# Patient Record
Sex: FEMALE | Race: WHITE | NOT HISPANIC OR LATINO | Employment: OTHER | URBAN - METROPOLITAN AREA
[De-identification: names, ages, dates, MRNs, and addresses within clinical notes are randomized per-mention and may not be internally consistent; named-entity substitution may affect disease eponyms.]

---

## 2018-09-17 LAB
CREAT ?TM UR-SCNC: 61.2 UMOL/L
HBA1C MFR BLD HPLC: 6.4 %
MICROALBUMIN UR-MCNC: 154.2 MG/L (ref 0–20)
MICROALBUMIN/CREAT UR: 252 MG/G{CREAT}

## 2018-09-19 ENCOUNTER — OFFICE VISIT (OUTPATIENT)
Dept: NEPHROLOGY | Facility: CLINIC | Age: 68
End: 2018-09-19
Payer: MEDICARE

## 2018-09-19 VITALS — BODY MASS INDEX: 28.67 KG/M2 | WEIGHT: 161.8 LBS | HEIGHT: 63 IN

## 2018-09-19 DIAGNOSIS — I12.9 HYPERTENSIVE CHRONIC KIDNEY DISEASE WITH STAGE 1 THROUGH STAGE 4 CHRONIC KIDNEY DISEASE, OR UNSPECIFIED CHRONIC KIDNEY DISEASE: ICD-10-CM

## 2018-09-19 DIAGNOSIS — N18.30 CHRONIC KIDNEY DISEASE, STAGE III (MODERATE) (HCC): Primary | ICD-10-CM

## 2018-09-19 DIAGNOSIS — R80.1 PERSISTENT PROTEINURIA: ICD-10-CM

## 2018-09-19 LAB
SL AMB  POCT GLUCOSE, UA: ABNORMAL
SL AMB LEUKOCYTE ESTERASE,UA: ABNORMAL
SL AMB POCT BILIRUBIN,UA: ABNORMAL
SL AMB POCT BLOOD,UA: ABNORMAL
SL AMB POCT CLARITY,UA: CLEAR
SL AMB POCT COLOR,UA: YELLOW
SL AMB POCT KETONES,UA: ABNORMAL
SL AMB POCT NITRITE,UA: ABNORMAL
SL AMB POCT PH,UA: 8
SL AMB POCT SPECIFIC GRAVITY,UA: 1.01
SL AMB POCT URINE PROTEIN: ABNORMAL
SL AMB POCT UROBILINOGEN: ABNORMAL

## 2018-09-19 PROCEDURE — 81002 URINALYSIS NONAUTO W/O SCOPE: CPT | Performed by: INTERNAL MEDICINE

## 2018-09-19 PROCEDURE — 99205 OFFICE O/P NEW HI 60 MIN: CPT | Performed by: INTERNAL MEDICINE

## 2018-09-19 RX ORDER — LEVOTHYROXINE SODIUM 175 UG/1
150 TABLET ORAL DAILY
COMMUNITY
End: 2021-04-01

## 2018-09-19 RX ORDER — DULOXETIN HYDROCHLORIDE 60 MG/1
90 CAPSULE, DELAYED RELEASE ORAL DAILY
COMMUNITY
End: 2022-04-05 | Stop reason: ALTCHOICE

## 2018-09-19 RX ORDER — GLIMEPIRIDE 2 MG/1
4 TABLET ORAL 2 TIMES DAILY
COMMUNITY
End: 2020-05-05 | Stop reason: HOSPADM

## 2018-09-19 RX ORDER — CYCLOBENZAPRINE HCL 10 MG
10 TABLET ORAL DAILY
COMMUNITY
End: 2020-05-11

## 2018-09-19 RX ORDER — TRIAMTERENE AND HYDROCHLOROTHIAZIDE 37.5; 25 MG/1; MG/1
1 CAPSULE ORAL EVERY MORNING
COMMUNITY
End: 2019-04-06 | Stop reason: ALTCHOICE

## 2018-09-19 RX ORDER — PRAMIPEXOLE DIHYDROCHLORIDE 0.25 MG/1
0.25 TABLET ORAL DAILY
COMMUNITY
End: 2020-11-24 | Stop reason: SDUPTHER

## 2018-09-19 RX ORDER — METOPROLOL TARTRATE 50 MG/1
50 TABLET, FILM COATED ORAL EVERY 12 HOURS SCHEDULED
COMMUNITY
End: 2020-10-26 | Stop reason: SDUPTHER

## 2018-09-19 RX ORDER — MULTIVITAMIN
1 CAPSULE ORAL DAILY
COMMUNITY
End: 2020-05-05 | Stop reason: HOSPADM

## 2018-09-19 RX ORDER — ROSUVASTATIN CALCIUM 5 MG/1
5 TABLET, COATED ORAL DAILY
COMMUNITY
End: 2019-11-13 | Stop reason: ALTCHOICE

## 2018-09-19 RX ORDER — LOSARTAN POTASSIUM 100 MG/1
50 TABLET ORAL DAILY
COMMUNITY
End: 2018-11-27 | Stop reason: CLARIF

## 2018-09-19 RX ORDER — FENOFIBRATE 145 MG/1
145 TABLET, COATED ORAL DAILY
COMMUNITY
End: 2019-11-13 | Stop reason: ALTCHOICE

## 2018-09-19 RX ORDER — AMLODIPINE BESYLATE 5 MG/1
5 TABLET ORAL DAILY
COMMUNITY
End: 2018-10-08 | Stop reason: SDUPTHER

## 2018-09-19 RX ORDER — FOLIC ACID 0.8 MG
1 TABLET ORAL 2 TIMES DAILY
COMMUNITY
End: 2021-04-12

## 2018-09-19 NOTE — PATIENT INSTRUCTIONS
1   No medication change today  2   Please go for lab work nonfasting at your convenience now  3  Please go for a kidney artery ultrasound now which we will help to arrange  4  Please purchase an Omron blood pressure machine:  Omron 10 series-785n through [employer]  Then please take 1 week a blood pressure readings morning and evening  Take the morning readings before taking any medications  Take the evening readings closer bedtime  Please then bring in your readings along with your blood pressure machine to see 1 of my advanced practitioner's in about 2-3 weeks  5   Follow-up in 4 months:  · Please bring in 1 week a blood pressure readings morning and evening, sitting and standing is outlined above  The standing blood pressures should be with the arm supported at heart level  · Please go for fasting lab work prior to your appointment  6  General instructions:  -avoid salt  -avoid medications such as Motrin, Naprosyn, ibuprofen, Aleve or Advil or Celebrex as they can affect your kidney function; you can use Tylenol as needed for pain or fevers if you have no liver problems  -avoid medications such as Sudafed or other medications with decongestants as they can raise your blood pressure  -try to exercise at least 30 minutes at least 3 days a week with an ultimate goal of 5 days a week  -try to lose 5-10 lb by your next visit

## 2018-09-19 NOTE — LETTER
September 19, 2018     Carley Buerger, [address] [place] [address] Radhames Acuña    Patient: Jere Gonsalves   YOB: 1950   Date of Visit: 9/19/2018       Dear Dr Ruth Gloria:    Thank you for referring Jere Gonsalves to me for evaluation  Below are my notes for this consultation  If you have questions, please do not hesitate to call me  I look forward to following your patient along with you  Sincerely,        Evelia Shelton MD        CC: DO Nohemi Cain MD Loyola Sa, MD Pearle Ring, MD  9/19/2018  3:32 PM  Sign at close encounter  Consultation - Nephrology 9/19/2018        History of Present Illness   Reason for Consult / Principal Problem:   CKD stage 3    HPI: Jere Gonsalves is a 70y o  year old female with a history of of hypertension/diabetes mellitus/BARBARA who we are asked to evaluate regarding CKD stage 3:  Creatinines:  -01/09/2018:   1 48  -07/03/2018:  2 0  -07/27/2018:  1 66  -09/17/2018:  1 89  Past urologic history:  -history of a kidney stone over 30 years ago initially attempted lithotripsy 15 years ago had to redo it and eventually was removed  None since then  -difficulty voiding, difficulty with emptying her bladder and is followed by Urology Dr Angelito Arthur with [address]  For now he is observing her in asking her to empty her bladder every 2-3 hours  No history of gross hematuria or foamy urine   -no NSAID use at this time; was on Celebrex for about 10 years for the arthritis stopped about 3 years ago  -no significant urinary tract infection history  History of hypertension for about 15 years she believes well controlled but she does not followed at home  History of diabetes mellitus for about 17 years treated medically  No overt retinopathy or neuropathy    In regards to swelling, when traveling a lot on her feet she has developed swelling but that essentially resolved until about 1 month ago when she noted some swelling of her left calf without any tenderness and it resolved spontaneously a few days later  And currently no swelling  She did have a venous duplex there was no clot at the time  Chronic joint pain secondary to osteoarthritis in particular of her hands feet and ankles this has been longstanding but slightly worse recently  No unusual skin rash  Blood pressure medications:  -triamterene/HCTZ 37 5-25 milligrams daily  -amlodipine 10 milligrams daily  -losartan 100 milligrams daily  -metoprolol tartrate 50 milligrams twice a day    General:  No fevers chills or recent illnesses or coughing  Appetite is good  Energy is poor but has been so for several years since extensive hiatal hernia surgery requiring ventilation  Cardiovascular:  No chest pain or shortness of Breath:  Please see above regarding swelling  Respiratory:  No wheezing or shortness of Breath  Gastrointestinal:  No nausea vomiting or diarrhea  She is constipated  She does take MiraLax  A remote history of hematochezia secondary to hemorrhoids/constipation  No current blood in the stools or dark black tarry stools    Genitourinary:  See HPI  Neurology:   Occasional headache, some slight dizziness/lightheadedness upon standing and if she closes her eyes and a hot shower for example; no overt paresthesias or numbness  Rest of review of systems as completely reviewed with the patient are negative  Past medical history:  · Anemia  · Depression  · Diabetes mellitus  · GERD  · Hiatal hernia  · Dyslipidemia  · Hypertension  · Hypothyroidism  · Osteopenia  · BARBARA:  On CPAP  · Fibromyalgia/osteoarthritis  · Nephrolithiasis  · Squamous cell cancer  of the face; 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  · Urinary stress incontinence    Past surgical history:  · Abdominoplasty  · Breast surgery reduction procedure bilaterally  · D&C  · Cholecystectomy  · Laparoscopic esophagogastric fundoplasty fundoplication for a hiatal hernia  · Knee arthroscopy  · Partial parathyroidectomy  · Septoplasty  · Tonsils and adenoidectomy  · Tubal ligation    Social History   History   Alcohol use Not on file   ·  rare alcohol use  History   Drug use: Unknown     History   Smoking Status    Not on file   Smokeless Tobacco    Not on file   ·  Smoked about 8 years, stopped in 1976    Family history:    Meds/Allergies   all current active meds have been reviewed, current meds:   Current Outpatient Prescriptions:     ALENDRONATE SODIUM PO, Take 1 tablet by mouth once a week, Disp: , Rfl:     amLODIPine (NORVASC) 10 mg tablet, Take 10 mg by mouth daily, Disp: , Rfl:     b complex vitamins tablet, Take 1 tablet by mouth 2 (two) times a day, Disp: , Rfl:     Calcium Carbonate (CALCIUM 600 PO), Take 1 capsule by mouth 2 (two) times a day, Disp: , Rfl:     cyclobenzaprine (FLEXERIL) 10 mg tablet, Take 10 mg by mouth daily, Disp: , Rfl:     diclofenac sodium (VOLTAREN) 1 %, Apply 2 g topically as needed, Disp: , Rfl:     DULoxetine (CYMBALTA) 60 mg delayed release capsule, Take 60 mg by mouth daily, Disp: , Rfl:     fenofibrate (TRICOR) 145 mg tablet, Take 145 mg by mouth daily, Disp: , Rfl:     glimepiride (AMARYL) 2 mg tablet, Take 2 mg by mouth 2 (two) times a day, Disp: , Rfl:     insulin detemir (LEVEMIR) 100 units/mL subcutaneous injection, Inject 25 Units under the skin daily at bedtime, Disp: , Rfl:     levothyroxine 175 mcg tablet, Take 175 mcg by mouth daily, Disp: , Rfl:     Linagliptin (TRADJENTA) 5 MG TABS, Take 5 mg by mouth daily, Disp: , Rfl:     losartan (COZAAR) 100 MG tablet, Take 100 mg by mouth daily, Disp: , Rfl:     Magnesium 500 MG CAPS, Take 1 capsule by mouth 3 (three) times a day, Disp: , Rfl:     metoprolol tartrate (LOPRESSOR) 50 mg tablet, Take 25 mg by mouth 2 (two) times a day, Disp: , Rfl:     Multiple Vitamin (MULTIVITAMIN) capsule, Take 1 capsule by mouth daily, Disp: , Rfl:     OMEPRAZOLE PO, Take 1 tablet by mouth daily, Disp: , Rfl:     pramipexole (MIRAPEX) 0 25 mg tablet, Take 0 25 mg by mouth daily, Disp: , Rfl:     rosuvastatin (CRESTOR) 5 mg tablet, Take 5 mg by mouth daily, Disp: , Rfl:     triamterene-hydrochlorothiazide (DYAZIDE) 37 5-25 mg per capsule, Take 1 capsule by mouth every morning, Disp: , Rfl:     Allergies   Allergen Reactions    Ciprofloxacin Hives       Objective   Vital signs:  BP sitting on left:  132/62 with a heart rate of 68 and regular  Same on the right  BP standing on left:  130/66 with a heart rate of 60 and regular    Body mass index is 28 66 kg/m²  General:  Well-developed well-nourished/obese in no acute distress  Skin:  No acute rash  Eyes:  No scleral icterus and noninjected  ENT:  Normocephalic/atraumatic, mucous membranes moist  Neck:  Supple, no jugular venous distention, 1+ carotid upstroke without carotid bruit, no thyromegaly  Back:  No CVA tenderness and no obvious spinal abnormality  Chest:  Clear to auscultation percussion, good respiratory effort  CVS:  Regular rate and rhythm without a murmur rub or gallops appreciable  Abdomen:  Obese, normal bowel sounds, soft and nontender without hepatosplenomegaly or bruits, no masses appreciable  Extremities:  No clubbing, no cyanosis, no edema, 1+ dorsalis pedis pulses without femoral bruits, no calf tenderness  Neuro:  No gross focality  Psych:  Alert oriented and appropriate    Current Weight:   Weight (last 2 days)     Date/Time   Weight    09/19/18 1446  73 4 (161 8)                Lab Results:  I have personally reviewed pertinent labs    Results for orders placed or performed in visit on 09/19/18   POCT urine dip   Result Value Ref Range    LEUKOCYTE ESTERASE,UA neg     NITRITE,UA neg     SL AMB POCT UROBILINOGEN Neg     SL AMB POCT URINE PROTEIN trace      PH,UA 8 0      BLOOD,UA Neg     SPECIFIC GRAVITY,UA 1 010     KETONES,UA neg      BILIRUBIN,UA neg     GLUCOSE, UA neg      COLOR,UA yellow      CLARITY,UA clear     UA microscopic by my exam:  As of 09/19/2018:  Few WBCs per high-power field, no RBCs and no casts    Recent labs as of 09/17/2018:  Sodium 138  Potassium 4 8  Creatinine 1 89   urine microalbumin creatinine ratio:  252 milligrams/gram  Normal liver function studies  Calcium 10 1  Total protein 6 9  Albumin 4 6  Total cholesterol 131/triglycerides 366/HDL 31/LDL 27  Vitamin-D 35 4  Hemoglobin A1c 6 4  Hemoglobin of 12 0    Renal ultrasound by report:  06/19/2018:  -Left upper kidney shows nonobstructing calculus approximately 8 millimeters, otherwise unremarkable ultrasound  -right kidney 11 5  -left kidney 12 2    KUB as of 05/18/2018:  -no abnormal mineralization to suggest renal calculus    CT scan as of 06/28/2018 of the abdomen and pelvis:  -nonobstructing 9 mm upper pole left renal calculus  -16 mm indeterminate hypoechoic structure in the anterior left kidney for which further correlation with renal ultrasound is recommended  Please see above   -small to moderate-sized hiatal hernia  -evidence of gastric surgery and cholecystectomy  -ventral abdominal wall hernias containing fat  -constipation  -sewn parenchymal opacity in the left lower lobe of the lung which may be a manifestation of subsegmental atelectasis or pneumonitis otherwise lung bases are clear        ASSESSMENT AND PLAN:  1   CKD stage 3:  Baseline creatinine appears to be from 1 5-2  Most recent creatinine 1 89 milligrams/deciliter  Differential diagnosis would include the following:  · Diabetic nephropathy  · Hypertensive nephrosclerosis  · Arteriolar nephrosclerosis  · Chronic NSAID use in the past  · No evidence of obstructive uropathy based on ultrasound  · ? Renal artery disease  · Less likely primary glomerular disease  Recommendations:   Workup:  · CPK to make sure no evidence of rhabdomyolysis unlikely  · Urine protein creatinine ratio  · Further workup with serologies if greater than 1 gram of proteinuria to rule out other systemic illnesses that may be contributing to the renal disease  · Complete mineral bone disorder evaluation with PTH intact level and phosphorus level  Treatment:  · Treat hypertension:  Please see below  · Treat diabetes mellitus per your discretion  · Treat dyslipidemia with a goal LDL less than 100  · Treat any underlying mineral bone disorders  · Avoidance of salt, pursue weight loss and isotonic exercise  Patient counseled as such  · Avoid any nephrotoxic medications such as NSAIDs, patient counseled as such  · Will follow labs on a regular basis  2  Hypertension:  Most likely related to CKD/diabetic nephropathy/thyroid disease/BARBARA  However given the fact that it is resistant on 4 medications fairly high doses I would do a secondary workup including the following:  Workup:  · Aldosterone/renin ratio  · Urine protein creatinine ratio  · Renal artery duplex  · Plasma free metanephrines  Treatment:  · Continue current medical regimen  · Purchase a Omron blood pressure machine and monitor blood pressures at home  · Push low-salt diet, isotonic exercise and weight losing diet  · GOAL BLOOD PRESSURE LESS THAN 135/85 FROM POSSIBLY LOWER SIGNIFICANT PROTEINURIA      Patient Instructions   1  No medication change today  2   Please go for lab work nonfasting at your convenience now  3  Please go for a kidney artery ultrasound now which we will help to arrange  4  Please purchase an Omron blood pressure machine:  Omron 10 series-785n through [employer]  Then please take 1 week a blood pressure readings morning and evening  Take the morning readings before taking any medications  Take the evening readings closer bedtime  Please then bring in your readings along with your blood pressure machine to see 1 of my advanced practitioner's in about 2-3 weeks  5   Follow-up in 4 months:  · Please bring in 1 week a blood pressure readings morning and evening, sitting and standing is outlined above    The standing blood pressures should be with the arm supported at heart level  · Please go for fasting lab work prior to your appointment  6  General instructions:  -avoid salt  -avoid medications such as Motrin, Naprosyn, ibuprofen, Aleve or Advil or Celebrex as they can affect your kidney function; you can use Tylenol as needed for pain or fevers if you have no liver problems  -avoid medications such as Sudafed or other medications with decongestants as they can raise your blood pressure  -try to exercise at least 30 minutes at least 3 days a week with an ultimate goal of 5 days a week  -try to lose 5-10 lb by your next visit        Portions of the record may have been created with voice recognition software   Occasional wrong word or "sound a like" substitutions may have occurred due to the inherent limitations of voice recognition software   Read the chart carefully and recognize, using context, where substitutions have occurred      Juice Best MD

## 2018-09-19 NOTE — PROGRESS NOTES
Consultation - Nephrology 9/19/2018        History of Present Illness   Reason for Consult / Principal Problem:   CKD stage 3    HPI: Willis Farah is a 70y o  year old female with a history of of hypertension/diabetes mellitus/BARBARA who we are asked to evaluate regarding CKD stage 3:  Creatinines:  -01/09/2018:   1 48  -07/03/2018:  2 0  -07/27/2018:  1 66  -09/17/2018:  1 89  Past urologic history:  -history of a kidney stone over 30 years ago initially attempted lithotripsy 15 years ago had to redo it and eventually was removed  None since then  -difficulty voiding, difficulty with emptying her bladder and is followed by Urology Dr Sathish Werner with [address]  For now he is observing her in asking her to empty her bladder every 2-3 hours  No history of gross hematuria or foamy urine   -no NSAID use at this time; was on Celebrex for about 10 years for the arthritis stopped about 3 years ago  -no significant urinary tract infection history  History of hypertension for about 15 years she believes well controlled but she does not followed at home  History of diabetes mellitus for about 17 years treated medically  No overt retinopathy or neuropathy  In regards to swelling, when traveling a lot on her feet she has developed swelling but that essentially resolved until about 1 month ago when she noted some swelling of her left calf without any tenderness and it resolved spontaneously a few days later  And currently no swelling  She did have a venous duplex there was no clot at the time  Chronic joint pain secondary to osteoarthritis in particular of her hands feet and ankles this has been longstanding but slightly worse recently  No unusual skin rash  Blood pressure medications:  -triamterene/HCTZ 37 5-25 milligrams daily  -amlodipine 10 milligrams daily  -losartan 100 milligrams daily  -metoprolol tartrate 50 milligrams twice a day    General:  No fevers chills or recent illnesses or coughing    Appetite is good   Energy is poor but has been so for several years since extensive hiatal hernia surgery requiring ventilation  Cardiovascular:  No chest pain or shortness of Breath:  Please see above regarding swelling  Respiratory:  No wheezing or shortness of Breath  Gastrointestinal:  No nausea vomiting or diarrhea  She is constipated  She does take MiraLax  A remote history of hematochezia secondary to hemorrhoids/constipation  No current blood in the stools or dark black tarry stools    Genitourinary:  See HPI  Neurology:   Occasional headache, some slight dizziness/lightheadedness upon standing and if she closes her eyes and a hot shower for example; no overt paresthesias or numbness  Rest of review of systems as completely reviewed with the patient are negative  Past medical history:  · Anemia  · Depression  · Diabetes mellitus  · GERD  · Hiatal hernia  · Dyslipidemia  · Hypertension  · Hypothyroidism  · Osteopenia  · BARBARA:  On CPAP  · Fibromyalgia/osteoarthritis  · Nephrolithiasis  · Squamous cell cancer  of the face; 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  · Urinary stress incontinence    Past surgical history:  · Abdominoplasty  · Breast surgery reduction procedure bilaterally  · D&C  · Cholecystectomy  · Laparoscopic esophagogastric fundoplasty fundoplication for a hiatal hernia  · Knee arthroscopy  · Partial parathyroidectomy  · Septoplasty  · Tonsils and adenoidectomy  · Tubal ligation    Social History   History   Alcohol use Not on file   ·  rare alcohol use  History   Drug use: Unknown     History   Smoking Status    Not on file   Smokeless Tobacco    Not on file   ·  Smoked about 8 years, stopped in 1976    Family history:    Meds/Allergies   all current active meds have been reviewed, current meds:   Current Outpatient Prescriptions:     ALENDRONATE SODIUM PO, Take 1 tablet by mouth once a week, Disp: , Rfl:     amLODIPine (NORVASC) 10 mg tablet, Take 10 mg by mouth daily, Disp: , Rfl:     b complex vitamins tablet, Take 1 tablet by mouth 2 (two) times a day, Disp: , Rfl:     Calcium Carbonate (CALCIUM 600 PO), Take 1 capsule by mouth 2 (two) times a day, Disp: , Rfl:     cyclobenzaprine (FLEXERIL) 10 mg tablet, Take 10 mg by mouth daily, Disp: , Rfl:     diclofenac sodium (VOLTAREN) 1 %, Apply 2 g topically as needed, Disp: , Rfl:     DULoxetine (CYMBALTA) 60 mg delayed release capsule, Take 60 mg by mouth daily, Disp: , Rfl:     fenofibrate (TRICOR) 145 mg tablet, Take 145 mg by mouth daily, Disp: , Rfl:     glimepiride (AMARYL) 2 mg tablet, Take 2 mg by mouth 2 (two) times a day, Disp: , Rfl:     insulin detemir (LEVEMIR) 100 units/mL subcutaneous injection, Inject 25 Units under the skin daily at bedtime, Disp: , Rfl:     levothyroxine 175 mcg tablet, Take 175 mcg by mouth daily, Disp: , Rfl:     Linagliptin (TRADJENTA) 5 MG TABS, Take 5 mg by mouth daily, Disp: , Rfl:     losartan (COZAAR) 100 MG tablet, Take 100 mg by mouth daily, Disp: , Rfl:     Magnesium 500 MG CAPS, Take 1 capsule by mouth 3 (three) times a day, Disp: , Rfl:     metoprolol tartrate (LOPRESSOR) 50 mg tablet, Take 25 mg by mouth 2 (two) times a day, Disp: , Rfl:     Multiple Vitamin (MULTIVITAMIN) capsule, Take 1 capsule by mouth daily, Disp: , Rfl:     OMEPRAZOLE PO, Take 1 tablet by mouth daily, Disp: , Rfl:     pramipexole (MIRAPEX) 0 25 mg tablet, Take 0 25 mg by mouth daily, Disp: , Rfl:     rosuvastatin (CRESTOR) 5 mg tablet, Take 5 mg by mouth daily, Disp: , Rfl:     triamterene-hydrochlorothiazide (DYAZIDE) 37 5-25 mg per capsule, Take 1 capsule by mouth every morning, Disp: , Rfl:     Allergies   Allergen Reactions    Ciprofloxacin Hives       Objective   Vital signs:  BP sitting on left:  132/62 with a heart rate of 68 and regular  Same on the right  BP standing on left:  130/66 with a heart rate of 60 and regular    Body mass index is 28 66 kg/m²      General:  Well-developed well-nourished/obese in no acute distress  Skin:  No acute rash  Eyes:  No scleral icterus and noninjected  ENT:  Normocephalic/atraumatic, mucous membranes moist  Neck:  Supple, no jugular venous distention, 1+ carotid upstroke without carotid bruit, no thyromegaly  Back:  No CVA tenderness and no obvious spinal abnormality  Chest:  Clear to auscultation percussion, good respiratory effort  CVS:  Regular rate and rhythm without a murmur rub or gallops appreciable  Abdomen:  Obese, normal bowel sounds, soft and nontender without hepatosplenomegaly or bruits, no masses appreciable  Extremities:  No clubbing, no cyanosis, no edema, 1+ dorsalis pedis pulses without femoral bruits, no calf tenderness  Neuro:  No gross focality  Psych:  Alert oriented and appropriate    Current Weight:   Weight (last 2 days)     Date/Time   Weight    09/19/18 1446  73 4 (161 8)                Lab Results:  I have personally reviewed pertinent labs    Results for orders placed or performed in visit on 09/19/18   POCT urine dip   Result Value Ref Range    LEUKOCYTE ESTERASE,UA neg     NITRITE,UA neg     SL AMB POCT UROBILINOGEN Neg     SL AMB POCT URINE PROTEIN trace      PH,UA 8 0      BLOOD,UA Neg     SPECIFIC GRAVITY,UA 1 010     KETONES,UA neg      BILIRUBIN,UA neg     GLUCOSE, UA neg      COLOR,UA yellow      CLARITY,UA clear     UA microscopic by my exam:  As of 09/19/2018:  Few WBCs per high-power field, no RBCs and no casts    Recent labs as of 09/17/2018:  Sodium 138  Potassium 4 8  Creatinine 1 89   urine microalbumin creatinine ratio:  252 milligrams/gram  Normal liver function studies  Calcium 10 1  Total protein 6 9  Albumin 4 6  Total cholesterol 131/triglycerides 366/HDL 31/LDL 27  Vitamin-D 35 4  Hemoglobin A1c 6 4  Hemoglobin of 12 0    Renal ultrasound by report:  06/19/2018:  -Left upper kidney shows nonobstructing calculus approximately 8 millimeters, otherwise unremarkable ultrasound  -right kidney 11 5  -left kidney 12 2    KUB as of 05/18/2018:  -no abnormal mineralization to suggest renal calculus    CT scan as of 06/28/2018 of the abdomen and pelvis:  -nonobstructing 9 mm upper pole left renal calculus  -16 mm indeterminate hypoechoic structure in the anterior left kidney for which further correlation with renal ultrasound is recommended  Please see above   -small to moderate-sized hiatal hernia  -evidence of gastric surgery and cholecystectomy  -ventral abdominal wall hernias containing fat  -constipation  -sewn parenchymal opacity in the left lower lobe of the lung which may be a manifestation of subsegmental atelectasis or pneumonitis otherwise lung bases are clear        ASSESSMENT AND PLAN:  1   CKD stage 3:  Baseline creatinine appears to be from 1 5-2  Most recent creatinine 1 89 milligrams/deciliter  Differential diagnosis would include the following:  · Diabetic nephropathy  · Hypertensive nephrosclerosis  · Arteriolar nephrosclerosis  · Chronic NSAID use in the past  · No evidence of obstructive uropathy based on ultrasound  · ? Renal artery disease  · Less likely primary glomerular disease  Recommendations: Workup:  · CPK to make sure no evidence of rhabdomyolysis unlikely  · Urine protein creatinine ratio  · Further workup with serologies if greater than 1 gram of proteinuria to rule out other systemic illnesses that may be contributing to the renal disease  · Complete mineral bone disorder evaluation with PTH intact level and phosphorus level  Treatment:  · Treat hypertension:  Please see below  · Treat diabetes mellitus per your discretion  · Treat dyslipidemia with a goal LDL less than 100  · Treat any underlying mineral bone disorders  · Avoidance of salt, pursue weight loss and isotonic exercise  Patient counseled as such  · Avoid any nephrotoxic medications such as NSAIDs, patient counseled as such  · Will follow labs on a regular basis  2  Hypertension:  Most likely related to CKD/diabetic nephropathy/thyroid disease/BARBARA    However given the fact that it is resistant on 4 medications fairly high doses I would do a secondary workup including the following:  Workup:  · Aldosterone/renin ratio  · Urine protein creatinine ratio  · Renal artery duplex  · Plasma free metanephrines  Treatment:  · Continue current medical regimen  · Purchase a Omron blood pressure machine and monitor blood pressures at home  · Push low-salt diet, isotonic exercise and weight losing diet  · GOAL BLOOD PRESSURE LESS THAN 135/85 FROM POSSIBLY LOWER SIGNIFICANT PROTEINURIA      Patient Instructions   1  No medication change today  2   Please go for lab work nonfasting at your convenience now  3  Please go for a kidney artery ultrasound now which we will help to arrange  4  Please purchase an Omron blood pressure machine:  Omron 10 [id number] through [employer]  Then please take 1 week a blood pressure readings morning and evening  Take the morning readings before taking any medications  Take the evening readings closer bedtime  Please then bring in your readings along with your blood pressure machine to see 1 of my advanced practitioner's in about 2-3 weeks  5   Follow-up in 4 months:  · Please bring in 1 week a blood pressure readings morning and evening, sitting and standing is outlined above  The standing blood pressures should be with the arm supported at heart level  · Please go for fasting lab work prior to your appointment  6  General instructions:  -avoid salt  -avoid medications such as Motrin, Naprosyn, ibuprofen, Aleve or Advil or Celebrex as they can affect your kidney function; you can use Tylenol as needed for pain or fevers if you have no liver problems  -avoid medications such as Sudafed or other medications with decongestants as they can raise your blood pressure  -try to exercise at least 30 minutes at least 3 days a week with an ultimate goal of 5 days a week  -try to lose 5-10 lb by your next visit     I Spent more than 50 percent (45 minutes counseling/45 minutes visit time) of my visit time in counseling the patient and her  regarding chronic kidney disease potential prognosis and progression, as well the evaluation, as well as methods and ways to improve not only the kidneys but blood pressure etc   Answered all questions  Portions of the record may have been created with voice recognition software   Occasional wrong word or "sound a like" substitutions may have occurred due to the inherent limitations of voice recognition software   Read the chart carefully and recognize, using context, where substitutions have occurred      Kaushik Chapman MD

## 2018-09-26 LAB
ALDOST SERPL-MCNC: 20.7 NG/DL (ref 0–30)
CK SERPL-CCNC: 105 U/L (ref 24–173)
CREAT UR-MCNC: 18.6 MG/DL
METANEPH FREE SERPL-MCNC: <10 PG/ML (ref 0–62)
NORMETANEPHRINE SERPL-MCNC: 35 PG/ML (ref 0–145)
PROT UR-MCNC: 9.9 MG/DL
PROT/CREAT UR: 532 MG/G CREAT (ref 0–200)
PTH-INTACT SERPL-MCNC: 22 PG/ML (ref 15–65)
RENIN PLAS-CCNC: 0.42 NG/ML/HR (ref 0.17–5.38)

## 2018-09-27 ENCOUNTER — DOCUMENTATION (OUTPATIENT)
Dept: NEPHROLOGY | Facility: CLINIC | Age: 68
End: 2018-09-27

## 2018-09-27 ENCOUNTER — TELEPHONE (OUTPATIENT)
Dept: NEPHROLOGY | Facility: CLINIC | Age: 68
End: 2018-09-27

## 2018-09-27 NOTE — TELEPHONE ENCOUNTER
Patient is scheduled for her saline suppression test on 10/11/18 at 0800  Patient notified of appointment

## 2018-09-27 NOTE — TELEPHONE ENCOUNTER
Called and spoke to patient regarding the saline suppression test Dr Ana Donaldson ordered  Patient is willing to do test and is available after 10/8/18 at the [address]  I will call patient back with appointment date and time  While on the phone patient reported feeling very lightheaded when she stands up and feels weak in legs  Last three BP readings as follows: 89/50; 93/58, 99/62  These were taken in the evening  I reviewed her medication list with her and found she had been taking 200 mg of lopressor daily, 10 mg amlodipine daily, 100 mg of losartan daily, and 37 25-25mg dyazide daily  Patients lopressor in the chart states 25 mg twice daily, the OV note states 50 mg twice daily, however the patients pill bottle is 100 mg tablets and she had been taking those twice daily  I spoke to Dr Ana Donaldson and he made the following adjustments:  Lopressor:  50 mg twice daily by mouth  Amlodipine:  5 mg once daily by mouth  Losartan: 50 mg once daily by mouth  Dyazide:  No change    Patient was notified and also encouraged to continue to record blood pressures  She is already scheduled to see Edilberto Morris on 10/8/18  Patient was instructed to bring in blood pressure readings with her to that appointment

## 2018-10-08 ENCOUNTER — OFFICE VISIT (OUTPATIENT)
Dept: NEPHROLOGY | Facility: CLINIC | Age: 68
End: 2018-10-08
Payer: MEDICARE

## 2018-10-08 VITALS
WEIGHT: 157 LBS | SYSTOLIC BLOOD PRESSURE: 128 MMHG | HEIGHT: 63 IN | BODY MASS INDEX: 27.82 KG/M2 | DIASTOLIC BLOOD PRESSURE: 75 MMHG | HEART RATE: 64 BPM

## 2018-10-08 DIAGNOSIS — I12.9 HYPERTENSIVE CHRONIC KIDNEY DISEASE WITH STAGE 1 THROUGH STAGE 4 CHRONIC KIDNEY DISEASE, OR UNSPECIFIED CHRONIC KIDNEY DISEASE: ICD-10-CM

## 2018-10-08 DIAGNOSIS — N18.30 CHRONIC KIDNEY DISEASE, STAGE III (MODERATE) (HCC): Primary | ICD-10-CM

## 2018-10-08 PROCEDURE — 99213 OFFICE O/P EST LOW 20 MIN: CPT | Performed by: PHYSICIAN ASSISTANT

## 2018-10-08 RX ORDER — AMLODIPINE BESYLATE 2.5 MG/1
2.5 TABLET ORAL DAILY
Qty: 30 TABLET | Refills: 2 | Status: SHIPPED | OUTPATIENT
Start: 2018-10-08 | End: 2019-06-27

## 2018-10-08 NOTE — PATIENT INSTRUCTIONS
Hypertension- Goal BP is <135/85  Antihypertensive regimen includes amlodipine 5mg daily, losartan 50mg daily, metoprolol 50mg twice a day, and dyazide 37 5/25mg daily  Avoid salt in your diet  Exercise and stay active  Avoid NSAIDs such as aleve, motrin, ibuprofen, advil, Excedrin, naproxen, mobic, and celebrex  Secondary workup: catecholamines normal, renin 0 424, aldosterone 20 7, renal artery Doppler pending  Saline suppression test scheduled for 10/11 at 8am     Home blood pressure cuff correlation: + 6 to 7 / +3 to 7  Decrease amlodipine to 2 5mg daily (1/2 tablet)  Chronic Kidney Disease stage III- Baseline creatinine is 1 5-2  Proteinuria <1g  Follow up with Dr Jason Darnell in January  Please call the office with any questions or concerns

## 2018-10-08 NOTE — PROGRESS NOTES
Assessment and Plan:    Diagnoses and all orders for this visit:    Chronic kidney disease, stage III (moderate) (HCC)    Hypertensive chronic kidney disease with stage 1 through stage 4 chronic kidney disease, or unspecified chronic kidney disease  -     amLODIPine (NORVASC) 2 5 mg tablet; Take 1 tablet (2 5 mg total) by mouth daily       Hypertension- Goal BP is <135/85  Antihypertensive regimen includes amlodipine 5mg daily, losartan 50mg daily, metoprolol 50mg twice a day, and dyazide 37 5/25mg daily  Avoid salt in your diet  Exercise and stay active  Avoid NSAIDs such as aleve, motrin, ibuprofen, advil, Excedrin, naproxen, mobic, and celebrex  Secondary workup: catecholamines normal, renin 0 424, aldosterone 20 7, renal artery Doppler pending  Saline suppression test scheduled for 10/11 at 8am     Home blood pressure cuff correlation: + 6 to 7 / +3 to 7  Decrease amlodipine to 2 5mg daily (1/2 tablet)  Chronic Kidney Disease stage III- Baseline creatinine is 1 5-2  Proteinuria <1g  Follow up with Dr Darryle Ishihara in January  Please call the office with any questions or concerns  Reason for Visit: No chief complaint on file  HPI: Aaron Godinez is a 70 y o  female who is here for follow up of hypertension  She first saw Dr Darryle Ishihara in September for a consultation for CKD  She was feeling lightheaded and dizzy and still gets lightheaded and dizzy when she is busy or going up steps  She has no other complaints  She recently just moved into a new house  She has a saline suppression test on 10/11/18  All questions answered  ROS: A complete review of systems was performed and was negative unless otherwise noted in the history of present illness      Allergies:   Ciprofloxacin    Medications:     Current Outpatient Prescriptions:     ALENDRONATE SODIUM PO, Take 1 tablet by mouth once a week, Disp: , Rfl:     amLODIPine (NORVASC) 2 5 mg tablet, Take 1 tablet (2 5 mg total) by mouth daily, Disp: 30 tablet, Rfl: 2    b complex vitamins tablet, Take 1 tablet by mouth 2 (two) times a day, Disp: , Rfl:     Calcium Carbonate (CALCIUM 600 PO), Take 1 capsule by mouth 2 (two) times a day, Disp: , Rfl:     cyclobenzaprine (FLEXERIL) 10 mg tablet, Take 10 mg by mouth daily, Disp: , Rfl:     diclofenac sodium (VOLTAREN) 1 %, Apply 2 g topically as needed, Disp: , Rfl:     DULoxetine (CYMBALTA) 60 mg delayed release capsule, Take 60 mg by mouth daily, Disp: , Rfl:     fenofibrate (TRICOR) 145 mg tablet, Take 145 mg by mouth daily, Disp: , Rfl:     glimepiride (AMARYL) 2 mg tablet, Take 2 mg by mouth 2 (two) times a day, Disp: , Rfl:     insulin detemir (LEVEMIR) 100 units/mL subcutaneous injection, Inject 25 Units under the skin daily at bedtime, Disp: , Rfl:     levothyroxine 175 mcg tablet, Take 175 mcg by mouth daily, Disp: , Rfl:     Linagliptin (TRADJENTA) 5 MG TABS, Take 5 mg by mouth daily, Disp: , Rfl:     losartan (COZAAR) 100 MG tablet, Take 50 mg by mouth daily  , Disp: , Rfl:     Magnesium 500 MG CAPS, Take 1 capsule by mouth 3 (three) times a day, Disp: , Rfl:     metoprolol tartrate (LOPRESSOR) 50 mg tablet, Take 50 mg by mouth every 12 (twelve) hours  , Disp: , Rfl:     Multiple Vitamin (MULTIVITAMIN) capsule, Take 1 capsule by mouth daily, Disp: , Rfl:     OMEPRAZOLE PO, Take 1 tablet by mouth daily, Disp: , Rfl:     pramipexole (MIRAPEX) 0 25 mg tablet, Take 0 25 mg by mouth daily, Disp: , Rfl:     rosuvastatin (CRESTOR) 5 mg tablet, Take 5 mg by mouth daily, Disp: , Rfl:     triamterene-hydrochlorothiazide (DYAZIDE) 37 5-25 mg per capsule, Take 1 capsule by mouth every morning, Disp: , Rfl:     Past Medical History:   Diagnosis Date    Anxiety     Arthritis     Diabetes mellitus [facility] [address] [facility]     Family history of thyroid problem     Heart burn     Hyperplasia, parathyroid ([facility] [address] [facility]     Hypertension     Kidney problem     Seasonal allergies     Sleep apnea     Swollen ankles      Past Surgical History:   Procedure Laterality Date    ARTHROSCOPY KNEE      BREAST BIOPSY      HIATAL HERNIA REPAIR      LIPOSUCTION      REDUCTION MAMMAPLASTY      SQUAMOUS CELL CARCINOMA EXCISION      TONSILLECTOMY      TUBAL LIGATION       Family History   Problem Relation Age of Onset    Heart disease Mother     Heart disease Father    [employer] Father         lung cancer      reports that she has quit smoking  She has never used smokeless tobacco  She reports that she drinks alcohol  She reports that she does not use drugs  Physical Exam:   Vitals:    10/08/18 0907 10/08/18 0908 10/08/18 0909 10/08/18 0910   BP: 110/70 117/77 122/72 128/75   BP Location: Right arm Left arm Right arm Right arm   Patient Position: Sitting Sitting Sitting Sitting   Cuff Size: Standard Standard Standard Standard   Pulse: 68 64     Weight:       Height:         Body mass index is 27 81 kg/m²  General: NAD  Neuro: AAO  Neck: supple  Skin: no rash  Heart: RRR  Lungs: CTAB  Abdomen: soft nt nd  Extremities: no edema    Procedure:  No results found for this or any previous visit  Labs reviewed      No results found for: GLUCOSE, CALCIUM, NA, K, CO2, CL, BUN, CREATININE

## 2018-10-08 NOTE — LETTER
October 8, 2018     Susanne Navarro, [address] [place] [address] Radhames Acuña    Patient: Aaron Godinez   YOB: 1950   Date of Visit: 10/8/2018       Dear Dr Carri Antunez:    Thank you for referring Aaron Godinez to me for evaluation  Below are my notes for this consultation  If you have questions, please do not hesitate to call me  I look forward to following your patient along with you  [facility],        [facility]        CC: No Recipients  [facility]  10/8/2018  9:22 AM  Sign at close encounter  Assessment and Plan:    Diagnoses and all orders for this visit:    Chronic kidney disease, stage III (moderate) ([address] )    Hypertensive chronic kidney disease with stage 1 through stage 4 chronic kidney disease, or unspecified chronic kidney disease  -     amLODIPine (NORVASC) 2 5 mg tablet; Take 1 tablet (2 5 mg total) by mouth daily       Hypertension- Goal BP is <135/85  Antihypertensive regimen includes amlodipine 5mg daily, losartan 50mg daily, metoprolol 50mg twice a day, and dyazide 37 5/25mg daily  Avoid salt in your diet  Exercise and stay active  Avoid NSAIDs such as aleve, motrin, ibuprofen, advil, Excedrin, naproxen, mobic, and celebrex  Secondary workup: catecholamines normal, renin 0 424, aldosterone 20 7, renal artery Doppler pending  Saline suppression test scheduled for 10/11 at 8am     Home blood pressure cuff correlation: + 6 to 7 / +3 to 7  Decrease amlodipine to 2 5mg daily (1/2 tablet)  Chronic Kidney Disease stage III- Baseline creatinine is 1 5-2  Proteinuria <1g  Follow up with Dr Darryle Ishihara in January  Please call the office with any questions or concerns  Reason for Visit: No chief complaint on file  HPI: Aaron Godinez is a 70 y o  female who is here for follow up of hypertension  She first saw Dr Darryle Ishihara in September for a consultation for CKD    She was feeling lightheaded and dizzy and still gets lightheaded and dizzy when she is busy or going up steps   She has no other complaints  She recently just moved into a new house  She has a saline suppression test on 10/11/18  All questions answered  ROS: A complete review of systems was performed and was negative unless otherwise noted in the history of present illness      Allergies:   Ciprofloxacin    Medications:     Current Outpatient Prescriptions:     ALENDRONATE SODIUM PO, Take 1 tablet by mouth once a week, Disp: , Rfl:     amLODIPine (NORVASC) 2 5 mg tablet, Take 1 tablet (2 5 mg total) by mouth daily, Disp: 30 tablet, Rfl: 2    b complex vitamins tablet, Take 1 tablet by mouth 2 (two) times a day, Disp: , Rfl:     Calcium Carbonate (CALCIUM 600 PO), Take 1 capsule by mouth 2 (two) times a day, Disp: , Rfl:     cyclobenzaprine (FLEXERIL) 10 mg tablet, Take 10 mg by mouth daily, Disp: , Rfl:     diclofenac sodium (VOLTAREN) 1 %, Apply 2 g topically as needed, Disp: , Rfl:     DULoxetine (CYMBALTA) 60 mg delayed release capsule, Take 60 mg by mouth daily, Disp: , Rfl:     fenofibrate (TRICOR) 145 mg tablet, Take 145 mg by mouth daily, Disp: , Rfl:     glimepiride (AMARYL) 2 mg tablet, Take 2 mg by mouth 2 (two) times a day, Disp: , Rfl:     insulin detemir (LEVEMIR) 100 units/mL subcutaneous injection, Inject 25 Units under the skin daily at bedtime, Disp: , Rfl:     levothyroxine 175 mcg tablet, Take 175 mcg by mouth daily, Disp: , Rfl:     Linagliptin (TRADJENTA) 5 MG TABS, Take 5 mg by mouth daily, Disp: , Rfl:     losartan (COZAAR) 100 MG tablet, Take 50 mg by mouth daily  , Disp: , Rfl:     Magnesium 500 MG CAPS, Take 1 capsule by mouth 3 (three) times a day, Disp: , Rfl:     metoprolol tartrate (LOPRESSOR) 50 mg tablet, Take 50 mg by mouth every 12 (twelve) hours  , Disp: , Rfl:     Multiple Vitamin (MULTIVITAMIN) capsule, Take 1 capsule by mouth daily, Disp: , Rfl:     OMEPRAZOLE PO, Take 1 tablet by mouth daily, Disp: , Rfl:     pramipexole (MIRAPEX) 0 25 mg tablet, Take 0 25 mg by mouth daily, Disp: , Rfl:     rosuvastatin (CRESTOR) 5 mg tablet, Take 5 mg by mouth daily, Disp: , Rfl:     triamterene-hydrochlorothiazide (DYAZIDE) 37 5-25 mg per capsule, Take 1 capsule by mouth every morning, Disp: , Rfl:     Past Medical History:   Diagnosis Date    Anxiety     Arthritis     Diabetes mellitus [facility] )     Family history of thyroid problem     Heart burn     Hyperplasia, parathyroid ([facility] )     Hypertension     Kidney problem     Seasonal allergies     Sleep apnea     Swollen ankles      Past Surgical History:   Procedure Laterality Date    ARTHROSCOPY KNEE      BREAST BIOPSY      HIATAL HERNIA REPAIR      LIPOSUCTION      REDUCTION MAMMAPLASTY      SQUAMOUS CELL CARCINOMA EXCISION      TONSILLECTOMY      TUBAL LIGATION       Family History   Problem Relation Age of Onset    Heart disease Mother     Heart disease Father     Cancer Father         lung cancer      reports that she has quit smoking  She has never used smokeless tobacco  She reports that she drinks alcohol  She reports that she does not use drugs  Physical Exam:   Vitals:    10/08/18 0907 10/08/18 0908 10/08/18 0909 10/08/18 0910   BP: 110/70 117/77 122/72 128/75   BP Location: Right arm Left arm Right arm Right arm   Patient Position: Sitting Sitting Sitting Sitting   Cuff Size: Standard Standard Standard Standard   Pulse: 68 64     Weight:       Height:         Body mass index is 27 81 kg/m²  General: NAD  Neuro: AAO  Neck: supple  Skin: no rash  Heart: RRR  Lungs: CTAB  Abdomen: soft nt nd  Extremities: no edema    Procedure:  No results found for this or any previous visit  Labs reviewed      No results found for: GLUCOSE, CALCIUM, NA, K, CO2, CL, BUN, CREATININE

## 2018-10-11 ENCOUNTER — HOSPITAL ENCOUNTER (OUTPATIENT)
Dept: INFUSION CENTER | Facility: CLINIC | Age: 68
Discharge: HOME | End: 2018-10-11
Payer: MEDICARE

## 2018-10-11 VITALS
TEMPERATURE: 98.5 F | OXYGEN SATURATION: 98 % | HEART RATE: 64 BPM | HEIGHT: 63 IN | WEIGHT: 158 LBS | DIASTOLIC BLOOD PRESSURE: 70 MMHG | RESPIRATION RATE: 16 BRPM | SYSTOLIC BLOOD PRESSURE: 132 MMHG | BODY MASS INDEX: 28 KG/M2

## 2018-10-11 PROCEDURE — 82088 ASSAY OF ALDOSTERONE: CPT | Performed by: INTERNAL MEDICINE

## 2018-10-11 PROCEDURE — 84244 ASSAY OF RENIN: CPT | Performed by: INTERNAL MEDICINE

## 2018-10-11 RX ADMIN — SODIUM CHLORIDE 1000 ML: 0.9 INJECTION, SOLUTION INTRAVENOUS at 08:28

## 2018-10-11 RX ADMIN — SODIUM CHLORIDE 1000 ML: 0.9 INJECTION, SOLUTION INTRAVENOUS at 10:28

## 2018-10-12 ENCOUNTER — DOCUMENTATION (OUTPATIENT)
Dept: NEPHROLOGY | Facility: CLINIC | Age: 68
End: 2018-10-12

## 2018-10-12 NOTE — PROGRESS NOTES
Patient had her saline supression test yesterday 10/11/18  Results are still pending in EMR  Thank you

## 2018-10-16 ENCOUNTER — TELEPHONE (OUTPATIENT)
Dept: NEPHROLOGY | Facility: CLINIC | Age: 68
End: 2018-10-16

## 2018-10-16 LAB
ALDOST SERPL-MCNC: 1.6 NG/DL (ref 0–30)
ALDOST SERPL-MCNC: 21 NG/DL (ref 0–30)

## 2018-10-16 NOTE — TELEPHONE ENCOUNTER
----- Message from Miguel Moreno MD sent at 10/16/2018  8:41 AM EDT -----  Please inform the patient that the test came back normal

## 2018-10-17 LAB — RENIN PLAS-CCNC: 0.24 NG/ML/HR (ref 0.17–5.38)

## 2018-10-19 LAB — RENIN PLAS-CCNC: 0.44 NG/ML/HR (ref 0.17–5.38)

## 2018-11-27 ENCOUNTER — OFFICE VISIT (OUTPATIENT)
Dept: PULMONOLOGY | Facility: CLINIC | Age: 68
End: 2018-11-27
Payer: MEDICARE

## 2018-11-27 VITALS
DIASTOLIC BLOOD PRESSURE: 70 MMHG | BODY MASS INDEX: 28.53 KG/M2 | HEART RATE: 79 BPM | OXYGEN SATURATION: 94 % | WEIGHT: 161 LBS | SYSTOLIC BLOOD PRESSURE: 120 MMHG | TEMPERATURE: 98.2 F | HEIGHT: 63 IN | RESPIRATION RATE: 14 BRPM

## 2018-11-27 DIAGNOSIS — G47.33 OSA (OBSTRUCTIVE SLEEP APNEA): Primary | ICD-10-CM

## 2018-11-27 DIAGNOSIS — J40 BRONCHITIS: ICD-10-CM

## 2018-11-27 DIAGNOSIS — Z87.891 FORMER SMOKER: ICD-10-CM

## 2018-11-27 DIAGNOSIS — R06.00 DYSPNEA, UNSPECIFIED TYPE: ICD-10-CM

## 2018-11-27 DIAGNOSIS — G25.81 RLS (RESTLESS LEGS SYNDROME): ICD-10-CM

## 2018-11-27 PROCEDURE — 99205 OFFICE O/P NEW HI 60 MIN: CPT | Performed by: INTERNAL MEDICINE

## 2018-11-27 PROCEDURE — 94618 PULMONARY STRESS TESTING: CPT | Performed by: INTERNAL MEDICINE

## 2018-11-27 PROCEDURE — 94060 EVALUATION OF WHEEZING: CPT | Performed by: INTERNAL MEDICINE

## 2018-11-27 RX ORDER — OMEPRAZOLE 40 MG/1
40 CAPSULE, DELAYED RELEASE ORAL DAILY
COMMUNITY
Start: 2018-10-26 | End: 2020-05-05 | Stop reason: HOSPADM

## 2018-11-27 RX ORDER — FLUCONAZOLE 150 MG/1
150 TABLET ORAL ONCE
Qty: 1 TABLET | Refills: 0 | Status: SHIPPED | OUTPATIENT
Start: 2018-11-27 | End: 2018-11-27

## 2018-11-27 RX ORDER — LOSARTAN POTASSIUM 50 MG/1
50 TABLET ORAL DAILY
COMMUNITY
Start: 2018-10-17 | End: 2020-05-05 | Stop reason: HOSPADM

## 2018-11-27 RX ORDER — EZETIMIBE 10 MG/1
10 TABLET ORAL DAILY
COMMUNITY
Start: 2018-10-02 | End: 2019-11-13 | Stop reason: ALTCHOICE

## 2018-11-27 RX ORDER — AZITHROMYCIN 250 MG/1
TABLET, FILM COATED ORAL
Qty: 6 TABLET | Refills: 0 | Status: SHIPPED | OUTPATIENT
Start: 2018-11-27 | End: 2018-12-01

## 2018-11-27 RX ORDER — OMEGA-3-ACID ETHYL ESTERS 1 G/1
CAPSULE, LIQUID FILLED ORAL
COMMUNITY
Start: 2018-11-02 | End: 2020-05-05 | Stop reason: HOSPADM

## 2018-11-27 RX ORDER — ALBUTEROL SULFATE 2.5 MG/3ML
2.5 SOLUTION RESPIRATORY (INHALATION) EVERY 6 HOURS PRN
Status: CANCELLED | OUTPATIENT
Start: 2018-11-27

## 2018-11-27 NOTE — LETTER
November 27, 2018     Osmany Florentin, [address] [place] [address]     Patient: Karen Delarosa   YOB: 1950   Date of Visit: 11/27/2018       Dear Dr Temo Singleton:    Thank you for referring Karen Delarosa to me for evaluation  Below are my notes for this consultation  If you have questions, please do not hesitate to call me  I look forward to following your patient along with you  Sincerely,        Colleen Aguirre DO        CC: No Recipients  Colleen Aguirre DO  11/27/2018 11:07 PM  Sign at close encounter  Pulmonary Consultation   Karen Delarosa 70 y o  female MRN: [id number]      Reason for consultation: BARBARA, dyspnea    Requesting physician: Dr Temo Singleton    Assessment/Plan  70 y o  F with PMHx of DM, HTN, Hyperlipidemia and hiatal hernia s/p repair who comes in for management of BARBARA s/p UPPP on CPAP  1   Mild BARBARA (AHI - 13 1) on CPAP of 10 with 3L bled in due to nocturnal hypoxia  Residual AHI on machine today was adequate at 2 6  However, compliance with the machine was poor at only 1-3 hrs of use  -  Check an ABG to assess for hypercapnia  She may benefit from BiPAP for hypoxia  -  Will switch to auto-titrating CPAP 10-20 with Cflex 3cc to see if this is helpful for tolerance  -  I strongly encouraged more consistent use of CPAP as well      -  I asked that she go to [facility] for a mask fitting  New Rx added for CPAP supplies      -  I also discussed in depth the risk of leaving sleep apnea untreated including hypertension, heart failure, arrhythmia, MI and stroke  2   RLS and significant neuropathy -  She finds that Mirapex is helpful for her  I recommended she continue that at this time  She is also taking lyrica for neuropathic pain but the three times a day is adding to sleepiness  I recommended she decrease to BID with focus on night time dose  3   Bronchitis - she recently got ill and is still coughing up thick yellow sputum        - treat for tracheobronchitis with 5 days of zithromax  4   Dyspnea - Differential includes obesity, deconditioning, diastolic HF and possible pulmonary hypertension  Spirometry is unremarkable  6 minute walk with borderline desaturation       -  [facility]      -  Check CXR PA and lateral to see if there is parenchymal lung disease      -  Can trial incruse to see if there is any clinical improvement, though I doubt it based in spirometry  History of Present Illness   HPI:  Yahir Dougherty is a 70 y o  female with PMHx as below who comes in for management of BARBARA  She states that she had CPAP for approximately 4 years  It was provided while she was in [place] and she did note some mild improvement in snoring and fatigue when she first started  However, she is only currently wearing her mask 1-3 hours due to frustration with the mask  She denies significant leak or morning headaches  Patient notes 20 weight gain and symptoms of difficulty staying asleep, snoring, excessive daytime sleepiness with an Austwell score of 15, awakenings with gasping, and awakenings with dry mouth  she does note symptoms of restless legs and is on Mirapex and lyrica  she denies symptoms of cataplexy, sleep paralysis, hypnopompic or hypnagogic hallucinations  Sleep History:  she goes to bed at approximately 11pm, will get to sleep in 20 min, will get out of bed at 5-6 am   she will get up 2-3 times at night to go to the bathroom and fighting with CPAP tubing  It will then take up to 1 hr to fall back asleep    she does nap during he day  The naps are 1hr in duration and are refreshing  Pulm HPI:  She states that she has noted some mild dyspnea on exertion when going up a flight of stairs  She denies wheezing, chest tightness, lightheadedness or dizziness  She has never been hospitalized for trouble breathing  She does not have any inhalers        ROS:   Review of Systems Constitutional: Positive for fatigue  HENT: Positive for congestion, postnasal drip and tinnitus  Negative for nosebleeds, sinus pain and voice change  Eyes: Negative  Diplopia   Respiratory: Positive for cough, shortness of breath and wheezing  Negative for chest tightness  Cardiovascular: Positive for leg swelling  Gastrointestinal: Positive for abdominal pain, constipation and nausea  Heartburn   Endocrine: Negative  Genitourinary: Positive for difficulty urinating, dysuria and frequency  Musculoskeletal: Negative for back pain, joint swelling and neck pain  Joint pain and stiffness   Skin: Negative  Allergic/Immunologic: Negative  Neurological: Negative  Hematological: Negative  Psychiatric/Behavioral: Positive for dysphoric mood  Negative for agitation and suicidal ideas  The patient is not nervous/anxious  Historical Information   Past Medical History:   Diagnosis Date    Anxiety     Arthritis     [facility]     Family history of thyroid problem     Heart burn     [facility], [facility]     Hypertension     Kidney problem     Seasonal allergies     Sleep apnea     Swollen ankles      Past Surgical History:   Procedure Laterality Date    ARTHROSCOPY KNEE      BREAST BIOPSY      HIATAL HERNIA REPAIR      LIPOSUCTION      REDUCTION MAMMAPLASTY      SQUAMOUS CELL CARCINOMA EXCISION      TONSILLECTOMY      TUBAL LIGATION       Family History   Problem Relation Age of Onset    Heart disease Mother     Heart disease Father     Cancer Father         lung cancer     Social History     Social History    Marital status: /Civil Union     Spouse name: N/A    Number of children: N/A    Years of education: N/A     Occupational History    Not on file       Social History Main Topics    Smoking status: Former Smoker     Packs/day: 2 00     Years: 10 00     Types: Cigarettes     Quit date: 1976    Smokeless tobacco: Never Used      Comment: quit in '76    Alcohol use Yes      Comment: rare    Drug use: No    Sexual activity: Not on file     Other Topics Concern    Not on file     Social History Narrative    No narrative on file       Occupational History: respiratory therapist     Meds/Allergies   Allergies   Allergen Reactions    Ciprofloxacin Hives       Home medications:  Prior to Admission medications    Medication Sig Start Date End Date Taking?  Authorizing Provider   amLODIPine (NORVASC) 2 5 mg tablet Take 1 tablet (2 5 mg total) by mouth daily 10/8/18  Yes [address] [place] [address], [facility]   b complex vitamins tablet Take 1 tablet by mouth 2 (two) times a day   Yes Historical Provider, MD   Calcium Carbonate (CALCIUM 600 PO) Take 1 capsule by mouth 2 (two) times a day   Yes Historical Provider, MD   cyclobenzaprine (FLEXERIL) 10 mg tablet Take 10 mg by mouth daily   Yes Historical Provider, MD   diclofenac sodium (VOLTAREN) 1 % Apply 2 g topically as needed   Yes Historical Provider, MD   DULoxetine (CYMBALTA) 60 mg delayed release capsule Take 60 mg by mouth daily   Yes Historical Provider, MD   fenofibrate (TRICOR) 145 mg tablet Take 145 mg by mouth daily   Yes Historical Provider, MD   glimepiride (AMARYL) 2 mg tablet Take 2 mg by mouth 2 (two) times a day   Yes Historical Provider, MD   insulin detemir (LEVEMIR) 100 units/mL subcutaneous injection Inject 25 Units under the skin daily at bedtime   Yes Historical Provider, MD   levothyroxine 175 mcg tablet Take 175 mcg by mouth daily   Yes Historical Provider, MD   Linagliptin (TRADJENTA) 5 MG TABS Take 5 mg by mouth daily   Yes Historical Provider, MD   Magnesium 500 MG CAPS Take 1 capsule by mouth 3 (three) times a day   Yes Historical Provider, MD   metoprolol tartrate (LOPRESSOR) 50 mg tablet Take 50 mg by mouth every 12 (twelve) hours     Yes Nando Maldonado MD   Multiple Vitamin (MULTIVITAMIN) capsule Take 1 capsule by mouth daily   Yes Historical Provider, MD pramipexole (MIRAPEX) 0 25 mg tablet Take 0 25 mg by mouth daily   Yes Historical Provider, MD   rosuvastatin (CRESTOR) 5 mg tablet Take 5 mg by mouth daily   Yes Historical Provider, MD   triamterene-hydrochlorothiazide (DYAZIDE) 37 5-25 mg per capsule Take 1 capsule by mouth every morning   Yes Historical Provider, MD   losartan (COZAAR) 100 MG tablet Take 50 mg by mouth daily    11/27/18 Yes Nando Maldonado MD   OMEPRAZOLE PO Take 1 tablet by mouth daily  11/27/18 Yes Historical Provider, MD   ALENDRONATE SODIUM PO Take 1 tablet by mouth once a week    Historical Provider, MD   azithromycin (ZITHROMAX) 250 mg tablet Take 2 tablets today then 1 tablet daily x 4 days 11/27/18 12/1/18  Matheus Hatch DO   ezetimibe (ZETIA) 10 mg tablet  10/2/18   Historical Provider, MD   fluconazole (DIFLUCAN) 150 mg tablet Take 1 tablet (150 mg total) by mouth once for 1 dose 11/27/18 11/27/18  Matheus Htach DO   losartan (COZAAR) 50 mg tablet  10/17/18   Historical Provider, MD   omega-3-acid ethyl esters (LOVAZA) 1 g capsule  11/2/18   Historical Provider, MD   omeprazole (PriLOSEC) 40 MG capsule  10/26/18   Historical Provider, MD       Vitals:   Blood pressure 120/70, pulse 79, temperature 98 2 °F (36 8 °C), resp  rate 14, height 5' 3" (1 6 m), weight 73 kg (161 lb), SpO2 94 % , RA, Body mass index is 28 52 kg/m²         Physical Exam  General: Pleasant, obese, Awake alert and oriented x 3, conversant without conversational dyspnea, NAD, normal affect  HEENT:  PERRL, Sclera noninjected, nonicteric OU, Nares patent,  no craniofacial abnormalities, Mucous membranes, moist, no oral lesions, normal dentition, Mallampati class 4,  S/p UPPP procedure  NECK: Trachea midline, no accessory muscle use, no stridor, no cervical or supraclavicular adenopathy, JVP not elevated  CARDIAC: Reg, single s1/S2, no m/r/g  PULM: CTA bilaterally no wheezing, rhonchi or rales  ABD: Normoactive bowel sounds, soft nontender, nondistended, no rebound, no rigidity, no guarding  EXT: No cyanosis, no clubbing, + lower extremity edema, normal capillary refill  NEURO: no focal neurologic deficits, AAOx3, moving all extremities appropriately    PFTs:  The most recent pulmonary function tests were reviewed  Daril Soulier in office today  Prebronchodilator  FVC - 2 29 (79%)  FEV1 - 2 20 (83%)  FEV1/FVC - 76%    Post bronchodilator  FVC - 2 37 (82%)  FEV1 - 1 86 (84%)  FEV1/FVC -  78%  Very mild restriction      6 minute walk today  Starting saturation - 94%   Starting HR - 74 bpm  Lowest saturation - 90%    Highest HR - 94 bpm  Ambulated - 252 m without the need for oxygen    Imaging  I personally reviewed the images on the [facility] system pertinent to today's visit  No imaging noted in system    Cardiac:  Echo performed at [facility]    Sleep studies:  Diagnostic: 2011 -  AHI 13 1, severe hypoxia (sat 66%)    Compliance Data:  Type of CPAP:  10                                   Average time used: 2 hrs                                   Residual AHI: 2 2                                       DO Anni James 73 Sleep Physician

## 2018-11-28 NOTE — PROGRESS NOTES
Pulmonary Consultation   Philip Prince 70 y o  female MRN: [id number]      Reason for consultation: BARBARA, dyspnea    Requesting physician: Dr Farnaz Tim    Assessment/Plan  70 y o  F with PMHx of DM, HTN, Hyperlipidemia and hiatal hernia s/p repair who comes in for management of BARBARA s/p UPPP on CPAP  1   Mild BARBARA (AHI - 13 1) on CPAP of 10 with 3L bled in due to nocturnal hypoxia  Residual AHI on machine today was adequate at 2 6  However, compliance with the machine was poor at only 1-3 hrs of use  -  Check an ABG to assess for hypercapnia  She may benefit from BiPAP for hypoxia  -  Will switch to auto-titrating CPAP 10-20 with Cflex 3cc to see if this is helpful for tolerance  -  I strongly encouraged more consistent use of CPAP as well      -  I asked that she go to [facility] for a mask fitting  New Rx added for CPAP supplies      -  I also discussed in depth the risk of leaving sleep apnea untreated including hypertension, heart failure, arrhythmia, MI and stroke  2   RLS and significant neuropathy -  She finds that Mirapex is helpful for her  I recommended she continue that at this time  She is also taking lyrica for neuropathic pain but the three times a day is adding to sleepiness  I recommended she decrease to BID with focus on night time dose  3   Bronchitis - she recently got ill and is still coughing up thick yellow sputum  - treat for tracheobronchitis with 5 days of zithromax  4   Dyspnea - Differential includes obesity, deconditioning, diastolic HF and possible pulmonary hypertension  Spirometry is unremarkable  6 minute walk with borderline desaturation       -  Obtain echocardiogram from [facility]      -  Check CXR PA and lateral to see if there is parenchymal lung disease      -  Can trial incruse to see if there is any clinical improvement, though I doubt it based in spirometry          History of Present Illness   HPI:  Philip Prince is a 70 y o  female with PMHx as below who comes in for management of BARBARA  She states that she had CPAP for approximately 4 years  It was provided while she was in [place] and she did note some mild improvement in snoring and fatigue when she first started  However, she is only currently wearing her mask 1-3 hours due to frustration with the mask  She denies significant leak or morning headaches  Patient notes 20 weight gain and symptoms of difficulty staying asleep, snoring, excessive daytime sleepiness with an [place] score of 15, awakenings with gasping, and awakenings with dry mouth  she does note symptoms of restless legs and is on Mirapex and lyrica  she denies symptoms of cataplexy, sleep paralysis, hypnopompic or hypnagogic hallucinations  Sleep History:  she goes to bed at approximately 11pm, will get to sleep in 20 min, will get out of bed at 5-6 am   she will get up 2-3 times at night to go to the bathroom and fighting with CPAP tubing  It will then take up to 1 hr to fall back asleep    she does nap during he day  The naps are 1hr in duration and are refreshing  Pulm HPI:  She states that she has noted some mild dyspnea on exertion when going up a flight of stairs  She denies wheezing, chest tightness, lightheadedness or dizziness  She has never been hospitalized for trouble breathing  She does not have any inhalers  ROS:   Review of Systems   Constitutional: Positive for fatigue  HENT: Positive for congestion, postnasal drip and tinnitus  Negative for nosebleeds, sinus pain and voice change  Eyes: Negative  Diplopia   Respiratory: Positive for cough, shortness of breath and wheezing  Negative for chest tightness  Cardiovascular: Positive for leg swelling  Gastrointestinal: Positive for abdominal pain, constipation and nausea  Heartburn   Endocrine: Negative  Genitourinary: Positive for difficulty urinating, dysuria and frequency     Musculoskeletal: Negative for back pain, joint swelling and neck pain  Joint pain and stiffness   Skin: Negative  Allergic/Immunologic: Negative  Neurological: Negative  Hematological: Negative  Psychiatric/Behavioral: Positive for dysphoric mood  Negative for agitation and suicidal ideas  The patient is not nervous/anxious  Historical Information   Past Medical History:   Diagnosis Date    Anxiety     Arthritis     [facility] [address] [facility]     Family history of thyroid problem     Heart burn     [facility], [facility]     Hypertension     Kidney problem     Seasonal allergies     Sleep apnea     Swollen ankles      Past Surgical History:   Procedure Laterality Date    ARTHROSCOPY KNEE      BREAST BIOPSY      HIATAL HERNIA REPAIR      LIPOSUCTION      REDUCTION MAMMAPLASTY      SQUAMOUS CELL CARCINOMA EXCISION      TONSILLECTOMY      TUBAL LIGATION       Family History   Problem Relation Age of Onset    Heart disease Mother     Heart disease Father     Cancer Father         lung cancer     Social History     Social History    Marital status: /Civil Union     Spouse name: N/A    Number of children: N/A    Years of education: N/A     Occupational History    Not on file  Social History Main Topics    Smoking status: Former Smoker     Packs/day: 2 00     Years: 10 00     Types: Cigarettes     Quit date: 1976    Smokeless tobacco: Never Used      Comment: quit in '76    Alcohol use Yes      Comment: rare    Drug use: No    Sexual activity: Not on file     Other Topics Concern    Not on file     Social History Narrative    No narrative on file       Occupational History: respiratory therapist     Meds/Allergies   Allergies   Allergen Reactions    Ciprofloxacin Hives       Home medications:  Prior to Admission medications    Medication Sig Start Date End Date Taking?  Authorizing Provider   amLODIPine (NORVASC) 2 5 mg tablet Take 1 tablet (2 5 mg total) by mouth daily 10/8/18  Yes Jonathan Conway PA-C   b complex vitamins tablet Take 1 tablet by mouth 2 (two) times a day   Yes Historical Provider, MD   Calcium Carbonate (CALCIUM 600 PO) Take 1 capsule by mouth 2 (two) times a day   Yes Historical Provider, MD   cyclobenzaprine (FLEXERIL) 10 mg tablet Take 10 mg by mouth daily   Yes Historical Provider, MD   diclofenac sodium (VOLTAREN) 1 % Apply 2 g topically as needed   Yes Historical Provider, MD   DULoxetine (CYMBALTA) 60 mg delayed release capsule Take 60 mg by mouth daily   Yes Historical Provider, MD   fenofibrate (TRICOR) 145 mg tablet Take 145 mg by mouth daily   Yes Historical Provider, MD   glimepiride (AMARYL) 2 mg tablet Take 2 mg by mouth 2 (two) times a day   Yes Historical Provider, MD   insulin detemir (LEVEMIR) 100 units/mL subcutaneous injection Inject 25 Units under the skin daily at bedtime   Yes Historical Provider, MD   levothyroxine 175 mcg tablet Take 175 mcg by mouth daily   Yes Historical Provider, MD   Linagliptin (TRADJENTA) 5 MG TABS Take 5 mg by mouth daily   Yes Historical Provider, MD   Magnesium 500 MG CAPS Take 1 capsule by mouth 3 (three) times a day   Yes Historical Provider, MD   metoprolol tartrate (LOPRESSOR) 50 mg tablet Take 50 mg by mouth every 12 (twelve) hours     Yes Mynor Solomon MD   Multiple Vitamin (MULTIVITAMIN) capsule Take 1 capsule by mouth daily   Yes Historical Provider, MD   pramipexole (MIRAPEX) 0 25 mg tablet Take 0 25 mg by mouth daily   Yes Historical Provider, MD   rosuvastatin (CRESTOR) 5 mg tablet Take 5 mg by mouth daily   Yes Historical Provider, MD   triamterene-hydrochlorothiazide (DYAZIDE) 37 5-25 mg per capsule Take 1 capsule by mouth every morning   Yes Historical Provider, MD   losartan (COZAAR) 100 MG tablet Take 50 mg by mouth daily    11/27/18 Yes Mynor Solomon MD   OMEPRAZOLE PO Take 1 tablet by mouth daily  11/27/18 Yes Historical Provider, MD   ALENDRONATE SODIUM PO Take 1 tablet by mouth once a week    Historical Provider, MD   azithromycin (ZITHROMAX) 250 mg tablet Take 2 tablets today then 1 tablet daily x 4 days 11/27/18 12/1/18  Jenene Cabot, DO   ezetimibe (ZETIA) 10 mg tablet  10/2/18   Historical Provider, MD   fluconazole (DIFLUCAN) 150 mg tablet Take 1 tablet (150 mg total) by mouth once for 1 dose 11/27/18 11/27/18  Jenene Cabot, DO   losartan (COZAAR) 50 mg tablet  10/17/18   Historical Provider, MD   omega-3-acid ethyl esters (LOVAZA) 1 g capsule  11/2/18   Historical Provider, MD   omeprazole (PriLOSEC) 40 MG capsule  10/26/18   Historical Provider, MD       Vitals:   Blood pressure 120/70, pulse 79, temperature 98 2 °F (36 8 °C), resp  rate 14, height 5' 3" (1 6 m), weight 73 kg (161 lb), SpO2 94 % , RA, Body mass index is 28 52 kg/m²  Physical Exam  General: Pleasant, obese, Awake alert and oriented x 3, conversant without conversational dyspnea, NAD, normal affect  HEENT:  PERRL, Sclera noninjected, nonicteric OU, Nares patent,  no craniofacial abnormalities, Mucous membranes, moist, no oral lesions, normal dentition, Mallampati class 4,  S/p UPPP procedure  NECK: Trachea midline, no accessory muscle use, no stridor, no cervical or supraclavicular adenopathy, JVP not elevated  CARDIAC: Reg, single s1/S2, no m/r/g  PULM: CTA bilaterally no wheezing, rhonchi or rales  ABD: Normoactive bowel sounds, soft nontender, nondistended, no rebound, no rigidity, no guarding  EXT: No cyanosis, no clubbing, + lower extremity edema, normal capillary refill  NEURO: no focal neurologic deficits, AAOx3, moving all extremities appropriately    PFTs:  The most recent pulmonary function tests were reviewed  Aimee Morton in office today  Prebronchodilator  FVC - 2 29 (79%)  FEV1 - 2 20 (83%)  FEV1/FVC - 76%    Post bronchodilator  FVC - 2 37 (82%)  FEV1 - 1 86 (84%)  FEV1/FVC -  78%  Very mild restriction      6 minute walk today  Starting saturation - 94%   Starting HR - 74 bpm  Lowest saturation - 90%    Highest HR - 94 bpm  Ambulated - 80 m without the need for oxygen    Imaging  I personally reviewed the [facility] pertinent to today's visit  No imaging noted in system    Cardiac:  Echo performed at [facility]    Sleep studies:  Diagnostic: 2011 -  AHI 13 1, severe hypoxia (sat 66%)    Compliance Data:  Type of CPAP:  10                                   Average time used: 2 hrs                                   Residual AHI: 2 2                                       DO Anni Sr 72 Sleep Physician

## 2018-11-30 ENCOUNTER — TRANSCRIBE ORDERS (OUTPATIENT)
Dept: ADMINISTRATIVE | Facility: HOSPITAL | Age: 68
End: 2018-11-30

## 2018-11-30 ENCOUNTER — HOSPITAL ENCOUNTER (OUTPATIENT)
Dept: PULMONOLOGY | Facility: HOSPITAL | Age: 68
Discharge: HOME | End: 2018-11-30
Attending: INTERNAL MEDICINE
Payer: MEDICARE

## 2018-11-30 ENCOUNTER — HOSPITAL ENCOUNTER (OUTPATIENT)
Dept: RADIOLOGY | Facility: HOSPITAL | Age: 68
Discharge: HOME | End: 2018-11-30
Attending: INTERNAL MEDICINE
Payer: MEDICARE

## 2018-11-30 DIAGNOSIS — Z87.891 FORMER SMOKER: ICD-10-CM

## 2018-11-30 DIAGNOSIS — G47.33 OSA (OBSTRUCTIVE SLEEP APNEA): ICD-10-CM

## 2018-11-30 DIAGNOSIS — J40 BRONCHITIS: ICD-10-CM

## 2018-11-30 LAB
ARTERIAL PATENCY WRIST A: ABNORMAL
BASE EXCESS BLDA CALC-SCNC: 2 MMOL/L (ref -2–3)
CA-I BLD-SCNC: 1.26 MMOL/L (ref 1.12–1.32)
FIO2 GAS DIL.REBREATH: 21 L
GLUCOSE SERPL-MCNC: 168 MG/DL (ref 65–140)
HCO3 BLDA-SCNC: 26.8 MMOL/L (ref 22–28)
HCT VFR BLD CALC: 36 % (ref 34.8–46.1)
HGB BLDA-MCNC: 12.2 G/DL (ref 11.5–15.4)
PCO2 BLD: 28 MMOL/L (ref 21–32)
PCO2 BLD: 40.3 MM HG (ref 36–44)
PH BLD: 7.43 [PH] (ref 7.35–7.45)
PO2 BLD: 81 MM HG (ref 75–129)
POTASSIUM BLD-SCNC: 4.2 MMOL/L (ref 3.5–5.3)
SAMPLE SITE: 1
SAO2 % BLD FROM PO2: 96 % (ref 95–98)
SODIUM BLD-SCNC: 135 MMOL/L (ref 136–145)
SPECIMEN SOURCE: ABNORMAL

## 2018-11-30 PROCEDURE — 82803 BLOOD GASES ANY COMBINATION: CPT

## 2018-11-30 PROCEDURE — 84295 ASSAY OF SERUM SODIUM: CPT

## 2018-11-30 PROCEDURE — 71046 X-RAY EXAM CHEST 2 VIEWS: CPT

## 2018-11-30 PROCEDURE — 84132 ASSAY OF SERUM POTASSIUM: CPT

## 2018-11-30 PROCEDURE — 85014 HEMATOCRIT: CPT

## 2018-11-30 PROCEDURE — 82947 ASSAY GLUCOSE BLOOD QUANT: CPT

## 2018-11-30 PROCEDURE — 82330 ASSAY OF CALCIUM: CPT

## 2018-11-30 PROCEDURE — 36600 WITHDRAWAL OF ARTERIAL BLOOD: CPT

## 2019-01-14 LAB
CREAT ?TM UR-SCNC: 72.7 UMOL/L
HBA1C MFR BLD HPLC: 8.2 %
MICROALBUMIN UR-MCNC: 9.8 MG/L (ref 0–20)
MICROALBUMIN/CREAT UR: 134.8 MG/G{CREAT}

## 2019-01-15 ENCOUNTER — TELEPHONE (OUTPATIENT)
Dept: NEPHROLOGY | Facility: CLINIC | Age: 69
End: 2019-01-15

## 2019-01-15 DIAGNOSIS — N18.30 HYPERTENSIVE KIDNEY DISEASE WITH STAGE 3 CHRONIC KIDNEY DISEASE (HCC): ICD-10-CM

## 2019-01-15 DIAGNOSIS — I12.9 HYPERTENSIVE KIDNEY DISEASE WITH STAGE 3 CHRONIC KIDNEY DISEASE (HCC): ICD-10-CM

## 2019-01-15 DIAGNOSIS — N18.30 CKD (CHRONIC KIDNEY DISEASE), STAGE III (HCC): Primary | ICD-10-CM

## 2019-01-15 NOTE — TELEPHONE ENCOUNTER
----- Message from Rachele Darling MD sent at 1/15/2019 12:50 PM EST -----  The patient should have a CMP or at the very least a basic metabolic profile as well as a urine protein creatinine ratio

## 2019-01-30 LAB
EXT GLUCOSE BLD: 126
EXTERNAL ALBUMIN: 3.9
EXTERNAL ALK PHOS: 51
EXTERNAL ALT: 29
EXTERNAL AST: 23
EXTERNAL BUN: 56
EXTERNAL CALCIUM: 8.8
EXTERNAL CHLORIDE: 100
EXTERNAL CO2: 31
EXTERNAL CREATININE: 2.03
EXTERNAL EGFR: 24
EXTERNAL POTASSIUM: 4.1
EXTERNAL SODIUM: 135
EXTERNAL T.BILIRUBIN: 0.3
EXTERNAL TOTAL PROTEIN: 7
PROT/CREAT UR: 0.16 MG/G{CREAT} (ref 0–0.1)

## 2019-02-07 NOTE — PROGRESS NOTES
RENAL FOLLOW UP NOTE: td    ASSESSMENT AND PLAN:  1   CKD stage 3 :  · Etiology:  Diabetic nephropathy/hypertensive nephrosclerosis/arteriolar nephrosclerosis/chronic NSAID use  No evidence of obstructive uropathy, renal artery disease or primary glomerular disease with a bland urinalysis  Of note, CPK was normal at 105 no evidence rhabdomyolysis  · Baseline creatinine:  1 5-2 0  · Current creatinine:  2 03 at baseline  · Urine protein creatinine ratio:  0 16 g at goal  Recommendations:  · Treat hypertension-please see below  · Treat dyslipidemia-please see below  · Maintain proteinuria less than 1 g or as low as possible  · Avoid nephrotoxic agents such as NSAIDs, patient counseled as such  2   Volume:  Euvolemic  3  Hypertension:  Workup:  -saline suppression test for primary aldosterone state was normal  -plasma free metanephrines was negative  -RENAL ARTERY DUPLEX NOT DONE       Current blood pressure averages:  AM:  128/73, standing 128/74  PM:  127/70, standing 119/71  Heart rate 60-70    · Goal blood pressure:  120-125/less than 80  Recommendations:  ·  No changes today as she is close to goal   Once she is able to exercise she will pursue exercise, low-salt diet and weight loss  For now do not really had any diuretic  4   Electrolytes:  Essentially normal, sodium 135 but corrected for glucose approximately 136  5  Mineral bone disorder:  · Calcium/magnesium/phosphorus:  Calcium was normal, phosphorus magnesium not done  · PTH intact:  11 7  · Vitamin-D:  Was 20:  Ergo calciferol/vitamin-D over-the-counter  6  Dyslipidemia:  · Goal LDL:  Less than 100  · Current lipid profile:  LDL unobtainable, triglycerides are 382, HDL 27  Recommendations:  Per Endocrinology  7  Anemia:  Hemoglobin acceptable 12 3  8    Other problems:  · Depression  · Diabetes mellitus per primary medical physician  · Hypothyroidism  · BARBARA on CPAP  · Fibromyalgia/osteoarthritis  · Nephrolithiasis  · Basal cell/squamous CA of the skin  · Urinary stress incontinence  · Current          PATIENT INSTRUCTIONS:    Patient Instructions   1  Medication change today:  -please begin ergo calciferol 71395 units weekly for 12 weeks  This is a type of vitamin-D   -please take vitamin-D 2000 units over the counter on a daily basis    2  Please go for nonfasting lab work in the next couple of weeks any time of the day    3  Follow-up in 4 months:  -please bring in 1 week a blood pressure readings morning and evening, sitting and standing on right arm only:  · Take the morning readings before any medications  · Take the evening readings closer to bedtime  · When taking standing readings, keep your arm supported at heart level and not dangling  -please go for lab work nonfasting but in the morning    4  General instructions:  -avoid salt  -avoid medications such as Motrin, Naprosyn, ibuprofen, Aleve or Advil or Celebrex as they can affect your kidney function; you can use Tylenol as needed for pain or fevers if you have no liver problems  -avoid medications such as Sudafed or other medications with decongestants as they can raise your blood pressure  -try to exercise at least 30 minutes at least 3 days a week with an ultimate goal of 5 days a week  -try to lose 5-10 lb by your next visit            Subjective:   She had a spur removed from her right great toe a few days ago  Now she has bronchitis with some brown colored sputum, no fevers or chills  Placed on Zithromax and prednisone by emergency room physician  Overall improving  Patient noted a decrease in appetite on new diabetic medication Trulicity  Energy is reasonable  Patient notes foamy urine unchanged    No dysuria or gross hematuria  No GI symptoms, except for GERD  No chest pain, mild shortness of breath with her cough, very minimal lower extremity edema, and comes and goes  No headaches, slight amount of lightheadedness with standing up quickly  Blood pressure medications:  -lopressor 50 mg twice a day  -losartan 50 mg once a day in the a m   -amlodipine 2 5 mg daily in the morning    ROS:  See HPI, otherwise review of systems as completely reviewed with the patient are negative    Past Medical History:   Diagnosis Date    Anxiety     Arthritis     [facility] [address] [facility]     Family history of thyroid problem     Heart burn     Hyperplasia, parathyroid [facility]     Hypertension     Kidney problem     Seasonal allergies     Sleep apnea     Swollen ankles      Past Surgical History:   Procedure Laterality Date    ARTHROSCOPY KNEE      BREAST BIOPSY      HIATAL HERNIA REPAIR      LIPOSUCTION      REDUCTION MAMMAPLASTY      SQUAMOUS CELL CARCINOMA EXCISION      TOE SURGERY      TONSILLECTOMY      TUBAL LIGATION       Family History   Problem Relation Age of Onset    Heart disease Mother     Heart disease Father     Cancer Father         lung cancer      reports that she quit smoking about 43 years ago  Her smoking use included cigarettes  She has a 20 00 pack-year smoking history  She has never used smokeless tobacco  She reports that she drinks alcohol  She reports that she does not use drugs  I COMPLETELY REVIEWED THE PAST MEDICAL HISTORY/PAST SURGICAL HISTORY/SOCIAL HISTORY/FAMILY HISTORY/AND MEDICATIONS  AND UPDATED ALL    Objective:     Vitals:   BP sitting on right:  138/80 with a heart rate of 68 and regular  BP standing on right:  128/70 with a heart rate of 60 and regular    Automated Oscillimetric blood pressure:  132/65 with a heart rate of 65    Weight (last 2 days)     Date/Time   Weight    02/11/19 0838   72 7 (160 2)            Wt Readings from Last 3 Encounters:   02/11/19 72 7 kg (160 lb 3 2 oz)   02/08/19 72 6 kg (160 lb)   11/27/18 73 kg (161 lb)     Body mass index is 28 38 kg/m²      Physical Exam: General:  No acute distress  Skin:  No acute rash  Eyes:  No scleral icterus, noninjected  ENT:  Moist mucous membranes  Neck:  Supple, no jugular venous distention  Back   No CVAT  Chest:  Clear to auscultation and percussion, good respiratory effort  CVS:  Regular rate and rhythm without a rub, murmurs or gallops  Abdomen:  Mildly obese, Soft and nontender with normal bowel sounds  Extremities:  No cyanosis and no edema  Neuro:  Grossly intact  Psych:  Alert, oriented x3 and appropriate      Medications:    Current Outpatient Medications:     amLODIPine (NORVASC) 2 5 mg tablet, Take 1 tablet (2 5 mg total) by mouth daily, Disp: 30 tablet, Rfl: 2    azithromycin (ZITHROMAX) 250 mg tablet, Take 1 tablet (250 mg total) by mouth daily for 4 days, Disp: 4 tablet, Rfl: 0    b complex vitamins tablet, Take 1 tablet by mouth 2 (two) times a day, Disp: , Rfl:     Calcium Carbonate (CALCIUM 600 PO), Take 1 capsule by mouth 2 (two) times a day, Disp: , Rfl:     cyclobenzaprine (FLEXERIL) 10 mg tablet, Take 10 mg by mouth daily, Disp: , Rfl:     diclofenac sodium (VOLTAREN) 1 %, Apply 2 g topically as needed, Disp: , Rfl:     Dulaglutide (TRULICITY) 1 97 YA/4 9OT SOPN, Inject 0 75 mg under the skin once a week, Disp: , Rfl:     DULoxetine (CYMBALTA) 60 mg delayed release capsule, Take 60 mg by mouth daily, Disp: , Rfl:     ezetimibe (ZETIA) 10 mg tablet, Take 10 mg by mouth daily , Disp: , Rfl:     fenofibrate (TRICOR) 145 mg tablet, Take 145 mg by mouth daily, Disp: , Rfl:     glimepiride (AMARYL) 2 mg tablet, Take 2 mg by mouth 2 (two) times a day, Disp: , Rfl:     insulin detemir (LEVEMIR) 100 units/mL subcutaneous injection, Inject 25 Units under the skin daily at bedtime, Disp: , Rfl:     levothyroxine 175 mcg tablet, Take 175 mcg by mouth daily, Disp: , Rfl:     Linagliptin (TRADJENTA) 5 MG TABS, Take 5 mg by mouth daily, Disp: , Rfl:     losartan (COZAAR) 50 mg tablet, , Disp: , Rfl:     Magnesium 500 MG CAPS, Take 1 capsule by mouth 3 (three) times a day, Disp: , Rfl:     metoprolol tartrate (LOPRESSOR) 50 mg tablet, Take 50 mg by mouth every 12 (twelve) hours  , Disp: , Rfl:     Multiple Vitamin (MULTIVITAMIN) capsule, Take 1 capsule by mouth daily, Disp: , Rfl:     omega-3-acid ethyl esters (LOVAZA) 1 g capsule, , Disp: , Rfl:     omeprazole (PriLOSEC) 40 MG capsule, , Disp: , Rfl:     pramipexole (MIRAPEX) 0 25 mg tablet, Take 0 25 mg by mouth daily, Disp: , Rfl:     predniSONE 20 mg tablet, Take 1 tablet (20 mg total) by mouth daily for 4 days Take 3 tabs daily for 4 days, Disp: 12 tablet, Rfl: 0    rosuvastatin (CRESTOR) 5 mg tablet, Take 5 mg by mouth daily, Disp: , Rfl:     ALENDRONATE SODIUM PO, Take 1 tablet by mouth once a week, Disp: , Rfl:     ergocalciferol (VITAMIN D2) 50,000 units, Take 1 capsule (50,000 Units total) by mouth once a week for 12 doses, Disp: 12 capsule, Rfl: 0    triamterene-hydrochlorothiazide (DYAZIDE) 37 5-25 mg per capsule, Take 1 capsule by mouth every morning, Disp: , Rfl:     Lab, Imaging and other studies: I have personally reviewed pertinent labs  Laboratory Results:  Results for orders placed or performed during the hospital encounter of 02/08/19   Blood culture #1   Result Value Ref Range    Blood Culture No Growth at 48 hrs  Blood culture #2   Result Value Ref Range    Blood Culture No Growth at 48 hrs      CBC and differential   Result Value Ref Range    WBC 11 00 (H) 4 31 - 10 16 Thousand/uL    RBC 4 22 3 81 - 5 12 Million/uL    Hemoglobin 12 0 11 5 - 15 4 g/dL    Hematocrit 36 5 34 8 - 46 1 %    MCV 87 82 - 98 fL    MCH 28 4 26 8 - 34 3 pg    MCHC 32 9 31 4 - 37 4 g/dL    RDW 14 6 11 6 - 15 1 %    MPV 11 6 8 9 - 12 7 fL    Platelets 930 657 - 364 Thousands/uL    nRBC 0 /100 WBCs    Neutrophils Relative 76 (H) 43 - 75 %    Immat GRANS % 0 0 - 2 %    Lymphocytes Relative 17 14 - 44 %    Monocytes Relative 4 4 - 12 %    Eosinophils Relative 3 0 - 6 %    Basophils Relative 0 0 - 1 %    Neutrophils Absolute 8 38 (H) 1 85 - 7 62 Thousands/µL    Immature Grans Absolute 0 03 0 00 - 0 20 Thousand/uL Lymphocytes Absolute 1 88 0 60 - 4 47 Thousands/µL    Monocytes Absolute 0 42 0 17 - 1 22 Thousand/µL    Eosinophils Absolute 0 28 0 00 - 0 61 Thousand/µL    Basophils Absolute 0 01 0 00 - 0 10 Thousands/µL   Protime-INR   Result Value Ref Range    Protime 13 7 11 8 - 14 2 seconds    INR 1 08 0 86 - 1 17   APTT   Result Value Ref Range    PTT 28 26 - 38 seconds   Comprehensive metabolic panel   Result Value Ref Range    Sodium 135 (L) 136 - 145 mmol/L    Potassium 3 9 3 5 - 5 3 mmol/L    Chloride 96 (L) 100 - 108 mmol/L    CO2 28 21 - 32 mmol/L    ANION GAP 11 4 - 13 mmol/L    BUN 43 (H) 5 - 25 mg/dL    Creatinine 1 83 (H) 0 60 - 1 30 mg/dL    Glucose 152 (H) 65 - 140 mg/dL    Calcium 9 3 8 3 - 10 1 mg/dL    AST 23 5 - 45 U/L    ALT 32 12 - 78 U/L    Alkaline Phosphatase 49 46 - 116 U/L    Total Protein 6 9 6 4 - 8 2 g/dL    Albumin 3 5 3 5 - 5 0 g/dL    Total Bilirubin 0 40 0 20 - 1 00 mg/dL    eGFR 28 ml/min/1 73sq m   Lactic acid, plasma   Result Value Ref Range    LACTIC ACID 1 0 0 5 - 2 0 mmol/L   Troponin I   Result Value Ref Range    Troponin I <0 02 <=0 04 ng/mL   B-type natriuretic peptide   Result Value Ref Range    NT-proBNP 151 (H) <125 pg/mL   Blood gas, venous   Result Value Ref Range    pH, Tato 7 388 7 300 - 7 400    pCO2, Tato 47 3 42 0 - 50 0 mm Hg    pO2, Tato 36 2 35 0 - 45 0 mm Hg    HCO3, Tato 27 9 24 - 30 mmol/L    Base Excess, Atto 2 3 mmol/L    O2 Content, Tato 11 6 ml/dL    O2 HGB, VENOUS 68 0 60 0 - 80 0 %       Results from last 7 days   Lab Units 02/08/19  2349   WBC Thousand/uL 11 00*   HEMOGLOBIN g/dL 12 0   HEMATOCRIT % 36 5   PLATELETS Thousands/uL 270   POTASSIUM mmol/L 3 9   CHLORIDE mmol/L 96*   CO2 mmol/L 28   BUN mg/dL 43*   CREATININE mg/dL 1 83*   CALCIUM mg/dL 9 3         Radiology review:   chest X-ray    Ultrasound      Portions of the record may have been created with voice recognition software   Occasional wrong word or "sound a like" substitutions may have occurred due to the inherent limitations of voice recognition software   Read the chart carefully and recognize, using context, where substitutions have occurred

## 2019-02-08 ENCOUNTER — HOSPITAL ENCOUNTER (EMERGENCY)
Facility: HOSPITAL | Age: 69
Discharge: HOME | End: 2019-02-09
Attending: EMERGENCY MEDICINE | Admitting: EMERGENCY MEDICINE
Payer: MEDICARE

## 2019-02-08 ENCOUNTER — APPOINTMENT (EMERGENCY)
Dept: RADIOLOGY | Facility: HOSPITAL | Age: 69
End: 2019-02-08
Payer: MEDICARE

## 2019-02-08 VITALS
BODY MASS INDEX: 28.34 KG/M2 | TEMPERATURE: 98.9 F | HEART RATE: 108 BPM | RESPIRATION RATE: 18 BRPM | SYSTOLIC BLOOD PRESSURE: 185 MMHG | WEIGHT: 160 LBS | DIASTOLIC BLOOD PRESSURE: 82 MMHG | OXYGEN SATURATION: 95 %

## 2019-02-08 DIAGNOSIS — J20.9 BRONCHITIS WITH BRONCHOSPASM: Primary | ICD-10-CM

## 2019-02-08 LAB
BASE EX.OXY STD BLDV CALC-SCNC: 68 % (ref 60–80)
BASE EXCESS BLDV CALC-SCNC: 2.3 MMOL/L
BASOPHILS # BLD AUTO: 0.01 THOUSANDS/ΜL (ref 0–0.1)
BASOPHILS NFR BLD AUTO: 0 % (ref 0–1)
EOSINOPHIL # BLD AUTO: 0.28 THOUSAND/ΜL (ref 0–0.61)
EOSINOPHIL NFR BLD AUTO: 3 % (ref 0–6)
ERYTHROCYTE [DISTWIDTH] IN BLOOD BY AUTOMATED COUNT: 14.6 % (ref 11.6–15.1)
HCO3 BLDV-SCNC: 27.9 MMOL/L (ref 24–30)
HCT VFR BLD AUTO: 36.5 % (ref 34.8–46.1)
HGB BLD-MCNC: 12 G/DL (ref 11.5–15.4)
IMM GRANULOCYTES # BLD AUTO: 0.03 THOUSAND/UL (ref 0–0.2)
IMM GRANULOCYTES NFR BLD AUTO: 0 % (ref 0–2)
LYMPHOCYTES # BLD AUTO: 1.88 THOUSANDS/ΜL (ref 0.6–4.47)
LYMPHOCYTES NFR BLD AUTO: 17 % (ref 14–44)
MCH RBC QN AUTO: 28.4 PG (ref 26.8–34.3)
MCHC RBC AUTO-ENTMCNC: 32.9 G/DL (ref 31.4–37.4)
MCV RBC AUTO: 87 FL (ref 82–98)
MONOCYTES # BLD AUTO: 0.42 THOUSAND/ΜL (ref 0.17–1.22)
MONOCYTES NFR BLD AUTO: 4 % (ref 4–12)
NEUTROPHILS # BLD AUTO: 8.38 THOUSANDS/ΜL (ref 1.85–7.62)
NEUTS SEG NFR BLD AUTO: 76 % (ref 43–75)
NRBC BLD AUTO-RTO: 0 /100 WBCS
O2 CT BLDV-SCNC: 11.6 ML/DL
PCO2 BLDV: 47.3 MM HG (ref 42–50)
PH BLDV: 7.39 [PH] (ref 7.3–7.4)
PLATELET # BLD AUTO: 270 THOUSANDS/UL (ref 149–390)
PMV BLD AUTO: 11.6 FL (ref 8.9–12.7)
PO2 BLDV: 36.2 MM HG (ref 35–45)
RBC # BLD AUTO: 4.22 MILLION/UL (ref 3.81–5.12)
WBC # BLD AUTO: 11 THOUSAND/UL (ref 4.31–10.16)

## 2019-02-08 PROCEDURE — 87040 BLOOD CULTURE FOR BACTERIA: CPT | Performed by: EMERGENCY MEDICINE

## 2019-02-08 PROCEDURE — 83605 ASSAY OF LACTIC ACID: CPT | Performed by: EMERGENCY MEDICINE

## 2019-02-08 PROCEDURE — 94640 AIRWAY INHALATION TREATMENT: CPT

## 2019-02-08 PROCEDURE — 84484 ASSAY OF TROPONIN QUANT: CPT | Performed by: EMERGENCY MEDICINE

## 2019-02-08 PROCEDURE — 82805 BLOOD GASES W/O2 SATURATION: CPT | Performed by: EMERGENCY MEDICINE

## 2019-02-08 PROCEDURE — 85730 THROMBOPLASTIN TIME PARTIAL: CPT | Performed by: EMERGENCY MEDICINE

## 2019-02-08 PROCEDURE — 36415 COLL VENOUS BLD VENIPUNCTURE: CPT | Performed by: EMERGENCY MEDICINE

## 2019-02-08 PROCEDURE — 83880 ASSAY OF NATRIURETIC PEPTIDE: CPT | Performed by: EMERGENCY MEDICINE

## 2019-02-08 PROCEDURE — 99285 EMERGENCY DEPT VISIT HI MDM: CPT

## 2019-02-08 PROCEDURE — 85610 PROTHROMBIN TIME: CPT | Performed by: EMERGENCY MEDICINE

## 2019-02-08 PROCEDURE — 80053 COMPREHEN METABOLIC PANEL: CPT | Performed by: EMERGENCY MEDICINE

## 2019-02-08 PROCEDURE — 93005 ELECTROCARDIOGRAM TRACING: CPT

## 2019-02-08 PROCEDURE — 85025 COMPLETE CBC W/AUTO DIFF WBC: CPT | Performed by: EMERGENCY MEDICINE

## 2019-02-08 PROCEDURE — 71046 X-RAY EXAM CHEST 2 VIEWS: CPT

## 2019-02-08 RX ORDER — AZITHROMYCIN 250 MG/1
500 TABLET, FILM COATED ORAL ONCE
Status: COMPLETED | OUTPATIENT
Start: 2019-02-08 | End: 2019-02-09

## 2019-02-08 RX ORDER — ALBUTEROL SULFATE 2.5 MG/3ML
5 SOLUTION RESPIRATORY (INHALATION) ONCE
Status: COMPLETED | OUTPATIENT
Start: 2019-02-08 | End: 2019-02-09

## 2019-02-09 LAB
ALBUMIN SERPL BCP-MCNC: 3.5 G/DL (ref 3.5–5)
ALP SERPL-CCNC: 49 U/L (ref 46–116)
ALT SERPL W P-5'-P-CCNC: 32 U/L (ref 12–78)
ANION GAP SERPL CALCULATED.3IONS-SCNC: 11 MMOL/L (ref 4–13)
APTT PPP: 28 SECONDS (ref 26–38)
AST SERPL W P-5'-P-CCNC: 23 U/L (ref 5–45)
BILIRUB SERPL-MCNC: 0.4 MG/DL (ref 0.2–1)
BUN SERPL-MCNC: 43 MG/DL (ref 5–25)
CALCIUM SERPL-MCNC: 9.3 MG/DL (ref 8.3–10.1)
CHLORIDE SERPL-SCNC: 96 MMOL/L (ref 100–108)
CO2 SERPL-SCNC: 28 MMOL/L (ref 21–32)
CREAT SERPL-MCNC: 1.83 MG/DL (ref 0.6–1.3)
GFR SERPL CREATININE-BSD FRML MDRD: 28 ML/MIN/1.73SQ M
GLUCOSE SERPL-MCNC: 152 MG/DL (ref 65–140)
INR PPP: 1.08 (ref 0.86–1.17)
LACTATE SERPL-SCNC: 1 MMOL/L (ref 0.5–2)
NT-PROBNP SERPL-MCNC: 151 PG/ML
POTASSIUM SERPL-SCNC: 3.9 MMOL/L (ref 3.5–5.3)
PROT SERPL-MCNC: 6.9 G/DL (ref 6.4–8.2)
PROTHROMBIN TIME: 13.7 SECONDS (ref 11.8–14.2)
SODIUM SERPL-SCNC: 135 MMOL/L (ref 136–145)
TROPONIN I SERPL-MCNC: <0.02 NG/ML

## 2019-02-09 PROCEDURE — 96365 THER/PROPH/DIAG IV INF INIT: CPT

## 2019-02-09 RX ORDER — PREDNISONE 20 MG/1
20 TABLET ORAL DAILY
Qty: 12 TABLET | Refills: 0 | Status: SHIPPED | OUTPATIENT
Start: 2019-02-10 | End: 2019-02-14

## 2019-02-09 RX ORDER — PREDNISONE 20 MG/1
60 TABLET ORAL ONCE
Status: COMPLETED | OUTPATIENT
Start: 2019-02-09 | End: 2019-02-09

## 2019-02-09 RX ORDER — ALBUTEROL SULFATE 90 UG/1
2 AEROSOL, METERED RESPIRATORY (INHALATION) ONCE
Status: COMPLETED | OUTPATIENT
Start: 2019-02-09 | End: 2019-02-09

## 2019-02-09 RX ORDER — AZITHROMYCIN 250 MG/1
250 TABLET, FILM COATED ORAL DAILY
Qty: 4 TABLET | Refills: 0 | Status: SHIPPED | OUTPATIENT
Start: 2019-02-10 | End: 2019-02-14

## 2019-02-09 RX ADMIN — PREDNISONE 60 MG: 20 TABLET ORAL at 00:41

## 2019-02-09 RX ADMIN — ALBUTEROL SULFATE 2 PUFF: 90 AEROSOL, METERED RESPIRATORY (INHALATION) at 00:41

## 2019-02-09 RX ADMIN — CEFTRIAXONE SODIUM 1000 MG: 10 INJECTION, POWDER, FOR SOLUTION INTRAVENOUS at 00:00

## 2019-02-09 RX ADMIN — ALBUTEROL SULFATE 5 MG: 2.5 SOLUTION RESPIRATORY (INHALATION) at 00:01

## 2019-02-09 RX ADMIN — AZITHROMYCIN 500 MG: 250 TABLET, FILM COATED ORAL at 00:00

## 2019-02-09 NOTE — DISCHARGE INSTRUCTIONS
Acute Bronchitis   WHAT YOU NEED TO KNOW:   Acute bronchitis is swelling and irritation in the air passages of your lungs  This irritation may cause you to cough or have other breathing problems  Acute bronchitis often starts because of another illness, such as a cold or the flu  The illness spreads from your nose and throat to your windpipe and airways  Bronchitis is often called a chest cold  Acute bronchitis lasts about 3 to 6 weeks and is usually not a serious illness  Your cough can last for several weeks  DISCHARGE INSTRUCTIONS:   Return to the emergency department if:   · You cough up blood  · Your lips or fingernails turn blue  · You feel like you are not getting enough air when you breathe  Contact your healthcare provider if:   · You have a fever  · Your breathing problems do not go away or get worse  · Your cough does not get better within 4 weeks  · You have questions or concerns about your condition or care  Self-care:   · Get more rest   Rest helps your body to heal  Slowly start to do more each day  Rest when you feel it is needed  · Avoid irritants in the air  Avoid chemicals, fumes, and dust  Wear a face mask if you must work around dust or fumes  Stay inside on days when air pollution levels are high  If you have allergies, stay inside when pollen counts are high  Do not use aerosol products, such as spray-on deodorant, bug spray, and hair spray  · Do not smoke or be around others who smoke  Nicotine and other chemicals in cigarettes and cigars damages the cilia that move mucus out of your lungs  Ask your healthcare provider for information if you currently smoke and need help to quit  E-cigarettes or smokeless tobacco still contain nicotine  Talk to your healthcare provider before you use these products  · Drink liquids as directed  Liquids help keep your air passages moist and help you cough up mucus   You may need to drink more liquids when you have acute bronchitis  Ask how much liquid to drink each day and which liquids are best for you  · Use a humidifier or vaporizer  Use a cool mist humidifier or a vaporizer to increase air moisture in your home  This may make it easier for you to breathe and help decrease your cough  Decrease risk for acute bronchitis:   · Get the vaccinations you need  Ask your healthcare provider if you should get vaccinated against the flu or pneumonia  · Prevent the spread of germs  You can decrease your risk of acute bronchitis and other illnesses by doing the following:     [facility] [place] [facility] your hands often with soap and water  Carry germ-killing hand lotion or gel with you  You can use the lotion or gel to clean your hands when soap and water are not available  ¨ Do not touch your eyes, nose, or mouth unless you have washed your hands first     ¨ Always cover your mouth when you cough to prevent the spread of germs  It is best to cough into a tissue or your shirt sleeve instead of into your hand  Ask those around you cover their mouths when they cough  ¨ Try to avoid people who have a cold or the flu  If you are sick, stay away from others as much as possible  Medicines: Your healthcare provider may  give you any of the following:  · Ibuprofen or acetaminophen  are medicines that help lower your fever  They are available without a doctor's order  Ask your healthcare provider which medicine is right for you  Ask how much to take and how often to take it  Follow directions  These medicines can cause stomach bleeding if not taken correctly  Ibuprofen can cause kidney damage  Do not take ibuprofen if you have kidney disease, an ulcer, or allergies to aspirin  Acetaminophen can cause liver damage  Do not take more than 4,000 milligrams in 24 hours  · Decongestants  help loosen mucus in your lungs and make it easier to cough up  This can help you breathe easier  · Cough suppressants  decrease your urge to cough   If your cough produces mucus, do not take a cough suppressant unless your healthcare provider tells you to  Your healthcare provider may suggest that you take a cough suppressant at night so you can rest     · Inhalers  may be given  Your healthcare provider may give you one or more inhalers to help you breathe easier and cough less  An inhaler gives your medicine to open your airways  Ask your healthcare provider to show you how to use your inhaler correctly  · Take your medicine as directed  Contact your healthcare provider if you think your medicine is not helping or if you have side effects  Tell him of her if you are allergic to any medicine  Keep a list of the medicines, vitamins, and herbs you take  Include the amounts, and when and why you take them  Bring the list or the pill bottles to follow-up visits  Carry your medicine list with you in case of an emergency  Follow up with your healthcare provider as directed:  Write down questions you have so you will remember to ask them during your follow-up visits  © 2017 [address] Information is for End User's use only and may not be sold, redistributed or otherwise used for commercial purposes  All illustrations and images included in CareNotes® are the copyrighted property of [employer]  or [employer]  The above information is an  only  It is not intended as medical advice for individual conditions or treatments  Talk to your doctor, nurse or pharmacist before following any medical regimen to see if it is safe and effective for you  Bronchospasm   WHAT YOU NEED TO KNOW:   Bronchospasm is a narrowing of the airway that usually comes and goes  You may be at risk for bronchospasm if you have a chest cold or allergies  You may also be at risk if you are bothered by air pollution, certain medicines, cold, dry air, smoke, or strong odors  Exercise may worsen your symptoms   Bronchospasms may make it hard for you to breathe  DISCHARGE INSTRUCTIONS:   Medicines: You may need any of the following:  · Bronchodilators  help expand your airway for easier breathing  Some of these medicines may help prevent future spasms  · Inhaled steroids  help reduce swelling in your airway and soothe your breathing  These are used for long-term control  · Anticholinergics  help relax and open your airway  · Take your medicine as directed  Contact your healthcare provider if you think your medicine is not helping or if you have side effects  Tell him of her if you are allergic to any medicine  Keep a list of the medicines, vitamins, and herbs you take  Include the amounts, and when and why you take them  Bring the list or the pill bottles to follow-up visits  Carry your medicine list with you in case of an emergency  Follow up with your healthcare provider as directed: You may need more tests to find the cause of your condition  Write down your questions so you remember to ask them during your visits  Self-care:   · Avoid triggers  · Warm up before you exercise  Ask your healthcare provider about the best exercise plan for you  · Try to avoid people who are sick  Ask your healthcare provider if you need a flu or pneumonia vaccine  · Breathe through your nose when you are in cold, dry air or weather  This may help reduce lung irritation by warming the air before it reaches your lungs  Contact your healthcare provider if:   · You have a fever  · You have a cough that will not go away  · Your wheezing worsens  · You have questions or concerns about your condition or care  Return to the emergency department if:   · You cough or spit up blood  · You have trouble breathing  · You have blue fingernails or toenails  · You have chest pain  · You have a fast or uneven heartbeat    © 2017 [employer] Information is for End User's use only and may not be sold, redistributed or otherwise used for commercial purposes  All illustrations and images included in CareNotes® are the copyrighted property of [employer]  or [employer]  The above information is an  only  It is not intended as medical advice for individual conditions or treatments  Talk to your doctor, nurse or pharmacist before following any medical regimen to see if it is safe and effective for you

## 2019-02-09 NOTE — ED PROVIDER NOTES
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 Taking?    ALENDRONATE SODIUM PO   Yes No   Sig: Take 1 tablet by mouth once a week   Calcium Carbonate (CALCIUM 600 PO)   Yes No   Sig: Take 1 capsule by mouth 2 (two) times a day   DULoxetine (CYMBALTA) 60 mg delayed release capsule  Self Yes No   Sig: Take 60 mg by mouth daily   Linagliptin (TRADJENTA) 5 MG TABS  Self Yes No   Sig: Take 5 mg by mouth daily   Magnesium 500 MG CAPS   Yes No   Sig: Take 1 capsule by mouth 3 (three) times a day   Multiple Vitamin (MULTIVITAMIN) capsule   Yes No   Sig: Take 1 capsule by mouth daily   amLODIPine (NORVASC) 2 5 mg tablet   No No   Sig: Take 1 tablet (2 5 mg total) by mouth daily   b complex vitamins tablet   Yes No   Sig: Take 1 tablet by mouth 2 (two) times a day   cyclobenzaprine (FLEXERIL) 10 mg tablet  Self Yes No   Sig: Take 10 mg by mouth daily   diclofenac sodium (VOLTAREN) 1 %   Yes No   Sig: Apply 2 g topically as needed   ezetimibe (ZETIA) 10 mg tablet   Yes No   fenofibrate (TRICOR) 145 mg tablet   Yes No   Sig: Take 145 mg by mouth daily   glimepiride (AMARYL) 2 mg tablet Self Yes No   Sig: Take 2 mg by mouth 2 (two) times a day   insulin detemir (LEVEMIR) 100 units/mL subcutaneous injection  Self Yes No   Sig: Inject 25 Units under the skin daily at bedtime   levothyroxine 175 mcg tablet   Yes No   Sig: Take 175 mcg by mouth daily   losartan (COZAAR) 50 mg tablet   Yes No   metoprolol tartrate (LOPRESSOR) 50 mg tablet  Other (Specify) Yes No   Sig: Take 50 mg by mouth every 12 (twelve) hours     omega-3-acid ethyl esters (LOVAZA) 1 g capsule   Yes No   omeprazole (PriLOSEC) 40 MG capsule   Yes No   pramipexole (MIRAPEX) 0 25 mg tablet   Yes No   Sig: Take 0 25 mg by mouth daily   rosuvastatin (CRESTOR) 5 mg tablet  Self Yes No   Sig: Take 5 mg by mouth daily   triamterene-hydrochlorothiazide (DYAZIDE) 37 5-25 mg per capsule  Self Yes No   Sig: Take 1 capsule by mouth every morning      Facility-Administered Medications: None       Past Medical History:   Diagnosis Date    Anxiety     Arthritis     Diabetes mellitus ([facility] )     Family history of thyroid problem     Heart burn     Hyperplasia, parathyroid ([facility] )     Hypertension     Kidney problem     Seasonal allergies     Sleep apnea     Swollen ankles        Past Surgical History:   Procedure Laterality Date    ARTHROSCOPY KNEE      BREAST BIOPSY      HIATAL HERNIA REPAIR      LIPOSUCTION      REDUCTION MAMMAPLASTY      SQUAMOUS CELL CARCINOMA EXCISION      TONSILLECTOMY      TUBAL LIGATION         Family History   Problem Relation Age of Onset    Heart disease Mother     Heart disease Father     Cancer Father         lung cancer     I have reviewed and agree with the history as documented  Social History   Substance Use Topics    Smoking status: Former Smoker     Packs/day: 2 00     Years: 10 00     Types: Cigarettes     Quit date: 1976    Smokeless tobacco: Never Used      Comment: quit in '76    Alcohol use Yes      Comment: rare        Review of Systems   Constitutional: Negative for fever  Respiratory: Positive for cough, shortness of breath and wheezing  Cardiovascular: Negative for chest pain  Gastrointestinal: Negative for nausea and vomiting  All other systems reviewed and are negative  Physical Exam  Physical Exam   Constitutional: She is oriented to person, place, and time  She appears well-developed and well-nourished  She appears distressed (moderate)  HENT:   Head: Normocephalic and atraumatic  Mucous membranes somewhat moist     Eyes: No scleral icterus  Neck: Normal range of motion  Neck supple  No JVD present  No tracheal deviation present  Cardiovascular: Regular rhythm and normal heart sounds  No murmur heard  Tachycardia  Pulmonary/Chest: No stridor  No respiratory distress  She has wheezes (occasional)  She has no rales  Scattered rhonci  Abdominal: Soft  Bowel sounds are normal  There is no tenderness  Musculoskeletal: She exhibits no edema or deformity  Neurological: She is alert and oriented to person, place, and time  Skin: Skin is warm and dry  No rash noted  R foot dressing C/D/I  Psychiatric: She has a normal mood and affect  Nursing note and vitals reviewed        Vital Signs  ED Triage Vitals [02/08/19 2331]   Temperature Pulse Respirations Blood Pressure SpO2   98 9 °F (37 2 °C) (!) 108 18 (!) 185/82 95 %      Temp Source Heart Rate Source Patient Position - Orthostatic VS BP Location FiO2 (%)   Oral Monitor Sitting Right arm --      Pain Score       No Pain           Vitals:    02/08/19 2331   BP: (!) 185/82   Pulse: (!) 108   Patient Position - Orthostatic VS: Sitting       Visual Acuity      ED Medications  Medications   predniSONE tablet 60 mg (not administered)   albuterol (PROVENTIL HFA,VENTOLIN HFA) inhaler 2 puff (not administered)   ceftriaxone (ROCEPHIN) 1 g/50 mL in dextrose IVPB (1,000 mg Intravenous New Bag 2/9/19 0000)   azithromycin (ZITHROMAX) tablet 500 mg (500 mg Oral Given 2/9/19 0000)   albuterol inhalation solution 5 mg (5 mg Nebulization Given 2/9/19 0001)       Diagnostic Studies  Results Reviewed     Procedure Component Value Units Date/Time    B-type natriuretic peptide [[id number]]  (Abnormal) Collected:  02/08/19 2349    Lab Status:  Final result Specimen:  Blood from Arm, Left Updated:  02/09/19 0022     NT-proBNP 151 (H) pg/mL     Lactic acid, plasma [[id number]]  (Normal) Collected:  02/08/19 2349    Lab Status:  Final result Specimen:  Blood from Arm, Left Updated:  02/09/19 0018     LACTIC ACID 1 0 mmol/L     Narrative:         Result may be elevated if tourniquet was used during collection  Troponin I [[id number]]  (Normal) Collected:  02/08/19 2349    Lab Status:  Final result Specimen:  Blood from Arm, Left Updated:  02/09/19 0017     Troponin I <0 02 ng/mL     Comprehensive metabolic panel [[id number]]  (Abnormal) Collected:  02/08/19 2349    Lab Status:  Final result Specimen:  Blood from Arm, Left Updated:  02/09/19 0015     Sodium 135 (L) mmol/L      Potassium 3 9 mmol/L      Chloride 96 (L) mmol/L      CO2 28 mmol/L      ANION GAP 11 mmol/L      BUN 43 (H) mg/dL      Creatinine 1 83 (H) mg/dL      Glucose 152 (H) mg/dL      Calcium 9 3 mg/dL      AST 23 U/L      ALT 32 U/L      Alkaline Phosphatase 49 U/L      Total Protein 6 9 g/dL      Albumin 3 5 g/dL      Total Bilirubin 0 40 mg/dL      eGFR 28 ml/min/1 73sq m     Narrative:         National Kidney Disease Education Program recommendations are as follows:  GFR calculation is accurate only with a steady state creatinine  Chronic Kidney disease less than 60 ml/min/1 73 sq  meters  Kidney failure less than 15 ml/min/1 73 sq  meters      Melida Boron [[id number]]  (Normal) Collected:  02/08/19 2349    Lab Status:  Final result Specimen:  Blood from Arm, Left Updated:  02/09/19 0013     Protime 13 7 seconds      INR 1 08    APTT [[id number]]  (Normal) Collected:  02/08/19 2349    Lab Status:  Final result Specimen:  Blood from Arm, Left Updated:  02/09/19 0013     PTT 28 seconds     Blood gas, venous [[id number]] Collected:  02/08/19 2349    Lab Status:  Final result Specimen:  Blood from Arm, Left Updated:  02/08/19 2359     pH, Tato 7 388     pCO2, Tato 47 3 mm Hg      pO2, Tato 36 2 mm Hg      HCO3, Tato 27 9 mmol/L      Base Excess, Tato 2 3 mmol/L      O2 Content, Tato 11 6 ml/dL      O2 HGB, VENOUS 68 0 %     CBC and differential [[id number]]  (Abnormal) Collected:  02/08/19 2349    Lab Status:  Final result Specimen:  Blood from Arm, Left Updated:  02/08/19 2358     WBC 11 00 (H) Thousand/uL      RBC 4 22 Million/uL      Hemoglobin 12 0 g/dL      Hematocrit 36 5 %      MCV 87 fL      MCH 28 4 pg      MCHC 32 9 g/dL      RDW 14 6 %      MPV 11 6 fL      Platelets 557 Thousands/uL      nRBC 0 /100 WBCs      Neutrophils Relative 76 (H) %      Immat GRANS % 0 %      Lymphocytes Relative 17 %      Monocytes Relative 4 %      Eosinophils Relative 3 %      Basophils Relative 0 %      Neutrophils Absolute 8 38 (H) Thousands/µL      Immature Grans Absolute 0 03 Thousand/uL      Lymphocytes Absolute 1 88 Thousands/µL      Monocytes Absolute 0 42 Thousand/µL      Eosinophils Absolute 0 28 Thousand/µL      Basophils Absolute 0 01 Thousands/µL     Blood culture #1 [[id number]] Collected:  02/08/19 2349    Lab Status: In process Specimen:  Blood from Arm, Left Updated:  02/08/19 2355    Blood culture #2 [[id number]] Collected:  02/08/19 2351    Lab Status: In process Specimen:  Blood from Arm, Right Updated:  02/08/19 2355                 XR chest 2 views   ED Interpretation by Eliecer Andrews MD (02/09 0032)   Elevated R hemidiaphragm read by me                    Procedures  ECG 12 Lead Documentation  Date/Time: 2/8/2019 11:41 PM  Performed by: Roxana Lu  Authorized by: Roxana Lu     Indications / Diagnosis:  Sob  ECG reviewed by me, the ED Provider: yes    Patient location:  ED  Previous ECG:     Previous ECG:  Unavailable  Rate:     ECG rate:  108    ECG rate assessment: tachycardic    Rhythm:     Rhythm: sinus tachycardia    Ectopy:     Ectopy: none    QRS:     QRS axis:  Normal  Conduction:     Conduction: normal    ST segments:     ST segments:  Normal  T waves:     T waves: normal    Comments:      I do not agree with computer reading of cannot rule out anterior infarct, age undetermined  Phone Contacts  ED Phone Contact    ED Course  ED Course as of Feb 09 0038   Sat Feb 09, 2019   0032 Labs, CXR, EKG d/w patient and  with patient's permission  I doubt ACS or MI  Pulse ox=95% RA indicating adequate oxygenation  No wheezing prior to discharge  Will start short course of prednisone for bronchospasm  HEART Risk Score      Most Recent Value   History  0 Filed at: 02/09/2019 0023   ECG  0 Filed at: 02/09/2019 0023   Age  2 Filed at: 02/09/2019 0023   Risk Factors  1 Filed at: 02/09/2019 0023   Troponin  0 Filed at: 02/09/2019 0023   Heart Score Risk Calculator   History  0 Filed at: 02/09/2019 0023   ECG  0 Filed at: 02/09/2019 0023   Age  2 Filed at: 02/09/2019 0023   Risk Factors  1 Filed at: 02/09/2019 0023   Troponin  0 Filed at: 02/09/2019 0023   HEART Score  3 Filed at: 02/09/2019 0023   HEART Score  3 Filed at: 02/09/2019 0023                      Initial Sepsis Screening     Row Name 02/08/19 1945                Is the patient's history suggestive of a new or worsening infection? (!)  Yes (Proceed)  -AO        Suspected source of infection pneumonia  -AO        Are two or more of the following signs & symptoms of infection both present and new to the patient?  No  -AO        Indicate SIRS criteria Tachycardia > 90 bpm  -AO        If the answer is yes to both questions, suspicion of sepsis is present          If severe sepsis is present AND tissue hypoperfusion perists in the hour after fluid resuscitation or lactate > 4, the patient meets criteria for SEPTIC SHOCK          Are any of the following organ dysfunction criteria present within 6 hours of suspected infection and SIRS criteria that are NOT considered to be chronic conditions?         Organ dysfunction          Date of presentation of severe sepsis          Time of presentation of severe sepsis          Tissue hypoperfusion persists in the hour after crystalloid fluid administration, evidenced, by either:          Was hypotension present within one hour of the conclusion of crystalloid fluid administration?         Date of presentation of septic shock          Time of presentation of septic shock            User Key  (r) = Recorded By, (t) = Taken By, (c) = Cosigned By    [address] Name Provider Marisol Pemberton MD Physician                  MDM  Number of Diagnoses or Management Options  Diagnosis management comments: DDX including but not limited to: pneumonia, pleural effusion, CHF, doubt PE, PTX, ACS, MI, asthma exacerbation, COPD exacerbation, anemia, metabolic abnormality, renal failure  Amount and/or Complexity of Data Reviewed  Clinical lab tests: ordered and reviewed  Tests in the radiology section of CPT®: ordered and reviewed  Decide to obtain previous medical records or to obtain history from someone other than the patient: yes  Independent visualization of images, tracings, or specimens: yes        Disposition  Final diagnoses:   Bronchitis with bronchospasm     Time reflects when diagnosis was documented in both MDM as applicable and the Disposition within this note     Time User Action Codes Description Comment    2/9/2019 12:36 AM Leanne Mccoy Add [J20 9] Bronchitis with bronchospasm       ED Disposition     ED Disposition Condition Date/Time Comment    Discharge  Sat Feb 9, 2019 12:36 AM Channingel Dates discharge to home/self care      Condition at discharge: Stable        Follow-up Information     Follow up With Specialties Details Why [address], DO Family Medicine Call in 3 days Can use inhaler 1-2 puffs every 4 hours as needed for difficulty breathing  Return sooner if increased difficulty breathing, pain, fever, rash, vomiting  [address]            Patient's Medications   Discharge Prescriptions    AZITHROMYCIN (ZITHROMAX) 250 MG TABLET    Take 1 tablet (250 mg total) by mouth daily for 4 days       Start Date: 2/10/2019 End Date: 2/14/2019       Order Dose: 250 mg       Quantity: 4 tablet    Refills: 0    PREDNISONE 20 MG TABLET    Take 1 tablet (20 mg total) by mouth daily for 4 days Take 3 tabs daily for 4 days       Start Date: 2/10/2019 End Date: 2/14/2019       Order Dose: 20 mg       Quantity: 12 tablet    Refills: 0     No discharge procedures on file      ED Provider  Electronically Signed by           Brittney Casper MD  02/09/19 5782

## 2019-02-11 ENCOUNTER — OFFICE VISIT (OUTPATIENT)
Dept: NEPHROLOGY | Facility: CLINIC | Age: 69
End: 2019-02-11
Payer: MEDICARE

## 2019-02-11 VITALS — HEIGHT: 63 IN | BODY MASS INDEX: 28.39 KG/M2 | WEIGHT: 160.2 LBS

## 2019-02-11 DIAGNOSIS — I12.9 HYPERTENSIVE CHRONIC KIDNEY DISEASE WITH STAGE 1 THROUGH STAGE 4 CHRONIC KIDNEY DISEASE, OR UNSPECIFIED CHRONIC KIDNEY DISEASE: Primary | ICD-10-CM

## 2019-02-11 DIAGNOSIS — I12.9 HYPERTENSIVE CHRONIC KIDNEY DISEASE WITH STAGE 1 THROUGH STAGE 4 CHRONIC KIDNEY DISEASE, OR UNSPECIFIED CHRONIC KIDNEY DISEASE: ICD-10-CM

## 2019-02-11 DIAGNOSIS — N18.30 CHRONIC KIDNEY DISEASE, STAGE III (MODERATE) (HCC): ICD-10-CM

## 2019-02-11 DIAGNOSIS — E55.9 VITAMIN D DEFICIENCY: ICD-10-CM

## 2019-02-11 LAB
ATRIAL RATE: 108 BPM
P AXIS: 50 DEGREES
PR INTERVAL: 190 MS
QRS AXIS: -6 DEGREES
QRSD INTERVAL: 82 MS
QT INTERVAL: 302 MS
QTC INTERVAL: 404 MS
T WAVE AXIS: 82 DEGREES
VENTRICULAR RATE: 108 BPM

## 2019-02-11 PROCEDURE — 99214 OFFICE O/P EST MOD 30 MIN: CPT | Performed by: INTERNAL MEDICINE

## 2019-02-11 PROCEDURE — 93010 ELECTROCARDIOGRAM REPORT: CPT | Performed by: INTERNAL MEDICINE

## 2019-02-11 RX ORDER — ERGOCALCIFEROL 1.25 MG/1
50000 CAPSULE ORAL WEEKLY
Qty: 12 CAPSULE | Refills: 0 | Status: SHIPPED | OUTPATIENT
Start: 2019-02-11 | End: 2019-04-06 | Stop reason: SDUPTHER

## 2019-02-11 RX ORDER — ERGOCALCIFEROL 1.25 MG/1
50000 CAPSULE ORAL WEEKLY
Qty: 12 CAPSULE | Refills: 0 | Status: SHIPPED | OUTPATIENT
Start: 2019-02-11 | End: 2019-02-11 | Stop reason: SDUPTHER

## 2019-02-11 NOTE — PATIENT INSTRUCTIONS
1  Medication change today:  -please begin ergo calciferol 98962 units weekly for 12 weeks  This is a type of vitamin-D   -please take vitamin-D 2000 units over the counter on a daily basis    2  Please go for nonfasting lab work in the next couple of weeks any time of the day    3  Follow-up in 4 months:  -please bring in 1 week a blood pressure readings morning and evening, sitting and standing on right arm only:  · Take the morning readings before any medications  · Take the evening readings closer to bedtime  · When taking standing readings, keep your arm supported at heart level and not dangling  -please go for lab work nonfasting but in the morning    4  General instructions:  -avoid salt  -avoid medications such as Motrin, Naprosyn, ibuprofen, Aleve or Advil or Celebrex as they can affect your kidney function; you can use Tylenol as needed for pain or fevers if you have no liver problems  -avoid medications such as Sudafed or other medications with decongestants as they can raise your blood pressure  -try to exercise at least 30 minutes at least 3 days a week with an ultimate goal of 5 days a week  -try to lose 5-10 lb by your next visit

## 2019-02-11 NOTE — LETTER
February 11, 2019     Angeli Naranjo, [address] [address]    Patient: Radha Claros   YOB: 1950   Date of Visit: 2/11/2019       Dear Dr Champ Middleton:    Thank you for referring Radha Claros to me for evaluation  Below are my notes for this consultation  If you have questions, please do not hesitate to call me  I look forward to following your patient along with you  Sincerely,        Ferdinand Yan MD        CC: MD Vidal Nam MD Augusto Alamin, MD  2/11/2019  9:16 AM  Sign at close encounter  RENAL FOLLOW UP NOTE: td    ASSESSMENT AND PLAN:  1   CKD stage 3 :  · Etiology:  Diabetic nephropathy/hypertensive nephrosclerosis/arteriolar nephrosclerosis/chronic NSAID use  No evidence of obstructive uropathy, renal artery disease or primary glomerular disease with a bland urinalysis  Of note, CPK was normal at 105 no evidence rhabdomyolysis  · Baseline creatinine:  1 5-2 0  · Current creatinine:  2 03 at baseline  · Urine protein creatinine ratio:  0 16 g at goal  Recommendations:  · Treat hypertension-please see below  · Treat dyslipidemia-please see below  · Maintain proteinuria less than 1 g or as low as possible  · Avoid nephrotoxic agents such as NSAIDs, patient counseled as such  2   Volume:  Euvolemic  3  Hypertension:  Workup:  -saline suppression test for primary aldosterone state was normal  -plasma free metanephrines was negative  -RENAL ARTERY DUPLEX NOT DONE       Current blood pressure averages:  AM:  128/73, standing 128/74  PM:  127/70, standing 119/71  Heart rate 60-70    · Goal blood pressure:  120-125/less than 80  Recommendations:  ·  No changes today as she is close to goal   Once she is able to exercise she will pursue exercise, low-salt diet and weight loss  For now do not really had any diuretic  4   Electrolytes:  Essentially normal, sodium 135 but corrected for glucose approximately 136  5    Mineral bone disorder:  · Calcium/magnesium/phosphorus:  Calcium was normal, phosphorus magnesium not done  · PTH intact:  11 7  · Vitamin-D:  Was 20:  Ergo calciferol/vitamin-D over-the-counter  6  Dyslipidemia:  · Goal LDL:  Less than 100  · Current lipid profile:  LDL unobtainable, triglycerides are 382, HDL 27  Recommendations:  Per Endocrinology  7  Anemia:  Hemoglobin acceptable 12 3  8  Other problems:  · Depression  · Diabetes mellitus per primary medical physician  · Hypothyroidism  · BARBARA on CPAP  · Fibromyalgia/osteoarthritis  · Nephrolithiasis  · Basal cell/squamous CA of the skin  · Urinary stress incontinence  · Current          PATIENT INSTRUCTIONS:    Patient Instructions   1  Medication change today:  -please begin ergo calciferol 48553 units weekly for 12 weeks  This is a type of vitamin-D   -please take vitamin-D 2000 units over the counter on a daily basis    2  Please go for nonfasting lab work in the next couple of weeks any time of the day    3  Follow-up in 4 months:  -please bring in 1 week a blood pressure readings morning and evening, sitting and standing on right arm only:  · Take the morning readings before any medications  · Take the evening readings closer to bedtime  · When taking standing readings, keep your arm supported at heart level and not dangling  -please go for lab work nonfasting but in the morning    4  General instructions:  -avoid salt  -avoid medications such as Motrin, Naprosyn, ibuprofen, Aleve or Advil or Celebrex as they can affect your kidney function; you can use Tylenol as needed for pain or fevers if you have no liver problems  -avoid medications such as Sudafed or other medications with decongestants as they can raise your blood pressure  -try to exercise at least 30 minutes at least 3 days a week with an ultimate goal of 5 days a week  -try to lose 5-10 lb by your next visit            Subjective:   She had a spur removed from her right great toe a few days ago    Now she has bronchitis with some brown colored sputum, no fevers or chills  Placed on Zithromax and prednisone by emergency room physician  Overall improving  Patient noted a decrease in appetite on new diabetic medication Trulicity  Energy is reasonable  Patient notes foamy urine unchanged  No dysuria or gross hematuria  No GI symptoms, except for GERD  No chest pain, mild shortness of breath with her cough, very minimal lower extremity edema, and comes and goes  No headaches, slight amount of lightheadedness with standing up quickly  Blood pressure medications:  -lopressor 50 mg twice a day  -losartan 50 mg once a day in the a m   -amlodipine 2 5 mg daily in the morning    ROS:  See HPI, otherwise review of systems as completely reviewed with the patient are negative    Past Medical History:   Diagnosis Date    Anxiety     Arthritis     [facility] [address] [facility]     Family history of thyroid problem     Heart burn     Hyperplasia, parathyroid ([facility] [address] [facility]     Hypertension     Kidney problem     Seasonal allergies     Sleep apnea     Swollen ankles      Past Surgical History:   Procedure Laterality Date    ARTHROSCOPY KNEE      BREAST BIOPSY      HIATAL HERNIA REPAIR      LIPOSUCTION      REDUCTION MAMMAPLASTY      SQUAMOUS CELL CARCINOMA EXCISION      TOE SURGERY      TONSILLECTOMY      TUBAL LIGATION       Family History   Problem Relation Age of Onset    Heart disease Mother     Heart disease Father     Cancer Father         lung cancer      reports that she quit smoking about 43 years ago  Her smoking use included cigarettes  She has a 20 00 pack-year smoking history  She has never used smokeless tobacco  She reports that she drinks alcohol  She reports that she does not use drugs      I COMPLETELY REVIEWED THE PAST MEDICAL HISTORY/PAST SURGICAL HISTORY/SOCIAL HISTORY/FAMILY HISTORY/AND MEDICATIONS  AND UPDATED ALL    Objective:     Vitals:   BP sitting on right:  138/80 with a heart rate of 68 and regular  BP standing on right:  128/70 with a heart rate of 60 and regular    Weight (last 2 days)     Date/Time   Weight    02/11/19 0838   72 7 (160 2)            Wt Readings from Last 3 Encounters:   02/11/19 72 7 kg (160 lb 3 2 oz)   02/08/19 72 6 kg (160 lb)   11/27/18 73 kg (161 lb)     Body mass index is 28 38 kg/m²      Physical Exam: General:  No acute distress  Skin:  No acute rash  Eyes:  No scleral icterus, noninjected  ENT:  Moist mucous membranes  Neck:  Supple, no jugular venous distention  Back   No CVAT  Chest:  Clear to auscultation and percussion, good respiratory effort  CVS:  Regular rate and rhythm without a rub, murmurs or gallops  Abdomen:  Mildly obese, Soft and nontender with normal bowel sounds  Extremities:  No cyanosis and no edema  Neuro:  Grossly intact  Psych:  Alert, oriented x3 and appropriate      Medications:    Current Outpatient Medications:     amLODIPine (NORVASC) 2 5 mg tablet, Take 1 tablet (2 5 mg total) by mouth daily, Disp: 30 tablet, Rfl: 2    azithromycin (ZITHROMAX) 250 mg tablet, Take 1 tablet (250 mg total) by mouth daily for 4 days, Disp: 4 tablet, Rfl: 0    b complex vitamins tablet, Take 1 tablet by mouth 2 (two) times a day, Disp: , Rfl:     Calcium Carbonate (CALCIUM 600 PO), Take 1 capsule by mouth 2 (two) times a day, Disp: , Rfl:     cyclobenzaprine (FLEXERIL) 10 mg tablet, Take 10 mg by mouth daily, Disp: , Rfl:     diclofenac sodium (VOLTAREN) 1 %, Apply 2 g topically as needed, Disp: , Rfl:     Dulaglutide (TRULICITY) 5 79 QY/7 7CA SOPN, Inject 0 75 mg under the skin once a week, Disp: , Rfl:     DULoxetine (CYMBALTA) 60 mg delayed release capsule, Take 60 mg by mouth daily, Disp: , Rfl:     ezetimibe (ZETIA) 10 mg tablet, Take 10 mg by mouth daily , Disp: , Rfl:     fenofibrate (TRICOR) 145 mg tablet, Take 145 mg by mouth daily, Disp: , Rfl:     glimepiride (AMARYL) 2 mg tablet, Take 2 mg by mouth 2 (two) times a day, Disp: , Rfl:     insulin detemir (LEVEMIR) 100 units/mL subcutaneous injection, Inject 25 Units under the skin daily at bedtime, Disp: , Rfl:     levothyroxine 175 mcg tablet, Take 175 mcg by mouth daily, Disp: , Rfl:     Linagliptin (TRADJENTA) 5 MG TABS, Take 5 mg by mouth daily, Disp: , Rfl:     losartan (COZAAR) 50 mg tablet, , Disp: , Rfl:     Magnesium 500 MG CAPS, Take 1 capsule by mouth 3 (three) times a day, Disp: , Rfl:     metoprolol tartrate (LOPRESSOR) 50 mg tablet, Take 50 mg by mouth every 12 (twelve) hours  , Disp: , Rfl:     Multiple Vitamin (MULTIVITAMIN) capsule, Take 1 capsule by mouth daily, Disp: , Rfl:     omega-3-acid ethyl esters (LOVAZA) 1 g capsule, , Disp: , Rfl:     omeprazole (PriLOSEC) 40 MG capsule, , Disp: , Rfl:     pramipexole (MIRAPEX) 0 25 mg tablet, Take 0 25 mg by mouth daily, Disp: , Rfl:     predniSONE 20 mg tablet, Take 1 tablet (20 mg total) by mouth daily for 4 days Take 3 tabs daily for 4 days, Disp: 12 tablet, Rfl: 0    rosuvastatin (CRESTOR) 5 mg tablet, Take 5 mg by mouth daily, Disp: , Rfl:     ALENDRONATE SODIUM PO, Take 1 tablet by mouth once a week, Disp: , Rfl:     ergocalciferol (VITAMIN D2) 50,000 units, Take 1 capsule (50,000 Units total) by mouth once a week for 12 doses, Disp: 12 capsule, Rfl: 0    triamterene-hydrochlorothiazide (DYAZIDE) 37 5-25 mg per capsule, Take 1 capsule by mouth every morning, Disp: , Rfl:     Lab, Imaging and other studies: I have personally reviewed pertinent labs  Laboratory Results:  Results for orders placed or performed during the hospital encounter of 02/08/19   Blood culture #1   Result Value Ref Range    Blood Culture No Growth at 48 hrs  Blood culture #2   Result Value Ref Range    Blood Culture No Growth at 48 hrs      CBC and differential   Result Value Ref Range    WBC 11 00 (H) 4 31 - 10 16 Thousand/uL    RBC 4 22 3 81 - 5 12 Million/uL    Hemoglobin 12 0 11 5 - 15 4 g/dL    Hematocrit 36 5 34 8 - 46 1 %    MCV 87 82 - 98 fL MCH 28 4 26 8 - 34 3 pg    MCHC 32 9 31 4 - 37 4 g/dL    RDW 14 6 11 6 - 15 1 %    MPV 11 6 8 9 - 12 7 fL    Platelets 583 074 - 189 Thousands/uL    nRBC 0 /100 WBCs    Neutrophils Relative 76 (H) 43 - 75 %    Immat GRANS % 0 0 - 2 %    Lymphocytes Relative 17 14 - 44 %    Monocytes Relative 4 4 - 12 %    Eosinophils Relative 3 0 - 6 %    Basophils Relative 0 0 - 1 %    Neutrophils Absolute 8 38 (H) 1 85 - 7 62 Thousands/µL    Immature Grans Absolute 0 03 0 00 - 0 20 Thousand/uL    Lymphocytes Absolute 1 88 0 60 - 4 47 Thousands/µL    Monocytes Absolute 0 42 0 17 - 1 22 Thousand/µL    Eosinophils Absolute 0 28 0 00 - 0 61 Thousand/µL    Basophils Absolute 0 01 0 00 - 0 10 Thousands/µL   Protime-INR   Result Value Ref Range    Protime 13 7 11 8 - 14 2 seconds    INR 1 08 0 86 - 1 17   APTT   Result Value Ref Range    PTT 28 26 - 38 seconds   Comprehensive metabolic panel   Result Value Ref Range    Sodium 135 (L) 136 - 145 mmol/L    Potassium 3 9 3 5 - 5 3 mmol/L    Chloride 96 (L) 100 - 108 mmol/L    CO2 28 21 - 32 mmol/L    ANION GAP 11 4 - 13 mmol/L    BUN 43 (H) 5 - 25 mg/dL    Creatinine 1 83 (H) 0 60 - 1 30 mg/dL    Glucose 152 (H) 65 - 140 mg/dL    Calcium 9 3 8 3 - 10 1 mg/dL    AST 23 5 - 45 U/L    ALT 32 12 - 78 U/L    Alkaline Phosphatase 49 46 - 116 U/L    Total Protein 6 9 6 4 - 8 2 g/dL    Albumin 3 5 3 5 - 5 0 g/dL    Total Bilirubin 0 40 0 20 - 1 00 mg/dL    eGFR 28 ml/min/1 73sq m   Lactic acid, plasma   Result Value Ref Range    LACTIC ACID 1 0 0 5 - 2 0 mmol/L   Troponin I   Result Value Ref Range    Troponin I <0 02 <=0 04 ng/mL   B-type natriuretic peptide   Result Value Ref Range    NT-proBNP 151 (H) <125 pg/mL   Blood gas, venous   Result Value Ref Range    pH, Tato 7 388 7 300 - 7 400    pCO2, Tato 47 3 42 0 - 50 0 mm Hg    pO2, Tato 36 2 35 0 - 45 0 mm Hg    HCO3, Tato 27 9 24 - 30 mmol/L    Base Excess, Tato 2 3 mmol/L    O2 Content, Tato 11 6 ml/dL    O2 HGB, VENOUS 68 0 60 0 - 80 0 % Results from last 7 days   Lab Units 02/08/19  2349   WBC Thousand/uL 11 00*   HEMOGLOBIN g/dL 12 0   HEMATOCRIT % 36 5   PLATELETS Thousands/uL 270   POTASSIUM mmol/L 3 9   CHLORIDE mmol/L 96*   CO2 mmol/L 28   BUN mg/dL 43*   CREATININE mg/dL 1 83*   CALCIUM mg/dL 9 3         Radiology review:   chest X-ray    Ultrasound      Portions of the record may have been created with voice recognition software   Occasional wrong word or "sound a like" substitutions may have occurred due to the inherent limitations of voice recognition software   Read the chart carefully and recognize, using context, where substitutions have occurred

## 2019-02-14 LAB
BACTERIA BLD CULT: NORMAL
BACTERIA BLD CULT: NORMAL

## 2019-02-15 RX ORDER — ERGOCALCIFEROL 1.25 MG/1
50000 CAPSULE ORAL WEEKLY
Qty: 12 CAPSULE | Refills: 0 | Status: SHIPPED | OUTPATIENT
Start: 2019-02-15 | End: 2020-03-09

## 2019-04-06 ENCOUNTER — HOSPITAL ENCOUNTER (EMERGENCY)
Facility: HOSPITAL | Age: 69
Discharge: HOME | End: 2019-04-06
Attending: EMERGENCY MEDICINE | Admitting: EMERGENCY MEDICINE
Payer: MEDICARE

## 2019-04-06 ENCOUNTER — APPOINTMENT (EMERGENCY)
Dept: CT IMAGING | Facility: HOSPITAL | Age: 69
End: 2019-04-06
Payer: MEDICARE

## 2019-04-06 VITALS
SYSTOLIC BLOOD PRESSURE: 147 MMHG | OXYGEN SATURATION: 95 % | WEIGHT: 169.31 LBS | HEIGHT: 64 IN | BODY MASS INDEX: 28.91 KG/M2 | HEART RATE: 78 BPM | RESPIRATION RATE: 16 BRPM | DIASTOLIC BLOOD PRESSURE: 73 MMHG

## 2019-04-06 DIAGNOSIS — R51.9 LEFT TEMPORAL HEADACHE: Primary | ICD-10-CM

## 2019-04-06 LAB
ALBUMIN SERPL BCP-MCNC: 3.9 G/DL (ref 3.5–5)
ALP SERPL-CCNC: 75 U/L (ref 46–116)
ALT SERPL W P-5'-P-CCNC: 31 U/L (ref 12–78)
ANION GAP SERPL CALCULATED.3IONS-SCNC: 11 MMOL/L (ref 4–13)
AST SERPL W P-5'-P-CCNC: 29 U/L (ref 5–45)
BASOPHILS # BLD AUTO: 0.02 THOUSANDS/ΜL (ref 0–0.1)
BASOPHILS NFR BLD AUTO: 0 % (ref 0–1)
BILIRUB SERPL-MCNC: 0.2 MG/DL (ref 0.2–1)
BUN SERPL-MCNC: 42 MG/DL (ref 5–25)
CALCIUM SERPL-MCNC: 9.3 MG/DL (ref 8.3–10.1)
CHLORIDE SERPL-SCNC: 100 MMOL/L (ref 100–108)
CO2 SERPL-SCNC: 27 MMOL/L (ref 21–32)
CREAT SERPL-MCNC: 1.71 MG/DL (ref 0.6–1.3)
EOSINOPHIL # BLD AUTO: 0.31 THOUSAND/ΜL (ref 0–0.61)
EOSINOPHIL NFR BLD AUTO: 4 % (ref 0–6)
ERYTHROCYTE [DISTWIDTH] IN BLOOD BY AUTOMATED COUNT: 14.2 % (ref 11.6–15.1)
ERYTHROCYTE [SEDIMENTATION RATE] IN BLOOD: 18 MM/HOUR (ref 0–20)
GFR SERPL CREATININE-BSD FRML MDRD: 30 ML/MIN/1.73SQ M
GLUCOSE SERPL-MCNC: 232 MG/DL (ref 65–140)
GLUCOSE SERPL-MCNC: 256 MG/DL (ref 65–140)
HCT VFR BLD AUTO: 36.3 % (ref 34.8–46.1)
HGB BLD-MCNC: 11.9 G/DL (ref 11.5–15.4)
IMM GRANULOCYTES # BLD AUTO: 0.04 THOUSAND/UL (ref 0–0.2)
IMM GRANULOCYTES NFR BLD AUTO: 1 % (ref 0–2)
LYMPHOCYTES # BLD AUTO: 2.22 THOUSANDS/ΜL (ref 0.6–4.47)
LYMPHOCYTES NFR BLD AUTO: 28 % (ref 14–44)
MCH RBC QN AUTO: 28 PG (ref 26.8–34.3)
MCHC RBC AUTO-ENTMCNC: 32.8 G/DL (ref 31.4–37.4)
MCV RBC AUTO: 85 FL (ref 82–98)
MONOCYTES # BLD AUTO: 0.52 THOUSAND/ΜL (ref 0.17–1.22)
MONOCYTES NFR BLD AUTO: 7 % (ref 4–12)
NEUTROPHILS # BLD AUTO: 4.91 THOUSANDS/ΜL (ref 1.85–7.62)
NEUTS SEG NFR BLD AUTO: 60 % (ref 43–75)
NRBC BLD AUTO-RTO: 0 /100 WBCS
PLATELET # BLD AUTO: 328 THOUSANDS/UL (ref 149–390)
PMV BLD AUTO: 11.8 FL (ref 8.9–12.7)
POTASSIUM SERPL-SCNC: 3.7 MMOL/L (ref 3.5–5.3)
PROT SERPL-MCNC: 7.2 G/DL (ref 6.4–8.2)
RBC # BLD AUTO: 4.25 MILLION/UL (ref 3.81–5.12)
SODIUM SERPL-SCNC: 138 MMOL/L (ref 136–145)
WBC # BLD AUTO: 8.02 THOUSAND/UL (ref 4.31–10.16)

## 2019-04-06 PROCEDURE — 93005 ELECTROCARDIOGRAM TRACING: CPT

## 2019-04-06 PROCEDURE — 96361 HYDRATE IV INFUSION ADD-ON: CPT

## 2019-04-06 PROCEDURE — 85025 COMPLETE CBC W/AUTO DIFF WBC: CPT | Performed by: EMERGENCY MEDICINE

## 2019-04-06 PROCEDURE — 36415 COLL VENOUS BLD VENIPUNCTURE: CPT | Performed by: EMERGENCY MEDICINE

## 2019-04-06 PROCEDURE — 96375 TX/PRO/DX INJ NEW DRUG ADDON: CPT

## 2019-04-06 PROCEDURE — 99284 EMERGENCY DEPT VISIT MOD MDM: CPT

## 2019-04-06 PROCEDURE — 96374 THER/PROPH/DIAG INJ IV PUSH: CPT

## 2019-04-06 PROCEDURE — 85652 RBC SED RATE AUTOMATED: CPT | Performed by: EMERGENCY MEDICINE

## 2019-04-06 PROCEDURE — 70450 CT HEAD/BRAIN W/O DYE: CPT

## 2019-04-06 PROCEDURE — 80053 COMPREHEN METABOLIC PANEL: CPT | Performed by: EMERGENCY MEDICINE

## 2019-04-06 PROCEDURE — 99284 EMERGENCY DEPT VISIT MOD MDM: CPT | Performed by: EMERGENCY MEDICINE

## 2019-04-06 PROCEDURE — 82948 REAGENT STRIP/BLOOD GLUCOSE: CPT

## 2019-04-06 RX ORDER — DIPHENHYDRAMINE HYDROCHLORIDE 50 MG/ML
12.5 INJECTION INTRAMUSCULAR; INTRAVENOUS ONCE
Status: COMPLETED | OUTPATIENT
Start: 2019-04-06 | End: 2019-04-06

## 2019-04-06 RX ORDER — METOCLOPRAMIDE HYDROCHLORIDE 5 MG/ML
10 INJECTION INTRAMUSCULAR; INTRAVENOUS ONCE
Status: COMPLETED | OUTPATIENT
Start: 2019-04-06 | End: 2019-04-06

## 2019-04-06 RX ORDER — GABAPENTIN 300 MG/1
300 CAPSULE ORAL
COMMUNITY
End: 2020-05-14

## 2019-04-06 RX ORDER — ACETAMINOPHEN 325 MG/1
975 TABLET ORAL ONCE
Status: COMPLETED | OUTPATIENT
Start: 2019-04-06 | End: 2019-04-06

## 2019-04-06 RX ADMIN — METOCLOPRAMIDE 10 MG: 5 INJECTION, SOLUTION INTRAMUSCULAR; INTRAVENOUS at 20:33

## 2019-04-06 RX ADMIN — ACETAMINOPHEN 975 MG: 325 TABLET, FILM COATED ORAL at 20:26

## 2019-04-06 RX ADMIN — SODIUM CHLORIDE 1000 ML: 0.9 INJECTION, SOLUTION INTRAVENOUS at 20:30

## 2019-04-06 RX ADMIN — DIPHENHYDRAMINE HYDROCHLORIDE 12.5 MG: 50 INJECTION, SOLUTION INTRAMUSCULAR; INTRAVENOUS at 20:31

## 2019-04-07 LAB
ATRIAL RATE: 89 BPM
P AXIS: 58 DEGREES
PR INTERVAL: 210 MS
QRS AXIS: -10 DEGREES
QRSD INTERVAL: 88 MS
QT INTERVAL: 330 MS
QTC INTERVAL: 401 MS
T WAVE AXIS: 47 DEGREES
VENTRICULAR RATE: 89 BPM

## 2019-04-07 PROCEDURE — 93010 ELECTROCARDIOGRAM REPORT: CPT | Performed by: INTERNAL MEDICINE

## 2019-04-08 ENCOUNTER — OFFICE VISIT (OUTPATIENT)
Dept: PULMONOLOGY | Facility: CLINIC | Age: 69
End: 2019-04-08
Payer: MEDICARE

## 2019-04-08 VITALS
HEART RATE: 83 BPM | RESPIRATION RATE: 14 BRPM | HEIGHT: 64 IN | OXYGEN SATURATION: 96 % | BODY MASS INDEX: 27.31 KG/M2 | WEIGHT: 160 LBS | TEMPERATURE: 97.9 F | DIASTOLIC BLOOD PRESSURE: 80 MMHG | SYSTOLIC BLOOD PRESSURE: 150 MMHG

## 2019-04-08 DIAGNOSIS — R41.3 MEMORY IMPAIRMENT: ICD-10-CM

## 2019-04-08 DIAGNOSIS — R06.00 DYSPNEA ON EXERTION: ICD-10-CM

## 2019-04-08 DIAGNOSIS — G25.81 RLS (RESTLESS LEGS SYNDROME): ICD-10-CM

## 2019-04-08 DIAGNOSIS — Z99.89 OSA ON CPAP: ICD-10-CM

## 2019-04-08 DIAGNOSIS — I49.9 IRREGULAR HEART BEAT: ICD-10-CM

## 2019-04-08 DIAGNOSIS — G47.33 OSA ON CPAP: ICD-10-CM

## 2019-04-08 DIAGNOSIS — G47.33 OSA (OBSTRUCTIVE SLEEP APNEA): Primary | ICD-10-CM

## 2019-04-08 PROCEDURE — 99215 OFFICE O/P EST HI 40 MIN: CPT | Performed by: INTERNAL MEDICINE

## 2019-04-09 PROBLEM — G25.81 RLS (RESTLESS LEGS SYNDROME): Status: ACTIVE | Noted: 2019-04-09

## 2019-04-09 PROBLEM — G47.33 OSA ON CPAP: Status: ACTIVE | Noted: 2019-04-09

## 2019-04-09 PROBLEM — Z99.89 OSA ON CPAP: Status: ACTIVE | Noted: 2019-04-09

## 2019-05-07 ENCOUNTER — HOSPITAL ENCOUNTER (OUTPATIENT)
Dept: SLEEP CENTER | Facility: CLINIC | Age: 69
Discharge: HOME | End: 2019-05-07
Payer: MEDICARE

## 2019-05-07 DIAGNOSIS — G47.33 OSA (OBSTRUCTIVE SLEEP APNEA): ICD-10-CM

## 2019-05-07 PROCEDURE — 95811 POLYSOM 6/>YRS CPAP 4/> PARM: CPT | Performed by: INTERNAL MEDICINE

## 2019-05-07 PROCEDURE — 95811 POLYSOM 6/>YRS CPAP 4/> PARM: CPT

## 2019-05-12 DIAGNOSIS — G47.33 OSA ON CPAP: Primary | ICD-10-CM

## 2019-05-12 DIAGNOSIS — Z99.89 OSA ON CPAP: Primary | ICD-10-CM

## 2019-05-13 ENCOUNTER — TELEPHONE (OUTPATIENT)
Dept: PULMONOLOGY | Facility: CLINIC | Age: 69
End: 2019-05-13

## 2019-05-13 ENCOUNTER — TELEPHONE (OUTPATIENT)
Dept: SLEEP CENTER | Facility: CLINIC | Age: 69
End: 2019-05-13

## 2019-05-17 LAB
CREAT ?TM UR-SCNC: 74 UMOL/L
EXT MICROALBUMIN URINE RANDOM: 37.4
HBA1C MFR BLD HPLC: 8.5 %
MICROALBUMIN/CREAT UR: 505.4 MG/G{CREAT}

## 2019-05-20 ENCOUNTER — TELEPHONE (OUTPATIENT)
Dept: NEPHROLOGY | Facility: CLINIC | Age: 69
End: 2019-05-20

## 2019-05-20 DIAGNOSIS — R80.1 PERSISTENT PROTEINURIA: Primary | ICD-10-CM

## 2019-05-23 LAB
CREAT ?TM UR-SCNC: 45.5 UMOL/L
EXT PROTEIN URINE: 33.9
PROT/CREAT UR: 0.75 MG/G{CREAT}

## 2019-06-25 PROBLEM — R80.1 PERSISTENT PROTEINURIA: Status: ACTIVE | Noted: 2019-06-25

## 2019-06-27 ENCOUNTER — TELEPHONE (OUTPATIENT)
Dept: NEPHROLOGY | Facility: CLINIC | Age: 69
End: 2019-06-27

## 2019-06-27 ENCOUNTER — OFFICE VISIT (OUTPATIENT)
Dept: NEPHROLOGY | Facility: CLINIC | Age: 69
End: 2019-06-27
Payer: MEDICARE

## 2019-06-27 VITALS
DIASTOLIC BLOOD PRESSURE: 77 MMHG | HEIGHT: 64 IN | HEART RATE: 71 BPM | BODY MASS INDEX: 28.61 KG/M2 | SYSTOLIC BLOOD PRESSURE: 145 MMHG | WEIGHT: 167.6 LBS

## 2019-06-27 DIAGNOSIS — E11.21 DIABETIC NEPHROPATHY ASSOCIATED WITH TYPE 2 DIABETES MELLITUS (HCC): ICD-10-CM

## 2019-06-27 DIAGNOSIS — I12.9 HYPERTENSIVE CHRONIC KIDNEY DISEASE WITH STAGE 1 THROUGH STAGE 4 CHRONIC KIDNEY DISEASE, OR UNSPECIFIED CHRONIC KIDNEY DISEASE: Primary | ICD-10-CM

## 2019-06-27 DIAGNOSIS — R80.1 PERSISTENT PROTEINURIA: ICD-10-CM

## 2019-06-27 DIAGNOSIS — N18.30 CHRONIC KIDNEY DISEASE, STAGE III (MODERATE) (HCC): ICD-10-CM

## 2019-06-27 DIAGNOSIS — Z01.818 PREOPERATIVE CLEARANCE: ICD-10-CM

## 2019-06-27 PROCEDURE — 99215 OFFICE O/P EST HI 40 MIN: CPT | Performed by: INTERNAL MEDICINE

## 2019-06-27 RX ORDER — ICOSAPENT ETHYL 1000 MG/1
1 CAPSULE ORAL 2 TIMES DAILY
COMMUNITY
End: 2019-07-31 | Stop reason: ALTCHOICE

## 2019-06-27 RX ORDER — AMLODIPINE BESYLATE 2.5 MG/1
5 TABLET ORAL DAILY
Qty: 30 TABLET | Refills: 2 | Status: SHIPPED | OUTPATIENT
Start: 2019-06-27 | End: 2019-08-07 | Stop reason: SDUPTHER

## 2019-06-27 RX ORDER — CHLORTHALIDONE 25 MG/1
12.5 TABLET ORAL DAILY
Qty: 20 TABLET | Refills: 5 | Status: SHIPPED | OUTPATIENT
Start: 2019-06-27 | End: 2019-08-14

## 2019-06-27 RX ORDER — ERGOCALCIFEROL (VITAMIN D2) 50 MCG
2000 CAPSULE ORAL DAILY
COMMUNITY

## 2019-07-18 LAB
EXT GLUCOSE BLD: 146
EXTERNAL CALCIUM: 9.7
EXTERNAL CHLORIDE: 104
EXTERNAL CO2: 28
EXTERNAL CREATININE: 1.43
EXTERNAL EGFR: 37
EXTERNAL POTASSIUM: 4.2
EXTERNAL SODIUM: 139

## 2019-07-31 ENCOUNTER — OFFICE VISIT (OUTPATIENT)
Dept: PULMONOLOGY | Facility: CLINIC | Age: 69
End: 2019-07-31
Payer: MEDICARE

## 2019-07-31 VITALS
HEIGHT: 64 IN | DIASTOLIC BLOOD PRESSURE: 78 MMHG | BODY MASS INDEX: 28.71 KG/M2 | OXYGEN SATURATION: 96 % | TEMPERATURE: 97.9 F | WEIGHT: 168.2 LBS | HEART RATE: 70 BPM | SYSTOLIC BLOOD PRESSURE: 140 MMHG

## 2019-07-31 DIAGNOSIS — G25.81 RLS (RESTLESS LEGS SYNDROME): ICD-10-CM

## 2019-07-31 DIAGNOSIS — R06.00 DYSPNEA, UNSPECIFIED TYPE: ICD-10-CM

## 2019-07-31 DIAGNOSIS — I15.9 SECONDARY HYPERTENSION: ICD-10-CM

## 2019-07-31 DIAGNOSIS — G47.33 OSA ON CPAP: ICD-10-CM

## 2019-07-31 DIAGNOSIS — I27.20 MILD PULMONARY HYPERTENSION (HCC): ICD-10-CM

## 2019-07-31 DIAGNOSIS — I27.20 PULMONARY HYPERTENSION (HCC): ICD-10-CM

## 2019-07-31 DIAGNOSIS — Z99.89 OSA ON CPAP: ICD-10-CM

## 2019-07-31 PROBLEM — Z01.818 PREOPERATIVE CLEARANCE: Status: RESOLVED | Noted: 2019-06-27 | Resolved: 2019-07-31

## 2019-07-31 PROCEDURE — 99213 OFFICE O/P EST LOW 20 MIN: CPT | Performed by: INTERNAL MEDICINE

## 2019-07-31 RX ORDER — INSULIN ASPART 100 [IU]/ML
INJECTION, SOLUTION INTRAVENOUS; SUBCUTANEOUS
Refills: 0 | Status: ON HOLD | COMMUNITY
Start: 2019-07-08 | End: 2020-05-02

## 2019-07-31 RX ORDER — MULTIVIT WITH MINERALS/LUTEIN
2000 TABLET ORAL DAILY
COMMUNITY
End: 2020-05-05 | Stop reason: HOSPADM

## 2019-07-31 RX ORDER — PEN NEEDLE, DIABETIC 31 GX5/16"
NEEDLE, DISPOSABLE MISCELLANEOUS
Refills: 3 | COMMUNITY
Start: 2019-07-26

## 2019-07-31 RX ORDER — METHOCARBAMOL 500 MG/1
500 TABLET, FILM COATED ORAL
Refills: 2 | COMMUNITY
Start: 2019-07-09 | End: 2020-05-05 | Stop reason: HOSPADM

## 2019-07-31 NOTE — LETTER
July 31, 2019     Dominic Mcfaddens, [address] [place] [address]     Patient: Bryan Nolasco   YOB: 1950   Date of Visit: 7/31/2019       Dear Dr Miguel Pereira:    Thank you for referring Bryan Nolasco to me for evaluation  Below are my notes for this consultation  If you have questions, please do not hesitate to call me  I look forward to following your patient along with you  Sincerely,        Pat Luke DO        CC: No Recipients  Pat Luke DO  7/31/2019 11:47 PM  Sign at close encounter  Progress note - Pulmonary Medicine   Bryan Nolasco 78 y o  female MRN: [id number]       Impression & Plan:   78 y o  F with PMHx of DM, HTN, Hyperlipidemia and hiatal hernia s/p repair who comes in for follow up with her BARBARA s/p UPPP on CPAP and dyspnea on exertion     1   Mild BARBARA (AHI - 13 1) now on  autoPAP 9-20 without oxygen   Residual AHI of 4 9, mostly obstructive   She has good compliance   ABG without evidence of chronic hypercapnia  -    increase autoPAP to 10-20 cc of H2O pressure  -    Will renew supplies annually      -  She is aware of the risk of leaving sleep apnea untreated including hypertension, heart failure, arrhythmia, MI and stroke      2   RLS and significant neuropathy -  still present but improved on current regimen       -  Continue Neurontin 300mg PO at dinner and 600mg at bedtime  Consider increase to 900mg qHS if still persists  -  Continue Mirapex 0 25 qHS  Reduced due to compulsive gambling          3   Dyspnea with exertion -  in part obesity, deconditioning, diastolic HF and possible pulmonary hypertension   Spirometry is unremarkable  However, can not rule out asthma as clinical scenario seems like it could be involved   6 minute walk with borderline desaturation       -  Check full PFTs      -   Will trial 2 weeks of asmanex        -  Will also trial nasonex to help combat post nasal drip        5    Mild pulmonary hypertension/RV hypertrophy on echo -  The patient would like a second opinion and repeat evaluation of cardiac status  She prefers to have it performed at [facility]    ______________________________________________________________________    HPI:    Cole Martinez presents today for follow-up of for her BARBARA, PLMS and dyspnea  She state that she has been sleeping better with conversion to autoCPAP  She denies fatigue, dry mouth and is noting improvement in daytime sleepiness  As far as her dyspnea, she describes it as primarily exertional   She denies wheezing, chest tightness or significant cough  Review of Systems:  Review of Systems   Constitutional: Negative  Eyes: Negative  Respiratory: Positive for chest tightness, shortness of breath and wheezing  Negative for cough  Cardiovascular: Positive for leg swelling  Negative for palpitations  Gastrointestinal: Negative  Endocrine: Negative  Genitourinary: Negative  Musculoskeletal: Negative  Skin: Negative  Allergic/Immunologic: Negative  Neurological: Negative  Psychiatric/Behavioral: Positive for sleep disturbance  Negative for decreased concentration  The patient is not hyperactive            Social history updates:  Social History     Tobacco Use   Smoking Status Former Smoker    Packs/day: 2 00    Years: 10 00    Pack years: 20 00    Types: Cigarettes    Last attempt to quit:     Years since quittin 6   Smokeless Tobacco Never Used   Tobacco Comment    quit in      Social History     Socioeconomic History    Marital status: /Civil Union     Spouse name: Not on file    Number of children: Not on file    Years of education: Not on file    Highest education level: Not on file   Occupational History    Not on file   Social Needs    Financial resource strain: Not on file    Food insecurity:     Worry: Not on file     Inability: Not on file    Transportation needs:     Medical: Not on file     Non-medical: Not on file   Tobacco Use    Smoking status: Former Smoker     Packs/day: 2 00     Years: 10 00     Pack years: 20 00     Types: Cigarettes     Last attempt to quit:      Years since quittin 6    Smokeless tobacco: Never Used    Tobacco comment: quit in    Substance and Sexual Activity    Alcohol use: Yes     Frequency: Monthly or less     Drinks per session: 1 or 2     Binge frequency: Never     Comment: rare    Drug use: No    Sexual activity: Not on file   Lifestyle    Physical activity:     Days per week: Not on file     Minutes per session: Not on file    Stress: Not on file   Relationships    Social connections:     Talks on phone: Not on file     Gets together: Not on file     Attends Anabaptism service: Not on file     Active member of club or organization: Not on file     Attends meetings of clubs or organizations: Not on file     Relationship status: Not on file    Intimate partner violence:     Fear of current or ex partner: Not on file     Emotionally abused: Not on file     Physically abused: Not on file     Forced sexual activity: Not on file   Other Topics Concern    Not on file   Social History Narrative    Not on file       PhysicalExamination:  Vitals:   /78 (BP Location: Left arm, Patient Position: Sitting)   Pulse 70   Temp 97 9 °F (36 6 °C) (Tympanic)   Ht 5' 4" (1 626 m)   Wt 76 3 kg (168 lb 3 2 oz)   SpO2 96%   BMI 28 87 kg/m²    Physical Exam  General: Pleasant, Awake alert and oriented x 3, conversant without conversational dyspnea, NAD, normal affect  HEENT:  PERRL, Sclera noninjected, nonicteric OU, Nares patent,  no craniofacial abnormalities, Mucous membranes, moist, no oral lesions, normal dentition, Mallampati class 3  NECK: Trachea midline, no accessory muscle use, no stridor, no cervical or supraclavicular adenopathy, JVP not elevated  CARDIAC: Reg, single s1/S2, no m/r/g  PULM: CTA bilaterally no wheezing, rhonchi or rales  ABD: Normoactive bowel sounds, soft nontender, nondistended, no rebound, no rigidity, no guarding  EXT: No cyanosis, no clubbing, no edema, normal capillary refill  NEURO: no focal neurologic deficits, AAOx3, moving all extremities appropriately      Diagnostic Data:  Labs: I personally reviewed the most recent laboratory data pertinent to today's visit  I have personally reviewed pertinent lab results  Lab Results   Component Value Date    WBC 8 02 04/06/2019    HGB 11 9 04/06/2019    HCT 36 3 04/06/2019    MCV 85 04/06/2019     04/06/2019     Lab Results   Component Value Date    GLUCOSE 168 (H) 11/30/2018    CALCIUM 9 7 07/10/2019    K 4 2 07/10/2019    CO2 28 07/10/2019     07/10/2019    BUN 42 (H) 04/06/2019    CREATININE 1 43 07/10/2019     No results found for: IGE  Lab Results   Component Value Date    ALT 31 04/06/2019    AST 29 04/06/2019    ALKPHOS 75 04/06/2019       PFTs:  The most recent pulmonary function tests were reviewed  Jon Leiva in office today  Prebronchodilator  FVC - 2 29 (79%)  FEV1 - 2 20 (83%)  FEV1/FVC - 76%     Post bronchodilator  FVC - 2 37 (82%)  FEV1 - 1 86 (84%)  FEV1/FVC -  78%  Very mild restriction      6 minute walk today  Starting saturation - 94%   Starting HR - 74 bpm  Lowest saturation - 90%    Highest HR - 94 bpm  Ambulated - 252 m without the need for oxygen     Imaging  I personally reviewed the images on the [facility] system pertinent to today's visit  No imaging noted in system     Cardiac:  Echo performed at [facility]     Sleep studies:  Diagnostic: 2011 -  AHI 13 1, severe hypoxia (sat 66%)     CPAP 5/8/19  Mrs Fuentes underwent a titration of CPAP to 10 cc of H2O pressure  She did well at 9 cc of H2O pressure in REM when in  the nonsupine position  However, she did not sleep much in the supine position  Therefore, I recommend a trial of autotitrating  CPAP 9 -20 cc of H2O pressure with Cflex 3 and heated humidification using a small Resmed Airfit F20    Compliance should be evaluated in 1-2 months  In addition, she had an increased number of limb movements (PLM index 35 7)   If she still has daytime sleepiness, I would  recommend pharmacologic treatment with Neurontin, Lyrica, Mirapex or Requip    New Compliance Data:  Type of CPAP: CPAP 9 - 20 6/29/19 - 7/28/19, mean 10 2, Max 14 1                                   Percent usage: 100%                                   Average time used: 6 hrs 22 mins, up to 8 hrs 32 mins                                  Time in large leak: 32 seconds                                   Residual AHI: 4 9                                       Compliance Data:  Type of CPAP: CPAP 12 3/5/19 - 4/3/19                                   Percent usage: 100%                                   Average time used: 5 hrs 40 mins, up to 8 hrs 32 mins                                  Time in large leak: 7 mins                                   Residual AHI: 2 5                                        Compliance Data:  Type of CPAP:  10                                   Average time used: 2 hrs                                   Residual AHI: 2 2          Harshal Severino DO

## 2019-08-01 NOTE — PROGRESS NOTES
Progress note - Pulmonary Medicine   Vera De La Torre 78 y o  female MRN: [id number]       Impression & Plan:   78 y o  F with PMHx of DM, HTN, Hyperlipidemia and hiatal hernia s/p repair who comes in for follow up with her BARBARA s/p UPPP on CPAP and dyspnea on exertion     1   Mild BARBARA (AHI - 13 1) now on autoPAP 9-20 without oxygen   Residual AHI of 4 9, mostly obstructive   She has good compliance   ABG without evidence of chronic hypercapnia  -  increase autoPAP to 10-20 cc of H2O pressure  -  Will renew supplies annually      -  She is aware of the risk of leaving sleep apnea untreated including hypertension, heart failure, arrhythmia, MI and stroke      2   RLS and significant neuropathy -  still present but improved on current regimen       -  Continue Neurontin 300mg PO at dinner and 600mg at bedtime  Consider increase to 900mg qHS if still persists  -  Continue Mirapex 0 25 qHS  Reduced due to compulsive gambling          3   Dyspnea with exertion -  in part obesity, deconditioning, diastolic HF and possible pulmonary hypertension   Spirometry is unremarkable  However, can not rule out asthma as clinical scenario seems like it could be involved   6 minute walk with borderline desaturation       -  Check full PFTs      -  Will trial 2 weeks of asmanex  -  Will also trial nasonex to help combat post nasal drip        5   Mild pulmonary hypertension/RV hypertrophy on echo -  The patient would like a second opinion and repeat evaluation of cardiac status  She prefers to have it performed at [facility]    ______________________________________________________________________    HPI:    Vera De La Torre presents today for follow-up of for her BARBARA, PLMS and dyspnea  She state that she has been sleeping better with conversion to autoCPAP  She denies fatigue, dry mouth and is noting improvement in daytime sleepiness         As far as her dyspnea, she describes it as primarily exertional   She denies wheezing, chest tightness or significant cough  Review of Systems:  Review of Systems   Constitutional: Negative  Eyes: Negative  Respiratory: Positive for chest tightness, shortness of breath and wheezing  Negative for cough  Cardiovascular: Positive for leg swelling  Negative for palpitations  Gastrointestinal: Negative  Endocrine: Negative  Genitourinary: Negative  Musculoskeletal: Negative  Skin: Negative  Allergic/Immunologic: Negative  Neurological: Negative  Psychiatric/Behavioral: Positive for sleep disturbance  Negative for decreased concentration  The patient is not hyperactive            Social history updates:  Social History     Tobacco Use   Smoking Status Former Smoker    Packs/day: 2 00    Years: 10 00    Pack years: 20 00    Types: Cigarettes    Last attempt to quit:     Years since quittin 6   Smokeless Tobacco Never Used   Tobacco Comment    quit in      Social History     Socioeconomic History    Marital status: /Civil Union     Spouse name: Not on file    Number of children: Not on file    Years of education: Not on file    Highest education level: Not on file   Occupational History    Not on file   Social Needs    Financial resource strain: Not on file    Food insecurity:     Worry: Not on file     Inability: Not on file    Transportation needs:     Medical: Not on file     Non-medical: Not on file   Tobacco Use    Smoking status: Former Smoker     Packs/day: 2 00     Years: 10 00     Pack years: 20 00     Types: Cigarettes     Last attempt to quit:      Years since quittin 6    Smokeless tobacco: Never Used    Tobacco comment: quit in    Substance and Sexual Activity    Alcohol use: Yes     Frequency: Monthly or less     Drinks per session: 1 or 2     Binge frequency: Never     Comment: rare    Drug use: No    Sexual activity: Not on file   Lifestyle    Physical activity:     Days per week: Not on file Minutes per session: Not on file    Stress: Not on file   Relationships    Social connections:     Talks on phone: Not on file     Gets together: Not on file     Attends Mandaeism service: Not on file     Active member of club or organization: Not on file     Attends meetings of clubs or organizations: Not on file     Relationship status: Not on file    Intimate partner violence:     Fear of current or ex partner: Not on file     Emotionally abused: Not on file     Physically abused: Not on file     Forced sexual activity: Not on file   Other Topics Concern    Not on file   Social History Narrative    Not on file       PhysicalExamination:  Vitals:   /78 (BP Location: Left arm, Patient Position: Sitting)   Pulse 70   Temp 97 9 °F (36 6 °C) (Tympanic)   Ht 5' 4" (1 626 m)   Wt 76 3 kg (168 lb 3 2 oz)   SpO2 96%   BMI 28 87 kg/m²   Physical Exam  General: Pleasant, Awake alert and oriented x 3, conversant without conversational dyspnea, NAD, normal affect  HEENT:  PERRL, Sclera noninjected, nonicteric OU, Nares patent,  no craniofacial abnormalities, Mucous membranes, moist, no oral lesions, normal dentition, Mallampati class 3  NECK: Trachea midline, no accessory muscle use, no stridor, no cervical or supraclavicular adenopathy, JVP not elevated  CARDIAC: Reg, single s1/S2, no m/r/g  PULM: CTA bilaterally no wheezing, rhonchi or rales  ABD: Normoactive bowel sounds, soft nontender, nondistended, no rebound, no rigidity, no guarding  EXT: No cyanosis, no clubbing, no edema, normal capillary refill  NEURO: no focal neurologic deficits, AAOx3, moving all extremities appropriately      Diagnostic Data:  Labs: I personally reviewed the most recent laboratory data pertinent to today's visit  I have personally reviewed pertinent lab results    Lab Results   Component Value Date    WBC 8 02 04/06/2019    HGB 11 9 04/06/2019    HCT 36 3 04/06/2019    MCV 85 04/06/2019     04/06/2019     Lab Results Component Value Date    GLUCOSE 168 (H) 11/30/2018    CALCIUM 9 7 07/10/2019    K 4 2 07/10/2019    CO2 28 07/10/2019     07/10/2019    BUN 42 (H) 04/06/2019    CREATININE 1 43 07/10/2019     No results found for: IGE  Lab Results   Component Value Date    ALT 31 04/06/2019    AST 29 04/06/2019    ALKPHOS 75 04/06/2019       PFTs:  The most recent pulmonary function tests were reviewed  Raquel Morin in office today  Prebronchodilator  FVC - 2 29 (79%)  FEV1 - 2 20 (83%)  FEV1/FVC - 76%     Post bronchodilator  FVC - 2 37 (82%)  FEV1 - 1 86 (84%)  FEV1/FVC -  78%  Very mild restriction      6 minute walk today  Starting saturation - 94%   Starting HR - 74 bpm  Lowest saturation - 90%    Highest HR - 94 bpm  Ambulated - 252 m without the need for oxygen     Imaging  I personally reviewed the images on the [facility] system pertinent to today's visit  No imaging noted in system     Cardiac:  Echo performed at [facility]     Sleep studies:  Diagnostic: 2011 -  AHI 13 1, severe hypoxia (sat 66%)     CPAP 5/8/19  Mrs Fuentes underwent a titration of CPAP to 10 cc of H2O pressure  She did well at 9 cc of H2O pressure in REM when in  the nonsupine position  However, she did not sleep much in the supine position  Therefore, I recommend a trial of autotitrating  CPAP 9 -20 cc of H2O pressure with Cflex 3 and heated humidification using a small Resmed Airfit F20  Compliance should be evaluated in 1-2 months  In addition, she had an increased number of limb movements (PLM index 35 7)   If she still has daytime sleepiness, I would  recommend pharmacologic treatment with Neurontin, Lyrica, Mirapex or Requip    New Compliance Data:  Type of CPAP: CPAP 9 - 20 6/29/19 - 7/28/19, mean 10 2, Max 14 1                                   Percent usage: 100%                                   Average time used: 6 hrs 22 mins, up to 8 hrs 32 mins                                  Time in large leak: 32 seconds Residual AHI: 4 9                                       Compliance Data:  Type of CPAP: CPAP 12 3/5/19 - 4/3/19                                   Percent usage: 100%                                   Average time used: 5 hrs 40 mins, up to 8 hrs 32 mins                                  Time in large leak: 7 mins                                   Residual AHI: 2 5                                        Compliance Data:  Type of CPAP:  10                                   Average time used: 2 hrs                                   Residual AHI: 2 2          Fatoumata Manzo DO

## 2019-08-02 ENCOUNTER — HOSPITAL ENCOUNTER (OUTPATIENT)
Dept: PULMONOLOGY | Facility: HOSPITAL | Age: 69
Discharge: HOME | End: 2019-08-02
Attending: INTERNAL MEDICINE
Payer: MEDICARE

## 2019-08-02 DIAGNOSIS — R06.00 DYSPNEA, UNSPECIFIED TYPE: ICD-10-CM

## 2019-08-02 PROCEDURE — 94760 N-INVAS EAR/PLS OXIMETRY 1: CPT

## 2019-08-02 PROCEDURE — 94726 PLETHYSMOGRAPHY LUNG VOLUMES: CPT

## 2019-08-02 PROCEDURE — 94060 EVALUATION OF WHEEZING: CPT

## 2019-08-02 PROCEDURE — 94729 DIFFUSING CAPACITY: CPT | Performed by: INTERNAL MEDICINE

## 2019-08-02 PROCEDURE — 94729 DIFFUSING CAPACITY: CPT

## 2019-08-02 PROCEDURE — 94726 PLETHYSMOGRAPHY LUNG VOLUMES: CPT | Performed by: INTERNAL MEDICINE

## 2019-08-02 PROCEDURE — 94060 EVALUATION OF WHEEZING: CPT | Performed by: INTERNAL MEDICINE

## 2019-08-02 RX ORDER — ALBUTEROL SULFATE 2.5 MG/3ML
2.5 SOLUTION RESPIRATORY (INHALATION) ONCE
Status: COMPLETED | OUTPATIENT
Start: 2019-08-02 | End: 2019-08-02

## 2019-08-02 RX ADMIN — ALBUTEROL SULFATE 2.5 MG: 2.5 SOLUTION RESPIRATORY (INHALATION) at 16:19

## 2019-08-07 DIAGNOSIS — I12.9 HYPERTENSIVE CHRONIC KIDNEY DISEASE WITH STAGE 1 THROUGH STAGE 4 CHRONIC KIDNEY DISEASE, OR UNSPECIFIED CHRONIC KIDNEY DISEASE: ICD-10-CM

## 2019-08-07 RX ORDER — AMLODIPINE BESYLATE 5 MG/1
5 TABLET ORAL DAILY
Qty: 90 TABLET | Refills: 3 | Status: SHIPPED | OUTPATIENT
Start: 2019-08-07 | End: 2019-10-18 | Stop reason: SDUPTHER

## 2019-08-14 ENCOUNTER — TELEPHONE (OUTPATIENT)
Dept: NEPHROLOGY | Facility: CLINIC | Age: 69
End: 2019-08-14

## 2019-08-14 DIAGNOSIS — R80.1 PERSISTENT PROTEINURIA: ICD-10-CM

## 2019-08-14 DIAGNOSIS — N18.30 CHRONIC KIDNEY DISEASE, STAGE III (MODERATE) (HCC): ICD-10-CM

## 2019-08-14 DIAGNOSIS — I12.9 HYPERTENSIVE CHRONIC KIDNEY DISEASE WITH STAGE 1 THROUGH STAGE 4 CHRONIC KIDNEY DISEASE, OR UNSPECIFIED CHRONIC KIDNEY DISEASE: ICD-10-CM

## 2019-08-14 RX ORDER — CHLORTHALIDONE 25 MG/1
12.5 TABLET ORAL EVERY OTHER DAY
COMMUNITY
End: 2019-11-07

## 2019-08-14 NOTE — TELEPHONE ENCOUNTER
Pt c/o leg cramps, mostly at night and spasms in her belly  Feels this is happening since starting Chlorthalidone 12 5 mg  QD and she has been dieting as well  Asking if she can take anything for this?

## 2019-08-14 NOTE — TELEPHONE ENCOUNTER
I would recommend a couple of things:  1  Make sure she hydrates well  2  She can try taking the chlorthalidone every other day  3  She can use quinine over-the-counter, check with the drugstore for the cramps  4   If she does cut back the chlorthalidone please have her wait 2-3 weeks and send in a week a blood pressure readings morning and evening:  -the morning readings before any medications  -the evening readings before supper

## 2019-09-12 ENCOUNTER — DOCUMENTATION (OUTPATIENT)
Dept: NEPHROLOGY | Facility: CLINIC | Age: 69
End: 2019-09-12

## 2019-09-12 DIAGNOSIS — I12.9 HYPERTENSIVE CHRONIC KIDNEY DISEASE WITH STAGE 1 THROUGH STAGE 4 CHRONIC KIDNEY DISEASE, OR UNSPECIFIED CHRONIC KIDNEY DISEASE: ICD-10-CM

## 2019-09-12 DIAGNOSIS — N18.30 CHRONIC KIDNEY DISEASE, STAGE III (MODERATE) (HCC): Primary | ICD-10-CM

## 2019-09-12 NOTE — PROGRESS NOTES
Patient's creatinine from August 29th following surgery was 1 91  It is still in the range however higher than it was in July  This may be in part related to chlorthalidone  Recommend:  1  Repeat a basic metabolic profile at this time  2  Review medications  3  Make sure she is not taking any NSAIDs such as Motrin Naprosyn Aleve Advil etc   4  Regards to the blood pressure:  Reinforce low sodium diet, exercise as able, continue trying to pursue weight loss as well  In regards to medication change:  I would increase amlodipine to 7 5 mg in the evening please watch for swelling  5  Wait 2-3 weeks at least and then take an additional week a blood pressure readings morning evening just sitting:  · Take the morning readings before any medications  · Take the evening readings before supper  · When taking standing readings, keep your arm supported at heart level and not dangling  · Make sure you are sitting with your back supported in feet on the ground on crossed  · Make sure you have not taken any coffee or caffeine products or exercised or smoke cigarettes at least 30 minutes before taking your blood pressure      Blood pressure readings:  -a m :  142/78 without orthostatic changes  -p  m :  135/75 without orthostatic changes  Heart rate:  60-70 range

## 2019-09-12 NOTE — PROGRESS NOTES
Spoke with the patient and she is aware of her lab test results and to repeat a BMP now which has been mailed out for the patient  She is aware to avoid NSAID's which she has been doing  She has had no recent medication changes besides for what Dr Yanely Denton advised at last appointment  She is aware to follow a low sodium diet, and to exercise as able  She is aware to increase amlodipine to 7 5 mg in the evening and in 2-3 weeks take another weeks of blood pressures  No further questions at this time        Samantha Huff MA

## 2019-09-20 ENCOUNTER — CONSULT (OUTPATIENT)
Dept: CARDIOLOGY CLINIC | Facility: CLINIC | Age: 69
End: 2019-09-20
Payer: MEDICARE

## 2019-09-20 VITALS — HEIGHT: 64 IN | WEIGHT: 168 LBS | BODY MASS INDEX: 28.68 KG/M2

## 2019-09-20 DIAGNOSIS — I12.9 HYPERTENSIVE CHRONIC KIDNEY DISEASE WITH STAGE 1 THROUGH STAGE 4 CHRONIC KIDNEY DISEASE, OR UNSPECIFIED CHRONIC KIDNEY DISEASE: ICD-10-CM

## 2019-09-20 DIAGNOSIS — I27.20 MILD PULMONARY HYPERTENSION (HCC): ICD-10-CM

## 2019-09-20 DIAGNOSIS — I27.20 PULMONARY HYPERTENSION (HCC): Primary | ICD-10-CM

## 2019-09-20 DIAGNOSIS — I15.9 SECONDARY HYPERTENSION: ICD-10-CM

## 2019-09-20 DIAGNOSIS — E78.2 MIXED HYPERLIPIDEMIA: ICD-10-CM

## 2019-09-20 DIAGNOSIS — R06.00 DYSPNEA ON EXERTION: ICD-10-CM

## 2019-09-20 PROCEDURE — 99204 OFFICE O/P NEW MOD 45 MIN: CPT | Performed by: INTERNAL MEDICINE

## 2019-09-20 NOTE — PATIENT INSTRUCTIONS
Dyspnea   AMBULATORY CARE:   What is dyspnea? Dyspnea is breathing difficulty or discomfort  You may have labored, painful, or shallow breathing  You may feel breathless or short of breath  Dyspnea can occur during rest or with activity  You may have dyspnea for a short time, or it might become chronic  Dyspnea is often a symptom of a disease or condition  An allergic reaction, anxiety, or travel to high altitudes can increase your risk for dyspnea  Your risk is also increased by a lung condition such as asthma, a heart condition such as heart failure, or a nerve condition  Being overweight or smoking cigarettes can also lead to dyspnea  Signs and symptoms that can occur with dyspnea:   · Chest tightness or pain    · Cough or a coarse or high-pitched noise when you breathe    · Pale and sweaty, cool skin    · Confusion and tiredness    · Bluish-gray lips or nails  Seek care immediately if:   · Your signs and symptoms are the same or worse within 24 hours of treatment  · You have shaking chills or a fever over 102°F      · You have new pain, pressure, or tightness in your chest      · You have a new or worse cough or wheezing, or you cough up blood  · You feel like you cannot get enough air  · The skin over your ribs or on your neck sinks in when you breathe  · You have a severe headache with vomiting and abdominal pain  · You feel confused or dizzy  Contact your healthcare provider if:   · You have questions or concerns about your condition or care  Treatment:  You will work with your healthcare provider to treat the condition causing your dyspnea  You may need the following to improve your symptoms:  · Oxygen therapy  may be used to help you breathe easier  You may need oxygen if your blood oxygen level is lower than it should be  · Medicines  may be used to treat the cause of your dyspnea   Medicines may reduce swelling in your airway or decrease extra fluid from around your heart or lungs  Other medicines may be used to decrease anxiety and help you feel calm and relaxed  · Pulmonary rehabilitation  is used to reduce your symptoms while keeping you active  You may learn breathing techniques, muscle strengthening, and how to pace yourself when you are active  Manage long-term dyspnea:   · Create an action plan  You and your healthcare provider can work together to create a plan for how to handle episodes of dyspnea  The plan can include daily activities, treatment changes, and what to do if you have severe breathing problems  · Lean forward on your elbows when you sit  This helps your lungs expand and may make it easier to breathe  · Use pursed-lip breathing any time you feel short of breath  Breathe in through your nose and then slowly breathe out through your mouth with your lips slightly puckered  It should take you twice as long to breathe out as it did to breathe in  · Do not smoke  Nicotine and other chemicals in cigarettes and cigars can cause lung damage and make it harder to breathe  Ask your healthcare provider for information if you currently smoke and need help to quit  E-cigarettes or smokeless tobacco still contain nicotine  Talk to your healthcare provider before you use these products  · Reach or maintain a healthy weight  Your healthcare provider can help you create a safe weight loss plan if you are overweight  · Exercise as directed  Exercise can help your lungs work more easily  Exercise can also help you lose weight if needed  Try to get at least 30 minutes of exercise most days of the week  Your healthcare provider can help you create an exercise plan that is safe for you  Follow up with your healthcare provider or specialist as directed:  Write down your questions so you remember to ask them during your visits    © 2017 [employer] Information is for End User's use only and may not be sold, redistributed or otherwise used for commercial purposes  All illustrations and images included in CareNotes® are the copyrighted property of [employer]  or [employer]  The above information is an  only  It is not intended as medical advice for individual conditions or treatments  Talk to your doctor, nurse or pharmacist before following any medical regimen to see if it is safe and effective for you

## 2019-09-20 NOTE — PROGRESS NOTES
Cardiology Consultation     Chucho Null  [id number]  1950  [facility] [place]  [address] [facility] [address]    1  [facility] ([facility] )     2  Secondary hypertension  Ambulatory referral to Cardiology   3  Mild pulmonary hypertension ([facility] )  Ambulatory referral to Cardiology   4  Dyspnea on exertion  Holter monitor - 24 hour   5  Hypertensive chronic kidney disease with stage 1 through stage 4 chronic kidney disease, or unspecified chronic kidney disease     6  Mixed hyperlipidemia       Chief Complaint   Patient presents with    Follow-up     establishing new cardiac care     Shortness of Breath     with exertion     Palpitations     ocassionally and brought on with exertion     Leg Swelling     both     Fatigue     increased in the last 6 months      HPI: Patient is here for cardiac evaluation of RAGSDALE and palpitations that also occur with exertion  Has been going on for at least the past 6 months  Has not necessarily been getting progressively worse  she has had a few surgeries, and so she has been unable to push herself  She is currently in a boot for her RLE  Has bilateral LE edema and increased fatigue over the past 6 months as well  No reported chest pain,  lightheadedness, syncope, orthopnea, PND, or significant weight changes        Patient Active Problem List   Diagnosis    Chronic kidney disease, stage III (moderate) (HCC)    Hypertensive chronic kidney disease with stage 1 through stage 4 chronic kidney disease, or unspecified chronic kidney disease    BARBARA on CPAP    RLS (restless legs syndrome)    Persistent proteinuria    Diabetic nephropathy associated with type 2 diabetes mellitus ([place] [address] )    Pulmonary hypertension ([place] [address] )    Dyspnea    Mixed hyperlipidemia     Past Medical History:   Diagnosis Date    Anxiety     Arthritis     Diabetes mellitus ([place] [address] )     Family history of thyroid problem     Heart burn     [facility]plasia, [facility]roid ([facility] )     Hypertension     Kidney problem     Seasonal allergies     Sleep apnea     Swollen ankles      Social History     Socioeconomic History    Marital status: /Civil Union     Spouse name: Not on file    Number of children: Not on file    Years of education: Not on file    Highest education level: Not on file   Occupational History    Not on file   Social Needs    Financial resource strain: Not on file    Food insecurity:     Worry: Not on file     Inability: Not on file    Transportation needs:     Medical: Not on file     Non-medical: Not on file   Tobacco Use    Smoking status: Former Smoker     Packs/day: 2 00     Years: 10      Pack years: 20 00     Types: Cigarettes     Last attempt to quit:      Years since quittin 7    Smokeless tobacco: Never Used    Tobacco comment: quit in    Substance and Sexual Activity    Alcohol use: Yes     Frequency: Monthly or less     Drinks per session: 1 or 2     Binge frequency: Never     Comment: rare    Drug use: No    Sexual activity: Not on file   Lifestyle    Physical activity:     Days per week: Not on file     Minutes per session: Not on file    Stress: Not on file   Relationships    Social connections:     Talks on phone: Not on file     Gets together: Not on file     Attends Ca[employer] service: Not on file     Active member of club or organization: Not on file     Attends meetings of clubs or organizations: Not on file     Relationship status: Not on file    Intimate partner violence:     Fear of current or ex partner: Not on file     Emotionally abused: Not on file     Physically abused: Not on file     Forced sexual activity: Not on file   Other Topics Concern    Not on file   Social History Narrative    Not on file      Family History   Problem Relation Age of Onset    Heart disease Mother     Heart disease Father     Cancer Father         lung cancer Past Surgical History:   Procedure Laterality Date    ARTHROSCOPY KNEE      BREAST BIOPSY      CHOLECYSTECTOMY      HIATAL HERNIA REPAIR      LIPOSUCTION      REDUCTION MAMMAPLASTY      SQUAMOUS CELL CARCINOMA EXCISION      TOE SURGERY      TONSILLECTOMY      TUBAL LIGATION         Current Outpatient Medications:     amLODIPine (NORVASC) 5 mg tablet, Take 1 tablet (5 mg total) by mouth daily (Patient taking differently: Take 7 5 mg by mouth daily ), Disp: 90 tablet, Rfl: 3    b complex vitamins tablet, Take 1 tablet by mouth daily , Disp: , Rfl:     B-D ULTRAFINE III SHORT PEN 31G X 8 MM MISC, USE AS DIRECTED 4 TIMES PER DAY, Disp: , Rfl: 3    Calcium Carbonate (CALCIUM 600 PO), Take 1 capsule by mouth 2 (two) times a day, Disp: , Rfl:     chlorthalidone 25 mg tablet, Take 12 5 mg by mouth every other day, Disp: , Rfl:     Dulaglutide (TRULICITY) 9 32 IW/3 4IK SOPN, Inject 0 75 mg under the skin once a week, Disp: , Rfl:     DULoxetine (CYMBALTA) 60 mg delayed release capsule, Take 60 mg by mouth daily, Disp: , Rfl:     ezetimibe (ZETIA) 10 mg tablet, Take 10 mg by mouth daily , Disp: , Rfl:     fenofibrate (TRICOR) 145 mg tablet, Take 145 mg by mouth daily, Disp: , Rfl:     gabapentin (NEURONTIN) 300 mg capsule, Take 300 mg by mouth daily Take 300 mg with dinner and 600mg at bedtime , Disp: , Rfl:     glimepiride (AMARYL) 2 mg tablet, Take 4 mg by mouth 2 (two) times a day , Disp: , Rfl:     insulin detemir (LEVEMIR) 100 units/mL subcutaneous injection, Inject 30 Units under the skin every morning , Disp: , Rfl:     levothyroxine 175 mcg tablet, Take 175 mcg by mouth daily, Disp: , Rfl:     losartan (COZAAR) 50 mg tablet, Take 50 mg by mouth daily , Disp: , Rfl:     Magnesium 500 MG CAPS, Take 1 capsule by mouth 3 (three) times a day, Disp: , Rfl:     methocarbamol (ROBAXIN) 500 mg tablet, Take 500 mg by mouth daily at bedtime as needed, Disp: , Rfl: 2    metoprolol tartrate (LOPRESSOR) 50 mg tablet, Take 50 mg by mouth every 12 (twelve) hours  , Disp: , Rfl:     Multiple Vitamin (MULTIVITAMIN) capsule, Take 1 capsule by mouth daily, Disp: , Rfl:     NOVOLOG FLEXPEN 100 units/mL injection pen, PLEASE SEE ATTACHED FOR DETAILED DIRECTIONS, Disp: , Rfl: 0    omega-3-acid ethyl esters (LOVAZA) 1 g capsule, , Disp: , Rfl:     omeprazole (PriLOSEC) 40 MG capsule, Take 40 mg by mouth daily , Disp: , Rfl:     pramipexole (MIRAPEX) 0 25 mg tablet, Take 0 25 mg by mouth daily, Disp: , Rfl:     rosuvastatin (CRESTOR) 5 mg tablet, Take 5 mg by mouth daily, Disp: , Rfl:     Vitamin D, Ergocalciferol, 2000 units CAPS, Take 2,000 Units by mouth daily , Disp: , Rfl:     vitamin E, tocopherol, 1,000 units capsule, Take 2,000 Units by mouth daily , Disp: , Rfl:     albuterol (VENTOLIN HFA) 90 mcg/act inhaler, Ventolin HFA 90 mcg/actuation aerosol inhaler, Disp: , Rfl:     cyclobenzaprine (FLEXERIL) 10 mg tablet, Take 10 mg by mouth daily, Disp: , Rfl:     ergocalciferol (VITAMIN D2) 50,000 units, Take 1 capsule (50,000 Units total) by mouth once a week for 12 doses, Disp: 12 capsule, Rfl: 0    METHOCARBAMOL PO, Take by mouth daily at bedtime, Disp: , Rfl:   Allergies   Allergen Reactions    Ciprofloxacin Hives     Vitals:    09/20/19 0911   Weight: 76 2 kg (168 lb)   Height: 5' 4" (1 626 m)       Labs:  Abstract on 07/18/2019   Component Date Value    SODIUM 07/10/2019 139     POTASSIUM 07/10/2019 4 2     CHLORIDE 07/10/2019 104     CO2 07/10/2019 28     CREATININE 07/10/2019 1 43     Glucose 07/10/2019 146     EXTERNAL CALCIUM 07/10/2019 9 7     EXTERNAL EGFR 07/10/2019 37    Orders Only on 05/23/2019   Component Date Value    PROTEIN UA 05/23/2019 33 9     EXT Creatinine Urine 05/23/2019 45 5     EXTERNAL Ur Prot/Creat R* 05/23/2019 0 75    Orders Only on 05/17/2019   Component Date Value    Hemoglobin A1C 05/17/2019 8 5     EXT Creatinine Urine 05/17/2019 74 0     Microalbum  ,U,Random 05/17/2019 37 4     EXTERNAL Microalb/Creat * 05/17/2019 505 4    Admission on 04/06/2019, Discharged on 04/06/2019   Component Date Value    POC Glucose 04/06/2019 232*    WBC 04/06/2019 8 02     RBC 04/06/2019 4 25     Hemoglobin 04/06/2019 11 9     Hematocrit 04/06/2019 36 3     MCV 04/06/2019 85     MCH 04/06/2019 28 0     MCHC 04/06/2019 32 8     RDW 04/06/2019 14 2     MPV 04/06/2019 11 8     Platelets 13/60/5117 328     nRBC 04/06/2019 0     Neutrophils Relative 04/06/2019 60     Immat GRANS % 04/06/2019 1     Lymphocytes Relative 04/06/2019 28     Monocytes Relative 04/06/2019 7     Eosinophils Relative 04/06/2019 4     Basophils Relative 04/06/2019 0     Neutrophils Absolute 04/06/2019 4 91     Immature Grans Absolute 04/06/2019 0 04     Lymphocytes Absolute 04/06/2019 2 22     Monocytes Absolute 04/06/2019 0 52     Eosinophils Absolute 04/06/2019 0 31     Basophils Absolute 04/06/2019 0 02     Sodium 04/06/2019 138     Potassium 04/06/2019 3 7     Chloride 04/06/2019 100     CO2 04/06/2019 27     ANION GAP 04/06/2019 11     BUN 04/06/2019 42*    Creatinine 04/06/2019 1 71*    Glucose 04/06/2019 256*    Calcium 04/06/2019 9 3     AST 04/06/2019 29     ALT 04/06/2019 31     Alkaline Phosphatase 04/06/2019 75     Total Protein 04/06/2019 7 2     Albumin 04/06/2019 3 9     Total Bilirubin 04/06/2019 0 20     eGFR 04/06/2019 30     Sed Rate 04/06/2019 18     Ventricular Rate 04/06/2019 89     Atrial Rate 04/06/2019 89     RI Interval 04/06/2019 210     QRSD Interval 04/06/2019 88     QT Interval 04/06/2019 330     QTC Interval 04/06/2019 401     P Axis 04/06/2019 58     QRS Palmer 04/06/2019 -10     T Wave Axis 04/06/2019 47      No results found for: CHOL, TRIG, HDL, LDLDIRECT  Imaging: No results found      Review of Systems:  Review of Systems   Constitutional: Negative for activity change, appetite change, chills, diaphoresis, fatigue and unexpected weight change  HENT: Negative for hearing loss, nosebleeds and sore throat  Eyes: Negative for photophobia and visual disturbance  Respiratory: Positive for shortness of breath  Negative for cough, chest tightness and wheezing  Cardiovascular: Positive for palpitations  Negative for chest pain and leg swelling  Gastrointestinal: Negative for abdominal pain, diarrhea, nausea and vomiting  Endocrine: Negative for polyuria  Genitourinary: Negative for dysuria, frequency and hematuria  Musculoskeletal: Negative for arthralgias, back pain, gait problem and neck pain  Skin: Negative for pallor and rash  Neurological: Negative for dizziness, syncope and headaches  Hematological: Does not bruise/bleed easily  Psychiatric/Behavioral: Negative for behavioral problems and confusion  Physical Exam:  Physical Exam   Constitutional: She is oriented to person, place, and time  She appears well-developed and well-nourished  HENT:   Head: Normocephalic and atraumatic  Nose: Nose normal    Eyes: Pupils are equal, round, and reactive to light  EOM are normal  No scleral icterus  Neck: Normal range of motion  Neck supple  No JVD present  Cardiovascular: Normal rate, regular rhythm and normal heart sounds  Exam reveals no gallop and no friction rub  No murmur heard  Pulmonary/Chest: Effort normal and breath sounds normal  No respiratory distress  She has no wheezes  She has no rales  Abdominal: Soft  Bowel sounds are normal  She exhibits no distension  There is no tenderness  Musculoskeletal: Normal range of motion  She exhibits no edema or deformity  Neurological: She is alert and oriented to person, place, and time  No cranial nerve deficit  Skin: Skin is warm and dry  No rash noted  She is not diaphoretic  Psychiatric: She has a normal mood and affect  Her behavior is normal    Vitals reviewed      Height 5' 4" (1 626 m), weight 76 2 kg (168 lb)     Discussion/Summary:  RAGSDALE: unclear etiology  Relatively normal echo in June 2018 without significant structural/valvular issues  She does have risk factors of age, HTN, HLD, post-menopausal, family history of CAD, former smoker and had a stress test in June 2018 for which we will obtain results  Will also check a Holter with palpitations with exertion noted  Pulm HTN: PASP noted to be 31 mmHg in June 2018 - which is not in the range of pulm HTN, so unlikely to be contributing to symptoms  HTN: continued on amlodipine    HLD: continued on zetia and statin

## 2019-10-01 ENCOUNTER — HOSPITAL ENCOUNTER (OUTPATIENT)
Dept: NON INVASIVE DIAGNOSTICS | Facility: CLINIC | Age: 69
Discharge: HOME | End: 2019-10-01
Payer: MEDICARE

## 2019-10-01 DIAGNOSIS — R06.00 DYSPNEA ON EXERTION: ICD-10-CM

## 2019-10-01 PROCEDURE — 93225 XTRNL ECG REC<48 HRS REC: CPT

## 2019-10-01 PROCEDURE — 93226 XTRNL ECG REC<48 HR SCAN A/R: CPT

## 2019-10-03 PROCEDURE — 93227 XTRNL ECG REC<48 HR R&I: CPT | Performed by: INTERNAL MEDICINE

## 2019-10-04 ENCOUNTER — TELEPHONE (OUTPATIENT)
Dept: CARDIOLOGY CLINIC | Facility: CLINIC | Age: 69
End: 2019-10-04

## 2019-10-04 NOTE — TELEPHONE ENCOUNTER
----- Message from Dinorah Wan MD sent at 10/3/2019 10:17 AM EDT -----  Results are within range, please notify patient

## 2019-10-18 DIAGNOSIS — I12.9 HYPERTENSIVE CHRONIC KIDNEY DISEASE WITH STAGE 1 THROUGH STAGE 4 CHRONIC KIDNEY DISEASE, OR UNSPECIFIED CHRONIC KIDNEY DISEASE: ICD-10-CM

## 2019-10-21 RX ORDER — AMLODIPINE BESYLATE 5 MG/1
TABLET ORAL
Qty: 135 TABLET | Refills: 3 | Status: SHIPPED | OUTPATIENT
Start: 2019-10-21 | End: 2019-12-06 | Stop reason: SDUPTHER

## 2019-11-04 LAB
CREAT ?TM UR-SCNC: 60.6 UMOL/L
EXT PROTEIN URINE: 32.3
PROT/CREAT UR: 0.53 MG/G{CREAT}

## 2019-11-06 ENCOUNTER — DOCUMENTATION (OUTPATIENT)
Dept: NEPHROLOGY | Facility: CLINIC | Age: 69
End: 2019-11-06

## 2019-11-06 LAB
CK SERPL-CCNC: 53 U/L (ref 26–192)
EXT GLUCOSE BLD: 147
EXTERNAL ALBUMIN: 3.6
EXTERNAL ALK PHOS: 51
EXTERNAL ALT: 28
EXTERNAL AST: 20
EXTERNAL BUN: 41
EXTERNAL CALCIUM: 9.3
EXTERNAL CHLORIDE: 106
EXTERNAL CO2: 28
EXTERNAL CREATININE: 1.72
EXTERNAL EGFR: 30
EXTERNAL POTASSIUM: 4.2
EXTERNAL SODIUM: 142
EXTERNAL T.BILIRUBIN: 0.3
EXTERNAL TOTAL PROTEIN: 6.3
HCT VFR BLD AUTO: 35.6 % (ref 34.8–46.1)
HGB BLD-MCNC: 11.8 G/DL (ref 11.5–15.4)
MAGNESIUM SERPL-MCNC: 2.1 MG/DL (ref 1.6–2.6)
PHOSPHATE SERPL-MCNC: 3.9 MG/DL (ref 2.3–4.1)
PLATELET # BLD AUTO: 268 THOUSANDS/UL (ref 149–390)
PROT/CREAT UR: 0.53 MG/G{CREAT} (ref 0–0.1)
WBC # BLD AUTO: 6.5 THOUSAND/UL

## 2019-11-06 NOTE — PROGRESS NOTES
RENAL FOLLOW UP NOTE: td    ASSESSMENT AND PLAN:  1   CKD stage 3 :  · Etiology:  Diabetic nephropathy/hypertensive nephrosclerosis/arteriolar nephrosclerosis/chronic NSAID use   No evidence of obstructive uropathy, renal artery disease or primary glomerular disease with a bland urinalysis  Of note, CPK was normal at 105 no evidence rhabdomyolysis  · Baseline creatinine:  1 5-2 0  · Current creatinine:   1 72 at baseline  · Urine protein creatinine ratio:  0 53 g at goal  Recommendations:  · Treat hypertension-please see below  · Treat dyslipidemia-please see below  · Maintain proteinuria less than 1 g or as low as possible  · Avoid nephrotoxic agents such as NSAIDs, patient counseled as such  2   Volume:  Euvolemic  3   Hypertension:  Workup:  · saline suppression test for primary aldosterone state was normal  · plasma free metanephrines was negative  · renal artery duplex was negative 02/2019       Current blood pressure averages:  none today  · Goal blood pressure:  less than 130/80  Recommendations:  · Push nonmedical regimen including weight loss, isotonic exercise and avoidance of salt; patient counseled as such  · Medication changes today:  The patient's blood pressure is elevated today  Based on her swelling and preference of taking the medication daily I would switch her chlorthalidone to torsemide 5 mg daily  Then I would have her take 1 week a blood pressure readings about 1-2 weeks after initiating this new medication  I of course will check another basic metabolic profile  4   Electrolytes:  all acceptable including a potassium of 4 2 which overall is improved  5   Mineral bone disorder:  · Calcium/magnesium/phosphorus:  All acceptable  · PTH intact:  less than 6 3 from prior value    · Vitamin-D:  35:  Continue vitamin-D 2000 units daily; recheck next visit  6   Dyslipidemia:  · Goal LDL:  Less than 100  · Current lipid profile:  LDL very low/HDL 30/triglycerides 307 from prior values  Recommendations:  Per Endocrinology  7   Anemia:  Hemoglobin acceptable 11 8  8   Other problems:  · Depression  · Diabetes mellitus per primary medical physician  · Hypothyroidism  · BARBARA on CPAP  · Fibromyalgia/osteoarthritis  · Nephrolithiasis  · Basal cell/squamous CA of the skin  · Urinary stress incontinence  · Status post incisional hernia repairs       PATIENT INSTRUCTIONS:    Patient Instructions   1  Medication changes today:  · Please stop chlorthalidone  · Please begin torsemide 5 mg daily watch for cramps or lightheadedness and call me if you developed either of these symptoms or any other unusual symptom  2  Please wait 1-2 weeks after making the above medication change and go for nonfasting lab work any time of the day     3  Please wait 2 weeks after making the above medication change in take 1 week a blood pressure readings morning evening just sitting as follows:  · Take the morning readings before any medications  · Take the evening readings before supper  · When taking standing readings, keep your arm supported at heart level and not dangling  · Make sure you are sitting with your back supported in feet on the ground on crossed  · Make sure you have not taken any coffee or caffeine products or exercised or smoke cigarettes at least 30 minutes before taking your blood pressure  THEN PLEASE BRING IN YOUR BLOOD PRESSURE MACHINE ALONG WITH YOUR BLOOD PRESSURE READINGS TO SEE 1 OF MY ADVANCED PRACTITIONER'S IN APPROXIMATELY 3 WEEKS  4  Follow-up in 4 months:  -please bring in 1 week a blood pressure readings morning evening, sitting and standing is outlined above  -please go for nonfasting lab work but in the morning prior to your appointment    5  General instructions:   AVOID SALT BUT NOT ADDING AN READING LABELS TO MAKE SURE THERE IS LOW-SALT IN THE FOOD THAT YOU ARE EATING     Avoid nonsteroidal anti-inflammatory drugs such as Naprosyn, ibuprofen, Aleve, Advil, Celebrex, etc   You can use Tylenol as needed if you do not have any liver condition to be concerned about     Avoid medications such as Sudafed or decongestants and antihistamines that contained the D component which is the decongestant  You can take antihistamines without the decongestant or D component   Try to avoid medications such as pantoprazole or  Protonix/Nexium or Esomeprazole)/Prilosec or omeprazole/Prevacid or lansoprazole/AcipHex or Rabeprazole  If you are able to, use Pepcid as this is safer for your kidneys   Try to exercise at least 30 minutes 3 days a week to begin with with an ultimate goal of 5 days a week for at least 30 minutes     Try to lose 5-10 lb by your next visit     Please do not drink more than 2 glasses of alcohol/wine on a daily basis as this may contribute to your high blood pressure  Subjective:   Status post incisional hernia repair about 3 months ago went well  Sebaceous cyst incised and drained complicated by an abscess being treated currently  Just local packing at this time  Otherwise currently feeling well except for the following:  -mild and only intermittent numbness of the left part of her tongue but no tenderness soreness or lesions according to the patient  -slightly more swelling of her legs since we decreased her diuretic    No fevers chills cough or colds except for nasal congestion associated with postnasal drip/allergies  No nausea vomiting or diarrhea  No chest pain or shortness of breath  No headaches, dizziness or lightheadedness  No active urinary symptoms including foamy urine  Current blood pressure medications:  · Metoprolol 50 mg twice a day  · Losartan 50 mg daily in the morning  · Chlorthalidone 12 5 mg every other day  · Amlodipine 7 5 mg daily in the evening    ROS:  See HPI, otherwise review of systems as completely reviewed with the patient are negative    Past Medical History:   Diagnosis Date    Anxiety     Arthritis     Diabetes mellitus ([facility] [address] [facility]     Family history of thyroid problem     Heart burn     Hyperplasia, parathyroid ([facility] [address] [facility]     Hypertension     Kidney problem     Seasonal allergies     Sleep apnea     Swollen ankles      Past Surgical History:   Procedure Laterality Date    ARTHROSCOPY KNEE      BREAST BIOPSY      CHOLECYSTECTOMY      HIATAL HERNIA REPAIR      LIPOSUCTION      REDUCTION MAMMAPLASTY      SQUAMOUS CELL CARCINOMA EXCISION      TOE SURGERY      TONSILLECTOMY      TUBAL LIGATION       Family History   Problem Relation Age of Onset    Heart disease Mother     Heart disease Father    [de-identified] Cancer Father         lung cancer      reports that she quit smoking about 43 years ago  Her smoking use included cigarettes  She has a 20 00 pack-year smoking history  She has never used smokeless tobacco  She reports that she drinks alcohol  She reports that she does not use drugs  I COMPLETELY REVIEWED THE PAST MEDICAL HISTORY/PAST SURGICAL HISTORY/SOCIAL HISTORY/FAMILY HISTORY/AND MEDICATIONS  AND UPDATED ALL    Objective:     Vitals:   Vitals:    11/07/19 0958   BP: 140/72   Pulse: 58    BP sitting on right:  160/78 with a heart rate of 64 regular  BP standing on right:  160/66 with a heart rate of 64 and regular    Weight (last 2 days)     Date/Time   Weight    11/07/19 0958   79 7 (175 8)            Wt Readings from Last 3 Encounters:   11/07/19 79 7 kg (175 lb 12 8 oz)   09/20/19 76 2 kg (168 lb)   07/31/19 76 3 kg (168 lb 3 2 oz)       Body mass index is 30 18 kg/m²      Physical Exam: General:  Obese, No acute distress  Skin:  No acute rash  Eyes:  No scleral icterus, noninjected  ENT:  Moist mucous membranes  Neck:  Supple, no jugular venous distention  Back   No CVAT; there is a bandage in the mid back over the sebaceous cyst area which is at the moment dry and clean  Chest:  Clear to auscultation and percussion, good respiratory effort  CVS:  Regular rate and rhythm without a rub, murmurs or gallops  Abdomen:  Obese, Soft and nontender with normal bowel sounds; incision looks well healed  Extremities:  No cyanosis and no edema  Neuro:  Grossly intact  Psych:  Alert, oriented x3 and appropriate      Medications:    Current Outpatient Medications:     albuterol (VENTOLIN HFA) 90 mcg/act inhaler, Ventolin HFA 90 mcg/actuation aerosol inhaler, Disp: , Rfl:     amLODIPine (NORVASC) 5 mg tablet, Take one and one half tablets daily, Disp: 135 tablet, Rfl: 3    b complex vitamins tablet, Take 1 tablet by mouth daily , Disp: , Rfl:     Calcium Carbonate (CALCIUM 600 PO), Take 1 capsule by mouth 2 (two) times a day, Disp: , Rfl:     DULoxetine (CYMBALTA) 60 mg delayed release capsule, Take 60 mg by mouth daily, Disp: , Rfl:     ezetimibe (ZETIA) 10 mg tablet, Take 10 mg by mouth daily , Disp: , Rfl:     fenofibrate (TRICOR) 145 mg tablet, Take 145 mg by mouth daily, Disp: , Rfl:     gabapentin (NEURONTIN) 300 mg capsule, Take 300 mg by mouth Take 300 mg with dinner and 600mg at bedtime , Disp: , Rfl:     glimepiride (AMARYL) 2 mg tablet, Take 4 mg by mouth 2 (two) times a day , Disp: , Rfl:     insulin detemir (LEVEMIR) 100 units/mL subcutaneous injection, Inject 30 Units under the skin every morning , Disp: , Rfl:     levothyroxine 175 mcg tablet, Take 175 mcg by mouth daily, Disp: , Rfl:     losartan (COZAAR) 50 mg tablet, Take 50 mg by mouth daily , Disp: , Rfl:     Magnesium 500 MG CAPS, Take 1 capsule by mouth 3 (three) times a day, Disp: , Rfl:     methocarbamol (ROBAXIN) 500 mg tablet, Take 500 mg by mouth daily at bedtime as needed, Disp: , Rfl: 2    metoprolol tartrate (LOPRESSOR) 50 mg tablet, Take 50 mg by mouth every 12 (twelve) hours  , Disp: , Rfl:     Multiple Vitamin (MULTIVITAMIN) capsule, Take 1 capsule by mouth daily, Disp: , Rfl:     NOVOLOG FLEXPEN 100 units/mL injection pen, PLEASE SEE ATTACHED FOR DETAILED DIRECTIONS, Disp: , Rfl: 0    Omega-3 Fatty Acids (FISH OIL) 1,000 mg, Take 1,000 mg by mouth daily, Disp: , Rfl:     omeprazole (PriLOSEC) 40 MG capsule, Take 40 mg by mouth daily , Disp: , Rfl:     pioglitazone (ACTOS) 30 mg tablet, Take 30 mg by mouth daily, Disp: , Rfl:     pramipexole (MIRAPEX) 0 25 mg tablet, Take 0 25 mg by mouth daily, Disp: , Rfl:     rosuvastatin (CRESTOR) 5 mg tablet, Take 5 mg by mouth daily, Disp: , Rfl:     Vitamin D, Ergocalciferol, 2000 units CAPS, Take 2,000 Units by mouth daily , Disp: , Rfl:     vitamin E, tocopherol, 1,000 units capsule, Take 2,000 Units by mouth daily , Disp: , Rfl:     B-D ULTRAFINE III SHORT PEN 31G X 8 MM MISC, USE AS DIRECTED 4 TIMES PER DAY, Disp: , Rfl: 3    cyclobenzaprine (FLEXERIL) 10 mg tablet, Take 10 mg by mouth daily, Disp: , Rfl:     Dulaglutide (TRULICITY) 7 10 CZ/2 7NR SOPN, Inject 0 75 mg under the skin once a week, Disp: , Rfl:     ergocalciferol (VITAMIN D2) 50,000 units, Take 1 capsule (50,000 Units total) by mouth once a week for 12 doses, Disp: 12 capsule, Rfl: 0    METHOCARBAMOL PO, Take by mouth daily at bedtime, Disp: , Rfl:     omega-3-acid ethyl esters (LOVAZA) 1 g capsule, , Disp: , Rfl:     torsemide (DEMADEX) 5 MG tablet, Take 1 tablet (5 mg total) by mouth daily, Disp: 30 tablet, Rfl: 5    Lab, Imaging and other studies: I have personally reviewed pertinent labs  Laboratory Results:  Results for orders placed or performed in visit on 11/06/19   Comprehensive metabolic panel   Result Value Ref Range    SODIUM 142     POTASSIUM 4 2     CHLORIDE 106     CO2 28     BUN 41     CREATININE 1 72     Glucose 147     EXTERNAL CALCIUM 9 3     TOTAL PROTEIN 6 3     ALBUMIN 3 6     AST 20     ALT 28     ALK PHOS 51     EXTERNAL TOT   BILIRUBIN 0 3     EXTERNAL EGFR 30     Total CK 53 26 - 192 U/L    Prot/Creat Ratio, Ur 0 53 (A) 0 00 - 0 10    Magnesium 2 1 1 6 - 2 6 mg/dL    Phosphorus 3 9 2 3 - 4 1 mg/dL    Hemoglobin 11 8 11 5 - 15 4 g/dL    Hematocrit 35 6 34 8 - 46 1 %    WBC 6 50 Thousand/uL    Platelets 309 799 - 637 Thousands/uL       Results from last 7 days   Lab Units 11/04/19   WBC Thousand/uL 6 50   HEMOGLOBIN g/dL 11 8   HEMATOCRIT % 35 6   PLATELETS Thousands/uL 268   POTASSIUM  4 2   CHLORIDE  106   CO2  28   BUN  41   CREATININE  1 72   CALCIUM  9 3   MAGNESIUM mg/dL 2 1   PHOSPHORUS mg/dL 3 9         Radiology review:   chest X-ray    Ultrasound      Portions of the record may have been created with voice recognition software  Occasional wrong word or "sound a like" substitutions may have occurred due to the inherent limitations of voice recognition software  Read the chart carefully and recognize, using context, where substitutions have occurred

## 2019-11-07 ENCOUNTER — OFFICE VISIT (OUTPATIENT)
Dept: NEPHROLOGY | Facility: CLINIC | Age: 69
End: 2019-11-07
Payer: MEDICARE

## 2019-11-07 VITALS
WEIGHT: 175.8 LBS | SYSTOLIC BLOOD PRESSURE: 140 MMHG | HEART RATE: 58 BPM | BODY MASS INDEX: 30.01 KG/M2 | DIASTOLIC BLOOD PRESSURE: 72 MMHG | HEIGHT: 64 IN

## 2019-11-07 DIAGNOSIS — R80.1 PERSISTENT PROTEINURIA: ICD-10-CM

## 2019-11-07 DIAGNOSIS — I12.9 HYPERTENSIVE CHRONIC KIDNEY DISEASE WITH STAGE 1 THROUGH STAGE 4 CHRONIC KIDNEY DISEASE, OR UNSPECIFIED CHRONIC KIDNEY DISEASE: Primary | ICD-10-CM

## 2019-11-07 DIAGNOSIS — N18.30 CHRONIC KIDNEY DISEASE, STAGE III (MODERATE) (HCC): ICD-10-CM

## 2019-11-07 DIAGNOSIS — E11.21 DIABETIC NEPHROPATHY ASSOCIATED WITH TYPE 2 DIABETES MELLITUS (HCC): ICD-10-CM

## 2019-11-07 PROCEDURE — 99214 OFFICE O/P EST MOD 30 MIN: CPT | Performed by: INTERNAL MEDICINE

## 2019-11-07 RX ORDER — CHLORAL HYDRATE 500 MG
1000 CAPSULE ORAL DAILY
COMMUNITY
End: 2020-05-05 | Stop reason: HOSPADM

## 2019-11-07 RX ORDER — TORSEMIDE 5 MG/1
5 TABLET ORAL DAILY
Qty: 30 TABLET | Refills: 5 | Status: SHIPPED | OUTPATIENT
Start: 2019-11-07 | End: 2020-03-09

## 2019-11-07 RX ORDER — PIOGLITAZONEHYDROCHLORIDE 30 MG/1
30 TABLET ORAL DAILY
COMMUNITY
End: 2020-05-05 | Stop reason: HOSPADM

## 2019-11-07 NOTE — PATIENT INSTRUCTIONS
1  Medication changes today:  · Please stop chlorthalidone  · Please begin torsemide 5 mg daily watch for cramps or lightheadedness and call me if you developed either of these symptoms or any other unusual symptom  2  Please wait 1-2 weeks after making the above medication change and go for nonfasting lab work any time of the day     3  Please wait 2 weeks after making the above medication change in take 1 week a blood pressure readings morning evening just sitting as follows:  · Take the morning readings before any medications  · Take the evening readings before supper  · When taking standing readings, keep your arm supported at heart level and not dangling  · Make sure you are sitting with your back supported in feet on the ground on crossed  · Make sure you have not taken any coffee or caffeine products or exercised or smoke cigarettes at least 30 minutes before taking your blood pressure  THEN PLEASE BRING IN YOUR BLOOD PRESSURE MACHINE ALONG WITH YOUR BLOOD PRESSURE READINGS TO SEE 1 OF MY ADVANCED PRACTITIONER'S IN APPROXIMATELY 3 WEEKS  4  Follow-up in 4 months:  -please bring in 1 week a blood pressure readings morning evening, sitting and standing is outlined above  -please go for nonfasting lab work but in the morning prior to your appointment    5  General instructions:   AVOID SALT BUT NOT ADDING AN READING LABELS TO MAKE SURE THERE IS LOW-SALT IN THE FOOD THAT YOU ARE EATING     Avoid nonsteroidal anti-inflammatory drugs such as Naprosyn, ibuprofen, Aleve, Advil, Celebrex, etc   You can use Tylenol as needed if you do not have any liver condition to be concerned about     Avoid medications such as Sudafed or decongestants and antihistamines that contained the D component which is the decongestant  You can take antihistamines without the decongestant or D component       Try to avoid medications such as pantoprazole or  Protonix/Nexium or Esomeprazole)/Prilosec or omeprazole/Prevacid or lansoprazole/AcipHex or Rabeprazole  If you are able to, use Pepcid as this is safer for your kidneys   Try to exercise at least 30 minutes 3 days a week to begin with with an ultimate goal of 5 days a week for at least 30 minutes     Try to lose 5-10 lb by your next visit     Please do not drink more than 2 glasses of alcohol/wine on a daily basis as this may contribute to your high blood pressure

## 2019-11-07 NOTE — LETTER
November 7, 2019     Augusta Huynh, [address] [place] [address]     Patient: Pj Presley   YOB: 1950   Date of Visit: 11/7/2019       Dear Dr Castellanos Nguyen:    Thank you for referring Pj Presley to me for evaluation  Below are my notes for this consultation  If you have questions, please do not hesitate to call me  I look forward to following your patient along with you  Sincerely,        Melissa Saucedo MD        CC: DO Naseem Vargas MD Glo Lorenzo, MD Jacqulyne Bunch, MD  11/7/2019 10:43 AM  Sign at close encounter  RENAL FOLLOW UP NOTE: td    ASSESSMENT AND PLAN:  1  Michael Lee WBMACEY 3 :  · Etiology:  Diabetic nephropathy/hypertensive nephrosclerosis/arteriolar nephrosclerosis/chronic NSAID use   No evidence of obstructive uropathy, renal artery disease or primary glomerular disease with a bland urinalysis  Of note, CPK was normal at 105 no evidence rhabdomyolysis  · Baseline creatinine:  1 5-2 0  · Current creatinine:   1  72 at baseline  · Urine protein creatinine ratio:  0 53 g at goal  Recommendations:  · Treat hypertension-please see below  · Treat dyslipidemia-please see below  · Maintain proteinuria less than 1 g or as low as possible  · Avoid nephrotoxic agents such as NSAIDs, patient counseled as such  2   Volume:  Euvolemic  3   Hypertension:  Workup:  · saline suppression test for primary aldosterone state was normal  · plasma free metanephrines was negative  · renal artery duplex was negative 02/2019       Current blood pressure averages:   none today  · Goal blood pressure:  less than 130/80  Recommendations:  · Push nonmedical regimen including weight loss, isotonic exercise and avoidance of salt; patient counseled as such  · Medication changes today:  The patient's blood pressure is elevated today  Based on her swelling and preference of taking the medication daily I would switch her chlorthalidone to torsemide 5 mg daily    Then I would have her take 1 week a blood pressure readings about 1-2 weeks after initiating this new medication  I of course will check another basic metabolic profile  4   Electrolytes:  all acceptable including a potassium of 4 2 which overall is improved  5   Mineral bone disorder:  · Calcium/magnesium/phosphorus:  All acceptable  · PTH intact:  less than 6 3 from prior value  · Vitamin-D:  35:  Continue vitamin-D 2000 units daily; recheck next visit  6   Dyslipidemia:  · Goal LDL:  Less than 100  · Current lipid profile:  LDL very low/HDL 30/triglycerides 307 from prior values  Recommendations:  Per Endocrinology  7   Anemia:  Hemoglobin acceptable 11 8  8   Other problems:  · Depression  · Diabetes mellitus per primary medical physician  · Hypothyroidism  · BARBARA on CPAP  · Fibromyalgia/osteoarthritis  · Nephrolithiasis  · Basal cell/squamous CA of the skin  · Urinary stress incontinence  · Status post incisional hernia repairs       PATIENT INSTRUCTIONS:    Patient Instructions   1  Medication changes today:  · Please stop chlorthalidone  · Please begin torsemide 5 mg daily watch for cramps or lightheadedness and call me if you developed either of these symptoms or any other unusual symptom  2  Please wait 1-2 weeks after making the above medication change and go for nonfasting lab work any time of the day     3   Please wait 2 weeks after making the above medication change in take 1 week a blood pressure readings morning evening just sitting as follows:  · Take the morning readings before any medications  · Take the evening readings before supper  · When taking standing readings, keep your arm supported at heart level and not dangling  · Make sure you are sitting with your back supported in feet on the ground on crossed  · Make sure you have not taken any coffee or caffeine products or exercised or smoke cigarettes at least 30 minutes before taking your blood pressure  THEN PLEASE BRING IN YOUR BLOOD PRESSURE MACHINE ALONG WITH YOUR BLOOD PRESSURE READINGS TO SEE 1 OF MY ADVANCED PRACTITIONER'S IN APPROXIMATELY 3 WEEKS  4  Follow-up in 4 months:  -please bring in 1 week a blood pressure readings morning evening, sitting and standing is outlined above  -please go for nonfasting lab work but in the morning prior to your appointment    5  General instructions:   AVOID SALT BUT NOT ADDING AN READING LABELS TO MAKE SURE THERE IS LOW-SALT IN THE FOOD THAT YOU ARE EATING     Avoid nonsteroidal anti-inflammatory drugs such as Naprosyn, ibuprofen, Aleve, Advil, Celebrex, etc   You can use Tylenol as needed if you do not have any liver condition to be concerned about     Avoid medications such as Sudafed or decongestants and antihistamines that contained the D component which is the decongestant  You can take antihistamines without the decongestant or D component   Try to avoid medications such as pantoprazole or  Protonix/Nexium or Esomeprazole)/Prilosec or omeprazole/Prevacid or lansoprazole/AcipHex or Rabeprazole  If you are able to, use Pepcid as this is safer for your kidneys   Try to exercise at least 30 minutes 3 days a week to begin with with an ultimate goal of 5 days a week for at least 30 minutes     Try to lose 5-10 lb by your next visit     Please do not drink more than 2 glasses of alcohol/wine on a daily basis as this may contribute to your high blood pressure  Subjective:   Status post incisional hernia repair about 3 months ago went well  Sebaceous cyst incised and drained complicated by an abscess being treated currently  Just local packing at this time  Otherwise currently feeling well except for the following:  -mild and only intermittent numbness of the left part of her tongue but no tenderness soreness or lesions according to the patient  -slightly more swelling of her legs since we decreased her diuretic    No fevers chills cough or colds except for nasal congestion associated with postnasal drip/allergies  No nausea vomiting or diarrhea  No chest pain or shortness of breath  No headaches, dizziness or lightheadedness  No active urinary symptoms including foamy urine  Current blood pressure medications:  · Metoprolol 50 mg twice a day  · Losartan 50 mg daily in the morning  · Chlorthalidone 12 5 mg every other day  · Amlodipine 7 5 mg daily in the evening    ROS:  See HPI, otherwise review of systems as completely reviewed with the patient are negative    Past Medical History:   Diagnosis Date    Anxiety     Arthritis     Diabetes mellitus ([facility]     Family history of thyroid problem     Heart burn     Hyperplasia, parathyroid ([facility]     Hypertension     Kidney problem     Seasonal allergies     Sleep apnea     Swollen ankles      Past Surgical History:   Procedure Laterality Date    ARTHROSCOPY KNEE      BREAST BIOPSY      CHOLECYSTECTOMY      HIATAL HERNIA REPAIR      LIPOSUCTION      REDUCTION MAMMAPLASTY      SQUAMOUS CELL CARCINOMA EXCISION      TOE SURGERY      TONSILLECTOMY      TUBAL LIGATION       Family History   Problem Relation Age of Onset    Heart disease Mother     Heart disease Father     Cancer Father         lung cancer      reports that she quit smoking about 43 years ago  Her smoking use included cigarettes  She has a 20 00 pack-year smoking history  She has never used smokeless tobacco  She reports that she drinks alcohol  She reports that she does not use drugs      I COMPLETELY REVIEWED THE PAST MEDICAL HISTORY/PAST SURGICAL HISTORY/SOCIAL HISTORY/FAMILY HISTORY/AND MEDICATIONS  AND UPDATED ALL    Objective:     Vitals:   Vitals:    11/07/19 0958   BP: 140/72   Pulse: 58    BP sitting on right:  160/78 with a heart rate of 64 regular  BP standing on right:  160/66 with a heart rate of 64 and regular    Weight (last 2 days)     Date/Time   Weight    11/07/19 0958   79 7 (175 8)            Wt Readings from Last 3 Encounters:   11/07/19 79 7 kg (175 lb 12 8 oz) 09/20/19 76 2 kg (168 lb)   07/31/19 76 3 kg (168 lb 3 2 oz)       Body mass index is 30 18 kg/m²      Physical Exam: General:  Obese, No acute distress  Skin:  No acute rash  Eyes:  No scleral icterus, noninjected  ENT:  Moist mucous membranes  Neck:  Supple, no jugular venous distention  Back   No CVAT; there is a bandage in the mid back over the sebaceous cyst area which is at the moment dry and clean  Chest:  Clear to auscultation and percussion, good respiratory effort  CVS:  Regular rate and rhythm without a rub, murmurs or gallops  Abdomen:  Obese, Soft and nontender with normal bowel sounds; incision looks well healed  Extremities:  No cyanosis and no edema  Neuro:  Grossly intact  Psych:  Alert, oriented x3 and appropriate      Medications:    Current Outpatient Medications:     albuterol (VENTOLIN HFA) 90 mcg/act inhaler, Ventolin HFA 90 mcg/actuation aerosol inhaler, Disp: , Rfl:     amLODIPine (NORVASC) 5 mg tablet, Take one and one half tablets daily, Disp: 135 tablet, Rfl: 3    b complex vitamins tablet, Take 1 tablet by mouth daily , Disp: , Rfl:     Calcium Carbonate (CALCIUM 600 PO), Take 1 capsule by mouth 2 (two) times a day, Disp: , Rfl:     DULoxetine (CYMBALTA) 60 mg delayed release capsule, Take 60 mg by mouth daily, Disp: , Rfl:     ezetimibe (ZETIA) 10 mg tablet, Take 10 mg by mouth daily , Disp: , Rfl:     fenofibrate (TRICOR) 145 mg tablet, Take 145 mg by mouth daily, Disp: , Rfl:     gabapentin (NEURONTIN) 300 mg capsule, Take 300 mg by mouth Take 300 mg with dinner and 600mg at bedtime , Disp: , Rfl:     glimepiride (AMARYL) 2 mg tablet, Take 4 mg by mouth 2 (two) times a day , Disp: , Rfl:     insulin detemir (LEVEMIR) 100 units/mL subcutaneous injection, Inject 30 Units under the skin every morning , Disp: , Rfl:     levothyroxine 175 mcg tablet, Take 175 mcg by mouth daily, Disp: , Rfl:     losartan (COZAAR) 50 mg tablet, Take 50 mg by mouth daily , Disp: , Rfl:    Magnesium 500 MG CAPS, Take 1 capsule by mouth 3 (three) times a day, Disp: , Rfl:     methocarbamol (ROBAXIN) 500 mg tablet, Take 500 mg by mouth daily at bedtime as needed, Disp: , Rfl: 2    metoprolol tartrate (LOPRESSOR) 50 mg tablet, Take 50 mg by mouth every 12 (twelve) hours  , Disp: , Rfl:     Multiple Vitamin (MULTIVITAMIN) capsule, Take 1 capsule by mouth daily, Disp: , Rfl:     NOVOLOG FLEXPEN 100 units/mL injection pen, PLEASE SEE ATTACHED FOR DETAILED DIRECTIONS, Disp: , Rfl: 0    Omega-3 Fatty Acids (FISH OIL) 1,000 mg, Take 1,000 mg by mouth daily, Disp: , Rfl:     omeprazole (PriLOSEC) 40 MG capsule, Take 40 mg by mouth daily , Disp: , Rfl:     pioglitazone (ACTOS) 30 mg tablet, Take 30 mg by mouth daily, Disp: , Rfl:     pramipexole (MIRAPEX) 0 25 mg tablet, Take 0 25 mg by mouth daily, Disp: , Rfl:     rosuvastatin (CRESTOR) 5 mg tablet, Take 5 mg by mouth daily, Disp: , Rfl:     Vitamin D, Ergocalciferol, 2000 units CAPS, Take 2,000 Units by mouth daily , Disp: , Rfl:     vitamin E, tocopherol, 1,000 units capsule, Take 2,000 Units by mouth daily , Disp: , Rfl:     B-D ULTRAFINE III SHORT PEN 31G X 8 MM MISC, USE AS DIRECTED 4 TIMES PER DAY, Disp: , Rfl: 3    cyclobenzaprine (FLEXERIL) 10 mg tablet, Take 10 mg by mouth daily, Disp: , Rfl:     Dulaglutide (TRULICITY) 3 60 AF/0 4JB SOPN, Inject 0 75 mg under the skin once a week, Disp: , Rfl:     ergocalciferol (VITAMIN D2) 50,000 units, Take 1 capsule (50,000 Units total) by mouth once a week for 12 doses, Disp: 12 capsule, Rfl: 0    METHOCARBAMOL PO, Take by mouth daily at bedtime, Disp: , Rfl:     omega-3-acid ethyl esters (LOVAZA) 1 g capsule, , Disp: , Rfl:     torsemide (DEMADEX) 5 MG tablet, Take 1 tablet (5 mg total) by mouth daily, Disp: 30 tablet, Rfl: 5    Lab, Imaging and other studies: I have personally reviewed pertinent labs    Laboratory Results:  Results for orders placed or performed in visit on 11/06/19 Comprehensive metabolic panel   Result Value Ref Range    SODIUM 142     POTASSIUM 4 2     CHLORIDE 106     CO2 28     BUN 41     CREATININE 1 72     Glucose 147     EXTERNAL CALCIUM 9 3     TOTAL PROTEIN 6 3     ALBUMIN 3 6     AST 20     ALT 28     ALK PHOS 51     EXTERNAL TOT  BILIRUBIN 0 3     EXTERNAL EGFR 30     Total CK 53 26 - 192 U/L    Prot/Creat Ratio, Ur 0 53 (A) 0 00 - 0 10    Magnesium 2 1 1 6 - 2 6 mg/dL    Phosphorus 3 9 2 3 - 4 1 mg/dL    Hemoglobin 11 8 11 5 - 15 4 g/dL    Hematocrit 35 6 34 8 - 46 1 %    WBC 6 50 Thousand/uL    Platelets 345 064 - 876 Thousands/uL       Results from last 7 days   Lab Units 11/04/19   WBC Thousand/uL 6 50   HEMOGLOBIN g/dL 11 8   HEMATOCRIT % 35 6   PLATELETS Thousands/uL 268   POTASSIUM  4 2   CHLORIDE  106   CO2  28   BUN  41   CREATININE  1 72   CALCIUM  9 3   MAGNESIUM mg/dL 2 1   PHOSPHORUS mg/dL 3 9         Radiology review:   chest X-ray    Ultrasound      Portions of the record may have been created with voice recognition software  Occasional wrong word or "sound a like" substitutions may have occurred due to the inherent limitations of voice recognition software  Read the chart carefully and recognize, using context, where substitutions have occurred

## 2019-11-13 ENCOUNTER — OFFICE VISIT (OUTPATIENT)
Dept: CARDIOLOGY CLINIC | Facility: CLINIC | Age: 69
End: 2019-11-13
Payer: MEDICARE

## 2019-11-13 VITALS
WEIGHT: 176.8 LBS | HEIGHT: 64 IN | OXYGEN SATURATION: 96 % | HEART RATE: 72 BPM | BODY MASS INDEX: 30.19 KG/M2 | DIASTOLIC BLOOD PRESSURE: 70 MMHG | SYSTOLIC BLOOD PRESSURE: 150 MMHG

## 2019-11-13 DIAGNOSIS — E78.2 MIXED HYPERLIPIDEMIA: Primary | ICD-10-CM

## 2019-11-13 DIAGNOSIS — I10 ESSENTIAL HYPERTENSION: ICD-10-CM

## 2019-11-13 DIAGNOSIS — R06.00 DYSPNEA ON EXERTION: ICD-10-CM

## 2019-11-13 DIAGNOSIS — I27.20 PULMONARY HYPERTENSION (HCC): ICD-10-CM

## 2019-11-13 PROCEDURE — 99214 OFFICE O/P EST MOD 30 MIN: CPT | Performed by: INTERNAL MEDICINE

## 2019-11-13 RX ORDER — COLESEVELAM 180 1/1
1875 TABLET ORAL 2 TIMES DAILY WITH MEALS
Qty: 180 TABLET | Refills: 3 | Status: SHIPPED | OUTPATIENT
Start: 2019-11-13 | End: 2020-02-20 | Stop reason: ALTCHOICE

## 2019-11-13 NOTE — PATIENT INSTRUCTIONS
Chronic Hypertension   AMBULATORY CARE:   Hypertension  is high blood pressure (BP)  Your BP is the force of your blood moving against the walls of your arteries  Normal BP is less than 120/80  Prehypertension is between 120/80 and 139/89  Hypertension is 140/90 or higher  Hypertension causes your BP to get so high that your heart has to work much harder than normal  This can damage your heart  Chronic hypertension is a long-term condition that you can control with a healthy lifestyle or medicines  A controlled blood pressure helps protect your organs, such as your heart, lungs, brain, and kidneys  Common symptoms include the following:   · Headache     · Blurred vision    · Chest pain     · Dizziness or weakness     · Trouble breathing     · Nosebleeds  Call 911 for any of the following:   · You have discomfort in your chest that feels like squeezing, pressure, fullness, or pain  · You become confused or have difficulty speaking  · You suddenly feel lightheaded or have trouble breathing  · You have pain or discomfort in your back, neck, jaw, stomach, or arm  Seek care immediately if:   · You have a severe headache or vision loss  · You have weakness in an arm or leg  Contact your healthcare provider if:   · You feel faint, dizzy, confused, or drowsy  · You have been taking your BP medicine and your BP is still higher than your healthcare provider says it should be  · You have questions or concerns about your condition or care  Treatment for chronic hypertension  may include medicine to lower your BP and lower your cholesterol level  A low cholesterol level helps prevent heart disease and makes it easier to control your blood pressure  Heart disease can make your blood pressure harder to control  You may also need to make lifestyle changes  Take your medicine exactly as directed    Manage chronic hypertension:  Talk with your healthcare provider about these and other ways to manage hypertension:  · Take your BP at home  Sit and rest for 5 minutes before you take your BP  Extend your arm and support it on a flat surface  Your arm should be at the same level as your heart  Follow the directions that came with your BP monitor  If possible, take at least 2 BP readings each time  Take your BP at least twice a day at the same times each day, such as morning and evening  Keep a record of your BP readings and bring it to your follow-up visits  Ask your healthcare provider what your blood pressure should be  · Limit sodium (salt) as directed  Too much sodium can affect your fluid balance  Check labels to find low-sodium or no-salt-added foods  Some low-sodium foods use potassium salts for flavor  Too much potassium can also cause health problems  Your healthcare provider will tell you how much sodium and potassium are safe for you to have in a day  He or she may recommend that you limit sodium to 2,300 mg a day  · Follow the meal plan recommended by your healthcare provider  A dietitian or your provider can give you more information on low-sodium plans or the DASH (Dietary Approaches to Stop Hypertension) eating plan  The DASH plan is low in sodium, unhealthy fats, and total fat  It is high in potassium, calcium, and fiber  · Exercise to maintain a healthy weight  Exercise at least 30 minutes per day, on most days of the week  This will help decrease your blood pressure  Ask about the best exercise plan for you  · Decrease stress  This may help lower your BP  Learn ways to relax, such as deep breathing or listening to music  · Limit alcohol  Women should limit alcohol to 1 drink a day  Men should limit alcohol to 2 drinks a day  A drink of alcohol is 12 ounces of beer, 5 ounces of wine, or 1½ ounces of liquor  · Do not smoke  Nicotine and other chemicals in cigarettes and cigars can increase your BP and also cause lung damage   Ask your healthcare provider for information if you currently smoke and need help to quit  E-cigarettes or smokeless tobacco still contain nicotine  Talk to your healthcare provider before you use these products  Follow up with your healthcare provider as directed: You will need to return to have your BP checked and to have other lab tests done  Write down your questions so you remember to ask them during your visits  © 2017 [address]  Information is for End User's use only and may not be sold, redistributed or otherwise used for commercial purposes  All illustrations and images included in CareNotes® are the copyrighted property of [employer]  or [employer]  The above information is an  only  It is not intended as medical advice for individual conditions or treatments  Talk to your doctor, nurse or pharmacist before following any medical regimen to see if it is safe and effective for you

## 2019-11-13 NOTE — LETTER
November 13, 2019     Referral [facility]    Patient: Manan Wall   YOB: 1950   Date of Visit: 11/13/2019       Dear Dr Petty Champ:    Thank you for referring Manan Wall to me for evaluation  Below are my notes for this consultation  If you have questions, please do not hesitate to call me  I look forward to following your patient along with you  Sincerely,        Gonzalez Monroy MD        CC: DO Gonzalez Rae MD  11/13/2019 10:05 AM  Incomplete                                             Cardiology Consultation     Manan Wall  [id number]  1950  [address] [facility]  [address] [id number]    1  Mixed hyperlipidemia     2  Pulmonary hypertension ([facility] )     3  Dyspnea on exertion     4  Essential hypertension       Chief Complaint   Patient presents with    Follow-up     4 weeks    Migraine     couple days ago     HPI: Patient is here for continued cardiac evaluation of RAGSDALE and palpitations that also occur with exertion  Has been going on for at least the past 6 months  Has not necessarily been getting progressively worse  she has had a few surgeries, and so she has been unable to push herself  She is no longer in a boot for her RLE  Has bilateral LE edema and increased fatigue over the past 6 months as well  No reported chest pain,  lightheadedness, syncope, orthopnea, PND, or significant weight changes  No significant change to symptoms since last visit      Patient Active Problem List   Diagnosis    Chronic kidney disease, stage III (moderate) (HCC)    Hypertensive chronic kidney disease with stage 1 through stage 4 chronic kidney disease, or unspecified chronic kidney disease    BARBARA on CPAP    RLS (restless legs syndrome)    Persistent proteinuria    Diabetic nephropathy associated with type 2 diabetes mellitus ([address] )    Pulmonary hypertension ([facility] [address] )    Dyspnea    Mixed hyperlipidemia    Essential hypertension     Past Medical History:   Diagnosis Date    Anxiety     Arthritis     Diabetes mellitus ([facility] )     Family history of thyroid problem     Heart burn     Hyperplasia, parathyroid ([facility] )     Hypertension     Kidney problem     Seasonal allergies     Sleep apnea     Swollen ankles      Social History     Socioeconomic History    Marital status: /Civil Union     Spouse name: Not on file    Number of children: Not on file    Years of education: Not on file    Highest education level: Not on file   Occupational History    Not on file   Social Needs    Financial resource strain: Not on file    Food insecurity:     Worry: Not on file     Inability: Not on file    Transportation needs:     Medical: Not on file     Non-medical: Not on file   Tobacco Use    Smoking status: Former Smoker     Packs/day: 2 00     Years: 10 00     Pack years: 20 00     Types: Cigarettes     Last attempt to quit:      Years since quittin 8    Smokeless tobacco: Never Used    Tobacco comment: quit in    Substance and Sexual Activity    Alcohol use: Yes     Frequency: Monthly or less     Drinks per session: 1 or 2     Binge frequency: Never     Comment: rare    Drug use: No    Sexual activity: Not on file   Lifestyle    Physical activity:     Days per week: Not on file     Minutes per session: Not on file    Stress: Not on file   Relationships    Social connections:     Talks on phone: Not on file     Gets together: Not on file     Attends [employer] service: Not on file     Active member of club or organization: Not on file     Attends meetings of clubs or organizations: Not on file     Relationship status: Not on file    Intimate partner violence:     Fear of current or ex partner: Not on file     Emotionally abused: Not on file     Physically abused: Not on file     Forced sexual activity: Not on file   Other Topics Concern    Not on file Social History Narrative    Not on file      Family History   Problem Relation Age of Onset    Heart disease Mother     Heart disease Father     Cancer Father         lung cancer     Past Surgical History:   Procedure Laterality Date    ARTHROSCOPY KNEE      BREAST BIOPSY      CHOLECYSTECTOMY      HIATAL HERNIA REPAIR      LIPOSUCTION      REDUCTION MAMMAPLASTY      SQUAMOUS CELL CARCINOMA EXCISION      TOE SURGERY      TONSILLECTOMY      TUBAL LIGATION         Current Outpatient Medications:     albuterol (VENTOLIN HFA) 90 mcg/act inhaler, Ventolin HFA 90 mcg/actuation aerosol inhaler, Disp: , Rfl:     amLODIPine (NORVASC) 5 mg tablet, Take one and one half tablets daily, Disp: 135 tablet, Rfl: 3    b complex vitamins tablet, Take 1 tablet by mouth daily , Disp: , Rfl:     B-D ULTRAFINE III SHORT PEN 31G X 8 MM MISC, USE AS DIRECTED 4 TIMES PER DAY, Disp: , Rfl: 3    Calcium Carbonate (CALCIUM 600 PO), Take 1 capsule by mouth 2 (two) times a day, Disp: , Rfl:     DULoxetine (CYMBALTA) 60 mg delayed release capsule, Take 60 mg by mouth daily, Disp: , Rfl:     ergocalciferol (VITAMIN D2) 50,000 units, Take 1 capsule (50,000 Units total) by mouth once a week for 12 doses, Disp: 12 capsule, Rfl: 0    ezetimibe (ZETIA) 10 mg tablet, Take 10 mg by mouth daily , Disp: , Rfl:     fenofibrate (TRICOR) 145 mg tablet, Take 145 mg by mouth daily, Disp: , Rfl:     gabapentin (NEURONTIN) 300 mg capsule, Take 300 mg by mouth Take 300 mg with dinner and 600mg at bedtime , Disp: , Rfl:     glimepiride (AMARYL) 2 mg tablet, Take 4 mg by mouth 2 (two) times a day , Disp: , Rfl:     insulin detemir (LEVEMIR) 100 units/mL subcutaneous injection, Inject 30 Units under the skin every morning , Disp: , Rfl:     levothyroxine 175 mcg tablet, Take 175 mcg by mouth daily, Disp: , Rfl:     losartan (COZAAR) 50 mg tablet, Take 50 mg by mouth daily , Disp: , Rfl:     Magnesium 500 MG CAPS, Take 1 capsule by mouth 3 (three) times a day, Disp: , Rfl:     methocarbamol (ROBAXIN) 500 mg tablet, Take 500 mg by mouth daily at bedtime as needed, Disp: , Rfl: 2    METHOCARBAMOL PO, Take by mouth daily at bedtime, Disp: , Rfl:     metoprolol tartrate (LOPRESSOR) 50 mg tablet, Take 50 mg by mouth every 12 (twelve) hours  , Disp: , Rfl:     Multiple Vitamin (MULTIVITAMIN) capsule, Take 1 capsule by mouth daily, Disp: , Rfl:     NOVOLOG FLEXPEN 100 units/mL injection pen, PLEASE SEE ATTACHED FOR DETAILED DIRECTIONS, Disp: , Rfl: 0    Omega-3 Fatty Acids (FISH OIL) 1,000 mg, Take 1,000 mg by mouth daily, Disp: , Rfl:     omega-3-acid ethyl esters (LOVAZA) 1 g capsule, , Disp: , Rfl:     omeprazole (PriLOSEC) 40 MG capsule, Take 40 mg by mouth daily , Disp: , Rfl:     pioglitazone (ACTOS) 30 mg tablet, Take 30 mg by mouth daily, Disp: , Rfl:     pramipexole (MIRAPEX) 0 25 mg tablet, Take 0 25 mg by mouth daily, Disp: , Rfl:     rosuvastatin (CRESTOR) 5 mg tablet, Take 5 mg by mouth daily, Disp: , Rfl:     torsemide (DEMADEX) 5 MG tablet, Take 1 tablet (5 mg total) by mouth daily, Disp: 30 tablet, Rfl: 5    Vitamin D, Ergocalciferol, 2000 units CAPS, Take 2,000 Units by mouth daily , Disp: , Rfl:     vitamin E, tocopherol, 1,000 units capsule, Take 2,000 Units by mouth daily , Disp: , Rfl:     cyclobenzaprine (FLEXERIL) 10 mg tablet, Take 10 mg by mouth daily, Disp: , Rfl:     Dulaglutide (TRULICITY) 3 91 SG/9 1PD SOPN, Inject 0 75 mg under the skin once a week, Disp: , Rfl:   Allergies   Allergen Reactions    Ciprofloxacin Hives     Vitals:    11/13/19 0937   BP: 150/70   BP Location: Left arm   Patient Position: Sitting   Cuff Size: Large   Pulse: 72   SpO2: 96%   Weight: 80 2 kg (176 lb 12 8 oz)   Height: 5' 4" (1 626 m)       Labs:  Documentation on 11/06/2019   Component Date Value    SODIUM 11/04/2019 142     POTASSIUM 11/04/2019 4 2     CHLORIDE 11/04/2019 106     CO2 11/04/2019 28     BUN 11/04/2019 41     CREATININE 11/04/2019 1 72     Glucose 11/04/2019 147     EXTERNAL CALCIUM 11/04/2019 9 3     TOTAL PROTEIN 11/04/2019 6 3     ALBUMIN 11/04/2019 3 6     AST 11/04/2019 20     ALT 11/04/2019 28     ALK PHOS 11/04/2019 51     EXTERNAL TOT  BILIRUBIN 11/04/2019 0 3     EXTERNAL EGFR 11/04/2019 30     Total CK 11/04/2019 53     Prot/Creat Ratio, Ur 11/04/2019 0 53*    Magnesium 11/04/2019 2 1     Phosphorus 11/04/2019 3 9     Hemoglobin 11/04/2019 11 8     Hematocrit 11/04/2019 35 6     WBC 11/04/2019 6 50     Platelets 04/39/5612 268    Orders Only on 11/04/2019   Component Date Value    PROTEIN UA 11/04/2019 32 3     EXT Creatinine Urine 11/04/2019 60 6     EXTERNAL Ur Prot/Creat R* 11/04/2019 0 53    Abstract on 07/18/2019   Component Date Value    SODIUM 07/10/2019 139     POTASSIUM 07/10/2019 4 2     CHLORIDE 07/10/2019 104     CO2 07/10/2019 28     CREATININE 07/10/2019 1 43     Glucose 07/10/2019 146     EXTERNAL CALCIUM 07/10/2019 9 7     EXTERNAL EGFR 07/10/2019 37    Orders Only on 05/23/2019   Component Date Value    PROTEIN UA 05/23/2019 33 9     EXT Creatinine Urine 05/23/2019 45 5     EXTERNAL Ur Prot/Creat R* 05/23/2019 0 75    Orders Only on 05/17/2019   Component Date Value    Hemoglobin A1C 05/17/2019 8 5     EXT Creatinine Urine 05/17/2019 74 0     Microalbum  ,U,Random 05/17/2019 37 4     EXTERNAL Microalb/Creat * 05/17/2019 505 4      No results found for: CHOL, TRIG, HDL, LDLDIRECT  Imaging: No results found  Review of Systems:  Review of Systems   Constitutional: Negative for activity change, appetite change, chills, diaphoresis, fatigue and unexpected weight change  HENT: Negative for hearing loss, nosebleeds and sore throat  Eyes: Negative for photophobia and visual disturbance  Respiratory: Positive for shortness of breath  Negative for cough, chest tightness and wheezing  Cardiovascular: Positive for palpitations   Negative for chest pain and leg swelling  Gastrointestinal: Negative for abdominal pain, diarrhea, nausea and vomiting  Endocrine: Negative for polyuria  Genitourinary: Negative for dysuria, frequency and hematuria  Musculoskeletal: Negative for arthralgias, back pain, gait problem and neck pain  Skin: Negative for pallor and rash  Neurological: Negative for dizziness, syncope and headaches  Hematological: Does not bruise/bleed easily  Psychiatric/Behavioral: Negative for behavioral problems and confusion  Physical Exam:  Physical Exam   Constitutional: She is oriented to person, place, and time  She appears well-developed and well-nourished  HENT:   Head: Normocephalic and atraumatic  Nose: Nose normal    Eyes: Pupils are equal, round, and reactive to light  EOM are normal  No scleral icterus  Neck: Normal range of motion  Neck supple  No JVD present  Cardiovascular: Normal rate, regular rhythm and normal heart sounds  Exam reveals no gallop and no friction rub  No murmur heard  Pulmonary/Chest: Effort normal and breath sounds normal  No respiratory distress  She has no wheezes  She has no rales  Abdominal: Soft  Bowel sounds are normal  She exhibits no distension  There is no tenderness  Musculoskeletal: Normal range of motion  She exhibits no edema or deformity  Neurological: She is alert and oriented to person, place, and time  No cranial nerve deficit  Skin: Skin is warm and dry  No rash noted  She is not diaphoretic  Psychiatric: She has a normal mood and affect  Her behavior is normal    Vitals reviewed  Blood pressure 150/70, pulse 72, height 5' 4" (1 626 m), weight 80 2 kg (176 lb 12 8 oz), SpO2 96 %  Discussion/Summary:  RAGSDALE: unclear etiology  Relatively normal echo in June 2018 without significant structural/valvular issues    She does have risk factors of age, HTN, HLD, post-menopausal, family history of CAD, former smoker and had a stress test in June 2018 for which we will obtain results  We also checked a Holter with palpitations with exertion - this revealed no significant arrhythmias  Encouraged conditioning and starting an exercise regimen  Pulm HTN: PASP noted to be 31 mmHg in June 2018 - which is not in the range of pulm HTN, so unlikely to be contributing to symptoms  HTN: continued on amlodipine, elevated today - would increase to 10mg daily for improved control - is being managed by Dr Jud Weiss from nephrology  HLD: continued on zetia and statin with an LDL of 6 in May 2019  TG are 307 - which will not be helped by the statin or zetia  Will stop both and start on wellchol instead of Tricor since that hasn't done much  Will recheck levels in 3 months  Ezequiel Mccurdy MD  11/13/2019  9:56 AM  Sign at close encounter                                             Cardiology Consultation     Hilton Gonzalez  [id number]  1950  [address] [facility] [place] [address]  [id number]    1  Mixed hyperlipidemia     2  Pulmonary hypertension ([address] )     3  Dyspnea on exertion     4  Essential hypertension       Chief Complaint   Patient presents with    Follow-up     4 weeks    Migraine     couple days ago     HPI: Patient is here for continued cardiac evaluation of RAGSDALE and palpitations that also occur with exertion  Has been going on for at least the past 6 months  Has not necessarily been getting progressively worse  she has had a few surgeries, and so she has been unable to push herself  She is currently in a boot for her RLE  Has bilateral LE edema and increased fatigue over the past 6 months as well  No reported chest pain,  lightheadedness, syncope, orthopnea, PND, or significant weight changes  No significant change to symptoms since last visit      Patient Active Problem List   Diagnosis    Chronic kidney disease, stage III (moderate) (HCC)    Hypertensive chronic kidney disease with stage 1 through stage 4 chronic kidney disease, or unspecified chronic kidney disease    BARBARA on CPAP    RLS (restless legs syndrome)    Persistent proteinuria    Diabetic nephropathy associated with type 2 diabetes mellitus ([place] [address] )    Pulmonary hypertension (HCC)    Dyspnea    Mixed hyperlipidemia    Essential hypertension     Past Medical History:   Diagnosis Date    Anxiety     Arthritis     Diabetes mellitus ([place] [address] )     Family history of thyroid problem     Heart burn     Hyperplasia, parathyroid ([place] [address] )     Hypertension     Kidney problem     Seasonal allergies     Sleep apnea     Swollen ankles      Social History     Socioeconomic History    Marital status: /Civil Union     Spouse name: Not on file    Number of children: Not on file    Years of education: Not on file    Highest education level: Not on file   Occupational History    Not on file   Social Needs    Financial resource strain: Not on file    Food insecurity:     Worry: Not on file     Inability: Not on file    Transportation needs:     Medical: Not on file     Non-medical: Not on file   Tobacco Use    Smoking status: Former Smoker     Packs/day: 2 00     Years: 10 00     Pack years: 20 00     Types: Cigarettes     Last attempt to quit:      Years since quittin 8    Smokeless tobacco: Never Used    Tobacco comment: quit in    Substance and Sexual Activity    Alcohol use: Yes     Frequency: Monthly or less     Drinks per session: 1 or 2     Binge frequency: Never     Comment: rare    Drug use: No    Sexual activity: Not on file   Lifestyle    Physical activity:     Days per week: Not on file     Minutes per session: Not on file    Stress: Not on file   Relationships    Social connections:     Talks on phone: Not on file     Gets together: Not on file     Attends [employer] service: Not on file     Active member of club or organization: Not on file     Attends meetings of clubs or organizations: Not on file     Relationship status: Not on file    Intimate partner violence:     Fear of current or ex partner: Not on file     Emotionally abused: Not on file     Physically abused: Not on file     Forced sexual activity: Not on file   Other Topics Concern    Not on file   Social History Narrative    Not on file      Family History   Problem Relation Age of Onset    Heart disease Mother     Heart disease Father     Cancer Father         lung cancer     Past Surgical History:   Procedure Laterality Date    ARTHROSCOPY KNEE      BREAST BIOPSY      CHOLECYSTECTOMY      HIATAL HERNIA REPAIR      LIPOSUCTION      REDUCTION MAMMAPLASTY      SQUAMOUS CELL CARCINOMA EXCISION      TOE SURGERY      TONSILLECTOMY      TUBAL LIGATION         Current Outpatient Medications:     albuterol (VENTOLIN HFA) 90 mcg/act inhaler, Ventolin HFA 90 mcg/actuation aerosol inhaler, Disp: , Rfl:     amLODIPine (NORVASC) 5 mg tablet, Take one and one half tablets daily, Disp: 135 tablet, Rfl: 3    b complex vitamins tablet, Take 1 tablet by mouth daily , Disp: , Rfl:     B-D ULTRAFINE III SHORT PEN 31G X 8 MM MISC, USE AS DIRECTED 4 TIMES PER DAY, Disp: , Rfl: 3    Calcium Carbonate (CALCIUM 600 PO), Take 1 capsule by mouth 2 (two) times a day, Disp: , Rfl:     DULoxetine (CYMBALTA) 60 mg delayed release capsule, Take 60 mg by mouth daily, Disp: , Rfl:     ergocalciferol (VITAMIN D2) 50,000 units, Take 1 capsule (50,000 Units total) by mouth once a week for 12 doses, Disp: 12 capsule, Rfl: 0    ezetimibe (ZETIA) 10 mg tablet, Take 10 mg by mouth daily , Disp: , Rfl:     fenofibrate (TRICOR) 145 mg tablet, Take 145 mg by mouth daily, Disp: , Rfl:     gabapentin (NEURONTIN) 300 mg capsule, Take 300 mg by mouth Take 300 mg with dinner and 600mg at bedtime , Disp: , Rfl:     glimepiride (AMARYL) 2 mg tablet, Take 4 mg by mouth 2 (two) times a day , Disp: , Rfl:     insulin detemir (LEVEMIR) 100 units/mL subcutaneous injection, Inject 30 Units under the skin every morning , Disp: , Rfl:     levothyroxine 175 mcg tablet, Take 175 mcg by mouth daily, Disp: , Rfl:     losartan (COZAAR) 50 mg tablet, Take 50 mg by mouth daily , Disp: , Rfl:     Magnesium 500 MG CAPS, Take 1 capsule by mouth 3 (three) times a day, Disp: , Rfl:     methocarbamol (ROBAXIN) 500 mg tablet, Take 500 mg by mouth daily at bedtime as needed, Disp: , Rfl: 2    METHOCARBAMOL PO, Take by mouth daily at bedtime, Disp: , Rfl:     metoprolol tartrate (LOPRESSOR) 50 mg tablet, Take 50 mg by mouth every 12 (twelve) hours  , Disp: , Rfl:     Multiple Vitamin (MULTIVITAMIN) capsule, Take 1 capsule by mouth daily, Disp: , Rfl:     NOVOLOG FLEXPEN 100 units/mL injection pen, PLEASE SEE ATTACHED FOR DETAILED DIRECTIONS, Disp: , Rfl: 0    Omega-3 Fatty Acids (FISH OIL) 1,000 mg, Take 1,000 mg by mouth daily, Disp: , Rfl:     omega-3-acid ethyl esters (LOVAZA) 1 g capsule, , Disp: , Rfl:     omeprazole (PriLOSEC) 40 MG capsule, Take 40 mg by mouth daily , Disp: , Rfl:     pioglitazone (ACTOS) 30 mg tablet, Take 30 mg by mouth daily, Disp: , Rfl:     pramipexole (MIRAPEX) 0 25 mg tablet, Take 0 25 mg by mouth daily, Disp: , Rfl:     rosuvastatin (CRESTOR) 5 mg tablet, Take 5 mg by mouth daily, Disp: , Rfl:     torsemide (DEMADEX) 5 MG tablet, Take 1 tablet (5 mg total) by mouth daily, Disp: 30 tablet, Rfl: 5    Vitamin D, Ergocalciferol, 2000 units CAPS, Take 2,000 Units by mouth daily , Disp: , Rfl:     vitamin E, tocopherol, 1,000 units capsule, Take 2,000 Units by mouth daily , Disp: , Rfl:     cyclobenzaprine (FLEXERIL) 10 mg tablet, Take 10 mg by mouth daily, Disp: , Rfl:     Dulaglutide (TRULICITY) 3 29 KS/9 0WL SOPN, Inject 0 75 mg under the skin once a week, Disp: , Rfl:   Allergies   Allergen Reactions    Ciprofloxacin Hives     Vitals:    11/13/19 0937   BP: 150/70   BP Location: Left arm   Patient Position: Sitting   Cuff Size: Large   Pulse: 72   SpO2: 96%   Weight: 80 2 kg (176 lb 12 8 oz)   Height: 5' 4" (1 626 m)       Labs:  Documentation on 11/06/2019   Component Date Value    SODIUM 11/04/2019 142     POTASSIUM 11/04/2019 4 2     CHLORIDE 11/04/2019 106     CO2 11/04/2019 28     BUN 11/04/2019 41     CREATININE 11/04/2019 1 72     Glucose 11/04/2019 147     EXTERNAL CALCIUM 11/04/2019 9 3     TOTAL PROTEIN 11/04/2019 6 3     ALBUMIN 11/04/2019 3 6     AST 11/04/2019 20     ALT 11/04/2019 28     ALK PHOS 11/04/2019 51     EXTERNAL TOT  BILIRUBIN 11/04/2019 0 3     EXTERNAL EGFR 11/04/2019 30     Total CK 11/04/2019 53     Prot/Creat Ratio, Ur 11/04/2019 0 53*    Magnesium 11/04/2019 2 1     Phosphorus 11/04/2019 3 9     Hemoglobin 11/04/2019 11 8     Hematocrit 11/04/2019 35 6     WBC 11/04/2019 6 50     Platelets 12/23/5227 268    Orders Only on 11/04/2019   Component Date Value    PROTEIN UA 11/04/2019 32 3     EXT Creatinine Urine 11/04/2019 60 6     EXTERNAL Ur Prot/Creat R* 11/04/2019 0 53    Abstract on 07/18/2019   Component Date Value    SODIUM 07/10/2019 139     POTASSIUM 07/10/2019 4 2     CHLORIDE 07/10/2019 104     CO2 07/10/2019 28     CREATININE 07/10/2019 1 43     Glucose 07/10/2019 146     EXTERNAL CALCIUM 07/10/2019 9 7     EXTERNAL EGFR 07/10/2019 37    Orders Only on 05/23/2019   Component Date Value    PROTEIN UA 05/23/2019 33 9     EXT Creatinine Urine 05/23/2019 45 5     EXTERNAL Ur Prot/Creat R* 05/23/2019 0 75    Orders Only on 05/17/2019   Component Date Value    Hemoglobin A1C 05/17/2019 8 5     EXT Creatinine Urine 05/17/2019 74 0     Microalbum  ,U,Random 05/17/2019 37 4     EXTERNAL Microalb/Creat * 05/17/2019 505 4      No results found for: CHOL, TRIG, HDL, LDLDIRECT  Imaging: No results found  Review of Systems:  Review of Systems   Constitutional: Negative for activity change, appetite change, chills, diaphoresis, fatigue and unexpected weight change     HENT: Negative for hearing loss, nosebleeds and sore throat  Eyes: Negative for photophobia and visual disturbance  Respiratory: Positive for shortness of breath  Negative for cough, chest tightness and wheezing  Cardiovascular: Positive for palpitations  Negative for chest pain and leg swelling  Gastrointestinal: Negative for abdominal pain, diarrhea, nausea and vomiting  Endocrine: Negative for polyuria  Genitourinary: Negative for dysuria, frequency and hematuria  Musculoskeletal: Negative for arthralgias, back pain, gait problem and neck pain  Skin: Negative for pallor and rash  Neurological: Negative for dizziness, syncope and headaches  Hematological: Does not bruise/bleed easily  Psychiatric/Behavioral: Negative for behavioral problems and confusion  Physical Exam:  Physical Exam   Constitutional: She is oriented to person, place, and time  She appears well-developed and well-nourished  HENT:   Head: Normocephalic and atraumatic  Nose: Nose normal    Eyes: Pupils are equal, round, and reactive to light  EOM are normal  No scleral icterus  Neck: Normal range of motion  Neck supple  No JVD present  Cardiovascular: Normal rate, regular rhythm and normal heart sounds  Exam reveals no gallop and no friction rub  No murmur heard  Pulmonary/Chest: Effort normal and breath sounds normal  No respiratory distress  She has no wheezes  She has no rales  Abdominal: Soft  Bowel sounds are normal  She exhibits no distension  There is no tenderness  Musculoskeletal: Normal range of motion  She exhibits no edema or deformity  Neurological: She is alert and oriented to person, place, and time  No cranial nerve deficit  Skin: Skin is warm and dry  No rash noted  She is not diaphoretic  Psychiatric: She has a normal mood and affect  Her behavior is normal    Vitals reviewed  Blood pressure 150/70, pulse 72, height 5' 4" (1 626 m), weight 80 2 kg (176 lb 12 8 oz), SpO2 96 %      Discussion/Summary:  RAGSDALE: unclear etiology  Relatively normal echo in June 2018 without significant structural/valvular issues  She does have risk factors of age, HTN, HLD, post-menopausal, family history of CAD, former smoker and had a stress test in June 2018 for which we will obtain results  We also checked a Holter with palpitations with exertion - this revealed no significant arrhythmias  Encouraged conditioning and starting an exercise regimen  Pulm HTN: PASP noted to be 31 mmHg in June 2018 - which is not in the range of pulm HTN, so unlikely to be contributing to symptoms  HTN: continued on amlodipine, elevated today - would increase to 10mg daily for improved control  HLD: continued on zetia and statin with an LDL of 6 in May 2019  TG are 307 - which will not be helped by the statin or zetia  Will stop both and start on wellchol  Will recheck levels in 3 months

## 2019-11-13 NOTE — PROGRESS NOTES
Cardiology Consultation     Tu Moss  [id number]  1950  [address] [facility] [place] [id number]    1  Mixed hyperlipidemia     2  Pulmonary hypertension ([facility] [address] )     3  Dyspnea on exertion     4  Essential hypertension       Chief Complaint   Patient presents with    Follow-up     4 weeks    Migraine     couple days ago     HPI: Patient is here for continued cardiac evaluation of RAGSDALE and palpitations that also occur with exertion  Has been going on for at least the past 6 months  Has not necessarily been getting progressively worse  she has had a few surgeries, and so she has been unable to push herself  She is no longer in a boot for her RLE  Has bilateral LE edema and increased fatigue over the past 6 months as well  No reported chest pain,  lightheadedness, syncope, orthopnea, PND, or significant weight changes  No significant change to symptoms since last visit      Patient Active Problem List   Diagnosis    Chronic kidney disease, stage III (moderate) (HCC)    Hypertensive chronic kidney disease with stage 1 through stage 4 chronic kidney disease, or unspecified chronic kidney disease    BARBARA on CPAP    RLS (restless legs syndrome)    Persistent proteinuria    Diabetic nephropathy associated with type 2 diabetes mellitus ([facility] [address] )    Pulmonary hypertension (HCC)    Dyspnea    Mixed hyperlipidemia    Essential hypertension     Past Medical History:   Diagnosis Date    Anxiety     Arthritis     Diabetes mellitus ([facility] [address] )     Family history of thyroid problem     Heart burn     Hyperplasia, parathyroid ([facility] [address] )     Hypertension     Kidney problem     Seasonal allergies     Sleep apnea     Swollen ankles      Social History     Socioeconomic History    Marital status: /Civil Union     Spouse name: Not on file    Number of children: Not on file    Years of education: Not on file    Highest education level: Not on file   Occupational History    Not on file   Social Needs    Financial resource strain: Not on file    Food insecurity:     Worry: Not on file     Inability: Not on file    Transportation needs:     Medical: Not on file     Non-medical: Not on file   Tobacco Use    Smoking status: Former Smoker     Packs/day: 2 00     Years: 10 00     Pack years: 20 00     Types: Cigarettes     Last attempt to quit:      Years since quittin 8    Smokeless tobacco: Never Used    Tobacco comment: quit in    Substance and Sexual Activity    Alcohol use: Yes     Frequency: Monthly or less     Drinks per session: 1 or 2     Binge frequency: Never     Comment: rare    Drug use: No    Sexual activity: Not on file   Lifestyle    Physical activity:     Days per week: Not on file     Minutes per session: Not on file    Stress: Not on file   Relationships    Social connections:     Talks on phone: Not on file     Gets together: Not on file     Attends Yazidi service: Not on file     Active member of club or organization: Not on file     Attends meetings of clubs or organizations: Not on file     Relationship status: Not on file    Intimate partner violence:     Fear of current or ex partner: Not on file     Emotionally abused: Not on file     Physically abused: Not on file     Forced sexual activity: Not on file   Other Topics Concern    Not on file   Social History Narrative    Not on file      Family History   Problem Relation Age of Onset    Heart disease Mother     Heart disease Father     Cancer Father         lung cancer     Past Surgical History:   Procedure Laterality Date    ARTHROSCOPY KNEE      BREAST BIOPSY      CHOLECYSTECTOMY      HIATAL HERNIA REPAIR      LIPOSUCTION      REDUCTION MAMMAPLASTY      SQUAMOUS CELL CARCINOMA EXCISION      TOE SURGERY      TONSILLECTOMY      TUBAL LIGATION         Current Outpatient Medications:     albuterol (VENTOLIN HFA) 90 mcg/act inhaler, Ventolin HFA 90 mcg/actuation aerosol inhaler, Disp: , Rfl:     amLODIPine (NORVASC) 5 mg tablet, Take one and one half tablets daily, Disp: 135 tablet, Rfl: 3    b complex vitamins tablet, Take 1 tablet by mouth daily , Disp: , Rfl:     B-D ULTRAFINE III SHORT PEN 31G X 8 MM MISC, USE AS DIRECTED 4 TIMES PER DAY, Disp: , Rfl: 3    Calcium Carbonate (CALCIUM 600 PO), Take 1 capsule by mouth 2 (two) times a day, Disp: , Rfl:     DULoxetine (CYMBALTA) 60 mg delayed release capsule, Take 60 mg by mouth daily, Disp: , Rfl:     ergocalciferol (VITAMIN D2) 50,000 units, Take 1 capsule (50,000 Units total) by mouth once a week for 12 doses, Disp: 12 capsule, Rfl: 0    ezetimibe (ZETIA) 10 mg tablet, Take 10 mg by mouth daily , Disp: , Rfl:     fenofibrate (TRICOR) 145 mg tablet, Take 145 mg by mouth daily, Disp: , Rfl:     gabapentin (NEURONTIN) 300 mg capsule, Take 300 mg by mouth Take 300 mg with dinner and 600mg at bedtime , Disp: , Rfl:     glimepiride (AMARYL) 2 mg tablet, Take 4 mg by mouth 2 (two) times a day , Disp: , Rfl:     insulin detemir (LEVEMIR) 100 units/mL subcutaneous injection, Inject 30 Units under the skin every morning , Disp: , Rfl:     levothyroxine 175 mcg tablet, Take 175 mcg by mouth daily, Disp: , Rfl:     losartan (COZAAR) 50 mg tablet, Take 50 mg by mouth daily , Disp: , Rfl:     Magnesium 500 MG CAPS, Take 1 capsule by mouth 3 (three) times a day, Disp: , Rfl:     methocarbamol (ROBAXIN) 500 mg tablet, Take 500 mg by mouth daily at bedtime as needed, Disp: , Rfl: 2    METHOCARBAMOL PO, Take by mouth daily at bedtime, Disp: , Rfl:     metoprolol tartrate (LOPRESSOR) 50 mg tablet, Take 50 mg by mouth every 12 (twelve) hours  , Disp: , Rfl:     Multiple Vitamin (MULTIVITAMIN) capsule, Take 1 capsule by mouth daily, Disp: , Rfl:     NOVOLOG FLEXPEN 100 units/mL injection pen, PLEASE SEE ATTACHED FOR DETAILED DIRECTIONS, Disp: , Rfl: 0   Omega-3 Fatty Acids (FISH OIL) 1,000 mg, Take 1,000 mg by mouth daily, Disp: , Rfl:     omega-3-acid ethyl esters (LOVAZA) 1 g capsule, , Disp: , Rfl:     omeprazole (PriLOSEC) 40 MG capsule, Take 40 mg by mouth daily , Disp: , Rfl:     pioglitazone (ACTOS) 30 mg tablet, Take 30 mg by mouth daily, Disp: , Rfl:     pramipexole (MIRAPEX) 0 25 mg tablet, Take 0 25 mg by mouth daily, Disp: , Rfl:     rosuvastatin (CRESTOR) 5 mg tablet, Take 5 mg by mouth daily, Disp: , Rfl:     torsemide (DEMADEX) 5 MG tablet, Take 1 tablet (5 mg total) by mouth daily, Disp: 30 tablet, Rfl: 5    Vitamin D, Ergocalciferol, 2000 units CAPS, Take 2,000 Units by mouth daily , Disp: , Rfl:     vitamin E, tocopherol, 1,000 units capsule, Take 2,000 Units by mouth daily , Disp: , Rfl:     cyclobenzaprine (FLEXERIL) 10 mg tablet, Take 10 mg by mouth daily, Disp: , Rfl:     Dulaglutide (TRULICITY) 1 41 TQ/4 6HN SOPN, Inject 0 75 mg under the skin once a week, Disp: , Rfl:   Allergies   Allergen Reactions    Ciprofloxacin Hives     Vitals:    11/13/19 0937   BP: 150/70   BP Location: Left arm   Patient Position: Sitting   Cuff Size: Large   Pulse: 72   SpO2: 96%   Weight: 80 2 kg (176 lb 12 8 oz)   Height: 5' 4" (1 626 m)       Labs:  Documentation on 11/06/2019   Component Date Value    SODIUM 11/04/2019 142     POTASSIUM 11/04/2019 4 2     CHLORIDE 11/04/2019 106     CO2 11/04/2019 28     BUN 11/04/2019 41     CREATININE 11/04/2019 1 72     Glucose 11/04/2019 147     EXTERNAL CALCIUM 11/04/2019 9 3     TOTAL PROTEIN 11/04/2019 6 3     ALBUMIN 11/04/2019 3 6     AST 11/04/2019 20     ALT 11/04/2019 28     ALK PHOS 11/04/2019 51     EXTERNAL TOT   BILIRUBIN 11/04/2019 0 3     EXTERNAL EGFR 11/04/2019 30     Total CK 11/04/2019 53     Prot/Creat Ratio, Ur 11/04/2019 0 53*    Magnesium 11/04/2019 2 1     Phosphorus 11/04/2019 3 9     Hemoglobin 11/04/2019 11 8     Hematocrit 11/04/2019 35 6     WBC 11/04/2019 6 50  Platelets 77/84/0595 268    Orders Only on 11/04/2019   Component Date Value    PROTEIN UA 11/04/2019 32 3     EXT Creatinine Urine 11/04/2019 60 6     EXTERNAL Ur Prot/Creat R* 11/04/2019 0 53    Abstract on 07/18/2019   Component Date Value    SODIUM 07/10/2019 139     POTASSIUM 07/10/2019 4 2     CHLORIDE 07/10/2019 104     CO2 07/10/2019 28     CREATININE 07/10/2019 1 43     Glucose 07/10/2019 146     EXTERNAL CALCIUM 07/10/2019 9 7     EXTERNAL EGFR 07/10/2019 37    Orders Only on 05/23/2019   Component Date Value    PROTEIN UA 05/23/2019 33 9     EXT Creatinine Urine 05/23/2019 45 5     EXTERNAL Ur Prot/Creat R* 05/23/2019 0 75    Orders Only on 05/17/2019   Component Date Value    Hemoglobin A1C 05/17/2019 8 5     EXT Creatinine Urine 05/17/2019 74 0     Microalbum  ,U,Random 05/17/2019 37 4     EXTERNAL Microalb/Creat * 05/17/2019 505 4      No results found for: CHOL, TRIG, HDL, LDLDIRECT  Imaging: No results found  Review of Systems:  Review of Systems   Constitutional: Negative for activity change, appetite change, chills, diaphoresis, fatigue and unexpected weight change  HENT: Negative for hearing loss, nosebleeds and sore throat  Eyes: Negative for photophobia and visual disturbance  Respiratory: Positive for shortness of breath  Negative for cough, chest tightness and wheezing  Cardiovascular: Positive for palpitations  Negative for chest pain and leg swelling  Gastrointestinal: Negative for abdominal pain, diarrhea, nausea and vomiting  Endocrine: Negative for polyuria  Genitourinary: Negative for dysuria, frequency and hematuria  Musculoskeletal: Negative for arthralgias, back pain, gait problem and neck pain  Skin: Negative for pallor and rash  Neurological: Negative for dizziness, syncope and headaches  Hematological: Does not bruise/bleed easily  Psychiatric/Behavioral: Negative for behavioral problems and confusion         Physical Exam:  Physical Exam   Constitutional: She is oriented to person, place, and time  She appears well-developed and well-nourished  HENT:   Head: Normocephalic and atraumatic  Nose: Nose normal    Eyes: Pupils are equal, round, and reactive to light  EOM are normal  No scleral icterus  Neck: Normal range of motion  Neck supple  No JVD present  Cardiovascular: Normal rate, regular rhythm and normal heart sounds  Exam reveals no gallop and no friction rub  No murmur heard  Pulmonary/Chest: Effort normal and breath sounds normal  No respiratory distress  She has no wheezes  She has no rales  Abdominal: Soft  Bowel sounds are normal  She exhibits no distension  There is no tenderness  Musculoskeletal: Normal range of motion  She exhibits no edema or deformity  Neurological: She is alert and oriented to person, place, and time  No cranial nerve deficit  Skin: Skin is warm and dry  No rash noted  She is not diaphoretic  Psychiatric: She has a normal mood and affect  Her behavior is normal    Vitals reviewed  Blood pressure 150/70, pulse 72, height 5' 4" (1 626 m), weight 80 2 kg (176 lb 12 8 oz), SpO2 96 %  Discussion/Summary:  RAGSDALE: unclear etiology - perhaps related to deconditioning  Relatively normal echo in June 2018 without significant structural/valvular issues  She does have risk factors of age, HTN, HLD, post-menopausal, family history of CAD, former smoker and had a stress test in June 2018 for which we will obtain results  We also checked a Holter with palpitations with exertion - this revealed no significant arrhythmias  Encouraged conditioning and starting an exercise regimen  Pulm HTN: PASP noted to be 31 mmHg in June 2018 - which is not in the range of pulm HTN, so unlikely to be contributing to symptoms  HTN: continued on amlodipine, elevated today - would increase to 10mg daily for improved control - is being managed by Dr Jud Weiss from nephrology      HLD: continued on zetia and statin with an LDL of 6 in May 2019  TG are 307 - which will not be helped by the statin or zetia  Will stop both and start on wellchol instead of Tricor since that hasn't done much  Will recheck levels in 3 months

## 2019-11-18 ENCOUNTER — OFFICE VISIT (OUTPATIENT)
Dept: PULMONOLOGY | Facility: CLINIC | Age: 69
End: 2019-11-18
Payer: MEDICARE

## 2019-11-18 VITALS
TEMPERATURE: 97.2 F | DIASTOLIC BLOOD PRESSURE: 70 MMHG | HEIGHT: 64 IN | OXYGEN SATURATION: 97 % | RESPIRATION RATE: 16 BRPM | SYSTOLIC BLOOD PRESSURE: 124 MMHG | HEART RATE: 67 BPM | WEIGHT: 177 LBS | BODY MASS INDEX: 30.22 KG/M2

## 2019-11-18 DIAGNOSIS — R06.00 DYSPNEA ON EXERTION: ICD-10-CM

## 2019-11-18 DIAGNOSIS — G25.81 RLS (RESTLESS LEGS SYNDROME): ICD-10-CM

## 2019-11-18 DIAGNOSIS — G47.33 OSA (OBSTRUCTIVE SLEEP APNEA): Primary | ICD-10-CM

## 2019-11-18 PROCEDURE — 99214 OFFICE O/P EST MOD 30 MIN: CPT | Performed by: INTERNAL MEDICINE

## 2019-11-18 NOTE — PROGRESS NOTES
Progress Note - Pulmonary and Sleep Medicine  Kb Martinez 78 y o  female MRN: [id number]       Impression & Plan:   78 y o  F with PMHx of DM, HTN, Hyperlipidemia and hiatal hernia s/p repair who comes in for follow up with her BARBARA s/p UPPP on CPAP     1   Mild BARBARA (AHI - 13 1) now on autoPAP 10-20 without oxygen   Residual AHI of 6/7, mostly obstructive   She has good compliance   ABG without evidence of chronic hypercapnia         -  increase autoPAP to 11-20 cc of H2O pressure to help get rid of a few more events         -  Will renew supplies annually      -  She is aware of the risk of leaving sleep apnea untreated including hypertension, heart failure, arrhythmia, MI and stroke      2   RLS and significant neuropathy -  she states that this is completely resolved when she is using the medications below  Would not change regimen        -  Continue Neurontin 300mg PO at dinner and 600mg at bedtime          -  Continue Mirapex 0 25 qHS  -  Also on methacarbamol as well     3   Dyspnea with exertion -  in part obesity, deconditioning, diastolic HF and possible pulmonary hypertension   PFT with moderate restriction with mildly reduced DLCO     6 minute walk with borderline desaturation       -  She has noted clinical improvement with dyspnea  She has been more active and is exercising daily          5   Mild pulmonary hypertension/RV hypertrophy on echo -  She is following with cardiology for this as well  Can consider R heart cath if this worsening        ______________________________________________________________________    HPI:    Kb Martinez presents today for follow-up of for her BARBARA, RLS and dyspnea on exertion  She states that she is sleeping much better  She denies RLS symptoms and feel like her sleep quality has improved with the use of CPAP  She has good compliance and feels better when using the machine         As far as dyspnea during the day, she does not seem to suffer with that very much   She has been trying to lose weight but continues to gain despite her efforts  She feels the weight compressing her lungs and she gets dyspnea as a result  Review of Systems:  Review of Systems   Constitutional: Negative  HENT: Negative  Respiratory: Negative  Cardiovascular: Negative  Gastrointestinal: Negative  Negative for abdominal distention  Endocrine: Negative  Genitourinary: Negative  Musculoskeletal: Negative  Skin: Negative  Neurological: Negative  Hematological: Negative            Social history updates:  Social History     Tobacco Use   Smoking Status Former Smoker    Packs/day: 2 00    Years: 10 00    Pack years: 20 00    Types: Cigarettes    Last attempt to quit:     Years since quittin 9   Smokeless Tobacco Never Used   Tobacco Comment    quit in      Social History     Socioeconomic History    Marital status: /Civil Union     Spouse name: Not on file    Number of children: Not on file    Years of education: Not on file    Highest education level: Not on file   Occupational History    Not on file   Social Needs    Financial resource strain: Not on file    Food insecurity:     Worry: Not on file     Inability: Not on file    Transportation needs:     Medical: Not on file     Non-medical: Not on file   Tobacco Use    Smoking status: Former Smoker     Packs/day: 2 00     Years: 10 00     Pack years: 20 00     Types: Cigarettes     Last attempt to quit:      Years since quittin 9    Smokeless tobacco: Never Used    Tobacco comment: quit in    Substance and Sexual Activity    Alcohol use: Yes     Frequency: Monthly or less     Drinks per session: 1 or 2     Binge frequency: Never     Comment: rare    Drug use: No    Sexual activity: Not on file   Lifestyle    Physical activity:     Days per week: Not on file     Minutes per session: Not on file    Stress: Not on file   Relationships    Social connections: Talks on phone: Not on file     Gets together: Not on file     Attends Nondenominational service: Not on file     Active member of club or organization: Not on file     Attends meetings of clubs or organizations: Not on file     Relationship status: Not on file    Intimate partner violence:     Fear of current or ex partner: Not on file     Emotionally abused: Not on file     Physically abused: Not on file     Forced sexual activity: Not on file   Other Topics Concern    Not on file   Social History Narrative    Not on file       PhysicalExamination:  Vitals:   /70 (BP Location: Left arm, Patient Position: Sitting, Cuff Size: Adult)   Pulse 67   Temp (!) 97 2 °F (36 2 °C) (Tympanic)   Resp 16   Ht 5' 4" (1 626 m)   Wt 80 3 kg (177 lb)   SpO2 97%   BMI 30 38 kg/m²   General: Pleasant, Awake alert and oriented x 3, conversant without conversational dyspnea, NAD, normal affect  HEENT:  PERRL, Sclera noninjected, nonicteric OU, Nares patent,  no craniofacial abnormalities, Mucous membranes, moist, no oral lesions, normal dentition  NECK: Trachea midline, no accessory muscle use, no stridor, no cervical or supraclavicular adenopathy, JVP not elevated  CARDIAC: Reg, single s1/S2, no m/r/g  PULM: CTA bilaterally no wheezing, rhonchi or rales  ABD: Normoactive bowel sounds, soft nontender, nondistended, no rebound, no rigidity, no guarding  EXT: No cyanosis, no clubbing, no edema, normal capillary refill  NEURO: no focal neurologic deficits, AAOx3, moving all extremities appropriately      Diagnostic Data:  Labs: I personally reviewed the most recent laboratory data pertinent to today's visit  I have personally reviewed pertinent lab results    Lab Results   Component Value Date    WBC 6 50 11/04/2019    HGB 11 8 11/04/2019    HCT 35 6 11/04/2019    MCV 85 04/06/2019     11/04/2019     Lab Results   Component Value Date    GLUCOSE 168 (H) 11/30/2018    CALCIUM 9 3 11/04/2019    K 4 2 11/04/2019    CO2 28 11/04/2019     11/04/2019    BUN 41 11/04/2019    CREATININE 1 72 11/04/2019     No results found for: IGE  Lab Results   Component Value Date    ALT 28 11/04/2019    AST 20 11/04/2019    ALKPHOS 51 11/04/2019     PFTs:  The most recent pulmonary function tests were reviewed  Cherellemindy Pengkiera in office 8/2/19  Prebronchodilator  FVC - 2 29 (79%)  FEV1 - 2 20 (83%)  FEV1/FVC - 76%     Post bronchodilator  FVC - 2 37 (82%)  FEV1 - 1 86 (84%)  FEV1/FVC -  78%  Very mild restriction      6 minute walk 8/2/19  Starting saturation - 94%   Starting HR - 74 bpm  Lowest saturation - 90%    Highest HR - 94 bpm  Ambulated - 252 m without the need for oxygen     Imaging  I personally reviewed the images on the [facility] system pertinent to today's visit  No imaging noted in system     Cardiac:  Echo performed at [facility]     Sleep studies:  Diagnostic: 2011 -  AHI 13 1, severe hypoxia (sat 66%)     CPAP 5/8/19  Mrs Fuentes underwent a titration of CPAP to 10 cc of H2O pressure  She did well at 9 cc of H2O pressure in REM when in  the nonsupine position  However, she did not sleep much in the supine position  Therefore, I recommend a trial of autotitrating  CPAP 9 -20 cc of H2O pressure with Cflex 3 and heated humidification using a small Resmed Airfit F20  Compliance should be evaluated in 1-2 months  In addition, she had an increased number of limb movements (PLM index 35 7)   If she still has daytime sleepiness, I would  recommend pharmacologic treatment with Neurontin, Lyrica, Mirapex or Requip    New Compliance Data:  Type of CPAP: CPAP 10 - 20                                    8/17/19 - 11/14/19, mean 10 5, Max 17 1                                   Percent usage: 100%, > 4 hrs 83%                                   Average time used: 5 hrs 48 mins, up to 9 hrs 27 mins                                  Time in large leak: 3 mins 8 seconds                                   Residual AHI: 6 5    Compliance Data:  Type of CPAP: CPAP 9 - 20 6/29/19 - 7/28/19, mean 10 2, Max 14 1                                   Percent usage: 100%                                   Average time used: 6 hrs 22 mins, up to 8 hrs 32 mins                                  Time in large leak: 32 seconds                                   Residual AHI: 4 9        Compliance Data:  Type of CPAP: CPAP 12 3/5/19 - 4/3/19                                   Percent usage: 100%                                   Average time used: 5 hrs 40 mins, up to 8 hrs 32 mins                                  Time in large leak: 7 mins                                   Residual AHI: 2 5        Compliance Data:  Type of CPAP:  10                                   Average time used: 2 hrs                                   Residual AHI: 2 2      Naida Taveras DO

## 2019-11-18 NOTE — LETTER
November 18, 2019     Edis Rivero, [address] [place] [address]     Patient: Wilberto Negrete   YOB: 1950   Date of Visit: 11/18/2019       Dear Dr Ashley Leon:    Thank you for referring Wilberto Negrete to me for evaluation  Below are my notes for this consultation  If you have questions, please do not hesitate to call me  I look forward to following your patient along with you  Sincerely,        Render Anon, DO        CC: No Recipients  Render Anon, DO  11/18/2019  2:54 PM  Sign at close encounter  Progress Note - Pulmonary and Sleep Medicine  Wilberto Negrete 78 y o  female MRN: [id number]       Impression & Plan:   78 y o  F with PMHx of DM, HTN, Hyperlipidemia and hiatal hernia s/p repair who comes in for follow up with her BARBARA s/p UPPP on CPAP     1   Mild BARBARA (AHI - 13 1) now on autoPAP 10-20 without oxygen   Residual AHI of 6/7, mostly obstructive   She has good compliance   ABG without evidence of chronic hypercapnia         -  increase autoPAP to 11-20 cc of H2O pressure to help get rid of a few more events         -  Will renew supplies annually      -  She is aware of the risk of leaving sleep apnea untreated including hypertension, heart failure, arrhythmia, MI and stroke      2   RLS and significant neuropathy -  she states that this is completely resolved when she is using the medications below  Would not change regimen        -  Continue Neurontin 300mg PO at dinner and 600mg at bedtime          -  Continue Mirapex 0 25 qHS  -  Also on methacarbamol as well     3   Dyspnea with exertion -  in part obesity, deconditioning, diastolic HF and possible pulmonary hypertension   PFT with moderate restriction with mildly reduced DLCO     6 minute walk with borderline desaturation       -  She has noted clinical improvement with dyspnea     She has been more active and is exercising daily          5   Mild pulmonary hypertension/RV hypertrophy on echo -   She is following with cardiology for this as well  Can consider R heart cath if this worsening        ______________________________________________________________________    HPI:    Wellington Gonzáles presents today for follow-up of for her BARBARA, RLS and dyspnea on exertion  She states that she is sleeping much better  She denies RLS symptoms and feel like her sleep quality has improved with the use of CPAP  She has good compliance and feels better when using the machine  As far as dyspnea during the day, she does not seem to suffer with that very much  She has been trying to lose weight but continues to gain despite her efforts  She feels the weight compressing her lungs and she gets dyspnea as a result  Review of Systems:  Review of Systems   Constitutional: Negative  HENT: Negative  Respiratory: Negative  Cardiovascular: Negative  Gastrointestinal: Negative  Negative for abdominal distention  Endocrine: Negative  Genitourinary: Negative  Musculoskeletal: Negative  Skin: Negative  Neurological: Negative  Hematological: Negative            Social history updates:  Social History     Tobacco Use   Smoking Status Former Smoker    Packs/day: 2 00    Years: 10 00    Pack years: 20 00    Types: Cigarettes    Last attempt to quit:     Years since quittin 9   Smokeless Tobacco Never Used   Tobacco Comment    quit in      Social History     Socioeconomic History    Marital status: /Civil Union     Spouse name: Not on file    Number of children: Not on file    Years of education: Not on file    Highest education level: Not on file   Occupational History    Not on file   Social Needs    Financial resource strain: Not on file    Food insecurity:     Worry: Not on file     Inability: Not on file    Transportation needs:     Medical: Not on file     Non-medical: Not on file   Tobacco Use    Smoking status: Former Smoker     Packs/day: 2 00     Years: 10 00 Pack years: 20 00     Types: Cigarettes     Last attempt to quit:      Years since quittin 9    Smokeless tobacco: Never Used    Tobacco comment: quit in    Substance and Sexual Activity    Alcohol use: Yes     Frequency: Monthly or less     Drinks per session: 1 or 2     Binge frequency: Never     Comment: rare    Drug use: No    Sexual activity: Not on file   Lifestyle    Physical activity:     Days per week: Not on file     Minutes per session: Not on file    Stress: Not on file   Relationships    Social connections:     Talks on phone: Not on file     Gets together: Not on file     Attends [employer] service: Not on file     Active member of club or organization: Not on file     Attends meetings of clubs or organizations: Not on file     Relationship status: Not on file    Intimate partner violence:     Fear of current or ex partner: Not on file     Emotionally abused: Not on file     Physically abused: Not on file     Forced sexual activity: Not on file   Other Topics Concern    Not on file   Social History Narrative    Not on file       PhysicalExamination:  Vitals:   /70 (BP Location: Left arm, Patient Position: Sitting, Cuff Size: Adult)   Pulse 67   Temp (!) 97 2 °F (36 2 °C) (Tympanic)   Resp 16   Ht 5' 4" (1 626 m)   Wt 80 3 kg (177 lb)   SpO2 97%   BMI 30 38 kg/m²    General: Pleasant, Awake alert and oriented x 3, conversant without conversational dyspnea, NAD, normal affect  HEENT:  PERRL, Sclera noninjected, nonicteric OU, Nares patent,  no craniofacial abnormalities, Mucous membranes, moist, no oral lesions, normal dentition  NECK: Trachea midline, no accessory muscle use, no stridor, no cervical or supraclavicular adenopathy, JVP not elevated  CARDIAC: Reg, single s1/S2, no m/r/g  PULM: CTA bilaterally no wheezing, rhonchi or rales  ABD: Normoactive bowel sounds, soft nontender, nondistended, no rebound, no rigidity, no guarding  EXT: No cyanosis, no clubbing, no edema, normal capillary refill  NEURO: no focal neurologic deficits, AAOx3, moving all extremities appropriately      Diagnostic Data:  Labs: I personally reviewed the most recent laboratory data pertinent to today's visit  I have personally reviewed pertinent lab results  Lab Results   Component Value Date    WBC 6 50 11/04/2019    HGB 11 8 11/04/2019    HCT 35 6 11/04/2019    MCV 85 04/06/2019     11/04/2019     Lab Results   Component Value Date    GLUCOSE 168 (H) 11/30/2018    CALCIUM 9 3 11/04/2019    K 4 2 11/04/2019    CO2 28 11/04/2019     11/04/2019    BUN 41 11/04/2019    CREATININE 1 72 11/04/2019     No results found for: IGE  Lab Results   Component Value Date    ALT 28 11/04/2019    AST 20 11/04/2019    ALKPHOS 51 11/04/2019     PFTs:  The most recent pulmonary function tests were reviewed  Andreina Perez in office 8/2/19  Prebronchodilator  FVC - 2 29 (79%)  FEV1 - 2 20 (83%)  FEV1/FVC - 76%     Post bronchodilator  FVC - 2 37 (82%)  FEV1 - 1 86 (84%)  FEV1/FVC -  78%  Very mild restriction      6 minute walk 8/2/19  Starting saturation - 94%   Starting HR - 74 bpm  Lowest saturation - 90%    Highest HR - 94 bpm  Ambulated - 252 m without the need for oxygen     Imaging  I personally reviewed the images on the [facility] system pertinent to today's visit  No imaging noted in system     Cardiac:  Echo performed at [facility]     Sleep studies:  Diagnostic: 2011 -  AHI 13 1, severe hypoxia (sat 66%)     CPAP 5/8/19  Mrs Fuentes underwent a titration of CPAP to 10 cc of H2O pressure  She did well at 9 cc of H2O pressure in REM when in  the nonsupine position  However, she did not sleep much in the supine position  Therefore, I recommend a trial of autotitrating  CPAP 9 -20 cc of H2O pressure with Cflex 3 and heated humidification using a small Resmed Airfit F20  Compliance should be evaluated in 1-2 months  In addition, she had an increased number of limb movements (PLM index 35 7)   If she still has daytime sleepiness, I would  recommend pharmacologic treatment with Neurontin, Lyrica, Mirapex or Requip    New Compliance Data:  Type of CPAP: CPAP  10 - 20 8/17/19 -  11/14/19, mean 10 5, Max 17 1                                   Percent usage: 100%, > 4 hrs 83%                                   Average time used:  5 hrs  48 mins, up to 9 hrs 27 mins                                  Time in large leak:  3 mins 8 seconds                                   Residual AHI:  6 5    Compliance Data:  Type of CPAP: CPAP 9 - 20 6/29/19 - 7/28/19, mean 10 2, Max 14 1                                   Percent usage: 100%                                   Average time used: 6 hrs 22 mins, up to 8 hrs 32 mins                                  Time in large leak: 32 seconds                                   Residual AHI: 4 9        Compliance Data:  Type of CPAP: CPAP 12 3/5/19 - 4/3/19                                   Percent usage: 100%                                   Average time used: 5 hrs 40 mins, up to 8 hrs 32 mins                                  Time in large leak: 7 mins                                   Residual AHI: 2 5        Compliance Data:  Type of CPAP:  10                                   Average time used: 2 hrs                                   Residual AHI: 2 2      Henry Ontiveros DO

## 2019-11-30 ENCOUNTER — HOSPITAL ENCOUNTER (EMERGENCY)
Facility: HOSPITAL | Age: 69
Discharge: HOME | End: 2019-11-30
Attending: EMERGENCY MEDICINE
Payer: MEDICARE

## 2019-11-30 VITALS
BODY MASS INDEX: 30.3 KG/M2 | OXYGEN SATURATION: 91 % | SYSTOLIC BLOOD PRESSURE: 150 MMHG | WEIGHT: 177.47 LBS | DIASTOLIC BLOOD PRESSURE: 72 MMHG | RESPIRATION RATE: 18 BRPM | HEIGHT: 64 IN | HEART RATE: 61 BPM | TEMPERATURE: 98.2 F

## 2019-11-30 DIAGNOSIS — R60.0 BILATERAL LEG EDEMA: ICD-10-CM

## 2019-11-30 DIAGNOSIS — M79.89 LEG SWELLING: Primary | ICD-10-CM

## 2019-11-30 PROCEDURE — 99283 EMERGENCY DEPT VISIT LOW MDM: CPT

## 2019-11-30 PROCEDURE — 99282 EMERGENCY DEPT VISIT SF MDM: CPT | Performed by: EMERGENCY MEDICINE

## 2019-11-30 NOTE — ED PROVIDER NOTES
History  Chief Complaint   Patient presents with    Leg Swelling     Pt presents to ED from home after walking around all day, went to movies, then shopping now legs swollen bilaterally  Pt (+) "water pill", (+) hx HTN  Patient presents to the emergency department for evaluation of bilateral leg swelling that began today after extensive walking beyond her normal baseline amount of walking  She does take Lasix and has been compliant with her medications  She denies chest pain or sob  Denies fever chills or cough  Do not think she can speak to her physician so she came in for evaluation  She has been eating and drinking normally and moving her bowels and urinating without difficulty  Prior to Admission Medications   Prescriptions Last Dose Informant Patient Reported? Taking?    B-D ULTRAFINE III SHORT PEN 31G X 8 MM MISC  Self Yes No   Sig: USE AS DIRECTED 4 TIMES PER DAY   Calcium Carbonate (CALCIUM 600 PO)  Self Yes No   Sig: Take 1 capsule by mouth 2 (two) times a day   DULoxetine (CYMBALTA) 60 mg delayed release capsule  Self Yes No   Sig: Take 60 mg by mouth daily   Dulaglutide (TRULICITY) 1 41 US/9 6AL SOPN  Self Yes No   Sig: Inject 0 75 mg under the skin once a week   METHOCARBAMOL PO  Self Yes No   Sig: Take by mouth daily at bedtime   Magnesium 500 MG CAPS  Self Yes No   Sig: Take 1 capsule by mouth 3 (three) times a day   Multiple Vitamin (MULTIVITAMIN) capsule  Self Yes No   Sig: Take 1 capsule by mouth daily   NOVOLOG FLEXPEN 100 units/mL injection pen  Self Yes No   Sig: PLEASE SEE ATTACHED FOR DETAILED DIRECTIONS   Omega-3 Fatty Acids (FISH OIL) 1,000 mg  Self Yes No   Sig: Take 1,000 mg by mouth daily   Vitamin D, Ergocalciferol, 2000 units CAPS  Self Yes No   Sig: Take 2,000 Units by mouth daily    albuterol (VENTOLIN HFA) 90 mcg/act inhaler  Self Yes No   amLODIPine (NORVASC) 5 mg tablet  Self No No   Sig: Take one and one half tablets daily   b complex vitamins tablet  Self Yes No Sig: Take 1 tablet by mouth daily    colesevelam (WELCHOL) 625 mg tablet   No No   Sig: Take 3 tablets (1,875 mg total) by mouth 2 (two) times a day with meals   Patient not taking: Reported on 11/18/2019   cyclobenzaprine (FLEXERIL) 10 mg tablet  Self Yes No   Sig: Take 10 mg by mouth daily   ergocalciferol (VITAMIN D2) 50,000 units  Self No No   Sig: Take 1 capsule (50,000 Units total) by mouth once a week for 12 doses   gabapentin (NEURONTIN) 300 mg capsule  Self Yes No   Sig: Take 300 mg by mouth Take 300 mg with dinner and 600mg at bedtime    glimepiride (AMARYL) 2 mg tablet  Self Yes No   Sig: Take 4 mg by mouth 2 (two) times a day    insulin detemir (LEVEMIR) 100 units/mL subcutaneous injection  Self Yes No   Sig: Inject 30 Units under the skin every morning    levothyroxine 175 mcg tablet  Self Yes No   Sig: Take 175 mcg by mouth daily   losartan (COZAAR) 50 mg tablet  Self Yes No   Sig: Take 50 mg by mouth daily    methocarbamol (ROBAXIN) 500 mg tablet  Self Yes No   Sig: Take 500 mg by mouth daily at bedtime as needed   metoprolol tartrate (LOPRESSOR) 50 mg tablet  Self Yes No   Sig: Take 50 mg by mouth every 12 (twelve) hours     omega-3-acid ethyl esters (LOVAZA) 1 g capsule  Self Yes No   omeprazole (PriLOSEC) 40 MG capsule  Self Yes No   Sig: Take 40 mg by mouth daily    pioglitazone (ACTOS) 30 mg tablet  Self Yes No   Sig: Take 30 mg by mouth daily   pramipexole (MIRAPEX) 0 25 mg tablet  Self Yes No   Sig: Take 0 25 mg by mouth daily   torsemide (DEMADEX) 5 MG tablet  Self No No   Sig: Take 1 tablet (5 mg total) by mouth daily   vitamin E, tocopherol, 1,000 units capsule  Self Yes No   Sig: Take 2,000 Units by mouth daily       Facility-Administered Medications: None       Past Medical History:   Diagnosis Date    Anxiety     Arthritis     Diabetes mellitus ([facility] )     Family history of thyroid problem     Heart burn     Hyperplasia, parathyroid ([facility] )     Hypertension     Kidney problem     Seasonal allergies     Sleep apnea     Swollen ankles        Past Surgical History:   Procedure Laterality Date    ARTHROSCOPY KNEE      BREAST BIOPSY      CHOLECYSTECTOMY      HIATAL HERNIA REPAIR      LIPOSUCTION      REDUCTION MAMMAPLASTY      SQUAMOUS CELL CARCINOMA EXCISION      TOE SURGERY      TONSILLECTOMY      TUBAL LIGATION         Family History   Problem Relation Age of Onset    Heart disease Mother     Heart disease Father     Cancer Father         lung cancer     I have reviewed and agree with the history as documented  Social History     Tobacco Use    Smoking status: Former Smoker     Packs/day: 2 00     Years: 10 00     Pack years: 20 00     Types: Cigarettes     Last attempt to quit:      Years since quittin 9    Smokeless tobacco: Never Used    Tobacco comment: quit in    Substance Use Topics    Alcohol use: Yes     Frequency: Monthly or less     Drinks per session: 1 or 2     Binge frequency: Never     Comment: rare    Drug use: No        Review of Systems   Constitutional: Negative  Negative for activity change, appetite change, chills, diaphoresis, fatigue and fever  HENT: Negative  Negative for congestion, drooling, facial swelling, sinus pressure, sinus pain, sore throat, tinnitus, trouble swallowing and voice change  Eyes: Negative  Negative for photophobia and visual disturbance  Respiratory: Negative  Negative for cough, choking, chest tightness, shortness of breath, wheezing and stridor  Cardiovascular: Positive for leg swelling  Negative for chest pain and palpitations  Gastrointestinal: Negative  Negative for abdominal distention, abdominal pain, anal bleeding, blood in stool, diarrhea, nausea and vomiting  Endocrine: Negative  Genitourinary: Negative  Negative for difficulty urinating, dysuria, flank pain, hematuria, urgency, vaginal bleeding, vaginal discharge and vaginal pain  Musculoskeletal: Negative    Negative for back pain, neck pain and neck stiffness  Skin: Negative  Negative for rash and wound  Allergic/Immunologic: Negative  Neurological: Negative  Negative for dizziness, tremors, seizures, syncope, facial asymmetry, speech difficulty, weakness, light-headedness, numbness and headaches  Hematological: Negative  Does not bruise/bleed easily  Psychiatric/Behavioral: Negative  Negative for confusion  Physical Exam  Physical Exam   Constitutional: She is oriented to person, place, and time  She appears well-developed and well-nourished  No distress  HENT:   Head: Normocephalic and atraumatic  Eyes: Pupils are equal, round, and reactive to light  EOM are normal    Neck: Normal range of motion  Neck supple  Cardiovascular: Normal rate, regular rhythm, normal heart sounds and intact distal pulses  Pulmonary/Chest: Effort normal and breath sounds normal  No stridor  No respiratory distress  She has no wheezes  She has no rales  She exhibits no tenderness  Abdominal: Soft  Bowel sounds are normal  She exhibits no distension and no mass  There is no tenderness  There is no rebound and no guarding  No hernia  Musculoskeletal: Normal range of motion  She exhibits edema  She exhibits no tenderness or deformity  Very minimal leg edema  Neurological: She is alert and oriented to person, place, and time  She has normal reflexes  She displays normal reflexes  No cranial nerve deficit or sensory deficit  She exhibits normal muscle tone  Coordination normal    Skin: Skin is warm and dry  Capillary refill takes less than 2 seconds  No rash noted  She is not diaphoretic  No erythema  No pallor  Psychiatric: She has a normal mood and affect  Her behavior is normal  Judgment and thought content normal    Nursing note and vitals reviewed        Vital Signs  ED Triage Vitals [11/30/19 1621]   Temperature Pulse Respirations Blood Pressure SpO2   98 2 °F (36 8 °C) 62 16 (!) 187/88 96 %      Temp Source Heart Rate Source Patient Position - Orthostatic VS BP Location FiO2 (%)   Oral Monitor Sitting Left arm --      Pain Score       No Pain           Vitals:    11/30/19 1621 11/30/19 1740   BP: (!) 187/88 150/72   Pulse: 62 61   Patient Position - Orthostatic VS: Sitting          Visual Acuity      ED Medications  Medications - No data to display    Diagnostic Studies  Results Reviewed     None                 No orders to display              Procedures  Procedures       ED Course  ED Course as of Nov 30 1855   Sat Nov 30, 2019   1840 Pt stable for d/c  Will continue taking all current medications and follow up with her family physician  Discussed signs and symptoms that would require return to the emergency department  MDM    Disposition  Final diagnoses:   Leg swelling   Bilateral leg edema     Time reflects when diagnosis was documented in both MDM as applicable and the Disposition within this note     Time User Action Codes Description Comment    11/30/2019  6:48 PM Tristan Jeronimo Add [M79 [id number]] Leg swelling     11/30/2019  6:48 PM Herring, Alveria Felty Add [R60 0] Bilateral leg edema       ED Disposition     ED Disposition Condition Date/Time Comment    Discharge Stable Sat Nov 30, 2019  6:48 PM Tu Moss discharge to home/self care  Follow-up Information     Follow up With Specialties Details Why Contact Wanda Olmedo DO Family Medicine Schedule an appointment as soon as possible for a visit   [address] [facility] [address] [place] [address]   [phone]            Patient's Medications   Discharge Prescriptions    No medications on file     No discharge procedures on file      ED Provider  Electronically Signed by           Kennedy Pringle MD  11/30/19 1233

## 2019-12-05 ENCOUNTER — DOCUMENTATION (OUTPATIENT)
Dept: NEPHROLOGY | Facility: CLINIC | Age: 69
End: 2019-12-05

## 2019-12-05 LAB
EXT GLUCOSE BLD: 184
EXTERNAL BUN: 48
EXTERNAL CALCIUM: 9.5
EXTERNAL CHLORIDE: 104
EXTERNAL CO2: 28
EXTERNAL CREATININE: 1.89
EXTERNAL EGFR: 27
EXTERNAL POTASSIUM: 4.6
EXTERNAL SODIUM: 140

## 2019-12-06 ENCOUNTER — OFFICE VISIT (OUTPATIENT)
Dept: NEPHROLOGY | Facility: CLINIC | Age: 69
End: 2019-12-06
Payer: MEDICARE

## 2019-12-06 VITALS
HEIGHT: 64 IN | HEART RATE: 68 BPM | DIASTOLIC BLOOD PRESSURE: 68 MMHG | BODY MASS INDEX: 28.85 KG/M2 | SYSTOLIC BLOOD PRESSURE: 133 MMHG | WEIGHT: 169 LBS

## 2019-12-06 DIAGNOSIS — I12.9 HYPERTENSIVE CHRONIC KIDNEY DISEASE WITH STAGE 1 THROUGH STAGE 4 CHRONIC KIDNEY DISEASE, OR UNSPECIFIED CHRONIC KIDNEY DISEASE: Primary | ICD-10-CM

## 2019-12-06 DIAGNOSIS — N18.30 CHRONIC KIDNEY DISEASE, STAGE III (MODERATE) (HCC): ICD-10-CM

## 2019-12-06 PROCEDURE — 99213 OFFICE O/P EST LOW 20 MIN: CPT | Performed by: PHYSICIAN ASSISTANT

## 2019-12-06 RX ORDER — AMLODIPINE BESYLATE 5 MG/1
5 TABLET ORAL 2 TIMES DAILY
Qty: 180 TABLET | Refills: 3 | Status: SHIPPED | OUTPATIENT
Start: 2019-12-06 | End: 2020-04-13 | Stop reason: SDUPTHER

## 2019-12-06 NOTE — PROGRESS NOTES
Assessment and Plan:    Diagnoses and all orders for this visit:    Hypertensive chronic kidney disease with stage 1 through stage 4 chronic kidney disease, or unspecified chronic kidney disease  -     amLODIPine (NORVASC) 5 mg tablet; Take 1 tablet (5 mg total) by mouth 2 (two) times a day    Chronic kidney disease, stage III (moderate) (HCC)      Chronic Kidney Disease stage III- Baseline creatinine is 1 5-2  Hypertension- Antihypertensive regimen includes torsemide 5mg daily, metoprolol 50mg twice a day, losartan 50mg daily, and amlodipine 7 5mg daily  Avoid salt in your diet  Exercise as able  Avoid nonsteroidals  Goal blood pressure is <130/80  Secondary workup was negative in the past   BP close to goal   Will slightly increase amlodipine to 5mg twice a day  Continue to monitor your blood pressure at home  Call with any dizziness/lightheadedness or with LE edema  Home blood pressure cuff correlation: -1 / -8    Follow up with Dr Frederic Morales in 3 months  Please call the office with any questions or concerns  Colonoscopy: completed approximately 2 years ago    Reason for Visit: No chief complaint on file  HPI: Allyssa Tuttle is a 78 y o  female who is here for follow up of hypertension  At her last appointment with Dr Frederic Morales, her BP was elevated and she had LE edema; therefore, she was instructed to stop chlorthalidone and start torsemide 5mg daily  She feels her LE edema is improved but she did have a flare up after Thanksgiving  That is now resolved and she has very mild LE edema  ROS: A complete review of systems was performed and was negative unless otherwise noted in the history of present illness      Allergies:   Ciprofloxacin    Medications:     Current Outpatient Medications:     albuterol (VENTOLIN HFA) 90 mcg/act inhaler, , Disp: , Rfl:     amLODIPine (NORVASC) 5 mg tablet, Take 1 tablet (5 mg total) by mouth 2 (two) times a day, Disp: 180 tablet, Rfl: 3    b complex vitamins tablet, Take 1 tablet by mouth daily , Disp: , Rfl:     B-D ULTRAFINE III SHORT PEN 31G X 8 MM MISC, USE AS DIRECTED 4 TIMES PER DAY, Disp: , Rfl: 3    Calcium Carbonate (CALCIUM 600 PO), Take 1 capsule by mouth 2 (two) times a day, Disp: , Rfl:     cyclobenzaprine (FLEXERIL) 10 mg tablet, Take 10 mg by mouth daily, Disp: , Rfl:     Dulaglutide (TRULICITY) 9 15 LH/6 9AO SOPN, Inject 0 75 mg under the skin once a week, Disp: , Rfl:     DULoxetine (CYMBALTA) 60 mg delayed release capsule, Take 60 mg by mouth daily, Disp: , Rfl:     gabapentin (NEURONTIN) 300 mg capsule, Take 300 mg by mouth Take 300 mg with dinner and 600mg at bedtime , Disp: , Rfl:     glimepiride (AMARYL) 2 mg tablet, Take 4 mg by mouth 2 (two) times a day , Disp: , Rfl:     insulin detemir (LEVEMIR) 100 units/mL subcutaneous injection, Inject 30 Units under the skin every morning , Disp: , Rfl:     levothyroxine 175 mcg tablet, Take 175 mcg by mouth daily, Disp: , Rfl:     losartan (COZAAR) 50 mg tablet, Take 50 mg by mouth daily , Disp: , Rfl:     Magnesium 500 MG CAPS, Take 1 capsule by mouth 3 (three) times a day, Disp: , Rfl:     methocarbamol (ROBAXIN) 500 mg tablet, Take 500 mg by mouth daily at bedtime as needed, Disp: , Rfl: 2    METHOCARBAMOL PO, Take by mouth daily at bedtime, Disp: , Rfl:     metoprolol tartrate (LOPRESSOR) 50 mg tablet, Take 50 mg by mouth every 12 (twelve) hours  , Disp: , Rfl:     Multiple Vitamin (MULTIVITAMIN) capsule, Take 1 capsule by mouth daily, Disp: , Rfl:     NOVOLOG FLEXPEN 100 units/mL injection pen, PLEASE SEE ATTACHED FOR DETAILED DIRECTIONS, Disp: , Rfl: 0    Omega-3 Fatty Acids (FISH OIL) 1,000 mg, Take 1,000 mg by mouth daily, Disp: , Rfl:     omega-3-acid ethyl esters (LOVAZA) 1 g capsule, , Disp: , Rfl:     omeprazole (PriLOSEC) 40 MG capsule, Take 40 mg by mouth daily , Disp: , Rfl:     pioglitazone (ACTOS) 30 mg tablet, Take 30 mg by mouth daily, Disp: , Rfl:     pramipexole (MIRAPEX) 0 25 mg tablet, Take 0 25 mg by mouth daily, Disp: , Rfl:     torsemide (DEMADEX) 5 MG tablet, Take 1 tablet (5 mg total) by mouth daily, Disp: 30 tablet, Rfl: 5    Vitamin D, Ergocalciferol, 2000 units CAPS, Take 2,000 Units by mouth daily , Disp: , Rfl:     vitamin E, tocopherol, 1,000 units capsule, Take 2,000 Units by mouth daily , Disp: , Rfl:     colesevelam (WELCHOL) 625 mg tablet, Take 3 tablets (1,875 mg total) by mouth 2 (two) times a day with meals (Patient not taking: Reported on 11/18/2019), Disp: 180 tablet, Rfl: 3    ergocalciferol (VITAMIN D2) 50,000 units, Take 1 capsule (50,000 Units total) by mouth once a week for 12 doses, Disp: 12 capsule, Rfl: 0    Past Medical History:   Diagnosis Date    Anxiety     Arthritis     Diabetes mellitus ([facility] )     Family history of thyroid problem     Heart burn     Hyperplasia, parathyroid ([facility] [address] [facility]     Hypertension     Kidney problem     Seasonal allergies     Sleep apnea     Swollen ankles      Past Surgical History:   Procedure Laterality Date    ARTHROSCOPY KNEE      BREAST BIOPSY      CHOLECYSTECTOMY      HIATAL HERNIA REPAIR      LIPOSUCTION      REDUCTION MAMMAPLASTY      SQUAMOUS CELL CARCINOMA EXCISION      TOE SURGERY      TONSILLECTOMY      TUBAL LIGATION       Family History   Problem Relation Age of Onset    Heart disease Mother     Heart disease Father     Cancer Father         lung cancer      reports that she quit smoking about 43 years ago  Her smoking use included cigarettes  She has a 20 00 pack-year smoking history  She has never used smokeless tobacco  She reports that she drinks alcohol  She reports that she does not use drugs      Physical Exam:   Vitals:    12/06/19 1406 12/06/19 1407 12/06/19 1408 12/06/19 1409   BP: 138/66 133/73 130/62 133/68   BP Location: Right arm Left arm Left arm Right arm   Patient Position: Sitting Sitting Sitting Sitting   Cuff Size: Standard Standard Standard Standard   Pulse: 68 68     Weight:       Height:         Body mass index is 29 01 kg/m²  General: NAD  Neuro: AAO  Skin: no rash  Eyes: anicteric  ENMT: mm moist  Neck: supple  Cardiovascular: RRR  Extremities: trace bilateral LE edema  Respiratory: CTAB  Gastrointestinal: soft nt nd      Procedure:  No results found for this or any previous visit  Labs reviewed      Lab Results   Component Value Date    GLUCOSE 168 (H) 11/30/2018    CALCIUM 9 5 12/05/2019    K 4 6 12/05/2019    CO2 28 12/05/2019     12/05/2019    BUN 48 12/05/2019    CREATININE 1 89 12/05/2019       Results from last 7 days   Lab Units 12/05/19   POTASSIUM  4 6   CHLORIDE  104   CO2  28   BUN  48   CREATININE  1 89   CALCIUM  9 5

## 2019-12-06 NOTE — PATIENT INSTRUCTIONS
Chronic Kidney Disease stage III- Baseline creatinine is 1 5-2  Hypertension- Antihypertensive regimen includes torsemide 5mg daily, metoprolol 50mg twice a day, losartan 50mg daily, and amlodipine 7 5mg daily  Avoid salt in your diet  Exercise as able  Avoid nonsteroidals  Goal blood pressure is <130/80  Secondary workup was negative in the past   BP close to goal   Will slightly increase amlodipine to 5mg twice a day  Continue to monitor your blood pressure at home  Call with any dizziness/lightheadedness or with LE edema  Follow up with Dr Cecilia Benitez in 3 months  Please call the office with any questions or concerns

## 2020-02-20 ENCOUNTER — OFFICE VISIT (OUTPATIENT)
Dept: CARDIOLOGY CLINIC | Facility: CLINIC | Age: 70
End: 2020-02-20
Payer: MEDICARE

## 2020-02-20 VITALS
BODY MASS INDEX: 31.41 KG/M2 | SYSTOLIC BLOOD PRESSURE: 138 MMHG | OXYGEN SATURATION: 92 % | WEIGHT: 184 LBS | DIASTOLIC BLOOD PRESSURE: 70 MMHG | HEART RATE: 63 BPM | HEIGHT: 64 IN

## 2020-02-20 DIAGNOSIS — R06.00 DYSPNEA ON EXERTION: ICD-10-CM

## 2020-02-20 DIAGNOSIS — I27.20 PULMONARY HYPERTENSION (HCC): ICD-10-CM

## 2020-02-20 DIAGNOSIS — I10 ESSENTIAL HYPERTENSION: Primary | ICD-10-CM

## 2020-02-20 DIAGNOSIS — E78.2 MIXED HYPERLIPIDEMIA: ICD-10-CM

## 2020-02-20 PROCEDURE — 99214 OFFICE O/P EST MOD 30 MIN: CPT | Performed by: INTERNAL MEDICINE

## 2020-02-20 RX ORDER — OXYBUTYNIN CHLORIDE 5 MG/1
10 TABLET ORAL DAILY
COMMUNITY
End: 2020-05-14

## 2020-02-20 NOTE — PROGRESS NOTES
Cardiology Consultation     Rhea King  [id number]  1950  [address] [facility] [place] [address]    1  Essential hypertension     2  Dyspnea on exertion     3  Mixed hyperlipidemia     4  Pulmonary hypertension [facility])       Chief Complaint   Patient presents with    Follow-up     3 mo     Ankle Swelling     pt stated if on feet for long periods of time      HPI: Patient is here for continued cardiac evaluation of RAGSDALE and palpitations that also occur with exertion  Has been going on for at least the past 6 months  Has not necessarily been getting progressively worse  she has had a few surgeries, and so she has been unable to push herself  She is no longer in a boot for her RLE  Has bilateral LE edema if she's on her feet for prolonged periods of time  No reported chest pain,  lightheadedness, syncope, orthopnea, PND, or significant weight changes  No significant change to symptoms since last visit    No longer taking Welchol as per her PMD      Patient Active Problem List   Diagnosis    Chronic kidney disease, stage III (moderate) ([address] )    Hypertensive chronic kidney disease with stage 1 through stage 4 chronic kidney disease, or unspecified chronic kidney disease    BARBARA on CPAP    RLS (restless legs syndrome)    Persistent proteinuria    Diabetic nephropathy associated with type 2 diabetes mellitus ([address] )    Pulmonary hypertension (HCC)    Dyspnea    Mixed hyperlipidemia    Essential hypertension     Past Medical History:   Diagnosis Date    Anxiety     Arthritis     Diabetes mellitus ([address]     Family history of thyroid problem     Heart burn     Hyperplasia, parathyroid ([address] )     Hypertension     Kidney problem     Seasonal allergies     Sleep apnea     Swollen ankles      Social History     Socioeconomic History    Marital status: /Civil Union     Spouse name: Not on file    Number of children: Not on file    Years of education: Not on file    Highest education level: Not on file   Occupational History    Not on file   Social Needs    Financial resource strain: Not on file    Food insecurity:     Worry: Not on file     Inability: Not on file    Transportation needs:     Medical: Not on file     Non-medical: Not on file   Tobacco Use    Smoking status: Former Smoker     Packs/day: 2 00     Years: 10 00     Pack years: 20 00     Types: Cigarettes     Last attempt to quit:      Years since quittin 1    Smokeless tobacco: Never Used    Tobacco comment: quit in    Substance and Sexual Activity    Alcohol use: Yes     Frequency: Monthly or less     Drinks per session: 1 or 2     Binge frequency: Never     Comment: rare    Drug use: No    Sexual activity: Not on file   Lifestyle    Physical activity:     Days per week: Not on file     Minutes per session: Not on file    Stress: Not on file   Relationships    Social connections:     Talks on phone: Not on file     Gets together: Not on file     Attends Restorationism service: Not on file     Active member of club or organization: Not on file     Attends meetings of clubs or organizations: Not on file     Relationship status: Not on file    Intimate partner violence:     Fear of current or ex partner: Not on file     Emotionally abused: Not on file     Physically abused: Not on file     Forced sexual activity: Not on file   Other Topics Concern    Not on file   Social History Narrative    Not on file      Family History   Problem Relation Age of Onset    Heart disease Mother     Heart disease Father     Cancer Father         lung cancer     Past Surgical History:   Procedure Laterality Date    ARTHROSCOPY KNEE      BREAST BIOPSY      CHOLECYSTECTOMY      HIATAL HERNIA REPAIR      LIPOSUCTION      REDUCTION MAMMAPLASTY      SQUAMOUS CELL CARCINOMA EXCISION      TOE SURGERY      TONSILLECTOMY      TUBAL LIGATION         Current Outpatient Medications:     amLODIPine (NORVASC) 5 mg tablet, Take 1 tablet (5 mg total) by mouth 2 (two) times a day, Disp: 180 tablet, Rfl: 3    b complex vitamins tablet, Take 1 tablet by mouth daily , Disp: , Rfl:     B-D ULTRAFINE III SHORT PEN 31G X 8 MM MISC, USE AS DIRECTED 4 TIMES PER DAY, Disp: , Rfl: 3    Calcium Carbonate (CALCIUM 600 PO), Take 1 capsule by mouth 2 (two) times a day, Disp: , Rfl:     DULoxetine (CYMBALTA) 60 mg delayed release capsule, Take 60 mg by mouth daily , Disp: , Rfl:     ergocalciferol (VITAMIN D2) 50,000 units, Take 1 capsule (50,000 Units total) by mouth once a week for 12 doses, Disp: 12 capsule, Rfl: 0    gabapentin (NEURONTIN) 300 mg capsule, Take 300 mg by mouth Take 300 mg with dinner and 600mg at bedtime , Disp: , Rfl:     glimepiride (AMARYL) 2 mg tablet, Take 4 mg by mouth 2 (two) times a day , Disp: , Rfl:     insulin detemir (LEVEMIR) 100 units/mL subcutaneous injection, Inject 30 Units under the skin every morning , Disp: , Rfl:     levothyroxine 175 mcg tablet, Take 175 mcg by mouth daily, Disp: , Rfl:     losartan (COZAAR) 50 mg tablet, Take 50 mg by mouth daily , Disp: , Rfl:     Magnesium 500 MG CAPS, Take 1 capsule by mouth 3 (three) times a day, Disp: , Rfl:     methocarbamol (ROBAXIN) 500 mg tablet, Take 500 mg by mouth daily at bedtime as needed, Disp: , Rfl: 2    metoprolol tartrate (LOPRESSOR) 50 mg tablet, Take 50 mg by mouth every 12 (twelve) hours  , Disp: , Rfl:     Multiple Vitamin (MULTIVITAMIN) capsule, Take 1 capsule by mouth daily, Disp: , Rfl:     NOVOLOG FLEXPEN 100 units/mL injection pen, PLEASE SEE ATTACHED FOR DETAILED DIRECTIONS, Disp: , Rfl: 0    omega-3-acid ethyl esters (LOVAZA) 1 g capsule, , Disp: , Rfl:     omeprazole (PriLOSEC) 40 MG capsule, Take 40 mg by mouth daily , Disp: , Rfl:     oxybutynin (DITROPAN) 5 mg tablet, Take 5 mg by mouth daily, Disp: , Rfl:     pioglitazone (ACTOS) 30 mg tablet, Take 30 mg by mouth daily, Disp: , Rfl:     pramipexole (MIRAPEX) 0 25 mg tablet, Take 0 25 mg by mouth daily, Disp: , Rfl:     torsemide (DEMADEX) 5 MG tablet, Take 1 tablet (5 mg total) by mouth daily, Disp: 30 tablet, Rfl: 5    Vitamin D, Ergocalciferol, 2000 units CAPS, Take 2,000 Units by mouth daily , Disp: , Rfl:     vitamin E, tocopherol, 1,000 units capsule, Take 2,000 Units by mouth daily , Disp: , Rfl:     albuterol (VENTOLIN HFA) 90 mcg/act inhaler, , Disp: , Rfl:     cyclobenzaprine (FLEXERIL) 10 mg tablet, Take 10 mg by mouth daily, Disp: , Rfl:     Dulaglutide (TRULICITY) 0 86 DI/5 9LG SOPN, Inject 0 75 mg under the skin once a week, Disp: , Rfl:     Omega-3 Fatty Acids (FISH OIL) 1,000 mg, Take 1,000 mg by mouth daily, Disp: , Rfl:   Allergies   Allergen Reactions    Ciprofloxacin Hives     Vitals:    02/20/20 0856   BP: 138/70   BP Location: Right arm   Patient Position: Sitting   Cuff Size: Standard   Pulse: 63   SpO2: 92%   Weight: 83 5 kg (184 lb)   Height: 5' 4" (1 626 m)       Labs:  Documentation on 12/05/2019   Component Date Value    SODIUM 12/05/2019 140     POTASSIUM 12/05/2019 4 6     CHLORIDE 12/05/2019 104     CO2 12/05/2019 28     BUN 12/05/2019 48     CREATININE 12/05/2019 1 89     Glucose 12/05/2019 184     EXTERNAL CALCIUM 12/05/2019 9 5     EXTERNAL EGFR 12/05/2019 27    Documentation on 11/06/2019   Component Date Value    SODIUM 11/04/2019 142     POTASSIUM 11/04/2019 4 2     CHLORIDE 11/04/2019 106     CO2 11/04/2019 28     BUN 11/04/2019 41     CREATININE 11/04/2019 1 72     Glucose 11/04/2019 147     EXTERNAL CALCIUM 11/04/2019 9 3     TOTAL PROTEIN 11/04/2019 6 3     ALBUMIN 11/04/2019 3 6     AST 11/04/2019 20     ALT 11/04/2019 28     ALK PHOS 11/04/2019 51     EXTERNAL TOT   BILIRUBIN 11/04/2019 0 3     EXTERNAL EGFR 11/04/2019 30     Total CK 11/04/2019 53     Prot/Creat Ratio, Ur 11/04/2019 0 53*    Magnesium 11/04/2019 2 1     Phosphorus 11/04/2019 3 9     Hemoglobin 11/04/2019 11 8     Hematocrit 11/04/2019 35 6     WBC 11/04/2019 6 50     Platelets 03/77/8804 268    Orders Only on 11/04/2019   Component Date Value    PROTEIN UA 11/04/2019 32 3     EXT Creatinine Urine 11/04/2019 60 6     EXTERNAL Ur Prot/Creat R* 11/04/2019 0 53      No results found for: CHOL, TRIG, HDL, LDLDIRECT  Imaging: No results found  Review of Systems:  Review of Systems   Constitutional: Negative for activity change, appetite change, chills, diaphoresis, fatigue and unexpected weight change  HENT: Negative for hearing loss, nosebleeds and sore throat  Eyes: Negative for photophobia and visual disturbance  Respiratory: Positive for shortness of breath  Negative for cough, chest tightness and wheezing  Cardiovascular: Positive for palpitations  Negative for chest pain and leg swelling  Gastrointestinal: Negative for abdominal pain, diarrhea, nausea and vomiting  Endocrine: Negative for polyuria  Genitourinary: Negative for dysuria, frequency and hematuria  Musculoskeletal: Negative for arthralgias, back pain, gait problem and neck pain  Skin: Negative for pallor and rash  Neurological: Negative for dizziness, syncope and headaches  Hematological: Does not bruise/bleed easily  Psychiatric/Behavioral: Negative for behavioral problems and confusion  Physical Exam:  Physical Exam   Constitutional: She is oriented to person, place, and time  She appears well-developed and well-nourished  HENT:   Head: Normocephalic and atraumatic  Nose: Nose normal    Eyes: Pupils are equal, round, and reactive to light  EOM are normal  No scleral icterus  Neck: Normal range of motion  Neck supple  No JVD present  Cardiovascular: Normal rate, regular rhythm and normal heart sounds  Exam reveals no gallop and no friction rub  No murmur heard  Pulmonary/Chest: Effort normal and breath sounds normal  No respiratory distress  She has no wheezes  She has no rales  Abdominal: Soft  Bowel sounds are normal  She exhibits no distension  There is no tenderness  Musculoskeletal: Normal range of motion  She exhibits no edema or deformity  Neurological: She is alert and oriented to person, place, and time  No cranial nerve deficit  Skin: Skin is warm and dry  No rash noted  She is not diaphoretic  Psychiatric: She has a normal mood and affect  Her behavior is normal    Vitals reviewed  Blood pressure 138/70, pulse 63, height 5' 4" (1 626 m), weight 83 5 kg (184 lb), SpO2 92 %  Discussion/Summary:  RAGSDALE: unclear etiology - perhaps related to deconditioning  Relatively normal echo in June 2018 without significant structural/valvular issues  She does have risk factors of age, HTN, HLD, post-menopausal, family history of CAD, former smoker and had a stress test in June 2018 for which we will obtain results  We also checked a Holter with palpitations with exertion - this revealed no significant arrhythmias  Encouraged conditioning and starting an exercise regimen  Pulm HTN: PASP noted to be 31 mmHg in June 2018 - which is not in the range of pulm HTN, so unlikely to be contributing to symptoms  HTN: continued on amlodipine, much improved today - with increase to 5mg twice daily - is being managed by Dr Manuel Mckinney from nephrology  HLD: continued on zetia and statin with an LDL of 6 in May 2019  TG are 307 - which will not be helped by the statin or zetia  We stopped both and started on wellchol instead of Tricor since that hasn't done much - but stopped by her PCP  Continues on Lovaza  Recheck revealed TG of 215, which is much improved  This will be continued to be followed by her PCP

## 2020-02-20 NOTE — PATIENT INSTRUCTIONS
Hyperlipidemia   AMBULATORY CARE:   Hyperlipidemia  is a high level of lipids (fats) in your blood  These lipids include cholesterol or triglycerides  Lipids are made by your body  They also come from the foods you eat  Your body needs lipids to work properly, but high levels increase your risk for heart disease, heart attack, and stroke  Call 911 for any of the following:   · You have any of the following signs of a heart attack:      ¨ Squeezing, pressure, or pain in your chest that lasts longer than 5 minutes or returns    ¨ Discomfort or pain in your back, neck, jaw, stomach, or arm     ¨ Trouble breathing    ¨ Nausea or vomiting    ¨ Lightheadedness or a sudden cold sweat, especially with chest pain or trouble breathing    · You have any of the following signs of a stroke:      ¨ Numbness or drooping on one side of your face     ¨ Weakness in an arm or leg    ¨ Confusion or difficulty speaking    ¨ Dizziness, a severe headache, or vision loss  Contact your healthcare provider if:   · You have questions or concerns about your condition or care  Treatment of hyperlipidemia  may first include lifestyle changes to help decrease your lipid levels  You may also need to take medicine to lower your lipid levels  Some of the lifestyle changes you may need to make include the following:  · Maintain a healthy weight  Ask your healthcare provider how much you should weigh  Ask him or her to help you create a weight loss plan if you are overweight  Weight loss can decrease your cholesterol and triglyceride levels  · Exercise as directed  Exercise lowers your cholesterol levels and helps you maintain a healthy weight  Get 40 minutes or more of moderate exercise 3 to 4 days each week  You can split your exercise into four 10-minute workouts instead of 40 minutes at one time  Examples of moderate exercises include walking briskly, swimming, or riding a bike   Work with your healthcare provider to plan the best exercise program for you  · Do not smoke  Nicotine and other chemicals in cigarettes and cigars can increase your risk for a heart attack and stroke  Ask your healthcare provider for information if you currently smoke and need help to quit  E-cigarettes or smokeless tobacco still contain nicotine  Talk to your healthcare provider before you use these products  · Eat heart-healthy foods  Talk to your dietitian about a heart-healthy diet  The following will help you manage hyperlipidemia:     ¨ Decrease the total amount of fat you eat  Choose lean meats, fat-free or 1% fat milk, and low-fat dairy products, such as yogurt and cheese  Limit or do not eat red meat  Red meats are high in fat and cholesterol  ¨ Replace unhealthy fats with healthy fats  Unhealthy fats include saturated fat, trans fat, and cholesterol  Choose soft margarines that are low in saturated fat and have little or no trans fat  Monounsaturated fats are healthy fats  These are found in olive oil, canola oil, avocado, and nuts  Polyunsaturated fats are also healthy  These are found in fish, flaxseed, walnuts, and soybeans  ¨ Eat fruits and vegetables every day  They are low in calories and fat and a good source of essential vitamins  Include dark green, red, and orange vegetables  Examples include spinach, kale, broccoli, and carrots  ¨ Eat foods high in fiber  Choose whole grain, high-fiber foods  Good choices include whole-wheat breads or cereals, beans, peas, fruits, and vegetables  · Ask your healthcare provider if it is safe for you to drink alcohol  Alcohol can increase your cholesterol and triglyceride levels  A drink of alcohol is 12 ounces of beer, 5 ounces of wine, or 1½ ounces of liquor  Follow up with your healthcare provider as directed: You may need to return for more tests  Your healthcare provider may refer you to a dietitian  Write down your questions so you remember to ask them during your visits     © 2017 2020 [facility] Information is for End User's use only and may not be sold, redistributed or otherwise used for commercial purposes  All illustrations and images included in CareNotes® are the copyrighted property of [employer]  or [employer]  The above information is an  only  It is not intended as medical advice for individual conditions or treatments  Talk to your doctor, nurse or pharmacist before following any medical regimen to see if it is safe and effective for you

## 2020-03-06 LAB
CREAT ?TM UR-SCNC: 51.6 UMOL/L
CREAT ?TM UR-SCNC: 53.2 UMOL/L
EXT MICROALBUMIN URINE RANDOM: 13.2
EXT PROTEIN URINE: 19.7
HBA1C MFR BLD HPLC: 7.1 %
MICROALBUMIN/CREAT UR: 248.1 MG/G{CREAT}
PROT/CREAT UR: 0.38 MG/G{CREAT}

## 2020-03-06 NOTE — PROGRESS NOTES
RENAL FOLLOW UP NOTE: td    ASSESSMENT AND PLAN:  1   CKD stage 3 :  · Etiology:  Diabetic nephropathy/hypertensive nephrosclerosis/arteriolar nephrosclerosis/chronic NSAID use   No evidence of obstructive uropathy, renal artery disease or primary glomerular disease with a bland urinalysis  Of note, CPK was normal at 105 no evidence rhabdomyolysis  · Baseline creatinine:  1 5-2 0  · Current creatinine:  1 86 at baseline  · Urine protein creatinine ratio:  0 38 g at goal  Recommendations:  · Treat hypertension-please see below  · Treat dyslipidemia-please see below  · Maintain proteinuria less than 1 g or as low as possible  · Avoid nephrotoxic agents such as NSAIDs, patient counseled as such  2   Volume:  Euvolemic  3   Hypertension:  Workup:  · saline suppression test for primary aldosterone state was normal  · plasma free metanephrines was negative  · renal artery duplex was negative 02/2019       Current blood pressure averages:   A m :  145/90 without orthostatic changes  P m :  143/86, without orthostatic changes  Heart rate:  · Goal blood pressure:  less than 130/80  Recommendations:  · Push nonmedical regimen including weight loss, isotonic exercise and avoidance of salt; patient counseled as such  · Medication changes today:  increase torsemide to 10 mg daily recheck labs 1-2 weeks and blood pressure recheck 3-4 weeks    4   Electrolytes:  all acceptable including a potassium of 4 6  5   Mineral bone disorder:  · Calcium/magnesium/phosphorus:  All acceptable  · PTH intact:  42 4 acceptable  · Vitamin-D:  35:  From last visit recheck next visit  6   Dyslipidemia:  · Goal LDL:  Less than 100  · Current lipid profile:  LDL 84/HDL 44/triglycerides 214; from last visit recheck next visit  Recommendations:  Per Endocrinology  7   Anemia:  Hemoglobin:  Normal at 12 5  8   Other problems:  · Depression  · Diabetes mellitus per primary medical physician  · Hypothyroidism  · BARBARA on CPAP  · Fibromyalgia/osteoarthritis  · Nephrolithiasis  · Basal cell/squamous CA of the skin  · Urinary stress incontinence  · Status post incisional hernia repairs       PATIENT INSTRUCTIONS:    Patient Instructions   1  In general labs look quite good  2  Medication changes today:  · Increase torsemide to 2-5 mg tablets equivalent to 10 mg daily in the morning  3  Please wait 1-2 weeks after making the above medicatio change and go for nonfasting lab work    4  Please wait 3-4 weeks after making the above medication change and take 1 week a blood pressure readings morning evening, sitting and standing as follows:  · Take the morning readings before any medications  · Take the evening readings before supper and before taking any medications at that time  · When taking standing readings, keep your arm supported at heart level and not dangling  · Make sure you are sitting with your back supported in feet on the ground on crossed  · Make sure you have not taken any coffee or caffeine products or exercised or smoke cigarettes at least 30 minutes before taking your blood pressure  Then please send those blood pressure readings into the office    5  For your leg cramps at night:  · Continue taking vitamin-B complex he may need to take it 2 to 3 times a day  · Try foot/leg stretching before bedtime    · Hydrate well    6  Measures to help with your leg swelling:   Please avoid salt as much as possible   Keep your legs elevated whenever seated   Use support hose knee-high that you can purchase at a  medical supply company or drug store when you are on her feet for a significant portion of the day   Adjust your water pill diuretics as follows: Torsemide 10 mg daily as outlined above   PLEASE CALL IF YOUR SWELLING DOES NOT IMPROVE OR CERTAINLY WORSENS; OR YOU DEVELOPED A SIGNIFICANT WEIGHT GAIN OF GREATER THAN 3-4 LB IN A SHORT PERIOD OF TIME FOR EXAMPLE 1-2 DAYS THAT DOES NOT IMPROVE     6   Follow-up in 4 months:  · Please bring in 1 week a blood pressure readings morning evening, sitting and standing is outlined above  · Please go for fasting lab work 1-2 weeks prior to your appointment    7  General instructions:   AVOID SALT BUT NOT ADDING AN READING LABELS TO MAKE SURE THERE IS LOW-SALT IN THE FOOD THAT YOU ARE EATING     Avoid nonsteroidal anti-inflammatory drugs such as Naprosyn, ibuprofen, Aleve, Advil, Celebrex, etc   You can use Tylenol as needed if you do not have any liver condition to be concerned about     Avoid medications such as Sudafed or decongestants and antihistamines that contained the D component which is the decongestant  You can take antihistamines without the decongestant or D component   Try to avoid medications such as pantoprazole or  Protonix/Nexium or Esomeprazole)/Prilosec or omeprazole/Prevacid or lansoprazole/AcipHex or Rabeprazole  If you are able to, use Pepcid as this is safer for your kidneys   Try to exercise at least 30 minutes 3 days a week to begin with with an ultimate goal of 5 days a week for at least 30 minutes     Try to lose 5-10 lb by your next visit     Please do not drink more than 2 glasses of alcohol/wine on a daily basis as this may contribute to your high blood pressure  Subjective:   Patient is tired  Only other complaint is chronic ongoing cramps during the night    Her appetite is good  No fevers chills cough or colds  No nausea vomiting or diarrhea  No urinary symptoms including dysuria hematuria voiding symptoms foamy urine  No chest pain, shortness of breath but there is some leg edema mild especially if she has any sort of salty food and toward the evening if she is not elevated her legs  No headaches, dizziness or lightheadedness  Blood pressure medications:    ROS:  See HPI, otherwise review of systems as completely reviewed with the patient are negative    Past Medical History:   Diagnosis Date    Anxiety     Arthritis     Diabetes mellitus ([facility] [address] [facility]     Family history of thyroid problem     Heart burn     Hyperplasia, parathyroid ([facility] [address] [facility]     Hypertension     Kidney problem     Seasonal allergies     Sleep apnea     Swollen ankles      Past Surgical History:   Procedure Laterality Date    ARTHROSCOPY KNEE      BREAST BIOPSY      CHOLECYSTECTOMY      HIATAL HERNIA REPAIR      LIPOSUCTION      REDUCTION MAMMAPLASTY      SQUAMOUS CELL CARCINOMA EXCISION      TOE SURGERY      TONSILLECTOMY      TUBAL LIGATION       Family History   Problem Relation Age of Onset    Heart disease Mother     Heart disease Father    Glenn Moose Cancer Father         lung cancer      reports that she quit smoking about 44 years ago  Her smoking use included cigarettes  She has a 20 00 pack-year smoking history  She has never used smokeless tobacco  She reports that she drinks alcohol  She reports that she does not use drugs  I COMPLETELY REVIEWED THE PAST MEDICAL HISTORY/PAST SURGICAL HISTORY/SOCIAL HISTORY/FAMILY HISTORY/AND MEDICATIONS  AND UPDATED ALL    Objective:     Vitals: There were no vitals filed for this visit  Weight (last 2 days)     Date/Time   Weight    03/09/20 1522   80 4 (177 2)            Wt Readings from Last 3 Encounters:   03/09/20 80 4 kg (177 lb 3 2 oz)   02/20/20 83 5 kg (184 lb)   12/06/19 76 7 kg (169 lb)       Body mass index is 30 42 kg/m²      Physical Exam: General:  No acute distress  Skin:  No acute rash  Eyes:  No scleral icterus, noninjected, no discharge from eyes  ENT:  Moist mucous membranes  Neck:  Supple, no jugular venous distention, trachea is midline, no lymphadenopathy and no thyromegaly  Back   No CVAT  Chest:  Clear to auscultation and percussion, good respiratory effort  CVS:  Regular rate and rhythm without a rub, or gallops or murmurs  Abdomen:  Soft and nontender with normal bowel sounds  Extremities:  No cyanosis and no edema, no arthritic changes, normal range of motion  Neuro:  Grossly intact  Psych:  Alert, oriented x3 and appropriate      Medications:    Current Outpatient Medications:     amLODIPine (NORVASC) 5 mg tablet, Take 1 tablet (5 mg total) by mouth 2 (two) times a day (Patient taking differently: Take 5 mg by mouth daily ), Disp: 180 tablet, Rfl: 3    b complex vitamins tablet, Take 1 tablet by mouth daily , Disp: , Rfl:     B-D ULTRAFINE III SHORT PEN 31G X 8 MM MISC, USE AS DIRECTED 4 TIMES PER DAY, Disp: , Rfl: 3    losartan (COZAAR) 50 mg tablet, Take 50 mg by mouth 2 (two) times a day , Disp: , Rfl:     torsemide (DEMADEX) 5 MG tablet, Take 2 tablets (10 mg total) by mouth daily, Disp: 60 tablet, Rfl: 5    albuterol (VENTOLIN HFA) 90 mcg/act inhaler, , Disp: , Rfl:     Calcium Carbonate (CALCIUM 600 PO), Take 1 capsule by mouth 2 (two) times a day, Disp: , Rfl:     cyclobenzaprine (FLEXERIL) 10 mg tablet, Take 10 mg by mouth daily, Disp: , Rfl:     Dulaglutide (TRULICITY) 8 59 HI/5 0VR SOPN, Inject 0 75 mg under the skin once a week, Disp: , Rfl:     DULoxetine (CYMBALTA) 60 mg delayed release capsule, Take 60 mg by mouth daily , Disp: , Rfl:     gabapentin (NEURONTIN) 300 mg capsule, Take 300 mg by mouth Take 300 mg with dinner and 600mg at bedtime , Disp: , Rfl:     glimepiride (AMARYL) 2 mg tablet, Take 4 mg by mouth 2 (two) times a day , Disp: , Rfl:     insulin detemir (LEVEMIR) 100 units/mL subcutaneous injection, Inject 30 Units under the skin every morning , Disp: , Rfl:     levothyroxine 175 mcg tablet, Take 175 mcg by mouth daily, Disp: , Rfl:     Magnesium 500 MG CAPS, Take 1 capsule by mouth 3 (three) times a day, Disp: , Rfl:     methocarbamol (ROBAXIN) 500 mg tablet, Take 500 mg by mouth daily at bedtime as needed, Disp: , Rfl: 2    metoprolol tartrate (LOPRESSOR) 50 mg tablet, Take 50 mg by mouth every 12 (twelve) hours  , Disp: , Rfl:     Multiple Vitamin (MULTIVITAMIN) capsule, Take 1 capsule by mouth daily, Disp: , Rfl:     NOVOLOG FLEXPEN 100 units/mL injection pen, PLEASE SEE ATTACHED FOR DETAILED DIRECTIONS, Disp: , Rfl: 0    Omega-3 Fatty Acids (FISH OIL) 1,000 mg, Take 1,000 mg by mouth daily, Disp: , Rfl:     omega-3-acid ethyl esters (LOVAZA) 1 g capsule, , Disp: , Rfl:     omeprazole (PriLOSEC) 40 MG capsule, Take 40 mg by mouth daily , Disp: , Rfl:     oxybutynin (DITROPAN) 5 mg tablet, Take 5 mg by mouth daily, Disp: , Rfl:     pioglitazone (ACTOS) 30 mg tablet, Take 30 mg by mouth daily, Disp: , Rfl:     pramipexole (MIRAPEX) 0 25 mg tablet, Take 0 25 mg by mouth daily, Disp: , Rfl:     Vitamin D, Ergocalciferol, 2000 units CAPS, Take 2,000 Units by mouth daily , Disp: , Rfl:     vitamin E, tocopherol, 1,000 units capsule, Take 2,000 Units by mouth daily , Disp: , Rfl:     Lab, Imaging and other studies: I have personally reviewed pertinent labs  Laboratory Results:  Results for orders placed or performed in visit on 03/06/20   Hemoglobin A1C   Result Value Ref Range    Hemoglobin A1C 7 1    Microalbumin / creatinine urine ratio   Result Value Ref Range    EXT Creatinine Urine 53 2     Microalbum  ,U,Random 13 2     EXTERNAL Microalb/Creat Ratio 248 1              Invalid input(s): ALBUMIN      Radiology review:   chest X-ray    Ultrasound      Portions of the record may have been created with voice recognition software  Occasional wrong word or "sound a like" substitutions may have occurred due to the inherent limitations of voice recognition software  Read the chart carefully and recognize, using context, where substitutions have occurred

## 2020-03-09 ENCOUNTER — OFFICE VISIT (OUTPATIENT)
Dept: NEPHROLOGY | Facility: CLINIC | Age: 70
End: 2020-03-09
Payer: MEDICARE

## 2020-03-09 VITALS — WEIGHT: 177.2 LBS | BODY MASS INDEX: 30.42 KG/M2

## 2020-03-09 DIAGNOSIS — E11.21 DIABETIC NEPHROPATHY ASSOCIATED WITH TYPE 2 DIABETES MELLITUS (HCC): ICD-10-CM

## 2020-03-09 DIAGNOSIS — R80.1 PERSISTENT PROTEINURIA: ICD-10-CM

## 2020-03-09 DIAGNOSIS — I12.9 HYPERTENSIVE CHRONIC KIDNEY DISEASE WITH STAGE 1 THROUGH STAGE 4 CHRONIC KIDNEY DISEASE, OR UNSPECIFIED CHRONIC KIDNEY DISEASE: Primary | ICD-10-CM

## 2020-03-09 DIAGNOSIS — N18.30 CHRONIC KIDNEY DISEASE, STAGE III (MODERATE) (HCC): ICD-10-CM

## 2020-03-09 PROCEDURE — 99214 OFFICE O/P EST MOD 30 MIN: CPT | Performed by: INTERNAL MEDICINE

## 2020-03-09 RX ORDER — TORSEMIDE 5 MG/1
10 TABLET ORAL DAILY
Qty: 60 TABLET | Refills: 5 | Status: SHIPPED | OUTPATIENT
Start: 2020-03-09 | End: 2020-05-05 | Stop reason: HOSPADM

## 2020-03-09 NOTE — PATIENT INSTRUCTIONS
1  In general labs look quite good  2  Medication changes today:  · Increase torsemide to 2-5 mg tablets equivalent to 10 mg daily in the morning  3  Please wait 1-2 weeks after making the above medicatio change and go for nonfasting lab work    4  Please wait 3-4 weeks after making the above medication change and take 1 week a blood pressure readings morning evening, sitting and standing as follows:  · Take the morning readings before any medications  · Take the evening readings before supper and before taking any medications at that time  · When taking standing readings, keep your arm supported at heart level and not dangling  · Make sure you are sitting with your back supported in feet on the ground on crossed  · Make sure you have not taken any coffee or caffeine products or exercised or smoke cigarettes at least 30 minutes before taking your blood pressure  Then please send those blood pressure readings into the office    5  For your leg cramps at night:  · Continue taking vitamin-B complex he may need to take it 2 to 3 times a day  · Try foot/leg stretching before bedtime    · Hydrate well    6  Measures to help with your leg swelling:   Please avoid salt as much as possible   Keep your legs elevated whenever seated   Use support hose knee-high that you can purchase at a  medical supply company or drug store when you are on her feet for a significant portion of the day   Adjust your water pill diuretics as follows: Torsemide 10 mg daily as outlined above   PLEASE CALL IF YOUR SWELLING DOES NOT IMPROVE OR CERTAINLY WORSENS; OR YOU DEVELOPED A SIGNIFICANT WEIGHT GAIN OF GREATER THAN 3-4 LB IN A SHORT PERIOD OF TIME FOR EXAMPLE 1-2 DAYS THAT DOES NOT IMPROVE     6  Follow-up in 4 months:  · Please bring in 1 week a blood pressure readings morning evening, sitting and standing is outlined above  · Please go for fasting lab work 1-2 weeks prior to your appointment    7  General instructions:   AVOID SALT BUT NOT ADDING AN READING LABELS TO MAKE SURE THERE IS LOW-SALT IN THE FOOD THAT YOU ARE EATING     Avoid nonsteroidal anti-inflammatory drugs such as Naprosyn, ibuprofen, Aleve, Advil, Celebrex, etc   You can use Tylenol as needed if you do not have any liver condition to be concerned about     Avoid medications such as Sudafed or decongestants and antihistamines that contained the D component which is the decongestant  You can take antihistamines without the decongestant or D component   Try to avoid medications such as pantoprazole or  Protonix/Nexium or Esomeprazole)/Prilosec or omeprazole/Prevacid or lansoprazole/AcipHex or Rabeprazole  If you are able to, use Pepcid as this is safer for your kidneys   Try to exercise at least 30 minutes 3 days a week to begin with with an ultimate goal of 5 days a week for at least 30 minutes     Try to lose 5-10 lb by your next visit     Please do not drink more than 2 glasses of alcohol/wine on a daily basis as this may contribute to your high blood pressure

## 2020-03-09 NOTE — LETTER
March 9, 2020     Anne-Marie Ybarra, [address] [place] [address]     Patient: Satish Leggett   YOB: 1950   Date of Visit: 3/9/2020       Dear Dr Shima Stanley:    Thank you for referring Satish Leggett to me for evaluation  Below are my notes for this consultation  If you have questions, please do not hesitate to call me  I look forward to following your patient along with you  Sincerely,        Ivan Cassidy MD        CC: DO Lorna Albarado MD Gael Sermons, MD Eustacio Copier, MD  3/9/2020  3:30 PM  Sign at close encounter  RENAL FOLLOW UP NOTE: td    ASSESSMENT AND PLAN:  1   CKD stage 3 :  · Etiology:  Diabetic nephropathy/hypertensive nephrosclerosis/arteriolar nephrosclerosis/chronic NSAID use   No evidence of obstructive uropathy, renal artery disease or primary glomerular disease with a bland urinalysis  Of note, CPK was normal at 105 no evidence rhabdomyolysis  · Baseline creatinine:  1 5-2 0  · Current creatinine:  1 86 at baseline  · Urine protein creatinine ratio:  0 38 g at goal  Recommendations:  · Treat hypertension-please see below  · Treat dyslipidemia-please see below  · Maintain proteinuria less than 1 g or as low as possible  · Avoid nephrotoxic agents such as NSAIDs, patient counseled as such  2   Volume:  Euvolemic  3   Hypertension:  Workup:  · saline suppression test for primary aldosterone state was normal  · plasma free metanephrines was negative  · renal artery duplex was negative 02/2019       Current blood pressure averages:   A m :  145/90 without orthostatic changes  P m :  143/86, without orthostatic changes  Heart rate:  · Goal blood pressure:  less than 130/80  Recommendations:  · Push nonmedical regimen including weight loss, isotonic exercise and avoidance of salt; patient counseled as such  · Medication changes today:   increase torsemide to 10 mg daily recheck labs 1-2 weeks and blood pressure recheck 3-4 weeks    4   Electrolytes:  all acceptable including a potassium of 4 6  5   Mineral bone disorder:  · Calcium/magnesium/phosphorus:  All acceptable  · PTH intact:  42 4 acceptable  · Vitamin-D:  35:  From last visit recheck next visit  6   Dyslipidemia:  · Goal LDL:  Less than 100  · Current lipid profile:  LDL 84/HDL 44/triglycerides 214; from last visit recheck next visit  Recommendations:  Per Endocrinology  7   Anemia:  Hemoglobin:  Normal at 12 5  8   Other problems:  · Depression  · Diabetes mellitus per primary medical physician  · Hypothyroidism  · BARBARA on CPAP  · Fibromyalgia/osteoarthritis  · Nephrolithiasis  · Basal cell/squamous CA of the skin  · Urinary stress incontinence  · Status post incisional hernia repairs       PATIENT INSTRUCTIONS:    Patient Instructions   1  In general labs look quite good  2  Medication changes today:  · Increase torsemide to 2-5 mg tablets equivalent to 10 mg daily in the morning  3  Please wait 1-2 weeks after making the above medicatio change and go for nonfasting lab work    4  Please wait 3-4 weeks after making the above medication change and take 1 week a blood pressure readings morning evening, sitting and standing as follows:  · Take the morning readings before any medications  · Take the evening readings before supper and before taking any medications at that time  · When taking standing readings, keep your arm supported at heart level and not dangling  · Make sure you are sitting with your back supported in feet on the ground on crossed  · Make sure you have not taken any coffee or caffeine products or exercised or smoke cigarettes at least 30 minutes before taking your blood pressure  Then please send those blood pressure readings into the office    5  For your leg cramps at night:  · Continue taking vitamin-B complex he may need to take it 2 to 3 times a day  · Try foot/leg stretching before bedtime    · Hydrate well    6  Measures to help with your leg swelling:   Please avoid salt as much as possible   Keep your legs elevated whenever seated   Use support hose knee-high that you can purchase at a  medical supply company or drug store when you are on her feet for a significant portion of the day   Adjust your water pill diuretics as follows: Torsemide 10 mg daily as outlined above   PLEASE CALL IF YOUR SWELLING DOES NOT IMPROVE OR CERTAINLY WORSENS; OR YOU DEVELOPED A SIGNIFICANT WEIGHT GAIN OF GREATER THAN 3-4 LB IN A SHORT PERIOD OF TIME FOR EXAMPLE 1-2 DAYS THAT DOES NOT IMPROVE     6  Follow-up in 4 months:  · Please bring in 1 week a blood pressure readings morning evening, sitting and standing is outlined above  · Please go for fasting lab work 1-2 weeks prior to your appointment    7  General instructions:   AVOID SALT BUT NOT ADDING AN READING LABELS TO MAKE SURE THERE IS LOW-SALT IN THE FOOD THAT YOU ARE EATING     Avoid nonsteroidal anti-inflammatory drugs such as Naprosyn, ibuprofen, Aleve, Advil, Celebrex, etc   You can use Tylenol as needed if you do not have any liver condition to be concerned about     Avoid medications such as Sudafed or decongestants and antihistamines that contained the D component which is the decongestant  You can take antihistamines without the decongestant or D component   Try to avoid medications such as pantoprazole or  Protonix/Nexium or Esomeprazole)/Prilosec or omeprazole/Prevacid or lansoprazole/AcipHex or Rabeprazole  If you are able to, use Pepcid as this is safer for your kidneys   Try to exercise at least 30 minutes 3 days a week to begin with with an ultimate goal of 5 days a week for at least 30 minutes     Try to lose 5-10 lb by your next visit     Please do not drink more than 2 glasses of alcohol/wine on a daily basis as this may contribute to your high blood pressure  Subjective:   Patient is tired  Only other complaint is chronic ongoing cramps during the night    Her appetite is good  No fevers chills cough or colds  No nausea vomiting or diarrhea  No urinary symptoms including dysuria hematuria voiding symptoms foamy urine  No chest pain, shortness of breath but there is some leg edema mild especially if she has any sort of salty food and toward the evening if she is not elevated her legs  No headaches, dizziness or lightheadedness  Blood pressure medications:    ROS:  See HPI, otherwise review of systems as completely reviewed with the patient are negative    Past Medical History:   Diagnosis Date    Anxiety     Arthritis     Diabetes mellitus ([facility] [address] )     Family history of thyroid problem     Heart burn     Hyperplasia, parathyroid ([facility] [address] [facility]     Hypertension     Kidney problem     Seasonal allergies     Sleep apnea     Swollen ankles      Past Surgical History:   Procedure Laterality Date    ARTHROSCOPY KNEE      BREAST BIOPSY      CHOLECYSTECTOMY      HIATAL HERNIA REPAIR      LIPOSUCTION      REDUCTION MAMMAPLASTY      SQUAMOUS CELL CARCINOMA EXCISION      TOE SURGERY      TONSILLECTOMY      TUBAL LIGATION       Family History   Problem Relation Age of Onset    Heart disease Mother     Heart disease Father     Cancer Father         lung cancer      reports that she quit smoking about 44 years ago  Her smoking use included cigarettes  She has a 20 00 pack-year smoking history  She has never used smokeless tobacco  She reports that she drinks alcohol  She reports that she does not use drugs  I COMPLETELY REVIEWED THE PAST MEDICAL HISTORY/PAST SURGICAL HISTORY/SOCIAL HISTORY/FAMILY HISTORY/AND MEDICATIONS  AND UPDATED ALL    Objective:     Vitals: There were no vitals filed for this visit  Weight (last 2 days)     Date/Time   Weight    03/09/20 1522   80 4 (177 2)            Wt Readings from Last 3 Encounters:   03/09/20 80 4 kg (177 lb 3 2 oz)   02/20/20 83 5 kg (184 lb)   12/06/19 76 7 kg (169 lb)       Body mass index is 30 42 kg/m²      Physical Exam: General:  No acute distress  Skin:  No acute rash  Eyes:  No scleral icterus, noninjected, no discharge from eyes  ENT:  Moist mucous membranes  Neck:  Supple, no jugular venous distention, trachea is midline, no lymphadenopathy and no thyromegaly  Back   No CVAT  Chest:  Clear to auscultation and percussion, good respiratory effort  CVS:  Regular rate and rhythm without a rub, or gallops or murmurs  Abdomen:  Soft and nontender with normal bowel sounds  Extremities:  No cyanosis and no edema, no arthritic changes, normal range of motion  Neuro:  Grossly intact  Psych:  Alert, oriented x3 and appropriate      Medications:    Current Outpatient Medications:     amLODIPine (NORVASC) 5 mg tablet, Take 1 tablet (5 mg total) by mouth 2 (two) times a day (Patient taking differently: Take 5 mg by mouth daily ), Disp: 180 tablet, Rfl: 3    b complex vitamins tablet, Take 1 tablet by mouth daily , Disp: , Rfl:     B-D ULTRAFINE III SHORT PEN 31G X 8 MM MISC, USE AS DIRECTED 4 TIMES PER DAY, Disp: , Rfl: 3    losartan (COZAAR) 50 mg tablet, Take 50 mg by mouth 2 (two) times a day , Disp: , Rfl:     torsemide (DEMADEX) 5 MG tablet, Take 2 tablets (10 mg total) by mouth daily, Disp: 60 tablet, Rfl: 5    albuterol (VENTOLIN HFA) 90 mcg/act inhaler, , Disp: , Rfl:     Calcium Carbonate (CALCIUM 600 PO), Take 1 capsule by mouth 2 (two) times a day, Disp: , Rfl:     cyclobenzaprine (FLEXERIL) 10 mg tablet, Take 10 mg by mouth daily, Disp: , Rfl:     Dulaglutide (TRULICITY) 2 02 MW/4 2CT SOPN, Inject 0 75 mg under the skin once a week, Disp: , Rfl:     DULoxetine (CYMBALTA) 60 mg delayed release capsule, Take 60 mg by mouth daily , Disp: , Rfl:     gabapentin (NEURONTIN) 300 mg capsule, Take 300 mg by mouth Take 300 mg with dinner and 600mg at bedtime , Disp: , Rfl:     glimepiride (AMARYL) 2 mg tablet, Take 4 mg by mouth 2 (two) times a day , Disp: , Rfl:     insulin detemir (LEVEMIR) 100 units/mL subcutaneous injection, Inject 30 Units under the skin every morning , Disp: , Rfl:     levothyroxine 175 mcg tablet, Take 175 mcg by mouth daily, Disp: , Rfl:     Magnesium 500 MG CAPS, Take 1 capsule by mouth 3 (three) times a day, Disp: , Rfl:     methocarbamol (ROBAXIN) 500 mg tablet, Take 500 mg by mouth daily at bedtime as needed, Disp: , Rfl: 2    metoprolol tartrate (LOPRESSOR) 50 mg tablet, Take 50 mg by mouth every 12 (twelve) hours  , Disp: , Rfl:     Multiple Vitamin (MULTIVITAMIN) capsule, Take 1 capsule by mouth daily, Disp: , Rfl:     NOVOLOG FLEXPEN 100 units/mL injection pen, PLEASE SEE ATTACHED FOR DETAILED DIRECTIONS, Disp: , Rfl: 0    Omega-3 Fatty Acids (FISH OIL) 1,000 mg, Take 1,000 mg by mouth daily, Disp: , Rfl:     omega-3-acid ethyl esters (LOVAZA) 1 g capsule, , Disp: , Rfl:     omeprazole (PriLOSEC) 40 MG capsule, Take 40 mg by mouth daily , Disp: , Rfl:     oxybutynin (DITROPAN) 5 mg tablet, Take 5 mg by mouth daily, Disp: , Rfl:     pioglitazone (ACTOS) 30 mg tablet, Take 30 mg by mouth daily, Disp: , Rfl:     pramipexole (MIRAPEX) 0 25 mg tablet, Take 0 25 mg by mouth daily, Disp: , Rfl:     Vitamin D, Ergocalciferol, 2000 units CAPS, Take 2,000 Units by mouth daily , Disp: , Rfl:     vitamin E, tocopherol, 1,000 units capsule, Take 2,000 Units by mouth daily , Disp: , Rfl:     Lab, Imaging and other studies: I have personally reviewed pertinent labs  Laboratory Results:  Results for orders placed or performed in visit on 03/06/20   Hemoglobin A1C   Result Value Ref Range    Hemoglobin A1C 7 1    Microalbumin / creatinine urine ratio   Result Value Ref Range    EXT Creatinine Urine 53 2     Microalbum  ,U,Random 13 2     EXTERNAL Microalb/Creat Ratio 248 1              Invalid input(s): ALBUMIN      Radiology review:   chest X-ray    Ultrasound      Portions of the record may have been created with voice recognition software    Occasional wrong word or "sound a like" substitutions may have occurred due to the inherent limitations of voice recognition software  Read the chart carefully and recognize, using context, where substitutions have occurred

## 2020-03-09 NOTE — LETTER
March 9, 2020     Kath Schofield, [address] [place] [address]     Patient: Rhea King   YOB: 1950   Date of Visit: 3/9/2020       Dear Dr Tiffany Neff:    Thank you for referring Rhea King to me for evaluation  Below are my notes for this consultation  If you have questions, please do not hesitate to call me  I look forward to following your patient along with you  Sincerely,        Celine Ross MD        CC: DO Yeny Vargas MD Wetzel Rude, MD Baird Lions, MD  3/9/2020  3:29 PM  Incomplete  RENAL FOLLOW UP NOTE: td    ASSESSMENT AND PLAN:  1  Florence Mcarthur SDNJI 1 :  · Etiology:  Diabetic nephropathy/hypertensive nephrosclerosis/arteriolar nephrosclerosis/chronic NSAID use   No evidence of obstructive uropathy, renal artery disease or primary glomerular disease with a bland urinalysis  Of note, CPK was normal at 105 no evidence rhabdomyolysis  · Baseline creatinine:  1 5-2 0  · Current creatinine:  1 86 at baseline  · Urine protein creatinine ratio:  0 38 g at goal  Recommendations:  · Treat hypertension-please see below  · Treat dyslipidemia-please see below  · Maintain proteinuria less than 1 g or as low as possible  · Avoid nephrotoxic agents such as NSAIDs, patient counseled as such  2   Volume:  Euvolemic  3   Hypertension:  Workup:  · saline suppression test for primary aldosterone state was normal  · plasma free metanephrines was negative  · renal artery duplex was negative 02/2019       Current blood pressure averages:   A m :  145/90 without orthostatic changes  P m :  143/86, without orthostatic changes  Heart rate:  · Goal blood pressure:  less than 130/80  Recommendations:  · Push nonmedical regimen including weight loss, isotonic exercise and avoidance of salt; patient counseled as such  · Medication changes today:   increase torsemide to 10 mg daily recheck labs 1-2 weeks and blood pressure recheck 3-4 weeks    4   Electrolytes:  all acceptable including a potassium of 4 6  5   Mineral bone disorder:  · Calcium/magnesium/phosphorus:  All acceptable  · PTH intact:  42 4 acceptable  · Vitamin-D:  35:  From last visit recheck next visit  6   Dyslipidemia:  · Goal LDL:  Less than 100  · Current lipid profile:  LDL 84/HDL 44/triglycerides 214; from last visit recheck next visit  Recommendations:  Per Endocrinology  7   Anemia:  Hemoglobin:  Normal at 12 5  8   Other problems:  · Depression  · Diabetes mellitus per primary medical physician  · Hypothyroidism  · BARBARA on CPAP  · Fibromyalgia/osteoarthritis  · Nephrolithiasis  · Basal cell/squamous CA of the skin  · Urinary stress incontinence  · Status post incisional hernia repairs       PATIENT INSTRUCTIONS:    Patient Instructions   1  In general labs look quite good  2  Medication changes today:  · Increase torsemide to 2-5 mg tablets equivalent to 10 mg daily in the morning  3  Please wait 1-2 weeks after making the above medicatio change and go for nonfasting lab work    4  Please wait 3-4 weeks after making the above medication change and take 1 week a blood pressure readings morning evening, sitting and standing as follows:  · Take the morning readings before any medications  · Take the evening readings before supper and before taking any medications at that time  · When taking standing readings, keep your arm supported at heart level and not dangling  · Make sure you are sitting with your back supported in feet on the ground on crossed  · Make sure you have not taken any coffee or caffeine products or exercised or smoke cigarettes at least 30 minutes before taking your blood pressure  Then please send those blood pressure readings into the office    5  For your leg cramps at night:  · Continue taking vitamin-B complex he may need to take it 2 to 3 times a day  · Try foot/leg stretching before bedtime    · Hydrate well    6  Measures to help with your leg swelling:   Please avoid salt as much as possible   Keep your legs elevated whenever seated   Use support hose knee-high that you can purchase at a  medical supply company or drug store when you are on her feet for a significant portion of the day   Adjust your water pill diuretics as follows: Torsemide 10 mg daily as outlined above   PLEASE CALL IF YOUR SWELLING DOES NOT IMPROVE OR CERTAINLY WORSENS; OR YOU DEVELOPED A SIGNIFICANT WEIGHT GAIN OF GREATER THAN 3-4 LB IN A SHORT PERIOD OF TIME FOR EXAMPLE 1-2 DAYS THAT DOES NOT IMPROVE     6  Follow-up in 4 months:  · Please bring in 1 week a blood pressure readings morning evening, sitting and standing is outlined above  · Please go for fasting lab work 1-2 weeks prior to your appointment    7  General instructions:   AVOID SALT BUT NOT ADDING AN READING LABELS TO MAKE SURE THERE IS LOW-SALT IN THE FOOD THAT YOU ARE EATING     Avoid nonsteroidal anti-inflammatory drugs such as Naprosyn, ibuprofen, Aleve, Advil, Celebrex, etc   You can use Tylenol as needed if you do not have any liver condition to be concerned about     Avoid medications such as Sudafed or decongestants and antihistamines that contained the D component which is the decongestant  You can take antihistamines without the decongestant or D component   Try to avoid medications such as pantoprazole or  Protonix/Nexium or Esomeprazole)/Prilosec or omeprazole/Prevacid or lansoprazole/AcipHex or Rabeprazole  If you are able to, use Pepcid as this is safer for your kidneys   Try to exercise at least 30 minutes 3 days a week to begin with with an ultimate goal of 5 days a week for at least 30 minutes     Try to lose 5-10 lb by your next visit     Please do not drink more than 2 glasses of alcohol/wine on a daily basis as this may contribute to your high blood pressure  Subjective:   Patient is tired  Only other complaint is chronic ongoing cramps during the night    Her appetite is good  No fevers chills cough or colds  No nausea vomiting or diarrhea  No urinary symptoms including dysuria hematuria voiding symptoms foamy urine  No chest pain, shortness of breath but there is some leg edema mild especially if she has any sort of salty food and toward the evening if she is not elevated her legs  No headaches, dizziness or lightheadedness  Blood pressure medications:    ROS:  See HPI, otherwise review of systems as completely reviewed with the patient are negative    Past Medical History:   Diagnosis Date    Anxiety     Arthritis     Diabetes mellitus [facility] [address] [facility]     Family history of thyroid problem     Heart burn     Hyperplasia, parathyroid ([facility] [address] [facility]     Hypertension     Kidney problem     Seasonal allergies     Sleep apnea     Swollen ankles      Past Surgical History:   Procedure Laterality Date    ARTHROSCOPY KNEE      BREAST BIOPSY      CHOLECYSTECTOMY      HIATAL HERNIA REPAIR      LIPOSUCTION      REDUCTION MAMMAPLASTY      SQUAMOUS CELL CARCINOMA EXCISION      TOE SURGERY      TONSILLECTOMY      TUBAL LIGATION       Family History   Problem Relation Age of Onset    Heart disease Mother     Heart disease Father     Cancer Father         lung cancer      reports that she quit smoking about 44 years ago  Her smoking use included cigarettes  She has a 20 00 pack-year smoking history  She has never used smokeless tobacco  She reports that she drinks alcohol  She reports that she does not use drugs  I COMPLETELY REVIEWED THE PAST MEDICAL HISTORY/PAST SURGICAL HISTORY/SOCIAL HISTORY/FAMILY HISTORY/AND MEDICATIONS  AND UPDATED ALL    Objective:     Vitals: There were no vitals filed for this visit  Weight (last 2 days)     Date/Time   Weight    03/09/20 1522   80 4 (177 2)            Wt Readings from Last 3 Encounters:   03/09/20 80 4 kg (177 lb 3 2 oz)   02/20/20 83 5 kg (184 lb)   12/06/19 76 7 kg (169 lb)       Body mass index is 30 42 kg/m²      Physical Exam: General:  No acute distress  Skin:  No acute rash  Eyes:  No scleral icterus, noninjected, no discharge from eyes  ENT:  Moist mucous membranes  Neck:  Supple, no jugular venous distention, trachea is midline, no lymphadenopathy and no thyromegaly  Back   No CVAT  Chest:  Clear to auscultation and percussion, good respiratory effort  CVS:  Regular rate and rhythm without a rub, or gallops or murmurs  Abdomen:  Soft and nontender with normal bowel sounds  Extremities:  No cyanosis and no edema, no arthritic changes, normal range of motion  Neuro:  Grossly intact  Psych:  Alert, oriented x3 and appropriate      Medications:    Current Outpatient Medications:     amLODIPine (NORVASC) 5 mg tablet, Take 1 tablet (5 mg total) by mouth 2 (two) times a day (Patient taking differently: Take 5 mg by mouth daily ), Disp: 180 tablet, Rfl: 3    b complex vitamins tablet, Take 1 tablet by mouth daily , Disp: , Rfl:     B-D ULTRAFINE III SHORT PEN 31G X 8 MM MISC, USE AS DIRECTED 4 TIMES PER DAY, Disp: , Rfl: 3    losartan (COZAAR) 50 mg tablet, Take 50 mg by mouth 2 (two) times a day , Disp: , Rfl:     torsemide (DEMADEX) 5 MG tablet, Take 2 tablets (10 mg total) by mouth daily, Disp: 60 tablet, Rfl: 5    albuterol (VENTOLIN HFA) 90 mcg/act inhaler, , Disp: , Rfl:     Calcium Carbonate (CALCIUM 600 PO), Take 1 capsule by mouth 2 (two) times a day, Disp: , Rfl:     cyclobenzaprine (FLEXERIL) 10 mg tablet, Take 10 mg by mouth daily, Disp: , Rfl:     Dulaglutide (TRULICITY) 9 49 MONSALVE/8 0HX SOPN, Inject 0 75 mg under the skin once a week, Disp: , Rfl:     DULoxetine (CYMBALTA) 60 mg delayed release capsule, Take 60 mg by mouth daily , Disp: , Rfl:     gabapentin (NEURONTIN) 300 mg capsule, Take 300 mg by mouth Take 300 mg with dinner and 600mg at bedtime , Disp: , Rfl:     glimepiride (AMARYL) 2 mg tablet, Take 4 mg by mouth 2 (two) times a day , Disp: , Rfl:     insulin detemir (LEVEMIR) 100 units/mL subcutaneous injection, Inject 30 Units under the skin every morning , Disp: , Rfl:     levothyroxine 175 mcg tablet, Take 175 mcg by mouth daily, Disp: , Rfl:     Magnesium 500 MG CAPS, Take 1 capsule by mouth 3 (three) times a day, Disp: , Rfl:     methocarbamol (ROBAXIN) 500 mg tablet, Take 500 mg by mouth daily at bedtime as needed, Disp: , Rfl: 2    metoprolol tartrate (LOPRESSOR) 50 mg tablet, Take 50 mg by mouth every 12 (twelve) hours  , Disp: , Rfl:     Multiple Vitamin (MULTIVITAMIN) capsule, Take 1 capsule by mouth daily, Disp: , Rfl:     NOVOLOG FLEXPEN 100 units/mL injection pen, PLEASE SEE ATTACHED FOR DETAILED DIRECTIONS, Disp: , Rfl: 0    Omega-3 Fatty Acids (FISH OIL) 1,000 mg, Take 1,000 mg by mouth daily, Disp: , Rfl:     omega-3-acid ethyl esters (LOVAZA) 1 g capsule, , Disp: , Rfl:     omeprazole (PriLOSEC) 40 MG capsule, Take 40 mg by mouth daily , Disp: , Rfl:     oxybutynin (DITROPAN) 5 mg tablet, Take 5 mg by mouth daily, Disp: , Rfl:     pioglitazone (ACTOS) 30 mg tablet, Take 30 mg by mouth daily, Disp: , Rfl:     pramipexole (MIRAPEX) 0 25 mg tablet, Take 0 25 mg by mouth daily, Disp: , Rfl:     Vitamin D, Ergocalciferol, 2000 units CAPS, Take 2,000 Units by mouth daily , Disp: , Rfl:     vitamin E, tocopherol, 1,000 units capsule, Take 2,000 Units by mouth daily , Disp: , Rfl:     Lab, Imaging and other studies: I have personally reviewed pertinent labs  Laboratory Results:  Results for orders placed or performed in visit on 03/06/20   Hemoglobin A1C   Result Value Ref Range    Hemoglobin A1C 7 1    Microalbumin / creatinine urine ratio   Result Value Ref Range    EXT Creatinine Urine 53 2     Microalbum  ,U,Random 13 2     EXTERNAL Microalb/Creat Ratio 248 1              Invalid input(s): ALBUMIN      Radiology review:   chest X-ray    Ultrasound      Portions of the record may have been created with voice recognition software    Occasional wrong word or "sound a like" substitutions may have occurred due to the inherent limitations of voice recognition software  Read the chart carefully and recognize, using context, where substitutions have occurred  Tejinder Soto MD  3/6/2020 10:57 AM  Sign at close encounter  RENAL FOLLOW UP NOTE: td    ASSESSMENT AND PLAN:  1   CKD stage 3 :  · Etiology:  Diabetic nephropathy/hypertensive nephrosclerosis/arteriolar nephrosclerosis/chronic NSAID use   No evidence of obstructive uropathy, renal artery disease or primary glomerular disease with a bland urinalysis  Of note, CPK was normal at 105 no evidence rhabdomyolysis  · Baseline creatinine:  1 5-2 0  · Current creatinine: ***  · Urine protein creatinine ratio:***  Recommendations:  · Treat hypertension-please see below  · Treat dyslipidemia-please see below  · Maintain proteinuria less than 1 g or as low as possible  · Avoid nephrotoxic agents such as NSAIDs, patient counseled as such  2   Volume:  Euvolemic  3   Hypertension:  Workup:  · saline suppression test for primary aldosterone state was normal  · plasma free metanephrines was negative  · renal artery duplex was negative 02/2019       Current blood pressure averages:   A m :  P m :  Heart rate:  · Goal blood pressure:  less than 130/80  Recommendations:  · Push nonmedical regimen including weight loss, isotonic exercise and avoidance of salt; patient counseled as such  · Medication changes today:   4   Electrolytes:  all acceptable including a potassium of 4 2 which overall is improved  5   Mineral bone disorder:  · Calcium/magnesium/phosphorus:***  · PTH intact:  less than 6 3 from prior value    · Vitamin-D:  35:***  6   Dyslipidemia:  · Goal LDL:  Less than 100  · Current lipid profile:  LDL 84/HDL 44/triglycerides 214  Recommendations:  Per Endocrinology  7   Anemia:  Hemoglobin:***  8   Other problems:  · Depression  · Diabetes mellitus per primary medical physician  · Hypothyroidism  · BARBARA on CPAP  · Fibromyalgia/osteoarthritis  · Nephrolithiasis  · Basal cell/squamous CA of the skin  · Urinary stress incontinence  · Status post incisional hernia repairs       PATIENT INSTRUCTIONS:    There are no Patient Instructions on file for this visit  Subjective:   ***    ROS:  See HPI, otherwise review of systems as completely reviewed with the patient are negative    Past Medical History:   Diagnosis Date    Anxiety     Arthritis     [facility] mellitus [facility] [address] [facility]     Family history of thyroid problem     Heart burn     Hyperplasia, parathyroid ([facility] [address] [facility]     Hypertension     Kidney problem     Seasonal allergies     Sleep apnea     Swollen ankles      Past Surgical History:   Procedure Laterality Date    ARTHROSCOPY KNEE      BREAST BIOPSY      CHOLECYSTECTOMY      HIATAL HERNIA REPAIR      LIPOSUCTION      REDUCTION MAMMAPLASTY      SQUAMOUS CELL CARCINOMA EXCISION      TOE SURGERY      TONSILLECTOMY      TUBAL LIGATION       Family History   Problem Relation Age of Onset    Heart disease Mother     Heart disease Father     Cancer Father         lung cancer      reports that she quit smoking about 44 years ago  Her smoking use included cigarettes  She has a 20 00 pack-year smoking history  She has never used smokeless tobacco  She reports that she drinks alcohol  She reports that she does not use drugs  I COMPLETELY REVIEWED THE PAST MEDICAL HISTORY/PAST SURGICAL HISTORY/SOCIAL HISTORY/FAMILY HISTORY/AND MEDICATIONS  AND UPDATED ALL    Objective:     Vitals: There were no vitals filed for this visit  Weight (last 2 days)     None        Wt Readings from Last 3 Encounters:   02/20/20 83 5 kg (184 lb)   12/06/19 76 7 kg (169 lb)   11/30/19 80 5 kg (177 lb 7 5 oz)       There is no height or weight on file to calculate BMI      Physical Exam: General:  No acute distress  Skin:  No acute rash  Eyes:  No scleral icterus, noninjected, no discharge from eyes  ENT:  Moist mucous membranes  Neck:  Supple, no jugular venous distention, trachea is midline, no lymphadenopathy and no thyromegaly  Back   No CVAT  Chest:  Clear to auscultation and percussion, good respiratory effort  CVS:  Regular rate and rhythm without a rub, or gallops or murmurs  Abdomen:  Soft and nontender with normal bowel sounds  Extremities:  No cyanosis and no edema, no arthritic changes, normal range of motion  Neuro:  Grossly intact  Psych:  Alert, oriented x3 and appropriate      Medications:    Current Outpatient Medications:     albuterol (VENTOLIN HFA) 90 mcg/act inhaler, , Disp: , Rfl:     amLODIPine (NORVASC) 5 mg tablet, Take 1 tablet (5 mg total) by mouth 2 (two) times a day, Disp: 180 tablet, Rfl: 3    b complex vitamins tablet, Take 1 tablet by mouth daily , Disp: , Rfl:     B-D ULTRAFINE III SHORT PEN 31G X 8 MM MISC, USE AS DIRECTED 4 TIMES PER DAY, Disp: , Rfl: 3    Calcium Carbonate (CALCIUM 600 PO), Take 1 capsule by mouth 2 (two) times a day, Disp: , Rfl:     cyclobenzaprine (FLEXERIL) 10 mg tablet, Take 10 mg by mouth daily, Disp: , Rfl:     Dulaglutide (TRULICITY) 6 93 PE/8 4PK SOPN, Inject 0 75 mg under the skin once a week, Disp: , Rfl:     DULoxetine (CYMBALTA) 60 mg delayed release capsule, Take 60 mg by mouth daily , Disp: , Rfl:     ergocalciferol (VITAMIN D2) 50,000 units, Take 1 capsule (50,000 Units total) by mouth once a week for 12 doses, Disp: 12 capsule, Rfl: 0    gabapentin (NEURONTIN) 300 mg capsule, Take 300 mg by mouth Take 300 mg with dinner and 600mg at bedtime , Disp: , Rfl:     glimepiride (AMARYL) 2 mg tablet, Take 4 mg by mouth 2 (two) times a day , Disp: , Rfl:     insulin detemir (LEVEMIR) 100 units/mL subcutaneous injection, Inject 30 Units under the skin every morning , Disp: , Rfl:     levothyroxine 175 mcg tablet, Take 175 mcg by mouth daily, Disp: , Rfl:     losartan (COZAAR) 50 mg tablet, Take 50 mg by mouth daily , Disp: , Rfl:     Magnesium 500 MG CAPS, Take 1 capsule by mouth 3 (three) times a day, Disp: , Rfl:     methocarbamol (ROBAXIN) 500 mg tablet, Take 500 mg by mouth daily at bedtime as needed, Disp: , Rfl: 2    metoprolol tartrate (LOPRESSOR) 50 mg tablet, Take 50 mg by mouth every 12 (twelve) hours  , Disp: , Rfl:     Multiple Vitamin (MULTIVITAMIN) capsule, Take 1 capsule by mouth daily, Disp: , Rfl:     NOVOLOG FLEXPEN 100 units/mL injection pen, PLEASE SEE ATTACHED FOR DETAILED DIRECTIONS, Disp: , Rfl: 0    Omega-3 Fatty Acids (FISH OIL) 1,000 mg, Take 1,000 mg by mouth daily, Disp: , Rfl:     omega-3-acid ethyl esters (LOVAZA) 1 g capsule, , Disp: , Rfl:     omeprazole (PriLOSEC) 40 MG capsule, Take 40 mg by mouth daily , Disp: , Rfl:     oxybutynin (DITROPAN) 5 mg tablet, Take 5 mg by mouth daily, Disp: , Rfl:     pioglitazone (ACTOS) 30 mg tablet, Take 30 mg by mouth daily, Disp: , Rfl:     pramipexole (MIRAPEX) 0 25 mg tablet, Take 0 25 mg by mouth daily, Disp: , Rfl:     torsemide (DEMADEX) 5 MG tablet, Take 1 tablet (5 mg total) by mouth daily, Disp: 30 tablet, Rfl: 5    Vitamin D, Ergocalciferol, 2000 units CAPS, Take 2,000 Units by mouth daily , Disp: , Rfl:     vitamin E, tocopherol, 1,000 units capsule, Take 2,000 Units by mouth daily , Disp: , Rfl:     Lab, Imaging and other studies: I have personally reviewed pertinent labs  Laboratory Results:  Results for orders placed or performed in visit on 31/51/56   Basic metabolic panel   Result Value Ref Range    SODIUM 140     POTASSIUM 4 6     CHLORIDE 104     CO2 28     BUN 48     CREATININE 1 89     Glucose 184     EXTERNAL CALCIUM 9 5     EXTERNAL EGFR 27              Invalid input(s): ALBUMIN      Radiology review:   chest X-ray    Ultrasound      Portions of the record may have been created with voice recognition software  Occasional wrong word or "sound a like" substitutions may have occurred due to the inherent limitations of voice recognition software  Read the chart carefully and recognize, using context, where substitutions have occurred

## 2020-03-10 ENCOUNTER — DOCUMENTATION (OUTPATIENT)
Dept: NEPHROLOGY | Facility: CLINIC | Age: 70
End: 2020-03-10

## 2020-03-10 RX ORDER — ICOSAPENT ETHYL 1000 MG/1
1 CAPSULE ORAL 2 TIMES DAILY
COMMUNITY
End: 2022-04-28

## 2020-03-10 NOTE — PROGRESS NOTES
Due to epic downtime medication list was not updated  Patient and I reviewed list today 3/10/2020  Recommend recheck TSH  Await results

## 2020-03-12 ENCOUNTER — DOCUMENTATION (OUTPATIENT)
Dept: NEPHROLOGY | Facility: CLINIC | Age: 70
End: 2020-03-12

## 2020-03-12 LAB
25(OH)D3 SERPL-MCNC: 35 NG/ML (ref 30–100)
ALBUMIN CREATININE RATIO (HISTORICAL): 0.25
CK SERPL-CCNC: 58 U/L (ref 26–192)
EXT GLUCOSE BLD: 119
EXTERNAL ALBUMIN: 3.6
EXTERNAL ALK PHOS: 39
EXTERNAL ALT: 23
EXTERNAL AST: 15
EXTERNAL BUN: 43
EXTERNAL CALCIUM: 9
EXTERNAL CHLORIDE: 103
EXTERNAL CO2: 29
EXTERNAL CREATININE: 1.86
EXTERNAL EGFR: 27
EXTERNAL POTASSIUM: 4.6
EXTERNAL SODIUM: 139
EXTERNAL T.BILIRUBIN: 0.4
EXTERNAL TOTAL PROTEIN: 6.6
HBA1C MFR BLD: 7.1 % (ref 3.8–5.6)
HCT VFR BLD AUTO: 38.1 % (ref 34.8–46.1)
HGB BLD-MCNC: 12.5 G/DL (ref 11.5–15.4)
MAGNESIUM SERPL-MCNC: 2.1 MG/DL (ref 1.6–2.6)
PHOSPHATE SERPL-MCNC: 3.8 MG/DL (ref 2.3–4.1)
PLATELET # BLD AUTO: 353 THOUSANDS/UL (ref 149–390)
PROT/CREAT UR: 0.38 MG/G{CREAT} (ref 0–0.1)
PTH-INTACT SERPL-MCNC: 42.4 PG/ML
TSH SERPL DL<=0.005 MIU/L-ACNC: 0.89 M[IU]/L
WBC # BLD AUTO: 6.8 THOUSAND/UL

## 2020-03-28 ENCOUNTER — NURSE TRIAGE (OUTPATIENT)
Dept: OTHER | Facility: OTHER | Age: 70
End: 2020-03-28

## 2020-03-28 NOTE — TELEPHONE ENCOUNTER
Regarding: Law  ----- Message from Tricida sent at 3/28/2020  5:15 PM EDT -----  Fever: 99 9  Cough: Yes  SOB: Yes  Chest discomfort  Pt was a respiratory therapist, used pulse ox and it read 88 while walking around and increased while resting    Recent travel outside the country: No  Exposed to anyone positive for coronavirus: No

## 2020-03-29 NOTE — TELEPHONE ENCOUNTER
Reason for Disposition   [1] No COVID-19 EXPOSURE BUT [2] questions about    Additional Information   Negative: Severe difficulty breathing (e g , struggling for each breath, speak in single words, bluish lips)   Negative: Sounds like a life-threatening emergency to the triager   Negative: [1] Difficulty breathing (shortness of breath) occurs AND [2] onset > 14 days after COVID-19 EXPOSURE (Close Contact)   Negative: [1] Dry cough occurs AND [2] onset > 14 days after COVID-19 EXPOSURE   Negative: [1] Wet cough (i e , white-yellow, yellow, green, or ron colored sputum) AND [2] onset > 14 days after COVID-19 EXPOSURE   Negative: [1] Common cold symptoms AND [2] onset > 14 days after COVID-19 EXPOSURE   Negative: [1] Difficulty breathing occurs AND [2] within 14 days of COVID-19 EXPOSURE (Close Contact)   Negative: Patient sounds very sick or weak to the triager   Negative: [1] Fever or feeling feverish AND [2] within 14 Days of COVID-19 EXPOSURE (Close Contact)   Negative: [1] Cough occurs AND [2] within 14 days of COVID-19 EXPOSURE   Negative: [1] Fever (or feeling feverish) OR cough occurs AND [2] travel from or living in high risk area (identified by CDC) AND [3] within last 14 days   Negative: [1] COVID-19 EXPOSURE within last 14 days AND [2] mild body aches, chills, diarrhea, headache, runny nose, or sore throat occur   Negative: [1] COVID-19 EXPOSURE within last 14 days AND [2] NO cough, fever, or breathing difficulty AND [3] 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 recommended personal protective equipment (i e , a respirator-N95 mask, eye protection, gloves, and gown)   Negative: [1] COVID-19 EXPOSURE (Close Contact) within last 14 days AND [2] NO cough, fever, or breathing difficulty   Negative: [1] Travel from or living in high risk area (identified by CDC) AND [2] within last 14 days AND [3] NO cough or fever or breathing difficulty   Negative: [1] COVID-19 EXPOSURE (Close Contact) AND [2] 15 or more days ago AND [3] NO cough or fever or breathing difficulty   Negative: [1] No COVID-19 EXPOSURE BUT [2] living with someone who was exposed and who has no fever or cough   Negative: [1] Caller concerned that exposure to COVID-19 occurred BUT [2] does not meet COVID-19 EXPOSURE criteria from CDC   Negative: COVID-19 testing, questions about who needs it   Negative: [1] Diagnosed with Coronavirus Disease (COVID-19) AND [2] questions about home isolation    Answer Assessment - Initial Assessment Questions  1  CONFIRMED CASE: "Who is the person with the confirmed COVID-19 infection that you were exposed to?"      N/A  2  PLACE of CONTACT: "Where were you when you were exposed to COVID-19  (coronavirus disease 2019)?" (e g , city, state, country)      N/A  3  TYPE of CONTACT: "How much contact was there?" (e g , live in same house, work in same office, same school)      N/A  4  DATE of CONTACT: "When did you have contact with a coronavirus patient?" (e g , days)      N/A  5  DURATION of CONTACT: "How long were you in contact with the COVID-19 (coronavirus disease) patient?" (e g , a few seconds, passed by person, a few minutes, live with the patient)      N/A  6  SYMPTOMS: "Do you have any symptoms?" (e g , fever, cough, breathing difficulty)      Cough, SOB on exertion, low grade fever  7  PREGNANCY OR POSTPARTUM: "Is there any chance you are pregnant?" "When was your last menstrual period?" "Did you deliver in the last 2 weeks?"      N/A  8  HIGH RISK: "Do you have any heart or lung problems?  Do you have a weakened immune system?" (e g , CHF, COPD, asthma, HIV positive, chemotherapy, renal failure, diabetes mellitus, sickle cell anemia)      BARBARA, Brochitis    Protocols used: CORONAVIRUS (COVID-19) EXPOSURE-ADULT-

## 2020-03-29 NOTE — TELEPHONE ENCOUNTER
S/W pt who states that Thursday night, while sleeping, she experienced reflux and woke up coughing  Since then, she has had a cough and occasional wheezing  States she did not have a thermometer until today and her temp was 99 9 which it has stayed since  States she does have some muscle aches  States feels SOB on exertion  Had checked her Pulse ox while up and about and it was 88%, upon rest, it was 92%  Pt able to speak clearly and there was no audible wheezing or SOB heard  Pt uses CPAP and has hx of bronchitis  States she has had to use O2 in the past   States she sees Dr Diane White with [facility], which she did call and was told he would not be available until Monday  Pt lives with son and  and denies any exposure to coronavirus and denies anyone living in the house having any symptoms as well  Advised pt to seek care at [facility] tomorrow if symptoms worsen  Advised to seek care at ED tonight should symptoms worsen

## 2020-03-30 ENCOUNTER — HOSPITAL ENCOUNTER (EMERGENCY)
Facility: HOSPITAL | Age: 70
Discharge: HOME | End: 2020-03-30
Attending: EMERGENCY MEDICINE | Admitting: EMERGENCY MEDICINE
Payer: MEDICARE

## 2020-03-30 ENCOUNTER — APPOINTMENT (EMERGENCY)
Dept: RADIOLOGY | Facility: HOSPITAL | Age: 70
End: 2020-03-30
Payer: MEDICARE

## 2020-03-30 VITALS
BODY MASS INDEX: 30.9 KG/M2 | SYSTOLIC BLOOD PRESSURE: 171 MMHG | TEMPERATURE: 97.9 F | HEART RATE: 62 BPM | OXYGEN SATURATION: 93 % | DIASTOLIC BLOOD PRESSURE: 78 MMHG | RESPIRATION RATE: 16 BRPM | WEIGHT: 180 LBS

## 2020-03-30 DIAGNOSIS — K21.9 GERD (GASTROESOPHAGEAL REFLUX DISEASE): ICD-10-CM

## 2020-03-30 DIAGNOSIS — R68.89 FLU-LIKE SYMPTOMS: ICD-10-CM

## 2020-03-30 DIAGNOSIS — R06.00 DYSPNEA ON EXERTION: Primary | ICD-10-CM

## 2020-03-30 LAB
ATRIAL RATE: 65 BPM
P AXIS: 57 DEGREES
PR INTERVAL: 234 MS
QRS AXIS: 2 DEGREES
QRSD INTERVAL: 94 MS
QT INTERVAL: 392 MS
QTC INTERVAL: 407 MS
T WAVE AXIS: 33 DEGREES
VENTRICULAR RATE: 65 BPM

## 2020-03-30 PROCEDURE — 99285 EMERGENCY DEPT VISIT HI MDM: CPT

## 2020-03-30 PROCEDURE — 93005 ELECTROCARDIOGRAM TRACING: CPT

## 2020-03-30 PROCEDURE — 99284 EMERGENCY DEPT VISIT MOD MDM: CPT | Performed by: EMERGENCY MEDICINE

## 2020-03-30 PROCEDURE — 71045 X-RAY EXAM CHEST 1 VIEW: CPT

## 2020-03-30 PROCEDURE — 93010 ELECTROCARDIOGRAM REPORT: CPT | Performed by: INTERNAL MEDICINE

## 2020-03-30 RX ORDER — LIDOCAINE HYDROCHLORIDE 20 MG/ML
15 SOLUTION OROPHARYNGEAL ONCE
Status: COMPLETED | OUTPATIENT
Start: 2020-03-30 | End: 2020-03-30

## 2020-03-30 RX ORDER — MAGNESIUM HYDROXIDE/ALUMINUM HYDROXICE/SIMETHICONE 120; 1200; 1200 MG/30ML; MG/30ML; MG/30ML
30 SUSPENSION ORAL ONCE
Status: COMPLETED | OUTPATIENT
Start: 2020-03-30 | End: 2020-03-30

## 2020-03-30 RX ADMIN — ALUMINUM HYDROXIDE, MAGNESIUM HYDROXIDE, AND SIMETHICONE 30 ML: 200; 200; 20 SUSPENSION ORAL at 08:22

## 2020-03-30 RX ADMIN — LIDOCAINE HYDROCHLORIDE 15 ML: 20 SOLUTION ORAL; TOPICAL at 08:22

## 2020-03-30 NOTE — ED NOTES
Brought back to room with mask in place, staff masked entire time     Cherie Rodriguez RN  03/30/20 1702

## 2020-03-30 NOTE — ED PROVIDER NOTES
History  Chief Complaint   Patient presents with    Shortness of Breath     short of breath, burning in chest, hemoptysis  Tested for covid yesterday     Patient presents to the emergency department sob cough with blood-tinged mucus  This began this morning  Patient denies fever chills arthralgias or myalgias  Denies runny nose  Throat pain  Denies chest pain jaw arm or neck pain  Patient does complain also about some chest burning consistent with her acid reflux  Denies back belly pain  No foreign travel or exposures to anyone with known corona symptoms  No abdominal pain nausea vomiting or diarrhea  No back pain  No urinary symptoms  No leg or calf pain or swelling  Prior to Admission Medications   Prescriptions Last Dose Informant Patient Reported? Taking?    B-D ULTRAFINE III SHORT PEN 31G X 8 MM MISC  Self Yes No   Sig: USE AS DIRECTED 4 TIMES PER DAY   Calcium Carbonate (CALCIUM 600 PO)  Self Yes No   Sig: Take 1 capsule by mouth 2 (two) times a day   DULoxetine (CYMBALTA) 60 mg delayed release capsule  Self Yes No   Sig: Take 90 mg by mouth daily    Dulaglutide (TRULICITY) 7 95 MN/8 6EL SOPN  Self Yes No   Sig: Inject 0 75 mg under the skin once a week   Icosapent Ethyl (Vascepa) 1 g CAPS   Yes No   Sig: Take 1 g by mouth 2 (two) times a day   Magnesium 500 MG CAPS  Self Yes No   Sig: Take 1 capsule by mouth 3 (three) times a day   Multiple Vitamin (MULTIVITAMIN) capsule  Self Yes No   Sig: Take 1 capsule by mouth daily   NOVOLOG FLEXPEN 100 units/mL injection pen  Self Yes No   Sig: PLEASE SEE ATTACHED FOR DETAILED DIRECTIONS   Omega-3 Fatty Acids (FISH OIL) 1,000 mg  Self Yes No   Sig: Take 1,000 mg by mouth daily   Vitamin D, Ergocalciferol, 2000 units CAPS  Self Yes No   Sig: Take 2,000 Units by mouth daily    albuterol (VENTOLIN HFA) 90 mcg/act inhaler  Self Yes No   amLODIPine (NORVASC) 5 mg tablet  Self No No   Sig: Take 1 tablet (5 mg total) by mouth 2 (two) times a day   b complex vitamins tablet  Self Yes No   Sig: Take 1 tablet by mouth daily    cyclobenzaprine (FLEXERIL) 10 mg tablet  Self Yes No   Sig: Take 10 mg by mouth daily   gabapentin (NEURONTIN) 300 mg capsule  Self Yes No   Sig: Take 300 mg by mouth Take 300 mg with dinner and 600mg at bedtime    glimepiride (AMARYL) 2 mg tablet  Self Yes No   Sig: Take 4 mg by mouth 2 (two) times a day    insulin detemir (LEVEMIR) 100 units/mL subcutaneous injection  Self Yes No   Sig: Inject 30 Units under the skin every morning    levothyroxine 175 mcg tablet  Self Yes No   Sig: Take 175 mcg by mouth daily   losartan (COZAAR) 50 mg tablet  Self Yes No   Sig: Take 50 mg by mouth daily    methocarbamol (ROBAXIN) 500 mg tablet  Self Yes No   Sig: Take 500 mg by mouth daily at bedtime as needed   metoprolol tartrate (LOPRESSOR) 50 mg tablet  Self Yes No   Sig: Take 50 mg by mouth every 12 (twelve) hours     omega-3-acid ethyl esters (LOVAZA) 1 g capsule  Self Yes No   omeprazole (PriLOSEC) 40 MG capsule  Self Yes No   Sig: Take 40 mg by mouth daily    oxybutynin (DITROPAN) 5 mg tablet  Self Yes No   Sig: Take 10 mg by mouth daily    pioglitazone (ACTOS) 30 mg tablet  Self Yes No   Sig: Take 30 mg by mouth daily   pramipexole (MIRAPEX) 0 25 mg tablet  Self Yes No   Sig: Take 0 25 mg by mouth daily   torsemide (DEMADEX) 5 MG tablet   No No   Sig: Take 2 tablets (10 mg total) by mouth daily   vitamin E, tocopherol, 1,000 units capsule  Self Yes No   Sig: Take 2,000 Units by mouth daily       Facility-Administered Medications: None       Past Medical History:   Diagnosis Date    Anxiety     Arthritis     Diabetes mellitus ([facility]     Family history of thyroid problem     Heart burn     [facility]     Hypertension     Kidney problem     Seasonal allergies     Sleep apnea     Swollen ankles        Past Surgical History:   Procedure Laterality Date    ARTHROSCOPY KNEE      BREAST BIOPSY      CHOLECYSTECTOMY      HIATAL HERNIA REPAIR      LIPOSUCTION      REDUCTION MAMMAPLASTY      SQUAMOUS CELL CARCINOMA EXCISION      TOE SURGERY      TONSILLECTOMY      TUBAL LIGATION         Family History   Problem Relation Age of Onset    Heart disease Mother     Heart disease Father     Cancer Father         lung cancer     I have reviewed and agree with the history as documented  E-Cigarette/Vaping     E-Cigarette/Vaping Substances     Social History     Tobacco Use    Smoking status: Former Smoker     Packs/day: 2 00     Years: 10 00     Pack years: 20 00     Types: Cigarettes     Last attempt to quit:      Years since quittin 2    Smokeless tobacco: Never Used    Tobacco comment: quit in    Substance Use Topics    Alcohol use: Yes     Frequency: Monthly or less     Drinks per session: 1 or 2     Binge frequency: Never     Comment: rare    Drug use: No       Review of Systems   Constitutional: Negative  Negative for activity change, appetite change, chills, diaphoresis, fatigue and fever  HENT: Negative  Negative for congestion, drooling, ear discharge, nosebleeds, postnasal drip, rhinorrhea, sinus pressure, sinus pain, sneezing, sore throat, trouble swallowing and voice change  Eyes: Negative  Negative for photophobia and visual disturbance  Respiratory: Positive for cough and shortness of breath  Negative for apnea, chest tightness, wheezing and stridor  Cardiovascular: Negative  Negative for chest pain, palpitations and leg swelling  Gastrointestinal: Negative  Negative for abdominal distention, abdominal pain, anal bleeding, blood in stool, constipation, diarrhea, nausea and vomiting  Endocrine: Negative  Genitourinary: Negative  Negative for difficulty urinating, dysuria, flank pain, frequency, hematuria, urgency, vaginal bleeding, vaginal discharge and vaginal pain  Musculoskeletal: Negative  Negative for arthralgias, back pain, myalgias, neck pain and neck stiffness  Skin: Negative  Negative for rash and wound  Allergic/Immunologic: Negative  Neurological: Negative  Negative for dizziness, tremors, seizures, syncope, facial asymmetry, speech difficulty, weakness, light-headedness, numbness and headaches  Hematological: Negative  Does not bruise/bleed easily  Psychiatric/Behavioral: Negative for confusion  The patient is nervous/anxious  Physical Exam  Physical Exam   Constitutional: She is oriented to person, place, and time  She appears well-developed and well-nourished  Non-toxic appearance  She does not appear ill  No distress  She is not intubated  Nontoxic appearance  No obvious respiratory distress  Patient can speak in full sentences and engage in normal conversation  Patient comfortable sitting stretcher  Patient does have flat affect  HENT:   Head: Normocephalic and atraumatic  Right Ear: External ear normal    Left Ear: External ear normal    Mouth/Throat: Oropharynx is clear and moist  No oropharyngeal exudate or posterior oropharyngeal edema  Eyes: Pupils are equal, round, and reactive to light  Conjunctivae and EOM are normal    Neck: Normal range of motion  Neck supple  Cardiovascular: Normal rate, regular rhythm, normal heart sounds and intact distal pulses  Pulmonary/Chest: Effort normal and breath sounds normal  No accessory muscle usage or stridor  No apnea, no tachypnea and no bradypnea  She is not intubated  No respiratory distress  She has no decreased breath sounds  She has no wheezes  She has no rhonchi  She has no rales  Chest wall is not dull to percussion  She exhibits no mass, no tenderness, no bony tenderness, no laceration, no crepitus, no edema, no deformity, no swelling and no retraction  Abdominal: Soft  Bowel sounds are normal  She exhibits no distension, no ascites and no mass  There is no splenomegaly or hepatomegaly  There is no tenderness  There is no rebound and no guarding     Musculoskeletal: Normal range of motion  Right lower leg: Normal  She exhibits no tenderness and no edema  Left lower leg: Normal  She exhibits no tenderness and no edema  Neurological: She is alert and oriented to person, place, and time  She has normal reflexes  She is not disoriented  No cranial nerve deficit  Skin: Skin is warm and dry  Capillary refill takes less than 2 seconds  No rash noted  She is not diaphoretic  No erythema  Psychiatric: She has a normal mood and affect  Her behavior is normal  Her mood appears not anxious  She is not agitated  Nursing note and vitals reviewed  Vital Signs  ED Triage Vitals [03/30/20 0751]   Temperature Pulse Respirations Blood Pressure SpO2   97 9 °F (36 6 °C) 68 16 (!) 171/78 97 %      Temp Source Heart Rate Source Patient Position - Orthostatic VS BP Location FiO2 (%)   Oral Monitor Sitting Right arm --      Pain Score       --           Vitals:    03/30/20 0751 03/30/20 0845   BP: (!) 171/78    Pulse: 68 62   Patient Position - Orthostatic VS: Sitting          Visual Acuity      ED Medications  Medications   aluminum-magnesium hydroxide-simethicone (MYLANTA) 200-200-20 mg/5 mL oral suspension 30 mL (30 mL Oral Given 3/30/20 0822)   Lidocaine Viscous HCl (XYLOCAINE) 2 % mucosal solution 15 mL (15 mL Swish & Spit Given 3/30/20 3660)       Diagnostic Studies  Results Reviewed     None                 XR chest 1 view portable   ED Interpretation by Neil Brewer MD (03/30 9199)   No acute disease  No evidence of CHF consolidation pneumothorax  Final Result by Ravindra Busch MD (03/30 0809)      No acute cardiopulmonary disease              Workstation performed: [id number]                    Procedures  ECG 12 Lead Documentation Only  Date/Time: 3/30/2020 8:06 AM  Performed by: Neil Brewer MD  Authorized by: Neil Brewer MD     ECG reviewed by me, the ED Provider: yes    Patient location:  ED  Previous ECG:     Previous ECG:  Compared to current Similarity:  No change  Interpretation:     Interpretation: abnormal    Rate:     ECG rate:  65    ECG rate assessment: normal    Rhythm:     Rhythm: sinus rhythm    Ectopy:     Ectopy: none    QRS:     QRS axis:  Normal    QRS intervals:  Normal  Conduction:     Conduction: abnormal      Abnormal conduction: 1st degree    ST segments:     ST segments:  Normal  T waves:     T waves: normal               ED Course  ED Course as of Mar 30 0914   Mon Mar 30, 2020   0910 Patient is stable for discharge  Chest x-ray unremarkable  Patient had some relief with the GI cocktail  Discussed signs and symptoms that would require return to emergency department  Patient felt stable and comfortable discharge                                    MDM      Disposition  Final diagnoses:   Dyspnea on exertion   Flu-like symptoms   GERD (gastroesophageal reflux disease)     Time reflects when diagnosis was documented in both MDM as applicable and the Disposition within this note     Time User Action Codes Description Comment    3/30/2020  9:12 AM Kareem Felder [R06 09] Dyspnea on exertion     3/30/2020  9:12 AM Kareem Alaniz Add [R68 89] Flu-like symptoms     3/30/2020  9:12 AM Kristina Berg Add [K21 9] GERD (gastroesophageal reflux disease)       ED Disposition     ED Disposition Condition Date/Time Comment    Discharge Stable Mon Mar 30, 2020  9:12 AM [facility] [place] discharge to home/self care  Follow-up Information     Follow up With Specialties Details Why Contact Info    Balta Kraft DO Family Medicine Schedule an appointment as soon as possible for a visit   [address]   [phone]            Patient's Medications   Discharge Prescriptions    No medications on file     No discharge procedures on file      PDMP Review     None          ED Provider  Electronically Signed by           Rere Nolasco MD  03/30/20 4556

## 2020-04-13 DIAGNOSIS — N18.30 CHRONIC KIDNEY DISEASE, STAGE III (MODERATE) (HCC): ICD-10-CM

## 2020-04-13 DIAGNOSIS — I12.9 HYPERTENSIVE CHRONIC KIDNEY DISEASE WITH STAGE 1 THROUGH STAGE 4 CHRONIC KIDNEY DISEASE, OR UNSPECIFIED CHRONIC KIDNEY DISEASE: ICD-10-CM

## 2020-04-13 DIAGNOSIS — I10 ESSENTIAL HYPERTENSION: Primary | ICD-10-CM

## 2020-04-13 RX ORDER — TORSEMIDE 10 MG/1
10 TABLET ORAL DAILY
Qty: 90 TABLET | Refills: 3 | Status: ON HOLD | OUTPATIENT
Start: 2020-04-13 | End: 2020-05-05 | Stop reason: SDUPTHER

## 2020-04-13 RX ORDER — AMLODIPINE BESYLATE 5 MG/1
5 TABLET ORAL 2 TIMES DAILY
Qty: 180 TABLET | Refills: 3 | Status: SHIPPED | OUTPATIENT
Start: 2020-04-13 | End: 2020-09-29

## 2020-05-02 ENCOUNTER — HOSPITAL ENCOUNTER (INPATIENT)
Facility: HOSPITAL | Age: 70
LOS: 3 days | Discharge: HOME | DRG: 391 | End: 2020-05-05
Attending: EMERGENCY MEDICINE | Admitting: INTERNAL MEDICINE
Payer: MEDICARE

## 2020-05-02 ENCOUNTER — APPOINTMENT (EMERGENCY)
Dept: RADIOLOGY | Facility: HOSPITAL | Age: 70
DRG: 391 | End: 2020-05-02
Payer: MEDICARE

## 2020-05-02 DIAGNOSIS — R09.02 HYPOXIA: Primary | ICD-10-CM

## 2020-05-02 DIAGNOSIS — N18.30 CHRONIC KIDNEY DISEASE, STAGE III (MODERATE) (HCC): ICD-10-CM

## 2020-05-02 DIAGNOSIS — E11.21 DIABETIC NEPHROPATHY ASSOCIATED WITH TYPE 2 DIABETES MELLITUS (HCC): ICD-10-CM

## 2020-05-02 DIAGNOSIS — R06.00 DYSPNEA ON EXERTION: ICD-10-CM

## 2020-05-02 DIAGNOSIS — I12.9 HYPERTENSIVE CHRONIC KIDNEY DISEASE WITH STAGE 1 THROUGH STAGE 4 CHRONIC KIDNEY DISEASE, OR UNSPECIFIED CHRONIC KIDNEY DISEASE: ICD-10-CM

## 2020-05-02 DIAGNOSIS — N17.9 AKI (ACUTE KIDNEY INJURY) (HCC): ICD-10-CM

## 2020-05-02 DIAGNOSIS — I10 ESSENTIAL HYPERTENSION: ICD-10-CM

## 2020-05-02 DIAGNOSIS — J96.01 ACUTE RESPIRATORY FAILURE WITH HYPOXIA (HCC): ICD-10-CM

## 2020-05-02 DIAGNOSIS — K21.9 GERD (GASTROESOPHAGEAL REFLUX DISEASE): ICD-10-CM

## 2020-05-02 PROBLEM — N18.9 ACUTE KIDNEY INJURY SUPERIMPOSED ON CHRONIC KIDNEY DISEASE: Status: ACTIVE | Noted: 2018-09-19

## 2020-05-02 PROBLEM — N18.9 ACUTE KIDNEY INJURY SUPERIMPOSED ON CHRONIC KIDNEY DISEASE (HCC): Status: ACTIVE | Noted: 2018-09-19

## 2020-05-02 LAB
ALBUMIN SERPL BCP-MCNC: 3.7 G/DL (ref 3.5–5)
ALP SERPL-CCNC: 31 U/L (ref 46–116)
ALT SERPL W P-5'-P-CCNC: 34 U/L (ref 12–78)
ANION GAP SERPL CALCULATED.3IONS-SCNC: 7 MMOL/L (ref 4–13)
APTT PPP: 26 SECONDS (ref 23–37)
AST SERPL W P-5'-P-CCNC: 31 U/L (ref 5–45)
BASOPHILS # BLD AUTO: 0.02 THOUSANDS/ΜL (ref 0–0.1)
BASOPHILS NFR BLD AUTO: 0 % (ref 0–1)
BILIRUB SERPL-MCNC: 0.23 MG/DL (ref 0.2–1)
BILIRUB UR QL STRIP: NEGATIVE
BUN SERPL-MCNC: 42 MG/DL (ref 5–25)
CALCIUM SERPL-MCNC: 8.5 MG/DL (ref 8.3–10.1)
CHLORIDE SERPL-SCNC: 99 MMOL/L (ref 100–108)
CLARITY UR: CLEAR
CO2 SERPL-SCNC: 29 MMOL/L (ref 21–32)
COLOR UR: NORMAL
CREAT SERPL-MCNC: 2.64 MG/DL (ref 0.6–1.3)
D DIMER PPP FEU-MCNC: 0.44 UG/ML FEU
EOSINOPHIL # BLD AUTO: 0.38 THOUSAND/ΜL (ref 0–0.61)
EOSINOPHIL NFR BLD AUTO: 5 % (ref 0–6)
ERYTHROCYTE [DISTWIDTH] IN BLOOD BY AUTOMATED COUNT: 17.6 % (ref 11.6–15.1)
GFR SERPL CREATININE-BSD FRML MDRD: 18 ML/MIN/1.73SQ M
GLUCOSE SERPL-MCNC: 139 MG/DL (ref 65–140)
GLUCOSE SERPL-MCNC: 201 MG/DL (ref 65–140)
GLUCOSE SERPL-MCNC: 78 MG/DL (ref 65–140)
GLUCOSE UR STRIP-MCNC: NEGATIVE MG/DL
HCT VFR BLD AUTO: 37.9 % (ref 34.8–46.1)
HGB BLD-MCNC: 11.9 G/DL (ref 11.5–15.4)
HGB UR QL STRIP.AUTO: NEGATIVE
IMM GRANULOCYTES # BLD AUTO: 0.04 THOUSAND/UL (ref 0–0.2)
IMM GRANULOCYTES NFR BLD AUTO: 1 % (ref 0–2)
INR PPP: 0.97 (ref 0.84–1.19)
KETONES UR STRIP-MCNC: NEGATIVE MG/DL
LACTATE SERPL-SCNC: 0.8 MMOL/L (ref 0.5–2)
LEUKOCYTE ESTERASE UR QL STRIP: NEGATIVE
LYMPHOCYTES # BLD AUTO: 1.94 THOUSANDS/ΜL (ref 0.6–4.47)
LYMPHOCYTES NFR BLD AUTO: 25 % (ref 14–44)
MCH RBC QN AUTO: 26.6 PG (ref 26.8–34.3)
MCHC RBC AUTO-ENTMCNC: 31.4 G/DL (ref 31.4–37.4)
MCV RBC AUTO: 85 FL (ref 82–98)
MONOCYTES # BLD AUTO: 0.56 THOUSAND/ΜL (ref 0.17–1.22)
MONOCYTES NFR BLD AUTO: 7 % (ref 4–12)
NEUTROPHILS # BLD AUTO: 4.83 THOUSANDS/ΜL (ref 1.85–7.62)
NEUTS SEG NFR BLD AUTO: 62 % (ref 43–75)
NITRITE UR QL STRIP: NEGATIVE
NRBC BLD AUTO-RTO: 0 /100 WBCS
NT-PROBNP SERPL-MCNC: 531 PG/ML
PH UR STRIP.AUTO: 7 [PH]
PLATELET # BLD AUTO: 334 THOUSANDS/UL (ref 149–390)
PLATELET # BLD AUTO: 347 THOUSANDS/UL (ref 149–390)
PMV BLD AUTO: 11 FL (ref 8.9–12.7)
PMV BLD AUTO: 11.7 FL (ref 8.9–12.7)
POTASSIUM SERPL-SCNC: 4.3 MMOL/L (ref 3.5–5.3)
PROT SERPL-MCNC: 7.1 G/DL (ref 6.4–8.2)
PROT UR STRIP-MCNC: NEGATIVE MG/DL
PROTHROMBIN TIME: 12.3 SECONDS (ref 11.6–14.5)
RBC # BLD AUTO: 4.47 MILLION/UL (ref 3.81–5.12)
SARS-COV-2 RNA RESP QL NAA+PROBE: NEGATIVE
SODIUM SERPL-SCNC: 135 MMOL/L (ref 136–145)
SP GR UR STRIP.AUTO: 1.01 (ref 1–1.03)
TROPONIN I SERPL-MCNC: <0.02 NG/ML
UROBILINOGEN UR QL STRIP.AUTO: 0.2 E.U./DL
WBC # BLD AUTO: 7.77 THOUSAND/UL (ref 4.31–10.16)

## 2020-05-02 PROCEDURE — 84145 PROCALCITONIN (PCT): CPT | Performed by: EMERGENCY MEDICINE

## 2020-05-02 PROCEDURE — 85730 THROMBOPLASTIN TIME PARTIAL: CPT | Performed by: EMERGENCY MEDICINE

## 2020-05-02 PROCEDURE — 99285 EMERGENCY DEPT VISIT HI MDM: CPT

## 2020-05-02 PROCEDURE — 85610 PROTHROMBIN TIME: CPT | Performed by: EMERGENCY MEDICINE

## 2020-05-02 PROCEDURE — 81003 URINALYSIS AUTO W/O SCOPE: CPT | Performed by: EMERGENCY MEDICINE

## 2020-05-02 PROCEDURE — 36415 COLL VENOUS BLD VENIPUNCTURE: CPT | Performed by: EMERGENCY MEDICINE

## 2020-05-02 PROCEDURE — 85379 FIBRIN DEGRADATION QUANT: CPT | Performed by: PHYSICIAN ASSISTANT

## 2020-05-02 PROCEDURE — 87040 BLOOD CULTURE FOR BACTERIA: CPT | Performed by: EMERGENCY MEDICINE

## 2020-05-02 PROCEDURE — 85025 COMPLETE CBC W/AUTO DIFF WBC: CPT | Performed by: EMERGENCY MEDICINE

## 2020-05-02 PROCEDURE — 84484 ASSAY OF TROPONIN QUANT: CPT | Performed by: PHYSICIAN ASSISTANT

## 2020-05-02 PROCEDURE — 99285 EMERGENCY DEPT VISIT HI MDM: CPT | Performed by: EMERGENCY MEDICINE

## 2020-05-02 PROCEDURE — 87635 SARS-COV-2 COVID-19 AMP PRB: CPT | Performed by: EMERGENCY MEDICINE

## 2020-05-02 PROCEDURE — 99223 1ST HOSP IP/OBS HIGH 75: CPT | Performed by: INTERNAL MEDICINE

## 2020-05-02 PROCEDURE — 83880 ASSAY OF NATRIURETIC PEPTIDE: CPT | Performed by: EMERGENCY MEDICINE

## 2020-05-02 PROCEDURE — 71045 X-RAY EXAM CHEST 1 VIEW: CPT

## 2020-05-02 PROCEDURE — 82948 REAGENT STRIP/BLOOD GLUCOSE: CPT

## 2020-05-02 PROCEDURE — 85049 AUTOMATED PLATELET COUNT: CPT | Performed by: PHYSICIAN ASSISTANT

## 2020-05-02 PROCEDURE — 93005 ELECTROCARDIOGRAM TRACING: CPT

## 2020-05-02 PROCEDURE — 80053 COMPREHEN METABOLIC PANEL: CPT | Performed by: EMERGENCY MEDICINE

## 2020-05-02 PROCEDURE — 84484 ASSAY OF TROPONIN QUANT: CPT | Performed by: EMERGENCY MEDICINE

## 2020-05-02 PROCEDURE — 83605 ASSAY OF LACTIC ACID: CPT | Performed by: EMERGENCY MEDICINE

## 2020-05-02 RX ORDER — CALCIUM CARBONATE 500(1250)
1 TABLET ORAL 2 TIMES DAILY WITH MEALS
Status: DISCONTINUED | OUTPATIENT
Start: 2020-05-02 | End: 2020-05-05 | Stop reason: HOSPADM

## 2020-05-02 RX ORDER — LEVOTHYROXINE SODIUM 175 UG/1
175 TABLET ORAL
Status: DISCONTINUED | OUTPATIENT
Start: 2020-05-03 | End: 2020-05-05 | Stop reason: HOSPADM

## 2020-05-02 RX ORDER — AMLODIPINE BESYLATE 5 MG/1
5 TABLET ORAL 2 TIMES DAILY
Status: DISCONTINUED | OUTPATIENT
Start: 2020-05-02 | End: 2020-05-05 | Stop reason: HOSPADM

## 2020-05-02 RX ORDER — PRAMIPEXOLE DIHYDROCHLORIDE 0.25 MG/1
0.25 TABLET ORAL DAILY
Status: DISCONTINUED | OUTPATIENT
Start: 2020-05-03 | End: 2020-05-03

## 2020-05-02 RX ORDER — PANTOPRAZOLE SODIUM 40 MG/1
40 TABLET, DELAYED RELEASE ORAL
Status: DISCONTINUED | OUTPATIENT
Start: 2020-05-03 | End: 2020-05-04

## 2020-05-02 RX ORDER — ONDANSETRON 2 MG/ML
4 INJECTION INTRAMUSCULAR; INTRAVENOUS EVERY 6 HOURS PRN
Status: DISCONTINUED | OUTPATIENT
Start: 2020-05-02 | End: 2020-05-05 | Stop reason: HOSPADM

## 2020-05-02 RX ORDER — FENOFIBRATE 145 MG/1
145 TABLET, COATED ORAL
COMMUNITY
End: 2020-05-05 | Stop reason: HOSPADM

## 2020-05-02 RX ORDER — SODIUM CHLORIDE 9 MG/ML
75 INJECTION, SOLUTION INTRAVENOUS CONTINUOUS
Status: DISCONTINUED | OUTPATIENT
Start: 2020-05-02 | End: 2020-05-02

## 2020-05-02 RX ORDER — CHLORAL HYDRATE 500 MG
1000 CAPSULE ORAL 2 TIMES DAILY
Status: DISCONTINUED | OUTPATIENT
Start: 2020-05-02 | End: 2020-05-05 | Stop reason: HOSPADM

## 2020-05-02 RX ORDER — SODIUM CHLORIDE 9 MG/ML
50 INJECTION, SOLUTION INTRAVENOUS CONTINUOUS
Status: DISPENSED | OUTPATIENT
Start: 2020-05-02 | End: 2020-05-03

## 2020-05-02 RX ORDER — QUININE SULFATE 324 MG/1
500 CAPSULE ORAL 2 TIMES DAILY
COMMUNITY
End: 2020-05-05 | Stop reason: HOSPADM

## 2020-05-02 RX ORDER — GABAPENTIN 300 MG/1
300 CAPSULE ORAL 2 TIMES DAILY
Status: DISCONTINUED | OUTPATIENT
Start: 2020-05-02 | End: 2020-05-05 | Stop reason: HOSPADM

## 2020-05-02 RX ORDER — OXYBUTYNIN CHLORIDE 5 MG/1
10 TABLET ORAL DAILY
Status: DISCONTINUED | OUTPATIENT
Start: 2020-05-03 | End: 2020-05-05 | Stop reason: HOSPADM

## 2020-05-02 RX ORDER — METOPROLOL TARTRATE 50 MG/1
50 TABLET, FILM COATED ORAL EVERY 12 HOURS SCHEDULED
Status: DISCONTINUED | OUTPATIENT
Start: 2020-05-02 | End: 2020-05-05 | Stop reason: HOSPADM

## 2020-05-02 RX ORDER — CALCIUM CARBONATE 200(500)MG
1000 TABLET,CHEWABLE ORAL DAILY PRN
Status: DISCONTINUED | OUTPATIENT
Start: 2020-05-02 | End: 2020-05-05 | Stop reason: HOSPADM

## 2020-05-02 RX ORDER — ALBUTEROL SULFATE 90 UG/1
2 AEROSOL, METERED RESPIRATORY (INHALATION) ONCE
Status: COMPLETED | OUTPATIENT
Start: 2020-05-02 | End: 2020-05-02

## 2020-05-02 RX ORDER — CYCLOBENZAPRINE HCL 10 MG
10 TABLET ORAL DAILY
Status: DISCONTINUED | OUTPATIENT
Start: 2020-05-03 | End: 2020-05-05 | Stop reason: HOSPADM

## 2020-05-02 RX ORDER — GABAPENTIN 300 MG/1
300 CAPSULE ORAL
Status: DISCONTINUED | OUTPATIENT
Start: 2020-05-02 | End: 2020-05-05 | Stop reason: HOSPADM

## 2020-05-02 RX ORDER — ALBUTEROL SULFATE 90 UG/1
2 AEROSOL, METERED RESPIRATORY (INHALATION) EVERY 6 HOURS PRN
Status: DISCONTINUED | OUTPATIENT
Start: 2020-05-02 | End: 2020-05-05 | Stop reason: HOSPADM

## 2020-05-02 RX ORDER — HEPARIN SODIUM 5000 [USP'U]/ML
5000 INJECTION, SOLUTION INTRAVENOUS; SUBCUTANEOUS EVERY 8 HOURS SCHEDULED
Status: DISCONTINUED | OUTPATIENT
Start: 2020-05-02 | End: 2020-05-05 | Stop reason: HOSPADM

## 2020-05-02 RX ORDER — SODIUM CHLORIDE 9 MG/ML
3 INJECTION INTRAVENOUS
Status: DISCONTINUED | OUTPATIENT
Start: 2020-05-02 | End: 2020-05-05 | Stop reason: HOSPADM

## 2020-05-02 RX ORDER — CHLORAL HYDRATE 500 MG
1000 CAPSULE ORAL DAILY
Status: DISCONTINUED | OUTPATIENT
Start: 2020-05-03 | End: 2020-05-04

## 2020-05-02 RX ADMIN — ALBUTEROL SULFATE 2 PUFF: 90 AEROSOL, METERED RESPIRATORY (INHALATION) at 14:37

## 2020-05-02 RX ADMIN — ALBUTEROL SULFATE 2 PUFF: 90 AEROSOL, METERED RESPIRATORY (INHALATION) at 21:48

## 2020-05-02 RX ADMIN — CALCIUM 1 TABLET: 500 TABLET ORAL at 18:40

## 2020-05-02 RX ADMIN — HEPARIN SODIUM 5000 UNITS: 5000 INJECTION INTRAVENOUS; SUBCUTANEOUS at 21:38

## 2020-05-02 RX ADMIN — INSULIN LISPRO 1 UNITS: 100 INJECTION, SOLUTION INTRAVENOUS; SUBCUTANEOUS at 21:43

## 2020-05-02 RX ADMIN — METOPROLOL TARTRATE 50 MG: 50 TABLET, FILM COATED ORAL at 21:38

## 2020-05-02 RX ADMIN — AMLODIPINE BESYLATE 5 MG: 5 TABLET ORAL at 18:40

## 2020-05-02 RX ADMIN — SODIUM CHLORIDE 75 ML/HR: 0.9 INJECTION, SOLUTION INTRAVENOUS at 17:13

## 2020-05-02 RX ADMIN — GABAPENTIN 300 MG: 300 CAPSULE ORAL at 21:38

## 2020-05-02 RX ADMIN — GABAPENTIN 300 MG: 300 CAPSULE ORAL at 21:40

## 2020-05-02 RX ADMIN — SODIUM CHLORIDE 50 ML/HR: 0.9 INJECTION, SOLUTION INTRAVENOUS at 18:44

## 2020-05-02 RX ADMIN — Medication 400 MG: at 21:38

## 2020-05-03 ENCOUNTER — APPOINTMENT (INPATIENT)
Dept: NON INVASIVE DIAGNOSTICS | Facility: HOSPITAL | Age: 70
DRG: 391 | End: 2020-05-03
Payer: MEDICARE

## 2020-05-03 ENCOUNTER — APPOINTMENT (INPATIENT)
Dept: CT IMAGING | Facility: HOSPITAL | Age: 70
DRG: 391 | End: 2020-05-03
Payer: MEDICARE

## 2020-05-03 PROBLEM — Z98.890 HISTORY OF REPAIR OF HIATAL HERNIA: Status: ACTIVE | Noted: 2020-05-03

## 2020-05-03 PROBLEM — Z87.19 HISTORY OF REPAIR OF HIATAL HERNIA: Status: ACTIVE | Noted: 2020-05-03

## 2020-05-03 LAB
ANION GAP SERPL CALCULATED.3IONS-SCNC: 7 MMOL/L (ref 4–13)
BUN SERPL-MCNC: 40 MG/DL (ref 5–25)
CALCIUM SERPL-MCNC: 8.3 MG/DL (ref 8.3–10.1)
CHLORIDE SERPL-SCNC: 104 MMOL/L (ref 100–108)
CO2 SERPL-SCNC: 28 MMOL/L (ref 21–32)
CREAT SERPL-MCNC: 2.6 MG/DL (ref 0.6–1.3)
ERYTHROCYTE [DISTWIDTH] IN BLOOD BY AUTOMATED COUNT: 17.7 % (ref 11.6–15.1)
GFR SERPL CREATININE-BSD FRML MDRD: 18 ML/MIN/1.73SQ M
GLUCOSE SERPL-MCNC: 104 MG/DL (ref 65–140)
GLUCOSE SERPL-MCNC: 114 MG/DL (ref 65–140)
GLUCOSE SERPL-MCNC: 118 MG/DL (ref 65–140)
GLUCOSE SERPL-MCNC: 133 MG/DL (ref 65–140)
GLUCOSE SERPL-MCNC: 136 MG/DL (ref 65–140)
GLUCOSE SERPL-MCNC: 194 MG/DL (ref 65–140)
GLUCOSE SERPL-MCNC: 63 MG/DL (ref 65–140)
HCT VFR BLD AUTO: 35.9 % (ref 34.8–46.1)
HGB BLD-MCNC: 11.2 G/DL (ref 11.5–15.4)
INR PPP: 1.02 (ref 0.84–1.19)
MCH RBC QN AUTO: 27 PG (ref 26.8–34.3)
MCHC RBC AUTO-ENTMCNC: 31.2 G/DL (ref 31.4–37.4)
MCV RBC AUTO: 87 FL (ref 82–98)
PLATELET # BLD AUTO: 310 THOUSANDS/UL (ref 149–390)
PMV BLD AUTO: 11.3 FL (ref 8.9–12.7)
POTASSIUM SERPL-SCNC: 4.1 MMOL/L (ref 3.5–5.3)
PROCALCITONIN SERPL-MCNC: <0.05 NG/ML
PROTHROMBIN TIME: 12.8 SECONDS (ref 11.6–14.5)
RBC # BLD AUTO: 4.15 MILLION/UL (ref 3.81–5.12)
SODIUM SERPL-SCNC: 139 MMOL/L (ref 136–145)
WBC # BLD AUTO: 6.89 THOUSAND/UL (ref 4.31–10.16)

## 2020-05-03 PROCEDURE — 85610 PROTHROMBIN TIME: CPT | Performed by: PHYSICIAN ASSISTANT

## 2020-05-03 PROCEDURE — 74176 CT ABD & PELVIS W/O CONTRAST: CPT

## 2020-05-03 PROCEDURE — 80048 BASIC METABOLIC PNL TOTAL CA: CPT | Performed by: PHYSICIAN ASSISTANT

## 2020-05-03 PROCEDURE — 93306 TTE W/DOPPLER COMPLETE: CPT | Performed by: INTERNAL MEDICINE

## 2020-05-03 PROCEDURE — 99223 1ST HOSP IP/OBS HIGH 75: CPT | Performed by: INTERNAL MEDICINE

## 2020-05-03 PROCEDURE — 82948 REAGENT STRIP/BLOOD GLUCOSE: CPT

## 2020-05-03 PROCEDURE — 99232 SBSQ HOSP IP/OBS MODERATE 35: CPT | Performed by: PHYSICIAN ASSISTANT

## 2020-05-03 PROCEDURE — 93306 TTE W/DOPPLER COMPLETE: CPT

## 2020-05-03 PROCEDURE — 85027 COMPLETE CBC AUTOMATED: CPT | Performed by: PHYSICIAN ASSISTANT

## 2020-05-03 RX ORDER — PRAMIPEXOLE DIHYDROCHLORIDE 0.25 MG/1
0.25 TABLET ORAL
Status: DISCONTINUED | OUTPATIENT
Start: 2020-05-04 | End: 2020-05-05 | Stop reason: HOSPADM

## 2020-05-03 RX ORDER — LANOLIN ALCOHOL/MO/W.PET/CERES
6 CREAM (GRAM) TOPICAL
Status: DISCONTINUED | OUTPATIENT
Start: 2020-05-03 | End: 2020-05-05 | Stop reason: HOSPADM

## 2020-05-03 RX ORDER — FLUTICASONE FUROATE AND VILANTEROL 200; 25 UG/1; UG/1
1 POWDER RESPIRATORY (INHALATION) DAILY
Status: DISCONTINUED | OUTPATIENT
Start: 2020-05-03 | End: 2020-05-05 | Stop reason: HOSPADM

## 2020-05-03 RX ORDER — FAMOTIDINE 20 MG/1
10 TABLET, FILM COATED ORAL
Status: DISCONTINUED | OUTPATIENT
Start: 2020-05-03 | End: 2020-05-05 | Stop reason: HOSPADM

## 2020-05-03 RX ORDER — SODIUM CHLORIDE 9 MG/ML
50 INJECTION, SOLUTION INTRAVENOUS CONTINUOUS
Status: DISCONTINUED | OUTPATIENT
Start: 2020-05-03 | End: 2020-05-03

## 2020-05-03 RX ADMIN — DULOXETINE HYDROCHLORIDE 90 MG: 60 CAPSULE, DELAYED RELEASE ORAL at 09:25

## 2020-05-03 RX ADMIN — Medication 1000 MG: at 09:25

## 2020-05-03 RX ADMIN — PANTOPRAZOLE SODIUM 40 MG: 40 TABLET, DELAYED RELEASE ORAL at 06:04

## 2020-05-03 RX ADMIN — GABAPENTIN 300 MG: 300 CAPSULE ORAL at 21:40

## 2020-05-03 RX ADMIN — AMLODIPINE BESYLATE 5 MG: 5 TABLET ORAL at 17:12

## 2020-05-03 RX ADMIN — LEVOTHYROXINE SODIUM 175 MCG: 175 TABLET ORAL at 06:00

## 2020-05-03 RX ADMIN — HEPARIN SODIUM 5000 UNITS: 5000 INJECTION INTRAVENOUS; SUBCUTANEOUS at 21:39

## 2020-05-03 RX ADMIN — Medication 400 MG: at 17:12

## 2020-05-03 RX ADMIN — FAMOTIDINE 10 MG: 20 TABLET ORAL at 21:40

## 2020-05-03 RX ADMIN — Medication 400 MG: at 09:26

## 2020-05-03 RX ADMIN — MELATONIN 6 MG: at 22:09

## 2020-05-03 RX ADMIN — HEPARIN SODIUM 5000 UNITS: 5000 INJECTION INTRAVENOUS; SUBCUTANEOUS at 06:00

## 2020-05-03 RX ADMIN — METOPROLOL TARTRATE 50 MG: 50 TABLET, FILM COATED ORAL at 09:26

## 2020-05-03 RX ADMIN — AMLODIPINE BESYLATE 5 MG: 5 TABLET ORAL at 09:25

## 2020-05-03 RX ADMIN — INSULIN LISPRO 3 UNITS: 100 INJECTION, SOLUTION INTRAVENOUS; SUBCUTANEOUS at 12:27

## 2020-05-03 RX ADMIN — ZINC 1 TABLET: TAB ORAL at 09:26

## 2020-05-03 RX ADMIN — OXYBUTYNIN CHLORIDE 10 MG: 5 TABLET ORAL at 09:26

## 2020-05-03 RX ADMIN — IOHEXOL 50 ML: 240 INJECTION, SOLUTION INTRATHECAL; INTRAVASCULAR; INTRAVENOUS; ORAL at 15:37

## 2020-05-03 RX ADMIN — PRAMIPEXOLE DIHYDROCHLORIDE 0.25 MG: 0.25 TABLET ORAL at 09:26

## 2020-05-03 RX ADMIN — METOPROLOL TARTRATE 50 MG: 50 TABLET, FILM COATED ORAL at 21:40

## 2020-05-03 RX ADMIN — GABAPENTIN 300 MG: 300 CAPSULE ORAL at 17:26

## 2020-05-03 RX ADMIN — ALBUTEROL SULFATE 2 PUFF: 90 AEROSOL, METERED RESPIRATORY (INHALATION) at 09:35

## 2020-05-03 RX ADMIN — Medication 400 MG: at 21:40

## 2020-05-03 RX ADMIN — INSULIN DETEMIR 16 UNITS: 100 INJECTION, SOLUTION SUBCUTANEOUS at 09:36

## 2020-05-03 RX ADMIN — CALCIUM 1 TABLET: 500 TABLET ORAL at 09:25

## 2020-05-03 RX ADMIN — INSULIN LISPRO 2 UNITS: 100 INJECTION, SOLUTION INTRAVENOUS; SUBCUTANEOUS at 12:26

## 2020-05-03 RX ADMIN — CALCIUM 1 TABLET: 500 TABLET ORAL at 17:12

## 2020-05-04 PROBLEM — E66.811 CLASS 1 OBESITY IN ADULT: Status: ACTIVE | Noted: 2020-05-04

## 2020-05-04 PROBLEM — E66.9 CLASS 1 OBESITY IN ADULT: Status: ACTIVE | Noted: 2020-05-04

## 2020-05-04 LAB
ANION GAP SERPL CALCULATED.3IONS-SCNC: 6 MMOL/L (ref 4–13)
ATRIAL RATE: 58 BPM
ATRIAL RATE: 68 BPM
BUN SERPL-MCNC: 37 MG/DL (ref 5–25)
CALCIUM SERPL-MCNC: 8.9 MG/DL (ref 8.3–10.1)
CHLORIDE SERPL-SCNC: 107 MMOL/L (ref 100–108)
CO2 SERPL-SCNC: 27 MMOL/L (ref 21–32)
CREAT SERPL-MCNC: 2.16 MG/DL (ref 0.6–1.3)
GFR SERPL CREATININE-BSD FRML MDRD: 22 ML/MIN/1.73SQ M
GLUCOSE SERPL-MCNC: 119 MG/DL (ref 65–140)
GLUCOSE SERPL-MCNC: 154 MG/DL (ref 65–140)
GLUCOSE SERPL-MCNC: 156 MG/DL (ref 65–140)
GLUCOSE SERPL-MCNC: 174 MG/DL (ref 65–140)
GLUCOSE SERPL-MCNC: 77 MG/DL (ref 65–140)
P AXIS: 28 DEGREES
P AXIS: 44 DEGREES
POTASSIUM SERPL-SCNC: 4.4 MMOL/L (ref 3.5–5.3)
PR INTERVAL: 228 MS
PR INTERVAL: 254 MS
QRS AXIS: -5 DEGREES
QRS AXIS: 11 DEGREES
QRSD INTERVAL: 86 MS
QRSD INTERVAL: 88 MS
QT INTERVAL: 394 MS
QT INTERVAL: 410 MS
QTC INTERVAL: 402 MS
QTC INTERVAL: 409 MS
SODIUM SERPL-SCNC: 140 MMOL/L (ref 136–145)
T WAVE AXIS: -1 DEGREES
T WAVE AXIS: 8 DEGREES
VENTRICULAR RATE: 58 BPM
VENTRICULAR RATE: 65 BPM

## 2020-05-04 PROCEDURE — 94660 CPAP INITIATION&MGMT: CPT

## 2020-05-04 PROCEDURE — 92610 EVALUATE SWALLOWING FUNCTION: CPT

## 2020-05-04 PROCEDURE — 93010 ELECTROCARDIOGRAM REPORT: CPT | Performed by: INTERNAL MEDICINE

## 2020-05-04 PROCEDURE — 80048 BASIC METABOLIC PNL TOTAL CA: CPT | Performed by: PHYSICIAN ASSISTANT

## 2020-05-04 PROCEDURE — 93005 ELECTROCARDIOGRAM TRACING: CPT

## 2020-05-04 PROCEDURE — 99232 SBSQ HOSP IP/OBS MODERATE 35: CPT | Performed by: INTERNAL MEDICINE

## 2020-05-04 PROCEDURE — 99232 SBSQ HOSP IP/OBS MODERATE 35: CPT | Performed by: PHYSICIAN ASSISTANT

## 2020-05-04 PROCEDURE — 99222 1ST HOSP IP/OBS MODERATE 55: CPT | Performed by: INTERNAL MEDICINE

## 2020-05-04 PROCEDURE — 94760 N-INVAS EAR/PLS OXIMETRY 1: CPT

## 2020-05-04 PROCEDURE — 82948 REAGENT STRIP/BLOOD GLUCOSE: CPT

## 2020-05-04 RX ORDER — PANTOPRAZOLE SODIUM 40 MG/1
40 TABLET, DELAYED RELEASE ORAL
Status: DISCONTINUED | OUTPATIENT
Start: 2020-05-04 | End: 2020-05-05 | Stop reason: HOSPADM

## 2020-05-04 RX ADMIN — INSULIN LISPRO 1 UNITS: 100 INJECTION, SOLUTION INTRAVENOUS; SUBCUTANEOUS at 12:46

## 2020-05-04 RX ADMIN — HEPARIN SODIUM 5000 UNITS: 5000 INJECTION INTRAVENOUS; SUBCUTANEOUS at 14:28

## 2020-05-04 RX ADMIN — AMLODIPINE BESYLATE 5 MG: 5 TABLET ORAL at 08:01

## 2020-05-04 RX ADMIN — Medication 1000 MG: at 08:00

## 2020-05-04 RX ADMIN — Medication 1000 MG: at 17:11

## 2020-05-04 RX ADMIN — FAMOTIDINE 10 MG: 20 TABLET ORAL at 22:14

## 2020-05-04 RX ADMIN — METOPROLOL TARTRATE 50 MG: 50 TABLET, FILM COATED ORAL at 22:14

## 2020-05-04 RX ADMIN — HEPARIN SODIUM 5000 UNITS: 5000 INJECTION INTRAVENOUS; SUBCUTANEOUS at 22:14

## 2020-05-04 RX ADMIN — INSULIN LISPRO 1 UNITS: 100 INJECTION, SOLUTION INTRAVENOUS; SUBCUTANEOUS at 17:13

## 2020-05-04 RX ADMIN — CALCIUM 1 TABLET: 500 TABLET ORAL at 17:12

## 2020-05-04 RX ADMIN — METOPROLOL TARTRATE 50 MG: 50 TABLET, FILM COATED ORAL at 08:01

## 2020-05-04 RX ADMIN — Medication 400 MG: at 17:12

## 2020-05-04 RX ADMIN — GABAPENTIN 300 MG: 300 CAPSULE ORAL at 22:14

## 2020-05-04 RX ADMIN — LEVOTHYROXINE SODIUM 175 MCG: 175 TABLET ORAL at 06:29

## 2020-05-04 RX ADMIN — PANTOPRAZOLE SODIUM 40 MG: 40 TABLET, DELAYED RELEASE ORAL at 17:11

## 2020-05-04 RX ADMIN — Medication 400 MG: at 22:15

## 2020-05-04 RX ADMIN — AMLODIPINE BESYLATE 5 MG: 5 TABLET ORAL at 17:11

## 2020-05-04 RX ADMIN — Medication 400 MG: at 08:01

## 2020-05-04 RX ADMIN — PRAMIPEXOLE DIHYDROCHLORIDE 0.25 MG: 0.25 TABLET ORAL at 22:15

## 2020-05-04 RX ADMIN — ZINC 1 TABLET: TAB ORAL at 08:00

## 2020-05-04 RX ADMIN — PANTOPRAZOLE SODIUM 40 MG: 40 TABLET, DELAYED RELEASE ORAL at 06:29

## 2020-05-04 RX ADMIN — INSULIN LISPRO 1 UNITS: 100 INJECTION, SOLUTION INTRAVENOUS; SUBCUTANEOUS at 22:16

## 2020-05-04 RX ADMIN — DULOXETINE HYDROCHLORIDE 90 MG: 60 CAPSULE, DELAYED RELEASE ORAL at 08:00

## 2020-05-04 RX ADMIN — OXYBUTYNIN CHLORIDE 10 MG: 5 TABLET ORAL at 08:01

## 2020-05-04 RX ADMIN — HEPARIN SODIUM 5000 UNITS: 5000 INJECTION INTRAVENOUS; SUBCUTANEOUS at 06:29

## 2020-05-04 RX ADMIN — MELATONIN 6 MG: at 22:15

## 2020-05-04 RX ADMIN — FLUTICASONE FUROATE AND VILANTEROL TRIFENATATE 1 PUFF: 200; 25 POWDER RESPIRATORY (INHALATION) at 08:02

## 2020-05-04 RX ADMIN — CALCIUM 1 TABLET: 500 TABLET ORAL at 08:00

## 2020-05-04 RX ADMIN — INSULIN DETEMIR 16 UNITS: 100 INJECTION, SOLUTION SUBCUTANEOUS at 08:01

## 2020-05-05 ENCOUNTER — APPOINTMENT (INPATIENT)
Dept: RADIOLOGY | Facility: HOSPITAL | Age: 70
DRG: 391 | End: 2020-05-05
Payer: MEDICARE

## 2020-05-05 ENCOUNTER — TELEPHONE (OUTPATIENT)
Dept: GASTROENTEROLOGY | Facility: AMBULARY SURGERY CENTER | Age: 70
End: 2020-05-05

## 2020-05-05 VITALS
OXYGEN SATURATION: 94 % | HEART RATE: 62 BPM | HEIGHT: 62 IN | SYSTOLIC BLOOD PRESSURE: 173 MMHG | TEMPERATURE: 98.1 F | RESPIRATION RATE: 18 BRPM | BODY MASS INDEX: 34.96 KG/M2 | DIASTOLIC BLOOD PRESSURE: 76 MMHG | WEIGHT: 190 LBS

## 2020-05-05 DIAGNOSIS — N17.9 AKI (ACUTE KIDNEY INJURY) (HCC): Primary | ICD-10-CM

## 2020-05-05 LAB
ANION GAP SERPL CALCULATED.3IONS-SCNC: 8 MMOL/L (ref 4–13)
BUN SERPL-MCNC: 28 MG/DL (ref 5–25)
CALCIUM SERPL-MCNC: 8.8 MG/DL (ref 8.3–10.1)
CHLORIDE SERPL-SCNC: 103 MMOL/L (ref 100–108)
CO2 SERPL-SCNC: 27 MMOL/L (ref 21–32)
CREAT SERPL-MCNC: 2.1 MG/DL (ref 0.6–1.3)
GFR SERPL CREATININE-BSD FRML MDRD: 23 ML/MIN/1.73SQ M
GLUCOSE SERPL-MCNC: 136 MG/DL (ref 65–140)
GLUCOSE SERPL-MCNC: 137 MG/DL (ref 65–140)
GLUCOSE SERPL-MCNC: 169 MG/DL (ref 65–140)
POTASSIUM SERPL-SCNC: 3.9 MMOL/L (ref 3.5–5.3)
SODIUM SERPL-SCNC: 138 MMOL/L (ref 136–145)

## 2020-05-05 PROCEDURE — 99232 SBSQ HOSP IP/OBS MODERATE 35: CPT | Performed by: PHYSICIAN ASSISTANT

## 2020-05-05 PROCEDURE — 82948 REAGENT STRIP/BLOOD GLUCOSE: CPT

## 2020-05-05 PROCEDURE — 99239 HOSP IP/OBS DSCHRG MGMT >30: CPT | Performed by: PHYSICIAN ASSISTANT

## 2020-05-05 PROCEDURE — 80048 BASIC METABOLIC PNL TOTAL CA: CPT | Performed by: PHYSICIAN ASSISTANT

## 2020-05-05 PROCEDURE — 74240 X-RAY XM UPR GI TRC 1CNTRST: CPT

## 2020-05-05 RX ORDER — PANTOPRAZOLE SODIUM 40 MG/1
40 TABLET, DELAYED RELEASE ORAL
Qty: 60 TABLET | Refills: 0 | Status: SHIPPED | OUTPATIENT
Start: 2020-05-05 | End: 2020-06-04 | Stop reason: SDUPTHER

## 2020-05-05 RX ORDER — FLUTICASONE FUROATE AND VILANTEROL 200; 25 UG/1; UG/1
1 POWDER RESPIRATORY (INHALATION) DAILY
Qty: 1 INHALER | Refills: 0 | Status: SHIPPED | OUTPATIENT
Start: 2020-05-06 | End: 2020-07-27

## 2020-05-05 RX ORDER — TORSEMIDE 10 MG/1
10 TABLET ORAL DAILY
Qty: 90 TABLET | Refills: 0
Start: 2020-05-07 | End: 2020-08-03

## 2020-05-05 RX ORDER — FAMOTIDINE 40 MG/1
40 TABLET, FILM COATED ORAL
Qty: 30 TABLET | Refills: 0 | Status: SHIPPED | OUTPATIENT
Start: 2020-05-05 | End: 2020-06-04 | Stop reason: SDUPTHER

## 2020-05-05 RX ORDER — ALBUTEROL SULFATE 90 UG/1
2 AEROSOL, METERED RESPIRATORY (INHALATION) EVERY 6 HOURS PRN
Refills: 0
Start: 2020-05-05 | End: 2022-08-04

## 2020-05-05 RX ORDER — CALCIUM CARBONATE 200(500)MG
1000 TABLET,CHEWABLE ORAL DAILY PRN
Refills: 0
Start: 2020-05-05 | End: 2021-02-03

## 2020-05-05 RX ADMIN — CYCLOBENZAPRINE HYDROCHLORIDE 10 MG: 10 TABLET, FILM COATED ORAL at 09:56

## 2020-05-05 RX ADMIN — Medication 1000 MG: at 09:56

## 2020-05-05 RX ADMIN — CALCIUM 1 TABLET: 500 TABLET ORAL at 09:56

## 2020-05-05 RX ADMIN — OXYBUTYNIN CHLORIDE 10 MG: 5 TABLET ORAL at 09:56

## 2020-05-05 RX ADMIN — DULOXETINE HYDROCHLORIDE 90 MG: 60 CAPSULE, DELAYED RELEASE ORAL at 09:56

## 2020-05-05 RX ADMIN — FLUTICASONE FUROATE AND VILANTEROL TRIFENATATE 1 PUFF: 200; 25 POWDER RESPIRATORY (INHALATION) at 09:57

## 2020-05-05 RX ADMIN — ZINC 1 TABLET: TAB ORAL at 09:56

## 2020-05-05 RX ADMIN — Medication 400 MG: at 09:56

## 2020-05-05 RX ADMIN — METOPROLOL TARTRATE 50 MG: 50 TABLET, FILM COATED ORAL at 09:56

## 2020-05-05 RX ADMIN — INSULIN LISPRO 1 UNITS: 100 INJECTION, SOLUTION INTRAVENOUS; SUBCUTANEOUS at 12:07

## 2020-05-05 RX ADMIN — PANTOPRAZOLE SODIUM 40 MG: 40 TABLET, DELAYED RELEASE ORAL at 09:56

## 2020-05-05 RX ADMIN — AMLODIPINE BESYLATE 5 MG: 5 TABLET ORAL at 09:56

## 2020-05-05 RX ADMIN — INSULIN DETEMIR 10 UNITS: 100 INJECTION, SOLUTION SUBCUTANEOUS at 09:56

## 2020-05-08 ENCOUNTER — TRANSCRIBE ORDERS (OUTPATIENT)
Dept: LAB | Facility: CLINIC | Age: 70
End: 2020-05-08

## 2020-05-08 ENCOUNTER — APPOINTMENT (OUTPATIENT)
Dept: LAB | Facility: CLINIC | Age: 70
End: 2020-05-08
Payer: MEDICARE

## 2020-05-08 DIAGNOSIS — N17.9 AKI (ACUTE KIDNEY INJURY) (HCC): ICD-10-CM

## 2020-05-08 PROBLEM — D63.1 ANEMIA OF CHRONIC RENAL FAILURE, STAGE 3 (MODERATE) (HCC): Status: ACTIVE | Noted: 2020-05-08

## 2020-05-08 PROBLEM — E55.9 VITAMIN D DEFICIENCY: Status: ACTIVE | Noted: 2020-05-08

## 2020-05-08 PROBLEM — N18.30 CHRONIC KIDNEY DISEASE, STAGE III (MODERATE) (HCC): Status: ACTIVE | Noted: 2020-05-08

## 2020-05-08 PROBLEM — N18.30 ANEMIA OF CHRONIC RENAL FAILURE, STAGE 3 (MODERATE) (HCC): Status: ACTIVE | Noted: 2020-05-08

## 2020-05-08 PROBLEM — D63.1 ANEMIA OF CHRONIC RENAL FAILURE, STAGE 3 (MODERATE): Status: ACTIVE | Noted: 2020-05-08

## 2020-05-08 PROBLEM — E78.5 DYSLIPIDEMIA: Status: ACTIVE | Noted: 2020-05-08

## 2020-05-08 PROBLEM — N18.30 ANEMIA OF CHRONIC RENAL FAILURE, STAGE 3 (MODERATE): Status: ACTIVE | Noted: 2020-05-08

## 2020-05-08 LAB
ANION GAP SERPL CALCULATED.3IONS-SCNC: 10 MMOL/L (ref 4–13)
BACTERIA BLD CULT: NORMAL
BACTERIA BLD CULT: NORMAL
BUN SERPL-MCNC: 36 MG/DL (ref 5–25)
CALCIUM SERPL-MCNC: 9.7 MG/DL (ref 8.3–10.1)
CHLORIDE SERPL-SCNC: 98 MMOL/L (ref 100–108)
CO2 SERPL-SCNC: 30 MMOL/L (ref 21–32)
CREAT SERPL-MCNC: 2.17 MG/DL (ref 0.6–1.3)
GFR SERPL CREATININE-BSD FRML MDRD: 22 ML/MIN/1.73SQ M
GLUCOSE SERPL-MCNC: 130 MG/DL (ref 65–140)
POTASSIUM SERPL-SCNC: 3.9 MMOL/L (ref 3.5–5.3)
SODIUM SERPL-SCNC: 138 MMOL/L (ref 136–145)

## 2020-05-08 PROCEDURE — 36415 COLL VENOUS BLD VENIPUNCTURE: CPT

## 2020-05-08 PROCEDURE — 80048 BASIC METABOLIC PNL TOTAL CA: CPT

## 2020-05-11 ENCOUNTER — TELEMEDICINE (OUTPATIENT)
Dept: NEPHROLOGY | Facility: CLINIC | Age: 70
End: 2020-05-11
Payer: MEDICARE

## 2020-05-11 VITALS
BODY MASS INDEX: 32.57 KG/M2 | DIASTOLIC BLOOD PRESSURE: 83 MMHG | WEIGHT: 177 LBS | HEART RATE: 60 BPM | SYSTOLIC BLOOD PRESSURE: 147 MMHG | HEIGHT: 62 IN

## 2020-05-11 DIAGNOSIS — E55.9 VITAMIN D DEFICIENCY: ICD-10-CM

## 2020-05-11 DIAGNOSIS — N18.30 CHRONIC KIDNEY DISEASE, STAGE III (MODERATE) (HCC): ICD-10-CM

## 2020-05-11 DIAGNOSIS — N17.9 AKI (ACUTE KIDNEY INJURY) (HCC): ICD-10-CM

## 2020-05-11 DIAGNOSIS — R80.1 PERSISTENT PROTEINURIA: ICD-10-CM

## 2020-05-11 DIAGNOSIS — N17.9 ACUTE KIDNEY INJURY SUPERIMPOSED ON CHRONIC KIDNEY DISEASE (HCC): ICD-10-CM

## 2020-05-11 DIAGNOSIS — E11.21 DIABETIC NEPHROPATHY ASSOCIATED WITH TYPE 2 DIABETES MELLITUS (HCC): ICD-10-CM

## 2020-05-11 DIAGNOSIS — N18.30 ANEMIA OF CHRONIC RENAL FAILURE, STAGE 3 (MODERATE) (HCC): ICD-10-CM

## 2020-05-11 DIAGNOSIS — D63.1 ANEMIA OF CHRONIC RENAL FAILURE, STAGE 3 (MODERATE) (HCC): ICD-10-CM

## 2020-05-11 DIAGNOSIS — E78.5 DYSLIPIDEMIA: ICD-10-CM

## 2020-05-11 DIAGNOSIS — I12.9 HYPERTENSIVE CHRONIC KIDNEY DISEASE WITH STAGE 1 THROUGH STAGE 4 CHRONIC KIDNEY DISEASE, OR UNSPECIFIED CHRONIC KIDNEY DISEASE: Primary | ICD-10-CM

## 2020-05-11 DIAGNOSIS — N18.9 ACUTE KIDNEY INJURY SUPERIMPOSED ON CHRONIC KIDNEY DISEASE (HCC): ICD-10-CM

## 2020-05-11 PROCEDURE — 99214 OFFICE O/P EST MOD 30 MIN: CPT | Performed by: INTERNAL MEDICINE

## 2020-05-14 ENCOUNTER — OFFICE VISIT (OUTPATIENT)
Dept: PULMONOLOGY | Facility: CLINIC | Age: 70
End: 2020-05-14
Payer: MEDICARE

## 2020-05-14 VITALS
TEMPERATURE: 97.1 F | HEART RATE: 63 BPM | HEIGHT: 62 IN | SYSTOLIC BLOOD PRESSURE: 130 MMHG | BODY MASS INDEX: 33.49 KG/M2 | OXYGEN SATURATION: 93 % | WEIGHT: 182 LBS | DIASTOLIC BLOOD PRESSURE: 60 MMHG

## 2020-05-14 DIAGNOSIS — G25.81 RLS (RESTLESS LEGS SYNDROME): Primary | ICD-10-CM

## 2020-05-14 DIAGNOSIS — G47.33 OSA ON CPAP: ICD-10-CM

## 2020-05-14 DIAGNOSIS — I27.20 PULMONARY HYPERTENSION (HCC): ICD-10-CM

## 2020-05-14 DIAGNOSIS — Z99.89 OSA ON CPAP: ICD-10-CM

## 2020-05-14 PROCEDURE — 99213 OFFICE O/P EST LOW 20 MIN: CPT | Performed by: PHYSICIAN ASSISTANT

## 2020-05-21 ENCOUNTER — TELEPHONE (OUTPATIENT)
Dept: CARDIAC SURGERY | Facility: CLINIC | Age: 70
End: 2020-05-21

## 2020-05-26 ENCOUNTER — TELEPHONE (OUTPATIENT)
Dept: BARIATRICS | Facility: CLINIC | Age: 70
End: 2020-05-26

## 2020-05-26 ENCOUNTER — OFFICE VISIT (OUTPATIENT)
Dept: CARDIAC SURGERY | Facility: CLINIC | Age: 70
End: 2020-05-26
Payer: MEDICARE

## 2020-05-26 VITALS
SYSTOLIC BLOOD PRESSURE: 149 MMHG | OXYGEN SATURATION: 92 % | HEART RATE: 72 BPM | BODY MASS INDEX: 33.42 KG/M2 | WEIGHT: 181.6 LBS | TEMPERATURE: 98.1 F | DIASTOLIC BLOOD PRESSURE: 74 MMHG | HEIGHT: 62 IN

## 2020-05-26 DIAGNOSIS — Z98.890 HISTORY OF REPAIR OF HIATAL HERNIA: Primary | ICD-10-CM

## 2020-05-26 DIAGNOSIS — Z87.19 HISTORY OF REPAIR OF HIATAL HERNIA: Primary | ICD-10-CM

## 2020-05-26 PROCEDURE — 99205 OFFICE O/P NEW HI 60 MIN: CPT | Performed by: PHYSICIAN ASSISTANT

## 2020-05-27 ENCOUNTER — OFFICE VISIT (OUTPATIENT)
Dept: BARIATRICS | Facility: CLINIC | Age: 70
End: 2020-05-27
Payer: MEDICARE

## 2020-05-27 ENCOUNTER — TELEMEDICINE (OUTPATIENT)
Dept: BARIATRICS | Facility: CLINIC | Age: 70
End: 2020-05-27
Payer: MEDICARE

## 2020-05-27 VITALS
TEMPERATURE: 97.7 F | HEART RATE: 71 BPM | HEIGHT: 63 IN | DIASTOLIC BLOOD PRESSURE: 80 MMHG | WEIGHT: 179 LBS | SYSTOLIC BLOOD PRESSURE: 138 MMHG | BODY MASS INDEX: 31.71 KG/M2

## 2020-05-27 VITALS — BODY MASS INDEX: 32.57 KG/M2 | HEIGHT: 62 IN | WEIGHT: 177 LBS

## 2020-05-27 DIAGNOSIS — Z87.19 HISTORY OF REPAIR OF HIATAL HERNIA: ICD-10-CM

## 2020-05-27 DIAGNOSIS — Z98.890 HISTORY OF REPAIR OF HIATAL HERNIA: ICD-10-CM

## 2020-05-27 DIAGNOSIS — I10 ESSENTIAL HYPERTENSION: ICD-10-CM

## 2020-05-27 DIAGNOSIS — E11.21 DIABETIC NEPHROPATHY ASSOCIATED WITH TYPE 2 DIABETES MELLITUS (HCC): ICD-10-CM

## 2020-05-27 DIAGNOSIS — E66.9 CLASS 1 OBESITY IN ADULT: ICD-10-CM

## 2020-05-27 DIAGNOSIS — Z99.89 OSA ON CPAP: ICD-10-CM

## 2020-05-27 DIAGNOSIS — G47.33 OSA ON CPAP: ICD-10-CM

## 2020-05-27 DIAGNOSIS — R63.5 ABNORMAL WEIGHT GAIN: Primary | ICD-10-CM

## 2020-05-27 PROBLEM — M79.7 FIBROMYALGIA: Status: ACTIVE | Noted: 2020-05-27

## 2020-05-27 PROCEDURE — 99204 OFFICE O/P NEW MOD 45 MIN: CPT | Performed by: PHYSICIAN ASSISTANT

## 2020-05-27 PROCEDURE — NC001 PR NO CHARGE: Performed by: PHYSICIAN ASSISTANT

## 2020-05-28 ENCOUNTER — DOCUMENTATION (OUTPATIENT)
Dept: NEPHROLOGY | Facility: CLINIC | Age: 70
End: 2020-05-28

## 2020-05-28 DIAGNOSIS — I10 ESSENTIAL HYPERTENSION: Primary | ICD-10-CM

## 2020-05-28 DIAGNOSIS — N18.30 CHRONIC KIDNEY DISEASE, STAGE III (MODERATE) (HCC): ICD-10-CM

## 2020-05-28 RX ORDER — OLMESARTAN MEDOXOMIL 5 MG/1
5 TABLET ORAL DAILY
Qty: 30 TABLET | Refills: 5 | Status: SHIPPED | OUTPATIENT
Start: 2020-05-28 | End: 2020-06-09 | Stop reason: SDUPTHER

## 2020-06-01 DIAGNOSIS — N39.0 URINARY TRACT INFECTION WITHOUT HEMATURIA, SITE UNSPECIFIED: Primary | ICD-10-CM

## 2020-06-01 DIAGNOSIS — N18.30 CHRONIC KIDNEY DISEASE, STAGE III (MODERATE) (HCC): ICD-10-CM

## 2020-06-01 RX ORDER — AMOXICILLIN 500 MG/1
500 CAPSULE ORAL EVERY 12 HOURS SCHEDULED
Qty: 14 CAPSULE | Refills: 0 | Status: SHIPPED | OUTPATIENT
Start: 2020-06-01 | End: 2020-06-08

## 2020-06-04 ENCOUNTER — TELEMEDICINE (OUTPATIENT)
Dept: GASTROENTEROLOGY | Facility: AMBULARY SURGERY CENTER | Age: 70
End: 2020-06-04
Payer: MEDICARE

## 2020-06-04 DIAGNOSIS — K21.9 GERD (GASTROESOPHAGEAL REFLUX DISEASE): ICD-10-CM

## 2020-06-04 PROCEDURE — 99214 OFFICE O/P EST MOD 30 MIN: CPT | Performed by: INTERNAL MEDICINE

## 2020-06-04 RX ORDER — PANTOPRAZOLE SODIUM 40 MG/1
40 TABLET, DELAYED RELEASE ORAL DAILY
Qty: 30 TABLET | Refills: 3 | Status: SHIPPED | OUTPATIENT
Start: 2020-06-04 | End: 2020-07-27

## 2020-06-04 RX ORDER — FAMOTIDINE 40 MG/1
40 TABLET, FILM COATED ORAL DAILY
Qty: 30 TABLET | Refills: 3 | Status: SHIPPED | OUTPATIENT
Start: 2020-06-04 | End: 2020-07-27

## 2020-06-09 DIAGNOSIS — E11.21 DIABETIC NEPHROPATHY ASSOCIATED WITH TYPE 2 DIABETES MELLITUS (HCC): Primary | ICD-10-CM

## 2020-06-09 DIAGNOSIS — I10 ESSENTIAL HYPERTENSION: ICD-10-CM

## 2020-06-09 DIAGNOSIS — N18.30 CHRONIC KIDNEY DISEASE, STAGE III (MODERATE) (HCC): ICD-10-CM

## 2020-06-09 DIAGNOSIS — I12.9 HYPERTENSIVE CHRONIC KIDNEY DISEASE WITH STAGE 1 THROUGH STAGE 4 CHRONIC KIDNEY DISEASE, OR UNSPECIFIED CHRONIC KIDNEY DISEASE: ICD-10-CM

## 2020-06-10 RX ORDER — OLMESARTAN MEDOXOMIL 5 MG/1
10 TABLET ORAL DAILY
Qty: 180 TABLET | Refills: 3 | Status: SHIPPED | OUTPATIENT
Start: 2020-06-10 | End: 2020-08-26

## 2020-06-18 ENCOUNTER — TELEPHONE (OUTPATIENT)
Dept: GASTROENTEROLOGY | Facility: AMBULARY SURGERY CENTER | Age: 70
End: 2020-06-18

## 2020-06-18 NOTE — TELEPHONE ENCOUNTER
----- Message from [facility], MD sent at 6/4/2020 10:14 PM EDT -----  Please schedule pt for follow up in GI clinic in 3 mos       Thank you,  Sherry Harry

## 2020-06-18 NOTE — TELEPHONE ENCOUNTER
Patient mentioned she was told she needed to be scheduled for EGD in 2 months,  Forwarding for FYI , not sure if you received anything

## 2020-06-19 ENCOUNTER — APPOINTMENT (EMERGENCY)
Dept: CT IMAGING | Facility: HOSPITAL | Age: 70
End: 2020-06-19
Payer: MEDICARE

## 2020-06-19 ENCOUNTER — HOSPITAL ENCOUNTER (OUTPATIENT)
Facility: HOSPITAL | Age: 70
Setting detail: OBSERVATION
Discharge: HOME | End: 2020-06-19
Attending: EMERGENCY MEDICINE | Admitting: INTERNAL MEDICINE
Payer: MEDICARE

## 2020-06-19 ENCOUNTER — APPOINTMENT (EMERGENCY)
Dept: RADIOLOGY | Facility: HOSPITAL | Age: 70
End: 2020-06-19
Payer: MEDICARE

## 2020-06-19 VITALS
BODY MASS INDEX: 33.79 KG/M2 | RESPIRATION RATE: 16 BRPM | WEIGHT: 183.64 LBS | DIASTOLIC BLOOD PRESSURE: 74 MMHG | HEART RATE: 62 BPM | HEIGHT: 62 IN | TEMPERATURE: 98 F | OXYGEN SATURATION: 91 % | SYSTOLIC BLOOD PRESSURE: 165 MMHG

## 2020-06-19 DIAGNOSIS — R05.9 COUGH: Primary | ICD-10-CM

## 2020-06-19 DIAGNOSIS — R09.02 HYPOXIA: ICD-10-CM

## 2020-06-19 PROBLEM — R44.3 HALLUCINATIONS: Status: ACTIVE | Noted: 2020-06-19

## 2020-06-19 LAB
ALBUMIN SERPL BCP-MCNC: 3.5 G/DL (ref 3.5–5)
ALP SERPL-CCNC: 64 U/L (ref 46–116)
ALT SERPL W P-5'-P-CCNC: 36 U/L (ref 12–78)
AMMONIA PLAS-SCNC: <10 UMOL/L (ref 11–35)
AMPHETAMINES SERPL QL SCN: NEGATIVE
ANION GAP SERPL CALCULATED.3IONS-SCNC: 8 MMOL/L (ref 4–13)
APTT PPP: 26 SECONDS (ref 23–37)
ARTERIAL PATENCY WRIST A: NO
AST SERPL W P-5'-P-CCNC: 30 U/L (ref 5–45)
ATRIAL RATE: 59 BPM
BACTERIA UR QL AUTO: ABNORMAL /HPF
BARBITURATES UR QL: NEGATIVE
BASE EXCESS BLDA CALC-SCNC: 3.5 MMOL/L
BASOPHILS # BLD AUTO: 0.03 THOUSANDS/ΜL (ref 0–0.1)
BASOPHILS NFR BLD AUTO: 0 % (ref 0–1)
BENZODIAZ UR QL: NEGATIVE
BILIRUB SERPL-MCNC: 0.27 MG/DL (ref 0.2–1)
BILIRUB UR QL STRIP: NEGATIVE
BUN SERPL-MCNC: 26 MG/DL (ref 5–25)
CALCIUM SERPL-MCNC: 9.6 MG/DL (ref 8.3–10.1)
CHLORIDE SERPL-SCNC: 100 MMOL/L (ref 100–108)
CLARITY UR: CLEAR
CO2 SERPL-SCNC: 31 MMOL/L (ref 21–32)
COCAINE UR QL: NEGATIVE
COLOR UR: YELLOW
CREAT SERPL-MCNC: 1.61 MG/DL (ref 0.6–1.3)
D DIMER PPP FEU-MCNC: 0.57 UG/ML FEU
EOSINOPHIL # BLD AUTO: 0.36 THOUSAND/ΜL (ref 0–0.61)
EOSINOPHIL NFR BLD AUTO: 4 % (ref 0–6)
ERYTHROCYTE [DISTWIDTH] IN BLOOD BY AUTOMATED COUNT: 15.4 % (ref 11.6–15.1)
ETHANOL SERPL-MCNC: <3 MG/DL (ref 0–3)
GFR SERPL CREATININE-BSD FRML MDRD: 32 ML/MIN/1.73SQ M
GLUCOSE SERPL-MCNC: 182 MG/DL (ref 65–140)
GLUCOSE SERPL-MCNC: 187 MG/DL (ref 65–140)
GLUCOSE SERPL-MCNC: 211 MG/DL (ref 65–140)
GLUCOSE UR STRIP-MCNC: NEGATIVE MG/DL
HCO3 BLDA-SCNC: 29.1 MMOL/L (ref 22–28)
HCT VFR BLD AUTO: 38.2 % (ref 34.8–46.1)
HGB BLD-MCNC: 11.9 G/DL (ref 11.5–15.4)
HGB UR QL STRIP.AUTO: NEGATIVE
IMM GRANULOCYTES # BLD AUTO: 0.04 THOUSAND/UL (ref 0–0.2)
IMM GRANULOCYTES NFR BLD AUTO: 1 % (ref 0–2)
INR PPP: 0.96 (ref 0.84–1.19)
KETONES UR STRIP-MCNC: NEGATIVE MG/DL
LEUKOCYTE ESTERASE UR QL STRIP: ABNORMAL
LYMPHOCYTES # BLD AUTO: 2.05 THOUSANDS/ΜL (ref 0.6–4.47)
LYMPHOCYTES NFR BLD AUTO: 25 % (ref 14–44)
MCH RBC QN AUTO: 26.4 PG (ref 26.8–34.3)
MCHC RBC AUTO-ENTMCNC: 31.2 G/DL (ref 31.4–37.4)
MCV RBC AUTO: 85 FL (ref 82–98)
METHADONE UR QL: NEGATIVE
MONOCYTES # BLD AUTO: 0.65 THOUSAND/ΜL (ref 0.17–1.22)
MONOCYTES NFR BLD AUTO: 8 % (ref 4–12)
NEUTROPHILS # BLD AUTO: 5.19 THOUSANDS/ΜL (ref 1.85–7.62)
NEUTS SEG NFR BLD AUTO: 62 % (ref 43–75)
NITRITE UR QL STRIP: NEGATIVE
NON VENT ROOM AIR: 21 %
NON-SQ EPI CELLS URNS QL MICRO: ABNORMAL /HPF
NRBC BLD AUTO-RTO: 0 /100 WBCS
NT-PROBNP SERPL-MCNC: 392 PG/ML
O2 CT BLDA-SCNC: 15.5 ML/DL (ref 16–23)
OPIATES UR QL SCN: NEGATIVE
OXYHGB MFR BLDA: 90.2 % (ref 94–97)
P AXIS: 63 DEGREES
PCO2 BLDA: 48.5 MM HG (ref 36–44)
PCP UR QL: NEGATIVE
PH BLDA: 7.4 [PH] (ref 7.35–7.45)
PH UR STRIP.AUTO: 7.5 [PH]
PLATELET # BLD AUTO: 272 THOUSANDS/UL (ref 149–390)
PMV BLD AUTO: 11 FL (ref 8.9–12.7)
PO2 BLDA: 63.1 MM HG (ref 75–129)
POTASSIUM SERPL-SCNC: 4.2 MMOL/L (ref 3.5–5.3)
PR INTERVAL: 234 MS
PROT SERPL-MCNC: 7 G/DL (ref 6.4–8.2)
PROT UR STRIP-MCNC: ABNORMAL MG/DL
PROTHROMBIN TIME: 12.2 SECONDS (ref 11.6–14.5)
QRS AXIS: 1 DEGREES
QRSD INTERVAL: 84 MS
QT INTERVAL: 414 MS
QTC INTERVAL: 409 MS
RBC # BLD AUTO: 4.51 MILLION/UL (ref 3.81–5.12)
RBC #/AREA URNS AUTO: ABNORMAL /HPF
SODIUM SERPL-SCNC: 139 MMOL/L (ref 136–145)
SP GR UR STRIP.AUTO: 1.01 (ref 1–1.03)
SPECIMEN SOURCE: ABNORMAL
T WAVE AXIS: 56 DEGREES
THC UR QL: NEGATIVE
TROPONIN I SERPL-MCNC: <0.02 NG/ML
TSH SERPL DL<=0.05 MIU/L-ACNC: 0.75 UIU/ML (ref 0.36–3.74)
UROBILINOGEN UR QL STRIP.AUTO: 0.2 E.U./DL
VENTRICULAR RATE: 59 BPM
WBC # BLD AUTO: 8.32 THOUSAND/UL (ref 4.31–10.16)
WBC #/AREA URNS AUTO: ABNORMAL /HPF

## 2020-06-19 PROCEDURE — NC001 PR NO CHARGE: Performed by: INTERNAL MEDICINE

## 2020-06-19 PROCEDURE — 84443 ASSAY THYROID STIM HORMONE: CPT | Performed by: NURSE PRACTITIONER

## 2020-06-19 PROCEDURE — 84484 ASSAY OF TROPONIN QUANT: CPT | Performed by: EMERGENCY MEDICINE

## 2020-06-19 PROCEDURE — 94664 DEMO&/EVAL PT USE INHALER: CPT

## 2020-06-19 PROCEDURE — 93010 ELECTROCARDIOGRAM REPORT: CPT | Performed by: INTERNAL MEDICINE

## 2020-06-19 PROCEDURE — 36415 COLL VENOUS BLD VENIPUNCTURE: CPT | Performed by: EMERGENCY MEDICINE

## 2020-06-19 PROCEDURE — 85610 PROTHROMBIN TIME: CPT | Performed by: EMERGENCY MEDICINE

## 2020-06-19 PROCEDURE — 70450 CT HEAD/BRAIN W/O DYE: CPT

## 2020-06-19 PROCEDURE — 99285 EMERGENCY DEPT VISIT HI MDM: CPT

## 2020-06-19 PROCEDURE — 80307 DRUG TEST PRSMV CHEM ANLYZR: CPT | Performed by: NURSE PRACTITIONER

## 2020-06-19 PROCEDURE — 80320 DRUG SCREEN QUANTALCOHOLS: CPT | Performed by: NURSE PRACTITIONER

## 2020-06-19 PROCEDURE — 99285 EMERGENCY DEPT VISIT HI MDM: CPT | Performed by: EMERGENCY MEDICINE

## 2020-06-19 PROCEDURE — 85379 FIBRIN DEGRADATION QUANT: CPT | Performed by: EMERGENCY MEDICINE

## 2020-06-19 PROCEDURE — 93005 ELECTROCARDIOGRAM TRACING: CPT

## 2020-06-19 PROCEDURE — 99236 HOSP IP/OBS SAME DATE HI 85: CPT | Performed by: INTERNAL MEDICINE

## 2020-06-19 PROCEDURE — 82948 REAGENT STRIP/BLOOD GLUCOSE: CPT

## 2020-06-19 PROCEDURE — 71046 X-RAY EXAM CHEST 2 VIEWS: CPT

## 2020-06-19 PROCEDURE — 83880 ASSAY OF NATRIURETIC PEPTIDE: CPT | Performed by: EMERGENCY MEDICINE

## 2020-06-19 PROCEDURE — 36600 WITHDRAWAL OF ARTERIAL BLOOD: CPT

## 2020-06-19 PROCEDURE — 80053 COMPREHEN METABOLIC PANEL: CPT | Performed by: EMERGENCY MEDICINE

## 2020-06-19 PROCEDURE — 82805 BLOOD GASES W/O2 SATURATION: CPT | Performed by: NURSE PRACTITIONER

## 2020-06-19 PROCEDURE — 85025 COMPLETE CBC W/AUTO DIFF WBC: CPT | Performed by: EMERGENCY MEDICINE

## 2020-06-19 PROCEDURE — 85730 THROMBOPLASTIN TIME PARTIAL: CPT | Performed by: EMERGENCY MEDICINE

## 2020-06-19 PROCEDURE — 81001 URINALYSIS AUTO W/SCOPE: CPT | Performed by: EMERGENCY MEDICINE

## 2020-06-19 PROCEDURE — 82140 ASSAY OF AMMONIA: CPT | Performed by: NURSE PRACTITIONER

## 2020-06-19 RX ORDER — ALBUTEROL SULFATE 90 UG/1
2 AEROSOL, METERED RESPIRATORY (INHALATION) EVERY 6 HOURS PRN
Status: DISCONTINUED | OUTPATIENT
Start: 2020-06-19 | End: 2020-06-19 | Stop reason: HOSPADM

## 2020-06-19 RX ORDER — AMLODIPINE BESYLATE 5 MG/1
5 TABLET ORAL 2 TIMES DAILY
Status: DISCONTINUED | OUTPATIENT
Start: 2020-06-19 | End: 2020-06-19 | Stop reason: HOSPADM

## 2020-06-19 RX ORDER — METOPROLOL TARTRATE 50 MG/1
50 TABLET, FILM COATED ORAL EVERY 12 HOURS SCHEDULED
Status: DISCONTINUED | OUTPATIENT
Start: 2020-06-19 | End: 2020-06-19 | Stop reason: HOSPADM

## 2020-06-19 RX ORDER — ACETAMINOPHEN 325 MG/1
650 TABLET ORAL EVERY 6 HOURS PRN
Status: DISCONTINUED | OUTPATIENT
Start: 2020-06-19 | End: 2020-06-19 | Stop reason: HOSPADM

## 2020-06-19 RX ORDER — GUAIFENESIN 600 MG
600 TABLET, EXTENDED RELEASE 12 HR ORAL EVERY 12 HOURS SCHEDULED
Qty: 20 TABLET | Refills: 0 | Status: SHIPPED | OUTPATIENT
Start: 2020-06-19 | End: 2020-08-10

## 2020-06-19 RX ORDER — HEPARIN SODIUM 5000 [USP'U]/ML
5000 INJECTION, SOLUTION INTRAVENOUS; SUBCUTANEOUS EVERY 8 HOURS SCHEDULED
Status: DISCONTINUED | OUTPATIENT
Start: 2020-06-19 | End: 2020-06-19 | Stop reason: HOSPADM

## 2020-06-19 RX ORDER — FAMOTIDINE 20 MG/1
20 TABLET, FILM COATED ORAL DAILY
Status: DISCONTINUED | OUTPATIENT
Start: 2020-06-19 | End: 2020-06-19 | Stop reason: HOSPADM

## 2020-06-19 RX ORDER — GUAIFENESIN 600 MG
600 TABLET, EXTENDED RELEASE 12 HR ORAL EVERY 12 HOURS SCHEDULED
Status: DISCONTINUED | OUTPATIENT
Start: 2020-06-19 | End: 2020-06-19 | Stop reason: HOSPADM

## 2020-06-19 RX ORDER — ICOSAPENT ETHYL 1000 MG/1
1 CAPSULE ORAL 2 TIMES DAILY
Status: DISCONTINUED | OUTPATIENT
Start: 2020-06-19 | End: 2020-06-19 | Stop reason: HOSPADM

## 2020-06-19 RX ORDER — ALBUTEROL SULFATE 2.5 MG/3ML
2.5 SOLUTION RESPIRATORY (INHALATION) EVERY 6 HOURS PRN
Status: DISCONTINUED | OUTPATIENT
Start: 2020-06-19 | End: 2020-06-19 | Stop reason: HOSPADM

## 2020-06-19 RX ORDER — LEVOTHYROXINE SODIUM 175 UG/1
175 TABLET ORAL DAILY
Status: DISCONTINUED | OUTPATIENT
Start: 2020-06-19 | End: 2020-06-19 | Stop reason: HOSPADM

## 2020-06-19 RX ORDER — LOSARTAN POTASSIUM 25 MG/1
12.5 TABLET ORAL DAILY
Status: DISCONTINUED | OUTPATIENT
Start: 2020-06-19 | End: 2020-06-19 | Stop reason: HOSPADM

## 2020-06-19 RX ORDER — FLUTICASONE FUROATE AND VILANTEROL 200; 25 UG/1; UG/1
1 POWDER RESPIRATORY (INHALATION) DAILY
Status: DISCONTINUED | OUTPATIENT
Start: 2020-06-19 | End: 2020-06-19 | Stop reason: HOSPADM

## 2020-06-19 RX ORDER — CALCIUM CARBONATE 200(500)MG
1000 TABLET,CHEWABLE ORAL DAILY PRN
Status: DISCONTINUED | OUTPATIENT
Start: 2020-06-19 | End: 2020-06-19 | Stop reason: HOSPADM

## 2020-06-19 RX ORDER — PRAMIPEXOLE DIHYDROCHLORIDE 0.25 MG/1
0.25 TABLET ORAL DAILY
Status: DISCONTINUED | OUTPATIENT
Start: 2020-06-19 | End: 2020-06-19 | Stop reason: HOSPADM

## 2020-06-19 RX ORDER — CALCIUM CARBONATE 500(1250)
1 TABLET ORAL 2 TIMES DAILY WITH MEALS
Status: DISCONTINUED | OUTPATIENT
Start: 2020-06-19 | End: 2020-06-19 | Stop reason: HOSPADM

## 2020-06-19 RX ORDER — ALBUTEROL SULFATE 2.5 MG/3ML
5 SOLUTION RESPIRATORY (INHALATION) ONCE
Status: COMPLETED | OUTPATIENT
Start: 2020-06-19 | End: 2020-06-19

## 2020-06-19 RX ORDER — PANTOPRAZOLE SODIUM 40 MG/1
40 TABLET, DELAYED RELEASE ORAL DAILY
Status: DISCONTINUED | OUTPATIENT
Start: 2020-06-19 | End: 2020-06-19 | Stop reason: HOSPADM

## 2020-06-19 RX ORDER — TORSEMIDE 10 MG/1
10 TABLET ORAL DAILY
Status: DISCONTINUED | OUTPATIENT
Start: 2020-06-19 | End: 2020-06-19 | Stop reason: HOSPADM

## 2020-06-19 RX ADMIN — INSULIN LISPRO 1 UNITS: 100 INJECTION, SOLUTION INTRAVENOUS; SUBCUTANEOUS at 08:24

## 2020-06-19 RX ADMIN — INSULIN DETEMIR 22 UNITS: 100 INJECTION, SOLUTION SUBCUTANEOUS at 09:33

## 2020-06-19 RX ADMIN — HEPARIN SODIUM 5000 UNITS: 5000 INJECTION INTRAVENOUS; SUBCUTANEOUS at 09:31

## 2020-06-19 RX ADMIN — ZINC 1 TABLET: TAB ORAL at 09:33

## 2020-06-19 RX ADMIN — CALCIUM 1 TABLET: 500 TABLET ORAL at 09:35

## 2020-06-19 RX ADMIN — IPRATROPIUM BROMIDE 0.5 MG: 0.5 SOLUTION RESPIRATORY (INHALATION) at 04:31

## 2020-06-19 RX ADMIN — INSULIN LISPRO 8 UNITS: 100 INJECTION, SOLUTION INTRAVENOUS; SUBCUTANEOUS at 12:37

## 2020-06-19 RX ADMIN — DULOXETINE HYDROCHLORIDE 90 MG: 60 CAPSULE, DELAYED RELEASE ORAL at 09:31

## 2020-06-19 RX ADMIN — ALBUTEROL SULFATE 5 MG: 2.5 SOLUTION RESPIRATORY (INHALATION) at 04:31

## 2020-06-19 RX ADMIN — LEVOTHYROXINE SODIUM 175 MCG: 175 TABLET ORAL at 09:35

## 2020-06-19 RX ADMIN — INSULIN LISPRO 2 UNITS: 100 INJECTION, SOLUTION INTRAVENOUS; SUBCUTANEOUS at 12:37

## 2020-06-19 RX ADMIN — TORSEMIDE 10 MG: 10 TABLET ORAL at 09:35

## 2020-06-19 RX ADMIN — LOSARTAN POTASSIUM 12.5 MG: 25 TABLET, FILM COATED ORAL at 09:32

## 2020-06-19 RX ADMIN — METOPROLOL TARTRATE 50 MG: 50 TABLET, FILM COATED ORAL at 09:35

## 2020-06-19 RX ADMIN — MAGNESIUM OXIDE 400 MG: 400 TABLET ORAL at 09:35

## 2020-06-19 RX ADMIN — FAMOTIDINE 20 MG: 20 TABLET ORAL at 09:35

## 2020-06-19 RX ADMIN — PANTOPRAZOLE SODIUM 40 MG: 40 TABLET, DELAYED RELEASE ORAL at 09:34

## 2020-06-19 RX ADMIN — INSULIN LISPRO 8 UNITS: 100 INJECTION, SOLUTION INTRAVENOUS; SUBCUTANEOUS at 08:24

## 2020-07-01 ENCOUNTER — TELEPHONE (OUTPATIENT)
Dept: BARIATRICS | Facility: CLINIC | Age: 70
End: 2020-07-01

## 2020-07-12 NOTE — TELEPHONE ENCOUNTER
Called pt to schedule EGD with Dr Emre Hernandez  She stated her PCP wanted her to follow up with a different Dr through [facility]  She stated she would call back if wants to schedule

## 2020-07-13 ENCOUNTER — PREP FOR PROCEDURE (OUTPATIENT)
Dept: GASTROENTEROLOGY | Facility: AMBULARY SURGERY CENTER | Age: 70
End: 2020-07-13

## 2020-07-13 DIAGNOSIS — Z20.822 ENCOUNTER FOR LABORATORY TESTING FOR COVID-19 VIRUS: ICD-10-CM

## 2020-07-13 DIAGNOSIS — K21.9 GASTROESOPHAGEAL REFLUX DISEASE, ESOPHAGITIS PRESENCE NOT SPECIFIED: Primary | ICD-10-CM

## 2020-07-13 NOTE — TELEPHONE ENCOUNTER
Called pt, spoke with spouse, Nikki Rob and scheduled procedure on 8/10 with Dr Simone Cadet at [facility]  Pt aware to get covid testing done on 8/1  Reviewed prep/NPO after midnight, reminded needs a  and will get a call the Friday before with the arrival time  Pt is scheduled and covid testing ordered  Mailed prep, covid, and diabetic information to pt

## 2020-07-14 ENCOUNTER — HOSPITAL ENCOUNTER (OUTPATIENT)
Facility: HOSPITAL | Age: 70
Setting detail: OBSERVATION
Discharge: HOME | End: 2020-07-15
Attending: EMERGENCY MEDICINE | Admitting: INTERNAL MEDICINE
Payer: MEDICARE

## 2020-07-14 ENCOUNTER — APPOINTMENT (EMERGENCY)
Dept: RADIOLOGY | Facility: HOSPITAL | Age: 70
End: 2020-07-14
Payer: MEDICARE

## 2020-07-14 DIAGNOSIS — J96.01 ACUTE RESPIRATORY FAILURE WITH HYPOXIA (HCC): ICD-10-CM

## 2020-07-14 DIAGNOSIS — N18.30 CKD (CHRONIC KIDNEY DISEASE) STAGE 3, GFR 30-59 ML/MIN (HCC): ICD-10-CM

## 2020-07-14 DIAGNOSIS — R41.82 ALTERED MENTAL STATUS: ICD-10-CM

## 2020-07-14 DIAGNOSIS — I10 ESSENTIAL HYPERTENSION: ICD-10-CM

## 2020-07-14 DIAGNOSIS — I12.9 HYPERTENSIVE CHRONIC KIDNEY DISEASE WITH STAGE 1 THROUGH STAGE 4 CHRONIC KIDNEY DISEASE, OR UNSPECIFIED CHRONIC KIDNEY DISEASE: ICD-10-CM

## 2020-07-14 DIAGNOSIS — R05.9 COUGH: Primary | ICD-10-CM

## 2020-07-14 DIAGNOSIS — R09.02 HYPOXIA: ICD-10-CM

## 2020-07-14 DIAGNOSIS — G47.33 OSA ON CPAP: ICD-10-CM

## 2020-07-14 DIAGNOSIS — R06.02 SHORTNESS OF BREATH: ICD-10-CM

## 2020-07-14 DIAGNOSIS — I10 HIGH BLOOD PRESSURE: ICD-10-CM

## 2020-07-14 DIAGNOSIS — G25.81 RLS (RESTLESS LEGS SYNDROME): ICD-10-CM

## 2020-07-14 DIAGNOSIS — E11.22 TYPE 2 DIABETES MELLITUS WITH DIABETIC CHRONIC KIDNEY DISEASE (HCC): ICD-10-CM

## 2020-07-14 DIAGNOSIS — Z99.89 OSA ON CPAP: ICD-10-CM

## 2020-07-14 PROBLEM — K21.9 GERD (GASTROESOPHAGEAL REFLUX DISEASE): Status: ACTIVE | Noted: 2020-07-14

## 2020-07-14 PROBLEM — D72.829 LEUKOCYTOSIS: Status: ACTIVE | Noted: 2020-07-14

## 2020-07-14 PROBLEM — R65.10 SYSTEMIC INFLAMMATORY RESPONSE SYNDROME (SIRS) (HCC): Status: ACTIVE | Noted: 2020-07-14

## 2020-07-14 PROBLEM — E78.1 HYPERTRIGLYCERIDEMIA: Status: ACTIVE | Noted: 2020-07-14

## 2020-07-14 PROBLEM — I26.99 PULMONARY EMBOLUS (HCC): Status: ACTIVE | Noted: 2020-07-14

## 2020-07-14 LAB
ALBUMIN SERPL BCP-MCNC: 3.2 G/DL (ref 3.5–5)
ALP SERPL-CCNC: 74 U/L (ref 46–116)
ALT SERPL W P-5'-P-CCNC: 17 U/L (ref 12–78)
ANION GAP SERPL CALCULATED.3IONS-SCNC: 8 MMOL/L (ref 4–13)
AST SERPL W P-5'-P-CCNC: 39 U/L (ref 5–45)
BASOPHILS # BLD AUTO: 0.03 THOUSANDS/ΜL (ref 0–0.1)
BASOPHILS NFR BLD AUTO: 0 % (ref 0–1)
BILIRUB SERPL-MCNC: 0.38 MG/DL (ref 0.2–1)
BUN SERPL-MCNC: 23 MG/DL (ref 5–25)
CALCIUM SERPL-MCNC: 9.6 MG/DL (ref 8.3–10.1)
CHLORIDE SERPL-SCNC: 95 MMOL/L (ref 100–108)
CO2 SERPL-SCNC: 30 MMOL/L (ref 21–32)
CREAT SERPL-MCNC: 1.46 MG/DL (ref 0.6–1.3)
D DIMER PPP FEU-MCNC: 1.16 UG/ML FEU
EOSINOPHIL # BLD AUTO: 0.44 THOUSAND/ΜL (ref 0–0.61)
EOSINOPHIL NFR BLD AUTO: 4 % (ref 0–6)
ERYTHROCYTE [DISTWIDTH] IN BLOOD BY AUTOMATED COUNT: 17.2 % (ref 11.6–15.1)
GFR SERPL CREATININE-BSD FRML MDRD: 36 ML/MIN/1.73SQ M
GLUCOSE SERPL-MCNC: 216 MG/DL (ref 65–140)
GLUCOSE SERPL-MCNC: 265 MG/DL (ref 65–140)
HCT VFR BLD AUTO: 40.3 % (ref 34.8–46.1)
HGB BLD-MCNC: 12.6 G/DL (ref 11.5–15.4)
IMM GRANULOCYTES # BLD AUTO: 0.1 THOUSAND/UL (ref 0–0.2)
IMM GRANULOCYTES NFR BLD AUTO: 1 % (ref 0–2)
LACTATE SERPL-SCNC: 1.1 MMOL/L (ref 0.5–2)
LYMPHOCYTES # BLD AUTO: 2.1 THOUSANDS/ΜL (ref 0.6–4.47)
LYMPHOCYTES NFR BLD AUTO: 18 % (ref 14–44)
MCH RBC QN AUTO: 26.6 PG (ref 26.8–34.3)
MCHC RBC AUTO-ENTMCNC: 31.3 G/DL (ref 31.4–37.4)
MCV RBC AUTO: 85 FL (ref 82–98)
MONOCYTES # BLD AUTO: 0.68 THOUSAND/ΜL (ref 0.17–1.22)
MONOCYTES NFR BLD AUTO: 6 % (ref 4–12)
NEUTROPHILS # BLD AUTO: 8.15 THOUSANDS/ΜL (ref 1.85–7.62)
NEUTS SEG NFR BLD AUTO: 71 % (ref 43–75)
NRBC BLD AUTO-RTO: 0 /100 WBCS
NT-PROBNP SERPL-MCNC: 219 PG/ML
PLATELET # BLD AUTO: 430 THOUSANDS/UL (ref 149–390)
PMV BLD AUTO: 10.9 FL (ref 8.9–12.7)
POTASSIUM SERPL-SCNC: 4.3 MMOL/L (ref 3.5–5.3)
PROCALCITONIN SERPL-MCNC: 0.08 NG/ML
PROT SERPL-MCNC: 7.7 G/DL (ref 6.4–8.2)
RBC # BLD AUTO: 4.74 MILLION/UL (ref 3.81–5.12)
SARS-COV-2 RNA RESP QL NAA+PROBE: NEGATIVE
SODIUM SERPL-SCNC: 133 MMOL/L (ref 136–145)
TROPONIN I SERPL-MCNC: <0.02 NG/ML
WBC # BLD AUTO: 11.5 THOUSAND/UL (ref 4.31–10.16)

## 2020-07-14 PROCEDURE — 1124F ACP DISCUSS-NO DSCNMKR DOCD: CPT | Performed by: INTERNAL MEDICINE

## 2020-07-14 PROCEDURE — 99285 EMERGENCY DEPT VISIT HI MDM: CPT | Performed by: EMERGENCY MEDICINE

## 2020-07-14 PROCEDURE — 99220 PR INITIAL OBSERVATION CARE/DAY 70 MINUTES: CPT | Performed by: INTERNAL MEDICINE

## 2020-07-14 PROCEDURE — 85025 COMPLETE CBC W/AUTO DIFF WBC: CPT | Performed by: EMERGENCY MEDICINE

## 2020-07-14 PROCEDURE — 87635 SARS-COV-2 COVID-19 AMP PRB: CPT | Performed by: EMERGENCY MEDICINE

## 2020-07-14 PROCEDURE — 71045 X-RAY EXAM CHEST 1 VIEW: CPT

## 2020-07-14 PROCEDURE — 85379 FIBRIN DEGRADATION QUANT: CPT | Performed by: EMERGENCY MEDICINE

## 2020-07-14 PROCEDURE — 83880 ASSAY OF NATRIURETIC PEPTIDE: CPT | Performed by: EMERGENCY MEDICINE

## 2020-07-14 PROCEDURE — 36415 COLL VENOUS BLD VENIPUNCTURE: CPT | Performed by: EMERGENCY MEDICINE

## 2020-07-14 PROCEDURE — 80053 COMPREHEN METABOLIC PANEL: CPT | Performed by: EMERGENCY MEDICINE

## 2020-07-14 PROCEDURE — 84484 ASSAY OF TROPONIN QUANT: CPT | Performed by: EMERGENCY MEDICINE

## 2020-07-14 PROCEDURE — 82948 REAGENT STRIP/BLOOD GLUCOSE: CPT

## 2020-07-14 PROCEDURE — 84145 PROCALCITONIN (PCT): CPT | Performed by: EMERGENCY MEDICINE

## 2020-07-14 PROCEDURE — 87040 BLOOD CULTURE FOR BACTERIA: CPT | Performed by: EMERGENCY MEDICINE

## 2020-07-14 PROCEDURE — 93005 ELECTROCARDIOGRAM TRACING: CPT

## 2020-07-14 PROCEDURE — 99285 EMERGENCY DEPT VISIT HI MDM: CPT

## 2020-07-14 PROCEDURE — 83605 ASSAY OF LACTIC ACID: CPT | Performed by: EMERGENCY MEDICINE

## 2020-07-14 RX ORDER — FAMOTIDINE 20 MG/1
20 TABLET, FILM COATED ORAL DAILY
Status: DISCONTINUED | OUTPATIENT
Start: 2020-07-15 | End: 2020-07-15 | Stop reason: HOSPADM

## 2020-07-14 RX ORDER — FAMOTIDINE 20 MG/1
40 TABLET, FILM COATED ORAL DAILY
Status: DISCONTINUED | OUTPATIENT
Start: 2020-07-15 | End: 2020-07-14

## 2020-07-14 RX ORDER — FLUTICASONE FUROATE AND VILANTEROL 200; 25 UG/1; UG/1
1 POWDER RESPIRATORY (INHALATION) DAILY
Status: DISCONTINUED | OUTPATIENT
Start: 2020-07-15 | End: 2020-07-15 | Stop reason: HOSPADM

## 2020-07-14 RX ORDER — HEPARIN SODIUM 5000 [USP'U]/ML
5000 INJECTION, SOLUTION INTRAVENOUS; SUBCUTANEOUS EVERY 8 HOURS SCHEDULED
Status: DISCONTINUED | OUTPATIENT
Start: 2020-07-14 | End: 2020-07-15 | Stop reason: HOSPADM

## 2020-07-14 RX ORDER — AMLODIPINE BESYLATE 5 MG/1
5 TABLET ORAL 2 TIMES DAILY
Status: DISCONTINUED | OUTPATIENT
Start: 2020-07-14 | End: 2020-07-15 | Stop reason: HOSPADM

## 2020-07-14 RX ORDER — CALCIUM CARBONATE 500(1250)
1 TABLET ORAL
Status: DISCONTINUED | OUTPATIENT
Start: 2020-07-15 | End: 2020-07-15 | Stop reason: HOSPADM

## 2020-07-14 RX ORDER — TORSEMIDE 10 MG/1
10 TABLET ORAL DAILY
Status: DISCONTINUED | OUTPATIENT
Start: 2020-07-15 | End: 2020-07-15 | Stop reason: HOSPADM

## 2020-07-14 RX ORDER — GUAIFENESIN 600 MG
600 TABLET, EXTENDED RELEASE 12 HR ORAL EVERY 12 HOURS SCHEDULED
Status: DISCONTINUED | OUTPATIENT
Start: 2020-07-14 | End: 2020-07-15 | Stop reason: HOSPADM

## 2020-07-14 RX ORDER — PANTOPRAZOLE SODIUM 40 MG/1
40 TABLET, DELAYED RELEASE ORAL
Status: DISCONTINUED | OUTPATIENT
Start: 2020-07-15 | End: 2020-07-15 | Stop reason: HOSPADM

## 2020-07-14 RX ORDER — LOSARTAN POTASSIUM 25 MG/1
25 TABLET ORAL DAILY
Status: DISCONTINUED | OUTPATIENT
Start: 2020-07-15 | End: 2020-07-15 | Stop reason: HOSPADM

## 2020-07-14 RX ORDER — CALCIUM CARBONATE 200(500)MG
1000 TABLET,CHEWABLE ORAL DAILY PRN
Status: DISCONTINUED | OUTPATIENT
Start: 2020-07-14 | End: 2020-07-15 | Stop reason: HOSPADM

## 2020-07-14 RX ORDER — PRAMIPEXOLE DIHYDROCHLORIDE 0.25 MG/1
0.25 TABLET ORAL DAILY
Status: DISCONTINUED | OUTPATIENT
Start: 2020-07-15 | End: 2020-07-15 | Stop reason: HOSPADM

## 2020-07-14 RX ORDER — LEVOTHYROXINE SODIUM 175 UG/1
175 TABLET ORAL
Status: DISCONTINUED | OUTPATIENT
Start: 2020-07-15 | End: 2020-07-15 | Stop reason: HOSPADM

## 2020-07-14 RX ORDER — CHLORAL HYDRATE 500 MG
1000 CAPSULE ORAL 2 TIMES DAILY
Status: DISCONTINUED | OUTPATIENT
Start: 2020-07-14 | End: 2020-07-15 | Stop reason: HOSPADM

## 2020-07-14 RX ORDER — ICOSAPENT ETHYL 1000 MG/1
1 CAPSULE ORAL 2 TIMES DAILY
Status: DISCONTINUED | OUTPATIENT
Start: 2020-07-14 | End: 2020-07-14

## 2020-07-14 RX ORDER — METOPROLOL TARTRATE 50 MG/1
50 TABLET, FILM COATED ORAL EVERY 12 HOURS SCHEDULED
Status: DISCONTINUED | OUTPATIENT
Start: 2020-07-14 | End: 2020-07-15 | Stop reason: HOSPADM

## 2020-07-14 RX ORDER — ALBUTEROL SULFATE 90 UG/1
2 AEROSOL, METERED RESPIRATORY (INHALATION) EVERY 6 HOURS PRN
Status: DISCONTINUED | OUTPATIENT
Start: 2020-07-14 | End: 2020-07-15 | Stop reason: HOSPADM

## 2020-07-14 RX ADMIN — AMLODIPINE BESYLATE 5 MG: 5 TABLET ORAL at 21:51

## 2020-07-14 RX ADMIN — HEPARIN SODIUM 5000 UNITS: 5000 INJECTION INTRAVENOUS; SUBCUTANEOUS at 21:52

## 2020-07-14 RX ADMIN — GUAIFENESIN 600 MG: 600 TABLET, EXTENDED RELEASE ORAL at 21:51

## 2020-07-14 RX ADMIN — INSULIN LISPRO 2 UNITS: 100 INJECTION, SOLUTION INTRAVENOUS; SUBCUTANEOUS at 21:54

## 2020-07-14 RX ADMIN — Medication 1000 MG: at 21:55

## 2020-07-14 RX ADMIN — SODIUM CHLORIDE 1000 ML: 0.9 INJECTION, SOLUTION INTRAVENOUS at 16:39

## 2020-07-14 RX ADMIN — METOPROLOL TARTRATE 50 MG: 50 TABLET, FILM COATED ORAL at 21:51

## 2020-07-14 NOTE — ED PROVIDER NOTES
History  Chief Complaint   Patient presents with    Cough     was in [place] on vacation and aspirated while sleeping  pt was on ventilator for 7 days  was in the hospital for 17 days  has followed up with pcp last week  denies shortness of breath  has cough  per  pt was ambulating and he roxygen saturation dropped  [place]     68 y/o female presents today with cough and shortness of breath  States she was on vacation in [place] several weeks ago, had an episode of aspiration while sleeping  She was reportedly on the ventilator for 7 days and in the hospital for a total of 17 days  She returned home and saw her PCP last week without complaints   now thinks she is more short of breath with exertion and she has a cough  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'  History provided by:  Patient and spouse  Cough   Cough characteristics:  Harsh  Sputum characteristics:  Nondescript  Severity:  Unable to specify  Onset quality:  Unable to specify  Timing:  Unable to specify  Progression:  Worsening  Chronicity:  Recurrent  Smoker: former smoker  Context comment:  See HPI  Relieved by:  None tried  Worsened by:  Nothing  Ineffective treatments:  None tried  Associated symptoms: shortness of breath    Associated symptoms: no chest pain, no chills, no diaphoresis, no eye discharge, no fever, no headaches, no rash, no sinus congestion and no sore throat    Risk factors: recent infection        Prior to Admission Medications   Prescriptions Last Dose Informant Patient Reported? Taking?    B-D ULTRAFINE III SHORT PEN 31G X 8 MM MISC  Self Yes No   Sig: USE AS DIRECTED 4 TIMES PER DAY   Calcium Carbonate (CALCIUM 600 PO)  Self Yes No   Sig: Take 1 capsule by mouth 2 (two) times a day   DULoxetine (CYMBALTA) 60 mg delayed release capsule  Self Yes No   Sig: Take 90 mg by mouth daily    Icosapent Ethyl (Vascepa) 1 g CAPS   Yes No   Sig: Take 1 g by mouth 2 (two) times a day   Magnesium 500 MG CAPS  Self Yes No   Sig: Take 1 capsule by mouth 3 (three) times a day   Vitamin D, Ergocalciferol, 2000 units CAPS  Self Yes No   Sig: Take 2,000 Units by mouth daily    albuterol (Ventolin HFA) 90 mcg/act inhaler   No No   Sig: Inhale 2 puffs every 6 (six) hours as needed for wheezing or shortness of breath   amLODIPine (NORVASC) 5 mg tablet   No No   Sig: Take 1 tablet (5 mg total) by mouth 2 (two) times a day   b complex vitamins tablet  Self Yes No   Sig: Take 1 tablet by mouth daily    calcium carbonate (TUMS) 500 mg chewable tablet   No No   Sig: Chew 2 tablets (1,000 mg total) daily as needed for indigestion or heartburn   famotidine (PEPCID) 40 MG tablet   No No   Sig: Take 1 tablet (40 mg total) by mouth daily   fluticasone-vilanterol (BREO ELLIPTA) 200-25 MCG/INH inhaler   No No   Sig: Inhale 1 puff daily Rinse mouth after use     guaiFENesin (MUCINEX) 600 mg 12 hr tablet   No No   Sig: Take 1 tablet (600 mg total) by mouth every 12 (twelve) hours   insulin aspart (NovoLOG) 100 units/mL injection   Yes No   Sig: Inject under the skin 3 (three) times a day before meals   insulin detemir (LEVEMIR) 100 units/mL subcutaneous injection  Self No No   Sig: Inject 15 Units under the skin every morning   Patient taking differently: Inject 15 Units under the skin every morning    levothyroxine 175 mcg tablet  Self Yes No   Sig: Take 175 mcg by mouth daily   metoprolol tartrate (LOPRESSOR) 50 mg tablet  Self Yes No   Sig: Take 50 mg by mouth every 12 (twelve) hours     olmesartan (BENICAR) 5 mg tablet   No No   Sig: Take 2 tablets (10 mg total) by mouth daily   pantoprazole (PROTONIX) 40 mg tablet   No No   Sig: Take 1 tablet (40 mg total) by mouth daily   pramipexole (MIRAPEX) 0 25 mg tablet  Self Yes No   Sig: Take 0 25 mg by mouth daily   torsemide (DEMADEX) 10 mg tablet   No No   Sig: Take 1 tablet (10 mg total) by mouth daily      Facility-Administered Medications: None       Past Medical History:   Diagnosis Date    Anxiety     Arthritis     Aspiration into airway     Diabetes mellitus ([facility] [address] )     Family history of thyroid problem     Heart burn     Hyperplasia, parathyroid ([facility] [address] [facility]     Hypertension     Kidney problem     Obesity (BMI 30 0-34  9)     RLS (restless legs syndrome)     Seasonal allergies     Sleep apnea     uses CPAP    Swollen ankles        Past Surgical History:   Procedure Laterality Date    ARTHROSCOPY KNEE      BREAST BIOPSY      CHOLECYSTECTOMY      HIATAL HERNIA REPAIR      LIPOSUCTION      REDUCTION MAMMAPLASTY      SQUAMOUS CELL CARCINOMA EXCISION      TOE SURGERY      TONSILLECTOMY      TUBAL LIGATION         Family History   Problem Relation Age of Onset    Heart disease Mother     Heart disease Father     Lung cancer Father 79        Smoker     Cancer Father         brain    Hypertension Sister     Diabetes Sister     Hypertension Brother     Diabetes Brother     Cancer Brother         esophageal cancer    Dementia Brother     Stroke Brother      I have reviewed and agree with the history as documented  E-Cigarette/Vaping    E-Cigarette Use Never User      E-Cigarette/Vaping Substances    Nicotine No     THC No     CBD No     Flavoring No     Other No     Unknown No      Social History     Tobacco Use    Smoking status: Former Smoker     Packs/day: 2 00     Years: 10 00     Pack years: 20 00     Types: Cigarettes     Last attempt to quit:      Years since quittin 5    Smokeless tobacco: Never Used   Substance Use Topics    Alcohol use: Yes     Frequency: Monthly or less     Drinks per session: 1 or 2     Binge frequency: Never     Comment: rare    Drug use: No       Review of Systems   Constitutional: Negative for chills, diaphoresis, fatigue and fever  HENT: Negative for postnasal drip, sore throat and trouble swallowing  Eyes: Negative for discharge     Respiratory: Positive for cough and shortness of breath  Negative for chest tightness  Cardiovascular: Negative for chest pain and leg swelling  Gastrointestinal: Negative for abdominal pain  Genitourinary: Negative for dysuria  Musculoskeletal: Negative for back pain  Skin: Negative for rash  Allergic/Immunologic: Negative for immunocompromised state  Neurological: Negative for dizziness, light-headedness and headaches  Psychiatric/Behavioral: Positive for confusion (patient states no but  states yes  )  Physical Exam  Physical Exam   Constitutional: She is oriented to person, place, and time  She appears well-developed and well-nourished  She appears distressed (conversational dyspnea)  HENT:   Head: Normocephalic and atraumatic  Mouth/Throat: Uvula is midline, oropharynx is clear and moist and mucous membranes are normal  No tonsillar exudate  Eyes: Pupils are equal, round, and reactive to light  Neck: Normal range of motion  Neck supple  Cardiovascular: Normal rate and regular rhythm  Pulmonary/Chest:   Conversational dyspnea  Lung sounds decreased right >left  Abdominal: Soft  Bowel sounds are normal  There is no tenderness  There is no rebound and no guarding  Musculoskeletal: She exhibits no edema, tenderness or deformity  Neurological: She is alert and oriented to person, place, and time  Patient moving all extremities equally, no focal neuro deficits noted  Skin: Skin is warm and dry  Capillary refill takes less than 2 seconds  Psychiatric: She has a normal mood and affect  Nursing note and vitals reviewed        Vital Signs  ED Triage Vitals   Temperature Pulse Respirations Blood Pressure SpO2   07/14/20 1349 07/14/20 1346 07/14/20 1346 07/14/20 1346 07/14/20 1346   98 6 °F (37 °C) (!) 112 20 158/87 99 %      Temp Source Heart Rate Source Patient Position - Orthostatic VS BP Location FiO2 (%)   07/14/20 1346 07/14/20 1346 07/14/20 1642 07/14/20 1346 --   Oral Monitor Lying Left arm       Pain Score       --                  Vitals:    07/14/20 1346 07/14/20 1642 07/14/20 1645   BP: 158/87 165/83 165/83   Pulse: (!) 112 98 96   Patient Position - Orthostatic VS:  Lying          Visual Acuity      ED Medications  Medications   sodium chloride 0 9 % bolus 1,000 mL (1,000 mL Intravenous New Bag 7/14/20 1639)       Diagnostic Studies  Results Reviewed     Procedure Component Value Units Date/Time    Novel Coronavirus [facility] [[id number]]  (Normal) Collected:  07/14/20 1456    Lab Status:  Final result Specimen:  Nasopharyngeal Swab Updated:  07/14/20 1634     SARS-CoV-2 Negative    Narrative: The specimen collection materials, transport medium, and/or testing methodology utilized in the production of these test results have been proven to be reliable in a limited validation with an abbreviated program under the Emergency Utilization Authorization provided by the FDA  Testing reported as "Presumptive positive" will be confirmed with secondary testing with a reference laboratory to ensure result accuracy  Clinical caution and judgement should be used with the interpretation of these results with consideration of the clinical impression and other laboratory testing  Testing reported as "Positive" or "Negative" has been proven to be accurate according to standard laboratory validation requirements  All testing is performed with control materials showing appropriate reactivity at standard intervals  Lactic acid [[id number]]  (Normal) Collected:  07/14/20 1456    Lab Status:  Final result Specimen:  Blood from Arm, Right Updated:  07/14/20 1607     LACTIC ACID 1 1 mmol/L     Narrative:       Result may be elevated if tourniquet was used during collection      NT-BNP PRO [[id number]]  (Abnormal) Collected:  07/14/20 1456    Lab Status:  Final result Specimen:  Blood from Arm, Right Updated:  07/14/20 1604     NT-proBNP 219 pg/mL     Comprehensive metabolic panel [[id number]] (Abnormal) Collected:  07/14/20 1456    Lab Status:  Final result Specimen:  Blood from Arm, Right Updated:  07/14/20 1557     Sodium 133 mmol/L      Potassium 4 3 mmol/L      Chloride 95 mmol/L      CO2 30 mmol/L      ANION GAP 8 mmol/L      BUN 23 mg/dL      Creatinine 1 46 mg/dL      Glucose 216 mg/dL      Calcium 9 6 mg/dL      AST 39 U/L      ALT 17 U/L      Alkaline Phosphatase 74 U/L      Total Protein 7 7 g/dL      Albumin 3 2 g/dL      Total Bilirubin 0 38 mg/dL      eGFR 36 ml/min/1 73sq m     Narrative:       [facility] guidelines for Chronic Kidney Disease (CKD):     Stage 1 with normal or high GFR (GFR > 90 mL/min/1 73 square meters)    Stage 2 Mild CKD (GFR = 60-89 mL/min/1 73 square meters)    Stage 3A Moderate CKD (GFR = 45-59 mL/min/1 73 square meters)    Stage 3B Moderate CKD (GFR = 30-44 mL/min/1 73 square meters)    Stage 4 Severe CKD (GFR = 15-29 mL/min/1 73 square meters)    Stage 5 End Stage CKD (GFR <15 mL/min/1 73 square meters)  Note: GFR calculation is accurate only with a steady state creatinine    Troponin I [[id number]]  (Normal) Collected:  07/14/20 1456    Lab Status:  Final result Specimen:  Blood from Arm, Right Updated:  07/14/20 1554     Troponin I <0 02 ng/mL     D-Dimer [[id number]]  (Abnormal) Collected:  07/14/20 1456    Lab Status:  Final result Specimen:  Blood from Arm, Right Updated:  07/14/20 1552     D-Dimer, Quant 1 16 ug/ml FEU     Blood culture #2 [[id number]] Collected:  07/14/20 1534    Lab Status:   In process Specimen:  Blood from Arm, Left Updated:  07/14/20 1537    CBC and differential [[id number]]  (Abnormal) Collected:  07/14/20 1456    Lab Status:  Final result Specimen:  Blood from Arm, Right Updated:  07/14/20 1516     WBC 11 50 Thousand/uL      RBC 4 74 Million/uL      Hemoglobin 12 6 g/dL      Hematocrit 40 3 %      MCV 85 fL      MCH 26 6 pg      MCHC 31 3 g/dL      RDW 17 2 %      MPV 10 9 fL      Platelets 914 Thousands/uL nRBC 0 /100 WBCs      Neutrophils Relative 71 %      Immat GRANS % 1 %      Lymphocytes Relative 18 %      Monocytes Relative 6 %      Eosinophils Relative 4 %      Basophils Relative 0 %      Neutrophils Absolute 8 15 Thousands/µL      Immature Grans Absolute 0 10 Thousand/uL      Lymphocytes Absolute 2 10 Thousands/µL      Monocytes Absolute 0 68 Thousand/µL      Eosinophils Absolute 0 44 Thousand/µL      Basophils Absolute 0 03 Thousands/µL     Procalcitonin [[id number]] Collected:  07/14/20 1456    Lab Status: In process Specimen:  Blood from Arm, Right Updated:  07/14/20 1511    Blood culture #1 [[id number]] Collected:  07/14/20 1456    Lab Status: In process Specimen:  Blood from Arm, Right Updated:  07/14/20 1511                 XR chest portable   Final Result by Kavya Davis MD (07/14 1620)      No radiographic evidence of acute cardiopulmonary disease            Workstation performed: [id number]                    Procedures  Procedures         ED Course                                             MDM  Number of Diagnoses or Management Options  Cough: new and requires workup  Hypoxia: new and requires workup  Shortness of breath: new and requires workup  Diagnosis management comments: 4:53 PM  MDM: Patient presents to the Emergency Department and was diagnosed with acute shortness of breath and hypoxia with exertion  GFR of 36 precludes her getting a CTA to rule out PE today  Will try IV hydration and reassess overnight vs VQ scan  This is a new problem that will require additional planned work-up in a hospitalized setting  Clinical laboratory testing, radiology imaging, and medical testing (EKG) were ordered  I independently reviewed the radiologic imaging, EKG, and laboratory studies    This case is considered high risk secondary to the above listed disease process that poses a threat to bodily function that requires further diagnostic testing and management which may include the administration of parenteral controlled substances  Discussed with LIBRADO  We had a detailed discussion of the patient's condition and case,  including need for admission  Accepts to his/her service  Bed request/bridging orders placed  Amount and/or Complexity of Data Reviewed  Clinical lab tests: ordered and reviewed  Tests in the radiology section of CPT®: ordered and reviewed  Tests in the medicine section of CPT®: ordered and reviewed  Review and summarize past medical records: yes  Discuss the patient with other providers: yes  Independent visualization of images, tracings, or specimens: yes    Risk of Complications, Morbidity, and/or Mortality  Presenting problems: high  Diagnostic procedures: high  Management options: high    Patient Progress  Patient progress: stable        Disposition  Final diagnoses:   Cough   Shortness of breath   Hypoxia     Time reflects when diagnosis was documented in both MDM as applicable and the Disposition within this note     Time User Action Codes Description Comment    7/14/2020  4:52 PM Elsi Harper Add [R05] Cough     7/14/2020  4:52 PM Tangela Sexton Add [R06 02] Shortness of breath     7/14/2020  4:53 PM Elsi Harper Add [R09 02] Hypoxia       ED Disposition     ED Disposition Condition Date/Time Comment    Admit Stable Tue Jul 14, 2020  4:52 PM Case was discussed with LIBRADO and the patient's admission status was agreed to be Admission Status: observation status to the service of Dr Lefty Rosado   Follow-up Information    None         Patient's Medications   Discharge Prescriptions    No medications on file     No discharge procedures on file      PDMP Review     None          ED Provider  Electronically Signed by           Brayton Hodgkins, DO  07/14/20 2345

## 2020-07-15 ENCOUNTER — APPOINTMENT (OUTPATIENT)
Dept: NUCLEAR MEDICINE | Facility: HOSPITAL | Age: 70
End: 2020-07-15
Payer: MEDICARE

## 2020-07-15 VITALS
TEMPERATURE: 97.6 F | SYSTOLIC BLOOD PRESSURE: 135 MMHG | RESPIRATION RATE: 18 BRPM | HEART RATE: 79 BPM | OXYGEN SATURATION: 94 % | DIASTOLIC BLOOD PRESSURE: 70 MMHG

## 2020-07-15 PROBLEM — I10 HIGH BLOOD PRESSURE: Status: RESOLVED | Noted: 2019-11-13 | Resolved: 2020-07-15

## 2020-07-15 PROBLEM — R65.10 SYSTEMIC INFLAMMATORY RESPONSE SYNDROME (SIRS) (HCC): Status: RESOLVED | Noted: 2020-07-14 | Resolved: 2020-07-15

## 2020-07-15 PROBLEM — I26.99 PULMONARY EMBOLUS (HCC): Status: RESOLVED | Noted: 2020-07-14 | Resolved: 2020-07-15

## 2020-07-15 LAB
ANION GAP SERPL CALCULATED.3IONS-SCNC: 8 MMOL/L (ref 4–13)
BACTERIA UR QL AUTO: ABNORMAL /HPF
BASOPHILS # BLD AUTO: 0.04 THOUSANDS/ΜL (ref 0–0.1)
BASOPHILS NFR BLD AUTO: 0 % (ref 0–1)
BILIRUB UR QL STRIP: NEGATIVE
BUN SERPL-MCNC: 19 MG/DL (ref 5–25)
CALCIUM SERPL-MCNC: 9.5 MG/DL (ref 8.3–10.1)
CHLORIDE SERPL-SCNC: 100 MMOL/L (ref 100–108)
CLARITY UR: CLEAR
CO2 SERPL-SCNC: 30 MMOL/L (ref 21–32)
COLOR UR: ABNORMAL
CREAT SERPL-MCNC: 1.47 MG/DL (ref 0.6–1.3)
EOSINOPHIL # BLD AUTO: 0.55 THOUSAND/ΜL (ref 0–0.61)
EOSINOPHIL NFR BLD AUTO: 5 % (ref 0–6)
ERYTHROCYTE [DISTWIDTH] IN BLOOD BY AUTOMATED COUNT: 17 % (ref 11.6–15.1)
GFR SERPL CREATININE-BSD FRML MDRD: 36 ML/MIN/1.73SQ M
GLUCOSE SERPL-MCNC: 181 MG/DL (ref 65–140)
GLUCOSE SERPL-MCNC: 202 MG/DL (ref 65–140)
GLUCOSE SERPL-MCNC: 234 MG/DL (ref 65–140)
GLUCOSE SERPL-MCNC: 279 MG/DL (ref 65–140)
GLUCOSE UR STRIP-MCNC: NEGATIVE MG/DL
HCT VFR BLD AUTO: 36.9 % (ref 34.8–46.1)
HGB BLD-MCNC: 11.5 G/DL (ref 11.5–15.4)
HGB UR QL STRIP.AUTO: NEGATIVE
IMM GRANULOCYTES # BLD AUTO: 0.08 THOUSAND/UL (ref 0–0.2)
IMM GRANULOCYTES NFR BLD AUTO: 1 % (ref 0–2)
KETONES UR STRIP-MCNC: NEGATIVE MG/DL
LEUKOCYTE ESTERASE UR QL STRIP: NEGATIVE
LYMPHOCYTES # BLD AUTO: 2.19 THOUSANDS/ΜL (ref 0.6–4.47)
LYMPHOCYTES NFR BLD AUTO: 22 % (ref 14–44)
MCH RBC QN AUTO: 26.3 PG (ref 26.8–34.3)
MCHC RBC AUTO-ENTMCNC: 31.2 G/DL (ref 31.4–37.4)
MCV RBC AUTO: 84 FL (ref 82–98)
MONOCYTES # BLD AUTO: 0.54 THOUSAND/ΜL (ref 0.17–1.22)
MONOCYTES NFR BLD AUTO: 5 % (ref 4–12)
NEUTROPHILS # BLD AUTO: 6.8 THOUSANDS/ΜL (ref 1.85–7.62)
NEUTS SEG NFR BLD AUTO: 67 % (ref 43–75)
NITRITE UR QL STRIP: NEGATIVE
NON-SQ EPI CELLS URNS QL MICRO: ABNORMAL /HPF
NRBC BLD AUTO-RTO: 0 /100 WBCS
PH UR STRIP.AUTO: 7 [PH]
PLATELET # BLD AUTO: 357 THOUSANDS/UL (ref 149–390)
PMV BLD AUTO: 10.2 FL (ref 8.9–12.7)
POTASSIUM SERPL-SCNC: 4.2 MMOL/L (ref 3.5–5.3)
PROT UR STRIP-MCNC: ABNORMAL MG/DL
RBC # BLD AUTO: 4.37 MILLION/UL (ref 3.81–5.12)
RBC #/AREA URNS AUTO: ABNORMAL /HPF
SODIUM SERPL-SCNC: 138 MMOL/L (ref 136–145)
SP GR UR STRIP.AUTO: 1.01 (ref 1–1.03)
UROBILINOGEN UR QL STRIP.AUTO: 0.2 E.U./DL
WBC # BLD AUTO: 10.2 THOUSAND/UL (ref 4.31–10.16)
WBC #/AREA URNS AUTO: ABNORMAL /HPF

## 2020-07-15 PROCEDURE — 80048 BASIC METABOLIC PNL TOTAL CA: CPT | Performed by: INTERNAL MEDICINE

## 2020-07-15 PROCEDURE — 82948 REAGENT STRIP/BLOOD GLUCOSE: CPT

## 2020-07-15 PROCEDURE — A9540 TC99M MAA: HCPCS

## 2020-07-15 PROCEDURE — 81001 URINALYSIS AUTO W/SCOPE: CPT | Performed by: INTERNAL MEDICINE

## 2020-07-15 PROCEDURE — 99217 PR OBSERVATION CARE DISCHARGE MANAGEMENT: CPT | Performed by: INTERNAL MEDICINE

## 2020-07-15 PROCEDURE — 78580 LUNG PERFUSION IMAGING: CPT

## 2020-07-15 PROCEDURE — 85025 COMPLETE CBC W/AUTO DIFF WBC: CPT | Performed by: INTERNAL MEDICINE

## 2020-07-15 RX ORDER — ACETAMINOPHEN 325 MG/1
650 TABLET ORAL EVERY 6 HOURS PRN
Status: DISCONTINUED | OUTPATIENT
Start: 2020-07-15 | End: 2020-07-15 | Stop reason: HOSPADM

## 2020-07-15 RX ORDER — ONDANSETRON 2 MG/ML
4 INJECTION INTRAMUSCULAR; INTRAVENOUS EVERY 6 HOURS PRN
Status: DISCONTINUED | OUTPATIENT
Start: 2020-07-15 | End: 2020-07-15 | Stop reason: HOSPADM

## 2020-07-15 RX ADMIN — DULOXETINE HYDROCHLORIDE 90 MG: 60 CAPSULE, DELAYED RELEASE ORAL at 08:22

## 2020-07-15 RX ADMIN — LOSARTAN POTASSIUM 25 MG: 25 TABLET, FILM COATED ORAL at 08:23

## 2020-07-15 RX ADMIN — FLUTICASONE FUROATE AND VILANTEROL TRIFENATATE 1 PUFF: 200; 25 POWDER RESPIRATORY (INHALATION) at 08:24

## 2020-07-15 RX ADMIN — PANTOPRAZOLE SODIUM 40 MG: 40 TABLET, DELAYED RELEASE ORAL at 06:02

## 2020-07-15 RX ADMIN — Medication 1 TABLET: at 08:23

## 2020-07-15 RX ADMIN — FAMOTIDINE 20 MG: 20 TABLET, FILM COATED ORAL at 08:23

## 2020-07-15 RX ADMIN — INSULIN LISPRO 8 UNITS: 100 INJECTION, SOLUTION INTRAVENOUS; SUBCUTANEOUS at 14:05

## 2020-07-15 RX ADMIN — LEVOTHYROXINE SODIUM 175 MCG: 175 TABLET ORAL at 06:02

## 2020-07-15 RX ADMIN — HEPARIN SODIUM 5000 UNITS: 5000 INJECTION INTRAVENOUS; SUBCUTANEOUS at 14:07

## 2020-07-15 RX ADMIN — ACETAMINOPHEN 650 MG: 325 TABLET, FILM COATED ORAL at 08:22

## 2020-07-15 RX ADMIN — ONDANSETRON 4 MG: 2 INJECTION INTRAMUSCULAR; INTRAVENOUS at 12:26

## 2020-07-15 RX ADMIN — INSULIN LISPRO 4 UNITS: 100 INJECTION, SOLUTION INTRAVENOUS; SUBCUTANEOUS at 11:29

## 2020-07-15 RX ADMIN — METOPROLOL TARTRATE 50 MG: 50 TABLET, FILM COATED ORAL at 08:23

## 2020-07-15 RX ADMIN — PRAMIPEXOLE DIHYDROCHLORIDE 0.25 MG: 0.25 TABLET ORAL at 08:22

## 2020-07-15 RX ADMIN — HEPARIN SODIUM 5000 UNITS: 5000 INJECTION INTRAVENOUS; SUBCUTANEOUS at 06:02

## 2020-07-15 RX ADMIN — TORSEMIDE 10 MG: 10 TABLET ORAL at 08:23

## 2020-07-15 RX ADMIN — AMLODIPINE BESYLATE 5 MG: 5 TABLET ORAL at 08:22

## 2020-07-15 RX ADMIN — MAGNESIUM OXIDE 400 MG: 400 TABLET ORAL at 08:22

## 2020-07-15 RX ADMIN — GUAIFENESIN 600 MG: 600 TABLET, EXTENDED RELEASE ORAL at 08:23

## 2020-07-15 RX ADMIN — Medication 1000 MG: at 08:23

## 2020-07-15 RX ADMIN — INSULIN DETEMIR 22 UNITS: 100 INJECTION, SOLUTION SUBCUTANEOUS at 08:23

## 2020-07-15 NOTE — PROGRESS NOTES
Progress Note Magdaleno Lawler 1950, 69 y o  female MRN: [id number]    Unit/Bed#: S -01 Encounter: [id number]    Primary Care Provider: Angeli Naranjo DO   Date and time admitted to hospital: 7/14/2020  2:31 PM        Altered mental status  Assessment & Plan  Patient appears to be confused  She answers the majority of questions with " I don't know"  Has poor concentration and short term memory loss   says this is not her baseline  Patient is dyspneic, tachycardic, complains of pleuritic chest pain and has a productive cough  On examination bibasilar crackles were noted  CXR - no acute cardiopulmonary disease  Procalcitonin within normal limits  Elevated D-Dimer  Suprapubic tenderness on exam - denies urinary symptoms  White count 10 2 this morning  Head CT from 6/19 showed mild chronic small vessel ischemic changes    Plan  Follow up Blood cultures  Follow up V/Q scan; B/L lower extremity doppler if V/Q scan is negative  Follow up UA    GERD (gastroesophageal reflux disease)  Assessment & Plan  Patient has h/o GERD treated with Nissen fundoapplication and revision in 2015 with post operative complications requiring 12 days on a ventilator    Plan  Continue Protonix, famotidine with PRN TUMS    Type 2 diabetes mellitus with diabetic chronic kidney disease ([facility] [address] )  Assessment & Plan  Lab Results   Component Value Date    HGBA1C 7 1 03/06/2020    HGBA1C 7 1 (A) 03/06/2020       Recent Labs     07/14/20  2153   POCGLU 265*       Blood Sugar Average: Last 72 hrs:  (P) 265  Patient has T2DM with CKD 3  HbA1c 7 1  Blood glucose 234 on morning BMP  Patient is under Insulin treatment at home with Levemir and Novolog    Plan  Levemir 22u at AM and Humalog 8u TID with meals  Sliding Scale Insulin before meals and at bedtime      Pulmonary embolus [facility])  Assessment & Plan  Patient presented to the ED with SOB and tachycardia, pleuritic chest pain    D-dimer elevated to 1 16  CTA contraindicated due to h/o CKD III   Continue supplemental oxygen oxygen  SpO2 98% on 2L NC this morning    Plan:  VQ scan ordered   -if negative follow up with Doppler of LE  Heparin for VTE prophylaxis    CKD (chronic kidney disease) stage 3, GFR 30-59 ml/min ([facility])  Assessment & Plan  Patient has h/o of T2DM on insulin  HbA1c 7 1  Current Creatine level 1 46; Baseline is between 1 6 to 2 2   No current signs of volume overload    Plan  Monitor renal function  Avoid nephrotoxic meds        High blood pressure  Assessment & Plan  Hypertensive on admission  Follows nephrology outpatient for hypertension  Home meds: amlodipine 5 mg BID, metoprolol 50 mg BID, olmesartan 5 mg BID, torsemide 10 mg daily  Current BP: 169/78    Plan:   Continue home meds; Losartan 25 mg daily was given instead of Omelsartan 5 BID  Monitor BP    * Systemic inflammatory response syndrome (SIRS) ([facility])  Assessment & Plan  Leukocytosis to 11 5 with Tachycardia  To 112 on Admission  Patient was discharged 10 days ago for Aspiration Pneumonia   -Patient has GERD on protonix at home  Presented to ED with altered mental status, worsening SOB, pleuritic chest pain and productive cough of 1 tsp clear sputum  Denies h/o COPD, Asthma  Former smoker  Bibasilar crackles on auscultation  CXR revealed no acute cardiopulmonary disease  Procalcitonin within normal limits  WBC 10 2 on 7/15  Suprapubic tenderness on exam; otherwise denies urinary symptoms    Plan  Follow up blood cultures  Continue Oxygen as needed  Trend white counts  Q4 hour vital signs  Follow up UA          VTE Pharmacologic Prophylaxis:   Pharmacologic: Heparin  Mechanical VTE Prophylaxis in Place: No    Discussions with Specialists or Other Care Team Provider: None    Education and Discussions with Family / Patient:  Will speak with patients  and son today    Current Length of Stay: 0 day(s)    Current Patient Status: Observation     Discharge Plan / Estimated Discharge Date: Pending    Code Status: Level 3 - DNAR and DNI      Subjective:   No acute overnight events  Patient states she would like to go home  She says she was told she has been confused, but does not feel that she is confused  History was difficult to obtain, as she often stated that she did not know the answer to my questions  She states that her  takes care of her medications at home  She denies chest pain, shortness of breath, fevers, chills, nausea, vomiting, and diarrhea  Objective:     Vitals:   Temp (24hrs), Av 5 °F (36 9 °C), Min:98 3 °F (36 8 °C), Max:98 7 °F (37 1 °C)    Temp:  [98 3 °F (36 8 °C)-98 7 °F (37 1 °C)] 98 7 °F (37 1 °C)  HR:  [] 83  Resp:  [18-20] 18  BP: (158-176)/(72-87) 161/72  SpO2:  [95 %-100 %] 96 %  There is no height or weight on file to calculate BMI  Input and Output Summary (last 24 hours):     No intake or output data in the 24 hours ending 07/15/20 0758    Physical Exam:     Physical Exam   Constitutional: She appears well-developed and well-nourished  No distress  HENT:   Head: Normocephalic and atraumatic  Eyes: Pupils are equal, round, and reactive to light  Conjunctivae and EOM are normal    Neck: Normal range of motion  Neck supple  No JVD present  Cardiovascular: Normal rate, regular rhythm, normal heart sounds and intact distal pulses  No murmur heard  Pulmonary/Chest: Effort normal    Inspiratory crackles   Abdominal: Soft  Bowel sounds are normal  She exhibits no distension  Suprapubic tenderness on exam   Neurological: She is alert  Oriented to person and place, but not to time   Skin: Skin is warm and dry           Additional Data:     Labs:    Results from last 7 days   Lab Units 07/15/20  0349   WBC Thousand/uL 10 20*   HEMOGLOBIN g/dL 11 5   HEMATOCRIT % 36 9   PLATELETS Thousands/uL 357   NEUTROS PCT % 67   LYMPHS PCT % 22   MONOS PCT % 5   EOS PCT % 5     Results from last 7 days   Lab Units 07/15/20  0349 20  1456   POTASSIUM mmol/L 4 2 4 3   CHLORIDE mmol/L 100 95*   CO2 mmol/L 30 30   BUN mg/dL 19 23   CREATININE mg/dL 1 47* 1 46*   CALCIUM mg/dL 9 5 9 6   ALK PHOS U/L  --  74   ALT U/L  --  17   AST U/L  --  39           * I Have Reviewed All Lab Data Listed Above  * Additional Pertinent Lab Tests Reviewed: Debra 66 Admission Reviewed    Imaging:    Imaging Reports Reviewed Today Include: CXR  Imaging Personally Reviewed by Myself Includes:  CXR    Recent Cultures (last 7 days):     Results from last 7 days   Lab Units 07/14/20  1534 07/14/20  1456   BLOOD CULTURE  Received in Microbiology Lab  Culture in Progress  Received in Microbiology Lab  Culture in Progress         Last 24 Hours Medication List:     Current Facility-Administered Medications:  albuterol 2 puff Inhalation Q6H PRN Dafne Aquino MD   amLODIPine 5 mg Oral BID Dafne Aquino MD   calcium carbonate 1 tablet Oral Daily With Breakfast Dafne Aquino MD   calcium carbonate 1,000 mg Oral Daily PRN Dafne Aquino MD   DULoxetine 90 mg Oral Daily Dafne Aquino MD   famotidine 20 mg Oral Daily Maretta El, CRNP   fish oil 1,000 mg Oral BID Maretta ElRICARDONP   fluticasone-vilanterol 1 puff Inhalation Daily Dafne Aquino MD   guaiFENesin 600 mg Oral Q12H [address] Dafne Aquino MD   heparin (porcine) 5,000 Units Subcutaneous [facility] Dafne Aquino MD   insulin detemir 22 Units Subcutaneous QAM Dafne Aquino MD   insulin lispro 1-5 Units Subcutaneous HS Dafne Aquino MD   insulin lispro 1-6 Units Subcutaneous TID Latesha Schilling MD   insulin lispro 8 Units Subcutaneous TID With [address], MD   levothyroxine 175 mcg Oral Early Morning Dafne Aquino MD   losartan 25 mg Oral Daily Dafne Aquino MD   magnesium oxide 400 mg Oral Daily Dafne Aquino MD   metoprolol tartrate 50 mg Oral Q12H [facility] Heavenly Johnson MD   pantoprazole 40 mg Oral Daily Before Breakfast Heavenly Johnson MD   pramipexole 0 25 mg Oral Daily Heavenly Johnson MD   torsemide 10 mg Oral Daily Heavenly Johnson MD        Today, Patient Was Seen By: Monique Schwartz MD    ** Please Note: This note has been constructed using a voice recognition system   **

## 2020-07-15 NOTE — ASSESSMENT & PLAN NOTE
Patient has h/o of T2DM on insulin   HbA1c 7 1  Current Creatine level 1 46; Baseline is between 1 6 to 2 2   No current signs of volume overload    Plan  Monitor renal function  Avoid nephrotoxic meds

## 2020-07-15 NOTE — H&P
H&P- Ancel Dates 1950, 69 y o  female MRN: [id number]    Unit/Bed#: S -01 Encounter: [id number]    Primary Care Provider: Barber Reyes DO   Date and time admitted to hospital: 7/14/2020  2:31 PM        * Systemic inflammatory response syndrome (SIRS) (HCC)  Assessment & Plan  WBC slightly elevated (11 5) with Tachycardia (112) on Admission  Patient was discharged 10 days ago for Aspiration Pneumonia  Probably due to aspiration of gastric contents  Patient has GERD not relieved with Nissen fundoapplication  Presents today with Confusion, worsening SOB, pleuritic chest pain and productive cough of 1 tsp clear sputum  Mention h/o chills but no fever  Currently afebrile  Denies h/o COPD, Asthma  Former smoker  Bibasilar crackles were heard on auscultation  CXR revealed no acute cardiopulmonary disease  Blood cultures are pending, procalcitonin is pending  Rule out respiratory infection  Continue Oxygen  Continue monitoring CBC + Diff  Follow up Blood Cultures  Continue monitoring VS    Pulmonary embolus [facility])  Assessment & Plan  Patient presented to the ED with SOB and tachycardia, pleuritic chest pain  D-dimer noted to be increased  CTA contraindicated due to h/o CKD III  Continue oxygen  VQ scan, if negative follow up with Doppler of LE  Patient on Heparin for VTE prophylaxis    Altered mental status  Assessment & Plan  Patient appears to be confused  She answers the majority of questions with " I don't know"  Has poor concentration and short term memory loss   says that she is not like that at baseline  Patient is dyspneic, tachycardic, complains of pleuritic chest pain and has a productive cough  On examination bibasilar crackles were noted  Rule out respiratory infection  Follow up with Blood cultures, CBC + Diff in AM and procalcitonin    V/Q scan ordered to rule out PE    Hypertriglyceridemia  Assessment & Plan  Patient has history of Hypertriglyceridemia  Continue with Vascepa    Leukocytosis  Assessment & Plan  WBC slightly elevated at admission  Recently discharge from hospital due to Aspiration pnaeumonia  CXR: no acute cardiopulmonary disease  Pleuritic chest pain, productive cough, SOB  Currently afebrile but mention chills  Rule out respiratory infection  Follow with Blood culture, procalcitonin   Follow up with CBC + Diff in AM      GERD (gastroesophageal reflux disease)  Assessment & Plan  Patient has h/o GERD treated with Nissen fundoapplication  Most likely cause of the past aspiration event  Continue Protonix, famotidine with PRN TUMS    Type 2 diabetes mellitus with diabetic chronic kidney disease ([facility] [address] )  Assessment & Plan  Lab Results   Component Value Date    HGBA1C 7 1 03/06/2020    HGBA1C 7 1 (A) 03/06/2020       No results for input(s): POCGLU in the last 72 hours  Blood Sugar Average: Last 72 hrs:    Patient has T2DM with CKD 3  HbA1c 7 1  Blood glucose was noted to be 216  Patient is under Insulin treatment at home with Levemir and Novolog  Continue home insulin treatment with Levemir 22u at AM and add Humalog 8u TID with meals      CKD (chronic kidney disease) stage 3, GFR 30-59 ml/min [facility])  Assessment & Plan  Patient has h/o of T2DM on insulin  HbA1c 7 1  Current Creatine level 1 46, less than on previous measurements  Baseline is between 1 6 to 2 2   No current signs of volume overload  Monitor BMP        High blood pressure  Assessment & Plan  Patient has h/o HTN  Current /78  Baseline seems to be systolic >650  Currently on Torsemide, Metoprolol, Amlodipine and olmersartan at home  Continue home meds for HTN; Losartan 25 was given instead of Omelsartan 5 BID  Monitor BP    VTE Prophylaxis: Heparin  / reason for no mechanical VTE prophylaxis suspection of PE   Code Status: Level 3  POLST: POLST form is not discussed and not completed at this time      Anticipated Length of Stay:  Patient will be admitted on an Observation basis with an anticipated length of stay of  Less than 2 midnights  Justification for Hospital Stay: Evaluation AMS    Chief Complaint:   Altered Mental Status    History of Present Illness:    Yolanda Kingsley is a 69 y o  female who presents with altered mental status  She has h/o T2DM with CKD, CKD stage 3, HTN, HLD, GERD  Patient is confused and her  is the one answering the majority of the questions  She was brought here by him due to confusion  Patient was discharged from a hospital around 10 days ago due to aspiration pneumonia, where she received antibiotic treatment, most likely due to acid reflux while she was sleeping  She went through [employer] and is currently treated with Protonix and Pepcid with PRN TUMS but it is not enough to alleviate her symptoms  Since then patient has been with home oxygen  She now presents in addition to  the confusion with SOB, pleuritic chest pain and cough, productive of 1 tsp of clear sputum  Patient denied fever but mentions episode of chills  She also mentions dizziness and light headedness  Denied palpitations, sweating, swelling or pain in lower extremities; no hemoptysis, headaches or back pain  Review of Systems:    Review of Systems   Constitutional: Positive for activity change and chills  Negative for diaphoresis, fatigue, fever and unexpected weight change  HENT: Negative for congestion, ear discharge, hearing loss, postnasal drip, sinus pain, sore throat and trouble swallowing  Respiratory: Positive for cough, choking and shortness of breath  Cardiovascular: Positive for chest pain  Negative for palpitations  Gastrointestinal: Positive for abdominal pain  Negative for constipation, diarrhea, nausea and vomiting  Endocrine: Negative for cold intolerance, polydipsia, polyphagia and polyuria  Genitourinary: Negative for difficulty urinating, dysuria, flank pain and hematuria  Musculoskeletal: Positive for arthralgias  Skin: Negative for rash  Neurological: Positive for dizziness  Hematological: Negative  Psychiatric/Behavioral: Positive for confusion  Past Medical and Surgical History:     Past Medical History:   Diagnosis Date    Anxiety     Arthritis     Aspiration into airway     Diabetes mellitus ([facility] [address] )     Family history of thyroid problem     Heart burn     Hyperplasia, parathyroid ([facility] [address] )     Hypertension     Kidney problem     Obesity (BMI 30 0-34  9)     RLS (restless legs syndrome)     Seasonal allergies     Sleep apnea     uses CPAP    Swollen ankles        Past Surgical History:   Procedure Laterality Date    ARTHROSCOPY KNEE      BREAST BIOPSY      CHOLECYSTECTOMY      HIATAL HERNIA REPAIR      LIPOSUCTION      REDUCTION MAMMAPLASTY      SQUAMOUS CELL CARCINOMA EXCISION      TOE SURGERY      TONSILLECTOMY      TUBAL LIGATION         Meds/Allergies:    Prior to Admission medications    Medication Sig Start Date End Date Taking?  Authorizing Provider   albuterol (Ventolin HFA) 90 mcg/act inhaler Inhale 2 puffs every 6 (six) hours as needed for wheezing or shortness of breath 5/5/20   Rufina Peace PA-C   amLODIPine (NORVASC) 5 mg tablet Take 1 tablet (5 mg total) by mouth 2 (two) times a day 4/13/20   [facility]   b complex vitamins tablet Take 1 tablet by mouth daily     Historical Provider, MD CARREON ULTRAFINE III SHORT PEN 31G X 8 MM MISC USE AS DIRECTED 4 TIMES PER DAY 7/26/19   Historical Provider, MD   Calcium Carbonate (CALCIUM 600 PO) Take 1 capsule by mouth 2 (two) times a day    Historical Provider, MD   calcium carbonate (TUMS) 500 mg chewable tablet Chew 2 tablets (1,000 mg total) daily as needed for indigestion or heartburn 5/5/20   Rufina Peace PA-C   DULoxetine (CYMBALTA) 60 mg delayed release capsule Take 90 mg by mouth daily     Historical Provider, MD   famotidine (PEPCID) 40 MG tablet Take 1 tablet (40 mg total) by mouth daily 6/4/20   Celestina Sin MD fluticasone-vilanterol (BREO ELLIPTA) 200-25 MCG/INH inhaler Inhale 1 puff daily Rinse mouth after use  5/6/20   Rufina Peace PA-C   guaiFENesin (MUCINEX) 600 mg 12 hr tablet Take 1 tablet (600 mg total) by mouth every 12 (twelve) hours 6/19/20   Jass Aranda MD   Icosapent Ethyl (Vascepa) 1 g CAPS Take 1 g by mouth 2 (two) times a day    Historical Provider, MD   insulin aspart (NovoLOG) 100 units/mL injection Inject under the skin 3 (three) times a day before meals    Historical Provider, MD   insulin detemir (LEVEMIR) 100 units/mL subcutaneous injection Inject 15 Units under the skin every morning  Patient taking differently: Inject 15 Units under the skin every morning  5/5/20   Rufina Peace PA-C   levothyroxine 175 mcg tablet Take 175 mcg by mouth daily    Historical Provider, MD   Magnesium 500 MG CAPS Take 1 capsule by mouth 3 (three) times a day    Historical Provider, MD   metoprolol tartrate (LOPRESSOR) 50 mg tablet Take 50 mg by mouth every 12 (twelve) hours      Claus Hernandez MD   olmesartan (BENICAR) 5 mg tablet Take 2 tablets (10 mg total) by mouth daily 6/10/20   Claus Hernandez MD   pantoprazole (PROTONIX) 40 mg tablet Take 1 tablet (40 mg total) by mouth daily 6/4/20   Celestina Cavazos MD   pramipexole (MIRAPEX) 0 25 mg tablet Take 0 25 mg by mouth daily    Historical Provider, MD   torsemide (DEMADEX) 10 mg tablet Take 1 tablet (10 mg total) by mouth daily 5/7/20   Paul Matamoros PA-C   Vitamin D, Ergocalciferol, 2000 units CAPS Take 2,000 Units by mouth daily     Historical Provider, MD     I have reviewed home medications with patient personally  Allergies:    Allergies   Allergen Reactions    Pollen Extract     Ciprofloxacin Hives       Social History:     Marital Status: /Civil Union   Occupation: Retired  Patient Pre-hospital Living Situation: Independent at home  Patient Pre-hospital Level of Mobility: Ambulates  Patient Pre-hospital Diet Restrictions: Diabetic diet  Substance Use History:   Social History     Substance and Sexual Activity   Alcohol Use Not Currently    Frequency: Monthly or less    Drinks per session: 1 or 2    Binge frequency: Never    Comment: rare     Social History     Tobacco Use   Smoking Status Former Smoker    Packs/day: 2 00    Years: 10 00    Pack years: 20 00    Types: Cigarettes    Last attempt to quit: Mat Ortiz Years since quittin 5   Smokeless Tobacco Never Used     Social History     Substance and Sexual Activity   Drug Use No       Family History:    non-contributory    Physical Exam:     Vitals:   Blood Pressure: (!) 172/79 (20)  Pulse: 101 (20)  Temperature: 98 3 °F (36 8 °C) (20)  Temp Source: Oral (20)  Respirations: 18 (20)  SpO2: 98 % (20)    Physical Exam   Constitutional: She appears well-developed and well-nourished  No distress  HENT:   Head: Normocephalic and atraumatic  Nose: Nose normal    Eyes: Conjunctivae, EOM and lids are normal    Neck: Normal range of motion and full passive range of motion without pain  Neck supple  No JVD present  No muscular tenderness present  No neck rigidity  No erythema present  Cardiovascular: Normal rate, regular rhythm, S1 normal, S2 normal, normal heart sounds and normal pulses  PMI is displaced  No murmur heard  Pulmonary/Chest: Effort normal  No accessory muscle usage  No tachypnea  No respiratory distress  She has rales in the right lower field and the left lower field  Abdominal: Soft  Normal appearance and bowel sounds are normal  There is tenderness in the epigastric area and left lower quadrant  Neurological: She is alert  She has normal strength  GCS eye subscore is 4  GCS verbal subscore is 5  GCS motor subscore is 6  Skin: Skin is warm and dry  Bruising noted  No rash noted  She is not diaphoretic  No cyanosis  Nails show no clubbing     Psychiatric: Her speech is normal and behavior is normal  Her mood appears anxious  She exhibits abnormal recent memory  Additional Data:     Lab Results: I have personally reviewed pertinent reports  Results from last 7 days   Lab Units 07/14/20  1456   WBC Thousand/uL 11 50*   HEMOGLOBIN g/dL 12 6   HEMATOCRIT % 40 3   PLATELETS Thousands/uL 430*   NEUTROS PCT % 71   LYMPHS PCT % 18   MONOS PCT % 6   EOS PCT % 4     Results from last 7 days   Lab Units 07/14/20  1456   POTASSIUM mmol/L 4 3   CHLORIDE mmol/L 95*   CO2 mmol/L 30   BUN mg/dL 23   CREATININE mg/dL 1 46*   CALCIUM mg/dL 9 6   ALK PHOS U/L 74   ALT U/L 17   AST U/L 39           Imaging: I have personally reviewed pertinent reports  Xr Chest Portable    Result Date: 7/14/2020  Narrative: CHEST INDICATION:   cough  Patient has suspected COVID-19  COMPARISON:  Chest x-ray 6/19/2020 EXAM PERFORMED/VIEWS:  XR CHEST PORTABLE FINDINGS:  Surgical clips in the left lower chest Cardiomediastinal silhouette appears unremarkable  The lungs are clear  No pneumothorax or pleural effusion  Osseous structures appear within normal limits for patient age  Impression: No radiographic evidence of acute cardiopulmonary disease Workstation performed: [id number]     Xr Chest 2 Views    Result Date: 6/19/2020  Narrative: CHEST INDICATION:   sob  COMPARISON:  Chest x-ray 5/2/2020 EXAM PERFORMED/VIEWS:  XR CHEST PA & LATERAL FINDINGS: Cardiomediastinal silhouette appears unremarkable  The lungs are clear  No pneumothorax or pleural effusion  Osseous structures appear within normal limits for patient age  Impression: No acute cardiopulmonary disease  Workstation performed: [id number]     Ct Head Without Contrast    Result Date: 6/19/2020  Narrative: CT BRAIN - WITHOUT CONTRAST INDICATION:   hallucinations  COMPARISON:  CT brain dated April 6, 2019  TECHNIQUE:  CT examination of the brain was performed    In addition to axial images, coronal 2D reformatted images were created and submitted for interpretation  Radiation dose length product (DLP) for this visit:  924 mGy-cm   This examination, like all CT scans performed in the [facility], was performed utilizing techniques to minimize radiation dose exposure, including the use of iterative reconstruction and automated exposure control  IMAGE QUALITY:  Diagnostic  FINDINGS: PARENCHYMA:  No intracranial mass, mass effect or midline shift  No CT signs of acute infarction  No acute parenchymal hemorrhage  There are mild periventricular and subcortical foci of white matter T2 hyperintensity which is nonspecific and most likely related to chronic small vessel ischemic changes  VENTRICLES AND EXTRA-AXIAL SPACES:  Normal for the patient's age  VISUALIZED ORBITS AND PARANASAL SINUSES:  Unremarkable  CALVARIUM AND EXTRACRANIAL SOFT TISSUES:  Normal      Impression: No acute intracranial abnormality  Mild chronic small vessel ischemic changes  Workstation performed: [id number]       EKG, Pathology, and Other Studies Reviewed on Admission:   · EKG: None    Epic / Care Everywhere Records Reviewed: Yes     ** Please Note: This note has been constructed using a voice recognition system   **

## 2020-07-15 NOTE — ASSESSMENT & PLAN NOTE
WBC slightly elevated (11 5) with Tachycardia (112) on Admission  Patient was discharged 10 days ago for Aspiration Pneumonia  Probably due to aspiration of gastric contents  Patient has GERD not relieved with Nissen fundoapplication  Presents today with Confusion, worsening SOB, pleuritic chest pain and productive cough of 1 tsp clear sputum  Mention h/o chills but no fever  Currently afebrile  Denies h/o COPD, Asthma  Former smoker  Bibasilar crackles were heard on auscultation  CXR revealed no acute cardiopulmonary disease  Blood cultures are pending, procalcitonin is pending  Rule out respiratory infection    Continue Oxygen  Continue monitoring CBC + Diff  Follow up Blood Cultures  Continue monitoring VS Statement Selected

## 2020-07-15 NOTE — ASSESSMENT & PLAN NOTE
Patient appears to be confused  She answers the majority of questions with " I don't know"  Has poor concentration and short term memory loss   says this is not her baseline  Patient is dyspneic, tachycardic, complains of pleuritic chest pain and has a productive cough  On examination bibasilar crackles were noted    CXR - no acute cardiopulmonary disease  Procalcitonin within normal limits  Elevated D-Dimer  Suprapubic tenderness on exam - denies urinary symptoms  White count 10 2 this morning  Head CT from 6/19 showed mild chronic small vessel ischemic changes    Plan  Follow up Blood cultures  Follow up V/Q scan; B/L lower extremity doppler if V/Q scan is negative  Follow up UA

## 2020-07-15 NOTE — DISCHARGE SUMMARY
Discharge Summary - [facility] Internal Medicine    Patient Information: Guanakito Tovar 69 y o  female MRN: [id number]  Unit/Bed#: S -01 Encounter: [id number]    Discharging Physician / Practitioner: Vale Moody MD  PCP: Barber Reyes DO  Admission Date: 7/14/2020  Discharge Date: 07/15/20    Reason for Admission:  Confusion    Discharge Diagnoses: Active Problems:    SIRS  CKD (chronic kidney disease) stage 3, GFR 30-59 ml/min ([facility])    Type 2 diabetes mellitus with diabetic chronic kidney disease (HCC)    GERD (gastroesophageal reflux disease)    Altered mental status      Consultations During Hospital Stay:  · None    Procedures Performed:   · None    Significant findings:  · None  · Blood cultures done in the ED pending at the time of patient's discharge  No concern for acute infectious process at this time    Hospital Course:   Guanakito Tovar is a 69 y o  female patient who originally presented to the hospital on 7/14/2020 due to confusion at home  Patient recently had a protracted hospital course in [place] following aspiration pneumonia  She required 10 days of ICU care and was on ventilator support during this time  She recently returned to [place] and has been on 2 L supplemental O2 via nasal cannula at home  Spouse noted she had been more confused from her baseline since her discharge  Following presentation, workup which included chest x-ray, laboratory studies were all unremarkable for any acute infectious process  She had procalcitonin done which was negative  D-dimer was elevated prompting V/Q scan which showed low probability for pulmonary embolus  At the time of initial presentation, she met SIRS criteria with mild leukocytosis and tachycardia  Tachycardia resolved during her hospital course and leukocytosis improved without additional interventions  She remained in stable condition and was eager for discharge home  She was set up with VNA services and PT  On discharge  Confusion was felt likely related to recent protracted ICU stay requiring respiratory support  No additional inpatient needs were identified and the patient was cleared for discharge home on 7/15/20  Condition at Discharge: stable     Discharge Day Visit / Exam:     Subjective:  No new complaints or acute overnight events  Had some episodes of nausea this morning but no vomiting  Later in the afternoon, she was able to tolerate her brunch without nausea vomiting  Vitals: Blood Pressure: 135/70 (07/15/20 1552)  Pulse: 79 (07/15/20 1552)  Temperature: 97 6 °F (36 4 °C) (07/15/20 1552)  Temp Source: Oral (07/15/20 1552)  Respirations: 18 (07/15/20 1552)  SpO2: 94 % (07/15/20 1552)    General Appearance:    Alert, cooperative, no distress, appropriately responsive    Head:    Normocephalic, mucous membranes moist   Eyes:    Conjunctiva/corneas clear   Neck:   Supple   Lungs: Few right lower lobe rales, improved from day prior  No wheezes     Heart:    Regular rate and rhythm, S1 and S2    Abdomen:     Soft, non-tender, nondistended   Extremities:   No edema   Neurologic:  Alert, oriented to person and place, follows commands, moves all extremities, 5/5 muscle strength bilateral upper and lower extremities, speech is clear, no facial droop or asymmetry      Discharge instructions/Information to patient and family:   See after visit summary for information provided to patient and family  Provisions for Follow-Up Care:  See after visit summary for information related to follow-up care and any pertinent home health orders  Disposition: Home with VNA    Spouse at the bedside updated on care plan  He requests for discharge home today with VNA services in view of negative workup and to prevent further decline in patient's mental status from continued hospital stay  Discharge Statement:  I spent >30 minutes discharging the patient  This time was spent on the day of discharge   I had direct contact with the patient on the day of discharge  Greater than 50% of the total time was spent examining patient, answering all patient questions, arranging and discussing plan of care with patient as well as directly providing post-discharge instructions  Additional time then spent on discharge activities  Discharge Medications:  See after visit summary for reconciled discharge medications provided to patient and family  ** Please Note: Dragon 360 Dictation voice to text software may have been used in the creation of this document   **

## 2020-07-15 NOTE — ASSESSMENT & PLAN NOTE
Leukocytosis to 11 5 with Tachycardia  To 112 on Admission  Patient was discharged 10 days ago for Aspiration Pneumonia   -Patient has GERD on protonix at home  Presented to ED with altered mental status, worsening SOB, pleuritic chest pain and productive cough of 1 tsp clear sputum  Denies h/o COPD, Asthma  Former smoker  Bibasilar crackles on auscultation  CXR revealed no acute cardiopulmonary disease    Procalcitonin within normal limits  WBC 10 2 on 7/15  Suprapubic tenderness on exam; otherwise denies urinary symptoms    Plan  Follow up blood cultures  Continue Oxygen as needed  Trend white counts  Q4 hour vital signs  Follow up UA

## 2020-07-15 NOTE — ASSESSMENT & PLAN NOTE
Lab Results   Component Value Date    HGBA1C 7 1 03/06/2020    HGBA1C 7 1 (A) 03/06/2020       Recent Labs     07/14/20  2153   POCGLU 265*       Blood Sugar Average: Last 72 hrs:  (P) 265  Patient has T2DM with CKD 3   HbA1c 7 1  Blood glucose 234 on morning BMP  Patient is under Insulin treatment at home with Levemir and Novolog    Plan  Levemir 22u at AM and Humalog 8u TID with meals  Sliding Scale Insulin before meals and at bedtime

## 2020-07-15 NOTE — PLAN OF CARE
Problem: Potential for Falls  Goal: Patient will remain free of falls  Description  INTERVENTIONS:  - Assess patient frequently for physical needs  -  Identify cognitive and physical deficits and behaviors that affect risk of falls    -  [place] fall precautions as indicated by assessment   - Educate patient/family on patient safety including physical limitations  - Instruct patient to call for assistance with activity based on assessment  - Modify environment to reduce risk of injury  - Consider OT/PT consult to assist with strengthening/mobility  Outcome: Adequate for Discharge     Problem: RESPIRATORY - ADULT  Goal: Achieves optimal ventilation and oxygenation  Description  INTERVENTIONS:  - Assess for changes in respiratory status  - Assess for changes in mentation and behavior  - Position to facilitate oxygenation and minimize respiratory effort  - Oxygen administered by appropriate delivery if ordered  - Initiate smoking cessation education as indicated  - Encourage broncho-pulmonary hygiene including cough, deep breathe, Incentive Spirometry  - Assess the need for suctioning and aspirate as needed  - Assess and instruct to report SOB or any respiratory difficulty  - Respiratory Therapy support as indicated  Outcome: Adequate for Discharge     Problem: PAIN - ADULT  Goal: Verbalizes/displays adequate comfort level or baseline comfort level  Description  Interventions:  - Encourage patient to monitor pain and request assistance  - Assess pain using appropriate pain scale  - Administer analgesics based on type and severity of pain and evaluate response  - Implement non-pharmacological measures as appropriate and evaluate response  - Consider cultural and social influences on pain and pain management  - Notify physician/advanced practitioner if interventions unsuccessful or patient reports new pain  Outcome: Adequate for Discharge     Problem: DISCHARGE PLANNING  Goal: Discharge to home or other facility with appropriate resources  Description  INTERVENTIONS:  - Identify barriers to discharge w/patient and caregiver  - Arrange for needed discharge resources and transportation as appropriate  - Identify discharge learning needs (meds, wound care, etc )  - Arrange for interpretive services to assist at discharge as needed  - Refer to Case Management Department for coordinating discharge planning if the patient needs post-hospital services based on physician/advanced practitioner order or complex needs related to functional status, cognitive ability, or social support system  Outcome: Adequate for Discharge     Problem: Knowledge Deficit  Goal: Patient/family/caregiver demonstrates understanding of disease process, treatment plan, medications, and discharge instructions  Description  Complete learning assessment and assess knowledge base    Interventions:  - Provide teaching at level of understanding  - Provide teaching via preferred learning methods  Outcome: Adequate for Discharge

## 2020-07-15 NOTE — PLAN OF CARE
Problem: Potential for Falls  Goal: Patient will remain free of falls  Description  INTERVENTIONS:  - Assess patient frequently for physical needs  -  Identify cognitive and physical deficits and behaviors that affect risk of falls    -  [place] fall precautions as indicated by assessment   - Educate patient/family on patient safety including physical limitations  - Instruct patient to call for assistance with activity based on assessment  - Modify environment to reduce risk of injury  - Consider OT/PT consult to assist with strengthening/mobility  Outcome: Progressing     Problem: RESPIRATORY - ADULT  Goal: Achieves optimal ventilation and oxygenation  Description  INTERVENTIONS:  - Assess for changes in respiratory status  - Assess for changes in mentation and behavior  - Position to facilitate oxygenation and minimize respiratory effort  - Oxygen administered by appropriate delivery if ordered  - Initiate smoking cessation education as indicated  - Encourage broncho-pulmonary hygiene including cough, deep breathe, Incentive Spirometry  - Assess the need for suctioning and aspirate as needed  - Assess and instruct to report SOB or any respiratory difficulty  - Respiratory Therapy support as indicated  Outcome: Progressing     Problem: PAIN - ADULT  Goal: Verbalizes/displays adequate comfort level or baseline comfort level  Description  Interventions:  - Encourage patient to monitor pain and request assistance  - Assess pain using appropriate pain scale  - Administer analgesics based on type and severity of pain and evaluate response  - Implement non-pharmacological measures as appropriate and evaluate response  - Consider cultural and social influences on pain and pain management  - Notify physician/advanced practitioner if interventions unsuccessful or patient reports new pain  Outcome: Progressing     Problem: DISCHARGE PLANNING  Goal: Discharge to home or other facility with appropriate resources  Description  INTERVENTIONS:  - Identify barriers to discharge w/patient and caregiver  - Arrange for needed discharge resources and transportation as appropriate  - Identify discharge learning needs (meds, wound care, etc )  - Arrange for interpretive services to assist at discharge as needed  - Refer to Case Management Department for coordinating discharge planning if the patient needs post-hospital services based on physician/advanced practitioner order or complex needs related to functional status, cognitive ability, or social support system  Outcome: Progressing     Problem: Knowledge Deficit  Goal: Patient/family/caregiver demonstrates understanding of disease process, treatment plan, medications, and discharge instructions  Description  Complete learning assessment and assess knowledge base    Interventions:  - Provide teaching at level of understanding  - Provide teaching via preferred learning methods  Outcome: Progressing

## 2020-07-15 NOTE — ASSESSMENT & PLAN NOTE
Patient has h/o GERD treated with Nissen fundoapplication  Most likely cause of the past aspiration event  Continue Protonix, famotidine with PRN TUMS

## 2020-07-15 NOTE — SOCIAL WORK
Cm met with patient and  at bedside to discuss dcp  Patient and  request Sn and PT at home   states PCP's office attempted to do this for them but were unable to have patietn accepted by an agency  Patient's first choice is [facility]  Referral placed and patishikhan accepted  AVS updated  Dr Jaffe Resides aware to completed referral  Nurse jaswant and patietn aware

## 2020-07-15 NOTE — ASSESSMENT & PLAN NOTE
WBC slightly elevated at admission  Recently discharge from hospital due to Aspiration pnaeumonia  CXR: no acute cardiopulmonary disease  Pleuritic chest pain, productive cough, SOB   Currently afebrile but mention chills  Rule out respiratory infection  Follow with Blood culture, procalcitonin   Follow up with CBC + Diff in AM

## 2020-07-15 NOTE — ASSESSMENT & PLAN NOTE
Patient appears to be confused  She answers the majority of questions with " I don't know"  Has poor concentration and short term memory loss   says that she is not like that at baseline  Patient is dyspneic, tachycardic, complains of pleuritic chest pain and has a productive cough  On examination bibasilar crackles were noted  Rule out respiratory infection  Follow up with Blood cultures, CBC + Diff in AM and procalcitonin    V/Q scan ordered to rule out PE

## 2020-07-15 NOTE — ASSESSMENT & PLAN NOTE
Patient has h/o HTN  Current /78   Baseline seems to be systolic >900  Currently on Torsemide, Metoprolol, Amlodipine and olmersartan at home  Continue home meds for HTN; Losartan 25 was given instead of Omelsartan 5 BID  Monitor BP

## 2020-07-15 NOTE — ASSESSMENT & PLAN NOTE
Patient presented to the ED with SOB and tachycardia, pleuritic chest pain  D-dimer noted to be increased  CTA contraindicated due to h/o CKD III    Continue oxygen  VQ scan, if negative follow up with Doppler of LE  Patient on Heparin for VTE prophylaxis

## 2020-07-15 NOTE — ASSESSMENT & PLAN NOTE
Patient has h/o of T2DM on insulin  HbA1c 7 1  Current Creatine level 1 46, less than on previous measurements  Baseline is between 1 6 to 2 2   No current signs of volume overload     Monitor BMP

## 2020-07-15 NOTE — ASSESSMENT & PLAN NOTE
Patient has h/o GERD treated with Nissen fundoapplication and revision in 2015 with post operative complications requiring 12 days on a ventilator    Plan  Continue Protonix, famotidine with PRN TUMS

## 2020-07-15 NOTE — ASSESSMENT & PLAN NOTE
Hypertensive on admission  Follows nephrology outpatient for hypertension  Home meds: amlodipine 5 mg BID, metoprolol 50 mg BID, olmesartan 5 mg BID, torsemide 10 mg daily  Current BP: 169/78    Plan:   Continue home meds;  Losartan 25 mg daily was given instead of Omelsartan 5 BID  Monitor BP

## 2020-07-15 NOTE — ASSESSMENT & PLAN NOTE
Lab Results   Component Value Date    HGBA1C 7 1 03/06/2020    HGBA1C 7 1 (A) 03/06/2020       No results for input(s): POCGLU in the last 72 hours  Blood Sugar Average: Last 72 hrs:    Patient has T2DM with CKD 3   HbA1c 7 1  Blood glucose was noted to be 216  Patient is under Insulin treatment at home with Levemir and Novolog  Continue home insulin treatment with Levemir 22u at AM and add Humalog 8u TID with meals

## 2020-07-15 NOTE — DISCHARGE INSTR - AVS FIRST PAGE
Dear Carri Moya,     It was our pleasure to care for you here at [facility]  It is our hope that we were always able to exceed the expected standards for your care during your stay  You were hospitalized due to confusion  You were cared for on the [facility] 2nd floor under the service of Tahmina Kaur MD with the [facility] who covers for your primary care physician (PCP), Alexa Retana DO, while you were hospitalized  If you have any questions or concerns related to this hospitalization, you may contact us at [phone]  For follow up as well as medication refills, we recommend that you follow up with your primary care physician  A registered nurse will reach out to you by phone within a few days after your discharge to answer any additional questions that you may have after going home  However, at this time we provide for you here, the most important instructions / recommendations at discharge:       · Notable Medication Adjustments -   · None    · Testing Required after Discharge -   · None    · Important follow up information -   · Follow-up with your primary care physician in 1 week    · Other Instructions -   · Continue supplemental O2    · Please review this entire after visit summary as additional general instructions including medication list, appointments, activity, diet, any pertinent wound care, and other additional recommendations from your care team that may be provided for you        Sincerely,     Tahmina Kaur MD

## 2020-07-15 NOTE — ASSESSMENT & PLAN NOTE
Patient presented to the ED with SOB and tachycardia, pleuritic chest pain  D-dimer elevated to 1 16  CTA contraindicated due to h/o CKD III    Continue supplemental oxygen oxygen  SpO2 98% on 2L NC this morning    Plan:  VQ scan ordered   -if negative follow up with Doppler of LE  Heparin for VTE prophylaxis

## 2020-07-15 NOTE — UTILIZATION REVIEW
Initial Clinical Review    Admission: Date/Time/Statement: Admission Orders (From admission, onward)     Ordered        07/14/20 1659  Place in Observation (expected length of stay for this patient is less than two midnights)  Once                Orders Placed This Encounter   Procedures    Place in Observation (expected length of stay for this patient is less than two midnights)     Standing Status:   Standing     Number of Occurrences:   1     Order Specific Question:   Admitting Physician     Answer:   Serene Patiño [[id number]]     Order Specific Question:   Level of Care     Answer:   Med Surg [[id number]]     ED Arrival Information     Expected Arrival Acuity Means of Arrival Escorted By Service Admission Type    - 7/14/2020 13:27 Urgent Wheelchair Spouse General Medicine Urgent    Arrival Complaint    Altered Mental Status/Low 02 Sat   (pt traveled to [facility] 12 days ago)     Chief Complaint   Patient presents with    Cough     was in [place] on vacation and aspirated while sleeping  pt was on ventilator for 7 days  was in the hospital for 17 days  has followed up with pcp last week  denies shortness of breath  has cough  per  pt was ambulating and he roxygen saturation dropped  [place]     History of Present Illness:   Erlinda Santiago is a 69 y o  female who presents with altered mental status  She has h/o T2DM with CKD, CKD stage 3, HTN, HLD, GERD  Patient is confused and her  is the one answering the majority of the questions  She was brought here by him due to confusion  Patient was discharged from a hospital around 10 days ago due to aspiration pneumonia, where she received antibiotic treatment, most likely due to acid reflux while she was sleeping  She went through [employer] and is currently treated with Protonix and Pepcid with PRN TUMS but it is not enough to alleviate her symptoms  Since then patient has been with home oxygen   She now presents in addition to  the confusion with SOB, pleuritic chest pain and cough, productive of 1 tsp of clear sputum  Patient denied fever but mentions episode of chills  She also mentions dizziness and light headedness      Review of Systems:  Constitutional: Positive for activity change and chills  Respiratory: Positive for cough, choking and shortness of breath  Cardiovascular: Positive for chest pain  Gastrointestinal: Positive for abdominal pain  Musculoskeletal: Positive for arthralgias  Neurological: Positive for dizziness  Psychiatric/Behavioral: Positive for confusion  Assessment/Plan:   Systemic inflammatory response syndrome (SIRS) (HCC)  Assessment & Plan  WBC slightly elevated (11 5) with Tachycardia (112) on Admission  Patient was discharged 10 days ago for Aspiration Pneumonia  Probably due to aspiration of gastric contents  Patient has GERD not relieved with Nissen fundoapplication  Presents today with Confusion, worsening SOB, pleuritic chest pain and productive cough of 1 tsp clear sputum  Mention h/o chills but no fever  Currently afebrile  Bibasilar crackles were heard on auscultation  CXR revealed no acute cardiopulmonary disease  Blood cultures are pending, procalcitonin is pending  Rule out respiratory infection  Continue Oxygen  Continue monitoring CBC + Diff  Follow up Blood Cultures  Continue monitoring VS     Pulmonary embolus [facility])  Assessment & Plan  Patient presented to the ED with SOB and tachycardia, pleuritic chest pain  D-dimer noted to be increased  CTA contraindicated due to h/o CKD III  Continue oxygen  VQ scan, if negative follow up with Doppler of LE  Patient on Heparin for VTE prophylaxis     Altered mental status  Assessment & Plan  Patient appears to be confused  She answers the majority of questions with " I don't know"    Has poor concentration and short term memory loss   says that she is not like that at baseline  Patient is dyspneic, tachycardic, complains of pleuritic chest pain and has a productive cough  On examination bibasilar crackles were noted  Rule out respiratory infection  Follow up with Blood cultures, CBC + Diff in AM and procalcitonin  V/Q scan ordered to rule out PE     Leukocytosis  Assessment & Plan  WBC slightly elevated at admission  Recently discharge from hospital due to Aspiration pnaeumonia  CXR: no acute cardiopulmonary disease  Pleuritic chest pain, productive cough, SOB  Currently afebrile but mention chills  Rule out respiratory infection  Follow with Blood culture, procalcitonin   Follow up with CBC + Diff in AM     VTE Prophylaxis: Heparin  / reason for no mechanical VTE prophylaxis suspection of PE     Anticipated Length of Stay:  Patient will be admitted on an Observation basis with an anticipated length of stay of  Less than 2 midnights  Justification for Hospital Stay: Evaluation AMS    ED Triage Vitals   Temperature Pulse Respirations Blood Pressure SpO2   20 1349 20 1346 20 1346 20 1346 20 1346   98 6 °F (37 °C) (!) 112 20 158/87 99 %      Temp Source Heart Rate Source Patient Position - Orthostatic VS BP Location FiO2 (%)   20 1346 20 1346 20 1642 20 1346 --   Oral Monitor Lying Left arm       Pain Score       20 1953       No Pain        Wt Readings from Last 1 Encounters:   20 83 3 kg (183 lb 10 3 oz)     Additional Vital Signs:   Temp (24hrs), Av 5 °F (36 9 °C), Min:98 3 °F (36 8 °C), Max:98 7 °F (37 1 °C)   Temp:  [98 3 °F (36 8 °C)-98 7 °F (37 1 °C)] 98 7 °F (37 1 °C)  HR:  [] 83  Resp:  [18-20] 18  BP: (158-176)/(72-87) 161/72  SpO2:  [95 %-100 %] 96 %    I/O    07/15 0701   0700   P  O  120   Total Intake 120   Urine 450   Total Output 450   Net -330       Unmeasured Urine Occurrence 1     Pertinent Labs/Diagnostic Test Results:   Results from last 7 days   Lab Units 20  1456   SARS-COV-2  Negative     Results from last 7 days   Lab Units 07/15/20  0349 07/14/20  1456   WBC Thousand/uL 10 20* 11 50*   HEMOGLOBIN g/dL 11 5 12 6   HEMATOCRIT % 36 9 40 3   PLATELETS Thousands/uL 357 430*   NEUTROS ABS Thousands/µL 6 80 8  15*     Results from last 7 days   Lab Units 07/15/20  0349 07/14/20  1456   SODIUM mmol/L 138 133*   POTASSIUM mmol/L 4 2 4 3   CHLORIDE mmol/L 100 95*   CO2 mmol/L 30 30   ANION GAP mmol/L 8 8   BUN mg/dL 19 23   CREATININE mg/dL 1 47* 1 46*   EGFR ml/min/1 73sq m 36 36   CALCIUM mg/dL 9 5 9 6     Results from last 7 days   Lab Units 07/14/20  1456   AST U/L 39   ALT U/L 17   ALK PHOS U/L 74   TOTAL PROTEIN g/dL 7 7   ALBUMIN g/dL 3 2*   TOTAL BILIRUBIN mg/dL 0 38     Results from last 7 days   Lab Units 07/15/20  1107 07/15/20  0820 07/14/20  2153   POC GLUCOSE mg/dl 279* 202* 265*     Results from last 7 days   Lab Units 07/15/20  0349 07/14/20  1456   GLUCOSE RANDOM mg/dL 234* 216*         Results from last 7 days   Lab Units 07/14/20  1021   HEMOGLOBIN A1C % 7 5*   EAG mg/dL 169*     Results from last 7 days   Lab Units 07/14/20  1456   TROPONIN I ng/mL <0 02     Results from last 7 days   Lab Units 07/14/20  1456   D-DIMER QUANTITATIVE ug/ml FEU 1 16*     Results from last 7 days   Lab Units 07/14/20  1456   PROCALCITONIN ng/ml 0 08     Results from last 7 days   Lab Units 07/14/20  1456   LACTIC ACID mmol/L 1 1     Results from last 7 days   Lab Units 07/14/20  1456   NT-PRO BNP pg/mL 219*     Results from last 7 days   Lab Units 07/14/20  1534 07/14/20  1456   BLOOD CULTURE  Received in Microbiology Lab  Culture in Progress  Received in Microbiology Lab  Culture in Progress  CHEST X RAY -  No radiographic evidence of acute cardiopulmonary disease    LUNG PERFUSION SCAN -  The probability for pulmonary embolus is low      ED Treatment:   Medication Administration from 07/14/2020 1326 to 07/14/2020 1931       Date/Time Order Dose Route Action     07/14/2020 1639 sodium chloride 0 9 % bolus 1,000 mL 1,000 mL Intravenous New Bag     Past Medical History:   Diagnosis Date    Anxiety     Arthritis     Aspiration into airway     Diabetes mellitus ([facility] [address] [facility]     Family history of thyroid problem     Heart burn     [facility], [facility]     Hypertension     Kidney problem     Obesity (BMI 30 0-34  9)     RLS (restless legs syndrome)     Seasonal allergies     Sleep apnea     uses CPAP    Swollen ankles      Present on Admission:   Systemic inflammatory response syndrome (SIRS) ([facility])   CKD (chronic kidney disease) stage 3, GFR 30-59 ml/min ([facility])   Type 2 diabetes mellitus with diabetic chronic kidney disease ([facility])   GERD (gastroesophageal reflux disease)   Altered mental status    Admitting Diagnosis: Shortness of breath [R06 02]  Cough [R05]  Altered mental status [R41 82]  Hypoxia [R09 02]    Age/Sex: 69 y o  female    Admission Orders:  VS q4hrs  Nasal O2 at 2 L/min Titrate per SpO2   Continuous Pulse Oximetry  Diet Derrek/CHO Controlled; Consistent Carbohydrate Diet Level 2 (5 carb servings/75 grams CHO/meal)    Scheduled Medications:  amLODIPine 5 mg Oral BID   calcium carbonate 1 tablet Oral Daily With Breakfast   DULoxetine 90 mg Oral Daily   famotidine 20 mg Oral Daily   fish oil 1,000 mg Oral BID   fluticasone-vilanterol 1 puff Inhalation Daily   guaiFENesin 600 mg Oral Q12H [address]   heparin (porcine) 5,000 Units Subcutaneous Q8H [address]   insulin detemir 22 Units Subcutaneous QAM   insulin lispro 1-5 Units Subcutaneous HS   insulin lispro 1-6 Units Subcutaneous TID AC   insulin lispro 8 Units Subcutaneous TID With Meals   levothyroxine 175 mcg Oral Early Morning   losartan 25 mg Oral Daily   magnesium oxide 400 mg Oral Daily   metoprolol tartrate 50 mg Oral Q12H RICHARD   pantoprazole 40 mg Oral Daily Before Breakfast   pramipexole 0 25 mg Oral Daily   torsemide 10 mg Oral Daily      PRN Meds:  acetaminophen 650 mg Oral Q6H PRN GIVEN X 1   albuterol 2 puff Inhalation Q6H PRN   calcium carbonate 1,000 mg Oral Daily PRN   ondansetron 4 mg Intravenous Q6H PRN  GIVEN X 1     Network Utilization Review Department  [email or web address]  ATTENTION: Please call with any questions or concerns to [phone] and carefully listen to the prompts so that you are directed to the right person  All voicemails are confidential   Sudie Crigler all requests for admission clinical reviews, approved or denied determinations and any other requests to dedicated fax number below belonging to the campus where the patient is receiving treatment   List of dedicated fax numbers for the Facilities:  [address] DENIALS (Administrative/Medical Necessity) [phone]   [address] (Maternity/NICU/Pediatrics) [phone]   Giovanni Min [phone]   Stephanie Sheets [phone]   Deedee Juniper [phone]   German Nagy [phone]   [address] [place] [address] [phone]   [facility]  [phone]   [address] [place] [address], [address]  [facility] [address] [facility] [phone]

## 2020-07-16 LAB
ATRIAL RATE: 95 BPM
P AXIS: 51 DEGREES
PR INTERVAL: 192 MS
QRS AXIS: -10 DEGREES
QRSD INTERVAL: 80 MS
QT INTERVAL: 340 MS
QTC INTERVAL: 427 MS
T WAVE AXIS: 55 DEGREES
VENTRICULAR RATE: 95 BPM

## 2020-07-16 PROCEDURE — 93010 ELECTROCARDIOGRAM REPORT: CPT | Performed by: INTERNAL MEDICINE

## 2020-07-19 LAB
BACTERIA BLD CULT: NORMAL
BACTERIA BLD CULT: NORMAL

## 2020-07-24 ENCOUNTER — TELEPHONE (OUTPATIENT)
Dept: PULMONOLOGY | Facility: CLINIC | Age: 70
End: 2020-07-24

## 2020-07-24 NOTE — TELEPHONE ENCOUNTER
COVID Pre-Visit Screening     1  Is this a family member screening? /NO  2  Have you traveled outside of your state in the past 2 weeks? {Yes 15 [facility] 7/7  3  Do you presently have a fever or flu-like symptoms? /NO  4  Do you have symptoms of an upper respiratory infection like runny nose, sore throat, or cough? /NO  5  Are you suffering from new headache that you have not had in the past?  {/NO  6  Do you have/have you experienced any new shortness of breath recently? /NO  7  Do you have any new diarrhea, nausea or vomiting? /NO  8  Have you been in contact with anyone who has been sick or diagnosed with COVID-19? /NO  9  Do you have any new loss of taste or smell? {/NO  10  Are you able to wear a mask without a valve for the entire visit?  {YES

## 2020-07-27 ENCOUNTER — OFFICE VISIT (OUTPATIENT)
Dept: PULMONOLOGY | Facility: CLINIC | Age: 70
End: 2020-07-27
Payer: MEDICARE

## 2020-07-27 VITALS
HEART RATE: 76 BPM | WEIGHT: 163 LBS | SYSTOLIC BLOOD PRESSURE: 160 MMHG | DIASTOLIC BLOOD PRESSURE: 80 MMHG | HEIGHT: 62 IN | BODY MASS INDEX: 30 KG/M2 | TEMPERATURE: 98.2 F | OXYGEN SATURATION: 91 %

## 2020-07-27 DIAGNOSIS — G47.33 OSA ON CPAP: ICD-10-CM

## 2020-07-27 DIAGNOSIS — T17.908S ASPIRATION INTO RESPIRATORY TRACT, SEQUELA: Primary | ICD-10-CM

## 2020-07-27 DIAGNOSIS — Z99.89 OSA ON CPAP: ICD-10-CM

## 2020-07-27 DIAGNOSIS — I27.20 PULMONARY HYPERTENSION (HCC): ICD-10-CM

## 2020-07-27 DIAGNOSIS — K21.9 GERD (GASTROESOPHAGEAL REFLUX DISEASE): ICD-10-CM

## 2020-07-27 DIAGNOSIS — J96.01 ACUTE RESPIRATORY FAILURE WITH HYPOXIA (HCC): ICD-10-CM

## 2020-07-27 PROCEDURE — 99214 OFFICE O/P EST MOD 30 MIN: CPT | Performed by: INTERNAL MEDICINE

## 2020-07-27 RX ORDER — PANTOPRAZOLE SODIUM 40 MG/1
TABLET, DELAYED RELEASE ORAL
Qty: 30 TABLET | Refills: 3 | Status: SHIPPED | OUTPATIENT
Start: 2020-07-27 | End: 2020-10-26

## 2020-07-27 RX ORDER — GABAPENTIN 300 MG/1
300 CAPSULE ORAL EVERY EVENING
COMMUNITY
Start: 2020-06-17 | End: 2021-12-22

## 2020-07-27 RX ORDER — FLUTICASONE PROPIONATE 50 MCG
1-2 SPRAY, SUSPENSION (ML) NASAL DAILY
COMMUNITY
Start: 2020-07-01 | End: 2022-01-13 | Stop reason: SDUPTHER

## 2020-07-27 RX ORDER — FAMOTIDINE 40 MG/1
TABLET, FILM COATED ORAL
Qty: 30 TABLET | Refills: 3 | Status: SHIPPED | OUTPATIENT
Start: 2020-07-27 | End: 2020-09-24

## 2020-07-27 RX ORDER — CEFDINIR 300 MG/1
300 CAPSULE ORAL EVERY 12 HOURS
COMMUNITY
Start: 2020-06-04 | End: 2020-08-03

## 2020-07-27 NOTE — LETTER
July 29, 2020     [place] [address] [place] [address] [place]     Patient: Bairon Todd   YOB: 1950   Date of Visit: 7/27/2020       Dear Dr Florencia Croft:    Thank you for referring Bairon Todd to me for evaluation  Below are my notes for this consultation  If you have questions, please do not hesitate to call me  I look forward to following your patient along with you  Sincerely,        Stacie Reddy DO        CC: No Recipients  Stacie Reddy DO  7/29/2020  7:13 AM  Sign at close encounter  Progress note - Pulmonary Medicine   Bairon Todd 69 y o  female MRN: [id number]       Impression & Plan:   70 y o  F with PMHx of DM, HTN, Hyperlipidemia and hiatal hernia s/p repair who comes in for follow up with recent respiratory failure requiring mechanical ventilation due to aspiration, BARBARA s/p UPPP on CPAP     1   Chronic hypoxic respiratory failure on 1L of oxygen with exertion - this is secondary to prolonged hospitalization requiring mechanical secondary to aspiration pneumonia       -  Continue 1L of oxygen with ambulation       -  Obtain records from hospital in [address] [place] [address] to follow with speech therapy  Aspiration precautions  -  Will have her get repeat imaging to assess for ILD        -  Repeat PFTs after this event to ensure there is no worsening          -  Consider increasing diuretics to see if hypoxia will improve  2   Mild BARBARA (AHI - 13 1) now on autoPAP 9-20  Current compliance is skewed due to hospitalization    She does note clinical benefit from the CPAP      -  continue autoPAP to 9-20 cc of H2O pressure to help get rid of a few more events         -  Follow compliance at next visit       -  She is aware of the risk of leaving sleep apnea untreated including hypertension, heart failure, arrhythmia, MI and stroke      3   RLS and significant neuropathy -  she states that this this is stable          -  Continue Neurontin 300mg PO at dinner and 600mg at bedtime          -  Continue Mirapex 0 25 qHS          -  Also on methacarbamol as well     4   Mild pulmonary hypertension/RV hypertrophy on echo -  repeat echo does not demonstrate these changes  Would still consider a diuretic     ______________________________________________________________________    HPI:    Jocelyne Way presents today for follow-up  Unfortunately she went to [place] at the end of June and became very ill  She had an episodes where she had vomiting medication and cause large volume aspiration  She was admitted to ICU there and had a prolonged hospitalization for approximately 17 days, 7 of them were on a ventilator  She was treated with antibiotics and slowly improved clinically  She then finally returned back to [place] in early July  Since that time she has been at home recovering from her illness  She did require oxygen when she was discharged from the hospital   She states she has been monitoring her saturation and will note that she will get desaturation with ambulation  She also notes a productive cough and chest congestion  She denies fever, chills or night sweats  As far as her sleep goes, she is having difficulty getting back into her routine  She is using her CPAP but is not as consistent as she was previously  She has noted that if she uses nasal steroids that will improve her nasal congestion  She also states that her restless legs have been stable as well  Review of Systems:  Review of Systems   Constitutional: Positive for fatigue  Negative for appetite change and unexpected weight change  HENT: Negative  Eyes: Negative  Respiratory: Positive for cough, chest tightness, shortness of breath and wheezing  Cardiovascular: Negative  Gastrointestinal: Negative  Endocrine: Negative  Genitourinary: Negative  Musculoskeletal: Negative  Skin: Negative  Allergic/Immunologic: Negative  Neurological: Negative  Hematological: Negative  Psychiatric/Behavioral: Negative            Social history updates:  Social History     Tobacco Use   Smoking Status Former Smoker    Packs/day: 2 00    Years: 10 00    Pack years: 20 00    Types: Cigarettes    Last attempt to quit:     Years since quittin 6   Smokeless Tobacco Never Used     Social History     Socioeconomic History    Marital status: /Civil Union     Spouse name: Not on file    Number of children: Not on file    Years of education: Not on file    Highest education level: Not on file   Occupational History    Not on file   Social Needs    Financial resource strain: Not on file    Food insecurity:     Worry: Not on file     Inability: Not on file    Transportation needs:     Medical: Not on file     Non-medical: Not on file   Tobacco Use    Smoking status: Former Smoker     Packs/day: 2 00     Years: 10 00     Pack years: 20 00     Types: Cigarettes     Last attempt to quit:      Years since quittin 6    Smokeless tobacco: Never Used   Substance and Sexual Activity    Alcohol use: Not Currently     Frequency: Monthly or less     Drinks per session: 1 or 2     Binge frequency: Never     Comment: rare    Drug use: No    Sexual activity: Not on file   Lifestyle    Physical activity:     Days per week: Not on file     Minutes per session: Not on file    Stress: Not on file   Relationships    Social connections:     Talks on phone: Not on file     Gets together: Not on file     Attends Taoist service: Not on file     Active member of club or organization: Not on file     Attends meetings of clubs or organizations: Not on file     Relationship status: Not on file    Intimate partner violence:     Fear of current or ex partner: Not on file     Emotionally abused: Not on file     Physically abused: Not on file     Forced sexual activity: Not on file   Other Topics Concern    Not on file   Social History Narrative    Not on file PhysicalExamination:  Vitals:   /80 (BP Location: Right arm, Patient Position: Sitting)   Pulse 76   Temp 98 2 °F (36 8 °C)   Ht 5' 2" (1 575 m)   Wt 73 9 kg (163 lb)   SpO2 91%   BMI 29 81 kg/m²    General: Pleasant, Awake alert and oriented x 3, conversant without conversational dyspnea, NAD, normal affect  HEENT:  PERRL, Sclera noninjected, nonicteric OU, Nares patent,  no craniofacial abnormalities, Mucous membranes, moist, no oral lesions, normal dentition  NECK: Trachea midline, no accessory muscle use, no stridor, no cervical or supraclavicular adenopathy, JVP not elevated  CARDIAC: Reg, single s1/S2, no m/r/g  PULM: Crackles in lung bases bilaterally  No wheezing or rhonchi  ABD: Normoactive bowel sounds, soft nontender, nondistended, no rebound, no rigidity, no guarding  EXT: No cyanosis, no clubbing, no edema, normal capillary refill  NEURO: no focal neurologic deficits, AAOx3, moving all extremities appropriately    Diagnostic Data:  Labs: I personally reviewed the most recent laboratory data pertinent to today's visit  I have personally reviewed pertinent lab results  Lab Results   Component Value Date    WBC 10 20 (H) 07/15/2020    HGB 11 5 07/15/2020    HCT 36 9 07/15/2020    MCV 84 07/15/2020     07/15/2020     Lab Results   Component Value Date    GLUCOSE 168 (H) 11/30/2018    CALCIUM 9 5 07/15/2020    K 4 2 07/15/2020    CO2 30 07/15/2020     07/15/2020    BUN 19 07/15/2020    CREATININE 1 47 (H) 07/15/2020     No results found for: IGE  Lab Results   Component Value Date    ALT 17 07/14/2020    AST 39 07/14/2020    ALKPHOS 74 07/14/2020       PFTs:  The most recent pulmonary function tests were reviewed    Jonah Crumbly in office 8/2/19  Prebronchodilator  FVC - 2 29 (79%)  FEV1 - 2 20 (83%)  FEV1/FVC - 76%     Post bronchodilator  FVC - 2 37 (82%)  FEV1 - 1 86 (84%)  FEV1/FVC -  78%  Very mild restriction      6 minute walk 8/2/19  Starting saturation - 94%   Starting HR - 74 bpm  Lowest saturation - 90%    Highest HR - 94 bpm  Ambulated - 252 m without the need for oxygen     Imaging  I personally reviewed the images on the [facility] system pertinent to today's visit  No imaging noted in system     Cardiac:  SUMMARY  LEFT VENTRICLE:  Systolic function was normal  Ejection fraction was estimated in the range of 55 %  Although no diagnostic regional wall motion abnormality was identified, this possibility cannot be completely excluded on the basis of this study  LEFT ATRIUM:  The atrium was dilated  MITRAL VALVE:  There was trace regurgitation      Sleep studies:  Diagnostic: 2011 -  AHI 13 1, severe hypoxia (sat 66%)     CPAP 5/8/19  Mrs Fuentes underwent a titration of CPAP to 10 cc of H2O pressure  She did well at 9 cc of H2O pressure in REM when in  the nonsupine position  However, she did not sleep much in the supine position  Therefore, I recommend a trial of autotitrating  CPAP 9 -20 cc of H2O pressure with Cflex 3 and heated humidification using a small Resmed Airfit F20  Compliance should be evaluated in 1-2 months  In addition, she had an increased number of limb movements (PLM index 35 7)   If she still has daytime sleepiness, I would  recommend pharmacologic treatment with Neurontin, Lyrica, Mirapex or Requip     New Compliance Data:  Type of CPAP: CPAP 10 - 20, mean 10 2, peak 12 5                                   6/27/20 - 7/26/20                                   Percent usage: 50%, > 4 hrs 26%                                   Average time used: 2 hrs  29 mins, up to 9 hrs 2 mins                                  Time in large leak: 12 mins 16 seconds                                   Residual AHI: 3 2    Compliance Data:  Type of CPAP: CPAP 10 - 20                                    8/17/19 - 11/14/19, mean 10 5, Max 17 1                                   Percent usage: 100%, > 4 hrs 83%                                   Average time used: 5 hrs 48 mins, up to 9 hrs 27 mins                                  Time in large leak: 3 mins 8 seconds                                   Residual AHI: 6 5     Compliance Data:  Type of CPAP: CPAP 9 - 20 6/29/19 - 7/28/19, mean 10 2, Max 14 1                                   Percent usage: 100%                                   Average time used: 6 hrs 22 mins, up to 8 hrs 32 mins                                  Time in large leak: 32 seconds                                   Residual AHI: 4 9        Compliance Data:  Type of CPAP: CPAP 12 3/5/19 - 4/3/19                                   Percent usage: 100%                                   Average time used: 5 hrs 40 mins, up to 8 hrs 32 mins                                  Time in large leak: 7 mins                                   Residual AHI: 2 5        Compliance Data:  Type of CPAP:  10                                   Average time used: 2 hrs                                   Residual AHI: 2 2       Lit Greenwood DO

## 2020-07-28 ENCOUNTER — TELEPHONE (OUTPATIENT)
Dept: GASTROENTEROLOGY | Facility: CLINIC | Age: 70
End: 2020-07-28

## 2020-07-28 ENCOUNTER — TELEPHONE (OUTPATIENT)
Dept: NEPHROLOGY | Facility: CLINIC | Age: 70
End: 2020-07-28

## 2020-07-28 NOTE — PROGRESS NOTES
Progress note - Pulmonary Medicine   Brenden Luna 69 y o  female MRN: [id number]       Impression & Plan:   70 y o  F with PMHx of DM, HTN, Hyperlipidemia and hiatal hernia s/p repair who comes in for follow up with recent respiratory failure requiring mechanical ventilation due to aspiration, BARBARA s/p UPPP on CPAP     1   Chronic hypoxic respiratory failure on 1L of oxygen with exertion - this is secondary to prolonged hospitalization requiring mechanical secondary to aspiration pneumonia       -  Continue 1L of oxygen with ambulation       -  Obtain records from hospital in [address] [place] [address] to follow with speech therapy  Aspiration precautions  -  Will have her get repeat imaging to assess for ILD        -  Repeat PFTs after this event to ensure there is no worsening          -  Consider increasing diuretics to see if hypoxia will improve  2   Mild BARBARA (AHI - 13 1) now on autoPAP 9-20  Current compliance is skewed due to hospitalization  She does note clinical benefit from the CPAP      -  continue autoPAP to 9-20 cc of H2O pressure to help get rid of a few more events         -  Follow compliance at next visit       -  She is aware of the risk of leaving sleep apnea untreated including hypertension, heart failure, arrhythmia, MI and stroke      3   RLS and significant neuropathy -  she states that this this is stable          -  Continue Neurontin 300mg PO at dinner and 600mg at bedtime          -  Continue Mirapex 0 25 qHS          -  Also on methacarbamol as well     4   Mild pulmonary hypertension/RV hypertrophy on echo -  repeat echo does not demonstrate these changes  Would still consider a diuretic     ______________________________________________________________________    HPI:    Brenden Luna presents today for follow-up  Unfortunately she went to [place] at the end of June and became very ill    She had an episodes where she had vomiting medication and cause large volume aspiration  She was admitted to ICU there and had a prolonged hospitalization for approximately 17 days, 7 of them were on a ventilator  She was treated with antibiotics and slowly improved clinically  She then finally returned back to [place] in early July  Since that time she has been at home recovering from her illness  She did require oxygen when she was discharged from the hospital   She states she has been monitoring her saturation and will note that she will get desaturation with ambulation  She also notes a productive cough and chest congestion  She denies fever, chills or night sweats  As far as her sleep goes, she is having difficulty getting back into her routine  She is using her CPAP but is not as consistent as she was previously  She has noted that if she uses nasal steroids that will improve her nasal congestion  She also states that her restless legs have been stable as well  Review of Systems:  Review of Systems   Constitutional: Positive for fatigue  Negative for appetite change and unexpected weight change  HENT: Negative  Eyes: Negative  Respiratory: Positive for cough, chest tightness, shortness of breath and wheezing  Cardiovascular: Negative  Gastrointestinal: Negative  Endocrine: Negative  Genitourinary: Negative  Musculoskeletal: Negative  Skin: Negative  Allergic/Immunologic: Negative  Neurological: Negative  Hematological: Negative  Psychiatric/Behavioral: Negative            Social history updates:  Social History     Tobacco Use   Smoking Status Former Smoker    Packs/day: 2 00    Years: 10 00    Pack years: 20 00    Types: Cigarettes    Last attempt to quit:     Years since quittin 6   Smokeless Tobacco Never Used     Social History     Socioeconomic History    Marital status: /Civil Union     Spouse name: Not on file    Number of children: Not on file    Years of education: Not on file    Highest education level: Not on file   Occupational History    Not on file   Social Needs    Financial resource strain: Not on file    Food insecurity:     Worry: Not on file     Inability: Not on file    Transportation needs:     Medical: Not on file     Non-medical: Not on file   Tobacco Use    Smoking status: Former Smoker     Packs/day: 2 00     Years: 10 00     Pack years: 20 00     Types: Cigarettes     Last attempt to quit:      Years since quittin 6    Smokeless tobacco: Never Used   Substance and Sexual Activity    Alcohol use: Not Currently     Frequency: Monthly or less     Drinks per session: 1 or 2     Binge frequency: Never     Comment: rare    Drug use: No    Sexual activity: Not on file   Lifestyle    Physical activity:     Days per week: Not on file     Minutes per session: Not on file    Stress: Not on file   Relationships    Social connections:     Talks on phone: Not on file     Gets together: Not on file     Attends Religion service: Not on file     Active member of club or organization: Not on file     Attends meetings of clubs or organizations: Not on file     Relationship status: Not on file    Intimate partner violence:     Fear of current or ex partner: Not on file     Emotionally abused: Not on file     Physically abused: Not on file     Forced sexual activity: Not on file   Other Topics Concern    Not on file   Social History Narrative    Not on file       PhysicalExamination:  Vitals:   /80 (BP Location: Right arm, Patient Position: Sitting)   Pulse 76   Temp 98 2 °F (36 8 °C)   Ht 5' 2" (1 575 m)   Wt 73 9 kg (163 lb)   SpO2 91%   BMI 29 81 kg/m²   General: Pleasant, Awake alert and oriented x 3, conversant without conversational dyspnea, NAD, normal affect  HEENT:  PERRL, Sclera noninjected, nonicteric OU, Nares patent,  no craniofacial abnormalities, Mucous membranes, moist, no oral lesions, normal dentition  NECK: Trachea midline, no accessory muscle use, no stridor, no cervical or supraclavicular adenopathy, JVP not elevated  CARDIAC: Reg, single s1/S2, no m/r/g  PULM: Crackles in lung bases bilaterally  No wheezing or rhonchi  ABD: Normoactive bowel sounds, soft nontender, nondistended, no rebound, no rigidity, no guarding  EXT: No cyanosis, no clubbing, no edema, normal capillary refill  NEURO: no focal neurologic deficits, AAOx3, moving all extremities appropriately    Diagnostic Data:  Labs: I personally reviewed the most recent laboratory data pertinent to today's visit  I have personally reviewed pertinent lab results  Lab Results   Component Value Date    WBC 10 20 (H) 07/15/2020    HGB 11 5 07/15/2020    HCT 36 9 07/15/2020    MCV 84 07/15/2020     07/15/2020     Lab Results   Component Value Date    GLUCOSE 168 (H) 11/30/2018    CALCIUM 9 5 07/15/2020    K 4 2 07/15/2020    CO2 30 07/15/2020     07/15/2020    BUN 19 07/15/2020    CREATININE 1 47 (H) 07/15/2020     No results found for: IGE  Lab Results   Component Value Date    ALT 17 07/14/2020    AST 39 07/14/2020    ALKPHOS 74 07/14/2020       PFTs:  The most recent pulmonary function tests were reviewed  Gerardo Fall in office 8/2/19  Prebronchodilator  FVC - 2 29 (79%)  FEV1 - 2 20 (83%)  FEV1/FVC - 76%     Post bronchodilator  FVC - 2 37 (82%)  FEV1 - 1 86 (84%)  FEV1/FVC -  78%  Very mild restriction      6 minute walk 8/2/19  Starting saturation - 94%   Starting HR - 74 bpm  Lowest saturation - 90%    Highest HR - 94 bpm  Ambulated - 252 m without the need for oxygen     Imaging  I personally reviewed the images on the [facility] system pertinent to today's visit  No imaging noted in system     Cardiac:  SUMMARY  LEFT VENTRICLE:  Systolic function was normal  Ejection fraction was estimated in the range of 55 %  Although no diagnostic regional wall motion abnormality was identified, this possibility cannot be completely excluded on the basis of this study    LEFT ATRIUM:  The atrium was dilated  MITRAL VALVE:  There was trace regurgitation      Sleep studies:  Diagnostic: 2011 -  AHI 13 1, severe hypoxia (sat 66%)     CPAP 5/8/19  Mrs Fuentes underwent a titration of CPAP to 10 cc of H2O pressure  She did well at 9 cc of H2O pressure in REM when in  the nonsupine position  However, she did not sleep much in the supine position  Therefore, I recommend a trial of autotitrating  CPAP 9 -20 cc of H2O pressure with Cflex 3 and heated humidification using a small Resmed Airfit F20  Compliance should be evaluated in 1-2 months  In addition, she had an increased number of limb movements (PLM index 35 7)   If she still has daytime sleepiness, I would  recommend pharmacologic treatment with Neurontin, Lyrica, Mirapex or Requip     New Compliance Data:  Type of CPAP: CPAP 10 - 20, mean 10 2, peak 12 5                                   6/27/20 - 7/26/20                                   Percent usage: 50%, > 4 hrs 26%                                   Average time used: 2 hrs 29 mins, up to 9 hrs 2 mins                                  Time in large leak: 12 mins 16 seconds                                   Residual AHI: 3 2    Compliance Data:  Type of CPAP: CPAP 10 - 20                                    8/17/19 - 11/14/19, mean 10 5, Max 17 1                                   Percent usage: 100%, > 4 hrs 83%                                   Average time used: 5 hrs 48 mins, up to 9 hrs 27 mins                                  Time in large leak: 3 mins 8 seconds                                   Residual AHI: 6 5     Compliance Data:  Type of CPAP: CPAP 9 - 20 6/29/19 - 7/28/19, mean 10 2, Max 14 1                                   Percent usage: 100%                                   Average time used: 6 hrs 22 mins, up to 8 hrs 32 mins                                  Time in large leak: 32 seconds                                   Residual AHI: 4 9        Compliance Data:  Type of CPAP: CPAP 12 3/5/19 - 4/3/19                                   Percent usage: 100%                                   Average time used: 5 hrs 40 mins, up to 8 hrs 32 mins                                  Time in large leak: 7 mins                                   Residual AHI: 2 5        Compliance Data:  Type of CPAP:  10                                   Average time used: 2 hrs                                   Residual AHI: 2 2       Kali Partida DO

## 2020-07-28 NOTE — TELEPHONE ENCOUNTER
Patients GI provider:  Dr Tony Rodriguez    Number to return call: [phone]     Reason for call: Pt calling wanting to let Dr Tony Rodriguez know last month she had respiratory failure requiring her to be on ventilation for 7 days   Pt states she is doing fine now but is on 1L of oxygen    Scheduled procedure/appointment date if applicable: Procedure - 08/10/20

## 2020-07-28 NOTE — TELEPHONE ENCOUNTER
----- Message from Chelsie Webb MD sent at 7/28/2020 10:56 AM EDT -----  For would ever reason the patient refused the urine protein creatinine ratio and CMP  If she would at least go for the urine protein creatinine ratio in the morning it is imperative

## 2020-07-28 NOTE — TELEPHONE ENCOUNTER
I spoke to the patient she will re attempt to provide a urine specimen  CMP was not completed as she was told it was completed on 7/14/2020 by the lab and insurance would not cover it possibly

## 2020-07-29 ENCOUNTER — TELEPHONE (OUTPATIENT)
Dept: PULMONOLOGY | Facility: CLINIC | Age: 70
End: 2020-07-29

## 2020-07-29 LAB
CREAT ?TM UR-SCNC: 130 UMOL/L
EXT PROTEIN URINE: 239.9
PROT/CREAT UR: 1.85 MG/G{CREAT}

## 2020-07-29 NOTE — TELEPHONE ENCOUNTER
Ric Max from [address] [place] [address] calling spoke to someone yesterday  Following up on form to be sign and faxed back   Would like an update    phone [phone]  Fax # [phone]

## 2020-07-29 NOTE — PROGRESS NOTES
Virtual Regular Visit      Assessment/Plan:  1   CKD stage 3 :  · Etiology:  Diabetic nephropathy/hypertensive nephrosclerosis/arteriolar nephrosclerosis/chronic NSAID use   No evidence of obstructive uropathy, renal artery disease or primary glomerular disease with a bland urinalysis  Of note, CPK was normal at 105 no evidence rhabdomyolysis  · Baseline creatinine:  1 5-2 0  · Current creatinine:   1 47  at baseline  · Urine protein creatinine ratio:  1 85 g:  This is unusually high for the patient  She is diabetic this may be diabetic nephropathy  THIS MAY HAVE BEEN ALSO RELATED TO THE CURRENT INFECTION/ASPIRATION PNEUMONIA  I would simply repeat a urine protein creatinine ratio in 1 week to make sure it is not spurious, if persistently elevated she should have serologically evaluation make sure no other obvious etiology besides or diabetes mellitus  In addition, I would consider adjusting her ARB/blood pressure control  · UA demonstrated no significant hematuria but 2+ proteinuria  Recommendations:  · Treat hypertension-please see below  · Treat dyslipidemia-please see below  · Maintain proteinuria less than 1 g or as low as possible  · Avoid nephrotoxic agents such as NSAIDs, patient counseled as such  · Obtain follow urine protein creatinine ratio to determine whether not she needs serologies  2   Volume:  Patient apparently appeared slightly volume overloaded per Pulmonary  Their recommendation was increasing torsemide to 20 a day    3   Hypertension:  Workup:  · saline suppression test for primary aldosterone state was normal  · plasma free metanephrines was negative  · renal artery duplex was negative 02/2019       Current blood pressure averages:   A m :  137/86      · Goal blood pressure:  less than 125/75 given proteinuria  Recommendations:  · Push nonmedical regimen including weight loss, isotonic exercise and avoidance of salt; patient counseled as such  · Medication changes today:  Increase torsemide to 20 mg daily in recheck blood pressures in 3 weeks  4   Electrolytes:  all acceptable including a potassium of 4 5  5   Mineral bone disorder:  · Calcium/magnesium/phosphorus:  Magnesium slightly elevated so decrease to twice a day supplementation  · PTH intact:  57 6 acceptable   · Vitamin-D:  34  6   Dyslipidemia:  · Goal LDL:  Less than 100  · Current lipid profile:  LDL 92/HDL 41/triglycerides 451  Recommendations:  Per Endocrinology  7   Anemia:  Hemoglobin:  Stable at 11 6, low iron saturation/ferritin:  Recommend oral iron  8   Other problems:  · Depression  · Diabetes mellitus per primary medical physician  · Hypothyroidism  · BARBARA on CPAP  · Fibromyalgia/osteoarthritis  · Nephrolithiasis  · Basal cell/squamous CA of the skin  · Urinary stress incontinence  · Status post incisional hernia repairs  · Recent hospitalization as outlined below from severe GERD with hypoxemia and shortness of breath from a failed Nissen fundoplication from prior hiatal hernia repair  Patient is due to have further testing with an EGD by GI and then subsequently thoracic surgery consultation for possible repair  In addition, she was placed on Protonix 40 b i d  In Pepcid 40 mg hs  · Hospitalization on 07/14/2020 secondary confusion at home  Apparently she had protracted hospital course in [place] following aspiration pneumonia  Ten days in the ICU on a ventilator  No overt infectious process  Low probability for pulmonary embolus despite an elevated D-dimer  Had mild leukocytosis/SIRS criteria subsequently discharged 1 day later when she clinically improved  Symptoms were felt secondary to her protracted ICU course    · THE PATIENT WAS TOLD TO INCREASE THE TORSEMIDE TO 20 MG DAILY BY PULMONARY WHO SAW HER YESTERDAY, AT LEAST TOLD SHE HAD RALES AND MINOR SWELLLING       Problem List Items Addressed This Visit        Endocrine    Diabetic nephropathy associated with type 2 diabetes mellitus ([facility] [address] )    Relevant Medications    torsemide (DEMADEX) 10 mg tablet    Other Relevant Orders    Basic metabolic panel    Protein / creatinine ratio, urine    Comprehensive metabolic panel    CBC    CK    Lipid Panel with Direct LDL reflex    Magnesium    Phosphorus    Protein / creatinine ratio, urine    PTH, intact    Vitamin D 25 hydroxy    Urinalysis with microscopic    Urinalysis with microscopic       Genitourinary    Hypertensive chronic kidney disease with stage 1 through stage 4 chronic kidney disease, or unspecified chronic kidney disease - Primary    Relevant Medications    torsemide (DEMADEX) 10 mg tablet    Other Relevant Orders    Basic metabolic panel    Protein / creatinine ratio, urine    Comprehensive metabolic panel    CBC    CK    Lipid Panel with Direct LDL reflex    Magnesium    Phosphorus    Protein / creatinine ratio, urine    PTH, intact    Vitamin D 25 hydroxy    Urinalysis with microscopic    Urinalysis with microscopic    CKD (chronic kidney disease) stage 3, GFR 30-59 ml/min ([facility])    Relevant Medications    torsemide (DEMADEX) 10 mg tablet    Other Relevant Orders    Basic metabolic panel    Protein / creatinine ratio, urine    Comprehensive metabolic panel    CBC    CK    Lipid Panel with Direct LDL reflex    Magnesium    Phosphorus    Protein / creatinine ratio, urine    PTH, intact    Vitamin D 25 hydroxy    Urinalysis with microscopic    Urinalysis with microscopic    Anemia of chronic renal failure, stage 3 (moderate) ([facility])    Relevant Medications    torsemide (DEMADEX) 10 mg tablet    Other Relevant Orders    Basic metabolic panel    Protein / creatinine ratio, urine    Comprehensive metabolic panel    CBC    CK    Lipid Panel with Direct LDL reflex    Magnesium    Phosphorus    Protein / creatinine ratio, urine    PTH, intact    Vitamin D 25 hydroxy    Urinalysis with microscopic    Urinalysis with microscopic       Other    Persistent proteinuria    Relevant Orders    Basic metabolic panel    Protein / creatinine ratio, urine    Comprehensive metabolic panel    CBC    CK    Lipid Panel with Direct LDL reflex    Magnesium    Phosphorus    Protein / creatinine ratio, urine    PTH, intact    Vitamin D 25 hydroxy    Urinalysis with microscopic    Urinalysis with microscopic    Dyslipidemia    Relevant Orders    Basic metabolic panel    Protein / creatinine ratio, urine    Comprehensive metabolic panel    CBC    CK    Lipid Panel with Direct LDL reflex    Magnesium    Phosphorus    Protein / creatinine ratio, urine    PTH, intact    Vitamin D 25 hydroxy    Urinalysis with microscopic    Urinalysis with microscopic    Vitamin D deficiency    Relevant Orders    Basic metabolic panel    Protein / creatinine ratio, urine    Comprehensive metabolic panel    CBC    CK    Lipid Panel with Direct LDL reflex    Magnesium    Phosphorus    Protein / creatinine ratio, urine    PTH, intact    Vitamin D 25 hydroxy    Urinalysis with microscopic    Urinalysis with microscopic      Other Visit Diagnoses     Essential hypertension        Relevant Medications    torsemide (DEMADEX) 10 mg tablet    Other Relevant Orders    Basic metabolic panel    Protein / creatinine ratio, urine    Comprehensive metabolic panel    CBC    CK    Lipid Panel with Direct LDL reflex    Magnesium    Phosphorus    Protein / creatinine ratio, urine    PTH, intact    Vitamin D 25 hydroxy    Urinalysis with microscopic    Urinalysis with microscopic    Chronic kidney disease, stage III (moderate) (HCC)        Relevant Medications    torsemide (DEMADEX) 10 mg tablet    Other Relevant Orders    Basic metabolic panel    Protein / creatinine ratio, urine    Comprehensive metabolic panel    CBC    CK    Lipid Panel with Direct LDL reflex    Magnesium    Phosphorus    Protein / creatinine ratio, urine    PTH, intact    Vitamin D 25 hydroxy    Urinalysis with microscopic    Urinalysis with microscopic               Reason for visit is   Chief Complaint   Patient presents with    Virtual Regular Visit        Encounter provider Nando Maldonado MD    Provider located at [address] [place] [id number]      Recent Visits  Date Type Provider Dept   07/28/20 Telephone Anjana Spence recent visits within past 7 days and meeting all other requirements     Today's Visits  Date Type Provider Dept   08/03/20 Telemedicine Nando Maldonado MD [id number] today's visits and meeting all other requirements     Future Appointments  No visits were found meeting these conditions  Showing future appointments within next 150 days and meeting all other requirements        The patient was identified by name and date of birth  Margaret Francis was informed that this is a telemedicine visit and that the visit is being conducted through telephone  My office door was closed  No one else was in the room  She acknowledged consent and understanding of privacy and security of the video platform  The patient has agreed to participate and understands they can discontinue the visit at any time  Patient is aware this is a billable service  Subjective  Margaret Francis is a 69 y o  female with history of diabetes mellitus/CKD stage 3      HPI   Recently in the hospital in [place] please see above  And then subsequently 1 day admission for confusion at [facility]    The patient currently still weak with vns on oxygen, speech therapy helping with swallowing and the GI reflux  No fevers, chills, she has had a cough with more yellow sputum (Dr Mina Dewey to manage)  Good appetite and poor energy  No hematuria, dysuria, voiding symptoms or foamy urine  Thin  Stool per no diarrhea, no nausea or vomiting; no significant reflux symptoms  No chest pain, slightly increased shortness of breath with exertion on oxygen but overall better, mild swelling  No headaches, dizziness or lightheadedness  Blood pressure medications:  · Amlodipine 5 mg daily in the evening  · Metoprolol 50 mg twice a day  · Olmesartan 10 mg daily in the evening  · Torsemide 10 mg daily    Past Medical History:   Diagnosis Date    Anxiety     Arthritis     Aspiration into airway     Diabetes mellitus ([facility] [address] )     Family history of thyroid problem     Heart burn     Hyperplasia, parathyroid ([facility] [address] )     Hypertension     Kidney problem     Obesity (BMI 30 0-34  9)     Pneumonia     RLS (restless legs syndrome)     Seasonal allergies     Sleep apnea     uses CPAP    Swollen ankles        Past Surgical History:   Procedure Laterality Date    ARTHROSCOPY KNEE      BREAST BIOPSY      CHOLECYSTECTOMY      HIATAL HERNIA REPAIR      LIPOSUCTION      REDUCTION MAMMAPLASTY      SQUAMOUS CELL CARCINOMA EXCISION      TOE SURGERY      TONSILLECTOMY      TUBAL LIGATION         Current Outpatient Medications   Medication Sig Dispense Refill    albuterol (Ventolin HFA) 90 mcg/act inhaler Inhale 2 puffs every 6 (six) hours as needed for wheezing or shortness of breath  0    amLODIPine (NORVASC) 5 mg tablet Take 1 tablet (5 mg total) by mouth 2 (two) times a day (Patient taking differently: Take 5 mg by mouth daily at bedtime ) 180 tablet 3    b complex vitamins tablet Take 1 tablet by mouth daily       B-D ULTRAFINE III SHORT PEN 31G X 8 MM MISC USE AS DIRECTED 4 TIMES PER DAY  3    Calcium Carbonate (CALCIUM 600 PO) Take 1 capsule by mouth 2 (two) times a day      calcium carbonate (TUMS) 500 mg chewable tablet Chew 2 tablets (1,000 mg total) daily as needed for indigestion or heartburn  0    DULoxetine (CYMBALTA) 60 mg delayed release capsule Take 90 mg by mouth daily       famotidine (PEPCID) 40 MG tablet TAKE 1 TABLET BY MOUTH EVERY DAY 30 tablet 3    fluticasone (FLONASE) 50 mcg/act nasal spray 1-2 sprays daily      gabapentin (NEURONTIN) 300 mg capsule 300 mg 2 (two) times a day       guaiFENesin (MUCINEX) 600 mg 12 hr tablet Take 1 tablet (600 mg total) by mouth every 12 (twelve) hours 20 tablet 0    Icosapent Ethyl (Vascepa) 1 g CAPS Take 1 g by mouth 2 (two) times a day      insulin aspart (NovoLOG) 100 units/mL injection Inject under the skin 3 (three) times a day before meals      levothyroxine 175 mcg tablet Take 175 mcg by mouth daily      Magnesium 500 MG CAPS Take 1 capsule by mouth 2 (two) times a day      metoprolol tartrate (LOPRESSOR) 50 mg tablet Take 50 mg by mouth every 12 (twelve) hours        olmesartan (BENICAR) 5 mg tablet Take 2 tablets (10 mg total) by mouth daily 180 tablet 3    pantoprazole (PROTONIX) 40 mg tablet TAKE 1 TABLET BY MOUTH EVERY DAY 30 tablet 3    pramipexole (MIRAPEX) 0 25 mg tablet Take 0 25 mg by mouth daily      tiotropium (SPIRIVA) 18 mcg inhalation capsule Place 18 mcg into inhaler and inhale daily      torsemide (DEMADEX) 10 mg tablet Take 2 tablets (20 mg total) by mouth daily 180 tablet 3    Vitamin D, Ergocalciferol, 2000 units CAPS Take 2,000 Units by mouth daily       insulin detemir (LEVEMIR) 100 units/mL subcutaneous injection Inject 15 Units under the skin every morning (Patient taking differently: Inject 15 Units under the skin every morning )  0     No current facility-administered medications for this visit  Allergies   Allergen Reactions    Pollen Extract     Ciprofloxacin Hives       Review of Systems:  Please see HPI, otherwise the review of systems is completely reviewed with the patient are negative    Video Exam    There were no vitals filed for this visit  Physical Exam not able to perform physical exam given the fact patient was unable to do a video tele visit just by phone      Patient was informed that this was a billable visit and they are aware of it and wished to proceed  I reviewed all the patient's medications with the patient during this visit  I have reviewed all the above findings and plans with the patient  I have answered the patient's questions to their complete satisfaction  I informed the patient that we will be mailing out instructions compatible with an after visit summary along with lab slips as needed  The patient was satisfied with all of the information as well as the visit  I spent 25 minutes directly with the patient during this visit    It was my intent to perform this visit via video technology but the patient was not able to do a video connection so the visit was completed via audio telephone only  VIRTUAL VISIT DISCLAIMER    Cyrus Russell acknowledges that she has consented to an online visit or consultation  She understands that the online visit is based solely on information provided by her, and that, in the absence of a face-to-face physical evaluation by the physician, the diagnosis she receives is both limited and provisional in terms of accuracy and completeness  This is not intended to replace a full medical face-to-face evaluation by the physician  Cyrus Russell understands and accepts these terms

## 2020-07-29 NOTE — TELEPHONE ENCOUNTER
Called muriel burns LM as I am unaware of who they spoke to and there is no form in the patient chart that needs to be sign asked the form to be faxed again to [facility]  to be signed and returned back

## 2020-07-30 NOTE — TELEPHONE ENCOUNTER
Marcos with muriel to inform I did receive the form and Dr Conley Duane will complete it tomorrow when he is in the office   Form was forwarded to [employer]

## 2020-07-31 NOTE — TELEPHONE ENCOUNTER
Patient needs to come into the office for 6mw before Dr Madhuri Hernandez can fill out the forms  Called Toni Carpio from [facility] and left her a voicemail making her aware and that we will fax paperwork back on Monday after her appt

## 2020-07-31 NOTE — TELEPHONE ENCOUNTER
Aniceto Worley calling back from [address] [place] [address]  wanted to let office know that Medicare temporary suspended all required testing as of late April, and that all she needs is  the Patient referral form signed by the doctor and faxed   [phone]

## 2020-08-01 DIAGNOSIS — Z20.822 ENCOUNTER FOR LABORATORY TESTING FOR COVID-19 VIRUS: ICD-10-CM

## 2020-08-01 PROCEDURE — U0003 INFECTIOUS AGENT DETECTION BY NUCLEIC ACID (DNA OR RNA); SEVERE ACUTE RESPIRATORY SYNDROME CORONAVIRUS 2 (SARS-COV-2) (CORONAVIRUS DISEASE [COVID-19]), AMPLIFIED PROBE TECHNIQUE, MAKING USE OF HIGH THROUGHPUT TECHNOLOGIES AS DESCRIBED BY CMS-2020-01-R: HCPCS

## 2020-08-02 LAB — SARS-COV-2 RNA SPEC QL NAA+PROBE: NOT DETECTED

## 2020-08-03 ENCOUNTER — TELEMEDICINE (OUTPATIENT)
Dept: NEPHROLOGY | Facility: CLINIC | Age: 70
End: 2020-08-03
Payer: MEDICARE

## 2020-08-03 DIAGNOSIS — I10 ESSENTIAL HYPERTENSION: ICD-10-CM

## 2020-08-03 DIAGNOSIS — E11.21 DIABETIC NEPHROPATHY ASSOCIATED WITH TYPE 2 DIABETES MELLITUS (HCC): ICD-10-CM

## 2020-08-03 DIAGNOSIS — D63.1 ANEMIA OF CHRONIC RENAL FAILURE, STAGE 3 (MODERATE) (HCC): ICD-10-CM

## 2020-08-03 DIAGNOSIS — I12.9 HYPERTENSIVE CHRONIC KIDNEY DISEASE WITH STAGE 1 THROUGH STAGE 4 CHRONIC KIDNEY DISEASE, OR UNSPECIFIED CHRONIC KIDNEY DISEASE: Primary | ICD-10-CM

## 2020-08-03 DIAGNOSIS — R80.1 PERSISTENT PROTEINURIA: ICD-10-CM

## 2020-08-03 DIAGNOSIS — N18.30 CKD (CHRONIC KIDNEY DISEASE) STAGE 3, GFR 30-59 ML/MIN (HCC): ICD-10-CM

## 2020-08-03 DIAGNOSIS — N18.30 ANEMIA OF CHRONIC RENAL FAILURE, STAGE 3 (MODERATE) (HCC): ICD-10-CM

## 2020-08-03 DIAGNOSIS — E55.9 VITAMIN D DEFICIENCY: ICD-10-CM

## 2020-08-03 DIAGNOSIS — N18.30 CHRONIC KIDNEY DISEASE, STAGE III (MODERATE) (HCC): ICD-10-CM

## 2020-08-03 DIAGNOSIS — E78.5 DYSLIPIDEMIA: ICD-10-CM

## 2020-08-03 PROCEDURE — 99443 PR PHYS/QHP TELEPHONE EVALUATION 21-30 MIN: CPT | Performed by: INTERNAL MEDICINE

## 2020-08-03 RX ORDER — TORSEMIDE 10 MG/1
20 TABLET ORAL DAILY
Qty: 180 TABLET | Refills: 3
Start: 2020-08-03 | End: 2020-08-05 | Stop reason: DRUGHIGH

## 2020-08-03 NOTE — LETTER
August 3, 2020     Mitchell Gupta,  [address] [place] [address]     Patient: Beulah Hendrix   YOB: 1950   Date of Visit: 8/3/2020       Dear Dr Rose Mate:    Thank you for referring Beulah Hendrix to me for evaluation  Below are my notes for this consultation  If you have questions, please do not hesitate to call me  I look forward to following your patient along with you  Sincerely,        Claus Hernandez MD        CC: MD Brandi Kim MD Daleen Cocking, DO  Claus Hernandez MD  8/3/2020 10:51 AM  Sign when Signing Visit    Virtual Regular Visit      Assessment/Plan:  1   CKD stage 3 :  · Etiology:  Diabetic nephropathy/hypertensive nephrosclerosis/arteriolar nephrosclerosis/chronic NSAID use   No evidence of obstructive uropathy, renal artery disease or primary glomerular disease with a bland urinalysis  Of note, CPK was normal at 105 no evidence rhabdomyolysis  · Baseline creatinine:  1 5-2 0  · Current creatinine:   1 47  at baseline  · Urine protein creatinine ratio:  1 85 g:  This is unusually high for the patient  She is diabetic this may be diabetic nephropathy  THIS MAY HAVE BEEN ALSO RELATED TO THE CURRENT INFECTION/ASPIRATION PNEUMONIA  I would simply repeat a urine protein creatinine ratio in 1 week to make sure it is not spurious, if persistently elevated she should have serologically evaluation make sure no other obvious etiology besides or diabetes mellitus    In addition, I would consider adjusting her ARB/blood pressure control  · UA demonstrated no significant hematuria but 2+ proteinuria  Recommendations:  · Treat hypertension-please see below  · Treat dyslipidemia-please see below  · Maintain proteinuria less than 1 g or as low as possible  · Avoid nephrotoxic agents such as NSAIDs, patient counseled as such  · Obtain follow urine protein creatinine ratio to determine whether not she needs serologies  2   Volume:  Patient apparently appeared slightly volume overloaded per Pulmonary  Their recommendation was increasing torsemide to 20 a day  3   Hypertension:  Workup:  · saline suppression test for primary aldosterone state was normal  · plasma free metanephrines was negative  · renal artery duplex was negative 02/2019       Current blood pressure averages:   A m :  137/86      · Goal blood pressure:  less than 125/75 given proteinuria  Recommendations:  · Push nonmedical regimen including weight loss, isotonic exercise and avoidance of salt; patient counseled as such  · Medication changes today:  Increase torsemide to 20 mg daily in recheck blood pressures in 3 weeks  4   Electrolytes:  all acceptable including a potassium of 4 5  5   Mineral bone disorder:  · Calcium/magnesium/phosphorus:  Magnesium slightly elevated so decrease to twice a day supplementation  · PTH intact:  57 6 acceptable   · Vitamin-D:  34  6   Dyslipidemia:  · Goal LDL:  Less than 100  · Current lipid profile:  LDL 92/HDL 41/triglycerides 451  Recommendations:  Per Endocrinology  7   Anemia:  Hemoglobin:  Stable at 11 6, low iron saturation/ferritin:  Recommend oral iron  8   Other problems:  · Depression  · Diabetes mellitus per primary medical physician  · Hypothyroidism  · BARBARA on CPAP  · Fibromyalgia/osteoarthritis  · Nephrolithiasis  · Basal cell/squamous CA of the skin  · Urinary stress incontinence  · Status post incisional hernia repairs  · Recent hospitalization as outlined below from severe GERD with hypoxemia and shortness of breath from a failed Nissen fundoplication from prior hiatal hernia repair  Patient is due to have further testing with an EGD by GI and then subsequently thoracic surgery consultation for possible repair  In addition, she was placed on Protonix 40 b i d  In Pepcid 40 mg hs  · Hospitalization on 07/14/2020 secondary confusion at home    Apparently she had protracted hospital course in [place] following aspiration pneumonia  Ten days in the ICU on a ventilator  No overt infectious process  Low probability for pulmonary embolus despite an elevated D-dimer  Had mild leukocytosis/SIRS criteria subsequently discharged 1 day later when she clinically improved  Symptoms were felt secondary to her protracted ICU course    · THE PATIENT WAS TOLD TO INCREASE THE TORSEMIDE TO 20 MG DAILY BY PULMONARY WHO SAW HER YESTERDAY, AT LEAST TOLD SHE HAD RALES AND MINOR SWELLLING       Problem List Items Addressed This Visit        Endocrine    Diabetic nephropathy associated with type 2 diabetes mellitus ([facility] [address] )    Relevant Medications    torsemide (DEMADEX) 10 mg tablet    Other Relevant Orders    Basic metabolic panel    Protein / creatinine ratio, urine    Comprehensive metabolic panel    CBC    CK    Lipid Panel with Direct LDL reflex    Magnesium    Phosphorus    Protein / creatinine ratio, urine    PTH, intact    Vitamin D 25 hydroxy    Urinalysis with microscopic    Urinalysis with microscopic       Genitourinary    Hypertensive chronic kidney disease with stage 1 through stage 4 chronic kidney disease, or unspecified chronic kidney disease - Primary    Relevant Medications    torsemide (DEMADEX) 10 mg tablet    Other Relevant Orders    Basic metabolic panel    Protein / creatinine ratio, urine    Comprehensive metabolic panel    CBC    CK    Lipid Panel with Direct LDL reflex    Magnesium    Phosphorus    Protein / creatinine ratio, urine    PTH, intact    Vitamin D 25 hydroxy    Urinalysis with microscopic    Urinalysis with microscopic    CKD (chronic kidney disease) stage 3, GFR 30-59 ml/min ([facility])    Relevant Medications    torsemide (DEMADEX) 10 mg tablet    Other Relevant Orders    Basic metabolic panel    Protein / creatinine ratio, urine    Comprehensive metabolic panel    CBC    CK    Lipid Panel with Direct LDL reflex    Magnesium    Phosphorus    Protein / creatinine ratio, urine    PTH, intact    Vitamin D 25 hydroxy Urinalysis with microscopic    Urinalysis with microscopic    Anemia of chronic renal failure, stage 3 (moderate) (HCC)    Relevant Medications    torsemide (DEMADEX) 10 mg tablet    Other Relevant Orders    Basic metabolic panel    Protein / creatinine ratio, urine    Comprehensive metabolic panel    CBC    CK    Lipid Panel with Direct LDL reflex    Magnesium    Phosphorus    Protein / creatinine ratio, urine    PTH, intact    Vitamin D 25 hydroxy    Urinalysis with microscopic    Urinalysis with microscopic       Other    Persistent proteinuria    Relevant Orders    Basic metabolic panel    Protein / creatinine ratio, urine    Comprehensive metabolic panel    CBC    CK    Lipid Panel with Direct LDL reflex    Magnesium    Phosphorus    Protein / creatinine ratio, urine    PTH, intact    Vitamin D 25 hydroxy    Urinalysis with microscopic    Urinalysis with microscopic    Dyslipidemia    Relevant Orders    Basic metabolic panel    Protein / creatinine ratio, urine    Comprehensive metabolic panel    CBC    CK    Lipid Panel with Direct LDL reflex    Magnesium    Phosphorus    Protein / creatinine ratio, urine    PTH, intact    Vitamin D 25 hydroxy    Urinalysis with microscopic    Urinalysis with microscopic    Vitamin D deficiency    Relevant Orders    Basic metabolic panel    Protein / creatinine ratio, urine    Comprehensive metabolic panel    CBC    CK    Lipid Panel with Direct LDL reflex    Magnesium    Phosphorus    Protein / creatinine ratio, urine    PTH, intact    Vitamin D 25 hydroxy    Urinalysis with microscopic    Urinalysis with microscopic      Other Visit Diagnoses     Essential hypertension        Relevant Medications    torsemide (DEMADEX) 10 mg tablet    Other Relevant Orders    Basic metabolic panel    Protein / creatinine ratio, urine    Comprehensive metabolic panel    CBC    CK    Lipid Panel with Direct LDL reflex    Magnesium    Phosphorus    Protein / creatinine ratio, urine    PTH, intact    Vitamin D 25 hydroxy    Urinalysis with microscopic    Urinalysis with microscopic    Chronic kidney disease, stage III (moderate) (HCC)        Relevant Medications    torsemide (DEMADEX) 10 mg tablet    Other Relevant Orders    Basic metabolic panel    Protein / creatinine ratio, urine    Comprehensive metabolic panel    CBC    CK    Lipid Panel with Direct LDL reflex    Magnesium    Phosphorus    Protein / creatinine ratio, urine    PTH, intact    Vitamin D 25 hydroxy    Urinalysis with microscopic    Urinalysis with microscopic               Reason for visit is   Chief Complaint   Patient presents with    Virtual Regular Visit        Encounter provider Sandrita Limon MD    Provider located at [address] [place] [id number]      Recent Visits  Date Type Provider Dept   07/28/20 Telephone [address]   Showing recent visits within past 7 days and meeting all other requirements     Today's Visits  Date Type Provider Dept   08/03/20 [place]medicine [place] [id number] [facility] today's visits and meeting all other requirements     Future Appointments  No visits were found meeting these conditions  Showing future appointments within next 150 days and meeting all other requirements        The patient was identified by name and date of birth  Bairon Todd was informed that this is a telemedicine visit and that the visit is being conducted through telephone  My office door was closed  No one else was in the room  She acknowledged consent and understanding of privacy and security of the video platform  The patient has agreed to participate and understands they can discontinue the visit at any time  Patient is aware this is a billable service       Subjective  Bairon Todd is a 69 y o  female with history of diabetes mellitus/CKD stage 3      HPI   Recently in the hospital in [place] please see above   And then subsequently 1 day admission for confusion at [facility]  The patient currently still weak with vns on oxygen, speech therapy helping with swallowing and the GI reflux  No fevers, chills, she has had a cough with more yellow sputum (Dr Mirna Cabrera to manage)  Good appetite and poor energy  No hematuria, dysuria, voiding symptoms or foamy urine  Thin  Stool per no diarrhea, no nausea or vomiting; no significant reflux symptoms  No chest pain, slightly increased shortness of breath with exertion on oxygen but overall better, mild swelling  No headaches, dizziness or lightheadedness  Blood pressure medications:  · Amlodipine 5 mg daily in the evening  · Metoprolol 50 mg twice a day  · Olmesartan 10 mg daily in the evening  · Torsemide 10 mg daily    Past Medical History:   Diagnosis Date    Anxiety     Arthritis     Aspiration into airway     Diabetes mellitus ([facility] [address] )     Family history of thyroid problem     Heart burn     [facility], [facility]     Hypertension     Kidney problem     Obesity (BMI 30 0-34  9)     Pneumonia     RLS (restless legs syndrome)     Seasonal allergies     Sleep apnea     uses CPAP    Swollen ankles        Past Surgical History:   Procedure Laterality Date    ARTHROSCOPY KNEE      BREAST BIOPSY      CHOLECYSTECTOMY      HIATAL HERNIA REPAIR      LIPOSUCTION      REDUCTION MAMMAPLASTY      SQUAMOUS CELL CARCINOMA EXCISION      TOE SURGERY      TONSILLECTOMY      TUBAL LIGATION         Current Outpatient Medications   Medication Sig Dispense Refill    albuterol (Ventolin HFA) 90 mcg/act inhaler Inhale 2 puffs every 6 (six) hours as needed for wheezing or shortness of breath  0    amLODIPine (NORVASC) 5 mg tablet Take 1 tablet (5 mg total) by mouth 2 (two) times a day (Patient taking differently: Take 5 mg by mouth daily at bedtime ) 180 tablet 3    b complex vitamins tablet Take 1 tablet by mouth daily       B-D ULTRAFINE III SHORT PEN 31G X 8 MM MISC USE AS DIRECTED 4 TIMES PER DAY  3    Calcium Carbonate (CALCIUM 600 PO) Take 1 capsule by mouth 2 (two) times a day      calcium carbonate (TUMS) 500 mg chewable tablet Chew 2 tablets (1,000 mg total) daily as needed for indigestion or heartburn  0    DULoxetine (CYMBALTA) 60 mg delayed release capsule Take 90 mg by mouth daily       famotidine (PEPCID) 40 MG tablet TAKE 1 TABLET BY MOUTH EVERY DAY 30 tablet 3    fluticasone (FLONASE) 50 mcg/act nasal spray 1-2 sprays daily      gabapentin (NEURONTIN) 300 mg capsule 300 mg 2 (two) times a day       guaiFENesin (MUCINEX) 600 mg 12 hr tablet Take 1 tablet (600 mg total) by mouth every 12 (twelve) hours 20 tablet 0    Icosapent Ethyl (Vascepa) 1 g CAPS Take 1 g by mouth 2 (two) times a day      insulin aspart (NovoLOG) 100 units/mL injection Inject under the skin 3 (three) times a day before meals      levothyroxine 175 mcg tablet Take 175 mcg by mouth daily      Magnesium 500 MG CAPS Take 1 capsule by mouth 2 (two) times a day      metoprolol tartrate (LOPRESSOR) 50 mg tablet Take 50 mg by mouth every 12 (twelve) hours        olmesartan (BENICAR) 5 mg tablet Take 2 tablets (10 mg total) by mouth daily 180 tablet 3    pantoprazole (PROTONIX) 40 mg tablet TAKE 1 TABLET BY MOUTH EVERY DAY 30 tablet 3    pramipexole (MIRAPEX) 0 25 mg tablet Take 0 25 mg by mouth daily      tiotropium (SPIRIVA) 18 mcg inhalation capsule Place 18 mcg into inhaler and inhale daily      torsemide (DEMADEX) 10 mg tablet Take 2 tablets (20 mg total) by mouth daily 180 tablet 3    Vitamin D, Ergocalciferol, 2000 units CAPS Take 2,000 Units by mouth daily       insulin detemir (LEVEMIR) 100 units/mL subcutaneous injection Inject 15 Units under the skin every morning (Patient taking differently: Inject 15 Units under the skin every morning )  0     No current facility-administered medications for this visit           Allergies   Allergen Reactions    Pollen Extract     Ciprofloxacin Hives       Review of Systems:  Please see HPI, otherwise the review of systems is completely reviewed with the patient are negative    Video Exam    There were no vitals filed for this visit  Physical Exam not able to perform physical exam given the fact patient was unable to do a video tele visit just by phone      Patient was informed that this was a billable visit and they are aware of it and wished to proceed  I reviewed all the patient's medications with the patient during this visit  I have reviewed all the above findings and plans with the patient  I have answered the patient's questions to their complete satisfaction  I informed the patient that we will be mailing out instructions compatible with an after visit summary along with lab slips as needed  The patient was satisfied with all of the information as well as the visit  I spent 25 minutes directly with the patient during this visit    It was my intent to perform this visit via video technology but the patient was not able to do a video connection so the visit was completed via audio telephone only  VIRTUAL VISIT DISCLAIMER    Bakari Mora acknowledges that she has consented to an online visit or consultation  She understands that the online visit is based solely on information provided by her, and that, in the absence of a face-to-face physical evaluation by the physician, the diagnosis she receives is both limited and provisional in terms of accuracy and completeness  This is not intended to replace a full medical face-to-face evaluation by the physician  Bakari Mora understands and accepts these terms

## 2020-08-03 NOTE — TELEPHONE ENCOUNTER
6mw test was found and completed form, office notes, and 6MW have been faxed to Toni Carpio from [facility]

## 2020-08-03 NOTE — PATIENT INSTRUCTIONS
1  Medication changes today:  · Please increase torsemide to 20 mg or 2-10 mg tablets in the morning  · PLEASE DECREASE MAGNESIUM TO 2 PILLS A DAY OR TWICE A DAY  · Please take over-the-counter iron once a day, watch for constipation    2  Please go for nonfasting lab work but in the morning in approximately 1-2 weeks after adjusting the torsemide     3  Please wait 3 weeks after making the above medication changes and then take an additional week a blood pressure readings morning and evening, sitting and standing as follows:  · TAKE THE MORNING READINGS BEFORE ANY MEDICATIONS AND WHEN YOU ARE RELAX FOR SEVERAL MINUTES  · TAKE THE EVENING READINGS BEFORE SUPPER/DINNER AND BEFORE ANY MEDICATIONS AND AGAIN WHEN YOU ARE RELAX FOR SEVERAL MINUTES  · PLEASE INCLUDE HEART RATE WITH YOUR BLOOD PRESSURE READINGS  · When taking standing readings, keep your arm supported at heart level and not dangling  · Make sure you are sitting with your back supported and feet on the ground and do not cross your legs or feet  · Make sure you have not taken any coffee or caffeine products or exercised or smoke cigarettes at least 30 minutes before taking your blood pressure  Then please mail these into the office    4  Please go for fasting lab work in 3 months    5  Follow-up in 4 months:  · Please bring in 1 week a blood pressure readings morning evening, sitting and standing is outlined above  · PLEASE BRING IN YOUR BLOOD PRESSURE MACHINE AT THE NEXT VISIT    6  General instructions:   AVOID SALT BUT NOT ADDING AN READING LABELS TO MAKE SURE THERE IS LOW-SALT IN THE FOOD THAT YOU ARE EATING     Avoid nonsteroidal anti-inflammatory drugs such as Naprosyn, ibuprofen, Aleve, Advil, Celebrex, Meloxicam (Mobic) etc   You can use Tylenol as needed if you do not have any liver condition to be concerned about     Avoid medications such as Sudafed or decongestants and antihistamines that contained the D component which is the decongestant  You can take antihistamines without the decongestant or D component   Try to avoid medications such as pantoprazole or  Protonix/Nexium or Esomeprazole)/Prilosec or omeprazole/Prevacid or lansoprazole/AcipHex or Rabeprazole  If you are able to, use Pepcid as this is safer for your kidneys   Try to exercise at least 30 minutes 3 days a week to begin with with an ultimate goal of 5 days a week for at least 30 minutes     Try to lose 5-10 lb by your next visit     Please do not drink more than 2 glasses of alcohol/wine on a daily basis as this may contribute to your high blood pressure

## 2020-08-04 ENCOUNTER — TRANSCRIBE ORDERS (OUTPATIENT)
Dept: ADMINISTRATIVE | Facility: HOSPITAL | Age: 70
End: 2020-08-04

## 2020-08-04 DIAGNOSIS — T17.910A ASPIRATION OF GASTRIC CONTENTS, INITIAL ENCOUNTER: Primary | ICD-10-CM

## 2020-08-05 ENCOUNTER — DOCUMENTATION (OUTPATIENT)
Dept: PULMONOLOGY | Facility: CLINIC | Age: 70
End: 2020-08-05

## 2020-08-05 DIAGNOSIS — N18.30 CKD (CHRONIC KIDNEY DISEASE) STAGE 3, GFR 30-59 ML/MIN (HCC): ICD-10-CM

## 2020-08-05 DIAGNOSIS — I12.9 HYPERTENSIVE CHRONIC KIDNEY DISEASE WITH STAGE 1 THROUGH STAGE 4 CHRONIC KIDNEY DISEASE, OR UNSPECIFIED CHRONIC KIDNEY DISEASE: Primary | ICD-10-CM

## 2020-08-05 RX ORDER — TORSEMIDE 20 MG/1
20 TABLET ORAL DAILY
Qty: 90 TABLET | Refills: 3 | Status: CANCELLED | OUTPATIENT
Start: 2020-08-05

## 2020-08-05 RX ORDER — TORSEMIDE 20 MG/1
20 TABLET ORAL DAILY
Qty: 90 TABLET | Refills: 3 | Status: SHIPPED | OUTPATIENT
Start: 2020-08-05 | End: 2021-07-16

## 2020-08-06 DIAGNOSIS — Z99.89 OSA ON CPAP: ICD-10-CM

## 2020-08-06 DIAGNOSIS — I27.20 PULMONARY HYPERTENSION (HCC): Primary | ICD-10-CM

## 2020-08-06 DIAGNOSIS — G47.33 OSA ON CPAP: ICD-10-CM

## 2020-08-07 LAB
CREAT ?TM UR-SCNC: 74.5 UMOL/L
EXT PROTEIN URINE: 79.6
PROT/CREAT UR: 1.07 MG/G{CREAT}

## 2020-08-09 ENCOUNTER — ANESTHESIA EVENT (OUTPATIENT)
Dept: GASTROENTEROLOGY | Facility: HOSPITAL | Age: 70
End: 2020-08-09

## 2020-08-10 ENCOUNTER — ANESTHESIA (OUTPATIENT)
Dept: GASTROENTEROLOGY | Facility: HOSPITAL | Age: 70
End: 2020-08-10

## 2020-08-10 ENCOUNTER — HOSPITAL ENCOUNTER (OUTPATIENT)
Dept: GASTROENTEROLOGY | Facility: HOSPITAL | Age: 70
Setting detail: OUTPATIENT SURGERY
Discharge: HOME | End: 2020-08-10
Attending: INTERNAL MEDICINE
Payer: MEDICARE

## 2020-08-10 VITALS
RESPIRATION RATE: 16 BRPM | HEIGHT: 62 IN | WEIGHT: 162 LBS | OXYGEN SATURATION: 97 % | SYSTOLIC BLOOD PRESSURE: 151 MMHG | HEART RATE: 86 BPM | BODY MASS INDEX: 29.81 KG/M2 | TEMPERATURE: 98 F | DIASTOLIC BLOOD PRESSURE: 73 MMHG

## 2020-08-10 DIAGNOSIS — K21.9 GASTROESOPHAGEAL REFLUX DISEASE, ESOPHAGITIS PRESENCE NOT SPECIFIED: ICD-10-CM

## 2020-08-10 PROBLEM — I10 HTN (HYPERTENSION): Status: ACTIVE | Noted: 2020-08-10

## 2020-08-10 PROCEDURE — 43235 EGD DIAGNOSTIC BRUSH WASH: CPT | Performed by: INTERNAL MEDICINE

## 2020-08-10 RX ORDER — PROMETHAZINE HYDROCHLORIDE 25 MG/ML
12.5 INJECTION, SOLUTION INTRAMUSCULAR; INTRAVENOUS ONCE AS NEEDED
Status: DISCONTINUED | OUTPATIENT
Start: 2020-08-10 | End: 2020-08-14 | Stop reason: HOSPADM

## 2020-08-10 RX ORDER — ONDANSETRON 2 MG/ML
4 INJECTION INTRAMUSCULAR; INTRAVENOUS ONCE AS NEEDED
Status: DISCONTINUED | OUTPATIENT
Start: 2020-08-10 | End: 2020-08-14 | Stop reason: HOSPADM

## 2020-08-10 RX ORDER — MEPERIDINE HYDROCHLORIDE 25 MG/ML
12.5 INJECTION INTRAMUSCULAR; INTRAVENOUS; SUBCUTANEOUS ONCE
Status: DISCONTINUED | OUTPATIENT
Start: 2020-08-10 | End: 2020-08-14 | Stop reason: HOSPADM

## 2020-08-10 RX ORDER — GLYCOPYRROLATE 0.2 MG/ML
INJECTION INTRAMUSCULAR; INTRAVENOUS AS NEEDED
Status: DISCONTINUED | OUTPATIENT
Start: 2020-08-10 | End: 2020-08-11

## 2020-08-10 RX ORDER — LABETALOL 20 MG/4 ML (5 MG/ML) INTRAVENOUS SYRINGE
5
Status: DISCONTINUED | OUTPATIENT
Start: 2020-08-10 | End: 2020-08-14 | Stop reason: HOSPADM

## 2020-08-10 RX ORDER — ALBUTEROL SULFATE 2.5 MG/3ML
2.5 SOLUTION RESPIRATORY (INHALATION) ONCE AS NEEDED
Status: DISCONTINUED | OUTPATIENT
Start: 2020-08-10 | End: 2020-08-14 | Stop reason: HOSPADM

## 2020-08-10 RX ORDER — LIDOCAINE HYDROCHLORIDE 10 MG/ML
0.5 INJECTION, SOLUTION EPIDURAL; INFILTRATION; INTRACAUDAL; PERINEURAL ONCE AS NEEDED
Status: DISCONTINUED | OUTPATIENT
Start: 2020-08-10 | End: 2020-08-14 | Stop reason: HOSPADM

## 2020-08-10 RX ORDER — LIDOCAINE HYDROCHLORIDE 10 MG/ML
INJECTION, SOLUTION EPIDURAL; INFILTRATION; INTRACAUDAL; PERINEURAL AS NEEDED
Status: DISCONTINUED | OUTPATIENT
Start: 2020-08-10 | End: 2020-08-11

## 2020-08-10 RX ORDER — PROPOFOL 10 MG/ML
INJECTION, EMULSION INTRAVENOUS CONTINUOUS PRN
Status: DISCONTINUED | OUTPATIENT
Start: 2020-08-10 | End: 2020-08-11

## 2020-08-10 RX ORDER — HYDROMORPHONE HCL/PF 1 MG/ML
0.5 SYRINGE (ML) INJECTION
Status: DISCONTINUED | OUTPATIENT
Start: 2020-08-10 | End: 2020-08-14 | Stop reason: HOSPADM

## 2020-08-10 RX ORDER — PROPOFOL 10 MG/ML
INJECTION, EMULSION INTRAVENOUS AS NEEDED
Status: DISCONTINUED | OUTPATIENT
Start: 2020-08-10 | End: 2020-08-11

## 2020-08-10 RX ORDER — SODIUM CHLORIDE, SODIUM LACTATE, POTASSIUM CHLORIDE, CALCIUM CHLORIDE 600; 310; 30; 20 MG/100ML; MG/100ML; MG/100ML; MG/100ML
125 INJECTION, SOLUTION INTRAVENOUS CONTINUOUS
Status: DISCONTINUED | OUTPATIENT
Start: 2020-08-10 | End: 2020-08-14 | Stop reason: HOSPADM

## 2020-08-10 RX ORDER — FENTANYL CITRATE/PF 50 MCG/ML
25 SYRINGE (ML) INJECTION
Status: DISCONTINUED | OUTPATIENT
Start: 2020-08-10 | End: 2020-08-14 | Stop reason: HOSPADM

## 2020-08-10 RX ADMIN — GLYCOPYRROLATE 0.2 MG: 0.2 INJECTION, SOLUTION INTRAMUSCULAR; INTRAVENOUS at 08:12

## 2020-08-10 RX ADMIN — LIDOCAINE HYDROCHLORIDE 30 MG: 10 INJECTION, SOLUTION EPIDURAL; INFILTRATION; INTRACAUDAL; PERINEURAL at 08:12

## 2020-08-10 RX ADMIN — PROPOFOL 100 MG: 10 INJECTION, EMULSION INTRAVENOUS at 08:12

## 2020-08-10 RX ADMIN — SODIUM CHLORIDE, SODIUM LACTATE, POTASSIUM CHLORIDE, AND CALCIUM CHLORIDE: .6; .31; .03; .02 INJECTION, SOLUTION INTRAVENOUS at 08:00

## 2020-08-10 RX ADMIN — PROPOFOL 100 MCG/KG/MIN: 10 INJECTION, EMULSION INTRAVENOUS at 08:12

## 2020-08-10 NOTE — H&P
History and Physical -  [facility]  Aaron Godinez 69 y o  female MRN: [id number]                  HPI: Aaron Godinez is a 69y o  year old female who presents for EGD for GERD, 2 prior surgeries for reflux  REVIEW OF SYSTEMS: Per the HPI, and otherwise unremarkable  Historical Information   Past Medical History:   Diagnosis Date    Anxiety     Arthritis     Aspiration into airway     Diabetes mellitus ([facility] [address] [facility]     Family history of thyroid problem     Heart burn     Hyperplasia, parathyroid ([facility] [address] )     Hypertension     Kidney problem     Obesity (BMI 30 0-34  9)     Pneumonia     RLS (restless legs syndrome)     Seasonal allergies     Sleep apnea     uses CPAP    Swollen ankles      Past Surgical History:   Procedure Laterality Date    ARTHROSCOPY KNEE      BREAST BIOPSY      CHOLECYSTECTOMY      HIATAL HERNIA REPAIR      LIPOSUCTION      REDUCTION MAMMAPLASTY      SQUAMOUS CELL CARCINOMA EXCISION      TOE SURGERY      TONSILLECTOMY      TUBAL LIGATION       Social History   Social History     Substance and Sexual Activity   Alcohol Use Not Currently    Frequency: Monthly or less    Drinks per session: 1 or 2    Binge frequency: Never    Comment: rare     Social History     Substance and Sexual Activity   Drug Use No     Social History     Tobacco Use   Smoking Status Former Smoker    Packs/day: 2 00    Years: 10 00    Pack years: 20 00    Types: Cigarettes    Last attempt to quit: Osmin Nolasco Years since quittin 6   Smokeless Tobacco Never Used     Family History   Problem Relation Age of Onset    Heart disease Mother     Heart disease Father    Mavis Cousin Lung cancer Father 79        Smoker     Cancer Father         brain    Hypertension Sister     Diabetes Sister     Hypertension Brother     Diabetes Brother     Cancer Brother         esophageal cancer    Dementia Brother     Stroke Brother        Meds/Allergies     (Not in a hospital admission)      Allergies Allergen Reactions    Pollen Extract     Ciprofloxacin Hives       Objective     There were no vitals taken for this visit  PHYSICAL EXAM    Gen: NAD  CV: RRR  CHEST: Clear  ABD: soft, NT/ND  EXT: no edema      ASSESSMENT/PLAN:  This is a 69y o  year old female here for EGD for GERD, 2 prior surgeries for reflux, and she is stable and optimized for her procedure      [facility], MD

## 2020-08-10 NOTE — ANESTHESIA PREPROCEDURE EVALUATION
Procedure:  EGD    Relevant Problems   CARDIO   (+) HTN (hypertension)   (+) Hypertriglyceridemia   (+) Mixed hyperlipidemia      ENDO   (+) Type 2 diabetes mellitus with diabetic chronic kidney disease (HCC)      GI/HEPATIC   (+) GERD (gastroesophageal reflux disease)      /RENAL   (+) ANDRA (acute kidney injury) ([facility] [address] )   (+) Acute kidney injury superimposed on chronic kidney disease (HCC)   (+) Anemia of chronic renal failure, stage 3 (moderate) (HCC)   (+) CKD (chronic kidney disease) stage 3, GFR 30-59 ml/min (HCC)   (+) Diabetic nephropathy associated with type 2 diabetes mellitus (HCC)   (+) Hypertensive chronic kidney disease with stage 1 through stage 4 chronic kidney disease, or unspecified chronic kidney disease      HEMATOLOGY   (+) Anemia of chronic renal failure, stage 3 (moderate) (HCC)      NEURO/PSYCH   (+) Fibromyalgia      PULMONARY   (+) Acute respiratory failure with hypoxia (HCC)   (+) Dyspnea   (+) BARBARA on CPAP        Physical Exam    Airway    Mallampati score: II  TM Distance: >3 FB  Neck ROM: limited     Dental   No notable dental hx     Cardiovascular      Pulmonary      Other Findings        Anesthesia Plan  ASA Score- 3     Anesthesia Type- IV sedation with anesthesia with ASA Monitors  Additional Monitors:   Airway Plan:     Comment: I have seen the patient and reviewed the history  Patient to receive IV sedation with full ASA monitors  Risks discussed with the patient, consent signed          Plan Factors-Exercise tolerance (METS): <4 METS  Chart reviewed  EKG reviewed  Patient summary reviewed  Induction- intravenous  Postoperative Plan-     Informed Consent- Anesthetic plan and risks discussed with patient  I personally reviewed this patient with the CRNA  Discussed and agreed on the Anesthesia Plan with the CRNA  Talat Vaughn

## 2020-08-11 ENCOUNTER — TELEPHONE (OUTPATIENT)
Dept: NEPHROLOGY | Facility: CLINIC | Age: 70
End: 2020-08-11

## 2020-08-11 DIAGNOSIS — N18.30 CKD (CHRONIC KIDNEY DISEASE) STAGE 3, GFR 30-59 ML/MIN (HCC): ICD-10-CM

## 2020-08-11 DIAGNOSIS — R80.1 PERSISTENT PROTEINURIA: ICD-10-CM

## 2020-08-11 DIAGNOSIS — E11.21 DIABETIC NEPHROPATHY ASSOCIATED WITH TYPE 2 DIABETES MELLITUS (HCC): ICD-10-CM

## 2020-08-11 DIAGNOSIS — I12.9 HYPERTENSIVE CHRONIC KIDNEY DISEASE WITH STAGE 1 THROUGH STAGE 4 CHRONIC KIDNEY DISEASE, OR UNSPECIFIED CHRONIC KIDNEY DISEASE: Primary | ICD-10-CM

## 2020-08-11 NOTE — TELEPHONE ENCOUNTER
----- Message from Yumi Holley MD sent at 8/11/2020  9:31 AM EDT -----  The protein in the urine is better however I would like to make sure:  1  I will be ordering serologies 1 week after making the below medication change please see my orders these are nonfasting to be done now  2  I would increase olmesartan to 5 mg the morning and 10 mg in the evening  3  Repeat a basic metabolic profile when you do the serologies in 1 week    4   I would postpone her repeat blood pressure readings for another 3-4 weeks after making the above medication change and send those into the office

## 2020-08-12 ENCOUNTER — HOSPITAL ENCOUNTER (OUTPATIENT)
Dept: RADIOLOGY | Facility: HOSPITAL | Age: 70
Discharge: HOME | End: 2020-08-12
Payer: MEDICARE

## 2020-08-12 DIAGNOSIS — T17.910A ASPIRATION OF GASTRIC CONTENTS, INITIAL ENCOUNTER: ICD-10-CM

## 2020-08-12 PROCEDURE — 92611 MOTION FLUOROSCOPY/SWALLOW: CPT

## 2020-08-12 PROCEDURE — 74230 X-RAY XM SWLNG FUNCJ C+: CPT

## 2020-08-12 NOTE — PROCEDURES
Video Swallow Study      Patient Name: Karla George  FQSMT'J Date: 8/12/2020        Past Medical History  Past Medical History:   Diagnosis Date    Anxiety     Arthritis     Aspiration into airway     Diabetes mellitus ([address]     Family history of thyroid problem     Heart burn     Hyperplasia, parathyroid ([address]     Hypertension     Kidney problem     Obesity (BMI 30 0-34  9)     Pneumonia     RLS (restless legs syndrome)     Seasonal allergies     Sleep apnea     uses CPAP    Swollen ankles         Past Surgical History  Past Surgical History:   Procedure Laterality Date    ARTHROSCOPY KNEE      BREAST BIOPSY      CHOLECYSTECTOMY      HIATAL HERNIA REPAIR      LIPOSUCTION      REDUCTION MAMMAPLASTY      SQUAMOUS CELL CARCINOMA EXCISION      TOE SURGERY      TONSILLECTOMY      TUBAL LIGATION           General Information:    70 yo female referred to [facility]  for a VBS by Dr Harvey Callaway to assess swallowing, ? Aspiration w/ thin liquids  Pt stated she developed a cough prior to vacation in [facility], she was prescribed Robitussin w/ codeine  One morning pt's  found her w/ covered in vomit and unresponsive  Pt was taken to hospital and intubated for 1 week for aspiration  Pt stated she had an EGD 8/10/20 and a diverticulum was found  Per EGD report, Single large diverticulum was noted in lower third of esophagus  Cognition:  WFL stated she feels "loopy" today, but able to engage in conversation and provide hx  Speech/Swallow Mech: Oral motor movements appeared  WNL; Dentition was  natural; Voice noted to be hoarse  Respiratory Status: WFL on RA- pt stated she was recently weaned off O2;   Current diet: Regular w/ NTL, ok for thin liquids between meals  Prior VBS completed while hospitalized in [facility], per pt report, VBS showed silent aspiration       Pt was seen in radiology for a Video Barium Swallow Study, pt stood and was viewed in the lateral and A/P position with the following consistencies: puree, soft/solid, hard solid, HTL, NTL, and thin liquids, barium pill w/ water  Results are as follows:     **Images are available for review on PACS          Oral Stage: WNL   Mastication, manipulation, and transfer of all consistencies appeared WNL  Oral residue noted w/ food consistencies, which cleared w/ liquids  Pharyngeal Stage: WNL   swallow initiation was timely w/ complete epiglottic inversion and airway closure  No pharyngeal retention, laryngeal penetration or aspiration observed  Barium pill passed through the pharynx w/o incident  Esophageal Stage:   briefly assessed, mild narrowing of cervical esophagus noted w/ food residue noted in UES which cleared w/ liquids  No Zenker's diverticulum noted  All materials emptied into the esophagus w/o difficulty          Assessment Summary:   Oral and pharyngeal stages of swallowing appeared WNL  Esophageal dysphagia due to known hx of GERD and diverticulum noted in lower third of esophagus per EGD    Diagnosis/Prognosis:            Recommendations:   Regular diet w/ thin liquids  meds as tolerated  Reflux precautions    April Claudio Siu MA CCC-SLP  Speech Patholgist  [place] license # [id number]  [place] license # [id number]  Available via [employer]

## 2020-08-12 NOTE — PROCEDURES
Video Swallow Study      Patient Name: Radha Claros  VGIQP'Y Date: 8/12/2020        Past Medical History  Past Medical History:   Diagnosis Date    Anxiety     Arthritis     Aspiration into airway     Diabetes mellitus ([facility]     Family history of thyroid problem     Heart burn     Hyperplasia, parathyroid ([facility]     Hypertension     Kidney problem     Obesity (BMI 30 0-34  9)     Pneumonia     RLS (restless legs syndrome)     Seasonal allergies     Sleep apnea     uses CPAP    Swollen ankles         Past Surgical History  Past Surgical History:   Procedure Laterality Date    ARTHROSCOPY KNEE      BREAST BIOPSY      CHOLECYSTECTOMY      HIATAL HERNIA REPAIR      LIPOSUCTION      REDUCTION MAMMAPLASTY      SQUAMOUS CELL CARCINOMA EXCISION      TOE SURGERY      TONSILLECTOMY      TUBAL LIGATION           General Information:         Oral Stage:                Pharyngeal Stage:                     Esophageal Stage:          Assessment Summary:       Diagnosis/Prognosis:            Recommendations:

## 2020-08-12 NOTE — TELEPHONE ENCOUNTER
I spoke to the patient she is aware of changes, she requested bp log be mailed and note with changes

## 2020-08-19 LAB — HCV AB SER-ACNC: NON REACTIVE

## 2020-08-26 ENCOUNTER — CLINICAL SUPPORT (OUTPATIENT)
Dept: PULMONOLOGY | Facility: CLINIC | Age: 70
End: 2020-08-26
Payer: MEDICARE

## 2020-08-26 DIAGNOSIS — I27.20 PULMONARY HYPERTENSION (HCC): ICD-10-CM

## 2020-08-26 DIAGNOSIS — G47.33 OSA ON CPAP: ICD-10-CM

## 2020-08-26 DIAGNOSIS — N18.30 CHRONIC KIDNEY DISEASE, STAGE III (MODERATE) (HCC): ICD-10-CM

## 2020-08-26 DIAGNOSIS — Z99.89 OSA ON CPAP: ICD-10-CM

## 2020-08-26 DIAGNOSIS — I10 ESSENTIAL HYPERTENSION: ICD-10-CM

## 2020-08-26 RX ORDER — OLMESARTAN MEDOXOMIL 5 MG/1
TABLET ORAL
Qty: 270 TABLET | Refills: 3 | OUTPATIENT
Start: 2020-08-26

## 2020-08-26 RX ORDER — OLMESARTAN MEDOXOMIL 5 MG/1
10 TABLET ORAL DAILY
Qty: 270 TABLET | Refills: 3 | Status: SHIPPED | OUTPATIENT
Start: 2020-08-26 | End: 2020-12-01

## 2020-08-26 RX ORDER — OLMESARTAN MEDOXOMIL 5 MG/1
10 TABLET ORAL DAILY
Qty: 270 TABLET | Refills: 3 | Status: SHIPPED | OUTPATIENT
Start: 2020-08-26 | End: 2020-08-26

## 2020-08-26 NOTE — TELEPHONE ENCOUNTER
----- Message from Nando Maldonado MD sent at 8/26/2020  4:06 PM EDT -----  Let the patient know most of her lab work came back quite good

## 2020-08-26 NOTE — TELEPHONE ENCOUNTER
As a wrote after reviewing the labs there are no obvious abnormalities and her kidney function remains stable

## 2020-08-28 NOTE — PROGRESS NOTES
Pulmonary Rehabilitation Plan of Care   Care Plan       Today's date: 2020   Visits: initial evaluation  Patient name: Karla George      : 1950  Age: 69 y o  MRN: [id number]  Referring Physician: Deng Mccarty,*  Pulmonologist: same  Provider: [facility] [place]  Clinician: Callie Olsen, RRT    Dx:   Encounter Diagnoses   Name Primary?  Pulmonary hypertension ([address] )     BARBARA on CPAP      Date of onset: 2020      SUMMARY OF PROGRESS:  Initial evaluation for pulmonary rehab  Patient had episode of aspiration and subsequent pneumonia in ,  resulting in respiratory failure requiring mechanical ventilation  She wears oxygen intermittently during the day when she feels sob  She has mild BARBARA and uses CPAP HS with 2 lpm oxygen  Pt has remote smoking hx of 20 pack years quitting 45 year ago  She has diabetes and c/o neuropathy in her feet  She is diagnosed with stage 3 kidney disease  She would like to be able to keep up with her family on walks, get off the oxygen and improve er upper body strength  She walked 900 ft for the 6 MWT with a low SpO2 of 85% on room air   She is a retired respiratory therapist       Medication compliance: Yes   Comments:   Fall Risk: moderate    Comments: neuropathy    Oxygen Needs: titrate > 90% stauration    Oxygen Goal: Maintain SpO2>90% during exercise  Plan: Titrate supplemental oxygen as needed to maintain SpO2>90% with exercise  CAT: 23  Shortness of breath questionnaire: 35  Progressing:    Readiness to change: Preparation:  (Getting ready to change)     EXERCISE ASSESSMENT and PLAN      Exercise Progression 30 Day Goals :    Frequency: 4 days/week        Minutes: 50         METS:               SpO2: > 90%              RPD: 4-6                      HR:    RPE: 4-6        Modalities: Treadmill, Airdyne bike, UBE, NuStep and Recumbent bike     Strength trainin-3 days / week  12-15 repetitions  1-2 sets per modality    Modalities: Leg Press, Chest Press, Pull Downs, Lateral Raise, Arm Extension, Arm Curl, Seated Row and Sit to Stands    Progressing:      Home Exercise: Type: walk    Goals: Reduced Dyspnea with physical activity  0-1/10 and Improved 6MWT distance by 10%  Education: home exercise guidelines and Exercise instructions/guidelines for discharge    Plan:home exercise 30+ mins 2 days opposite CR  Readiness to change: Preparation:  (Getting ready to change)       NUTRITION ASSESSMENT AND PLAN    Weight control:    Starting weight: 160   Current weight:     Waist circumference:    Starting:    Current:    Diabetes: patient has monitor to measure ad hajreet  Lipid management:   Goals:reduced BMI to < 25  Education: heart healthy eating  low sodium diet  hydration  diabetes management and exercise  Progressing:  Plan: increase fruits/vegs and reduce red meat 1x/wk  Readiness to change: Contemplation:  (Acknowledging that there is a problem but not yet ready or sure of wanting to make a change)      PSYCHOSOCIAL ASSESSMENT AND PLAN    Emotional:  Depression assessment:  PHQ-9 = 15-19 = Moderately Severe Depression            Anxiety measure:  JIM-7 = 5-9 = Mild anxiety  Self-reported stress level:    Social support: Very Good  Goals:  Feelings in [place] Score < 3, Physical Fitness in [place] Score < 3, Daily Activity in [place] Score < 3, Social Activities in [place] Score < 3, Pain in [place] Score < 3, Overall Health in [place] Score < 3 and Quality of Life in [facility] Score < 3   Education: signs/sxs of depression  Progressing:  Plan: PHQ-9 >5 will refer to MD and Refer to [address] to change:       OTHER CORE COMPONENTS     Tobacco:   Social History     Tobacco Use   Smoking Status Former Smoker    Packs/day: 2 00    Years: 10 00    Pack years: 20 00    Types: Cigarettes    Last attempt to quit: Hansa Blanc Years since quittin 6   Smokeless Tobacco Never Used       Tobacco Use Intervention: Referral to tobacco expert:   N/A: Pt has a remote history of smoking    Blood pressure:    Restin/80   Exercise:     Goals: consistent exercise >150 mins/wk  Education:    Progressing:  Plan:   Readiness to change:

## 2020-08-31 ENCOUNTER — CLINICAL SUPPORT (OUTPATIENT)
Dept: PULMONOLOGY | Facility: CLINIC | Age: 70
End: 2020-08-31
Payer: MEDICARE

## 2020-08-31 DIAGNOSIS — J96.01 ACUTE RESPIRATORY FAILURE WITH HYPOXIA (HCC): ICD-10-CM

## 2020-08-31 PROCEDURE — G0239 OTH RESP PROC, GROUP: HCPCS

## 2020-08-31 NOTE — PROGRESS NOTES
Edel Boss        Dear Dr Aretha Bajwa,    Emotional well-being and depression is addressed in the Pulmonary rehab evaluation  To assess the severity of depression, patients are given the PHQ-9 Depression Questionnaire  This is to inform you that your patient Jocelyne Way (1950) scored a 16 which is interpreted as 15-19 = Moderately Severe Depression  Your patient was provided contact information for [facility]  A repeat PHQ -9 will be administered in 30 days to assess improvement and/or need to explore other mental health resources  Thank you for your continued support of cardiac rehabilitation       Sincerely,      Lily Casillas, RRT

## 2020-08-31 NOTE — PROGRESS NOTES
PULMONARY REHAB ASSESSMENT    Today's date: 2020  Patient name: Margaret Francis     : 1950       MRN: [id number]  PCP: Zahira Hatch DO  Referring Physician: Valentin Mcnally,*  Pulmonologist: same    Dx: pulmonary hypertension      Date of onset: 2020  Cultural needs:     Weight:    Wt Readings from Last 1 Encounters:   08/10/20 73 5 kg (162 lb)      Height:   Ht Readings from Last 1 Encounters:   08/10/20 5' 2" (1 575 m)     Medical History:   Past Medical History:   Diagnosis Date    Anxiety     Arthritis     Aspiration into airway     Diabetes mellitus ([facility]     Family history of thyroid problem     Heart burn     Hyperplasia, parathyroid ([facility] )     Hypertension     Kidney problem     Obesity (BMI 30 0-34  9)     Pneumonia     RLS (restless legs syndrome)     Seasonal allergies     Sleep apnea     uses CPAP    Swollen ankles          Physical Limitations:     Oxygen needs:titrate SpO2 > 90 %    Risk Factors   Cholesterol: No  Smoking: Former user  HTN: Yes  DM: Type 2   Obesity: Yes   Inactivity: Yes  Stress:  perceived  stress: /10   Stressors:   Goals for Stress Management:    Family History:  Family History   Problem Relation Age of Onset    Heart disease Mother     Heart disease Father     Lung cancer Father 79        Smoker     Cancer Father         brain    Hypertension Sister     Diabetes Sister     Hypertension Brother     Diabetes Brother     Cancer Brother         esophageal cancer    Dementia Brother     Stroke Brother        Allergies: Pollen extract and Ciprofloxacin  ETOH:   Social History     Substance and Sexual Activity   Alcohol Use Not Currently    Frequency: Monthly or less    Drinks per session: 1 or 2    Binge frequency: Never    Comment: rare         Current Medications:   Current Outpatient Medications   Medication Sig Dispense Refill    albuterol (Ventolin HFA) 90 mcg/act inhaler Inhale 2 puffs every 6 (six) hours as needed for wheezing or shortness of breath  0    amLODIPine (NORVASC) 5 mg tablet Take 1 tablet (5 mg total) by mouth 2 (two) times a day (Patient taking differently: Take 5 mg by mouth daily at bedtime ) 180 tablet 3    b complex vitamins tablet Take 1 tablet by mouth daily       B-D ULTRAFINE III SHORT PEN 31G X 8 MM MISC USE AS DIRECTED 4 TIMES PER DAY  3    Calcium Carbonate (CALCIUM 600 PO) Take 1 capsule by mouth 2 (two) times a day      calcium carbonate (TUMS) 500 mg chewable tablet Chew 2 tablets (1,000 mg total) daily as needed for indigestion or heartburn  0    DULoxetine (CYMBALTA) 60 mg delayed release capsule Take 90 mg by mouth daily       famotidine (PEPCID) 40 MG tablet TAKE 1 TABLET BY MOUTH EVERY DAY 30 tablet 3    fluticasone (FLONASE) 50 mcg/act nasal spray 1-2 sprays daily      gabapentin (NEURONTIN) 300 mg capsule 300 mg 2 (two) times a day       Icosapent Ethyl (Vascepa) 1 g CAPS Take 1 g by mouth 2 (two) times a day      insulin aspart (NovoLOG) 100 units/mL injection Inject under the skin 3 (three) times a day before meals      insulin detemir (LEVEMIR) 100 units/mL subcutaneous injection Inject 15 Units under the skin every morning (Patient taking differently: Inject 15 Units under the skin every morning )  0    levothyroxine 175 mcg tablet Take 175 mcg by mouth daily      Magnesium 500 MG CAPS Take 1 capsule by mouth 2 (two) times a day      metoprolol tartrate (LOPRESSOR) 50 mg tablet Take 50 mg by mouth every 12 (twelve) hours        olmesartan (BENICAR) 5 mg tablet TAKE 2 TABLETS (10 MG TOTAL) BY MOUTH DAILY TAKE 1 TABLET OR 5 MG IN THE MORNING TAKE 2 TABLETS OR 10 MG IN THE EVENING 270 tablet 3    pantoprazole (PROTONIX) 40 mg tablet TAKE 1 TABLET BY MOUTH EVERY DAY 30 tablet 3    pramipexole (MIRAPEX) 0 25 mg tablet Take 0 25 mg by mouth daily      tiotropium (SPIRIVA) 18 mcg inhalation capsule Place 18 mcg into inhaler and inhale daily      torsemide (DEMADEX) 20 mg tablet Take 1 tablet (20 mg total) by mouth daily 90 tablet 3    Vitamin D, Ergocalciferol, 2000 units CAPS Take 2,000 Units by mouth daily        No current facility-administered medications for this visit  Current Functional Status  Occupation: retired  Recreation: walking, fishing  ADLs:Capable of performing light to moderate ADLs  [place]: Capable of performing light to moderate ADLs  Exercise: walking dog  Other:     Patient Specific Goals:  Improve upper body strength; get off oxygen    Short Term Program Goals: improved energy/stamina with ADLs    Long Term Goals: Improved Quality of Life - [facility] score reduced    Oxygen Goals: Maintain SpO2>90% titrating supplemental oxygen as needed     Ability to reach goals/rehabilitation potential:  Very Good     Projected return to function: 12 weeks  Objective tests: 6 MWT      Nutritional   Reviewed details of Rate your Plate  Discussed key elements of heart healthy eating  Reviewed patient goals for dietary modifications and their clinical implications  Reviewed most recent lipid profile       Goals for dietary modification: increase fish intake  eliminate butter      Emotional/Social  Patient has a history of depression  Gave [employer] information        [address]    Marital status:     Rate 1-5:    Marriage:    Family: 3   Financial: 4   Relationships: 5   Spirituality: 5   Intellectual: 5      Domestic Violence Screening: No    Comments:

## 2020-09-02 ENCOUNTER — CLINICAL SUPPORT (OUTPATIENT)
Dept: PULMONOLOGY | Facility: CLINIC | Age: 70
End: 2020-09-02
Payer: MEDICARE

## 2020-09-02 DIAGNOSIS — I27.20 PULMONARY HYPERTENSION (HCC): ICD-10-CM

## 2020-09-02 PROCEDURE — G0239 OTH RESP PROC, GROUP: HCPCS

## 2020-09-09 ENCOUNTER — CLINICAL SUPPORT (OUTPATIENT)
Dept: PULMONOLOGY | Facility: CLINIC | Age: 70
End: 2020-09-09
Payer: MEDICARE

## 2020-09-09 DIAGNOSIS — I27.20 PULMONARY HYPERTENSION (HCC): ICD-10-CM

## 2020-09-09 PROCEDURE — G0239 OTH RESP PROC, GROUP: HCPCS

## 2020-09-11 ENCOUNTER — CLINICAL SUPPORT (OUTPATIENT)
Dept: PULMONOLOGY | Facility: CLINIC | Age: 70
End: 2020-09-11
Payer: MEDICARE

## 2020-09-11 ENCOUNTER — TELEPHONE (OUTPATIENT)
Dept: GASTROENTEROLOGY | Facility: AMBULARY SURGERY CENTER | Age: 70
End: 2020-09-11

## 2020-09-11 DIAGNOSIS — R11.2 NAUSEA AND VOMITING, INTRACTABILITY OF VOMITING NOT SPECIFIED, UNSPECIFIED VOMITING TYPE: Primary | ICD-10-CM

## 2020-09-11 DIAGNOSIS — I27.20 PULMONARY HYPERTENSION (HCC): ICD-10-CM

## 2020-09-11 PROCEDURE — G0239 OTH RESP PROC, GROUP: HCPCS

## 2020-09-11 NOTE — TELEPHONE ENCOUNTER
Patients GI provider:  Dr Rola Campos    Number to return call: ([phone]    Reason for call: Pt calling she was told she needs to get a gastric emptying scan done but no one ever called her to get it done     Scheduled procedure/appointment date if applicable: 5/30/52

## 2020-09-14 ENCOUNTER — CLINICAL SUPPORT (OUTPATIENT)
Dept: PULMONOLOGY | Facility: CLINIC | Age: 70
End: 2020-09-14
Payer: MEDICARE

## 2020-09-14 DIAGNOSIS — I27.20 PULMONARY HYPERTENSION (HCC): ICD-10-CM

## 2020-09-14 PROCEDURE — G0239 OTH RESP PROC, GROUP: HCPCS

## 2020-09-16 ENCOUNTER — CLINICAL SUPPORT (OUTPATIENT)
Dept: PULMONOLOGY | Facility: CLINIC | Age: 70
End: 2020-09-16
Payer: MEDICARE

## 2020-09-16 ENCOUNTER — DOCUMENTATION (OUTPATIENT)
Dept: NEPHROLOGY | Facility: CLINIC | Age: 70
End: 2020-09-16

## 2020-09-16 DIAGNOSIS — I27.20 PULMONARY HYPERTENSION (HCC): ICD-10-CM

## 2020-09-16 DIAGNOSIS — I10 ESSENTIAL HYPERTENSION: ICD-10-CM

## 2020-09-16 DIAGNOSIS — N18.30 CKD (CHRONIC KIDNEY DISEASE) STAGE 3, GFR 30-59 ML/MIN (HCC): ICD-10-CM

## 2020-09-16 DIAGNOSIS — I12.9 HYPERTENSIVE CHRONIC KIDNEY DISEASE WITH STAGE 1 THROUGH STAGE 4 CHRONIC KIDNEY DISEASE, OR UNSPECIFIED CHRONIC KIDNEY DISEASE: ICD-10-CM

## 2020-09-16 DIAGNOSIS — I27.20 PULMONARY HYPERTENSION (HCC): Primary | ICD-10-CM

## 2020-09-16 PROCEDURE — G0239 OTH RESP PROC, GROUP: HCPCS

## 2020-09-16 NOTE — PROGRESS NOTES
Home blood pressure readings:  -a m :  139/90, standing 131/89  -p m :  133/84, standing 129/83  Heart rate 70-80    Recommendations:  1  Increase olmesartan to 20 mg daily given elevated blood pressure and proteinuria  2  Repeat a basic metabolic profile nonfasting 1-2 weeks after making the change  3   Please wait 4 weeks after making the medication change and take an additional week a blood pressure readings morning evening, sitting and standing and send those into the office as follows:  · [address] ARE RELAXED FOR SEVERAL MINUTES  · TAKE THE EVENING READINGS BEFORE SUPPER/DINNER AND BEFORE ANY MEDICATIONS AND AGAIN WHEN YOU ARE RELAX FOR SEVERAL MINUTES  · PLEASE INCLUDE HEART RATE WITH YOUR BLOOD PRESSURE READINGS  · When taking standing readings, keep your arm supported at heart level and not dangling  · Make sure you are sitting with your back supported and feet on the ground and do not cross your legs or feet  · Make sure you have not taken any coffee or caffeine products or exercised or smoke cigarettes at least 30 minutes before taking your blood pressure    Thanks

## 2020-09-16 NOTE — PROGRESS NOTES
I spoke to the patient she is aware to  Increase her omlesartan to 20 mg daily now  - repeat labs due in 2 weeks mailed out/  - BP log in 4-6 weeks

## 2020-09-21 ENCOUNTER — CLINICAL SUPPORT (OUTPATIENT)
Dept: PULMONOLOGY | Facility: CLINIC | Age: 70
End: 2020-09-21
Payer: MEDICARE

## 2020-09-21 DIAGNOSIS — I27.20 PULMONARY HYPERTENSION (HCC): ICD-10-CM

## 2020-09-21 PROCEDURE — G0239 OTH RESP PROC, GROUP: HCPCS

## 2020-09-23 ENCOUNTER — CLINICAL SUPPORT (OUTPATIENT)
Dept: PULMONOLOGY | Facility: CLINIC | Age: 70
End: 2020-09-23
Payer: MEDICARE

## 2020-09-23 DIAGNOSIS — I27.20 PULMONARY HYPERTENSION (HCC): ICD-10-CM

## 2020-09-23 PROCEDURE — G0239 OTH RESP PROC, GROUP: HCPCS

## 2020-09-25 LAB
LEFT EYE DIABETIC RETINOPATHY: NORMAL
RIGHT EYE DIABETIC RETINOPATHY: NORMAL

## 2020-09-28 ENCOUNTER — CLINICAL SUPPORT (OUTPATIENT)
Dept: PULMONOLOGY | Facility: CLINIC | Age: 70
End: 2020-09-28
Payer: MEDICARE

## 2020-09-28 DIAGNOSIS — I27.20 PULMONARY HYPERTENSION (HCC): ICD-10-CM

## 2020-09-28 PROCEDURE — G0239 OTH RESP PROC, GROUP: HCPCS

## 2020-09-28 NOTE — PROGRESS NOTES
Pulmonary Rehabilitation Plan of Care   30 day       Today's date: 2020   Visits: initial evaluation  Patient name: Jocelyne Way      : 1950  Age: 69 y o  MRN: [id number]  Referring Physician: Shelley Landin,*  Pulmonologist: same  Provider: Zenobia Hsieh  Clinician: Lily Casillas, RRT    Dx:   Encounter Diagnosis   Name Primary?  Pulmonary hypertension ([facility] )      Date of onset: 2020      SUMMARY OF PROGRESS:  Janel Griggs has completed 9 sessions of pulmonary rehab  She walks on the treadmill at 1 5 mph for 15 minutes, and the Nustep for 15 minutes at level 3  She lifts some light free weights and uses the weight machines as below  She exercises on room air and maintains  An oxygen saturation > 88% on room air        Medication compliance: Yes   Comments:   Fall Risk: moderate    Comments: neuropathy    Oxygen Needs: titrate > 90% saturation    Oxygen Goal: Maintain SpO2>90% during exercise  Plan: Titrate supplemental oxygen as needed to maintain SpO2>90% with exercise  CAT: 23  Shortness of breath questionnaire: 35  Progressing:    Readiness to change: Preparation:  (Getting ready to change)     EXERCISE ASSESSMENT and PLAN      Exercise Progression 30 Day Goals :    Frequency: 4 days/week        Minutes: 50         METS:               SpO2: > 90%              RPD: 4-6                      HR:    RPE: 4-6        Modalities: Treadmill, Airdyne bike, UBE, NuStep and Recumbent bike     Strength trainin-3 days / week  12-15 repetitions  1-2 sets per modality    Modalities: Leg Press, Chest Press, Pull Downs, Lateral Raise, Arm Extension, Arm Curl, Seated Row and Sit to [employer]    Progressing:      Home Exercise: Type: walk    Goals: Reduced Dyspnea with physical activity  0-1/10 and Improved 6MWT distance by 10%  Education: home exercise guidelines and Exercise instructions/guidelines for discharge    Plan:home exercise 30+ mins 2 days opposite CR  Readiness to change: Preparation:  (Getting ready to change)       NUTRITION ASSESSMENT AND PLAN    Weight control:    Starting weight: 160   Current weight:     Waist circumference:    Starting:    Current:    Diabetes: patient has monitor to measure ad harjeet  Lipid management:   Goals:reduced BMI to < 25  Education: heart healthy eating  low sodium diet  hydration  diabetes management and exercise  Progressing:  Plan: increase fruits/vegs and reduce red meat 1x/wk  Readiness to change: Contemplation:  (Acknowledging that there is a problem but not yet ready or sure of wanting to make a change)      PSYCHOSOCIAL ASSESSMENT AND PLAN    Emotional:  Depression assessment:  PHQ-9 = 15-19 = Moderately Severe Depression            Anxiety measure:  JIM-7 = 5-9 = Mild anxiety  Self-reported stress level:    Social support: Very Good  Goals:  Feelings in [place] Score < 3, Physical Fitness in [place] Score < 3, Daily Activity in [place] Score < 3, Social Activities in [place] Score < 3, Pain in [place] Score < 3, Overall Health in [place] Score < 3 and Quality of Life in [place] Score < 3   Education: signs/sxs of depression  Progressing:  Plan: PHQ-9 >5 will refer to MD and Refer to [employer] to change:       OTHER CORE COMPONENTS     Tobacco:   Social History     Tobacco Use   Smoking Status Former Smoker    Packs/day: 2 00    Years: 10 00    Pack years: 20 00    Types: Cigarettes    Last attempt to quit: Cady Caro Years since quittin 7   Smokeless Tobacco Never Used       Tobacco Use Intervention: Referral to tobacco expert:   N/A: Pt has a remote history of smoking    Blood pressure:    Restin/80   Exercise:     Goals: consistent exercise >150 mins/wk  Education:    Progressing:  Plan:   Readiness to change:

## 2020-09-29 ENCOUNTER — OFFICE VISIT (OUTPATIENT)
Dept: FAMILY MEDICINE CLINIC | Facility: CLINIC | Age: 70
End: 2020-09-29
Payer: MEDICARE

## 2020-09-29 VITALS
TEMPERATURE: 98.2 F | HEIGHT: 63 IN | HEART RATE: 76 BPM | SYSTOLIC BLOOD PRESSURE: 130 MMHG | BODY MASS INDEX: 29.06 KG/M2 | DIASTOLIC BLOOD PRESSURE: 76 MMHG | RESPIRATION RATE: 16 BRPM | WEIGHT: 164 LBS | OXYGEN SATURATION: 95 %

## 2020-09-29 DIAGNOSIS — Z12.31 VISIT FOR SCREENING MAMMOGRAM: Primary | ICD-10-CM

## 2020-09-29 DIAGNOSIS — N18.30 CHRONIC KIDNEY DISEASE, STAGE III (MODERATE) (HCC): ICD-10-CM

## 2020-09-29 DIAGNOSIS — I12.9 HYPERTENSIVE CHRONIC KIDNEY DISEASE WITH STAGE 1 THROUGH STAGE 4 CHRONIC KIDNEY DISEASE, OR UNSPECIFIED CHRONIC KIDNEY DISEASE: ICD-10-CM

## 2020-09-29 DIAGNOSIS — Z23 NEED FOR VACCINATION: ICD-10-CM

## 2020-09-29 DIAGNOSIS — I10 ESSENTIAL HYPERTENSION: ICD-10-CM

## 2020-09-29 DIAGNOSIS — R41.3 IMPAIRED MEMORY: ICD-10-CM

## 2020-09-29 DIAGNOSIS — R25.1 TREMOR OF LEFT HAND: ICD-10-CM

## 2020-09-29 DIAGNOSIS — R26.81 GAIT INSTABILITY: ICD-10-CM

## 2020-09-29 DIAGNOSIS — E11.21 DIABETIC NEPHROPATHY ASSOCIATED WITH TYPE 2 DIABETES MELLITUS (HCC): ICD-10-CM

## 2020-09-29 PROBLEM — D72.829 LEUKOCYTOSIS: Status: RESOLVED | Noted: 2020-07-14 | Resolved: 2020-09-29

## 2020-09-29 PROBLEM — J96.01 ACUTE RESPIRATORY FAILURE WITH HYPOXIA (HCC): Status: RESOLVED | Noted: 2020-05-02 | Resolved: 2020-09-29

## 2020-09-29 PROBLEM — R39.15 URINARY URGENCY: Status: ACTIVE | Noted: 2020-09-29

## 2020-09-29 PROBLEM — N17.9 AKI (ACUTE KIDNEY INJURY) (HCC): Status: RESOLVED | Noted: 2020-05-08 | Resolved: 2020-09-29

## 2020-09-29 PROBLEM — Z87.442 HISTORY OF KIDNEY STONES: Status: ACTIVE | Noted: 2020-09-29

## 2020-09-29 PROCEDURE — G0008 ADMIN INFLUENZA VIRUS VAC: HCPCS

## 2020-09-29 PROCEDURE — 99204 OFFICE O/P NEW MOD 45 MIN: CPT

## 2020-09-29 PROCEDURE — 90662 IIV NO PRSV INCREASED AG IM: CPT

## 2020-09-29 RX ORDER — ZOSTER VACCINE RECOMBINANT, ADJUVANTED 50 MCG/0.5
0.5 KIT INTRAMUSCULAR ONCE
Qty: 1 EACH | Refills: 1 | Status: SHIPPED | OUTPATIENT
Start: 2020-09-29 | End: 2020-09-29

## 2020-09-29 RX ORDER — AMLODIPINE BESYLATE 5 MG/1
5 TABLET ORAL DAILY
Start: 2020-09-29 | End: 2021-03-29

## 2020-09-29 RX ORDER — MIRABEGRON 50 MG/1
50 TABLET, FILM COATED, EXTENDED RELEASE ORAL DAILY
COMMUNITY
Start: 2020-09-25 | End: 2020-10-22 | Stop reason: ALTCHOICE

## 2020-09-30 ENCOUNTER — CLINICAL SUPPORT (OUTPATIENT)
Dept: PULMONOLOGY | Facility: CLINIC | Age: 70
End: 2020-09-30
Payer: MEDICARE

## 2020-09-30 DIAGNOSIS — I27.20 PULMONARY HYPERTENSION (HCC): ICD-10-CM

## 2020-09-30 PROCEDURE — G0239 OTH RESP PROC, GROUP: HCPCS

## 2020-10-01 LAB
CREAT ?TM UR-SCNC: 45.6 UMOL/L
EXT MICROALBUMIN URINE RANDOM: 105
HBA1C MFR BLD HPLC: 8 %
MICROALBUMIN/CREAT UR: 2302.6 MG/G{CREAT}

## 2020-10-02 PROBLEM — E78.1 HYPERTRIGLYCERIDEMIA: Status: RESOLVED | Noted: 2020-07-14 | Resolved: 2020-10-02

## 2020-10-02 PROBLEM — N18.9 ACUTE KIDNEY INJURY SUPERIMPOSED ON CHRONIC KIDNEY DISEASE (HCC): Status: RESOLVED | Noted: 2018-09-19 | Resolved: 2020-10-02

## 2020-10-02 PROBLEM — E78.2 MIXED HYPERLIPIDEMIA: Status: RESOLVED | Noted: 2019-09-20 | Resolved: 2020-10-02

## 2020-10-02 PROBLEM — N17.9 ACUTE KIDNEY INJURY SUPERIMPOSED ON CHRONIC KIDNEY DISEASE: Status: RESOLVED | Noted: 2018-09-19 | Resolved: 2020-10-02

## 2020-10-02 PROBLEM — N18.9 ACUTE KIDNEY INJURY SUPERIMPOSED ON CHRONIC KIDNEY DISEASE: Status: RESOLVED | Noted: 2018-09-19 | Resolved: 2020-10-02

## 2020-10-02 PROBLEM — N17.9 ACUTE KIDNEY INJURY SUPERIMPOSED ON CHRONIC KIDNEY DISEASE (HCC): Status: RESOLVED | Noted: 2018-09-19 | Resolved: 2020-10-02

## 2020-10-05 ENCOUNTER — CLINICAL SUPPORT (OUTPATIENT)
Dept: PULMONOLOGY | Facility: CLINIC | Age: 70
End: 2020-10-05
Payer: MEDICARE

## 2020-10-05 DIAGNOSIS — G25.81 RLS (RESTLESS LEGS SYNDROME): ICD-10-CM

## 2020-10-05 PROCEDURE — G0239 OTH RESP PROC, GROUP: HCPCS

## 2020-10-07 ENCOUNTER — CLINICAL SUPPORT (OUTPATIENT)
Dept: PULMONOLOGY | Facility: CLINIC | Age: 70
End: 2020-10-07
Payer: MEDICARE

## 2020-10-07 DIAGNOSIS — I27.20 PULMONARY HYPERTENSION (HCC): ICD-10-CM

## 2020-10-07 PROCEDURE — G0239 OTH RESP PROC, GROUP: HCPCS

## 2020-10-12 ENCOUNTER — CLINICAL SUPPORT (OUTPATIENT)
Dept: PULMONOLOGY | Facility: CLINIC | Age: 70
End: 2020-10-12
Payer: MEDICARE

## 2020-10-12 DIAGNOSIS — I27.20 PULMONARY HYPERTENSION (HCC): ICD-10-CM

## 2020-10-12 PROCEDURE — G0239 OTH RESP PROC, GROUP: HCPCS

## 2020-10-13 ENCOUNTER — TELEPHONE (OUTPATIENT)
Dept: NEPHROLOGY | Facility: CLINIC | Age: 70
End: 2020-10-13

## 2020-10-14 ENCOUNTER — CLINICAL SUPPORT (OUTPATIENT)
Dept: PULMONOLOGY | Facility: CLINIC | Age: 70
End: 2020-10-14
Payer: MEDICARE

## 2020-10-14 DIAGNOSIS — I27.20 PULMONARY HYPERTENSION (HCC): ICD-10-CM

## 2020-10-14 PROCEDURE — G0239 OTH RESP PROC, GROUP: HCPCS

## 2020-10-17 DIAGNOSIS — K21.9 GERD (GASTROESOPHAGEAL REFLUX DISEASE): ICD-10-CM

## 2020-10-19 ENCOUNTER — CLINICAL SUPPORT (OUTPATIENT)
Dept: PULMONOLOGY | Facility: CLINIC | Age: 70
End: 2020-10-19
Payer: MEDICARE

## 2020-10-19 DIAGNOSIS — I27.20 PULMONARY HYPERTENSION (HCC): ICD-10-CM

## 2020-10-19 PROCEDURE — G0239 OTH RESP PROC, GROUP: HCPCS

## 2020-10-21 ENCOUNTER — CLINICAL SUPPORT (OUTPATIENT)
Dept: PULMONOLOGY | Facility: CLINIC | Age: 70
End: 2020-10-21
Payer: MEDICARE

## 2020-10-21 DIAGNOSIS — I27.20 PULMONARY HYPERTENSION (HCC): ICD-10-CM

## 2020-10-21 PROCEDURE — G0239 OTH RESP PROC, GROUP: HCPCS

## 2020-10-22 ENCOUNTER — CONSULT (OUTPATIENT)
Dept: NEUROLOGY | Facility: CLINIC | Age: 70
End: 2020-10-22
Payer: MEDICARE

## 2020-10-22 VITALS
WEIGHT: 160 LBS | TEMPERATURE: 97.9 F | HEIGHT: 63 IN | HEART RATE: 70 BPM | BODY MASS INDEX: 28.35 KG/M2 | DIASTOLIC BLOOD PRESSURE: 80 MMHG | SYSTOLIC BLOOD PRESSURE: 140 MMHG

## 2020-10-22 DIAGNOSIS — R26.2 AMBULATORY DYSFUNCTION: Primary | ICD-10-CM

## 2020-10-22 DIAGNOSIS — G60.3 IDIOPATHIC PROGRESSIVE NEUROPATHY: ICD-10-CM

## 2020-10-22 DIAGNOSIS — R41.3 MEMORY CHANGES: ICD-10-CM

## 2020-10-22 PROCEDURE — 99204 OFFICE O/P NEW MOD 45 MIN: CPT | Performed by: PSYCHIATRY & NEUROLOGY

## 2020-10-26 ENCOUNTER — CLINICAL SUPPORT (OUTPATIENT)
Dept: PULMONOLOGY | Facility: CLINIC | Age: 70
End: 2020-10-26
Payer: MEDICARE

## 2020-10-26 DIAGNOSIS — I27.20 PULMONARY HYPERTENSION (HCC): ICD-10-CM

## 2020-10-26 DIAGNOSIS — I10 ESSENTIAL HYPERTENSION: Primary | ICD-10-CM

## 2020-10-26 PROCEDURE — G0239 OTH RESP PROC, GROUP: HCPCS

## 2020-10-26 RX ORDER — PANTOPRAZOLE SODIUM 40 MG/1
TABLET, DELAYED RELEASE ORAL
Qty: 90 TABLET | Refills: 1 | Status: SHIPPED | OUTPATIENT
Start: 2020-10-26 | End: 2021-04-04

## 2020-10-27 RX ORDER — METOPROLOL TARTRATE 50 MG/1
50 TABLET, FILM COATED ORAL EVERY 12 HOURS SCHEDULED
Qty: 180 TABLET | Refills: 1 | Status: SHIPPED | OUTPATIENT
Start: 2020-10-27 | End: 2021-02-04 | Stop reason: SDUPTHER

## 2020-10-28 ENCOUNTER — CLINICAL SUPPORT (OUTPATIENT)
Dept: PULMONOLOGY | Facility: CLINIC | Age: 70
End: 2020-10-28
Payer: MEDICARE

## 2020-10-28 DIAGNOSIS — I27.20 PULMONARY HYPERTENSION (HCC): ICD-10-CM

## 2020-10-28 PROCEDURE — G0239 OTH RESP PROC, GROUP: HCPCS

## 2020-10-29 ENCOUNTER — HOSPITAL ENCOUNTER (OUTPATIENT)
Dept: RADIOLOGY | Facility: HOSPITAL | Age: 70
Discharge: HOME | End: 2020-10-29
Payer: MEDICARE

## 2020-10-29 DIAGNOSIS — R11.2 NAUSEA AND VOMITING, INTRACTABILITY OF VOMITING NOT SPECIFIED, UNSPECIFIED VOMITING TYPE: ICD-10-CM

## 2020-10-29 PROCEDURE — A9541 TC99M SULFUR COLLOID: HCPCS

## 2020-10-29 PROCEDURE — G1004 CDSM NDSC: HCPCS

## 2020-10-29 PROCEDURE — 78264 GASTRIC EMPTYING IMG STUDY: CPT

## 2020-10-30 ENCOUNTER — TRANSCRIBE ORDERS (OUTPATIENT)
Dept: ADMISSIONS | Facility: HOSPITAL | Age: 70
End: 2020-10-30

## 2020-10-30 ENCOUNTER — HOSPITAL ENCOUNTER (OUTPATIENT)
Dept: PULMONOLOGY | Facility: HOSPITAL | Age: 70
Discharge: HOME | End: 2020-10-30
Attending: INTERNAL MEDICINE
Payer: MEDICARE

## 2020-10-30 ENCOUNTER — HOSPITAL ENCOUNTER (OUTPATIENT)
Dept: RADIOLOGY | Facility: HOSPITAL | Age: 70
Discharge: HOME | End: 2020-10-30
Attending: INTERNAL MEDICINE
Payer: MEDICARE

## 2020-10-30 DIAGNOSIS — I27.20 PULMONARY HYPERTENSION (HCC): ICD-10-CM

## 2020-10-30 DIAGNOSIS — J96.01 ACUTE RESPIRATORY FAILURE WITH HYPOXIA (HCC): ICD-10-CM

## 2020-10-30 DIAGNOSIS — T17.908S ASPIRATION INTO RESPIRATORY TRACT, SEQUELA: ICD-10-CM

## 2020-10-30 PROCEDURE — 94060 EVALUATION OF WHEEZING: CPT | Performed by: INTERNAL MEDICINE

## 2020-10-30 PROCEDURE — 71250 CT THORAX DX C-: CPT

## 2020-10-30 PROCEDURE — 94726 PLETHYSMOGRAPHY LUNG VOLUMES: CPT | Performed by: INTERNAL MEDICINE

## 2020-10-30 PROCEDURE — 94729 DIFFUSING CAPACITY: CPT | Performed by: INTERNAL MEDICINE

## 2020-10-30 PROCEDURE — 94060 EVALUATION OF WHEEZING: CPT

## 2020-10-30 PROCEDURE — 94729 DIFFUSING CAPACITY: CPT

## 2020-10-30 PROCEDURE — 94760 N-INVAS EAR/PLS OXIMETRY 1: CPT

## 2020-10-30 PROCEDURE — 94726 PLETHYSMOGRAPHY LUNG VOLUMES: CPT

## 2020-10-31 ENCOUNTER — TELEPHONE (OUTPATIENT)
Dept: GASTROENTEROLOGY | Facility: AMBULARY SURGERY CENTER | Age: 70
End: 2020-10-31

## 2020-11-02 ENCOUNTER — CLINICAL SUPPORT (OUTPATIENT)
Dept: PULMONOLOGY | Facility: CLINIC | Age: 70
End: 2020-11-02
Payer: MEDICARE

## 2020-11-02 DIAGNOSIS — I27.20 PULMONARY HYPERTENSION (HCC): ICD-10-CM

## 2020-11-02 DIAGNOSIS — K21.9 GASTROESOPHAGEAL REFLUX DISEASE, UNSPECIFIED WHETHER ESOPHAGITIS PRESENT: Primary | ICD-10-CM

## 2020-11-02 PROCEDURE — G0239 OTH RESP PROC, GROUP: HCPCS

## 2020-11-02 RX ORDER — FAMOTIDINE 40 MG/1
40 TABLET, FILM COATED ORAL DAILY
Qty: 30 TABLET | Refills: 5 | Status: SHIPPED | OUTPATIENT
Start: 2020-11-02 | End: 2021-04-02 | Stop reason: SDUPTHER

## 2020-11-03 LAB
CREAT ?TM UR-SCNC: 87.7 UMOL/L
EXT PROTEIN URINE: 188.5
PROT/CREAT UR: 2.15 MG/G{CREAT}

## 2020-11-04 ENCOUNTER — CLINICAL SUPPORT (OUTPATIENT)
Dept: PULMONOLOGY | Facility: CLINIC | Age: 70
End: 2020-11-04
Payer: MEDICARE

## 2020-11-04 DIAGNOSIS — I27.20 PULMONARY HYPERTENSION (HCC): ICD-10-CM

## 2020-11-04 PROCEDURE — G0239 OTH RESP PROC, GROUP: HCPCS

## 2020-11-05 ENCOUNTER — HOSPITAL ENCOUNTER (OUTPATIENT)
Dept: RADIOLOGY | Facility: HOSPITAL | Age: 70
Discharge: HOME | End: 2020-11-05
Payer: MEDICARE

## 2020-11-05 ENCOUNTER — TELEPHONE (OUTPATIENT)
Dept: NEPHROLOGY | Facility: CLINIC | Age: 70
End: 2020-11-05

## 2020-11-05 DIAGNOSIS — R26.2 AMBULATORY DYSFUNCTION: ICD-10-CM

## 2020-11-05 PROCEDURE — 70551 MRI BRAIN STEM W/O DYE: CPT

## 2020-11-05 PROCEDURE — G1004 CDSM NDSC: HCPCS

## 2020-11-09 ENCOUNTER — CLINICAL SUPPORT (OUTPATIENT)
Dept: PULMONOLOGY | Facility: CLINIC | Age: 70
End: 2020-11-09
Payer: MEDICARE

## 2020-11-09 ENCOUNTER — OFFICE VISIT (OUTPATIENT)
Dept: GASTROENTEROLOGY | Facility: AMBULARY SURGERY CENTER | Age: 70
End: 2020-11-09
Payer: MEDICARE

## 2020-11-09 VITALS — RESPIRATION RATE: 16 BRPM | HEIGHT: 63 IN | BODY MASS INDEX: 29.84 KG/M2 | TEMPERATURE: 97.8 F | WEIGHT: 168.4 LBS

## 2020-11-09 DIAGNOSIS — K31.84 GASTROPARESIS: Primary | ICD-10-CM

## 2020-11-09 DIAGNOSIS — I27.20 PULMONARY HYPERTENSION (HCC): ICD-10-CM

## 2020-11-09 DIAGNOSIS — K21.9 GASTROESOPHAGEAL REFLUX DISEASE WITHOUT ESOPHAGITIS: ICD-10-CM

## 2020-11-09 DIAGNOSIS — I27.20 PROGRESSIVE PULMONARY HYPERTENSION (HCC): ICD-10-CM

## 2020-11-09 DIAGNOSIS — K59.00 CONSTIPATION, UNSPECIFIED CONSTIPATION TYPE: ICD-10-CM

## 2020-11-09 PROCEDURE — 99213 OFFICE O/P EST LOW 20 MIN: CPT | Performed by: INTERNAL MEDICINE

## 2020-11-09 PROCEDURE — G0239 OTH RESP PROC, GROUP: HCPCS

## 2020-11-09 RX ORDER — ROSUVASTATIN CALCIUM 5 MG/1
TABLET, COATED ORAL
COMMUNITY
Start: 2020-10-13 | End: 2021-10-12

## 2020-11-11 ENCOUNTER — EVALUATION (OUTPATIENT)
Dept: PHYSICAL THERAPY | Facility: CLINIC | Age: 70
End: 2020-11-11
Payer: MEDICARE

## 2020-11-11 ENCOUNTER — CLINICAL SUPPORT (OUTPATIENT)
Dept: PULMONOLOGY | Facility: CLINIC | Age: 70
End: 2020-11-11
Payer: MEDICARE

## 2020-11-11 DIAGNOSIS — R26.2 AMBULATORY DYSFUNCTION: Primary | ICD-10-CM

## 2020-11-11 DIAGNOSIS — I27.20 PULMONARY HYPERTENSION (HCC): ICD-10-CM

## 2020-11-11 PROCEDURE — 97161 PT EVAL LOW COMPLEX 20 MIN: CPT | Performed by: PHYSICAL THERAPIST

## 2020-11-11 PROCEDURE — 97140 MANUAL THERAPY 1/> REGIONS: CPT | Performed by: PHYSICAL THERAPIST

## 2020-11-11 PROCEDURE — G0239 OTH RESP PROC, GROUP: HCPCS

## 2020-11-11 PROCEDURE — 97112 NEUROMUSCULAR REEDUCATION: CPT | Performed by: PHYSICAL THERAPIST

## 2020-11-16 ENCOUNTER — OFFICE VISIT (OUTPATIENT)
Dept: PHYSICAL THERAPY | Facility: CLINIC | Age: 70
End: 2020-11-16
Payer: MEDICARE

## 2020-11-16 ENCOUNTER — CLINICAL SUPPORT (OUTPATIENT)
Dept: PULMONOLOGY | Facility: CLINIC | Age: 70
End: 2020-11-16
Payer: MEDICARE

## 2020-11-16 DIAGNOSIS — I27.20 PULMONARY HYPERTENSION (HCC): ICD-10-CM

## 2020-11-16 DIAGNOSIS — R26.2 AMBULATORY DYSFUNCTION: Primary | ICD-10-CM

## 2020-11-16 PROCEDURE — 97112 NEUROMUSCULAR REEDUCATION: CPT

## 2020-11-16 PROCEDURE — 97110 THERAPEUTIC EXERCISES: CPT

## 2020-11-16 PROCEDURE — G0239 OTH RESP PROC, GROUP: HCPCS

## 2020-11-17 ENCOUNTER — PATIENT MESSAGE (OUTPATIENT)
Dept: FAMILY MEDICINE CLINIC | Facility: CLINIC | Age: 70
End: 2020-11-17

## 2020-11-18 ENCOUNTER — CLINICAL SUPPORT (OUTPATIENT)
Dept: PULMONOLOGY | Facility: CLINIC | Age: 70
End: 2020-11-18
Payer: MEDICARE

## 2020-11-18 ENCOUNTER — OFFICE VISIT (OUTPATIENT)
Dept: PHYSICAL THERAPY | Facility: CLINIC | Age: 70
End: 2020-11-18
Payer: MEDICARE

## 2020-11-18 DIAGNOSIS — I27.20 PULMONARY HYPERTENSION (HCC): ICD-10-CM

## 2020-11-18 DIAGNOSIS — R26.2 AMBULATORY DYSFUNCTION: Primary | ICD-10-CM

## 2020-11-18 PROCEDURE — 97112 NEUROMUSCULAR REEDUCATION: CPT

## 2020-11-18 PROCEDURE — G0239 OTH RESP PROC, GROUP: HCPCS

## 2020-11-18 PROCEDURE — 97110 THERAPEUTIC EXERCISES: CPT

## 2020-11-19 ENCOUNTER — OFFICE VISIT (OUTPATIENT)
Dept: PULMONOLOGY | Facility: CLINIC | Age: 70
End: 2020-11-19
Payer: MEDICARE

## 2020-11-19 ENCOUNTER — DOCUMENTATION (OUTPATIENT)
Dept: NEPHROLOGY | Facility: CLINIC | Age: 70
End: 2020-11-19

## 2020-11-19 VITALS
HEIGHT: 63 IN | TEMPERATURE: 96.4 F | HEART RATE: 69 BPM | WEIGHT: 167.4 LBS | DIASTOLIC BLOOD PRESSURE: 76 MMHG | RESPIRATION RATE: 18 BRPM | BODY MASS INDEX: 29.66 KG/M2 | OXYGEN SATURATION: 94 % | SYSTOLIC BLOOD PRESSURE: 142 MMHG

## 2020-11-19 DIAGNOSIS — G47.33 OSA ON CPAP: Primary | ICD-10-CM

## 2020-11-19 DIAGNOSIS — G25.81 RLS (RESTLESS LEGS SYNDROME): ICD-10-CM

## 2020-11-19 DIAGNOSIS — Z99.89 OSA ON CPAP: Primary | ICD-10-CM

## 2020-11-19 DIAGNOSIS — J96.11 CHRONIC RESPIRATORY FAILURE WITH HYPOXIA (HCC): ICD-10-CM

## 2020-11-19 DIAGNOSIS — I27.20 PULMONARY HYPERTENSION (HCC): ICD-10-CM

## 2020-11-19 PROCEDURE — 99214 OFFICE O/P EST MOD 30 MIN: CPT | Performed by: INTERNAL MEDICINE

## 2020-11-19 RX ORDER — ACETAMINOPHEN 500 MG
1000 TABLET ORAL AS NEEDED
COMMUNITY
End: 2022-04-14 | Stop reason: HOSPADM

## 2020-11-20 ENCOUNTER — DOCUMENTATION (OUTPATIENT)
Dept: PULMONOLOGY | Facility: CLINIC | Age: 70
End: 2020-11-20

## 2020-11-23 ENCOUNTER — OFFICE VISIT (OUTPATIENT)
Dept: PHYSICAL THERAPY | Facility: CLINIC | Age: 70
End: 2020-11-23
Payer: MEDICARE

## 2020-11-23 DIAGNOSIS — R26.2 AMBULATORY DYSFUNCTION: Primary | ICD-10-CM

## 2020-11-23 PROCEDURE — 97110 THERAPEUTIC EXERCISES: CPT

## 2020-11-23 PROCEDURE — 97112 NEUROMUSCULAR REEDUCATION: CPT

## 2020-11-24 DIAGNOSIS — G25.81 RLS (RESTLESS LEGS SYNDROME): Primary | ICD-10-CM

## 2020-11-24 RX ORDER — PRAMIPEXOLE DIHYDROCHLORIDE 0.25 MG/1
0.25 TABLET ORAL DAILY
Qty: 90 TABLET | Refills: 3 | Status: SHIPPED | OUTPATIENT
Start: 2020-11-24 | End: 2021-12-22 | Stop reason: SDUPTHER

## 2020-11-25 ENCOUNTER — OFFICE VISIT (OUTPATIENT)
Dept: PHYSICAL THERAPY | Facility: CLINIC | Age: 70
End: 2020-11-25
Payer: MEDICARE

## 2020-11-25 DIAGNOSIS — R26.2 AMBULATORY DYSFUNCTION: Primary | ICD-10-CM

## 2020-11-25 PROCEDURE — 97110 THERAPEUTIC EXERCISES: CPT

## 2020-11-25 PROCEDURE — 97112 NEUROMUSCULAR REEDUCATION: CPT

## 2020-11-30 ENCOUNTER — EVALUATION (OUTPATIENT)
Dept: PHYSICAL THERAPY | Facility: CLINIC | Age: 70
End: 2020-11-30
Payer: MEDICARE

## 2020-11-30 DIAGNOSIS — R26.2 AMBULATORY DYSFUNCTION: Primary | ICD-10-CM

## 2020-11-30 PROCEDURE — 97110 THERAPEUTIC EXERCISES: CPT | Performed by: PHYSICAL THERAPIST

## 2020-11-30 PROCEDURE — 97140 MANUAL THERAPY 1/> REGIONS: CPT | Performed by: PHYSICAL THERAPIST

## 2020-12-01 ENCOUNTER — TELEPHONE (OUTPATIENT)
Dept: NEPHROLOGY | Facility: CLINIC | Age: 70
End: 2020-12-01

## 2020-12-01 ENCOUNTER — OFFICE VISIT (OUTPATIENT)
Dept: NEPHROLOGY | Facility: CLINIC | Age: 70
End: 2020-12-01
Payer: MEDICARE

## 2020-12-01 VITALS — HEIGHT: 63 IN | BODY MASS INDEX: 29.77 KG/M2 | WEIGHT: 168 LBS

## 2020-12-01 DIAGNOSIS — R80.1 PERSISTENT PROTEINURIA: ICD-10-CM

## 2020-12-01 DIAGNOSIS — N18.32 STAGE 3B CHRONIC KIDNEY DISEASE (HCC): ICD-10-CM

## 2020-12-01 DIAGNOSIS — N18.32 ANEMIA OF CHRONIC RENAL FAILURE, STAGE 3B (HCC): ICD-10-CM

## 2020-12-01 DIAGNOSIS — E55.9 VITAMIN D DEFICIENCY: ICD-10-CM

## 2020-12-01 DIAGNOSIS — N18.32 TYPE 2 DIABETES MELLITUS WITH STAGE 3B CHRONIC KIDNEY DISEASE, UNSPECIFIED WHETHER LONG TERM INSULIN USE (HCC): ICD-10-CM

## 2020-12-01 DIAGNOSIS — E78.5 DYSLIPIDEMIA: ICD-10-CM

## 2020-12-01 DIAGNOSIS — I12.9 HYPERTENSIVE CHRONIC KIDNEY DISEASE WITH STAGE 1 THROUGH STAGE 4 CHRONIC KIDNEY DISEASE, OR UNSPECIFIED CHRONIC KIDNEY DISEASE: Primary | ICD-10-CM

## 2020-12-01 DIAGNOSIS — D63.1 ANEMIA OF CHRONIC RENAL FAILURE, STAGE 3B (HCC): ICD-10-CM

## 2020-12-01 DIAGNOSIS — E11.22 TYPE 2 DIABETES MELLITUS WITH STAGE 3B CHRONIC KIDNEY DISEASE, UNSPECIFIED WHETHER LONG TERM INSULIN USE (HCC): ICD-10-CM

## 2020-12-01 PROCEDURE — 99214 OFFICE O/P EST MOD 30 MIN: CPT | Performed by: INTERNAL MEDICINE

## 2020-12-01 RX ORDER — OLMESARTAN MEDOXOMIL 20 MG/1
20 TABLET ORAL
Qty: 90 TABLET | Refills: 3 | Status: SHIPPED | OUTPATIENT
Start: 2020-12-01 | End: 2021-01-06

## 2020-12-01 RX ORDER — OLMESARTAN MEDOXOMIL 5 MG/1
10 TABLET ORAL
COMMUNITY
End: 2021-01-06

## 2020-12-02 ENCOUNTER — OFFICE VISIT (OUTPATIENT)
Dept: PHYSICAL THERAPY | Facility: CLINIC | Age: 70
End: 2020-12-02
Payer: MEDICARE

## 2020-12-02 DIAGNOSIS — M54.12 CERVICAL RADICULOPATHY: Primary | ICD-10-CM

## 2020-12-02 PROCEDURE — 97162 PT EVAL MOD COMPLEX 30 MIN: CPT | Performed by: PHYSICAL THERAPIST

## 2020-12-07 ENCOUNTER — TELEPHONE (OUTPATIENT)
Dept: NEPHROLOGY | Facility: CLINIC | Age: 70
End: 2020-12-07

## 2020-12-07 ENCOUNTER — APPOINTMENT (OUTPATIENT)
Dept: PHYSICAL THERAPY | Facility: CLINIC | Age: 70
End: 2020-12-07
Payer: MEDICARE

## 2020-12-07 DIAGNOSIS — N18.32 STAGE 3B CHRONIC KIDNEY DISEASE (HCC): ICD-10-CM

## 2020-12-07 DIAGNOSIS — E78.5 DYSLIPIDEMIA: ICD-10-CM

## 2020-12-07 DIAGNOSIS — E11.22 TYPE 2 DIABETES MELLITUS WITH STAGE 3B CHRONIC KIDNEY DISEASE, UNSPECIFIED WHETHER LONG TERM INSULIN USE (HCC): ICD-10-CM

## 2020-12-07 DIAGNOSIS — I12.9 HYPERTENSIVE CHRONIC KIDNEY DISEASE WITH STAGE 1 THROUGH STAGE 4 CHRONIC KIDNEY DISEASE, OR UNSPECIFIED CHRONIC KIDNEY DISEASE: Primary | ICD-10-CM

## 2020-12-07 DIAGNOSIS — N18.32 TYPE 2 DIABETES MELLITUS WITH STAGE 3B CHRONIC KIDNEY DISEASE, UNSPECIFIED WHETHER LONG TERM INSULIN USE (HCC): ICD-10-CM

## 2020-12-09 ENCOUNTER — OFFICE VISIT (OUTPATIENT)
Dept: PHYSICAL THERAPY | Facility: CLINIC | Age: 70
End: 2020-12-09
Payer: MEDICARE

## 2020-12-09 DIAGNOSIS — M54.12 CERVICAL RADICULOPATHY: Primary | ICD-10-CM

## 2020-12-09 PROCEDURE — 97012 MECHANICAL TRACTION THERAPY: CPT | Performed by: PHYSICAL THERAPIST

## 2020-12-09 PROCEDURE — 97110 THERAPEUTIC EXERCISES: CPT | Performed by: PHYSICAL THERAPIST

## 2020-12-14 ENCOUNTER — OFFICE VISIT (OUTPATIENT)
Dept: PHYSICAL THERAPY | Facility: CLINIC | Age: 70
End: 2020-12-14
Payer: MEDICARE

## 2020-12-14 ENCOUNTER — APPOINTMENT (OUTPATIENT)
Dept: PHYSICAL THERAPY | Facility: CLINIC | Age: 70
End: 2020-12-14
Payer: MEDICARE

## 2020-12-14 DIAGNOSIS — R26.2 AMBULATORY DYSFUNCTION: ICD-10-CM

## 2020-12-14 DIAGNOSIS — M54.12 CERVICAL RADICULOPATHY: Primary | ICD-10-CM

## 2020-12-14 PROCEDURE — 97012 MECHANICAL TRACTION THERAPY: CPT

## 2020-12-14 PROCEDURE — 97110 THERAPEUTIC EXERCISES: CPT

## 2020-12-16 ENCOUNTER — APPOINTMENT (OUTPATIENT)
Dept: PHYSICAL THERAPY | Facility: CLINIC | Age: 70
End: 2020-12-16
Payer: MEDICARE

## 2020-12-16 ENCOUNTER — OFFICE VISIT (OUTPATIENT)
Dept: PHYSICAL THERAPY | Facility: CLINIC | Age: 70
End: 2020-12-16
Payer: MEDICARE

## 2020-12-16 DIAGNOSIS — R26.2 AMBULATORY DYSFUNCTION: ICD-10-CM

## 2020-12-16 DIAGNOSIS — M54.12 CERVICAL RADICULOPATHY: Primary | ICD-10-CM

## 2020-12-16 PROCEDURE — 97012 MECHANICAL TRACTION THERAPY: CPT | Performed by: PHYSICAL THERAPIST

## 2020-12-16 PROCEDURE — 97112 NEUROMUSCULAR REEDUCATION: CPT | Performed by: PHYSICAL THERAPIST

## 2020-12-16 PROCEDURE — 97110 THERAPEUTIC EXERCISES: CPT | Performed by: PHYSICAL THERAPIST

## 2020-12-21 ENCOUNTER — CLINICAL SUPPORT (OUTPATIENT)
Dept: NEPHROLOGY | Facility: CLINIC | Age: 70
End: 2020-12-21

## 2020-12-21 ENCOUNTER — OFFICE VISIT (OUTPATIENT)
Dept: PHYSICAL THERAPY | Facility: CLINIC | Age: 70
End: 2020-12-21
Payer: MEDICARE

## 2020-12-21 ENCOUNTER — TELEPHONE (OUTPATIENT)
Dept: NEPHROLOGY | Facility: CLINIC | Age: 70
End: 2020-12-21

## 2020-12-21 DIAGNOSIS — N18.32 STAGE 3B CHRONIC KIDNEY DISEASE (HCC): ICD-10-CM

## 2020-12-21 DIAGNOSIS — E11.22 TYPE 2 DIABETES MELLITUS WITH STAGE 3B CHRONIC KIDNEY DISEASE, UNSPECIFIED WHETHER LONG TERM INSULIN USE (HCC): ICD-10-CM

## 2020-12-21 DIAGNOSIS — E78.5 DYSLIPIDEMIA: ICD-10-CM

## 2020-12-21 DIAGNOSIS — N18.32 TYPE 2 DIABETES MELLITUS WITH STAGE 3B CHRONIC KIDNEY DISEASE, UNSPECIFIED WHETHER LONG TERM INSULIN USE (HCC): ICD-10-CM

## 2020-12-21 DIAGNOSIS — M54.12 CERVICAL RADICULOPATHY: Primary | ICD-10-CM

## 2020-12-21 DIAGNOSIS — I12.9 HYPERTENSIVE CHRONIC KIDNEY DISEASE WITH STAGE 1 THROUGH STAGE 4 CHRONIC KIDNEY DISEASE, OR UNSPECIFIED CHRONIC KIDNEY DISEASE: ICD-10-CM

## 2020-12-21 PROCEDURE — 97012 MECHANICAL TRACTION THERAPY: CPT | Performed by: PHYSICAL THERAPIST

## 2020-12-21 PROCEDURE — 97112 NEUROMUSCULAR REEDUCATION: CPT | Performed by: PHYSICAL THERAPIST

## 2020-12-21 PROCEDURE — 97110 THERAPEUTIC EXERCISES: CPT | Performed by: PHYSICAL THERAPIST

## 2020-12-23 ENCOUNTER — OFFICE VISIT (OUTPATIENT)
Dept: PHYSICAL THERAPY | Facility: CLINIC | Age: 70
End: 2020-12-23
Payer: MEDICARE

## 2020-12-23 DIAGNOSIS — M54.12 CERVICAL RADICULOPATHY: Primary | ICD-10-CM

## 2020-12-23 DIAGNOSIS — R26.2 AMBULATORY DYSFUNCTION: ICD-10-CM

## 2020-12-23 PROCEDURE — 97110 THERAPEUTIC EXERCISES: CPT

## 2020-12-23 PROCEDURE — 97112 NEUROMUSCULAR REEDUCATION: CPT

## 2020-12-23 PROCEDURE — 97012 MECHANICAL TRACTION THERAPY: CPT

## 2020-12-28 ENCOUNTER — APPOINTMENT (OUTPATIENT)
Dept: PHYSICAL THERAPY | Facility: CLINIC | Age: 70
End: 2020-12-28
Payer: MEDICARE

## 2020-12-30 ENCOUNTER — APPOINTMENT (OUTPATIENT)
Dept: PHYSICAL THERAPY | Facility: CLINIC | Age: 70
End: 2020-12-30
Payer: MEDICARE

## 2020-12-31 ENCOUNTER — OFFICE VISIT (OUTPATIENT)
Dept: PHYSICAL THERAPY | Facility: CLINIC | Age: 70
End: 2020-12-31
Payer: MEDICARE

## 2020-12-31 DIAGNOSIS — M54.12 CERVICAL RADICULOPATHY: Primary | ICD-10-CM

## 2020-12-31 PROCEDURE — 97012 MECHANICAL TRACTION THERAPY: CPT | Performed by: PHYSICAL THERAPIST

## 2020-12-31 PROCEDURE — 97112 NEUROMUSCULAR REEDUCATION: CPT | Performed by: PHYSICAL THERAPIST

## 2020-12-31 PROCEDURE — 97110 THERAPEUTIC EXERCISES: CPT | Performed by: PHYSICAL THERAPIST

## 2021-01-04 ENCOUNTER — OFFICE VISIT (OUTPATIENT)
Dept: PHYSICAL THERAPY | Facility: CLINIC | Age: 71
End: 2021-01-04
Payer: MEDICARE

## 2021-01-04 DIAGNOSIS — R26.2 AMBULATORY DYSFUNCTION: ICD-10-CM

## 2021-01-04 DIAGNOSIS — M54.12 CERVICAL RADICULOPATHY: Primary | ICD-10-CM

## 2021-01-04 PROCEDURE — 97012 MECHANICAL TRACTION THERAPY: CPT

## 2021-01-04 PROCEDURE — 97112 NEUROMUSCULAR REEDUCATION: CPT

## 2021-01-04 PROCEDURE — 97110 THERAPEUTIC EXERCISES: CPT

## 2021-01-04 NOTE — PROGRESS NOTES
Daily Note     Today's date: 2021  Patient name: Lorrie Oates  : 1950  MRN: [id number]  Referring provider: Olivia Brownlee MD  Dx:   Encounter Diagnosis     ICD-10-CM    1  Cervical radiculopathy  M54 12    2  Ambulatory dysfunction  R26 2                   Subjective: Patient states that she has noticed an improvement with pain while looking down at an ipad and pain has not disrupted her sleep recently  Objective: See treatment diary below      Assessment: Tolerated treatment well  Patient exhibited good technique with therapeutic exercises      Plan: Continue per plan of care        Precautions: Fall Risk, DM2 with neuropathy, HTN, Fibromyalgia, Hx of Skin CA      Manuals      Distraction G2-4  mech n v  10-20# 15 min mech CS 20# static mechanical mech mech mech mech       Distraction Retraction 2 x 5 2 x 5 2x5- TS 2 x 5 NV?> Held                                 Neuro Re-Ed             Postural Education 10 min            Scap Add  10" x 10 10" x 10  10" x 10 10" x 10 10"x10 10" x 10 10" x 10      Tband Row + ext     RTB 2 x 10 RTB 3 x RTB 3 x 10 RTB 3 x 10      Postural ER     YTB x 10  YTB x 10  YTB x 10 YTB  2 x 10      SA punches    2 x 10 2 x 10 2x10  2 x 10 2 x 10                                             Therex              seated retraction trial nv 2 x 10 2 x 10  2 x 10 2 x 10 2 x 10  2 x 10 2 x 10      Supine retraction nv 2 x 10 2x10 2 x 10 2 x 10 2 x10  2 x 10 2 x 10      supine retraction OP nv       X 10                  Scaption  2 x 10 2 x 10  2 x 10  2 x 10 2 x 10  2 x 10 2 x 10      SNAG towel ST L > R  6" x 10 6" x 10  6" x 10 6" x 10 6"x 10  6" x 10 missed - NV     NLTT Median B  10 x 1" B 10x 1" 10 x 1"  10 x 1" 1"  X 10 2 1" x 10 1"x10      Ther Activity                                       Gait Training                                       Modalities             HP to neck prn  10 min 10 min  10 min dec dec  10 min post Mechanical traction   20# 3 step up/down 12 min  20# 3 steps up/down 12 min 22# 3 steps up and down 15 min 22# 3 steps up/down 10 min  25# 3 steps up/down 15 min 25# 3 step 10 min

## 2021-01-06 ENCOUNTER — OFFICE VISIT (OUTPATIENT)
Dept: NEPHROLOGY | Facility: CLINIC | Age: 71
End: 2021-01-06
Payer: MEDICARE

## 2021-01-06 VITALS
BODY MASS INDEX: 30.12 KG/M2 | HEIGHT: 63 IN | DIASTOLIC BLOOD PRESSURE: 96 MMHG | SYSTOLIC BLOOD PRESSURE: 158 MMHG | HEART RATE: 63 BPM | WEIGHT: 170 LBS

## 2021-01-06 DIAGNOSIS — E11.22 TYPE 2 DIABETES MELLITUS WITH STAGE 3B CHRONIC KIDNEY DISEASE, UNSPECIFIED WHETHER LONG TERM INSULIN USE (HCC): ICD-10-CM

## 2021-01-06 DIAGNOSIS — E55.9 VITAMIN D DEFICIENCY: ICD-10-CM

## 2021-01-06 DIAGNOSIS — I12.9 HYPERTENSIVE CHRONIC KIDNEY DISEASE WITH STAGE 1 THROUGH STAGE 4 CHRONIC KIDNEY DISEASE, OR UNSPECIFIED CHRONIC KIDNEY DISEASE: ICD-10-CM

## 2021-01-06 DIAGNOSIS — E78.5 DYSLIPIDEMIA: ICD-10-CM

## 2021-01-06 DIAGNOSIS — D63.1 ANEMIA OF CHRONIC RENAL FAILURE, STAGE 3B (HCC): ICD-10-CM

## 2021-01-06 DIAGNOSIS — N18.30 BENIGN HYPERTENSION WITH CKD (CHRONIC KIDNEY DISEASE) STAGE III (HCC): ICD-10-CM

## 2021-01-06 DIAGNOSIS — N18.32 TYPE 2 DIABETES MELLITUS WITH STAGE 3B CHRONIC KIDNEY DISEASE, UNSPECIFIED WHETHER LONG TERM INSULIN USE (HCC): ICD-10-CM

## 2021-01-06 DIAGNOSIS — I12.9 BENIGN HYPERTENSION WITH CKD (CHRONIC KIDNEY DISEASE) STAGE III (HCC): ICD-10-CM

## 2021-01-06 DIAGNOSIS — R80.1 PERSISTENT PROTEINURIA: ICD-10-CM

## 2021-01-06 DIAGNOSIS — N18.32 ANEMIA OF CHRONIC RENAL FAILURE, STAGE 3B (HCC): ICD-10-CM

## 2021-01-06 DIAGNOSIS — N18.32 STAGE 3B CHRONIC KIDNEY DISEASE (HCC): Primary | ICD-10-CM

## 2021-01-06 PROCEDURE — 99213 OFFICE O/P EST LOW 20 MIN: CPT | Performed by: PHYSICIAN ASSISTANT

## 2021-01-06 RX ORDER — OLMESARTAN MEDOXOMIL 20 MG/1
20 TABLET ORAL 2 TIMES DAILY
Qty: 120 TABLET | Refills: 3 | Status: SHIPPED | OUTPATIENT
Start: 2021-01-06 | End: 2021-02-02

## 2021-01-06 NOTE — PROGRESS NOTES
Assessment and Plan:    Melody Anderson was seen today for follow-up  Diagnoses and all orders for this visit:    Stage 3b chronic kidney disease  -     olmesartan (BENICAR) 20 mg tablet; Take 1 tablet (20 mg total) by mouth 2 (two) times a day    Benign hypertension with CKD (chronic kidney disease) stage III  -     Basic metabolic panel; Future    Hypertensive chronic kidney disease with stage 1 through stage 4 chronic kidney disease, or unspecified chronic kidney disease  -     olmesartan (BENICAR) 20 mg tablet; Take 1 tablet (20 mg total) by mouth 2 (two) times a day    Anemia of chronic renal failure, stage 3b  -     olmesartan (BENICAR) 20 mg tablet; Take 1 tablet (20 mg total) by mouth 2 (two) times a day    Type 2 diabetes mellitus with stage 3b chronic kidney disease, unspecified whether long term insulin use (HCC)  -     olmesartan (BENICAR) 20 mg tablet; Take 1 tablet (20 mg total) by mouth 2 (two) times a day    Dyslipidemia  -     olmesartan (BENICAR) 20 mg tablet; Take 1 tablet (20 mg total) by mouth 2 (two) times a day    Persistent proteinuria  -     olmesartan (BENICAR) 20 mg tablet; Take 1 tablet (20 mg total) by mouth 2 (two) times a day    Vitamin D deficiency  -     olmesartan (BENICAR) 20 mg tablet; Take 1 tablet (20 mg total) by mouth 2 (two) times a day      Chronic Kidney Disease stage IV- Baseline creatinine 1 5-2  Creatinine and electrolytes acceptable (in Care Everywhere) from 1/4/21  Hypertension- Goal blood pressure <125/75  Antihypertensive regimen includes amlodipine 5mg daily, benicar 20mg AM & 10mg PM, metoprolol 50mg twice a day, and torsemide 20mg daily  Avoid salt in your diet  Stay active  Avoid NSAIDs/nonsteroidals  Please increase olmesartan/benicar to 20mg twice a day  Workup: negative (renin/carole, metanephrines, VANCE)    Follow up with me in 4 weeks and Dr Andre Adair in 3 months  Please call the office with any questions or concerns        Reason for Visit: Follow-up (BP CHECK)    HPI: Tiffany Garcia is a 69 y o  female who is here for follow up of hypertension  At her last appointment a month ago, Dr Kennedi Benavidez increased her olmesartan to 20mg AM and 10mg PM for elevated readings in the 368S systolic  Since then, her blood pressures have not improved significantly  She denies weight change or appetite changes  She denies lightheadedness/dizziness  She denies SOB  No LE edema  ROS: A complete review of systems was performed and was negative unless otherwise noted in the history of present illness  Allergies:   Pollen extract and Ciprofloxacin    Medications:     Current Outpatient Medications:     acetaminophen (TYLENOL) 500 mg tablet, Take 1,000 mg by mouth daily, Disp: , Rfl:     albuterol (Ventolin HFA) 90 mcg/act inhaler, Inhale 2 puffs every 6 (six) hours as needed for wheezing or shortness of breath, Disp: , Rfl: 0    amLODIPine (NORVASC) 5 mg tablet, Take 1 tablet (5 mg total) by mouth daily, Disp: , Rfl:     b complex vitamins tablet, Take 1 tablet by mouth daily , Disp: , Rfl:     Calcium Carbonate (CALCIUM 600 PO), Take 1 capsule by mouth 2 (two) times a day, Disp: , Rfl:     DULoxetine (CYMBALTA) 60 mg delayed release capsule, Take 60 mg by mouth daily , Disp: , Rfl:     famotidine (PEPCID) 40 MG tablet, Take 1 tablet (40 mg total) by mouth daily, Disp: 30 tablet, Rfl: 5    fluticasone (FLONASE) 50 mcg/act nasal spray, 1-2 sprays daily, Disp: , Rfl:     gabapentin (NEURONTIN) 300 mg capsule, Take 300 mg by mouth 2 (two) times a day , Disp: , Rfl:     Icosapent Ethyl (Vascepa) 1 g CAPS, Take 1 g by mouth 2 (two) times a day, Disp: , Rfl:     insulin aspart (NovoLOG) 100 units/mL injection, Inject under the skin 3 (three) times a day before meals 14 units, 14 units, 18 units  , Disp: , Rfl:     insulin detemir (LEVEMIR) 100 units/mL subcutaneous injection, Inject 32 Units under the skin daily at bedtime, Disp: , Rfl: 0    levothyroxine 175 mcg tablet, Take 175 mcg by mouth daily, Disp: , Rfl:     Magnesium 500 MG CAPS, Take 1 capsule by mouth 2 (two) times a day, Disp: , Rfl:     metoprolol tartrate (LOPRESSOR) 50 mg tablet, Take 1 tablet (50 mg total) by mouth every 12 (twelve) hours, Disp: 180 tablet, Rfl: 1    olmesartan (BENICAR) 20 mg tablet, Take 1 tablet (20 mg total) by mouth 2 (two) times a day, Disp: 120 tablet, Rfl: 3    pantoprazole (PROTONIX) 40 mg tablet, TAKE 1 TABLET BY MOUTH EVERY DAY (Patient taking differently: Take 40 mg by mouth daily ), Disp: 90 tablet, Rfl: 1    pramipexole (MIRAPEX) 0 25 mg tablet, Take 1 tablet (0 25 mg total) by mouth daily, Disp: 90 tablet, Rfl: 3    rosuvastatin (CRESTOR) 5 mg tablet, TAKE 1 TABLET BY MOUTH EVERY DAY AT NIGHT, Disp: , Rfl:     torsemide (DEMADEX) 20 mg tablet, Take 1 tablet (20 mg total) by mouth daily, Disp: 90 tablet, Rfl: 3    Vitamin D, Ergocalciferol, 2000 units CAPS, Take 2,000 Units by mouth daily , Disp: , Rfl:     B-D ULTRAFINE III SHORT PEN 31G X 8 MM MISC, USE AS DIRECTED 4 TIMES PER DAY, Disp: , Rfl: 3    calcium carbonate (TUMS) 500 mg chewable tablet, Chew 2 tablets (1,000 mg total) daily as needed for indigestion or heartburn (Patient not taking: Reported on 12/1/2020), Disp: , Rfl: 0    Past Medical History:   Diagnosis Date    Anxiety     Arthritis     Aspiration into airway     Diabetes mellitus (HCC)     Family history of thyroid problem     Heart burn     Hyperplasia, parathyroid ([facility] [address]     Hypertension     Kidney problem     Obesity (BMI 30 0-34  9)     Pneumonia     RLS (restless legs syndrome)     Seasonal allergies     Sleep apnea     uses CPAP    Swollen ankles      Past Surgical History:   Procedure Laterality Date    ARTHROSCOPY KNEE      BREAST BIOPSY      CHOLECYSTECTOMY      HIATAL HERNIA REPAIR      LIPOSUCTION      REDUCTION MAMMAPLASTY      SQUAMOUS CELL CARCINOMA EXCISION      TOE SURGERY      TONSILLECTOMY      TUBAL LIGATION       Family History   Problem Relation Age of Onset    Heart disease Mother     Heart disease Father     Lung cancer Father 79        Smoker     Cancer Father         brain    Hypertension Sister     Diabetes Sister     Hypertension Brother     Diabetes Brother     Cancer Brother         esophageal cancer    Dementia Brother     Stroke Brother     No Known Problems Son     No Known Problems Son       reports that she quit smoking about 45 years ago  Her smoking use included cigarettes  She has a 20 00 pack-year smoking history  She has never used smokeless tobacco  She reports previous alcohol use  She reports that she does not use drugs  Physical Exam:   Vitals:    01/06/21 1043 01/06/21 1046 01/06/21 1048 01/06/21 1049   BP: (!) 176/88 156/88 156/82 158/96   BP Location: Left arm Right arm Right arm Left arm   Patient Position: Sitting Sitting Sitting Sitting   Cuff Size: Standard Standard Standard Standard   Pulse: 68 63     Weight:       Height:         Body mass index is 30 11 kg/m²  General: NAD  Neuro: AAO  Skin: no rash  Eyes: anicteric  ENMT: mm moist  Neck: no masses  Respiratory: CTAB  Cardiovascular: RRR  Extremities: no edema  Gastrointestinal: soft nt nd    Procedure:  No results found for this or any previous visit  Labs reviewed      Lab Results   Component Value Date    GLUCOSE 168 (H) 11/30/2018    CALCIUM 9 5 07/15/2020    K 4 2 07/15/2020    CO2 30 07/15/2020     07/15/2020    BUN 19 07/15/2020    CREATININE 1 47 (H) 07/15/2020

## 2021-01-06 NOTE — PATIENT INSTRUCTIONS
Chronic Kidney Disease stage IV- Baseline creatinine 1 5-2  Creatinine and electrolytes acceptable (in Care Everywhere) from 1/4/21  Hypertension- Goal blood pressure <125/75  Antihypertensive regimen includes amlodipine 5mg daily, benicar 20mg AM & 10mg PM, metoprolol 50mg twice a day, and torsemide 20mg daily  Avoid salt in your diet  Stay active  Avoid NSAIDs/nonsteroidals  Please increase olmesartan/benicar to 20mg twice a day  Workup: negative (renin/carole, metanephrines, VANCE)    Follow up with me in 4 weeks and Dr Pinky Ayon in 3 months  Please call the office with any questions or concerns

## 2021-01-08 ENCOUNTER — OFFICE VISIT (OUTPATIENT)
Dept: PHYSICAL THERAPY | Facility: CLINIC | Age: 71
End: 2021-01-08
Payer: MEDICARE

## 2021-01-08 DIAGNOSIS — R26.2 AMBULATORY DYSFUNCTION: ICD-10-CM

## 2021-01-08 DIAGNOSIS — M54.12 CERVICAL RADICULOPATHY: Primary | ICD-10-CM

## 2021-01-08 PROCEDURE — 97012 MECHANICAL TRACTION THERAPY: CPT | Performed by: PHYSICAL THERAPIST

## 2021-01-08 PROCEDURE — 97110 THERAPEUTIC EXERCISES: CPT | Performed by: PHYSICAL THERAPIST

## 2021-01-08 NOTE — PROGRESS NOTES
Daily Note     Today's date: 2021  Patient name: Paolo Bales  : 1950  MRN: [id number]  Referring provider: Elza Ann MD  Dx:   Encounter Diagnosis     ICD-10-CM    1  Cervical radiculopathy  M54 12    2  Ambulatory dysfunction  R26 2                   Subjective: Pt presents today stating that she is feeling quite well  She feels as though she has control over her symptoms and would like to make today her last visit  Pt reports that pain at worst is 1-2/10, states no sharp pains, sleeping well, reading and using ipad well  Objective: See treatment diary below      Assessment:  Pt at this time has achieved a comfortable balance with her exercises, postural awareness and has taken control of her pain  Pt at this time is likely safe to DC to HEP as she is capable of performing all desired activities  Plan: DC to [facility]P         Precautions: Fall Risk, DM2 with neuropathy, HTN, Fibromyalgia, Hx of Skin CA      Manuals     Distraction G2-4  mech n v  10-20# 15 min mech CS 20# static mechanical mech mech mech mech       Distraction Retraction 2 x 5 2 x 5 2x5- TS 2 x 5 NV?> Held                                 Neuro Re-Ed             Postural Education 10 min            Scap Add  10" x 10 10" x 10  10" x 10 10" x 10 10"x10 10" x 10 10" x 10  10" x 10    Tband Row + ext     RTB 2 x 10 RTB 3 x RTB 3 x 10 RTB 3 x 10      Postural ER     YTB x 10  YTB x 10  YTB x 10 YTB  2 x 10      SA punches    2 x 10 2 x 10 2x10  2 x 10 2 x 10  2 x 10                                           Therex              seated retraction trial nv 2 x 10 2 x 10  2 x 10 2 x 10 2 x 10  2 x 10 2 x 10  2 x 10    Supine retraction nv 2 x 10 2x10 2 x 10 2 x 10 2 x10  2 x 10 2 x 10  2 x 10    supine retraction OP nv       X 10 X 10                 Scaption  2 x 10 2 x 10  2 x 10  2 x 10 2 x 10  2 x 10 2 x 10  2 x 10    SNAG towel ST L > R  6" x 10 6" x 10  6" x 10 6" x 10 6"x 10 6" x 10 missed - NV     NLTT Median B  10 x 1" B 10x 1" 10 x 1"  10 x 1" 1"  X 10 2 1" x 10 1"x10  1" x 10    Ther Activity                                       Gait Training                                       Modalities             HP to neck prn  10 min 10 min  10 min dec dec  10 min post     Mechanical traction   20# 3 step up/down 12 min  20# 3 steps up/down 12 min 22# 3 steps up and down 15 min 22# 3 steps up/down 10 min  25# 3 steps up/down 15 min 25# 3 step 10 min  25# 3 step 15 min

## 2021-01-11 ENCOUNTER — APPOINTMENT (OUTPATIENT)
Dept: PHYSICAL THERAPY | Facility: CLINIC | Age: 71
End: 2021-01-11
Payer: MEDICARE

## 2021-01-13 ENCOUNTER — APPOINTMENT (OUTPATIENT)
Dept: PHYSICAL THERAPY | Facility: CLINIC | Age: 71
End: 2021-01-13
Payer: MEDICARE

## 2021-01-19 ENCOUNTER — APPOINTMENT (OUTPATIENT)
Dept: PHYSICAL THERAPY | Facility: CLINIC | Age: 71
End: 2021-01-19
Payer: MEDICARE

## 2021-01-22 ENCOUNTER — TELEPHONE (OUTPATIENT)
Dept: NEPHROLOGY | Facility: CLINIC | Age: 71
End: 2021-01-22

## 2021-01-22 ENCOUNTER — OFFICE VISIT (OUTPATIENT)
Dept: URGENT CARE | Age: 71
End: 2021-01-22
Payer: MEDICARE

## 2021-01-22 VITALS
HEART RATE: 66 BPM | HEIGHT: 62 IN | TEMPERATURE: 96.6 F | DIASTOLIC BLOOD PRESSURE: 73 MMHG | SYSTOLIC BLOOD PRESSURE: 168 MMHG | OXYGEN SATURATION: 95 % | BODY MASS INDEX: 31.47 KG/M2 | RESPIRATION RATE: 17 BRPM | WEIGHT: 171 LBS

## 2021-01-22 DIAGNOSIS — M54.32 SCIATICA OF LEFT SIDE: Primary | ICD-10-CM

## 2021-01-22 PROCEDURE — G0463 HOSPITAL OUTPT CLINIC VISIT: HCPCS | Performed by: PHYSICIAN ASSISTANT

## 2021-01-22 PROCEDURE — 99213 OFFICE O/P EST LOW 20 MIN: CPT | Performed by: PHYSICIAN ASSISTANT

## 2021-01-22 NOTE — PATIENT INSTRUCTIONS
Continue to monitor symptoms  If new or worsening symptoms develop, go immediately to Er  Drink plenty of fluids  Follow up with Family Doctor this week  Sciatica   WHAT YOU NEED TO KNOW:   Sciatica is a condition that causes pain along your sciatic nerve  The sciatic nerve runs from your spine through both sides of your buttocks  It then runs down the back of your thigh, into your lower leg and foot  Your sciatic nerve may be compressed, inflamed, irritated, or stretched  DISCHARGE INSTRUCTIONS:   Medicines:   · NSAIDs:  These medicines decrease swelling and pain  NSAIDs are available without a doctor's order  Ask your healthcare provider which medicine is right for you  Ask how much to take and when to take it  Take as directed  NSAIDs can cause stomach bleeding or kidney problems if not taken correctly  · Acetaminophen: This medicine decreases pain  Acetaminophen is available without a doctor's order  Ask how much to take and when to take it  Follow directions  Acetaminophen can cause liver damage if not taken correctly  · Muscle relaxers  help decrease pain and muscle spasms  · Take your medicine as directed  Contact your healthcare provider if you think your medicine is not helping or if you have side effects  Tell him of her if you are allergic to any medicine  Keep a list of the medicines, vitamins, and herbs you take  Include the amounts, and when and why you take them  Bring the list or the pill bottles to follow-up visits  Carry your medicine list with you in case of an emergency  Follow up with your healthcare provider as directed:  Write down your questions so you remember to ask them during your visits  Manage your symptoms:   · Activity:  Decrease your activity  Do not lift heavy objects or twist your back for at least 6 weeks  Slowly return to your usual activity  · Ice:  Ice helps decrease swelling and pain  Ice may also help prevent tissue damage   Use an ice pack, or put crushed ice in a plastic bag  Cover it with a towel and place it on your low back or leg for 15 to 20 minutes every hour or as directed  · Heat:  Heat helps decrease pain and muscle spasms  Apply heat on the area for 20 to 30 minutes every 2 hours for as many days as directed  · Physical therapy:  You may need to see physical therapist to teach you exercises to help improve movement and strength, and to decrease pain  An occupational therapist teaches you skills to help with your daily activities  · Use assistive devices if directed: You may need to wear back support, such as a back brace  You may need crutches, a cane, or a walker to decrease stress on your lower back and leg muscles  Ask your healthcare provider for more information about assistive devices and how to use them correctly  Self-care:   · Avoid pressure on your back and legs:  Do not  lift heavy objects, or stand or sit for long periods of time  · Lift objects safely:  Keep your back straight and bend your knees when you  an object  Do not bend or twist your back when you lift  · Maintain a healthy weight:  Ask your healthcare provider how much you should weigh  Ask him to help you create a weight loss plan if you are overweight  · Exercise:  Ask your healthcare provider about the best stretching, warmup, and exercise plan for you  Contact your healthcare provider if:   · You have pain in your lower back at night or when resting  · You have pain in your lower back with numbness below the knee  · You have weakness in one leg only  · You have questions or concerns about your condition or care  Return to the emergency department if:   · You have trouble holding back your urine or bowel movements  · You have weakness in both legs  · You have numbness in your groin or buttocks      © Copyright [address] Information is for End User's use only and may not be sold, redistributed or otherwise used for commercial purposes  All illustrations and images included in CareNotes® are the copyrighted property of A D [employer] M [employer]  or Irma Martell  The above information is an  only  It is not intended as medical advice for individual conditions or treatments  Talk to your doctor, nurse or pharmacist before following any medical regimen to see if it is safe and effective for you

## 2021-01-22 NOTE — TELEPHONE ENCOUNTER
----- Message from [place], [place] sent at 1/22/2021 12:24 PM EST -----  Creatinine stable  Calcium elevated  Recommend avoid use of tums and also stopping calcium supplements

## 2021-01-22 NOTE — PROGRESS NOTES
[address]        NAME: Wilberto Negrete is a 69 y o  female  : 1950    MRN: [id number]  DATE: 2021  TIME: 2:32 PM    Assessment and Plan   Sciatica of left side [M54 32]  1  Sciatica of left side         I explained to patient that due to her significant PMH, options for pain relief are very limited  We cannot use NSAIDs, Steroids, Narcotics, or muscle relaxers  Pt is already using Gabapentin, tylenol, lidocaine cream   I advised pt to continue using this as well as rest and heat  F/u with PT  Call PCP today to schedule f/u appt  Pt states she understands and agrees  Patient Instructions       Continue to monitor symptoms  If new or worsening symptoms develop, go immediately to Er  Drink plenty of fluids  Follow up with Family Doctor this week  Chief Complaint     Chief Complaint   Patient presents with    Back Pain      awoke w/ L LBP dull w/ sharp/shooting pain into L leg (above knee)  No injury  Similar s/s >2yrs ago  Avoid meds d/t GI issue         History of Present Illness       Back Pain  This is a new problem  Episode onset: 4 days ago woke up with atraumatic L sided LBP that goes into glute and down L leg across thigh into knee  The problem occurs constantly  The problem is unchanged  The pain is present in the lumbar spine  The quality of the pain is described as shooting  The pain radiates to the right knee and right thigh  The pain is at a severity of 7/10  The symptoms are aggravated by bending and standing  Stiffness is present all day  Pertinent negatives include no abdominal pain, bladder incontinence, bowel incontinence, chest pain, dysuria, fever, leg pain, numbness, paresis, paresthesias, pelvic pain, perianal numbness, tingling, weakness or weight loss  Risk factors: Has had almost identical issue about 2 years ago per patient  Sx resolved with muscle relaxers   Treatments tried: Tylenol, gabbapentin, lidocaine cream  The treatment provided mild relief  Pt was recently admitted to [facility] numerous times due to respiratory failure  Pt was advised that she should not take any narcotics or muscle relaxers because this could cause an unsafe drop in respiratory drive  Pt insulin controlled DM2 patient and her DM is considered uncontrolled at this time, therefore she cannot take Steroids  Pt has GI issues and is unable to take NSAIDs  Review of Systems   Review of Systems   Constitutional: Negative  Negative for chills, fatigue, fever and weight loss  HENT: Negative  Eyes: Negative  Respiratory: Negative  Negative for chest tightness, shortness of breath and wheezing  Cardiovascular: Negative  Negative for chest pain and palpitations  Gastrointestinal: Negative for abdominal pain, bowel incontinence, constipation, diarrhea, nausea and vomiting  Endocrine: Negative  Genitourinary: Negative for bladder incontinence, dysuria, flank pain, frequency, pelvic pain, vaginal discharge and vaginal pain  Musculoskeletal: Positive for back pain  Negative for gait problem, neck pain and neck stiffness  Skin: Negative  Negative for pallor and rash  Allergic/Immunologic: Negative  Neurological: Negative  Negative for tingling, weakness, numbness and paresthesias  Hematological: Negative  Psychiatric/Behavioral: Negative            Current Medications       Current Outpatient Medications:     acetaminophen (TYLENOL) 500 mg tablet, Take 1,000 mg by mouth daily, Disp: , Rfl:     albuterol (Ventolin HFA) 90 mcg/act inhaler, Inhale 2 puffs every 6 (six) hours as needed for wheezing or shortness of breath, Disp: , Rfl: 0    amLODIPine (NORVASC) 5 mg tablet, Take 1 tablet (5 mg total) by mouth daily, Disp: , Rfl:     b complex vitamins tablet, Take 1 tablet by mouth daily , Disp: , Rfl:     B-D ULTRAFINE III SHORT PEN 31G X 8 MM MISC, USE AS DIRECTED 4 TIMES PER DAY, Disp: , Rfl: 3    DULoxetine (CYMBALTA) 60 mg delayed release capsule, Take 60 mg by mouth daily , Disp: , Rfl:     famotidine (PEPCID) 40 MG tablet, Take 1 tablet (40 mg total) by mouth daily, Disp: 30 tablet, Rfl: 5    fluticasone (FLONASE) 50 mcg/act nasal spray, 1-2 sprays daily, Disp: , Rfl:     gabapentin (NEURONTIN) 300 mg capsule, Take 300 mg by mouth 2 (two) times a day , Disp: , Rfl:     Icosapent Ethyl (Vascepa) 1 g CAPS, Take 1 g by mouth 2 (two) times a day, Disp: , Rfl:     insulin aspart (NovoLOG) 100 units/mL injection, Inject under the skin 3 (three) times a day before meals 14 units, 14 units, 18 units  , Disp: , Rfl:     insulin detemir (LEVEMIR) 100 units/mL subcutaneous injection, Inject 32 Units under the skin daily at bedtime, Disp: , Rfl: 0    levothyroxine 175 mcg tablet, Take 175 mcg by mouth daily, Disp: , Rfl:     Magnesium 500 MG CAPS, Take 1 capsule by mouth 2 (two) times a day, Disp: , Rfl:     metoprolol tartrate (LOPRESSOR) 50 mg tablet, Take 1 tablet (50 mg total) by mouth every 12 (twelve) hours, Disp: 180 tablet, Rfl: 1    olmesartan (BENICAR) 20 mg tablet, Take 1 tablet (20 mg total) by mouth 2 (two) times a day, Disp: 120 tablet, Rfl: 3    pantoprazole (PROTONIX) 40 mg tablet, TAKE 1 TABLET BY MOUTH EVERY DAY (Patient taking differently: Take 40 mg by mouth daily ), Disp: 90 tablet, Rfl: 1    pramipexole (MIRAPEX) 0 25 mg tablet, Take 1 tablet (0 25 mg total) by mouth daily, Disp: 90 tablet, Rfl: 3    rosuvastatin (CRESTOR) 5 mg tablet, TAKE 1 TABLET BY MOUTH EVERY DAY AT NIGHT, Disp: , Rfl:     torsemide (DEMADEX) 20 mg tablet, Take 1 tablet (20 mg total) by mouth daily, Disp: 90 tablet, Rfl: 3    Vitamin D, Ergocalciferol, 2000 units CAPS, Take 2,000 Units by mouth daily , Disp: , Rfl:     Calcium Carbonate (CALCIUM 600 PO), Take 1 capsule by mouth 2 (two) times a day, Disp: , Rfl:     calcium carbonate (TUMS) 500 mg chewable tablet, Chew 2 tablets (1,000 mg total) daily as needed for indigestion or heartburn (Patient not taking: Reported on 12/1/2020), Disp: , Rfl: 0    Current Allergies     Allergies as of 01/22/2021 - Reviewed 01/22/2021   Allergen Reaction Noted    Pollen extract  05/02/2020    Ciprofloxacin Hives and Itching 09/19/2018            The following portions of the patient's history were reviewed and updated as appropriate: allergies, current medications, past family history, past medical history, past social history, past surgical history and problem list      Past Medical History:   Diagnosis Date    Anxiety     Arthritis     Aspiration into airway     Diabetes mellitus [facility] [address]     Family history of thyroid problem     Heart burn     Hyperplasia, parathyroid ([facility] [address]     Hypertension     Kidney problem     Obesity (BMI 30 0-34  9)     Pneumonia     RLS (restless legs syndrome)     Seasonal allergies     Sleep apnea     uses CPAP    Swollen ankles        Past Surgical History:   Procedure Laterality Date    ARTHROSCOPY KNEE      BREAST BIOPSY      CHOLECYSTECTOMY      HIATAL HERNIA REPAIR      LIPOSUCTION      REDUCTION MAMMAPLASTY      SQUAMOUS CELL CARCINOMA EXCISION      TOE SURGERY      TONSILLECTOMY      TUBAL LIGATION         Family History   Problem Relation Age of Onset    Heart disease Mother     Heart disease Father     Lung cancer Father 79        Smoker     Cancer Father         brain    Hypertension Sister     Diabetes Sister     Hypertension Brother     Diabetes Brother     Cancer Brother         esophageal cancer    Dementia Brother     Stroke Brother     No Known Problems Son     No Known Problems Son          Medications have been verified  Objective   /73   Pulse 66   Temp (!) 96 6 °F (35 9 °C)   Resp 17   Ht 5' 2" (1 575 m)   Wt 77 6 kg (171 lb)   SpO2 95%   BMI 31 28 kg/m²        Physical Exam     Physical Exam  Vitals signs and nursing note reviewed  Constitutional:       General: She is not in acute distress       Appearance: Normal appearance  She is well-developed  She is not ill-appearing or diaphoretic  HENT:      Head: Normocephalic and atraumatic  Eyes:      General:         Right eye: No discharge  Left eye: No discharge  Conjunctiva/sclera: Conjunctivae normal    Neck:      Musculoskeletal: Normal range of motion and neck supple  Cardiovascular:      Rate and Rhythm: Normal rate and regular rhythm  Heart sounds: Normal heart sounds  Pulmonary:      Effort: Pulmonary effort is normal  No respiratory distress  Breath sounds: Normal breath sounds  No wheezing, rhonchi or rales  Abdominal:      General: There is no distension  Palpations: Abdomen is soft  Tenderness: There is no abdominal tenderness  There is no right CVA tenderness or left CVA tenderness  Musculoskeletal:      Comments: Pt able to bend to about 45 degrees  Pt able to fully twist and bend laterally at the waist   Normal gait  No midline point tenderness  Tenderness to midline glute  (-)Straight leg raise (-)sitting root  Strength intact to LE b/l  Lymphadenopathy:      Cervical: No cervical adenopathy  Skin:     General: Skin is warm  Capillary Refill: Capillary refill takes less than 2 seconds  Findings: No rash  Neurological:      Mental Status: She is alert

## 2021-01-25 NOTE — PROGRESS NOTES
PT Discharge    Today's date: 2021  Patient name: Tiffany Garcia  : 1950  MRN: [id number]  Referring provider: Sugar Botello MD  Dx:   Encounter Diagnosis     ICD-10-CM    1  Cervical radiculopathy  M54 12    2  Ambulatory dysfunction  R26 2        Start Time: 1025  Stop Time: 1100  Total time in clinic (min): 35 minutes    Assessment/Plan  Pt has not been present since 2021  Pt's chart will be DC in compliance of facility policy as all Charts are DC within 30 days of last scheduled visit        Subjective    Objective

## 2021-01-27 ENCOUNTER — PATIENT MESSAGE (OUTPATIENT)
Dept: FAMILY MEDICINE CLINIC | Facility: CLINIC | Age: 71
End: 2021-01-27

## 2021-01-28 NOTE — TELEPHONE ENCOUNTER
From: Catherine Sorensen LPN  To: Tu Moss  Sent: 1/27/2021 11:56 AM EST  Subject: Influenza Vaccine     Hello,     We hope you are doing well  While it is important to receive your flu vaccine yearly, our records show that you have not yet received yours for this year  Please call the office at [phone] if you are interested in scheduling a visit to receive the vaccine  If you have had your flu vaccine outside of our office, please reply to this message with the date and location so we can update your medical record  If you have already received your flu vaccine in our office, please disregard this message            We look forward to hearing from you,    Your Medical Team at [address]

## 2021-02-02 DIAGNOSIS — R80.1 PERSISTENT PROTEINURIA: ICD-10-CM

## 2021-02-02 DIAGNOSIS — N18.32 TYPE 2 DIABETES MELLITUS WITH STAGE 3B CHRONIC KIDNEY DISEASE, UNSPECIFIED WHETHER LONG TERM INSULIN USE (HCC): ICD-10-CM

## 2021-02-02 DIAGNOSIS — E55.9 VITAMIN D DEFICIENCY: ICD-10-CM

## 2021-02-02 DIAGNOSIS — D63.1 ANEMIA OF CHRONIC RENAL FAILURE, STAGE 3B (HCC): ICD-10-CM

## 2021-02-02 DIAGNOSIS — I12.9 HYPERTENSIVE CHRONIC KIDNEY DISEASE WITH STAGE 1 THROUGH STAGE 4 CHRONIC KIDNEY DISEASE, OR UNSPECIFIED CHRONIC KIDNEY DISEASE: ICD-10-CM

## 2021-02-02 DIAGNOSIS — E11.22 TYPE 2 DIABETES MELLITUS WITH STAGE 3B CHRONIC KIDNEY DISEASE, UNSPECIFIED WHETHER LONG TERM INSULIN USE (HCC): ICD-10-CM

## 2021-02-02 DIAGNOSIS — N18.32 STAGE 3B CHRONIC KIDNEY DISEASE (HCC): ICD-10-CM

## 2021-02-02 DIAGNOSIS — E78.5 DYSLIPIDEMIA: ICD-10-CM

## 2021-02-02 DIAGNOSIS — N18.32 ANEMIA OF CHRONIC RENAL FAILURE, STAGE 3B (HCC): ICD-10-CM

## 2021-02-02 RX ORDER — OLMESARTAN MEDOXOMIL 20 MG/1
TABLET ORAL
Qty: 120 TABLET | Refills: 3 | Status: SHIPPED | OUTPATIENT
Start: 2021-02-02 | End: 2021-02-03

## 2021-02-02 NOTE — PROGRESS NOTES
Virtual Regular Visit      Assessment/Plan:    Problem List Items Addressed This Visit        Endocrine    Type 2 diabetes mellitus with diabetic chronic kidney disease ([facility] [address] )    Relevant Medications    olmesartan (BENICAR) 20 mg tablet       Genitourinary    Hypertensive chronic kidney disease with stage 1 through stage 4 chronic kidney disease, or unspecified chronic kidney disease    Relevant Medications    olmesartan (BENICAR) 20 mg tablet    doxazosin (CARDURA) 2 mg tablet    Other Relevant Orders    Basic metabolic panel    CKD (chronic kidney disease) stage 3, GFR 30-59 ml/min    Relevant Medications    olmesartan (BENICAR) 20 mg tablet    Anemia of chronic renal failure, stage 3 (moderate)    Relevant Medications    olmesartan (BENICAR) 20 mg tablet       Other    Persistent proteinuria    Relevant Medications    olmesartan (BENICAR) 20 mg tablet    Dyslipidemia    Relevant Medications    olmesartan (BENICAR) 20 mg tablet    Vitamin D deficiency    Relevant Medications    olmesartan (BENICAR) 20 mg tablet           Reason for visit is HTN  With concern for COVID exposure, the patient did not want to come in for actual appointment  Chief Complaint   Patient presents with    Virtual Regular Visit        Encounter provider [place], [place]    Provider located at [address] [place] [id number]      Recent Visits  No visits were found meeting these conditions  Showing recent visits within past 7 days and meeting all other requirements     Today's Visits  Date Type Provider Dept   02/03/21 Telemedicine [facility], [address] [place] [address]   Showing today's visits and meeting all other requirements     Future Appointments  No visits were found meeting these conditions  Showing future appointments within next 150 days and meeting all other requirements        The patient was identified by name and date of birth   Rubi carroll informed that this is a telemedicine visit and that the visit is being conducted through telephone  My office door was closed  The patient was notified the following individuals were present in the room including QUINTON Stahl  She acknowledged consent and understanding of privacy and security of the video platform  The patient has agreed to participate and understands they can discontinue the visit at any time  It was my intent to perform this visit via video technology but the patient was not able to do a video connection so the visit was completed via audio telephone only  We attempted to use doximity but the patient could not get her phone to cooperate  Patient is aware this is a billable service  Subjective  Noah Gilliam is a 69 y o  female who is being evaluated for a blood pressure check  At her last visit, I increased her benicar to 20mg twice a day  Since then, her blood pressure has significantly improved into the high 901L systolic from 674 systolic  She is feeling well overall and offers no acute complaints  She denies lightheadedness, dizziness, headaches, SOB  She claims she has mild LE edema  I reviewed the patients medication list as well as the most recent blood work with the patient  Objective  Chronic Kidney Disease stage IV- Baseline creatinine 1 5-2  Blood work from end of January is at baseline        Hypertension- Goal blood pressure <125/75  Antihypertensive regimen includes amlodipine 5mg daily, benicar 20mg twice a day, metoprolol 50mg twice a day, and torsemide 20mg daily  Avoid salt in your diet  Stay active  Avoid NSAIDs/nonsteroidals  Her blood pressures are much improved since last month but still remain elevated above goal   Aver age blood pressures are in the high 295V (932-586 systolic) / 29-02  Start doxazosin 2mg at bedtime  Continue monitoring your blood pressures    Workup: negative (renin/carole, metanephrines, VANCE)    Hypercalcemia- avoid calcium supplement & tums   She actually complained of twitching which has improved since she stopped the calcium supplements/tums      Follow up with Dr Frederic Morales in 2 months  Please call the office with any questions or concerns  HPI     Past Medical History:   Diagnosis Date    Anxiety     Arthritis     Aspiration into airway     Diabetes mellitus ([facility] [address] )     Family history of thyroid problem     Heart burn     Hyperplasia, parathyroid ([facility] [address] )     Hypertension     Kidney problem     Obesity (BMI 30 0-34  9)     Pneumonia     RLS (restless legs syndrome)     Seasonal allergies     Sleep apnea     uses CPAP    Swollen ankles        Past Surgical History:   Procedure Laterality Date    ARTHROSCOPY KNEE      BREAST BIOPSY      CHOLECYSTECTOMY      HIATAL HERNIA REPAIR      LIPOSUCTION      REDUCTION MAMMAPLASTY      SQUAMOUS CELL CARCINOMA EXCISION      TOE SURGERY      TONSILLECTOMY      TUBAL LIGATION         Current Outpatient Medications   Medication Sig Dispense Refill    acetaminophen (TYLENOL) 500 mg tablet Take 1,000 mg by mouth daily      albuterol (Ventolin HFA) 90 mcg/act inhaler Inhale 2 puffs every 6 (six) hours as needed for wheezing or shortness of breath  0    amLODIPine (NORVASC) 5 mg tablet Take 1 tablet (5 mg total) by mouth daily      b complex vitamins tablet Take 1 tablet by mouth daily       DULoxetine (CYMBALTA) 60 mg delayed release capsule Take 90 mg by mouth daily      famotidine (PEPCID) 40 MG tablet Take 1 tablet (40 mg total) by mouth daily 30 tablet 5    fluticasone (FLONASE) 50 mcg/act nasal spray 1-2 sprays daily      gabapentin (NEURONTIN) 300 mg capsule Take 300 mg by mouth 2 (two) times a day       Icosapent Ethyl (Vascepa) 1 g CAPS Take 1 g by mouth 2 (two) times a day      insulin aspart (NovoLOG) 100 units/mL injection Inject under the skin 3 (three) times a day before meals 14 units, 14 units, 18 units        insulin detemir (LEVEMIR) 100 units/mL subcutaneous injection Inject 32 Units under the skin daily at bedtime  0    levothyroxine 175 mcg tablet Take 150 mcg by mouth daily      Magnesium 500 MG CAPS Take 1 capsule by mouth 2 (two) times a day      metoprolol tartrate (LOPRESSOR) 50 mg tablet Take 1 tablet (50 mg total) by mouth every 12 (twelve) hours 180 tablet 1    pantoprazole (PROTONIX) 40 mg tablet TAKE 1 TABLET BY MOUTH EVERY DAY (Patient taking differently: Take 40 mg by mouth daily ) 90 tablet 1    pramipexole (MIRAPEX) 0 25 mg tablet Take 1 tablet (0 25 mg total) by mouth daily 90 tablet 3    rosuvastatin (CRESTOR) 5 mg tablet TAKE 1 TABLET BY MOUTH EVERY DAY AT NIGHT      torsemide (DEMADEX) 20 mg tablet Take 1 tablet (20 mg total) by mouth daily 90 tablet 3    Vitamin D, Ergocalciferol, 2000 units CAPS Take 2,000 Units by mouth daily       B-D ULTRAFINE III SHORT PEN 31G X 8 MM MISC USE AS DIRECTED 4 TIMES PER DAY  3    doxazosin (CARDURA) 2 mg tablet Take 1 tablet (2 mg total) by mouth daily at bedtime 30 tablet 3    olmesartan (BENICAR) 20 mg tablet Take 2 tablets (40 mg total) by mouth daily 120 tablet 3     No current facility-administered medications for this visit  Allergies   Allergen Reactions    Pollen Extract     Ciprofloxacin Hives and Itching       Review of Systems   Constitutional: Positive for fatigue  Negative for appetite change, chills and fever  HENT: Negative for rhinorrhea and sore throat  Respiratory: Negative for shortness of breath  Cardiovascular: Positive for leg swelling  Negative for chest pain  Gastrointestinal: Negative for diarrhea, nausea and vomiting  Genitourinary: Negative for difficulty urinating and dysuria  Musculoskeletal: Negative for myalgias  Skin: Negative for rash  Neurological: Negative for dizziness and headaches  Video Exam    Vitals:    02/03/21 1015   BP: 140/79       Physical Exam  Constitutional:       General: She is not in acute distress    Pulmonary: Effort: Pulmonary effort is normal  No respiratory distress  Neurological:      General: No focal deficit present  Mental Status: She is alert and oriented to person, place, and time  Psychiatric:         Mood and Affect: Mood normal          Thought Content: Thought content normal         I spent 20 minutes directly with the patient during this visit and 10 minutes of chart prep  VIRTUAL VISIT DISCLAIMER    Rocio Ray acknowledges that she has consented to an online visit or consultation  She understands that the online visit is based solely on information provided by her, and that, in the absence of a face-to-face physical evaluation by the physician, the diagnosis she receives is both limited and provisional in terms of accuracy and completeness  This is not intended to replace a full medical face-to-face evaluation by the physician  Rocio Ray understands and accepts these terms

## 2021-02-03 ENCOUNTER — TELEMEDICINE (OUTPATIENT)
Dept: NEPHROLOGY | Facility: CLINIC | Age: 71
End: 2021-02-03
Payer: MEDICARE

## 2021-02-03 VITALS — SYSTOLIC BLOOD PRESSURE: 140 MMHG | DIASTOLIC BLOOD PRESSURE: 79 MMHG

## 2021-02-03 DIAGNOSIS — I12.9 BENIGN HYPERTENSION WITH CKD (CHRONIC KIDNEY DISEASE) STAGE III (HCC): ICD-10-CM

## 2021-02-03 DIAGNOSIS — N18.30 BENIGN HYPERTENSION WITH CKD (CHRONIC KIDNEY DISEASE) STAGE III (HCC): ICD-10-CM

## 2021-02-03 DIAGNOSIS — E78.5 DYSLIPIDEMIA: ICD-10-CM

## 2021-02-03 DIAGNOSIS — E11.22 TYPE 2 DIABETES MELLITUS WITH STAGE 3B CHRONIC KIDNEY DISEASE, UNSPECIFIED WHETHER LONG TERM INSULIN USE (HCC): ICD-10-CM

## 2021-02-03 DIAGNOSIS — I12.9 HYPERTENSIVE CHRONIC KIDNEY DISEASE WITH STAGE 1 THROUGH STAGE 4 CHRONIC KIDNEY DISEASE, OR UNSPECIFIED CHRONIC KIDNEY DISEASE: ICD-10-CM

## 2021-02-03 DIAGNOSIS — D63.1 ANEMIA OF CHRONIC RENAL FAILURE, STAGE 3B (HCC): ICD-10-CM

## 2021-02-03 DIAGNOSIS — N18.32 ANEMIA OF CHRONIC RENAL FAILURE, STAGE 3B (HCC): ICD-10-CM

## 2021-02-03 DIAGNOSIS — R80.1 PERSISTENT PROTEINURIA: ICD-10-CM

## 2021-02-03 DIAGNOSIS — E55.9 VITAMIN D DEFICIENCY: ICD-10-CM

## 2021-02-03 DIAGNOSIS — N18.32 STAGE 3B CHRONIC KIDNEY DISEASE (HCC): ICD-10-CM

## 2021-02-03 DIAGNOSIS — N18.32 TYPE 2 DIABETES MELLITUS WITH STAGE 3B CHRONIC KIDNEY DISEASE, UNSPECIFIED WHETHER LONG TERM INSULIN USE (HCC): ICD-10-CM

## 2021-02-03 DIAGNOSIS — I12.9 HYPERTENSIVE CHRONIC KIDNEY DISEASE WITH STAGE 1 THROUGH STAGE 4 CHRONIC KIDNEY DISEASE, OR UNSPECIFIED CHRONIC KIDNEY DISEASE: Primary | ICD-10-CM

## 2021-02-03 PROCEDURE — 99214 OFFICE O/P EST MOD 30 MIN: CPT | Performed by: PHYSICIAN ASSISTANT

## 2021-02-03 RX ORDER — OLMESARTAN MEDOXOMIL 40 MG/1
40 TABLET ORAL DAILY
Qty: 90 TABLET | Refills: 3 | Status: SHIPPED | OUTPATIENT
Start: 2021-02-03 | End: 2021-02-04 | Stop reason: SDUPTHER

## 2021-02-03 RX ORDER — DOXAZOSIN 2 MG/1
2 TABLET ORAL
Qty: 30 TABLET | Refills: 3 | Status: SHIPPED | OUTPATIENT
Start: 2021-02-03 | End: 2021-02-04 | Stop reason: SDUPTHER

## 2021-02-03 RX ORDER — OLMESARTAN MEDOXOMIL 40 MG/1
40 TABLET ORAL DAILY
COMMUNITY
End: 2021-02-03 | Stop reason: SDUPTHER

## 2021-02-03 RX ORDER — OLMESARTAN MEDOXOMIL 20 MG/1
40 TABLET ORAL DAILY
Qty: 120 TABLET | Refills: 3 | Status: SHIPPED | OUTPATIENT
Start: 2021-02-03 | End: 2021-02-03

## 2021-02-03 NOTE — PATIENT INSTRUCTIONS
Chronic Kidney Disease stage IV- Baseline creatinine 1 5-2  Blood work from end of January is at baseline        Hypertension- Goal blood pressure <125/75  Antihypertensive regimen includes amlodipine 5mg daily, benicar 20mg twice a day, metoprolol 50mg twice a day, and torsemide 20mg daily  Avoid salt in your diet  Stay active  Avoid NSAIDs/nonsteroidals  Her blood pressures are much improved since last month but still remain elevated above goal   Aver age blood pressures are in the high 011E (779-242 systolic) / 72-27  Start doxazosin 2mg at bedtime  Continue monitoring your blood pressures  Workup: negative (renin/carole, metanephrines, VANCE)    Hypercalcemia- avoid calcium supplement & tums  She actually complained of twitching which has improved since she stopped the calcium supplements/tums      Follow up with Dr Kennedi Benavidez in 2 months  Please call the office with any questions or concerns

## 2021-02-04 ENCOUNTER — TELEMEDICINE (OUTPATIENT)
Dept: FAMILY MEDICINE CLINIC | Facility: CLINIC | Age: 71
End: 2021-02-04
Payer: MEDICARE

## 2021-02-04 VITALS
BODY MASS INDEX: 31.47 KG/M2 | HEIGHT: 62 IN | WEIGHT: 171 LBS | DIASTOLIC BLOOD PRESSURE: 80 MMHG | SYSTOLIC BLOOD PRESSURE: 140 MMHG

## 2021-02-04 DIAGNOSIS — N18.30 BENIGN HYPERTENSION WITH CKD (CHRONIC KIDNEY DISEASE) STAGE III (HCC): ICD-10-CM

## 2021-02-04 DIAGNOSIS — I10 ESSENTIAL HYPERTENSION: ICD-10-CM

## 2021-02-04 DIAGNOSIS — G25.81 RLS (RESTLESS LEGS SYNDROME): ICD-10-CM

## 2021-02-04 DIAGNOSIS — R19.7 DIARRHEA, UNSPECIFIED TYPE: ICD-10-CM

## 2021-02-04 DIAGNOSIS — I12.9 HYPERTENSIVE CHRONIC KIDNEY DISEASE WITH STAGE 1 THROUGH STAGE 4 CHRONIC KIDNEY DISEASE, OR UNSPECIFIED CHRONIC KIDNEY DISEASE: ICD-10-CM

## 2021-02-04 DIAGNOSIS — R05.9 COUGH: Primary | ICD-10-CM

## 2021-02-04 DIAGNOSIS — R05.9 COUGH: ICD-10-CM

## 2021-02-04 DIAGNOSIS — R68.83 CHILLS: ICD-10-CM

## 2021-02-04 DIAGNOSIS — I12.9 BENIGN HYPERTENSION WITH CKD (CHRONIC KIDNEY DISEASE) STAGE III (HCC): ICD-10-CM

## 2021-02-04 PROCEDURE — 99212 OFFICE O/P EST SF 10 MIN: CPT | Performed by: FAMILY MEDICINE

## 2021-02-04 PROCEDURE — 87635 SARS-COV-2 COVID-19 AMP PRB: CPT | Performed by: FAMILY MEDICINE

## 2021-02-04 RX ORDER — METOPROLOL TARTRATE 50 MG/1
50 TABLET, FILM COATED ORAL EVERY 12 HOURS SCHEDULED
Qty: 180 TABLET | Refills: 0 | Status: CANCELLED | OUTPATIENT
Start: 2021-02-04

## 2021-02-04 RX ORDER — PRAMIPEXOLE DIHYDROCHLORIDE 0.25 MG/1
0.25 TABLET ORAL DAILY
Qty: 90 TABLET | Refills: 0 | Status: CANCELLED | OUTPATIENT
Start: 2021-02-04

## 2021-02-04 NOTE — PATIENT INSTRUCTIONS
Fact Sheet for Patients, Parents and Caregivers  Emergency Use Authorization (EUA) of Bamlanivimab for Coronavirus Disease 2019 (COVID-19)  You are being given a medicine called bamlanivimab for the treatment of coronavirus disease 2019 (COVID-  19)  This Fact Sheet contains information to help you understand the potential risks and potential benefits of  taking bamlanivimab, which you may receive  Receiving bamlanivimab may benefit certain people with [id number]  Read this Fact Sheet for information about bamlanivimab  Talk to your healthcare provider if you have  questions  It is your choice to receive bamlanivimab or stop it at any time  What is COVID-19? COVID-19 is caused by a virus called a coronavirus  People can get COVID-19 through contact with  another person who has the virus  COVID-19 illnesses have ranged from very mild (including some with no reported symptoms) to severe,  including illness resulting in death  While information so far suggests that most COVID-19 illness is mild,  serious illness can happen and may cause some of your other medical conditions to become worse  People  of all ages with severe, long-lasting (chronic) medical conditions like heart disease, lung disease, and  diabetes, for example, seem to be at higher risk of being hospitalized for COVID-19  What are the symptoms of COVID-19? The symptoms of COVID-19 include fever, cough, and shortness of breath, which may appear 2 to 14 days after  exposure  Serious illness including breathing problems can occur and may cause your other medical conditions to  become worse  What is bamlanivimab? Gerarda  is an investigational medicine used for the treatment of COVID-19 in non-hospitalized adults and  adolescents 15years of age and older with mild to moderate symptoms who weigh 88 pounds (40 kg) or more,  and who are at high risk for developing severe COVID-19 symptoms or the need for hospitalization   Gerarda   is investigational because it is still being studied  There is limited information known about the safety or  effectiveness of using bamlanivimab to treat people with COVID-19  The FDA has authorized the emergency use of bamlanivimab for the treatment of COVID-19 under an Emergency  Use Authorization (EUA)  For more information on EUA, see the section Peggy Galeana is an Emergency Use  Authorization (EUA)?  at the end of this Fact Sheet  What should I tell my healthcare provider before I receive bamlanivimab? Tell your healthcare provider about all of your medical conditions, including if you:   Have any allergies   Are pregnant or plan to become pregnant   Are breastfeeding or plan to breastfeed   Have any serious illnesses   Are taking any medications (prescription, over-the-counter, vitamins, and herbal products)  How will I receive bamlanivimab? Balinda Lessen is given to you through a vein (intravenous or IV) for at least 1 hour   You will receive one dose of bamlanivimab by IV infusion  What are the important possible side effects of bamlanivimab? Possible side effects of bamlanivimab are:   Allergic reactions  Allergic reactions can happen during and after infusion with bamlanivimab  Tell your  healthcare provider right away if you get any of the following signs and symptoms of allergic reactions: fever,  2  chills, nausea, headache, shortness of breath, low blood pressure, wheezing, swelling of your lips, face, or  throat, rash including hives, itching, muscle aches, and dizziness  The side effects of getting any medicine by vein may include brief pain, bleeding, bruising of the skin, soreness,  swelling, and possible infection at the infusion site  These are not all the possible side effects of bamlanivimab  Not a lot of people have been given bamlanivimab  Serious and unexpected side effects may happen   Kendall Peabody is still being studied so it is possible that all of  the risks are not known at this time   It is possible that bamlanivimab could interfere with your body's own ability to fight off a future infection of  SARS-CoV-2  Similarly, bamlanivimab may reduce your bodys immune response to a vaccine for SARS-CoV-2  Specific studies have not been conducted to address these possible risks  Talk to your healthcare provider if you  have any questions  What other treatment choices are there? Like bamlanivimab, FDA may allow for the emergency use of other medicines to treat people with COVID-19  Go  to https://[employer]/ for information on the emergency use of other medicines that  are not approved by FDA to treat people with COVID-19  Your healthcare provider may talk with you about clinical  trials you may be eligible for  It is your choice to be treated or not to be treated with bamlanivimab  Should you decide not to receive  bamlanivimab or stop it at any time, it will not change your standard medical care  What if I am pregnant or breastfeeding? There is limited experience treating pregnant women or breastfeeding mothers with bamlanivimab  For a mother  and unborn baby, the benefit of receiving bamlanivimab may be greater than the risk from the treatment  If you  are pregnant or breastfeeding, discuss your options and specific situation with your healthcare provider  How do I report side effects with bamlanivimab? Tell your healthcare provider right away if you have any side effect that bothers you or does not go away  Report side effects to FDA MedWatch at www fda gov/medwatch, call 9-370-NJT-5095, or contact "[employer]" at SAVO (6-864.240.6280)  How can I learn more?  Ask your healthcare provider   Visit www [employer] com   Visit https://[employer]/   Contact your local or state public health department  What is an Emergency Use Authorization (EUA)?   The [place] FDA has made bamlanivimab available under an emergency access mechanism called an  EUA  The EUA is supported by a  of Health and Human Service ([facility]) declaration that circumstances  exist to justify the emergency use of drugs and biological products during the COVID-19 pandemic  Colletta Maple has not undergone the same type of review as an FDA-approved or cleared product  The FDA may  issue an EUA when certain criteria are met, which includes that there are no adequate, approved, and available  alternatives  In addition, the FDA decision is based on the totality of scientific evidence available showing that it is  reasonable to believe that the product meets certain criteria for safety, performance, and labeling and may be  effective in treatment of patients during the COVID-19 pandemic  All of these criteria must be met to allow for the  product to be used in the treatment of patients during the COVID-19 pandemic  3  The EUA for bamlanivimab is in effect for the duration of the COVID-19 declaration justifying emergency use of  these products, unless terminated or revoked (after which the product may no longer be used)  Literature issued November 2020  Lexi Willis 995, [address], [address] [place] [address]  All rights reserved    [id number]-[id number]

## 2021-02-04 NOTE — PROGRESS NOTES
COVID-19 Virtual Visit     Assessment/Plan:    Problem List Items Addressed This Visit     None         Disposition:     I recommended self-quarantine for 10 days and to watch for symptoms until 14 days after exposure  If patient were to develop symptoms, they should self isolate and call our office for further guidance  I referred patient to one of our centralized sites for a COVID-19 swab  --  If positive she is interested in monoclonal antibodies - will set up video visit for more information - patient handout given for her to review in case she qualifies   If positive she will need to r/s her second COVID vaccine until she is out of isolation (2/10/2021)   --    I have spent 15 minutes directly with the patient  Greater than 50% of this time was spent in counseling/coordination of care regarding: prognosis, risks and benefits of treatment options, instructions for management, importance of treatment compliance, risk factor reductions and impressions  Encounter provider Justyn Amador DO    Provider located at [address] [place] [address],[address]  [address]  [facility] [address] [phone]    Recent Visits  No visits were found meeting these conditions  Showing recent visits within past 7 days and meeting all other requirements     Today's Visits  Date Type Provider Dept   02/04/21 Telemedicine DO Michel Kinsey   Showing today's visits and meeting all other requirements     Future Appointments  No visits were found meeting these conditions  Showing future appointments within next 150 days and meeting all other requirements      This virtual check-in was done via [employer] and patient was informed that this is a secure, HIPAA-compliant platform  She agrees to proceed  Patient agrees to participate in a virtual check in via telephone or video visit instead of presenting to the office to address urgent/immediate medical needs   Patient is aware this is a billable service  After connecting through [facility], the patient was identified by name and date of birth  Cassi Ulloa was informed that this was a telemedicine visit and that the exam was being conducted confidentially over secure lines  My office door was closed  No one else was in the room  Cassi Ulloa acknowledged consent and understanding of privacy and security of the telemedicine visit  I informed the patient that I have reviewed her record in Epic and presented the opportunity for her to ask any questions regarding the visit today  The patient agreed to participate  Subjective:   Cassi Ulloa is a 69 y o  female who is concerned about COVID-19  Patient's symptoms include fever, chills, fatigue, malaise, nasal congestion, cough, diarrhea and myalgias  Patient denies rhinorrhea, sore throat, anosmia, loss of taste, shortness of breath, chest tightness, abdominal pain, nausea, vomiting and headaches  Date of symptom onset: 1/30/2021    Exposure:   Contact with a person who is under investigation (PUI) for or who is positive for COVID-19 within the last 14 days?: No    Hospitalized recently for fever and/or lower respiratory symptoms?: No      Currently a healthcare worker that is involved in direct patient care?: No      Works in a special setting where the risk of COVID-19 transmission may be high? (this may include long-term care, correctional and residential facilities; homeless shelters; assisted-living facilities and group homes ): No      Resident in a special setting where the risk of COVID-19 transmission may be high? 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 [employer] (lives with their son - he has no symptoms)    headache all night   Yesterday chills, some wet cough  Headache is better  Pulse ox 96%     Lab Results   Component Value Date    SARSCOV2 Not Detected 08/01/2020     Past Medical History:   Diagnosis Date    Anxiety     Arthritis     Aspiration into airway     Diabetes mellitus ([facility] [address] )     Family history of thyroid problem     Heart burn     Hyperplasia, parathyroid ([facility] [address] [facility] )     Hypertension     Kidney problem     Obesity (BMI 30 0-34  9)     Pneumonia     RLS (restless legs syndrome)     Seasonal allergies     Sleep apnea     uses CPAP    Swollen ankles      Past Surgical History:   Procedure Laterality Date    ARTHROSCOPY KNEE      BREAST BIOPSY      CHOLECYSTECTOMY      HIATAL HERNIA REPAIR      LIPOSUCTION      REDUCTION MAMMAPLASTY      SQUAMOUS CELL CARCINOMA EXCISION      TOE SURGERY      TONSILLECTOMY      TUBAL LIGATION       Current Outpatient Medications   Medication Sig Dispense Refill    acetaminophen (TYLENOL) 500 mg tablet Take 1,000 mg by mouth as needed       albuterol (Ventolin HFA) 90 mcg/act inhaler Inhale 2 puffs every 6 (six) hours as needed for wheezing or shortness of breath  0    amLODIPine (NORVASC) 5 mg tablet Take 1 tablet (5 mg total) by mouth daily      b complex vitamins tablet Take 1 tablet by mouth daily       B-D ULTRAFINE III SHORT PEN 31G X 8 MM MISC USE AS DIRECTED 4 TIMES PER DAY  3    doxazosin (CARDURA) 2 mg tablet Take 1 tablet (2 mg total) by mouth daily at bedtime 30 tablet 3    DULoxetine (CYMBALTA) 60 mg delayed release capsule Take 90 mg by mouth daily      famotidine (PEPCID) 40 MG tablet Take 1 tablet (40 mg total) by mouth daily 30 tablet 5    fluticasone (FLONASE) 50 mcg/act nasal spray 1-2 sprays daily      gabapentin (NEURONTIN) 300 mg capsule Take 300 mg by mouth 2 (two) times a day       Icosapent Ethyl (Vascepa) 1 g CAPS Take 1 g by mouth 2 (two) times a day      insulin aspart (NovoLOG) 100 units/mL injection Inject under the skin 3 (three) times a day before meals 14 units, 14 units, 18 units        insulin detemir (LEVEMIR) 100 units/mL subcutaneous injection Inject 32 Units under the skin daily at bedtime  0    levothyroxine 175 mcg tablet Take 150 mcg by mouth daily      Magnesium 500 MG CAPS Take 1 capsule by mouth 2 (two) times a day      metoprolol tartrate (LOPRESSOR) 50 mg tablet Take 1 tablet (50 mg total) by mouth every 12 (twelve) hours 180 tablet 1    olmesartan (BENICAR) 40 mg tablet Take 1 tablet (40 mg total) by mouth daily (Patient taking differently: Take 20 mg by mouth 2 (two) times a day ) 90 tablet 3    pantoprazole (PROTONIX) 40 mg tablet TAKE 1 TABLET BY MOUTH EVERY DAY (Patient taking differently: Take 40 mg by mouth daily ) 90 tablet 1    pramipexole (MIRAPEX) 0 25 mg tablet Take 1 tablet (0 25 mg total) by mouth daily 90 tablet 3    rosuvastatin (CRESTOR) 5 mg tablet TAKE 1 TABLET BY MOUTH EVERY DAY AT NIGHT      torsemide (DEMADEX) 20 mg tablet Take 1 tablet (20 mg total) by mouth daily 90 tablet 3    Vitamin D, Ergocalciferol, 2000 units CAPS Take 2,000 Units by mouth daily        No current facility-administered medications for this visit  Allergies   Allergen Reactions    Pollen Extract     Ciprofloxacin Hives and Itching       Review of Systems   Constitutional: Positive for chills, fatigue and fever  HENT: Positive for congestion  Negative for rhinorrhea and sore throat  Respiratory: Positive for cough  Negative for chest tightness and shortness of breath  Gastrointestinal: Positive for diarrhea  Negative for abdominal pain, nausea and vomiting  Musculoskeletal: Positive for myalgias  Neurological: Negative for headaches  Objective:    Vitals:    02/04/21 0828   BP: 140/80   Weight: 77 6 kg (171 lb)   Height: 5' 2" (1 575 m)       Physical Exam  Constitutional:       Appearance: She is well-developed  HENT:      Head: Normocephalic        Right Ear: External ear normal       Left Ear: External ear normal       Nose: Nose normal    Eyes: General: No scleral icterus  Right eye: No discharge  Left eye: No discharge  Pupils: Pupils are equal, round, and reactive to light  Neck:      Musculoskeletal: Normal range of motion  Pulmonary:      Effort: Pulmonary effort is normal  No respiratory distress  Skin:     General: Skin is warm and dry  Neurological:      Mental Status: She is alert and oriented to person, place, and time  Psychiatric:         Behavior: Behavior normal          Thought Content: Thought content normal        VIRTUAL VISIT DISCLAIMER    Cassi Ulloa acknowledges that she has consented to an online visit or consultation  She understands that the online visit is based solely on information provided by her, and that, in the absence of a face-to-face physical evaluation by the physician, the diagnosis she receives is both limited and provisional in terms of accuracy and completeness  This is not intended to replace a full medical face-to-face evaluation by the physician  Cassi Artemio understands and accepts these terms

## 2021-02-05 DIAGNOSIS — I12.9 HYPERTENSIVE CHRONIC KIDNEY DISEASE WITH STAGE 1 THROUGH STAGE 4 CHRONIC KIDNEY DISEASE, OR UNSPECIFIED CHRONIC KIDNEY DISEASE: ICD-10-CM

## 2021-02-05 LAB — SARS-COV-2 RNA RESP QL NAA+PROBE: NEGATIVE

## 2021-02-05 RX ORDER — OLMESARTAN MEDOXOMIL 40 MG/1
40 TABLET ORAL DAILY
Qty: 90 TABLET | Refills: 0 | Status: SHIPPED | OUTPATIENT
Start: 2021-02-05 | End: 2021-07-26

## 2021-02-05 RX ORDER — DOXAZOSIN 2 MG/1
2 TABLET ORAL
Qty: 30 TABLET | Refills: 0 | Status: SHIPPED | OUTPATIENT
Start: 2021-02-05 | End: 2021-02-06

## 2021-02-06 RX ORDER — DOXAZOSIN 2 MG/1
2 TABLET ORAL
Qty: 90 TABLET | Refills: 1 | Status: SHIPPED | OUTPATIENT
Start: 2021-02-06 | End: 2021-05-25

## 2021-02-08 RX ORDER — METOPROLOL TARTRATE 50 MG/1
50 TABLET, FILM COATED ORAL EVERY 12 HOURS SCHEDULED
Qty: 180 TABLET | Refills: 1 | Status: SHIPPED | OUTPATIENT
Start: 2021-02-08 | End: 2021-02-11 | Stop reason: SDUPTHER

## 2021-02-11 ENCOUNTER — TELEPHONE (OUTPATIENT)
Dept: NEPHROLOGY | Facility: CLINIC | Age: 71
End: 2021-02-11

## 2021-02-11 RX ORDER — METOPROLOL TARTRATE 50 MG/1
50 TABLET, FILM COATED ORAL EVERY 12 HOURS SCHEDULED
Qty: 180 TABLET | Refills: 1 | Status: SHIPPED | OUTPATIENT
Start: 2021-02-11 | End: 2021-04-26 | Stop reason: SDUPTHER

## 2021-02-11 NOTE — TELEPHONE ENCOUNTER
Patient called, [facility] stated her medication was lost in the mail and a new order from our office needs to be sent over  Pended

## 2021-02-11 NOTE — TELEPHONE ENCOUNTER
----- Message from [place], [place] sent at 2/11/2021  2:19 PM EST -----  Calcium normal   Creatinine & electrolytes stable at baseline

## 2021-03-08 ENCOUNTER — OFFICE VISIT (OUTPATIENT)
Dept: GASTROENTEROLOGY | Facility: AMBULARY SURGERY CENTER | Age: 71
End: 2021-03-08
Payer: MEDICARE

## 2021-03-08 VITALS — WEIGHT: 172 LBS | HEIGHT: 62 IN | BODY MASS INDEX: 31.65 KG/M2 | RESPIRATION RATE: 16 BRPM

## 2021-03-08 DIAGNOSIS — Z98.890 HISTORY OF REPAIR OF HIATAL HERNIA: ICD-10-CM

## 2021-03-08 DIAGNOSIS — K31.84 GASTROPARESIS: ICD-10-CM

## 2021-03-08 DIAGNOSIS — Z87.19 HISTORY OF REPAIR OF HIATAL HERNIA: ICD-10-CM

## 2021-03-08 DIAGNOSIS — K21.9 GASTROESOPHAGEAL REFLUX DISEASE WITHOUT ESOPHAGITIS: Primary | ICD-10-CM

## 2021-03-08 PROCEDURE — 99214 OFFICE O/P EST MOD 30 MIN: CPT | Performed by: INTERNAL MEDICINE

## 2021-03-08 RX ORDER — DULOXETIN HYDROCHLORIDE 30 MG/1
30 CAPSULE, DELAYED RELEASE ORAL DAILY
COMMUNITY
Start: 2021-03-02 | End: 2022-04-05 | Stop reason: ALTCHOICE

## 2021-03-08 RX ORDER — LEVOTHYROXINE SODIUM 137 UG/1
137 TABLET ORAL
COMMUNITY
Start: 2021-03-02 | End: 2022-04-01 | Stop reason: HOSPADM

## 2021-03-08 NOTE — LETTER
March 8, 2021     Referral [address] [place] [id number]    Patient: Hilton Gonzalez   YOB: 1950   Date of Visit: 3/8/2021       Dear Dr Hever Thomas:    Thank you for referring Hilton Gonzalez to me for evaluation  Below are my notes for this consultation  If you have questions, please do not hesitate to call me  I look forward to following your patient along with you  Sincerely,        Jessica Menendez MD        CC: MD Jessica Dalton MD  3/8/2021  5:24 PM  Sign when Signing Visit  [facility]  Progress Note - Hilton Gonzalez 69 y o  female MRN: [id number]    Unit/Bed#:  Encounter: [id number]    Assessment/Plan:    1  Moderate-size hiatal hernia status post fundoplication many years ago followed by repeat hiatal hernia repair 5 years ago in [place]- now with recurrent moderate-sized hernia  She is taking pantoprazole 20 mg before dinner and Pepcid 40 mg in the morning with control of acid reflux  No evidence of Ahumada's esophagus on previous EGD   -  Continue to follow anti-reflux measures  Avoid eating late at night  -  Avoid NSAIDs  2  Severe gastroparesis- with only 3% of emptying at 4 hours  Patient reports ongoing symptoms of fullness, bloating and gas  She reports that with small, frequent meals, she is having some relief but not fully  We  Discussed side effect profile associated with Reglan however patient is not interested at this time  We discussed the possible side effect of tardive dyskinesia  We also discussed alternatives of EGD with Botox injection, patient is open to this and would like to proceed with this 1st    Would recommend Zofran on as-needed basis for breakthrough symptoms of nausea however patient reports that her symptoms are mostly of fullness and gas  And would like to hold off on Zofran  -  She is agreeable to eating small meals, low in fat and low in residue   -  Will schedule EGD with Botox    -  Better glycemic control   - would recommend Gas-X for increased gas  3   Constipation- symptoms improved with MiraLax and senna every other day  Reports that she underwent colonoscopy in [place] 4-5 years ago  She does not think that she is due for 1 at this point  Will need to obtain the records of prior colonoscopy  -  Recommend daily probiotic  Subjective: This is a 74-year-old female with history of  Pulmonary hypertension, obstructive sleep apnea on CPAP, diabetes, hypertension, CKD, restless leg syndrome, hyperlipidemia, vitamin-D deficiency, obesity, gastroparesis, GERD, presents for follow-up  Patient was previously seen by Dr Gayathri Garcia in November of 2020  She had undergone EGD in the past which was notable for esophageal diverticulum  she underwent EGD in August of 2020 which was notable for esophageal diverticulum with small amount of food along with retained food in the stomach  She underwent gastric emptying study in October 2020 which was notable for significant delay in the emptying at 4 hours     Patient was only noted to have 3% emptying noted at 4 hours  She underwent CT of chest in  October of 2020 which was notable for  Moderate-size hiatal hernia  With least quarter of the stomach within the chest and interstitial thickening in the lower lobes of her lungs  Patient reports that she has made dietary changes and has been eating smaller meals and low in fat  Patient does not report any symptoms of nausea but does report gas and bloating  She does not have any symptoms of acid reflux, reports that she is taking Pepcid 40 mg in the morning and Protonix 20 mg a in the evenings before dinner  She reports that constipation has improved with MiraLax  Reports having last colonoscopy 4-5 years ago in [place]  Objective:     Vitals: Resp  rate 16, height 5' 2" (1 575 m), weight 78 kg (172 lb)  ,Body mass index is 31 46 kg/m²      [unfilled]    Physical Exam:    GEN: wn/wd, NAD  HEENT: MMM, no cervical or supraclavicular LAD, anciteric  CV: RRR, no m/r/g  CHEST: CTA b/l, no w/r/r  ABD: +BS, soft, NT/ND, no hepatosplenomegaly  EXT: no c/c/e  SKIN: no rashes  NEURO: aaox3      Invasive Devices     None                         Lab, Imaging and other studies:     No visits with results within 1 Day(s) from this visit  Latest known visit with results is:   Orders Only on 02/04/2021   Component Date Value    SARS-CoV-2 02/04/2021 Negative          I have personally reviewed pertinent films in PACS    No current facility-administered medications for this visit  Answers for HPI/ROS submitted by the patient on 3/5/2021   Abdominal pain  Chronicity: chronic  Onset: more than 1 year ago  Onset quality: gradual  Frequency: every several days  Progression since onset: waxing and waning  Pain location: LUQ  Pain - numeric: 6/10  Pain quality: burning, dull  Radiates to: LUQ, left shoulder, chest  anorexia: No  arthralgias: Yes  belching: Yes  constipation: Yes  diarrhea: No  dysuria: No  fever: No  flatus: Yes  frequency: No  headaches: No  hematochezia: No  hematuria: No  melena: No  myalgias: Yes  nausea:  No  weight loss: No  vomiting: No  Aggravated by: eating  Relieved by: belching, bowel movements, palpation, passing flatus

## 2021-03-08 NOTE — PROGRESS NOTES
[facility]  Progress Note Patrizia Dunn 69 y o  female MRN: [id number]    Unit/Bed#:  Encounter: [id number]    Assessment/Plan:    1  Moderate-size hiatal hernia status post fundoplication many years ago followed by repeat hiatal hernia repair 5 years ago in [place]- now with recurrent moderate-sized hernia  She is taking pantoprazole 20 mg before dinner and Pepcid 40 mg in the morning with control of acid reflux  No evidence of Ahumada's esophagus on previous EGD   -  Continue to follow anti-reflux measures  Avoid eating late at night  -  Avoid NSAIDs  2  Severe gastroparesis- with only 3% of emptying at 4 hours  Patient reports ongoing symptoms of fullness, bloating and gas  She reports that with small, frequent meals, she is having some relief but not fully  We  Discussed side effect profile associated with Reglan however patient is not interested at this time  We discussed the possible side effect of tardive dyskinesia  We also discussed alternatives of EGD with Botox injection, patient is open to this and would like to proceed with this 1st    Would recommend Zofran on as-needed basis for breakthrough symptoms of nausea however patient reports that her symptoms are mostly of fullness and gas  And would like to hold off on Zofran  -  She is agreeable to eating small meals, low in fat and low in residue   -  Will schedule EGD with Botox  -  Better glycemic control   - would recommend Gas-X for increased gas  3   Constipation- symptoms improved with MiraLax and senna every other day  Reports that she underwent colonoscopy in [place] 4-5 years ago  She does not think that she is due for 1 at this point  Will need to obtain the records of prior colonoscopy  -  Recommend daily probiotic  Subjective:      This is a 69-year-old female with history of  Pulmonary hypertension, obstructive sleep apnea on CPAP, diabetes, hypertension, CKD, restless leg syndrome, hyperlipidemia, vitamin-D deficiency, obesity, gastroparesis, GERD, presents for follow-up  Patient was previously seen by Dr Junior Edmond in November of 2020  She had undergone EGD in the past which was notable for esophageal diverticulum  she underwent EGD in August of 2020 which was notable for esophageal diverticulum with small amount of food along with retained food in the stomach  She underwent gastric emptying study in October 2020 which was notable for significant delay in the emptying at 4 hours     Patient was only noted to have 3% emptying noted at 4 hours  She underwent CT of chest in  October of 2020 which was notable for  Moderate-size hiatal hernia  With least quarter of the stomach within the chest and interstitial thickening in the lower lobes of her lungs  Patient reports that she has made dietary changes and has been eating smaller meals and low in fat  Patient does not report any symptoms of nausea but does report gas and bloating  She does not have any symptoms of acid reflux, reports that she is taking Pepcid 40 mg in the morning and Protonix 20 mg a in the evenings before dinner  She reports that constipation has improved with MiraLax  Reports having last colonoscopy 4-5 years ago in [place]  Objective:     Vitals: Resp  rate 16, height 5' 2" (1 575 m), weight 78 kg (172 lb)  ,Body mass index is 31 46 kg/m²  [unfilled]    Physical Exam:    GEN: wn/wd, NAD  HEENT: MMM, no cervical or supraclavicular LAD, anciteric  CV: RRR, no m/r/g  CHEST: CTA b/l, no w/r/r  ABD: +BS, soft, NT/ND, no hepatosplenomegaly  EXT: no c/c/e  SKIN: no rashes  NEURO: aaox3      Invasive Devices     None                         Lab, Imaging and other studies:     No visits with results within 1 Day(s) from this visit     Latest known visit with results is:   Orders Only on 02/04/2021   Component Date Value    SARS-CoV-2 02/04/2021 Negative          I have personally reviewed pertinent films in PACS    No current facility-administered medications for this visit  Answers for HPI/ROS submitted by the patient on 3/5/2021   Abdominal pain  Chronicity: chronic  Onset: more than 1 year ago  Onset quality: gradual  Frequency: every several days  Progression since onset: waxing and waning  Pain location: LUQ  Pain - numeric: 6/10  Pain quality: burning, dull  Radiates to: LUQ, left shoulder, chest  anorexia: No  arthralgias: Yes  belching: Yes  constipation: Yes  diarrhea: No  dysuria: No  fever: No  flatus: Yes  frequency: No  headaches: No  hematochezia: No  hematuria: No  melena: No  myalgias: Yes  nausea:  No  weight loss: No  vomiting: No  Aggravated by: eating  Relieved by: belching, bowel movements, palpation, passing flatus

## 2021-03-16 ENCOUNTER — TELEPHONE (OUTPATIENT)
Dept: SLEEP CENTER | Facility: CLINIC | Age: 71
End: 2021-03-16

## 2021-03-16 NOTE — TELEPHONE ENCOUNTER
----- Message from Gerardo Rivera MD sent at 3/11/2021  8:59 PM EST -----  Approve    ----- Message -----  From: Rupali Callahan  Sent: 7/72/0890  10:38 AM EST  To: Sleep Medicine [facility] Provider    This sleep study needs approval      If approved please sign and return to clerical pool  If denied please include reasons why  Also provide alternative testing if warranted  Please sign and return to clerical pool

## 2021-03-29 ENCOUNTER — HOSPITAL ENCOUNTER (OUTPATIENT)
Dept: SLEEP CENTER | Facility: CLINIC | Age: 71
Discharge: HOME | End: 2021-03-29
Payer: MEDICARE

## 2021-03-29 DIAGNOSIS — G47.33 OSA ON CPAP: ICD-10-CM

## 2021-03-29 DIAGNOSIS — N18.30 CHRONIC KIDNEY DISEASE, STAGE III (MODERATE) (HCC): ICD-10-CM

## 2021-03-29 DIAGNOSIS — Z99.89 OSA ON CPAP: ICD-10-CM

## 2021-03-29 DIAGNOSIS — I12.9 HYPERTENSIVE CHRONIC KIDNEY DISEASE WITH STAGE 1 THROUGH STAGE 4 CHRONIC KIDNEY DISEASE, OR UNSPECIFIED CHRONIC KIDNEY DISEASE: ICD-10-CM

## 2021-03-29 DIAGNOSIS — I10 ESSENTIAL HYPERTENSION: ICD-10-CM

## 2021-03-29 PROCEDURE — 95810 POLYSOM 6/> YRS 4/> PARAM: CPT

## 2021-03-29 RX ORDER — AMLODIPINE BESYLATE 5 MG/1
TABLET ORAL
Qty: 90 TABLET | Refills: 3 | Status: SHIPPED | OUTPATIENT
Start: 2021-03-29 | End: 2022-03-23 | Stop reason: SDUPTHER

## 2021-03-30 PROBLEM — Z79.4 TYPE 2 DIABETES MELLITUS WITH DIABETIC NEPHROPATHY, WITH LONG-TERM CURRENT USE OF INSULIN (HCC): Status: ACTIVE | Noted: 2019-06-27

## 2021-03-30 NOTE — PROGRESS NOTES
Sleep Study Documentation    Pre-Sleep Study       Sleep testing procedure explained to patient:YES    Patient napped prior to study:YES- more than 30 minutes  Napped after 2PM: no    Caffeine:Dayshift worker after 12PM   Caffeine use:YES- tea  6 ounces and chocolate  6 ounces    Alcohol:Dayshift workers after 5PM: Alcohol use:NO    Typical day for patient:YES       Study Documentation    Sleep Study Indications: Snoring, Nocturnal choking, Excessive Daytime Sleepiness, Witnessed apneas, HTN    Sleep Study: Diagnostic   Snore: Moderate to loud  Supplemental O2: no    O2 flow rate (L/min) range   O2 flow rate (L/min) final   Minimum SaO2 70%  Baseline SaO2 93%    EKG abnormalities: no     EEG abnormalities: no    Sleep Study Recorded < 2 hours: N/A    Sleep Study Recorded > 2 hours but incomplete study: N/A    Sleep Study Recorded 6 hours but no sleep obtained: NO    Patient classification: retired       Post-Sleep Study    Medication used at bedtime or during sleep study:YES other prescription medications    Patient reports time it took to fall asleep:less than 20 minutes    Patient reports waking up during study:1 to 2 times  Patient reports returning to sleep without difficulty  Patient reports sleeping 6 to 8 hours without dreaming  Patient reports sleep during study:better than usual    Patient rated sleepiness: Somewhat sleepy or tired    PAP treatment:no

## 2021-04-01 ENCOUNTER — OFFICE VISIT (OUTPATIENT)
Dept: FAMILY MEDICINE CLINIC | Facility: CLINIC | Age: 71
End: 2021-04-01
Payer: MEDICARE

## 2021-04-01 VITALS
TEMPERATURE: 97.3 F | HEART RATE: 68 BPM | HEIGHT: 63 IN | WEIGHT: 172 LBS | DIASTOLIC BLOOD PRESSURE: 62 MMHG | BODY MASS INDEX: 30.48 KG/M2 | SYSTOLIC BLOOD PRESSURE: 122 MMHG | OXYGEN SATURATION: 98 % | RESPIRATION RATE: 20 BRPM

## 2021-04-01 DIAGNOSIS — N18.32 STAGE 3B CHRONIC KIDNEY DISEASE (HCC): ICD-10-CM

## 2021-04-01 DIAGNOSIS — Z00.00 MEDICARE ANNUAL WELLNESS VISIT, SUBSEQUENT: Primary | ICD-10-CM

## 2021-04-01 DIAGNOSIS — K21.9 GERD (GASTROESOPHAGEAL REFLUX DISEASE): ICD-10-CM

## 2021-04-01 DIAGNOSIS — Z78.0 POST-MENOPAUSAL: ICD-10-CM

## 2021-04-01 DIAGNOSIS — F33.9 DEPRESSION, RECURRENT (HCC): ICD-10-CM

## 2021-04-01 DIAGNOSIS — L98.9 SKIN LESIONS: ICD-10-CM

## 2021-04-01 DIAGNOSIS — Z23 ENCOUNTER FOR IMMUNIZATION: ICD-10-CM

## 2021-04-01 PROBLEM — J96.11 CHRONIC RESPIRATORY FAILURE WITH HYPOXIA (HCC): Status: ACTIVE | Noted: 2021-04-01

## 2021-04-01 PROBLEM — G63 B12 NEUROPATHY (HCC): Status: ACTIVE | Noted: 2021-04-01

## 2021-04-01 PROBLEM — E53.8 B12 NEUROPATHY (HCC): Status: ACTIVE | Noted: 2021-04-01

## 2021-04-01 PROBLEM — E66.01 MORBID (SEVERE) OBESITY DUE TO EXCESS CALORIES (HCC): Status: ACTIVE | Noted: 2020-05-04

## 2021-04-01 PROCEDURE — 90732 PPSV23 VACC 2 YRS+ SUBQ/IM: CPT

## 2021-04-01 PROCEDURE — G0009 ADMIN PNEUMOCOCCAL VACCINE: HCPCS

## 2021-04-01 PROCEDURE — G0438 PPPS, INITIAL VISIT: HCPCS | Performed by: FAMILY MEDICINE

## 2021-04-01 PROCEDURE — 1123F ACP DISCUSS/DSCN MKR DOCD: CPT | Performed by: FAMILY MEDICINE

## 2021-04-01 NOTE — PATIENT INSTRUCTIONS
Medicare Preventive Visit Patient Instructions  Thank you for completing your Welcome to Medicare Visit or Medicare Annual Wellness Visit today  Your next wellness visit will be due in one year (4/2/2022)  The screening/preventive services that you may require over the next 5-10 years are detailed below  Some tests may not apply to you based off risk factors and/or age  Screening tests ordered at today's visit but not completed yet may show as past due  Also, please note that scanned in results may not display below  Preventive Screenings:  Service Recommendations Previous Testing/Comments   Colorectal Cancer Screening  * Colonoscopy    * Fecal Occult Blood Test (FOBT)/Fecal Immunochemical Test (FIT)  * Fecal DNA/Cologuard Test  * Flexible Sigmoidoscopy Age: 54-65 years old   Colonoscopy: every 10 years (may be performed more frequently if at higher risk)  OR  FOBT/FIT: every 1 year  OR  Cologuard: every 3 years  OR  Sigmoidoscopy: every 5 years  Screening may be recommended earlier than age 48 if at higher risk for colorectal cancer  Also, an individualized decision between you and your healthcare provider will decide whether screening between the ages of 74-80 would be appropriate  Colonoscopy: Not on file  FOBT/FIT: Not on file  Cologuard: Not on file  Sigmoidoscopy: Not on file          Breast Cancer Screening Age: 36 years old  Frequency: every 1-2 years  Not required if history of left and right mastectomy Mammogram: Not on file        Cervical Cancer Screening Between the ages of 21-29, pap smear recommended once every 3 years  Between the ages of 33-67, can perform pap smear with HPV co-testing every 5 years     Recommendations may differ for women with a history of total hysterectomy, cervical cancer, or abnormal pap smears in past  Pap Smear: Not on file        Hepatitis C Screening Once for adults born between [facility]  More frequently in patients at high risk for Hepatitis C Hep C Antibody: 08/19/2020        Diabetes Screening 1-2 times per year if you're at risk for diabetes or have pre-diabetes Fasting glucose: No results in last 5 years   A1C: 7 3 %        Cholesterol Screening Once every 5 years if you don't have a lipid disorder  May order more often based on risk factors  Lipid panel: 01/04/2021          Other Preventive Screenings Covered by Medicare:  1  Abdominal Aortic Aneurysm (AAA) Screening: covered once if your at risk  You're considered to be at risk if you have a family history of AAA  2  Lung Cancer Screening: covers low dose CT scan once per year if you meet all of the following conditions: (1) Age 50-69; (2) No signs or symptoms of lung cancer; (3) Current smoker or have quit smoking within the last 15 years; (4) You have a tobacco smoking history of at least 30 pack years (packs per day multiplied by number of years you smoked); (5) You get a written order from a healthcare provider  3  Glaucoma Screening: covered annually if you're considered high risk: (1) You have diabetes OR (2) Family history of glaucoma OR (3)  aged 48 and older OR (3)  American aged 72 and older  3  Osteoporosis Screening: covered every 2 years if you meet one of the following conditions: (1) You're estrogen deficient and at risk for osteoporosis based off medical history and other findings; (2) Have a vertebral abnormality; (3) On glucocorticoid therapy for more than 3 months; (4) Have primary hyperparathyroidism; (5) On osteoporosis medications and need to assess response to drug therapy  · Last bone density test (DXA Scan): Not on file  5  HIV Screening: covered annually if you're between the age of 12-76  Also covered annually if you are younger than 13 and older than 72 with risk factors for HIV infection  For pregnant patients, it is covered up to 3 times per pregnancy      Immunizations:  Immunization Recommendations   Influenza Vaccine Annual influenza vaccination during flu season is recommended for all persons aged >= 6 months who do not have contraindications   Pneumococcal Vaccine (Prevnar and Pneumovax)  * Prevnar = PCV13  * Pneumovax = PPSV23   Adults 25-60 years old: 1-3 doses may be recommended based on certain risk factors  Adults 72 years old: Prevnar (PCV13) vaccine recommended followed by Pneumovax (PPSV23) vaccine  If already received PPSV23 since turning 65, then PCV13 recommended at least one year after PPSV23 dose  Hepatitis B Vaccine 3 dose series if at intermediate or high risk (ex: diabetes, end stage renal disease, liver disease)   Tetanus (Td) Vaccine - COST NOT COVERED BY MEDICARE PART B Following completion of primary series, a booster dose should be given every 10 years to maintain immunity against tetanus  Td may also be given as tetanus wound prophylaxis  Tdap Vaccine - COST NOT COVERED BY MEDICARE PART B Recommended at least once for all adults  For pregnant patients, recommended with each pregnancy  Shingles Vaccine (Shingrix) - COST NOT COVERED BY MEDICARE PART B  2 shot series recommended in those aged 48 and above     Health Maintenance Due:      Topic Date Due    Colorectal Cancer Screening  04/01/2022 (Originally 4/22/1999)    Hepatitis C Screening  Completed     Immunizations Due:      Topic Date Due    Pneumococcal Vaccine: 65+ Years (1 of 1 - PPSV23) Never done     Advance Directives   What are advance directives? Advance directives are legal documents that state your wishes and plans for medical care  These plans are made ahead of time in case you lose your ability to make decisions for yourself  Advance directives can apply to any medical decision, such as the treatments you want, and if you want to donate organs  What are the types of advance directives? There are many types of advance directives, and each state has rules about how to use them  You may choose a combination of any of the following:  · Living will:   This is a written record of the treatment you want  You can also choose which treatments you do not want, which to limit, and which to stop at a certain time  This includes surgery, medicine, IV fluid, and tube feedings  · Durable power of  for healthcare [facility]): This is a written record that states who you want to make healthcare choices for you when you are unable to make them for yourself  This person, called a proxy, is usually a family member or a friend  You may choose more than 1 proxy  · Do not resuscitate (DNR) order:  A DNR order is used in case your heart stops beating or you stop breathing  It is a request not to have certain forms of treatment, such as CPR  A DNR order may be included in other types of advance directives  · Medical directive: This covers the care that you want if you are in a coma, near death, or unable to make decisions for yourself  You can list the treatments you want for each condition  Treatment may include pain medicine, surgery, blood transfusions, dialysis, IV or tube feedings, and a ventilator (breathing machine)  · Values history: This document has questions about your views, beliefs, and how you feel and think about life  This information can help others choose the care that you would choose  Why are advance directives important? An advance directive helps you control your care  Although spoken wishes may be used, it is better to have your wishes written down  Spoken wishes can be misunderstood, or not followed  Treatments may be given even if you do not want them  An advance directive may make it easier for your family to make difficult choices about your care  Weight Management   Why it is important to manage your weight:  Being overweight increases your risk of health conditions such as heart disease, high blood pressure, type 2 diabetes, and certain types of cancer  It can also increase your risk for osteoarthritis, sleep apnea, and other respiratory problems  Aim for a slow, steady weight loss  Even a small amount of weight loss can lower your risk of health problems  How to lose weight safely:  A safe and healthy way to lose weight is to eat fewer calories and get regular exercise  You can lose up about 1 pound a week by decreasing the number of calories you eat by 500 calories each day  Healthy meal plan for weight management:  A healthy meal plan includes a variety of foods, contains fewer calories, and helps you stay healthy  A healthy meal plan includes the following:  · Eat whole-grain foods more often  A healthy meal plan should contain fiber  Fiber is the part of grains, fruits, and vegetables that is not broken down by your body  Whole-grain foods are healthy and provide extra fiber in your diet  Some examples of whole-grain foods are whole-wheat breads and pastas, oatmeal, brown rice, and bulgur  · Eat a variety of vegetables every day  Include dark, leafy greens such as spinach, kale, jasper greens, and mustard greens  Eat yellow and orange vegetables such as carrots, sweet potatoes, and winter squash  · Eat a variety of fruits every day  Choose fresh or canned fruit (canned in its own juice or light syrup) instead of juice  Fruit juice has very little or no fiber  · Eat low-fat dairy foods  Drink fat-free (skim) milk or 1% milk  Eat fat-free yogurt and low-fat cottage cheese  Try low-fat cheeses such as mozzarella and other reduced-fat cheeses  · Choose meat and other protein foods that are low in fat  Choose beans or other legumes such as split peas or lentils  Choose fish, skinless poultry (chicken or turkey), or lean cuts of red meat (beef or pork)  Before you cook meat or poultry, cut off any visible fat  · Use less fat and oil  Try baking foods instead of frying them  Add less fat, such as margarine, sour cream, regular salad dressing and mayonnaise to foods  Eat fewer high-fat foods   Some examples of high-fat foods include french fries, doughnuts, ice cream, and cakes  · Eat fewer sweets  Limit foods and drinks that are high in sugar  This includes candy, cookies, regular soda, and sweetened drinks  Exercise:  Exercise at least 30 minutes per day on most days of the week  Some examples of exercise include walking, biking, dancing, and swimming  You can also fit in more physical activity by taking the stairs instead of the elevator or parking farther away from stores  Ask your healthcare provider about the best exercise plan for you  © Copyright [employer] 2018 Information is for End User's use only and may not be sold, redistributed or otherwise used for commercial purposes   All illustrations and images included in CareNotes® are the copyrighted property of [employer]  or [address] [place], [place] [address]

## 2021-04-01 NOTE — PROGRESS NOTES
Assessment and Plan:     Problem List Items Addressed This Visit        Digestive    GERD (gastroesophageal reflux disease)    Relevant Medications    pantoprazole (PROTONIX) 20 mg tablet          Genitourinary    CKD (chronic kidney disease) stage 3, GFR 30-59 ml/min       Other    Depression, recurrent ([address] )      Other Visit Diagnoses     Medicare annual wellness visit, subsequent    -  Primary    Encounter for immunization        Relevant Orders    PNEUMOCOCCAL POLYSACCHARIDE VACCINE 23-VALENT =>1YO SQ IM (Completed)    Skin lesions        Relevant Orders    Ambulatory referral to Dermatology- dry scaling lesions on arms that are not resolving  Post-menopausal        check dexa    Relevant Orders    DXA bone density spine hip and pelvis        BMI Counseling: Body mass index is 30 47 kg/m²  The BMI is above normal  Nutrition recommendations include moderation in carbohydrate intake  Preventive health issues were discussed with patient, and age appropriate screening tests were ordered as noted in patient's After Visit Summary  Personalized health advice and appropriate referrals for health education or preventive services given if needed, as noted in patient's After Visit Summary  History of Present Illness:     Patient presents for Welcome to Medicare visit  Following with nephro- cr has been stable  Feels tired during the day  She has BARBARA, she is having trouble with her mask  She sees pulm, just had sleep study    Will follow up  Following with endo for DM          Patient Care Team:  Dirk Will MD as PCP - General (Family Medicine)  Kaley Simental MD (Urology)  Lloyd Brooks MD (Cardiology)  Sean Cabot, DO as Consulting Physician (Endocrinology)     Review of Systems:     Review of Systems   Problem List:     Patient Active Problem List   Diagnosis    Hypertensive chronic kidney disease with stage 1 through stage 4 chronic kidney disease, or unspecified chronic kidney disease    BARBARA on CPAP    RLS (restless legs syndrome)    Persistent proteinuria    Type 2 diabetes mellitus with diabetic nephropathy, with long-term current use of insulin (HCC)    Pulmonary hypertension (HCC)    Dyspnea    History of repair of hiatal hernia    Morbid (severe) obesity due to excess calories (HCC)    CKD (chronic kidney disease) stage 3, GFR 30-59 ml/min    Dyslipidemia    Vitamin D deficiency    Anemia of chronic renal failure, stage 3 (moderate)    Fibromyalgia    Hallucinations    Type 2 diabetes mellitus with diabetic chronic kidney disease (HCC)    GERD (gastroesophageal reflux disease)    Altered mental status    Benign hypertension with CKD (chronic kidney disease) stage III    History of kidney stones    Urinary urgency    Essential hypertension    Ambulatory dysfunction    Memory changes    Gastroparesis    B12 neuropathy (HCC)    Depression, recurrent (HCC)    Chronic respiratory failure with hypoxia (HCC)      Past Medical and Surgical History:     Past Medical History:   Diagnosis Date    Allergic     Anxiety     Arthritis     Aspiration into airway     Chronic kidney disease 2000, 2018    Stones, kidney disease stage 3    Diabetes mellitus (HCC)     Disease of thyroid gland     Family history of thyroid problem     GERD (gastroesophageal reflux disease)     Heart burn     Hyperplasia, parathyroid ([address] )     Hypertension     Kidney problem     Obesity (BMI 30 0-34  9)     Pneumonia     RLS (restless legs syndrome)     Seasonal allergies     Sleep apnea     uses CPAP    Swollen ankles      Past Surgical History:   Procedure Laterality Date    ARTHROSCOPY KNEE      BREAST BIOPSY      CHOLECYSTECTOMY      HIATAL HERNIA REPAIR      LIPOSUCTION      REDUCTION MAMMAPLASTY      SQUAMOUS CELL CARCINOMA EXCISION      TOE SURGERY      TONSILLECTOMY      TUBAL LIGATION        Family History:     Family History   Problem Relation Age of Onset  Heart disease Mother     Depression Mother     Hypertension Mother     COPD Mother     Hearing loss Mother     Heart disease Father     Lung cancer Father 79        Smoker     Cancer Father         brain    Alcohol abuse Father     Dementia Father     Hypertension Father     Thyroid disease Father     COPD Father     Arthritis Father     Hypertension Sister     Diabetes Sister     Heart disease Sister     Thyroid disease Sister     Cancer Sister         Lympoma    Hypertension Brother     Diabetes Brother     Cancer Brother         Throat    Dementia Brother     Stroke Brother     Hypertension Brother     No Known Problems Son     No Known Problems Son     Heart disease Brother     Diabetes Brother       Social History:     E-Cigarette/Vaping    E-Cigarette Use Never User      E-Cigarette/Vaping Substances    Nicotine No     THC No     CBD No     Flavoring No     Other No     Unknown No      Social History     Socioeconomic History    Marital status: /Civil Union     Spouse name: None    Number of children: None    Years of education: None    Highest education level: None   Occupational History    Occupation: RETIRED   Social Needs    Financial resource strain: None    Food insecurity     Worry: None     Inability: None    Transportation needs     Medical: None     Non-medical: None   Tobacco Use    Smoking status: Former Smoker     Packs/day: 2 00     Years: 10 00     Pack years: 20 00     Types: Cigarettes     Start date: 1967     Quit date:      Years since quittin 2    Smokeless tobacco: Never Used   Substance and Sexual Activity    Alcohol use:  Yes     Alcohol/week: 0 0 standard drinks     Frequency: Monthly or less     Drinks per session: 1 or 2     Binge frequency: Never     Comment: 1 or 2 a year    Drug use: No    Sexual activity: Not Currently     Partners: Male     Birth control/protection: Abstinence, Post-menopausal, Female Sterilization   Lifestyle    Physical activity     Days per week: None     Minutes per session: None    Stress: None   Relationships    Social connections     Talks on phone: None     Gets together: None     Attends Yazdanism service: None     Active member of club or organization: None     Attends meetings of clubs or organizations: None     Relationship status: None    Intimate partner violence     Fear of current or ex partner: None     Emotionally abused: None     Physically abused: None     Forced sexual activity: None   Other Topics Concern    None   Social History Narrative    None      Medications and Allergies:     Current Outpatient Medications   Medication Sig Dispense Refill    acetaminophen (TYLENOL) 500 mg tablet Take 1,000 mg by mouth as needed       albuterol (Ventolin HFA) 90 mcg/act inhaler Inhale 2 puffs every 6 (six) hours as needed for wheezing or shortness of breath  0    amLODIPine (NORVASC) 5 mg tablet TAKE 1 TABLET BY MOUTH EVERY DAY 90 tablet 3    b complex vitamins tablet Take 1 tablet by mouth daily       B-D ULTRAFINE III SHORT PEN 31G X 8 MM MISC USE AS DIRECTED 4 TIMES PER DAY  3    doxazosin (CARDURA) 2 mg tablet TAKE 1 TABLET (2 MG TOTAL) BY MOUTH DAILY AT BEDTIME 90 tablet 1    DULoxetine (Cymbalta) 30 mg delayed release capsule Take 30 mg by mouth daily      DULoxetine (CYMBALTA) 60 mg delayed release capsule Take 90 mg by mouth daily      fluticasone (FLONASE) 50 mcg/act nasal spray 1-2 sprays daily      gabapentin (NEURONTIN) 300 mg capsule Take 300 mg by mouth 2 (two) times a day       Icosapent Ethyl (Vascepa) 1 g CAPS Take 1 g by mouth 2 (two) times a day      insulin aspart (NovoLOG) 100 units/mL injection Inject under the skin 3 (three) times a day before meals 14 units, 14 units, 18 units        insulin detemir (LEVEMIR) 100 units/mL subcutaneous injection Inject 32 Units under the skin daily at bedtime (Patient taking differently: Inject 40 Units under the skin daily at bedtime )  0    levothyroxine 137 mcg tablet Take 137 mcg by mouth      Magnesium 500 MG CAPS Take 1 capsule by mouth 2 (two) times a day      metoprolol tartrate (LOPRESSOR) 50 mg tablet Take 1 tablet (50 mg total) by mouth every 12 (twelve) hours 180 tablet 1    olmesartan (BENICAR) 40 mg tablet Take 1 tablet (40 mg total) by mouth daily 90 tablet 0    pantoprazole (PROTONIX) 20 mg tablet Take 1 tablet (20 mg total) by mouth daily      pramipexole (MIRAPEX) 0 25 mg tablet Take 1 tablet (0 25 mg total) by mouth daily 90 tablet 3    rosuvastatin (CRESTOR) 5 mg tablet TAKE 1 TABLET BY MOUTH EVERY DAY AT NIGHT      torsemide (DEMADEX) 20 mg tablet Take 1 tablet (20 mg total) by mouth daily 90 tablet 3    Vitamin D, Ergocalciferol, 2000 units CAPS Take 2,000 Units by mouth daily       famotidine (PEPCID) 40 MG tablet Take 1 tablet (40 mg total) by mouth daily Take daily in am 90 tablet 3     No current facility-administered medications for this visit  Allergies   Allergen Reactions    Pollen Extract     Ciprofloxacin Hives and Itching      Immunizations:     Immunization History   Administered Date(s) Administered    INFLUENZA 10/17/2018, 10/01/2019    Influenza Split High Dose Preservative Free IM 10/17/2018, 10/01/2019    Influenza, high dose seasonal 0 7 mL 09/29/2020    Pneumococcal Polysaccharide PPV23 04/01/2021    SARS-CoV-2 / COVID-19 mRNA IM (DIRECTV) 01/29/2021, 02/26/2021    Tdap 12/26/2018      Health Maintenance:         Topic Date Due    Colorectal Cancer Screening  04/01/2022 (Originally 4/22/2000)    Hepatitis C Screening  Completed     There are no preventive care reminders to display for this patient  Medicare Screening Tests and Risk Assessments:     Coco Webber is here for her Subsequent Wellness visit  Health Risk Assessment:   Patient rates overall health as fair  Patient feels that their physical health rating is slightly better   Patient is dissatisfied with their life  Eyesight was rated as slightly worse  Hearing was rated as same  Patient feels that their emotional and mental health rating is slightly worse  Patients states they are sometimes angry  Patient states they are often unusually tired/fatigued  Pain experienced in the last 7 days has been some  Patient's pain rating has been 4/10  Patient states that she has experienced no weight loss or gain in last 6 months  Depression Screening:   PHQ-2 Score: 2      Fall Risk Screening: In the past year, patient has experienced: no history of falling in past year      Urinary Incontinence Screening:   Patient has leaked urine accidently in the last six months  Home Safety:  Patient does not have trouble with stairs inside or outside of their home  Patient has working smoke alarms and has no working carbon monoxide detector  Home safety hazards include: medications that cause fatigue  Nutrition:   Current diet is Diabetic, No Added Salt and Other (please comment)  Try to incorporate renal diet    Medications:   Patient is currently taking over-the-counter supplements  OTC medications include: Magnesium,  vit D3,  tylenol, suplement for hair, skin, and skin  Mature mulipule vit    Patient is able to manage medications  Hard with so msny pills all being filled at different times  f    Activities of Daily Living (ADLs)/Instrumental Activities of Daily Living (IADLs):   Walk and transfer into and out of bed and chair?: Yes  Dress and groom yourself?: Yes    Bathe or shower yourself?: Yes    Feed yourself?  Yes  Do your laundry/housekeeping?: Yes  Manage your money, pay your bills and track your expenses?: Yes  Make your own meals?: Yes    Do your own shopping?: No    ADL comments:  does a lot of shopping and laundry    Durable Medical Equipment Suppliers  [employer]    Previous Hospitalizations:   Any hospitalizations or ED visits within the last 12 months?: Yes    How many hospitalizations have you had in the last year?: 1-2    Advance Care Planning:   Living will: Yes    Durable POA for healthcare: Yes    Advanced directive: Yes    Advanced directive counseling given: Yes    End of Life Decisions reviewed with patient: Yes      Comments: Advised to bring in copy of living will for chart  Cognitive Screening:   Provider or family/friend/caregiver concerned regarding cognition?: Yes    PREVENTIVE SCREENINGS      Cardiovascular Screening:    General: Screening Current      Diabetes Screening:     General: Screening Not Indicated and History Diabetes      Colorectal Cancer Screening:     General: Screening Current      Cervical Cancer Screening:    General: Screening Not Indicated      Osteoporosis Screening:      Due for: DXA Axial      Abdominal Aortic Aneurysm (AAA) Screening:        General: Screening Not Indicated      Lung Cancer Screening:     General: Screening Not Indicated      Hepatitis C Screening:    General: Screening Current    Screening, Brief Intervention, and Referral to Treatment (SBIRT)    Screening  Typical number of drinks in a day: 1  Typical number of drinks in a week: 1  Interpretation: Low risk drinking behavior  AUDIT-C Screenin) How often did you have a drink containing alcohol in the past year? monthly or less  2) How many drinks did you have on a typical day when you were drinking in the past year?  1 to 2  3) How often did you have 6 or more drinks on one occasion in the past year? never    AUDIT-C Score: 1  Interpretation: Score 0-2 (female): Negative screen for alcohol misuse    Single Item Drug Screening:  How often have you used an illegal drug (including marijuana) or a prescription medication for non-medical reasons in the past year? never    Single Item Drug Screen Score: 0  Interpretation: Negative screen for possible drug use disorder    No exam data present     Physical Exam:     /62   Pulse 68   Temp (!) 97 3 °F (36 3 °C)   Resp 20   Ht 5' 3" (1 6 m)   Wt 78 kg (172 lb)   SpO2 98%   BMI 30 47 kg/m²     Physical Exam  Vitals signs and nursing note reviewed  Constitutional:       General: She is not in acute distress  Appearance: She is well-developed  HENT:      Head: Normocephalic and atraumatic  Eyes:      Conjunctiva/sclera: Conjunctivae normal    Neck:      Musculoskeletal: Neck supple  Cardiovascular:      Rate and Rhythm: Normal rate and regular rhythm  Heart sounds: No murmur  Pulmonary:      Effort: Pulmonary effort is normal  No respiratory distress  Breath sounds: Normal breath sounds  Abdominal:      Palpations: Abdomen is soft  Tenderness: There is no abdominal tenderness  Musculoskeletal:      Right lower leg: No edema  Left lower leg: No edema  Skin:     General: Skin is warm and dry  Neurological:      Mental Status: She is alert and oriented to person, place, and time     Psychiatric:         Mood and Affect: Mood normal          Behavior: Behavior normal           Davis Becerra MD

## 2021-04-02 DIAGNOSIS — K21.9 GASTROESOPHAGEAL REFLUX DISEASE, UNSPECIFIED WHETHER ESOPHAGITIS PRESENT: ICD-10-CM

## 2021-04-02 LAB
CREAT ?TM UR-SCNC: 89.2 UMOL/L
EXT PROTEIN URINE: 59.6
PROT/CREAT UR: 0.67 MG/G{CREAT}

## 2021-04-02 RX ORDER — FAMOTIDINE 40 MG/1
40 TABLET, FILM COATED ORAL DAILY
Qty: 90 TABLET | Refills: 3 | Status: SHIPPED | OUTPATIENT
Start: 2021-04-02 | End: 2022-02-23

## 2021-04-02 NOTE — TELEPHONE ENCOUNTER
GI Physician: Dr Jamilah Crenshaw for Medication: pepcid     Dose: 40 mg daily     Quantity: 90 day supply with refills   Pharmacy and Location: [facility] store [id number]

## 2021-04-02 NOTE — TELEPHONE ENCOUNTER
3/8/21 office visit- taking pepcid 40 mg in the morning with control of acid reflux  Script entered

## 2021-04-04 RX ORDER — PANTOPRAZOLE SODIUM 20 MG/1
20 TABLET, DELAYED RELEASE ORAL DAILY
Start: 2021-04-04 | End: 2021-07-16 | Stop reason: SDUPTHER

## 2021-04-05 ENCOUNTER — TELEPHONE (OUTPATIENT)
Dept: SLEEP CENTER | Facility: CLINIC | Age: 71
End: 2021-04-05

## 2021-04-05 NOTE — TELEPHONE ENCOUNTER
----- Message from Ceasar Cho MD sent at 4/3/2021 11:31 AM EDT -----  Needs titration study  Please schedule for SC with me   Thanks

## 2021-04-05 NOTE — TELEPHONE ENCOUNTER
Left message for the patient that sleep study is resulted and she should call Dr Pat Rudolph for follow up per order   Phone number provided

## 2021-04-06 PROBLEM — N18.4 ANEMIA IN STAGE 4 CHRONIC KIDNEY DISEASE (HCC): Status: ACTIVE | Noted: 2021-04-06

## 2021-04-06 PROBLEM — N18.4 STAGE 4 CHRONIC KIDNEY DISEASE (HCC): Status: ACTIVE | Noted: 2021-04-06

## 2021-04-06 PROBLEM — D63.1 ANEMIA IN STAGE 4 CHRONIC KIDNEY DISEASE (HCC): Status: ACTIVE | Noted: 2021-04-06

## 2021-04-06 PROBLEM — D63.1 ANEMIA IN STAGE 4 CHRONIC KIDNEY DISEASE: Status: ACTIVE | Noted: 2021-04-06

## 2021-04-06 PROBLEM — N18.4 ANEMIA IN STAGE 4 CHRONIC KIDNEY DISEASE: Status: ACTIVE | Noted: 2021-04-06

## 2021-04-06 NOTE — PROGRESS NOTES
RENAL FOLLOW UP NOTE: td    ASSESSMENT AND PLAN:  1   CKD stage 4 :  · Etiology:  Diabetic nephropathy/hypertensive nephrosclerosis/arteriolar nephrosclerosis/chronic NSAID use   No evidence of obstructive uropathy, renal artery disease or primary glomerular disease with a bland urinalysis  Of note, CPK was normal at 105 no evidence rhabdomyolysis    · Baseline creatinine:  1 5-2 0  · Current creatinine:   1 74  at baseline  · Urine protein creatinine ratio:  0 67 g at goal!!  · UA demonstrated no significant hematuria but 3+ proteinuria from  · Serologies:  Normal C3/C4; negative rheumatoid factor; negative SPEP:  Negative UPEP;  Light chain ratio 2 09 essentially normal for chronic kidney disease  negative EWELINA; negative hepatitis-C; normal IgA; normal ASO; negative RPR  Recommendations:  · Treat hypertension-please see below  · Treat dyslipidemia-please see below  · Maintain proteinuria less than 1 g or as low as possible  · Avoid nephrotoxic agents such as NSAIDs, patient counseled as such     2   Volume:   · Current status:  Euvolemic  · Torsemide dose:  Continue current dose  3   Hypertension:  Workup:  · saline suppression test for primary aldosterone state was normal  · plasma free metanephrines was negative  · renal artery duplex was negative 02/2019       Current blood pressure averages:   A m :  135/84, no orthostatic changes  P m :   126/79, standing no orthostatic changes  Heart rate:   60 range        · Goal blood pressure:  less than 130/80 given less than 1 g of proteinuria at this time  Recommendations:  · Push nonmedical regimen including weight loss, isotonic exercise and avoidance of salt; patient counseled as such  · Medication changes today:   · Change olmesartan to 40 mg at bedtime to help with the morning readings since the evening readings are quite good  · Recheck blood pressures in about 4 weeks  4   Electrolytes:  all acceptable including a potassium of 4 3  5   Mineral bone disorder:  · Calcium/magnesium/phosphorus:  Magnesium elevated: Avoid all magnesium products, recheck in a couple weeks  · PTH intact:  34 0 acceptable from prior visit  · Vitamin-D:  34 from prior visit  6   Dyslipidemia:  · Goal LDL:  Less than 100  · Current lipid profile:  LDL 47/HDL 42/triglycerides 309  Recommendations:  Per Endocrinology  7   Anemia:   · Current hemoglobin 11 9 essentially normal  8   Other problems:  · Depression  · Diabetes mellitus per primary medical physician  · Hypothyroidism  · BARBARA on CPAP  · Fibromyalgia/osteoarthritis  · Nephrolithiasis  · Basal cell/squamous CA of the skin  · Urinary stress incontinence  · Status post incisional hernia repairs  · Recent hospitalization as outlined below from severe GERD with hypoxemia and shortness of breath from a failed Nissen fundoplication from prior hiatal hernia repair   Patient is due to have further testing with an EGD by GI and then subsequently thoracic surgery consultation for possible repair(however, according to the patient at this juncture she was felt I risk so no surgery is planned at this time)  In addition, she was placed on Protonix 40 b i d  and Pepcid 40 mg hs  · Hospitalization on 07/14/2020 secondary confusion at home   Apparently she had protracted hospital course in [place] following aspiration pneumonia   Ten days in the ICU on a ventilator   No overt infectious process   Low probability for pulmonary embolus despite an elevated D-dimer   Had mild leukocytosis/SIRS criteria subsequently discharged 1 day later when she clinically improved   Symptoms were felt secondary to her protracted ICU course  GI health maintenance:  Last colonoscopy:  According to the patient less than 5 years ago  PATIENT INSTRUCTIONS:    Patient Instructions     1  Medication changes today:   Please change olmesartan to 40 mg at bedtime and nothing in the morning   Please stop magnesium supplements    2   Please go for non fasting  lab work in 2-3 weeks after making the above medication changes    3  Please take 1 week a blood pressure readings  4 weeks after making the above medication changes     AS FOLLOWS  MORNING AND EVENING, SITTING  as follows:  · TAKE THE MORNING READINGS BEFORE ANY MEDICATIONS AND WHEN YOU ARE RELAXED FOR SEVERAL MINUTES  · TAKE THE EVENING READINGS:  BETWEEN 7-10 P M ; PRIOR TO ANY MEDICATIONS; AT LEAST IN OUR  FROM DINNER; AND CERTAINLY AFTER RELAXING FOR A FEW MINUTES  · PLEASE INCLUDE HEART RATE WITH YOUR BLOOD PRESSURE READINGS  · When taking standing readings, keep your arm supported at heart level and not dangling  · Make sure you are sitting with your back supported and feet on the ground and do not cross your legs or feet  · Make sure you have not taken any coffee or caffeine products or exercised or smoke cigarettes at least 30 minutes before taking your blood pressure  Then please mail these readings into the office    4  Follow-up in 4  months   Please bring in 1 week a blood pressure readings morning evening, sitting and standing is outlined above   PLEASE BRING AN YOUR BLOOD PRESSURE MACHINE TO CORRELATE WITH THE OFFICE MACHINE AT THIS NEXT SCHEDULED VISIT   Please go for fasting lab work 1-2 weeks prior to your appointment      5  General instructions:   AVOID SALT BUT NOT ADDING AN READING LABELS TO MAKE SURE THERE IS LOW-SALT IN THE FOOD THAT YOU ARE EATING  o Goal is less than 2 g of sodium intake or less than 5 g of sodium chloride intake per day     Avoid nonsteroidal anti-inflammatory drugs such as Naprosyn, ibuprofen, Aleve, Advil, Celebrex, Meloxicam (Mobic) etc   You can use Tylenol as needed if you do not have any liver condition to be concerned about     Avoid medications such as Sudafed or decongestants and antihistamines that contained the D component which is the decongestant  You can take antihistamines without the decongestant or D component       Try to avoid medications such as pantoprazole or  Protonix/Nexium or Esomeprazole)/Prilosec or omeprazole/Prevacid or lansoprazole/AcipHex or Rabeprazole  If you are able to, use Pepcid as this is safer for your kidneys   Try to exercise at least 30 minutes 3 days a week to begin with with an ultimate goal of 5 days a week for at least 30 minutes     Try to lose 5-10 lb by your next visit     Please do not drink more than 2 glasses of alcohol/wine on a daily basis as this may contribute to your high blood pressure  Subjective: There has been no hospitalizations or acute illnesses since last visit  The patient overall is feeling well  No fevers, chills,  or colds:  She does have a pale  colored phlegm/cough  Good appetite and fair to poor energy:  Unchanged  No hematuria, dysuria, voiding symptoms, unchanged bubbles in the urine  No gastrointestinal symptoms  No chest pain, does get some mild shortness of breath at this time your related to allergies, slightly more edema this week of the legs because she has been sitting a lot and flying otherwise usually pretty good  No headaches, dizziness or lightheadedness  Blood pressure medications:  · Torsemide 20 mg daily  · Olmesartan 20 mg twice a day  · Metoprolol tartrate 50 mg twice a day  · Doxazosin 2 mg at bedtime  · Amlodipine 5 mg daily in the evening    ROS:  See HPI, otherwise review of systems as completely reviewed with the patient are negative    Past Medical History:   Diagnosis Date    Allergic     Anxiety     Arthritis     Aspiration into airway     Chronic kidney disease 2000, 2018    Stones, kidney disease stage 3    Diabetes mellitus ([facility] [address] [facility]     Disease of thyroid gland     Family history of thyroid problem     GERD (gastroesophageal reflux disease)     Heart burn     Hyperplasia, parathyroid ([facility] [address] [facility]     Hypertension     Kidney problem     Obesity (BMI 30 0-34  9)     Pneumonia     RLS (restless legs syndrome)     Seasonal allergies     Sleep apnea     uses CPAP    Swollen ankles      Past Surgical History:   Procedure Laterality Date    ARTHROSCOPY KNEE      BREAST BIOPSY      CHOLECYSTECTOMY      HIATAL HERNIA REPAIR      LIPOSUCTION      REDUCTION MAMMAPLASTY      SQUAMOUS CELL CARCINOMA EXCISION      TOE SURGERY      TONSILLECTOMY      TUBAL LIGATION       Family History   Problem Relation Age of Onset    Heart disease Mother     Depression Mother     Hypertension Mother     COPD Mother     Hearing loss Mother     Heart disease Father     Lung cancer Father 79        Smoker     Cancer Father         brain    Alcohol abuse Father     Dementia Father     Hypertension Father     Thyroid disease Father     COPD Father     Arthritis Father     Hypertension Sister     Diabetes Sister     Heart disease Sister     Thyroid disease Sister     Cancer Sister         Lympoma    Hypertension Brother     Diabetes Brother     Cancer Brother         Throat    Dementia Brother     Stroke Brother     Hypertension Brother     No Known Problems Son     No Known Problems Son     Heart disease Brother     Diabetes Brother       reports that she quit smoking about 45 years ago  Her smoking use included cigarettes  She started smoking about 53 years ago  She has a 20 00 pack-year smoking history  She has never used smokeless tobacco  She reports current alcohol use  She reports that she does not use drugs      I COMPLETELY REVIEWED THE PAST MEDICAL HISTORY/PAST SURGICAL HISTORY/SOCIAL HISTORY/FAMILY HISTORY/AND MEDICATIONS  AND UPDATED ALL    Objective:     Vitals:   BP sitting on right:  134/68 with a heart rate of 60 and regular  BP standing on right:  136/60 with a heart rate of 60 and regular    Weight (last 2 days)     Date/Time   Weight    04/12/21 0959   79 7 (175 6)            Wt Readings from Last 3 Encounters:   04/12/21 79 7 kg (175 lb 9 6 oz)   04/01/21 78 kg (172 lb)   03/08/21 77 6 kg (171 lb)       Body mass index is 31 11 kg/m²       Physical Exam: General:  No acute distress  Skin:  No acute rash  Eyes:  No scleral icterus, noninjected, no discharge from eyes  ENT:  Moist mucous membranes  Neck:  Supple, no jugular venous distention, trachea is midline, no lymphadenopathy and no thyromegaly  Back   No CVAT  Chest:  Clear to auscultation and percussion, good respiratory effort  CVS:  Regular rate and rhythm without a rub, or gallops or murmurs  Abdomen:  Soft and nontender with normal bowel sounds  Extremities:  No cyanosis and no edema, no arthritic changes, normal range of motion  Neuro:  Grossly intact  Psych:  Alert, oriented x3 and appropriate      Medications:    Current Outpatient Medications:     acetaminophen (TYLENOL) 500 mg tablet, Take 1,000 mg by mouth as needed , Disp: , Rfl:     albuterol (Ventolin HFA) 90 mcg/act inhaler, Inhale 2 puffs every 6 (six) hours as needed for wheezing or shortness of breath, Disp: , Rfl: 0    amLODIPine (NORVASC) 5 mg tablet, TAKE 1 TABLET BY MOUTH EVERY DAY, Disp: 90 tablet, Rfl: 3    b complex vitamins tablet, Take 1 tablet by mouth daily , Disp: , Rfl:     B-D ULTRAFINE III SHORT PEN 31G X 8 MM MISC, USE AS DIRECTED 4 TIMES PER DAY, Disp: , Rfl: 3    doxazosin (CARDURA) 2 mg tablet, TAKE 1 TABLET (2 MG TOTAL) BY MOUTH DAILY AT BEDTIME, Disp: 90 tablet, Rfl: 1    DULoxetine (Cymbalta) 30 mg delayed release capsule, Take 30 mg by mouth daily, Disp: , Rfl:     DULoxetine (CYMBALTA) 60 mg delayed release capsule, Take 90 mg by mouth daily, Disp: , Rfl:     famotidine (PEPCID) 40 MG tablet, Take 1 tablet (40 mg total) by mouth daily Take daily in am, Disp: 90 tablet, Rfl: 3    fluticasone (FLONASE) 50 mcg/act nasal spray, 1-2 sprays daily, Disp: , Rfl:     gabapentin (NEURONTIN) 300 mg capsule, Take 300 mg by mouth 2 (two) times a day , Disp: , Rfl:     Icosapent Ethyl (Vascepa) 1 g CAPS, Take 1 g by mouth 2 (two) times a day, Disp: , Rfl:     insulin aspart (NovoLOG) 100 units/mL injection, Inject under the skin 3 (three) times a day before meals 14 units, 14 units, 18 units  , Disp: , Rfl:     insulin detemir (LEVEMIR) 100 units/mL subcutaneous injection, Inject 32 Units under the skin daily at bedtime (Patient taking differently: Inject 40 Units under the skin daily at bedtime ), Disp: , Rfl: 0    levothyroxine 137 mcg tablet, Take 137 mcg by mouth, Disp: , Rfl:     metoprolol tartrate (LOPRESSOR) 50 mg tablet, Take 1 tablet (50 mg total) by mouth every 12 (twelve) hours, Disp: 180 tablet, Rfl: 1    olmesartan (BENICAR) 40 mg tablet, Take 1 tablet (40 mg total) by mouth daily, Disp: 90 tablet, Rfl: 0    pantoprazole (PROTONIX) 20 mg tablet, Take 1 tablet (20 mg total) by mouth daily, Disp: , Rfl:     pramipexole (MIRAPEX) 0 25 mg tablet, Take 1 tablet (0 25 mg total) by mouth daily, Disp: 90 tablet, Rfl: 3    rosuvastatin (CRESTOR) 5 mg tablet, TAKE 1 TABLET BY MOUTH EVERY DAY AT NIGHT, Disp: , Rfl:     torsemide (DEMADEX) 20 mg tablet, Take 1 tablet (20 mg total) by mouth daily, Disp: 90 tablet, Rfl: 3    Vitamin D, Ergocalciferol, 2000 units CAPS, Take 2,000 Units by mouth daily , Disp: , Rfl:     Lab, Imaging and other studies: I have personally reviewed pertinent labs  Laboratory Results:  Results for orders placed or performed in visit on 04/02/21   Protein / creatinine ratio, urine   Result Value Ref Range    PROTEIN UA 59 6     EXT Creatinine Urine 89 2     EXTERNAL Ur Prot/Creat Ratio 0 67              Invalid input(s): ALBUMIN      Radiology review:   chest X-ray    Ultrasound      Portions of the record may have been created with voice recognition software  Occasional wrong word or "sound a like" substitutions may have occurred due to the inherent limitations of voice recognition software  Read the chart carefully and recognize, using context, where substitutions have occurred

## 2021-04-12 ENCOUNTER — OFFICE VISIT (OUTPATIENT)
Dept: NEPHROLOGY | Facility: CLINIC | Age: 71
End: 2021-04-12
Payer: MEDICARE

## 2021-04-12 VITALS — BODY MASS INDEX: 31.11 KG/M2 | WEIGHT: 175.6 LBS | HEIGHT: 63 IN

## 2021-04-12 DIAGNOSIS — E78.5 DYSLIPIDEMIA: ICD-10-CM

## 2021-04-12 DIAGNOSIS — Z79.4 TYPE 2 DIABETES MELLITUS WITH DIABETIC NEPHROPATHY, WITH LONG-TERM CURRENT USE OF INSULIN (HCC): ICD-10-CM

## 2021-04-12 DIAGNOSIS — E11.21 TYPE 2 DIABETES MELLITUS WITH DIABETIC NEPHROPATHY, WITH LONG-TERM CURRENT USE OF INSULIN (HCC): ICD-10-CM

## 2021-04-12 DIAGNOSIS — I12.9 HYPERTENSIVE CHRONIC KIDNEY DISEASE WITH STAGE 1 THROUGH STAGE 4 CHRONIC KIDNEY DISEASE, OR UNSPECIFIED CHRONIC KIDNEY DISEASE: Primary | ICD-10-CM

## 2021-04-12 DIAGNOSIS — N18.4 STAGE 4 CHRONIC KIDNEY DISEASE (HCC): ICD-10-CM

## 2021-04-12 DIAGNOSIS — E55.9 VITAMIN D DEFICIENCY: ICD-10-CM

## 2021-04-12 DIAGNOSIS — N18.4 ANEMIA IN STAGE 4 CHRONIC KIDNEY DISEASE (HCC): ICD-10-CM

## 2021-04-12 DIAGNOSIS — D63.1 ANEMIA IN STAGE 4 CHRONIC KIDNEY DISEASE (HCC): ICD-10-CM

## 2021-04-12 DIAGNOSIS — R80.1 PERSISTENT PROTEINURIA: ICD-10-CM

## 2021-04-12 PROCEDURE — 99214 OFFICE O/P EST MOD 30 MIN: CPT | Performed by: INTERNAL MEDICINE

## 2021-04-12 NOTE — LETTER
April 12, 2021     Naheed Miramontes MD  [address]  [address]  [facility]    Patient: Toño Muñoz   YOB: 1950   Date of Visit: 4/12/2021       Dear Dr Leona Malone: Thank you for referring Toño Muñoz to me for evaluation  Below are my notes for this consultation  If you have questions, please do not hesitate to call me  I look forward to following your patient along with you  Sincerely,        Miguel Moreno MD        CC: Marceline Redo, MD Zane Novas, MD Diep Mortimer Carolus, MD  4/12/2021 10:28 AM  Sign when Signing Visit  RENAL FOLLOW UP NOTE: td    ASSESSMENT AND PLAN:  1   CKD stage 4 :  · Etiology:  Diabetic nephropathy/hypertensive nephrosclerosis/arteriolar nephrosclerosis/chronic NSAID use   No evidence of obstructive uropathy, renal artery disease or primary glomerular disease with a bland urinalysis  Of note, CPK was normal at 105 no evidence rhabdomyolysis    · Baseline creatinine:  1 5-2 0  · Current creatinine:   1 74  at baseline  · Urine protein creatinine ratio:  0 67 g at goal!!  · UA demonstrated no significant hematuria but 3+ proteinuria from  · Serologies:  Normal C3/C4; negative rheumatoid factor; negative SPEP:  Negative UPEP;  Light chain ratio 2 09 essentially normal for chronic kidney disease  negative EWELINA; negative hepatitis-C; normal IgA; normal ASO; negative RPR  Recommendations:  · Treat hypertension-please see below  · Treat dyslipidemia-please see below  · Maintain proteinuria less than 1 g or as low as possible  · Avoid nephrotoxic agents such as NSAIDs, patient counseled as such     2   Volume:   · Current status:  Euvolemic  · Torsemide dose:  Continue current dose  3   Hypertension:  Workup:  · saline suppression test for primary aldosterone state was normal  · plasma free metanephrines was negative  · renal artery duplex was negative 02/2019       Current blood pressure averages:   A m :  135/84, no orthostatic changes  P m : 126/79, standing no orthostatic changes  Heart rate:   60 range        · Goal blood pressure:  less than 130/80 given less than 1 g of proteinuria at this time  Recommendations:  · Push nonmedical regimen including weight loss, isotonic exercise and avoidance of salt; patient counseled as such  · Medication changes today:   · Change olmesartan to 40 mg at bedtime to help with the morning readings since the evening readings are quite good  · Recheck blood pressures in about 4 weeks  4   Electrolytes:  all acceptable including a potassium of 4 3  5   Mineral bone disorder:  · Calcium/magnesium/phosphorus:  Magnesium elevated: Avoid all magnesium products, recheck in a couple weeks  · PTH intact:  34 0 acceptable from prior visit  · Vitamin-D:  34 from prior visit  6   Dyslipidemia:  · Goal LDL:  Less than 100  · Current lipid profile:  LDL 47/HDL 42/triglycerides 309  Recommendations:  Per Endocrinology  7   Anemia:   · Current hemoglobin 11 9 essentially normal  8   Other problems:  · Depression  · Diabetes mellitus per primary medical physician  · Hypothyroidism  · BARBARA on CPAP  · Fibromyalgia/osteoarthritis  · Nephrolithiasis  · Basal cell/squamous CA of the skin  · Urinary stress incontinence  · Status post incisional hernia repairs  · Recent hospitalization as outlined below from severe GERD with hypoxemia and shortness of breath from a failed Nissen fundoplication from prior hiatal hernia repair   Patient is due to have further testing with an EGD by GI and then subsequently thoracic surgery consultation for possible repair(however, according to the patient at this juncture she was felt I risk so no surgery is planned at this time)  In addition, she was placed on Protonix 40 b i d  and Pepcid 40 mg hs  · Hospitalization on 07/14/2020 secondary confusion at home   Apparently she had protracted hospital course in [place] following aspiration pneumonia   Ten days in the ICU on a ventilator   No overt infectious process   Low probability for pulmonary embolus despite an elevated D-dimer   Had mild leukocytosis/SIRS criteria subsequently discharged 1 day later when she clinically improved   Symptoms were felt secondary to her protracted ICU course  GI health maintenance:  Last colonoscopy:  According to the patient less than 5 years ago  PATIENT INSTRUCTIONS:    Patient Instructions     1  Medication changes today:   Please change olmesartan to 40 mg at bedtime and nothing in the morning   Please stop magnesium supplements    2  Please go for non fasting  lab work in 2-3 weeks after making the above medication changes    3  Please take 1 week a blood pressure readings  4 weeks after making the above medication changes     AS FOLLOWS  MORNING AND EVENING, SITTING  as follows:  · TAKE THE MORNING READINGS BEFORE ANY MEDICATIONS AND WHEN YOU ARE RELAXED FOR SEVERAL MINUTES  · TAKE THE EVENING READINGS:  BETWEEN 7-10 P M ; PRIOR TO ANY MEDICATIONS; AT LEAST IN OUR  FROM DINNER; AND CERTAINLY AFTER RELAXING FOR A FEW MINUTES  · PLEASE INCLUDE HEART RATE WITH YOUR BLOOD PRESSURE READINGS  · When taking standing readings, keep your arm supported at heart level and not dangling  · Make sure you are sitting with your back supported and feet on the ground and do not cross your legs or feet  · Make sure you have not taken any coffee or caffeine products or exercised or smoke cigarettes at least 30 minutes before taking your blood pressure  Then please mail these readings into the office    4  Follow-up in 4  months   Please bring in 1 week a blood pressure readings morning evening, sitting and standing is outlined above   PLEASE BRING AN YOUR BLOOD PRESSURE MACHINE TO CORRELATE WITH THE OFFICE MACHINE AT THIS NEXT SCHEDULED VISIT   Please go for fasting lab work 1-2 weeks prior to your appointment      5   General instructions:   AVOID SALT BUT NOT ADDING AN READING LABELS TO MAKE SURE THERE IS LOW-SALT IN THE FOOD THAT YOU ARE EATING  o Goal is less than 2 g of sodium intake or less than 5 g of sodium chloride intake per day     Avoid nonsteroidal anti-inflammatory drugs such as Naprosyn, ibuprofen, Aleve, Advil, Celebrex, Meloxicam (Mobic) etc   You can use Tylenol as needed if you do not have any liver condition to be concerned about     Avoid medications such as Sudafed or decongestants and antihistamines that contained the D component which is the decongestant  You can take antihistamines without the decongestant or D component   Try to avoid medications such as pantoprazole or  Protonix/Nexium or Esomeprazole)/Prilosec or omeprazole/Prevacid or lansoprazole/AcipHex or Rabeprazole  If you are able to, use Pepcid as this is safer for your kidneys   Try to exercise at least 30 minutes 3 days a week to begin with with an ultimate goal of 5 days a week for at least 30 minutes     Try to lose 5-10 lb by your next visit     Please do not drink more than 2 glasses of alcohol/wine on a daily basis as this may contribute to your high blood pressure  Subjective: There has been no hospitalizations or acute illnesses since last visit  The patient overall is feeling well    No fevers, chills,  or colds:  She does have a pale  colored phlegm/cough  Good appetite and fair to poor energy:  Unchanged  No hematuria, dysuria, voiding symptoms, unchanged bubbles in the urine  No gastrointestinal symptoms  No chest pain, does get some mild shortness of breath at this time your related to allergies, slightly more edema this week of the legs because she has been sitting a lot and flying otherwise usually pretty good  No headaches, dizziness or lightheadedness  Blood pressure medications:  · Torsemide 20 mg daily  · Olmesartan 20 mg twice a day  · Metoprolol tartrate 50 mg twice a day  · Doxazosin 2 mg at bedtime  · Amlodipine 5 mg daily in the evening    ROS:  See HPI, otherwise review of systems as completely reviewed with the patient are negative    Past Medical History:   Diagnosis Date    Allergic     Anxiety     Arthritis     Aspiration into airway     Chronic kidney disease 2000, 2018    Stones, kidney disease stage 3    Diabetes mellitus ([facility] [address] )     Disease of thyroid gland     Family history of thyroid problem     GERD (gastroesophageal reflux disease)     Heart burn     Hyperplasia, parathyroid ([facility] [address] )     Hypertension     Kidney problem     Obesity (BMI 30 0-34  9)     Pneumonia     RLS (restless legs syndrome)     Seasonal allergies     Sleep apnea     uses CPAP    Swollen ankles      Past Surgical History:   Procedure Laterality Date    ARTHROSCOPY KNEE      BREAST BIOPSY      CHOLECYSTECTOMY      HIATAL HERNIA REPAIR      LIPOSUCTION      REDUCTION MAMMAPLASTY      SQUAMOUS CELL CARCINOMA EXCISION      TOE SURGERY      TONSILLECTOMY      TUBAL LIGATION       Family History   Problem Relation Age of Onset    Heart disease Mother     Depression Mother     Hypertension Mother     COPD Mother     Hearing loss Mother     Heart disease Father     Lung cancer Father 79        Smoker     Cancer Father         brain    Alcohol abuse Father     Dementia Father     Hypertension Father     Thyroid disease Father     COPD Father     Arthritis Father     Hypertension Sister     Diabetes Sister     Heart disease Sister     Thyroid disease Sister     Cancer Sister         Lympoma    Hypertension Brother     Diabetes Brother     Cancer Brother         Throat    Dementia Brother     Stroke Brother     Hypertension Brother     No Known Problems Son     No Known Problems Son     Heart disease Brother     Diabetes Brother       reports that she quit smoking about 45 years ago  Her smoking use included cigarettes  She started smoking about 53 years ago  She has a 20 00 pack-year smoking history  She has never used smokeless tobacco  She reports current alcohol use  She reports that she does not use drugs  I COMPLETELY REVIEWED THE PAST MEDICAL HISTORY/PAST SURGICAL HISTORY/SOCIAL HISTORY/FAMILY HISTORY/AND MEDICATIONS  AND UPDATED ALL    Objective:     Vitals:   BP sitting on right:  134/68 with a heart rate of 60 and regular  BP standing on right:  136/60 with a heart rate of 60 and regular    Weight (last 2 days)     Date/Time   Weight    04/12/21 0959   79 7 (175 6)            Wt Readings from Last 3 Encounters:   04/12/21 79 7 kg (175 lb 9 6 oz)   04/01/21 78 kg (172 lb)   03/08/21 77 6 kg (171 lb)       Body mass index is 31 11 kg/m²       Physical Exam: General:  No acute distress  Skin:  No acute rash  Eyes:  No scleral icterus, noninjected, no discharge from eyes  ENT:  Moist mucous membranes  Neck:  Supple, no jugular venous distention, trachea is midline, no lymphadenopathy and no thyromegaly  Back   No CVAT  Chest:  Clear to auscultation and percussion, good respiratory effort  CVS:  Regular rate and rhythm without a rub, or gallops or murmurs  Abdomen:  Soft and nontender with normal bowel sounds  Extremities:  No cyanosis and no edema, no arthritic changes, normal range of motion  Neuro:  Grossly intact  Psych:  Alert, oriented x3 and appropriate      Medications:    Current Outpatient Medications:     acetaminophen (TYLENOL) 500 mg tablet, Take 1,000 mg by mouth as needed , Disp: , Rfl:     albuterol (Ventolin HFA) 90 mcg/act inhaler, Inhale 2 puffs every 6 (six) hours as needed for wheezing or shortness of breath, Disp: , Rfl: 0    amLODIPine (NORVASC) 5 mg tablet, TAKE 1 TABLET BY MOUTH EVERY DAY, Disp: 90 tablet, Rfl: 3    b complex vitamins tablet, Take 1 tablet by mouth daily , Disp: , Rfl:     B-D ULTRAFINE III SHORT PEN 31G X 8 MM MISC, USE AS DIRECTED 4 TIMES PER DAY, Disp: , Rfl: 3    doxazosin (CARDURA) 2 mg tablet, TAKE 1 TABLET (2 MG TOTAL) BY MOUTH DAILY AT BEDTIME, Disp: 90 tablet, Rfl: 1    DULoxetine (Cymbalta) 30 mg delayed release capsule, Take 30 mg by mouth daily, Disp: , Rfl:     DULoxetine (CYMBALTA) 60 mg delayed release capsule, Take 90 mg by mouth daily, Disp: , Rfl:     famotidine (PEPCID) 40 MG tablet, Take 1 tablet (40 mg total) by mouth daily Take daily in am, Disp: 90 tablet, Rfl: 3    fluticasone (FLONASE) 50 mcg/act nasal spray, 1-2 sprays daily, Disp: , Rfl:     gabapentin (NEURONTIN) 300 mg capsule, Take 300 mg by mouth 2 (two) times a day , Disp: , Rfl:     Icosapent Ethyl (Vascepa) 1 g CAPS, Take 1 g by mouth 2 (two) times a day, Disp: , Rfl:     insulin aspart (NovoLOG) 100 units/mL injection, Inject under the skin 3 (three) times a day before meals 14 units, 14 units, 18 units  , Disp: , Rfl:     insulin detemir (LEVEMIR) 100 units/mL subcutaneous injection, Inject 32 Units under the skin daily at bedtime (Patient taking differently: Inject 40 Units under the skin daily at bedtime ), Disp: , Rfl: 0    levothyroxine 137 mcg tablet, Take 137 mcg by mouth, Disp: , Rfl:     metoprolol tartrate (LOPRESSOR) 50 mg tablet, Take 1 tablet (50 mg total) by mouth every 12 (twelve) hours, Disp: 180 tablet, Rfl: 1    olmesartan (BENICAR) 40 mg tablet, Take 1 tablet (40 mg total) by mouth daily, Disp: 90 tablet, Rfl: 0    pantoprazole (PROTONIX) 20 mg tablet, Take 1 tablet (20 mg total) by mouth daily, Disp: , Rfl:     pramipexole (MIRAPEX) 0 25 mg tablet, Take 1 tablet (0 25 mg total) by mouth daily, Disp: 90 tablet, Rfl: 3    rosuvastatin (CRESTOR) 5 mg tablet, TAKE 1 TABLET BY MOUTH EVERY DAY AT NIGHT, Disp: , Rfl:     torsemide (DEMADEX) 20 mg tablet, Take 1 tablet (20 mg total) by mouth daily, Disp: 90 tablet, Rfl: 3    Vitamin D, Ergocalciferol, 2000 units CAPS, Take 2,000 Units by mouth daily , Disp: , Rfl:     Lab, Imaging and other studies: I have personally reviewed pertinent labs    Laboratory Results:  Results for orders placed or performed in visit on 04/02/21   Protein / creatinine ratio, urine   Result Value Ref Range    PROTEIN UA 59 6     EXT Creatinine Urine 89 2     EXTERNAL Ur Prot/Creat Ratio 0 67              Invalid input(s): ALBUMIN      Radiology review:   chest X-ray    Ultrasound      Portions of the record may have been created with voice recognition software  Occasional wrong word or "sound a like" substitutions may have occurred due to the inherent limitations of voice recognition software  Read the chart carefully and recognize, using context, where substitutions have occurred

## 2021-04-12 NOTE — PATIENT INSTRUCTIONS
1  Medication changes today:   Please change olmesartan to 40 mg at bedtime and nothing in the morning   Please stop magnesium supplements    2  Please go for non fasting  lab work in 2-3 weeks after making the above medication changes    3  Please take 1 week a blood pressure readings  4 weeks after making the above medication changes     AS FOLLOWS  MORNING AND EVENING, SITTING  as follows:  · TAKE THE MORNING READINGS BEFORE ANY MEDICATIONS AND WHEN YOU ARE RELAXED FOR SEVERAL MINUTES  · TAKE THE EVENING READINGS:  BETWEEN 7-10 P M ; PRIOR TO ANY MEDICATIONS; AT LEAST IN OUR  FROM DINNER; AND CERTAINLY AFTER RELAXING FOR A FEW MINUTES  · PLEASE INCLUDE HEART RATE WITH YOUR BLOOD PRESSURE READINGS  · When taking standing readings, keep your arm supported at heart level and not dangling  · Make sure you are sitting with your back supported and feet on the ground and do not cross your legs or feet  · Make sure you have not taken any coffee or caffeine products or exercised or smoke cigarettes at least 30 minutes before taking your blood pressure  Then please mail these readings into the office    4  Follow-up in 4  months   Please bring in 1 week a blood pressure readings morning evening, sitting and standing is outlined above   PLEASE BRING AN YOUR BLOOD PRESSURE MACHINE TO CORRELATE WITH THE OFFICE MACHINE AT THIS NEXT SCHEDULED VISIT   Please go for fasting lab work 1-2 weeks prior to your appointment      5   General instructions:   AVOID SALT BUT NOT ADDING AN READING LABELS TO MAKE SURE THERE IS LOW-SALT IN THE FOOD THAT YOU ARE EATING  o Goal is less than 2 g of sodium intake or less than 5 g of sodium chloride intake per day     Avoid nonsteroidal anti-inflammatory drugs such as Naprosyn, ibuprofen, Aleve, Advil, Celebrex, Meloxicam (Mobic) etc   You can use Tylenol as needed if you do not have any liver condition to be concerned about     Avoid medications such as Sudafed or decongestants and antihistamines that contained the D component which is the decongestant  You can take antihistamines without the decongestant or D component   Try to avoid medications such as pantoprazole or  Protonix/Nexium or Esomeprazole)/Prilosec or omeprazole/Prevacid or lansoprazole/AcipHex or Rabeprazole  If you are able to, use Pepcid as this is safer for your kidneys   Try to exercise at least 30 minutes 3 days a week to begin with with an ultimate goal of 5 days a week for at least 30 minutes     Try to lose 5-10 lb by your next visit     Please do not drink more than 2 glasses of alcohol/wine on a daily basis as this may contribute to your high blood pressure

## 2021-04-22 ENCOUNTER — OFFICE VISIT (OUTPATIENT)
Dept: NEUROLOGY | Facility: CLINIC | Age: 71
End: 2021-04-22
Payer: MEDICARE

## 2021-04-22 VITALS
BODY MASS INDEX: 30.12 KG/M2 | HEIGHT: 63 IN | DIASTOLIC BLOOD PRESSURE: 60 MMHG | HEART RATE: 59 BPM | SYSTOLIC BLOOD PRESSURE: 120 MMHG | WEIGHT: 170 LBS

## 2021-04-22 DIAGNOSIS — R26.2 AMBULATORY DYSFUNCTION: ICD-10-CM

## 2021-04-22 DIAGNOSIS — G63 B12 NEUROPATHY (HCC): Primary | ICD-10-CM

## 2021-04-22 DIAGNOSIS — F03.90 UNSPECIFIED DEMENTIA WITHOUT BEHAVIORAL DISTURBANCE (HCC): ICD-10-CM

## 2021-04-22 DIAGNOSIS — Z92.3 H/O RADIOACTIVE IODINE THYROID ABLATION: ICD-10-CM

## 2021-04-22 DIAGNOSIS — E53.8 B12 NEUROPATHY (HCC): Primary | ICD-10-CM

## 2021-04-22 DIAGNOSIS — R41.3 MEMORY CHANGES: ICD-10-CM

## 2021-04-22 PROCEDURE — 99215 OFFICE O/P EST HI 40 MIN: CPT | Performed by: PSYCHIATRY & NEUROLOGY

## 2021-04-22 NOTE — ASSESSMENT & PLAN NOTE
Patient initially presented with difficulty with short term memory in the setting of prolong intubation with poor O2 saturations and covid social isolation  On clinical evaluation, no concerns for dementia  MOCA score of 23/30  MRI showed concerns for NPH however on evaluation more consistent with atrophy due to normal aging  Memory has remained stable  Does not get loss in familiar places  No dangerous situations  She has been keeping more mentally engaged  Now that she has had the covid vaccine she has been going out with people in her [employer] and has been helping friends   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         Plan:  · Memory changes appropriate for age   · Memory has been stable if not improved with being more socially engaged   · Advised continuing this while keeping precautions   · Suggest to keep mentally active as much as possible, Simi Diaz is an excellent exercise  · Bloodwork: TSH and Vitamin B12  · Will call with results  · Follow up as needed

## 2021-04-22 NOTE — ASSESSMENT & PLAN NOTE
Patient with difficulty walking associated with loss of balance  Denies dizziness  Initial CT scan shows area in insular region suggestive of stroke  MRI showed concerns for NPH however on evaluation more consistent with atrophy due to normal aging and chronic microangiopathic changes  Physical exam notable for normal casual gait unassisted and buckling of the knees on tandem gait      Plan:   · Advise continuing to work closely with PT for balance therapy

## 2021-04-22 NOTE — PROGRESS NOTES
Patient ID: Cyrus Russell is a 66 y o  female  Assessment/Plan:    Memory changes  Patient initially presented with difficulty with short term memory in the setting of prolong intubation with poor O2 saturations and covid social isolation  On clinical evaluation, no concerns for dementia  MOCA score of 23/30  MRI showed concerns for NPH however on evaluation more consistent with atrophy due to normal aging  Memory has remained stable  Does not get loss in familiar places  No dangerous situations  She has been keeping more mentally engaged  Now that she has had the covid vaccine she has been going out with people in her [employer] and has been helping friends  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         Plan:  · Memory changes appropriate for age   · Memory has been stable if not improved with being more socially engaged   · Advised continuing this while keeping precautions   · Suggest to keep mentally active as much as possible, [place] is an excellent exercise  · Bloodwork: TSH and Vitamin B12  · Will call with results  · Follow up as needed    Ambulatory dysfunction  Patient with difficulty walking associated with loss of balance  Denies dizziness  Initial CT scan shows area in insular region suggestive of stroke  MRI showed concerns for NPH however on evaluation more consistent with atrophy due to normal aging and chronic microangiopathic changes  Physical exam notable for normal casual gait unassisted and buckling of the knees on tandem gait      Plan:   · Advise continuing to work closely with PT for balance therapy        Diagnoses and all orders for this visit:    B12 neuropathy ([address] )  -     Vitamin B12; Future    H/O radioactive iodine thyroid ablation  -     TSH, 3rd generation with Free T4 reflex; Future    Unspecified dementia without behavioral disturbance (HCC)   -     TSH, 3rd generation with Free T4 reflex;  Future    Ambulatory dysfunction    Memory changes       Subjective:    HPI     Cyrus Russell is a very pleasant 23-year-old female with past medical history of type 2 DM, BARBARA, hypertension, neuropathy and multiple comorbidities who presents for follow-up on memory changes and gait difficulties  Patient previously seen by Dr Thiago Marks I  At the time CT scan showed a small left insular hypodensity which raise concerns for stroke  At the time order was placed for MRI to further evaluate this as well as physical therapy to help with her gait and balance  Memory has remained stable  Does not get loss in familiar places  No dangerous situations  She has been keeping more mentally engaged  Now that she has had the covid vaccine she has been going out with people in her [employer] and has been helping friends  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  Patient states that PT has slightly helped with her balance  Her  states that when she gets up from a seating position she takes her time to get her balance however she walks without difficulties  She denies falling recently  Patient had a massage yesterday and due to this had a back spasm that cause pain radiated down her left side  Patient also has a bone spur which causes pain radiating upwards  Patient has been falling asleep during the day more frequently and has been talking in her daytime sleep frequently  She is currently undergoing further investigation for her BARBARA  MRI ordered last visit showed a "mildly prominent ventricular system is nonspecific and could be on the basis of central atrophy  However a communicating hydrocephalus such as normal pressure hydrocephalus should be considered in the setting of ataxia and memory loss "      Patient recently saw Neuroophthalmology who reports her having cataract formation with no other concerns         The following portions of the patient's history were reviewed and updated as appropriate: allergies, current medications, past family history, past medical history, past social history, past surgical history and problem list and ros  Objective:    Blood pressure 120/60, pulse 59, height 5' 3" (1 6 m), weight 77 1 kg (170 lb)  Physical Exam  Constitutional:       General: She is awake  Eyes:      General: Lids are normal       Extraocular Movements: Extraocular movements intact  Neurological:      Mental Status: She is alert  Deep Tendon Reflexes: Strength normal       Reflex Scores:       Tricep reflexes are 2+ on the right side and 2+ on the left side  Bicep reflexes are 2+ on the right side and 2+ on the left side  Brachioradialis reflexes are 2+ on the right side and 2+ on the left side  Patellar reflexes are 2+ on the right side and 2+ on the left side  Achilles reflexes are 0 on the right side and 0 on the left side  Neurological Exam  Mental Status  Awake and alert  Cranial Nerves  CN III, IV, VI: Extraocular movements intact bilaterally  Normal lids and orbits bilaterally  CN VII: Full and symmetric facial movement  CN VIII: Hearing is normal   CN IX, X: Palate elevates symmetrically    Motor   Strength is 5/5 throughout all four extremities  Reflexes                                           Right                      Left  Brachioradialis                    2+                         2+  Biceps                                 2+                         2+  Triceps                                2+                         2+  Patellar                                2+                         2+  Achilles                                0                         0  Brisk patellar reflexes   Coordination  Right: Finger-to-nose normal   Left: Finger-to-nose normal     Gait  Casual gait is normal including stance, stride, and arm swing  Walking with assistance of rolator   Normal casual gait unassisted   On heel to toe has loss of balance and had knee buckling   ROS:    Review of Systems   Constitutional: Negative    Negative for appetite change and fever  HENT: Negative  Negative for hearing loss, tinnitus, trouble swallowing and voice change  Eyes: Negative  Negative for photophobia and pain  Respiratory: Negative  Negative for shortness of breath  Cardiovascular: Negative  Negative for palpitations  Gastrointestinal: Negative  Negative for nausea and vomiting  Endocrine: Negative  Negative for cold intolerance  Genitourinary: Negative  Negative for dysuria, frequency and urgency  Musculoskeletal: Negative  Negative for myalgias and neck pain  Skin: Negative  Negative for rash  Neurological: Negative  Negative for dizziness, tremors, seizures, syncope, facial asymmetry, speech difficulty, weakness, light-headedness, numbness and headaches  Hematological: Negative  Does not bruise/bleed easily  Psychiatric/Behavioral: Negative  Negative for confusion, hallucinations and sleep disturbance

## 2021-04-26 DIAGNOSIS — I10 ESSENTIAL HYPERTENSION: ICD-10-CM

## 2021-04-26 RX ORDER — METOPROLOL TARTRATE 50 MG/1
50 TABLET, FILM COATED ORAL EVERY 12 HOURS SCHEDULED
Qty: 180 TABLET | Refills: 3 | Status: SHIPPED | OUTPATIENT
Start: 2021-04-26 | End: 2022-05-12

## 2021-04-27 ENCOUNTER — TELEPHONE (OUTPATIENT)
Dept: NEPHROLOGY | Facility: CLINIC | Age: 71
End: 2021-04-27

## 2021-04-27 NOTE — TELEPHONE ENCOUNTER
----- Message from Sammy Matthew MD sent at 4/27/2021 10:50 AM EDT -----  Magnesium is now at goal at 2 2   Thank you

## 2021-05-04 ENCOUNTER — CONSULT (OUTPATIENT)
Dept: DERMATOLOGY | Facility: CLINIC | Age: 71
End: 2021-05-04
Payer: MEDICARE

## 2021-05-04 VITALS — WEIGHT: 170 LBS | HEIGHT: 63 IN | BODY MASS INDEX: 30.12 KG/M2 | TEMPERATURE: 97.6 F

## 2021-05-04 DIAGNOSIS — D48.9 NEOPLASM OF UNCERTAIN BEHAVIOR: ICD-10-CM

## 2021-05-04 DIAGNOSIS — D22.70 MULTIPLE BENIGN MELANOCYTIC NEVI OF UPPER AND LOWER EXTREMITIES AND TRUNK: ICD-10-CM

## 2021-05-04 DIAGNOSIS — L81.4 LENTIGO: ICD-10-CM

## 2021-05-04 DIAGNOSIS — Z85.89 HISTORY OF SQUAMOUS CELL CARCINOMA: Primary | ICD-10-CM

## 2021-05-04 DIAGNOSIS — Z85.828 HISTORY OF BASAL CELL CARCINOMA: ICD-10-CM

## 2021-05-04 DIAGNOSIS — D22.60 MULTIPLE BENIGN MELANOCYTIC NEVI OF UPPER AND LOWER EXTREMITIES AND TRUNK: ICD-10-CM

## 2021-05-04 DIAGNOSIS — D22.5 MULTIPLE BENIGN MELANOCYTIC NEVI OF UPPER AND LOWER EXTREMITIES AND TRUNK: ICD-10-CM

## 2021-05-04 DIAGNOSIS — L57.0 KERATOSIS, ACTINIC: ICD-10-CM

## 2021-05-04 DIAGNOSIS — D18.01 CHERRY ANGIOMA: ICD-10-CM

## 2021-05-04 DIAGNOSIS — L82.1 SEBORRHEIC KERATOSIS: ICD-10-CM

## 2021-05-04 PROCEDURE — 11103 TANGNTL BX SKIN EA SEP/ADDL: CPT | Performed by: DERMATOLOGY

## 2021-05-04 PROCEDURE — 11102 TANGNTL BX SKIN SINGLE LES: CPT | Performed by: DERMATOLOGY

## 2021-05-04 PROCEDURE — 99203 OFFICE O/P NEW LOW 30 MIN: CPT | Performed by: DERMATOLOGY

## 2021-05-04 PROCEDURE — 88305 TISSUE EXAM BY PATHOLOGIST: CPT | Performed by: STUDENT IN AN ORGANIZED HEALTH CARE EDUCATION/TRAINING PROGRAM

## 2021-05-04 PROCEDURE — 17000 DESTRUCT PREMALG LESION: CPT | Performed by: DERMATOLOGY

## 2021-05-04 PROCEDURE — 17003 DESTRUCT PREMALG LES 2-14: CPT | Performed by: DERMATOLOGY

## 2021-05-04 NOTE — PATIENT INSTRUCTIONS
Assessment and Plan:  Based on a thorough discussion of this condition and the management approach to it (including a comprehensive discussion of the known risks, side effects and potential benefits of treatment), the patient (family) agrees to implement the following specific plan:   Yearly skin checks    When outside we recommend using a wide brim hat, sunglasses, long sleeve and pants, sunscreen with SPF 40+ with reapplication every 2 hours, or SPF specific clothing     How can basal cell carcinoma be prevented? The most important way to prevent BCC is to avoid sunburn  This is especially important in childhood and early life  Fair skinned individuals and those with a personal or family history of BCC should protect their skin from sun exposure daily, year-round and lifelong   Stay indoors or under the shade in the middle of the day    Wear covering clothing    Apply high protection factor SPF50+ broad-spectrum sunscreens generously to exposed skin if outdoors    Avoid indoor tanning (sun beds, solaria)   Oral nicotinamide (vitamin B3) in a dose of 500 mg twice daily may reduce the number and severity of BCCs  What is the outlook for basal cell carcinoma? Most BCCs are cured by treatment  Cure is most likely if treatment is undertaken when the lesion is small  About 50% of people with BCC develop a second one within 3 years of the first  They are also at increased risk of other skin cancers, especially melanoma  Regular self-skin examinations and long-term annual skin checks by an experienced health professional are recommended      ssessment and Plan:  Based on a thorough discussion of this condition and the management approach to it (including a comprehensive discussion of the known risks, side effects and potential benefits of treatment), the patient (family) agrees to implement the following specific plan:   Yearly skin check    When outside we recommend using a wide brim hat, sunglasses, long sleeve and pants, sunscreen with SPF 29+ with reapplication every 2 hours, or SPF specific clothing     How can SCC be prevented? The most important way to prevent SCC is to avoid sunburn  This is especially important in childhood and early life  Fair skinned individuals and those with a personal or family history of BCC should protect their skin from sun exposure daily, year-round and lifelong   Stay indoors or under the shade in the middle of the day    Wear covering clothing    Apply high protection factor SPF50+ broad-spectrum sunscreens generously to exposed skin if outdoors    Avoid indoor tanning (sun beds, solaria)      What is the outlook for SCC? Most SCC are cured by treatment  Cure is most likely if treatment is undertaken when the lesion is small  A small percent of SCC's can spread to lymph  nodes and long term monitoring is indicated  They are also at increased risk of other skin cancers, especially melanoma  Regular self-skin examinations and long-term annual skin checks by an experienced health professional are recommended  MUNROE ANGIOMAS     Physical Exam:  · Anatomic Location Affected:  Trunk and extremites  · Morphological Description:  Scattered cherry red papules  · Denies pain, itch, bleeding  No treatments tried  Present for years  Present constantly; no modifying factors which make it worse or better       Assessment and Plan:  Based on a thorough discussion of this condition and the management approach to it (including a comprehensive discussion of the known risks, side effects and potential benefits of treatment), the patient (family) agrees to implement the following specific plan:  · Reassure benign        SEBORRHEIC KERATOSIS; NON-INFLAMED     Physical Exam:  · Anatomic Location Affected:  Trunk and extremities  · Morphological Description:  Waxy, smooth to warty textured, yellow to brownish-grey to dark brown to blackish, discrete, "stuck-on" appearing papules  · Present for years  Denies pain, itch, bleeding  Additional History of Present Condition:  Present constantly; no modifying factors which make it worse or better  No prior treatment  Assessment and Plan:  Based on a thorough discussion of this condition and the management approach to it (including a comprehensive discussion of the known risks, side effects and potential benefits of treatment), the patient (family) agrees to implement the following specific plan:  · Reassure benign  · Use sun protection  Apply SPF 30 or higher at least three times a day  Wear sun protecting clothing and hats  SOLAR LENTIGINES      Physical Exam:   Anatomic Location Affected:  Sun exposed areas of back, chest, arms, legs   Morphological Description:  Multiple scattered brown to tan evenly pigmented macules    Denies pain, itch, bleeding  No treatments tried  Present for months - years  Reports getting newer lesions with sun exposure  Assessment and Plan:  Based on a thorough discussion of this condition and the management approach to it (including a comprehensive discussion of the known risks, side effects and potential benefits of treatment), the patient (family) agrees to implement the following specific plan:  · Reassure benign  · Use sun protection  Apply SPF 30 or higher at least three times a day  Wear sun protecting clothing and hats  MULTIPLE MELANOCYTIC NEVI ("Moles")     Physical Exam:  · Anatomic Location Affected: Trunk and extremities  · Morphological Description:  Scattered, round to ovoid, symmetrical-appearing, even bordered, skin colored to dark brown macules/papules  · Denies pain, itch, bleeding  No treatments tried  Present for years  Present constantly; no modifying factors which make it worse or better  Denies actively changing or growing moles        Assessment and Plan:  Based on a thorough discussion of this condition and the management approach to it (including a comprehensive discussion of the known risks, side effects and potential benefits of treatment), the patient (family) agrees to implement the following specific plan:  · Reassure benign  · Monitor for changes  · Use sun protection  Apply SPF 30 or higher at least three times a day  Wear sun protecting clothing and hats  Worrisome signs of skin malignancy discussed, questions answered  Regular self-skin check discussed  Advised to call or return to office if patient notices any spots of concern, rapidly growing/changing lesions, bleeding lesions, non-healing lesions  Advised regular SPF use  ACTINIC KERATOSIS  Physical Exam:   Anatomic Location Affected:  Right forearm, left dorsal hand, left forearm, forehead, cheek    Morphological Description: Thin pink papule(s) with gritty scale       Assessment and Plan:  Based on a thorough discussion of this condition and the management approach to it (including a comprehensive discussion of the known risks, side effects and potential benefits of treatment), the patient (family) agrees to implement the following specific plan:   Treated with cryotherapy today; written and verbal consent obtained       I   RATIONALE FOR PROCEDURE  I understand that a skin biopsy allows the Dermatologist to test a lesion or rash under the microscope to obtain a diagnosis  It usually involves numbing the area with numbing medication and removing a small piece of skin; sometimes the area will be closed with sutures  In this specific procedure, sutures are not usually needed  If any sutures are placed, then they are usually need to be removed in 2 weeks or less  I understand that my Dermatologist recommends that a skin "shave" biopsy be performed today  A local anesthetic, similar to the kind that a dentist uses when filling a cavity, will be injected with a very small needle into the skin area to be sampled    The injected skin and tissue underneath "will go to sleep and become numb so no pain should be felt afterwards  An instrument shaped like a tiny "razor blade" (shave biopsy instrument) will be used to cut a small piece of tissue and skin from the area so that a sample of tissue can be taken and examined more closely under the microscope  A slight amount of bleeding will occur, but it will be stopped with direct pressure and a pressure bandage and any other appropriate methods  I understands that a scar will form where the wound was created  Surgical ointment will be applied to help protect the wound  Sutures are not usually needed  II   RISKS AND POTENTIAL COMPLICATIONS   I understand the risks and potential complications of a skin biopsy include but are not limited to the following:   Bleeding   Infection   Pain   Scar/keloid   Skin discoloration   Incomplete Removal   Recurrence   Nerve Damage/Numbness/Loss of Function   Allergic Reaction to Anesthesia   Biopsies are diagnostic procedures and based on findings additional treatment or evaluation may be required   Loss or destruction of specimen resulting in no additional findings    My Dermatologist has explained to me the nature of the condition, the nature of the procedure, and the benefits to be reasonably expected compared with alternative approaches  My Dermatologist has discussed the likelihood of major risks or complications of this procedure including the specific risks listed above, such as bleeding, infection, and scarring/keloid  I understand that a scar is expected after this procedure  I understand that my physician cannot predict if the scar will form a "keloid," which extends beyond the borders of the wound that is created  A keloid is a thick, painful, and bumpy scar  A keloid can be difficult to treat, as it does not always respond well to therapy, which includes injecting cortisone directly into the keloid every few weeks    While this usually reduces the pain and size of the scar, it does not eliminate it  I understand that photographs may be taken before and after the procedure  These will be maintained as part of the medical providers confidential records and may not be made available to me  I further authorize the medical provider to use the photographs for teaching purposes or to illustrate scientific papers, books, or lectures if in his/her judgment, medical research, education, or science may benefit from its use  I have had an opportunity to fully inquire about the risks and benefits of this procedure and its alternatives  I have been given ample time and opportunity to ask questions and to seek a second opinion if I wished to do so  I acknowledge that there have specifically been no guarantees as to the cosmetic results from the procedure  I am aware that with any procedure there is always the possibility of an unexpected complication  III  POST-PROCEDURAL CARE (WHAT YOU WILL NEED TO DO "AFTER THE BIOPSY" TO OPTIMIZE HEALING)     Keep the area clean and dry  Try NOT to remove the bandage or get it wet for the first 24 hours   Gently clean the area and apply surgical ointment (such as Vaseline petrolatum ointment, which is available "over the counter" and not a prescription) to the biopsy site for up to 2 weeks straight  This acts to protect the wound from the outside world   Sutures are not usually placed in this procedure  If any sutures were placed, return for suture removal as instructed (generally 1 week for the face, 2 weeks for the body)   Take Acetaminophen (Tylenol) for discomfort, if no contraindications  Ibuprofen or aspirin could make bleeding worse   Call our office immediately for signs of infection: fever, chills, increased redness, warmth, tenderness, discomfort/pain, or pus or foul smell coming from the wound

## 2021-05-04 NOTE — PROGRESS NOTES
[facility] Note     Patient Name: Larry Tong  Encounter Date: 05/04/2021     Have you been cared for by a [facility] in the last 3 years and, if so, which one? No    · Have you traveled outside of the [address] [place] [address] in the past 3 months or outside of the [facility] area in the last 2 weeks? No     May we call your Preferred Phone number to discuss your specific medical information? Yes     May we leave a detailed message that includes your specific medical information? Yes      Today's Chief Concerns:   Concern #1:  Full body check    Concern #2:  Spot of concern     Past Medical History:  Have you personally ever had or currently have any of the following? · Skin cancer (such as Melanoma, Basal Cell Carcinoma, Squamous Cell Carcinoma? (If Yes, please provide more detail)- YES, scc bcc   · Eczema: No  · Psoriasis: No  · HIV/AIDS: No  · Hepatitis B or C: No  · Tuberculosis: No  · Systemic Immunosuppression such as Diabetes, Biologic or Immunotherapy, Chemotherapy, Organ Transplantation, Bone Marrow Transplantation (If YES, please provide more detail): YES, DIABERES   · Radiation Treatment (If YES, please provide more detail): No  · Any other major medical conditions/concerns? (If Yes, which types)- No    Social History:     What is/was your primary occupation? RETIRED      What are your hobbies/past-times? Sewing, fishing     Family History:  Have any of your "first degree relatives" (parent, brother, sister, or child) had any of the following       · Skin cancer such as Melanoma or Merkel Cell Carcinoma or Pancreatic Cancer? YES, basal cell father   · Eczema, Asthma, Hay Fever or Seasonal Allergies: YES, seasonal allergies   · Psoriasis or Psoriatic Arthritis: No  · Do any other medical conditions seem to run in your family? If Yes, what condition and which relatives?   No    Current Medications:   (please update all dermatological medications before printing patient's AVS!)      Current Outpatient Medications:     acetaminophen (TYLENOL) 500 mg tablet, Take 1,000 mg by mouth as needed , Disp: , Rfl:     albuterol (Ventolin HFA) 90 mcg/act inhaler, Inhale 2 puffs every 6 (six) hours as needed for wheezing or shortness of breath, Disp: , Rfl: 0    amLODIPine (NORVASC) 5 mg tablet, TAKE 1 TABLET BY MOUTH EVERY DAY, Disp: 90 tablet, Rfl: 3    b complex vitamins tablet, Take 1 tablet by mouth daily , Disp: , Rfl:     B-D ULTRAFINE III SHORT PEN 31G X 8 MM MISC, USE AS DIRECTED 4 TIMES PER DAY, Disp: , Rfl: 3    doxazosin (CARDURA) 2 mg tablet, TAKE 1 TABLET (2 MG TOTAL) BY MOUTH DAILY AT BEDTIME, Disp: 90 tablet, Rfl: 1    DULoxetine (Cymbalta) 30 mg delayed release capsule, Take 30 mg by mouth daily, Disp: , Rfl:     DULoxetine (CYMBALTA) 60 mg delayed release capsule, Take 90 mg by mouth daily, Disp: , Rfl:     famotidine (PEPCID) 40 MG tablet, Take 1 tablet (40 mg total) by mouth daily Take daily in am, Disp: 90 tablet, Rfl: 3    fluticasone (FLONASE) 50 mcg/act nasal spray, 1-2 sprays daily, Disp: , Rfl:     gabapentin (NEURONTIN) 300 mg capsule, Take 300 mg by mouth 2 (two) times a day , Disp: , Rfl:     Icosapent Ethyl (Vascepa) 1 g CAPS, Take 1 g by mouth 2 (two) times a day, Disp: , Rfl:     insulin aspart (NovoLOG) 100 units/mL injection, Inject under the skin 3 (three) times a day before meals 14 units, 14 units, 18 units  , Disp: , Rfl:     insulin detemir (LEVEMIR) 100 units/mL subcutaneous injection, Inject 32 Units under the skin daily at bedtime (Patient taking differently: Inject 40 Units under the skin daily at bedtime ), Disp: , Rfl: 0    levothyroxine 137 mcg tablet, Take 137 mcg by mouth, Disp: , Rfl:     metoprolol tartrate (LOPRESSOR) 50 mg tablet, Take 1 tablet (50 mg total) by mouth every 12 (twelve) hours, Disp: 180 tablet, Rfl: 3    olmesartan (BENICAR) 40 mg tablet, Take 1 tablet (40 mg total) by mouth daily, Disp: 90 tablet, Rfl: 0    pantoprazole (PROTONIX) 20 mg tablet, Take 1 tablet (20 mg total) by mouth daily, Disp: , Rfl:     pramipexole (MIRAPEX) 0 25 mg tablet, Take 1 tablet (0 25 mg total) by mouth daily, Disp: 90 tablet, Rfl: 3    rosuvastatin (CRESTOR) 5 mg tablet, TAKE 1 TABLET BY MOUTH EVERY DAY AT NIGHT, Disp: , Rfl:     torsemide (DEMADEX) 20 mg tablet, Take 1 tablet (20 mg total) by mouth daily, Disp: 90 tablet, Rfl: 3    Vitamin D, Ergocalciferol, 2000 units CAPS, Take 2,000 Units by mouth daily , Disp: , Rfl:       Review of Systems:  Have you recently had or currently have any of the following? If YES, what are you doing for the problem? · Fever, chills or unintended weight loss: No  · Sudden loss or change in your vision: No  · Nausea, vomiting or blood in your stool: No  · Painful or swollen joints: No  · Wheezing or cough: No  · Changing mole or non-healing wound: No  · Nosebleeds: No  · Excessive sweating: No  · Easy or prolonged bleeding? No  · Over the last 2 weeks, how often have you been bothered by the following problems? · Taking little interest or pleasure in doing things: 2 - Several days  · Feeling down, depressed, or hopeless: 2 - Several days  · Rapid heartbeat with epinephrine:  No    · FEMALES ONLY:    · Are you pregnant or planning to become pregnant? No  · Are you currently or planning to be nursing or breast feeding? No    · Any known allergies? Allergies   Allergen Reactions    Pollen Extract     Ciprofloxacin Hives and Itching         Physical Exam:     Was a chaperone (Derm Clinical Assistant) present throughout the entire Physical Exam? Yes     Did the Dermatology Team specifically  the patient on the importance of a Full Skin Exam to be sure that nothing is missed clinically?  Yes}  o Did the patient ultimately request or accept a Full Skin Exam?  Yes  o Did the patient specifically refuse to have the areas "under-the-bra" examined by the Dermatologist? No  o Did the patient specifically refuse to have the areas "under-the-underwear" examined by the Dermatologist? No    CONSTITUTIONAL:   Vitals:    05/04/21 1424   Temp: 97 6 °F (36 4 °C)   Weight: 77 1 kg (170 lb)   Height: 5' 3" (1 6 m)           PSYCH: Normal mood and affect  EYES: Normal conjunctiva  ENT: Normal lips and oral mucosa  CARDIOVASCULAR: No edema  RESPIRATORY: Normal respirations  HEME/LYMPH/IMMUNO:  No regional lymphadenopathy except as noted below in "ASSESSMENT AND PLAN BY DIAGNOSIS"    SKIN:  FULL ORGAN SYSTEM EXAM  Hair, Scalp, Ears, Face Normal except as noted below in Assessment   Neck, Cervical Chain Nodes Normal except as noted below in Assessment   Right Arm/Hand/Fingers Normal except as noted below in Assessment   Left Arm/Hand/Fingers Normal except as noted below in Assessment   Chest/Breasts/Axillae Viewed areas Normal except as noted below in Assessment   Abdomen, Umbilicus Normal except as noted below in Assessment   Back/Spine Normal except as noted below in Assessment   Groin/Genitalia/Buttocks NOT EXAMINED   Right Leg, Foot, Toes Normal except as noted below in Assessment   Left Leg, Foot, Toes Normal except as noted below in Assessment        Assessment and Plan by Diagnosis:    History of Present Condition:     Duration:  How long has this been an issue for you?    o  3 months    Location Affected:  Where on the body is this affecting you?    o  forehead    Quality:  Is there any bleeding, pain, itch, burning/irritation, or redness associated with the skin lesion? o  itching    Severity:  Describe any bleeding, pain, itch, burning/irritation, or redness on a scale of 1 to 10 (with 10 being the worst)    o  4   Timing:  Does this condition seem to be there pretty constantly or do you notice it more at specific times throughout the day?    o  evenings    Context:  Have you ever noticed that this condition seems to be associated with specific activities you do?    o  denies  Modifying Factors:    o Anything that seems to make the condition worse?    -  denies   o What have you tried to do to make the condition better?    -  moisturize    Associated Signs and Symptoms:  Does this skin lesion seem to be associated with any of the following:  o  SL AMB DERM SIGNS AND SYMPTOMS: Itching and Scratching         1  HISTORY OF BASAL CELL CARCINOMA    Physical Exam:   Anatomic Location Affected:  Left cheek    Morphological Description of scar:  Well healed scar    Suspected Recurrence: No   Pertinent Positives:   Pertinent Negatives: Additional History of Present Condition:  History of basal cell carcinoma with no sign of recurrence    Assessment and Plan:  Based on a thorough discussion of this condition and the management approach to it (including a comprehensive discussion of the known risks, side effects and potential benefits of treatment), the patient (family) agrees to implement the following specific plan:   Yearly skin checks    When outside we recommend using a wide brim hat, sunglasses, long sleeve and pants, sunscreen with SPF 03+ with reapplication every 2 hours, or SPF specific clothing       2  HISTORY OF SQUAMOUS CELL CARCINOMA     Physical Exam:   Anatomic Location Affected:  Left cheek    Morphological Description of Scar:  Well healed scar    Suspected Recurrence: no   Regional adenopathy: no    Assessment and Plan:  Based on a thorough discussion of this condition and the management approach to it (including a comprehensive discussion of the known risks, side effects and potential benefits of treatment), the patient (family) agrees to implement the following specific plan:   Yearly skin check    When outside we recommend using a wide brim hat, sunglasses, long sleeve and pants, sunscreen with SPF 30+ with reapplication every 2 hours, or SPF specific clothing         3   MUNROE ANGIOMAS     Physical Exam:  · Anatomic Location Affected:  Trunk and extremites  · Morphological Description:  Scattered cherry red papules  · Denies pain, itch, bleeding  No treatments tried  Present for years  Present constantly; no modifying factors which make it worse or better  Assessment and Plan:  Based on a thorough discussion of this condition and the management approach to it (including a comprehensive discussion of the known risks, side effects and potential benefits of treatment), the patient (family) agrees to implement the following specific plan:  · Reassure benign        4  SEBORRHEIC KERATOSIS; NON-INFLAMED     Physical Exam:  · Anatomic Location Affected:  Trunk and extremities  · Morphological Description:  Waxy, smooth to warty textured, yellow to brownish-grey to dark brown to blackish, discrete, "stuck-on" appearing papules  · Present for years  Denies pain, itch, bleeding  Additional History of Present Condition:  Present constantly; no modifying factors which make it worse or better  No prior treatment  Assessment and Plan:  Based on a thorough discussion of this condition and the management approach to it (including a comprehensive discussion of the known risks, side effects and potential benefits of treatment), the patient (family) agrees to implement the following specific plan:  · Reassure benign  · Use sun protection  Apply SPF 30 or higher at least three times a day  Wear sun protecting clothing and hats  5  SOLAR LENTIGINES      Physical Exam:   Anatomic Location Affected:  Sun exposed areas of back, chest, arms, legs   Morphological Description:  Multiple scattered brown to tan evenly pigmented macules    Denies pain, itch, bleeding  No treatments tried  Present for months - years  Reports getting newer lesions with sun exposure           Assessment and Plan:  Based on a thorough discussion of this condition and the management approach to it (including a comprehensive discussion of the known risks, side effects and potential benefits of treatment), the patient (family) agrees to implement the following specific plan:  · Reassure benign  · Use sun protection  Apply SPF 30 or higher at least three times a day  Wear sun protecting clothing and hats  6  MULTIPLE MELANOCYTIC NEVI ("Moles")     Physical Exam:  · Anatomic Location Affected: Trunk and extremities  · Morphological Description:  Scattered, round to ovoid, symmetrical-appearing, even bordered, skin colored to dark brown macules/papules  · Denies pain, itch, bleeding  No treatments tried  Present for years  Present constantly; no modifying factors which make it worse or better  Denies actively changing or growing moles  Assessment and Plan:  Based on a thorough discussion of this condition and the management approach to it (including a comprehensive discussion of the known risks, side effects and potential benefits of treatment), the patient (family) agrees to implement the following specific plan:  · Reassure benign  · Monitor for changes  · Use sun protection  Apply SPF 30 or higher at least three times a day  Wear sun protecting clothing and hats  Worrisome signs of skin malignancy discussed, questions answered  Regular self-skin check discussed  Advised to call or return to office if patient notices any spots of concern, rapidly growing/changing lesions, bleeding lesions, non-healing lesions  Advised regular SPF use  7  ACTINIC KERATOSIS  Physical Exam:   Anatomic Location Affected:  Right forearm, left dorsal hand, left forearm, forehead, both cheeks    Morphological Description:   Thin pink papule(s) with gritty scale       Assessment and Plan:  Based on a thorough discussion of this condition and the management approach to it (including a comprehensive discussion of the known risks, side effects and potential benefits of treatment), the patient (family) agrees to implement the following specific plan:   Treated with cryotherapy today; written and verbal consent obtained     PROCEDURE:  DESTRUCTION OF PRE-MALIGNANT LESIONS  After a thorough discussion of treatment options and risk/benefits/alternatives (including but not limited to local pain, scarring, dyspigmentation, blistering, and possible superinfection), verbal and written consent were obtained and the aforementioned lesions were treated on with cryotherapy using liquid nitrogen x 2 cycles for 5-10 seconds  The patient tolerated the procedure well, and after-care instructions were provided   TOTAL NUMBER of 12 pre-malignant lesions were treated today on the ANATOMIC LOCATION: Right forearm, left dorsal hand, left forearm, forehead, both cheeks        Patient instructions: Your pre-cancerous lesions (called actinic keratosis) were treated with liquid nitrogen today  The treated areas will get more red, crusted over the next few days  There might be some blistering  Apply vaseline to the treated area for the next week to help it heal fully  Do not pick at the area  Return in 3-4 weeks for another round of liquid nitrogen treatment if lesion(s)  fails to fully resolve  8  NEOPLASM OF UNCERTAIN BEHAVIOR OF SKIN    Physical Exam:   (Anatomic Location); (Size and Morphological Description); (Differential Diagnosis):  o Specimen A; Left nasal tip; 0 5 cm erythematous papule; Diffdx rule out basal cell carcinoma   o Specimen B; left mid forehead; scaly erythematous thin papule; diffdx; actinic keratosis versus squamous cell carcinoma   Additional History of Present Condition:  Patient states she has had lesion on her nose for a while, has not tried     Assessment and Plan:   I have discussed with the patient that a sample of skin via a "skin biopsy would be potentially helpful to further make a specific diagnosis under the microscope     Based on a thorough discussion of this condition and the management approach to it (including a comprehensive discussion of the known risks, side effects and potential benefits of treatment), the patient (family) agrees to implement the following specific plan:    o Procedure:  Skin Biopsy  After a thorough discussion of treatment options and risk/benefits/alternatives (including but not limited to local pain, scarring, dyspigmentation, blistering, possible superinfection, and inability to confirm a diagnosis via histopathology), verbal and written consent were obtained and portion of the rash was biopsied for tissue sample  See below for consent that was obtained from patient and subsequent Procedure Note  PROCEDURE SHAVE BIOPSY NOTE:     Performing Physician: Kerwin Cooper   Anatomic Location; Clinical Description with size (cm); Pre-Op Diagnosis:   o Specimen A; Left nasal tip; 0 5 cm erythematous papule; Diffdx rule out basal cell carcinoma   Post-op diagnosis: Same      Local anesthesia: 1% xylocaine with epi       Topical anesthesia: None     Hemostasis: Aluminum chloride       After obtaining informed consent  at which time there was a discussion about the purpose of biopsy  and low risks of infection and bleeding  The area was prepped and draped in the usual fashion  Anesthesia was obtained with 1% lidocaine with epinephrine  A shave biopsy to an appropriate sampling depth was obtained with a sterile blade (such as a 15-blade or DermaBlade)  The resulting wound was covered with surgical ointment and bandaged appropriately  The patient tolerated the procedure well without complications and was without signs of functional compromise  Specimen has been sent for review by Dermatopathology  Standard post-procedure care has been explained and has been included in written form within the patient's copy of Informed Consent  INFORMED CONSENT DISCUSSION AND POST-OPERATIVE INSTRUCTIONS FOR PATIENT    I   RATIONALE FOR PROCEDURE  I understand that a skin biopsy allows the Dermatologist to test a lesion or rash under the microscope to obtain a diagnosis  It usually involves numbing the area with numbing medication and removing a small piece of skin; sometimes the area will be closed with sutures  In this specific procedure, sutures are not usually needed  If any sutures are placed, then they are usually need to be removed in 2 weeks or less  I understand that my Dermatologist recommends that a skin "shave" biopsy be performed today  A local anesthetic, similar to the kind that a dentist uses when filling a cavity, will be injected with a very small needle into the skin area to be sampled  The injected skin and tissue underneath "will go to sleep and become numb so no pain should be felt afterwards  An instrument shaped like a tiny "razor blade" (shave biopsy instrument) will be used to cut a small piece of tissue and skin from the area so that a sample of tissue can be taken and examined more closely under the microscope  A slight amount of bleeding will occur, but it will be stopped with direct pressure and a pressure bandage and any other appropriate methods  I understands that a scar will form where the wound was created  Surgical ointment will be applied to help protect the wound  Sutures are not usually needed  II   RISKS AND POTENTIAL COMPLICATIONS   I understand the risks and potential complications of a skin biopsy include but are not limited to the following:   Bleeding   Infection   Pain   Scar/keloid   Skin discoloration   Incomplete Removal   Recurrence   Nerve Damage/Numbness/Loss of Function   Allergic Reaction to Anesthesia   Biopsies are diagnostic procedures and based on findings additional treatment or evaluation may be required   Loss or destruction of specimen resulting in no additional findings    My Dermatologist has explained to me the nature of the condition, the nature of the procedure, and the benefits to be reasonably expected compared with alternative approaches    My Dermatologist has discussed the likelihood of major risks or complications of this procedure including the specific risks listed above, such as bleeding, infection, and scarring/keloid  I understand that a scar is expected after this procedure  I understand that my physician cannot predict if the scar will form a "keloid," which extends beyond the borders of the wound that is created  A keloid is a thick, painful, and bumpy scar  A keloid can be difficult to treat, as it does not always respond well to therapy, which includes injecting cortisone directly into the keloid every few weeks  While this usually reduces the pain and size of the scar, it does not eliminate it  I understand that photographs may be taken before and after the procedure  These will be maintained as part of the medical providers confidential records and may not be made available to me  I further authorize the medical provider to use the photographs for teaching purposes or to illustrate scientific papers, books, or lectures if in his/her judgment, medical research, education, or science may benefit from its use  I have had an opportunity to fully inquire about the risks and benefits of this procedure and its alternatives  I have been given ample time and opportunity to ask questions and to seek a second opinion if I wished to do so  I acknowledge that there have specifically been no guarantees as to the cosmetic results from the procedure  I am aware that with any procedure there is always the possibility of an unexpected complication  III  POST-PROCEDURAL CARE (WHAT YOU WILL NEED TO DO "AFTER THE BIOPSY" TO OPTIMIZE HEALING)     Keep the area clean and dry  Try NOT to remove the bandage or get it wet for the first 24 hours   Gently clean the area and apply surgical ointment (such as Vaseline petrolatum ointment, which is available "over the counter" and not a prescription) to the biopsy site for up to 2 weeks straight    This acts to protect the wound from the outside world   Sutures are not usually placed in this procedure  If any sutures were placed, return for suture removal as instructed (generally 1 week for the face, 2 weeks for the body)   Take Acetaminophen (Tylenol) for discomfort, if no contraindications  Ibuprofen or aspirin could make bleeding worse   Call our office immediately for signs of infection: fever, chills, increased redness, warmth, tenderness, discomfort/pain, or pus or foul smell coming from the wound  WHAT TO DO IF THERE IS ANY BLEEDING? If a small amount of bleeding is noticed, place a clean cloth over the area and apply firm pressure for ten minutes  Check the wound after 10 minutes of direct pressure  If bleeding persists, try one more time for an additional 10 minutes of direct pressure on the area  If the bleeding becomes heavier or does not stop after the second attempt, or if you have any other questions about this procedure, then please call your [facility]'s Dermatologist by calling [phone] (SKIN)  I hereby acknowledge that I have reviewed and verified the site with my Dermatologist and have requested and authorized my Dermatologist to proceed with the procedure  PROCEDURE SHAVE BIOPSY NOTE:     Performing Physician: Alma Alonzo    Anatomic Location; Clinical Description with size (cm); Pre-Op Diagnosis:   o Specimen B; left mid forehead; scaly erythematous thin papule; diffdx; actinic keratosis versus squamous cell carcinoma      Post-op diagnosis: Same      Local anesthesia: 1% xylocaine with epi       Topical anesthesia: None     Hemostasis: Aluminum chloride       After obtaining informed consent  at which time there was a discussion about the purpose of biopsy  and low risks of infection and bleeding  The area was prepped and draped in the usual fashion  Anesthesia was obtained with 1% lidocaine with epinephrine   A shave biopsy to an appropriate sampling depth was obtained with a sterile blade (such as a 15-blade or DermaBlade)  The resulting wound was covered with surgical ointment and bandaged appropriately  The patient tolerated the procedure well without complications and was without signs of functional compromise  Specimen has been sent for review by Dermatopathology  Standard post-procedure care has been explained and has been included in written form within the patient's copy of Informed Consent      Scribe Attestation    I,:  Dax Archibald MA am acting as a scribe while in the presence of the attending physician :       I,:  Brooke Martinez MD personally performed the services described in this documentation    as scribed in my presence :

## 2021-05-05 ENCOUNTER — HOSPITAL ENCOUNTER (OUTPATIENT)
Dept: MAMMOGRAPHY | Facility: HOSPITAL | Age: 71
Discharge: HOME | End: 2021-05-05
Attending: FAMILY MEDICINE
Payer: MEDICARE

## 2021-05-05 VITALS — WEIGHT: 170 LBS | BODY MASS INDEX: 30.12 KG/M2 | HEIGHT: 63 IN

## 2021-05-05 DIAGNOSIS — Z12.31 VISIT FOR SCREENING MAMMOGRAM: ICD-10-CM

## 2021-05-05 PROCEDURE — 77063 BREAST TOMOSYNTHESIS BI: CPT

## 2021-05-05 PROCEDURE — 77067 SCR MAMMO BI INCL CAD: CPT

## 2021-05-06 ENCOUNTER — TELEPHONE (OUTPATIENT)
Dept: DERMATOLOGY | Facility: CLINIC | Age: 71
End: 2021-05-06

## 2021-05-06 NOTE — RESULT ENCOUNTER NOTE
Felipe Chopra,  Your mammogram was normal and you will be due for a repeat mammogram in one year  Have a great day,  Susu Bowen MD

## 2021-05-06 NOTE — TELEPHONE ENCOUNTER
Called patient to discuss biopsy results  Patient agreeable to mohs  Please schedule for Mohs on both sites     Place on a recall list for skin check in 6 months    Thank you!

## 2021-05-10 ENCOUNTER — TELEPHONE (OUTPATIENT)
Dept: DERMATOLOGY | Facility: CLINIC | Age: 71
End: 2021-05-10

## 2021-05-10 NOTE — TELEPHONE ENCOUNTER
Pre- operative Mohs Telephone Scheduling Note    Do you have a pacemaker or defibrillator? no    Do you take antibiotics before skin or dental procedures? no  If yes, will likely require pre-operative antibiotics  Ask  the patient why they take the antibiotics (usually because of joint replacement)  Do you have a history of a joint replacements within the past 2 years? no   If yes, will likely require pre-operative antibiotics  Ask if orthopaedic surgeon has prescribed pre-operative antibiotics to take before procedures/dental work? Do you take any OTC medications that thin your blood (Aspirin, Aleve, Ibuprofen) or supplements that thin your blood (fish oil, garlic, vitamin E, Ginko Biloba)? no    Do you take any prescribed medications that thin your blood (Coumadin, Plavix, Xarelto, Eliquis or another prescribed blood thinner)? no    Do you have an allergy to lidocaine or epinephrine? no    Do you have an allergy to shellfish? no    Do you smoke? no      If yes,  patient to try and stop 2 days before surgery and 7 days after the surgery  Minimizing smoking as much as possible during this time will improve healing and the cosmetic result after surgery  Date scheduled: 05/20/2021 for the [facility] on the left mid forehead and 05/27/2021 for the [facility] on the left nasal tip     Coordination of Care with other provider (Oculoplastics, Plastics, ENT) required? no   IF YES, PLEASE FORWARD TO APPROPRIATE PERSONNEL TO HELP COORDINATE  Are there remaining tumors to be scheduled?  no

## 2021-05-13 ENCOUNTER — ANESTHESIA EVENT (OUTPATIENT)
Dept: GASTROENTEROLOGY | Facility: HOSPITAL | Age: 71
End: 2021-05-13

## 2021-05-13 ENCOUNTER — HOSPITAL ENCOUNTER (OUTPATIENT)
Dept: GASTROENTEROLOGY | Facility: HOSPITAL | Age: 71
Setting detail: OUTPATIENT SURGERY
Discharge: HOME | End: 2021-05-13
Attending: INTERNAL MEDICINE | Admitting: INTERNAL MEDICINE
Payer: MEDICARE

## 2021-05-13 ENCOUNTER — ANESTHESIA (OUTPATIENT)
Dept: GASTROENTEROLOGY | Facility: HOSPITAL | Age: 71
End: 2021-05-13

## 2021-05-13 VITALS
OXYGEN SATURATION: 97 % | HEART RATE: 56 BPM | WEIGHT: 170 LBS | DIASTOLIC BLOOD PRESSURE: 70 MMHG | RESPIRATION RATE: 17 BRPM | SYSTOLIC BLOOD PRESSURE: 150 MMHG | BODY MASS INDEX: 30.12 KG/M2 | TEMPERATURE: 97.8 F | HEIGHT: 63 IN

## 2021-05-13 DIAGNOSIS — K21.9 GASTROESOPHAGEAL REFLUX DISEASE WITHOUT ESOPHAGITIS: ICD-10-CM

## 2021-05-13 DIAGNOSIS — K21.9 GASTROESOPHAGEAL REFLUX DISEASE, UNSPECIFIED WHETHER ESOPHAGITIS PRESENT: ICD-10-CM

## 2021-05-13 DIAGNOSIS — Z87.19 HISTORY OF REPAIR OF HIATAL HERNIA: ICD-10-CM

## 2021-05-13 DIAGNOSIS — Z98.890 HISTORY OF REPAIR OF HIATAL HERNIA: ICD-10-CM

## 2021-05-13 DIAGNOSIS — K31.84 GASTROPARESIS: ICD-10-CM

## 2021-05-13 PROCEDURE — 43236 UPPR GI SCOPE W/SUBMUC INJ: CPT | Performed by: INTERNAL MEDICINE

## 2021-05-13 RX ORDER — PROPOFOL 10 MG/ML
INJECTION, EMULSION INTRAVENOUS CONTINUOUS PRN
Status: DISCONTINUED | OUTPATIENT
Start: 2021-05-13 | End: 2021-05-13

## 2021-05-13 RX ORDER — SODIUM CHLORIDE, SODIUM LACTATE, POTASSIUM CHLORIDE, CALCIUM CHLORIDE 600; 310; 30; 20 MG/100ML; MG/100ML; MG/100ML; MG/100ML
INJECTION, SOLUTION INTRAVENOUS CONTINUOUS PRN
Status: DISCONTINUED | OUTPATIENT
Start: 2021-05-13 | End: 2021-05-13

## 2021-05-13 RX ORDER — PROPOFOL 10 MG/ML
INJECTION, EMULSION INTRAVENOUS AS NEEDED
Status: DISCONTINUED | OUTPATIENT
Start: 2021-05-13 | End: 2021-05-13

## 2021-05-13 RX ADMIN — SODIUM CHLORIDE, SODIUM LACTATE, POTASSIUM CHLORIDE, AND CALCIUM CHLORIDE: .6; .31; .03; .02 INJECTION, SOLUTION INTRAVENOUS at 09:47

## 2021-05-13 RX ADMIN — ONABOTULINUMTOXINA 200 UNITS: 100 INJECTION, POWDER, LYOPHILIZED, FOR SOLUTION INTRADERMAL; INTRAMUSCULAR at 09:57

## 2021-05-13 RX ADMIN — PROPOFOL 100 MCG/KG/MIN: 10 INJECTION, EMULSION INTRAVENOUS at 09:51

## 2021-05-13 RX ADMIN — PROPOFOL 40 MG: 10 INJECTION, EMULSION INTRAVENOUS at 09:51

## 2021-05-13 NOTE — DISCHARGE INSTRUCTIONS
Diabetic Gastroparesis   AMBULATORY CARE:   Diabetic gastroparesis  is a type of nerve damage that slows digestion  High blood sugar levels from diabetes can damage nerves and tissues in your stomach  The damage prevents your stomach from emptying normally  Gastroparesis is also called delayed gastric emptying  Your symptoms may be worse if you drink alcohol or smoke  Common signs and symptoms:   · Constipation that may be replaced, at times, by diarrhea    · High or low blood sugar levels that you cannot control    · Nausea, vomiting, or loss of appetite    · Bloated or early full feeling while you eat    · Sudden cramps, swelling, or pain in your abdomen    · Heartburn    Seek care immediately if:   · You are vomiting more severely or for a longer period than usual     · You urinate less than usual, and your mouth is dry  · You feel dizzy and weak, or you have fainted  · You have severe pain in your stomach or abdomen  Call your doctor or diabetes care team if:   · Your blood sugar level is higher or lower than healthcare providers have told you it should be  · You continue to have pain and bloating in your abdomen  · You continue to have nausea and vomiting, or you are not able to eat  · You have questions or concerns about your condition or care  Treatment:  Your healthcare provider may change one or more of your current medicines  Do not  change your medicines without direction from your provider  Any of the following may also be used to treat gastroparesis:  · Medicines:      ? Motility medicines  help your stomach muscles move food and liquids out of your stomach faster  These medicines also may help you digest food better  ? Nausea medicine  helps calm your stomach and prevent vomiting  ? An antibiotic  may be given for a short time to help your stomach empty more quickly  · A feeding tube  may be needed if your stomach cannot process food   You may need the feeding tube for a short time, until your stomach starts working properly  You may instead need a long-term feeding tube if your gastroparesis is severe  Ask for more information about feeding tubes  · Stomach stimulation  may be helpful if your symptoms are severe and other treatments do not work  Surgery is needed to implant a device in your abdomen  The device sends mild signals to the nerves and muscles in your stomach to relieve nausea and vomiting  Manage your symptoms:   · Walk after you eat  This may help speed digestion  · Follow the meal plan  that your healthcare or dietitian gave you  This meal plan can help decrease your symptoms  The following may also help you manage your symptoms:    ? Eat less fat and fiber  High-fat and high-fiber foods may be hard for your stomach to digest  You may need to avoid fruits and vegetables such as oranges and broccoli  ? Eat 4 to 6 small meals a day  Smaller, more frequent meals are easier for your stomach to handle  ? Drink more liquids with your meals  Your healthcare provider may recommend liquid meals, such as soup  Liquid is easier to digest than solid food  ? Ask if you should prepare your food in a   Blended foods are easier to digest  Ask for directions on which foods to use and how to blend the food correctly  ? Ask about vitamins  you may need and how to add them to your meals  · Do not smoke  Nicotine can damage blood vessels, slow your digestion, and make it more difficult to manage diabetes  Do not use e-cigarettes or smokeless tobacco in place of cigarettes or to help you quit  They still contain nicotine  Ask your healthcare provider for information if you currently smoke and need help quitting  · Do not drink alcohol  Alcohol may slow your digestion more  · Follow your diabetes treatment plan  You may need to check your blood sugar more often   High blood sugar levels slow digestion and can make your symptoms worse     Follow up with your doctor or diabetes care team as directed:  Write down your questions so you remember to ask them during your visits  © Copyright [address] Information is for End User's use only and may not be sold, redistributed or otherwise used for commercial purposes  All illustrations and images included in CareNotes® are the copyrighted property of [employer]  or [facility] [place] [facility]   The above information is an  only  It is not intended as medical advice for individual conditions or treatments  Talk to your doctor, nurse or pharmacist before following any medical regimen to see if it is safe and effective for you

## 2021-05-13 NOTE — ANESTHESIA POSTPROCEDURE EVALUATION
Post-Op Assessment Note    Pain Score: 0    Pain management: adequate     Mental Status:  Alert   Hydration Status:  Stable   PONV Controlled:  None   Airway Patency:  Patent      Post Op Vitals Reviewed: Yes      Staff: CRNA         No complications documented      BP  150/69   Temp  97 3   Pulse  68   Resp   14   SpO2   94% room air

## 2021-05-13 NOTE — H&P
History and Physical -  [facility]  Sasha Ardon 66 y o  female MRN: [id number]    HPI: Sasha Ardon is a 66y o  year old female who presents for evaluation of gastroparesis  Review of Systems    Historical Information   Past Medical History:   Diagnosis Date    Allergic     Anxiety     Arthritis     Aspiration into airway     Basal cell carcinoma 2007    left cheek     Chronic kidney disease 2000, 2018    Stones, kidney disease stage 3    Diabetes mellitus ([facility] )     Disease of thyroid gland     Family history of thyroid problem     GERD (gastroesophageal reflux disease)     Heart burn     [facility]plasia, [facility]athyroid [facility]     Hypertension     Kidney problem     Obesity (BMI 30 0-34  9)     Pneumonia     RLS (restless legs syndrome)     Seasonal allergies     Sleep apnea     uses CPAP    Squamous cell skin cancer 2007    left cheek     Swollen ankles      Past Surgical History:   Procedure Laterality Date    ARTHROSCOPY KNEE      BREAST BIOPSY      stereotactic-benign    BREAST BIOPSY      stereo-benign    BREAST EXCISIONAL BIOPSY      unknown date-benign    BREAST EXCISIONAL BIOPSY      unknown date-benign    BREAST EXCISIONAL BIOPSY      unknown date-benign    BREAST EXCISIONAL BIOPSY      unknown age-benign    CHOLECYSTECTOMY      HIATAL HERNIA REPAIR      LIPOSUCTION      REDUCTION MAMMAPLASTY Bilateral 2000    SKIN CANCER EXCISION  2007    squamous cell carcinoma     SKIN CANCER EXCISION  2007    basal cell carcinoma    SQUAMOUS CELL CARCINOMA EXCISION      TOE SURGERY      TONSILLECTOMY      TUBAL LIGATION       Social History   Social History     Substance and Sexual Activity   Alcohol Use Yes    Alcohol/week: 0 0 standard drinks    Frequency: Monthly or less    Drinks per session: 1 or 2    Binge frequency: Never    Comment: 1 or 2 a year     Social History     Substance and Sexual Activity   Drug Use No     Social History     Tobacco Use Smoking Status Former Smoker    Packs/day: 2 00    Years: 10 00    Pack years: 20 00    Types: Cigarettes    Start date: 1967   Xavi Huston Quit date: 11    Years since quittin 3   Smokeless Tobacco Never Used     Family History   Problem Relation Age of Onset    Heart disease Mother     Depression Mother     Hypertension Mother     COPD Mother     Hearing loss Mother     Heart disease Father     Lung cancer Father 79        Smoker     Cancer Father         brain    Alcohol abuse Father     Dementia Father     Hypertension Father     Thyroid disease Father     COPD Father     Arthritis Father     Brain cancer Father 76    Hypertension Sister     Diabetes Sister     Heart disease Sister     Thyroid disease Sister     Cancer Sister         Lympoma    Lung cancer Sister 78    Hypertension Brother     Diabetes Brother     Cancer Brother         Throat    Dementia Brother     Stroke Brother     Hypertension Brother     No Known Problems Son     No Known Problems Son     Heart disease Brother     Diabetes Brother     Brain cancer Paternal Aunt         unknown age       Meds/Allergies     (Not in a hospital admission)      Allergies   Allergen Reactions    Pollen Extract     Ciprofloxacin Hives and Itching       Objective     There were no vitals taken for this visit  PHYSICAL EXAM    Gen: NAD  CV: RRR  CHEST: Clear  ABD: soft, NT/ND  EXT: no edema  Neuro: AAO      ASSESSMENT/PLAN:  This is a 66y o  year old female here for evaluation of gastroparesis  PLAN:   Procedure:  EGD with Botox

## 2021-05-13 NOTE — ANESTHESIA PREPROCEDURE EVALUATION
Procedure:  EGD    Relevant Problems   CARDIO   (+) Benign hypertension with CKD (chronic kidney disease) stage III   (+) Essential hypertension   (+) Pulmonary hypertension (HCC)      ENDO   (+) Type 2 diabetes mellitus with diabetic chronic kidney disease (HCC)   (+) Type 2 diabetes mellitus with diabetic nephropathy, with long-term current use of insulin (HCC)      GI/HEPATIC   (+) GERD (gastroesophageal reflux disease)      /RENAL   (+) Anemia of chronic renal failure, stage 3 (moderate) (HCC)   (+) CKD (chronic kidney disease) stage 3, GFR 30-59 ml/min (HCC)   (+) Hypertensive chronic kidney disease with stage 1 through stage 4 chronic kidney disease, or unspecified chronic kidney disease   (+) Stage 4 chronic kidney disease (HCC)      HEMATOLOGY   (+) Anemia in stage 4 chronic kidney disease (HCC)   (+) Anemia of chronic renal failure, stage 3 (moderate) (HCC)      MUSCULOSKELETAL   (+) Fibromyalgia      NEURO/PSYCH   (+) Depression, recurrent (HCC)   (+) Fibromyalgia   (+) History of kidney stones      PULMONARY   (+) Chronic respiratory failure with hypoxia (HCC)   (+) Dyspnea   (+) BARBARA on CPAP        Physical Exam    Airway    Mallampati score: II  TM Distance: >3 FB  Neck ROM: full     Dental       Cardiovascular      Pulmonary      Other Findings        Anesthesia Plan  ASA Score- 3     Anesthesia Type- IV sedation with anesthesia with ASA Monitors  Additional Monitors:   Airway Plan:           Plan Factors-    Chart reviewed  Induction- intravenous  Postoperative Plan-     Informed Consent- Anesthetic plan and risks discussed with patient  I personally reviewed this patient with the CRNA  Discussed and agreed on the Anesthesia Plan with the CRNA  Satinder Orr

## 2021-05-17 ENCOUNTER — HOSPITAL ENCOUNTER (OUTPATIENT)
Facility: HOSPITAL | Age: 71
Setting detail: OBSERVATION
Discharge: HOME | End: 2021-05-18
Attending: EMERGENCY MEDICINE | Admitting: INTERNAL MEDICINE
Payer: MEDICARE

## 2021-05-17 ENCOUNTER — APPOINTMENT (EMERGENCY)
Dept: RADIOLOGY | Facility: HOSPITAL | Age: 71
End: 2021-05-17
Payer: MEDICARE

## 2021-05-17 DIAGNOSIS — N18.9 ACUTE KIDNEY INJURY SUPERIMPOSED ON CHRONIC KIDNEY DISEASE (HCC): ICD-10-CM

## 2021-05-17 DIAGNOSIS — N18.9 ACUTE-ON-CHRONIC KIDNEY INJURY (HCC): Primary | ICD-10-CM

## 2021-05-17 DIAGNOSIS — J18.9 PNEUMONITIS: ICD-10-CM

## 2021-05-17 DIAGNOSIS — K31.84 GASTROPARESIS: ICD-10-CM

## 2021-05-17 DIAGNOSIS — N17.9 ACUTE KIDNEY INJURY SUPERIMPOSED ON CHRONIC KIDNEY DISEASE (HCC): ICD-10-CM

## 2021-05-17 DIAGNOSIS — K21.9 GASTROESOPHAGEAL REFLUX DISEASE WITHOUT ESOPHAGITIS: ICD-10-CM

## 2021-05-17 DIAGNOSIS — N17.9 ACUTE-ON-CHRONIC KIDNEY INJURY (HCC): Primary | ICD-10-CM

## 2021-05-17 PROBLEM — R10.13 EPIGASTRIC ABDOMINAL PAIN: Status: ACTIVE | Noted: 2021-05-17

## 2021-05-17 PROBLEM — R05.9 COUGH: Status: ACTIVE | Noted: 2021-05-17

## 2021-05-17 PROBLEM — N18.30 CKD (CHRONIC KIDNEY DISEASE) STAGE 3, GFR 30-59 ML/MIN (HCC): Status: RESOLVED | Noted: 2020-05-08 | Resolved: 2021-05-17

## 2021-05-17 PROBLEM — J44.9 COPD (CHRONIC OBSTRUCTIVE PULMONARY DISEASE) (HCC): Chronic | Status: ACTIVE | Noted: 2021-05-17

## 2021-05-17 LAB
ALBUMIN SERPL BCP-MCNC: 3.9 G/DL (ref 3.5–5)
ALP SERPL-CCNC: 94 U/L (ref 46–116)
ALT SERPL W P-5'-P-CCNC: 31 U/L (ref 12–78)
ANION GAP SERPL CALCULATED.3IONS-SCNC: 10 MMOL/L (ref 4–13)
APTT PPP: 26 SECONDS (ref 23–37)
AST SERPL W P-5'-P-CCNC: 29 U/L (ref 5–45)
BACTERIA UR QL AUTO: ABNORMAL /HPF
BASOPHILS # BLD AUTO: 0.03 THOUSANDS/ΜL (ref 0–0.1)
BASOPHILS NFR BLD AUTO: 0 % (ref 0–1)
BILIRUB SERPL-MCNC: 0.26 MG/DL (ref 0.2–1)
BILIRUB UR QL STRIP: NEGATIVE
BUN SERPL-MCNC: 54 MG/DL (ref 5–25)
CALCIUM SERPL-MCNC: 8.6 MG/DL (ref 8.3–10.1)
CHLORIDE SERPL-SCNC: 101 MMOL/L (ref 100–108)
CLARITY UR: CLEAR
CO2 SERPL-SCNC: 30 MMOL/L (ref 21–32)
COLOR UR: YELLOW
CREAT SERPL-MCNC: 2.66 MG/DL (ref 0.6–1.3)
EOSINOPHIL # BLD AUTO: 0.27 THOUSAND/ΜL (ref 0–0.61)
EOSINOPHIL NFR BLD AUTO: 4 % (ref 0–6)
ERYTHROCYTE [DISTWIDTH] IN BLOOD BY AUTOMATED COUNT: 14.9 % (ref 11.6–15.1)
EST. AVERAGE GLUCOSE BLD GHB EST-MCNC: 143 MG/DL
GFR SERPL CREATININE-BSD FRML MDRD: 17 ML/MIN/1.73SQ M
GLUCOSE SERPL-MCNC: 115 MG/DL (ref 65–140)
GLUCOSE SERPL-MCNC: 118 MG/DL (ref 65–140)
GLUCOSE SERPL-MCNC: 133 MG/DL (ref 65–140)
GLUCOSE SERPL-MCNC: 194 MG/DL (ref 65–140)
GLUCOSE SERPL-MCNC: 196 MG/DL (ref 65–140)
GLUCOSE UR STRIP-MCNC: NEGATIVE MG/DL
HBA1C MFR BLD: 6.6 %
HCT VFR BLD AUTO: 38.8 % (ref 34.8–46.1)
HGB BLD-MCNC: 12.1 G/DL (ref 11.5–15.4)
HGB UR QL STRIP.AUTO: NEGATIVE
IMM GRANULOCYTES # BLD AUTO: 0.02 THOUSAND/UL (ref 0–0.2)
IMM GRANULOCYTES NFR BLD AUTO: 0 % (ref 0–2)
INR PPP: 0.94 (ref 0.84–1.19)
KETONES UR STRIP-MCNC: NEGATIVE MG/DL
LACTATE SERPL-SCNC: 1.7 MMOL/L (ref 0.5–2)
LEUKOCYTE ESTERASE UR QL STRIP: ABNORMAL
LIPASE SERPL-CCNC: 35 U/L (ref 73–393)
LYMPHOCYTES # BLD AUTO: 1.9 THOUSANDS/ΜL (ref 0.6–4.47)
LYMPHOCYTES NFR BLD AUTO: 25 % (ref 14–44)
MCH RBC QN AUTO: 27.3 PG (ref 26.8–34.3)
MCHC RBC AUTO-ENTMCNC: 31.2 G/DL (ref 31.4–37.4)
MCV RBC AUTO: 87 FL (ref 82–98)
MONOCYTES # BLD AUTO: 0.54 THOUSAND/ΜL (ref 0.17–1.22)
MONOCYTES NFR BLD AUTO: 7 % (ref 4–12)
NEUTROPHILS # BLD AUTO: 4.94 THOUSANDS/ΜL (ref 1.85–7.62)
NEUTS SEG NFR BLD AUTO: 64 % (ref 43–75)
NITRITE UR QL STRIP: NEGATIVE
NON-SQ EPI CELLS URNS QL MICRO: ABNORMAL /HPF
NRBC BLD AUTO-RTO: 0 /100 WBCS
PH UR STRIP.AUTO: 5.5 [PH] (ref 4.5–8)
PLATELET # BLD AUTO: 259 THOUSANDS/UL (ref 149–390)
PMV BLD AUTO: 11.1 FL (ref 8.9–12.7)
POTASSIUM SERPL-SCNC: 3.9 MMOL/L (ref 3.5–5.3)
PROT SERPL-MCNC: 7.6 G/DL (ref 6.4–8.2)
PROT UR STRIP-MCNC: ABNORMAL MG/DL
PROTHROMBIN TIME: 12.6 SECONDS (ref 11.6–14.5)
RBC # BLD AUTO: 4.44 MILLION/UL (ref 3.81–5.12)
RBC #/AREA URNS AUTO: ABNORMAL /HPF
SODIUM SERPL-SCNC: 141 MMOL/L (ref 136–145)
SP GR UR STRIP.AUTO: 1.01 (ref 1–1.03)
UROBILINOGEN UR QL STRIP.AUTO: 0.2 E.U./DL
WBC # BLD AUTO: 7.7 THOUSAND/UL (ref 4.31–10.16)
WBC #/AREA URNS AUTO: ABNORMAL /HPF

## 2021-05-17 PROCEDURE — 83690 ASSAY OF LIPASE: CPT | Performed by: PHYSICIAN ASSISTANT

## 2021-05-17 PROCEDURE — 87040 BLOOD CULTURE FOR BACTERIA: CPT | Performed by: PHYSICIAN ASSISTANT

## 2021-05-17 PROCEDURE — 99284 EMERGENCY DEPT VISIT MOD MDM: CPT

## 2021-05-17 PROCEDURE — 93005 ELECTROCARDIOGRAM TRACING: CPT

## 2021-05-17 PROCEDURE — 87077 CULTURE AEROBIC IDENTIFY: CPT | Performed by: PHYSICIAN ASSISTANT

## 2021-05-17 PROCEDURE — C9113 INJ PANTOPRAZOLE SODIUM, VIA: HCPCS | Performed by: PHYSICIAN ASSISTANT

## 2021-05-17 PROCEDURE — 83605 ASSAY OF LACTIC ACID: CPT | Performed by: PHYSICIAN ASSISTANT

## 2021-05-17 PROCEDURE — C9113 INJ PANTOPRAZOLE SODIUM, VIA: HCPCS | Performed by: INTERNAL MEDICINE

## 2021-05-17 PROCEDURE — 87086 URINE CULTURE/COLONY COUNT: CPT | Performed by: PHYSICIAN ASSISTANT

## 2021-05-17 PROCEDURE — 87186 SC STD MICRODIL/AGAR DIL: CPT | Performed by: PHYSICIAN ASSISTANT

## 2021-05-17 PROCEDURE — 36415 COLL VENOUS BLD VENIPUNCTURE: CPT | Performed by: PHYSICIAN ASSISTANT

## 2021-05-17 PROCEDURE — 81001 URINALYSIS AUTO W/SCOPE: CPT

## 2021-05-17 PROCEDURE — 85730 THROMBOPLASTIN TIME PARTIAL: CPT | Performed by: PHYSICIAN ASSISTANT

## 2021-05-17 PROCEDURE — 85610 PROTHROMBIN TIME: CPT | Performed by: PHYSICIAN ASSISTANT

## 2021-05-17 PROCEDURE — 80053 COMPREHEN METABOLIC PANEL: CPT | Performed by: PHYSICIAN ASSISTANT

## 2021-05-17 PROCEDURE — 99220 PR INITIAL OBSERVATION CARE/DAY 70 MINUTES: CPT | Performed by: INTERNAL MEDICINE

## 2021-05-17 PROCEDURE — 94640 AIRWAY INHALATION TREATMENT: CPT

## 2021-05-17 PROCEDURE — 96375 TX/PRO/DX INJ NEW DRUG ADDON: CPT

## 2021-05-17 PROCEDURE — 82948 REAGENT STRIP/BLOOD GLUCOSE: CPT

## 2021-05-17 PROCEDURE — 99214 OFFICE O/P EST MOD 30 MIN: CPT | Performed by: INTERNAL MEDICINE

## 2021-05-17 PROCEDURE — 85025 COMPLETE CBC W/AUTO DIFF WBC: CPT | Performed by: PHYSICIAN ASSISTANT

## 2021-05-17 PROCEDURE — 83036 HEMOGLOBIN GLYCOSYLATED A1C: CPT | Performed by: INTERNAL MEDICINE

## 2021-05-17 PROCEDURE — 96365 THER/PROPH/DIAG IV INF INIT: CPT

## 2021-05-17 PROCEDURE — 71045 X-RAY EXAM CHEST 1 VIEW: CPT

## 2021-05-17 PROCEDURE — 99285 EMERGENCY DEPT VISIT HI MDM: CPT | Performed by: PHYSICIAN ASSISTANT

## 2021-05-17 RX ORDER — DULOXETIN HYDROCHLORIDE 30 MG/1
30 CAPSULE, DELAYED RELEASE ORAL DAILY
Status: DISCONTINUED | OUTPATIENT
Start: 2021-05-17 | End: 2021-05-17

## 2021-05-17 RX ORDER — METOCLOPRAMIDE HYDROCHLORIDE 5 MG/ML
5 INJECTION INTRAMUSCULAR; INTRAVENOUS
Status: DISCONTINUED | OUTPATIENT
Start: 2021-05-17 | End: 2021-05-18 | Stop reason: HOSPADM

## 2021-05-17 RX ORDER — PANTOPRAZOLE SODIUM 20 MG/1
20 TABLET, DELAYED RELEASE ORAL
Status: DISCONTINUED | OUTPATIENT
Start: 2021-05-17 | End: 2021-05-17

## 2021-05-17 RX ORDER — POLYETHYLENE GLYCOL 3350 17 G/17G
17 POWDER, FOR SOLUTION ORAL DAILY PRN
Status: DISCONTINUED | OUTPATIENT
Start: 2021-05-17 | End: 2021-05-18

## 2021-05-17 RX ORDER — ONDANSETRON 2 MG/ML
4 INJECTION INTRAMUSCULAR; INTRAVENOUS ONCE
Status: COMPLETED | OUTPATIENT
Start: 2021-05-17 | End: 2021-05-17

## 2021-05-17 RX ORDER — LIDOCAINE HYDROCHLORIDE 20 MG/ML
15 SOLUTION OROPHARYNGEAL ONCE
Status: COMPLETED | OUTPATIENT
Start: 2021-05-17 | End: 2021-05-17

## 2021-05-17 RX ORDER — PRAVASTATIN SODIUM 40 MG
40 TABLET ORAL
Status: DISCONTINUED | OUTPATIENT
Start: 2021-05-17 | End: 2021-05-18 | Stop reason: HOSPADM

## 2021-05-17 RX ORDER — ICOSAPENT ETHYL 1000 MG/1
1 CAPSULE ORAL 2 TIMES DAILY
Status: DISCONTINUED | OUTPATIENT
Start: 2021-05-17 | End: 2021-05-17 | Stop reason: RX

## 2021-05-17 RX ORDER — PANTOPRAZOLE SODIUM 40 MG/1
40 INJECTION, POWDER, FOR SOLUTION INTRAVENOUS
Status: DISCONTINUED | OUTPATIENT
Start: 2021-05-18 | End: 2021-05-17

## 2021-05-17 RX ORDER — MELATONIN
1000 2 TIMES DAILY
Status: DISCONTINUED | OUTPATIENT
Start: 2021-05-17 | End: 2021-05-18 | Stop reason: HOSPADM

## 2021-05-17 RX ORDER — SODIUM CHLORIDE, SODIUM LACTATE, POTASSIUM CHLORIDE, CALCIUM CHLORIDE 600; 310; 30; 20 MG/100ML; MG/100ML; MG/100ML; MG/100ML
75 INJECTION, SOLUTION INTRAVENOUS CONTINUOUS
Status: DISCONTINUED | OUTPATIENT
Start: 2021-05-17 | End: 2021-05-18 | Stop reason: HOSPADM

## 2021-05-17 RX ORDER — HYDROXYZINE HYDROCHLORIDE 25 MG/1
25 TABLET, FILM COATED ORAL EVERY 6 HOURS PRN
Status: DISCONTINUED | OUTPATIENT
Start: 2021-05-17 | End: 2021-05-18 | Stop reason: HOSPADM

## 2021-05-17 RX ORDER — HEPARIN SODIUM 5000 [USP'U]/ML
5000 INJECTION, SOLUTION INTRAVENOUS; SUBCUTANEOUS EVERY 8 HOURS SCHEDULED
Status: DISCONTINUED | OUTPATIENT
Start: 2021-05-17 | End: 2021-05-18 | Stop reason: HOSPADM

## 2021-05-17 RX ORDER — PRAMIPEXOLE DIHYDROCHLORIDE 0.25 MG/1
0.25 TABLET ORAL DAILY
Status: DISCONTINUED | OUTPATIENT
Start: 2021-05-17 | End: 2021-05-18 | Stop reason: HOSPADM

## 2021-05-17 RX ORDER — CHLORAL HYDRATE 500 MG
1000 CAPSULE ORAL 2 TIMES DAILY
Status: DISCONTINUED | OUTPATIENT
Start: 2021-05-17 | End: 2021-05-18 | Stop reason: HOSPADM

## 2021-05-17 RX ORDER — GABAPENTIN 300 MG/1
300 CAPSULE ORAL 2 TIMES DAILY
Status: DISCONTINUED | OUTPATIENT
Start: 2021-05-17 | End: 2021-05-18 | Stop reason: HOSPADM

## 2021-05-17 RX ORDER — AMLODIPINE BESYLATE 5 MG/1
5 TABLET ORAL DAILY
Status: DISCONTINUED | OUTPATIENT
Start: 2021-05-17 | End: 2021-05-18 | Stop reason: HOSPADM

## 2021-05-17 RX ORDER — PANTOPRAZOLE SODIUM 40 MG/1
40 INJECTION, POWDER, FOR SOLUTION INTRAVENOUS EVERY 12 HOURS SCHEDULED
Status: DISCONTINUED | OUTPATIENT
Start: 2021-05-17 | End: 2021-05-18 | Stop reason: HOSPADM

## 2021-05-17 RX ORDER — MAGNESIUM HYDROXIDE/ALUMINUM HYDROXICE/SIMETHICONE 120; 1200; 1200 MG/30ML; MG/30ML; MG/30ML
30 SUSPENSION ORAL ONCE
Status: COMPLETED | OUTPATIENT
Start: 2021-05-17 | End: 2021-05-17

## 2021-05-17 RX ORDER — MAGNESIUM HYDROXIDE/ALUMINUM HYDROXICE/SIMETHICONE 120; 1200; 1200 MG/30ML; MG/30ML; MG/30ML
30 SUSPENSION ORAL EVERY 4 HOURS PRN
Status: DISCONTINUED | OUTPATIENT
Start: 2021-05-17 | End: 2021-05-18 | Stop reason: HOSPADM

## 2021-05-17 RX ORDER — METRONIDAZOLE 500 MG/1
500 TABLET ORAL ONCE
Status: COMPLETED | OUTPATIENT
Start: 2021-05-17 | End: 2021-05-17

## 2021-05-17 RX ORDER — AMOXICILLIN 250 MG
1 CAPSULE ORAL 2 TIMES DAILY
Status: DISCONTINUED | OUTPATIENT
Start: 2021-05-17 | End: 2021-05-18 | Stop reason: HOSPADM

## 2021-05-17 RX ORDER — HYDROXYZINE HYDROCHLORIDE 25 MG/1
25 TABLET, FILM COATED ORAL EVERY 6 HOURS PRN
Status: DISCONTINUED | OUTPATIENT
Start: 2021-05-17 | End: 2021-05-17

## 2021-05-17 RX ORDER — METOPROLOL TARTRATE 50 MG/1
50 TABLET, FILM COATED ORAL EVERY 12 HOURS SCHEDULED
Status: DISCONTINUED | OUTPATIENT
Start: 2021-05-17 | End: 2021-05-18 | Stop reason: HOSPADM

## 2021-05-17 RX ORDER — DOXAZOSIN MESYLATE 1 MG/1
2 TABLET ORAL
Status: DISCONTINUED | OUTPATIENT
Start: 2021-05-17 | End: 2021-05-18 | Stop reason: HOSPADM

## 2021-05-17 RX ORDER — ALBUTEROL SULFATE 2.5 MG/3ML
5 SOLUTION RESPIRATORY (INHALATION) ONCE
Status: COMPLETED | OUTPATIENT
Start: 2021-05-17 | End: 2021-05-17

## 2021-05-17 RX ORDER — PANTOPRAZOLE SODIUM 40 MG/1
40 INJECTION, POWDER, FOR SOLUTION INTRAVENOUS ONCE
Status: COMPLETED | OUTPATIENT
Start: 2021-05-17 | End: 2021-05-17

## 2021-05-17 RX ORDER — ALBUTEROL SULFATE 90 UG/1
2 AEROSOL, METERED RESPIRATORY (INHALATION) EVERY 6 HOURS PRN
Status: DISCONTINUED | OUTPATIENT
Start: 2021-05-17 | End: 2021-05-18 | Stop reason: HOSPADM

## 2021-05-17 RX ADMIN — METOCLOPRAMIDE HYDROCHLORIDE 5 MG: 5 INJECTION INTRAMUSCULAR; INTRAVENOUS at 22:03

## 2021-05-17 RX ADMIN — LIDOCAINE HYDROCHLORIDE 15 ML: 20 SOLUTION ORAL; TOPICAL at 04:45

## 2021-05-17 RX ADMIN — INSULIN LISPRO 1 UNITS: 100 INJECTION, SOLUTION INTRAVENOUS; SUBCUTANEOUS at 22:05

## 2021-05-17 RX ADMIN — METOCLOPRAMIDE HYDROCHLORIDE 5 MG: 5 INJECTION INTRAMUSCULAR; INTRAVENOUS at 12:30

## 2021-05-17 RX ADMIN — PANTOPRAZOLE SODIUM 40 MG: 40 INJECTION, POWDER, FOR SOLUTION INTRAVENOUS at 12:30

## 2021-05-17 RX ADMIN — OMEGA-3 FATTY ACIDS CAP 1000 MG 1000 MG: 1000 CAP at 08:45

## 2021-05-17 RX ADMIN — OMEGA-3 FATTY ACIDS CAP 1000 MG 1000 MG: 1000 CAP at 18:03

## 2021-05-17 RX ADMIN — PANTOPRAZOLE SODIUM 40 MG: 40 INJECTION, POWDER, FOR SOLUTION INTRAVENOUS at 04:38

## 2021-05-17 RX ADMIN — SODIUM CHLORIDE 250 ML: 0.9 INJECTION, SOLUTION INTRAVENOUS at 05:32

## 2021-05-17 RX ADMIN — HEPARIN SODIUM 5000 UNITS: 5000 INJECTION INTRAVENOUS; SUBCUTANEOUS at 22:03

## 2021-05-17 RX ADMIN — METOPROLOL TARTRATE 50 MG: 50 TABLET, FILM COATED ORAL at 08:45

## 2021-05-17 RX ADMIN — METOCLOPRAMIDE HYDROCHLORIDE 5 MG: 5 INJECTION INTRAMUSCULAR; INTRAVENOUS at 16:59

## 2021-05-17 RX ADMIN — IPRATROPIUM BROMIDE 0.5 MG: 0.5 SOLUTION RESPIRATORY (INHALATION) at 04:15

## 2021-05-17 RX ADMIN — DOXAZOSIN 2 MG: 1 TABLET ORAL at 22:03

## 2021-05-17 RX ADMIN — ALBUTEROL SULFATE 2 PUFF: 90 AEROSOL, METERED RESPIRATORY (INHALATION) at 20:39

## 2021-05-17 RX ADMIN — PRAVASTATIN SODIUM 40 MG: 40 TABLET ORAL at 16:58

## 2021-05-17 RX ADMIN — ALBUTEROL SULFATE 5 MG: 2.5 SOLUTION RESPIRATORY (INHALATION) at 04:15

## 2021-05-17 RX ADMIN — HYDROXYZINE HYDROCHLORIDE 25 MG: 25 TABLET, FILM COATED ORAL at 14:38

## 2021-05-17 RX ADMIN — HEPARIN SODIUM 5000 UNITS: 5000 INJECTION INTRAVENOUS; SUBCUTANEOUS at 08:48

## 2021-05-17 RX ADMIN — ALUMINA, MAGNESIA, AND SIMETHICONE ORAL SUSPENSION REGULAR STRENGTH 30 ML: 1200; 1200; 120 SUSPENSION ORAL at 11:50

## 2021-05-17 RX ADMIN — CEFTRIAXONE SODIUM 1000 MG: 10 INJECTION, POWDER, FOR SOLUTION INTRAVENOUS at 04:45

## 2021-05-17 RX ADMIN — ONDANSETRON 4 MG: 2 INJECTION INTRAMUSCULAR; INTRAVENOUS at 04:38

## 2021-05-17 RX ADMIN — AMLODIPINE BESYLATE 5 MG: 5 TABLET ORAL at 20:36

## 2021-05-17 RX ADMIN — PRAMIPEXOLE DIHYDROCHLORIDE 0.25 MG: 0.25 TABLET ORAL at 16:58

## 2021-05-17 RX ADMIN — GABAPENTIN 300 MG: 300 CAPSULE ORAL at 16:59

## 2021-05-17 RX ADMIN — ALBUTEROL SULFATE 2 PUFF: 90 AEROSOL, METERED RESPIRATORY (INHALATION) at 11:46

## 2021-05-17 RX ADMIN — DULOXETINE HYDROCHLORIDE 90 MG: 60 CAPSULE, DELAYED RELEASE ORAL at 08:46

## 2021-05-17 RX ADMIN — Medication 1000 UNITS: at 08:45

## 2021-05-17 RX ADMIN — HEPARIN SODIUM 5000 UNITS: 5000 INJECTION INTRAVENOUS; SUBCUTANEOUS at 12:30

## 2021-05-17 RX ADMIN — LEVOTHYROXINE SODIUM 137 MCG: 25 TABLET ORAL at 08:45

## 2021-05-17 RX ADMIN — PANTOPRAZOLE SODIUM 40 MG: 40 INJECTION, POWDER, FOR SOLUTION INTRAVENOUS at 22:03

## 2021-05-17 RX ADMIN — SODIUM CHLORIDE, SODIUM LACTATE, POTASSIUM CHLORIDE, AND CALCIUM CHLORIDE 75 ML/HR: .6; .31; .03; .02 INJECTION, SOLUTION INTRAVENOUS at 12:39

## 2021-05-17 RX ADMIN — Medication 1000 UNITS: at 22:04

## 2021-05-17 RX ADMIN — METOPROLOL TARTRATE 50 MG: 50 TABLET, FILM COATED ORAL at 22:04

## 2021-05-17 RX ADMIN — GABAPENTIN 300 MG: 300 CAPSULE ORAL at 22:05

## 2021-05-17 RX ADMIN — DOCUSATE SODIUM AND SENNOSIDES 1 TABLET: 8.6; 5 TABLET, FILM COATED ORAL at 18:03

## 2021-05-17 RX ADMIN — METRONIDAZOLE 500 MG: 500 TABLET ORAL at 04:45

## 2021-05-17 RX ADMIN — ALUMINA, MAGNESIA, AND SIMETHICONE ORAL SUSPENSION REGULAR STRENGTH 30 ML: 1200; 1200; 120 SUSPENSION ORAL at 04:45

## 2021-05-17 NOTE — ASSESSMENT & PLAN NOTE
Lab Results   Component Value Date    HGBA1C 7 3 (H) 01/04/2021       No results for input(s): POCGLU in the last 72 hours  Blood Sugar Average: Last 72 hrs:     Home insulin regimen:  NovoLog 14 units t i d  With meals, Levemir 32 units q h s  · Will hold home insulin regimen and start on sliding scale insulin give patient will be NPO for now    · Monitor blood glucose  · Hypoglycemia protocol

## 2021-05-17 NOTE — ASSESSMENT & PLAN NOTE
secondary to acid reflux vs aspiration pneumonitis  CXR does not appear to reveal any pulmonary infiltrates on my review though official read is pending  She remains afebrile and SpO2 at 97 percent on room air  In ED was given a dose of ceftriaxone    - follow-up with official CXR read  - supportive care  - monitor temp, SpO2   - see above for rest of plan

## 2021-05-17 NOTE — ASSESSMENT & PLAN NOTE
This is a 79-year-old female with a history of gastroparesis and GERD who presents with epigastric pain and abdominal distention which started overnight  Reports associated cough worse on lying down, likely due to reflux  No -bloody stool, -diarrhea, nausea/vomiting  In ED was given Mylanta which improved patient's discomfort  Pertinent labs:  Lipase decreased at 35  Pertinent imaging findings:  CXR - does not appear to have any infiltrates though official read is pending  · Make NPO until evaluated by GI  Will likely need to start with small frequent meals given history of gastroparesis    · GI consult  · Continue home Protonix, will add Mylanta  · IVF hydration  · FOBT

## 2021-05-17 NOTE — ASSESSMENT & PLAN NOTE
Lab Results   Component Value Date    EGFR 17 05/17/2021    EGFR 36 07/15/2020    EGFR 36 07/14/2020    CREATININE 2 66 (H) 05/17/2021    CREATININE 1 47 (H) 07/15/2020    CREATININE 1 46 (H) 07/14/2020

## 2021-05-17 NOTE — ASSESSMENT & PLAN NOTE
Lab Results   Component Value Date    EGFR 17 05/17/2021    EGFR 36 07/15/2020    EGFR 36 07/14/2020    CREATININE 2 66 (H) 05/17/2021    CREATININE 1 47 (H) 07/15/2020    CREATININE 1 46 (H) 07/14/2020     Baseline creatinine ranges from 1 5-1 9, documented to have CKD stage 3b  POA creatinine 2 66, EGFR 17 which puts her at CKD stage 4  Reports she last saw her nephrologist 2 weeks ago    · IV fluids at maintenance  · Trend creatinine  · Hold nephrotoxic agents and avoid hypotension

## 2021-05-17 NOTE — ED PROVIDER NOTES
EMERGENCY MEDICINE NOTE        PATIENT IDENTIFICATION PHYSICIAN/SERVICE INFORMATION   Name: Tripp Harrison  MRN: [id number]  YOB: 1950  Age/Sex: 66 y o  female  Preferred Language: English  Code Status: Prior  Encounter Date: 5/17/2021  Attending Physician: Endy Davila MD  Admitting Physician: Melissa Tran MD   Primary Care Physician: Jenn Ellis MD         Primary Care Phone: [phone] [address] [facility]     Chief Complaint   Patient presents with    Heartburn     had a piece of chocolate last night woke up with burning is stomach and esophagus          HISTORY OF PRESENT ILLNESS     Tripp Harrison is a 66 y o  female who presents due to SOB, Cough, Heartburn  Pt reports episode of reflux, aspiration while attempting to sleep at 0200 today after having chocolate last night  Pt notes she recently had botox of pyloris, has been diligent with small meals not eating close to bedtime, though tonight had chocolate before bed and suspects this caused her to aspirate, coughing and wheezing after aspirated reflux  Pt reports one similar episode in past with admission, later requiring intubation  Pt significant PMH hiatal hernia with fundoplication, N9RD, gastroparesis with pyloris botox 3/2021, GERD, CKD  Denies f/c/s, cp, diarrhea, black/bloody stools, syncope, palpitations, headache, numbness/weakness, and no other complaints at this time          History provided by:  Patient and significant other   used: No          PAST MEDICAL AND SURGICAL HISTORY     Past Medical History:   Diagnosis Date    Allergic     Anxiety     Arthritis     Aspiration into airway     Basal cell carcinoma 2007    left cheek     Chronic kidney disease 2000, 2018    Stones, kidney disease stage 3    Diabetes mellitus ([facility] [address] )     Disease of thyroid gland     Family history of thyroid problem     GERD (gastroesophageal reflux disease)     Heart burn     Hyperplasia, parathyroid ([facility] [address] )  Hypertension     Kidney problem     Obesity (BMI 30 0-34  9)     Pneumonia     RLS (restless legs syndrome)     Seasonal allergies     Sleep apnea     uses CPAP    Squamous cell skin cancer 2007    left cheek     Swollen ankles        Past Surgical History:   Procedure Laterality Date    ARTHROSCOPY KNEE      BREAST BIOPSY      stereotactic-benign    BREAST BIOPSY      stereo-benign    BREAST EXCISIONAL BIOPSY      unknown date-benign    BREAST EXCISIONAL BIOPSY      unknown date-benign    BREAST EXCISIONAL BIOPSY      unknown date-benign    BREAST EXCISIONAL BIOPSY      unknown age-benign    CHOLECYSTECTOMY      HIATAL HERNIA REPAIR      LIPOSUCTION      REDUCTION MAMMAPLASTY Bilateral 2000    SKIN CANCER EXCISION  2007    squamous cell carcinoma     SKIN CANCER EXCISION  2007    basal cell carcinoma    SQUAMOUS CELL CARCINOMA EXCISION      TOE SURGERY      TONSILLECTOMY      TUBAL LIGATION         Family History   Problem Relation Age of Onset    Heart disease Mother     Depression Mother     Hypertension Mother     COPD Mother     Hearing loss Mother     Heart disease Father     Lung cancer Father 79        Smoker     Cancer Father         brain    Alcohol abuse Father     Dementia Father     Hypertension Father     Thyroid disease Father     COPD Father     Arthritis Father     Brain cancer Father 76    Hypertension Sister     Diabetes Sister     Heart disease Sister     Thyroid disease Sister     Cancer Sister         Lympoma    Lung cancer Sister 78    Hypertension Brother     Diabetes Brother     Cancer Brother         Throat    Dementia Brother     Stroke Brother     Hypertension Brother     No Known Problems Son     No Known Problems Son     Heart disease Brother     Diabetes Brother     Brain cancer Paternal Aunt         unknown age       E-Cigarette/Vaping    E-Cigarette Use Never User      E-Cigarette/Vaping Substances    Nicotine No     THC No     CBD No     Flavoring No     Other No     Unknown No      Social History     Tobacco Use    Smoking status: Former Smoker     Packs/day: 2 00     Years: 10 00     Pack years: 20 00     Types: Cigarettes     Start date: 1967     Quit date:      Years since quittin 4    Smokeless tobacco: Never Used   Substance Use Topics    Alcohol use: Yes     Alcohol/week: 0 0 standard drinks     Frequency: Monthly or less     Drinks per session: 1 or 2     Binge frequency: Never     Comment: 1 or 2 a year    Drug use: No         ALLERGIES     Allergies   Allergen Reactions    Pollen Extract     Ciprofloxacin Hives and Itching         HOME MEDICATIONS     Prior to Admission Medications   Prescriptions Last Dose Informant Patient Reported? Taking?    B-D ULTRAFINE III SHORT PEN 31G X 8 MM MISC  Self Yes No   Sig: USE AS DIRECTED 4 TIMES PER DAY   DULoxetine (CYMBALTA) 60 mg delayed release capsule  Self Yes No   Sig: Take 90 mg by mouth daily   DULoxetine (Cymbalta) 30 mg delayed release capsule   Yes No   Sig: Take 30 mg by mouth daily   Icosapent Ethyl (Vascepa) 1 g CAPS  Self Yes No   Sig: Take 1 g by mouth 2 (two) times a day   Vitamin D, Ergocalciferol, 2000 units CAPS  Self Yes No   Sig: Take 2,000 Units by mouth daily    acetaminophen (TYLENOL) 500 mg tablet  Self Yes No   Sig: Take 1,000 mg by mouth as needed    albuterol (Ventolin HFA) 90 mcg/act inhaler  Self No No   Sig: Inhale 2 puffs every 6 (six) hours as needed for wheezing or shortness of breath   amLODIPine (NORVASC) 5 mg tablet   No No   Sig: TAKE 1 TABLET BY MOUTH EVERY DAY   b complex vitamins tablet  Self Yes No   Sig: Take 1 tablet by mouth daily    doxazosin (CARDURA) 2 mg tablet   No No   Sig: TAKE 1 TABLET (2 MG TOTAL) BY MOUTH DAILY AT BEDTIME   famotidine (PEPCID) 40 MG tablet   No No   Sig: Take 1 tablet (40 mg total) by mouth daily Take daily in am   fluticasone (FLONASE) 50 mcg/act nasal spray  Self Yes No   Si-2 sprays daily   gabapentin (NEURONTIN) 300 mg capsule  Self Yes No   Sig: Take 300 mg by mouth 2 (two) times a day    insulin aspart (NovoLOG) 100 units/mL injection  Self Yes No   Sig: Inject under the skin 3 (three) times a day before meals 14 units, 14 units, 18 units  insulin detemir (LEVEMIR) 100 units/mL subcutaneous injection  Self No No   Sig: Inject 32 Units under the skin daily at bedtime   Patient taking differently: Inject 40 Units under the skin daily at bedtime    levothyroxine 137 mcg tablet   Yes No   Sig: Take 137 mcg by mouth   metoprolol tartrate (LOPRESSOR) 50 mg tablet   No No   Sig: Take 1 tablet (50 mg total) by mouth every 12 (twelve) hours   olmesartan (BENICAR) 40 mg tablet   No No   Sig: Take 1 tablet (40 mg total) by mouth daily   pantoprazole (PROTONIX) 20 mg tablet   No No   Sig: Take 1 tablet (20 mg total) by mouth daily   pramipexole (MIRAPEX) 0 25 mg tablet  Self No No   Sig: Take 1 tablet (0 25 mg total) by mouth daily   rosuvastatin (CRESTOR) 5 mg tablet  Self Yes No   Sig: TAKE 1 TABLET BY MOUTH EVERY DAY AT NIGHT   torsemide (DEMADEX) 20 mg tablet  Self No No   Sig: Take 1 tablet (20 mg total) by mouth daily      Facility-Administered Medications: None         REVIEW OF SYSTEMS     Review of Systems   Constitutional: Negative for activity change, appetite change, chills, diaphoresis, fatigue and fever  HENT: Negative for congestion, drooling, ear discharge, ear pain, facial swelling, postnasal drip, rhinorrhea, sinus pressure, sinus pain, sore throat, trouble swallowing and voice change  Eyes: Negative for discharge and visual disturbance  Respiratory: Positive for cough, shortness of breath and wheezing  Negative for apnea, choking, chest tightness and stridor  Cardiovascular: Negative for chest pain, palpitations and leg swelling     Gastrointestinal: Positive for abdominal distention (chronic worse, though passing flatus, last bowel movement yesterday, normal), abdominal pain and nausea  Negative for anal bleeding, blood in stool, constipation, diarrhea, rectal pain and vomiting  Genitourinary: Negative  Musculoskeletal: Negative for arthralgias, joint swelling and neck pain  Skin: Negative for rash  Neurological: Negative for dizziness, weakness, light-headedness, numbness and headaches  Psychiatric/Behavioral: The patient is not nervous/anxious  All other systems reviewed and are negative  PHYSICAL EXAMINATION     ED Triage Vitals   Temperature Pulse Respirations Blood Pressure SpO2   05/17/21 0337 05/17/21 0337 05/17/21 0337 05/17/21 0337 05/17/21 0337   97 9 °F (36 6 °C) 65 20 140/74 97 %      Temp Source Heart Rate Source Patient Position - Orthostatic VS BP Location FiO2 (%)   05/17/21 0337 05/17/21 0337 05/17/21 0337 05/17/21 0337 --   Oral Monitor Sitting Right arm       Pain Score       05/17/21 0623       8         Wt Readings from Last 3 Encounters:   05/17/21 79 kg (174 lb 2 6 oz)   05/13/21 77 1 kg (170 lb)   05/05/21 77 1 kg (170 lb)         Physical Exam  Vitals signs and nursing note reviewed  Constitutional:       General: She is not in acute distress  Appearance: She is well-developed  She is obese  She is not ill-appearing, toxic-appearing or diaphoretic  HENT:      Head: Normocephalic and atraumatic  Mouth/Throat:      Mouth: Mucous membranes are moist    Eyes:      Conjunctiva/sclera: Conjunctivae normal       Pupils: Pupils are equal, round, and reactive to light  Neck:      Musculoskeletal: Normal range of motion and neck supple  Cardiovascular:      Rate and Rhythm: Normal rate and regular rhythm  Pulses: Normal pulses  Heart sounds: Normal heart sounds  Pulmonary:      Effort: Pulmonary effort is normal  No respiratory distress  Breath sounds: Wheezing and rhonchi (RLLF) present  Abdominal:      General: Bowel sounds are normal  There is distension  Palpations: Abdomen is soft   Abdomen is not rigid  There is no mass  Tenderness: There is abdominal tenderness (epigastric, mild)  There is no right CVA tenderness, left CVA tenderness, guarding or rebound  Negative signs include Asher's sign and McBurney's sign  Hernia: No hernia is present  Comments: Negative Asher's  Negative Appendiceal signs (Psoas, Rovsing's, Obturator)  Negative Peritoneal Signs   Musculoskeletal: Normal range of motion  Skin:     General: Skin is warm and dry  Capillary Refill: Capillary refill takes less than 2 seconds  Findings: No rash  Neurological:      General: No focal deficit present  Mental Status: She is alert and oriented to person, place, and time  Mental status is at baseline  Sensory: No sensory deficit  Motor: No weakness             DIAGNOSTIC RESULTS     Laboratory results:    Labs Reviewed   CBC AND DIFFERENTIAL - Abnormal       Result Value Ref Range Status    WBC 7 70  4 31 - 10 16 Thousand/uL Final    RBC 4 44  3 81 - 5 12 Million/uL Final    Hemoglobin 12 1  11 5 - 15 4 g/dL Final    Hematocrit 38 8  34 8 - 46 1 % Final    MCV 87  82 - 98 fL Final    MCH 27 3  26 8 - 34 3 pg Final    MCHC 31 2 (*) 31 4 - 37 4 g/dL Final    RDW 14 9  11 6 - 15 1 % Final    MPV 11 1  8 9 - 12 7 fL Final    Platelets 120  610 - 390 Thousands/uL Final    nRBC 0  /100 WBCs Final    Neutrophils Relative 64  43 - 75 % Final    Immat GRANS % 0  0 - 2 % Final    Lymphocytes Relative 25  14 - 44 % Final    Monocytes Relative 7  4 - 12 % Final    Eosinophils Relative 4  0 - 6 % Final    Basophils Relative 0  0 - 1 % Final    Neutrophils Absolute 4 94  1 85 - 7 62 Thousands/µL Final    Immature Grans Absolute 0 02  0 00 - 0 20 Thousand/uL Final    Lymphocytes Absolute 1 90  0 60 - 4 47 Thousands/µL Final    Monocytes Absolute 0 54  0 17 - 1 22 Thousand/µL Final    Eosinophils Absolute 0 27  0 00 - 0 61 Thousand/µL Final    Basophils Absolute 0 03  0 00 - 0 10 Thousands/µL Final   COMPREHENSIVE METABOLIC PANEL - Abnormal    Sodium 141  136 - 145 mmol/L Final    Potassium 3 9  3 5 - 5 3 mmol/L Final    Chloride 101  100 - 108 mmol/L Final    CO2 30  21 - 32 mmol/L Final    ANION GAP 10  4 - 13 mmol/L Final    BUN 54 (*) 5 - 25 mg/dL Final    Creatinine 2 66 (*) 0 60 - 1 30 mg/dL Final    Comment: Standardized to IDMS reference method    Glucose 133  65 - 140 mg/dL Final    Comment: If the patient is fasting, the ADA then defines impaired fasting glucose as > 100 mg/dL and diabetes as > or equal to 123 mg/dL  Specimen collection should occur prior to Sulfasalazine administration due to the potential for falsely depressed results  Specimen collection should occur prior to Sulfapyridine administration due to the potential for falsely elevated results  Calcium 8 6  8 3 - 10 1 mg/dL Final    AST 29  5 - 45 U/L Final    Comment: Specimen collection should occur prior to Sulfasalazine administration due to the potential for falsely depressed results  ALT 31  12 - 78 U/L Final    Comment: Specimen collection should occur prior to Sulfasalazine administration due to the potential for falsely depressed results  Alkaline Phosphatase 94  46 - 116 U/L Final    Total Protein 7 6  6 4 - 8 2 g/dL Final    Albumin 3 9  3 5 - 5 0 g/dL Final    Total Bilirubin 0 26  0 20 - 1 00 mg/dL Final    Comment: Use of this assay is not recommended for patients undergoing treatment with eltrombopag due to the potential for falsely elevated results      eGFR 17  ml/min/1 73sq m Final    Narrative:     [place] guidelines for Chronic Kidney Disease (CKD):     Stage 1 with normal or high GFR (GFR > 90 mL/min/1 73 square meters)    Stage 2 Mild CKD (GFR = 60-89 mL/min/1 73 square meters)    Stage 3A Moderate CKD (GFR = 45-59 mL/min/1 73 square meters)    Stage 3B Moderate CKD (GFR = 30-44 mL/min/1 73 square meters)    Stage 4 Severe CKD (GFR = 15-29 mL/min/1 73 square meters)    Stage 5 End Stage CKD (GFR <15 mL/min/1 73 square meters)  Note: GFR calculation is accurate only with a steady state creatinine   LIPASE - Abnormal    Lipase 35 (*) 73 - 393 u/L Final   URINE MICROSCOPIC - Abnormal    RBC, UA None Seen  None Seen, 0-1, 1-2, 2-4, 0-5 /hpf Final    WBC, UA 4-10 (*) None Seen, 0-1, 1-2, 0-5, 2-4 /hpf Final    Epithelial Cells Occasional  None Seen, Occasional /hpf Final    Bacteria, UA Moderate (*) None Seen, Occasional /hpf Final   URINE MACROSCOPIC, POC - Abnormal    Color, UA Yellow   Final    Clarity, UA Clear   Final    pH, UA 5 5  4 5 - 8 0 Final    Leukocytes, UA Small (*) Negative Final    Nitrite, UA Negative  Negative Final    Protein,  (2+) (*) Negative mg/dl Final    Glucose, UA Negative  Negative mg/dl Final    Ketones, UA Negative  Negative mg/dl Final    Urobilinogen, UA 0 2  0 2, 1 0 E U /dl E U /dl Final    Bilirubin, UA Negative  Negative Final    Blood, UA Negative  Negative Final    Specific [place], UA 1 015  1 003 - 1 030 Final    Narrative:     CLINITEK RESULT   PROTIME-INR - Normal    Protime 12 6  11 6 - 14 5 seconds Final    INR 0 94  0 84 - 1 19 Final   APTT - Normal    PTT 26  23 - 37 seconds Final    Comment: Therapeutic Heparin Range =  60-90 seconds   LACTIC ACID, PLASMA - Normal    LACTIC ACID 1 7  0 5 - 2 0 mmol/L Final    Narrative:     Result may be elevated if tourniquet was used during collection  BLOOD CULTURE   BLOOD CULTURE   URINE CULTURE       All labs reviewed and utilized in the medical decision making process    Radiology results:    XR chest 1 view portable   ED Interpretation   No obvious infiltrate  No acute cardiopulmonary disease          All radiology studies independently viewed by me and interpreted by the radiologist       PROCEDURES     ECG 12 Lead Documentation Only    Date/Time: 5/17/2021 6:10 AM  Performed by: Akil Painter PA-C  Authorized by:  Akil Painter PA-C     Indications / Diagnosis:  Sob  ECG reviewed by me, the ED Provider: yes    Patient location:  ED  Previous ECG:     Previous ECG:  Compared to current    Comparison ECG info:  Nonspecific st-tchanges    Similarity:  Changes noted    Comparison to cardiac monitor: Yes    Interpretation:     Interpretation: non-specific    Rate:     ECG rate assessment: normal    Rhythm:     Rhythm: sinus rhythm and A-V block    Ectopy:     Ectopy: none    QRS:     QRS axis:  Normal    QRS intervals:  Normal  Conduction:     Conduction: abnormal      Abnormal conduction: 1st degree    ST segments:     ST segments:  Normal  T waves:     T waves: non-specific            Invasive Devices     Peripheral Intravenous Line            Peripheral IV 05/17/21 Right Antecubital less than 1 day                ASSESSMENT AND PLAN     MDM  Number of Diagnoses or Management Options  Acute-on-chronic kidney injury ([facility] [address] ): new, needed workup  Gastroesophageal reflux disease without esophagitis: new, needed workup  Pneumonitis: new, needed workup     Amount and/or Complexity of Data Reviewed  Clinical lab tests: ordered and reviewed  Tests in the radiology section of CPT®: ordered and reviewed  Tests in the medicine section of CPT®: ordered and reviewed  Obtain history from someone other than the patient: yes  Review and summarize past medical records: yes  Discuss the patient with other providers: yes  Independent visualization of images, tracings, or specimens: yes    Risk of Complications, Morbidity, and/or Mortality  Presenting problems: high  Diagnostic procedures: high  Management options: high    Patient Progress  Patient progress: improved      Initial ED assessment:  Lucille Segal is a 66 y o  female with significant PMH for Gastroparesis, T2DM, CKD, GERD who brought to ED via EMS for SOB, Cough, Aspiration  Vitals signs reviewed and WNL   Physical examination is remarkable for wheeze rhonchi, intermittent cough, distended abdomen, mild epigastric ttp    Initial Ddx  includes but is not limited to:    URI, bronchitis, pneumonia, GERD, aspiration pneumonitis, viral illness, smoke inhalation, CO poisoning, adverse medication reaction  Initial ED plan:   Plan will be to perform diagnostic testing of Blood labs, Urinalysis, EKG and XR of chest and treat symptomatically   Final ED summary/disposition: ADMIT MEDICINE: Discussed course of care with Patient and Family with Permission who is agreeable to admission for further eval, treatment  Called inpatient   [facility] Hospitalist who is aware of the patient, case discussed including HPI, pertinent PMH, ED Course, and workup   Hospitalist agreed with plan and will accept for admission/observation under the medicine service    MDM  Reviewed: previous chart, nursing note and vitals  Reviewed previous: labs, ECG and x-ray  Interpretation: labs, ECG and x-ray          ED COURSE OF CARE AND REASSESSMENT     ED Course as of May 17 0629   Mon May 17, 2021   0508 Bacteria, UA(!): Moderate   0508 WBC, UA(!): 4-10                                       Medications   sodium chloride 0 9 % bolus 250 mL (250 mL Intravenous New Bag 5/17/21 0532)   pantoprazole (PROTONIX) injection 40 mg (40 mg Intravenous Given 5/17/21 0438)   aluminum-magnesium hydroxide-simethicone (MYLANTA) oral suspension 30 mL (30 mL Oral Given 5/17/21 0445)   Lidocaine Viscous HCl (XYLOCAINE) 2 % mucosal solution 15 mL (15 mL Swish & Swallow Given 5/17/21 0445)   albuterol inhalation solution 5 mg (5 mg Nebulization Given 5/17/21 0415)   ipratropium (ATROVENT) 0 02 % inhalation solution 0 5 mg (0 5 mg Nebulization Given 5/17/21 0415)   ceftriaxone (ROCEPHIN) 1 g/50 mL in dextrose IVPB (0 mg Intravenous Stopped 5/17/21 0532)   metroNIDAZOLE (FLAGYL) tablet 500 mg (500 mg Oral Given 5/17/21 0445)   ondansetron (ZOFRAN) injection 4 mg (4 mg Intravenous Given 5/17/21 0438)         FINAL IMPRESSION     Final diagnoses:   Pneumonitis - concern for developing aspiration pneumonia   Gastroesophageal reflux disease without esophagitis   Acute-on-chronic kidney injury ([facility] [address] )         DISPOSITION AND PLANNING     Time reflects when diagnosis was documented in both MDM as applicable and the Disposition within this note     Time User Action Codes Description Comment    5/17/2021  4:51 AM Peter Salt Add [J18 9] Pneumonitis     5/17/2021  4:51 AM Estefany EssexTrino Add [K21 9] Gastroesophageal reflux disease without esophagitis     5/17/2021  4:51 AM Peter Salt Modify [J18 9] Pneumonitis concern for developing aspiration pneumonia    5/17/2021  4:51 AM Estefany Essex, Trino Add [N17 9,  N18 9] Acute-on-chronic kidney injury ([facility] [address] )     5/17/2021  4:51 AM Peter Salt Modify [J18 9] Pneumonitis concern for developing aspiration pneumonia    5/17/2021  4:51 AM Peter Salt Modify [N17 9,  N18 9] Acute-on-chronic kidney injury [facility])       ED Disposition     ED Disposition Condition Date/Time Comment    Admit Stable Mon May 17, 2021  5:12 AM Case was discussed with Klaudia Reilly and the patient's admission status was agreed to be Admission Status: observation status to the service of Dr Carloz Saenz  Follow-up Information    None             PATIENT REFERRAL     No follow-up provider specified  DISCHARGE MEDICATIONS     Current Discharge Medication List      CONTINUE these medications which have NOT CHANGED    Details   acetaminophen (TYLENOL) 500 mg tablet Take 1,000 mg by mouth as needed       albuterol (Ventolin HFA) 90 mcg/act inhaler Inhale 2 puffs every 6 (six) hours as needed for wheezing or shortness of breath  Refills: 0    Comments: Substitution to a formulary equivalent within the same pharmaceutical class is authorized    Associated Diagnoses: Acute respiratory failure with hypoxia (HCC)      amLODIPine (NORVASC) 5 mg tablet TAKE 1 TABLET BY MOUTH EVERY DAY  Qty: 90 tablet, Refills: 3    Associated Diagnoses: Hypertensive chronic kidney disease with stage 1 through stage 4 chronic kidney disease, or unspecified chronic kidney disease; Essential hypertension; Chronic kidney disease, stage III (moderate) ([facility])      b complex vitamins tablet Take 1 tablet by mouth daily       B-D ULTRAFINE III SHORT PEN 31G X 8 MM MISC USE AS DIRECTED 4 TIMES PER DAY  Refills: 3      doxazosin (CARDURA) 2 mg tablet TAKE 1 TABLET (2 MG TOTAL) BY MOUTH DAILY AT BEDTIME  Qty: 90 tablet, Refills: 1    Associated Diagnoses: Hypertensive chronic kidney disease with stage 1 through stage 4 chronic kidney disease, or unspecified chronic kidney disease      !! DULoxetine (Cymbalta) 30 mg delayed release capsule Take 30 mg by mouth daily      !! DULoxetine (CYMBALTA) 60 mg delayed release capsule Take 90 mg by mouth daily      famotidine (PEPCID) 40 MG tablet Take 1 tablet (40 mg total) by mouth daily Take daily in am  Qty: 90 tablet, Refills: 3    Associated Diagnoses: Gastroesophageal reflux disease, unspecified whether esophagitis present      fluticasone (FLONASE) 50 mcg/act nasal spray 1-2 sprays daily      gabapentin (NEURONTIN) 300 mg capsule Take 300 mg by mouth 2 (two) times a day       Icosapent Ethyl (Vascepa) 1 g CAPS Take 1 g by mouth 2 (two) times a day      insulin aspart (NovoLOG) 100 units/mL injection Inject under the skin 3 (three) times a day before meals 14 units, 14 units, 18 units        insulin detemir (LEVEMIR) 100 units/mL subcutaneous injection Inject 32 Units under the skin daily at bedtime  Refills: 0    Associated Diagnoses: Diabetic nephropathy associated with type 2 diabetes mellitus (HCC)      levothyroxine 137 mcg tablet Take 137 mcg by mouth      metoprolol tartrate (LOPRESSOR) 50 mg tablet Take 1 tablet (50 mg total) by mouth every 12 (twelve) hours  Qty: 180 tablet, Refills: 3    Associated Diagnoses: Essential hypertension      olmesartan (BENICAR) 40 mg tablet Take 1 tablet (40 mg total) by mouth daily  Qty: 90 tablet, Refills: 0    Associated Diagnoses: Benign hypertension with CKD (chronic kidney disease) stage III (HCC) pantoprazole (PROTONIX) 20 mg tablet Take 1 tablet (20 mg total) by mouth daily    Associated Diagnoses: GERD (gastroesophageal reflux disease)      pramipexole (MIRAPEX) 0 25 mg tablet Take 1 tablet (0 25 mg total) by mouth daily  Qty: 90 tablet, Refills: 3    Associated Diagnoses: RLS (restless legs syndrome)      rosuvastatin (CRESTOR) 5 mg tablet TAKE 1 TABLET BY MOUTH EVERY DAY AT NIGHT      torsemide (DEMADEX) 20 mg tablet Take 1 tablet (20 mg total) by mouth daily  Qty: 90 tablet, Refills: 3    Associated Diagnoses: Hypertensive chronic kidney disease with stage 1 through stage 4 chronic kidney disease, or unspecified chronic kidney disease; CKD (chronic kidney disease) stage 3, GFR 30-59 ml/min ([facility])      Vitamin D, Ergocalciferol, 2000 units CAPS Take 2,000 Units by mouth daily        ! ! - Potential duplicate medications found  Please discuss with provider  No discharge procedures on file      PDMP Review       Value Time User    PDMP Reviewed  Yes 5/17/2021  3:42 AM MD Alexandre ChappellrMILTONrMILTON  05/17/21 [id number]

## 2021-05-17 NOTE — Clinical Note
Case was discussed with and the patient's admission status was agreed to be Admission Status: observation status to the service of

## 2021-05-17 NOTE — H&P
[address] 1950, 66 y o  female MRN: [id number]  Unit/Bed#: S -01 Encounter: [id number]  Primary Care Provider: Theodore Wall MD   Date and time admitted to hospital: 5/17/2021  3:34 AM    Epigastric abdominal pain  Assessment & Plan  This is a 69-year-old female with a history of gastroparesis and GERD who presents with epigastric pain and abdominal distention which started overnight  Reports associated cough worse on lying down, likely due to reflux  No -bloody stool, -diarrhea, nausea/vomiting  In ED was given Mylanta which improved patient's discomfort  Pertinent labs:  Lipase decreased at 35  Pertinent imaging findings:  CXR - does not appear to have any infiltrates though official read is pending  · Make NPO until evaluated by GI  Will likely need to start with small frequent meals given history of gastroparesis  · GI consult  · Continue home Protonix, will add Mylanta  · IVF hydration  · FOBT    Cough  Assessment & Plan  secondary to acid reflux vs aspiration pneumonitis  CXR does not appear to reveal any pulmonary infiltrates on my review though official read is pending  She remains afebrile and SpO2 at 97 percent on room air  In ED was given a dose of ceftriaxone  - follow-up with official CXR read  - supportive care  - monitor temp, SpO2   - see above for rest of plan    Type 2 diabetes mellitus with diabetic nephropathy, with long-term current use of insulin (HCC)  Assessment & Plan  Lab Results   Component Value Date    HGBA1C 7 3 (H) 01/04/2021       No results for input(s): POCGLU in the last 72 hours  Blood Sugar Average: Last 72 hrs:     Home insulin regimen:  NovoLog 14 units t i d  With meals, Levemir 32 units q h s  · Will hold home insulin regimen and start on sliding scale insulin give patient will be NPO for now    · Monitor blood glucose  · Hypoglycemia protocol    Acute kidney injury superimposed on chronic kidney disease [facility])  Assessment & Plan  Lab Results   Component Value Date    EGFR 17 05/17/2021    EGFR 36 07/15/2020    EGFR 36 07/14/2020    CREATININE 2 66 (H) 05/17/2021    CREATININE 1 47 (H) 07/15/2020    CREATININE 1 46 (H) 07/14/2020     Baseline creatinine ranges from 1 5-1 9, documented to have CKD stage 3b  POA creatinine 2 66, EGFR 17 which puts her at CKD stage 4  Reports she last saw her nephrologist 2 weeks ago  · IV fluids at maintenance  · Trend creatinine  · Hold nephrotoxic agents and avoid hypotension    RLS (restless legs syndrome)  Assessment & Plan  He continue pramipexole    BARBARA on CPAP  Assessment & Plan  Continue CPAP at night    CKD (chronic kidney disease) stage 3, GFR 30-59 ml/min ([facility])-resolved as of 5/17/2021  Assessment & Plan  Lab Results   Component Value Date    EGFR 17 05/17/2021    EGFR 36 07/15/2020    EGFR 36 07/14/2020    CREATININE 2 66 (H) 05/17/2021    CREATININE 1 47 (H) 07/15/2020    CREATININE 1 46 (H) 07/14/2020     VTE Prophylaxis: Heparin  / sequential compression device   Code Status: Level 3   POLST: POLST form is not discussed and not completed at this time  Anticipated Length of Stay:  Patient will be admitted on an Observation basis with an anticipated length of stay of  < 2 midnights  Justification for Hospital Stay:  [place] [id number] with decreased p o  Intake    Chief Complaint:   Epigastric pain    History of Present Illness:    Schuyler Vargas is a 66 y o  female with past medical history of diabetes , gastroparesis, COPD, CKD stage IIIB, and squamous cell carcinoma status post excision (recently) who presents with epigastric pain that began overnight  She reports that last Thursday she underwent he an EGD for Botox injection at the pylorus can attempt to help with her gastroparesis  She subsequently felt better until last night    She states that last night her  and her shared some chocolate and then overnight she began to notice epigastric pain   She also began to cough that was initially productive of mucus but is now a dry cough  Cough is worse with lying down  She denies diarrhea, nausea/vomiting  In ED patient was treated with Mylanta which she reports helped somewhat  Review of Systems:    Review of Systems   Constitutional: Negative for chills, diaphoresis and fever  HENT: Negative for sore throat  Respiratory: Positive for cough  Negative for shortness of breath  Cardiovascular: Negative for chest pain  Gastrointestinal: Positive for abdominal distention and abdominal pain  Negative for blood in stool, diarrhea and vomiting  Genitourinary: Negative for dysuria and flank pain  Skin: Negative for rash  All other systems reviewed and are negative  Past Medical and Surgical History:     Past Medical History:   Diagnosis Date    Allergic     Anxiety     Arthritis     Aspiration into airway     Basal cell carcinoma 2007    left cheek     Chronic kidney disease 2000, 2018    Stones, kidney disease stage 3    Diabetes mellitus ([facility] [address] )     Disease of thyroid gland     Family history of thyroid problem     GERD (gastroesophageal reflux disease)     Heart burn     Hyperplasia, parathyroid ([facility] [address] [facility]     Hypertension     Kidney problem     Obesity (BMI 30 0-34  9)     Pneumonia     RLS (restless legs syndrome)     Seasonal allergies     Sleep apnea     uses CPAP    Squamous cell skin cancer 2007    left cheek     Swollen ankles        Past Surgical History:   Procedure Laterality Date    ARTHROSCOPY KNEE      BREAST BIOPSY      stereotactic-benign    BREAST BIOPSY      stereo-benign    BREAST EXCISIONAL BIOPSY      unknown date-benign    BREAST EXCISIONAL BIOPSY      unknown date-benign    BREAST EXCISIONAL BIOPSY      unknown date-benign    BREAST EXCISIONAL BIOPSY      unknown age-benign    CHOLECYSTECTOMY      HIATAL HERNIA REPAIR      LIPOSUCTION      REDUCTION MAMMAPLASTY Bilateral 2000    SKIN CANCER EXCISION  2007    squamous cell carcinoma     SKIN CANCER EXCISION  2007    basal cell carcinoma    SQUAMOUS CELL CARCINOMA EXCISION      TOE SURGERY      TONSILLECTOMY      TUBAL LIGATION         Meds/Allergies:    Prior to Admission medications    Medication Sig Start Date End Date Taking? Authorizing Provider   acetaminophen (TYLENOL) 500 mg tablet Take 1,000 mg by mouth as needed     Historical Provider, MD   albuterol (Ventolin HFA) 90 mcg/act inhaler Inhale 2 puffs every 6 (six) hours as needed for wheezing or shortness of breath 5/5/20   Rufina Peace PA-C   amLODIPine (NORVASC) 5 mg tablet TAKE 1 TABLET BY MOUTH EVERY DAY 3/29/21   Rachele Darling MD   b complex vitamins tablet Take 1 tablet by mouth daily     Historical Provider, MD CARREON ULTRAFINE III SHORT PEN 31G X 8 MM MISC USE AS DIRECTED 4 TIMES PER DAY 7/26/19   Historical Provider, MD   doxazosin (CARDURA) 2 mg tablet TAKE 1 TABLET (2 MG TOTAL) BY MOUTH DAILY AT BEDTIME 2/6/21   [facility], MILTON   DULoxetine (Cymbalta) 30 mg delayed release capsule Take 30 mg by mouth daily 3/2/21 3/2/22  Historical Provider, MD   DULoxetine (CYMBALTA) 60 mg delayed release capsule Take 90 mg by mouth daily    Historical Provider, MD   famotidine (PEPCID) 40 MG tablet Take 1 tablet (40 mg total) by mouth daily Take daily in am 4/2/21   TC Kay   fluticasone (FLONASE) 50 mcg/act nasal spray 1-2 sprays daily 7/1/20   Historical Provider, MD   gabapentin (NEURONTIN) 300 mg capsule Take 300 mg by mouth 2 (two) times a day  6/17/20   Historical Provider, MD   Icosapent Ethyl (Vascepa) 1 g CAPS Take 1 g by mouth 2 (two) times a day    Historical Provider, MD   insulin aspart (NovoLOG) 100 units/mL injection Inject under the skin 3 (three) times a day before meals 14 units, 14 units, 18 units      Historical Provider, MD   insulin detemir (LEVEMIR) 100 units/mL subcutaneous injection Inject 32 Units under the skin daily at bedtime  Patient taking differently: Inject 40 Units under the skin daily at bedtime  20   Vanda Chappell MD   levothyroxine 137 mcg tablet Take 137 mcg by mouth 3/2/21 3/2/22  Historical Provider, MD   metoprolol tartrate (LOPRESSOR) 50 mg tablet Take 1 tablet (50 mg total) by mouth every 12 (twelve) hours 21   Beatris Hatchet, MD   olmesartan (BENICAR) 40 mg tablet Take 1 tablet (40 mg total) by mouth daily 21   [facility], MILTON   pantoprazole (PROTONIX) 20 mg tablet Take 1 tablet (20 mg total) by mouth daily 21   Vanda Chappell MD   pramipexole (MIRAPEX) 0 25 mg tablet Take 1 tablet (0 25 mg total) by mouth daily 20   Milton Turner DO   rosuvastatin (CRESTOR) 5 mg tablet TAKE 1 TABLET BY MOUTH EVERY DAY AT NIGHT 10/13/20   Historical Provider, MD   torsemide (DEMADEX) 20 mg tablet Take 1 tablet (20 mg total) by mouth daily 20   Beatris Hatchet, MD   Vitamin D, Ergocalciferol, 2000 units CAPS Take 2,000 Units by mouth daily     Historical Provider, MD     I have reviewed home medications with patient personally  Allergies:    Allergies   Allergen Reactions    Pollen Extract     Ciprofloxacin Hives and Itching       Social History:     Marital Status: /Civil Union   Occupation:  Retired  Patient Pre-hospital Living Situation:  At home with  and son  Patient Pre-hospital Level of Mobility:  Independent  Patient Pre-hospital Diet Restrictions:  Low-sodium  Substance Use History:   Social History     Substance and Sexual Activity   Alcohol Use Yes    Alcohol/week: 0 0 standard drinks    Frequency: Monthly or less    Drinks per session: 1 or 2    Binge frequency: Never    Comment: 1 or 2 a year     Social History     Tobacco Use   Smoking Status Former Smoker    Packs/day: 2 00    Years: 10 00    Pack years: 20 00    Types: Cigarettes    Start date: 1967   Anne-Marie Reeder Quit date: 12    Years since quittin 4   Smokeless Tobacco Never Used     Social History     Substance and Sexual Activity   Drug Use No       Family History:    Family History   Problem Relation Age of Onset    Heart disease Mother     Depression Mother     Hypertension Mother     COPD Mother     Hearing loss Mother     Heart disease Father     Lung cancer Father 79        Smoker     Cancer Father         brain    Alcohol abuse Father     Dementia Father     Hypertension Father     Thyroid disease Father     COPD Father     Arthritis Father     Brain cancer Father 76    Hypertension Sister     Diabetes Sister     Heart disease Sister     Thyroid disease Sister     Cancer Sister         Lympoma    Lung cancer Sister 78    Hypertension Brother     Diabetes Brother     Cancer Brother         Throat    Dementia Brother     Stroke Brother     Hypertension Brother     No Known Problems Son     No Known Problems Son     Heart disease Brother     Diabetes Brother     Brain cancer Paternal Aunt         unknown age       Physical Exam:     Vitals:   Blood Pressure: 118/50 (05/17/21 0618)  Pulse: 65 (05/17/21 0618)  Temperature: 97 7 °F (36 5 °C) (05/17/21 0618)  Temp Source: Oral (05/17/21 0337)  Respirations: 18 (05/17/21 0618)  Height: 5' 3" (160 cm) (05/17/21 0337)  Weight - Scale: 79 kg (174 lb 2 6 oz) (05/17/21 0337)  SpO2: 97 % (05/17/21 0618)    Physical Exam  Vitals signs and nursing note reviewed  Constitutional:       General: She is not in acute distress  Appearance: She is ill-appearing (Chronically)  She is not toxic-appearing or diaphoretic  HENT:      Nose: Nose normal       Mouth/Throat:      Mouth: Mucous membranes are moist    Eyes:      Extraocular Movements: Extraocular movements intact  Pupils: Pupils are equal, round, and reactive to light  Cardiovascular:      Rate and Rhythm: Normal rate and regular rhythm  Pulses: Normal pulses  Heart sounds: Normal heart sounds     Pulmonary:      Effort: Pulmonary effort is normal       Breath sounds: Normal breath sounds  No wheezing, rhonchi or rales  Abdominal:      General: Bowel sounds are normal  There is distension  Palpations: Abdomen is soft  There is no mass  Tenderness: There is no abdominal tenderness  There is no guarding  Hernia: No hernia is present  Musculoskeletal:      Right lower leg: No edema  Left lower leg: No edema  Skin:     General: Skin is warm and dry  Capillary Refill: Capillary refill takes less than 2 seconds  Neurological:      Mental Status: She is alert and oriented to person, place, and time  Additional Data:     Lab Results: I have personally reviewed pertinent reports  Results from last 7 days   Lab Units 05/17/21  0411   WBC Thousand/uL 7 70   HEMOGLOBIN g/dL 12 1   HEMATOCRIT % 38 8   PLATELETS Thousands/uL 259   NEUTROS PCT % 64   LYMPHS PCT % 25   MONOS PCT % 7   EOS PCT % 4     Results from last 7 days   Lab Units 05/17/21  0411   POTASSIUM mmol/L 3 9   CHLORIDE mmol/L 101   CO2 mmol/L 30   BUN mg/dL 54*   CREATININE mg/dL 2 66*   CALCIUM mg/dL 8 6   ALK PHOS U/L 94   ALT U/L 31   AST U/L 29     Results from last 7 days   Lab Units 05/17/21  0414   INR  0 94       Imaging: I have personally reviewed pertinent reports  Mammo Screening Bilateral W 3d & Cad    Result Date: 5/6/2021  Narrative: DIAGNOSIS: Visit for screening mammogram TECHNIQUE: Digital screening mammography was performed  Computer Aided Detection (CAD) analyzed all applicable images  COMPARISONS: Prior breast imaging dated: 04/29/2019, 04/25/2018, 02/21/2017, 02/09/2016, and 02/05/2015 RELEVANT HISTORY: Family Breast Cancer History: No known family history of breast cancer  Family Medical History: No known relevant family medical history  Personal History: Hormone history includes birth control and other  Surgical history includes breast biopsy and breast reduction  No known relevant medical history   The patient is scheduled in a reminder system for screening mammography  8-10% of cancers will be missed on mammography  Management of a palpable abnormality must be based on clinical grounds  Patients will be notified of their results via letter from our facility  Accredited by [employer] of [employer] and FDA  RISK ASSESSMENT: 5 Year Tyrer-Cuzick: 0 84 % 10 Year Tyrer-Cuzick: 1 79 % Lifetime Tyrer-Cuzick: 2 41 % TISSUE DENSITY: The breasts are heterogeneously dense, which may obscure small masses  INDICATION: Hever Sequeira is a 66 y o  female presenting for screening mammography  FINDINGS: BILATERAL 1) CALCIFICATIONS: There are round and rim calcifications in a diffuse distribution seen in both breasts  These are benign and unchanged  Breast parenchymal distributions are unchanged from priors including multiple bilateral nodular focal asymmetries  Stability since at least 2016 confirms benignancy  No developing asymmetries or concerning masses  No suspicious microcalcifications, architectural distortion, skin thickening, or abnormalities of the nipples in either breast      Impression: No mammographic evidence of malignancy or significant change from priors  ASSESSMENT/BI-RADS CATEGORY: Left: 2 - Benign Right: 2 - Benign Overall: 2 - Benign RECOMMENDATION:      - Routine screening mammogram in 1 year for both breasts  Workstation ID: [id number]       EKG, Pathology, and Other Studies Reviewed on Admission:   · EKG:  Sinus rhythm, first-degree AV block    Epic / Care Everywhere Records Reviewed: Yes     ** Please Note: This note has been constructed using a voice recognition system   **

## 2021-05-17 NOTE — CONSULTS
Consultation - [facility]  Yves Severin 66 y o  female MRN: [id number]  Unit/Bed#: S -01 Encounter: [id number]         Assessment/Plan:   1  Epigastric pain  Associated with nausea vomiting, acid reflux, abdominal distension started the day of admission couple of hours after she had couple of chocolate bars  Otherwise patient was compliant with eating small portions  Plan:  · Would consider starting Reglan low-dose before meals, patient is agreeable  Side effects were discussed with patient and she expressed understanding  EKG done on admission showed normal QTc  · Continue PPI and Pepcid  · Advanced diet as tolerable, small portions  · Tight glycemic control  · Continue Mylanta     2  Gastroparesis status post EGD with Botox on 05/13/2021  · Tight glycemic control  · Consider initiating regular, discussed side effects  EKG done on admission showed normal QTC    3  GERD without esophagitis  · Continue H2 blocker and PPI    4  Hiatal hernia post repair 5 years    Will discuss feel the above plan with my attending and communicate recommendations to primary team accordingly    Reason for Consult / Principal Problem:  Gastroparesis    HPI:   Yves Severin is a 66y o  year old female with past medical history significant for GERD without esophagitis, history of repair hiatal hernia, gastroparesis, pulmonary hypertension, BARBARA on CPAP, diabetes, hypertension, CKD, hyperlipidemia who presents with epigastric pain, abdominal distention, nausea, vomiting and acid reflux and cough started the day of admission couple of hours after she had couple of chocolate bars      Patient underwent EGD in August 2020 which was significant for is 0 ft chilled diverticulum with small amount of fluid along with retained food in the stomach, she also underwent gastric emptying study in October 2020 which was positive for significant delay in the emptying at 4 hours and she only noted to have 3% emptying noted at 4 hours  Subsequently she underwent EGD with Botox on 05/13/2021 with initial improvement of her symptoms  Patient was doing well after the Botox procedure until the day of admission  She has been compliant with eating in small portions and avoiding fatty, acidic and spicy food  Patient was heme a dynamically stable admission, noted to have acute kidney injury  Lipase was normal, liver enzymes and total bili were within normal range  Patient was noted to be on 2 L O2 supplements nasal cannula as there is concern for aspiration after she vomited and from her reflux  Chest x-ray was unremarkable  And GI service was consulted for further recommendations regarding epigastric pain  Last EGD: 5/13/2021:  EGD botox:   1  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  2  Small sliding hiatal hernia  3  Large food bolus noted in the gastric lumen  4  Pylorus was patent, pylorus was injected with 200 units of Botox for severe gastroparesis      Last Colonoscopy: 4-5 years ago [place], no records available  Review of Systems:    Review of Systems   Constitutional: Negative for activity change, appetite change, chills, diaphoresis and fever  HENT: Negative for congestion and trouble swallowing  Respiratory: Positive for cough and shortness of breath  Cardiovascular: Negative for chest pain, palpitations and leg swelling  Gastrointestinal: Positive for abdominal distention, abdominal pain (Left upper quadrant), nausea and vomiting  Negative for diarrhea  Genitourinary: Negative for difficulty urinating and dysuria  Musculoskeletal: Negative for arthralgias and back pain  Skin: Negative for color change and rash  Neurological: Negative for dizziness and light-headedness  Psychiatric/Behavioral: Negative for confusion  The patient is not nervous/anxious  All other systems reviewed and are negative        Historical Information   Past Medical History:   Diagnosis Date  Allergic     Anxiety     Arthritis     Aspiration into airway     Basal cell carcinoma 2007    left cheek     Chronic kidney disease , 2018    Stones, kidney disease stage 3    Diabetes mellitus ([facility] [address] [facility]     Disease of thyroid gland     Family history of thyroid problem     GERD (gastroesophageal reflux disease)     Heart burn     Hyperplasia, parathyroid ([facility] [address] [facility]     Hypertension     Kidney problem     Obesity (BMI 30 0-34  9)     Pneumonia     RLS (restless legs syndrome)     Seasonal allergies     Sleep apnea     uses CPAP    Squamous cell skin cancer 2007    left cheek     Swollen ankles      Past Surgical History:   Procedure Laterality Date    ARTHROSCOPY KNEE      BREAST BIOPSY      stereotactic-benign    BREAST BIOPSY      stereo-benign    BREAST EXCISIONAL BIOPSY      unknown date-benign    BREAST EXCISIONAL BIOPSY      unknown date-benign    BREAST EXCISIONAL BIOPSY      unknown date-benign    BREAST EXCISIONAL BIOPSY      unknown age-benign    CHOLECYSTECTOMY      HIATAL HERNIA REPAIR      LIPOSUCTION      REDUCTION MAMMAPLASTY Bilateral 2000    SKIN CANCER EXCISION  2007    squamous cell carcinoma     SKIN CANCER EXCISION  2007    basal cell carcinoma    SQUAMOUS CELL CARCINOMA EXCISION      TOE SURGERY      TONSILLECTOMY      TUBAL LIGATION       Social History   Social History     Substance and Sexual Activity   Alcohol Use Yes    Alcohol/week: 0 0 standard drinks    Frequency: Monthly or less    Drinks per session: 1 or 2    Binge frequency: Never    Comment: 1 or 2 a year     Social History     Substance and Sexual Activity   Drug Use No     Social History     Tobacco Use   Smoking Status Former Smoker    Packs/day: 2 00    Years: 10 00    Pack years: 20 00    Types: Cigarettes    Start date: 1967   [place] Quit date: 12    Years since quittin 4   Smokeless Tobacco Never Used     Family History   Problem Relation Age of Onset    Heart disease Mother     Depression Mother     Hypertension Mother     COPD Mother     Hearing loss Mother     Heart disease Father     Lung cancer Father 79        Smoker     Cancer Father         brain    Alcohol abuse Father     Dementia Father     Hypertension Father     Thyroid disease Father     COPD Father     Arthritis Father     Brain cancer Father 76    Hypertension Sister     Diabetes Sister     Heart disease Sister     Thyroid disease Sister     Cancer Sister         Lympoma    Lung cancer Sister 78    Hypertension Brother     Diabetes Brother     Cancer Brother         Throat    Dementia Brother     Stroke Brother     Hypertension Brother     No Known Problems Son     No Known Problems Son     Heart disease Brother     Diabetes Brother     Brain cancer Paternal Aunt         unknown age        Meds/Allergies     Current Facility-Administered Medications   Medication Dose Route Frequency    albuterol (PROVENTIL HFA,VENTOLIN HFA) inhaler 2 puff  2 puff Inhalation Q6H PRN    aluminum-magnesium hydroxide-simethicone (MYLANTA) oral suspension 30 mL  30 mL Oral Q4H PRN    amLODIPine (NORVASC) tablet 5 mg  5 mg Oral Daily    cholecalciferol (VITAMIN D3) tablet 1,000 Units  1,000 Units Oral BID    doxazosin (CARDURA) tablet 2 mg  2 mg Oral HS    DULoxetine (CYMBALTA) delayed release capsule 90 mg  90 mg Oral Daily    fish oil capsule 1,000 mg  1,000 mg Oral BID    gabapentin (NEURONTIN) capsule 300 mg  300 mg Oral BID    heparin (porcine) subcutaneous injection 5,000 Units  5,000 Units Subcutaneous Q8H [address]    insulin lispro (HumaLOG) 100 units/mL subcutaneous injection 1-5 Units  1-5 Units Subcutaneous HS    insulin lispro (HumaLOG) 100 units/mL subcutaneous injection 1-6 Units  1-6 Units Subcutaneous TID AC    levothyroxine tablet 137 mcg  137 mcg Oral Early Morning    metoprolol tartrate (LOPRESSOR) tablet 50 mg  50 mg Oral Q12H [address]    multivitamin stress formula tablet 1 tablet 1 tablet Oral Daily    [START ON 5/18/2021] pantoprazole (PROTONIX) injection 40 mg  40 mg Intravenous Q24H RICHARD    pramipexole (MIRAPEX) tablet 0 25 mg  0 25 mg Oral Daily    pravastatin (PRAVACHOL) tablet 40 mg  40 mg Oral Daily With Dinner       Allergies   Allergen Reactions    Pollen Extract     Ciprofloxacin Hives and Itching         Objective     Blood pressure 118/50, pulse 65, temperature 97 7 °F (36 5 °C), resp  rate 18, height 5' 3" (1 6 m), weight 79 kg (174 lb 2 6 oz), SpO2 97 %  Intake/Output Summary (Last 24 hours) at 5/17/2021 0842  Last data filed at 5/17/2021 0532  Gross per 24 hour   Intake 50 ml   Output --   Net 50 ml         PHYSICAL EXAM:      Physical Exam  Vitals signs and nursing note reviewed  Constitutional:       General: She is not in acute distress  Appearance: She is not ill-appearing or diaphoretic  HENT:      Head: Normocephalic and atraumatic  Mouth/Throat:      Mouth: Mucous membranes are moist       Pharynx: Oropharynx is clear  Eyes:      General: No scleral icterus  Extraocular Movements: Extraocular movements intact  Conjunctiva/sclera: Conjunctivae normal    Neck:      Musculoskeletal: Normal range of motion and neck supple  Cardiovascular:      Rate and Rhythm: Normal rate and regular rhythm  Heart sounds: Normal heart sounds  No murmur  Pulmonary:      Effort: Pulmonary effort is normal  No respiratory distress  Breath sounds: Normal breath sounds  No wheezing or rales  Abdominal:      General: Bowel sounds are normal       Palpations: Abdomen is soft  Tenderness: There is no abdominal tenderness  Musculoskeletal: Normal range of motion  Right lower leg: No edema  Left lower leg: No edema  Skin:     General: Skin is warm and dry  Capillary Refill: Capillary refill takes less than 2 seconds  Neurological:      General: No focal deficit present        Mental Status: She is alert and oriented to person, place, and time  Psychiatric:         Mood and Affect: Mood normal          Behavior: Behavior normal            Lab Results:   Results from last 7 days   Lab Units 05/17/21  0411   WBC Thousand/uL 7 70   HEMOGLOBIN g/dL 12 1   HEMATOCRIT % 38 8   PLATELETS Thousands/uL 259   NEUTROS PCT % 64   LYMPHS PCT % 25   MONOS PCT % 7   EOS PCT % 4     Results from last 7 days   Lab Units 05/17/21  0411   POTASSIUM mmol/L 3 9   CHLORIDE mmol/L 101   CO2 mmol/L 30   BUN mg/dL 54*   CREATININE mg/dL 2 66*   CALCIUM mg/dL 8 6   ALK PHOS U/L 94   ALT U/L 31   AST U/L 29     Results from last 7 days   Lab Units 05/17/21  0414   INR  0 94     Results from last 7 days   Lab Units 05/17/21  0411   LIPASE u/L 35*       Imaging Studies: I have personally reviewed pertinent imaging studies  No results found

## 2021-05-18 VITALS
OXYGEN SATURATION: 93 % | DIASTOLIC BLOOD PRESSURE: 60 MMHG | TEMPERATURE: 97.9 F | WEIGHT: 174.16 LBS | HEIGHT: 63 IN | HEART RATE: 61 BPM | SYSTOLIC BLOOD PRESSURE: 116 MMHG | BODY MASS INDEX: 30.86 KG/M2 | RESPIRATION RATE: 16 BRPM

## 2021-05-18 LAB
ANION GAP SERPL CALCULATED.3IONS-SCNC: 9 MMOL/L (ref 4–13)
BUN SERPL-MCNC: 45 MG/DL (ref 5–25)
CALCIUM SERPL-MCNC: 8.3 MG/DL (ref 8.3–10.1)
CHLORIDE SERPL-SCNC: 104 MMOL/L (ref 100–108)
CO2 SERPL-SCNC: 28 MMOL/L (ref 21–32)
CREAT SERPL-MCNC: 1.92 MG/DL (ref 0.6–1.3)
GFR SERPL CREATININE-BSD FRML MDRD: 26 ML/MIN/1.73SQ M
GLUCOSE P FAST SERPL-MCNC: 159 MG/DL (ref 65–99)
GLUCOSE SERPL-MCNC: 148 MG/DL (ref 65–140)
GLUCOSE SERPL-MCNC: 159 MG/DL (ref 65–140)
GLUCOSE SERPL-MCNC: 171 MG/DL (ref 65–140)
PLATELET # BLD AUTO: 207 THOUSANDS/UL (ref 149–390)
PMV BLD AUTO: 10.7 FL (ref 8.9–12.7)
POTASSIUM SERPL-SCNC: 4.1 MMOL/L (ref 3.5–5.3)
SODIUM SERPL-SCNC: 141 MMOL/L (ref 136–145)

## 2021-05-18 PROCEDURE — 85049 AUTOMATED PLATELET COUNT: CPT | Performed by: FAMILY MEDICINE

## 2021-05-18 PROCEDURE — 82948 REAGENT STRIP/BLOOD GLUCOSE: CPT

## 2021-05-18 PROCEDURE — 80048 BASIC METABOLIC PNL TOTAL CA: CPT | Performed by: INTERNAL MEDICINE

## 2021-05-18 PROCEDURE — 99214 OFFICE O/P EST MOD 30 MIN: CPT | Performed by: INTERNAL MEDICINE

## 2021-05-18 PROCEDURE — 99217 PR OBSERVATION CARE DISCHARGE MANAGEMENT: CPT | Performed by: INTERNAL MEDICINE

## 2021-05-18 PROCEDURE — C9113 INJ PANTOPRAZOLE SODIUM, VIA: HCPCS | Performed by: INTERNAL MEDICINE

## 2021-05-18 RX ORDER — BISACODYL 10 MG
10 SUPPOSITORY, RECTAL RECTAL DAILY PRN
Status: DISCONTINUED | OUTPATIENT
Start: 2021-05-18 | End: 2021-05-18 | Stop reason: HOSPADM

## 2021-05-18 RX ORDER — POLYETHYLENE GLYCOL 3350 17 G/17G
17 POWDER, FOR SOLUTION ORAL DAILY
Status: DISCONTINUED | OUTPATIENT
Start: 2021-05-18 | End: 2021-05-18 | Stop reason: HOSPADM

## 2021-05-18 RX ORDER — FLUTICASONE PROPIONATE 50 MCG
1 SPRAY, SUSPENSION (ML) NASAL DAILY
Status: DISCONTINUED | OUTPATIENT
Start: 2021-05-18 | End: 2021-05-18 | Stop reason: HOSPADM

## 2021-05-18 RX ADMIN — ALUMINA, MAGNESIA, AND SIMETHICONE ORAL SUSPENSION REGULAR STRENGTH 30 ML: 1200; 1200; 120 SUSPENSION ORAL at 01:44

## 2021-05-18 RX ADMIN — DULOXETINE HYDROCHLORIDE 90 MG: 60 CAPSULE, DELAYED RELEASE ORAL at 08:09

## 2021-05-18 RX ADMIN — OMEGA-3 FATTY ACIDS CAP 1000 MG 1000 MG: 1000 CAP at 08:09

## 2021-05-18 RX ADMIN — POLYETHYLENE GLYCOL 3350 17 G: 17 POWDER, FOR SOLUTION ORAL at 08:09

## 2021-05-18 RX ADMIN — Medication 1000 UNITS: at 08:09

## 2021-05-18 RX ADMIN — METOPROLOL TARTRATE 50 MG: 50 TABLET, FILM COATED ORAL at 08:09

## 2021-05-18 RX ADMIN — LEVOTHYROXINE SODIUM 137 MCG: 25 TABLET ORAL at 05:03

## 2021-05-18 RX ADMIN — FLUTICASONE PROPIONATE 1 SPRAY: 50 SPRAY, METERED NASAL at 08:09

## 2021-05-18 RX ADMIN — SODIUM CHLORIDE, SODIUM LACTATE, POTASSIUM CHLORIDE, AND CALCIUM CHLORIDE 75 ML/HR: .6; .31; .03; .02 INJECTION, SOLUTION INTRAVENOUS at 01:49

## 2021-05-18 RX ADMIN — DOCUSATE SODIUM AND SENNOSIDES 1 TABLET: 8.6; 5 TABLET, FILM COATED ORAL at 08:09

## 2021-05-18 RX ADMIN — PANTOPRAZOLE SODIUM 40 MG: 40 INJECTION, POWDER, FOR SOLUTION INTRAVENOUS at 08:09

## 2021-05-18 RX ADMIN — METOCLOPRAMIDE HYDROCHLORIDE 5 MG: 5 INJECTION INTRAMUSCULAR; INTRAVENOUS at 08:09

## 2021-05-18 RX ADMIN — B-COMPLEX W/ C & FOLIC ACID TAB 1 TABLET: TAB at 08:09

## 2021-05-18 RX ADMIN — HEPARIN SODIUM 5000 UNITS: 5000 INJECTION INTRAVENOUS; SUBCUTANEOUS at 05:03

## 2021-05-18 NOTE — ASSESSMENT & PLAN NOTE
Lab Results   Component Value Date    HGBA1C 6 6 (H) 05/17/2021       Recent Labs     05/17/21  1624 05/17/21 2053 05/18/21  0736 05/18/21  1124   POCGLU 115 194* 148* 171*       Blood Sugar Average: Last 72 hrs:  (P) 157   Home insulin regimen:  NovoLog 14 units t i d  With meals, Levemir 32 units q h s    · Resume home regimen

## 2021-05-18 NOTE — PLAN OF CARE
Problem: PAIN - ADULT  Goal: Verbalizes/displays adequate comfort level or baseline comfort level  Description: Interventions:  - Encourage patient to monitor pain and request assistance  - Assess pain using appropriate pain scale  - Administer analgesics based on type and severity of pain and evaluate response  - Implement non-pharmacological measures as appropriate and evaluate response  - Consider cultural and social influences on pain and pain management  - Notify physician/advanced practitioner if interventions unsuccessful or patient reports new pain  Outcome: Progressing     Problem: INFECTION - ADULT  Goal: Absence or prevention of progression during hospitalization  Description: INTERVENTIONS:  - Assess and monitor for signs and symptoms of infection  - Monitor lab/diagnostic results  - Monitor all insertion sites, i e  indwelling lines, tubes, and drains  - Monitor endotracheal if appropriate and nasal secretions for changes in amount and color  - [place] appropriate cooling/warming therapies per order  - Administer medications as ordered  - Instruct and encourage patient and family to use good hand hygiene technique  - Identify and instruct in appropriate isolation precautions for identified infection/condition  Outcome: Progressing  Goal: Absence of fever/infection during neutropenic period  Description: INTERVENTIONS:  - Monitor WBC    Outcome: Progressing     Problem: SAFETY ADULT  Goal: Patient will remain free of falls  Description: INTERVENTIONS:  - Assess patient frequently for physical needs  -  Identify cognitive and physical deficits and behaviors that affect risk of falls    -  [place] fall precautions as indicated by assessment   - Educate patient/family on patient safety including physical limitations  - Instruct patient to call for assistance with activity based on assessment  - Modify environment to reduce risk of injury  - Consider OT/PT consult to assist with strengthening/mobility  Outcome: Progressing  Goal: Maintain or return to baseline ADL function  Description: INTERVENTIONS:  -  Assess patient's ability to carry out ADLs; assess patient's baseline for ADL function and identify physical deficits which impact ability to perform ADLs (bathing, care of mouth/teeth, toileting, grooming, dressing, etc )  - Assess/evaluate cause of self-care deficits   - Assess range of motion  - Assess patient's mobility; develop plan if impaired  - Assess patient's need for assistive devices and provide as appropriate  - Encourage maximum independence but intervene and supervise when necessary  - Involve family in performance of ADLs  - Assess for home care needs following discharge   - Consider OT consult to assist with ADL evaluation and planning for discharge  - Provide patient education as appropriate  Outcome: Progressing  Goal: Maintain or return mobility status to optimal level  Description: INTERVENTIONS:  - Assess patient's baseline mobility status (ambulation, transfers, stairs, etc )    - Identify cognitive and physical deficits and behaviors that affect mobility  - Identify mobility aids required to assist with transfers and/or ambulation (gait belt, sit-to-stand, lift, walker, cane, etc )  - [place] fall precautions as indicated by assessment  - Record patient progress and toleration of activity level on Mobility SBAR; progress patient to next Phase/Stage  - Instruct patient to call for assistance with activity based on assessment  - Consider rehabilitation consult to assist with strengthening/weightbearing, etc   Outcome: Progressing     Problem: DISCHARGE PLANNING  Goal: Discharge to home or other facility with appropriate resources  Description: INTERVENTIONS:  - Identify barriers to discharge w/patient and caregiver  - Arrange for needed discharge resources and transportation as appropriate  - Identify discharge learning needs (meds, wound care, etc )  - Arrange for interpretive services to assist at discharge as needed  - Refer to Case Management Department for coordinating discharge planning if the patient needs post-hospital services based on physician/advanced practitioner order or complex needs related to functional status, cognitive ability, or social support system  Outcome: Progressing     Problem: Knowledge Deficit  Goal: Patient/family/caregiver demonstrates understanding of disease process, treatment plan, medications, and discharge instructions  Description: Complete learning assessment and assess knowledge base    Interventions:  - Provide teaching at level of understanding  - Provide teaching via preferred learning methods  Outcome: Progressing

## 2021-05-18 NOTE — PROGRESS NOTES
[facility]) [facility]  Michael Murray 66 y o  female MRN: [id number]  Unit/Bed#: S -01 Encounter: [id number]         Assessment/Plan:   1  Epigastric pain  Associated with nausea vomiting, acid reflux, abdominal distension started the day of admission couple of hours after she had couple of chocolate bars  Otherwise patient was compliant with eating small portions      Plan:  · Started on Reglan low-dose before meals and bedtime while in the hospital, would not recommend continuing on discharge  Side effects were discussed with patient and she expressed understanding  EKG done on admission showed normal QTc  · Continue PPI and Pepcid  · Advanced diet as tolerable, small frequent portions, low-fat diet and avoid eating late at night  Avoid chocolate  · Tight glycemic control  · Continue Mylanta      2  Gastroparesis status post EGD with Botox on 05/13/2021  · Tight glycemic control     3  GERD without esophagitis  · Continue H2 blocker and PPI     4  Hiatal hernia post repair 5 years    Subjective:  Patient was lying comfortably in bed, still on 2 L oxygen for comfort  Patient reports improvement epigastric pain and nausea and she able to tolerate diet well  Complains of constipation, was given MiraLax      ROS: As noted in the HPI, otherwise all others negative  Objective:     Vitals: Blood pressure 116/60, pulse 61, temperature 97 9 °F (36 6 °C), temperature source Oral, resp  rate 16, height 5' 3" (1 6 m), weight 79 kg (174 lb 2 6 oz), SpO2 93 %  ,Body mass index is 30 85 kg/m²  Intake/Output Summary (Last 24 hours) at 5/18/2021 0932  Last data filed at 5/17/2021 2218  Gross per 24 hour   Intake 660 ml   Output 1400 ml   Net -740 ml       Physical Exam:     Physical Exam  Vitals signs and nursing note reviewed  Constitutional:       General: She is not in acute distress  Appearance: She is not ill-appearing or diaphoretic  HENT:      Head: Normocephalic and atraumatic  Mouth/Throat:      Mouth: Mucous membranes are moist       Pharynx: Oropharynx is clear  Eyes:      General: No scleral icterus  Extraocular Movements: Extraocular movements intact  Conjunctiva/sclera: Conjunctivae normal    Neck:      Musculoskeletal: Normal range of motion and neck supple  Cardiovascular:      Rate and Rhythm: Normal rate and regular rhythm  Heart sounds: Normal heart sounds  No murmur  Pulmonary:      Effort: Pulmonary effort is normal  No respiratory distress  Breath sounds: Normal breath sounds  No wheezing or rales  Abdominal:      General: Bowel sounds are normal       Palpations: Abdomen is soft  Tenderness: There is no abdominal tenderness  Musculoskeletal: Normal range of motion  Right lower leg: No edema  Left lower leg: No edema  Skin:     General: Skin is warm and dry  Capillary Refill: Capillary refill takes less than 2 seconds  Neurological:      General: No focal deficit present  Mental Status: She is alert and oriented to person, place, and time     Psychiatric:         Mood and Affect: Mood normal          Behavior: Behavior normal           Invasive Devices     Peripheral Intravenous Line            Peripheral IV 05/17/21 Right Antecubital 1 day                Lab Results:  Results from last 7 days   Lab Units 05/18/21  0623 05/17/21  0411   WBC Thousand/uL  --  7 70   HEMOGLOBIN g/dL  --  12 1   HEMATOCRIT %  --  38 8   PLATELETS Thousands/uL 207 259   NEUTROS PCT %  --  64   LYMPHS PCT %  --  25   MONOS PCT %  --  7   EOS PCT %  --  4     Results from last 7 days   Lab Units 05/18/21  0623 05/17/21  0411   POTASSIUM mmol/L 4 1 3 9   CHLORIDE mmol/L 104 101   CO2 mmol/L 28 30   BUN mg/dL 45* 54*   CREATININE mg/dL 1 92* 2 66*   CALCIUM mg/dL 8 3 8 6   ALK PHOS U/L  --  94   ALT U/L  --  31   AST U/L  --  29     Results from last 7 days   Lab Units 05/17/21  0414   INR  0 94     Results from last 7 days   Lab Units 05/17/21  0411   LIPASE u/L 35*       Imaging Studies: I have personally reviewed pertinent imaging studies  Xr Chest 1 View Portable    Result Date: 5/17/2021  Impression: No acute cardiopulmonary disease   Workstation performed: [id number]

## 2021-05-18 NOTE — DISCHARGE INSTR - AVS FIRST PAGE
Dear Amaya Miranda,     It was our pleasure to care for you here at [facility]  It is our hope that we were always able to exceed the expected standards for your care during your stay  You were hospitalized due to Gastroparesis and stomach pain  You were cared [place] [facility] by Eva Mckeon MD under the service of Heena Modi MD with the [facility] who covers for your primary care physician (PCP), María Pablo MD, while you were hospitalized  If you have any questions or concerns related to this hospitalization, you may contact us at [phone]  For follow up as well as any medication refills, we recommend that you follow up with your primary care physician  A registered nurse will reach out to you by phone within a few days after your discharge to answer any additional questions that you may have after going home  However, at this time we provide for you here, the most important instructions / recommendations at discharge:     · Notable Medication Adjustments -   · None  · Testing Required after Discharge -   · None  · Important follow up information -   · Please follow up with your Primary Care Provider in one week  · Please follow up with Gastroenterologist in one week  · Other Instructions -   Practice social distancing  Adequate hand washing hygiene  Wear mask in public at all times  · Please review this entire after visit summary as additional general instructions including medication list, appointments, activity, diet, any pertinent wound care, and other additional recommendations from your care team that may be provided for you        Sincerely,     Eva Mckeon MD

## 2021-05-18 NOTE — ASSESSMENT & PLAN NOTE
Lab Results   Component Value Date    EGFR 26 05/18/2021    EGFR 17 05/17/2021    EGFR 36 07/15/2020    CREATININE 1 92 (H) 05/18/2021    CREATININE 2 66 (H) 05/17/2021    CREATININE 1 47 (H) 07/15/2020     Baseline creatinine ranges from 1 5-1 9, documented to have CKD stage 3b  POA creatinine 2 66, EGFR 17 which puts her at CKD stage 4  Reports she last saw her nephrologist 2 weeks ago  · Patient ANDRA improved with IVF  · Discharge home to follow-up with PCP in 1 week  · Repeat BMP in 1 week

## 2021-05-18 NOTE — ASSESSMENT & PLAN NOTE
This is a 19-year-old female with a history of gastroparesis and GERD who presents with epigastric pain and abdominal distention which started overnight  Reports associated cough worse on lying down, likely due to reflux  No -bloody stool, -diarrhea, nausea/vomiting  In ED was given Mylanta which improved patient's discomfort  Pertinent imaging findings:  CXR - benign  Labs: Elevated Creatinine  · Patient tolerated her breakfast well  · Continue home Protonix  · Follow-up with outpatient GI as scheduled

## 2021-05-18 NOTE — DISCHARGE SUMMARY
[facility]  [facility] 1950[facility] 66 [facility]  [facility] MRN: [id number]  Unit/Bed#: W -18 Encounter: [id number]  Primary Care Provider: Tayo Xie MD   Date and time admitted to hospital: 5/17/2021  3:34 AM    * Epigastric abdominal pain with severe diabetic gastroparesis, status post EGD/Botox injection, 5/14  Assessment & Plan  This is a 70-year-old female with a history of gastroparesis and GERD who presents with epigastric pain and abdominal distention which started overnight  Reports associated cough worse on lying down, likely due to reflux  No -bloody stool, -diarrhea, nausea/vomiting  In ED was given Mylanta which improved patient's discomfort  Pertinent imaging findings:  CXR - benign  Labs: Elevated Creatinine  · Patient tolerated her breakfast well  · Continue home Protonix  · Follow-up with outpatient GI as scheduled  Acute kidney injury superimposed on chronic kidney disease [facility])  Assessment & Plan  Lab Results   Component Value Date    EGFR 26 05/18/2021    EGFR 17 05/17/2021    EGFR 36 07/15/2020    CREATININE 1 92 (H) 05/18/2021    CREATININE 2 66 (H) 05/17/2021    CREATININE 1 47 (H) 07/15/2020     Baseline creatinine ranges from 1 5-1 9, documented to have CKD stage 3b  POA creatinine 2 66, EGFR 17 which puts her at CKD stage 4  Reports she last saw her nephrologist 2 weeks ago  · Patient ANDRA improved with IVF  · Discharge home to follow-up with PCP in 1 week  · Repeat BMP in 1 week  Cough  Assessment & Plan  Secondary to acid reflux vs aspiration pneumonitis  CXR negative  Plan:  · Supportive care        Type 2 diabetes mellitus with diabetic nephropathy, with long-term current use of insulin [facility])  Assessment & Plan  Lab Results   Component Value Date    HGBA1C 6 6 (H) 05/17/2021       Recent Labs     05/17/21  1624 05/17/21  2053 05/18/21  0736 05/18/21  1124   POCGLU 115 194* 148* 171*       Blood Sugar Average: Last 72 hrs:  (P) 157   Home insulin regimen:  NovoLog 14 units t i d  With meals, Levemir 32 units q h s  · Resume home regimen    RLS (restless legs syndrome)  Assessment & Plan  He continue pramipexole    BARBARA on CPAP  Assessment & Plan  Continue CPAP at night    Discharging Resident Physician: Pearl Argueta MD  Attending: Juni Gutierres MD  PCP: Zaida Tristan MD  Admission Date: 5/17/2021  Discharge Date: 05/18/21    Disposition:     Home    Reason for Admission:  Epigastric pain secondary to gastroparesis    Consultations During Hospital Stay:  · GI team    Procedures Performed:     · None    Significant Findings / Test Results:     Xr Chest 1 View Portable Result Date: 5/17/2021 Impression: No acute cardiopulmonary disease  Incidental Findings:   · None    Test Results Pending at Discharge (will require follow up): · None     Outpatient Tests Requested:  · BMP    Complications:  None    Hospital Course:     Buck Woods is a 66 y o  female patient PMH pulmonary hypertension, gastroparesis, GERD, RLS, BARBARA on CPAP, DM type 2, COPD, CKD IIIB, who originally presented to the hospital on 5/17/2021 due to epigastric pain that had started a few hours before presenting to the ED  Patient had an EGD on 05/13/2021 for Botox injection at the pylorus as part of her treatment for gastroparesis  She states that she was instructed to avoid chocolate however did have chocolate that night and shortly after eating a chocolate her epigastric pain started  She also began coughing was concerned about possible aspiration  In the ED patient was afebrile hemodynamically stable and was treated with Mylanta which improved her epigastric pain  CBC was unremarkable  CMP was only remarkable for ANDRA with a creatinine of 2 6  Baseline creatinine is 1 4-1 8  Patient was started on IVF for ANDRA  During hospitalization patient continued on IV fluids and remained hemodynamically stable  Creatinine is improved    Patient was able to tolerate meals without epigastric pain  Patient was discharged in stable condition to follow up with outpatient GI as scheduled  Patient will also follow-up with primary care provider with repeat BMP  Condition at Discharge: stable     Discharge Day Visit / Exam:     Subjective:    Vitals: Blood Pressure: 116/60 (05/18/21 0629)  Pulse: 61 (05/18/21 0629)  Temperature: 97 9 °F (36 6 °C) (05/18/21 0629)  Temp Source: Oral (05/18/21 0629)  Respirations: 16 (05/18/21 0629)  Height: 5' 3" (160 cm) (05/18/21 0629)  Weight - Scale: 79 kg (174 lb 2 6 oz) (05/18/21 0629)  SpO2: 93 % (05/18/21 0629)  Exam:   Physical Exam  Vitals signs and nursing note reviewed  Constitutional:       General: She is not in acute distress  Appearance: Normal appearance  She is not ill-appearing, toxic-appearing or diaphoretic  HENT:      Head: Normocephalic and atraumatic  Nose: Nose normal       Mouth/Throat:      Mouth: Mucous membranes are moist       Pharynx: No oropharyngeal exudate or posterior oropharyngeal erythema  Eyes:      General: No scleral icterus  Right eye: No discharge  Left eye: No discharge  Pupils: Pupils are equal, round, and reactive to light  Neck:      Musculoskeletal: Normal range of motion and neck supple  No neck rigidity or muscular tenderness  Vascular: No carotid bruit  Cardiovascular:      Rate and Rhythm: Normal rate and regular rhythm  Pulses: Normal pulses  Heart sounds: Normal heart sounds  No murmur  No friction rub  No gallop  Pulmonary:      Effort: Pulmonary effort is normal  No respiratory distress  Breath sounds: Normal breath sounds  No stridor  No wheezing, rhonchi or rales  Abdominal:      General: Bowel sounds are normal       Palpations: Abdomen is soft  Tenderness: There is no abdominal tenderness  There is no right CVA tenderness, left CVA tenderness, guarding or rebound     Musculoskeletal:         General: No swelling, tenderness or deformity  Right lower leg: No edema  Left lower leg: No edema  Lymphadenopathy:      Cervical: No cervical adenopathy  Skin:     General: Skin is warm and dry  Capillary Refill: Capillary refill takes less than 2 seconds  Coloration: Skin is not jaundiced  Findings: No lesion or rash  Neurological:      General: No focal deficit present  Mental Status: She is alert and oriented to person, place, and time  Mental status is at baseline  Motor: No weakness  Psychiatric:         Mood and Affect: Mood normal          Behavior: Behavior normal          Thought Content: Thought content normal          Judgment: Judgment normal          Discussion with Family:   Jamel    Discharge instructions/Information to patient and family:   See after visit summary for information provided to patient and family  Provisions for Follow-Up Care:  See after visit summary for information related to follow-up care and any pertinent home health orders  Planned Readmission: None     Discharge Medications:  See after visit summary for reconciled discharge medications provided to patient and family        ** Please Note: This note has been constructed using a voice recognition system **

## 2021-05-19 ENCOUNTER — TRANSITIONAL CARE MANAGEMENT (OUTPATIENT)
Dept: FAMILY MEDICINE CLINIC | Facility: CLINIC | Age: 71
End: 2021-05-19

## 2021-05-19 ENCOUNTER — TELEPHONE (OUTPATIENT)
Dept: NEPHROLOGY | Facility: CLINIC | Age: 71
End: 2021-05-19

## 2021-05-19 DIAGNOSIS — N18.4 STAGE 4 CHRONIC KIDNEY DISEASE (HCC): ICD-10-CM

## 2021-05-19 DIAGNOSIS — N18.30 BENIGN HYPERTENSION WITH CKD (CHRONIC KIDNEY DISEASE) STAGE III (HCC): ICD-10-CM

## 2021-05-19 DIAGNOSIS — I12.9 BENIGN HYPERTENSION WITH CKD (CHRONIC KIDNEY DISEASE) STAGE III (HCC): ICD-10-CM

## 2021-05-19 DIAGNOSIS — I10 ESSENTIAL HYPERTENSION: Primary | ICD-10-CM

## 2021-05-19 DIAGNOSIS — D63.1 ANEMIA IN STAGE 4 CHRONIC KIDNEY DISEASE (HCC): ICD-10-CM

## 2021-05-19 DIAGNOSIS — N18.4 ANEMIA IN STAGE 4 CHRONIC KIDNEY DISEASE (HCC): ICD-10-CM

## 2021-05-19 LAB
ATRIAL RATE: 64 BPM
BACTERIA UR CULT: ABNORMAL
P AXIS: 73 DEGREES
PR INTERVAL: 248 MS
QRS AXIS: -6 DEGREES
QRSD INTERVAL: 94 MS
QT INTERVAL: 396 MS
QTC INTERVAL: 408 MS
T WAVE AXIS: 85 DEGREES
VENTRICULAR RATE: 64 BPM

## 2021-05-19 PROCEDURE — 93010 ELECTROCARDIOGRAM REPORT: CPT | Performed by: INTERNAL MEDICINE

## 2021-05-19 NOTE — TELEPHONE ENCOUNTER
Pt will go for labs prior to her follow up  No medication changes or adjustments have been made at this time per patient

## 2021-05-19 NOTE — TELEPHONE ENCOUNTER
----- Message from Vick Mahajan MD sent at 2021  9:45 AM EDT -----  I would recommend the followin  CMP/magnesium/phosphorus/urine protein creatinine ratio/CBC  2  Make sure she is on her usual medications   Thank you  ----- Message -----  From: Vivi Maci  Sent: 2021   9:37 AM EDT  To: Vick Mahajan MD    Patient scheduled to see you next week, she was recently in the hospital and was advised Cr increased, please advise what you would like prior to f/u

## 2021-05-20 ENCOUNTER — PROCEDURE VISIT (OUTPATIENT)
Dept: DERMATOLOGY | Facility: CLINIC | Age: 71
End: 2021-05-20
Payer: MEDICARE

## 2021-05-20 VITALS
WEIGHT: 172 LBS | TEMPERATURE: 97.6 F | DIASTOLIC BLOOD PRESSURE: 68 MMHG | HEIGHT: 63 IN | HEART RATE: 68 BPM | SYSTOLIC BLOOD PRESSURE: 128 MMHG | BODY MASS INDEX: 30.48 KG/M2 | RESPIRATION RATE: 18 BRPM

## 2021-05-20 DIAGNOSIS — D04.39 SQUAMOUS CELL CARCINOMA IN SITU (SCCIS) OF SKIN OF FOREHEAD: ICD-10-CM

## 2021-05-20 PROCEDURE — 17311 MOHS 1 STAGE H/N/HF/G: CPT | Performed by: STUDENT IN AN ORGANIZED HEALTH CARE EDUCATION/TRAINING PROGRAM

## 2021-05-20 PROCEDURE — 13132 CMPLX RPR F/C/C/M/N/AX/G/H/F: CPT | Performed by: STUDENT IN AN ORGANIZED HEALTH CARE EDUCATION/TRAINING PROGRAM

## 2021-05-20 NOTE — PATIENT INSTRUCTIONS
Mohs Microscopic Surgery After Care    WOUND CARE AFTER SURGERY:    1  Do NOT to remove the pressure bandage for 48 hours  Keep the area clean and dry while this bandage is on  2  After removing the bandage for the first time, gently clean the area with soap and water  If the bandage is difficult to remove, getting the bandage wet in the shower will sometimes help soften the adhesive and allow it to be removed more easily  3  You will now need to cleanse this area daily in the shower with gentle soap  There is no need to scrub the area  You will need to apply plain Vaseline ointment (this is over the counter and not a prescription) to the site for up to 2 weeks followed by a clean appropriately sized bandage to area  Non stick dressing and paper tape (or Hypafix) are recommended for sensitive skin but a bandaid is fine if it covers the area well  4  All your stitches will dissolve over the next two weeks  You will need to keep these moist with Vaseline and covered with a bandage over the next 2 weeks for them to dissolve appropriately  RESTRICTIONS:     For two DAYS:   - You will need to take it very easy as this time is highest risk for bleeding  Being a "couch potato" during these two days is generally recommended  - For surgeries on the face/neck/scalp: Avoid leaning down to pick things up off the floor as this brings blood up to your head  Instead, squat down to pick things up  For two WEEKS:   - No heavy lifting (anything greater than 10 pounds)   - You can start to do slow, gentle activities such as slow walking but nothing to increase your heart rate and blood pressure too much (such as cardiovascular exercise)  It is important to take it easy as there is still a risk for bleeding and a high risk popping of stitches open during this time  If we did surgery near the eyes (including the nose, forehead, front part of your scalp, cheeks):  It is VERY common to get a large amount of swelling around your eyes (puffy eyes)  Although less frequent, this can be enough to swell your eyes shut and can also come along with bruising  This should not hurt and is very expected and normal  It is typically worst at ~ 3 days out from your surgery and dramatically better 1 week post-operatively  If we did surgery around your nose: No blowing your nose as this puts you at higher risk of popping stitches durign this time  Instead dab under your nose with a tissue or use a Q-tip inside your nose  If we did surgery on the skin above or bellow your lip or your lip itself: No sipping from straws as this uses a lot of the muscles around your mouth and increases the risk of popping stitches during this time  MANAGING YOUR PAIN AFTER SURGERY     You can expect to have some pain after surgery  This is normal  The pain is typically worse the first two days after surgery, and quickly begins to get better  The best strategy for controlling your pain after surgery is around the clock pain control  You can take over the counter Acetaminophen (Tylenol) for discomfort, if no contraindications  If you are taking this at the maximum dose, you can alternate this with Motrin (ibuprofen or Advil) as well  Alternating these medications with each other allows you to maximize your pain control  In addition to Tylenol and Motrin, you can use heating pads or ice packs on your incisions to help reduce your pain  How will I alternate your regular strength over-the-counter pain medication? You will take a dose of pain medication every three hours   Start by taking 650 mg of Tylenol (2 pills of 325 mg)   3 hours later take 600 mg of Motrin (3 pills of 200 mg)   3 hours after taking the Motrin take 650 mg of Tylenol   3 hours after that take 600 mg of Motrin      See example - if your first dose of Tylenol is at 12:00 PM     12:00 PM  Tylenol 650 mg (2 pills of 325 mg)    3:00 PM  Motrin 600 mg (3 pills of 200 mg)    6:00 PM  Tylenol 650 mg (2 pills of 325 mg)    9:00 PM  Motrin 600 mg (3 pills of 200 mg)    Continue alternating every 3 hours      Important:   Do not take more than 4000mg of Tylenol or 3200mg of Motrin in a 24-hour period  What if I still have pain? If you have pain that is not controlled with the over-the-counter pain medications (Tylenol and Motrin or Advil), don't hesitate to call our staff using the number provided  We will help make sure you are managing your pain in the best way possible, and if necessary, we can provide a prescription for additional pain medication  CALL OUR OFFICE IMMEDIATELY FOR ANY SIGNS OF INFECTION:    This includes fever, chills, increased redness, warmth, tenderness, severe discomfort/pain, or pus or foul smell coming from the wound  [facility] directly at [phone] (SKIN)    IF BLEEDING IS NOTICED:    Place a clean cloth over the area and apply firm pressure for thirty minutes  Check the wound ONLY after 30 minutes of direct pressure; do not cheat and sneak a peak, as that does not count  If bleeding persists after 30 minutes of legitimate direct pressure, then try one more round of direct pressure to the area  Should the bleeding become heavier or not stop after the second attempt, call [facility] directly at [phone] (SKIN)  Your call will get routed to the dermatology surgeon on call even after hours

## 2021-05-20 NOTE — PROGRESS NOTES
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  Past Medical History:   Diagnosis Date    Allergic     Anxiety     Arthritis     Aspiration into airway     Basal cell carcinoma     left cheek     Chronic kidney disease ,     Stones, kidney disease stage 3    Diabetes mellitus ([facility] )     Disease of thyroid gland     Family history of thyroid problem     GERD (gastroesophageal reflux disease)     Heart burn     Hyperplasia, parathyroid ([facility] )     Hypertension     Kidney problem     Obesity (BMI 30 0-34  9)     Pneumonia     RLS (restless legs syndrome)     SCC (squamous cell carcinoma) 2021    left mid forehead    Seasonal allergies     Sleep apnea     uses CPAP    Squamous cell skin cancer 2007    left cheek     Swollen ankles        Past Surgical History:   Procedure Laterality Date    ARTHROSCOPY KNEE      BREAST BIOPSY      stereotactic-benign    BREAST BIOPSY      stereo-benign    BREAST EXCISIONAL BIOPSY      unknown date-benign    BREAST EXCISIONAL BIOPSY      unknown date-benign    BREAST EXCISIONAL BIOPSY      unknown date-benign    BREAST EXCISIONAL BIOPSY      unknown age-benign    CHOLECYSTECTOMY      HIATAL HERNIA REPAIR      LIPOSUCTION      MOHS SURGERY  2021    left mid forehead-Evan    REDUCTION MAMMAPLASTY Bilateral     SKIN BIOPSY      SKIN CANCER EXCISION  2007    squamous cell carcinoma     SKIN CANCER EXCISION  2007    basal cell carcinoma    SQUAMOUS CELL CARCINOMA EXCISION      TOE SURGERY      TONSILLECTOMY      TUBAL LIGATION           Current Outpatient Medications:     acetaminophen (TYLENOL) 500 mg tablet, Take 1,000 mg by mouth as needed , Disp: , Rfl:     albuterol (Ventolin HFA) 90 mcg/act inhaler, Inhale 2 puffs every 6 (six) hours as needed for wheezing or shortness of breath, Disp: , Rfl: 0    amLODIPine (NORVASC) 5 mg tablet, TAKE 1 TABLET BY MOUTH EVERY DAY, Disp: 90 tablet, Rfl: 3    b complex vitamins tablet, Take 1 tablet by mouth daily , Disp: , Rfl:     B-D ULTRAFINE III SHORT PEN 31G X 8 MM MISC, USE AS DIRECTED 4 TIMES PER DAY, Disp: , Rfl: 3    doxazosin (CARDURA) 2 mg tablet, TAKE 1 TABLET (2 MG TOTAL) BY MOUTH DAILY AT BEDTIME, Disp: 90 tablet, Rfl: 1    DULoxetine (Cymbalta) 30 mg delayed release capsule, Take 30 mg by mouth daily, Disp: , Rfl:     DULoxetine (CYMBALTA) 60 mg delayed release capsule, Take 90 mg by mouth daily, Disp: , Rfl:     famotidine (PEPCID) 40 MG tablet, Take 1 tablet (40 mg total) by mouth daily Take daily in am, Disp: 90 tablet, Rfl: 3    fluticasone (FLONASE) 50 mcg/act nasal spray, 1-2 sprays daily, Disp: , Rfl:     gabapentin (NEURONTIN) 300 mg capsule, Take 300 mg by mouth 2 (two) times a day , Disp: , Rfl:     Icosapent Ethyl (Vascepa) 1 g CAPS, Take 1 g by mouth 2 (two) times a day, Disp: , Rfl:     insulin aspart (NovoLOG) 100 units/mL injection, Inject under the skin 3 (three) times a day before meals 14 units, 14 units, 18 units  , Disp: , Rfl:     insulin detemir (LEVEMIR) 100 units/mL subcutaneous injection, Inject 32 Units under the skin daily at bedtime (Patient taking differently: Inject 40 Units under the skin daily at bedtime ), Disp: , Rfl: 0    levothyroxine 137 mcg tablet, Take 137 mcg by mouth, Disp: , Rfl:     metoprolol tartrate (LOPRESSOR) 50 mg tablet, Take 1 tablet (50 mg total) by mouth every 12 (twelve) hours, Disp: 180 tablet, Rfl: 3    olmesartan (BENICAR) 40 mg tablet, Take 1 tablet (40 mg total) by mouth daily, Disp: 90 tablet, Rfl: 0    pantoprazole (PROTONIX) 20 mg tablet, Take 1 tablet (20 mg total) by mouth daily, Disp: , Rfl:     pramipexole (MIRAPEX) 0 25 mg tablet, Take 1 tablet (0 25 mg total) by mouth daily, Disp: 90 tablet, Rfl: 3   rosuvastatin (CRESTOR) 5 mg tablet, TAKE 1 TABLET BY MOUTH EVERY DAY AT NIGHT, Disp: , Rfl:     torsemide (DEMADEX) 20 mg tablet, Take 1 tablet (20 mg total) by mouth daily, Disp: 90 tablet, Rfl: 3    Vitamin D, Ergocalciferol, 2000 units CAPS, Take 2,000 Units by mouth daily , Disp: , Rfl:     Allergies   Allergen Reactions    Pollen Extract     Ciprofloxacin Hives and Itching       Physical Exam:   Vitals:    05/20/21 0808   BP: 128/68   Pulse: 68   Resp: 18   Temp: 97 6 °F (36 4 °C)     General: Awake, Alert, Oriented x 3, Mood and affect appropriate  Respiratory: Respirations even and unlabored  Cardiovascular: Peripheral pulses intact; no edema  Musculoskeletal Exam: N/A  Skin:1 cm x 1 cm pink macule, left forehead    Assessment: Biopsy confirmed squamous cell carcinoma in situ, left forehead    Plan:  Mohs    MOHS Procedure Timeout      Most Recent Value   Timeout:  0815   Patient Identity Verified:  Yes   Correct Site Verified:  Yes   Correct Procedure Verified:  Yes          MOHS Diagnosis/Indication/Location/ID      Most Recent Value   Pathology Type  Squamous cell carcinoma   Anatomic Site  -- [Left mid forehead]   Indications for MOHS  tumor location, large tumor size, porrly defined tumor margins   MOHS ID  [id number]          MOHS Site/Accession/Pre-Post      Most Recent Value   Original Site Identified (as submitted by referring clinician)  Photo   Biopsy Accession/Specimen # (as submitted by referring clincian)  [id number]   Pre-MOHS Size Length (cm)  1   Pre-MOHS Size Width (cm)  1   Post-MOHS Size-Length (cm)  1 2   Post MOHS Size-Width (cm)  1 3   Repair Type  Complex layered closure   Suture Type  Fast absorbing gut, Vicryl   Fast Absorbing Suture Size  5   Vicryl Suture Size  5   Final repair length (cm):  5 5   Anesthetic Used  1% Lidocaine with epinephrine [with Bicarb]          MOHS Tumor Stage 1 Information      Most Recent Value   Tissue Sections (blocks)  2   Microscopic Exam Section 1: No tumor identified in section  Microscopic Exam Section 2:  No tumor identified in section  Tumor Clear After Stage I? Yes                        Patient identified, procedure verified, site identified and verified  Time out completed  Surgical removal of the lesion discussed with the patient (risks and benefits, including possibility of scarring, infection, recurrence or potential for further treatment)  I have specifically identified the site with the patient  I have discussed the fact that the patient will have a scar after the procedure regardless of granulation or repair with sutures  I have discussed that the repair options can range from granulation in some cases to linear or curvilinear closures to larger flaps or grafts  There are sometimes flaps or grafts used that require multiples stages of surgery and will not be completed today, rather be completed over a series of appointments  I have discussed that occasionally due to location, size or depth of the lesion I may recommend consultation with and transfer of care for further removal or the reconstruction to another provider such as ophthalmology surgery, plastic surgery, ENT surgery, or surgical oncology  There are cases in which other testing such as imaging with MRI or CT scan or testing of lymph nodes is recommended because of the nature/depth/location of tumor seen during the removal  There is a risk of injury to nerves causing temporary or permanent numbness or the inability to move muscles full such as the inability to lift eyebrows  Questions answered and verbal and written consent was obtained  The tumor qualifies for Mohs based on AUC criteria  Dr Mesha Braun served as the surgeon and pathologist during the procedure  With the patient in the supine position and under adequate local anesthesia with 1% lidocaine with epinephrine 1:200,000, the defect was scrubbed with Hibiclens  Sterile drapes were placed from the sterile tray    Because of the location of the surgical defect, a complex closure was judged to give the best possible cosmetic and functional result  The edges of the defect were carefully debrided removing any dead or coagulated tissue  This was a complex closure because of the following: There was involvement of the free margin of the eyelid     Hemostasis was obtained by pinpoint electrocoagulation  Careful planning of removal of redundant tissue at either end of the defect was drawn out so that the suture lines would fall in the optimal orientation with regard to the relaxed skin tension lines  These were then removed with a #15 blade scalpel  The wound was then approximated by a deep layer of buried vertical mattress sutures and the cutaneous margins were approximated and closed by superficial sutures as noted above  Estimated blood loss was less than 5 mL  The patient tolerated the procedure well  The wound was dressed with petrolatum, a non-stick pad, and a compression dressing  Manjo Hagen MD served as the surgeon and pathologist during the procedure  Postoperative care: Wound care discussed at length  I urged the patient to call us if any problems or question should arise  Complications: none  Post-op medications: none  Patient condition after procedure: stable  Discharge plans: Plan for follow up with general dermatology as scheduled        Scribe Attestation    I,:  Luisana Rizo MA am acting as a scribe while in the presence of the attending physician :       I,:  Manoj Hagen MD personally performed the services described in this documentation    as scribed in my presence :

## 2021-05-22 ENCOUNTER — APPOINTMENT (EMERGENCY)
Dept: CT IMAGING | Facility: HOSPITAL | Age: 71
End: 2021-05-22
Payer: MEDICARE

## 2021-05-22 ENCOUNTER — HOSPITAL ENCOUNTER (EMERGENCY)
Facility: HOSPITAL | Age: 71
Discharge: HOME | End: 2021-05-22
Attending: EMERGENCY MEDICINE | Admitting: EMERGENCY MEDICINE
Payer: MEDICARE

## 2021-05-22 VITALS
BODY MASS INDEX: 30.11 KG/M2 | DIASTOLIC BLOOD PRESSURE: 69 MMHG | SYSTOLIC BLOOD PRESSURE: 132 MMHG | OXYGEN SATURATION: 95 % | HEART RATE: 69 BPM | RESPIRATION RATE: 16 BRPM | TEMPERATURE: 98.4 F | WEIGHT: 170 LBS

## 2021-05-22 DIAGNOSIS — N18.4 CKD (CHRONIC KIDNEY DISEASE) STAGE 4, GFR 15-29 ML/MIN (HCC): ICD-10-CM

## 2021-05-22 DIAGNOSIS — N39.0 UTI (URINARY TRACT INFECTION): Primary | ICD-10-CM

## 2021-05-22 LAB
ALBUMIN SERPL BCP-MCNC: 3 G/DL (ref 3.5–5)
ALP SERPL-CCNC: 94 U/L (ref 46–116)
ALT SERPL W P-5'-P-CCNC: 35 U/L (ref 12–78)
ANION GAP SERPL CALCULATED.3IONS-SCNC: 11 MMOL/L (ref 4–13)
AST SERPL W P-5'-P-CCNC: 40 U/L (ref 5–45)
BACTERIA BLD CULT: NORMAL
BACTERIA BLD CULT: NORMAL
BACTERIA UR QL AUTO: ABNORMAL /HPF
BASOPHILS # BLD AUTO: 0.03 THOUSANDS/ΜL (ref 0–0.1)
BASOPHILS NFR BLD AUTO: 0 % (ref 0–1)
BILIRUB SERPL-MCNC: 0.42 MG/DL (ref 0.2–1)
BILIRUB UR QL STRIP: NEGATIVE
BUN SERPL-MCNC: 36 MG/DL (ref 5–25)
CALCIUM ALBUM COR SERPL-MCNC: 9.9 MG/DL (ref 8.3–10.1)
CALCIUM SERPL-MCNC: 9.1 MG/DL (ref 8.3–10.1)
CHLORIDE SERPL-SCNC: 101 MMOL/L (ref 100–108)
CLARITY UR: CLEAR
CO2 SERPL-SCNC: 31 MMOL/L (ref 21–32)
COLOR UR: YELLOW
CREAT SERPL-MCNC: 1.79 MG/DL (ref 0.6–1.3)
EOSINOPHIL # BLD AUTO: 0.31 THOUSAND/ΜL (ref 0–0.61)
EOSINOPHIL NFR BLD AUTO: 4 % (ref 0–6)
ERYTHROCYTE [DISTWIDTH] IN BLOOD BY AUTOMATED COUNT: 14.9 % (ref 11.6–15.1)
GFR SERPL CREATININE-BSD FRML MDRD: 28 ML/MIN/1.73SQ M
GLUCOSE SERPL-MCNC: 153 MG/DL (ref 65–140)
GLUCOSE UR STRIP-MCNC: NEGATIVE MG/DL
HCT VFR BLD AUTO: 36.5 % (ref 34.8–46.1)
HGB BLD-MCNC: 11.9 G/DL (ref 11.5–15.4)
HGB UR QL STRIP.AUTO: NEGATIVE
IMM GRANULOCYTES # BLD AUTO: 0.01 THOUSAND/UL (ref 0–0.2)
IMM GRANULOCYTES NFR BLD AUTO: 0 % (ref 0–2)
KETONES UR STRIP-MCNC: NEGATIVE MG/DL
LEUKOCYTE ESTERASE UR QL STRIP: ABNORMAL
LYMPHOCYTES # BLD AUTO: 1.96 THOUSANDS/ΜL (ref 0.6–4.47)
LYMPHOCYTES NFR BLD AUTO: 24 % (ref 14–44)
MCH RBC QN AUTO: 27.8 PG (ref 26.8–34.3)
MCHC RBC AUTO-ENTMCNC: 32.6 G/DL (ref 31.4–37.4)
MCV RBC AUTO: 85 FL (ref 82–98)
MONOCYTES # BLD AUTO: 0.56 THOUSAND/ΜL (ref 0.17–1.22)
MONOCYTES NFR BLD AUTO: 7 % (ref 4–12)
NEUTROPHILS # BLD AUTO: 5.19 THOUSANDS/ΜL (ref 1.85–7.62)
NEUTS SEG NFR BLD AUTO: 65 % (ref 43–75)
NITRITE UR QL STRIP: NEGATIVE
NON-SQ EPI CELLS URNS QL MICRO: ABNORMAL /HPF
NRBC BLD AUTO-RTO: 0 /100 WBCS
PH UR STRIP.AUTO: 7 [PH] (ref 4.5–8)
PLATELET # BLD AUTO: 241 THOUSANDS/UL (ref 149–390)
PMV BLD AUTO: 11.2 FL (ref 8.9–12.7)
POTASSIUM SERPL-SCNC: 4.9 MMOL/L (ref 3.5–5.3)
PROT SERPL-MCNC: 7.1 G/DL (ref 6.4–8.2)
PROT UR STRIP-MCNC: ABNORMAL MG/DL
RBC # BLD AUTO: 4.28 MILLION/UL (ref 3.81–5.12)
RBC #/AREA URNS AUTO: ABNORMAL /HPF
SODIUM SERPL-SCNC: 143 MMOL/L (ref 136–145)
SP GR UR STRIP.AUTO: 1.01 (ref 1–1.03)
UROBILINOGEN UR QL STRIP.AUTO: 1 E.U./DL
WBC # BLD AUTO: 8.06 THOUSAND/UL (ref 4.31–10.16)
WBC #/AREA URNS AUTO: ABNORMAL /HPF

## 2021-05-22 PROCEDURE — 99284 EMERGENCY DEPT VISIT MOD MDM: CPT

## 2021-05-22 PROCEDURE — 36415 COLL VENOUS BLD VENIPUNCTURE: CPT | Performed by: EMERGENCY MEDICINE

## 2021-05-22 PROCEDURE — 80053 COMPREHEN METABOLIC PANEL: CPT | Performed by: EMERGENCY MEDICINE

## 2021-05-22 PROCEDURE — 74176 CT ABD & PELVIS W/O CONTRAST: CPT

## 2021-05-22 PROCEDURE — 81001 URINALYSIS AUTO W/SCOPE: CPT

## 2021-05-22 PROCEDURE — 96365 THER/PROPH/DIAG IV INF INIT: CPT

## 2021-05-22 PROCEDURE — 85025 COMPLETE CBC W/AUTO DIFF WBC: CPT | Performed by: EMERGENCY MEDICINE

## 2021-05-22 PROCEDURE — G1004 CDSM NDSC: HCPCS

## 2021-05-22 PROCEDURE — 99285 EMERGENCY DEPT VISIT HI MDM: CPT | Performed by: EMERGENCY MEDICINE

## 2021-05-22 RX ORDER — SULFAMETHOXAZOLE AND TRIMETHOPRIM 400; 80 MG/1; MG/1
1 TABLET ORAL EVERY 12 HOURS SCHEDULED
Qty: 14 TABLET | Refills: 0 | Status: SHIPPED | OUTPATIENT
Start: 2021-05-22 | End: 2021-05-29

## 2021-05-22 RX ADMIN — CEFEPIME HYDROCHLORIDE 2000 MG: 2 INJECTION, POWDER, FOR SOLUTION INTRAVENOUS at 09:04

## 2021-05-22 NOTE — ED PROVIDER NOTES
History  Chief Complaint   Patient presents with    Possible UTI     HPI     Haroon Dozier is a 66 y o  female with history of CKD stage 4, DM 2, HTN, hyperlipidemia, and kidney stones who presents to the ED with complaints of dysuria, frequency, and urgency which started yesterday  She denies hematuria  No flank pain or abdominal pain but admits to lower abdominal pressure  No vaginal discharge  No fevers or chills but admits to malaise  Denies nausea, vomiting, diarrhea  She usually has some stress incontinence at baseline and has noticed increased number of times she has not made it to the bathroom in time over the last 4 days  She also admits to a dry hacking cough which she attributes to her acid reflux  No chest pain, shortness of breath, or palpitations  Patient reports that she was recently seen in the ED and admitted under observation 5/17/21 to 5/18/21 for epigastric pain and ANDRA  During her stay she had a UA done  She was called by ER staff on 5/20 and was left voicemail that culture results were positive  Patient returned call yesterday afternoon  Nadja Mirza PA-C reviewed results and recommended starting bactrim  Patient was hesitant and preferred to remain off antibiotics as she has an appointment with her Nephrologist, Dr Mariposa Calles, on 5/25/21  She was advised to return to the ER if she had fever or worsening symptoms  Patient also reports that she had MOHS procedure on 5/20/21 for [facility] on her forehead  She is going to be having an additional procedure on 5/27/21 for [facility] on her nose  Prior to Admission Medications   Prescriptions Last Dose Informant Patient Reported? Taking?    B-D ULTRAFINE III SHORT PEN 31G X 8 MM MISC  Self Yes No   Sig: USE AS DIRECTED 4 TIMES PER DAY   DULoxetine (CYMBALTA) 60 mg delayed release capsule  Self Yes No   Sig: Take 90 mg by mouth daily   DULoxetine (Cymbalta) 30 mg delayed release capsule   Yes No   Sig: Take 30 mg by mouth daily   Icosapent Ethyl (Vascepa) 1 g CAPS  Self Yes No   Sig: Take 1 g by mouth 2 (two) times a day   Vitamin D, Ergocalciferol, 2000 units CAPS  Self Yes No   Sig: Take 2,000 Units by mouth daily    acetaminophen (TYLENOL) 500 mg tablet  Self Yes No   Sig: Take 1,000 mg by mouth as needed    albuterol (Ventolin HFA) 90 mcg/act inhaler  Self No No   Sig: Inhale 2 puffs every 6 (six) hours as needed for wheezing or shortness of breath   amLODIPine (NORVASC) 5 mg tablet   No No   Sig: TAKE 1 TABLET BY MOUTH EVERY DAY   b complex vitamins tablet  Self Yes No   Sig: Take 1 tablet by mouth daily    doxazosin (CARDURA) 2 mg tablet   No No   Sig: TAKE 1 TABLET (2 MG TOTAL) BY MOUTH DAILY AT BEDTIME   famotidine (PEPCID) 40 MG tablet   No No   Sig: Take 1 tablet (40 mg total) by mouth daily Take daily in am   fluticasone (FLONASE) 50 mcg/act nasal spray  Self Yes No   Si-2 sprays daily   gabapentin (NEURONTIN) 300 mg capsule  Self Yes No   Sig: Take 300 mg by mouth 2 (two) times a day    insulin aspart (NovoLOG) 100 units/mL injection  Self Yes No   Sig: Inject under the skin 3 (three) times a day before meals 14 units, 14 units, 18 units     insulin detemir (LEVEMIR) 100 units/mL subcutaneous injection  Self No No   Sig: Inject 32 Units under the skin daily at bedtime   Patient taking differently: Inject 40 Units under the skin daily at bedtime    levothyroxine 137 mcg tablet   Yes No   Sig: Take 137 mcg by mouth   metoprolol tartrate (LOPRESSOR) 50 mg tablet   No No   Sig: Take 1 tablet (50 mg total) by mouth every 12 (twelve) hours   olmesartan (BENICAR) 40 mg tablet   No No   Sig: Take 1 tablet (40 mg total) by mouth daily   pantoprazole (PROTONIX) 20 mg tablet   No No   Sig: Take 1 tablet (20 mg total) by mouth daily   pramipexole (MIRAPEX) 0 25 mg tablet  Self No No   Sig: Take 1 tablet (0 25 mg total) by mouth daily   rosuvastatin (CRESTOR) 5 mg tablet  Self Yes No   Sig: TAKE 1 TABLET BY MOUTH EVERY DAY AT NIGHT   torsemide (DEMADEX) 20 mg tablet  Self No No   Sig: Take 1 tablet (20 mg total) by mouth daily      Facility-Administered Medications: None       Past Medical History:   Diagnosis Date    Allergic     Anxiety     Arthritis     Aspiration into airway     Basal cell carcinoma 2007    left cheek     Chronic kidney disease 2000, 2018    Stones, kidney disease stage 3    Diabetes mellitus [facility] [address] [facility]     Disease of thyroid gland     Family history of thyroid problem     GERD (gastroesophageal reflux disease)     Heart burn     Hyperplasia, parathyroid ([facility] [address] [facility]     Hypertension     Kidney problem     Obesity (BMI 30 0-34  9)     Pneumonia     RLS (restless legs syndrome)     SCC (squamous cell carcinoma) 05/04/2021    left mid forehead    Seasonal allergies     Sleep apnea     uses CPAP    Squamous cell skin cancer 2007    left cheek     Swollen ankles        Past Surgical History:   Procedure Laterality Date    ARTHROSCOPY KNEE      BREAST BIOPSY      stereotactic-benign    BREAST BIOPSY      stereo-benign    BREAST EXCISIONAL BIOPSY      unknown date-benign    BREAST EXCISIONAL BIOPSY      unknown date-benign    BREAST EXCISIONAL BIOPSY      unknown date-benign    BREAST EXCISIONAL BIOPSY      unknown age-benign    CHOLECYSTECTOMY      HIATAL HERNIA REPAIR      LIPOSUCTION      MOHS SURGERY  05/20/2021    left mid forehead-Evan    REDUCTION MAMMAPLASTY Bilateral 2000    SKIN BIOPSY      SKIN CANCER EXCISION  2007    squamous cell carcinoma     SKIN CANCER EXCISION  2007    basal cell carcinoma    SQUAMOUS CELL CARCINOMA EXCISION      TOE SURGERY      TONSILLECTOMY      TUBAL LIGATION         Family History   Problem Relation Age of Onset    Heart disease Mother     Depression Mother     Hypertension Mother    Tayo Vitale COPD Mother    Tayo Vitale Hearing loss Mother     Heart disease Father    Tayo Vitale Lung cancer Father 79        Smoker     Cancer Father         brain    Alcohol abuse Father     Dementia Father    Tayo Lamame Hypertension Father     Thyroid disease Father     COPD Father     Arthritis Father     Brain cancer Father 76    Hypertension Sister     Diabetes Sister     Heart disease Sister     Thyroid disease Sister     Cancer Sister         Lympoma    Lung cancer Sister 78    Hypertension Brother     Diabetes Brother     Cancer Brother         Throat    Dementia Brother     Stroke Brother     Hypertension Brother     No Known Problems Son     No Known Problems Son     Heart disease Brother     Diabetes Brother     Brain cancer Paternal Aunt         unknown age     I have reviewed and agree with the history as documented  E-Cigarette/Vaping    E-Cigarette Use Never User      E-Cigarette/Vaping Substances    Nicotine No     THC No     CBD No     Flavoring No     Other No     Unknown No      Social History     Tobacco Use    Smoking status: Former Smoker     Packs/day: 2 00     Years: 10 00     Pack years: 20 00     Types: Cigarettes     Start date: 1967     Quit date:      Years since quittin 4    Smokeless tobacco: Never Used   Substance Use Topics    Alcohol use: Not Currently     Alcohol/week: 0 0 standard drinks    Drug use: No        Review of Systems   Constitutional: Negative for activity change, appetite change, chills, fatigue and fever  HENT: Negative for ear pain and sore throat  Eyes: Negative for pain and visual disturbance  Respiratory: Negative for cough and shortness of breath  Cardiovascular: Negative for chest pain and palpitations  Gastrointestinal: Positive for abdominal pain  Negative for vomiting  Abdominal distention: suprapubic pressure  Endocrine: Negative for polydipsia  Genitourinary: Positive for dysuria, flank pain, frequency and urgency  Negative for hematuria  Musculoskeletal: Negative for arthralgias and back pain  Skin: Negative for color change and rash  Neurological: Negative for seizures and syncope  Psychiatric/Behavioral: Negative for confusion  The patient is not nervous/anxious  All other systems reviewed and are negative  Physical Exam  ED Triage Vitals [05/22/21 0719]   Temperature Pulse Respirations Blood Pressure SpO2   98 4 °F (36 9 °C) 72 18 145/72 94 %      Temp Source Heart Rate Source Patient Position - Orthostatic VS BP Location FiO2 (%)   Oral Monitor -- -- --      Pain Score       4             Orthostatic Vital Signs  Vitals:    05/22/21 0719 05/22/21 1041   BP: 145/72 132/69   Pulse: 72 69       Physical Exam  Vitals signs and nursing note reviewed  Constitutional:       General: She is not in acute distress  Appearance: Normal appearance  She is well-developed  HENT:      Head: Normocephalic and atraumatic  Comments: Bandage over mid-forehead from recent MOHS procedure  Ecchymosis noted beneath left eye  Mildly erythematous scaly lesion on bridge of nose     Right Ear: External ear normal       Left Ear: External ear normal       Nose: Nose normal       Mouth/Throat:      Mouth: Mucous membranes are moist       Pharynx: Oropharynx is clear  Eyes:      General: No scleral icterus  Extraocular Movements: Extraocular movements intact  Conjunctiva/sclera: Conjunctivae normal       Pupils: Pupils are equal, round, and reactive to light  Neck:      Musculoskeletal: Normal range of motion and neck supple  Cardiovascular:      Rate and Rhythm: Normal rate and regular rhythm  Pulses: Normal pulses  Heart sounds: Normal heart sounds  No murmur  Pulmonary:      Effort: Pulmonary effort is normal  No respiratory distress  Breath sounds: Normal breath sounds  Comments: Occasional dry cough noted  Abdominal:      General: Abdomen is flat  Bowel sounds are normal  There is no distension  Palpations: Abdomen is soft  Tenderness: There is no abdominal tenderness  There is no right CVA tenderness, left CVA tenderness, guarding or rebound  Comments: No tenderness in suprapubic region   Musculoskeletal:         General: No deformity  Right lower leg: No edema  Left lower leg: No edema  Skin:     General: Skin is warm and dry  Capillary Refill: Capillary refill takes less than 2 seconds  Neurological:      General: No focal deficit present  Mental Status: She is alert and oriented to person, place, and time  Mental status is at baseline     Psychiatric:         Mood and Affect: Mood normal          Behavior: Behavior normal          ED Medications  Medications   cefepime (MAXIPIME) 2 g/50 mL dextrose IVPB (0 mg Intravenous Stopped 5/22/21 0947)       Diagnostic Studies  Results Reviewed     Procedure Component Value Units Date/Time    Comprehensive metabolic panel [[id number]]  (Abnormal) Collected: 05/22/21 0837    Lab Status: Final result Specimen: Blood from Arm, Right Updated: 05/22/21 0926     Sodium 143 mmol/L      Potassium 4 9 mmol/L      Chloride 101 mmol/L      CO2 31 mmol/L      ANION GAP 11 mmol/L      BUN 36 mg/dL      Creatinine 1 79 mg/dL      Glucose 153 mg/dL      Calcium 9 1 mg/dL      Corrected Calcium 9 9 mg/dL      AST 40 U/L      ALT 35 U/L      Alkaline Phosphatase 94 U/L      Total Protein 7 1 g/dL      Albumin 3 0 g/dL      Total Bilirubin 0 42 mg/dL      eGFR 28 ml/min/1 73sq m     Narrative:      [place] guidelines for Chronic Kidney Disease (CKD):     Stage 1 with normal or high GFR (GFR > 90 mL/min/1 73 square meters)    Stage 2 Mild CKD (GFR = 60-89 mL/min/1 73 square meters)    Stage 3A Moderate CKD (GFR = 45-59 mL/min/1 73 square meters)    Stage 3B Moderate CKD (GFR = 30-44 mL/min/1 73 square meters)    Stage 4 Severe CKD (GFR = 15-29 mL/min/1 73 square meters)    Stage 5 End Stage CKD (GFR <15 mL/min/1 73 square meters)  Note: GFR calculation is accurate only with a steady state creatinine    Urine Microscopic [[id number]]  (Abnormal) Collected: 05/22/21 0734    Lab Status: Final result Specimen: Urine, Clean Catch Updated: 05/22/21 0851     RBC, UA 0-1 /hpf      WBC, UA 4-10 /hpf      Epithelial Cells Occasional /hpf      Bacteria, UA Occasional /hpf     CBC and differential [[id number]] Collected: 05/22/21 0837    Lab Status: Final result Specimen: Blood from Arm, Right Updated: 05/22/21 0848     WBC 8 06 Thousand/uL      RBC 4 28 Million/uL      Hemoglobin 11 9 g/dL      Hematocrit 36 5 %      MCV 85 fL      MCH 27 8 pg      MCHC 32 6 g/dL      RDW 14 9 %      MPV 11 2 fL      Platelets 557 Thousands/uL      nRBC 0 /100 WBCs      Neutrophils Relative 65 %      Immat GRANS % 0 %      Lymphocytes Relative 24 %      Monocytes Relative 7 %      Eosinophils Relative 4 %      Basophils Relative 0 %      Neutrophils Absolute 5 19 Thousands/µL      Immature Grans Absolute 0 01 Thousand/uL      Lymphocytes Absolute 1 96 Thousands/µL      Monocytes Absolute 0 56 Thousand/µL      Eosinophils Absolute 0 31 Thousand/µL      Basophils Absolute 0 03 Thousands/µL     Urine Macroscopic, POC [[id number]]  (Abnormal) Collected: 05/22/21 0734    Lab Status: Final result Specimen: Urine Updated: 05/22/21 0735     Color, UA Yellow     Clarity, UA Clear     pH, UA 7 0     Leukocytes, UA Small     Nitrite, UA Negative     Protein,  (2+) mg/dl      Glucose, UA Negative mg/dl      Ketones, UA Negative mg/dl      Urobilinogen, UA 1 0 E U /dl      Bilirubin, UA Negative     Blood, UA Negative     Specific Gravity, UA 1 015    Narrative:      CLINITEK RESULT               Urine Culture  5/17/21 >100,000 cfu/ml Enterobacter cloacae complexAbnormal     Multi-Drug Resistant Organism   Please note: This patient requires contact precautions        Susceptibility     Enterobacter cloacae complex     [place]     Amoxicillin + Clavulanate >16/8 ug/ml Resistant     Ampicillin ($$) >16 00 ug/ml Resistant     Ampicillin + Sulbactam ($) >16/8 ug/ml Resistant     Aztreonam ($$$)  16 ug/ml Intermediate     Cefazolin ($) >16 00 ug/ml Resistant     Cefepime ($) <=2 00 ug/ml Susceptible     Cefotaxime ($) >32 00 ug/ml Resistant     Ceftazidime ($$) 16 ug/ml Intermediate     Ceftriaxone ($$) >32 00 ug/ml Resistant     Cefuroxime ($$) >16 ug/ml Resistant     Ciprofloxacin ($)  <=1 00 ug/ml Susceptible     Ertapenem ($$$) <=0 5 ug/ml Susceptible     Gentamicin ($$) <=1 ug/ml Susceptible     Levofloxacin ($) <=0 25 ug/ml Susceptible     Nitrofurantoin >64 ug/ml Resistant     Piperacillin + Tazobactam ($$$) 8 ug/ml Susceptible     Tetracycline <=4 ug/ml Susceptible     Tobramycin ($) <=1 ug/ml Susceptible     Trimethoprim + Sulfamethoxazole ($$$) <=2/38 ug/ml Susceptible     ZID Performed Yes               CT renal stone study abdomen pelvis without contrast   Final Result by Bobby Puri MD (05/22 1023)   Nonobstructive left upper renal pole nephrolithiasis without further urinary calculi appreciated  Status post hernia repair with moderate recurrent hernia noted, stable  Workstation performed: [id number]               Procedures  Procedures      ED Course  ED Course as of May 22 1101   Sat May 22, 2021   5901 Per Dr Moises Winkler note on 4/12/24, Baseline creatinine: 1 5-2 0   Creatinine(!): 1 79        Patient presents with dysuria, urgency, frequency, back pain, malaise, and lower abdominal pressure which started yesterday  Recently had urine culture on 5/17/21 which resulted on 5/20 as positive for E  Cloacae MDRO  Was called with results on 5/20 and declined antibiotics as she was asymptomatic at that time and has since become symptomatic  WBC today is normal  CMP - creatinine is at baseline today, electrolytes WNL  UA revealed 4-10 WBCs and occasional bacteria, similar to UA on 5/17  Given history of kidney stones, a CT renal stone wo contrast was ordered and was notable for left upper pole nephrolithiasis, otherwise   Patient has remained afebrile   Given sensitivity results from recent urine culture, patient was treated with Cefepime in the ED  Patient is now stable for d/c home  As CrCl today is 27 9, will send Rx for renally dosed Bactrim single strength 1 tab BID for 7 days  Return to ER precautions reviewed  Patient is scheduled to for an appointment with her PCP on 5/26 and Nephrology on 5/25  Patient understands and agrees with plan of care  All questions and concerns addressed  SBIRT 20yo+      Most Recent Value   SBIRT (24 yo +)   In order to provide better care to our patients, we are screening all of our patients for alcohol and drug use  Would it be okay to ask you these screening questions? No Filed at: 05/22/2021 0746                MDM  Number of Diagnoses or Management Options  CKD (chronic kidney disease) stage 4, GFR 15-29 ml/min ([address] ): established and improving  UTI (urinary tract infection): new and requires workup     Amount and/or Complexity of Data Reviewed  Clinical lab tests: ordered and reviewed  Tests in the radiology section of CPT®: ordered and reviewed  Review and summarize past medical records: yes    Patient Progress  Patient progress: stable      Disposition  Final diagnoses:   UTI (urinary tract infection)   CKD (chronic kidney disease) stage 4, GFR 15-29 ml/min ([address] )     Time reflects when diagnosis was documented in both MDM as applicable and the Disposition within this note     Time User Action Codes Description Comment    5/22/2021  9:35 AM Liliana Arias Add [N39 0] UTI (urinary tract infection)     5/22/2021  9:36 AM Sarah Arias Add [N18 4] CKD (chronic kidney disease) stage 4, GFR 15-29 ml/min [facility])       ED Disposition     ED Disposition Condition Date/Time Comment    Discharge Stable Sat May 22, 2021 10:40 AM Buck Woods discharge to home/self care  Follow-up Information     Follow up With Specialties Details Why Contact Info    Zaida Tristan MD Family Medicine Go on 5/26/2021 for your previously scheduled appointment [facility]    [address]  [place] [address]  [phone]            Discharge Medication List as of 5/22/2021 10:42 AM      START taking these medications    Details   sulfamethoxazole-trimethoprim (BACTRIM) 400-80 mg per tablet Take 1 tablet by mouth every 12 (twelve) hours for 7 days, Starting Sat 5/22/2021, Until Sat 5/29/2021, Normal         CONTINUE these medications which have NOT CHANGED    Details   acetaminophen (TYLENOL) 500 mg tablet Take 1,000 mg by mouth as needed , Historical Med      albuterol (Ventolin HFA) 90 mcg/act inhaler Inhale 2 puffs every 6 (six) hours as needed for wheezing or shortness of breath, Starting Tue 5/5/2020, No Print      amLODIPine (NORVASC) 5 mg tablet TAKE 1 TABLET BY MOUTH EVERY DAY, Normal      b complex vitamins tablet Take 1 tablet by mouth daily , Historical Med      B-D ULTRAFINE III SHORT PEN 31G X 8 MM MISC USE AS DIRECTED 4 TIMES PER DAY, Historical Med      doxazosin (CARDURA) 2 mg tablet TAKE 1 TABLET (2 MG TOTAL) BY MOUTH DAILY AT BEDTIME, Starting Sat 2/6/2021, Normal      !! DULoxetine (Cymbalta) 30 mg delayed release capsule Take 30 mg by mouth daily, Starting Tue 3/2/2021, Until Wed 3/2/2022, Historical Med      !! DULoxetine (CYMBALTA) 60 mg delayed release capsule Take 90 mg by mouth daily, Historical Med      famotidine (PEPCID) 40 MG tablet Take 1 tablet (40 mg total) by mouth daily Take daily in am, Starting Fri 4/2/2021, Normal      fluticasone (FLONASE) 50 mcg/act nasal spray 1-2 sprays daily, Starting Wed 7/1/2020, Historical Med      gabapentin (NEURONTIN) 300 mg capsule Take 300 mg by mouth 2 (two) times a day , Starting Wed 6/17/2020, Historical Med      Icosapent Ethyl (Vascepa) 1 g CAPS Take 1 g by mouth 2 (two) times a day, Historical Med      insulin aspart (NovoLOG) 100 units/mL injection Inject under the skin 3 (three) times a day before meals 14 units, 14 units, 18 units  , Historical Med      insulin detemir (LEVEMIR) 100 units/mL subcutaneous injection Inject 32 Units under the skin daily at bedtime, Starting Tue 9/29/2020, No Print      levothyroxine 137 mcg tablet Take 137 mcg by mouth, Starting Tue 3/2/2021, Until Wed 3/2/2022, Historical Med      metoprolol tartrate (LOPRESSOR) 50 mg tablet Take 1 tablet (50 mg total) by mouth every 12 (twelve) hours, Starting Mon 4/26/2021, Normal      olmesartan (BENICAR) 40 mg tablet Take 1 tablet (40 mg total) by mouth daily, Starting Fri 2/5/2021, Normal      pantoprazole (PROTONIX) 20 mg tablet Take 1 tablet (20 mg total) by mouth daily, Starting Sun 4/4/2021, No Print      pramipexole (MIRAPEX) 0 25 mg tablet Take 1 tablet (0 25 mg total) by mouth daily, Starting Tue 11/24/2020, Normal      rosuvastatin (CRESTOR) 5 mg tablet TAKE 1 TABLET BY MOUTH EVERY DAY AT NIGHT, Historical Med      torsemide (DEMADEX) 20 mg tablet Take 1 tablet (20 mg total) by mouth daily, Starting Wed 8/5/2020, Normal      Vitamin D, Ergocalciferol, 2000 units CAPS Take 2,000 Units by mouth daily , Historical Med       !! - Potential duplicate medications found  Please discuss with provider  No discharge procedures on file  PDMP Review       Value Time User    PDMP Reviewed  Yes 5/17/2021  3:42 AM Lorena Lama MD           ED Provider  Attending physically available and evaluated Michael Murray I managed the patient along with the ED Attending      Electronically Signed by         Socorro Lamas DO  05/22/21 0009

## 2021-05-22 NOTE — DISCHARGE INSTRUCTIONS
Please take Bactrim 1 tablet every 12 hours for 7 days  Please follow up with your PCP and Dr Hossein Saldana at your scheduled appointments  Return to the ER if you have worsening symptoms or fever  Continue to stay well hydrated

## 2021-05-24 LAB
CREAT ?TM UR-SCNC: 71.4 UMOL/L
EXT MICROALBUMIN URINE RANDOM: 29.3
HBA1C MFR BLD HPLC: 6.8 %
MICROALBUMIN/CREAT UR: 410.4 MG/G{CREAT}

## 2021-05-24 NOTE — PROGRESS NOTES
RENAL FOLLOW UP NOTE: td    ASSESSMENT AND PLAN:  1   CKD stage 4 :  · Etiology:  Diabetic nephropathy/hypertensive nephrosclerosis/arteriolar nephrosclerosis/chronic NSAID use   No evidence of obstructive uropathy, renal artery disease or primary glomerular disease with a bland urinalysis  Of note, CPK was normal at 105 no evidence rhabdomyolysis    · Baseline creatinine:  1 5-2 0  · Current creatinine:  2 02 from 05/24/2021  · Urine protein creatinine ratio:  0 67 g at goal!!  · UA demonstrated no significant hematuria but 3+ proteinuria from  · Serologies:  Normal C3/C4; negative rheumatoid factor; negative SPEP:  Negative UPEP;  Light chain ratio 2 09 essentially normal for chronic kidney disease  negative EWELINA; negative hepatitis-C; normal IgA; normal ASO; negative RPR  Recommendations:  · Treat hypertension-please see below  · Treat dyslipidemia-please see below  · Maintain proteinuria less than 1 g or as low as possible  · Avoid nephrotoxic agents such as NSAIDs, patient counseled as such     2   Volume:   · Current status:  Euvolemic  · Torsemide dose:  Continue current dose  3   Hypertension:  Workup:  · saline suppression test for primary aldosterone state was normal  · plasma free metanephrines was negative  · renal artery duplex was negative 02/2019       Current blood pressure averages:   A m :  138/88 without orthostatic changes  P m :  131/80 changes  Heart rate:  60 range        · Goal blood pressure:  less than 130/80 given less than 1 g of proteinuria at this time  Recommendations:  · Push nonmedical regimen including weight loss, isotonic exercise and avoidance of salt; patient counseled as such  · Medication changes today:   · Increase doxazosin to 4 mg at bedtime  4   Electrolytes:  all acceptable including a potassium of 4 6: Please see below  5   Mineral bone disorder:  · Calcium/magnesium/phosphorus:  All acceptable  · PTH intact:  34 0 acceptable from prior visit  · Vitamin-D:  34 from prior visit  6   Dyslipidemia:  · Goal LDL:  Less than 100  · Current lipid profile:  LDL 34/HDL 39/triglycerides 354  Recommendations:  Per Endocrinology  7   Anemia:   · Current hemoglobin  12 1 essentially normal  8   Other problems:  · Depression  · Diabetes mellitus per primary medical physician  · Hypothyroidism  · BARBARA on CPAP  · Fibromyalgia/osteoarthritis:  Patient with myoclonic movements for a while concerned about the possibility of gabapentin will discuss with Rheumatology  · Nephrolithiasis  · Basal cell/squamous CA of the skin  · Urinary stress incontinence  · Status post incisional hernia repairs  · Recent hospitalization as outlined below from severe GERD with hypoxemia and shortness of breath from a failed Nissen fundoplication from prior hiatal hernia repair   Patient is due to have further testing with an EGD by GI and then subsequently thoracic surgery consultation for possible repair(however, according to the patient at this juncture she was felt I risk so no surgery is planned at this time)  In addition, she was placed on Protonix 40 b i d  and Pepcid 40 mg hs  · Hospitalization on 07/14/2020 secondary confusion at home   Apparently she had protracted hospital course in [place] following aspiration pneumonia   Ten days in the ICU on a ventilator   No overt infectious process   Low probability for pulmonary embolus despite an elevated D-dimer   Had mild leukocytosis/SIRS criteria subsequently discharged 1 day later when she clinically improved   Symptoms were felt secondary to her protracted ICU course  · Recent hospitalization with discharge on 05/18/21:  · Epigastric pain following an EGD on 05/13/2021 for Botox injection at the pylorus for treatment for gastroparesis    Apparently associated with chocolate ingestion and possible cough with aspiration  · Complicated by ANDRA with creatinine to 2 6 which improved with IV fluids TO 1 92 ON 05/18/2021    · Seen in the emergency room on 05/22/2021: For urinary frequency/urgency back pain malaise lower abdominal pressure urine culture on 05/17 resulting on 05/20 with E cloaca I, CT a renal stone protocol without contrast was notable for left upper pole nephrolithiasis otherwise negative  PATIENT TREATED WITH BACTRIM SINGLE STRENGTH 1 B  I D  FOR 7 DAYS:  WE WILL NEED TO WATCH HER LABS CLOSELY AND IF ANY CHANGE IN POTASSIUM OR CREATININE OF SIGNIFICANCE CONSIDERATION FOR SWITCHING TO QUINOLONE WHICH IS NOT IDEAL BUT NEXT BEST OPTION IN MULTIPLE RESISTANCE      GI health maintenance:  Last colonoscopy:  According to the patient less than 5 years ago  PATIENT INSTRUCTIONS:    Patient Instructions   1  Medication changes today:   Please increase doxazosin to 4 mg at bedtime which is 2-2 mg tablets  I did change the prescription in your drug store/pharmacy  2  Please go for non fasting  lab work over the next 1-2 days any time of the day    3  Please take 1 week a blood pressure readings  4-6 weeks after making the above medication change     AS FOLLOWS  MORNING AND EVENING, SITTING AND STANDING as follows:  · TAKE THE MORNING READINGS BEFORE ANY MEDICATIONS AND WHEN YOU ARE RELAXED FOR SEVERAL MINUTES  · TAKE THE EVENING READINGS:  BETWEEN 7-10 P M ; PRIOR TO ANY MEDICATIONS; AT LEAST IN OUR  FROM DINNER; AND CERTAINLY AFTER RELAXING FOR A FEW MINUTES  · PLEASE INCLUDE HEART RATE WITH YOUR BLOOD PRESSURE READINGS  · When taking standing readings, keep your arm supported at heart level and not dangling  · Make sure you are sitting with your back supported and feet on the ground and do not cross your legs or feet  · Make sure you have not taken any coffee or caffeine products or exercised or smoke cigarettes at least 30 minutes before taking your blood pressure  Then please mail these readings into the office    4   Follow-up in 3-4 months   Please bring in 1 week a blood pressure readings morning evening, sitting and standing is outlined above   PLEASE BRING AN YOUR BLOOD PRESSURE MACHINE TO CORRELATE WITH THE OFFICE MACHINE AT THIS NEXT SCHEDULED VISIT   Please go for fasting lab work 1-2 weeks prior to your appointment      5  General instructions:   AVOID SALT BUT NOT ADDING AN READING LABELS TO MAKE SURE THERE IS LOW-SALT IN THE FOOD THAT YOU ARE EATING  o Goal is less than 2 g of sodium intake or less than 5 g of sodium chloride intake per day     Avoid nonsteroidal anti-inflammatory drugs such as Naprosyn, ibuprofen, Aleve, Advil, Celebrex, Meloxicam (Mobic) etc   You can use Tylenol as needed if you do not have any liver condition to be concerned about     Avoid medications such as Sudafed or decongestants and antihistamines that contained the D component which is the decongestant  You can take antihistamines without the decongestant or D component   Try to avoid medications such as pantoprazole or  Protonix/Nexium or Esomeprazole)/Prilosec or omeprazole/Prevacid or lansoprazole/AcipHex or Rabeprazole  If you are able to, use Pepcid as this is safer for your kidneys   Try to exercise at least 30 minutes 3 days a week to begin with with an ultimate goal of 5 days a week for at least 30 minutes     Try to lose 5-10 lb by your next visit     Please do not drink more than 2 glasses of alcohol/wine on a daily basis as this may contribute to your high blood pressure  6  Please discuss with your family physician as will I, about your depression  7  I will discuss with your rheumatologist about adjusting the gabapentin which may be contributing to your muscle jerks          Subjective:   PLEASE SEE ASSESSMENT PLAN FOR RECENT HOSPITALIZATIONS  The patient overall is feeling better:  Less urine frequency, and no dysuria and no bloody urine, some bubbles unchanged    No significant flank pain at this time  No fevers, chills  Still has a cough which is less productive than it was  Good appetite and good energy  No gastrointestinal symptoms  No cardiovascular symptoms including swelling of the legs  No headaches, dizziness or lightheadedness  She did have Mohs procedure on her forehead 1 week ago and is going to undergo Mohs procedure for her nose  Blood pressure medications/renal pertinent medications:  · Torsemide 20 mg daily  · Olmesartan 40 mg daily at nighttime  · Metoprolol 50 twice a day  · Doxazosin 2 mg at bedtime  · Amlodipine 5 mg daily in the morning  · Bactrim single strength twice a day  · Gabapentin 300 b i d     ROS:  See HPI, otherwise review of systems as completely reviewed with the patient are negative    Past Medical History:   Diagnosis Date    Allergic     Anxiety     Arthritis     Aspiration into airway     Basal cell carcinoma 2007    left cheek     Chronic kidney disease 2000, 2018    Stones, kidney disease stage 3    Diabetes mellitus ([address] )     Disease of thyroid gland     Family history of thyroid problem     GERD (gastroesophageal reflux disease)     Heart burn     Hyperplasia, parathyroid ([address]     Hypertension     Kidney problem     Obesity (BMI 30 0-34  9)     Pneumonia     RLS (restless legs syndrome)     SCC (squamous cell carcinoma) 05/04/2021    left mid forehead    Seasonal allergies     Sleep apnea     uses CPAP    Squamous cell skin cancer 2007    left cheek     Swollen ankles      Past Surgical History:   Procedure Laterality Date    ARTHROSCOPY KNEE      BREAST BIOPSY      stereotactic-benign    BREAST BIOPSY      stereo-benign    BREAST EXCISIONAL BIOPSY      unknown date-benign    BREAST EXCISIONAL BIOPSY      unknown date-benign    BREAST EXCISIONAL BIOPSY      unknown date-benign    BREAST EXCISIONAL BIOPSY      unknown age-benign    CHOLECYSTECTOMY      HIATAL HERNIA REPAIR      LIPOSUCTION      MOHS SURGERY  05/20/2021    left mid forehead-Evan    REDUCTION MAMMAPLASTY Bilateral 2000    SKIN BIOPSY      SKIN CANCER EXCISION  2007    squamous cell carcinoma     SKIN CANCER EXCISION  2007    basal cell carcinoma    SQUAMOUS CELL CARCINOMA EXCISION      TOE SURGERY      TONSILLECTOMY      TUBAL LIGATION       Family History   Problem Relation Age of Onset    Heart disease Mother     Depression Mother     Hypertension Mother     COPD Mother     Hearing loss Mother     Heart disease Father     Lung cancer Father 79        Smoker     Cancer Father         brain    Alcohol abuse Father     Dementia Father     Hypertension Father     Thyroid disease Father     COPD Father     Arthritis Father     Brain cancer Father 76    Hypertension Sister     Diabetes Sister     Heart disease Sister     Thyroid disease Sister     Cancer Sister         Lympoma    Lung cancer Sister 78    Hypertension Brother     Diabetes Brother     Cancer Brother         Throat    Dementia Brother     Stroke Brother     Hypertension Brother     No Known Problems Son     No Known Problems Son     Heart disease Brother     Diabetes Brother     Brain cancer Paternal Aunt         unknown age      reports that she quit smoking about 45 years ago  Her smoking use included cigarettes  She started smoking about 53 years ago  She has a 20 00 pack-year smoking history  She has never used smokeless tobacco  She reports previous alcohol use  She reports that she does not use drugs  I COMPLETELY REVIEWED THE PAST MEDICAL HISTORY/PAST SURGICAL HISTORY/SOCIAL HISTORY/FAMILY HISTORY/AND MEDICATIONS  AND UPDATED ALL    Objective:     Vitals:   BP sitting on right:  146/70 with a heart rate of 60 and regular  BP standing on right:  130/70 with a heart rate of 60 and regular    Weight (last 2 days)     Date/Time   Weight    05/25/21 0846   77 6 (171)            Wt Readings from Last 3 Encounters:   05/25/21 77 6 kg (171 lb)   05/22/21 77 1 kg (170 lb)   05/20/21 78 kg (172 lb)       Body mass index is 30 29 kg/m²      Physical Exam: General:  Obese,No acute distress  Skin:  No acute rash  Eyes:  No scleral icterus, noninjected, no discharge from eyes  ENT:  Moist mucous membranes  Neck:  Supple, no jugular venous distention, trachea is midline, no lymphadenopathy and no thyromegaly  Back   No CVAT  Chest:  Clear to auscultation and percussion, good respiratory effort  CVS:  Regular rate and rhythm without a rub, or gallops or murmurs  Abdomen:  Obese,Soft and nontender with normal bowel sounds  Extremities:  No cyanosis and no edema, moderate arthritic changes, normal range of motion  Neuro:  Grossly intact  Psych:  Alert, oriented x3:Depressed about all of her recent and past hospitalizations      Medications:    Current Outpatient Medications:     acetaminophen (TYLENOL) 500 mg tablet, Take 1,000 mg by mouth as needed , Disp: , Rfl:     albuterol (Ventolin HFA) 90 mcg/act inhaler, Inhale 2 puffs every 6 (six) hours as needed for wheezing or shortness of breath, Disp: , Rfl: 0    amLODIPine (NORVASC) 5 mg tablet, TAKE 1 TABLET BY MOUTH EVERY DAY, Disp: 90 tablet, Rfl: 3    b complex vitamins tablet, Take 1 tablet by mouth daily , Disp: , Rfl:     B-D ULTRAFINE III SHORT PEN 31G X 8 MM MISC, USE AS DIRECTED 4 TIMES PER DAY, Disp: , Rfl: 3    doxazosin (CARDURA) 2 mg tablet, Take 2 tablets (4 mg total) by mouth daily at bedtime, Disp: 180 tablet, Rfl: 3    DULoxetine (Cymbalta) 30 mg delayed release capsule, Take 30 mg by mouth daily, Disp: , Rfl:     DULoxetine (CYMBALTA) 60 mg delayed release capsule, Take 90 mg by mouth daily, Disp: , Rfl:     famotidine (PEPCID) 40 MG tablet, Take 1 tablet (40 mg total) by mouth daily Take daily in am, Disp: 90 tablet, Rfl: 3    fluticasone (FLONASE) 50 mcg/act nasal spray, 1-2 sprays daily, Disp: , Rfl:     gabapentin (NEURONTIN) 300 mg capsule, Take 300 mg by mouth 2 (two) times a day , Disp: , Rfl:     Icosapent Ethyl (Vascepa) 1 g CAPS, Take 1 g by mouth 2 (two) times a day, Disp: , Rfl:     insulin aspart (NovoLOG) 100 units/mL injection, Inject under the skin 3 (three) times a day before meals 14 units, 14 units, 18 units  , Disp: , Rfl:     insulin detemir (LEVEMIR) 100 units/mL subcutaneous injection, Inject 32 Units under the skin daily at bedtime (Patient taking differently: Inject 40 Units under the skin daily at bedtime ), Disp: , Rfl: 0    levothyroxine 137 mcg tablet, Take 137 mcg by mouth, Disp: , Rfl:     metoprolol tartrate (LOPRESSOR) 50 mg tablet, Take 1 tablet (50 mg total) by mouth every 12 (twelve) hours, Disp: 180 tablet, Rfl: 3    olmesartan (BENICAR) 40 mg tablet, Take 1 tablet (40 mg total) by mouth daily, Disp: 90 tablet, Rfl: 0    pantoprazole (PROTONIX) 20 mg tablet, Take 1 tablet (20 mg total) by mouth daily, Disp: , Rfl:     pramipexole (MIRAPEX) 0 25 mg tablet, Take 1 tablet (0 25 mg total) by mouth daily, Disp: 90 tablet, Rfl: 3    rosuvastatin (CRESTOR) 5 mg tablet, TAKE 1 TABLET BY MOUTH EVERY DAY AT NIGHT, Disp: , Rfl:     sulfamethoxazole-trimethoprim (BACTRIM) 400-80 mg per tablet, Take 1 tablet by mouth every 12 (twelve) hours for 7 days, Disp: 14 tablet, Rfl: 0    torsemide (DEMADEX) 20 mg tablet, Take 1 tablet (20 mg total) by mouth daily, Disp: 90 tablet, Rfl: 3    Vitamin D, Ergocalciferol, 2000 units CAPS, Take 2,000 Units by mouth daily , Disp: , Rfl:     Lab, Imaging and other studies: I have personally reviewed pertinent labs    Laboratory Results:  Results for orders placed or performed during the hospital encounter of 05/22/21   Urine Microscopic   Result Value Ref Range    RBC, UA 0-1 None Seen, 0-1, 1-2, 2-4, 0-5 /hpf    WBC, UA 4-10 (A) None Seen, 0-1, 1-2, 0-5, 2-4 /hpf    Epithelial Cells Occasional None Seen, Occasional /hpf    Bacteria, UA Occasional None Seen, Occasional /hpf   CBC and differential   Result Value Ref Range    WBC 8 06 4 31 - 10 16 Thousand/uL    RBC 4 28 3 81 - 5 12 Million/uL    Hemoglobin 11 9 11 5 - 15 4 g/dL    Hematocrit 36 5 34 8 - 46 1 %    MCV 85 82 - 98 fL    MCH 27 8 26 8 - 34 3 pg    MCHC 32 6 31 4 - 37 4 g/dL    RDW 14 9 11 6 - 15 1 %    MPV 11 2 8 9 - 12 7 fL    Platelets 938 296 - 656 Thousands/uL    nRBC 0 /100 WBCs    Neutrophils Relative 65 43 - 75 %    Immat GRANS % 0 0 - 2 %    Lymphocytes Relative 24 14 - 44 %    Monocytes Relative 7 4 - 12 %    Eosinophils Relative 4 0 - 6 %    Basophils Relative 0 0 - 1 %    Neutrophils Absolute 5 19 1 85 - 7 62 Thousands/µL    Immature Grans Absolute 0 01 0 00 - 0 20 Thousand/uL    Lymphocytes Absolute 1 96 0 60 - 4 47 Thousands/µL    Monocytes Absolute 0 56 0 17 - 1 22 Thousand/µL    Eosinophils Absolute 0 31 0 00 - 0 61 Thousand/µL    Basophils Absolute 0 03 0 00 - 0 10 Thousands/µL   Comprehensive metabolic panel   Result Value Ref Range    Sodium 143 136 - 145 mmol/L    Potassium 4 9 3 5 - 5 3 mmol/L    Chloride 101 100 - 108 mmol/L    CO2 31 21 - 32 mmol/L    ANION GAP 11 4 - 13 mmol/L    BUN 36 (H) 5 - 25 mg/dL    Creatinine 1 79 (H) 0 60 - 1 30 mg/dL    Glucose 153 (H) 65 - 140 mg/dL    Calcium 9 1 8 3 - 10 1 mg/dL    Corrected Calcium 9 9 8 3 - 10 1 mg/dL    AST 40 5 - 45 U/L    ALT 35 12 - 78 U/L    Alkaline Phosphatase 94 46 - 116 U/L    Total Protein 7 1 6 4 - 8 2 g/dL    Albumin 3 0 (L) 3 5 - 5 0 g/dL    Total Bilirubin 0 42 0 20 - 1 00 mg/dL    eGFR 28 ml/min/1 73sq m   Urine Macroscopic, POC   Result Value Ref Range    Color, UA Yellow     Clarity, UA Clear     pH, UA 7 0 4 5 - 8 0    Leukocytes, UA Small (A) Negative    Nitrite, UA Negative Negative    Protein,  (2+) (A) Negative mg/dl    Glucose, UA Negative Negative mg/dl    Ketones, UA Negative Negative mg/dl    Urobilinogen, UA 1 0 0 2, 1 0 E U /dl E U /dl    Bilirubin, UA Negative Negative    Blood, UA Negative Negative    Specific Gravity, UA 1 015 1 003 - 1 030       Results from last 7 days   Lab Units 05/22/21  0837   WBC Thousand/uL 8 06   HEMOGLOBIN g/dL 11 9   HEMATOCRIT % 36 5   PLATELETS Thousands/uL 241   POTASSIUM mmol/L 4 9   CHLORIDE mmol/L 101   CO2 mmol/L 31   BUN mg/dL 36*   CREATININE mg/dL 1 79*   CALCIUM mg/dL 9 1         Radiology review:   chest X-ray    Ultrasound      Portions of the record may have been created with voice recognition software  Occasional wrong word or "sound a like" substitutions may have occurred due to the inherent limitations of voice recognition software  Read the chart carefully and recognize, using context, where substitutions have occurred

## 2021-05-25 ENCOUNTER — OFFICE VISIT (OUTPATIENT)
Dept: NEPHROLOGY | Facility: CLINIC | Age: 71
End: 2021-05-25
Payer: MEDICARE

## 2021-05-25 VITALS — WEIGHT: 171 LBS | HEIGHT: 63 IN | BODY MASS INDEX: 30.3 KG/M2

## 2021-05-25 DIAGNOSIS — D63.1 ANEMIA IN STAGE 4 CHRONIC KIDNEY DISEASE (HCC): ICD-10-CM

## 2021-05-25 DIAGNOSIS — R80.1 PERSISTENT PROTEINURIA: ICD-10-CM

## 2021-05-25 DIAGNOSIS — N18.4 ANEMIA IN STAGE 4 CHRONIC KIDNEY DISEASE (HCC): ICD-10-CM

## 2021-05-25 DIAGNOSIS — Z79.4 TYPE 2 DIABETES MELLITUS WITH DIABETIC NEPHROPATHY, WITH LONG-TERM CURRENT USE OF INSULIN (HCC): ICD-10-CM

## 2021-05-25 DIAGNOSIS — N18.4 STAGE 4 CHRONIC KIDNEY DISEASE (HCC): ICD-10-CM

## 2021-05-25 DIAGNOSIS — E55.9 VITAMIN D DEFICIENCY: ICD-10-CM

## 2021-05-25 DIAGNOSIS — E11.21 TYPE 2 DIABETES MELLITUS WITH DIABETIC NEPHROPATHY, WITH LONG-TERM CURRENT USE OF INSULIN (HCC): ICD-10-CM

## 2021-05-25 DIAGNOSIS — N18.30 BENIGN HYPERTENSION WITH CKD (CHRONIC KIDNEY DISEASE) STAGE III (HCC): Primary | ICD-10-CM

## 2021-05-25 DIAGNOSIS — I12.9 BENIGN HYPERTENSION WITH CKD (CHRONIC KIDNEY DISEASE) STAGE III (HCC): Primary | ICD-10-CM

## 2021-05-25 DIAGNOSIS — Z87.442 HISTORY OF KIDNEY STONES: ICD-10-CM

## 2021-05-25 DIAGNOSIS — I12.9 HYPERTENSIVE CHRONIC KIDNEY DISEASE WITH STAGE 1 THROUGH STAGE 4 CHRONIC KIDNEY DISEASE, OR UNSPECIFIED CHRONIC KIDNEY DISEASE: ICD-10-CM

## 2021-05-25 PROCEDURE — 99214 OFFICE O/P EST MOD 30 MIN: CPT | Performed by: INTERNAL MEDICINE

## 2021-05-25 RX ORDER — DOXAZOSIN 2 MG/1
4 TABLET ORAL
Qty: 180 TABLET | Refills: 3 | Status: SHIPPED | OUTPATIENT
Start: 2021-05-25 | End: 2021-08-19

## 2021-05-25 NOTE — LETTER
May 25, 2021     [facility]  [address]  [facility]    Patient: Lucille Segal   YOB: 1950   Date of Visit: 5/25/2021       Dear Dr Hi Dad: Thank you for referring Lora Lorenzo to me for evaluation  Below are my notes for this consultation  If you have questions, please do not hesitate to call me  I look forward to following your patient along with you  Sincerely,        Sammy Matthew MD        CC: MD Yin Quiroz MD Harry Spine, MD  5/25/2021  9:20 AM  Sign when Signing Visit  RENAL FOLLOW UP NOTE: td    ASSESSMENT AND PLAN:  1   CKD stage 4 :  · Etiology:  Diabetic nephropathy/hypertensive nephrosclerosis/arteriolar nephrosclerosis/chronic NSAID use   No evidence of obstructive uropathy, renal artery disease or primary glomerular disease with a bland urinalysis  Of note, CPK was normal at 105 no evidence rhabdomyolysis    · Baseline creatinine:  1 5-2 0  · Current creatinine:  2 02 from 05/24/2021  · Urine protein creatinine ratio:  0 67 g at goal!!  · UA demonstrated no significant hematuria but 3+ proteinuria from  · Serologies:  Normal C3/C4; negative rheumatoid factor; negative SPEP:  Negative UPEP;  Light chain ratio 2 09 essentially normal for chronic kidney disease  negative EWELINA; negative hepatitis-C; normal IgA; normal ASO; negative RPR  Recommendations:  · Treat hypertension-please see below  · Treat dyslipidemia-please see below  · Maintain proteinuria less than 1 g or as low as possible  · Avoid nephrotoxic agents such as NSAIDs, patient counseled as such     2   Volume:   · Current status:  Euvolemic  · Torsemide dose:  Continue current dose  3   Hypertension:  Workup:  · saline suppression test for primary aldosterone state was normal  · plasma free metanephrines was negative  · renal artery duplex was negative 02/2019       Current blood pressure averages:   A m :  138/88 without orthostatic changes  P m :  131/80 changes  Heart rate:  60 range        · Goal blood pressure:  less than 130/80 given less than 1 g of proteinuria at this time  Recommendations:  · Push nonmedical regimen including weight loss, isotonic exercise and avoidance of salt; patient counseled as such  · Medication changes today:   · Increase doxazosin to 4 mg at bedtime  4   Electrolytes:  all acceptable including a potassium of 4 6: Please see below  5   Mineral bone disorder:  · Calcium/magnesium/phosphorus:  All acceptable  · PTH intact:  34 0 acceptable from prior visit  · Vitamin-D:  34 from prior visit  6   Dyslipidemia:  · Goal LDL:  Less than 100  · Current lipid profile:  LDL 34/HDL 39/triglycerides 354  Recommendations:  Per Endocrinology  7   Anemia:   · Current hemoglobin  12 1 essentially normal  8   Other problems:  · Depression  · Diabetes mellitus per primary medical physician  · Hypothyroidism  · BARBARA on CPAP  · Fibromyalgia/osteoarthritis:  Patient with myoclonic movements for a while concerned about the possibility of gabapentin will discuss with Rheumatology  · Nephrolithiasis  · Basal cell/squamous CA of the skin  · Urinary stress incontinence  · Status post incisional hernia repairs  · Recent hospitalization as outlined below from severe GERD with hypoxemia and shortness of breath from a failed Nissen fundoplication from prior hiatal hernia repair   Patient is due to have further testing with an EGD by GI and then subsequently thoracic surgery consultation for possible repair(however, according to the patient at this juncture she was felt I risk so no surgery is planned at this time)  In addition, she was placed on Protonix 40 b i d  and Pepcid 40 mg hs  · Hospitalization on 07/14/2020 secondary confusion at home   Apparently she had protracted hospital course in [place] following aspiration pneumonia   Ten days in the ICU on a ventilator   No overt infectious process   Low probability for pulmonary embolus despite an elevated D-dimer   Had mild leukocytosis/SIRS criteria subsequently discharged 1 day later when she clinically improved   Symptoms were felt secondary to her protracted ICU course  · Recent hospitalization with discharge on 05/18/21:  · Epigastric pain following an EGD on 05/13/2021 for Botox injection at the pylorus for treatment for gastroparesis  Apparently associated with chocolate ingestion and possible cough with aspiration  · Complicated by ANDRA with creatinine to 2 6 which improved with IV fluids TO 1 92 ON 05/18/2021    · Seen in the emergency room on 05/22/2021: For urinary frequency/urgency back pain malaise lower abdominal pressure urine culture on 05/17 resulting on 05/20 with E cloaca I, CT a renal stone protocol without contrast was notable for left upper pole nephrolithiasis otherwise negative  PATIENT TREATED WITH BACTRIM SINGLE STRENGTH 1 B  I D  FOR 7 DAYS:  WE WILL NEED TO WATCH HER LABS CLOSELY AND IF ANY CHANGE IN POTASSIUM OR CREATININE OF SIGNIFICANCE CONSIDERATION FOR SWITCHING TO QUINOLONE WHICH IS NOT IDEAL BUT NEXT BEST OPTION IN MULTIPLE RESISTANCE ***     GI health maintenance:  Last colonoscopy:  According to the patient less than 5 years ago  PATIENT INSTRUCTIONS:    Patient Instructions   1  Medication changes today:   Please increase doxazosin to 4 mg at bedtime which is 2-2 mg tablets  I did change the prescription in your drug store/pharmacy  2  Please go for non fasting  lab work over the next 1-2 days any time of the day    3   Please take 1 week a blood pressure readings  4-6 weeks after making the above medication change     AS FOLLOWS  MORNING AND EVENING, SITTING AND STANDING as follows:  · TAKE THE MORNING READINGS BEFORE ANY MEDICATIONS AND WHEN YOU ARE RELAXED FOR SEVERAL MINUTES  · TAKE THE EVENING READINGS:  BETWEEN 7-10 P M ; PRIOR TO ANY MEDICATIONS; AT LEAST IN OUR  FROM DINNER; AND CERTAINLY AFTER RELAXING FOR A FEW MINUTES  · PLEASE INCLUDE HEART RATE WITH YOUR BLOOD PRESSURE READINGS  · When taking standing readings, keep your arm supported at heart level and not dangling  · Make sure you are sitting with your back supported and feet on the ground and do not cross your legs or feet  · Make sure you have not taken any coffee or caffeine products or exercised or smoke cigarettes at least 30 minutes before taking your blood pressure  Then please mail these readings into the office    4  Follow-up in 3-4 months   Please bring in 1 week a blood pressure readings morning evening, sitting and standing is outlined above   PLEASE BRING AN YOUR BLOOD PRESSURE MACHINE TO CORRELATE WITH THE OFFICE MACHINE AT THIS NEXT SCHEDULED VISIT   Please go for fasting lab work 1-2 weeks prior to your appointment      5  General instructions:   AVOID SALT BUT NOT ADDING AN READING LABELS TO MAKE SURE THERE IS LOW-SALT IN THE FOOD THAT YOU ARE EATING  o Goal is less than 2 g of sodium intake or less than 5 g of sodium chloride intake per day     Avoid nonsteroidal anti-inflammatory drugs such as Naprosyn, ibuprofen, Aleve, Advil, Celebrex, Meloxicam (Mobic) etc   You can use Tylenol as needed if you do not have any liver condition to be concerned about     Avoid medications such as Sudafed or decongestants and antihistamines that contained the D component which is the decongestant  You can take antihistamines without the decongestant or D component   Try to avoid medications such as pantoprazole or  Protonix/Nexium or Esomeprazole)/Prilosec or omeprazole/Prevacid or lansoprazole/AcipHex or Rabeprazole  If you are able to, use Pepcid as this is safer for your kidneys       Try to exercise at least 30 minutes 3 days a week to begin with with an ultimate goal of 5 days a week for at least 30 minutes     Try to lose 5-10 lb by your next visit     Please do not drink more than 2 glasses of alcohol/wine on a daily basis as this may contribute to your high blood pressure  6  Please discuss with your family physician as will I, about your depression  7  I will discuss with your rheumatologist about adjusting the gabapentin which may be contributing to your muscle jerks          Subjective:   PLEASE SEE ASSESSMENT PLAN FOR RECENT HOSPITALIZATIONS  The patient overall is feeling better:  Less urine frequency, and no dysuria and no bloody urine, some bubbles unchanged  No significant flank pain at this time  No fevers, chills  Still has a cough which is less productive than it was  Good appetite and good energy  No gastrointestinal symptoms  No cardiovascular symptoms including swelling of the legs  No headaches, dizziness or lightheadedness  She did have Mohs procedure on her forehead 1 week ago and is going to undergo Mohs procedure for her nose  Blood pressure medications/renal pertinent medications:  · Torsemide 20 mg daily  · Olmesartan 40 mg daily at nighttime  · Metoprolol 50 twice a day  · Doxazosin 2 mg at bedtime  · Amlodipine 5 mg daily in the morning  · Bactrim single strength twice a day  · Gabapentin 300 b i d     ROS:  See HPI, otherwise review of systems as completely reviewed with the patient are negative    Past Medical History:   Diagnosis Date    Allergic     Anxiety     Arthritis     Aspiration into airway     Basal cell carcinoma 2007    left cheek     Chronic kidney disease 2000, 2018    Stones, kidney disease stage 3    Diabetes mellitus ([address] )     Disease of thyroid gland     Family history of thyroid problem     GERD (gastroesophageal reflux disease)     Heart burn     Hyperplasia, parathyroid ([address] )     Hypertension     Kidney problem     Obesity (BMI 30 0-34  9)     Pneumonia     RLS (restless legs syndrome)     SCC (squamous cell carcinoma) 05/04/2021    left mid forehead    Seasonal allergies     Sleep apnea     uses CPAP    Squamous cell skin cancer 2007    left cheek     Swollen ankles      Past Surgical History:   Procedure Laterality Date    ARTHROSCOPY KNEE      BREAST BIOPSY      stereotactic-benign    BREAST BIOPSY      stereo-benign    BREAST EXCISIONAL BIOPSY      unknown date-benign    BREAST EXCISIONAL BIOPSY      unknown date-benign    BREAST EXCISIONAL BIOPSY      unknown date-benign    BREAST EXCISIONAL BIOPSY      unknown age-benign    CHOLECYSTECTOMY      HIATAL HERNIA REPAIR      LIPOSUCTION      MOHS SURGERY  05/20/2021    left mid forehead-Evan    REDUCTION MAMMAPLASTY Bilateral 2000    SKIN BIOPSY      SKIN CANCER EXCISION  2007    squamous cell carcinoma     SKIN CANCER EXCISION  2007    basal cell carcinoma    SQUAMOUS CELL CARCINOMA EXCISION      TOE SURGERY      TONSILLECTOMY      TUBAL LIGATION       Family History   Problem Relation Age of Onset    Heart disease Mother     Depression Mother     Hypertension Mother     COPD Mother     Hearing loss Mother     Heart disease Father     Lung cancer Father 79        Smoker     Cancer Father         brain    Alcohol abuse Father     Dementia Father     Hypertension Father     Thyroid disease Father     COPD Father     Arthritis Father     Brain cancer Father 76    Hypertension Sister     Diabetes Sister     Heart disease Sister     Thyroid disease Sister     Cancer Sister         Lympoma    Lung cancer Sister 78    Hypertension Brother     Diabetes Brother     Cancer Brother         Throat    Dementia Brother     Stroke Brother     Hypertension Brother     No Known Problems Son     No Known Problems Son     Heart disease Brother     Diabetes Brother     Brain cancer Paternal Aunt         unknown age      reports that she quit smoking about 45 years ago  Her smoking use included cigarettes  She started smoking about 53 years ago  She has a 20 00 pack-year smoking history  She has never used smokeless tobacco  She reports previous alcohol use  She reports that she does not use drugs      I COMPLETELY REVIEWED THE PAST MEDICAL HISTORY/PAST SURGICAL HISTORY/SOCIAL HISTORY/FAMILY HISTORY/AND MEDICATIONS  AND UPDATED ALL    Objective:     Vitals:   BP sitting on right:  146/70 with a heart rate of 60 and regular  BP standing on right:  130/70 with a heart rate of 60 and regular    Weight (last 2 days)     Date/Time   Weight    05/25/21 0846   77 6 (171)            Wt Readings from Last 3 Encounters:   05/25/21 77 6 kg (171 lb)   05/22/21 77 1 kg (170 lb)   05/20/21 78 kg (172 lb)       Body mass index is 30 29 kg/m²      Physical Exam: General:  Obese,No acute distress  Skin:  No acute rash  Eyes:  No scleral icterus, noninjected, no discharge from eyes  ENT:  Moist mucous membranes  Neck:  Supple, no jugular venous distention, trachea is midline, no lymphadenopathy and no thyromegaly  Back   No CVAT  Chest:  Clear to auscultation and percussion, good respiratory effort  CVS:  Regular rate and rhythm without a rub, or gallops or murmurs  Abdomen:  Obese,Soft and nontender with normal bowel sounds  Extremities:  No cyanosis and no edema, moderate arthritic changes, normal range of motion  Neuro:  Grossly intact  Psych:  Alert, oriented x3:Depressed about all of her recent and past hospitalizations      Medications:    Current Outpatient Medications:     acetaminophen (TYLENOL) 500 mg tablet, Take 1,000 mg by mouth as needed , Disp: , Rfl:     albuterol (Ventolin HFA) 90 mcg/act inhaler, Inhale 2 puffs every 6 (six) hours as needed for wheezing or shortness of breath, Disp: , Rfl: 0    amLODIPine (NORVASC) 5 mg tablet, TAKE 1 TABLET BY MOUTH EVERY DAY, Disp: 90 tablet, Rfl: 3    b complex vitamins tablet, Take 1 tablet by mouth daily , Disp: , Rfl:     B-D ULTRAFINE III SHORT PEN 31G X 8 MM MISC, USE AS DIRECTED 4 TIMES PER DAY, Disp: , Rfl: 3    doxazosin (CARDURA) 2 mg tablet, Take 2 tablets (4 mg total) by mouth daily at bedtime, Disp: 180 tablet, Rfl: 3    DULoxetine (Cymbalta) 30 mg delayed release capsule, Take 30 mg by mouth daily, Disp: , Rfl:     DULoxetine (CYMBALTA) 60 mg delayed release capsule, Take 90 mg by mouth daily, Disp: , Rfl:     famotidine (PEPCID) 40 MG tablet, Take 1 tablet (40 mg total) by mouth daily Take daily in am, Disp: 90 tablet, Rfl: 3    fluticasone (FLONASE) 50 mcg/act nasal spray, 1-2 sprays daily, Disp: , Rfl:     gabapentin (NEURONTIN) 300 mg capsule, Take 300 mg by mouth 2 (two) times a day , Disp: , Rfl:     Icosapent Ethyl (Vascepa) 1 g CAPS, Take 1 g by mouth 2 (two) times a day, Disp: , Rfl:     insulin aspart (NovoLOG) 100 units/mL injection, Inject under the skin 3 (three) times a day before meals 14 units, 14 units, 18 units  , Disp: , Rfl:     insulin detemir (LEVEMIR) 100 units/mL subcutaneous injection, Inject 32 Units under the skin daily at bedtime (Patient taking differently: Inject 40 Units under the skin daily at bedtime ), Disp: , Rfl: 0    levothyroxine 137 mcg tablet, Take 137 mcg by mouth, Disp: , Rfl:     metoprolol tartrate (LOPRESSOR) 50 mg tablet, Take 1 tablet (50 mg total) by mouth every 12 (twelve) hours, Disp: 180 tablet, Rfl: 3    olmesartan (BENICAR) 40 mg tablet, Take 1 tablet (40 mg total) by mouth daily, Disp: 90 tablet, Rfl: 0    pantoprazole (PROTONIX) 20 mg tablet, Take 1 tablet (20 mg total) by mouth daily, Disp: , Rfl:     pramipexole (MIRAPEX) 0 25 mg tablet, Take 1 tablet (0 25 mg total) by mouth daily, Disp: 90 tablet, Rfl: 3    rosuvastatin (CRESTOR) 5 mg tablet, TAKE 1 TABLET BY MOUTH EVERY DAY AT NIGHT, Disp: , Rfl:     sulfamethoxazole-trimethoprim (BACTRIM) 400-80 mg per tablet, Take 1 tablet by mouth every 12 (twelve) hours for 7 days, Disp: 14 tablet, Rfl: 0    torsemide (DEMADEX) 20 mg tablet, Take 1 tablet (20 mg total) by mouth daily, Disp: 90 tablet, Rfl: 3    Vitamin D, Ergocalciferol, 2000 units CAPS, Take 2,000 Units by mouth daily , Disp: , Rfl:     Lab, Imaging and other studies: I have personally reviewed pertinent labs    Laboratory Results:  Results for orders placed or performed during the hospital encounter of 05/22/21   Urine Microscopic   Result Value Ref Range    RBC, UA 0-1 None Seen, 0-1, 1-2, 2-4, 0-5 /hpf    WBC, UA 4-10 (A) None Seen, 0-1, 1-2, 0-5, 2-4 /hpf    Epithelial Cells Occasional None Seen, Occasional /hpf    Bacteria, UA Occasional None Seen, Occasional /hpf   CBC and differential   Result Value Ref Range    WBC 8 06 4 31 - 10 16 Thousand/uL    RBC 4 28 3 81 - 5 12 Million/uL    Hemoglobin 11 9 11 5 - 15 4 g/dL    Hematocrit 36 5 34 8 - 46 1 %    MCV 85 82 - 98 fL    MCH 27 8 26 8 - 34 3 pg    MCHC 32 6 31 4 - 37 4 g/dL    RDW 14 9 11 6 - 15 1 %    MPV 11 2 8 9 - 12 7 fL    Platelets 967 603 - 720 Thousands/uL    nRBC 0 /100 WBCs    Neutrophils Relative 65 43 - 75 %    Immat GRANS % 0 0 - 2 %    Lymphocytes Relative 24 14 - 44 %    Monocytes Relative 7 4 - 12 %    Eosinophils Relative 4 0 - 6 %    Basophils Relative 0 0 - 1 %    Neutrophils Absolute 5 19 1 85 - 7 62 Thousands/µL    Immature Grans Absolute 0 01 0 00 - 0 20 Thousand/uL    Lymphocytes Absolute 1 96 0 60 - 4 47 Thousands/µL    Monocytes Absolute 0 56 0 17 - 1 22 Thousand/µL    Eosinophils Absolute 0 31 0 00 - 0 61 Thousand/µL    Basophils Absolute 0 03 0 00 - 0 10 Thousands/µL   Comprehensive metabolic panel   Result Value Ref Range    Sodium 143 136 - 145 mmol/L    Potassium 4 9 3 5 - 5 3 mmol/L    Chloride 101 100 - 108 mmol/L    CO2 31 21 - 32 mmol/L    ANION GAP 11 4 - 13 mmol/L    BUN 36 (H) 5 - 25 mg/dL    Creatinine 1 79 (H) 0 60 - 1 30 mg/dL    Glucose 153 (H) 65 - 140 mg/dL    Calcium 9 1 8 3 - 10 1 mg/dL    Corrected Calcium 9 9 8 3 - 10 1 mg/dL    AST 40 5 - 45 U/L    ALT 35 12 - 78 U/L    Alkaline Phosphatase 94 46 - 116 U/L    Total Protein 7 1 6 4 - 8 2 g/dL    Albumin 3 0 (L) 3 5 - 5 0 g/dL    Total Bilirubin 0 42 0 20 - 1 00 mg/dL    eGFR 28 ml/min/1 73sq m   Urine Macroscopic, POC   Result Value Ref Range Color, UA Yellow     Clarity, UA Clear     pH, UA 7 0 4 5 - 8 0    Leukocytes, UA Small (A) Negative    Nitrite, UA Negative Negative    Protein,  (2+) (A) Negative mg/dl    Glucose, UA Negative Negative mg/dl    Ketones, UA Negative Negative mg/dl    Urobilinogen, UA 1 0 0 2, 1 0 E U /dl E U /dl    Bilirubin, UA Negative Negative    Blood, UA Negative Negative    Specific Gravity, UA 1 015 1 003 - 1 030       Results from last 7 days   Lab Units 05/22/21  0837   WBC Thousand/uL 8 06   HEMOGLOBIN g/dL 11 9   HEMATOCRIT % 36 5   PLATELETS Thousands/uL 241   POTASSIUM mmol/L 4 9   CHLORIDE mmol/L 101   CO2 mmol/L 31   BUN mg/dL 36*   CREATININE mg/dL 1 79*   CALCIUM mg/dL 9 1         Radiology review:   chest X-ray    Ultrasound      Portions of the record may have been created with voice recognition software  Occasional wrong word or "sound a like" substitutions may have occurred due to the inherent limitations of voice recognition software  Read the chart carefully and recognize, using context, where substitutions have occurred

## 2021-05-25 NOTE — LETTER
May 25, 2021     MD [address]  [address]  [facility]    Patient: Yves Severin   YOB: 1950   Date of Visit: 5/25/2021       Dear Dr Iwona Bowen: Thank you for referring eGovanny Childress to me for evaluation  Below are my notes for this consultation  If you have questions, please do not hesitate to call me  I look forward to following your patient along with you  Sincerely,        Hanny Alegre MD        CC: Kaley Simental MD  [facility] MD Hanny Nina MD  5/25/2021  9:22 AM  Sign when Signing Visit  RENAL FOLLOW UP NOTE: td    ASSESSMENT AND PLAN:  1   CKD stage 4 :  · Etiology:  Diabetic nephropathy/hypertensive nephrosclerosis/arteriolar nephrosclerosis/chronic NSAID use   No evidence of obstructive uropathy, renal artery disease or primary glomerular disease with a bland urinalysis  Of note, CPK was normal at 105 no evidence rhabdomyolysis    · Baseline creatinine:  1 5-2 0  · Current creatinine:  2 02 from 05/24/2021  · Urine protein creatinine ratio:  0 67 g at goal!!  · UA demonstrated no significant hematuria but 3+ proteinuria from  · Serologies:  Normal C3/C4; negative rheumatoid factor; negative SPEP:  Negative UPEP;  Light chain ratio 2 09 essentially normal for chronic kidney disease  negative EWELINA; negative hepatitis-C; normal IgA; normal ASO; negative RPR  Recommendations:  · Treat hypertension-please see below  · Treat dyslipidemia-please see below  · Maintain proteinuria less than 1 g or as low as possible  · Avoid nephrotoxic agents such as NSAIDs, patient counseled as such     2   Volume:   · Current status:  Euvolemic  · Torsemide dose:  Continue current dose  3   Hypertension:  Workup:  · saline suppression test for primary aldosterone state was normal  · plasma free metanephrines was negative  · renal artery duplex was negative 02/2019       Current blood pressure averages:   A m :  138/88 without orthostatic changes  P m :  131/80 changes  Heart rate:  60 range        · Goal blood pressure:  less than 130/80 given less than 1 g of proteinuria at this time  Recommendations:  · Push nonmedical regimen including weight loss, isotonic exercise and avoidance of salt; patient counseled as such  · Medication changes today:   · Increase doxazosin to 4 mg at bedtime  4   Electrolytes:  all acceptable including a potassium of 4 6: Please see below  5   Mineral bone disorder:  · Calcium/magnesium/phosphorus:  All acceptable  · PTH intact:  34 0 acceptable from prior visit  · Vitamin-D:  34 from prior visit  6   Dyslipidemia:  · Goal LDL:  Less than 100  · Current lipid profile:  LDL 34/HDL 39/triglycerides 354  Recommendations:  Per Endocrinology  7   Anemia:   · Current hemoglobin  12 1 essentially normal  8   Other problems:  · Depression  · Diabetes mellitus per primary medical physician  · Hypothyroidism  · BARBARA on CPAP  · Fibromyalgia/osteoarthritis:  Patient with myoclonic movements for a while concerned about the possibility of gabapentin will discuss with Rheumatology  · Nephrolithiasis  · Basal cell/squamous CA of the skin  · Urinary stress incontinence  · Status post incisional hernia repairs  · Recent hospitalization as outlined below from severe GERD with hypoxemia and shortness of breath from a failed Nissen fundoplication from prior hiatal hernia repair   Patient is due to have further testing with an EGD by GI and then subsequently thoracic surgery consultation for possible repair(however, according to the patient at this juncture she was felt I risk so no surgery is planned at this time)  In addition, she was placed on Protonix 40 b i d  and Pepcid 40 mg hs  · Hospitalization on 07/14/2020 secondary confusion at home   Apparently she had protracted hospital course in [place] following aspiration pneumonia   Ten days in the ICU on a ventilator   No overt infectious process   Low probability for pulmonary embolus despite an elevated D-dimer   Had mild leukocytosis/SIRS criteria subsequently discharged 1 day later when she clinically improved   Symptoms were felt secondary to her protracted ICU course  · Recent hospitalization with discharge on 05/18/21:  · Epigastric pain following an EGD on 05/13/2021 for Botox injection at the pylorus for treatment for gastroparesis  Apparently associated with chocolate ingestion and possible cough with aspiration  · Complicated by ANDRA with creatinine to 2 6 which improved with IV fluids TO 1 92 ON 05/18/2021    · Seen in the emergency room on 05/22/2021: For urinary frequency/urgency back pain malaise lower abdominal pressure urine culture on 05/17 resulting on 05/20 with E cloaca I, CT a renal stone protocol without contrast was notable for left upper pole nephrolithiasis otherwise negative  PATIENT TREATED WITH BACTRIM SINGLE STRENGTH 1 B  I D  FOR 7 DAYS:  WE WILL NEED TO WATCH HER LABS CLOSELY AND IF ANY CHANGE IN POTASSIUM OR CREATININE OF SIGNIFICANCE CONSIDERATION FOR SWITCHING TO QUINOLONE WHICH IS NOT IDEAL BUT NEXT BEST OPTION IN MULTIPLE RESISTANCE      GI health maintenance:  Last colonoscopy:  According to the patient less than 5 years ago  PATIENT INSTRUCTIONS:    Patient Instructions   1  Medication changes today:   Please increase doxazosin to 4 mg at bedtime which is 2-2 mg tablets  I did change the prescription in your drug store/pharmacy  2  Please go for non fasting  lab work over the next 1-2 days any time of the day    3   Please take 1 week a blood pressure readings  4-6 weeks after making the above medication change     AS FOLLOWS  MORNING AND EVENING, SITTING AND STANDING as follows:  · TAKE THE MORNING READINGS BEFORE ANY MEDICATIONS AND WHEN YOU ARE RELAXED FOR SEVERAL MINUTES  · TAKE THE EVENING READINGS:  BETWEEN 7-10 P M ; PRIOR TO ANY MEDICATIONS; AT LEAST IN OUR  FROM DINNER; AND CERTAINLY AFTER RELAXING FOR A FEW MINUTES  · PLEASE INCLUDE HEART RATE WITH YOUR BLOOD PRESSURE READINGS  · When taking standing readings, keep your arm supported at heart level and not dangling  · Make sure you are sitting with your back supported and feet on the ground and do not cross your legs or feet  · Make sure you have not taken any coffee or caffeine products or exercised or smoke cigarettes at least 30 minutes before taking your blood pressure  Then please mail these readings into the office    4  Follow-up in 3-4 months   Please bring in 1 week a blood pressure readings morning evening, sitting and standing is outlined above   PLEASE BRING AN YOUR BLOOD PRESSURE MACHINE TO CORRELATE WITH THE OFFICE MACHINE AT THIS NEXT SCHEDULED VISIT   Please go for fasting lab work 1-2 weeks prior to your appointment      5  General instructions:   AVOID SALT BUT NOT ADDING AN READING LABELS TO MAKE SURE THERE IS LOW-SALT IN THE FOOD THAT YOU ARE EATING  o Goal is less than 2 g of sodium intake or less than 5 g of sodium chloride intake per day     Avoid nonsteroidal anti-inflammatory drugs such as Naprosyn, ibuprofen, Aleve, Advil, Celebrex, Meloxicam (Mobic) etc   You can use Tylenol as needed if you do not have any liver condition to be concerned about     Avoid medications such as Sudafed or decongestants and antihistamines that contained the D component which is the decongestant  You can take antihistamines without the decongestant or D component   Try to avoid medications such as pantoprazole or  Protonix/Nexium or Esomeprazole)/Prilosec or omeprazole/Prevacid or lansoprazole/AcipHex or Rabeprazole  If you are able to, use Pepcid as this is safer for your kidneys       Try to exercise at least 30 minutes 3 days a week to begin with with an ultimate goal of 5 days a week for at least 30 minutes     Try to lose 5-10 lb by your next visit     Please do not drink more than 2 glasses of alcohol/wine on a daily basis as this may contribute to your high blood pressure  6  Please discuss with your family physician as will I, about your depression  7  I will discuss with your rheumatologist about adjusting the gabapentin which may be contributing to your muscle jerks          Subjective:   PLEASE SEE ASSESSMENT PLAN FOR RECENT HOSPITALIZATIONS  The patient overall is feeling better:  Less urine frequency, and no dysuria and no bloody urine, some bubbles unchanged  No significant flank pain at this time  No fevers, chills  Still has a cough which is less productive than it was  Good appetite and good energy  No gastrointestinal symptoms  No cardiovascular symptoms including swelling of the legs  No headaches, dizziness or lightheadedness  She did have Mohs procedure on her forehead 1 week ago and is going to undergo Mohs procedure for her nose  Blood pressure medications/renal pertinent medications:  · Torsemide 20 mg daily  · Olmesartan 40 mg daily at nighttime  · Metoprolol 50 twice a day  · Doxazosin 2 mg at bedtime  · Amlodipine 5 mg daily in the morning  · Bactrim single strength twice a day  · Gabapentin 300 b i d     ROS:  See HPI, otherwise review of systems as completely reviewed with the patient are negative    Past Medical History:   Diagnosis Date    Allergic     Anxiety     Arthritis     Aspiration into airway     Basal cell carcinoma 2007    left cheek     Chronic kidney disease 2000, 2018    Stones, kidney disease stage 3    Diabetes mellitus ([address] )     Disease of thyroid gland     Family history of thyroid problem     GERD (gastroesophageal reflux disease)     Heart burn     Hyperplasia, parathyroid ([address] )     Hypertension     Kidney problem     Obesity (BMI 30 0-34  9)     Pneumonia     RLS (restless legs syndrome)     SCC (squamous cell carcinoma) 05/04/2021    left mid forehead    Seasonal allergies     Sleep apnea     uses CPAP    Squamous cell skin cancer 2007    left cheek     Swollen ankles      Past Surgical History:   Procedure Laterality Date    ARTHROSCOPY KNEE      BREAST BIOPSY      stereotactic-benign    BREAST BIOPSY      stereo-benign    BREAST EXCISIONAL BIOPSY      unknown date-benign    BREAST EXCISIONAL BIOPSY      unknown date-benign    BREAST EXCISIONAL BIOPSY      unknown date-benign    BREAST EXCISIONAL BIOPSY      unknown age-benign    CHOLECYSTECTOMY      HIATAL HERNIA REPAIR      LIPOSUCTION      MOHS SURGERY  05/20/2021    left mid forehead-Evan    REDUCTION MAMMAPLASTY Bilateral 2000    SKIN BIOPSY      SKIN CANCER EXCISION  2007    squamous cell carcinoma     SKIN CANCER EXCISION  2007    basal cell carcinoma    SQUAMOUS CELL CARCINOMA EXCISION      TOE SURGERY      TONSILLECTOMY      TUBAL LIGATION       Family History   Problem Relation Age of Onset    Heart disease Mother     Depression Mother     Hypertension Mother     COPD Mother     Hearing loss Mother     Heart disease Father     Lung cancer Father 79        Smoker     Cancer Father         brain    Alcohol abuse Father     Dementia Father     Hypertension Father     Thyroid disease Father     COPD Father     Arthritis Father     Brain cancer Father 76    Hypertension Sister     Diabetes Sister     Heart disease Sister     Thyroid disease Sister     Cancer Sister         Lympoma    Lung cancer Sister 78    Hypertension Brother     Diabetes Brother     Cancer Brother         Throat    Dementia Brother     Stroke Brother     Hypertension Brother     No Known Problems Son     No Known Problems Son     Heart disease Brother     Diabetes Brother     Brain cancer Paternal Aunt         unknown age      reports that she quit smoking about 45 years ago  Her smoking use included cigarettes  She started smoking about 53 years ago  She has a 20 00 pack-year smoking history  She has never used smokeless tobacco  She reports previous alcohol use  She reports that she does not use drugs      I COMPLETELY REVIEWED THE PAST MEDICAL HISTORY/PAST SURGICAL HISTORY/SOCIAL HISTORY/FAMILY HISTORY/AND MEDICATIONS  AND UPDATED ALL    Objective:     Vitals:   BP sitting on right:  146/70 with a heart rate of 60 and regular  BP standing on right:  130/70 with a heart rate of 60 and regular    Weight (last 2 days)     Date/Time   Weight    05/25/21 0846   77 6 (171)            Wt Readings from Last 3 Encounters:   05/25/21 77 6 kg (171 lb)   05/22/21 77 1 kg (170 lb)   05/20/21 78 kg (172 lb)       Body mass index is 30 29 kg/m²      Physical Exam: General:  Obese,No acute distress  Skin:  No acute rash  Eyes:  No scleral icterus, noninjected, no discharge from eyes  ENT:  Moist mucous membranes  Neck:  Supple, no jugular venous distention, trachea is midline, no lymphadenopathy and no thyromegaly  Back   No CVAT  Chest:  Clear to auscultation and percussion, good respiratory effort  CVS:  Regular rate and rhythm without a rub, or gallops or murmurs  Abdomen:  Obese,Soft and nontender with normal bowel sounds  Extremities:  No cyanosis and no edema, moderate arthritic changes, normal range of motion  Neuro:  Grossly intact  Psych:  Alert, oriented x3:Depressed about all of her recent and past hospitalizations      Medications:    Current Outpatient Medications:     acetaminophen (TYLENOL) 500 mg tablet, Take 1,000 mg by mouth as needed , Disp: , Rfl:     albuterol (Ventolin HFA) 90 mcg/act inhaler, Inhale 2 puffs every 6 (six) hours as needed for wheezing or shortness of breath, Disp: , Rfl: 0    amLODIPine (NORVASC) 5 mg tablet, TAKE 1 TABLET BY MOUTH EVERY DAY, Disp: 90 tablet, Rfl: 3    b complex vitamins tablet, Take 1 tablet by mouth daily , Disp: , Rfl:     B-D ULTRAFINE III SHORT PEN 31G X 8 MM MISC, USE AS DIRECTED 4 TIMES PER DAY, Disp: , Rfl: 3    doxazosin (CARDURA) 2 mg tablet, Take 2 tablets (4 mg total) by mouth daily at bedtime, Disp: 180 tablet, Rfl: 3    DULoxetine (Cymbalta) 30 mg delayed release capsule, Take 30 mg by mouth daily, Disp: , Rfl:     DULoxetine (CYMBALTA) 60 mg delayed release capsule, Take 90 mg by mouth daily, Disp: , Rfl:     famotidine (PEPCID) 40 MG tablet, Take 1 tablet (40 mg total) by mouth daily Take daily in am, Disp: 90 tablet, Rfl: 3    fluticasone (FLONASE) 50 mcg/act nasal spray, 1-2 sprays daily, Disp: , Rfl:     gabapentin (NEURONTIN) 300 mg capsule, Take 300 mg by mouth 2 (two) times a day , Disp: , Rfl:     Icosapent Ethyl (Vascepa) 1 g CAPS, Take 1 g by mouth 2 (two) times a day, Disp: , Rfl:     insulin aspart (NovoLOG) 100 units/mL injection, Inject under the skin 3 (three) times a day before meals 14 units, 14 units, 18 units  , Disp: , Rfl:     insulin detemir (LEVEMIR) 100 units/mL subcutaneous injection, Inject 32 Units under the skin daily at bedtime (Patient taking differently: Inject 40 Units under the skin daily at bedtime ), Disp: , Rfl: 0    levothyroxine 137 mcg tablet, Take 137 mcg by mouth, Disp: , Rfl:     metoprolol tartrate (LOPRESSOR) 50 mg tablet, Take 1 tablet (50 mg total) by mouth every 12 (twelve) hours, Disp: 180 tablet, Rfl: 3    olmesartan (BENICAR) 40 mg tablet, Take 1 tablet (40 mg total) by mouth daily, Disp: 90 tablet, Rfl: 0    pantoprazole (PROTONIX) 20 mg tablet, Take 1 tablet (20 mg total) by mouth daily, Disp: , Rfl:     pramipexole (MIRAPEX) 0 25 mg tablet, Take 1 tablet (0 25 mg total) by mouth daily, Disp: 90 tablet, Rfl: 3    rosuvastatin (CRESTOR) 5 mg tablet, TAKE 1 TABLET BY MOUTH EVERY DAY AT NIGHT, Disp: , Rfl:     sulfamethoxazole-trimethoprim (BACTRIM) 400-80 mg per tablet, Take 1 tablet by mouth every 12 (twelve) hours for 7 days, Disp: 14 tablet, Rfl: 0    torsemide (DEMADEX) 20 mg tablet, Take 1 tablet (20 mg total) by mouth daily, Disp: 90 tablet, Rfl: 3    Vitamin D, Ergocalciferol, 2000 units CAPS, Take 2,000 Units by mouth daily , Disp: , Rfl:     Lab, Imaging and other studies: I have personally reviewed pertinent labs    Laboratory Results:  Results for orders placed or performed during the hospital encounter of 05/22/21   Urine Microscopic   Result Value Ref Range    RBC, UA 0-1 None Seen, 0-1, 1-2, 2-4, 0-5 /hpf    WBC, UA 4-10 (A) None Seen, 0-1, 1-2, 0-5, 2-4 /hpf    Epithelial Cells Occasional None Seen, Occasional /hpf    Bacteria, UA Occasional None Seen, Occasional /hpf   CBC and differential   Result Value Ref Range    WBC 8 06 4 31 - 10 16 Thousand/uL    RBC 4 28 3 81 - 5 12 Million/uL    Hemoglobin 11 9 11 5 - 15 4 g/dL    Hematocrit 36 5 34 8 - 46 1 %    MCV 85 82 - 98 fL    MCH 27 8 26 8 - 34 3 pg    MCHC 32 6 31 4 - 37 4 g/dL    RDW 14 9 11 6 - 15 1 %    MPV 11 2 8 9 - 12 7 fL    Platelets 681 611 - 960 Thousands/uL    nRBC 0 /100 WBCs    Neutrophils Relative 65 43 - 75 %    Immat GRANS % 0 0 - 2 %    Lymphocytes Relative 24 14 - 44 %    Monocytes Relative 7 4 - 12 %    Eosinophils Relative 4 0 - 6 %    Basophils Relative 0 0 - 1 %    Neutrophils Absolute 5 19 1 85 - 7 62 Thousands/µL    Immature Grans Absolute 0 01 0 00 - 0 20 Thousand/uL    Lymphocytes Absolute 1 96 0 60 - 4 47 Thousands/µL    Monocytes Absolute 0 56 0 17 - 1 22 Thousand/µL    Eosinophils Absolute 0 31 0 00 - 0 61 Thousand/µL    Basophils Absolute 0 03 0 00 - 0 10 Thousands/µL   Comprehensive metabolic panel   Result Value Ref Range    Sodium 143 136 - 145 mmol/L    Potassium 4 9 3 5 - 5 3 mmol/L    Chloride 101 100 - 108 mmol/L    CO2 31 21 - 32 mmol/L    ANION GAP 11 4 - 13 mmol/L    BUN 36 (H) 5 - 25 mg/dL    Creatinine 1 79 (H) 0 60 - 1 30 mg/dL    Glucose 153 (H) 65 - 140 mg/dL    Calcium 9 1 8 3 - 10 1 mg/dL    Corrected Calcium 9 9 8 3 - 10 1 mg/dL    AST 40 5 - 45 U/L    ALT 35 12 - 78 U/L    Alkaline Phosphatase 94 46 - 116 U/L    Total Protein 7 1 6 4 - 8 2 g/dL    Albumin 3 0 (L) 3 5 - 5 0 g/dL    Total Bilirubin 0 42 0 20 - 1 00 mg/dL    eGFR 28 ml/min/1 73sq m   Urine Macroscopic, POC   Result Value Ref Range Color, UA Yellow     Clarity, UA Clear     pH, UA 7 0 4 5 - 8 0    Leukocytes, UA Small (A) Negative    Nitrite, UA Negative Negative    Protein,  (2+) (A) Negative mg/dl    Glucose, UA Negative Negative mg/dl    Ketones, UA Negative Negative mg/dl    Urobilinogen, UA 1 0 0 2, 1 0 E U /dl E U /dl    Bilirubin, UA Negative Negative    Blood, UA Negative Negative    Specific Gravity, UA 1 015 1 003 - 1 030       Results from last 7 days   Lab Units 05/22/21  0837   WBC Thousand/uL 8 06   HEMOGLOBIN g/dL 11 9   HEMATOCRIT % 36 5   PLATELETS Thousands/uL 241   POTASSIUM mmol/L 4 9   CHLORIDE mmol/L 101   CO2 mmol/L 31   BUN mg/dL 36*   CREATININE mg/dL 1 79*   CALCIUM mg/dL 9 1         Radiology review:   chest X-ray    Ultrasound      Portions of the record may have been created with voice recognition software  Occasional wrong word or "sound a like" substitutions may have occurred due to the inherent limitations of voice recognition software  Read the chart carefully and recognize, using context, where substitutions have occurred

## 2021-05-25 NOTE — PATIENT INSTRUCTIONS
1  Medication changes today:   Please increase doxazosin to 4 mg at bedtime which is 2-2 mg tablets  I did change the prescription in your drug store/pharmacy  2  Please go for non fasting  lab work over the next 1-2 days any time of the day    3  Please take 1 week a blood pressure readings  4-6 weeks after making the above medication change     AS FOLLOWS  MORNING AND EVENING, SITTING AND STANDING as follows:  · TAKE THE MORNING READINGS BEFORE ANY MEDICATIONS AND WHEN YOU ARE RELAXED FOR SEVERAL MINUTES  · TAKE THE EVENING READINGS:  BETWEEN 7-10 P M ; PRIOR TO ANY MEDICATIONS; AT LEAST IN OUR  FROM DINNER; AND CERTAINLY AFTER RELAXING FOR A FEW MINUTES  · PLEASE INCLUDE HEART RATE WITH YOUR BLOOD PRESSURE READINGS  · When taking standing readings, keep your arm supported at heart level and not dangling  · Make sure you are sitting with your back supported and feet on the ground and do not cross your legs or feet  · Make sure you have not taken any coffee or caffeine products or exercised or smoke cigarettes at least 30 minutes before taking your blood pressure  Then please mail these readings into the office    4  Follow-up in 3-4 months   Please bring in 1 week a blood pressure readings morning evening, sitting and standing is outlined above   PLEASE BRING AN YOUR BLOOD PRESSURE MACHINE TO CORRELATE WITH THE OFFICE MACHINE AT THIS NEXT SCHEDULED VISIT   Please go for fasting lab work 1-2 weeks prior to your appointment      5   General instructions:   AVOID SALT BUT NOT ADDING AN READING LABELS TO MAKE SURE THERE IS LOW-SALT IN THE FOOD THAT YOU ARE EATING  o Goal is less than 2 g of sodium intake or less than 5 g of sodium chloride intake per day     Avoid nonsteroidal anti-inflammatory drugs such as Naprosyn, ibuprofen, Aleve, Advil, Celebrex, Meloxicam (Mobic) etc   You can use Tylenol as needed if you do not have any liver condition to be concerned about     Avoid medications such as Sudafed or decongestants and antihistamines that contained the D component which is the decongestant  You can take antihistamines without the decongestant or D component   Try to avoid medications such as pantoprazole or  Protonix/Nexium or Esomeprazole)/Prilosec or omeprazole/Prevacid or lansoprazole/AcipHex or Rabeprazole  If you are able to, use Pepcid as this is safer for your kidneys   Try to exercise at least 30 minutes 3 days a week to begin with with an ultimate goal of 5 days a week for at least 30 minutes     Try to lose 5-10 lb by your next visit     Please do not drink more than 2 glasses of alcohol/wine on a daily basis as this may contribute to your high blood pressure  6  Please discuss with your family physician as will I, about your depression    7  I will discuss with your rheumatologist about adjusting the gabapentin which may be contributing to your muscle jerks

## 2021-05-26 ENCOUNTER — OFFICE VISIT (OUTPATIENT)
Dept: FAMILY MEDICINE CLINIC | Facility: CLINIC | Age: 71
End: 2021-05-26
Payer: MEDICARE

## 2021-05-26 VITALS
RESPIRATION RATE: 16 BRPM | HEIGHT: 63 IN | HEART RATE: 61 BPM | SYSTOLIC BLOOD PRESSURE: 112 MMHG | WEIGHT: 170 LBS | BODY MASS INDEX: 30.12 KG/M2 | TEMPERATURE: 97.5 F | DIASTOLIC BLOOD PRESSURE: 62 MMHG | OXYGEN SATURATION: 96 %

## 2021-05-26 DIAGNOSIS — F33.9 DEPRESSION, RECURRENT (HCC): Primary | ICD-10-CM

## 2021-05-26 DIAGNOSIS — E11.21 TYPE 2 DIABETES MELLITUS WITH DIABETIC NEPHROPATHY, WITH LONG-TERM CURRENT USE OF INSULIN (HCC): ICD-10-CM

## 2021-05-26 DIAGNOSIS — N18.4 STAGE 4 CHRONIC KIDNEY DISEASE (HCC): ICD-10-CM

## 2021-05-26 DIAGNOSIS — Z79.4 TYPE 2 DIABETES MELLITUS WITH DIABETIC NEPHROPATHY, WITH LONG-TERM CURRENT USE OF INSULIN (HCC): ICD-10-CM

## 2021-05-26 DIAGNOSIS — K31.84 GASTROPARESIS: ICD-10-CM

## 2021-05-26 PROCEDURE — 99496 TRANSJ CARE MGMT HIGH F2F 7D: CPT | Performed by: FAMILY MEDICINE

## 2021-05-26 RX ORDER — QUETIAPINE FUMARATE 25 MG/1
25 TABLET, FILM COATED ORAL
Qty: 30 TABLET | Refills: 2 | Status: SHIPPED | OUTPATIENT
Start: 2021-05-26 | End: 2021-08-04 | Stop reason: SDUPTHER

## 2021-05-26 NOTE — PROGRESS NOTES
Assessment/Plan:       Problem List Items Addressed This Visit        Digestive    Gastroparesis       Endocrine    Type 2 diabetes mellitus with diabetic nephropathy, with long-term current use of insulin [facility])     Patient will continue close f/u with endocrine  Most recent a1c at goal as noted  Lab Results   Component Value Date    HGBA1C 6 8 (H) 2021               Genitourinary    Stage 4 chronic kidney disease ([facility] )     Lab Results   Component Value Date    EGFR 28 2021    EGFR 26 2021    EGFR 17 2021    CREATININE 1 79 (H) 2021    CREATININE 1 92 (H) 2021    CREATININE 2 66 (H) 2021   Patient is feeling overwhelmed and stressed with the dietary restrictions due to her CKD and diabetes  She would like to speak to dietician to help her plan out some meals that would fit within these guidelines  Referral placed  She will continue close follow up with Dr Jameson Daniels for her CKD  Relevant Orders    Ambulatory referral to Nutrition Services       Other    Depression, recurrent ([facility] ) - Primary     PHQ-9 Depression Screening    PHQ-9:   Frequency of the following problems over the past two weeks:      Little interest or pleasure in doing things: 2 - more than half the days  Feeling down, depressed, or hopeless: 3 - nearly every day  Trouble falling or staying asleep, or sleeping too much: 1 - several days  Feeling tired or having little energy: 2 - more than half the days  Poor appetite or overeatin - several days  Feeling bad about yourself - or that you are a failure or have let yourself or your family down: 2 - more than half the days  Moving or speaking so slowly that other people could have noticed   Or the opposite - being so fidgety or restless that you have been moving around a lot more than usual: 1 - several days  Thoughts that you would be better off dead, or of hurting yourself in some way: 1 - several days  PHQ-2 Score: 5       Long discussion with patient about her depression  She notes that she is not having any SI, but sometimes feels it would be ok if she didn't wake up  She is feeling overwhelmed by all of the medical concerns that she has had  Does not want to "be a burden" to her family  Her son and  want her to go on vacation with them which she is planning in the next few weeks but she feels she really can't keep up with the things that she wants to do  We discussed how to approach this conversation with her family and how to work around some of these concerns (staying at vacation rental and reading while they go out and do more active things)  She is overwhelmed by the dietary restrictions of the CKD and type 2 diabetes plus her gastroparesis  I have referred her to dietician to help her with this  She will consider an appointment with therapist in our office after her vacation  She is on cymbalta 90 mg at present  Will add seroquel 25 mg nightly to augment the SNRI   Close follow up in 4 weeks, and she knows to call sooner if any concerns or side effects  Relevant Medications    QUEtiapine (SEROquel) 25 mg tablet               Most recent labs reviewed      Subjective:     Rex Parisi is a 66 y o  female here today with chief complaint below:  Chief Complaint   Patient presents with    Transition of Care Management     Epigastric abdominal pain with severe diabetic gastroparesis    Diabetes     Request eye exam - [facility]      - CC above per clinical staff and reviewed  HPI:  Pt is here for TCM visit s/p hospitalization 5/17-5/18 for epigastric pain secondary to gastroparesis  She has follow up scheduled with outpatient GI  She just saw nephrology Dr Daquan Gerardo yesterday  Eating small meals  (rice cakes, peanut butter, puddings, rice)   She is still coughing a bit, but not as acidic or bothersome as before  Pt says that she just feels overwhelmed at times    Hard to know what to eat with the gastroparesis, diabetes, kidney disease  Not sure what to cook  She and  and son live at home and she doesn't want to have to cook three different meals  Had labs at [facility] this AM to check renal function b/c she is on bactrim  Using cymbalta total of 90 mg for depression and fibromyalgia pain  This as helped to some degree  wrorries about being a burden to her family    Doesn't drive anymore  Stressed about going on vacation bc last vacation she had aspiration and was on a ventilator  Has chadd, on cpap but didn't use for the past few days  Able to fall asleep easy enough at night  Is going to decrease gabapentin- was getting twitching with this  Tried cbd w/o relief        TCM Call (since 4/30/2021)     Date and time call was made  5/19/2021 11:20 AM    Patient was hospitialized at  [address] [place] [address]        Date of Admission  05/17/21    Date of discharge  05/18/21    Diagnosis  Epigastric abdominal pain with severe diabetic gastroparesis, status post EGD/Botox injection    Disposition  Home    Were the patients medications reviewed and updated  Yes    Current Symptoms  Cough (Comment)  YELLOW COUGH UP       TCM Call (since 4/30/2021)     Post hospital issues  None    Scheduled for follow up?   Yes    Did you obtain your prescribed medications  Yes    Do you need help managing your prescriptions or medications  No    Is transportation to your appointment needed  No    I have advised the patient to call PCP with any new or worsening symptoms  HERIBERTO LILLY MA     Living Arrangements  Spouse or Significiant other    Support System  Children; Spouse    The type of support provided  Physical; Emotional    Do you have social support  Yes, as much as I need    Are you recieving any outpatient services  No    Are you recieving home care services  No    Are you using any community resources  No          The following portions of the patient's history were reviewed and updated as appropriate: allergies, current medications, past family history, past medical history, past social history, past surgical history and problem list     ROS:  Review of Systems       Objective:      /62   Pulse 61   Temp 97 5 °F (36 4 °C)   Resp 16   Ht 5' 3" (1 6 m)   Wt 77 1 kg (170 lb)   SpO2 96%   BMI 30 11 kg/m²   BP Readings from Last 3 Encounters:   05/27/21 120/64   05/26/21 112/62   05/22/21 132/69     Wt Readings from Last 3 Encounters:   05/27/21 78 3 kg (172 lb 9 6 oz)   05/26/21 77 1 kg (170 lb)   05/25/21 77 6 kg (171 lb)               Physical Exam:   Physical Exam  Vitals signs and nursing note reviewed  Constitutional:       Appearance: Normal appearance  She is not ill-appearing  HENT:      Head: Normocephalic and atraumatic  Neck:      Vascular: No carotid bruit  Cardiovascular:      Rate and Rhythm: Normal rate and regular rhythm  Heart sounds: No murmur  Pulmonary:      Effort: Pulmonary effort is normal  No respiratory distress  Breath sounds: No wheezing or rhonchi  Abdominal:      Palpations: Abdomen is soft  Tenderness: There is no abdominal tenderness  Neurological:      Mental Status: She is alert and oriented to person, place, and time  Psychiatric:      Comments: Depressed affect  Tearful at times    No SI

## 2021-05-27 ENCOUNTER — TELEPHONE (OUTPATIENT)
Dept: FAMILY MEDICINE CLINIC | Facility: CLINIC | Age: 71
End: 2021-05-27

## 2021-05-27 ENCOUNTER — PROCEDURE VISIT (OUTPATIENT)
Dept: DERMATOLOGY | Facility: CLINIC | Age: 71
End: 2021-05-27
Payer: MEDICARE

## 2021-05-27 ENCOUNTER — TELEPHONE (OUTPATIENT)
Dept: NEPHROLOGY | Facility: CLINIC | Age: 71
End: 2021-05-27

## 2021-05-27 VITALS
WEIGHT: 172.6 LBS | RESPIRATION RATE: 16 BRPM | BODY MASS INDEX: 30.57 KG/M2 | DIASTOLIC BLOOD PRESSURE: 64 MMHG | TEMPERATURE: 97.4 F | HEART RATE: 64 BPM | SYSTOLIC BLOOD PRESSURE: 120 MMHG

## 2021-05-27 DIAGNOSIS — I10 ESSENTIAL HYPERTENSION: Primary | ICD-10-CM

## 2021-05-27 DIAGNOSIS — C44.311 BASAL CELL CARCINOMA (BCC) OF SKIN OF NOSE: ICD-10-CM

## 2021-05-27 DIAGNOSIS — D63.1 ANEMIA IN STAGE 4 CHRONIC KIDNEY DISEASE (HCC): ICD-10-CM

## 2021-05-27 DIAGNOSIS — N18.4 ANEMIA IN STAGE 4 CHRONIC KIDNEY DISEASE (HCC): ICD-10-CM

## 2021-05-27 DIAGNOSIS — N18.4 STAGE 4 CHRONIC KIDNEY DISEASE (HCC): ICD-10-CM

## 2021-05-27 PROCEDURE — 17311 MOHS 1 STAGE H/N/HF/G: CPT | Performed by: STUDENT IN AN ORGANIZED HEALTH CARE EDUCATION/TRAINING PROGRAM

## 2021-05-27 PROCEDURE — 15260 FTH/GFT FR N/E/E/L 20 SQCM/<: CPT | Performed by: STUDENT IN AN ORGANIZED HEALTH CARE EDUCATION/TRAINING PROGRAM

## 2021-05-27 NOTE — PATIENT INSTRUCTIONS
Mohs Microscopic Surgery After Care    WOUND CARE AFTER SURGERY:    For the donor site on the right cheek, in front of the ear:    1  Try NOT to remove the pressure bandage for 48 hours  Keep the area clean and dry while this bandage is on  2  After removing the bandage for the first time, gently clean the area with soap and water  If the bandage is difficult to remove, getting the bandage wet in the shower will sometimes help soften the adhesive and allow it to be removed more easily  3  You will now need to cleanse this area daily in the shower with gentle soap  There is no need to scrub the area  4  There is no need for further wound care for 2 weeks  You will notice over this time that the glue will start to flake off  Do not apply and Vaseline or Aquaphor to the area as this will dissolve your skin glue  If you would like to keep the area covered, non stick dressing and paper tape (or Hypafix) are recommended for sensitive skin but a bandaid is fine if it covers the area well  After 2 weeks, you can go ahead and use some Vaseline or Aquaphor to help dissolve any remaining glue  For the graft site (nose):     Do NOT remove the top taped-on pressure bandage for 48 hours  There is a bandage stitched in place under this that will stay in place for a week  After 48 hours, you can remove the top bandage and you will now need to cleanse this area daily in the shower with gentle soap  The graft is very delicate  Do NOT let direct water pressure hit it in the shower  You may keep a bandage in place while in the shower to avoid this  There is no need to scrub the area  You will need to apply plain Vaseline ointment (this is over the counter and not a prescription) to the site for three weeks followed by a clean appropriately sized bandage to area    Non stick dressing and paper tape (or Hypafix) are recommended for sensitive skin but a bandaid is fine if it covers the area well without sticking to the graft  Covering the area with Vaseline and a bandage is NEEDED to protect the graft while it is healing for at least 3 weeks  RESTRICTIONS:     For two DAYS:   - You will need to take it very easy as this time is highest risk for bleeding  Being a "couch potato" during these two days is generally recommended  - For surgeries on the face/neck/scalp: Avoid leaning down to pick things up off the floor as this brings blood up to your head  Instead, squat down to pick things up  For two WEEKS:   - No heavy lifting (anything greater than 10 pounds)   - You can start to do slow, gentle activities such as slow walking but nothing to increase your heart rate and blood pressure too much (such as cardiovascular exercise)  It is important to take it easy as there is still a risk for bleeding and a high risk popping of stitches open during this time  If we did surgery around your nose: No blowing your nose as this puts you at higher risk of popping stitches durign this time  Instead dab under your nose with a tissue or use a Q-tip inside your nose  MANAGING YOUR PAIN AFTER SURGERY     You can expect to have some pain after surgery  This is normal  The pain is typically worse the first two days after surgery, and quickly begins to get better  The best strategy for controlling your pain after surgery is around the clock pain control  You can take over the counter Acetaminophen (Tylenol) for discomfort, if no contraindications  If you are taking this at the maximum dose, you can alternate this with Motrin (ibuprofen or Advil) as well  Alternating these medications with each other allows you to maximize your pain control  In addition to Tylenol and Motrin, you can use heating pads or ice packs on your incisions to help reduce your pain  How will I alternate your regular strength over-the-counter pain medication? You will take a dose of pain medication every three hours     Start by taking 650 mg of Tylenol (2 pills of 325 mg)   3 hours later take 600 mg of Motrin (3 pills of 200 mg)   3 hours after taking the Motrin take 650 mg of Tylenol   3 hours after that take 600 mg of Motrin  See example - if your first dose of Tylenol is at 12:00 PM     12:00 PM  Tylenol 650 mg (2 pills of 325 mg)    3:00 PM  Motrin 600 mg (3 pills of 200 mg)    6:00 PM  Tylenol 650 mg (2 pills of 325 mg)    9:00 PM  Motrin 600 mg (3 pills of 200 mg)    Continue alternating every 3 hours      Important:   Do not take more than 4000mg of Tylenol or 3200mg of Motrin in a 24-hour period  What if I still have pain? If you have pain that is not controlled with the over-the-counter pain medications (Tylenol and Motrin or Advil), don't hesitate to call our staff using the number provided  We will help make sure you are managing your pain in the best way possible, and if necessary, we can provide a prescription for additional pain medication  CALL OUR OFFICE IMMEDIATELY FOR ANY SIGNS OF INFECTION:    This includes fever, chills, increased redness, warmth, tenderness, severe discomfort/pain, or pus or foul smell coming from the wound  [facility] directly at [phone] (SKIN)    IF BLEEDING IS NOTICED:    Place a clean cloth over the area and apply firm pressure for thirty minutes  Check the wound ONLY after 30 minutes of direct pressure; do not cheat and sneak a peak, as that does not count  If bleeding persists after 30 minutes of legitimate direct pressure, then try one more round of direct pressure to the area  Should the bleeding become heavier or not stop after the second attempt, call [facility] directly at [phone] (SKIN)  Your call will get routed to the dermatology surgeon on call even after hours

## 2021-05-27 NOTE — TELEPHONE ENCOUNTER
I spoke to the patient she is aware after she completes her Bactrim on Saturday we will repeat labs given elevated Cr otherwise no changes to this time

## 2021-05-27 NOTE — PROGRESS NOTES
MOHS Procedure Note    Patient: Mariza Slider  : 1950  MRN: [id number]  Date: 2021    History of Present Illness: The patient is a 66 y o  female who presents with complaints of Basal Cell Carcinoma of the Left Nasal tip  Past Medical History:   Diagnosis Date    Allergic     Anxiety     Arthritis     Aspiration into airway     Basal cell carcinoma     left cheek     BCC (basal cell carcinoma) 2021    Left Nasal tip    Chronic kidney disease ,     Stones, kidney disease stage 3    Diabetes mellitus ([address] )     Disease of thyroid gland     Family history of thyroid problem     GERD (gastroesophageal reflux disease)     Heart burn     [facility]plasia, parathyroid ([facility]     Hypertension     Kidney problem     Obesity (BMI 30 0-34  9)     Pneumonia     RLS (restless legs syndrome)     SCC (squamous cell carcinoma) 2021    left mid forehead    Seasonal allergies     Sleep apnea     uses CPAP    Squamous cell skin cancer     left cheek     Swollen ankles        Past Surgical History:   Procedure Laterality Date    ARTHROSCOPY KNEE      BREAST BIOPSY      stereotactic-benign    BREAST BIOPSY      stereo-benign    BREAST EXCISIONAL BIOPSY      unknown date-benign    BREAST EXCISIONAL BIOPSY      unknown date-benign    BREAST EXCISIONAL BIOPSY      unknown date-benign    BREAST EXCISIONAL BIOPSY      unknown age-benign    CHOLECYSTECTOMY      HIATAL HERNIA REPAIR      LIPOSUCTION      MOHS SURGERY  2021    left mid forehead-Gautam    MOHS SURGERY Left 2021    Left nasal tip- gautam     REDUCTION MAMMAPLASTY Bilateral     SKIN BIOPSY      SKIN CANCER EXCISION  2007    squamous cell carcinoma     SKIN CANCER EXCISION  2007    basal cell carcinoma    SQUAMOUS CELL CARCINOMA EXCISION      TOE SURGERY      TONSILLECTOMY      TUBAL LIGATION           Current Outpatient Medications:     acetaminophen (TYLENOL) 500 mg tablet, Take 1,000 mg by mouth as needed , Disp: , Rfl:     albuterol (Ventolin HFA) 90 mcg/act inhaler, Inhale 2 puffs every 6 (six) hours as needed for wheezing or shortness of breath, Disp: , Rfl: 0    amLODIPine (NORVASC) 5 mg tablet, TAKE 1 TABLET BY MOUTH EVERY DAY, Disp: 90 tablet, Rfl: 3    b complex vitamins tablet, Take 1 tablet by mouth daily , Disp: , Rfl:     B-D ULTRAFINE III SHORT PEN 31G X 8 MM MISC, USE AS DIRECTED 4 TIMES PER DAY, Disp: , Rfl: 3    doxazosin (CARDURA) 2 mg tablet, Take 2 tablets (4 mg total) by mouth daily at bedtime, Disp: 180 tablet, Rfl: 3    DULoxetine (Cymbalta) 30 mg delayed release capsule, Take 30 mg by mouth daily, Disp: , Rfl:     DULoxetine (CYMBALTA) 60 mg delayed release capsule, Take 90 mg by mouth daily, Disp: , Rfl:     famotidine (PEPCID) 40 MG tablet, Take 1 tablet (40 mg total) by mouth daily Take daily in am, Disp: 90 tablet, Rfl: 3    fluticasone (FLONASE) 50 mcg/act nasal spray, 1-2 sprays daily, Disp: , Rfl:     gabapentin (NEURONTIN) 300 mg capsule, Take 300 mg by mouth 2 (two) times a day , Disp: , Rfl:     Icosapent Ethyl (Vascepa) 1 g CAPS, Take 1 g by mouth 2 (two) times a day, Disp: , Rfl:     insulin aspart (NovoLOG) 100 units/mL injection, Inject under the skin 3 (three) times a day before meals 14 units, 14 units, 18 units  , Disp: , Rfl:     insulin detemir (LEVEMIR) 100 units/mL subcutaneous injection, Inject 32 Units under the skin daily at bedtime (Patient taking differently: Inject 40 Units under the skin daily at bedtime ), Disp: , Rfl: 0    levothyroxine 137 mcg tablet, Take 137 mcg by mouth, Disp: , Rfl:     metoprolol tartrate (LOPRESSOR) 50 mg tablet, Take 1 tablet (50 mg total) by mouth every 12 (twelve) hours, Disp: 180 tablet, Rfl: 3    olmesartan (BENICAR) 40 mg tablet, Take 1 tablet (40 mg total) by mouth daily, Disp: 90 tablet, Rfl: 0    pantoprazole (PROTONIX) 20 mg tablet, Take 1 tablet (20 mg total) by mouth daily, Disp: , Rfl:    pramipexole (MIRAPEX) 0 25 mg tablet, Take 1 tablet (0 25 mg total) by mouth daily, Disp: 90 tablet, Rfl: 3    QUEtiapine (SEROquel) 25 mg tablet, Take 1 tablet (25 mg total) by mouth daily at bedtime, Disp: 30 tablet, Rfl: 2    rosuvastatin (CRESTOR) 5 mg tablet, TAKE 1 TABLET BY MOUTH EVERY DAY AT NIGHT, Disp: , Rfl:     torsemide (DEMADEX) 20 mg tablet, Take 1 tablet (20 mg total) by mouth daily, Disp: 90 tablet, Rfl: 3    Vitamin D, Ergocalciferol, 2000 units CAPS, Take 2,000 Units by mouth daily , Disp: , Rfl:     Allergies   Allergen Reactions    Pollen Extract     Ciprofloxacin Hives and Itching       Physical Exam:   Vitals:    05/27/21 1040   BP: 120/64   Pulse: 64   Resp: 16   Temp: (!) 97 4 °F (36 3 °C)     General: Awake, Alert, Oriented x 3, Mood and affect appropriate  Respiratory: Respirations even and unlabored  Cardiovascular: Peripheral pulses intact; no edema  Musculoskeletal Exam: n/a  Skin: 0 6 x 0 9 cm pink atrophic scar on left nasal tip     Assessment: Biopsy proven to be Basal Cell Carcinoma of the Left nasal tip    Plan: MOHS    MOHS Procedure Timeout      Most Recent Value   Timeout:  1120   Patient Identity Verified:  Yes   Correct Site Verified:  Yes   Correct Procedure Verified:  Yes          MOHS Diagnosis/Indication/Location/ID      Most Recent Value   Pathology Type  Basal cell carcinoma   Anatomic Site  -- [Left Nasal tip]   Indications for MOHS  tumor location   MOHS ID  [id number]          MOHS Site/Accession/Pre-Post      Most Recent Value   Original Site Identified (as submitted by referring clinician)  Photo   Biopsy Accession/Specimen # (as submitted by referring clincian)  [id number]   Pre-MOHS Size Length (cm)  0 6   Pre-MOHS Size Width (cm)  0 9   Post-MOHS Size-Length (cm)  0 7   Post MOHS Size-Width (cm)  0 9   Repair Type  Full thickness skin graft   Suture Type  Fast absorbing gut, Vicryl [Glue]   Fast Absorbing Suture Size  5   Vicryl Suture Size  4   Flap/Graft area (cm2)  1 6   Anesthetic Used  1% Lidocaine with epinephrine          MOHS Tumor Stage 1 Information      Most Recent Value   Tissue Sections (blocks)  1   Microscopic Exam Section 1:  No tumor identified in section  Tumor Clear After Stage I? Yes                      Patient identified, procedure verified, site identified and verified  Time out completed  Surgical removal of the lesion discussed with the patient (risks and benefits, including possibility of scarring, infection, recurrence or potential for further treatment)  I have specifically identified the site with the patient  I have discussed the fact that the patient will have a scar after the procedure regardless of granulation or repair with sutures  I have discussed that the repair options can range from granulation in some cases to linear or curvilinear closures to larger flaps or grafts  There are sometimes flaps or grafts used that require multiples stages of surgery and will not be completed today, rather be completed over a series of appointments  I have discussed that occasionally due to location, size or depth of the lesion I may recommend consultation with and transfer of care for further removal or the reconstruction to another provider such as ophthalmology surgery, plastic surgery, ENT surgery, or surgical oncology  There are cases in which other testing such as imaging with MRI or CT scan or testing of lymph nodes is recommended because of the nature/depth/location of tumor seen during the removal  There is a risk of injury to nerves causing temporary or permanent numbness or the inability to move muscles full such as the inability to lift eyebrows  Questions answered and verbal and written consent was obtained  The tumor qualifies for Mohs based on AUC criteria  Dr Shani Gaines served as the surgeon and pathologist during the procedure      OPERATIVE REPORT: FULL-THICKNESS SKIN GRAFT    With the patient in the supine position and under adequate local anesthesia with 1% lidocaine with epinephrine 1:100,000, the defect was scrubbed with Hibiclens  Sterile drapes were placed from the sterile tray  Because of the location of the surgical defect,  a full thickness skin graft was judged to give the best possible cosmetic and functional result  A Telfa template of the recipient site was used to create the shape of the skin resected from the donor site, the right pre-auricualr ear  The graft donor site was raised, hemostasis was achieved, cutaneous margins were approximated and closed with buried subcutaneous sutures as noted above  The cutaneous margins were approximated and closed as well utilizing sutures as noted above  The wound was dressed with cyanoacrylate glue, a non-stick pad, and a compression dressing  The full thickness skin graft was defatted  The bed of the recipient site was prepared by limited surgical debridement  The graft was placed into the recipient site and secured by simple interrupted and running sutures as noted above  The patient tolerated the procedure well  The wound was dressed with  petrolatum, a non-stick pad, and a compression dressing  Wound care was discussed with the patient, and a wound care instruction sheet was given  Miki Shaw MD served as the surgeon and pathologist during the procedure  Postoperative care: Wound care discussed at length  I urged the patient to call us if any problems or question should arise       Complications: none  Post-op medications: none  Patient condition after procedure: stable  Discharge plans: Return for wound check in 2 weeks    Scribe Attestation    I,:  Marcel Canales MA am acting as a scribe while in the presence of the attending physician :       I,:  Miki Shaw MD personally performed the services described in this documentation    as scribed in my presence :

## 2021-05-27 NOTE — TELEPHONE ENCOUNTER
Rec'd preop request from ENT    Please call patient to schedule preop visit    Procedure scheduled for 6/24/21

## 2021-05-27 NOTE — TELEPHONE ENCOUNTER
----- Message from Mirza Lee MD sent at 5/27/2021  9:54 AM EDT -----  The patient's creatinine is higher most likely related to Bactrim  After she completes her course of Bactrim on Saturday, I would wait 1 full week and then recheck a basic metabolic profile nonfasting    Thank you

## 2021-05-28 ENCOUNTER — TELEPHONE (OUTPATIENT)
Dept: FAMILY MEDICINE CLINIC | Facility: CLINIC | Age: 71
End: 2021-05-28

## 2021-05-28 NOTE — TELEPHONE ENCOUNTER
Spoke to patient and obtained the below Pre-op information:    Name of procedure: endoscopy  Date of procedure (within 30 days): 6/24/21  Surgeon: Dr Pat Rudolph  Location of procedure (hospital or out patient facility name):Specialty mat @ [facility] date/location/ type (Which labs? EKG?  CXR?): labs & EKG  Records (on [facility] or requested): EPIC  Type of anaesthesia:  general  Paperwork to complete for Pre-op (patient bringing vs  calling office to obtain prior to appointment): [facility] in [facility]  Pre-op appointment scheduled (date/time): 6/8/2021

## 2021-05-31 NOTE — ASSESSMENT & PLAN NOTE
Patient will continue close f/u with endocrine  Most recent a1c at goal as noted    Lab Results   Component Value Date    HGBA1C 6 8 (H) 05/24/2021

## 2021-05-31 NOTE — ED ATTENDING ATTESTATION
5/22/2021  I, Megan Nelson MD, saw and evaluated the patient  I have discussed the patient with the resident/non-physician practitioner and agree with the resident's/non-physician practitioner's findings, Plan of Care, and MDM as documented in the resident's/non-physician practitioner's note, except where noted  All available labs and Radiology studies were reviewed  I was present for key portions of any procedure(s) performed by the resident/non-physician practitioner and I was immediately available to provide assistance  At this point I agree with the current assessment done in the Emergency Department  I have conducted an independent evaluation of this patient a history and physical is as follows:    65 yo female with h/o CKD, DM, HTN, HLD, nephrolithiasis in past p/w urinary symptoms  Pt denies hematuria, but does endorse some mild low back pain different from baseline  Denies f/c, but complains of malaise  No h/o n/v/d  On exam pt with mild CVA TTP on left, abdominal exam otherwise benign  Pt with cx positive urine that was positive for E coli from 5/17 that she deferred treatment on  Work up in ED reassuring  CT without obstruction  Urine with small pyruia  Pt treated based on prior culture results with renally dosed bactrim and pt to f/u with PCP or RTEr for progressive symptoms  ED Course  ED Course as of May 31 1925   Sat May 22, 2021   0727 Went to evaluate pt, pt in restroom presumably  1021 No significant leukocytosis or bandemia   CBC and differential   0913 Small pyuria, known +cx from several days ago   Urine Microscopic(!)   0933 Improved from four days ago   Creatinine(!): 1 79   1035    IMPRESSION:  Nonobstructive left upper renal pole nephrolithiasis without further urinary calculi appreciated      Status post hernia repair with moderate recurrent hernia noted, stable     CT renal stone study abdomen pelvis without contrast         Critical Care Time  Procedures

## 2021-05-31 NOTE — ASSESSMENT & PLAN NOTE
Lab Results   Component Value Date    EGFR 28 05/22/2021    EGFR 26 05/18/2021    EGFR 17 05/17/2021    CREATININE 1 79 (H) 05/22/2021    CREATININE 1 92 (H) 05/18/2021    CREATININE 2 66 (H) 05/17/2021   Patient is feeling overwhelmed and stressed with the dietary restrictions due to her CKD and diabetes  She would like to speak to dietician to help her plan out some meals that would fit within these guidelines  Referral placed  She will continue close follow up with Dr Mariposa Calles for her CKD

## 2021-05-31 NOTE — ASSESSMENT & PLAN NOTE
PHQ-9 Depression Screening    PHQ-9:   Frequency of the following problems over the past two weeks:      Little interest or pleasure in doing things: 2 - more than half the days  Feeling down, depressed, or hopeless: 3 - nearly every day  Trouble falling or staying asleep, or sleeping too much: 1 - several days  Feeling tired or having little energy: 2 - more than half the days  Poor appetite or overeatin - several days  Feeling bad about yourself - or that you are a failure or have let yourself or your family down: 2 - more than half the days  Moving or speaking so slowly that other people could have noticed  Or the opposite - being so fidgety or restless that you have been moving around a lot more than usual: 1 - several days  Thoughts that you would be better off dead, or of hurting yourself in some way: 1 - several days  PHQ-2 Score: 5       Long discussion with patient about her depression  She notes that she is not having any SI, but sometimes feels it would be ok if she didn't wake up  She is feeling overwhelmed by all of the medical concerns that she has had  Does not want to "be a burden" to her family  Her son and  want her to go on vacation with them which she is planning in the next few weeks but she feels she really can't keep up with the things that she wants to do  We discussed how to approach this conversation with her family and how to work around some of these concerns (staying at vacation rental and reading while they go out and do more active things)  She is overwhelmed by the dietary restrictions of the CKD and type 2 diabetes plus her gastroparesis  I have referred her to dietician to help her with this  She will consider an appointment with therapist in our office after her vacation  She is on cymbalta 90 mg at present  Will add seroquel 25 mg nightly to augment the SNRI   Close follow up in 4 weeks, and she knows to call sooner if any concerns or side effects

## 2021-06-01 ENCOUNTER — TELEPHONE (OUTPATIENT)
Dept: NEPHROLOGY | Facility: CLINIC | Age: 71
End: 2021-06-01

## 2021-06-01 NOTE — TELEPHONE ENCOUNTER
Patient called the office over the weekend she noticed her BP was decreasing extremely to 88/55 on doxazosin 4 mg daily  She advised thereafter went back on her original dosage of 2 mg daily and is doing much better now while BP at 118/64,120/5/66 after medications in the morning  She will be going for her labs next week at scheduled

## 2021-06-01 NOTE — TELEPHONE ENCOUNTER
Great continue just 2 mg of the doxazosin  Have her wait a few weeks and repeat a week a blood pressure readings morning evening sitting and standing  Thank you

## 2021-06-01 NOTE — TELEPHONE ENCOUNTER
Lm for the patient advising Serge Rizo is okay with change and would suggest just working on 1 week of BP readings

## 2021-06-04 ENCOUNTER — HOSPITAL ENCOUNTER (OUTPATIENT)
Dept: RADIOLOGY | Age: 71
Discharge: HOME | End: 2021-06-04
Payer: MEDICARE

## 2021-06-04 DIAGNOSIS — Z78.0 POST-MENOPAUSAL: ICD-10-CM

## 2021-06-04 PROCEDURE — 77080 DXA BONE DENSITY AXIAL: CPT

## 2021-06-08 ENCOUNTER — OFFICE VISIT (OUTPATIENT)
Dept: FAMILY MEDICINE CLINIC | Facility: CLINIC | Age: 71
End: 2021-06-08
Payer: MEDICARE

## 2021-06-08 ENCOUNTER — TELEPHONE (OUTPATIENT)
Dept: ADMINISTRATIVE | Facility: OTHER | Age: 71
End: 2021-06-08

## 2021-06-08 VITALS
TEMPERATURE: 97.9 F | SYSTOLIC BLOOD PRESSURE: 116 MMHG | DIASTOLIC BLOOD PRESSURE: 68 MMHG | RESPIRATION RATE: 14 BRPM | OXYGEN SATURATION: 99 % | WEIGHT: 166.8 LBS | BODY MASS INDEX: 29.55 KG/M2 | HEART RATE: 59 BPM | HEIGHT: 63 IN

## 2021-06-08 DIAGNOSIS — N18.30 BENIGN HYPERTENSION WITH CKD (CHRONIC KIDNEY DISEASE) STAGE III (HCC): ICD-10-CM

## 2021-06-08 DIAGNOSIS — Z01.818 PRE-OPERATIVE EXAMINATION: Primary | ICD-10-CM

## 2021-06-08 DIAGNOSIS — I12.9 BENIGN HYPERTENSION WITH CKD (CHRONIC KIDNEY DISEASE) STAGE III (HCC): ICD-10-CM

## 2021-06-08 DIAGNOSIS — D63.1 ANEMIA OF CHRONIC RENAL FAILURE, STAGE 3B (HCC): ICD-10-CM

## 2021-06-08 DIAGNOSIS — J44.9 CHRONIC OBSTRUCTIVE PULMONARY DISEASE, UNSPECIFIED COPD TYPE (HCC): Chronic | ICD-10-CM

## 2021-06-08 DIAGNOSIS — G47.33 OBSTRUCTIVE SLEEP APNEA: ICD-10-CM

## 2021-06-08 DIAGNOSIS — N18.32 ANEMIA OF CHRONIC RENAL FAILURE, STAGE 3B (HCC): ICD-10-CM

## 2021-06-08 DIAGNOSIS — K21.9 GASTROESOPHAGEAL REFLUX DISEASE WITHOUT ESOPHAGITIS: ICD-10-CM

## 2021-06-08 PROBLEM — N17.9 ACUTE KIDNEY INJURY SUPERIMPOSED ON CHRONIC KIDNEY DISEASE (HCC): Status: RESOLVED | Noted: 2018-09-19 | Resolved: 2021-06-08

## 2021-06-08 PROBLEM — N18.9 ACUTE KIDNEY INJURY SUPERIMPOSED ON CHRONIC KIDNEY DISEASE (HCC): Status: RESOLVED | Noted: 2018-09-19 | Resolved: 2021-06-08

## 2021-06-08 PROBLEM — N18.9 ACUTE KIDNEY INJURY SUPERIMPOSED ON CHRONIC KIDNEY DISEASE: Status: RESOLVED | Noted: 2018-09-19 | Resolved: 2021-06-08

## 2021-06-08 PROBLEM — N17.9 ACUTE KIDNEY INJURY SUPERIMPOSED ON CHRONIC KIDNEY DISEASE: Status: RESOLVED | Noted: 2018-09-19 | Resolved: 2021-06-08

## 2021-06-08 PROCEDURE — 99214 OFFICE O/P EST MOD 30 MIN: CPT | Performed by: FAMILY MEDICINE

## 2021-06-08 NOTE — PATIENT INSTRUCTIONS
The night prior to the procedure, decrease levemir from 40 units to 25 units  Don't take your morning insulin the day of the procedure

## 2021-06-08 NOTE — PROGRESS NOTES
Presurgical Evaluation    Subjective:      Patient ID: Celeste Duenas is a 66 y o  female  Chief Complaint   Patient presents with    Pre-op Exam       HPI    The following portions of the patient's history were reviewed and updated as appropriate: allergies, current medications, past family history, past medical history, past social history, past surgical history and problem list     Procedure date: 6/24/21    Surgeon: Dr Arlet Ram  Planned procedure:  Sleep endoscopy   Diagnosis for procedure:  BARBARA    Prior anesthesia: Had difficult intubation once, but other episodes where she required anesthesia, no reports of difficulty  No negative side effects/complications when she has had anesthesia      CAD History: None      Pulmonary History: COPD, BARBARA    Renal history: CKD stage 4 - GFR 15-29       Diabetes History:  Type 2  Controlled     Lab Results   Component Value Date    HGBA1C 6 8 (H) 05/24/2021       Neurological History: None     On Immunosuppressant meds/biologics: No      Review of Systems      Current Outpatient Medications   Medication Sig Dispense Refill    acetaminophen (TYLENOL) 500 mg tablet Take 1,000 mg by mouth as needed       albuterol (Ventolin HFA) 90 mcg/act inhaler Inhale 2 puffs every 6 (six) hours as needed for wheezing or shortness of breath  0    amLODIPine (NORVASC) 5 mg tablet TAKE 1 TABLET BY MOUTH EVERY DAY 90 tablet 3    b complex vitamins tablet Take 1 tablet by mouth daily       B-D ULTRAFINE III SHORT PEN 31G X 8 MM MISC USE AS DIRECTED 4 TIMES PER DAY  3    doxazosin (CARDURA) 2 mg tablet Take 2 tablets (4 mg total) by mouth daily at bedtime (Patient taking differently: Take 2 mg by mouth daily at bedtime ) 180 tablet 3    DULoxetine (Cymbalta) 30 mg delayed release capsule Take 30 mg by mouth daily      DULoxetine (CYMBALTA) 60 mg delayed release capsule Take 90 mg by mouth daily      famotidine (PEPCID) 40 MG tablet Take 1 tablet (40 mg total) by mouth daily Take daily in am 90 tablet 3    fluticasone (FLONASE) 50 mcg/act nasal spray 1-2 sprays daily      gabapentin (NEURONTIN) 300 mg capsule Take 300 mg by mouth 2 (two) times a day       Icosapent Ethyl (Vascepa) 1 g CAPS Take 1 g by mouth 2 (two) times a day      insulin aspart (NovoLOG) 100 units/mL injection Inject under the skin 3 (three) times a day before meals 14 units, 14 units, 18 units   insulin detemir (LEVEMIR) 100 units/mL subcutaneous injection Inject 32 Units under the skin daily at bedtime (Patient taking differently: Inject 40 Units under the skin daily at bedtime )  0    levothyroxine 137 mcg tablet Take 137 mcg by mouth      metoprolol tartrate (LOPRESSOR) 50 mg tablet Take 1 tablet (50 mg total) by mouth every 12 (twelve) hours 180 tablet 3    olmesartan (BENICAR) 40 mg tablet Take 1 tablet (40 mg total) by mouth daily 90 tablet 0    pantoprazole (PROTONIX) 20 mg tablet Take 1 tablet (20 mg total) by mouth daily      pramipexole (MIRAPEX) 0 25 mg tablet Take 1 tablet (0 25 mg total) by mouth daily 90 tablet 3    QUEtiapine (SEROquel) 25 mg tablet Take 1 tablet (25 mg total) by mouth daily at bedtime 30 tablet 2    rosuvastatin (CRESTOR) 5 mg tablet TAKE 1 TABLET BY MOUTH EVERY DAY AT NIGHT      torsemide (DEMADEX) 20 mg tablet Take 1 tablet (20 mg total) by mouth daily 90 tablet 3    Vitamin D, Ergocalciferol, 2000 units CAPS Take 2,000 Units by mouth daily        No current facility-administered medications for this visit          Allergies on file:   Pollen extract and Ciprofloxacin    Patient Active Problem List   Diagnosis    Acute kidney injury superimposed on chronic kidney disease ([facility] [address] )    Hypertensive chronic kidney disease with stage 1 through stage 4 chronic kidney disease, or unspecified chronic kidney disease    BARBARA on CPAP    RLS (restless legs syndrome)    Persistent proteinuria    Type 2 diabetes mellitus with diabetic nephropathy, with long-term current use of insulin ([facility] [address] [facility]    Pulmonary hypertension ([facility] [address] )    Dyspnea    History of repair of hiatal hernia    Morbid (severe) obesity due to excess calories (HCC)    Dyslipidemia    Vitamin D deficiency    Anemia of chronic renal failure, stage 3 (moderate) (HCC)    Fibromyalgia    Hallucinations    Type 2 diabetes mellitus with diabetic chronic kidney disease (HCC)    GERD (gastroesophageal reflux disease)    Altered mental status    Benign hypertension with CKD (chronic kidney disease) stage III    History of kidney stones    Urinary urgency    Essential hypertension    Ambulatory dysfunction    Memory changes    Gastroparesis    B12 neuropathy (HCC)    Depression, recurrent (HCC)    Chronic respiratory failure with hypoxia (HCC)    Stage 4 chronic kidney disease (HCC)    Anemia in stage 4 chronic kidney disease (HCC)    Epigastric abdominal pain with severe diabetic gastroparesis, status post EGD/Botox injection, 5/14    Cough    COPD (chronic obstructive pulmonary disease) (HCC)        Past Medical History:   Diagnosis Date    Allergic     Anxiety     Arthritis     Aspiration into airway     Basal cell carcinoma 2007    left cheek     BCC (basal cell carcinoma) 05/27/2021    Left Nasal tip    Chronic kidney disease 2000, 2018    Stones, kidney disease stage 3    Diabetes mellitus (HCC)     Disease of thyroid gland     Family history of thyroid problem     GERD (gastroesophageal reflux disease)     Heart burn     Hyperplasia, parathyroid ([address] )     Hypertension     Kidney problem     Obesity (BMI 30 0-34  9)     Pneumonia     RLS (restless legs syndrome)     SCC (squamous cell carcinoma) 05/04/2021    left mid forehead    Seasonal allergies     Sleep apnea     uses CPAP    Squamous cell skin cancer 2007    left cheek     Swollen ankles        Past Surgical History:   Procedure Laterality Date    ARTHROSCOPY KNEE      BREAST BIOPSY      stereotactic-benign    BREAST BIOPSY stereo-benign    BREAST EXCISIONAL BIOPSY      unknown date-benign    BREAST EXCISIONAL BIOPSY      unknown date-benign    BREAST EXCISIONAL BIOPSY      unknown date-benign    BREAST EXCISIONAL BIOPSY      unknown age-benign    CHOLECYSTECTOMY      HIATAL HERNIA REPAIR      LIPOSUCTION      MOHS SURGERY  2021    left mid forehead-Gautam    MOHS SURGERY Left 2021    Left nasal tip- gautam     REDUCTION MAMMAPLASTY Bilateral 2000    SKIN BIOPSY      SKIN CANCER EXCISION  2007    squamous cell carcinoma     SKIN CANCER EXCISION  2007    basal cell carcinoma    SQUAMOUS CELL CARCINOMA EXCISION      TOE SURGERY      TONSILLECTOMY      TUBAL LIGATION         Family History   Problem Relation Age of Onset    Heart disease Mother     Depression Mother     Hypertension Mother     COPD Mother     Hearing loss Mother     Heart disease Father     Lung cancer Father 79        Smoker     Cancer Father         brain    Alcohol abuse Father     Dementia Father     Hypertension Father     Thyroid disease Father     COPD Father     Arthritis Father     Brain cancer Father 76    Hypertension Sister     Diabetes Sister     Heart disease Sister     Thyroid disease Sister     Cancer Sister         Lympoma    Lung cancer Sister 78    Hypertension Brother     Diabetes Brother     Cancer Brother         Throat    Dementia Brother     Stroke Brother     Hypertension Brother     No Known Problems Son     No Known Problems Son     Heart disease Brother     Diabetes Brother     Brain cancer Paternal Aunt         unknown age       Social History     Tobacco Use    Smoking status: Former Smoker     Packs/day: 2 00     Years: 10 00     Pack years: 20 00     Types: Cigarettes     Start date: 1967     Quit date:      Years since quittin 4    Smokeless tobacco: Never Used   Substance Use Topics    Alcohol use: Not Currently     Alcohol/week: 0 0 standard drinks    Drug use: No       Objective:    Vitals:    21 0934   BP: 116/68   Pulse: 59   Resp: 14   Temp: 97 9 °F (36 6 °C)   SpO2: 99%   Weight: 75 7 kg (166 lb 12 8 oz)   Height: 5' 3" (1 6 m)        Physical Exam  Vitals signs and nursing note reviewed  Constitutional:       General: She is not in acute distress  Appearance: Normal appearance  HENT:      Head: Normocephalic and atraumatic  Cardiovascular:      Rate and Rhythm: Normal rate and regular rhythm  Pulmonary:      Effort: Pulmonary effort is normal       Breath sounds: Normal breath sounds  No wheezing, rhonchi or rales  Abdominal:      Palpations: Abdomen is soft  Tenderness: There is no abdominal tenderness  Neurological:      Mental Status: She is alert and oriented to person, place, and time  Psychiatric:         Mood and Affect: Mood normal          Behavior: Behavior normal                eGFR   Date Value Ref Range Status   2021 28 ml/min/1 73sq m Final     WBC   Date Value Ref Range Status   2021 8 06 4 31 - 10 16 Thousand/uL Final     INR   Date Value Ref Range Status   2021 0 94 0 84 - 1 19 Final       EKG yes- first degree AV block (similar to previous), no st/t changes, no ectopy    Echo last in 2020- in Epic      Functional capacity: Walking , 4-5 MPH               4 Mets   Pick the highest level patient can comfortably perform   4 mets or greater for surgery    RCRI  High Risk surgery?   NO       0 Point  CAD History: NO        0 Point     CHF Active: no       0 Point   Pulm Edema; Paroxysmal Nocturnal Dyspnea;  Bibasilar Rales (crackles);S3; CHF on CXR  Cerebrovascular Disease (TIA or CVA): NO    0 Point  DM on Insulin:YES        1 Point  Serum Creat >2 0 mg/dl:    Lab Results   Component Value Date    CREATININE 1 79 (H) 2021    (will message Dr Carmencita Hughes in regards to any precautions due to CKD)      0 Point          Total Points: 1     Scorin: Class I, Very Low Risk (0 4%)     1: Class II, Low risk (0 9%)     2: Class III Moderate (6 6%)     3: Class IV High (>11%)      ANDRA Risk:  GFR: 6/7/21 GFR 31              Assessment/Plan:    Patient is medically optimized (cleared) for the planned procedure  I will ask Dr Vicky Moon to provide any recommendations in regards to patient's CKD    She will decrease levemir to 25 units (from 40 units) the night prior to procedure  Not on aspirin  Further testing/evaluation is not required  Lab Results   Component Value Date    WBC 8 06 05/22/2021    HGB 11 9 05/22/2021    HCT 36 5 05/22/2021    MCV 85 05/22/2021     05/22/2021         Problem List Items Addressed This Visit        Digestive    GERD (gastroesophageal reflux disease)     Patient has had Nissen fundoplication in 5747  Follows with GI and is on protonix 20 mg along with famotidine 40 mg daily  Hiatal hernia  Respiratory    COPD (chronic obstructive pulmonary disease) (HCC) (Chronic)     Was hospitalized last month with presumed aspiration  Lungs clear on exam today  Cardiovascular and Mediastinum    Benign hypertension with CKD (chronic kidney disease) stage III     Lab Results   Component Value Date    EGFR 28 05/22/2021    EGFR 26 05/18/2021    EGFR 17 05/17/2021    CREATININE 1 79 (H) 05/22/2021    CREATININE 1 92 (H) 05/18/2021    CREATININE 2 66 (H) 05/17/2021   no med changes  Blood pressure controlled on exam today            Genitourinary    Anemia of chronic renal failure, stage 3 (moderate) (HCC)     most recent hemogloin 11 9, which is about at her baseline  Other Visit Diagnoses     Pre-operative examination    -  Primary    Obstructive sleep apnea             6/15/21 addendum:  Reviewed with Dr Vicky Moon (nephrology)- no changes needed prior to the sleep endoscopy from a renal standpoint    If patient needs anything more extensive, patient would require preoperative eval

## 2021-06-08 NOTE — LETTER
Diabetic Eye Exam Form    Date Requested: 21  Patient: Mariza Avila  Patient : 1950   Referring Provider: Kristofer Ruffin MD    Dilated Retinal Exam, Optomap-Iris Exam, or Fundus Photography Done         Yes ([place] one above)         No     Date of Diabetic Eye Exam ______________________________  Left Eye      Exam did show retinopathy    Exam did not show retinopathy         Mild       Moderate       None       Proliferative       Severe     Right Eye     Exam did show retinopathy    Exam did not show retinopathy         Mild       Moderate       None       Proliferative       Severe     Comments __________________________________________________________    Practice Providing Exam ______________________________________________    Exam Performed By (print name) _______________________________________      Provider Signature ___________________________________________________      These reports are needed for  compliance    Please fax this completed form and a copy of the Diabetic Eye Exam report to our office located at [place] [address] as soon as possible to [phone] scout Gibson: Phone [phone]    We thank you for your assistance in treating our mutual patient

## 2021-06-08 NOTE — TELEPHONE ENCOUNTER
----- Message from Fredi Pena LPN sent at [phone]  9:37 AM EDT -----  [facility]  21 9:37 AM    Hello, our patient Edgar Tavera has had Diabetic Eye Exam completed/performed  Please assist in updating the patient chart by making an External outreach to [facility] facility located in [facility]  The date of service is       Thank you,  Fredi Pena LPN  [facility]

## 2021-06-08 NOTE — TELEPHONE ENCOUNTER
Upon review of the In Basket request and the patient's chart, initial outreach has been made via fax, please see Contacts section for details       Thank you  Dwain Allison, [address] [facility] [phone] Fax [phone]

## 2021-06-09 ENCOUNTER — TELEPHONE (OUTPATIENT)
Dept: NEPHROLOGY | Facility: CLINIC | Age: 71
End: 2021-06-09

## 2021-06-09 ENCOUNTER — OFFICE VISIT (OUTPATIENT)
Dept: DERMATOLOGY | Facility: CLINIC | Age: 71
End: 2021-06-09

## 2021-06-09 VITALS — WEIGHT: 166 LBS | BODY MASS INDEX: 29.41 KG/M2 | HEIGHT: 63 IN | TEMPERATURE: 97.5 F

## 2021-06-09 DIAGNOSIS — Z51.89 VISIT FOR WOUND CHECK: Primary | ICD-10-CM

## 2021-06-09 PROCEDURE — 99024 POSTOP FOLLOW-UP VISIT: CPT | Performed by: STUDENT IN AN ORGANIZED HEALTH CARE EDUCATION/TRAINING PROGRAM

## 2021-06-09 NOTE — TELEPHONE ENCOUNTER
----- Message from Selvin Matthew MD sent at 6/9/2021  3:22 PM EDT -----  Please let the patient know that the labs look good including her kidney numbers

## 2021-06-09 NOTE — PATIENT INSTRUCTIONS
Advise gentle skin care as follows:  Shower with lukewarm water less than 10 minutes    Use Dove unscented soap to groin and armpits and neck   Pat dry after shower  Do not harshly rub   Immediately moisturize with heavy emollient   o BEST - OINTMENTS, such as Vaseline, Aquaphor, Cerave healing ointment      o BETTER - CREAMS, such as Cerave, Cetaphil, VaniCREAM, Aveeno, Eucerin  o AVOID LOTIONS, too thin, most things in pump   Moisturize twice a day  WOUND/GRAFT CHECK    Assessment and Plan:  Based on a thorough discussion of this condition and the management approach to it (including a comprehensive discussion of the known risks, side effects and potential benefits of treatment), the patient (family) agrees to implement the following specific plan:   Continue to apply Vaseline and a bandage for another two weeks until your follow up on 6/23/2021

## 2021-06-09 NOTE — PROGRESS NOTES
WOUND/GRAFT CHECK    Physical Exam:   Anatomic Location Affected:  Left nasal tip   Description of wound: healing well,vascularized graft site with minimal crusting laterally   Closure Type: Graft    Additional History of Present Condition:  Patient had MOHS on 5/27/2021 with Dr Joleen Anguiano and Plan:  Based on a thorough discussion of this condition and the management approach to it (including a comprehensive discussion of the known risks, side effects and potential benefits of treatment), the patient (family) agrees to implement the following specific plan:   Continue to apply Vaseline and a bandage for another two weeks until your follow up on 6/23/2021  No direct water pressure over area until then  Use bandage when in the shower        Scribe Attestation    I,:  Brian Engle am acting as a scribe while in the presence of the attending physician :       I,:  Michelle Rahman MD personally performed the services described in this documentation    as scribed in my presence :

## 2021-06-09 NOTE — TELEPHONE ENCOUNTER
Upon review of the In Basket request we were able to locate, review, and update the patient chart as requested for Diabetic Eye Exam     Any additional questions or concerns should be emailed to the Practice Liaisons via [email or web address] email, please do not reply via In Basket      Thank you  Josefina Banks

## 2021-06-11 ENCOUNTER — ANESTHESIA EVENT (OUTPATIENT)
Dept: PERIOP | Facility: AMBULARY SURGERY CENTER | Age: 71
End: 2021-06-11
Payer: MEDICARE

## 2021-06-11 NOTE — ASSESSMENT & PLAN NOTE
Lab Results   Component Value Date    EGFR 28 05/22/2021    EGFR 26 05/18/2021    EGFR 17 05/17/2021    CREATININE 1 79 (H) 05/22/2021    CREATININE 1 92 (H) 05/18/2021    CREATININE 2 66 (H) 05/17/2021   no med changes    Blood pressure controlled on exam today

## 2021-06-11 NOTE — ASSESSMENT & PLAN NOTE
Patient has had Nissen fundoplication in 2908  Follows with GI and is on protonix 20 mg along with famotidine 40 mg daily  Hiatal hernia

## 2021-06-22 RX ORDER — INSULIN ASPART 100 [IU]/ML
18-24 INJECTION, SOLUTION INTRAVENOUS; SUBCUTANEOUS
COMMUNITY
Start: 2021-06-08 | End: 2021-10-12

## 2021-06-22 RX ORDER — MULTIVITAMIN
1 CAPSULE ORAL DAILY
COMMUNITY
End: 2022-04-14 | Stop reason: HOSPADM

## 2021-06-22 RX ORDER — INSULIN DETEMIR 100 [IU]/ML
50 INJECTION, SOLUTION SUBCUTANEOUS EVERY EVENING
COMMUNITY
Start: 2021-06-08

## 2021-06-22 NOTE — PRE-PROCEDURE INSTRUCTIONS
Pre-Surgery Instructions:   Medication Instructions    acetaminophen (TYLENOL) 500 mg tablet hold day of surgery    albuterol (Ventolin HFA) 90 mcg/act inhaler use day of surgery if needed and bring with her    amLODIPine (NORVASC) 5 mg tablet take day of surgery    b complex vitamins tablet hold from now till after surgery    doxazosin (CARDURA) 2 mg tablet takes at night    DULoxetine (Cymbalta) 30 mg delayed release capsule take day of surgery    DULoxetine (CYMBALTA) 60 mg delayed release capsule take day of surgery    famotidine (PEPCID) 40 MG tablet take day of surgery    fluticasone (FLONASE) 50 mcg/act nasal spray take day of surgery    gabapentin (NEURONTIN) 300 mg capsule takes at night    Icosapent Ethyl (Vascepa) 1 g CAPS hold from now till after surgery    insulin aspart (NovoLOG FlexPen) 100 UNIT/ML injection pen hold day of surgery    insulin aspart (NovoLOG) 100 units/mL injection Hold day of surgery    insulin detemir (Levemir FlexTouch) 100 Units/mL injection pen takes at night    insulin detemir (LEVEMIR) 100 units/mL subcutaneous injection takes at night    levothyroxine 137 mcg tablet take day of surgery    metoprolol tartrate (LOPRESSOR) 50 mg tablet take day of surgery    Multiple Vitamin (multivitamin) capsule hold from now till after surgery    olmesartan (BENICAR) 40 mg tablet hold day of surgery    pantoprazole (PROTONIX) 20 mg tablet takes in afternoon    pramipexole (MIRAPEX) 0 25 mg tablet takes at night    QUEtiapine (SEROquel) 25 mg tablet takes at night    rosuvastatin (CRESTOR) 5 mg tablet takes at night    torsemide (DEMADEX) 20 mg tablet hold day of surgery    Vitamin D, Ergocalciferol, 2000 units CAPS hold day of surgery    My Surgical Experience    The following information was developed to assist you to prepare for your operation      What do I need to do before coming to the hospital?   Arrange for a responsible person to drive you to and from the hospital    Arrange care for your children at home  Children are not allowed in the recovery areas of the hospital   Plan to wear clothing that is easy to put on and take off  If you are having shoulder surgery, wear a shirt that buttons or zippers in the front  Bathing  o Shower the evening before and the morning of your surgery with an antibacterial soap  Please refer to the Pre Op Showering Instructions for Surgery Patients Sheet   o Remove nail polish and all body piercing jewelry  o Do not shave any body part for at least 24 hours before surgery-this includes face, arms, legs and upper body  Food  o Nothing to eat or drink after midnight the night before your surgery  This includes candy and chewing gum  o Exception: If your surgery is after 12:00pm (noon), you may have clear liquids such as 7-Up®, ginger ale, apple or cranberry juice, Jell-O®, water, or clear broth until 8:00 am  o Do not drink milk or juice with pulp on the morning before surgery  o Do not drink alcohol 24 hours before surgery  Medicine  o Follow instructions you received from your surgeon about which medicines you may take on the day of surgery  o If instructed to take medicine on the morning of surgery, take pills with just a small sip of water  Call your prescribing doctor for specific infroamtion on what to do if you take insulin    What should I bring to the hospital?    Bring:  Valentino Lexis or a walker, if you have them, for foot or knee surgery   A list of the daily medicines, vitamins, minerals, herbals and nutritional supplements you take   Include the dosages of medicines and the time you take them each day   Glasses, dentures or hearing aids   Minimal clothing; you will be wearing hospital sleepwear   Photo ID; required to verify your identity   If you have a Living Will or Power of , bring a copy of the documents   If you have an ostomy, bring an extra pouch and any supplies you use    Do not bring   Medicines or inhalers   Money, valuables or jewelry    What other information should I know about the day of surgery?  Notify your surgeons if you develop a cold, sore throat, cough, fever, rash or any other illness   Report to the Ambulatory Surgical/Same Day Surgery Unit   You will be instructed to stop at Registration only if you have not been pre-registered   Inform your  fi they do not stay that they will be asked by the staff to leave a phone number where they can be reached   Be available to be reached before surgery  In the event the operating room schedule changes, you may be asked to come in earlier or later than expected    *It is important to tell your doctor and others involved in your health care if you are taking or have been taking any non-prescription drugs, vitamins, minerals, herbals or other nutritional supplements   Any of these may interact with some food or medicines and cause a reaction

## 2021-06-23 ENCOUNTER — OFFICE VISIT (OUTPATIENT)
Dept: DERMATOLOGY | Facility: CLINIC | Age: 71
End: 2021-06-23

## 2021-06-23 DIAGNOSIS — Z51.89 VISIT FOR WOUND CHECK: Primary | ICD-10-CM

## 2021-06-23 PROCEDURE — 99024 POSTOP FOLLOW-UP VISIT: CPT | Performed by: STUDENT IN AN ORGANIZED HEALTH CARE EDUCATION/TRAINING PROGRAM

## 2021-06-23 NOTE — PROGRESS NOTES
WOUND CHECK    Physical Exam:   Anatomic Location Affected:  Left Nasal Tip    Description of wound: Well Healing Skin Graft   Closure Type: Full thickness skin graft  Additional History of Present Condition:  Patient presents today for another check on her graft  Patient has been using Vaseline and sun block  Patient denies any pain from the site  Assessment and Plan:  Based on a thorough discussion of this condition and the management approach to it (including a comprehensive discussion of the known risks, side effects and potential benefits of treatment), the patient (family) agrees to implement the following specific plan:   Apply sun block on the areas daily   If the slight pinkness does not go away patient can come in and we will do laser treatment (PDL) on the pink edges   Annual skin checks     Start using Neutrogena daily defense or Cerave AM 3 times daily for sun protection          Scribe Attestation    I,:  Mima Lorenz MA am acting as a scribe while in the presence of the attending physician :       I,:  Caren Oneill MD personally performed the services described in this documentation    as scribed in my presence :

## 2021-06-23 NOTE — PATIENT INSTRUCTIONS
WOUND CHECK    Assessment and Plan:  Based on a thorough discussion of this condition and the management approach to it (including a comprehensive discussion of the known risks, side effects and potential benefits of treatment), the patient (family) agrees to implement the following specific plan:   Apply sun block on the areas daily   If the slight pinkness does not go away patient can come in and we will do laser treatment (PDL) on the pink edges   Annual skin checks     Start using Neutrogena daily defense or Cerave AM 3 times daily for sun protection

## 2021-06-24 ENCOUNTER — ANESTHESIA (OUTPATIENT)
Dept: PERIOP | Facility: AMBULARY SURGERY CENTER | Age: 71
End: 2021-06-24
Payer: MEDICARE

## 2021-06-24 ENCOUNTER — HOSPITAL ENCOUNTER (OUTPATIENT)
Facility: AMBULARY SURGERY CENTER | Age: 71
Setting detail: OUTPATIENT SURGERY
Discharge: HOME | End: 2021-06-24
Attending: OTOLARYNGOLOGY | Admitting: OTOLARYNGOLOGY
Payer: MEDICARE

## 2021-06-24 VITALS
HEART RATE: 61 BPM | WEIGHT: 167 LBS | RESPIRATION RATE: 17 BRPM | SYSTOLIC BLOOD PRESSURE: 145 MMHG | OXYGEN SATURATION: 96 % | DIASTOLIC BLOOD PRESSURE: 67 MMHG | TEMPERATURE: 97.5 F | HEIGHT: 63 IN | BODY MASS INDEX: 29.59 KG/M2

## 2021-06-24 LAB — GLUCOSE SERPL-MCNC: 140 MG/DL (ref 65–140)

## 2021-06-24 PROCEDURE — 31575 DIAGNOSTIC LARYNGOSCOPY: CPT | Performed by: OTOLARYNGOLOGY

## 2021-06-24 PROCEDURE — 82948 REAGENT STRIP/BLOOD GLUCOSE: CPT

## 2021-06-24 RX ORDER — SODIUM CHLORIDE, SODIUM LACTATE, POTASSIUM CHLORIDE, CALCIUM CHLORIDE 600; 310; 30; 20 MG/100ML; MG/100ML; MG/100ML; MG/100ML
INJECTION, SOLUTION INTRAVENOUS CONTINUOUS PRN
Status: DISCONTINUED | OUTPATIENT
Start: 2021-06-24 | End: 2021-06-24

## 2021-06-24 RX ORDER — SODIUM CHLORIDE, SODIUM LACTATE, POTASSIUM CHLORIDE, CALCIUM CHLORIDE 600; 310; 30; 20 MG/100ML; MG/100ML; MG/100ML; MG/100ML
125 INJECTION, SOLUTION INTRAVENOUS CONTINUOUS
Status: DISCONTINUED | OUTPATIENT
Start: 2021-06-24 | End: 2021-06-24 | Stop reason: HOSPADM

## 2021-06-24 RX ORDER — PROPOFOL 10 MG/ML
INJECTION, EMULSION INTRAVENOUS AS NEEDED
Status: DISCONTINUED | OUTPATIENT
Start: 2021-06-24 | End: 2021-06-24

## 2021-06-24 RX ORDER — PROPOFOL 10 MG/ML
INJECTION, EMULSION INTRAVENOUS CONTINUOUS PRN
Status: DISCONTINUED | OUTPATIENT
Start: 2021-06-24 | End: 2021-06-24

## 2021-06-24 RX ORDER — LIDOCAINE HYDROCHLORIDE 10 MG/ML
0.5 INJECTION, SOLUTION EPIDURAL; INFILTRATION; INTRACAUDAL; PERINEURAL ONCE AS NEEDED
Status: DISCONTINUED | OUTPATIENT
Start: 2021-06-24 | End: 2021-06-24 | Stop reason: HOSPADM

## 2021-06-24 RX ORDER — ACETAMINOPHEN 325 MG/1
650 TABLET ORAL EVERY 6 HOURS PRN
Status: DISCONTINUED | OUTPATIENT
Start: 2021-06-24 | End: 2021-06-24 | Stop reason: HOSPADM

## 2021-06-24 RX ORDER — OXYMETAZOLINE HYDROCHLORIDE 0.05 G/100ML
2 SPRAY NASAL
Status: COMPLETED | OUTPATIENT
Start: 2021-06-24 | End: 2021-06-24

## 2021-06-24 RX ORDER — GLYCOPYRROLATE 0.2 MG/ML
0.2 INJECTION INTRAMUSCULAR; INTRAVENOUS ONCE
Status: COMPLETED | OUTPATIENT
Start: 2021-06-24 | End: 2021-06-24

## 2021-06-24 RX ADMIN — SODIUM CHLORIDE, SODIUM LACTATE, POTASSIUM CHLORIDE, AND CALCIUM CHLORIDE: .6; .31; .03; .02 INJECTION, SOLUTION INTRAVENOUS at 10:11

## 2021-06-24 RX ADMIN — PROPOFOL 100 MCG/KG/MIN: 10 INJECTION, EMULSION INTRAVENOUS at 10:13

## 2021-06-24 RX ADMIN — OXYMETAZOLINE HYDROCHLORIDE 2 SPRAY: 0.05 SPRAY NASAL at 09:46

## 2021-06-24 RX ADMIN — PROPOFOL 30 MG: 10 INJECTION, EMULSION INTRAVENOUS at 10:13

## 2021-06-24 RX ADMIN — OXYMETAZOLINE HYDROCHLORIDE 2 SPRAY: 0.05 SPRAY NASAL at 10:02

## 2021-06-24 RX ADMIN — GLYCOPYRROLATE 0.2 MG: 0.2 INJECTION, SOLUTION INTRAMUSCULAR; INTRAVENOUS at 10:00

## 2021-06-24 NOTE — H&P
Lucille Phlefamilia is a 66 y  o female who presents for re-evaluation of sleep apnea, BMI of 31, pulmonary htn, and CKD  She underwent a sleep study showing AHI of 19 5 on 3/8/21  No previous airway evaluation  She has tried CPAP and was unable to tolerate mask due to leaks  No change from previous evaluation  Past Medical History:   Diagnosis Date    Allergic     Anxiety     Arthritis     Aspiration into airway     Basal cell carcinoma 2007    left cheek     BCC (basal cell carcinoma) 05/27/2021    Left Nasal tip    Chronic kidney disease 2000, 2018    Stones, kidney disease stage 4    CPAP (continuous positive airway pressure) dependence     Diabetes mellitus (HCC)     Disease of thyroid gland     Family history of thyroid problem     GERD (gastroesophageal reflux disease)     Heart burn     Hyperlipidemia     Hyperplasia, parathyroid ([address] )     Hypertension     Kidney problem     Kidney stone     Obesity (BMI 30 0-34  9)     Pneumonia     RLS (restless legs syndrome)     SCC (squamous cell carcinoma) 05/04/2021    left mid forehead    Seasonal allergies     Sleep apnea     uses CPAP    Squamous cell skin cancer 2007    left cheek     Swollen ankles        BP (!) 179/74   Pulse 61   Temp (!) 96 5 °F (35 8 °C) (Temporal)   Resp 18   Ht 5' 3" (1 6 m)   Wt 75 8 kg (167 lb)   SpO2 97%   BMI 29 58 kg/m²       Physical Exam   Constitutional: Oriented to person, place, and time  Well-developed and well-nourished, no apparent distress, non-toxic appearance  Cooperative, able to hear and answer questions without difficulty  Voice: Normal voice quality  Head: Normocephalic, atraumatic  No scars, masses or lesions  Face: Symmetric, no edema, no sinus tenderness  Eyes: Vision grossly intact, extra-ocular movement intact  Ears: External ear normal   Bilateral tympanic membranes are intact with intact normal landmarks  No post-auricular erythema or tenderness    Nose: Septum midline, nares clear   Mucosa moist, turbinates well appearing  No crusting, polyps or discharge evident  Oral cavity: Dentition intact  Mucosa moist, lips normal   Tongue mobile, floor of mouth normal   Hard palate unremarkable  No masses or lesions  Oropharynx: Uvula is midline, soft palate normal   Unremarkable oropharyngeal inlet  Tonsils unremarkable  Posterior pharyngeal wall clear  No masses or lesions  Salivary glands:  Parotid glands and submandibular glands symmetric, no enlargement or tenderness  Neck: Normal laryngeal elevation with swallow  Trachea midline  No masses or lesions  No palpable adenopathy  Thyroid: normal in size, unremarkable without tenderness or palpable nodules  Pulmonary/Chest: Normal effort and rate  No respiratory distress  Musculoskeletal: Normal range of motion  Neurological: Cranial nerves 2-12 intact  Skin: Skin is warm and dry  Psychiatric: Normal mood and affect  CTAB  RRR  Abd soft ntnd    A/P: Obstructive sleep apnea: We discussed the nature of obstructive sleep apnea  We discussed the natural history of sleep apnea  We discussed options for management  We discussed non-surgical management including weight loss, mandibular advancement devices, and positive airway pressure therapy including various options  We discussed that she is not tolerating her CPAP and has at least moderate BARBARA with an AHI of 19 5 and BMI of 31, she would like to move forward with Drug Induced Sleep Endoscopy to evaluate the source of the obstructions  We will plan for DISE  If surgical treatable causes of sleep apnea are seen such as tongue base laxity, we will consider options for surgical treatment  After the procedure we will further discuss surgical and non-surgical options, if necessary, at that time

## 2021-06-24 NOTE — ANESTHESIA PREPROCEDURE EVALUATION
Procedure:  EXAM UNDER ANESTHESIA (EUA), DISE (N/A Throat)  FBS-140  Relevant Problems   CARDIO   (+) Benign hypertension with CKD (chronic kidney disease) stage III   (+) Essential hypertension      ENDO   (+) Type 2 diabetes mellitus with diabetic chronic kidney disease (HCC)   (+) Type 2 diabetes mellitus with diabetic nephropathy, with long-term current use of insulin (HCC)      GI/HEPATIC   (+) GERD (gastroesophageal reflux disease)      /RENAL   (+) Anemia of chronic renal failure, stage 3 (moderate) (HCC)   (+) Hypertensive chronic kidney disease with stage 1 through stage 4 chronic kidney disease, or unspecified chronic kidney disease   (+) Stage 4 chronic kidney disease (HCC)      HEMATOLOGY   (+) Anemia in stage 4 chronic kidney disease (HCC)   (+) Anemia of chronic renal failure, stage 3 (moderate) (HCC)      MUSCULOSKELETAL   (+) Fibromyalgia      NEURO/PSYCH   (+) Depression, recurrent (HCC)   (+) Fibromyalgia   (+) History of kidney stones      PULMONARY   (+) COPD (chronic obstructive pulmonary disease) (HCC)   (+) Chronic respiratory failure with hypoxia (HCC)   (+) Dyspnea   (+) BARBARA on CPAP   5/20  IMPRESSIONS:  There is no clear evidence of significant pulmonary vascular disease by noninvasive assessment  PASP<40 mmHg  Notably, there is mild IVS septal bounce which may be related to HFpEF     LEFT VENTRICLE:  Systolic function was normal  Ejection fraction was estimated in the range of 55 %  Although no diagnostic regional wall motion abnormality was identified, this possibility cannot be completely excluded on the basis of this study  Physical Exam    Airway    Mallampati score: II  TM Distance: >3 FB  Neck ROM: full     Dental   No notable dental hx     Cardiovascular      Pulmonary      Other Findings        Anesthesia Plan  ASA Score- 3     Anesthesia Type- IV sedation with anesthesia with ASA Monitors           Additional Monitors:   Airway Plan:           Plan Factors-Exercise tolerance (METS): >4 METS  Chart reviewed  EKG reviewed  Imaging results reviewed  Existing labs reviewed  Patient is not a current smoker  Patient not instructed to abstain from smoking on day of procedure  Patient did not smoke on day of surgery  Induction- intravenous  Postoperative Plan-     Informed Consent- Anesthetic plan and risks discussed with patient  I personally reviewed this patient with the CRNA  Discussed and agreed on the Anesthesia Plan with the CRNA             Lab Results   Component Value Date    GLUC 153 (H) 05/22/2021    GLUF 159 (H) 05/18/2021    ALT 35 05/22/2021    AST 40 05/22/2021    BUN 36 (H) 05/22/2021    CALCIUM 9 1 05/22/2021     05/22/2021    CO2 31 05/22/2021    CREATININE 1 79 (H) 05/22/2021    INR 0 94 05/17/2021    HCT 36 5 05/22/2021    HGB 11 9 05/22/2021    HGBA1C 6 8 (H) 05/24/2021    MG 2 1 03/06/2020    PHOS 3 8 03/06/2020     05/22/2021    K 4 9 05/22/2021    WBC 8 06 05/22/2021

## 2021-06-24 NOTE — DISCHARGE INSTRUCTIONS
Drug Induced Sleep Endoscopy     WHAT YOU SHOULD KNOW:   During a drug induced sleep endsocopy (DISE), your caregiver places a scope into your nose to see inside your nose and throat  You may need a DISE to find the cause of your sleep apnea  DISE helps your caregiver diagnose your condition and create a treatment plan  You might also have surgery or other treatments during a DISE  AFTER YOU LEAVE:     Follow up with your primary healthcare provider or specialist as directed:  Write down your questions so you remember to ask them during your visits  Medicines:   · Keep a current list of your medicines:  Include the amounts, and when, how, and why you take them  Take the list or the pill bottles to follow-up visits  Carry your medicine list with you in case of an emergency  Throw away old medicine lists  Use vitamins, herbs, or food supplements only as directed  · Take your medicine as directed:  Call your healthcare provider if you think your medicine is not working as expected  Tell him about any medicine allergies, and if you want to quit taking or change your medicine  Nutrition:  Most people eat and drink as usual after a laryngoscopy  Ask your primary healthcare provider if you are not sure  Contact your primary healthcare provider if:  · You have problems breathing or talking  · You see new injuries to your teeth, lips, or tongue  · Your pain does not go away or gets worse  · You have questions about your procedure, medicine, or care  If you have any questions or concerns during business hours please call our office at [phone]   After hours please call the surgeon on call or Dr Imani Cho at [phone]

## 2021-06-24 NOTE — ANESTHESIA POSTPROCEDURE EVALUATION
Post-Op Assessment Note    CV Status:  Stable  Pain Score: 7    Pain management: adequate     Mental Status:  Alert and awake   Hydration Status:  Euvolemic   PONV Controlled:  Controlled   Airway Patency:  Patent      Post Op Vitals Reviewed: Yes      Staff: Anesthesiologist, CRNA         No complications documented      BP     Temp     Pulse     Resp      SpO2

## 2021-06-24 NOTE — OP NOTE
OPERATIVE REPORT  PATIENT NAME: Michael Murray    :  1950  MRN: [id number]  Pt Location: [facility] OR ROOM 05    SURGERY DATE: 2021    Surgeon(s) and Role:     * Daya Shipley MD - Primary    Preop Diagnosis:  Obstructive sleep apnea [G47 33]    Post-Op Diagnosis Codes:     * Obstructive sleep apnea [G47 33]    Procedure(s) (LRB):  EXAM UNDER ANESTHESIA (EUA), DISE (N/A)    Specimen(s):  * No specimens in log *    Estimated Blood Loss:   Minimal    Drains:  * No LDAs found *    Anesthesia Type:   General    Operative Indications:  Obstructive sleep apnea [G47 33]  BMI 29    Operative Findings:  V 2 AP  O 2 AP  T 2 AP  E 3 AP    Complications:   None    Procedure and Technique:    PREOPERATIVE DIAGNOSES: Obstructive sleep apnea with positive pressure   intolerance and persistent sleep apnea after CPAP trial    POSTOPERATIVE DIAGNOSES: Same    PROCEDURES: Drug-induced sleep endoscopy (flexible fiberoptic laryngoscopy   with examination under anesthesia)  ANESTHESIA: IV sedation  ESTIMATED BLOOD LOSS: None  COMPLICATIONS: None  INDICATIONS: Michael Murray is a 66y o  year-old female with a history of symptomatic obstructive sleep apnea, who is intolerant and unable to achieve benefit with positive pressure therapy  She presents today for drug-induced sleep endoscopy to better characterize her locations and pattern of obstruction and to predict appropriate medical and/or surgical options moving forward  FINDINGS: There was no evidence of complete concentric palatal obstruction and she does appear to be a candidate anatomically for hypoglossal nerve   stimulation therapy  DESCRIPTION OF PROCEDURE: Michael Murray was brought to the operating room and was anesthetized via the standard drug-induced sleep endoscopy protocol  The propofol infusion rate was startedand gradually increased until conditions that mimic sleep were gradually observed       With the patient not responsive to verbal commands, but still with spontaneous respiration, sleep disordered breathing events and associated desaturations were clearly observed    Under these conditions, the flexible endoscope was inserted to examine both sides of the nose as well as the pharynx and larynx  The VOTE score at baseline was V: 2 partial AP; O: 2 partial AP; T: 2 partial AP; E: 3 complete AP  With simulated jaw advancement and tongue advancement, the hypopharyngeal obstruction and secondarily the palatal collapse also improved  In summary, there was no evidence of complete concentric palatal obstruction and she does appear to be a candidate anatomically for hypoglossal nerve stimulation therapy  The patient was then allowed to awaken while monitoring by anesthesia was performed until he was awake and able to maintain his own saturations  The patient was taken to the recovery area in stable condition  All instruments and sponge counts were correct at the end of the procedure  Kusum Coy MD, was present for and performed all key elements of the procedure           I was present for the entire procedure    Patient Disposition:  PACU  and hemodynamically stable    SIGNATURE: Alicja Newman MD  DATE: June 24, 2021  TIME: 10:18 AM

## 2021-06-28 ENCOUNTER — OFFICE VISIT (OUTPATIENT)
Dept: FAMILY MEDICINE CLINIC | Facility: CLINIC | Age: 71
End: 2021-06-28
Payer: MEDICARE

## 2021-06-28 VITALS
RESPIRATION RATE: 16 BRPM | BODY MASS INDEX: 30.12 KG/M2 | SYSTOLIC BLOOD PRESSURE: 132 MMHG | DIASTOLIC BLOOD PRESSURE: 72 MMHG | HEART RATE: 85 BPM | OXYGEN SATURATION: 98 % | TEMPERATURE: 97.6 F | HEIGHT: 63 IN | WEIGHT: 170 LBS

## 2021-06-28 DIAGNOSIS — R32 INCONTINENCE IN FEMALE: Primary | ICD-10-CM

## 2021-06-28 DIAGNOSIS — F33.9 DEPRESSION, RECURRENT (HCC): ICD-10-CM

## 2021-06-28 DIAGNOSIS — R30.0 DYSURIA: ICD-10-CM

## 2021-06-28 LAB
BACTERIA UR QL AUTO: ABNORMAL /HPF
BILIRUB UR QL STRIP: NEGATIVE
CLARITY UR: CLEAR
COLOR UR: YELLOW
GLUCOSE UR STRIP-MCNC: NEGATIVE MG/DL
HGB UR QL STRIP.AUTO: NEGATIVE
HYALINE CASTS #/AREA URNS LPF: ABNORMAL /LPF
KETONES UR STRIP-MCNC: NEGATIVE MG/DL
LEUKOCYTE ESTERASE UR QL STRIP: ABNORMAL
NITRITE UR QL STRIP: NEGATIVE
NON-SQ EPI CELLS URNS QL MICRO: ABNORMAL /HPF
PH UR STRIP.AUTO: 6.5 [PH]
PROT UR STRIP-MCNC: NEGATIVE MG/DL
RBC #/AREA URNS AUTO: ABNORMAL /HPF
SL AMB  POCT GLUCOSE, UA: NORMAL
SL AMB LEUKOCYTE ESTERASE,UA: NORMAL
SL AMB POCT BILIRUBIN,UA: NORMAL
SL AMB POCT BLOOD,UA: NORMAL
SL AMB POCT CLARITY,UA: CLEAR
SL AMB POCT COLOR,UA: YELLOW
SL AMB POCT KETONES,UA: NORMAL
SL AMB POCT NITRITE,UA: NORMAL
SL AMB POCT PH,UA: 6.5
SL AMB POCT SPECIFIC GRAVITY,UA: 1.01
SL AMB POCT URINE PROTEIN: 15
SL AMB POCT UROBILINOGEN: 0.2
SP GR UR STRIP.AUTO: 1.01 (ref 1–1.03)
UROBILINOGEN UR QL STRIP.AUTO: 0.2 E.U./DL
WBC #/AREA URNS AUTO: ABNORMAL /HPF

## 2021-06-28 PROCEDURE — 87086 URINE CULTURE/COLONY COUNT: CPT | Performed by: FAMILY MEDICINE

## 2021-06-28 PROCEDURE — 81003 URINALYSIS AUTO W/O SCOPE: CPT | Performed by: FAMILY MEDICINE

## 2021-06-28 PROCEDURE — 81001 URINALYSIS AUTO W/SCOPE: CPT | Performed by: FAMILY MEDICINE

## 2021-06-28 PROCEDURE — 99214 OFFICE O/P EST MOD 30 MIN: CPT | Performed by: FAMILY MEDICINE

## 2021-06-28 NOTE — ASSESSMENT & PLAN NOTE
Doing well on continued cymbalta 90 mg (was initially prescribed for fibromyalgia) with additional seroquel 25 mg nightly  Continue same  Follow up in four months  Coping well with health-related stressors

## 2021-06-28 NOTE — PROGRESS NOTES
Assessment/Plan:       Problem List Items Addressed This Visit        Other    Depression, recurrent ([address] )     Doing well on continued cymbalta 90 mg (was initially prescribed for fibromyalgia) with additional seroquel 25 mg nightly  Continue same  Follow up in four months  Coping well with health-related stressors  Other Visit Diagnoses     Incontinence in female    -  Primary    Relevant Orders    Ambulatory referral to Urogynecology    Urinalysis with microscopic    Urine culture    Dysuria        Relevant Orders    POCT urine dip auto non-scope (Completed)    Urinalysis with microscopic    Urine culture         longstanding urinary incontinence that is worsening to the point of pads not being able to hold the urine  occ much milder fecal incontinence of small amounts  No changes in back pain  No saddle anesthesia   Check ua and culture  Refer to urogyn  She has a GI appointment upcoming to review  Subjective:     Pearl Galvan is a 66 y o  female here today with chief complaint below:  Chief Complaint   Patient presents with    Follow-up     mood follow up      - CC above per clinical staff and reviewed  HPI:  Patient is here to follow up onmood  She has been taking cymbalta total 90 mg daily (which has been prescribed to her for fibromyalgia) with additional seroquel 25 mg at nighttime that was added one month ago  She notes that her mood is overall doing better  Enjoyed being on vacation with  and son, which she was initially hesitant about  She notes that she is having some incontinence of both stool and urine  She says she has been incontinent of urine for a long time, but stool in the past few months  No saddle anesthesia    She follows with urology with [facility]  He prescribed medication but it is costly    She notes that if she is constipated, she has no stool incontinence  If her stools are softer, she has incontinence     It is not a full bowel movement, but just enough that she needs to wear a pad  With her urine- she will have a full urination    She does not wish to use CPAP anymore  Is being evaluated for the inspire device  Finds she needs to nap during the day  Not using cpap  The following portions of the patient's history were reviewed and updated as appropriate: allergies, current medications, past family history, past medical history, past social history, past surgical history and problem list     ROS:  Review of Systems   No fever, chills, congestion, chest pain, shortness of breath, nausea, vomiting, diarrhea, constipation, blood in stool, urinary concerns, mood changes  Rest of ROS neg except as above  Objective:      /72   Pulse 85   Temp 97 6 °F (36 4 °C)   Resp 16   Ht 5' 3" (1 6 m)   Wt 77 1 kg (170 lb)   SpO2 98%   BMI 30 11 kg/m²   BP Readings from Last 3 Encounters:   06/28/21 132/72   06/24/21 145/67   06/08/21 116/68     Wt Readings from Last 3 Encounters:   06/28/21 77 1 kg (170 lb)   06/24/21 75 8 kg (167 lb)   06/09/21 75 3 kg (166 lb)               Physical Exam:   Physical Exam  Vitals and nursing note reviewed  Constitutional:       Appearance: Normal appearance  She is well-developed  HENT:      Head: Normocephalic and atraumatic  Cardiovascular:      Rate and Rhythm: Normal rate and regular rhythm  Heart sounds: Normal heart sounds  No murmur heard  Pulmonary:      Effort: Pulmonary effort is normal  No respiratory distress  Breath sounds: Normal breath sounds  No wheezing  Abdominal:      Palpations: Abdomen is soft  Tenderness: There is no abdominal tenderness  There is no guarding or rebound  Musculoskeletal:      Cervical back: Neck supple  Lymphadenopathy:      Cervical: No cervical adenopathy  Skin:     General: Skin is warm and dry  Neurological:      Mental Status: She is alert and oriented to person, place, and time     Psychiatric:         Behavior: Behavior normal

## 2021-06-29 LAB — BACTERIA UR CULT: NORMAL

## 2021-06-30 ENCOUNTER — PATIENT MESSAGE (OUTPATIENT)
Dept: FAMILY MEDICINE CLINIC | Facility: CLINIC | Age: 71
End: 2021-06-30

## 2021-06-30 NOTE — TELEPHONE ENCOUNTER
From: Dafne Smith  To: Leticia Chicas MD  Sent: 6/30/2021 12:12 PM EDT  Subject: Non-Urgent Medical Question    Do I need any medication?  Still a little itchey

## 2021-07-02 ENCOUNTER — PATIENT MESSAGE (OUTPATIENT)
Dept: FAMILY MEDICINE CLINIC | Facility: CLINIC | Age: 71
End: 2021-07-02

## 2021-07-02 ENCOUNTER — OFFICE VISIT (OUTPATIENT)
Dept: URGENT CARE | Facility: CLINIC | Age: 71
End: 2021-07-02
Payer: MEDICARE

## 2021-07-02 VITALS
TEMPERATURE: 97.1 F | OXYGEN SATURATION: 96 % | WEIGHT: 177 LBS | HEART RATE: 64 BPM | SYSTOLIC BLOOD PRESSURE: 112 MMHG | BODY MASS INDEX: 31.36 KG/M2 | RESPIRATION RATE: 20 BRPM | DIASTOLIC BLOOD PRESSURE: 59 MMHG | HEIGHT: 63 IN

## 2021-07-02 DIAGNOSIS — R30.0 BURNING WITH URINATION: Primary | ICD-10-CM

## 2021-07-02 DIAGNOSIS — N89.8 VAGINAL ITCHING: ICD-10-CM

## 2021-07-02 LAB
SL AMB  POCT GLUCOSE, UA: NEGATIVE
SL AMB LEUKOCYTE ESTERASE,UA: ABNORMAL
SL AMB POCT BILIRUBIN,UA: NEGATIVE
SL AMB POCT BLOOD,UA: NEGATIVE
SL AMB POCT CLARITY,UA: CLEAR
SL AMB POCT COLOR,UA: YELLOW
SL AMB POCT KETONES,UA: NEGATIVE
SL AMB POCT NITRITE,UA: NEGATIVE
SL AMB POCT PH,UA: 5
SL AMB POCT SPECIFIC GRAVITY,UA: 1
SL AMB POCT URINE PROTEIN: NEGATIVE
SL AMB POCT UROBILINOGEN: 0.2

## 2021-07-02 PROCEDURE — G0463 HOSPITAL OUTPT CLINIC VISIT: HCPCS | Performed by: NURSE PRACTITIONER

## 2021-07-02 PROCEDURE — 99213 OFFICE O/P EST LOW 20 MIN: CPT | Performed by: NURSE PRACTITIONER

## 2021-07-02 PROCEDURE — 81002 URINALYSIS NONAUTO W/O SCOPE: CPT

## 2021-07-02 NOTE — TELEPHONE ENCOUNTER
Triaged using Telephone Triage Protocols for Nurse, Fifth Edition, by Robyn Kraft  Triaged via the Vaginal Discharge/Pain/Itching category, page [phone]  Outcome:Care Now for evaluation and treatment    Patient c/o vaginal itching, pelvic pressure, urine frequency  Patient denied odor, discharge, burning, fever, nausea, vomiting

## 2021-07-02 NOTE — PATIENT INSTRUCTIONS
--Regular fluids  --Given recent negative urine culture results, would hold on antibiotic for now  --Can trial OTC treatment for vaginal yeast infection (Monistat, etc)  --Advise scheduling f/u with PCP if ongoing symptoms over the next 3-5 days despite these measures  --Go to ER if worse

## 2021-07-02 NOTE — PROGRESS NOTES
[facility]        NAME: Manny Murphy is a 66 y o  female  : 1950    MRN: [id number]  DATE: 2021  TIME: 4:54 PM    Assessment and Plan   Burning with urination [R30 0]  1  Burning with urination  POCT urine dip   2  Vaginal itching         --Urine dip showing 2+ leuks, negative nitrate  Recent negative urine culture  Consider post-menopausal vaginitis, vaginal candidiasis, other etiology  Address per below  Patient Instructions     --Regular fluids  --Given recent negative urine culture results, would hold on antibiotic for now  --Can trial OTC treatment for vaginal yeast infection (Monistat, etc)  --Advise scheduling f/u with PCP if ongoing symptoms over the next 3-5 days despite these measures  Would likely benefit from GYN  exam    --Go to ER if worse  Chief Complaint     Chief Complaint   Patient presents with    Possible UTI     burning x last night,sample given not enough to send out         History of Present Illness         Here with  for complaints of burning with urination, vaginal itching, mild bladder pressure, urgency x 1 week  Seen by PCP on   Mentioned these issues  Urine culture sent which came back negative, so no antibiotic prescribed  Symptoms no better, no worse  No vaginal discharge  No fever/chills, N/V, back pain  No change in urine odor, appearance  Baseline urinary incontinence  Review of Systems   Review of Systems   Constitutional: Negative for fever  Gastrointestinal: Negative for abdominal pain and vomiting     Genitourinary:        Per HPI         Current Medications       Current Outpatient Medications:     amLODIPine (NORVASC) 5 mg tablet, TAKE 1 TABLET BY MOUTH EVERY DAY, Disp: 90 tablet, Rfl: 3    b complex vitamins tablet, Take 1 tablet by mouth daily , Disp: , Rfl:     doxazosin (CARDURA) 2 mg tablet, Take 2 tablets (4 mg total) by mouth daily at bedtime (Patient taking differently: Take 2 mg by mouth daily at bedtime ), Disp: 180 tablet, Rfl: 3    DULoxetine (Cymbalta) 30 mg delayed release capsule, Take 30 mg by mouth daily, Disp: , Rfl:     DULoxetine (CYMBALTA) 60 mg delayed release capsule, Take 90 mg by mouth daily, Disp: , Rfl:     famotidine (PEPCID) 40 MG tablet, Take 1 tablet (40 mg total) by mouth daily Take daily in am, Disp: 90 tablet, Rfl: 3    fluticasone (FLONASE) 50 mcg/act nasal spray, 1-2 sprays daily, Disp: , Rfl:     gabapentin (NEURONTIN) 300 mg capsule, Take 300 mg by mouth daily , Disp: , Rfl:     Icosapent Ethyl (Vascepa) 1 g CAPS, Take 1 g by mouth 2 (two) times a day, Disp: , Rfl:     insulin aspart (NovoLOG FlexPen) 100 UNIT/ML injection pen, Inject 18-24 Units under the skin, Disp: , Rfl:     insulin aspart (NovoLOG) 100 units/mL injection, Inject under the skin 3 (three) times a day before meals 14 units, 14 units, 18 units  , Disp: , Rfl:     insulin detemir (Levemir FlexTouch) 100 Units/mL injection pen, Inject 45 Units under the skin, Disp: , Rfl:     insulin detemir (LEVEMIR) 100 units/mL subcutaneous injection, Inject 32 Units under the skin daily at bedtime (Patient taking differently: Inject 40 Units under the skin daily at bedtime ), Disp: , Rfl: 0    levothyroxine 137 mcg tablet, Take 137 mcg by mouth, Disp: , Rfl:     metoprolol tartrate (LOPRESSOR) 50 mg tablet, Take 1 tablet (50 mg total) by mouth every 12 (twelve) hours, Disp: 180 tablet, Rfl: 3    Multiple Vitamin (multivitamin) capsule, Take 1 capsule by mouth daily, Disp: , Rfl:     olmesartan (BENICAR) 40 mg tablet, Take 1 tablet (40 mg total) by mouth daily, Disp: 90 tablet, Rfl: 0    pantoprazole (PROTONIX) 20 mg tablet, Take 1 tablet (20 mg total) by mouth daily, Disp: , Rfl:     pramipexole (MIRAPEX) 0 25 mg tablet, Take 1 tablet (0 25 mg total) by mouth daily, Disp: 90 tablet, Rfl: 3    QUEtiapine (SEROquel) 25 mg tablet, Take 1 tablet (25 mg total) by mouth daily at bedtime, Disp: 30 tablet, Rfl: 2   rosuvastatin (CRESTOR) 5 mg tablet, TAKE 1 TABLET BY MOUTH EVERY DAY AT NIGHT, Disp: , Rfl:     torsemide (DEMADEX) 20 mg tablet, Take 1 tablet (20 mg total) by mouth daily, Disp: 90 tablet, Rfl: 3    Vitamin D, Ergocalciferol, 2000 units CAPS, Take 2,000 Units by mouth daily , Disp: , Rfl:     acetaminophen (TYLENOL) 500 mg tablet, Take 1,000 mg by mouth as needed , Disp: , Rfl:     albuterol (Ventolin HFA) 90 mcg/act inhaler, Inhale 2 puffs every 6 (six) hours as needed for wheezing or shortness of breath, Disp: , Rfl: 0    B-D ULTRAFINE III SHORT PEN 31G X 8 MM MISC, USE AS DIRECTED 4 TIMES PER DAY, Disp: , Rfl: 3    Current Allergies     Allergies as of 07/02/2021 - Reviewed 07/02/2021   Allergen Reaction Noted    Pollen extract  05/02/2020    Ciprofloxacin Hives and Itching 09/19/2018            The following portions of the patient's history were reviewed and updated as appropriate: allergies, current medications, past family history, past medical history, past social history, past surgical history and problem list      Past Medical History:   Diagnosis Date    Allergic     Anxiety     Arthritis     Aspiration into airway     Basal cell carcinoma 2007    left cheek     BCC (basal cell carcinoma) 05/27/2021    Left Nasal tip    Chronic kidney disease 2000, 2018    Stones, kidney disease stage 4    CPAP (continuous positive airway pressure) dependence     Diabetes mellitus (HCC)     Disease of thyroid gland     Family history of thyroid problem     GERD (gastroesophageal reflux disease)     Heart burn     Hyperlipidemia     Hyperplasia, parathyroid ([address] )     Hypertension     Kidney problem     Kidney stone     Obesity (BMI 30 0-34  9)     Pneumonia     RLS (restless legs syndrome)     SCC (squamous cell carcinoma) 05/04/2021    left mid forehead    Seasonal allergies     Sleep apnea     uses CPAP    Squamous cell skin cancer 2007    left cheek     Swollen ankles        Past Surgical History:   Procedure Laterality Date    ARTHROSCOPY KNEE      BREAST BIOPSY      stereotactic-benign    BREAST BIOPSY      stereo-benign    BREAST EXCISIONAL BIOPSY      unknown date-benign    BREAST EXCISIONAL BIOPSY      unknown date-benign    BREAST EXCISIONAL BIOPSY      unknown date-benign    BREAST EXCISIONAL BIOPSY      unknown age-benign    CHOLECYSTECTOMY      EXAMINATION UNDER ANESTHESIA N/A 6/24/2021    Procedure: EXAM UNDER ANESTHESIA (EUA), DISE;  Surgeon: Sai Tompkins MD;  [facility];  Service: ENT    HERNIA REPAIR      HIATAL HERNIA REPAIR      LIPOSUCTION      MOHS SURGERY  05/20/2021    left mid forehead-Gautam    MOHS SURGERY Left 05/27/2021    Left nasal tip- gautam     REDUCTION MAMMAPLASTY Bilateral 2000    SKIN BIOPSY      SKIN CANCER EXCISION  2007    squamous cell carcinoma     SKIN CANCER EXCISION  2007    basal cell carcinoma    SQUAMOUS CELL CARCINOMA EXCISION      TOE SURGERY      TONSILLECTOMY      TUBAL LIGATION         Family History   Problem Relation Age of Onset    Heart disease Mother     Depression Mother     Hypertension Mother     COPD Mother     Hearing loss Mother     Heart disease Father     Lung cancer Father 79        Smoker     Cancer Father         brain    Alcohol abuse Father     Dementia Father     Hypertension Father     Thyroid disease Father     COPD Father     Arthritis Father     Brain cancer Father 76    Hypertension Sister     Diabetes Sister     Heart disease Sister     Thyroid disease Sister     Cancer Sister         Lympoma    Lung cancer Sister 78    Hypertension Brother     Diabetes Brother     Cancer Brother         Throat    Dementia Brother     Stroke Brother     Hypertension Brother     No Known Problems Son     No Known Problems Son     Heart disease Brother     Diabetes Brother     Brain cancer Paternal Aunt         unknown age         Medications have been verified  Objective   /59   Pulse 64   Temp (!) 97 1 °F (36 2 °C)   Resp 20   Ht 5' 3" (1 6 m)   Wt 80 3 kg (177 lb)   SpO2 96%   BMI 31 35 kg/m²   No LMP recorded  Patient is postmenopausal        Physical Exam     Physical Exam  Constitutional:       General: She is not in acute distress  Appearance: She is not ill-appearing  Pulmonary:      Effort: Pulmonary effort is normal    Abdominal:      Tenderness: There is no right CVA tenderness or left CVA tenderness  Neurological:      Mental Status: She is alert     Psychiatric:         Mood and Affect: Mood normal

## 2021-07-02 NOTE — TELEPHONE ENCOUNTER
From: Kali Renteria  To: Bobby Cartwright MD  Sent: 7/2/2021 12:19 PM EDT  Subject: Non-Urgent Medical Question    Slight itching some pressure      ----- Message -----   From:Susu Gale MD   Sent:6/30/2021 1:16 PM EDT   To:Gabriella Fuentes   Subject:RE: Non-Urgent Medical Question    You don't need medication based on the urine studies  Are you having external genital itching? Any vaginal discharge or bleeding? Surinder Gale MD      ----- Message -----   From:Gabriella Fuentes   Sent:6/30/2021 12:12 PM EDT   To:Susu Gale MD   Subject:Non-Urgent Medical Question    Do I need any medication?  Still a little itchey

## 2021-07-03 ENCOUNTER — HOSPITAL ENCOUNTER (EMERGENCY)
Facility: HOSPITAL | Age: 71
Discharge: HOME | End: 2021-07-03
Attending: EMERGENCY MEDICINE
Payer: MEDICARE

## 2021-07-03 VITALS
DIASTOLIC BLOOD PRESSURE: 72 MMHG | RESPIRATION RATE: 16 BRPM | HEART RATE: 67 BPM | BODY MASS INDEX: 31.25 KG/M2 | OXYGEN SATURATION: 97 % | WEIGHT: 176.37 LBS | TEMPERATURE: 98.2 F | SYSTOLIC BLOOD PRESSURE: 163 MMHG | HEIGHT: 63 IN

## 2021-07-03 DIAGNOSIS — N76.0 VULVOVAGINITIS: Primary | ICD-10-CM

## 2021-07-03 DIAGNOSIS — Z94.5 H/O SKIN GRAFT: ICD-10-CM

## 2021-07-03 PROCEDURE — 99284 EMERGENCY DEPT VISIT MOD MDM: CPT | Performed by: PHYSICIAN ASSISTANT

## 2021-07-03 PROCEDURE — 99283 EMERGENCY DEPT VISIT LOW MDM: CPT

## 2021-07-03 RX ORDER — FLUCONAZOLE 100 MG/1
200 TABLET ORAL ONCE
Status: COMPLETED | OUTPATIENT
Start: 2021-07-03 | End: 2021-07-03

## 2021-07-03 RX ADMIN — FLUCONAZOLE 200 MG: 100 TABLET ORAL at 09:44

## 2021-07-03 NOTE — ED PROVIDER NOTES
History  Chief Complaint   Patient presents with    Skin Problem     Pt presents to ED from home after getting skin graft on nose one month ago  Pt woke today w/ red nose  The patient is a 50-year-old female with history of hypertension, hyperlipidemia and diabetes who presents to the emergency department for evaluation of 2 separate complaints  The 1st complaint is regarding a skin graft of her nose  The patient had a skin graft done on 05/27 due to basal cell carcinoma of her nose  She states this was done by Dr Cynthia Winters  The patient states that when she woke up today, she felt that the area of the skin graft looked red and more purple in color than it previously had  She denies that it is painful  She does state that she wore a mask for a significant amount of the day yesterday because she was at an urgent care, and she thought maybe that could have caused some irritation  She is additionally expressing concern regarding her 2nd complaint, which is vaginal burning she has been experiencing over the last several days  She states she was at the urgent care yesterday to be checked for UTI, however she was told she did not have a UTI at that time  She is now concerned that if she has a yeast infection, she could have accidentally touched her nose and exposed to yeast   Regarding the vaginal burning, she denies any discharge or other UTI symptoms  The patient additionally denies fever, chills, chest pain, shortness of breath, abdominal pain, nausea, vomiting, headache, dizziness or lightheadedness  History provided by:  Patient   used: No        Prior to Admission Medications   Prescriptions Last Dose Informant Patient Reported? Taking?    B-D ULTRAFINE III SHORT PEN 31G X 8 MM MISC  Self Yes No   Sig: USE AS DIRECTED 4 TIMES PER DAY   DULoxetine (CYMBALTA) 60 mg delayed release capsule  Self Yes No   Sig: Take 90 mg by mouth daily   DULoxetine (Cymbalta) 30 mg delayed release capsule   Yes No   Sig: Take 30 mg by mouth daily   Icosapent Ethyl (Vascepa) 1 g CAPS  Self Yes No   Sig: Take 1 g by mouth 2 (two) times a day   Multiple Vitamin (multivitamin) capsule   Yes No   Sig: Take 1 capsule by mouth daily   QUEtiapine (SEROquel) 25 mg tablet   No No   Sig: Take 1 tablet (25 mg total) by mouth daily at bedtime   Vitamin D, Ergocalciferol, 2000 units CAPS  Self Yes No   Sig: Take 2,000 Units by mouth daily    acetaminophen (TYLENOL) 500 mg tablet  Self Yes No   Sig: Take 1,000 mg by mouth as needed    albuterol (Ventolin HFA) 90 mcg/act inhaler  Self No No   Sig: Inhale 2 puffs every 6 (six) hours as needed for wheezing or shortness of breath   amLODIPine (NORVASC) 5 mg tablet   No No   Sig: TAKE 1 TABLET BY MOUTH EVERY DAY   b complex vitamins tablet  Self Yes No   Sig: Take 1 tablet by mouth daily    doxazosin (CARDURA) 2 mg tablet   No No   Sig: Take 2 tablets (4 mg total) by mouth daily at bedtime   Patient taking differently: Take 2 mg by mouth daily at bedtime    famotidine (PEPCID) 40 MG tablet   No No   Sig: Take 1 tablet (40 mg total) by mouth daily Take daily in am   fluticasone (FLONASE) 50 mcg/act nasal spray  Self Yes No   Si-2 sprays daily   gabapentin (NEURONTIN) 300 mg capsule  Self Yes No   Sig: Take 300 mg by mouth daily    insulin aspart (NovoLOG FlexPen) 100 UNIT/ML injection pen   Yes No   Sig: Inject 18-24 Units under the skin   insulin aspart (NovoLOG) 100 units/mL injection  Self Yes No   Sig: Inject under the skin 3 (three) times a day before meals 14 units, 14 units, 18 units     insulin detemir (LEVEMIR) 100 units/mL subcutaneous injection  Self No No   Sig: Inject 32 Units under the skin daily at bedtime   Patient taking differently: Inject 40 Units under the skin daily at bedtime    insulin detemir (Levemir FlexTouch) 100 Units/mL injection pen   Yes No   Sig: Inject 45 Units under the skin   levothyroxine 137 mcg tablet   Yes No   Sig: Take 137 mcg by mouth metoprolol tartrate (LOPRESSOR) 50 mg tablet   No No   Sig: Take 1 tablet (50 mg total) by mouth every 12 (twelve) hours   olmesartan (BENICAR) 40 mg tablet   No No   Sig: Take 1 tablet (40 mg total) by mouth daily   pantoprazole (PROTONIX) 20 mg tablet   No No   Sig: Take 1 tablet (20 mg total) by mouth daily   pramipexole (MIRAPEX) 0 25 mg tablet  Self No No   Sig: Take 1 tablet (0 25 mg total) by mouth daily   rosuvastatin (CRESTOR) 5 mg tablet  Self Yes No   Sig: TAKE 1 TABLET BY MOUTH EVERY DAY AT NIGHT   torsemide (DEMADEX) 20 mg tablet  Self No No   Sig: Take 1 tablet (20 mg total) by mouth daily      Facility-Administered Medications: None       Past Medical History:   Diagnosis Date    Allergic     Anxiety     Arthritis     Aspiration into airway     Basal cell carcinoma 2007    left cheek     BCC (basal cell carcinoma) 05/27/2021    Left Nasal tip    Chronic kidney disease 2000, 2018    Stones, kidney disease stage 4    CPAP (continuous positive airway pressure) dependence     Diabetes mellitus (HCC)     Disease of thyroid gland     Family history of thyroid problem     GERD (gastroesophageal reflux disease)     Heart burn     Hyperlipidemia     Hyperplasia, parathyroid ([address] )     Hypertension     Kidney problem     Kidney stone     Obesity (BMI 30 0-34  9)     Pneumonia     RLS (restless legs syndrome)     SCC (squamous cell carcinoma) 05/04/2021    left mid forehead    Seasonal allergies     Sleep apnea     uses CPAP    Squamous cell skin cancer 2007    left cheek     Swollen ankles        Past Surgical History:   Procedure Laterality Date    ARTHROSCOPY KNEE      BREAST BIOPSY      stereotactic-benign    BREAST BIOPSY      stereo-benign    BREAST EXCISIONAL BIOPSY      unknown date-benign    BREAST EXCISIONAL BIOPSY      unknown date-benign    BREAST EXCISIONAL BIOPSY      unknown date-benign    BREAST EXCISIONAL BIOPSY      unknown age-benign    CHOLECYSTECTOMY      EXAMINATION UNDER ANESTHESIA N/A 6/24/2021    Procedure: EXAM UNDER ANESTHESIA (EUA), DISE;  Surgeon: Eros Fried MD;  [facility];  Service: ENT    HERNIA REPAIR      HIATAL HERNIA REPAIR      LIPOSUCTION      MOHS SURGERY  05/20/2021    left mid forehead-Gautam    MOHS SURGERY Left 05/27/2021    Left nasal tip- gautam     REDUCTION MAMMAPLASTY Bilateral 2000    SKIN BIOPSY      SKIN CANCER EXCISION  2007    squamous cell carcinoma     SKIN CANCER EXCISION  2007    basal cell carcinoma    SQUAMOUS CELL CARCINOMA EXCISION      TOE SURGERY      TONSILLECTOMY      TUBAL LIGATION         Family History   Problem Relation Age of Onset    Heart disease Mother     Depression Mother     Hypertension Mother     COPD Mother     Hearing loss Mother     Heart disease Father     Lung cancer Father 79        Smoker     Cancer Father         brain    Alcohol abuse Father     Dementia Father     Hypertension Father     Thyroid disease Father     COPD Father     Arthritis Father     Brain cancer Father 76    Hypertension Sister     Diabetes Sister     Heart disease Sister     Thyroid disease Sister     Cancer Sister         Lympoma    Lung cancer Sister 78    Hypertension Brother     Diabetes Brother     Cancer Brother         Throat    Dementia Brother     Stroke Brother     Hypertension Brother     No Known Problems Son     No Known Problems Son     Heart disease Brother     Diabetes Brother     Brain cancer Paternal Aunt         unknown age     I have reviewed and agree with the history as documented      E-Cigarette/Vaping    E-Cigarette Use Never User      E-Cigarette/Vaping Substances    Nicotine No     THC No     CBD No     Flavoring No     Other No     Unknown No      Social History     Tobacco Use    Smoking status: Former Smoker     Packs/day: 2 00     Years: 10 00     Pack years: 20 00     Types: Cigarettes     Start date: 11/1/1967     Quit date: 1976 Years since quittin 7    Smokeless tobacco: Never Used   Vaping Use    Vaping Use: Never used   Substance Use Topics    Alcohol use: Not Currently     Alcohol/week: 0 0 standard drinks    Drug use: No       Review of Systems   Constitutional: Negative for chills and fever  HENT: Negative for ear pain and sore throat  Eyes: Negative for redness and visual disturbance  Respiratory: Negative for cough and shortness of breath  Cardiovascular: Negative for chest pain  Gastrointestinal: Negative for abdominal pain, diarrhea, nausea and vomiting  Genitourinary: Positive for vaginal pain  Negative for dysuria, hematuria, vaginal bleeding and vaginal discharge  Musculoskeletal: Negative for back pain, neck pain and neck stiffness  Skin: Positive for color change  Negative for rash and wound  Neurological: Negative for dizziness, light-headedness and headaches  All other systems reviewed and are negative  Physical Exam  Physical Exam  Vitals and nursing note reviewed  Exam conducted with a chaperone present  Constitutional:       General: She is not in acute distress  Appearance: She is well-developed  She is not ill-appearing or toxic-appearing  HENT:      Head: Normocephalic and atraumatic  Nose: No nasal tenderness  Mouth/Throat:      Pharynx: Uvula midline  Eyes:      General: Lids are normal       Conjunctiva/sclera: Conjunctivae normal    Pulmonary:      Effort: Pulmonary effort is normal  No respiratory distress  Genitourinary:     Exam position: Supine  Comments: Labia appears to be slightly erythematous with no lesions  There is no noted vaginal discharge  Musculoskeletal:         General: Normal range of motion  Cervical back: Normal range of motion and neck supple  Skin:     General: Skin is warm and dry  Neurological:      General: No focal deficit present  Mental Status: She is alert and oriented to person, place, and time  Vital Signs  ED Triage Vitals [07/03/21 0806]   Temperature Pulse Respirations Blood Pressure SpO2   98 2 °F (36 8 °C) 67 16 163/72 97 %      Temp Source Heart Rate Source Patient Position - Orthostatic VS BP Location FiO2 (%)   Oral -- -- -- --      Pain Score       --           Vitals:    07/03/21 0806 07/03/21 0815   BP: 163/72 163/72   Pulse: 67          Visual Acuity      ED Medications  Medications   fluconazole (DIFLUCAN) tablet 200 mg (200 mg Oral Given 7/3/21 0944)       Diagnostic Studies  Results Reviewed     None                 No orders to display              Procedures  Procedures         ED Course                                           MDM  Number of Diagnoses or Management Options  H/O skin graft: new and requires workup  Vulvovaginitis: new and requires workup  Diagnosis management comments: Patient presents for evaluation of 2 different complaints  The 1st being erythema around her recent skin graft on her nose  The 2nd being vaginal burning  Regarding the skin graft, I consult to Dermatology  A picture was sent  The dermatologist who performed the skin graft was able to review the picture and the patient's concerns, and she felt that the skin graft is still well-appearing  She advised that the patient continue to wear sunscreen and hats as much as possible to protect the skin graft from the sun  Patient acknowledged understanding, she is comfortable with the plan  Regarding the patient's vaginal complaints, I do not see anything concern on exam   I suspect vulvovaginitis versus vaginal atrophy  Advised the patient we will try a dose of Diflucan, however she does not get significant relief of symptoms with that, she should follow up with gynecology for further evaluation  She is agreeable to this plan  Patient was given a dose of Diflucan in the ED  Patient is stable for discharge         Amount and/or Complexity of Data Reviewed  Decide to obtain previous medical records or to obtain history from someone other than the patient: yes  Review and summarize past medical records: yes  Discuss the patient with other providers: yes (Dermatology)    Risk of Complications, Morbidity, and/or Mortality  Presenting problems: low  Diagnostic procedures: low  Management options: low    Patient Progress  Patient progress: stable      Disposition  Final diagnoses:   Vulvovaginitis   H/O skin graft     Time reflects when diagnosis was documented in both MDM as applicable and the Disposition within this note     Time User Action Codes Description Comment    7/3/2021  9:54 AM Bridgett Angelo Add [C70 418] Skin graft (allograft) (autograft) failure     7/3/2021  9:54 AM Bev Parry Remove [M59 925] Skin graft (allograft) (autograft) failure     7/3/2021  9:54 AM Bev Parry Add [N76 0] Vulvovaginitis     7/3/2021  9:54 AM Bridgett Angelo Add [Z94 5] H/O skin graft       ED Disposition     ED Disposition Condition Date/Time Comment    Discharge Stable Sat Jul 3, 2021  9:53 AM Meagan Benitez discharge to home/self care  Follow-up Information     Follow up With Specialties Details Why Contact Info Additional Information    Marcello Rose MD Family Medicine Schedule an appointment as soon as possible for a visit  As needed [address] [place] [address]  [address]  [place] [address]  [phone]       Please follow-up with OBGYN if no improvement of symptoms in 2-3 days           [facility] Emergency Department Emergency Medicine  If symptoms worsen [address] [facility] [id number] [place] [address] Emergency Department, [address] [facility], [address] Pura Cooper MD Dermatology Schedule an appointment as soon as possible for a visit   [id number] [facility]  [phone]             Discharge Medication List as of 7/3/2021  9:55 AM      CONTINUE these medications which have NOT CHANGED    Details   acetaminophen (TYLENOL) 500 mg tablet Take 1,000 mg by mouth as needed , Historical Med      albuterol (Ventolin HFA) 90 mcg/act inhaler Inhale 2 puffs every 6 (six) hours as needed for wheezing or shortness of breath, Starting Tue 5/5/2020, No Print      amLODIPine (NORVASC) 5 mg tablet TAKE 1 TABLET BY MOUTH EVERY DAY, Normal      b complex vitamins tablet Take 1 tablet by mouth daily , Historical Med      B-D ULTRAFINE III SHORT PEN 31G X 8 MM MISC USE AS DIRECTED 4 TIMES PER DAY, Historical Med      doxazosin (CARDURA) 2 mg tablet Take 2 tablets (4 mg total) by mouth daily at bedtime, Starting Tue 5/25/2021, Normal      !! DULoxetine (Cymbalta) 30 mg delayed release capsule Take 30 mg by mouth daily, Starting Tue 3/2/2021, Until Wed 3/2/2022, Historical Med      !! DULoxetine (CYMBALTA) 60 mg delayed release capsule Take 90 mg by mouth daily, Historical Med      famotidine (PEPCID) 40 MG tablet Take 1 tablet (40 mg total) by mouth daily Take daily in am, Starting Fri 4/2/2021, Normal      fluticasone (FLONASE) 50 mcg/act nasal spray 1-2 sprays daily, Starting Wed 7/1/2020, Historical Med      gabapentin (NEURONTIN) 300 mg capsule Take 300 mg by mouth daily , Starting Wed 6/17/2020, Historical Med      Icosapent Ethyl (Vascepa) 1 g CAPS Take 1 g by mouth 2 (two) times a day, Historical Med      insulin aspart (NovoLOG FlexPen) 100 UNIT/ML injection pen Inject 18-24 Units under the skin, Starting Tue 6/8/2021, Historical Med      insulin aspart (NovoLOG) 100 units/mL injection Inject under the skin 3 (three) times a day before meals 14 units, 14 units, 18 units  , Historical Med      insulin detemir (Levemir FlexTouch) 100 Units/mL injection pen Inject 45 Units under the skin, Starting Tue 6/8/2021, Historical Med      insulin detemir (LEVEMIR) 100 units/mL subcutaneous injection Inject 32 Units under the skin daily at bedtime, Starting Tue 9/29/2020, No Print      levothyroxine 137 mcg tablet Take 137 mcg by mouth, Starting Tue 3/2/2021, Until Wed 3/2/2022, Historical Med      metoprolol tartrate (LOPRESSOR) 50 mg tablet Take 1 tablet (50 mg total) by mouth every 12 (twelve) hours, Starting Mon 4/26/2021, Normal      Multiple Vitamin (multivitamin) capsule Take 1 capsule by mouth daily, Historical Med      olmesartan (BENICAR) 40 mg tablet Take 1 tablet (40 mg total) by mouth daily, Starting Fri 2/5/2021, Normal      pantoprazole (PROTONIX) 20 mg tablet Take 1 tablet (20 mg total) by mouth daily, Starting Sun 4/4/2021, No Print      pramipexole (MIRAPEX) 0 25 mg tablet Take 1 tablet (0 25 mg total) by mouth daily, Starting Tue 11/24/2020, Normal      QUEtiapine (SEROquel) 25 mg tablet Take 1 tablet (25 mg total) by mouth daily at bedtime, Starting Wed 5/26/2021, Normal      rosuvastatin (CRESTOR) 5 mg tablet TAKE 1 TABLET BY MOUTH EVERY DAY AT NIGHT, Historical Med      torsemide (DEMADEX) 20 mg tablet Take 1 tablet (20 mg total) by mouth daily, Starting Wed 8/5/2020, Normal      Vitamin D, Ergocalciferol, 2000 units CAPS Take 2,000 Units by mouth daily , Historical Med       !! - Potential duplicate medications found  Please discuss with provider  No discharge procedures on file      PDMP Review       Value Time User    PDMP Reviewed  Yes 5/17/2021  3:42 AM Joan Chi MD          ED Provider  Electronically Signed by           Cecil Melendez PA-C  07/03/21 1670

## 2021-07-13 ENCOUNTER — OFFICE VISIT (OUTPATIENT)
Dept: DERMATOLOGY | Facility: CLINIC | Age: 71
End: 2021-07-13
Payer: MEDICARE

## 2021-07-13 DIAGNOSIS — L91.0 HYPERTROPHIC SCAR OF SKIN: Primary | ICD-10-CM

## 2021-07-13 PROCEDURE — 11900 INJECT SKIN LESIONS </W 7: CPT | Performed by: STUDENT IN AN ORGANIZED HEALTH CARE EDUCATION/TRAINING PROGRAM

## 2021-07-13 NOTE — PROGRESS NOTES
Pulsed-Dye Laser treatment to nose for the treatment of hypertrophic scar ( BCC left nasal tip skin graft - MOHS on 5/27/2021)  Treatment #: 1    After discussing potential procedure related risks including but not limited to pain, purpura, blistering, scarring, discoloration, footprinting, recurrence, inefficacy, need for additional treatment, and undesirable cosmetic written and verbal consent were obtained  Prior to the procedure, the patient washed his/her face  The treatment area was cleansed with alcohol  Eye protection was in place with metal shields  The patient was treated with  nm laser with these settings: fluence of 4 5 J/cm2, a pulse duration of 1 5 msec, spot size of 10 mm  The pulse count was 10  The clinical endpoint was erythema  Cold compress was applied after the procedure  Patient tolerated the procedure well with no immediate significant complications and left in stable condition  Aftercare was discussed  Continue to ice at home and keep the area moist with a gentle moisturizer  Advised the patient to avoid exercise for the next 24 hours and also to be cautious with sun exposure over the next week  Advised patient to call the office if there is excessive swelling  Patient is aware that it will take multiple treatments to treat this condition  The redness will soften but will not completely resolve  The next treatment will be in 4 weeks  THIS IS A POST MOHS SURGERY LASER TREATMENT, COMPLIMENTARY, DO NOT BILL  PROCEDURE:  INTRALESIONAL STEROID INJECTION (KENALOG INJECTION)    Purpose: Triamcinolone is a synthetic glucocorticoid corticosteroid that has marked anti-inflammatory action  It is prepared in sterile aqueous suspension suitable for injecting directly into a lesion on or immediately below the skin to treat a dermal inflammatory process  Indications:  It is indicated for alopecia areata; inflammatory acne cysts; discoid lupus erythematosus; keloids and hypertrophic scars; inflammatory lesions of granuloma annulare, lichen planus, lichen simplex chronicus (neurodermatitis), psoriatic plaques, and other localized inflammatory skin conditions  Potential Side Effects: I understand that triamcinolone injection can potentially cause early and/or delayed adverse effects such as:    Pain    Impaired wound healing    Increased hair growth    Bleeding    White or brown marks    Steroid acne    Infection    Telangiectasia    Skin thinning    Cutaneous and subcutaneous lipoatrophy (most common) appearing as skin indentations or dimples around the injection sites a few weeks after treatment     PROCEDURE NOTE:  After verbal and written consent were obtained, the to-be-treated area was wiped and cleaned with rubbing alcohol 70%  Then, a total of 0 1 mL of Kenalog CONCENTRATION:  5 mg/mL was injected intralesionally into a total of 1 lesion/s on the following anatomic areas:  nose using a 1-mL syringe and a 30-gauge needle  There was less than 1 mL of blood loss and little to no discomfort  The area was bandaged with a Band-aid  The patient tolerated the procedure well and remained in the office for observation  With no signs of an adverse reaction, the patient was eventually discharged from clinic          Scribe Attestation    I,:  Jacob Hatch am acting as a scribe while in the presence of the attending physician :       I,:  Ezequiel Recio MD personally performed the services described in this documentation    as scribed in my presence :

## 2021-07-13 NOTE — PATIENT INSTRUCTIONS
[facility] PULSED DYE LASER TREATMENT    The Pulsed Dye Laser (V-Beam) delivers intense but gentle pulses of light into selectively targeted areas of the skin  This laser is used to lighten or remove unwanted or abnormal blood vessels in the skin  The pulsed dye laser may be indicated for vascular lesions, broken blood vessels and generalized facial redness caused by rosacea, among other indications  The results of laser therapy vary on the condition that is being treated and the number of treatments required for clearance  There is no downtime however the treated area will show a reddish/bruised discoloration with swelling for usually no more than 48 hours after treatment  Post- Op Care Instructions:  1  Avoid direct sun exposure (for 3-4 days after the procedure) and use a sunscreen of SPF 30 or higher  2  Avoid exercise, hot tubs, saunas, spicy foods for approximately 24 hours after procedure  This will increase the blood flow to the area and make the treatment less effective  3  Do not drink alcohol, take aspirin, ibuprofen, vitamin E, ginseng, gingko biloba, turmeric or fish oils for 3 days before and 3 days after your laser treatment  Using these may increase your likelihood of bruising  4  Tylenol and ice packs may be used for swelling, bruising or discomfort  5  Vaseline should be applied if there is crusting/scabbing  DO NOT pick or scratch the area; let the scab fall off on its own  6  Do not use scrubs, harsh exfoliants to the treated area for one week  Side effects: The treated area may show mild swelling, reddish/bruised discoloration  and redness  Occasionally purpura, laser bruise, may occur; however it will disappear in a few days  The treated area is delicate and should be treated with care  Keep icing the area for swelling  For facial swelling, sleep with your head elevated       When any discoloration or bruising clears, there may be very little change in the treated area   Improvement will take place slowly over a period of weeks  Continue with your set of treatments as discussed for optimal results     Recommended over the counter ScarAway Silicone Bandage

## 2021-07-16 ENCOUNTER — TELEPHONE (OUTPATIENT)
Dept: NEPHROLOGY | Facility: CLINIC | Age: 71
End: 2021-07-16

## 2021-07-16 ENCOUNTER — DOCUMENTATION (OUTPATIENT)
Dept: NEPHROLOGY | Facility: CLINIC | Age: 71
End: 2021-07-16

## 2021-07-16 DIAGNOSIS — K21.9 GERD (GASTROESOPHAGEAL REFLUX DISEASE): ICD-10-CM

## 2021-07-16 DIAGNOSIS — N18.30 CKD (CHRONIC KIDNEY DISEASE) STAGE 3, GFR 30-59 ML/MIN (HCC): ICD-10-CM

## 2021-07-16 DIAGNOSIS — I12.9 HYPERTENSIVE CHRONIC KIDNEY DISEASE WITH STAGE 1 THROUGH STAGE 4 CHRONIC KIDNEY DISEASE, OR UNSPECIFIED CHRONIC KIDNEY DISEASE: ICD-10-CM

## 2021-07-16 RX ORDER — TORSEMIDE 20 MG/1
TABLET ORAL
Qty: 90 TABLET | Refills: 3 | Status: SHIPPED | OUTPATIENT
Start: 2021-07-16 | End: 2022-07-25

## 2021-07-16 NOTE — TELEPHONE ENCOUNTER
I spoke to the patient she is aware that the doxazosin be stopped at this time, work on 1 week of readings after 2 weeks of holding the medication  I advised that patient to call if she continues to have any lightheadedness over the weekend

## 2021-07-16 NOTE — TELEPHONE ENCOUNTER
Given low blood pressure readings I would stop doxazosin  I would recommend waiting 2 weeks and do a full week a blood pressure readings morning evening sitting and standing   Thank you

## 2021-07-16 NOTE — TELEPHONE ENCOUNTER
Pt of Dr Kurt Corona last seen 5/13/2021 for EGD  HX: severe gastroparesis, moderate sized sliding hiatal hernia  Pt requesting pantoprazole 20mg to [facility]

## 2021-07-16 NOTE — TELEPHONE ENCOUNTER
7/15- Sitting 113/63, Standing 105/66  7/16- 115/68, 11am 94/63 lightheaded at the time, @2pm 99/59    Please advise on medication changes   -amlodipine 5mg daily  -doxazosin 2 mg daily  -metoprolol 50 mg twice a day  -omlesartan 40mg daily  -torsemide 20 mg daily

## 2021-07-16 NOTE — TELEPHONE ENCOUNTER
Pt called requesting the follow refill please  pantoprazole (PROTONIX) 20 mg tablet    Requests it be sent to [facility] on [address], not the mail order pharmacy    Thanks so much

## 2021-07-19 RX ORDER — PANTOPRAZOLE SODIUM 20 MG/1
20 TABLET, DELAYED RELEASE ORAL DAILY
Start: 2021-07-19 | End: 2021-08-02 | Stop reason: SDUPTHER

## 2021-07-26 DIAGNOSIS — I12.9 BENIGN HYPERTENSION WITH CKD (CHRONIC KIDNEY DISEASE) STAGE III (HCC): ICD-10-CM

## 2021-07-26 DIAGNOSIS — N18.30 BENIGN HYPERTENSION WITH CKD (CHRONIC KIDNEY DISEASE) STAGE III (HCC): ICD-10-CM

## 2021-07-26 RX ORDER — OLMESARTAN MEDOXOMIL 40 MG/1
TABLET ORAL
Qty: 90 TABLET | Refills: 0 | Status: SHIPPED | OUTPATIENT
Start: 2021-07-26 | End: 2021-10-19

## 2021-07-30 ENCOUNTER — OFFICE VISIT (OUTPATIENT)
Dept: GASTROENTEROLOGY | Facility: AMBULARY SURGERY CENTER | Age: 71
End: 2021-07-30
Payer: MEDICARE

## 2021-07-30 VITALS
HEIGHT: 63 IN | BODY MASS INDEX: 30.44 KG/M2 | DIASTOLIC BLOOD PRESSURE: 80 MMHG | SYSTOLIC BLOOD PRESSURE: 142 MMHG | WEIGHT: 171.8 LBS

## 2021-07-30 DIAGNOSIS — Z86.010 HISTORY OF COLON POLYPS: ICD-10-CM

## 2021-07-30 DIAGNOSIS — K31.84 GASTROPARESIS DIABETICORUM (HCC): ICD-10-CM

## 2021-07-30 DIAGNOSIS — K59.09 CHRONIC CONSTIPATION: Primary | ICD-10-CM

## 2021-07-30 DIAGNOSIS — E11.43 GASTROPARESIS DIABETICORUM (HCC): ICD-10-CM

## 2021-07-30 PROBLEM — Z86.0100 HISTORY OF COLON POLYPS: Status: ACTIVE | Noted: 2021-07-30

## 2021-07-30 PROCEDURE — 99214 OFFICE O/P EST MOD 30 MIN: CPT | Performed by: PHYSICIAN ASSISTANT

## 2021-07-30 RX ORDER — MAGNESIUM GLUCONATE 27 MG(500)
500 TABLET ORAL
COMMUNITY
End: 2021-09-10

## 2021-07-30 RX ORDER — POLYETHYLENE GLYCOL 3350 17 G/17G
17 POWDER, FOR SOLUTION ORAL DAILY
COMMUNITY
End: 2021-10-11 | Stop reason: ALTCHOICE

## 2021-07-30 RX ORDER — OMEPRAZOLE 20 MG/1
TABLET, DELAYED RELEASE ORAL
COMMUNITY
End: 2021-08-02

## 2021-07-30 NOTE — PROGRESS NOTES
Follow-up Note -  [facility]  Suzanne Harris 1950 66 y o  female         Reason:  Follow-up; gastroparesis, chronic constipation, history of colon polyps    HPI:  77-year-old female with history of cholecystectomy, chronic kidney disease, diabetes and sleep apnea who presents for follow-up; she was seen in our office with Dr Mena Granado in March for evaluation of symptoms of fullness, bloating and gas; she had already been noted with history of severe gastroparesis with only 3% emptying at 4 hours, had also had history of recurrence of hiatal hernia despite at least 2 surgical repairs most recently 5 years ago  She was scheduled for EGD with Botox injection which was carried out on 05/13/2021 - this showed a large diverticulum at the GE junction, a small sliding hiatal hernia measuring about 2 cm, and a large food bolus in the gastric lumen with patent pylorus  The patient said that she did have symptoms of epigastric pain and vomiting resulting in brief ER visit shortly after her Botox injection, but since that time she says her abdominal bloating and discomfort is actually much better since then  She says she does have chronic constipation, and has had difficulty titrating MiraLax to a desired stool frequency; if she takes MiraLax daily she has frequent loose stools which are difficult to control, but she decreases the MiraLax she begins to go several days without bowel movement with associated abdominal discomfort  She denies any vomiting episodes, denies any unexpected weight changes  She also notes that she has not had colonoscopy for about 6 years, which was done in [place]; she does report history of polyps on previous colonoscopies  She denies any rectal bleeding or melena  She says she is also going to be seeing a dietitian soon to discuss about dietary strategies given her diabetes and associated gastroparesis      REVIEW OF SYSTEMS:      CONSTITUTIONAL: Denies any fever, chills, or rigors  Good appetite, and no recent weight loss  HEENT: No earache or tinnitus  Denies hearing loss or visual disturbances  CARDIOVASCULAR: No chest pain or palpitations  RESPIRATORY: Denies any cough, hemoptysis, shortness of breath or dyspnea on exertion  GASTROINTESTINAL: As noted in the History of Present Illness  GENITOURINARY: No problems with urination  Denies any hematuria or dysuria  NEUROLOGIC: No dizziness or vertigo, denies headaches  MUSCULOSKELETAL: Denies any muscle or joint pain  SKIN: Denies skin rashes or itching  ENDOCRINE: Denies excessive thirst  Denies intolerance to heat or cold  PSYCHOSOCIAL: Denies depression or anxiety  Denies any recent memory loss  Past Medical History:   Diagnosis Date    Allergic     Anxiety     Arthritis     Aspiration into airway     Basal cell carcinoma 2007    left cheek     BCC (basal cell carcinoma) 05/27/2021    Left Nasal tip    Cancer (HCC)     squamous cell cancer on forhead    Cancer (HCC)     basal cell on nose     Chronic kidney disease 2000, 2018    Stones, kidney disease stage 4    CPAP (continuous positive airway pressure) dependence     Diabetes mellitus (HCC)     Disease of thyroid gland     Family history of thyroid problem     GERD (gastroesophageal reflux disease)     Heart burn     Hyperlipidemia     Hyperplasia, parathyroid ([address] )     Hypertension     Kidney problem     Kidney stone     Obesity (BMI 30 0-34  9)     Pneumonia     RLS (restless legs syndrome)     SCC (squamous cell carcinoma) 05/04/2021    left mid forehead    Seasonal allergies     Sleep apnea     uses CPAP    Squamous cell skin cancer 2007    left cheek     Swollen ankles       Past Surgical History:   Procedure Laterality Date    ARTHROSCOPY KNEE      BREAST BIOPSY      stereotactic-benign    BREAST BIOPSY      stereo-benign    BREAST EXCISIONAL BIOPSY      unknown date-benign    BREAST EXCISIONAL BIOPSY      unknown date-benign    BREAST EXCISIONAL BIOPSY      unknown date-benign    BREAST EXCISIONAL BIOPSY      unknown age-benign    CHOLECYSTECTOMY      COLONOSCOPY      EXAMINATION UNDER ANESTHESIA N/A 2021    Procedure: EXAM UNDER ANESTHESIA (EUA), DISE;  Surgeon: Asmita Mclean MD;  [facility];  Service: ENT    HERNIA REPAIR      HIATAL HERNIA REPAIR      LIPOSUCTION      MOHS SURGERY  2021    left mid forehead-Gautam    MOHS SURGERY Left 2021    Left nasal tip- gautam     REDUCTION MAMMAPLASTY Bilateral 2000    SKIN BIOPSY      SKIN CANCER EXCISION  2007    squamous cell carcinoma     SKIN CANCER EXCISION  2007    basal cell carcinoma    SQUAMOUS CELL CARCINOMA EXCISION      TOE SURGERY      TONSILLECTOMY      TUBAL LIGATION      UPPER GASTROINTESTINAL ENDOSCOPY       Social History     Socioeconomic History    Marital status: /Civil Union     Spouse name: Not on file    Number of children: Not on file    Years of education: Not on file    Highest education level: Not on file   Occupational History    Occupation: RETIRED   Tobacco Use    Smoking status: Former Smoker     Packs/day: 2 00     Years: 10 00     Pack years: 20 00     Types: Cigarettes     Start date: 1967     Quit date:      Years since quittin 6    Smokeless tobacco: Never Used   Vaping Use    Vaping Use: Never used   Substance and Sexual Activity    Alcohol use: Not Currently     Alcohol/week: 0 0 standard drinks    Drug use: No    Sexual activity: Not Currently     Partners: Male     Birth control/protection: Abstinence, Post-menopausal, Female Sterilization   Other Topics Concern    Not on file   Social History Narrative    Not on file     Social Determinants of Health     Financial Resource Strain:     Difficulty of Paying Living Expenses:    Food Insecurity:     Worried About Running Out of Food in the Last Year:     Ran Out of Food in the Last Year:    [employer] Needs:     Lack of Transportation (Medical):      Lack of Transportation (Non-Medical):    Physical Activity:     Days of Exercise per Week:     Minutes of Exercise per Session:    Stress:     Feeling of Stress :    Social Connections:     Frequency of Communication with Friends and Family:     Frequency of Social Gatherings with Friends and Family:     Attends [employer] Services:     Active Member of Clubs or Organizations:     Attends Club or Organization Meetings:     Marital Status:    Intimate Partner Violence:     Fear of Current or Ex-Partner:     Emotionally Abused:     Physically Abused:     Sexually Abused:      Family History   Problem Relation Age of Onset    Heart disease Mother     Depression Mother     Hypertension Mother     COPD Mother     Hearing loss Mother     Heart disease Father     Lung cancer Father 79        Smoker     Cancer Father         brain    Alcohol abuse Father     Dementia Father     Hypertension Father     Thyroid disease Father     COPD Father     Arthritis Father     Brain cancer Father 76    Hypertension Sister     Diabetes Sister     Heart disease Sister     Thyroid disease Sister     Cancer Sister         Lympoma    Lung cancer Sister 78    Hypertension Brother     Diabetes Brother     Cancer Brother         Throat    Dementia Brother     Stroke Brother     Hypertension Brother     No Known Problems Son     No Known Problems Son     Heart disease Brother     Diabetes Brother     Brain cancer Paternal Aunt         unknown age     Pollen extract and Ciprofloxacin  Current Outpatient Medications   Medication Sig Dispense Refill    acetaminophen (TYLENOL) 500 mg tablet Take 1,000 mg by mouth as needed       albuterol (Ventolin HFA) 90 mcg/act inhaler Inhale 2 puffs every 6 (six) hours as needed for wheezing or shortness of breath  0    amLODIPine (NORVASC) 5 mg tablet TAKE 1 TABLET BY MOUTH EVERY DAY 90 tablet 3    b complex vitamins tablet Take 1 tablet by mouth daily       DULoxetine (Cymbalta) 30 mg delayed release capsule Take 30 mg by mouth daily      DULoxetine (CYMBALTA) 60 mg delayed release capsule Take 90 mg by mouth daily      famotidine (PEPCID) 40 MG tablet Take 1 tablet (40 mg total) by mouth daily Take daily in am 90 tablet 3    gabapentin (NEURONTIN) 300 mg capsule Take 300 mg by mouth daily       Icosapent Ethyl (Vascepa) 1 g CAPS Take 1 g by mouth 2 (two) times a day      insulin aspart (NovoLOG) 100 units/mL injection Inject under the skin 3 (three) times a day before meals 14 units, 14 units, 18 units        insulin detemir (LEVEMIR) 100 units/mL subcutaneous injection Inject 32 Units under the skin daily at bedtime (Patient taking differently: Inject 40 Units under the skin daily at bedtime )  0    levothyroxine 137 mcg tablet Take 137 mcg by mouth      metoprolol tartrate (LOPRESSOR) 50 mg tablet Take 1 tablet (50 mg total) by mouth every 12 (twelve) hours 180 tablet 3    Multiple Vitamin (multivitamin) capsule Take 1 capsule by mouth daily      olmesartan (BENICAR) 40 mg tablet TAKE 1 TABLET BY MOUTH EVERY DAY 90 tablet 0    pantoprazole (PROTONIX) 20 mg tablet Take 1 tablet (20 mg total) by mouth daily (Patient taking differently: Take 40 mg by mouth daily )      polyethylene glycol (MIRALAX) 17 g packet Take 17 g by mouth daily      pramipexole (MIRAPEX) 0 25 mg tablet Take 1 tablet (0 25 mg total) by mouth daily 90 tablet 3    QUEtiapine (SEROquel) 25 mg tablet Take 1 tablet (25 mg total) by mouth daily at bedtime 30 tablet 2    rosuvastatin (CRESTOR) 5 mg tablet TAKE 1 TABLET BY MOUTH EVERY DAY AT NIGHT      torsemide (DEMADEX) 20 mg tablet TAKE 1 TABLET BY MOUTH EVERY DAY 90 tablet 3    Vitamin D, Ergocalciferol, 2000 units CAPS Take 2,000 Units by mouth daily       B-D ULTRAFINE III SHORT PEN 31G X 8 MM MISC USE AS DIRECTED 4 TIMES PER DAY (Patient not taking: Reported on 7/30/2021)  3    bisacodyl (DULCOLAX) 5 mg EC tablet Take 2 tablets (10 mg total) by mouth once for 1 dose 2 tablet 0    doxazosin (CARDURA) 2 mg tablet Take 2 tablets (4 mg total) by mouth daily at bedtime (Patient not taking: Reported on 7/16/2021) 180 tablet 3    fluticasone (FLONASE) 50 mcg/act nasal spray 1-2 sprays daily (Patient not taking: Reported on 7/30/2021)      insulin aspart (NovoLOG FlexPen) 100 UNIT/ML injection pen Inject 18-24 Units under the skin (Patient not taking: Reported on 7/30/2021)      insulin aspart, w/niacinamide, (FIASP) 100 Units/mL injection pen Inject 14 units at breakfast and lunch then 18 units at dinner  Take 4 extra units when on steroid  Take 20 minutes before meals (Patient not taking: Reported on 7/30/2021)      insulin detemir (Levemir FlexTouch) 100 Units/mL injection pen Inject 45 Units under the skin (Patient not taking: Reported on 7/30/2021)      magnesium gluconate (MAGONATE) 500 MG tablet 500 mg      omeprazole (PriLOSEC OTC) 20 MG tablet omeprazole 20 mg tablet,delayed release   1 CAP(S), 20 MG, BID (Patient not taking: Reported on 7/30/2021)      polyethylene glycol (GOLYTELY) 4000 mL solution Take 4,000 mL by mouth once for 1 dose 4000 mL 0    Prucalopride Succinate 1 MG TABS Take 1 mg by mouth daily 30 tablet 5     No current facility-administered medications for this visit  Blood pressure 142/80, height 5' 3" (1 6 m), weight 77 9 kg (171 lb 12 8 oz)  PHYSICAL EXAM:      General Appearance:   Alert, cooperative, no distress, appears stated age    HEENT:   Normocephalic, atraumatic, anicteric      Neck:  Supple, symmetrical, trachea midline, no adenopathy;    thyroid: no enlargement/tenderness/nodules; no carotid  bruit or JVD    Lungs:   Clear to auscultation bilaterally; no rales, rhonchi or wheezing; respirations unlabored    Heart[de-identified]   S1 and S2 normal; regular rate and rhythm; no murmur, rub, or gallop     Abdomen:   Soft, non-tender, non-distended; normal bowel sounds; no masses, no organomegaly    Extremities: No edema, erythema, wounds, rashes   Rectal:   Deferred                      Lab Results   Component Value Date    WBC 8 06 05/22/2021    HGB 11 9 05/22/2021    HCT 36 5 05/22/2021    MCV 85 05/22/2021     05/22/2021     Lab Results   Component Value Date    GLUCOSE 168 (H) 11/30/2018    CALCIUM 9 1 05/22/2021    K 4 9 05/22/2021    CO2 31 05/22/2021     05/22/2021    BUN 36 (H) 05/22/2021    CREATININE 1 79 (H) 05/22/2021     Lab Results   Component Value Date    ALT 35 05/22/2021    AST 40 05/22/2021    ALKPHOS 94 05/22/2021     Lab Results   Component Value Date    INR 0 94 05/17/2021    INR 0 96 06/19/2020    INR 1 02 05/03/2020    PROTIME 12 6 05/17/2021    PROTIME 12 2 06/19/2020    PROTIME 12 8 05/03/2020       No results found  ASSESSMENT & PLAN:    Chronic constipation  Although this appears longstanding and unchanged, the patient does report significant difficulty controlling her bowel habits with MiraLax despite careful titration  She probably has a component of underlying diabetic autonomic neuropathy, given her known diabetic gastroparesis    -recommend starting 1 mg daily Motegrity (renally dosed), for chronic constipation, this has also shown benefit in off-label use for gastroparesis    - she may still use MiraLax as needed for constipation     -given history of colon polyps, mentioned in HPI, will recommend colonoscopy; will utilize GoLYTELY prep extended over 2 days given her chronic kidney disease and gastroparesis    -patient was advised regarding risks and benefits of the procedure, risks including but not limited to infection, perforation and bleeding    Gastroparesis diabeticorum ([address] )  Patient actually appears to be doing well status post pyloric Botox injection in May    She is asking if she needs to continue PPI therapy in the long-term, she is taking pantoprazole 40 mg daily and Pepcid 40 mg daily     -she did not have any evidence of Ahumada's esophagus or peptic ulcer disease on recent EGD; would be reasonable to trial tapering off PPI; advised her to try taking pantoprazole every other day for 3-4 weeks, and taper off entirely if she tolerates the tapering process well  If she has recurrence of GERD or other dyspeptic symptomatology during or shortly after the tapering process, she should resume PPI therapy     -continue on Pepcid     -continue small frequent meals, follow-up with dietitian as planned    -explained to patient that effects of pyloric Botox injection should be expected to be temporary, she should let us know if she begins to experience recurrent bloating, or other new GI symptoms    - continue tight management of blood glucose  Lab Results   Component Value Date    HGBA1C 6 8 (H) 05/24/2021       History of colon polyps  See 'chronic constipation'          Answers for HPI/ROS submitted by the patient on 7/27/2021  Chronicity: chronic  Onset: more than 1 year ago  Onset quality: gradual  Frequency: every several days  Episode duration: 1 hours  Progression since onset: waxing and waning  Pain location: LLQ  Pain - numeric: 7/10  Pain quality: cramping  Radiates to: left flank  anorexia: No  arthralgias: Yes  belching: Yes  constipation: No  diarrhea: Yes  dysuria: No  fever: No  flatus: Yes  frequency: Yes  headaches: No  hematochezia: No  hematuria: No  melena: No  myalgias: No  nausea:  No  weight loss: No  vomiting: No  Aggravated by: bowel movement, eating  Relieved by: passing flatus, sitting up

## 2021-07-30 NOTE — ASSESSMENT & PLAN NOTE
Patient actually appears to be doing well status post pyloric Botox injection in May  She is asking if she needs to continue PPI therapy in the long-term, she is taking pantoprazole 40 mg daily and Pepcid 40 mg daily     -she did not have any evidence of Ahumada's esophagus or peptic ulcer disease on recent EGD; would be reasonable to trial tapering off PPI; advised her to try taking pantoprazole every other day for 3-4 weeks, and taper off entirely if she tolerates the tapering process well    If she has recurrence of GERD or other dyspeptic symptomatology during or shortly after the tapering process, she should resume PPI therapy     -continue on Pepcid     -continue small frequent meals, follow-up with dietitian as planned    -explained to patient that effects of pyloric Botox injection should be expected to be temporary, she should let us know if she begins to experience recurrent bloating, or other new GI symptoms    - continue tight management of blood glucose  Lab Results   Component Value Date    HGBA1C 6 8 (H) 05/24/2021

## 2021-07-30 NOTE — ASSESSMENT & PLAN NOTE
Although this appears longstanding and unchanged, the patient does report significant difficulty controlling her bowel habits with MiraLax despite careful titration    She probably has a component of underlying diabetic autonomic neuropathy, given her known diabetic gastroparesis    -recommend starting 1 mg daily Motegrity (renally dosed), for chronic constipation, this has also shown benefit in off-label use for gastroparesis    - she may still use MiraLax as needed for constipation     -given history of colon polyps, mentioned in HPI, will recommend colonoscopy; will utilize GoLYTELY prep extended over 2 days given her chronic kidney disease and gastroparesis    -patient was advised regarding risks and benefits of the procedure, risks including but not limited to infection, perforation and bleeding

## 2021-08-02 DIAGNOSIS — K59.09 CHRONIC CONSTIPATION: ICD-10-CM

## 2021-08-02 DIAGNOSIS — E11.43 GASTROPARESIS DIABETICORUM (HCC): ICD-10-CM

## 2021-08-02 DIAGNOSIS — K31.84 GASTROPARESIS DIABETICORUM (HCC): ICD-10-CM

## 2021-08-04 DIAGNOSIS — F33.9 DEPRESSION, RECURRENT (HCC): ICD-10-CM

## 2021-08-04 DIAGNOSIS — K21.9 GERD (GASTROESOPHAGEAL REFLUX DISEASE): ICD-10-CM

## 2021-08-04 RX ORDER — QUETIAPINE FUMARATE 25 MG/1
25 TABLET, FILM COATED ORAL
Qty: 30 TABLET | Refills: 5 | Status: SHIPPED | OUTPATIENT
Start: 2021-08-04 | End: 2022-04-05 | Stop reason: ALTCHOICE

## 2021-08-04 RX ORDER — PRUCALOPRIDE 1 MG/1
TABLET, FILM COATED ORAL
Qty: 30 TABLET | Refills: 5 | OUTPATIENT
Start: 2021-08-04

## 2021-08-04 RX ORDER — PANTOPRAZOLE SODIUM 20 MG/1
40 TABLET, DELAYED RELEASE ORAL DAILY
Qty: 60 TABLET | Refills: 5
Start: 2021-08-04 | End: 2021-10-11 | Stop reason: ALTCHOICE

## 2021-08-04 NOTE — TELEPHONE ENCOUNTER
GI Physician: Dr Aissatou Cabrera for Medication: Pantoprazole    Dose: 40mg    Quantity: 60tab    Pharmacy and Location: [facility]

## 2021-08-04 NOTE — TELEPHONE ENCOUNTER
Medication refill requested: Seroquel   Last office visit: 06/28/21  Next office visit: 10/12/21  Last refilled:05/26/2021  Pharmacy: [facility]  Pended: [id number]

## 2021-08-04 NOTE — TELEPHONE ENCOUNTER
Pt of Dr Angel Tellez last seen 7/30/2021 by Bruce ortiz PA-C   HX: gastroparesis, chronic constipation    Requesting refill of pantoprazole 40mg

## 2021-08-16 ENCOUNTER — TELEMEDICINE (OUTPATIENT)
Dept: FAMILY MEDICINE CLINIC | Facility: CLINIC | Age: 71
End: 2021-08-16
Payer: MEDICARE

## 2021-08-16 ENCOUNTER — TELEPHONE (OUTPATIENT)
Dept: FAMILY MEDICINE CLINIC | Facility: CLINIC | Age: 71
End: 2021-08-16

## 2021-08-16 VITALS
HEIGHT: 63 IN | HEART RATE: 70 BPM | DIASTOLIC BLOOD PRESSURE: 82 MMHG | OXYGEN SATURATION: 94 % | TEMPERATURE: 98.6 F | BODY MASS INDEX: 31.36 KG/M2 | WEIGHT: 177 LBS | SYSTOLIC BLOOD PRESSURE: 133 MMHG

## 2021-08-16 DIAGNOSIS — U07.1 COVID-19 VIRUS INFECTION: Primary | ICD-10-CM

## 2021-08-16 PROCEDURE — 99214 OFFICE O/P EST MOD 30 MIN: CPT | Performed by: FAMILY MEDICINE

## 2021-08-16 RX ORDER — SODIUM CHLORIDE 9 MG/ML
20 INJECTION, SOLUTION INTRAVENOUS ONCE
Status: CANCELLED | OUTPATIENT
Start: 2021-08-18

## 2021-08-16 RX ORDER — ONDANSETRON 2 MG/ML
4 INJECTION INTRAMUSCULAR; INTRAVENOUS ONCE AS NEEDED
Status: CANCELLED | OUTPATIENT
Start: 2021-08-18

## 2021-08-16 RX ORDER — ACETAMINOPHEN 325 MG/1
650 TABLET ORAL ONCE AS NEEDED
Status: CANCELLED | OUTPATIENT
Start: 2021-08-18

## 2021-08-16 RX ORDER — ALBUTEROL SULFATE 90 UG/1
3 AEROSOL, METERED RESPIRATORY (INHALATION) ONCE AS NEEDED
Status: CANCELLED | OUTPATIENT
Start: 2021-08-18

## 2021-08-16 NOTE — PROGRESS NOTES
COVID-19 Outpatient Progress Note    Assessment/Plan:    Problem List Items Addressed This Visit        Other    COVID-19 virus infection - Primary         Disposition:     I recommended self-quarantine for 10 days and to watch for symptoms until 14 days after exposure  If patient were to develop symptoms, they should self isolate and call our office for further guidance  Reviewed monoclonal antibody treatment  Pt would like to proceed  Patient is at increased risk of progressing towards severe COVID-19 due to the following high risk criteria:   - Older age (age >= 72years old)  - Obesity or being overweight  - Chronic kidney disease  - Diabetes  - Cardiovascular disease  - Chronic lung disease  - Medical-related technological dependence    Patient meets criteria for Casirivimab/Imdevimab administration for the treatment of COVID-19  They were counseled in regards to risks, benefits, and side effects of this infusion  Casirivimab and imdevimab are investigational medicines used to treat mild to moderate symptoms of COVID-19 in non-hospitalized adults and adolescents (15years of age and older who weigh at least 80 pounds (40 kg)), and who are at high risk for developing severe COVID-19 symptoms or the need for hospitalization  Casirivimab and imdevimab are investigational because they are still being studied  There is limited information known about the safety and effectiveness of using casirivimab and imdevimab to treat people with COVID-19  The FDA has authorized the emergency use of casirivimab and imdevimab for the treatment of COVID-19 under an Emergency Use Authorization (EUA)  Possible side effects of casirivimab and imdevimab: Allergic reactions can happen during and after infusion with casirivimab and imdevimab      Possible reactions include: fever, chills, nausea, headache, shortness of breath, low blood pressure, wheezing, swelling of your lips, face, or throat, rash including hives, itching, muscle aches, and dizziness  The side effects of getting any medicine by vein may include brief pain, bleeding, bruising of the skin, soreness, swelling, and possible infection at the infusion site  These are not all the possible side effects of casirivimab and imdevimab  Not a lot of people have been given casirivimab and imdevimab  Serious and unexpected side effects may happen  Casirivimab and imdevimab are still being studied so it is possible that all of the risks are not known at this time  It is possible that casirivimab and imdevimab could interfere with your body's own ability to fight off a future infection of SARS-CoV-2  Similarly, casirivimab and imdevimab may reduce your body's immune response to a vaccine for SARS-CoV-2  Specific studies have not been conducted to address these possible risks  Emergency Use Authorization:    The [facility] FDA has made casirivimab and imdevimab available under an emergency access mechanism called an EUA  The EUA is supported by a  of Health and Human Service (HHS) declaration that circumstances exist to justify the emergency use of drugs and biological products during the COVID-19 pandemic  Casirivimab and imdevimab have not undergone the same type of review as an FDA-approved or cleared product  The FDA may issue an EUA when certain criteria are met, which includes that there are no adequate, approved, available alternatives  In addition, the FDA decision is based on the totality of scientific evidence available showing that it is reasonable to believe that the product meets certain criteria for safety, performance, and labeling and may be effective in treatment of patients during the COVID-19 pandemic  All of these criteria must be met to allow for the product to be used in the treatment of patients during the COVID-19 pandemic       The EUA for casirivimab and imdevimab is in effect for the duration of the COVID-19 declaration justifying emergency use of these products, unless terminated or revoked (after which the products may no longer be used)  Regarding COVID-19 Vaccination:    Currently there is no data or safety or efficacy of COVID-19 vaccination in persons who received monoclonal antibodies as part of COVID-19 treatment  Treatment should be deferred for at least 90 days to avoid interference of the treatment with vaccine-induced immune responses (this is based on estimated half-life of therapies and evidence suggesting reinfection is uncommon within 90 days of initial infection)  The patient consents to proceed with casirivimab and imdevimab administration  I have spent 20 minutes directly with the patient  Greater than 50% of this time was spent in counseling/coordination of care regarding: risks and benefits of treatment options, instructions for management and patient and family education  Verification of patient location:    Patient is located in the following state in which I hold an active license [place]    Encounter provider Melody Huertas MD    Provider located at [address] [place] [id number] [phone]    Recent Visits  No visits were found meeting these conditions  Showing recent visits within past 7 days and meeting all other requirements  Today's Visits  Date Type Provider Dept   08/16/21 Telemedicine Melody Huertas MD [facility]   08/16/21 Telephone Melody Huertas MD [facility] today's visits and meeting all other requirements  Future Appointments  No visits were found meeting these conditions  Showing future appointments within next 150 days and meeting all other requirements     This virtual check-in was done via StyleChat by [employer] and patient was informed that this is a secure, HIPAA-compliant platform  She agrees to proceed      Patient agrees to participate in a virtual check in via telephone or video visit instead of presenting to the office to address urgent/immediate medical needs  Patient is aware this is a billable service  After connecting through [facility], the patient was identified by name and date of birth  Kate Breaux was informed that this was a telemedicine visit and that the exam was being conducted confidentially over secure lines  My office door was closed  No one else was in the room  Kate Breaux acknowledged consent and understanding of privacy and security of the telemedicine visit  I informed the patient that I have reviewed her record in Epic and presented the opportunity for her to ask any questions regarding the visit today  The patient agreed to participate  Subjective:   Kate Breaux is a 66 y o  female who is concerned about COVID-19  Patient's symptoms include fever (feels feverish), fatigue, nasal congestion, rhinorrhea, sore throat, cough, shortness of breath (gets a little SOB when she is up doing things, resolves when she sits ), abdominal pain (feels some pain in her L side of abdomen, feels like constipation to her) and diarrhea (on Friday only)  Patient denies nausea and vomiting  Date of symptom onset: 8/13/2021  COVID-19 vaccination status: Fully vaccinated    Exposure:   Contact with a person who is under investigation (PUI) for or who is positive for COVID-19 within the last 14 days?: Yes    Currently a healthcare worker that is involved in direct patient care?: No      Pt here for video visit   started with symptoms on Thursday, patient started on Friday  Pulse ox at home- on a normal day 94-96%, today 94%    Please note- patient was unable to establish a video connection despite several attempts and the call needed to be completed by telephone      Lab Results   Component Value Date    SARSCOV2 Negative 02/04/2021    SARSCOV2 Not Detected 08/01/2020    SARSCORONAVI Detected (A) 08/15/2021     Past Medical History:   Diagnosis Date    Allergic     Anxiety  Arthritis     Aspiration into airway     Basal cell carcinoma 2007    left cheek     BCC (basal cell carcinoma) 05/27/2021    Left Nasal tip    Cancer (HCC)     squamous cell cancer on forhead    Cancer (HCC)     basal cell on nose     Chronic kidney disease 2000, 2018    Stones, kidney disease stage 4    CPAP (continuous positive airway pressure) dependence     Diabetes mellitus (HCC)     Disease of thyroid gland     Family history of thyroid problem     GERD (gastroesophageal reflux disease)     Heart burn     Hyperlipidemia     Hyperplasia, parathyroid ([address] )     Hypertension     Kidney problem     Kidney stone     Obesity (BMI 30 0-34  9)     Pneumonia     RLS (restless legs syndrome)     SCC (squamous cell carcinoma) 05/04/2021    left mid forehead    Seasonal allergies     Sleep apnea     uses CPAP    Squamous cell skin cancer 2007    left cheek     Swollen ankles      Past Surgical History:   Procedure Laterality Date    ARTHROSCOPY KNEE      BREAST BIOPSY      stereotactic-benign    BREAST BIOPSY      stereo-benign    BREAST EXCISIONAL BIOPSY      unknown date-benign    BREAST EXCISIONAL BIOPSY      unknown date-benign    BREAST EXCISIONAL BIOPSY      unknown date-benign    BREAST EXCISIONAL BIOPSY      unknown age-benign    CHOLECYSTECTOMY      COLONOSCOPY      EXAMINATION UNDER ANESTHESIA N/A 6/24/2021    Procedure: EXAM UNDER ANESTHESIA (EUA), DISE;  Surgeon: Severo Mcguire MD;  [facility];  Service: ENT    HERNIA REPAIR      HIATAL HERNIA REPAIR      LIPOSUCTION      MOHS SURGERY  05/20/2021    left mid forehead-Gautam    MOHS SURGERY Left 05/27/2021    Left nasal tip- gautam     REDUCTION MAMMAPLASTY Bilateral 2000    SKIN BIOPSY      SKIN CANCER EXCISION  2007    squamous cell carcinoma     SKIN CANCER EXCISION  2007    basal cell carcinoma    SQUAMOUS CELL CARCINOMA EXCISION      TOE SURGERY      TONSILLECTOMY      TUBAL LIGATION      UPPER GASTROINTESTINAL ENDOSCOPY       Current Outpatient Medications   Medication Sig Dispense Refill    acetaminophen (TYLENOL) 500 mg tablet Take 1,000 mg by mouth as needed       albuterol (Ventolin HFA) 90 mcg/act inhaler Inhale 2 puffs every 6 (six) hours as needed for wheezing or shortness of breath  0    amLODIPine (NORVASC) 5 mg tablet TAKE 1 TABLET BY MOUTH EVERY DAY 90 tablet 3    b complex vitamins tablet Take 1 tablet by mouth daily       B-D ULTRAFINE III SHORT PEN 31G X 8 MM MISC USE AS DIRECTED 4 TIMES PER DAY  3    bisacodyl (DULCOLAX) 5 mg EC tablet Take 2 tablets (10 mg total) by mouth once for 1 dose 2 tablet 0    DULoxetine (Cymbalta) 30 mg delayed release capsule Take 30 mg by mouth daily      DULoxetine (CYMBALTA) 60 mg delayed release capsule Take 90 mg by mouth daily      famotidine (PEPCID) 40 MG tablet Take 1 tablet (40 mg total) by mouth daily Take daily in am 90 tablet 3    fluticasone (FLONASE) 50 mcg/act nasal spray 1-2 sprays daily       gabapentin (NEURONTIN) 300 mg capsule Take 300 mg by mouth daily       Icosapent Ethyl (Vascepa) 1 g CAPS Take 1 g by mouth 2 (two) times a day      insulin aspart (NovoLOG FlexPen) 100 UNIT/ML injection pen Inject 18-24 Units under the skin       insulin aspart (NovoLOG) 100 units/mL injection Inject under the skin 3 (three) times a day before meals 14 units, 14 units, 18 units   insulin aspart, w/niacinamide, (FIASP) 100 Units/mL injection pen Inject 14 units at breakfast and lunch then 18 units at dinner  Take 4 extra units when on steroid   Take 20 minutes before meals      insulin detemir (Levemir FlexTouch) 100 Units/mL injection pen Inject 45 Units under the skin       insulin detemir (LEVEMIR) 100 units/mL subcutaneous injection Inject 32 Units under the skin daily at bedtime (Patient taking differently: Inject 50 Units under the skin daily at bedtime )  0    levothyroxine 137 mcg tablet Take 137 mcg by mouth      metoprolol tartrate (LOPRESSOR) 50 mg tablet Take 1 tablet (50 mg total) by mouth every 12 (twelve) hours 180 tablet 3    Multiple Vitamin (multivitamin) capsule Take 1 capsule by mouth daily      olmesartan (BENICAR) 40 mg tablet TAKE 1 TABLET BY MOUTH EVERY DAY 90 tablet 0    pantoprazole (PROTONIX) 20 mg tablet Take 2 tablets (40 mg total) by mouth daily 60 tablet 5    polyethylene glycol (MIRALAX) 17 g packet Take 17 g by mouth daily      pramipexole (MIRAPEX) 0 25 mg tablet Take 1 tablet (0 25 mg total) by mouth daily 90 tablet 3    Prucalopride Succinate 1 MG TABS Take 1 mg by mouth daily 30 tablet 5    QUEtiapine (SEROquel) 25 mg tablet Take 1 tablet (25 mg total) by mouth daily at bedtime 30 tablet 5    rosuvastatin (CRESTOR) 5 mg tablet TAKE 1 TABLET BY MOUTH EVERY DAY AT NIGHT      torsemide (DEMADEX) 20 mg tablet TAKE 1 TABLET BY MOUTH EVERY DAY 90 tablet 3    Vitamin D, Ergocalciferol, 2000 units CAPS Take 2,000 Units by mouth daily       doxazosin (CARDURA) 2 mg tablet Take 2 tablets (4 mg total) by mouth daily at bedtime (Patient not taking: Reported on 8/16/2021) 180 tablet 3    magnesium gluconate (MAGONATE) 500 MG tablet 500 mg      polyethylene glycol (GOLYTELY) 4000 mL solution Take 4,000 mL by mouth once for 1 dose (Patient not taking: Reported on 8/16/2021) 4000 mL 0     No current facility-administered medications for this visit  Allergies   Allergen Reactions    Ciprofloxacin Hives and Itching       Review of Systems   Constitutional: Positive for fatigue and fever (feels feverish)  HENT: Positive for congestion, rhinorrhea and sore throat  Respiratory: Positive for cough and shortness of breath (gets a little SOB when she is up doing things, resolves when she sits  )  Gastrointestinal: Positive for abdominal pain (feels some pain in her L side of abdomen, feels like constipation to her) and diarrhea (on Friday only)  Negative for nausea and vomiting  Objective:    Vitals:    08/16/21 1034   BP: 133/82   Pulse: 70   Temp: 98 6 °F (37 °C)   SpO2: 94%   Weight: 80 3 kg (177 lb)   Height: 5' 3" (1 6 m)       Physical Exam    VIRTUAL VISIT DISCLAIMER    Vivek Fuentes verbally agrees to participate in [employer]  Pt is aware that [employer] could be limited without vital signs or the ability to perform a full hands-on physical exam  Gabriella Fuentes understands she or the provider may request at any time to terminate the video visit and request the patient to seek care or treatment in person

## 2021-08-16 NOTE — TELEPHONE ENCOUNTER
Patient, son, and  recently returned from vacation in [place], [place]  On the way home,  began to feel ill and subsequently got tested (positive)  Pt began with fatigue and cough and also tested positive  She c/o cough with occasional white sputum (upper respiratory she says) congestion, fatigue and felt feverish last night but didn't check temperature   She isn't concerned about symptoms at present, wanted office to be aware of positive test  Pt given CDC quarantine and masking guidelines and instructed that her son should be tested as well

## 2021-08-16 NOTE — TELEPHONE ENCOUNTER
Patient called with the following Covid-19 concern:    Patient does not feel an appt is neccessary    Patient was exposed to Covid-19? yes  Date of Exposure?   08/16/2021   Date of last exposure (family member in quarantine) 08/16/2021    Patient has the following symptoms: Congestion upper respiratory    Date symptoms started: 08/14/2021    Is the patient a hospital employee?  no  Has patient been vaccinated? yes       If so, when was their last COVID vaccine? 02/28/21    Will route the following message as HIGH priority to a provider currently in the office for review

## 2021-08-16 NOTE — TELEPHONE ENCOUNTER
Given her age and risk factors, she could be considered for monoclonal antibody treatment  If so- please have her schedule a virtual visit today or tomorrow  Otherwise would suggest virtual visit towards the end of the week to check in

## 2021-08-18 ENCOUNTER — HOSPITAL ENCOUNTER (OUTPATIENT)
Dept: INFUSION CENTER | Facility: HOSPITAL | Age: 71
Discharge: HOME | End: 2021-08-18
Payer: MEDICARE

## 2021-08-18 VITALS
SYSTOLIC BLOOD PRESSURE: 121 MMHG | RESPIRATION RATE: 18 BRPM | TEMPERATURE: 98.3 F | OXYGEN SATURATION: 95 % | DIASTOLIC BLOOD PRESSURE: 61 MMHG | HEART RATE: 70 BPM

## 2021-08-18 DIAGNOSIS — U07.1 COVID-19 VIRUS INFECTION: Primary | ICD-10-CM

## 2021-08-18 PROCEDURE — M0243 CASIRIVI AND IMDEVI INFUSION: HCPCS | Performed by: FAMILY MEDICINE

## 2021-08-18 RX ORDER — ACETAMINOPHEN 325 MG/1
650 TABLET ORAL ONCE AS NEEDED
Status: DISCONTINUED | OUTPATIENT
Start: 2021-08-18 | End: 2021-08-21 | Stop reason: HOSPADM

## 2021-08-18 RX ORDER — SODIUM CHLORIDE 9 MG/ML
20 INJECTION, SOLUTION INTRAVENOUS ONCE
Status: CANCELLED | OUTPATIENT
Start: 2021-08-18

## 2021-08-18 RX ORDER — ACETAMINOPHEN 325 MG/1
650 TABLET ORAL ONCE AS NEEDED
Status: CANCELLED | OUTPATIENT
Start: 2021-08-18

## 2021-08-18 RX ORDER — SODIUM CHLORIDE 9 MG/ML
20 INJECTION, SOLUTION INTRAVENOUS ONCE
Status: COMPLETED | OUTPATIENT
Start: 2021-08-18 | End: 2021-08-18

## 2021-08-18 RX ORDER — ALBUTEROL SULFATE 90 UG/1
3 AEROSOL, METERED RESPIRATORY (INHALATION) ONCE AS NEEDED
Status: CANCELLED | OUTPATIENT
Start: 2021-08-18

## 2021-08-18 RX ORDER — ONDANSETRON 2 MG/ML
4 INJECTION INTRAMUSCULAR; INTRAVENOUS ONCE AS NEEDED
Status: DISCONTINUED | OUTPATIENT
Start: 2021-08-18 | End: 2021-08-21 | Stop reason: HOSPADM

## 2021-08-18 RX ORDER — ONDANSETRON 2 MG/ML
4 INJECTION INTRAMUSCULAR; INTRAVENOUS ONCE AS NEEDED
Status: CANCELLED | OUTPATIENT
Start: 2021-08-18

## 2021-08-18 RX ORDER — ALBUTEROL SULFATE 90 UG/1
3 AEROSOL, METERED RESPIRATORY (INHALATION) ONCE AS NEEDED
Status: DISCONTINUED | OUTPATIENT
Start: 2021-08-18 | End: 2021-08-21 | Stop reason: HOSPADM

## 2021-08-18 RX ADMIN — IMDEVIMAB: 1332 INJECTION, SOLUTION, CONCENTRATE INTRAVENOUS at 18:01

## 2021-08-18 RX ADMIN — SODIUM CHLORIDE 20 ML/HR: 0.9 INJECTION, SOLUTION INTRAVENOUS at 18:03

## 2021-08-18 NOTE — PROGRESS NOTES
Patient tolerated Regeneron (Casirivimab and Imdevimab) infusion and 1 hour post observation without incident  IV discontinued, catheter intact  Gauze applied to site  Discharge instructions reviewed with patient verbally  Copy of discharge instructions given to patient  Patient verbalized understanding of all discharge instructions  Patient discharged without incident  Patient has f/u with pcp tomorrow

## 2021-08-18 NOTE — PROGRESS NOTES
Patient is here today for their Regeneron (Casirivimab and Imdevimab) infusion  Reviewed EUA with patient  Patient verbalized understanding of EUA  Copy of EUA given to patient  All questions answered to patients satisfaction  IV started, good blood return, flushes freely  Patient tolerated well  Patient gave verbal consent to receive treatment  Infusion started, patient asked to verbalize any new symptoms

## 2021-08-19 ENCOUNTER — TELEMEDICINE (OUTPATIENT)
Dept: FAMILY MEDICINE CLINIC | Facility: CLINIC | Age: 71
End: 2021-08-19
Payer: MEDICARE

## 2021-08-19 VITALS
TEMPERATURE: 98.6 F | HEIGHT: 63 IN | BODY MASS INDEX: 31.36 KG/M2 | OXYGEN SATURATION: 94 % | DIASTOLIC BLOOD PRESSURE: 72 MMHG | WEIGHT: 177 LBS | HEART RATE: 80 BPM | SYSTOLIC BLOOD PRESSURE: 125 MMHG

## 2021-08-19 DIAGNOSIS — U07.1 COVID-19 VIRUS INFECTION: Primary | ICD-10-CM

## 2021-08-19 PROCEDURE — 99442 PR PHYS/QHP TELEPHONE EVALUATION 11-20 MIN: CPT | Performed by: FAMILY MEDICINE

## 2021-08-19 NOTE — PROGRESS NOTES
COVID-19 Outpatient Progress Note    Assessment/Plan:    Problem List Items Addressed This Visit        Other    COVID-19 virus infection - Primary         Disposition:     I recommended continued isolation until at least 24 hours have passed since recovery defined as resolution of fever without the use of fever-reducing medications AND improvement in COVID symptoms AND 10 days have passed since onset of symptoms (or 10 days have passed since date of first positive viral diagnostic test for asymptomatic patients)  Reviewed ER precautions  I have spent 15 minutes directly with the patient  Greater than 50% of this time was spent in counseling/coordination of care regarding: risks and benefits of treatment options  She will call on Monday or message me on [employer] to let me know how she is doing  Verification of patient location:    Patient is located in the following state in which I hold an active license [place]    Encounter provider Hansa Peoples MD    Provider located at [address] [facility]  [address] [id number] [phone]    Recent Visits  Date Type Provider Dept   08/16/21 Dianne Chery MD [facility]   08/16/21 Telephone Hansa Peoples MD [facility] recent visits within past 7 days and meeting all other requirements  Today's Visits  Date Type Provider Dept   08/19/21 Telemedicine Hansa Peoples MD [address] today's visits and meeting all other requirements  Future Appointments  No visits were found meeting these conditions  Showing future appointments within next 150 days and meeting all other requirements     This virtual check-in was done via telephone and she agrees to proceed  Patient agrees to participate in a virtual check in via telephone or video visit instead of presenting to the office to address urgent/immediate medical needs   Patient is aware this is a billable service  After connecting through Telephone, the patient was identified by name and date of birth  Lazarus Mora was informed that this was a telemedicine visit and that the exam was being conducted confidentially over secure lines  My office door was closed  No one else was in the room  Lazarus Mora acknowledged consent and understanding of privacy and security of the telemedicine visit  I informed the patient that I have reviewed her record in Epic and presented the opportunity for her to ask any questions regarding the visit today  The patient agreed to participate  It was my intent to perform this visit via video technology but the patient was not able to do a video connection so the visit was completed via audio telephone only  Subjective:   Lazarus Mora is a 66 y o  female who has been screened for COVID-19  Symptom change since last report: improving  Patient's symptoms include fatigue, nasal congestion, cough, shortness of breath, nausea and headache (improving)  Patient denies fever (feels warmer than normal, but no fevers), anosmia, loss of taste, chest tightness, abdominal pain, vomiting and diarrhea  Date of symptom onset: 8/13/2021  Date of positive COVID-19 PCR: 8/15/2021  COVID-19 vaccination status: Fully vaccinated    Carolann Luo has been staying home and has isolated themselves in her home  She is taking care to not share personal items and     Monoclonal Antibody Follow-up Symptom Questionnaire  I feel overall: somewhat better  My breathing is: similar  My fever is: better  My fatigue is: somewhat better    Feels like her congestion is improving  Feels like her breathing is fine- ok sitting, more SOB when she walks to the bathroom but feels fine once she sits down  She slept much better last night  Using diabetic tussin which is helping        Lab Results   Component Value Date    SARSCOV2 Negative 02/04/2021    SARSCOV2 Not Detected 08/01/2020    SARSCORONAVI Detected (A) 08/15/2021 Past Medical History:   Diagnosis Date    Allergic     Anxiety     Arthritis     Aspiration into airway     Basal cell carcinoma 2007    left cheek     BCC (basal cell carcinoma) 05/27/2021    Left Nasal tip    Cancer (HCC)     squamous cell cancer on forhead    Cancer (HCC)     basal cell on nose     Chronic kidney disease 2000, 2018    Stones, kidney disease stage 4    CPAP (continuous positive airway pressure) dependence     Diabetes mellitus (HCC)     Disease of thyroid gland     Family history of thyroid problem     GERD (gastroesophageal reflux disease)     Heart burn     Hyperlipidemia     Hyperplasia, parathyroid ([address] )     Hypertension     Kidney problem     Kidney stone     Obesity (BMI 30 0-34  9)     Pneumonia     RLS (restless legs syndrome)     SCC (squamous cell carcinoma) 05/04/2021    left mid forehead    Seasonal allergies     Sleep apnea     uses CPAP    Squamous cell skin cancer 2007    left cheek     Swollen ankles      Past Surgical History:   Procedure Laterality Date    ARTHROSCOPY KNEE      BREAST BIOPSY      stereotactic-benign    BREAST BIOPSY      stereo-benign    BREAST EXCISIONAL BIOPSY      unknown date-benign    BREAST EXCISIONAL BIOPSY      unknown date-benign    BREAST EXCISIONAL BIOPSY      unknown date-benign    BREAST EXCISIONAL BIOPSY      unknown age-benign    CHOLECYSTECTOMY      COLONOSCOPY      EXAMINATION UNDER ANESTHESIA N/A 6/24/2021    Procedure: EXAM UNDER ANESTHESIA (EUA), DISE;  Surgeon: Akhil Lopez MD;  [facility] MAIN [facility];  Service: ENT    HERNIA REPAIR      HIATAL HERNIA REPAIR      LIPOSUCTION      MOHS SURGERY  05/20/2021    left mid forehead-Gautam    MOHS SURGERY Left 05/27/2021    Left nasal tip- gautam     REDUCTION MAMMAPLASTY Bilateral 2000    SKIN BIOPSY      SKIN CANCER EXCISION  2007    squamous cell carcinoma     SKIN CANCER EXCISION  2007    basal cell carcinoma    SQUAMOUS CELL CARCINOMA EXCISION      TOE SURGERY      TONSILLECTOMY      TUBAL LIGATION      UPPER GASTROINTESTINAL ENDOSCOPY       Current Outpatient Medications   Medication Sig Dispense Refill    acetaminophen (TYLENOL) 500 mg tablet Take 1,000 mg by mouth as needed       albuterol (Ventolin HFA) 90 mcg/act inhaler Inhale 2 puffs every 6 (six) hours as needed for wheezing or shortness of breath  0    amLODIPine (NORVASC) 5 mg tablet TAKE 1 TABLET BY MOUTH EVERY DAY 90 tablet 3    b complex vitamins tablet Take 1 tablet by mouth daily       B-D ULTRAFINE III SHORT PEN 31G X 8 MM MISC USE AS DIRECTED 4 TIMES PER DAY  3    DULoxetine (Cymbalta) 30 mg delayed release capsule Take 30 mg by mouth daily      DULoxetine (CYMBALTA) 60 mg delayed release capsule Take 90 mg by mouth daily      famotidine (PEPCID) 40 MG tablet Take 1 tablet (40 mg total) by mouth daily Take daily in am 90 tablet 3    fluticasone (FLONASE) 50 mcg/act nasal spray 1-2 sprays daily       gabapentin (NEURONTIN) 300 mg capsule Take 300 mg by mouth daily       Icosapent Ethyl (Vascepa) 1 g CAPS Take 1 g by mouth 2 (two) times a day      insulin aspart (NovoLOG FlexPen) 100 UNIT/ML injection pen Inject 18-24 Units under the skin       insulin aspart (NovoLOG) 100 units/mL injection Inject under the skin 3 (three) times a day before meals 14 units, 14 units, 18 units   insulin aspart, w/niacinamide, (FIASP) 100 Units/mL injection pen Inject 14 units at breakfast and lunch then 18 units at dinner  Take 4 extra units when on steroid   Take 20 minutes before meals      insulin detemir (Levemir FlexTouch) 100 Units/mL injection pen Inject 45 Units under the skin       insulin detemir (LEVEMIR) 100 units/mL subcutaneous injection Inject 32 Units under the skin daily at bedtime (Patient taking differently: Inject 50 Units under the skin daily at bedtime )  0    levothyroxine 137 mcg tablet Take 137 mcg by mouth      magnesium gluconate (MAGONATE) 500 MG tablet 500 mg      metoprolol tartrate (LOPRESSOR) 50 mg tablet Take 1 tablet (50 mg total) by mouth every 12 (twelve) hours 180 tablet 3    Multiple Vitamin (multivitamin) capsule Take 1 capsule by mouth daily      olmesartan (BENICAR) 40 mg tablet TAKE 1 TABLET BY MOUTH EVERY DAY 90 tablet 0    pantoprazole (PROTONIX) 20 mg tablet Take 2 tablets (40 mg total) by mouth daily 60 tablet 5    polyethylene glycol (MIRALAX) 17 g packet Take 17 g by mouth daily      pramipexole (MIRAPEX) 0 25 mg tablet Take 1 tablet (0 25 mg total) by mouth daily 90 tablet 3    Prucalopride Succinate 1 MG TABS Take 1 mg by mouth daily 30 tablet 5    QUEtiapine (SEROquel) 25 mg tablet Take 1 tablet (25 mg total) by mouth daily at bedtime 30 tablet 5    rosuvastatin (CRESTOR) 5 mg tablet TAKE 1 TABLET BY MOUTH EVERY DAY AT NIGHT      torsemide (DEMADEX) 20 mg tablet TAKE 1 TABLET BY MOUTH EVERY DAY 90 tablet 3    Vitamin D, Ergocalciferol, 2000 units CAPS Take 2,000 Units by mouth daily       bisacodyl (DULCOLAX) 5 mg EC tablet Take 2 tablets (10 mg total) by mouth once for 1 dose 2 tablet 0    polyethylene glycol (GOLYTELY) 4000 mL solution Take 4,000 mL by mouth once for 1 dose (Patient not taking: Reported on 8/16/2021) 4000 mL 0     No current facility-administered medications for this visit  Facility-Administered Medications Ordered in Other Visits   Medication Dose Route Frequency Provider Last Rate Last Admin    acetaminophen (TYLENOL) tablet 650 mg  650 mg Oral Once PRN Lamont Green MD        albuterol (PROVENTIL HFA,VENTOLIN HFA) inhaler 3 puff  3 puff Inhalation Once PRN Lamont Green MD        ondansetron [facility]) injection 4 mg  4 mg Intravenous Once PRN Lamont Green MD         Allergies   Allergen Reactions    Ciprofloxacin Hives and Itching       Review of Systems   Constitutional: Positive for fatigue  Negative for fever (feels warmer than normal, but no fevers)     HENT: Positive for congestion  Respiratory: Positive for cough and shortness of breath  Negative for chest tightness  Gastrointestinal: Positive for nausea  Negative for abdominal pain, diarrhea and vomiting  Neurological: Positive for headaches (improving)  Objective:    Vitals:    08/19/21 1102   BP: 125/72   Pulse: 80   Temp: 98 6 °F (37 °C)   SpO2: 94%   Weight: 80 3 kg (177 lb)   Height: 5' 3" (1 6 m)       Physical Exam  Dry cough, but able to speak in full sentences without SOB or cough  VIRTUAL VISIT DISCLAIMER    Esteban Hatch verbally agrees to participate in [place]  Pt is aware that Yatra could be limited without vital signs or the ability to perform a full hands-on physical exam  Gabriella Fuentes understands she or the provider may request at any time to terminate the video visit and request the patient to seek care or treatment in person

## 2021-08-23 ENCOUNTER — TELEPHONE (OUTPATIENT)
Dept: FAMILY MEDICINE CLINIC | Facility: CLINIC | Age: 71
End: 2021-08-23

## 2021-08-25 NOTE — TELEPHONE ENCOUNTER
Called and spoke to patient medicare part D plan submitting the auth for the motegrity     ID Number:  [id number]    Phone number:  [phone]    Reference number:  [id number]    Call Back Number:  [phone] opt 2

## 2021-09-01 NOTE — PROGRESS NOTES
RENAL FOLLOW UP NOTE: td     ASSESSMENT AND PLAN:  1   CKD stage 4 :  · Etiology:  Diabetic nephropathy/hypertensive nephrosclerosis/arteriolar nephrosclerosis/chronic NSAID use   No evidence of obstructive uropathy, renal artery disease or primary glomerular disease with a bland urinalysis  Of note, CPK was normal at 105 no evidence rhabdomyolysis    · Baseline creatinine:  1 5-2 0  · Current creatinine:  1 73 at baseline  · Urine protein creatinine ratio:  0 87 g at goal!!  · UA demonstrated no significant hematuria but 3+ proteinuria from  · Serologies:  Normal C3/C4; negative rheumatoid factor; negative SPEP:  Negative UPEP;  Light chain ratio 2 09 essentially normal for chronic kidney disease  negative EWELINA; negative hepatitis-C; normal IgA; normal ASO; negative RPR  Recommendations:  · Treat hypertension-please see below  · Treat dyslipidemia-please see below  · Maintain proteinuria less than 1 g or as low as possible  · Avoid nephrotoxic agents such as NSAIDs, patient counseled as such     2   Volume:   · Current status:  Euvolemic  · Torsemide dose:  Continue current dose  3   Hypertension:  Workup:  · saline suppression test for primary aldosterone state was normal  · plasma free metanephrines was negative  · renal artery duplex was negative 02/2019       Current blood pressure averages:   A m :  143/75, no orthostatic changes  P m :  143/76, standing 131/74  Heart rate:  60-70 range        · Goal blood pressure:  less than 130/80 given less than 1 g of proteinuria at this time  Recommendations:  · Push nonmedical regimen including weight loss, isotonic exercise and avoidance of salt; patient counseled as such  · Medication changes today:   · Hydralazine 25 mg twice a day  · Recheck blood pressures in about 2 weeks  4   Electrolytes:  all acceptable including a potassium of 4 5: Please see below  5   Mineral bone disorder:  · Calcium/magnesium/phosphorus:  All acceptable, only borderline high magnesium at 2 3, avoid magnesium products  · PTH intact:  134 0 slightly elevated but will monitor for now and treat if vitamin-D is low  · Vitamin-D:  34 from August 30, 2021  6   Dyslipidemia:  · Goal LDL:  Less than 100  · Current lipid profile:  LDL 34/HDL 39/triglycerides 354 from 05/24/2020  Recommendations:  Per Endocrinology but essentially at goal  7   Anemia:   · Current hemoglobin  12 9 essentially normal  8   Other problems:  · Depression  · Diabetes mellitus per primary medical physician  · Hypothyroidism  · BARBARA on CPAP  · Fibromyalgia/osteoarthritis:  Patient with myoclonic movements, seems related to gabapentin it was adjusted with essential resolution  · Nephrolithiasis  · Basal cell/squamous CA of the skin  · Urinary stress incontinence  · Status post incisional hernia repairs  · Recent hospitalization as outlined below from severe GERD with hypoxemia and shortness of breath from a failed Nissen fundoplication from prior hiatal hernia repair   Patient is due to have further testing with an EGD by GI and then subsequently thoracic surgery consultation for possible repair(however, according to the patient at this juncture she was felt I risk so no surgery is planned at this time)  In addition, she was placed on Protonix 40 b i d  and Pepcid 40 mg hs  · Hospitalization on 07/14/2020 secondary confusion at home   Apparently she had protracted hospital course in [place] following aspiration pneumonia   Ten days in the ICU on a ventilator   No overt infectious process   Low probability for pulmonary embolus despite an elevated D-dimer   Had mild leukocytosis/SIRS criteria subsequently discharged 1 day later when she clinically improved   Symptoms were felt secondary to her protracted ICU course  · Recent hospitalization with discharge on 05/18/21:  · Epigastric pain following an EGD on 05/13/2021 for Botox injection at the pylorus for treatment for gastroparesis    Apparently associated with chocolate ingestion and possible cough with aspiration  · Complicated by ANDRA with creatinine to 2 6 which improved with IV fluids TO 1 92 ON 05/18/2021       · Patient with chest pressure on 09/09/2021:  Discussed with Dr Johnnie Sultana the patient's cardiologist   She recommended a close follow-up in the office which she will arrange; and also if the patient develops any symptoms until then to go right to the emergency room  Patient was instructed to do so in understands recommendations will also follow-up with Dr Johnnie Sultana      GI health maintenance:  Last colonoscopy:  According to the patient less than 5 years ago; GI has seen her and is planning a colonoscopy [facility])       PATIENT INSTRUCTIONS:    Patient Instructions   1  Medication changes today:   Please begin hydralazine 25 mg twice a day for your blood pressure, if he noticed dizziness or lightheadedness with a low blood pressure please contact me right away   Please continue all other medications    2  PLEASE CONTACT DR SLAUGHTER THE CARDIOLOGIST IF YOU DO NOT HEAR FROM HER OFFICE BY MONDAY  IF YOU DEVELOPED ANY CHEST DISCOMFORT WHETHER PRESSURE OR SHARP PAIN OR ANY OTHER UNUSUAL SYMPTOMS SUCH AS SHORTNESS OF BREATH PLEASE GO RIGHT TO THE EMERGENCY ROOM!       3  Please take 1 week a blood pressure readings  2 weeks after making the above medication change or if you feel symptoms of lightheadedness or dizziness     AS FOLLOWS  MORNING AND EVENING, SITTING AND STANDING as follows:  · TAKE THE MORNING READINGS BEFORE ANY MEDICATIONS AND WHEN YOU ARE RELAXED FOR SEVERAL MINUTES  · TAKE THE EVENING READINGS:  BETWEEN 7-10 P M ; PRIOR TO ANY MEDICATIONS; AT LEAST IN OUR  FROM DINNER; AND CERTAINLY AFTER RELAXING FOR A FEW MINUTES  · PLEASE INCLUDE HEART RATE WITH YOUR BLOOD PRESSURE READINGS  · When taking standing readings, keep your arm supported at heart level and not dangling  · Make sure you are sitting with your back supported and feet on the ground and do not cross your legs or feet  · Make sure you have not taken any coffee or caffeine products or exercised or smoke cigarettes at least 30 minutes before taking your blood pressure  Then please mail these readings into the office    4  Follow-up in 4  months   Please bring in 1 week a blood pressure readings morning evening, sitting and standing is outlined above   Please go for fasting lab work 1-2 weeks prior to your appointment      5  General instructions:   AVOID SALT BUT NOT ADDING AN READING LABELS TO MAKE SURE THERE IS LOW-SALT IN THE FOOD THAT YOU ARE EATING  o Goal is less than 2 g of sodium intake or less than 5 g of sodium chloride intake per day     Avoid nonsteroidal anti-inflammatory drugs such as Naprosyn, ibuprofen, Aleve, Advil, Celebrex, Meloxicam (Mobic) etc   You can use Tylenol as needed if you do not have any liver condition to be concerned about     Avoid medications such as Sudafed or decongestants and antihistamines that contained the D component which is the decongestant  You can take antihistamines without the decongestant or D component   Try to avoid medications such as pantoprazole or  Protonix/Nexium or Esomeprazole)/Prilosec or omeprazole/Prevacid or lansoprazole/AcipHex or Rabeprazole  If you are able to, use Pepcid as this is safer for your kidneys   Try to exercise at least 30 minutes 3 days a week to begin with with an ultimate goal of 5 days a week for at least 30 minutes     Try to lose 5-10 lb by your next visit     Please do not drink more than 2 glasses of alcohol/wine on a daily basis as this may contribute to your high blood pressure  Subjective:   Patient had COVID 19 infection the end of July or early August treated with antibodies with essential resolution of symptoms  Hospitalization in May please see above  No fevers, chills, or cough or colds    Fair to poor appetite and fair to poor energy  No change in weight  No hematuria, dysuria, voiding symptoms or foamy urine  No nausea vomiting, diarrhea which she did have with COVID has improved  Slight amount of abdominal cramps  She did have chest pressure yesterday lasted a couple hours, she was not doing anything it cleared on its own she did have a headache, no shortness of breath and did not go anywhere else  She does not follow with Cardiology on regular basis did see Dr Aretha Krabbe about a 1/2 years ago  No leg swelling  Positive headaches, no dizziness or lightheadedness (she did month and half ago when her blood pressure was low)  Blood pressure medications:   Torsemide 20 mg daily morning   Olmesartan 40 mg daily in morning   Metoprolol tartrate 50 mg twice a day   Amlodipine 5 mg daily at dinner        ROS:  See HPI, otherwise review of systems as completely reviewed with the patient are negative    Past Medical History:   Diagnosis Date    Allergic     Anxiety     Arthritis     Aspiration into airway     Basal cell carcinoma 2007    left cheek     BCC (basal cell carcinoma) 05/27/2021    Left Nasal tip    Cancer (HCC)     squamous cell cancer on forhead    Cancer ([facility] [address] )     basal cell on nose     Chronic kidney disease 2000, 2018    Stones, kidney disease stage 4    COVID-19 08/2021    CPAP (continuous positive airway pressure) dependence     Diabetes mellitus ([address] )     Disease of thyroid gland     Family history of thyroid problem     GERD (gastroesophageal reflux disease)     Heart burn     Hyperlipidemia     Hyperplasia, parathyroid ([address] )     Hypertension     Kidney problem     Kidney stone     Obesity (BMI 30 0-34  9)     Pneumonia     RLS (restless legs syndrome)     SCC (squamous cell carcinoma) 05/04/2021    left mid forehead    Seasonal allergies     Sleep apnea     uses CPAP    Squamous cell skin cancer 2007    left cheek     Swollen ankles      Past Surgical History:   Procedure Laterality Date    ARTHROSCOPY KNEE      BREAST BIOPSY stereotactic-benign    BREAST BIOPSY      stereo-benign    BREAST EXCISIONAL BIOPSY      unknown date-benign    BREAST EXCISIONAL BIOPSY      unknown date-benign    BREAST EXCISIONAL BIOPSY      unknown date-benign    BREAST EXCISIONAL BIOPSY      unknown age-benign    CHOLECYSTECTOMY      COLONOSCOPY      EXAMINATION UNDER ANESTHESIA N/A 6/24/2021    Procedure: EXAM UNDER ANESTHESIA (EUA), DISE;  Surgeon: Cheyenne Napier MD;  [facility];  Service: ENT    HERNIA REPAIR      HIATAL HERNIA REPAIR      LIPOSUCTION      MOHS SURGERY  05/20/2021    left mid forehead-Gautam    MOHS SURGERY Left 05/27/2021    Left nasal tip- gautam     REDUCTION MAMMAPLASTY Bilateral 2000    SKIN BIOPSY      SKIN CANCER EXCISION  2007    squamous cell carcinoma     SKIN CANCER EXCISION  2007    basal cell carcinoma    SQUAMOUS CELL CARCINOMA EXCISION      TOE SURGERY      TONSILLECTOMY      TUBAL LIGATION      UPPER GASTROINTESTINAL ENDOSCOPY       Family History   Problem Relation Age of Onset    Heart disease Mother     Depression Mother     Hypertension Mother     COPD Mother     Hearing loss Mother     Heart disease Father     Lung cancer Father 79        Smoker     Cancer Father         brain    Alcohol abuse Father     Dementia Father     Hypertension Father     Thyroid disease Father     COPD Father     Arthritis Father     Brain cancer Father 76    Hypertension Sister     Diabetes Sister     Heart disease Sister     Thyroid disease Sister     Cancer Sister         Lympoma    Lung cancer Sister 78    Hypertension Brother     Diabetes Brother     Cancer Brother         Throat    Dementia Brother     Stroke Brother     Hypertension Brother     No Known Problems Son     No Known Problems Son     Heart disease Brother     Diabetes Brother     Brain cancer Paternal Aunt         unknown age      reports that she quit smoking about 45 years ago   Her smoking use included cigarettes  She started smoking about 53 years ago  She has a 20 00 pack-year smoking history  She has never used smokeless tobacco  She reports previous alcohol use  She reports that she does not use drugs  I COMPLETELY REVIEWED THE PAST MEDICAL HISTORY/PAST SURGICAL HISTORY/SOCIAL HISTORY/FAMILY HISTORY/AND MEDICATIONS  AND UPDATED ALL    Objective:     Vitals:   BP sitting on right:  130/70 with a heart rate of 60 and regular same on left  BP standing on left:  124/68 with a heart rate of 68 and regular    Weight (last 2 days)     Date/Time   Weight    09/10/21 1249   77 1 (170)            Wt Readings from Last 3 Encounters:   09/10/21 77 1 kg (170 lb)   09/03/21 78 kg (172 lb)   08/19/21 80 3 kg (177 lb)       Body mass index is 30 11 kg/m²      Physical Exam: General:  No acute distress/obese  Skin:  No acute rash  Eyes:  No scleral icterus, noninjected, no discharge from eyes  ENT:  Moist mucous membranes  Neck:  Supple, no jugular venous distention, trachea is midline, no lymphadenopathy and no thyromegaly  Back   No CVAT  Chest:  Clear to auscultation and percussion, good respiratory effort  CVS:  Regular rate and rhythm without a rub, or gallops or murmurs  Abdomen:  Obese,Soft and nontender with normal bowel sounds  Extremities:  No cyanosis and no edema, no arthritic changes, normal range of motion  Neuro:  Grossly intact  Psych:  Alert, oriented x3 and appropriate      Medications:    Current Outpatient Medications:     acetaminophen (TYLENOL) 500 mg tablet, Take 1,000 mg by mouth as needed , Disp: , Rfl:     albuterol (Ventolin HFA) 90 mcg/act inhaler, Inhale 2 puffs every 6 (six) hours as needed for wheezing or shortness of breath, Disp: , Rfl: 0    amLODIPine (NORVASC) 5 mg tablet, TAKE 1 TABLET BY MOUTH EVERY DAY, Disp: 90 tablet, Rfl: 3    b complex vitamins tablet, Take 1 tablet by mouth daily , Disp: , Rfl:     B-D ULTRAFINE III SHORT PEN 31G X 8 MM MISC, USE AS DIRECTED 4 TIMES PER DAY, Disp: , Rfl: 3    bisacodyl (DULCOLAX) 5 mg EC tablet, Take 2 tablets (10 mg total) by mouth once for 1 dose, Disp: 2 tablet, Rfl: 0    DULoxetine (Cymbalta) 30 mg delayed release capsule, Take 30 mg by mouth daily, Disp: , Rfl:     DULoxetine (CYMBALTA) 60 mg delayed release capsule, Take 90 mg by mouth daily, Disp: , Rfl:     famotidine (PEPCID) 40 MG tablet, Take 1 tablet (40 mg total) by mouth daily Take daily in am, Disp: 90 tablet, Rfl: 3    fluticasone (FLONASE) 50 mcg/act nasal spray, 1-2 sprays daily , Disp: , Rfl:     gabapentin (NEURONTIN) 300 mg capsule, Take 300 mg by mouth daily , Disp: , Rfl:     Icosapent Ethyl (Vascepa) 1 g CAPS, Take 1 g by mouth 2 (two) times a day, Disp: , Rfl:     insulin aspart (NovoLOG FlexPen) 100 UNIT/ML injection pen, Inject 18-24 Units under the skin , Disp: , Rfl:     insulin aspart (NovoLOG) 100 units/mL injection, Inject under the skin 3 (three) times a day before meals 14 units, 14 units, 18 units  , Disp: , Rfl:     insulin aspart, w/niacinamide, (FIASP) 100 Units/mL injection pen, Inject 14 units at breakfast and lunch then 18 units at dinner  Take 4 extra units when on steroid   Take 20 minutes before meals, Disp: , Rfl:     insulin detemir (Levemir FlexTouch) 100 Units/mL injection pen, Inject 45 Units under the skin , Disp: , Rfl:     insulin detemir (LEVEMIR) 100 units/mL subcutaneous injection, Inject 32 Units under the skin daily at bedtime (Patient taking differently: Inject 50 Units under the skin daily at bedtime ), Disp: , Rfl: 0    levothyroxine 137 mcg tablet, Take 137 mcg by mouth, Disp: , Rfl:     metoprolol tartrate (LOPRESSOR) 50 mg tablet, Take 1 tablet (50 mg total) by mouth every 12 (twelve) hours, Disp: 180 tablet, Rfl: 3    Multiple Vitamin (multivitamin) capsule, Take 1 capsule by mouth daily, Disp: , Rfl:     olmesartan (BENICAR) 40 mg tablet, TAKE 1 TABLET BY MOUTH EVERY DAY, Disp: 90 tablet, Rfl: 0    polyethylene glycol (MIRALAX) 17 g packet, Take 17 g by mouth daily, Disp: , Rfl:     pramipexole (MIRAPEX) 0 25 mg tablet, Take 1 tablet (0 25 mg total) by mouth daily, Disp: 90 tablet, Rfl: 3    QUEtiapine (SEROquel) 25 mg tablet, Take 1 tablet (25 mg total) by mouth daily at bedtime, Disp: 30 tablet, Rfl: 5    rosuvastatin (CRESTOR) 5 mg tablet, TAKE 1 TABLET BY MOUTH EVERY DAY AT NIGHT, Disp: , Rfl:     torsemide (DEMADEX) 20 mg tablet, TAKE 1 TABLET BY MOUTH EVERY DAY, Disp: 90 tablet, Rfl: 3    Vitamin D, Ergocalciferol, 2000 units CAPS, Take 2,000 Units by mouth daily , Disp: , Rfl:     hydrALAZINE (APRESOLINE) 25 mg tablet, Take 1 tablet (25 mg total) by mouth 2 (two) times a day, Disp: 60 tablet, Rfl: 5    pantoprazole (PROTONIX) 20 mg tablet, Take 2 tablets (40 mg total) by mouth daily (Patient not taking: Reported on 9/10/2021), Disp: 60 tablet, Rfl: 5    Prucalopride Succinate 1 MG TABS, Take 1 mg by mouth daily (Patient not taking: Reported on 9/10/2021), Disp: 30 tablet, Rfl: 5    Lab, Imaging and other studies: I have personally reviewed pertinent labs  Laboratory Results:  Results for orders placed or performed in visit on 08/30/21   Protein / creatinine ratio, urine   Result Value Ref Range    PROTEIN UA 67 5     EXT Creatinine Urine 77 4     EXTERNAL Ur Prot/Creat Ratio 0 87              Invalid input(s): ALBUMIN      Radiology review:   chest X-ray    Ultrasound      Portions of the record may have been created with voice recognition software  Occasional wrong word or "sound a like" substitutions may have occurred due to the inherent limitations of voice recognition software  Read the chart carefully and recognize, using context, where substitutions have occurred

## 2021-09-03 ENCOUNTER — OFFICE VISIT (OUTPATIENT)
Dept: DERMATOLOGY | Facility: CLINIC | Age: 71
End: 2021-09-03
Payer: MEDICARE

## 2021-09-03 VITALS — TEMPERATURE: 98.3 F | WEIGHT: 172 LBS | HEIGHT: 63 IN | BODY MASS INDEX: 30.48 KG/M2

## 2021-09-03 DIAGNOSIS — Z85.828 HISTORY OF BASAL CELL CARCINOMA: Primary | ICD-10-CM

## 2021-09-03 DIAGNOSIS — Z85.89 HISTORY OF SQUAMOUS CELL CARCINOMA: ICD-10-CM

## 2021-09-03 PROCEDURE — 99212 OFFICE O/P EST SF 10 MIN: CPT | Performed by: DERMATOLOGY

## 2021-09-03 NOTE — PATIENT INSTRUCTIONS
1  HISTORY OF BASAL CELL CARCINOMA    Physical Exam:   Anatomic Location Affected:  Left nasal tip, left cheek   Morphological Description of scar:  Well healed scar on cheek; 0 65 cm graft within normal limits, left tip/supra-tip   Suspected Recurrence: No      Additional History of Present Condition:  History of basal cell carcinoma with no sign of recurrence  Status post Mohs surgery 5/27/2021 on left nasal tip  Patient states the skin graft she had done swelled, she presented to the emergency department for it and was told it was ok  Dr Jeanette Cam did a Kenalog injection in the area and use the Pulsed Eye Laser on the area  Assessment and Plan:  Based on a thorough discussion of this condition and the management approach to it (including a comprehensive discussion of the known risks, side effects and potential benefits of treatment), the patient (family) agrees to implement the following specific plan:   Monitor for changes   Continue regular skin checks with Dermatology   Apply SPF 50 or higher multiple times a day  Wear sun protecting clothing and hats   Discussed treatment options such as laser treatments   It should improve gradually on it's own   Continue using scar treatment silicone patches if desired   Follow up with Dr Jeanette Cam when she returns to the practice  How can basal cell carcinoma be prevented? The most important way to prevent BCC is to avoid sunburn  This is especially important in childhood and early life  Fair skinned individuals and those with a personal or family history of BCC should protect their skin from sun exposure daily, year-round and lifelong     Stay indoors or under the shade in the middle of the day    Wear covering clothing    Apply high protection factor SPF50+ broad-spectrum sunscreens generously to exposed skin if outdoors    Avoid indoor tanning (sun beds, solaria)   Oral nicotinamide (vitamin B3) in a dose of 500 mg twice daily may reduce the number and severity of BCCs  What is the outlook for basal cell carcinoma? Most BCCs are cured by treatment  Cure is most likely if treatment is undertaken when the lesion is small  About 50% of people with BCC develop a second one within 3 years of the first  They are also at increased risk of other skin cancers, especially melanoma  Regular self-skin examinations and long-term annual skin checks by an experienced health professional are recommended  2  HISTORY OF SQUAMOUS CELL CARCINOMA     Physical Exam:   Anatomic Location Affected:  Left mid forehead, left cheek   Morphological Description of Scar:  Well healed scar on forehead and cheek   Suspected Recurrence: no   Regional adenopathy: no    Additional History of Present Condition:  Status post Mohs surgery, 5/20/2021, on forehead  Assessment and Plan:  Based on a thorough discussion of this condition and the management approach to it (including a comprehensive discussion of the known risks, side effects and potential benefits of treatment), the patient (family) agrees to implement the following specific plan:   Monitor for changes   Continue regular skin checks with Dermatology   Apply SPF 50 or higher multiple times a day  Wear sun protecting clothing and hats  How can SCC be prevented? The most important way to prevent SCC is to avoid sunburn  This is especially important in childhood and early life  Fair skinned individuals and those with a personal or family history of BCC should protect their skin from sun exposure daily, year-round and lifelong   Stay indoors or under the shade in the middle of the day    Wear covering clothing    Apply high protection factor SPF50+ broad-spectrum sunscreens generously to exposed skin if outdoors    Avoid indoor tanning (sun beds, solaria)      What is the outlook for SCC? Most SCC are cured by treatment  Cure is most likely if treatment is undertaken when the lesion is small   A small percent of SCC's can spread to lymph  nodes and long term monitoring is indicated  They are also at increased risk of other skin cancers, especially melanoma  Regular self-skin examinations and long-term annual skin checks by an experienced health professional are recommended

## 2021-09-03 NOTE — PROGRESS NOTES
[facility] Follow Up Note    Patient Name: Arash Carlos  Encounter Date: 9/3/2021    Today's Chief Concerns:   Concern #1:  Wound check on nose   Concern #2:     Concern #3:      Current Medications:    Current Outpatient Medications:     acetaminophen (TYLENOL) 500 mg tablet, Take 1,000 mg by mouth as needed , Disp: , Rfl:     albuterol (Ventolin HFA) 90 mcg/act inhaler, Inhale 2 puffs every 6 (six) hours as needed for wheezing or shortness of breath, Disp: , Rfl: 0    amLODIPine (NORVASC) 5 mg tablet, TAKE 1 TABLET BY MOUTH EVERY DAY, Disp: 90 tablet, Rfl: 3    b complex vitamins tablet, Take 1 tablet by mouth daily , Disp: , Rfl:     B-D ULTRAFINE III SHORT PEN 31G X 8 MM MISC, USE AS DIRECTED 4 TIMES PER DAY, Disp: , Rfl: 3    DULoxetine (Cymbalta) 30 mg delayed release capsule, Take 30 mg by mouth daily, Disp: , Rfl:     DULoxetine (CYMBALTA) 60 mg delayed release capsule, Take 90 mg by mouth daily, Disp: , Rfl:     famotidine (PEPCID) 40 MG tablet, Take 1 tablet (40 mg total) by mouth daily Take daily in am, Disp: 90 tablet, Rfl: 3    fluticasone (FLONASE) 50 mcg/act nasal spray, 1-2 sprays daily , Disp: , Rfl:     gabapentin (NEURONTIN) 300 mg capsule, Take 300 mg by mouth daily , Disp: , Rfl:     Icosapent Ethyl (Vascepa) 1 g CAPS, Take 1 g by mouth 2 (two) times a day, Disp: , Rfl:     insulin aspart (NovoLOG FlexPen) 100 UNIT/ML injection pen, Inject 18-24 Units under the skin , Disp: , Rfl:     insulin aspart (NovoLOG) 100 units/mL injection, Inject under the skin 3 (three) times a day before meals 14 units, 14 units, 18 units  , Disp: , Rfl:     insulin aspart, w/niacinamide, (FIASP) 100 Units/mL injection pen, Inject 14 units at breakfast and lunch then 18 units at dinner  Take 4 extra units when on steroid   Take 20 minutes before meals, Disp: , Rfl:     insulin detemir (Levemir FlexTouch) 100 Units/mL injection pen, Inject 45 Units under the skin , Disp: , Rfl:    insulin detemir (LEVEMIR) 100 units/mL subcutaneous injection, Inject 32 Units under the skin daily at bedtime (Patient taking differently: Inject 50 Units under the skin daily at bedtime ), Disp: , Rfl: 0    levothyroxine 137 mcg tablet, Take 137 mcg by mouth, Disp: , Rfl:     magnesium gluconate (MAGONATE) 500 MG tablet, 500 mg, Disp: , Rfl:     metoprolol tartrate (LOPRESSOR) 50 mg tablet, Take 1 tablet (50 mg total) by mouth every 12 (twelve) hours, Disp: 180 tablet, Rfl: 3    Multiple Vitamin (multivitamin) capsule, Take 1 capsule by mouth daily, Disp: , Rfl:     olmesartan (BENICAR) 40 mg tablet, TAKE 1 TABLET BY MOUTH EVERY DAY, Disp: 90 tablet, Rfl: 0    pantoprazole (PROTONIX) 20 mg tablet, Take 2 tablets (40 mg total) by mouth daily, Disp: 60 tablet, Rfl: 5    polyethylene glycol (MIRALAX) 17 g packet, Take 17 g by mouth daily, Disp: , Rfl:     pramipexole (MIRAPEX) 0 25 mg tablet, Take 1 tablet (0 25 mg total) by mouth daily, Disp: 90 tablet, Rfl: 3    Prucalopride Succinate 1 MG TABS, Take 1 mg by mouth daily, Disp: 30 tablet, Rfl: 5    QUEtiapine (SEROquel) 25 mg tablet, Take 1 tablet (25 mg total) by mouth daily at bedtime, Disp: 30 tablet, Rfl: 5    rosuvastatin (CRESTOR) 5 mg tablet, TAKE 1 TABLET BY MOUTH EVERY DAY AT NIGHT, Disp: , Rfl:     torsemide (DEMADEX) 20 mg tablet, TAKE 1 TABLET BY MOUTH EVERY DAY, Disp: 90 tablet, Rfl: 3    Vitamin D, Ergocalciferol, 2000 units CAPS, Take 2,000 Units by mouth daily , Disp: , Rfl:     bisacodyl (DULCOLAX) 5 mg EC tablet, Take 2 tablets (10 mg total) by mouth once for 1 dose, Disp: 2 tablet, Rfl: 0    polyethylene glycol (GOLYTELY) 4000 mL solution, Take 4,000 mL by mouth once for 1 dose (Patient not taking: Reported on 8/16/2021), Disp: 4000 mL, Rfl: 0    CONSTITUTIONAL:   Vitals:    09/03/21 1529   Temp: 98 3 °F (36 8 °C)   Weight: 78 kg (172 lb)   Height: 5' 3" (1 6 m)       Specific Alerts:    Have you been seen by a [facility] in the last 3 years? YES    Are you pregnant or planning to become pregnant? No    Are you currently or planning to be nursing or breast feeding? No    Allergies   Allergen Reactions    Ciprofloxacin Hives and Itching       May we call your Preferred Phone number to discuss your specific medical information? YES    May we leave a detailed message that includes your specific medical information? YES    Have you traveled outside of the [facility] in the past 3 months? No    Do you currently have a pacemaker or defibrillator? No    Do you have any artificial heart valves, joints, plates, screws, rods, stents, pins, etc? No   - If Yes, were any placed within the last 2 years? Do you require any medications prior to a surgical procedure? No   - If Yes, for which procedure? - If Yes, what medications to you require? Are you taking any medications that cause you to bleed more easily ("blood thinners") No    Have you ever experienced a rapid heartbeat with epinephrine? No    Have you ever been treated with "gold" (gold sodium thiomalate) therapy? No    [facility] can help with wrinkles, "laugh lines," facial volume loss, "double chin," "love handles," age spots, and more  Are you interested in learning today about some of the skin enhancement procedures that we offer? (If Yes, please provide more detail) No    Review of Systems:  Have you recently had or currently have any of the following?     · Fever or chills: No  · Night Sweats: No  · Headaches: No  · Weight Gain: No  · Weight Loss: No  · Blurry Vision: No  · Nausea: No  · Vomiting: No  · Diarrhea: No  · Blood in Stool: No  · Abdominal Pain: No  · Itchy Skin: No  · Painful Joints: No  · Swollen Joints: No  · Muscle Pain: No  · Irregular Mole: No  · Sun Burn: No  · Dry Skin: No  · Skin Color Changes: No  · Scar or Keloid: No  · Cold Sores/Fever Blisters: No  · Bacterial Infections/MRSA: No  · Anxiety: No  · Depression: No  · Suicidal or Homicidal Thoughts: No      PSYCH: Normal mood and affect  EYES: Normal conjunctiva  ENT: Normal lips and oral mucosa  CARDIOVASCULAR: No edema  RESPIRATORY: Normal respirations  HEME/LYMPH/IMMUNO:  No regional lymphadenopathy except as noted below in ASSESSMENT AND PLAN BY DIAGNOSIS    FULL ORGAN SYSTEM SKIN EXAM (SKIN)   Hair, Scalp, Ears, Face Normal except as noted below in Assessment     1  HISTORY OF BASAL CELL CARCINOMA    Physical Exam:   Anatomic Location Affected:  Left nasal tip, left cheek   Morphological Description of scar:  Well healed scar on cheek; 0 65 cm graft within normal limits, left tip/supra-tip   Suspected Recurrence: No      Additional History of Present Condition:  History of basal cell carcinoma with no sign of recurrence  Status post Mohs surgery 5/27/2021 on left nasal tip  Patient states the skin graft she had done swelled, she presented to the emergency department for it and was told it was ok  Dr Rita Zhu did a Kenalog injection in the area and use the Pulsed Eye Laser on the area  Assessment and Plan: The full-thickness graft site and the right-retroauricular donor site look great  Recommended follow-up with Dr Rita Zhu (upon her return)  Based on a thorough discussion of this condition and the management approach to it (including a comprehensive discussion of the known risks, side effects and potential benefits of treatment), the patient (family) agrees to implement the following specific plan:   Monitor for changes   Continue regular skin checks with Dermatology   Apply SPF 50 or higher multiple times a day  Wear sun protecting clothing and hats   Discussed treatment options such as laser treatments   It should improve gradually on it's own   Continue using scar treatment silicone patches if desired   Follow up with Dr Rita Zhu when she returns to the practice  How can basal cell carcinoma be prevented?   The most important way to prevent BCC is to avoid sunburn  This is especially important in childhood and early life  Fair skinned individuals and those with a personal or family history of BCC should protect their skin from sun exposure daily, year-round and lifelong   Stay indoors or under the shade in the middle of the day    Wear covering clothing    Apply high protection factor SPF50+ broad-spectrum sunscreens generously to exposed skin if outdoors    Avoid indoor tanning (sun beds, solaria)   Oral nicotinamide (vitamin B3) in a dose of 500 mg twice daily may reduce the number and severity of BCCs  What is the outlook for basal cell carcinoma? Most BCCs are cured by treatment  Cure is most likely if treatment is undertaken when the lesion is small  About 50% of people with BCC develop a second one within 3 years of the first  They are also at increased risk of other skin cancers, especially melanoma  Regular self-skin examinations and long-term annual skin checks by an experienced health professional are recommended  2  HISTORY OF SQUAMOUS CELL CARCINOMA     Physical Exam:   Anatomic Location Affected:  Left mid forehead, left cheek   Morphological Description of Scar:  Well healed scar on forehead and cheek   Suspected Recurrence: no   Regional adenopathy: no    Additional History of Present Condition:  Status post Mohs surgery, 5/20/2021, on forehead  Assessment and Plan:  Based on a thorough discussion of this condition and the management approach to it (including a comprehensive discussion of the known risks, side effects and potential benefits of treatment), the patient (family) agrees to implement the following specific plan:   Monitor for changes   Continue regular skin checks with Dermatology   Apply SPF 50 or higher multiple times a day  Wear sun protecting clothing and hats  How can SCC be prevented? The most important way to prevent SCC is to avoid sunburn   This is especially important in childhood and early life  Fair skinned individuals and those with a personal or family history of BCC should protect their skin from sun exposure daily, year-round and lifelong   Stay indoors or under the shade in the middle of the day    Wear covering clothing    Apply high protection factor SPF50+ broad-spectrum sunscreens generously to exposed skin if outdoors    Avoid indoor tanning (sun beds, solaria)      What is the outlook for SCC? Most SCC are cured by treatment  Cure is most likely if treatment is undertaken when the lesion is small  A small percent of SCC's can spread to lymph  nodes and long term monitoring is indicated  They are also at increased risk of other skin cancers, especially melanoma  Regular self-skin examinations and long-term annual skin checks by an experienced health professional are recommended                    Scribe Attestation    I,:  Driss Liz am acting as a scribe while in the presence of the attending physician :       I,:  Leonel Giles MD personally performed the services described in this documentation    as scribed in my presence :

## 2021-09-10 ENCOUNTER — OFFICE VISIT (OUTPATIENT)
Dept: NEPHROLOGY | Facility: CLINIC | Age: 71
End: 2021-09-10
Payer: MEDICARE

## 2021-09-10 VITALS — BODY MASS INDEX: 30.12 KG/M2 | WEIGHT: 170 LBS | HEIGHT: 63 IN

## 2021-09-10 DIAGNOSIS — N18.4 STAGE 4 CHRONIC KIDNEY DISEASE (HCC): ICD-10-CM

## 2021-09-10 DIAGNOSIS — D63.1 ANEMIA IN STAGE 4 CHRONIC KIDNEY DISEASE (HCC): ICD-10-CM

## 2021-09-10 DIAGNOSIS — I12.9 HYPERTENSIVE CHRONIC KIDNEY DISEASE WITH STAGE 1 THROUGH STAGE 4 CHRONIC KIDNEY DISEASE, OR UNSPECIFIED CHRONIC KIDNEY DISEASE: Primary | ICD-10-CM

## 2021-09-10 DIAGNOSIS — E78.5 DYSLIPIDEMIA: ICD-10-CM

## 2021-09-10 DIAGNOSIS — Z79.4 TYPE 2 DIABETES MELLITUS WITH CHRONIC KIDNEY DISEASE, WITH LONG-TERM CURRENT USE OF INSULIN, UNSPECIFIED CKD STAGE (HCC): ICD-10-CM

## 2021-09-10 DIAGNOSIS — E11.22 TYPE 2 DIABETES MELLITUS WITH CHRONIC KIDNEY DISEASE, WITH LONG-TERM CURRENT USE OF INSULIN, UNSPECIFIED CKD STAGE (HCC): ICD-10-CM

## 2021-09-10 DIAGNOSIS — R80.1 PERSISTENT PROTEINURIA: ICD-10-CM

## 2021-09-10 DIAGNOSIS — E55.9 VITAMIN D DEFICIENCY: ICD-10-CM

## 2021-09-10 DIAGNOSIS — N18.4 ANEMIA IN STAGE 4 CHRONIC KIDNEY DISEASE (HCC): ICD-10-CM

## 2021-09-10 PROCEDURE — 99214 OFFICE O/P EST MOD 30 MIN: CPT | Performed by: INTERNAL MEDICINE

## 2021-09-10 RX ORDER — HYDRALAZINE HYDROCHLORIDE 25 MG/1
25 TABLET, FILM COATED ORAL 2 TIMES DAILY
Qty: 60 TABLET | Refills: 5 | Status: SHIPPED | OUTPATIENT
Start: 2021-09-10 | End: 2022-03-02

## 2021-09-10 NOTE — PATIENT INSTRUCTIONS
1  Medication changes today:   Please begin hydralazine 25 mg twice a day for your blood pressure, if he noticed dizziness or lightheadedness with a low blood pressure please contact me right away   Please continue all other medications    2  PLEASE CONTACT DR SLAUGHTER THE CARDIOLOGIST IF YOU DO NOT HEAR FROM HER OFFICE BY MONDAY  IF YOU DEVELOPED ANY CHEST DISCOMFORT WHETHER PRESSURE OR SHARP PAIN OR ANY OTHER UNUSUAL SYMPTOMS SUCH AS SHORTNESS OF BREATH PLEASE GO RIGHT TO THE EMERGENCY ROOM! 3  Please take 1 week a blood pressure readings  2 weeks after making the above medication change or if you feel symptoms of lightheadedness or dizziness     AS FOLLOWS  MORNING AND EVENING, SITTING AND STANDING as follows:  · TAKE THE MORNING READINGS BEFORE ANY MEDICATIONS AND WHEN YOU ARE RELAXED FOR SEVERAL MINUTES  · TAKE THE EVENING READINGS:  BETWEEN 7-10 P M ; PRIOR TO ANY MEDICATIONS; AT LEAST IN OUR  FROM DINNER; AND CERTAINLY AFTER RELAXING FOR A FEW MINUTES  · PLEASE INCLUDE HEART RATE WITH YOUR BLOOD PRESSURE READINGS  · When taking standing readings, keep your arm supported at heart level and not dangling  · Make sure you are sitting with your back supported and feet on the ground and do not cross your legs or feet  · Make sure you have not taken any coffee or caffeine products or exercised or smoke cigarettes at least 30 minutes before taking your blood pressure  Then please mail these readings into the office    4  Follow-up in 4  months   Please bring in 1 week a blood pressure readings morning evening, sitting and standing is outlined above   Please go for fasting lab work 1-2 weeks prior to your appointment      5   General instructions:   AVOID SALT BUT NOT ADDING AN READING LABELS TO MAKE SURE THERE IS LOW-SALT IN THE FOOD THAT YOU ARE EATING  o Goal is less than 2 g of sodium intake or less than 5 g of sodium chloride intake per day     Avoid nonsteroidal anti-inflammatory drugs such as Naprosyn, ibuprofen, Aleve, Advil, Celebrex, Meloxicam (Mobic) etc   You can use Tylenol as needed if you do not have any liver condition to be concerned about     Avoid medications such as Sudafed or decongestants and antihistamines that contained the D component which is the decongestant  You can take antihistamines without the decongestant or D component   Try to avoid medications such as pantoprazole or  Protonix/Nexium or Esomeprazole)/Prilosec or omeprazole/Prevacid or lansoprazole/AcipHex or Rabeprazole  If you are able to, use Pepcid as this is safer for your kidneys   Try to exercise at least 30 minutes 3 days a week to begin with with an ultimate goal of 5 days a week for at least 30 minutes     Try to lose 5-10 lb by your next visit     Please do not drink more than 2 glasses of alcohol/wine on a daily basis as this may contribute to your high blood pressure

## 2021-09-10 NOTE — LETTER
September 10, 2021     MD Aga Stevens [address]  [address]  [facility]    Patient: Shefali Bridges   YOB: 1950   Date of Visit: 9/10/2021       Dear Dr Chante Weldon: Thank you for referring Selin Sargent to me for evaluation  Below are my notes for this consultation  If you have questions, please do not hesitate to call me  I look forward to following your patient along with you  Sincerely,        Laney Jeffrey MD        CC: Amrik Villaseñor MD  [facility] MD Laney Lund MD  9/10/2021  1:45 PM  Sign when Signing Visit  RENAL FOLLOW UP NOTE: td     ASSESSMENT AND PLAN:  1  [facility] :  · Etiology:  Diabetic nephropathy/hypertensive nephrosclerosis/arteriolar nephrosclerosis/chronic NSAID use   No evidence of obstructive uropathy, renal artery disease or primary glomerular disease with a bland urinalysis  Of note, CPK was normal at 105 no evidence rhabdomyolysis    · Baseline creatinine:  1 5-2 0  · Current creatinine:  1 73 at baseline  · Urine protein creatinine ratio:  0 87 g at goal!!  · UA demonstrated no significant hematuria but 3+ proteinuria from  · Serologies:  Normal C3/C4; negative rheumatoid factor; negative SPEP:  Negative UPEP;  Light chain ratio 2 09 essentially normal for chronic kidney disease  negative EWELINA; negative hepatitis-C; normal IgA; normal ASO; negative RPR  Recommendations:  · Treat hypertension-please see below  · Treat dyslipidemia-please see below  · Maintain proteinuria less than 1 g or as low as possible  · Avoid nephrotoxic agents such as NSAIDs, patient counseled as such     2   Volume:   · Current status:  Euvolemic  · Torsemide dose:  Continue current dose  3   Hypertension:  Workup:  · saline suppression test for primary aldosterone state was normal  · plasma free metanephrines was negative  · renal artery duplex was negative 02/2019       Current blood pressure averages:   A m :  143/75, no orthostatic changes  P m :  143/76, standing 131/74  Heart rate:  60-70 range        · Goal blood pressure:  less than 130/80 given less than 1 g of proteinuria at this time  Recommendations:  · Push nonmedical regimen including weight loss, isotonic exercise and avoidance of salt; patient counseled as such  · Medication changes today:   · Hydralazine 25 mg twice a day  · Recheck blood pressures in about 2 weeks  4   Electrolytes:  all acceptable including a potassium of 4 5: Please see below  5   Mineral bone disorder:  · Calcium/magnesium/phosphorus:  All acceptable, only borderline high magnesium at 2 3, avoid magnesium products  · PTH intact:  134 0 slightly elevated but will monitor for now and treat if vitamin-D is low  · Vitamin-D:  34 from August 30, 2021  6   Dyslipidemia:  · Goal LDL:  Less than 100  · Current lipid profile:  LDL 34/HDL 39/triglycerides 354 from 05/24/2020  Recommendations:  Per Endocrinology but essentially at goal  7   Anemia:   · Current hemoglobin  12 9 essentially normal  8   Other problems:  · Depression  · Diabetes mellitus per primary medical physician  · Hypothyroidism  · BARBARA on CPAP  · Fibromyalgia/osteoarthritis:  Patient with myoclonic movements, seems related to gabapentin it was adjusted with essential resolution    · Nephrolithiasis  · Basal cell/squamous CA of the skin  · Urinary stress incontinence  · Status post incisional hernia repairs  · Recent hospitalization as outlined below from severe GERD with hypoxemia and shortness of breath from a failed Nissen fundoplication from prior hiatal hernia repair  Woodrow Colon is due to have further testing with an EGD by GI and then subsequently thoracic surgery consultation for possible repair(however, according to the patient at this juncture she was felt I risk so no surgery is planned at this time)  In addition, she was placed on Protonix 40 b i d  and Pepcid 40 mg hs  · Hospitalization on 07/14/2020 secondary confusion at home   Apparently she had protracted hospital course in [place] following aspiration pneumonia   Ten days in the ICU on a ventilator   No overt infectious process   Low probability for pulmonary embolus despite an elevated D-dimer   Had mild leukocytosis/SIRS criteria subsequently discharged 1 day later when she clinically improved   Symptoms were felt secondary to her protracted ICU course  · Recent hospitalization with discharge on 05/18/21:  · Epigastric pain following an EGD on 05/13/2021 for Botox injection at the pylorus for treatment for gastroparesis  Apparently associated with chocolate ingestion and possible cough with aspiration  · Complicated by ANDRA with creatinine to 2 6 which improved with IV fluids TO 1 92 ON 05/18/2021       · Patient with chest pressure on 09/09/2021:  Discussed with Dr Kylee Black the patient's cardiologist   She recommended a close follow-up in the office which she will arrange; and also if the patient develops any symptoms until then to go right to the emergency room  Patient was instructed to do so in understands recommendations will also follow-up with Dr Kylee Black      GI health maintenance:  Last colonoscopy:  According to the patient less than 5 years ago; GI has seen her and is planning a colonoscopy Jennifer Maddox the GI PA)       PATIENT INSTRUCTIONS:    Patient Instructions   1  Medication changes today:   Please begin hydralazine 25 mg twice a day for your blood pressure, if he noticed dizziness or lightheadedness with a low blood pressure please contact me right away   Please continue all other medications    2  PLEASE CONTACT DR SLAUGHTER THE CARDIOLOGIST IF YOU DO NOT HEAR FROM HER OFFICE BY MONDAY  IF YOU DEVELOPED ANY CHEST DISCOMFORT WHETHER PRESSURE OR SHARP PAIN OR ANY OTHER UNUSUAL SYMPTOMS SUCH AS SHORTNESS OF BREATH PLEASE GO RIGHT TO THE EMERGENCY ROOM!       3  Please take 1 week a blood pressure readings  2 weeks after making the above medication change or if you feel symptoms of lightheadedness or dizziness     AS FOLLOWS  MORNING AND EVENING, SITTING AND STANDING as follows:  · TAKE THE MORNING READINGS BEFORE ANY MEDICATIONS AND WHEN YOU ARE RELAXED FOR SEVERAL MINUTES  · TAKE THE EVENING READINGS:  BETWEEN 7-10 P M ; PRIOR TO ANY MEDICATIONS; AT LEAST IN OUR  FROM DINNER; AND CERTAINLY AFTER RELAXING FOR A FEW MINUTES  · PLEASE INCLUDE HEART RATE WITH YOUR BLOOD PRESSURE READINGS  · When taking standing readings, keep your arm supported at heart level and not dangling  · Make sure you are sitting with your back supported and feet on the ground and do not cross your legs or feet  · Make sure you have not taken any coffee or caffeine products or exercised or smoke cigarettes at least 30 minutes before taking your blood pressure  Then please mail these readings into the office    4  Follow-up in 4  months   Please bring in 1 week a blood pressure readings morning evening, sitting and standing is outlined above   Please go for fasting lab work 1-2 weeks prior to your appointment      5  General instructions:   AVOID SALT BUT NOT ADDING AN READING LABELS TO MAKE SURE THERE IS LOW-SALT IN THE FOOD THAT YOU ARE EATING  o Goal is less than 2 g of sodium intake or less than 5 g of sodium chloride intake per day     Avoid nonsteroidal anti-inflammatory drugs such as Naprosyn, ibuprofen, Aleve, Advil, Celebrex, Meloxicam (Mobic) etc   You can use Tylenol as needed if you do not have any liver condition to be concerned about     Avoid medications such as Sudafed or decongestants and antihistamines that contained the D component which is the decongestant  You can take antihistamines without the decongestant or D component   Try to avoid medications such as pantoprazole or  Protonix/Nexium or Esomeprazole)/Prilosec or omeprazole/Prevacid or lansoprazole/AcipHex or Rabeprazole  If you are able to, use Pepcid as this is safer for your kidneys       Try to exercise at least 30 minutes 3 days a week to begin with with an ultimate goal of 5 days a week for at least 30 minutes     Try to lose 5-10 lb by your next visit     Please do not drink more than 2 glasses of alcohol/wine on a daily basis as this may contribute to your high blood pressure  Subjective:   Patient had COVID 19 infection the end of July or early August treated with antibodies with essential resolution of symptoms  Hospitalization in May please see above  No fevers, chills, or cough or colds  Fair to poor appetite and fair to poor energy  No change in weight  No hematuria, dysuria, voiding symptoms or foamy urine  No nausea vomiting, diarrhea which she did have with COVID has improved  Slight amount of abdominal cramps  She did have chest pressure yesterday lasted a couple hours, she was not doing anything it cleared on its own she did have a headache, no shortness of breath and did not go anywhere else  She does not follow with Cardiology on regular basis did see Dr Haroon martins about a 1/2 years ago  No leg swelling    Positive headaches, no dizziness or lightheadedness (she did month and half ago when her blood pressure was low)  Blood pressure medications:   Torsemide 20 mg daily morning   Olmesartan 40 mg daily in morning   Metoprolol tartrate 50 mg twice a day   Amlodipine 5 mg daily at dinner        ROS:  See HPI, otherwise review of systems as completely reviewed with the patient are negative    Past Medical History:   Diagnosis Date    Allergic     Anxiety     Arthritis     Aspiration into airway     Basal cell carcinoma 2007    left cheek     BCC (basal cell carcinoma) 05/27/2021    Left Nasal tip    Cancer (HCC)     squamous cell cancer on forhead    Cancer ([facility] [address] )     basal cell on nose     Chronic kidney disease 2000, 2018    Stones, kidney disease stage 4    COVID-19 08/2021    CPAP (continuous positive airway pressure) dependence     Diabetes mellitus ([facility] [address] )     Disease of thyroid gland  Family history of thyroid problem     GERD (gastroesophageal reflux disease)     Heart burn     Hyperlipidemia     Hyperplasia, parathyroid ([address] )     Hypertension     Kidney problem     Kidney stone     Obesity (BMI 30 0-34  9)     Pneumonia     RLS (restless legs syndrome)     SCC (squamous cell carcinoma) 05/04/2021    left mid forehead    Seasonal allergies     Sleep apnea     uses CPAP    Squamous cell skin cancer 2007    left cheek     Swollen ankles      Past Surgical History:   Procedure Laterality Date    ARTHROSCOPY KNEE      BREAST BIOPSY      stereotactic-benign    BREAST BIOPSY      stereo-benign    BREAST EXCISIONAL BIOPSY      unknown date-benign    BREAST EXCISIONAL BIOPSY      unknown date-benign    BREAST EXCISIONAL BIOPSY      unknown date-benign    BREAST EXCISIONAL BIOPSY      unknown age-benign    CHOLECYSTECTOMY      COLONOSCOPY      EXAMINATION UNDER ANESTHESIA N/A 6/24/2021    Procedure: EXAM UNDER ANESTHESIA (EUA), DISE;  Surgeon: Vi Rubalcava MD;  [facility]: [facility] MAIN OR;  Service: ENT    HERNIA REPAIR      HIATAL HERNIA REPAIR      LIPOSUCTION      MOHS SURGERY  05/20/2021    left mid forehead-Evan    MOHS SURGERY Left 05/27/2021    Left nasal tip- evan     REDUCTION MAMMAPLASTY Bilateral 2000    SKIN BIOPSY      SKIN CANCER EXCISION  2007    squamous cell carcinoma     SKIN CANCER EXCISION  2007    basal cell carcinoma    SQUAMOUS CELL CARCINOMA EXCISION      TOE SURGERY      TONSILLECTOMY      TUBAL LIGATION      UPPER GASTROINTESTINAL ENDOSCOPY       Family History   Problem Relation Age of Onset    Heart disease Mother     Depression Mother     Hypertension Mother    Violette George COPD Mother     Hearing loss Mother     Heart disease Father     Lung cancer Father 79        Smoker     Cancer Father         brain    Alcohol abuse Father     Dementia Father     Hypertension Father     Thyroid disease Father     COPD Father     Arthritis Father     Brain cancer Father 76    Hypertension Sister     Diabetes Sister     Heart disease Sister     Thyroid disease Sister     Cancer Sister         Lympoma    Lung cancer Sister 78    Hypertension Brother     Diabetes Brother     Cancer Brother         Throat    Dementia Brother     Stroke Brother     Hypertension Brother     No Known Problems Son     No Known Problems Son     Heart disease Brother     Diabetes Brother     Brain cancer Paternal Aunt         unknown age      reports that she quit smoking about 45 years ago  Her smoking use included cigarettes  She started smoking about 53 years ago  She has a 20 00 pack-year smoking history  She has never used smokeless tobacco  She reports previous alcohol use  She reports that she does not use drugs  I COMPLETELY REVIEWED THE PAST MEDICAL HISTORY/PAST SURGICAL HISTORY/SOCIAL HISTORY/FAMILY HISTORY/AND MEDICATIONS  AND UPDATED ALL    Objective:     Vitals:   BP sitting on right:  130/70 with a heart rate of 60 and regular same on left  BP standing on left:  124/68 with a heart rate of 68 and regular    Weight (last 2 days)     Date/Time   Weight    09/10/21 1249   77 1 (170)            Wt Readings from Last 3 Encounters:   09/10/21 77 1 kg (170 lb)   09/03/21 78 kg (172 lb)   08/19/21 80 3 kg (177 lb)       Body mass index is 30 11 kg/m²      Physical Exam: General:  No acute distress/obese  Skin:  No acute rash  Eyes:  No scleral icterus, noninjected, no discharge from eyes  ENT:  Moist mucous membranes  Neck:  Supple, no jugular venous distention, trachea is midline, no lymphadenopathy and no thyromegaly  Back   No CVAT  Chest:  Clear to auscultation and percussion, good respiratory effort  CVS:  Regular rate and rhythm without a rub, or gallops or murmurs  Abdomen:  Obese,Soft and nontender with normal bowel sounds  Extremities:  No cyanosis and no edema, no arthritic changes, normal range of motion  Neuro:  Grossly intact  Psych: Alert, oriented x3 and appropriate      Medications:    Current Outpatient Medications:     acetaminophen (TYLENOL) 500 mg tablet, Take 1,000 mg by mouth as needed , Disp: , Rfl:     albuterol (Ventolin HFA) 90 mcg/act inhaler, Inhale 2 puffs every 6 (six) hours as needed for wheezing or shortness of breath, Disp: , Rfl: 0    amLODIPine (NORVASC) 5 mg tablet, TAKE 1 TABLET BY MOUTH EVERY DAY, Disp: 90 tablet, Rfl: 3    b complex vitamins tablet, Take 1 tablet by mouth daily , Disp: , Rfl:     B-D ULTRAFINE III SHORT PEN 31G X 8 MM MISC, USE AS DIRECTED 4 TIMES PER DAY, Disp: , Rfl: 3    bisacodyl (DULCOLAX) 5 mg EC tablet, Take 2 tablets (10 mg total) by mouth once for 1 dose, Disp: 2 tablet, Rfl: 0    DULoxetine (Cymbalta) 30 mg delayed release capsule, Take 30 mg by mouth daily, Disp: , Rfl:     DULoxetine (CYMBALTA) 60 mg delayed release capsule, Take 90 mg by mouth daily, Disp: , Rfl:     famotidine (PEPCID) 40 MG tablet, Take 1 tablet (40 mg total) by mouth daily Take daily in am, Disp: 90 tablet, Rfl: 3    fluticasone (FLONASE) 50 mcg/act nasal spray, 1-2 sprays daily , Disp: , Rfl:     gabapentin (NEURONTIN) 300 mg capsule, Take 300 mg by mouth daily , Disp: , Rfl:     Icosapent Ethyl (Vascepa) 1 g CAPS, Take 1 g by mouth 2 (two) times a day, Disp: , Rfl:     insulin aspart (NovoLOG FlexPen) 100 UNIT/ML injection pen, Inject 18-24 Units under the skin , Disp: , Rfl:     insulin aspart (NovoLOG) 100 units/mL injection, Inject under the skin 3 (three) times a day before meals 14 units, 14 units, 18 units  , Disp: , Rfl:     insulin aspart, w/niacinamide, (FIASP) 100 Units/mL injection pen, Inject 14 units at breakfast and lunch then 18 units at dinner  Take 4 extra units when on steroid   Take 20 minutes before meals, Disp: , Rfl:     insulin detemir (Levemir FlexTouch) 100 Units/mL injection pen, Inject 45 Units under the skin , Disp: , Rfl:     insulin detemir (LEVEMIR) 100 units/mL subcutaneous injection, Inject 32 Units under the skin daily at bedtime (Patient taking differently: Inject 50 Units under the skin daily at bedtime ), Disp: , Rfl: 0    levothyroxine 137 mcg tablet, Take 137 mcg by mouth, Disp: , Rfl:     metoprolol tartrate (LOPRESSOR) 50 mg tablet, Take 1 tablet (50 mg total) by mouth every 12 (twelve) hours, Disp: 180 tablet, Rfl: 3    Multiple Vitamin (multivitamin) capsule, Take 1 capsule by mouth daily, Disp: , Rfl:     olmesartan (BENICAR) 40 mg tablet, TAKE 1 TABLET BY MOUTH EVERY DAY, Disp: 90 tablet, Rfl: 0    polyethylene glycol (MIRALAX) 17 g packet, Take 17 g by mouth daily, Disp: , Rfl:     pramipexole (MIRAPEX) 0 25 mg tablet, Take 1 tablet (0 25 mg total) by mouth daily, Disp: 90 tablet, Rfl: 3    QUEtiapine (SEROquel) 25 mg tablet, Take 1 tablet (25 mg total) by mouth daily at bedtime, Disp: 30 tablet, Rfl: 5    rosuvastatin (CRESTOR) 5 mg tablet, TAKE 1 TABLET BY MOUTH EVERY DAY AT NIGHT, Disp: , Rfl:     torsemide (DEMADEX) 20 mg tablet, TAKE 1 TABLET BY MOUTH EVERY DAY, Disp: 90 tablet, Rfl: 3    Vitamin D, Ergocalciferol, 2000 units CAPS, Take 2,000 Units by mouth daily , Disp: , Rfl:     hydrALAZINE (APRESOLINE) 25 mg tablet, Take 1 tablet (25 mg total) by mouth 2 (two) times a day, Disp: 60 tablet, Rfl: 5    pantoprazole (PROTONIX) 20 mg tablet, Take 2 tablets (40 mg total) by mouth daily (Patient not taking: Reported on 9/10/2021), Disp: 60 tablet, Rfl: 5    Prucalopride Succinate 1 MG TABS, Take 1 mg by mouth daily (Patient not taking: Reported on 9/10/2021), Disp: 30 tablet, Rfl: 5    Lab, Imaging and other studies: I have personally reviewed pertinent labs    Laboratory Results:  Results for orders placed or performed in visit on 08/30/21   Protein / creatinine ratio, urine   Result Value Ref Range    PROTEIN UA 67 5     EXT Creatinine Urine 77 4     EXTERNAL Ur Prot/Creat Ratio 0 87              Invalid input(s): ALBUMIN      Radiology review:   chest X-ray    Ultrasound      Portions of the record may have been created with voice recognition software  Occasional wrong word or "sound a like" substitutions may have occurred due to the inherent limitations of voice recognition software  Read the chart carefully and recognize, using context, where substitutions have occurred

## 2021-09-13 ENCOUNTER — OFFICE VISIT (OUTPATIENT)
Dept: CARDIOLOGY CLINIC | Facility: CLINIC | Age: 71
End: 2021-09-13
Payer: MEDICARE

## 2021-09-13 VITALS
HEIGHT: 63 IN | WEIGHT: 171.6 LBS | OXYGEN SATURATION: 96 % | SYSTOLIC BLOOD PRESSURE: 128 MMHG | HEART RATE: 67 BPM | BODY MASS INDEX: 30.41 KG/M2 | DIASTOLIC BLOOD PRESSURE: 76 MMHG

## 2021-09-13 DIAGNOSIS — I10 ESSENTIAL HYPERTENSION: Primary | ICD-10-CM

## 2021-09-13 DIAGNOSIS — R07.89 OTHER CHEST PAIN: ICD-10-CM

## 2021-09-13 DIAGNOSIS — E78.5 DYSLIPIDEMIA: ICD-10-CM

## 2021-09-13 PROCEDURE — 99214 OFFICE O/P EST MOD 30 MIN: CPT | Performed by: INTERNAL MEDICINE

## 2021-09-13 NOTE — PROGRESS NOTES
Cardiology Consultation     Arash Carlos  [id number]  1950  [address] [facility] [place] [address]  [id number]    1  Essential hypertension  NM myocardial perfusion spect (stress and/or rest)   2  Dyslipidemia  NM myocardial perfusion spect (stress and/or rest)   3  Other chest pain  NM myocardial perfusion spect (stress and/or rest)     Chief Complaint   Patient presents with    Follow-up     this morning heart was pounding     HPI: Patient was recently seen at nephrologist's office and reported progressive chest discomfort  Pain was located in mid chest and lasted a couple hours, came on at rest, no radiation, mo associated shortness of breath, diaphoresis, nausea  She woke up this morning with chest pounding sensation  No reported palpitations, lightheadedness, syncope, LE edema, orthopnea, PND, or significant weight changes        Patient Active Problem List   Diagnosis    Hypertensive chronic kidney disease with stage 1 through stage 4 chronic kidney disease, or unspecified chronic kidney disease    Obstructive sleep apnea    RLS (restless legs syndrome)    Persistent proteinuria    Type 2 diabetes mellitus with diabetic nephropathy, with long-term current use of insulin ([facility])    Pulmonary hypertension ([address] )    Dyspnea    History of repair of hiatal hernia    Morbid (severe) obesity due to excess calories ([address] )    Dyslipidemia    Vitamin D deficiency    Anemia of chronic renal failure, stage 3 (moderate) (HCC)    Fibromyalgia    Hallucinations    Type 2 diabetes mellitus with diabetic chronic kidney disease (HCC)    GERD (gastroesophageal reflux disease)    Altered mental status    Benign hypertension with CKD (chronic kidney disease) stage III    History of kidney stones    Urinary urgency    Essential hypertension    Ambulatory dysfunction    Memory changes    Gastroparesis diabeticorum (HCC)    B12 neuropathy (HCC)    Depression, recurrent ([place] [address] )    Chronic respiratory failure with hypoxia (HCC)    Stage 4 chronic kidney disease (HCC)    Anemia in stage 4 chronic [facility] disease [facility] )    Epigastric abdominal pain with severe diabetic gastroparesis, status post EGD/Botox injection, 5/14    Cough    COPD (chronic obstructive pulmonary disease) (HCC)    BMI 29 0-29 9,adult    Chronic constipation    History of colon polyps    COVID-19 virus infection    Other chest pain     Past Medical History:   Diagnosis Date    Allergic     Anxiety     Arthritis     Aspiration into airway     Basal cell carcinoma 2007    left cheek     BCC (basal cell carcinoma) 05/27/2021    Left Nasal tip    Cancer (HCC)     squamous cell cancer on forhead    Cancer ([place] [address] )     basal cell on nose     Chronic kidney disease 2000, 2018    Stones, kidney disease stage 4    COVID-19 08/2021    CPAP (continuous positive airway pressure) dependence     Diabetes mellitus ([place] [address] )     Disease of thyroid gland     Family history of thyroid problem     GERD (gastroesophageal reflux disease)     Heart burn     Hyperlipidemia     Hyperplasia, parathyroid ([place] [address] )     Hypertension     Kidney problem     Kidney stone     Obesity (BMI 30 0-34  9)     Pneumonia     RLS (restless legs syndrome)     SCC (squamous cell carcinoma) 05/04/2021    left mid forehead    Seasonal allergies     Sleep apnea     uses CPAP    Squamous cell skin cancer 2007    left cheek     Swollen ankles      Social History     Socioeconomic History    Marital status: /Civil Union     Spouse name: Not on file    Number of children: Not on file    Years of education: Not on file    Highest education level: Not on file   Occupational History    Occupation: RETIRED   Tobacco Use    Smoking status: Former Smoker     Packs/day: 2 00     Years: 10 00     Pack years: 20 00     Types: Cigarettes     Start date: 11/1/1967 Quit date: 12     Years since quittin 7    Smokeless tobacco: Never Used   Vaping Use    Vaping Use: Never used   Substance and Sexual Activity    Alcohol use: Not Currently     Alcohol/week: 0 0 standard drinks    Drug use: No    Sexual activity: Not Currently     Partners: Male     Birth control/protection: Abstinence, Post-menopausal, Female Sterilization   Other Topics Concern    Not on file   Social History Narrative    Not on file     Social Determinants of Health     Financial Resource Strain:     Difficulty of Paying Living Expenses:    Food Insecurity:     Worried About Running Out of Food in the Last Year:     Ran Out of Food in the Last Year:    Transportation Needs:     Lack of Transportation (Medical):      Lack of Transportation (Non-Medical):    Physical Activity:     Days of Exercise per Week:     Minutes of Exercise per Session:    Stress:     Feeling of Stress :    Social Connections:     Frequency of Communication with Friends and Family:     Frequency of Social Gatherings with Friends and Family:     Attends Buddhism Services:     Active Member of Clubs or Organizations:     Attends Club or Organization Meetings:     Marital Status:    Intimate Partner Violence:     Fear of Current or Ex-Partner:     Emotionally Abused:     Physically Abused:     Sexually Abused:       Family History   Problem Relation Age of Onset    Heart disease Mother     Depression Mother     Hypertension Mother    Estefani Nava COPD Mother     Hearing loss Mother     Heart disease Father     Lung cancer Father 79        Smoker     Cancer Father         brain    Alcohol abuse Father     Dementia Father     Hypertension Father     Thyroid disease Father     COPD Father     Arthritis Father     Brain cancer Father 76    Hypertension Sister     Diabetes Sister     Heart disease Sister     Thyroid disease Sister     Cancer Sister         Lympoma    Lung cancer Sister 78    Hypertension Brother     Diabetes Brother     Cancer Brother         Throat    Dementia Brother     Stroke Brother     Hypertension Brother     No Known Problems Son     No Known Problems Son     Heart disease Brother     Diabetes Brother     Brain cancer Paternal Aunt         unknown age     Past Surgical History:   Procedure Laterality Date    ARTHROSCOPY KNEE      BREAST BIOPSY      stereotactic-benign    BREAST BIOPSY      stereo-benign    BREAST EXCISIONAL BIOPSY      unknown date-benign    BREAST EXCISIONAL BIOPSY      unknown date-benign    BREAST EXCISIONAL BIOPSY      unknown date-benign    BREAST EXCISIONAL BIOPSY      unknown age-benign    CHOLECYSTECTOMY      COLONOSCOPY      EXAMINATION UNDER ANESTHESIA N/A 6/24/2021    Procedure: EXAM UNDER ANESTHESIA (EUA), DISE;  Surgeon: Eros Fried MD;  [facility];  Service: ENT    HERNIA REPAIR      HIATAL HERNIA REPAIR      LIPOSUCTION      MOHS SURGERY  05/20/2021    left mid forehead-Gautam    MOHS SURGERY Left 05/27/2021    Left nasal tip- gautam     REDUCTION MAMMAPLASTY Bilateral 2000    SKIN BIOPSY      SKIN CANCER EXCISION  2007    squamous cell carcinoma     SKIN CANCER EXCISION  2007    basal cell carcinoma    SQUAMOUS CELL CARCINOMA EXCISION      TOE SURGERY      TONSILLECTOMY      TUBAL LIGATION      UPPER GASTROINTESTINAL ENDOSCOPY         Current Outpatient Medications:     acetaminophen (TYLENOL) 500 mg tablet, Take 1,000 mg by mouth as needed , Disp: , Rfl:     albuterol (Ventolin HFA) 90 mcg/act inhaler, Inhale 2 puffs every 6 (six) hours as needed for wheezing or shortness of breath, Disp: , Rfl: 0    amLODIPine (NORVASC) 5 mg tablet, TAKE 1 TABLET BY MOUTH EVERY DAY, Disp: 90 tablet, Rfl: 3    b complex vitamins tablet, Take 1 tablet by mouth daily , Disp: , Rfl:     B-D ULTRAFINE III SHORT PEN 31G X 8 MM MISC, USE AS DIRECTED 4 TIMES PER DAY, Disp: , Rfl: 3    DULoxetine (Cymbalta) 30 mg delayed release capsule, Take 30 mg by mouth daily, Disp: , Rfl:     DULoxetine (CYMBALTA) 60 mg delayed release capsule, Take 90 mg by mouth daily, Disp: , Rfl:     famotidine (PEPCID) 40 MG tablet, Take 1 tablet (40 mg total) by mouth daily Take daily in am, Disp: 90 tablet, Rfl: 3    fluticasone (FLONASE) 50 mcg/act nasal spray, 1-2 sprays daily , Disp: , Rfl:     gabapentin (NEURONTIN) 300 mg capsule, Take 300 mg by mouth daily , Disp: , Rfl:     hydrALAZINE (APRESOLINE) 25 mg tablet, Take 1 tablet (25 mg total) by mouth 2 (two) times a day, Disp: 60 tablet, Rfl: 5    Icosapent Ethyl (Vascepa) 1 g CAPS, Take 1 g by mouth 2 (two) times a day, Disp: , Rfl:     insulin aspart (NovoLOG FlexPen) 100 UNIT/ML injection pen, Inject 18-24 Units under the skin , Disp: , Rfl:     insulin aspart (NovoLOG) 100 units/mL injection, Inject under the skin 3 (three) times a day before meals 14 units, 14 units, 18 units  , Disp: , Rfl:     insulin aspart, w/niacinamide, (FIASP) 100 Units/mL injection pen, Inject 14 units at breakfast and lunch then 18 units at dinner  Take 4 extra units when on steroid   Take 20 minutes before meals, Disp: , Rfl:     insulin detemir (Levemir FlexTouch) 100 Units/mL injection pen, Inject 45 Units under the skin , Disp: , Rfl:     insulin detemir (LEVEMIR) 100 units/mL subcutaneous injection, Inject 32 Units under the skin daily at bedtime (Patient taking differently: Inject 50 Units under the skin daily at bedtime ), Disp: , Rfl: 0    levothyroxine 137 mcg tablet, Take 137 mcg by mouth, Disp: , Rfl:     metoprolol tartrate (LOPRESSOR) 50 mg tablet, Take 1 tablet (50 mg total) by mouth every 12 (twelve) hours, Disp: 180 tablet, Rfl: 3    Multiple Vitamin (multivitamin) capsule, Take 1 capsule by mouth daily, Disp: , Rfl:     olmesartan (BENICAR) 40 mg tablet, TAKE 1 TABLET BY MOUTH EVERY DAY, Disp: 90 tablet, Rfl: 0    polyethylene glycol (MIRALAX) 17 g packet, Take 17 g by mouth daily, Disp: , Rfl:     pramipexole (MIRAPEX) 0 25 mg tablet, Take 1 tablet (0 25 mg total) by mouth daily, Disp: 90 tablet, Rfl: 3    QUEtiapine (SEROquel) 25 mg tablet, Take 1 tablet (25 mg total) by mouth daily at bedtime, Disp: 30 tablet, Rfl: 5    rosuvastatin (CRESTOR) 5 mg tablet, TAKE 1 TABLET BY MOUTH EVERY DAY AT NIGHT, Disp: , Rfl:     torsemide (DEMADEX) 20 mg tablet, TAKE 1 TABLET BY MOUTH EVERY DAY, Disp: 90 tablet, Rfl: 3    Vitamin D, Ergocalciferol, 2000 units CAPS, Take 2,000 Units by mouth daily , Disp: , Rfl:     bisacodyl (DULCOLAX) 5 mg EC tablet, Take 2 tablets (10 mg total) by mouth once for 1 dose (Patient not taking: Reported on 9/13/2021), Disp: 2 tablet, Rfl: 0    pantoprazole (PROTONIX) 20 mg tablet, Take 2 tablets (40 mg total) by mouth daily (Patient not taking: Reported on 9/10/2021), Disp: 60 tablet, Rfl: 5    Prucalopride Succinate 1 MG TABS, Take 1 mg by mouth daily (Patient not taking: Reported on 9/10/2021), Disp: 30 tablet, Rfl: 5  Allergies   Allergen Reactions    Ciprofloxacin Hives and Itching     Vitals:    09/13/21 0927   BP: 128/76   BP Location: Left arm   Patient Position: Sitting   Cuff Size: Standard   Pulse: 67   SpO2: 96%   Weight: 77 8 kg (171 lb 9 6 oz)   Height: 5' 3" (1 6 m)       Labs:  Orders Only on 08/30/2021   Component Date Value    PROTEIN UA 08/30/2021 67 5     EXT Creatinine Urine 08/30/2021 77 4     EXTERNAL Ur Prot/Creat R* 08/30/2021 0 87    Office Visit on 07/02/2021   Component Date Value    LEUKOCYTE ESTERASE,UA 07/02/2021 moderate     NITRITE,UA 07/02/2021 negative     SL AMB POCT UROBILINOGEN 07/02/2021 0 2     POCT URINE PROTEIN 07/02/2021 negative      PH,UA 07/02/2021 5 0     BLOOD,UA 07/02/2021 negative     SPECIFIC GRAVITY,UA 07/02/2021 1 005     KETONES,UA 07/02/2021 negative     BILIRUBIN,UA 07/02/2021 negative     GLUCOSE, UA 07/02/2021 negative      COLOR,UA 07/02/2021 yellow     CLARITY,UA 07/02/2021 clear    Office Visit on 06/28/2021   Component Date Value     COLOR,UA 06/28/2021 Yellow     CLARITY,UA 06/28/2021 Clear     SPECIFIC GRAVITY,UA 06/28/2021 1 010      PH,UA 06/28/2021 6 5     LEUKOCYTE ESTERASE,UA 06/28/2021 2+     NITRITE,UA 06/28/2021 neg     GLUCOSE, UA 06/28/2021 neg     KETONES,UA 06/28/2021 neg     BILIRUBIN,UA 06/28/2021 neg     BLOOD,UA 06/28/2021 neg     POCT URINE PROTEIN 06/28/2021 15     SL AMB POCT UROBILINOGEN 06/28/2021 0 2     Clarity, UA 06/28/2021 Clear     Color, UA 06/28/2021 Yellow     Specific Gravity, UA 06/28/2021 1 006     pH, UA 06/28/2021 6 5     Glucose, UA 06/28/2021 Negative     Ketones, UA 06/28/2021 Negative     Blood, UA 06/28/2021 Negative     Protein, UA 06/28/2021 Negative     Nitrite, UA 06/28/2021 Negative     Bilirubin, UA 06/28/2021 Negative     Urobilinogen, UA 06/28/2021 0 2     Leukocytes, UA 06/28/2021 Moderate*    WBC, UA 06/28/2021 10-20*    RBC, UA 06/28/2021 None Seen     Hyaline Casts, UA 06/28/2021 None Seen     Bacteria, UA 06/28/2021 None Seen     Epithelial Cells 06/28/2021 None Seen     Urine Culture 06/28/2021 10,000-19,000 cfu/ml     Admission on 06/24/2021, Discharged on 06/24/2021   Component Date Value    POC Glucose 06/24/2021 140    Telephone on 06/08/2021   Component Date Value    Right Eye Diabetic Retin* 09/25/2020 None     Left Eye Diabetic Retino* 09/25/2020 None    Orders Only on 05/24/2021   Component Date Value    EXT Creatinine Urine 05/24/2021 71 4     Microalbum  ,U,Random 05/24/2021 29 3     EXTERNAL Microalb/Creat * 05/24/2021 410 4     Hemoglobin A1C 05/24/2021 6 8    Admission on 05/22/2021, Discharged on 05/22/2021   Component Date Value    Color, UA 05/22/2021 Yellow     Clarity, UA 05/22/2021 Clear     pH, UA 05/22/2021 7 0     Leukocytes, UA 05/22/2021 Small*    Nitrite, UA 05/22/2021 Negative     Protein, UA 05/22/2021 100 (2+)*    Glucose, UA 05/22/2021 Negative     Ketones, UA 05/22/2021 Negative  Urobilinogen, UA 05/22/2021 1 0     Bilirubin, UA 05/22/2021 Negative     Blood, UA 05/22/2021 Negative     Specific Gravity, UA 05/22/2021 1 015     RBC, UA 05/22/2021 0-1     WBC, UA 05/22/2021 4-10*    Epithelial Cells 05/22/2021 Occasional     Bacteria, UA 05/22/2021 Occasional     WBC 05/22/2021 8 06     RBC 05/22/2021 4 28     Hemoglobin 05/22/2021 11 9     Hematocrit 05/22/2021 36 5     MCV 05/22/2021 85     MCH 05/22/2021 27 8     MCHC 05/22/2021 32 6     RDW 05/22/2021 14 9     MPV 05/22/2021 11 2     Platelets 66/62/3672 241     nRBC 05/22/2021 0     Neutrophils Relative 05/22/2021 65     Immat GRANS % 05/22/2021 0     Lymphocytes Relative 05/22/2021 24     Monocytes Relative 05/22/2021 7     Eosinophils Relative 05/22/2021 4     Basophils Relative 05/22/2021 0     Neutrophils Absolute 05/22/2021 5 19     Immature Grans Absolute 05/22/2021 0 01     Lymphocytes Absolute 05/22/2021 1 96     Monocytes Absolute 05/22/2021 0 56     Eosinophils Absolute 05/22/2021 0 31     Basophils Absolute 05/22/2021 0 03     Sodium 05/22/2021 143     Potassium 05/22/2021 4 9     Chloride 05/22/2021 101     CO2 05/22/2021 31     ANION GAP 05/22/2021 11     BUN 05/22/2021 36*    Creatinine 05/22/2021 1 79*    Glucose 05/22/2021 153*    Calcium 05/22/2021 9 1     Corrected Calcium 05/22/2021 9 9     AST 05/22/2021 40     ALT 05/22/2021 35     Alkaline Phosphatase 05/22/2021 94     Total Protein 05/22/2021 7 1     Albumin 05/22/2021 3 0*    Total Bilirubin 05/22/2021 0 42     eGFR 05/22/2021 28    Admission on 05/17/2021, Discharged on 05/18/2021   Component Date Value    WBC 05/17/2021 7 70     RBC 05/17/2021 4 44     Hemoglobin 05/17/2021 12 1     Hematocrit 05/17/2021 38 8     MCV 05/17/2021 87     MCH 05/17/2021 27 3     MCHC 05/17/2021 31 2*    RDW 05/17/2021 14 9     MPV 05/17/2021 11 1     Platelets 48/67/6402 259     nRBC 05/17/2021 0     Neutrophils Relative 05/17/2021 64     Immat GRANS % 05/17/2021 0     Lymphocytes Relative 05/17/2021 25     Monocytes Relative 05/17/2021 7     Eosinophils Relative 05/17/2021 4     Basophils Relative 05/17/2021 0     Neutrophils Absolute 05/17/2021 4 94     Immature Grans Absolute 05/17/2021 0 02     Lymphocytes Absolute 05/17/2021 1 90     Monocytes Absolute 05/17/2021 0 54     Eosinophils Absolute 05/17/2021 0 27     Basophils Absolute 05/17/2021 0 03     Sodium 05/17/2021 141     Potassium 05/17/2021 3 9     Chloride 05/17/2021 101     CO2 05/17/2021 30     ANION GAP 05/17/2021 10     BUN 05/17/2021 54*    Creatinine 05/17/2021 2 66*    Glucose 05/17/2021 133     Calcium 05/17/2021 8 6     AST 05/17/2021 29     ALT 05/17/2021 31     Alkaline Phosphatase 05/17/2021 94     Total Protein 05/17/2021 7 6     Albumin 05/17/2021 3 9     Total Bilirubin 05/17/2021 0 26     eGFR 05/17/2021 17     Lipase 05/17/2021 35*    Protime 05/17/2021 12 6     INR 05/17/2021 0 94     PTT 05/17/2021 26     LACTIC ACID 05/17/2021 1 7     Blood Culture 05/17/2021 No Growth After 5 Days   Blood Culture 05/17/2021 No Growth After 5 Days       Color, UA 05/17/2021 Yellow     Clarity, UA 05/17/2021 Clear     pH, UA 05/17/2021 5 5     Leukocytes, UA 05/17/2021 Small*    Nitrite, UA 05/17/2021 Negative     Protein, UA 05/17/2021 100 (2+)*    Glucose, UA 05/17/2021 Negative     Ketones, UA 05/17/2021 Negative     Urobilinogen, UA 05/17/2021 0 2     Bilirubin, UA 05/17/2021 Negative     Blood, UA 05/17/2021 Negative     Specific Gravity, UA 05/17/2021 1 015     RBC, UA 05/17/2021 None Seen     WBC, UA 05/17/2021 4-10*    Epithelial Cells 05/17/2021 Occasional     Bacteria, UA 05/17/2021 Moderate*    Urine Culture 05/17/2021 >100,000 cfu/ml Enterobacter cloacae complex*    Platelets 56/06/6342 207     MPV 05/18/2021 10 7     POC Glucose 05/17/2021 196*    POC Glucose 05/17/2021 118     Hemoglobin A1C 05/17/2021 6 6*    EAG 05/17/2021 143     POC Glucose 05/17/2021 115     POC Glucose 05/17/2021 194*    Sodium 05/18/2021 141     Potassium 05/18/2021 4 1     Chloride 05/18/2021 104     CO2 05/18/2021 28     ANION GAP 05/18/2021 9     BUN 05/18/2021 45*    Creatinine 05/18/2021 1 92*    Glucose 05/18/2021 159*    Glucose, Fasting 05/18/2021 159*    Calcium 05/18/2021 8 3     eGFR 05/18/2021 26     POC Glucose 05/18/2021 148*    POC Glucose 05/18/2021 171*    Ventricular Rate 05/17/2021 64     Atrial Rate 05/17/2021 64     LA Interval 05/17/2021 248     QRSD Interval 05/17/2021 94     QT Interval 05/17/2021 396     QTC Interval 05/17/2021 408     P Axis 05/17/2021 73     QRS Axis 05/17/2021 -6     T Wave Axis 05/17/2021 85    Consult on 05/04/2021   Component Date Value    Case Report 05/04/2021                      Value:Surgical Pathology Report                         Case: [id number]                                   Authorizing Provider:  Jose Natarajan MD           Collected:           05/04/2021 1544              Ordering Location:     [facility]      Received:            05/04/2021 [address]                                                                       Pathologist:           Abebe Velazquez MD                                                           Specimens:   A) - Skin, Other, Left Nasal tip                                                                    B) - Skin, Other, Left mid forehead                                                        Final Diagnosis 05/04/2021                      Value: This result contains rich text formatting which cannot be displayed here   Additional Information 05/04/2021                      Value: This result contains rich text formatting which cannot be displayed here  [facility] Description 05/04/2021                      Value: This result contains rich text formatting which cannot be displayed here   Clinical Information 05/04/2021                      Value:Specimen A; Left Nasal tip; skin; shave biopsy; 67 yo female with 0 5 cm erythematous papule; diffdx; rule out basal cell carcinoma  Specimen B; Left Mid forehead; skin; shave biopsy;70 yo female with  0 6 cm scaly erythematous thin papule; diffdx; actinic keratosis versus squamous cell carcinoma  Attention Dr Kady Wall   Orders Only on 04/02/2021   Component Date Value    PROTEIN UA 04/02/2021 59 6     EXT Creatinine Urine 04/02/2021 89 2     EXTERNAL Ur Prot/Creat R* 04/02/2021 0 67    There may be more visits with results that are not included  No results found for: CHOL, TRIG, HDL, LDLDIRECT  Imaging: No results found  Review of Systems:  Review of Systems   Constitutional: Negative for activity change, appetite change, chills, diaphoresis, fatigue and unexpected weight change  HENT: Negative for hearing loss, nosebleeds and sore throat  Eyes: Negative for photophobia and visual disturbance  Respiratory: Negative for cough, chest tightness, shortness of breath and wheezing  Cardiovascular: Positive for chest pain  Negative for palpitations and leg swelling  Gastrointestinal: Negative for abdominal pain, diarrhea, nausea and vomiting  Endocrine: Negative for polyuria  Genitourinary: Negative for dysuria, frequency and hematuria  Musculoskeletal: Negative for arthralgias, back pain, gait problem and neck pain  Skin: Negative for pallor and rash  Neurological: Negative for dizziness, syncope and headaches  Hematological: Does not bruise/bleed easily  Psychiatric/Behavioral: Negative for behavioral problems and confusion  Physical Exam:  Physical Exam  Vitals reviewed  Constitutional:       Appearance: She is well-developed  She is not diaphoretic  HENT:      Head: Normocephalic and atraumatic  Nose: Nose normal    Eyes:      General: No scleral icterus       Pupils: Pupils are equal, round, and reactive to light  Neck:      Vascular: No JVD  Cardiovascular:      Rate and Rhythm: Normal rate and regular rhythm  Heart sounds: Normal heart sounds  No murmur heard  No friction rub  No gallop  Pulmonary:      Effort: Pulmonary effort is normal  No respiratory distress  Breath sounds: Normal breath sounds  No wheezing or rales  Abdominal:      General: Bowel sounds are normal  There is no distension  Palpations: Abdomen is soft  Tenderness: There is no abdominal tenderness  Musculoskeletal:         General: No deformity  Normal range of motion  Cervical back: Normal range of motion and neck supple  Skin:     General: Skin is warm and dry  Findings: No rash  Neurological:      Mental Status: She is alert and oriented to person, place, and time  Cranial Nerves: No cranial nerve deficit  Psychiatric:         Behavior: Behavior normal        Blood pressure 128/76, pulse 67, height 5' 3" (1 6 m), weight 77 8 kg (171 lb 9 6 oz), SpO2 96 %  Discussion/Summary:  Chest pain:  She does have risk factors of age, HTN, HLD, post-menopausal, family history of CAD, former smoker and had a stress test in June 2018  Will recheck stress testing now with new symptoms  Pulm HTN: PASP noted to be 31 mmHg in June 2018 - which is not in the range of pulm HTN, so unlikely to be contributing to symptoms  HTN: continued on amlodipine, well controlled today - with increase to 5mg twice daily - is being managed by Dr Deedee Raymundo from nephrology  HLD: continued on zetia and statin with an LDL of 6 in May 2019  TG are 307 - which will not be helped by the statin or zetia  We stopped both and started on wellchol instead of Tricor since that hasn't done much - but stopped by her PCP  Continues on Lovaza  Recheck revealed TG of 215, which is much improved  This will be continued to be followed by her PCP    LDL 34 in May 2021, at goal

## 2021-09-13 NOTE — PATIENT INSTRUCTIONS
Chest Pain   AMBULATORY CARE:   Chest pain  can be caused by a range of conditions, from not serious to life-threatening  It may be caused by a heart attack or a blood clot in your lungs  Sometimes chest pain or pressure is caused by poor blood flow to your heart (angina)  Infection, inflammation, or a fracture in the bones or cartilage in your chest can cause pain or discomfort  Chest pain can also be a symptom of a digestive problem, such as acid reflux or a stomach ulcer  An anxiety attack or a strong emotion such as anger can also cause chest pain  It is important to follow up with your healthcare provider to find the cause of your chest pain  Common symptoms you may have with chest pain:   · Fever or sweating    · Nausea or vomiting    · Shortness of breath    · Discomfort or pressure that spreads from your chest to your back, jaw, or arm    · A racing or slow heartbeat    · Feeling weak, tired, or faint    Call your local emergency number (911 in the [address] [place] [address],[address]) or have someone call if:   · You have any of the following signs of a heart attack:      ? Squeezing, pressure, or pain in your chest    ? You may  also have any of the following:     § Discomfort or pain in your back, neck, jaw, stomach, or arm    § Shortness of breath    § Nausea or vomiting    § Lightheadedness or a sudden cold sweat      Seek care immediately if:   · You have chest discomfort that gets worse, even with medicine  · You cough or vomit blood  · Your bowel movements are black or bloody  · You cannot stop vomiting, or it hurts to swallow  Call your doctor if:   · You have questions or concerns about your condition or care  Treatment for chest pain  may include medicine to treat your symptoms while your healthcare provider finds the cause of your chest pain  · Medicines  may be given to treat the cause of your chest pain   Examples include pain medicine, anxiety medicine, or medicines to increase blood flow to your heart     · Do not take certain medicines without asking your healthcare provider first   These include NSAIDs, herbal or vitamin supplements, or hormones (estrogen or progestin)  · One or more stents  may need to be placed in your heart if pain was caused by blockage  A stent is a wire mesh tube that helps hold your artery open  Healthy living tips: The following are general healthy guidelines  If the cause of your chest pain is known, your healthcare provider will give you specific guidelines to follow  · Do not smoke  Nicotine and other chemicals in cigarettes and cigars can cause lung and heart damage  Ask your healthcare provider for information if you currently smoke and need help to quit  E-cigarettes or smokeless tobacco still contain nicotine  Talk to your healthcare provider before you use these products  · Choose a variety of healthy foods as often as possible  Include fresh, frozen, or canned fruits and vegetables  Also include low-fat dairy products, fish, chicken (without skin), and lean meats  Your healthcare provider or a dietitian can help you create meal plans  You may need to avoid certain foods or drinks if your pain is caused by a digestion problem  · Lower your sodium (salt) intake  Limit foods that are high in sodium, such as canned foods, salty snacks, and cold cuts  If you add salt when you cook food, do not add more at the table  Choose low-sodium canned foods as much as possible  · Drink plenty of water every day  Water helps your body to control your temperature and blood pressure  Ask your healthcare provider how much water you should drink every day  · Ask about activity  Your healthcare provider will tell you which activities to limit or avoid  Ask when you can drive, return to work, and have sex  Ask about the best exercise plan for you  · Maintain a healthy weight  Ask your healthcare provider what a healthy weight is for you   Ask him or her to help you create a safe weight loss plan if you are overweight  · Ask about vaccines you may need  Get the influenza (flu) vaccine every year as soon as recommended, usually in September or October  You may also need a pneumococcal vaccine to prevent pneumonia  The vaccine is usually given every 5 years, starting at age 72  Your healthcare provider can tell you if should get other vaccines, and when to get them  Follow up with your healthcare provider within 72 hours, or as directed: You may need to return for more tests to find the cause of your chest pain  You may be referred to a specialist, such as a cardiologist or gastroenterologist  Write down your questions so you remember to ask them during your visits  © Copyright [employer] 2021 Information is for End User's use only and may not be sold, redistributed or otherwise used for commercial purposes  All illustrations and images included in CareNotes® are the copyrighted property of A D [employer] M [employer]  or Irma Khoury   The above information is an  only  It is not intended as medical advice for individual conditions or treatments  Talk to your doctor, nurse or pharmacist before following any medical regimen to see if it is safe and effective for you

## 2021-09-20 ENCOUNTER — HOSPITAL ENCOUNTER (OUTPATIENT)
Dept: NON INVASIVE DIAGNOSTICS | Facility: CLINIC | Age: 71
Discharge: HOME | End: 2021-09-20
Payer: MEDICARE

## 2021-09-20 ENCOUNTER — CLINICAL SUPPORT (OUTPATIENT)
Dept: NUTRITION | Facility: HOSPITAL | Age: 71
End: 2021-09-20
Attending: FAMILY MEDICINE
Payer: MEDICARE

## 2021-09-20 VITALS — WEIGHT: 170.6 LBS | BODY MASS INDEX: 30.22 KG/M2

## 2021-09-20 DIAGNOSIS — R07.89 OTHER CHEST PAIN: ICD-10-CM

## 2021-09-20 DIAGNOSIS — I10 ESSENTIAL HYPERTENSION: ICD-10-CM

## 2021-09-20 DIAGNOSIS — N18.4 STAGE 4 CHRONIC KIDNEY DISEASE (HCC): ICD-10-CM

## 2021-09-20 DIAGNOSIS — E78.5 DYSLIPIDEMIA: ICD-10-CM

## 2021-09-20 PROCEDURE — 78452 HT MUSCLE IMAGE SPECT MULT: CPT | Performed by: INTERNAL MEDICINE

## 2021-09-20 PROCEDURE — 78452 HT MUSCLE IMAGE SPECT MULT: CPT

## 2021-09-20 PROCEDURE — A9502 TC99M TETROFOSMIN: HCPCS

## 2021-09-20 PROCEDURE — 97802 MEDICAL NUTRITION INDIV IN: CPT

## 2021-09-20 PROCEDURE — 93017 CV STRESS TEST TRACING ONLY: CPT

## 2021-09-20 PROCEDURE — 93018 CV STRESS TEST I&R ONLY: CPT | Performed by: INTERNAL MEDICINE

## 2021-09-20 PROCEDURE — 93016 CV STRESS TEST SUPVJ ONLY: CPT | Performed by: INTERNAL MEDICINE

## 2021-09-20 RX ADMIN — REGADENOSON 0.4 MG: 0.08 INJECTION, SOLUTION INTRAVENOUS at 09:20

## 2021-09-20 NOTE — PROGRESS NOTES
Initial Nutrition Assessment Form    Patient Name: Zachary Patient    YOB: 1950    Sex: Female     Assessment Date: 9/20/2021  Start Time: 0 Stop Time: 1150 Total Minutes: 61     Data:  Present at session: Self- spouse   Parent/Patient Concerns: "I have gastroparesis and CKD and I have diabetes"   Medical Dx/Reason for Referral: DM/CKD   Past Medical History:   Diagnosis Date    Allergic     Anxiety     Arthritis     Aspiration into airway     Basal cell carcinoma 2007    left cheek     BCC (basal cell carcinoma) 05/27/2021    Left Nasal tip    Cancer [facility] )     squamous cell cancer on forhead    Cancer ([facility] )     basal cell on nose     Chronic kidney disease 2000, 2018    Stones, kidney disease stage 4    COVID-19 08/2021    CPAP (continuous positive airway pressure) dependence     [facility]     Disease of thyroid gland     Family history of thyroid problem     GERD (gastroesophageal reflux disease)     Heart burn     Hyperlipidemia     Hyperplasia, parathyroid ([facility] )     Hypertension     Kidney problem     Kidney stone     Obesity (BMI 30 0-34  9)     Pneumonia     RLS (restless legs syndrome)     SCC (squamous cell carcinoma) 05/04/2021    left mid forehead    Seasonal allergies     Sleep apnea     uses CPAP    Squamous cell skin cancer 2007    left cheek     Swollen ankles        Current Outpatient Medications   Medication Sig Dispense Refill    acetaminophen (TYLENOL) 500 mg tablet Take 1,000 mg by mouth as needed       albuterol (Ventolin HFA) 90 mcg/act inhaler Inhale 2 puffs every 6 (six) hours as needed for wheezing or shortness of breath  0    amLODIPine (NORVASC) 5 mg tablet TAKE 1 TABLET BY MOUTH EVERY DAY 90 tablet 3    b complex vitamins tablet Take 1 tablet by mouth daily       B-D ULTRAFINE III SHORT PEN 31G X 8 MM MISC USE AS DIRECTED 4 TIMES PER DAY  3    bisacodyl (DULCOLAX) 5 mg EC tablet Take 2 tablets (10 mg total) by mouth once for 1 dose (Patient not taking: Reported on 9/13/2021) 2 tablet 0    DULoxetine (Cymbalta) 30 mg delayed release capsule Take 30 mg by mouth daily      DULoxetine (CYMBALTA) 60 mg delayed release capsule Take 90 mg by mouth daily      famotidine (PEPCID) 40 MG tablet Take 1 tablet (40 mg total) by mouth daily Take daily in am 90 tablet 3    fluticasone (FLONASE) 50 mcg/act nasal spray 1-2 sprays daily       gabapentin (NEURONTIN) 300 mg capsule Take 300 mg by mouth daily       hydrALAZINE (APRESOLINE) 25 mg tablet Take 1 tablet (25 mg total) by mouth 2 (two) times a day 60 tablet 5    Icosapent Ethyl (Vascepa) 1 g CAPS Take 1 g by mouth 2 (two) times a day      insulin aspart (NovoLOG FlexPen) 100 UNIT/ML injection pen Inject 18-24 Units under the skin       insulin aspart (NovoLOG) 100 units/mL injection Inject under the skin 3 (three) times a day before meals 14 units, 14 units, 18 units   insulin aspart, w/niacinamide, (FIASP) 100 Units/mL injection pen Inject 14 units at breakfast and lunch then 18 units at dinner  Take 4 extra units when on steroid   Take 20 minutes before meals      insulin detemir (Levemir FlexTouch) 100 Units/mL injection pen Inject 45 Units under the skin       insulin detemir (LEVEMIR) 100 units/mL subcutaneous injection Inject 32 Units under the skin daily at bedtime (Patient taking differently: Inject 50 Units under the skin daily at bedtime )  0    levothyroxine 137 mcg tablet Take 137 mcg by mouth      metoprolol tartrate (LOPRESSOR) 50 mg tablet Take 1 tablet (50 mg total) by mouth every 12 (twelve) hours 180 tablet 3    Multiple Vitamin (multivitamin) capsule Take 1 capsule by mouth daily      olmesartan (BENICAR) 40 mg tablet TAKE 1 TABLET BY MOUTH EVERY DAY 90 tablet 0    pantoprazole (PROTONIX) 20 mg tablet Take 2 tablets (40 mg total) by mouth daily (Patient not taking: Reported on 9/10/2021) 60 tablet 5    polyethylene glycol (MIRALAX) 17 g packet Take 17 g by mouth daily      pramipexole (MIRAPEX) 0 25 mg tablet Take 1 tablet (0 25 mg total) by mouth daily 90 tablet 3    Prucalopride Succinate 1 MG TABS Take 1 mg by mouth daily (Patient not taking: Reported on 9/10/2021) 30 tablet 5    QUEtiapine (SEROquel) 25 mg tablet Take 1 tablet (25 mg total) by mouth daily at bedtime 30 tablet 5    rosuvastatin (CRESTOR) 5 mg tablet TAKE 1 TABLET BY MOUTH EVERY DAY AT NIGHT      torsemide (DEMADEX) 20 mg tablet TAKE 1 TABLET BY MOUTH EVERY DAY 90 tablet 3    Vitamin D, Ergocalciferol, 2000 units CAPS Take 2,000 Units by mouth daily        No current facility-administered medications for this visit  Additional Meds/Supplements: Vit E; miralax when feels constipated   Special Learning Needs: n/a   Height: HC Readings from Last 3 Encounters:   No data found for [facility]       Weight: Wt Readings from Last 10 Encounters:   21 77 8 kg (171 lb 9 6 oz)   09/10/21 77 1 kg (170 lb)   21 78 kg (172 lb)   21 80 3 kg (177 lb)   21 80 3 kg (177 lb)   21 77 9 kg (171 lb 12 8 oz)   21 80 kg (176 lb 5 9 oz)   21 80 3 kg (177 lb)   21 77 1 kg (170 lb)   21 75 8 kg (167 lb)     Estimated body mass index is 30 4 kg/m² as calculated from the following:    Height as of 21: 5' 3" (1 6 m)  Weight as of 21: 77 8 kg (171 lb 9 6 oz)  Recent Weight Change: []Yes     [x]No  Amount:       Energy Needs: No calculations performed for this visit   Allergies   Allergen Reactions    Ciprofloxacin Hives and Itching    NKFA   Social History     Substance and Sexual Activity   Alcohol Use Not Currently    Alcohol/week: 0 0 standard drinks    minimal   Social History     Tobacco Use   Smoking Status Former Smoker    Packs/day: 2 00    Years: 10 00    Pack years: 20 00    Types: Cigarettes    Start date: 1967   Violette Ramírez Quit date: 12    Years since quittin 7   Smokeless Tobacco Never Used    minimal   Who shops?  patient spouse   Who cooks? patient   Exercise: Likes to walk but has arthritis in feet- 2x/wk walking 1-2 blocks   Prior Counseling? []Yes     [x]No  When:      Why:         Diet Hx:  I like no salt potato chips; son likes chicken and sweet potato; 1 diet soda a day; mainly unsweetened iced tea  Breakfast: egg and toast- 1 slice (makes own bread-organic white) butter and tsp jelly if leftover bread, coffee creamer (quite a bit)  Has started drinking tea instead; recently blueberries 1/2c almonds 1/3c and cereal 1/4c starting to feel stomach pains; banana peanut butter; rice cakes and PB, cream of wheat-blueberries- creamer  7-8 a m  Rarely skips   Lunch:   1/2 s/w grilled cheese- low salt ham- potato chips; leftovers  [id number]  p m  Rarely skips       Dinner:  Cabbage onion rice tomatoes and burger    5-6  p m  Rarely skips         Snacks: AM -   PM -   HS -         Nutrition Diagnosis:   Altered Nutrition-Related Laboratory values  related to Kidney, liver, cardiac, endocrine, neurologic, and/or pulmonary dysfunction as evidenced by  Abnormal plasma glucose and/or HgbA1c levels       Medical Nutrition Therapy Intervention:  []Individualized Meal Plan []Understanding Lab Values   []Basic Pathophysiology of Disease []Food/Medication Interactions   []Food Diary []Exercise   []Lifestyle/Behavior Modification Techniques []Medication, Mechanism of Action   []Label Reading [x]Self Blood Glucose Monitoring-dexcom six on her, runs higher than she wants   []Weight/BMI Goals [x]Other - lives with  and son   Other Notes:  BM daily- miralax to help regulate Sleep apnea- decided not using CPAP anymore (has used for 15 years) will get surgery for inspirease       Comprehension: []Excellent  []Very Good  [x]Good  []Fair   []Poor    Receptivity: []Excellent  []Very Good  [x]Good  []Fair   []Poor    Expected Compliance: []Excellent  []Very Good  [x]Good  []Fair   []Poor        Goals:  1   32 oz tea daily max   2    Discussed fresh meats- low fat poultry, beef, pork, seafood   3 mrs  dash seasonings       No follow-ups on file    Labs:  CMP  Lab Results   Component Value Date    K 4 9 05/22/2021     05/22/2021    CO2 31 05/22/2021    BUN 36 (H) 05/22/2021    CREATININE 1 79 (H) 05/22/2021    GLUCOSE 168 (H) 11/30/2018    GLUF 159 (H) 05/18/2021    CALCIUM 9 1 05/22/2021    CORRECTEDCA 9 9 05/22/2021    AST 40 05/22/2021    ALT 35 05/22/2021    ALKPHOS 94 05/22/2021    EGFR 28 05/22/2021       BMP  Lab Results   Component Value Date    GLUCOSE 168 (H) 11/30/2018    CALCIUM 9 1 05/22/2021    K 4 9 05/22/2021    CO2 31 05/22/2021     05/22/2021    BUN 36 (H) 05/22/2021    CREATININE 1 79 (H) 05/22/2021       Lipids  No results found for: CHOL  No results found for: HDL  No results found for: LDLCALC  No results found for: TRIG  No results found for: CHOLHDL    Hemoglobin A1C  Lab Results   Component Value Date    HGBA1C 6 8 (H) 05/24/2021       Fasting Glucose  Lab Results   Component Value Date    GLUF 159 (H) 05/18/2021       Insulin     Thyroid  Lab Results   Component Value Date    TSH 0 89 03/06/2020       Hepatic Function Panel  Lab Results   Component Value Date    ALT 35 05/22/2021    AST 40 05/22/2021    ALKPHOS 94 05/22/2021       Celiac Disease Antibody Panel  No results found for: ENDOMYSIAL IGA, GLIADIN IGA, GLIADIN IGG, IGA, TISSUE TRANSGLUT AB, TTG IGA   Iron  No results found for: IRON, TIBC, FERRITIN    Vitamins  No results found for: VITAMIN B2   No results found for: NICOTINAMIDE, NICOTINIC ACID   No results found for: VITAMINB6  No results found for: GYMMQMZE30  No results found for: VITB5  No results found for: G1DKIZBD  No results found for: THYROGLB  No results found for: VITAMIN K   No results found for: 25-HYDROXY VIT D   No components found for: VITAMINE     DSalmon MS RD LDN  [address]  [place] [id number]

## 2021-09-21 LAB
ARRHY DURING EX: NORMAL
CHEST PAIN STATEMENT: NORMAL
MAX DIASTOLIC BP: 72 MMHG
MAX HEART RATE: 81 BPM
MAX PREDICTED HEART RATE: 148 BPM
MAX. SYSTOLIC BP: 132 MMHG
PROTOCOL NAME: NORMAL
REASON FOR TERMINATION: NORMAL
TARGET HR FORMULA: NORMAL
TEST INDICATION: NORMAL
TIME IN EXERCISE PHASE: NORMAL

## 2021-09-22 ENCOUNTER — TELEPHONE (OUTPATIENT)
Dept: CARDIOLOGY CLINIC | Facility: CLINIC | Age: 71
End: 2021-09-22

## 2021-09-22 NOTE — TELEPHONE ENCOUNTER
----- Message from Jennifer Souza MD sent at 9/20/2021  1:49 PM EDT -----  Results are within range, please notify patient

## 2021-09-24 DIAGNOSIS — Z12.11 SCREENING FOR COLON CANCER: Primary | ICD-10-CM

## 2021-09-24 RX ORDER — POLYETHYLENE GLYCOL 3350, SODIUM CHLORIDE, SODIUM BICARBONATE, POTASSIUM CHLORIDE 420; 11.2; 5.72; 1.48 G/4L; G/4L; G/4L; G/4L
4000 POWDER, FOR SOLUTION ORAL ONCE
Qty: 4000 ML | Refills: 0 | Status: SHIPPED | OUTPATIENT
Start: 2021-09-24 | End: 2021-10-11 | Stop reason: ALTCHOICE

## 2021-10-07 ENCOUNTER — OFFICE VISIT (OUTPATIENT)
Dept: PHYSICAL THERAPY | Facility: CLINIC | Age: 71
End: 2021-10-07
Payer: MEDICARE

## 2021-10-07 DIAGNOSIS — M54.40 ACUTE LEFT-SIDED LOW BACK PAIN WITH SCIATICA, SCIATICA LATERALITY UNSPECIFIED: ICD-10-CM

## 2021-10-07 DIAGNOSIS — M54.2 NECK PAIN: ICD-10-CM

## 2021-10-07 DIAGNOSIS — G89.29 CHRONIC LEFT SHOULDER PAIN: Primary | ICD-10-CM

## 2021-10-07 DIAGNOSIS — M25.512 CHRONIC LEFT SHOULDER PAIN: Primary | ICD-10-CM

## 2021-10-07 PROCEDURE — 97110 THERAPEUTIC EXERCISES: CPT | Performed by: PHYSICAL THERAPIST

## 2021-10-07 PROCEDURE — 97162 PT EVAL MOD COMPLEX 30 MIN: CPT | Performed by: PHYSICAL THERAPIST

## 2021-10-08 ENCOUNTER — TELEPHONE (OUTPATIENT)
Dept: GASTROENTEROLOGY | Facility: AMBULARY SURGERY CENTER | Age: 71
End: 2021-10-08

## 2021-10-10 ENCOUNTER — NURSE TRIAGE (OUTPATIENT)
Dept: OTHER | Facility: OTHER | Age: 71
End: 2021-10-10

## 2021-10-11 ENCOUNTER — ANESTHESIA (OUTPATIENT)
Dept: GASTROENTEROLOGY | Facility: AMBULARY SURGERY CENTER | Age: 71
End: 2021-10-11

## 2021-10-11 ENCOUNTER — HOSPITAL ENCOUNTER (OUTPATIENT)
Dept: GASTROENTEROLOGY | Facility: AMBULARY SURGERY CENTER | Age: 71
Setting detail: OUTPATIENT SURGERY
Discharge: HOME | End: 2021-10-11
Attending: INTERNAL MEDICINE | Admitting: INTERNAL MEDICINE
Payer: MEDICARE

## 2021-10-11 ENCOUNTER — ANESTHESIA EVENT (OUTPATIENT)
Dept: GASTROENTEROLOGY | Facility: AMBULARY SURGERY CENTER | Age: 71
End: 2021-10-11

## 2021-10-11 VITALS
RESPIRATION RATE: 20 BRPM | BODY MASS INDEX: 29.41 KG/M2 | WEIGHT: 166 LBS | SYSTOLIC BLOOD PRESSURE: 151 MMHG | OXYGEN SATURATION: 96 % | TEMPERATURE: 97.3 F | HEART RATE: 62 BPM | HEIGHT: 63 IN | DIASTOLIC BLOOD PRESSURE: 89 MMHG

## 2021-10-11 DIAGNOSIS — Z86.010 HISTORY OF COLON POLYPS: ICD-10-CM

## 2021-10-11 DIAGNOSIS — K59.09 CHRONIC CONSTIPATION: ICD-10-CM

## 2021-10-11 LAB
GLUCOSE SERPL-MCNC: 61 MG/DL (ref 65–140)
GLUCOSE SERPL-MCNC: 74 MG/DL (ref 65–140)

## 2021-10-11 PROCEDURE — 88305 TISSUE EXAM BY PATHOLOGIST: CPT | Performed by: PATHOLOGY

## 2021-10-11 PROCEDURE — 45385 COLONOSCOPY W/LESION REMOVAL: CPT | Performed by: INTERNAL MEDICINE

## 2021-10-11 PROCEDURE — 82948 REAGENT STRIP/BLOOD GLUCOSE: CPT

## 2021-10-11 RX ORDER — PROPOFOL 10 MG/ML
INJECTION, EMULSION INTRAVENOUS CONTINUOUS PRN
Status: DISCONTINUED | OUTPATIENT
Start: 2021-10-11 | End: 2021-10-11

## 2021-10-11 RX ORDER — LIDOCAINE HYDROCHLORIDE 10 MG/ML
INJECTION, SOLUTION EPIDURAL; INFILTRATION; INTRACAUDAL; PERINEURAL AS NEEDED
Status: DISCONTINUED | OUTPATIENT
Start: 2021-10-11 | End: 2021-10-11

## 2021-10-11 RX ORDER — PROPOFOL 10 MG/ML
INJECTION, EMULSION INTRAVENOUS AS NEEDED
Status: DISCONTINUED | OUTPATIENT
Start: 2021-10-11 | End: 2021-10-11

## 2021-10-11 RX ORDER — SODIUM CHLORIDE, SODIUM LACTATE, POTASSIUM CHLORIDE, CALCIUM CHLORIDE 600; 310; 30; 20 MG/100ML; MG/100ML; MG/100ML; MG/100ML
INJECTION, SOLUTION INTRAVENOUS CONTINUOUS PRN
Status: DISCONTINUED | OUTPATIENT
Start: 2021-10-11 | End: 2021-10-11

## 2021-10-11 RX ADMIN — SODIUM CHLORIDE, SODIUM LACTATE, POTASSIUM CHLORIDE, AND CALCIUM CHLORIDE: .6; .31; .03; .02 INJECTION, SOLUTION INTRAVENOUS at 10:15

## 2021-10-11 RX ADMIN — LIDOCAINE HYDROCHLORIDE 50 MG: 10 INJECTION, SOLUTION EPIDURAL; INFILTRATION; INTRACAUDAL at 10:30

## 2021-10-11 RX ADMIN — PROPOFOL 75 MCG/KG/MIN: 10 INJECTION, EMULSION INTRAVENOUS at 10:30

## 2021-10-11 RX ADMIN — PROPOFOL 60 MG: 10 INJECTION, EMULSION INTRAVENOUS at 10:30

## 2021-10-12 ENCOUNTER — HOSPITAL ENCOUNTER (OUTPATIENT)
Dept: NON INVASIVE DIAGNOSTICS | Facility: CLINIC | Age: 71
Discharge: HOME | End: 2021-10-12
Payer: MEDICARE

## 2021-10-12 ENCOUNTER — OFFICE VISIT (OUTPATIENT)
Dept: FAMILY MEDICINE CLINIC | Facility: CLINIC | Age: 71
End: 2021-10-12
Payer: MEDICARE

## 2021-10-12 VITALS
SYSTOLIC BLOOD PRESSURE: 134 MMHG | TEMPERATURE: 97.6 F | BODY MASS INDEX: 29.62 KG/M2 | RESPIRATION RATE: 16 BRPM | OXYGEN SATURATION: 96 % | DIASTOLIC BLOOD PRESSURE: 70 MMHG | WEIGHT: 167.2 LBS | HEART RATE: 72 BPM | HEIGHT: 63 IN

## 2021-10-12 DIAGNOSIS — E11.21 TYPE 2 DIABETES MELLITUS WITH DIABETIC NEPHROPATHY, WITH LONG-TERM CURRENT USE OF INSULIN (HCC): ICD-10-CM

## 2021-10-12 DIAGNOSIS — Z23 NEED FOR VACCINATION: ICD-10-CM

## 2021-10-12 DIAGNOSIS — F33.9 DEPRESSION, RECURRENT (HCC): ICD-10-CM

## 2021-10-12 DIAGNOSIS — Z01.818 PRE-OPERATIVE EXAMINATION: Primary | ICD-10-CM

## 2021-10-12 DIAGNOSIS — I25.10 ATHEROSCLEROTIC HEART DISEASE OF NATIVE CORONARY ARTERY WITHOUT ANGINA PECTORIS: ICD-10-CM

## 2021-10-12 DIAGNOSIS — G47.33 OBSTRUCTIVE SLEEP APNEA: ICD-10-CM

## 2021-10-12 DIAGNOSIS — Z79.4 TYPE 2 DIABETES MELLITUS WITH DIABETIC NEPHROPATHY, WITH LONG-TERM CURRENT USE OF INSULIN (HCC): ICD-10-CM

## 2021-10-12 DIAGNOSIS — I12.9 HYPERTENSIVE CHRONIC KIDNEY DISEASE WITH STAGE 1 THROUGH STAGE 4 CHRONIC KIDNEY DISEASE, OR UNSPECIFIED CHRONIC KIDNEY DISEASE: ICD-10-CM

## 2021-10-12 DIAGNOSIS — N18.32 ANEMIA OF CHRONIC RENAL FAILURE, STAGE 3B (HCC): ICD-10-CM

## 2021-10-12 DIAGNOSIS — D63.1 ANEMIA OF CHRONIC RENAL FAILURE, STAGE 3B (HCC): ICD-10-CM

## 2021-10-12 PROBLEM — R41.82 ALTERED MENTAL STATUS: Status: RESOLVED | Noted: 2020-07-14 | Resolved: 2021-10-12

## 2021-10-12 PROBLEM — R44.3 HALLUCINATIONS: Status: RESOLVED | Noted: 2020-06-19 | Resolved: 2021-10-12

## 2021-10-12 PROBLEM — R05.9 COUGH: Status: RESOLVED | Noted: 2021-05-17 | Resolved: 2021-10-12

## 2021-10-12 PROCEDURE — 93880 EXTRACRANIAL BILAT STUDY: CPT

## 2021-10-12 PROCEDURE — 93880 EXTRACRANIAL BILAT STUDY: CPT | Performed by: SURGERY

## 2021-10-12 PROCEDURE — G0008 ADMIN INFLUENZA VIRUS VAC: HCPCS

## 2021-10-12 PROCEDURE — 99214 OFFICE O/P EST MOD 30 MIN: CPT | Performed by: FAMILY MEDICINE

## 2021-10-12 PROCEDURE — 90662 IIV NO PRSV INCREASED AG IM: CPT

## 2021-10-12 RX ORDER — ROSUVASTATIN CALCIUM 10 MG/1
20 TABLET, COATED ORAL DAILY
COMMUNITY
Start: 2021-10-08 | End: 2022-04-01 | Stop reason: HOSPADM

## 2021-10-13 ENCOUNTER — APPOINTMENT (OUTPATIENT)
Dept: PHYSICAL THERAPY | Facility: CLINIC | Age: 71
End: 2021-10-13
Payer: MEDICARE

## 2021-10-14 ENCOUNTER — OFFICE VISIT (OUTPATIENT)
Dept: CARDIOLOGY CLINIC | Facility: CLINIC | Age: 71
End: 2021-10-14
Payer: MEDICARE

## 2021-10-14 VITALS
HEART RATE: 69 BPM | DIASTOLIC BLOOD PRESSURE: 68 MMHG | HEIGHT: 63 IN | BODY MASS INDEX: 30.25 KG/M2 | OXYGEN SATURATION: 97 % | WEIGHT: 170.7 LBS | SYSTOLIC BLOOD PRESSURE: 130 MMHG

## 2021-10-14 DIAGNOSIS — I10 ESSENTIAL HYPERTENSION: ICD-10-CM

## 2021-10-14 DIAGNOSIS — E78.5 DYSLIPIDEMIA: ICD-10-CM

## 2021-10-14 DIAGNOSIS — I27.20 PULMONARY HYPERTENSION (HCC): Primary | ICD-10-CM

## 2021-10-14 PROCEDURE — 99214 OFFICE O/P EST MOD 30 MIN: CPT | Performed by: INTERNAL MEDICINE

## 2021-10-15 ENCOUNTER — OFFICE VISIT (OUTPATIENT)
Dept: PHYSICAL THERAPY | Facility: CLINIC | Age: 71
End: 2021-10-15
Payer: MEDICARE

## 2021-10-15 DIAGNOSIS — G89.29 CHRONIC LEFT SHOULDER PAIN: Primary | ICD-10-CM

## 2021-10-15 DIAGNOSIS — M54.40 ACUTE LEFT-SIDED LOW BACK PAIN WITH SCIATICA, SCIATICA LATERALITY UNSPECIFIED: ICD-10-CM

## 2021-10-15 DIAGNOSIS — M25.512 CHRONIC LEFT SHOULDER PAIN: Primary | ICD-10-CM

## 2021-10-15 DIAGNOSIS — M54.2 NECK PAIN: ICD-10-CM

## 2021-10-15 PROCEDURE — 97140 MANUAL THERAPY 1/> REGIONS: CPT | Performed by: PHYSICAL THERAPIST

## 2021-10-15 PROCEDURE — 97112 NEUROMUSCULAR REEDUCATION: CPT | Performed by: PHYSICAL THERAPIST

## 2021-10-15 PROCEDURE — 97110 THERAPEUTIC EXERCISES: CPT | Performed by: PHYSICAL THERAPIST

## 2021-10-18 ENCOUNTER — TELEPHONE (OUTPATIENT)
Dept: FAMILY MEDICINE CLINIC | Facility: CLINIC | Age: 71
End: 2021-10-18

## 2021-10-18 DIAGNOSIS — N18.4 STAGE 4 CHRONIC KIDNEY DISEASE (HCC): Primary | ICD-10-CM

## 2021-10-18 PROBLEM — E66.01 MORBID (SEVERE) OBESITY DUE TO EXCESS CALORIES (HCC): Status: RESOLVED | Noted: 2020-05-04 | Resolved: 2021-10-18

## 2021-10-18 PROBLEM — I10 ESSENTIAL HYPERTENSION: Status: RESOLVED | Noted: 2020-09-23 | Resolved: 2021-10-18

## 2021-10-21 ENCOUNTER — TELEPHONE (OUTPATIENT)
Dept: GASTROENTEROLOGY | Facility: AMBULARY SURGERY CENTER | Age: 71
End: 2021-10-21

## 2021-10-21 ENCOUNTER — OFFICE VISIT (OUTPATIENT)
Dept: PHYSICAL THERAPY | Facility: CLINIC | Age: 71
End: 2021-10-21
Payer: MEDICARE

## 2021-10-21 DIAGNOSIS — M25.512 CHRONIC LEFT SHOULDER PAIN: Primary | ICD-10-CM

## 2021-10-21 DIAGNOSIS — G89.29 CHRONIC LEFT SHOULDER PAIN: Primary | ICD-10-CM

## 2021-10-21 DIAGNOSIS — M54.40 ACUTE LEFT-SIDED LOW BACK PAIN WITH SCIATICA, SCIATICA LATERALITY UNSPECIFIED: ICD-10-CM

## 2021-10-21 DIAGNOSIS — M54.2 NECK PAIN: ICD-10-CM

## 2021-10-21 PROCEDURE — 97140 MANUAL THERAPY 1/> REGIONS: CPT | Performed by: PHYSICAL THERAPIST

## 2021-10-21 PROCEDURE — 97110 THERAPEUTIC EXERCISES: CPT | Performed by: PHYSICAL THERAPIST

## 2021-10-21 NOTE — TELEPHONE ENCOUNTER
----- Message from Sonja Jurado MD sent at 10/20/2021  6:15 PM EDT -----  Please inform the patient that she had numerous colon polyps, several of these were tubular adenomas, 1 was sessile serrated adenoma, would recommend repeat colonoscopy in 3 years

## 2021-10-21 NOTE — TELEPHONE ENCOUNTER
Pt aware of results, verbalized understanding  Pt did have some discomfort after procedure but is not having any symptoms now  Advised to call office with any questions or concerns  Recall set

## 2021-10-22 ENCOUNTER — APPOINTMENT (OUTPATIENT)
Dept: LAB | Facility: CLINIC | Age: 71
End: 2021-10-22
Payer: MEDICARE

## 2021-10-22 DIAGNOSIS — G47.33 OSA ON CPAP: ICD-10-CM

## 2021-10-22 DIAGNOSIS — Z99.89 OSA ON CPAP: ICD-10-CM

## 2021-10-22 LAB
ANION GAP SERPL CALCULATED.3IONS-SCNC: 14 MMOL/L (ref 4–13)
BASOPHILS # BLD AUTO: 0.03 THOUSANDS/ΜL (ref 0–0.1)
BASOPHILS NFR BLD AUTO: 0 % (ref 0–1)
BUN SERPL-MCNC: 32 MG/DL (ref 5–25)
CALCIUM SERPL-MCNC: 9.1 MG/DL (ref 8.3–10.1)
CHLORIDE SERPL-SCNC: 100 MMOL/L (ref 100–108)
CO2 SERPL-SCNC: 26 MMOL/L (ref 21–32)
CREAT SERPL-MCNC: 1.84 MG/DL (ref 0.6–1.3)
EOSINOPHIL # BLD AUTO: 0.24 THOUSAND/ΜL (ref 0–0.61)
EOSINOPHIL NFR BLD AUTO: 3 % (ref 0–6)
ERYTHROCYTE [DISTWIDTH] IN BLOOD BY AUTOMATED COUNT: 14.9 % (ref 11.6–15.1)
GFR SERPL CREATININE-BSD FRML MDRD: 27 ML/MIN/1.73SQ M
GLUCOSE SERPL-MCNC: 156 MG/DL (ref 65–140)
HCT VFR BLD AUTO: 39.3 % (ref 34.8–46.1)
HGB BLD-MCNC: 12.4 G/DL (ref 11.5–15.4)
IMM GRANULOCYTES # BLD AUTO: 0.03 THOUSAND/UL (ref 0–0.2)
IMM GRANULOCYTES NFR BLD AUTO: 0 % (ref 0–2)
LYMPHOCYTES # BLD AUTO: 2.6 THOUSANDS/ΜL (ref 0.6–4.47)
LYMPHOCYTES NFR BLD AUTO: 30 % (ref 14–44)
MCH RBC QN AUTO: 27.9 PG (ref 26.8–34.3)
MCHC RBC AUTO-ENTMCNC: 31.6 G/DL (ref 31.4–37.4)
MCV RBC AUTO: 88 FL (ref 82–98)
MONOCYTES # BLD AUTO: 0.68 THOUSAND/ΜL (ref 0.17–1.22)
MONOCYTES NFR BLD AUTO: 8 % (ref 4–12)
NEUTROPHILS # BLD AUTO: 5.05 THOUSANDS/ΜL (ref 1.85–7.62)
NEUTS SEG NFR BLD AUTO: 59 % (ref 43–75)
NRBC BLD AUTO-RTO: 0 /100 WBCS
PLATELET # BLD AUTO: 270 THOUSANDS/UL (ref 149–390)
PMV BLD AUTO: 11.3 FL (ref 8.9–12.7)
POTASSIUM SERPL-SCNC: 4 MMOL/L (ref 3.5–5.3)
RBC # BLD AUTO: 4.45 MILLION/UL (ref 3.81–5.12)
SODIUM SERPL-SCNC: 140 MMOL/L (ref 136–145)
WBC # BLD AUTO: 8.63 THOUSAND/UL (ref 4.31–10.16)

## 2021-10-22 PROCEDURE — 85025 COMPLETE CBC W/AUTO DIFF WBC: CPT

## 2021-10-22 PROCEDURE — 36415 COLL VENOUS BLD VENIPUNCTURE: CPT

## 2021-10-22 PROCEDURE — 80048 BASIC METABOLIC PNL TOTAL CA: CPT

## 2021-10-28 ENCOUNTER — OFFICE VISIT (OUTPATIENT)
Dept: PHYSICAL THERAPY | Facility: CLINIC | Age: 71
End: 2021-10-28
Payer: MEDICARE

## 2021-10-28 DIAGNOSIS — M54.2 NECK PAIN: ICD-10-CM

## 2021-10-28 DIAGNOSIS — M25.512 CHRONIC LEFT SHOULDER PAIN: Primary | ICD-10-CM

## 2021-10-28 DIAGNOSIS — M54.40 ACUTE LEFT-SIDED LOW BACK PAIN WITH SCIATICA, SCIATICA LATERALITY UNSPECIFIED: ICD-10-CM

## 2021-10-28 DIAGNOSIS — G89.29 CHRONIC LEFT SHOULDER PAIN: Primary | ICD-10-CM

## 2021-10-28 PROCEDURE — 97140 MANUAL THERAPY 1/> REGIONS: CPT | Performed by: PHYSICAL THERAPIST

## 2021-10-28 PROCEDURE — 97110 THERAPEUTIC EXERCISES: CPT | Performed by: PHYSICAL THERAPIST

## 2021-11-04 ENCOUNTER — ANESTHESIA EVENT (OUTPATIENT)
Dept: PERIOP | Facility: HOSPITAL | Age: 71
End: 2021-11-04
Payer: MEDICARE

## 2021-11-10 ENCOUNTER — APPOINTMENT (OUTPATIENT)
Dept: RADIOLOGY | Facility: HOSPITAL | Age: 71
End: 2021-11-10
Payer: MEDICARE

## 2021-11-10 ENCOUNTER — ANESTHESIA (OUTPATIENT)
Dept: PERIOP | Facility: HOSPITAL | Age: 71
End: 2021-11-10
Payer: MEDICARE

## 2021-11-10 ENCOUNTER — HOSPITAL ENCOUNTER (OUTPATIENT)
Facility: HOSPITAL | Age: 71
Setting detail: OUTPATIENT SURGERY
Discharge: HOME | End: 2021-11-10
Attending: OTOLARYNGOLOGY | Admitting: OTOLARYNGOLOGY
Payer: MEDICARE

## 2021-11-10 VITALS
HEIGHT: 63 IN | OXYGEN SATURATION: 96 % | RESPIRATION RATE: 16 BRPM | TEMPERATURE: 97.9 F | BODY MASS INDEX: 29.73 KG/M2 | DIASTOLIC BLOOD PRESSURE: 70 MMHG | HEART RATE: 68 BPM | SYSTOLIC BLOOD PRESSURE: 148 MMHG | WEIGHT: 167.77 LBS

## 2021-11-10 DIAGNOSIS — G47.33 MODERATE OBSTRUCTIVE SLEEP APNEA: Primary | ICD-10-CM

## 2021-11-10 LAB
GLUCOSE SERPL-MCNC: 127 MG/DL (ref 65–140)
GLUCOSE SERPL-MCNC: 132 MG/DL (ref 65–140)

## 2021-11-10 PROCEDURE — 72040 X-RAY EXAM NECK SPINE 2-3 VW: CPT

## 2021-11-10 PROCEDURE — 82948 REAGENT STRIP/BLOOD GLUCOSE: CPT

## 2021-11-10 PROCEDURE — C1787 PATIENT PROGR, NEUROSTIM: HCPCS | Performed by: OTOLARYNGOLOGY

## 2021-11-10 PROCEDURE — C1778 LEAD, NEUROSTIMULATOR: HCPCS | Performed by: OTOLARYNGOLOGY

## 2021-11-10 PROCEDURE — C1767 GENERATOR, NEURO NON-RECHARG: HCPCS | Performed by: OTOLARYNGOLOGY

## 2021-11-10 PROCEDURE — 0466T PR INSRT CH WALL RESPIR ELTRD/RA & CONJ PULSE GEN: CPT | Performed by: OTOLARYNGOLOGY

## 2021-11-10 PROCEDURE — 64568 OPN IMPLTJ CRNL NRV NEA&PG: CPT | Performed by: OTOLARYNGOLOGY

## 2021-11-10 PROCEDURE — 71045 X-RAY EXAM CHEST 1 VIEW: CPT

## 2021-11-10 DEVICE — THE SENSING LEAD INCORPORATES A DIFFERENTIAL PRESSURE SENSOR THAT DETECTS RESPIRATORY CYCLES.  THE CONNECTOR END OF THE LEAD IS CONNECTED TO THE IPG.  THE SENSOR IS IMPLANTED IN BETWEEN THE INTERCOSTAL MUSCLE LAYERS. THE DEVICE SENSES RELATIVE PRESSURE VARIATIONS THAT CORRESPOND TO THE RESPIRATION CYCLE.  THE PRESSURE WAVEFORM IS MONITORED BY THE IPG AND TRIGGERS STIMULATION THERAPY SYNCHRONOUS WITH INSPIRATION
Type: IMPLANTABLE DEVICE | Site: CHEST | Status: FUNCTIONAL
Brand: INSPIRE

## 2021-11-10 DEVICE — IPG INSPIRE IV MODEL 3028: Type: IMPLANTABLE DEVICE | Site: CHEST | Status: FUNCTIONAL

## 2021-11-10 DEVICE — THE STIMULATION LEAD INCORPORATES A CUFF SEGMENT THAT INCLUDES THREE ELECTRODES IN A GUARDED-BIPOLAR CONFIGURATION, WHICH MEANS THE CENTER ELECTRODE IS CONNECTED TO ONE POLE OF THE STIMULATION (E.G., NEGATIVE POLE), AND IS FLANKED ON EACH SIDE BY TWO ELECTRODES CONNECTED TO THE OPPOSITE POLE OF STIMULATION (E.G., POSITIVE POLE).  IT IS PACKAGED WITH A TUNNELING ROD TO FACILITATE THE IMPLANT PROCEDURE.  THE CUFF IS SURGICALLY POSITIONED AROUND A PATIENT’S HYPOGLOSSAL NERVE, AND THE CONNECTOR END OF THE LEAD IS CONNECTED TO THE IPG.  THE CUFF ELECTRODES APPLY ELECTRICAL CURRENT THAT STIMULATES THE NERVE CAUSING THE TONGUE TO MOVE FORWARD.  THE TONGUE MOVEMENT (VIA GENIOGLOSSUS AND OTHER TONGUE PROTRUSOR MUSCLE CONTRACTION) SERVES TO MAINTAIN AN OPEN AIRWAY. THE FIGURE BELOW PROVIDES AN ILLUSTRATION OF THE MODEL 4063 STIMULATION LEAD.
Type: IMPLANTABLE DEVICE | Site: NECK | Status: FUNCTIONAL
Brand: INSPIRE

## 2021-11-10 RX ORDER — SODIUM CHLORIDE 9 MG/ML
125 INJECTION, SOLUTION INTRAVENOUS CONTINUOUS
Status: DISCONTINUED | OUTPATIENT
Start: 2021-11-10 | End: 2021-11-10 | Stop reason: HOSPADM

## 2021-11-10 RX ORDER — OXYCODONE HYDROCHLORIDE 5 MG/1
5 TABLET ORAL EVERY 4 HOURS PRN
Qty: 10 TABLET | Refills: 0 | Status: SHIPPED | OUTPATIENT
Start: 2021-11-10 | End: 2021-11-20

## 2021-11-10 RX ORDER — CEFAZOLIN SODIUM 2 G/50ML
2000 SOLUTION INTRAVENOUS ONCE
Status: COMPLETED | OUTPATIENT
Start: 2021-11-10 | End: 2021-11-10

## 2021-11-10 RX ORDER — LIDOCAINE HYDROCHLORIDE 20 MG/ML
INJECTION, SOLUTION EPIDURAL; INFILTRATION; INTRACAUDAL; PERINEURAL AS NEEDED
Status: DISCONTINUED | OUTPATIENT
Start: 2021-11-10 | End: 2021-11-10

## 2021-11-10 RX ORDER — LIDOCAINE HYDROCHLORIDE AND EPINEPHRINE 10; 10 MG/ML; UG/ML
INJECTION, SOLUTION INFILTRATION; PERINEURAL AS NEEDED
Status: DISCONTINUED | OUTPATIENT
Start: 2021-11-10 | End: 2021-11-10 | Stop reason: HOSPADM

## 2021-11-10 RX ORDER — PROPOFOL 10 MG/ML
INJECTION, EMULSION INTRAVENOUS AS NEEDED
Status: DISCONTINUED | OUTPATIENT
Start: 2021-11-10 | End: 2021-11-10

## 2021-11-10 RX ORDER — OXYCODONE HCL 5 MG/5 ML
5 SOLUTION, ORAL ORAL EVERY 4 HOURS PRN
Status: DISCONTINUED | OUTPATIENT
Start: 2021-11-10 | End: 2021-11-10 | Stop reason: HOSPADM

## 2021-11-10 RX ORDER — ONDANSETRON 2 MG/ML
INJECTION INTRAMUSCULAR; INTRAVENOUS AS NEEDED
Status: DISCONTINUED | OUTPATIENT
Start: 2021-11-10 | End: 2021-11-10

## 2021-11-10 RX ORDER — FENTANYL CITRATE/PF 50 MCG/ML
25 SYRINGE (ML) INJECTION
Status: DISCONTINUED | OUTPATIENT
Start: 2021-11-10 | End: 2021-11-10 | Stop reason: HOSPADM

## 2021-11-10 RX ORDER — ONDANSETRON 2 MG/ML
4 INJECTION INTRAMUSCULAR; INTRAVENOUS ONCE AS NEEDED
Status: DISCONTINUED | OUTPATIENT
Start: 2021-11-10 | End: 2021-11-10 | Stop reason: HOSPADM

## 2021-11-10 RX ORDER — FENTANYL CITRATE 50 UG/ML
INJECTION, SOLUTION INTRAMUSCULAR; INTRAVENOUS AS NEEDED
Status: DISCONTINUED | OUTPATIENT
Start: 2021-11-10 | End: 2021-11-10

## 2021-11-10 RX ORDER — ACETAMINOPHEN 160 MG/5ML
650 SUSPENSION, ORAL (FINAL DOSE FORM) ORAL ONCE
Status: DISCONTINUED | OUTPATIENT
Start: 2021-11-10 | End: 2021-11-10 | Stop reason: HOSPADM

## 2021-11-10 RX ORDER — MIDAZOLAM HYDROCHLORIDE 2 MG/2ML
INJECTION, SOLUTION INTRAMUSCULAR; INTRAVENOUS AS NEEDED
Status: DISCONTINUED | OUTPATIENT
Start: 2021-11-10 | End: 2021-11-10

## 2021-11-10 RX ADMIN — ONDANSETRON 4 MG: 2 INJECTION INTRAMUSCULAR; INTRAVENOUS at 15:24

## 2021-11-10 RX ADMIN — MIDAZOLAM 2 MG: 1 INJECTION INTRAMUSCULAR; INTRAVENOUS at 13:11

## 2021-11-10 RX ADMIN — SODIUM CHLORIDE 125 ML/HR: 0.9 INJECTION, SOLUTION INTRAVENOUS at 11:22

## 2021-11-10 RX ADMIN — PROPOFOL 200 MG: 10 INJECTION, EMULSION INTRAVENOUS at 13:17

## 2021-11-10 RX ADMIN — CEFAZOLIN SODIUM 2000 MG: 2 SOLUTION INTRAVENOUS at 13:02

## 2021-11-10 RX ADMIN — FENTANYL CITRATE 50 MCG: 50 INJECTION INTRAMUSCULAR; INTRAVENOUS at 13:17

## 2021-11-10 RX ADMIN — LIDOCAINE HYDROCHLORIDE 100 MG: 20 INJECTION, SOLUTION EPIDURAL; INFILTRATION; INTRACAUDAL; PERINEURAL at 13:17

## 2021-11-10 RX ADMIN — FENTANYL CITRATE 25 MCG: 50 INJECTION INTRAMUSCULAR; INTRAVENOUS at 16:30

## 2021-11-10 RX ADMIN — FENTANYL CITRATE 25 MCG: 50 INJECTION INTRAMUSCULAR; INTRAVENOUS at 16:03

## 2021-11-10 RX ADMIN — SODIUM CHLORIDE: 0.9 INJECTION, SOLUTION INTRAVENOUS at 14:45

## 2021-11-10 RX ADMIN — FENTANYL CITRATE 25 MCG: 50 INJECTION INTRAMUSCULAR; INTRAVENOUS at 14:39

## 2021-11-10 RX ADMIN — REMIFENTANIL HYDROCHLORIDE 0.1 MCG/KG/MIN: 1 INJECTION, POWDER, LYOPHILIZED, FOR SOLUTION INTRAVENOUS at 13:18

## 2021-11-11 ENCOUNTER — APPOINTMENT (OUTPATIENT)
Dept: LAB | Facility: CLINIC | Age: 71
End: 2021-11-11
Payer: MEDICARE

## 2021-11-11 DIAGNOSIS — N18.4 STAGE 4 CHRONIC KIDNEY DISEASE (HCC): ICD-10-CM

## 2021-11-11 LAB
ANION GAP SERPL CALCULATED.3IONS-SCNC: 9 MMOL/L (ref 4–13)
BUN SERPL-MCNC: 41 MG/DL (ref 5–25)
CALCIUM SERPL-MCNC: 8.6 MG/DL (ref 8.3–10.1)
CHLORIDE SERPL-SCNC: 104 MMOL/L (ref 100–108)
CO2 SERPL-SCNC: 29 MMOL/L (ref 21–32)
CREAT SERPL-MCNC: 1.9 MG/DL (ref 0.6–1.3)
GFR SERPL CREATININE-BSD FRML MDRD: 26 ML/MIN/1.73SQ M
GLUCOSE SERPL-MCNC: 88 MG/DL (ref 65–140)
POTASSIUM SERPL-SCNC: 3.6 MMOL/L (ref 3.5–5.3)
SODIUM SERPL-SCNC: 142 MMOL/L (ref 136–145)

## 2021-11-11 PROCEDURE — 80048 BASIC METABOLIC PNL TOTAL CA: CPT

## 2021-11-11 PROCEDURE — 36415 COLL VENOUS BLD VENIPUNCTURE: CPT

## 2021-11-17 ENCOUNTER — APPOINTMENT (OUTPATIENT)
Dept: LAB | Facility: CLINIC | Age: 71
End: 2021-11-17
Payer: MEDICARE

## 2021-11-17 ENCOUNTER — TELEPHONE (OUTPATIENT)
Dept: NEPHROLOGY | Facility: CLINIC | Age: 71
End: 2021-11-17

## 2021-11-17 DIAGNOSIS — E11.21 TYPE 2 DIABETES MELLITUS WITH DIABETIC NEPHROPATHY, WITH LONG-TERM CURRENT USE OF INSULIN (HCC): ICD-10-CM

## 2021-11-17 DIAGNOSIS — D63.1 ANEMIA IN STAGE 4 CHRONIC KIDNEY DISEASE (HCC): ICD-10-CM

## 2021-11-17 DIAGNOSIS — E55.9 VITAMIN D DEFICIENCY: ICD-10-CM

## 2021-11-17 DIAGNOSIS — I12.9 HYPERTENSIVE CHRONIC KIDNEY DISEASE WITH STAGE 1 THROUGH STAGE 4 CHRONIC KIDNEY DISEASE, OR UNSPECIFIED CHRONIC KIDNEY DISEASE: ICD-10-CM

## 2021-11-17 DIAGNOSIS — N18.4 STAGE 4 CHRONIC KIDNEY DISEASE (HCC): ICD-10-CM

## 2021-11-17 DIAGNOSIS — Z87.442 HISTORY OF KIDNEY STONES: ICD-10-CM

## 2021-11-17 DIAGNOSIS — N18.30 BENIGN HYPERTENSION WITH CKD (CHRONIC KIDNEY DISEASE) STAGE III (HCC): ICD-10-CM

## 2021-11-17 DIAGNOSIS — I12.9 BENIGN HYPERTENSION WITH CKD (CHRONIC KIDNEY DISEASE) STAGE III (HCC): ICD-10-CM

## 2021-11-17 DIAGNOSIS — N18.4 ANEMIA IN STAGE 4 CHRONIC KIDNEY DISEASE (HCC): ICD-10-CM

## 2021-11-17 DIAGNOSIS — I10 ESSENTIAL HYPERTENSION: ICD-10-CM

## 2021-11-17 DIAGNOSIS — Z79.4 TYPE 2 DIABETES MELLITUS WITH DIABETIC NEPHROPATHY, WITH LONG-TERM CURRENT USE OF INSULIN (HCC): ICD-10-CM

## 2021-11-17 DIAGNOSIS — R80.1 PERSISTENT PROTEINURIA: ICD-10-CM

## 2021-11-17 LAB
ANION GAP SERPL CALCULATED.3IONS-SCNC: 9 MMOL/L (ref 4–13)
BUN SERPL-MCNC: 44 MG/DL (ref 5–25)
CALCIUM SERPL-MCNC: 8.9 MG/DL (ref 8.3–10.1)
CHLORIDE SERPL-SCNC: 103 MMOL/L (ref 100–108)
CO2 SERPL-SCNC: 27 MMOL/L (ref 21–32)
CREAT SERPL-MCNC: 1.76 MG/DL (ref 0.6–1.3)
GFR SERPL CREATININE-BSD FRML MDRD: 28 ML/MIN/1.73SQ M
GLUCOSE P FAST SERPL-MCNC: 165 MG/DL (ref 65–99)
POTASSIUM SERPL-SCNC: 4 MMOL/L (ref 3.5–5.3)
SODIUM SERPL-SCNC: 139 MMOL/L (ref 136–145)

## 2021-11-17 PROCEDURE — 36415 COLL VENOUS BLD VENIPUNCTURE: CPT

## 2021-11-17 PROCEDURE — 80048 BASIC METABOLIC PNL TOTAL CA: CPT

## 2021-12-22 ENCOUNTER — OFFICE VISIT (OUTPATIENT)
Dept: SLEEP CENTER | Facility: CLINIC | Age: 71
End: 2021-12-22
Payer: MEDICARE

## 2021-12-22 VITALS
BODY MASS INDEX: 30.33 KG/M2 | SYSTOLIC BLOOD PRESSURE: 147 MMHG | HEIGHT: 63 IN | HEART RATE: 65 BPM | WEIGHT: 171.2 LBS | DIASTOLIC BLOOD PRESSURE: 68 MMHG

## 2021-12-22 DIAGNOSIS — G25.81 RLS (RESTLESS LEGS SYNDROME): ICD-10-CM

## 2021-12-22 DIAGNOSIS — G47.33 OSA (OBSTRUCTIVE SLEEP APNEA): Primary | ICD-10-CM

## 2021-12-22 PROCEDURE — 99215 OFFICE O/P EST HI 40 MIN: CPT | Performed by: INTERNAL MEDICINE

## 2021-12-22 RX ORDER — PRAMIPEXOLE DIHYDROCHLORIDE 0.25 MG/1
0.25 TABLET ORAL
Qty: 90 TABLET | Refills: 3 | Status: SHIPPED | OUTPATIENT
Start: 2021-12-22 | End: 2022-04-28

## 2021-12-22 RX ORDER — GABAPENTIN 100 MG/1
100 CAPSULE ORAL DAILY
COMMUNITY
Start: 2021-12-14

## 2022-01-12 ENCOUNTER — TELEPHONE (OUTPATIENT)
Dept: SLEEP CENTER | Facility: CLINIC | Age: 72
End: 2022-01-12

## 2022-01-12 NOTE — TELEPHONE ENCOUNTER
Called patient for 3 week Inspire activation follow up  Patient states she is using device every night and is currently at step 8  She feels well rested and feels device is working well  Reminded patient of sleep study scheduled 3/23/22  Advised to call office if she should have any problems, questions or concerns

## 2022-01-13 ENCOUNTER — PATIENT MESSAGE (OUTPATIENT)
Dept: FAMILY MEDICINE CLINIC | Facility: CLINIC | Age: 72
End: 2022-01-13

## 2022-01-13 DIAGNOSIS — J30.2 SEASONAL ALLERGIES: Primary | ICD-10-CM

## 2022-01-13 RX ORDER — FLUTICASONE PROPIONATE 50 MCG
1-2 SPRAY, SUSPENSION (ML) NASAL DAILY
Qty: 18.2 ML | Refills: 5 | Status: SHIPPED | OUTPATIENT
Start: 2022-01-13

## 2022-01-13 NOTE — TELEPHONE ENCOUNTER
Medication refill requested: fluticasone (FLONASE) 50 mcg/act nasal spray  Last office visit: 10/12/2021  Next office visit: None   Last refilled: 07/01/2020 Historical provider   Pharmacy:   [facility] [address] [place] [address]  [address] [place] [address]  Phone: [phone] Fax: [phone]    [place] [address] [address]  Phone: [phone] Fax: [phone]      Pended: 25  2mLx1

## 2022-01-24 ENCOUNTER — HOSPITAL ENCOUNTER (OUTPATIENT)
Dept: CT IMAGING | Facility: HOSPITAL | Age: 72
Discharge: HOME | End: 2022-01-24
Payer: MEDICARE

## 2022-01-24 DIAGNOSIS — M20.21 HALLUX RIGIDUS OF RIGHT FOOT: ICD-10-CM

## 2022-01-24 PROCEDURE — 73700 CT LOWER EXTREMITY W/O DYE: CPT

## 2022-01-24 PROCEDURE — G1004 CDSM NDSC: HCPCS

## 2022-01-28 ENCOUNTER — TELEPHONE (OUTPATIENT)
Dept: NEPHROLOGY | Facility: CLINIC | Age: 72
End: 2022-01-28

## 2022-02-06 PROBLEM — E11.21 DIABETIC NEPHROPATHY ASSOCIATED WITH TYPE 2 DIABETES MELLITUS (HCC): Status: ACTIVE | Noted: 2022-02-06

## 2022-02-06 NOTE — PROGRESS NOTES
RENAL FOLLOW UP NOTE: td     ASSESSMENT AND PLAN:  1   CKD stage 4 :  · Etiology:  Diabetic nephropathy/hypertensive nephrosclerosis/arteriolar nephrosclerosis/chronic NSAID use   No evidence of obstructive uropathy, renal artery disease or primary glomerular disease with a bland urinalysis  Of note, CPK was normal at 105 no evidence rhabdomyolysis  · Baseline creatinine:  1 5-2 0  · Current creatinine:  1 65 at baseline  · Urine protein creatinine ratio:  0 79 g at goal!!  · UA demonstrated no significant hematuria but 3+ proteinuria from  · Serologies:  Normal C3/C4; negative rheumatoid factor; negative SPEP:  Negative UPEP;  Light chain ratio 2 09 essentially normal for chronic kidney disease  negative EWELINA; negative hepatitis-C; normal IgA; normal ASO; negative RPR  Recommendations:  · Treat hypertension-please see below  · Treat dyslipidemia-please see below  · Maintain proteinuria less than 1 g or as low as possible  · Avoid nephrotoxic agents such as NSAIDs, patient counseled as such     2   Volume:   · Current status:  Euvolemic  · Torsemide dose:  Continue current dose  3   Hypertension:  Workup:  · saline suppression test for primary aldosterone state was normal  · plasma free metanephrines was negative  · renal artery duplex was negative 02/2019       Current blood pressure averages:   A m :   139/70, standing 130/70  P m :  142/74, standing 31/70  Heart rate:   60 range        · Goal blood pressure:  less than 130/80 given less than 1 g of proteinuria at this time  Recommendations:  · Push nonmedical regimen including weight loss, isotonic exercise and avoidance of salt; patient counseled as such  · Medication changes today:   · Trial as spironolactone 25 mg Monday Wednesday and Friday to try to gently decrease systolic readings and may help proteinuria  We have to be cognizant of the potassium    4   Electrolytes:  all acceptable including a potassium of 4 5:   5   Mineral bone disorder:  · Calcium/magnesium/phosphorus:  All acceptable, only borderline high magnesium at 2 3, avoid magnesium products  · PTH intact:  115 1 improved slightly elevated but will monitor for now  · Vitamin-D:  32  6   Dyslipidemia:  · Goal LDL:  Less than 100  · Current lipid profile:  LDL not able to calculate/HDL 33/triglycerides 416  Recommendations:  Per Endocrinology but essentially at goal  7   Anemia:   · Current hemoglobin  12 2 essentially normal  8   Other problems:  · Depression  · Diabetes mellitus per primary medical physician  · Hypothyroidism  · BARBARA on CPAP  · Fibromyalgia/osteoarthritis:  Patient with myoclonic movements, seems related to gabapentin it was adjusted with resolution  · Nephrolithiasis  · Basal cell/squamous CA of the skin  · Urinary stress incontinence  · Status post incisional hernia repairs  · Recent hospitalization as outlined below from severe GERD with hypoxemia and shortness of breath from a failed Nissen fundoplication from prior hiatal hernia repair   Patient is due to have further testing with an EGD by GI and then subsequently thoracic surgery consultation for possible repair(however, according to the patient at this juncture she was felt I risk so no surgery is planned at this time)  In addition, she was placed on Protonix 40 b i d  and Pepcid 40 mg hs  · Hospitalization on 07/14/2020 secondary confusion at home   Apparently she had protracted hospital course in [place] following aspiration pneumonia   Ten days in the ICU on a ventilator   No overt infectious process   Low probability for pulmonary embolus despite an elevated D-dimer   Had mild leukocytosis/SIRS criteria subsequently discharged 1 day later when she clinically improved   Symptoms were felt secondary to her protracted ICU course  · Recent hospitalization with discharge on 05/18/21:  · Epigastric pain following an EGD on 05/13/2021 for Botox injection at the pylorus for treatment for gastroparesis   Apparently associated with chocolate ingestion and possible cough with aspiration  · Complicated by ANDRA with creatinine to 2 6 which improved with IV fluids TO 1 92 ON 05/18/2021        GI health maintenance:  Last colonoscopy:  According to the patient less than 5 years ago; GI has seen her and is planning a colonoscopy Clau Kramer the GI PA)    PATIENT INSTRUCTIONS:    Patient Instructions   1  Medication changes today:   Please begin spironolactone 25 mg every Monday Wednesday Friday 3 days a week  Please watch for breast soreness or enlargement and call if you developed any  2  Please go for non fasting  lab work 1-2 weeks after making the above medication change  3  Please take 1 week a blood pressure readings  at least 4 weeks after making the above medication change     AS FOLLOWS  MORNING AND EVENING, SITTING AND STANDING as follows:  · TAKE THE MORNING READINGS BEFORE ANY MEDICATIONS AND WHEN YOU ARE RELAXED FOR SEVERAL MINUTES  · TAKE THE EVENING READINGS:  BETWEEN 7-10 P M ; PRIOR TO ANY MEDICATIONS; AT LEAST IN OUR  FROM DINNER; AND CERTAINLY AFTER RELAXING FOR A FEW MINUTES  · PLEASE INCLUDE HEART RATE WITH YOUR BLOOD PRESSURE READINGS  · When taking standing readings, keep your arm supported at heart level and not dangling  · Make sure you are sitting with your back supported and feet on the ground and do not cross your legs or feet  · Make sure you have not taken any coffee or caffeine products or exercised or smoke cigarettes at least 30 minutes before taking your blood pressure  Then please mail these readings into the office    4  Follow-up in 4  months   Please bring in 1 week a blood pressure readings morning evening, sitting and standing is outlined above   Please go for fasting lab work 1-2 weeks prior to your appointment      5   General instructions:   AVOID SALT BUT NOT ADDING AN READING LABELS TO MAKE SURE THERE IS LOW-SALT IN THE FOOD THAT YOU ARE EATING  o Goal is less than 2 g of sodium intake or less than 5 g of sodium chloride intake per day     Avoid nonsteroidal anti-inflammatory drugs such as Naprosyn, ibuprofen, Aleve, Advil, Celebrex, Meloxicam (Mobic) etc   You can use Tylenol as needed if you do not have any liver condition to be concerned about     Avoid medications such as Sudafed or decongestants and antihistamines that contained the D component which is the decongestant  You can take antihistamines without the decongestant or D component   Try to avoid medications such as pantoprazole or  Protonix/Nexium or Esomeprazole)/Prilosec or omeprazole/Prevacid or lansoprazole/AcipHex or Rabeprazole  If you are able to, use Pepcid as this is safer for your kidneys   Try to exercise at least 30 minutes 3 days a week to begin with with an ultimate goal of 5 days a week for at least 30 minutes     Try to lose 5-10 lb by your next visit     Please do not drink more than 2 glasses of alcohol/wine on a daily basis as this may contribute to your high blood pressure  Subjective: There has been no hospitalizations or acute illnesses since last visit  The patient overall is feeling well  She had an inspire device placed for sleep apnea with excellent results  No fevers, chills, or cough or colds    Good appetite and good energy  No hematuria, dysuria, voiding symptoms, occasional foamy urine unchanged  No gastrointestinal symptoms:  Except for some mild nausea couple times a week:  She has known gastroparesis had Botox she does followed by GI  No cardiovascular symptoms including swelling of the legs  No headaches, dizziness or lightheadedness  Blood pressure medications:   Torsemide 20 mg daily in the morning   Olmesartan 40 mg daily HS   Metoprolol tartrate 50 mg twice a day   Hydralazine 25 mg twice a day   Amlodipine 5 mg at dinner time      ROS:  See HPI, otherwise review of systems as completely reviewed with the patient are negative    Past Medical History:   Diagnosis Date    Allergic     Anesthesia     pt reports "had to use double lumen endo tube for hiatal hernia repair/so surgeon could get to where he needed to work"    Arthritis     Aspiration into airway     Basal cell carcinoma 2007    left cheek     BCC (basal cell carcinoma) 05/27/2021    Left Nasal tip    Cancer ([facility] [address] )     squamous cell cancer on forhead    [facility]     basal cell on nose     Chronic kidney disease 2000, 2018    Stones, kidney disease stage 4    Chronic pain disorder     bilat feet and joint pain on occas    Colon polyp     COVID-19 08/2021    recovered at home/did receive monoclonal infusion    CPAP (continuous positive airway pressure) dependence     not using as has been recalled    Dental bridge present     Depression     Diabetes mellitus ([place] [id number] )     Type 2    Diabetic neuropathy (HCC)     Disease of thyroid gland     Family history of thyroid problem     Fatty liver     GERD (gastroesophageal reflux disease)     Heart burn     History of pneumonia     Hyperlipidemia     Hyper[facility]roid [facility]     Hypertension     Kidney problem     Kidney stone     Motion sickness     Obesity (BMI 30 0-34  9)     Pollen allergies     RLS (restless legs syndrome)     SCC (squamous cell carcinoma) 05/04/2021    left mid forehead    Seasonal allergies     Sleep apnea     uses CPAP    Squamous cell skin cancer 2007    left cheek     Swollen ankles     Wears glasses      Past Surgical History:   Procedure Laterality Date    ARTHROSCOPY KNEE      BREAST BIOPSY      stereotactic-benign    BREAST BIOPSY      stereo-benign    BREAST EXCISIONAL BIOPSY      unknown date-benign    BREAST EXCISIONAL BIOPSY      unknown date-benign    BREAST EXCISIONAL BIOPSY      unknown date-benign    BREAST EXCISIONAL BIOPSY      unknown age-benign    CHOLECYSTECTOMY      COLONOSCOPY      EXAMINATION UNDER ANESTHESIA N/A 6/24/2021    Procedure: EXAM UNDER ANESTHESIA (EUA), DISE;  Surgeon: Perez Gregorio MD;  [facility];  Service: ENT    HERNIA REPAIR      HIATAL HERNIA REPAIR      LIPOSUCTION      MOHS SURGERY  05/20/2021    left mid forehead-Evan    MOHS SURGERY Left 05/27/2021    Left nasal tip- evan     LA INCISION IMPLANT CRANIAL NERVE STIM ELECTRODE/PULSE GEN N/A 11/10/2021    Procedure: INSERTION UPPER AIRWAY STIMULATOR, INSPIRE IMPLANT;  Surgeon: Perez Gregorio MD;  Location[facility];  Service: ENT    REDUCTION MAMMAPLASTY Bilateral 2000    REDUCTION MAMMAPLASTY      SKIN BIOPSY      SKIN CANCER EXCISION  2007    squamous cell carcinoma     SKIN CANCER EXCISION  2007    basal cell carcinoma    SQUAMOUS CELL CARCINOMA EXCISION      TOE SURGERY      TONSILLECTOMY      TUBAL LIGATION      UPPER GASTROINTESTINAL ENDOSCOPY       Family History   Problem Relation Age of Onset    Heart disease Mother     Depression Mother     Hypertension Mother     COPD Mother     Hearing loss Mother     Heart disease Father     Lung cancer Father 79        Smoker     Cancer Father         brain    Alcohol abuse Father     Dementia Father     Hypertension Father     Thyroid disease Father     COPD Father     Arthritis Father     Brain cancer Father 76    Hypertension Sister     Diabetes Sister     Heart disease Sister     Thyroid disease Sister     Cancer Sister         Lympoma    Lung cancer Sister 78    Hypertension Brother     Diabetes Brother     Cancer Brother         Throat    Dementia Brother     Stroke Brother     Hypertension Brother     No Known Problems Son     No Known Problems Son     Heart disease Brother     Diabetes Brother     Brain cancer Paternal Aunt         unknown age      reports that she quit smoking about 46 years ago  Her smoking use included cigarettes  She started smoking about 54 years ago  She has a 20 00 pack-year smoking history   She has never used smokeless tobacco  She reports current alcohol use  She reports that she does not use drugs  I COMPLETELY REVIEWED THE PAST MEDICAL HISTORY/PAST SURGICAL HISTORY/SOCIAL HISTORY/FAMILY HISTORY/AND MEDICATIONS  AND UPDATED ALL    Objective:     Vitals:   BP sitting on right:  134/68 with a heart rate of 60 and regular  BP standing on right:  130/64 with a heart rate of 60 and regular    Weight (last 2 days)     None        Wt Readings from Last 3 Encounters:   01/04/22 77 6 kg (171 lb)   12/22/21 77 7 kg (171 lb 3 2 oz)   11/16/21 76 2 kg (168 lb)       Body mass index is 30 29 kg/m²      Physical Exam: General:  No acute distress  Skin:  No acute rash  Eyes:  No scleral icterus, noninjected, no discharge from eyes  ENT:  Moist mucous membranes  Neck:  Supple, no jugular venous distention, trachea is midline, no lymphadenopathy and no thyromegaly  Back   No CVAT  Chest:  Clear to auscultation and percussion, good respiratory effort  CVS:  Regular rate and rhythm without a rub, or gallops or murmurs  Abdomen:  Obese,Soft and nontender with normal bowel sounds  Extremities:  No cyanosis and no edema, no arthritic changes, normal range of motion  Neuro:  Grossly intact  Psych:  Alert, oriented x3 and appropriate      Medications:    Current Outpatient Medications:     acetaminophen (TYLENOL) 500 mg tablet, Take 1,000 mg by mouth as needed , Disp: , Rfl:     amLODIPine (NORVASC) 5 mg tablet, TAKE 1 TABLET BY MOUTH EVERY DAY (Patient taking differently: every evening ), Disp: 90 tablet, Rfl: 3    b complex vitamins tablet, Take 1 tablet by mouth daily , Disp: , Rfl:     B-D ULTRAFINE III SHORT PEN 31G X 8 MM MISC, USE AS DIRECTED 4 TIMES PER DAY, Disp: , Rfl: 3    DULoxetine (Cymbalta) 30 mg delayed release capsule, Take 30 mg by mouth daily, Disp: , Rfl:     DULoxetine (CYMBALTA) 60 mg delayed release capsule, Take 90 mg by mouth daily, Disp: , Rfl:     famotidine (PEPCID) 40 MG tablet, Take 1 tablet (40 mg total) by mouth daily Take daily in am, Disp: 90 tablet, Rfl: 3    fluticasone (FLONASE) 50 mcg/act nasal spray, 1-2 sprays into each nostril daily, Disp: 18 2 mL, Rfl: 5    gabapentin (NEURONTIN) 100 mg capsule, Take 100 mg by mouth daily, Disp: , Rfl:     hydrALAZINE (APRESOLINE) 25 mg tablet, Take 1 tablet (25 mg total) by mouth 2 (two) times a day, Disp: 60 tablet, Rfl: 5    Icosapent Ethyl (Vascepa) 1 g CAPS, Take 1 g by mouth 2 (two) times a day, Disp: , Rfl:     insulin aspart (NovoLOG) 100 units/mL injection, Inject under the skin 3 (three) times a day before meals 14 units, 14 units, 18 units  , Disp: , Rfl:     insulin detemir (Levemir FlexTouch) 100 Units/mL injection pen, Inject 50 Units under the skin every evening , Disp: , Rfl:     levothyroxine 137 mcg tablet, Take 137 mcg by mouth, Disp: , Rfl:     metoprolol tartrate (LOPRESSOR) 50 mg tablet, Take 1 tablet (50 mg total) by mouth every 12 (twelve) hours, Disp: 180 tablet, Rfl: 3    Multiple Vitamin (multivitamin) capsule, Take 1 capsule by mouth daily, Disp: , Rfl:     olmesartan (BENICAR) 40 mg tablet, TAKE 1 TABLET BY MOUTH EVERY DAY, Disp: 90 tablet, Rfl: 3    pramipexole (MIRAPEX) 0 25 mg tablet, Take 1 tablet (0 25 mg total) by mouth daily at bedtime, Disp: 90 tablet, Rfl: 3    QUEtiapine (SEROquel) 25 mg tablet, Take 1 tablet (25 mg total) by mouth daily at bedtime, Disp: 30 tablet, Rfl: 5    rosuvastatin (CRESTOR) 10 MG tablet, Take 20 mg by mouth daily  , Disp: , Rfl:     torsemide (DEMADEX) 20 mg tablet, TAKE 1 TABLET BY MOUTH EVERY DAY, Disp: 90 tablet, Rfl: 3    Vitamin D, Ergocalciferol, 2000 units CAPS, Take 2,000 Units by mouth daily , Disp: , Rfl:     albuterol (Ventolin HFA) 90 mcg/act inhaler, Inhale 2 puffs every 6 (six) hours as needed for wheezing or shortness of breath (Patient not taking: Reported on 12/22/2021 ), Disp: , Rfl: 0    [START ON 2/16/2022] spironolactone (ALDACTONE) 25 mg tablet, Take 1 tablet (25 mg total) by mouth 3 (three) times a week, Disp: 15 tablet, Rfl: 5    Lab, Imaging and other studies: I have personally reviewed pertinent labs  Laboratory Results:  Results for orders placed or performed in visit on 01/06/22   Hemoglobin A1C   Result Value Ref Range    Hemoglobin A1C 6 8              Invalid input(s): ALBUMIN      Radiology review:   chest X-ray    Ultrasound      Portions of the record may have been created with voice recognition software  Occasional wrong word or "sound a like" substitutions may have occurred due to the inherent limitations of voice recognition software  Read the chart carefully and recognize, using context, where substitutions have occurred

## 2022-02-15 ENCOUNTER — OFFICE VISIT (OUTPATIENT)
Dept: NEPHROLOGY | Facility: CLINIC | Age: 72
End: 2022-02-15
Payer: MEDICARE

## 2022-02-15 VITALS — BODY MASS INDEX: 30.29 KG/M2 | HEIGHT: 63 IN

## 2022-02-15 DIAGNOSIS — E11.21 DIABETIC NEPHROPATHY ASSOCIATED WITH TYPE 2 DIABETES MELLITUS (HCC): ICD-10-CM

## 2022-02-15 DIAGNOSIS — E78.5 DYSLIPIDEMIA: ICD-10-CM

## 2022-02-15 DIAGNOSIS — I12.9 HYPERTENSIVE CHRONIC KIDNEY DISEASE WITH STAGE 1 THROUGH STAGE 4 CHRONIC KIDNEY DISEASE, OR UNSPECIFIED CHRONIC KIDNEY DISEASE: Primary | ICD-10-CM

## 2022-02-15 DIAGNOSIS — N18.4 ANEMIA IN STAGE 4 CHRONIC KIDNEY DISEASE (HCC): ICD-10-CM

## 2022-02-15 DIAGNOSIS — E55.9 VITAMIN D DEFICIENCY: ICD-10-CM

## 2022-02-15 DIAGNOSIS — D63.1 ANEMIA IN STAGE 4 CHRONIC KIDNEY DISEASE (HCC): ICD-10-CM

## 2022-02-15 DIAGNOSIS — N18.4 STAGE 4 CHRONIC KIDNEY DISEASE (HCC): ICD-10-CM

## 2022-02-15 PROCEDURE — 99214 OFFICE O/P EST MOD 30 MIN: CPT | Performed by: INTERNAL MEDICINE

## 2022-02-15 RX ORDER — SPIRONOLACTONE 25 MG/1
25 TABLET ORAL 3 TIMES WEEKLY
Qty: 15 TABLET | Refills: 5 | Status: SHIPPED | OUTPATIENT
Start: 2022-02-16 | End: 2022-04-14 | Stop reason: HOSPADM

## 2022-02-15 NOTE — PATIENT INSTRUCTIONS
1  Medication changes today:   Please begin spironolactone 25 mg every Monday Wednesday Friday 3 days a week  Please watch for breast soreness or enlargement and call if you developed any  2  Please go for non fasting  lab work 1-2 weeks after making the above medication change  3  Please take 1 week a blood pressure readings  at least 4 weeks after making the above medication change     AS FOLLOWS  MORNING AND EVENING, SITTING AND STANDING as follows:  · TAKE THE MORNING READINGS BEFORE ANY MEDICATIONS AND WHEN YOU ARE RELAXED FOR SEVERAL MINUTES  · TAKE THE EVENING READINGS:  BETWEEN 7-10 P M ; PRIOR TO ANY MEDICATIONS; AT LEAST IN OUR  FROM DINNER; AND CERTAINLY AFTER RELAXING FOR A FEW MINUTES  · PLEASE INCLUDE HEART RATE WITH YOUR BLOOD PRESSURE READINGS  · When taking standing readings, keep your arm supported at heart level and not dangling  · Make sure you are sitting with your back supported and feet on the ground and do not cross your legs or feet  · Make sure you have not taken any coffee or caffeine products or exercised or smoke cigarettes at least 30 minutes before taking your blood pressure  Then please mail these readings into the office    4  Follow-up in 4  months   Please bring in 1 week a blood pressure readings morning evening, sitting and standing is outlined above   Please go for fasting lab work 1-2 weeks prior to your appointment      5   General instructions:   AVOID SALT BUT NOT ADDING AN READING LABELS TO MAKE SURE THERE IS LOW-SALT IN THE FOOD THAT YOU ARE EATING  o Goal is less than 2 g of sodium intake or less than 5 g of sodium chloride intake per day     Avoid nonsteroidal anti-inflammatory drugs such as Naprosyn, ibuprofen, Aleve, Advil, Celebrex, Meloxicam (Mobic) etc   You can use Tylenol as needed if you do not have any liver condition to be concerned about     Avoid medications such as Sudafed or decongestants and antihistamines that contained the D component which is the decongestant  You can take antihistamines without the decongestant or D component   Try to avoid medications such as pantoprazole or  Protonix/Nexium or Esomeprazole)/Prilosec or omeprazole/Prevacid or lansoprazole/AcipHex or Rabeprazole  If you are able to, use Pepcid as this is safer for your kidneys   Try to exercise at least 30 minutes 3 days a week to begin with with an ultimate goal of 5 days a week for at least 30 minutes     Try to lose 5-10 lb by your next visit     Please do not drink more than 2 glasses of alcohol/wine on a daily basis as this may contribute to your high blood pressure

## 2022-02-15 NOTE — LETTER
February 15, 2022     MD Aga Solomon [address]  [address]  [facility]    Patient: Charity Wilkinson   YOB: 1950   Date of Visit: 2/15/2022       Dear Dr Melendez Plants: Thank you for referring Jorjeroyce Christina to me for evaluation  Below are my notes for this consultation  If you have questions, please do not hesitate to call me  I look forward to following your patient along with you  Sincerely,        Emmett Hernandez MD        CC: Carla Witt, MD Emmett Marina MD  2/15/2022 10:31 AM  Sign when Signing Visit  RENAL FOLLOW UP NOTE: td     ASSESSMENT AND PLAN:  1  Sampson Radames :  · Etiology:  Diabetic nephropathy/hypertensive nephrosclerosis/arteriolar nephrosclerosis/chronic NSAID use   No evidence of obstructive uropathy, renal artery disease or primary glomerular disease with a bland urinalysis  Of note, CPK was normal at 105 no evidence rhabdomyolysis    · Baseline creatinine:  1 5-2 0  · Current creatinine:  1 65 at baseline  · Urine protein creatinine ratio:  0 79 g at goal!!  · UA demonstrated no significant hematuria but 3+ proteinuria from  · Serologies:  Normal C3/C4; negative rheumatoid factor; negative SPEP:  Negative UPEP;  Light chain ratio 2 09 essentially normal for chronic kidney disease  negative EWELINA; negative hepatitis-C; normal IgA; normal ASO; negative RPR  Recommendations:  · Treat hypertension-please see below  · Treat dyslipidemia-please see below  · Maintain proteinuria less than 1 g or as low as possible  · Avoid nephrotoxic agents such as NSAIDs, patient counseled as such     2   Volume:   · Current status:  Euvolemic  · Torsemide dose:  Continue current dose  3   Hypertension:  Workup:  · saline suppression test for primary aldosterone state was normal  · plasma free metanephrines was negative  · renal artery duplex was negative 02/2019       Current blood pressure averages:   A m :   139/70, standing 130/70  P m :  142/74, standing 31/70  Heart rate:   60 range        · Goal blood pressure:  less than 130/80 given less than 1 g of proteinuria at this time  Recommendations:  · Push nonmedical regimen including weight loss, isotonic exercise and avoidance of salt; patient counseled as such  · Medication changes today:   · Trial as spironolactone 25 mg Monday Wednesday and Friday to try to gently decrease systolic readings and may help proteinuria  We have to be cognizant of the potassium  4   Electrolytes:  all acceptable including a potassium of 4 5:   5   Mineral bone disorder:  · Calcium/magnesium/phosphorus:  All acceptable, only borderline high magnesium at 2 3, avoid magnesium products  · PTH intact:  115 1 improved slightly elevated but will monitor for now  · Vitamin-D:  32  6   Dyslipidemia:  · Goal LDL:  Less than 100  · Current lipid profile:  LDL not able to calculate/HDL 33/triglycerides 416  Recommendations:  Per Endocrinology but essentially at goal  7   Anemia:   · Current hemoglobin  12 2 essentially normal  8   Other problems:  · Depression  · Diabetes mellitus per primary medical physician  · Hypothyroidism  · BARBARA on CPAP  · Fibromyalgia/osteoarthritis:  Patient with myoclonic movements, seems related to gabapentin it was adjusted with resolution    · Nephrolithiasis  · Basal cell/squamous CA of the skin  · Urinary stress incontinence  · Status post incisional hernia repairs  · Recent hospitalization as outlined below from severe GERD with hypoxemia and shortness of breath from a failed Nissen fundoplication from prior hiatal hernia repair  [facility] is due to have further testing with an EGD by GI and then subsequently thoracic surgery consultation for possible repair(however, according to the patient at this juncture she was felt I risk so no surgery is planned at this time)  In addition, she was placed on Protonix 40 b i d  and Pepcid 40 mg hs  · Hospitalization on 07/14/2020 secondary confusion at home   Apparently she had protracted hospital course in [place] following aspiration pneumonia   Ten days in the ICU on a ventilator   No overt infectious process   Low probability for pulmonary embolus despite an elevated D-dimer   Had mild leukocytosis/SIRS criteria subsequently discharged 1 day later when she clinically improved   Symptoms were felt secondary to her protracted ICU course  · Recent hospitalization with discharge on 05/18/21:  · Epigastric pain following an EGD on 05/13/2021 for Botox injection at the pylorus for treatment for gastroparesis   Apparently associated with chocolate ingestion and possible cough with aspiration  · Complicated by ANDRA with creatinine to 2 6 which improved with IV fluids TO 1 92 ON 05/18/2021        GI health maintenance:  Last colonoscopy:  According to the patient less than 5 years ago; GI has seen her and is planning a colonoscopy [facility])    PATIENT INSTRUCTIONS:    Patient Instructions   1  Medication changes today:   Please begin spironolactone 25 mg every Monday Wednesday Friday 3 days a week  Please watch for breast soreness or enlargement and call if you developed any  2  Please go for non fasting  lab work 1-2 weeks after making the above medication change      3  Please take 1 week a blood pressure readings  at least 4 weeks after making the above medication change     AS FOLLOWS  MORNING AND EVENING, SITTING AND STANDING as follows:  · TAKE THE MORNING READINGS BEFORE ANY MEDICATIONS AND WHEN YOU ARE RELAXED FOR SEVERAL MINUTES  · TAKE THE EVENING READINGS:  BETWEEN 7-10 P M ; PRIOR TO ANY MEDICATIONS; AT LEAST IN OUR  FROM DINNER; AND CERTAINLY AFTER RELAXING FOR A FEW MINUTES  · PLEASE INCLUDE HEART RATE WITH YOUR BLOOD PRESSURE READINGS  · When taking standing readings, keep your arm supported at heart level and not dangling  · Make sure you are sitting with your back supported and feet on the ground and do not cross your legs or feet  · Make sure you have not taken any coffee or caffeine products or exercised or smoke cigarettes at least 30 minutes before taking your blood pressure  Then please mail these readings into the office    4  Follow-up in 4  months   Please bring in 1 week a blood pressure readings morning evening, sitting and standing is outlined above   Please go for fasting lab work 1-2 weeks prior to your appointment      5  General instructions:   AVOID SALT BUT NOT ADDING AN READING LABELS TO MAKE SURE THERE IS LOW-SALT IN THE FOOD THAT YOU ARE EATING  o Goal is less than 2 g of sodium intake or less than 5 g of sodium chloride intake per day     Avoid nonsteroidal anti-inflammatory drugs such as Naprosyn, ibuprofen, Aleve, Advil, Celebrex, Meloxicam (Mobic) etc   You can use Tylenol as needed if you do not have any liver condition to be concerned about     Avoid medications such as Sudafed or decongestants and antihistamines that contained the D component which is the decongestant  You can take antihistamines without the decongestant or D component   Try to avoid medications such as pantoprazole or  Protonix/Nexium or Esomeprazole)/Prilosec or omeprazole/Prevacid or lansoprazole/AcipHex or Rabeprazole  If you are able to, use Pepcid as this is safer for your kidneys   Try to exercise at least 30 minutes 3 days a week to begin with with an ultimate goal of 5 days a week for at least 30 minutes     Try to lose 5-10 lb by your next visit     Please do not drink more than 2 glasses of alcohol/wine on a daily basis as this may contribute to your high blood pressure  Subjective: There has been no hospitalizations or acute illnesses since last visit  The patient overall is feeling well  She had an inspire device placed for sleep apnea with excellent results  No fevers, chills, or cough or colds    Good appetite and good energy  No hematuria, dysuria, voiding symptoms, occasional foamy urine unchanged  No gastrointestinal symptoms:  Except for some mild nausea couple times a week:  She has known gastroparesis had Botox she does followed by GI  No cardiovascular symptoms including swelling of the legs  No headaches, dizziness or lightheadedness  Blood pressure medications:   Torsemide 20 mg daily in the morning   Olmesartan 40 mg daily HS   Metoprolol tartrate 50 mg twice a day   Hydralazine 25 mg twice a day   Amlodipine 5 mg at dinner time      ROS:  See HPI, otherwise review of systems as completely reviewed with the patient are negative    Past Medical History:   Diagnosis Date    Allergic     Anesthesia     pt reports "had to use double lumen endo tube for hiatal hernia repair/so surgeon could get to where he needed to work"    Arthritis     Aspiration into airway     Basal cell carcinoma 2007    left cheek     BCC (basal cell carcinoma) 05/27/2021    Left Nasal tip    Cancer [facility] [address] [facility]     squamous cell cancer on forhead    Cancer ([facility] [address] )     basal cell on nose     Chronic kidney disease 2000, 2018    Stones, kidney disease stage 4    Chronic pain disorder     bilat feet and joint pain on occas    Colon polyp     COVID-19 08/2021    recovered at home/did receive monoclonal infusion    CPAP (continuous positive airway pressure) dependence     not using as has been recalled    Dental bridge present     Depression     Diabetes mellitus ([facility] [address] )     Type 2    Diabetic neuropathy ([facility] [address] [facility]     Disease of thyroid gland     Family history of thyroid problem     Fatty liver     GERD (gastroesophageal reflux disease)     Heart burn     History of pneumonia     Hyperlipidemia     Hyperplasia, parathyroid ([address] )     Hypertension     Kidney problem     Kidney stone     Motion sickness     Obesity (BMI 30 0-34  9)     Pollen allergies     RLS (restless legs syndrome)     SCC (squamous cell carcinoma) 05/04/2021    left mid forehead    Seasonal allergies     Sleep apnea     uses CPAP    Squamous cell skin cancer 2007    left cheek     Swollen ankles     Wears glasses      Past Surgical History:   Procedure Laterality Date    ARTHROSCOPY KNEE      BREAST BIOPSY      stereotactic-benign    BREAST BIOPSY      stereo-benign    BREAST EXCISIONAL BIOPSY      unknown date-benign    BREAST EXCISIONAL BIOPSY      unknown date-benign    BREAST EXCISIONAL BIOPSY      unknown date-benign    BREAST EXCISIONAL BIOPSY      unknown age-benign    CHOLECYSTECTOMY      COLONOSCOPY      EXAMINATION UNDER ANESTHESIA N/A 6/24/2021    Procedure: EXAM UNDER ANESTHESIA (EUA), DISE;  Surgeon: Nguyễn Morris MD;  [facility];  Service: ENT    HERNIA REPAIR      HIATAL HERNIA REPAIR      LIPOSUCTION      MOHS SURGERY  05/20/2021    left mid forehead-Gautam    MOHS SURGERY Left 05/27/2021    Left nasal tip- gautam     [facility] INCISION IMPLANT CRANIAL NERVE STIM ELECTRODE/PULSE GEN N/A 11/10/2021    Procedure: INSERTION UPPER AIRWAY STIMULATOR, INSPIRE IMPLANT;  Surgeon: Nguyễn Morris MD;  [facility];  Service: ENT    REDUCTION MAMMAPLASTY Bilateral 2000    REDUCTION MAMMAPLASTY      SKIN BIOPSY      SKIN CANCER EXCISION  2007    squamous cell carcinoma     SKIN CANCER EXCISION  2007    basal cell carcinoma    SQUAMOUS CELL CARCINOMA EXCISION      TOE SURGERY      TONSILLECTOMY      TUBAL LIGATION      UPPER GASTROINTESTINAL ENDOSCOPY       Family History   Problem Relation Age of Onset    Heart disease Mother     Depression Mother     Hypertension Mother    Jan Elmer COPD Mother    Jan Elmer Hearing loss Mother     Heart disease Father     Lung cancer Father 79        Smoker     Cancer Father         brain    Alcohol abuse Father     Dementia Father     Hypertension Father     Thyroid disease Father     COPD Father     Arthritis Father     Brain cancer Father 76    Hypertension Sister     Diabetes Sister     Heart disease Sister     Thyroid disease Sister     Cancer Sister         Lympoma  Lung cancer Sister 78    Hypertension Brother     Diabetes Brother     Cancer Brother         Throat    Dementia Brother     Stroke Brother     Hypertension Brother     No Known Problems Son     No Known Problems Son     Heart disease Brother     Diabetes Brother     Brain cancer Paternal Aunt         unknown age      reports that she quit smoking about 46 years ago  Her smoking use included cigarettes  She started smoking about 54 years ago  She has a 20 00 pack-year smoking history  She has never used smokeless tobacco  She reports current alcohol use  She reports that she does not use drugs  I COMPLETELY REVIEWED THE PAST MEDICAL HISTORY/PAST SURGICAL HISTORY/SOCIAL HISTORY/FAMILY HISTORY/AND MEDICATIONS  AND UPDATED ALL    Objective:     Vitals:   BP sitting on right:  134/68 with a heart rate of 60 and regular  BP standing on right:  130/64 with a heart rate of 60 and regular    Weight (last 2 days)     None        Wt Readings from Last 3 Encounters:   01/04/22 77 6 kg (171 lb)   12/22/21 77 7 kg (171 lb 3 2 oz)   11/16/21 76 2 kg (168 lb)       Body mass index is 30 29 kg/m²      Physical Exam: General:  No acute distress  Skin:  No acute rash  Eyes:  No scleral icterus, noninjected, no discharge from eyes  ENT:  Moist mucous membranes  Neck:  Supple, no jugular venous distention, trachea is midline, no lymphadenopathy and no thyromegaly  Back   No CVAT  Chest:  Clear to auscultation and percussion, good respiratory effort  CVS:  Regular rate and rhythm without a rub, or gallops or murmurs  Abdomen:  Obese,Soft and nontender with normal bowel sounds  Extremities:  No cyanosis and no edema, no arthritic changes, normal range of motion  Neuro:  Grossly intact  Psych:  Alert, oriented x3 and appropriate      Medications:    Current Outpatient Medications:     acetaminophen (TYLENOL) 500 mg tablet, Take 1,000 mg by mouth as needed , Disp: , Rfl:     amLODIPine (NORVASC) 5 mg tablet, TAKE 1 TABLET BY MOUTH EVERY DAY (Patient taking differently: every evening ), Disp: 90 tablet, Rfl: 3    b complex vitamins tablet, Take 1 tablet by mouth daily , Disp: , Rfl:     B-D ULTRAFINE III SHORT PEN 31G X 8 MM MISC, USE AS DIRECTED 4 TIMES PER DAY, Disp: , Rfl: 3    DULoxetine (Cymbalta) 30 mg delayed release capsule, Take 30 mg by mouth daily, Disp: , Rfl:     DULoxetine (CYMBALTA) 60 mg delayed release capsule, Take 90 mg by mouth daily, Disp: , Rfl:     famotidine (PEPCID) 40 MG tablet, Take 1 tablet (40 mg total) by mouth daily Take daily in am, Disp: 90 tablet, Rfl: 3    fluticasone (FLONASE) 50 mcg/act nasal spray, 1-2 sprays into each nostril daily, Disp: 18 2 mL, Rfl: 5    gabapentin (NEURONTIN) 100 mg capsule, Take 100 mg by mouth daily, Disp: , Rfl:     hydrALAZINE (APRESOLINE) 25 mg tablet, Take 1 tablet (25 mg total) by mouth 2 (two) times a day, Disp: 60 tablet, Rfl: 5    Icosapent Ethyl (Vascepa) 1 g CAPS, Take 1 g by mouth 2 (two) times a day, Disp: , Rfl:     insulin aspart (NovoLOG) 100 units/mL injection, Inject under the skin 3 (three) times a day before meals 14 units, 14 units, 18 units  , Disp: , Rfl:     insulin detemir (Levemir FlexTouch) 100 Units/mL injection pen, Inject 50 Units under the skin every evening , Disp: , Rfl:     levothyroxine 137 mcg tablet, Take 137 mcg by mouth, Disp: , Rfl:     metoprolol tartrate (LOPRESSOR) 50 mg tablet, Take 1 tablet (50 mg total) by mouth every 12 (twelve) hours, Disp: 180 tablet, Rfl: 3    Multiple Vitamin (multivitamin) capsule, Take 1 capsule by mouth daily, Disp: , Rfl:     olmesartan (BENICAR) 40 mg tablet, TAKE 1 TABLET BY MOUTH EVERY DAY, Disp: 90 tablet, Rfl: 3    pramipexole (MIRAPEX) 0 25 mg tablet, Take 1 tablet (0 25 mg total) by mouth daily at bedtime, Disp: 90 tablet, Rfl: 3    QUEtiapine (SEROquel) 25 mg tablet, Take 1 tablet (25 mg total) by mouth daily at bedtime, Disp: 30 tablet, Rfl: 5    rosuvastatin (CRESTOR) 10 MG tablet, Take 20 mg by mouth daily  , Disp: , Rfl:     torsemide (DEMADEX) 20 mg tablet, TAKE 1 TABLET BY MOUTH EVERY DAY, Disp: 90 tablet, Rfl: 3    Vitamin D, Ergocalciferol, 2000 units CAPS, Take 2,000 Units by mouth daily , Disp: , Rfl:     albuterol (Ventolin HFA) 90 mcg/act inhaler, Inhale 2 puffs every 6 (six) hours as needed for wheezing or shortness of breath (Patient not taking: Reported on 12/22/2021 ), Disp: , Rfl: 0    [START ON 2/16/2022] spironolactone (ALDACTONE) 25 mg tablet, Take 1 tablet (25 mg total) by mouth 3 (three) times a week, Disp: 15 tablet, Rfl: 5    Lab, Imaging and other studies: I have personally reviewed pertinent labs  Laboratory Results:  Results for orders placed or performed in visit on 01/06/22   Hemoglobin A1C   Result Value Ref Range    Hemoglobin A1C 6 8              Invalid input(s): ALBUMIN      Radiology review:   chest X-ray    Ultrasound      Portions of the record may have been created with voice recognition software  Occasional wrong word or "sound a like" substitutions may have occurred due to the inherent limitations of voice recognition software  Read the chart carefully and recognize, using context, where substitutions have occurred

## 2022-02-23 ENCOUNTER — OFFICE VISIT (OUTPATIENT)
Dept: GASTROENTEROLOGY | Facility: AMBULARY SURGERY CENTER | Age: 72
End: 2022-02-23
Payer: MEDICARE

## 2022-02-23 VITALS
SYSTOLIC BLOOD PRESSURE: 132 MMHG | OXYGEN SATURATION: 96 % | BODY MASS INDEX: 30.33 KG/M2 | HEART RATE: 60 BPM | DIASTOLIC BLOOD PRESSURE: 66 MMHG | HEIGHT: 63 IN | WEIGHT: 171.2 LBS

## 2022-02-23 DIAGNOSIS — K31.84 GASTROPARESIS DUE TO DM (HCC): ICD-10-CM

## 2022-02-23 DIAGNOSIS — Z86.010 HISTORY OF COLON POLYPS: ICD-10-CM

## 2022-02-23 DIAGNOSIS — K21.9 GASTROESOPHAGEAL REFLUX DISEASE, UNSPECIFIED WHETHER ESOPHAGITIS PRESENT: Primary | ICD-10-CM

## 2022-02-23 DIAGNOSIS — E11.43 GASTROPARESIS DUE TO DM (HCC): ICD-10-CM

## 2022-02-23 DIAGNOSIS — K59.09 CHRONIC CONSTIPATION: ICD-10-CM

## 2022-02-23 PROCEDURE — 99214 OFFICE O/P EST MOD 30 MIN: CPT | Performed by: PHYSICIAN ASSISTANT

## 2022-02-23 RX ORDER — ROSUVASTATIN CALCIUM 20 MG/1
TABLET, COATED ORAL
Status: ON HOLD | COMMUNITY
Start: 2022-01-13 | End: 2022-04-01 | Stop reason: SDUPTHER

## 2022-02-23 RX ORDER — FAMOTIDINE 20 MG/1
TABLET, FILM COATED ORAL
Qty: 270 TABLET | Refills: 1 | Status: SHIPPED | OUTPATIENT
Start: 2022-02-23 | End: 2022-04-01 | Stop reason: HOSPADM

## 2022-02-23 NOTE — PROGRESS NOTES
Assessment and Plan    #1  Chronic constipation: controlled with PRN miralax  -high fiber diet, good hydration  -continue miralax as needed   -call with any worsening    #2  Diabetic gastroparesis with GERD: over last few months having intermittent nausea, feeling like food gets stuck or regurgitates food, denies vomiting, had similar symptoms which improved after botox injection in May 2021  -plan for repeat botox injection at this time as she had over 6 months off benefit from this  -gastroparesis diet discussed, small frequent meals   -good glycemic control  -if botox does not improve symptoms, will need to discuss alternative options but she had refused reglan and other oral options in the past  -continue pepcid 40 mg in AM and 20 mg in PM    #3  History of colon polyps  -repeat in 3 years   --------------------------------------------------------------------------------------------------------------------    Chief Complaint: f/u gastroparesis    HPI: Peter Beauchamp is a 66 y o  female with a history of chronic constipation, gastroparesis, diabetes, GERD, COPD, CKD, anemia, and HLD who presents today for follow up for gastroparesis  She had botox injection in May 2021 for her gastroparesis which she reports improved her symptoms up until 1 month ago  She reports recently she had recurrence of stmproms with feeling full quickly, nausea, GERD, and dysphagia  She is on pepcid 40 mg in AM and 20 mg before dinner  She denies vomiting  She gets abdominal discomfort after eating  She reports constipation which is controlled with PRN miralax  She denies rectal bleeding or melena  She recently ahd a colonoscopy with polyps removed and is due for repeat in 3 years  Her diabetes is well controlled and she follows a gastroparesis diet         Review of Systems:   General: negative for fatigue, fever, night sweats or unexpected weight loss  Psychological: negative for anxiety or depression  Ophthalmic: negative for blurry vision or scleral icterus  ENT: negative for headaches, oral lesions, sore throat, vocal changes, +dysphagia  Hematological and Lymphatic: negative for pallor or swollen lymph nodes  Respiratory: negative for cough, shortness of breath or wheezing  Cardiovascular: negative for chest pain, edema or murmur  Gastrointestinal: as mentioned in HPI  Genito-Urinary: negative for dysuria or incontinence  Musculoskeletal: negative for joint pain, joint stiffness or joint swelling  Dermatological: negative for pruritus, rash, or jaundice    Current Medications  Current Outpatient Medications   Medication Sig Dispense Refill    acetaminophen (TYLENOL) 500 mg tablet Take 1,000 mg by mouth as needed       amLODIPine (NORVASC) 5 mg tablet TAKE 1 TABLET BY MOUTH EVERY DAY (Patient taking differently: every evening ) 90 tablet 3    b complex vitamins tablet Take 1 tablet by mouth daily       B-D ULTRAFINE III SHORT PEN 31G X 8 MM MISC USE AS DIRECTED 4 TIMES PER DAY  3    DULoxetine (Cymbalta) 30 mg delayed release capsule Take 30 mg by mouth daily      DULoxetine (CYMBALTA) 60 mg delayed release capsule Take 90 mg by mouth daily      fluticasone (FLONASE) 50 mcg/act nasal spray 1-2 sprays into each nostril daily 18 2 mL 5    gabapentin (NEURONTIN) 100 mg capsule Take 100 mg by mouth daily      hydrALAZINE (APRESOLINE) 25 mg tablet Take 1 tablet (25 mg total) by mouth 2 (two) times a day 60 tablet 5    Icosapent Ethyl (Vascepa) 1 g CAPS Take 1 g by mouth 2 (two) times a day      insulin aspart (NovoLOG) 100 units/mL injection Inject under the skin 3 (three) times a day before meals 14 units, 14 units, 18 units        insulin detemir (Levemir FlexTouch) 100 Units/mL injection pen Inject 50 Units under the skin every evening       levothyroxine 137 mcg tablet Take 137 mcg by mouth      metoprolol tartrate (LOPRESSOR) 50 mg tablet Take 1 tablet (50 mg total) by mouth every 12 (twelve) hours 180 tablet 3    Multiple Vitamin (multivitamin) capsule Take 1 capsule by mouth daily      olmesartan (BENICAR) 40 mg tablet TAKE 1 TABLET BY MOUTH EVERY DAY 90 tablet 3    pramipexole (MIRAPEX) 0 25 mg tablet Take 1 tablet (0 25 mg total) by mouth daily at bedtime 90 tablet 3    QUEtiapine (SEROquel) 25 mg tablet Take 1 tablet (25 mg total) by mouth daily at bedtime 30 tablet 5    rosuvastatin (CRESTOR) 20 MG tablet       spironolactone (ALDACTONE) 25 mg tablet Take 1 tablet (25 mg total) by mouth 3 (three) times a week 15 tablet 5    torsemide (DEMADEX) 20 mg tablet TAKE 1 TABLET BY MOUTH EVERY DAY 90 tablet 3    Vitamin D, Ergocalciferol, 2000 units CAPS Take 2,000 Units by mouth daily       albuterol (Ventolin HFA) 90 mcg/act inhaler Inhale 2 puffs every 6 (six) hours as needed for wheezing or shortness of breath (Patient not taking: Reported on 12/22/2021 )  0    famotidine (PEPCID) 20 mg tablet Take 2 tablets PO in AM and 1 tablet in PM  270 tablet 1    rosuvastatin (CRESTOR) 10 MG tablet Take 20 mg by mouth daily         No current facility-administered medications for this visit         Past Medical History  Past Medical History:   Diagnosis Date    Allergic     Anesthesia     pt reports "had to use double lumen endo tube for hiatal hernia repair/so surgeon could get to where he needed to work"    Arthritis     Aspiration into airway     Basal cell carcinoma 2007    left cheek     BCC (basal cell carcinoma) 05/27/2021    Left Nasal tip    Cancer ([facility] )     squamous cell cancer on forhead    Cancer ([facility] )     basal cell on nose     Chronic kidney disease 2000, 2018    Stones, kidney disease stage 4    Chronic pain disorder     bilat feet and joint pain on occas    Colon polyp     COVID-19 08/2021    recovered at home/did receive monoclonal infusion    CPAP (continuous positive airway pressure) dependence     not using as has been recalled    Dental bridge present     Depression     Diabetes mellitus ([facility] )     Type 2  Diabetic neuropathy (HCC)     Disease of thyroid gland     Family history of thyroid problem     Fatty liver     GERD (gastroesophageal reflux disease)     Heart burn     History of pneumonia     Hyperlipidemia     Hyperplasia, parathyroid ([address] )     Hypertension     Kidney problem     Kidney stone     Motion sickness     Obesity (BMI 30 0-34  9)     Pollen allergies     RLS (restless legs syndrome)     SCC (squamous cell carcinoma) 05/04/2021    left mid forehead    Seasonal allergies     Sleep apnea     uses CPAP    Squamous cell skin cancer 2007    left cheek     Swollen ankles     Wears glasses        Past Surgical History  Past Surgical History:   Procedure Laterality Date    ARTHROSCOPY KNEE      BREAST BIOPSY      stereotactic-benign    BREAST BIOPSY      stereo-benign    BREAST EXCISIONAL BIOPSY      unknown date-benign    BREAST EXCISIONAL BIOPSY      unknown date-benign    BREAST EXCISIONAL BIOPSY      unknown date-benign    BREAST EXCISIONAL BIOPSY      unknown age-benign    CHOLECYSTECTOMY      COLONOSCOPY      EXAMINATION UNDER ANESTHESIA N/A 6/24/2021    Procedure: EXAM UNDER ANESTHESIA (EUA), DISE;  Surgeon: Bakari Michelle MD;  Location: [facility];  Service: ENT    HERNIA REPAIR      HIATAL HERNIA REPAIR      LIPOSUCTION      MOHS SURGERY  05/20/2021    left mid forehead-Gautam    MOHS SURGERY Left 05/27/2021    Left nasal tip- gautam     KS INCISION IMPLANT CRANIAL NERVE STIM ELECTRODE/PULSE GEN N/A 11/10/2021    Procedure: INSERTION UPPER AIRWAY STIMULATOR, INSPIRE IMPLANT;  Surgeon: Bakari Michelle MD;  Location: [facility];  Service: ENT    REDUCTION MAMMAPLASTY Bilateral 2000    REDUCTION MAMMAPLASTY      SKIN BIOPSY      SKIN CANCER EXCISION  2007    squamous cell carcinoma     SKIN CANCER EXCISION  2007    basal cell carcinoma    SQUAMOUS CELL CARCINOMA EXCISION      TOE SURGERY      TONSILLECTOMY      TUBAL LIGATION      UPPER GASTROINTESTINAL ENDOSCOPY         Past Social History   Social History     Socioeconomic History    Marital status: /Civil Union     Spouse name: None    Number of children: None    Years of education: None    Highest education level: None   Occupational History    Occupation: RETIRED   Tobacco Use    Smoking status: Former Smoker     Packs/day: 2 00     Years: 10 00     Pack years: 20 00     Types: Cigarettes     Start date: 1967     Quit date:      Years since quittin 2    Smokeless tobacco: Never Used   Vaping Use    Vaping Use: Never used   Substance and Sexual Activity    Alcohol use:  Yes     Alcohol/week: 0 0 standard drinks     Comment: rarely a sip     Drug use: No    Sexual activity: None     Comment: defer   Other Topics Concern    None   Social History Narrative    None     Social Determinants of Health     Financial Resource Strain: Not on file   Food Insecurity: Not on file   Transportation Needs: Not on file   Physical Activity: Not on file   Stress: Not on file   Social Connections: Not on file   Intimate Partner Violence: Not on file   Housing Stability: Not on file       The following portions of the patient's history were reviewed and updated as appropriate: allergies, current medications, past family history, past medical history, past social history, past surgical history and problem list     Vital Signs  Vitals:    22 1135   BP: 132/66   Pulse: 60   SpO2: 96%   Weight: 77 7 kg (171 lb 3 2 oz)   Height: 5' 3" (1 6 m)       Physical Exam:  General appearance: alert, cooperative, no distress  HEENT: normocephalic, anicteric, no eye erythema or discharge, no oropharyngeal thrush  Neck: supple, trachea midline, no adenopathy  Lungs: CTA b/l, no rales, rhonchi, or wheezing, unlabored respirations  Heart: RRR, no murmur, rubs, or gallops  Abdomen: soft, obese abdomen, non-tender, non-distended, normal bowel sounds, no masses or organomegaly  Rectal: deferred  Extremities: no cyanosis, clubbing, or edema  Musculoskeletal: normal gait  Skin: color and texture normal, no jaundice, no rashes or lesions  Psychiatric: alert and oriented, normal affect and behavior

## 2022-02-24 NOTE — PATIENT INSTRUCTIONS
Scheduled date of EGDW/ BOTOX(as of today):5/20/2022  Physician performing EGD W/ BOTOX: DR FLOWER  [facility] of [facility]/ [facility]: [facility]  Instructions reviewed with patient by:DAVID SMITH  Clearances: DIABETIC

## 2022-03-01 ENCOUNTER — TELEPHONE (OUTPATIENT)
Dept: NEPHROLOGY | Facility: CLINIC | Age: 72
End: 2022-03-01

## 2022-03-01 NOTE — TELEPHONE ENCOUNTER
Spoke with patient letting her know her labs look good and there are no changes at this time  She expressed understanding and thanked us for the call

## 2022-03-01 NOTE — TELEPHONE ENCOUNTER
----- Message from Erik Mcduffie MD sent at 3/1/2022 10:43 AM EST -----  Labs look reasonably good no change

## 2022-03-02 DIAGNOSIS — N18.4 STAGE 4 CHRONIC KIDNEY DISEASE (HCC): ICD-10-CM

## 2022-03-02 DIAGNOSIS — I12.9 HYPERTENSIVE CHRONIC KIDNEY DISEASE WITH STAGE 1 THROUGH STAGE 4 CHRONIC KIDNEY DISEASE, OR UNSPECIFIED CHRONIC KIDNEY DISEASE: ICD-10-CM

## 2022-03-02 DIAGNOSIS — Z79.4 TYPE 2 DIABETES MELLITUS WITH CHRONIC KIDNEY DISEASE, WITH LONG-TERM CURRENT USE OF INSULIN, UNSPECIFIED CKD STAGE (HCC): ICD-10-CM

## 2022-03-02 DIAGNOSIS — E55.9 VITAMIN D DEFICIENCY: ICD-10-CM

## 2022-03-02 DIAGNOSIS — E11.22 TYPE 2 DIABETES MELLITUS WITH CHRONIC KIDNEY DISEASE, WITH LONG-TERM CURRENT USE OF INSULIN, UNSPECIFIED CKD STAGE (HCC): ICD-10-CM

## 2022-03-02 DIAGNOSIS — R80.1 PERSISTENT PROTEINURIA: ICD-10-CM

## 2022-03-02 DIAGNOSIS — N18.4 ANEMIA IN STAGE 4 CHRONIC KIDNEY DISEASE (HCC): ICD-10-CM

## 2022-03-02 DIAGNOSIS — E78.5 DYSLIPIDEMIA: ICD-10-CM

## 2022-03-02 DIAGNOSIS — D63.1 ANEMIA IN STAGE 4 CHRONIC KIDNEY DISEASE (HCC): ICD-10-CM

## 2022-03-02 RX ORDER — HYDRALAZINE HYDROCHLORIDE 25 MG/1
TABLET, FILM COATED ORAL
Qty: 180 TABLET | Refills: 1 | Status: SHIPPED | OUTPATIENT
Start: 2022-03-02 | End: 2022-04-14 | Stop reason: HOSPADM

## 2022-03-22 ENCOUNTER — OFFICE VISIT (OUTPATIENT)
Dept: DERMATOLOGY | Facility: CLINIC | Age: 72
End: 2022-03-22

## 2022-03-22 VITALS — TEMPERATURE: 97.3 F | WEIGHT: 166 LBS | BODY MASS INDEX: 29.41 KG/M2 | HEIGHT: 63 IN

## 2022-03-22 DIAGNOSIS — D22.5 MULTIPLE BENIGN MELANOCYTIC NEVI OF UPPER AND LOWER EXTREMITIES AND TRUNK: Primary | ICD-10-CM

## 2022-03-22 DIAGNOSIS — D22.60 MULTIPLE BENIGN MELANOCYTIC NEVI OF UPPER AND LOWER EXTREMITIES AND TRUNK: Primary | ICD-10-CM

## 2022-03-22 DIAGNOSIS — D22.70 MULTIPLE BENIGN MELANOCYTIC NEVI OF UPPER AND LOWER EXTREMITIES AND TRUNK: Primary | ICD-10-CM

## 2022-03-22 DIAGNOSIS — D18.01 CHERRY ANGIOMA: ICD-10-CM

## 2022-03-22 DIAGNOSIS — L85.3 XEROSIS OF SKIN: ICD-10-CM

## 2022-03-22 DIAGNOSIS — L82.1 SEBORRHEIC KERATOSIS: ICD-10-CM

## 2022-03-22 DIAGNOSIS — Z85.828 HISTORY OF BASAL CELL CARCINOMA: ICD-10-CM

## 2022-03-22 DIAGNOSIS — Z85.89 HISTORY OF SQUAMOUS CELL CARCINOMA: ICD-10-CM

## 2022-03-22 NOTE — PROGRESS NOTES
[facility] Follow Up Note    Patient Name: Schuyler Vargas  Encounter Date: 03/22/22      Today's Chief Concerns:   Concern #1:  Follow up       Current Medications:    Current Outpatient Medications:     acetaminophen (TYLENOL) 500 mg tablet, Take 1,000 mg by mouth as needed , Disp: , Rfl:     albuterol (Ventolin HFA) 90 mcg/act inhaler, Inhale 2 puffs every 6 (six) hours as needed for wheezing or shortness of breath (Patient not taking: Reported on 12/22/2021 ), Disp: , Rfl: 0    amLODIPine (NORVASC) 5 mg tablet, TAKE 1 TABLET BY MOUTH EVERY DAY (Patient taking differently: every evening ), Disp: 90 tablet, Rfl: 3    b complex vitamins tablet, Take 1 tablet by mouth daily , Disp: , Rfl:     B-D ULTRAFINE III SHORT PEN 31G X 8 MM MISC, USE AS DIRECTED 4 TIMES PER DAY, Disp: , Rfl: 3    DULoxetine (Cymbalta) 30 mg delayed release capsule, Take 30 mg by mouth daily, Disp: , Rfl:     DULoxetine (CYMBALTA) 60 mg delayed release capsule, Take 90 mg by mouth daily, Disp: , Rfl:     famotidine (PEPCID) 20 mg tablet, Take 2 tablets PO in AM and 1 tablet in PM , Disp: 270 tablet, Rfl: 1    fluticasone (FLONASE) 50 mcg/act nasal spray, 1-2 sprays into each nostril daily, Disp: 18 2 mL, Rfl: 5    gabapentin (NEURONTIN) 100 mg capsule, Take 100 mg by mouth daily, Disp: , Rfl:     hydrALAZINE (APRESOLINE) 25 mg tablet, TAKE 1 TABLET BY MOUTH TWICE A DAY, Disp: 180 tablet, Rfl: 1    Icosapent Ethyl (Vascepa) 1 g CAPS, Take 1 g by mouth 2 (two) times a day, Disp: , Rfl:     insulin aspart (NovoLOG) 100 units/mL injection, Inject under the skin 3 (three) times a day before meals 14 units, 14 units, 18 units  , Disp: , Rfl:     insulin detemir (Levemir FlexTouch) 100 Units/mL injection pen, Inject 50 Units under the skin every evening , Disp: , Rfl:     levothyroxine 137 mcg tablet, Take 137 mcg by mouth, Disp: , Rfl:     metoprolol tartrate (LOPRESSOR) 50 mg tablet, Take 1 tablet (50 mg total) by mouth every 12 (twelve) hours, Disp: 180 tablet, Rfl: 3    Multiple Vitamin (multivitamin) capsule, Take 1 capsule by mouth daily, Disp: , Rfl:     olmesartan (BENICAR) 40 mg tablet, TAKE 1 TABLET BY MOUTH EVERY DAY, Disp: 90 tablet, Rfl: 3    pramipexole (MIRAPEX) 0 25 mg tablet, Take 1 tablet (0 25 mg total) by mouth daily at bedtime, Disp: 90 tablet, Rfl: 3    QUEtiapine (SEROquel) 25 mg tablet, Take 1 tablet (25 mg total) by mouth daily at bedtime, Disp: 30 tablet, Rfl: 5    rosuvastatin (CRESTOR) 10 MG tablet, Take 20 mg by mouth daily  , Disp: , Rfl:     rosuvastatin (CRESTOR) 20 MG tablet, , Disp: , Rfl:     spironolactone (ALDACTONE) 25 mg tablet, Take 1 tablet (25 mg total) by mouth 3 (three) times a week, Disp: 15 tablet, Rfl: 5    torsemide (DEMADEX) 20 mg tablet, TAKE 1 TABLET BY MOUTH EVERY DAY, Disp: 90 tablet, Rfl: 3    Vitamin D, Ergocalciferol, 2000 units CAPS, Take 2,000 Units by mouth daily , Disp: , Rfl:     CONSTITUTIONAL:   Vitals:    03/22/22 1006   Temp: (!) 97 3 °F (36 3 °C)   Weight: 75 3 kg (166 lb)   Height: 5' 3" (1 6 m)         Specific Alerts:    Have you been seen by a [facility] Dermatologist in the last 3 years? YES    Are you pregnant or planning to become pregnant? No    Are you currently or planning to be nursing or breast feeding? No    Allergies   Allergen Reactions    Ciprofloxacin Hives and Itching       May we call your Preferred Phone number to discuss your specific medical information? YES    May we leave a detailed message that includes your specific medical information? YES    Have you traveled outside of the [facility] in the past 3 months? No    Do you currently have a pacemaker or defibrillator? YES ; Sleep apnea device     Do you have any artificial heart valves, joints, plates, screws, rods, stents, pins, etc? No    Do you require any medications prior to a surgical procedure?  No    Are you taking any medications that cause you to bleed more easily ("blood thinners") No    Have you ever experienced a rapid heartbeat with epinephrine? No      Review of Systems:  Have you recently had or currently have any of the following?     · Fever or chills: No  · Night Sweats: No  · Headaches: No  · Weight Gain: No  · Weight Loss: No  · Blurry Vision: No  · Nausea: No  · Vomiting: No  · Diarrhea: No  · Blood in Stool: No  · Abdominal Pain: No  · Itchy Skin: YES  · Painful Joints: YES  · Swollen Joints: No  · Muscle Pain: YES  · Irregular Mole: No  · Sun Burn: No  · Dry Skin: YES  · Skin Color Changes: No  · Scar or Keloid: No  · Cold Sores/Fever Blisters: No  · Bacterial Infections/MRSA: No  · Anxiety: YES  · Depression: YES  · Suicidal or Homicidal Thoughts: No      PSYCH: Normal mood and affect  EYES: Normal conjunctiva  ENT: Normal lips and oral mucosa  CARDIOVASCULAR: No edema  RESPIRATORY: Normal respirations  HEME/LYMPH/IMMUNO:  No regional lymphadenopathy except as noted below in ASSESSMENT AND PLAN BY DIAGNOSIS    FULL ORGAN SYSTEM SKIN EXAM (SKIN)   Hair, Scalp, Ears, Face Normal except as noted below in Assessment   Neck, Cervical Chain Nodes Normal except as noted below in Assessment   Right Arm/Hand/Fingers Normal except as noted below in Assessment   Left Arm/Hand/Fingers Normal except as noted below in Assessment   Chest/Breasts/Axillae Viewed areas Normal except as noted below in Assessment   Abdomen, Umbilicus Normal except as noted below in Assessment   Back/Spine Normal except as noted below in Assessment   Groin/Genitalia/Buttocks Viewed areas Normal except as noted below in Assessment   Right Leg, Foot, Toes Normal except as noted below in Assessment   Left Leg, Foot, Toes Normal except as noted below in Assessment       HISTORY OF BASAL CELL CARCINOMA    Physical Exam:  · Anatomic Location Affected:   Left nasal tip, left cheek   Morphological Description of scar:  Well healed scar   Suspected Recurrence: No   Pertinent Positives:   Pertinent Negatives: Additional History of Present Condition:  History of basal cell carcinoma with no sign of recurrence    Assessment and Plan:  Based on a thorough discussion of this condition and the management approach to it (including a comprehensive discussion of the known risks, side effects and potential benefits of treatment), the patient (family) agrees to implement the following specific plan:   Continue routine skin exams yearly    When outside we recommend using a wide brim hat, sunglasses, long sleeve and pants, sunscreen with SPF 02+ with reapplication every 2 hours, or SPF specific clothing    Discussed scheduling a laser consult once Dr Mesha Braun returns if graft area continues to be bothersome  How can basal cell carcinoma be prevented? The most important way to prevent BCC is to avoid sunburn  This is especially important in childhood and early life  Fair skinned individuals and those with a personal or family history of BCC should protect their skin from sun exposure daily, year-round and lifelong   Stay indoors or under the shade in the middle of the day    Wear covering clothing    Apply high protection factor SPF50+ broad-spectrum sunscreens generously to exposed skin if outdoors    Avoid indoor tanning (sun beds, solaria)   Oral nicotinamide (vitamin B3) in a dose of 500 mg twice daily may reduce the number and severity of BCCs  What is the outlook for basal cell carcinoma? Most BCCs are cured by treatment  Cure is most likely if treatment is undertaken when the lesion is small  About 50% of people with BCC develop a second one within 3 years of the first  They are also at increased risk of other skin cancers, especially melanoma  Regular self-skin examinations and long-term annual skin checks by an experienced health professional are recommended      HISTORY OF SQUAMOUS CELL CARCINOMA     Physical Exam:  · Anatomic Location Affected:   Left mid forehead, left cheek   Morphological Description of Scar:  Well healed scar   Suspected Recurrence: no   Regional adenopathy: no    Additional History of Present Condition:  Patient is present for routine skin exam     Assessment and Plan:  Based on a thorough discussion of this condition and the management approach to it (including a comprehensive discussion of the known risks, side effects and potential benefits of treatment), the patient (family) agrees to implement the following specific plan:   Continue routine yearly skin exam   When outside we recommend using a wide brim hat, sunglasses, long sleeve and pants, sunscreen with SPF 50+ with reapplication every 2 hours, or SPF specific clothing     How can SCC be prevented? The most important way to prevent SCC is to avoid sunburn  This is especially important in childhood and early life  Fair skinned individuals and those with a personal or family history of BCC should protect their skin from sun exposure daily, year-round and lifelong   Stay indoors or under the shade in the middle of the day    Wear covering clothing    Apply high protection factor SPF50+ broad-spectrum sunscreens generously to exposed skin if outdoors    Avoid indoor tanning (sun beds, solaria)      What is the outlook for SCC? Most SCC are cured by treatment  Cure is most likely if treatment is undertaken when the lesion is small  A small percent of SCC's can spread to lymph  nodes and long term monitoring is indicated  They are also at increased risk of other skin cancers, especially melanoma  Regular self-skin examinations and long-term annual skin checks by an experienced health professional are recommended        SEBORRHEIC KERATOSIS; NON-INFLAMED    Physical Exam:   Anatomic Location Affected:  Face, trunk and extremities ; left cheek   Morphological Description:  Flat and raised, waxy, smooth to warty textured, yellow to brownish-grey to dark brown to blackish, discrete, "stuck-on" appearing papules   Pertinent Positives:   Pertinent Negatives: Additional History of Present Condition:  Patient reports new bumps on the skin  Denies itch, burn, pain, bleeding or ulceration  Present constantly; nothing seems to make it worse or better  No prior treatment  Assessment and Plan:  Based on a thorough discussion of this condition and the management approach to it (including a comprehensive discussion of the known risks, side effects and potential benefits of treatment), the patient (family) agrees to implement the following specific plan:   Reassured benign    Left cheek benign growth; can be removed via shaved excision for $250 since per insurance not covered and its considered cosmetic     Seborrheic Keratosis  A seborrheic keratosis is a harmless warty spot that appears during adult life as a common sign of skin aging  Seborrheic keratoses can arise on any area of skin, covered or uncovered, with the usual exception of the palms and soles  They do not arise from mucous membranes  Seborrheic keratoses can have highly variable appearance  Seborrheic keratoses are extremely common  It has been estimated that over 90% of adults over the age of 61 years have one or more of them  They occur in males and females of all races, typically beginning to erupt in the 35s or 45s  They are uncommon under the age of 21 years  The precise cause of seborrhoeic keratoses is not known  Seborrhoeic keratoses are considered degenerative in nature  As time goes by, seborrheic keratoses tend to become more numerous  Some people inherit a tendency to develop a very large number of them; some people may have hundreds of them  The name "seborrheic keratosis" is misleading, because these lesions are not limited to a seborrhoeic distribution (scalp, mid-face, chest, upper back), nor are they formed from sebaceous glands, nor are they associated with sebum -- which is greasy    Seborrheic keratosis may also be called "SK," "Seb K," "basal cell papilloma," "senile wart," or "barnacle "      Researchers have noted:   Eruptive seborrhoeic keratoses can follow sunburn or dermatitis   Skin friction may be the reason they appear in body folds   Viral cause (e g , human papillomavirus) seems unlikely   Stable and clonal mutations or activation of FRFR3, PIK3CA, VANCE, AKT1 and EGFR genes are found in seborrhoeic keratoses   Seborrhoeic keratosis can arise from solar lentigo   FRFR3 mutations also arise in solar lentigines  These mutations are associated with increased age and location on the head and neck, suggesting a role of ultraviolet radiation in these lesions   Seborrheic keratoses do not harbour tumour suppressor gene mutations   Epidermal growth factor receptor inhibitors, which are used to treat some cancers, often result in an increase in verrucal (warty) keratoses  There is no easy way to remove multiple lesions on a single occasion  Unless a specific lesion is "inflamed" and is causing pain or stinging/burning or is bleeding, most insurance companies do not authorize treatment  ACTINIC KERATOSIS    Physical Exam:   Anatomic Location Affected:  Left wrist    Morphological Description:  Scaly pink papules     Additional History of Present Condition:  Discovered upon exam     Assessment and Plan:  Based on a thorough discussion of this condition and the management approach to it (including a comprehensive discussion of the known risks, side effects and potential benefits of treatment), the patient (family) agrees to implement the following specific plan:     Cryotherapy preformed today normal to have redness, irritation, blister may develop  Do not pop  Apply Vaseline to help with healing       Actinic keratoses are very common on sites repeatedly exposed to the sun, especially the backs of the hands and the face, most often affecting the ears, nose, cheeks, upper lip, vermilion of the lower lip, temples, forehead and balding scalp  In severely chronically sun-damaged individuals, they may also be found on the upper trunk, upper and lower limbs, and dorsum of feet  We discussed the theoretical premalignant (pre-cancerous) nature and etiology of these growths  We discussed the prevailing notion that actinic keratoses are a reflection of abnormal skin cell development due to DNA damage by short wavelength UVB  They are more likely to appear if the immune function is poor, due to aging, recent sun exposure, predisposing disease or certain drugs  We discussed that the main concern is that actinic keratoses may predispose to squamous cell carcinoma  It is rare for a solitary actinic keratosis to evolve to squamous cell carcinoma (SCC), but the risk of SCC occurring at some stage in a patient with more than 10 actinic keratoses is thought to be about 10 to 15%  A tender, thickened, ulcerated or enlarging actinic keratosis is suspicious of SCC  Actinic keratoses may be prevented by strict sun protection  If already present, keratoses may improve with a very high sun protection factor (50+) broad-spectrum sunscreen applied at least daily to affected areas, year-round  We recommend that UPF-rated clothing and hats and sunglasses be worn whenever possible and that a sunscreen-moisturizer combination product such as Neutrogena Daily Defense be applied at least three times a day  We performed a thorough discussion of treatment options and specific risk/benefits/alternatives including but not limited to medical field treatment with medications such as the following:     Topical field area medications such as 5-fluorouracil or Aldara (specifically, the trouble with long-term compliance, blistering and local skin reaction versus the convenience of at-home therapy and that field therapy gets what is not yet seen)       Cryotherapy (specifically, local pain, scarring, dyspigmentation, blistering, possible superinfection, and treats only what we see versus directed treatment today)   Photodynamic therapy (specifically, local pain, scarring, dyspigmentation, blistering, possible superinfection, need to schedule for a later date, and time spent in the office versus field therapy that gets what is not yet seen)  PROCEDURE:  DESTRUCTION OF PRE-MALIGNANT LESIONS  After a thorough discussion of treatment options and risk/benefits/alternatives (including but not limited to local pain, scarring, dyspigmentation, blistering, and possible superinfection), verbal and written consent were obtained and the aforementioned lesions were treated on with cryotherapy using liquid nitrogen x 1 cycle for 5-10 seconds   TOTAL NUMBER of 2 pre-malignant lesions were treated today on the ANATOMIC LOCATION: left wrist       The patient tolerated the procedure well, and after-care instructions were provided  MELANOCYTIC NEVI ("Moles")    Physical Exam:   Anatomic Location Affected:   Mostly on sun-exposed areas of the Face, trunk and extremities   Morphological Description:  Scattered, 1-4mm round to ovoid, symmetrical-appearing, even bordered, skin colored to dark brown macules/papules, mostly in sun-exposed areas   Pertinent Positives:   Pertinent Negatives:     Additional History of Present Condition:  Discovered during skin exam     Assessment and Plan:  Based on a thorough discussion of this condition and the management approach to it (including a comprehensive discussion of the known risks, side effects and potential benefits of treatment), the patient (family) agrees to implement the following specific plan:   Monitor for changes   When outside we recommend using a wide brim hat, sunglasses, long sleeve and pants, sunscreen with SPF 00+ with reapplication every 2 hours, or SPF specific clothing    Continue routine yearly skin exam      Melanocytic Nevi  Melanocytic nevi ("moles") are tan or brown, raised or flat areas of the skin which have an increased number of melanocytes  Melanocytes are the cells in our body which make pigment and account for skin color  Some moles are present at birth (I e , "congenital nevi"), while others come up later in life (i e , "acquired nevi")  The sun can stimulate the body to make more moles  Sunburns are not the only thing that triggers more moles  Chronic sun exposure can do it too  Clinically distinguishing a healthy mole from melanoma may be difficult, even for experienced dermatologists  The "ABCDE's" of moles have been suggested as a means of helping to alert a person to a suspicious mole and the possible increased risk of melanoma  The suggestions for raising alert are as follows:    Asymmetry: Healthy moles tend to be symmetric, while melanomas are often asymmetric  Asymmetry means if you draw a line through the mole, the two halves do not match in color, size, shape, or surface texture  Asymmetry can be a result of rapid enlargement of a mole, the development of a raised area on a previously flat lesion, scaling, ulceration, bleeding or scabbing within the mole  Any mole that starts to demonstrate "asymmetry" should be examined promptly by a board certified dermatologist      Border: Healthy moles tend to have discrete, even borders  The border of a melanoma often blends into the normal skin and does not sharply delineate the mole from normal skin  Any mole that starts to demonstrate "uneven borders" should be examined promptly by a board certified dermatologist      Color: Healthy moles tend to be one color throughout  Melanomas tend to be made up of different colors ranging from dark black, blue, white, or red  Any mole that demonstrates a color change should be examined promptly by a board certified dermatologist      Diameter: Healthy moles tend to be smaller than 0 6 cm in size; an exception are "congenital nevi" that can be larger  Melanomas tend to grow and can often be greater than 0 6 cm (1/4 of an inch, or the size of a pencil eraser)  This is only a guideline, and many normal moles may be larger than 0 6 cm without being unhealthy  Any mole that starts to change in size (small to bigger or bigger to smaller) should be examined promptly by a board certified dermatologist      Evolving: Healthy moles tend to "stay the same "  Melanomas may often show signs of change or evolution such as a change in size, shape, color, or elevation  Any mole that starts to itch, bleed, crust, burn, hurt, or ulcerate or demonstrate a change or evolution should be examined promptly by a board certified dermatologist       Dysplastic Nevi  Dysplastic moles are moles that fit the ABCDE rules of melanoma but are not identified as melanomas when examined under the microscope  They may indicate an increased risk of melanoma in that person  If there is a family history of melanoma, most experts agree that the person may be at an increased risk for developing a melanoma  Experts still do not agree on what dysplastic moles mean in patients without a personal or family history of melanoma  Dysplastic moles are usually larger than common moles and have different colors within it with irregular borders  The appearance can be very similar to a melanoma  Biopsies of dysplastic moles may show abnormalities which are different from a regular mole  Melanoma  Malignant melanoma is a type of skin cancer that can be deadly if it spreads throughout the body  The incidence of melanoma in the [place] is growing faster than any other cancer  Melanoma usually grows near the surface of the skin for a period of time, and then begins to grow deeper into the skin  Once it grows deeper into the skin, the risk of spread to other organs greatly increases   Therefore, early detection and removal of a malignant melanoma may result in a better chance at a complete cure; removal after the tumor has spread may not be as effective, leading to worse clinical outcomes such as death  The true rate of nevus transformation into a melanoma is unknown  It has been estimated that the lifetime risk for any acquired melanocytic nevus on any 21year-old individual transforming into melanoma by age [de-identified] is 0 03% (1 in 3,164) for men and 0 009% (1 in 10,800) for women  The appearance of a "new mole" remains one of the most reliable methods for identifying a malignant melanoma  Occasionally, melanomas appear as rapidly growing, blue-black, dome-shaped bumps within a previous mole or previous area of normal skin  Other times, melanomas are suspected when a mole suddenly appears or changes  Itching, burning, or pain in a pigmented lesion should increase suspicion, but most patients with early melanoma have no skin discomfort whatsoever  Melanoma can occur anywhere on the skin, including areas that are difficult for self-examination  Many melanomas are first noticed by other family members  Suspicious-looking moles may be removed for microscopic examination  You may be able to prevent death from melanoma by doing two simple things:    1  Try to avoid unnecessary sun exposure and protect your skin when it is exposed to the sun  People who live near the equator, people who have intermittent exposures to large amounts of sun, and people who have had sunburns in childhood or adolescence have an increased risk for melanoma  Sun sense and vigilant sun protection may be keys to helping to prevent melanoma  We recommend wearing UPF-rated sun protective clothing and sunglasses whenever possible and applying a moisturizer-sunscreen combination product (SPF 50+) such as Neutrogena Daily Defense to sun exposed areas of skin at least three times a day  2  Have your moles regularly examined by a board certified dermatologist AND by yourself or a family member/friend at home    We recommend that you have your moles examined at least once a year by a board certified dermatologist   Use your birthday as an annual reminder to have your "Birthday Suit" (I e , your skin) examined; it is a nice birthday gift to yourself to know that your skin is healthy appearing! Additionally, at-home self examinations may be helpful for detecting a possible melanoma  Use the ABCDEs we discussed and check your moles once a month at home  CHERRY ANGIOMAS    Physical Exam:   Anatomic Location Affected:  Trunk and extremities   Morphological Description:  Scattered cherry red, 1-4 mm papules   Pertinent Positives:   Pertinent Negatives: Additional History of Present Condition:  Discovered upon skin exam     Assessment and Plan:  Based on a thorough discussion of this condition and the management approach to it (including a comprehensive discussion of the known risks, side effects and potential benefits of treatment), the patient (family) agrees to implement the following specific plan:   Reassured benign     Assessment and Plan:    Cherry angioma, also known as Mami Sorrel spots, are benign vascular skin lesions  A "cherry angioma" is a firm red, blue or purple papule, 0 1-1 cm in diameter  When thrombosed, they can appear black in colour until evaluated with a dermatoscope when the red or purple colour is more easily seen  Cherry angioma may develop on any part of the body but most often appear on the scalp, face, lips and trunk  An angioma is due to proliferating endothelial cells; these are the cells that line the inside of a blood vessel  Angiomas can arise in early life or later in life; the most common type of angioma is a cherry angioma  Cherry angiomas are very common in males and females of any age or race  They are more noticeable in white skin than in skin of colour  They markedly increase in number from about the age of 36  There may be a family history of similar lesions   Eruptive cherry angiomas have been rarely reported to be associated with internal malignancy  The cause of angiomas is unknown  Genetic analysis of cherry angiomas has shown that they frequently carry specific somatic missense mutations in the GNAQ and GNA11 (Q209H) genes, which are involved in other vascular and melanocytic proliferations  Cherry angioma is usually diagnosed clinically and no investigations are necessary for the majority of lesions  It has a characteristic red-clod or lobular pattern on dermatoscopy (called lacunar pattern using conventional pattern analysis)  When there is uncertainty about the diagnosis, a biopsy may be performed  The angioma is composed of venules in a thickened papillary dermis  Collagen bundles may be prominent between the lobules  Cherry angiomas are harmless, so they do not usually have to be treated  Occasionally, they are removed to exclude a malignant skin lesion such as a nodular melanoma or because they are irritated or bleeding (and a subsequent risk for infection)  To decrease friction over the lesions, we recommend Neutrogena Daily Defense SPF 50+ at least 3 times a day  XEROSIS ("DRY SKIN")    Physical Exam:   Anatomic Location Affected: Body    Morphological Description:  Normal dry skin    Pertinent Positives:   Pertinent Negatives: Additional History of Present Condition:  Discovered upon skin exam     Assessment and Plan:  Based on a thorough discussion of this condition and the management approach to it (including a comprehensive discussion of the known risks, side effects and potential benefits of treatment), the patient (family) agrees to implement the following specific plan: Advise gentle skin care as follows: Shower with lukewarm water less than 10 minutes   Use Dove unscented soap to groin and armpits and neck        Pat dry after shower  Do not harshly rub     Immediately moisturize with heavy emollient   BEST - OINTMENTS, such as Vaseline, Aquaphor, Cerave healing ointment   BETTER - CREAMS, such as Cerave, Cetaphil, VaniCREAM, Aveeno, Eucerin  AVOID LOTIONS, too thin, most things in pump         Moisturize twice a day  Dry skin refers to skin that feels dry to touch  Dry skin has a dull surface with a rough, scaly quality  The skin is less pliable and cracked  When dryness is severe, the skin may become inflamed and fissured  Although any body site can be dry, dry skin tends to affect the shins more than any other site  Dry skin is lacking moisture in the outer horny cell layer (stratum corneum) and this results in cracks in the skin surface  Dry skin is also called xerosis, xeroderma or asteatosis (lack of fat)  It can affect males and females of all ages  There is some racial variability in water and lipid content of the skin   Dry skin that starts in early childhood may be one of about 20 types of ichthyosis (fish-scale skin)  There is often a family history of dry skin   Dry skin is commonly seen in people with atopic dermatitis   Nearly everyone > 60 years has dry skin  Dry skin that begins later may be seen in people with certain diseases and conditions   Postmenopausal women   Hypothyroidism   Chronic renal disease    Malnutrition and weight loss    Subclinical dermatitis    Treatment with certain drugs such as oral retinoids, diuretics and epidermal growth factor receptor inhibitors    People exposed to a dry environment may experience dry skin   Low humidity: in desert climates or cool, windy conditions    Excessive air conditioning    Direct heat from a fire or fan heater    Excessive bathing    Contact with soap, detergents and solvents    Inappropriate topical agents such as alcohol    Frictional irritation from rough clothing or abrasives    Dry skin is due to abnormalities in the integrity of the barrier function of the stratum corneum, which is made up of corneocytes     There is an overall reduction in the lipids in the stratum corneum   Ratio of ceramides, cholesterol and free fatty acids may be normal or altered   There may be a reduction in the proliferation of keratinocytes   Keratinocyte subtypes change in dry skin with a decrease in keratins K1, K10 and increase in K5, K14     Involucrin (a protein) may be expressed early, increasing cell stiffness   The result is retention of corneocytes and reduced water-holding capacity  The inherited forms of ichthyosis are due to loss of function mutations in various genes (listed in parentheses below)  The clinical features of ichthyosis depend on the specific type of ichthyosis:   Ichthyosis vulgaris (FLG)   Recessive X-linked ichthyosis (STS)    Autosomal recessive congenital ichthyosis (ABCA12, TGM1, ALOXE3)    Keratinopathic ichthyoses (KRT1, KRT10, KRT2)    Acquired ichthyosis may be due to:   Metabolic factors: thyroid deficiency    Illness: lymphoma, internal malignancy, sarcoidosis, HIV infection    Drugs: nicotinic acid, kava, protein kinase inhibitors (eg EGFR inhibitors), hydroxyurea  Complications of dry skin:  Dry areas of skin may become itchy, indicating a form of eczema/dermatitis has developed   Atopic eczema -- especially in people with ichthyosis vulgaris    Eczema craquelé -- especially in elderly people  Also called asteatotic eczema    A dry form of nummular dermatitis/discoid eczema -- especially in people that wash their skin excessively  When the dry skin of an elderly person is itchy without a visible rash, it is sometimes called winter itch, 7th age itch, senile pruritus or chronic pruritus of the elderly      Other complications of dry skin may include:   Skin infection when bacteria or viruses penetrate a break in the skin surface    Overheating, especially in some forms of ichthyosis    Food allergy, eg, to peanuts, has been associated with filaggrin mutations    Contact allergy, eg, to nickel, has also been correlated with barrier function defects  How is the type of dry skin diagnosed? The type of dry skin is diagnosed by careful history and examination  In children:   Family history    Age of onset    Appearance at birth, if known    Distribution of dry skin    Other features, eg eczema, abnormal nails, hair, dentition, sight, hearing  In adults:   Medical history    Medications and topical preparations    Bathing frequency and use of soap    Evaluation of environmental factors that may contribute to dry skin  What is the treatment for dry skin? The mainstay of treatment of dry skin and ichthyosis is moisturisers/emollients  They should be applied liberally and often enough to:   Reduce itch    Improve the barrier function    Prevent entry of irritants, bacteria    Reduce transepidermal water loss  When considering which emollient is most suitable, consider:   Severity of the dryness    Tolerance    Personal preference    Cost and availability  Emollients generally work best if applied to damp skin, if pH is below 7 (acidic), and if containing humectants such as urea or propylene glycol  Additional treatments include:   Topical steroid if itchy or there is dermatitis -- choose an emollient base    Topical calcineurin inhibitors if topical steroids are unsuitable  How can dry skin be prevented? Eliminate aggravating factors:   Reduce the frequency of bathing   A humidifier in winter and air conditioner in summer    Compare having a short shower with a prolonged soak in a bath   Use lukewarm, not hot, water   Replace standard soap with a substitute such as a synthetic detergent cleanser, water-miscible emollient, bath oil, anti-pruritic tar oil, colloidal oatmeal etc     Apply an emollient liberally and often, particularly shortly after bathing, and when itchy  The drier the skin, the thicker this should be, especially on the hands      What is the outlook for dry skin? A tendency to dry skin may persist life-long, or it may improve once contributing factors are controlled      Scribe Attestation    I,:  Young Rice MA am acting as a scribe while in the presence of the attending physician :       I,:  Rohit Zurita MD personally performed the services described in this documentation    as scribed in my presence :

## 2022-03-22 NOTE — PATIENT INSTRUCTIONS
Assessment and Plan:  Based on a thorough discussion of this condition and the management approach to it (including a comprehensive discussion of the known risks, side effects and potential benefits of treatment), the patient (family) agrees to implement the following specific plan:   Continue routine skin exams yearly    When outside we recommend using a wide brim hat, sunglasses, long sleeve and pants, sunscreen with SPF 36+ with reapplication every 2 hours, or SPF specific clothing    Discussed scheduling a laser consult once Dr Julio Ballard returns if graft area continues to be bothersome  How can basal cell carcinoma be prevented? The most important way to prevent BCC is to avoid sunburn  This is especially important in childhood and early life  Fair skinned individuals and those with a personal or family history of BCC should protect their skin from sun exposure daily, year-round and lifelong   Stay indoors or under the shade in the middle of the day    Wear covering clothing    Apply high protection factor SPF50+ broad-spectrum sunscreens generously to exposed skin if outdoors    Avoid indoor tanning (sun beds, solaria)   Oral nicotinamide (vitamin B3) in a dose of 500 mg twice daily may reduce the number and severity of BCCs  What is the outlook for basal cell carcinoma? Most BCCs are cured by treatment  Cure is most likely if treatment is undertaken when the lesion is small  About 50% of people with BCC develop a second one within 3 years of the first  They are also at increased risk of other skin cancers, especially melanoma  Regular self-skin examinations and long-term annual skin checks by an experienced health professional are recommended        Assessment and Plan:  Based on a thorough discussion of this condition and the management approach to it (including a comprehensive discussion of the known risks, side effects and potential benefits of treatment), the patient (family) agrees to implement the following specific plan:   Continue routine yearly skin exam   When outside we recommend using a wide brim hat, sunglasses, long sleeve and pants, sunscreen with SPF 43+ with reapplication every 2 hours, or SPF specific clothing     How can SCC be prevented? The most important way to prevent SCC is to avoid sunburn  This is especially important in childhood and early life  Fair skinned individuals and those with a personal or family history of BCC should protect their skin from sun exposure daily, year-round and lifelong   Stay indoors or under the shade in the middle of the day    Wear covering clothing    Apply high protection factor SPF50+ broad-spectrum sunscreens generously to exposed skin if outdoors    Avoid indoor tanning (sun beds, solaria)      What is the outlook for SCC? Most SCC are cured by treatment  Cure is most likely if treatment is undertaken when the lesion is small  A small percent of SCC's can spread to lymph  nodes and long term monitoring is indicated  They are also at increased risk of other skin cancers, especially melanoma  Regular self-skin examinations and long-term annual skin checks by an experienced health professional are recommended  Assessment and Plan:  Based on a thorough discussion of this condition and the management approach to it (including a comprehensive discussion of the known risks, side effects and potential benefits of treatment), the patient (family) agrees to implement the following specific plan:   Reassured benign     Seborrheic Keratosis  A seborrheic keratosis is a harmless warty spot that appears during adult life as a common sign of skin aging  Seborrheic keratoses can arise on any area of skin, covered or uncovered, with the usual exception of the palms and soles  They do not arise from mucous membranes  Seborrheic keratoses can have highly variable appearance  Seborrheic keratoses are extremely common   It has been estimated that over 90% of adults over the age of 61 years have one or more of them  They occur in males and females of all races, typically beginning to erupt in the 35s or 45s  They are uncommon under the age of 21 years  The precise cause of seborrhoeic keratoses is not known  Seborrhoeic keratoses are considered degenerative in nature  As time goes by, seborrheic keratoses tend to become more numerous  Some people inherit a tendency to develop a very large number of them; some people may have hundreds of them  The name "seborrheic keratosis" is misleading, because these lesions are not limited to a seborrhoeic distribution (scalp, mid-face, chest, upper back), nor are they formed from sebaceous glands, nor are they associated with sebum -- which is greasy  Seborrheic keratosis may also be called "SK," "Seb K," "basal cell papilloma," "senile wart," or "barnacle "      Researchers have noted:   Eruptive seborrhoeic keratoses can follow sunburn or dermatitis   Skin friction may be the reason they appear in body folds   Viral cause (e g , human papillomavirus) seems unlikely   Stable and clonal mutations or activation of FRFR3, PIK3CA, VANCE, AKT1 and EGFR genes are found in seborrhoeic keratoses   Seborrhoeic keratosis can arise from solar lentigo   FRFR3 mutations also arise in solar lentigines  These mutations are associated with increased age and location on the head and neck, suggesting a role of ultraviolet radiation in these lesions   Seborrheic keratoses do not harbour tumour suppressor gene mutations   Epidermal growth factor receptor inhibitors, which are used to treat some cancers, often result in an increase in verrucal (warty) keratoses  There is no easy way to remove multiple lesions on a single occasion  Unless a specific lesion is "inflamed" and is causing pain or stinging/burning or is bleeding, most insurance companies do not authorize treatment      Assessment and Plan:  Based on a thorough discussion of this condition and the management approach to it (including a comprehensive discussion of the known risks, side effects and potential benefits of treatment), the patient (family) agrees to implement the following specific plan:   Monitor for changes   When outside we recommend using a wide brim hat, sunglasses, long sleeve and pants, sunscreen with SPF 06+ with reapplication every 2 hours, or SPF specific clothing    Continue routine yearly skin exam      Melanocytic Nevi  Melanocytic nevi ("moles") are tan or brown, raised or flat areas of the skin which have an increased number of melanocytes  Melanocytes are the cells in our body which make pigment and account for skin color  Some moles are present at birth (I e , "congenital nevi"), while others come up later in life (i e , "acquired nevi")  The sun can stimulate the body to make more moles  Sunburns are not the only thing that triggers more moles  Chronic sun exposure can do it too  Clinically distinguishing a healthy mole from melanoma may be difficult, even for experienced dermatologists  The "ABCDE's" of moles have been suggested as a means of helping to alert a person to a suspicious mole and the possible increased risk of melanoma  The suggestions for raising alert are as follows:    Asymmetry: Healthy moles tend to be symmetric, while melanomas are often asymmetric  Asymmetry means if you draw a line through the mole, the two halves do not match in color, size, shape, or surface texture  Asymmetry can be a result of rapid enlargement of a mole, the development of a raised area on a previously flat lesion, scaling, ulceration, bleeding or scabbing within the mole  Any mole that starts to demonstrate "asymmetry" should be examined promptly by a board certified dermatologist      Border: Healthy moles tend to have discrete, even borders    The border of a melanoma often blends into the normal skin and does not sharply delineate the mole from normal skin  Any mole that starts to demonstrate "uneven borders" should be examined promptly by a board certified dermatologist      Color: Healthy moles tend to be one color throughout  Melanomas tend to be made up of different colors ranging from dark black, blue, white, or red  Any mole that demonstrates a color change should be examined promptly by a board certified dermatologist      Diameter: Healthy moles tend to be smaller than 0 6 cm in size; an exception are "congenital nevi" that can be larger  Melanomas tend to grow and can often be greater than 0 6 cm (1/4 of an inch, or the size of a pencil eraser)  This is only a guideline, and many normal moles may be larger than 0 6 cm without being unhealthy  Any mole that starts to change in size (small to bigger or bigger to smaller) should be examined promptly by a board certified dermatologist      Evolving: Healthy moles tend to "stay the same "  Melanomas may often show signs of change or evolution such as a change in size, shape, color, or elevation  Any mole that starts to itch, bleed, crust, burn, hurt, or ulcerate or demonstrate a change or evolution should be examined promptly by a board certified dermatologist       Dysplastic Nevi  Dysplastic moles are moles that fit the ABCDE rules of melanoma but are not identified as melanomas when examined under the microscope  They may indicate an increased risk of melanoma in that person  If there is a family history of melanoma, most experts agree that the person may be at an increased risk for developing a melanoma  Experts still do not agree on what dysplastic moles mean in patients without a personal or family history of melanoma  Dysplastic moles are usually larger than common moles and have different colors within it with irregular borders  The appearance can be very similar to a melanoma  Biopsies of dysplastic moles may show abnormalities which are different from a regular mole  Melanoma  Malignant melanoma is a type of skin cancer that can be deadly if it spreads throughout the body  The incidence of melanoma in the [place] is growing faster than any other cancer  Melanoma usually grows near the surface of the skin for a period of time, and then begins to grow deeper into the skin  Once it grows deeper into the skin, the risk of spread to other organs greatly increases  Therefore, early detection and removal of a malignant melanoma may result in a better chance at a complete cure; removal after the tumor has spread may not be as effective, leading to worse clinical outcomes such as death  The true rate of nevus transformation into a melanoma is unknown  It has been estimated that the lifetime risk for any acquired melanocytic nevus on any 21year-old individual transforming into melanoma by age [de-identified] is 0 03% (1 in 3,164) for men and 0 009% (1 in 10,800) for women  The appearance of a "new mole" remains one of the most reliable methods for identifying a malignant melanoma  Occasionally, melanomas appear as rapidly growing, blue-black, dome-shaped bumps within a previous mole or previous area of normal skin  Other times, melanomas are suspected when a mole suddenly appears or changes  Itching, burning, or pain in a pigmented lesion should increase suspicion, but most patients with early melanoma have no skin discomfort whatsoever  Melanoma can occur anywhere on the skin, including areas that are difficult for self-examination  Many melanomas are first noticed by other family members  Suspicious-looking moles may be removed for microscopic examination  You may be able to prevent death from melanoma by doing two simple things:    1  Try to avoid unnecessary sun exposure and protect your skin when it is exposed to the sun    People who live near the equator, people who have intermittent exposures to large amounts of sun, and people who have had sunburns in childhood or adolescence have an increased risk for melanoma  Sun sense and vigilant sun protection may be keys to helping to prevent melanoma  We recommend wearing UPF-rated sun protective clothing and sunglasses whenever possible and applying a moisturizer-sunscreen combination product (SPF 50+) such as Neutrogena Daily Defense to sun exposed areas of skin at least three times a day  2  Have your moles regularly examined by a board certified dermatologist AND by yourself or a family member/friend at home  We recommend that you have your moles examined at least once a year by a board certified dermatologist   Use your birthday as an annual reminder to have your "Birthday Suit" (I e , your skin) examined; it is a nice birthday gift to yourself to know that your skin is healthy appearing! Additionally, at-home self examinations may be helpful for detecting a possible melanoma  Use the ABCDEs we discussed and check your moles once a month at home  Assessment and Plan:  Based on a thorough discussion of this condition and the management approach to it (including a comprehensive discussion of the known risks, side effects and potential benefits of treatment), the patient (family) agrees to implement the following specific plan:   Reassured benign     Assessment and Plan:    Cherry angioma, also known as Satish Si spots, are benign vascular skin lesions  A "cherry angioma" is a firm red, blue or purple papule, 0 1-1 cm in diameter  When thrombosed, they can appear black in colour until evaluated with a dermatoscope when the red or purple colour is more easily seen  Cherry angioma may develop on any part of the body but most often appear on the scalp, face, lips and trunk  An angioma is due to proliferating endothelial cells; these are the cells that line the inside of a blood vessel  Angiomas can arise in early life or later in life; the most common type of angioma is a cherry angioma    Cherry angiomas are very common in males and females of any age or race  They are more noticeable in white skin than in skin of colour  They markedly increase in number from about the age of 36  There may be a family history of similar lesions  Eruptive cherry angiomas have been rarely reported to be associated with internal malignancy  The cause of angiomas is unknown  Genetic analysis of cherry angiomas has shown that they frequently carry specific somatic missense mutations in the GNAQ and GNA11 (Q209H) genes, which are involved in other vascular and melanocytic proliferations  Cherry angioma is usually diagnosed clinically and no investigations are necessary for the majority of lesions  It has a characteristic red-clod or lobular pattern on dermatoscopy (called lacunar pattern using conventional pattern analysis)  When there is uncertainty about the diagnosis, a biopsy may be performed  The angioma is composed of venules in a thickened papillary dermis  Collagen bundles may be prominent between the lobules  Cherry angiomas are harmless, so they do not usually have to be treated  Occasionally, they are removed to exclude a malignant skin lesion such as a nodular melanoma or because they are irritated or bleeding (and a subsequent risk for infection)  To decrease friction over the lesions, we recommend Neutrogena Daily Defense SPF 50+ at least 3 times a day  Assessment and Plan:  Based on a thorough discussion of this condition and the management approach to it (including a comprehensive discussion of the known risks, side effects and potential benefits of treatment), the patient (family) agrees to implement the following specific plan: Advise gentle skin care as follows: Shower with lukewarm water less than 10 minutes   Use Dove unscented soap to groin and armpits and neck        Pat dry after shower  Do not harshly rub     Immediately moisturize with heavy emollient   BEST - OINTMENTS, such as Vaseline, Aquaphor, Cerave healing ointment     BETTER - CREAMS, such as Cerave, Cetaphil, VaniCREAM, Aveeno, Eucerin  AVOID LOTIONS, too thin, most things in pump         Moisturize twice a day  Dry skin refers to skin that feels dry to touch  Dry skin has a dull surface with a rough, scaly quality  The skin is less pliable and cracked  When dryness is severe, the skin may become inflamed and fissured  Although any body site can be dry, dry skin tends to affect the shins more than any other site  Dry skin is lacking moisture in the outer horny cell layer (stratum corneum) and this results in cracks in the skin surface  Dry skin is also called xerosis, xeroderma or asteatosis (lack of fat)  It can affect males and females of all ages  There is some racial variability in water and lipid content of the skin   Dry skin that starts in early childhood may be one of about 20 types of ichthyosis (fish-scale skin)  There is often a family history of dry skin   Dry skin is commonly seen in people with atopic dermatitis   Nearly everyone > 60 years has dry skin  Dry skin that begins later may be seen in people with certain diseases and conditions   Postmenopausal women   Hypothyroidism   Chronic renal disease    Malnutrition and weight loss    Subclinical dermatitis    Treatment with certain drugs such as oral retinoids, diuretics and epidermal growth factor receptor inhibitors    People exposed to a dry environment may experience dry skin   Low humidity: in desert climates or cool, windy conditions    Excessive air conditioning    Direct heat from a fire or fan heater    Excessive bathing    Contact with soap, detergents and solvents    Inappropriate topical agents such as alcohol    Frictional irritation from rough clothing or abrasives    Dry skin is due to abnormalities in the integrity of the barrier function of the stratum corneum, which is made up of corneocytes     There is an overall reduction in the lipids in the stratum corneum   Ratio of ceramides, cholesterol and free fatty acids may be normal or altered   There may be a reduction in the proliferation of keratinocytes   Keratinocyte subtypes change in dry skin with a decrease in keratins K1, K10 and increase in K5, K14     Involucrin (a protein) may be expressed early, increasing cell stiffness   The result is retention of corneocytes and reduced water-holding capacity  The inherited forms of ichthyosis are due to loss of function mutations in various genes (listed in parentheses below)  The clinical features of ichthyosis depend on the specific type of ichthyosis:   Ichthyosis vulgaris (FLG)   Recessive X-linked ichthyosis (STS)    Autosomal recessive congenital ichthyosis (ABCA12, TGM1, ALOXE3)    Keratinopathic ichthyoses (KRT1, KRT10, KRT2)    Acquired ichthyosis may be due to:   Metabolic factors: thyroid deficiency    Illness: lymphoma, internal malignancy, sarcoidosis, HIV infection    Drugs: nicotinic acid, kava, protein kinase inhibitors (eg EGFR inhibitors), hydroxyurea  Complications of dry skin:  Dry areas of skin may become itchy, indicating a form of eczema/dermatitis has developed   Atopic eczema -- especially in people with ichthyosis vulgaris    Eczema craquelé -- especially in elderly people  Also called asteatotic eczema    A dry form of nummular dermatitis/discoid eczema -- especially in people that wash their skin excessively  When the dry skin of an elderly person is itchy without a visible rash, it is sometimes called winter itch, 7th age itch, senile pruritus or chronic pruritus of the elderly      Other complications of dry skin may include:   Skin infection when bacteria or viruses penetrate a break in the skin surface    Overheating, especially in some forms of ichthyosis    Food allergy, eg, to peanuts, has been associated with filaggrin mutations    Contact allergy, eg, to nickel, has also been correlated with barrier function defects  How is the type of dry skin diagnosed? The type of dry skin is diagnosed by careful history and examination  In children:   Family history    Age of onset    Appearance at birth, if known    Distribution of dry skin    Other features, eg eczema, abnormal nails, hair, dentition, sight, hearing  In adults:   Medical history    Medications and topical preparations    Bathing frequency and use of soap    Evaluation of environmental factors that may contribute to dry skin  What is the treatment for dry skin? The mainstay of treatment of dry skin and ichthyosis is moisturisers/emollients  They should be applied liberally and often enough to:   Reduce itch    Improve the barrier function    Prevent entry of irritants, bacteria    Reduce transepidermal water loss  When considering which emollient is most suitable, consider:   Severity of the dryness    Tolerance    Personal preference    Cost and availability  Emollients generally work best if applied to damp skin, if pH is below 7 (acidic), and if containing humectants such as urea or propylene glycol  Additional treatments include:   Topical steroid if itchy or there is dermatitis -- choose an emollient base    Topical calcineurin inhibitors if topical steroids are unsuitable  How can dry skin be prevented? Eliminate aggravating factors:   Reduce the frequency of bathing   A humidifier in winter and air conditioner in summer    Compare having a short shower with a prolonged soak in a bath   Use lukewarm, not hot, water   Replace standard soap with a substitute such as a synthetic detergent cleanser, water-miscible emollient, bath oil, anti-pruritic tar oil, colloidal oatmeal etc     Apply an emollient liberally and often, particularly shortly after bathing, and when itchy  The drier the skin, the thicker this should be, especially on the hands      What is the outlook for dry skin? A tendency to dry skin may persist life-long, or it may improve once contributing factors are controlled  Assessment and Plan:  Based on a thorough discussion of this condition and the management approach to it (including a comprehensive discussion of the known risks, side effects and potential benefits of treatment), the patient (family) agrees to implement the following specific plan:     Cryotherapy preformed today normal to have redness, irritation, blister may develop  Do not pop  Apply Vaseline to help with healing  Actinic keratoses are very common on sites repeatedly exposed to the sun, especially the backs of the hands and the face, most often affecting the ears, nose, cheeks, upper lip, vermilion of the lower lip, temples, forehead and balding scalp  In severely chronically sun-damaged individuals, they may also be found on the upper trunk, upper and lower limbs, and dorsum of feet  We discussed the theoretical premalignant (pre-cancerous) nature and etiology of these growths  We discussed the prevailing notion that actinic keratoses are a reflection of abnormal skin cell development due to DNA damage by short wavelength UVB  They are more likely to appear if the immune function is poor, due to aging, recent sun exposure, predisposing disease or certain drugs  We discussed that the main concern is that actinic keratoses may predispose to squamous cell carcinoma  It is rare for a solitary actinic keratosis to evolve to squamous cell carcinoma (SCC), but the risk of SCC occurring at some stage in a patient with more than 10 actinic keratoses is thought to be about 10 to 15%  A tender, thickened, ulcerated or enlarging actinic keratosis is suspicious of SCC  Actinic keratoses may be prevented by strict sun protection   If already present, keratoses may improve with a very high sun protection factor (50+) broad-spectrum sunscreen applied at least daily to affected areas, year-round  We recommend that UPF-rated clothing and hats and sunglasses be worn whenever possible and that a sunscreen-moisturizer combination product such as Neutrogena Daily Defense be applied at least three times a day  We performed a thorough discussion of treatment options and specific risk/benefits/alternatives including but not limited to medical field treatment with medications such as the following:     Topical field area medications such as 5-fluorouracil or Aldara (specifically, the trouble with long-term compliance, blistering and local skin reaction versus the convenience of at-home therapy and that field therapy gets what is not yet seen)   Cryotherapy (specifically, local pain, scarring, dyspigmentation, blistering, possible superinfection, and treats only what we see versus directed treatment today)   Photodynamic therapy (specifically, local pain, scarring, dyspigmentation, blistering, possible superinfection, need to schedule for a later date, and time spent in the office versus field therapy that gets what is not yet seen)

## 2022-03-23 ENCOUNTER — HOSPITAL ENCOUNTER (OUTPATIENT)
Dept: SLEEP CENTER | Facility: CLINIC | Age: 72
Discharge: HOME | End: 2022-03-23
Payer: MEDICARE

## 2022-03-23 DIAGNOSIS — N18.30 CHRONIC KIDNEY DISEASE, STAGE III (MODERATE) (HCC): ICD-10-CM

## 2022-03-23 DIAGNOSIS — I12.9 HYPERTENSIVE CHRONIC KIDNEY DISEASE WITH STAGE 1 THROUGH STAGE 4 CHRONIC KIDNEY DISEASE, OR UNSPECIFIED CHRONIC KIDNEY DISEASE: ICD-10-CM

## 2022-03-23 DIAGNOSIS — G47.33 OSA (OBSTRUCTIVE SLEEP APNEA): ICD-10-CM

## 2022-03-23 DIAGNOSIS — I10 ESSENTIAL HYPERTENSION: ICD-10-CM

## 2022-03-23 PROCEDURE — 95810 POLYSOM 6/> YRS 4/> PARAM: CPT | Performed by: INTERNAL MEDICINE

## 2022-03-23 PROCEDURE — 95976 ALYS SMPL CN NPGT PRGRMG: CPT

## 2022-03-23 PROCEDURE — 95810 POLYSOM 6/> YRS 4/> PARAM: CPT

## 2022-03-23 RX ORDER — AMLODIPINE BESYLATE 5 MG/1
5 TABLET ORAL DAILY
Qty: 90 TABLET | Refills: 0 | Status: SHIPPED | OUTPATIENT
Start: 2022-03-23 | End: 2022-06-05

## 2022-03-24 NOTE — PROGRESS NOTES
Sleep Study Documentation    Pre-Sleep Study       Sleep testing procedure explained to patient:YES    Patient napped prior to study:YES- more than 30 minutes  Napped after 2PM: yes    Caffeine:Dayshift worker after 12PM   Caffeine use:YES- soda  12 ounces    Alcohol:Dayshift workers after 5PM: Alcohol use:NO    Typical day for patient:YES       Study Documentation    Sleep Study Indications: The patient is here for fine tuning of the [facility] Concert Window device  Sleep Study: Treatment   Optimal voltage: 2 5v  Snore:Eliminated  REM Obtained:no  Supplemental O2: no    Minimum SaO2 88%  Baseline SaO2 91%  Voltage at which snoring was eliminated 2 3v  Minimum SaO2 at final voltage 88%    EKG abnormalities: yes:  Comments: Possible a-fib     EEG abnormalities: no    Sleep Study Recorded < 2 hours: N/A    Sleep Study Recorded > 2 hours but incomplete study: N/A    Sleep Study Recorded 6 hours but no sleep obtained: NO    Patient classification: retired       Post-Sleep Study    Medication used at bedtime or during sleep study:YES other prescription medications    Patient reports time it took to fall asleep:less than 20 minutes    Patient reports waking up during study:3 or more times  Patient reports returning to sleep without difficulty  Patient reports sleeping 6 to 8 hours without dreaming  Patient reports sleep during study:typical    Patient rated sleepiness: Not sleepy or tired    PAP treatment:no

## 2022-03-28 ENCOUNTER — TELEPHONE (OUTPATIENT)
Dept: SLEEP CENTER | Facility: CLINIC | Age: 72
End: 2022-03-28

## 2022-03-28 NOTE — TELEPHONE ENCOUNTER
Called patient and advised sleep study resulted  Has follow up with Dr Ramya Ramirez on 4/13/22 to review results  Provided patient with date, time and location of appointment

## 2022-03-30 ENCOUNTER — APPOINTMENT (EMERGENCY)
Dept: CT IMAGING | Facility: HOSPITAL | Age: 72
DRG: 683 | End: 2022-03-30
Payer: MEDICARE

## 2022-03-30 ENCOUNTER — HOSPITAL ENCOUNTER (INPATIENT)
Facility: HOSPITAL | Age: 72
LOS: 2 days | Discharge: HOME | DRG: 683 | End: 2022-04-01
Attending: EMERGENCY MEDICINE | Admitting: INTERNAL MEDICINE
Payer: MEDICARE

## 2022-03-30 DIAGNOSIS — R19.7 DIARRHEA: ICD-10-CM

## 2022-03-30 DIAGNOSIS — E78.5 DYSLIPIDEMIA: ICD-10-CM

## 2022-03-30 DIAGNOSIS — R79.89 ABNORMAL THYROID BLOOD TEST: ICD-10-CM

## 2022-03-30 DIAGNOSIS — K31.84 GASTROPARESIS DIABETICORUM (HCC): ICD-10-CM

## 2022-03-30 DIAGNOSIS — N39.0 UTI (URINARY TRACT INFECTION): ICD-10-CM

## 2022-03-30 DIAGNOSIS — K21.9 GASTROESOPHAGEAL REFLUX DISEASE WITHOUT ESOPHAGITIS: ICD-10-CM

## 2022-03-30 DIAGNOSIS — E11.43 GASTROPARESIS DIABETICORUM (HCC): ICD-10-CM

## 2022-03-30 DIAGNOSIS — R10.9 ABDOMINAL PAIN: ICD-10-CM

## 2022-03-30 DIAGNOSIS — N17.9 AKI (ACUTE KIDNEY INJURY) (HCC): Primary | ICD-10-CM

## 2022-03-30 LAB
ALBUMIN SERPL BCP-MCNC: 3.7 G/DL (ref 3.5–5)
ALP SERPL-CCNC: 93 U/L (ref 46–116)
ALT SERPL W P-5'-P-CCNC: 72 U/L (ref 12–78)
ANION GAP SERPL CALCULATED.3IONS-SCNC: 10 MMOL/L (ref 4–13)
AST SERPL W P-5'-P-CCNC: 33 U/L (ref 5–45)
BACTERIA UR QL AUTO: ABNORMAL /HPF
BASOPHILS # BLD AUTO: 0.03 THOUSANDS/ΜL (ref 0–0.1)
BASOPHILS NFR BLD AUTO: 0 % (ref 0–1)
BILIRUB SERPL-MCNC: 0.37 MG/DL (ref 0.2–1)
BILIRUB UR QL STRIP: NEGATIVE
BUN SERPL-MCNC: 76 MG/DL (ref 5–25)
CALCIUM SERPL-MCNC: 9 MG/DL (ref 8.3–10.1)
CHLORIDE SERPL-SCNC: 99 MMOL/L (ref 100–108)
CLARITY UR: CLEAR
CO2 SERPL-SCNC: 25 MMOL/L (ref 21–32)
COLOR UR: YELLOW
CREAT SERPL-MCNC: 2.8 MG/DL (ref 0.6–1.3)
EOSINOPHIL # BLD AUTO: 0.31 THOUSAND/ΜL (ref 0–0.61)
EOSINOPHIL NFR BLD AUTO: 3 % (ref 0–6)
ERYTHROCYTE [DISTWIDTH] IN BLOOD BY AUTOMATED COUNT: 14 % (ref 11.6–15.1)
GFR SERPL CREATININE-BSD FRML MDRD: 16 ML/MIN/1.73SQ M
GLUCOSE SERPL-MCNC: 134 MG/DL (ref 65–140)
GLUCOSE SERPL-MCNC: 171 MG/DL (ref 65–140)
GLUCOSE SERPL-MCNC: 181 MG/DL (ref 65–140)
GLUCOSE UR STRIP-MCNC: NEGATIVE MG/DL
HCT VFR BLD AUTO: 41.2 % (ref 34.8–46.1)
HGB BLD-MCNC: 13.5 G/DL (ref 11.5–15.4)
HGB UR QL STRIP.AUTO: NEGATIVE
IMM GRANULOCYTES # BLD AUTO: 0.05 THOUSAND/UL (ref 0–0.2)
IMM GRANULOCYTES NFR BLD AUTO: 1 % (ref 0–2)
KETONES UR STRIP-MCNC: NEGATIVE MG/DL
LEUKOCYTE ESTERASE UR QL STRIP: ABNORMAL
LIPASE SERPL-CCNC: 61 U/L (ref 73–393)
LYMPHOCYTES # BLD AUTO: 2.37 THOUSANDS/ΜL (ref 0.6–4.47)
LYMPHOCYTES NFR BLD AUTO: 23 % (ref 14–44)
MCH RBC QN AUTO: 29.9 PG (ref 26.8–34.3)
MCHC RBC AUTO-ENTMCNC: 32.8 G/DL (ref 31.4–37.4)
MCV RBC AUTO: 91 FL (ref 82–98)
MONOCYTES # BLD AUTO: 0.67 THOUSAND/ΜL (ref 0.17–1.22)
MONOCYTES NFR BLD AUTO: 6 % (ref 4–12)
NEUTROPHILS # BLD AUTO: 7.06 THOUSANDS/ΜL (ref 1.85–7.62)
NEUTS SEG NFR BLD AUTO: 67 % (ref 43–75)
NITRITE UR QL STRIP: NEGATIVE
NON-SQ EPI CELLS URNS QL MICRO: ABNORMAL /HPF
NRBC BLD AUTO-RTO: 0 /100 WBCS
PH UR STRIP.AUTO: 5 [PH] (ref 4.5–8)
PLATELET # BLD AUTO: 247 THOUSANDS/UL (ref 149–390)
PMV BLD AUTO: 11.7 FL (ref 8.9–12.7)
POTASSIUM SERPL-SCNC: 4.3 MMOL/L (ref 3.5–5.3)
PROCALCITONIN SERPL-MCNC: 0.09 NG/ML
PROT SERPL-MCNC: 7.1 G/DL (ref 6.4–8.2)
PROT UR STRIP-MCNC: ABNORMAL MG/DL
RBC # BLD AUTO: 4.52 MILLION/UL (ref 3.81–5.12)
RBC #/AREA URNS AUTO: ABNORMAL /HPF
SODIUM SERPL-SCNC: 134 MMOL/L (ref 136–145)
SP GR UR STRIP.AUTO: 1.01 (ref 1–1.03)
TSH SERPL DL<=0.05 MIU/L-ACNC: 0.04 UIU/ML (ref 0.36–3.74)
UROBILINOGEN UR QL STRIP.AUTO: 0.2 E.U./DL
WBC # BLD AUTO: 10.49 THOUSAND/UL (ref 4.31–10.16)
WBC #/AREA URNS AUTO: ABNORMAL /HPF

## 2022-03-30 PROCEDURE — 99223 1ST HOSP IP/OBS HIGH 75: CPT | Performed by: INTERNAL MEDICINE

## 2022-03-30 PROCEDURE — 84443 ASSAY THYROID STIM HORMONE: CPT | Performed by: PHYSICIAN ASSISTANT

## 2022-03-30 PROCEDURE — 84145 PROCALCITONIN (PCT): CPT | Performed by: PHYSICIAN ASSISTANT

## 2022-03-30 PROCEDURE — 80053 COMPREHEN METABOLIC PANEL: CPT | Performed by: PHYSICIAN ASSISTANT

## 2022-03-30 PROCEDURE — 96361 HYDRATE IV INFUSION ADD-ON: CPT

## 2022-03-30 PROCEDURE — 81001 URINALYSIS AUTO W/SCOPE: CPT

## 2022-03-30 PROCEDURE — 82948 REAGENT STRIP/BLOOD GLUCOSE: CPT

## 2022-03-30 PROCEDURE — 36415 COLL VENOUS BLD VENIPUNCTURE: CPT | Performed by: PHYSICIAN ASSISTANT

## 2022-03-30 PROCEDURE — 99285 EMERGENCY DEPT VISIT HI MDM: CPT

## 2022-03-30 PROCEDURE — 74176 CT ABD & PELVIS W/O CONTRAST: CPT

## 2022-03-30 PROCEDURE — 87186 SC STD MICRODIL/AGAR DIL: CPT

## 2022-03-30 PROCEDURE — 99285 EMERGENCY DEPT VISIT HI MDM: CPT | Performed by: PHYSICIAN ASSISTANT

## 2022-03-30 PROCEDURE — 87086 URINE CULTURE/COLONY COUNT: CPT

## 2022-03-30 PROCEDURE — 85025 COMPLETE CBC W/AUTO DIFF WBC: CPT | Performed by: PHYSICIAN ASSISTANT

## 2022-03-30 PROCEDURE — 96360 HYDRATION IV INFUSION INIT: CPT

## 2022-03-30 PROCEDURE — 87077 CULTURE AEROBIC IDENTIFY: CPT

## 2022-03-30 PROCEDURE — 83690 ASSAY OF LIPASE: CPT | Performed by: PHYSICIAN ASSISTANT

## 2022-03-30 PROCEDURE — G1004 CDSM NDSC: HCPCS

## 2022-03-30 RX ORDER — ATORVASTATIN CALCIUM 40 MG/1
40 TABLET, FILM COATED ORAL
Status: DISCONTINUED | OUTPATIENT
Start: 2022-03-30 | End: 2022-04-01 | Stop reason: HOSPADM

## 2022-03-30 RX ORDER — MAGNESIUM HYDROXIDE/ALUMINUM HYDROXICE/SIMETHICONE 120; 1200; 1200 MG/30ML; MG/30ML; MG/30ML
30 SUSPENSION ORAL EVERY 6 HOURS PRN
Status: DISCONTINUED | OUTPATIENT
Start: 2022-03-30 | End: 2022-04-01 | Stop reason: HOSPADM

## 2022-03-30 RX ORDER — FLUTICASONE PROPIONATE 50 MCG
1 SPRAY, SUSPENSION (ML) NASAL DAILY
Status: DISCONTINUED | OUTPATIENT
Start: 2022-03-30 | End: 2022-04-01 | Stop reason: HOSPADM

## 2022-03-30 RX ORDER — ACETAMINOPHEN 325 MG/1
650 TABLET ORAL ONCE
Status: COMPLETED | OUTPATIENT
Start: 2022-03-30 | End: 2022-03-30

## 2022-03-30 RX ORDER — HYDRALAZINE HYDROCHLORIDE 25 MG/1
25 TABLET, FILM COATED ORAL EVERY 12 HOURS
Status: DISCONTINUED | OUTPATIENT
Start: 2022-03-30 | End: 2022-04-01 | Stop reason: HOSPADM

## 2022-03-30 RX ORDER — ALBUTEROL SULFATE 90 UG/1
2 AEROSOL, METERED RESPIRATORY (INHALATION) EVERY 6 HOURS PRN
Status: DISCONTINUED | OUTPATIENT
Start: 2022-03-30 | End: 2022-04-01 | Stop reason: HOSPADM

## 2022-03-30 RX ORDER — ONDANSETRON 2 MG/ML
4 INJECTION INTRAMUSCULAR; INTRAVENOUS EVERY 6 HOURS PRN
Status: DISCONTINUED | OUTPATIENT
Start: 2022-03-30 | End: 2022-04-01 | Stop reason: HOSPADM

## 2022-03-30 RX ORDER — FAMOTIDINE 20 MG/1
10 TABLET, FILM COATED ORAL DAILY
Status: DISCONTINUED | OUTPATIENT
Start: 2022-03-31 | End: 2022-03-31

## 2022-03-30 RX ORDER — SACCHAROMYCES BOULARDII 250 MG
250 CAPSULE ORAL 2 TIMES DAILY
Status: DISCONTINUED | OUTPATIENT
Start: 2022-03-30 | End: 2022-04-01 | Stop reason: HOSPADM

## 2022-03-30 RX ORDER — AMLODIPINE BESYLATE 5 MG/1
5 TABLET ORAL DAILY
Status: DISCONTINUED | OUTPATIENT
Start: 2022-03-30 | End: 2022-03-30

## 2022-03-30 RX ORDER — METOPROLOL TARTRATE 50 MG/1
50 TABLET, FILM COATED ORAL EVERY 12 HOURS SCHEDULED
Status: DISCONTINUED | OUTPATIENT
Start: 2022-03-30 | End: 2022-04-01 | Stop reason: HOSPADM

## 2022-03-30 RX ORDER — LEVOTHYROXINE SODIUM 0.12 MG/1
125 TABLET ORAL
Status: DISCONTINUED | OUTPATIENT
Start: 2022-03-31 | End: 2022-04-01 | Stop reason: HOSPADM

## 2022-03-30 RX ORDER — ICOSAPENT ETHYL 1000 MG/1
1 CAPSULE ORAL 2 TIMES DAILY
Status: DISCONTINUED | OUTPATIENT
Start: 2022-03-30 | End: 2022-03-30 | Stop reason: RX

## 2022-03-30 RX ORDER — AMLODIPINE BESYLATE 5 MG/1
5 TABLET ORAL DAILY
Status: DISCONTINUED | OUTPATIENT
Start: 2022-03-31 | End: 2022-04-01 | Stop reason: HOSPADM

## 2022-03-30 RX ORDER — SODIUM CHLORIDE, SODIUM GLUCONATE, SODIUM ACETATE, POTASSIUM CHLORIDE, MAGNESIUM CHLORIDE, SODIUM PHOSPHATE, DIBASIC, AND POTASSIUM PHOSPHATE .53; .5; .37; .037; .03; .012; .00082 G/100ML; G/100ML; G/100ML; G/100ML; G/100ML; G/100ML; G/100ML
100 INJECTION, SOLUTION INTRAVENOUS CONTINUOUS
Status: DISPENSED | OUTPATIENT
Start: 2022-03-30 | End: 2022-03-31

## 2022-03-30 RX ORDER — HEPARIN SODIUM 5000 [USP'U]/ML
5000 INJECTION, SOLUTION INTRAVENOUS; SUBCUTANEOUS EVERY 8 HOURS SCHEDULED
Status: DISCONTINUED | OUTPATIENT
Start: 2022-03-30 | End: 2022-04-01 | Stop reason: HOSPADM

## 2022-03-30 RX ORDER — ACETAMINOPHEN 325 MG/1
650 TABLET ORAL EVERY 6 HOURS PRN
Status: DISCONTINUED | OUTPATIENT
Start: 2022-03-30 | End: 2022-04-01 | Stop reason: HOSPADM

## 2022-03-30 RX ORDER — CHLORAL HYDRATE 500 MG
1000 CAPSULE ORAL 2 TIMES DAILY
Status: DISCONTINUED | OUTPATIENT
Start: 2022-03-30 | End: 2022-04-01 | Stop reason: HOSPADM

## 2022-03-30 RX ORDER — METRONIDAZOLE 500 MG/1
500 TABLET ORAL EVERY 8 HOURS SCHEDULED
Status: DISCONTINUED | OUTPATIENT
Start: 2022-03-30 | End: 2022-03-31

## 2022-03-30 RX ADMIN — SODIUM CHLORIDE 1000 ML: 0.9 INJECTION, SOLUTION INTRAVENOUS at 12:55

## 2022-03-30 RX ADMIN — HEPARIN SODIUM 5000 UNITS: 5000 INJECTION INTRAVENOUS; SUBCUTANEOUS at 16:07

## 2022-03-30 RX ADMIN — OMEGA-3 FATTY ACIDS CAP 1000 MG 1000 MG: 1000 CAP at 18:27

## 2022-03-30 RX ADMIN — ALUMINA, MAGNESIA, AND SIMETHICONE ORAL SUSPENSION REGULAR STRENGTH 30 ML: 1200; 1200; 120 SUSPENSION ORAL at 16:07

## 2022-03-30 RX ADMIN — METOPROLOL TARTRATE 50 MG: 50 TABLET, FILM COATED ORAL at 21:49

## 2022-03-30 RX ADMIN — FLUTICASONE PROPIONATE 1 SPRAY: 50 SPRAY, METERED NASAL at 16:07

## 2022-03-30 RX ADMIN — ALUMINA, MAGNESIA, AND SIMETHICONE ORAL SUSPENSION REGULAR STRENGTH 30 ML: 1200; 1200; 120 SUSPENSION ORAL at 22:57

## 2022-03-30 RX ADMIN — Medication 250 MG: at 18:31

## 2022-03-30 RX ADMIN — INSULIN DETEMIR 30 UNITS: 100 INJECTION, SOLUTION SUBCUTANEOUS at 21:50

## 2022-03-30 RX ADMIN — SODIUM CHLORIDE, SODIUM GLUCONATE, SODIUM ACETATE, POTASSIUM CHLORIDE, MAGNESIUM CHLORIDE, SODIUM PHOSPHATE, DIBASIC, AND POTASSIUM PHOSPHATE 100 ML/HR: .53; .5; .37; .037; .03; .012; .00082 INJECTION, SOLUTION INTRAVENOUS at 16:58

## 2022-03-30 RX ADMIN — HYDRALAZINE HYDROCHLORIDE 25 MG: 25 TABLET ORAL at 16:15

## 2022-03-30 RX ADMIN — METRONIDAZOLE 500 MG: 500 TABLET ORAL at 18:27

## 2022-03-30 RX ADMIN — ACETAMINOPHEN 325MG 650 MG: 325 TABLET ORAL at 13:09

## 2022-03-30 RX ADMIN — INSULIN LISPRO 1 UNITS: 100 INJECTION, SOLUTION INTRAVENOUS; SUBCUTANEOUS at 21:49

## 2022-03-30 RX ADMIN — CEFTRIAXONE SODIUM 1000 MG: 10 INJECTION, POWDER, FOR SOLUTION INTRAVENOUS at 16:07

## 2022-03-30 RX ADMIN — HEPARIN SODIUM 5000 UNITS: 5000 INJECTION INTRAVENOUS; SUBCUTANEOUS at 21:50

## 2022-03-30 NOTE — ASSESSMENT & PLAN NOTE
Lab Results   Component Value Date    HGBA1C 6 8 (H) 01/06/2022       No results for input(s): POCGLU in the last 72 hours  Blood Sugar Average: Last 72 hrs:  · Home regimen: Levemir 50 units qHS, Novolog 14 units with breakfast/lunch, 18 units with dinner  · Patient has been using 12 units Novolog with meals recently due to decreased appetite    · Continue home Levemir  · SSI q6hrs while NPO  · Hypoglycemia protocol

## 2022-03-30 NOTE — ASSESSMENT & PLAN NOTE
Lab Results   Component Value Date    EGFR 16 03/30/2022    EGFR 28 11/17/2021    EGFR 26 11/11/2021    CREATININE 2 80 (H) 03/30/2022    CREATININE 1 76 (H) 11/17/2021    CREATININE 1 90 (H) 11/11/2021   · Creatinine elevated 2 8 on admission    Also with mild hyponatremia  · Baseline creatinine 1 7-1 9  · Likely in setting of dehydration/diarrhea and poor oral intake  · Status post 1 L fluid bolus in ED  · Hold home torsemide, Aldactone  · Continue with [email or web address] overnight  · Consider nephrology consult if renal function does not improve  · Monitor BMP daily  · Avoid nephrotoxins/hypotension

## 2022-03-30 NOTE — H&P
[facility]  H&P- Ayo Alvarez 1950, 66 y o  female MRN: [id number]  Unit/Bed#: [facility] Encounter: [id number]  Primary Care Provider: Samantha Rodgers MD   Date and time admitted to hospital: 3/30/2022 11:29 AM    * Abdominal pain  Assessment & Plan  · Patient reports abdominal pain, diarrhea and nausea X 4 days  Reports yellowy, loose/watery diarrhea more than 20 times per day, poor oral intake, states she has a bowel movement almost immediately after trying to eat/drink  · History gastroparesis, follows GI outpatient and has had Botox injections in the past  · Denies recent antibiotic use, infections, illness  · CT a/p without acute findings  · UA with moderate leukocytes, follow urine culture results  · Mild leukocytosis, does not meet SIRS/sepsis criteria  · Check C  Diff, stool cultures  · Check procalcitonin  · IV ceftriaxone empirically for now  · Continue IV fluids with Plasma-Lyte given ANDRA  · NPO sips with meds for now, advance as tolerated  · GI consult given history gastroparesis    Acute kidney injury superimposed on chronic kidney disease [facility])  Assessment & Plan  Lab Results   Component Value Date    EGFR 16 03/30/2022    EGFR 28 11/17/2021    EGFR 26 11/11/2021    CREATININE 2 80 (H) 03/30/2022    CREATININE 1 76 (H) 11/17/2021    CREATININE 1 90 (H) 11/11/2021   · Creatinine elevated 2 8 on admission    Also with mild hyponatremia  · Baseline creatinine 1 7-1 9  · Likely in setting of dehydration/diarrhea and poor oral intake  · Status post 1 L fluid bolus in ED  · Hold home torsemide, Aldactone  · Continue with [email or web address] overnight  · Consider nephrology consult if renal function does not improve  · Monitor BMP daily  · Avoid nephrotoxins/hypotension    Depression, recurrent (HCC)  Assessment & Plan  · Continue Cymbalta    Gastroparesis diabeticorum [facility])  Assessment & Plan  Lab Results   Component Value Date    HGBA1C 6 8 (H) 01/06/2022       No results for input(s): POCGLU in the last 72 hours  Blood Sugar Average: Last 72 hrs:  · History gastroparesis, difficulty presents as constipation  · GI consult    Type 2 diabetes mellitus with diabetic nephropathy, with long-term current use of insulin ([facility])  Assessment & Plan  Lab Results   Component Value Date    HGBA1C 6 8 (H) 01/06/2022       No results for input(s): POCGLU in the last 72 hours  Blood Sugar Average: Last 72 hrs:  · Home regimen: Levemir 50 units qHS, Novolog 14 units with breakfast/lunch, 18 units with dinner  · Patient has been using 12 units Novolog with meals recently due to decreased appetite  · Continue home Levemir  · SSI q6hrs while NPO  · Hypoglycemia protocol    Hypertensive chronic kidney disease with stage 1 through stage 4 chronic kidney disease, or unspecified chronic kidney disease  Assessment & Plan  Lab Results   Component Value Date    EGFR 16 03/30/2022    EGFR 28 11/17/2021    EGFR 26 11/11/2021    CREATININE 2 80 (H) 03/30/2022    CREATININE 1 76 (H) 11/17/2021    CREATININE 1 90 (H) 11/11/2021   · BP stable on admission  · Hold home Aldactone, Mirapex, torsemide in setting of ANDRA  · Continue hydralazine, Norvasc    VTE Pharmacologic Prophylaxis: VTE Score: 3 Moderate Risk (Score 3-4) - Pharmacological DVT Prophylaxis Ordered: heparin  Code Status: Level 3 - DNAR and DNI   Discussion with family: Updated  () at bedside  Anticipated Length of Stay: Patient will be admitted on an inpatient basis with an anticipated length of stay of greater than 2 midnights secondary to Abdominal pain/diarrhea  Total Time for Visit, including Counseling / Coordination of Care: 45 minutes Greater than 50% of this total time spent on direct patient counseling and coordination of care      Chief Complaint:  Abdominal pain    History of Present Illness:  Lul Ervni is a 66 y o  female with a PMH of gastroparesis, type 2 DM, HTN, depression, CKD, who presents with 4 days of abdominal pain, diarrhea and nausea  Patient denies any recent changes with medications or food, exposure to sick contacts, recent travel, recent antibiotic use or illness, unsure what brought this on  Abdominal pain is localized to lower quadrants, no radiation  Patient reports that she has multiple, up to 20 episodes of diarrhea per day, has had very poor oral intake, stating that she almost immediately has diarrhea after having food/fluids  Reports nausea, no episodes of vomiting  Patient has history of gastroparesis, follows with GI outpatient, typically presents with constipation, no episodes like this current  Review of Systems:  Review of Systems   Constitutional: Positive for appetite change  Negative for activity change, chills, diaphoresis, fatigue, fever and unexpected weight change  Respiratory: Negative for shortness of breath  Cardiovascular: Negative for chest pain and palpitations  Gastrointestinal: Positive for abdominal pain, diarrhea and nausea  Negative for blood in stool, constipation and vomiting  Genitourinary: Positive for decreased urine volume  Negative for difficulty urinating, dysuria, frequency, hematuria and urgency  Musculoskeletal: Negative for back pain and gait problem  Skin: Negative for wound  Neurological: Negative for dizziness, syncope, weakness and light-headedness         Past Medical and Surgical History:   Past Medical History:   Diagnosis Date    Allergic     Anesthesia     pt reports "had to use double lumen endo tube for hiatal hernia repair/so surgeon could get to where he needed to work"    Arthritis     Aspiration into airway     Basal cell carcinoma 2007    left cheek     BCC (basal cell carcinoma) 05/27/2021    Left Nasal tip    Cancer ([facility] [address] )     squamous cell cancer on forhead    Cancer ([facility] [address] )     basal cell on nose     Chronic kidney disease 2000, 2018    Stones, kidney disease stage 4    Chronic pain disorder     bilat feet and joint pain on occas    Colon polyp     COVID-19 08/2021    recovered at home/did receive monoclonal infusion    CPAP (continuous positive airway pressure) dependence     not using as has been recalled    Dental bridge present     Depression     Diabetes mellitus ([facility] [address] )     Type 2    Diabetic neuropathy (HCC)     Disease of thyroid gland     Family history of thyroid problem     Fatty liver     GERD (gastroesophageal reflux disease)     Heart burn     History of pneumonia     Hyperlipidemia     Hyperplasia, parathyroid ([facility] [address] )     Hypertension     Kidney problem     Kidney stone     Motion sickness     Obesity (BMI 30 0-34  9)     Pollen allergies     RLS (restless legs syndrome)     SCC (squamous cell carcinoma) 05/04/2021    left mid forehead    Seasonal allergies     Sleep apnea     uses CPAP    Squamous cell skin cancer 2007    left cheek     Swollen ankles     Wears glasses        Past Surgical History:   Procedure Laterality Date    ARTHROSCOPY KNEE      BREAST BIOPSY      stereotactic-benign    BREAST BIOPSY      stereo-benign    BREAST EXCISIONAL BIOPSY      unknown date-benign    BREAST EXCISIONAL BIOPSY      unknown date-benign    BREAST EXCISIONAL BIOPSY      unknown date-benign    BREAST EXCISIONAL BIOPSY      unknown age-benign    CHOLECYSTECTOMY      COLONOSCOPY      EXAMINATION UNDER ANESTHESIA N/A 6/24/2021    Procedure: EXAM UNDER ANESTHESIA (EUA), DISE;  Surgeon: Hakeem Leon MD;  [facility];  Service: ENT    HERNIA REPAIR      HIATAL HERNIA REPAIR      LIPOSUCTION      MOHS SURGERY  05/20/2021    left mid forehead-Gautam    MOHS SURGERY Left 05/27/2021    Left nasal tip- gautam     AZ INCISION IMPLANT CRANIAL NERVE STIM ELECTRODE/PULSE GEN N/A 11/10/2021    Procedure: INSERTION UPPER AIRWAY STIMULATOR, INSPIRE IMPLANT;  Surgeon: Hakeem Leon MD;  Location: [facility];  Service: ENT    REDUCTION MAMMAPLASTY Bilateral 2000    REDUCTION MAMMAPLASTY      SKIN BIOPSY      SKIN CANCER EXCISION  2007    squamous cell carcinoma     SKIN CANCER EXCISION  2007    basal cell carcinoma    SQUAMOUS CELL CARCINOMA EXCISION      TOE SURGERY      TONSILLECTOMY      TUBAL LIGATION      UPPER GASTROINTESTINAL ENDOSCOPY         Meds/Allergies:  Prior to Admission medications    Medication Sig Start Date End Date Taking? Authorizing Provider   acetaminophen (TYLENOL) 500 mg tablet Take 1,000 mg by mouth as needed     Historical Provider, MD   albuterol (Ventolin HFA) 90 mcg/act inhaler Inhale 2 puffs every 6 (six) hours as needed for wheezing or shortness of breath  Patient not taking: Reported on 12/22/2021 5/5/20   Rufina Peace PA-C   amLODIPine (NORVASC) 5 mg tablet Take 1 tablet (5 mg total) by mouth daily 3/23/22   Cleopatra Laguerre MD   b complex vitamins tablet Take 1 tablet by mouth daily     Historical Provider, MD CARREON ULTRAFINE III SHORT PEN 31G X 8 MM MISC USE AS DIRECTED 4 TIMES PER DAY 7/26/19   Historical Provider, MD   DULoxetine (Cymbalta) 30 mg delayed release capsule Take 30 mg by mouth daily 3/2/21 3/2/22  Historical Provider, MD   DULoxetine (CYMBALTA) 60 mg delayed release capsule Take 90 mg by mouth daily    Historical Provider, MD   famotidine (PEPCID) 20 mg tablet Take 2 tablets PO in AM and 1 tablet in PM  2/23/22   Yolanda Martínez PA-C   fluticasone (FLONASE) 50 mcg/act nasal spray 1-2 sprays into each nostril daily 1/13/22   Neil Allison DO   gabapentin (NEURONTIN) 100 mg capsule Take 100 mg by mouth daily 12/14/21   Historical Provider, MD   hydrALAZINE (APRESOLINE) 25 mg tablet TAKE 1 TABLET BY MOUTH TWICE A DAY 3/2/22   Sarah Francis MD   Icosapent Ethyl (Vascepa) 1 g CAPS Take 1 g by mouth 2 (two) times a day    Historical Provider, MD   insulin aspart (NovoLOG) 100 units/mL injection Inject under the skin 3 (three) times a day before meals 14 units, 14 units, 18 units      Historical Provider, MD   insulin detemir (Levemir FlexTouch) 100 Units/mL injection pen Inject 50 Units under the skin every evening  6/8/21   Historical Provider, MD   levothyroxine 137 mcg tablet Take 137 mcg by mouth 3/2/21 3/2/22  Historical Provider, MD   metoprolol tartrate (LOPRESSOR) 50 mg tablet Take 1 tablet (50 mg total) by mouth every 12 (twelve) hours 4/26/21   Viviane Carter MD   Multiple Vitamin (multivitamin) capsule Take 1 capsule by mouth daily    Historical Provider, MD   olmesartan (BENICAR) 40 mg tablet TAKE 1 TABLET BY MOUTH EVERY DAY 10/19/21   Britney Bardales PA-C   pramipexole (MIRAPEX) 0 25 mg tablet Take 1 tablet (0 25 mg total) by mouth daily at bedtime 12/22/21   Darolyn Dancer, DO   QUEtiapine (SEROquel) 25 mg tablet Take 1 tablet (25 mg total) by mouth daily at bedtime 8/4/21   Hayley Yo MD   rosuvastatin (CRESTOR) 10 MG tablet Take 20 mg by mouth daily   10/8/21 10/8/22  Historical Provider, MD   rosuvastatin (CRESTOR) 20 MG tablet  1/13/22   Historical Provider, MD   spironolactone (ALDACTONE) 25 mg tablet Take 1 tablet (25 mg total) by mouth 3 (three) times a week 2/16/22   Viviane Carter MD   torsemide (DEMADEX) 20 mg tablet TAKE 1 TABLET BY MOUTH EVERY DAY 7/16/21   Viviane Carter MD   Vitamin D, Ergocalciferol, 2000 units CAPS Take 2,000 Units by mouth daily     Historical Provider, MD     I have reviewed home medications with patient personally  Allergies:    Allergies   Allergen Reactions    Ciprofloxacin Hives and Itching       Social History:  Marital Status: /Civil Union   Occupation:  Retired  Patient Pre-hospital Living Situation: Home, With spouse, With other family member: Son  Patient Pre-hospital Level of Mobility: walks  Patient Pre-hospital Diet Restrictions:  Small, frequent meals  Substance Use History:   Social History     Substance and Sexual Activity   Alcohol Use Yes    Alcohol/week: 0 0 standard drinks    Comment: rarely a sip      Social History     Tobacco Use   Smoking Status Former Smoker    Packs/day: 2 00    Years: 10 00    Pack years: 20 00    Types: Cigarettes    Start date: 1967   Vani Jensenuce Quit date: 12    Years since quittin 4   Smokeless Tobacco Never Used     Social History     Substance and Sexual Activity   Drug Use No       Family History:  Family History   Problem Relation Age of Onset    Heart disease Mother     Depression Mother     Hypertension Mother     COPD Mother     Hearing loss Mother     Heart disease Father     Lung cancer Father 79        Smoker     Cancer Father         brain    Alcohol abuse Father     Dementia Father     Hypertension Father     Thyroid disease Father     COPD Father     Arthritis Father     Brain cancer Father 76    Hypertension Sister     Diabetes Sister     Heart disease Sister     Thyroid disease Sister     Cancer Sister         Lympoma    Lung cancer Sister 78    Hypertension Brother     Diabetes Brother     Cancer Brother         Throat    Dementia Brother     Stroke Brother     Hypertension Brother     No Known Problems Son     No Known Problems Son     Heart disease Brother     Diabetes Brother     Brain cancer Paternal Aunt         unknown age       Physical Exam:     Vitals:   Blood Pressure: 129/63 (22 1117)  Pulse: 70 (22 1117)  Temperature: 97 7 °F (36 5 °C) (22 1117)  Temp Source: Oral (22 1117)  Respirations: 18 (22 1117)  Height: 5' 3" (160 cm) (22 1117)  SpO2: 96 % (22 1117)    Physical Exam  Constitutional:       General: She is not in acute distress  Appearance: Normal appearance  She is not ill-appearing, toxic-appearing or diaphoretic  Cardiovascular:      Rate and Rhythm: Normal rate and regular rhythm  Pulses: Normal pulses  Heart sounds: Normal heart sounds  Pulmonary:      Effort: Pulmonary effort is normal  No respiratory distress  Breath sounds: Normal breath sounds     Abdominal:      General: There is no distension  Palpations: Abdomen is soft  Tenderness: There is abdominal tenderness  Comments: Hypoactive bowel sounds, tenderness to palpation of left lower quadrant   Musculoskeletal:         General: No swelling or tenderness  Skin:     General: Skin is warm and dry  Neurological:      General: No focal deficit present  Mental Status: She is alert and oriented to person, place, and time  Psychiatric:         Mood and Affect: Mood normal          Behavior: Behavior normal          Additional Data:     Lab Results:  Results from last 7 days   Lab Units 03/30/22  1155   WBC Thousand/uL 10 49*   HEMOGLOBIN g/dL 13 5   HEMATOCRIT % 41 2   PLATELETS Thousands/uL 247   NEUTROS PCT % 67   LYMPHS PCT % 23   MONOS PCT % 6   EOS PCT % 3     Results from last 7 days   Lab Units 03/30/22  1155   SODIUM mmol/L 134*   POTASSIUM mmol/L 4 3   CHLORIDE mmol/L 99*   CO2 mmol/L 25   BUN mg/dL 76*   CREATININE mg/dL 2 80*   ANION GAP mmol/L 10   CALCIUM mg/dL 9 0   ALBUMIN g/dL 3 7   TOTAL BILIRUBIN mg/dL 0 37   ALK PHOS U/L 93   ALT U/L 72   AST U/L 33   GLUCOSE RANDOM mg/dL 171*                       Imaging: Reviewed radiology reports from this admission including: abdominal/pelvic CT  CT abdomen pelvis wo contrast   Final Result by Pamela Allan MD (03/30 1341)      No acute inflammatory changes in the abdomen or pelvis  Workstation performed: [id number]             EKG and Other Studies Reviewed on Admission:   · EKG: No EKG obtained  ** Please Note: This note has been constructed using a voice recognition system   **

## 2022-03-30 NOTE — ASSESSMENT & PLAN NOTE
· Patient reports abdominal pain, diarrhea and nausea X 4 days  Reports yellowy, loose/watery diarrhea more than 20 times per day, poor oral intake, states she has a bowel movement almost immediately after trying to eat/drink  · History gastroparesis, follows GI outpatient and has had Botox injections in the past  · Denies recent antibiotic use, infections, illness  · CT a/p without acute findings  · UA with moderate leukocytes, follow urine culture results  · Mild leukocytosis, does not meet SIRS/sepsis criteria  · Check C   Diff, stool cultures  · Check procalcitonin  · IV ceftriaxone empirically for now  · Continue IV fluids with Plasma-Lyte given ANDRA  · NPO sips with meds for now, advance as tolerated  · GI consult given history gastroparesis

## 2022-03-30 NOTE — ED PROVIDER NOTES
History  Chief Complaint   Patient presents with    Abdominal Pain     Pt reports L sided lower ABD pain and clear yellow diarrhea x 4 days  Denies urinary symptoms  Pt reports not being able to keep food or drink down  Denies CP/SOB  Hx diabetes, gastric paresis, hernia     The patient is a 65-year-old female with history of hypertension, hyperlipidemia, diabetes and CKD who presents to the emergency department for evaluation of abdominal pain and diarrhea for the last 4 days  The patient states that she is having lower abdominal pain, worse in the left lower side for the last 4 days  She is having associated watery, yellow diarrhea  She states that she goes multiple times per day  She reports that if she tries to eat or drink anything, she will almost immediately have an episode of diarrhea  She also reports some associated nausea, but she denies any vomiting  No one at home is sick with similar symptoms  She denies any recent hospitalizations or antibiotic use  She states that she feels like she is getting dehydrated  She denies fever, chills, chest pain, shortness of breath, dysuria, headache, dizziness or lightheadedness  History provided by:  Patient   used: No    Abdominal Pain  Associated symptoms: diarrhea and nausea    Associated symptoms: no chest pain, no chills, no cough, no dysuria, no fever, no hematuria, no shortness of breath, no sore throat and no vomiting        Prior to Admission Medications   Prescriptions Last Dose Informant Patient Reported? Taking?    B-D ULTRAFINE III SHORT PEN 31G X 8 MM MISC  Self Yes No   Sig: USE AS DIRECTED 4 TIMES PER DAY   DULoxetine (CYMBALTA) 60 mg delayed release capsule  Self Yes No   Sig: Take 90 mg by mouth daily   DULoxetine (Cymbalta) 30 mg delayed release capsule  Self Yes No   Sig: Take 30 mg by mouth daily   Icosapent Ethyl (Vascepa) 1 g CAPS  Self Yes No   Sig: Take 1 g by mouth 2 (two) times a day   Multiple Vitamin (multivitamin) capsule  Self Yes No   Sig: Take 1 capsule by mouth daily   QUEtiapine (SEROquel) 25 mg tablet  Self No No   Sig: Take 1 tablet (25 mg total) by mouth daily at bedtime   Vitamin D, Ergocalciferol, 2000 units CAPS  Self Yes No   Sig: Take 2,000 Units by mouth daily    acetaminophen (TYLENOL) 500 mg tablet  Self Yes No   Sig: Take 1,000 mg by mouth as needed    albuterol (Ventolin HFA) 90 mcg/act inhaler  Self No No   Sig: Inhale 2 puffs every 6 (six) hours as needed for wheezing or shortness of breath   Patient not taking: Reported on 2021    amLODIPine (NORVASC) 5 mg tablet   No No   Sig: Take 1 tablet (5 mg total) by mouth daily   b complex vitamins tablet  Self Yes No   Sig: Take 1 tablet by mouth daily    famotidine (PEPCID) 20 mg tablet   No No   Sig: Take 2 tablets PO in AM and 1 tablet in PM    fluticasone (FLONASE) 50 mcg/act nasal spray  Self No No   Si-2 sprays into each nostril daily   gabapentin (NEURONTIN) 100 mg capsule  Self Yes No   Sig: Take 100 mg by mouth daily   hydrALAZINE (APRESOLINE) 25 mg tablet   No No   Sig: TAKE 1 TABLET BY MOUTH TWICE A DAY   insulin aspart (NovoLOG) 100 units/mL injection  Self Yes No   Sig: Inject under the skin 3 (three) times a day before meals 14 units, 14 units, 18 units     insulin detemir (Levemir FlexTouch) 100 Units/mL injection pen  Self Yes No   Sig: Inject 50 Units under the skin every evening    levothyroxine 137 mcg tablet  Self Yes No   Sig: Take 137 mcg by mouth   metoprolol tartrate (LOPRESSOR) 50 mg tablet  Self No No   Sig: Take 1 tablet (50 mg total) by mouth every 12 (twelve) hours   olmesartan (BENICAR) 40 mg tablet  Self No No   Sig: TAKE 1 TABLET BY MOUTH EVERY DAY   pramipexole (MIRAPEX) 0 25 mg tablet  Self No No   Sig: Take 1 tablet (0 25 mg total) by mouth daily at bedtime   rosuvastatin (CRESTOR) 10 MG tablet  Self Yes No   Sig: Take 20 mg by mouth daily     rosuvastatin (CRESTOR) 20 MG tablet   Yes No   spironolactone (ALDACTONE) 25 mg tablet   No No   Sig: Take 1 tablet (25 mg total) by mouth 3 (three) times a week   torsemide (DEMADEX) 20 mg tablet  Self No No   Sig: TAKE 1 TABLET BY MOUTH EVERY DAY      Facility-Administered Medications: None       Past Medical History:   Diagnosis Date    Allergic     Anesthesia     pt reports "had to use double lumen endo tube for hiatal hernia repair/so surgeon could get to where he needed to work"    Arthritis     Aspiration into airway     Basal cell carcinoma 2007    left cheek     BCC (basal cell carcinoma) 05/27/2021    Left Nasal tip    Cancer ([facility] )     squamous cell cancer on forhead    Cancer ([facility] )     basal cell on nose     Chronic kidney disease 2000, 2018    Stones, kidney disease stage 4    Chronic pain disorder     bilat feet and joint pain on occas    Colon polyp     COVID-19 08/2021    recovered at home/did receive monoclonal infusion    CPAP (continuous positive airway pressure) dependence     not using as has been recalled    Dental bridge present     Depression     Diabetes mellitus ([place] [address] )     Type 2    Diabetic neuropathy (HCC)     Disease of thyroid gland     Family history of thyroid problem     Fatty liver     GERD (gastroesophageal reflux disease)     Heart burn     History of pneumonia     Hyperlipidemia     Hyperplasia, parathyroid ([place] [address] )     Hypertension     Kidney problem     Kidney stone     Motion sickness     Obesity (BMI 30 0-34  9)     Pollen allergies     RLS (restless legs syndrome)     SCC (squamous cell carcinoma) 05/04/2021    left mid forehead    Seasonal allergies     Sleep apnea     uses CPAP    Squamous cell skin cancer 2007    left cheek     Swollen ankles     Wears glasses        Past Surgical History:   Procedure Laterality Date    ARTHROSCOPY KNEE      BREAST BIOPSY      stereotactic-benign    BREAST BIOPSY      stereo-benign    BREAST EXCISIONAL BIOPSY      unknown date-benign    BREAST EXCISIONAL BIOPSY unknown date-benign    BREAST EXCISIONAL BIOPSY      unknown date-benign    BREAST EXCISIONAL BIOPSY      unknown age-benign    CHOLECYSTECTOMY      COLONOSCOPY      EXAMINATION UNDER ANESTHESIA N/A 6/24/2021    Procedure: EXAM UNDER ANESTHESIA (EUA), DISE;  Surgeon: Jones Matson MD;  [facility];  Service: ENT    HERNIA REPAIR      HIATAL HERNIA REPAIR      LIPOSUCTION      MOHS SURGERY  05/20/2021    left mid forehead-Gautam    MOHS SURGERY Left 05/27/2021    Left nasal tip- gautam     MN INCISION IMPLANT CRANIAL NERVE STIM ELECTRODE/PULSE GEN N/A 11/10/2021    Procedure: INSERTION UPPER AIRWAY STIMULATOR, INSPIRE IMPLANT;  Surgeon: Jones Matson MD;  Location: [facility];  Service: ENT    REDUCTION MAMMAPLASTY Bilateral 2000    REDUCTION MAMMAPLASTY      SKIN BIOPSY      SKIN CANCER EXCISION  2007    squamous cell carcinoma     SKIN CANCER EXCISION  2007    basal cell carcinoma    SQUAMOUS CELL CARCINOMA EXCISION      TOE SURGERY      TONSILLECTOMY      TUBAL LIGATION      UPPER GASTROINTESTINAL ENDOSCOPY         Family History   Problem Relation Age of Onset    Heart disease Mother     Depression Mother     Hypertension Mother     COPD Mother     Hearing loss Mother     Heart disease Father     Lung cancer Father 79        Smoker     Cancer Father         brain    Alcohol abuse Father     Dementia Father     Hypertension Father     Thyroid disease Father     COPD Father     Arthritis Father     Brain cancer Father 76    Hypertension Sister     Diabetes Sister     Heart disease Sister     Thyroid disease Sister     Cancer Sister         Lympoma    Lung cancer Sister 78    Hypertension Brother     Diabetes Brother     Cancer Brother         Throat    Dementia Brother     Stroke Brother     Hypertension Brother     No Known Problems Son     No Known Problems Son     Heart disease Brother     Diabetes Brother     Brain cancer Paternal Aunt unknown age     I have reviewed and agree with the history as documented  E-Cigarette/Vaping    E-Cigarette Use Never User      E-Cigarette/Vaping Substances    Nicotine No     THC No     CBD No     Flavoring No     Other No     Unknown No      Social History     Tobacco Use    Smoking status: Former Smoker     Packs/day: 2 00     Years: 10 00     Pack years: 20 00     Types: Cigarettes     Start date: 1967     Quit date:      Years since quittin 4    Smokeless tobacco: Never Used   Vaping Use    Vaping Use: Never used   Substance Use Topics    Alcohol use: Yes     Alcohol/week: 0 0 standard drinks     Comment: rarely a sip     Drug use: No       Review of Systems   Constitutional: Negative for chills and fever  HENT: Negative for ear pain and sore throat  Eyes: Negative for redness and visual disturbance  Respiratory: Negative for cough and shortness of breath  Cardiovascular: Negative for chest pain  Gastrointestinal: Positive for abdominal pain, diarrhea and nausea  Negative for vomiting  Genitourinary: Negative for dysuria and hematuria  Musculoskeletal: Negative for back pain, neck pain and neck stiffness  Skin: Negative for color change and rash  Neurological: Negative for dizziness, light-headedness and headaches  All other systems reviewed and are negative  Physical Exam  Physical Exam  Vitals and nursing note reviewed  Constitutional:       General: She is not in acute distress  Appearance: She is well-developed  She is not ill-appearing or toxic-appearing  HENT:      Head: Normocephalic and atraumatic  Mouth/Throat:      Mouth: Mucous membranes are moist       Pharynx: Uvula midline  Eyes:      General: Lids are normal       Conjunctiva/sclera: Conjunctivae normal    Cardiovascular:      Rate and Rhythm: Normal rate and regular rhythm  Heart sounds: Normal heart sounds     Pulmonary:      Effort: Pulmonary effort is normal  Breath sounds: Normal breath sounds  Abdominal:      General: There is no distension  Palpations: Abdomen is soft  Tenderness: There is abdominal tenderness in the right lower quadrant, suprapubic area and left lower quadrant  Musculoskeletal:      Cervical back: Normal range of motion and neck supple  Skin:     General: Skin is warm and dry  Neurological:      Mental Status: She is alert and oriented to person, place, and time           Vital Signs  ED Triage Vitals [03/30/22 1117]   Temperature Pulse Respirations Blood Pressure SpO2   97 7 °F (36 5 °C) 70 18 129/63 96 %      Temp Source Heart Rate Source Patient Position - Orthostatic VS BP Location FiO2 (%)   Oral Monitor Sitting Left arm --      Pain Score       3           Vitals:    03/30/22 1117 03/30/22 1613 03/30/22 1701   BP: 129/63 116/59 133/66   Pulse: 70 78 68   Patient Position - Orthostatic VS: Sitting Lying          Visual Acuity      ED Medications  Medications   DULoxetine (CYMBALTA) 90 mg (has no administration in time range)   atorvastatin (LIPITOR) tablet 40 mg (40 mg Oral Not Given 3/30/22 1619)   albuterol (PROVENTIL HFA,VENTOLIN HFA) inhaler 2 puff (has no administration in time range)   famotidine (PEPCID) tablet 10 mg (has no administration in time range)   fluticasone (FLONASE) 50 mcg/act nasal spray 1 spray (1 spray Nasal Given 3/30/22 1607)   metoprolol tartrate (LOPRESSOR) tablet 50 mg (has no administration in time range)   acetaminophen (TYLENOL) tablet 650 mg (has no administration in time range)   multi-electrolyte (PLASMALYTE-A/ISOLYTE-S PH 7 4) IV solution (100 mL/hr Intravenous New Bag 3/30/22 1658)   ondansetron (ZOFRAN) injection 4 mg (has no administration in time range)   aluminum-magnesium hydroxide-simethicone (MYLANTA) oral suspension 30 mL (30 mL Oral Given 3/30/22 1607)   heparin (porcine) subcutaneous injection 5,000 Units (5,000 Units Subcutaneous Given 3/30/22 1607)   ceftriaxone (ROCEPHIN) 1 g/50 mL in dextrose IVPB (1,000 mg Intravenous New Bag 3/30/22 1607)   insulin lispro (HumaLOG) 100 units/mL subcutaneous injection 1-6 Units (1 Units Subcutaneous Not Given 3/30/22 1829)   amLODIPine (NORVASC) tablet 5 mg (has no administration in time range)   hydrALAZINE (APRESOLINE) tablet 25 mg (25 mg Oral Given 3/30/22 1615)   fish oil capsule 1,000 mg (1,000 mg Oral Given 3/30/22 1827)   metroNIDAZOLE (FLAGYL) tablet 500 mg (500 mg Oral Given 3/30/22 1827)   insulin detemir (LEVEMIR) subcutaneous injection 30 Units (has no administration in time range)   saccharomyces boulardii (FLORASTOR) capsule 250 mg (250 mg Oral Given 3/30/22 1831)   levothyroxine tablet 125 mcg (has no administration in time range)   sodium chloride 0 9 % bolus 1,000 mL (0 mL Intravenous Stopped 3/30/22 1647)   acetaminophen (TYLENOL) tablet 650 mg (650 mg Oral Given 3/30/22 1309)       Diagnostic Studies  Results Reviewed     Procedure Component Value Units Date/Time    Procalcitonin [[id number]]  (Normal) Collected: 03/30/22 1155    Lab Status: Final result Specimen: Blood from Arm, Right Updated: 03/30/22 1720     Procalcitonin 0 09 ng/ml     TSH, 3rd generation [[id number]]  (Abnormal) Collected: 03/30/22 1155    Lab Status: Final result Specimen: Blood from Arm, Right Updated: 03/30/22 1653     TSH 3RD GENERATON 0 043 uIU/mL     Narrative:      Patients undergoing fluorescein dye angiography may retain small amounts of fluorescein in the body for 48-72 hours post procedure  Samples containing fluorescein can produce falsely depressed TSH values  If the patient had this procedure,a specimen should be resubmitted post fluorescein clearance        Urine Microscopic [[id number]]  (Abnormal) Collected: 03/30/22 1448    Lab Status: Final result Specimen: Urine, Clean Catch Updated: 03/30/22 1556     RBC, UA None Seen /hpf      WBC, UA 20-30 /hpf      Epithelial Cells Occasional /hpf      Bacteria, UA Moderate /hpf     Urine culture [[id number]] Collected: 03/30/22 1448    Lab Status:  In process Specimen: Urine, Clean Catch Updated: 03/30/22 1556    Platelet count [[id number]]     Lab Status: No result Specimen: Blood     Urine Macroscopic, POC [[id number]]  (Abnormal) Collected: 03/30/22 1448    Lab Status: Final result Specimen: Urine Updated: 03/30/22 1450     Color, UA Yellow     Clarity, UA Clear     pH, UA 5 0     Leukocytes, UA Moderate     Nitrite, UA Negative     Protein, UA Trace mg/dl      Glucose, UA Negative mg/dl      Ketones, UA Negative mg/dl      Urobilinogen, UA 0 2 E U /dl      Bilirubin, UA Negative     Blood, UA Negative     Specific Gravity, UA 1 015    Narrative:      CLINITEK RESULT    Comprehensive metabolic panel [[id number]]  (Abnormal) Collected: 03/30/22 1155    Lab Status: Final result Specimen: Blood from Arm, Right Updated: 03/30/22 1227     Sodium 134 mmol/L      Potassium 4 3 mmol/L      Chloride 99 mmol/L      CO2 25 mmol/L      ANION GAP 10 mmol/L      BUN 76 mg/dL      Creatinine 2 80 mg/dL      Glucose 171 mg/dL      Calcium 9 0 mg/dL      AST 33 U/L      ALT 72 U/L      Alkaline Phosphatase 93 U/L      Total Protein 7 1 g/dL      Albumin 3 7 g/dL      Total Bilirubin 0 37 mg/dL      eGFR 16 ml/min/1 73sq m     Narrative:      National Kidney Disease Foundation guidelines for Chronic Kidney Disease (CKD):     Stage 1 with normal or high GFR (GFR > 90 mL/min/1 73 square meters)    Stage 2 Mild CKD (GFR = 60-89 mL/min/1 73 square meters)    Stage 3A Moderate CKD (GFR = 45-59 mL/min/1 73 square meters)    Stage 3B Moderate CKD (GFR = 30-44 mL/min/1 73 square meters)    Stage 4 Severe CKD (GFR = 15-29 mL/min/1 73 square meters)    Stage 5 End Stage CKD (GFR <15 mL/min/1 73 square meters)  Note: GFR calculation is accurate only with a steady state creatinine    Lipase [[id number]]  (Abnormal) Collected: 03/30/22 1155    Lab Status: Final result Specimen: Blood from Arm, Right Updated: 03/30/22 1227     Lipase 61 u/L CBC and differential [[id number]]  (Abnormal) Collected: 03/30/22 1155    Lab Status: Final result Specimen: Blood from Arm, Right Updated: 03/30/22 1214     WBC 10 49 Thousand/uL      RBC 4 52 Million/uL      Hemoglobin 13 5 g/dL      Hematocrit 41 2 %      MCV 91 fL      MCH 29 9 pg      MCHC 32 8 g/dL      RDW 14 0 %      MPV 11 7 fL      Platelets 058 Thousands/uL      nRBC 0 /100 WBCs      Neutrophils Relative 67 %      Immat GRANS % 1 %      Lymphocytes Relative 23 %      Monocytes Relative 6 %      Eosinophils Relative 3 %      Basophils Relative 0 %      Neutrophils Absolute 7 06 Thousands/µL      Immature Grans Absolute 0 05 Thousand/uL      Lymphocytes Absolute 2 37 Thousands/µL      Monocytes Absolute 0 67 Thousand/µL      Eosinophils Absolute 0 31 Thousand/µL      Basophils Absolute 0 03 Thousands/µL     Clostridium difficile toxin by PCR with EIA [[id number]]     Lab Status: No result Specimen: Stool     Stool Enteric Bacterial Panel by PCR [[id number]]     Lab Status: No result Specimen: Stool                  CT abdomen pelvis wo contrast   Final Result by Boogie Rick MD (03/30 1411)      No acute inflammatory changes in the abdomen or pelvis  Workstation performed: [id number]                    Procedures  Procedures         ED Course  ED Course as of 03/30/22 2032   Wed Mar 30, 2022   1245 Creatinine(!): 2 80  Consistent with ANDRA   1245 Sodium(!): 134   1245 Switched CT of abdomen and pelvis to noncontrast due to GFR of 16   1312 Discussed results with patient as far  She is requesting some Tylenol for hip pain  Pending CT scan                                               MDM  Number of Diagnoses or Management Options  Abdominal pain: new and requires workup  ANDRA (acute kidney injury) ([facility] ): new and requires workup  Diarrhea: new and requires workup  Diagnosis management comments: Patient presents for evaluation of abdominal pain and diarrhea for the last 4 days     Differential includes but is not limited to gastroenteritis versus colitis versus diverticulitis versus appendicitis versus cystitis versus pyelonephritis  Labs and CT were ordered  Labs reviewed  Most notable for elevated creatinine consistent with an ANDRA at this time  Patient went from 1 7 to 2 8 in the last 4 months  Due to the patient's GFR, had to obtain a noncontrast CT of abdomen and pelvis today  It did not show any acute findings  As the patient appears significantly dry and due to the ANDRA, the decision was made to admit the patient today  I discussed this with the patient, and she is agreeable to admission at this time  Orders for stool sample collection are placed  Case was discussed with [facility] who agreed to accept the patient under the service of Dr Francoise Courtney  Patient is stable for admission         Amount and/or Complexity of Data Reviewed  Clinical lab tests: ordered and reviewed  Tests in the radiology section of CPT®: ordered and reviewed  Decide to obtain previous medical records or to obtain history from someone other than the patient: yes  Review and summarize past medical records: yes    Risk of Complications, Morbidity, and/or Mortality  Presenting problems: moderate  Diagnostic procedures: low  Management options: low    Patient Progress  Patient progress: stable      Disposition  Final diagnoses:   Abdominal pain   Diarrhea   ANDRA (acute kidney injury) ([place] [address] )     Time reflects when diagnosis was documented in both MDM as applicable and the Disposition within this note     Time User Action Codes Description Comment    3/30/2022  2:32 PM Sykes Camden Add [R10 9] Abdominal pain     3/30/2022  2:32 PM Bev Jordan Add [R19 7] Diarrhea     3/30/2022  2:32 PM Bev Jordan Add [N17 9] ANDRA (acute kidney injury) ([facility] )     3/30/2022  2:32 PM Sykes Camden Modify [R10 9] Abdominal pain     3/30/2022  2:32 PM Bev Jordan Modify [N17 9] ANDRA (acute kidney injury) ([facility] )     3/30/2022  3:51 PM Raji Louis Add [E11 43,  K31 84] Gastroparesis diabeticorum [facility])       ED Disposition     ED Disposition Condition Date/Time Comment    Admit Stable Wed Mar 30, 2022  2:32 PM Case was discussed with [facility] and the patient's admission status was agreed to be Admission Status: inpatient status to the service of Dr Bijal Parker   Follow-up Information    None         Current Discharge Medication List      CONTINUE these medications which have NOT CHANGED    Details   acetaminophen (TYLENOL) 500 mg tablet Take 1,000 mg by mouth as needed       albuterol (Ventolin HFA) 90 mcg/act inhaler Inhale 2 puffs every 6 (six) hours as needed for wheezing or shortness of breath  Refills: 0    Comments: Substitution to a formulary equivalent within the same pharmaceutical class is authorized    Associated Diagnoses: Acute respiratory failure with hypoxia (HCC)      amLODIPine (NORVASC) 5 mg tablet Take 1 tablet (5 mg total) by mouth daily  Qty: 90 tablet, Refills: 0    Associated Diagnoses: Hypertensive chronic kidney disease with stage 1 through stage 4 chronic kidney disease, or unspecified chronic kidney disease; Essential hypertension; Chronic kidney disease, stage III (moderate) (HCC)      b complex vitamins tablet Take 1 tablet by mouth daily       B-D ULTRAFINE III SHORT PEN 31G X 8 MM MISC USE AS DIRECTED 4 TIMES PER DAY  Refills: 3      DULoxetine (CYMBALTA) 60 mg delayed release capsule Take 90 mg by mouth daily      famotidine (PEPCID) 20 mg tablet Take 2 tablets PO in AM and 1 tablet in PM   Qty: 270 tablet, Refills: 1    Associated Diagnoses: Gastroesophageal reflux disease, unspecified whether esophagitis present      fluticasone (FLONASE) 50 mcg/act nasal spray 1-2 sprays into each nostril daily  Qty: 18 2 mL, Refills: 5    Associated Diagnoses: Seasonal allergies      gabapentin (NEURONTIN) 100 mg capsule Take 100 mg by mouth daily      hydrALAZINE (APRESOLINE) 25 mg tablet TAKE 1 TABLET BY MOUTH TWICE A DAY  Qty: 180 tablet, Refills: 1    Associated Diagnoses: Hypertensive chronic kidney disease with stage 1 through stage 4 chronic kidney disease, or unspecified chronic kidney disease; Stage 4 chronic kidney disease ([facility] ); Type 2 diabetes mellitus with chronic kidney disease, with long-term current use of insulin, unspecified CKD stage ([facility] ); Anemia in stage 4 chronic kidney disease ([facility] ); Vitamin D deficiency; Persistent proteinuria; Dyslipidemia      Icosapent Ethyl (Vascepa) 1 g CAPS Take 1 g by mouth 2 (two) times a day      insulin aspart (NovoLOG) 100 units/mL injection Inject under the skin 3 (three) times a day before meals 14 units, 14 units, 18 units  insulin detemir (Levemir FlexTouch) 100 Units/mL injection pen Inject 50 Units under the skin every evening       levothyroxine 137 mcg tablet Take 137 mcg by mouth      metoprolol tartrate (LOPRESSOR) 50 mg tablet Take 1 tablet (50 mg total) by mouth every 12 (twelve) hours  Qty: 180 tablet, Refills: 3    Associated Diagnoses: Essential hypertension      Multiple Vitamin (multivitamin) capsule Take 1 capsule by mouth daily      olmesartan (BENICAR) 40 mg tablet TAKE 1 TABLET BY MOUTH EVERY DAY  Qty: 90 tablet, Refills: 3    Associated Diagnoses: Benign hypertension with CKD (chronic kidney disease) stage III (HCC)      pramipexole (MIRAPEX) 0 25 mg tablet Take 1 tablet (0 25 mg total) by mouth daily at bedtime  Qty: 90 tablet, Refills: 3    Associated Diagnoses: RLS (restless legs syndrome)      QUEtiapine (SEROquel) 25 mg tablet Take 1 tablet (25 mg total) by mouth daily at bedtime  Qty: 30 tablet, Refills: 5    Associated Diagnoses: Depression, recurrent (HCC)      ! ! rosuvastatin (CRESTOR) 10 MG tablet Take 20 mg by mouth daily        !! rosuvastatin (CRESTOR) 20 MG tablet       spironolactone (ALDACTONE) 25 mg tablet Take 1 tablet (25 mg total) by mouth 3 (three) times a week  Qty: 15 tablet, Refills: 5 Comments: 3 TIMES A WEEK EVERY MONDAY WEDNESDAY AND FRIDAY  Associated Diagnoses: Hypertensive chronic kidney disease with stage 1 through stage 4 chronic kidney disease, or unspecified chronic kidney disease; Stage 4 chronic kidney disease ([facility] ); Anemia in stage 4 chronic kidney disease ([facility] ); Diabetic nephropathy associated with type 2 diabetes mellitus ([facility] ); Dyslipidemia; Vitamin D deficiency      torsemide (DEMADEX) 20 mg tablet TAKE 1 TABLET BY MOUTH EVERY DAY  Qty: 90 tablet, Refills: 3    Associated Diagnoses: Hypertensive chronic kidney disease with stage 1 through stage 4 chronic kidney disease, or unspecified chronic kidney disease; CKD (chronic kidney disease) stage 3, GFR 30-59 ml/min ([facility])      Vitamin D, Ergocalciferol, 2000 units CAPS Take 2,000 Units by mouth daily        ! ! - Potential duplicate medications found  Please discuss with provider  No discharge procedures on file      PDMP Review       Value Time User    PDMP Reviewed  Yes 11/10/2021  3:47 PM Haider Gonzáles MD          ED Provider  Electronically Signed by           Diane Valencia PA-C  03/30/22 2032

## 2022-03-30 NOTE — ASSESSMENT & PLAN NOTE
Lab Results   Component Value Date    EGFR 16 03/30/2022    EGFR 28 11/17/2021    EGFR 26 11/11/2021    CREATININE 2 80 (H) 03/30/2022    CREATININE 1 76 (H) 11/17/2021    CREATININE 1 90 (H) 11/11/2021   · BP stable on admission  · Hold home Aldactone, Mirapex, torsemide in setting of ANDRA  · Continue hydralazine, Norvasc

## 2022-03-31 PROBLEM — R79.89 ABNORMAL TSH: Status: ACTIVE | Noted: 2022-03-31

## 2022-03-31 LAB
ALBUMIN SERPL BCP-MCNC: 3.2 G/DL (ref 3.5–5)
ALP SERPL-CCNC: 82 U/L (ref 46–116)
ALT SERPL W P-5'-P-CCNC: 60 U/L (ref 12–78)
ANION GAP SERPL CALCULATED.3IONS-SCNC: 10 MMOL/L (ref 4–13)
AST SERPL W P-5'-P-CCNC: 28 U/L (ref 5–45)
BASOPHILS # BLD AUTO: 0.02 THOUSANDS/ΜL (ref 0–0.1)
BASOPHILS NFR BLD AUTO: 0 % (ref 0–1)
BILIRUB SERPL-MCNC: 0.29 MG/DL (ref 0.2–1)
BUN SERPL-MCNC: 56 MG/DL (ref 5–25)
C DIFF TOX GENS STL QL NAA+PROBE: NEGATIVE
CALCIUM ALBUM COR SERPL-MCNC: 9 MG/DL (ref 8.3–10.1)
CALCIUM SERPL-MCNC: 8.4 MG/DL (ref 8.3–10.1)
CAMPYLOBACTER DNA SPEC NAA+PROBE: NORMAL
CHLORIDE SERPL-SCNC: 107 MMOL/L (ref 100–108)
CO2 SERPL-SCNC: 25 MMOL/L (ref 21–32)
CREAT SERPL-MCNC: 2.02 MG/DL (ref 0.6–1.3)
EOSINOPHIL # BLD AUTO: 0.26 THOUSAND/ΜL (ref 0–0.61)
EOSINOPHIL NFR BLD AUTO: 3 % (ref 0–6)
ERYTHROCYTE [DISTWIDTH] IN BLOOD BY AUTOMATED COUNT: 14 % (ref 11.6–15.1)
GFR SERPL CREATININE-BSD FRML MDRD: 24 ML/MIN/1.73SQ M
GLUCOSE SERPL-MCNC: 116 MG/DL (ref 65–140)
GLUCOSE SERPL-MCNC: 117 MG/DL (ref 65–140)
GLUCOSE SERPL-MCNC: 160 MG/DL (ref 65–140)
GLUCOSE SERPL-MCNC: 173 MG/DL (ref 65–140)
GLUCOSE SERPL-MCNC: 253 MG/DL (ref 65–140)
HCT VFR BLD AUTO: 35.9 % (ref 34.8–46.1)
HGB BLD-MCNC: 11.9 G/DL (ref 11.5–15.4)
IMM GRANULOCYTES # BLD AUTO: 0.04 THOUSAND/UL (ref 0–0.2)
IMM GRANULOCYTES NFR BLD AUTO: 1 % (ref 0–2)
LYMPHOCYTES # BLD AUTO: 2.13 THOUSANDS/ΜL (ref 0.6–4.47)
LYMPHOCYTES NFR BLD AUTO: 25 % (ref 14–44)
MAGNESIUM SERPL-MCNC: 3.3 MG/DL (ref 1.6–2.6)
MCH RBC QN AUTO: 30 PG (ref 26.8–34.3)
MCHC RBC AUTO-ENTMCNC: 33.1 G/DL (ref 31.4–37.4)
MCV RBC AUTO: 90 FL (ref 82–98)
MONOCYTES # BLD AUTO: 0.56 THOUSAND/ΜL (ref 0.17–1.22)
MONOCYTES NFR BLD AUTO: 7 % (ref 4–12)
NEUTROPHILS # BLD AUTO: 5.37 THOUSANDS/ΜL (ref 1.85–7.62)
NEUTS SEG NFR BLD AUTO: 64 % (ref 43–75)
NRBC BLD AUTO-RTO: 0 /100 WBCS
PLATELET # BLD AUTO: 227 THOUSANDS/UL (ref 149–390)
PMV BLD AUTO: 11.5 FL (ref 8.9–12.7)
POTASSIUM SERPL-SCNC: 4.5 MMOL/L (ref 3.5–5.3)
PROCALCITONIN SERPL-MCNC: 0.07 NG/ML
PROT SERPL-MCNC: 6.2 G/DL (ref 6.4–8.2)
RBC # BLD AUTO: 3.97 MILLION/UL (ref 3.81–5.12)
SALMONELLA DNA SPEC QL NAA+PROBE: NORMAL
SHIGA TOXIN STX GENE SPEC NAA+PROBE: NORMAL
SHIGELLA DNA SPEC QL NAA+PROBE: NORMAL
SODIUM SERPL-SCNC: 142 MMOL/L (ref 136–145)
T4 FREE SERPL-MCNC: 1.16 NG/DL (ref 0.76–1.46)
TSH SERPL DL<=0.05 MIU/L-ACNC: 0.05 UIU/ML (ref 0.36–3.74)
WBC # BLD AUTO: 8.38 THOUSAND/UL (ref 4.31–10.16)

## 2022-03-31 PROCEDURE — 84145 PROCALCITONIN (PCT): CPT | Performed by: PHYSICIAN ASSISTANT

## 2022-03-31 PROCEDURE — 99232 SBSQ HOSP IP/OBS MODERATE 35: CPT | Performed by: INTERNAL MEDICINE

## 2022-03-31 PROCEDURE — 85025 COMPLETE CBC W/AUTO DIFF WBC: CPT | Performed by: PHYSICIAN ASSISTANT

## 2022-03-31 PROCEDURE — 84443 ASSAY THYROID STIM HORMONE: CPT | Performed by: INTERNAL MEDICINE

## 2022-03-31 PROCEDURE — 82948 REAGENT STRIP/BLOOD GLUCOSE: CPT

## 2022-03-31 PROCEDURE — 87505 NFCT AGENT DETECTION GI: CPT | Performed by: PHYSICIAN ASSISTANT

## 2022-03-31 PROCEDURE — 84439 ASSAY OF FREE THYROXINE: CPT | Performed by: INTERNAL MEDICINE

## 2022-03-31 PROCEDURE — 99223 1ST HOSP IP/OBS HIGH 75: CPT | Performed by: INTERNAL MEDICINE

## 2022-03-31 PROCEDURE — 80053 COMPREHEN METABOLIC PANEL: CPT | Performed by: PHYSICIAN ASSISTANT

## 2022-03-31 PROCEDURE — 83735 ASSAY OF MAGNESIUM: CPT | Performed by: PHYSICIAN ASSISTANT

## 2022-03-31 PROCEDURE — 89055 LEUKOCYTE ASSESSMENT FECAL: CPT | Performed by: INTERNAL MEDICINE

## 2022-03-31 PROCEDURE — 87493 C DIFF AMPLIFIED PROBE: CPT | Performed by: PHYSICIAN ASSISTANT

## 2022-03-31 RX ORDER — FAMOTIDINE 20 MG/1
10 TABLET, FILM COATED ORAL
Status: DISCONTINUED | OUTPATIENT
Start: 2022-03-31 | End: 2022-04-01 | Stop reason: HOSPADM

## 2022-03-31 RX ORDER — PANTOPRAZOLE SODIUM 40 MG/1
40 TABLET, DELAYED RELEASE ORAL
Status: DISCONTINUED | OUTPATIENT
Start: 2022-04-01 | End: 2022-04-01 | Stop reason: HOSPADM

## 2022-03-31 RX ORDER — PANTOPRAZOLE SODIUM 40 MG/1
40 TABLET, DELAYED RELEASE ORAL ONCE
Status: COMPLETED | OUTPATIENT
Start: 2022-03-31 | End: 2022-03-31

## 2022-03-31 RX ORDER — SODIUM CHLORIDE, SODIUM GLUCONATE, SODIUM ACETATE, POTASSIUM CHLORIDE, MAGNESIUM CHLORIDE, SODIUM PHOSPHATE, DIBASIC, AND POTASSIUM PHOSPHATE .53; .5; .37; .037; .03; .012; .00082 G/100ML; G/100ML; G/100ML; G/100ML; G/100ML; G/100ML; G/100ML
100 INJECTION, SOLUTION INTRAVENOUS CONTINUOUS
Status: DISCONTINUED | OUTPATIENT
Start: 2022-03-31 | End: 2022-04-01 | Stop reason: HOSPADM

## 2022-03-31 RX ADMIN — LEVOTHYROXINE SODIUM 125 MCG: 125 TABLET ORAL at 05:31

## 2022-03-31 RX ADMIN — INSULIN LISPRO 3 UNITS: 100 INJECTION, SOLUTION INTRAVENOUS; SUBCUTANEOUS at 21:15

## 2022-03-31 RX ADMIN — INSULIN LISPRO 1 UNITS: 100 INJECTION, SOLUTION INTRAVENOUS; SUBCUTANEOUS at 12:22

## 2022-03-31 RX ADMIN — PANTOPRAZOLE SODIUM 40 MG: 40 TABLET, DELAYED RELEASE ORAL at 18:38

## 2022-03-31 RX ADMIN — DULOXETINE HYDROCHLORIDE 90 MG: 60 CAPSULE, DELAYED RELEASE ORAL at 09:36

## 2022-03-31 RX ADMIN — HYDRALAZINE HYDROCHLORIDE 25 MG: 25 TABLET ORAL at 05:31

## 2022-03-31 RX ADMIN — CEFTRIAXONE SODIUM 1000 MG: 10 INJECTION, POWDER, FOR SOLUTION INTRAVENOUS at 17:11

## 2022-03-31 RX ADMIN — HEPARIN SODIUM 5000 UNITS: 5000 INJECTION INTRAVENOUS; SUBCUTANEOUS at 05:31

## 2022-03-31 RX ADMIN — METRONIDAZOLE 500 MG: 500 TABLET ORAL at 03:01

## 2022-03-31 RX ADMIN — ACETAMINOPHEN 325MG 650 MG: 325 TABLET ORAL at 21:14

## 2022-03-31 RX ADMIN — OMEGA-3 FATTY ACIDS CAP 1000 MG 1000 MG: 1000 CAP at 17:11

## 2022-03-31 RX ADMIN — SODIUM CHLORIDE, SODIUM GLUCONATE, SODIUM ACETATE, POTASSIUM CHLORIDE, MAGNESIUM CHLORIDE, SODIUM PHOSPHATE, DIBASIC, AND POTASSIUM PHOSPHATE 100 ML/HR: .53; .5; .37; .037; .03; .012; .00082 INJECTION, SOLUTION INTRAVENOUS at 18:45

## 2022-03-31 RX ADMIN — ATORVASTATIN CALCIUM 40 MG: 40 TABLET, FILM COATED ORAL at 17:11

## 2022-03-31 RX ADMIN — ALUMINA, MAGNESIA, AND SIMETHICONE ORAL SUSPENSION REGULAR STRENGTH 30 ML: 1200; 1200; 120 SUSPENSION ORAL at 21:15

## 2022-03-31 RX ADMIN — FLUTICASONE PROPIONATE 1 SPRAY: 50 SPRAY, METERED NASAL at 08:29

## 2022-03-31 RX ADMIN — AMLODIPINE BESYLATE 5 MG: 5 TABLET ORAL at 08:28

## 2022-03-31 RX ADMIN — SODIUM CHLORIDE, SODIUM GLUCONATE, SODIUM ACETATE, POTASSIUM CHLORIDE, MAGNESIUM CHLORIDE, SODIUM PHOSPHATE, DIBASIC, AND POTASSIUM PHOSPHATE 100 ML/HR: .53; .5; .37; .037; .03; .012; .00082 INJECTION, SOLUTION INTRAVENOUS at 01:59

## 2022-03-31 RX ADMIN — ONDANSETRON 4 MG: 2 INJECTION INTRAMUSCULAR; INTRAVENOUS at 14:51

## 2022-03-31 RX ADMIN — Medication 250 MG: at 17:11

## 2022-03-31 RX ADMIN — INSULIN DETEMIR 30 UNITS: 100 INJECTION, SOLUTION SUBCUTANEOUS at 21:15

## 2022-03-31 RX ADMIN — OMEGA-3 FATTY ACIDS CAP 1000 MG 1000 MG: 1000 CAP at 08:28

## 2022-03-31 RX ADMIN — METRONIDAZOLE 500 MG: 500 TABLET ORAL at 09:36

## 2022-03-31 RX ADMIN — INSULIN LISPRO 1 UNITS: 100 INJECTION, SOLUTION INTRAVENOUS; SUBCUTANEOUS at 17:12

## 2022-03-31 RX ADMIN — METOPROLOL TARTRATE 50 MG: 50 TABLET, FILM COATED ORAL at 21:15

## 2022-03-31 RX ADMIN — Medication 250 MG: at 08:28

## 2022-03-31 RX ADMIN — HEPARIN SODIUM 5000 UNITS: 5000 INJECTION INTRAVENOUS; SUBCUTANEOUS at 21:15

## 2022-03-31 RX ADMIN — METOPROLOL TARTRATE 50 MG: 50 TABLET, FILM COATED ORAL at 08:28

## 2022-03-31 RX ADMIN — ALUMINA, MAGNESIA, AND SIMETHICONE ORAL SUSPENSION REGULAR STRENGTH 30 ML: 1200; 1200; 120 SUSPENSION ORAL at 08:28

## 2022-03-31 RX ADMIN — HYDRALAZINE HYDROCHLORIDE 25 MG: 25 TABLET ORAL at 17:11

## 2022-03-31 RX ADMIN — FAMOTIDINE 10 MG: 20 TABLET, FILM COATED ORAL at 21:15

## 2022-03-31 RX ADMIN — ACETAMINOPHEN 325MG 650 MG: 325 TABLET ORAL at 01:38

## 2022-03-31 RX ADMIN — HEPARIN SODIUM 5000 UNITS: 5000 INJECTION INTRAVENOUS; SUBCUTANEOUS at 14:23

## 2022-03-31 RX ADMIN — ALUMINA, MAGNESIA, AND SIMETHICONE ORAL SUSPENSION REGULAR STRENGTH 30 ML: 1200; 1200; 120 SUSPENSION ORAL at 14:51

## 2022-03-31 RX ADMIN — FAMOTIDINE 10 MG: 20 TABLET ORAL at 08:28

## 2022-03-31 NOTE — ASSESSMENT & PLAN NOTE
Lab Results   Component Value Date    EGFR 24 03/31/2022    EGFR 16 03/30/2022    EGFR 28 11/17/2021    CREATININE 2 02 (H) 03/31/2022    CREATININE 2 80 (H) 03/30/2022    CREATININE 1 76 (H) 11/17/2021   - BP stable on admission    Plan:  - Hold home Aldactone, Mirapex, torsemide in setting of ANDRA  - Continue hydralazine, Norvasc

## 2022-03-31 NOTE — PROGRESS NOTES
[facility]  Progress Note Tara Blackburn 1950, 66 y o  female MRN: [id number]  Unit/Bed#: W -01 Encounter: [id number]  Primary Care Provider: Suzy Waldron MD   Date and time admitted to hospital: 3/30/2022 11:29 AM    * Abdominal pain  Assessment & Plan  - Patient reports abdominal pain, diarrhea and nausea X 4 days  Reports yellowy, loose/watery diarrhea more than 20 times per day, poor oral intake, states she has a bowel movement almost immediately after trying to eat/drink  History gastroparesis, follows GI outpatient and has had Botox injections in the past  Denies recent antibiotic use, infections, illness  - CT a/p without acute findings  - UA with moderate leukocytes, follow urine culture results  - Mild leukocytosis, does not meet SIRS/sepsis criteria    Plan:  - Check C  Diff, stool cultures  - IV ceftriaxone  - Continue IV fluids with Plasma-Lyte given ANDRA  - NPO sips with meds for now, advance as tolerated  - GI consulted given history gastroparesis; recommendations appreciated    Acute kidney injury superimposed on chronic kidney disease [facility])  Assessment & Plan  Lab Results   Component Value Date    EGFR 24 03/31/2022    EGFR 16 03/30/2022    EGFR 28 11/17/2021    CREATININE 2 02 (H) 03/31/2022    CREATININE 2 80 (H) 03/30/2022    CREATININE 1 76 (H) 11/17/2021   - Creatinine elevated 2 8 on admission    Also with mild hyponatremia Baseline creatinine 1 7-1 9  - Likely in setting of dehydration/diarrhea and poor oral intake  - Status post 1 L fluid bolus in ED    Plan:  - Hold home torsemide, Aldactone  - Continue with Plasma-Lyte  - Consider nephrology consult if renal function does not improve  - Monitor BMP daily  - Avoid nephrotoxins/hypotension    Depression, recurrent (HCC)  Assessment & Plan  - Continue Cymbalta    Gastroparesis diabeticorum [facility])  Assessment & Plan  Lab Results   Component Value Date    HGBA1C 6 8 (H) 01/06/2022     Recent Labs 03/30/22  1703 03/30/22 2122 03/31/22  0751 03/31/22  1127   POCGLU 134 181* 116 160*     Blood Sugar Average: Last 72 hrs:  · (P) 147 75     Plan:  - History gastroparesis, difficulty presents as constipation  - GI consulted; recommendations appreciated    Type 2 diabetes mellitus with diabetic nephropathy, with long-term current use of insulin [facility])  Assessment & Plan  Lab Results   Component Value Date    HGBA1C 6 8 (H) 01/06/2022     Recent Labs     03/30/22  1703 03/30/22 2122 03/31/22  0751 03/31/22  1127   POCGLU 134 181* 116 160*     Blood Sugar Average: Last 72 hrs:  · (P) 147 75     - Home regimen: Levemir 50 units qHS, Novolog 14 units with breakfast/lunch, 18 units with dinner    - Patient has been using 12 units Novolog with meals recently due to decreased appetite  Plan:  - Continue home Levemir  - SSI q6hrs while NPO  - Hypoglycemia protocol    Hypertensive chronic kidney disease with stage 1 through stage 4 chronic kidney disease, or unspecified chronic kidney disease  Assessment & Plan  Lab Results   Component Value Date    EGFR 24 03/31/2022    EGFR 16 03/30/2022    EGFR 28 11/17/2021    CREATININE 2 02 (H) 03/31/2022    CREATININE 2 80 (H) 03/30/2022    CREATININE 1 76 (H) 11/17/2021   - BP stable on admission    Plan:  - Hold home Aldactone, Mirapex, torsemide in setting of ANDRA  - Continue hydralazine, Norvasc    Abnormal TSH  Assessment & Plan  Lab Results   Component Value Date    [id number] 0 048 (L) 03/31/2022    TSH 0 89 03/06/2020     - A Normal TSH level with an Abnormal T3 was seen on routine lab work  - Consider low T3 in setting of acute illness    Plan:  - Continue to monitor    VTE Pharmacologic Prophylaxis: VTE Score: 3 Moderate Risk (Score 3-4) - Pharmacological DVT Prophylaxis Ordered: heparin    Patient Centered Rounds: IM Team  Discussions with Specialists or Other Care Team Provider: IM Team  Education and Discussions with Family / Patient: Patient declined call to   Current Length of Stay: 1 day(s)  Current Patient Status: Inpatient   Discharge Plan: Anticipate discharge in 24-48 hrs to home  Code Status: Level 3 - DNAR and DNI    Subjective:   Mrs Fuentes states that she is feeling somewhat better however now just feels uncomfortable  She reports only 4 BMs today  Otherwise she reports feeling fine w/o HA, CP, SPB, ABD-pain, N/V/D/BBI, changes in sensorium  She reports that she is able to urinate, defecate, ambulate  Objective:   Vitals:   Temp (24hrs), Av 9 °F (36 6 °C), Min:97 7 °F (36 5 °C), Max:98 2 °F (36 8 °C)    Temp:  [97 7 °F (36 5 °C)-98 2 °F (36 8 °C)] 97 7 °F (36 5 °C)  HR:  [60-78] 68  Resp:  [18] 18  BP: (111-135)/(53-71) 111/53  SpO2:  [93 %-96 %] 93 %  Body mass index is 30 38 kg/m²  Input and Output Summary (last 24 hours): Intake/Output Summary (Last 24 hours) at 3/31/2022 1556  Last data filed at 3/31/2022 0080  Gross per 24 hour   Intake 2300 ml   Output --   Net 2300 ml     Physical Exam:   Physical Exam  Vitals and nursing note reviewed  Constitutional:       Appearance: She is well-developed  She is obese  HENT:      Head: Normocephalic  Nose: Nose normal       Mouth/Throat:      Mouth: Mucous membranes are moist    Eyes:      Extraocular Movements: Extraocular movements intact  Pupils: Pupils are equal, round, and reactive to light  Cardiovascular:      Rate and Rhythm: Normal rate and regular rhythm  Pulses: Normal pulses  Pulmonary:      Breath sounds: Normal breath sounds  Abdominal:      Palpations: Abdomen is soft  Tenderness: There is abdominal tenderness  Musculoskeletal:         General: Normal range of motion  Cervical back: Normal range of motion  Skin:     General: Skin is warm and dry  Neurological:      General: No focal deficit present  Mental Status: She is alert     Psychiatric:         Mood and Affect: Mood normal        Additional Data:   Labs:  Results from last 7 days   Lab Units 03/31/22  0455   WBC Thousand/uL 8 38   HEMOGLOBIN g/dL 11 9   HEMATOCRIT % 35 9   PLATELETS Thousands/uL 227   NEUTROS PCT % 64   LYMPHS PCT % 25   MONOS PCT % 7   EOS PCT % 3     Results from last 7 days   Lab Units 03/31/22  0455   SODIUM mmol/L 142   POTASSIUM mmol/L 4 5   CHLORIDE mmol/L 107   CO2 mmol/L 25   BUN mg/dL 56*   CREATININE mg/dL 2 02*   ANION GAP mmol/L 10   CALCIUM mg/dL 8 4   ALBUMIN g/dL 3 2*   TOTAL BILIRUBIN mg/dL 0 29   ALK PHOS U/L 82   ALT U/L 60   AST U/L 28   GLUCOSE RANDOM mg/dL 117         Results from last 7 days   Lab Units 03/31/22  1127 03/31/22  0751 03/30/22  2122 03/30/22  1703   POC GLUCOSE mg/dl 160* 116 181* 134         Results from last 7 days   Lab Units 03/31/22  0451 03/30/22  1155   PROCALCITONIN ng/ml 0 07 0 09     Lines/Drains:  Invasive Devices  Report    Peripheral Intravenous Line            Peripheral IV 03/31/22 Left;Ventral (anterior) Forearm <1 day              Imaging: Reviewed radiology reports from this admission including: abdominal/pelvic CT    Recent Cultures (last 7 days):   Results from last 7 days   Lab Units 03/30/22  1448   URINE CULTURE  50,000-59,000 cfu/ml Klebsiella-Enterobacter  group*     Last 24 Hours Medication List:   Current Facility-Administered Medications   Medication Dose Route Frequency Provider Last Rate    acetaminophen  650 mg Oral Q6H PRN Sanya Range, PA-C      albuterol  2 puff Inhalation Q6H PRN Sanya Range, PA-C      aluminum-magnesium hydroxide-simethicone  30 mL Oral Q6H PRN Sanya Range, PA-C      amLODIPine  5 mg Oral Daily Sanya Range, PA-C      atorvastatin  40 mg Oral Daily With [employer], PA-C      cefTRIAXone  1,000 mg Intravenous Q24H Sanya Range, PA-C 1,000 mg (03/30/22 1607)    DULoxetine  90 mg Oral Daily Lori Salinas MD      famotidine  10 mg Oral HS Cliff Wall PA-C      fish oil  1,000 mg Oral BID SP Couch-KRUNAL      fluticasone  1 spray Nasal Daily Leeann Oconnell PA-C      heparin (porcine)  5,000 Units Subcutaneous [facility] Leeann Oconnell PA-C      hydrALAZINE  25 mg Oral Q12H Leeann Oconnell PA-C      insulin detemir  30 Units Subcutaneous HS Lorna Vidal DO      insulin lispro  1-6 Units Subcutaneous 4x Daily (AC & HS) Emily Toure PA-C      levothyroxine  125 mcg Oral Early Morning Lorna Vidal DO      metoprolol tartrate  50 mg Oral Q12H [address] Leeann Oconnell PA-C      ondansetron  4 mg Intravenous Q6H PRN Leeann Oconnell PA-C      [START ON 4/1/2022] pantoprazole  40 mg Oral Early Morning Cliff Wall PA-C      saccharomyces boulardii  250 mg Oral BID Lorna Vidal DO       Today, Patient Was Seen By: Monika Juan DO    **Please Note: This note may have been constructed using a voice recognition system  **

## 2022-03-31 NOTE — ASSESSMENT & PLAN NOTE
- Patient reports abdominal pain, diarrhea and nausea X 4 days  Reports yellowy, loose/watery diarrhea more than 20 times per day, poor oral intake, states she has a bowel movement almost immediately after trying to eat/drink  History gastroparesis, follows GI outpatient and has had Botox injections in the past  Denies recent antibiotic use, infections, illness  - CT a/p without acute findings  - UA with moderate leukocytes, follow urine culture results  - Mild leukocytosis, does not meet SIRS/sepsis criteria    Plan:  - Check C   Diff, stool cultures  - IV ceftriaxone  - Continue IV fluids with Plasma-Lyte given ANDRA  - NPO sips with meds for now, advance as tolerated  - GI consulted given history gastroparesis; recommendations appreciated

## 2022-03-31 NOTE — CONSULTS
Consultation - [facility]) [facility]  Kp Nguyen 66 y o  female MRN: [id number]  Unit/Bed#: W -01 Encounter: [id number]        Consults    Reason for Consult / Principal Problem: Abdominal pain, diarrhea    HPI: Kp Nguyen is a 66y o  year old female with history of diabetic gastroparesis, cholecystectomy, chronic kidney disease who presented to the hospital with complaints of her proximally 4 days of left lower quadrant pain and diarrhea which started fairly acutely, she said she also was having nausea although no vomiting through this ordeal   She was actually scheduled for an EGD with Botox injection into the pylorus for this may, a procedure she had had done in 2021 with some success in controlling her gastroparesis symptoms  With regards to the current event, she denies any recent antibiotics use, any sick contacts or any ingestion of unusual or undercooked foods  She says that a couple weeks ago she did start a "tart cherry" supplement  She says that she is generally constipated at baseline so does not usually experience diarrhea like this  Some stool infectious studies have been ordered and are currently pending, she had a CT scan of the abdomen and pelvis without contrast (due to ANDRA) and this showed no obvious acute pathology  She denies any fevers or chills, she does not appear to have any significant leukocytosis or left shift  She still complains of a lot of bloating and flatulence at this time, she says her abdominal pain is generally similar to when she came in, neither improved nor worsened  REVIEW OF SYSTEMS:    CONSTITUTIONAL: Denies any fever, chills, or rigors  Good appetite, and no recent weight loss  HEENT: No earache or tinnitus  Denies hearing loss or visual disturbances  CARDIOVASCULAR: No chest pain or palpitations  RESPIRATORY: Denies any cough, hemoptysis, shortness of breath or dyspnea on exertion    GASTROINTESTINAL: As noted in the History of Present Illness  GENITOURINARY: No problems with urination  Denies any hematuria or dysuria  NEUROLOGIC: No dizziness or vertigo, denies headaches  MUSCULOSKELETAL: Denies any muscle or joint pain  SKIN: Denies skin rashes or itching  ENDOCRINE: Denies excessive thirst  Denies intolerance to heat or cold  PSYCHOSOCIAL: Denies depression or anxiety  Denies any recent memory loss  Historical Information   Past Medical History:   Diagnosis Date    Allergic     Anesthesia     pt reports "had to use double lumen endo tube for hiatal hernia repair/so surgeon could get to where he needed to work"    Arthritis     Aspiration into airway     Basal cell carcinoma 2007    left cheek     BCC (basal cell carcinoma) 05/27/2021    Left Nasal tip    Cancer ([facility] [address] )     squamous cell cancer on forhead    Cancer ([facility] [address] )     basal cell on nose     Chronic kidney disease 2000, 2018    Stones, kidney disease stage 4    Chronic pain disorder     bilat feet and joint pain on occas    Colon polyp     COVID-19 08/2021    recovered at home/did receive monoclonal infusion    CPAP (continuous positive airway pressure) dependence     not using as has been recalled    Dental bridge present     Depression     Diabetes mellitus ([facility] [address] )     Type 2    Diabetic neuropathy (HCC)     Disease of thyroid gland     Family history of thyroid problem     Fatty liver     GERD (gastroesophageal reflux disease)     Heart burn     History of pneumonia     Hyperlipidemia     Hyperplasia, parathyroid ([facility] [address] )     Hypertension     Kidney problem     Kidney stone     Motion sickness     Obesity (BMI 30 0-34  9)     Pollen allergies     RLS (restless legs syndrome)     SCC (squamous cell carcinoma) 05/04/2021    left mid forehead    Seasonal allergies     Sleep apnea     uses CPAP    Squamous cell skin cancer 2007    left cheek     Swollen ankles     Wears glasses      Past Surgical History:   Procedure Laterality Date    ARTHROSCOPY KNEE      BREAST BIOPSY      stereotactic-benign    BREAST BIOPSY      stereo-benign    BREAST EXCISIONAL BIOPSY      unknown date-benign    BREAST EXCISIONAL BIOPSY      unknown date-benign    BREAST EXCISIONAL BIOPSY      unknown date-benign    BREAST EXCISIONAL BIOPSY      unknown age-benign    CHOLECYSTECTOMY      COLONOSCOPY      EXAMINATION UNDER ANESTHESIA N/A 2021    Procedure: EXAM UNDER ANESTHESIA (EUA), DISE;  Surgeon: Lora Hackett MD;  [facility];  Service: ENT    HERNIA REPAIR      HIATAL HERNIA REPAIR      LIPOSUCTION      MOHS SURGERY  2021    left mid forehead-Gautam    MOHS SURGERY Left 2021    Left nasal tip- gautam     TN INCISION IMPLANT CRANIAL NERVE STIM ELECTRODE/PULSE GEN N/A 11/10/2021    Procedure: INSERTION UPPER AIRWAY STIMULATOR, INSPIRE IMPLANT;  Surgeon: Lora Hackett MD;  Location: [facility];  Service: ENT    REDUCTION MAMMAPLASTY Bilateral 2000    REDUCTION MAMMAPLASTY      SKIN BIOPSY      SKIN CANCER EXCISION  2007    squamous cell carcinoma     SKIN CANCER EXCISION  2007    basal cell carcinoma    SQUAMOUS CELL CARCINOMA EXCISION      TOE SURGERY      TONSILLECTOMY      TUBAL LIGATION      UPPER GASTROINTESTINAL ENDOSCOPY       Social History   Social History     Substance and Sexual Activity   Alcohol Use Yes    Alcohol/week: 0 0 standard drinks    Comment: rarely a sip      Social History     Substance and Sexual Activity   Drug Use No     Social History     Tobacco Use   Smoking Status Former Smoker    Packs/day: 2 00    Years: 10 00    Pack years: 20 00    Types: Cigarettes    Start date: 1967   [facility] Quit date: 12    Years since quittin 2   Smokeless Tobacco Never Used     Family History   Problem Relation Age of Onset    Heart disease Mother     Depression Mother     Hypertension Mother     COPD Mother     Hearing loss Mother     Heart disease Father     Lung cancer Father 79 Smoker     Cancer Father         brain    Alcohol abuse Father     Dementia Father     Hypertension Father     Thyroid disease Father     COPD Father     Arthritis Father     Brain cancer Father 76    Hypertension Sister     Diabetes Sister     Heart disease Sister     Thyroid disease Sister     Cancer Sister         Lympoma    Lung cancer Sister 78    Hypertension Brother     Diabetes Brother     Cancer Brother         Throat    Dementia Brother     Stroke Brother     Hypertension Brother     No Known Problems Son     No Known Problems Son     Heart disease Brother     Diabetes Brother     Brain cancer Paternal Aunt         unknown age       Meds/Allergies     Medications Prior to Admission   Medication    acetaminophen (TYLENOL) 500 mg tablet    albuterol (Ventolin HFA) 90 mcg/act inhaler    amLODIPine (NORVASC) 5 mg tablet    b complex vitamins tablet    B-D ULTRAFINE III SHORT PEN 31G X 8 MM MISC    DULoxetine (Cymbalta) 30 mg delayed release capsule    DULoxetine (CYMBALTA) 60 mg delayed release capsule    famotidine (PEPCID) 20 mg tablet    fluticasone (FLONASE) 50 mcg/act nasal spray    gabapentin (NEURONTIN) 100 mg capsule    hydrALAZINE (APRESOLINE) 25 mg tablet    Icosapent Ethyl (Vascepa) 1 g CAPS    insulin aspart (NovoLOG) 100 units/mL injection    insulin detemir (Levemir FlexTouch) 100 Units/mL injection pen    levothyroxine 137 mcg tablet    metoprolol tartrate (LOPRESSOR) 50 mg tablet    Multiple Vitamin (multivitamin) capsule    olmesartan (BENICAR) 40 mg tablet    pramipexole (MIRAPEX) 0 25 mg tablet    QUEtiapine (SEROquel) 25 mg tablet    rosuvastatin (CRESTOR) 10 MG tablet    rosuvastatin (CRESTOR) 20 MG tablet    spironolactone (ALDACTONE) 25 mg tablet    torsemide (DEMADEX) 20 mg tablet    Vitamin D, Ergocalciferol, 2000 units CAPS     Current Facility-Administered Medications   Medication Dose Route Frequency    acetaminophen (TYLENOL) tablet 650 mg  650 mg Oral Q6H PRN    albuterol (PROVENTIL HFA,VENTOLIN HFA) inhaler 2 puff  2 puff Inhalation Q6H PRN    aluminum-magnesium hydroxide-simethicone (MYLANTA) oral suspension 30 mL  30 mL Oral Q6H PRN    amLODIPine (NORVASC) tablet 5 mg  5 mg Oral Daily    atorvastatin (LIPITOR) tablet 40 mg  40 mg Oral Daily With Dinner    ceftriaxone (ROCEPHIN) 1 g/50 mL in dextrose IVPB  1,000 mg Intravenous Q24H    DULoxetine (CYMBALTA) delayed release capsule 90 mg  90 mg Oral Daily    famotidine (PEPCID) tablet 10 mg  10 mg Oral HS    fish oil capsule 1,000 mg  1,000 mg Oral BID    fluticasone (FLONASE) 50 mcg/act nasal spray 1 spray  1 spray Nasal Daily    heparin (porcine) subcutaneous injection 5,000 Units  5,000 Units Subcutaneous Q8H [address]    hydrALAZINE (APRESOLINE) tablet 25 mg  25 mg Oral Q12H    insulin detemir (LEVEMIR) subcutaneous injection 30 Units  30 Units Subcutaneous HS    insulin lispro (HumaLOG) 100 units/mL subcutaneous injection 1-6 Units  1-6 Units Subcutaneous 4x Daily (AC & HS)    levothyroxine tablet 125 mcg  125 mcg Oral Early Morning    metoprolol tartrate (LOPRESSOR) tablet 50 mg  50 mg Oral Q12H RICHARD    ondansetron (ZOFRAN) injection 4 mg  4 mg Intravenous Q6H PRN    [START ON 4/1/2022] pantoprazole (PROTONIX) EC tablet 40 mg  40 mg Oral Early Morning    saccharomyces boulardii (FLORASTOR) capsule 250 mg  250 mg Oral BID       Allergies   Allergen Reactions    Ciprofloxacin Hives and Itching           Objective     Blood pressure 111/53, pulse 68, temperature 97 7 °F (36 5 °C), resp  rate 18, height 5' 3" (1 6 m), weight 77 8 kg (171 lb 8 3 oz), SpO2 93 %        Intake/Output Summary (Last 24 hours) at 3/31/2022 1532  Last data filed at 3/31/2022 8419  Gross per 24 hour   Intake 2300 ml   Output --   Net 2300 ml         PHYSICAL EXAM     General Appearance:   Alert, cooperative, no distress, appears stated age    HEENT:   Normocephalic, atraumatic, anicteric      Neck:  Supple, symmetrical, trachea midline, no adenopathy;    thyroid: no enlargement/tenderness/nodules; no carotid  bruit or JVD    Lungs:   Clear to auscultation bilaterally; no rales, rhonchi or wheezing; respirations unlabored    Heart[de-identified]   S1 and S2 normal; regular rate and rhythm; no murmur, rub, or gallop     Abdomen:   Soft, non-tender, non-distended; normal bowel sounds; no masses, no organomegaly    Genitalia:   Deferred    Rectal:   Deferred    Extremities:  No cyanosis, clubbing or edema    Pulses:  2+ and symmetric all extremities    Skin:  Skin color, texture, turgor normal, no rashes or lesions    Lymph nodes:  No palpable cervical, axillary or inguinal lymphadenopathy        Lab Results:   Admission on 03/30/2022   Component Date Value    WBC 03/30/2022 10 49*    RBC 03/30/2022 4 52     Hemoglobin 03/30/2022 13 5     Hematocrit 03/30/2022 41 2     MCV 03/30/2022 91     MCH 03/30/2022 29 9     MCHC 03/30/2022 32 8     RDW 03/30/2022 14 0     MPV 03/30/2022 11 7     Platelets 51/09/9136 247     nRBC 03/30/2022 0     Neutrophils Relative 03/30/2022 67     Immat GRANS % 03/30/2022 1     Lymphocytes Relative 03/30/2022 23     Monocytes Relative 03/30/2022 6     Eosinophils Relative 03/30/2022 3     Basophils Relative 03/30/2022 0     Neutrophils Absolute 03/30/2022 7 06     Immature Grans Absolute 03/30/2022 0 05     Lymphocytes Absolute 03/30/2022 2 37     Monocytes Absolute 03/30/2022 0 67     Eosinophils Absolute 03/30/2022 0 31     Basophils Absolute 03/30/2022 0 03     Sodium 03/30/2022 134*    Potassium 03/30/2022 4 3     Chloride 03/30/2022 99*    CO2 03/30/2022 25     ANION GAP 03/30/2022 10     BUN 03/30/2022 76*    Creatinine 03/30/2022 2 80*    Glucose 03/30/2022 171*    Calcium 03/30/2022 9 0     AST 03/30/2022 33     ALT 03/30/2022 72     Alkaline Phosphatase 03/30/2022 93     Total Protein 03/30/2022 7 1     Albumin 03/30/2022 3 7     Total Bilirubin 03/30/2022 0 37     eGFR 03/30/2022 16     Lipase 03/30/2022 61*    Color, UA 03/30/2022 Yellow     Clarity, UA 03/30/2022 Clear     pH, UA 03/30/2022 5 0     Leukocytes, UA 03/30/2022 Moderate*    Nitrite, UA 03/30/2022 Negative     Protein, UA 03/30/2022 Trace*    Glucose, UA 03/30/2022 Negative     Ketones, UA 03/30/2022 Negative     Urobilinogen, UA 03/30/2022 0 2     Bilirubin, UA 03/30/2022 Negative     Blood, UA 03/30/2022 Negative     Specific Gravity, UA 03/30/2022 1 015     RBC, UA 03/30/2022 None Seen     WBC, UA 03/30/2022 20-30*    Epithelial Cells 03/30/2022 Occasional     Bacteria, UA 03/30/2022 Moderate*    Procalcitonin 03/30/2022 0 09     TSH 3RD GENERATON 03/30/2022 0 043*    Urine Culture 03/30/2022 50,000-59,000 cfu/ml Klebsiella-Enterobacter  group*    POC Glucose 03/30/2022 134     POC Glucose 03/30/2022 181*    Magnesium 03/31/2022 3 3*    WBC 03/31/2022 8 38     RBC 03/31/2022 3 97     Hemoglobin 03/31/2022 11 9     Hematocrit 03/31/2022 35 9     MCV 03/31/2022 90     MCH 03/31/2022 30 0     MCHC 03/31/2022 33 1     RDW 03/31/2022 14 0     MPV 03/31/2022 11 5     Platelets 57/44/5377 227     nRBC 03/31/2022 0     Neutrophils Relative 03/31/2022 64     Immat GRANS % 03/31/2022 1     Lymphocytes Relative 03/31/2022 25     Monocytes Relative 03/31/2022 7     Eosinophils Relative 03/31/2022 3     Basophils Relative 03/31/2022 0     Neutrophils Absolute 03/31/2022 5 37     Immature Grans Absolute 03/31/2022 0 04     Lymphocytes Absolute 03/31/2022 2 13     Monocytes Absolute 03/31/2022 0 56     Eosinophils Absolute 03/31/2022 0 26     Basophils Absolute 03/31/2022 0 02     Sodium 03/31/2022 142     Potassium 03/31/2022 4 5     Chloride 03/31/2022 107     CO2 03/31/2022 25     ANION GAP 03/31/2022 10     BUN 03/31/2022 56*    Creatinine 03/31/2022 2 02*    Glucose 03/31/2022 117     Calcium 03/31/2022 8 4     Corrected Calcium 03/31/2022 9 0     AST 03/31/2022 28     ALT 03/31/2022 60     Alkaline Phosphatase 03/31/2022 82     Total Protein 03/31/2022 6 2*    Albumin 03/31/2022 3 2*    Total Bilirubin 03/31/2022 0 29     eGFR 03/31/2022 24     Procalcitonin 03/31/2022 0 07     TSH 3RD GENERATON 03/31/2022 0 048*    Free T4 03/31/2022 1 16     POC Glucose 03/31/2022 116     POC Glucose 03/31/2022 160*       Imaging Studies: I have personally reviewed pertinent reports  CT ABDOMEN AND PELVIS WITHOUT IV CONTRAST     INDICATION:   Left lower quadrant pain, cannot scan with contrast due to GFR 16      COMPARISON:  CT renal stone study 5/22/2021     TECHNIQUE:  CT examination of the abdomen and pelvis was performed without intravenous contrast   Axial, sagittal, and coronal 2D reformatted images were created from the source data and submitted for interpretation       Radiation dose length product (DLP) for this visit:  34 33 96 mGy-cm   This examination, like all CT scans performed in the [facility], was performed utilizing techniques to minimize radiation dose exposure, including the use of iterative   reconstruction and automated exposure control       Enteric contrast was administered       FINDINGS:     ABDOMEN     LOWER CHEST:  Large hiatal hernia      LIVER/BILIARY TREE:  Unremarkable      GALLBLADDER:  Gallbladder is surgically absent      SPLEEN:  Unremarkable      PANCREAS:  Unremarkable      ADRENAL GLANDS:  Unremarkable      KIDNEYS/URETERS:  Nonobstructive 5 mm and 4 mm left upper pole renal calculi  No hydronephrosis  No ureteral calculi  No perinephric collections  Subcentimeter hyperdense lesions off the lower pole of the left kidney are unchanged and likely to represent   hemorrhagic cysts      STOMACH AND BOWEL:  No acute findings  Scattered colonic diverticula without diverticulitis      APPENDIX:  Noninflamed     ABDOMINOPELVIC CAVITY:  No ascites  No pneumoperitoneum    No lymphadenopathy      VESSELS:  Unremarkable for patient's age      PELVIS     REPRODUCTIVE ORGANS:  Unremarkable for patient's age      URINARY BLADDER:  Unremarkable      ABDOMINAL WALL/INGUINAL REGIONS:  Small uncomplicated fat-containing umbilical hernia      OSSEOUS STRUCTURES:  No acute fracture or destructive osseous lesion      IMPRESSION:     No acute inflammatory changes in the abdomen or pelvis             ASSESSMENT/PLAN:     1  Acute onset of lower abdominal pain and diarrhea, relatively benign abdominal exam and CT scan findings; suspect to be due to acute infectious gastroenteritis    -follow-up on stool infectious studies    -if stool studies unremarkable and patient has persistent symptoms, can trial antidiarrheals such as Imodium and/or Questran    -IV fluids, symptomatic management, monitor electrolytes closely    -outpatient follow-up with regards to her gastroparesis, she is planned for a pyloric Botox injection at the beginning of May    -as she does also complain of worsening heartburn symptoms over the course of her other acute symptomatology, will also add Protonix 40 mg once daily, she can continue Pepcid but this can be taken in the evening    - consider repeating imaging if lower abdominal tenderness worsens or patient shows other worrisome signs such as substantial leukocytosis/left shift, fevers, rectal bleeding, etcetera      The patient was seen and examined by Dr Rosanna Park, all finley medical decisions were made with Dr Rosanna Park  Thank you for allowing us to participate in the care of this pleasant patient  We will follow up with you closely

## 2022-03-31 NOTE — ASSESSMENT & PLAN NOTE
Lab Results   Component Value Date    HGBA1C 6 8 (H) 01/06/2022     Recent Labs     03/30/22  1703 03/30/22  2122 03/31/22  0751 03/31/22  1127   POCGLU 134 181* 116 160*     Blood Sugar Average: Last 72 hrs:  · (P) 147 75     Plan:  - History gastroparesis, difficulty presents as constipation  - GI consulted; recommendations appreciated

## 2022-03-31 NOTE — ASSESSMENT & PLAN NOTE
Lab Results   Component Value Date    EGFR 24 03/31/2022    EGFR 16 03/30/2022    EGFR 28 11/17/2021    CREATININE 2 02 (H) 03/31/2022    CREATININE 2 80 (H) 03/30/2022    CREATININE 1 76 (H) 11/17/2021   - Creatinine elevated 2 8 on admission    Also with mild hyponatremia Baseline creatinine 1 7-1 9  - Likely in setting of dehydration/diarrhea and poor oral intake  - Status post 1 L fluid bolus in ED    Plan:  - Hold home torsemide, Aldactone  - Continue with Plasma-Lyte  - Consider nephrology consult if renal function does not improve  - Monitor BMP daily  - Avoid nephrotoxins/hypotension

## 2022-03-31 NOTE — ASSESSMENT & PLAN NOTE
Lab Results   Component Value Date    NTY3CEUSNLWI 0 048 (L) 03/31/2022    TSH 0 89 03/06/2020     - A Normal TSH level with an Abnormal T3 was seen on routine lab work     - Consider low T3 in setting of acute illness    Plan:  - Continue to monitor

## 2022-03-31 NOTE — ASSESSMENT & PLAN NOTE
Lab Results   Component Value Date    HGBA1C 6 8 (H) 01/06/2022     Recent Labs     03/30/22  1703 03/30/22  2122 03/31/22  0751 03/31/22  1127   POCGLU 134 181* 116 160*     Blood Sugar Average: Last 72 hrs:  · (P) 147 75     - Home regimen: Levemir 50 units qHS, Novolog 14 units with breakfast/lunch, 18 units with dinner    - Patient has been using 12 units Novolog with meals recently due to decreased appetite      Plan:  - Continue home Levemir  - SSI q6hrs while NPO  - Hypoglycemia protocol

## 2022-04-01 ENCOUNTER — TRANSITIONAL CARE MANAGEMENT (OUTPATIENT)
Dept: FAMILY MEDICINE CLINIC | Facility: CLINIC | Age: 72
End: 2022-04-01

## 2022-04-01 VITALS
HEIGHT: 63 IN | SYSTOLIC BLOOD PRESSURE: 134 MMHG | TEMPERATURE: 97.8 F | DIASTOLIC BLOOD PRESSURE: 69 MMHG | OXYGEN SATURATION: 93 % | WEIGHT: 171.52 LBS | HEART RATE: 62 BPM | RESPIRATION RATE: 18 BRPM | BODY MASS INDEX: 30.39 KG/M2

## 2022-04-01 LAB
ANION GAP SERPL CALCULATED.3IONS-SCNC: 7 MMOL/L (ref 4–13)
BACTERIA UR CULT: ABNORMAL
BASOPHILS # BLD AUTO: 0.01 THOUSANDS/ΜL (ref 0–0.1)
BASOPHILS NFR BLD AUTO: 0 % (ref 0–1)
BUN SERPL-MCNC: 36 MG/DL (ref 5–25)
CALCIUM SERPL-MCNC: 8.2 MG/DL (ref 8.3–10.1)
CHLORIDE SERPL-SCNC: 108 MMOL/L (ref 100–108)
CO2 SERPL-SCNC: 27 MMOL/L (ref 21–32)
CREAT SERPL-MCNC: 1.65 MG/DL (ref 0.6–1.3)
EOSINOPHIL # BLD AUTO: 0.19 THOUSAND/ΜL (ref 0–0.61)
EOSINOPHIL NFR BLD AUTO: 3 % (ref 0–6)
ERYTHROCYTE [DISTWIDTH] IN BLOOD BY AUTOMATED COUNT: 13.8 % (ref 11.6–15.1)
GFR SERPL CREATININE-BSD FRML MDRD: 30 ML/MIN/1.73SQ M
GLUCOSE SERPL-MCNC: 106 MG/DL (ref 65–140)
GLUCOSE SERPL-MCNC: 118 MG/DL (ref 65–140)
GLUCOSE SERPL-MCNC: 256 MG/DL (ref 65–140)
HCT VFR BLD AUTO: 34 % (ref 34.8–46.1)
HGB BLD-MCNC: 11.8 G/DL (ref 11.5–15.4)
IMM GRANULOCYTES # BLD AUTO: 0.01 THOUSAND/UL (ref 0–0.2)
IMM GRANULOCYTES NFR BLD AUTO: 0 % (ref 0–2)
LYMPHOCYTES # BLD AUTO: 1.83 THOUSANDS/ΜL (ref 0.6–4.47)
LYMPHOCYTES NFR BLD AUTO: 30 % (ref 14–44)
MCH RBC QN AUTO: 29.9 PG (ref 26.8–34.3)
MCHC RBC AUTO-ENTMCNC: 34.7 G/DL (ref 31.4–37.4)
MCV RBC AUTO: 86 FL (ref 82–98)
MONOCYTES # BLD AUTO: 0.46 THOUSAND/ΜL (ref 0.17–1.22)
MONOCYTES NFR BLD AUTO: 8 % (ref 4–12)
NEUTROPHILS # BLD AUTO: 3.53 THOUSANDS/ΜL (ref 1.85–7.62)
NEUTS SEG NFR BLD AUTO: 59 % (ref 43–75)
PLATELET # BLD AUTO: 212 THOUSANDS/UL (ref 149–390)
PMV BLD AUTO: 11.6 FL (ref 8.9–12.7)
POTASSIUM SERPL-SCNC: 4.3 MMOL/L (ref 3.5–5.3)
RBC # BLD AUTO: 3.95 MILLION/UL (ref 3.81–5.12)
SODIUM SERPL-SCNC: 142 MMOL/L (ref 136–145)
WBC # BLD AUTO: 6.03 THOUSAND/UL (ref 4.31–10.16)
WBC SPEC QL GRAM STN: ABNORMAL

## 2022-04-01 PROCEDURE — 99232 SBSQ HOSP IP/OBS MODERATE 35: CPT | Performed by: INTERNAL MEDICINE

## 2022-04-01 PROCEDURE — 99239 HOSP IP/OBS DSCHRG MGMT >30: CPT | Performed by: INTERNAL MEDICINE

## 2022-04-01 PROCEDURE — 85025 COMPLETE CBC W/AUTO DIFF WBC: CPT

## 2022-04-01 PROCEDURE — 82948 REAGENT STRIP/BLOOD GLUCOSE: CPT

## 2022-04-01 PROCEDURE — 80048 BASIC METABOLIC PNL TOTAL CA: CPT | Performed by: INTERNAL MEDICINE

## 2022-04-01 RX ORDER — ROSUVASTATIN CALCIUM 20 MG/1
20 TABLET, COATED ORAL DAILY
Qty: 30 TABLET | Refills: 0 | Status: SHIPPED | OUTPATIENT
Start: 2022-04-01 | End: 2022-08-04

## 2022-04-01 RX ORDER — SULFAMETHOXAZOLE AND TRIMETHOPRIM 400; 80 MG/1; MG/1
1 TABLET ORAL EVERY 12 HOURS SCHEDULED
Qty: 10 TABLET | Refills: 0 | Status: SHIPPED | OUTPATIENT
Start: 2022-04-01 | End: 2022-04-05 | Stop reason: ALTCHOICE

## 2022-04-01 RX ORDER — LEVOTHYROXINE SODIUM 0.12 MG/1
125 TABLET ORAL
Qty: 30 TABLET | Refills: 0 | Status: ON HOLD | OUTPATIENT
Start: 2022-04-02 | End: 2022-04-14

## 2022-04-01 RX ORDER — PANTOPRAZOLE SODIUM 40 MG/1
40 TABLET, DELAYED RELEASE ORAL
Qty: 30 TABLET | Refills: 0 | Status: SHIPPED | OUTPATIENT
Start: 2022-04-02 | End: 2022-04-28

## 2022-04-01 RX ORDER — SACCHAROMYCES BOULARDII 250 MG
250 CAPSULE ORAL 2 TIMES DAILY
Qty: 60 CAPSULE | Refills: 0 | Status: SHIPPED | OUTPATIENT
Start: 2022-04-01 | End: 2022-04-28

## 2022-04-01 RX ORDER — LANOLIN ALCOHOL/MO/W.PET/CERES
3 CREAM (GRAM) TOPICAL
Status: DISCONTINUED | OUTPATIENT
Start: 2022-04-01 | End: 2022-04-01 | Stop reason: HOSPADM

## 2022-04-01 RX ADMIN — HYDRALAZINE HYDROCHLORIDE 25 MG: 25 TABLET ORAL at 05:13

## 2022-04-01 RX ADMIN — INSULIN LISPRO 3 UNITS: 100 INJECTION, SOLUTION INTRAVENOUS; SUBCUTANEOUS at 11:29

## 2022-04-01 RX ADMIN — OMEGA-3 FATTY ACIDS CAP 1000 MG 1000 MG: 1000 CAP at 08:33

## 2022-04-01 RX ADMIN — Medication 250 MG: at 08:33

## 2022-04-01 RX ADMIN — LEVOTHYROXINE SODIUM 125 MCG: 125 TABLET ORAL at 05:13

## 2022-04-01 RX ADMIN — AMLODIPINE BESYLATE 5 MG: 5 TABLET ORAL at 08:33

## 2022-04-01 RX ADMIN — METOPROLOL TARTRATE 50 MG: 50 TABLET, FILM COATED ORAL at 08:33

## 2022-04-01 RX ADMIN — ACETAMINOPHEN 325MG 650 MG: 325 TABLET ORAL at 05:24

## 2022-04-01 RX ADMIN — HEPARIN SODIUM 5000 UNITS: 5000 INJECTION INTRAVENOUS; SUBCUTANEOUS at 05:13

## 2022-04-01 RX ADMIN — PANTOPRAZOLE SODIUM 40 MG: 40 TABLET, DELAYED RELEASE ORAL at 05:13

## 2022-04-01 RX ADMIN — Medication 3 MG: at 01:34

## 2022-04-01 RX ADMIN — FLUTICASONE PROPIONATE 1 SPRAY: 50 SPRAY, METERED NASAL at 10:09

## 2022-04-01 RX ADMIN — DULOXETINE HYDROCHLORIDE 90 MG: 60 CAPSULE, DELAYED RELEASE ORAL at 08:33

## 2022-04-01 NOTE — PLAN OF CARE
Problem: Nutrition/Hydration-ADULT  Goal: Nutrient/Hydration intake appropriate for improving, restoring or maintaining nutritional needs  Description: Monitor and assess patient's nutrition/hydration status for malnutrition  Collaborate with interdisciplinary team and initiate plan and interventions as ordered  Monitor patient's weight and dietary intake as ordered or per policy  Utilize nutrition screening tool and intervene as necessary  Determine patient's food preferences and provide high-protein, high-caloric foods as appropriate       INTERVENTIONS:  - Monitor oral intake, urinary output, labs, and treatment plans  - Assess nutrition and hydration status and recommend course of action  - Evaluate amount of meals eaten  - Assist patient with eating if necessary   - Allow adequate time for meals  - Recommend/ encourage appropriate diets, oral nutritional supplements, and vitamin/mineral supplements  - Order, calculate, and assess calorie counts as needed  - Recommend, monitor, and adjust tube feedings and TPN/PPN based on assessed needs  - Assess need for intravenous fluids  - Provide specific nutrition/hydration education as appropriate  - Include patient/family/caregiver in decisions related to nutrition  Outcome: Progressing     Problem: PAIN - ADULT  Goal: Verbalizes/displays adequate comfort level or baseline comfort level  Description: Interventions:  - Encourage patient to monitor pain and request assistance  - Assess pain using appropriate pain scale  - Administer analgesics based on type and severity of pain and evaluate response  - Implement non-pharmacological measures as appropriate and evaluate response  - Consider cultural and social influences on pain and pain management  - Notify physician/advanced practitioner if interventions unsuccessful or patient reports new pain  Outcome: Progressing     Problem: INFECTION - ADULT  Goal: Absence or prevention of progression during hospitalization  Description: INTERVENTIONS:  - Assess and monitor for signs and symptoms of infection  - Monitor lab/diagnostic results  - Monitor all insertion sites, i e  indwelling lines, tubes, and drains  - Monitor endotracheal if appropriate and nasal secretions for changes in amount and color  - [place] appropriate cooling/warming therapies per order  - Administer medications as ordered  - Instruct and encourage patient and family to use good hand hygiene technique  - Identify and instruct in appropriate isolation precautions for identified infection/condition  Outcome: Progressing  Goal: Absence of fever/infection during neutropenic period  Description: INTERVENTIONS:  - Monitor WBC    Outcome: Progressing     Problem: DISCHARGE PLANNING  Goal: Discharge to home or other facility with appropriate resources  Description: INTERVENTIONS:  - Identify barriers to discharge w/patient and caregiver  - Arrange for needed discharge resources and transportation as appropriate  - Identify discharge learning needs (meds, wound care, etc )  - Arrange for interpretive services to assist at discharge as needed  - Refer to Case Management Department for coordinating discharge planning if the patient needs post-hospital services based on physician/advanced practitioner order or complex needs related to functional status, cognitive ability, or social support system  Outcome: Progressing     Problem: Knowledge Deficit  Goal: Patient/family/caregiver demonstrates understanding of disease process, treatment plan, medications, and discharge instructions  Description: Complete learning assessment and assess knowledge base    Interventions:  - Provide teaching at level of understanding  - Provide teaching via preferred learning methods  Outcome: Progressing

## 2022-04-01 NOTE — ASSESSMENT & PLAN NOTE
- Follow up with gastroenterology  - Follow up with GI  - START taking Pantoprazole 40mg by mouth once per day  - START taking Florastor 250mg by mouth twice per day

## 2022-04-01 NOTE — DISCHARGE INSTR - AVS FIRST PAGE
Dear Chica Morales,     It was our pleasure to care for you here at [facility]  It is our hope that we were always able to exceed the expected standards for your care during your stay  You were hospitalized due to diarrhea  You were cared for on the [facility] 4th floor by Irma Sanchez DO under the service of [address] [place] [address] with the [facility] who covers for your primary care physician (PCP), Lorena Woods MD, while you were hospitalized  If you have any questions or concerns related to this hospitalization, you may contact us at [phone]  For follow up as well as any medication refills, we recommend that you follow up with your primary care physician  A registered nurse will reach out to you by phone within a few days after your discharge to answer any additional questions that you may have after going home  However, at this time we provide for you here, the most important instructions / recommendations at discharge:     Notable Medication Adjustments -   CHANGE Levothyroxine dosing to 125mcg by mouth once per day  START taking Pantoprazole 40mg by mouth once per day  START taking Florastor 250mg by mouth twice per day  START taking Bactrim 400-80mg by mouth every twelve hours for 5 days  Please finish the entire course of antibiotics even if you are starting to feel better,  Continue other medications as listed on the after visit summary  Testing Required after Discharge -   None  Important follow up information -   Please follow up with your Gastroenterologist  Please follow up with your Endocrinologist  Please follow up with your PCP  Other Instructions -   Please contact your PCP for any new symptoms or questions about your healthcare needs  Please do not hesitate to come to the ED or call 911 for immediate medical attention      Please review this entire after visit summary as additional general instructions including medication list, appointments, activity, diet, any pertinent wound care, and other additional recommendations from your care team that may be provided for you        Sincerely,     Daphne Robertson, DO

## 2022-04-01 NOTE — PROGRESS NOTES
Progress Note - Jose Ji 66 y o  female MRN: [id number]    Unit/Bed#: W -01 Encounter: [id number]        Subjective:   Patient warts to be feeling better today, having less abdominal discomfort and has actually not had any diarrhea as of yet this morning  Objective:     Vitals: Blood pressure 134/69, pulse 62, temperature 97 8 °F (36 6 °C), resp  rate 18, height 5' 3" (1 6 m), weight 77 8 kg (171 lb 8 3 oz), SpO2 93 %  ,Body mass index is 30 38 kg/m²  Intake/Output Summary (Last 24 hours) at 4/1/2022 1500  Last data filed at 4/1/2022 1226  Gross per 24 hour   Intake 2579 ml   Output --   Net 2579 ml       Physical Exam:   General appearance: alert, appears stated age and cooperative  Lungs: clear to auscultation bilaterally, no labored breathing/accessory muscle use  Heart: regular rate and rhythm, S1, S2 normal, no murmur, click, rub or gallop  Abdomen: soft, non-tender; bowel sounds normal; no masses,  no organomegaly  Extremities: no edema    Invasive Devices  Report    None                 Lab, Imaging and other studies: I have personally reviewed pertinent reports      Admission on 03/30/2022, Discharged on 04/01/2022   Component Date Value    WBC 03/30/2022 10 49*    RBC 03/30/2022 4 52     Hemoglobin 03/30/2022 13 5     Hematocrit 03/30/2022 41 2     MCV 03/30/2022 91     MCH 03/30/2022 29 9     MCHC 03/30/2022 32 8     RDW 03/30/2022 14 0     MPV 03/30/2022 11 7     Platelets 88/13/7842 247     nRBC 03/30/2022 0     Neutrophils Relative 03/30/2022 67     Immat GRANS % 03/30/2022 1     Lymphocytes Relative 03/30/2022 23     Monocytes Relative 03/30/2022 6     Eosinophils Relative 03/30/2022 3     Basophils Relative 03/30/2022 0     Neutrophils Absolute 03/30/2022 7 06     Immature Grans Absolute 03/30/2022 0 05     Lymphocytes Absolute 03/30/2022 2 37     Monocytes Absolute 03/30/2022 0 67     Eosinophils Absolute 03/30/2022 0 31     Basophils Absolute 03/30/2022 0 03  Sodium 03/30/2022 134*    Potassium 03/30/2022 4 3     Chloride 03/30/2022 99*    CO2 03/30/2022 25     ANION GAP 03/30/2022 10     BUN 03/30/2022 76*    Creatinine 03/30/2022 2 80*    Glucose 03/30/2022 171*    Calcium 03/30/2022 9 0     AST 03/30/2022 33     ALT 03/30/2022 72     Alkaline Phosphatase 03/30/2022 93     Total Protein 03/30/2022 7 1     Albumin 03/30/2022 3 7     Total Bilirubin 03/30/2022 0 37     eGFR 03/30/2022 16     Lipase 03/30/2022 61*    C difficile toxin by PCR 03/31/2022 Negative     Salmonella sp PCR 03/31/2022 None Detected     Shigella sp/Enteroinvasi* 03/31/2022 None Detected     Campylobacter sp (jejuni* 03/31/2022 None Detected     Shiga toxin 1/Shiga toxi* 03/31/2022 None Detected     Color, UA 03/30/2022 Yellow     Clarity, UA 03/30/2022 Clear     pH, UA 03/30/2022 5 0     Leukocytes, UA 03/30/2022 Moderate*    Nitrite, UA 03/30/2022 Negative     Protein, UA 03/30/2022 Trace*    Glucose, UA 03/30/2022 Negative     Ketones, UA 03/30/2022 Negative     Urobilinogen, UA 03/30/2022 0 2     Bilirubin, UA 03/30/2022 Negative     Blood, UA 03/30/2022 Negative     Specific Gravity, UA 03/30/2022 1 015     RBC, UA 03/30/2022 None Seen     WBC, UA 03/30/2022 20-30*    Epithelial Cells 03/30/2022 Occasional     Bacteria, UA 03/30/2022 Moderate*    Procalcitonin 03/30/2022 0 09     TSH 3RD GENERATON 03/30/2022 0 043*    Urine Culture 03/30/2022 50,000-59,000 cfu/ml Enterobacter cloacae complex*    POC Glucose 03/30/2022 134     POC Glucose 03/30/2022 181*    Magnesium 03/31/2022 3 3*    WBC 03/31/2022 8 38     RBC 03/31/2022 3 97     Hemoglobin 03/31/2022 11 9     Hematocrit 03/31/2022 35 9     MCV 03/31/2022 90     MCH 03/31/2022 30 0     MCHC 03/31/2022 33 1     RDW 03/31/2022 14 0     MPV 03/31/2022 11 5     Platelets 23/62/9904 227     nRBC 03/31/2022 0     Neutrophils Relative 03/31/2022 64     Immat GRANS % 03/31/2022 1     Lymphocytes Relative 03/31/2022 25     Monocytes Relative 03/31/2022 7     Eosinophils Relative 03/31/2022 3     Basophils Relative 03/31/2022 0     Neutrophils Absolute 03/31/2022 5 37     Immature Grans Absolute 03/31/2022 0 04     Lymphocytes Absolute 03/31/2022 2 13     Monocytes Absolute 03/31/2022 0 56     Eosinophils Absolute 03/31/2022 0 26     Basophils Absolute 03/31/2022 0 02     Sodium 03/31/2022 142     Potassium 03/31/2022 4 5     Chloride 03/31/2022 107     CO2 03/31/2022 25     ANION GAP 03/31/2022 10     BUN 03/31/2022 56*    Creatinine 03/31/2022 2 02*    Glucose 03/31/2022 117     Calcium 03/31/2022 8 4     Corrected Calcium 03/31/2022 9 0     AST 03/31/2022 28     ALT 03/31/2022 60     Alkaline Phosphatase 03/31/2022 82     Total Protein 03/31/2022 6 2*    Albumin 03/31/2022 3 2*    Total Bilirubin 03/31/2022 0 29     eGFR 03/31/2022 24     Procalcitonin 03/31/2022 0 07     TSH 3RD GENERATON 03/31/2022 0 048*    Free T4 03/31/2022 1 16     POC Glucose 03/31/2022 116     POC Glucose 03/31/2022 160*    POC Glucose 03/31/2022 173*    POC Glucose 03/31/2022 253*    Sodium 04/01/2022 142     Potassium 04/01/2022 4 3     Chloride 04/01/2022 108     CO2 04/01/2022 27     ANION GAP 04/01/2022 7     BUN 04/01/2022 36*    Creatinine 04/01/2022 1 65*    Glucose 04/01/2022 106     Calcium 04/01/2022 8 2*    eGFR 04/01/2022 30     WBC 04/01/2022 6 03     RBC 04/01/2022 3 95     Hemoglobin 04/01/2022 11 8     Hematocrit 04/01/2022 34 0*    MCV 04/01/2022 86     MCH 04/01/2022 29 9     MCHC 04/01/2022 34 7     RDW 04/01/2022 13 8     MPV 04/01/2022 11 6     Platelets 10/13/7681 212     Neutrophils Relative 04/01/2022 59     Immat GRANS % 04/01/2022 0     Lymphocytes Relative 04/01/2022 30     Monocytes Relative 04/01/2022 8     Eosinophils Relative 04/01/2022 3     Basophils Relative 04/01/2022 0     Neutrophils Absolute 04/01/2022 3 53     Immature Grans Absolute 04/01/2022 0 01     Lymphocytes Absolute 04/01/2022 1 83     Monocytes Absolute 04/01/2022 0 46     Eosinophils Absolute 04/01/2022 0 19     Basophils Absolute 04/01/2022 0 01     POC Glucose 04/01/2022 118     POC Glucose 04/01/2022 256*     Results for Melissa Perez (MRN [id number]) as of 4/1/2022 15:00   Ref   Range 3/31/2022 16:44 3/31/2022 20:47 4/1/2022 05:14 4/1/2022 07:20 4/1/2022 11:11   POC Glucose Latest Ref Range: 65 - 140 mg/dl 173 (H) 253 (H)  118 256 (H)   Sodium Latest Ref Range: 136 - 145 mmol/L   142     Potassium Latest Ref Range: 3 5 - 5 3 mmol/L   4 3     Chloride Latest Ref Range: 100 - 108 mmol/L   108     CO2 Latest Ref Range: 21 - 32 mmol/L   27     Anion Gap Latest Ref Range: 4 - 13 mmol/L   7     BUN Latest Ref Range: 5 - 25 mg/dL   36 (H)     Creatinine Latest Ref Range: 0 60 - 1 30 mg/dL   1 65 (H)     Glucose, Random Latest Ref Range: 65 - 140 mg/dL   106     Calcium Latest Ref Range: 8 3 - 10 1 mg/dL   8 2 (L)     eGFR Latest Units: ml/min/1 73sq m   30     WBC Latest Ref Range: 4 31 - 10 16 Thousand/uL   6 03     Red Blood Cell Count Latest Ref Range: 3 81 - 5 12 Million/uL   3 95     Hemoglobin Latest Ref Range: 11 5 - 15 4 g/dL   11 8     HCT Latest Ref Range: 34 8 - 46 1 %   34 0 (L)     MCV Latest Ref Range: 82 - 98 fL   86     MCH Latest Ref Range: 26 8 - 34 3 pg   29 9     MCHC Latest Ref Range: 31 4 - 37 4 g/dL   34 7     RDW Latest Ref Range: 11 6 - 15 1 %   13 8     Platelet Count Latest Ref Range: 149 - 390 Thousands/uL   212     MPV Latest Ref Range: 8 9 - 12 7 fL   11 6     Neutrophils % Latest Ref Range: 43 - 75 %   59     Immat GRANS % Latest Ref Range: 0 - 2 %   0     Lymphocytes Relative Latest Ref Range: 14 - 44 %   30     Monocytes Relative Latest Ref Range: 4 - 12 %   8     Eosinophils Latest Ref Range: 0 - 6 %   3     Basophils Relative Latest Ref Range: 0 - 1 %   0     Immature Grans Absolute Latest Ref Range: 0 00 - 0 20 Thousand/uL   0 01 Absolute Neutrophils Latest Ref Range: 1 85 - 7 62 Thousands/µL   3 53     Lymphocytes Absolute Latest Ref Range: 0 60 - 4 47 Thousands/µL   1 83     Absolute Monocytes Latest Ref Range: 0 17 - 1 22 Thousand/µL   0 46     Absolute Eosinophils Latest Ref Range: 0 00 - 0 61 Thousand/µL   0 19     Basophils Absolute Latest Ref Range: 0 00 - 0 10 Thousands/µL   0 01           Assessment/Plan:    1  Acute nausea and diarrhea with lower abdominal pain, appears to be most likely acute infectious gastroenteritis, viral versus bacterial although stool studies have shown no specific bacterial etiology  Appears to be clinically improving in any case    She had colonoscopy with polyps removed in October 2021    -continue advancement of diet as tolerated    -follow-up on her remaining stool studies    - continue PPI for her GERD    -symptomatic management, outpatient follow-up for EGD with pyloric Botox injection for gastroparesis as previously planned

## 2022-04-01 NOTE — DISCHARGE SUMMARY
[facility]  Discharge- Chica Morales 1950, 66 y o  female MRN: [id number]  Unit/Bed#:  W -01 Encounter: [id number]  Primary Care Provider: Lorena Woods MD   Date and time admitted to hospital: 3/30/2022 11:29 AM    * Abdominal pain  Assessment & Plan  - Follow up with gastroenterology  - Follow up with GI  - START taking Pantoprazole 40mg by mouth once per day  - START taking Florastor 250mg by mouth twice per day    Acute kidney injury superimposed on chronic kidney disease ([facility] [address] )  Assessment & Plan  - Follow up with PCP    Depression, recurrent ([facility] [address] )  Assessment & Plan  - Follow up with PCP    Gastroparesis diabeticorum ([facility] [address] )  Assessment & Plan  - Follow up with Gastroenterology  - Follow up with PCP    Type 2 diabetes mellitus with diabetic nephropathy, with long-term current use of insulin [facility]  Assessment & Plan  - Follow up with Endocrinology (Patient aware)  - Follow up with PCP    Hypertensive chronic kidney disease with stage 1 through stage 4 chronic kidney disease, or unspecified chronic kidney disease  Assessment & Plan  - Follow up with PCP    Abnormal thyroid blood test  Assessment & Plan  - Follow up with Endocrinology (Patient aware)  - Follow up with PCP  - CHANGE Levothyroxine dosing to 125mcg by mouth once per day    Medical Problems             Resolved Problems  Date Reviewed: 4/1/2022    None              Discharging Resident: Irma Sanchez DO  Discharging Attending: No att  providers found  PCP: Lorena Woods MD  Admission Date:   Admission Orders (From admission, onward)     Ordered        03/30/22 1433  INPATIENT ADMISSION  Once                      Discharge Date: 04/01/22    Consultations During Hospital Stay:  · GI    Procedures Performed:   · CT AP WO    Significant Findings / Test Results:   · WBC 1 049  · UA: moderate bacteria, 20-30 WBC  · T3 0 043  · UCx: 50-59K cfu/ml Enterobacter cloacae complex    Incidental Findings:   · None    Test Results Pending at Discharge (will require follow up): · None     Outpatient Tests Requested:  · None    Complications:  None    Reason for Admission: Diarrhea    Hospital Course:   Issues managed during hospitalization:  1) Abdominal Pain: GI consulted; imaging study and lab work completed, emperic Abx provided, NPO to allow for gastric decompression  Pharmacological therapy was provided to relieve diarrhea  2) Depression: Continued home medication and provided supportive management  3) Gastroparesis diabeticorum: Continued home medication and provided supportive management  4) T2DM: Continued home medication and provided supportive management  5) Hypertensive CKD: IVF and supportive management  6) Abnormal T3: Lowered dose of Levothyroxine from 137mcg to 125mcg PO QD    Please see above list of diagnoses and related plan for additional information  Condition at Discharge: good    Discharge Day Visit / Exam:   Subjective:  Mrs Fuentes was awake and alert this AM during visitation, She reports that that she is feeling much better and that her diarrhea has stopped  As of the point of evaluation she has not had a single episode of diarrhea today  She reports that she does still have some abdominal tenderness however no pain  She reports no HA, CP, SOB, N/V/D/C/BBI, or changes in sensorium  She reports being able to urinate, defecate, ambulate and feed piecemeal     Vitals: Blood Pressure: 134/69 (04/01/22 0835)  Pulse: 62 (04/01/22 0835)  Temperature: 97 8 °F (36 6 °C) (04/01/22 0721)  Temp Source: Oral (03/31/22 1508)  Respirations: 18 (04/01/22 0721)  Height: 5' 3" (160 cm) (03/30/22 1613)  Weight - Scale: 77 8 kg (171 lb 8 3 oz) (03/30/22 1613)  SpO2: 93 % (04/01/22 0835)    Exam:   Physical Exam  Vitals and nursing note reviewed  Constitutional:       Appearance: She is well-developed  She is obese  HENT:      Head: Normocephalic        Nose: Nose normal       Mouth/Throat:      Mouth: Mucous membranes are moist    Eyes:      Extraocular Movements: Extraocular movements intact  Pupils: Pupils are equal, round, and reactive to light  Cardiovascular:      Rate and Rhythm: Normal rate and regular rhythm  Pulses: Normal pulses  Pulmonary:      Breath sounds: Normal breath sounds  Abdominal:      Palpations: Abdomen is soft  Tenderness: There is abdominal tenderness  Musculoskeletal:         General: Normal range of motion  Cervical back: Normal range of motion  Skin:     General: Skin is warm and dry  Neurological:      General: No focal deficit present  Mental Status: She is alert  Psychiatric:         Mood and Affect: Mood normal         Discussion with Family: Patient declined call to   Discharge instructions/Information to patient and family:   See after visit summary for information provided to patient and family  Provisions for Follow-Up Care:  See after visit summary for information related to follow-up care and any pertinent home health orders  Disposition:   Home    Planned Readmission: None    Discharge Medications:  See after visit summary for reconciled discharge medications provided to patient and/or family        **Please Note: This note may have been constructed using a voice recognition system**

## 2022-04-01 NOTE — DISCHARGE INSTRUCTIONS
Abdominal Pain   WHAT YOU NEED TO KNOW:   What do I need to know about abdominal pain? Abdominal pain may be felt between the bottom of your rib cage and your groin  Acute pain usually lasts less than 3 months  Chronic pain lasts longer than 3 months  Your pain may be sharp or dull  The pain may stay in the same place or move around  You may have the pain all the time, or it may come and go  Depending on the cause, you may also have nausea, vomiting, fever, or diarrhea  What causes abdominal pain? The cause may not be found  The following are common causes of abdominal pain:  · Overeating, gas pains, or food poisoning    · Constipation or diarrhea    · An injury    · A serious health problem, such as appendicitis, a hernia, or an ulcer    · Infection or a blockage    · A liver, gallbladder, or kidney condition    How is the cause of abdominal pain diagnosed? Your healthcare provider will check your abdomen  He or she will ask where your pain is and when it started  Tell him or her if the pain wakes you or stops you from doing your daily activities  Describe anything that makes the pain better or worse  You may also need any of the following:  · Blood, urine, or bowel movement  samples may be tested for signs of an infection, disease, or injury  · X-ray pictures  of your abdomen may show an injury or other cause of the pain  How is abdominal pain treated? · Prescription pain medicine  may be given  Ask your healthcare provider how to take this medicine safely  Some prescription pain medicines contain acetaminophen  Do not take other medicines that contain acetaminophen without talking to your healthcare provider  Too much acetaminophen may cause liver damage  Prescription pain medicine may cause constipation  Ask your healthcare provider how to prevent or treat constipation  · Medicines  may be given to calm your stomach or prevent vomiting      · Relaxation therapy  may be used along with pain medicine  · Surgery  may be needed, depending on the cause  What can I do to manage my symptoms? · Apply heat  on your abdomen for 20 to 30 minutes every 2 hours for as many days as directed  Heat helps decrease pain and muscle spasms  · Make changes to the food you eat, if needed  Do not eat foods that cause abdominal pain or other symptoms  Eat small meals more often  The following changes may also help:    ? Eat more high-fiber foods if you are constipated  High-fiber foods include fruits, vegetables, whole-grain foods, and legumes  ? Do not eat foods that cause gas if you have bloating  Examples include broccoli, cabbage, and cauliflower  Do not drink soda or carbonated drinks  These may also cause gas  ? Do not eat foods or drinks that contain sorbitol or fructose if you have diarrhea and bloating  Some examples are fruit juices, candy, jelly, and sugar-free gum  ? Do not eat high-fat foods  Examples include fried foods, cheeseburgers, hot dogs, and desserts  ? Limit or do not have caffeine  Caffeine may make symptoms such as heartburn or nausea worse  ? Drink more liquids to prevent dehydration from diarrhea or vomiting  Ask your healthcare provider how much liquid to drink each day and which liquids are best for you  · Keep a diary of your abdominal pain  A diary may help your healthcare provider learn what is causing your abdominal pain  Include when the pain happens, how long it lasts, and what the pain feels like  Write down any other symptoms you have with abdominal pain  Also write down what you eat, and what symptoms you have after you eat  · Manage your stress  Stress may cause abdominal pain  Your healthcare provider may recommend relaxation techniques and deep breathing exercises to help decrease your stress  Your healthcare provider may recommend you talk to someone about your stress or anxiety, such as a counselor or a trusted friend   Get plenty of sleep and exercise regularly  · Limit or do not drink alcohol  Alcohol can make your abdominal pain worse  Ask your healthcare provider if it is safe for you to drink alcohol  Also ask how much is safe for you to drink  · Do not smoke  Nicotine and other chemicals in cigarettes can damage your esophagus and stomach  Ask your healthcare provider for information if you currently smoke and need help to quit  E-cigarettes or smokeless tobacco still contain nicotine  Talk to your healthcare provider before you use these products  Call your local emergency number (911 in the [address] [facility]) if:   · You have new chest pain or shortness of breath  When should I seek immediate care? · You have pulsing pain in your upper abdomen or lower back that suddenly becomes constant  · Your pain is in the right lower abdominal area and worsens with movement  · You have a fever over 100 4°F (38°C) or shaking chills  · You are vomiting and cannot keep food or liquids down  · Your pain does not improve or gets worse over the next 8 to 12 hours  · You see blood in your vomit or bowel movements, or they look black and tarry  · Your skin or the whites of your eyes turn yellow  · You are a woman and have a large amount of vaginal bleeding that is not your monthly period  When should I call my doctor? · You have pain in your lower back  · You are a man and have pain in your testicles  · You have pain when you urinate  · You have questions or concerns about your condition or care  CARE AGREEMENT:   You have the right to help plan your care  Learn about your health condition and how it may be treated  Discuss treatment options with your healthcare providers to decide what care you want to receive  You always have the right to refuse treatment  The above information is an  only  It is not intended as medical advice for individual conditions or treatments   Talk to your doctor, nurse or pharmacist before following any medical regimen to see if it is safe and effective for you  © Copyright "[employer] 2022 Information is for End User's use only and may not be sold, redistributed or otherwise used for commercial purposes  All illustrations and images included in CareNotes® are the copyrighted property of [employer] Inc  or Irma Martell    Acute Abdominal Pain   WHAT YOU NEED TO KNOW:   What do I need to know about acute abdominal pain? Acute abdominal pain usually starts suddenly and gets worse quickly  What causes acute abdominal pain? · An allergic reaction to food, food poisoning, or acid reflux    · Stress    · Constipation or a blockage in your bowels    · Monthly period pain in females    · Inflammation or rupture of your appendix    · Swelling or an infection in your abdomen or an organ    · An ulcer or a tear in your esophagus, stomach, or intestines    · Bleeding in your abdomen or an organ    · Stones in your kidney or gallbladder    · In women, diseases of the fallopian tubes or ovaries, or an ectopic pregnancy    How is the cause of acute abdominal pain diagnosed? Your healthcare provider will examine you and ask about your symptoms  Tell the provider when your symptoms started and about any recent travel or surgery  Also tell him or her what makes the pain better or worse  Based on what your provider finds from the exam, and your symptoms, you may need any of the following:  · Blood tests  may be done to check inflammation, liver function, blood cell levels, or get information about your overall health  · A sample of your bowel movement  may be tested to see if you are absorbing nutrients from your diet  It can also be tested for germs that may be causing your illness  · X-ray, ultrasound, CT, or MRI  pictures may be used to check the organs inside your abdomen  You may be given contrast liquid to help the organs show up better in the pictures   Tell the healthcare provider if you have ever had an allergic reaction to contrast liquid  Do not enter the MRI room with anything metal  Metal can cause serious injury  Tell the healthcare provider if you have any metal in or on your body  · An endoscopy  is a test to look inside your esophagus, stomach, and small intestine  During an endoscopy, healthcare providers may find problems in your esophagus, stomach, or small intestine  Some problems may be fixed with small tools  Samples may be taken from your esophagus, stomach, or small intestine, and sent to a lab for tests  · A colonoscopy  is a test that is done to look inside your colon  During a colonoscopy, healthcare providers may find problems in your colon  Some problems may be fixed with small tools  Samples may be taken from your colon and sent to the lab for tests  How is acute abdominal pain treated? Treatment depends on the cause of your abdominal pain  You may need any of the following:  · Medicines  may be given to decrease pain, treat an infection, and manage your symptoms, such as constipation  · Surgery  may be needed to treat a serious cause of abdominal pain  Examples include surgery to treat appendicitis or a blockage in your bowels  What can I do to manage my symptoms? · Apply heat  on your abdomen for 20 to 30 minutes every 2 hours for as many days as directed  Heat helps decrease pain and muscle spasms  · Make changes to the food you eat, if needed  Do not eat foods that cause abdominal pain or other symptoms  Eat small meals more often  The following changes may also help:    ? Eat more high-fiber foods if you are constipated  High-fiber foods include fruits, vegetables, whole-grain foods, and legumes  ? Do not eat foods that cause gas if you have bloating  Examples include broccoli, cabbage, and cauliflower  Do not drink soda or carbonated drinks  These may also cause gas      ? Do not eat foods or drinks that contain sorbitol or fructose if you have diarrhea and bloating  Some examples are fruit juices, candy, jelly, and sugar-free gum  ? Do not eat high-fat foods  Examples include fried foods, cheeseburgers, hot dogs, and desserts  ? Limit or do not have caffeine  Caffeine may make symptoms, such as heart burn or nausea, worse  ? Drink more liquids to prevent dehydration from diarrhea or vomiting  Ask your healthcare provider how much liquid to drink each day and which liquids are best for you  · Manage your stress  Stress may cause abdominal pain  Your healthcare provider may recommend relaxation techniques and deep breathing exercises to help decrease your stress  Your provider may recommend you talk to someone about your stress or anxiety, such as a counselor or a trusted friend  Get plenty of sleep and exercise regularly  · Limit or do not drink alcohol  Alcohol can make your abdominal pain worse  Ask your healthcare provider if it is okay for you to drink alcohol  Also ask how much is safe for you to drink  A drink of alcohol is 12 ounces of beer, ½ ounce of liquor, or 5 ounces of wine  · Do not smoke  Nicotine and other chemicals in cigarettes can damage your esophagus and stomach  Ask your healthcare provider for information if you currently smoke and need help to quit  E-cigarettes or smokeless tobacco still contain nicotine  Talk to your healthcare provider before you use these products  When should I seek immediate care? · You vomit blood or cannot stop vomiting  · You have blood in your bowel movement, or it looks like tar  · You have bleeding from your rectum  · Your abdomen is larger than usual, more painful, and hard  · You have severe pain in your abdomen  · You stop passing gas and having bowel movements  · You feel weak, dizzy, or faint  When should I call my doctor? · You have a fever  · You have new or worsening signs or symptoms      · Your symptoms do not get better with treatment  · You have questions or concerns about your condition or care  CARE AGREEMENT:   You have the right to help plan your care  Learn about your health condition and how it may be treated  Discuss treatment options with your healthcare providers to decide what care you want to receive  You always have the right to refuse treatment  The above information is an  only  It is not intended as medical advice for individual conditions or treatments  Talk to your doctor, nurse or pharmacist before following any medical regimen to see if it is safe and effective for you  © Copyright [employer] 2022 Information is for End User's use only and may not be sold, redistributed or otherwise used for commercial purposes  All illustrations and images included in CareNotes® are the copyrighted property of [employer]  or [facility] Wilman Khoury     Acute Kidney Injury   WHAT YOU NEED TO KNOW:   What is acute kidney injury? Acute kidney injury (ANDRA) is also called acute kidney failure, or acute renal failure  ANDRA happens when your kidneys suddenly stop working correctly  Normally, the kidneys remove fluid, chemicals, and waste from your blood  These wastes are turned into urine by your kidneys  ANDRA usually happens over hours or days  When you have ANDRA, your kidneys do not remove the waste, chemicals, or extra fluid from your body  A normal amount of urine is not produced  ANDRA is usually temporary, but it may become a chronic kidney condition  What causes ANDRA? · Decreased blood flow to the kidney, such as from hypercalcemia (high blood calcium level) or severe heart disease     · A disease or condition that affects the kidneys, such as hypertension (high blood pressure) or diabetes     · A blockage in the kidney or ureter, such as a kidney or bladder stone, enlarged prostate, or tumor    What increases my risk for ANDRA?    · Being hospitalized with a serious illness, such as sepsis or severe burns    · Peripheral artery disease    · Older age in adults    · Kidney or liver diseases    · Medical conditions such as dehydration, hypertension, diabetes, or heart failure    · Certain medicines such as NSAIDs    What are the signs and symptoms of ANDRA? You may not have any symptoms with early or mild ANDRA  As ANDRA progresses, you may have any of the following:  · Decrease in the amount of urine or no urination    · Swelling in your arms, legs, or feet     · Weakness, drowsiness, or no appetite    · Nausea, flank pain, muscle twitching or muscle cramps    · Itchy skin, or your breath or body smells like urine    · Behavior changes, confusion, disorientation, or seizures    How is ANDRA diagnosed? There are many causes of ANDRA  To find the cause and to treat your ANDRA correctly, your healthcare provider may do any of the following:  · Blood and urine tests  show how well your kidneys are working  They may also show the cause of your ANDRA  · An x-ray or ultrasound  may show problems with your kidneys  Your healthcare provider may see a blockage in your kidneys  He or she may see narrowing of the artery that sends blood to your kidneys  You may be given contrast liquid to help your kidneys show up better in the pictures  Tell the healthcare provider if you have ever had an allergic reaction to contrast liquid  How is ANDRA treated? Treatment depends upon the cause of your acute kidney injury and how severe it is  Usually, ANDRA will be monitored in the hospital  If you have mild ANDRA, you may be able to go home to recover  Your healthcare providers will treat the cause of your ANDRA  You may need IV fluids if your ANDRA was caused by little or no fluid in your body  You may need dialysis to remove waste and extra fluid from your body  Your healthcare provider may tell you to eat food low in sodium (salt), potassium, phosphorus, or protein   You may need to see a dietitian before you are discharged to get help with planning your meals  How can I prevent ANDRA? · Manage other health conditions  such as diabetes, high blood pressure, or heart disease  These conditions increase your risk for acute kidney injury  Take your medicines for these conditions as directed  Also, monitor your blood sugar and blood pressure levels as directed  Contact your healthcare provider if your levels are not in the range he or she says it should be  · Talk to your healthcare provider before you take over-the-counter-medicine  NSAIDs, stomach medicine, or laxatives may harm your kidneys and increase your risk for acute kidney injury  If it is okay to take the medicine, follow the directions on the package  Do not take more than directed  · Tell healthcare providers if you have had ANDRA  before you get contrast liquid for an x-ray or CT scan  Your healthcare provider may give you medicine to prevent kidney problems caused by the liquid  CARE AGREEMENT:   You have the right to help plan your care  Learn about your health condition and how it may be treated  Discuss treatment options with your healthcare providers to decide what care you want to receive  You always have the right to refuse treatment  The above information is an  only  It is not intended as medical advice for individual conditions or treatments  Talk to your doctor, nurse or pharmacist before following any medical regimen to see if it is safe and effective for you  © Copyright [employer] 2022 Information is for End User's use only and may not be sold, redistributed or otherwise used for commercial purposes  All illustrations and images included in CareNotes® are the copyrighted property of [employer] Inc  or [employer] [facility]    Chronic Abdominal Pain   WHAT YOU NEED TO KNOW:   What is chronic abdominal pain? Chronic abdominal pain lasts longer than 3 months  What causes chronic abdominal pain?   The cause of chronic abdominal pain may not be found  The following are possible causes:  · Anxiety or stress    · Lactose intolerance or celiac disease    · Liver disease, cancer, or chronic pancreatitis    · Irritable bowel syndrome, ulcerative colitis, or Chron disease    · An ulcer in your esophagus or stomach, or an infection    · A hernia or tissue growth that causes organs and tissues to stick together    What are the signs and symptoms of chronic abdominal pain? Signs and symptoms of chronic abdominal pain may come and go  You may feel pain in all areas of your abdomen, or just in one place  You may not want to eat  You may not want to do your daily activities  You may also have any of the following:  · Cramping    · Acid reflux    · Bloating and gas    · Constipation or diarrhea    · Nausea and vomiting    How is the cause of chronic abdominal pain diagnosed? Your healthcare provider will examine you  He or she may ask about your family history of abdominal pain  Tell your provider about your symptoms and the medicines you currently take  Tell him or her if anything makes your symptoms better or worse  Based on what your provider finds after the exam, and your symptoms, you may need any of the following:  · Blood tests  may be done to check for inflammation, liver function, blood cell levels, or get information about your overall health  · A sample of your bowel movement  may be tested to see if you are absorbing nutrients from your diet  It can also be tested for germs that may be causing your illness  · X-ray, ultrasound, CT, or MRI  pictures may be used to check the organs inside your abdomen  You may be given contrast liquid to help the organs show up better in the pictures  Tell the healthcare provider if you have ever had an allergic reaction to contrast liquid  Do not enter the MRI room with anything metal  Metal can cause serious injury  Tell the healthcare provider if you have any metal in or on your body      · An endoscopy  is a test to look inside your esophagus, stomach, and small intestine  During an endoscopy, healthcare providers may find problems in your esophagus, stomach, or small intestine  Some problems may be fixed with small tools  Samples may be taken from your esophagus, stomach, or small intestine, and sent to a lab for tests  · A colonoscopy  is a test that is done to look inside your colon  During a colonoscopy, healthcare providers may find problems in your colon  Some problems may be fixed with small tools  Samples may be taken from your colon and sent to the lab for tests  How is chronic abdominal pain treated? Healthcare providers may not find a medical problem that is causing your abdominal pain  You may need any of the following if a cause is found:  · NSAIDs , such as ibuprofen, help decrease swelling, pain, and fever  NSAIDs can cause stomach bleeding or kidney problems in certain people  If you take blood thinner medicine, always ask your healthcare provider if NSAIDs are safe for you  Always read the medicine label and follow directions  · Acetaminophen  decreases pain and fever  It is available without a doctor's order  Ask how much to take and how often to take it  Follow directions  Read the labels of all other medicines you are using to see if they also contain acetaminophen, or ask your doctor or pharmacist  Acetaminophen can cause liver damage if not taken correctly  Do not use more than 4 grams (4,000 milligrams) total of acetaminophen in one day  · Prescription pain medicine  may be given  Ask your healthcare provider how to take this medicine safely  Some prescription pain medicines contain acetaminophen  Do not take other medicines that contain acetaminophen without talking to your healthcare provider  Too much acetaminophen may cause liver damage  Prescription pain medicine may cause constipation  Ask your healthcare provider how to prevent or treat constipation       · Medicines may be given to manage symptoms such as vomiting or constipation  Medicines may also be used to help manage a cause of your pain, such as anxiety  · Therapy  can help you learn to cope with stress and anxiety  This may help decrease your abdominal pain  · Surgery  is rarely needed, but may be done if there is a problem with an organ in your abdomen  Examples include an organ that is stuck to tissue or a hernia  How can I manage my symptoms? · Apply heat  on your abdomen for 20 to 30 minutes every 2 hours for as many days as directed  Heat helps decrease pain and muscle spasms  · Make changes to the food you eat, if needed  Do not eat foods that cause abdominal pain or other symptoms  Eat small meals more often  The following changes may also help:    ? Eat more high-fiber foods if you are constipated  High-fiber foods include fruits, vegetables, whole-grain foods, and legumes  ? Do not eat foods that cause gas if you have bloating  Examples include broccoli, cabbage, and cauliflower  Do not drink soda or carbonated drinks, because these may also cause gas  ? Do not eat foods or drinks that contain sorbitol or fructose if you have diarrhea and bloating  Some examples are fruit juices, candy, jelly, and sugar-free gum  ? Do not eat high-fat foods  Examples include fried foods, cheeseburgers, hot dogs, and desserts  ? Limit or do not have caffeine  Caffeine may make symptoms, such as heart burn or nausea, worse  ? Drink more liquids to prevent dehydration from diarrhea or vomiting  Ask your healthcare provider how much liquid to drink each day and which liquids are best for you  · Keep a diary of your abdominal pain  A diary may help your healthcare provider learn what is causing your abdominal pain  Include when the pain happens, how long it lasts, and what the pain feels like  Write down any other symptoms you have with abdominal pain   Also write down what you eat, and what symptoms you have after you eat  · Manage your stress  Stress may cause abdominal pain  Your healthcare provider may recommend relaxation techniques and deep breathing exercises to help decrease your stress  Your healthcare provider may recommend you talk to someone about your stress or anxiety, such as a counselor or a trusted friend  Get plenty of sleep and exercise regularly  · Limit or do not drink alcohol  Alcohol can make your abdominal pain worse  Ask your healthcare provider if it is okay for you to drink alcohol  Also ask how much is safe for you to drink  A drink of alcohol is 12 ounces of beer, ½ ounces of liquor, or 5 ounces of wine  · Do not smoke  Nicotine and other chemicals in cigarettes can damage your esophagus and stomach  Ask your healthcare provider for information if you currently smoke and need help to quit  E-cigarettes or smokeless tobacco still contain nicotine  Talk to your healthcare provider before you use these products  When should I seek immediate care? · Your abdominal pain gets worse, and spreads to your back  · You vomit blood or what looks like coffee grounds  · You have blood or mucus in your bowel movement  · You cannot stop vomiting  · You have diarrhea for more than 1 week  · You feel weak, dizzy, or faint  · Your abdomen is larger than usual, more painful, and hard  When should I call my doctor? · You have a fever or chills  · You have new or worsening symptoms  · You lose weight without trying  · Your pain prevents you from doing your daily activities  · You have questions or concerns about your condition or care  CARE AGREEMENT:   You have the right to help plan your care  Learn about your health condition and how it may be treated  Discuss treatment options with your healthcare providers to decide what care you want to receive  You always have the right to refuse treatment   The above information is an  only  It is not intended as medical advice for individual conditions or treatments  Talk to your doctor, nurse or pharmacist before following any medical regimen to see if it is safe and effective for you  © Copyright [employer] 2022 Information is for End User's use only and may not be sold, redistributed or otherwise used for commercial purposes  All illustrations and images included in CareNotes® are the copyrighted property of A D A M , Inc  or [facility] Wilman Khoury     Chronic Kidney Disease Diet   WHAT YOU NEED TO KNOW:   What is a chronic kidney disease (CKD) diet? A CKD diet limits protein, phosphorus, sodium, and potassium  Liquids may also need to be limited in later stages of CKD  This diet can help slow down the rate of damage to your kidneys  Your diet may change over time as your health condition changes  You may also need to make other diet changes if you have other health problems, such as diabetes  What kind of diet changes are needed? There are 5 stages of CKD  The diet changes you need to make are based on your stage of kidney disease  Work with your dietitian or healthcare provider to plan meals that are right for you  You may need any of the following:  · Limit protein  in all stages of kidney disease  Limit the portion sizes of protein you eat to limit the amount of work your kidneys have to do  Foods that are high in protein are meat, poultry (chicken and turkey), fish, eggs, and dairy (milk, cheese, yogurt)  Your healthcare provider will tell you how much protein to eat each day  · Limit sodium  as directed  You may need to limit sodium to less than 2,300 milligrams (mg) each day  Ask your dietitian or healthcare provider how much sodium you can have each day  The amount depends on your stage of kidney disease  Table salt, canned foods, soups, salted snacks, and processed meats, like deli meats and sausage, are high in sodium           · Limit the amount of phosphorus you eat  Your kidneys cannot get rid of extra phosphorus that builds up in your blood  This may cause your bones to lose calcium and weaken  Foods that are high in phosphorus are dairy products, beans, peas, nuts, and whole grains  Phosphorus is also found in cocoa, beer, and cola drinks  Your healthcare provider will tell you how much phosphorus you can have each day  · Limit potassium  if your potassium blood levels are too high  Your dietitian or healthcare provider will tell you if you need to limit potassium  Potassium is found in fruits and vegetables  · Limit liquids  as directed  Your healthcare provider may recommend that you limit liquids in stages 4 and 5 of kidney disease  If your body retains fluids, you will have swelling and fluid may build up in your lungs  This can cause other health problems, such as shortness of breath  What foods can I include? Your dietitian will tell you how many servings you can have from each of the food groups below  The approximate amount of these nutrients is listed next to each food group  Read the food label to find the exact amount  · Bread, cereal, and grains: These foods contain about 80 calories, 2 grams (g) of protein, 150 mg of sodium, 50 mg of potassium, and 30 mg of phosphorus  ? 1 slice (1 ounce) of bread ([place], [place], raisin, light rye, or sourdough white), small dinner roll, or 6-inch tortilla    ? ½ of a hamburger bun, hot dog bun, or English muffin or ¼ of a bagel    ? 1 cup of unsweetened cereal or ½ cup of cooked cereal, such as cream of wheat    ? ? cup of cooked pasta (noodles, macaroni, or spaghetti) or rice    ? 4 (2-inch) unsalted crackers or 3 squares of hillary crackers    ? 3 cups of air-popped, unsalted popcorn    ? ¾ ounce of unsalted pretzels    · Vegetables:  A serving of these foods contains about 30 calories, 2 g of protein, 50 mg of sodium, and 50 mg of phosphorus      ? Low potassium (less than 150 mg):      § ½ cup cooked green beans, cabbage, cauliflower, beets, or corn    § 1 cup raw cucumber, endive, alfalfa sprouts, cabbage, cauliflower, or watercress    § 1 cup of all types of lettuce    § ¼ cup cooked or ½ cup raw mushrooms or onions    § 1 cup cooked eggplant    ? Medium potassium (150 to 250 mg):      § 1 cup raw broccoli, celery, or zucchini    § ½ cup cooked broccoli, celery, green peas, summer squash, zucchini, or peppers    § 1 cup cooked kale or turnips    · Fruits:  A serving of these foods contains about 60 calories, 0 g protein, 0 mg sodium, and 150 mg of phosphorus  Each serving is ½ cup, unless another amount is given  ? Low potassium (less than 150 mg): § Apple juice, applesauce, or 1 small apple    § Blueberries    § Cranberries or cranberry juice cocktail    § Fresh or canned pears (light syrup or packed in water)    § Grapes or grape juice    § Canned peaches (light syrup or packed in water)    § Pineapple or strawberries    § 1 tangerine    § Watermelon    ? Medium potassium (150 to 250 mg):      § Fresh peaches or pears    § Cherries    § Cantaloupe, elizabeth, or papaya    § Grapefruit or grapefruit juice    · Meat, poultry, and fish: These foods have about 75 calories, 7 g of protein, an average of 65 mg of sodium, 115 mg of potassium, and 70 mg of phosphorus  Do not use salt to prepare these foods  ? 1 ounce of cooked beef, pork, or poultry    ? 1 ounce of any fresh or frozen fish, lobster, shrimp, crab, clams, tuna, unsalted canned salmon, or unsalted sardines    · Other protein foods: These foods have about 90 calories, 7 g of protein, an average of 100 mg of sodium, 100 mg of potassium, and 120 mg of phosphorus  ? 1 large whole egg or ¼ cup of low-cholesterol egg substitute    ? 1 ounce of cheese    ? ¼ cup of cottage cheese or tofu    ? 1 ounce of unsalted nuts or 2 tablespoons of peanut butter    · Fats:   These foods have very little protein and about 45 calories, 55 mg of sodium, 10 mg of potassium, and 5 mg of phosphorus  Include healthy fats, such as unsaturated fats, which are listed below  ? 1 teaspoon margarine or mayonnaise    ? 1 teaspoon oil (safflower, sunflower, corn, soybean, olive, peanut, canola)    ? 1 tablespoon oil-based salad dressing (such as [place]) or 2 tablespoons mayonnaise-based salad dressing (such as ranch)    What foods should I limit or avoid? · Starches: The following foods have higher amounts of sodium, potassium, or phosphorus  ? Biscuits, muffins, pancakes, and waffles    ? Cake and cornbread from boxed mixes    ? Oatmeal and whole-wheat cereals    ? Salted pretzel sticks or rings and sandwich cookies    · Meat and protein foods: The following are high in sodium and phosphorus  ? Deli-style meat, such as roast beef, ham, and turkey    ? Canned salmon and sardines    ? Processed cheese, such as American cheese and cheese spreads    ? Smoked or cured meat, such as corned beef, vega, ham, hot dogs, and sausage    · Legumes: These foods have about 90 calories, 6 g of protein, less than 10 mg of sodium, 250 mg of potassium, and 100 mg of phosphorus  ? ? cup of black beans, red kidney beans, black-eye peas, garbanzos, and lentils    ? ¼ cup of green or mature soybeans    · Dairy: The following foods have about 8 g of protein, an average of 120 mg of sodium, 350 mg of potassium, and 220 mg of phosphorus  ? 1 cup of milk (fat-free, low-fat, whole, buttermilk, or chocolate milk)    ? 1 cup of low-fat plain or sugar-free yogurt or ice cream    ? ½ cup of pudding or custard    ? Nondairy milk substitutes: These foods have 75 calories, 1 gram of protein, and an average of 40 mg of sodium, 60 mg of potassium, and 60 mg of phosphorus  A serving is ½ cup of almond, rice, or soy milk, or nondairy creamer  · Vegetables: The following vegetables are high in potassium  Each serving has more than 250 mg of potassium   A serving is ½ cup, unless another amount is given     ? Artichoke or ¼ of a medium avocado    ? [place] sprouts, beets, chard, jasper or mustard greens    ? Potatoes, sweet potatoes, pumpkin, and yams    ? ¾ cup of okra    ? Raw tomatoes and low-sodium tomato juice, or tomato sauce    ? Winter squash, cooked asparagus, and cooked spinach    · Fruit:  The following fruits are high in potassium  Each serving has more than 250 mg of potassium  ? 3 fresh apricots    ? 1 small nectarine (2 inches across)    ? 1 small orange and ½ cup of orange juice    ? ¼ cup of dates    ? ? of a small honeydew melon    ? 1 six-inch banana    ? ½ cup of prune juice or prunes and kiwifruit    · Fats:  Limit unhealthy fats, such as saturated fats, which are listed below  ? Butter, lard, cream cheese, whipped cream, and sour cream    ? Powdered coffee creamer    · Other: The following foods are high in sodium  ? Frozen dinners, soups, and fast foods, such as hamburgers and pizza (see the food label for serving sizes)    ? Table salt and seasoned salts, such as onion or garlic salt    ? Barbecue sauce, ketchup, mustard, soy sauce, steak sauce, and teriyaki sauce    When should I call my nephrologist or dietitian? · You are gaining or losing weight very quickly  · You have shortness of breath  · You have nausea and are vomiting  · You feel very weak and tired  · You are having trouble following the CKD diet  CARE AGREEMENT:   You have the right to help plan your care  Discuss treatment options with your healthcare provider to decide what care you want to receive  You always have the right to refuse treatment  The above information is an  only  It is not intended as medical advice for individual conditions or treatments  Talk to your doctor, nurse or pharmacist before following any medical regimen to see if it is safe and effective for you    © Copyright [employer] 2022 Information is for End User's use only and may not be sold, redistributed or otherwise used for commercial purposes  All illustrations and images included in CareNotes® are the copyrighted property of A D A M , Inc  or Irma Martell    Depression   WHAT YOU NEED TO KNOW:   What is depression? Depression is a medical condition that causes feelings of sadness or hopelessness that do not go away  Depression may cause you to lose interest in things you used to enjoy  These feelings may interfere with your daily life  What causes or increases my risk for depression? Depression may be caused by changes in brain chemicals that affect your mood  Your risk for depression may be higher if you have any of the following:  · Stressful events such as the death of a loved one, unemployment, or divorce    · A family history of depression    · A chronic medical condition, such as diabetes, heart disease, or cancer    · Drug or alcohol abuse    What are the signs and symptoms of depression? · Appetite changes, or weight gain or loss    · Trouble going to sleep or staying asleep, or sleeping too much    · Fatigue or lack of energy    · Feeling restless, irritable, or withdrawn    · Feeling worthless, hopeless, discouraged, or guilty    · Trouble concentrating, remembering things, doing daily tasks, or making decisions    · Thoughts about hurting or killing yourself    How is depression diagnosed? Your healthcare provider will ask about your symptoms and how long you have had them  He or she will ask if you have any family members with depression  Tell your healthcare provider about any stressful events in your life  He or she may ask about any other health conditions or medicines you take  How is depression treated? · Therapy  is often used together with medicine  Therapy is a way for you to talk about your feelings and anything that may be causing depression  Therapy can be done alone or in a group   It may also be done with family members or a significant other     · Antidepressant medicine  may be given to relieve depression  You may need to take this medicine for several weeks before you begin to feel better  Tell your healthcare provider about any side effects or problems you have with your medicine  The type or amount of medicine may need to be changed  How can I manage depression? · Get regular physical activity  Try to be active for 30 minutes, 3 to 5 days a week  Physical activity can help relieve depression  Work with your healthcare provider to develop a plan that you enjoy  It may help to ask someone to be active with you  · Create a regular sleep schedule  A routine can help you relax before bed  Listen to music, read, or do yoga  Try to go to bed and wake up at the same time every day  Sleep is important for emotional health  · Eat a variety of healthy foods  Healthy foods include fruits, vegetables, whole-grain breads, low-fat dairy products, lean meats, fish, and cooked beans  A healthy meal plan is low in fat, salt, and added sugar  · Do not drink alcohol or use drugs  Alcohol and drugs can make depression worse  Talk to your therapist or doctor if you need help quitting  The following resources are available at any time to help you, if needed:   · [address] [facility]: 1-135-467-122-273-7127 (1-840-788-GFTT)     · Suicide Hotline: 9-125.189.5044 (0-646-XIBJSDB)     · For a list of international numbers: https://save org/find-help/international-resources/    Call your local emergency number (911 in the [address] [place] [address],[address]) if:   · You think about harming yourself or someone else  · You have done something on purpose to hurt yourself  When should I call my therapist or doctor? · Your symptoms do not improve  · You cannot make it to your next appointment  · You have new symptoms  · You have questions or concerns about your condition or care  CARE AGREEMENT:   You have the right to help plan your care   Learn about your health condition and how it may be treated  Discuss treatment options with your healthcare providers to decide what care you want to receive  You always have the right to refuse treatment  The above information is an  only  It is not intended as medical advice for individual conditions or treatments  Talk to your doctor, nurse or pharmacist before following any medical regimen to see if it is safe and effective for you  © Copyright [employer] 2022 Information is for End User's use only and may not be sold, redistributed or otherwise used for commercial purposes  All illustrations and images included in CareNotes® are the copyrighted property of A D A M , Inc  or [facility] Wilman Khoury     Diabetic Gastroparesis   WHAT YOU NEED TO KNOW:   What is diabetic gastroparesis? Diabetic gastroparesis is a type of nerve damage that slows digestion  High blood sugar levels from diabetes can damage nerves and tissues in your stomach  The damage prevents your stomach from emptying normally  Gastroparesis is also called delayed gastric emptying  What increases my risk for diabetic gastroparesis? · History of type 1 or type 2 diabetes for at least 5 years    · Eye, nerve, or kidney problems due to diabetes    What are the signs and symptoms of diabetic gastroparesis? Your symptoms may be worse if you drink alcohol or smoke  You may have any of the following:  · Constipation that may be replaced, at times, by diarrhea    · High or low blood sugar levels that you cannot control    · Nausea, vomiting, or loss of appetite    · Bloated or early full feeling while you eat    · Sudden cramps, swelling, or pain in your abdomen    · Weight loss without trying    · Heartburn    How is diabetic gastroparesis diagnosed? Your healthcare provider will feel your abdomen and ask about your diabetes  Tell him or her about all medicines you currently take  Certain medicines can affect how your digestive system works  Examples include opioids, tricyclic antidepressants, and certain COPD or asthma medicines  Some medicines used to treat type 2 diabetes may also affect your digestive system  Make sure your provider knows about all your current medicines  You may also need any of the following tests:  · A gastric emptying breath test (GEBT)  will show how fast food moves from your stomach to your small intestine  The test measures the amount of carbon in your breath after you eat a meal prepared by healthcare providers  · An x-ray or ultrasound  may show how your stomach is working  You may be given a chalky liquid to drink before the pictures are taken  This liquid helps your stomach and intestines show up clearly on the monitor  · An endoscopy  may show what is causing your digestive problems  A scope is used to show images of the inside of your stomach  A scope is a thin, bendable tube with a light and camera on the end  Samples may be taken from your digestive tract and sent to a lab for tests  · A scintigraphy , or gastric-emptying scan, measures how quickly food moves out of your stomach  A slightly radioactive substance is placed in food  The amount of radiation is small and safe  You eat the food and then lie under a machine that takes pictures of the food inside your stomach  Pictures will be taken every 15 minutes, up to 4 hours after you eat, or as directed  How is diabetic gastroparesis treated? Your healthcare provider may change one or more of your current medicines  Do not  change your medicines without direction from your provider  Any of the following may also be used to treat gastroparesis:  · Medicines:      ? Motility medicines  help your stomach muscles move food and liquids out of your stomach faster  These medicines also may help you digest food better  ? Nausea medicine  helps calm your stomach and prevent vomiting  ?  An antibiotic  may be given for a short time to help your stomach empty more quickly  · A feeding tube  may be needed if your stomach cannot process food  You may need the feeding tube for a short time, until your stomach starts working properly  You may instead need a long-term feeding tube if your gastroparesis is severe  Ask for more information about feeding tubes  · Stomach stimulation  may be helpful if your symptoms are severe and other treatments do not work  Surgery is needed to implant a device in your abdomen  The device sends mild signals to the nerves and muscles in your stomach to relieve nausea and vomiting  How can I manage my symptoms? · Walk after you eat  This may help speed digestion  · Follow the meal plan  that your healthcare or dietitian gave you  This meal plan can help decrease your symptoms  The following may also help you manage your symptoms:    ? Eat less fat and fiber  High-fat and high-fiber foods may be hard for your stomach to digest  You may need to avoid fruits and vegetables such as oranges and broccoli  ? Eat 4 to 6 small meals a day  Smaller, more frequent meals are easier for your stomach to handle  ? Drink more liquids with your meals  Your healthcare provider may recommend liquid meals, such as soup  Liquid is easier to digest than solid food  ? Ask if you should prepare your food in a   Blended foods are easier to digest  Ask for directions on which foods to use and how to blend the food correctly  ? Ask about vitamins  you may need and how to add them to your meals  · Do not smoke  Nicotine can damage blood vessels, slow your digestion, and make it more difficult to manage diabetes  Do not use e-cigarettes or smokeless tobacco in place of cigarettes or to help you quit  They still contain nicotine  Ask your healthcare provider for information if you currently smoke and need help quitting  · Do not drink alcohol  Alcohol may slow your digestion more  · Follow your diabetes treatment plan  You may need to check your blood sugar more often  High blood sugar levels slow digestion and can make your symptoms worse  When should I seek immediate care? · You are vomiting more severely or for a longer period than usual     · You urinate less than usual, and your mouth is dry  · You feel dizzy and weak, or you have fainted  · You have severe pain in your stomach or abdomen  When should I call my doctor or diabetes care team?   · Your blood sugar level is higher or lower than healthcare providers have told you it should be  · You continue to have pain and bloating in your abdomen  · You continue to have nausea and vomiting, or you are not able to eat  · You have questions or concerns about your condition or care  CARE AGREEMENT:   You have the right to help plan your care  Learn about your health condition and how it may be treated  Discuss treatment options with your healthcare providers to decide what care you want to receive  You always have the right to refuse treatment  The above information is an  only  It is not intended as medical advice for individual conditions or treatments  Talk to your doctor, nurse or pharmacist before following any medical regimen to see if it is safe and effective for you  © Copyright [employer] 2022 Information is for End User's use only and may not be sold, redistributed or otherwise used for commercial purposes  All illustrations and images included in CareNotes® are the copyrighted property of A D A M , Inc  or [facility] Wilman Khoury     Diabetic Kidney Disease   WHAT YOU NEED TO KNOW:   What is diabetic kidney disease? Diabetic kidney disease (DKD) is the gradual and permanent loss of kidney function  This occurs because of kidney damage caused by high blood sugar levels  Normally, the kidneys remove fluid, chemicals, and waste from your blood  These wastes are turned into urine by your kidneys   When you have DKD, your kidneys do not function properly  What are the signs and symptoms of DKD? Your signs and symptoms will depend on how well your kidneys work  You may not have any symptoms, or you may have any of the following:  · Changes in how often you need to urinate    · Ankle and leg swelling    · Fatigue or weakness    · Itching    · Muscle cramps    · Nausea, vomiting, or loss of appetite    How is DKD diagnosed? Blood and urine tests will show how well your kidneys are working  A biopsy may be done to make sure there are no other causes of your kidney disease  A biopsy is a procedure to remove and test a small piece of tissue from your kidney  How is DKD treated? The goals of treatment are to control your symptoms and prevent your DKD from getting worse  You may need any of the following:  · Medicines  may be given to decrease blood pressure and get rid of extra fluid  Blood pressure medicines can help to slow down the loss of kidney function  · Dialysis  is a treatment that may be needed to remove chemicals and waste from your blood when your kidneys can no longer do this  · Surgery  may be needed to create an arteriovenous fistula (AVF) in your arm or insert a catheter into your abdomen or chest  This is done so you can receive dialysis  · A kidney transplant  may be done if your DKD becomes severe  What can I do to manage DKD? · Control your blood sugar levels  If you use insulin or take diabetes medicine, take these as directed  Follow the meal and activity plan recommended by your diabetes care team providers  Check your blood sugar levels every day, as often as your care team provider has recommended  He or she may want you to have your A1c checked every 3 to 6 months  Most people should keep their A1c at or below 7%  · Follow your meal plan as directed  You may need to eat only a certain amount of protein at each meal  You may also need to track your sodium (salt) and potassium intake   Work with your dietitian to develop a meal plan that is right for you  · Check your blood pressure as directed  High blood pressure can damage the blood vessels in your kidneys  This prevents your kidneys from working correctly and increases your risk for DKD  A normal blood pressure is 119/79 or lower  Talk to your healthcare provider about your blood pressure goals  Together you can create a plan to lower your blood pressure if needed and keep it in a healthy range  The plan may include lifestyle changes or medicines to lower your blood pressure  · Talk to your care team provider about over-the-counter (OTC) medicines you should avoid  Some OTC medicines, such as ibuprofen, can damage your kidneys  Call 911 for any of the following:   · You have a seizure  · You have sudden chest pain or shortness of breath  When should I seek immediate care? · Your heart is beating faster than normal for you  · You are confused and very drowsy  When should I my care team provider? · You suddenly gain or lose more weight than your care team provider has told you is okay  · You have itchy skin or a rash  · You have nausea and repeated vomiting  · You have fatigue or muscle weakness  · You have an increased need to urinate, burning or pain when you urinate, blood in your urine, or strong odor to your urine  · You have questions or concerns about your condition or care  CARE AGREEMENT:   You have the right to help plan your care  Learn about your health condition and how it may be treated  Discuss treatment options with your healthcare providers to decide what care you want to receive  You always have the right to refuse treatment  The above information is an  only  It is not intended as medical advice for individual conditions or treatments  Talk to your doctor, nurse or pharmacist before following any medical regimen to see if it is safe and effective for you    © Copyright [employer] 2022 Information is for End User's use only and may not be sold, redistributed or otherwise used for commercial purposes  All illustrations and images included in CareNotes® are the copyrighted property of A D A M , Inc  or Irma Martell    GERD (Gastroesophageal Reflux Disease)   AMBULATORY CARE:   Gastroesophageal reflux disease (GERD)  is reflux that happens more than 2 times a week for a few weeks  Reflux means acid and food in your stomach back up into your esophagus  GERD can cause other health problems over time if it is not treated  Common causes of GERD:  GERD often happens because the lower muscle (sphincter) of the esophagus does not close properly  The sphincter normally opens to let food into the stomach  It then closes to keep food and stomach acid in the stomach  If the sphincter does not close properly, stomach acid and food back up (reflux) into the esophagus  The following may increase your risk for GERD:  · Certain foods such as spicy foods, chocolate, foods that contain caffeine, peppermint, and fried foods    · Hiatal hernia    · Certain medicines such as calcium channel blockers (used to treat high blood pressure), allergy medicines, sedatives, or antidepressants    · Pregnancy, obesity, or scleroderma    · Lying down after a meal    · Drinking alcohol or smoking cigarettes    Signs and symptoms:   · Heartburn (burning pain in your chest)    · Pain after meals that spreads to your neck, jaw, or shoulder    · Pain that gets better when you change positions    · Bitter or acid taste in your mouth    · A dry cough    · Trouble swallowing or pain with swallowing    · Hoarseness or a sore throat    · Burping or hiccups    · Feeling full soon after you start eating    Call your local emergency number (911 in the [address] [place] [address],[address]) if:   · You have severe chest pain and sudden trouble breathing        Seek care immediately if:   · You have trouble breathing after you vomit     · You have trouble swallowing, or pain with swallowing  · Your bowel movements are black, bloody, or tarry-looking  · Your vomit looks like coffee grounds or has blood in it  Call your doctor or gastroenterologist if:   · You feel full and cannot burp or vomit  · You vomit large amounts, or you vomit often  · You are losing weight without trying  · Your symptoms get worse or do not improve with treatment  · You have questions or concerns about your condition or care  Treatment for GERD:   · Medicines  are used to decrease stomach acid  Medicine may also be used to help your lower esophageal sphincter and stomach contract (tighten) more  · Surgery  is done to wrap the upper part of the stomach around the esophageal sphincter  This will strengthen the sphincter and prevent reflux  Manage GERD:       · Do not have foods or drinks that may increase heartburn  These include chocolate, peppermint, fried or fatty foods, drinks that contain caffeine, or carbonated drinks (soda)  Other foods include spicy foods, onions, tomatoes, and tomato-based foods  Do not have foods or drinks that can irritate your esophagus, such as citrus fruits, juices, and alcohol  · Do not eat large meals  When you eat a lot of food at one time, your stomach needs more acid to digest it  Eat 6 small meals each day instead of 3 large meals, and eat slowly  Do not eat meals 2 to 3 hours before bedtime  · Elevate the head of your bed  Place 6-inch blocks under the head of your bed frame  You may also use more than one pillow under your head and shoulders while you sleep  · Maintain a healthy weight  If you are overweight, weight loss may help relieve symptoms of GERD  · Do not smoke  Smoking weakens the lower esophageal sphincter and increases the risk of GERD  Ask your healthcare provider for information if you currently smoke and need help to quit   E-cigarettes or smokeless tobacco still contain nicotine  Talk to your healthcare provider before you use these products  · Do not put pressure on your abdomen  Pressure pushes acid up into your esophagus  Do not wear clothing that is tight around your waist  Do not bend over  Bend at the knees if you need to pick something up  Follow up with your doctor or gastroenterologist as directed:  Write down your questions so you remember to ask them during your visits  © Copyright [employer] 2022 Information is for End User's use only and may not be sold, redistributed or otherwise used for commercial purposes  All illustrations and images included in CareNotes® are the copyrighted property of A [employer] A M , Inc  or [facility] [place][note 2] Wilman santhosh   The above information is an  only  It is not intended as medical advice for individual conditions or treatments  Talk to your doctor, nurse or pharmacist before following any medical regimen to see if it is safe and effective for you  suprapubic region

## 2022-04-01 NOTE — ASSESSMENT & PLAN NOTE
- Follow up with Endocrinology (Patient aware)  - Follow up with PCP  - CHANGE Levothyroxine dosing to 125mcg by mouth once per day

## 2022-04-03 ENCOUNTER — HOSPITAL ENCOUNTER (EMERGENCY)
Facility: HOSPITAL | Age: 72
Discharge: HOME | End: 2022-04-03
Attending: EMERGENCY MEDICINE | Admitting: EMERGENCY MEDICINE
Payer: MEDICARE

## 2022-04-03 ENCOUNTER — NURSE TRIAGE (OUTPATIENT)
Dept: OTHER | Facility: OTHER | Age: 72
End: 2022-04-03

## 2022-04-03 VITALS
BODY MASS INDEX: 29.23 KG/M2 | DIASTOLIC BLOOD PRESSURE: 87 MMHG | TEMPERATURE: 98.4 F | SYSTOLIC BLOOD PRESSURE: 149 MMHG | OXYGEN SATURATION: 97 % | WEIGHT: 165 LBS | RESPIRATION RATE: 20 BRPM | HEART RATE: 110 BPM

## 2022-04-03 DIAGNOSIS — T78.40XA ALLERGIC REACTION, INITIAL ENCOUNTER: Primary | ICD-10-CM

## 2022-04-03 DIAGNOSIS — R22.0 MILD TONGUE SWELLING: ICD-10-CM

## 2022-04-03 LAB
ALBUMIN SERPL BCP-MCNC: 3.8 G/DL (ref 3.5–5)
ALP SERPL-CCNC: 90 U/L (ref 46–116)
ALT SERPL W P-5'-P-CCNC: 84 U/L (ref 12–78)
ANION GAP SERPL CALCULATED.3IONS-SCNC: 12 MMOL/L (ref 4–13)
AST SERPL W P-5'-P-CCNC: 50 U/L (ref 5–45)
BASOPHILS # BLD AUTO: 0.02 THOUSANDS/ΜL (ref 0–0.1)
BASOPHILS NFR BLD AUTO: 0 % (ref 0–1)
BILIRUB SERPL-MCNC: 0.42 MG/DL (ref 0.2–1)
BUN SERPL-MCNC: 22 MG/DL (ref 5–25)
CALCIUM SERPL-MCNC: 9.2 MG/DL (ref 8.3–10.1)
CHLORIDE SERPL-SCNC: 99 MMOL/L (ref 100–108)
CO2 SERPL-SCNC: 27 MMOL/L (ref 21–32)
CREAT SERPL-MCNC: 2.27 MG/DL (ref 0.6–1.3)
EOSINOPHIL # BLD AUTO: 0.21 THOUSAND/ΜL (ref 0–0.61)
EOSINOPHIL NFR BLD AUTO: 3 % (ref 0–6)
ERYTHROCYTE [DISTWIDTH] IN BLOOD BY AUTOMATED COUNT: 14.1 % (ref 11.6–15.1)
GFR SERPL CREATININE-BSD FRML MDRD: 20 ML/MIN/1.73SQ M
GLUCOSE SERPL-MCNC: 133 MG/DL (ref 65–140)
HCT VFR BLD AUTO: 37.7 % (ref 34.8–46.1)
HGB BLD-MCNC: 12.6 G/DL (ref 11.5–15.4)
IMM GRANULOCYTES # BLD AUTO: 0.02 THOUSAND/UL (ref 0–0.2)
IMM GRANULOCYTES NFR BLD AUTO: 0 % (ref 0–2)
LYMPHOCYTES # BLD AUTO: 2.16 THOUSANDS/ΜL (ref 0.6–4.47)
LYMPHOCYTES NFR BLD AUTO: 25 % (ref 14–44)
MAGNESIUM SERPL-MCNC: 1.8 MG/DL (ref 1.6–2.6)
MCH RBC QN AUTO: 29.9 PG (ref 26.8–34.3)
MCHC RBC AUTO-ENTMCNC: 33.4 G/DL (ref 31.4–37.4)
MCV RBC AUTO: 90 FL (ref 82–98)
MONOCYTES # BLD AUTO: 0.57 THOUSAND/ΜL (ref 0.17–1.22)
MONOCYTES NFR BLD AUTO: 7 % (ref 4–12)
NEUTROPHILS # BLD AUTO: 5.55 THOUSANDS/ΜL (ref 1.85–7.62)
NEUTS SEG NFR BLD AUTO: 65 % (ref 43–75)
NRBC BLD AUTO-RTO: 0 /100 WBCS
PHOSPHATE SERPL-MCNC: 3.7 MG/DL (ref 2.3–4.1)
PLATELET # BLD AUTO: 224 THOUSANDS/UL (ref 149–390)
PMV BLD AUTO: 11.1 FL (ref 8.9–12.7)
POTASSIUM SERPL-SCNC: 4 MMOL/L (ref 3.5–5.3)
PROT SERPL-MCNC: 7.3 G/DL (ref 6.4–8.2)
RBC # BLD AUTO: 4.21 MILLION/UL (ref 3.81–5.12)
SODIUM SERPL-SCNC: 138 MMOL/L (ref 136–145)
WBC # BLD AUTO: 8.53 THOUSAND/UL (ref 4.31–10.16)

## 2022-04-03 PROCEDURE — 83735 ASSAY OF MAGNESIUM: CPT | Performed by: EMERGENCY MEDICINE

## 2022-04-03 PROCEDURE — 99284 EMERGENCY DEPT VISIT MOD MDM: CPT | Performed by: EMERGENCY MEDICINE

## 2022-04-03 PROCEDURE — 80053 COMPREHEN METABOLIC PANEL: CPT | Performed by: EMERGENCY MEDICINE

## 2022-04-03 PROCEDURE — 85025 COMPLETE CBC W/AUTO DIFF WBC: CPT | Performed by: EMERGENCY MEDICINE

## 2022-04-03 PROCEDURE — 96374 THER/PROPH/DIAG INJ IV PUSH: CPT

## 2022-04-03 PROCEDURE — 84100 ASSAY OF PHOSPHORUS: CPT | Performed by: EMERGENCY MEDICINE

## 2022-04-03 PROCEDURE — 99283 EMERGENCY DEPT VISIT LOW MDM: CPT

## 2022-04-03 PROCEDURE — 36415 COLL VENOUS BLD VENIPUNCTURE: CPT | Performed by: EMERGENCY MEDICINE

## 2022-04-03 PROCEDURE — 96375 TX/PRO/DX INJ NEW DRUG ADDON: CPT

## 2022-04-03 RX ORDER — DIPHENHYDRAMINE HCL 25 MG
25 TABLET ORAL EVERY 6 HOURS PRN
Qty: 20 TABLET | Refills: 0 | Status: SHIPPED | OUTPATIENT
Start: 2022-04-03 | End: 2022-04-14 | Stop reason: HOSPADM

## 2022-04-03 RX ORDER — PREDNISONE 20 MG/1
20 TABLET ORAL DAILY
Qty: 3 TABLET | Refills: 0 | Status: SHIPPED | OUTPATIENT
Start: 2022-04-03 | End: 2022-04-06

## 2022-04-03 RX ORDER — DIPHENHYDRAMINE HYDROCHLORIDE 50 MG/ML
25 INJECTION INTRAMUSCULAR; INTRAVENOUS ONCE
Status: COMPLETED | OUTPATIENT
Start: 2022-04-03 | End: 2022-04-03

## 2022-04-03 RX ORDER — METHYLPREDNISOLONE SODIUM SUCCINATE 125 MG/2ML
125 INJECTION, POWDER, LYOPHILIZED, FOR SOLUTION INTRAMUSCULAR; INTRAVENOUS ONCE
Status: COMPLETED | OUTPATIENT
Start: 2022-04-03 | End: 2022-04-03

## 2022-04-03 RX ADMIN — METHYLPREDNISOLONE SODIUM SUCCINATE 125 MG: 125 INJECTION, POWDER, FOR SOLUTION INTRAMUSCULAR; INTRAVENOUS at 18:32

## 2022-04-03 RX ADMIN — DIPHENHYDRAMINE HYDROCHLORIDE 25 MG: 50 INJECTION, SOLUTION INTRAMUSCULAR; INTRAVENOUS at 18:30

## 2022-04-03 NOTE — TELEPHONE ENCOUNTER
Regarding: Jittery and tongue feels thick   ----- Message from Josefina Gonzalez sent at 4/3/2022  4:52 PM EDT -----  " My tongue feels thick and I feel very jittery after taking the medication bactrim "

## 2022-04-03 NOTE — ED PROVIDER NOTES
History  Chief Complaint   Patient presents with    Tongue Swelling     pt states she started taking bactrim last night and woke up today with her tongue feeling "thick and uncomfortable"     Patient is a 76-year-old female with past medical history of CKD, GERD, COPD, and was recently discharged on 04/01/2022 for gastroenteritis and UTI  Her  Amber Reynolds) is also bedside  She presents to the hospital today due to tongue swelling and feeling jittery  She woke up today feeling like her tongue felt "thick and like my taste buds are hitting the top of my mouth   She denies any burning sensation on her tongue  She feels as though this happened after she began taking her Bactrim  She was able to eat breakfast today which consisted of eggs, English muffin, and decaf tea - no prior history of allergies to these foods  She also reports of feeling very congested and that her head was "feeling enlarged"  She says she feels jittery and states she had palpitations earlier today that have now resolved  During her recent hospitalization, she was discharged on Bactrim and her dose of levothyroxine was decreased from 137mcg to 125 mcg  She reports compliance with all her medications  Besides this, she has no other complaints  Prior to Admission Medications   Prescriptions Last Dose Informant Patient Reported? Taking?    B-D ULTRAFINE III SHORT PEN 31G X 8 MM MISC  Self Yes No   Sig: USE AS DIRECTED 4 TIMES PER DAY   DULoxetine (CYMBALTA) 60 mg delayed release capsule  Self Yes No   Sig: Take 90 mg by mouth daily   DULoxetine (Cymbalta) 30 mg delayed release capsule  Self Yes No   Sig: Take 30 mg by mouth daily   Icosapent Ethyl (Vascepa) 1 g CAPS  Self Yes No   Sig: Take 1 g by mouth 2 (two) times a day   Multiple Vitamin (multivitamin) capsule  Self Yes No   Sig: Take 1 capsule by mouth daily   QUEtiapine (SEROquel) 25 mg tablet  Self No No   Sig: Take 1 tablet (25 mg total) by mouth daily at bedtime   Vitamin D, Ergocalciferol, 2000 units CAPS  Self Yes No   Sig: Take 2,000 Units by mouth daily    acetaminophen (TYLENOL) 500 mg tablet  Self Yes No   Sig: Take 1,000 mg by mouth as needed    albuterol (Ventolin HFA) 90 mcg/act inhaler  Self No No   Sig: Inhale 2 puffs every 6 (six) hours as needed for wheezing or shortness of breath   amLODIPine (NORVASC) 5 mg tablet   No No   Sig: Take 1 tablet (5 mg total) by mouth daily   b complex vitamins tablet  Self Yes No   Sig: Take 1 tablet by mouth daily    fluticasone (FLONASE) 50 mcg/act nasal spray  Self No No   Si-2 sprays into each nostril daily   gabapentin (NEURONTIN) 100 mg capsule  Self Yes No   Sig: Take 100 mg by mouth daily   hydrALAZINE (APRESOLINE) 25 mg tablet   No No   Sig: TAKE 1 TABLET BY MOUTH TWICE A DAY   insulin aspart (NovoLOG) 100 units/mL injection  Self Yes No   Sig: Inject under the skin 3 (three) times a day before meals 14 units, 14 units, 18 units     insulin detemir (Levemir FlexTouch) 100 Units/mL injection pen  Self Yes No   Sig: Inject 50 Units under the skin every evening    levothyroxine 125 mcg tablet   No No   Sig: Take 1 tablet (125 mcg total) by mouth daily in the early morning   metoprolol tartrate (LOPRESSOR) 50 mg tablet  Self No No   Sig: Take 1 tablet (50 mg total) by mouth every 12 (twelve) hours   olmesartan (BENICAR) 40 mg tablet  Self No No   Sig: TAKE 1 TABLET BY MOUTH EVERY DAY   pantoprazole (PROTONIX) 40 mg tablet   No No   Sig: Take 1 tablet (40 mg total) by mouth daily in the early morning   pramipexole (MIRAPEX) 0 25 mg tablet  Self No No   Sig: Take 1 tablet (0 25 mg total) by mouth daily at bedtime   rosuvastatin (CRESTOR) 20 MG tablet   No No   Sig: Take 1 tablet (20 mg total) by mouth daily   saccharomyces boulardii (FLORASTOR) 250 mg capsule   No No   Sig: Take 1 capsule (250 mg total) by mouth 2 (two) times a day   spironolactone (ALDACTONE) 25 mg tablet   No No   Sig: Take 1 tablet (25 mg total) by mouth 3 (three) times a week   sulfamethoxazole-trimethoprim (BACTRIM) 400-80 mg per tablet   No No   Sig: Take 1 tablet by mouth every 12 (twelve) hours for 5 days   torsemide (DEMADEX) 20 mg tablet  Self No No   Sig: TAKE 1 TABLET BY MOUTH EVERY DAY      Facility-Administered Medications: None       Past Medical History:   Diagnosis Date    Allergic     Anesthesia     pt reports "had to use double lumen endo tube for hiatal hernia repair/so surgeon could get to where he needed to work"    Arthritis     Aspiration into airway     Basal cell carcinoma 2007    left cheek     BCC (basal cell carcinoma) 05/27/2021    Left Nasal tip    [facility]     squamous cell cancer on forhead    Cancer [facility] )     basal cell on nose     Chronic kidney disease 2000, 2018    Stones, kidney disease stage 4    Chronic pain disorder     bilat feet and joint pain on occas    Colon polyp     COVID-19 08/2021    recovered at home/did receive monoclonal infusion    CPAP (continuous positive airway pressure) dependence     not using as has been recalled    Dental bridge present     Depression     Diabetes mellitus ([facility] )     Type 2    Diabetic neuropathy (HCC)     Disease of thyroid gland     Family history of thyroid problem     Fatty liver     GERD (gastroesophageal reflux disease)     Heart burn     History of pneumonia     Hyperlipidemia     Hyperplasia[facility] parathyroid [facility]     Hypertension     Kidney problem     Kidney stone     Motion sickness     Obesity (BMI 30 0-34  9)     Pollen allergies     RLS (restless legs syndrome)     SCC (squamous cell carcinoma) 05/04/2021    left mid forehead    Seasonal allergies     Sleep apnea     uses CPAP    Squamous cell skin cancer 2007    left cheek     Swollen ankles     Wears glasses        Past Surgical History:   Procedure Laterality Date    ARTHROSCOPY KNEE      BREAST BIOPSY      stereotactic-benign    BREAST BIOPSY      stereo-benign    BREAST EXCISIONAL BIOPSY unknown date-benign    BREAST EXCISIONAL BIOPSY      unknown date-benign    BREAST EXCISIONAL BIOPSY      unknown date-benign    BREAST EXCISIONAL BIOPSY      unknown age-benign    CHOLECYSTECTOMY      COLONOSCOPY      EXAMINATION UNDER ANESTHESIA N/A 6/24/2021    Procedure: EXAM UNDER ANESTHESIA (EUA), DISE;  Surgeon: Britney Gallardo MD;  [facility];  Service: ENT    HERNIA REPAIR      HIATAL HERNIA REPAIR      LIPOSUCTION      MOHS SURGERY  05/20/2021    left mid forehead-Gautam    MOHS SURGERY Left 05/27/2021    Left nasal tip- gautam     OR INCISION IMPLANT CRANIAL NERVE STIM ELECTRODE/PULSE GEN N/A 11/10/2021    Procedure: INSERTION UPPER AIRWAY STIMULATOR, INSPIRE IMPLANT;  Surgeon: Britney Gallardo MD;  Location[facility];  Service: ENT    REDUCTION MAMMAPLASTY Bilateral 2000    REDUCTION MAMMAPLASTY      SKIN BIOPSY      SKIN CANCER EXCISION  2007    squamous cell carcinoma     SKIN CANCER EXCISION  2007    basal cell carcinoma    SQUAMOUS CELL CARCINOMA EXCISION      TOE SURGERY      TONSILLECTOMY      TUBAL LIGATION      UPPER GASTROINTESTINAL ENDOSCOPY         Family History   Problem Relation Age of Onset    Heart disease Mother     Depression Mother     Hypertension Mother     COPD Mother     Hearing loss Mother     Heart disease Father     Lung cancer Father 79        Smoker     Cancer Father         brain    Alcohol abuse Father     Dementia Father     Hypertension Father     Thyroid disease Father     COPD Father     Arthritis Father     Brain cancer Father 76    Hypertension Sister     Diabetes Sister     Heart disease Sister     Thyroid disease Sister     Cancer Sister         Lympoma    Lung cancer Sister 78    Hypertension Brother     Diabetes Brother     Cancer Brother         Throat    Dementia Brother     Stroke Brother     Hypertension Brother     No Known Problems Son     No Known Problems Son     Heart disease Brother     Diabetes Brother     Brain cancer Paternal Aunt         unknown age     I have reviewed and agree with the history as documented  E-Cigarette/Vaping    E-Cigarette Use Never User      E-Cigarette/Vaping Substances    Nicotine No     THC No     CBD No     Flavoring No     Other No     Unknown No      Social History     Tobacco Use    Smoking status: Former Smoker     Packs/day: 2 00     Years: 10 00     Pack years: 20 00     Types: Cigarettes     Start date: 1967     Quit date:      Years since quittin 4    Smokeless tobacco: Never Used   Vaping Use    Vaping Use: Never used   Substance Use Topics    Alcohol use: Yes     Alcohol/week: 0 0 standard drinks     Comment: rarely a sip     Drug use: No        Review of Systems   Constitutional: Positive for fatigue  Negative for activity change, appetite change, chills and fever  HENT: Positive for congestion  Negative for rhinorrhea and sore throat  Tongue feels thick and itchy   Eyes: Negative for visual disturbance  Respiratory: Negative for cough, chest tightness and shortness of breath  Cardiovascular: Positive for palpitations  Negative for chest pain and leg swelling  Gastrointestinal: Negative for abdominal distention, abdominal pain, diarrhea, nausea and vomiting  Genitourinary: Negative for dysuria  Neurological: Negative for dizziness, light-headedness and headaches  Psychiatric/Behavioral: The patient is nervous/anxious  Physical Exam  ED Triage Vitals [22 1732]   Temperature Pulse Respirations Blood Pressure SpO2   98 4 °F (36 9 °C) (!) 110 20 149/87 97 %      Temp Source Heart Rate Source Patient Position - Orthostatic VS BP Location FiO2 (%)   Oral Monitor Sitting Left arm --      Pain Score       No Pain             Orthostatic Vital Signs  Vitals:    22 1732   BP: 149/87   Pulse: (!) 110   Patient Position - Orthostatic VS: Sitting       Physical Exam  Vitals and nursing note reviewed  Constitutional:       Appearance: Normal appearance  She is not ill-appearing or diaphoretic  HENT:      Head: Normocephalic and atraumatic  Nose: No rhinorrhea  Mouth/Throat:      Comments: Tongue did not appear significantly edematous  Palpable appear to be normal in size  No erythema noted in oral cavity  Eyes:      General: No scleral icterus  Conjunctiva/sclera: Conjunctivae normal    Cardiovascular:      Rate and Rhythm: Regular rhythm  Tachycardia present  Pulses: Normal pulses  Heart sounds: Normal heart sounds  No murmur heard  No friction rub  Pulmonary:      Effort: Pulmonary effort is normal       Breath sounds: Normal breath sounds  Chest:      Chest wall: No tenderness  Abdominal:      General: Abdomen is flat  Bowel sounds are normal  There is no distension  Palpations: Abdomen is soft  Tenderness: There is no abdominal tenderness  There is no guarding or rebound  Musculoskeletal:         General: No tenderness  Right lower leg: No edema  Left lower leg: No edema  Skin:     General: Skin is warm  Neurological:      Mental Status: She is alert     Psychiatric:         Mood and Affect: Mood normal          Behavior: Behavior normal          ED Medications  Medications   diphenhydrAMINE (BENADRYL) injection 25 mg (25 mg Intravenous Given 4/3/22 1830)   methylPREDNISolone sodium succinate (Solu-MEDROL) injection 125 mg (125 mg Intravenous Given 4/3/22 1832)       Diagnostic Studies  Results Reviewed     Procedure Component Value Units Date/Time    CBC and differential [[id number]] Collected: 04/03/22 1826    Lab Status: Final result Specimen: Blood from Arm, Right Updated: 04/03/22 1843     WBC 8 53 Thousand/uL      RBC 4 21 Million/uL      Hemoglobin 12 6 g/dL      Hematocrit 37 7 %      MCV 90 fL      MCH 29 9 pg      MCHC 33 4 g/dL      RDW 14 1 %      MPV 11 1 fL      Platelets 257 Thousands/uL      nRBC 0 /100 WBCs      Neutrophils Relative 65 %      Immat GRANS % 0 %      Lymphocytes Relative 25 %      Monocytes Relative 7 %      Eosinophils Relative 3 %      Basophils Relative 0 %      Neutrophils Absolute 5 55 Thousands/µL      Immature Grans Absolute 0 02 Thousand/uL      Lymphocytes Absolute 2 16 Thousands/µL      Monocytes Absolute 0 57 Thousand/µL      Eosinophils Absolute 0 21 Thousand/µL      Basophils Absolute 0 02 Thousands/µL     Comprehensive metabolic panel [[id number]] Collected: 04/03/22 1826    Lab Status: In process Specimen: Blood from Arm, Right Updated: 04/03/22 1838    Phosphorus [[id number]] Collected: 04/03/22 1826    Lab Status: In process Specimen: Blood from Arm, Right Updated: 04/03/22 1838    Magnesium [[id number]] Collected: 04/03/22 1826    Lab Status: In process Specimen: Blood from Arm, Right Updated: 04/03/22 1838                 No orders to display         Procedures  Procedures      ED Course  ED Course as of 04/03/22 1849   Sun Apr 03, 2022   1847 Pt reports of improvement in tongue swelling and congestion after receiving medications - benadryl and steroid  Stable for discharge and pt is agreeable to discharge back to home  MDM  Number of Diagnoses or Management Options  Allergic reaction, initial encounter: minor  Mild tongue swelling: minor  Diagnosis management comments: Tongue did not appear largely edematous  Patient states that her tongue felt swollen and may have been related to taking Bactrim  However, patient received 2 doses of Bactrim when she was hospitalized and she did not have these symptoms before  Will give 1 L bolus of 0 9% normal saline, 25 mg Benadryl, and 125 mg methylprednisolone  Will also obtain blood work  - Reported of improvement in tongue swelling after benadryl and methylprednisolone  - Blood work has been unremarkable  - Will discharge patient on short course of steroid and benadryl as needed         Amount and/or Complexity of Data Reviewed  Discuss the patient with other providers: yes        Disposition  Final diagnoses: Allergic reaction, initial encounter   Mild tongue swelling     Time reflects when diagnosis was documented in both MDM as applicable and the Disposition within this note     Time User Action Codes Description Comment    4/3/2022  6:15 PM Lex Masker Add [T78 [id number]] Allergic reaction, initial encounter     4/3/2022  6:15 PM Fech, Randal Jefferson Add [R22 0] Mild tongue swelling     4/3/2022  6:15 PM Fech, Randal Jefferson Remove [R22 0] Mild tongue swelling     4/3/2022  6:15 PM Lendel Link Add [R22 0] Mild tongue swelling       ED Disposition     ED Disposition Condition Date/Time Comment    Discharge Stable Sun Apr 3, 2022  6:43 PM Sergio Blantono discharge to home/self care  Follow-up Information     Follow up With Specialties Details Why Contact Info    Lenard Simon MD Family Medicine Schedule an appointment as soon as possible for a visit in 2 days  [facility] [address]  [address]  [place] [address]  [phone]            Patient's Medications   Discharge Prescriptions    DIPHENHYDRAMINE (BENADRYL) 25 MG TABLET    Take 1 tablet (25 mg total) by mouth every 6 (six) hours as needed (facial swelling)       Start Date: 4/3/2022  End Date: --       Order Dose: 25 mg       Quantity: 20 tablet    Refills: 0    PREDNISONE 20 MG TABLET    Take 1 tablet (20 mg total) by mouth daily for 3 days       Start Date: 4/3/2022  End Date: 4/6/2022       Order Dose: 20 mg       Quantity: 3 tablet    Refills: 0     No discharge procedures on file  PDMP Review       Value Time User    PDMP Reviewed  Yes 11/10/2021  3:47 PM Kady Vickers MD           ED Provider  Attending physically available and evaluated Sergio Gibbons I managed the patient along with the ED Attending      Electronically Signed by         Ollis Boeck, DO  04/03/22 9602

## 2022-04-03 NOTE — DISCHARGE INSTRUCTIONS
Diagnosis: Allergic reaction  You will be prescribed a short course of steroids to take:  - 20mg prednisone to take once a day for 3 days  - You can take benadryl 25mg by mouth every 6 hours as needed for any facial swelling  Return to the ED if your breathing worsens

## 2022-04-03 NOTE — TELEPHONE ENCOUNTER
Reason for Disposition   All other adults with swollen tongue   (Exception: tongue swelling is a recurrent problem AND NO swelling at present)    Answer Assessment - Initial Assessment Questions  1  ONSET: "When did the swelling start?" (e g , minutes, hours, days)      This morning after taking Bactrim (also had a dose last night)  Started Bactrim in hospital      2  LOCATION: "What part of the tongue is swollen?"      "The sides of my tongue"    3  SEVERITY: "How swollen is it?"      "Only a little swollen but it's getting in the way and I'm so congested in my sinuses and nose  I'm shaky all over and I have to keep clearing my throat "    4  CAUSE: "What do you think is causing the tongue swelling?" (e g , hx of angioedema, allergies)      Unknown        5  RECURRENT SYMPTOM: "Have you had tongue swelling before?" If Yes, ask: "When was the last time?" "What happened that time?"     Denies      6  OTHER SYMPTOMS: "Do you have any other symptoms?" (e g , difficulty breathing, facial swelling)     "I feel very jittery", nasal/sinus congestion since this morning  "Heavy" stomach  Throat feels slightly swollen  Also reports an episode of SOB this morning that subsided after using her inhaler  Has to clear her throat often      Has also started taking Florastor while in hospital     Protocols used: TONGUE [facility]

## 2022-04-05 ENCOUNTER — OFFICE VISIT (OUTPATIENT)
Dept: FAMILY MEDICINE CLINIC | Facility: CLINIC | Age: 72
End: 2022-04-05
Payer: MEDICARE

## 2022-04-05 ENCOUNTER — HOSPITAL ENCOUNTER (EMERGENCY)
Facility: HOSPITAL | Age: 72
Discharge: HOME | End: 2022-04-05
Attending: EMERGENCY MEDICINE | Admitting: EMERGENCY MEDICINE
Payer: MEDICARE

## 2022-04-05 ENCOUNTER — APPOINTMENT (EMERGENCY)
Dept: RADIOLOGY | Facility: HOSPITAL | Age: 72
End: 2022-04-05
Payer: MEDICARE

## 2022-04-05 VITALS
DIASTOLIC BLOOD PRESSURE: 80 MMHG | HEIGHT: 63 IN | SYSTOLIC BLOOD PRESSURE: 145 MMHG | RESPIRATION RATE: 18 BRPM | BODY MASS INDEX: 29.59 KG/M2 | OXYGEN SATURATION: 96 % | HEART RATE: 103 BPM | WEIGHT: 167 LBS | TEMPERATURE: 98.5 F

## 2022-04-05 VITALS
WEIGHT: 167 LBS | HEIGHT: 63 IN | SYSTOLIC BLOOD PRESSURE: 128 MMHG | RESPIRATION RATE: 16 BRPM | TEMPERATURE: 98.9 F | HEART RATE: 122 BPM | DIASTOLIC BLOOD PRESSURE: 70 MMHG | BODY MASS INDEX: 29.59 KG/M2 | OXYGEN SATURATION: 91 %

## 2022-04-05 DIAGNOSIS — R07.9 CHEST PAIN, UNSPECIFIED TYPE: ICD-10-CM

## 2022-04-05 DIAGNOSIS — N18.9 ACUTE KIDNEY INJURY SUPERIMPOSED ON CHRONIC KIDNEY DISEASE (HCC): ICD-10-CM

## 2022-04-05 DIAGNOSIS — R06.02 SOB (SHORTNESS OF BREATH): Primary | ICD-10-CM

## 2022-04-05 DIAGNOSIS — N39.0 URINARY TRACT INFECTION WITHOUT HEMATURIA, SITE UNSPECIFIED: ICD-10-CM

## 2022-04-05 DIAGNOSIS — N17.9 ACUTE KIDNEY INJURY SUPERIMPOSED ON CHRONIC KIDNEY DISEASE (HCC): ICD-10-CM

## 2022-04-05 LAB
2HR DELTA HS TROPONIN: 0 NG/L
BACTERIA UR QL AUTO: NORMAL /HPF
BASOPHILS # BLD AUTO: 0.02 THOUSANDS/ΜL (ref 0–0.1)
BASOPHILS NFR BLD AUTO: 0 % (ref 0–1)
BILIRUB UR QL STRIP: NEGATIVE
CARDIAC TROPONIN I PNL SERPL HS: 6 NG/L
CARDIAC TROPONIN I PNL SERPL HS: 6 NG/L
CLARITY UR: CLEAR
COLOR UR: YELLOW
D DIMER PPP FEU-MCNC: 0.27 UG/ML FEU
EOSINOPHIL # BLD AUTO: 0 THOUSAND/ΜL (ref 0–0.61)
EOSINOPHIL NFR BLD AUTO: 0 % (ref 0–6)
ERYTHROCYTE [DISTWIDTH] IN BLOOD BY AUTOMATED COUNT: 14.6 % (ref 11.6–15.1)
GLUCOSE UR STRIP-MCNC: NEGATIVE MG/DL
HCT VFR BLD AUTO: 36.2 % (ref 34.8–46.1)
HGB BLD-MCNC: 12.2 G/DL (ref 11.5–15.4)
HGB UR QL STRIP.AUTO: NEGATIVE
IMM GRANULOCYTES # BLD AUTO: 0.23 THOUSAND/UL (ref 0–0.2)
IMM GRANULOCYTES NFR BLD AUTO: 1 % (ref 0–2)
KETONES UR STRIP-MCNC: NEGATIVE MG/DL
LEUKOCYTE ESTERASE UR QL STRIP: ABNORMAL
LYMPHOCYTES # BLD AUTO: 1.36 THOUSANDS/ΜL (ref 0.6–4.47)
LYMPHOCYTES NFR BLD AUTO: 7 % (ref 14–44)
MCH RBC QN AUTO: 29.9 PG (ref 26.8–34.3)
MCHC RBC AUTO-ENTMCNC: 33.7 G/DL (ref 31.4–37.4)
MCV RBC AUTO: 89 FL (ref 82–98)
MONOCYTES # BLD AUTO: 0.56 THOUSAND/ΜL (ref 0.17–1.22)
MONOCYTES NFR BLD AUTO: 3 % (ref 4–12)
NEUTROPHILS # BLD AUTO: 17.33 THOUSANDS/ΜL (ref 1.85–7.62)
NEUTS SEG NFR BLD AUTO: 89 % (ref 43–75)
NITRITE UR QL STRIP: NEGATIVE
NON-SQ EPI CELLS URNS QL MICRO: NORMAL /HPF
NRBC BLD AUTO-RTO: 0 /100 WBCS
PH UR STRIP.AUTO: 6 [PH]
PLATELET # BLD AUTO: 280 THOUSANDS/UL (ref 149–390)
PMV BLD AUTO: 11 FL (ref 8.9–12.7)
PROT UR STRIP-MCNC: NEGATIVE MG/DL
RBC # BLD AUTO: 4.08 MILLION/UL (ref 3.81–5.12)
RBC #/AREA URNS AUTO: NORMAL /HPF
SP GR UR STRIP.AUTO: <=1.005 (ref 1–1.03)
UROBILINOGEN UR QL STRIP.AUTO: 0.2 E.U./DL
WBC # BLD AUTO: 19.5 THOUSAND/UL (ref 4.31–10.16)
WBC #/AREA URNS AUTO: NORMAL /HPF

## 2022-04-05 PROCEDURE — 85025 COMPLETE CBC W/AUTO DIFF WBC: CPT | Performed by: EMERGENCY MEDICINE

## 2022-04-05 PROCEDURE — 99285 EMERGENCY DEPT VISIT HI MDM: CPT

## 2022-04-05 PROCEDURE — 85379 FIBRIN DEGRADATION QUANT: CPT | Performed by: EMERGENCY MEDICINE

## 2022-04-05 PROCEDURE — 99496 TRANSJ CARE MGMT HIGH F2F 7D: CPT | Performed by: FAMILY MEDICINE

## 2022-04-05 PROCEDURE — 84484 ASSAY OF TROPONIN QUANT: CPT | Performed by: EMERGENCY MEDICINE

## 2022-04-05 PROCEDURE — 93005 ELECTROCARDIOGRAM TRACING: CPT

## 2022-04-05 PROCEDURE — 36415 COLL VENOUS BLD VENIPUNCTURE: CPT | Performed by: EMERGENCY MEDICINE

## 2022-04-05 PROCEDURE — 81001 URINALYSIS AUTO W/SCOPE: CPT | Performed by: EMERGENCY MEDICINE

## 2022-04-05 PROCEDURE — 71045 X-RAY EXAM CHEST 1 VIEW: CPT

## 2022-04-05 PROCEDURE — 99285 EMERGENCY DEPT VISIT HI MDM: CPT | Performed by: EMERGENCY MEDICINE

## 2022-04-05 PROCEDURE — 93000 ELECTROCARDIOGRAM COMPLETE: CPT | Performed by: FAMILY MEDICINE

## 2022-04-05 NOTE — PROGRESS NOTES
Assessment/Plan:    No problem-specific Assessment & Plan notes found for this encounter  Diagnoses and all orders for this visit:    SOB (shortness of breath)  Comments:  conversational dyspnea is new and she needs further testing   ? of echo/VQ to r/o clot, cxr  will refer to ED  Orders:  -     POCT ECG  -     Transfer to other facility    Chest pain, unspecified type  Comments:  with exertion  negative stress in the fall  refer to ED  Orders:  -     POCT ECG    Acute kidney injury superimposed on chronic kidney disease ([facility] [address] )  Comments:  stable      Urinary tract infection without hematuria, site unspecified  Comments:  will need repeat UA C&S   off bactrim early    Other orders  -     DULoxetine HCl (CYMBALTA PO); Take 40 mg by mouth 2 (two) times a day        Subjective:      Patient ID: Sugey Jara is a 66 y o  female  HPI   Pt presents in f/u for hospitalization for presumed viral gastroenteritis/dehyrdation/lexie/uti  She was seen later for acute allergic rxn to bactrim for uti and placed on prednisone 60mg x 3 days  She denies further diarrhea  Repeat Creatinine 2 2 which is a bit higher than her baseline but similar to d/c value  Her tsh was low and levo dose was decreased as an inpt  No diarrhea since Saturday  +flatus but no BM since then  Today, she presents saying she feels unwell  Feels jittery and heart is racing for the past few days  This could be explained by her prednisone, use, but more concerningly, she states she has now developed conversational dyspnea and shortness of breath with exertion and at rest   No fevers  +hx aspiration  No swelling or pain in her legs  She c/o burning in her chest after walking a block or so  Had negative stress in the fall  States that this morning she felt her heart rate was high and also that she experienced a Sensation of fluttering in her chest or a skipped beat  +hx of PAF             The following portions of the patient's history were reviewed and updated as appropriate: allergies, current medications, past family history, past medical history, past social history, past surgical history and problem list     Review of Systems      Objective:      /70   Pulse 104   Temp 98 9 °F (37 2 °C)   Resp 16   Ht 5' 3" (1 6 m)   Wt 75 8 kg (167 lb)   SpO2 97%   BMI 29 58 kg/m²          Physical Exam  Constitutional:       General: She is not in acute distress  Appearance: She is well-developed  HENT:      Head: Normocephalic and atraumatic  Right Ear: Tympanic membrane, ear canal and external ear normal       Left Ear: Tympanic membrane, ear canal and external ear normal       Nose: Nose normal       Mouth/Throat:      Pharynx: No oropharyngeal exudate  Eyes:      Conjunctiva/sclera: Conjunctivae normal       Pupils: Pupils are equal, round, and reactive to light  Neck:      Thyroid: No thyromegaly  Vascular: No carotid bruit or JVD  Cardiovascular:      Rate and Rhythm: Regular rhythm  Tachycardia present  Heart sounds: S1 normal and S2 normal  No murmur heard  No friction rub  No gallop  No S3 or S4 sounds  Pulmonary:      Effort: Pulmonary effort is normal       Breath sounds: Normal breath sounds  No wheezing, rhonchi or rales  Abdominal:      General: Bowel sounds are normal  There is no distension  Palpations: Abdomen is soft  Tenderness: There is no abdominal tenderness  Lymphadenopathy:      Cervical: No cervical adenopathy  Neurological:      Mental Status: She is alert and oriented to person, place, and time  Cranial Nerves: No cranial nerve deficit  Sensory: No sensory deficit  Deep Tendon Reflexes: Reflexes are normal and symmetric

## 2022-04-05 NOTE — ED PROCEDURE NOTE
PROCEDURE  ECG 12 Lead Documentation Only    Date/Time: 4/5/2022 5:39 PM  Performed by: Piyush De Luna MD  Authorized by: Piyush De Luna MD     Indications / Diagnosis:  Sob  ECG reviewed by me, the ED Provider: yes    Patient location:  ED and bedside  Previous ECG:     Previous ECG:  Compared to current    Comparison ECG info:  5/17/21- other than increased rate- no other sign changes    Similarity:  Changes noted    Comparison to cardiac monitor: No    Interpretation:     Interpretation: non-specific    Rate:     ECG rate:  102    ECG rate assessment: tachycardic    Rhythm:     Rhythm: sinus tachycardia    Ectopy:     Ectopy: none    QRS:     QRS axis:  Normal    QRS intervals:  Normal  Conduction:     Conduction: normal    ST segments:     ST segments:  Normal  T waves:     T waves: flattening      Flattening:  AVL, III, aVF, V1, V2, V3, V4 and V5  Q waves:     Q waves:  III and aVF  Comments:      No ecg signs of ischemia/ injury /r heart strain / kristyn/pericarditis          Piyush De Luna MD  04/05/22 [id number]

## 2022-04-06 NOTE — ED PROCEDURE NOTE
PROCEDURE  ECG 12 Lead Documentation Only    Date/Time: 4/5/2022 8:19 PM  Performed by: Uzma Bravo MD  Authorized by: Uzma Bravo MD     Indications / Diagnosis:  Er ecg # 2   ECG reviewed by me, the ED Provider: yes    Patient location:  ED and bedside  Previous ECG:     Previous ECG:  Compared to current    Comparison ECG info:   When comapred to previous er ecg - no sign changes    Similarity:  No change    Comparison to cardiac monitor: Yes    Interpretation:     Interpretation: non-specific    Rate:     ECG rate:  91    ECG rate assessment: normal    Rhythm:     Rhythm: sinus rhythm    Ectopy:     Ectopy: none    QRS:     QRS axis:  Normal    QRS intervals:  Normal  Conduction:     Conduction: normal    ST segments:     ST segments:  Normal  T waves:     T waves: flattening      Flattening:  AVL, III, aVF, V1, V2, V3 and V4  Comments:      No ecg signs of ischemia/ injury / r heart s train / kristyn/pericarditis          Uzma Bravo MD  04/05/22 2022

## 2022-04-06 NOTE — DISCHARGE INSTRUCTIONS
Diagnosis; chest pain/shortness of breath     - activity as tolerated     - please stop taking the prednisone    - please return to  the er for any worsening chest pain/ any increasing difficulty breathing or any new/ worsening/concerning symptoms to you

## 2022-04-07 ENCOUNTER — TELEPHONE (OUTPATIENT)
Dept: NEPHROLOGY | Facility: CLINIC | Age: 72
End: 2022-04-07

## 2022-04-07 DIAGNOSIS — N18.4 STAGE 4 CHRONIC KIDNEY DISEASE (HCC): Primary | ICD-10-CM

## 2022-04-07 LAB
ATRIAL RATE: 102 BPM
ATRIAL RATE: 91 BPM
P AXIS: 55 DEGREES
P AXIS: 65 DEGREES
PR INTERVAL: 192 MS
PR INTERVAL: 196 MS
QRS AXIS: -18 DEGREES
QRS AXIS: -19 DEGREES
QRSD INTERVAL: 84 MS
QRSD INTERVAL: 84 MS
QT INTERVAL: 310 MS
QT INTERVAL: 326 MS
QTC INTERVAL: 400 MS
QTC INTERVAL: 404 MS
T WAVE AXIS: 36 DEGREES
T WAVE AXIS: 70 DEGREES
VENTRICULAR RATE: 102 BPM
VENTRICULAR RATE: 91 BPM

## 2022-04-07 PROCEDURE — 93010 ELECTROCARDIOGRAM REPORT: CPT | Performed by: INTERNAL MEDICINE

## 2022-04-07 NOTE — TELEPHONE ENCOUNTER
Spoke with patient letting her know to go for a BMP at her earliest convenience  She expressed understanding and thanked us for the call

## 2022-04-07 NOTE — TELEPHONE ENCOUNTER
Patient called because she was recently in the hospital and when she went in her creatinine was 2 7 and when she was discharged it was 2 2  She was wondering what Dr Kennedi Benavidez suggests she do

## 2022-04-07 NOTE — TELEPHONE ENCOUNTER
We definitely need a follow-up basic metabolic profile at this time  But I would need to speak with the family physician immediately because her WBC count was extremely elevated when she went into the emergency room!

## 2022-04-10 NOTE — ED PROVIDER NOTES
History  Chief Complaint   Patient presents with    Shortness of Breath     patient states has been short of breath since yesterday and has been feeling jittery  states she started steroids on   Patient states chest burns with exercise      66 yr female recently placed on prednisone -- c/o mild sob  And burning pain in chest - thru out day today -- no fever/uri/new cough -- no other comps - no gerd/dyspepsia/ progressive dysphagia/ melena/ weight loss      History provided by:  Patient   used: No    Shortness of Breath  Severity:  Mild  Onset quality:  Gradual  Duration:  1 day  Associated symptoms: chest pain    Associated symptoms: no cough and no wheezing        Prior to Admission Medications   Prescriptions Last Dose Informant Patient Reported? Taking?    B-D ULTRAFINE III SHORT PEN 31G X 8 MM MISC  Self Yes No   Sig: USE AS DIRECTED 4 TIMES PER DAY   DULoxetine HCl (CYMBALTA PO)   Yes No   Sig: Take 40 mg by mouth 2 (two) times a day   Icosapent Ethyl (Vascepa) 1 g CAPS  Self Yes No   Sig: Take 1 g by mouth 2 (two) times a day   Multiple Vitamin (multivitamin) capsule  Self Yes No   Sig: Take 1 capsule by mouth daily   Vitamin D, Ergocalciferol, 2000 units CAPS  Self Yes No   Sig: Take 2,000 Units by mouth daily    acetaminophen (TYLENOL) 500 mg tablet  Self Yes No   Sig: Take 1,000 mg by mouth as needed    albuterol (Ventolin HFA) 90 mcg/act inhaler  Self No No   Sig: Inhale 2 puffs every 6 (six) hours as needed for wheezing or shortness of breath   amLODIPine (NORVASC) 5 mg tablet   No No   Sig: Take 1 tablet (5 mg total) by mouth daily   b complex vitamins tablet  Self Yes No   Sig: Take 1 tablet by mouth daily    diphenhydrAMINE (BENADRYL) 25 mg tablet   No No   Sig: Take 1 tablet (25 mg total) by mouth every 6 (six) hours as needed (facial swelling)   fluticasone (FLONASE) 50 mcg/act nasal spray  Self No No   Si-2 sprays into each nostril daily   gabapentin (NEURONTIN) 100 mg capsule  Self Yes No   Sig: Take 100 mg by mouth daily   hydrALAZINE (APRESOLINE) 25 mg tablet   No No   Sig: TAKE 1 TABLET BY MOUTH TWICE A DAY   insulin aspart (NovoLOG) 100 units/mL injection  Self Yes No   Sig: Inject under the skin 3 (three) times a day before meals 14 units, 14 units, 18 units     insulin detemir (Levemir FlexTouch) 100 Units/mL injection pen  Self Yes No   Sig: Inject 50 Units under the skin every evening    levothyroxine 125 mcg tablet   No No   Sig: Take 1 tablet (125 mcg total) by mouth daily in the early morning   metoprolol tartrate (LOPRESSOR) 50 mg tablet  Self No No   Sig: Take 1 tablet (50 mg total) by mouth every 12 (twelve) hours   olmesartan (BENICAR) 40 mg tablet  Self No No   Sig: TAKE 1 TABLET BY MOUTH EVERY DAY   pantoprazole (PROTONIX) 40 mg tablet   No No   Sig: Take 1 tablet (40 mg total) by mouth daily in the early morning   pramipexole (MIRAPEX) 0 25 mg tablet  Self No No   Sig: Take 1 tablet (0 25 mg total) by mouth daily at bedtime   predniSONE 20 mg tablet   No No   Sig: Take 1 tablet (20 mg total) by mouth daily for 3 days   rosuvastatin (CRESTOR) 20 MG tablet   No No   Sig: Take 1 tablet (20 mg total) by mouth daily   saccharomyces boulardii (FLORASTOR) 250 mg capsule   No No   Sig: Take 1 capsule (250 mg total) by mouth 2 (two) times a day   spironolactone (ALDACTONE) 25 mg tablet   No No   Sig: Take 1 tablet (25 mg total) by mouth 3 (three) times a week   torsemide (DEMADEX) 20 mg tablet  Self No No   Sig: TAKE 1 TABLET BY MOUTH EVERY DAY      Facility-Administered Medications: None       Past Medical History:   Diagnosis Date    Allergic     Anesthesia     pt reports "had to use double lumen endo tube for hiatal hernia repair/so surgeon could get to where he needed to work"    Arthritis     Aspiration into airway     Basal cell carcinoma 2007    left cheek     BCC (basal cell carcinoma) 05/27/2021    Left Nasal tip    Cancer (HCC)     squamous cell cancer on forhead    Cancer ([facility] )     basal cell on nose     Chronic kidney disease 2000, 2018    Stones, kidney disease stage 4    Chronic pain disorder     bilat feet and joint pain on occas    Colon polyp     COVID-19 08/2021    recovered at home/did receive monoclonal infusion    CPAP (continuous positive airway pressure) dependence     not using as has been recalled    Dental bridge present     Depression     Diabetes mellitus ([place] [address] )     Type 2    Diabetic neuropathy (HCC)     Disease of thyroid gland     Family history of thyroid problem     Fatty liver     GERD (gastroesophageal reflux disease)     Heart burn     History of pneumonia     Hyperlipidemia     Hyperplasia, parathyroid ([place] [address] )     Hypertension     Kidney problem     Kidney stone     Memory loss Julu2 2020    Motion sickness     Obesity 1978    Obesity (BMI 30 0-34  9)     Pollen allergies     RLS (restless legs syndrome)     SCC (squamous cell carcinoma) 05/04/2021    left mid forehead    Seasonal allergies     Sleep apnea     uses CPAP    Squamous cell skin cancer 2007    left cheek     Swollen ankles     Urinary tract infection 3/28/22    Wears glasses        Past Surgical History:   Procedure Laterality Date    ARTHROSCOPY KNEE      BREAST BIOPSY      stereotactic-benign    BREAST BIOPSY      stereo-benign    BREAST EXCISIONAL BIOPSY      unknown date-benign    BREAST EXCISIONAL BIOPSY      unknown date-benign    BREAST EXCISIONAL BIOPSY      unknown date-benign    BREAST EXCISIONAL BIOPSY      unknown age-benign    CHOLECYSTECTOMY      COLONOSCOPY      EXAMINATION UNDER ANESTHESIA N/A 6/24/2021    Procedure: EXAM UNDER ANESTHESIA (EUA), DISE;  Surgeon: Britney Gallardo MD;  [facility];  Service: ENT    HERNIA REPAIR      HIATAL HERNIA REPAIR      KNEE SURGERY      Emy webb lap surg    LIPOSUCTION      LYMPH NODE BIOPSY      MOHS SURGERY  05/20/2021    left mid forehead-Evan CUETOS SURGERY Left 05/27/2021    Left nasal tip- gautam     MT INCISION IMPLANT CRANIAL NERVE STIM ELECTRODE/PULSE GEN N/A 11/10/2021    Procedure: INSERTION UPPER AIRWAY STIMULATOR, INSPIRE IMPLANT;  Surgeon: Yadira Cox MD;  Location: [facility];  Service: ENT    REDUCTION MAMMAPLASTY Bilateral 2000    REDUCTION MAMMAPLASTY      SKIN BIOPSY      SKIN CANCER EXCISION  2007    squamous cell carcinoma     SKIN CANCER EXCISION  2007    basal cell carcinoma    SQUAMOUS CELL CARCINOMA EXCISION      TOE SURGERY      TONSILLECTOMY      TUBAL LIGATION      UPPER GASTROINTESTINAL ENDOSCOPY         Family History   Problem Relation Age of Onset    Heart disease Mother     Depression Mother     Hypertension Mother     COPD Mother     Hearing loss Mother     Anxiety disorder Mother     Heart disease Father     Lung cancer Father 79        Smoker     Cancer Father         brain    Alcohol abuse Father     Dementia Father     Hypertension Father     Thyroid disease Father     COPD Father     Arthritis Father     Brain cancer Father 76    Hypertension Sister     Diabetes Sister     Heart disease Sister     Thyroid disease Sister     Cancer Sister         Lympoma    Lung cancer Sister 78    Anxiety disorder Sister     Hypertension Brother     Diabetes Brother     Cancer Brother         Throat    Dementia Brother     Stroke Brother     Hypertension Brother     No Known Problems Son     No Known Problems Son     Heart disease Brother     Diabetes Brother     Brain cancer Paternal Aunt         unknown age     I have reviewed and agree with the history as documented      E-Cigarette/Vaping    E-Cigarette Use Never User      E-Cigarette/Vaping Substances    Nicotine No     THC No     CBD No     Flavoring No     Other No     Unknown No      Social History     Tobacco Use    Smoking status: Former Smoker     Packs/day: 2 00     Years: 10 00     Pack years: 20 00     Types: Cigarettes Start date: 1/10/1968     Quit date:      Years since quittin 2    Smokeless tobacco: Former User     Quit date: 1976    Tobacco comment: Smoked 2 pack a day   Vaping Use    Vaping Use: Never used   Substance Use Topics    Alcohol use: Not Currently     Alcohol/week: 0 0 standard drinks     Comment: 1 or 2 a year    Drug use: No       Review of Systems   Constitutional: Negative  HENT: Negative  Eyes: Negative  Respiratory: Positive for shortness of breath  Negative for apnea, cough, choking, chest tightness, wheezing and stridor  Cardiovascular: Positive for chest pain  Negative for palpitations and leg swelling  Gastrointestinal: Negative  Endocrine: Negative  Genitourinary: Negative  Musculoskeletal: Negative  Skin: Negative  Allergic/Immunologic: Negative  Neurological: Negative  Hematological: Negative  Psychiatric/Behavioral: Negative  Physical Exam  Physical Exam  Vitals and nursing note reviewed  Constitutional:       General: She is not in acute distress  Appearance: She is well-developed  She is not ill-appearing, toxic-appearing or diaphoretic  Interventions: She is not intubated  Comments: avss-- pulse ox  96 % on ra- interpretation is normal- no intervention in nad   HENT:      Head: Normocephalic and atraumatic  Mouth/Throat:      Mouth: Mucous membranes are moist    Eyes:      Extraocular Movements: Extraocular movements intact  Pupils: Pupils are equal, round, and reactive to light  Comments: Mm pin k   Neck:      Thyroid: No thyromegaly  Vascular: No hepatojugular reflux or JVD  Trachea: No tracheal deviation  Comments: No pmt c/t/l/s spine   Cardiovascular:      Rate and Rhythm: Normal rate and regular rhythm  No extrasystoles are present  Pulses: Normal pulses  No decreased pulses  Heart sounds: Normal heart sounds  No murmur heard  No friction rub  No gallop      Pulmonary: Effort: Pulmonary effort is normal  No tachypnea, bradypnea, accessory muscle usage or respiratory distress  She is not intubated  Breath sounds: Normal breath sounds  No stridor  No decreased breath sounds, wheezing, rhonchi or rales  Chest:      Chest wall: No mass, deformity, tenderness, crepitus or edema  There is no dullness to percussion  Abdominal:      General: Bowel sounds are normal       Palpations: Abdomen is soft  There is no hepatomegaly, splenomegaly or mass  Tenderness: There is no abdominal tenderness  There is no guarding or rebound  Comments: Soft nt nd- no hsm- no cva tenderness- no ascites/ no peritoneal signs- no pulsatile abd mass/bruit/ tenderness   Musculoskeletal:         General: Normal range of motion  Cervical back: Normal range of motion and neck supple  Right lower leg: No tenderness  Edema present  Left lower leg: No tenderness  Edema present  Comments: Trace ble pretibial edema- nt- no asym/ erythema- equal bilateral radial/dp pulses   Lymphadenopathy:      Cervical: No cervical adenopathy  Skin:     General: Skin is warm  Capillary Refill: Capillary refill takes less than 2 seconds  Coloration: Skin is pale  Skin is not cyanotic  Findings: No ecchymosis, erythema or rash  Nails: There is no clubbing  Neurological:      General: No focal deficit present  Mental Status: She is alert and oriented to person, place, and time  Cranial Nerves: No cranial nerve deficit  Motor: No weakness  Comments: Normal non focal neuro exam    Psychiatric:         Mood and Affect: Mood normal  Mood is not anxious  Behavior: Behavior normal  Behavior is not agitated           Vital Signs  ED Triage Vitals   Temperature Pulse Respirations Blood Pressure SpO2   04/05/22 1600 04/05/22 1557 04/05/22 1557 04/05/22 1557 04/05/22 1557   98 5 °F (36 9 °C) 103 18 145/80 96 %      Temp src Heart Rate Source Patient Position - Orthostatic VS BP Location FiO2 (%)   -- 04/05/22 1557 04/05/22 1557 04/05/22 1557 --    Monitor Sitting Left arm       Pain Score       04/05/22 1557       No Pain           Vitals:    04/05/22 1557   BP: 145/80   Pulse: 103   Patient Position - Orthostatic VS: Sitting         Visual Acuity      ED Medications  Medications - No data to display    Diagnostic Studies  Results Reviewed     Procedure Component Value Units Date/Time    HS Troponin I 2hr [[id number]]  (Normal) Collected: 04/05/22 1907    Lab Status: Final result Specimen: Blood from Arm, Left Updated: 04/05/22 1937     hs TnI 2hr 6 ng/L      Delta 2hr hsTnI 0 ng/L     Urine Microscopic [[id number]]  (Normal) Collected: 04/05/22 1704    Lab Status: Final result Specimen: Urine, Clean Catch Updated: 04/05/22 1757     RBC, UA None Seen /hpf      WBC, UA 0-1 /hpf      Epithelial Cells Occasional /hpf      Bacteria, UA None Seen /hpf     HS Troponin 0hr (reflex protocol) [[id number]]  (Normal) Collected: 04/05/22 1650    Lab Status: Final result Specimen: Blood from Arm, Left Updated: 04/05/22 1723     hs TnI 0hr 6 ng/L     UA (URINE) with reflex to Scope [[id number]]  (Abnormal) Collected: 04/05/22 1704    Lab Status: Final result Specimen: Urine, Clean Catch Updated: 04/05/22 1714     Color, UA Yellow     Clarity, UA Clear     Specific Gravity, UA <=1 005     pH, UA 6 0     Leukocytes, UA Trace     Nitrite, UA Negative     Protein, UA Negative mg/dl      Glucose, UA Negative mg/dl      Ketones, UA Negative mg/dl      Urobilinogen, UA 0 2 E U /dl      Bilirubin, UA Negative     Blood, UA Negative    D-dimer, quantitative [[id number]]  (Normal) Collected: 04/05/22 1650    Lab Status: Final result Specimen: Blood from Arm, Left Updated: 04/05/22 1711     D-Dimer, Quant 0 27 ug/ml FEU     Narrative:       In the evaluation for possible pulmonary embolism, in the appropriate (Well's Score of 4 or less) patient, the age adjusted d-dimer cutoff for this patient can be calculated as:    Age x 0 01 (in ug/mL) for Age-adjusted D-dimer exclusion threshold for a patient over 50 years  CBC and differential [[id number]]  (Abnormal) Collected: 04/05/22 1650    Lab Status: Final result Specimen: Blood from Arm, Left Updated: 04/05/22 1657     WBC 19 50 Thousand/uL      RBC 4 08 Million/uL      Hemoglobin 12 2 g/dL      Hematocrit 36 2 %      MCV 89 fL      MCH 29 9 pg      MCHC 33 7 g/dL      RDW 14 6 %      MPV 11 0 fL      Platelets 489 Thousands/uL      nRBC 0 /100 WBCs      Neutrophils Relative 89 %      Immat GRANS % 1 %      Lymphocytes Relative 7 %      Monocytes Relative 3 %      Eosinophils Relative 0 %      Basophils Relative 0 %      Neutrophils Absolute 17 33 Thousands/µL      Immature Grans Absolute 0 23 Thousand/uL      Lymphocytes Absolute 1 36 Thousands/µL      Monocytes Absolute 0 56 Thousand/µL      Eosinophils Absolute 0 00 Thousand/µL      Basophils Absolute 0 02 Thousands/µL                  XR chest 1 view portable   Final Result by Adryan Singh MD (04/06 6076)      No active pulmonary disease                    Workstation performed: [id number]                    Procedures  Procedures         ED Course  ED Course as of 04/10/22 1955   Tue Apr 05, 2022   1731 - er md note=- pt is on prednisone--   --    1731 Er md note- pt had outpt cmp/ tsh today - results reviewed   1731 Er md medical decision making note- pt is low risk for pe by well' score- score of 1 5- er md clinical suspicion is low-  pt fails perc by age and mild baseline tachycardia- will check d dimer and use peg ed study d dimer cutoff of 1000 for low risk pt    1734 Er md note- recent admit and er chart reviewed-     1735 Er md note-  9/20/21 nm stress test results and 5/3/20  cardiac echo report re viewed by er md   1730 Cxr portable- compared to previous 11/21-  no sign changes- r sided chadd device-- no change in mediastinum/cardiac silhouette- no free/sq air - no infiltrate/ ptx/ pulm edema/ pleural effusions   1801 Er md note- pt aware of neg workup so far and repeat trop /ecg    2026 Er md not-e after h and p and testing er md clinical suspicion of any serious cause of chest pain- avcs/scad/ vte/ tad is low    2103 - er md note- discussed with both patient and  elevated d dimer --  with remote hx of severe  iv contrast reaction - one option would be to admit as obs for v/q scan to rule out pe-- pt is agreeable  -                                              MDM    Disposition  Final diagnoses:   SOB (shortness of breath)   Chest pain, unspecified type     Time reflects when diagnosis was documented in both MDM as applicable and the Disposition within this note     Time User Action Codes Description Comment    4/5/2022  8:24 PM Samantha Needy D Add [R06 02] SOB (shortness of breath)     4/5/2022  8:24 PM Alpha Hail Add [R07 9] Chest pain, unspecified type       ED Disposition     ED Disposition Condition Date/Time Comment    Discharge Stable Tue Apr 5, 2022  8:24 PM [facility] discharge to home/self care              Follow-up Information    None         Discharge Medication List as of 4/5/2022  8:26 PM      CONTINUE these medications which have NOT CHANGED    Details   acetaminophen (TYLENOL) 500 mg tablet Take 1,000 mg by mouth as needed , Historical Med      albuterol (Ventolin HFA) 90 mcg/act inhaler Inhale 2 puffs every 6 (six) hours as needed for wheezing or shortness of breath, Starting Tue 5/5/2020, No Print      amLODIPine (NORVASC) 5 mg tablet Take 1 tablet (5 mg total) by mouth daily, Starting Wed 3/23/2022, Normal      b complex vitamins tablet Take 1 tablet by mouth daily , Historical Med      B-D ULTRAFINE III SHORT PEN 31G X 8 MM MISC USE AS DIRECTED 4 TIMES PER DAY, Historical Med      diphenhydrAMINE (BENADRYL) 25 mg tablet Take 1 tablet (25 mg total) by mouth every 6 (six) hours as needed (facial swelling), Starting Sun 4/3/2022, Normal      DULoxetine HCl (CYMBALTA PO) Take 40 mg by mouth 2 (two) times a day, Historical Med      fluticasone (FLONASE) 50 mcg/act nasal spray 1-2 sprays into each nostril daily, Starting Thu 1/13/2022, Normal      gabapentin (NEURONTIN) 100 mg capsule Take 100 mg by mouth daily, Starting Tue 12/14/2021, Historical Med      hydrALAZINE (APRESOLINE) 25 mg tablet TAKE 1 TABLET BY MOUTH TWICE A DAY, Normal      Icosapent Ethyl (Vascepa) 1 g CAPS Take 1 g by mouth 2 (two) times a day, Historical Med      insulin aspart (NovoLOG) 100 units/mL injection Inject under the skin 3 (three) times a day before meals 14 units, 14 units, 18 units  , Historical Med      insulin detemir (Levemir FlexTouch) 100 Units/mL injection pen Inject 50 Units under the skin every evening , Starting Tue 6/8/2021, Historical Med      levothyroxine 125 mcg tablet Take 1 tablet (125 mcg total) by mouth daily in the early morning, Starting Sat 4/2/2022, Until Mon 5/2/2022, Normal      metoprolol tartrate (LOPRESSOR) 50 mg tablet Take 1 tablet (50 mg total) by mouth every 12 (twelve) hours, Starting Mon 4/26/2021, Normal      Multiple Vitamin (multivitamin) capsule Take 1 capsule by mouth daily, Historical Med      olmesartan (BENICAR) 40 mg tablet TAKE 1 TABLET BY MOUTH EVERY DAY, Normal      pantoprazole (PROTONIX) 40 mg tablet Take 1 tablet (40 mg total) by mouth daily in the early morning, Starting Sat 4/2/2022, Until Mon 5/2/2022, Normal      pramipexole (MIRAPEX) 0 25 mg tablet Take 1 tablet (0 25 mg total) by mouth daily at bedtime, Starting Wed 12/22/2021, Normal      predniSONE 20 mg tablet Take 1 tablet (20 mg total) by mouth daily for 3 days, Starting Sun 4/3/2022, Until Wed 4/6/2022, Normal      rosuvastatin (CRESTOR) 20 MG tablet Take 1 tablet (20 mg total) by mouth daily, Starting Fri 4/1/2022, Until Sun 5/1/2022, Normal      saccharomyces boulardii (FLORASTOR) 250 mg capsule Take 1 capsule (250 mg total) by mouth 2 (two) times a day, Starting Fri 4/1/2022, Until Sun 5/1/2022, Normal      spironolactone (ALDACTONE) 25 mg tablet Take 1 tablet (25 mg total) by mouth 3 (three) times a week, Starting Wed 2/16/2022, Normal      torsemide (DEMADEX) 20 mg tablet TAKE 1 TABLET BY MOUTH EVERY DAY, Normal      Vitamin D, Ergocalciferol, 2000 units CAPS Take 2,000 Units by mouth daily , Historical Med             No discharge procedures on file      PDMP Review       Value Time User    PDMP Reviewed  Yes 11/10/2021  3:47 PM Mark Vargas MD          ED Provider  Electronically Signed by           Laron Huston MD  04/10/22 2002

## 2022-04-13 ENCOUNTER — HOSPITAL ENCOUNTER (INPATIENT)
Facility: HOSPITAL | Age: 72
LOS: 1 days | Discharge: HOME | DRG: 315 | End: 2022-04-14
Attending: EMERGENCY MEDICINE | Admitting: INTERNAL MEDICINE
Payer: MEDICARE

## 2022-04-13 ENCOUNTER — OFFICE VISIT (OUTPATIENT)
Dept: SLEEP CENTER | Facility: CLINIC | Age: 72
End: 2022-04-13
Payer: MEDICARE

## 2022-04-13 ENCOUNTER — APPOINTMENT (EMERGENCY)
Dept: RADIOLOGY | Facility: HOSPITAL | Age: 72
DRG: 315 | End: 2022-04-13
Payer: MEDICARE

## 2022-04-13 ENCOUNTER — TELEPHONE (OUTPATIENT)
Dept: NEPHROLOGY | Facility: CLINIC | Age: 72
End: 2022-04-13

## 2022-04-13 VITALS
SYSTOLIC BLOOD PRESSURE: 93 MMHG | BODY MASS INDEX: 29.23 KG/M2 | WEIGHT: 165 LBS | HEART RATE: 57 BPM | DIASTOLIC BLOOD PRESSURE: 55 MMHG | HEIGHT: 63 IN | OXYGEN SATURATION: 96 %

## 2022-04-13 DIAGNOSIS — I95.2 HYPOTENSION DUE TO DRUGS: ICD-10-CM

## 2022-04-13 DIAGNOSIS — N39.0 UTI (URINARY TRACT INFECTION): Primary | ICD-10-CM

## 2022-04-13 DIAGNOSIS — J44.9 CHRONIC OBSTRUCTIVE PULMONARY DISEASE, UNSPECIFIED COPD TYPE (HCC): Chronic | ICD-10-CM

## 2022-04-13 DIAGNOSIS — N17.9 AKI (ACUTE KIDNEY INJURY) (HCC): ICD-10-CM

## 2022-04-13 DIAGNOSIS — I95.9 HYPOTENSION: ICD-10-CM

## 2022-04-13 DIAGNOSIS — N18.30 CKD (CHRONIC KIDNEY DISEASE) STAGE 3, GFR 30-59 ML/MIN (HCC): ICD-10-CM

## 2022-04-13 DIAGNOSIS — G47.33 OSA (OBSTRUCTIVE SLEEP APNEA): Primary | ICD-10-CM

## 2022-04-13 DIAGNOSIS — R00.1 BRADYCARDIA: ICD-10-CM

## 2022-04-13 DIAGNOSIS — I12.9 HYPERTENSIVE CHRONIC KIDNEY DISEASE WITH STAGE 1 THROUGH STAGE 4 CHRONIC KIDNEY DISEASE, OR UNSPECIFIED CHRONIC KIDNEY DISEASE: ICD-10-CM

## 2022-04-13 PROBLEM — R33.9 URINARY RETENTION: Status: ACTIVE | Noted: 2022-04-13

## 2022-04-13 LAB
ALBUMIN SERPL BCP-MCNC: 3.8 G/DL (ref 3.5–5)
ALP SERPL-CCNC: 91 U/L (ref 46–116)
ALT SERPL W P-5'-P-CCNC: 53 U/L (ref 12–78)
ANION GAP SERPL CALCULATED.3IONS-SCNC: 14 MMOL/L (ref 4–13)
AST SERPL W P-5'-P-CCNC: 27 U/L (ref 5–45)
BACTERIA UR QL AUTO: ABNORMAL /HPF
BACTERIA UR QL AUTO: ABNORMAL /HPF
BASOPHILS # BLD AUTO: 0.04 THOUSANDS/ΜL (ref 0–0.1)
BASOPHILS NFR BLD AUTO: 0 % (ref 0–1)
BILIRUB SERPL-MCNC: 0.31 MG/DL (ref 0.2–1)
BILIRUB UR QL STRIP: NEGATIVE
BUN SERPL-MCNC: 77 MG/DL (ref 5–25)
CALCIUM SERPL-MCNC: 9.2 MG/DL (ref 8.3–10.1)
CARDIAC TROPONIN I PNL SERPL HS: <2 NG/L
CHLORIDE SERPL-SCNC: 99 MMOL/L (ref 100–108)
CLARITY UR: ABNORMAL
CLARITY UR: CLEAR
CLARITY UR: CLEAR
CO2 SERPL-SCNC: 24 MMOL/L (ref 21–32)
COLOR UR: YELLOW
CREAT SERPL-MCNC: 2.27 MG/DL (ref 0.6–1.3)
EOSINOPHIL # BLD AUTO: 0.18 THOUSAND/ΜL (ref 0–0.61)
EOSINOPHIL NFR BLD AUTO: 2 % (ref 0–6)
ERYTHROCYTE [DISTWIDTH] IN BLOOD BY AUTOMATED COUNT: 14 % (ref 11.6–15.1)
GFR SERPL CREATININE-BSD FRML MDRD: 20 ML/MIN/1.73SQ M
GLUCOSE SERPL-MCNC: 101 MG/DL (ref 65–140)
GLUCOSE SERPL-MCNC: 102 MG/DL (ref 65–140)
GLUCOSE SERPL-MCNC: 106 MG/DL (ref 65–140)
GLUCOSE SERPL-MCNC: 213 MG/DL (ref 65–140)
GLUCOSE UR STRIP-MCNC: NEGATIVE MG/DL
HCT VFR BLD AUTO: 38.2 % (ref 34.8–46.1)
HGB BLD-MCNC: 13.3 G/DL (ref 11.5–15.4)
HGB UR QL STRIP.AUTO: NEGATIVE
IMM GRANULOCYTES # BLD AUTO: 0.07 THOUSAND/UL (ref 0–0.2)
IMM GRANULOCYTES NFR BLD AUTO: 1 % (ref 0–2)
KETONES UR STRIP-MCNC: NEGATIVE MG/DL
LACTATE SERPL-SCNC: 0.6 MMOL/L (ref 0.5–2)
LEUKOCYTE ESTERASE UR QL STRIP: ABNORMAL
LYMPHOCYTES # BLD AUTO: 3.38 THOUSANDS/ΜL (ref 0.6–4.47)
LYMPHOCYTES NFR BLD AUTO: 29 % (ref 14–44)
MAGNESIUM SERPL-MCNC: 2.7 MG/DL (ref 1.6–2.6)
MCH RBC QN AUTO: 30.3 PG (ref 26.8–34.3)
MCHC RBC AUTO-ENTMCNC: 34.8 G/DL (ref 31.4–37.4)
MCV RBC AUTO: 87 FL (ref 82–98)
MONOCYTES # BLD AUTO: 1.11 THOUSAND/ΜL (ref 0.17–1.22)
MONOCYTES NFR BLD AUTO: 9 % (ref 4–12)
NEUTROPHILS # BLD AUTO: 7.09 THOUSANDS/ΜL (ref 1.85–7.62)
NEUTS SEG NFR BLD AUTO: 59 % (ref 43–75)
NITRITE UR QL STRIP: NEGATIVE
NITRITE UR QL STRIP: NEGATIVE
NITRITE UR QL STRIP: POSITIVE
NON-SQ EPI CELLS URNS QL MICRO: ABNORMAL /HPF
NON-SQ EPI CELLS URNS QL MICRO: ABNORMAL /HPF
NRBC BLD AUTO-RTO: 0 /100 WBCS
OTHER STN SPEC: ABNORMAL
PH UR STRIP.AUTO: 6 [PH]
PH UR STRIP.AUTO: 6 [PH]
PH UR STRIP.AUTO: 6 [PH] (ref 4.5–8)
PLATELET # BLD AUTO: 318 THOUSANDS/UL (ref 149–390)
PMV BLD AUTO: 11.4 FL (ref 8.9–12.7)
POTASSIUM SERPL-SCNC: 4.8 MMOL/L (ref 3.5–5.3)
PROT SERPL-MCNC: 7.4 G/DL (ref 6.4–8.2)
PROT UR STRIP-MCNC: NEGATIVE MG/DL
RBC # BLD AUTO: 4.39 MILLION/UL (ref 3.81–5.12)
RBC #/AREA URNS AUTO: ABNORMAL /HPF
RBC #/AREA URNS AUTO: ABNORMAL /HPF
SODIUM SERPL-SCNC: 137 MMOL/L (ref 136–145)
SP GR UR STRIP.AUTO: 1.01 (ref 1–1.03)
SP GR UR STRIP.AUTO: 1.01 (ref 1–1.03)
SP GR UR STRIP.AUTO: <=1.005 (ref 1–1.03)
UROBILINOGEN UR QL STRIP.AUTO: 0.2 E.U./DL
WBC # BLD AUTO: 11.87 THOUSAND/UL (ref 4.31–10.16)
WBC #/AREA URNS AUTO: ABNORMAL /HPF
WBC #/AREA URNS AUTO: ABNORMAL /HPF

## 2022-04-13 PROCEDURE — 87086 URINE CULTURE/COLONY COUNT: CPT

## 2022-04-13 PROCEDURE — 87077 CULTURE AEROBIC IDENTIFY: CPT

## 2022-04-13 PROCEDURE — 96365 THER/PROPH/DIAG IV INF INIT: CPT

## 2022-04-13 PROCEDURE — 36415 COLL VENOUS BLD VENIPUNCTURE: CPT

## 2022-04-13 PROCEDURE — 87186 SC STD MICRODIL/AGAR DIL: CPT

## 2022-04-13 PROCEDURE — 80053 COMPREHEN METABOLIC PANEL: CPT

## 2022-04-13 PROCEDURE — 93005 ELECTROCARDIOGRAM TRACING: CPT

## 2022-04-13 PROCEDURE — 99213 OFFICE O/P EST LOW 20 MIN: CPT | Performed by: INTERNAL MEDICINE

## 2022-04-13 PROCEDURE — 83735 ASSAY OF MAGNESIUM: CPT

## 2022-04-13 PROCEDURE — 82948 REAGENT STRIP/BLOOD GLUCOSE: CPT

## 2022-04-13 PROCEDURE — 81001 URINALYSIS AUTO W/SCOPE: CPT

## 2022-04-13 PROCEDURE — 96375 TX/PRO/DX INJ NEW DRUG ADDON: CPT

## 2022-04-13 PROCEDURE — 81001 URINALYSIS AUTO W/SCOPE: CPT | Performed by: INTERNAL MEDICINE

## 2022-04-13 PROCEDURE — 84484 ASSAY OF TROPONIN QUANT: CPT

## 2022-04-13 PROCEDURE — 99291 CRITICAL CARE FIRST HOUR: CPT | Performed by: EMERGENCY MEDICINE

## 2022-04-13 PROCEDURE — 71045 X-RAY EXAM CHEST 1 VIEW: CPT

## 2022-04-13 PROCEDURE — 99223 1ST HOSP IP/OBS HIGH 75: CPT | Performed by: INTERNAL MEDICINE

## 2022-04-13 PROCEDURE — 99285 EMERGENCY DEPT VISIT HI MDM: CPT

## 2022-04-13 PROCEDURE — 83605 ASSAY OF LACTIC ACID: CPT

## 2022-04-13 PROCEDURE — 85025 COMPLETE CBC W/AUTO DIFF WBC: CPT

## 2022-04-13 RX ORDER — GABAPENTIN 100 MG/1
100 CAPSULE ORAL DAILY
Status: DISCONTINUED | OUTPATIENT
Start: 2022-04-14 | End: 2022-04-13

## 2022-04-13 RX ORDER — DULOXETIN HYDROCHLORIDE 20 MG/1
40 CAPSULE, DELAYED RELEASE ORAL DAILY
Status: DISCONTINUED | OUTPATIENT
Start: 2022-04-14 | End: 2022-04-14 | Stop reason: HOSPADM

## 2022-04-13 RX ORDER — METOPROLOL TARTRATE 50 MG/1
50 TABLET, FILM COATED ORAL EVERY 12 HOURS SCHEDULED
Status: DISCONTINUED | OUTPATIENT
Start: 2022-04-13 | End: 2022-04-14 | Stop reason: HOSPADM

## 2022-04-13 RX ORDER — LEVOTHYROXINE SODIUM 0.12 MG/1
125 TABLET ORAL
Status: DISCONTINUED | OUTPATIENT
Start: 2022-04-14 | End: 2022-04-14

## 2022-04-13 RX ORDER — MELATONIN
2000 DAILY
Status: DISCONTINUED | OUTPATIENT
Start: 2022-04-14 | End: 2022-04-14 | Stop reason: HOSPADM

## 2022-04-13 RX ORDER — CHLORAL HYDRATE 500 MG
1000 CAPSULE ORAL 2 TIMES DAILY
Status: DISCONTINUED | OUTPATIENT
Start: 2022-04-13 | End: 2022-04-14 | Stop reason: HOSPADM

## 2022-04-13 RX ORDER — ICOSAPENT ETHYL 1000 MG/1
1 CAPSULE ORAL 2 TIMES DAILY
Status: DISCONTINUED | OUTPATIENT
Start: 2022-04-13 | End: 2022-04-13

## 2022-04-13 RX ORDER — PANTOPRAZOLE SODIUM 40 MG/1
40 TABLET, DELAYED RELEASE ORAL
Status: DISCONTINUED | OUTPATIENT
Start: 2022-04-14 | End: 2022-04-14 | Stop reason: HOSPADM

## 2022-04-13 RX ORDER — GABAPENTIN 100 MG/1
100 CAPSULE ORAL
Status: DISCONTINUED | OUTPATIENT
Start: 2022-04-13 | End: 2022-04-14 | Stop reason: HOSPADM

## 2022-04-13 RX ORDER — ATORVASTATIN CALCIUM 40 MG/1
40 TABLET, FILM COATED ORAL
Status: DISCONTINUED | OUTPATIENT
Start: 2022-04-13 | End: 2022-04-14 | Stop reason: HOSPADM

## 2022-04-13 RX ORDER — DULOXETIN HYDROCHLORIDE 20 MG/1
40 CAPSULE, DELAYED RELEASE ORAL 2 TIMES DAILY
Status: DISCONTINUED | OUTPATIENT
Start: 2022-04-13 | End: 2022-04-13

## 2022-04-13 RX ORDER — PRAMIPEXOLE DIHYDROCHLORIDE 0.25 MG/1
0.25 TABLET ORAL
Status: DISCONTINUED | OUTPATIENT
Start: 2022-04-13 | End: 2022-04-14 | Stop reason: HOSPADM

## 2022-04-13 RX ORDER — ERGOCALCIFEROL (VITAMIN D2) 200 MCG/ML
2000 DROPS ORAL DAILY
Status: DISCONTINUED | OUTPATIENT
Start: 2022-04-14 | End: 2022-04-13

## 2022-04-13 RX ORDER — DIPHENHYDRAMINE HCL 25 MG
25 TABLET ORAL EVERY 6 HOURS PRN
Status: DISCONTINUED | OUTPATIENT
Start: 2022-04-13 | End: 2022-04-14 | Stop reason: HOSPADM

## 2022-04-13 RX ORDER — LEVOTHYROXINE SODIUM 112 UG/1
112 TABLET ORAL EVERY MORNING
Status: DISCONTINUED | OUTPATIENT
Start: 2022-04-14 | End: 2022-04-13

## 2022-04-13 RX ORDER — LEVOTHYROXINE SODIUM 0.12 MG/1
125 TABLET ORAL
Status: DISCONTINUED | OUTPATIENT
Start: 2022-04-14 | End: 2022-04-13

## 2022-04-13 RX ORDER — ACETAMINOPHEN 325 MG/1
650 TABLET ORAL EVERY 6 HOURS PRN
Status: DISCONTINUED | OUTPATIENT
Start: 2022-04-13 | End: 2022-04-14 | Stop reason: HOSPADM

## 2022-04-13 RX ADMIN — ATORVASTATIN CALCIUM 40 MG: 40 TABLET, FILM COATED ORAL at 18:31

## 2022-04-13 RX ADMIN — METOPROLOL TARTRATE 50 MG: 50 TABLET, FILM COATED ORAL at 21:55

## 2022-04-13 RX ADMIN — CEFTRIAXONE SODIUM 1000 MG: 10 INJECTION, POWDER, FOR SOLUTION INTRAVENOUS at 15:12

## 2022-04-13 RX ADMIN — ACETAMINOPHEN 650 MG: 325 TABLET ORAL at 21:56

## 2022-04-13 RX ADMIN — SODIUM CHLORIDE, SODIUM LACTATE, POTASSIUM CHLORIDE, AND CALCIUM CHLORIDE 500 ML: .6; .31; .03; .02 INJECTION, SOLUTION INTRAVENOUS at 14:34

## 2022-04-13 RX ADMIN — PRAMIPEXOLE DIHYDROCHLORIDE 0.25 MG: 0.25 TABLET ORAL at 21:56

## 2022-04-13 RX ADMIN — SODIUM CHLORIDE, SODIUM LACTATE, POTASSIUM CHLORIDE, AND CALCIUM CHLORIDE 500 ML: .6; .31; .03; .02 INJECTION, SOLUTION INTRAVENOUS at 13:45

## 2022-04-13 RX ADMIN — INSULIN LISPRO 2 UNITS: 100 INJECTION, SOLUTION INTRAVENOUS; SUBCUTANEOUS at 21:55

## 2022-04-13 RX ADMIN — INSULIN DETEMIR 20 UNITS: 100 INJECTION, SOLUTION SUBCUTANEOUS at 18:32

## 2022-04-13 RX ADMIN — OMEGA-3 FATTY ACIDS CAP 1000 MG 1000 MG: 1000 CAP at 18:32

## 2022-04-13 RX ADMIN — GABAPENTIN 100 MG: 100 CAPSULE ORAL at 21:56

## 2022-04-13 NOTE — TELEPHONE ENCOUNTER
----- Message from Haile Ocampo MD sent at 4/13/2022 10:59 AM EDT -----  Labs are stable  Recommend:  1  Low-potassium diet  2   Nonfasting basic metabolic profile in approximately 4-6 weeks to demonstrate stability

## 2022-04-13 NOTE — LETTER
April 13, 2022     MD [address]  [address]  [facility]    Patient: Lazara Graham   YOB: 1950   Date of Visit: 4/13/2022       Dear Dr Don Hill: Thank you for referring Mariza Singh to me for evaluation  Below are my notes for this consultation  If you have questions, please do not hesitate to call me  I look forward to following your patient along with you  Sincerely,        Sukumar Dawson DO        CC: No Recipients  Sukumar Dawson DO  4/13/2022  1:10 PM  Sign when Signing Visit  Progress Note - Sleep Medicine  Lazara Graham 66 y o  female MRN: [id number]       Impression & Plan:   71 y o  F with PMHx of DM, HTN, Hyperlipidemia and hiatal hernia s/p repair who comes in for follow up with recent respiratory failure requiring mechanical ventilation due to aspiration, BARBARA s/p UPPP on CPAP who comes in for Inspire initiation    1  Hypotension and Bradycardia - normal /95,  Now 93/55  She is pale, notes significant fatigue as well  While she did have new BP medication started by Dr Giselle Shelton, she does appear acutely ill at this point with palor and fatigue  Therefore, I recommend that she goes to the ER  -  I will place an ADT21 order to help facilitate the evaluation  2   Moderate BARBARA (AHI - 19 5) who previously failed autoCPAP s/p implantation of Inspire on 11/10/21  Here for device activation       -  Device activation was activated at last visit  She underwent her sleep study on 3/24/22  She states that she is still struggling with use  We will reschedule her to discuss and evaluate it further when her BP is better mangaed  -  Will set up for follow up in 2 week          -  I discussed in depth the risk of leaving sleep apnea untreated including hypertension, heart failure, arrhythmia, MI and stroke      3   RLS and significant neuropathy -  she states that this this is stable          -  Continue Neurontin 100mg PO at bedtime          -  Continue Mirapex 0 25 qHS   I renewed the dose today      4   Mild pulmonary hypertension/RV hypertrophy on echo -  follow echo after optimizing treatment     ______________________________________________________________________    HPI:    Taya Johnson presents today for follow-up for BARBARA  However, she came in and feels run down, weak and significant fatigue  She also mentions some lightheadedness and dizziness            Review of Systems:  Review of Systems  Genitourinary need to urinate more than twice a night   Cardiology none   Gastrointestinal none   Neurology none   Constitutional weight change   Integumentary none   Psychiatry none   Musculoskeletal joint pain, back pain and legs twitching/jerking   Pulmonary none   ENT throat clearing   Endocrine frequent urination   Hematological none         Social history updates:  Social History     Tobacco Use   Smoking Status Former Smoker    Packs/day: 2 00    Years: 10 00    Pack years: 20 00    Types: Cigarettes    Start date: 1/10/1968   Tatiana Romero Quit date: 12    Years since quittin 4   Smokeless Tobacco Former User    Quit date: 1976   Tobacco Comment    Smoked 2 pack a day     Social History     Socioeconomic History    Marital status: /Civil Union     Spouse name: Not on file    Number of children: Not on file    Years of education: Not on file    Highest education level: Not on file   Occupational History    Occupation: RETIRED   Tobacco Use    Smoking status: Former Smoker     Packs/day: 2 00     Years: 10 00     Pack years: 20 00     Types: Cigarettes     Start date: 1/10/1968     Quit date:      Years since quittin 2    Smokeless tobacco: Former User     Quit date: 1976    Tobacco comment: Smoked 2 pack a day   Vaping Use    Vaping Use: Never used   Substance and Sexual Activity    Alcohol use: Not Currently     Alcohol/week: 0 0 standard drinks     Comment: 1 or 2 a year  Drug use: No    Sexual activity: Not Currently     Partners: Male     Birth control/protection: Post-menopausal, Female Sterilization     Comment: defer   Other Topics Concern    Not on file   Social History Narrative    Not on file     Social Determinants of Health     Financial Resource Strain: Not on file   Food Insecurity: Not on file   Transportation Needs: Not on file   Physical Activity: Not on file   Stress: Not on file   Social Connections: Not on file   Intimate Partner Violence: Not on file   Housing Stability: Not on file       PhysicalExamination:  Vitals:   BP 93/55 (BP Location: Left arm, Patient Position: Sitting, Cuff Size: Adult)   Pulse 57   Ht 5' 3" (1 6 m)   Wt 74 8 kg (165 lb)   SpO2 96%   BMI 29 23 kg/m²   General: Pale, ill appearing, Awake alert and oriented x 3, conversant without conversational dyspnea, NAD, normal affect  HEENT:  PERRL, Sclera noninjected, nonicteric OU, Nares patent,  no craniofacial abnormalities, Mucous membranes, moist, no oral lesions, normal dentition, Mallampati class 3  NECK: Trachea midline, no accessory muscle use, no stridor, no cervical or supraclavicular adenopathy, JVP not elevated  CARDIAC: Reg, single s1/S2, no m/r/g  PULM: CTA bilaterally no wheezing, rhonchi or rales  ABD: Normoactive bowel sounds, soft nontender, nondistended, no rebound, no rigidity, no guarding  EXT: No cyanosis, no clubbing, no edema, normal capillary refill  NEURO: no focal neurologic deficits, AAOx3, moving all extremities appropriately      Diagnostic Data:  Labs: I personally reviewed the most recent laboratory data pertinent to today's visit  not applicable    I have personally reviewed pertinent lab results    Lab Results   Component Value Date    WBC 19 50 (H) 04/05/2022    HGB 12 2 04/05/2022    HCT 36 2 04/05/2022    MCV 89 04/05/2022     04/05/2022     Lab Results   Component Value Date    GLUCOSE 168 (H) 11/30/2018    CALCIUM 9 2 04/03/2022    K 4 0 04/03/2022    CO2 27 04/03/2022    CL 99 (L) 04/03/2022    BUN 22 04/03/2022    CREATININE 2 27 (H) 04/03/2022     No results found for: IGE  Lab Results   Component Value Date    ALT 84 (H) 04/03/2022    AST 50 (H) 04/03/2022    ALKPHOS 90 04/03/2022        PFTs:  The most recent pulmonary function tests were reviewed  PFT with post bronchodilator 10/30/20  Study Interpretation:   · Reduced total lung capacity at 77% predicted with normal residual volume  · Normal airflow on vital capacity  · No significant improvement in airflow or vital capacity following the administration of bronchodilators  · Reduced diffusion capacity  · Normal airway resistance as indicated by the specific airway conductance      [place] in office 8/2/19  Prebronchodilator  FVC - 2 29 (79%)  FEV1 - 2 20 (83%)  FEV1/FVC - 76%     Post bronchodilator  FVC - 2 37 (82%)  FEV1 - 1 86 (84%)  FEV1/FVC -  78%  Very mild restriction      6 minute walk 8/2/19  Starting saturation - 94%   Starting HR - 74 bpm  Lowest saturation - 90%    Highest HR - 94 bpm  Ambulated - 252 m without the need for oxygen     Imaging  I personally reviewed the images on the [facility] system pertinent to today's visit  CT chest - 10/30/20  IMPRESSION:  Mild scarring/atelectasis in the lower lobes as mentioned above   No significant interstitial thickening or honeycombing or fibrotic changes are seen  No evidence of aspiration at this time  Hiatal hernia     Cardiac:  SUMMARY  LEFT VENTRICLE:  Systolic function was normal  Ejection fraction was estimated in the range of 55 %  Although no diagnostic regional wall motion abnormality was identified, this possibility cannot be completely excluded on the basis of this study  LEFT ATRIUM:  The atrium was dilated  MITRAL VALVE:  There was trace regurgitation      Sleep studies:  Diagnostic: 2011 -  AHI 13 1, severe hypoxia (sat 66%)     CPAP 5/8/19  Mrs Fuentes underwent a titration of CPAP to 10 cc of H2O pressure   She did well at 9 cc of H2O pressure in REM when in  the nonsupine position  However, she did not sleep much in the supine position  Therefore, I recommend a trial of autotitrating  CPAP 9 -20 cc of H2O pressure with Cflex 3 and heated humidification using a small Resmed Airfit F20  Compliance should be evaluated in 1-2 months  In addition, she had an increased number of limb movements (PLM index 35 7)   If she still has daytime sleepiness, I would  recommend pharmacologic treatment with Neurontin, Lyrica, Mirapex or Requip     Diagnostic study 3/30/21  (AHI) of 19 5 events per hour of sleep   The AHI in the supine position was 21 6   The AHI during REM sleep was 69 5       Compliance Data:  Type of CPAP: CPAP 10 - 20, mean 10 5, peak 18 5                                   8/17/20 - 11/17/20                                   Percent usage: 97%, > 4 hrs 82%                                   Average time used: 5 hrs 51 mins, up to 9 hrs 52 mins                                  Time in large leak: 2 mins 12 seconds                                   Residual AHI: 5 7     Compliance Data:  Type of CPAP: CPAP 10 - 20, mean 10 2, peak 12 5                                   6/27/20 - 7/26/20                                   Percent usage: 50%, > 4 hrs 26%                                   Average time used: 2 hrs 29 mins, up to 9 hrs 2 mins                                  Time in large leak: 12 mins 16 seconds                                   Residual AHI: 3 2     Compliance Data:  Type of CPAP: CPAP 10 - 20                                    8/17/19 - 11/14/19, mean 10 5, Max 17 1                                   Percent usage: 100%, > 4 hrs 83%                                   Average time used: 5 hrs 48 mins, up to 9 hrs 27 mins                                  Time in large leak: 3 mins 8 seconds                                   Residual AHI: 6 5     Compliance Data:  Type of CPAP: CPAP 9 - 20 6/29/19 - 7/28/19, mean 10 2, Max 14 1                                   Percent usage: 100%                                   Average time used: 6 hrs 22 mins, up to 8 hrs 32 mins                                  Time in large leak: 32 seconds                                   Residual AHI: 4 9        Compliance Data:  Type of CPAP: CPAP 12 3/5/19 - 4/3/19                                   Percent usage: 100%                                   Average time used: 5 hrs 40 mins, up to 8 hrs 32 mins                                  Time in large leak: 7 mins                                   Residual AHI: 2 5        Compliance Data:  Type of CPAP:  10                                   Average time used: 2 hrs                                   Residual AHI: 2 2            Kory Gale, DO

## 2022-04-13 NOTE — ED PROVIDER NOTES
History  Chief Complaint   Patient presents with    Hypotension     Pt reports follow up appt at pulmonogist, took her BP and was 63V systolic, states just feeling weak since this am       Ms Cindy Holder is a pleasant 66 yof sent over by her pulmonologist for hypotension in the setting of fatigue and generalized weakness since this morning  States that she was discharged from the hospital at the end of March on antibiotics for UTI, however returned to the ED a few days later after experiencing an allergic reaction to the antibiotic, was sent home on prednisone at that time  Was feeling "fine" until this morning, when she woke up with fatigue, generalized weakness, and lightheadedness  Has chronic, intermittent, left sided flank pain which is currently present, aching in nature, non-radiating, 2/10  Denies fevers, chills, chest pain, shortness of breath, cough, vomiting, diarrhea, constipation, dysuria, or hematuria  No recent travel or sick contacts  Started spironolactone three times weekly "a few months ago" for refractory hypertension, states that it is working well and that her BP is usually 120's/70's  Prior to Admission Medications   Prescriptions Last Dose Informant Patient Reported? Taking?    B-D ULTRAFINE III SHORT PEN 31G X 8 MM MISC  Self Yes No   Sig: USE AS DIRECTED 4 TIMES PER DAY   DULoxetine HCl (CYMBALTA PO)   Yes No   Sig: Take 40 mg by mouth 2 (two) times a day   Icosapent Ethyl (Vascepa) 1 g CAPS  Self Yes No   Sig: Take 1 g by mouth 2 (two) times a day   Krill Oil (Omega-3) 500 MG CAPS   Yes No   Sig: Take 1,000 mg by mouth 2 (two) times a day   Multiple Vitamin (multivitamin) capsule  Self Yes No   Sig: Take 1 capsule by mouth daily   Vitamin D, Ergocalciferol, 2000 units CAPS  Self Yes No   Sig: Take 2,000 Units by mouth daily    acetaminophen (TYLENOL) 500 mg tablet  Self Yes No   Sig: Take 1,000 mg by mouth as needed    albuterol (Ventolin HFA) 90 mcg/act inhaler  Self No No   Sig: Inhale 2 puffs every 6 (six) hours as needed for wheezing or shortness of breath   Patient not taking: Reported on 2022    amLODIPine (NORVASC) 5 mg tablet   No No   Sig: Take 1 tablet (5 mg total) by mouth daily   b complex vitamins tablet  Self Yes No   Sig: Take 1 tablet by mouth daily    diphenhydrAMINE (BENADRYL) 25 mg tablet   No No   Sig: Take 1 tablet (25 mg total) by mouth every 6 (six) hours as needed (facial swelling)   fluticasone (FLONASE) 50 mcg/act nasal spray  Self No No   Si-2 sprays into each nostril daily   gabapentin (NEURONTIN) 100 mg capsule  Self Yes No   Sig: Take 100 mg by mouth daily   hydrALAZINE (APRESOLINE) 25 mg tablet   No No   Sig: TAKE 1 TABLET BY MOUTH TWICE A DAY   insulin aspart (NovoLOG) 100 units/mL injection  Self Yes No   Sig: Inject under the skin 3 (three) times a day before meals 14 units, 14 units, 18 units     insulin detemir (Levemir FlexTouch) 100 Units/mL injection pen  Self Yes No   Sig: Inject 50 Units under the skin every evening    levothyroxine 112 mcg tablet   Yes No   Sig: Take 112 mcg by mouth every morning   metoprolol tartrate (LOPRESSOR) 50 mg tablet  Self No No   Sig: Take 1 tablet (50 mg total) by mouth every 12 (twelve) hours   olmesartan (BENICAR) 40 mg tablet  Self No No   Sig: TAKE 1 TABLET BY MOUTH EVERY DAY   pantoprazole (PROTONIX) 40 mg tablet   No No   Sig: Take 1 tablet (40 mg total) by mouth daily in the early morning   pramipexole (MIRAPEX) 0 25 mg tablet  Self No No   Sig: Take 1 tablet (0 25 mg total) by mouth daily at bedtime   rosuvastatin (CRESTOR) 20 MG tablet   No No   Sig: Take 1 tablet (20 mg total) by mouth daily   saccharomyces boulardii (FLORASTOR) 250 mg capsule   No No   Sig: Take 1 capsule (250 mg total) by mouth 2 (two) times a day   spironolactone (ALDACTONE) 25 mg tablet   No No   Sig: Take 1 tablet (25 mg total) by mouth 3 (three) times a week   torsemide (DEMADEX) 20 mg tablet  Self No No   Sig: TAKE 1 TABLET BY MOUTH EVERY DAY      Facility-Administered Medications: None       Past Medical History:   Diagnosis Date    Allergic     Anesthesia     pt reports "had to use double lumen endo tube for hiatal hernia repair/so surgeon could get to where he needed to work"    Arthritis     Aspiration into airway     Basal cell carcinoma 2007    left cheek     BCC (basal cell carcinoma) 05/27/2021    Left Nasal tip    Cancer [facility] )     squamous cell cancer on forhead    Cancer ([facility] )     basal cell on nose     Chronic kidney disease 2000, 2018    Stones, kidney disease stage 4    Chronic pain disorder     bilat feet and joint pain on occas    Colon polyp     COVID-19 08/2021    recovered at home/did receive monoclonal infusion    CPAP (continuous positive airway pressure) dependence     not using as has been recalled    Dental bridge present     Depression     Diabetes mellitus ([facility] )     Type 2    Diabetic neuropathy (HCC)     Disease of thyroid gland     Family history of thyroid problem     Fatty liver     GERD (gastroesophageal reflux disease)     Heart burn     History of pneumonia     Hyperlipidemia     [facility]     Hypertension     Kidney problem     Kidney stone     Memory loss Julu2 2020    Motion sickness     Obesity 1978    Obesity (BMI 30 0-34  9)     Pollen allergies     RLS (restless legs syndrome)     SCC (squamous cell carcinoma) 05/04/2021    left mid forehead    Seasonal allergies     Sleep apnea     uses CPAP    Squamous cell skin cancer 2007    left cheek     Swollen ankles     Urinary tract infection 3/28/22    Wears glasses        Past Surgical History:   Procedure Laterality Date    ARTHROSCOPY KNEE      BREAST BIOPSY      stereotactic-benign    BREAST BIOPSY      stereo-benign    BREAST EXCISIONAL BIOPSY      unknown date-benign    BREAST EXCISIONAL BIOPSY      unknown date-benign    BREAST EXCISIONAL BIOPSY      unknown date-benign    BREAST EXCISIONAL BIOPSY      unknown age-benign    CHOLECYSTECTOMY      COLONOSCOPY      EXAMINATION UNDER ANESTHESIA N/A 6/24/2021    Procedure: EXAM UNDER ANESTHESIA (EUA), DISE;  Surgeon: Lalitha Myrick MD;  [facility];  Service: ENT    HERNIA REPAIR      HIATAL HERNIA REPAIR      KNEE SURGERY      Torn maniscus lap surg    LIPOSUCTION      LYMPH NODE BIOPSY      MOHS SURGERY  05/20/2021    left mid forehead-Gautam    MOHS SURGERY Left 05/27/2021    Left nasal tip- gautam     NJ INCISION IMPLANT CRANIAL NERVE STIM ELECTRODE/PULSE GEN N/A 11/10/2021    Procedure: INSERTION UPPER AIRWAY STIMULATOR, INSPIRE IMPLANT;  Surgeon: Lalitha Myrick MD;  Location[facility];  Service: ENT    REDUCTION MAMMAPLASTY Bilateral 2000    REDUCTION MAMMAPLASTY      SKIN BIOPSY      SKIN CANCER EXCISION  2007    squamous cell carcinoma     SKIN CANCER EXCISION  2007    basal cell carcinoma    SQUAMOUS CELL CARCINOMA EXCISION      TOE SURGERY      TONSILLECTOMY      TUBAL LIGATION      UPPER GASTROINTESTINAL ENDOSCOPY         Family History   Problem Relation Age of Onset    Heart disease Mother     Depression Mother     Hypertension Mother     COPD Mother     Hearing loss Mother     Anxiety disorder Mother     Heart disease Father     Lung cancer Father 79        Smoker     Cancer Father         brain    Alcohol abuse Father     Dementia Father     Hypertension Father     Thyroid disease Father     COPD Father     Arthritis Father     Brain cancer Father 76    Hypertension Sister     Diabetes Sister     Heart disease Sister     Thyroid disease Sister     Cancer Sister         Lympoma    Lung cancer Sister 78    Anxiety disorder Sister     Hypertension Brother     Diabetes Brother     Cancer Brother         Throat    Dementia Brother     Stroke Brother     Hypertension Brother     No Known Problems Son     No Known Problems Son     Heart disease Brother     Diabetes Brother     Brain cancer Paternal Aunt         unknown age     I have reviewed and agree with the history as documented  E-Cigarette/Vaping    E-Cigarette Use Never User      E-Cigarette/Vaping Substances    Nicotine No     THC No     CBD No     Flavoring No     Other No     Unknown No      Social History     Tobacco Use    Smoking status: Former Smoker     Packs/day: 2 00     Years: 10 00     Pack years: 20 00     Types: Cigarettes     Start date: 1/10/1968     Quit date:      Years since quittin 2    Smokeless tobacco: Former User     Quit date: 1976    Tobacco comment: Smoked 2 pack a day   Vaping Use    Vaping Use: Never used   Substance Use Topics    Alcohol use: Not Currently     Alcohol/week: 0 0 standard drinks     Comment: 1 or 2 a year    Drug use: No        Review of Systems   Constitutional: Positive for activity change and fatigue  Negative for appetite change, chills, diaphoresis and fever  HENT: Negative  Negative for congestion, rhinorrhea, sneezing and sore throat  Eyes: Negative  Negative for pain and visual disturbance  Respiratory: Negative  Negative for cough, shortness of breath and wheezing  Cardiovascular: Negative  Negative for chest pain, palpitations and leg swelling  Gastrointestinal: Negative  Negative for abdominal distention, abdominal pain, constipation, diarrhea, nausea and vomiting  Endocrine: Positive for polydipsia  Genitourinary: Positive for flank pain  Negative for difficulty urinating, dysuria, hematuria, pelvic pain, urgency, vaginal bleeding and vaginal discharge  Chronic intermittent left sided flank pain, currently present, unchanged from prior episodes  Musculoskeletal: Negative for back pain and neck pain  Skin: Positive for pallor  Negative for rash and wound  Allergic/Immunologic: Negative  Neurological: Positive for weakness and light-headedness   Negative for dizziness, syncope, facial asymmetry, speech difficulty and headaches  Generalized weakness and lightheadedness since this morning  Hematological: Negative  Psychiatric/Behavioral: Negative  Negative for confusion  All other systems reviewed and are negative  Physical Exam  ED Triage Vitals [04/13/22 1311]   Temperature Pulse Respirations Blood Pressure SpO2   98 3 °F (36 8 °C) 58 16 96/55 98 %      Temp Source Heart Rate Source Patient Position - Orthostatic VS BP Location FiO2 (%)   Oral Monitor Sitting Left arm --      Pain Score       3             Orthostatic Vital Signs  Vitals:    04/13/22 1648 04/13/22 2119 04/14/22 0403 04/14/22 0717   BP: 138/69 118/64 132/75 131/71   Pulse: 61 69 68 60   Patient Position - Orthostatic VS: Lying Lying         Physical Exam  Vitals and nursing note reviewed  Constitutional:       Appearance: Normal appearance  HENT:      Head: Normocephalic and atraumatic  Nose: Nose normal  No congestion or rhinorrhea  Mouth/Throat:      Mouth: Mucous membranes are dry  Pharynx: Oropharynx is clear  No oropharyngeal exudate or posterior oropharyngeal erythema  Eyes:      General: No scleral icterus  Extraocular Movements: Extraocular movements intact  Conjunctiva/sclera: Conjunctivae normal       Pupils: Pupils are equal, round, and reactive to light  Cardiovascular:      Rate and Rhythm: Regular rhythm  Bradycardia present  Pulses: Normal pulses  Heart sounds: Normal heart sounds  No murmur heard  No friction rub  No gallop  Pulmonary:      Effort: Pulmonary effort is normal  No respiratory distress  Breath sounds: Normal breath sounds  No wheezing, rhonchi or rales  Abdominal:      General: Abdomen is flat  Bowel sounds are normal  There is no distension  Palpations: Abdomen is soft  Tenderness: There is no abdominal tenderness  There is left CVA tenderness  Musculoskeletal:         General: No swelling or tenderness   Normal range of motion  Cervical back: Normal range of motion and neck supple  No tenderness  Right lower leg: No edema  Left lower leg: No edema  Lymphadenopathy:      Cervical: No cervical adenopathy  Skin:     General: Skin is warm and dry  Capillary Refill: Capillary refill takes less than 2 seconds  Coloration: Skin is pale  Skin is not jaundiced  Findings: No bruising or rash  Neurological:      General: No focal deficit present  Mental Status: She is alert and oriented to person, place, and time  Cranial Nerves: No cranial nerve deficit  Sensory: No sensory deficit  Motor: No weakness        Coordination: Coordination normal    Psychiatric:         Mood and Affect: Mood normal          Behavior: Behavior normal          ED Medications  Medications   lactated ringers bolus 500 mL (0 mL Intravenous Stopped 4/13/22 1407)   lactated ringers bolus 500 mL (0 mL Intravenous Stopped 4/13/22 1611)   ceftriaxone (ROCEPHIN) 1 g/50 mL in dextrose IVPB (0 mg Intravenous Stopped 4/13/22 1611)       Diagnostic Studies  Results Reviewed     Procedure Component Value Units Date/Time    Urine culture [[id number]]  (Abnormal)  (Susceptibility) Collected: 04/13/22 1428    Lab Status: Final result Specimen: Urine, Other Updated: 04/15/22 1644     Urine Culture >100,000 cfu/ml Enterobacter cloacae complex    Susceptibility     Enterobacter cloacae complex (1)     Antibiotic Interpretation Microscan   Method Status    ZID Performed  Yes  JORDAN Final    Amoxicillin + Clavulanate Resistant >16/8 ug/ml JORDAN Final    Ampicillin ($$) Resistant >16 00 ug/ml JORDAN Final    Ampicillin + Sulbactam ($) Resistant >16/8 ug/ml JORDAN Final    Aztreonam ($$$)  Intermediate 8 ug/ml JORDAN Final    Cefazolin ($) Resistant >16 00 ug/ml JORDAN Final    Cefepime ($) Susceptible <=2 00 ug/ml JORDAN Final    Ceftazidime ($$) Resistant 16 ug/ml JORDAN Final    Ceftriaxone ($$) Resistant 32 00 ug/ml JORDAN Final    Cefuroxime ($$) Resistant >16 ug/ml JORDAN Final    Ciprofloxacin ($)  Susceptible <=0 25 ug/ml JORDAN Final    Ertapenem ($$$) Susceptible <=0 5 ug/ml JORDAN Final    Gentamicin ($$) Susceptible <=2 ug/ml JORDAN Final    Levofloxacin ($) Susceptible <=0 50 ug/ml JORDAN Final    Nitrofurantoin Susceptible <=32 ug/ml JORDAN Final    Piperacillin + Tazobactam ($$$) Susceptible <=8 ug/ml JORDAN Final    Tetracycline Susceptible <=4 ug/ml JORDAN Final    Tobramycin ($) Susceptible <=2 ug/ml JORDAN Final    Trimethoprim + Sulfamethoxazole ($$$) Susceptible <=0 5/9 5 ug/ml JORDAN Final                   Urine Microscopic [[id number]]  (Abnormal) Collected: 04/13/22 1428    Lab Status: Final result Specimen: Urine, Other Updated: 04/13/22 1448     RBC, UA None Seen /hpf      WBC, UA 10-20 /hpf      Epithelial Cells None Seen /hpf      Bacteria, UA Moderate /hpf     UA w Reflex to Microscopic w Reflex to Culture [[id number]]  (Abnormal) Collected: 04/13/22 1428    Lab Status: Final result Specimen: Urine, Other Updated: 04/13/22 1441     Color, UA Yellow     Clarity, UA Slightly Cloudy     Specific Gravity, UA <=1 005     pH, UA 6 0     Leukocytes, UA Small     Nitrite, UA Positive     Protein, UA Negative mg/dl      Glucose, UA Negative mg/dl      Ketones, UA Negative mg/dl      Urobilinogen, UA 0 2 E U /dl      Bilirubin, UA Negative     Blood, UA Negative    Lactic acid [[id number]]  (Normal) Collected: 04/13/22 1336    Lab Status: Final result Specimen: Blood from Arm, Right Updated: 04/13/22 1435     LACTIC ACID 0 6 mmol/L     Narrative:      Result may be elevated if tourniquet was used during collection      Urine Macroscopic, POC [[id number]]  (Abnormal) Collected: 04/13/22 1429    Lab Status: Final result Specimen: Urine Updated: 04/13/22 1431     Color, UA Yellow     Clarity, UA Clear     pH, UA 6 0     Leukocytes, UA Small     Nitrite, UA Negative     Protein, UA Negative mg/dl      Glucose, UA Negative mg/dl      Ketones, UA Negative mg/dl      Urobilinogen, UA 0 2 E U /dl      Bilirubin, UA Negative     Blood, UA Negative     Specific Gravity, UA 1 010    Narrative:      CLINITEK RESULT    HS Troponin 0hr (reflex protocol) [[id number]]  (Normal) Collected: 04/13/22 1336    Lab Status: Final result Specimen: Blood from Arm, Right Updated: 04/13/22 1420     hs TnI 0hr <2 ng/L     Fingerstick Glucose (POCT) [[id number]]  (Normal) Collected: 04/13/22 1415    Lab Status: Final result Updated: 04/13/22 1416     POC Glucose 101 mg/dl     Comprehensive metabolic panel [[id number]]  (Abnormal) Collected: 04/13/22 1336    Lab Status: Final result Specimen: Blood from Arm, Right Updated: 04/13/22 1414     Sodium 137 mmol/L      Potassium 4 8 mmol/L      Chloride 99 mmol/L      CO2 24 mmol/L      ANION GAP 14 mmol/L      BUN 77 mg/dL      Creatinine 2 27 mg/dL      Glucose 106 mg/dL      Calcium 9 2 mg/dL      AST 27 U/L      ALT 53 U/L      Alkaline Phosphatase 91 U/L      Total Protein 7 4 g/dL      Albumin 3 8 g/dL      Total Bilirubin 0 31 mg/dL      eGFR 20 ml/min/1 73sq m     Narrative:      [place] guidelines for Chronic Kidney Disease (CKD):     Stage 1 with normal or high GFR (GFR > 90 mL/min/1 73 square meters)    Stage 2 Mild CKD (GFR = 60-89 mL/min/1 73 square meters)    Stage 3A Moderate CKD (GFR = 45-59 mL/min/1 73 square meters)    Stage 3B Moderate CKD (GFR = 30-44 mL/min/1 73 square meters)    Stage 4 Severe CKD (GFR = 15-29 mL/min/1 73 square meters)    Stage 5 End Stage CKD (GFR <15 mL/min/1 73 square meters)  Note: GFR calculation is accurate only with a steady state creatinine    Magnesium [[id number]]  (Abnormal) Collected: 04/13/22 1336    Lab Status: Final result Specimen: Blood from Arm, Right Updated: 04/13/22 1414     Magnesium 2 7 mg/dL     CBC and differential [[id number]]  (Abnormal) Collected: 04/13/22 1336    Lab Status: Final result Specimen: Blood from Arm, Right Updated: 04/13/22 1359     WBC 11 87 Thousand/uL      RBC 4 39 Million/uL      Hemoglobin 13 3 g/dL      Hematocrit 38 2 %      MCV 87 fL      MCH 30 3 pg      MCHC 34 8 g/dL      RDW 14 0 %      MPV 11 4 fL      Platelets 568 Thousands/uL      nRBC 0 /100 WBCs      Neutrophils Relative 59 %      Immat GRANS % 1 %      Lymphocytes Relative 29 %      Monocytes Relative 9 %      Eosinophils Relative 2 %      Basophils Relative 0 %      Neutrophils Absolute 7 09 Thousands/µL      Immature Grans Absolute 0 07 Thousand/uL      Lymphocytes Absolute 3 38 Thousands/µL      Monocytes Absolute 1 11 Thousand/µL      Eosinophils Absolute 0 18 Thousand/µL      Basophils Absolute 0 04 Thousands/µL                  XR chest 1 view portable   Final Result by Kate Aranda MD (04/13 1536)      No acute cardiopulmonary disease  Findings are stable            Workstation performed: [id number]               Procedures  ECG 12 Lead Documentation Only    Date/Time: 4/13/2022 1:59 PM  Performed by: Ilya Granger DO  Authorized by: Ilya Granger DO     Indications / Diagnosis:  Hypotension  Patient location:  ED  Previous ECG:     Previous ECG:  Compared to current    Comparison ECG info:  04/05/2022    Similarity:  Changes noted  Interpretation:     Interpretation: abnormal    Rate:     ECG rate:  60    ECG rate assessment: normal    Rhythm:     Rhythm: sinus rhythm    Ectopy:     Ectopy: none    QRS:     QRS axis:  Normal    QRS intervals:  Normal  Conduction:     Conduction: abnormal      Abnormal conduction: 1st degree    ST segments:     ST segments:  Normal  T waves:     T waves: normal            ED Course  ED Course as of 04/17/22 1927 Wed Apr 13, 2022   1359 New 1st degree AV block on ECG  MDM  Number of Diagnoses or Management Options  Hypotension  UTI (urinary tract infection)  Diagnosis management comments: Ms Kylee Joshi is a 66 yof presenting with fatigue, generalized weakness, and hypotension    Had recent UTI, but was told to stop taking antibiotic after 3 days due to allergic reaction, was not prescribed an alternate medication  Pt is pale appearing, has BP 90's/50's, with left sided flank pain and dry oral mucosa on exam  Remainder of physical exam unremarkable  WBC 11 87, BUN significantly elevated from baseline, however creatinine at baseline for pt  Troponin WNL  UA with evidence of UTI  Urine culture sent  EKG with NSR, new 1st degree AV block, no evidence of ischemia or infarct  Doubt ACS  CXR without acute cardiopulmonary findings  Given IV fluids, started on antibiotics for UTI  BP responsive to fluid bolus  Admitted to medicine for UTI with generalized weakness  Stable on admission             Amount and/or Complexity of Data Reviewed  Clinical lab tests: ordered and reviewed        Disposition  Final diagnoses:   UTI (urinary tract infection)   Hypotension     Time reflects when diagnosis was documented in both MDM as applicable and the Disposition within this note     Time User Action Codes Description Comment    4/13/2022  3:23 PM Negrito Barter Add [N39 0] UTI (urinary tract infection)     4/13/2022  3:23 PM Negrito Barter Add [I95 9] Hypotension     4/13/2022  6:30 PM Enzo Cardona Add [N18 4,  D63 1] Anemia in stage 4 chronic kidney disease ([facility] )     4/13/2022  6:30 PM Margoth Borrero Remove [N18 4,  D63 1] Anemia in stage 4 chronic kidney disease ([facility] )     4/13/2022  6:30 PM Margoth Borrero Add [N17 9] ANDRA [facility]acute kidney [facility]     4/14/2022  9:06 AM Celia Peace Add [I12 9] Hypertensive chronic kidney disease with stage 1 through stage 4 chronic kidney disease, or unspecified chronic kidney disease     4/14/2022  9:06 AM Celia Peace Add [N18 30] CKD (chronic kidney disease) stage 3, GFR 30-59 ml/min ([facility] )     4/14/2022  9:06 AM Celia Peace Modify [I12 9] Hypertensive chronic kidney disease with stage 1 through stage 4 chronic kidney disease, or unspecified chronic kidney disease       ED Disposition     ED Disposition Condition Date/Time Comment    Admit Stable Wed Apr 13, 2022  3:23 PM Case was discussed with Dr Ajay Dinh and the patient's admission status was agreed to be Admission Status: inpatient status to the service of Dr Ajay Dinh   Follow-up Information     Follow up With Specialties Details Why Contact Info    Samantha Rodgers MD Family Medicine Schedule an appointment as soon as possible for a visit in 1 week(s)  [address] [address]    [address] [place] [address]      Nuvia Awad MD Nephrology Schedule an appointment as soon as possible for a visit in 1 week(s)  [address] [address]  [phone]            Discharge Medication List as of 4/14/2022 10:03 AM      START taking these medications    Details   doxycycline hyclate (VIBRAMYCIN) 100 mg capsule Take 1 capsule (100 mg total) by mouth every 12 (twelve) hours for 4 days, Starting Thu 4/14/2022, Until Mon 4/18/2022, Normal         CONTINUE these medications which have CHANGED    Details   acetaminophen (TYLENOL) 325 mg tablet Take 2 tablets (650 mg total) by mouth every 6 (six) hours as needed for mild pain, headaches or fever, Starting Thu 4/14/2022, No Print         CONTINUE these medications which have NOT CHANGED    Details   albuterol (Ventolin HFA) 90 mcg/act inhaler Inhale 2 puffs every 6 (six) hours as needed for wheezing or shortness of breath, Starting Tue 5/5/2020, No Print      amLODIPine (NORVASC) 5 mg tablet Take 1 tablet (5 mg total) by mouth daily, Starting Wed 3/23/2022, Normal      b complex vitamins tablet Take 1 tablet by mouth daily , Historical Med      B-D ULTRAFINE III SHORT PEN 31G X 8 MM MISC USE AS DIRECTED 4 TIMES PER DAY, Historical Med      DULoxetine HCl (CYMBALTA PO) Take 40 mg by mouth 2 (two) times a day, Historical Med      fluticasone (FLONASE) 50 mcg/act nasal spray 1-2 sprays into each nostril daily, Starting Thu 1/13/2022, Normal      gabapentin (NEURONTIN) 100 mg capsule Take 100 mg by mouth daily, Starting Tue 12/14/2021, Historical Med      Icosapent Ethyl (Vascepa) 1 g CAPS Take 1 g by mouth 2 (two) times a day, Historical Med      insulin aspart (NovoLOG) 100 units/mL injection Inject under the skin 3 (three) times a day before meals 14 units, 14 units, 18 units  , Historical Med      insulin detemir (Levemir FlexTouch) 100 Units/mL injection pen Inject 50 Units under the skin every evening , Starting Tue 6/8/2021, Historical Med      [employer] ([place]-3) 500 MG CAPS Take 1,000 mg by mouth 2 (two) times a day, Starting Wed 4/6/2022, Until Tue 4/11/2023, Historical Med      levothyroxine 112 mcg tablet Take 112 mcg by mouth every morning, Starting Wed 4/6/2022, Historical Med      metoprolol tartrate (LOPRESSOR) 50 mg tablet Take 1 tablet (50 mg total) by mouth every 12 (twelve) hours, Starting Mon 4/26/2021, Normal      pantoprazole (PROTONIX) 40 mg tablet Take 1 tablet (40 mg total) by mouth daily in the early morning, Starting Sat 4/2/2022, Until Mon 5/2/2022, Normal      pramipexole (MIRAPEX) 0 25 mg tablet Take 1 tablet (0 25 mg total) by mouth daily at bedtime, Starting Wed 12/22/2021, Normal      rosuvastatin (CRESTOR) 20 MG tablet Take 1 tablet (20 mg total) by mouth daily, Starting Fri 4/1/2022, Until Sun 5/1/2022, Normal      saccharomyces boulardii (FLORASTOR) 250 mg capsule Take 1 capsule (250 mg total) by mouth 2 (two) times a day, Starting Fri 4/1/2022, Until Sun 5/1/2022, Normal      torsemide (DEMADEX) 20 mg tablet TAKE 1 TABLET BY MOUTH EVERY DAY, Normal      Vitamin D, Ergocalciferol, 2000 units CAPS Take 2,000 Units by mouth daily , Historical Med         STOP taking these medications       diphenhydrAMINE (BENADRYL) 25 mg tablet Comments:   Reason for Stopping:         hydrALAZINE (APRESOLINE) 25 mg tablet Comments:   Reason for Stopping:         Multiple Vitamin (multivitamin) capsule Comments:   Reason for Stopping: olmesartan (BENICAR) 40 mg tablet Comments:   Reason for Stopping:         spironolactone (ALDACTONE) 25 mg tablet Comments:   Reason for Stopping:             Outpatient Discharge Orders   Discharge Diet     Activity as tolerated       PDMP Review       Value Time User    PDMP Reviewed  Yes 11/10/2021  3:47 PM Marlene Rollins MD           ED Provider  Attending physically available and evaluated Sugey Jara  BINH managed the patient along with the ED Attending      Electronically Signed by         Sammy Andersen DO  04/17/22 1744

## 2022-04-13 NOTE — ASSESSMENT & PLAN NOTE
Patient is complaining of incomplete bladder evacuation and we will get a bladder scan  Possible urinary retention  May consider offering Flomax if bladder scan showing urine retention greater than 350 cc

## 2022-04-13 NOTE — H&P
[facility]  [facility] 1950, 66 y o  female MRN: [id number]  Unit/Bed#: W MS 26268 Encounter: [id number]  Primary Care Provider: Stephie Sharma MD   Date and time admitted to hospital: 4/13/2022  1:12 PM    Hypotension  Assessment & Plan  Hypotension possible secondary to multiple antihypertensive medications  However patient has been on these antihypertensive medications for prolonged period of time  Patient's urinalysis was inconclusive, 1 being positive the other negative  We will repeat 1 more urinalysis with straight catheterization  Patient already received a dose of antibiotic in the ED  With IV fluid resuscitation patient blood pressure is better  We will consider continue antibiotic if urinalysis positive in the straight cath urine  However patient did not have any fevers at home  No chills or rigors are no CVA or flank pain  * Acute kidney injury superimposed on chronic kidney disease [facility])  Assessment & Plan  Lab Results   Component Value Date    EGFR 20 04/13/2022    EGFR 20 04/03/2022    EGFR 30 04/01/2022    CREATININE 2 27 (H) 04/13/2022    CREATININE 2 27 (H) 04/03/2022    CREATININE 1 65 (H) 04/01/2022     Multifactorial   Possible secondary to dehydration from diuretic and hypotension, also possible etiology could be secondary to postrenal given the fact patient mentioned that she has been noticing difficulty emptying her bladder  Patient did admit to drinking enough fluids at home though  We will check a bladder scan and evaluate the patient  If tomorrow a m  BMP not improve creatinine, we will consider getting a renal ultrasound  Consult Nephrology for further evaluation given the fact patient has CKD  Hold patient's antihypertensive medications other than metoprolol  Hold diuretic      Hypertensive chronic kidney disease with stage 1 through stage 4 chronic kidney disease, or unspecified chronic kidney disease  Assessment & Plan  Lab Results   Component Value Date    EGFR 20 04/13/2022    EGFR 20 04/03/2022    EGFR 30 04/01/2022    CREATININE 2 27 (H) 04/13/2022    CREATININE 2 27 (H) 04/03/2022    CREATININE 1 65 (H) 04/01/2022     Currently running hypotensive and hence we will hold antihypertensive medications other than metoprolol  Urinary retention  Assessment & Plan  Patient is complaining of incomplete bladder evacuation and we will get a bladder scan  Possible urinary retention  May consider offering Flomax if bladder scan showing urine retention greater than 350 cc  Type 2 diabetes mellitus with diabetic nephropathy, with long-term current use of insulin [facility])  Assessment & Plan  Lab Results   Component Value Date    HGBA1C 7 4 (H) 04/05/2022       Recent Labs     04/13/22  1415 04/13/22  1823   POCGLU 101 102       Blood Sugar Average: Last 72 hrs:  (P) 101 5     Patient's Accu-Cheks have been remain low, will decrease the patient's Lantus to 20 units at bedtime and continue patient on low-dose sliding scale insulin with Accu-Chek q i d     RLS (restless legs syndrome)  Assessment & Plan  Continue pramipexole        Date of Service:   04/13/22    VTE Prophylaxis: VTE Score: 3 Moderate Risk (Score 3-4) - Pharmacological DVT Prophylaxis Ordered: heparin  Code Status: Prior  POLST:There is no POLST form on file for this patient (pre-hospital)   Discussion with family:  Updated  () via phone  Anticipated Length of Stay:  Patient will be admitted on an Inpatient basis with an anticipated length of stay of  > 2 midnights  Justification for Hospital Stay: ANDRA on CKD, Hypotension  Total Time for Visit, including Counseling / Coordination of Care: 1 hour  Greater than 50% of this total time spent on direct patient counseling and coordination of care      Chief Complaint:     Hypotension (Pt reports follow up appt at pulmonogist, took her BP and was 91G systolic, states just feeling weak since this am  )    History of Present Illness:    Bart Wagner is a 66 y o  female with PMHx significant for CKD Stg III, DM type II,  who presents with hypertension, pulmonary hypertension, history of fibromyalgia presented to the ED sent in by her pulmonologist because she was found to be hypotensive in their office  When the patient visited her pulmonologist today she was feeling weak and tired and when they checked her blood pressure her vitals showed systolic blood pressure in the 90s and hence patient was sent to the ED for further evaluation  Patient denies any fevers or chills but mentioned no back pain or flank pain  Did complain of some difficulty emptying her bladder  However denies any hematuria  Did complain of some dysuria or discomfort in her vaginal area recently which she tried multiple over-the-counter medications for the same  On arrival to the ED patient's vitals were as follows:  Temperature 97 8°, pulse 61, respiratory rate 16, blood pressure 96/55, saturating 98% on room air  With IV fluid resuscitation patient blood pressure improved to 138/68  Further blood work showed leukocytosis of 11 9, creatinine was elevated at 2 27 with a BUN of 77 and an anion gap of 14  Bicarb was stable at 24  Patient had a urinalysis done twice in the ED 1 was positive other was negative  Unclear patient has active abnormal UA  Patient received a dose of IV ceftriaxone in the ED  Patient is being admitted for hypotension and John on CKD  Review of Systems:  Review of Systems: A thorough 10 point review of System was done which was negative for other than that mentioned in HPI       Past Medical and Surgical History:     Past Medical History:   Diagnosis Date    Allergic     Anesthesia     pt reports "had to use double lumen endo tube for hiatal hernia repair/so surgeon could get to where he needed to work"    Arthritis     Aspiration into airway     Basal cell carcinoma 2007    left cheek     BCC (basal cell carcinoma) 05/27/2021    Left Nasal tip    Cancer (HCC)     squamous cell cancer on forhead    Cancer (HCC)     basal cell on nose     Chronic kidney disease 2000, 2018    Stones, kidney disease stage 4    Chronic pain disorder     bilat feet and joint pain on occas    Colon polyp     COVID-19 08/2021    recovered at home/did receive monoclonal infusion    CPAP (continuous positive airway pressure) dependence     not using as has been recalled    Dental bridge present     Depression     Diabetes mellitus ([facility] [address] )     Type 2    Diabetic neuropathy (HCC)     Disease of thyroid gland     Family history of thyroid problem     Fatty liver     GERD (gastroesophageal reflux disease)     Heart burn     History of pneumonia     Hyperlipidemia     Hyperplasia, parathyroid ([facility] [address] )     Hypertension     Kidney problem     Kidney stone     Memory loss Julu2 2020    Motion sickness     Obesity 1978    Obesity (BMI 30 0-34  9)     Pollen allergies     RLS (restless legs syndrome)     SCC (squamous cell carcinoma) 05/04/2021    left mid forehead    Seasonal allergies     Sleep apnea     uses CPAP    Squamous cell skin cancer 2007    left cheek     Swollen ankles     Urinary tract infection 3/28/22    Wears glasses        Past Surgical History:   Procedure Laterality Date    ARTHROSCOPY KNEE      BREAST BIOPSY      stereotactic-benign    BREAST BIOPSY      stereo-benign    BREAST EXCISIONAL BIOPSY      unknown date-benign    BREAST EXCISIONAL BIOPSY      unknown date-benign    BREAST EXCISIONAL BIOPSY      unknown date-benign    BREAST EXCISIONAL BIOPSY      unknown age-benign    CHOLECYSTECTOMY      COLONOSCOPY      EXAMINATION UNDER ANESTHESIA N/A 6/24/2021    Procedure: EXAM UNDER ANESTHESIA (EUA), DISE;  Surgeon: Hossein Dowd MD;  Location: [facility] MAIN OR;  Service: ENT    HERNIA REPAIR      HIATAL HERNIA REPAIR      KNEE SURGERY      Torn tracey lap surg    LIPOSUCTION      LYMPH NODE BIOPSY      MOHS SURGERY  05/20/2021    left mid forehead-Gautam    MOHS SURGERY Left 05/27/2021    Left nasal tip- gautam     MI INCISION IMPLANT CRANIAL NERVE STIM ELECTRODE/PULSE GEN N/A 11/10/2021    Procedure: INSERTION UPPER AIRWAY STIMULATOR, INSPIRE IMPLANT;  Surgeon: Luis Iraheta MD;  Location: [facility] Main OR;  Service: ENT    REDUCTION MAMMAPLASTY Bilateral 2000    REDUCTION MAMMAPLASTY      SKIN BIOPSY      SKIN CANCER EXCISION  2007    squamous cell carcinoma     SKIN CANCER EXCISION  2007    basal cell carcinoma    SQUAMOUS CELL CARCINOMA EXCISION      TOE SURGERY      TONSILLECTOMY      TUBAL LIGATION      UPPER GASTROINTESTINAL ENDOSCOPY         Meds/Allergies:  Prior to Admission medications    Medication Sig Start Date End Date Taking?  Authorizing Provider   acetaminophen (TYLENOL) 500 mg tablet Take 1,000 mg by mouth as needed     Historical Provider, MD   albuterol (Ventolin HFA) 90 mcg/act inhaler Inhale 2 puffs every 6 (six) hours as needed for wheezing or shortness of breath  Patient not taking: Reported on 4/13/2022 5/5/20   Rufina Peace PA-C   amLODIPine (NORVASC) 5 mg tablet Take 1 tablet (5 mg total) by mouth daily 3/23/22   Eben Mandujano MD   b complex vitamins tablet Take 1 tablet by mouth daily     Historical Provider, MD CARREON ULTRAFINE III SHORT PEN 31G X 8 MM MISC USE AS DIRECTED 4 TIMES PER DAY 7/26/19   Historical Provider, MD   diphenhydrAMINE (BENADRYL) 25 mg tablet Take 1 tablet (25 mg total) by mouth every 6 (six) hours as needed (facial swelling) 4/3/22   Gabi Fontanez MD   DULoxetine HCl (CYMBALTA PO) Take 40 mg by mouth 2 (two) times a day    Historical Provider, MD   fluticasone (FLONASE) 50 mcg/act nasal spray 1-2 sprays into each nostril daily 1/13/22   Sloan Gallo DO   gabapentin (NEURONTIN) 100 mg capsule Take 100 mg by mouth daily 12/14/21   Historical Provider, MD   hydrALAZINE (APRESOLINE) 25 mg tablet TAKE 1 TABLET BY MOUTH TWICE A DAY 3/2/22   Partha Bernardo MD   Icosapent Ethyl (Vascepa) 1 g CAPS Take 1 g by mouth 2 (two) times a day    Historical Provider, MD   insulin aspart (NovoLOG) 100 units/mL injection Inject under the skin 3 (three) times a day before meals 14 units, 14 units, 18 units      Historical Provider, MD   insulin detemir (Levemir FlexTouch) 100 Units/mL injection pen Inject 50 Units under the skin every evening  6/8/21   Historical Provider, MD Ortiz Paulino Oil (Omega-3) 500 MG CAPS Take 1,000 mg by mouth 2 (two) times a day 4/6/22 4/11/23  Historical Provider, MD   levothyroxine 112 mcg tablet Take 112 mcg by mouth every morning 4/6/22   Historical Provider, MD   levothyroxine 125 mcg tablet Take 1 tablet (125 mcg total) by mouth daily in the early morning 4/2/22 5/2/22  Dinah Riddle DO   metoprolol tartrate (LOPRESSOR) 50 mg tablet Take 1 tablet (50 mg total) by mouth every 12 (twelve) hours 4/26/21   Partha Bernardo MD   Multiple Vitamin (multivitamin) capsule Take 1 capsule by mouth daily    Historical Provider, MD   olmesartan (BENICAR) 40 mg tablet TAKE 1 TABLET BY MOUTH EVERY DAY 10/19/21   Maria G Dangelo PA-C   pantoprazole (PROTONIX) 40 mg tablet Take 1 tablet (40 mg total) by mouth daily in the early morning 4/2/22 5/2/22  Dinah Riddle DO   pramipexole (MIRAPEX) 0 25 mg tablet Take 1 tablet (0 25 mg total) by mouth daily at bedtime 12/22/21   Render Anon,    rosuvastatin (CRESTOR) 20 MG tablet Take 1 tablet (20 mg total) by mouth daily 4/1/22 5/1/22  Dinah Riddle DO   saccharomyces boulardii (FLORASTOR) 250 mg capsule Take 1 capsule (250 mg total) by mouth 2 (two) times a day 4/1/22 5/1/22  Dinah Riddle DO   spironolactone (ALDACTONE) 25 mg tablet Take 1 tablet (25 mg total) by mouth 3 (three) times a week 2/16/22   Partha Bernardo MD   torsemide (DEMADEX) 20 mg tablet TAKE 1 TABLET BY MOUTH EVERY DAY 7/16/21   Partha Bernardo MD   Vitamin D, Ergocalciferol, 2000 units CAPS Take 2,000 Units by mouth daily     Historical Provider, MD     I have reviewed home medications with patient personally  Allergies:    Allergies   Allergen Reactions    Sulfamethoxazole-Trimethoprim Other (See Comments)     Tongue swelling    Ciprofloxacin Hives and Itching       Social History:     Marital Status: /Civil Union   Occupation: Reviewed and Non Contributory   Patient Pre-hospital Living Situation: Home   Patient Pre-hospital Level of Mobility: Walks   Patient Pre-hospital Diet Restrictions: None     Substance Use History:   Social History     Substance and Sexual Activity   Alcohol Use Not Currently    Alcohol/week: 0 0 standard drinks    Comment: 1 or 2 a year     Social History     Tobacco Use   Smoking Status Former Smoker    Packs/day: 2 00    Years: 10 00    Pack years: 20 00    Types: Cigarettes    Start date: 1/10/1968   Dana Splinter Quit date: 12    Years since quittin 4   Smokeless Tobacco Former User    Quit date: 1976   Tobacco Comment    Smoked 2 pack a day     Social History     Substance and Sexual Activity   Drug Use No       Family History:    Family History   Problem Relation Age of Onset    Heart disease Mother     Depression Mother     Hypertension Mother     COPD Mother     Hearing loss Mother     Anxiety disorder Mother     Heart disease Father     Lung cancer Father 79        Smoker     Cancer Father         brain    Alcohol abuse Father     Dementia Father     Hypertension Father     Thyroid disease Father     COPD Father     Arthritis Father     Brain cancer Father 76    Hypertension Sister     Diabetes Sister     Heart disease Sister     Thyroid disease Sister    Dana Splinter Cancer Sister         Lympoma    Lung cancer Sister 78    Anxiety disorder Sister     Hypertension Brother     Diabetes Brother     Cancer Brother         Throat    Dementia Brother     Stroke Brother     Hypertension Brother     No Known Problems Son     No Known Problems Son     Heart disease Brother     Diabetes Brother     Brain cancer Paternal Aunt         unknown age       Physical Exam:     Vitals:   Blood Pressure: 138/69 (04/13/22 1648)  Pulse: 61 (04/13/22 1648)  Temperature: 97 8 °F (36 6 °C) (04/13/22 1648)  Temp Source: Oral (04/13/22 1648)  Respirations: 16 (04/13/22 1648)  Height: 5' 3" (160 cm) (04/13/22 1311)  Weight - Scale: 77 2 kg (170 lb 3 1 oz) (04/13/22 1648)  SpO2: 95 % (04/13/22 1648)    Physical Exam  General Exam: Alert and Oriented x 3, NAD  Eyes: CHRISTINA  Neck: Supple  CVS: S1, S2 [place], RRR    R/S: Clear to auscultate anteriorly  Abd: Soft, NT, ND, BS+ve  Extremities: No edema noted  Skin: No acute Rash noted  CNS: No acute FND  Moves all 4 extremities  Psych: Co-operative, Not agitated  Lab Results: I have personally reviewed pertinent reports  Results from last 7 days   Lab Units 04/13/22  1336   WBC Thousand/uL 11 87*   HEMOGLOBIN g/dL 13 3   HEMATOCRIT % 38 2   PLATELETS Thousands/uL 318     Results from last 7 days   Lab Units 04/13/22  1336   POTASSIUM mmol/L 4 8   CHLORIDE mmol/L 99*   CO2 mmol/L 24   BUN mg/dL 77*   CREATININE mg/dL 2 27*   CALCIUM mg/dL 9 2   ALK PHOS U/L 91   ALT U/L 53   AST U/L 27         Results from last 7 days   Lab Units 04/13/22  1823 04/13/22  1415   POC GLUCOSE mg/dl 102 101     Results from last 7 days   Lab Units 04/13/22  1336   LACTIC ACID mmol/L 0 6       Imaging: I have personally reviewed pertinent reports  XR chest 1 view portable   Final Result by Rodriguez Salinas MD (04/13 1536)      No acute cardiopulmonary disease  Findings are stable            Workstation performed: [id number]             XR chest 1 view portable   Final Result      No acute cardiopulmonary disease        Findings are stable            Workstation performed: [id number]             EKG, Pathology, and Other Studies Reviewed on Admission:     Allscripts/EPIC Records Reviewed: Yes     ** Please Note: "This note has been constructed using a voice recognition system  Therefore there may be syntax, spelling, and/or grammatical errors   Please call if you have any questions  "**

## 2022-04-13 NOTE — ASSESSMENT & PLAN NOTE
Lab Results   Component Value Date    EGFR 20 04/13/2022    EGFR 20 04/03/2022    EGFR 30 04/01/2022    CREATININE 2 27 (H) 04/13/2022    CREATININE 2 27 (H) 04/03/2022    CREATININE 1 65 (H) 04/01/2022     Multifactorial   Possible secondary to dehydration from diuretic and hypotension, also possible etiology could be secondary to postrenal given the fact patient mentioned that she has been noticing difficulty emptying her bladder  Patient did admit to drinking enough fluids at home though  We will check a bladder scan and evaluate the patient  If tomorrow a m  BMP not improve creatinine, we will consider getting a renal ultrasound  Consult Nephrology for further evaluation given the fact patient has CKD  Hold patient's antihypertensive medications other than metoprolol  Hold diuretic

## 2022-04-13 NOTE — PROGRESS NOTES
Review of Systems      Genitourinary need to urinate more than twice a night   Cardiology none   Gastrointestinal none   Neurology none   Constitutional weight change   Integumentary none   Psychiatry none   Musculoskeletal joint pain, back pain and legs twitching/jerking   Pulmonary none   ENT throat clearing   Endocrine frequent urination   Hematological none

## 2022-04-13 NOTE — ASSESSMENT & PLAN NOTE
Hypotension possible secondary to multiple antihypertensive medications  However patient has been on these antihypertensive medications for prolonged period of time  Patient's urinalysis was inconclusive, 1 being positive the other negative  We will repeat 1 more urinalysis with straight catheterization  Patient already received a dose of antibiotic in the ED  With IV fluid resuscitation patient blood pressure is better  We will consider continue antibiotic if urinalysis positive in the straight cath urine  However patient did not have any fevers at home  No chills or rigors are no CVA or flank pain

## 2022-04-13 NOTE — ASSESSMENT & PLAN NOTE
Lab Results   Component Value Date    EGFR 20 04/13/2022    EGFR 20 04/03/2022    EGFR 30 04/01/2022    CREATININE 2 27 (H) 04/13/2022    CREATININE 2 27 (H) 04/03/2022    CREATININE 1 65 (H) 04/01/2022     Currently running hypotensive and hence we will hold antihypertensive medications other than metoprolol

## 2022-04-13 NOTE — PROGRESS NOTES
Progress Note - Sleep Medicine  Chance Todd 66 y o  female MRN: [id number]       Impression & Plan:   71 y o  F with PMHx of DM, HTN, Hyperlipidemia and hiatal hernia s/p repair who comes in for follow up with recent respiratory failure requiring mechanical ventilation due to aspiration, BARBARA s/p UPPP on CPAP who comes in for Inspire initiation    1  Hypotension and Bradycardia - normal /95,  Now 93/55  She is pale, notes significant fatigue as well  While she did have new BP medication started by Dr Lucinda Garcia, she does appear acutely ill at this point with palor and fatigue  Therefore, I recommend that she goes to the ER  -  I will place an ADT21 order to help facilitate the evaluation  2   Moderate BARBARA (AHI - 19 5) who previously failed autoCPAP s/p implantation of Inspire on 11/10/21  Here for device activation       -  Device activation was activated at last visit  She underwent her sleep study on 3/24/22  She states that she is still struggling with use  We will reschedule her to discuss and evaluate it further when her BP is better mangaed  -  Will set up for follow up in 2 week  -  I discussed in depth the risk of leaving sleep apnea untreated including hypertension, heart failure, arrhythmia, MI and stroke      3   RLS and significant neuropathy -  she states that this this is stable          -  Continue Neurontin 100mg PO at bedtime          -  Continue Mirapex 0 25 qHS   I renewed the dose today      4   Mild pulmonary hypertension/RV hypertrophy on echo -  follow echo after optimizing treatment     ______________________________________________________________________    HPI:    Chance Todd presents today for follow-up for BARBARA  However, she came in and feels run down, weak and significant fatigue  She also mentions some lightheadedness and dizziness            Review of Systems:  Review of Systems  Genitourinary need to urinate more than twice a night Cardiology none   Gastrointestinal none   Neurology none   Constitutional weight change   Integumentary none   Psychiatry none   Musculoskeletal joint pain, back pain and legs twitching/jerking   Pulmonary none   ENT throat clearing   Endocrine frequent urination   Hematological none         Social history updates:  Social History     Tobacco Use   Smoking Status Former Smoker    Packs/day: 2 00    Years: 10 00    Pack years: 20 00    Types: Cigarettes    Start date: 1/10/1968   Hermila Moreno Quit date: 12    Years since quittin 4   Smokeless Tobacco Former User    Quit date: 1976   Tobacco Comment    Smoked 2 pack a day     Social History     Socioeconomic History    Marital status: /Civil Union     Spouse name: Not on file    Number of children: Not on file    Years of education: Not on file    Highest education level: Not on file   Occupational History    Occupation: RETIRED   Tobacco Use    Smoking status: Former Smoker     Packs/day: 2 00     Years: 10 00     Pack years: 20 00     Types: Cigarettes     Start date: 1/10/1968     Quit date:      Years since quittin 2    Smokeless tobacco: Former User     Quit date: 1976    Tobacco comment: Smoked 2 pack a day   Vaping Use    Vaping Use: Never used   Substance and Sexual Activity    Alcohol use: Not Currently     Alcohol/week: 0 0 standard drinks     Comment: 1 or 2 a year    Drug use: No    Sexual activity: Not Currently     Partners: Male     Birth control/protection: Post-menopausal, Female Sterilization     Comment: defer   Other Topics Concern    Not on file   Social History Narrative    Not on file     Social Determinants of Health     Financial Resource Strain: Not on file   Food Insecurity: Not on file   Transportation Needs: Not on file   Physical Activity: Not on file   Stress: Not on file   Social Connections: Not on file   Intimate Partner Violence: Not on file   Housing Stability: Not on file PhysicalExamination:  Vitals:   BP 93/55 (BP Location: Left arm, Patient Position: Sitting, Cuff Size: Adult)   Pulse 57   Ht 5' 3" (1 6 m)   Wt 74 8 kg (165 lb)   SpO2 96%   BMI 29 23 kg/m²   General: Pale, ill appearing, Awake alert and oriented x 3, conversant without conversational dyspnea, NAD, normal affect  HEENT:  PERRL, Sclera noninjected, nonicteric OU, Nares patent,  no craniofacial abnormalities, Mucous membranes, moist, no oral lesions, normal dentition, Mallampati class 3  NECK: Trachea midline, no accessory muscle use, no stridor, no cervical or supraclavicular adenopathy, JVP not elevated  CARDIAC: Reg, single s1/S2, no m/r/g  PULM: CTA bilaterally no wheezing, rhonchi or rales  ABD: Normoactive bowel sounds, soft nontender, nondistended, no rebound, no rigidity, no guarding  EXT: No cyanosis, no clubbing, no edema, normal capillary refill  NEURO: no focal neurologic deficits, AAOx3, moving all extremities appropriately      Diagnostic Data:  Labs: I personally reviewed the most recent laboratory data pertinent to today's visit  not applicable    I have personally reviewed pertinent lab results  Lab Results   Component Value Date    WBC 19 50 (H) 04/05/2022    HGB 12 2 04/05/2022    HCT 36 2 04/05/2022    MCV 89 04/05/2022     04/05/2022     Lab Results   Component Value Date    GLUCOSE 168 (H) 11/30/2018    CALCIUM 9 2 04/03/2022    K 4 0 04/03/2022    CO2 27 04/03/2022    CL 99 (L) 04/03/2022    BUN 22 04/03/2022    CREATININE 2 27 (H) 04/03/2022     No results found for: IGE  Lab Results   Component Value Date    ALT 84 (H) 04/03/2022    AST 50 (H) 04/03/2022    ALKPHOS 90 04/03/2022        PFTs:  The most recent pulmonary function tests were reviewed  PFT with post bronchodilator 10/30/20  Study Interpretation:   · Reduced total lung capacity at 77% predicted with normal residual volume  · Normal airflow on vital capacity    · No significant improvement in airflow or vital capacity following the administration of bronchodilators  · Reduced diffusion capacity  · Normal airway resistance as indicated by the specific airway conductance      Koffi in office 8/2/19  Prebronchodilator  FVC - 2 29 (79%)  FEV1 - 2 20 (83%)  FEV1/FVC - 76%     Post bronchodilator  FVC - 2 37 (82%)  FEV1 - 1 86 (84%)  FEV1/FVC -  78%  Very mild restriction      6 minute walk 8/2/19  Starting saturation - 94%   Starting HR - 74 bpm  Lowest saturation - 90%    Highest HR - 94 bpm  Ambulated - 252 m without the need for oxygen     Imaging  I personally reviewed the images on the [facility] system pertinent to today's visit  CT chest - 10/30/20  IMPRESSION:  Mild scarring/atelectasis in the lower lobes as mentioned above   No significant interstitial thickening or honeycombing or fibrotic changes are seen  No evidence of aspiration at this time  Hiatal hernia     Cardiac:  SUMMARY  LEFT VENTRICLE:  Systolic function was normal  Ejection fraction was estimated in the range of 55 %  Although no diagnostic regional wall motion abnormality was identified, this possibility cannot be completely excluded on the basis of this study  LEFT ATRIUM:  The atrium was dilated  MITRAL VALVE:  There was trace regurgitation      Sleep studies:  Diagnostic: 2011 -  AHI 13 1, severe hypoxia (sat 66%)     CPAP 5/8/19  Mrs Fuentes underwent a titration of CPAP to 10 cc of H2O pressure  She did well at 9 cc of H2O pressure in REM when in  the nonsupine position  However, she did not sleep much in the supine position  Therefore, I recommend a trial of autotitrating  CPAP 9 -20 cc of H2O pressure with Cflex 3 and heated humidification using a small Resmed Airfit F20  Compliance should be evaluated in 1-2 months  In addition, she had an increased number of limb movements (PLM index 35 7)   If she still has daytime sleepiness, I would  recommend pharmacologic treatment with Neurontin, Lyrica, Mirapex or Requip     Diagnostic study 3/30/21  (AHI) of 19 5 events per hour of sleep   The AHI in the supine position was 21 6   The AHI during REM sleep was 69 5       Compliance Data:  Type of CPAP: CPAP 10 - 20, mean 10 5, peak 18 5                                   8/17/20 - 11/17/20                                   Percent usage: 97%, > 4 hrs 82%                                   Average time used: 5 hrs 51 mins, up to 9 hrs 52 mins                                  Time in large leak: 2 mins 12 seconds                                   Residual AHI: 5 7     Compliance Data:  Type of CPAP: CPAP 10 - 20, mean 10 2, peak 12 5                                   6/27/20 - 7/26/20                                   Percent usage: 50%, > 4 hrs 26%                                   Average time used: 2 hrs 29 mins, up to 9 hrs 2 mins                                  Time in large leak: 12 mins 16 seconds                                   Residual AHI: 3 2     Compliance Data:  Type of CPAP: CPAP 10 - 20                                    8/17/19 - 11/14/19, mean 10 5, Max 17 1                                   Percent usage: 100%, > 4 hrs 83%                                   Average time used: 5 hrs 48 mins, up to 9 hrs 27 mins                                  Time in large leak: 3 mins 8 seconds                                   Residual AHI: 6 5     Compliance Data:  Type of CPAP: CPAP 9 - 20 6/29/19 - 7/28/19, mean 10 2, Max 14 1                                   Percent usage: 100%                                   Average time used: 6 hrs 22 mins, up to 8 hrs 32 mins                                  Time in large leak: 32 seconds                                   Residual AHI: 4 9        Compliance Data:  Type of CPAP: CPAP 12 3/5/19 - 4/3/19                                   Percent usage: 100%                                   Average time used: 5 hrs 40 mins, up to 8 hrs 32 mins                                  Time in large leak: 7 mins                                   Residual AHI: 2 5        Compliance Data:  Type of CPAP:  10                                   Average time used: 2 hrs                                   Residual AHI: 2 2            Arne Spatz, DO

## 2022-04-13 NOTE — ED ATTENDING ATTESTATION
4/13/2022  IYolanda MD, saw and evaluated the patient  I have discussed the patient with the resident/non-physician practitioner and agree with the resident's/non-physician practitioner's findings, Plan of Care, and MDM as documented in the resident's/non-physician practitioner's note, except where noted  All available labs and Radiology studies were reviewed  I was present for key portions of any procedure(s) performed by the resident/non-physician practitioner and I was immediately available to provide assistance  At this point I agree with the current assessment done in the Emergency Department  I have conducted an independent evaluation of this patient a history and physical is as follows: This is a 66 y o  old female who presents to the ED for evaluation of weakness  Recent admit for diarrhea and UTI, found to be allergic to bactrim, taken off bactrim, DC without other abx  Worsening weakness, pallor per family  Feels thirsty  Hypotensive in [facility] and OP office that sent her in      VS and nursing notes reviewed  General: Appears in NAD, awake, alert, speaking normally in full sentences  Well-nourished, well-developed  Appears stated age  Head: Normocephalic, atraumatic  Eyes: EOMI  Vision grossly normal  No subconjunctival hemorrhages or occular discharge noted  Symmetrical lids  ENT: Atraumatic external nose and ears  No stridor  Normal phonation  No drooling  Normal swallowing  Neck: No JVD  FROM  No goiter  CV: No pallor  Normal rate  Lungs: No tachypnea  No respiratory distress  MSK: Moving all extremities equally, no peripheral edema  Skin: Dry, intact  No cyanosis  Pale  Neuro: Awake, alert, GCS15  CN II-XII grossly intact  Grossly normal gait  Psychiatric/Behavioral: Appropriate mood and affect  A/P: This is a 66 y o  female who presents to the ED for evaluation of hypotension  Suspect this is infection related  Give hypotension, will admit, continue ABX   Reevaluate and dispo accordingly  Patient markedly improved with IV fluid bolus  Will admit to SLIM      ED Course         Critical Care Time  CriticalCare Time  Performed by: Cee Partida MD  Authorized by: Cee Partida MD     Critical care provider statement:     Critical care time (minutes):  31    Critical care was necessary to treat or prevent imminent or life-threatening deterioration of the following conditions:  Dehydration    Critical care was time spent personally by me on the following activities:  Obtaining history from patient or surrogate, development of treatment plan with patient or surrogate, examination of patient, evaluation of patient's response to treatment, review of old charts, re-evaluation of patient's condition, ordering and review of laboratory studies and ordering and performing treatments and interventions

## 2022-04-13 NOTE — ASSESSMENT & PLAN NOTE
Lab Results   Component Value Date    HGBA1C 7 4 (H) 04/05/2022       Recent Labs     04/13/22  1415 04/13/22  1823   POCGLU 101 102       Blood Sugar Average: Last 72 hrs:  (P) 101 5     Patient's Accu-Cheks have been remain low, will decrease the patient's Lantus to 20 units at bedtime and continue patient on low-dose sliding scale insulin with Accu-Chek q i d

## 2022-04-14 ENCOUNTER — TELEPHONE (OUTPATIENT)
Dept: FAMILY MEDICINE CLINIC | Facility: CLINIC | Age: 72
End: 2022-04-14

## 2022-04-14 ENCOUNTER — TELEPHONE (OUTPATIENT)
Dept: NEPHROLOGY | Facility: CLINIC | Age: 72
End: 2022-04-14

## 2022-04-14 ENCOUNTER — TRANSITIONAL CARE MANAGEMENT (OUTPATIENT)
Dept: FAMILY MEDICINE CLINIC | Facility: CLINIC | Age: 72
End: 2022-04-14

## 2022-04-14 VITALS
RESPIRATION RATE: 18 BRPM | SYSTOLIC BLOOD PRESSURE: 131 MMHG | HEART RATE: 60 BPM | DIASTOLIC BLOOD PRESSURE: 71 MMHG | HEIGHT: 63 IN | TEMPERATURE: 97.9 F | WEIGHT: 170.19 LBS | BODY MASS INDEX: 30.16 KG/M2 | OXYGEN SATURATION: 95 %

## 2022-04-14 DIAGNOSIS — N18.4 STAGE 4 CHRONIC KIDNEY DISEASE (HCC): Primary | ICD-10-CM

## 2022-04-14 PROBLEM — N39.0 UTI (URINARY TRACT INFECTION): Status: ACTIVE | Noted: 2022-04-14

## 2022-04-14 LAB
ANION GAP SERPL CALCULATED.3IONS-SCNC: 11 MMOL/L (ref 4–13)
ATRIAL RATE: 60 BPM
BASOPHILS # BLD AUTO: 0.04 THOUSANDS/ΜL (ref 0–0.1)
BASOPHILS NFR BLD AUTO: 0 % (ref 0–1)
BUN SERPL-MCNC: 71 MG/DL (ref 5–25)
CALCIUM SERPL-MCNC: 8.9 MG/DL (ref 8.3–10.1)
CHLORIDE SERPL-SCNC: 100 MMOL/L (ref 100–108)
CO2 SERPL-SCNC: 24 MMOL/L (ref 21–32)
CREAT SERPL-MCNC: 2.16 MG/DL (ref 0.6–1.3)
EOSINOPHIL # BLD AUTO: 0.21 THOUSAND/ΜL (ref 0–0.61)
EOSINOPHIL NFR BLD AUTO: 2 % (ref 0–6)
ERYTHROCYTE [DISTWIDTH] IN BLOOD BY AUTOMATED COUNT: 14.3 % (ref 11.6–15.1)
GFR SERPL CREATININE-BSD FRML MDRD: 22 ML/MIN/1.73SQ M
GLUCOSE SERPL-MCNC: 184 MG/DL (ref 65–140)
GLUCOSE SERPL-MCNC: 208 MG/DL (ref 65–140)
HCT VFR BLD AUTO: 37.1 % (ref 34.8–46.1)
HGB BLD-MCNC: 12.4 G/DL (ref 11.5–15.4)
IMM GRANULOCYTES # BLD AUTO: 0.07 THOUSAND/UL (ref 0–0.2)
IMM GRANULOCYTES NFR BLD AUTO: 1 % (ref 0–2)
LYMPHOCYTES # BLD AUTO: 2.62 THOUSANDS/ΜL (ref 0.6–4.47)
LYMPHOCYTES NFR BLD AUTO: 21 % (ref 14–44)
MCH RBC QN AUTO: 30.3 PG (ref 26.8–34.3)
MCHC RBC AUTO-ENTMCNC: 33.4 G/DL (ref 31.4–37.4)
MCV RBC AUTO: 91 FL (ref 82–98)
MONOCYTES # BLD AUTO: 0.64 THOUSAND/ΜL (ref 0.17–1.22)
MONOCYTES NFR BLD AUTO: 5 % (ref 4–12)
NEUTROPHILS # BLD AUTO: 8.99 THOUSANDS/ΜL (ref 1.85–7.62)
NEUTS SEG NFR BLD AUTO: 71 % (ref 43–75)
NRBC BLD AUTO-RTO: 0 /100 WBCS
P AXIS: 64 DEGREES
PLATELET # BLD AUTO: 286 THOUSANDS/UL (ref 149–390)
PMV BLD AUTO: 11.2 FL (ref 8.9–12.7)
POTASSIUM SERPL-SCNC: 4.6 MMOL/L (ref 3.5–5.3)
PR INTERVAL: 256 MS
QRS AXIS: 12 DEGREES
QRSD INTERVAL: 84 MS
QT INTERVAL: 434 MS
QTC INTERVAL: 434 MS
RBC # BLD AUTO: 4.09 MILLION/UL (ref 3.81–5.12)
SODIUM SERPL-SCNC: 135 MMOL/L (ref 136–145)
T WAVE AXIS: 64 DEGREES
VENTRICULAR RATE: 60 BPM
WBC # BLD AUTO: 12.57 THOUSAND/UL (ref 4.31–10.16)

## 2022-04-14 PROCEDURE — 99239 HOSP IP/OBS DSCHRG MGMT >30: CPT | Performed by: PHYSICIAN ASSISTANT

## 2022-04-14 PROCEDURE — 85025 COMPLETE CBC W/AUTO DIFF WBC: CPT | Performed by: PHYSICIAN ASSISTANT

## 2022-04-14 PROCEDURE — 93010 ELECTROCARDIOGRAM REPORT: CPT | Performed by: INTERNAL MEDICINE

## 2022-04-14 PROCEDURE — 80048 BASIC METABOLIC PNL TOTAL CA: CPT | Performed by: PHYSICIAN ASSISTANT

## 2022-04-14 PROCEDURE — 82948 REAGENT STRIP/BLOOD GLUCOSE: CPT

## 2022-04-14 PROCEDURE — 99223 1ST HOSP IP/OBS HIGH 75: CPT | Performed by: INTERNAL MEDICINE

## 2022-04-14 RX ORDER — LEVOTHYROXINE SODIUM 112 UG/1
112 TABLET ORAL
Status: DISCONTINUED | OUTPATIENT
Start: 2022-04-15 | End: 2022-04-14 | Stop reason: HOSPADM

## 2022-04-14 RX ORDER — ACETAMINOPHEN 325 MG/1
650 TABLET ORAL EVERY 6 HOURS PRN
Refills: 0
Start: 2022-04-14

## 2022-04-14 RX ORDER — DOXYCYCLINE HYCLATE 100 MG/1
100 CAPSULE ORAL EVERY 12 HOURS SCHEDULED
Qty: 8 CAPSULE | Refills: 0 | Status: SHIPPED | OUTPATIENT
Start: 2022-04-14 | End: 2022-04-18

## 2022-04-14 RX ORDER — AMLODIPINE BESYLATE 5 MG/1
5 TABLET ORAL DAILY
Status: DISCONTINUED | OUTPATIENT
Start: 2022-04-15 | End: 2022-04-14 | Stop reason: HOSPADM

## 2022-04-14 RX ORDER — AMLODIPINE BESYLATE 5 MG/1
5 TABLET ORAL DAILY
Status: DISCONTINUED | OUTPATIENT
Start: 2022-04-14 | End: 2022-04-14

## 2022-04-14 RX ADMIN — LEVOTHYROXINE SODIUM 125 MCG: 125 TABLET ORAL at 06:41

## 2022-04-14 RX ADMIN — METOPROLOL TARTRATE 50 MG: 50 TABLET, FILM COATED ORAL at 08:35

## 2022-04-14 RX ADMIN — DULOXETINE HYDROCHLORIDE 40 MG: 20 CAPSULE, DELAYED RELEASE ORAL at 08:36

## 2022-04-14 RX ADMIN — Medication 2000 UNITS: at 08:35

## 2022-04-14 RX ADMIN — PANTOPRAZOLE SODIUM 40 MG: 40 TABLET, DELAYED RELEASE ORAL at 06:41

## 2022-04-14 RX ADMIN — INSULIN LISPRO 1 UNITS: 100 INJECTION, SOLUTION INTRAVENOUS; SUBCUTANEOUS at 08:36

## 2022-04-14 RX ADMIN — B-COMPLEX W/ C & FOLIC ACID TAB 1 TABLET: TAB at 08:35

## 2022-04-14 RX ADMIN — OMEGA-3 FATTY ACIDS CAP 1000 MG 1000 MG: 1000 CAP at 08:35

## 2022-04-14 NOTE — ASSESSMENT & PLAN NOTE
Lab Results   Component Value Date    HGBA1C 7 4 (H) 04/05/2022       Recent Labs     04/13/22  1415 04/13/22  1823 04/13/22  2154 04/14/22  0716   POCGLU 101 102 213* 184*       Blood Sugar Average: Last 72 hrs:  (P) 150   · A1c very close to goal at 7 4  Continue home insulin regimen and diabetic diet    Aim for euglycemia to prevent urinary tract infections

## 2022-04-14 NOTE — TELEPHONE ENCOUNTER
----- Message from Lydia Arce MD sent at 4/14/2022  9:44 AM EDT -----  This patient was admitted to [facility] for low blood pressure  She will only be sent home on metoprolol/amlodipine 5 mg daily/torsemide 20 mg daily    Recommend:  1  She will monitor blood pressure when she is away on vacation next week and call us with a couple of readings  2  She knows to check a week a blood pressure readings when she comes back the following week  3  She needs a basic metabolic profile when she comes back the following week!       Thank you

## 2022-04-14 NOTE — TELEPHONE ENCOUNTER
Patient called and left a message to schedule her follow up appointment for after being in the hospital, please call to schedule and do intake for TCM

## 2022-04-14 NOTE — PLAN OF CARE
Problem: PAIN - ADULT  Goal: Verbalizes/displays adequate comfort level or baseline comfort level  Description: Interventions:  - Encourage patient to monitor pain and request assistance  - Assess pain using appropriate pain scale  - Administer analgesics based on type and severity of pain and evaluate response  - Implement non-pharmacological measures as appropriate and evaluate response  - Consider cultural and social influences on pain and pain management  - Notify physician/advanced practitioner if interventions unsuccessful or patient reports new pain  Outcome: Adequate for Discharge     Problem: INFECTION - ADULT  Goal: Absence or prevention of progression during hospitalization  Description: INTERVENTIONS:  - Assess and monitor for signs and symptoms of infection  - Monitor lab/diagnostic results  - Monitor all insertion sites, i e  indwelling lines, tubes, and drains  - Monitor endotracheal if appropriate and nasal secretions for changes in amount and color  - [place] appropriate cooling/warming therapies per order  - Administer medications as ordered  - Instruct and encourage patient and family to use good hand hygiene technique  - Identify and instruct in appropriate isolation precautions for identified infection/condition  Outcome: Adequate for Discharge  Goal: Absence of fever/infection during neutropenic period  Description: INTERVENTIONS:  - Monitor WBC    Outcome: Adequate for Discharge     Problem: SAFETY ADULT  Goal: Patient will remain free of falls  Description: INTERVENTIONS:  - Educate patient/family on patient safety including physical limitations  - Instruct patient to call for assistance with activity   - Consult OT/PT to assist with strengthening/mobility   - Keep Call bell within reach  - Keep bed low and locked with side rails adjusted as appropriate  - Keep care items and personal belongings within reach  - Initiate and maintain comfort rounds  - Make Fall Risk Sign visible to staff  - Offer Toileting every  Hours, in advance of need  - Initiate/Maintain alarm  - Obtain necessary fall risk management equipment:   - Apply yellow socks and bracelet for high fall risk patients  - Consider moving patient to room near nurses station  Outcome: Adequate for Discharge  Goal: Maintain or return to baseline ADL function  Description: INTERVENTIONS:  -  Assess patient's ability to carry out ADLs; assess patient's baseline for ADL function and identify physical deficits which impact ability to perform ADLs (bathing, care of mouth/teeth, toileting, grooming, dressing, etc )  - Assess/evaluate cause of self-care deficits   - Assess range of motion  - Assess patient's mobility; develop plan if impaired  - Assess patient's need for assistive devices and provide as appropriate  - Encourage maximum independence but intervene and supervise when necessary  - Involve family in performance of ADLs  - Assess for home care needs following discharge   - Consider OT consult to assist with ADL evaluation and planning for discharge  - Provide patient education as appropriate  Outcome: Adequate for Discharge  Goal: Maintains/Returns to pre admission functional level  Description: INTERVENTIONS:  - Perform BMAT or MOVE assessment daily    - Set and communicate daily mobility goal to care team and patient/family/caregiver  - Collaborate with rehabilitation services on mobility goals if consulted  - Perform Range of Motion  times a day  - Reposition patient every  hours    - Dangle patient  times a day  - Stand patient  times a day  - Ambulate patient  times a day  - Out of bed to chair  times a day   - Out of bed for meals times a day  - Out of bed for toileting  - Record patient progress and toleration of activity level   Outcome: Adequate for Discharge     Problem: DISCHARGE PLANNING  Goal: Discharge to home or other facility with appropriate resources  Description: INTERVENTIONS:  - Identify barriers to discharge w/patient and caregiver  - Arrange for needed discharge resources and transportation as appropriate  - Identify discharge learning needs (meds, wound care, etc )  - Arrange for interpretive services to assist at discharge as needed  - Refer to Case Management Department for coordinating discharge planning if the patient needs post-hospital services based on physician/advanced practitioner order or complex needs related to functional status, cognitive ability, or social support system  Outcome: Adequate for Discharge     Problem: Knowledge Deficit  Goal: Patient/family/caregiver demonstrates understanding of disease process, treatment plan, medications, and discharge instructions  Description: Complete learning assessment and assess knowledge base    Interventions:  - Provide teaching at level of understanding  - Provide teaching via preferred learning methods  Outcome: Adequate for Discharge     Problem: Potential for Falls  Goal: Patient will remain free of falls  Description: INTERVENTIONS:  - Educate patient/family on patient safety including physical limitations  - Instruct patient to call for assistance with activity   - Consult OT/PT to assist with strengthening/mobility   - Keep Call bell within reach  - Keep bed low and locked with side rails adjusted as appropriate  - Keep care items and personal belongings within reach  - Initiate and maintain comfort rounds  - Make Fall Risk Sign visible to staff  - Offer Toileting every  Hours, in advance of need  - Initiate/Maintain alarm  - Obtain necessary fall risk management equipment:   - Apply yellow socks and bracelet for high fall risk patients  - Consider moving patient to room near nurses station  Outcome: Adequate for Discharge

## 2022-04-14 NOTE — ASSESSMENT & PLAN NOTE
Lab Results   Component Value Date    EGFR 22 04/14/2022    EGFR 20 04/13/2022    EGFR 20 04/03/2022    CREATININE 2 16 (H) 04/14/2022    CREATININE 2 27 (H) 04/13/2022    CREATININE 2 27 (H) 04/03/2022     · Patient follows with Nephrology outpatient with baseline creatinine range 1 6-2 0  Upon presentation her creatinine was 2 2, which is elevated but not meeting threshold for acute kidney injury  This was likely multifactorial due to volume depletion and hypotension as well as urinary tract infection  · Greatly appreciate involvement from Nephrology regarding blood pressure medication management as noted above  · Creatinine today improved to 2  16    Patient will have outpatient follow-up labs with her regular nephrologist

## 2022-04-14 NOTE — DISCHARGE SUMMARY
[facility]  Discharge- Hope Gutierrez 1950, 66 y o  female MRN: [id number]  Unit/Bed#: W -39 Encounter: [id number]  Primary Care Provider: Lien Parra MD   Date and time admitted to hospital: 4/13/2022  1:12 PM    * Hypotension  Assessment & Plan  · Patient on multiple antihypertensive agents, just recently placed on spironolactone who presented to the hospital with hypotension  All of her medications were held aside from metoprolol  · Greatly appreciate involvement from her nephrologist with whom I have discussed the case in detail today  The plan moving forward with her antihypertensive agents will be to resume her Norvasc today, added on her torsemide tomorrow, completely discontinue spironolactone and aim to resume her Benicar in the future to prevent proteinuria  We will also maintain her off hydralazine for now  · She will continue to monitor her blood pressures at home, which have been now stable since admission      UTI (urinary tract infection)  Assessment & Plan  · Patient with mild leukocytosis and complaints of dysuria, with multifactorial hypotension prior to arrival but no other convincing evidence of sepsis  · Urine culture with glitter cells and bacteria but low WBC count, however in the context of dysuria do suspect this is true urinary tract infection  · Unfortunately based on her allergies and history of multidrug resistant organisms, limited options for antibiotic treatment noted  Currently has received 1 dose of IV ceftriaxone  Reviewed remotely with Infectious Disease and will prescribe doxycycline 100 mg twice a day to complete a 5 day course  · Encourage increased fluid intake    Bladder scan requested to assess for retention    Stage 4 chronic kidney disease [facility])  Assessment & Plan  Lab Results   Component Value Date    EGFR 22 04/14/2022    EGFR 20 04/13/2022    EGFR 20 04/03/2022    CREATININE 2 16 (H) 04/14/2022    CREATININE 2 27 (H) 04/13/2022    CREATININE 2 27 (H) 04/03/2022     · Patient follows with Nephrology outpatient with baseline creatinine range 1 6-2 0  Upon presentation her creatinine was 2 2, which is elevated but not meeting threshold for acute kidney injury  This was likely multifactorial due to volume depletion and hypotension as well as urinary tract infection  · Greatly appreciate involvement from Nephrology regarding blood pressure medication management as noted above  · Creatinine today improved to 2  16  Patient will have outpatient follow-up labs with her regular nephrologist    Type 2 diabetes mellitus with diabetic nephropathy, with long-term current use of insulin [facility])  Assessment & Plan  Lab Results   Component Value Date    HGBA1C 7 4 (H) 04/05/2022       Recent Labs     04/13/22  1415 04/13/22  1823 04/13/22  2154 04/14/22  0716   POCGLU 101 102 213* 184*       Blood Sugar Average: Last 72 hrs:  (P) 150   · A1c very close to goal at 7 4  Continue home insulin regimen and diabetic diet    Aim for euglycemia to prevent urinary tract infections    Discharging Physician / Practitioner: Ignacio Treviño PA-C  PCP: Keanu Mendez MD  Admission Date:   Admission Orders (From admission, onward)     Ordered        04/13/22 1525  Inpatient Admission  Once                      Discharge Date: 04/14/22    Medical Problems             Resolved Problems  Date Reviewed: 4/14/2022    None                Consultations During Hospital Stay:  · Nephrology    Procedures Performed:   · Bladder scan   · Chest x-ray no acute disease    Significant Findings / Test Results:   · As above    Incidental Findings:   · None     Test Results Pending at Discharge (will require follow up):   · Urine culture/sensitivity final results     Outpatient Tests Requested:  · BMP    Complications:  None    Reason for Admission:  Low blood pressure    Hospital Course:     Ronda Pruitt is a 66 y o  female patient who originally presented to the hospital on 4/13/2022 due to hypotension at noted at her routine pulmonology appointment  Patient at that time was complaining of feeling fatigued and based on the low blood pressure her pulmonologist center to the ER for evaluation especially in light of additional complaints of difficulty emptying her bladder and burning dysuria  Upon admission to the hospital her systolic blood pressure was 96/55 and she received IV fluids  Her antihypertensives were held with the exception of her beta-blocker and her blood pressure immediately stabilized  She was also noted to have a mild rise in her creatinine  She was seen in consultation by her personal nephrologist who assisted in decision making regarding her antihypertensive regimen moving forward  In addition her creatinine today improved slightly to 2 1  Regarding her dysuria, patient had just been treated with Bactrim for suspected urinary tract infection 2 weeks ago but had an allergic reaction to it  Based on her allergy profile and history of multidrug resistant organisms, she received IV ceftriaxone but was very eager for discharge today as she has a vacation coming up tomorrow  As such we reviewed the case remotely with Infectious Disease and felt that it would be reasonable to discharge her on doxycycline though have also asked her family doctor to follow-up on the final results  Patient is comfortable and understands that these results are not in but is in agreement of the plan to proceed with discharge on doxycycline  She is feeling well and will be discharged home today with medication changes as listed    The patient, initially admitted to the hospital as inpatient, was discharged earlier than expected given the following: Stabilization of blood pressure and creatinine   Please see above list of diagnoses and related plan for additional information       Condition at Discharge: stable     Discharge Day Visit / Exam:     Subjective:  Patient reports she feels well today and is very eager to go home as she has a planned vacation to [place] for tomorrow  She denies any shortness of breath or dizziness and is happy that her blood pressure is stable  She is eating and drinking well  She has not had any vaginal discharge or bleeding, nor any fever or chills  She states that initially due to the burning she has been having for the past 2 weeks she thought perhaps she had a yeast infection and she used an over-the-counter yeast treatment, but symptoms did not resolve  She denies any suprapubic pain though she does continue to have mild intermittent sense of burning and difficulty passing urine  Vitals: Blood Pressure: 131/71 (04/14/22 0717)  Pulse: 60 (04/14/22 0717)  Temperature: 97 9 °F (36 6 °C) (04/14/22 0717)  Temp Source: Oral (04/13/22 2119)  Respirations: 18 (04/14/22 0717)  Height: 5' 3" (160 cm) (04/13/22 1311)  Weight - Scale: 77 2 kg (170 lb 3 1 oz) (04/13/22 1648)  SpO2: 95 % (04/14/22 0717)  Exam:   Physical Exam  Vitals reviewed  Constitutional:       General: She is not in acute distress  Appearance: Normal appearance  She is obese  She is not ill-appearing, toxic-appearing or diaphoretic  Comments: Comfortable and nontoxic appearing   HENT:      Head: Normocephalic  Eyes:      General: No scleral icterus  Right eye: No discharge  Left eye: No discharge  Conjunctiva/sclera: Conjunctivae normal    Cardiovascular:      Rate and Rhythm: Normal rate and regular rhythm  Heart sounds: No murmur heard  Pulmonary:      Effort: No respiratory distress  Breath sounds: Normal breath sounds  No stridor  No wheezing or rhonchi  Abdominal:      General: Bowel sounds are normal  There is no distension  Palpations: Abdomen is soft  Tenderness: There is no abdominal tenderness  There is no guarding     Genitourinary:     Comments: Very minimal vulvar erythema with no clear evidence of discharge or convincing evidence of yeast infection  Musculoskeletal:         General: No swelling, tenderness, deformity or signs of injury  Right lower leg: No edema  Left lower leg: No edema  Skin:     General: Skin is warm and dry  Coloration: Skin is not jaundiced or pale  Findings: No bruising, erythema, lesion or rash  Neurological:      General: No focal deficit present  Mental Status: She is alert  Mental status is at baseline  Comments: Awake alert interactive she is an excellent historian with no confusion or tremor   Psychiatric:         Mood and Affect: Mood normal          Thought Content: Thought content normal          Discussion with Family:  Updated patient's  over the phone    Discharge instructions/Information to patient and family:   See after visit summary for information provided to patient and family  Provisions for Follow-Up Care:  See after visit summary for information related to follow-up care and any pertinent home health orders  Disposition:  Home    Planned Readmission:  None     Discharge Statement:  I spent 35 minutes discharging the patient  This time was spent on the day of discharge  I had direct contact with the patient on the day of discharge  Greater than 50% of the total time was spent examining patient, answering all patient questions, arranging and discussing plan of care with patient as well as directly providing post-discharge instructions  Additional time then spent on discharge activities  Case was discussed with case management and Nephrology  Bedside nursing rounds performed    Discharge Medications:  See after visit summary for reconciled discharge medications provided to patient and family        ** Please Note: This note has been constructed using a voice recognition system **

## 2022-04-14 NOTE — ASSESSMENT & PLAN NOTE
· Patient on multiple antihypertensive agents, just recently placed on spironolactone who presented to the hospital with hypotension  All of her medications were held aside from metoprolol  · Greatly appreciate involvement from her nephrologist with whom I have discussed the case in detail today  The plan moving forward with her antihypertensive agents will be to resume her Norvasc today, added on her torsemide tomorrow, completely discontinue spironolactone and aim to resume her Benicar in the future to prevent proteinuria    We will also maintain her off hydralazine for now  · She will continue to monitor her blood pressures at home, which have been now stable since admission

## 2022-04-14 NOTE — ASSESSMENT & PLAN NOTE
· Patient with mild leukocytosis and complaints of dysuria, with multifactorial hypotension prior to arrival but no other convincing evidence of sepsis  · Urine culture with glitter cells and bacteria but low WBC count, however in the context of dysuria do suspect this is true urinary tract infection  · Unfortunately based on her allergies and history of multidrug resistant organisms, limited options for antibiotic treatment noted  Currently has received 1 dose of IV ceftriaxone  Reviewed remotely with Infectious Disease and will prescribe doxycycline 100 mg twice a day to complete a 5 day course  · Encourage increased fluid intake    Bladder scan requested to assess for retention

## 2022-04-14 NOTE — CONSULTS
Consultation - Nephrology   [facility] 66 y o  female MRN: [id number]  Unit/Bed#: W -73 Encounter: [id number]    Inpatient consult to Nephrology  Consult performed by: Halie Geller MD  Consult ordered by: Linda Morris MD          History of Present Illness   Physician Requesting Consult: Kofi Lozano DO  Reason for Consult / Principal Problem:  Hypotension/history of hypertension/CKD      HPI: [facility] is a 66y o  year old female with PMH of CKD stage 3/diabetes mellitus type 2/hypertension/pulmonary hypertension/fibromyalgia who presents with weakness for 24 hours seen by Pulmonary found to have low blood pressure in their office  Patient was admitted with a blood pressure 96/55  Typically blood pressure has been in the 845 systolic range  In February she was placed on spironolactone 3 days a week by myself  Unfortunately she did not follow up any blood pressures was admitted with urinary tract infection  Baseline creatinine 1 5-2  Admission creatinine 2 27  Now creatinine 2 16  Blood pressure meds were withheld     Now blood pressure 131/71  Patient complains of dysuria, no frequency no foamy urine no blood in the urine      History obtained from the patient/old records in the chart which I completely reviewed        Review of systems:  General:  No fevers or chills, just weak as mention  Cardiovascular:  No chest pain shortness of breath or leg swelling  Respiratory:  No wheezing or coughing  Gastrointestinal:  No nausea vomiting or diarrhea or abdominal pain  Genitourinary:  See HPI  Neuro:  No headaches, dizziness lightheadedness slurry speech or focal weakness but some mild tingling of her hands yesterday not today  All other systems were reviewed and are negative    Historical Information   Past Medical History:   Diagnosis Date    Allergic     Anesthesia     pt reports "had to use double lumen endo tube for hiatal hernia repair/so surgeon could get to where he needed to work"   Negar Leung Arthritis     Aspiration into airway     Basal cell carcinoma 2007    left cheek     BCC (basal cell carcinoma) 05/27/2021    Left Nasal tip    Cancer (HCC)     squamous cell cancer on forhead    Cancer (HCC)     basal cell on nose     Chronic kidney disease 2000, 2018    Stones, kidney disease stage 4    Chronic pain disorder     bilat feet and joint pain on occas    Colon polyp     COVID-19 08/2021    recovered at home/did receive monoclonal infusion    CPAP (continuous positive airway pressure) dependence     not using as has been recalled    Dental bridge present     Depression     Diabetes mellitus ([facility] [address] )     Type 2    Diabetic neuropathy (HCC)     Disease of thyroid gland     Family history of thyroid problem     Fatty liver     GERD (gastroesophageal reflux disease)     Heart burn     History of pneumonia     Hyperlipidemia     Hyperplasia, parathyroid ([facility] [address] )     Hypertension     Kidney problem     Kidney stone     Memory loss Julu2 2020    Motion sickness     Obesity 1978    Obesity (BMI 30 0-34  9)     Pollen allergies     RLS (restless legs syndrome)     SCC (squamous cell carcinoma) 05/04/2021    left mid forehead    Seasonal allergies     Sleep apnea     uses CPAP    Squamous cell skin cancer 2007    left cheek     Swollen ankles     Urinary tract infection 3/28/22    Wears glasses      Past Surgical History:   Procedure Laterality Date    ARTHROSCOPY KNEE      BREAST BIOPSY      stereotactic-benign    BREAST BIOPSY      stereo-benign    BREAST EXCISIONAL BIOPSY      unknown date-benign    BREAST EXCISIONAL BIOPSY      unknown date-benign    BREAST EXCISIONAL BIOPSY      unknown date-benign    BREAST EXCISIONAL BIOPSY      unknown age-benign    CHOLECYSTECTOMY      COLONOSCOPY      EXAMINATION UNDER ANESTHESIA N/A 6/24/2021    Procedure: EXAM UNDER ANESTHESIA (EUA), DISE;  Surgeon: Grace Hsieh MD;  [facility] MA[facility];  [facility]: ENT   [address]  HIATAL HERNIA REPAIR      KNEE SURGERY      Torn maniscus lap surg    LIPOSUCTION      LYMPH NODE BIOPSY      MOHS SURGERY  2021    left mid forehead-Gautam    MOHS SURGERY Left 2021    Left nasal tip- gautam     SD INCISION IMPLANT CRANIAL NERVE STIM ELECTRODE/PULSE GEN N/A 11/10/2021    Procedure: INSERTION UPPER AIRWAY STIMULATOR, INSPIRE IMPLANT;  Surgeon: Breanne Lauren MD;  Location: [facility] Main [facility];  Service: ENT    REDUCTION MAMMAPLASTY Bilateral 2000    REDUCTION MAMMAPLASTY      SKIN BIOPSY      SKIN CANCER EXCISION  2007    squamous cell carcinoma     SKIN CANCER EXCISION  2007    basal cell carcinoma    SQUAMOUS CELL CARCINOMA EXCISION      TOE SURGERY      TONSILLECTOMY      TUBAL LIGATION      UPPER GASTROINTESTINAL ENDOSCOPY       Social History   Social History     Substance and Sexual Activity   Alcohol Use Not Currently    Alcohol/week: 0 0 standard drinks    Comment: 1 or 2 a year     Social History     Substance and Sexual Activity   Drug Use No     Social History     Tobacco Use   Smoking Status Former Smoker    Packs/day: 2 00    Years: 10 00    Pack years: 20 00    Types: Cigarettes    Start date: 1/10/1968   Negar Leung Quit date: Mirza Brownlee Years since quittin 4   Smokeless Tobacco Former User    Quit date: 1976   Tobacco Comment    Smoked 2 pack a day     Family History   Problem Relation Age of Onset    Heart disease Mother     Depression Mother     Hypertension Mother     COPD Mother     Hearing loss Mother     Anxiety disorder Mother     Heart disease Father     Lung cancer Father 79        Smoker     Cancer Father         brain    Alcohol abuse Father     Dementia Father     Hypertension Father     Thyroid disease Father    Normjuanita Glory COPD Father    Normjuanita Glokaila Arthritis Father    Normjuanita Glory Brain cancer Father 76    Hypertension Sister     Diabetes Sister     Heart disease Sister     Thyroid disease Sister     Cancer Sister         Lympoma    Lung cancer Sister 78    Anxiety disorder Sister     Hypertension Brother     Diabetes Brother     Cancer Brother         Throat    Dementia Brother     Stroke Brother     Hypertension Brother     No Known Problems Son     No Known Problems Son     Heart disease Brother     Diabetes Brother     Brain cancer Paternal Aunt         unknown age       Meds/Allergies   all current active meds have been reviewed, current meds:   Current Facility-Administered Medications   Medication Dose Route Frequency    acetaminophen (TYLENOL) tablet 650 mg  650 mg Oral Q6H PRN    amLODIPine (NORVASC) tablet 5 mg  5 mg Oral Daily    atorvastatin (LIPITOR) tablet 40 mg  40 mg Oral Daily With Dinner    cefTRIAXone (ROCEPHIN) 1,000 mg in dextrose 5 % 50 mL IVPB  1,000 mg Intravenous Q24H    cholecalciferol (VITAMIN D3) tablet 2,000 Units  2,000 Units Oral Daily    diphenhydrAMINE (BENADRYL) tablet 25 mg  25 mg Oral Q6H PRN    DULoxetine (CYMBALTA) delayed release capsule 40 mg  40 mg Oral Daily    fish oil capsule 1,000 mg  1,000 mg Oral BID    gabapentin (NEURONTIN) capsule 100 mg  100 mg Oral HS    insulin detemir (LEVEMIR) subcutaneous injection 20 Units  20 Units Subcutaneous QPM    insulin lispro (HumaLOG) 100 units/mL subcutaneous injection 1-5 Units  1-5 Units Subcutaneous TID AC    insulin lispro (HumaLOG) 100 units/mL subcutaneous injection 1-5 Units  1-5 Units Subcutaneous HS    [START ON 4/15/2022] levothyroxine tablet 112 mcg  112 mcg Oral Early Morning    metoprolol tartrate (LOPRESSOR) tablet 50 mg  50 mg Oral Q12H [address]    multivitamin stress formula tablet 1 tablet  1 tablet Oral Daily    pantoprazole (PROTONIX) EC tablet 40 mg  40 mg Oral Early Morning    pramipexole (MIRAPEX) tablet 0 25 mg  0 25 mg Oral HS    and PTA meds:    Medications Prior to Admission   Medication    acetaminophen (TYLENOL) 500 mg tablet    albuterol (Ventolin HFA) 90 mcg/act inhaler    amLODIPine (NORVASC) 5 mg tablet    b complex vitamins tablet    B-D ULTRAFINE III SHORT PEN 31G X 8 MM MISC    diphenhydrAMINE (BENADRYL) 25 mg tablet    DULoxetine HCl (CYMBALTA PO)    fluticasone (FLONASE) 50 mcg/act nasal spray    gabapentin (NEURONTIN) 100 mg capsule    hydrALAZINE (APRESOLINE) 25 mg tablet    Icosapent Ethyl (Vascepa) 1 g CAPS    insulin aspart (NovoLOG) 100 units/mL injection    insulin detemir (Levemir FlexTouch) 100 Units/mL injection pen    Krill Oil (Omega-3) 500 MG CAPS    levothyroxine 112 mcg tablet    metoprolol tartrate (LOPRESSOR) 50 mg tablet    Multiple Vitamin (multivitamin) capsule    olmesartan (BENICAR) 40 mg tablet    pantoprazole (PROTONIX) 40 mg tablet    pramipexole (MIRAPEX) 0 25 mg tablet    rosuvastatin (CRESTOR) 20 MG tablet    saccharomyces boulardii (FLORASTOR) 250 mg capsule    spironolactone (ALDACTONE) 25 mg tablet    torsemide (DEMADEX) 20 mg tablet    Vitamin D, Ergocalciferol, 2000 units CAPS       Allergies   Allergen Reactions    Sulfamethoxazole-Trimethoprim Other (See Comments)     Tongue swelling    Ciprofloxacin Hives and Itching       Objective     Intake/Output Summary (Last 24 hours) at 4/14/2022 0922  Last data filed at 4/14/2022 0647  Gross per 24 hour   Intake 1338 33 ml   Output 1300 ml   Net 38 33 ml     Patient Vitals for the past 24 hrs:   BP Temp Temp src Pulse Resp SpO2 Height Weight   04/14/22 0717 131/71 97 9 °F (36 6 °C) -- 60 18 95 % -- --   04/14/22 0403 132/75 -- -- 68 -- 95 % -- --   04/13/22 2119 118/64 97 9 °F (36 6 °C) Oral 69 16 93 % -- --   04/13/22 1648 138/69 97 8 °F (36 6 °C) Oral 61 16 95 % -- 77 2 kg (170 lb 3 1 oz)   04/13/22 1617 127/67 -- -- 61 16 97 % -- --   04/13/22 1600 129/70 -- -- 60 -- 99 % -- --   04/13/22 1515 136/75 -- -- 60 16 98 % -- --   04/13/22 1500 122/70 -- -- 60 16 98 % -- --   04/13/22 1445 121/63 -- -- 58 -- 98 % -- --   04/13/22 1436 130/69 -- -- 56 -- 98 % -- --   04/13/22 1427 145/65 -- -- 59 16 99 % -- --   04/13/22 1416 127/65 -- -- 62 -- -- -- --   04/13/22 1414 117/65 -- -- 61 -- -- -- --   04/13/22 1346 116/61 -- -- 58 -- -- -- --   04/13/22 1311 96/55 98 3 °F (36 8 °C) Oral 58 16 98 % 5' 3" (1 6 m) --       Invasive Devices:      Vitals:    04/14/22 0717   BP: 131/71   Pulse: 60   Resp: 18   Temp: 97 9 °F (36 6 °C)   SpO2: 95%     General:  Well-developed well-nourished no acute distress feeling better  Skin:  No acute rash  Eyes:  No scleral icterus and noninjected  ENT:  Normocephalic/atraumatic, mucous membranes moist  Neck:  Supple, no jugular venous distention, no carotid bruits, 1+ carotid upstroke, no thyromegaly and trachea midline  Back:  No CVA tenderness  Chest:  Clear to auscultation and percussion, good respiratory effort no use of accessory respiratory muscle  CVS:  Regular rate and rhythm without a murmur rub or gallops appreciable  Abdomen:  Soft and nontender normal bowel sounds, no hepatosplenomegaly or bruits appreciable  Extremities:  No clubbing, no cyanosis, no edema, no significant arthritic changes, no femoral bruits, 1+ dorsalis pedis pulses  Neuro:  No gross focality  Psych:  Alert, oriented and appropriate    Current Weight: Weight - Scale: 77 2 kg (170 lb 3 1 oz)  First Weight: Weight - Scale: 77 2 kg (170 lb 3 1 oz)    Lab Results:  I have personally reviewed pertinent labs    CBC:   Lab Results   Component Value Date    WBC 12 57 (H) 04/14/2022    WBC 6 80 03/06/2020    RBC 4 09 04/14/2022     CMP:   Lab Results   Component Value Date     04/14/2022    CO2 24 04/14/2022    CO2 28 11/30/2018    BUN 71 (H) 04/14/2022    CREATININE 2 16 (H) 04/14/2022    GLUCOSE 168 (H) 11/30/2018    CALCIUM 8 9 04/14/2022    AST 27 04/13/2022    ALT 53 04/13/2022    ALKPHOS 91 04/13/2022    EGFR 22 04/14/2022     Phosphorus:   Lab Results   Component Value Date    PHOS 3 7 04/03/2022     Magnesium:   Lab Results   Component Value Date    MG 2 7 (H) 04/13/2022     Urinalysis:   Lab Results   Component Value Date COLORU Yellow 04/13/2022    CLARITYU Clear 04/13/2022    SPECGRAV 1 010 04/13/2022    PHUR 6 0 04/13/2022    PHUR 6 0 04/13/2022    LEUKOCYTESUR Moderate (A) 04/13/2022    NITRITE Negative 04/13/2022    GLUCOSEU Negative 04/13/2022    KETONESU Negative 04/13/2022    BILIRUBINUR Negative 04/13/2022    BLOODU Negative 04/13/2022     BMP:   Lab Results   Component Value Date    GLUCOSE 168 (H) 11/30/2018    SODIUM 135 (L) 04/14/2022    CO2 24 04/14/2022    CO2 28 11/30/2018    BUN 71 (H) 04/14/2022    CREATININE 2 16 (H) 04/14/2022    CALCIUM 8 9 04/14/2022     ABGs:   Lab Results   Component Value Date    PH 7 430 11/30/2018     Radiology review:   Chest x-ray without any active disease        Assessment/Plan   66y o  year old female with PMH of CKD stage 3/diabetes mellitus type 2/hypertension/pulmonary hypertension/fibromyalgia who presents with weakness for 24 hours seen by Pulmonary found to have low blood pressure in their office  Patient was admitted with a blood pressure 96/55  1 CKD:     Baseline creatinine:  Approximately 2 possibly slightly higher with treatment for proteinuria with an ARB/diuretic   Current creatinine:  2 17 essentially at baseline     Etiology:  Diabetic kidney disease/hypertensive nephrosclerosis/arteriolar nephrosclerosis  Plan:   Hold ARB and restart torsemide in a m     Adjust blood pressure medications and follow labs   Maintain mean arterial pressure above 65 mmHg to avoid hypotension/hypoperfusion   Avoid nephrotoxic agents such as NSAIDs and contrast if at all possible      2  Volume/hypertension:   Volume:  Certainly was probably mildly prerenal with a low blood pressure   Blood pressure:  Low, probably a combination of weight loss, torsemide olmesartan and the new spironolactone that was placed in February in an attempt to maximize anti proteinuric effect and blood pressure treatment    Will recommend the following and was reviewed with SLIM as well as with the patient and her    Hold olmesartan   Hold hydralazine   Hold and permanently stop spironolactone   Continue metoprolol   Begin amlodipine 5 mg daily today   Begin torsemide 20 mg daily in the morning   Monitor blood pressures this next week may have to adjust further   Most likely will require re-initiation of olmesartan possibly at a smaller dose in the next 1-2 weeks      3  Electrolytes:   Borderline hyponatremia will monitor   Potassium acceptable 4 6 should improve off olmesartan and spironolactone also with torsemide    4  MBD of CKD/AK I:   Calcium acceptable magnesium high hold all products of magnesium    5  Anemia:   Current hemoglobin:  Normal at 13 3  Treatment:   Transfuse for hemoglobin less than 7 0 per primary service      6  Other problems:   Major reason for hospitalization:  o Hypotension please see above  o Mild elevation in creatinine  o UTI being treated with doxycycline per ID        Case discussed with LIBRADO at great length in terms of the blood pressure medications        Portions of the record may have been created with voice recognition software  Occasional wrong word or "sound a like" substitutions may have occurred due to the inherent limitations of voice recognition software  Read the chart carefully and recognize, using context, where substitutions have occurred

## 2022-04-14 NOTE — DISCHARGE INSTR - AVS FIRST PAGE
Dear Hope Gutierrez,     It was our pleasure to care for you here at [facility]  It is our hope that we were always able to exceed the expected standards for your care during your stay  You were hospitalized due to low blood pressure and urinary tract infection  You were cared for on the [place] 4th floor by Mily Perez PA-C under the service of Malia Hardy DO with the [facility] who covers for your primary care physician (PCP), Lien Parra MD, while you were hospitalized  If you have any questions or concerns related to this hospitalization, you may contact us at [phone]  For follow up as well as any medication refills, we recommend that you follow up with your primary care physician  A registered nurse will reach out to you by phone within a few days after your discharge to answer any additional questions that you may have after going home  However, at this time we provide for you here, the most important instructions / recommendations at discharge:     Notable Medication Adjustments -   Stop Aldactone completely  Take doxycycline 100 mg twice a day for 4 more days for urine infection  Unfortunately because of your kidney disease I cannot prescribe medicine for burning like Pyridium  We are resuming your amlodipine today and Dr Jonah Morris would like you to resume your torsemide tomorrow  For now, do not resume your Benicar or hydralazine  Testing Required after Discharge -   You will need to have repeat kidney labs done as soon as you get back from vacation  Important follow up information -   Follow-up with your nephrologist as soon as you get back from [place] [address]  to aim for good blood sugar control as this can also help to prevent urinary tract and vaginal yeast infections  Follow-up with family doctor    I have asked them to contact you with the results of your final urine culture when it is available  Other Instructions - Drink more fluids  This will also help to flush out the urinary tract and help with burning  You can take Tylenol as needed for discomfort but no NSAIDs ever  Monitor your blood pressure  Please review this entire after visit summary as additional general instructions including medication list, appointments, activity, diet, any pertinent wound care, and other additional recommendations from your care team that may be provided for you        Sincerely,     Saran Smith PA-C

## 2022-04-14 NOTE — PLAN OF CARE
Problem: PAIN - ADULT  Goal: Verbalizes/displays adequate comfort level or baseline comfort level  Description: Interventions:  - Encourage patient to monitor pain and request assistance  - Assess pain using appropriate pain scale  - Administer analgesics based on type and severity of pain and evaluate response  - Implement non-pharmacological measures as appropriate and evaluate response  - Consider cultural and social influences on pain and pain management  - Notify physician/advanced practitioner if interventions unsuccessful or patient reports new pain  Outcome: Progressing     Problem: INFECTION - ADULT  Goal: Absence or prevention of progression during hospitalization  Description: INTERVENTIONS:  - Assess and monitor for signs and symptoms of infection  - Monitor lab/diagnostic results  - Monitor all insertion sites, i e  indwelling lines, tubes, and drains  - Monitor endotracheal if appropriate and nasal secretions for changes in amount and color  - [place] appropriate cooling/warming therapies per order  - Administer medications as ordered  - Instruct and encourage patient and family to use good hand hygiene technique  - Identify and instruct in appropriate isolation precautions for identified infection/condition  Outcome: Progressing  Goal: Absence of fever/infection during neutropenic period  Description: INTERVENTIONS:  - Monitor WBC    Outcome: Progressing     Problem: SAFETY ADULT  Goal: Patient will remain free of falls  Description: INTERVENTIONS:  - Educate patient/family on patient safety including physical limitations  - Instruct patient to call for assistance with activity   - Consult OT/PT to assist with strengthening/mobility   - Keep Call bell within reach  - Keep bed low and locked with side rails adjusted as appropriate  - Keep care items and personal belongings within reach  - Initiate and maintain comfort rounds  - Make Fall Risk Sign visible to staff  - Offer Toileting every  Hours, in advance of need  - Initiate/Maintain alarm  - Obtain necessary fall risk management equipment:   - Apply yellow socks and bracelet for high fall risk patients  - Consider moving patient to room near nurses station  Outcome: Progressing  Goal: Maintain or return to baseline ADL function  Description: INTERVENTIONS:  -  Assess patient's ability to carry out ADLs; assess patient's baseline for ADL function and identify physical deficits which impact ability to perform ADLs (bathing, care of mouth/teeth, toileting, grooming, dressing, etc )  - Assess/evaluate cause of self-care deficits   - Assess range of motion  - Assess patient's mobility; develop plan if impaired  - Assess patient's need for assistive devices and provide as appropriate  - Encourage maximum independence but intervene and supervise when necessary  - Involve family in performance of ADLs  - Assess for home care needs following discharge   - Consider OT consult to assist with ADL evaluation and planning for discharge  - Provide patient education as appropriate  Outcome: Progressing  Goal: Maintains/Returns to pre admission functional level  Description: INTERVENTIONS:  - Perform BMAT or MOVE assessment daily    - Set and communicate daily mobility goal to care team and patient/family/caregiver  - Collaborate with rehabilitation services on mobility goals if consulted  - Perform Range of Motion  times a day  - Reposition patient every  hours    - Dangle patient  times a day  - Stand patient  times a day  - Ambulate patient  times a day  - Out of bed to chair  times a day   - Out of bed for meals  times a day  - Out of bed for toileting  - Record patient progress and toleration of activity level   Outcome: Progressing     Problem: DISCHARGE PLANNING  Goal: Discharge to home or other facility with appropriate resources  Description: INTERVENTIONS:  - Identify barriers to discharge w/patient and caregiver  - Arrange for needed discharge resources and transportation as appropriate  - Identify discharge learning needs (meds, wound care, etc )  - Arrange for interpretive services to assist at discharge as needed  - Refer to Case Management Department for coordinating discharge planning if the patient needs post-hospital services based on physician/advanced practitioner order or complex needs related to functional status, cognitive ability, or social support system  Outcome: Progressing     Problem: Knowledge Deficit  Goal: Patient/family/caregiver demonstrates understanding of disease process, treatment plan, medications, and discharge instructions  Description: Complete learning assessment and assess knowledge base    Interventions:  - Provide teaching at level of understanding  - Provide teaching via preferred learning methods  Outcome: Progressing     Problem: Potential for Falls  Goal: Patient will remain free of falls  Description: INTERVENTIONS:  - Educate patient/family on patient safety including physical limitations  - Instruct patient to call for assistance with activity   - Consult OT/PT to assist with strengthening/mobility   - Keep Call bell within reach  - Keep bed low and locked with side rails adjusted as appropriate  - Keep care items and personal belongings within reach  - Initiate and maintain comfort rounds  - Make Fall Risk Sign visible to staff  - Offer Toileting every  Hours, in advance of need  - Initiate/Maintain alarm  - Obtain necessary fall risk management equipment:   - Apply yellow socks and bracelet for high fall risk patients  - Consider moving patient to room near nurses station  Outcome: Progressing

## 2022-04-15 LAB — BACTERIA UR CULT: ABNORMAL

## 2022-04-20 ENCOUNTER — TELEPHONE (OUTPATIENT)
Dept: NEPHROLOGY | Facility: CLINIC | Age: 72
End: 2022-04-20

## 2022-04-20 NOTE — TELEPHONE ENCOUNTER
Patient called in as per Dr Chai Becker request to check up on how patient is feeling post hospital encounter while on vacation-   Her BP's are ranging from 129//76 the last 4 days (I did ask if patient has a week of BP's sitting and standing but patient is on vacation and states she is not doing that ) Feeling just fine  States she will have BMP repeated next Thursday when she returns from vacation  Thank you

## 2022-04-28 ENCOUNTER — OFFICE VISIT (OUTPATIENT)
Dept: FAMILY MEDICINE CLINIC | Facility: CLINIC | Age: 72
End: 2022-04-28
Payer: MEDICARE

## 2022-04-28 VITALS
OXYGEN SATURATION: 98 % | BODY MASS INDEX: 29.3 KG/M2 | DIASTOLIC BLOOD PRESSURE: 78 MMHG | HEIGHT: 63 IN | RESPIRATION RATE: 18 BRPM | HEART RATE: 65 BPM | WEIGHT: 165.4 LBS | SYSTOLIC BLOOD PRESSURE: 140 MMHG | TEMPERATURE: 98.7 F

## 2022-04-28 DIAGNOSIS — I95.9 HYPOTENSION, UNSPECIFIED HYPOTENSION TYPE: ICD-10-CM

## 2022-04-28 DIAGNOSIS — N30.00 ACUTE CYSTITIS WITHOUT HEMATURIA: Primary | ICD-10-CM

## 2022-04-28 PROCEDURE — 99214 OFFICE O/P EST MOD 30 MIN: CPT | Performed by: FAMILY MEDICINE

## 2022-04-28 NOTE — PHYSICIAN ADVISOR
Current patient class: Inpatient  The patient is currently on Hospital Day: 2 at [address]      The patient was admitted to the hospital  on 4/13/22 at  3:25 PM for the following diagnosis:  UTI (urinary tract infection) [N39 0]  Low BP [I95 9]  Hypotension [I95 9]     After review of the relevant documentation, labs, vital signs and test results, this is a PROVIDER LIABLE case  In this particular case the patient was admitted to the hospital as an inpatient  The patient however failed to satisfy the 2 midnight benchmark and closer scrutiny of the case was warranted  After review of the patient presentation and relevant labs the patient was most appropriate for observation or outpatient class at the time of admission  Given that this patient has already been discharged prior to this review they become a provider liable case  Rationale is as follows: The patient is a 76-year-old female who was referred to the hospital because of hypotension with weakness and fatigue  Initial blood pressure was 96/55 but this quickly improved by the next vital sign check  Creatinine was above baseline but per the note this did not meet the threshold for acute kidney injury  She has chronic kidney disease stage 4 at baseline  It was felt that the patient did have some volume depletion and urinary tract infection  Urine culture showed Enterobacter cloacae, a a multi-drug resistant organism  The patient was very eager for discharge  The hospitalist reviewed the case remotely with ID and was felt reasonable to discharge her on doxycycline with close outpatient follow-up  Based on her general stability and length of stay of one midnight, I recommend part B correction        The patients vitals on arrival were   ED Triage Vitals [04/13/22 1311]   Temperature Pulse Respirations Blood Pressure SpO2   98 3 °F (36 8 °C) 58 16 96/55 98 %      Temp Source Heart Rate Source Patient Position - Orthostatic VS BP Location FiO2 (%)   Oral Monitor Sitting Left arm --      Pain Score       3           Past Medical History:   Diagnosis Date    Allergic     Anesthesia     pt reports "had to use double lumen endo tube for hiatal hernia repair/so surgeon could get to where he needed to work"    Arthritis     Aspiration into airway     Basal cell carcinoma 2007    left cheek     BCC (basal cell carcinoma) 05/27/2021    Left Nasal tip    Cancer [facility] )     squamous cell cancer on forhead    Cancer ([facility] )     basal cell on nose     Chronic kidney disease 2000, 2018    Stones, kidney disease stage 4    Chronic pain disorder     bilat feet and joint pain on occas    Colon polyp     COVID-19 08/2021    recovered at home/did receive monoclonal infusion    CPAP (continuous positive airway pressure) dependence     not using as has been recalled    Dental bridge present     Depression     Diabetes mellitus [facility] )     Type 2    Diabetic neuropathy (HCC)     Disease of thyroid gland     Family history of thyroid problem     Fatty liver     GERD (gastroesophageal reflux disease)     Heart burn     History of pneumonia     Hyperlipidemia     Hyperplasia, parathyroid ([facility] )     Hypertension     Kidney problem     Kidney stone     Memory loss Julu2 2020    Motion sickness     Obesity 1978    Obesity (BMI 30 0-34  9)     Pollen allergies     RLS (restless legs syndrome)     SCC (squamous cell carcinoma) 05/04/2021    left mid forehead    Seasonal allergies     Sleep apnea     uses CPAP    Squamous cell skin cancer 2007    left cheek     Swollen ankles     Urinary tract infection 3/28/22    Wears glasses      Past Surgical History:   Procedure Laterality Date    ARTHROSCOPY KNEE      BREAST BIOPSY      stereotactic-benign    BREAST BIOPSY      stereo-benign    BREAST EXCISIONAL BIOPSY      unknown date-benign    BREAST EXCISIONAL BIOPSY      unknown date-benign    BREAST EXCISIONAL BIOPSY unknown date-benign    BREAST EXCISIONAL BIOPSY      unknown age-benign    CHOLECYSTECTOMY      COLONOSCOPY      EXAMINATION UNDER ANESTHESIA N/A 6/24/2021    Procedure: EXAM UNDER ANESTHESIA (EUA), DISE;  Surgeon: Kenneth Yoon MD;  [facility];  Service: ENT    HERNIA REPAIR      HIATAL HERNIA REPAIR      KNEE SURGERY      Torn maniscus lap surg    LIPOSUCTION      LYMPH NODE BIOPSY      MOHS SURGERY  05/20/2021    left mid forehead-Gautam    MOHS SURGERY Left 05/27/2021    Left nasal tip- gautam     DC INCISION IMPLANT CRANIAL NERVE STIM ELECTRODE/PULSE GEN N/A 11/10/2021    Procedure: INSERTION UPPER AIRWAY STIMULATOR, INSPIRE IMPLANT;  Surgeon: Kenneth Yoon MD;  Location[facility];  Service: ENT    REDUCTION MAMMAPLASTY Bilateral 2000    REDUCTION MAMMAPLASTY      SKIN BIOPSY      SKIN CANCER EXCISION  2007    squamous cell carcinoma     SKIN CANCER EXCISION  2007    basal cell carcinoma    SQUAMOUS CELL CARCINOMA EXCISION      TOE SURGERY      TONSILLECTOMY      TUBAL LIGATION      UPPER GASTROINTESTINAL ENDOSCOPY             Consults have been placed to:   IP CONSULT TO NEPHROLOGY    Vitals:    04/13/22 1648 04/13/22 2119 04/14/22 0403 04/14/22 0717   BP: 138/69 118/64 132/75 131/71   BP Location: Left arm Left arm     Pulse: 61 69 68 60   Resp: 16 16  18   Temp: 97 8 °F (36 6 °C) 97 9 °F (36 6 °C)  97 9 °F (36 6 °C)   TempSrc: Oral Oral     SpO2: 95% 93% 95% 95%   Weight: 77 2 kg (170 lb 3 1 oz)      Height:           Most recent labs:    No results for input(s): WBC, HGB, HCT, PLT, K, NA, CALCIUM, BUN, CREATININE, LIPASE, AMYLASE, INR, TROPONINI, CKTOTAL, AST, ALT, ALKPHOS, BILITOT in the last 72 hours  Scheduled Meds:  Continuous Infusions:No current facility-administered medications for this encounter  PRN Meds:      Surgical procedures (if appropriate):

## 2022-04-28 NOTE — PROGRESS NOTES
Assessment/Plan:       Problem List Items Addressed This Visit        Cardiovascular and Mediastinum    Hypotension     Multifactorial    · Med changes as noted in D/C summary:   ·  "The plan moving forward with her antihypertensive agents will be to resume her Norvasc today, added on her torsemide tomorrow, completely discontinue spironolactone and aim to resume her Benicar in the future to prevent proteinuria  We will also maintain her off hydralazine for now"  She has repeat labs ordered  Renal function stable  She has f/u with her nephrologist Dr Jonah Morris in the near future  Her blood pressure is a bit elevated today- no changes made today given recent hypotensive episodes and upcoming nephro visit  Genitourinary    UTI (urinary tract infection) - Primary     S/p ceftriaxone x 1 and outpatient doxycycline  No urinary symptoms at present  Subjective:     Te Deleon is a 67 y o  female here today with chief complaint below:  Chief Complaint   Patient presents with    Transition of Care Management     - CC above per clinical staff and reviewed  HPI:    Pt here for f/u tcm visit after recent admission for lightheadedness, hypotension, and weakness  She was found to have UTI (enterobacter)- treated with cefriaxone IV x 1 and outpatient doxycycline (plan was reviewed with ID in the inpatient setting )  Plan per discharge summary for BP medication:  · The plan moving forward with her antihypertensive agents will be to resume her Norvasc today, added on her torsemide tomorrow, completely discontinue spironolactone and aim to resume her Benicar in the future to prevent proteinuria  We will also maintain her off hydralazine for now      She notes she is taking meds as prescribed  No lightheadedness since hospital discharge  BS have been at goal at home  BP at home 120-130/ 70s since discharge  No CP or SOB    Using miralax for constipation  Has not had diarrhea    No urinary symptoms  Has lab slip for repeat BMP  She has f/u planned with Dr Reina Pelaez, her nephrologist       TCM Call (since 4/1/2022)     Date and time call was made  4/14/2022  2:59 PM    Hospital care reviewed  Discussed with Inpatient Physician; Records reviewed        Patient was hospitialized at  [address] [place] [address]        Date of Admission  04/13/22    Date of discharge  04/14/22    Diagnosis  UTI    Disposition  Home    Were the patients medications reviewed and updated  Yes    Current Symptoms  None    Dizziness severity  Mild      TCM Call (since 4/1/2022)     Post hospital issues  None    Should patient be enrolled in anticoag monitoring? No    Scheduled for follow up? Yes    Patients specialists  Nephrologist    Referrals needed  Gabriel Zendejas MA     Did you obtain your prescribed medications  Yes    Do you need help managing your prescriptions or medications  No    Is transportation to your appointment needed  No    I have advised the patient to call PCP with any new or worsening symptoms  Tashi Espino RN    Living Arrangements  Children; Spouse or Significiant other    Support System  Children; Spouse    The type of support provided  Emotional    Do you have social support  Yes, as much as I need    Are you recieving any outpatient services  No    Are you recieving home care services  No    Are you using any community resources  No    Current waiver services  No            The following portions of the patient's history were reviewed and updated as appropriate: allergies, current medications, past family history, past medical history, past social history, past surgical history and problem list     ROS:  Review of Systems   No fever, no cp or sob  Normal BMs  No blood in stool or urine  Notes mood is ok- overwhelmed by health concerns but no thoughts of self harm, feels she is doing ok      Objective:      /78   Pulse 65   Temp 98 7 °F (37 1 °C)   Resp 18   Ht 5' 3" (1 6 m)   Wt 75 kg (165 lb 6 4 oz)   SpO2 98%   BMI 29 30 kg/m²   BP Readings from Last 3 Encounters:   04/28/22 140/78   04/14/22 131/71   04/13/22 93/55     Wt Readings from Last 3 Encounters:   04/28/22 75 kg (165 lb 6 4 oz)   04/13/22 77 2 kg (170 lb 3 1 oz)   04/13/22 74 8 kg (165 lb)               Physical Exam:   Physical Exam  Vitals and nursing note reviewed  Constitutional:       General: She is not in acute distress  Appearance: Normal appearance  She is well-developed  HENT:      Head: Normocephalic and atraumatic  Neck:      Vascular: No carotid bruit  Cardiovascular:      Rate and Rhythm: Normal rate and regular rhythm  Pulmonary:      Effort: Pulmonary effort is normal  No respiratory distress  Breath sounds: Normal breath sounds  No wheezing  Abdominal:      Palpations: Abdomen is soft  Tenderness: There is no abdominal tenderness  There is no guarding or rebound  Musculoskeletal:      Cervical back: Neck supple  Right lower leg: No edema  Left lower leg: No edema  Lymphadenopathy:      Cervical: No cervical adenopathy  Skin:     General: Skin is warm and dry  Neurological:      Mental Status: She is alert and oriented to person, place, and time  Psychiatric:         Behavior: Behavior normal          BMI Counseling: Body mass index is 29 3 kg/m²  The BMI is above normal  Nutrition recommendations include moderation in carbohydrate intake  Rationale for BMI follow-up plan is due to patient being overweight or obese

## 2022-04-29 ENCOUNTER — APPOINTMENT (OUTPATIENT)
Dept: LAB | Facility: AMBULARY SURGERY CENTER | Age: 72
End: 2022-04-29
Payer: MEDICARE

## 2022-04-29 DIAGNOSIS — Z79.4 TYPE 2 DIABETES MELLITUS WITH CHRONIC KIDNEY DISEASE, WITH LONG-TERM CURRENT USE OF INSULIN, UNSPECIFIED CKD STAGE (HCC): ICD-10-CM

## 2022-04-29 DIAGNOSIS — I12.9 BENIGN HYPERTENSION WITH CKD (CHRONIC KIDNEY DISEASE) STAGE III (HCC): ICD-10-CM

## 2022-04-29 DIAGNOSIS — D63.1 ANEMIA IN STAGE 4 CHRONIC KIDNEY DISEASE (HCC): ICD-10-CM

## 2022-04-29 DIAGNOSIS — N17.9 ACUTE KIDNEY INJURY SUPERIMPOSED ON CHRONIC KIDNEY DISEASE (HCC): ICD-10-CM

## 2022-04-29 DIAGNOSIS — E11.21 TYPE 2 DIABETES MELLITUS WITH DIABETIC NEPHROPATHY, WITH LONG-TERM CURRENT USE OF INSULIN (HCC): ICD-10-CM

## 2022-04-29 DIAGNOSIS — E11.22 TYPE 2 DIABETES MELLITUS WITH CHRONIC KIDNEY DISEASE, WITH LONG-TERM CURRENT USE OF INSULIN, UNSPECIFIED CKD STAGE (HCC): ICD-10-CM

## 2022-04-29 DIAGNOSIS — N18.4 STAGE 4 CHRONIC KIDNEY DISEASE (HCC): ICD-10-CM

## 2022-04-29 DIAGNOSIS — N18.9 ACUTE KIDNEY INJURY SUPERIMPOSED ON CHRONIC KIDNEY DISEASE (HCC): ICD-10-CM

## 2022-04-29 DIAGNOSIS — E11.22 TYPE 2 DIABETES MELLITUS WITH STAGE 3B CHRONIC KIDNEY DISEASE, UNSPECIFIED WHETHER LONG TERM INSULIN USE (HCC): ICD-10-CM

## 2022-04-29 DIAGNOSIS — I12.9 HYPERTENSIVE CHRONIC KIDNEY DISEASE WITH STAGE 1 THROUGH STAGE 4 CHRONIC KIDNEY DISEASE, OR UNSPECIFIED CHRONIC KIDNEY DISEASE: ICD-10-CM

## 2022-04-29 DIAGNOSIS — N18.30 BENIGN HYPERTENSION WITH CKD (CHRONIC KIDNEY DISEASE) STAGE III (HCC): ICD-10-CM

## 2022-04-29 DIAGNOSIS — Z87.442 HISTORY OF KIDNEY STONES: ICD-10-CM

## 2022-04-29 DIAGNOSIS — D63.1 ANEMIA OF CHRONIC RENAL FAILURE, STAGE 3B (HCC): ICD-10-CM

## 2022-04-29 DIAGNOSIS — E11.21 DIABETIC NEPHROPATHY ASSOCIATED WITH TYPE 2 DIABETES MELLITUS (HCC): ICD-10-CM

## 2022-04-29 DIAGNOSIS — Z79.4 TYPE 2 DIABETES MELLITUS WITH DIABETIC NEPHROPATHY, WITH LONG-TERM CURRENT USE OF INSULIN (HCC): ICD-10-CM

## 2022-04-29 DIAGNOSIS — N18.32 TYPE 2 DIABETES MELLITUS WITH STAGE 3B CHRONIC KIDNEY DISEASE, UNSPECIFIED WHETHER LONG TERM INSULIN USE (HCC): ICD-10-CM

## 2022-04-29 DIAGNOSIS — N18.4 ANEMIA IN STAGE 4 CHRONIC KIDNEY DISEASE (HCC): ICD-10-CM

## 2022-04-29 DIAGNOSIS — N18.32 ANEMIA OF CHRONIC RENAL FAILURE, STAGE 3B (HCC): ICD-10-CM

## 2022-04-29 DIAGNOSIS — R80.1 PERSISTENT PROTEINURIA: ICD-10-CM

## 2022-04-29 DIAGNOSIS — I10 ESSENTIAL HYPERTENSION: ICD-10-CM

## 2022-04-29 DIAGNOSIS — N18.32 STAGE 3B CHRONIC KIDNEY DISEASE (HCC): ICD-10-CM

## 2022-04-29 DIAGNOSIS — E78.5 DYSLIPIDEMIA: ICD-10-CM

## 2022-04-29 DIAGNOSIS — E55.9 VITAMIN D DEFICIENCY: ICD-10-CM

## 2022-04-29 LAB
25(OH)D3 SERPL-MCNC: 40.8 NG/ML (ref 30–100)
ALBUMIN SERPL BCP-MCNC: 3.6 G/DL (ref 3.5–5)
ALP SERPL-CCNC: 89 U/L (ref 46–116)
ALT SERPL W P-5'-P-CCNC: 50 U/L (ref 12–78)
ANION GAP SERPL CALCULATED.3IONS-SCNC: 6 MMOL/L (ref 4–13)
AST SERPL W P-5'-P-CCNC: 31 U/L (ref 5–45)
BILIRUB SERPL-MCNC: 0.49 MG/DL (ref 0.2–1)
BUN SERPL-MCNC: 39 MG/DL (ref 5–25)
CALCIUM SERPL-MCNC: 9.3 MG/DL (ref 8.3–10.1)
CHLORIDE SERPL-SCNC: 108 MMOL/L (ref 100–108)
CK SERPL-CCNC: 41 U/L (ref 26–192)
CO2 SERPL-SCNC: 26 MMOL/L (ref 21–32)
CREAT SERPL-MCNC: 1.62 MG/DL (ref 0.6–1.3)
CREAT UR-MCNC: 100 MG/DL
ERYTHROCYTE [DISTWIDTH] IN BLOOD BY AUTOMATED COUNT: 14 % (ref 11.6–15.1)
GFR SERPL CREATININE-BSD FRML MDRD: 31 ML/MIN/1.73SQ M
GLUCOSE P FAST SERPL-MCNC: 162 MG/DL (ref 65–99)
HCT VFR BLD AUTO: 36.7 % (ref 34.8–46.1)
HGB BLD-MCNC: 12.2 G/DL (ref 11.5–15.4)
MAGNESIUM SERPL-MCNC: 2.1 MG/DL (ref 1.6–2.6)
MCH RBC QN AUTO: 29.6 PG (ref 26.8–34.3)
MCHC RBC AUTO-ENTMCNC: 33.2 G/DL (ref 31.4–37.4)
MCV RBC AUTO: 89 FL (ref 82–98)
PHOSPHATE SERPL-MCNC: 3.8 MG/DL (ref 2.3–4.1)
PLATELET # BLD AUTO: 289 THOUSANDS/UL (ref 149–390)
PMV BLD AUTO: 11 FL (ref 8.9–12.7)
POTASSIUM SERPL-SCNC: 4.1 MMOL/L (ref 3.5–5.3)
PROT SERPL-MCNC: 6.8 G/DL (ref 6.4–8.2)
PROT UR-MCNC: 73 MG/DL
PROT/CREAT UR: 0.73 MG/G{CREAT} (ref 0–0.1)
PTH-INTACT SERPL-MCNC: 131 PG/ML (ref 18.4–80.1)
RBC # BLD AUTO: 4.12 MILLION/UL (ref 3.81–5.12)
SODIUM SERPL-SCNC: 140 MMOL/L (ref 136–145)
WBC # BLD AUTO: 7.7 THOUSAND/UL (ref 4.31–10.16)

## 2022-04-29 PROCEDURE — 85027 COMPLETE CBC AUTOMATED: CPT

## 2022-04-29 PROCEDURE — 82306 VITAMIN D 25 HYDROXY: CPT

## 2022-04-29 PROCEDURE — 84100 ASSAY OF PHOSPHORUS: CPT

## 2022-04-29 PROCEDURE — 82550 ASSAY OF CK (CPK): CPT

## 2022-04-29 PROCEDURE — 83970 ASSAY OF PARATHORMONE: CPT

## 2022-04-29 PROCEDURE — 83735 ASSAY OF MAGNESIUM: CPT

## 2022-04-29 PROCEDURE — 36415 COLL VENOUS BLD VENIPUNCTURE: CPT

## 2022-04-29 PROCEDURE — 80053 COMPREHEN METABOLIC PANEL: CPT

## 2022-04-29 PROCEDURE — 82570 ASSAY OF URINE CREATININE: CPT

## 2022-04-29 PROCEDURE — 84156 ASSAY OF PROTEIN URINE: CPT

## 2022-05-02 NOTE — ASSESSMENT & PLAN NOTE
Multifactorial    · Med changes as noted in D/C summary:   ·  "The plan moving forward with her antihypertensive agents will be to resume her Norvasc today, added on her torsemide tomorrow, completely discontinue spironolactone and aim to resume her Benicar in the future to prevent proteinuria  We will also maintain her off hydralazine for now"  She has repeat labs ordered  Renal function stable  She has f/u with her nephrologist Dr Skye Galan in the near future  Her blood pressure is a bit elevated today- no changes made today given recent hypotensive episodes and upcoming nephro visit

## 2022-05-03 ENCOUNTER — OFFICE VISIT (OUTPATIENT)
Dept: CARDIOLOGY CLINIC | Facility: CLINIC | Age: 72
End: 2022-05-03
Payer: MEDICARE

## 2022-05-03 VITALS
HEIGHT: 63 IN | OXYGEN SATURATION: 99 % | HEART RATE: 67 BPM | SYSTOLIC BLOOD PRESSURE: 130 MMHG | WEIGHT: 166.4 LBS | DIASTOLIC BLOOD PRESSURE: 72 MMHG | BODY MASS INDEX: 29.48 KG/M2

## 2022-05-03 DIAGNOSIS — R07.89 OTHER CHEST PAIN: Primary | ICD-10-CM

## 2022-05-03 DIAGNOSIS — E78.5 DYSLIPIDEMIA: ICD-10-CM

## 2022-05-03 DIAGNOSIS — I27.20 PULMONARY HYPERTENSION (HCC): ICD-10-CM

## 2022-05-03 DIAGNOSIS — I12.9 HYPERTENSIVE CHRONIC KIDNEY DISEASE WITH STAGE 1 THROUGH STAGE 4 CHRONIC KIDNEY DISEASE, OR UNSPECIFIED CHRONIC KIDNEY DISEASE: ICD-10-CM

## 2022-05-03 PROCEDURE — 99214 OFFICE O/P EST MOD 30 MIN: CPT | Performed by: INTERNAL MEDICINE

## 2022-05-03 NOTE — PATIENT INSTRUCTIONS
Hyperlipidemia   AMBULATORY CARE:   Hyperlipidemia  is a high level of lipids (fats) in your blood  These lipids include cholesterol or triglycerides  Lipids are made by your body  They also come from the foods you eat  Your body needs lipids to work properly, but high levels increase your risk for heart disease, heart attack, and stroke  Call your local emergency number [phone] in the [address] [facility] [facility] or have someone call if:   · You have any of the following signs of a heart attack:      ? Squeezing, pressure, or pain in your chest    ? You may  also have any of the following:     § Discomfort or pain in your back, neck, jaw, stomach, or arm    § Shortness of breath    § Nausea or vomiting    § Lightheadedness or a sudden cold sweat    · You have any of the following signs of a stroke:      ? Numbness or drooping on one side of your face     ? Weakness in an arm or leg    ? Confusion or difficulty speaking    ? Dizziness, a severe headache, or vision loss    Call your doctor if:   · You have questions or concerns about your condition or care  Treatment  may first include lifestyle changes to help decrease your lipid levels  Your provider may recommend you work with a team to manage hyperlipidemia  The team may include medical experts such as a dietitian, an exercise or physical therapist, and a behavior therapist  Your family members may be included in helping you create lifestyle changes  You may also need to take medicine to lower your lipid levels  Some of the lifestyle changes you may need to make include the following:  · Maintain a healthy weight  Ask your healthcare provider what a healthy weight is for you  Ask him or her to help you create a weight loss plan if you are overweight  Weight loss can decrease your cholesterol and triglyceride levels  · Be physically active throughout the day  Physical activity, such as exercise, lowers your cholesterol levels and helps you maintain a healthy weight   Get 30 minutes or more of aerobic exercise 4 to 6 days each week  You can split your exercise into four 10-minute workouts instead of 30 minutes at one time  Examples of aerobic exercises include walking briskly, swimming, or riding a bike  Work with your healthcare provider to plan the best exercise program for you  Also include strength training at least 2 times each week  Your healthcare providers can help you create a physical activity plan  · Do not smoke  Nicotine and other chemicals in cigarettes and cigars can increase your risk for a heart attack and stroke  Ask your healthcare provider for information if you currently smoke and need help to quit  E-cigarettes or smokeless tobacco still contain nicotine  Talk to your healthcare provider before you use these products  · Eat heart-healthy foods  A dietitian or your provider can give you more information on low-sodium plans or the DASH (Dietary Approaches to Stop Hypertension) eating plan  The DASH plan is low in sodium, processed sugar, unhealthy fats, and total fat  It is high in potassium, calcium, and fiber  It is high in potassium, calcium, and fiber  These can be found in vegetables, fruit, and whole-grain foods  The following are ways to get more heart-healthy foods:         ? Decrease the total amount of fat you eat  Choose lean meats, fat-free or 1% fat milk, and low-fat dairy products, such as yogurt and cheese  Limit or do not eat red meat  Red meats are high in fat and cholesterol  ? Replace unhealthy fats with healthy fats  Unhealthy fats include saturated fat, trans fat, and cholesterol  Choose soft margarines that are low in saturated fat and have little or no trans fat  Monounsaturated fats are healthy fats  These are found in olive oil, canola oil, avocado, and nuts  Polyunsaturated fats are also healthy  These are found in fish, flaxseed, walnuts, and soybeans  ? Eat 5 or more servings of fruits and vegetables every day    They are low in calories and fat and a good source of essential vitamins  Include dark green, red, and orange vegetables  Examples include spinach, kale, broccoli, and carrots  ? Eat foods high in fiber  Fiber can help lower your cholesterol levels  Choose whole grain, high-fiber foods  Good choices include whole-wheat breads or cereals, beans, peas, fruits, and vegetables  ? Limit sodium (salt) as directed  Too much sodium can affect your fluid balance and blood pressure  Your healthcare provider will tell you how much sodium and potassium are safe for you to have in a day  He or she may recommend that you limit sodium to 2,300 mg a day  Your provider or a dietitian can help you find ways to limit sodium  For example, if you add salt while you cook, do not add more salt at the table  Check labels to find low-sodium or no-salt-added foods  Some low-sodium foods use potassium salts for flavor  Too much potassium can also cause health problems  · Ask your healthcare provider if it is okay for you to drink alcohol  Alcohol can increase your cholesterol and triglyceride levels  Your provider can tell you how many drinks are okay to have within 24 hours and within 1 week  A drink of alcohol is 12 ounces of beer, 5 ounces of wine, or 1½ ounces of liquor  Follow up with your doctor as directed: You may need to return for more tests  Your healthcare provider may refer you to a dietitian  Write down your questions so you remember to ask them during your visits  © Copyright [employer] 2022 Information is for End User's use only and may not be sold, redistributed or otherwise used for commercial purposes  All illustrations and images included in CareNotes® are the copyrighted property of [employer]  or [facility] Wilman Khoury   The above information is an  only  It is not intended as medical advice for individual conditions or treatments   Talk to your doctor, nurse or pharmacist before following any medical regimen to see if it is safe and effective for you

## 2022-05-03 NOTE — PROGRESS NOTES
Cardiology Consultation     Samir Castellanos  [id number]  1950  [address] [facility] [place] [id number]    1  Other chest pain     2  Pulmonary hypertension ([facility] )     3  Hypertensive chronic kidney disease with stage 1 through stage 4 chronic kidney disease, or unspecified chronic kidney disease     4  Dyslipidemia       Chief Complaint   Patient presents with    Follow-up     6 month no cardiac complaints      HPI: Patient feels well, without complaints  No reported chest pain, shortness of breath, palpitations, lightheadedness, syncope, LE edema, orthopnea, PND, or significant weight changes  Patient remains active without any increased fatigue out of the ordinary        Patient Active Problem List   Diagnosis    Stage 4 chronic kidney disease ([place] [address] )    Hypertensive chronic kidney disease with stage 1 through stage 4 chronic kidney disease, or unspecified chronic kidney disease    BARBARA (obstructive sleep apnea)    RLS (restless legs syndrome)    Persistent proteinuria    Type 2 diabetes mellitus with diabetic nephropathy, with long-term current use of insulin ([facility])    Pulmonary hypertension ([place] [address] )    Dyspnea    History of repair of hiatal hernia    ANDRA (acute kidney injury) ([place] [address] )    Dyslipidemia    Vitamin D deficiency    Anemia of chronic renal failure, stage 3 (moderate) ([facility])    Fibromyalgia    Type 2 diabetes mellitus with diabetic chronic kidney disease ([facility])    GERD (gastroesophageal reflux disease)    History of kidney stones    Urinary urgency    Ambulatory dysfunction    Memory changes    Gastroparesis diabeticorum (HCC)    B12 neuropathy (HCC)    Depression, recurrent (HCC)    Chronic respiratory failure with hypoxia ([facility])    Anemia in stage 4 chronic kidney disease ([facility] )    Epigastric abdominal pain with severe diabetic gastroparesis, status post EGD/Botox injection, 5/14    COPD (chronic obstructive pulmonary disease) (HCC)    BMI 29 0-29 9,adult    Chronic constipation    History of colon polyps    COVID-19 virus infection    Other chest pain    Diabetic nephropathy associated with type 2 diabetes mellitus (HCC)    Abdominal pain    Abnormal thyroid blood test    Hypotension    Urinary retention    UTI (urinary tract infection)     Past Medical History:   Diagnosis Date    Allergic     Anesthesia     pt reports "had to use double lumen endo tube for hiatal hernia repair/so surgeon could get to where he needed to work"    Arthritis     Aspiration into airway     Basal cell carcinoma 2007    left cheek     BCC (basal cell carcinoma) 05/27/2021    Left Nasal tip    Cancer ([facility] )     squamous cell cancer on forhead    Cancer ([facility] )     basal cell on nose     Chronic kidney disease 2000, 2018    Stones, kidney disease stage 4    Chronic pain disorder     bilat feet and joint pain on occas    Colon polyp     COVID-19 08/2021    recovered at home/did receive monoclonal infusion    CPAP (continuous positive airway pressure) dependence     not using as has been recalled    Dental bridge present     Depression     Diabetes mellitus ([facility] )     Type 2    Diabetic neuropathy (HCC)     Disease of thyroid gland     Family history of thyroid problem     Fatty liver     GERD (gastroesophageal reflux disease)     Heart burn     History of pneumonia     Hyperlipidemia     Hyperplasia, parathyroid ([facility] )     Hypertension     Kidney problem     Kidney stone     Memory loss Julu2 2020    Motion sickness     Obesity 1978    Obesity (BMI 30 0-34  9)     Pollen allergies     RLS (restless legs syndrome)     SCC (squamous cell carcinoma) 05/04/2021    left mid forehead    Seasonal allergies     Sleep apnea     uses CPAP    Squamous cell skin cancer 2007    left cheek     Swollen ankles     Urinary tract infection 3/28/22    Wears glasses      Social History     Socioeconomic History    Marital status: /Civil Union     Spouse name: Not on file    Number of children: Not on file    Years of education: Not on file    Highest education level: Not on file   Occupational History    Occupation: RETIRED   Tobacco Use    Smoking status: Former Smoker     Packs/day: 2 00     Years: 10 00     Pack years: 20 00     Types: Cigarettes     Start date: 1/10/1968     Quit date:      Years since quittin 3    Smokeless tobacco: Former User     Quit date: 1976    Tobacco comment: Smoked 2 pack a day   Vaping Use    Vaping Use: Never used   Substance and Sexual Activity    Alcohol use: Not Currently     Alcohol/week: 0 0 standard drinks     Comment: 1 or 2 a year    Drug use: No    Sexual activity: Not Currently     Partners: Male     Birth control/protection: Post-menopausal, Female Sterilization     Comment: defer   Other Topics Concern    Not on file   Social History Narrative    Not on file     Social Determinants of Health     Financial Resource Strain: Not on file   Food Insecurity: Not on file   Transportation Needs: Not on file   Physical Activity: Not on file   Stress: Not on file   Social Connections: Not on file   Intimate Partner Violence: Not on file   Housing Stability: Not on file      Family History   Problem Relation Age of Onset    Heart disease Mother     Depression Mother     Hypertension Mother     COPD Mother     Hearing loss Mother     Anxiety disorder Mother     Heart disease Father     Lung cancer Father 79        Smoker     Cancer Father         brain    Alcohol abuse Father     Dementia Father     Hypertension Father     Thyroid disease Father     COPD Father     Arthritis Father     Brain cancer Father 76    Hypertension Sister     Diabetes Sister     Heart disease Sister     Thyroid disease Sister     Cancer Sister         Lympoma    Lung cancer Sister 78    Anxiety disorder Sister    [employer] Hypertension Brother     Diabetes Brother     Cancer Brother         Throat    Dementia Brother     Stroke Brother     Hypertension Brother     No Known Problems Son     No Known Problems Son     Heart disease Brother     Diabetes Brother     Brain cancer Paternal Aunt         unknown age     Past Surgical History:   Procedure Laterality Date    ARTHROSCOPY KNEE      BREAST BIOPSY      stereotactic-benign    BREAST BIOPSY      stereo-benign    BREAST EXCISIONAL BIOPSY      unknown date-benign    BREAST EXCISIONAL BIOPSY      unknown date-benign    BREAST EXCISIONAL BIOPSY      unknown date-benign    BREAST EXCISIONAL BIOPSY      unknown age-benign    CHOLECYSTECTOMY      COLONOSCOPY      EXAMINATION UNDER ANESTHESIA N/A 6/24/2021    Procedure: EXAM UNDER ANESTHESIA (EUA), DISE;  Surgeon: Pily Emmanuel MD;  [facility];  Service: ENT    HERNIA REPAIR      HIATAL HERNIA REPAIR      KNEE SURGERY      Torn maniscus lap surg    LIPOSUCTION      LYMPH NODE BIOPSY      MOHS SURGERY  05/20/2021    left mid forehead-Gautam    MOHS SURGERY Left 05/27/2021    Left nasal tip- gautam     KY INCISION IMPLANT CRANIAL NERVE STIM ELECTRODE/PULSE GEN N/A 11/10/2021    Procedure: INSERTION UPPER AIRWAY STIMULATOR, INSPIRE IMPLANT;  Surgeon: Pily Emmanuel MD;  Location[facility];  Service: ENT    REDUCTION MAMMAPLASTY Bilateral 2000    REDUCTION MAMMAPLASTY      SKIN BIOPSY      SKIN CANCER EXCISION  2007    squamous cell carcinoma     SKIN CANCER EXCISION  2007    basal cell carcinoma    SQUAMOUS CELL CARCINOMA EXCISION      TOE SURGERY      TONSILLECTOMY      TUBAL LIGATION      UPPER GASTROINTESTINAL ENDOSCOPY         Current Outpatient Medications:     acetaminophen (TYLENOL) 325 mg tablet, Take 2 tablets (650 mg total) by mouth every 6 (six) hours as needed for mild pain, headaches or fever, Disp: , Rfl: 0    albuterol (Ventolin HFA) 90 mcg/act inhaler, Inhale 2 puffs every 6 (six) hours as needed for wheezing or shortness of breath, Disp: , Rfl: 0    amLODIPine (NORVASC) 5 mg tablet, Take 1 tablet (5 mg total) by mouth daily, Disp: 90 tablet, Rfl: 0    b complex vitamins tablet, Take 1 tablet by mouth daily , Disp: , Rfl:     B-D ULTRAFINE III SHORT PEN 31G X 8 MM MISC, USE AS DIRECTED 4 TIMES PER DAY, Disp: , Rfl: 3    DULoxetine HCl (CYMBALTA PO), Take 40 mg by mouth 2 (two) times a day, Disp: , Rfl:     fluticasone (FLONASE) 50 mcg/act nasal spray, 1-2 sprays into each nostril daily, Disp: 18 2 mL, Rfl: 5    gabapentin (NEURONTIN) 100 mg capsule, Take 100 mg by mouth daily, Disp: , Rfl:     insulin aspart (NovoLOG) 100 units/mL injection, Inject under the skin 3 (three) times a day before meals 14 units, 14 units, 18 units  , Disp: , Rfl:     insulin detemir (Levemir FlexTouch) 100 Units/mL injection pen, Inject 50 Units under the skin every evening , Disp: , Rfl:     Krill Oil (Omega-3) 500 MG CAPS, Take 1,000 mg by mouth 2 (two) times a day, Disp: , Rfl:     levothyroxine 112 mcg tablet, Take 112 mcg by mouth every morning, Disp: , Rfl:     metoprolol tartrate (LOPRESSOR) 50 mg tablet, Take 1 tablet (50 mg total) by mouth every 12 (twelve) hours, Disp: 180 tablet, Rfl: 3    rosuvastatin (CRESTOR) 20 MG tablet, Take 1 tablet (20 mg total) by mouth daily, Disp: 30 tablet, Rfl: 0    torsemide (DEMADEX) 20 mg tablet, TAKE 1 TABLET BY MOUTH EVERY DAY, Disp: 90 tablet, Rfl: 3    Vitamin D, Ergocalciferol, 2000 units CAPS, Take 2,000 Units by mouth daily , Disp: , Rfl:   Allergies   Allergen Reactions    Sulfamethoxazole-Trimethoprim Other (See Comments)     Tongue swelling    Ciprofloxacin Hives and Itching     Vitals:    05/03/22 0926   BP: 130/72   BP Location: Left arm   Patient Position: Sitting   Cuff Size: Standard   Pulse: 67   SpO2: 99%   Weight: 75 5 kg (166 lb 6 4 oz)   Height: 5' 3" (1 6 m)       Labs:  Appointment on 04/29/2022   Component Date Value  Creatinine, Ur 04/29/2022 100 0     Protein Urine Random 04/29/2022 73     Prot/Creat Ratio, Ur 04/29/2022 0 73*    Sodium 04/29/2022 140     Potassium 04/29/2022 4 1     Chloride 04/29/2022 108     CO2 04/29/2022 26     ANION GAP 04/29/2022 6     BUN 04/29/2022 39*    Creatinine 04/29/2022 1 62*    Glucose, Fasting 04/29/2022 162*    Calcium 04/29/2022 9 3     AST 04/29/2022 31     ALT 04/29/2022 50     Alkaline Phosphatase 04/29/2022 89     Total Protein 04/29/2022 6 8     Albumin 04/29/2022 3 6     Total Bilirubin 04/29/2022 0 49     eGFR 04/29/2022 31     Magnesium 04/29/2022 2 1     Phosphorus 04/29/2022 3 8     WBC 04/29/2022 7 70     RBC 04/29/2022 4 12     Hemoglobin 04/29/2022 12 2     Hematocrit 04/29/2022 36 7     MCV 04/29/2022 89     MCH 04/29/2022 29 6     MCHC 04/29/2022 33 2     RDW 04/29/2022 14 0     Platelets 57/36/0946 289     MPV 04/29/2022 11 0     Total CK 04/29/2022 41     PTH 04/29/2022 131 0*    Vit D, 25-Hydroxy 04/29/2022 40 8    Admission on 04/13/2022, Discharged on 04/14/2022   Component Date Value    WBC 04/13/2022 11 87*    RBC 04/13/2022 4 39     Hemoglobin 04/13/2022 13 3     Hematocrit 04/13/2022 38 2     MCV 04/13/2022 87     MCH 04/13/2022 30 3     MCHC 04/13/2022 34 8     RDW 04/13/2022 14 0     MPV 04/13/2022 11 4     Platelets 65/46/3521 318     nRBC 04/13/2022 0     Neutrophils Relative 04/13/2022 59     Immat GRANS % 04/13/2022 1     Lymphocytes Relative 04/13/2022 29     Monocytes Relative 04/13/2022 9     Eosinophils Relative 04/13/2022 2     Basophils Relative 04/13/2022 0     Neutrophils Absolute 04/13/2022 7 09     Immature Grans Absolute 04/13/2022 0 07     Lymphocytes Absolute 04/13/2022 3 38     Monocytes Absolute 04/13/2022 1 11     Eosinophils Absolute 04/13/2022 0 18     Basophils Absolute 04/13/2022 0 04     Color, UA 04/13/2022 Yellow     Clarity, UA 04/13/2022 Slightly Cloudy     Specific Gravity, UA 04/13/2022 <=1 005     pH, UA 04/13/2022 6 0     Leukocytes, UA 04/13/2022 Small*    Nitrite, UA 04/13/2022 Positive*    Protein, UA 04/13/2022 Negative     Glucose, UA 04/13/2022 Negative     Ketones, UA 04/13/2022 Negative     Urobilinogen, UA 04/13/2022 0 2     Bilirubin, UA 04/13/2022 Negative     Blood, UA 04/13/2022 Negative     LACTIC ACID 04/13/2022 0 6     Sodium 04/13/2022 137     Potassium 04/13/2022 4 8     Chloride 04/13/2022 99*    CO2 04/13/2022 24     ANION GAP 04/13/2022 14*    BUN 04/13/2022 77*    Creatinine 04/13/2022 2 27*    Glucose 04/13/2022 106     Calcium 04/13/2022 9 2     AST 04/13/2022 27     ALT 04/13/2022 53     Alkaline Phosphatase 04/13/2022 91     Total Protein 04/13/2022 7 4     Albumin 04/13/2022 3 8     Total Bilirubin 04/13/2022 0 31     eGFR 04/13/2022 20     hs TnI 0hr 04/13/2022 <2     Magnesium 04/13/2022 2 7*    POC Glucose 04/13/2022 101     Color, UA 04/13/2022 Yellow     Clarity, UA 04/13/2022 Clear     pH, UA 04/13/2022 6 0     Leukocytes, UA 04/13/2022 Small*    Nitrite, UA 04/13/2022 Negative     Protein, UA 04/13/2022 Negative     Glucose, UA 04/13/2022 Negative     Ketones, UA 04/13/2022 Negative     Urobilinogen, UA 04/13/2022 0 2     Bilirubin, UA 04/13/2022 Negative     Blood, UA 04/13/2022 Negative     Specific Gravity, UA 04/13/2022 1 010     RBC, UA 04/13/2022 None Seen     WBC, UA 04/13/2022 10-20*    Epithelial Cells 04/13/2022 None Seen     Bacteria, UA 04/13/2022 Moderate*    Urine Culture 04/13/2022 >100,000 cfu/ml Enterobacter cloacae complex*    Color, UA 04/13/2022 Yellow     Clarity, UA 04/13/2022 Clear     Specific Gravity, UA 04/13/2022 1 010     pH, UA 04/13/2022 6 0     Leukocytes, UA 04/13/2022 Moderate*    Nitrite, UA 04/13/2022 Negative     Protein, UA 04/13/2022 Negative     Glucose, UA 04/13/2022 Negative     Ketones, UA 04/13/2022 Negative     Urobilinogen, UA 04/13/2022 0 2     Bilirubin, UA 04/13/2022 Negative     Blood, UA 04/13/2022 Negative     POC Glucose 04/13/2022 102     RBC, UA 04/13/2022 None Seen     WBC, UA 04/13/2022 2-4     Epithelial Cells 04/13/2022 None Seen     Bacteria, UA 04/13/2022 Moderate*    OTHER OBSERVATIONS 04/13/2022 Glitter Cells     POC Glucose 04/13/2022 213*    POC Glucose 04/14/2022 184*    Sodium 04/14/2022 135*    Potassium 04/14/2022 4 6     Chloride 04/14/2022 100     CO2 04/14/2022 24     ANION GAP 04/14/2022 11     BUN 04/14/2022 71*    Creatinine 04/14/2022 2 16*    Glucose 04/14/2022 208*    Calcium 04/14/2022 8 9     eGFR 04/14/2022 22     WBC 04/14/2022 12 57*    RBC 04/14/2022 4 09     Hemoglobin 04/14/2022 12 4     Hematocrit 04/14/2022 37 1     MCV 04/14/2022 91     MCH 04/14/2022 30 3     MCHC 04/14/2022 33 4     RDW 04/14/2022 14 3     MPV 04/14/2022 11 2     Platelets 45/11/6120 286     nRBC 04/14/2022 0     Neutrophils Relative 04/14/2022 71     Immat GRANS % 04/14/2022 1     Lymphocytes Relative 04/14/2022 21     Monocytes Relative 04/14/2022 5     Eosinophils Relative 04/14/2022 2     Basophils Relative 04/14/2022 0     Neutrophils Absolute 04/14/2022 8 99*    Immature Grans Absolute 04/14/2022 0 07     Lymphocytes Absolute 04/14/2022 2 62     Monocytes Absolute 04/14/2022 0 64     Eosinophils Absolute 04/14/2022 0 21     Basophils Absolute 04/14/2022 0 04     Ventricular Rate 04/13/2022 60     Atrial Rate 04/13/2022 60     WV Interval 04/13/2022 256     QRSD Interval 04/13/2022 84     QT Interval 04/13/2022 434     QTC Interval 04/13/2022 434     P Axis 04/13/2022 64     QRS Axis 04/13/2022 12     T Wave Axis 04/13/2022 64    Orders Only on 04/05/2022   Component Date Value    Hemoglobin A1C 04/05/2022 7 4    Admission on 04/05/2022, Discharged on 04/05/2022   Component Date Value    WBC 04/05/2022 19 50*    RBC 04/05/2022 4 08     Hemoglobin 04/05/2022 12 2     Hematocrit 04/05/2022 36 2     MCV 04/05/2022 89     MCH 04/05/2022 29 9     MCHC 04/05/2022 33 7     RDW 04/05/2022 14 6     MPV 04/05/2022 11 0     Platelets 84/28/8269 280     nRBC 04/05/2022 0     Neutrophils Relative 04/05/2022 89*    Immat GRANS % 04/05/2022 1     Lymphocytes Relative 04/05/2022 7*    Monocytes Relative 04/05/2022 3*    Eosinophils Relative 04/05/2022 0     Basophils Relative 04/05/2022 0     Neutrophils Absolute 04/05/2022 17 33*    Immature Grans Absolute 04/05/2022 0 23*    Lymphocytes Absolute 04/05/2022 1 36     Monocytes Absolute 04/05/2022 0 56     Eosinophils Absolute 04/05/2022 0 00     Basophils Absolute 04/05/2022 0 02     Color, UA 04/05/2022 Yellow     Clarity, UA 04/05/2022 Clear     Specific Gravity, UA 04/05/2022 <=1 005     pH, UA 04/05/2022 6 0     Leukocytes, UA 04/05/2022 Trace*    Nitrite, UA 04/05/2022 Negative     Protein, UA 04/05/2022 Negative     Glucose, UA 04/05/2022 Negative     Ketones, UA 04/05/2022 Negative     Urobilinogen, UA 04/05/2022 0 2     Bilirubin, UA 04/05/2022 Negative     Blood, UA 04/05/2022 Negative     D-Dimer, Quant 04/05/2022 0 27     hs TnI 0hr 04/05/2022 6     RBC, UA 04/05/2022 None Seen     WBC, UA 04/05/2022 0-1     Epithelial Cells 04/05/2022 Occasional     Bacteria, UA 04/05/2022 None Seen     hs TnI 2hr 04/05/2022 6     Delta 2hr hsTnI 04/05/2022 0     Ventricular Rate 04/05/2022 91     Atrial Rate 04/05/2022 91     ND Interval 04/05/2022 196     QRSD Interval 04/05/2022 84     QT Interval 04/05/2022 326     QTC Interval 04/05/2022 400     P Axis 04/05/2022 55     QRS Axis 04/05/2022 -18     T Wave Axis 04/05/2022 36     Ventricular Rate 04/05/2022 102     Atrial Rate 04/05/2022 102     ND Interval 04/05/2022 192     QRSD Interval 04/05/2022 84     QT Interval 04/05/2022 310     QTC Interval 04/05/2022 404     P Axis 04/05/2022 65     QRS Axis 04/05/2022 -19     T Wave Axis 04/05/2022 70 Admission on 04/03/2022, Discharged on 04/03/2022   Component Date Value    WBC 04/03/2022 8 53     RBC 04/03/2022 4 21     Hemoglobin 04/03/2022 12 6     Hematocrit 04/03/2022 37 7     MCV 04/03/2022 90     MCH 04/03/2022 29 9     MCHC 04/03/2022 33 4     RDW 04/03/2022 14 1     MPV 04/03/2022 11 1     Platelets 30/26/8237 224     nRBC 04/03/2022 0     Neutrophils Relative 04/03/2022 65     Immat GRANS % 04/03/2022 0     Lymphocytes Relative 04/03/2022 25     Monocytes Relative 04/03/2022 7     Eosinophils Relative 04/03/2022 3     Basophils Relative 04/03/2022 0     Neutrophils Absolute 04/03/2022 5 55     Immature Grans Absolute 04/03/2022 0 02     Lymphocytes Absolute 04/03/2022 2 16     Monocytes Absolute 04/03/2022 0 57     Eosinophils Absolute 04/03/2022 0 21     Basophils Absolute 04/03/2022 0 02     Sodium 04/03/2022 138     Potassium 04/03/2022 4 0     Chloride 04/03/2022 99*    CO2 04/03/2022 27     ANION GAP 04/03/2022 12     BUN 04/03/2022 22     Creatinine 04/03/2022 2 27*    Glucose 04/03/2022 133     Calcium 04/03/2022 9 2     AST 04/03/2022 50*    ALT 04/03/2022 84*    Alkaline Phosphatase 04/03/2022 90     Total Protein 04/03/2022 7 3     Albumin 04/03/2022 3 8     Total Bilirubin 04/03/2022 0 42     eGFR 04/03/2022 20     Phosphorus 04/03/2022 3 7     Magnesium 04/03/2022 1 8    Admission on 03/30/2022, Discharged on 04/01/2022   Component Date Value    WBC 03/30/2022 10 49*    RBC 03/30/2022 4 52     Hemoglobin 03/30/2022 13 5     Hematocrit 03/30/2022 41 2     MCV 03/30/2022 91     MCH 03/30/2022 29 9     MCHC 03/30/2022 32 8     RDW 03/30/2022 14 0     MPV 03/30/2022 11 7     Platelets 55/58/2745 247     nRBC 03/30/2022 0     Neutrophils Relative 03/30/2022 67     Immat GRANS % 03/30/2022 1     Lymphocytes Relative 03/30/2022 23     Monocytes Relative 03/30/2022 6     Eosinophils Relative 03/30/2022 3     Basophils Relative 03/30/2022 0     Neutrophils Absolute 03/30/2022 7 06     Immature Grans Absolute 03/30/2022 0 05     Lymphocytes Absolute 03/30/2022 2 37     Monocytes Absolute 03/30/2022 0 67     Eosinophils Absolute 03/30/2022 0 31     Basophils Absolute 03/30/2022 0 03     Sodium 03/30/2022 134*    Potassium 03/30/2022 4 3     Chloride 03/30/2022 99*    CO2 03/30/2022 25     ANION GAP 03/30/2022 10     BUN 03/30/2022 76*    Creatinine 03/30/2022 2 80*    Glucose 03/30/2022 171*    Calcium 03/30/2022 9 0     AST 03/30/2022 33     ALT 03/30/2022 72     Alkaline Phosphatase 03/30/2022 93     Total Protein 03/30/2022 7 1     Albumin 03/30/2022 3 7     Total Bilirubin 03/30/2022 0 37     eGFR 03/30/2022 16     Lipase 03/30/2022 61*    C difficile toxin by PCR 03/31/2022 Negative     Salmonella sp PCR 03/31/2022 None Detected     Shigella sp/Enteroinvasi* 03/31/2022 None Detected     Campylobacter sp (jejuni* 03/31/2022 None Detected     Shiga toxin 1/Shiga toxi* 03/31/2022 None Detected     Color, UA 03/30/2022 Yellow     Clarity, UA 03/30/2022 Clear     pH, UA 03/30/2022 5 0     Leukocytes, UA 03/30/2022 Moderate*    Nitrite, UA 03/30/2022 Negative     Protein, UA 03/30/2022 Trace*    Glucose, UA 03/30/2022 Negative     Ketones, UA 03/30/2022 Negative     Urobilinogen, UA 03/30/2022 0 2     Bilirubin, UA 03/30/2022 Negative     Blood, UA 03/30/2022 Negative     Specific Gravity, UA 03/30/2022 1 015     RBC, UA 03/30/2022 None Seen     WBC, UA 03/30/2022 20-30*    Epithelial Cells 03/30/2022 Occasional     Bacteria, UA 03/30/2022 Moderate*    Procalcitonin 03/30/2022 0 09     TSH 3RD GENERATON 03/30/2022 0 043*    Urine Culture 03/30/2022 50,000-59,000 cfu/ml Enterobacter cloacae complex*    POC Glucose 03/30/2022 134     White Blood Cells, Stool 03/31/2022 Few white blood cells  *    POC Glucose 03/30/2022 181*    Magnesium 03/31/2022 3 3*    WBC 03/31/2022 8 38     RBC 03/31/2022 3 97     Hemoglobin 03/31/2022 11 9     Hematocrit 03/31/2022 35 9     MCV 03/31/2022 90     MCH 03/31/2022 30 0     MCHC 03/31/2022 33 1     RDW 03/31/2022 14 0     MPV 03/31/2022 11 5     Platelets 78/02/9986 227     nRBC 03/31/2022 0     Neutrophils Relative 03/31/2022 64     Immat GRANS % 03/31/2022 1     Lymphocytes Relative 03/31/2022 25     Monocytes Relative 03/31/2022 7     Eosinophils Relative 03/31/2022 3     Basophils Relative 03/31/2022 0     Neutrophils Absolute 03/31/2022 5 37     Immature Grans Absolute 03/31/2022 0 04     Lymphocytes Absolute 03/31/2022 2 13     Monocytes Absolute 03/31/2022 0 56     Eosinophils Absolute 03/31/2022 0 26     Basophils Absolute 03/31/2022 0 02     Sodium 03/31/2022 142     Potassium 03/31/2022 4 5     Chloride 03/31/2022 107     CO2 03/31/2022 25     ANION GAP 03/31/2022 10     BUN 03/31/2022 56*    Creatinine 03/31/2022 2 02*    Glucose 03/31/2022 117     Calcium 03/31/2022 8 4     Corrected Calcium 03/31/2022 9 0     AST 03/31/2022 28     ALT 03/31/2022 60     Alkaline Phosphatase 03/31/2022 82     Total Protein 03/31/2022 6 2*    Albumin 03/31/2022 3 2*    Total Bilirubin 03/31/2022 0 29     eGFR 03/31/2022 24     Procalcitonin 03/31/2022 0 07     TSH 3RD GENERATON 03/31/2022 0 048*    Free T4 03/31/2022 1 16     POC Glucose 03/31/2022 116     POC Glucose 03/31/2022 160*    POC Glucose 03/31/2022 173*    POC Glucose 03/31/2022 253*    Sodium 04/01/2022 142     Potassium 04/01/2022 4 3     Chloride 04/01/2022 108     CO2 04/01/2022 27     ANION GAP 04/01/2022 7     BUN 04/01/2022 36*    Creatinine 04/01/2022 1 65*    Glucose 04/01/2022 106     Calcium 04/01/2022 8 2*    eGFR 04/01/2022 30     WBC 04/01/2022 6 03     RBC 04/01/2022 3 95     Hemoglobin 04/01/2022 11 8     Hematocrit 04/01/2022 34 0*    MCV 04/01/2022 86     MCH 04/01/2022 29 9     MCHC 04/01/2022 34 7     RDW 04/01/2022 13 8     MPV 04/01/2022 11 6  Platelets 37/55/2298 212     Neutrophils Relative 04/01/2022 59     Immat GRANS % 04/01/2022 0     Lymphocytes Relative 04/01/2022 30     Monocytes Relative 04/01/2022 8     Eosinophils Relative 04/01/2022 3     Basophils Relative 04/01/2022 0     Neutrophils Absolute 04/01/2022 3 53     Immature Grans Absolute 04/01/2022 0 01     Lymphocytes Absolute 04/01/2022 1 83     Monocytes Absolute 04/01/2022 0 46     Eosinophils Absolute 04/01/2022 0 19     Basophils Absolute 04/01/2022 0 01     POC Glucose 04/01/2022 118     POC Glucose 04/01/2022 256*   Orders Only on 01/06/2022   Component Date Value    Hemoglobin A1C 01/06/2022 6 8    Orders Only on 01/06/2022   Component Date Value    PROTEIN UA 01/06/2022 71 0     EXT Creatinine Urine 01/06/2022 89 7     EXTERNAL Ur Prot/Creat R* 01/06/2022 0 79    Appointment on 11/17/2021   Component Date Value    Sodium 11/17/2021 139     Potassium 11/17/2021 4 0     Chloride 11/17/2021 103     CO2 11/17/2021 27     ANION GAP 11/17/2021 9     BUN 11/17/2021 44*    Creatinine 11/17/2021 1 76*    Glucose, Fasting 11/17/2021 165*    Calcium 11/17/2021 8 9     eGFR 11/17/2021 28    Appointment on 11/11/2021   Component Date Value    Sodium 11/11/2021 142     Potassium 11/11/2021 3 6     Chloride 11/11/2021 104     CO2 11/11/2021 29     ANION GAP 11/11/2021 9     BUN 11/11/2021 41*    Creatinine 11/11/2021 1 90*    Glucose 11/11/2021 88     Calcium 11/11/2021 8 6     eGFR 11/11/2021 26    There may be more visits with results that are not included  No results found for: CHOL, TRIG, HDL, LDLDIRECT  Imaging: No results found  Review of Systems:  Review of Systems   Constitutional: Negative for activity change, appetite change, chills, diaphoresis, fatigue and unexpected weight change  HENT: Negative for hearing loss, nosebleeds and sore throat  Eyes: Negative for photophobia and visual disturbance     Respiratory: Negative for cough, chest tightness, shortness of breath and wheezing  Cardiovascular: Negative for chest pain, palpitations and leg swelling  Gastrointestinal: Negative for abdominal pain, diarrhea, nausea and vomiting  Endocrine: Negative for polyuria  Genitourinary: Negative for dysuria, frequency and hematuria  Musculoskeletal: Negative for arthralgias, back pain, gait problem and neck pain  Skin: Negative for pallor and rash  Neurological: Negative for dizziness, syncope and headaches  Hematological: Does not bruise/bleed easily  Psychiatric/Behavioral: Negative for behavioral problems and confusion  Physical Exam:  Physical Exam  Vitals reviewed  Constitutional:       Appearance: She is well-developed  She is not diaphoretic  HENT:      Head: Normocephalic and atraumatic  Nose: Nose normal    Eyes:      General: No scleral icterus  Pupils: Pupils are equal, round, and reactive to light  Neck:      Vascular: No JVD  Cardiovascular:      Rate and Rhythm: Normal rate and regular rhythm  Heart sounds: Normal heart sounds  No murmur heard  No friction rub  No gallop  Pulmonary:      Effort: Pulmonary effort is normal  No respiratory distress  Breath sounds: Normal breath sounds  No wheezing or rales  Abdominal:      General: Bowel sounds are normal  There is no distension  Palpations: Abdomen is soft  Tenderness: There is no abdominal tenderness  Musculoskeletal:         General: No deformity  Normal range of motion  Cervical back: Normal range of motion and neck supple  Skin:     General: Skin is warm and dry  Findings: No rash  Neurological:      Mental Status: She is alert and oriented to person, place, and time  Cranial Nerves: No cranial nerve deficit  Psychiatric:         Behavior: Behavior normal        Blood pressure 130/72, pulse 67, height 5' 3" (1 6 m), weight 75 5 kg (166 lb 6 4 oz), SpO2 99 %      Discussion/Summary:  Chest pain: She does have risk factors of age, HTN, HLD, post-menopausal, family history of CAD, former smoker and had a stress test in June 2018  We rechecked stress testing in Sept 2021 with new symptoms, which was normal   Currently symptom free and doing well  Pulm HTN: PASP noted to be 31 mmHg in June 2018 - which is not in the range of pulm HTN, so unlikely to be contributing to symptoms  Continue to follow serially with symptoms  HTN: continued on amlodipine, well controlled today - increased to 5mg twice daily - is being managed by Dr Baljit Sheffield from nephrology - now back to daily dosing due to lower BPs  Continue current regimen for now  HLD: continued on zetia and statin with an LDL of 6 in May 2019  TG are 307 - which will not be helped by the statin or zetia  We stopped both and started on wellchol instead of Tricor since that hasn't done much - but stopped by her PCP  Continues on fish oil  Recheck revealed TG of 215, which is much improved  This will be continued to be followed by her PCP  LDL 34 in May 2021, at goal - was unable to be calculated in Oct 2021 due to significantly elevated TG of 450 - recommended to have tighter BS control and watching carbohydrates in diet    LDL 26  in April 2022 - much improved and at goal

## 2022-05-06 DIAGNOSIS — I12.9 HYPERTENSIVE CHRONIC KIDNEY DISEASE WITH STAGE 1 THROUGH STAGE 4 CHRONIC KIDNEY DISEASE, OR UNSPECIFIED CHRONIC KIDNEY DISEASE: ICD-10-CM

## 2022-05-06 DIAGNOSIS — N18.31 CHRONIC KIDNEY DISEASE (CKD) STAGE G3A/A1, MODERATELY DECREASED GLOMERULAR FILTRATION RATE (GFR) BETWEEN 45-59 ML/MIN/1.73 SQUARE METER AND ALBUMINURIA CREATININE RATIO LESS THAN 30 MG/G (HCC): Primary | ICD-10-CM

## 2022-05-06 RX ORDER — OLMESARTAN MEDOXOMIL 20 MG/1
10 TABLET ORAL DAILY
COMMUNITY
End: 2022-05-06 | Stop reason: SDUPTHER

## 2022-05-06 RX ORDER — OLMESARTAN MEDOXOMIL 20 MG/1
10 TABLET ORAL DAILY
Qty: 30 TABLET | Refills: 4 | Status: SHIPPED | OUTPATIENT
Start: 2022-05-06 | End: 2022-06-23 | Stop reason: SDUPTHER

## 2022-05-06 NOTE — TELEPHONE ENCOUNTER
Let start on olmesartan 10 mg daily:   This can be either to 5 mg tablets or half of a 20 May be better  Then repeat a basic metabolic profile 1-2 weeks later  Repeat a blood pressure reading in about 3-4 weeks morning evening

## 2022-05-06 NOTE — TELEPHONE ENCOUNTER
Pt advised to restart Olmesartan but at  10 mg  QD  Repeat BMP in two weeks  Wait a couple weeks and monitor Bps, morning and evening and send in for review per Dr Alexander Monroe

## 2022-05-06 NOTE — TELEPHONE ENCOUNTER
Pt called  Bps have been climbing steadily, getting readings now  Of 135 to 222 systolic/diastolic 80 to 83  She is currently taking Amlodipine 5 mg  QD, metoprolol succinate 50 mg  BID and Torsemide 20 mg  QD   (she had had low Bps and Olmesartan 40 mg  QD, Hydralazine  25 mg  BID and Spironolactone 25 mg  3x weekly had been discontinued)  She is wondering if you want to restart any medications at this time?

## 2022-05-11 NOTE — PROGRESS NOTES
RENAL FOLLOW UP NOTE: td     ASSESSMENT AND PLAN:  1   CKD stage 4 :  · Etiology:  Diabetic nephropathy/hypertensive nephrosclerosis/arteriolar nephrosclerosis/chronic NSAID use   No evidence of obstructive uropathy, renal artery disease or primary glomerular disease with a bland urinalysis  Of note, CPK was normal at 105 no evidence rhabdomyolysis    · Baseline creatinine:  1 5-2 0  · Current creatinine:  1 62 at baseline  · Urine protein creatinine ratio:  0 73 g at goal!!  · UA demonstrated no significant hematuria but 3+ proteinuria from  · Serologies:  Normal C3/C4; negative rheumatoid factor; negative SPEP:  Negative UPEP;  Light chain ratio 2 09 essentially normal for chronic kidney disease  negative EWELINA; negative hepatitis-C; normal IgA; normal ASO; negative RPR  Recommendations:  · Treat hypertension-please see below  · Treat dyslipidemia-please see below  · Maintain proteinuria less than 1 g or as low as possible  · Avoid nephrotoxic agents such as NSAIDs, patient counseled as such     2   Volume:   · Current status:  Euvolemic  · Torsemide dose:  Continue current dose 20 mg daily  3   Hypertension:  Workup:  · saline suppression test for primary aldosterone state was normal  · plasma free metanephrines was negative  · renal artery duplex was negative 02/2019       Current blood pressure averages:   A m :  132/75, no orthostatic changes  P m :  136/73, no orthostatic changes  Heart rate:  60-70 range        · Goal blood pressure:  less than 130/80 given less than 1 g of proteinuria at this time  Recommendations:  · Push nonmedical regimen including weight loss, isotonic exercise and avoidance of salt; patient counseled as such  · Medication changes today:   · Given lability of her blood pressure specially with recent admission with relative hypotension I would not make any changes but push diet and exercise and avoidance of salt  4   Electrolytes:  all acceptable including a potassium of 4 1   5   Mineral bone disorder:  · Calcium/magnesium/phosphorus:  All acceptable  · PTH intact:  131 0 just monitor for now  · Vitamin-D:  40 8  6   Dyslipidemia:  · Goal LDL:  Less than 100  · Current lipid profile:  LDL not able to calculate/HDL 33/triglycerides 416  Recommendations:  Per Endocrinology but essentially at goal  7   Anemia:   · Current hemoglobin  12 2 essentially normal  8   Other problems:  · Depression  · Diabetes mellitus per primary medical physician  · Hypothyroidism  · BARBARA on CPAP  · Fibromyalgia/osteoarthritis:  Patient with myoclonic movements, seems related to gabapentin it was adjusted with resolution  · Nephrolithiasis  · Basal cell/squamous CA of the skin  · Urinary stress incontinence  · Status post incisional hernia repairs  · hospitalization as outlined below from severe GERD with hypoxemia and shortness of breath from a failed Nissen fundoplication from prior hiatal hernia repair   Patient is due to have further testing with an EGD by GI and then subsequently thoracic surgery consultation for possible repair(however, according to the patient at this juncture she was felt I risk so no surgery is planned at this time)  In addition, she was placed on Protonix 40 b i d  and Pepcid 40 mg hs  · Hospitalization on 07/14/2020 secondary confusion at home   Apparently she had protracted hospital course in [place] following aspiration pneumonia   Ten days in the ICU on a ventilator   No overt infectious process   Low probability for pulmonary embolus despite an elevated D-dimer   Had mild leukocytosis/SIRS criteria subsequently discharged 1 day later when she clinically improved   Symptoms were felt secondary to her protracted ICU course  ·  hospitalization with discharge on 05/18/21:  · Epigastric pain following an EGD on 05/13/2021 for Botox injection at the pylorus for treatment for gastroparesis    Apparently associated with chocolate ingestion and possible cough with aspiration  · Complicated by ANDRA with creatinine to 2 6 which improved with IV fluids TO 1 92 ON 05/18/2021  ·  Hospitalization 04/13/2022: With hypotension fatigue noted to have a low blood pressure mild increasing creatinine, dysuria treated with Bactrim but had an allergic reaction to it as an outpatient she received intravenous ceftriaxone and she was discharged on doxycycline  · Recently placed on spironolactone for blood pressure all medications were held decide from metoprolol  Amlodipine was resumed, torsemide re-initiated, but spironolactone was stopped  Maintain off hydralazine possibly add olmesartan  · Creatinine initially as high as 2 27 reduce down to 2 16 upon discharge     GI health maintenance:  Last colonoscopy:  According to the patient less than 5 years ago; GI has seen her and is planning a colonoscopy ([facility])       PATIENT INSTRUCTIONS:    Patient Instructions   1  Medication changes today:   No medication changes today  2  Please go for non fasting  lab work at this time    3   Please take 1 week a blood pressure readings  prior to your next visit     AS FOLLOWS  MORNING AND EVENING, SITTING AND STANDING as follows:  · TAKE THE MORNING READINGS BEFORE ANY MEDICATIONS AND WHEN YOU ARE RELAXED FOR SEVERAL MINUTES  · TAKE THE EVENING READINGS:  BETWEEN 7-10 P M ; PRIOR TO ANY MEDICATIONS; AT LEAST IN OUR  FROM DINNER; AND CERTAINLY AFTER RELAXING FOR A FEW MINUTES  · PLEASE INCLUDE HEART RATE WITH YOUR BLOOD PRESSURE READINGS  · When taking standing readings, keep your arm supported at heart level and not dangling  · Make sure you are sitting with your back supported and feet on the ground and do not cross your legs or feet  · Make sure you have not taken any coffee or caffeine products or exercised or smoke cigarettes at least 30 minutes before taking your blood pressure  Then please mail these readings into the office    PLEASE CALL THE OFFICE IF YOUR BLOOD PRESSURE STAYS ELEVATED OVER 180 AND DOES NOT IMPROVE WITH RELAXATION, OR IF YOUR BLOOD PRESSURE RUNS LOW LESS THAN 100-110 ON THE TOP; AND CERTAINLY IF YOU DEVELOPED ANY DIZZINESS LIGHTHEADEDNESS OR SEVERE HEADACHE OR ANY OTHER CONCERNING SYMPTOMS    4  Follow-up in 4  months   Please bring in 1 week a blood pressure readings morning evening, sitting and standing is outlined above     Please go for fasting lab work 1-2 weeks prior to your appointment      5  General instructions:   AVOID SALT BUT NOT ADDING AN READING LABELS TO MAKE SURE THERE IS LOW-SALT IN THE FOOD THAT YOU ARE EATING  o Goal is less than 2 g of sodium intake or less than 5 g of sodium chloride intake per day     Avoid nonsteroidal anti-inflammatory drugs such as Naprosyn, ibuprofen, Aleve, Advil, Celebrex, Meloxicam (Mobic) etc   You can use Tylenol as needed if you do not have any liver condition to be concerned about     Avoid medications such as Sudafed or decongestants and antihistamines that contained the D component which is the decongestant  You can take antihistamines without the decongestant or D component   Try to avoid medications such as pantoprazole or  Protonix/Nexium or Esomeprazole)/Prilosec or omeprazole/Prevacid or lansoprazole/AcipHex or Rabeprazole  If you are able to, use Pepcid as this is safer for your kidneys   Try to exercise at least 30 minutes 3 days a week to begin with with an ultimate goal of 5 days a week for at least 30 minutes     Try to lose 5-10 lb by your next visit     Please do not drink more than 2 glasses of alcohol/wine on a daily basis as this may contribute to your high blood pressure  Subjective: The patient overall is feeling well  No fevers, chills, or cough or colds    Good appetite and good energy  No hematuria, dysuria, voiding symptoms or foamy urine  No gastrointestinal symptoms, except for gastroparesis type symptoms on occasion, receiving Botox  No cardiovascular symptoms including swelling of the legs  No headaches, dizziness or lightheadedness  Blood pressure medications:   Torsemide 20 mg daily in the morning   Olmesartan 10 mg daily in the evening   Metoprolol tartrate 50 mg twice a day   Amlodipine 5 mg daily at dinner time      ROS:  See HPI, otherwise review of systems as completely reviewed with the patient are negative    Past Medical History:   Diagnosis Date    Allergic     Anesthesia     pt reports "had to use double lumen endo tube for hiatal hernia repair/so surgeon could get to where he needed to work"    Arthritis     Aspiration into airway     Basal cell carcinoma 2007    left cheek     BCC (basal cell carcinoma) 05/27/2021    Left Nasal tip    [facility]     squamous cell cancer on forhead    Cancer ([facility] [address] [facility]     basal cell on nose     Chronic kidney disease 2000, 2018    Stones, kidney disease stage 4    Chronic pain disorder     bilat feet and joint pain on occas    Colon polyp     COVID-19 08/2021    recovered at home/did receive monoclonal infusion    CPAP (continuous positive airway pressure) dependence     not using as has been recalled    Dental bridge present     Depression     Diabetes mellitus ([facility] [address] [facility] )     Type 2    [facility]     Disease of thyroid gland     Family history of thyroid problem     Fatty liver     GERD (gastroesophageal reflux disease)     Heart burn     History of pneumonia     Hyperlipidemia     Hyperplasia, parathyroid ([facility] [facility]     Hypertension     Kidney problem     Kidney stone     Memory loss Julu2 2020    Motion sickness     Obesity 1978    Obesity (BMI 30 0-34  9)     Pollen allergies     RLS (restless legs syndrome)     SCC (squamous cell carcinoma) 05/04/2021    left mid forehead    Seasonal allergies     Sleep apnea     uses CPAP    Squamous cell skin cancer 2007    left cheek     Swollen ankles     Urinary tract infection 3/28/22    Wears glasses      Past Surgical History:   Procedure Laterality Date    ARTHROSCOPY KNEE      BREAST BIOPSY      stereotactic-benign    BREAST BIOPSY      stereo-benign    BREAST EXCISIONAL BIOPSY      unknown date-benign    BREAST EXCISIONAL BIOPSY      unknown date-benign    BREAST EXCISIONAL BIOPSY      unknown date-benign    BREAST EXCISIONAL BIOPSY      unknown age-benign    CHOLECYSTECTOMY      COLONOSCOPY      EXAMINATION UNDER ANESTHESIA N/A 6/24/2021    Procedure: EXAM UNDER ANESTHESIA (EUA), DISE;  Surgeon: Jimmie Gibson MD;  [facility] MA[facility];  Service: ENT    HERNIA REPAIR      HIATAL HERNIA REPAIR      KNEE SURGERY      Torn maniscus lap surg    LIPOSUCTION      LYMPH NODE BIOPSY      MOHS SURGERY  05/20/2021    left mid forehead-Gautam    MOHS SURGERY Left 05/27/2021    Left nasal tip- gautam     WY INCISION IMPLANT CRANIAL NERVE STIM ELECTRODE/PULSE GEN N/A 11/10/2021    Procedure: INSERTION UPPER AIRWAY STIMULATOR, INSPIRE IMPLANT;  Surgeon: Jimmie Gibson MD;  Location[facility] Main [facility];  Service: ENT    REDUCTION MAMMAPLASTY Bilateral 2000    REDUCTION MAMMAPLASTY      SKIN BIOPSY      SKIN CANCER EXCISION  2007    squamous cell carcinoma     SKIN CANCER EXCISION  2007    basal cell carcinoma    SQUAMOUS CELL CARCINOMA EXCISION      TOE SURGERY      TONSILLECTOMY      TUBAL LIGATION      UPPER GASTROINTESTINAL ENDOSCOPY       Family History   Problem Relation Age of Onset    Heart disease Mother     Depression Mother     Hypertension Mother    Kylee Slipper COPD Mother     Hearing loss Mother     Anxiety disorder Mother     Heart disease Father     Lung cancer Father 79        Smoker     Cancer Father         brain    Alcohol abuse Father     Dementia Father     Hypertension Father     Thyroid disease Father    Kylee Slipper COPD Father    Kylee Slipper Arthritis Father     Brain cancer Father 76    Hypertension Sister     Diabetes Sister     Heart disease Sister     Thyroid disease Sister     Cancer Sister         Lympoma    Lung cancer Sister 78    Anxiety disorder Sister     Hypertension Brother     Diabetes Brother     Cancer Brother         Throat    Dementia Brother     Stroke Brother     Hypertension Brother     No Known Problems Son     No Known Problems Son     Heart disease Brother     Diabetes Brother     Brain cancer Paternal Aunt         unknown age      reports that she quit smoking about 46 years ago  Her smoking use included cigarettes  She started smoking about 54 years ago  She has a 20 00 pack-year smoking history  She quit smokeless tobacco use about 45 years ago  She reports previous alcohol use  She reports that she does not use drugs  I COMPLETELY REVIEWED THE PAST MEDICAL HISTORY/PAST SURGICAL HISTORY/SOCIAL HISTORY/FAMILY HISTORY/AND MEDICATIONS  AND UPDATED ALL    Objective:     Vitals:   BP sitting on right:  146/72 with a heart rate of 68 and regular  BP standing on right:  120/76 with a heart rate of 64 regular    Weight (last 2 days)     Date/Time Weight    05/19/22 1325 74 6 (164 4)        Wt Readings from Last 3 Encounters:   05/19/22 74 6 kg (164 lb 6 4 oz)   05/03/22 75 5 kg (166 lb 6 4 oz)   04/28/22 75 kg (165 lb 6 4 oz)       Body mass index is 29 12 kg/m²      Physical Exam: General:  No acute distress  Skin:  No acute rash  Eyes:  No scleral icterus, noninjected, no discharge from eyes  ENT:  Moist mucous membranes  Neck:  Supple, no jugular venous distention, trachea is midline, no lymphadenopathy and no thyromegaly  Back   No CVAT  Chest:  Clear to auscultation and percussion, good respiratory effort  CVS:  Regular rate and rhythm without a rub, or gallops or murmurs  Abdomen:  Obese,Soft and nontender with normal bowel sounds  Extremities:  No cyanosis and no edema, moderate  arthritic changes, normal range of motion  Neuro:  Grossly intact  Psych:  Alert, oriented x3 and appropriate      Medications:    Current Outpatient Medications:     acetaminophen (TYLENOL) 325 mg tablet, Take 2 tablets (650 mg total) by mouth every 6 (six) hours as needed for mild pain, headaches or fever, Disp: , Rfl: 0    albuterol (Ventolin HFA) 90 mcg/act inhaler, Inhale 2 puffs every 6 (six) hours as needed for wheezing or shortness of breath, Disp: , Rfl: 0    amLODIPine (NORVASC) 5 mg tablet, Take 1 tablet (5 mg total) by mouth daily, Disp: 90 tablet, Rfl: 0    b complex vitamins tablet, Take 1 tablet by mouth daily , Disp: , Rfl:     B-D ULTRAFINE III SHORT PEN 31G X 8 MM MISC, USE AS DIRECTED 4 TIMES PER DAY, Disp: , Rfl: 3    DULoxetine HCl (CYMBALTA PO), Take 40 mg by mouth 2 (two) times a day, Disp: , Rfl:     famotidine (PEPCID) 10 mg tablet, Take 10 mg by mouth in the morning and 10 mg in the evening , Disp: , Rfl:     fluticasone (FLONASE) 50 mcg/act nasal spray, 1-2 sprays into each nostril daily, Disp: 18 2 mL, Rfl: 5    gabapentin (NEURONTIN) 100 mg capsule, Take 100 mg by mouth daily, Disp: , Rfl:     insulin aspart (NovoLOG) 100 units/mL injection, Inject under the skin 3 (three) times a day before meals 14 units, 14 units, 18 units  , Disp: , Rfl:     insulin detemir (Levemir FlexTouch) 100 Units/mL injection pen, Inject 50 Units under the skin every evening , Disp: , Rfl:     Krill Oil (Omega-3) 500 MG CAPS, Take 1,000 mg by mouth 2 (two) times a day, Disp: , Rfl:     levothyroxine 112 mcg tablet, Take 112 mcg by mouth every morning, Disp: , Rfl:     metoprolol tartrate (LOPRESSOR) 50 mg tablet, TAKE 1 TABLET (50 MG TOTAL) BY MOUTH EVERY 12 (TWELVE) HOURS, Disp: 180 tablet, Rfl: 3    olmesartan (BENICAR) 20 mg tablet, Take 0 5 tablets (10 mg total) by mouth daily, Disp: 30 tablet, Rfl: 4    pramipexole (MIRAPEX) 0 25 mg tablet, Take 0 25 mg by mouth in the morning , Disp: , Rfl:     rosuvastatin (CRESTOR) 20 MG tablet, Take 1 tablet (20 mg total) by mouth daily, Disp: 30 tablet, Rfl: 0    torsemide (DEMADEX) 20 mg tablet, TAKE 1 TABLET BY MOUTH EVERY DAY, Disp: 90 tablet, Rfl: 3    Vitamin D, Ergocalciferol, 2000 units CAPS, Take 2,000 Units by mouth daily , Disp: , Rfl:     Lab, Imaging and other studies: I have personally reviewed pertinent labs  Laboratory Results:  Results for orders placed or performed in visit on 04/29/22   Protein / creatinine ratio, urine   Result Value Ref Range    Creatinine, Ur 100 0 mg/dL    Protein Urine Random 73 mg/dL    Prot/Creat Ratio, Ur 0 73 (H) 0 00 - 0 10   Comprehensive metabolic panel   Result Value Ref Range    Sodium 140 136 - 145 mmol/L    Potassium 4 1 3 5 - 5 3 mmol/L    Chloride 108 100 - 108 mmol/L    CO2 26 21 - 32 mmol/L    ANION GAP 6 4 - 13 mmol/L    BUN 39 (H) 5 - 25 mg/dL    Creatinine 1 62 (H) 0 60 - 1 30 mg/dL    Glucose, Fasting 162 (H) 65 - 99 mg/dL    Calcium 9 3 8 3 - 10 1 mg/dL    AST 31 5 - 45 U/L    ALT 50 12 - 78 U/L    Alkaline Phosphatase 89 46 - 116 U/L    Total Protein 6 8 6 4 - 8 2 g/dL    Albumin 3 6 3 5 - 5 0 g/dL    Total Bilirubin 0 49 0 20 - 1 00 mg/dL    eGFR 31 ml/min/1 73sq m   Magnesium   Result Value Ref Range    Magnesium 2 1 1 6 - 2 6 mg/dL   Phosphorus   Result Value Ref Range    Phosphorus 3 8 2 3 - 4 1 mg/dL   CBC and Platelet   Result Value Ref Range    WBC 7 70 4 31 - 10 16 Thousand/uL    RBC 4 12 3 81 - 5 12 Million/uL    Hemoglobin 12 2 11 5 - 15 4 g/dL    Hematocrit 36 7 34 8 - 46 1 %    MCV 89 82 - 98 fL    MCH 29 6 26 8 - 34 3 pg    MCHC 33 2 31 4 - 37 4 g/dL    RDW 14 0 11 6 - 15 1 %    Platelets 843 036 - 395 Thousands/uL    MPV 11 0 8 9 - 12 7 fL   CK   Result Value Ref Range    Total CK 41 26 - 192 U/L   PTH, intact   Result Value Ref Range     0 (H) 18 4 - 80 1 pg/mL   Vitamin D 25 hydroxy   Result Value Ref Range    Vit D, 25-Hydroxy 40 8 30 0 - 100 0 ng/mL             Invalid input(s): ALBUMIN      Radiology review:   chest X-ray    Ultrasound      Portions of the record may have been created with voice recognition software    Occasional wrong word or "sound a like" substitutions may have occurred due to the inherent limitations of voice recognition software  Read the chart carefully and recognize, using context, where substitutions have occurred

## 2022-05-12 DIAGNOSIS — I10 ESSENTIAL HYPERTENSION: ICD-10-CM

## 2022-05-12 RX ORDER — METOPROLOL TARTRATE 50 MG/1
50 TABLET, FILM COATED ORAL EVERY 12 HOURS SCHEDULED
Qty: 180 TABLET | Refills: 3 | Status: SHIPPED | OUTPATIENT
Start: 2022-05-12

## 2022-05-19 ENCOUNTER — APPOINTMENT (OUTPATIENT)
Dept: LAB | Facility: AMBULARY SURGERY CENTER | Age: 72
End: 2022-05-19
Payer: MEDICARE

## 2022-05-19 ENCOUNTER — OFFICE VISIT (OUTPATIENT)
Dept: NEPHROLOGY | Facility: CLINIC | Age: 72
End: 2022-05-19
Payer: MEDICARE

## 2022-05-19 VITALS — WEIGHT: 164.4 LBS | BODY MASS INDEX: 29.13 KG/M2 | HEIGHT: 63 IN

## 2022-05-19 DIAGNOSIS — R80.1 PERSISTENT PROTEINURIA: ICD-10-CM

## 2022-05-19 DIAGNOSIS — E11.21 DIABETIC NEPHROPATHY ASSOCIATED WITH TYPE 2 DIABETES MELLITUS (HCC): ICD-10-CM

## 2022-05-19 DIAGNOSIS — Z87.442 HISTORY OF KIDNEY STONES: ICD-10-CM

## 2022-05-19 DIAGNOSIS — E55.9 VITAMIN D DEFICIENCY: ICD-10-CM

## 2022-05-19 DIAGNOSIS — N18.4 STAGE 4 CHRONIC KIDNEY DISEASE (HCC): ICD-10-CM

## 2022-05-19 DIAGNOSIS — N18.4 ANEMIA IN STAGE 4 CHRONIC KIDNEY DISEASE (HCC): ICD-10-CM

## 2022-05-19 DIAGNOSIS — D63.1 ANEMIA IN STAGE 4 CHRONIC KIDNEY DISEASE (HCC): ICD-10-CM

## 2022-05-19 DIAGNOSIS — E78.5 DYSLIPIDEMIA: ICD-10-CM

## 2022-05-19 DIAGNOSIS — I12.9 HYPERTENSIVE CHRONIC KIDNEY DISEASE WITH STAGE 1 THROUGH STAGE 4 CHRONIC KIDNEY DISEASE, OR UNSPECIFIED CHRONIC KIDNEY DISEASE: Primary | ICD-10-CM

## 2022-05-19 DIAGNOSIS — N18.31 CHRONIC KIDNEY DISEASE (CKD) STAGE G3A/A1, MODERATELY DECREASED GLOMERULAR FILTRATION RATE (GFR) BETWEEN 45-59 ML/MIN/1.73 SQUARE METER AND ALBUMINURIA CREATININE RATIO LESS THAN 30 MG/G (HCC): ICD-10-CM

## 2022-05-19 LAB
ALBUMIN SERPL BCP-MCNC: 3.6 G/DL (ref 3.5–5)
ALP SERPL-CCNC: 99 U/L (ref 46–116)
ALT SERPL W P-5'-P-CCNC: 83 U/L (ref 12–78)
ANION GAP SERPL CALCULATED.3IONS-SCNC: 6 MMOL/L (ref 4–13)
AST SERPL W P-5'-P-CCNC: 45 U/L (ref 5–45)
BILIRUB SERPL-MCNC: 0.43 MG/DL (ref 0.2–1)
BUN SERPL-MCNC: 29 MG/DL (ref 5–25)
CALCIUM SERPL-MCNC: 9.7 MG/DL (ref 8.3–10.1)
CHLORIDE SERPL-SCNC: 101 MMOL/L (ref 100–108)
CK SERPL-CCNC: 50 U/L (ref 26–192)
CO2 SERPL-SCNC: 30 MMOL/L (ref 21–32)
CREAT SERPL-MCNC: 1.66 MG/DL (ref 0.6–1.3)
CREAT UR-MCNC: 58.4 MG/DL
ERYTHROCYTE [DISTWIDTH] IN BLOOD BY AUTOMATED COUNT: 13.9 % (ref 11.6–15.1)
GFR SERPL CREATININE-BSD FRML MDRD: 30 ML/MIN/1.73SQ M
GLUCOSE SERPL-MCNC: 119 MG/DL (ref 65–140)
HCT VFR BLD AUTO: 41.6 % (ref 34.8–46.1)
HGB BLD-MCNC: 13.6 G/DL (ref 11.5–15.4)
MAGNESIUM SERPL-MCNC: 2 MG/DL (ref 1.6–2.6)
MCH RBC QN AUTO: 29.3 PG (ref 26.8–34.3)
MCHC RBC AUTO-ENTMCNC: 32.7 G/DL (ref 31.4–37.4)
MCV RBC AUTO: 90 FL (ref 82–98)
PHOSPHATE SERPL-MCNC: 3.7 MG/DL (ref 2.3–4.1)
PLATELET # BLD AUTO: 299 THOUSANDS/UL (ref 149–390)
PMV BLD AUTO: 11.3 FL (ref 8.9–12.7)
POTASSIUM SERPL-SCNC: 3.8 MMOL/L (ref 3.5–5.3)
PROT SERPL-MCNC: 7.2 G/DL (ref 6.4–8.2)
PROT UR-MCNC: 67 MG/DL
PROT/CREAT UR: 1.15 MG/G{CREAT} (ref 0–0.1)
PTH-INTACT SERPL-MCNC: 84.8 PG/ML (ref 18.4–80.1)
RBC # BLD AUTO: 4.64 MILLION/UL (ref 3.81–5.12)
SODIUM SERPL-SCNC: 137 MMOL/L (ref 136–145)
WBC # BLD AUTO: 10.44 THOUSAND/UL (ref 4.31–10.16)

## 2022-05-19 PROCEDURE — 84100 ASSAY OF PHOSPHORUS: CPT

## 2022-05-19 PROCEDURE — 83735 ASSAY OF MAGNESIUM: CPT

## 2022-05-19 PROCEDURE — 83970 ASSAY OF PARATHORMONE: CPT

## 2022-05-19 PROCEDURE — 82570 ASSAY OF URINE CREATININE: CPT

## 2022-05-19 PROCEDURE — 80053 COMPREHEN METABOLIC PANEL: CPT

## 2022-05-19 PROCEDURE — 82550 ASSAY OF CK (CPK): CPT

## 2022-05-19 PROCEDURE — 99214 OFFICE O/P EST MOD 30 MIN: CPT | Performed by: INTERNAL MEDICINE

## 2022-05-19 PROCEDURE — 84156 ASSAY OF PROTEIN URINE: CPT

## 2022-05-19 PROCEDURE — 85027 COMPLETE CBC AUTOMATED: CPT

## 2022-05-19 PROCEDURE — 36415 COLL VENOUS BLD VENIPUNCTURE: CPT

## 2022-05-19 RX ORDER — PRAMIPEXOLE DIHYDROCHLORIDE 0.25 MG/1
0.25 TABLET ORAL
COMMUNITY
End: 2022-08-04 | Stop reason: SDUPTHER

## 2022-05-19 RX ORDER — FAMOTIDINE 10 MG
20 TABLET ORAL
COMMUNITY
End: 2022-08-05 | Stop reason: SDUPTHER

## 2022-05-19 NOTE — PATIENT INSTRUCTIONS
1  Medication changes today:  No medication changes today  2  Please go for non fasting  lab work at this time    3  Please take 1 week a blood pressure readings  prior to your next visit     AS FOLLOWS  MORNING AND EVENING, SITTING AND STANDING as follows:  TAKE THE MORNING READINGS BEFORE ANY MEDICATIONS AND WHEN YOU ARE RELAXED FOR SEVERAL MINUTES  TAKE THE EVENING READINGS:  BETWEEN 7-10 P M ; PRIOR TO ANY MEDICATIONS; AT LEAST IN OUR  FROM DINNER; AND CERTAINLY AFTER RELAXING FOR A FEW MINUTES  PLEASE INCLUDE HEART RATE WITH YOUR BLOOD PRESSURE READINGS  When taking standing readings, keep your arm supported at heart level and not dangling  Make sure you are sitting with your back supported and feet on the ground and do not cross your legs or feet  Make sure you have not taken any coffee or caffeine products or exercised or smoke cigarettes at least 30 minutes before taking your blood pressure  Then please mail these readings into the office    PLEASE CALL THE OFFICE IF YOUR BLOOD PRESSURE STAYS ELEVATED OVER 180 AND DOES NOT IMPROVE WITH RELAXATION, OR IF YOUR BLOOD PRESSURE RUNS LOW LESS THAN 100-110 ON THE TOP; AND CERTAINLY IF YOU DEVELOPED ANY DIZZINESS LIGHTHEADEDNESS OR SEVERE HEADACHE OR ANY OTHER CONCERNING SYMPTOMS    4  Follow-up in 4  months  Please bring in 1 week a blood pressure readings morning evening, sitting and standing is outlined above    Please go for fasting lab work 1-2 weeks prior to your appointment      5   General instructions:  AVOID SALT BUT NOT ADDING AN READING LABELS TO MAKE SURE THERE IS LOW-SALT IN THE FOOD THAT YOU ARE EATING  Goal is less than 2 g of sodium intake or less than 5 g of sodium chloride intake per day    Avoid nonsteroidal anti-inflammatory drugs such as Naprosyn, ibuprofen, Aleve, Advil, Celebrex, Meloxicam (Mobic) etc   You can use Tylenol as needed if you do not have any liver condition to be concerned about    Avoid medications such as Sudafed or decongestants and antihistamines that contained the D component which is the decongestant  You can take antihistamines without the decongestant or D component  Try to avoid medications such as pantoprazole or  Protonix/Nexium or Esomeprazole)/Prilosec or omeprazole/Prevacid or lansoprazole/AcipHex or Rabeprazole  If you are able to, use Pepcid as this is safer for your kidneys  Try to exercise at least 30 minutes 3 days a week to begin with with an ultimate goal of 5 days a week for at least 30 minutes    Try to lose 5-10 lb by your next visit    Please do not drink more than 2 glasses of alcohol/wine on a daily basis as this may contribute to your high blood pressure

## 2022-05-19 NOTE — LETTER
May 19, 2022     [address]  [address]  [facility]    Patient: Kp Nguyen   YOB: 1950   Date of Visit: 5/19/2022       Dear Dr Nuvia Dickens: Thank you for referring Rubi Kincaid to me for evaluation  Below are my notes for this consultation  If you have questions, please do not hesitate to call me  I look forward to following your patient along with you  Sincerely,        Mp Stallworth MD        CC: Lyn Nissen, DO Obed Manuel, MD  5/19/2022  1:48 PM  Sign when Signing Visit  RENAL FOLLOW UP NOTE: td     ASSESSMENT AND PLAN:  1  Bernard Limon :  · Etiology:  Diabetic nephropathy/hypertensive nephrosclerosis/arteriolar nephrosclerosis/chronic NSAID use   No evidence of obstructive uropathy, renal artery disease or primary glomerular disease with a bland urinalysis  Of note, CPK was normal at 105 no evidence rhabdomyolysis    · Baseline creatinine:  1 5-2 0  · Current creatinine:  1 62 at baseline  · Urine protein creatinine ratio:  0 73 g at goal!!  · UA demonstrated no significant hematuria but 3+ proteinuria from  · Serologies:  Normal C3/C4; negative rheumatoid factor; negative SPEP:  Negative UPEP;  Light chain ratio 2 09 essentially normal for chronic kidney disease  negative EWELINA; negative hepatitis-C; normal IgA; normal ASO; negative RPR  Recommendations:  · Treat hypertension-please see below  · Treat dyslipidemia-please see below  · Maintain proteinuria less than 1 g or as low as possible  · Avoid nephrotoxic agents such as NSAIDs, patient counseled as such     2   Volume:   · Current status:  Euvolemic  · Torsemide dose:  Continue current dose 20 mg daily  3   Hypertension:  Workup:  · saline suppression test for primary aldosterone state was normal  · plasma free metanephrines was negative  · renal artery duplex was negative 02/2019       Current blood pressure averages:   A m :  132/75, no orthostatic changes  P m :  136/73, no orthostatic changes  Heart rate:  60-70 range        · Goal blood pressure:  less than 130/80 given less than 1 g of proteinuria at this time  Recommendations:  · Push nonmedical regimen including weight loss, isotonic exercise and avoidance of salt; patient counseled as such  · Medication changes today:   · Given lability of her blood pressure specially with recent admission with relative hypotension I would not make any changes but push diet and exercise and avoidance of salt  4   Electrolytes:  all acceptable including a potassium of 4 1   5   Mineral bone disorder:  · Calcium/magnesium/phosphorus:  All acceptable  · PTH intact:  131 0 just monitor for now  · Vitamin-D:  40 8  6   Dyslipidemia:  · Goal LDL:  Less than 100  · Current lipid profile:  LDL not able to calculate/HDL 33/triglycerides 416  Recommendations:  Per Endocrinology but essentially at goal  7   Anemia:   · Current hemoglobin  12 2 essentially normal  8   Other problems:  · Depression  · Diabetes mellitus per primary medical physician  · Hypothyroidism  · BARBARA on CPAP  · Fibromyalgia/osteoarthritis:  Patient with myoclonic movements, seems related to gabapentin it was adjusted with resolution    · Nephrolithiasis  · Basal cell/squamous CA of the skin  · Urinary stress incontinence  · Status post incisional hernia repairs  · hospitalization as outlined below from severe GERD with hypoxemia and shortness of breath from a failed Nissen fundoplication from prior hiatal hernia repair   Patient is due to have further testing with an EGD by GI and then subsequently thoracic surgery consultation for possible repair(however, according to the patient at this juncture she was felt I risk so no surgery is planned at this time)  In addition, she was placed on Protonix 40 b i d  and Pepcid 40 mg hs  · Hospitalization on 07/14/2020 secondary confusion at home   Apparently she had protracted hospital course in [place] following aspiration pneumonia   Ten days in the ICU on a ventilator   No overt infectious process   Low probability for pulmonary embolus despite an elevated D-dimer   Had mild leukocytosis/SIRS criteria subsequently discharged 1 day later when she clinically improved   Symptoms were felt secondary to her protracted ICU course  ·  hospitalization with discharge on 05/18/21:  · Epigastric pain following an EGD on 05/13/2021 for Botox injection at the pylorus for treatment for gastroparesis   Apparently associated with chocolate ingestion and possible cough with aspiration  · Complicated by ANDRA with creatinine to 2 6 which improved with IV fluids TO 1 92 ON 05/18/2021  ·  Hospitalization 04/13/2022: With hypotension fatigue noted to have a low blood pressure mild increasing creatinine, dysuria treated with Bactrim but had an allergic reaction to it as an outpatient she received intravenous ceftriaxone and she was discharged on doxycycline  · Recently placed on spironolactone for blood pressure all medications were held decide from metoprolol  Amlodipine was resumed, torsemide re-initiated, but spironolactone was stopped  Maintain off hydralazine possibly add olmesartan  · Creatinine initially as high as 2 27 reduce down to 2 16 upon discharge     GI health maintenance:  Last colonoscopy:  According to the patient less than 5 years ago; GI has seen her and is planning a colonoscopy ([facility])       PATIENT INSTRUCTIONS:    Patient Instructions   1  Medication changes today:   No medication changes today  2  Please go for non fasting  lab work at this time    3   Please take 1 week a blood pressure readings  prior to your next visit     AS FOLLOWS  MORNING AND EVENING, SITTING AND STANDING as follows:  · TAKE THE MORNING READINGS BEFORE ANY MEDICATIONS AND WHEN YOU ARE RELAXED FOR SEVERAL MINUTES  · TAKE THE EVENING READINGS:  BETWEEN 7-10 P M ; PRIOR TO ANY MEDICATIONS; AT LEAST IN OUR  FROM DINNER; AND CERTAINLY AFTER RELAXING FOR A FEW MINUTES  · PLEASE INCLUDE HEART RATE WITH YOUR BLOOD PRESSURE READINGS  · When taking standing readings, keep your arm supported at heart level and not dangling  · Make sure you are sitting with your back supported and feet on the ground and do not cross your legs or feet  · Make sure you have not taken any coffee or caffeine products or exercised or smoke cigarettes at least 30 minutes before taking your blood pressure  Then please mail these readings into the office    [address] STAYS ELEVATED OVER 180 AND DOES NOT IMPROVE WITH RELAXATION, OR IF YOUR BLOOD PRESSURE RUNS LOW LESS THAN 100-110 ON THE TOP; AND CERTAINLY IF YOU DEVELOPED ANY DIZZINESS LIGHTHEADEDNESS OR SEVERE HEADACHE OR ANY OTHER CONCERNING SYMPTOMS    4  Follow-up in 4  months   Please bring in 1 week a blood pressure readings morning evening, sitting and standing is outlined above     Please go for fasting lab work 1-2 weeks prior to your appointment      5  General instructions:   AVOID SALT BUT NOT ADDING AN READING LABELS TO MAKE SURE THERE IS LOW-SALT IN THE FOOD THAT YOU ARE EATING  o Goal is less than 2 g of sodium intake or less than 5 g of sodium chloride intake per day     Avoid nonsteroidal anti-inflammatory drugs such as Naprosyn, ibuprofen, Aleve, Advil, Celebrex, Meloxicam (Mobic) etc   You can use Tylenol as needed if you do not have any liver condition to be concerned about     Avoid medications such as Sudafed or decongestants and antihistamines that contained the D component which is the decongestant  You can take antihistamines without the decongestant or D component   Try to avoid medications such as pantoprazole or  Protonix/Nexium or Esomeprazole)/Prilosec or omeprazole/Prevacid or lansoprazole/AcipHex or Rabeprazole  If you are able to, use Pepcid as this is safer for your kidneys       Try to exercise at least 30 minutes 3 days a week to begin with with an ultimate goal of 5 days a week for at least 30 minutes     Try to lose 5-10 lb by your next visit     Please do not drink more than 2 glasses of alcohol/wine on a daily basis as this may contribute to your high blood pressure  Subjective: The patient overall is feeling well  No fevers, chills, or cough or colds    Good appetite and good energy  No hematuria, dysuria, voiding symptoms or foamy urine  No gastrointestinal symptoms, except for gastroparesis type symptoms on occasion, receiving Botox  No cardiovascular symptoms including swelling of the legs  No headaches, dizziness or lightheadedness  Blood pressure medications:   Torsemide 20 mg daily in the morning   Olmesartan 10 mg daily in the evening   Metoprolol tartrate 50 mg twice a day   Amlodipine 5 mg daily at dinner time      ROS:  See HPI, otherwise review of systems as completely reviewed with the patient are negative    Past Medical History:   Diagnosis Date    Allergic     Anesthesia     pt reports "had to use double lumen endo tube for hiatal hernia repair/so surgeon could get to where he needed to work"    Arthritis     Aspiration into airway     Basal cell carcinoma 2007    left cheek     BCC (basal cell carcinoma) 05/27/2021    Left Nasal tip    Cancer ([facility] )     squamous cell cancer on forhead    Cancer ([facility] )     basal cell on nose     Chronic kidney disease 2000, 2018    Stones, kidney disease stage 4    Chronic pain disorder     bilat feet and joint pain on occas    Colon polyp     COVID-19 08/2021    recovered at home/did receive monoclonal infusion    CPAP (continuous positive airway pressure) dependence     not using as has been recalled    Dental bridge present     Depression     Diabetes mellitus ([address] )     Type 2    Diabetic neuropathy ([facility] )     Disease of thyroid gland     Family history of thyroid problem     Fatty liver     GERD (gastroesophageal reflux disease)     Heart burn     History of pneumonia     Hyperlipidemia     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[facility]athyroid [facility] [facility]     Hypertension     Kidney problem     Kidney stone     Memory loss Julu2 2020    Motion sickness     Obesity 1978    Obesity (BMI 30 0-34  9)     Pollen allergies     RLS (restless legs syndrome)     SCC (squamous cell carcinoma) 05/04/2021    left mid forehead    Seasonal allergies     Sleep apnea     uses CPAP    Squamous cell skin cancer 2007    left cheek     Swollen ankles     Urinary tract infection 3/28/22    Wears glasses      Past Surgical History:   Procedure Laterality Date    ARTHROSCOPY KNEE      BREAST BIOPSY      stereotactic-benign    BREAST BIOPSY      stereo-benign    BREAST EXCISIONAL BIOPSY      unknown date-benign    BREAST EXCISIONAL BIOPSY      unknown date-benign    BREAST EXCISIONAL BIOPSY      unknown date-benign    BREAST EXCISIONAL BIOPSY      unknown age-benign    CHOLECYSTECTOMY      COLONOSCOPY      EXAMINATION UNDER ANESTHESIA N/A 6/24/2021    Procedure: EXAM UNDER ANESTHESIA (EUA), DISE;  Surgeon: Jones Matson MD;  [facility];  Service: ENT    HERNIA REPAIR      HIATAL HERNIA REPAIR      KNEE SURGERY      Torn maniscus lap surg    LIPOSUCTION      LYMPH NODE BIOPSY      MOHS SURGERY  05/20/2021    left mid forehead-Gautam    MOHS SURGERY Left 05/27/2021    Left nasal tip- gautam     RI INCISION IMPLANT CRANIAL NERVE STIM ELECTRODE/PULSE GEN N/A 11/10/2021    Procedure: INSERTION UPPER AIRWAY STIMULATOR, INSPIRE IMPLANT;  Surgeon: Jones Matson MD;  Location[facility];  Service: ENT    REDUCTION MAMMAPLASTY Bilateral 2000    REDUCTION MAMMAPLASTY      SKIN BIOPSY      SKIN CANCER EXCISION  2007    squamous cell carcinoma     SKIN CANCER EXCISION  2007    basal cell carcinoma    SQUAMOUS CELL CARCINOMA EXCISION      TOE SURGERY      TONSILLECTOMY      TUBAL LIGATION      UPPER GASTROINTESTINAL ENDOSCOPY       Family History   Problem Relation Age of Onset    Heart disease Mother     Depression Mother     Hypertension Mother     COPD Mother     Hearing loss Mother     Anxiety disorder Mother     Heart disease Father     Lung cancer Father 79        Smoker     Cancer Father         brain    Alcohol abuse Father     Dementia Father     Hypertension Father     Thyroid disease Father     COPD Father     Arthritis Father     Brain cancer Father 76    Hypertension Sister     Diabetes Sister     Heart disease Sister     Thyroid disease Sister     Cancer Sister         Lympoma    Lung cancer Sister 78    Anxiety disorder Sister     Hypertension Brother     Diabetes Brother     Cancer Brother         Throat    Dementia Brother     Stroke Brother     Hypertension Brother     No Known Problems Son     No Known Problems Son     Heart disease Brother     Diabetes Brother     Brain cancer Paternal Aunt         unknown age      reports that she quit smoking about 46 years ago  Her smoking use included cigarettes  She started smoking about 54 years ago  She has a 20 00 pack-year smoking history  She quit smokeless tobacco use about 45 years ago  She reports previous alcohol use  She reports that she does not use drugs  I COMPLETELY REVIEWED THE PAST MEDICAL HISTORY/PAST SURGICAL HISTORY/SOCIAL HISTORY/FAMILY HISTORY/AND MEDICATIONS  AND UPDATED ALL    Objective:     Vitals:   BP sitting on right:  146/72 with a heart rate of 68 and regular  BP standing on right:  120/76 with a heart rate of 64 regular    Weight (last 2 days)     Date/Time Weight    05/19/22 1325 74 6 (164 4)        Wt Readings from Last 3 Encounters:   05/19/22 74 6 kg (164 lb 6 4 oz)   05/03/22 75 5 kg (166 lb 6 4 oz)   04/28/22 75 kg (165 lb 6 4 oz)       Body mass index is 29 12 kg/m²      Physical Exam: General:  No acute distress  Skin:  No acute rash  Eyes:  No scleral icterus, noninjected, no discharge from eyes  ENT:  Moist mucous membranes  Neck:  Supple, no jugular venous distention, trachea is midline, no lymphadenopathy and no thyromegaly  Back   No CVAT  Chest:  Clear to auscultation and percussion, good respiratory effort  CVS:  Regular rate and rhythm without a rub, or gallops or murmurs  Abdomen:  Obese,Soft and nontender with normal bowel sounds  Extremities:  No cyanosis and no edema, moderate  arthritic changes, normal range of motion  Neuro:  Grossly intact  Psych:  Alert, oriented x3 and appropriate      Medications:    Current Outpatient Medications:     acetaminophen (TYLENOL) 325 mg tablet, Take 2 tablets (650 mg total) by mouth every 6 (six) hours as needed for mild pain, headaches or fever, Disp: , Rfl: 0    albuterol (Ventolin HFA) 90 mcg/act inhaler, Inhale 2 puffs every 6 (six) hours as needed for wheezing or shortness of breath, Disp: , Rfl: 0    amLODIPine (NORVASC) 5 mg tablet, Take 1 tablet (5 mg total) by mouth daily, Disp: 90 tablet, Rfl: 0    b complex vitamins tablet, Take 1 tablet by mouth daily , Disp: , Rfl:     B-D ULTRAFINE III SHORT PEN 31G X 8 MM MISC, USE AS DIRECTED 4 TIMES PER DAY, Disp: , Rfl: 3    DULoxetine HCl (CYMBALTA PO), Take 40 mg by mouth 2 (two) times a day, Disp: , Rfl:     famotidine (PEPCID) 10 mg tablet, Take 10 mg by mouth in the morning and 10 mg in the evening , Disp: , Rfl:     fluticasone (FLONASE) 50 mcg/act nasal spray, 1-2 sprays into each nostril daily, Disp: 18 2 mL, Rfl: 5    gabapentin (NEURONTIN) 100 mg capsule, Take 100 mg by mouth daily, Disp: , Rfl:     insulin aspart (NovoLOG) 100 units/mL injection, Inject under the skin 3 (three) times a day before meals 14 units, 14 units, 18 units  , Disp: , Rfl:     insulin detemir (Levemir FlexTouch) 100 Units/mL injection pen, Inject 50 Units under the skin every evening , Disp: , Rfl:     Krill Oil (Omega-3) 500 MG CAPS, Take 1,000 mg by mouth 2 (two) times a day, Disp: , Rfl:     levothyroxine 112 mcg tablet, Take 112 mcg by mouth every morning, Disp: , Rfl:    metoprolol tartrate (LOPRESSOR) 50 mg tablet, TAKE 1 TABLET (50 MG TOTAL) BY MOUTH EVERY 12 (TWELVE) HOURS, Disp: 180 tablet, Rfl: 3    olmesartan (BENICAR) 20 mg tablet, Take 0 5 tablets (10 mg total) by mouth daily, Disp: 30 tablet, Rfl: 4    pramipexole (MIRAPEX) 0 25 mg tablet, Take 0 25 mg by mouth in the morning , Disp: , Rfl:     rosuvastatin (CRESTOR) 20 MG tablet, Take 1 tablet (20 mg total) by mouth daily, Disp: 30 tablet, Rfl: 0    torsemide (DEMADEX) 20 mg tablet, TAKE 1 TABLET BY MOUTH EVERY DAY, Disp: 90 tablet, Rfl: 3    Vitamin D, Ergocalciferol, 2000 units CAPS, Take 2,000 Units by mouth daily , Disp: , Rfl:     Lab, Imaging and other studies: I have personally reviewed pertinent labs    Laboratory Results:  Results for orders placed or performed in visit on 04/29/22   Protein / creatinine ratio, urine   Result Value Ref Range    Creatinine, Ur 100 0 mg/dL    Protein Urine Random 73 mg/dL    Prot/Creat Ratio, Ur 0 73 (H) 0 00 - 0 10   Comprehensive metabolic panel   Result Value Ref Range    Sodium 140 136 - 145 mmol/L    Potassium 4 1 3 5 - 5 3 mmol/L    Chloride 108 100 - 108 mmol/L    CO2 26 21 - 32 mmol/L    ANION GAP 6 4 - 13 mmol/L    BUN 39 (H) 5 - 25 mg/dL    Creatinine 1 62 (H) 0 60 - 1 30 mg/dL    Glucose, Fasting 162 (H) 65 - 99 mg/dL    Calcium 9 3 8 3 - 10 1 mg/dL    AST 31 5 - 45 U/L    ALT 50 12 - 78 U/L    Alkaline Phosphatase 89 46 - 116 U/L    Total Protein 6 8 6 4 - 8 2 g/dL    Albumin 3 6 3 5 - 5 0 g/dL    Total Bilirubin 0 49 0 20 - 1 00 mg/dL    eGFR 31 ml/min/1 73sq m   Magnesium   Result Value Ref Range    Magnesium 2 1 1 6 - 2 6 mg/dL   Phosphorus   Result Value Ref Range    Phosphorus 3 8 2 3 - 4 1 mg/dL   CBC and Platelet   Result Value Ref Range    WBC 7 70 4 31 - 10 16 Thousand/uL    RBC 4 12 3 81 - 5 12 Million/uL    Hemoglobin 12 2 11 5 - 15 4 g/dL    Hematocrit 36 7 34 8 - 46 1 %    MCV 89 82 - 98 fL    MCH 29 6 26 8 - 34 3 pg    MCHC 33 2 31 4 - 37 4 g/dL    RDW 14 0 11 6 - 15 1 %    Platelets 281 929 - 841 Thousands/uL    MPV 11 0 8 9 - 12 7 fL   CK   Result Value Ref Range    Total CK 41 26 - 192 U/L   PTH, intact   Result Value Ref Range     0 (H) 18 4 - 80 1 pg/mL   Vitamin D 25 hydroxy   Result Value Ref Range    Vit D, 25-Hydroxy 40 8 30 0 - 100 0 ng/mL             Invalid input(s): ALBUMIN      Radiology review:   chest X-ray    Ultrasound      Portions of the record may have been created with voice recognition software  Occasional wrong word or "sound a like" substitutions may have occurred due to the inherent limitations of voice recognition software  Read the chart carefully and recognize, using context, where substitutions have occurred

## 2022-05-20 ENCOUNTER — TELEPHONE (OUTPATIENT)
Dept: NEPHROLOGY | Facility: CLINIC | Age: 72
End: 2022-05-20

## 2022-05-20 ENCOUNTER — HOSPITAL ENCOUNTER (OUTPATIENT)
Dept: GASTROENTEROLOGY | Facility: AMBULARY SURGERY CENTER | Age: 72
Setting detail: OUTPATIENT SURGERY
Discharge: HOME | End: 2022-05-20
Attending: INTERNAL MEDICINE
Payer: MEDICARE

## 2022-05-20 ENCOUNTER — ANESTHESIA (OUTPATIENT)
Dept: GASTROENTEROLOGY | Facility: AMBULARY SURGERY CENTER | Age: 72
End: 2022-05-20

## 2022-05-20 ENCOUNTER — ANESTHESIA EVENT (OUTPATIENT)
Dept: GASTROENTEROLOGY | Facility: AMBULARY SURGERY CENTER | Age: 72
End: 2022-05-20

## 2022-05-20 VITALS
DIASTOLIC BLOOD PRESSURE: 77 MMHG | HEIGHT: 63 IN | HEART RATE: 66 BPM | WEIGHT: 164 LBS | BODY MASS INDEX: 29.06 KG/M2 | RESPIRATION RATE: 16 BRPM | SYSTOLIC BLOOD PRESSURE: 156 MMHG | TEMPERATURE: 97.7 F | OXYGEN SATURATION: 94 %

## 2022-05-20 DIAGNOSIS — K31.84 GASTROPARESIS DUE TO DM (HCC): ICD-10-CM

## 2022-05-20 DIAGNOSIS — N18.4 STAGE 4 CHRONIC KIDNEY DISEASE (HCC): Primary | ICD-10-CM

## 2022-05-20 DIAGNOSIS — E11.43 GASTROPARESIS DUE TO DM (HCC): ICD-10-CM

## 2022-05-20 DIAGNOSIS — K21.9 GASTROESOPHAGEAL REFLUX DISEASE, UNSPECIFIED WHETHER ESOPHAGITIS PRESENT: ICD-10-CM

## 2022-05-20 PROCEDURE — 43239 EGD BIOPSY SINGLE/MULTIPLE: CPT | Performed by: INTERNAL MEDICINE

## 2022-05-20 PROCEDURE — 43236 UPPR GI SCOPE W/SUBMUC INJ: CPT | Performed by: INTERNAL MEDICINE

## 2022-05-20 PROCEDURE — 88305 TISSUE EXAM BY PATHOLOGIST: CPT | Performed by: PATHOLOGY

## 2022-05-20 RX ORDER — PANTOPRAZOLE SODIUM 40 MG/1
40 TABLET, DELAYED RELEASE ORAL DAILY
Qty: 90 TABLET | Refills: 0 | Status: SHIPPED | OUTPATIENT
Start: 2022-05-20 | End: 2022-08-05 | Stop reason: SDUPTHER

## 2022-05-20 RX ORDER — LIDOCAINE HYDROCHLORIDE 10 MG/ML
INJECTION, SOLUTION EPIDURAL; INFILTRATION; INTRACAUDAL; PERINEURAL AS NEEDED
Status: DISCONTINUED | OUTPATIENT
Start: 2022-05-20 | End: 2022-05-20

## 2022-05-20 RX ORDER — SODIUM CHLORIDE, SODIUM LACTATE, POTASSIUM CHLORIDE, CALCIUM CHLORIDE 600; 310; 30; 20 MG/100ML; MG/100ML; MG/100ML; MG/100ML
50 INJECTION, SOLUTION INTRAVENOUS CONTINUOUS
Status: DISCONTINUED | OUTPATIENT
Start: 2022-05-20 | End: 2022-05-24 | Stop reason: HOSPADM

## 2022-05-20 RX ORDER — PROPOFOL 10 MG/ML
INJECTION, EMULSION INTRAVENOUS AS NEEDED
Status: DISCONTINUED | OUTPATIENT
Start: 2022-05-20 | End: 2022-05-20

## 2022-05-20 RX ADMIN — ONABOTULINUMTOXINA 200 UNITS: 100 INJECTION, POWDER, LYOPHILIZED, FOR SOLUTION INTRADERMAL; INTRAMUSCULAR at 07:40

## 2022-05-20 RX ADMIN — LIDOCAINE HYDROCHLORIDE 50 MG: 10 INJECTION, SOLUTION EPIDURAL; INFILTRATION; INTRACAUDAL; PERINEURAL at 07:37

## 2022-05-20 RX ADMIN — SODIUM CHLORIDE, SODIUM LACTATE, POTASSIUM CHLORIDE, AND CALCIUM CHLORIDE: .6; .31; .03; .02 INJECTION, SOLUTION INTRAVENOUS at 07:35

## 2022-05-20 RX ADMIN — PROPOFOL 50 MG: 10 INJECTION, EMULSION INTRAVENOUS at 07:41

## 2022-05-20 RX ADMIN — PROPOFOL 100 MG: 10 INJECTION, EMULSION INTRAVENOUS at 07:37

## 2022-05-20 NOTE — TELEPHONE ENCOUNTER
Spoke with patient about the following recommendations, she expressed understanding and thanked us for the call:  Renal function remains state  Proteins elevated  Slight increase liver function study  Recommend:  1  Please increase olmesartan to 10 mg twice a day  2  Repeat a CMP 1-2 weeks after making these above changes  3   Have the patient do week a blood pressure readings 2-3 weeks after making these changes sitting and standing

## 2022-05-20 NOTE — ANESTHESIA POSTPROCEDURE EVALUATION
Post-Op Assessment Note    CV Status:  Stable  Pain Score: 0    Pain management: adequate     Mental Status:  Alert and awake   Hydration Status:  Euvolemic   PONV Controlled:  Controlled   Airway Patency:  Patent      Post Op Vitals Reviewed: Yes      Staff: Anesthesiologist, CRNA         No complications documented      /67 (05/20/22 0752)    Temp 97 7 °F (36 5 °C) (05/20/22 0752)    Pulse 66 (05/20/22 0752)   Resp 16 (05/20/22 0752)    SpO2 99 % (05/20/22 0752)

## 2022-05-20 NOTE — H&P
History and Physical -  [facility]  Summer Dawson 67 y o  female MRN: [id number]    HPI: Summer Dawson is a 67y o  year old female who presents elevation of gastroparesis  Review of Systems    Historical Information   Past Medical History:   Diagnosis Date    Allergic     Anesthesia     pt reports "had to use double lumen endo tube for hiatal hernia repair/so surgeon could get to where he needed to work"    Arthritis     Aspiration into airway     Basal cell carcinoma 2007    left cheek     BCC (basal cell carcinoma) 05/27/2021    Left Nasal tip    Cancer ([facility] )     squamous cell cancer on forhead    Cancer ([facility] )     basal cell on nose     Chronic kidney disease 2000, 2018    Stones, kidney disease stage 4    Chronic pain disorder     bilat feet and joint pain on occas    Colon polyp     COVID-19 08/2021    recovered at home/did receive monoclonal infusion    Dental bridge present     Depression     Diabetes mellitus ([place] [address] )     Type 2    Diabetic neuropathy ([place] [address] )     Disease of thyroid gland     Family history of thyroid problem     Fatty liver     GERD (gastroesophageal reflux disease)     Heart burn     Hiatal hernia     History of pneumonia     Hyperlipidemia     Hyperplasia, parathyroid ([place] [address] )     Hypertension     Kidney problem     Kidney stone     Memory loss Julu2 2020    Motion sickness     Obesity 1978    Obesity (BMI 30 0-34  9)     Pollen allergies     RLS (restless legs syndrome)     SCC (squamous cell carcinoma) 05/04/2021    left mid forehead    Seasonal allergies     Sleep apnea     uses CPAP    Squamous cell skin cancer 2007    left cheek     Swollen ankles     Urinary tract infection 03/28/2022    Wears glasses      Past Surgical History:   Procedure Laterality Date    ARTHROSCOPY KNEE      BREAST BIOPSY      stereotactic-benign    BREAST BIOPSY      stereo-benign    BREAST EXCISIONAL BIOPSY      unknown date-benign    BREAST EXCISIONAL BIOPSY      unknown date-benign    BREAST EXCISIONAL BIOPSY      unknown date-benign    BREAST EXCISIONAL BIOPSY      unknown age-benign    CHOLECYSTECTOMY      COLONOSCOPY      EXAMINATION UNDER ANESTHESIA N/A 2021    Procedure: EXAM UNDER ANESTHESIA (EUA), DISE;  Surgeon: Jimmie Gibson MD;  [facility];  Service: ENT    HERNIA REPAIR      HIATAL HERNIA REPAIR      KNEE SURGERY      Torn maniscus lap surg    LIPOSUCTION      LYMPH NODE BIOPSY      MOHS SURGERY  2021    left mid forehead-Gautam    MOHS SURGERY Left 2021    Left nasal tip- gautam     MS INCISION IMPLANT CRANIAL NERVE STIM ELECTRODE/PULSE GEN N/A 11/10/2021    Procedure: INSERTION UPPER AIRWAY STIMULATOR, INSPIRE IMPLANT;  Surgeon: Jimmie Gibson MD;  [facility];  Service: ENT    REDUCTION MAMMAPLASTY Bilateral 2000    REDUCTION MAMMAPLASTY      SKIN BIOPSY      SKIN CANCER EXCISION  2007    squamous cell carcinoma     SKIN CANCER EXCISION  2007    basal cell carcinoma    SQUAMOUS CELL CARCINOMA EXCISION      TOE SURGERY      TONSILLECTOMY      TUBAL LIGATION      UPPER GASTROINTESTINAL ENDOSCOPY       Social History   Social History     Substance and Sexual Activity   Alcohol Use Yes    Comment: Social     Social History     Substance and Sexual Activity   Drug Use No     Social History     Tobacco Use   Smoking Status Former Smoker    Packs/day: 2 00    Years: 10 00    Pack years: 20 00    Types: Cigarettes    Start date: 1/10/1968   Kylee Slipper Quit date: Deepa Singletary Years since quittin 3   Smokeless Tobacco Former User    Quit date: 1976   Tobacco Comment    Smoked 2 pack a day     Family History   Problem Relation Age of Onset    Heart disease Mother     Depression Mother     Hypertension Mother     COPD Mother     Hearing loss Mother     Anxiety disorder Mother     Heart disease Father     Lung cancer Father 79        Smoker     Cancer Father         brain  Alcohol abuse Father     Dementia Father     Hypertension Father     Thyroid disease Father     COPD Father     Arthritis Father     Brain cancer Father 76    Hypertension Sister     Diabetes Sister     Heart disease Sister     Thyroid disease Sister     Cancer Sister         Lympoma    Lung cancer Sister 78    Anxiety disorder Sister     Hypertension Brother     Diabetes Brother     Cancer Brother         Throat    Dementia Brother     Stroke Brother     Hypertension Brother     No Known Problems Son     No Known Problems Son     Heart disease Brother     Diabetes Brother     Brain cancer Paternal [de-identified]         unknown age       Meds/Allergies     (Not in a hospital admission)      Allergies   Allergen Reactions    Sulfamethoxazole-Trimethoprim Other (See Comments)     Tongue swelling    Ciprofloxacin Hives and Itching       Objective     /79   Pulse 64   Temp 97 5 °F (36 4 °C) (Temporal)   Resp 18   Ht 5' 3" (1 6 m)   Wt 74 4 kg (164 lb)   SpO2 94%   BMI 29 05 kg/m²       PHYSICAL EXAM    Gen: NAD  CV: RRR  CHEST: Clear  ABD: soft, NT/ND  EXT: no edema  Neuro: AAO      ASSESSMENT/PLAN:  This is a 67y o  year old female here for evaluation of gastroparesis      PLAN:   Procedure:  EGD with Botox injection

## 2022-05-20 NOTE — TELEPHONE ENCOUNTER
----- Message from Xin Crowe MD sent at 5/20/2022  8:04 AM EDT -----  Renal function remains state  Proteins elevated  Slight increase liver function study  Recommend:  1  Please increase olmesartan to 10 mg twice a day  2  Repeat a CMP 1-2 weeks after making these above changes  3   Have the patient do week a blood pressure readings 2-3 weeks after making these changes sitting and standing   Thank you

## 2022-05-20 NOTE — ANESTHESIA PREPROCEDURE EVALUATION
Procedure:  EGD    Relevant Problems   CARDIO   (+) Other chest pain   (+) Pulmonary hypertension (HCC)      ENDO   (+) Type 2 diabetes mellitus with diabetic chronic kidney disease (HCC)   (+) Type 2 diabetes mellitus with diabetic nephropathy, with long-term current use of insulin (HCC)      GI/HEPATIC   (+) GERD (gastroesophageal reflux disease)      /RENAL   (+) ANDRA (acute kidney injury) (HCC)   (+) Anemia of chronic renal failure, stage 3 (moderate) (HCC)   (+) Diabetic nephropathy associated with type 2 diabetes mellitus (HCC)   (+) Hypertensive chronic kidney disease with stage 1 through stage 4 chronic kidney disease, or unspecified chronic kidney disease   (+) Stage 4 chronic kidney disease (HCC)      HEMATOLOGY   (+) Anemia in stage 4 chronic kidney disease (HCC)   (+) Anemia of chronic renal failure, stage 3 (moderate) (HCC)      MUSCULOSKELETAL   (+) Fibromyalgia      NEURO/PSYCH   (+) Depression, recurrent (HCC)   (+) Fibromyalgia   (+) Gastroparesis diabeticorum (HCC)   (+) History of colon polyps   (+) History of kidney stones      PULMONARY   (+) COPD (chronic obstructive pulmonary disease) (HCC)   (+) Chronic respiratory failure with hypoxia (HCC)   (+) Dyspnea   (+) BARBARA (obstructive sleep apnea)      SUMMARY:  -  Stress results: There was no chest pain during stress  -  ECG conclusions: The stress ECG was negative for ischemia and normal   -  Perfusion imaging: There were no perfusion defects   -  Gated SPECT: The calculated left ventricular ejection fraction was 58 %  Left ventricular ejection fraction was within normal limits by visual estimate  There was no left ventricular regional abnormality      IMPRESSIONS: Normal study after pharmacologic vasodilation without reproduction of symptoms  Myocardial perfusion imaging was normal at rest and with stress   Left ventricular systolic function was normal   Physical Exam    Airway    Mallampati score: II  TM Distance: >3 FB  Neck ROM: full Dental       Cardiovascular      Pulmonary      Other Findings        Anesthesia Plan  ASA Score- 2     Anesthesia Type- IV sedation with anesthesia with ASA Monitors  Additional Monitors:   Airway Plan:           Plan Factors-    Induction- intravenous  Postoperative Plan-     Informed Consent- Anesthetic plan and risks discussed with patient  I personally reviewed this patient with the CRNA  Discussed and agreed on the Anesthesia Plan with the CRNA  Wendy Mallory

## 2022-05-27 ENCOUNTER — APPOINTMENT (OUTPATIENT)
Dept: LAB | Facility: AMBULARY SURGERY CENTER | Age: 72
End: 2022-05-27
Payer: MEDICARE

## 2022-05-27 DIAGNOSIS — N18.4 STAGE 4 CHRONIC KIDNEY DISEASE (HCC): ICD-10-CM

## 2022-05-27 LAB
ALBUMIN SERPL BCP-MCNC: 3.5 G/DL (ref 3.5–5)
ALP SERPL-CCNC: 99 U/L (ref 46–116)
ALT SERPL W P-5'-P-CCNC: 49 U/L (ref 12–78)
ANION GAP SERPL CALCULATED.3IONS-SCNC: 6 MMOL/L (ref 4–13)
AST SERPL W P-5'-P-CCNC: 24 U/L (ref 5–45)
BILIRUB SERPL-MCNC: 0.4 MG/DL (ref 0.2–1)
BUN SERPL-MCNC: 34 MG/DL (ref 5–25)
CALCIUM SERPL-MCNC: 9.6 MG/DL (ref 8.3–10.1)
CHLORIDE SERPL-SCNC: 104 MMOL/L (ref 100–108)
CO2 SERPL-SCNC: 31 MMOL/L (ref 21–32)
CREAT SERPL-MCNC: 1.64 MG/DL (ref 0.6–1.3)
GFR SERPL CREATININE-BSD FRML MDRD: 30 ML/MIN/1.73SQ M
GLUCOSE P FAST SERPL-MCNC: 174 MG/DL (ref 65–99)
POTASSIUM SERPL-SCNC: 3.9 MMOL/L (ref 3.5–5.3)
PROT SERPL-MCNC: 7.1 G/DL (ref 6.4–8.2)
SODIUM SERPL-SCNC: 141 MMOL/L (ref 136–145)

## 2022-05-27 PROCEDURE — 36415 COLL VENOUS BLD VENIPUNCTURE: CPT

## 2022-05-27 PROCEDURE — 80053 COMPREHEN METABOLIC PANEL: CPT

## 2022-05-31 ENCOUNTER — TELEPHONE (OUTPATIENT)
Dept: NEPHROLOGY | Facility: CLINIC | Age: 72
End: 2022-05-31

## 2022-05-31 NOTE — TELEPHONE ENCOUNTER
----- Message from Kai Perez MD sent at 5/27/2022  5:07 PM EDT -----  Creatinine 1 6 stable at baseline

## 2022-06-03 DIAGNOSIS — I10 ESSENTIAL HYPERTENSION: ICD-10-CM

## 2022-06-03 DIAGNOSIS — I12.9 HYPERTENSIVE CHRONIC KIDNEY DISEASE WITH STAGE 1 THROUGH STAGE 4 CHRONIC KIDNEY DISEASE, OR UNSPECIFIED CHRONIC KIDNEY DISEASE: ICD-10-CM

## 2022-06-03 DIAGNOSIS — N18.30 CHRONIC KIDNEY DISEASE, STAGE III (MODERATE) (HCC): ICD-10-CM

## 2022-06-05 RX ORDER — AMLODIPINE BESYLATE 5 MG/1
5 TABLET ORAL DAILY
Qty: 90 TABLET | Refills: 0 | Status: SHIPPED | OUTPATIENT
Start: 2022-06-05

## 2022-06-16 ENCOUNTER — HOSPITAL ENCOUNTER (OUTPATIENT)
Dept: NON INVASIVE DIAGNOSTICS | Facility: CLINIC | Age: 72
Discharge: HOME | End: 2022-06-16
Payer: MEDICARE

## 2022-06-16 DIAGNOSIS — M20.21 HALLUX RIGIDUS OF RIGHT FOOT: ICD-10-CM

## 2022-06-16 PROCEDURE — 93923 UPR/LXTR ART STDY 3+ LVLS: CPT

## 2022-06-16 PROCEDURE — 93922 UPR/L XTREMITY ART 2 LEVELS: CPT | Performed by: SURGERY

## 2022-06-16 PROCEDURE — 93925 LOWER EXTREMITY STUDY: CPT

## 2022-06-16 PROCEDURE — 93925 LOWER EXTREMITY STUDY: CPT | Performed by: SURGERY

## 2022-06-23 DIAGNOSIS — I12.9 HYPERTENSIVE CHRONIC KIDNEY DISEASE WITH STAGE 1 THROUGH STAGE 4 CHRONIC KIDNEY DISEASE, OR UNSPECIFIED CHRONIC KIDNEY DISEASE: ICD-10-CM

## 2022-06-23 DIAGNOSIS — N18.31 CHRONIC KIDNEY DISEASE (CKD) STAGE G3A/A1, MODERATELY DECREASED GLOMERULAR FILTRATION RATE (GFR) BETWEEN 45-59 ML/MIN/1.73 SQUARE METER AND ALBUMINURIA CREATININE RATIO LESS THAN 30 MG/G (HCC): ICD-10-CM

## 2022-06-23 RX ORDER — OLMESARTAN MEDOXOMIL 20 MG/1
10 TABLET ORAL 2 TIMES DAILY
Qty: 90 TABLET | Refills: 3 | Status: SHIPPED | OUTPATIENT
Start: 2022-06-23

## 2022-06-23 RX ORDER — OLMESARTAN MEDOXOMIL 20 MG/1
10 TABLET ORAL DAILY
Qty: 90 TABLET | Refills: 3 | Status: SHIPPED | OUTPATIENT
Start: 2022-06-23 | End: 2022-06-23 | Stop reason: SDUPTHER

## 2022-06-24 ENCOUNTER — TELEPHONE (OUTPATIENT)
Dept: FAMILY MEDICINE CLINIC | Facility: CLINIC | Age: 72
End: 2022-06-24

## 2022-06-24 NOTE — TELEPHONE ENCOUNTER
Pt is having R 1st metatarsil phalangeal joint fusion on July 19th Dr Ramon Palacios [place] [facility] [place] will need to get labs and she has a form for us to fill out  Can you please look at your schedule to see where we can place an appointment for the pt

## 2022-06-28 NOTE — TELEPHONE ENCOUNTER
Ok to use TCM on 7/12  Advise her that she may want to touch base with nephrology and endocrine to for any preoperative recommendations

## 2022-06-29 ENCOUNTER — TELEPHONE (OUTPATIENT)
Dept: FAMILY MEDICINE CLINIC | Facility: CLINIC | Age: 72
End: 2022-06-29

## 2022-06-29 NOTE — TELEPHONE ENCOUNTER
Spoke to patient and obtained the below Pre-op information:    Name of procedure: 1st metatarsil phalangeal joint fusion   Diagnosis:   Date of procedure (within 30 days): 7/19  Surgeon: Dr Jeanie Kelley  Location of procedure (hospital or out patient facility name): [facility] date/location/ type (Which labs? EKG?  CXR?): lab work @ [facility] (on epic or requested): EPIC  Type of anaesthesia:  general  Paperwork to complete for Pre-op (patient bringing vs  calling office to obtain prior to appointment): bringing form  Pre-op appointment scheduled (date/time): 7/12

## 2022-06-30 ENCOUNTER — APPOINTMENT (OUTPATIENT)
Dept: LAB | Facility: AMBULARY SURGERY CENTER | Age: 72
End: 2022-06-30
Payer: MEDICARE

## 2022-06-30 ENCOUNTER — TELEPHONE (OUTPATIENT)
Dept: FAMILY MEDICINE CLINIC | Facility: CLINIC | Age: 72
End: 2022-06-30

## 2022-06-30 DIAGNOSIS — Z01.89 RADIOLOGICAL EXAMINATION, NOT ELSEWHERE CLASSIFIED: ICD-10-CM

## 2022-06-30 DIAGNOSIS — R82.90 ABNORMAL URINALYSIS: Primary | ICD-10-CM

## 2022-06-30 DIAGNOSIS — R82.90 ABNORMAL URINALYSIS: ICD-10-CM

## 2022-06-30 LAB
APTT PPP: 27 SECONDS (ref 23–37)
BASOPHILS # BLD AUTO: 0.03 THOUSANDS/ΜL (ref 0–0.1)
BASOPHILS NFR BLD AUTO: 0 % (ref 0–1)
EOSINOPHIL # BLD AUTO: 0.2 THOUSAND/ΜL (ref 0–0.61)
EOSINOPHIL NFR BLD AUTO: 2 % (ref 0–6)
ERYTHROCYTE [DISTWIDTH] IN BLOOD BY AUTOMATED COUNT: 14 % (ref 11.6–15.1)
HCT VFR BLD AUTO: 41.4 % (ref 34.8–46.1)
HGB BLD-MCNC: 13.5 G/DL (ref 11.5–15.4)
IMM GRANULOCYTES # BLD AUTO: 0.03 THOUSAND/UL (ref 0–0.2)
IMM GRANULOCYTES NFR BLD AUTO: 0 % (ref 0–2)
LYMPHOCYTES # BLD AUTO: 2.16 THOUSANDS/ΜL (ref 0.6–4.47)
LYMPHOCYTES NFR BLD AUTO: 25 % (ref 14–44)
MCH RBC QN AUTO: 28.8 PG (ref 26.8–34.3)
MCHC RBC AUTO-ENTMCNC: 32.6 G/DL (ref 31.4–37.4)
MCV RBC AUTO: 88 FL (ref 82–98)
MONOCYTES # BLD AUTO: 0.64 THOUSAND/ΜL (ref 0.17–1.22)
MONOCYTES NFR BLD AUTO: 7 % (ref 4–12)
NEUTROPHILS # BLD AUTO: 5.55 THOUSANDS/ΜL (ref 1.85–7.62)
NEUTS SEG NFR BLD AUTO: 66 % (ref 43–75)
NRBC BLD AUTO-RTO: 0 /100 WBCS
PLATELET # BLD AUTO: 261 THOUSANDS/UL (ref 149–390)
PMV BLD AUTO: 11.9 FL (ref 8.9–12.7)
RBC # BLD AUTO: 4.69 MILLION/UL (ref 3.81–5.12)
WBC # BLD AUTO: 8.61 THOUSAND/UL (ref 4.31–10.16)

## 2022-06-30 PROCEDURE — 87077 CULTURE AEROBIC IDENTIFY: CPT

## 2022-06-30 PROCEDURE — 87186 SC STD MICRODIL/AGAR DIL: CPT

## 2022-06-30 PROCEDURE — 85025 COMPLETE CBC W/AUTO DIFF WBC: CPT

## 2022-06-30 PROCEDURE — 85730 THROMBOPLASTIN TIME PARTIAL: CPT

## 2022-06-30 PROCEDURE — 36415 COLL VENOUS BLD VENIPUNCTURE: CPT

## 2022-06-30 PROCEDURE — 87086 URINE CULTURE/COLONY COUNT: CPT

## 2022-06-30 NOTE — TELEPHONE ENCOUNTER
Patient called she is concerned about her urinalysis with her pre-op and surgery coming up would you please review her urine and advise if she should be on an antibiotic or what she should do about this?

## 2022-06-30 NOTE — TELEPHONE ENCOUNTER
Added culture order- let pt know that this is added and see if she has symptoms- will await culture results and treat accordingly

## 2022-07-01 DIAGNOSIS — N39.0 URINARY TRACT INFECTION WITHOUT HEMATURIA, SITE UNSPECIFIED: Primary | ICD-10-CM

## 2022-07-01 RX ORDER — CEPHALEXIN 250 MG/1
250 CAPSULE ORAL 2 TIMES DAILY
Qty: 14 CAPSULE | Refills: 0 | Status: SHIPPED | OUTPATIENT
Start: 2022-07-01 | End: 2022-07-08

## 2022-07-02 DIAGNOSIS — N39.0 URINARY TRACT INFECTION WITHOUT HEMATURIA, SITE UNSPECIFIED: Primary | ICD-10-CM

## 2022-07-02 LAB — BACTERIA UR CULT: ABNORMAL

## 2022-07-02 RX ORDER — DOXYCYCLINE HYCLATE 100 MG
100 TABLET ORAL 2 TIMES DAILY
Qty: 14 TABLET | Refills: 0 | Status: SHIPPED | OUTPATIENT
Start: 2022-07-02 | End: 2022-07-09

## 2022-07-12 ENCOUNTER — OFFICE VISIT (OUTPATIENT)
Dept: FAMILY MEDICINE CLINIC | Facility: CLINIC | Age: 72
End: 2022-07-12
Payer: MEDICARE

## 2022-07-12 VITALS
RESPIRATION RATE: 16 BRPM | TEMPERATURE: 98.6 F | DIASTOLIC BLOOD PRESSURE: 78 MMHG | WEIGHT: 165.8 LBS | SYSTOLIC BLOOD PRESSURE: 126 MMHG | HEIGHT: 63 IN | OXYGEN SATURATION: 98 % | BODY MASS INDEX: 29.38 KG/M2 | HEART RATE: 64 BPM

## 2022-07-12 DIAGNOSIS — Z79.4 TYPE 2 DIABETES MELLITUS WITH DIABETIC NEPHROPATHY, WITH LONG-TERM CURRENT USE OF INSULIN (HCC): ICD-10-CM

## 2022-07-12 DIAGNOSIS — Z23 ENCOUNTER FOR IMMUNIZATION: ICD-10-CM

## 2022-07-12 DIAGNOSIS — M79.671 RIGHT FOOT PAIN: ICD-10-CM

## 2022-07-12 DIAGNOSIS — E11.21 TYPE 2 DIABETES MELLITUS WITH DIABETIC NEPHROPATHY, WITH LONG-TERM CURRENT USE OF INSULIN (HCC): ICD-10-CM

## 2022-07-12 DIAGNOSIS — N30.00 ACUTE CYSTITIS WITHOUT HEMATURIA: ICD-10-CM

## 2022-07-12 DIAGNOSIS — N18.4 STAGE 4 CHRONIC KIDNEY DISEASE (HCC): ICD-10-CM

## 2022-07-12 DIAGNOSIS — D63.1 ANEMIA IN STAGE 4 CHRONIC KIDNEY DISEASE (HCC): ICD-10-CM

## 2022-07-12 DIAGNOSIS — Z12.31 ENCOUNTER FOR SCREENING MAMMOGRAM FOR BREAST CANCER: ICD-10-CM

## 2022-07-12 DIAGNOSIS — N18.4 ANEMIA IN STAGE 4 CHRONIC KIDNEY DISEASE (HCC): ICD-10-CM

## 2022-07-12 DIAGNOSIS — Z01.818 PRE-OPERATIVE EXAMINATION: Primary | ICD-10-CM

## 2022-07-12 PROCEDURE — 90677 PCV20 VACCINE IM: CPT

## 2022-07-12 PROCEDURE — G0009 ADMIN PNEUMOCOCCAL VACCINE: HCPCS

## 2022-07-12 PROCEDURE — 99214 OFFICE O/P EST MOD 30 MIN: CPT | Performed by: FAMILY MEDICINE

## 2022-07-12 NOTE — H&P (VIEW-ONLY)
Presurgical Evaluation    Subjective:      Patient ID: Dafne Smith is a 67 y o  female  Chief Complaint   Patient presents with    Pre-op Exam       HPI    The following portions of the patient's history were reviewed and updated as appropriate: allergies, current medications, past family history, past medical history, past social history, past surgical history and problem list     Procedure date: 07/19/2022    Surgeon:  Dr Jarred Lopes  Planned procedure:  R 1st MTP fusion  Diagnosis for procedure:  pain    Prior anesthesia:  Has tolerated well  Has been on a vent in the past from aspiration pneumonia x 2  CAD History:  negative nuclear stress test 9/20/21    Pulmonary History: BARBARA (Sleep Apnea)- has Inspire device with improved AHI  Follows w Dr Maddie Lombardi    Renal history: CKD stage 4 - GFR 15-29  Follows w Dr Gauri Schmidt, nephrology  She should have a BMP the day after surgery to monitor for ANDRA  Diabetes History:  Type 2  Controlled- follows with endocrinology at [facility]  She will contact endocrine for instructions regarding insulin dose changes preoperatively  She has an appointment on Thursday    Current insulin regimen:  50 units levemir at night, novolog 14/14/18 with meals  No hypoglycemic events     Neurological History: None     On Immunosuppressant meds/biologics: No      Review of Systems   Constitutional: Negative for chills and fever  HENT: Positive for postnasal drip (chronic)  Negative for rhinorrhea and sore throat  Respiratory: Negative for chest tightness and shortness of breath  Gastrointestinal: Negative for blood in stool, constipation and diarrhea  Genitourinary: Negative  Neurological: Negative for dizziness and light-headedness  Hematological: Does not bruise/bleed easily           Current Outpatient Medications   Medication Sig Dispense Refill    acetaminophen (TYLENOL) 325 mg tablet Take 2 tablets (650 mg total) by mouth every 6 (six) hours as needed for mild pain, headaches or fever  0    albuterol (Ventolin HFA) 90 mcg/act inhaler Inhale 2 puffs every 6 (six) hours as needed for wheezing or shortness of breath  0    amLODIPine (NORVASC) 5 mg tablet TAKE 1 TABLET (5 MG TOTAL) BY MOUTH DAILY  90 tablet 0    B-D ULTRAFINE III SHORT PEN 31G X 8 MM MISC USE AS DIRECTED 4 TIMES PER DAY  3    DULoxetine HCl (CYMBALTA PO) Take 90 mg by mouth 2 (two) times a day      famotidine (PEPCID) 10 mg tablet Take 10 mg by mouth in the morning and 10 mg in the evening   fluticasone (FLONASE) 50 mcg/act nasal spray 1-2 sprays into each nostril daily 18 2 mL 5    gabapentin (NEURONTIN) 100 mg capsule Take 100 mg by mouth daily      insulin aspart (NovoLOG) 100 units/mL injection Inject under the skin 3 (three) times a day before meals 14 units, 14 units, 18 units   insulin detemir (Levemir FlexTouch) 100 Units/mL injection pen Inject 50 Units under the skin every evening       Krill Oil (Omega-3) 500 MG CAPS Take 1,000 mg by mouth 2 (two) times a day      levothyroxine 112 mcg tablet Take 112 mcg by mouth every morning      metoprolol tartrate (LOPRESSOR) 50 mg tablet TAKE 1 TABLET (50 MG TOTAL) BY MOUTH EVERY 12 (TWELVE) HOURS 180 tablet 3    olmesartan (BENICAR) 20 mg tablet Take 0 5 tablets (10 mg total) by mouth 2 (two) times a day 90 tablet 3    pantoprazole (PROTONIX) 40 mg tablet Take 1 tablet (40 mg total) by mouth in the morning  90 tablet 0    pramipexole (MIRAPEX) 0 25 mg tablet Take 0 25 mg by mouth in the morning   rosuvastatin (CRESTOR) 20 MG tablet Take 1 tablet (20 mg total) by mouth daily 30 tablet 0    torsemide (DEMADEX) 20 mg tablet TAKE 1 TABLET BY MOUTH EVERY DAY 90 tablet 3    Vitamin D, Ergocalciferol, 2000 units CAPS Take 2,000 Units by mouth daily        No current facility-administered medications for this visit         Allergies on file:   Sulfamethoxazole-trimethoprim and Ciprofloxacin    Patient Active Problem List   Diagnosis    Stage 4 chronic kidney disease (HCC)    Hypertensive chronic kidney disease with stage 1 through stage 4 chronic kidney disease, or unspecified chronic kidney disease    BARBARA (obstructive sleep apnea)    RLS (restless legs syndrome)    Persistent proteinuria    Type 2 diabetes mellitus with diabetic nephropathy, with long-term current use of insulin (HCC)    Pulmonary hypertension (HCC)    Dyspnea    History of repair of hiatal hernia    [facility]RA [facility]    Dyslipidemia    Vitamin D deficiency    Anemia of chronic renal failure, stage 3 (moderate) (HCC)    Fibromyalgia    Type 2 diabetes mellitus with diabetic chronic kidney disease (HCC)    GERD (gastroesophageal reflux disease)    History of kidney stones    Urinary urgency    Ambulatory dysfunction    Memory changes    Gastroparesis diabeticorum (HCC)    B12 neuropathy (HCC)    Depression, recurrent (HCC)    Chronic respiratory failure with hypoxia (HCC)    Anemia in stage 4 chronic kidney disease ([address] )    Epigastric abdominal pain with severe diabetic gastroparesis, status post EGD/Botox injection, 5/14    COPD (chronic obstructive pulmonary disease) (HCC)    BMI 29 0-29 9,adult    Chronic constipation    History of colon polyps    COVID-19 virus infection    Other chest pain    Diabetic nephropathy associated with type 2 diabetes mellitus (HCC)    Abdominal pain    Abnormal thyroid blood test    Hypotension    Urinary retention    UTI (urinary tract infection)        Past Medical History:   Diagnosis Date    Allergic     Anesthesia     pt reports "had to use double lumen endo tube for hiatal hernia repair/so surgeon could get to where he needed to work"    Arthritis     Aspiration into airway     Basal cell carcinoma 2007    left cheek     BCC (basal cell carcinoma) 05/27/2021    Left Nasal tip    Cancer ([address] )     squamous cell cancer on forhead    Cancer ([facility] )     basal cell on nose     Chronic kidney disease 2000, 2018    Stones, kidney disease stage 4    Chronic pain disorder     bilat feet and joint pain on occas    Colon polyp     COVID-19 08/2021    recovered at home/did receive monoclonal infusion    Dental bridge present     Depression     Diabetes mellitus [facility]     Type 2    Diabetic neuropathy ([facility]     Disease of thyroid gland     Family history of thyroid problem     Fatty liver     GERD (gastroesophageal reflux disease)     Heart burn     Hiatal hernia     History of pneumonia     Hyperlipidemia     Hyperplasia, parathyroid ([facility]     Hypertension     Kidney problem     Kidney stone     Memory loss Julu2 2020    Motion sickness     Obesity 1978    Obesity (BMI 30 0-34  9)     Pollen allergies     RLS (restless legs syndrome)     SCC (squamous cell carcinoma) 05/04/2021    left mid forehead    Seasonal allergies     Sleep apnea     uses CPAP    Squamous cell skin cancer 2007    left cheek     Swollen ankles     Urinary tract infection 03/28/2022    Wears glasses        Past Surgical History:   Procedure Laterality Date    ARTHROSCOPY KNEE      BREAST BIOPSY      stereotactic-benign    BREAST BIOPSY      stereo-benign    BREAST EXCISIONAL BIOPSY      unknown date-benign    BREAST EXCISIONAL BIOPSY      unknown date-benign    BREAST EXCISIONAL BIOPSY      unknown date-benign    BREAST EXCISIONAL BIOPSY      unknown age-benign    CHOLECYSTECTOMY      COLONOSCOPY      EXAMINATION UNDER ANESTHESIA N/A 6/24/2021    Procedure: EXAM UNDER ANESTHESIA (EUA), DISE;  Surgeon: Joann Stephens MD;  [facility];  Service: ENT    HERNIA REPAIR      HIATAL HERNIA REPAIR      KNEE SURGERY      Emy webb lap surg    LIPOSUCTION      LYMPH NODE BIOPSY      MOHS SURGERY  05/20/2021    left mid forehead-Gautam    MOHS SURGERY Left 05/27/2021    Left nasal tip- gautam     [place] INCISION IMPLANT CRANIAL NERVE STIM ELECTRODE/PULSE GEN N/A 11/10/2021    Procedure: INSERTION UPPER AIRWAY STIMULATOR, INSPIRE IMPLANT;  Surgeon: Kristal Horton MD;  [facility];  Service: ENT    REDUCTION MAMMAPLASTY Bilateral 2000    REDUCTION MAMMAPLASTY      SKIN BIOPSY      SKIN CANCER EXCISION  2007    squamous cell carcinoma     SKIN CANCER EXCISION  2007    basal cell carcinoma    SQUAMOUS CELL CARCINOMA EXCISION      TOE SURGERY      TONSILLECTOMY      TUBAL LIGATION      UPPER GASTROINTESTINAL ENDOSCOPY         Family History   Problem Relation Age of Onset    Heart disease Mother     Depression Mother     Hypertension Mother     COPD Mother     Hearing loss Mother     Anxiety disorder Mother     Heart disease Father     Lung cancer Father 79        Smoker     Cancer Father         brain    Alcohol abuse Father     Dementia Father     Hypertension Father     Thyroid disease Father     COPD Father     Arthritis Father     Brain cancer Father 76    Hypertension Sister     Diabetes Sister     Heart disease Sister     Thyroid disease Sister     Cancer Sister         Lympoma    Lung cancer Sister 78    Anxiety disorder Sister     Hypertension Brother     Diabetes Brother     Cancer Brother         Throat    Dementia Brother     Stroke Brother     Hypertension Brother     Heart disease Brother     Diabetes Brother     No Known Problems Son     No Known Problems Son     Brain cancer Paternal [de-identified]         unknown age       Social History     Tobacco Use    Smoking status: Former Smoker     Packs/day: 2 00     Years: 10 00     Pack years: 20 00     Types: Cigarettes     Start date: 1/10/1968     Quit date:      Years since quittin 5    Smokeless tobacco: Former User     Quit date: 1976    Tobacco comment: Smoked 2 pack a day   Vaping Use    Vaping Use: Never used   Substance Use Topics    Alcohol use: Yes     Comment: Social    Drug use: No       Objective:    Vitals:    22 0839   BP: 126/78   Pulse: 64   Resp: 16 Temp: 98 6 °F (37 °C)   SpO2: 98%   Weight: 75 2 kg (165 lb 12 8 oz)   Height: 5' 3" (1 6 m)        Physical Exam  Vitals and nursing note reviewed  Constitutional:       Appearance: Normal appearance  She is not ill-appearing  HENT:      Head: Normocephalic and atraumatic  Eyes:      Conjunctiva/sclera: Conjunctivae normal    Cardiovascular:      Rate and Rhythm: Normal rate and regular rhythm  Heart sounds: No murmur heard  Pulmonary:      Effort: Pulmonary effort is normal       Breath sounds: Normal breath sounds  No wheezing or rhonchi  Abdominal:      Palpations: Abdomen is soft  Tenderness: There is no abdominal tenderness  There is no guarding or rebound  Musculoskeletal:      Right lower leg: No edema  Left lower leg: No edema  Lymphadenopathy:      Cervical: No cervical adenopathy  Neurological:      Mental Status: She is alert and oriented to person, place, and time  Psychiatric:         Mood and Affect: Mood normal          Behavior: Behavior normal            Preop labs/testing available and reviewed: yes  Treated for UTI       WBC   Date Value Ref Range Status   2022 8 61 4 31 - 10 16 Thousand/uL Final          EKG 22- NSR, first degree av block, no st/t changes  Stress test/cath normal nuclear stress test 2021    Functional capacity: Walking , 4-5 MPH               4 Mets   Pick the highest level patient can comfortably perform   4 mets or greater for surgery    RCRI  High Risk surgery? 0 Point  CAD History:         0 Point   MI; Positive Stress Test; CP due to Mi;  Nitrate Usage to control Angina;  Pathologic Q wave on EKG  CHF Active:         0 Point   Pulm Edema; Paroxysmal Nocturnal Dyspnea;  Bibasilar Rales (crackles);S3; CHF on CXR  Cerebrovascular Disease (TIA or CVA):     0 Point  DM on Insulin:        1 Point  Serum Creat >2 0 mg/dl:       0 Point          Total Points: 1     Scorin: Class I, Very Low Risk (0 4%)     1: Class II, Low risk (0 9%)     2: Class III Moderate (6 6%)     3: Class IV High (>11%)      ANDRA Risk:  GFR:   35 on 7/7/22     If GFR<45, Consider PRE and POST op Nephrology Consult  Check BMP day after surgery  Order placed with cc to Dr Edvin Reeder            Assessment/Plan:    Patient is medically optimized (cleared) for the planned procedure  Further testing/evaluation is not required  Postop concerns: - BMP ordered for day after surgery to monitor renal function  Has BARBARA- has inspire device (though she is apparently not using this currently)      Other Visit Diagnoses     Pre-operative examination    -  Primary- as above  Right foot pain         Problem List Items Addressed This Visit        Endocrine    Type 2 diabetes mellitus with diabetic nephropathy, with long-term current use of insulin ([address] )     Pt will reach out to her endocrinologist in regards to insulin dosing perioperatively  Lab Results   Component Value Date    HGBA1C 6 8 (H) 07/07/2022                 Genitourinary    Stage 4 chronic kidney disease (HCC)     Lab Results   Component Value Date    EGFR 30 05/27/2022    EGFR 30 05/19/2022    EGFR 31 04/29/2022    CREATININE 1 64 (H) 05/27/2022    CREATININE 1 66 (H) 05/19/2022    CREATININE 1 62 (H) 04/29/2022   Lab slip given to check BMP the day after surgery  Relevant Orders    Basic metabolic panel    Anemia in stage 4 chronic kidney disease (HCC)     Hemoglobin stable at 12 9           UTI (urinary tract infection)     Has completed treatment for enterobacter uti 6/30/22                 Encounter for immunization        Relevant Orders    Pneumococcal Conjugate Vaccine 20-valent (PCV20) (Completed)    Encounter for screening mammogram for breast cancer        Relevant Orders    Mammo screening bilateral w 3d & cad        She will reach out to endocrine in regards to insulin dosing preoperatively/perioperatively  She is aware to avoid fish oil/krill oil for 5 days prior to surgery  She does not use NSAIDs due to renal function

## 2022-07-12 NOTE — PROGRESS NOTES
Presurgical Evaluation    Subjective:      Patient ID: Jonah Chaney is a 67 y o  female  Chief Complaint   Patient presents with    Pre-op Exam       HPI    The following portions of the patient's history were reviewed and updated as appropriate: allergies, current medications, past family history, past medical history, past social history, past surgical history and problem list     Procedure date: 07/19/2022    Surgeon:  Dr Fahad Arteaga  Planned procedure:  R 1st MTP fusion  Diagnosis for procedure:  pain    Prior anesthesia:  Has tolerated well  Has been on a vent in the past from aspiration pneumonia x 2  CAD History:  negative nuclear stress test 9/20/21    Pulmonary History: BARBARA (Sleep Apnea)- has Inspire device with improved AHI  Follows w Dr Mar Lake    Renal history: CKD stage 4 - GFR 15-29  Follows w Dr Golden Bangura, nephrology  She should have a BMP the day after surgery to monitor for ANDRA  Diabetes History:  Type 2  Controlled- follows with endocrinology at [facility]  She will contact endocrine for instructions regarding insulin dose changes preoperatively  She has an appointment on Thursday    Current insulin regimen:  50 units levemir at night, novolog 14/14/18 with meals  No hypoglycemic events     Neurological History: None     On Immunosuppressant meds/biologics: No      Review of Systems   Constitutional: Negative for chills and fever  HENT: Positive for postnasal drip (chronic)  Negative for rhinorrhea and sore throat  Respiratory: Negative for chest tightness and shortness of breath  Gastrointestinal: Negative for blood in stool, constipation and diarrhea  Genitourinary: Negative  Neurological: Negative for dizziness and light-headedness  Hematological: Does not bruise/bleed easily           Current Outpatient Medications   Medication Sig Dispense Refill    acetaminophen (TYLENOL) 325 mg tablet Take 2 tablets (650 mg total) by mouth every 6 (six) hours as needed for mild pain, headaches or fever  0    albuterol (Ventolin HFA) 90 mcg/act inhaler Inhale 2 puffs every 6 (six) hours as needed for wheezing or shortness of breath  0    amLODIPine (NORVASC) 5 mg tablet TAKE 1 TABLET (5 MG TOTAL) BY MOUTH DAILY  90 tablet 0    B-D ULTRAFINE III SHORT PEN 31G X 8 MM MISC USE AS DIRECTED 4 TIMES PER DAY  3    DULoxetine HCl (CYMBALTA PO) Take 90 mg by mouth 2 (two) times a day      famotidine (PEPCID) 10 mg tablet Take 10 mg by mouth in the morning and 10 mg in the evening   fluticasone (FLONASE) 50 mcg/act nasal spray 1-2 sprays into each nostril daily 18 2 mL 5    gabapentin (NEURONTIN) 100 mg capsule Take 100 mg by mouth daily      insulin aspart (NovoLOG) 100 units/mL injection Inject under the skin 3 (three) times a day before meals 14 units, 14 units, 18 units   insulin detemir (Levemir FlexTouch) 100 Units/mL injection pen Inject 50 Units under the skin every evening       Krill Oil (Omega-3) 500 MG CAPS Take 1,000 mg by mouth 2 (two) times a day      levothyroxine 112 mcg tablet Take 112 mcg by mouth every morning      metoprolol tartrate (LOPRESSOR) 50 mg tablet TAKE 1 TABLET (50 MG TOTAL) BY MOUTH EVERY 12 (TWELVE) HOURS 180 tablet 3    olmesartan (BENICAR) 20 mg tablet Take 0 5 tablets (10 mg total) by mouth 2 (two) times a day 90 tablet 3    pantoprazole (PROTONIX) 40 mg tablet Take 1 tablet (40 mg total) by mouth in the morning  90 tablet 0    pramipexole (MIRAPEX) 0 25 mg tablet Take 0 25 mg by mouth in the morning   rosuvastatin (CRESTOR) 20 MG tablet Take 1 tablet (20 mg total) by mouth daily 30 tablet 0    torsemide (DEMADEX) 20 mg tablet TAKE 1 TABLET BY MOUTH EVERY DAY 90 tablet 3    Vitamin D, Ergocalciferol, 2000 units CAPS Take 2,000 Units by mouth daily        No current facility-administered medications for this visit         Allergies on file:   Sulfamethoxazole-trimethoprim and Ciprofloxacin    Patient Active Problem List   Diagnosis    Stage 4 chronic kidney disease (HCC)    Hypertensive chronic kidney disease with stage 1 through stage 4 chronic kidney disease, or unspecified chronic kidney disease    BARBARA (obstructive sleep apnea)    RLS (restless legs syndrome)    Persistent proteinuria    Type 2 diabetes mellitus with diabetic nephropathy, with long-term current use of insulin (HCC)    Pulmonary hypertension (HCC)    Dyspnea    History of repair of hiatal hernia    ANDRA (acute kidney injury) ([address] )    Dyslipidemia    Vitamin D deficiency    Anemia of chronic renal failure, stage 3 (moderate) (HCC)    Fibromyalgia    Type 2 diabetes mellitus with diabetic chronic kidney disease (HCC)    GERD (gastroesophageal reflux disease)    History of kidney stones    Urinary urgency    Ambulatory dysfunction    Memory changes    Gastroparesis diabeticorum (HCC)    B12 neuropathy (HCC)    Depression, recurrent (HCC)    Chronic respiratory failure with hypoxia (HCC)    Anemia in stage 4 chronic kidney disease ([address] )    Epigastric abdominal pain with severe diabetic gastroparesis, status post EGD/Botox injection, 5/14    COPD (chronic obstructive pulmonary disease) (HCC)    BMI 29 0-29 9,adult    Chronic constipation    History of colon polyps    COVID-19 virus infection    Other chest pain    Diabetic nephropathy associated with type 2 diabetes mellitus (HCC)    Abdominal pain    Abnormal thyroid blood test    Hypotension    Urinary retention    UTI (urinary tract infection)        Past Medical History:   Diagnosis Date    Allergic     Anesthesia     pt reports "had to use double lumen endo tube for hiatal hernia repair/so surgeon could get to where he needed to work"    Arthritis     Aspiration into airway     Basal cell carcinoma 2007    left cheek     BCC (basal cell carcinoma) 05/27/2021    Left Nasal tip    Cancer ([address] )     squamous cell cancer on forhead    Cancer ([address] )     basal cell on nose     Chronic kidney disease 2000, 2018    Stones, kidney disease stage 4    Chronic pain disorder     bilat feet and joint pain on occas    Colon polyp     COVID-19 08/2021    recovered at home/did receive monoclonal infusion    Dental bridge present     Depression     Diabetes mellitus [facility]     Type 2    Diabetic neuropathy ([facility]     Disease of thyroid gland     Family history of thyroid problem     Fatty liver     GERD (gastroesophageal reflux disease)     Heart burn     Hiatal hernia     History of pneumonia     Hyperlipidemia     Hyperplasia, parathyroid ([facility]     Hypertension     Kidney problem     Kidney stone     Memory loss Julu2 2020    Motion sickness     Obesity 1978    Obesity (BMI 30 0-34  9)     Pollen allergies     RLS (restless legs syndrome)     SCC (squamous cell carcinoma) 05/04/2021    left mid forehead    Seasonal allergies     Sleep apnea     uses CPAP    Squamous cell skin cancer 2007    left cheek     Swollen ankles     Urinary tract infection 03/28/2022    Wears glasses        Past Surgical History:   Procedure Laterality Date    ARTHROSCOPY KNEE      BREAST BIOPSY      stereotactic-benign    BREAST BIOPSY      stereo-benign    BREAST EXCISIONAL BIOPSY      unknown date-benign    BREAST EXCISIONAL BIOPSY      unknown date-benign    BREAST EXCISIONAL BIOPSY      unknown date-benign    BREAST EXCISIONAL BIOPSY      unknown age-benign    CHOLECYSTECTOMY      COLONOSCOPY      EXAMINATION UNDER ANESTHESIA N/A 6/24/2021    Procedure: EXAM UNDER ANESTHESIA (EUA), DISE;  Surgeon: Junaid Coppola MD;  Location: [facility] MAIN OR;  Service: ENT    HERNIA REPAIR      HIATAL HERNIA REPAIR      KNEE SURGERY      Emy webb lap surg    LIPOSUCTION      LYMPH NODE BIOPSY      MOHS SURGERY  05/20/2021    left mid forehead-Gautam    MOHS SURGERY Left 05/27/2021    Left nasal tip- gautam     CT INCISION IMPLANT CRANIAL NERVE STIM ELECTRODE/PULSE GEN N/A 11/10/2021    Procedure: INSERTION UPPER AIRWAY STIMULATOR, INSPIRE IMPLANT;  Surgeon: Ayla Lazaro MD;  Location: [facility] Main [facility];  Service: ENT    REDUCTION MAMMAPLASTY Bilateral 2000    REDUCTION MAMMAPLASTY      SKIN BIOPSY      SKIN CANCER EXCISION  2007    squamous cell carcinoma     SKIN CANCER EXCISION  2007    basal cell carcinoma    SQUAMOUS CELL CARCINOMA EXCISION      TOE SURGERY      TONSILLECTOMY      TUBAL LIGATION      UPPER GASTROINTESTINAL ENDOSCOPY         Family History   Problem Relation Age of Onset    Heart disease Mother     Depression Mother     Hypertension Mother     COPD Mother     Hearing loss Mother     Anxiety disorder Mother     Heart disease Father     Lung cancer Father 79        Smoker     Cancer Father         brain    Alcohol abuse Father     Dementia Father     Hypertension Father     Thyroid disease Father     COPD Father     Arthritis Father     Brain cancer Father 76    Hypertension Sister     Diabetes Sister     Heart disease Sister     Thyroid disease Sister     Cancer Sister         Lympoma    Lung cancer Sister 78    Anxiety disorder Sister     Hypertension Brother     Diabetes Brother     Cancer Brother         Throat    Dementia Brother     Stroke Brother     Hypertension Brother     Heart disease Brother     Diabetes Brother     No Known Problems Son     No Known Problems Son     Brain cancer Paternal [de-identified]         unknown age       Social History     Tobacco Use    Smoking status: Former Smoker     Packs/day: 2 00     Years: 10 00     Pack years: 20 00     Types: Cigarettes     Start date: 1/10/1968     Quit date:      Years since quittin 5    Smokeless tobacco: Former User     Quit date: 1976    Tobacco comment: Smoked 2 pack a day   Vaping Use    Vaping Use: Never used   Substance Use Topics    Alcohol use: Yes     Comment: Social    Drug use: No       Objective:    Vitals:    22 0839   BP: 126/78   Pulse: 64   Resp: 16 Temp: 98 6 °F (37 °C)   SpO2: 98%   Weight: 75 2 kg (165 lb 12 8 oz)   Height: 5' 3" (1 6 m)        Physical Exam  Vitals and nursing note reviewed  Constitutional:       Appearance: Normal appearance  She is not ill-appearing  HENT:      Head: Normocephalic and atraumatic  Eyes:      Conjunctiva/sclera: Conjunctivae normal    Cardiovascular:      Rate and Rhythm: Normal rate and regular rhythm  Heart sounds: No murmur heard  Pulmonary:      Effort: Pulmonary effort is normal       Breath sounds: Normal breath sounds  No wheezing or rhonchi  Abdominal:      Palpations: Abdomen is soft  Tenderness: There is no abdominal tenderness  There is no guarding or rebound  Musculoskeletal:      Right lower leg: No edema  Left lower leg: No edema  Lymphadenopathy:      Cervical: No cervical adenopathy  Neurological:      Mental Status: She is alert and oriented to person, place, and time  Psychiatric:         Mood and Affect: Mood normal          Behavior: Behavior normal            Preop labs/testing available and reviewed: yes  Treated for UTI       WBC   Date Value Ref Range Status   2022 8 61 4 31 - 10 16 Thousand/uL Final          EKG 22- NSR, first degree av block, no st/t changes  Stress test/cath normal nuclear stress test 2021    Functional capacity: Walking , 4-5 MPH               4 Mets   Pick the highest level patient can comfortably perform   4 mets or greater for surgery    RCRI  High Risk surgery? 0 Point  CAD History:         0 Point   MI; Positive Stress Test; CP due to Mi;  Nitrate Usage to control Angina;  Pathologic Q wave on EKG  CHF Active:         0 Point   Pulm Edema; Paroxysmal Nocturnal Dyspnea;  Bibasilar Rales (crackles);S3; CHF on CXR  Cerebrovascular Disease (TIA or CVA):     0 Point  DM on Insulin:        1 Point  Serum Creat >2 0 mg/dl:       0 Point          Total Points: 1     Scorin: Class I, Very Low Risk (0 4%)     1: Class II, Low risk (0 9%)     2: Class III Moderate (6 6%)     3: Class IV High (>11%)      ANDRA Risk:  GFR:   35 on 7/7/22     If GFR<45, Consider PRE and POST op Nephrology Consult  Check BMP day after surgery  Order placed with cc to Dr Paulnia Negron            Assessment/Plan:    Patient is medically optimized (cleared) for the planned procedure  Further testing/evaluation is not required  Postop concerns: - BMP ordered for day after surgery to monitor renal function  Has BARBARA- has inspire device (though she is apparently not using this currently)      Other Visit Diagnoses     Pre-operative examination    -  Primary- as above  Right foot pain         Problem List Items Addressed This Visit        Endocrine    Type 2 diabetes mellitus with diabetic nephropathy, with long-term current use of insulin ([address] )     Pt will reach out to her endocrinologist in regards to insulin dosing perioperatively  Lab Results   Component Value Date    HGBA1C 6 8 (H) 07/07/2022                 Genitourinary    Stage 4 chronic kidney disease (HCC)     Lab Results   Component Value Date    EGFR 30 05/27/2022    EGFR 30 05/19/2022    EGFR 31 04/29/2022    CREATININE 1 64 (H) 05/27/2022    CREATININE 1 66 (H) 05/19/2022    CREATININE 1 62 (H) 04/29/2022   Lab slip given to check BMP the day after surgery  Relevant Orders    Basic metabolic panel    Anemia in stage 4 chronic kidney disease (HCC)     Hemoglobin stable at 12 9           UTI (urinary tract infection)     Has completed treatment for enterobacter uti 6/30/22                 Encounter for immunization        Relevant Orders    Pneumococcal Conjugate Vaccine 20-valent (PCV20) (Completed)    Encounter for screening mammogram for breast cancer        Relevant Orders    Mammo screening bilateral w 3d & cad        She will reach out to endocrine in regards to insulin dosing preoperatively/perioperatively  She is aware to avoid fish oil/krill oil for 5 days prior to surgery  She does not use NSAIDs due to renal function

## 2022-07-12 NOTE — PROGRESS NOTES
Patient's shoes and socks removed  Right Foot/Ankle   Right Foot Inspection  Skin Exam: skin normal and skin intact  No dry skin, no warmth, no callus, no erythema, no maceration, no abnormal color, no pre-ulcer, no ulcer and no callus  Toe Exam: ROM and strength within normal limits  Sensory   Monofilament testing: intact    Vascular  Capillary refills: < 3 seconds  The right PT pulse is 2+  Left Foot/Ankle  Left Foot Inspection  Skin Exam: skin normal and skin intact  No dry skin, no warmth, no erythema, no maceration, normal color, no pre-ulcer, no ulcer and no callus  Toe Exam: ROM and strength within normal limits  Vascular  Capillary refills: < 3 seconds  The left PT pulse is 2+       Assign Risk Category  No deformity present  No loss of protective sensation  No weak pulses  Risk: 0

## 2022-07-12 NOTE — ASSESSMENT & PLAN NOTE
Has Inspire device placed with improvement of AHI but notes she can't tolerate the higher setting so isn't using it right now

## 2022-07-13 ENCOUNTER — HOSPITAL ENCOUNTER (OUTPATIENT)
Dept: MAMMOGRAPHY | Facility: HOSPITAL | Age: 72
Discharge: HOME | End: 2022-07-13
Attending: FAMILY MEDICINE
Payer: MEDICARE

## 2022-07-13 VITALS — HEIGHT: 63 IN | WEIGHT: 165.79 LBS | BODY MASS INDEX: 29.38 KG/M2

## 2022-07-13 DIAGNOSIS — Z12.31 ENCOUNTER FOR SCREENING MAMMOGRAM FOR BREAST CANCER: ICD-10-CM

## 2022-07-13 PROCEDURE — 77067 SCR MAMMO BI INCL CAD: CPT

## 2022-07-13 PROCEDURE — 77063 BREAST TOMOSYNTHESIS BI: CPT

## 2022-07-14 NOTE — ASSESSMENT & PLAN NOTE
Lab Results   Component Value Date    EGFR 30 05/27/2022    EGFR 30 05/19/2022    EGFR 31 04/29/2022    CREATININE 1 64 (H) 05/27/2022    CREATININE 1 66 (H) 05/19/2022    CREATININE 1 62 (H) 04/29/2022   Lab slip given to check BMP the day after surgery

## 2022-07-14 NOTE — ASSESSMENT & PLAN NOTE
Pt will reach out to her endocrinologist in regards to insulin dosing perioperatively    Lab Results   Component Value Date    HGBA1C 6 8 (H) 07/07/2022

## 2022-07-15 ENCOUNTER — TELEPHONE (OUTPATIENT)
Dept: FAMILY MEDICINE CLINIC | Facility: CLINIC | Age: 72
End: 2022-07-15

## 2022-07-15 NOTE — TELEPHONE ENCOUNTER
Leticia Sayres from [facility] called, patient had an abnormal mammo and so a diagnostic mammo and u/s were ordered and are being done on Monday at 8am, would you please sign off on the orders so this can get done  Please advise

## 2022-07-18 ENCOUNTER — HOSPITAL ENCOUNTER (OUTPATIENT)
Dept: MAMMOGRAPHY | Facility: CLINIC | Age: 72
Discharge: HOME | End: 2022-07-18
Payer: MEDICARE

## 2022-07-18 ENCOUNTER — HOSPITAL ENCOUNTER (OUTPATIENT)
Dept: ULTRASOUND IMAGING | Facility: CLINIC | Age: 72
Discharge: HOME | End: 2022-07-18
Payer: MEDICARE

## 2022-07-18 ENCOUNTER — ANESTHESIA EVENT (OUTPATIENT)
Dept: PERIOP | Facility: HOSPITAL | Age: 72
End: 2022-07-18
Payer: MEDICARE

## 2022-07-18 VITALS — WEIGHT: 165 LBS | HEIGHT: 63 IN | BODY MASS INDEX: 29.23 KG/M2

## 2022-07-18 VITALS — DIASTOLIC BLOOD PRESSURE: 90 MMHG | HEART RATE: 64 BPM | SYSTOLIC BLOOD PRESSURE: 148 MMHG

## 2022-07-18 DIAGNOSIS — R92.8 ABNORMAL MAMMOGRAM: ICD-10-CM

## 2022-07-18 PROCEDURE — 88341 IMHCHEM/IMCYTCHM EA ADD ANTB: CPT | Performed by: PATHOLOGY

## 2022-07-18 PROCEDURE — 19083 BX BREAST 1ST LESION US IMAG: CPT

## 2022-07-18 PROCEDURE — 77065 DX MAMMO INCL CAD UNI: CPT

## 2022-07-18 PROCEDURE — A4648 IMPLANTABLE TISSUE MARKER: HCPCS

## 2022-07-18 PROCEDURE — G0279 TOMOSYNTHESIS, MAMMO: HCPCS

## 2022-07-18 PROCEDURE — 88305 TISSUE EXAM BY PATHOLOGIST: CPT | Performed by: PATHOLOGY

## 2022-07-18 PROCEDURE — 88342 IMHCHEM/IMCYTCHM 1ST ANTB: CPT | Performed by: PATHOLOGY

## 2022-07-18 PROCEDURE — 76642 ULTRASOUND BREAST LIMITED: CPT

## 2022-07-18 RX ORDER — LIDOCAINE HYDROCHLORIDE 10 MG/ML
5 INJECTION, SOLUTION EPIDURAL; INFILTRATION; INTRACAUDAL; PERINEURAL ONCE
Status: COMPLETED | OUTPATIENT
Start: 2022-07-18 | End: 2022-07-18

## 2022-07-18 RX ADMIN — LIDOCAINE HYDROCHLORIDE 5 ML: 10 INJECTION, SOLUTION EPIDURAL; INFILTRATION; INTRACAUDAL; PERINEURAL at 09:27

## 2022-07-18 NOTE — PROGRESS NOTES
Ice pack given:    __X___yes _____no    Discharge instructions signed by patient:    __X___yes _____no    Discharge instructions given to patient:    __X___yes _____no    Discharged via:    __X___amulatory with     _____wheelchair    _____stretcher    Stable on discharge:    __X___yes ____no    Will call patient on Wed for pdc as she is having heel surgery tomorrow

## 2022-07-18 NOTE — PROGRESS NOTES
Patient arrived via:    __X___ambulatory with     _____wheelchair    _____stretcher      Domestic violence screen (unable to assess as  with patient)    ______negative______positive    Breast Implants:    _______yes ____X____no

## 2022-07-18 NOTE — PROGRESS NOTES
Met with patient and Dr Marta Bedoya regarding recommendation for;    __X___ RIGHT ______LEFT      __X___Ultrasound guided  ______Stereotactic breast biopsy  __X___Verbalized understanding        Blood thinners:  No: __X___ Yes: ______ What:                 We are going to do same day biopsy, pt escorted to changing area

## 2022-07-18 NOTE — PROGRESS NOTES
Procedure type:    ___X__ultrasound guided _____stereotactic    Breast:    _____Left ___X__Right    Location: right breast 1:00 5cm FN    Needle: 12 G Marquee with introducer    # of passes: 3    Clip: Ribbon    Performed by: Mayito Wiggins    Pressure held for 5 minutes by: Ursula Bah RN    Steri Strips:    ___X__yes _____no    Jm Rosenberg aid:    __X___yes_____no    Tolerated procedure:    __X___yes _____no

## 2022-07-18 NOTE — DISCHARGE INSTR - OTHER ORDERS
POST LARGE CORE BREAST BIOPSY PATIENT INFORMATION      Place an ice pack inside your bra over the top of the dressing every hour for 20 minutes (20 minutes on, 60 minutes off)  Do this until bedtime  Do not shower or bathe until the following morning  You may bathe your breast carefully with the steri-strips in place  Be careful    Not to loosen them  The steri-strips will fall off in 3-5 days  You may have mild discomfort, and you may have some bruising where the   Needle entered the skin  This should clear within 5-7 days  If you need medicine for discomfort, take acetaminophen products such as   Tylenol  You may also take Advil or Motrin products  Do not participate in strenuous activities such as-tennis, aerobics, skiing,  Weight lifting, etc  for 24 hours  Refrain from swimming/soaking for 72 hours  Wearing a bra for sleeping may be more comfortable for the first 24-48 hours  Watch for continued bleeding, pain or fever over 101  If any of these symptoms occur, please contact our    breast nurse navigator at the location where your biopsy was performed  During normal business hours (7:30 am-4:00 pm) please call the nurse navigator at the site where your   procedure was performed:    [address]: [phone] or   [address]: [phone] or [phone]  [address]: [address] [place]: [address]: [phone]              After 4 PM - please call your physician or go to the nearest Emergency Department location  9          The final results of your biopsy are usually available within one week

## 2022-07-19 ENCOUNTER — ANESTHESIA (OUTPATIENT)
Dept: PERIOP | Facility: HOSPITAL | Age: 72
End: 2022-07-19
Payer: MEDICARE

## 2022-07-19 ENCOUNTER — APPOINTMENT (OUTPATIENT)
Dept: RADIOLOGY | Facility: HOSPITAL | Age: 72
End: 2022-07-19
Payer: MEDICARE

## 2022-07-19 ENCOUNTER — HOSPITAL ENCOUNTER (OUTPATIENT)
Facility: HOSPITAL | Age: 72
Setting detail: OUTPATIENT SURGERY
Discharge: HOME | End: 2022-07-19
Attending: PODIATRIST | Admitting: PODIATRIST
Payer: MEDICARE

## 2022-07-19 VITALS
RESPIRATION RATE: 12 BRPM | DIASTOLIC BLOOD PRESSURE: 64 MMHG | TEMPERATURE: 98 F | HEART RATE: 61 BPM | BODY MASS INDEX: 29.61 KG/M2 | WEIGHT: 167.11 LBS | SYSTOLIC BLOOD PRESSURE: 139 MMHG | OXYGEN SATURATION: 97 % | HEIGHT: 63 IN

## 2022-07-19 DIAGNOSIS — Z98.890 POST-OPERATIVE STATE: Primary | ICD-10-CM

## 2022-07-19 LAB
GLUCOSE SERPL-MCNC: 116 MG/DL (ref 65–140)
GLUCOSE SERPL-MCNC: 129 MG/DL (ref 65–140)

## 2022-07-19 PROCEDURE — C1713 ANCHOR/SCREW BN/BN,TIS/BN: HCPCS | Performed by: PODIATRIST

## 2022-07-19 PROCEDURE — 82948 REAGENT STRIP/BLOOD GLUCOSE: CPT

## 2022-07-19 PROCEDURE — C1769 GUIDE WIRE: HCPCS | Performed by: PODIATRIST

## 2022-07-19 PROCEDURE — 73630 X-RAY EXAM OF FOOT: CPT

## 2022-07-19 DEVICE — DBX PUTTY, 1CC
Type: IMPLANTABLE DEVICE | Site: FOOT | Status: FUNCTIONAL
Brand: DBX®

## 2022-07-19 DEVICE — IMPLANTABLE DEVICE: Type: IMPLANTABLE DEVICE | Site: FOOT | Status: FUNCTIONAL

## 2022-07-19 RX ORDER — ONDANSETRON 2 MG/ML
INJECTION INTRAMUSCULAR; INTRAVENOUS AS NEEDED
Status: DISCONTINUED | OUTPATIENT
Start: 2022-07-19 | End: 2022-07-19

## 2022-07-19 RX ORDER — CEFAZOLIN SODIUM 1 G/3ML
INJECTION, POWDER, FOR SOLUTION INTRAMUSCULAR; INTRAVENOUS AS NEEDED
Status: DISCONTINUED | OUTPATIENT
Start: 2022-07-19 | End: 2022-07-19

## 2022-07-19 RX ORDER — OXYCODONE HYDROCHLORIDE AND ACETAMINOPHEN 5; 325 MG/1; MG/1
1 TABLET ORAL EVERY 4 HOURS PRN
Status: DISCONTINUED | OUTPATIENT
Start: 2022-07-19 | End: 2022-07-19 | Stop reason: HOSPADM

## 2022-07-19 RX ORDER — ONDANSETRON 2 MG/ML
4 INJECTION INTRAMUSCULAR; INTRAVENOUS ONCE AS NEEDED
Status: DISCONTINUED | OUTPATIENT
Start: 2022-07-19 | End: 2022-07-19 | Stop reason: HOSPADM

## 2022-07-19 RX ORDER — SODIUM CHLORIDE 9 MG/ML
125 INJECTION, SOLUTION INTRAVENOUS CONTINUOUS
Status: DISCONTINUED | OUTPATIENT
Start: 2022-07-19 | End: 2022-07-19 | Stop reason: HOSPADM

## 2022-07-19 RX ORDER — FENTANYL CITRATE/PF 50 MCG/ML
25 SYRINGE (ML) INJECTION
Status: DISCONTINUED | OUTPATIENT
Start: 2022-07-19 | End: 2022-07-19 | Stop reason: HOSPADM

## 2022-07-19 RX ORDER — PROPOFOL 10 MG/ML
INJECTION, EMULSION INTRAVENOUS AS NEEDED
Status: DISCONTINUED | OUTPATIENT
Start: 2022-07-19 | End: 2022-07-19

## 2022-07-19 RX ORDER — FENTANYL CITRATE 50 UG/ML
INJECTION, SOLUTION INTRAMUSCULAR; INTRAVENOUS AS NEEDED
Status: DISCONTINUED | OUTPATIENT
Start: 2022-07-19 | End: 2022-07-19

## 2022-07-19 RX ORDER — LIDOCAINE HYDROCHLORIDE 10 MG/ML
INJECTION, SOLUTION EPIDURAL; INFILTRATION; INTRACAUDAL; PERINEURAL AS NEEDED
Status: DISCONTINUED | OUTPATIENT
Start: 2022-07-19 | End: 2022-07-19

## 2022-07-19 RX ORDER — OXYCODONE HYDROCHLORIDE AND ACETAMINOPHEN 5; 325 MG/1; MG/1
1 TABLET ORAL EVERY 6 HOURS PRN
Qty: 20 TABLET | Refills: 0 | Status: SHIPPED | OUTPATIENT
Start: 2022-07-19 | End: 2022-07-29

## 2022-07-19 RX ORDER — HYDROMORPHONE HCL/PF 1 MG/ML
0.5 SYRINGE (ML) INJECTION
Status: DISCONTINUED | OUTPATIENT
Start: 2022-07-19 | End: 2022-07-19 | Stop reason: HOSPADM

## 2022-07-19 RX ADMIN — ONDANSETRON 4 MG: 2 INJECTION INTRAMUSCULAR; INTRAVENOUS at 09:17

## 2022-07-19 RX ADMIN — LIDOCAINE HYDROCHLORIDE 60 MG: 10 INJECTION, SOLUTION EPIDURAL; INFILTRATION; INTRACAUDAL; PERINEURAL at 08:58

## 2022-07-19 RX ADMIN — FENTANYL CITRATE 100 MCG: 50 INJECTION INTRAMUSCULAR; INTRAVENOUS at 08:58

## 2022-07-19 RX ADMIN — FENTANYL CITRATE 25 MCG: 50 INJECTION, SOLUTION INTRAMUSCULAR; INTRAVENOUS at 11:29

## 2022-07-19 RX ADMIN — CEFAZOLIN SODIUM 2000 MG: 1 INJECTION, POWDER, FOR SOLUTION INTRAMUSCULAR; INTRAVENOUS at 08:46

## 2022-07-19 RX ADMIN — PROPOFOL 200 MG: 10 INJECTION, EMULSION INTRAVENOUS at 08:58

## 2022-07-19 RX ADMIN — SODIUM CHLORIDE 125 ML/HR: 0.9 INJECTION, SOLUTION INTRAVENOUS at 07:42

## 2022-07-19 RX ADMIN — OXYCODONE AND ACETAMINOPHEN 1 TABLET: 5; 325 TABLET ORAL at 12:41

## 2022-07-19 RX ADMIN — SODIUM CHLORIDE: 0.9 INJECTION, SOLUTION INTRAVENOUS at 10:21

## 2022-07-19 NOTE — INTERVAL H&P NOTE
H&P reviewed  After examining the patient I find no changes in the patients condition since the H&P had been written      Vitals:    07/19/22 0728   BP: 169/77   Pulse: 64   Resp: 20   Temp: 98 6 °F (37 °C)   SpO2: 96%

## 2022-07-19 NOTE — ANESTHESIA POSTPROCEDURE EVALUATION
Post-Op Assessment Note    CV Status:  Stable  Pain Score: 2    Pain management: adequate     Mental Status:  Alert and awake   Hydration Status:  Euvolemic   PONV Controlled:  Controlled   Airway Patency:  Patent   Two or more mitigation strategies used for obstructive sleep apnea   Post Op Vitals Reviewed: Yes      Staff: Anesthesiologist, CRNA         No complications documented      /66 (07/19/22 1137)    Temp      Pulse 66 (07/19/22 1137)   Resp 12 (07/19/22 1137)    SpO2 95 % (07/19/22 1137)

## 2022-07-19 NOTE — DISCHARGE INSTRUCTIONS
[facility]  Dr Gogo Rondon  Post-Operative Instructions    Pain / Swelling  There is expected to be some discomfort, swelling and bruising of the foot  You might see some blood on the bandage  This is not a cause for alarm  However, if there is active or persistent bleeding (blood running out of the bandage while at rest) - call the office at once  Apply an ice bag to right ankle for 30 minutes for each waking hour, for the first 72 hours  This should be discontinued when sleeping  This will also work through your cast if you have one  Ice must not leak and wet the dressings  Also, using the ice inappropriately can cause permanent nerve damage and frostbite  Your foot should be elevated as much as possible for the first 72 hours  The foot should be above heart level  If your foot is below heart level, throbbing and pain will increase  When sleeping, elevation can be accomplished by putting a small hard suitcase between the box spring and mattress at the foot of the bed  Walking and standing will increase pain, throbbing and bleeding  Persistent pain despite elevation and your pain meds can many times be relieved by removing the tight brown compression layer (called the ACE wrap) that is over the white gauze dressing  If you are elevating and taking your pain meds and pain is still severe, remove this brown stretchy layer but leave the gauze intact  Wait 30 minutes  If the pain subsides, reapply the ACE so its not so tight  If pain doesnt get better, call your doctor  Dressings / Casts  Do not remove your surgical dressings - they will be changed at your doctor appointment  Do not allow surgical dressings to get wet  Sponge baths should be used until the sutures are removed  Do not try to keep the foot dry using a garbage bag and tape - this rarely works  If you get your dressings or cast wet - call your doctor immediately    If your cast or dressings feel tight - elevate your foot for 30 minutes  If this doesnt help and you feel tingling or see toe discoloration - call your doctor  Do not put things in your cast such as powder, coat hangers to scratch, etc  This can cause skin damage and infections  Infection  If you have a fever at or above 100 degrees, chills, sweats, or notice red streaks rising above the dressing or smell odor / see pus (creamy white drainage), call your doctor immediately  Constipation  If you have severe constipation after surgery, this can be due to the pain medication  Notify your doctor and special medication will be prescribed to deal with this  Numbness  It is normal for your foot to be numb until about dinner time  If youve had a popliteal block procedure, you might be numb until the following day  When you start to feel pins and needles in the foot - this means the block is wearing off  That is the appropriate time to take your pain medication  Pain Medication  Take your post-operative pain medication as directed by your surgeon  Do not supplement your pain medication with over the counter drugs, old leftover pain medications, or extra Tylenol  You must discuss any additional medications with your doctor prior to taking them for pain  Driving  No driving is allowed without discussion with the doctor  Ambulation  Nonweightbearing to surgical foot  If given a flat, stiff shoe / darco wedge shoe, Do not walk at all without it  Use a device (cane, walker, crutches) to take some weight off of the foot when walking  If instructed not to put weight on the surgical foot, use the following:  Rolling Walker     Putting weight on the foot will lead to complications

## 2022-07-19 NOTE — OP NOTE
OPERATIVE REPORT - Podiatry  PATIENT NAME: Edgar Tavera    :  1950  MRN: [id number]  Pt Location: [facility] OR ROOM 03    SURGERY DATE: 2022    Surgeon(s) and Role:     * Janis Medel, GENARO - Primary     * Yolette Peters DPM - Assisting     * Simin Mortensen DPM - Assisting    Pre-op Diagnosis:  Hallux rigidus of right foot [M20 21]    Post-Op Diagnosis Codes:     * Hallux rigidus of right foot [M20 21]    Procedure(s) (LRB):  1st metatarsal phalangeal joint fusion (Right)    Specimen(s):  * No specimens in log *    Estimated Blood Loss:   20 mL    Drains:  * No LDAs found *    Anesthesia Type:   IV Sedation with Anesthesia with 15 ml of 1% Lidocaine and 0 25% Bupivacaine in a 1:1 mixture prior to incision  Hemostasis:  Pneumatic ankle tourniquet, anatomic dissection, electrocautery    Materials:  Implant Name Type Inv  Item Serial No   Lot No  LRB No  Used Action   GRAFT BONE PUTTY 1ML - [id number]  GRAFT BONE PUTTY 1ML [id number] MUSCULOSKELETAL TRANSPLAN  [id number] Right 1 Explanted   GUIDEQWIRE      Right 1 Implanted   MTP PLATE, PETITE      Right 1 Implanted   SCREW ARTIS 3 X 14MM MTP TI - [id number]  SCREW ARTIS 3 X 14MM MTP TI  [employer]  Right 1 Implanted   RAYMUNDO SCREW Screw     Right 1 Implanted   SCREW RAYMUNDO 1 6 X 12MM - [id number]  SCREW RAYMUNDO 1 6 X 12MM  [employer]  Right 1 Implanted   SCREW RAYMUNDO LCK 3 X 14MM LOPRFL - [id number]  SCREW RAYMUNDO LCK 3 X 14MM LOPRFL  [employer]  Right 1 Implanted   RAYMUNDO SCREW     [id number] Right 1 Implanted   CORTICAL SCREW      Right 1 Implanted   CORTICAl screw      Right 1 Implanted     2-0 Vicryl  3-0 Vicryl  4-0 nylon    Operative Findings:  Consistent with diagnosis    Complications:   None    Procedure and Technique:     Under mild sedation, the patient was brought into the operating room and placed on the operating room table in the supine position   IV sedation was achieved by anesthesia team and a universal timeout was performed where all parties are in agreement of correct patient, correct procedure and correct site  A pneumatic tourniquet was then placed over the patient's right lower extremity with ample padding  The foot was then prepped and draped in the usual aseptic manner  An esmarch bandage was used to exsangunate the foot and the pneumatic tourniquet was then inflated to 250mmHg  Attention was then directed to dorsomedial aspect of the Right foot where an approximately 6cm dorsolinear incision was made  This incision was deepened to subcutaneous tissue with a sterile 15 blade  All vital neurovascular structures were identified and then retracted, all bleeders were identified and cauterized  The EHL was then identified and retracted laterally  A capsular incision was then made and all capsular and periosteal structures were reflected off of the 1st metatarsal head and the base of the proximal phalanx, all soft tissue attachments were reflected off the 1st metatarsal head and base of the proximal phalanx  Upon visualization of the MTPJ, it was noted that there was cartilage destruction to 1st metatarsal head and prominent osteophyte formation circumferentially around the joint  A bur r was then utilized to remove all cartilaginous surfaces from the head of the first metatarsal and the base of the proximal phalanx down to bleeding subchondral bone in the standard fashion  The surgical incision was irrigated with copious amounts of normal sterile saline  The 1st metatarsal head and proximal phalanx base were then fenestrated utilizing a 2-0 drill bit  These surfaces were then fish-scaled utilizing an osteotome and mallet  The joint was placed in anatomic position and temporally fixated with a guidewire from the medial proximal phalanx into the lateral 1st metatarsal head  The Arthrex plate was to above was then placed overlying the joint and temporally fixated using BB tacks    The most distal hole was then filled with a bicortical screw as above  The medial lateral holes were filled with locking screws as above  The BB tacks were then removed  The compression sleeve was then inserted into the oblong hole and drilled  The Arthrex compression device was then utilized to create compression across the 1st MTPJ and the plate was again temporally fixated proximally with BB tack  The most proximal hole in the plate was then drilled and and a locking screw was placed  Given the patient's bone stock the compression screw within the oblong compression slot was not placed  The decision was made to insert a compression screw over the temporarily fixating guidewire  Attention was directed to the medial aspect of the MTPJ  A stab incision was made overlying the wire  Soft tissue extracted along the lateral aspect of the 1st MTPJ to ensure that the wire was sitting flush within the bone  The screw was then measured and the hole was drilled  The headless compression screw was then placed from the medial proximal phalanx base to the lateral aspect of 1st metatarsal head  The forefoot was then loaded the toe was noted to be sitting in an anatomic position   bone matrix was then placed within the compression device hole and surrounding the 1st MTPJ  The surgical incision was flushed with copious amounts of normal sterile saline  The periosteal and capsular structures were reapproximated using 2-0 Vicryl  Subcutaneous closure was obtained utilizing 3-0 Vicryl  Skin edges were reapproximated and closure was obtained utilizing 4-0  Nylon  The foot was then cleansed and dried  A postoperative injection consisting of 5 ml of 0 25% Bupivacaine and 1% lidocaine in 1-1 mixture was performed  The incision site was dressed with Xeroform and a dry sterile dressing  The tourniquet was deflated and normal hyperemic flush was noted to all digits   The patient tolerated the procedure and anesthesia well and was transported to the PACU with vital signs stable  Dr Héctor Spangler was present during the entire procedure and participated in all key aspects  SIGNATURE: Josie Painting DPM  DATE: July 19, 2022  TIME: 10:48 AM      Portions of the record may have been created with voice recognition software  Occasional wrong word or "sound a like" substitutions may have occurred due to the inherent limitations of voice recognition software  Read the chart carefully and recognize, using context, where substitutions have occurred

## 2022-07-19 NOTE — ANESTHESIA PREPROCEDURE EVALUATION
Procedure:  1st metatarsal phalangeal joint fusion (Right First Toe)    Relevant Problems   CARDIO   (+) Other chest pain      ENDO   (+) Type 2 diabetes mellitus with diabetic chronic kidney disease (HCC)   (+) Type 2 diabetes mellitus with diabetic nephropathy, with long-term current use of insulin (HCC)      GI/HEPATIC   (+) GERD (gastroesophageal reflux disease)      /RENAL   (+) ANDRA (acute kidney injury) (HCC)   (+) Anemia of chronic renal failure, stage 3 (moderate) (HCC)   (+) Diabetic nephropathy associated with type 2 diabetes mellitus (HCC)   (+) Hypertensive chronic kidney disease with stage 1 through stage 4 chronic kidney disease, or unspecified chronic kidney disease   (+) Stage 4 chronic kidney disease (HCC)      HEMATOLOGY   (+) Anemia in stage 4 chronic kidney disease (HCC)   (+) Anemia of chronic renal failure, stage 3 (moderate) (HCC)      MUSCULOSKELETAL   (+) Fibromyalgia      NEURO/PSYCH   (+) Depression, recurrent (HCC)   (+) Fibromyalgia   (+) Gastroparesis diabeticorum (HCC)   (+) History of colon polyps   (+) History of kidney stones      PULMONARY   (+) COPD (chronic obstructive pulmonary disease) (HCC)   (+) Chronic respiratory failure with hypoxia (HCC)   (+) Dyspnea   (+) BARBARA (obstructive sleep apnea)        Physical Exam    Airway    Mallampati score: III  TM Distance: >3 FB  Neck ROM: full     Dental   No notable dental hx     Cardiovascular  Rhythm: regular, Rate: normal, Cardiovascular exam normal    Pulmonary  Pulmonary exam normal Breath sounds clear to auscultation,     Other Findings        Anesthesia Plan  ASA Score- 3     Anesthesia Type- IV sedation with anesthesia with ASA Monitors  Additional Monitors:   Airway Plan:     Comment: Had inspire device implanted 11/2021    Patient describes hx of aspiration requiring intubation in the past  Also describes history of severe GERD  I discussed intubation with the patient and explained the reasoning for it   Patient accepts this plan  Plan Factors-    Chart reviewed  Patient summary reviewed  Patient is not a current smoker  Patient not instructed to abstain from smoking on day of procedure  Patient did not smoke on day of surgery  There is medical exclusion for perioperative obstructive sleep apnea risk education  Induction- intravenous  Postoperative Plan-     Informed Consent- Anesthetic plan and risks discussed with patient and spouse

## 2022-07-19 NOTE — DISCHARGE SUMMARY
Discharge Summary Outpatient Procedure Podiatry -   Sasha Ardon 67 y o  female MRN: [id number]  Unit/Bed#: OR [place] Encounter: [id number]    Admission Date: 7/19/2022     Admitting Diagnosis: Hallux rigidus of right foot [M20 21]    Discharge Diagnosis: same    Procedures Performed: 1st metatarsal phalangeal joint fusion: 45026 (CPT®)    Complications: none    Condition at Discharge: stable    Discharge instructions/Information to patient and family:   See after visit summary for information provided to patient and family  Provisions for Follow-Up Care/Important appointments:  See after visit summary for information related to follow-up care and any pertinent home health orders  Discharge Medications:  See after visit summary for reconciled discharge medications provided to patient and family

## 2022-07-20 ENCOUNTER — APPOINTMENT (EMERGENCY)
Dept: RADIOLOGY | Facility: HOSPITAL | Age: 72
DRG: 177 | End: 2022-07-20
Payer: MEDICARE

## 2022-07-20 ENCOUNTER — HOSPITAL ENCOUNTER (INPATIENT)
Facility: HOSPITAL | Age: 72
LOS: 2 days | Discharge: HOME | DRG: 177 | End: 2022-07-22
Attending: EMERGENCY MEDICINE | Admitting: INTERNAL MEDICINE
Payer: MEDICARE

## 2022-07-20 ENCOUNTER — APPOINTMENT (INPATIENT)
Dept: NUCLEAR MEDICINE | Facility: HOSPITAL | Age: 72
DRG: 177 | End: 2022-07-20
Payer: MEDICARE

## 2022-07-20 DIAGNOSIS — R09.02 HYPOXIA: Primary | ICD-10-CM

## 2022-07-20 DIAGNOSIS — Z98.890 S/P FOOT SURGERY: ICD-10-CM

## 2022-07-20 DIAGNOSIS — U07.1 COVID-19: ICD-10-CM

## 2022-07-20 DIAGNOSIS — R06.02 SOB (SHORTNESS OF BREATH): ICD-10-CM

## 2022-07-20 LAB
ALBUMIN SERPL BCP-MCNC: 3.7 G/DL (ref 3.5–5)
ALP SERPL-CCNC: 92 U/L (ref 34–104)
ALT SERPL W P-5'-P-CCNC: 49 U/L (ref 7–52)
ANION GAP SERPL CALCULATED.3IONS-SCNC: 7 MMOL/L (ref 4–13)
APTT PPP: 26 SECONDS (ref 23–37)
APTT PPP: 76 SECONDS (ref 23–37)
AST SERPL W P-5'-P-CCNC: 92 U/L (ref 13–39)
ATRIAL RATE: 61 BPM
ATRIAL RATE: 67 BPM
ATRIAL RATE: 69 BPM
BASE EX.OXY STD BLDV CALC-SCNC: 78 % (ref 60–80)
BASE EXCESS BLDV CALC-SCNC: -2.8 MMOL/L
BASOPHILS # BLD AUTO: 0.01 THOUSANDS/ΜL (ref 0–0.1)
BASOPHILS NFR BLD AUTO: 0 % (ref 0–1)
BILIRUB SERPL-MCNC: 0.39 MG/DL (ref 0.2–1)
BUN SERPL-MCNC: 31 MG/DL (ref 5–25)
CALCIUM SERPL-MCNC: 8.5 MG/DL (ref 8.4–10.2)
CHLORIDE SERPL-SCNC: 104 MMOL/L (ref 96–108)
CO2 SERPL-SCNC: 27 MMOL/L (ref 21–32)
CREAT SERPL-MCNC: 1.87 MG/DL (ref 0.6–1.3)
D DIMER PPP FEU-MCNC: 0.58 UG/ML FEU
EOSINOPHIL # BLD AUTO: 0.26 THOUSAND/ΜL (ref 0–0.61)
EOSINOPHIL NFR BLD AUTO: 3 % (ref 0–6)
ERYTHROCYTE [DISTWIDTH] IN BLOOD BY AUTOMATED COUNT: 14.3 % (ref 11.6–15.1)
FLUAV RNA RESP QL NAA+PROBE: NEGATIVE
FLUBV RNA RESP QL NAA+PROBE: NEGATIVE
GFR SERPL CREATININE-BSD FRML MDRD: 26 ML/MIN/1.73SQ M
GLUCOSE SERPL-MCNC: 137 MG/DL (ref 65–140)
GLUCOSE SERPL-MCNC: 206 MG/DL (ref 65–140)
GLUCOSE SERPL-MCNC: 90 MG/DL (ref 65–140)
HCO3 BLDV-SCNC: 24 MMOL/L (ref 24–30)
HCT VFR BLD AUTO: 38.1 % (ref 34.8–46.1)
HGB BLD-MCNC: 12 G/DL (ref 11.5–15.4)
IMM GRANULOCYTES # BLD AUTO: 0.06 THOUSAND/UL (ref 0–0.2)
IMM GRANULOCYTES NFR BLD AUTO: 1 % (ref 0–2)
INR PPP: 0.93 (ref 0.84–1.19)
LYMPHOCYTES # BLD AUTO: 1.93 THOUSANDS/ΜL (ref 0.6–4.47)
LYMPHOCYTES NFR BLD AUTO: 20 % (ref 14–44)
MCH RBC QN AUTO: 29.1 PG (ref 26.8–34.3)
MCHC RBC AUTO-ENTMCNC: 31.5 G/DL (ref 31.4–37.4)
MCV RBC AUTO: 93 FL (ref 82–98)
MONOCYTES # BLD AUTO: 0.68 THOUSAND/ΜL (ref 0.17–1.22)
MONOCYTES NFR BLD AUTO: 7 % (ref 4–12)
NEUTROPHILS # BLD AUTO: 6.97 THOUSANDS/ΜL (ref 1.85–7.62)
NEUTS SEG NFR BLD AUTO: 69 % (ref 43–75)
NRBC BLD AUTO-RTO: 0 /100 WBCS
O2 CT BLDV-SCNC: 13.6 ML/DL
P AXIS: 53 DEGREES
P AXIS: 55 DEGREES
P AXIS: 55 DEGREES
PCO2 BLDV: 49.7 MM HG (ref 42–50)
PH BLDV: 7.3 [PH] (ref 7.3–7.4)
PLATELET # BLD AUTO: 233 THOUSANDS/UL (ref 149–390)
PMV BLD AUTO: 12.1 FL (ref 8.9–12.7)
PO2 BLDV: 48.1 MM HG (ref 35–45)
POTASSIUM SERPL-SCNC: 5.3 MMOL/L (ref 3.5–5.3)
PR INTERVAL: 226 MS
PR INTERVAL: 228 MS
PR INTERVAL: 262 MS
PROT SERPL-MCNC: 6.6 G/DL (ref 6.4–8.4)
PROTHROMBIN TIME: 12.4 SECONDS (ref 11.6–14.5)
QRS AXIS: 10 DEGREES
QRS AXIS: 21 DEGREES
QRS AXIS: 39 DEGREES
QRSD INTERVAL: 80 MS
QRSD INTERVAL: 84 MS
QRSD INTERVAL: 88 MS
QT INTERVAL: 388 MS
QT INTERVAL: 404 MS
QT INTERVAL: 426 MS
QTC INTERVAL: 426 MS
QTC INTERVAL: 427 MS
QTC INTERVAL: 428 MS
RBC # BLD AUTO: 4.12 MILLION/UL (ref 3.81–5.12)
RSV RNA RESP QL NAA+PROBE: NEGATIVE
SARS-COV-2 RNA RESP QL NAA+PROBE: POSITIVE
SODIUM SERPL-SCNC: 138 MMOL/L (ref 135–147)
T WAVE AXIS: 121 DEGREES
T WAVE AXIS: 50 DEGREES
T WAVE AXIS: 56 DEGREES
VENTRICULAR RATE: 61 BPM
VENTRICULAR RATE: 67 BPM
VENTRICULAR RATE: 73 BPM
WBC # BLD AUTO: 9.91 THOUSAND/UL (ref 4.31–10.16)

## 2022-07-20 PROCEDURE — 80053 COMPREHEN METABOLIC PANEL: CPT

## 2022-07-20 PROCEDURE — 85025 COMPLETE CBC W/AUTO DIFF WBC: CPT

## 2022-07-20 PROCEDURE — 85730 THROMBOPLASTIN TIME PARTIAL: CPT | Performed by: INTERNAL MEDICINE

## 2022-07-20 PROCEDURE — 99223 1ST HOSP IP/OBS HIGH 75: CPT | Performed by: INTERNAL MEDICINE

## 2022-07-20 PROCEDURE — 99285 EMERGENCY DEPT VISIT HI MDM: CPT

## 2022-07-20 PROCEDURE — 36415 COLL VENOUS BLD VENIPUNCTURE: CPT

## 2022-07-20 PROCEDURE — 99285 EMERGENCY DEPT VISIT HI MDM: CPT | Performed by: EMERGENCY MEDICINE

## 2022-07-20 PROCEDURE — A9540 TC99M MAA: HCPCS

## 2022-07-20 PROCEDURE — 82948 REAGENT STRIP/BLOOD GLUCOSE: CPT

## 2022-07-20 PROCEDURE — 85379 FIBRIN DEGRADATION QUANT: CPT

## 2022-07-20 PROCEDURE — 93010 ELECTROCARDIOGRAM REPORT: CPT | Performed by: INTERNAL MEDICINE

## 2022-07-20 PROCEDURE — 85610 PROTHROMBIN TIME: CPT

## 2022-07-20 PROCEDURE — G1004 CDSM NDSC: HCPCS

## 2022-07-20 PROCEDURE — 93005 ELECTROCARDIOGRAM TRACING: CPT

## 2022-07-20 PROCEDURE — 85730 THROMBOPLASTIN TIME PARTIAL: CPT | Performed by: PHYSICIAN ASSISTANT

## 2022-07-20 PROCEDURE — 71045 X-RAY EXAM CHEST 1 VIEW: CPT

## 2022-07-20 PROCEDURE — 0241U HB NFCT DS VIR RESP RNA 4 TRGT: CPT

## 2022-07-20 PROCEDURE — 78580 LUNG PERFUSION IMAGING: CPT

## 2022-07-20 PROCEDURE — 82805 BLOOD GASES W/O2 SATURATION: CPT

## 2022-07-20 RX ORDER — ALBUTEROL SULFATE 90 UG/1
2 AEROSOL, METERED RESPIRATORY (INHALATION) EVERY 4 HOURS PRN
Status: DISCONTINUED | OUTPATIENT
Start: 2022-07-20 | End: 2022-07-22 | Stop reason: HOSPADM

## 2022-07-20 RX ORDER — SACCHAROMYCES BOULARDII 250 MG
250 CAPSULE ORAL 2 TIMES DAILY
Status: DISCONTINUED | OUTPATIENT
Start: 2022-07-20 | End: 2022-07-22 | Stop reason: HOSPADM

## 2022-07-20 RX ORDER — HEPARIN SODIUM 1000 [USP'U]/ML
4000 INJECTION, SOLUTION INTRAVENOUS; SUBCUTANEOUS ONCE
Status: COMPLETED | OUTPATIENT
Start: 2022-07-20 | End: 2022-07-20

## 2022-07-20 RX ORDER — OXYCODONE HYDROCHLORIDE 5 MG/1
2.5 TABLET ORAL EVERY 4 HOURS PRN
Status: DISCONTINUED | OUTPATIENT
Start: 2022-07-20 | End: 2022-07-22 | Stop reason: HOSPADM

## 2022-07-20 RX ORDER — AMLODIPINE BESYLATE 5 MG/1
5 TABLET ORAL DAILY
Status: DISCONTINUED | OUTPATIENT
Start: 2022-07-20 | End: 2022-07-22 | Stop reason: HOSPADM

## 2022-07-20 RX ORDER — GUAIFENESIN 600 MG/1
600 TABLET, EXTENDED RELEASE ORAL EVERY 12 HOURS SCHEDULED
Status: DISCONTINUED | OUTPATIENT
Start: 2022-07-20 | End: 2022-07-22 | Stop reason: HOSPADM

## 2022-07-20 RX ORDER — ATORVASTATIN CALCIUM 40 MG/1
40 TABLET, FILM COATED ORAL
Status: DISCONTINUED | OUTPATIENT
Start: 2022-07-20 | End: 2022-07-22 | Stop reason: HOSPADM

## 2022-07-20 RX ORDER — LEVOTHYROXINE SODIUM 112 UG/1
112 TABLET ORAL
Status: DISCONTINUED | OUTPATIENT
Start: 2022-07-20 | End: 2022-07-22 | Stop reason: HOSPADM

## 2022-07-20 RX ORDER — ACETAMINOPHEN 325 MG/1
650 TABLET ORAL EVERY 6 HOURS PRN
Status: DISCONTINUED | OUTPATIENT
Start: 2022-07-20 | End: 2022-07-22 | Stop reason: HOSPADM

## 2022-07-20 RX ORDER — CHLORAL HYDRATE 500 MG
1000 CAPSULE ORAL DAILY
Status: DISCONTINUED | OUTPATIENT
Start: 2022-07-20 | End: 2022-07-22 | Stop reason: HOSPADM

## 2022-07-20 RX ORDER — TORSEMIDE 20 MG/1
20 TABLET ORAL DAILY
Status: DISCONTINUED | OUTPATIENT
Start: 2022-07-20 | End: 2022-07-22 | Stop reason: HOSPADM

## 2022-07-20 RX ORDER — INSULIN LISPRO 100 [IU]/ML
1-6 INJECTION, SOLUTION INTRAVENOUS; SUBCUTANEOUS
Status: DISCONTINUED | OUTPATIENT
Start: 2022-07-20 | End: 2022-07-22 | Stop reason: HOSPADM

## 2022-07-20 RX ORDER — HEPARIN SODIUM 1000 [USP'U]/ML
2000 INJECTION, SOLUTION INTRAVENOUS; SUBCUTANEOUS
Status: DISCONTINUED | OUTPATIENT
Start: 2022-07-20 | End: 2022-07-22 | Stop reason: HOSPADM

## 2022-07-20 RX ORDER — DEXAMETHASONE SODIUM PHOSPHATE 4 MG/ML
6 INJECTION, SOLUTION INTRA-ARTICULAR; INTRALESIONAL; INTRAMUSCULAR; INTRAVENOUS; SOFT TISSUE EVERY 24 HOURS
Status: DISCONTINUED | OUTPATIENT
Start: 2022-07-20 | End: 2022-07-22 | Stop reason: HOSPADM

## 2022-07-20 RX ORDER — METOPROLOL TARTRATE 50 MG/1
50 TABLET, FILM COATED ORAL EVERY 12 HOURS SCHEDULED
Status: DISCONTINUED | OUTPATIENT
Start: 2022-07-20 | End: 2022-07-22 | Stop reason: HOSPADM

## 2022-07-20 RX ORDER — ERGOCALCIFEROL (VITAMIN D2) 50 MCG
2000 CAPSULE ORAL DAILY
Status: DISCONTINUED | OUTPATIENT
Start: 2022-07-20 | End: 2022-07-20

## 2022-07-20 RX ORDER — FLUTICASONE PROPIONATE 50 MCG
2 SPRAY, SUSPENSION (ML) NASAL DAILY
Status: DISCONTINUED | OUTPATIENT
Start: 2022-07-20 | End: 2022-07-22 | Stop reason: HOSPADM

## 2022-07-20 RX ORDER — HEPARIN SODIUM 1000 [USP'U]/ML
4000 INJECTION, SOLUTION INTRAVENOUS; SUBCUTANEOUS
Status: DISCONTINUED | OUTPATIENT
Start: 2022-07-20 | End: 2022-07-22 | Stop reason: HOSPADM

## 2022-07-20 RX ORDER — MELATONIN
2000 DAILY
Status: DISCONTINUED | OUTPATIENT
Start: 2022-07-20 | End: 2022-07-22 | Stop reason: HOSPADM

## 2022-07-20 RX ORDER — GABAPENTIN 100 MG/1
100 CAPSULE ORAL DAILY
Status: DISCONTINUED | OUTPATIENT
Start: 2022-07-20 | End: 2022-07-22 | Stop reason: HOSPADM

## 2022-07-20 RX ORDER — HEPARIN SODIUM 10000 [USP'U]/100ML
3-20 INJECTION, SOLUTION INTRAVENOUS
Status: DISCONTINUED | OUTPATIENT
Start: 2022-07-20 | End: 2022-07-22 | Stop reason: HOSPADM

## 2022-07-20 RX ORDER — PANTOPRAZOLE SODIUM 40 MG/1
40 TABLET, DELAYED RELEASE ORAL
Status: DISCONTINUED | OUTPATIENT
Start: 2022-07-21 | End: 2022-07-22 | Stop reason: HOSPADM

## 2022-07-20 RX ORDER — INSULIN LISPRO 100 [IU]/ML
16 INJECTION, SOLUTION INTRAVENOUS; SUBCUTANEOUS
Status: DISCONTINUED | OUTPATIENT
Start: 2022-07-20 | End: 2022-07-22 | Stop reason: HOSPADM

## 2022-07-20 RX ORDER — INSULIN GLARGINE 100 [IU]/ML
50 INJECTION, SOLUTION SUBCUTANEOUS
Status: DISCONTINUED | OUTPATIENT
Start: 2022-07-20 | End: 2022-07-22 | Stop reason: HOSPADM

## 2022-07-20 RX ORDER — PRAMIPEXOLE DIHYDROCHLORIDE 0.25 MG/1
0.25 TABLET ORAL
Status: DISCONTINUED | OUTPATIENT
Start: 2022-07-20 | End: 2022-07-22 | Stop reason: HOSPADM

## 2022-07-20 RX ORDER — LOSARTAN POTASSIUM 50 MG/1
50 TABLET ORAL DAILY
Status: DISCONTINUED | OUTPATIENT
Start: 2022-07-20 | End: 2022-07-22 | Stop reason: HOSPADM

## 2022-07-20 RX ORDER — ACETAMINOPHEN 325 MG/1
650 TABLET ORAL ONCE
Status: COMPLETED | OUTPATIENT
Start: 2022-07-20 | End: 2022-07-20

## 2022-07-20 RX ORDER — FAMOTIDINE 20 MG/1
10 TABLET, FILM COATED ORAL
Status: DISCONTINUED | OUTPATIENT
Start: 2022-07-20 | End: 2022-07-22 | Stop reason: HOSPADM

## 2022-07-20 RX ORDER — INSULIN LISPRO 100 [IU]/ML
16 INJECTION, SOLUTION INTRAVENOUS; SUBCUTANEOUS
Status: DISCONTINUED | OUTPATIENT
Start: 2022-07-21 | End: 2022-07-22 | Stop reason: HOSPADM

## 2022-07-20 RX ORDER — OXYCODONE HYDROCHLORIDE 5 MG/1
5 TABLET ORAL EVERY 4 HOURS PRN
Status: DISCONTINUED | OUTPATIENT
Start: 2022-07-20 | End: 2022-07-22 | Stop reason: HOSPADM

## 2022-07-20 RX ORDER — KRILL/OM-3/DHA/EPA/PHOSPHO/AST 500-110 MG
1000 CAPSULE ORAL 2 TIMES DAILY
Status: DISCONTINUED | OUTPATIENT
Start: 2022-07-20 | End: 2022-07-20

## 2022-07-20 RX ORDER — INSULIN LISPRO 100 [IU]/ML
25 INJECTION, SOLUTION INTRAVENOUS; SUBCUTANEOUS
Status: DISCONTINUED | OUTPATIENT
Start: 2022-07-20 | End: 2022-07-22 | Stop reason: HOSPADM

## 2022-07-20 RX ADMIN — REMDESIVIR 200 MG: 100 INJECTION, POWDER, LYOPHILIZED, FOR SOLUTION INTRAVENOUS at 13:56

## 2022-07-20 RX ADMIN — INSULIN LISPRO 25 UNITS: 100 INJECTION, SOLUTION INTRAVENOUS; SUBCUTANEOUS at 18:52

## 2022-07-20 RX ADMIN — OXYCODONE HYDROCHLORIDE 2.5 MG: 5 TABLET ORAL at 21:24

## 2022-07-20 RX ADMIN — Medication 250 MG: at 21:25

## 2022-07-20 RX ADMIN — GUAIFENESIN 600 MG: 600 TABLET ORAL at 13:50

## 2022-07-20 RX ADMIN — Medication 250 MG: at 14:03

## 2022-07-20 RX ADMIN — Medication 2000 UNITS: at 13:50

## 2022-07-20 RX ADMIN — INSULIN GLARGINE 50 UNITS: 100 INJECTION, SOLUTION SUBCUTANEOUS at 21:24

## 2022-07-20 RX ADMIN — FLUTICASONE PROPIONATE 2 SPRAY: 50 SPRAY, METERED NASAL at 13:49

## 2022-07-20 RX ADMIN — DEXAMETHASONE SODIUM PHOSPHATE 6 MG: 4 INJECTION INTRA-ARTICULAR; INTRALESIONAL; INTRAMUSCULAR; INTRAVENOUS; SOFT TISSUE at 13:49

## 2022-07-20 RX ADMIN — OXYCODONE HYDROCHLORIDE 5 MG: 5 TABLET ORAL at 12:24

## 2022-07-20 RX ADMIN — ACETAMINOPHEN 650 MG: 325 TABLET ORAL at 10:40

## 2022-07-20 RX ADMIN — GUAIFENESIN 600 MG: 600 TABLET ORAL at 21:25

## 2022-07-20 RX ADMIN — OMEGA-3 FATTY ACIDS CAP 1000 MG 1000 MG: 1000 CAP at 14:03

## 2022-07-20 RX ADMIN — METOPROLOL TARTRATE 50 MG: 50 TABLET, FILM COATED ORAL at 21:25

## 2022-07-20 RX ADMIN — HEPARIN SODIUM 12 UNITS/KG/HR: 10000 INJECTION, SOLUTION INTRAVENOUS at 15:52

## 2022-07-20 RX ADMIN — INSULIN LISPRO 2 UNITS: 100 INJECTION, SOLUTION INTRAVENOUS; SUBCUTANEOUS at 18:53

## 2022-07-20 RX ADMIN — PRAMIPEXOLE DIHYDROCHLORIDE 0.25 MG: 0.25 TABLET ORAL at 21:25

## 2022-07-20 RX ADMIN — FAMOTIDINE 10 MG: 20 TABLET, FILM COATED ORAL at 21:25

## 2022-07-20 RX ADMIN — HEPARIN SODIUM 4000 UNITS: 1000 INJECTION INTRAVENOUS; SUBCUTANEOUS at 15:50

## 2022-07-20 RX ADMIN — ATORVASTATIN CALCIUM 40 MG: 40 TABLET, FILM COATED ORAL at 18:13

## 2022-07-20 RX ADMIN — ACETAMINOPHEN 650 MG: 325 TABLET ORAL at 18:25

## 2022-07-20 NOTE — ASSESSMENT & PLAN NOTE
· Continue home medicine including Norvasc, Lopressor, Demadex and substitute losartan for olmesartan

## 2022-07-20 NOTE — ED NOTES
Report given to JAYSHREE King  Made RN aware that pt still needs heparin drip started when pt returns from VQ scan       Diana Kellogg RN  07/20/22 [id number]

## 2022-07-20 NOTE — ASSESSMENT & PLAN NOTE
Lab Results   Component Value Date    EGFR 26 07/20/2022    EGFR 30 05/27/2022    EGFR 30 05/19/2022    CREATININE 1 87 (H) 07/20/2022    CREATININE 1 64 (H) 05/27/2022    CREATININE 1 66 (H) 05/19/2022   · Follows with Nephrology  Baseline typically 1 6-2  Patient remains within baseline    Should have daily labs including potassium  · Avoid nephrotoxic agents, monitor volume status

## 2022-07-20 NOTE — ASSESSMENT & PLAN NOTE
· Vaccinated patient, tested positive for COVID-19 on admission    Symptoms include cough, shortness of breath, hypoxia and nasal congestion  · Follow mild pathway including IV remdesivir and IV Decadron  · Monitor oxygen saturations

## 2022-07-20 NOTE — ASSESSMENT & PLAN NOTE
Lab Results   Component Value Date    HGBA1C 6 8 (H) 07/07/2022     Recent Labs     07/19/22  0742 07/19/22  1159   POCGLU 129 116     Blood Sugar Average: Last 72 hrs:  · optimal blood glucose control history, follows with endocrinology, has CGM and at home takes levemir 50 units hs and mealtime insulin [id number] u  · Monitor on Accu-Cheks with additional sliding scale coverage as she is likely to have steroid induced hyperglycemia

## 2022-07-20 NOTE — ASSESSMENT & PLAN NOTE
· Patient with underlying history of COPD, treated sleep apnea and pulmonary hypertension who presents with sudden-onset shortness of breath in the middle of the night with confirmed hypoxia readings at home and also upon arrival to the ER   · Suspect this is due to [address], however V/Q scan is also planned to rule out PE in the setting of mildly elevated D-dimer and recent surgery  · Continue supportive oxygen and wean off as able

## 2022-07-20 NOTE — ED NOTES
Pt returns from VQ scan to room ED 15 at this time   Heparin drip started and double checked by [facility] JAYSHREE VERA  07/20/22 [id number]

## 2022-07-20 NOTE — H&P
[facility]  [facility]P- Lourdes Huang 1950, 67 y o  female MRN: [id number]  Unit/Bed#: ED 15 Encounter: [id number]  Primary Care Provider: Waldo Ya MD   Date and time admitted to hospital: 7/20/2022  6:14 AM    * Acute respiratory failure with hypoxia [facility])  Assessment & Plan  · Patient with underlying history of COPD, treated sleep apnea and pulmonary hypertension who presents with sudden-onset shortness of breath in the middle of the night with confirmed hypoxia readings at home and also upon arrival to the ER   · Suspect this is due to [address], however V/Q scan is also planned to rule out PE in the setting of mildly elevated D-dimer and recent surgery  · Continue supportive oxygen and wean off as able    COVID-19 virus infection  Assessment & Plan  · Vaccinated patient, tested positive for COVID-19 on admission  Symptoms include cough, shortness of breath, hypoxia and nasal congestion  · Follow mild pathway including IV remdesivir and IV Decadron  · Monitor oxygen saturations    S/P foot surgery  Assessment & Plan  · Patient is postop day 1 status post fusion right foot yesterday    Continue nonweightbearing to right lower extremity and use of walker  · Continue pain medication regimen  · Outpatient Podiatry follow-up    Type 2 diabetes mellitus with diabetic nephropathy, with long-term current use of insulin [facility])  Assessment & Plan  Lab Results   Component Value Date    HGBA1C 6 8 (H) 07/07/2022     Recent Labs     07/19/22  0742 07/19/22  1159   POCGLU 129 116     Blood Sugar Average: Last 72 hrs:  · optimal blood glucose control history, follows with endocrinology, has CGM and at home takes levemir 50 units hs and mealtime insulin [id number]-88 u  · Monitor on Accu-Cheks with additional sliding scale coverage as she is likely to have steroid induced hyperglycemia    Stage 4 chronic kidney disease [facility])  Assessment & Plan  Lab Results   Component Value Date    EGFR 26 07/20/2022 EGFR 30 05/27/2022    EGFR 30 05/19/2022    CREATININE 1 87 (H) 07/20/2022    CREATININE 1 64 (H) 05/27/2022    CREATININE 1 66 (H) 05/19/2022   · Follows with Nephrology  Baseline typically 1 6-2  Patient remains within baseline  Should have daily labs including potassium  · Avoid nephrotoxic agents, monitor volume status    Essential hypertension  Assessment & Plan  · Continue home medicine including Norvasc, Lopressor, Demadex and substitute losartan for olmesartan    BARBARA (obstructive sleep apnea)  Assessment & Plan  · Has inspire device, follows with pulmonology    VTE Pharmacologic Prophylaxis: VTE Score: 6 low-dose heparin drip per COVID protocol  Code Status: Level 3 - DNAR and DNI per discussion with patient  Discussion with family: Updated  () at bedside  Anticipated Length of Stay: Patient will be admitted on an inpatient basis with an anticipated length of stay of greater than 2 midnights secondary to hypoxia  Total Time for Visit, including Counseling / Coordination of Care: 60 minutes Greater than 50% of this total time spent on direct patient counseling and coordination of care  Case discussed with my attending    Chief Complaint: low oxygen level    History of Present Illness:  Haroon Dozier is a 67 y o  female with a PMH of diabetes, CKD 4, hypertension, sleep apnea who presents with shortness of breath with hypoxia noted at home last evening  Patient just underwent foot surgery yesterday and was utilizing Percocet at home for pain when she states she woke with significant shortness of breath and checked her oxygen with a pulse oximeter noting it was 82%  She presented to the hospital and was again noted to be mildly hypoxic in the upper 80 range at which point a COVID test was performed and noted to be positive  Patient denies any known COVID/ill contacts and was not experiencing any fevers, chills, myalgia, sore throat at home    Just today she started with a cough with mucus and shortness of breath which she did not have prior  She admits she did have some nasal congestion the past week which she attributed to her allergies  She denies any GI symptomatology and appetite has been okay  Currently however she also states she feels lousy with contributing factors including lack of oral intake today while in the ER and lack of pain medication for her foot  Review of Systems:  Review of Systems   Constitutional: Positive for activity change and fatigue  Negative for chills, fever and unexpected weight change  HENT: Positive for congestion and postnasal drip  Negative for sinus pressure, sore throat and trouble swallowing  Respiratory: Positive for cough, shortness of breath and wheezing  Cardiovascular: Negative for chest pain, palpitations and leg swelling  Gastrointestinal: Negative for abdominal distention, abdominal pain, constipation, diarrhea, nausea and vomiting  Genitourinary: Negative for decreased urine volume, difficulty urinating and dysuria  Musculoskeletal: Negative for arthralgias, back pain, gait problem and myalgias  Foot pain, from surgery   Skin: Negative for color change, pallor, rash and wound  Neurological: Negative for headaches  Psychiatric/Behavioral: Negative for confusion         Past Medical and Surgical History:   Past Medical History:   Diagnosis Date    Allergic     Anesthesia     pt reports "had to use double lumen endo tube for hiatal hernia repair/so surgeon could get to where he needed to work"    Arthritis     Aspiration into airway     Basal cell carcinoma 2007    left cheek     BCC (basal cell carcinoma) 05/27/2021    Left Nasal tip    Cancer [facility] )     squamous cell cancer on forhead    Cancer ([facility] )     basal cell on nose     Chronic kidney disease 2000, 2018    Stones, kidney disease stage 4    Chronic pain disorder     bilat feet and joint pain on occas    Colon polyp     Constipation     COPD [facility] obstructive pulmonary disease) [facility] )     COVID-19 08/2021    recovered at home/did receive monoclonal infusion    Dental bridge present     Depression     Diabetes mellitus ([facility] )     Type 2    Diabetic neuropathy ([place] [address] )     Disease of thyroid gland     Family history of thyroid problem     Fatty liver     GERD (gastroesophageal reflux disease)     Heart burn     Hiatal hernia     History of pneumonia     Hyperlipidemia     [facility]     Hypertension     Kidney problem     Kidney stone     Memory loss Julu2 2020    Motion sickness     Motion sickness     Neck pain     Obesity 1978    Obesity (BMI 30 0-34  9)     Pollen allergies     RLS (restless legs syndrome)     SCC (squamous cell carcinoma) 05/04/2021    left mid forehead    Seasonal allergies     Sleep apnea     has inspire    Squamous cell skin cancer 2007    left cheek     Swollen ankles     Urinary incontinence     Urinary tract infection 03/28/2022    Wears glasses        Past Surgical History:   Procedure Laterality Date    ARTHROSCOPY KNEE      BREAST BIOPSY      stereotactic-benign    BREAST BIOPSY      stereo-benign    BREAST EXCISIONAL BIOPSY      unknown date-benign    BREAST EXCISIONAL BIOPSY      unknown date-benign    BREAST EXCISIONAL BIOPSY      unknown date-benign    BREAST EXCISIONAL BIOPSY      unknown age-benign    CARDIAC CATHETERIZATION      CHOLECYSTECTOMY      COLONOSCOPY      EXAMINATION UNDER ANESTHESIA N/A 06/24/2021    Procedure: EXAM UNDER ANESTHESIA (EUA), DISE;  Surgeon: Low Olmedo MD;  Location: [facility] MAIN OR;  Service: ENT    HERNIA REPAIR      HIATAL HERNIA REPAIR      KNEE SURGERY      Torn manbgus lap surg    LIPOSUCTION      LYMPH NODE BIOPSY      MOHS SURGERY  05/20/2021    left mid forehead-Gautam    MOHS SURGERY Left 05/27/2021    Left nasal tip- gautam     [place] FOOT/TOES SURGERY PROC UNLISTED Right 7/19/2022    Procedure: 1st metatarsal phalangeal joint fusion;  Surgeon: Deneen Lopez DPM;  Location: [facility];  Service: Podiatry    TN INCISION IMPLANT CRANIAL NERVE STIM ELECTRODE/PULSE GEN N/A 11/10/2021    Procedure: INSERTION UPPER AIRWAY STIMULATOR, INSPIRE IMPLANT;  Surgeon: Evans Rosen MD;  Location: [facility];  Service: ENT    REDUCTION MAMMAPLASTY Bilateral 2000    REDUCTION MAMMAPLASTY      SKIN BIOPSY      SKIN CANCER EXCISION  2007    squamous cell carcinoma     SKIN CANCER EXCISION  2007    basal cell carcinoma    SQUAMOUS CELL CARCINOMA EXCISION      TOE SURGERY      TONSILLECTOMY      TUBAL LIGATION      UPPER GASTROINTESTINAL ENDOSCOPY      US GUIDED BREAST BIOPSY RIGHT COMPLETE Right 7/18/2022       Meds/Allergies:  Prior to Admission medications    Medication Sig Start Date End Date Taking? Authorizing Provider   acetaminophen (TYLENOL) 325 mg tablet Take 2 tablets (650 mg total) by mouth every 6 (six) hours as needed for mild pain, headaches or fever 4/14/22   Jaylin Bentley PA-C   albuterol (Ventolin HFA) 90 mcg/act inhaler Inhale 2 puffs every 6 (six) hours as needed for wheezing or shortness of breath 5/5/20   Rufina Peace PA-C   amLODIPine (NORVASC) 5 mg tablet TAKE 1 TABLET (5 MG TOTAL) BY MOUTH DAILY   6/5/22   Wayne Aguilar MD   B-D ULTRAFINE III SHORT PEN 31G X 8 MM MISC USE AS DIRECTED 4 TIMES PER DAY 7/26/19   Historical Provider, MD   DULoxetine HCl (CYMBALTA PO) Take 90 mg by mouth in the morning    Historical Provider, MD   famotidine (PEPCID) 10 mg tablet Take 10 mg by mouth daily at bedtime    Historical Provider, MD   fluticasone (FLONASE) 50 mcg/act nasal spray 1-2 sprays into each nostril daily  Patient taking differently: 1-2 sprays into each nostril if needed 1/13/22   Garrett Kan DO   gabapentin (NEURONTIN) 100 mg capsule Take 100 mg by mouth daily 12/14/21   Historical Provider, MD   insulin aspart (NovoLOG) 100 units/mL injection Inject under the skin 3 (three) times a day before meals 16 units, 16 units, 25 units  Historical Provider, MD   insulin detemir (Levemir FlexTouch) 100 Units/mL injection pen Inject 50 Units under the skin every evening  6/8/21   Historical Provider, MD Olguin [place] Oil (Omega-3) 500 MG CAPS Take 1,000 mg by mouth 2 (two) times a day 4/6/22 4/11/23  Historical Provider, MD   levothyroxine 112 mcg tablet Take 112 mcg by mouth every morning 4/6/22   Historical Provider, MD   metoprolol tartrate (LOPRESSOR) 50 mg tablet TAKE 1 TABLET (50 MG TOTAL) BY MOUTH EVERY 12 (TWELVE) HOURS 5/12/22   Diony Winslow MD   olmesartan (BENICAR) 20 mg tablet Take 0 5 tablets (10 mg total) by mouth 2 (two) times a day 6/23/22   Diony Winslow MD   oxyCODONE-acetaminophen (Percocet) 5-325 mg per tablet Take 1 tablet by mouth every 6 (six) hours as needed for moderate pain for up to 10 days Max Daily Amount: 4 tablets 7/19/22 7/29/22  Shantel Addison DPM   pantoprazole (PROTONIX) 40 mg tablet Take 1 tablet (40 mg total) by mouth in the morning  5/20/22 8/18/22  Marisol Canales MD   pramipexole (MIRAPEX) 0 25 mg tablet Take 0 25 mg by mouth daily at bedtime    Historical Provider, MD   Probiotic Product (PROBIOTIC PO) Take 1 capsule by mouth 2 (two) times a day    Historical Provider, MD   rosuvastatin (CRESTOR) 20 MG tablet Take 1 tablet (20 mg total) by mouth daily 4/1/22 7/19/22  Krystyna Baptiste DO   torsemide (DEMADEX) 20 mg tablet TAKE 1 TABLET BY MOUTH EVERY DAY 7/16/21   Diony Winslow MD   Vitamin D, Ergocalciferol, 2000 units CAPS Take 2,000 Units by mouth daily     Historical Provider, MD     I have reviewed home medications with patient personally  Allergies:    Allergies   Allergen Reactions    Sulfamethoxazole-Trimethoprim Other (See Comments)     Tongue swelling    Ciprofloxacin Hives and Itching       Social History:  Marital Status: /Civil Union   Occupation:   Patient Pre-hospital Living Situation: Home with   Patient Pre-hospital Level of Mobility: walks with walker currently due to foot surgery and being nonweightbearing to the right lower extremity  Patient Pre-hospital Diet Restrictions:  None  Substance Use History:   Social History     Substance and Sexual Activity   Alcohol Use Yes    Comment: very rare - 2 or 3 a year     Social History     Tobacco Use   Smoking Status Former Smoker    Packs/day: 2 00    Years: 10 00    Pack years: 20 00    Types: Cigarettes    Start date: 1/10/1968   Ya Munoz Quit date: 12    Years since quittin 5   Smokeless Tobacco Never Used   Tobacco Comment    Smoked 2 pack a day     Social History     Substance and Sexual Activity   Drug Use No       Family History:  Non contributory    Physical Exam:     Vitals:   Blood Pressure: 132/65 (22 1045)  Pulse: 60 (22 1145)  Temperature: 98 3 °F (36 8 °C) (22)  Respirations: 16 (22)  Height: 5' 3" (160 cm) (22)  Weight - Scale: 75 8 kg (167 lb) (22)  SpO2: 94 % (22 1000)    Physical Exam  Vitals reviewed  Constitutional:       General: She is not in acute distress  Appearance: She is obese  She is not ill-appearing, toxic-appearing or diaphoretic  HENT:      Nose: Congestion present  Mouth/Throat:      Pharynx: No oropharyngeal exudate  Eyes:      General: No scleral icterus  Right eye: No discharge  Left eye: No discharge  Conjunctiva/sclera: Conjunctivae normal    Cardiovascular:      Rate and Rhythm: Normal rate and regular rhythm  Heart sounds: No murmur heard  Pulmonary:      Effort: No respiratory distress  Breath sounds: No stridor  Wheezing (very slight upper airway wheezes) present  No rhonchi or rales  Comments: Cough with mucous  No dyspnea,tachypnea  O2 sat stable on 2L  Abdominal:      General: There is no distension  Palpations: Abdomen is soft  Tenderness: There is no abdominal tenderness  There is no guarding     Musculoskeletal:      Right lower leg: No edema  Left lower leg: No edema  Comments: Dressing on RLE   Skin:     General: Skin is warm and dry  Coloration: Skin is not jaundiced or pale  Findings: No bruising, erythema, lesion or rash  Neurological:      Mental Status: She is alert  Comments: Drowsy but arousable and able to answer questions and follow commands   Psychiatric:         Mood and Affect: Mood normal          Thought Content: Thought content normal           Additional Data:     Lab Results:  Results from last 7 days   Lab Units 07/20/22  0646   WBC Thousand/uL 9 91   HEMOGLOBIN g/dL 12 0   HEMATOCRIT % 38 1   PLATELETS Thousands/uL 233   NEUTROS PCT % 69   LYMPHS PCT % 20   MONOS PCT % 7   EOS PCT % 3     Results from last 7 days   Lab Units 07/20/22  0646   SODIUM mmol/L 138   POTASSIUM mmol/L 5 3   CHLORIDE mmol/L 104   CO2 mmol/L 27   BUN mg/dL 31*   CREATININE mg/dL 1 87*   ANION GAP mmol/L 7   CALCIUM mg/dL 8 5   ALBUMIN g/dL 3 7   TOTAL BILIRUBIN mg/dL 0 39   ALK PHOS U/L 92   ALT U/L 49   AST U/L 92*   GLUCOSE RANDOM mg/dL 137         Results from last 7 days   Lab Units 07/19/22  1159 07/19/22  0742   POC GLUCOSE mg/dl 116 129               Imaging: Reviewed radiology reports from this admission including: chest xray  XR chest 1 view portable   ED Interpretation by Emiliano Jay MD (07/20 6711)   No acute intrathoracic pathology appreciated when compared to past radiograph  Final Result by Phyllis Yañez MD (39/03 1047)      No acute cardiopulmonary disease  Findings are stable            Workstation performed: [id number]         NM lung ventilation / perfusion    (Results Pending)       EKG and Other Studies Reviewed on Admission:   · EKG:NSR    ** Please Note: This note has been constructed using a voice recognition system   **

## 2022-07-20 NOTE — ED NOTES
Pt states she can't ambulate currently r/t pain and recent surgery on right foot  R  Toe fusion surgery  Per off going nurse pt o2 sat was initially in the high 80s prior to oxygen administration        Aggie Stallworth RN  07/20/22 3885

## 2022-07-20 NOTE — ASSESSMENT & PLAN NOTE
· Patient is postop day 1 status post fusion right foot yesterday    Continue nonweightbearing to right lower extremity and use of walker  · Continue pain medication regimen  · Outpatient Podiatry follow-up

## 2022-07-20 NOTE — ED NOTES
Pt injected with isotopes by nuc med at bedside, pt then taken to nuc med scan  Remdesivir still infusing, pt difficult stick  Will start heparin when pt returns to room 15        Tay You, JAYSHREE  07/20/22 6123

## 2022-07-20 NOTE — PROGRESS NOTES
Post procedure call completed    Bleeding: _____yes __X___no (pt denies)    Pain: _____yes ___X___no (pt denies)    Redness/Swelling: ______yes ___X___no (pt denies)    Band aid removed: __X___yes _____no    Steri-Strips intact: ___X___yes _____no (discussed with patient to remove steri strips on Saturday if they have not come off on their own)    Pt with no questions at this time, adv will call when results available, adv to call with any questions or concerns, has name/# for contact    Pt states she had her surgery yesterday but is currently in the ER, pt states she was having breathing issues and went to the ER, she tested positive for COVID, pt states she is going to be admitted

## 2022-07-20 NOTE — ED ATTENDING ATTESTATION
7/20/2022  I, Rommel Brooks MD, saw and evaluated the patient  I have discussed the patient with the resident/non-physician practitioner and agree with the resident's/non-physician practitioner's findings, Plan of Care, and MDM as documented in the resident's/non-physician practitioner's note, except where noted  All available labs and Radiology studies were reviewed  I was present for key portions of any procedure(s) performed by the resident/non-physician practitioner and I was immediately available to provide assistance  At this point I agree with the current assessment done in the Emergency Department  I have conducted an independent evaluation of this patient a history and physical is as follows:    ED Course         Critical Care Time  Procedures    Patient presents with hypoxia  Noted to have a procedure done recently  No pain  During my exam, had the patient sit up and move around on room air, no hypoxia, well appearing  MDM pleasant 67 yof, will workup for sob, patient thinks this may be simply 2/2 to opioids which is likely but will rule out pneumonia/pe vs other

## 2022-07-20 NOTE — ED PROVIDER NOTES
History  Chief Complaint   Patient presents with    Shortness of Breath     Patient stated that she had right foot surgery yesterday  She woke up last night with SOB and did a spot check on her pulse ox and was 82%  Patient still feels SOB  Patient taking percocet for pain taking it every 5-6 hours  Harden Solo comes to the ED via EMS after sudden onset of shortness of breath this morning after waking up  She states that she felt tired before going to sleep the previous evening (7/19)  She also states that she was recently discharged after a podiatry procedure the day before on her right foot (metatarsal fusion)  She states that she has been taking narcotic medication for pain control  She does state that she had taken a dosage prior to going to sleep  She states that she has had problems in the past with opioid medications and her toleration of their side effects  She denies any cough, congestion, nausea, vomiting, changes in vision, changes in hearing, lightheadedness, changes in urination, or changes in bowel movements  She denies any loss of consciousness  Upon initial evaluation this morning in the increased difficulty catching her breath, she took are pulse ox at home and was noted to be in the 80s  Secondary to her increased work of breathing and decrease in pulse oximetry reading, she reached out to EMS for transportation to the emergency department for continued evaluation of symptoms  After arrival in the emergency department, patient was noted to have an initial oxygen saturation in the high 80s  Oxygen saturation while in the emergency department was labile and was ranging between high 90s to low 80s depending on conversation verses rest with conscious deep respirations  Patient was placed on 2 L nasal cannula due to the labile nature of her oxygen readings while in the emergency department    Patient states that she has no home oxygen      History provided by:  Patient  Language  used: No    Shortness of Breath  Severity:  Moderate  Onset quality:  Sudden  Duration:  3 hours  Timing:  Constant  Progression:  Unchanged  Chronicity:  New  Context: not activity, not animal exposure, not emotional upset, not fumes, not known allergens, not occupational exposure, not pollens, not smoke exposure, not strong odors, not URI and not weather changes    Relieved by:  Nothing  Worsened by:  Nothing  Ineffective treatments:  None tried  Associated symptoms: no abdominal pain, no chest pain, no claudication, no cough, no diaphoresis, no ear pain, no fever, no headaches, no hemoptysis, no neck pain, no PND, no rash, no sore throat, no sputum production, no syncope, no swollen glands, no vomiting and no wheezing        Prior to Admission Medications   Prescriptions Last Dose Informant Patient Reported? Taking? B-D ULTRAFINE III SHORT PEN 31G X 8 MM MISC  Self Yes No   Sig: USE AS DIRECTED 4 TIMES PER DAY   DULoxetine HCl (CYMBALTA PO)  Self Yes No   Sig: Take 90 mg by mouth in the morning   Krill Oil (Omega-3) 500 MG CAPS  Self Yes No   Sig: Take 1,000 mg by mouth 2 (two) times a day   Probiotic Product (PROBIOTIC PO)  Self Yes No   Sig: Take 1 capsule by mouth 2 (two) times a day   Vitamin D, Ergocalciferol, 2000 units CAPS  Self Yes No   Sig: Take 2,000 Units by mouth daily    acetaminophen (TYLENOL) 325 mg tablet  Self No No   Sig: Take 2 tablets (650 mg total) by mouth every 6 (six) hours as needed for mild pain, headaches or fever   albuterol (Ventolin HFA) 90 mcg/act inhaler  Self No No   Sig: Inhale 2 puffs every 6 (six) hours as needed for wheezing or shortness of breath   amLODIPine (NORVASC) 5 mg tablet   No No   Sig: TAKE 1 TABLET (5 MG TOTAL) BY MOUTH DAILY     famotidine (PEPCID) 10 mg tablet  Self Yes No   Sig: Take 10 mg by mouth daily at bedtime   fluticasone (FLONASE) 50 mcg/act nasal spray   No No   Si-2 sprays into each nostril daily   Patient taking differently: 1-2 sprays into each nostril if needed   gabapentin (NEURONTIN) 100 mg capsule  Self Yes No   Sig: Take 100 mg by mouth daily   insulin aspart (NovoLOG) 100 units/mL injection  Self Yes No   Sig: Inject under the skin 3 (three) times a day before meals 16 units, 16 units, 25 units  insulin detemir (Levemir FlexTouch) 100 Units/mL injection pen  Self Yes No   Sig: Inject 50 Units under the skin every evening    levothyroxine 112 mcg tablet  Self Yes No   Sig: Take 112 mcg by mouth every morning   metoprolol tartrate (LOPRESSOR) 50 mg tablet   No No   Sig: TAKE 1 TABLET (50 MG TOTAL) BY MOUTH EVERY 12 (TWELVE) HOURS   olmesartan (BENICAR) 20 mg tablet   No No   Sig: Take 0 5 tablets (10 mg total) by mouth 2 (two) times a day   oxyCODONE-acetaminophen (Percocet) 5-325 mg per tablet   No No   Sig: Take 1 tablet by mouth every 6 (six) hours as needed for moderate pain for up to 10 days Max Daily Amount: 4 tablets   pantoprazole (PROTONIX) 40 mg tablet   No No   Sig: Take 1 tablet (40 mg total) by mouth in the morning     pramipexole (MIRAPEX) 0 25 mg tablet  Self Yes No   Sig: Take 0 25 mg by mouth daily at bedtime   rosuvastatin (CRESTOR) 20 MG tablet  Self No No   Sig: Take 1 tablet (20 mg total) by mouth daily   torsemide (DEMADEX) 20 mg tablet  Self No No   Sig: TAKE 1 TABLET BY MOUTH EVERY DAY      Facility-Administered Medications: None       Past Medical History:   Diagnosis Date    Allergic     Anesthesia     pt reports "had to use double lumen endo tube for hiatal hernia repair/so surgeon could get to where he needed to work"    Arthritis     Aspiration into airway     Basal cell carcinoma 2007    left cheek     BCC (basal cell carcinoma) 05/27/2021    Left Nasal tip    Cancer [address] [address] )     squamous cell cancer on forhead    Cancer ([facility] [address] )     basal cell on nose     Chronic kidney disease 2000, 2018    Stones, kidney disease stage 4    Chronic pain disorder     bilat feet and joint pain on occas    Colon polyp  Constipation     COPD [facility]     COVID-19 08/2021    recovered at home/did receive monoclonal infusion    Dental bridge present     Depression     Diabetes mellitus ([place] [address] )     Type 2    Diabetic neuropathy ([place] [address] )     Disease of thyroid gland     Family history of thyroid problem     Fatty liver     GERD (gastroesophageal reflux disease)     Heart burn     Hiatal hernia     History of pneumonia     Hyperlipidemia     Hyperplasia, parathyroid ([place] [address] )     Hypertension     Kidney problem     Kidney stone     Memory loss Julu2 2020    Motion sickness     Motion sickness     Neck pain     Obesity 1978    Obesity (BMI 30 0-34  9)     Pollen allergies     RLS (restless legs syndrome)     SCC (squamous cell carcinoma) 05/04/2021    left mid forehead    Seasonal allergies     Sleep apnea     has inspire    Squamous cell skin cancer 2007    left cheek     Swollen ankles     Urinary incontinence     Urinary tract infection 03/28/2022    Wears glasses        Past Surgical History:   Procedure Laterality Date    ARTHROSCOPY KNEE      BREAST BIOPSY      stereotactic-benign    BREAST BIOPSY      stereo-benign    BREAST EXCISIONAL BIOPSY      unknown date-benign    BREAST EXCISIONAL BIOPSY      unknown date-benign    BREAST EXCISIONAL BIOPSY      unknown date-benign    BREAST EXCISIONAL BIOPSY      unknown age-benign    CARDIAC CATHETERIZATION      CHOLECYSTECTOMY      COLONOSCOPY      EXAMINATION UNDER ANESTHESIA N/A 06/24/2021    Procedure: EXAM UNDER ANESTHESIA (EUA), DISE;  Surgeon: Bakari Michelle MD;  [facility] MAIN [facility];  Service: ENT    HERNIA REPAIR      HIATAL HERNIA REPAIR      KNEE SURGERY      Torn tracey lap surg    LIPOSUCTION      LYMPH NODE BIOPSY      MOHS SURGERY  05/20/2021    left mid forehead-Gautam    MOHS SURGERY Left 05/27/2021    Left nasal tip- gautam     CO FOOT/TOES SURGERY PROC UNLISTED Right 7/19/2022 Procedure: 1st metatarsal phalangeal joint fusion;  Surgeon: Armando Arshad DPM;  Location: [facility];  Service: Podiatry    SC INCISION IMPLANT CRANIAL NERVE STIM ELECTRODE/PULSE GEN N/A 11/10/2021    Procedure: INSERTION UPPER AIRWAY STIMULATOR, INSPIRE IMPLANT;  Surgeon: Crow Contreras MD;  Location: [facility];  Service: ENT    REDUCTION MAMMAPLASTY Bilateral 2000    REDUCTION MAMMAPLASTY      SKIN BIOPSY      SKIN CANCER EXCISION  2007    squamous cell carcinoma     SKIN CANCER EXCISION  2007    basal cell carcinoma    SQUAMOUS CELL CARCINOMA EXCISION      TOE SURGERY      TONSILLECTOMY      TUBAL LIGATION      UPPER GASTROINTESTINAL ENDOSCOPY      US GUIDED BREAST BIOPSY RIGHT COMPLETE Right 7/18/2022       Family History   Problem Relation Age of Onset    Heart disease Mother     Depression Mother     Hypertension Mother     COPD Mother     Hearing loss Mother     Anxiety disorder Mother     Heart disease Father     Lung cancer Father 79        Smoker     Cancer Father         brain    Alcohol abuse Father     Dementia Father     Hypertension Father     Thyroid disease Father     COPD Father     Arthritis Father     Brain cancer Father 76    Hypertension Sister     Diabetes Sister     Heart disease Sister     Thyroid disease Sister     Cancer Sister         Lympoma    Lung cancer Sister 78    Anxiety disorder Sister     Hypertension Brother     Diabetes Brother     Cancer Brother         Throat    Dementia Brother     Stroke Brother     Hypertension Brother     Heart disease Brother     Diabetes Brother     No Known Problems Son     No Known Problems Son     Brain cancer Paternal [de-identified]         unknown age   Saulo Pert Breast cancer Neg Hx      I have reviewed and agree with the history as documented      E-Cigarette/Vaping    E-Cigarette Use Never User      E-Cigarette/Vaping Substances    Nicotine No     THC No     CBD No     Flavoring No     Other No     Unknown No      Social History     Tobacco Use    Smoking status: Former Smoker     Packs/day: 2 00     Years: 10 00     Pack years: 20 00     Types: Cigarettes     Start date: 1/10/1968     Quit date: Al Christine     Years since quittin 11    Smokeless tobacco: Never Used    Tobacco comment: Smoked 2 pack a day   Vaping Use    Vaping Use: Never used   Substance Use Topics    Alcohol use: Yes     Comment: very rare - 2 or 3 a year    Drug use: No        Review of Systems   Constitutional: Negative for chills, diaphoresis and fever  HENT: Negative for ear pain and sore throat  Eyes: Negative for pain and visual disturbance  Respiratory: Positive for shortness of breath  Negative for cough, hemoptysis, sputum production and wheezing  Cardiovascular: Negative for chest pain, palpitations, claudication, syncope and PND  Gastrointestinal: Negative for abdominal pain and vomiting  Genitourinary: Negative for dysuria and hematuria  Musculoskeletal: Negative for arthralgias, back pain and neck pain  Skin: Negative for color change and rash  Neurological: Negative for seizures, syncope and headaches  All other systems reviewed and are negative  Physical Exam  ED Triage Vitals   Temperature Pulse Respirations Blood Pressure SpO2   22 0622 22 0622 22 0622 22 0622 22 0622   98 3 °F (36 8 °C) 73 16 128/61 95 %      Temp Source Heart Rate Source Patient Position - Orthostatic VS BP Location FiO2 (%)   22 1745 22 0622 22 0622 22 0622 --   Oral Monitor Sitting Right arm       Pain Score       22 1018       8             Orthostatic Vital Signs  Vitals:    22 1430 22 1608 22 1745 22 2055   BP:  137/63 133/68 119/62   Pulse: 68 64 69 81   Patient Position - Orthostatic VS:   Lying Lying       Physical Exam  Vitals and nursing note reviewed  Constitutional:       General: She is not in acute distress       Appearance: She is well-developed  HENT:      Head: Normocephalic and atraumatic  Eyes:      Conjunctiva/sclera: Conjunctivae normal       Pupils: Pupils are equal, round, and reactive to light  Cardiovascular:      Rate and Rhythm: Normal rate and regular rhythm  Heart sounds: No murmur heard  Pulmonary:      Effort: Tachypnea present  No respiratory distress  Breath sounds: Normal breath sounds  No decreased breath sounds or wheezing  Abdominal:      Palpations: Abdomen is soft  Tenderness: There is no abdominal tenderness  Musculoskeletal:         General: Normal range of motion  Cervical back: Normal range of motion and neck supple  Right lower leg: No tenderness  No edema  Left lower leg: No tenderness  No edema  Comments: Bandage noted over the right lower extremity consistent with procedure done the previous day with podiatry  Skin:     General: Skin is warm and dry  Capillary Refill: Capillary refill takes less than 2 seconds  Neurological:      General: No focal deficit present  Mental Status: She is alert and oriented to person, place, and time     Psychiatric:         Mood and Affect: Mood normal          ED Medications  Medications   oxyCODONE (ROXICODONE) IR tablet 2 5 mg (2 5 mg Oral Given 7/21/22 0226)   oxyCODONE (ROXICODONE) IR tablet 5 mg ( Oral Canceled Entry 7/21/22 0226)   albuterol (PROVENTIL HFA,VENTOLIN HFA) inhaler 2 puff (has no administration in time range)   acetaminophen (TYLENOL) tablet 650 mg ( Oral Canceled Entry 7/21/22 0226)   amLODIPine (NORVASC) tablet 5 mg (5 mg Oral Not Given 7/20/22 1245)   DULoxetine (CYMBALTA) delayed release capsule 90 mg (90 mg Oral Not Given 7/20/22 1245)   famotidine (PEPCID) tablet 10 mg (10 mg Oral Given 7/20/22 2125)   gabapentin (NEURONTIN) capsule 100 mg (100 mg Oral Not Given 7/20/22 1246)   levothyroxine tablet 112 mcg (112 mcg Oral Given 7/21/22 0602)   metoprolol tartrate (LOPRESSOR) tablet 50 mg (50 mg Oral Given 7/20/22 2125)   losartan (COZAAR) tablet 50 mg (50 mg Oral Not Given 7/20/22 1246)   pantoprazole (PROTONIX) EC tablet 40 mg (40 mg Oral Given 7/21/22 0602)   pramipexole (MIRAPEX) tablet 0 25 mg (0 25 mg Oral Given 7/20/22 2125)   saccharomyces boulardii (FLORASTOR) capsule 250 mg (250 mg Oral Given 7/20/22 2125)   atorvastatin (LIPITOR) tablet 40 mg (40 mg Oral Given 7/20/22 1813)   torsemide (DEMADEX) tablet 20 mg (20 mg Oral Not Given 7/20/22 1247)   insulin lispro (HumaLOG) 100 units/mL subcutaneous injection 1-6 Units (2 Units Subcutaneous Given 7/20/22 1853)   insulin lispro (HumaLOG) 100 units/mL subcutaneous injection 16 Units (has no administration in time range)   insulin lispro (HumaLOG) 100 units/mL subcutaneous injection 16 Units (16 Units Subcutaneous Not Given 7/20/22 1343)   insulin lispro (HumaLOG) 100 units/mL subcutaneous injection 25 Units (25 Units Subcutaneous Given 7/20/22 1852)   dexamethasone (DECADRON) injection 6 mg (6 mg Intravenous Given 7/20/22 1349)   remdesivir (Veklury) 200 mg in sodium chloride 0 9 % 290 mL IVPB (200 mg Intravenous Given 7/20/22 1356)     Followed by   remdesivir Darlene Sandhoff) 100 mg in sodium chloride 0 9 % 270 mL IVPB (has no administration in time range)   fluticasone (FLONASE) 50 mcg/act nasal spray 2 spray (2 sprays Nasal Given 7/20/22 1349)   guaiFENesin (MUCINEX) 12 hr tablet 600 mg (600 mg Oral Given 7/20/22 2125)   heparin (porcine) 25,000 units in 0 45% NaCl 250 mL infusion (premix) (12 Units/kg/hr × 75 kg (Order-Specific) Intravenous Rate/Dose Verify 7/21/22 0600)   heparin (porcine) injection 4,000 Units (has no administration in time range)   heparin (porcine) injection 2,000 Units (has no administration in time range)   insulin glargine (LANTUS) subcutaneous injection 50 Units 0 5 mL (50 Units Subcutaneous Given 7/20/22 6897)   fish oil capsule 1,000 mg (1,000 mg Oral Given 7/20/22 7583)   cholecalciferol (VITAMIN D3) tablet 2,000 Units (2,000 Units Oral Given 7/20/22 1350)   insulin lispro (HumaLOG) 100 units/mL subcutaneous injection 1-5 Units (2 Units Subcutaneous Given 7/21/22 0244)   acetaminophen (TYLENOL) tablet 650 mg (650 mg Oral Given 7/20/22 1040)   heparin (porcine) injection 4,000 Units (4,000 Units Intravenous Given 7/20/22 1550)       Diagnostic Studies  Results Reviewed     Procedure Component Value Units Date/Time    Procalcitonin [[id number]]  (Normal) Collected: 07/21/22 0508    Lab Status: Final result Specimen: Blood from Arm, Left Updated: 07/21/22 0613     Procalcitonin 0 08 ng/ml     Comprehensive metabolic panel [[id number]]  (Abnormal) Collected: 07/21/22 0508    Lab Status: Final result Specimen: Blood from Arm, Left Updated: 07/21/22 0553     Sodium 134 mmol/L      Potassium 4 4 mmol/L      Chloride 103 mmol/L      CO2 25 mmol/L      ANION GAP 6 mmol/L      BUN 33 mg/dL      Creatinine 1 71 mg/dL      Glucose 250 mg/dL      Calcium 8 5 mg/dL      Corrected Calcium 9 0 mg/dL      AST 38 U/L      ALT 33 U/L      Alkaline Phosphatase 86 U/L      Total Protein 5 7 g/dL      Albumin 3 4 g/dL      Total Bilirubin 0 27 mg/dL      eGFR 29 ml/min/1 73sq m     Narrative:      [place] guidelines for Chronic Kidney Disease (CKD):     Stage 1 with normal or high GFR (GFR > 90 mL/min/1 73 square meters)    Stage 2 Mild CKD (GFR = 60-89 mL/min/1 73 square meters)    Stage 3A Moderate CKD (GFR = 45-59 mL/min/1 73 square meters)    Stage 3B Moderate CKD (GFR = 30-44 mL/min/1 73 square meters)    Stage 4 Severe CKD (GFR = 15-29 mL/min/1 73 square meters)    Stage 5 End Stage CKD (GFR <15 mL/min/1 73 square meters)  Note: GFR calculation is accurate only with a steady state creatinine    C-reactive protein [[id number]]  (Abnormal) Collected: 07/21/22 0508    Lab Status: Final result Specimen: Blood from Arm, Left Updated: 07/21/22 0553     CRP 25 3 mg/L     CBC and differential [[id number]]  (Abnormal) Collected: 07/21/22 0508    Lab Status: Final result Specimen: Blood from Arm, Left Updated: 07/21/22 0521     WBC 14 72 Thousand/uL      RBC 3 70 Million/uL      Hemoglobin 10 6 g/dL      Hematocrit 33 2 %      MCV 90 fL      MCH 28 6 pg      MCHC 31 9 g/dL      RDW 13 9 %      MPV 11 9 fL      Platelets 829 Thousands/uL      nRBC 0 /100 WBCs      Neutrophils Relative 89 %      Immat GRANS % 1 %      Lymphocytes Relative 7 %      Monocytes Relative 3 %      Eosinophils Relative 0 %      Basophils Relative 0 %      Neutrophils Absolute 13 13 Thousands/µL      Immature Grans Absolute 0 11 Thousand/uL      Lymphocytes Absolute 1 03 Thousands/µL      Monocytes Absolute 0 43 Thousand/µL      Eosinophils Absolute 0 00 Thousand/µL      Basophils Absolute 0 02 Thousands/µL     Fingerstick Glucose (POCT) [[id number]]  (Normal) Collected: 07/20/22 1338    Lab Status: Final result Updated: 07/20/22 1340     POC Glucose 90 mg/dl     APTT [[id number]]  (Normal) Collected: 07/20/22 1235    Lab Status: Final result Specimen: Blood from Arm, Left Updated: 07/20/22 1307     PTT 26 seconds     Protime-INR [[id number]]  (Normal) Collected: 07/20/22 0734    Lab Status: Final result Specimen: Blood from Arm, Right Updated: 07/20/22 1252     Protime 12 4 seconds      INR 0 93    D-dimer, quantitative [[id number]]  (Abnormal) Collected: 07/20/22 0734    Lab Status: Final result Specimen: Blood from Arm, Right Updated: 07/20/22 0907     D-Dimer, Quant 0 58 ug/ml FEU     Narrative: In the evaluation for possible pulmonary embolism, in the appropriate (Well's Score of 4 or less) patient, the age adjusted d-dimer cutoff for this patient can be calculated as:    Age x 0 01 (in ug/mL) for Age-adjusted D-dimer exclusion threshold for a patient over 50 years      FLU/RSV/COVID - if FLU/RSV clinically relevant [[id number]]  (Abnormal) Collected: 07/20/22 0646    Lab Status: Final result Specimen: Nares from Nose Updated: 07/20/22 0740     SARS-CoV-2 Positive INFLUENZA A PCR Negative     INFLUENZA B PCR Negative     RSV PCR Negative    Narrative:      FOR PEDIATRIC PATIENTS - copy/paste COVID Guidelines URL to browser: [email or web address]    SARS-CoV-2 assay is a Nucleic Acid Amplification assay intended for the  qualitative detection of nucleic acid from SARS-CoV-2 in nasopharyngeal  swabs  Results are for the presumptive identification of SARS-CoV-2 RNA  Positive results are indicative of infection with SARS-CoV-2, the virus  causing COVID-19, but do not rule out bacterial infection or co-infection  with other viruses  Laboratories within the [place] and its  territories are required to report all positive results to the appropriate  public health authorities  Negative results do not preclude SARS-CoV-2  infection and should not be used as the sole basis for treatment or other  patient management decisions  Negative results must be combined with  clinical observations, patient history, and epidemiological information  This test has not been FDA cleared or approved  This test has been authorized by FDA under an Emergency Use Authorization  (EUA)  This test is only authorized for the duration of time the  declaration that circumstances exist justifying the authorization of the  emergency use of an in vitro diagnostic tests for detection of SARS-CoV-2  virus and/or diagnosis of COVID-19 infection under section 564(b)(1) of  the Act, 21 U  S C  198YUA-0(I)(6), unless the authorization is terminated  or revoked sooner  The test has been validated but independent review by FDA  and CLIA is pending  Test performed using [employer] Gene[employer]: This RT-PCR assay targets N2,  a region unique to SARS-CoV-2  A conserved region in the E-gene was chosen  for pan-Sarbecovirus detection which includes SARS-CoV-2      Comprehensive metabolic panel [[id number]]  (Abnormal) Collected: 07/20/22 0646    Lab Status: Final result Specimen: Blood from Arm, Left Updated: 07/20/22 0727     Sodium 138 mmol/L      Potassium 5 3 mmol/L      Chloride 104 mmol/L      CO2 27 mmol/L      ANION GAP 7 mmol/L      BUN 31 mg/dL      Creatinine 1 87 mg/dL      Glucose 137 mg/dL      Calcium 8 5 mg/dL      AST 92 U/L      ALT 49 U/L      Alkaline Phosphatase 92 U/L      Total Protein 6 6 g/dL      Albumin 3 7 g/dL      Total Bilirubin 0 39 mg/dL      eGFR 26 ml/min/1 73sq m     Narrative:      National Kidney Disease Foundation guidelines for Chronic Kidney Disease (CKD):     Stage 1 with normal or high GFR (GFR > 90 mL/min/1 73 square meters)    Stage 2 Mild CKD (GFR = 60-89 mL/min/1 73 square meters)    Stage 3A Moderate CKD (GFR = 45-59 mL/min/1 73 square meters)    Stage 3B Moderate CKD (GFR = 30-44 mL/min/1 73 square meters)    Stage 4 Severe CKD (GFR = 15-29 mL/min/1 73 square meters)    Stage 5 End Stage CKD (GFR <15 mL/min/1 73 square meters)  Note: GFR calculation is accurate only with a steady state creatinine    Blood gas, venous [[id number]]  (Abnormal) Collected: 07/20/22 0646    Lab Status: Final result Specimen: Blood from Arm, Left Updated: 07/20/22 0711     pH, Tato 7 301     pCO2, Tato 49 7 mm Hg      pO2, Tato 48 1 mm Hg      HCO3, Tato 24 0 mmol/L      Base Excess, Tato -2 8 mmol/L      O2 Content, Tato 13 6 ml/dL      O2 HGB, VENOUS 78 0 %     CBC and differential [[id number]] Collected: 07/20/22 0646    Lab Status: Final result Specimen: Blood from Arm, Left Updated: 07/20/22 0659     WBC 9 91 Thousand/uL      RBC 4 12 Million/uL      Hemoglobin 12 0 g/dL      Hematocrit 38 1 %      MCV 93 fL      MCH 29 1 pg      MCHC 31 5 g/dL      RDW 14 3 %      MPV 12 1 fL      Platelets 038 Thousands/uL      nRBC 0 /100 WBCs      Neutrophils Relative 69 %      Immat GRANS % 1 %      Lymphocytes Relative 20 %      Monocytes Relative 7 %      Eosinophils Relative 3 %      Basophils Relative 0 %      Neutrophils Absolute 6 97 Thousands/µL Immature Grans Absolute 0 06 Thousand/uL      Lymphocytes Absolute 1 93 Thousands/µL      Monocytes Absolute 0 68 Thousand/µL      Eosinophils Absolute 0 26 Thousand/µL      Basophils Absolute 0 01 Thousands/µL                  NM lung perfusion imaging (particulate)   Final Result by Jordon Perez MD (07/20 6676)      Low probability of pulmonary embolism  Workstation performed: [id number]         XR chest 1 view portable   ED Interpretation by Neal Pimentel MD (07/20 7693)   No acute intrathoracic pathology appreciated when compared to past radiograph  Final Result by Bal Grajeda MD (47/43 9740)      No acute cardiopulmonary disease        Findings are stable            Workstation performed: [id number]               Procedures  ECG 12 Lead Documentation Only    Date/Time: 7/20/2022 8:57 AM  Performed by: Neal Pimentel MD  Authorized by: Neal Pimentel MD     Patient location:  ED  Previous ECG:     Previous ECG:  Compared to current    Similarity:  Changes noted    Comparison to cardiac monitor: Yes    Interpretation:     Interpretation: abnormal    Quality:     Tracing quality:  Limited by artifact  Rate:     ECG rate:  67    ECG rate assessment: normal    Rhythm:     Rhythm: sinus rhythm and A-V block    Ectopy:     Ectopy: none    QRS:     QRS axis:  Normal    QRS intervals:  Normal  Conduction:     Conduction: abnormal      Abnormal conduction: 1st degree    ST segments:     ST segments:  Normal  T waves:     T waves: normal            ED Course                                 Wells' Criteria for PE    Flowsheet Row Most Recent Value   Wells' Criteria for PE    Clinical signs and symptoms of DVT 0 Filed at: 07/20/2022 0911   PE is primary diagnosis or equally likely 0 Filed at: 07/20/2022 0911   HR >100 0 Filed at: 07/20/2022 0911   Immobilization at least 3 days or Surgery in the previous 4 weeks 1 5 Filed at: 07/20/2022 0911   Previous, objectively diagnosed PE or DVT 0 Filed at: 07/20/2022 0911   Hemoptysis 0 Filed at: 07/20/2022 5920   Malignancy with treatment within 6 months or palliative 0 Filed at: 07/20/2022 0517   Wells' Criteria Total 1 5 Filed at: 07/20/2022 0911            MDM  Number of Diagnoses or Management Options  COVID-19: new and requires workup  Hypoxia: new and requires workup  SOB (shortness of breath): new and requires workup  Diagnosis management comments: Rebecca Alonso comes to the emergency department after sudden onset of shortness of breath this morning after awakening  Patient had a surgical procedure the day before  Patient has been taking narcotic medication and states that she has had adverse reactions to them in the past     Based off of labile oxygen saturation readings in the emergency department, ablation was placed on 2 L nasal cannula supplemental oxygen  Patient has been trialed off of the nasal cannula supplementation and has been tolerating adequate oxygenation is a in the low 90s when at rest     Based off initial presenting complaints, initial laboratory studies were conducted:  CBC, CMP were unremarkable  Patient noted to have a decreased GFR with increasing BUN and creatinine  ECG was noted to be unremarkable with 1st degree AV block  No acute ischemic changes or arrhythmias appreciated  Viral upper respiratory panel came back positive for COVID-19  Patient is noted to be vaccinated x2 with a booster shot  In the setting of her recent surgical procedure, sudden onset of shortness of breath, and positive COVID-19 screen, D-dimer was sent for evaluation of potential PE   D-dimer was mildly elevated  Secondary to poor GFR and potential for contrast induced kidney injury, V/Q scan was ordered for continued evaluation of the patient for PE  Patient was unable to be ambulated in the emergency department secondary to the bandaging and structure of the right lower leg following her surgical procedure  Patient was noted to be calming hypoxic to the high 80s while at rest and sitting in the stretcher  Patient stills feels subjectively short of breath  Based off of persistence of symptoms and oxygen saturation values in the setting of the COVID-19 positive screen, it was decided the patient would be beneficial for the patient to pursue inpatient admission for continued evaluation of symptoms  This case was discussed with the inpatient medical team and it was decided    Disposition: Inpatient admission for continued evaluation and management of hypoxia in setting of Covid-19 infection as per hospital team          Amount and/or Complexity of Data Reviewed  Clinical lab tests: ordered and reviewed  Tests in the radiology section of CPT®: ordered and reviewed  Obtain history from someone other than the patient: yes  Review and summarize past medical records: yes  Discuss the patient with other providers: yes  Independent visualization of images, tracings, or specimens: yes    Risk of Complications, Morbidity, and/or Mortality  Presenting problems: moderate  Diagnostic procedures: moderate  Management options: moderate    Patient Progress  Patient progress: stable      Disposition  Final diagnoses:   Hypoxia   SOB (shortness of breath)   COVID-19     Time reflects when diagnosis was documented in both MDM as applicable and the Disposition within this note     Time User Action Codes Description Comment    7/20/2022  9:08 AM Eliana Venice Add [R09 02] Hypoxia     7/20/2022  9:08 AM Eliana Venice Add [R06 02] SOB (shortness of breath)     7/20/2022  9:08 AM Eliana Venice Add [U07 1] COVID-19       ED Disposition     ED Disposition   Admit    Condition   Stable    Date/Time   Wed Jul 20, 2022 10:30 AM    Comment   Case was discussed with Dr Nubia Cano and the patient's admission status was agreed to be Admission Status: inpatient status to the service of Dr Nubia Cano              Follow-up Information None         Current Discharge Medication List      CONTINUE these medications which have NOT CHANGED    Details   acetaminophen (TYLENOL) 325 mg tablet Take 2 tablets (650 mg total) by mouth every 6 (six) hours as needed for mild pain, headaches or fever  Refills: 0    Associated Diagnoses: UTI (urinary tract infection)      albuterol (Ventolin HFA) 90 mcg/act inhaler Inhale 2 puffs every 6 (six) hours as needed for wheezing or shortness of breath  Refills: 0    Comments: Substitution to a formulary equivalent within the same pharmaceutical class is authorized  Associated Diagnoses: Acute respiratory failure with hypoxia (HCC)      amLODIPine (NORVASC) 5 mg tablet TAKE 1 TABLET (5 MG TOTAL) BY MOUTH DAILY  Qty: 90 tablet, Refills: 0    Associated Diagnoses: Hypertensive chronic kidney disease with stage 1 through stage 4 chronic kidney disease, or unspecified chronic kidney disease; Essential hypertension; Chronic kidney disease, stage III (moderate) (HCC)      B-D ULTRAFINE III SHORT PEN 31G X 8 MM MISC USE AS DIRECTED 4 TIMES PER DAY  Refills: 3      DULoxetine HCl (CYMBALTA PO) Take 90 mg by mouth in the morning      famotidine (PEPCID) 10 mg tablet Take 10 mg by mouth daily at bedtime      fluticasone (FLONASE) 50 mcg/act nasal spray 1-2 sprays into each nostril daily  Qty: 18 2 mL, Refills: 5    Associated Diagnoses: Seasonal allergies      gabapentin (NEURONTIN) 100 mg capsule Take 100 mg by mouth daily      insulin aspart (NovoLOG) 100 units/mL injection Inject under the skin 3 (three) times a day before meals 16 units, 16 units, 25 units        insulin detemir (Levemir FlexTouch) 100 Units/mL injection pen Inject 50 Units under the skin every evening       Krill Oil (Omega-3) 500 MG CAPS Take 1,000 mg by mouth 2 (two) times a day      levothyroxine 112 mcg tablet Take 112 mcg by mouth every morning      metoprolol tartrate (LOPRESSOR) 50 mg tablet TAKE 1 TABLET (50 MG TOTAL) BY MOUTH EVERY 12 (TWELVE) HOURS  Qty: 180 tablet, Refills: 3    Associated Diagnoses: Essential hypertension      olmesartan (BENICAR) 20 mg tablet Take 0 5 tablets (10 mg total) by mouth 2 (two) times a day  Qty: 90 tablet, Refills: 3    Comments: NOTE CHANGE IN DOSE  Associated Diagnoses: Chronic kidney disease (CKD) stage G3a/A1, moderately decreased glomerular filtration rate (GFR) between 45-59 mL/min/1 73 square meter and albuminuria creatinine ratio less than 30 mg/g ([facility] 75 ); Hypertensive chronic kidney disease with stage 1 through stage 4 chronic kidney disease, or unspecified chronic kidney disease      oxyCODONE-acetaminophen (Percocet) 5-325 mg per tablet Take 1 tablet by mouth every 6 (six) hours as needed for moderate pain for up to 10 days Max Daily Amount: 4 tablets  Qty: 20 tablet, Refills: 0    Associated Diagnoses: Post-operative state      pantoprazole (PROTONIX) 40 mg tablet Take 1 tablet (40 mg total) by mouth in the morning  Qty: 90 tablet, Refills: 0    Associated Diagnoses: Gastroesophageal reflux disease, unspecified whether esophagitis present      pramipexole (MIRAPEX) 0 25 mg tablet Take 0 25 mg by mouth daily at bedtime      Probiotic Product (PROBIOTIC PO) Take 1 capsule by mouth 2 (two) times a day      rosuvastatin (CRESTOR) 20 MG tablet Take 1 tablet (20 mg total) by mouth daily  Qty: 30 tablet, Refills: 0    Associated Diagnoses: Dyslipidemia      torsemide (DEMADEX) 20 mg tablet TAKE 1 TABLET BY MOUTH EVERY DAY  Qty: 90 tablet, Refills: 3    Associated Diagnoses: Hypertensive chronic kidney disease with stage 1 through stage 4 chronic kidney disease, or unspecified chronic kidney disease; CKD (chronic kidney disease) stage 3, GFR 30-59 ml/min ([facility])      Vitamin D, Ergocalciferol, 2000 units CAPS Take 2,000 Units by mouth daily            No discharge procedures on file      PDMP Review       Value Time User    PDMP Reviewed  Yes 11/10/2021  3:47 PM Lucrecia De Jesus MD           ED Provider  Attending physically available and evaluated Dafne Smith I managed the patient along with the ED Attending      Electronically Signed by         Liz Skaggs MD  07/21/22 6835

## 2022-07-21 ENCOUNTER — TELEPHONE (OUTPATIENT)
Dept: NEPHROLOGY | Facility: CLINIC | Age: 72
End: 2022-07-21

## 2022-07-21 ENCOUNTER — APPOINTMENT (INPATIENT)
Dept: RADIOLOGY | Facility: HOSPITAL | Age: 72
DRG: 177 | End: 2022-07-21
Payer: MEDICARE

## 2022-07-21 ENCOUNTER — TELEPHONE (OUTPATIENT)
Dept: MAMMOGRAPHY | Facility: CLINIC | Age: 72
End: 2022-07-21

## 2022-07-21 LAB
ALBUMIN SERPL BCP-MCNC: 3.4 G/DL (ref 3.5–5)
ALP SERPL-CCNC: 86 U/L (ref 34–104)
ALT SERPL W P-5'-P-CCNC: 33 U/L (ref 7–52)
ANION GAP SERPL CALCULATED.3IONS-SCNC: 6 MMOL/L (ref 4–13)
APTT PPP: 88 SECONDS (ref 23–37)
AST SERPL W P-5'-P-CCNC: 38 U/L (ref 13–39)
BASOPHILS # BLD AUTO: 0.02 THOUSANDS/ΜL (ref 0–0.1)
BASOPHILS NFR BLD AUTO: 0 % (ref 0–1)
BILIRUB SERPL-MCNC: 0.27 MG/DL (ref 0.2–1)
BUN SERPL-MCNC: 33 MG/DL (ref 5–25)
CALCIUM ALBUM COR SERPL-MCNC: 9 MG/DL (ref 8.3–10.1)
CALCIUM SERPL-MCNC: 8.5 MG/DL (ref 8.4–10.2)
CHLORIDE SERPL-SCNC: 103 MMOL/L (ref 96–108)
CO2 SERPL-SCNC: 25 MMOL/L (ref 21–32)
CREAT SERPL-MCNC: 1.71 MG/DL (ref 0.6–1.3)
CRP SERPL QL: 25.3 MG/L
EOSINOPHIL # BLD AUTO: 0 THOUSAND/ΜL (ref 0–0.61)
EOSINOPHIL NFR BLD AUTO: 0 % (ref 0–6)
ERYTHROCYTE [DISTWIDTH] IN BLOOD BY AUTOMATED COUNT: 13.9 % (ref 11.6–15.1)
GFR SERPL CREATININE-BSD FRML MDRD: 29 ML/MIN/1.73SQ M
GLUCOSE SERPL-MCNC: 124 MG/DL (ref 65–140)
GLUCOSE SERPL-MCNC: 167 MG/DL (ref 65–140)
GLUCOSE SERPL-MCNC: 215 MG/DL (ref 65–140)
GLUCOSE SERPL-MCNC: 226 MG/DL (ref 65–140)
GLUCOSE SERPL-MCNC: 240 MG/DL (ref 65–140)
GLUCOSE SERPL-MCNC: 250 MG/DL (ref 65–140)
GLUCOSE SERPL-MCNC: 251 MG/DL (ref 65–140)
HCT VFR BLD AUTO: 33.2 % (ref 34.8–46.1)
HGB BLD-MCNC: 10.6 G/DL (ref 11.5–15.4)
IMM GRANULOCYTES # BLD AUTO: 0.11 THOUSAND/UL (ref 0–0.2)
IMM GRANULOCYTES NFR BLD AUTO: 1 % (ref 0–2)
LYMPHOCYTES # BLD AUTO: 1.03 THOUSANDS/ΜL (ref 0.6–4.47)
LYMPHOCYTES NFR BLD AUTO: 7 % (ref 14–44)
MCH RBC QN AUTO: 28.6 PG (ref 26.8–34.3)
MCHC RBC AUTO-ENTMCNC: 31.9 G/DL (ref 31.4–37.4)
MCV RBC AUTO: 90 FL (ref 82–98)
MONOCYTES # BLD AUTO: 0.43 THOUSAND/ΜL (ref 0.17–1.22)
MONOCYTES NFR BLD AUTO: 3 % (ref 4–12)
NEUTROPHILS # BLD AUTO: 13.13 THOUSANDS/ΜL (ref 1.85–7.62)
NEUTS SEG NFR BLD AUTO: 89 % (ref 43–75)
NRBC BLD AUTO-RTO: 0 /100 WBCS
PLATELET # BLD AUTO: 207 THOUSANDS/UL (ref 149–390)
PMV BLD AUTO: 11.9 FL (ref 8.9–12.7)
POTASSIUM SERPL-SCNC: 4.4 MMOL/L (ref 3.5–5.3)
PROCALCITONIN SERPL-MCNC: 0.08 NG/ML
PROT SERPL-MCNC: 5.7 G/DL (ref 6.4–8.4)
RBC # BLD AUTO: 3.7 MILLION/UL (ref 3.81–5.12)
SODIUM SERPL-SCNC: 134 MMOL/L (ref 135–147)
WBC # BLD AUTO: 14.72 THOUSAND/UL (ref 4.31–10.16)

## 2022-07-21 PROCEDURE — 85730 THROMBOPLASTIN TIME PARTIAL: CPT | Performed by: INTERNAL MEDICINE

## 2022-07-21 PROCEDURE — 82948 REAGENT STRIP/BLOOD GLUCOSE: CPT

## 2022-07-21 PROCEDURE — 85025 COMPLETE CBC W/AUTO DIFF WBC: CPT | Performed by: PHYSICIAN ASSISTANT

## 2022-07-21 PROCEDURE — 80053 COMPREHEN METABOLIC PANEL: CPT | Performed by: PHYSICIAN ASSISTANT

## 2022-07-21 PROCEDURE — 94760 N-INVAS EAR/PLS OXIMETRY 1: CPT

## 2022-07-21 PROCEDURE — NC001 PR NO CHARGE: Performed by: PODIATRIST

## 2022-07-21 PROCEDURE — 86140 C-REACTIVE PROTEIN: CPT | Performed by: PHYSICIAN ASSISTANT

## 2022-07-21 PROCEDURE — 94664 DEMO&/EVAL PT USE INHALER: CPT

## 2022-07-21 PROCEDURE — 99232 SBSQ HOSP IP/OBS MODERATE 35: CPT | Performed by: PHYSICIAN ASSISTANT

## 2022-07-21 PROCEDURE — 73630 X-RAY EXAM OF FOOT: CPT

## 2022-07-21 PROCEDURE — 84145 PROCALCITONIN (PCT): CPT

## 2022-07-21 RX ORDER — INSULIN GLARGINE 100 [IU]/ML
7 INJECTION, SOLUTION SUBCUTANEOUS EVERY MORNING
Status: DISCONTINUED | OUTPATIENT
Start: 2022-07-21 | End: 2022-07-21

## 2022-07-21 RX ORDER — INSULIN LISPRO 100 [IU]/ML
1-5 INJECTION, SOLUTION INTRAVENOUS; SUBCUTANEOUS
Status: DISCONTINUED | OUTPATIENT
Start: 2022-07-21 | End: 2022-07-22 | Stop reason: HOSPADM

## 2022-07-21 RX ORDER — BENZONATATE 100 MG/1
100 CAPSULE ORAL 3 TIMES DAILY PRN
Status: DISCONTINUED | OUTPATIENT
Start: 2022-07-21 | End: 2022-07-22 | Stop reason: HOSPADM

## 2022-07-21 RX ADMIN — TORSEMIDE 20 MG: 20 TABLET ORAL at 08:41

## 2022-07-21 RX ADMIN — GUAIFENESIN 600 MG: 600 TABLET ORAL at 21:52

## 2022-07-21 RX ADMIN — INSULIN LISPRO 2 UNITS: 100 INJECTION, SOLUTION INTRAVENOUS; SUBCUTANEOUS at 02:44

## 2022-07-21 RX ADMIN — LEVOTHYROXINE SODIUM 112 MCG: 112 TABLET ORAL at 06:02

## 2022-07-21 RX ADMIN — METOPROLOL TARTRATE 50 MG: 50 TABLET, FILM COATED ORAL at 08:40

## 2022-07-21 RX ADMIN — OMEGA-3 FATTY ACIDS CAP 1000 MG 1000 MG: 1000 CAP at 08:41

## 2022-07-21 RX ADMIN — OXYCODONE HYDROCHLORIDE 2.5 MG: 5 TABLET ORAL at 02:26

## 2022-07-21 RX ADMIN — INSULIN LISPRO 25 UNITS: 100 INJECTION, SOLUTION INTRAVENOUS; SUBCUTANEOUS at 16:47

## 2022-07-21 RX ADMIN — AMLODIPINE BESYLATE 5 MG: 5 TABLET ORAL at 08:40

## 2022-07-21 RX ADMIN — ATORVASTATIN CALCIUM 40 MG: 40 TABLET, FILM COATED ORAL at 16:47

## 2022-07-21 RX ADMIN — HEPARIN SODIUM 12 UNITS/KG/HR: 10000 INJECTION, SOLUTION INTRAVENOUS at 21:54

## 2022-07-21 RX ADMIN — DULOXETINE HYDROCHLORIDE 90 MG: 60 CAPSULE, DELAYED RELEASE ORAL at 08:41

## 2022-07-21 RX ADMIN — INSULIN LISPRO 16 UNITS: 100 INJECTION, SOLUTION INTRAVENOUS; SUBCUTANEOUS at 12:50

## 2022-07-21 RX ADMIN — GUAIFENESIN 600 MG: 600 TABLET ORAL at 08:40

## 2022-07-21 RX ADMIN — PRAMIPEXOLE DIHYDROCHLORIDE 0.25 MG: 0.25 TABLET ORAL at 21:51

## 2022-07-21 RX ADMIN — LOSARTAN POTASSIUM 50 MG: 50 TABLET, FILM COATED ORAL at 08:41

## 2022-07-21 RX ADMIN — Medication 2000 UNITS: at 08:41

## 2022-07-21 RX ADMIN — GABAPENTIN 100 MG: 100 CAPSULE ORAL at 08:41

## 2022-07-21 RX ADMIN — Medication 250 MG: at 21:51

## 2022-07-21 RX ADMIN — DEXAMETHASONE SODIUM PHOSPHATE 6 MG: 4 INJECTION INTRA-ARTICULAR; INTRALESIONAL; INTRAMUSCULAR; INTRAVENOUS; SOFT TISSUE at 12:50

## 2022-07-21 RX ADMIN — REMDESIVIR 100 MG: 100 INJECTION, POWDER, LYOPHILIZED, FOR SOLUTION INTRAVENOUS at 12:44

## 2022-07-21 RX ADMIN — INSULIN GLARGINE 50 UNITS: 100 INJECTION, SOLUTION SUBCUTANEOUS at 21:52

## 2022-07-21 RX ADMIN — OXYCODONE HYDROCHLORIDE 2.5 MG: 5 TABLET ORAL at 23:52

## 2022-07-21 RX ADMIN — FAMOTIDINE 10 MG: 20 TABLET, FILM COATED ORAL at 21:51

## 2022-07-21 RX ADMIN — INSULIN LISPRO 3 UNITS: 100 INJECTION, SOLUTION INTRAVENOUS; SUBCUTANEOUS at 08:41

## 2022-07-21 RX ADMIN — OXYCODONE HYDROCHLORIDE 5 MG: 5 TABLET ORAL at 08:41

## 2022-07-21 RX ADMIN — ACETAMINOPHEN 650 MG: 325 TABLET ORAL at 16:47

## 2022-07-21 RX ADMIN — Medication 250 MG: at 08:40

## 2022-07-21 RX ADMIN — INSULIN LISPRO 1 UNITS: 100 INJECTION, SOLUTION INTRAVENOUS; SUBCUTANEOUS at 16:48

## 2022-07-21 RX ADMIN — INSULIN LISPRO 16 UNITS: 100 INJECTION, SOLUTION INTRAVENOUS; SUBCUTANEOUS at 08:41

## 2022-07-21 RX ADMIN — PANTOPRAZOLE SODIUM 40 MG: 40 TABLET, DELAYED RELEASE ORAL at 06:02

## 2022-07-21 RX ADMIN — METOPROLOL TARTRATE 50 MG: 50 TABLET, FILM COATED ORAL at 21:51

## 2022-07-21 NOTE — ASSESSMENT & PLAN NOTE
Lab Results   Component Value Date    HGBA1C 6 8 (H) 07/07/2022     Recent Labs     07/20/22  1743 07/20/22  2123 07/21/22  0220 07/21/22  0718   POCGLU 215* 206* 226* 240*     Blood Sugar Average: Last 72 hrs:  · (P) 195 4   · optimal blood glucose control history, follows with endocrinology, has CGM and at home takes levemir 50 units hs and mealtime insulin [id number]-[id number] u  · Monitor on Accu-Cheks with additional sliding scale coverage; given evidence of steroid induced hyperglycemia will also add some additional daytime Lantus

## 2022-07-21 NOTE — TELEPHONE ENCOUNTER
Pt called  Had her foot surgery and day after was admitted to the hospital with SOB and sat of 82%  Pt states she was diagnosed with COVID  She is feeling better but is still hospitalized  (wanted to make sure you knew as she was supposed to have repeat CBC ordered by Dr Jarvis Perez post surgery    Done while in the hospital)

## 2022-07-21 NOTE — ASSESSMENT & PLAN NOTE
· Patient with underlying history of COPD, treated sleep apnea and pulmonary hypertension who presents with sudden-onset shortness of breath in the middle of the night with confirmed hypoxia readings at home (82%) and also upon arrival to the ER   · Suspect this is due to COVID19  · V/Q scan low prob PE; noted mildly elevated D-dimer and recent surgery and no CTA done given CKD  · Continue supportive oxygen and wean off as able

## 2022-07-21 NOTE — CONSULTS
Consult - Podiatry   Lourdes Huang 67 y o  female MRN: [id number]  Unit/Bed#: S -01 Encounter: [id number]    Assessment/Plan     Assessment:  COVID pneumonia  Status post right 1st MTPJ fusion    Plan:  Patient was seen and evaluated at bedside today  Surgical site inspected  Appears to be coapting well with no signs of localized infection  Right foot redressed with Xeroform and dry sterile dressings with Ace wrap  Strict nonweightbearing to right foot in surgical shoe  Instructed to keep right lower extremity elevated on pillows while supine  Will follow-up with Dr Tyrone Spivey sole and clinic as instructed  Recommend x-rays of right foot to evaluate surgical hardware due to history of fall earlier today  No further podiatric intervention warranted  Will sign off at this time   Please re-consult as needed    History of Present Illness     HPI:  Lourdes Huang is a 67 y o  female who presents with symptoms of COVID pneumonia  Podiatry consulted for evaluation of pain and discomfort to recent surgical site on right foot  Of note patient slipped and fell while transferring from commode this morning  She denies any other insults to the surgical site  She has been compliant with rest and elevating right lower extremity  She endorses that pain is somewhat controlled with current pain regimen  Offers no other pedal complaints at this time  Consults  Review of Systems   Constitutional: Negative  HENT: Negative  Eyes: Negative  Respiratory: Negative  Cardiovascular: Negative  Gastrointestinal: Negative  Musculoskeletal:  Pain to right foot   Skin:  Negative   Neurological:  Negative      Psych: negative      Historical Information   Past Medical History:   Diagnosis Date    Allergic     Anesthesia     pt reports "had to use double lumen endo tube for hiatal hernia repair/so surgeon could get to where he needed to work"    Arthritis     Aspiration into airway     Basal cell carcinoma 2007 left cheek     BCC (basal cell carcinoma) 05/27/2021    Left Nasal tip    Cancer (HCC)     squamous cell cancer on forhead    Cancer (HCC)     basal cell on nose     Chronic kidney disease 2000, 2018    Stones, kidney disease stage 4    Chronic pain disorder     bilat feet and joint pain on occas    Colon polyp     Constipation     COPD [facility]chronic obstructive pulmonary disease) ([facility] [address] )     COVID-19 08/2021    recovered at home/did receive monoclonal infusion    Dental bridge present     Depression     [facility]     Type 2    Diabetic neuropathy (HCC)     Disease of thyroid gland     Family history of thyroid problem     Fatty liver     GERD (gastroesophageal reflux disease)     Heart burn     Hiatal hernia     History of pneumonia     Hyperlipidemia     Hyperplasia, parathyroid ([facility] )     Hypertension     Kidney problem     Kidney stone     Memory loss Julu2 2020    Motion sickness     Motion sickness     Neck pain     Obesity 1978    Obesity (BMI 30 0-34  9)     Pollen allergies     RLS (restless legs syndrome)     SCC (squamous cell carcinoma) 05/04/2021    left mid forehead    Seasonal allergies     Sleep apnea     has inspire    Squamous cell skin cancer 2007    left cheek     Swollen ankles     Urinary incontinence     Urinary tract infection 03/28/2022    Wears glasses      Past Surgical History:   Procedure Laterality Date    ARTHROSCOPY KNEE      BREAST BIOPSY      stereotactic-benign    BREAST BIOPSY      stereo-benign    BREAST EXCISIONAL BIOPSY      unknown date-benign    BREAST EXCISIONAL BIOPSY      unknown date-benign    BREAST EXCISIONAL BIOPSY      unknown date-benign    BREAST EXCISIONAL BIOPSY      unknown age-benign    CARDIAC CATHETERIZATION      CHOLECYSTECTOMY      COLONOSCOPY      EXAMINATION UNDER ANESTHESIA N/A 06/24/2021    Procedure: EXAM UNDER ANESTHESIA (EUA), DISE;  Surgeon: Angel Limon MD;  [facility]; Service: ENT    HERNIA REPAIR      HIATAL HERNIA REPAIR      KNEE SURGERY      Torn maniscus lap surg    LIPOSUCTION      LYMPH NODE BIOPSY      MOHS SURGERY  2021    left mid forehead-Gautam    MOHS SURGERY Left 2021    Left nasal tip- gautam     RI FOOT/TOES SURGERY PROC UNLISTED Right 2022    Procedure: 1st metatarsal phalangeal joint fusion;  Surgeon: Deneen Lopez DPM;  [facility];  Service: Podiatry    RI INCISION IMPLANT CRANIAL NERVE STIM ELECTRODE/PULSE GEN N/A 11/10/2021    Procedure: INSERTION UPPER AIRWAY STIMULATOR, INSPIRE IMPLANT;  Surgeon: Evans Rosen MD;  [facility];  Service: ENT    REDUCTION MAMMAPLASTY Bilateral 2000    REDUCTION MAMMAPLASTY      SKIN BIOPSY      SKIN CANCER EXCISION  2007    squamous cell carcinoma     SKIN CANCER EXCISION  2007    basal cell carcinoma    SQUAMOUS CELL CARCINOMA EXCISION      TOE SURGERY      TONSILLECTOMY      TUBAL LIGATION      UPPER GASTROINTESTINAL ENDOSCOPY      US GUIDED BREAST BIOPSY RIGHT COMPLETE Right 2022     Social History   Social History     Substance and Sexual Activity   Alcohol Use Yes    Comment: very rare - 2 or 3 a year     Social History     Substance and Sexual Activity   Drug Use No     Social History     Tobacco Use   Smoking Status Former Smoker    Packs/day: 2 00    Years: 10 00    Pack years: 20 00    Types: Cigarettes    Start date: 1/10/1968   Xavi Huston Quit date: 12    Years since quittin 5   Smokeless Tobacco Never Used   Tobacco Comment    Smoked 2 pack a day     Family History:   Family History   Problem Relation Age of Onset    Heart disease Mother     Depression Mother     Hypertension Mother     COPD Mother     Hearing loss Mother     Anxiety disorder Mother     Heart disease Father     Lung cancer Father 79        Smoker     Cancer Father         brain    Alcohol abuse Father     Dementia Father     Hypertension Father     Thyroid disease Father     COPD Father     Arthritis Father     Brain cancer Father 76    Hypertension Sister     Diabetes Sister     Heart disease Sister     Thyroid disease Sister     Cancer Sister         Lympoma    Lung cancer Sister 78    Anxiety disorder Sister     Hypertension Brother     Diabetes Brother     Cancer Brother         Throat    Dementia Brother     Stroke Brother     Hypertension Brother     Heart disease Brother     Diabetes Brother     No Known Problems Son     No Known Problems Son     Brain cancer Paternal Aunt         unknown age   [facility] Breast cancer Neg Hx        Meds/Allergies   Medications Prior to Admission   Medication    acetaminophen (TYLENOL) 325 mg tablet    albuterol (Ventolin HFA) 90 mcg/act inhaler    amLODIPine (NORVASC) 5 mg tablet    B-D ULTRAFINE III SHORT PEN 31G X 8 MM MISC    DULoxetine HCl (CYMBALTA PO)    famotidine (PEPCID) 10 mg tablet    fluticasone (FLONASE) 50 mcg/act nasal spray    gabapentin (NEURONTIN) 100 mg capsule    insulin aspart (NovoLOG) 100 units/mL injection    insulin detemir (Levemir FlexTouch) 100 Units/mL injection pen    Krill Oil (Omega-3) 500 MG CAPS    levothyroxine 112 mcg tablet    metoprolol tartrate (LOPRESSOR) 50 mg tablet    olmesartan (BENICAR) 20 mg tablet    oxyCODONE-acetaminophen (Percocet) 5-325 mg per tablet    pantoprazole (PROTONIX) 40 mg tablet    pramipexole (MIRAPEX) 0 25 mg tablet    Probiotic Product (PROBIOTIC PO)    rosuvastatin (CRESTOR) 20 MG tablet    torsemide (DEMADEX) 20 mg tablet    Vitamin D, Ergocalciferol, 2000 units CAPS     Allergies   Allergen Reactions    Sulfamethoxazole-Trimethoprim Other (See Comments)     Tongue swelling    Ciprofloxacin Hives and Itching       Objective   First Vitals:   Blood Pressure: 128/61 (07/20/22 0622)  Pulse: 73 (07/20/22 0622)  Temperature: 98 3 °F (36 8 °C) (07/20/22 0622)  Temp Source: Oral (07/20/22 0615)  Respirations: 16 (07/20/22 0622)  Height: 5' 3" (160 cm) (07/20/22 0622)  Weight - Scale: 75 8 kg (167 lb) (07/20/22 0622)  SpO2: 95 % (07/20/22 0622)    Current Vitals:   Blood Pressure: 120/62 (07/21/22 0700)  Pulse: 71 (07/21/22 0700)  Temperature: 97 6 °F (36 4 °C) (07/21/22 0700)  Temp Source: Oral (07/21/22 0700)  Respirations: 18 (07/21/22 0700)  Height: 5' 3" (160 cm) (07/20/22 0622)  Weight - Scale: 74 8 kg (165 lb) (07/20/22 1418)  SpO2: 94 % (07/21/22 1356)        /62 (BP Location: Right arm)   Pulse 71   Temp 97 6 °F (36 4 °C) (Oral)   Resp 18   Ht 5' 3" (1 6 m)   Wt 74 8 kg (165 lb)   SpO2 94%   BMI 29 23 kg/m²   Physical Exam:  General:    Alert, cooperative, no distress   Head:     Normocephalic, without obvious abnormality, atraumatic                   Skin:   Incision site to dorsum of right foot is coapted well with all sutures intact  No signs of local infection such as erythema blistering or ecchymosis  Lungs:     Respirations unlabored   Chest wall:    Not assessed   Heart/vasc:    not assessed   Abdomen:     Not assessed           Lower MSK:   No gross bony abnormalities noted    AROM intact to all digits of right foot   Psychiatric:  AAOx3, no depression           Neurologic:   Light touch intact to right foot     Lab Results:   Admission on 07/20/2022   Component Date Value    WBC 07/20/2022 9 91     RBC 07/20/2022 4 12     Hemoglobin 07/20/2022 12 0     Hematocrit 07/20/2022 38 1     MCV 07/20/2022 93     MCH 07/20/2022 29 1     MCHC 07/20/2022 31 5     RDW 07/20/2022 14 3     MPV 07/20/2022 12 1     Platelets 53/78/1001 233     nRBC 07/20/2022 0     Neutrophils Relative 07/20/2022 69     Immat GRANS % 07/20/2022 1     Lymphocytes Relative 07/20/2022 20     Monocytes Relative 07/20/2022 7     Eosinophils Relative 07/20/2022 3     Basophils Relative 07/20/2022 0     Neutrophils Absolute 07/20/2022 6 97     Immature Grans Absolute 07/20/2022 0 06     Lymphocytes Absolute 07/20/2022 1 93     Monocytes Absolute 07/20/2022 0 68     Eosinophils Absolute 07/20/2022 0 26     Basophils Absolute 07/20/2022 0 01     Sodium 07/20/2022 138     Potassium 07/20/2022 5 3     Chloride 07/20/2022 104     CO2 07/20/2022 27     ANION GAP 07/20/2022 7     BUN 07/20/2022 31 (A)    Creatinine 07/20/2022 1 87 (A)    Glucose 07/20/2022 137     Calcium 07/20/2022 8 5     AST 07/20/2022 92 (A)    ALT 07/20/2022 49     Alkaline Phosphatase 07/20/2022 92     Total Protein 07/20/2022 6 6     Albumin 07/20/2022 3 7     Total Bilirubin 07/20/2022 0 39     eGFR 07/20/2022 26     SARS-CoV-2 07/20/2022 Positive (A)    INFLUENZA A PCR 07/20/2022 Negative     INFLUENZA B PCR 07/20/2022 Negative     RSV PCR 07/20/2022 Negative     pH, Tato 07/20/2022 7  301     pCO2, Tato 07/20/2022 49 7     pO2, Tato 07/20/2022 48 1 (A)    HCO3, Tato 07/20/2022 24 0     Base Excess, Tato 07/20/2022 -2 8     O2 Content, Tato 07/20/2022 13 6     O2 HGB, VENOUS 07/20/2022 78 0     D-Dimer, Quant 07/20/2022 0 58 (A)    PTT 07/20/2022 26     Protime 07/20/2022 12 4     INR 07/20/2022 0 93     POC Glucose 07/20/2022 90     Ventricular Rate 07/20/2022 73     Atrial Rate 07/20/2022 69     WY Interval 07/20/2022 228     QRSD Interval 07/20/2022 88     QT Interval 07/20/2022 388     QTC Interval 07/20/2022 427     P Axis 07/20/2022 55     QRS Axis 07/20/2022 39     T Wave Axis 07/20/2022 121     Ventricular Rate 07/20/2022 67     Atrial Rate 07/20/2022 67     WY Interval 07/20/2022 226     QRSD Interval 07/20/2022 84     QT Interval 07/20/2022 404     QTC Interval 07/20/2022 426     P Axis 07/20/2022 53     QRS Axis 07/20/2022 10     T Wave Axis 07/20/2022 50     Ventricular Rate 07/20/2022 61     Atrial Rate 07/20/2022 61     WY Interval 07/20/2022 262     QRSD Interval 07/20/2022 80     QT Interval 07/20/2022 426     QTC Interval 07/20/2022 428     P Axis 07/20/2022 55     QRS Axis 07/20/2022 21     T Wave Axis 07/20/2022 56     PTT 07/20/2022 76 (A)    POC Glucose 07/20/2022 206 (A)    POC Glucose 07/21/2022 226 (A)    PTT 07/21/2022 88 (A)    WBC 07/21/2022 14 72 (A)    RBC 07/21/2022 3 70 (A)    Hemoglobin 07/21/2022 10 6 (A)    Hematocrit 07/21/2022 33 2 (A)    MCV 07/21/2022 90     MCH 07/21/2022 28 6     MCHC 07/21/2022 31 9     RDW 07/21/2022 13 9     MPV 07/21/2022 11 9     Platelets 97/44/3387 207     nRBC 07/21/2022 0     Neutrophils Relative 07/21/2022 89 (A)    Immat GRANS % 07/21/2022 1     Lymphocytes Relative 07/21/2022 7 (A)    Monocytes Relative 07/21/2022 3 (A)    Eosinophils Relative 07/21/2022 0     Basophils Relative 07/21/2022 0     Neutrophils Absolute 07/21/2022 13 13 (A)    Immature Grans Absolute 07/21/2022 0 11     Lymphocytes Absolute 07/21/2022 1 03     Monocytes Absolute 07/21/2022 0 43     Eosinophils Absolute 07/21/2022 0 00     Basophils Absolute 07/21/2022 0 02     Sodium 07/21/2022 134 (A)    Potassium 07/21/2022 4 4     Chloride 07/21/2022 103     CO2 07/21/2022 25     ANION GAP 07/21/2022 6     BUN 07/21/2022 33 (A)    Creatinine 07/21/2022 1 71 (A)    Glucose 07/21/2022 250 (A)    Calcium 07/21/2022 8 5     Corrected Calcium 07/21/2022 9 0     AST 07/21/2022 38     ALT 07/21/2022 33     Alkaline Phosphatase 07/21/2022 86     Total Protein 07/21/2022 5 7 (A)    Albumin 07/21/2022 3 4 (A)    Total Bilirubin 07/21/2022 0 27     eGFR 07/21/2022 29     CRP 07/21/2022 25 3 (A)    Procalcitonin 07/21/2022 0 08     POC Glucose 07/21/2022 240 (A)    POC Glucose 07/20/2022 215 (A)    POC Glucose 07/21/2022 124                              Imaging: I have personally reviewed pertinent films in PACS      Code Status: Level 3 - DNAR and DNI        Portions of the record may have been created with voice recognition software   Occasional wrong word or "sound a like" substitutions may have occurred due to the inherent limitations of voice recognition software  Read the chart carefully and recognize, using context, where substitutions have occurred

## 2022-07-21 NOTE — PROGRESS NOTES
[facility]  Progress Note Jethro Arguello 1950, 67 y o  female MRN: [id number]  Unit/Bed#: S -01 Encounter: [id number]  Primary Care Provider: Davis Becerra MD   Date and time admitted to hospital: 7/20/2022  6:14 AM  Addendum 2:00 p m  Patient was reporting some tingling and twinges in her right foot  Given that she has had recent surgery I did reach out to the podiatrist on-call covering for her surgeon and requested evaluation    * Acute respiratory failure with hypoxia ([facility] [address] )  Assessment & Plan  · Patient with underlying history of COPD, treated sleep apnea and pulmonary hypertension who presents with sudden-onset shortness of breath in the middle of the night with confirmed hypoxia readings at home (82%) and also upon arrival to the ER   · Suspect this is due to [address]  · V/Q scan low prob PE; noted mildly elevated D-dimer and recent surgery and no CTA done given CKD  · Continue supportive oxygen and wean off as able    COVID-19 virus infection  Assessment & Plan  · Vaccinated patient, tested positive for COVID-19 on admission  Symptoms included cough, shortness of breath, hypoxia and nasal congestion  · Follow mild pathway including day #2 IV remdesivir and IV Decadron  · Monitor oxygen saturations and wean down as tolerated  · Add respiratory protocol and MDI    S/P foot surgery  Assessment & Plan  · Patient is postop day 2 status post fusion right foot yesterday    Continue nonweightbearing to right lower extremity and use of walker  · Continue pain medication regimen  · Outpatient Podiatry follow-up    Type 2 diabetes mellitus with diabetic nephropathy, with long-term current use of insulin [facility])  Assessment & Plan  Lab Results   Component Value Date    HGBA1C 6 8 (H) 07/07/2022     Recent Labs     07/20/22  1743 07/20/22  2123 07/21/22  0220 07/21/22  0718   POCGLU 215* 206* 226* 240*     Blood Sugar Average: Last 72 hrs:  · (P) 195 4   · optimal blood glucose control history, follows with endocrinology, has CGM and at home takes levemir 50 units hs and mealtime insulin [id number] u  · Monitor on Accu-Cheks with additional sliding scale coverage; given evidence of steroid induced hyperglycemia will also add some additional daytime Lantus    Stage 4 chronic kidney disease [facility])  Assessment & Plan  Lab Results   Component Value Date    EGFR 29 07/21/2022    EGFR 26 07/20/2022    EGFR 30 05/27/2022    CREATININE 1 71 (H) 07/21/2022    CREATININE 1 87 (H) 07/20/2022    CREATININE 1 64 (H) 05/27/2022   · Follows with Nephrology  Baseline typically 1 6-2  Patient remains within baseline  · Avoid nephrotoxic agents, monitor volume status    Essential hypertension  Assessment & Plan  · Continue home medicine including Norvasc, Lopressor, Demadex and substitute losartan for olmesartan    BARBARA (obstructive sleep apnea)  Assessment & Plan  · Has inspire device, follows with pulmonology      VTE Pharmacologic Prophylaxis: VTE Score: 6 low-dose heparin drip per COVID protocol    Patient Centered Rounds: I performed bedside rounds with nursing staff today  Discussions with Specialists or Other Care Team Provider:     Education and Discussions with Family / Patient: Patient declined call to   Time Spent for Care: 25 min  More than 50% of total time spent on counseling and coordination of care as described above  Current Length of Stay: 1 day(s)  Current Patient Status: Inpatient   Certification Statement: The patient will continue to require additional inpatient hospital stay due to Ongoing IV steroids and remdesivir  Discharge Plan: Anticipate discharge tomorrow to home if clinically improved    Code Status: Level 3 - DNAR and DNI    Subjective:   Patient notes that her cough is more dry now and her chest is little tight, she was enquiring about possible inhaler as she states the 1 she has at home to use very sparingly was likely out of doses    Her appetite is good and she has not had any vomiting or diarrhea  No fever chills, or increased shortness of breath noted  Overall feeling better and interested in dc tomorrow    Objective:     Vitals:   Temp (24hrs), Av °F (36 7 °C), Min:97 6 °F (36 4 °C), Max:98 6 °F (37 °C)    Temp:  [97 6 °F (36 4 °C)-98 6 °F (37 °C)] 97 6 °F (36 4 °C)  HR:  [60-81] 71  Resp:  [18] 18  BP: (119-148)/(62-76) 120/62  SpO2:  [92 %-97 %] 96 %  Body mass index is 29 23 kg/m²  Input and Output Summary (last 24 hours):   No intake or output data in the 24 hours ending 22 1021    Physical Exam:   Physical Exam  Vitals reviewed  Constitutional:       General: She is not in acute distress  Appearance: Normal appearance  She is obese  She is not ill-appearing, toxic-appearing or diaphoretic  Comments: Quite well-appearing and in no distress   Eyes:      General: No scleral icterus  Right eye: No discharge  Left eye: No discharge  Conjunctiva/sclera: Conjunctivae normal    Cardiovascular:      Rate and Rhythm: Normal rate and regular rhythm  Heart sounds: No murmur heard  Pulmonary:      Effort: No respiratory distress  Breath sounds: No stridor  No wheezing, rhonchi or rales  Comments: Mild dry cough noted  No wheezing, dyspnea, tachypnea  Oxygen saturations high 90s on 2 L  Abdominal:      General: There is no distension  Palpations: Abdomen is soft  Tenderness: There is no abdominal tenderness  There is no guarding  Musculoskeletal:      Right lower leg: No edema  Left lower leg: No edema  Skin:     General: Skin is warm and dry  Coloration: Skin is not jaundiced or pale  Findings: No bruising, erythema, lesion or rash  Neurological:      General: No focal deficit present  Mental Status: She is alert  Mental status is at baseline        Comments: Awake alert interactive, excellent historian, no confusion   Psychiatric:         Mood and Affect: Mood normal          Thought Content:  Thought content normal          Judgment: Judgment normal           Additional Data:     Labs:  Results from last 7 days   Lab Units 07/21/22  0508   WBC Thousand/uL 14 72*   HEMOGLOBIN g/dL 10 6*   HEMATOCRIT % 33 2*   PLATELETS Thousands/uL 207   NEUTROS PCT % 89*   LYMPHS PCT % 7*   MONOS PCT % 3*   EOS PCT % 0     Results from last 7 days   Lab Units 07/21/22  0508   SODIUM mmol/L 134*   POTASSIUM mmol/L 4 4   CHLORIDE mmol/L 103   CO2 mmol/L 25   BUN mg/dL 33*   CREATININE mg/dL 1 71*   ANION GAP mmol/L 6   CALCIUM mg/dL 8 5   ALBUMIN g/dL 3 4*   TOTAL BILIRUBIN mg/dL 0 27   ALK PHOS U/L 86   ALT U/L 33   AST U/L 38   GLUCOSE RANDOM mg/dL 250*     Results from last 7 days   Lab Units 07/20/22  0734   INR  0 93     Results from last 7 days   Lab Units 07/21/22  0718 07/21/22  0220 07/20/22  2123 07/20/22  1743 07/20/22  1338 07/19/22  1159 07/19/22  0742   POC GLUCOSE mg/dl 240* 226* 206* 215* 90 116 129         Results from last 7 days   Lab Units 07/21/22  0508   PROCALCITONIN ng/ml 0 08       Lines/Drains:  Invasive Devices  Report    Peripheral Intravenous Line  Duration           Peripheral IV 07/20/22 Left Arm 1 day              Imaging:    Recent Cultures (last 7 days):         Last 24 Hours Medication List:   Current Facility-Administered Medications   Medication Dose Route Frequency Provider Last Rate    acetaminophen  650 mg Oral Q6H PRN Rufina Peace PA-C      albuterol  2 puff Inhalation Q4H PRN Rufina Peace PA-C      amLODIPine  5 mg Oral Daily Rufina Peace PA-C      atorvastatin  40 mg Oral Daily With [employer], [place]      benzonatate  100 mg Oral TID PRN [place] PA-C      cholecalciferol  2,000 Units Oral Daily Rufina Peace PA-C      dexamethasone  6 mg Intravenous Q24H Rufina Peace PA-C      DULoxetine  90 mg Oral Daily Rufina Peace PA-C      famotidine  10 mg Oral HS Rufina Peace PA-C      fish oil  1,000 mg Oral Daily Rufina Peace, PA-C      fluticasone  2 spray Nasal Daily Rufina Kobi, PA-C      gabapentin  100 mg Oral Daily Rufina Kobi, PA-C      guaiFENesin  600 mg Oral Q12H [address] Rufinafreddie Peace, PA-C      heparin (porcine)  3-20 Units/kg/hr (Order-Specific) Intravenous Titrated Rufina Mathewt, PA-C 12 Units/kg/hr (07/21/22 0600)    heparin (porcine)  2,000 Units Intravenous Q1H PRN Rufina Kobi, PA-C      heparin (porcine)  4,000 Units Intravenous Q1H PRN Rufina Kobi, PA-C      insulin glargine  50 Units Subcutaneous HS Rufina Stoarnav, PA-C      insulin glargine  7 Units Subcutaneous QAM Rufina Kobi, PA-C      insulin lispro  1-5 Units Subcutaneous [address], PA-C      insulin lispro  1-6 Units Subcutaneous TID AC Rufina Kobi, PA-C      insulin lispro  16 Units Subcutaneous Daily With Breakfast Rufina Kobi, PA-C      insulin lispro  16 Units Subcutaneous Daily With Lunch Rufina Stoarnav, PA-C      insulin lispro  25 Units Subcutaneous Daily With [employer], PA-C      levothyroxine  112 mcg Oral Early Morning Rufina Kobi, PA-C      losartan  50 mg Oral Daily Rufina Stoarnav, PA-C      metoprolol tartrate  50 mg Oral Q12H [address] [facility]      oxyCODONE  2 5 mg Oral Q4H PRN Rufina Stoarnav, PA-C      oxyCODONE  5 mg Oral Q4H PRN Rufina Stoarnav, PA-C      pantoprazole  40 mg Oral Daily Before Breakfast Rufinafreddie Peace, PA-C      pramipexole  0 25 mg Oral HS Rufina Stoarnav, PA-C      remdesivir  100 mg Intravenous Q24H Rufina Kobi, PA-C      saccharomyces boulardii  250 mg Oral BID Rufina Stoarnav, PA-C      torsemide  20 mg Oral Daily Rufina Peace, PA-C          Today, Patient Was Seen By: Alysa Solomon PA-C    **Please Note: This note may have been constructed using a voice recognition system  **

## 2022-07-21 NOTE — ASSESSMENT & PLAN NOTE
· Patient is postop day 2 status post fusion right foot yesterday    Continue nonweightbearing to right lower extremity and use of walker  · Continue pain medication regimen  · Outpatient Podiatry follow-up

## 2022-07-21 NOTE — ASSESSMENT & PLAN NOTE
Lab Results   Component Value Date    EGFR 29 07/21/2022    EGFR 26 07/20/2022    EGFR 30 05/27/2022    CREATININE 1 71 (H) 07/21/2022    CREATININE 1 87 (H) 07/20/2022    CREATININE 1 64 (H) 05/27/2022   · Follows with Nephrology  Baseline typically 1 6-2  Patient remains within baseline      · Avoid nephrotoxic agents, monitor volume status

## 2022-07-21 NOTE — RESPIRATORY THERAPY NOTE
RT Protocol Note  Peter Beauchamp 67 y o  female MRN: [id number]  Unit/Bed#: S -01 Encounter: [id number]    Assessment    Principal Problem:    Acute respiratory failure with hypoxia (HCC)  Active Problems:    Stage 4 chronic kidney disease (HCC)    BARBARA (obstructive sleep apnea)    Type 2 diabetes mellitus with diabetic nephropathy, with long-term current use of insulin (HCC)    Essential hypertension    COVID-19 virus infection    S/P foot surgery      Home Pulmonary Medications:  ALB MDI     Past Medical History:   Diagnosis Date    Allergic     Anesthesia     pt reports "had to use double lumen endo tube for hiatal hernia repair/so surgeon could get to where he needed to work"    Arthritis     Aspiration into airway     Basal cell carcinoma 2007    left cheek     BCC (basal cell carcinoma) 05/27/2021    Left Nasal tip    Cancer ([address] )     squamous cell cancer on forhead    Cancer ([facility] )     basal cell on nose     Chronic kidney disease 2000, 2018    Stones, kidney disease stage 4    Chronic pain disorder     bilat feet and joint pain on occas    Colon polyp     Constipation     COPD (chronic obstructive pulmonary disease) ([facility] [address] )     COVID-19 08/2021    recovered at home/did receive monoclonal infusion    Dental bridge present     Depression     Diabetes mellitus ([address] )     Type 2    Diabetic neuropathy ([facility] [address] )     Disease of thyroid gland     Family history of thyroid problem     Fatty liver     GERD (gastroesophageal reflux disease)     Heart burn     Hiatal hernia     History of pneumonia     Hyperlipidemia     Hyperplasia, parathyroid ([address] )     Hypertension     Kidney problem     Kidney stone     Memory loss Julu2 2020    Motion sickness     Motion sickness     Neck pain     Obesity 1978    Obesity (BMI 30 0-34  9)     Pollen allergies     RLS (restless legs syndrome)     SCC (squamous cell carcinoma) 05/04/2021    left mid forehead    Seasonal allergies     Sleep apnea     has inspire    Squamous cell skin cancer     left cheek     Swollen ankles     Urinary incontinence     Urinary tract infection 2022    Wears glasses      Social History     Socioeconomic History    Marital status: /Civil Union     Spouse name: None    Number of children: None    Years of education: None    Highest education level: None   Occupational History    Occupation: RETIRED   Tobacco Use    Smoking status: Former Smoker     Packs/day: 2 00     Years: 10 00     Pack years: 20 00     Types: Cigarettes     Start date: 1/10/1968     Quit date:      Years since quittin 5    Smokeless tobacco: Never Used    Tobacco comment: Smoked 2 pack a day   Vaping Use    Vaping Use: Never used   Substance and Sexual Activity    Alcohol use: Yes     Comment: very rare - 2 or 3 a year    Drug use: No    Sexual activity: Not Currently     Comment: defer   Other Topics Concern    None   Social History Narrative    None     Social Determinants of Health     Financial Resource Strain: Not on file   Food Insecurity: Not on file   Transportation Needs: Not on file   Physical Activity: Not on file   Stress: Not on file   Social Connections: Not on file   Intimate Partner Violence: Not on file   Housing Stability: Not on file       Subjective         Objective    Physical Exam:   Assessment Type: (P) Assess only  General Appearance: (P) Alert, Awake  Respiratory Pattern: (P) Normal  Chest Assessment: (P) Chest expansion symmetrical  Bilateral Breath Sounds: (P) Diminished    Vitals:  Blood pressure 120/62, pulse 71, temperature 97 6 °F (36 4 °C), temperature source Oral, resp  rate 18, height 5' 3" (1 6 m), weight 74 8 kg (165 lb), SpO2 96 %  Imaging and other studies: I have personally reviewed pertinent reports  Plan    Respiratory Plan: (P) No distress/Pulmonary history        Resp Comments: (P) pt evaluated per protocol, pt here for COVID +, with Hx of COPD and uses ALB MDI at home   pt currently does not require scheduled nebs  Will ordrer PRN ALB MDI for SOB for wheezing

## 2022-07-21 NOTE — ASSESSMENT & PLAN NOTE
· Vaccinated patient, tested positive for COVID-19 on admission    Symptoms included cough, shortness of breath, hypoxia and nasal congestion  · Follow mild pathway including day #2 IV remdesivir and IV Decadron  · Monitor oxygen saturations and wean down as tolerated  · Add respiratory protocol and MDI

## 2022-07-22 ENCOUNTER — TRANSITIONAL CARE MANAGEMENT (OUTPATIENT)
Dept: FAMILY MEDICINE CLINIC | Facility: CLINIC | Age: 72
End: 2022-07-22

## 2022-07-22 VITALS
DIASTOLIC BLOOD PRESSURE: 73 MMHG | RESPIRATION RATE: 14 BRPM | HEART RATE: 59 BPM | SYSTOLIC BLOOD PRESSURE: 145 MMHG | HEIGHT: 63 IN | WEIGHT: 165 LBS | BODY MASS INDEX: 29.23 KG/M2 | OXYGEN SATURATION: 97 % | TEMPERATURE: 98 F

## 2022-07-22 LAB
ALBUMIN SERPL BCP-MCNC: 3.5 G/DL (ref 3.5–5)
ALP SERPL-CCNC: 81 U/L (ref 34–104)
ALT SERPL W P-5'-P-CCNC: 23 U/L (ref 7–52)
ANION GAP SERPL CALCULATED.3IONS-SCNC: 7 MMOL/L (ref 4–13)
APTT PPP: 55 SECONDS (ref 23–37)
AST SERPL W P-5'-P-CCNC: 21 U/L (ref 13–39)
BASOPHILS # BLD AUTO: 0.03 THOUSANDS/ΜL (ref 0–0.1)
BASOPHILS NFR BLD AUTO: 0 % (ref 0–1)
BILIRUB SERPL-MCNC: 0.24 MG/DL (ref 0.2–1)
BUN SERPL-MCNC: 41 MG/DL (ref 5–25)
CALCIUM SERPL-MCNC: 8.8 MG/DL (ref 8.4–10.2)
CHLORIDE SERPL-SCNC: 104 MMOL/L (ref 96–108)
CO2 SERPL-SCNC: 27 MMOL/L (ref 21–32)
CREAT SERPL-MCNC: 1.65 MG/DL (ref 0.6–1.3)
CRP SERPL QL: 19.4 MG/L
EOSINOPHIL # BLD AUTO: 0 THOUSAND/ΜL (ref 0–0.61)
EOSINOPHIL NFR BLD AUTO: 0 % (ref 0–6)
ERYTHROCYTE [DISTWIDTH] IN BLOOD BY AUTOMATED COUNT: 14.2 % (ref 11.6–15.1)
GFR SERPL CREATININE-BSD FRML MDRD: 30 ML/MIN/1.73SQ M
GLUCOSE SERPL-MCNC: 100 MG/DL (ref 65–140)
GLUCOSE SERPL-MCNC: 126 MG/DL (ref 65–140)
GLUCOSE SERPL-MCNC: 161 MG/DL (ref 65–140)
GLUCOSE SERPL-MCNC: 297 MG/DL (ref 65–140)
GLUCOSE SERPL-MCNC: 59 MG/DL (ref 65–140)
HCT VFR BLD AUTO: 32.1 % (ref 34.8–46.1)
HGB BLD-MCNC: 10.6 G/DL (ref 11.5–15.4)
IMM GRANULOCYTES # BLD AUTO: 0.11 THOUSAND/UL (ref 0–0.2)
IMM GRANULOCYTES NFR BLD AUTO: 1 % (ref 0–2)
LYMPHOCYTES # BLD AUTO: 1.26 THOUSANDS/ΜL (ref 0.6–4.47)
LYMPHOCYTES NFR BLD AUTO: 9 % (ref 14–44)
MCH RBC QN AUTO: 29.5 PG (ref 26.8–34.3)
MCHC RBC AUTO-ENTMCNC: 33 G/DL (ref 31.4–37.4)
MCV RBC AUTO: 89 FL (ref 82–98)
MONOCYTES # BLD AUTO: 0.65 THOUSAND/ΜL (ref 0.17–1.22)
MONOCYTES NFR BLD AUTO: 5 % (ref 4–12)
NEUTROPHILS # BLD AUTO: 12.03 THOUSANDS/ΜL (ref 1.85–7.62)
NEUTS SEG NFR BLD AUTO: 85 % (ref 43–75)
NRBC BLD AUTO-RTO: 0 /100 WBCS
PLATELET # BLD AUTO: 208 THOUSANDS/UL (ref 149–390)
PMV BLD AUTO: 12.2 FL (ref 8.9–12.7)
POTASSIUM SERPL-SCNC: 4.1 MMOL/L (ref 3.5–5.3)
PROT SERPL-MCNC: 5.9 G/DL (ref 6.4–8.4)
RBC # BLD AUTO: 3.59 MILLION/UL (ref 3.81–5.12)
SODIUM SERPL-SCNC: 138 MMOL/L (ref 135–147)
WBC # BLD AUTO: 14.08 THOUSAND/UL (ref 4.31–10.16)

## 2022-07-22 PROCEDURE — 80053 COMPREHEN METABOLIC PANEL: CPT | Performed by: PHYSICIAN ASSISTANT

## 2022-07-22 PROCEDURE — 85025 COMPLETE CBC W/AUTO DIFF WBC: CPT | Performed by: PHYSICIAN ASSISTANT

## 2022-07-22 PROCEDURE — 85730 THROMBOPLASTIN TIME PARTIAL: CPT | Performed by: INTERNAL MEDICINE

## 2022-07-22 PROCEDURE — 82948 REAGENT STRIP/BLOOD GLUCOSE: CPT

## 2022-07-22 PROCEDURE — 86140 C-REACTIVE PROTEIN: CPT | Performed by: PHYSICIAN ASSISTANT

## 2022-07-22 PROCEDURE — 99239 HOSP IP/OBS DSCHRG MGMT >30: CPT | Performed by: PHYSICIAN ASSISTANT

## 2022-07-22 RX ORDER — DEXAMETHASONE 6 MG/1
6 TABLET ORAL
Qty: 4 TABLET | Refills: 0 | Status: SHIPPED | OUTPATIENT
Start: 2022-07-23 | End: 2022-08-03 | Stop reason: HOSPADM

## 2022-07-22 RX ORDER — BENZONATATE 100 MG/1
100 CAPSULE ORAL 3 TIMES DAILY PRN
Qty: 20 CAPSULE | Refills: 0 | Status: SHIPPED | OUTPATIENT
Start: 2022-07-22 | End: 2022-08-03 | Stop reason: HOSPADM

## 2022-07-22 RX ORDER — GUAIFENESIN 600 MG/1
600 TABLET, EXTENDED RELEASE ORAL EVERY 12 HOURS SCHEDULED
Refills: 0
Start: 2022-07-22 | End: 2022-08-03 | Stop reason: HOSPADM

## 2022-07-22 RX ADMIN — GABAPENTIN 100 MG: 100 CAPSULE ORAL at 07:44

## 2022-07-22 RX ADMIN — LEVOTHYROXINE SODIUM 112 MCG: 112 TABLET ORAL at 05:45

## 2022-07-22 RX ADMIN — REMDESIVIR 100 MG: 100 INJECTION, POWDER, LYOPHILIZED, FOR SOLUTION INTRAVENOUS at 11:03

## 2022-07-22 RX ADMIN — HEPARIN SODIUM 2000 UNITS: 1000 INJECTION INTRAVENOUS; SUBCUTANEOUS at 07:50

## 2022-07-22 RX ADMIN — Medication 2000 UNITS: at 07:47

## 2022-07-22 RX ADMIN — Medication 250 MG: at 07:47

## 2022-07-22 RX ADMIN — OMEGA-3 FATTY ACIDS CAP 1000 MG 1000 MG: 1000 CAP at 07:44

## 2022-07-22 RX ADMIN — LOSARTAN POTASSIUM 50 MG: 50 TABLET, FILM COATED ORAL at 07:45

## 2022-07-22 RX ADMIN — FLUTICASONE PROPIONATE 2 SPRAY: 50 SPRAY, METERED NASAL at 07:49

## 2022-07-22 RX ADMIN — DULOXETINE HYDROCHLORIDE 90 MG: 60 CAPSULE, DELAYED RELEASE ORAL at 07:46

## 2022-07-22 RX ADMIN — GUAIFENESIN 600 MG: 600 TABLET ORAL at 07:47

## 2022-07-22 RX ADMIN — METOPROLOL TARTRATE 50 MG: 50 TABLET, FILM COATED ORAL at 07:45

## 2022-07-22 RX ADMIN — PANTOPRAZOLE SODIUM 40 MG: 40 TABLET, DELAYED RELEASE ORAL at 05:45

## 2022-07-22 RX ADMIN — TORSEMIDE 20 MG: 20 TABLET ORAL at 07:45

## 2022-07-22 RX ADMIN — ALBUTEROL SULFATE 2 PUFF: 90 AEROSOL, METERED RESPIRATORY (INHALATION) at 08:00

## 2022-07-22 RX ADMIN — INSULIN LISPRO 16 UNITS: 100 INJECTION, SOLUTION INTRAVENOUS; SUBCUTANEOUS at 07:53

## 2022-07-22 RX ADMIN — INSULIN LISPRO 3 UNITS: 100 INJECTION, SOLUTION INTRAVENOUS; SUBCUTANEOUS at 02:26

## 2022-07-22 RX ADMIN — AMLODIPINE BESYLATE 5 MG: 5 TABLET ORAL at 07:48

## 2022-07-22 RX ADMIN — ACETAMINOPHEN 650 MG: 325 TABLET ORAL at 02:17

## 2022-07-22 RX ADMIN — ACETAMINOPHEN 650 MG: 325 TABLET ORAL at 08:07

## 2022-07-22 RX ADMIN — OXYCODONE HYDROCHLORIDE 5 MG: 5 TABLET ORAL at 03:42

## 2022-07-22 NOTE — ASSESSMENT & PLAN NOTE
Lab Results   Component Value Date    EGFR 30 07/22/2022    EGFR 29 07/21/2022    EGFR 26 07/20/2022    CREATININE 1 65 (H) 07/22/2022    CREATININE 1 71 (H) 07/21/2022    CREATININE 1 87 (H) 07/20/2022   · Follows with Nephrology  Baseline typically 1 6-2  Patient remains within baseline      · Avoid nephrotoxic agents, monitor volume status

## 2022-07-22 NOTE — ASSESSMENT & PLAN NOTE
· Vaccinated patient, tested positive for COVID-19 on admission  Symptoms included cough, shortness of breath, hypoxia and nasal congestion    Overall improving, continue supportive care  · Follow mild pathway including day #3 IV remdesivir and IV Decadron; will give 4 more days of oral Decadron at discharge  · Added respiratory protocol and MDI

## 2022-07-22 NOTE — ASSESSMENT & PLAN NOTE
· Patient with underlying history of COPD, treated sleep apnea and pulmonary hypertension who presents with sudden-onset shortness of breath in the middle of the night with confirmed hypoxia readings at home (82%) and also upon arrival to the ER   · Suspect this is due to COVID19  · V/Q scan low prob PE; noted mildly elevated D-dimer and recent surgery and no CTA done given CKD  · Weaned off oxygen successfully

## 2022-07-22 NOTE — ASSESSMENT & PLAN NOTE
Lab Results   Component Value Date    HGBA1C 6 8 (H) 07/07/2022     Recent Labs     07/21/22  1540 07/21/22  2039 07/22/22  0225 07/22/22  0744   POCGLU 167* 251* 297* 126     Blood Sugar Average: Last 72 hrs:  · (P) 199 [id number]   · optimal blood glucose control history, follows with endocrinology, has CGM and at home takes levemir 50 units hs and mealtime insulin 84Y-04W-11 u  · noted evidence of steroid induced hyperglycemia

## 2022-07-22 NOTE — DISCHARGE INSTR - AVS FIRST PAGE
Dear Jacquelin King,     It was our pleasure to care for you here at [facility]  It is our hope that we were always able to exceed the expected standards for your care during your stay  You were hospitalized due to COVID 19 with hypoxia  You were cared for on the 3rd floor by Zechariah Camacho PA-C under the service of Woody Small MD with [facility] who covers for your primary care physician (PCP), Migel Diana MD, while you were hospitalized  If you have any questions or concerns related to this hospitalization, you may contact us at [phone]  For follow up as well as any medication refills, we recommend that you follow up with your primary care physician  A registered nurse will reach out to you by phone within a few days after your discharge to answer any additional questions that you may have after going home  However, at this time we provide for you here, the most important instructions / recommendations at discharge:     Notable Medication Adjustments -   Decadron 6 mg daily x 4 more days starting tomorrow  Cough meds  Albuterol inhaler  Testing Required after Discharge -   Routine BMP  Important follow up information -   Family doctor  Other Instructions -   Monitor your sugars carefully  You may need to increase your insulin for the next several days while you are on steroids  Please review this entire after visit summary as additional general instructions including medication list, appointments, activity, diet, any pertinent wound care, and other additional recommendations from your care team that may be provided for you        Sincerely,     Zechariah Camacho PA-C

## 2022-07-22 NOTE — DISCHARGE SUMMARY
[facility]  Discharge- Yves Severin 1950, 67 y o  female MRN: [id number]  Unit/Bed#: S -01 Encounter: [id number]  Primary Care Provider: Dirk Will MD   Date and time admitted to hospital: 7/20/2022  6:14 AM    * Acute respiratory failure with hypoxia ([address] )  Assessment & Plan  · Patient with underlying history of COPD, treated sleep apnea and pulmonary hypertension who presents with sudden-onset shortness of breath in the middle of the night with confirmed hypoxia readings at home (82%) and also upon arrival to the ER   · Suspect this is due to [address]  · V/Q scan low prob PE; noted mildly elevated D-dimer and recent surgery and no CTA done given CKD  · Weaned off oxygen successfully    COVID-19 virus infection  Assessment & Plan  · Vaccinated patient, tested positive for COVID-19 on admission  Symptoms included cough, shortness of breath, hypoxia and nasal congestion  Overall improving, continue supportive care  · Follow mild pathway including day #3 IV remdesivir and IV Decadron; will give 4 more days of oral Decadron at discharge  · Added respiratory protocol and MDI    S/P foot surgery  Assessment & Plan  · Patient is postop day 3 status post fusion right foot    Continue nonweightbearing to right lower extremity and use of walker  · Continue pain medication regimen  · Appreciate inpatient Podiatry evaluation, they reported patient has routine healing  · Outpatient Podiatry follow-up    Type 2 diabetes mellitus with diabetic nephropathy, with long-term current use of insulin [facility])  Assessment & Plan  Lab Results   Component Value Date    HGBA1C 6 8 (H) 07/07/2022     Recent Labs     07/21/22  1540 07/21/22  2039 07/22/22  0225 07/22/22  0744   POCGLU 167* 251* 297* 126     Blood Sugar Average: Last 72 hrs:  · (P) 199 [id number]   · optimal blood glucose control history, follows with endocrinology, has CGM and at home takes levemir 50 units hs and mealtime insulin 08P-09X-78 u  · noted evidence of steroid induced hyperglycemia    Stage 4 chronic kidney disease [facility])  Assessment & Plan  Lab Results   Component Value Date    EGFR 30 07/22/2022    EGFR 29 07/21/2022    EGFR 26 07/20/2022    CREATININE 1 65 (H) 07/22/2022    CREATININE 1 71 (H) 07/21/2022    CREATININE 1 87 (H) 07/20/2022   · Follows with Nephrology  Baseline typically 1 6-2  Patient remains within baseline  · Avoid nephrotoxic agents, monitor volume status    Essential hypertension  Assessment & Plan  · Continue home medicine including Norvasc, Lopressor, Demadex and olmesartan    BARBARA (obstructive sleep apnea)  Assessment & Plan  · Has inspire device, follows with pulmonology    Medical Problems             Resolved Problems  Date Reviewed: 7/22/2022   None               Discharging Physician / Practitioner: Jaylin Bentley PA-C  PCP: Marva Stein MD  Admission Date:   Admission Orders (From admission, onward)     Ordered        07/20/22 1030  INPATIENT ADMISSION  Once                      Discharge Date: 07/22/22    Consultations During Hospital Stay:  · Podiatry    Procedures Performed:   · Xray foot  · VQ scan  · CXR    Significant Findings / Test Results:   · As above    Incidental Findings:   · none     Test Results Pending at Discharge (will require follow up):   · none     Outpatient Tests Requested:  · Routine bmp    Complications:  none    Reason for Admission:  Hypoxia    Hospital Course:   Schuyler Vargas is a 67 y o  female patient underlying history of CKD, diabetes, hypertension, sleep apnea who originally presented to the hospital on 7/20/2022 due to low oxygen saturation readings at home of 82% on room air  Patient had just had foot surgery the day prior and originally thought this may have been due to using Percocet for pain control but then was also noting some cough and shortness of breath    Upon arrival to the hospital she tested positive for COVID and was placed on 2 L of oxygen  She was given IV remdesivir and IV Decadron for 3 days and is stable today to transition to oral Decadron at discharge with supportive cough meds and inhalers as she has been successfully weaned off of oxygen  Please see above list of diagnoses and related plan for additional information  Condition at Discharge: stable    Discharge Day Visit / Exam:   Subjective:  Patient reporting some cough with dark sputum  Otherwise reports improvement overall and has been successfully weaned off oxygen  No fever, chills, chest pain, increased shortness of breath  Patient had a bad night and does not want to be in the hospital anymore, very eager to go home  She also reiterated to me that yesterday the issue that happened was her sliding off of the commode, she did not have any significant injury, did not hit her head or her foot  Vitals: Blood Pressure: 145/73 (07/22/22 0742)  Pulse: 59 (07/22/22 0742)  Temperature: 98 °F (36 7 °C) (07/22/22 0742)  Temp Source: Oral (07/22/22 0742)  Respirations: 14 (07/22/22 0742)  Height: 5' 3" (160 cm) (07/20/22 0622)  Weight - Scale: 74 8 kg (165 lb) (07/20/22 1418)  SpO2: 97 % (07/22/22 0742)  Exam:   Physical Exam  Vitals reviewed  Constitutional:       General: She is not in acute distress  Appearance: Normal appearance  She is obese  She is not ill-appearing, toxic-appearing or diaphoretic  Eyes:      General: No scleral icterus  Right eye: No discharge  Left eye: No discharge  Conjunctiva/sclera: Conjunctivae normal    Cardiovascular:      Rate and Rhythm: Normal rate and regular rhythm  Heart sounds: No murmur heard  Pulmonary:      Effort: No respiratory distress  Breath sounds: No stridor  No wheezing, rhonchi or rales  Comments: Off O2  No cough or wheezing  Abdominal:      General: There is no distension  Palpations: Abdomen is soft  Tenderness: There is no abdominal tenderness  There is no guarding  Musculoskeletal:      Right lower leg: No edema  Left lower leg: No edema  Skin:     General: Skin is warm and dry  Coloration: Skin is not jaundiced or pale  Findings: No bruising, erythema, lesion or rash  Neurological:      General: No focal deficit present  Mental Status: She is alert  Mental status is at baseline  Comments: No confusion          Discussion with Family:  Spoke with     Discharge instructions/Information to patient and family:   See after visit summary for information provided to patient and family  Provisions for Follow-Up Care:  See after visit summary for information related to follow-up care and any pertinent home health orders  Disposition:   home    Planned Readmission: none     Discharge Statement:  I spent 35 minutes discharging the patient  This time was spent on the day of discharge  I had direct contact with the patient on the day of discharge  Greater than 50% of the total time was spent examining patient, answering all patient questions, arranging and discussing plan of care with patient as well as directly providing post-discharge instructions  Additional time then spent on discharge activities  Case discussed with Nursing and Case Management    Discharge Medications:  See after visit summary for reconciled discharge medications provided to patient and/or family        **Please Note: This note may have been constructed using a voice recognition system**

## 2022-07-22 NOTE — ASSESSMENT & PLAN NOTE
· Patient is postop day 3 status post fusion right foot    Continue nonweightbearing to right lower extremity and use of walker  · Continue pain medication regimen  · Appreciate inpatient Podiatry evaluation, they reported patient has routine healing  · Outpatient Podiatry follow-up

## 2022-07-25 ENCOUNTER — APPOINTMENT (OUTPATIENT)
Dept: LAB | Facility: AMBULARY SURGERY CENTER | Age: 72
End: 2022-07-25
Payer: MEDICARE

## 2022-07-25 DIAGNOSIS — Z71.89 COMPLEX CARE COORDINATION: Primary | ICD-10-CM

## 2022-07-25 DIAGNOSIS — N18.4 STAGE 4 CHRONIC KIDNEY DISEASE (HCC): ICD-10-CM

## 2022-07-25 DIAGNOSIS — N18.30 CKD (CHRONIC KIDNEY DISEASE) STAGE 3, GFR 30-59 ML/MIN (HCC): ICD-10-CM

## 2022-07-25 DIAGNOSIS — I12.9 HYPERTENSIVE CHRONIC KIDNEY DISEASE WITH STAGE 1 THROUGH STAGE 4 CHRONIC KIDNEY DISEASE, OR UNSPECIFIED CHRONIC KIDNEY DISEASE: ICD-10-CM

## 2022-07-25 LAB
ANION GAP SERPL CALCULATED.3IONS-SCNC: 8 MMOL/L (ref 4–13)
BUN SERPL-MCNC: 48 MG/DL (ref 5–25)
CALCIUM SERPL-MCNC: 9.2 MG/DL (ref 8.3–10.1)
CHLORIDE SERPL-SCNC: 101 MMOL/L (ref 96–108)
CO2 SERPL-SCNC: 29 MMOL/L (ref 21–32)
CREAT SERPL-MCNC: 1.85 MG/DL (ref 0.6–1.3)
GFR SERPL CREATININE-BSD FRML MDRD: 26 ML/MIN/1.73SQ M
GLUCOSE SERPL-MCNC: 191 MG/DL (ref 65–140)
POTASSIUM SERPL-SCNC: 3.9 MMOL/L (ref 3.5–5.3)
SODIUM SERPL-SCNC: 138 MMOL/L (ref 135–147)

## 2022-07-25 PROCEDURE — 36415 COLL VENOUS BLD VENIPUNCTURE: CPT

## 2022-07-25 PROCEDURE — 80048 BASIC METABOLIC PNL TOTAL CA: CPT

## 2022-07-25 RX ORDER — TORSEMIDE 20 MG/1
TABLET ORAL
Qty: 90 TABLET | Refills: 3 | Status: SHIPPED | OUTPATIENT
Start: 2022-07-25

## 2022-07-26 ENCOUNTER — TELEPHONE (OUTPATIENT)
Dept: NEPHROLOGY | Facility: CLINIC | Age: 72
End: 2022-07-26

## 2022-07-26 ENCOUNTER — PATIENT OUTREACH (OUTPATIENT)
Dept: FAMILY MEDICINE CLINIC | Facility: CLINIC | Age: 72
End: 2022-07-26

## 2022-07-26 DIAGNOSIS — N18.4 STAGE 4 CHRONIC KIDNEY DISEASE (HCC): Primary | ICD-10-CM

## 2022-07-26 NOTE — PROGRESS NOTES
Received HR[facility] referral  Chart reviewed by [facility] RN  Patient was recently discharged from hospital with increased SOB and tested + for COVID-19  OPCM RN attempted to call patient  There was no answer and a message was left

## 2022-07-26 NOTE — TELEPHONE ENCOUNTER
Spoke with patient about the following, she expressed understanding and thanked us for the call:    ----- Message from Berhane Leach MD sent at 7/26/2022  7:20 AM EDT -----  Patient was recently discharged  Can you please order a basic metabolic profile in about 1 week  Thank you  ----- Message -----  From: Cristela Agudelo MD  Sent: 7/25/2022   8:06 PM EDT  To: Berhane Leach MD    Worsening renal function-- will fwd to Dr Parker Qureshi

## 2022-08-02 ENCOUNTER — APPOINTMENT (OUTPATIENT)
Dept: LAB | Facility: AMBULARY SURGERY CENTER | Age: 72
End: 2022-08-02
Payer: MEDICARE

## 2022-08-02 DIAGNOSIS — N18.4 STAGE 4 CHRONIC KIDNEY DISEASE (HCC): ICD-10-CM

## 2022-08-02 LAB
ANION GAP SERPL CALCULATED.3IONS-SCNC: 4 MMOL/L (ref 4–13)
BUN SERPL-MCNC: 37 MG/DL (ref 5–25)
CALCIUM SERPL-MCNC: 8.8 MG/DL (ref 8.3–10.1)
CHLORIDE SERPL-SCNC: 107 MMOL/L (ref 96–108)
CO2 SERPL-SCNC: 27 MMOL/L (ref 21–32)
CREAT SERPL-MCNC: 1.96 MG/DL (ref 0.6–1.3)
GFR SERPL CREATININE-BSD FRML MDRD: 24 ML/MIN/1.73SQ M
GLUCOSE P FAST SERPL-MCNC: 239 MG/DL (ref 65–99)
POTASSIUM SERPL-SCNC: 4.2 MMOL/L (ref 3.5–5.3)
SODIUM SERPL-SCNC: 138 MMOL/L (ref 135–147)

## 2022-08-02 PROCEDURE — 80048 BASIC METABOLIC PNL TOTAL CA: CPT

## 2022-08-02 PROCEDURE — 36415 COLL VENOUS BLD VENIPUNCTURE: CPT

## 2022-08-04 ENCOUNTER — TELEPHONE (OUTPATIENT)
Dept: FAMILY MEDICINE CLINIC | Facility: CLINIC | Age: 72
End: 2022-08-04

## 2022-08-04 ENCOUNTER — PATIENT OUTREACH (OUTPATIENT)
Dept: FAMILY MEDICINE CLINIC | Facility: CLINIC | Age: 72
End: 2022-08-04

## 2022-08-04 ENCOUNTER — OFFICE VISIT (OUTPATIENT)
Dept: PULMONOLOGY | Facility: CLINIC | Age: 72
End: 2022-08-04
Payer: MEDICARE

## 2022-08-04 ENCOUNTER — TELEPHONE (OUTPATIENT)
Dept: NEPHROLOGY | Facility: CLINIC | Age: 72
End: 2022-08-04

## 2022-08-04 ENCOUNTER — DOCUMENTATION (OUTPATIENT)
Dept: NEPHROLOGY | Facility: CLINIC | Age: 72
End: 2022-08-04

## 2022-08-04 ENCOUNTER — OFFICE VISIT (OUTPATIENT)
Dept: FAMILY MEDICINE CLINIC | Facility: CLINIC | Age: 72
End: 2022-08-04
Payer: MEDICARE

## 2022-08-04 VITALS
RESPIRATION RATE: 18 BRPM | SYSTOLIC BLOOD PRESSURE: 126 MMHG | WEIGHT: 160 LBS | BODY MASS INDEX: 28.35 KG/M2 | TEMPERATURE: 97.7 F | OXYGEN SATURATION: 96 % | HEART RATE: 70 BPM | HEIGHT: 63 IN | DIASTOLIC BLOOD PRESSURE: 72 MMHG

## 2022-08-04 VITALS
RESPIRATION RATE: 18 BRPM | BODY MASS INDEX: 28.35 KG/M2 | WEIGHT: 160 LBS | OXYGEN SATURATION: 96 % | DIASTOLIC BLOOD PRESSURE: 68 MMHG | SYSTOLIC BLOOD PRESSURE: 138 MMHG | HEIGHT: 63 IN | TEMPERATURE: 97.7 F | HEART RATE: 64 BPM

## 2022-08-04 DIAGNOSIS — J45.909 ASTHMA, UNSPECIFIED ASTHMA SEVERITY, UNSPECIFIED WHETHER COMPLICATED, UNSPECIFIED WHETHER PERSISTENT: Primary | ICD-10-CM

## 2022-08-04 DIAGNOSIS — N18.4 STAGE 4 CHRONIC KIDNEY DISEASE (HCC): Primary | ICD-10-CM

## 2022-08-04 DIAGNOSIS — G25.81 RLS (RESTLESS LEGS SYNDROME): ICD-10-CM

## 2022-08-04 DIAGNOSIS — U07.1 COVID-19 VIRUS INFECTION: Primary | ICD-10-CM

## 2022-08-04 PROCEDURE — 99214 OFFICE O/P EST MOD 30 MIN: CPT | Performed by: INTERNAL MEDICINE

## 2022-08-04 PROCEDURE — 99495 TRANSJ CARE MGMT MOD F2F 14D: CPT | Performed by: FAMILY MEDICINE

## 2022-08-04 RX ORDER — PRAMIPEXOLE DIHYDROCHLORIDE 0.25 MG/1
0.25 TABLET ORAL
Qty: 30 TABLET | Refills: 3 | Status: SHIPPED | OUTPATIENT
Start: 2022-08-04 | End: 2022-08-26

## 2022-08-04 RX ORDER — VITAMIN B COMPLEX
1 CAPSULE ORAL DAILY
COMMUNITY

## 2022-08-04 RX ORDER — ALBUTEROL SULFATE 90 UG/1
2 AEROSOL, METERED RESPIRATORY (INHALATION) EVERY 6 HOURS PRN
Qty: 54 G | Refills: 0 | Status: SHIPPED | OUTPATIENT
Start: 2022-08-04

## 2022-08-04 NOTE — PROGRESS NOTES
Chart reviewed  I called and spoke with patient briefly  She was unable to talk and requests I call back  Will follow

## 2022-08-04 NOTE — PROGRESS NOTES
Assessment/Plan:       Problem List Items Addressed This Visit        Other    COVID-19 virus infection - Primary      Reviewed hospital notes  Improved respiratory status  Reviewed note from pulm today  Reached out to Dr Gauri Schmidt (nephrology) - ok for patient to take calcium and recheck bmp next week- communicated this to the patient  She has appropriate follow up visits made  Subjective:     Claude Loh is a 67 y o  female here today with chief complaint below:  Chief Complaint   Patient presents with    Transition of Care Management     - CC above per clinical staff and reviewed  HPI:  Patient here with her  for TCM visit after her recent hospitalization for acute resp failure with hypoxia secondary to Covid-19  She had surgery just prior to the Covid-19 infection and notes that thankfully her foot pain is better than it's ever been  She has follow up appts set up  Dr Mago Schofield is going to have her get PFTs and try get her back using the Inspire device for her BARBARA  She had pulm visit earlier today  Breathing is feeling more back to normal   Cough resolved 3-4 days after discharge from the hospital   No fevers  Eating and drinking well  Doesn't feel SOB  Not having CP    BS has been well controlled  She notes that in the past she was told she should stop taking calcium, but the podiatrist requested that she re-start this  She'd like me to check w/ Dr Gauri Schmidt to be certain he would be ok with her starting since she thinks he had her stop it previously  She is also worried that her creatinine is closer to two  Asks me to check in with Dr Lavonne Mcburney office to see when he would want to recheck this          TCM Call     Date and time call was made  7/22/2022  2:47 PM    Hospital care reviewed  Records reviewed    Patient was hospitialized at  [address] [place] [address]    Date of Admission  07/20/22    Date of discharge  07/22/22    Diagnosis  Acute respiratory failure with hypoxia Disposition  Home    Were the patients medications reviewed and updated  Yes    Current Symptoms  None    Dizziness severity  Mild      TCM Call     Post hospital issues  None    Should patient be enrolled in anticoag monitoring? No    Scheduled for follow up? Yes    Patients specialists  Nephrologist    Referrals needed  Arianne Medina MA     Did you obtain your prescribed medications  Yes    Do you need help managing your prescriptions or medications  No    Is transportation to your appointment needed  No    I have advised the patient to call PCP with any new or worsening symptoms  Maria Del Carmen Mendes RN    Living Arrangements  Spouse or Significiant other    Support System  Partner; Family    The type of support provided  Physical; Emotional; Financial    Do you have social support  Yes, as much as I need    Are you recieving any outpatient services  No    Are you recieving home care services  No    Are you using any community resources  No    Current waiver services  No    Have you fallen in the last 12 months  No    Interperter language line needed  No          The following portions of the patient's history were reviewed and updated as appropriate: allergies, current medications, past family history, past medical history, past social history, past surgical history and problem list     ROS:  Review of Systems       Objective:      /72   Pulse 70   Temp 97 7 °F (36 5 °C)   Resp 18   Ht 5' 3" (1 6 m)   Wt 72 6 kg (160 lb)   SpO2 96%   BMI 28 34 kg/m²   BP Readings from Last 3 Encounters:   08/04/22 126/72   08/04/22 138/68   07/22/22 145/73     Wt Readings from Last 3 Encounters:   08/04/22 72 6 kg (160 lb)   08/04/22 72 6 kg (160 lb)   07/20/22 74 8 kg (165 lb)               Physical Exam:   Physical Exam  Vitals and nursing note reviewed  Constitutional:       Appearance: Normal appearance  She is well-developed  HENT:      Head: Normocephalic and atraumatic     Cardiovascular:      Rate and Rhythm: Normal rate and regular rhythm  Heart sounds: Normal heart sounds  No murmur heard  Pulmonary:      Effort: Pulmonary effort is normal  No respiratory distress  Breath sounds: Normal breath sounds  No wheezing  Musculoskeletal:      Cervical back: Neck supple  Lymphadenopathy:      Cervical: No cervical adenopathy  Skin:     General: Skin is warm and dry  Neurological:      Mental Status: She is alert and oriented to person, place, and time        Comments: Ambulating with rollator, walking boot in place   Psychiatric:         Mood and Affect: Mood normal          Behavior: Behavior normal

## 2022-08-04 NOTE — TELEPHONE ENCOUNTER
Myron Ayon had asked me at her appointment today to contact Dr Jason Darnell for her in regard to her lab work and some questions she had about taking calcium  You can let her know that he would like a repeat BMP next week and that he is fine with her taking calcium as her podiatrist had recommended

## 2022-08-04 NOTE — TELEPHONE ENCOUNTER
Spoke with patient about the following, she expressed understanding and thanked us for the call:    ----- Message from Emmett Hernandez MD sent at 8/4/2022  2:44 PM EDT -----  Regarding: FW: Patient has questions  Please order basic metabolic profile this time  Please have the patient take a week a blood pressure readings  Review all medications with her as well  Let her know the calcium is acceptable  ----- Message -----  From: Gretchen Nichols MD  Sent: 8/4/2022   2:24 PM EDT  To: Emmett Hernandez MD  Subject: RE: Patient has questions                        Thanks, Horatio Osler- She actually is looking and feeling well right now also  I'll give her the heads up about the repeat BMP and let her know she can take the calcium

## 2022-08-04 NOTE — LETTER
August 4, 2022     [facility]  [address]  [facility]    Patient: Shelly Owens   YOB: 1950   Date of Visit: 8/4/2022       Dear Dr Kory Mckeon: Thank you for referring Luke Tobias to me for evaluation  Below are my notes for this consultation  If you have questions, please do not hesitate to call me  I look forward to following your patient along with you  Sincerely,        Soraida Hood DO        CC: No Recipients  Soraida Hood DO  8/4/2022  5:19 PM  Sign when Signing Visit  Progress note - Pulmonary Medicine   Shelly Owens 67 y o  female MRN: [id number]       Impression & Plan:   71 y o  F with PMHx of DM, HTN, Hyperlipidemia and hiatal hernia s/p repair who comes in for follow up with recent respiratory failure requiring mechanical ventilation due to aspiration, BARBARA s/p UPPP on CPAP who comes in for Inspire initiation    1  Dyspnea on exertion and productive cough - recent COVID19 pneumonia and symptoms similar to obstructive lugn disease        - Check PFT to assess for obstructive lung disease        -  We may want to consider CT chest if she is still noting dyspnea on exertion         -  We will start albuterol MDI every 6 hrs      2  Moderate BARBARA (AHI - 19 5) who previously failed autoCPAP s/p implantation of Inspire on 11/10/21   Here for device activation       -  She is not currently using her device  We attempt to adjust her device today  We lowered stimulation level to 2 1 - 3 1  She will start at 2 3V  I encouraged her to use it       -  If she is still struggling we will adjust pulse width and frequency at next visit           -  We may want to consider an amnestic agent to see if this will help her with tolerance          -  I discussed in depth the risk of leaving sleep apnea untreated including hypertension, heart failure, arrhythmia, MI and stroke      3   RLS and significant neuropathy -  she states that this this is stable          -  Continue Neurontin 100mg PO at bedtime          -  Continue Mirapex 0 25 qHS   I renewed the dose today     ______________________________________________________________________    HPI:    Kali Odell presents today for follow-up for her BARBARA and in addition she had a recent hospitalization due to [place]  She has been slowly improving from her recent hospitalization  However, she still notes cough, wheezing and chest tightness  She states that she was hoping to get an inhaler to help her when she is noted wheezing and chest tightness  She has a productive cough that is persistent as well that is productive  She has not been using her inspire therapy for her BARBARA as she has difficulty with tolerance once it activates  Answers for HPI/ROS submitted by the patient on 2022  Do you have fatigue?: Yes  Which of the following makes your symptoms worse?: nothing  Which of the following makes your symptoms better?: rest        Review of Systems:  Review of Systems   Constitutional: Positive for appetite change and fatigue  HENT: Positive for postnasal drip  Eyes: Negative  Respiratory: Positive for stridor  Cardiovascular: Negative  Gastrointestinal: Negative  Endocrine: Negative  Genitourinary: Negative  Musculoskeletal: Negative  Skin: Negative  Neurological: Negative  Hematological: Negative  Psychiatric/Behavioral: Negative            Social history updates:  Social History     Tobacco Use   Smoking Status Former Smoker    Packs/day: 2 00    Years: 10 00    Pack years: 20 00    Types: Cigarettes    Start date: 1968   [facility] Quit date: 1976    Years since quittin 6   Smokeless Tobacco Never Used   Tobacco Comment    Smoked 2 pack a day     Social History     Socioeconomic History    Marital status: /Civil Union     Spouse name: Not on file    Number of children: Not on file    Years of education: Not on file   [employer] education level: Not on file   Occupational History    Occupation: RETIRED   Tobacco Use    Smoking status: Former Smoker     Packs/day: 2 00     Years: 10 00     Pack years: 20 00     Types: Cigarettes     Start date: 1968     Quit date: 1976     Years since quittin 6    Smokeless tobacco: Never Used    Tobacco comment: Smoked 2 pack a day   Vaping Use    Vaping Use: Never used   Substance and Sexual Activity    Alcohol use: Yes     Comment: 1 or 2 a year    Drug use: No    Sexual activity: Not Currently     Partners: Male     Birth control/protection: Abstinence, Post-menopausal, Female Sterilization     Comment: defer   Other Topics Concern    Not on file   Social History Narrative    Not on file     Social Determinants of Health     Financial Resource Strain: Not on file   Food Insecurity: Not on file   Transportation Needs: Not on file   Physical Activity: Not on file   Stress: Not on file   Social Connections: Not on file   Intimate Partner Violence: Not on file   Housing Stability: Not on file       PhysicalExamination:  Vitals:   /68 (BP Location: Right arm, Patient Position: Sitting, Cuff Size: Adult)   Pulse 64   Temp 97 7 °F (36 5 °C) (Tympanic)   Resp 18   Ht 5' 3" (1 6 m)   Wt 72 6 kg (160 lb)   SpO2 96%   BMI 28 34 kg/m²   General: Pleasant, Awake alert and oriented x 3, conversant without conversational dyspnea, NAD, normal affect  HEENT:  PERRL, Sclera noninjected, nonicteric OU, Nares patent,  no craniofacial abnormalities, Mucous membranes, moist, no oral lesions, normal dentition, Mallampati class 3  NECK: Trachea midline, no accessory muscle use, no stridor, no cervical or supraclavicular adenopathy, JVP not elevated  CARDIAC: Reg, single s1/S2, no m/r/g  PULM: CTA bilaterally no wheezing, rhonchi or rales  ABD: Normoactive bowel sounds, soft nontender, nondistended, no rebound, no rigidity, no guarding  EXT: No cyanosis, no clubbing, no edema, normal capillary refill  NEURO: no focal neurologic deficits, AAOx3, moving all extremities appropriately      Diagnostic Data:  Labs: I personally reviewed the most recent laboratory data pertinent to today's visit  I have personally reviewed pertinent lab results  Lab Results   Component Value Date    WBC 14 08 (H) 07/22/2022    HGB 10 6 (L) 07/22/2022    HCT 32 1 (L) 07/22/2022    MCV 89 07/22/2022     07/22/2022     Lab Results   Component Value Date    GLUCOSE 168 (H) 11/30/2018    CALCIUM 8 8 08/02/2022    K 4 2 08/02/2022    CO2 27 08/02/2022     08/02/2022    BUN 37 (H) 08/02/2022    CREATININE 1 96 (H) 08/02/2022     No results found for: IGE  Lab Results   Component Value Date    ALT 23 07/22/2022    AST 21 07/22/2022    ALKPHOS 81 07/22/2022          PFTs:  The most recent pulmonary function tests were reviewed  PFT with post bronchodilator 10/30/20  Study Interpretation:   · Reduced total lung capacity at 77% predicted with normal residual volume  · Normal airflow on vital capacity  · No significant improvement in airflow or vital capacity following the administration of bronchodilators  · Reduced diffusion capacity  · Normal airway resistance as indicated by the specific airway conductance      [place] in office 8/2/19  Prebronchodilator  FVC - 2 29 (79%)  FEV1 - 2 20 (83%)  FEV1/FVC - 76%     Post bronchodilator  FVC - 2 37 (82%)  FEV1 - 1 86 (84%)  FEV1/FVC -  78%  Very mild restriction      6 minute walk 8/2/19  Starting saturation - 94%   Starting HR - 74 bpm  Lowest saturation - 90%    Highest HR - 94 bpm  Ambulated - 252 m without the need for oxygen     Imaging  I personally reviewed the images on the [facility] system pertinent to today's visit  CT chest - 10/30/20  IMPRESSION:  Mild scarring/atelectasis in the lower lobes as mentioned above   No significant interstitial thickening or honeycombing or fibrotic changes are seen  No evidence of aspiration at this time    Hiatal hernia     Cardiac:  SUMMARY  LEFT VENTRICLE:  Systolic function was normal  Ejection fraction was estimated in the range of 55 %  Although no diagnostic regional wall motion abnormality was identified, this possibility cannot be completely excluded on the basis of this study  LEFT ATRIUM:  The atrium was dilated  MITRAL VALVE:  There was trace regurgitation      Sleep studies:  Diagnostic: 2011 -  AHI 13 1, severe hypoxia (sat 66%)     CPAP 5/8/19  Mrs Fuentes underwent a titration of CPAP to 10 cc of H2O pressure  She did well at 9 cc of H2O pressure in REM when in  the nonsupine position  However, she did not sleep much in the supine position  Therefore, I recommend a trial of autotitrating  CPAP 9 -20 cc of H2O pressure with Cflex 3 and heated humidification using a small Resmed Airfit F20  Compliance should be evaluated in 1-2 months  In addition, she had an increased number of limb movements (PLM index 35 7)  If she still has daytime sleepiness, I would  recommend pharmacologic treatment with Neurontin, Lyrica, Mirapex or Requip     Diagnostic Study with Inspire 3/24/22  IMPRESSION:     Mrs Fuentes came in for Inspire titration  Mrs Fuentes slept poorly with fragmented sleep  Sleep architecture demonstrated delaeyd sleep latency  She did not demonstrate any REM sleep during this study  She was initiated on Inspire at 2 3V and underwent titration of  to 2 5 V  While there were no events noted at this stimulation level, there was no REM sleep and sleep was very fragmented  Therefore, I recommend switching voltage range to 2 2 - 3 2 V and starting at level 3 (2 4V)  I encourage her to continue to advance the device to optimize sleep quality  I recommend a repeat home study to ensure apnea is controlled in 3-4 months  Diagnostic study 3/30/21  (AHI) of 19 5 events per hour of sleep   The AHI in the supine position was 21 6   The AHI during REM sleep was 69  5       Compliance Data:  Type of CPAP: CPAP 10 - 20, mean 10 5, peak 18 5                                   8/17/20 - 11/17/20                                   Percent usage: 97%, > 4 hrs 82%                                   Average time used: 5 hrs 51 mins, up to 9 hrs 52 mins                                  Time in large leak: 2 mins 12 seconds                                   Residual AHI: 5 7     Compliance Data:  Type of CPAP: CPAP 10 - 20, mean 10 2, peak 12 5                                   6/27/20 - 7/26/20                                   Percent usage: 50%, > 4 hrs 26%                                   Average time used: 2 hrs 29 mins, up to 9 hrs 2 mins                                  Time in large leak: 12 mins 16 seconds                                   Residual AHI: 3 2     Compliance Data:  Type of CPAP: CPAP 10 - 20                                    8/17/19 - 11/14/19, mean 10 5, Max 17 1                                   Percent usage: 100%, > 4 hrs 83%                                   Average time used: 5 hrs 48 mins, up to 9 hrs 27 mins                                  Time in large leak: 3 mins 8 seconds                                   Residual AHI: 6 5     Compliance Data:  Type of CPAP: CPAP 9 - 20 6/29/19 - 7/28/19, mean 10 2, Max 14 1                                   Percent usage: 100%                                   Average time used: 6 hrs 22 mins, up to 8 hrs 32 mins                                  Time in large leak: 32 seconds                                   Residual AHI: 4 9        Compliance Data:  Type of CPAP: CPAP 12 3/5/19 - 4/3/19                                   Percent usage: 100%                                   Average time used: 5 hrs 40 mins, up to 8 hrs 32 mins                                  Time in large leak: 7 mins                                   Residual AHI: 2 5        Compliance Data:  Type of CPAP:  10                                   Average time used: 2 hrs                                   Residual AHI: 2 2       Jocelyn Pool, DO

## 2022-08-04 NOTE — PROGRESS NOTES
Progress note - Pulmonary Medicine   Edgar Tavera 67 y o  female MRN: [id number]       Impression & Plan:   71 y o  F with PMHx of DM, HTN, Hyperlipidemia and hiatal hernia s/p repair who comes in for follow up with recent respiratory failure requiring mechanical ventilation due to aspiration, BARBARA s/p UPPP on CPAP who comes in for Inspire initiation    1  Dyspnea on exertion and productive cough - recent COVID19 pneumonia and symptoms similar to obstructive lugn disease        - Check PFT to assess for obstructive lung disease        -  We may want to consider CT chest if she is still noting dyspnea on exertion         -  We will start albuterol MDI every 6 hrs      2  Moderate BARBARA (AHI - 19 5) who previously failed autoCPAP s/p implantation of Inspire on 11/10/21   Here for device activation       -  She is not currently using her device  We attempt to adjust her device today  We lowered stimulation level to 2 1 - 3 1  She will start at 2 3V  I encouraged her to use it       -  If she is still struggling we will adjust pulse width and frequency at next visit  -  We may want to consider an amnestic agent to see if this will help her with tolerance          -  I discussed in depth the risk of leaving sleep apnea untreated including hypertension, heart failure, arrhythmia, MI and stroke      3   RLS and significant neuropathy -  she states that this this is stable          -  Continue Neurontin 100mg PO at bedtime          -  Continue Mirapex 0 25 qHS   I renewed the dose today     ______________________________________________________________________    HPI:    Edgar Tavera presents today for follow-up for her BARBARA and in addition she had a recent hospitalization due to [place]  She has been slowly improving from her recent hospitalization  However, she still notes cough, wheezing and chest tightness     She states that she was hoping to get an inhaler to help her when she is noted wheezing and chest tightness  She has a productive cough that is persistent as well that is productive  She has not been using her inspire therapy for her BARBARA as she has difficulty with tolerance once it activates  Answers for HPI/ROS submitted by the patient on 2022  Do you have fatigue?: Yes  Which of the following makes your symptoms worse?: nothing  Which of the following makes your symptoms better?: rest        Review of Systems:  Review of Systems   Constitutional: Positive for appetite change and fatigue  HENT: Positive for postnasal drip  Eyes: Negative  Respiratory: Positive for stridor  Cardiovascular: Negative  Gastrointestinal: Negative  Endocrine: Negative  Genitourinary: Negative  Musculoskeletal: Negative  Skin: Negative  Neurological: Negative  Hematological: Negative  Psychiatric/Behavioral: Negative            Social history updates:  Social History     Tobacco Use   Smoking Status Former Smoker    Packs/day: 2 00    Years: 10 00    Pack years: 20 00    Types: Cigarettes    Start date: 1968   [facility] Quit date: 1976    Years since quittin 6   Smokeless Tobacco Never Used   Tobacco Comment    Smoked 2 pack a day     Social History     Socioeconomic History    Marital status: /Civil Union     Spouse name: Not on file    Number of children: Not on file    Years of education: Not on file    Highest education level: Not on file   Occupational History    Occupation: RETIRED   Tobacco Use    Smoking status: Former Smoker     Packs/day: 2 00     Years: 10 00     Pack years: 20 00     Types: Cigarettes     Start date: 1968     Quit date: 1976     Years since quittin 6    Smokeless tobacco: Never Used    Tobacco comment: Smoked 2 pack a day   Vaping Use    Vaping Use: Never used   Substance and Sexual Activity    Alcohol use: Yes     Comment: 1 or 2 a year    Drug use: No    Sexual activity: Not Currently     Partners: Male     Birth control/protection: Abstinence, Post-menopausal, Female Sterilization     Comment: defer   Other Topics Concern    Not on file   Social History Narrative    Not on file     Social Determinants of Health     Financial Resource Strain: Not on file   Food Insecurity: Not on file   Transportation Needs: Not on file   Physical Activity: Not on file   Stress: Not on file   Social Connections: Not on file   Intimate Partner Violence: Not on file   Housing Stability: Not on file       PhysicalExamination:  Vitals:   /68 (BP Location: Right arm, Patient Position: Sitting, Cuff Size: Adult)   Pulse 64   Temp 97 7 °F (36 5 °C) (Tympanic)   Resp 18   Ht 5' 3" (1 6 m)   Wt 72 6 kg (160 lb)   SpO2 96%   BMI 28 34 kg/m²   General: Pleasant, Awake alert and oriented x 3, conversant without conversational dyspnea, NAD, normal affect  HEENT:  PERRL, Sclera noninjected, nonicteric OU, Nares patent,  no craniofacial abnormalities, Mucous membranes, moist, no oral lesions, normal dentition, Mallampati class 3  NECK: Trachea midline, no accessory muscle use, no stridor, no cervical or supraclavicular adenopathy, JVP not elevated  CARDIAC: Reg, single s1/S2, no m/r/g  PULM: CTA bilaterally no wheezing, rhonchi or rales  ABD: Normoactive bowel sounds, soft nontender, nondistended, no rebound, no rigidity, no guarding  EXT: No cyanosis, no clubbing, no edema, normal capillary refill  NEURO: no focal neurologic deficits, AAOx3, moving all extremities appropriately      Diagnostic Data:  Labs: I personally reviewed the most recent laboratory data pertinent to today's visit  I have personally reviewed pertinent lab results    Lab Results   Component Value Date    WBC 14 08 (H) 07/22/2022    HGB 10 6 (L) 07/22/2022    HCT 32 1 (L) 07/22/2022    MCV 89 07/22/2022     07/22/2022     Lab Results   Component Value Date    GLUCOSE 168 (H) 11/30/2018    CALCIUM 8 8 08/02/2022    K 4 2 08/02/2022    CO2 27 08/02/2022     08/02/2022    BUN 37 (H) 08/02/2022    CREATININE 1 96 (H) 08/02/2022     No results found for: IGE  Lab Results   Component Value Date    ALT 23 07/22/2022    AST 21 07/22/2022    ALKPHOS 81 07/22/2022          PFTs:  The most recent pulmonary function tests were reviewed  PFT with post bronchodilator 10/30/20  Study Interpretation:   · Reduced total lung capacity at 77% predicted with normal residual volume  · Normal airflow on vital capacity  · No significant improvement in airflow or vital capacity following the administration of bronchodilators  · Reduced diffusion capacity  · Normal airway resistance as indicated by the specific airway conductance      [place] in office 8/2/19  Prebronchodilator  FVC - 2 29 (79%)  FEV1 - 2 20 (83%)  FEV1/FVC - 76%     Post bronchodilator  FVC - 2 37 (82%)  FEV1 - 1 86 (84%)  FEV1/FVC -  78%  Very mild restriction      6 minute walk 8/2/19  Starting saturation - 94%   Starting HR - 74 bpm  Lowest saturation - 90%    Highest HR - 94 bpm  Ambulated - 252 m without the need for oxygen     Imaging  I personally reviewed the images on the [facility] system pertinent to today's visit  CT chest - 10/30/20  IMPRESSION:  Mild scarring/atelectasis in the lower lobes as mentioned above   No significant interstitial thickening or honeycombing or fibrotic changes are seen  No evidence of aspiration at this time  Hiatal hernia     Cardiac:  SUMMARY  LEFT VENTRICLE:  Systolic function was normal  Ejection fraction was estimated in the range of 55 %  Although no diagnostic regional wall motion abnormality was identified, this possibility cannot be completely excluded on the basis of this study  LEFT ATRIUM:  The atrium was dilated  MITRAL VALVE:  There was trace regurgitation      Sleep studies:  Diagnostic: 2011 -  AHI 13 1, severe hypoxia (sat 66%)     CPAP 5/8/19  Mrs Fuentes underwent a titration of CPAP to 10 cc of H2O pressure   She did well at 9 cc of H2O pressure in REM when in  the nonsupine position  However, she did not sleep much in the supine position  Therefore, I recommend a trial of autotitrating  CPAP 9 -20 cc of H2O pressure with Cflex 3 and heated humidification using a small Resmed Airfit F20  Compliance should be evaluated in 1-2 months  In addition, she had an increased number of limb movements (PLM index 35 7)  If she still has daytime sleepiness, I would  recommend pharmacologic treatment with Neurontin, Lyrica, Mirapex or Requip     Diagnostic Study with Inspire 3/24/22  IMPRESSION:     Mrs Fuentes came in for Inspire titration  Mrs Fuentes slept poorly with fragmented sleep  Sleep architecture demonstrated delaeyd sleep latency  She did not demonstrate any REM sleep during this study  She was initiated on Inspire at 2 3V and underwent titration of  to 2 5 V  While there were no events noted at this stimulation level, there was no REM sleep and sleep was very fragmented  Therefore, I recommend switching voltage range to 2 2 - 3 2 V and starting at level 3 (2 4V)  I encourage her to continue to advance the device to optimize sleep quality  I recommend a repeat home study to ensure apnea is controlled in 3-4 months  Diagnostic study 3/30/21  (AHI) of 19 5 events per hour of sleep   The AHI in the supine position was 21 6   The AHI during REM sleep was 69  5       Compliance Data:  Type of CPAP: CPAP 10 - 20, mean 10 5, peak 18 5                                   8/17/20 - 11/17/20                                   Percent usage: 97%, > 4 hrs 82%                                   Average time used: 5 hrs 51 mins, up to 9 hrs 52 mins                                  Time in large leak: 2 mins 12 seconds                                   Residual AHI: 5 7     Compliance Data:  Type of CPAP: CPAP 10 - 20, mean 10 2, peak 12 5                                   6/27/20 - 7/26/20                                   Percent usage: 50%, > 4 hrs 26%                                   Average time used: 2 hrs 29 mins, up to 9 hrs 2 mins                                  Time in large leak: 12 mins 16 seconds                                   Residual AHI: 3 2     Compliance Data:  Type of CPAP: CPAP 10 - 20 8/17/19 - 11/14/19, mean 10 5, Max 17 1                                   Percent usage: 100%, > 4 hrs 83%                                   Average time used: 5 hrs 48 mins, up to 9 hrs 27 mins                                  Time in large leak: 3 mins 8 seconds                                   Residual AHI: 6 5     Compliance Data:  Type of CPAP: CPAP 9 - 20 6/29/19 - 7/28/19, mean 10 2, Max 14 1                                   Percent usage: 100%                                   Average time used: 6 hrs 22 mins, up to 8 hrs 32 mins                                  Time in large leak: 32 seconds                                   Residual AHI: 4 9        Compliance Data:  Type of CPAP: CPAP 12 3/5/19 - 4/3/19                                   Percent usage: 100%                                   Average time used: 5 hrs 40 mins, up to 8 hrs 32 mins                                  Time in large leak: 7 mins                                   Residual AHI: 2 5        Compliance Data:  Type of CPAP:  10                                   Average time used: 2 hrs                                   Residual AHI: 2 2       Shukri McClure, DO

## 2022-08-05 ENCOUNTER — OFFICE VISIT (OUTPATIENT)
Dept: GASTROENTEROLOGY | Facility: AMBULARY SURGERY CENTER | Age: 72
End: 2022-08-05
Payer: MEDICARE

## 2022-08-05 VITALS
HEIGHT: 63 IN | RESPIRATION RATE: 18 BRPM | DIASTOLIC BLOOD PRESSURE: 74 MMHG | WEIGHT: 165 LBS | OXYGEN SATURATION: 97 % | SYSTOLIC BLOOD PRESSURE: 126 MMHG | BODY MASS INDEX: 29.23 KG/M2 | HEART RATE: 73 BPM

## 2022-08-05 DIAGNOSIS — K21.9 GASTROESOPHAGEAL REFLUX DISEASE, UNSPECIFIED WHETHER ESOPHAGITIS PRESENT: ICD-10-CM

## 2022-08-05 PROCEDURE — 99214 OFFICE O/P EST MOD 30 MIN: CPT | Performed by: PHYSICIAN ASSISTANT

## 2022-08-05 RX ORDER — PANTOPRAZOLE SODIUM 40 MG/1
40 TABLET, DELAYED RELEASE ORAL DAILY
Qty: 90 TABLET | Refills: 2 | Status: SHIPPED | OUTPATIENT
Start: 2022-08-05 | End: 2022-09-23 | Stop reason: SDUPTHER

## 2022-08-05 RX ORDER — FAMOTIDINE 10 MG
20 TABLET ORAL
Qty: 180 TABLET | Refills: 2 | Status: SHIPPED | OUTPATIENT
Start: 2022-08-05

## 2022-08-05 NOTE — PROGRESS NOTES
Follow-up Note -  [facility]  Shelly Graciela 1950 67 y o  female         Reason:  Follow-up; GERD, gastroparesis, history of colon polyps    HPI:  76-year-old female with history of type 2 diabetes, chronic kidney disease, gastroparesis who presents for follow-up  She had EGD and Botox injection into the pylorus, her 2nd such treatment, in May 2022, the examination noted a 3 cm hiatal hernia and a small diverticulum in the lower esophagus, with severe gastritis and retained food in the stomach  Patient was recommended to continue Protonix 40 mg daily and Pepcid 40 mg, at this time the patient says she is actually feeling generally well, she thinks the pyloric Botox injection continues to be helpful, her previous injection was done 8 months prior to the 1 in May and she said she had about 6-7 months of relief after that 1  She says that when she was having symptoms it was typically heartburn, gassiness and abdominal discomfort without actual vomiting  Endorses only occasional mild dysphagia to solid foods but otherwise no consistent difficulties or discomfort with swallowing  She says she still takes Protonix in the morning and Pepcid in the evening, she also takes MiraLax once daily for chronic constipation and this has been helpful as well  Her last colonoscopy was done in October 2021 seeing the removal of 7 polyps, for which she was recommended surveillance colonoscopy in 3 years from that time  REVIEW OF SYSTEMS:      CONSTITUTIONAL: Denies any fever, chills, or rigors  Good appetite, and no recent weight loss  HEENT: No earache or tinnitus  Denies hearing loss or visual disturbances  CARDIOVASCULAR: No chest pain or palpitations  RESPIRATORY: Denies any cough, hemoptysis, shortness of breath or dyspnea on exertion  GASTROINTESTINAL: As noted in the History of Present Illness  GENITOURINARY: No problems with urination  Denies any hematuria or dysuria    NEUROLOGIC: No dizziness or vertigo, denies headaches  MUSCULOSKELETAL: Denies any muscle or joint pain  SKIN: Denies skin rashes or itching  ENDOCRINE: Denies excessive thirst  Denies intolerance to heat or cold  PSYCHOSOCIAL: Denies depression or anxiety  Denies any recent memory loss  Past Medical History:   Diagnosis Date    Allergic     Allergic rhinitis     Anesthesia     pt reports "had to use double lumen endo tube for hiatal hernia repair/so surgeon could get to where he needed to work"    Arthritis     Aspiration into airway     Basal cell carcinoma 2007    left cheek     BCC (basal cell carcinoma) 05/27/2021    Left Nasal tip    Cancer [facility] [address] )     squamous cell cancer on forhead    [facility]     basal cell on nose     Chronic kidney disease 2000, 2018    Stones, kidney disease stage 4    Chronic pain disorder     bilat feet and joint pain on occas    Colon polyp     Constipation     COPD [facility] obstructive [facility] disease[facility]     COVID-19 08/2021    recovered at home/did receive monoclonal infusion    Dental bridge present     Depression     Diabetes mellitus [facility]     Type 2    Diabetic neuropathy [facility]     Disease of thyroid gland     Family history of thyroid problem     Fatty liver     GERD (gastroesophageal reflux disease)     Heart burn     Hiatal hernia     History of pneumonia     Hyperlipidemia     Hyperplasia, parathyroid ([facility]     Hypertension     Kidney problem     Kidney stone     Memory loss Julu2 2020    Motion sickness     Motion sickness     Neck pain     Obesity 1978    Obesity (BMI 30 0-34  9)     Pollen allergies     RLS (restless legs syndrome)     SCC (squamous cell carcinoma) 05/04/2021    left mid forehead    Seasonal allergies     Sleep apnea     has inspire    Squamous cell skin cancer 2007    left cheek     Swollen ankles     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, left     great toe fusion    Urinary incontinence     Urinary tract infection 03/28/2022    Wears glasses       Past Surgical History:   Procedure Laterality Date    ARTHROSCOPY KNEE      BREAST BIOPSY      stereotactic-benign    BREAST BIOPSY      stereo-benign    BREAST EXCISIONAL BIOPSY      unknown date-benign    BREAST EXCISIONAL BIOPSY      unknown date-benign    BREAST EXCISIONAL BIOPSY      unknown date-benign    BREAST EXCISIONAL BIOPSY      unknown age-benign    CARDIAC CATHETERIZATION      CHOLECYSTECTOMY      COLONOSCOPY      EXAMINATION UNDER ANESTHESIA N/A 06/24/2021    Procedure: EXAM UNDER ANESTHESIA (EUA), DISE;  Surgeon: Tristan Abebe MD;  [facility];  Service: ENT    HERNIA REPAIR      HIATAL HERNIA REPAIR      KNEE SURGERY      Torn maniscus lap surg    LIPOSUCTION      LYMPH NODE BIOPSY      MOHS SURGERY  05/20/2021    left mid forehead-Gautam    MOHS SURGERY Left 05/27/2021    Left nasal tip- gautam     LA FOOT/TOES SURGERY PROC UNLISTED Right 7/19/2022    Procedure: 1st metatarsal phalangeal joint fusion;  Surgeon: Samantha Liz DPM;  Location[facility];  Service: Podiatry    LA INCISION IMPLANT CRANIAL NERVE STIM ELECTRODE/PULSE GEN N/A 11/10/2021    Procedure: INSERTION UPPER AIRWAY STIMULATOR, INSPIRE IMPLANT;  Surgeon: Tristan Abebe MD;  [facility];  Service: ENT    REDUCTION MAMMAPLASTY Bilateral 2000    REDUCTION MAMMAPLASTY      SKIN BIOPSY      SKIN CANCER EXCISION  2007    squamous cell carcinoma     SKIN CANCER EXCISION  2007    basal cell carcinoma    SQUAMOUS CELL CARCINOMA EXCISION      TOE SURGERY      TONSILLECTOMY      TUBAL LIGATION      UPPER GASTROINTESTINAL ENDOSCOPY      US GUIDED BREAST BIOPSY RIGHT COMPLETE Right 7/18/2022     Social History     Socioeconomic History    Marital status: /Civil Union     Spouse name: Not on file    Number of children: Not on file    Years of education: Not on file    Highest education level: Not on file   Occupational History    Occupation: RETIRED   Tobacco Use    Smoking status: Former Smoker     Packs/day: 2 00     Years: 10 00     Pack years: 20 00     Types: Cigarettes     Start date: 1968     Quit date: 1976     Years since quittin 6    Smokeless tobacco: Never Used    Tobacco comment: Smoked 2 pack a day   Vaping Use    Vaping Use: Never used   Substance and Sexual Activity    Alcohol use: Yes     Comment: 1 or 2 a year    Drug use: No    Sexual activity: Not Currently     Partners: Male     Birth control/protection: Abstinence, Post-menopausal, Female Sterilization     Comment: defer   Other Topics Concern    Not on file   Social History Narrative    Not on file     Social Determinants of Health     Financial Resource Strain: Not on file   Food Insecurity: Not on file   Transportation Needs: Not on file   Physical Activity: Not on file   Stress: Not on file   Social Connections: Not on file   Intimate Partner Violence: Not on file   Housing Stability: Not on file     Family History   Problem Relation Age of Onset    Heart disease Mother     Depression Mother     Hypertension Mother     COPD Mother     Hearing loss Mother     Anxiety disorder Mother     Heart disease Father     Lung cancer Father 79        Smoker     Cancer Father         brain    Alcohol abuse Father     Dementia Father     Hypertension Father     Thyroid disease Father     COPD Father     Arthritis Father     Brain cancer Father 76    Hypertension Sister     Diabetes Sister     Heart disease Sister     Thyroid disease Sister     Cancer Sister         Lympoma, lung    Lung cancer Sister 78    Anxiety disorder Sister     Hypertension Brother     Diabetes Brother     Cancer Brother         Throat    Dementia Brother     Stroke Brother     Hypertension Brother     Heart disease Brother     Diabetes Brother     No Known Problems Son     No Known Problems Son     Brain cancer Paternal Aunt         unknown age   Mavis Cousin Breast cancer Neg Hx      Sulfamethoxazole-trimethoprim and Ciprofloxacin  Current Outpatient Medications   Medication Sig Dispense Refill    acetaminophen (TYLENOL) 325 mg tablet Take 2 tablets (650 mg total) by mouth every 6 (six) hours as needed for mild pain, headaches or fever  0    albuterol (Ventolin HFA) 90 mcg/act inhaler Inhale 2 puffs every 6 (six) hours as needed for wheezing 54 g 0    amLODIPine (NORVASC) 5 mg tablet TAKE 1 TABLET (5 MG TOTAL) BY MOUTH DAILY  90 tablet 0    b complex vitamins capsule Take 1 capsule by mouth daily      B-D ULTRAFINE III SHORT PEN 31G X 8 MM MISC USE AS DIRECTED 4 TIMES PER DAY  3    BIOTIN PO Take by mouth      DULoxetine HCl (CYMBALTA PO) Take 90 mg by mouth in the morning      famotidine (PEPCID) 10 mg tablet Take 2 tablets (20 mg total) by mouth daily at bedtime 180 tablet 2    fluticasone (FLONASE) 50 mcg/act nasal spray 1-2 sprays into each nostril daily (Patient taking differently: 1-2 sprays into each nostril if needed) 18 2 mL 5    gabapentin (NEURONTIN) 100 mg capsule Take 100 mg by mouth daily      insulin aspart (NovoLOG) 100 units/mL injection Inject under the skin 3 (three) times a day before meals 16 units, 16 units, 25 units        insulin detemir (Levemir FlexTouch) 100 Units/mL injection pen Inject 50 Units under the skin every evening       Krill Oil (Omega-3) 500 MG CAPS Take 1,600 mg by mouth daily      levothyroxine 112 mcg tablet Take 112 mcg by mouth every morning      metoprolol tartrate (LOPRESSOR) 50 mg tablet TAKE 1 TABLET (50 MG TOTAL) BY MOUTH EVERY 12 (TWELVE) HOURS 180 tablet 3    olmesartan (BENICAR) 20 mg tablet Take 0 5 tablets (10 mg total) by mouth 2 (two) times a day 90 tablet 3    pantoprazole (PROTONIX) 40 mg tablet Take 1 tablet (40 mg total) by mouth daily 90 tablet 2    pramipexole (MIRAPEX) 0 25 mg tablet Take 1 tablet (0 25 mg total) by mouth daily at bedtime 30 tablet 3    Probiotic Product (PROBIOTIC PO) Take 1 capsule by mouth 2 (two) times a day      torsemide (DEMADEX) 20 mg tablet TAKE 1 TABLET BY MOUTH EVERY DAY 90 tablet 3    Vitamin D, Ergocalciferol, 2000 units CAPS Take 2,000 Units by mouth daily       rosuvastatin (CRESTOR) 20 MG tablet Take 1 tablet (20 mg total) by mouth daily 30 tablet 0     No current facility-administered medications for this visit  Blood pressure 126/74, pulse 73, resp  rate 18, height 5' 3" (1 6 m), weight 74 8 kg (165 lb), SpO2 97 %  PHYSICAL EXAM:      General Appearance:   Alert, cooperative, no distress, appears stated age    HEENT:   Normocephalic, atraumatic, anicteric      Neck:  Supple, symmetrical, trachea midline, no adenopathy;    thyroid: no enlargement/tenderness/nodules; no carotid  bruit or JVD    Lungs:   Clear to auscultation bilaterally; no rales, rhonchi or wheezing; respirations unlabored    Heart[de-identified]   S1 and S2 normal; regular rate and rhythm; no murmur, rub, or gallop  Abdomen:   Soft, non-tender, non-distended; normal bowel sounds; no masses, no organomegaly    Extremities: No edema, erythema, wounds, rashes   Rectal:   Deferred                      Lab Results   Component Value Date    WBC 14 08 (H) 07/22/2022    HGB 10 6 (L) 07/22/2022    HCT 32 1 (L) 07/22/2022    MCV 89 07/22/2022     07/22/2022     Lab Results   Component Value Date    GLUCOSE 168 (H) 11/30/2018    CALCIUM 8 8 08/02/2022    K 4 2 08/02/2022    CO2 27 08/02/2022     08/02/2022    BUN 37 (H) 08/02/2022    CREATININE 1 96 (H) 08/02/2022     Lab Results   Component Value Date    ALT 23 07/22/2022    AST 21 07/22/2022    ALKPHOS 81 07/22/2022     Lab Results   Component Value Date    INR 0 93 07/20/2022    INR 0 94 05/17/2021    INR 0 96 06/19/2020    PROTIME 12 4 07/20/2022    PROTIME 12 6 05/17/2021    PROTIME 12 2 06/19/2020       XR chest 1 view portable    Result Date: 7/20/2022  Impression: No acute cardiopulmonary disease   Findings are stable Workstation performed: [id number]     XR foot 3+ vw right    Result Date: 7/21/2022  Impression: No acute findings  Stable postop changes at the 1st MTP joint with orthopedic hardware appearing intact Workstation performed: [id number]     NM lung perfusion imaging (particulate)    Result Date: 7/20/2022  Impression: Low probability of pulmonary embolism  Workstation performed: [id number]       ASSESSMENT & PLAN:    GERD (gastroesophageal reflux disease)  With known diabetic gastroparesis, patient appears to have responded well to pyloric Botox injection, her 2nd such treatment, in May of this year  GERD symptomatology well managed with ppi/H2 blocker therapy  Constipation appears controlled with daily MiraLax      Has history of colon polyps, and will be due for surveillance in 2024       -plan for colonoscopy in 2024, barring development of any alarm symptomatology in the meantime     -continue small frequent meals, advised patient again about gastroparesis diet, and importance of follow-up with her endocrinologist for adequate control of her diabetes     -continue Protonix and Pepcid each once daily at this time     -continue MiraLax regularly for constipation    -advised patient to let us know if she experiences any worsening or return of her GI symptoms, we can follow-up in 1 year or sooner on an as needed basis

## 2022-08-05 NOTE — ASSESSMENT & PLAN NOTE
With known diabetic gastroparesis, patient appears to have responded well to pyloric Botox injection, her 2nd such treatment, in May of this year  GERD symptomatology well managed with ppi/H2 blocker therapy  Constipation appears controlled with daily MiraLax      Has history of colon polyps, and will be due for surveillance in 2024       -plan for colonoscopy in 2024, barring development of any alarm symptomatology in the meantime     -continue small frequent meals, advised patient again about gastroparesis diet, and importance of follow-up with her endocrinologist for adequate control of her diabetes     -continue Protonix and Pepcid each once daily at this time     -continue MiraLax regularly for constipation    -advised patient to let us know if she experiences any worsening or return of her GI symptoms, we can follow-up in 1 year or sooner on an as needed basis

## 2022-08-07 NOTE — TELEPHONE ENCOUNTER
Please make sure that she has a basic metabolic profile this week  Also a week a blood pressures   Thank you

## 2022-08-10 ENCOUNTER — PATIENT OUTREACH (OUTPATIENT)
Dept: FAMILY MEDICINE CLINIC | Facility: CLINIC | Age: 72
End: 2022-08-10

## 2022-08-10 NOTE — PROGRESS NOTES
Chart reviewed  I called patient and introduced myself, the role of OPCM RN and the complex case management program  Patient states she is independent of all ADL's and ambulates without assistance and sometimes uses a Rollator  She states she has no issues obtaining medications and can afford all of them  She states she is on Isulin for her diabetes but gets the Insulin and all supplies free through a Patient Assistance Program  She also has a continuous glucose monitor she changes every 10 days  She states she adheres to her diabetic diet and declines any teaching or information to be sent out  She follows with Dr Aditya Modi Endocrinology  She states her  helps her with anything she needs and provides all transportation to appointments  He also helps with her medication management  Patient declines any follow up at this time  The CCM episode was closed

## 2022-08-11 ENCOUNTER — APPOINTMENT (OUTPATIENT)
Dept: LAB | Facility: AMBULARY SURGERY CENTER | Age: 72
End: 2022-08-11
Payer: MEDICARE

## 2022-08-11 ENCOUNTER — OFFICE VISIT (OUTPATIENT)
Dept: FAMILY MEDICINE CLINIC | Facility: CLINIC | Age: 72
End: 2022-08-11
Payer: MEDICARE

## 2022-08-11 VITALS
DIASTOLIC BLOOD PRESSURE: 74 MMHG | OXYGEN SATURATION: 98 % | BODY MASS INDEX: 28.35 KG/M2 | HEART RATE: 67 BPM | RESPIRATION RATE: 18 BRPM | HEIGHT: 63 IN | WEIGHT: 160 LBS | SYSTOLIC BLOOD PRESSURE: 126 MMHG | TEMPERATURE: 98.7 F

## 2022-08-11 DIAGNOSIS — N18.4 STAGE 4 CHRONIC KIDNEY DISEASE (HCC): ICD-10-CM

## 2022-08-11 DIAGNOSIS — R30.0 DYSURIA: Primary | ICD-10-CM

## 2022-08-11 DIAGNOSIS — R32 URINARY INCONTINENCE, UNSPECIFIED TYPE: ICD-10-CM

## 2022-08-11 DIAGNOSIS — I10 ESSENTIAL HYPERTENSION: ICD-10-CM

## 2022-08-11 DIAGNOSIS — R35.0 URINARY FREQUENCY: ICD-10-CM

## 2022-08-11 LAB
ANION GAP SERPL CALCULATED.3IONS-SCNC: 6 MMOL/L (ref 4–13)
BACTERIA UR QL AUTO: ABNORMAL /HPF
BILIRUB UR QL STRIP: NEGATIVE
BUN SERPL-MCNC: 28 MG/DL (ref 5–25)
CALCIUM SERPL-MCNC: 9.1 MG/DL (ref 8.3–10.1)
CHLORIDE SERPL-SCNC: 106 MMOL/L (ref 96–108)
CLARITY UR: ABNORMAL
CO2 SERPL-SCNC: 29 MMOL/L (ref 21–32)
COLOR UR: YELLOW
CREAT SERPL-MCNC: 1.78 MG/DL (ref 0.6–1.3)
GFR SERPL CREATININE-BSD FRML MDRD: 27 ML/MIN/1.73SQ M
GLUCOSE P FAST SERPL-MCNC: 128 MG/DL (ref 65–99)
GLUCOSE UR STRIP-MCNC: NEGATIVE MG/DL
HGB UR QL STRIP.AUTO: ABNORMAL
KETONES UR STRIP-MCNC: NEGATIVE MG/DL
LEUKOCYTE ESTERASE UR QL STRIP: ABNORMAL
NITRITE UR QL STRIP: NEGATIVE
NON-SQ EPI CELLS URNS QL MICRO: ABNORMAL /HPF
PH UR STRIP.AUTO: 6.5 [PH]
POTASSIUM SERPL-SCNC: 3.9 MMOL/L (ref 3.5–5.3)
PROT UR STRIP-MCNC: ABNORMAL MG/DL
RBC #/AREA URNS AUTO: ABNORMAL /HPF
SL AMB  POCT GLUCOSE, UA: ABNORMAL
SL AMB LEUKOCYTE ESTERASE,UA: ABNORMAL
SL AMB POCT BILIRUBIN,UA: ABNORMAL
SL AMB POCT BLOOD,UA: ABNORMAL
SL AMB POCT CLARITY,UA: ABNORMAL
SL AMB POCT COLOR,UA: YELLOW
SL AMB POCT KETONES,UA: ABNORMAL
SL AMB POCT NITRITE,UA: ABNORMAL
SL AMB POCT PH,UA: 6
SL AMB POCT SPECIFIC GRAVITY,UA: 1.02
SL AMB POCT URINE PROTEIN: ABNORMAL
SL AMB POCT UROBILINOGEN: ABNORMAL
SODIUM SERPL-SCNC: 141 MMOL/L (ref 135–147)
SP GR UR STRIP.AUTO: 1.01 (ref 1–1.03)
UROBILINOGEN UR STRIP-ACNC: <2 MG/DL
WBC #/AREA URNS AUTO: ABNORMAL /HPF
WBC CLUMPS # UR AUTO: PRESENT /UL

## 2022-08-11 PROCEDURE — 99214 OFFICE O/P EST MOD 30 MIN: CPT | Performed by: FAMILY MEDICINE

## 2022-08-11 PROCEDURE — 36415 COLL VENOUS BLD VENIPUNCTURE: CPT

## 2022-08-11 PROCEDURE — 87077 CULTURE AEROBIC IDENTIFY: CPT | Performed by: FAMILY MEDICINE

## 2022-08-11 PROCEDURE — 87186 SC STD MICRODIL/AGAR DIL: CPT | Performed by: FAMILY MEDICINE

## 2022-08-11 PROCEDURE — 81001 URINALYSIS AUTO W/SCOPE: CPT | Performed by: FAMILY MEDICINE

## 2022-08-11 PROCEDURE — 81003 URINALYSIS AUTO W/O SCOPE: CPT | Performed by: FAMILY MEDICINE

## 2022-08-11 PROCEDURE — 80048 BASIC METABOLIC PNL TOTAL CA: CPT

## 2022-08-11 PROCEDURE — 87086 URINE CULTURE/COLONY COUNT: CPT | Performed by: FAMILY MEDICINE

## 2022-08-11 RX ORDER — CALCIUM CARBONATE 200(500)MG
1 TABLET,CHEWABLE ORAL 2 TIMES DAILY
COMMUNITY

## 2022-08-11 RX ORDER — DOXYCYCLINE 100 MG/1
100 TABLET ORAL 2 TIMES DAILY
Qty: 14 TABLET | Refills: 0 | Status: SHIPPED | OUTPATIENT
Start: 2022-08-11 | End: 2022-08-18

## 2022-08-11 NOTE — ASSESSMENT & PLAN NOTE
Lab Results   Component Value Date    EGFR 27 08/11/2022    EGFR 24 08/02/2022    EGFR 26 07/25/2022    CREATININE 1 78 (H) 08/11/2022    CREATININE 1 96 (H) 08/02/2022    CREATININE 1 85 (H) 07/25/2022

## 2022-08-11 NOTE — PROGRESS NOTES
Assessment/Plan:  Problem List Items Addressed This Visit        Cardiovascular and Mediastinum    Essential hypertension     Stable  Check blood pressure outside of office  Recommend lifestyle modifications  Genitourinary    Stage 4 chronic kidney disease ([facility] [address] )     Lab Results   Component Value Date    EGFR 27 08/11/2022    EGFR 24 08/02/2022    EGFR 26 07/25/2022    CREATININE 1 78 (H) 08/11/2022    CREATININE 1 96 (H) 08/02/2022    CREATININE 1 85 (H) 07/25/2022               Other    Urinary incontinence     Overdue for Uro F/U  Other Visit Diagnoses     Dysuria    -  Primary    Relevant Medications    doxycycline (ADOXA) 100 MG tablet    Other Relevant Orders    POCT urine dip auto non-scope    Urine culture    Urinalysis with microscopic (Completed)    Pending labs  Patient unable to take Pyridium due to CKD  Start Doxycycline 100mg BID x 7 days  Push fluids  Urinary frequency        Relevant Medications    doxycycline (ADOXA) 100 MG tablet    Other Relevant Orders    POCT urine dip auto non-scope    Urine culture    Urinalysis with microscopic (Completed)    See above  Return if symptoms worsen or fail to improve  Future Appointments   Date Time Provider [place]   8/23/2022 10:30 AM AN [facility] ROOM 01 AN Pulm Dia AN HOSP [facility]   9/15/2022 11:20 [facility]   10/4/2022 10:00 AM Rashida Rosen PA-C NEPH [facility]   10/17/2022 11:00 AM Marlon Benedict MD SPA [facility] E[facility]  SPA        Subjective:     Juice Agudelo is a 67 y o  female who presents today for a follow-up on her acute medical conditions  HPI:  Chief Complaint   Patient presents with    Urinary Tract Infection     Pt  C/o uti symptoms     -- Above per clinical staff and reviewed  --    HPI      Today:      Urinary Symptoms - Occurring x 1 day  +Dysuria, urinary frequency, urgency  Last UTI 6/30/22 +Enterobacter, Rx Doxycycline c benefit    She states she has 3 UTI's in the past 6 month  Last saw Kin Parikh 2021  +Fluids  The following portions of the patient's history were reviewed and updated as appropriate: allergies, current medications, past family history, past medical history, past social history, past surgical history and problem list       Review of Systems   Constitutional: Positive for fatigue  Negative for appetite change, chills, diaphoresis and fever  Respiratory: Negative for chest tightness and shortness of breath  Cardiovascular: Negative for chest pain  Gastrointestinal: Negative for abdominal pain, blood in stool, diarrhea, nausea and vomiting  Genitourinary: Positive for dysuria, frequency and urgency  Negative for flank pain, hematuria and vaginal discharge  Current Outpatient Medications   Medication Sig Dispense Refill    acetaminophen (TYLENOL) 325 mg tablet Take 2 tablets (650 mg total) by mouth every 6 (six) hours as needed for mild pain, headaches or fever  0    albuterol (Ventolin HFA) 90 mcg/act inhaler Inhale 2 puffs every 6 (six) hours as needed for wheezing 54 g 0    amLODIPine (NORVASC) 5 mg tablet TAKE 1 TABLET (5 MG TOTAL) BY MOUTH DAILY   90 tablet 0    b complex vitamins capsule Take 1 capsule by mouth daily      B-D ULTRAFINE III SHORT PEN 31G X 8 MM MISC USE AS DIRECTED 4 TIMES PER DAY  3    BIOTIN PO Take by mouth      calcium carbonate (TUMS) 500 mg chewable tablet Chew 1 tablet 2 (two) times a day      doxycycline (ADOXA) 100 MG tablet Take 1 tablet (100 mg total) by mouth 2 (two) times a day for 7 days 14 tablet 0    DULoxetine HCl (CYMBALTA PO) Take 90 mg by mouth in the morning      famotidine (PEPCID) 10 mg tablet Take 2 tablets (20 mg total) by mouth daily at bedtime 180 tablet 2    fluticasone (FLONASE) 50 mcg/act nasal spray 1-2 sprays into each nostril daily (Patient taking differently: 1-2 sprays into each nostril if needed) 18 2 mL 5    gabapentin (NEURONTIN) 100 mg capsule Take 100 mg by mouth daily      insulin aspart (NovoLOG) 100 units/mL injection Inject under the skin 3 (three) times a day before meals 16 units, 16 units, 25 units   Krill Oil (Omega-3) 500 MG CAPS Take 1,600 mg by mouth daily      levothyroxine 112 mcg tablet Take 112 mcg by mouth every morning      metoprolol tartrate (LOPRESSOR) 50 mg tablet TAKE 1 TABLET (50 MG TOTAL) BY MOUTH EVERY 12 (TWELVE) HOURS 180 tablet 3    olmesartan (BENICAR) 20 mg tablet Take 0 5 tablets (10 mg total) by mouth 2 (two) times a day 90 tablet 3    pantoprazole (PROTONIX) 40 mg tablet Take 1 tablet (40 mg total) by mouth daily 90 tablet 2    pramipexole (MIRAPEX) 0 25 mg tablet Take 1 tablet (0 25 mg total) by mouth daily at bedtime 30 tablet 3    Probiotic Product (PROBIOTIC PO) Take 1 capsule by mouth 2 (two) times a day      rosuvastatin (CRESTOR) 20 MG tablet Take 1 tablet (20 mg total) by mouth daily 30 tablet 0    torsemide (DEMADEX) 20 mg tablet TAKE 1 TABLET BY MOUTH EVERY DAY 90 tablet 3    insulin detemir (Levemir FlexTouch) 100 Units/mL injection pen Inject 50 Units under the skin every evening       Vitamin D, Ergocalciferol, 2000 units CAPS Take 2,000 Units by mouth daily        No current facility-administered medications for this visit  Objective:  /74 (BP Location: Left arm, Patient Position: Sitting, Cuff Size: Standard)   Pulse 67   Temp 98 7 °F (37 1 °C)   Resp 18   Ht 5' 3" (1 6 m)   Wt 72 6 kg (160 lb)   SpO2 98%   BMI 28 34 kg/m²    Wt Readings from Last 3 Encounters:   08/11/22 72 6 kg (160 lb)   08/05/22 74 8 kg (165 lb)   08/04/22 72 6 kg (160 lb)      BP Readings from Last 3 Encounters:   08/11/22 126/74   08/05/22 126/74   08/04/22 126/72          Physical Exam  Vitals and nursing note reviewed  Constitutional:       Appearance: Normal appearance  She is well-developed  HENT:      Head: Normocephalic and atraumatic        Mouth/Throat:      Mouth: Mucous membranes are moist    Eyes:      Conjunctiva/sclera: Conjunctivae normal    Neck:      Thyroid: No thyromegaly  Cardiovascular:      Rate and Rhythm: Normal rate and regular rhythm  Pulses: Normal pulses  Heart sounds: Normal heart sounds  Pulmonary:      Effort: Pulmonary effort is normal       Breath sounds: Normal breath sounds  Abdominal:      General: Bowel sounds are normal  There is no distension  Palpations: Abdomen is soft  There is no mass  Tenderness: There is no abdominal tenderness  There is no right CVA tenderness, left CVA tenderness, guarding or rebound  Musculoskeletal:         General: No swelling or tenderness  Cervical back: Neck supple  Right lower leg: No edema  Left lower leg: No edema  Comments: Right foot in post-op shoe   Neurological:      General: No focal deficit present  Mental Status: She is alert and oriented to person, place, and time  Psychiatric:         Mood and Affect: Mood normal          Lab Results:      Lab Results   Component Value Date    WBC 14 08 (H) 07/22/2022    HGB 10 6 (L) 07/22/2022    HCT 32 1 (L) 07/22/2022     07/22/2022    ALT 23 07/22/2022    AST 21 07/22/2022    K 3 9 08/11/2022     08/11/2022    CREATININE 1 78 (H) 08/11/2022    BUN 28 (H) 08/11/2022    CO2 29 08/11/2022    TSH 0 89 03/06/2020    INR 0 93 07/20/2022    GLUF 128 (H) 08/11/2022    HGBA1C 6 8 (H) 07/07/2022     No results found for: URICACID  Invalid input(s): BASENAME Vitamin D    XR chest 1 view portable    Result Date: 7/20/2022  Narrative: CHEST INDICATION:   SOB  Evaluation for intrathroacic pathology    COMPARISON:  4/13/2022 EXAM PERFORMED/VIEWS:  XR CHEST PORTABLE Single view FINDINGS: Cardiomediastinal silhouette appears unremarkable  The lungs are clear  No pneumothorax or pleural effusion  Osseous structures appear within normal limits for patient age   Right cervical neurostimulator unit with generator overlying mid right hemithorax     Impression: No acute cardiopulmonary disease  Findings are stable Workstation performed: [id number]     XR foot 3+ vw right    Result Date: 7/21/2022  Narrative: RIGHT FOOT INDICATION:   s/p fall  post op state    COMPARISON:  July 19, 2022 VIEWS:  XR FOOT 3+ VW RIGHT FINDINGS: There is no acute fracture or dislocation  Orthopedic plate and screw fixation of the 1st MTP joint is noted  Findings stable from recent prior  Remaining joints unremarkable  Plantar calcaneal bone spur  No lytic or blastic osseous lesion  Soft tissues are unremarkable  Impression: No acute findings  Stable postop changes at the 1st MTP joint with orthopedic hardware appearing intact Workstation performed: [id number]     XR foot 3+ vw right    Result Date: 7/20/2022  Narrative: RIGHT FOOT INDICATION:   post op  COMPARISON:  None VIEWS:  XR FOOT 3+ VW RIGHT Images: 3 FINDINGS: Fusion of the 1st metatarsophalangeal joint noted  Plate-screw fixation noted with multiple screws Hardware is intact No significant degenerative changes  No lytic or blastic osseous lesion  Soft tissues are unremarkable  Impression: Status post fusion of the 1st metatarsophalangeal joint with intact hardware Workstation performed: [id number]     NM lung perfusion imaging (particulate)    Result Date: 7/20/2022  Narrative: LUNG PERFUSION SCAN INDICATION: Shortness of breath  Decreased oxygen saturation  GFR 26  Patient has confirmed COVID-19  COMPARISON:  Chest radiograph  7/20/2022 TECHNIQUE:  Multiplanar perfusion imaging was performed following the intravenous administration of 2 2 mCi Tc-99m labeled MAA  (History of pulmonary hypertension)  No ventilation imaging was performed as per current Covid-19 protocol  FINDINGS:  Perfusion imaging demonstrates a mildly heterogeneous distribution of activity without a discrete segmental or significant subsegmental perfusion defect  Impression: Low probability of pulmonary embolism  Workstation performed: [id number]     US guided breast biopsy right complete, Mammo post biopsy right    Addendum Date: 7/20/2022 Addendum:   ADDENDUM: PATHOLOGY RESULTS: Final Diagnosis A  Breast, Right, 100 5 cm from nipple, Biopsy: - Benign fibroadipose tissue with extensive fat necrosis  - Negative for malignancy  The pathology results are benign and concordant with imaging; likely related cardiac device placement  RECOMMENDATION:      - Return to Routine Screening for the right breast  END ADDENDUM    Result Date: 7/18/2022  Narrative: DIAGNOSIS: Abnormal mammogram INDICATION: Eduardo Boswell is a 67 y o  female presenting for ultrasound-guided core needle biopsy  FINDINGS: RIGHT B) FOCAL ASYMMETRY US guided breast biopsy right complete: Images of the right breast asymmetry in the upper inner quadrant at 1 o'clock, 5 cm from the nipple were obtained  The patient was placed supine on the biopsy table  A core needle biopsy was performed under ultrasound guidance using a medial approach  The skin was prepped in the usual fashion  Anesthesia was administered using lidocaine 1%  A 12 G device was advanced in multiple passes  A ribbon clip was inserted into the target area  Post-placement ultrasound and digital mammographic imaging were performed  The patient tolerated the procedure well  There were no immediate complications  Impression:  Technically successful ultrasound-guided core needle biopsy of a hypoechoic mass in the 1 o'clock position right breast, 5 cm from nipple with ribbon shaped biopsy marker clip placement  RECOMMENDATION:      - Waiting for pathology for the right breast  Workstation ID: [id number]     Mammo diagnostic right w 3d & cad, US breast right limited (diagnostic)    Result Date: 7/18/2022  Narrative: DIAGNOSIS: Abnormal mammogram TECHNIQUE: Digital diagnostic mammography was performed  Computer Aided Detection (CAD) analyzed all applicable images   Ultrasound of the right breast(s) was performed  COMPARISONS: Prior breast imaging dated: 07/13/2022, 05/05/2021, 04/29/2019, 04/25/2018, 02/21/2017, 02/09/2016, and 02/05/2015 RELEVANT HISTORY: Family Breast Cancer History: History of breast cancer in Neg Hx  Family Medical History: No known relevant family medical history  Personal History: Hormone history includes birth control and other  Surgical history includes breast biopsy and breast reduction  No known relevant medical history  RISK ASSESSMENT: 5 Year Tyrer-Cuzick: 0 72 % 10 Year Tyrer-Cuzick: 1 54 % Lifetime Tyrer-Cuzick: 1 89 % TISSUE DENSITY: There are scattered areas of fibroglandular density  INDICATION: Eduardo Boswell is a 67 y o  female presenting for abnormal screening mammogram  FINDINGS: RIGHT B) FOCAL ASYMMETRY Mammo diagnostic right w 3d & cad: There is a focal asymmetry seen in the upper inner quadrant of the right breast  US breast right limited (diagnostic): There is a 10 mm x 5 mm x 11 mm irregularly shaped, hypoechoic mass with indistinct margins seen in the upper inner quadrant of the right breast at 1 o'clock, 5 cm from the nipple  No right axillary adenopathy is identified  Impression: Suspicious mass in the 1 o'clock position right breast, 5 cm from the nipple  Right ultrasound-guided core needle biopsy was subsequently performed and reported separately  The findings and recommendations were discussed with the patient at the time of this exam  ASSESSMENT/BI-RADS CATEGORY: Right: 4 - Suspicious Overall: 4 - Suspicious RECOMMENDATION:      - Ultrasound-guided breast biopsy for the right breast  Workstation ID: [id number]     Mammo screening bilateral w 3d & cad    Result Date: 7/15/2022  Narrative: DIAGNOSIS: Encounter for screening mammogram for breast cancer TECHNIQUE: Digital screening mammography was performed  Computer Aided Detection (CAD) analyzed all applicable images   COMPARISONS: Prior breast imaging dated: 05/05/2021, 04/29/2019, 04/25/2018, 02/21/2017, 02/09/2016, and 02/05/2015 RELEVANT HISTORY: Family Breast Cancer History: No known family history of breast cancer  Family Medical History: No known relevant family medical history  Personal History: Hormone history includes birth control and other  Surgical history includes breast biopsy and breast reduction  No known relevant medical history  The patient is scheduled in a reminder system for screening mammography  8-10% of cancers will be missed on mammography  Management of a palpable abnormality must be based on clinical grounds  Patients will be notified of their results via letter from our facility  Accredited by [employer] of [employer] and FDA  RISK ASSESSMENT: 5 Year Tyrer-Cuzick: 0 72 % 10 Year Tyrer-Cuzick: 1 54 % Lifetime Tyrer-Cuzick: 1 9 % TISSUE DENSITY: There are scattered areas of fibroglandular density  INDICATION: Ashley Ivan is a 67 y o  female presenting for screening mammography  FINDINGS: RIGHT B) FOCAL ASYMMETRY: There is a focal asymmetry seen in the upper inner quadrant of the right breast  Left There are no suspicious masses, grouped microcalcifications or areas of unexplained architectural distortion  The skin and nipple areolar complex are unremarkable  Biopsy marker clip is noted in the right breast   Benign-appearing calcifications are noted in each breast   Cardiac device overlies the upper right breast      Impression: Additional imaging required   A breast health care nurse from our facility will be contacting the patient regarding the need for additional imaging ASSESSMENT/BI-RADS CATEGORY: Left: 2 - Benign Right: 0 - Incomplete: Needs Additional Imaging Evaluation Overall: 0 - Incomplete: Needs Additional Imaging Evaluation RECOMMENDATION:      - Diagnostic mammogram at the current time for the right breast       - Ultrasound at the current time for the right breast       - Routine screening mammogram in 1 year for the left breast  Workstation ID: [id number] [facility]

## 2022-08-11 NOTE — RESULT ENCOUNTER NOTE
Pending Urine Culture  If negative, patient will need repeat UA C&S in 1 month due to hematuria  Continue doxycycline 100mg BID x 7 days for suspected recurrent UTI

## 2022-08-13 LAB — BACTERIA UR CULT: ABNORMAL

## 2022-08-14 NOTE — RESULT ENCOUNTER NOTE
Urine culture shows Enterobacter cloacae bacteria in the urine, but not enough to be considered a true urinary tract infection  Patient should complete Doxycycline antibiotic as she was symptomatic  This bacteria strain requires contact precautions  Patient should be the only one using her bathroom if possible, and toilet should be cleaned after each use  She should follow-up with urology as discussed  Message sent to patient via [facility] patient portal     Nurse to also call patient

## 2022-08-23 ENCOUNTER — HOSPITAL ENCOUNTER (OUTPATIENT)
Dept: PULMONOLOGY | Facility: HOSPITAL | Age: 72
Discharge: HOME | End: 2022-08-23
Attending: INTERNAL MEDICINE
Payer: MEDICARE

## 2022-08-23 DIAGNOSIS — J45.909 ASTHMA, UNSPECIFIED ASTHMA SEVERITY, UNSPECIFIED WHETHER COMPLICATED, UNSPECIFIED WHETHER PERSISTENT: ICD-10-CM

## 2022-08-23 PROCEDURE — 94729 DIFFUSING CAPACITY: CPT

## 2022-08-23 PROCEDURE — 94760 N-INVAS EAR/PLS OXIMETRY 1: CPT

## 2022-08-23 PROCEDURE — 94726 PLETHYSMOGRAPHY LUNG VOLUMES: CPT | Performed by: INTERNAL MEDICINE

## 2022-08-23 PROCEDURE — 94726 PLETHYSMOGRAPHY LUNG VOLUMES: CPT

## 2022-08-23 PROCEDURE — 94060 EVALUATION OF WHEEZING: CPT

## 2022-08-23 PROCEDURE — 94060 EVALUATION OF WHEEZING: CPT | Performed by: INTERNAL MEDICINE

## 2022-08-23 PROCEDURE — 94729 DIFFUSING CAPACITY: CPT | Performed by: INTERNAL MEDICINE

## 2022-08-23 RX ORDER — ALBUTEROL SULFATE 2.5 MG/3ML
2.5 SOLUTION RESPIRATORY (INHALATION) ONCE
Status: COMPLETED | OUTPATIENT
Start: 2022-08-23 | End: 2022-08-23

## 2022-08-23 RX ADMIN — ALBUTEROL SULFATE 2.5 MG: 2.5 SOLUTION RESPIRATORY (INHALATION) at 10:43

## 2022-08-26 DIAGNOSIS — G25.81 RLS (RESTLESS LEGS SYNDROME): ICD-10-CM

## 2022-08-26 RX ORDER — PRAMIPEXOLE DIHYDROCHLORIDE 0.25 MG/1
0.25 TABLET ORAL
Qty: 90 TABLET | Refills: 2 | Status: SHIPPED | OUTPATIENT
Start: 2022-08-26 | End: 2022-08-31 | Stop reason: SDUPTHER

## 2022-08-31 DIAGNOSIS — G25.81 RLS (RESTLESS LEGS SYNDROME): ICD-10-CM

## 2022-09-01 RX ORDER — PRAMIPEXOLE DIHYDROCHLORIDE 0.25 MG/1
0.25 TABLET ORAL
Qty: 90 TABLET | Refills: 0 | Status: SHIPPED | OUTPATIENT
Start: 2022-09-01

## 2022-09-02 DIAGNOSIS — N18.30 CHRONIC KIDNEY DISEASE, STAGE III (MODERATE) (HCC): ICD-10-CM

## 2022-09-02 DIAGNOSIS — I12.9 HYPERTENSIVE CHRONIC KIDNEY DISEASE WITH STAGE 1 THROUGH STAGE 4 CHRONIC KIDNEY DISEASE, OR UNSPECIFIED CHRONIC KIDNEY DISEASE: ICD-10-CM

## 2022-09-02 DIAGNOSIS — I10 ESSENTIAL HYPERTENSION: ICD-10-CM

## 2022-09-02 RX ORDER — AMLODIPINE BESYLATE 5 MG/1
5 TABLET ORAL DAILY
Qty: 90 TABLET | Refills: 0 | Status: SHIPPED | OUTPATIENT
Start: 2022-09-02

## 2022-09-10 ENCOUNTER — HOSPITAL ENCOUNTER (EMERGENCY)
Facility: HOSPITAL | Age: 72
Discharge: HOME | End: 2022-09-10
Attending: EMERGENCY MEDICINE
Payer: MEDICARE

## 2022-09-10 ENCOUNTER — APPOINTMENT (OUTPATIENT)
Dept: RADIOLOGY | Facility: HOSPITAL | Age: 72
End: 2022-09-10
Payer: MEDICARE

## 2022-09-10 VITALS
SYSTOLIC BLOOD PRESSURE: 158 MMHG | BODY MASS INDEX: 28.87 KG/M2 | WEIGHT: 163 LBS | OXYGEN SATURATION: 97 % | TEMPERATURE: 98.1 F | DIASTOLIC BLOOD PRESSURE: 77 MMHG | RESPIRATION RATE: 18 BRPM | HEART RATE: 60 BPM

## 2022-09-10 DIAGNOSIS — N18.31 CHRONIC KIDNEY DISEASE (CKD) STAGE G3A/A1, MODERATELY DECREASED GLOMERULAR FILTRATION RATE (GFR) BETWEEN 45-59 ML/MIN/1.73 SQUARE METER AND ALBUMINURIA CREATININE RATIO LESS THAN 30 MG/G (HCC): ICD-10-CM

## 2022-09-10 DIAGNOSIS — I12.9 HYPERTENSIVE CHRONIC KIDNEY DISEASE WITH STAGE 1 THROUGH STAGE 4 CHRONIC KIDNEY DISEASE, OR UNSPECIFIED CHRONIC KIDNEY DISEASE: ICD-10-CM

## 2022-09-10 DIAGNOSIS — K29.70 GASTRITIS: Primary | ICD-10-CM

## 2022-09-10 LAB
2HR DELTA HS TROPONIN: 0.5 NG/L
ALBUMIN SERPL BCP-MCNC: 3.8 G/DL (ref 3.5–5)
ALP SERPL-CCNC: 89 U/L (ref 34–104)
ALT SERPL W P-5'-P-CCNC: 21 U/L (ref 7–52)
ANION GAP SERPL CALCULATED.3IONS-SCNC: 8 MMOL/L (ref 4–13)
AST SERPL W P-5'-P-CCNC: 18 U/L (ref 13–39)
BASOPHILS # BLD AUTO: 0.04 THOUSANDS/ΜL (ref 0–0.1)
BASOPHILS NFR BLD AUTO: 0 % (ref 0–1)
BILIRUB SERPL-MCNC: 0.4 MG/DL (ref 0.2–1)
BUN SERPL-MCNC: 32 MG/DL (ref 5–25)
CALCIUM SERPL-MCNC: 8.7 MG/DL (ref 8.4–10.2)
CARDIAC TROPONIN I PNL SERPL HS: 4 NG/L
CARDIAC TROPONIN I PNL SERPL HS: 4.5 NG/L
CHLORIDE SERPL-SCNC: 102 MMOL/L (ref 96–108)
CO2 SERPL-SCNC: 28 MMOL/L (ref 21–32)
CREAT SERPL-MCNC: 1.66 MG/DL (ref 0.6–1.3)
EOSINOPHIL # BLD AUTO: 0.25 THOUSAND/ΜL (ref 0–0.61)
EOSINOPHIL NFR BLD AUTO: 3 % (ref 0–6)
ERYTHROCYTE [DISTWIDTH] IN BLOOD BY AUTOMATED COUNT: 15.1 % (ref 11.6–15.1)
GFR SERPL CREATININE-BSD FRML MDRD: 30 ML/MIN/1.73SQ M
GLUCOSE SERPL-MCNC: 222 MG/DL (ref 65–140)
HCT VFR BLD AUTO: 37.2 % (ref 34.8–46.1)
HGB BLD-MCNC: 12.1 G/DL (ref 11.5–15.4)
LIPASE SERPL-CCNC: 22 U/L (ref 11–82)
LYMPHOCYTES # BLD AUTO: 2.07 THOUSANDS/ΜL (ref 0.6–4.47)
LYMPHOCYTES NFR BLD AUTO: 21 % (ref 14–44)
MCH RBC QN AUTO: 28.1 PG (ref 26.8–34.3)
MCHC RBC AUTO-ENTMCNC: 32.5 G/DL (ref 31.4–37.4)
MCV RBC AUTO: 87 FL (ref 82–98)
MONOCYTES # BLD AUTO: 0.58 THOUSAND/ΜL (ref 0.17–1.22)
MONOCYTES NFR BLD AUTO: 6 % (ref 4–12)
NEUTROPHILS # BLD AUTO: 7.03 THOUSANDS/ΜL (ref 1.85–7.62)
NEUTS SEG NFR BLD AUTO: 70 % (ref 43–75)
PLATELET # BLD AUTO: 242 THOUSANDS/UL (ref 149–390)
PMV BLD AUTO: 11.4 FL (ref 8.9–12.7)
POTASSIUM SERPL-SCNC: 3.5 MMOL/L (ref 3.5–5.3)
PROT SERPL-MCNC: 6.3 G/DL (ref 6.4–8.4)
RBC # BLD AUTO: 4.3 MILLION/UL (ref 3.81–5.12)
SODIUM SERPL-SCNC: 138 MMOL/L (ref 135–147)
WBC # BLD AUTO: 10.02 THOUSAND/UL (ref 4.31–10.16)

## 2022-09-10 PROCEDURE — 99285 EMERGENCY DEPT VISIT HI MDM: CPT | Performed by: PHYSICIAN ASSISTANT

## 2022-09-10 PROCEDURE — 85025 COMPLETE CBC W/AUTO DIFF WBC: CPT | Performed by: PHYSICIAN ASSISTANT

## 2022-09-10 PROCEDURE — 80053 COMPREHEN METABOLIC PANEL: CPT | Performed by: PHYSICIAN ASSISTANT

## 2022-09-10 PROCEDURE — 71046 X-RAY EXAM CHEST 2 VIEWS: CPT

## 2022-09-10 PROCEDURE — 83690 ASSAY OF LIPASE: CPT | Performed by: PHYSICIAN ASSISTANT

## 2022-09-10 PROCEDURE — 99284 EMERGENCY DEPT VISIT MOD MDM: CPT

## 2022-09-10 PROCEDURE — 84484 ASSAY OF TROPONIN QUANT: CPT | Performed by: PHYSICIAN ASSISTANT

## 2022-09-10 PROCEDURE — 36415 COLL VENOUS BLD VENIPUNCTURE: CPT | Performed by: PHYSICIAN ASSISTANT

## 2022-09-10 PROCEDURE — 93005 ELECTROCARDIOGRAM TRACING: CPT

## 2022-09-10 RX ORDER — SUCRALFATE 1 G/1
1 TABLET ORAL 4 TIMES DAILY
Qty: 40 TABLET | Refills: 0 | Status: SHIPPED | OUTPATIENT
Start: 2022-09-10 | End: 2022-09-20

## 2022-09-10 RX ORDER — MAGNESIUM HYDROXIDE/ALUMINUM HYDROXICE/SIMETHICONE 120; 1200; 1200 MG/30ML; MG/30ML; MG/30ML
30 SUSPENSION ORAL ONCE
Status: COMPLETED | OUTPATIENT
Start: 2022-09-10 | End: 2022-09-10

## 2022-09-10 RX ORDER — LIDOCAINE HYDROCHLORIDE 20 MG/ML
15 SOLUTION OROPHARYNGEAL ONCE
Status: COMPLETED | OUTPATIENT
Start: 2022-09-10 | End: 2022-09-10

## 2022-09-10 RX ORDER — SUCRALFATE ORAL 1 G/10ML
1 SUSPENSION ORAL 4 TIMES DAILY
Qty: 400 ML | Refills: 0 | Status: SHIPPED | OUTPATIENT
Start: 2022-09-10 | End: 2022-09-15

## 2022-09-10 RX ADMIN — LIDOCAINE HYDROCHLORIDE 15 ML: 20 SOLUTION ORAL; TOPICAL at 09:03

## 2022-09-10 RX ADMIN — ALUMINA, MAGNESIA, AND SIMETHICONE ORAL SUSPENSION REGULAR STRENGTH 30 ML: 1200; 1200; 120 SUSPENSION ORAL at 09:03

## 2022-09-10 NOTE — ED PROVIDER NOTES
History  Chief Complaint   Patient presents with    Abdominal Pain     States her acid reflux is acting up since last night and that this happens a few times a year - comes to ED for IV Protonix     67-year-old female presents to the emergency department with complaints of epigastric pain  States that she has been having some epigastric burning which started overnight  No symptoms radiating through her chest   Notes she has had a slight cough developing has been coughing up some yellow-colored mucus  States she has a history gastroparesis and had Botox injections to help with gastric emptying proximally 4 months ago  Notes that over the past several weeks her diet has not been the best, bleeding to symptoms of epigastric discomfort  States she did take some Mylanta overnight which seem to alleviate her symptoms for several hours  She denies associated diaphoresis  History provided by:  Patient   used: No    Abdominal Pain  Pain location:  Epigastric  Pain quality: burning and cramping    Pain radiates to:  Chest  Pain severity:  Moderate  Onset quality:  Gradual  Timing:  Constant  Progression:  Waxing and waning  Chronicity:  Recurrent  Relieved by: mylanta  Ineffective treatments:  None tried  Associated symptoms: chest pain, cough and nausea    Associated symptoms: no anorexia, no belching, no chills, no constipation, no diarrhea, no dysuria, no fatigue, no fever, no flatus, no hematemesis, no hematochezia, no hematuria, no melena, no shortness of breath, no sore throat and no vomiting        Prior to Admission Medications   Prescriptions Last Dose Informant Patient Reported? Taking?    B-D ULTRAFINE III SHORT PEN 31G X 8 MM MISC  Self Yes No   Sig: USE AS DIRECTED 4 TIMES PER DAY   BIOTIN PO  Self Yes No   Sig: Take by mouth   DULoxetine HCl (CYMBALTA PO)  Self Yes No   Sig: Take 90 mg by mouth in the morning   Krill Oil (Omega-3) 500 MG CAPS  Self Yes No   Sig: Take 1,600 mg by mouth daily   Probiotic Product (PROBIOTIC PO)  Self Yes No   Sig: Take 1 capsule by mouth 2 (two) times a day   Vitamin D, Ergocalciferol, 2000 units CAPS  Self Yes No   Sig: Take 2,000 Units by mouth daily    acetaminophen (TYLENOL) 325 mg tablet  Self No No   Sig: Take 2 tablets (650 mg total) by mouth every 6 (six) hours as needed for mild pain, headaches or fever   albuterol (Ventolin HFA) 90 mcg/act inhaler  Self No No   Sig: Inhale 2 puffs every 6 (six) hours as needed for wheezing   amLODIPine (NORVASC) 5 mg tablet   No No   Sig: TAKE 1 TABLET (5 MG TOTAL) BY MOUTH DAILY  b complex vitamins capsule  Self Yes No   Sig: Take 1 capsule by mouth daily   calcium carbonate (TUMS) 500 mg chewable tablet   Yes No   Sig: Chew 1 tablet 2 (two) times a day   famotidine (PEPCID) 10 mg tablet   No No   Sig: Take 2 tablets (20 mg total) by mouth daily at bedtime   fluticasone (FLONASE) 50 mcg/act nasal spray   No No   Si-2 sprays into each nostril daily   Patient taking differently: 1-2 sprays into each nostril if needed   gabapentin (NEURONTIN) 100 mg capsule  Self Yes No   Sig: Take 100 mg by mouth daily   insulin aspart (NovoLOG) 100 units/mL injection  Self Yes No   Sig: Inject under the skin 3 (three) times a day before meals 16 units, 16 units, 25 units     insulin detemir (Levemir FlexTouch) 100 Units/mL injection pen  Self Yes No   Sig: Inject 50 Units under the skin every evening    levothyroxine 112 mcg tablet  Self Yes No   Sig: Take 112 mcg by mouth every morning   metoprolol tartrate (LOPRESSOR) 50 mg tablet  Self No No   Sig: TAKE 1 TABLET (50 MG TOTAL) BY MOUTH EVERY 12 (TWELVE) HOURS   olmesartan (BENICAR) 20 mg tablet  Self No No   Sig: Take 0 5 tablets (10 mg total) by mouth 2 (two) times a day   pantoprazole (PROTONIX) 40 mg tablet   No No   Sig: Take 1 tablet (40 mg total) by mouth daily   pramipexole (MIRAPEX) 0 25 mg tablet   No No   Sig: Take 1 tablet (0 25 mg total) by mouth daily at bedtime rosuvastatin (CRESTOR) 20 MG tablet  Self No No   Sig: Take 1 tablet (20 mg total) by mouth daily   torsemide (DEMADEX) 20 mg tablet  Self No No   Sig: TAKE 1 TABLET BY MOUTH EVERY DAY      Facility-Administered Medications: None       Past Medical History:   Diagnosis Date    Allergic     Allergic rhinitis     Anesthesia     pt reports "had to use double lumen endo tube for hiatal hernia repair/so surgeon could get to where he needed to work"    Arthritis     Aspiration into airway     Basal cell carcinoma 2007    left cheek     BCC (basal cell carcinoma) 05/27/2021    Left Nasal tip    [facility]     squamous cell cancer on forhead    Cancer [facility] )     basal cell on nose     Chronic kidney disease 2000, 2018    Stones, kidney disease stage 4    Chronic pain disorder     bilat feet and joint pain on occas    Colon polyp     Constipation     COPD [facility]chronic obstructive pulmonary disease) ([facility] )     COVID-19 08/2021    recovered at home/did receive monoclonal infusion    Dental bridge present     Depression     Diabetes mellitus ([facility] )     Type 2    Diabetic neuropathy ([place] [address] )     Disease of thyroid gland     Family history of thyroid problem     Fatty liver     GERD (gastroesophageal reflux disease)     Heart burn     Hiatal hernia     History of pneumonia     Hyperlipidemia     Hyperplasia, parathyroid [facility]     Hypertension     Kidney problem     Kidney stone     Memory loss Julu2 2020    Motion sickness     Motion sickness     Neck pain     Obesity 1978    Obesity (BMI 30 0-34  9)     Pollen allergies     RLS (restless legs syndrome)     SCC (squamous cell carcinoma) 05/04/2021    left mid forehead    Seasonal allergies     Sleep apnea     has inspire    Squamous cell skin cancer 2007    left cheek     Swollen ankles     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, left     great toe fusion    Urinary incontinence     Urinary tract infection 03/28/2022    Wears glasses Past Surgical History:   Procedure Laterality Date    ARTHROSCOPY KNEE      BREAST BIOPSY      stereotactic-benign    BREAST BIOPSY      stereo-benign    BREAST EXCISIONAL BIOPSY      unknown date-benign    BREAST EXCISIONAL BIOPSY      unknown date-benign    BREAST EXCISIONAL BIOPSY      unknown date-benign    BREAST EXCISIONAL BIOPSY      unknown age-benign    CARDIAC CATHETERIZATION      CHOLECYSTECTOMY      COLONOSCOPY      EXAMINATION UNDER ANESTHESIA N/A 06/24/2021    Procedure: EXAM UNDER ANESTHESIA (EUA), DISE;  Surgeon: Alicja Newman MD;  [facility];  Service: ENT   [facility]      KNEE SURGERY      Torn maniscus lap surg    LIPOSUCTION      LYMPH NODE BIOPSY      MOHS SURGERY  05/20/2021    left mid forehead-Gautam    MOHS SURGERY Left 05/27/2021    Left nasal tip- gautam     AL FOOT/TOES SURGERY PROC UNLISTED Right 07/19/2022    Procedure: 1st metatarsal phalangeal joint fusion;  Surgeon: Colman Olszewski, DPM;  [facility];  Service: Podiatry    AL INCISION IMPLANT CRANIAL NERVE STIM ELECTRODE/PULSE GEN N/A 11/10/2021    Procedure: INSERTION UPPER AIRWAY STIMULATOR, INSPIRE IMPLANT;  Surgeon: Alicja Newman MD;  [facility];  Service: ENT    REDUCTION MAMMAPLASTY Bilateral 2000    REDUCTION MAMMAPLASTY      SKIN BIOPSY      SKIN CANCER EXCISION  2007    squamous cell carcinoma     SKIN CANCER EXCISION  2007    basal cell carcinoma    SQUAMOUS CELL CARCINOMA EXCISION      TOE SURGERY Right 08/04/2022    Right Great Toe Fusion    TONSILLECTOMY      TUBAL LIGATION      UPPER GASTROINTESTINAL ENDOSCOPY      US GUIDED BREAST BIOPSY RIGHT COMPLETE Right 07/18/2022       Family History   Problem Relation Age of Onset    Heart disease Mother     Depression Mother     Hypertension Mother     COPD Mother     Hearing loss Mother     Anxiety disorder Mother     Heart disease Father     Lung cancer Father 79 Smoker     Cancer Father         brain    Alcohol abuse Father    Gracie Urbina Dementia Father     Hypertension Father     Thyroid disease Father     COPD Father     Arthritis Father     Brain cancer Father 76    Hypertension Sister     Diabetes Sister     Heart disease Sister     Thyroid disease Sister    Gracie Urbina Cancer Sister         Lympoma, lung    Lung cancer Sister 78    Anxiety disorder Sister     Hypertension Brother     Diabetes Brother     Cancer Brother         Throat    Dementia Brother     Stroke Brother     Hypertension Brother     Heart disease Brother     Diabetes Brother     No Known Problems Son     No Known Problems Son     Brain cancer Paternal [de-identified]         unknown age   Gracie Urbina Breast cancer Neg Hx      I have reviewed and agree with the history as documented  E-Cigarette/Vaping    E-Cigarette Use Never User      E-Cigarette/Vaping Substances    Nicotine No     THC No     CBD No     Flavoring No     Other No     Unknown No      Social History     Tobacco Use    Smoking status: Former Smoker     Packs/day: 2 00     Years: 10 00     Pack years: 20 00     Types: Cigarettes     Start date: 1968     Quit date: 1976     Years since quittin 7    Smokeless tobacco: Never Used    Tobacco comment: Smoked 2 pack a day   Vaping Use    Vaping Use: Never used   Substance Use Topics    Alcohol use: Yes     Comment: 1 or 2 a year    Drug use: No       Review of Systems   Constitutional: Negative for activity change, appetite change, chills, fatigue and fever  HENT: Negative for congestion, dental problem, drooling, ear discharge, ear pain, mouth sores, nosebleeds, rhinorrhea, sore throat and trouble swallowing  Eyes: Negative for pain, discharge and itching  Respiratory: Positive for cough  Negative for chest tightness, shortness of breath and wheezing  Cardiovascular: Positive for chest pain  Negative for palpitations     Gastrointestinal: Positive for abdominal pain and nausea  Negative for anorexia, blood in stool, constipation, diarrhea, flatus, hematemesis, hematochezia, melena and vomiting  Endocrine: Negative for cold intolerance and heat intolerance  Genitourinary: Negative for difficulty urinating, dysuria, flank pain, frequency, hematuria and urgency  Skin: Negative for rash and wound  Allergic/Immunologic: Negative for food allergies and immunocompromised state  Neurological: Negative for dizziness, seizures, syncope, weakness, numbness and headaches  Psychiatric/Behavioral: Negative for agitation, behavioral problems and confusion  Physical Exam  Physical Exam  Vitals and nursing note reviewed  Constitutional:       General: She is not in acute distress  Appearance: She is not diaphoretic  HENT:      Head: Normocephalic and atraumatic  Right Ear: External ear normal       Left Ear: External ear normal       Mouth/Throat:      Mouth: Mucous membranes are moist    Eyes:      Conjunctiva/sclera: Conjunctivae normal    Neck:      Vascular: No JVD  Trachea: No tracheal deviation  Cardiovascular:      Rate and Rhythm: Normal rate and regular rhythm  Heart sounds: Normal heart sounds  No murmur heard  No friction rub  No gallop  Pulmonary:      Effort: Pulmonary effort is normal  No respiratory distress  Breath sounds: Wheezing present  No rales  Chest:      Chest wall: No tenderness  Abdominal:      General: Bowel sounds are normal  There is no distension  Palpations: Abdomen is soft  Tenderness: There is abdominal tenderness in the epigastric area  There is no guarding  Musculoskeletal:         General: No tenderness or deformity  Normal range of motion  Lymphadenopathy:      Cervical: No cervical adenopathy  Skin:     General: Skin is warm and dry  Findings: No erythema or rash  Neurological:      General: No focal deficit present        Mental Status: She is alert and oriented to person, place, and time     Psychiatric:         Mood and Affect: Mood normal          Behavior: Behavior normal          Vital Signs  ED Triage Vitals   Temperature Pulse Respirations Blood Pressure SpO2   09/10/22 0807 09/10/22 0807 09/10/22 0807 09/10/22 0807 09/10/22 0807   98 1 °F (36 7 °C) 64 18 (!) 174/77 95 %      Temp Source Heart Rate Source Patient Position - Orthostatic VS BP Location FiO2 (%)   09/10/22 0807 09/10/22 0807 09/10/22 1129 09/10/22 1129 --   Oral Monitor Sitting Right arm       Pain Score       09/10/22 0807       8           Vitals:    09/10/22 0807 09/10/22 1129   BP: (!) 174/77 157/75   Pulse: 64 62   Patient Position - Orthostatic VS:  Sitting         Visual Acuity      ED Medications  Medications   aluminum-magnesium hydroxide-simethicone (MYLANTA) oral suspension 30 mL (30 mL Oral Given 9/10/22 0903)   Lidocaine Viscous HCl (XYLOCAINE) 2 % mucosal solution 15 mL (15 mL Swish & Swallow Given 9/10/22 0903)       Diagnostic Studies  Results Reviewed     Procedure Component Value Units Date/Time    HS Troponin I 2hr [[id number]]  (Normal) Collected: 09/10/22 1128    Lab Status: Final result Specimen: Blood from Arm, Left Updated: 09/10/22 1305     hs TnI 2hr 4 5 ng/L      Delta 2hr hsTnI 0 5 ng/L     HS Troponin I 4hr [[id number]]     Lab Status: No result Specimen: Blood     HS Troponin 0hr (reflex protocol) [[id number]]  (Normal) Collected: 09/10/22 0843    Lab Status: Final result Specimen: Blood from Arm, Left Updated: 09/10/22 0938     hs TnI 0hr 4 ng/L     Comprehensive metabolic panel [[id number]]  (Abnormal) Collected: 09/10/22 0843    Lab Status: Final result Specimen: Blood from Arm, Left Updated: 09/10/22 0935     Sodium 138 mmol/L      Potassium 3 5 mmol/L      Chloride 102 mmol/L      CO2 28 mmol/L      ANION GAP 8 mmol/L      BUN 32 mg/dL      Creatinine 1 66 mg/dL      Glucose 222 mg/dL      Calcium 8 7 mg/dL      AST 18 U/L      ALT 21 U/L      Alkaline Phosphatase 89 U/L      Total Protein 6 3 g/dL      Albumin 3 8 g/dL      Total Bilirubin 0 40 mg/dL      eGFR 30 ml/min/1 73sq m     Narrative:      National Kidney Disease Foundation guidelines for Chronic Kidney Disease (CKD):     Stage 1 with normal or high GFR (GFR > 90 mL/min/1 73 square meters)    Stage 2 Mild CKD (GFR = 60-89 mL/min/1 73 square meters)    Stage 3A Moderate CKD (GFR = 45-59 mL/min/1 73 square meters)    Stage 3B Moderate CKD (GFR = 30-44 mL/min/1 73 square meters)    Stage 4 Severe CKD (GFR = 15-29 mL/min/1 73 square meters)    Stage 5 End Stage CKD (GFR <15 mL/min/1 73 square meters)  Note: GFR calculation is accurate only with a steady state creatinine    Lipase [[id number]]  (Normal) Collected: 09/10/22 0843    Lab Status: Final result Specimen: Blood from Arm, Left Updated: 09/10/22 0934     Lipase 22 u/L     CBC and differential [[id number]]  (Normal) Collected: 09/10/22 0843    Lab Status: Final result Specimen: Blood from Arm, Left Updated: 09/10/22 0915     WBC 10 02 Thousand/uL      RBC 4 30 Million/uL      Hemoglobin 12 1 g/dL      Hematocrit 37 2 %      MCV 87 fL      MCH 28 1 pg      MCHC 32 5 g/dL      RDW 15 1 %      MPV 11 4 fL      Platelets 003 Thousands/uL      Neutrophils Relative 70 %      Lymphocytes Relative 21 %      Monocytes Relative 6 %      Eosinophils Relative 3 %      Basophils Relative 0 %      Neutrophils Absolute 7 03 Thousands/µL      Lymphocytes Absolute 2 07 Thousands/µL      Monocytes Absolute 0 58 Thousand/µL      Eosinophils Absolute 0 25 Thousand/µL      Basophils Absolute 0 04 Thousands/µL                  XR chest 2 views   ED Interpretation by Rodolfo Yang PA-C (09/10 1109)   No focal abnormality       Final Result by Mariel Lu MD (09/10 1328)      No acute cardiopulmonary disease                    Workstation performed: [id number]                    Procedures  ECG 12 Lead Documentation Only    Date/Time: 9/10/2022 10:09 AM  Performed by: Rodolfo Yang MITLON  Authorized by: Jamee Solorio PA-C     Indications / Diagnosis:  Epigastric pain  ECG reviewed by me, the ED Provider: yes    Patient location:  ED  Previous ECG:     Previous ECG:  Compared to current    Comparison ECG info:  8/17/22    Similarity:  No change  Interpretation:     Interpretation: normal    Rate:     ECG rate:  62    ECG rate assessment: normal    Rhythm:     Rhythm: sinus rhythm    Ectopy:     Ectopy: none    QRS:     QRS axis:  Normal    QRS intervals:  Normal  Conduction:     Conduction: normal    ST segments:     ST segments:  Normal  T waves:     T waves: normal               ED Course  ED Course as of 09/10/22 1352   Sat Sep 10, 2022   1038 Patient with brief improvement of pain after GI cocktail  Test results thus far reviewed  Will wait for delta troponin prior to anticipated discharge  HEART Risk Score    Flowsheet Row Most Recent Value   Heart Score Risk Calculator    History 1 Filed at: 09/10/2022 1012   ECG 0 Filed at: 09/10/2022 1012   Age 2 Filed at: 09/10/2022 1012   Risk Factors 2 Filed at: 09/10/2022 1012   Troponin 0 Filed at: 09/10/2022 1012   HEART Score 5 Filed at: 09/10/2022 1012                        SBIRT 22yo+    Flowsheet Row Most Recent Value   SBIRT (25 yo +)    In order to provide better care to our patients, we are screening all of our patients for alcohol and drug use  Would it be okay to ask you these screening questions? Yes Filed at: 09/10/2022 8062   Initial Alcohol Screen: US AUDIT-C     1  How often do you have a drink containing alcohol? 0 Filed at: 09/10/2022 0809   2  How many drinks containing alcohol do you have on a typical day you are drinking? 0 Filed at: 09/10/2022 0809   3a  Male UNDER 65: How often do you have five or more drinks on one occasion? 0 Filed at: 09/10/2022 0809   3b  FEMALE Any Age, or MALE 65+: How often do you have 4 or more drinks on one occassion?  0 Filed at: 09/10/2022 0809   Audit-C Score 0 Filed at: 09/10/2022 0883   ELIO: How many times in the past year have you    Used an illegal drug or used a prescription medication for non-medical reasons? Never Filed at: 09/10/2022 0809                    [facility]  Number of Diagnoses or Management Options  Gastritis  Diagnosis management comments: Differential diagnosis includes but not limited to:  Gastritis, esophagitis, acute coronary syndrome, aspiration         Amount and/or Complexity of Data Reviewed  Clinical lab tests: ordered and reviewed  Tests in the radiology section of CPT®: ordered and reviewed  Independent visualization of images, tracings, or specimens: yes        Disposition  Final diagnoses:   Gastritis     Time reflects when diagnosis was documented in both MDM as applicable and the Disposition within this note     Time User Action Codes Description Comment    9/10/2022 12:55 PM Zachary Denton Phi Add [K29 70] Gastritis       ED Disposition     ED Disposition   Discharge    Condition   Stable    Date/Time   Sat Sep 10, 2022  1:22 PM    Comment   Thea Fuentes discharge to home/self care  Follow-up Information     Follow up With Specialties Details Why Contact Info    Bryan Figueroa MD [facility]   [place] [address]    [address] [place] [address]      Patsy Leung MD Gastroenterology   [address] [place] [address]  [phone]            Patient's Medications   Discharge Prescriptions    SUCRALFATE (CARAFATE) 1 G TABLET    Take 1 tablet (1 g total) by mouth 4 (four) times a day for 10 days Crush tablet and dissolve in [facility] water to make a sluree       Start Date: 9/10/2022 End Date: 9/20/2022       Order Dose: 1 g       Quantity: 40 tablet    Refills: 0    SUCRALFATE (CARAFATE) 1 G/10 ML SUSPENSION    Take 10 mL (1 g total) by mouth 4 (four) times a day for 10 days       Start Date: 9/10/2022 End Date: 9/20/2022       Order Dose: 1 g       Quantity: 400 mL    Refills: 0       No discharge procedures on file     PDMP Review       Value Time User    PDMP Reviewed  Yes 11/10/2021  3:47 PM Kan Dawn MD          ED Provider  Electronically Signed by           Alisa Bae PA-C  09/10/22 3703

## 2022-09-11 LAB
ATRIAL RATE: 62 BPM
P AXIS: 53 DEGREES
PR INTERVAL: 246 MS
QRS AXIS: -7 DEGREES
QRSD INTERVAL: 84 MS
QT INTERVAL: 458 MS
QTC INTERVAL: 464 MS
T WAVE AXIS: 117 DEGREES
VENTRICULAR RATE: 62 BPM

## 2022-09-11 PROCEDURE — 93010 ELECTROCARDIOGRAM REPORT: CPT | Performed by: INTERNAL MEDICINE

## 2022-09-11 RX ORDER — IRBESARTAN 150 MG/1
150 TABLET ORAL DAILY
Qty: 90 TABLET | Refills: 3 | Status: SHIPPED | OUTPATIENT
Start: 2022-09-11 | End: 2022-09-14 | Stop reason: HOSPADM

## 2022-09-12 RX ORDER — OLMESARTAN MEDOXOMIL 20 MG/1
10 TABLET ORAL DAILY
Qty: 45 TABLET | Refills: 3 | Status: SHIPPED | OUTPATIENT
Start: 2022-09-12 | End: 2022-10-04

## 2022-09-12 NOTE — TELEPHONE ENCOUNTER
Patient states she did not request a cheaper option for olmesartan and would like to stay with that medication  Patient was taking olmesartan 20mg 1/2 tablet twice a day

## 2022-09-15 ENCOUNTER — OFFICE VISIT (OUTPATIENT)
Dept: FAMILY MEDICINE CLINIC | Facility: CLINIC | Age: 72
End: 2022-09-15
Payer: MEDICARE

## 2022-09-15 VITALS
HEIGHT: 63 IN | WEIGHT: 169 LBS | TEMPERATURE: 97.4 F | RESPIRATION RATE: 16 BRPM | DIASTOLIC BLOOD PRESSURE: 68 MMHG | SYSTOLIC BLOOD PRESSURE: 112 MMHG | BODY MASS INDEX: 29.95 KG/M2 | OXYGEN SATURATION: 96 % | HEART RATE: 64 BPM

## 2022-09-15 DIAGNOSIS — R09.82 POST-NASAL DRIP: ICD-10-CM

## 2022-09-15 DIAGNOSIS — R11.2 NAUSEA AND VOMITING, UNSPECIFIED VOMITING TYPE: Primary | ICD-10-CM

## 2022-09-15 PROCEDURE — 99214 OFFICE O/P EST MOD 30 MIN: CPT | Performed by: FAMILY MEDICINE

## 2022-09-15 RX ORDER — MOMETASONE FUROATE 50 UG/1
2 SPRAY, METERED NASAL DAILY
Qty: 17 G | Refills: 2 | Status: SHIPPED | OUTPATIENT
Start: 2022-09-15 | End: 2022-09-23

## 2022-09-15 NOTE — PROGRESS NOTES
Assessment/Plan:       Problem List Items Addressed This Visit    None     Visit Diagnoses     Nausea and vomiting, unspecified vomiting type    -  Primary- reviewed ER note and labs  Continue current medications and she will review further at her f/u with GI tomorrow  She will continue to avoid trigger foods  Symptoms are improving  Post-nasal drip        Relevant Medications    Change flonase to mometasone (NASONEX) 50 mcg/act nasal spray                 Subjective:     Tiffany Braun is a 67 y o  female here today with chief complaint below:  Chief Complaint   Patient presents with    Follow-up     Pt was seen in the [facility] ER on 9/10/22, presented with increased reflux and heartburn  Pt states esophagus and sinuses were burning  Treated with GI meds and did a chest xray   Heartburn     Increased heartburn and reflux symptoms started on 9/9/22 in the evening and progressively got worse throughout the night  Went to the ER early Saturday morning, 9/10/22   Sinus Problem     Pt reports pressure and tenderness to the sinuses that began on 9/9/22 when pt experienced increased reflux and vomiting  States sinuses feel inflamed  - CC above per clinical staff and reviewed  HPI:  Pt here with her  for f/u on ER from 9/10/22    Woke up with vomiting, acidy liquid, upset stomach, feeling of heartburn  Tried mylanta with some relief  Went to the ER for eval when symptoms would not andrea  rec'd GI cocktail in the ER  Had labs/cxr/ekg  Neg troponins, creat at baseline  Normal wbc, hemoglobin improved from previous    She notes that she had been eating more acidic food than typical   Since her podiatric surgery her  has been helpful with doing more of the cooking since she has to be off her feet and was tending towards more easy foods to make like pasta with red sauce or pizza  Thinks this may have triggered her symptoms  Now she is eating a more bland diet      She was given prescription for carafate  The solution wasn't covered, so she was then given prescription for tablets to dissolve  She is taking this four times per day as prescribed  Has an appointment tomorrow with GI  She is feeling relief overall  She is getting some post nasal drip that is causing some nausea/gagging  She is using flonase, would like to try a different nasal spray  The following portions of the patient's history were reviewed and updated as appropriate: allergies, current medications, past family history, past medical history, past social history, past surgical history and problem list     ROS:  Review of Systems     Objective:      /68   Pulse 64   Temp (!) 97 4 °F (36 3 °C) (Temporal)   Resp 16   Ht 5' 3" (1 6 m)   Wt 76 7 kg (169 lb)   SpO2 96%   BMI 29 94 kg/m²   BP Readings from Last 3 Encounters:   09/15/22 112/68   09/10/22 158/77   08/11/22 126/74     Wt Readings from Last 3 Encounters:   09/15/22 76 7 kg (169 lb)   09/10/22 73 9 kg (163 lb)   08/11/22 72 6 kg (160 lb)               Physical Exam:   Physical Exam  Vitals and nursing note reviewed  Constitutional:       Appearance: Normal appearance  She is not ill-appearing  Neck:      Vascular: No carotid bruit  Cardiovascular:      Rate and Rhythm: Normal rate and regular rhythm  Heart sounds: No murmur heard  Pulmonary:      Effort: Pulmonary effort is normal       Breath sounds: Normal breath sounds  No wheezing or rhonchi  Abdominal:      Palpations: Abdomen is soft  Tenderness: There is no abdominal tenderness  There is no guarding or rebound  Musculoskeletal:      Comments: R foot in postop shoe   Neurological:      Mental Status: She is alert        Comments: Ambulating comfortably with walker

## 2022-09-16 DIAGNOSIS — R09.82 POST-NASAL DRIP: ICD-10-CM

## 2022-09-19 ENCOUNTER — APPOINTMENT (OUTPATIENT)
Dept: LAB | Facility: AMBULARY SURGERY CENTER | Age: 72
End: 2022-09-19
Payer: MEDICARE

## 2022-09-19 DIAGNOSIS — E78.5 DYSLIPIDEMIA: ICD-10-CM

## 2022-09-19 DIAGNOSIS — I12.9 HYPERTENSIVE CHRONIC KIDNEY DISEASE WITH STAGE 1 THROUGH STAGE 4 CHRONIC KIDNEY DISEASE, OR UNSPECIFIED CHRONIC KIDNEY DISEASE: ICD-10-CM

## 2022-09-19 DIAGNOSIS — D63.1 ANEMIA IN STAGE 4 CHRONIC KIDNEY DISEASE (HCC): ICD-10-CM

## 2022-09-19 DIAGNOSIS — E11.21 DIABETIC NEPHROPATHY ASSOCIATED WITH TYPE 2 DIABETES MELLITUS (HCC): ICD-10-CM

## 2022-09-19 DIAGNOSIS — Z87.442 HISTORY OF KIDNEY STONES: ICD-10-CM

## 2022-09-19 DIAGNOSIS — E55.9 VITAMIN D DEFICIENCY: ICD-10-CM

## 2022-09-19 DIAGNOSIS — N18.4 ANEMIA IN STAGE 4 CHRONIC KIDNEY DISEASE (HCC): ICD-10-CM

## 2022-09-19 DIAGNOSIS — N18.4 STAGE 4 CHRONIC KIDNEY DISEASE (HCC): ICD-10-CM

## 2022-09-19 DIAGNOSIS — R80.1 PERSISTENT PROTEINURIA: ICD-10-CM

## 2022-09-19 LAB
ALBUMIN SERPL BCP-MCNC: 3.2 G/DL (ref 3.5–5)
ALP SERPL-CCNC: 104 U/L (ref 46–116)
ALT SERPL W P-5'-P-CCNC: 57 U/L (ref 12–78)
ANION GAP SERPL CALCULATED.3IONS-SCNC: 6 MMOL/L (ref 4–13)
AST SERPL W P-5'-P-CCNC: 30 U/L (ref 5–45)
BILIRUB SERPL-MCNC: 0.38 MG/DL (ref 0.2–1)
BUN SERPL-MCNC: 33 MG/DL (ref 5–25)
CALCIUM ALBUM COR SERPL-MCNC: 9.6 MG/DL (ref 8.3–10.1)
CALCIUM SERPL-MCNC: 9 MG/DL (ref 8.3–10.1)
CHLORIDE SERPL-SCNC: 106 MMOL/L (ref 96–108)
CHOLEST SERPL-MCNC: 103 MG/DL
CK SERPL-CCNC: 49 U/L (ref 26–192)
CO2 SERPL-SCNC: 27 MMOL/L (ref 21–32)
CREAT SERPL-MCNC: 1.72 MG/DL (ref 0.6–1.3)
CREAT UR-MCNC: 84.6 MG/DL
ERYTHROCYTE [DISTWIDTH] IN BLOOD BY AUTOMATED COUNT: 15.3 % (ref 11.6–15.1)
GFR SERPL CREATININE-BSD FRML MDRD: 29 ML/MIN/1.73SQ M
GLUCOSE P FAST SERPL-MCNC: 164 MG/DL (ref 65–99)
HCT VFR BLD AUTO: 37.3 % (ref 34.8–46.1)
HDLC SERPL-MCNC: 37 MG/DL
HGB BLD-MCNC: 12.2 G/DL (ref 11.5–15.4)
LDLC SERPL CALC-MCNC: 21 MG/DL (ref 0–100)
MAGNESIUM SERPL-MCNC: 2.1 MG/DL (ref 1.6–2.6)
MCH RBC QN AUTO: 28.4 PG (ref 26.8–34.3)
MCHC RBC AUTO-ENTMCNC: 32.7 G/DL (ref 31.4–37.4)
MCV RBC AUTO: 87 FL (ref 82–98)
PHOSPHATE SERPL-MCNC: 4.2 MG/DL (ref 2.3–4.1)
PLATELET # BLD AUTO: 250 THOUSANDS/UL (ref 149–390)
PMV BLD AUTO: 11.8 FL (ref 8.9–12.7)
POTASSIUM SERPL-SCNC: 4.1 MMOL/L (ref 3.5–5.3)
PROT SERPL-MCNC: 6.7 G/DL (ref 6.4–8.4)
PROT UR-MCNC: 100 MG/DL
PROT/CREAT UR: 1.18 MG/G{CREAT} (ref 0–0.1)
PTH-INTACT SERPL-MCNC: 107.7 PG/ML (ref 18.4–80.1)
RBC # BLD AUTO: 4.29 MILLION/UL (ref 3.81–5.12)
SODIUM SERPL-SCNC: 139 MMOL/L (ref 135–147)
TRIGL SERPL-MCNC: 226 MG/DL
WBC # BLD AUTO: 8.2 THOUSAND/UL (ref 4.31–10.16)

## 2022-09-19 PROCEDURE — 80061 LIPID PANEL: CPT

## 2022-09-19 PROCEDURE — 36415 COLL VENOUS BLD VENIPUNCTURE: CPT

## 2022-09-19 PROCEDURE — 83735 ASSAY OF MAGNESIUM: CPT

## 2022-09-19 PROCEDURE — 82550 ASSAY OF CK (CPK): CPT

## 2022-09-19 PROCEDURE — 83970 ASSAY OF PARATHORMONE: CPT

## 2022-09-19 PROCEDURE — 84100 ASSAY OF PHOSPHORUS: CPT

## 2022-09-19 PROCEDURE — 80053 COMPREHEN METABOLIC PANEL: CPT

## 2022-09-19 PROCEDURE — 85027 COMPLETE CBC AUTOMATED: CPT

## 2022-09-19 PROCEDURE — 84156 ASSAY OF PROTEIN URINE: CPT

## 2022-09-19 PROCEDURE — 82570 ASSAY OF URINE CREATININE: CPT

## 2022-09-20 ENCOUNTER — OFFICE VISIT (OUTPATIENT)
Dept: PULMONOLOGY | Facility: CLINIC | Age: 72
End: 2022-09-20
Payer: MEDICARE

## 2022-09-20 VITALS
HEIGHT: 63 IN | RESPIRATION RATE: 18 BRPM | DIASTOLIC BLOOD PRESSURE: 68 MMHG | OXYGEN SATURATION: 99 % | BODY MASS INDEX: 29.77 KG/M2 | SYSTOLIC BLOOD PRESSURE: 118 MMHG | WEIGHT: 168 LBS | TEMPERATURE: 97.6 F | HEART RATE: 66 BPM

## 2022-09-20 DIAGNOSIS — J30.9 ALLERGIC RHINITIS, UNSPECIFIED SEASONALITY, UNSPECIFIED TRIGGER: Primary | ICD-10-CM

## 2022-09-20 DIAGNOSIS — G25.81 RESTLESS LEG SYNDROME: ICD-10-CM

## 2022-09-20 DIAGNOSIS — G47.33 OSA (OBSTRUCTIVE SLEEP APNEA): ICD-10-CM

## 2022-09-20 PROCEDURE — 99214 OFFICE O/P EST MOD 30 MIN: CPT | Performed by: INTERNAL MEDICINE

## 2022-09-20 RX ORDER — GABAPENTIN 300 MG/1
300 CAPSULE ORAL
Qty: 30 CAPSULE | Refills: 3 | Status: SHIPPED | OUTPATIENT
Start: 2022-09-20

## 2022-09-20 RX ORDER — MOMETASONE FUROATE 50 UG/1
SPRAY, METERED NASAL
Refills: 1 | OUTPATIENT
Start: 2022-09-20

## 2022-09-20 RX ORDER — AZELASTINE 1 MG/ML
1 SPRAY, METERED NASAL 2 TIMES DAILY
Qty: 30 ML | Refills: 3 | Status: SHIPPED | OUTPATIENT
Start: 2022-09-20

## 2022-09-20 NOTE — PROGRESS NOTES
Progress note - Pulmonary Medicine   Loyda Sequeira 67 y o  female MRN: [id number]       Impression & Plan:   72 y o  F with PMHx of DM, HTN, Hyperlipidemia and hiatal hernia s/p repair who comes in for follow up with recent respiratory failure requiring mechanical ventilation due to aspiration, BARBARA s/p UPPP on CPAP who comes in for Inspire initiation    1     Moderate BARBARA (AHI - 19 5) who previously failed autoCPAP s/p implantation of Inspire on 11/10/21   Here for device adjustment       -  She is more consistent with her device  She is currently set to stimulation level of level to 2 1 - 3 1  She did advance to 2 4V  I encouraged her to continue advancing the device further  She feels better  -  Follow home study in 4 months          -  She is aware of the risk of leaving sleep apnea untreated including hypertension, heart failure, arrhythmia, MI and stroke      2   RLS and significant neuropathy -  she states that this this is stable          -  Continue Neurontin 300mg PO at bedtime   I explained that she can consider increasing the dose       -  Continue Mirapex 0 25 qHS         3  Dyspnea on exertion and productive cough - hx COVID19 pneumonia  PFTs were normal besides weight related changes  Dyspnea most likely related to obesity and deconditioning         - I encouraged activity to encourage weight loss  ______________________________________________________________________    HPI:    Loyda Sequeira presents today for follow-up for her BARBARA  She states that she has been using inspire and it has been helping her sleep  She does feel that her sleep quality is better overall  She is tolerating the device better and is getting 4 hrs a night with the use of the device     She is willing to advance the stimulation further but wanted to discuss with me first   She does admit to some shortness of breath but it is usually in the morning where she does not feel she is getting a deep enough breath at night       Answers for HPI/ROS submitted by the patient on 2022  Do you experience frequent throat clearing?: Yes  When did you first notice your symptoms?: 1 to 4 weeks ago  Do you have shortness of breath that occurs with effort or exertion?: No  Do you have ear congestion?: No  Do you have heartburn?: Yes  Do you have fatigue?: Yes  Do you have nasal congestion?: Yes  Do you have shortness of breath when lying flat?: No  Do you have shortness of breath when you wake up?: Yes  Do you have sweats?: No  Have you experienced weight loss?: No        Review of Systems:  Review of Systems   Constitutional: Negative for appetite change and fever  HENT: Positive for postnasal drip and rhinorrhea  Negative for ear pain, sneezing, sore throat and trouble swallowing  Eyes: Negative  Respiratory: Positive for cough  Negative for chest tightness, shortness of breath and wheezing  Cardiovascular: Negative for chest pain  Endocrine: Negative  Genitourinary: Negative  Musculoskeletal: Positive for myalgias  Allergic/Immunologic: Negative  Neurological: Negative for headaches  Hematological: Negative  Psychiatric/Behavioral: Negative            Social history updates:  Social History     Tobacco Use   Smoking Status Former Smoker    Packs/day: 2 00    Years: 10 00    Pack years: 20 00    Types: Cigarettes    Start date: 1968   Wellington Daubs Quit date: 1976    Years since quittin 7   Smokeless Tobacco Never Used   Tobacco Comment    Smoked 2 pack a day     Social History     Socioeconomic History    Marital status: /Civil Union     Spouse name: Not on file    Number of children: Not on file    Years of education: Not on file    Highest education level: Not on file   Occupational History    Occupation: RETIRED   Tobacco Use    Smoking status: Former Smoker     Packs/day: 2 00     Years: 10 00     Pack years: 20 00     Types: Cigarettes     Start date: 1968     Quit date: 1976     Years since quittin 7    Smokeless tobacco: Never Used    Tobacco comment: Smoked 2 pack a day   Vaping Use    Vaping Use: Never used   Substance and Sexual Activity    Alcohol use: Yes     Comment: 1 or 2 a year    Drug use: No    Sexual activity: Not Currently     Partners: Male     Birth control/protection: Abstinence, Post-menopausal, Female Sterilization     Comment: defer   Other Topics Concern    Not on file   Social History Narrative    Not on file     Social Determinants of Health     Financial Resource Strain: Not on file   Food Insecurity: Not on file   Transportation Needs: Not on file   Physical Activity: Not on file   Stress: Not on file   Social Connections: Not on file   Intimate Partner Violence: Not on file   Housing Stability: Not on file       PhysicalExamination:  Vitals:   /68 (BP Location: Left arm, Patient Position: Sitting, Cuff Size: Adult)   Pulse 66   Temp 97 6 °F (36 4 °C) (Tympanic)   Resp 18   Ht 5' 3" (1 6 m)   Wt 76 2 kg (168 lb)   SpO2 99%   BMI 29 76 kg/m²   General: Pleasant, Awake alert and oriented x 3, conversant without conversational dyspnea, NAD, normal affect  HEENT:  PERRL, Sclera noninjected, nonicteric OU, Nares patent,  no craniofacial abnormalities, Mucous membranes, moist, no oral lesions, normal dentition, Mallampati class 4  NECK: Trachea midline, no accessory muscle use, no stridor, no cervical or supraclavicular adenopathy, JVP not elevated  CARDIAC: Reg, single s1/S2, no m/r/g  PULM: CTA bilaterally no wheezing, rhonchi or rales  ABD: Normoactive bowel sounds, soft nontender, nondistended, no rebound, no rigidity, no guarding  EXT: No cyanosis, no clubbing, no edema, normal capillary refill  NEURO: no focal neurologic deficits, AAOx3, moving all extremities appropriately      Diagnostic Data:  Labs:   I personally reviewed the most recent laboratory data pertinent to today's visit  I have personally reviewed pertinent lab results  Lab Results   Component Value Date    WBC 8 20 09/19/2022    HGB 12 2 09/19/2022    HCT 37 3 09/19/2022    MCV 87 09/19/2022     09/19/2022     Lab Results   Component Value Date    GLUCOSE 168 (H) 11/30/2018    CALCIUM 9 0 09/19/2022    K 4 1 09/19/2022    CO2 27 09/19/2022     09/19/2022    BUN 33 (H) 09/19/2022    CREATININE 1 72 (H) 09/19/2022     No results found for: IGE  Lab Results   Component Value Date    ALT 57 09/19/2022    AST 30 09/19/2022    ALKPHOS 104 09/19/2022     PFTs:  The most recent pulmonary function tests were reviewed  PFT with post bronchodilator 10/30/20  Study Interpretation:   · Reduced total lung capacity at 77% predicted with normal residual volume  · Normal airflow on vital capacity  · No significant improvement in airflow or vital capacity following the administration of bronchodilators  · Reduced diffusion capacity  · Normal airway resistance as indicated by the specific airway conductance      [place] in office 8/2/19  Prebronchodilator  FVC - 2 29 (79%)  FEV1 - 2 20 (83%)  FEV1/FVC - 76%     Post bronchodilator  FVC - 2 37 (82%)  FEV1 - 1 86 (84%)  FEV1/FVC -  78%  Very mild restriction      6 minute walk 8/2/19  Starting saturation - 94%   Starting HR - 74 bpm  Lowest saturation - 90%    Highest HR - 94 bpm  Ambulated - 252 m without the need for oxygen     Imaging  I personally reviewed the images on the [facility] system pertinent to today's visit  CT chest - 10/30/20  IMPRESSION:  Mild scarring/atelectasis in the lower lobes as mentioned above   No significant interstitial thickening or honeycombing or fibrotic changes are seen  No evidence of aspiration at this time  Hiatal hernia     Cardiac:  SUMMARY  LEFT VENTRICLE:  Systolic function was normal  Ejection fraction was estimated in the range of 55 %  Although no diagnostic regional wall motion abnormality was identified, this possibility cannot be completely excluded on the basis of this study    LEFT ATRIUM:  The atrium was dilated  MITRAL VALVE:  There was trace regurgitation      Sleep studies:  Diagnostic: 2011 -  AHI 13 1, severe hypoxia (sat 66%)     CPAP 5/8/19  Mrs Fuentes underwent a titration of CPAP to 10 cc of H2O pressure  She did well at 9 cc of H2O pressure in REM when in  the nonsupine position  However, she did not sleep much in the supine position  Therefore, I recommend a trial of autotitrating  CPAP 9 -20 cc of H2O pressure with Cflex 3 and heated humidification using a small Resmed Airfit F20  Compliance should be evaluated in 1-2 months  In addition, she had an increased number of limb movements (PLM index 35 7)  If she still has daytime sleepiness, I would  recommend pharmacologic treatment with Neurontin, Lyrica, Mirapex or Requip     Diagnostic Study with Matias Barahona 3/24/22  IMPRESSION:     Mrs Fuentes came in for Inspire titration   Mrs Fuentes slept poorly with fragmented sleep   Sleep architecture demonstrated delaeyd sleep latency   She did not demonstrate any REM sleep during this study   She was initiated on Inspire at 2 3V and underwent titration of  to 2 5 V   While there were no events noted at this stimulation level, there was no REM sleep and sleep was very fragmented   Therefore, I recommend switching voltage range to 2 2 - 3 2 V and starting at level 3 (2 4V)    I encourage her to continue to advance the device to optimize sleep quality    I recommend a repeat home study to ensure apnea is controlled in 3-4 months        Diagnostic study 3/30/21  (AHI) of 19 5 events per hour of sleep   The AHI in the supine position was 21 6   The AHI during REM sleep was 69  5       Compliance Data:  Type of CPAP: CPAP 10 - 20, mean 10 5, peak 18 5                                   8/17/20 - 11/17/20                                   Percent usage: 97%, > 4 hrs 82%                                   Average time used: 5 hrs 51 mins, up to 9 hrs 52 mins                                  Time in large leak: 2 mins 12 seconds                                   Residual AHI: 5 7     Compliance Data:  Type of CPAP: CPAP 10 - 20, mean 10 2, peak 12 5                                   6/27/20 - 7/26/20                                   Percent usage: 50%, > 4 hrs 26%                                   Average time used: 2 hrs 29 mins, up to 9 hrs 2 mins                                  Time in large leak: 12 mins 16 seconds                                   Residual AHI: 3 2     Compliance Data:  Type of CPAP: CPAP 10 - 20 8/17/19 - 11/14/19, mean 10 5, Max 17 1                                   Percent usage: 100%, > 4 hrs 83%                                   Average time used: 5 hrs 48 mins, up to 9 hrs 27 mins                                  Time in large leak: 3 mins 8 seconds                                   Residual AHI: 6 5     Compliance Data:  Type of CPAP: CPAP 9 - 20 6/29/19 - 7/28/19, mean 10 2, Max 14 1                                   Percent usage: 100%                                   Average time used: 6 hrs 22 mins, up to 8 hrs 32 mins                                  Time in large leak: 32 seconds                                   Residual AHI: 4 9        Compliance Data:  Type of CPAP: CPAP 12 3/5/19 - 4/3/19                                   Percent usage: 100%                                   Average time used: 5 hrs 40 mins, up to 8 hrs 32 mins                                  Time in large leak: 7 mins                                   Residual AHI: 2 5        Compliance Data:  Type of CPAP:  10                                   Average time used: 2 hrs                                   Residual AHI: 2 2       Galo Jaimes DO

## 2022-09-20 NOTE — LETTER
September 20, 2022     [facility] Aga [facility]rafDr[facility]  [address]  [address] [place] [address]    Patient: Lourdes Huang   YOB: 1950   Date of Visit: 9/20/2022       Dear Dr Yakelin Zendejas: Thank you for referring Monika Pinedo to me for evaluation  Below are my notes for this consultation  If you have questions, please do not hesitate to call me  I look forward to following your patient along with you  Sincerely,        Kasey Haddad DO        CC: No Recipients  Kasey Haddad DO  9/20/2022 12:47 PM  Sign when Signing Visit  Progress note - Pulmonary Medicine   Lourdes Huang 67 y o  female MRN: [id number]       Impression & Plan:   72 y o  F with PMHx of DM, HTN, Hyperlipidemia and hiatal hernia s/p repair who comes in for follow up with recent respiratory failure requiring mechanical ventilation due to aspiration, BARBARA s/p UPPP on CPAP who comes in for Inspire initiation    1     Moderate BARBARA (AHI - 19 5) who previously failed autoCPAP s/p implantation of Inspire on 11/10/21   Here for device adjustment       -  She is more consistent with her device  She is currently set to stimulation level of level to 2 1 - 3 1  She did advance to 2 4V  I encouraged her to continue advancing the device further  She feels better  -  Follow home study in 4 months          -  She is aware of the risk of leaving sleep apnea untreated including hypertension, heart failure, arrhythmia, MI and stroke      2   RLS and significant neuropathy -  she states that this this is stable          -  Continue Neurontin 300mg PO at bedtime   I explained that she can consider increasing the dose       -  Continue Mirapex 0 25 qHS         3  Dyspnea on exertion and productive cough - hx COVID19 pneumonia  PFTs were normal besides weight related changes  Dyspnea most likely related to obesity and deconditioning         - I encouraged activity to encourage weight loss  ______________________________________________________________________    HPI:    Shelly Owens presents today for follow-up for her BARBARA  She states that she has been using inspire and it has been helping her sleep  She does feel that her sleep quality is better overall  She is tolerating the device better and is getting 4 hrs a night with the use of the device  She is willing to advance the stimulation further but wanted to discuss with me first   She does admit to some shortness of breath but it is usually in the morning where she does not feel she is getting a deep enough breath at night  Answers for HPI/ROS submitted by the patient on 9/20/2022  Do you experience frequent throat clearing?: Yes  When did you first notice your symptoms?: 1 to 4 weeks ago  Do you have shortness of breath that occurs with effort or exertion?: No  Do you have ear congestion?: No  Do you have heartburn?: Yes  Do you have fatigue?: Yes  Do you have nasal congestion?: Yes  Do you have shortness of breath when lying flat?: No  Do you have shortness of breath when you wake up?: Yes  Do you have sweats?: No  Have you experienced weight loss?: No        Review of Systems:  Review of Systems   Constitutional: Negative for appetite change and fever  HENT: Positive for postnasal drip and rhinorrhea  Negative for ear pain, sneezing, sore throat and trouble swallowing  Eyes: Negative  Respiratory: Positive for cough  Negative for chest tightness, shortness of breath and wheezing  Cardiovascular: Negative for chest pain  Endocrine: Negative  Genitourinary: Negative  Musculoskeletal: Positive for myalgias  Allergic/Immunologic: Negative  Neurological: Negative for headaches  Hematological: Negative  Psychiatric/Behavioral: Negative            Social history updates:  Social History     Tobacco Use   Smoking Status Former Smoker    Packs/day: 2 00    Years: 10 00    Pack years: 20 00    Types: Cigarettes  Start date: 1968   Anne-Marie Reeder Quit date: 1976    Years since quittin 7   Smokeless Tobacco Never Used   Tobacco Comment    Smoked 2 pack a day     Social History     Socioeconomic History    Marital status: /Civil Union     Spouse name: Not on file    Number of children: Not on file    Years of education: Not on file    Highest education level: Not on file   Occupational History    Occupation: RETIRED   Tobacco Use    Smoking status: Former Smoker     Packs/day: 2 00     Years: 10 00     Pack years: 20 00     Types: Cigarettes     Start date: 1968     Quit date: 1976     Years since quittin 7    Smokeless tobacco: Never Used    Tobacco comment: Smoked 2 pack a day   Vaping Use    Vaping Use: Never used   Substance and Sexual Activity    Alcohol use: Yes     Comment: 1 or 2 a year    Drug use: No    Sexual activity: Not Currently     Partners: Male     Birth control/protection: Abstinence, Post-menopausal, Female Sterilization     Comment: defer   Other Topics Concern    Not on file   Social History Narrative    Not on file     Social Determinants of Health     Financial Resource Strain: Not on file   Food Insecurity: Not on file   Transportation Needs: Not on file   Physical Activity: Not on file   Stress: Not on file   Social Connections: Not on file   Intimate Partner Violence: Not on file   Housing Stability: Not on file       PhysicalExamination:  Vitals:   /68 (BP Location: Left arm, Patient Position: Sitting, Cuff Size: Adult)   Pulse 66   Temp 97 6 °F (36 4 °C) (Tympanic)   Resp 18   Ht 5' 3" (1 6 m)   Wt 76 2 kg (168 lb)   SpO2 99%   BMI 29 76 kg/m²   General: Pleasant, Awake alert and oriented x 3, conversant without conversational dyspnea, NAD, normal affect  HEENT:  PERRL, Sclera noninjected, nonicteric OU, Nares patent,  no craniofacial abnormalities, Mucous membranes, moist, no oral lesions, normal dentition, Mallampati class 4  NECK: Trachea midline, no accessory muscle use, no stridor, no cervical or supraclavicular adenopathy, JVP not elevated  CARDIAC: Reg, single s1/S2, no m/r/g  PULM: CTA bilaterally no wheezing, rhonchi or rales  ABD: Normoactive bowel sounds, soft nontender, nondistended, no rebound, no rigidity, no guarding  EXT: No cyanosis, no clubbing, no edema, normal capillary refill  NEURO: no focal neurologic deficits, AAOx3, moving all extremities appropriately      Diagnostic Data:  Labs: I personally reviewed the most recent laboratory data pertinent to today's visit  I have personally reviewed pertinent lab results  Lab Results   Component Value Date    WBC 8 20 09/19/2022    HGB 12 2 09/19/2022    HCT 37 3 09/19/2022    MCV 87 09/19/2022     09/19/2022     Lab Results   Component Value Date    GLUCOSE 168 (H) 11/30/2018    CALCIUM 9 0 09/19/2022    K 4 1 09/19/2022    CO2 27 09/19/2022     09/19/2022    BUN 33 (H) 09/19/2022    CREATININE 1 72 (H) 09/19/2022     No results found for: IGE  Lab Results   Component Value Date    ALT 57 09/19/2022    AST 30 09/19/2022    ALKPHOS 104 09/19/2022     PFTs:  The most recent pulmonary function tests were reviewed  PFT with post bronchodilator 10/30/20  Study Interpretation:   · Reduced total lung capacity at 77% predicted with normal residual volume  · Normal airflow on vital capacity  · No significant improvement in airflow or vital capacity following the administration of bronchodilators  · Reduced diffusion capacity    · Normal airway resistance as indicated by the specific airway conductance      [place] in office 8/2/19  Prebronchodilator  FVC - 2 29 (79%)  FEV1 - 2 20 (83%)  FEV1/FVC - 76%     Post bronchodilator  FVC - 2 37 (82%)  FEV1 - 1 86 (84%)  FEV1/FVC -  78%  Very mild restriction      6 minute walk 8/2/19  Starting saturation - 94%   Starting HR - 74 bpm  Lowest saturation - 90%    Highest HR - 94 bpm  Ambulated - 252 m without the need for oxygen     Imaging  I personally reviewed the images on the [facility] system pertinent to today's visit  CT chest - 10/30/20  IMPRESSION:  Mild scarring/atelectasis in the lower lobes as mentioned above   No significant interstitial thickening or honeycombing or fibrotic changes are seen  No evidence of aspiration at this time  Hiatal hernia     Cardiac:  SUMMARY  LEFT VENTRICLE:  Systolic function was normal  Ejection fraction was estimated in the range of 55 %  Although no diagnostic regional wall motion abnormality was identified, this possibility cannot be completely excluded on the basis of this study  LEFT ATRIUM:  The atrium was dilated  MITRAL VALVE:  There was trace regurgitation      Sleep studies:  Diagnostic: 2011 -  AHI 13 1, severe hypoxia (sat 66%)     CPAP 5/8/19  Mrs Fuentes underwent a titration of CPAP to 10 cc of H2O pressure  She did well at 9 cc of H2O pressure in REM when in  the nonsupine position  However, she did not sleep much in the supine position  Therefore, I recommend a trial of autotitrating  CPAP 9 -20 cc of H2O pressure with Cflex 3 and heated humidification using a small Resmed Airfit F20  Compliance should be evaluated in 1-2 months  In addition, she had an increased number of limb movements (PLM index 35 7)  If she still has daytime sleepiness, I would  recommend pharmacologic treatment with Neurontin, Lyrica, Mirapex or Requip     Diagnostic Study with Cherene Troup 3/24/22  IMPRESSION:     Mrs Fuentes came in for Inspire titration   Mrs Fuentes slept poorly with fragmented sleep   Sleep architecture demonstrated delaeyd sleep latency   She did not demonstrate any REM sleep during this study   She was initiated on Inspire at 2 3V and underwent titration of  to 2 5 V   While there were no events noted at this stimulation level, there was no REM sleep and sleep was very fragmented   Therefore, I recommend switching voltage range to 2 2 - 3 2 V and starting at level 3 (2 4V)     I encourage her to continue to advance the device to optimize sleep quality    I recommend a repeat home study to ensure apnea is controlled in 3-4 months        Diagnostic study 3/30/21  (AHI) of 19 5 events per hour of sleep   The AHI in the supine position was 21 6   The AHI during REM sleep was 69  5       Compliance Data:  Type of CPAP: CPAP 10 - 20, mean 10 5, peak 18 5                                   8/17/20 - 11/17/20                                   Percent usage: 97%, > 4 hrs 82%                                   Average time used: 5 hrs 51 mins, up to 9 hrs 52 mins                                  Time in large leak: 2 mins 12 seconds                                   Residual AHI: 5 7     Compliance Data:  Type of CPAP: CPAP 10 - 20, mean 10 2, peak 12 5                                   6/27/20 - 7/26/20                                   Percent usage: 50%, > 4 hrs 26%                                   Average time used: 2 hrs 29 mins, up to 9 hrs 2 mins                                  Time in large leak: 12 mins 16 seconds                                   Residual AHI: 3 2     Compliance Data:  Type of CPAP: CPAP 10 - 20                                    8/17/19 - 11/14/19, mean 10 5, Max 17 1                                   Percent usage: 100%, > 4 hrs 83%                                   Average time used: 5 hrs 48 mins, up to 9 hrs 27 mins                                  Time in large leak: 3 mins 8 seconds                                   Residual AHI: 6 5     Compliance Data:  Type of CPAP: CPAP 9 - 20 6/29/19 - 7/28/19, mean 10 2, Max 14 1                                   Percent usage: 100%                                   Average time used: 6 hrs 22 mins, up to 8 hrs 32 mins                                  Time in large leak: 32 seconds                                   Residual AHI: 4 9        Compliance Data:  Type of CPAP: CPAP 12 3/5/19 - 4/3/19                                   Percent usage: 100%                                   Average time used: 5 hrs 40 mins, up to 8 hrs 32 mins                                  Time in large leak: 7 mins                                   Residual AHI: 2 5        Compliance Data:  Type of CPAP:  10                                   Average time used: 2 hrs                                   Residual AHI: 2 2       Teodoro Solomon DO

## 2022-09-23 ENCOUNTER — OFFICE VISIT (OUTPATIENT)
Dept: GASTROENTEROLOGY | Facility: CLINIC | Age: 72
End: 2022-09-23
Payer: MEDICARE

## 2022-09-23 VITALS
BODY MASS INDEX: 29.59 KG/M2 | WEIGHT: 167 LBS | SYSTOLIC BLOOD PRESSURE: 141 MMHG | HEIGHT: 63 IN | OXYGEN SATURATION: 95 % | HEART RATE: 60 BPM | DIASTOLIC BLOOD PRESSURE: 87 MMHG | TEMPERATURE: 97.7 F

## 2022-09-23 DIAGNOSIS — E11.43 GASTROPARESIS DIABETICORUM (HCC): Primary | ICD-10-CM

## 2022-09-23 DIAGNOSIS — K21.9 GASTROESOPHAGEAL REFLUX DISEASE, UNSPECIFIED WHETHER ESOPHAGITIS PRESENT: ICD-10-CM

## 2022-09-23 DIAGNOSIS — K31.84 GASTROPARESIS DIABETICORUM (HCC): Primary | ICD-10-CM

## 2022-09-23 PROCEDURE — 99213 OFFICE O/P EST LOW 20 MIN: CPT | Performed by: PHYSICIAN ASSISTANT

## 2022-09-23 RX ORDER — PANTOPRAZOLE SODIUM 40 MG/1
40 TABLET, DELAYED RELEASE ORAL 2 TIMES DAILY
Qty: 90 TABLET | Refills: 2 | Status: SHIPPED | OUTPATIENT
Start: 2022-09-23 | End: 2022-10-30

## 2022-09-23 NOTE — PATIENT INSTRUCTIONS
You can try Gaviscon for severe reflux symptoms      Start taking Protonix 40 mg two times a day for a month then taper back down to your normal

## 2022-09-23 NOTE — PROGRESS NOTES
[facility] - Outpatient Follow-up Note  Sasha Ardon 67 y o  female MRN: [id number]  Encounter: [id number]          ASSESSMENT AND PLAN:      1  GERD  2  Diabetic gastroparesis  Patient had 1 ER visit last week for symptoms of severe reflux  This was after making significant dietary changes as her  was cooking food which consisted mostly of spaghetti and pizza  Symptoms have improved however she does still have upper abdominal discomfort and burning after eating  She is trying to follow more strictly gastroparesis and reflux diet now  She is not taking NSAIDs  No constipation     -symptom worsening seems associated with change in dietary habits  She was following gastroparesis diet well however after her recent foot surgery her  took a place of cooking therefore she has been eating some foods high in fatty and tomato based content     -patient also likely has significant reflux symptoms in the setting of hiatal hernia and gastroparesis  -recommend increasing PPI to b i d  dosing for 4 weeks, then taper down to her normal regimen of PPI in the morning and Pepcid at bedtime if doing well   -recommend Gaviscon for patient to take as needed     -continue to avoid late night eating and recommend waiting at least 3-4 hours before lying down after eating     -recommend strictly following gastroparesis and GERD diet for now as symptoms are more significant     -continue strict glucose control     -could consider repeat EGD if symptoms are persistent or any alarm symptoms occur, however recently had 1 within 4 months with moderate gastritis and bile reflux noted therefore no indication at this time  Follow-up in 3 months    Patient requesting to see Dr Nettie Francis     ______________________________________________________________________    SUBJECTIVE:      Sasha Ardon is a 79-year-old female with past medical history of diabetic gastroparesis, type 2 diabetes, previous hiatal hernia status post fundoplication many years ago with repeat repair 5 years ago and recurrent hernia, CKD who presents for follow-up  Patient was last in the office around 1 month ago after her 2nd treatment of EGD with Botox  She was doing generally well at that time on PPI in the morning and famotidine at bedtime  She went to the emergency room 09/10/2022 for worsening reflux symptoms  Lab work at that time with elevated glucose, normal liver enzymes, normal lipase, normal CBC  She was given GI cocktail with relief of her symptoms  Patient report about 9 weeks ago she had a procedure done on her foot  Because she had decreased mobility at that time her has been was making dinners for her  Therefore, she went from eating her normal foods to foods such as spaghetti and pasta  She then had worsening reflux symptoms for 3-4 days in a row  She would wake up with coughing and reflux going up into her mouth and sinuses  This prompted her to go to the emergency room as noted above  Since her ER visit she reports the symptoms are now improved  She continues with Protonix in the morning and Pepcid at nighttime and will take Mylanta occasionally if needed  She does still have some upper abdominal discomfort which she describes as a burning pain after eating  She has been trying to go back to her regular diet and avoiding tomato based foods and chocolate as well as not eating close to bedtime  She denies any constipation  She was given Carafate after discharge and took this a few times however then stopped  Last EGD with Botox was 05/2022  Last colonoscopy 10/2021 with few polyps removed and repeat recommended in 3 years  REVIEW OF SYSTEMS IS OTHERWISE NEGATIVE        Historical Information   Past Medical History:   Diagnosis Date    Allergic     Allergic rhinitis     Anesthesia     pt reports "had to use double lumen endo tube for hiatal hernia repair/so surgeon could get to where he needed to work"    Arthritis     Aspiration into airway     Basal cell carcinoma 2007    left cheek     BCC (basal cell carcinoma) 05/27/2021    Left Nasal tip    Cancer [facility] [address] [facility]     squamous cell cancer on forhead    Cancer ([facility] [address] )     basal cell on nose     Chronic kidney disease 2000, 2018    Stones, kidney disease stage 4    Chronic pain disorder     bilat feet and joint pain on occas    Colon polyp     Constipation     COPD (chronic obstructive pulmonary disease) ([facility] [address] [facility]     COVID-19 08/2021    recovered at home/did receive monoclonal infusion    Dental bridge present     Depression     Diabetes [facility]     Type 2    Diabetic neuropathy [facility]     Disease of thyroid gland     Family history of thyroid problem     Fatty liver     GERD (gastroesophageal reflux disease)     Heart burn     Hiatal hernia     History of pneumonia     Hyperlipidemia     Hyperplasia, parathyroid 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, left     great toe fusion    Urinary incontinence     Urinary tract infection 03/28/2022    Wears glasses      Past Surgical History:   Procedure Laterality Date    ARTHROSCOPY KNEE      BREAST BIOPSY      stereotactic-benign    BREAST BIOPSY      stereo-benign    BREAST EXCISIONAL BIOPSY      unknown date-benign    BREAST EXCISIONAL BIOPSY      unknown date-benign    BREAST EXCISIONAL BIOPSY      unknown date-benign    BREAST EXCISIONAL BIOPSY      unknown age-benign    CARDIAC CATHETERIZATION      CHOLECYSTECTOMY      COLONOSCOPY  EXAMINATION UNDER ANESTHESIA N/A 2021    Procedure: EXAM UNDER ANESTHESIA (EUA), DISE;  Surgeon: Lico Huerta MD;  [facility];  Service: ENT    HERNIA REPAIR      HIATAL HERNIA REPAIR      KNEE SURGERY      Torn maniscus lap surg    LIPOSUCTION      LYMPH NODE BIOPSY      MOHS SURGERY  2021    left mid forehead-Gautam    MOHS SURGERY Left 2021    Left nasal tip- gautam     LA FOOT/TOES SURGERY PROC UNLISTED Right 2022    Procedure: 1st metatarsal phalangeal joint fusion;  Surgeon: Emile Ordaz DPM;  Location[facility];  Service: Podiatry    LA INCISION IMPLANT CRANIAL NERVE STIM ELECTRODE/PULSE GEN N/A 11/10/2021    Procedure: INSERTION UPPER AIRWAY STIMULATOR, INSPIRE IMPLANT;  Surgeon: Lico Huerta MD;  Location[facility];  Service: ENT    REDUCTION MAMMAPLASTY Bilateral 2000    REDUCTION MAMMAPLASTY      SKIN BIOPSY      SKIN CANCER EXCISION  2007    squamous cell carcinoma     SKIN CANCER EXCISION  2007    basal cell carcinoma    SQUAMOUS CELL CARCINOMA EXCISION      TOE SURGERY Right 2022    Right Great Toe Fusion    TONSILLECTOMY      TUBAL LIGATION      UPPER GASTROINTESTINAL ENDOSCOPY      US GUIDED BREAST BIOPSY RIGHT COMPLETE Right 2022     Social History   Social History     Substance and Sexual Activity   Alcohol Use Yes    Comment: 1 or 2 a year     Social History     Substance and Sexual Activity   Drug Use No     Social History     Tobacco Use   Smoking Status Former Smoker    Packs/day: 2 00    Years: 10 00    Pack years: 20 00    Types: Cigarettes    Start date: 1968   Tracey Mon Quit date: 1976    Years since quittin 7   Smokeless Tobacco Never Used   Tobacco Comment    Smoked 2 pack a day     Family History   Problem Relation Age of Onset    Heart disease Mother     Depression Mother     Hypertension Mother     COPD Mother     Hearing loss Mother     Anxiety disorder Mother     Heart disease Father     Lung cancer Father 79        Smoker     Cancer Father         brain    Alcohol abuse Father     Dementia Father     Hypertension Father     Thyroid disease Father     COPD Father     Arthritis Father     Brain cancer Father 76    Hypertension Sister     Diabetes Sister     Heart disease Sister     Thyroid disease Sister    Evelyn Jarrell Cancer Sister         Lympoma, lung    Lung cancer Sister 78    Anxiety disorder Sister     Hypertension Brother     Diabetes Brother     Cancer Brother         Throat    Dementia Brother     Stroke Brother     Hypertension Brother     Heart disease Brother     Diabetes Brother     No Known Problems Son     No Known Problems Son     Brain cancer Paternal [de-identified]         unknown age   Evelyn Jarrell Breast cancer Neg Hx        Meds/Allergies       Current Outpatient Medications:     acetaminophen (TYLENOL) 325 mg tablet    albuterol (Ventolin HFA) 90 mcg/act inhaler    amLODIPine (NORVASC) 5 mg tablet    azelastine (ASTELIN) 0 1 % nasal spray    b complex vitamins capsule    B-D ULTRAFINE III SHORT PEN 31G X 8 MM MISC    Biotin w/ Vitamins C & E (HAIR SKIN & NAILS GUMMIES PO)    DULoxetine (CYMBALTA) 30 mg delayed release capsule    famotidine (PEPCID) 10 mg tablet    gabapentin (Neurontin) 300 mg capsule    insulin aspart (NovoLOG) 100 units/mL injection    insulin detemir (Levemir FlexTouch) 100 Units/mL injection pen    Krill Oil (Omega-3) 500 MG CAPS    levothyroxine 112 mcg tablet    metoprolol tartrate (LOPRESSOR) 50 mg tablet    olmesartan (BENICAR) 20 mg tablet    pantoprazole (PROTONIX) 40 mg tablet    pramipexole (MIRAPEX) 0 25 mg tablet    Probiotic Product (PROBIOTIC PO)    rosuvastatin (CRESTOR) 20 MG tablet    torsemide (DEMADEX) 20 mg tablet    Vitamin D, Ergocalciferol, 2000 units CAPS    BIOTIN PO    calcium carbonate (TUMS) 500 mg chewable tablet    sucralfate (CARAFATE) 1 g tablet    Allergies   Allergen Reactions    Sulfamethoxazole-Trimethoprim Other (See Comments)     Tongue swelling    Ciprofloxacin Hives and Itching           Objective     Blood pressure 141/87, pulse 60, temperature 97 7 °F (36 5 °C), height 5' 3" (1 6 m), weight 75 8 kg (167 lb), SpO2 95 %  Body mass index is 29 58 kg/m²  PHYSICAL EXAM:      General Appearance:   Alert, cooperative, no distress  Uses walker   HEENT:   Normocephalic, atraumatic, anicteric      Neck:  Supple, symmetrical, trachea midline   Lungs:   Clear to auscultation bilaterally; no rales, rhonchi or wheezing; respirations unlabored    Heart[de-identified]   Regular rate and rhythm; no murmur, rub, or gallop  Abdomen:   Soft, non-tender, non-distended; normal bowel sounds; no masses, no organomegaly    Genitalia:   Deferred    Rectal:   Deferred    Extremities:  +right foot boot  No edema   Pulses:  2+ and symmetric    Skin:  No jaundice, rashes, or lesions    Lymph nodes:  No palpable cervical lymphadenopathy        Lab Results:   No visits with results within 1 Day(s) from this visit     Latest known visit with results is:   Appointment on 09/19/2022   Component Date Value    Sodium 09/19/2022 139     Potassium 09/19/2022 4 1     Chloride 09/19/2022 106     CO2 09/19/2022 27     ANION GAP 09/19/2022 6     BUN 09/19/2022 33 (A)    Creatinine 09/19/2022 1 72 (A)    Glucose, Fasting 09/19/2022 164 (A)    Calcium 09/19/2022 9 0     Corrected Calcium 09/19/2022 9 6     AST 09/19/2022 30     ALT 09/19/2022 57     Alkaline Phosphatase 09/19/2022 104     Total Protein 09/19/2022 6 7     Albumin 09/19/2022 3 2 (A)    Total Bilirubin 09/19/2022 0 38     eGFR 09/19/2022 29     WBC 09/19/2022 8 20     RBC 09/19/2022 4 29     Hemoglobin 09/19/2022 12 2     Hematocrit 09/19/2022 37 3     MCV 09/19/2022 87     MCH 09/19/2022 28 4     MCHC 09/19/2022 32 7     RDW 09/19/2022 15 3 (A)    Platelets 88/26/6995 250     MPV 09/19/2022 11 8     Total CK 09/19/2022 49     Cholesterol 09/19/2022 103     Triglycerides 09/19/2022 226 (A)    HDL, Direct 09/19/2022 37 (A)    LDL Calculated 09/19/2022 21     Magnesium 09/19/2022 2 1     Phosphorus 09/19/2022 4 2 (A)    Creatinine, Ur 09/19/2022 84 6     Protein Urine Random 09/19/2022 100     Prot/Creat Ratio, Ur 09/19/2022 1 18 (A)    PTH 09/19/2022 107 7 (A)         Radiology Results:   XR chest 2 views    Result Date: 9/10/2022  Narrative: CHEST INDICATION:   cough  COMPARISON:  7/20/2022 EXAM PERFORMED/VIEWS:  XR CHEST PA & LATERAL FINDINGS:  Right-sided stimulator device body is unchanged in position  The lead extends towards the neck  Cardiomediastinal silhouette appears unremarkable  The lungs are clear  No pneumothorax or pleural effusion  Osseous structures appear within normal limits for patient age  Impression: No acute cardiopulmonary disease   Workstation performed: [id number]

## 2022-09-27 ENCOUNTER — OFFICE VISIT (OUTPATIENT)
Dept: UROLOGY | Facility: CLINIC | Age: 72
End: 2022-09-27
Payer: MEDICARE

## 2022-09-27 ENCOUNTER — PATIENT MESSAGE (OUTPATIENT)
Dept: UROLOGY | Facility: CLINIC | Age: 72
End: 2022-09-27

## 2022-09-27 VITALS
HEIGHT: 63 IN | SYSTOLIC BLOOD PRESSURE: 136 MMHG | BODY MASS INDEX: 29.41 KG/M2 | DIASTOLIC BLOOD PRESSURE: 78 MMHG | WEIGHT: 166 LBS

## 2022-09-27 DIAGNOSIS — N39.0 RECURRENT UTI: Primary | ICD-10-CM

## 2022-09-27 DIAGNOSIS — N39.0 RECURRENT UTI: ICD-10-CM

## 2022-09-27 LAB — POST-VOID RESIDUAL VOLUME, ML POC: 108 ML

## 2022-09-27 PROCEDURE — 99203 OFFICE O/P NEW LOW 30 MIN: CPT | Performed by: PHYSICIAN ASSISTANT

## 2022-09-27 PROCEDURE — 51798 US URINE CAPACITY MEASURE: CPT | Performed by: PHYSICIAN ASSISTANT

## 2022-09-27 RX ORDER — ESTRADIOL 0.1 MG/G
1 CREAM VAGINAL 3 TIMES WEEKLY
Qty: 42.5 G | Refills: 3 | Status: SHIPPED | OUTPATIENT
Start: 2022-09-28 | End: 2022-09-27 | Stop reason: SDUPTHER

## 2022-09-27 NOTE — TELEPHONE ENCOUNTER
From: Michael Murray  To: Theodore Oliveira PA-C  Sent: 9/27/2022 1:33 PM EDT  Subject: Estradiol     Hi Stafford  The prescription of Estradiol is cheaper at [facility] [address] in [place]  Would you please send a new script to them   Thanks

## 2022-09-27 NOTE — PROGRESS NOTES
1  Recurrent UTI  POCT urine dip    POCT Measure PVR    estradiol (ESTRACE VAGINAL) 0 1 mg/g vaginal cream       Assessment and plan:       1  Recurrent UTI  - proper hydration, avoidance of constipation, probiotic, tight glycemic control  - start estrace 3x weekly  - will call PRN for infections  - f/u 6 months for reassessment    2  Nephrolithiasis   - patient's stones are intraparenchymal & stable       Phyllis Miranda PA-C      Chief Complaint     Recurrent UTIs    History of Present Illness     Tripp Harrison is a 67 y o  female presenting today for consultation of recurrent urinary infections  Symptoms typically consist of dysuria  Symptoms do resolve with antibiotics however typically returns a few weeks later  Urine cultures:  + Enterobacter cloacae 3/30/22  + Enterobacter cloacae 4/13/22  + Enterobacter cloacae 6/30/22  + Enterobacter cloacae 8/11/22    Recent CT 3/30/22 showing nonosbtructing intrarenal stones, largest 5mm, + hemorrhagic cysts  Stones appear to be parenchymal  Patient previously followed with Dr Estefania Sexton at [facility], last seen 2020  Left upper pole stones were stable dating to then  Had previously attempted ureteroscopy for said stones however they were intraparenchymal     Previous urge incontinence that had briefly been on oxybutynin for  Previously d/c'ed due to dry mouth  Medical comorbidities include GERD, gastroparesis, constipation, type 2 diabetes, sleep apnea, COPD, pulmonary hypertension, chronic kidney disease, fibromyalgia, depression  Unable to provide urine specimen  PVR 108mL    Laboratory     Lab Results   Component Value Date    CREATININE 1 72 (H) 09/19/2022       Review of Systems     Review of Systems   Constitutional: Negative for activity change, appetite change, chills, diaphoresis, fatigue, fever and unexpected weight change  Respiratory: Negative for chest tightness and shortness of breath      Cardiovascular: Negative for chest pain, palpitations and leg swelling  Gastrointestinal: Negative for abdominal distention, abdominal pain, constipation, diarrhea, nausea and vomiting  Genitourinary: Negative for decreased urine volume, difficulty urinating, dysuria, enuresis, flank pain, frequency, genital sores, hematuria and urgency  Musculoskeletal: Negative for back pain, gait problem and myalgias  Skin: Negative for color change, pallor, rash and wound  Psychiatric/Behavioral: Negative for behavioral problems  The patient is not nervous/anxious  Allergies     Allergies   Allergen Reactions    Sulfamethoxazole-Trimethoprim Other (See Comments)     Tongue swelling    Ciprofloxacin Hives and Itching       Physical Exam     Physical Exam  Constitutional:       General: She is not in acute distress  Appearance: Normal appearance  She is normal weight  She is not ill-appearing, toxic-appearing or diaphoretic  HENT:      Head: Normocephalic and atraumatic  Eyes:      General:         Right eye: No discharge  Left eye: No discharge  Conjunctiva/sclera: Conjunctivae normal    Pulmonary:      Effort: Pulmonary effort is normal  No respiratory distress  Musculoskeletal:         General: No swelling or tenderness  Comments: Ambulates with walker assistance   Skin:     General: Skin is warm and dry  Coloration: Skin is not jaundiced or pale  Neurological:      General: No focal deficit present  Mental Status: She is alert and oriented to person, place, and time  Psychiatric:         Mood and Affect: Mood normal          Behavior: Behavior normal          Thought Content:  Thought content normal        Vital Signs     Vitals:    09/27/22 1102   BP: 136/78   BP Location: Left arm   Patient Position: Sitting   Cuff Size: Adult   Weight: 75 3 kg (166 lb)   Height: 5' 3" (1 6 m)     Current Medications       Current Outpatient Medications:     acetaminophen (TYLENOL) 325 mg tablet, Take 2 tablets (650 mg total) by mouth every 6 (six) hours as needed for mild pain, headaches or fever, Disp: , Rfl: 0    albuterol (Ventolin HFA) 90 mcg/act inhaler, Inhale 2 puffs every 6 (six) hours as needed for wheezing, Disp: 54 g, Rfl: 0    amLODIPine (NORVASC) 5 mg tablet, TAKE 1 TABLET (5 MG TOTAL) BY MOUTH DAILY  , Disp: 90 tablet, Rfl: 0    azelastine (ASTELIN) 0 1 % nasal spray, 1 spray into each nostril 2 (two) times a day Use in each nostril as directed, Disp: 30 mL, Rfl: 3    b complex vitamins capsule, Take 1 capsule by mouth daily, Disp: , Rfl:     B-D ULTRAFINE III SHORT PEN 31G X 8 MM MISC, USE AS DIRECTED 4 TIMES PER DAY, Disp: , Rfl: 3    Biotin w/ Vitamins C & E (HAIR SKIN & NAILS GUMMIES PO), Take by mouth, Disp: , Rfl:     DULoxetine (CYMBALTA) 30 mg delayed release capsule, Take 30 mg by mouth 2 (two) times a day, Disp: , Rfl:     [START ON 9/28/2022] estradiol (ESTRACE VAGINAL) 0 1 mg/g vaginal cream, Insert 1 g into the vagina 3 (three) times a week, Disp: 42 5 g, Rfl: 3    famotidine (PEPCID) 10 mg tablet, Take 2 tablets (20 mg total) by mouth daily at bedtime, Disp: 180 tablet, Rfl: 2    gabapentin (Neurontin) 300 mg capsule, Take 1 capsule (300 mg total) by mouth daily at bedtime, Disp: 30 capsule, Rfl: 3    insulin aspart (NovoLOG) 100 units/mL injection, Inject under the skin 3 (three) times a day before meals 16 units, 16 units, 25 units  , Disp: , Rfl:     insulin detemir (Levemir FlexTouch) 100 Units/mL injection pen, Inject 50 Units under the skin every evening , Disp: , Rfl:     Krill Oil (Omega-3) 500 MG CAPS, Take 1,600 mg by mouth daily, Disp: , Rfl:     levothyroxine 112 mcg tablet, Take 112 mcg by mouth every morning, Disp: , Rfl:     metoprolol tartrate (LOPRESSOR) 50 mg tablet, TAKE 1 TABLET (50 MG TOTAL) BY MOUTH EVERY 12 (TWELVE) HOURS, Disp: 180 tablet, Rfl: 3    olmesartan (BENICAR) 20 mg tablet, Take 0 5 tablets (10 mg total) by mouth daily (Patient taking differently: Take 10 mg by mouth 2 (two) times a day), Disp: 45 tablet, Rfl: 3    pantoprazole (PROTONIX) 40 mg tablet, Take 1 tablet (40 mg total) by mouth 2 (two) times a day, Disp: 90 tablet, Rfl: 2    pramipexole (MIRAPEX) 0 25 mg tablet, Take 1 tablet (0 25 mg total) by mouth daily at bedtime, Disp: 90 tablet, Rfl: 0    Probiotic Product (PROBIOTIC PO), Take 1 capsule by mouth 2 (two) times a day, Disp: , Rfl:     torsemide (DEMADEX) 20 mg tablet, TAKE 1 TABLET BY MOUTH EVERY DAY, Disp: 90 tablet, Rfl: 3    Vitamin D, Ergocalciferol, 2000 units CAPS, Take 2,000 Units by mouth daily , Disp: , Rfl:     BIOTIN PO, Take by mouth (Patient not taking: No sig reported), Disp: , Rfl:     calcium carbonate (TUMS) 500 mg chewable tablet, Chew 1 tablet 2 (two) times a day (Patient not taking: No sig reported), Disp: , Rfl:     rosuvastatin (CRESTOR) 20 MG tablet, Take 1 tablet (20 mg total) by mouth daily, Disp: 30 tablet, Rfl: 0    sucralfate (CARAFATE) 1 g tablet, Take 1 tablet (1 g total) by mouth 4 (four) times a day for 10 days Crush tablet and dissolve in 8oz water to make a sluree (Patient not taking: Reported on 9/23/2022), Disp: 40 tablet, Rfl: 0      Active Problems     Patient Active Problem List   Diagnosis    Stage 4 chronic kidney disease ([facility] [address] )    Hypertensive chronic kidney disease with stage 1 through stage 4 chronic kidney disease, or unspecified chronic kidney disease    BARBARA (obstructive sleep apnea)    RLS (restless legs syndrome)    Persistent proteinuria    Type 2 diabetes mellitus with diabetic nephropathy, with long-term current use of insulin (HCC)    Pulmonary hypertension (HCC)    Dyspnea    Acute respiratory failure with hypoxia (HCC)    History of repair of hiatal hernia    ANDRA (acute kidney injury) ([facility] [address] )    Dyslipidemia    Vitamin D deficiency    Anemia of chronic renal failure, stage 3 (moderate) [facility])    Fibromyalgia    Type 2 diabetes mellitus with diabetic chronic kidney disease (HCC)    GERD (gastroesophageal reflux disease)    History of kidney stones    Urinary urgency    Essential hypertension    Ambulatory dysfunction    Memory changes    Gastroparesis diabeticorum (HCC)    B12 neuropathy (HCC)    Depression, recurrent (HCC)    Chronic respiratory failure with hypoxia (HCC)    Anemia in stage 4 chronic kidney disease (HCC)    Epigastric abdominal pain with severe diabetic gastroparesis, status post EGD/Botox injection, 5/14    COPD (chronic obstructive pulmonary disease) (HCC)    BMI 29 0-29 9,adult    Chronic constipation    History of colon polyps    COVID-19 virus infection    Other chest pain    Diabetic nephropathy associated with type 2 diabetes mellitus (HCC)    Abdominal pain    Abnormal thyroid blood test    Hypotension    Urinary retention    UTI (urinary tract infection)    S/P foot surgery    Urinary incontinence         Past Medical History     Past Medical History:   Diagnosis Date    Allergic     Allergic rhinitis     Anesthesia     pt reports "had to use double lumen endo tube for hiatal hernia repair/so surgeon could get to where he needed to work"    Arthritis     Aspiration into airway     Basal cell carcinoma 2007    left cheek     BCC (basal cell carcinoma) 05/27/2021    Left Nasal tip    Cancer ([address] )     squamous cell cancer on forhead    Cancer ([address] )     basal cell on nose     Chronic kidney disease 2000, 2018    Stones, kidney disease stage 4    Chronic pain disorder     bilat feet and joint pain on occas    Colon polyp     Constipation     COPD (chronic obstructive pulmonary disease) ([address] )     COVID-19 08/2021    recovered at home/did receive monoclonal infusion    Dental bridge present     Depression     Diabetes mellitus ([address] )     Type 2    Diabetic neuropathy ([address] )     Disease of thyroid gland     Family history of thyroid problem     Fatty liver     GERD (gastroesophageal reflux disease)     Heart burn     Hiatal hernia  History of pneumonia     Hyperlipidemia     Hyperplasia, parathyroid ([address] )     Hypertension     Kidney problem     Kidney stone     Memory loss Julu2 2020    Motion sickness     Motion sickness     Neck pain     Obesity 1978    Obesity (BMI 30 0-34  9)     Pollen allergies     RLS (restless legs syndrome)     SCC (squamous cell carcinoma) 05/04/2021    left mid forehead    Seasonal allergies     Sleep apnea     has inspire    Squamous cell skin cancer 2007    left cheek     Swollen ankles     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, left     great toe fusion    Urinary incontinence     Urinary tract infection 03/28/2022    Wears glasses          Surgical History     Past Surgical History:   Procedure Laterality Date    ARTHROSCOPY KNEE      BREAST BIOPSY      stereotactic-benign    BREAST BIOPSY      stereo-benign    BREAST EXCISIONAL BIOPSY      unknown date-benign    BREAST EXCISIONAL BIOPSY      unknown date-benign    BREAST EXCISIONAL BIOPSY      unknown date-benign    BREAST EXCISIONAL BIOPSY      unknown age-benign    CARDIAC CATHETERIZATION      CHOLECYSTECTOMY      COLONOSCOPY      EXAMINATION UNDER ANESTHESIA N/A 06/24/2021    Procedure: EXAM UNDER ANESTHESIA (EUA), DISE;  Surgeon: Yolis Diallo MD;  [facility];  Service: ENT    HERNIA REPAIR      HIATAL HERNIA REPAIR      KNEE SURGERY      Torn maniscus lap surg    LIPOSUCTION      LYMPH NODE BIOPSY      MOHS SURGERY  05/20/2021    left mid forehead-Evan    MOHS SURGERY Left 05/27/2021    Left nasal tip- evan     IN FOOT/TOES SURGERY PROC UNLISTED Right 07/19/2022    Procedure: 1st metatarsal phalangeal joint fusion;  Surgeon: Marisa Gutiérrez DPM;  Location[facility];  Service: Podiatry    IN INCISION IMPLANT CRANIAL NERVE STIM ELECTRODE/PULSE GEN N/A 11/10/2021    Procedure: INSERTION UPPER AIRWAY STIMULATOR, INSPIRE IMPLANT;  Surgeon: Yolis Diallo MD;  [facility];  Service: ENT  REDUCTION MAMMAPLASTY Bilateral 2000    REDUCTION MAMMAPLASTY      SKIN BIOPSY      SKIN CANCER EXCISION  2007    squamous cell carcinoma     SKIN CANCER EXCISION  2007    basal cell carcinoma    SQUAMOUS CELL CARCINOMA EXCISION      TOE SURGERY Right 08/04/2022    Right Great Toe Fusion    TONSILLECTOMY      TUBAL LIGATION      UPPER GASTROINTESTINAL ENDOSCOPY      US GUIDED BREAST BIOPSY RIGHT COMPLETE Right 07/18/2022         Family History     Family History   Problem Relation Age of Onset    Heart disease Mother     Depression Mother     Hypertension Mother    Sanam Hernandez COPD Mother     Hearing loss Mother     Anxiety disorder Mother     Heart disease Father     Lung cancer Father 79        Smoker     Cancer Father         brain    Alcohol abuse Father    Sanam Hernandez Dementia Father     Hypertension Father     Thyroid disease Father    Sanam Hernandez COPD Father     Arthritis Father     Brain cancer Father 76    Hypertension Sister     Diabetes Sister     Heart disease Sister     Thyroid disease Sister    Sanam Hernandez Cancer Sister         Lympoma, lung    Lung cancer Sister 78    Anxiety disorder Sister     Hypertension Brother     Diabetes Brother     Cancer Brother         Throat    Dementia Brother     Stroke Brother     Hypertension Brother     Heart disease Brother     Diabetes Brother     No Known Problems Son     No Known Problems Son     Brain cancer Paternal [de-identified]         unknown age   Sanam Hernandez Breast cancer Neg Hx          Social History     Social History       Radiology

## 2022-09-28 RX ORDER — ESTRADIOL 0.1 MG/G
1 CREAM VAGINAL 3 TIMES WEEKLY
Qty: 42.5 G | Refills: 3 | Status: SHIPPED | OUTPATIENT
Start: 2022-09-28

## 2022-10-03 NOTE — PATIENT INSTRUCTIONS
Your kidney function remains stable  Most recent creatinine 1 72, at baseline  We will continue routine surveillance as outlined below  Avoid all NSAIDs to include ibuprofen, Motrin, Aleve, Advil, Naproxen, Celebrex, Indomethacin, Toradol  Stay well hydrated  Continue all prescribed medications  Call if blood pressure consistently more than 140/90 or less than 110/50s  High and low blood pressures may affect your kidney function  Recommend low salt diet (2 gm sodium diet)  Please let us know if you are scheduled for any studies with IV contrast (ex: CT scan, arteriogram or cardiac catheterization)   Follow up in 4 months with Dr Golden Bangura with repeat labs prior to appointment  Kidney Smart education program recommended for general education regarding your known chronic kidney disease  Please contact the office with new symptoms or concerns

## 2022-10-03 NOTE — PROGRESS NOTES
Assessment and Plan:  Chronic kidney disease IV:    -Etiology:  Suspect diabetic nephropathy, hypertensive nephrosclerosis, arteriolar nephrosclerosis and chronic NSAID use  -Baseline creatinine 1 5-2 0  -serologies:  Normal C3/C4, negative rheumatoid factor, SPEP/UPEP, EWELINA, hepatitis C, IgA, ASO, RPR  -Follows with Dr Sari Lobo  -recent creatinine 1 72, stable at baseline  -UPCr 1 18, stable but remains above goal  -electrolytes and acid-base stable  Hemoglobin stable at 12  -avoid nephrotoxins, NSAIDs, hypotension and IV contrast if possible  -stay well hydrated  -Kidney Smart/CKD education discussed  Referral placed  -will increase olmesartan given persistent proteinuria >1 0 g  -recheck BMP in 2 weeks after dose adjustment  Patient instructed to send 1 week's blood pressure readings in 3 weeks to the office  -follow-up with Dr Sari Lobo in 4 months with repeat blood and urine studies  Hypertension/Volume status:  -BP well controlled and at/near goal   Hx of labile BP   -volume status euvolemic  -secondary workup negative including saline suppression test, plasma metanephrines and renal artery duplex  -continue current medications:  Amlodipine 5 mg daily, metoprolol 50 mg b i d , torsemide 20 mg daily, olmesartan 10 mg bid  -Avoid hypotension or fluctuations in blood pressure    -avoid NSAIDs  -low sodium (2 gm) diet  Encourage regular exercise  -BP log reviewed  Average and sitting 130/79, standing 128/80  -continue to monitor BP at home    Proteinuria:  -UA with 1+ protein 8/11/22 in the setting of UTI  -UPCR 1 18, essentially stable since May   -continue RAAS blockade with ARB  will increase olmesartan to 20 mg Q am and 10 mg q pm   -avoid NSAIDs  -continue to monitor  Repeat urine studies prior to next appointment    Anemia of CKD:  -Hgb 12 2, at goal   Goal >10  -continue to monitor    Bone Mineral Disease of CKD:  -phosphorus 4 2, acceptable  Continue to monitor    Recommend low phosphorus diet  - 7, acceptable  -Vitamin D 25 40 8 in April 2022, at goal  -renal diet  -continue to monitor    DM II:  -uncontrolled  -HgbA1C 7 4 in April  -continue to optimize glycemic control to slow progression of chronic kidney disease  -management per primary team    Dyslipidemia:  -stable  -continue statin  -low-cholesterol and low-fat diet, aerobic exercise  -management per PCP    Age related screening: Your primary caregiver may do yearly screening for colorectal cancer  It is recommended in all men and women over 48years old  You may have screening earlier if you have colon disease or a family history of colorectal cancer    Alona Mayfield was seen today for follow-up  Diagnoses and all orders for this visit:    Stage 4 chronic kidney disease ([facility] [address] )  -     Comprehensive metabolic panel; Future  -     CBC; Future  -     Magnesium; Future  -     Phosphorus; Future  -     Protein / creatinine ratio, urine; Future  -     PTH, intact; Future  -     Ambulatory referral to ckd education program; Future  -     Basic metabolic panel; Future    Hypertensive chronic kidney disease with stage 1 through stage 4 chronic kidney disease, or unspecified chronic kidney disease  -     olmesartan (BENICAR) 20 mg tablet; Take 1 tablet (20 mg total) by mouth daily Take 1 tablet q am and 1/2 tablet qpm daily    Anemia in stage 4 chronic kidney disease (HCC)  -     CBC; Future    Essential hypertension    Type 2 diabetes mellitus with diabetic nephropathy, with long-term current use of insulin ([facility])    Vitamin D deficiency    Persistent proteinuria  -     Protein / creatinine ratio, urine; Future  -     Basic metabolic panel; Future  -     olmesartan (BENICAR) 20 mg tablet;  Take 1 tablet (20 mg total) by mouth daily Take 1 tablet q am and 1/2 tablet qpm daily    Dyslipidemia    Chronic kidney disease (CKD) stage G3a/A1, moderately decreased glomerular filtration rate (GFR) between 45-59 mL/min/1 73 square meter and albuminuria creatinine ratio less than 30 mg/g (HCC)  -     olmesartan (BENICAR) 20 mg tablet; Take 1 tablet (20 mg total) by mouth daily Take 1 tablet q am and 1/2 tablet qpm daily        Follow up with Dr Landry Pires in 4 months with repeat blood and urine studies  Please call the office with any questions or concerns  Reason for Visit: Follow-up (CKD,HTN)    HPI: Angelo Moreira is a 67 y o  female with CKD 4 with baseline creatinine 1 5-2 0, DM 2, HTN, HLD, COPD, BARBARA, GERD, gastroparesis s/p Botox injections, fibromyalgia, who presents for follow up of CKD  Patient followed by Dr Tyrell Juan, last seen 5/19/22  Most recent creatinine 1 72 and remains at baseline  Urine protein creatinine ratio remains elevated at 1 18 but essentially unchanged  Patient on olmesartan  Patient seen in ER 9/10/22 due to abdominal pain and treated for gastritis and discharged  Patient also hospitalized in July due to hypoxia in the setting of COVID-19 infection  Patient treated with remdesivir, anticoagulant and Decadron for 3 days and discharged without O2  Patient has recovered well  Patient continues to recover from right foot surgery 11 weeks ago and walking with boot  Patient denies NSAID use  Patient denies nausea, vomiting, diarrhea, dyspnea, orthopnea, edema, hematuria or foamy urine       ROS: A complete review of systems was performed and was negative unless otherwise noted in the history of present illness  Allergies:   Sulfamethoxazole-trimethoprim and Ciprofloxacin    Medications:     Current Outpatient Medications:     acetaminophen (TYLENOL) 325 mg tablet, Take 2 tablets (650 mg total) by mouth every 6 (six) hours as needed for mild pain, headaches or fever, Disp: , Rfl: 0    albuterol (Ventolin HFA) 90 mcg/act inhaler, Inhale 2 puffs every 6 (six) hours as needed for wheezing, Disp: 54 g, Rfl: 0    amLODIPine (NORVASC) 5 mg tablet, TAKE 1 TABLET (5 MG TOTAL) BY MOUTH DAILY  , Disp: 90 tablet, Rfl: 0    azelastine (ASTELIN) 0 1 % nasal spray, 1 spray into each nostril 2 (two) times a day Use in each nostril as directed, Disp: 30 mL, Rfl: 3    b complex vitamins capsule, Take 1 capsule by mouth daily, Disp: , Rfl:     B-D ULTRAFINE III SHORT PEN 31G X 8 MM MISC, USE AS DIRECTED 4 TIMES PER DAY, Disp: , Rfl: 3    Biotin w/ Vitamins C & E (HAIR SKIN & NAILS GUMMIES PO), Take by mouth, Disp: , Rfl:     DULoxetine (CYMBALTA) 30 mg delayed release capsule, Take 30 mg by mouth 2 (two) times a day, Disp: , Rfl:     estradiol (ESTRACE VAGINAL) 0 1 mg/g vaginal cream, Insert 1 g into the vagina 3 (three) times a week, Disp: 42 5 g, Rfl: 3    famotidine (PEPCID) 10 mg tablet, Take 2 tablets (20 mg total) by mouth daily at bedtime, Disp: 180 tablet, Rfl: 2    gabapentin (Neurontin) 300 mg capsule, Take 1 capsule (300 mg total) by mouth daily at bedtime, Disp: 30 capsule, Rfl: 3    insulin aspart (NovoLOG) 100 units/mL injection, Inject under the skin 3 (three) times a day before meals 16 units, 16 units, 25 units  , Disp: , Rfl:     insulin detemir (Levemir FlexTouch) 100 Units/mL injection pen, Inject 50 Units under the skin every evening , Disp: , Rfl:     Krill Oil (Omega-3) 500 MG CAPS, Take 1,600 mg by mouth daily, Disp: , Rfl:     levothyroxine 112 mcg tablet, Take 112 mcg by mouth every morning, Disp: , Rfl:     metoprolol tartrate (LOPRESSOR) 50 mg tablet, TAKE 1 TABLET (50 MG TOTAL) BY MOUTH EVERY 12 (TWELVE) HOURS, Disp: 180 tablet, Rfl: 3    olmesartan (BENICAR) 20 mg tablet, Take 1 tablet (20 mg total) by mouth daily Take 1 tablet q am and 1/2 tablet qpm daily, Disp: 30 tablet, Rfl: 3    pantoprazole (PROTONIX) 40 mg tablet, Take 1 tablet (40 mg total) by mouth 2 (two) times a day, Disp: 90 tablet, Rfl: 2    pramipexole (MIRAPEX) 0 25 mg tablet, Take 1 tablet (0 25 mg total) by mouth daily at bedtime, Disp: 90 tablet, Rfl: 0    Probiotic Product (PROBIOTIC PO), Take 1 capsule by mouth 2 (two) times a day, Disp: , Rfl:     rosuvastatin (CRESTOR) 20 MG tablet, Take 1 tablet (20 mg total) by mouth daily, Disp: 30 tablet, Rfl: 0    torsemide (DEMADEX) 20 mg tablet, TAKE 1 TABLET BY MOUTH EVERY DAY, Disp: 90 tablet, Rfl: 3    Vitamin D, Ergocalciferol, 2000 units CAPS, Take 2,000 Units by mouth daily , Disp: , Rfl:     BIOTIN PO, Take by mouth (Patient not taking: No sig reported), Disp: , Rfl:     calcium carbonate (TUMS) 500 mg chewable tablet, Chew 1 tablet 2 (two) times a day (Patient not taking: No sig reported), Disp: , Rfl:     sucralfate (CARAFATE) 1 g tablet, Take 1 tablet (1 g total) by mouth 4 (four) times a day for 10 days Crush tablet and dissolve in 8oz water to make a sluree (Patient not taking: No sig reported), Disp: 40 tablet, Rfl: 0    Past Medical History:   Diagnosis Date    Allergic     Allergic rhinitis     Anesthesia     pt reports "had to use double lumen endo tube for hiatal hernia repair/so surgeon could get to where he needed to work"    Arthritis     Aspiration into airway     Basal cell carcinoma 2007    left cheek     BCC (basal cell carcinoma) 05/27/2021    Left Nasal tip    Cancer (HCC)     squamous cell cancer on forhead    Cancer ([facility] [address] )     basal cell on nose     Chronic kidney disease 2000, 2018    Stones, kidney disease stage 4    Chronic pain disorder     bilat feet and joint pain on occas    Colon polyp     Constipation     COPD (chronic obstructive pulmonary disease) ([facility] [address] )     COVID-19 08/2021    recovered at home/did receive monoclonal infusion    Dental bridge present     Depression     Diabetes mellitus ([facility] [address] )     Type 2    Diabetic neuropathy (HCC)     Disease of thyroid gland     Family history of thyroid problem     Fatty liver     GERD (gastroesophageal reflux disease)     Heart burn     Hiatal hernia     History of pneumonia     Hyperlipidemia     Hyperplasia, parathyroid ([address] )     Hypertension     Kidney problem     Kidney stone     Memory loss Julu2 2020    Motion sickness     Motion sickness     Neck pain     Obesity 1978    Obesity (BMI 30 0-34  9)     Pollen allergies     RLS (restless legs syndrome)     SCC (squamous cell carcinoma) 05/04/2021    left mid forehead    Seasonal allergies     Sleep apnea     has inspire    Squamous cell skin cancer 2007    left cheek     Swollen ankles     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, left     great toe fusion    Urinary incontinence     Urinary tract infection 03/28/2022    Wears glasses      Past Surgical History:   Procedure Laterality Date    ARTHROSCOPY KNEE      BREAST BIOPSY      stereotactic-benign    BREAST BIOPSY      stereo-benign    BREAST EXCISIONAL BIOPSY      unknown date-benign    BREAST EXCISIONAL BIOPSY      unknown date-benign    BREAST EXCISIONAL BIOPSY      unknown date-benign    BREAST EXCISIONAL BIOPSY      unknown age-benign    CARDIAC CATHETERIZATION      CHOLECYSTECTOMY      COLONOSCOPY      EXAMINATION UNDER ANESTHESIA N/A 06/24/2021    Procedure: EXAM UNDER ANESTHESIA (EUA), DISE;  Surgeon: Efrain Figueroa MD;  [facility];  Service: ENT    HERNIA REPAIR      HIATAL HERNIA REPAIR      KNEE SURGERY      Torn maniscus lap surg    LIPOSUCTION      LYMPH NODE BIOPSY      MOHS SURGERY  05/20/2021    left mid forehead-Gautam    MOHS SURGERY Left 05/27/2021    Left nasal tip- gautam     CA FOOT/TOES SURGERY PROC UNLISTED Right 07/19/2022    Procedure: 1st metatarsal phalangeal joint fusion;  Surgeon: Lisa Gallardo DPM;  Location: [facility];  Service: Podiatry    CA INCISION IMPLANT CRANIAL NERVE STIM ELECTRODE/PULSE GEN N/A 11/10/2021    Procedure: INSERTION UPPER AIRWAY STIMULATOR, INSPIRE IMPLANT;  Surgeon: Efrain Figueroa MD;  [facility];  Service: ENT    REDUCTION MAMMAPLASTY Bilateral 2000    REDUCTION MAMMAPLASTY      SKIN BIOPSY      SKIN CANCER EXCISION  2007    squamous cell carcinoma     SKIN CANCER EXCISION  2007 basal cell carcinoma    SQUAMOUS CELL CARCINOMA EXCISION      TOE SURGERY Right 08/04/2022    Right Great Toe Fusion    TONSILLECTOMY      TUBAL LIGATION      UPPER GASTROINTESTINAL ENDOSCOPY      US GUIDED BREAST BIOPSY RIGHT COMPLETE Right 07/18/2022     Family History   Problem Relation Age of Onset    Heart disease Mother     Depression Mother     Hypertension Mother    Olinda Courser COPD Mother     Hearing loss Mother     Anxiety disorder Mother     Heart disease Father     Lung cancer Father 79        Smoker     Cancer Father         brain    Alcohol abuse Father    Olinda Courser Dementia Father     Hypertension Father     Thyroid disease Father    Olinda Courser COPD Father     Arthritis Father     Brain cancer Father 76    Hypertension Sister     Diabetes Sister     Heart disease Sister     Thyroid disease Sister     Cancer Sister         Lympoma, lung    Lung cancer Sister 78    Anxiety disorder Sister     Hypertension Brother     Diabetes Brother     Cancer Brother         Throat    Dementia Brother     Stroke Brother     Hypertension Brother     Heart disease Brother     Diabetes Brother     No Known Problems Son     No Known Problems Son     Brain cancer Paternal Aunt         unknown age   Olinda Courser Breast cancer Neg Hx       reports that she quit smoking about 46 years ago  Her smoking use included cigarettes  She started smoking about 54 years ago  She has a 20 00 pack-year smoking history  She has never used smokeless tobacco  She reports current alcohol use  She reports that she does not use drugs  Physical Exam:   Vitals:    10/04/22 0949 10/04/22 1009   BP: 112/76 130/70   BP Location: Right arm Right arm   Patient Position: Sitting Sitting   Cuff Size: Adult Standard   Weight: 73 9 kg (163 lb)    Height: 5' 3" (1 6 m)      Body mass index is 28 87 kg/m²  General:  Awake, alert, appears comfortable and in no acute distress  Nontoxic    Skin:  No rash, warm, good skin turgor   Eyes:  PERRL, EOMI, sclerae nonicteric   no periorbital edema   ENT:  Moist mucous membranes  Neck:  Trachea midline, symmetric  No JVD  No carotid bruits  Chest:  Clear to auscultation bilaterally without wheezes, crackles or rhonchi  CVS:  Regular rate and rhythm without murmur, gallop or rub  S1 and S2 identified and normal   No S3, S4    Abdomen:  Soft, nontender, nondistended without masses  Normal bowel sounds x 4 quadrants  No bruit  Extremities:  Warm, pink, motor and sensory intact and well perfused  No cyanosis, pallor  No BLE edema  Right boot in place  Neuro:  Awake, alert, oriented x3  Grossly intact  Psych:  Appropriate affect  Mentating appropriately  Normal mental status exam      Procedure:  No results found for this or any previous visit  Lab Results   Component Value Date    GLUCOSE 168 (H) 11/30/2018    CALCIUM 9 0 09/19/2022    K 4 1 09/19/2022    CO2 27 09/19/2022     09/19/2022    BUN 33 (H) 09/19/2022    CREATININE 1 72 (H) 09/19/2022             Invalid input(s): ALBUMIN    EMR, including Epic, [employer] Everywhere and outside scanned documents reviewed  I have personally reviewed the blood work as stated above and in my note  I have personally reviewed PCP, consultants and prior nephrology notes

## 2022-10-04 ENCOUNTER — OFFICE VISIT (OUTPATIENT)
Dept: NEPHROLOGY | Facility: CLINIC | Age: 72
End: 2022-10-04
Payer: MEDICARE

## 2022-10-04 VITALS
HEIGHT: 63 IN | SYSTOLIC BLOOD PRESSURE: 130 MMHG | WEIGHT: 163 LBS | DIASTOLIC BLOOD PRESSURE: 70 MMHG | BODY MASS INDEX: 28.88 KG/M2

## 2022-10-04 DIAGNOSIS — E11.21 TYPE 2 DIABETES MELLITUS WITH DIABETIC NEPHROPATHY, WITH LONG-TERM CURRENT USE OF INSULIN (HCC): ICD-10-CM

## 2022-10-04 DIAGNOSIS — R80.1 PERSISTENT PROTEINURIA: ICD-10-CM

## 2022-10-04 DIAGNOSIS — I12.9 HYPERTENSIVE CHRONIC KIDNEY DISEASE WITH STAGE 1 THROUGH STAGE 4 CHRONIC KIDNEY DISEASE, OR UNSPECIFIED CHRONIC KIDNEY DISEASE: ICD-10-CM

## 2022-10-04 DIAGNOSIS — N18.31 CHRONIC KIDNEY DISEASE (CKD) STAGE G3A/A1, MODERATELY DECREASED GLOMERULAR FILTRATION RATE (GFR) BETWEEN 45-59 ML/MIN/1.73 SQUARE METER AND ALBUMINURIA CREATININE RATIO LESS THAN 30 MG/G (HCC): ICD-10-CM

## 2022-10-04 DIAGNOSIS — E78.5 DYSLIPIDEMIA: ICD-10-CM

## 2022-10-04 DIAGNOSIS — N18.4 STAGE 4 CHRONIC KIDNEY DISEASE (HCC): Primary | ICD-10-CM

## 2022-10-04 DIAGNOSIS — N18.4 ANEMIA IN STAGE 4 CHRONIC KIDNEY DISEASE (HCC): ICD-10-CM

## 2022-10-04 DIAGNOSIS — E55.9 VITAMIN D DEFICIENCY: ICD-10-CM

## 2022-10-04 DIAGNOSIS — D63.1 ANEMIA IN STAGE 4 CHRONIC KIDNEY DISEASE (HCC): ICD-10-CM

## 2022-10-04 DIAGNOSIS — I10 ESSENTIAL HYPERTENSION: ICD-10-CM

## 2022-10-04 DIAGNOSIS — Z79.4 TYPE 2 DIABETES MELLITUS WITH DIABETIC NEPHROPATHY, WITH LONG-TERM CURRENT USE OF INSULIN (HCC): ICD-10-CM

## 2022-10-04 PROCEDURE — 99214 OFFICE O/P EST MOD 30 MIN: CPT | Performed by: PHYSICIAN ASSISTANT

## 2022-10-04 RX ORDER — OLMESARTAN MEDOXOMIL 20 MG/1
20 TABLET ORAL DAILY
Qty: 30 TABLET | Refills: 3 | Status: SHIPPED | OUTPATIENT
Start: 2022-10-04 | End: 2022-10-25 | Stop reason: SDUPTHER

## 2022-10-05 ENCOUNTER — TELEPHONE (OUTPATIENT)
Dept: OTHER | Facility: OTHER | Age: 72
End: 2022-10-05

## 2022-10-11 PROBLEM — N39.0 UTI (URINARY TRACT INFECTION): Status: RESOLVED | Noted: 2022-04-14 | Resolved: 2022-10-11

## 2022-10-20 ENCOUNTER — OFFICE VISIT (OUTPATIENT)
Dept: CARDIOLOGY CLINIC | Facility: CLINIC | Age: 72
End: 2022-10-20
Payer: MEDICARE

## 2022-10-20 VITALS
SYSTOLIC BLOOD PRESSURE: 140 MMHG | HEIGHT: 63 IN | BODY MASS INDEX: 29.8 KG/M2 | WEIGHT: 168.2 LBS | DIASTOLIC BLOOD PRESSURE: 76 MMHG | HEART RATE: 65 BPM | OXYGEN SATURATION: 94 %

## 2022-10-20 DIAGNOSIS — E78.5 DYSLIPIDEMIA: ICD-10-CM

## 2022-10-20 DIAGNOSIS — I27.20 PULMONARY HYPERTENSION (HCC): Primary | ICD-10-CM

## 2022-10-20 DIAGNOSIS — I10 ESSENTIAL HYPERTENSION: ICD-10-CM

## 2022-10-20 PROCEDURE — 99214 OFFICE O/P EST MOD 30 MIN: CPT | Performed by: INTERNAL MEDICINE

## 2022-10-20 NOTE — PROGRESS NOTES
Cardiology Consultation     Ashley Kessler  [id number]  1950  [address] [facility] [place] [phone]    1  Pulmonary hypertension ([facility] )     2  Essential hypertension     3  Dyslipidemia       Chief Complaint   Patient presents with   • Follow-up   • Ankle Swelling     BL     HPI:  Patient was recently in the hospital for COVID infection along with COPD exacerbation at that time  Now recovered and doing well  No reported chest pain, shortness of breath, palpitations, lightheadedness, syncope, LE edema, orthopnea, PND, or significant weight changes  Patient remains active without any increased fatigue out of the ordinary        Patient Active Problem List   Diagnosis   • Stage 4 chronic kidney disease ([place] [address] )   • Hypertensive chronic kidney disease with stage 1 through stage 4 chronic kidney disease, or unspecified chronic kidney disease   • BARBARA (obstructive sleep apnea)   • RLS (restless legs syndrome)   • Persistent proteinuria   • Type 2 diabetes mellitus with diabetic nephropathy, with long-term current use of insulin ([facility])   • Pulmonary hypertension ([facility])   • Dyspnea   • Acute respiratory failure with hypoxia ([facility])   • History of repair of hiatal hernia   • ANDRA (acute kidney injury) ([place] [address] )   • Dyslipidemia   • Vitamin D deficiency   • Anemia of chronic renal failure, stage 3 (moderate) ([facility])   • Fibromyalgia   • Type 2 diabetes mellitus with diabetic chronic kidney disease ([facility])   • GERD (gastroesophageal reflux disease)   • History of kidney stones   • Urinary urgency   • Essential hypertension   • Ambulatory dysfunction   • Memory changes   • Gastroparesis diabeticorum ([facility])   • B12 neuropathy ([facility])   • Depression, recurrent ([facility])   • Chronic respiratory failure with hypoxia ([facility])   • Anemia in stage 4 chronic kidney disease ([place] [address] )   • Epigastric abdominal pain with severe diabetic gastroparesis, status post EGD/Botox injection, 5/14   • COPD (chronic obstructive pulmonary disease) (HCC)   • BMI 29 0-29 9,adult   • Chronic constipation   • History of colon polyps   • COVID-19 virus infection   • Other chest pain   • Diabetic nephropathy associated with type 2 diabetes mellitus (HCC)   • Abdominal pain   • Abnormal thyroid blood test   • Hypotension   • Urinary retention   • S/P foot surgery   • Urinary incontinence     Past Medical History:   Diagnosis Date   • Allergic    • Allergic rhinitis    • Anesthesia     pt reports "had to use double lumen endo tube for hiatal hernia repair/so surgeon could get to where he needed to work"   • Arthritis    • Aspiration into airway    • Basal cell carcinoma 2007    left cheek    • BCC (basal cell carcinoma) 05/27/2021    Left Nasal tip   • Cancer (HCC)     squamous cell cancer on forhead   • Cancer ([address] )     basal cell on nose    • Chronic kidney disease 2000, 2018    Stones, kidney disease stage 4   • Chronic pain disorder     bilat feet and joint pain on occas   • Colon polyp    • Constipation    • COPD (chronic obstructive pulmonary disease) ([address] )    • COVID-19 08/2021    recovered at home/did receive monoclonal infusion   • Dental bridge present    • Depression    • Diabetes mellitus ([address] )     Type 2   • Diabetic neuropathy ([facility] )    • Disease of thyroid gland    • Family history of thyroid problem    • Fatty liver    • GERD (gastroesophageal reflux disease)    • Heart burn    • Hiatal hernia    • History of pneumonia    • Hyperlipidemia    • Hyperplasia, parathyroid ([address] )    • Hypertension    • Kidney problem    • Kidney stone    • Memory loss Julu2 2020   • Motion sickness    • Motion sickness    • Neck pain    • Obesity 1978   • Obesity (BMI 30 0-34  9)    • Pollen allergies    • RLS (restless legs syndrome)    • SCC (squamous cell carcinoma) 05/04/2021    left mid forehead   • Seasonal allergies    • Sleep apnea     has inspire   • Squamous cell skin cancer 2007 left cheek    • Swollen ankles    • 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, left     great toe fusion   • Urinary incontinence    • Urinary tract infection 2022   • Wears glasses      Social History     Socioeconomic History   • Marital status: /Civil Union     Spouse name: Not on file   • Number of children: Not on file   • Years of education: Not on file   • Highest education level: Not on file   Occupational History   • Occupation: RETIRED   Tobacco Use   • Smoking status: Former Smoker     Packs/day: 2 00     Years: 10 00     Pack years: 20 00     Types: Cigarettes     Start date: 1968     Quit date: 1976     Years since quittin 8   • Smokeless tobacco: Never Used   • Tobacco comment: Smoked 2 pack a day   Vaping Use   • Vaping Use: Never used   Substance and Sexual Activity   • Alcohol use: Yes     Comment: 1 or 2 a year   • Drug use: No   • Sexual activity: Not Currently     Partners: Male     Birth control/protection: Abstinence, Post-menopausal, Female Sterilization     Comment: defer   Other Topics Concern   • Not on file   Social History Narrative   • Not on file     Social Determinants of Health     Financial Resource Strain: Not on file   Food Insecurity: Not on file   Transportation Needs: Not on file   Physical Activity: Not on file   Stress: Not on file   Social Connections: Not on file   Intimate Partner Violence: Not on file   Housing Stability: Not on file      Family History   Problem Relation Age of Onset   • Heart disease Mother    • Depression Mother    • Hypertension Mother    • COPD Mother    • Hearing loss Mother    • Anxiety disorder Mother    • Heart disease Father    • Lung cancer Father 79        Smoker    • Cancer Father         brain   • Alcohol abuse Father    • Dementia Father    • Hypertension Father    • Thyroid disease Father    • COPD Father    • Arthritis Father    • Brain cancer Father 76   • Hypertension Sister    • Diabetes Sister    • Heart disease Sister    • Thyroid disease Sister    • Cancer Sister         Lympoma, lung   • Lung cancer Sister 78   • Anxiety disorder Sister    • Hypertension Brother    • Diabetes Brother    • Cancer Brother         Throat   • Dementia Brother    • Stroke Brother    • Hypertension Brother    • Heart disease Brother    • Diabetes Brother    • No Known Problems Son    • No Known Problems Son    • Brain cancer Paternal Aunt         unknown age   • Breast cancer Neg Hx      Past Surgical History:   Procedure Laterality Date   • ARTHROSCOPY KNEE     • BREAST BIOPSY      stereotactic-benign   • BREAST BIOPSY      stereo-benign   • BREAST EXCISIONAL BIOPSY      unknown date-benign   • BREAST EXCISIONAL BIOPSY      unknown date-benign   • BREAST EXCISIONAL BIOPSY      unknown date-benign   • BREAST EXCISIONAL BIOPSY      unknown age-benign   • CARDIAC CATHETERIZATION     • CHOLECYSTECTOMY     • COLONOSCOPY     • EXAMINATION UNDER ANESTHESIA N/A 06/24/2021    Procedure: EXAM UNDER ANESTHESIA (EUA), DISE;  Surgeon: Jone Pickens MD;  [facility];  Service: ENT   • HERNIA REPAIR     • HIATAL HERNIA REPAIR     • KNEE SURGERY      Torn maniscus lap surg   • LIPOSUCTION     • LYMPH NODE BIOPSY     • MOHS SURGERY  05/20/2021    left mid forehead-Evan   • MOHS SURGERY Left 05/27/2021    Left nasal tip- evan    • FL FOOT/TOES SURGERY PROC UNLISTED Right 07/19/2022    Procedure: 1st metatarsal phalangeal joint fusion;  Surgeon: Trae Leigh DPM;  Location: [facility];  Service: Podiatry   • FL INCISION IMPLANT CRANIAL NERVE STIM ELECTRODE/PULSE GEN N/A 11/10/2021    Procedure: INSERTION UPPER AIRWAY STIMULATOR, INSPIRE IMPLANT;  Surgeon: Jone Pickens MD;  Location: [facility];  Service: ENT   • REDUCTION MAMMAPLASTY Bilateral 2000   • REDUCTION MAMMAPLASTY     • SKIN BIOPSY     • SKIN CANCER EXCISION  2007    squamous cell carcinoma    • SKIN CANCER EXCISION  2007    basal cell carcinoma   • SQUAMOUS CELL CARCINOMA EXCISION     • TOE SURGERY Right 08/04/2022    Right Great Toe Fusion   • TONSILLECTOMY     • TUBAL LIGATION     • UPPER GASTROINTESTINAL ENDOSCOPY     • US GUIDED BREAST BIOPSY RIGHT COMPLETE Right 07/18/2022       Current Outpatient Medications:   •  acetaminophen (TYLENOL) 325 mg tablet, Take 2 tablets (650 mg total) by mouth every 6 (six) hours as needed for mild pain, headaches or fever, Disp: , Rfl: 0  •  albuterol (Ventolin HFA) 90 mcg/act inhaler, Inhale 2 puffs every 6 (six) hours as needed for wheezing, Disp: 54 g, Rfl: 0  •  amLODIPine (NORVASC) 5 mg tablet, TAKE 1 TABLET (5 MG TOTAL) BY MOUTH DAILY  , Disp: 90 tablet, Rfl: 0  •  azelastine (ASTELIN) 0 1 % nasal spray, 1 spray into each nostril 2 (two) times a day Use in each nostril as directed, Disp: 30 mL, Rfl: 3  •  b complex vitamins capsule, Take 1 capsule by mouth daily, Disp: , Rfl:   •  B-D ULTRAFINE III SHORT PEN 31G X 8 MM MISC, USE AS DIRECTED 4 TIMES PER DAY, Disp: , Rfl: 3  •  Biotin w/ Vitamins C & E (HAIR SKIN & NAILS GUMMIES PO), Take by mouth, Disp: , Rfl:   •  DULoxetine (CYMBALTA) 30 mg delayed release capsule, Take 30 mg by mouth 2 (two) times a day, Disp: , Rfl:   •  estradiol (ESTRACE VAGINAL) 0 1 mg/g vaginal cream, Insert 1 g into the vagina 3 (three) times a week, Disp: 42 5 g, Rfl: 3  •  famotidine (PEPCID) 10 mg tablet, Take 2 tablets (20 mg total) by mouth daily at bedtime, Disp: 180 tablet, Rfl: 2  •  gabapentin (Neurontin) 300 mg capsule, Take 1 capsule (300 mg total) by mouth daily at bedtime, Disp: 30 capsule, Rfl: 3  •  insulin aspart (NovoLOG) 100 units/mL injection, Inject under the skin 3 (three) times a day before meals 16 units, 16 units, 25 units  , Disp: , Rfl:   •  insulin detemir (Levemir FlexTouch) 100 Units/mL injection pen, Inject 50 Units under the skin every evening , Disp: , Rfl:   •  Krill Oil (Omega-3) 500 MG CAPS, Take 1,600 mg by mouth daily, Disp: , Rfl:   •  levothyroxine 112 mcg tablet, Take 112 mcg by mouth every morning, Disp: , Rfl:   •  metoprolol tartrate (LOPRESSOR) 50 mg tablet, TAKE 1 TABLET (50 MG TOTAL) BY MOUTH EVERY 12 (TWELVE) HOURS, Disp: 180 tablet, Rfl: 3  •  olmesartan (BENICAR) 20 mg tablet, Take 1 tablet (20 mg total) by mouth daily Take 1 tablet q am and 1/2 tablet qpm daily, Disp: 30 tablet, Rfl: 3  •  pantoprazole (PROTONIX) 40 mg tablet, Take 1 tablet (40 mg total) by mouth 2 (two) times a day, Disp: 90 tablet, Rfl: 2  •  pramipexole (MIRAPEX) 0 25 mg tablet, Take 1 tablet (0 25 mg total) by mouth daily at bedtime, Disp: 90 tablet, Rfl: 0  •  Probiotic Product (PROBIOTIC PO), Take 1 capsule by mouth 2 (two) times a day, Disp: , Rfl:   •  rosuvastatin (CRESTOR) 20 MG tablet, Take 1 tablet (20 mg total) by mouth daily, Disp: 30 tablet, Rfl: 0  •  torsemide (DEMADEX) 20 mg tablet, TAKE 1 TABLET BY MOUTH EVERY DAY, Disp: 90 tablet, Rfl: 3  •  Vitamin D, Ergocalciferol, 2000 units CAPS, Take 2,000 Units by mouth daily , Disp: , Rfl:   •  BIOTIN PO, Take by mouth (Patient not taking: No sig reported), Disp: , Rfl:   •  calcium carbonate (TUMS) 500 mg chewable tablet, Chew 1 tablet 2 (two) times a day (Patient not taking: No sig reported), Disp: , Rfl:   •  sucralfate (CARAFATE) 1 g tablet, Take 1 tablet (1 g total) by mouth 4 (four) times a day for 10 days Crush tablet and dissolve in 8oz water to make a sluree (Patient not taking: No sig reported), Disp: 40 tablet, Rfl: 0  Allergies   Allergen Reactions   • Sulfamethoxazole-Trimethoprim Other (See Comments)     Tongue swelling   • Ciprofloxacin Hives and Itching     Vitals:    10/20/22 1401   BP: 140/76   BP Location: Left arm   Patient Position: Sitting   Cuff Size: Large   Pulse: 65   SpO2: 94%   Weight: 76 3 kg (168 lb 3 2 oz)   Height: 5' 3" (1 6 m)       Labs:  Office Visit on 09/27/2022   Component Date Value   • POST-VOID RESIDUAL VOLUM* 09/27/2022 108    Appointment on 09/19/2022   Component Date Value   • Sodium 09/19/2022 139    • Potassium 09/19/2022 4 1    • Chloride 09/19/2022 106    • CO2 09/19/2022 27    • ANION GAP 09/19/2022 6    • BUN 09/19/2022 33 (A)   • Creatinine 09/19/2022 1 72 (A)   • Glucose, Fasting 09/19/2022 164 (A)   • Calcium 09/19/2022 9 0    • Corrected Calcium 09/19/2022 9 6    • AST 09/19/2022 30    • ALT 09/19/2022 57    • Alkaline Phosphatase 09/19/2022 104    • Total Protein 09/19/2022 6 7    • Albumin 09/19/2022 3 2 (A)   • Total Bilirubin 09/19/2022 0 38    • eGFR 09/19/2022 29    • WBC 09/19/2022 8 20    • RBC 09/19/2022 4 29    • Hemoglobin 09/19/2022 12 2    • Hematocrit 09/19/2022 37 3    • MCV 09/19/2022 87    • MCH 09/19/2022 28 4    • MCHC 09/19/2022 32 7    • RDW 09/19/2022 15 3 (A)   • Platelets 75/55/6028 250    • MPV 09/19/2022 11 8    • Total CK 09/19/2022 49    • Cholesterol 09/19/2022 103    • Triglycerides 09/19/2022 226 (A)   • HDL, Direct 09/19/2022 37 (A)   • LDL Calculated 09/19/2022 21    • Magnesium 09/19/2022 2 1    • Phosphorus 09/19/2022 4 2 (A)   • Creatinine, Ur 09/19/2022 84 6    • Protein Urine Random 09/19/2022 100    • Prot/Creat Ratio, Ur 09/19/2022 1 18 (A)   • PTH 09/19/2022 107 7 (A)   Admission on 09/10/2022, Discharged on 09/10/2022   Component Date Value   • WBC 09/10/2022 10 02    • RBC 09/10/2022 4 30    • Hemoglobin 09/10/2022 12 1    • Hematocrit 09/10/2022 37 2    • MCV 09/10/2022 87    • MCH 09/10/2022 28 1    • MCHC 09/10/2022 32 5    • RDW 09/10/2022 15 1    • MPV 09/10/2022 11 4    • Platelets 67/86/8773 242    • Neutrophils Relative 09/10/2022 70    • Lymphocytes Relative 09/10/2022 21    • Monocytes Relative 09/10/2022 6    • Eosinophils Relative 09/10/2022 3    • Basophils Relative 09/10/2022 0    • Neutrophils Absolute 09/10/2022 7 03    • Lymphocytes Absolute 09/10/2022 2 07    • Monocytes Absolute 09/10/2022 0 58    • Eosinophils Absolute 09/10/2022 0 25    • Basophils Absolute 09/10/2022 0 04    • Sodium 09/10/2022 138    • Potassium 09/10/2022 3 5    • Chloride 09/10/2022 102    • CO2 09/10/2022 28    • ANION GAP 09/10/2022 8    • BUN 09/10/2022 32 (A)   • Creatinine 09/10/2022 1 66 (A)   • Glucose 09/10/2022 222 (A)   • Calcium 09/10/2022 8 7    • AST 09/10/2022 18    • ALT 09/10/2022 21    • Alkaline Phosphatase 09/10/2022 89    • Total Protein 09/10/2022 6 3 (A)   • Albumin 09/10/2022 3 8    • Total Bilirubin 09/10/2022 0 40    • eGFR 09/10/2022 30    • Lipase 09/10/2022 22    • hs TnI 0hr 09/10/2022 4    • Ventricular Rate 09/10/2022 62    • Atrial Rate 09/10/2022 62    • WI Interval 09/10/2022 246    • QRSD Interval 09/10/2022 84    • QT Interval 09/10/2022 458    • QTC Interval 09/10/2022 464    • P Axis 09/10/2022 53    • QRS Axis 09/10/2022 -7    • T Wave Axis 09/10/2022 117    • hs TnI 2hr 09/10/2022 4 5    • Delta 2hr hsTnI 09/10/2022 0 5    Office Visit on 08/11/2022   Component Date Value   •  COLOR,UA 08/11/2022 yellow    • CLARITY,UA 08/11/2022 cloudy    • SPECIFIC GRAVITY,UA 08/11/2022 1 020    •  PH,UA 08/11/2022 6 0    • LEUKOCYTE ESTERASE,UA 08/11/2022 pos    • NITRITE,UA 08/11/2022 neg    • GLUCOSE, UA 08/11/2022 neg    • KETONES,UA 08/11/2022 neg    • BILIRUBIN,UA 08/11/2022 neg    • BLOOD,UA 08/11/2022 pos    • POCT URINE PROTEIN 08/11/2022 neg    • SL AMB POCT UROBILINOGEN 08/11/2022 neg    • Urine Culture 08/11/2022 60,000-69,000 cfu/ml Enterobacter cloacae (A)   • Color, UA 08/11/2022 Yellow    • Clarity, UA 08/11/2022 Turbid    • Specific Gravity, UA 08/11/2022 1 009    • pH, UA 08/11/2022 6 5    • Leukocytes, UA 08/11/2022 Large (A)   • Nitrite, UA 08/11/2022 Negative    • Protein, UA 08/11/2022 70 (1+) (A)   • Glucose, UA 08/11/2022 Negative    • Ketones, UA 08/11/2022 Negative    • Urobilinogen, UA 08/11/2022 <2 0    • Bilirubin, UA 08/11/2022 Negative    • Occult Blood, UA 08/11/2022 Trace (A)   • RBC, UA 08/11/2022 2-4 (A)   • WBC, UA 08/11/2022 Innumerable (A)   • Epithelial Cells 08/11/2022 None Seen    • Bacteria, UA 08/11/2022 Moderate (A) • WBC Clumps 08/11/2022 Present    Appointment on 08/11/2022   Component Date Value   • Sodium 08/11/2022 141    • Potassium 08/11/2022 3 9    • Chloride 08/11/2022 106    • CO2 08/11/2022 29    • ANION GAP 08/11/2022 6    • BUN 08/11/2022 28 (A)   • Creatinine 08/11/2022 1 78 (A)   • Glucose, Fasting 08/11/2022 128 (A)   • Calcium 08/11/2022 9 1    • eGFR 08/11/2022 27    Appointment on 08/02/2022   Component Date Value   • Sodium 08/02/2022 138    • Potassium 08/02/2022 4 2    • Chloride 08/02/2022 107    • CO2 08/02/2022 27    • ANION GAP 08/02/2022 4    • BUN 08/02/2022 37 (A)   • Creatinine 08/02/2022 1 96 (A)   • Glucose, Fasting 08/02/2022 239 (A)   • Calcium 08/02/2022 8 8    • eGFR 08/02/2022 24    Appointment on 07/25/2022   Component Date Value   • Sodium 07/25/2022 138    • Potassium 07/25/2022 3 9    • Chloride 07/25/2022 101    • CO2 07/25/2022 29    • ANION GAP 07/25/2022 8    • BUN 07/25/2022 48 (A)   • Creatinine 07/25/2022 1 85 (A)   • Glucose 07/25/2022 191 (A)   • Calcium 07/25/2022 9 2    • eGFR 07/25/2022 26    Admission on 07/20/2022, Discharged on 07/22/2022   Component Date Value   • WBC 07/20/2022 9 91    • RBC 07/20/2022 4 12    • Hemoglobin 07/20/2022 12 0    • Hematocrit 07/20/2022 38 1    • MCV 07/20/2022 93    • MCH 07/20/2022 29 1    • MCHC 07/20/2022 31 5    • RDW 07/20/2022 14 3    • MPV 07/20/2022 12 1    • Platelets 19/81/9379 233    • nRBC 07/20/2022 0    • Neutrophils Relative 07/20/2022 69    • Immat GRANS % 07/20/2022 1    • Lymphocytes Relative 07/20/2022 20    • Monocytes Relative 07/20/2022 7    • Eosinophils Relative 07/20/2022 3    • Basophils Relative 07/20/2022 0    • Neutrophils Absolute 07/20/2022 6 97    • Immature Grans Absolute 07/20/2022 0 06    • Lymphocytes Absolute 07/20/2022 1 93    • Monocytes Absolute 07/20/2022 0 68    • Eosinophils Absolute 07/20/2022 0 26    • Basophils Absolute 07/20/2022 0 01    • Sodium 07/20/2022 138    • Potassium 07/20/2022 5 3 • Chloride 07/20/2022 104    • CO2 07/20/2022 27    • ANION GAP 07/20/2022 7    • BUN 07/20/2022 31 (A)   • Creatinine 07/20/2022 1 87 (A)   • Glucose 07/20/2022 137    • Calcium 07/20/2022 8 5    • AST 07/20/2022 92 (A)   • ALT 07/20/2022 49    • Alkaline Phosphatase 07/20/2022 92    • Total Protein 07/20/2022 6 6    • Albumin 07/20/2022 3 7    • Total Bilirubin 07/20/2022 0 39    • eGFR 07/20/2022 26    • SARS-CoV-2 07/20/2022 Positive (A)   • INFLUENZA A PCR 07/20/2022 Negative    • INFLUENZA B PCR 07/20/2022 Negative    • RSV PCR 07/20/2022 Negative    • pH, Tato 07/20/2022 7  301    • pCO2, Tato 07/20/2022 49 7    • pO2, Tato 07/20/2022 48 1 (A)   • HCO3, Tato 07/20/2022 24 0    • Base Excess, Tato 07/20/2022 -2 8    • O2 Content, Tato 07/20/2022 13 6    • O2 HGB, VENOUS 07/20/2022 78 0    • D-Dimer, Quant 07/20/2022 0 58 (A)   • PTT 07/20/2022 26    • Protime 07/20/2022 12 4    • INR 07/20/2022 0 93    • POC Glucose 07/20/2022 90    • Ventricular Rate 07/20/2022 73    • Atrial Rate 07/20/2022 69    • TN Interval 07/20/2022 228    • QRSD Interval 07/20/2022 88    • QT Interval 07/20/2022 388    • QTC Interval 07/20/2022 427    • P Axis 07/20/2022 55    • QRS Axis 07/20/2022 39    • T Wave Axis 07/20/2022 121    • Ventricular Rate 07/20/2022 67    • Atrial Rate 07/20/2022 67    • TN Interval 07/20/2022 226    • QRSD Interval 07/20/2022 84    • QT Interval 07/20/2022 404    • QTC Interval 07/20/2022 426    • P Axis 07/20/2022 53    • QRS Axis 07/20/2022 10    • T Wave Axis 07/20/2022 50    • Ventricular Rate 07/20/2022 61    • Atrial Rate 07/20/2022 61    • TN Interval 07/20/2022 262    • QRSD Interval 07/20/2022 80    • QT Interval 07/20/2022 426    • QTC Interval 07/20/2022 428    • P Axis 07/20/2022 55    • QRS Axis 07/20/2022 21    • T Wave Axis 07/20/2022 56    • PTT 07/20/2022 76 (A)   • POC Glucose 07/20/2022 206 (A)   • POC Glucose 07/21/2022 226 (A)   • PTT 07/21/2022 88 (A)   • WBC 07/21/2022 14 72 (A)   • RBC 07/21/2022 3 70 (A)   • Hemoglobin 07/21/2022 10 6 (A)   • Hematocrit 07/21/2022 33 2 (A)   • MCV 07/21/2022 90    • MCH 07/21/2022 28 6    • MCHC 07/21/2022 31 9    • RDW 07/21/2022 13 9    • MPV 07/21/2022 11 9    • Platelets 65/70/6986 207    • nRBC 07/21/2022 0    • Neutrophils Relative 07/21/2022 89 (A)   • Immat GRANS % 07/21/2022 1    • Lymphocytes Relative 07/21/2022 7 (A)   • Monocytes Relative 07/21/2022 3 (A)   • Eosinophils Relative 07/21/2022 0    • Basophils Relative 07/21/2022 0    • Neutrophils Absolute 07/21/2022 13 13 (A)   • Immature Grans Absolute 07/21/2022 0 11    • Lymphocytes Absolute 07/21/2022 1 03    • Monocytes Absolute 07/21/2022 0 43    • Eosinophils Absolute 07/21/2022 0 00    • Basophils Absolute 07/21/2022 0 02    • Sodium 07/21/2022 134 (A)   • Potassium 07/21/2022 4 4    • Chloride 07/21/2022 103    • CO2 07/21/2022 25    • ANION GAP 07/21/2022 6    • BUN 07/21/2022 33 (A)   • Creatinine 07/21/2022 1 71 (A)   • Glucose 07/21/2022 250 (A)   • Calcium 07/21/2022 8 5    • Corrected Calcium 07/21/2022 9 0    • AST 07/21/2022 38    • ALT 07/21/2022 33    • Alkaline Phosphatase 07/21/2022 86    • Total Protein 07/21/2022 5 7 (A)   • Albumin 07/21/2022 3 4 (A)   • Total Bilirubin 07/21/2022 0 27    • eGFR 07/21/2022 29    • CRP 07/21/2022 25 3 (A)   • Procalcitonin 07/21/2022 0 08    • POC Glucose 07/21/2022 240 (A)   • POC Glucose 07/20/2022 215 (A)   • POC Glucose 07/21/2022 124    • POC Glucose 07/22/2022 297 (A)   • PTT 07/22/2022 55 (A)   • WBC 07/22/2022 14 08 (A)   • RBC 07/22/2022 3 59 (A)   • Hemoglobin 07/22/2022 10 6 (A)   • Hematocrit 07/22/2022 32 1 (A)   • MCV 07/22/2022 89    • MCH 07/22/2022 29 5    • MCHC 07/22/2022 33 0    • RDW 07/22/2022 14 2    • MPV 07/22/2022 12 2    • Platelets 50/65/2965 208    • nRBC 07/22/2022 0    • Neutrophils Relative 07/22/2022 85 (A)   • Immat GRANS % 07/22/2022 1    • Lymphocytes Relative 07/22/2022 9 (A)   • Monocytes Relative 07/22/2022 5    • Eosinophils Relative 07/22/2022 0    • Basophils Relative 07/22/2022 0    • Neutrophils Absolute 07/22/2022 12 03 (A)   • Immature Grans Absolute 07/22/2022 0 11    • Lymphocytes Absolute 07/22/2022 1 26    • Monocytes Absolute 07/22/2022 0 65    • Eosinophils Absolute 07/22/2022 0 00    • Basophils Absolute 07/22/2022 0 03    • Sodium 07/22/2022 138    • Potassium 07/22/2022 4 1    • Chloride 07/22/2022 104    • CO2 07/22/2022 27    • ANION GAP 07/22/2022 7    • BUN 07/22/2022 41 (A)   • Creatinine 07/22/2022 1 65 (A)   • Glucose 07/22/2022 161 (A)   • Calcium 07/22/2022 8 8    • AST 07/22/2022 21    • ALT 07/22/2022 23    • Alkaline Phosphatase 07/22/2022 81    • Total Protein 07/22/2022 5 9 (A)   • Albumin 07/22/2022 3 5    • Total Bilirubin 07/22/2022 0 24    • eGFR 07/22/2022 30    • CRP 07/22/2022 19 4 (A)   • POC Glucose 07/22/2022 59 (A)   • POC Glucose 07/22/2022 100    Admission on 07/19/2022, Discharged on 07/19/2022   Component Date Value   • POC Glucose 07/19/2022 129    • POC Glucose 07/19/2022 116    • POC Glucose 07/20/2022     • POC Glucose 07/21/2022 167 (A)   • POC Glucose 07/21/2022 251 (A)   • POC Glucose 07/22/2022 126    Hospital Outpatient Visit on 07/18/2022   Component Date Value   • Case Report 07/18/2022                      Value:Surgical Pathology Report                         Case: [id number]                                   Authorizing Provider:  Nikhil Abarca MD        Collected:           07/18/2022 0933              Ordering Location:     [address] Breast Received:            07/18/2022 [facility]                                                              Pathologist:           Gold Cee MD                                                                  Specimen:    Breast, Right, US right brst bx 100 5cmfn, 3 passes, 12g marquee                          • Final Diagnosis 07/18/2022 Value:This result contains rich text formatting which cannot be displayed here  • Microscopic Description 07/18/2022                      Value: This result contains rich text formatting which cannot be displayed here  • Additional Information 07/18/2022                      Value: This result contains rich text formatting which cannot be displayed here  • Gross Description 07/18/2022                      Value: This result contains rich text formatting which cannot be displayed here  • Clinical Information 07/18/2022                      Value:Fixed in formalin  Please contact Isabel Cronin at [facility] with results at     RIGHT  B) FOCAL ASYMMETRY  Mammo diagnostic right w 3d & cad: There is a focal asymmetry seen in the upper inner quadrant of the right breast    US breast right limited (diagnostic): There is a 10 mm x 5 mm x 11 mm irregularly shaped, hypoechoic mass with indistinct margins seen in the upper inner quadrant of the right breast at 1 o'clock, 5 cm from the nipple  No right axillary adenopathy is identified  There may be more visits with results that are not included  Lab Results   Component Value Date    TRIG 226 (H) 09/19/2022    HDL 37 (L) 09/19/2022     Imaging: No results found  Review of Systems:  Review of Systems   Constitutional: Negative for activity change, appetite change, chills, diaphoresis, fatigue and unexpected weight change  HENT: Negative for hearing loss, nosebleeds and sore throat  Eyes: Negative for photophobia and visual disturbance  Respiratory: Negative for cough, chest tightness, shortness of breath and wheezing  Cardiovascular: Negative for chest pain, palpitations and leg swelling  Gastrointestinal: Negative for abdominal pain, diarrhea, nausea and vomiting  Endocrine: Negative for polyuria  Genitourinary: Negative for dysuria, frequency and hematuria     Musculoskeletal: Negative for arthralgias, back pain, gait problem and neck pain    Skin: Negative for pallor and rash  Neurological: Negative for dizziness, syncope and headaches  Hematological: Does not bruise/bleed easily  Psychiatric/Behavioral: Negative for behavioral problems and confusion  Physical Exam:  Physical Exam  Vitals reviewed  Constitutional:       Appearance: She is well-developed  She is not diaphoretic  HENT:      Head: Normocephalic and atraumatic  Nose: Nose normal    Eyes:      General: No scleral icterus  Pupils: Pupils are equal, round, and reactive to light  Neck:      Vascular: No JVD  Cardiovascular:      Rate and Rhythm: Normal rate and regular rhythm  Heart sounds: Normal heart sounds  No murmur heard  No friction rub  No gallop  Pulmonary:      Effort: Pulmonary effort is normal  No respiratory distress  Breath sounds: Normal breath sounds  No wheezing or rales  Abdominal:      General: Bowel sounds are normal  There is no distension  Palpations: Abdomen is soft  Tenderness: There is no abdominal tenderness  Musculoskeletal:         General: No deformity  Normal range of motion  Cervical back: Normal range of motion and neck supple  Skin:     General: Skin is warm and dry  Findings: No rash  Neurological:      Mental Status: She is alert and oriented to person, place, and time  Cranial Nerves: No cranial nerve deficit  Psychiatric:         Behavior: Behavior normal        Blood pressure 140/76, pulse 65, height 5' 3" (1 6 m), weight 76 3 kg (168 lb 3 2 oz), SpO2 94 %  Discussion/Summary:  Chest pain:  She does have risk factors of age, HTN, HLD, post-menopausal, family history of CAD, former smoker and had a stress test in June 2018  We rechecked stress testing in Sept 2021 with new symptoms, which was normal   Currently symptom free and doing well, no additional testing recommended at this time      Pulm HTN: PASP noted to be 31 mmHg in June 2018 - which is not in the range of pulm HTN, so unlikely to be contributing to symptoms  Continue to follow serially with symptoms - mild elevations likely in the setting of COPD exacerbations  HTN: continued on amlodipine, relatively well controlled today - increased to 5mg twice daily - is being managed by Dr Joanie Hazel from nephrology - now back to daily dosing due to lower BPs  Continue current regimen for now, apical for age  HLD: continued on zetia and statin with an LDL of 6 in May 2019  TG are 307 - which will not be helped by the statin or zetia  We stopped both and started on wellchol instead of Tricor since that hasn't done much - but stopped by her PCP  Continues on fish oil  Recheck revealed TG of 215, which is much improved  This will be continued to be followed by her PCP  LDL 34 in May 2021, at goal - was unable to be calculated in Oct 2021 due to significantly elevated TG of 450 - recommended to have tighter BS control and watching carbohydrates in diet  LDL 26  in April 2022 - much improved and at goal   LDL now 21 with TG of 226 in September 2022, advised to be more cautious with her diet again

## 2022-10-25 ENCOUNTER — TELEPHONE (OUTPATIENT)
Dept: NEPHROLOGY | Facility: CLINIC | Age: 72
End: 2022-10-25

## 2022-10-25 ENCOUNTER — APPOINTMENT (OUTPATIENT)
Dept: LAB | Facility: AMBULARY SURGERY CENTER | Age: 72
End: 2022-10-25
Payer: MEDICARE

## 2022-10-25 DIAGNOSIS — R80.1 PERSISTENT PROTEINURIA: ICD-10-CM

## 2022-10-25 DIAGNOSIS — I12.9 HYPERTENSIVE CHRONIC KIDNEY DISEASE WITH STAGE 1 THROUGH STAGE 4 CHRONIC KIDNEY DISEASE, OR UNSPECIFIED CHRONIC KIDNEY DISEASE: ICD-10-CM

## 2022-10-25 DIAGNOSIS — N18.4 STAGE 4 CHRONIC KIDNEY DISEASE (HCC): ICD-10-CM

## 2022-10-25 DIAGNOSIS — N18.31 CHRONIC KIDNEY DISEASE (CKD) STAGE G3A/A1, MODERATELY DECREASED GLOMERULAR FILTRATION RATE (GFR) BETWEEN 45-59 ML/MIN/1.73 SQUARE METER AND ALBUMINURIA CREATININE RATIO LESS THAN 30 MG/G (HCC): ICD-10-CM

## 2022-10-25 LAB
ANION GAP SERPL CALCULATED.3IONS-SCNC: 5 MMOL/L (ref 4–13)
BUN SERPL-MCNC: 37 MG/DL (ref 5–25)
CALCIUM SERPL-MCNC: 9 MG/DL (ref 8.3–10.1)
CHLORIDE SERPL-SCNC: 104 MMOL/L (ref 96–108)
CO2 SERPL-SCNC: 27 MMOL/L (ref 21–32)
CREAT SERPL-MCNC: 2.07 MG/DL (ref 0.6–1.3)
GFR SERPL CREATININE-BSD FRML MDRD: 23 ML/MIN/1.73SQ M
GLUCOSE SERPL-MCNC: 204 MG/DL (ref 65–140)
POTASSIUM SERPL-SCNC: 4 MMOL/L (ref 3.5–5.3)
SODIUM SERPL-SCNC: 136 MMOL/L (ref 135–147)

## 2022-10-25 PROCEDURE — 36415 COLL VENOUS BLD VENIPUNCTURE: CPT

## 2022-10-25 PROCEDURE — 80048 BASIC METABOLIC PNL TOTAL CA: CPT

## 2022-10-25 RX ORDER — OLMESARTAN MEDOXOMIL 20 MG/1
TABLET ORAL
Qty: 135 TABLET | Refills: 3 | Status: SHIPPED | OUTPATIENT
Start: 2022-10-25 | End: 2022-10-27 | Stop reason: SDUPTHER

## 2022-10-25 NOTE — TELEPHONE ENCOUNTER
Patient left message stating she was trying to refill her olmesartan but the pharmacy told her it was too soon to refill  She states her dosage increased from 1/2 tablet to one and a half tablets daily  A new prescription will need to be sent to her pharmacy   Patient is requesting a call back at [phone]

## 2022-10-26 DIAGNOSIS — I12.9 HYPERTENSIVE CHRONIC KIDNEY DISEASE WITH STAGE 1 THROUGH STAGE 4 CHRONIC KIDNEY DISEASE, OR UNSPECIFIED CHRONIC KIDNEY DISEASE: ICD-10-CM

## 2022-10-26 DIAGNOSIS — R80.1 PERSISTENT PROTEINURIA: ICD-10-CM

## 2022-10-26 DIAGNOSIS — N18.31 CHRONIC KIDNEY DISEASE (CKD) STAGE G3A/A1, MODERATELY DECREASED GLOMERULAR FILTRATION RATE (GFR) BETWEEN 45-59 ML/MIN/1.73 SQUARE METER AND ALBUMINURIA CREATININE RATIO LESS THAN 30 MG/G (HCC): ICD-10-CM

## 2022-10-26 NOTE — TELEPHONE ENCOUNTER
Got a message from pharmacy saying the insurance is rejecting her medication because they will not cover more than one tablet a day  Please advise

## 2022-10-27 ENCOUNTER — TELEPHONE (OUTPATIENT)
Dept: NEPHROLOGY | Facility: CLINIC | Age: 72
End: 2022-10-27

## 2022-10-27 DIAGNOSIS — I12.9 HYPERTENSIVE CHRONIC KIDNEY DISEASE WITH STAGE 1 THROUGH STAGE 4 CHRONIC KIDNEY DISEASE, OR UNSPECIFIED CHRONIC KIDNEY DISEASE: Primary | ICD-10-CM

## 2022-10-27 RX ORDER — OLMESARTAN MEDOXOMIL 5 MG/1
10 TABLET ORAL DAILY
Qty: 180 TABLET | Refills: 3 | Status: SHIPPED | OUTPATIENT
Start: 2022-10-27

## 2022-10-27 RX ORDER — OLMESARTAN MEDOXOMIL 20 MG/1
TABLET ORAL
Qty: 135 TABLET | Refills: 3 | Status: SHIPPED | OUTPATIENT
Start: 2022-10-27

## 2022-10-27 NOTE — TELEPHONE ENCOUNTER
----- Message from Candice Carmichael PA-C sent at 10/26/2022  7:51 PM EDT -----  Labs reviewed and relatively stable  Creatinine slightly elevated with increase of ARB but within baseline  Please call pt and let her know labs are stable  Stay well hydrated and repeat labs in 2 weeks

## 2022-10-30 DIAGNOSIS — K21.9 GASTROESOPHAGEAL REFLUX DISEASE, UNSPECIFIED WHETHER ESOPHAGITIS PRESENT: ICD-10-CM

## 2022-10-30 RX ORDER — PANTOPRAZOLE SODIUM 40 MG/1
TABLET, DELAYED RELEASE ORAL
Qty: 180 TABLET | Refills: 2 | Status: SHIPPED | OUTPATIENT
Start: 2022-10-30

## 2022-11-04 LAB
CREAT ?TM UR-SCNC: 58.1 UMOL/L
EXT MICROALBUMIN URINE RANDOM: 72.1
HBA1C MFR BLD HPLC: 6.7 %
MICROALBUMIN/CREAT UR: 1241 MG/G{CREAT}

## 2022-11-17 ENCOUNTER — DOCUMENTATION (OUTPATIENT)
Dept: NEPHROLOGY | Facility: CLINIC | Age: 72
End: 2022-11-17

## 2022-11-17 ENCOUNTER — TELEPHONE (OUTPATIENT)
Dept: NEPHROLOGY | Facility: CLINIC | Age: 72
End: 2022-11-17

## 2022-11-17 RX ORDER — CALCIUM CITRATE 1040MG
500 TABLET ORAL 3 TIMES DAILY
COMMUNITY
Start: 2022-11-16

## 2022-11-17 NOTE — TELEPHONE ENCOUNTER
Pt called office stating that she saw endo ([facility]) and had some blood work done- results in [address] and ionized Ca very low, she was asked to start taking calcium 1800 mg daily  Wanted to make sure that Dr Vicky Moon agreeable

## 2022-11-21 DIAGNOSIS — G25.81 RLS (RESTLESS LEGS SYNDROME): ICD-10-CM

## 2022-11-22 RX ORDER — PRAMIPEXOLE DIHYDROCHLORIDE 0.25 MG/1
0.25 TABLET ORAL
Qty: 90 TABLET | Refills: 0 | Status: SHIPPED | OUTPATIENT
Start: 2022-11-22

## 2022-11-28 DIAGNOSIS — I10 ESSENTIAL HYPERTENSION: ICD-10-CM

## 2022-11-28 DIAGNOSIS — N18.30 CHRONIC KIDNEY DISEASE, STAGE III (MODERATE) (HCC): ICD-10-CM

## 2022-11-28 DIAGNOSIS — I12.9 HYPERTENSIVE CHRONIC KIDNEY DISEASE WITH STAGE 1 THROUGH STAGE 4 CHRONIC KIDNEY DISEASE, OR UNSPECIFIED CHRONIC KIDNEY DISEASE: ICD-10-CM

## 2022-11-28 RX ORDER — AMLODIPINE BESYLATE 5 MG/1
5 TABLET ORAL DAILY
Qty: 90 TABLET | Refills: 0 | Status: SHIPPED | OUTPATIENT
Start: 2022-11-28

## 2022-11-30 ENCOUNTER — TELEPHONE (OUTPATIENT)
Dept: FAMILY MEDICINE CLINIC | Facility: CLINIC | Age: 72
End: 2022-11-30

## 2022-12-01 NOTE — TELEPHONE ENCOUNTER
Phone call placed to pt to triage sx  Pt states she went to an urgent care this morning  Was given Doxycycline and dx with URI/sinus infection   Told pt to reach out if she needs anything additional

## 2022-12-08 ENCOUNTER — OFFICE VISIT (OUTPATIENT)
Dept: GASTROENTEROLOGY | Facility: AMBULARY SURGERY CENTER | Age: 72
End: 2022-12-08

## 2022-12-08 VITALS
DIASTOLIC BLOOD PRESSURE: 80 MMHG | OXYGEN SATURATION: 100 % | HEIGHT: 63 IN | SYSTOLIC BLOOD PRESSURE: 128 MMHG | RESPIRATION RATE: 18 BRPM | WEIGHT: 170.9 LBS | BODY MASS INDEX: 30.28 KG/M2 | HEART RATE: 67 BPM

## 2022-12-08 DIAGNOSIS — K31.84 GASTROPARESIS DIABETICORUM (HCC): Primary | ICD-10-CM

## 2022-12-08 DIAGNOSIS — E11.43 GASTROPARESIS DIABETICORUM (HCC): Primary | ICD-10-CM

## 2022-12-08 NOTE — PROGRESS NOTES
Follow-up Note -  [facility]  Peter Beauchamp 1950 67 y o  female         Reason: Follow-up; GERD, gastroparesis    HPI: 65-year-old female with history of diabetes, chronic kidney disease, COPD who presents for follow-up; she was seen in our office in September with our physician assistant Sofía Huang, at that time with some worsened GERD symptoms and vomiting after some recent dietary changes  She was recommended to go back to twice daily dosing of her PPI for 4 weeks and then taper back down to once daily dosing as she had previously been doing, and was also advised again about gastroparesis diet  She had previously undergone pyloric Botox injection for gastroparesis twice, most recently in May 2022  Her last colonoscopy had been done in October 2021 showing a few polyps for which she was recommended surveillance colonoscopy in 3 years  At this time, the patient says that when she increase her PPI to twice daily, her symptomatology became much better  She says however that once she tried to go back to once daily dosing, she did okay for about a week but then started again with episodes of epigastric pain and then vomiting, similar to the symptoms she was experiencing before  She says she then went back to twice daily dosing and has once again been doing okay  She is asking about if she would benefit from another Botox injection at this point, although again reports to be doing better with the twice daily PPI  She also continues to take Pepcid once daily  Most recent hemoglobin A1c was 6 7  She otherwise denies any vomiting at this time, any worsening of bowel habits, any rectal bleeding or melena  Denies any dysphagia  REVIEW OF SYSTEMS:      CONSTITUTIONAL: Denies any fever, chills, or rigors  Good appetite, and no recent weight loss  HEENT: No earache or tinnitus  Denies hearing loss or visual disturbances  CARDIOVASCULAR: No chest pain or palpitations  RESPIRATORY: Denies any cough, hemoptysis, shortness of breath or dyspnea on exertion  GASTROINTESTINAL: As noted in the History of Present Illness  GENITOURINARY: No problems with urination  Denies any hematuria or dysuria  NEUROLOGIC: No dizziness or vertigo, denies headaches  MUSCULOSKELETAL: Denies any muscle or joint pain  SKIN: Denies skin rashes or itching  ENDOCRINE: Denies excessive thirst  Denies intolerance to heat or cold  PSYCHOSOCIAL: Denies depression or anxiety  Denies any recent memory loss  Past Medical History:   Diagnosis Date   • Allergic    • Allergic rhinitis    • Anesthesia     pt reports "had to use double lumen endo tube for hiatal hernia repair/so surgeon could get to where he needed to work"   • Arthritis    • Aspiration into airway    • Basal cell carcinoma 2007    left cheek    • BCC (basal cell carcinoma) 05/27/2021    Left Nasal tip   • Cancer (HCC)     squamous cell cancer on forhead   • Cancer ([facility] [address] )     basal cell on nose    • Chronic kidney disease 2000, 2018    Stones, kidney disease stage 4   • Chronic pain disorder     bilat feet and joint pain on occas   • Colon polyp    • Constipation    • COPD (chronic obstructive pulmonary disease) ([facility] [address] )    • COVID-19 08/2021    recovered at home/did receive monoclonal infusion   • Dental bridge present    • Depression    • Diabetes mellitus ([facility] [address] )     Type 2   • Diabetic neuropathy ([facility] [address] )    • Disease of thyroid gland    • Family history of thyroid problem    • Fatty liver    • GERD (gastroesophageal reflux disease)    • Heart burn    • Hiatal hernia    • History of pneumonia    • Hyperlipidemia    • Hyperplasia, parathyroid ([facility] [address] )    • Hypertension    • Kidney problem    • Kidney stone    • Memory loss Julu2 2020   • Motion sickness    • Motion sickness    • Neck pain    • Obesity 1978   • Obesity (BMI 30 0-34  9)    • Pollen allergies    • RLS (restless legs syndrome)    • SCC (squamous cell carcinoma) 05/04/2021    left mid forehead   • Seasonal allergies    • Sleep apnea     has inspire   • Squamous cell skin cancer 2007    left cheek    • Swollen ankles    • 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, left     great toe fusion   • Urinary incontinence    • Urinary tract infection 03/28/2022   • Wears glasses       Past Surgical History:   Procedure Laterality Date   • ARTHROSCOPY KNEE     • BREAST BIOPSY      stereotactic-benign   • BREAST BIOPSY      stereo-benign   • BREAST EXCISIONAL BIOPSY      unknown date-benign   • BREAST EXCISIONAL BIOPSY      unknown date-benign   • BREAST EXCISIONAL BIOPSY      unknown date-benign   • BREAST EXCISIONAL BIOPSY      unknown age-benign   • CARDIAC CATHETERIZATION     • CHOLECYSTECTOMY     • COLONOSCOPY     • EXAMINATION UNDER ANESTHESIA N/A 06/24/2021    Procedure: EXAM UNDER ANESTHESIA (EUA), DISE;  Surgeon: Frank Pappas MD;  Location: [facility];  Service: ENT   • HERNIA REPAIR     • HIATAL HERNIA REPAIR     • KNEE SURGERY      Torn maniscus lap surg   • LIPOSUCTION     • LYMPH NODE BIOPSY     • MOHS SURGERY  05/20/2021    left mid forehead-Gautam   • MOHS SURGERY Left 05/27/2021    Left nasal tip- gautam    • NM FOOT/TOES SURGERY PROC UNLISTED Right 07/19/2022    Procedure: 1st metatarsal phalangeal joint fusion;  Surgeon: Angela Mir DPM;  Location: [facility];  Service: Podiatry   • NM INCISION IMPLANT CRANIAL NERVE STIM ELECTRODE/PULSE GEN N/A 11/10/2021    Procedure: INSERTION UPPER AIRWAY STIMULATOR, INSPIRE IMPLANT;  Surgeon: Frank Pappas MD;  Location: [facility];  Service: ENT   • REDUCTION MAMMAPLASTY Bilateral 2000   • REDUCTION MAMMAPLASTY     • SKIN BIOPSY     • SKIN CANCER EXCISION  2007    squamous cell carcinoma    • SKIN CANCER EXCISION  2007    basal cell carcinoma   • SQUAMOUS CELL CARCINOMA EXCISION     • TOE SURGERY Right 08/04/2022    Right Great Toe Fusion   • TONSILLECTOMY     • TUBAL LIGATION     • UPPER GASTROINTESTINAL ENDOSCOPY     • US GUIDED BREAST BIOPSY RIGHT COMPLETE Right 2022     Social History     Socioeconomic History   • Marital status: /Civil Union     Spouse name: Not on file   • Number of children: Not on file   • Years of education: Not on file   • Highest education level: Not on file   Occupational History   • Occupation: RETIRED   Tobacco Use   • Smoking status: Former     Packs/day: 2 00     Years: 10 00     Pack years: 20 00     Types: Cigarettes     Start date: 1968     Quit date: 1976     Years since quittin 9   • Smokeless tobacco: Never   • Tobacco comments:     Smoked 2 pack a day   Vaping Use   • Vaping Use: Never used   Substance and Sexual Activity   • Alcohol use: Yes     Comment: 1 or 2 a year   • Drug use: No   • Sexual activity: Not Currently     Partners: Male     Birth control/protection: Abstinence, Post-menopausal, Female Sterilization     Comment: defer   Other Topics Concern   • Not on file   Social History Narrative   • Not on file     Social Determinants of Health     Financial Resource Strain: Not on file   Food Insecurity: Not on file   Transportation Needs: Not on file   Physical Activity: Not on file   Stress: Not on file   Social Connections: Not on file   Intimate Partner Violence: Not on file   Housing Stability: Not on file     Family History   Problem Relation Age of Onset   • Heart disease Mother    • Depression Mother    • Hypertension Mother    • COPD Mother    • Hearing loss Mother    • Anxiety disorder Mother    • Heart disease Father    • Lung cancer Father 79        Smoker    • Cancer Father         brain   • Alcohol abuse Father    • Dementia Father    • Hypertension Father    • Thyroid disease Father    • COPD Father    • Arthritis Father    • Brain cancer Father 76   • Hypertension Sister    • Diabetes Sister    • Heart disease Sister    • Thyroid disease Sister    • Cancer Sister         Lympoma, lung   • Lung cancer Sister 78   • Anxiety disorder Sister    • Hypertension Brother • Diabetes Brother    • Cancer Brother         Throat   • Dementia Brother    • Stroke Brother    • Hypertension Brother    • Heart disease Brother    • Diabetes Brother    • No Known Problems Son    • No Known Problems Son    • Brain cancer Paternal Aunt         unknown age   • Breast cancer Neg Hx      Sulfamethoxazole-trimethoprim and Ciprofloxacin  Current Outpatient Medications   Medication Sig Dispense Refill   • acetaminophen (TYLENOL) 325 mg tablet Take 2 tablets (650 mg total) by mouth every 6 (six) hours as needed for mild pain, headaches or fever  0   • albuterol (Ventolin HFA) 90 mcg/act inhaler Inhale 2 puffs every 6 (six) hours as needed for wheezing 54 g 0   • amLODIPine (NORVASC) 5 mg tablet Take 1 tablet (5 mg total) by mouth daily 90 tablet 0   • b complex vitamins capsule Take 1 capsule by mouth daily     • B-D ULTRAFINE III SHORT PEN 31G X 8 MM MISC USE AS DIRECTED 4 TIMES PER DAY  3   • Biotin w/ Vitamins C & E (HAIR SKIN & NAILS GUMMIES PO) Take by mouth     • Calcium Citrate 1040 MG TABS Take 500 mg by mouth Three times a day     • DULoxetine (CYMBALTA) 30 mg delayed release capsule Take 30 mg by mouth 2 (two) times a day     • estradiol (ESTRACE VAGINAL) 0 1 mg/g vaginal cream Insert 1 g into the vagina 3 (three) times a week 42 5 g 3   • famotidine (PEPCID) 10 mg tablet Take 2 tablets (20 mg total) by mouth daily at bedtime 180 tablet 2   • gabapentin (Neurontin) 300 mg capsule Take 1 capsule (300 mg total) by mouth daily at bedtime 30 capsule 3   • insulin aspart (NovoLOG) 100 units/mL injection Inject under the skin 3 (three) times a day before meals 16 units, 16 units, 25 units       • insulin detemir (Levemir FlexTouch) 100 Units/mL injection pen Inject 50 Units under the skin every evening      • Krill Oil (Omega-3) 500 MG CAPS Take 1,600 mg by mouth daily     • levothyroxine 112 mcg tablet Take 112 mcg by mouth every morning     • metoprolol tartrate (LOPRESSOR) 50 mg tablet TAKE 1 TABLET (50 MG TOTAL) BY MOUTH EVERY 12 (TWELVE) HOURS 180 tablet 3   • olmesartan (BENICAR) 20 mg tablet Take 1 tablet (20mg) by mouth in the morning daily 135 tablet 3   • olmesartan (BENICAR) 5 mg tablet Take 2 tablets (10 mg total) by mouth daily 180 tablet 3   • pantoprazole (PROTONIX) 40 mg tablet TAKE 1 TABLET BY MOUTH TWICE A  tablet 2   • pramipexole (MIRAPEX) 0 25 mg tablet Take 1 tablet (0 25 mg total) by mouth daily at bedtime 90 tablet 0   • Probiotic Product (PROBIOTIC PO) Take 1 capsule by mouth 2 (two) times a day     • torsemide (DEMADEX) 20 mg tablet TAKE 1 TABLET BY MOUTH EVERY DAY 90 tablet 3   • Vitamin D, Ergocalciferol, 2000 units CAPS Take 2,000 Units by mouth daily      • azelastine (ASTELIN) 0 1 % nasal spray 1 spray into each nostril 2 (two) times a day Use in each nostril as directed 30 mL 3   • BIOTIN PO Take by mouth (Patient not taking: Reported on 9/23/2022)     • calcium carbonate (TUMS) 500 mg chewable tablet Chew 1 tablet 2 (two) times a day (Patient not taking: Reported on 9/23/2022)     • rosuvastatin (CRESTOR) 20 MG tablet Take 1 tablet (20 mg total) by mouth daily 30 tablet 0   • sucralfate (CARAFATE) 1 g tablet Take 1 tablet (1 g total) by mouth 4 (four) times a day for 10 days Crush tablet and dissolve in 8oz water to make a sluree (Patient not taking: No sig reported) 40 tablet 0     No current facility-administered medications for this visit  Blood pressure 128/80, pulse 67, resp  rate 18, height 5' 3" (1 6 m), weight 77 5 kg (170 lb 14 4 oz), SpO2 100 %      PHYSICAL EXAM:      General Appearance:   Alert, cooperative, no distress, appears stated age    HEENT:   Normocephalic, atraumatic, anicteric      Neck:  Supple, symmetrical, trachea midline, no adenopathy;    thyroid: no enlargement/tenderness/nodules; no carotid  bruit or JVD    Lungs:   Clear to auscultation bilaterally; no rales, rhonchi or wheezing; respirations unlabored    Heart[de-identified]   S1 and S2 normal; regular rate and rhythm; no murmur, rub, or gallop  Abdomen:   Soft, non-tender, non-distended; normal bowel sounds; no masses, no organomegaly    Extremities: No edema, erythema, wounds, rashes   Rectal:   Deferred                      Lab Results   Component Value Date    WBC 8 20 09/19/2022    HGB 12 2 09/19/2022    HCT 37 3 09/19/2022    MCV 87 09/19/2022     09/19/2022     Lab Results   Component Value Date    GLUCOSE 168 (H) 11/30/2018    CALCIUM 9 0 10/25/2022    K 4 0 10/25/2022    CO2 27 10/25/2022     10/25/2022    BUN 37 (H) 10/25/2022    CREATININE 2 07 (H) 10/25/2022     Lab Results   Component Value Date    ALT 57 09/19/2022    AST 30 09/19/2022    ALKPHOS 104 09/19/2022     Lab Results   Component Value Date    INR 0 93 07/20/2022    INR 0 94 05/17/2021    INR 0 96 06/19/2020    PROTIME 12 4 07/20/2022    PROTIME 12 6 05/17/2021    PROTIME 12 2 06/19/2020       XR chest 2 views    Result Date: 9/10/2022  Impression: No acute cardiopulmonary disease  Workstation performed: [id number]       ASSESSMENT & PLAN:    Gastroparesis diabeticorum ([facility] [address] )    Lab Results   Component Value Date    HGBA1C 6 7 (H) 11/04/2022   Associated with symptoms of epigastric pain followed by vomiting, also acid reflux symptomatology, and had been noted with bile acid reflux on last EGD    She has had substantial improvement in symptoms after resuming twice daily PPI dosing; had worsening of symptoms with once daily dosing    -Continue PPI twice daily for now    -Advised patient that with regards to her inquiry of pursuing pyloric Botox injection, we generally pursue this only if symptoms are refractory to medical management and dietary changes, as this is an invasive procedure requiring anesthesia, also carries a less than 100% success rate, and can also be noted with diminishing returns of symptomatic improvement with successive treatments over time; therefore we will not pursue pyloric Botox injection currently    -Advised patient however that if she does have recurrence of her symptoms despite twice daily PPI and adherence to gastroparesis diet, then we can consider arranging for EGD with Botox injection into the pylorus, can also discuss about starting domperidone    -Advised patient again about gastroparesis diet and answered her questions    -As patient does report some mild constipation and having to strain with bowel movements, I suggested she increase her MiraLAX to once daily from once every other day as she has been doing     -Regarding history of colon polyps she will be due for colonoscopy in late 2024    -Can follow-up as needed otherwise

## 2022-12-08 NOTE — ASSESSMENT & PLAN NOTE
Lab Results   Component Value Date    HGBA1C 6 7 (H) 11/04/2022   Associated with symptoms of epigastric pain followed by vomiting, also acid reflux symptomatology, and had been noted with bile acid reflux on last EGD    She has had substantial improvement in symptoms after resuming twice daily PPI dosing; had worsening of symptoms with once daily dosing    -Continue PPI twice daily for now    -Advised patient that with regards to her inquiry of pursuing pyloric Botox injection, we generally pursue this only if symptoms are refractory to medical management and dietary changes, as this is an invasive procedure requiring anesthesia, also carries a less than 100% success rate, and can also be noted with diminishing returns of symptomatic improvement with successive treatments over time; therefore we will not pursue pyloric Botox injection currently    -Advised patient however that if she does have recurrence of her symptoms despite twice daily PPI and adherence to gastroparesis diet, then we can consider arranging for EGD with Botox injection into the pylorus, can also discuss about starting domperidone    -Advised patient again about gastroparesis diet and answered her questions    -As patient does report some mild constipation and having to strain with bowel movements, I suggested she increase her MiraLAX to once daily from once every other day as she has been doing     -Regarding history of colon polyps she will be due for colonoscopy in late 2024    -Can follow-up as needed otherwise

## 2022-12-13 ENCOUNTER — TELEPHONE (OUTPATIENT)
Dept: DERMATOLOGY | Facility: CLINIC | Age: 72
End: 2022-12-13

## 2022-12-15 ENCOUNTER — OFFICE VISIT (OUTPATIENT)
Dept: DERMATOLOGY | Facility: CLINIC | Age: 72
End: 2022-12-15

## 2022-12-15 VITALS — TEMPERATURE: 97.8 F | WEIGHT: 170 LBS | HEIGHT: 63 IN | BODY MASS INDEX: 30.12 KG/M2

## 2022-12-15 DIAGNOSIS — L57.0 ACTINIC KERATOSIS: Primary | ICD-10-CM

## 2022-12-15 RX ORDER — FLUOROURACIL 50 MG/G
CREAM TOPICAL
Qty: 40 G | Refills: 0 | Status: SHIPPED | OUTPATIENT
Start: 2022-12-15

## 2022-12-15 NOTE — PATIENT INSTRUCTIONS
ACTINIC KERATOSIS      Assessment and Plan:  Based on a thorough discussion of this condition and the management approach to it (including a comprehensive discussion of the known risks, side effects and potential benefits of treatment), the patient (family) agrees to implement the following specific plan:    Start Efudex 5% Cream, apply topically twice a day for 7 days   Apply Vaseline after applying Efudex cream    Cryotherapy performed in office  Consent obtained     Actinic keratoses are very common on sites repeatedly exposed to the sun, especially the backs of the hands and the face, most often affecting the ears, nose, cheeks, upper lip, vermilion of the lower lip, temples, forehead and balding scalp  In severely chronically sun-damaged individuals, they may also be found on the upper trunk, upper and lower limbs, and dorsum of feet  We discussed the theoretical premalignant (“pre-cancerous”) nature and etiology of these growths  We discussed the prevailing notion that actinic keratoses are a reflection of abnormal skin cell development due to DNA damage by short wavelength UVB  They are more likely to appear if the immune function is poor, due to aging, recent sun exposure, predisposing disease or certain drugs  We discussed that the main concern is that actinic keratoses may predispose to squamous cell carcinoma  It is rare for a solitary actinic keratosis to evolve to squamous cell carcinoma (SCC), but the risk of SCC occurring at some stage in a patient with more than 10 actinic keratoses is thought to be about 10 to 15%  A tender, thickened, ulcerated or enlarging actinic keratosis is suspicious of SCC  Actinic keratoses may be prevented by strict sun protection  If already present, keratoses may improve with a very high sun protection factor (50+) broad-spectrum sunscreen applied at least daily to affected areas, year-round    We recommend that UPF-rated clothing and hats and sunglasses be worn whenever possible and that a sunscreen-moisturizer combination product such as Neutrogena Daily Defense be applied at least three times a day  We performed a thorough discussion of treatment options and specific risk/benefits/alternatives including but not limited to medical “field” treatment with medications such as the following:    Topical “field area” medications such as 5-fluorouracil or Aldara (specifically, the trouble with long-term compliance, blistering and local skin reaction versus the convenience of at-home therapy and that field therapy “gets what is not yet seen”)  Cryotherapy (specifically, local pain, scarring, dyspigmentation, blistering, possible superinfection, and treats “only what we see” versus directed treatment today)  Photodynamic therapy (specifically, local pain, scarring, dyspigmentation, blistering, possible superinfection, need to schedule for a later date, and time spent in the office versus field therapy that “gets what is not yet seen”)

## 2023-01-01 ENCOUNTER — HOSPITAL ENCOUNTER (EMERGENCY)
Facility: HOSPITAL | Age: 73
Discharge: HOME | End: 2023-01-01
Attending: EMERGENCY MEDICINE

## 2023-01-01 ENCOUNTER — APPOINTMENT (EMERGENCY)
Dept: CT IMAGING | Facility: HOSPITAL | Age: 73
End: 2023-01-01

## 2023-01-01 VITALS
WEIGHT: 165 LBS | TEMPERATURE: 98.6 F | BODY MASS INDEX: 30.36 KG/M2 | OXYGEN SATURATION: 96 % | HEART RATE: 72 BPM | DIASTOLIC BLOOD PRESSURE: 69 MMHG | RESPIRATION RATE: 16 BRPM | HEIGHT: 62 IN | SYSTOLIC BLOOD PRESSURE: 150 MMHG

## 2023-01-01 DIAGNOSIS — G44.59 OTHER COMPLICATED HEADACHE SYNDROME: Primary | ICD-10-CM

## 2023-01-01 LAB
ANION GAP SERPL CALCULATED.3IONS-SCNC: 7 MMOL/L (ref 4–13)
BASOPHILS # BLD AUTO: 0.02 THOUSANDS/ÂΜL (ref 0–0.1)
BASOPHILS NFR BLD AUTO: 0 % (ref 0–1)
BUN SERPL-MCNC: 42 MG/DL (ref 5–25)
CALCIUM SERPL-MCNC: 9.1 MG/DL (ref 8.4–10.2)
CHLORIDE SERPL-SCNC: 104 MMOL/L (ref 96–108)
CO2 SERPL-SCNC: 27 MMOL/L (ref 21–32)
CREAT SERPL-MCNC: 1.99 MG/DL (ref 0.6–1.3)
CRP SERPL QL: 1.4 MG/L
EOSINOPHIL # BLD AUTO: 0.17 THOUSAND/ÂΜL (ref 0–0.61)
EOSINOPHIL NFR BLD AUTO: 2 % (ref 0–6)
ERYTHROCYTE [DISTWIDTH] IN BLOOD BY AUTOMATED COUNT: 15.3 % (ref 11.6–15.1)
ERYTHROCYTE [SEDIMENTATION RATE] IN BLOOD: 31 MM/HOUR (ref 0–29)
GFR SERPL CREATININE-BSD FRML MDRD: 24 ML/MIN/1.73SQ M
GLUCOSE SERPL-MCNC: 199 MG/DL (ref 65–140)
HCT VFR BLD AUTO: 41.6 % (ref 34.8–46.1)
HGB BLD-MCNC: 13.5 G/DL (ref 11.5–15.4)
IMM GRANULOCYTES # BLD AUTO: 0.04 THOUSAND/UL (ref 0–0.2)
IMM GRANULOCYTES NFR BLD AUTO: 0 % (ref 0–2)
LYMPHOCYTES # BLD AUTO: 2.01 THOUSANDS/ÂΜL (ref 0.6–4.47)
LYMPHOCYTES NFR BLD AUTO: 21 % (ref 14–44)
MCH RBC QN AUTO: 29 PG (ref 26.8–34.3)
MCHC RBC AUTO-ENTMCNC: 32.5 G/DL (ref 31.4–37.4)
MCV RBC AUTO: 90 FL (ref 82–98)
MONOCYTES # BLD AUTO: 0.56 THOUSAND/ÂΜL (ref 0.17–1.22)
MONOCYTES NFR BLD AUTO: 6 % (ref 4–12)
NEUTROPHILS # BLD AUTO: 6.74 THOUSANDS/ÂΜL (ref 1.85–7.62)
NEUTS SEG NFR BLD AUTO: 71 % (ref 43–75)
NRBC BLD AUTO-RTO: 0 /100 WBCS
PLATELET # BLD AUTO: 253 THOUSANDS/UL (ref 149–390)
PMV BLD AUTO: 11.1 FL (ref 8.9–12.7)
POTASSIUM SERPL-SCNC: 4.3 MMOL/L (ref 3.5–5.3)
RBC # BLD AUTO: 4.65 MILLION/UL (ref 3.81–5.12)
SODIUM SERPL-SCNC: 138 MMOL/L (ref 135–147)
WBC # BLD AUTO: 9.54 THOUSAND/UL (ref 4.31–10.16)

## 2023-01-01 RX ORDER — BUTALBITAL, ACETAMINOPHEN AND CAFFEINE 50; 325; 40 MG/1; MG/1; MG/1
1 TABLET ORAL EVERY 4 HOURS PRN
Qty: 15 TABLET | Refills: 0 | Status: SHIPPED | OUTPATIENT
Start: 2023-01-01 | End: 2023-01-07

## 2023-01-01 RX ORDER — FENTANYL CITRATE 50 UG/ML
50 INJECTION, SOLUTION INTRAMUSCULAR; INTRAVENOUS ONCE
Status: COMPLETED | OUTPATIENT
Start: 2023-01-01 | End: 2023-01-01

## 2023-01-01 RX ADMIN — FENTANYL CITRATE 50 MCG: 50 INJECTION INTRAMUSCULAR; INTRAVENOUS at 05:53

## 2023-01-01 NOTE — ED PROVIDER NOTES
History  Chief Complaint   Patient presents with   • Earache     Pt complains of sharp pain that feels like its inside of her left ear since Friday  Yolande Mccormick is a 67 y o  female who presents with the chief complaint of left sided temporal pain  Onset was Thursday  She reports the pain is temporarily relieved by acetaminophen and gabapentin but that these medications wear off  Nursing note indicates ear pain but the patient actually reports that if she puts pressure on her ear her pain is improved  No hearing complaints  Of note, patient started using fluorouracil cream the day before symptom onset  No pain with chewing  No visual complaints  No nausea or vomiting  No similar symptoms in the past  No fevers or chills  History provided by:  Patient   used: No    Headache  Pain location:  L temporal  Quality:  Sharp  Onset quality:  Gradual  Duration:  4 days  Timing:  Constant  Progression:  Unchanged  Chronicity:  New  Similar to prior headaches: no    Relieved by:  Acetaminophen, prescription medications and cold packs  Worsened by:  Nothing  Associated symptoms: no blurred vision, no diarrhea, no eye pain, no fever, no nausea, no paresthesias, no tingling, no visual change and no vomiting        Prior to Admission Medications   Prescriptions Last Dose Informant Patient Reported? Taking?    B-D ULTRAFINE III SHORT PEN 31G X 8 MM MISC   Yes No   Sig: USE AS DIRECTED 4 TIMES PER DAY   BIOTIN PO   Yes No   Sig: Take by mouth   Patient not taking: Reported on 9/23/2022   Biotin w/ Vitamins C & E (HAIR SKIN & NAILS GUMMIES PO)   Yes No   Sig: Take by mouth   Calcium Citrate 1040 MG TABS   Yes No   Sig: Take 500 mg by mouth Three times a day   DULoxetine (CYMBALTA) 30 mg delayed release capsule   Yes No   Sig: Take 30 mg by mouth 2 (two) times a day   Krill Oil (Omega-3) 500 MG CAPS   Yes No   Sig: Take 1,600 mg by mouth daily   Probiotic Product (PROBIOTIC PO)   Yes No   Sig: Take 1 capsule by mouth 2 (two) times a day   Vitamin D, Ergocalciferol, 2000 units CAPS   Yes No   Sig: Take 2,000 Units by mouth daily    acetaminophen (TYLENOL) 325 mg tablet   No No   Sig: Take 2 tablets (650 mg total) by mouth every 6 (six) hours as needed for mild pain, headaches or fever   albuterol (Ventolin HFA) 90 mcg/act inhaler   No No   Sig: Inhale 2 puffs every 6 (six) hours as needed for wheezing   amLODIPine (NORVASC) 5 mg tablet   No No   Sig: Take 1 tablet (5 mg total) by mouth daily   azelastine (ASTELIN) 0 1 % nasal spray   No No   Si spray into each nostril 2 (two) times a day Use in each nostril as directed   b complex vitamins capsule   Yes No   Sig: Take 1 capsule by mouth daily   calcium carbonate (TUMS) 500 mg chewable tablet   Yes No   Sig: Chew 1 tablet 2 (two) times a day   Patient not taking: Reported on 2022   estradiol (ESTRACE VAGINAL) 0 1 mg/g vaginal cream   No No   Sig: Insert 1 g into the vagina 3 (three) times a week   famotidine (PEPCID) 10 mg tablet   No No   Sig: Take 2 tablets (20 mg total) by mouth daily at bedtime   fluorouracil (EFUDEX) 5 % cream   No No   Sig: Apply topically twice a day for 7 days to the forehead   gabapentin (Neurontin) 300 mg capsule   No No   Sig: Take 1 capsule (300 mg total) by mouth daily at bedtime   insulin aspart (NovoLOG) 100 units/mL injection   Yes No   Sig: Inject under the skin 3 (three) times a day before meals 16 units, 16 units, 25 units     insulin detemir (Levemir FlexTouch) 100 Units/mL injection pen   Yes No   Sig: Inject 50 Units under the skin every evening    levothyroxine 112 mcg tablet   Yes No   Sig: Take 112 mcg by mouth every morning   metoprolol tartrate (LOPRESSOR) 50 mg tablet   No No   Sig: TAKE 1 TABLET (50 MG TOTAL) BY MOUTH EVERY 12 (TWELVE) HOURS   olmesartan (BENICAR) 20 mg tablet   No No   Sig: Take 1 tablet (20mg) by mouth in the morning daily   olmesartan (BENICAR) 5 mg tablet   No No   Sig: Take 2 tablets (10 mg total) by mouth daily   pantoprazole (PROTONIX) 40 mg tablet   No No   Sig: TAKE 1 TABLET BY MOUTH TWICE A DAY   pramipexole (MIRAPEX) 0 25 mg tablet   No No   Sig: Take 1 tablet (0 25 mg total) by mouth daily at bedtime   rosuvastatin (CRESTOR) 20 MG tablet   No No   Sig: Take 1 tablet (20 mg total) by mouth daily   sucralfate (CARAFATE) 1 g tablet   No No   Sig: Take 1 tablet (1 g total) by mouth 4 (four) times a day for 10 days Crush tablet and dissolve in 8oz water to make a sluree   Patient not taking: No sig reported   torsemide (DEMADEX) 20 mg tablet   No No   Sig: TAKE 1 TABLET BY MOUTH EVERY DAY      Facility-Administered Medications: None       Past Medical History:   Diagnosis Date   • Allergic    • Allergic rhinitis    • Anesthesia     pt reports "had to use double lumen endo tube for hiatal hernia repair/so surgeon could get to where he needed to work"   • Arthritis    • Aspiration into airway    • Basal cell carcinoma 2007    left cheek    • BCC (basal cell carcinoma) 05/27/2021    Left Nasal tip   • Cancer (HCC)     squamous cell cancer on forhead   • Cancer (HCC)     basal cell on nose    • Chronic kidney disease 2000, 2018    Stones, kidney disease stage 4   • Chronic pain disorder     bilat feet and joint pain on occas   • Colon polyp    • Constipation    • COPD (chronic obstructive pulmonary disease) ([facility] [address] )    • COVID-19 08/2021    recovered at home/did receive monoclonal infusion   • Dental bridge present    • Depression    • Diabetes mellitus ([facility] [address] )     Type 2   • Diabetic neuropathy ([facility] [address] )    • Disease of thyroid gland    • Family history of thyroid problem    • Fatty liver    • GERD (gastroesophageal reflux disease)    • Heart burn    • Hiatal hernia    • History of pneumonia    • Hyperlipidemia    • Hyperplasia, parathyroid (HCC)    • Hypertension    • Kidney problem    • Kidney stone    • Memory loss Julu2 2020   • Motion sickness    • Motion sickness    • Neck pain    • Obesity 1978   • Obesity (BMI 30 0-34  9)    • Pollen allergies    • RLS (restless legs syndrome)    • SCC (squamous cell carcinoma) 05/04/2021    left mid forehead   • Seasonal allergies    • Sleep apnea     has inspire   • Squamous cell skin cancer 2007    left cheek    • Swollen ankles    • 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, left     great toe fusion   • Urinary incontinence    • Urinary tract infection 03/28/2022   • Wears glasses        Past Surgical History:   Procedure Laterality Date   • ARTHROSCOPY KNEE     • BREAST BIOPSY      stereotactic-benign   • BREAST BIOPSY      stereo-benign   • BREAST EXCISIONAL BIOPSY      unknown date-benign   • BREAST EXCISIONAL BIOPSY      unknown date-benign   • BREAST EXCISIONAL BIOPSY      unknown date-benign   • BREAST EXCISIONAL BIOPSY      unknown age-benign   • CARDIAC CATHETERIZATION     • CHOLECYSTECTOMY     • COLONOSCOPY     • EXAMINATION UNDER ANESTHESIA N/A 06/24/2021    Procedure: EXAM UNDER ANESTHESIA (EUA), DISE;  Surgeon: Angelica Reyes MD;  Location: [facility];  Service: ENT   • HERNIA REPAIR     • HIATAL HERNIA REPAIR     • KNEE SURGERY      Torn maniscus lap surg   • LIPOSUCTION     • LYMPH NODE BIOPSY     • MOHS SURGERY  05/20/2021    left mid forehead-Gautam   • MOHS SURGERY Left 05/27/2021    Left nasal tip- gautam    • SD OPEN IMPLANTATION CRANIAL NERVE VASQUEZ & PULSE GEN N/A 11/10/2021    Procedure: INSERTION UPPER AIRWAY STIMULATOR, INSPIRE IMPLANT;  Surgeon: Angelica Reyes MD;  Location: [facility];  Service: ENT   • SD UNLISTED PROCEDURE FOOT/TOES Right 07/19/2022    Procedure: 1st metatarsal phalangeal joint fusion;  Surgeon: Delma Arceo DPM;  Location: [facility];  Service: Podiatry   • REDUCTION MAMMAPLASTY Bilateral 2000   • REDUCTION MAMMAPLASTY     • SKIN BIOPSY     • SKIN CANCER EXCISION  2007    squamous cell carcinoma    • SKIN CANCER EXCISION  2007    basal cell carcinoma   • SQUAMOUS CELL CARCINOMA EXCISION     • TOE SURGERY Right 08/04/2022 Right Great Toe Fusion   • TONSILLECTOMY     • TUBAL LIGATION     • UPPER GASTROINTESTINAL ENDOSCOPY     • US GUIDED BREAST BIOPSY RIGHT COMPLETE Right 2022       Family History   Problem Relation Age of Onset   • Heart disease Mother    • Depression Mother    • Hypertension Mother    • COPD Mother    • Hearing loss Mother    • Anxiety disorder Mother    • Heart disease Father    • Lung cancer Father 79        Smoker    • Cancer Father         brain   • Alcohol abuse Father    • Dementia Father    • Hypertension Father    • Thyroid disease Father    • COPD Father    • Arthritis Father    • Brain cancer Father 76   • Hypertension Sister    • Diabetes Sister    • Heart disease Sister    • Thyroid disease Sister    • Cancer Sister         Lympoma, lung   • Lung cancer Sister 78   • Anxiety disorder Sister    • Hypertension Brother    • Diabetes Brother    • Cancer Brother         Throat   • Dementia Brother    • Stroke Brother    • Hypertension Brother    • Heart disease Brother    • Diabetes Brother    • No Known Problems Son    • No Known Problems Son    • Brain cancer Paternal Aunt         unknown age   • Breast cancer Neg Hx      I have reviewed and agree with the history as documented  E-Cigarette/Vaping   • E-Cigarette Use Never User      E-Cigarette/Vaping Substances   • Nicotine No    • THC No    • CBD No    • Flavoring No    • Other No    • Unknown No      Social History     Tobacco Use   • Smoking status: Former     Packs/day: 2 00     Years: 10 00     Pack years: 20 00     Types: Cigarettes     Start date: 1968     Quit date: 1976     Years since quittin 0   • Smokeless tobacco: Never   • Tobacco comments:     Smoked 2 pack a day   Vaping Use   • Vaping Use: Never used   Substance Use Topics   • Alcohol use: Yes     Comment: 1 or 2 a year   • Drug use: No       Review of Systems   Constitutional: Negative for chills, diaphoresis and fever  Eyes: Negative for blurred vision and pain  Respiratory: Negative for shortness of breath  Cardiovascular: Negative for chest pain and palpitations  Gastrointestinal: Negative for diarrhea, nausea and vomiting  Genitourinary: Negative for dysuria and frequency  Skin: Negative for rash  Neurological: Positive for headaches  Negative for paresthesias  All other systems reviewed and are negative  Physical Exam  Physical Exam  Vitals and nursing note reviewed  Constitutional:       General: She is in acute distress  Appearance: She is well-developed  HENT:      Head: Normocephalic and atraumatic  Eyes:      Extraocular Movements: Extraocular movements intact  Pupils: Pupils are equal, round, and reactive to light  Neck:      Vascular: No JVD  Cardiovascular:      Rate and Rhythm: Normal rate and regular rhythm  Heart sounds: Normal heart sounds  No murmur heard  No friction rub  No gallop  Pulmonary:      Effort: Pulmonary effort is normal  No respiratory distress  Breath sounds: Normal breath sounds  No wheezing or rales  Chest:      Chest wall: No tenderness  Musculoskeletal:         General: No tenderness  Normal range of motion  Cervical back: Normal range of motion  Skin:     General: Skin is warm and dry  Neurological:      General: No focal deficit present  Mental Status: She is alert and oriented to person, place, and time  Psychiatric:         Behavior: Behavior normal          Thought Content:  Thought content normal          Judgment: Judgment normal          Vital Signs  ED Triage Vitals   Temperature Pulse Respirations Blood Pressure SpO2   01/01/23 0513 01/01/23 0509 01/01/23 0509 01/01/23 0509 01/01/23 0509   98 6 °F (37 °C) 74 16 (!) 212/95 95 %      Temp Source Heart Rate Source Patient Position - Orthostatic VS BP Location FiO2 (%)   01/01/23 0513 01/01/23 0509 01/01/23 0509 01/01/23 0509 --   Oral Monitor Sitting Right arm       Pain Score       01/01/23 0509       9 Vitals:    01/01/23 0509 01/01/23 0530   BP: (!) 212/95 150/69   Pulse: 74 72   Patient Position - Orthostatic VS: Sitting          Visual Acuity      ED Medications  Medications   fentanyl citrate (PF) 100 MCG/2ML 50 mcg (50 mcg Intravenous Given 1/1/23 0553)       Diagnostic Studies  Results Reviewed     Procedure Component Value Units Date/Time    Sedimentation rate, automated [[id number]]  (Abnormal) Collected: 01/01/23 0558    Lab Status: Final result Specimen: Blood from Arm, Right Updated: 01/01/23 0633     Sed Rate 31 mm/hour     Basic metabolic panel [[id number]]  (Abnormal) Collected: 01/01/23 0558    Lab Status: Final result Specimen: Blood from Arm, Right Updated: 01/01/23 0626     Sodium 138 mmol/L      Potassium 4 3 mmol/L      Chloride 104 mmol/L      CO2 27 mmol/L      ANION GAP 7 mmol/L      BUN 42 mg/dL      Creatinine 1 99 mg/dL      Glucose 199 mg/dL      Calcium 9 1 mg/dL      eGFR 24 ml/min/1 73sq m     Narrative:      [place] guidelines for Chronic Kidney Disease (CKD):   •  Stage 1 with normal or high GFR (GFR > 90 mL/min/1 73 square meters)  •  Stage 2 Mild CKD (GFR = 60-89 mL/min/1 73 square meters)  •  Stage 3A Moderate CKD (GFR = 45-59 mL/min/1 73 square meters)  •  Stage 3B Moderate CKD (GFR = 30-44 mL/min/1 73 square meters)  •  Stage 4 Severe CKD (GFR = 15-29 mL/min/1 73 square meters)  •  Stage 5 End Stage CKD (GFR <15 mL/min/1 73 square meters)  Note: GFR calculation is accurate only with a steady state creatinine    C-reactive protein [[id number]]  (Normal) Collected: 01/01/23 0558    Lab Status: Final result Specimen: Blood from Arm, Right Updated: 01/01/23 0626     CRP 1 4 mg/L     CBC and differential [[id number]]  (Abnormal) Collected: 01/01/23 0558    Lab Status: Final result Specimen: Blood from Arm, Right Updated: 01/01/23 0609     WBC 9 54 Thousand/uL      RBC 4 65 Million/uL      Hemoglobin 13 5 g/dL      Hematocrit 41 6 %      MCV 90 fL MCH 29 0 pg      MCHC 32 5 g/dL      RDW 15 3 %      MPV 11 1 fL      Platelets 528 Thousands/uL      nRBC 0 /100 WBCs      Neutrophils Relative 71 %      Immat GRANS % 0 %      Lymphocytes Relative 21 %      Monocytes Relative 6 %      Eosinophils Relative 2 %      Basophils Relative 0 %      Neutrophils Absolute 6 74 Thousands/µL      Immature Grans Absolute 0 04 Thousand/uL      Lymphocytes Absolute 2 01 Thousands/µL      Monocytes Absolute 0 56 Thousand/µL      Eosinophils Absolute 0 17 Thousand/µL      Basophils Absolute 0 02 Thousands/µL                  CT head without contrast   Final Result by Josh Booth MD (01/01 0703)      No acute intracranial abnormality  Stable chronic microangiopathic changes within the brain  Workstation performed: [id number]                    Procedures  Procedures         ED Course                                             Medical Decision Making  Background: 67 y o  female presents with acute left temporal headache    Differential DX includes but is not limited to: giant cell/temporal arteritis, migraine, side effect of fluorouracil cream, less likely neoplasm    Plan: esr, crp, cbc, metabolic panel, ct head, symptomatic treatment       Other complicated headache syndrome: acute illness or injury     Details: wide differential requiring extensive workup as detailed in the mdm narrative   Amount and/or Complexity of Data Reviewed  Labs: ordered  Decision-making details documented in ED Course  Radiology: ordered  Decision-making details documented in ED Course            Disposition  Final diagnoses:   Other complicated headache syndrome     Time reflects when diagnosis was documented in both MDM as applicable and the Disposition within this note     Time User Action Codes Description Comment    1/1/2023  7:47 AM Felicia TIPTON Add [T95 12] Other complicated headache syndrome       ED Disposition     ED Disposition   Discharge    Condition   Stable Date/Time   Sun Jan 1, 2023  7:47 AM    Comment   Angelo Moreira discharge to home/self care  Follow-up Information     Follow up With Specialties Details Why Contact Info    Jarrod Mathew MD Family Medicine Schedule an appointment as soon as possible for a visit in 1 week  [address]  [address]  [place] [address]  [phone]            Patient's Medications   Discharge Prescriptions    BUTALBITAL-ACETAMINOPHEN-CAFFEINE (FIORICET,ESGIC) -40 MG PER TABLET    Take 1 tablet by mouth every 4 (four) hours as needed for headaches for up to 10 days       Start Date: 1/1/2023  End Date: 1/11/2023       Order Dose: 1 tablet       Quantity: 15 tablet    Refills: 0       No discharge procedures on file      PDMP Review       Value Time User    PDMP Reviewed  Yes 11/10/2021  3:47 PM Oscar Choi MD          ED Provider  Electronically Signed by           Agapito Alvarado MD  01/01/23 0378

## 2023-01-03 ENCOUNTER — APPOINTMENT (EMERGENCY)
Dept: CT IMAGING | Facility: HOSPITAL | Age: 73
End: 2023-01-03

## 2023-01-03 ENCOUNTER — HOSPITAL ENCOUNTER (INPATIENT)
Facility: HOSPITAL | Age: 73
LOS: 3 days | Discharge: HOME | End: 2023-01-07
Attending: EMERGENCY MEDICINE | Admitting: INTERNAL MEDICINE

## 2023-01-03 DIAGNOSIS — R41.82 AMS (ALTERED MENTAL STATUS): Primary | ICD-10-CM

## 2023-01-03 DIAGNOSIS — R51.9 HEADACHE: ICD-10-CM

## 2023-01-03 DIAGNOSIS — G25.81 RESTLESS LEG SYNDROME: ICD-10-CM

## 2023-01-03 DIAGNOSIS — N39.0 UTI (URINARY TRACT INFECTION): ICD-10-CM

## 2023-01-03 DIAGNOSIS — B02.9 HERPES ZOSTER WITHOUT COMPLICATION: ICD-10-CM

## 2023-01-03 DIAGNOSIS — R53.1 GENERALIZED WEAKNESS: ICD-10-CM

## 2023-01-03 DIAGNOSIS — E03.9 HYPOTHYROIDISM, UNSPECIFIED TYPE: ICD-10-CM

## 2023-01-03 DIAGNOSIS — N17.9 AKI (ACUTE KIDNEY INJURY) (HCC): ICD-10-CM

## 2023-01-03 LAB
2HR DELTA HS TROPONIN: 0 NG/L
ALBUMIN SERPL BCP-MCNC: 4.3 G/DL (ref 3.5–5)
ALP SERPL-CCNC: 75 U/L (ref 34–104)
ALT SERPL W P-5'-P-CCNC: 140 U/L (ref 7–52)
ANION GAP SERPL CALCULATED.3IONS-SCNC: 10 MMOL/L (ref 4–13)
AST SERPL W P-5'-P-CCNC: 64 U/L (ref 13–39)
ATRIAL RATE: 66 BPM
BACTERIA UR QL AUTO: ABNORMAL /HPF
BASOPHILS # BLD AUTO: 0.05 THOUSANDS/ÂΜL (ref 0–0.1)
BASOPHILS NFR BLD AUTO: 0 % (ref 0–1)
BILIRUB SERPL-MCNC: 0.49 MG/DL (ref 0.2–1)
BILIRUB UR QL STRIP: NEGATIVE
BUN SERPL-MCNC: 40 MG/DL (ref 5–25)
CALCIUM SERPL-MCNC: 10.6 MG/DL (ref 8.4–10.2)
CARDIAC TROPONIN I PNL SERPL HS: 5 NG/L
CARDIAC TROPONIN I PNL SERPL HS: 5 NG/L
CHLORIDE SERPL-SCNC: 100 MMOL/L (ref 96–108)
CLARITY UR: CLEAR
CO2 SERPL-SCNC: 29 MMOL/L (ref 21–32)
COLOR UR: YELLOW
CREAT SERPL-MCNC: 1.5 MG/DL (ref 0.6–1.3)
CRP SERPL QL: 3.3 MG/L
EOSINOPHIL # BLD AUTO: 0.21 THOUSAND/ÂΜL (ref 0–0.61)
EOSINOPHIL NFR BLD AUTO: 2 % (ref 0–6)
ERYTHROCYTE [DISTWIDTH] IN BLOOD BY AUTOMATED COUNT: 15.3 % (ref 11.6–15.1)
ERYTHROCYTE [SEDIMENTATION RATE] IN BLOOD: 56 MM/HOUR (ref 0–29)
FLUAV RNA RESP QL NAA+PROBE: NEGATIVE
FLUBV RNA RESP QL NAA+PROBE: NEGATIVE
GFR SERPL CREATININE-BSD FRML MDRD: 34 ML/MIN/1.73SQ M
GLUCOSE SERPL-MCNC: 211 MG/DL (ref 65–140)
GLUCOSE SERPL-MCNC: 78 MG/DL (ref 65–140)
GLUCOSE UR STRIP-MCNC: NEGATIVE MG/DL
HCT VFR BLD AUTO: 44.7 % (ref 34.8–46.1)
HGB BLD-MCNC: 14.9 G/DL (ref 11.5–15.4)
HGB UR QL STRIP.AUTO: NEGATIVE
IMM GRANULOCYTES # BLD AUTO: 0.04 THOUSAND/UL (ref 0–0.2)
IMM GRANULOCYTES NFR BLD AUTO: 0 % (ref 0–2)
KETONES UR STRIP-MCNC: NEGATIVE MG/DL
LACTATE SERPL-SCNC: 0.7 MMOL/L (ref 0.5–2)
LEUKOCYTE ESTERASE UR QL STRIP: NEGATIVE
LYMPHOCYTES # BLD AUTO: 2.09 THOUSANDS/ÂΜL (ref 0.6–4.47)
LYMPHOCYTES NFR BLD AUTO: 17 % (ref 14–44)
MCH RBC QN AUTO: 28.7 PG (ref 26.8–34.3)
MCHC RBC AUTO-ENTMCNC: 33.3 G/DL (ref 31.4–37.4)
MCV RBC AUTO: 86 FL (ref 82–98)
MONOCYTES # BLD AUTO: 0.89 THOUSAND/ÂΜL (ref 0.17–1.22)
MONOCYTES NFR BLD AUTO: 7 % (ref 4–12)
NEUTROPHILS # BLD AUTO: 8.78 THOUSANDS/ÂΜL (ref 1.85–7.62)
NEUTS SEG NFR BLD AUTO: 74 % (ref 43–75)
NITRITE UR QL STRIP: POSITIVE
NON-SQ EPI CELLS URNS QL MICRO: ABNORMAL /HPF
NRBC BLD AUTO-RTO: 0 /100 WBCS
P AXIS: 28 DEGREES
PH UR STRIP.AUTO: 6 [PH]
PLATELET # BLD AUTO: 274 THOUSANDS/UL (ref 149–390)
PMV BLD AUTO: 10.7 FL (ref 8.9–12.7)
POTASSIUM SERPL-SCNC: 4 MMOL/L (ref 3.5–5.3)
PR INTERVAL: 188 MS
PROT SERPL-MCNC: 7.5 G/DL (ref 6.4–8.4)
PROT UR STRIP-MCNC: ABNORMAL MG/DL
QRS AXIS: -15 DEGREES
QRSD INTERVAL: 84 MS
QT INTERVAL: 396 MS
QTC INTERVAL: 415 MS
RBC # BLD AUTO: 5.19 MILLION/UL (ref 3.81–5.12)
RBC #/AREA URNS AUTO: ABNORMAL /HPF
RSV RNA RESP QL NAA+PROBE: NEGATIVE
SARS-COV-2 RNA RESP QL NAA+PROBE: NEGATIVE
SODIUM SERPL-SCNC: 139 MMOL/L (ref 135–147)
SP GR UR STRIP.AUTO: 1.02 (ref 1–1.03)
T WAVE AXIS: 21 DEGREES
UROBILINOGEN UR STRIP-ACNC: <2 MG/DL
VENTRICULAR RATE: 66 BPM
WBC # BLD AUTO: 12.06 THOUSAND/UL (ref 4.31–10.16)
WBC #/AREA URNS AUTO: ABNORMAL /HPF

## 2023-01-03 RX ORDER — LEVOTHYROXINE SODIUM 112 UG/1
112 TABLET ORAL
Status: DISCONTINUED | OUTPATIENT
Start: 2023-01-04 | End: 2023-01-07 | Stop reason: HOSPADM

## 2023-01-03 RX ORDER — FLUCONAZOLE 100 MG/1
200 TABLET ORAL ONCE
Status: COMPLETED | OUTPATIENT
Start: 2023-01-03 | End: 2023-01-03

## 2023-01-03 RX ORDER — ESTRADIOL 0.1 MG/G
1 CREAM VAGINAL 3 TIMES WEEKLY
Status: DISCONTINUED | OUTPATIENT
Start: 2023-01-04 | End: 2023-01-07 | Stop reason: HOSPADM

## 2023-01-03 RX ORDER — METOPROLOL TARTRATE 50 MG/1
50 TABLET, FILM COATED ORAL EVERY 12 HOURS SCHEDULED
Status: DISCONTINUED | OUTPATIENT
Start: 2023-01-03 | End: 2023-01-07 | Stop reason: HOSPADM

## 2023-01-03 RX ORDER — INSULIN LISPRO 100 [IU]/ML
12 INJECTION, SOLUTION INTRAVENOUS; SUBCUTANEOUS
Status: DISCONTINUED | OUTPATIENT
Start: 2023-01-04 | End: 2023-01-04

## 2023-01-03 RX ORDER — ACETAMINOPHEN 325 MG/1
650 TABLET ORAL ONCE
Status: COMPLETED | OUTPATIENT
Start: 2023-01-03 | End: 2023-01-03

## 2023-01-03 RX ORDER — AMLODIPINE BESYLATE 5 MG/1
5 TABLET ORAL DAILY
Status: DISCONTINUED | OUTPATIENT
Start: 2023-01-04 | End: 2023-01-07 | Stop reason: HOSPADM

## 2023-01-03 RX ORDER — ALBUTEROL SULFATE 90 UG/1
2 AEROSOL, METERED RESPIRATORY (INHALATION) EVERY 6 HOURS PRN
Status: DISCONTINUED | OUTPATIENT
Start: 2023-01-03 | End: 2023-01-07 | Stop reason: HOSPADM

## 2023-01-03 RX ORDER — LOSARTAN POTASSIUM 25 MG/1
25 TABLET ORAL DAILY
Status: DISCONTINUED | OUTPATIENT
Start: 2023-01-04 | End: 2023-01-07 | Stop reason: HOSPADM

## 2023-01-03 RX ORDER — SACCHAROMYCES BOULARDII 250 MG
250 CAPSULE ORAL 2 TIMES DAILY
Status: DISCONTINUED | OUTPATIENT
Start: 2023-01-03 | End: 2023-01-07 | Stop reason: HOSPADM

## 2023-01-03 RX ORDER — FLUCONAZOLE 40 MG/ML
200 POWDER, FOR SUSPENSION ORAL ONCE
Status: DISCONTINUED | OUTPATIENT
Start: 2023-01-03 | End: 2023-01-03

## 2023-01-03 RX ORDER — GABAPENTIN 300 MG/1
300 CAPSULE ORAL
Status: DISCONTINUED | OUTPATIENT
Start: 2023-01-03 | End: 2023-01-06

## 2023-01-03 RX ORDER — DULOXETIN HYDROCHLORIDE 60 MG/1
60 CAPSULE, DELAYED RELEASE ORAL DAILY
Status: DISCONTINUED | OUTPATIENT
Start: 2023-01-04 | End: 2023-01-07 | Stop reason: HOSPADM

## 2023-01-03 RX ORDER — FAMOTIDINE 20 MG/1
20 TABLET, FILM COATED ORAL
Status: DISCONTINUED | OUTPATIENT
Start: 2023-01-03 | End: 2023-01-07 | Stop reason: HOSPADM

## 2023-01-03 RX ORDER — PANTOPRAZOLE SODIUM 40 MG/1
40 TABLET, DELAYED RELEASE ORAL 2 TIMES DAILY
Status: DISCONTINUED | OUTPATIENT
Start: 2023-01-03 | End: 2023-01-07 | Stop reason: HOSPADM

## 2023-01-03 RX ORDER — TORSEMIDE 20 MG/1
20 TABLET ORAL DAILY
Status: DISCONTINUED | OUTPATIENT
Start: 2023-01-04 | End: 2023-01-07 | Stop reason: HOSPADM

## 2023-01-03 RX ORDER — LOSARTAN POTASSIUM 50 MG/1
50 TABLET ORAL DAILY
Status: DISCONTINUED | OUTPATIENT
Start: 2023-01-04 | End: 2023-01-07 | Stop reason: HOSPADM

## 2023-01-03 RX ORDER — CEFEPIME HYDROCHLORIDE 1 G/50ML
1000 INJECTION, SOLUTION INTRAVENOUS EVERY 12 HOURS
Status: DISCONTINUED | OUTPATIENT
Start: 2023-01-03 | End: 2023-01-06

## 2023-01-03 RX ORDER — INSULIN LISPRO 100 [IU]/ML
1-5 INJECTION, SOLUTION INTRAVENOUS; SUBCUTANEOUS
Status: DISCONTINUED | OUTPATIENT
Start: 2023-01-04 | End: 2023-01-07 | Stop reason: HOSPADM

## 2023-01-03 RX ORDER — PREDNISONE 20 MG/1
60 TABLET ORAL ONCE
Status: COMPLETED | OUTPATIENT
Start: 2023-01-03 | End: 2023-01-03

## 2023-01-03 RX ORDER — ACETAMINOPHEN 325 MG/1
650 TABLET ORAL EVERY 6 HOURS PRN
Status: DISCONTINUED | OUTPATIENT
Start: 2023-01-03 | End: 2023-01-07 | Stop reason: HOSPADM

## 2023-01-03 RX ORDER — CALCIUM CARBONATE 500(1250)
1 TABLET ORAL
Status: DISCONTINUED | OUTPATIENT
Start: 2023-01-04 | End: 2023-01-07 | Stop reason: HOSPADM

## 2023-01-03 RX ORDER — ATORVASTATIN CALCIUM 40 MG/1
40 TABLET, FILM COATED ORAL
Status: DISCONTINUED | OUTPATIENT
Start: 2023-01-04 | End: 2023-01-07 | Stop reason: HOSPADM

## 2023-01-03 RX ORDER — ENOXAPARIN SODIUM 100 MG/ML
30 INJECTION SUBCUTANEOUS DAILY
Status: DISCONTINUED | OUTPATIENT
Start: 2023-01-04 | End: 2023-01-07 | Stop reason: HOSPADM

## 2023-01-03 RX ORDER — INSULIN LISPRO 100 [IU]/ML
20 INJECTION, SOLUTION INTRAVENOUS; SUBCUTANEOUS
Status: DISCONTINUED | OUTPATIENT
Start: 2023-01-04 | End: 2023-01-04

## 2023-01-03 RX ORDER — FLUOROURACIL 50 MG/G
CREAM TOPICAL 2 TIMES DAILY
Status: DISCONTINUED | OUTPATIENT
Start: 2023-01-03 | End: 2023-01-04 | Stop reason: RX

## 2023-01-03 RX ORDER — PRAMIPEXOLE DIHYDROCHLORIDE 0.25 MG/1
0.25 TABLET ORAL
Status: DISCONTINUED | OUTPATIENT
Start: 2023-01-03 | End: 2023-01-07 | Stop reason: HOSPADM

## 2023-01-03 RX ORDER — OMEGA-3S/DHA/EPA/FISH OIL/D3 300MG-1000
400 CAPSULE ORAL DAILY
Status: DISCONTINUED | OUTPATIENT
Start: 2023-01-04 | End: 2023-01-07 | Stop reason: HOSPADM

## 2023-01-03 RX ADMIN — CEFEPIME HYDROCHLORIDE 1000 MG: 1 INJECTION, SOLUTION INTRAVENOUS at 22:23

## 2023-01-03 RX ADMIN — SODIUM CHLORIDE, SODIUM LACTATE, POTASSIUM CHLORIDE, AND CALCIUM CHLORIDE 500 ML: .6; .31; .03; .02 INJECTION, SOLUTION INTRAVENOUS at 10:43

## 2023-01-03 RX ADMIN — FLUCONAZOLE 200 MG: 100 TABLET ORAL at 09:39

## 2023-01-03 RX ADMIN — PREDNISONE 60 MG: 20 TABLET ORAL at 09:39

## 2023-01-03 RX ADMIN — PRAMIPEXOLE DIHYDROCHLORIDE 0.25 MG: 0.25 TABLET ORAL at 22:21

## 2023-01-03 RX ADMIN — PANTOPRAZOLE SODIUM 40 MG: 40 TABLET, DELAYED RELEASE ORAL at 21:26

## 2023-01-03 RX ADMIN — ACETAMINOPHEN 650 MG: 325 TABLET, FILM COATED ORAL at 08:18

## 2023-01-03 RX ADMIN — ACETAMINOPHEN 650 MG: 325 TABLET, FILM COATED ORAL at 21:26

## 2023-01-03 RX ADMIN — FAMOTIDINE 20 MG: 20 TABLET, FILM COATED ORAL at 21:26

## 2023-01-03 RX ADMIN — CEFTRIAXONE SODIUM 1000 MG: 10 INJECTION, POWDER, FOR SOLUTION INTRAVENOUS at 15:06

## 2023-01-03 RX ADMIN — METOPROLOL TARTRATE 50 MG: 50 TABLET, FILM COATED ORAL at 21:26

## 2023-01-03 RX ADMIN — Medication 250 MG: at 22:22

## 2023-01-03 RX ADMIN — INSULIN DETEMIR 40 UNITS: 100 INJECTION, SOLUTION SUBCUTANEOUS at 22:21

## 2023-01-03 RX ADMIN — SODIUM CHLORIDE, SODIUM LACTATE, POTASSIUM CHLORIDE, AND CALCIUM CHLORIDE 500 ML: .6; .31; .03; .02 INJECTION, SOLUTION INTRAVENOUS at 08:16

## 2023-01-03 RX ADMIN — GABAPENTIN 300 MG: 300 CAPSULE ORAL at 21:26

## 2023-01-03 NOTE — ASSESSMENT & PLAN NOTE
66 y/o female with arthritis, CKD, COPD, depression, DM, neuropathy, hypothyroidism, GERD, HTN, HLD, RLS, who presents with left sided headache x 5 days and new onset confusion on 1/3/23  There was concern for possible GCA, therefore, patient was started on steroids and underwent temporal artery biopsy  Over night 1/5-1/6, she developed a rash on the left face/eye that is concerning for herpes zoster  Plan:  - S/p Solumedrol; dc'd 1/6  - S/p temporal artery biopsy  - Started on valcyclovir 1000 mg daily (adjusted for renal function) per primary team  - S/p Tylenol 650 mg in ED x 1  - S/p Prednisone 60 mg in ED x 1  - S/P ophthalmology examination who 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 and recommended that the patient be started on Pred forte eye drops 3 times a day for 5 days and to then follow up with them allergy as an outpatient  - Monitor neuro exam; notify with any changes  - Medical management and supportive care per primary team  Correction of any metabolic or infectious disturbances  Results:  - CT head: No acute intracranial abnormality  Mild microangiopathic changes are similar to the prior study  - Temporal artery biopsy:   · RIGHT SIDE:  There is no evidence of giant cell arteritis, aneurysm, or significant stenosis noted in the superficial temporal artery and its branches  · LEFT SIDE:  There is no evidence of giant cell arteritis, aneurysm, or significant stenosis noted in the superficial temporal artery and its branches    - 1/1 Labs: ESR 31, CRP 1 4  - 1/3 Labs: WBC 12 06, UA concerning for UTI, AST 64, , ESR 56, COVID/flu/RSV negative

## 2023-01-03 NOTE — ASSESSMENT & PLAN NOTE
Patient with suprapubic pain and UA showing normal bacteria and 4-10 WBCs  Patient has history of UTI with Enterobacter cloacae  Patient also has mildly elevated WBC at 12 and some confusion oriented to person only  Given suprapubic tenderness and leukocytosis with positive UA, will treat for suspected UTI  Previous cultures show susceptibility to cefepime      Plan:  · Cefepime 1 g every 12 hours  · Follow-up urine culture

## 2023-01-03 NOTE — ASSESSMENT & PLAN NOTE
- UA concerning for UTI; urine culture positive for E  coli  - Blood cultures NGTD  - S/p ceftriaxone initially and subsequently cefepime  - Medical management per primary team

## 2023-01-03 NOTE — ED PROVIDER NOTES
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, COPD hypertension, diabetes presents for evaluation of a left-sided headache that started 2 days ago  Patient reports she presented to this ED 2 days ago at which point work-up was benign and was sent home with Rx Fioricet however she reports this medication has not helped her headaches  This morning she felt profusely dizzy and fell while in the bathroom with head strike to the left side of her head without LOC and was able to get back up without issues  Her  who lives with her at home with her son reports that she has been generally weak and severely fatigued, sleeping more than usual the past couple of days  No vision changes, jaw pain with chewing, myalgias, new arthritic pain, N/V/D, cough/cold symptoms, fever, chest pain, Willie pain dysuria or any other symptoms  No other concerns  Prior to Admission Medications   Prescriptions Last Dose Informant Patient Reported? Taking?    B-D ULTRAFINE III SHORT PEN 31G X 8 MM MISC 1/2/2023  Yes Yes   Sig: USE AS DIRECTED 4 TIMES PER DAY   BIOTIN PO Not Taking  Yes No   Sig: Take by mouth   Patient not taking: Reported on 9/23/2022   Biotin w/ Vitamins C & E (HAIR SKIN & NAILS GUMMIES PO) 1/2/2023  Yes Yes   Sig: Take by mouth   Calcium Citrate 1040 MG TABS 1/2/2023  Yes Yes   Sig: Take 500 mg by mouth Three times a day   DULoxetine (CYMBALTA) 30 mg delayed release capsule 1/2/2023  Yes Yes   Sig: Take 60 mg by mouth daily   Krill Oil (Omega-3) 500 MG CAPS Not Taking  Yes No   Sig: Take 1,600 mg by mouth daily   Patient not taking: Reported on 1/3/2023   Probiotic Product (PROBIOTIC PO) 1/2/2023  Yes Yes   Sig: Take 1 capsule by mouth 2 (two) times a day   Vitamin D, Ergocalciferol, 2000 units CAPS 2023  Yes Yes   Sig: Take 2,000 Units by mouth daily    acetaminophen (TYLENOL) 325 mg tablet 1/3/2023  No Yes   Sig: Take 2 tablets (650 mg total) by mouth every 6 (six) hours as needed for mild pain, headaches or fever   albuterol (Ventolin HFA) 90 mcg/act inhaler Past Month  No Yes   Sig: Inhale 2 puffs every 6 (six) hours as needed for wheezing   amLODIPine (NORVASC) 5 mg tablet 2023  No Yes   Sig: Take 1 tablet (5 mg total) by mouth daily   azelastine (ASTELIN) 0 1 % nasal spray   No No   Si spray into each nostril 2 (two) times a day Use in each nostril as directed   b complex vitamins capsule 2023  Yes Yes   Sig: Take 1 capsule by mouth daily   butalbital-acetaminophen-caffeine (FIORICET,ESGIC) -40 mg per tablet Past Week  No Yes   Sig: Take 1 tablet by mouth every 4 (four) hours as needed for headaches for up to 10 days   calcium carbonate (TUMS) 500 mg chewable tablet Not Taking  Yes No   Sig: Chew 1 tablet 2 (two) times a day   Patient not taking: Reported on 2022   estradiol (ESTRACE VAGINAL) 0 1 mg/g vaginal cream 2023  No Yes   Sig: Insert 1 g into the vagina 3 (three) times a week   famotidine (PEPCID) 10 mg tablet 2023  No Yes   Sig: Take 2 tablets (20 mg total) by mouth daily at bedtime   fluorouracil (EFUDEX) 5 % cream 2023  No Yes   Sig: Apply topically twice a day for 7 days to the forehead   gabapentin (Neurontin) 300 mg capsule 2023  No Yes   Sig: Take 1 capsule (300 mg total) by mouth daily at bedtime   insulin aspart (NovoLOG) 100 units/mL injection 2023  Yes Yes   Sig: Inject under the skin 3 (three) times a day before meals 16 units, 16 units, 25 units     insulin detemir (Levemir FlexTouch) 100 Units/mL injection pen 2023  Yes Yes   Sig: Inject 50 Units under the skin every evening    levothyroxine 112 mcg tablet 2023  Yes Yes   Sig: Take 112 mcg by mouth every morning   metoprolol tartrate (LOPRESSOR) 50 mg tablet 1/2/2023  No Yes   Sig: TAKE 1 TABLET (50 MG TOTAL) BY MOUTH EVERY 12 (TWELVE) HOURS   olmesartan (BENICAR) 20 mg tablet 1/2/2023  No Yes   Sig: Take 1 tablet (20mg) by mouth in the morning daily   olmesartan (BENICAR) 5 mg tablet 1/2/2023  No Yes   Sig: Take 2 tablets (10 mg total) by mouth daily   pantoprazole (PROTONIX) 40 mg tablet 1/2/2023  No Yes   Sig: TAKE 1 TABLET BY MOUTH TWICE A DAY   pramipexole (MIRAPEX) 0 25 mg tablet 1/2/2023  No Yes   Sig: Take 1 tablet (0 25 mg total) by mouth daily at bedtime   rosuvastatin (CRESTOR) 20 MG tablet   No No   Sig: Take 1 tablet (20 mg total) by mouth daily   sucralfate (CARAFATE) 1 g tablet   No No   Sig: Take 1 tablet (1 g total) by mouth 4 (four) times a day for 10 days Crush tablet and dissolve in [facility] water to make a sluree   Patient not taking: No sig reported   torsemide (DEMADEX) 20 mg tablet 1/2/2023  No Yes   Sig: TAKE 1 TABLET BY MOUTH EVERY DAY      Facility-Administered Medications: None       Past Medical History:   Diagnosis Date   • Allergic    • Allergic rhinitis    • Anesthesia     pt reports "had to use double lumen endo tube for hiatal hernia repair/so surgeon could get to where he needed to work"   • Arthritis    • Aspiration into airway    • Basal cell carcinoma 2007    left cheek    • BCC (basal cell carcinoma) 05/27/2021    Left Nasal tip   • Cancer ([facility] [address] )     squamous cell cancer on forhead   • Cancer (HCC)     basal cell on nose    • Chronic kidney disease 2000, 2018    Stones, kidney disease stage 4   • Chronic pain disorder     bilat feet and joint pain on occas   • Colon polyp    • Constipation    • COPD (chronic obstructive pulmonary disease) ([facility] [place] [address] )    • COVID-19 08/2021    recovered at home/did receive monoclonal infusion   • Dental bridge present    • Depression    • Diabetes mellitus ([facility] [address] )     Type 2   • [facility]    • Disease of thyroid gland    • Family history of thyroid problem    • Fatty liver    • GERD (gastroesophageal reflux disease)    • Heart burn    • Hiatal hernia    • History of pneumonia    • Hyperlipidemia    • Hyperplasia, parathyroid ([address] )    • Hypertension    • Kidney problem    • Kidney stone    • Memory loss Julu2 2020   • Motion sickness    • Motion sickness    • Neck pain    • Obesity 1978   • Obesity (BMI 30 0-34  9)    • Pollen allergies    • RLS (restless legs syndrome)    • SCC (squamous cell carcinoma) 05/04/2021    left mid forehead   • Seasonal allergies    • Sleep apnea     has inspire   • Squamous cell skin cancer 2007    left cheek    • Swollen ankles    • 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, left     great toe fusion   • Urinary incontinence    • Urinary tract infection 03/28/2022   • Wears glasses        Past Surgical History:   Procedure Laterality Date   • ARTHROSCOPY KNEE     • BREAST BIOPSY      stereotactic-benign   • BREAST BIOPSY      stereo-benign   • BREAST EXCISIONAL BIOPSY      unknown date-benign   • BREAST EXCISIONAL BIOPSY      unknown date-benign   • BREAST EXCISIONAL BIOPSY      unknown date-benign   • BREAST EXCISIONAL BIOPSY      unknown age-benign   • CARDIAC CATHETERIZATION     • CHOLECYSTECTOMY     • COLONOSCOPY     • EXAMINATION UNDER ANESTHESIA N/A 06/24/2021    Procedure: EXAM UNDER ANESTHESIA (EUA), DISE;  Surgeon: Frank Pappas MD;  Location[facility] MAIN OR;  Service: ENT   • HERNIA REPAIR     • HIATAL HERNIA REPAIR     • KNEE SURGERY      Torn maniscus lap surg   • LIPOSUCTION     • LYMPH NODE BIOPSY     • MOHS SURGERY  05/20/2021    left mid forehead-Gautam   • MOHS SURGERY Left 05/27/2021    Left nasal tip- gautam    • UT OPEN IMPLANTATION CRANIAL NERVE VASQUEZ & PULSE GEN N/A 11/10/2021    Procedure: INSERTION UPPER AIRWAY STIMULATOR, INSPIRE IMPLANT;  Surgeon: Frank Pappas MD;  Location: [facility];  Service: ENT   • UT UNLISTED PROCEDURE FOOT/TOES Right 07/19/2022    Procedure: 1st metatarsal phalangeal joint fusion;  Surgeon: Woody Vidal DPM;  Location[facility];  Service: Podiatry   • REDUCTION MAMMAPLASTY Bilateral 2000   • REDUCTION MAMMAPLASTY     • SKIN BIOPSY     • SKIN CANCER EXCISION  2007    squamous cell carcinoma    • SKIN CANCER EXCISION  2007    basal cell carcinoma   • SQUAMOUS CELL CARCINOMA EXCISION     • TOE SURGERY Right 08/04/2022    Right Great Toe Fusion   • TONSILLECTOMY     • TUBAL LIGATION     • UPPER GASTROINTESTINAL ENDOSCOPY     • US GUIDED BREAST BIOPSY RIGHT COMPLETE Right 07/18/2022       Family History   Problem Relation Age of Onset   • Heart disease Mother    • Depression Mother    • Hypertension Mother    • COPD Mother    • Hearing loss Mother    • Anxiety disorder Mother    • Heart disease Father    • Lung cancer Father 79        Smoker    • Cancer Father         brain   • Alcohol abuse Father    • Dementia Father    • Hypertension Father    • Thyroid disease Father    • COPD Father    • Arthritis Father    • Brain cancer Father 76   • Hypertension Sister    • Diabetes Sister    • Heart disease Sister    • Thyroid disease Sister    • Cancer Sister         Lympoma, lung   • Lung cancer Sister 78   • Anxiety disorder Sister    • Hypertension Brother    • Diabetes Brother    • Cancer Brother         Throat   • Dementia Brother    • Stroke Brother    • Hypertension Brother    • Heart disease Brother    • Diabetes Brother    • No Known Problems Son    • No Known Problems Son    • Brain cancer Paternal Aunt         unknown age   • Breast cancer Neg Hx      I have reviewed and agree with the history as documented      E-Cigarette/Vaping   • E-Cigarette Use Never User      E-Cigarette/Vaping Substances   • Nicotine No    • THC No    • CBD No    • Flavoring No    • Other No    • Unknown No      Social History     Tobacco Use   • Smoking status: Former     Packs/day: 2 00     Years: 10 00     Pack years: 20 00     Types: Cigarettes     Start date: 1/1/1968     Quit date: 1/1/1976 Years since quittin 0   • Smokeless tobacco: Never   • Tobacco comments:     Smoked 2 pack a day   Vaping Use   • Vaping Use: Never used   Substance Use Topics   • Alcohol use: Yes     Comment: 1 or 2 a year   • Drug use: No        Review of Systems   Constitutional: Positive for activity change and fatigue  Negative for chills and fever  HENT: Negative for ear pain and sore throat  Eyes: Negative for pain and visual disturbance  Respiratory: Negative for cough and shortness of breath  Cardiovascular: Negative for chest pain and palpitations  Gastrointestinal: Negative for abdominal pain and vomiting  Genitourinary: Negative for dysuria and hematuria  Musculoskeletal: Negative for arthralgias and back pain  Skin: Negative for color change and rash  Neurological: Positive for dizziness and headaches  Negative for seizures and syncope  All other systems reviewed and are negative  Physical Exam  ED Triage Vitals   Temperature Pulse Respirations Blood Pressure SpO2   23 0440 23 0440 23 0440 23 0440 23 0440   97 9 °F (36 6 °C) 65 16 170/87 94 %      Temp Source Heart Rate Source Patient Position - Orthostatic VS BP Location FiO2 (%)   23 0440 23 0440 23 0440 23 0440 --   Oral Monitor Sitting Right arm       Pain Score       23 0818       2             Orthostatic Vital Signs  Vitals:    23 1100 23 1230 23 1300 23 1330   BP: 155/65 150/68 159/80 162/77   Pulse: 60 67 67 68   Patient Position - Orthostatic VS: Sitting          Physical Exam  Vitals and nursing note reviewed  Constitutional:       General: She is not in acute distress  Appearance: She is well-developed  She is ill-appearing  HENT:      Head: Normocephalic and atraumatic        Right Ear: External ear normal       Left Ear: External ear normal       Ears:      Comments: Mild redness to the left ear canal, no erythema or bulging of the left TM itself    Tenderness to palpation of the left preauricular area without underlying fluctuance or crepitus  No overlying erythema or overlying skin changes     Nose: Nose normal       Mouth/Throat:      Mouth: Mucous membranes are moist    Eyes:      Extraocular Movements: Extraocular movements intact  Conjunctiva/sclera: Conjunctivae normal       Pupils: Pupils are equal, round, and reactive to light  Cardiovascular:      Rate and Rhythm: Normal rate and regular rhythm  Pulses: Normal pulses  Heart sounds: Normal heart sounds  No murmur heard  Pulmonary:      Effort: Pulmonary effort is normal  No respiratory distress  Breath sounds: Normal breath sounds  Abdominal:      General: Abdomen is flat  Palpations: Abdomen is soft  Tenderness: There is no abdominal tenderness  There is no guarding or rebound  Musculoskeletal:         General: No swelling or tenderness  Normal range of motion  Cervical back: Normal range of motion and neck supple  No tenderness  Right lower leg: No edema  Left lower leg: No edema  Skin:     General: Skin is warm and dry  Capillary Refill: Capillary refill takes less than 2 seconds  Neurological:      Mental Status: She is alert  Cranial Nerves: No cranial nerve deficit  Sensory: No sensory deficit  Motor: No weakness        Coordination: Coordination normal       Comments: Alert and oriented to self and place, not to time   Psychiatric:         Mood and Affect: Mood normal          Behavior: Behavior normal          ED Medications  Medications   ceftriaxone (ROCEPHIN) 1 g/50 mL in dextrose IVPB (has no administration in time range)   lactated ringers bolus 500 mL (0 mL Intravenous Stopped 1/3/23 0939)   acetaminophen (TYLENOL) tablet 650 mg (650 mg Oral Given 1/3/23 0818)   predniSONE tablet 60 mg (60 mg Oral Given 1/3/23 0939)   fluconazole (DIFLUCAN) tablet 200 mg (200 mg Oral Given 1/3/23 0939)   lactated ringers bolus 500 mL (0 mL Intravenous Stopped 1/3/23 1143)       Diagnostic Studies  Results Reviewed     Procedure Component Value Units Date/Time    Urine Microscopic [[id number]]  (Abnormal) Collected: 01/03/23 1144    Lab Status: Final result Specimen: Urine, Straight Cath Updated: 01/03/23 1216     RBC, UA 1-2 /hpf      WBC, UA 4-10 /hpf      Epithelial Cells None Seen /hpf      Bacteria, UA Innumerable /hpf     UA w Reflex to Microscopic w Reflex to Culture [[id number]]  (Abnormal) Collected: 01/03/23 1144    Lab Status: Final result Specimen: Urine, Straight Cath Updated: 01/03/23 1208     Color, UA Yellow     Clarity, UA Clear     Specific Gravity, UA 1 016     pH, UA 6 0     Leukocytes, UA Negative     Nitrite, UA Positive     Protein,  (3+) mg/dl      Glucose, UA Negative mg/dl      Ketones, UA Negative mg/dl      Urobilinogen, UA <2 0 mg/dl      Bilirubin, UA Negative     Occult Blood, UA Negative    Sedimentation rate, automated [[id number]]  (Abnormal) Collected: 01/03/23 0443    Lab Status: Final result Specimen: Blood from Arm, Left Updated: 01/03/23 0854     Sed Rate 56 mm/hour     Lactic acid, plasma [[id number]]  (Normal) Collected: 01/03/23 0805    Lab Status: Final result Specimen: Blood from Arm, Right Updated: 01/03/23 0842     LACTIC ACID 0 7 mmol/L     Narrative:      Result may be elevated if tourniquet was used during collection  Blood culture #1 [[id number]] Collected: 01/03/23 0805    Lab Status: In process Specimen: Blood from Arm, Right Updated: 01/03/23 0812    Blood culture #2 [[id number]] Collected: 01/03/23 0812    Lab Status:  In process Specimen: Blood from Line, Venous Updated: 01/03/23 0812    HS Troponin I 2hr [[id number]]  (Normal) Collected: 01/03/23 0655    Lab Status: Final result Specimen: Blood from Arm, Left Updated: 01/03/23 0730     hs TnI 2hr 5 ng/L      Delta 2hr hsTnI 0 ng/L     HS Troponin 0hr (reflex protocol) [[id number]]  (Normal) Collected: 01/03/23 0443 Lab Status: Final result Specimen: Blood from Arm, Left Updated: 01/03/23 0543     hs TnI 0hr 5 ng/L     FLU/RSV/COVID - if FLU/RSV clinically relevant [[id number]]  (Normal) Collected: 01/03/23 0444    Lab Status: Final result Specimen: Nares from Nose Updated: 01/03/23 0538     SARS-CoV-2 Negative     INFLUENZA A PCR Negative     INFLUENZA B PCR Negative     RSV PCR Negative    Narrative:      FOR PEDIATRIC PATIENTS - copy/paste COVID Guidelines URL to browser: [email or web address]  Splother    SARS-CoV-2 assay is a Nucleic Acid Amplification assay intended for the  qualitative detection of nucleic acid from SARS-CoV-2 in nasopharyngeal  swabs  Results are for the presumptive identification of SARS-CoV-2 RNA  Positive results are indicative of infection with SARS-CoV-2, the virus  causing COVID-19, but do not rule out bacterial infection or co-infection  with other viruses  Laboratories within the [place] and its  territories are required to report all positive results to the appropriate  public health authorities  Negative results do not preclude SARS-CoV-2  infection and should not be used as the sole basis for treatment or other  patient management decisions  Negative results must be combined with  clinical observations, patient history, and epidemiological information  This test has not been FDA cleared or approved  This test has been authorized by FDA under an Emergency Use Authorization  (EUA)  This test is only authorized for the duration of time the  declaration that circumstances exist justifying the authorization of the  emergency use of an in vitro diagnostic tests for detection of SARS-CoV-2  virus and/or diagnosis of COVID-19 infection under section 564(b)(1) of  the Act, 21 U  S C  544EYO-5(D)(0), unless the authorization is terminated  or revoked sooner  The test has been validated but independent review by FDA  and CLIA is pending      Test performed using [employer] GeneXpert: This RT-PCR assay targets N2,  a region unique to SARS-CoV-2  A conserved region in the E-gene was chosen  for pan-Sarbecovirus detection which includes SARS-CoV-2  According to CMS-2020-01-R, this platform meets the definition of high-throughput technology      Comprehensive metabolic panel [[id number]]  (Abnormal) Collected: 01/03/23 0443    Lab Status: Final result Specimen: Blood from Arm, Left Updated: 01/03/23 0522     Sodium 139 mmol/L      Potassium 4 0 mmol/L      Chloride 100 mmol/L      CO2 29 mmol/L      ANION GAP 10 mmol/L      BUN 40 mg/dL      Creatinine 1 50 mg/dL      Glucose 78 mg/dL      Calcium 10 6 mg/dL      AST 64 U/L       U/L      Alkaline Phosphatase 75 U/L      Total Protein 7 5 g/dL      Albumin 4 3 g/dL      Total Bilirubin 0 49 mg/dL      eGFR 34 ml/min/1 73sq m     Narrative:      National Kidney Disease Foundation guidelines for Chronic Kidney Disease (CKD):   •  Stage 1 with normal or high GFR (GFR > 90 mL/min/1 73 square meters)  •  Stage 2 Mild CKD (GFR = 60-89 mL/min/1 73 square meters)  •  Stage 3A Moderate CKD (GFR = 45-59 mL/min/1 73 square meters)  •  Stage 3B Moderate CKD (GFR = 30-44 mL/min/1 73 square meters)  •  Stage 4 Severe CKD (GFR = 15-29 mL/min/1 73 square meters)  •  Stage 5 End Stage CKD (GFR <15 mL/min/1 73 square meters)  Note: GFR calculation is accurate only with a steady state creatinine    CBC and differential [[id number]]  (Abnormal) Collected: 01/03/23 0443    Lab Status: Final result Specimen: Blood from Arm, Left Updated: 01/03/23 0452     WBC 12 06 Thousand/uL      RBC 5 19 Million/uL      Hemoglobin 14 9 g/dL      Hematocrit 44 7 %      MCV 86 fL      MCH 28 7 pg      MCHC 33 3 g/dL      RDW 15 3 %      MPV 10 7 fL      Platelets 054 Thousands/uL      nRBC 0 /100 WBCs      Neutrophils Relative 74 %      Immat GRANS % 0 %      Lymphocytes Relative 17 %      Monocytes Relative 7 %      Eosinophils Relative 2 % Basophils Relative 0 %      Neutrophils Absolute 8 78 Thousands/µL      Immature Grans Absolute 0 04 Thousand/uL      Lymphocytes Absolute 2 09 Thousands/µL      Monocytes Absolute 0 89 Thousand/µL      Eosinophils Absolute 0 21 Thousand/µL      Basophils Absolute 0 05 Thousands/µL                  CT facial bones without contrast   Final Result by Ron Lopez DO (01/03 1039)      Normal noncontrast CT of the facial bones  Workstation performed: [id number]         CT head without contrast   Final Result by Nerissa Hernandez MD (01/03 9904)      No acute intracranial abnormality  Mild microangiopathic changes are similar to the prior study  Workstation performed: [id number]         CT spine cervical without contrast   Final Result by Nerissa Hernandez MD (01/03 6714)      No acute cervical spine fracture or traumatic malalignment  Workstation performed: [id number]               Procedures  Procedures      ED Course  ED Course as of 01/03/23 1410   Tue Jan 03, 2023   0928 Sed Rate(!): 56  Elevated from 31 2 days ago                          Initial Sepsis Screening     Row Name 01/03/23 9670                Is the patient's history suggestive of a new or worsening infection? Yes (Proceed)  -MS        Suspected source of infection suspect infection, source unknown  -MS        Are two or more of the following signs & symptoms of infection both present and new to the patient?  No  -MS        Indicate SIRS criteria Leukocytosis (WBC > 49288 IJL)  -MS        If the answer is yes to both questions, suspicion of sepsis is present --        If severe sepsis is present AND tissue hypoperfusion perists in the hour after fluid resuscitation or lactate > 4, the patient meets criteria for SEPTIC SHOCK --        Are any of the following organ dysfunction criteria present within 6 hours of suspected infection and SIRS criteria that are NOT considered to be chronic conditions? No  -MS        Organ dysfunction --        Date of presentation of severe sepsis --        Time of presentation of severe sepsis --        Tissue hypoperfusion persists in the hour after crystalloid fluid administration, evidenced, by either: --        Was hypotension present within one hour of the conclusion of crystalloid fluid administration? No  -MS        Date of presentation of septic shock --        Time of presentation of septic shock --              User Key  (r) = Recorded By, (t) = Taken By, (c) = Cosigned By    [address] Name Provider Jermaine Harrington MD Resident                          Medical Decision Making  Patient remained stable throughout ED course  Patient was found to have a negative work-up for similar left-sided headache done on 1/1/2023, however ESR was elevated today at 56 from 31  Given tenderness to the left preauricular area as well as left temple with increasing ESR, patient was given 60 mg prednisone for treatment of possible GCA after which she was more alert and was A&O x3 compared to A&O to prior to steroids  Patient also underwent a CT facial bone scan which is negative, CT head, CT C-spine also negative for evaluation after fall with no LOC today  Patient was also found to have a UTI, which may be etiology for generalized weakness, fatigue, objective lethargy  Patient agrees with plan for admission given AMS in the context of elevated ESR, acute UTI  Pt understands and agreed with plan  All questions answered  No other concerns              Disposition  Final diagnoses:   AMS (altered mental status)   Generalized weakness   Headache   UTI (urinary tract infection)     Time reflects when diagnosis was documented in both MDM as applicable and the Disposition within this note     Time User Action Codes Description Comment    1/3/2023 11:37 AM Keerthi Vera Add [R41 82] AMS (altered mental status)     1/3/2023 11:38 AM Shireen Harrington Add [R53 1] Generalized weakness     1/3/2023 11:38 AM Ena Harrington [R51 9] Headache     1/3/2023  2:10 PM Ena Harrington [N39 0] UTI (urinary tract infection)       ED Disposition     ED Disposition   Admit    Condition   Stable    Date/Time   Tue Yahir 3, 2023 11:38 AM    Comment   Case was discussed with Dr Kaylie Quesada and the patient's admission status was agreed to be Admission Status: inpatient status to the service of Dr Kaylie Quesada   Follow-up Information    None         Patient's Medications   Discharge Prescriptions    No medications on file     No discharge procedures on file  PDMP Review       Value Time User    PDMP Reviewed  Yes 11/10/2021  3:47 PM Josephine Goodpasture, MD           ED Provider  Attending physically available and evaluated Peter Beauchamp  [facility] managed the patient along with the ED Attending      Electronically Signed by         Robert Bedoya MD  01/03/23 6891

## 2023-01-03 NOTE — ASSESSMENT & PLAN NOTE
Patient presented with headache starting approximately 5 days ago  She came to the emergency department on 1/1/23 and was discharged with Fioricet with some improvement in her headache  She returns today with worsening headache with associated left temporal tenderness  He also has some associated confusion  She was found to have mildly elevated ESR of 56 and out of concern for GCA she was given prednisone in the emergency department  Patient also reports some shoulder and low back pain but may be secondary to osteoarthritis       Plan:  · Continue with prednisone 60 mg  · Neurology consult appreciate recommendations  · Follow-up repeat CRP

## 2023-01-03 NOTE — ASSESSMENT & PLAN NOTE
Lab Results   Component Value Date    HGBA1C 6 7 (H) 11/04/2022       No results for input(s): POCGLU in the last 72 hours  Blood Sugar Average: Last 72 hrs:  Patient's home diabetes regimen is NovoLog with meals  16 units with breakfast, 16 units with lunch, and 25 units with dinner  Levemir 50 units at bedtime       Plan:  · Humalog 12 units with breakfast, 12 units with lunch, 20 units with dinner  · Detemir 40 units at bedtime  · Sliding scale insulin  · Carb controlled diet

## 2023-01-03 NOTE — CONSULTS
Consultation - Neurology   Adrienne Teague 67 y o  female MRN: [id number]  Unit/Bed#: ED-08 Encounter: [id number]      Assessment/Plan     Headache  Assessment & Plan  66 y/o female with arthritis, CKD, COPD, depression, DM, neuropathy, hypothyroidism, GERD, HTN, HLD, RLS, who presents with left sided headache x 5 days and new onset confusion on 1/3/23  Concern for possible GCA- discussed with attending neurologist and recommend temporal artery biopsy for further evaluation and initiating Solumedrol  Plan:  - Recommend obtaining temporal artery biopsy  - Recommend initiating Solumedrol 500 mg IV daily x 3 days  - S/p Tylenol 650 mg in ED x 1  - S/p Prednisone 60 mg in ED x 1  - Monitor neuro exam; notify with any changes  - Medical management and supportive care per primary team  Correction of any metabolic or infectious disturbances  Results:  - CT head: No acute intracranial abnormality  Mild microangiopathic changes are similar to the prior study  - 1/1 Labs: ESR 31, CRP 1 4  - 1/3 Labs: WBC 12 06, UA concerning for UTI, AST 64, , ESR 56, COVID/flu/RSV negative    Type 2 diabetes mellitus with diabetic chronic kidney disease ([facility] [address] )  Assessment & Plan  Lab Results   Component Value Date    HGBA1C 6 7 (H) 11/04/2022       No results for input(s): POCGLU in the last 72 hours  Blood Sugar Average: Last 72 hrs:         - Medical management per primary team    * UTI (urinary tract infection)  Assessment & Plan  - UA concerning for UTI  - Started on ceftriaxone  - Currently afebrile   - May be contributing to confusion  - Medical management per primary team      Recommendations for outpatient neurological follow up have yet to be determined  Case and treatment plan reviewed with attending neurologist, Dr Haroldo Suazo  Please see attending attestation for any further recommendations        History of Present Illness     Reason for Consult / Principal Problem: Headache  Hx and PE limited by: Poor historian  HPI: Haroon Dozier is a 67 y o   female with arthritis, CKD, COPD, depression, DM, neuropathy, hypothroidism, GERD, HTN, HLD, RLS, who presents with headache  Per chart review, patient presented with left sided headache x 5 days and confusion starting today  Patient was previously evaluated in the ED 2 days ago for headache and she was ultimately discharged with Fioricet  There was some improvement with Fioricet but patient re-presents to the hospital due to worsening headache and confusion  Patient seen and evaluated at the bedside with attending neurologist  Patient states that since last Thursday, she has been having dizziness, intermittent lightheadedness, confusion, and left temporal headache, described as stabbing quality  She denies any vision issues  She is unsure if the headache is getting better or not  She states palpating the area of the headache improves the pain  She has not taken any medications for the pain  She denies nausea or vomiting  She has a history of bilateral cataracts  Inpatient consult to Neurology  Consult performed by: TC Weinstein  Consult ordered by: Sandra Rivers MD          Review of Systems   Constitutional: Negative for fever  HENT: Negative for trouble swallowing  Eyes: Positive for visual disturbance  Respiratory: Negative for shortness of breath  Cardiovascular: Negative for chest pain  Gastrointestinal: Negative for abdominal pain  Musculoskeletal: Negative for neck pain  Skin: Negative for rash  Neurological: Positive for dizziness, light-headedness and headaches  Psychiatric/Behavioral: Positive for confusion  All other systems reviewed and are negative        Historical Information   Past Medical History:   Diagnosis Date   • Allergic    • Allergic rhinitis    • Anesthesia     pt reports "had to use double lumen endo tube for hiatal hernia repair/so surgeon could get to where he needed to work"   • Arthritis    • Aspiration into airway    • Basal cell carcinoma 2007    left cheek    • BCC (basal cell carcinoma) 05/27/2021    Left Nasal tip   • Cancer (HCC)     squamous cell cancer on forhead   • Cancer (HCC)     basal cell on nose    • Chronic kidney disease 2000, 2018    Stones, kidney disease stage 4   • Chronic pain disorder     bilat feet and joint pain on occas   • Colon polyp    • Constipation    • COPD (chronic obstructive pulmonary disease) ([facility] [address] )    • COVID-19 08/2021    recovered at home/did receive monoclonal infusion   • Dental bridge present    • Depression    • Diabetes mellitus ([facility] )     Type 2   • Diabetic neuropathy ([place] [address] )    • Disease of thyroid gland    • Family history of thyroid problem    • Fatty liver    • GERD (gastroesophageal reflux disease)    • Heart burn    • Hiatal hernia    • History of pneumonia    • Hyperlipidemia    • Hyperplasia, parathyroid ([facility]  )    • Hypertension    • Kidney problem    • Kidney stone    • Memory loss Julu2 2020   • Motion sickness    • Motion sickness    • Neck pain    • Obesity 1978   • Obesity (BMI 30 0-34  9)    • Pollen allergies    • RLS (restless legs syndrome)    • SCC (squamous cell carcinoma) 05/04/2021    left mid forehead   • Seasonal allergies    • Sleep apnea     has inspire   • Squamous cell skin cancer 2007    left cheek    • Swollen ankles    • 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, left     great toe fusion   • Urinary incontinence    • Urinary tract infection 03/28/2022   • Wears glasses      Past Surgical History:   Procedure Laterality Date   • ARTHROSCOPY KNEE     • BREAST BIOPSY      stereotactic-benign   • BREAST BIOPSY      stereo-benign   • BREAST EXCISIONAL BIOPSY      unknown date-benign   • BREAST EXCISIONAL BIOPSY      unknown date-benign   • BREAST EXCISIONAL BIOPSY      unknown date-benign   • BREAST EXCISIONAL BIOPSY      unknown age-benign   • CARDIAC CATHETERIZATION     • CHOLECYSTECTOMY     • COLONOSCOPY     • EXAMINATION UNDER ANESTHESIA N/A 2021    Procedure: EXAM UNDER ANESTHESIA (EUA), DISE;  Surgeon: Nicolas Montana MD;  [facility]: [facility] MAIN OR;  Service: ENT   • HERNIA REPAIR     • HIATAL HERNIA REPAIR     • KNEE SURGERY      Torn maniscus lap surg   • LIPOSUCTION     • LYMPH NODE BIOPSY     • MOHS SURGERY  2021    left mid forehead-Gautam   • MOHS SURGERY Left 2021    Left nasal tip- gautam    • CO OPEN IMPLANTATION CRANIAL NERVE VASQUEZ & PULSE GEN N/A 11/10/2021    Procedure: INSERTION UPPER AIRWAY STIMULATOR, INSPIRE IMPLANT;  Surgeon: Nicolas Montana MD;  Location: [facility];  Service: ENT   • CO UNLISTED PROCEDURE FOOT/TOES Right 2022    Procedure: 1st metatarsal phalangeal joint fusion;  Surgeon: Alexi Toledo DPM;  Location: [facility];  Service: Podiatry   • REDUCTION MAMMAPLASTY Bilateral    • REDUCTION MAMMAPLASTY     • SKIN BIOPSY     • SKIN CANCER EXCISION  2007    squamous cell carcinoma    • SKIN CANCER EXCISION  2007    basal cell carcinoma   • SQUAMOUS CELL CARCINOMA EXCISION     • TOE SURGERY Right 2022    Right Great Toe Fusion   • TONSILLECTOMY     • TUBAL LIGATION     • UPPER GASTROINTESTINAL ENDOSCOPY     • US GUIDED BREAST BIOPSY RIGHT COMPLETE Right 2022     Social History   Social History     Substance and Sexual Activity   Alcohol Use Yes    Comment: 1 or 2 a year     Social History     Substance and Sexual Activity   Drug Use No     E-Cigarette/Vaping   • E-Cigarette Use Never User      E-Cigarette/Vaping Substances   • Nicotine No    • THC No    • CBD No    • Flavoring No    • Other No    • Unknown No      Social History     Tobacco Use   Smoking Status Former   • Packs/day: 2 00   • Years: 10 00   • Pack years: 20 00   • Types: Cigarettes   • Start date: 1968   • Quit date: 1976   • Years since quittin 0   Smokeless Tobacco Never   Tobacco Comments    Smoked 2 pack a day     Family History:   Family History   Problem Relation Age of Onset   • Heart disease Mother • Depression Mother    • Hypertension Mother    • COPD Mother    • Hearing loss Mother    • Anxiety disorder Mother    • Heart disease Father    • Lung cancer Father 79        Smoker    • Cancer Father         brain   • Alcohol abuse Father    • Dementia Father    • Hypertension Father    • Thyroid disease Father    • COPD Father    • Arthritis Father    • Brain cancer Father 76   • Hypertension Sister    • Diabetes Sister    • Heart disease Sister    • Thyroid disease Sister    • Cancer Sister         Lympoma, lung   • Lung cancer Sister 78   • Anxiety disorder Sister    • Hypertension Brother    • Diabetes Brother    • Cancer Brother         Throat   • Dementia Brother    • Stroke Brother    • Hypertension Brother    • Heart disease Brother    • Diabetes Brother    • No Known Problems Son    • No Known Problems Son    • Brain cancer Paternal Aunt         unknown age   • Breast cancer Neg Hx        Review of previous medical records was completed  Meds/Allergies   all current active meds have been reviewed, current meds:   Current Facility-Administered Medications   Medication Dose Route Frequency   • ceftriaxone (ROCEPHIN) 1 g/50 mL in dextrose IVPB  1,000 mg Intravenous Once    and PTA meds:   Prior to Admission Medications   Prescriptions Last Dose Informant Patient Reported? Taking?    B-D ULTRAFINE III SHORT PEN 31G X 8 MM MISC 1/2/2023  Yes Yes   Sig: USE AS DIRECTED 4 TIMES PER DAY   BIOTIN PO Not Taking  Yes No   Sig: Take by mouth   Patient not taking: Reported on 1/3/2023   Biotin w/ Vitamins C & E (HAIR SKIN & NAILS GUMMIES PO) 1/2/2023  Yes Yes   Sig: Take by mouth   Calcium Citrate 1040 MG TABS 1/2/2023  Yes Yes   Sig: Take 500 mg by mouth Three times a day   DULoxetine (CYMBALTA) 30 mg delayed release capsule 1/2/2023  Yes Yes   Sig: Take 60 mg by mouth daily   Krill Oil (Omega-3) 500 MG CAPS Not Taking  Yes No   Sig: Take 1,600 mg by mouth daily   Patient not taking: Reported on 1/3/2023 Probiotic Product (PROBIOTIC PO) 2023  Yes Yes   Sig: Take 1 capsule by mouth 2 (two) times a day   Vitamin D, Ergocalciferol, 2000 units CAPS 2023  Yes Yes   Sig: Take 2,000 Units by mouth daily    acetaminophen (TYLENOL) 325 mg tablet 1/3/2023  No Yes   Sig: Take 2 tablets (650 mg total) by mouth every 6 (six) hours as needed for mild pain, headaches or fever   albuterol (Ventolin HFA) 90 mcg/act inhaler Past Month  No Yes   Sig: Inhale 2 puffs every 6 (six) hours as needed for wheezing   amLODIPine (NORVASC) 5 mg tablet 2023  No Yes   Sig: Take 1 tablet (5 mg total) by mouth daily   azelastine (ASTELIN) 0 1 % nasal spray   No No   Si spray into each nostril 2 (two) times a day Use in each nostril as directed   b complex vitamins capsule 2023  Yes Yes   Sig: Take 1 capsule by mouth daily   butalbital-acetaminophen-caffeine (FIORICET,ESGIC) -40 mg per tablet Past Week  No Yes   Sig: Take 1 tablet by mouth every 4 (four) hours as needed for headaches for up to 10 days   calcium carbonate (TUMS) 500 mg chewable tablet Not Taking  Yes No   Sig: Chew 1 tablet 2 (two) times a day   Patient not taking: Reported on 2022   estradiol (ESTRACE VAGINAL) 0 1 mg/g vaginal cream 2023  No Yes   Sig: Insert 1 g into the vagina 3 (three) times a week   famotidine (PEPCID) 10 mg tablet 2023  No Yes   Sig: Take 2 tablets (20 mg total) by mouth daily at bedtime   fluorouracil (EFUDEX) 5 % cream 2023  No Yes   Sig: Apply topically twice a day for 7 days to the forehead   gabapentin (Neurontin) 300 mg capsule 2023  No Yes   Sig: Take 1 capsule (300 mg total) by mouth daily at bedtime   insulin aspart (NovoLOG) 100 units/mL injection 2023  Yes Yes   Sig: Inject under the skin 3 (three) times a day before meals 16 units, 16 units, 25 units     insulin detemir (Levemir FlexTouch) 100 Units/mL injection pen 2023  Yes Yes   Sig: Inject 50 Units under the skin every evening levothyroxine 112 mcg tablet 1/2/2023  Yes Yes   Sig: Take 112 mcg by mouth every morning   metoprolol tartrate (LOPRESSOR) 50 mg tablet 1/2/2023  No Yes   Sig: TAKE 1 TABLET (50 MG TOTAL) BY MOUTH EVERY 12 (TWELVE) HOURS   olmesartan (BENICAR) 20 mg tablet 1/2/2023  No Yes   Sig: Take 1 tablet (20mg) by mouth in the morning daily   olmesartan (BENICAR) 5 mg tablet 1/2/2023  No Yes   Sig: Take 2 tablets (10 mg total) by mouth daily   pantoprazole (PROTONIX) 40 mg tablet 1/2/2023  No Yes   Sig: TAKE 1 TABLET BY MOUTH TWICE A DAY   pramipexole (MIRAPEX) 0 25 mg tablet 1/2/2023  No Yes   Sig: Take 1 tablet (0 25 mg total) by mouth daily at bedtime   rosuvastatin (CRESTOR) 20 MG tablet   No No   Sig: Take 1 tablet (20 mg total) by mouth daily   sucralfate (CARAFATE) 1 g tablet   No No   Sig: Take 1 tablet (1 g total) by mouth 4 (four) times a day for 10 days Crush tablet and dissolve in 8oz water to make a sluree   Patient not taking: No sig reported   torsemide (DEMADEX) 20 mg tablet 1/2/2023  No Yes   Sig: TAKE 1 TABLET BY MOUTH EVERY DAY      Facility-Administered Medications: None       Allergies   Allergen Reactions   • Sulfamethoxazole-Trimethoprim Other (See Comments)     Tongue swelling   • Ciprofloxacin Hives and Itching       Objective   Vitals:Blood pressure 162/77, pulse 68, temperature 98 3 °F (36 8 °C), temperature source Oral, resp  rate 18, weight 75 kg (165 lb 5 5 oz), SpO2 94 %  ,Body mass index is 30 24 kg/m²  Intake/Output Summary (Last 24 hours) at 1/3/2023 1519  Last data filed at 1/3/2023 1143  Gross per 24 hour   Intake 1000 ml   Output --   Net 1000 ml       Invasive Devices: Invasive Devices     Peripheral Intravenous Line  Duration           Peripheral IV 01/03/23 Distal;Left;Ventral (anterior) Forearm <1 day                Physical exam performed by attending neurologist:  Physical Exam  Vitals and nursing note reviewed  Constitutional:       General: She is not in acute distress  Appearance: Normal appearance  She is not ill-appearing  Comments: Drowsy but easily arousable to stimuli   HENT:      Head: Normocephalic  Mouth/Throat:      Mouth: Mucous membranes are moist       Pharynx: Oropharynx is clear  Eyes:      General: No scleral icterus  Right eye: No discharge  Left eye: No discharge  Extraocular Movements: Extraocular movements intact and EOM normal       Conjunctiva/sclera: Conjunctivae normal       Pupils: Pupils are equal, round, and reactive to light  Cardiovascular:      Rate and Rhythm: Normal rate  Pulmonary:      Effort: Pulmonary effort is normal  No respiratory distress  Musculoskeletal:         General: Normal range of motion  Cervical back: Normal range of motion  Skin:     General: Skin is warm and dry  Coloration: Skin is not jaundiced or pale  Psychiatric:         Mood and Affect: Mood normal        Neurologic Exam     Mental Status   Oriented to person and place  Drowsy but easily arousable to verbal stimuli  Able to follow commands appropriately  No dysarthria noted  Cranial Nerves     CN III, IV, VI   Pupils are equal, round, and reactive to light  Extraocular motions are normal      CN V   Facial sensation intact  CN VIII   Hearing: intact    CN IX, X   Palate: symmetric    CN XII   CN XII normal    Inconsistent VF testing  Very slight left eyelid droop but symmetric eyelid resistance bilaterally  Face symmetric at rest and with smiling     Motor Exam   Muscle bulk: normal  Moves bilateral UE/LE purposefully without focal weakness noted     Gait, Coordination, and Reflexes     Tremor   Resting tremor: absent      Lab Results:   I have personally reviewed pertinent reports    , CBC:   Results from last 7 days   Lab Units 01/03/23  0443 01/01/23  0558   WBC Thousand/uL 12 06* 9 54   RBC Million/uL 5 19* 4 65   HEMOGLOBIN g/dL 14 9 13 5   HEMATOCRIT % 44 7 41 6   MCV fL 86 90   PLATELETS Thousands/uL 274 253 , BMP/CMP:   Results from last 7 days   Lab Units 01/03/23  0443 01/01/23  0558   SODIUM mmol/L 139 138   POTASSIUM mmol/L 4 0 4 3   CHLORIDE mmol/L 100 104   CO2 mmol/L 29 27   BUN mg/dL 40* 42*   CREATININE mg/dL 1 50* 1 99*   CALCIUM mg/dL 10 6* 9 1   AST U/L 64*  --    ALT U/L 140*  --    ALK PHOS U/L 75  --    EGFR ml/min/1 73sq m 34 24   , Vitamin B12:   , HgBA1C:   , TSH:   , Coagulation:   , Lipid Profile:   , Ammonia:   , Urinalysis:   Results from last 7 days   Lab Units 01/03/23  1144   COLOR UA  Yellow   CLARITY UA  Clear   SPEC GRAV UA  1 016   PH UA  6 0   LEUKOCYTES UA  Negative   NITRITE UA  Positive*   GLUCOSE UA mg/dl Negative   KETONES UA mg/dl Negative   BILIRUBIN UA  Negative   BLOOD UA  Negative   , Drug Screen:   , Medication Drug Levels:       Invalid input(s): CARBAMAZEPINE,  PHENOBARB, LACOSAMIDE, OXCARBAZEPINE     Imaging Studies: I have personally reviewed pertinent reports  EKG, Pathology, and Other Studies: I have personally reviewed pertinent reports        Code Status: Prior  Advance Directive and Living Will: Yes    Power of :    POLST:

## 2023-01-03 NOTE — ASSESSMENT & PLAN NOTE
Patient with hypertension, continue with home regimen  Torsemide 20 mg, metoprolol 50 mg twice daily, amlodipine 5 mg, 20 mg olmesartan a m  and 10 mg p m  (formulary replacement with losartan)    Plan:  · Continue torsemide  · Continue metoprolol  · Continue amlodipine  · Losartan 50 mg a m , 25 mg p m

## 2023-01-03 NOTE — H&P
[facility]  H&P- Lonnie Mccormick 1950, 67 y o  female MRN: [id number]  Unit/Bed#: ED-08 Encounter: [id number]  Primary Care Provider: Michael Thomas MD   Date and time admitted to hospital: 1/3/2023  4:32 AM    * UTI (urinary tract infection)  Assessment & Plan  Patient with suprapubic pain and UA showing normal bacteria and 4-10 WBCs  Patient has history of UTI with Enterobacter cloacae  Patient also has mildly elevated WBC at 12 and some confusion oriented to person only  Given suprapubic tenderness and leukocytosis with positive UA, will treat for suspected UTI  Previous cultures show susceptibility to cefepime  Plan:  · Cefepime 1 g every 12 hours  · Follow-up urine culture    Headache  Assessment & Plan  Patient presented with headache starting approximately 5 days ago  She came to the emergency department on 1/1/23 and was discharged with Fioricet with some improvement in her headache  She returns today with worsening headache with associated left temporal tenderness  He also has some associated confusion  She was found to have mildly elevated ESR of 56 and out of concern for GCA she was given prednisone in the emergency department  Patient also reports some shoulder and low back pain but may be secondary to osteoarthritis  Plan:  · Continue with prednisone 60 mg  · Neurology consult appreciate recommendations  · Follow-up repeat CRP    Hypothyroid  Assessment & Plan  Continue levothyroxine 112    HTN (hypertension)  Assessment & Plan  Patient with hypertension, continue with home regimen  Torsemide 20 mg, metoprolol 50 mg twice daily, amlodipine 5 mg, 20 mg olmesartan a m  and 10 mg p m  (formulary replacement with losartan)    Plan:  · Continue torsemide  · Continue metoprolol  · Continue amlodipine  · Losartan 50 mg a m , 25 mg p m        Type 2 diabetes mellitus with diabetic chronic kidney disease [facility])  Assessment & Plan  Lab Results   Component Value Date HGBA1C 6 7 (H) 11/04/2022       No results for input(s): POCGLU in the last 72 hours  Blood Sugar Average: Last 72 hrs:  Patient's home diabetes regimen is NovoLog with meals  16 units with breakfast, 16 units with lunch, and 25 units with dinner  Levemir 50 units at bedtime  Plan:  · Humalog 12 units with breakfast, 12 units with lunch, 20 units with dinner  · Detemir 40 units at bedtime  · Sliding scale insulin  · Carb controlled diet      VTE Pharmacologic Prophylaxis: VTE Score: 3 Moderate Risk (Score 3-4) - Pharmacological DVT Prophylaxis Ordered: enoxaparin (Lovenox)  Code Status: Level 3 DNR/DNI per patient  Discussion with family: Updated  () at bedside  Anticipated Length of Stay: Patient will be admitted on an observation basis with an anticipated length of stay of less than 2 midnights secondary to Headache and suspected UTI  Chief Complaint: Left-sided headache for 5 days and confusion starting today    History of Present Illness:  Angelo Moreira is a 67 y o  female with a PMH of diabetes, hypertension, osteoarthritis who presents with left-sided headache for 5 days and confusion starting today  Patient was previously seen in the emergency department on 1/1/23 for left-sided headache and GCA work-up which was unremarkable, and she was discharged with Fioricet  She had some improvement with Fioricet but returns today with worsening headache and some confusion per family  Patient was initially disoriented in the emergency department and then received prednisone with improvement in her confusion  When I evaluated the patient and she was confused and oriented to person only  Her  was at bedside to provide further history  Patient denies any vision changes or new weakness, numbness, tingling   She denies dysuria or abdominal pain but had suprapubic tenderness on exam     In the emergency department, was found to have mildly elevated ESR at 56 she received 60 mg prednisone for concern for GCA  Urinalysis was also done showing normal bacteria and 4-10 WBCs  Review of Systems:  Review of Systems   Constitutional: Negative for chills and fever  HENT: Negative for rhinorrhea and sore throat  Eyes: Negative for visual disturbance  Respiratory: Positive for shortness of breath  Negative for cough  Cardiovascular: Negative for chest pain and palpitations  Gastrointestinal: Positive for abdominal pain (suprapubic)  Negative for constipation, diarrhea, nausea and vomiting  Genitourinary: Negative for dysuria  Musculoskeletal: Positive for arthralgias  Skin: Negative  Allergic/Immunologic: Negative  Neurological: Positive for headaches  Psychiatric/Behavioral: Positive for confusion         Past Medical and Surgical History:   Past Medical History:   Diagnosis Date   • Allergic    • Allergic rhinitis    • Anesthesia     pt reports "had to use double lumen endo tube for hiatal hernia repair/so surgeon could get to where he needed to work"   • Arthritis    • Aspiration into airway    • Basal cell carcinoma 2007    left cheek    • BCC (basal cell carcinoma) 05/27/2021    Left Nasal tip   • Cancer (HCC)     squamous cell cancer on forhead   • [facility]     basal cell on nose    • Chronic kidney disease 2000, 2018    Stones, kidney disease stage 4   • Chronic pain disorder     bilat feet and joint pain on occas   • Colon polyp    • Constipation    • COPD (chronic obstructive pulmonary disease) ([facility] )    • COVID-19 08/2021    recovered at home/did receive monoclonal infusion   • Dental bridge present    • Depression    • Diabetes mellitus ([facility] )     Type 2   • Diabetic neuropathy ([facility] )    • Disease of thyroid gland    • Family history of thyroid problem    • Fatty liver    • GERD (gastroesophageal reflux disease)    • Heart burn    • Hiatal hernia    • History of pneumonia    • Hyperlipidemia    • Hyperplasia, parathyroid ([facility] )    • Hypertension    • Kidney problem    • Kidney stone    • Memory loss Julu2 2020   • Motion sickness    • Motion sickness    • Neck pain    • Obesity 1978   • Obesity (BMI 30 0-34  9)    • Pollen allergies    • RLS (restless legs syndrome)    • SCC (squamous cell carcinoma) 05/04/2021    left mid forehead   • Seasonal allergies    • Sleep apnea     has inspire   • Squamous cell skin cancer 2007    left cheek    • Swollen ankles    • 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, left     great toe fusion   • Urinary incontinence    • Urinary tract infection 03/28/2022   • Wears glasses        Past Surgical History:   Procedure Laterality Date   • ARTHROSCOPY KNEE     • BREAST BIOPSY      stereotactic-benign   • BREAST BIOPSY      stereo-benign   • BREAST EXCISIONAL BIOPSY      unknown date-benign   • BREAST EXCISIONAL BIOPSY      unknown date-benign   • BREAST EXCISIONAL BIOPSY      unknown date-benign   • BREAST EXCISIONAL BIOPSY      unknown age-benign   • CARDIAC CATHETERIZATION     • CHOLECYSTECTOMY     • COLONOSCOPY     • EXAMINATION UNDER ANESTHESIA N/A 06/24/2021    Procedure: EXAM UNDER ANESTHESIA (EUA), DISE;  Surgeon: Lucrecia De Jesus MD;  Location: [facility] MAIN OR;  Service: ENT   • HERNIA REPAIR     • HIATAL HERNIA REPAIR     • KNEE SURGERY      Torn maniscus lap surg   • LIPOSUCTION     • LYMPH NODE BIOPSY     • MOHS SURGERY  05/20/2021    left mid forehead-Gautam   • MOHS SURGERY Left 05/27/2021    Left nasal tip- gautam    • HI OPEN IMPLANTATION CRANIAL NERVE VASQUEZ & PULSE GEN N/A 11/10/2021    Procedure: INSERTION UPPER AIRWAY STIMULATOR, INSPIRE IMPLANT;  Surgeon: Lucrecia De Jesus MD;  Location: [facility] Main OR;  Service: ENT   • HI UNLISTED PROCEDURE FOOT/TOES Right 07/19/2022    Procedure: 1st metatarsal phalangeal joint fusion;  Surgeon: Janis Medel DPM;  Location: [facility];  Service: Podiatry   • REDUCTION MAMMAPLASTY Bilateral 2000   • REDUCTION MAMMAPLASTY     • SKIN BIOPSY     • SKIN CANCER EXCISION  2007    squamous cell carcinoma • SKIN CANCER EXCISION  2007    basal cell carcinoma   • SQUAMOUS CELL CARCINOMA EXCISION     • TOE SURGERY Right 08/04/2022    Right Great Toe Fusion   • TONSILLECTOMY     • TUBAL LIGATION     • UPPER GASTROINTESTINAL ENDOSCOPY     • US GUIDED BREAST BIOPSY RIGHT COMPLETE Right 07/18/2022       Meds/Allergies:  Prior to Admission medications    Medication Sig Start Date End Date Taking?  Authorizing Provider   acetaminophen (TYLENOL) 325 mg tablet Take 2 tablets (650 mg total) by mouth every 6 (six) hours as needed for mild pain, headaches or fever 4/14/22  Yes Rufina Peace PA-C   albuterol (Ventolin HFA) 90 mcg/act inhaler Inhale 2 puffs every 6 (six) hours as needed for wheezing 8/4/22  Yes Stephan Deleon,    amLODIPine (NORVASC) 5 mg tablet Take 1 tablet (5 mg total) by mouth daily 11/28/22  Yes Hanny Alegre MD   b complex vitamins capsule Take 1 capsule by mouth daily   Yes Historical Provider, MD CARREON ULTRAFINE III SHORT PEN 31G X 8 MM MISC USE AS DIRECTED 4 TIMES PER DAY 7/26/19  Yes Historical Provider, MD   Biotin w/ Vitamins C & E (HAIR SKIN & NAILS GUMMIES PO) Take by mouth   Yes Historical Provider, MD   butalbital-acetaminophen-caffeine (FIORICET,ESGIC) -40 mg per tablet Take 1 tablet by mouth every 4 (four) hours as needed for headaches for up to 10 days 1/1/23 1/11/23 Yes Joselito Rodriguez MD   Calcium Citrate 1040 MG TABS Take 500 mg by mouth Three times a day 11/16/22  Yes Historical Provider, MD   DULoxetine (CYMBALTA) 30 mg delayed release capsule Take 60 mg by mouth daily 9/7/22  Yes Historical Provider, MD   estradiol (ESTRACE VAGINAL) 0 1 mg/g vaginal cream Insert 1 g into the vagina 3 (three) times a week 9/28/22  Yes Pastora Naidu PA-C   famotidine (PEPCID) 10 mg tablet Take 2 tablets (20 mg total) by mouth daily at bedtime 8/5/22  Yes Rojelio Dick PA-C   fluorouracil (EFUDEX) 5 % cream Apply topically twice a day for 7 days to the forehead 12/15/22  Yes Rae MD   gabapentin (Neurontin) 300 mg capsule Take 1 capsule (300 mg total) by mouth daily at bedtime 9/20/22  Yes Teodoro Solomon DO   insulin aspart (NovoLOG) 100 units/mL injection Inject under the skin 3 (three) times a day before meals 16 units, 16 units, 25 units     Yes Historical Provider, MD   insulin detemir (Levemir FlexTouch) 100 Units/mL injection pen Inject 50 Units under the skin every evening  6/8/21  Yes Historical Provider, MD   levothyroxine 112 mcg tablet Take 112 mcg by mouth every morning 4/6/22  Yes Historical Provider, MD   metoprolol tartrate (LOPRESSOR) 50 mg tablet TAKE 1 TABLET (50 MG TOTAL) BY MOUTH EVERY 12 (TWELVE) HOURS 5/12/22  Yes Daan Flor MD   olmesartan (BENICAR) 20 mg tablet Take 1 tablet (20mg) by mouth in the morning daily 10/27/22  Yes Dana Flor MD   [facility] 5 mg tablet Take 2 tablets (10 mg total) by mouth daily 10/27/22  Yes Dana Flor MD   pantoprazole (PROTONIX) 40 mg tablet TAKE 1 TABLET BY MOUTH TWICE A DAY 10/30/22  Yes Tino Mckinney PA-C   pramipexole (MIRAPEX) 0 25 mg tablet Take 1 tablet (0 25 mg total) by mouth daily at bedtime 11/22/22  Yes Colletta Keto, CRNP   Probiotic Product (PROBIOTIC PO) Take 1 capsule by mouth 2 (two) times a day   Yes Historical Provider, MD   torsemide (DEMADEX) 20 mg tablet TAKE 1 TABLET BY MOUTH EVERY DAY 7/25/22  Yes Dana Flor MD   Vitamin D, Ergocalciferol, 2000 units CAPS Take 2,000 Units by mouth daily    Yes Historical Provider, MD   azelastine (ASTELIN) 0 1 % nasal spray 1 spray into each nostril 2 (two) times a day Use in each nostril as directed 9/20/22   Teodoro Solomon DO   BIOTIN PO Take by mouth  Patient not taking: Reported on 9/23/2022    Historical Provider, MD   calcium carbonate (TUMS) 500 mg chewable tablet Chew 1 tablet 2 (two) times a day  Patient not taking: Reported on 9/23/2022    Historical Provider, MD iMchael Ga Oil (Omega-3) 500 MG CAPS Take 1,600 mg by mouth daily  Patient not taking: Reported on 1/3/2023 4/6/22 4/11/23  Historical Provider, MD   rosuvastatin (CRESTOR) 20 MG tablet Take 1 tablet (20 mg total) by mouth daily 4/1/22 10/20/22  Blossom Krause DO   sucralfate (CARAFATE) 1 g tablet Take 1 tablet (1 g total) by mouth 4 (four) times a day for 10 days Crush tablet and dissolve in [address] water to make a sluree  Patient not taking: No sig reported 9/10/22 9/20/22  MILTON Drake     I have reviewed home medications with patient personally  And with her     Allergies:    Allergies   Allergen Reactions   • Sulfamethoxazole-Trimethoprim Other (See Comments)     Tongue swelling   • Ciprofloxacin Hives and Itching       Social History:  Marital Status: /Civil Union   Occupation:   Patient Pre-hospital Living Situation: Home  Patient Pre-hospital Level of Mobility: walks  Patient Pre-hospital Diet Restrictions: Diabetic  Substance Use History:   Social History     Substance and Sexual Activity   Alcohol Use Yes    Comment: 1 or 2 a year     Social History     Tobacco Use   Smoking Status Former   • Packs/day: 2 00   • Years: 10 00   • Pack years: 20 00   • Types: Cigarettes   • Start date: 1968   • Quit date: 1976   • Years since quittin 0   Smokeless Tobacco Never   Tobacco Comments    Smoked 2 pack a day     Social History     Substance and Sexual Activity   Drug Use No       Family History:  Family History   Problem Relation Age of Onset   • Heart disease Mother    • Depression Mother    • Hypertension Mother    • COPD Mother    • Hearing loss Mother    • Anxiety disorder Mother    • Heart disease Father    • Lung cancer Father 79        Smoker    • Cancer Father         brain   • Alcohol abuse Father    • Dementia Father    • Hypertension Father    • Thyroid disease Father    • COPD Father    • Arthritis Father    • Brain cancer Father 76   • Hypertension Sister    • Diabetes Sister    • Heart disease Sister    • Thyroid disease Sister • Cancer Sister         Lympoma, lung   • Lung cancer Sister 78   • Anxiety disorder Sister    • Hypertension Brother    • Diabetes Brother    • Cancer Brother         Throat   • Dementia Brother    • Stroke Brother    • Hypertension Brother    • Heart disease Brother    • Diabetes Brother    • No Known Problems Son    • No Known Problems Son    • Brain cancer Paternal Aunt         unknown age   • Breast cancer Neg Hx        Physical Exam:     Vitals:   Blood Pressure: 162/77 (01/03/23 1330)  Pulse: 68 (01/03/23 1330)  Temperature: 98 3 °F (36 8 °C) (01/03/23 0820)  Temp Source: Oral (01/03/23 0820)  Respirations: 18 (01/03/23 1300)  Weight - Scale: 75 kg (165 lb 5 5 oz) (01/03/23 0440)  SpO2: 94 % (01/03/23 1330)    Physical Exam  Constitutional:       Appearance: Normal appearance  HENT:      Head: Normocephalic and atraumatic  Comments: Left temporal tenderness     Mouth/Throat:      Mouth: Mucous membranes are moist    Eyes:      General: No scleral icterus  Extraocular Movements: Extraocular movements intact  Pupils: Pupils are equal, round, and reactive to light  Cardiovascular:      Rate and Rhythm: Normal rate and regular rhythm  Pulses: Normal pulses  Heart sounds: Normal heart sounds  No murmur heard  Pulmonary:      Effort: Pulmonary effort is normal       Breath sounds: Normal breath sounds  No wheezing  Abdominal:      Palpations: Abdomen is soft  Tenderness: There is abdominal tenderness in the suprapubic area  Musculoskeletal:      Cervical back: Normal range of motion and neck supple  Left lower leg: No edema  Skin:     General: Skin is warm and dry  Neurological:      General: No focal deficit present  Mental Status: She is alert  She is disoriented        Comments: Oriented to person only   Psychiatric:         Mood and Affect: Mood normal          Behavior: Behavior normal          Additional Data:     Lab Results:  Results from last 7 days Lab Units 01/03/23  0443   WBC Thousand/uL 12 06*   HEMOGLOBIN g/dL 14 9   HEMATOCRIT % 44 7   PLATELETS Thousands/uL 274   NEUTROS PCT % 74   LYMPHS PCT % 17   MONOS PCT % 7   EOS PCT % 2     Results from last 7 days   Lab Units 01/03/23  0443   SODIUM mmol/L 139   POTASSIUM mmol/L 4 0   CHLORIDE mmol/L 100   CO2 mmol/L 29   BUN mg/dL 40*   CREATININE mg/dL 1 50*   ANION GAP mmol/L 10   CALCIUM mg/dL 10 6*   ALBUMIN g/dL 4 3   TOTAL BILIRUBIN mg/dL 0 49   ALK PHOS U/L 75   ALT U/L 140*   AST U/L 64*   GLUCOSE RANDOM mg/dL 78                 Results from last 7 days   Lab Units 01/03/23  0805   LACTIC ACID mmol/L 0 7       Lines/Drains:  Invasive Devices     Peripheral Intravenous Line  Duration           Peripheral IV 01/03/23 Left Antecubital <1 day                    Imaging: Reviewed radiology reports from this admission including: CT head and CT cervical spine, and CT facial bones  CT facial bones without contrast   Final Result by Ron Sarkar DO (01/03 1039)      Normal noncontrast CT of the facial bones  Workstation performed: [id number]         CT head without contrast   Final Result by Janette Aleman MD (01/03 9557)      No acute intracranial abnormality  Mild microangiopathic changes are similar to the prior study  Workstation performed: [id number]         CT spine cervical without contrast   Final Result by Janette Aleman MD (01/03 9163)      No acute cervical spine fracture or traumatic malalignment  Workstation performed: [id number]             EKG and Other Studies Reviewed on Admission:   · EKG: NSR  HR 66     ** Please Note: This note has been constructed using a voice recognition system   **

## 2023-01-03 NOTE — SEPSIS NOTE
Sepsis Note   Heather Sutton 67 y o  female MRN: [id number]  Unit/Bed#: ED-08 Encounter: [id number]       qSOFA     Row Name 01/03/23 1314 01/03/23 0500 01/03/23 0454 01/03/23 0440       Altered mental status GCS < 15 -- -- 1 --     Respiratory Rate > / =22 0 0 -- 0     Systolic BP < / =120 0 0 -- 0     Q Sofa Score 1 1 1 0                Initial Sepsis Screening     Row Name 01/03/23 3047                Is the patient's history suggestive of a new or worsening infection? Yes (Proceed)  -MS        Suspected source of infection suspect infection, source unknown  -MS        Are two or more of the following signs & symptoms of infection both present and new to the patient? No  -MS        Indicate SIRS criteria Leukocytosis (WBC > 42334 IJL)  -MS        If the answer is yes to both questions, suspicion of sepsis is present --        If severe sepsis is present AND tissue hypoperfusion perists in the hour after fluid resuscitation or lactate > 4, the patient meets criteria for SEPTIC SHOCK --        Are any of the following organ dysfunction criteria present within 6 hours of suspected infection and SIRS criteria that are NOT considered to be chronic conditions? No  -MS        Organ dysfunction --        Date of presentation of severe sepsis --        Time of presentation of severe sepsis --        Tissue hypoperfusion persists in the hour after crystalloid fluid administration, evidenced, by either: --        Was hypotension present within one hour of the conclusion of crystalloid fluid administration?  No  -MS        Date of presentation of septic shock --        Time of presentation of septic shock --              User Key  (r) = Recorded By, (t) = Taken By, (c) = Cosigned By    [address] Name Provider Alfredo Harrington MD Resident

## 2023-01-03 NOTE — ASSESSMENT & PLAN NOTE
Lab Results   Component Value Date    HGBA1C 6 7 (H) 11/04/2022       Recent Labs     01/06/23  1119 01/06/23  1553 01/06/23  2123 01/07/23  0756   POCGLU 131 131 115 85       Blood Sugar Average: Last 72 hrs:  (P) [phone]       - Medical management per primary team

## 2023-01-03 NOTE — ED NOTES
Warm blankets applied, lights dimmed, call bell within reach     JAYSHREE Villavicencio  01/03/23 3600

## 2023-01-04 PROBLEM — K21.9 GERD (GASTROESOPHAGEAL REFLUX DISEASE): Status: RESOLVED | Noted: 2020-07-14 | Resolved: 2023-01-04

## 2023-01-04 LAB
ANION GAP SERPL CALCULATED.3IONS-SCNC: 8 MMOL/L (ref 4–13)
BASOPHILS # BLD AUTO: 0.04 THOUSANDS/ÂΜL (ref 0–0.1)
BASOPHILS NFR BLD AUTO: 0 % (ref 0–1)
BUN SERPL-MCNC: 58 MG/DL (ref 5–25)
CALCIUM SERPL-MCNC: 9.1 MG/DL (ref 8.4–10.2)
CHLORIDE SERPL-SCNC: 99 MMOL/L (ref 96–108)
CO2 SERPL-SCNC: 28 MMOL/L (ref 21–32)
CREAT SERPL-MCNC: 1.88 MG/DL (ref 0.6–1.3)
EOSINOPHIL # BLD AUTO: 0.01 THOUSAND/ÂΜL (ref 0–0.61)
EOSINOPHIL NFR BLD AUTO: 0 % (ref 0–6)
ERYTHROCYTE [DISTWIDTH] IN BLOOD BY AUTOMATED COUNT: 15 % (ref 11.6–15.1)
GFR SERPL CREATININE-BSD FRML MDRD: 26 ML/MIN/1.73SQ M
GLUCOSE SERPL-MCNC: 167 MG/DL (ref 65–140)
GLUCOSE SERPL-MCNC: 176 MG/DL (ref 65–140)
GLUCOSE SERPL-MCNC: 176 MG/DL (ref 65–140)
GLUCOSE SERPL-MCNC: 280 MG/DL (ref 65–140)
GLUCOSE SERPL-MCNC: 328 MG/DL (ref 65–140)
GLUCOSE SERPL-MCNC: 397 MG/DL (ref 65–140)
HCT VFR BLD AUTO: 38 % (ref 34.8–46.1)
HGB BLD-MCNC: 12.5 G/DL (ref 11.5–15.4)
IMM GRANULOCYTES # BLD AUTO: 0.06 THOUSAND/UL (ref 0–0.2)
IMM GRANULOCYTES NFR BLD AUTO: 0 % (ref 0–2)
LYMPHOCYTES # BLD AUTO: 1.58 THOUSANDS/ÂΜL (ref 0.6–4.47)
LYMPHOCYTES NFR BLD AUTO: 12 % (ref 14–44)
MCH RBC QN AUTO: 28.8 PG (ref 26.8–34.3)
MCHC RBC AUTO-ENTMCNC: 32.9 G/DL (ref 31.4–37.4)
MCV RBC AUTO: 88 FL (ref 82–98)
MONOCYTES # BLD AUTO: 0.9 THOUSAND/ÂΜL (ref 0.17–1.22)
MONOCYTES NFR BLD AUTO: 7 % (ref 4–12)
NEUTROPHILS # BLD AUTO: 11.15 THOUSANDS/ÂΜL (ref 1.85–7.62)
NEUTS SEG NFR BLD AUTO: 81 % (ref 43–75)
NRBC BLD AUTO-RTO: 0 /100 WBCS
PLATELET # BLD AUTO: 231 THOUSANDS/UL (ref 149–390)
PMV BLD AUTO: 11 FL (ref 8.9–12.7)
POTASSIUM SERPL-SCNC: 3.7 MMOL/L (ref 3.5–5.3)
RBC # BLD AUTO: 4.34 MILLION/UL (ref 3.81–5.12)
SODIUM SERPL-SCNC: 135 MMOL/L (ref 135–147)
TSH SERPL DL<=0.05 MIU/L-ACNC: 1.88 UIU/ML (ref 0.45–4.5)
WBC # BLD AUTO: 13.74 THOUSAND/UL (ref 4.31–10.16)

## 2023-01-04 RX ORDER — INSULIN LISPRO 100 [IU]/ML
10 INJECTION, SOLUTION INTRAVENOUS; SUBCUTANEOUS ONCE
Status: COMPLETED | OUTPATIENT
Start: 2023-01-04 | End: 2023-01-04

## 2023-01-04 RX ORDER — INSULIN LISPRO 100 [IU]/ML
16 INJECTION, SOLUTION INTRAVENOUS; SUBCUTANEOUS
Status: DISCONTINUED | OUTPATIENT
Start: 2023-01-05 | End: 2023-01-07 | Stop reason: HOSPADM

## 2023-01-04 RX ORDER — INSULIN LISPRO 100 [IU]/ML
25 INJECTION, SOLUTION INTRAVENOUS; SUBCUTANEOUS
Status: DISCONTINUED | OUTPATIENT
Start: 2023-01-04 | End: 2023-01-07 | Stop reason: HOSPADM

## 2023-01-04 RX ORDER — INSULIN LISPRO 100 [IU]/ML
16 INJECTION, SOLUTION INTRAVENOUS; SUBCUTANEOUS
Status: DISCONTINUED | OUTPATIENT
Start: 2023-01-05 | End: 2023-01-04

## 2023-01-04 RX ORDER — INSULIN LISPRO 100 [IU]/ML
16 INJECTION, SOLUTION INTRAVENOUS; SUBCUTANEOUS
Status: DISCONTINUED | OUTPATIENT
Start: 2023-01-04 | End: 2023-01-07

## 2023-01-04 RX ADMIN — INSULIN LISPRO 12 UNITS: 100 INJECTION, SOLUTION INTRAVENOUS; SUBCUTANEOUS at 09:49

## 2023-01-04 RX ADMIN — CEFEPIME HYDROCHLORIDE 1000 MG: 1 INJECTION, SOLUTION INTRAVENOUS at 20:56

## 2023-01-04 RX ADMIN — ACETAMINOPHEN 650 MG: 325 TABLET, FILM COATED ORAL at 18:05

## 2023-01-04 RX ADMIN — GABAPENTIN 300 MG: 300 CAPSULE ORAL at 21:03

## 2023-01-04 RX ADMIN — INSULIN LISPRO 10 UNITS: 100 INJECTION, SOLUTION INTRAVENOUS; SUBCUTANEOUS at 20:57

## 2023-01-04 RX ADMIN — LEVOTHYROXINE SODIUM 112 MCG: 112 TABLET ORAL at 06:13

## 2023-01-04 RX ADMIN — Medication 250 MG: at 09:01

## 2023-01-04 RX ADMIN — AMLODIPINE BESYLATE 5 MG: 5 TABLET ORAL at 09:01

## 2023-01-04 RX ADMIN — FAMOTIDINE 20 MG: 20 TABLET, FILM COATED ORAL at 21:03

## 2023-01-04 RX ADMIN — CALCIUM 1 TABLET: 500 TABLET ORAL at 09:01

## 2023-01-04 RX ADMIN — PRAMIPEXOLE DIHYDROCHLORIDE 0.25 MG: 0.25 TABLET ORAL at 21:03

## 2023-01-04 RX ADMIN — INSULIN LISPRO 20 UNITS: 100 INJECTION, SOLUTION INTRAVENOUS; SUBCUTANEOUS at 17:51

## 2023-01-04 RX ADMIN — METOPROLOL TARTRATE 50 MG: 50 TABLET, FILM COATED ORAL at 09:01

## 2023-01-04 RX ADMIN — LOSARTAN POTASSIUM 50 MG: 50 TABLET, FILM COATED ORAL at 09:00

## 2023-01-04 RX ADMIN — TORSEMIDE 20 MG: 20 TABLET ORAL at 09:01

## 2023-01-04 RX ADMIN — CEFEPIME HYDROCHLORIDE 1000 MG: 1 INJECTION, SOLUTION INTRAVENOUS at 09:52

## 2023-01-04 RX ADMIN — LOSARTAN POTASSIUM 25 MG: 25 TABLET, FILM COATED ORAL at 09:01

## 2023-01-04 RX ADMIN — PANTOPRAZOLE SODIUM 40 MG: 40 TABLET, DELAYED RELEASE ORAL at 17:52

## 2023-01-04 RX ADMIN — ENOXAPARIN SODIUM 30 MG: 30 INJECTION SUBCUTANEOUS at 09:02

## 2023-01-04 RX ADMIN — INSULIN LISPRO 1 UNITS: 100 INJECTION, SOLUTION INTRAVENOUS; SUBCUTANEOUS at 09:49

## 2023-01-04 RX ADMIN — INSULIN LISPRO 16 UNITS: 100 INJECTION, SOLUTION INTRAVENOUS; SUBCUTANEOUS at 12:48

## 2023-01-04 RX ADMIN — INSULIN LISPRO 3 UNITS: 100 INJECTION, SOLUTION INTRAVENOUS; SUBCUTANEOUS at 12:47

## 2023-01-04 RX ADMIN — Medication 250 MG: at 20:57

## 2023-01-04 RX ADMIN — INSULIN DETEMIR 40 UNITS: 100 INJECTION, SOLUTION SUBCUTANEOUS at 17:52

## 2023-01-04 RX ADMIN — SODIUM CHLORIDE 500 MG: 0.9 INJECTION, SOLUTION INTRAVENOUS at 10:54

## 2023-01-04 RX ADMIN — ATORVASTATIN CALCIUM 40 MG: 40 TABLET, FILM COATED ORAL at 17:52

## 2023-01-04 RX ADMIN — DULOXETINE HYDROCHLORIDE 60 MG: 60 CAPSULE, DELAYED RELEASE ORAL at 09:01

## 2023-01-04 RX ADMIN — PANTOPRAZOLE SODIUM 40 MG: 40 TABLET, DELAYED RELEASE ORAL at 09:01

## 2023-01-04 RX ADMIN — CHOLECALCIFEROL TAB 10 MCG (400 UNIT) 400 UNITS: 10 TAB at 09:01

## 2023-01-04 RX ADMIN — ACETAMINOPHEN 650 MG: 325 TABLET, FILM COATED ORAL at 10:57

## 2023-01-04 RX ADMIN — METOPROLOL TARTRATE 50 MG: 50 TABLET, FILM COATED ORAL at 20:57

## 2023-01-04 NOTE — ED ATTENDING ATTESTATION
1/3/2023  IJacques MD, saw and evaluated the patient  I have discussed the patient with the resident/non-physician practitioner and agree with the resident's/non-physician practitioner's findings, Plan of Care, and MDM as documented in the resident's/non-physician practitioner's note, except where noted  All available labs and Radiology studies were reviewed  I was present for key portions of any procedure(s) performed by the resident/non-physician practitioner and I was immediately available to provide assistance  At this point I agree with the current assessment done in the Emergency Department  I have conducted an independent evaluation of this patient a history and physical is as follows:    Patient is a 59-year-old female presenting to emergency department with headache and altered mental status  No vision changes  No jaw pain  Pain is located over the left temporal area      Agree with checking ESR to evaluate for temporal arteritis, CT head for altered mental status, check urine for infection, admission to hospital      ED Course         Critical Care Time  Procedures

## 2023-01-04 NOTE — ASSESSMENT & PLAN NOTE
Lab Results   Component Value Date    HGBA1C 6 7 (H) 11/04/2022       Recent Labs     01/03/23  2216 01/04/23  0723 01/04/23  1142   POCGLU 211* 176* 280*       Blood Sugar Average: Last 72 hrs:  (P) 222 [id number]'s home diabetes regimen is NovoLog with meals  16 units with breakfast, 16 units with lunch, and 25 units with dinner  Levemir 50 units at bedtime  Plan:  · Initially had hypoglycemia on admission however subsequently has hyperglycemia on IV steroids  Will place back on home regimen of Humalog 16 units with breakfast, 16 units with lunch, 25 units with dinner  · Continue with detemir 40 units at bedtime  Patient will be n p o  past midnight for temporal artery biopsy tomorrow  · Sliding scale insulin  · Carb controlled diet  · May need further titration of insulin based on blood glucose control

## 2023-01-04 NOTE — PROGRESS NOTES
[facility]  Progress Note Beauty Deck 1950, 67 y o  female MRN: [id number]  Unit/Bed#: ED-08 Encounter: [id number]  Primary Care Provider: Nikhil Abarca MD   Date and time admitted to hospital: 1/3/2023  4:32 AM    * Headache  Assessment & Plan  Patient presented with headache starting approximately 5 days ago  She came to the emergency department on 1/1/23 and was discharged with Fioricet with some improvement in her headache  She returns today with worsening headache with associated left temporal tenderness  He also has some associated confusion  She was found to have mildly elevated ESR of 56 and out of concern for GCA she was given prednisone in the emergency department  Patient also reports some shoulder and low back pain but may be secondary to osteoarthritis  Plan:  · Continue with high-dose IV Solu-Medrol for 3 days followed by prednisone  · Neurology consult appreciate recommendations  · ESR and CRP are elevated raising concern for GCA  · Seen by vascular surgery and will get temporal artery ultrasound in a m  followed by temporal artery biopsy at 1:45 PM on 1/ 5  We will keep patient n p o  past midnight  UTI (urinary tract infection)  Assessment & Plan  Patient with suprapubic pain and UA showing normal bacteria and 4-10 WBCs  Patient has history of UTI with Enterobacter cloacae  Patient also has mildly elevated WBC at 12 and some confusion oriented to person only  Given suprapubic tenderness and leukocytosis with positive UA, will treat for suspected UTI  Previous cultures show susceptibility to cefepime  Plan:  · Cefepime 1 g every 12 hours  · Follow-up urine culture  · Patient does have intermittent urinary retention and recurrent UTIs  Has followed up with urologist as an outpatient and was recommended Estrace cream 3 times weekly which she was not using consistently    Will recommend close outpatient urology follow-up for further management  HTN (hypertension)  Assessment & Plan  Patient with hypertension, continue with home regimen  Torsemide 20 mg, metoprolol 50 mg twice daily, amlodipine 5 mg, 20 mg olmesartan a m  and 10 mg p m  (formulary replacement with losartan)    Plan:  · Continue torsemide  · Continue metoprolol  · Continue amlodipine  Losartan 50 mg a m , 25 mg p m  Blood pressure is well controlled  Type 2 diabetes mellitus with diabetic chronic kidney disease [facility])  Assessment & Plan  Lab Results   Component Value Date    HGBA1C 6 7 (H) 11/04/2022       Recent Labs     01/03/23  2216 01/04/23  0723 01/04/23  1142   POCGLU 211* 176* 280*       Blood Sugar Average: Last 72 hrs:  (P) 222 [id number]'s home diabetes regimen is NovoLog with meals  16 units with breakfast, 16 units with lunch, and 25 units with dinner  Levemir 50 units at bedtime  Plan:  · Initially had hypoglycemia on admission however subsequently has hyperglycemia on IV steroids  Will place back on home regimen of Humalog 16 units with breakfast, 16 units with lunch, 25 units with dinner  · Continue with detemir 40 units at bedtime  Patient will be n p o  past midnight for temporal artery biopsy tomorrow  · Sliding scale insulin  · Carb controlled diet  · May need further titration of insulin based on blood glucose control  VTE Pharmacologic Prophylaxis: VTE Score: 3 Moderate Risk (Score 3-4) - Pharmacological DVT Prophylaxis Ordered: enoxaparin (Lovenox)  Patient Centered Rounds: I performed bedside rounds with nursing staff today  Discussions with Specialists or Other Care Team Provider: Discussed with neurology  Education and Discussions with Family / Patient: Patient declined call to   Time Spent for Care: 30 minutes  More than 50% of total time spent on counseling and coordination of care as described above      Current Length of Stay: 0 day(s)  Current Patient Status: Observation   Certification Statement: The patient will continue to require additional inpatient hospital stay due to Ongoing work-up for headache  Discharge Plan: Anticipate discharge in 24-48 hrs to home  Code Status: Level 3 - DNAR and DNI    Subjective:   Patient seen and examined at bedside  Still complains of temporal headache  Denies any other complaints  No acute events overnight  Objective:     Vitals:   No data recorded  HR:  [58-81] 58  Resp:  [17-20] 18  BP: (139-163)/(69-82) 163/82  SpO2:  [95 %-98 %] 96 %  Body mass index is 30 24 kg/m²  Input and Output Summary (last 24 hours): Intake/Output Summary (Last 24 hours) at 1/4/2023 1409  Last data filed at 1/3/2023 2253  Gross per 24 hour   Intake 100 ml   Output --   Net 100 ml       Physical Exam:   Physical Exam  Constitutional:       General: She is not in acute distress  Appearance: She is obese  HENT:      Head: Normocephalic  Right Ear: Tympanic membrane normal       Mouth/Throat:      Mouth: Mucous membranes are moist    Eyes:      Extraocular Movements: Extraocular movements intact  Pupils: Pupils are equal, round, and reactive to light  Cardiovascular:      Rate and Rhythm: Normal rate  Pulses: Normal pulses  Pulmonary:      Effort: Pulmonary effort is normal    Abdominal:      General: Abdomen is flat  Bowel sounds are normal       Palpations: Abdomen is soft  Musculoskeletal:      Cervical back: Neck supple  Right lower leg: No edema  Left lower leg: No edema  Neurological:      General: No focal deficit present  Mental Status: She is alert and oriented to person, place, and time  Mental status is at baseline  Cranial Nerves: No cranial nerve deficit  Sensory: No sensory deficit  Motor: No weakness        Coordination: Coordination normal       Gait: Gait normal       Comments: Temporal tenderness   Psychiatric:         Mood and Affect: Mood normal        Additional Data:     Labs:  Results from last 7 days   Lab Units 01/04/23  0739   WBC Thousand/uL 13 74*   HEMOGLOBIN g/dL 12 5   HEMATOCRIT % 38 0   PLATELETS Thousands/uL 231   NEUTROS PCT % 81*   LYMPHS PCT % 12*   MONOS PCT % 7   EOS PCT % 0     Results from last 7 days   Lab Units 01/04/23  0739 01/03/23  0443   SODIUM mmol/L 135 139   POTASSIUM mmol/L 3 7 4 0   CHLORIDE mmol/L 99 100   CO2 mmol/L 28 29   BUN mg/dL 58* 40*   CREATININE mg/dL 1 88* 1 50*   ANION GAP mmol/L 8 10   CALCIUM mg/dL 9 1 10 6*   ALBUMIN g/dL  --  4 3   TOTAL BILIRUBIN mg/dL  --  0 49   ALK PHOS U/L  --  75   ALT U/L  --  140*   AST U/L  --  64*   GLUCOSE RANDOM mg/dL 167* 78         Results from last 7 days   Lab Units 01/04/23  1142 01/04/23  0723 01/03/23  2216   POC GLUCOSE mg/dl 280* 176* 211*         Results from last 7 days   Lab Units 01/03/23  0805   LACTIC ACID mmol/L 0 7       Lines/Drains:  Invasive Devices     Peripheral Intravenous Line  Duration           Peripheral IV 01/03/23 Distal;Left;Ventral (anterior) Forearm <1 day                      Imaging: No pertinent imaging reviewed  Recent Cultures (last 7 days):   Results from last 7 days   Lab Units 01/03/23  0812 01/03/23  0805   BLOOD CULTURE  No Growth at 24 hrs  No Growth at 24 hrs         Last 24 Hours Medication List:   Current Facility-Administered Medications   Medication Dose Route Frequency Provider Last Rate   • acetaminophen  650 mg Oral Q6H PRN aNs Banda MD     • albuterol  2 puff Inhalation Q6H PRN Nas Banda MD     • amLODIPine  5 mg Oral Daily Nas Banda MD     • atorvastatin  40 mg Oral Daily With Gabe Braun MD     • calcium carbonate  1 tablet Oral Daily With Breakfast Nas Banda MD     • cefepime  1,000 mg Intravenous Q12H Nas Banda MD 1,000 mg (01/04/23 7827)   • cholecalciferol  400 Units Oral Daily Nas Banda MD     • DULoxetine  60 mg Oral Daily Nas Banda MD     • enoxaparin  30 mg Subcutaneous Daily Christo Jones MD     • estradiol  1 g Vaginal Once per day on Mon Wed Fri Christo Jones MD     • famotidine  20 mg Oral HS Christo Jones MD     • fluorouracil   Topical BID Christo Jones MD     • gabapentin  300 mg Oral HS Christo Jones MD     • insulin detemir  40 Units Subcutaneous QPM Christo Jonse MD     • insulin lispro  1-5 Units Subcutaneous TID [facility] Christo Jones MD     • [START ON 1/5/2023] insulin lispro  16 Units Subcutaneous Daily With Breakfast Judi Marr MD     • insulin lispro  16 Units Subcutaneous Daily With Lunch Judi Marr MD     • insulin lispro  25 Units Subcutaneous Daily With Kevin Watters MD     • levothyroxine  112 mcg Oral Early Morning Christo Jones MD     • losartan  25 mg Oral Daily Christo Jones MD     • losartan  50 mg Oral Daily Tena Hamilton MD     • methylPREDNISolone sodium succinate  500 mg Intravenous Daily Tena Hamilton  mg (01/04/23 1054)   • metoprolol tartrate  50 mg Oral Q12H [address] Christo Jones MD     • multivitamin stress formula  1 tablet Oral Daily Christo Jones MD     • pantoprazole  40 mg Oral BID Christo Jones MD     • pramipexole  0 25 mg Oral HS Christo Jones MD     • saccharomyces boulardii  250 mg Oral BID Christo Jones MD     • torsemide  20 mg Oral Daily Christo Jones MD          Today, Patient Was Seen By: Judi Marr MD    **Please Note: This note may have been constructed using a voice recognition system  **

## 2023-01-04 NOTE — ASSESSMENT & PLAN NOTE
Patient with hypertension, continue with home regimen  Torsemide 20 mg, metoprolol 50 mg twice daily, amlodipine 5 mg, 20 mg olmesartan a m  and 10 mg p m  (formulary replacement with losartan)    Plan:  · Continue torsemide  · Continue metoprolol  · Continue amlodipine  Losartan 50 mg a m , 25 mg p m  Blood pressure is well controlled

## 2023-01-04 NOTE — CONSULTS
Consultation -General Surgery  Jaime Mcadams 67 y o  female MRN: [id number]  Unit/Bed#: ED-08 Encounter: [id number]            ASSESSMENT:  Patient is a 67year old female with PMH of type2 diabetes, HTN, with complaints of headache x 6 days with concern of possible giant cell arteritis  ESR - 56  CRP - 3 3    Plan:  - discussed with Dr Nic Alfredo  - temporal artery duplex scheduled for tomorrow  - anticipate temporal artery biopsy 1/5/23 1:45 pm with Dr Cristina Chappell      Reason for Consult / Principal Problem:    HPI:  Patient is a 67year old female with PMH of type2 diabetes, HTN, with complaints of headache x 6 days  Headache started as constant and last night started to wax and wane  Pain is located over temporal area and radiates to midline of head  No visual changes  She said the pain started after using a chemotherapy ointment on her forehead for treatment of her BCC  She was started on solu-medrol last night  Patient denies previous headaches  Review of Systems   Constitutional: Negative for fever  Respiratory: Negative for shortness of breath  Cardiovascular: Negative for chest pain  Gastrointestinal: Negative for abdominal pain  Neurological: Positive for headaches  All other systems reviewed and are negative        Historical Information   Past Medical History:   Diagnosis Date   • Allergic    • Allergic rhinitis    • Anesthesia     pt reports "had to use double lumen endo tube for hiatal hernia repair/so surgeon could get to where he needed to work"   • Arthritis    • Aspiration into airway    • Basal cell carcinoma 2007    left cheek    • BCC (basal cell carcinoma) 05/27/2021    Left Nasal tip   • Cancer (HCC)     squamous cell cancer on forhead   • Cancer ([address] )     basal cell on nose    • Chronic kidney disease 2000, 2018    Stones, kidney disease stage 4   • Chronic pain disorder     bilat feet and joint pain on occas   • Colon polyp    • Constipation    • COPD (chronic obstructive pulmonary disease) ([place] [address] )    • COVID-19 08/2021    recovered at home/did receive monoclonal infusion   • Dental bridge present    • Depression    • Diabetes mellitus ([place] [address] )     Type 2   • Diabetic neuropathy ([place] [address] )    • Disease of thyroid gland    • Family history of thyroid problem    • Fatty liver    • GERD (gastroesophageal reflux disease)    • Heart burn    • Hiatal hernia    • History of pneumonia    • Hyperlipidemia    • Hyperplasia, parathyroid ([place] [address] )    • Hypertension    • Kidney problem    • Kidney stone    • Memory loss Julu2 2020   • Motion sickness    • Motion sickness    • Neck pain    • Obesity 1978   • Obesity (BMI 30 0-34  9)    • Pollen allergies    • RLS (restless legs syndrome)    • SCC (squamous cell carcinoma) 05/04/2021    left mid forehead   • Seasonal allergies    • Sleep apnea     has inspire   • Squamous cell skin cancer 2007    left cheek    • Swollen ankles    • 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, left     great toe fusion   • Urinary incontinence    • Urinary tract infection 03/28/2022   • Wears glasses      Past Surgical History:   Procedure Laterality Date   • ARTHROSCOPY KNEE     • BREAST BIOPSY      stereotactic-benign   • BREAST BIOPSY      stereo-benign   • BREAST EXCISIONAL BIOPSY      unknown date-benign   • BREAST EXCISIONAL BIOPSY      unknown date-benign   • BREAST EXCISIONAL BIOPSY      unknown date-benign   • BREAST EXCISIONAL BIOPSY      unknown age-benign   • CARDIAC CATHETERIZATION     • CHOLECYSTECTOMY     • COLONOSCOPY     • EXAMINATION UNDER ANESTHESIA N/A 06/24/2021    Procedure: EXAM UNDER ANESTHESIA (EUA), DISE;  Surgeon: Del Pearson MD;  Location[facility];  Service: ENT   • HERNIA REPAIR     • HIATAL HERNIA REPAIR     • KNEE SURGERY      Torn maniscus lap surg   • LIPOSUCTION     • LYMPH NODE BIOPSY     • MOHS SURGERY  05/20/2021    left mid forehead-Gautam   • MOHS SURGERY Left 05/27/2021    Left nasal tip- gautam    • NV OPEN IMPLANTATION CRANIAL NERVE VASQUEZ & PULSE GEN N/A 11/10/2021    Procedure: INSERTION UPPER AIRWAY STIMULATOR, INSPIRE IMPLANT;  Surgeon: Kristal Horton MD;  Location: [facility];  Service: ENT   • CT UNLISTED PROCEDURE FOOT/TOES Right 2022    Procedure: 1st metatarsal phalangeal joint fusion;  Surgeon: Amina Foote DPM;  Location: [facility];  Service: Podiatry   • REDUCTION MAMMAPLASTY Bilateral 2000   • REDUCTION MAMMAPLASTY     • SKIN BIOPSY     • SKIN CANCER EXCISION      squamous cell carcinoma    • SKIN CANCER EXCISION      basal cell carcinoma   • SQUAMOUS CELL CARCINOMA EXCISION     • TOE SURGERY Right 2022    Right Great Toe Fusion   • TONSILLECTOMY     • TUBAL LIGATION     • UPPER GASTROINTESTINAL ENDOSCOPY     • US GUIDED BREAST BIOPSY RIGHT COMPLETE Right 2022     Social History   Social History     Substance and Sexual Activity   Alcohol Use Yes    Comment: 1 or 2 a year     Social History     Substance and Sexual Activity   Drug Use No     Social History     Tobacco Use   Smoking Status Former   • Packs/day: 2 00   • Years: 10 00   • Pack years: 20 00   • Types: Cigarettes   • Start date: 1968   • Quit date: 1976   • Years since quittin 0   Smokeless Tobacco Never   Tobacco Comments    Smoked 2 pack a day     Family History   Problem Relation Age of Onset   • Heart disease Mother    • Depression Mother    • Hypertension Mother    • COPD Mother    • Hearing loss Mother    • Anxiety disorder Mother    • Heart disease Father    • Lung cancer Father 79        Smoker    • Cancer Father         brain   • Alcohol abuse Father    • Dementia Father    • Hypertension Father    • Thyroid disease Father    • COPD Father    • Arthritis Father    • Brain cancer Father 76   • Hypertension Sister    • Diabetes Sister    • Heart disease Sister    • Thyroid disease Sister    • Cancer Sister         Lympoma, lung   • Lung cancer Sister 78   • Anxiety disorder Sister    • Hypertension Brother    • Diabetes Brother    • Cancer Brother         Throat   • Dementia Brother    • Stroke Brother    • Hypertension Brother    • Heart disease Brother    • Diabetes Brother    • No Known Problems Son    • No Known Problems Son    • Brain cancer Paternal Aunt         unknown age   • Breast cancer Neg Hx        Meds/Allergies     (Not in a hospital admission)    Current Facility-Administered Medications   Medication Dose Route Frequency   • acetaminophen (TYLENOL) tablet 650 mg  650 mg Oral Q6H PRN   • albuterol (PROVENTIL HFA,VENTOLIN HFA) inhaler 2 puff  2 puff Inhalation Q6H PRN   • amLODIPine (NORVASC) tablet 5 mg  5 mg Oral Daily   • atorvastatin (LIPITOR) tablet 40 mg  40 mg Oral Daily With Dinner   • calcium carbonate (OYSTER SHELL,OSCAL) 500 mg tablet 1 tablet  1 tablet Oral Daily With Breakfast   • cefepime (MAXIPIME) IVPB (premix in dextrose) 1,000 mg 50 mL  1,000 mg Intravenous Q12H   • cholecalciferol (VITAMIN D3) tablet 400 Units  400 Units Oral Daily   • DULoxetine (CYMBALTA) delayed release capsule 60 mg  60 mg Oral Daily   • enoxaparin (LOVENOX) subcutaneous injection 30 mg  30 mg Subcutaneous Daily   • estradiol (ESTRACE) vaginal cream 1 g  1 g Vaginal Once per day on Mon Wed Fri   • famotidine (PEPCID) tablet 20 mg  20 mg Oral HS   • fluorouracil (EFUDEX) 5 % cream   Topical BID   • gabapentin (NEURONTIN) capsule 300 mg  300 mg Oral HS   • insulin detemir (LEVEMIR) subcutaneous injection 40 Units  40 Units Subcutaneous QPM   • insulin lispro (HumaLOG) 100 units/mL subcutaneous injection 1-5 Units  1-5 Units Subcutaneous TID AC   • insulin lispro (HumaLOG) 100 units/mL subcutaneous injection 12 Units  12 Units Subcutaneous Daily With Breakfast   • insulin lispro (HumaLOG) 100 units/mL subcutaneous injection 12 Units  12 Units Subcutaneous Daily With Lunch   • insulin lispro (HumaLOG) 100 units/mL subcutaneous injection 20 Units  20 Units Subcutaneous Daily With Dinner   • levothyroxine tablet 112 mcg  112 mcg Oral Early Morning   • losartan (COZAAR) tablet 25 mg  25 mg Oral Daily   • losartan (COZAAR) tablet 50 mg  50 mg Oral Daily   • methylPREDNISolone sodium succinate (Solu-MEDROL) 500 mg in sodium chloride 0 9 % 250 mL IVPB  500 mg Intravenous Daily   • metoprolol tartrate (LOPRESSOR) tablet 50 mg  50 mg Oral Q12H [address]   • multivitamin stress formula tablet 1 tablet  1 tablet Oral Daily   • pantoprazole (PROTONIX) EC tablet 40 mg  40 mg Oral BID   • pramipexole (MIRAPEX) tablet 0 25 mg  0 25 mg Oral HS   • saccharomyces boulardii (FLORASTOR) capsule 250 mg  250 mg Oral BID   • torsemide (DEMADEX) tablet 20 mg  20 mg Oral Daily       Allergies   Allergen Reactions   • Sulfamethoxazole-Trimethoprim Other (See Comments)     Tongue swelling   • Ciprofloxacin Hives and Itching       Objective     Blood pressure 163/82, pulse 67, temperature 98 3 °F (36 8 °C), temperature source Oral, resp  rate 18, weight 75 kg (165 lb 5 5 oz), SpO2 98 %  Intake/Output Summary (Last 24 hours) at 1/4/2023 0940  Last data filed at 1/3/2023 2253  Gross per 24 hour   Intake 600 ml   Output --   Net 600 ml       PHYSICAL EXAM  Physical Exam  Vitals reviewed  Constitutional:       Appearance: Normal appearance  HENT:      Head: Normocephalic and atraumatic  Right Ear: External ear normal       Left Ear: External ear normal       Nose: Nose normal       Mouth/Throat:      Mouth: Mucous membranes are moist    Eyes:      Extraocular Movements: Extraocular movements intact  Conjunctiva/sclera: Conjunctivae normal       Pupils: Pupils are equal, round, and reactive to light  Cardiovascular:      Rate and Rhythm: Normal rate and regular rhythm  Pulses: Normal pulses  Pulmonary:      Effort: Pulmonary effort is normal       Breath sounds: Normal breath sounds  Abdominal:      General: Bowel sounds are normal       Palpations: Abdomen is soft  Skin:     General: Skin is warm        Capillary Refill: Capillary refill takes less than 2 seconds  Neurological:      General: No focal deficit present  Mental Status: She is alert and oriented to person, place, and time  Cranial Nerves: No cranial nerve deficit  Psychiatric:         Mood and Affect: Mood normal          Behavior: Behavior normal          Thought Content: Thought content normal          Judgment: Judgment normal            Lab Results: I have personally reviewed pertinent lab results  Imaging: I have personally reviewed pertinent reports  Counseling / Coordination of Care  Total time spent today  20 minutes  Greater than 50% of total time was spent with the patient and / or family counseling and / or coordination of care           Daniel Aquino PA-C  1/4/2023 9:40 AM

## 2023-01-04 NOTE — ASSESSMENT & PLAN NOTE
Patient presented with headache starting approximately 5 days ago  She came to the emergency department on 1/1/23 and was discharged with Fioricet with some improvement in her headache  She returns today with worsening headache with associated left temporal tenderness  He also has some associated confusion  She was found to have mildly elevated ESR of 56 and out of concern for GCA she was given prednisone in the emergency department  Patient also reports some shoulder and low back pain but may be secondary to osteoarthritis  Plan:  · Continue with high-dose IV Solu-Medrol for 3 days followed by prednisone  · Neurology consult appreciate recommendations  · ESR and CRP are elevated raising concern for GCA  · Seen by vascular surgery and will get temporal artery ultrasound in a m  followed by temporal artery biopsy at 1:45 PM on 1/ 5  We will keep patient n p o  past midnight

## 2023-01-04 NOTE — PLAN OF CARE
Problem: NEUROSENSORY - ADULT  Goal: Achieves stable or improved neurological status  Description: INTERVENTIONS  - Monitor and report changes in neurological status  - Monitor vital signs such as temperature, blood pressure, glucose, and any other labs ordered   - Initiate measures to prevent increased intracranial pressure  - Monitor for seizure activity and implement precautions if appropriate      Outcome: Progressing  Goal: Achieves maximal functionality and self care  Description: INTERVENTIONS  - Monitor swallowing and airway patency with patient fatigue and changes in neurological status  - Encourage and assist patient to increase activity and self care     - Encourage visually impaired, hearing impaired and aphasic patients to use assistive/communication devices  Outcome: Progressing     Problem: Potential for Falls  Goal: Patient will remain free of falls  Description: INTERVENTIONS:  - Educate patient/family on patient safety including physical limitations  - Instruct patient to call for assistance with activity   - Consult OT/PT to assist with strengthening/mobility   - Keep Call bell within reach  - Keep bed low and locked with side rails adjusted as appropriate  - Keep care items and personal belongings within reach  - Initiate and maintain comfort rounds  - Make Fall Risk Sign visible to staff  - Apply yellow socks and bracelet for high fall risk patients  - Consider moving patient to room near nurses station  Outcome: Progressing     Problem: Prexisting or High Potential for Compromised Skin Integrity  Goal: Skin integrity is maintained or improved  Description: INTERVENTIONS:  - Identify patients at risk for skin breakdown  - Assess and monitor skin integrity  - Assess and monitor nutrition and hydration status  - Monitor labs   - Assess for incontinence   - Turn and reposition patient  - Assist with mobility/ambulation  - Relieve pressure over bony prominences  - Avoid friction and shearing  - Provide appropriate hygiene as needed including keeping skin clean and dry  - Evaluate need for skin moisturizer/barrier cream  - Collaborate with interdisciplinary team   - Patient/family teaching  - Consider wound care consult   Outcome: Progressing     Problem: MOBILITY - ADULT  Goal: Maintain or return to baseline ADL function  Description: INTERVENTIONS:  -  Assess patient's ability to carry out ADLs; assess patient's baseline for ADL function and identify physical deficits which impact ability to perform ADLs (bathing, care of mouth/teeth, toileting, grooming, dressing, etc )  - Assess/evaluate cause of self-care deficits   - Assess range of motion  - Assess patient's mobility; develop plan if impaired  - Assess patient's need for assistive devices and provide as appropriate  - Encourage maximum independence but intervene and supervise when necessary  - Involve family in performance of ADLs  - Assess for home care needs following discharge   - Consider OT consult to assist with ADL evaluation and planning for discharge  - Provide patient education as appropriate  Outcome: Progressing

## 2023-01-04 NOTE — CONSULTS
Duplicate consult order  Please see original neurology consult note completed by myself and Dr Pretty Sinclair on 1/3/23

## 2023-01-04 NOTE — PLAN OF CARE
Problem: OCCUPATIONAL THERAPY ADULT  Goal: Performs self-care activities at highest level of function for planned discharge setting  See evaluation for individualized goals  Description: Treatment Interventions: ADL retraining, Functional transfer training, Endurance training, Cognitive reorientation, Patient/family training, Compensatory technique education, Continued evaluation, Equipment evaluation/education, Energy conservation, Activityengagement          See flowsheet documentation for full assessment, interventions and recommendations  Note: Limitation: Decreased ADL status, Decreased Safe judgement during ADL, Decreased cognition, Decreased endurance, Decreased self-care trans, Decreased high-level ADLs  Prognosis: Good  Assessment: Pt is a 67 y o  female seen for OT evaluation s/p admit to [address] [place] [address] on 1/3/2023 w/UTI (urinary tract infection)  Orders received for OT evaluation and treatment  Pt identified during session via name and wristband  Comorbidities affecting patient at time of evaluation include: headache, diabetes, hypertension and hypothyroid  Personal factors affecting patient at time of evaluation include: limited insight into deficits, difficulty performing ADLs and difficulty performing IADLs  PTA, patient was independent with functional mobility without assistive device, independent with ADLS, requiring assist for IADLS, living with spouse and son in a 2 level home with 0 steps to enter and retired  The evaluation identifies the following performance deficits: impaired balance, decreased endurance, increased fall risk, new onset of impairment of functional mobility, decreased ADLS, decreased IADLS, pain, decreased activity tolerance, decreased safety awareness, decreased cognition and decreased strength, that result in activity limitations (as is outlined above in flowsheet)   Based on the OT evaluation outcomes, functional performance deficits, and assessment findings, pt has been identified as high complexity, because the patient presents with comorbidites that affect occupational performance and required significant modification of tasks or assistance with consideration of multiple treatment options  The patient's raw score on the AM-PAC Daily Activity inpatient short form is 21, standardized score is 44 27, greater than 39 4  From an OT standpoint, patients at this level may benefit from d/c to Home with home health rehabilitation  Pt will benefit from continued IPOT treatment to address above deficits and maximize level of independence with ADLs and functional mobility in the areas of: bathing/ shower, dressing, toilet hygiene, transfer to all surfaces and functional ambulation       OT Discharge Recommendation: Home with home health rehabilitation     Mela Pedersen OTR/L

## 2023-01-04 NOTE — ASSESSMENT & PLAN NOTE
Patient with suprapubic pain and UA showing normal bacteria and 4-10 WBCs  Patient has history of UTI with Enterobacter cloacae  Patient also has mildly elevated WBC at 12 and some confusion oriented to person only  Given suprapubic tenderness and leukocytosis with positive UA, will treat for suspected UTI  Previous cultures show susceptibility to cefepime  Plan:  · Cefepime 1 g every 12 hours  · Follow-up urine culture  · Patient does have intermittent urinary retention and recurrent UTIs  Has followed up with urologist as an outpatient and was recommended Estrace cream 3 times weekly which she was not using consistently  Will recommend close outpatient urology follow-up for further management

## 2023-01-04 NOTE — OCCUPATIONAL THERAPY NOTE
Occupational Therapy Evaluation      Shelly Owens    1/4/2023    Patient Active Problem List   Diagnosis    Stage 4 chronic kidney disease ([facility] )    Hypertensive chronic kidney disease with stage 1 through stage 4 chronic kidney disease, or unspecified chronic kidney disease    BARBARA (obstructive sleep apnea)    RLS (restless legs syndrome)    Persistent proteinuria    Type 2 diabetes mellitus with diabetic nephropathy, with long-term current use of insulin (HCC)    Pulmonary hypertension (HCC)    Dyspnea    Acute respiratory failure with hypoxia (HCC)    History of repair of hiatal hernia    ANDRA (acute kidney injury) ([facility] )    Dyslipidemia    Vitamin D deficiency    Anemia of chronic renal failure, stage 3 (moderate) ([facility])    Fibromyalgia    Type 2 diabetes mellitus with diabetic chronic kidney disease (HCC)    GERD (gastroesophageal reflux disease)    History of kidney stones    Urinary urgency    HTN (hypertension)    Ambulatory dysfunction    Memory changes    Gastroparesis diabeticorum (HCC)    B12 neuropathy (HCC)    Depression, recurrent (HCC)    Chronic respiratory failure with hypoxia (HCC)    Anemia in stage 4 chronic kidney disease (HCC)    Epigastric abdominal pain with severe diabetic gastroparesis, status post EGD/Botox injection, 5/14    COPD (chronic obstructive pulmonary disease) (HCC)    BMI 29 0-29 9,adult    Chronic constipation    History of colon polyps    COVID-19 virus infection    Other chest pain    Diabetic nephropathy associated with type 2 diabetes mellitus (HCC)    Abdominal pain    Abnormal thyroid blood test    Hypotension    Urinary retention    UTI (urinary tract infection)    S/P foot surgery    Urinary incontinence    Headache    Hypothyroid       Past Medical History:   Diagnosis Date    Allergic     Allergic rhinitis     Anesthesia     pt reports "had to use double lumen endo tube for hiatal hernia repair/so surgeon could get to where he needed to work"    Arthritis     Aspiration into airway     Basal cell carcinoma 2007    left cheek     BCC (basal cell carcinoma) 05/27/2021    Left Nasal tip    Cancer (HCC)     squamous cell cancer on forhead    Cancer (HCC)     basal cell on nose     Chronic kidney disease 2000, 2018    Stones, kidney disease stage 4    Chronic pain disorder     bilat feet and joint pain on occas    Colon polyp     Constipation     COPD (chronic obstructive pulmonary disease) ([facility] )     COVID-19 08/2021    recovered at home/did receive monoclonal infusion    Dental bridge present     Depression     Diabetes mellitus ([facility] )     Type 2    Diabetic neuropathy (HCC)     Disease of thyroid gland     Family history of thyroid problem     Fatty liver     GERD (gastroesophageal reflux disease)     Heart burn     Hiatal hernia     History of pneumonia     Hyperlipidemia     Hyperplasia, parathyroid ([facility] )     Hypertension     Kidney problem     Kidney stone     Memory loss Julu2 2020    Motion sickness     Motion sickness     Neck pain     Obesity 1978    Obesity (BMI 30 0-34  9)     Pollen allergies     RLS (restless legs syndrome)     SCC (squamous cell carcinoma) 05/04/2021    left mid forehead    Seasonal allergies     Sleep apnea     has inspire    Squamous cell skin cancer 2007    left cheek     Swollen ankles     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, left     great toe fusion    Urinary incontinence     Urinary tract infection 03/28/2022    Wears glasses        Past Surgical History:   Procedure Laterality Date    ARTHROSCOPY KNEE      BREAST BIOPSY      stereotactic-benign    BREAST BIOPSY      stereo-benign    BREAST EXCISIONAL BIOPSY      unknown date-benign    BREAST EXCISIONAL BIOPSY      unknown date-benign    BREAST EXCISIONAL BIOPSY      unknown date-benign    BREAST EXCISIONAL BIOPSY      unknown age-benign    CARDIAC CATHETERIZATION      CHOLECYSTECTOMY      COLONOSCOPY      EXAMINATION UNDER ANESTHESIA N/A 06/24/2021    Procedure: EXAM UNDER ANESTHESIA (EUA), DISE; Surgeon: Angelica Reyes MD;  [facility];  Service: ENT    HERNIA REPAIR      HIATAL HERNIA REPAIR      KNEE SURGERY      Torn maniscus lap surg    LIPOSUCTION      LYMPH NODE BIOPSY      MOHS SURGERY  05/20/2021    left mid forehead-Gautam    MOHS SURGERY Left 05/27/2021    Left nasal tip- gautam     ME OPEN IMPLANTATION CRANIAL NERVE VASQUEZ & PULSE GEN N/A 11/10/2021    Procedure: INSERTION UPPER AIRWAY STIMULATOR, INSPIRE IMPLANT;  Surgeon: Angelica Reyes MD;  Location: [facility];  Service: ENT    ME UNLISTED PROCEDURE FOOT/TOES Right 07/19/2022    Procedure: 1st metatarsal phalangeal joint fusion;  Surgeon: Delma Arceo DPM;  Location: [facility];  Service: [address] [place] [address] [address] Bilateral 2000    REDUCTION MAMMAPLASTY      SKIN BIOPSY      SKIN CANCER EXCISION  2007    squamous cell carcinoma     SKIN CANCER EXCISION  2007    basal cell carcinoma    SQUAMOUS CELL CARCINOMA EXCISION      TOE SURGERY Right 08/04/2022    Right Great Toe Fusion    TONSILLECTOMY      TUBAL LIGATION      UPPER GASTROINTESTINAL ENDOSCOPY      US GUIDED BREAST BIOPSY RIGHT COMPLETE Right 07/18/2022 01/04/23 1118   OT Last Visit   OT Visit Date 01/04/23   Note Type   Note type Evaluation   Pain Assessment   Pain Assessment Tool 0-10   Pain Score 9   Pain Location/Orientation Location: Head   Pain Onset/Description Onset: Ongoing; Descriptor: Headache   Effect of Pain on Daily Activities limits activity tolerance   Patient's Stated Pain Goal No pain   Hospital Pain Intervention(s) Repositioned; Ambulation/increased activity; Emotional support   Restrictions/Precautions   Weight Bearing Precautions Per Order No   Other Precautions Cognitive; Chair Alarm; Bed Alarm; Fall Risk;Pain   Home Living   Type of Home House  ([facility])   Home Layout Two level;Performs ADLs on one level; Able to live on main level with bedroom/bathroom  (0 TRISTIN)   Bathroom Shower/Tub Walk-in shower   Bathroom Toilet Raised   Bathroom Equipment Built-in shower seat   Bathroom Accessibility Accessible   Home Equipment Walker;Cane   Prior Function   Level of [place] Independent with ADLs; Independent with functional mobility; Independent with IADLS   Lives With Spouse  (and 47 yo son)   Receives Help From Family   IADLs Family/Friend/Other provides meals; Family/Friend/Other provides transportation; Independent with medication management  (spouse occasional A with medications)   Falls in the last 6 months 1 to 4  (1 fall; reason for admission)   Vocational Retired  (Respiratory therapist)   Comments Pt reports (I) with ADLs PTA, no AD for mobility  Spouse completes most IADLs for pt, has been limited recently by pain (headaches)  Pt reports she has had some difficulty with balance for a while  Lifestyle   Autonomy Pt (I) with ADLs PTA living with spouse and son in a 2 level [facility]   Reciprocal Relationships Supportive spouse who is home with pt 24/7, son who is a teacher and works during the day   Service to Others Retired respiratory therapist   Intrinsic Gratification Enjoys being active   Subjective   Subjective "I'll tell you, healthcare is a beast"   ADL   Eating Assistance 7  Independent   Eating Deficit Beverage management   UB Bathing Assistance Unable to assess   LB Bathing Assistance Unable to assess   LB Dressing Assistance 5  Supervision/Setup   LB Dressing Deficit Setup;Don/doff R shoe;Don/doff L shoe; Increased time to complete;Supervision/safety  (sitting EOB)   Toileting Assistance  5  Supervision/Setup   Toileting Deficit Setup;Supervison/safety; Increased time to complete; Bedside commode;Clothing management up;Clothing management down;Perineal hygiene  (BSC at bedside, able to complete mey care sitting)   Bed Mobility   Supine to Sit 5  Supervision   Additional items Assist x 1;HOB elevated; Increased time required   Sit to Supine 5  Supervision   Additional items Assist x 1;HOB elevated; Increased time required   Additional Comments Able to sit EOB with supervision   Transfers   Sit to Stand 5  Supervision   Additional items Assist x 1; Increased time required;Verbal cues   Stand to Sit 5  Supervision   Additional items Assist x 1; Increased time required;Verbal cues   Stand pivot 5  Supervision   Additional items Assist x 1; Increased time required;Verbal cues   Functional Mobility   Functional Mobility 5  Supervision   Additional Comments Short household distance around bed and back with no AD and close supervision/CGA  Pt somewhat unsteady and reaching for furniture to support self reporting "I think you call this furniture surfing"  Pt reports she will have her  bring her rollator in for additional safety with mobility  Additional items   (no AD)   Balance   Static Sitting Good   Dynamic Sitting Fair +   Static Standing Fair   Dynamic Standing Fair   Activity Tolerance   Activity Tolerance Patient limited by fatigue;Patient limited by pain   Medical Staff Made Aware Spoke to PT Jhoana   Nurse Made Aware yes, JAYSHREE gabriel to see pt and updated on outcomes   RUE Assessment   RUE Assessment WFL   LUE Assessment   LUE Assessment WFL   Hand Function   Gross Motor Coordination Functional   Fine Motor Coordination Functional   Vision-Basic Assessment   Current Vision Wears glasses all the time  (not present in hospital)   Patient Visual Report Blurring of print when reading  (pt questions if this is due to not having glasses)   Cognition   Overall Cognitive Status Impaired   Arousal/Participation Alert; Cooperative   Attention Attends with cues to redirect   Orientation Level Oriented to person;Oriented to place;Oriented to situation;Disoriented to time  (Pt reports date is Jan 8th  Suprised when told today is only the 4th )   Memory Decreased recall of precautions   Following Commands Follows one step commands without difficulty   Comments Pleasant and agreeable   Pt reports hx of OT to work on cognition following a hospital stay where she was on a ventilator for 12 days  Pt reports at one time she was unable to use a phone becasue she forgot how to  No longer drives  Assessment   Limitation Decreased ADL status; Decreased Safe judgement during ADL;Decreased cognition;Decreased endurance;Decreased self-care trans;Decreased high-level ADLs   Prognosis Good   Assessment Pt is a 67 y o  female seen for OT evaluation s/p admit to [address] [place] [address] on 1/3/2023 w/UTI (urinary tract infection)  Orders received for OT evaluation and treatment  Pt identified during session via name and wristband  Comorbidities affecting patient at time of evaluation include: headache, diabetes, hypertension and hypothyroid  Personal factors affecting patient at time of evaluation include: limited insight into deficits, difficulty performing ADLs and difficulty performing IADLs  PTA, patient was independent with functional mobility without assistive device, independent with ADLS, requiring assist for IADLS, living with spouse and son in a 2 level home with 0 steps to enter and retired  The evaluation identifies the following performance deficits: impaired balance, decreased endurance, increased fall risk, new onset of impairment of functional mobility, decreased ADLS, decreased IADLS, pain, decreased activity tolerance, decreased safety awareness, decreased cognition and decreased strength, that result in activity limitations (as is outlined above in flowsheet)  Based on the OT evaluation outcomes, functional performance deficits, and assessment findings, pt has been identified as high complexity, because the patient presents with comorbidites that affect occupational performance and required significant modification of tasks or assistance with consideration of multiple treatment options  The patient's raw score on the AM-PAC Daily Activity inpatient short form is 21, standardized score is 44 27, greater than 39 4   From an OT standpoint, patients at this level may benefit from d/c to Home with home health rehabilitation  Pt will benefit from continued IPOT treatment to address above deficits and maximize level of independence with ADLs and functional mobility in the areas of: bathing/ shower, dressing, toilet hygiene, transfer to all surfaces and functional ambulation  Goals   Patient Goals to get rid of this headache   LTG Time Frame 10-14   Long Term Goal #1 see goals below   Plan   Treatment Interventions ADL retraining;Functional transfer training; Endurance training;Cognitive reorientation;Patient/family training; Compensatory technique education;Continued evaluation;Equipment evaluation/education; Energy conservation; Activityengagement   Goal Expiration Date 01/14/23   OT Treatment Day 0   OT Frequency 2-3x/wk   Recommendation   OT Discharge Recommendation Home with home health rehabilitation   AM-PAC Daily Activity Inpatient   Lower Body Dressing 3   Bathing 3   Toileting 3   Upper Body Dressing 4   Grooming 4   Eating 4   Daily Activity Raw Score 21   Daily Activity Standardized Score (Calc for Raw Score >=11) 44 27   AM-PAC Applied Cognition Inpatient   Following a Speech/Presentation 3   Understanding Ordinary Conversation 4   Taking Medications 3   Remembering Where Things Are Placed or Put Away 3   Remembering List of 4-5 Errands 3   Taking Care of Complicated Tasks 3   Applied Cognition Raw Score 19   Applied Cognition Standardized Score 39 77   Mini-Cog Assessment   Word Version Version 1: Banana, Sunrise, Chair   Clock Draw (CDT) 2  (VC for minute hand placement)   Word Recall 3   Mini-Cog Score 5   Mini-Cog Results Negative screen for dementia   Mini-Cog Assessment Comment Pt initially sanjay the minute hand to show the time of 11:05, VC and pt able to correct   End of Consult   Patient Position at End of Consult Supine; All needs within reach   Nurse Communication Nurse aware of consult     GOALS:    *Goals established to promote patient goal of to get rid of this headache:      *ADL transfers with (I) for inc'd independence with ADLs/purposeful tasks    *Patient will perform grooming tasks standing at sink with (I) in order to increase overall independence    *LB ADL with (I) using AE prn for inc'd independence with self cares    *Toileting with (I) for clothing management and hygiene to increase hygiene/thoroughness in order to reduce caregiver burden    *Increase stand tolerance x 5  m for inc'd tolerance with standing purposeful tasks    *Patient will verbalize and demonstrate use of energy conservation/deep breathing techniques and work simplification skills during functional activities with no verbal cues  *Patient will engage in ongoing cognitive assessment to assist with safe discharge planning/recommendations  *Patient will increase functional mobility to and from bathroom with rolling walker independently to increase independence with toileting    *Patient will improve functional activity tolerance to 20 minutes of sustained functional tasks to increase participation in basic self-care and decrease assistance level  *Patient will orient self x 4 with minimal verbal cues to increase overall awareness and promote safety with ADL/IADL tasks      Lexis Lozada, OTR/L

## 2023-01-05 ENCOUNTER — APPOINTMENT (INPATIENT)
Dept: VASCULAR ULTRASOUND | Facility: HOSPITAL | Age: 73
End: 2023-01-05

## 2023-01-05 ENCOUNTER — ANESTHESIA (INPATIENT)
Dept: PERIOP | Facility: HOSPITAL | Age: 73
End: 2023-01-05

## 2023-01-05 ENCOUNTER — ANESTHESIA EVENT (INPATIENT)
Dept: PERIOP | Facility: HOSPITAL | Age: 73
End: 2023-01-05

## 2023-01-05 LAB
GLUCOSE SERPL-MCNC: 194 MG/DL (ref 65–140)
GLUCOSE SERPL-MCNC: 197 MG/DL (ref 65–140)
GLUCOSE SERPL-MCNC: 220 MG/DL (ref 65–140)
GLUCOSE SERPL-MCNC: 278 MG/DL (ref 65–140)
GLUCOSE SERPL-MCNC: 408 MG/DL (ref 65–140)
GLUCOSE SERPL-MCNC: 419 MG/DL (ref 65–140)

## 2023-01-05 PROCEDURE — 03BT0ZX EXCISION OF LEFT TEMPORAL ARTERY, OPEN APPROACH, DIAGNOSTIC: ICD-10-PCS | Performed by: SURGERY

## 2023-01-05 RX ORDER — SODIUM CHLORIDE, SODIUM LACTATE, POTASSIUM CHLORIDE, CALCIUM CHLORIDE 600; 310; 30; 20 MG/100ML; MG/100ML; MG/100ML; MG/100ML
INJECTION, SOLUTION INTRAVENOUS CONTINUOUS PRN
Status: DISCONTINUED | OUTPATIENT
Start: 2023-01-05 | End: 2023-01-05

## 2023-01-05 RX ORDER — MAGNESIUM HYDROXIDE 1200 MG/15ML
LIQUID ORAL AS NEEDED
Status: DISCONTINUED | OUTPATIENT
Start: 2023-01-05 | End: 2023-01-05 | Stop reason: HOSPADM

## 2023-01-05 RX ORDER — SODIUM CHLORIDE, SODIUM LACTATE, POTASSIUM CHLORIDE, CALCIUM CHLORIDE 600; 310; 30; 20 MG/100ML; MG/100ML; MG/100ML; MG/100ML
100 INJECTION, SOLUTION INTRAVENOUS CONTINUOUS
Status: DISCONTINUED | OUTPATIENT
Start: 2023-01-05 | End: 2023-01-07 | Stop reason: HOSPADM

## 2023-01-05 RX ORDER — CEFAZOLIN SODIUM 1 G/3ML
INJECTION, POWDER, FOR SOLUTION INTRAMUSCULAR; INTRAVENOUS AS NEEDED
Status: DISCONTINUED | OUTPATIENT
Start: 2023-01-05 | End: 2023-01-05

## 2023-01-05 RX ORDER — FENTANYL CITRATE/PF 50 MCG/ML
25 SYRINGE (ML) INJECTION
Status: DISCONTINUED | OUTPATIENT
Start: 2023-01-05 | End: 2023-01-05 | Stop reason: HOSPADM

## 2023-01-05 RX ORDER — CALCIUM CARBONATE 200(500)MG
1000 TABLET,CHEWABLE ORAL EVERY 4 HOURS PRN
Status: DISCONTINUED | OUTPATIENT
Start: 2023-01-05 | End: 2023-01-07 | Stop reason: HOSPADM

## 2023-01-05 RX ORDER — VALACYCLOVIR HYDROCHLORIDE 500 MG/1
1000 TABLET, FILM COATED ORAL EVERY 8 HOURS SCHEDULED
Status: DISCONTINUED | OUTPATIENT
Start: 2023-01-06 | End: 2023-01-06 | Stop reason: DRUGHIGH

## 2023-01-05 RX ORDER — FENTANYL CITRATE 50 UG/ML
INJECTION, SOLUTION INTRAMUSCULAR; INTRAVENOUS AS NEEDED
Status: DISCONTINUED | OUTPATIENT
Start: 2023-01-05 | End: 2023-01-05

## 2023-01-05 RX ORDER — LIDOCAINE HYDROCHLORIDE 10 MG/ML
INJECTION, SOLUTION EPIDURAL; INFILTRATION; INTRACAUDAL; PERINEURAL AS NEEDED
Status: DISCONTINUED | OUTPATIENT
Start: 2023-01-05 | End: 2023-01-05 | Stop reason: HOSPADM

## 2023-01-05 RX ORDER — INSULIN LISPRO 100 [IU]/ML
10 INJECTION, SOLUTION INTRAVENOUS; SUBCUTANEOUS ONCE
Status: COMPLETED | OUTPATIENT
Start: 2023-01-05 | End: 2023-01-05

## 2023-01-05 RX ORDER — ONDANSETRON 2 MG/ML
4 INJECTION INTRAMUSCULAR; INTRAVENOUS ONCE AS NEEDED
Status: DISCONTINUED | OUTPATIENT
Start: 2023-01-05 | End: 2023-01-05 | Stop reason: HOSPADM

## 2023-01-05 RX ORDER — MIDAZOLAM HYDROCHLORIDE 2 MG/2ML
INJECTION, SOLUTION INTRAMUSCULAR; INTRAVENOUS AS NEEDED
Status: DISCONTINUED | OUTPATIENT
Start: 2023-01-05 | End: 2023-01-05

## 2023-01-05 RX ADMIN — Medication 250 MG: at 20:47

## 2023-01-05 RX ADMIN — FENTANYL CITRATE 25 MCG: 50 INJECTION, SOLUTION INTRAMUSCULAR; INTRAVENOUS at 14:32

## 2023-01-05 RX ADMIN — ACETAMINOPHEN 650 MG: 325 TABLET, FILM COATED ORAL at 07:30

## 2023-01-05 RX ADMIN — FENTANYL CITRATE 25 MCG: 50 INJECTION, SOLUTION INTRAMUSCULAR; INTRAVENOUS at 15:16

## 2023-01-05 RX ADMIN — CALCIUM 1 TABLET: 500 TABLET ORAL at 07:29

## 2023-01-05 RX ADMIN — LOSARTAN POTASSIUM 25 MG: 25 TABLET, FILM COATED ORAL at 07:29

## 2023-01-05 RX ADMIN — METOPROLOL TARTRATE 50 MG: 50 TABLET, FILM COATED ORAL at 07:30

## 2023-01-05 RX ADMIN — FENTANYL CITRATE 25 MCG: 50 INJECTION, SOLUTION INTRAMUSCULAR; INTRAVENOUS at 15:05

## 2023-01-05 RX ADMIN — FAMOTIDINE 20 MG: 20 TABLET, FILM COATED ORAL at 21:50

## 2023-01-05 RX ADMIN — CALCIUM CARBONATE (ANTACID) CHEW TAB 500 MG 1000 MG: 500 CHEW TAB at 11:43

## 2023-01-05 RX ADMIN — INSULIN LISPRO 10 UNITS: 100 INJECTION, SOLUTION INTRAVENOUS; SUBCUTANEOUS at 21:50

## 2023-01-05 RX ADMIN — INSULIN LISPRO 25 UNITS: 100 INJECTION, SOLUTION INTRAVENOUS; SUBCUTANEOUS at 20:41

## 2023-01-05 RX ADMIN — CALCIUM CARBONATE (ANTACID) CHEW TAB 500 MG 1000 MG: 500 CHEW TAB at 03:36

## 2023-01-05 RX ADMIN — CEFEPIME HYDROCHLORIDE 1000 MG: 1 INJECTION, SOLUTION INTRAVENOUS at 10:54

## 2023-01-05 RX ADMIN — INSULIN DETEMIR 50 UNITS: 100 INJECTION, SOLUTION SUBCUTANEOUS at 18:29

## 2023-01-05 RX ADMIN — CHOLECALCIFEROL TAB 10 MCG (400 UNIT) 400 UNITS: 10 TAB at 07:29

## 2023-01-05 RX ADMIN — LOSARTAN POTASSIUM 50 MG: 50 TABLET, FILM COATED ORAL at 07:28

## 2023-01-05 RX ADMIN — SODIUM CHLORIDE, SODIUM LACTATE, POTASSIUM CHLORIDE, AND CALCIUM CHLORIDE: .6; .31; .03; .02 INJECTION, SOLUTION INTRAVENOUS at 14:20

## 2023-01-05 RX ADMIN — Medication 250 MG: at 07:31

## 2023-01-05 RX ADMIN — PANTOPRAZOLE SODIUM 40 MG: 40 TABLET, DELAYED RELEASE ORAL at 07:29

## 2023-01-05 RX ADMIN — PANTOPRAZOLE SODIUM 40 MG: 40 TABLET, DELAYED RELEASE ORAL at 18:29

## 2023-01-05 RX ADMIN — MIDAZOLAM HYDROCHLORIDE 2 MG: 1 INJECTION, SOLUTION INTRAMUSCULAR; INTRAVENOUS at 14:26

## 2023-01-05 RX ADMIN — GABAPENTIN 300 MG: 300 CAPSULE ORAL at 21:50

## 2023-01-05 RX ADMIN — INSULIN LISPRO 2 UNITS: 100 INJECTION, SOLUTION INTRAVENOUS; SUBCUTANEOUS at 11:44

## 2023-01-05 RX ADMIN — DULOXETINE HYDROCHLORIDE 60 MG: 60 CAPSULE, DELAYED RELEASE ORAL at 07:30

## 2023-01-05 RX ADMIN — LEVOTHYROXINE SODIUM 112 MCG: 112 TABLET ORAL at 05:37

## 2023-01-05 RX ADMIN — PRAMIPEXOLE DIHYDROCHLORIDE 0.25 MG: 0.25 TABLET ORAL at 21:50

## 2023-01-05 RX ADMIN — SODIUM CHLORIDE 500 MG: 0.9 INJECTION, SOLUTION INTRAVENOUS at 07:34

## 2023-01-05 RX ADMIN — ATORVASTATIN CALCIUM 40 MG: 40 TABLET, FILM COATED ORAL at 20:42

## 2023-01-05 RX ADMIN — SODIUM CHLORIDE, SODIUM LACTATE, POTASSIUM CHLORIDE, AND CALCIUM CHLORIDE 100 ML/HR: .6; .31; .03; .02 INJECTION, SOLUTION INTRAVENOUS at 18:25

## 2023-01-05 RX ADMIN — INSULIN LISPRO 5 UNITS: 100 INJECTION, SOLUTION INTRAVENOUS; SUBCUTANEOUS at 20:40

## 2023-01-05 RX ADMIN — AMLODIPINE BESYLATE 5 MG: 5 TABLET ORAL at 07:30

## 2023-01-05 RX ADMIN — FENTANYL CITRATE 25 MCG: 50 INJECTION, SOLUTION INTRAMUSCULAR; INTRAVENOUS at 15:13

## 2023-01-05 RX ADMIN — METOPROLOL TARTRATE 50 MG: 50 TABLET, FILM COATED ORAL at 20:45

## 2023-01-05 RX ADMIN — TORSEMIDE 20 MG: 20 TABLET ORAL at 07:30

## 2023-01-05 RX ADMIN — CEFAZOLIN 1000 MG: 1 INJECTION, POWDER, FOR SOLUTION INTRAMUSCULAR; INTRAVENOUS at 14:37

## 2023-01-05 RX ADMIN — CEFEPIME HYDROCHLORIDE 1000 MG: 1 INJECTION, SOLUTION INTRAVENOUS at 20:45

## 2023-01-05 NOTE — ASSESSMENT & PLAN NOTE
Blood Pressure: 129/76    Patient with hypertension, continue with home regimen  Torsemide 20 mg, metoprolol 50 mg twice daily, amlodipine 5 mg, 20 mg olmesartan a m  and 10 mg p m  (formulary replacement with losartan)    Plan:  · Continue torsemide  · Continue metoprolol  · Continue amlodipine  · Losartan 50 mg a m , 25 mg p m

## 2023-01-05 NOTE — ASSESSMENT & PLAN NOTE
Lab Results   Component Value Date    HGBA1C 6 7 (H) 11/04/2022       Recent Labs     01/04/23  2118 01/05/23  0629 01/05/23  1128 01/05/23  1630   POCGLU 397* 197* 220* 194*       Blood Sugar Average: Last 72 hrs:  (P) 242 [id number]'s home diabetes regimen is NovoLog with meals  16 units with breakfast, 16 units with lunch, and 25 units with dinner  Levemir 50 units at bedtime  Plan:  · Initially had hypoglycemia on admission however subsequently has hyperglycemia on IV steroids  · Continue home regimen of Humalog 16 units with breakfast, 16 units with lunch, 25 units with dinner  · Continue with detemir 50 units at bedtime  Patient will be n p o  past midnight for temporal artery biopsy tomorrow  · Sliding scale insulin  · Carb controlled diet  · May need further titration of insulin based on blood glucose control

## 2023-01-05 NOTE — ANESTHESIA PREPROCEDURE EVALUATION
Procedure:  BIOPSY ARTERY TEMPORAL (Left: Head)    Relevant Problems   ANESTHESIA (within normal limits)      CARDIO   (+) HTN (hypertension)   (+) Other chest pain      ENDO   (+) Hypothyroid   (+) Type 2 diabetes mellitus with diabetic chronic kidney disease (HCC)   (+) Type 2 diabetes mellitus with diabetic nephropathy, with long-term current use of insulin (HCC)      GI/HEPATIC (within normal limits)      /RENAL   (+) ANDRA (acute kidney injury) (HCC)   (+) Anemia of chronic renal failure, stage 3 (moderate) (HCC)   (+) Diabetic nephropathy associated with type 2 diabetes mellitus (HCC)   (+) Hypertensive chronic kidney disease with stage 1 through stage 4 chronic kidney disease, or unspecified chronic kidney disease   (+) Stage 4 chronic kidney disease (HCC)      GYN (within normal limits)      HEMATOLOGY   (+) Anemia in stage 4 chronic kidney disease (HCC)   (+) Anemia of chronic renal failure, stage 3 (moderate) (HCC)      MUSCULOSKELETAL   (+) Fibromyalgia      NEURO/PSYCH   (+) Depression, recurrent (HCC)   (+) Fibromyalgia   (+) Gastroparesis diabeticorum (HCC)   (+) Headache   (+) History of colon polyps   (+) History of kidney stones      PULMONARY   (+) Acute respiratory failure with hypoxia (HCC)   (+) COPD (chronic obstructive pulmonary disease) (HCC)   (+) Chronic respiratory failure with hypoxia (HCC)   (+) Dyspnea   (+) BARBARA (obstructive sleep apnea)        NM myocardial perfusion spect 9/20/2021  SUMMARY:  -  Stress results: There was no chest pain during stress  -  ECG conclusions: The stress ECG was negative for ischemia and normal   -  Perfusion imaging: There were no perfusion defects   -  Gated SPECT: The calculated left ventricular ejection fraction was 58 %  Left ventricular ejection fraction was within normal limits by visual estimate  There was no left ventricular regional abnormality      IMPRESSIONS: Normal study after pharmacologic vasodilation without reproduction of symptoms  Myocardial perfusion imaging was normal at rest and with stress  Left ventricular systolic function was normal       TTE 5/3/2020  SUMMARY     LEFT VENTRICLE:  Systolic function was normal  Ejection fraction was estimated in the range of 55 %  Although no diagnostic regional wall motion abnormality was identified, this possibility cannot be completely excluded on the basis of this study      LEFT ATRIUM:  The atrium was dilated      MITRAL VALVE:  There was trace regurgitation  Physical Exam    Airway    Mallampati score: III  TM Distance: <3 FB  Neck ROM: full     Dental   No notable dental hx     Cardiovascular      Pulmonary      Other Findings        Anesthesia Plan  ASA Score- 4     Anesthesia Type- IV sedation with anesthesia with ASA Monitors  Additional Monitors:   Airway Plan:           Plan Factors-Exercise tolerance (METS): >4 METS  Chart reviewed  EKG reviewed  Existing labs reviewed  Patient summary reviewed  Patient is not a current smoker  Induction- intravenous  Postoperative Plan- Plan for postoperative opioid use  Informed Consent- Anesthetic plan and risks discussed with patient  I personally reviewed this patient with the CRNA  Discussed and agreed on the Anesthesia Plan with the CRNA  Toby Payan

## 2023-01-05 NOTE — PLAN OF CARE
Problem: NEUROSENSORY - ADULT  Goal: Achieves stable or improved neurological status  Description: INTERVENTIONS  - Monitor and report changes in neurological status  - Monitor vital signs such as temperature, blood pressure, glucose, and any other labs ordered   - Initiate measures to prevent increased intracranial pressure  - Monitor for seizure activity and implement precautions if appropriate      1/5/2023 0741 by Rhett Collins  Outcome: Progressing    Goal: Achieves maximal functionality and self care  Description: INTERVENTIONS  - Monitor swallowing and airway patency with patient fatigue and changes in neurological status  - Encourage and assist patient to increase activity and self care     - Encourage visually impaired, hearing impaired and aphasic patients to use assistive/communication devices  1/5/2023 0741 by Rhett Collins  Outcome: Progressing     Problem: Potential for Falls  Goal: Patient will remain free of falls  Description: INTERVENTIONS:  - Educate patient/family on patient safety including physical limitations  - Instruct patient to call for assistance with activity   - Consult OT/PT to assist with strengthening/mobility   - Keep Call bell within reach  - Keep bed low and locked with side rails adjusted as appropriate  - Keep care items and personal belongings within reach  - Initiate and maintain comfort rounds  - Make Fall Risk Sign visible to staff  - Offer Toileting every 2 Hours, in advance of need  - Initiate/Maintain bed alarm  - Obtain necessary fall risk management equipment: walker  - Apply yellow socks and bracelet for high fall risk patients  - Consider moving patient to room near nurses station  1/5/2023 0741 by Rhett Collins  Outcome: Progressing     Problem: Prexisting or High Potential for Compromised Skin Integrity  Goal: Skin integrity is maintained or improved  Description: INTERVENTIONS:  - Identify patients at risk for skin breakdown  - Assess and monitor skin integrity  - Assess and monitor nutrition and hydration status  - Monitor labs   - Provide appropriate hygiene as needed including keeping skin clean and dry  - Evaluate need for skin moisturizer/barrier cream  - Collaborate with interdisciplinary team   - Patient/family teaching  1/5/2023 8604 by Ling Chahal  Outcome: Progressing     Problem: MOBILITY - ADULT  Goal: Maintain or return to baseline ADL function  Description: INTERVENTIONS:  -  Assess patient's ability to carry out ADLs; assess patient's baseline for ADL function and identify physical deficits which impact ability to perform ADLs (bathing, care of mouth/teeth, toileting, grooming, dressing, etc )  - Assess/evaluate cause of self-care deficits   - Assess range of motion  - Assess patient's mobility; develop plan if impaired  - Assess patient's need for assistive devices and provide as appropriate  - Encourage maximum independence but intervene and supervise when necessary  - Involve family in performance of ADLs  - Assess for home care needs following discharge   - Consider OT consult to assist with ADL evaluation and planning for discharge  - Provide patient education as appropriate  1/5/2023 0741 by Ling Chahal  Outcome: Progressing  Goal: Maintains/Returns to pre admission functional level  Description: INTERVENTIONS:  - Perform BMAT or MOVE assessment daily    - Set and communicate daily mobility goal to care team and patient/family/caregiver     - Collaborate with rehabilitation services on mobility goals if consulted  - Out of bed for toileting  - Record patient progress and toleration of activity level   Outcome: Progressing

## 2023-01-05 NOTE — PLAN OF CARE
Problem: MOBILITY - ADULT  Goal: Maintain or return to baseline ADL function  Description: INTERVENTIONS:  -  Assess patient's ability to carry out ADLs; assess patient's baseline for ADL function and identify physical deficits which impact ability to perform ADLs (bathing, care of mouth/teeth, toileting, grooming, dressing, etc )  - Assess/evaluate cause of self-care deficits   - Assess range of motion  - Assess patient's mobility; develop plan if impaired  - Assess patient's need for assistive devices and provide as appropriate  - Encourage maximum independence but intervene and supervise when necessary  - Involve family in performance of ADLs  - Assess for home care needs following discharge   - Consider OT consult to assist with ADL evaluation and planning for discharge  - Provide patient education as appropriate  Outcome: Progressing     Problem: Prexisting or High Potential for Compromised Skin Integrity  Goal: Skin integrity is maintained or improved  Description: INTERVENTIONS:  - Identify patients at risk for skin breakdown  - Assess and monitor skin integrity  - Assess and monitor nutrition and hydration status  - Monitor labs   - Provide appropriate hygiene as needed including keeping skin clean and dry  - Evaluate need for skin moisturizer/barrier cream  - Collaborate with interdisciplinary team   - Patient/family teaching  Outcome: Progressing     Problem: Potential for Falls  Goal: Patient will remain free of falls  Description: INTERVENTIONS:  - Educate patient/family on patient safety including physical limitations  - Instruct patient to call for assistance with activity   - Consult OT/PT to assist with strengthening/mobility   - Keep Call bell within reach  - Keep bed low and locked with side rails adjusted as appropriate  - Keep care items and personal belongings within reach  - Initiate and maintain comfort rounds  - Make Fall Risk Sign visible to staff  - Offer Toileting every 2 Hours, in advance of need  - Initiate/Maintain bed alarm  - Obtain necessary fall risk management equipment: walker  - Apply yellow socks and bracelet for high fall risk patients  - Consider moving patient to room near nurses station  Outcome: Progressing

## 2023-01-05 NOTE — PROGRESS NOTES
[facility]  Progress Note Aida Hays 1950, 67 y o  female MRN: [id number]  Unit/Bed#: S -01 Encounter: [id number]  Primary Care Provider: Migel Diana MD   Date and time admitted to hospital: 1/3/2023  4:32 AM    * Headache  Assessment & Plan  Patient presented with headache starting approximately 5 days ago  She came to the emergency department on 1/1/23 and was discharged with Fioricet with some improvement in her headache  She returns today with worsening headache with associated left temporal tenderness  He also has some associated confusion  She was found to have mildly elevated ESR of 56 and out of concern for GCA she was given prednisone in the emergency department  Patient also reports some shoulder and low back pain but may be secondary to osteoarthritis  Plan:  · Continue with high-dose IV Solu-Medrol Day 2/3 days followed by prednisone po  · ESR and CRP are elevated raising concern for GCA  · Neurology consult appreciate recommendations  · Patient scheduled for temporal artery biopsy this afternoon  · N p o  for procedure  Hypothyroid  Assessment & Plan  Lab Results   Component Value Date    [id number] 1 884 01/04/2023    FREET4 1 16 03/31/2022     Continue levothyroxine 112    UTI (urinary tract infection)  Assessment & Plan  Patient with suprapubic pain and UA showing normal bacteria and 4-10 WBCs  Patient has history of UTI with Enterobacter cloacae  Patient also has mildly elevated WBC at 12 and some confusion oriented to person only  Given suprapubic tenderness and leukocytosis with positive UA, will treat for suspected UTI  Previous cultures show susceptibility to cefepime  Plan:  · Cefepime 1 g every 12 hours  · Follow-up urine culture  · Patient does have intermittent urinary retention and recurrent UTIs    Has followed up with urologist as an outpatient and was recommended Estrace cream 3 times weekly which she was not using consistently  Will recommend close outpatient urology follow-up for further management  HTN (hypertension)  Assessment & Plan  Blood Pressure: 129/76    Patient with hypertension, continue with home regimen  Torsemide 20 mg, metoprolol 50 mg twice daily, amlodipine 5 mg, 20 mg olmesartan a m  and 10 mg p m  (formulary replacement with losartan)    Plan:  · Continue torsemide  · Continue metoprolol  · Continue amlodipine  · Losartan 50 mg a m , 25 mg p m  Type 2 diabetes mellitus with diabetic chronic kidney disease [facility])  Assessment & Plan  Lab Results   Component Value Date    HGBA1C 6 7 (H) 11/04/2022       Recent Labs     01/04/23  2118 01/05/23  0629 01/05/23  1128 01/05/23  1630   POCGLU 397* 197* 220* 194*       Blood Sugar Average: Last 72 hrs:  (P) 242 [id number]'s home diabetes regimen is NovoLog with meals  16 units with breakfast, 16 units with lunch, and 25 units with dinner  Levemir 50 units at bedtime  Plan:  · Initially had hypoglycemia on admission however subsequently has hyperglycemia on IV steroids  · Continue home regimen of Humalog 16 units with breakfast, 16 units with lunch, 25 units with dinner  · Continue with detemir 50 units at bedtime  Patient will be n p o  past midnight for temporal artery biopsy tomorrow  · Sliding scale insulin  · Carb controlled diet  · May need further titration of insulin based on blood glucose control  VTE Pharmacologic Prophylaxis:   VTE Score: 3 Moderate Risk (Score 3-4) - Pharmacological DVT Prophylaxis Ordered: Enoxaparin (Lovenox)  On hold today due to biopsy  Mechanical VTE Prophylaxis in Place: Yes    Patient Centered Rounds: I have performed bedside rounds with nursing staff today  Discussions with Specialists or Other Care Team Provider: Vascular surgery team   Neurology team     Education and Discussions with Family / Patient: Attempted to update  () via phone  Left voicemail      Current Length of Stay: 1 day(s)    Current Patient Status: Inpatient     Discharge Plan / Estimated Discharge Date: Anticipate discharge tomorrow to home with home services  Code Status: Level 3 - DNAR and DNI      Subjective:   Patient was awake and sitting up in bed  She states that her headache is better after she slept  She is awaiting her biopsy today  She denies fever, chills, HA, CP, SOB,  palpitations, Abd pain, n/v/d  Objective:     Vitals:   Temp (24hrs), Av 9 °F (36 6 °C), Min:97 2 °F (36 2 °C), Max:98 7 °F (37 1 °C)    Temp:  [97 2 °F (36 2 °C)-98 7 °F (37 1 °C)] 98 7 °F (37 1 °C)  HR:  [61-70] 62  Resp:  [14-20] 18  BP: (117-143)/(66-78) 129/76  SpO2:  [93 %-99 %] 99 %  Body mass index is 30 24 kg/m²  Input and Output Summary (last 24 hours): Intake/Output Summary (Last 24 hours) at 2023 1743  Last data filed at 2023 1516  Gross per 24 hour   Intake 700 ml   Output --   Net 700 ml       Physical Exam:     Physical Exam  Vitals and nursing note reviewed  Constitutional:       General: She is not in acute distress  Appearance: Normal appearance  She is obese  She is not ill-appearing, toxic-appearing or diaphoretic  HENT:      Head: Normocephalic and atraumatic  Nose: Nose normal    Eyes:      General: No scleral icterus  Right eye: No discharge  Left eye: No discharge  Extraocular Movements: Extraocular movements intact  Pupils: Pupils are equal, round, and reactive to light  Neck:      Vascular: No carotid bruit  Cardiovascular:      Rate and Rhythm: Normal rate and regular rhythm  Pulses: Normal pulses  Heart sounds: Normal heart sounds  No murmur heard  No friction rub  No gallop  Pulmonary:      Effort: Pulmonary effort is normal       Breath sounds: Normal breath sounds  No wheezing, rhonchi or rales  Abdominal:      General: Bowel sounds are normal       Palpations: Abdomen is soft  Tenderness:  There is no abdominal tenderness  There is no guarding or rebound  Musculoskeletal:         General: No swelling or tenderness  Cervical back: Normal range of motion and neck supple  No rigidity  No muscular tenderness  Right lower leg: No edema  Left lower leg: No edema  Skin:     Capillary Refill: Capillary refill takes less than 2 seconds  Coloration: Skin is not jaundiced  Findings: No bruising, erythema, lesion or rash  Neurological:      General: No focal deficit present  Mental Status: She is alert and oriented to person, place, and time  Motor: No weakness  Gait: Gait normal    Psychiatric:         Mood and Affect: Mood normal          Behavior: Behavior normal          Thought Content:  Thought content normal          Judgment: Judgment normal           Additional Data:     Labs:  Results from last 7 days   Lab Units 01/04/23  0739   WBC Thousand/uL 13 74*   HEMOGLOBIN g/dL 12 5   HEMATOCRIT % 38 0   PLATELETS Thousands/uL 231   NEUTROS PCT % 81*   LYMPHS PCT % 12*   MONOS PCT % 7   EOS PCT % 0     Results from last 7 days   Lab Units 01/04/23  0739 01/03/23  0443   SODIUM mmol/L 135 139   POTASSIUM mmol/L 3 7 4 0   CHLORIDE mmol/L 99 100   CO2 mmol/L 28 29   BUN mg/dL 58* 40*   CREATININE mg/dL 1 88* 1 50*   ANION GAP mmol/L 8 10   CALCIUM mg/dL 9 1 10 6*   ALBUMIN g/dL  --  4 3   TOTAL BILIRUBIN mg/dL  --  0 49   ALK PHOS U/L  --  75   ALT U/L  --  140*   AST U/L  --  64*   GLUCOSE RANDOM mg/dL 167* 78         Results from last 7 days   Lab Units 01/05/23  1630 01/05/23  1128 01/05/23  0629 01/04/23  2118 01/04/23  2001 01/04/23  1605 01/04/23  1142 01/04/23  0723 01/03/23  2216   POC GLUCOSE mg/dl 194* 220* 197* 397* 328* 176* 280* 176* 211*         Results from last 7 days   Lab Units 01/03/23  0805   LACTIC ACID mmol/L 0 7       Imaging: Reviewed radiology reports from this admission including: procedure reports    Recent Cultures (last 7 days):     Results from last 7 days   Lab Units 01/03/23  1144 01/03/23  0812 01/03/23  0805   BLOOD CULTURE   --  No Growth at 48 hrs  No Growth at 48 hrs     URINE CULTURE  >100,000 cfu/ml Enterobacter cloacae complex*  --   --        Lines/Drains:  Invasive Devices     Peripheral Intravenous Line  Duration           Peripheral IV 01/05/23 Right Forearm <1 day                Telemetry:        Last 24 Hours Medication List:   Current Facility-Administered Medications   Medication Dose Route Frequency Provider Last Rate   • acetaminophen  650 mg Oral Q6H PRN Gale Alfaro MD     • albuterol  2 puff Inhalation Q6H PRN Gale Alfaro MD     • amLODIPine  5 mg Oral Daily Gale Alfaro MD     • atorvastatin  40 mg Oral Daily With iLlo Carias MD     • calcium carbonate  1 tablet Oral Daily With Breakfast Gale Alfaro MD     • calcium carbonate  1,000 mg Oral Q4H PRN Gale Alfaro MD     • cefepime  1,000 mg Intravenous Q12H Gale Alfaro MD 1,000 mg (01/05/23 1054)   • cholecalciferol  400 Units Oral Daily Gale Alfaro MD     • DULoxetine  60 mg Oral Daily Gale Alfaro MD     • enoxaparin  30 mg Subcutaneous Daily Gale Aflaro MD     • estradiol  1 g Vaginal Once per day on Mon Wed Fri Gale Alfaro MD     • famotidine  20 mg Oral HS Gale Alfaro MD     • gabapentin  300 mg Oral HS Gale Alfaro MD     • insulin detemir  50 Units Subcutaneous QPM Jason Moss MD     • insulin lispro  1-5 Units Subcutaneous TID AC Gale Alfaro MD     • insulin lispro  16 Units Subcutaneous Daily With Breakfast Gale Alfaro MD     • insulin lispro  16 Units Subcutaneous Daily With Lunch Gale Alfaro MD     • insulin lispro  25 Units Subcutaneous Daily With Dinner Gale Alfaro MD     • lactated ringers  100 mL/hr Intravenous Continuous Gale Alfaro MD     • levothyroxine  112 mcg Oral Early Morning Gale Alfaro MD     • losartan  25 mg Oral Daily Anya Connor MD Joann     • losartan  50 mg Oral Daily Bob Luu MD     • methylPREDNISolone sodium succinate  500 mg Intravenous Daily Bob Luu  mg (01/05/23 0734)   • metoprolol tartrate  50 mg Oral Q12H [address] Bob Luu MD     • multivitamin stress formula  1 tablet Oral Daily Bob Luu MD     • pantoprazole  40 mg Oral BID Bob Luu MD     • pramipexole  0 25 mg Oral HS Bob Luu MD     • saccharomyces boulardii  250 mg Oral BID Bob Luu MD     • torsemide  20 mg Oral Daily Bob Luu MD        Nutrition Assessment and Intervention:     Reviewed food recall journal      Physical Activity Assessment and Intervention:    Activity journal reviewed      Emotional and Mental Well-being, Sleep, Connectedness Assessment and Intervention:    Sleep/stress assessment performed    Depression and anxiety screening performed and reviewed      Tobacco and Toxic Substance Assessment and Intervention:     Tobacco use screening performed    Alcohol and drug use screening performed      Today, Patient Was Seen By: Dallas Be MD    ** Please Note: This note has been constructed using a voice recognition system   **

## 2023-01-05 NOTE — PROGRESS NOTES
Progress Note - Vascular Surgery  : [facility] Vascular Surgery on Helio King 67 y o  female MRN: [id number]  Unit/Bed#: S MS Son[facility]-01 Encounter: [id number]    Assessment:  67 y o  female with L temporal headache       Plan:  AM US of L temporal artery  OR today for temporal artery biopsy    Subjective/Objective     Subjective: No acute complaints, endorses L headache after palpation of temporal artery      Objective:       Physical Exam:  GEN: NAD  HEENT: MMM  CV: RRR  Lung: Normal effort  Ab: Soft, ND/NT   Neuro: A+Ox3     L temporal artery palpable pulse        Vitals:  /78   Pulse 61   Temp 98 3 °F (36 8 °C)   Resp 20   Ht 5' 2" (1 575 m)   Wt 75 kg (165 lb 5 5 oz)   SpO2 96%   BMI 30 24 kg/m²     I/Os:  I/O last 3 completed shifts: In: 1100 [IV Piggyback:1100]  Out: -   No intake/output data recorded      Lab Results and Cultures:   Lab Results   Component Value Date    WBC 13 74 (H) 01/04/2023    HGB 12 5 01/04/2023    HCT 38 0 01/04/2023    MCV 88 01/04/2023     01/04/2023     Lab Results   Component Value Date    GLUCOSE 168 (H) 11/30/2018    CALCIUM 9 1 01/04/2023    K 3 7 01/04/2023    CO2 28 01/04/2023    CL 99 01/04/2023    BUN 58 (H) 01/04/2023    CREATININE 1 88 (H) 01/04/2023     Lab Results   Component Value Date    INR 0 93 07/20/2022    INR 0 94 05/17/2021    INR 0 96 06/19/2020    PROTIME 12 4 07/20/2022    PROTIME 12 6 05/17/2021    PROTIME 12 2 06/19/2020        Blood Culture:   Lab Results   Component Value Date    BLOODCX No Growth at 24 hrs  01/03/2023   ,   Urinalysis:   Lab Results   Component Value Date    COLORU Yellow 01/03/2023    CLARITYU Clear 01/03/2023    SPECGRAV 1 016 01/03/2023    PHUR 6 0 01/03/2023    PHUR 6 0 04/13/2022    LEUKOCYTESUR Negative 01/03/2023    NITRITE Positive (A) 01/03/2023    GLUCOSEU Negative 01/03/2023    KETONESU Negative 01/03/2023    BILIRUBINUR Negative 01/03/2023    BLOODU Negative 01/03/2023   ,   Urine Culture:   Lab Results   Component Value Date    URINECX 60,000-69,000 cfu/ml Enterobacter cloacae (A) 08/11/2022   ,   Wound Culure: No results found for: WOUNDCULT    Medications:  Current Facility-Administered Medications   Medication Dose Route Frequency   • acetaminophen (TYLENOL) tablet 650 mg  650 mg Oral Q6H PRN   • albuterol (PROVENTIL HFA,VENTOLIN HFA) inhaler 2 puff  2 puff Inhalation Q6H PRN   • amLODIPine (NORVASC) tablet 5 mg  5 mg Oral Daily   • atorvastatin (LIPITOR) tablet 40 mg  40 mg Oral Daily With Dinner   • calcium carbonate (OYSTER SHELL,OSCAL) 500 mg tablet 1 tablet  1 tablet Oral Daily With Breakfast   • calcium carbonate (TUMS) chewable tablet 1,000 mg  1,000 mg Oral Q4H PRN   • cefepime (MAXIPIME) IVPB (premix in dextrose) 1,000 mg 50 mL  1,000 mg Intravenous Q12H   • cholecalciferol (VITAMIN D3) tablet 400 Units  400 Units Oral Daily   • DULoxetine (CYMBALTA) delayed release capsule 60 mg  60 mg Oral Daily   • enoxaparin (LOVENOX) subcutaneous injection 30 mg  30 mg Subcutaneous Daily   • estradiol (ESTRACE) vaginal cream 1 g  1 g Vaginal Once per day on Mon Wed Fri   • famotidine (PEPCID) tablet 20 mg  20 mg Oral HS   • gabapentin (NEURONTIN) capsule 300 mg  300 mg Oral HS   • insulin detemir (LEVEMIR) subcutaneous injection 40 Units  40 Units Subcutaneous QPM   • insulin lispro (HumaLOG) 100 units/mL subcutaneous injection 1-5 Units  1-5 Units Subcutaneous TID AC   • insulin lispro (HumaLOG) 100 units/mL subcutaneous injection 16 Units  16 Units Subcutaneous Daily With Breakfast   • insulin lispro (HumaLOG) 100 units/mL subcutaneous injection 16 Units  16 Units Subcutaneous Daily With Lunch   • insulin lispro (HumaLOG) 100 units/mL subcutaneous injection 25 Units  25 Units Subcutaneous Daily With Dinner   • levothyroxine tablet 112 mcg  112 mcg Oral Early Morning   • losartan (COZAAR) tablet 25 mg  25 mg Oral Daily   • losartan (COZAAR) tablet 50 mg  50 mg Oral Daily   • methylPREDNISolone sodium succinate (Solu-MEDROL) 500 mg in sodium chloride 0 9 % 250 mL IVPB  500 mg Intravenous Daily   • metoprolol tartrate (LOPRESSOR) tablet 50 mg  50 mg Oral Q12H [address]   • multivitamin stress formula tablet 1 tablet  1 tablet Oral Daily   • pantoprazole (PROTONIX) EC tablet 40 mg  40 mg Oral BID   • pramipexole (MIRAPEX) tablet 0 25 mg  0 25 mg Oral HS   • saccharomyces boulardii (FLORASTOR) capsule 250 mg  250 mg Oral BID   • torsemide (DEMADEX) tablet 20 mg  20 mg Oral Daily           Parish Reyes MD  1/5/2023

## 2023-01-05 NOTE — OP NOTE
OPERATIVE REPORT  PATIENT NAME: Jonah Chaney    :  1950  MRN: [id number]  Pt Location: AN OR ROOM 01    SURGERY DATE: 2023    Surgeon(s) and Role:     * Gracie Currie DO - Primary     * Dewey Orr MD - Assisting    Preop Diagnosis:  Headache [R51 9]  Suspected temporal arterities  Post-Op Diagnosis Codes:     * Headache [R51 9]    Procedure(s) (LRB):  BIOPSY ARTERY TEMPORAL (Left)    Specimen(s):  ID Type Source Tests Collected by Time Destination   1 : LEFT TEMPORAL ARTERY BIOPSY Tissue Artery TISSUE EXAM Gracie Currie DO 2023 1443        Estimated Blood Loss:   Minimal    Drains:  * No LDAs found *    Anesthesia Type:   Mac and local    Operative Indications:  Headache [R51 9]  Suspected temporal arterities    Operative Findings:  Temp  A sent to path    Complications:   None    Procedure and Technique:  Left temple was prepped and draped  Artery was identified by doppler prior  An incision was made in longitudinal orientation over the vessel and dissection was carried onto the vessel using cautery and sharp dissection  A 1 cm segment of the vessel was resected and be being ligated with silk ties  The specimen was sent to pathology  The wound was irrigated and noted to hemostatic  The deeper layer was closed using running vicryl and skin was closed using 4-0 monocryl  She was tolerated the procedure well and was taken to recovery in stable condition           I was present for the entire procedure    Patient Disposition:  PACU         SIGNATURE: Gracie Currie DO  DATE: 2023  TIME: 3:08 PM

## 2023-01-05 NOTE — OCCUPATIONAL THERAPY NOTE
Occupational Therapy Cancel Note     Patient Name: Tripp Harrison  [id number] Date: 1/5/2023  Problem List  Principal Problem:    Headache  Active Problems:    Type 2 diabetes mellitus with diabetic chronic kidney disease (HCC)    HTN (hypertension)    UTI (urinary tract infection)    Hypothyroid       01/05/23 1300   OT Last Visit   OT Visit Date   (Thursday)   Note Type   Note Type Cancelled Session   Cancel Reasons Patient to operating room  (temporal artery biopsy)    Rosa Isela Clemons, OTR/L

## 2023-01-05 NOTE — CASE MANAGEMENT
Case Management Assessment & Discharge Planning Note    Patient name Rashi Sexton  Location OR POOL/OR POOL MRN [id number]  : 1950 Date 2023       Current Admission Date: 1/3/2023  Current Admission Diagnosis:Headache   Patient Active Problem List    Diagnosis Date Noted   • Headache 2023   • Hypothyroid 2023   • Urinary incontinence 2022   • S/P foot surgery 2022   • UTI (urinary tract infection) 2022   • Hypotension 2022   • Urinary retention 2022   • Abnormal thyroid blood test 2022   • Abdominal pain 2022   • Diabetic nephropathy associated with type 2 diabetes mellitus ([email or web address] ) 2022   • Other chest pain 2021   • COVID-19 virus infection 2021   • Chronic constipation 2021   • History of colon polyps 2021   • BMI 29 0-29 9,adult 2021   • Epigastric abdominal pain with severe diabetic gastroparesis, status post EGD/Botox injection, 2021   • COPD (chronic obstructive pulmonary disease) ([facility] ) 2021   • Anemia in stage 4 chronic kidney disease ([facility] ) 2021   • B12 neuropathy ([facility] ) 2021   • Depression, recurrent ([facility] ) 2021   • Chronic respiratory failure with hypoxia ([facility] ) 2021   • Gastroparesis diabeticorum ([facility] ) 2021   • Ambulatory dysfunction 10/22/2020   • Memory changes 10/22/2020   • History of kidney stones 2020   • Urinary urgency 2020   • HTN (hypertension) 2020   • Type 2 diabetes mellitus with diabetic chronic kidney disease ([facility] ) 2020   • Fibromyalgia 2020   • ANDRA (acute kidney injury) ([facility] ) 2020   • Dyslipidemia 2020   • Vitamin D deficiency 2020   • Anemia of chronic renal failure, stage 3 (moderate) ([facility] ) 2020   • History of repair of hiatal hernia 2020   • Acute respiratory failure with hypoxia ([place] [address] ) 2020   • Dyspnea    • Pulmonary hypertension ([place] [address] ) 2019   • Type 2 diabetes mellitus with diabetic nephropathy, with long-term current use of insulin ([facility] ) 06/27/2019   • Persistent proteinuria 06/25/2019   • BARBARA (obstructive sleep apnea) 04/09/2019   • RLS (restless legs syndrome) 04/09/2019   • Stage 4 chronic kidney disease ([facility] ) 09/19/2018   • Hypertensive chronic kidney disease with stage 1 through stage 4 chronic kidney disease, or unspecified chronic kidney disease 09/19/2018      LOS (days): 1  Geometric Mean LOS (GMLOS) (days): 2 80  Days to GMLOS:1 7     OBJECTIVE:    Risk of Unplanned Readmission Score: 29 98         Current admission status: Inpatient       Preferred Pharmacy:   [facility] [address] [place] [address]  Phone: [phone] Fax: [phone]    Primary Care Provider: Migel Diana MD    Primary Insurance: MEDICARE  Secondary Insurance: COMMERCIAL MISCELLANEOUS    ASSESSMENT:  Yenny Van Proxies    There are no active Health Care Proxies on file  Patient Information  Admitted from[de-identified] Home  Mental Status: Alert  During Assessment patient was accompanied by: Spouse  Assessment information provided by[de-identified] Spouse  Primary Caregiver: Self  Support Systems: Spouse/significant other, Son  [place] of Residence: [address] [place] [address] do you live in?: [place] entry access options   Select all that apply : No steps to enter home  Type of Current Residence: 2 story home  Upon entering residence, is there a bedroom on the main floor (no further steps)?: Yes  Upon entering residence, is there a bathroom on the main floor (no further steps)?: Yes  In the last 12 months, was there a time when you were not able to pay the mortgage or rent on time?: No  In the last 12 months, was there a time when you did not have a steady place to sleep or slept in a shelter (including now)?: No  Living Arrangements: Lives w/ Spouse/significant other, Lives w/ Son    Activities of Daily Living Prior to Admission  Functional Status: Independent  Completes ADLs independently?: Yes  Ambulates independently?: Yes  Does patient use assisted devices?: Yes  Assisted Devices (DME) used: Other (Comment) (raised toilet seat  built in shower seat in walk-in shower )  Does patient currently own DME?: Yes  What DME does the patient currently own?: Other (Comment), Verdie Slice (raised toilet seat  built in shower seat in walk-in shower )  Does patient have a history of Outpatient Therapy (PT/OT)?: No  Does the patient have a history of Short-Term Rehab?: Yes  Does patient have a history of HHC?: Yes  Does patient currently have [address]?: No         Patient Information Continued  Within the past 12 months, you worried that your food would run out before you got the money to buy more : Never true  Within the past 12 months, the food you bought just didn't last and you didn't have money to get more : Never true         Means of Transportation  Means of Transport to Appts[de-identified] Family transport  In the past 12 months, has lack of transportation kept you from medical appointments or from getting medications?: No  In the past 12 months, has lack of transportation kept you from meetings, work, or from getting things needed for daily living?: No        DISCHARGE DETAILS:    Discharge planning discussed with[de-identified] patient and pt's spouse  Freedom of Choice: Yes  Comments - Freedom of Choice: pt and spouse relayed no preference for [address] - open to blanket referral  CM contacted family/caregiver?: Yes  Were Treatment Team discharge recommendations reviewed with patient/caregiver?: Yes  Did patient/caregiver verbalize understanding of patient care needs?: Yes  Were patient/caregiver advised of the risks associated with not following Treatment Team discharge recommendations?: Yes    Contacts  Patient Contacts: Britney Orellana (spouse)  Relationship to Patient[de-identified] Family  Contact Method:  In Person  Reason/Outcome: Emergency Contact, Discharge Planning, Continuity of [facility]         Is the patient interested in [facility] at discharge?: Yes  [address] requested[de-identified] Occupational Therapy, Physical [address] Name[de-identified] Other  [address] [place] [address] Rd Provider[de-identified] PCP  Home Health Services Needed[de-identified] Strengthening/Theraputic Exercises to Improve Function, Evaluate Functional Status and Safety, Gait/ADL Training  Homebound Criteria Met[de-identified] Requires the Assistance of Another Person for Safe Ambulation or to Leave the Home, Uses an Assist Device (i e  cane, walker, etc)  Supporting Clincal Findings[de-identified] Limited Endurance, Fatigues Easliy in [employer]    DME Referral Provided  Referral made for DME?: No    Other Referral/Resources/Interventions Provided:  Interventions: [facility]  Referral Comments: PT/OT rcmd [facility]  Pt and spouse relayed no preference for [facility] - blanket referral sent via aidin, awaiting responses      Would you like to participate in our [address] service program?  : No - Declined    Treatment Team Recommendation: Home with [address]  Discharge Destination Plan[de-identified] Home with [place] at Discharge : Family

## 2023-01-05 NOTE — PHYSICAL THERAPY NOTE
PHYSICAL THERAPY CANCELLATION NOTE    Patient Name: Kwesi Wiggins  [id number] Date: 1/5/2023 01/05/23 1301   Note Type   Note type Cancelled Session   Additional Comments PT orders received, chart review performed  Pt currently off floor for biopsy   Will hold for PT monalisa Fleming, PT, DPT

## 2023-01-05 NOTE — ANESTHESIA POSTPROCEDURE EVALUATION
Post-Op Assessment Note    CV Status:  Stable  Pain Score: 0    Pain management: adequate     Mental Status:  Alert   Hydration Status:  Euvolemic   PONV Controlled:  None   Airway Patency:  Patent      Post Op Vitals Reviewed: Yes      Staff: CRNA         No notable events documented      /76 (01/05/23 1523)    Temp (!) 97 2 °F (36 2 °C) (01/05/23 1523)    Pulse 70 (01/05/23 1523)   Resp (!) 10 (01/05/23 1523)    SpO2 98 % (01/05/23 1523)

## 2023-01-05 NOTE — PROGRESS NOTES
Progress Note - Vascular Surgery  : [facility] Vascular Surgery on Helio Lundberg 67 y o  female MRN: [id number]  Unit/Bed#: OR POOL Encounter: [id number]    Assessment:  67 y o  female with L temporal headache POD 1 s/p L temporal artery biopsy       Plan:  Biopsy results to be followed up on by neurology primary team  Care per primary/neuro  Please feel free to call with any questions    Subjective/Objective     Subjective: No acute complaints      Objective:       Physical Exam:  GEN: NAD  HEENT: L temporal artery biopsy incision CDI  CV: RRR  Lung: Normal effort  Ab: Soft, ND/NT   Neuro: A+Ox3         Vitals:  /78   Pulse 66   Temp (!) 97 2 °F (36 2 °C) (Temporal)   Resp 14   Ht 5' 2" (1 575 m)   Wt 75 kg (165 lb 5 5 oz)   SpO2 94%   BMI 30 24 kg/m²     I/Os:  I/O last 3 completed shifts: In: 48 [IV Piggyback:50]  Out: -   No intake/output data recorded      Lab Results and Cultures:   Lab Results   Component Value Date    WBC 13 74 (H) 01/04/2023    HGB 12 5 01/04/2023    HCT 38 0 01/04/2023    MCV 88 01/04/2023     01/04/2023     Lab Results   Component Value Date    GLUCOSE 168 (H) 11/30/2018    CALCIUM 9 1 01/04/2023    K 3 7 01/04/2023    CO2 28 01/04/2023    CL 99 01/04/2023    BUN 58 (H) 01/04/2023    CREATININE 1 88 (H) 01/04/2023     Lab Results   Component Value Date    INR 0 93 07/20/2022    INR 0 94 05/17/2021    INR 0 96 06/19/2020    PROTIME 12 4 07/20/2022    PROTIME 12 6 05/17/2021    PROTIME 12 2 06/19/2020        Blood Culture:   Lab Results   Component Value Date    BLOODCX No Growth at 24 hrs  01/03/2023   ,   Urinalysis:   Lab Results   Component Value Date    COLORU Yellow 01/03/2023    CLARITYU Clear 01/03/2023    SPECGRAV 1 016 01/03/2023    PHUR 6 0 01/03/2023    PHUR 6 0 04/13/2022    LEUKOCYTESUR Negative 01/03/2023    NITRITE Positive (A) 01/03/2023    GLUCOSEU Negative 01/03/2023    KETONESU Negative 01/03/2023    BILIRUBINUR Negative 01/03/2023    BLOODU Negative 01/03/2023   ,   Urine Culture:   Lab Results   Component Value Date    URINECX >100,000 cfu/ml Enterobacter cloacae complex (A) 01/03/2023   ,   Wound Culure: No results found for: WOUNDCULT    Medications:  Current Facility-Administered Medications   Medication Dose Route Frequency   • [MAR Hold] acetaminophen (TYLENOL) tablet 650 mg  650 mg Oral Q6H PRN   • [MAR Hold] albuterol (PROVENTIL HFA,VENTOLIN HFA) inhaler 2 puff  2 puff Inhalation Q6H PRN   • [MAR Hold] amLODIPine (NORVASC) tablet 5 mg  5 mg Oral Daily   • [MAR Hold] atorvastatin (LIPITOR) tablet 40 mg  40 mg Oral Daily With Dinner   • [MAR Hold] calcium carbonate (OYSTER SHELL,OSCAL) 500 mg tablet 1 tablet  1 tablet Oral Daily With Breakfast   • [MAR Hold] calcium carbonate (TUMS) chewable tablet 1,000 mg  1,000 mg Oral Q4H PRN   • [MAR Hold] cefepime (MAXIPIME) IVPB (premix in dextrose) 1,000 mg 50 mL  1,000 mg Intravenous Q12H   • [MAR Hold] cholecalciferol (VITAMIN D3) tablet 400 Units  400 Units Oral Daily   • [MAR Hold] DULoxetine (CYMBALTA) delayed release capsule 60 mg  60 mg Oral Daily   • [MAR Hold] enoxaparin (LOVENOX) subcutaneous injection 30 mg  30 mg Subcutaneous Daily   • [MAR Hold] estradiol (ESTRACE) vaginal cream 1 g  1 g Vaginal Once per day on Mon Wed Fri   • [MAR Hold] famotidine (PEPCID) tablet 20 mg  20 mg Oral HS   • [MAR Hold] gabapentin (NEURONTIN) capsule 300 mg  300 mg Oral HS   • [MAR Hold] insulin detemir (LEVEMIR) subcutaneous injection 40 Units  40 Units Subcutaneous QPM   • [MAR Hold] insulin lispro (HumaLOG) 100 units/mL subcutaneous injection 1-5 Units  1-5 Units Subcutaneous TID AC   • [MAR Hold] insulin lispro (HumaLOG) 100 units/mL subcutaneous injection 16 Units  16 Units Subcutaneous Daily With Breakfast   • [MAR Hold] insulin lispro (HumaLOG) 100 units/mL subcutaneous injection 16 Units  16 Units Subcutaneous Daily With Lunch   • [MAR Hold] insulin lispro (HumaLOG) 100 units/mL subcutaneous injection 25 Units  25 Units Subcutaneous Daily With Dinner   • [MAR Hold] levothyroxine tablet 112 mcg  112 mcg Oral Early Morning   • [MAR Hold] losartan (COZAAR) tablet 25 mg  25 mg Oral Daily   • [MAR Hold] losartan (COZAAR) tablet 50 mg  50 mg Oral Daily   • [MAR Hold] methylPREDNISolone sodium succinate (Solu-MEDROL) 500 mg in sodium chloride 0 9 % 250 mL IVPB  500 mg Intravenous Daily   • [MAR Hold] metoprolol tartrate (LOPRESSOR) tablet 50 mg  50 mg Oral Q12H [address]   • [MAR Hold] multivitamin stress formula tablet 1 tablet  1 tablet Oral Daily   • [MAR Hold] pantoprazole (PROTONIX) EC tablet 40 mg  40 mg Oral BID   • [MAR Hold] pramipexole (MIRAPEX) tablet 0 25 mg  0 25 mg Oral HS   • [MAR Hold] saccharomyces boulardii (FLORASTOR) capsule 250 mg  250 mg Oral BID   • [MAR Hold] torsemide (DEMADEX) tablet 20 mg  20 mg Oral Daily           Paul Alvarez MD  1/5/2023

## 2023-01-05 NOTE — ASSESSMENT & PLAN NOTE
Lab Results   Component Value Date    [id number] 1 884 01/04/2023    FREET4 1 16 03/31/2022     Continue levothyroxine 112

## 2023-01-05 NOTE — ASSESSMENT & PLAN NOTE
Patient presented with headache starting approximately 5 days ago  She came to the emergency department on 1/1/23 and was discharged with Fioricet with some improvement in her headache  She returns today with worsening headache with associated left temporal tenderness  He also has some associated confusion  She was found to have mildly elevated ESR of 56 and out of concern for GCA she was given prednisone in the emergency department  Patient also reports some shoulder and low back pain but may be secondary to osteoarthritis  Plan:  · Continue with high-dose IV Solu-Medrol Day 2/3 days followed by prednisone po  · ESR and CRP are elevated raising concern for GCA  · Neurology consult appreciate recommendations  · Patient scheduled for temporal artery biopsy this afternoon  · N p o  for procedure

## 2023-01-06 PROBLEM — B02.9 HERPES ZOSTER WITHOUT COMPLICATION: Status: ACTIVE | Noted: 2023-01-06

## 2023-01-06 LAB
ANION GAP SERPL CALCULATED.3IONS-SCNC: 9 MMOL/L (ref 4–13)
BACTERIA UR CULT: ABNORMAL
BASOPHILS # BLD AUTO: 0.05 THOUSANDS/ÂΜL (ref 0–0.1)
BASOPHILS NFR BLD AUTO: 0 % (ref 0–1)
BUN SERPL-MCNC: 58 MG/DL (ref 5–25)
CALCIUM SERPL-MCNC: 7.4 MG/DL (ref 8.4–10.2)
CHLORIDE SERPL-SCNC: 101 MMOL/L (ref 96–108)
CO2 SERPL-SCNC: 26 MMOL/L (ref 21–32)
CREAT SERPL-MCNC: 2.12 MG/DL (ref 0.6–1.3)
EOSINOPHIL # BLD AUTO: 0 THOUSAND/ÂΜL (ref 0–0.61)
EOSINOPHIL NFR BLD AUTO: 0 % (ref 0–6)
ERYTHROCYTE [DISTWIDTH] IN BLOOD BY AUTOMATED COUNT: 15.3 % (ref 11.6–15.1)
GFR SERPL CREATININE-BSD FRML MDRD: 22 ML/MIN/1.73SQ M
GLUCOSE SERPL-MCNC: 114 MG/DL (ref 65–140)
GLUCOSE SERPL-MCNC: 115 MG/DL (ref 65–140)
GLUCOSE SERPL-MCNC: 122 MG/DL (ref 65–140)
GLUCOSE SERPL-MCNC: 131 MG/DL (ref 65–140)
GLUCOSE SERPL-MCNC: 131 MG/DL (ref 65–140)
HCT VFR BLD AUTO: 37.4 % (ref 34.8–46.1)
HGB BLD-MCNC: 12.4 G/DL (ref 11.5–15.4)
IMM GRANULOCYTES # BLD AUTO: 0.2 THOUSAND/UL (ref 0–0.2)
IMM GRANULOCYTES NFR BLD AUTO: 1 % (ref 0–2)
LYMPHOCYTES # BLD AUTO: 1.3 THOUSANDS/ÂΜL (ref 0.6–4.47)
LYMPHOCYTES NFR BLD AUTO: 7 % (ref 14–44)
MCH RBC QN AUTO: 29.2 PG (ref 26.8–34.3)
MCHC RBC AUTO-ENTMCNC: 33.2 G/DL (ref 31.4–37.4)
MCV RBC AUTO: 88 FL (ref 82–98)
MONOCYTES # BLD AUTO: 0.93 THOUSAND/ÂΜL (ref 0.17–1.22)
MONOCYTES NFR BLD AUTO: 5 % (ref 4–12)
NEUTROPHILS # BLD AUTO: 16.96 THOUSANDS/ÂΜL (ref 1.85–7.62)
NEUTS SEG NFR BLD AUTO: 87 % (ref 43–75)
NRBC BLD AUTO-RTO: 0 /100 WBCS
PLATELET # BLD AUTO: 243 THOUSANDS/UL (ref 149–390)
PMV BLD AUTO: 11.4 FL (ref 8.9–12.7)
POTASSIUM SERPL-SCNC: 3.4 MMOL/L (ref 3.5–5.3)
RBC # BLD AUTO: 4.25 MILLION/UL (ref 3.81–5.12)
SODIUM SERPL-SCNC: 136 MMOL/L (ref 135–147)
WBC # BLD AUTO: 19.44 THOUSAND/UL (ref 4.31–10.16)

## 2023-01-06 RX ORDER — PREDNISOLONE ACETATE 10 MG/ML
1 SUSPENSION/ DROPS OPHTHALMIC 3 TIMES DAILY
Status: DISCONTINUED | OUTPATIENT
Start: 2023-01-06 | End: 2023-01-07 | Stop reason: HOSPADM

## 2023-01-06 RX ORDER — VALACYCLOVIR HYDROCHLORIDE 500 MG/1
1000 TABLET, FILM COATED ORAL EVERY 24 HOURS
Status: DISCONTINUED | OUTPATIENT
Start: 2023-01-06 | End: 2023-01-07 | Stop reason: HOSPADM

## 2023-01-06 RX ORDER — POLYETHYLENE GLYCOL 3350 17 G/17G
17 POWDER, FOR SOLUTION ORAL DAILY
Status: DISCONTINUED | OUTPATIENT
Start: 2023-01-06 | End: 2023-01-07 | Stop reason: HOSPADM

## 2023-01-06 RX ORDER — DOCUSATE SODIUM 100 MG/1
100 CAPSULE, LIQUID FILLED ORAL 2 TIMES DAILY
Status: DISCONTINUED | OUTPATIENT
Start: 2023-01-06 | End: 2023-01-07 | Stop reason: HOSPADM

## 2023-01-06 RX ORDER — VALACYCLOVIR HYDROCHLORIDE 500 MG/1
1000 TABLET, FILM COATED ORAL EVERY 8 HOURS SCHEDULED
Status: DISCONTINUED | OUTPATIENT
Start: 2023-01-06 | End: 2023-01-06 | Stop reason: DRUGHIGH

## 2023-01-06 RX ORDER — GABAPENTIN 300 MG/1
300 CAPSULE ORAL 3 TIMES DAILY
Status: DISCONTINUED | OUTPATIENT
Start: 2023-01-06 | End: 2023-01-07 | Stop reason: HOSPADM

## 2023-01-06 RX ADMIN — INSULIN DETEMIR 50 UNITS: 100 INJECTION, SOLUTION SUBCUTANEOUS at 18:30

## 2023-01-06 RX ADMIN — VALACYCLOVIR HYDROCHLORIDE 1000 MG: 500 TABLET, FILM COATED ORAL at 01:16

## 2023-01-06 RX ADMIN — PREDNISOLONE ACETATE 1 DROP: 10 SUSPENSION/ DROPS OPHTHALMIC at 18:26

## 2023-01-06 RX ADMIN — CEFEPIME HYDROCHLORIDE 1000 MG: 1 INJECTION, SOLUTION INTRAVENOUS at 10:51

## 2023-01-06 RX ADMIN — POLYETHYLENE GLYCOL 3350 17 G: 17 POWDER, FOR SOLUTION ORAL at 12:40

## 2023-01-06 RX ADMIN — METOPROLOL TARTRATE 50 MG: 50 TABLET, FILM COATED ORAL at 09:16

## 2023-01-06 RX ADMIN — PREDNISOLONE ACETATE 1 DROP: 10 SUSPENSION/ DROPS OPHTHALMIC at 21:06

## 2023-01-06 RX ADMIN — LOSARTAN POTASSIUM 25 MG: 25 TABLET, FILM COATED ORAL at 09:19

## 2023-01-06 RX ADMIN — LOSARTAN POTASSIUM 50 MG: 50 TABLET, FILM COATED ORAL at 09:16

## 2023-01-06 RX ADMIN — FAMOTIDINE 20 MG: 20 TABLET, FILM COATED ORAL at 21:03

## 2023-01-06 RX ADMIN — AMLODIPINE BESYLATE 5 MG: 5 TABLET ORAL at 09:16

## 2023-01-06 RX ADMIN — DULOXETINE HYDROCHLORIDE 60 MG: 60 CAPSULE, DELAYED RELEASE ORAL at 09:16

## 2023-01-06 RX ADMIN — Medication 250 MG: at 09:20

## 2023-01-06 RX ADMIN — INSULIN LISPRO 16 UNITS: 100 INJECTION, SOLUTION INTRAVENOUS; SUBCUTANEOUS at 09:22

## 2023-01-06 RX ADMIN — ATORVASTATIN CALCIUM 40 MG: 40 TABLET, FILM COATED ORAL at 17:37

## 2023-01-06 RX ADMIN — Medication 250 MG: at 21:05

## 2023-01-06 RX ADMIN — GABAPENTIN 300 MG: 300 CAPSULE ORAL at 12:40

## 2023-01-06 RX ADMIN — INSULIN LISPRO 16 UNITS: 100 INJECTION, SOLUTION INTRAVENOUS; SUBCUTANEOUS at 12:46

## 2023-01-06 RX ADMIN — LEVOTHYROXINE SODIUM 112 MCG: 112 TABLET ORAL at 05:33

## 2023-01-06 RX ADMIN — GABAPENTIN 300 MG: 300 CAPSULE ORAL at 17:37

## 2023-01-06 RX ADMIN — CHOLECALCIFEROL TAB 10 MCG (400 UNIT) 400 UNITS: 10 TAB at 09:17

## 2023-01-06 RX ADMIN — ENOXAPARIN SODIUM 30 MG: 30 INJECTION SUBCUTANEOUS at 09:16

## 2023-01-06 RX ADMIN — TORSEMIDE 20 MG: 20 TABLET ORAL at 09:21

## 2023-01-06 RX ADMIN — GABAPENTIN 300 MG: 300 CAPSULE ORAL at 21:04

## 2023-01-06 RX ADMIN — CALCIUM 1 TABLET: 500 TABLET ORAL at 09:16

## 2023-01-06 RX ADMIN — ACETAMINOPHEN 650 MG: 325 TABLET, FILM COATED ORAL at 04:05

## 2023-01-06 RX ADMIN — DOCUSATE SODIUM 100 MG: 100 CAPSULE, LIQUID FILLED ORAL at 17:37

## 2023-01-06 RX ADMIN — SODIUM CHLORIDE, SODIUM LACTATE, POTASSIUM CHLORIDE, AND CALCIUM CHLORIDE 100 ML/HR: .6; .31; .03; .02 INJECTION, SOLUTION INTRAVENOUS at 18:26

## 2023-01-06 RX ADMIN — PANTOPRAZOLE SODIUM 40 MG: 40 TABLET, DELAYED RELEASE ORAL at 09:31

## 2023-01-06 RX ADMIN — PANTOPRAZOLE SODIUM 40 MG: 40 TABLET, DELAYED RELEASE ORAL at 17:37

## 2023-01-06 RX ADMIN — DOCUSATE SODIUM 100 MG: 100 CAPSULE, LIQUID FILLED ORAL at 12:41

## 2023-01-06 RX ADMIN — VALACYCLOVIR HYDROCHLORIDE 1000 MG: 500 TABLET, FILM COATED ORAL at 23:16

## 2023-01-06 RX ADMIN — METOPROLOL TARTRATE 50 MG: 50 TABLET, FILM COATED ORAL at 21:04

## 2023-01-06 RX ADMIN — PRAMIPEXOLE DIHYDROCHLORIDE 0.25 MG: 0.25 TABLET ORAL at 21:03

## 2023-01-06 RX ADMIN — B-COMPLEX W/ C & FOLIC ACID TAB 1 TABLET: TAB at 09:16

## 2023-01-06 RX ADMIN — SODIUM CHLORIDE, SODIUM LACTATE, POTASSIUM CHLORIDE, AND CALCIUM CHLORIDE 100 ML/HR: .6; .31; .03; .02 INJECTION, SOLUTION INTRAVENOUS at 09:10

## 2023-01-06 RX ADMIN — INSULIN LISPRO 25 UNITS: 100 INJECTION, SOLUTION INTRAVENOUS; SUBCUTANEOUS at 18:26

## 2023-01-06 NOTE — PLAN OF CARE
Problem: PHYSICAL THERAPY ADULT  Goal: Performs mobility at highest level of function for planned discharge setting  See evaluation for individualized goals  Description: Treatment/Interventions: Functional transfer training, LE strengthening/ROM, Therapeutic exercise, Endurance training, Patient/family training, Equipment eval/education, Bed mobility, Gait training  Equipment Recommended: Mary Jo De La Rosa (pt has rollator walker)       See flowsheet documentation for full assessment, interventions and recommendations  1/6/2023 1558 by Roshan Valle PT  Note: Prognosis: Good  Problem List: Decreased strength, Impaired balance, Decreased mobility, Decreased safety awareness  Assessment: Rashi Sexton is a 67 y o  Female who [facility]3/2023 due to fall and diagnosis of herpes zoster w/o complication  Orders for PT eval and treat received, w/ activity orders of up w/ assist and contact precautions  Comorbidities affecting pt at time of evaluation include: HTN, DM, CKD, fibromyalgia, memory changes, depression, h/o COVID-19, COPD  Personal factors affecting DC include: inability to navigate community distances, positive fall history and inability to perform IADLs  At baseline, pt mobilizes independently w/ rollator walker prn, and w/ 1 fall(s) in the previous 6 months  Upon evaluation, pt presents w/ the following deficits: weakness, impaired skin integrity, impaired balance and gait deviations  Pt currently requires mod I for bed mobility, supervision for transfers, and supervision w/ rollator walker for ambulation  Pt's clinical presentation is unstable/unpredictable due to need for input for mobility technique/safety, recent drastic decline in mobility compared to baseline and recent history of falls  From a PT/mobility standpoint, given the above findings, DC recommendation is: Home w/ HHPT   During current admission, pt will benefit from continued skilled inpatient PT in the acute care setting in order to address the above deficits and to maximize function and mobility prior to DC from acute care  PT Discharge Recommendation: Home with home health rehabilitation    See flowsheet documentation for full assessment

## 2023-01-06 NOTE — PROGRESS NOTES
[facility]  Progress Note [facility] 1950, 67 y o  female MRN: [id number]  Unit/Bed#: S -01 Encounter: [id number]  Primary Care Provider: Dirk Will MD   Date and time admitted to hospital: 1/3/2023  4:32 AM    * Herpes zoster without complication  Assessment & Plan  Patient broke out in a rash overnight 1/5- 1/6/2023 patient broke out in a rash on limited to the left side of her face and abdomen dermatome pattern involving the left eye  Patient denies visual changes  Plan:  Valtrex 1 GQ 20 4H due to renal dosing  LR  cc/H  Crease gabapentin to 300 mg p o  3 times daily  DC steroids  Ophthalmology consult due to left eye involvement  Creatinine level and CBC  Headache  Assessment & Plan  Patient presented with headache starting approximately 5 days ago  She came to the emergency department on 1/1/23 and was discharged with Fioricet with some improvement in her headache  She returns today with worsening headache with associated left temporal tenderness  He also has some associated confusion  She was found to have mildly elevated ESR of 56 and out of concern for GCA she was given prednisone in the emergency department  Patient also reports some shoulder and low back pain but may be secondary to osteoarthritis  Plan:  · DC steroids  · Tylenol as needed    Hypothyroid  Assessment & Plan  Lab Results   Component Value Date    [id number] 1 884 01/04/2023    FREET4 1 16 03/31/2022     Continue levothyroxine 112    UTI (urinary tract infection)  Assessment & Plan  Patient with suprapubic pain and UA showing normal bacteria and 4-10 WBCs  Patient has history of UTI with Enterobacter cloacae  Patient also has mildly elevated WBC at 12 and some confusion oriented to person only  Given suprapubic tenderness and leukocytosis with positive UA, will treat for suspected UTI  Previous cultures show susceptibility to cefepime      Plan:  · Cefepime 1 g every 12 hours  · Follow-up urine culture  · Patient does have intermittent urinary retention and recurrent UTIs  Has followed up with urologist as an outpatient and was recommended Estrace cream 3 times weekly which she was not using consistently  Will recommend close outpatient urology follow-up for further management  HTN (hypertension)  Assessment & Plan  Blood Pressure: 111/54    Patient with hypertension, continue with home regimen  Torsemide 20 mg, metoprolol 50 mg twice daily, amlodipine 5 mg, 20 mg olmesartan a m  and 10 mg p m  (formulary replacement with losartan)    Plan:  · Continue torsemide  · Continue metoprolol  · Continue amlodipine  · Losartan 50 mg a m , 25 mg p m  Type 2 diabetes mellitus with diabetic chronic kidney disease [facility])  Assessment & Plan  Lab Results   Component Value Date    HGBA1C 6 7 (H) 11/04/2022       Recent Labs     01/05/23  2133 01/05/23  2323 01/06/23  0722 01/06/23  1119   POCGLU 419* 278* 122 131       Blood Sugar Average: Last 72 hrs:  (P) 442 [id number]'s home diabetes regimen is NovoLog with meals  16 units with breakfast, 16 units with lunch, and 25 units with dinner  Levemir 50 units at bedtime  Initially had hypoglycemia on admission however subsequently has hyperglycemia on IV steroids  Plan:  · Continue home regimen of Humalog 16 units with breakfast, 16 units with lunch, 25 units with dinner  · Continue with detemir 50 units at bedtime  Patient will be n p o  past midnight for temporal artery biopsy tomorrow  · Sliding scale insulin  · Carb controlled diet  · May need further titration of insulin based on blood glucose control  VTE Pharmacologic Prophylaxis:   VTE Score: 3 Moderate Risk (Score 3-4) - Pharmacological DVT Prophylaxis Ordered: Enoxaparin (Lovenox)  Mechanical VTE Prophylaxis in Place: Yes    Patient Centered Rounds: I have performed bedside rounds with nursing staff today      Discussions with Specialists or Other Care Team Provider: Neurology team     Education and Discussions with Family / Patient: Updated  () via phone  Current Length of Stay: 2 day(s)    Current Patient Status: Inpatient     Discharge Plan / Estimated Discharge Date: Anticipate discharge tomorrow to home  Code Status: Level 3 - DNAR and DNI      Subjective:   Patient was awake and sitting up in bed  She states that her headache is better but still has slight numbness tingling sensation over her rash on the left forehead  She denies any blurry vision any foreign body sensation in the eye  She denies fever, chills, HA, CP, SOB,  palpitations, Abd pain, n/v/d  Objective:     Vitals:   Temp (24hrs), Av 8 °F (36 6 °C), Min:97 2 °F (36 2 °C), Max:98 7 °F (37 1 °C)    Temp:  [97 2 °F (36 2 °C)-98 7 °F (37 1 °C)] 98 3 °F (36 8 °C)  HR:  [59-70] 59  Resp:  [18] 18  BP: (111-143)/(54-76) 111/54  SpO2:  [91 %-99 %] 91 %  Body mass index is 30 24 kg/m²  Input and Output Summary (last 24 hours): Intake/Output Summary (Last 24 hours) at 2023 1347  Last data filed at 2023 0910  Gross per 24 hour   Intake 1700 ml   Output --   Net 1700 ml       Physical Exam:     Physical Exam  Vitals and nursing note reviewed  Constitutional:       General: She is not in acute distress  Appearance: Normal appearance  She is not ill-appearing, toxic-appearing or diaphoretic  HENT:      Head: Normocephalic and atraumatic  Nose: Nose normal    Eyes:      General: No scleral icterus  Right eye: No discharge  Left eye: No discharge  Extraocular Movements: Extraocular movements intact  Pupils: Pupils are equal, round, and reactive to light  Neck:      Vascular: No carotid bruit  Cardiovascular:      Rate and Rhythm: Normal rate and regular rhythm  Pulses: Normal pulses  Heart sounds: Normal heart sounds  No murmur heard  No friction rub  No gallop     Pulmonary:      Effort: Pulmonary effort is normal       Breath sounds: Normal breath sounds  No wheezing, rhonchi or rales  Abdominal:      General: Bowel sounds are normal       Palpations: Abdomen is soft  Tenderness: There is no abdominal tenderness  There is no guarding or rebound  Musculoskeletal:         General: No swelling or tenderness  Cervical back: Normal range of motion and neck supple  No rigidity  No muscular tenderness  Right lower leg: No edema  Left lower leg: No edema  Skin:     Capillary Refill: Capillary refill takes less than 2 seconds  Coloration: Skin is not jaundiced  Findings: Rash (Left forehead in dermatome pattern extending to the left eye  Rash is vesicular with no pustulous discharge ) present  No bruising, erythema or lesion  Neurological:      General: No focal deficit present  Mental Status: She is alert and oriented to person, place, and time  Motor: No weakness  Gait: Gait normal    Psychiatric:         Mood and Affect: Mood normal          Behavior: Behavior normal          Thought Content: Thought content normal          Judgment: Judgment normal           Additional Data:     Labs:  Results from last 7 days   Lab Units 01/06/23  1211   WBC Thousand/uL 19 44*   HEMOGLOBIN g/dL 12 4   HEMATOCRIT % 37 4   PLATELETS Thousands/uL 243   NEUTROS PCT % 87*   LYMPHS PCT % 7*   MONOS PCT % 5   EOS PCT % 0     Results from last 7 days   Lab Units 01/06/23  1211 01/04/23  0739 01/03/23  0443   SODIUM mmol/L 136   < > 139   POTASSIUM mmol/L 3 4*   < > 4 0   CHLORIDE mmol/L 101   < > 100   CO2 mmol/L 26   < > 29   BUN mg/dL 58*   < > 40*   CREATININE mg/dL 2 12*   < > 1 50*   ANION GAP mmol/L 9   < > 10   CALCIUM mg/dL 7 4*   < > 10 6*   ALBUMIN g/dL  --   --  4 3   TOTAL BILIRUBIN mg/dL  --   --  0 49   ALK PHOS U/L  --   --  75   ALT U/L  --   --  140*   AST U/L  --   --  64*   GLUCOSE RANDOM mg/dL 114   < > 78    < > = values in this interval not displayed  Results from last 7 days   Lab Units 01/06/23  1119 01/06/23  0722 01/05/23  2323 01/05/23  2133 01/05/23  2040 01/05/23  1630 01/05/23  1128 01/05/23  0629 01/04/23  2118 01/04/23  2001 01/04/23  1605 01/04/23  1142   POC GLUCOSE mg/dl 131 122 278* 419* 408* 194* 220* 197* 397* 328* 176* 280*         Results from last 7 days   Lab Units 01/03/23  0805   LACTIC ACID mmol/L 0 7       Imaging: Reviewed radiology reports from this admission including: ultrasound(s)    Recent Cultures (last 7 days):     Results from last 7 days   Lab Units 01/03/23  1144 01/03/23  0812 01/03/23  0805   BLOOD CULTURE   --  No Growth at 48 hrs  No Growth at 48 hrs     URINE CULTURE  >100,000 cfu/ml Enterobacter cloacae complex*  --   --        Lines/Drains:  Invasive Devices     Peripheral Intravenous Line  Duration           Peripheral IV 01/06/23 Right Forearm <1 day                Telemetry:        Last 24 Hours Medication List:   Current Facility-Administered Medications   Medication Dose Route Frequency Provider Last Rate   • acetaminophen  650 mg Oral Q6H PRN Daksha Parish MD     • albuterol  2 puff Inhalation Q6H PRN Daksha Parish MD     • amLODIPine  5 mg Oral Daily Daksha Parish MD     • atorvastatin  40 mg Oral Daily With King Carias MD     • calcium carbonate  1 tablet Oral Daily With Breakfast Daksha Parish MD     • calcium carbonate  1,000 mg Oral Q4H PRN Daksha Parish MD     • cholecalciferol  400 Units Oral Daily Daksha Parish MD     • docusate sodium  100 mg Oral BID Jamee Weston MD     • DULoxetine  60 mg Oral Daily Daksha Parish MD     • enoxaparin  30 mg Subcutaneous Daily Daksha Parish MD     • estradiol  1 g Vaginal Once per day on Mon Wed Fri Daksha Parish MD     • famotidine  20 mg Oral HS Daksha Parish MD     • gabapentin  300 mg Oral TID Jamee Weston MD     • insulin detemir  50 Units Subcutaneous QPM Nanette Schofield MD     • insulin lispro  1-5 Units Subcutaneous TID AC Tian Arechiga MD     • insulin lispro  16 Units Subcutaneous Daily With Breakfast Tian Arechiga MD     • insulin lispro  16 Units Subcutaneous Daily With Lunch Tian Arechiga MD     • insulin lispro  25 Units Subcutaneous Daily With Dinner Tian Arechiga MD     • lactated ringers  100 mL/hr Intravenous Continuous Tian Arechiga  mL/hr (01/06/23 0910)   • levothyroxine  112 mcg Oral Early Morning Tian Arechiga MD     • losartan  25 mg Oral Daily Tian Arechiga MD     • losartan  50 mg Oral Daily Tian Arechiga MD     • metoprolol tartrate  50 mg Oral Q12H [address] Tian Arechiga MD     • multivitamin stress formula  1 tablet Oral Daily Tian Arechiga MD     • pantoprazole  40 mg Oral BID Tian Arechiga MD     • polyethylene glycol  17 g Oral Daily Alexandra Hair MD     • pramipexole  0 25 mg Oral HS Tian Arechiga MD     • saccharomyces boulardii  250 mg Oral BID Tian Arehciga MD     • torsemide  20 mg Oral Daily Tian Arechiga MD     • valACYclovir  1,000 mg Oral Q24H Susie Hill MD        Nutrition Assessment and Intervention:     Reviewed food recall journal      Physical Activity Assessment and Intervention:    Activity journal reviewed      Emotional and Mental Well-being, Sleep, Connectedness Assessment and Intervention:    Sleep/stress assessment performed      Tobacco and Toxic Substance Assessment and Intervention:     Tobacco use screening performed    Alcohol and drug use screening performed      Today, Patient Was Seen By: Clarance Hodgkins, MD    ** Please Note: This note has been constructed using a voice recognition system   **

## 2023-01-06 NOTE — ASSESSMENT & PLAN NOTE
Patient with suprapubic pain and UA showing normal bacteria and 4-10 WBCs  Patient has history of UTI with Enterobacter cloacae  Patient also has mildly elevated WBC at 12 and some confusion oriented to person only  Given suprapubic tenderness and leukocytosis with positive UA, will treat for suspected UTI  Previous cultures show susceptibility to cefepime  Plan:  · Patient received 3 days of cefepime based on her urine culture and sensitivity results  She was recommended to follow-up with urology as an outpatient because of recurrent UTI  Recommended to continue to use her Estrace cream 3 times weekly

## 2023-01-06 NOTE — CONSULTS
This 54-year-old woman presented to the hospital with headaches in the left temporal region  She was found to have an elevated sed rate and a presumptive diagnosis of giant cell arteritis was made  Subsequently she was started on steroids and a temporal artery biopsy was performed  The following day she developed a left facial rash in a V1 distribution and now it is thought that she has herpes zoster ophthalmicus  She complains of pain in the left eye  She reports her vision is baseline  Past ocular history is significant for LASEK OU  On examination, visual acuity without correction at near is 2070  Pupils are equal round and reactive to light  Extraocular movements are unrestricted  The external examination is significant for a rash in the V1 distribution  The conjunctiva is clear not injected  The cornea is clear  The anterior chambers are formed  Intraocular pressure is 16  The left eye was dilated with Mydriacyl and Chito-Synephrine at 3:45 PM     The media is clear  The optic nerve is pink and flat with physiologic cupping  The macula, vessels and periphery are unremarkable  Impression: Left herpes zoster ophthalmicus  No retinal involvement  Plan: Will treat empirically with Pred forte 3 times a day for 5 days    The patient will follow-up with either myself or her regular eye doctor after discharge if her symptoms persist

## 2023-01-06 NOTE — ASSESSMENT & PLAN NOTE
Patient broke out in a rash overnight 1/5- 1/6/2023 patient broke out in a rash on limited to the left side of her face and abdomen dermatome pattern involving the left eye  Patient denies visual changes  Plan:  Valtrex  1 g daily to complete 10 days of treatment (dose adjusted per creatinine clearance)  Increased gabapentin to 300 mg p o  3 times daily  DC steroids  Ophthalmology consult due to left eye involvement appreciated  Continue with0 eyedrops for 5 days  Patient is scheduled to follow-up with her ophthalmologist next week

## 2023-01-06 NOTE — PROGRESS NOTES
[facility] Attending [facility] - Progress Note    I have seen and examined David Parada personally and have reviewed the medical record independently  I have reviewed the case with the resident physician including all assessments and the plan of care for each  I agree with the resident physician and offer the following addendum to the below statements by the resident physician:     Date Evaluated:1/6/23  Time Evaluated:am rounds  Subjective / HPI: Patient seen and examined  Complains of pain and burning in the left forehead  Complains of some mild blurring of vision in the left eye  Exam:  Vitals:    01/06/23 0722   BP: 111/54   Pulse: 59   Resp: 18   Temp: 98 3 °F (36 8 °C)   SpO2: 91%   Vesicular rash with erythema noted in the left forehead, left eyelid, left side of the scalp  Some swelling of the left eyelid noted  Mild conjunctival erythema noted  Assessment and Plan:  · Intractable headache  · Herpes zoster ophthalmicus  · Type 2 diabetes with hyperglycemia  · Hypothyroidism  · Chronic kidney disease stage 4  · Patient is s/p left temporal artery biopsy  However noted to have vesicular rash which developed overnight involving dermatomal distribution of left trigeminal nerve  Placed on standard precautions and started on valacyclovir for herpes zoster ophthalmicus  Dose adjusted for creatinine clearance  Ophthalmology consultation will be obtained  IV steroids were discontinued  Gabapentin dose was increased to 300 mg 3 times daily  Continue to monitor renal functions closely in the next 24 hours  Patient has CKD stage IV and baseline creatinine is 1 7-2  Patient Centered Rounds: I have performed bedside rounds with nursing staff today  Education and Discussions with Family / Patient: Discussed with patient and  at bedside  Time Spent for Care: 30 minutes    More than 50% of total time spent on counseling and coordination of care as described above     Current Length of Stay: 2 day(s)    Current Patient Status: Inpatient   Certification Statement: The patient will continue to require additional inpatient hospital stay due to Close monitoring of renal functions and toleration of antiviral medications  Discharge Plan / Estimated Discharge Date: Likely in the next 24 hours  For detailed history, assessment, and plan of care, please review the statements below by resident physician      Margeret Bence, MD

## 2023-01-06 NOTE — PHYSICAL THERAPY NOTE
PHYSICAL THERAPY EVALUATION NOTE          Patient Name: Shelly Owens  PCCBO'I Date: 1/6/2023      AGE:   67 y o  Mrn:   [id number]  ADMIT DX:  UTI (urinary tract infection) [N39 0]  Generalized weakness [R53 1]  Headache [R51 9]  Unspecified multiple injuries, initial encounter [T07  XXXA]  AMS (altered mental status) [R41 82]    Past Medical History:  Past Medical History:   Diagnosis Date    Allergic     Allergic rhinitis     Anesthesia     pt reports "had to use double lumen endo tube for hiatal hernia repair/so surgeon could get to where he needed to work"    Arthritis     Aspiration into airway     Basal cell carcinoma 2007    left cheek     BCC (basal cell carcinoma) 05/27/2021    Left Nasal tip    Cancer ([facility] )     squamous cell cancer on forhead    Cancer ([facility] )     basal cell on nose     Chronic kidney disease 2000, 2018    Stones, kidney disease stage 4    Chronic pain disorder     bilat feet and joint pain on occas    Colon polyp     Constipation     COPD (chronic obstructive pulmonary disease) ([facility] )     COVID-19 08/2021    recovered at home/did receive monoclonal infusion    Dental bridge present     Depression     Diabetes mellitus ([facility] )     Type 2    Diabetic neuropathy ([facility] )     Disease of thyroid gland     Family history of thyroid problem     Fatty liver     GERD (gastroesophageal reflux disease)     Heart burn     Hiatal hernia     History of pneumonia     Hyperlipidemia     Hyperplasia, parathyroid ([facility] )     Hypertension     Kidney problem     Kidney stone     Memory loss Julu2 2020    Motion sickness     Motion sickness     Neck pain     Obesity 1978    Obesity (BMI 30 0-34  9)     Pollen allergies     RLS (restless legs syndrome)     SCC (squamous cell carcinoma) 05/04/2021    left mid forehead    Seasonal allergies     Sleep apnea     has inspire    Squamous cell skin cancer 2007    left cheek     Swollen ankles     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, left     great toe fusion    Urinary incontinence     Urinary tract infection 03/28/2022    Wears glasses        Past Surgical History:  Past Surgical History:   Procedure Laterality Date    ARTHROSCOPY KNEE      BREAST BIOPSY      stereotactic-benign    BREAST BIOPSY      stereo-benign    BREAST EXCISIONAL BIOPSY      unknown date-benign    BREAST EXCISIONAL BIOPSY      unknown date-benign    BREAST EXCISIONAL BIOPSY      unknown date-benign    BREAST EXCISIONAL BIOPSY      unknown age-benign    CARDIAC CATHETERIZATION      CHOLECYSTECTOMY      COLONOSCOPY      EXAMINATION UNDER ANESTHESIA N/A 06/24/2021    Procedure: EXAM UNDER ANESTHESIA (EUA), DISE;  Surgeon: Daya Shipley MD;  [facility];  Service: ENT    HERNIA REPAIR      HIATAL HERNIA REPAIR      KNEE SURGERY      Torn maniscus lap surg    LIPOSUCTION      LYMPH NODE BIOPSY      MOHS SURGERY  05/20/2021    left mid forehead-Gautam    MOHS SURGERY Left 05/27/2021    Left nasal tip- gautam     CT LIGATION/BIOPSY TEMPORAL ARTERY Left 1/5/2023    Procedure: BIOPSY ARTERY TEMPORAL;  Surgeon: Pete Esparza DO;  [facility];  Service: Vascular    CT OPEN IMPLANTATION CRANIAL NERVE VASQUEZ & PULSE GEN N/A 11/10/2021    Procedure: INSERTION UPPER AIRWAY STIMULATOR, INSPIRE IMPLANT;  Surgeon: Daya Shipley MD;  [facility];  Service: ENT    CT UNLISTED PROCEDURE FOOT/TOES Right 07/19/2022    Procedure: 1st metatarsal phalangeal joint fusion;  Surgeon: Dennise Guerrero DPM;  Location[facility];  Service: [address] [place] [address] Drive Bilateral 2000    REDUCTION MAMMAPLASTY      SKIN BIOPSY      SKIN CANCER EXCISION  2007    squamous cell carcinoma     SKIN CANCER EXCISION  2007    basal cell carcinoma    SQUAMOUS CELL CARCINOMA EXCISION      TOE SURGERY Right 08/04/2022    Right Great Toe Fusion    TONSILLECTOMY      TUBAL LIGATION      UPPER GASTROINTESTINAL ENDOSCOPY      US GUIDED BREAST BIOPSY RIGHT COMPLETE Right 2022     Length Of Stay: 2        PHYSICAL THERAPY EVALUATION:    Patient's identity confirmed via 2 patient identifiers (full name and ) at start of session       23 1428   PT Last Visit   PT Visit Date 23   Note Type   Note type Evaluation   Pain Assessment   Pain Assessment Tool 0-10   Pain Score No Pain   Restrictions/Precautions   Weight Bearing Precautions Per Order No   Other Precautions Contact/isolation;Cognitive; Chair Alarm; Bed Alarm; Fall Risk;Multiple lines   Home Living   Type of Home House  ([facility])   Home Layout Two level;Performs ADLs on one level; Able to live on main level with bedroom/bathroom  (0 TRISTIN; 1st floor set-up)   Bathroom Shower/Tub Walk-in shower   Bathroom Toilet Raised   Bathroom Equipment Built-in shower seat   Bathroom Accessibility Accessible   Home Equipment Walker;Cane  (rollator walker)   Additional Comments Pt remains on 1st floor of her house   Prior Function   Level of [place] Independent with functional mobility; Independent with ADLs; Needs assistance with IADLS   Lives With Spouse  (and adult son)   Hannajuanita Soniom Help From Family   IADLs Family/Friend/Other provides meals; Family/Friend/Other provides transportation; Independent with medication management   Falls in the last 6 months 1 to 4  (1; reason for current admission)   Vocational Retired  (respiratory therapist)   Comments At baseline, pt reports she amb ind w/o AD (use of rollator walker prn), performs ADLs ind   General   Family/Caregiver Present No   Cognition   Overall Cognitive Status Impaired  (questionable safety awareness)   Arousal/Participation Alert   Attention Within functional limits   Orientation Level Oriented X4   Memory Decreased recall of precautions   Following Commands Follows one step commands without difficulty   Comments Pt ID via name and ; pt agreeable to PT eval and OOB mobility   Subjective   Subjective "I started wearing these shoes and no more toe pain" Strength RLE   RLE Overall Strength 3+/5  (grossly assessed w/ functional mobility)   Strength LLE   LLE Overall Strength 3+/5  (grossly assessed w/ functional mobility)   Vision-Basic Assessment   Current Vision Wears glasses all the time   Bed Mobility   Supine to Sit 6  Modified independent   Additional items HOB elevated; Increased time required;Verbal cues   Sit to Supine Unable to assess   Additional Comments pt able to maintain sitting sitting balance at EOB w/o LOB, able to bend down to floor to lukas shoes w/ supervision   Transfers   Sit to Stand 5  Supervision   Additional items Assist x 1; Increased time required;Verbal cues   Stand to Sit 5  Supervision   Additional items Assist x 1; Armrests; Increased time required;Verbal cues   Additional Comments VC for locking of rollator and for alignment to chair prior to descent   Ambulation/Elevation   Gait pattern Improper Weight shift;Decreased foot clearance; Excessively slow; Short stride   Gait Assistance 5  Supervision   Additional items Assist x 1;Verbal cues   Assistive Device 4-wheeled walker  (pt's rollator)   Distance 75'   Balance   Static Sitting Good   Dynamic Sitting Fair +   Static Standing Fair   Dynamic Standing Merline Motta [id number] -  (w/ RW)   Activity Tolerance   Activity Tolerance Patient tolerated treatment well   Medical Staff Made Aware spoke to OT Gale   Nurse Made Aware JAYSHREE Burdick Levo   Assessment   Prognosis Good   Problem List Decreased strength; Impaired balance;Decreased mobility; Decreased safety awareness   Assessment Gabe Lundberg is a 67 y o  Female who presents to [address] [place] [address] on 1/3/2023 due to fall and diagnosis of herpes zoster w/o complication  Orders for PT eval and treat received, w/ activity orders of up w/ assist and contact precautions  Comorbidities affecting pt at time of evaluation include: HTN, DM, CKD, fibromyalgia, memory changes, depression, h/o COVID-19, COPD   Personal factors affecting DC include: inability to navigate community distances, positive fall history and inability to perform IADLs  At baseline, pt mobilizes independently w/ rollator walker prn, and w/ 1 fall(s) in the previous 6 months  Upon evaluation, pt presents w/ the following deficits: weakness, impaired skin integrity, impaired balance and gait deviations  Pt currently requires mod I for bed mobility, supervision for transfers, and supervision w/ rollator walker for ambulation  Pt's clinical presentation is unstable/unpredictable due to need for input for mobility technique/safety, recent drastic decline in mobility compared to baseline and recent history of falls  From a PT/mobility standpoint, given the above findings, DC recommendation is: Home w/ HHPT  During current admission, pt will benefit from continued skilled inpatient PT in the acute care setting in order to address the above deficits and to maximize function and mobility prior to DC from acute care  Goals   Patient Goals to go home   STG Expiration Date 01/16/23   Short Term Goal #1 Pt will: perform bed mobility w/ mod I to decrease pt's burden of care and increase pt's independence w/ repositioning in bed; perform transfers w/ mod I to increase pt's OOB mobility; ambulate at least 350' w/ LRAD and mod I to increase pt's ambulatory endurance/tolerance; increase all balance ratings by at least 1 grade to decrease pt's risk of falls   PT Treatment Day 0   Plan   Treatment/Interventions Functional transfer training;LE strengthening/ROM; Therapeutic exercise; Endurance training;Patient/family training;Equipment eval/education; Bed mobility;Gait training   PT Frequency 2-3x/wk   Recommendation   PT Discharge Recommendation Home with home health rehabilitation   Equipment Recommended Walker  (pt has rollator walker)   AM-PAC Basic Mobility Inpatient   Turning in Flat Bed Without Bedrails 4   Lying on Back to Sitting on Edge of Flat Bed Without Bedrails 4   Moving Bed to Chair 3   Standing Up From Chair Using Arms 3 Walk in Room 3   Climb 3-5 Stairs With Railing 2   Basic Mobility Inpatient Raw Score 19   Basic Mobility Standardized Score 42 48   Highest Level Of Mobility   -HLM Goal 6: Walk 10 steps or more   JH-HLM Achieved 7: Walk 25 feet or more   End of Consult   Patient Position at End of Consult Bedside chair;Bed/Chair alarm activated; All needs within reach       The patient's AM-PAC Basic Mobility Inpatient Short Form Raw Score is 19  A Raw score of greater than 16 suggests the patient may benefit from discharge to home  Please also refer to the recommendation of the Physical Therapist for safe discharge planning      Pt will benefit from skilled inpatient PT during this admission in order to facilitate progress towards goals and to maximize functional independence prior to [place]  rec: Олег Vo, PT, DPT  01/06/23

## 2023-01-06 NOTE — ASSESSMENT & PLAN NOTE
Patient presented with headache starting approximately 5 days ago  She came to the emergency department on 1/1/23 and was discharged with Fioricet with some improvement in her headache  She returns today with worsening headache with associated left temporal tenderness  He also has some associated confusion  She was found to have mildly elevated ESR of 56 and out of concern for GCA she was given prednisone in the emergency department  Patient also reports some shoulder and low back pain but may be secondary to osteoarthritis  · She was initially started on high-dose IV steroids with concern for giant cell arteritis  Underwent temporal artery ultrasound and temporal artery biopsy  However subsequently developed rash in the distribution of the trigeminal nerve on the left half of the face  Started on valacyclovir for herpes zoster ophthalmicus  Started on gabapentin and headache has improved

## 2023-01-06 NOTE — CASE MANAGEMENT
Case Management Discharge Planning Note    Patient name Mariza Avila  Location S /S -01 MRN [id number]  : 1950 Date 2023       Current Admission Date: 1/3/2023  Current Admission Diagnosis:Headache   Patient Active Problem List    Diagnosis Date Noted   • Headache 2023   • Hypothyroid 2023   • Urinary incontinence 2022   • S/P foot surgery 2022   • UTI (urinary tract infection) 2022   • Hypotension 2022   • Urinary retention 2022   • Abnormal thyroid blood test 2022   • Abdominal pain 2022   • Diabetic nephropathy associated with type 2 diabetes mellitus ([facility] ) 2022   • Other chest pain 2021   • COVID-19 virus infection 2021   • Chronic constipation 2021   • History of colon polyps 2021   • BMI 29 0-29 9,adult 2021   • Epigastric abdominal pain with severe diabetic gastroparesis, status post EGD/Botox injection, 2021   • COPD (chronic obstructive pulmonary disease) ([facility] ) 2021   • Anemia in stage 4 chronic kidney disease ([facility] ) 2021   • B12 neuropathy ([facility] ) 2021   • Depression, recurrent ([facility] ) 2021   • Chronic respiratory failure with hypoxia ([facility] ) 2021   • Gastroparesis diabeticorum ([facility] ) 2021   • Ambulatory dysfunction 10/22/2020   • Memory changes 10/22/2020   • History of kidney stones 2020   • Urinary urgency 2020   • HTN (hypertension) 2020   • Type 2 diabetes mellitus with diabetic chronic kidney disease ([facility] ) 2020   • Fibromyalgia 2020   • ANDRA (acute kidney injury) ([facility] ) 2020   • Dyslipidemia 2020   • Vitamin D deficiency 2020   • Anemia of chronic renal failure, stage 3 (moderate) ([facility] ) 2020   • History of repair of hiatal hernia 2020   • Acute respiratory failure with hypoxia ([place] [address] ) 2020   • Dyspnea    • Pulmonary hypertension ([place] [address] ) 2019   • Type 2 diabetes mellitus with diabetic nephropathy, with long-term current use of insulin ([facility] ) 06/27/2019   • Persistent proteinuria 06/25/2019   • BARBARA (obstructive sleep apnea) 04/09/2019   • RLS (restless legs syndrome) 04/09/2019   • Stage 4 chronic kidney disease ([facility] ) 09/19/2018   • Hypertensive chronic kidney disease with stage 1 through stage 4 chronic kidney disease, or unspecified chronic kidney disease 09/19/2018      LOS (days): 2  Geometric Mean LOS (GMLOS) (days): 2 80  Days to GMLOS:1     OBJECTIVE:  Risk of Unplanned Readmission Score: 30 23         Current admission status: Inpatient   Preferred Pharmacy:   [facility] [address] [address]  Phone: [phone] Fax: [phone]    Primary Care Provider: Waldo Ya MD    Primary Insurance: MEDICARE  Secondary Insurance: COMMERCIAL MISCELLANEOUS    DISCHARGE DETAILS:    Discharge planning discussed with[de-identified] Patient  Freedom of Choice: Yes  Comments - Freedom of Choice: CM f/u with patient re: [address] options  Pt now declining [address] as she relayed she does not feel she needs it  Safety concerns addressed, pt remains declining [facility]  [address] referrals cancelled in aidin  CM remains available for any further CM d/c needs  Other Referral/Resources/Interventions Provided:  Interventions: [facility]  Referral Comments: Pt now declining [address] - referrals canceled in aidin  Treatment Team Recommendation: Home with [facility]  Discharge Destination Plan[de-identified] Home  Transport at Discharge : Family                          IMM reviewed with patient, patient agrees with discharge determination    IMM Given (Date):: 01/06/23  IMM Given to[de-identified] Patient

## 2023-01-06 NOTE — ASSESSMENT & PLAN NOTE
Lab Results   Component Value Date    HGBA1C 6 7 (H) 11/04/2022       Recent Labs     01/05/23  2133 01/05/23  2323 01/06/23  0722 01/06/23  1119   POCGLU 419* 278* 122 131       Blood Sugar Average: Last 72 hrs:  (P) 526 [id number]'s home diabetes regimen is NovoLog with meals  16 units with breakfast, 16 units with lunch, and 25 units with dinner  Levemir 50 units at bedtime  Initially had hypoglycemia on admission however subsequently has hyperglycemia on IV steroids  Plan:  · Continue home regimen of Humalog 16 units with breakfast, 16 units with lunch, 25 units with dinner  · Continue with detemir 50 units at bedtime  Sliding scale insulin  · Carb controlled diet  · Patient had a blood sugar of 65 prior to lunch  Will give 10 units of Humalog instead of her scheduled 16 units after lunch    · Resume outpatient insulin regimen upon discharge

## 2023-01-06 NOTE — ASSESSMENT & PLAN NOTE
Blood Pressure: 111/54    Patient with hypertension, continue with home regimen  Torsemide 20 mg, metoprolol 50 mg twice daily, amlodipine 5 mg, 20 mg olmesartan a m  and 10 mg p m  (formulary replacement with losartan)    Plan:  · Continue torsemide  · Continue metoprolol  · Continue amlodipine  · Continue irbesartan

## 2023-01-06 NOTE — PROGRESS NOTES
Progress Note - Neurology   Jenny Villeda 67 y o  female [id number]  Unit/Bed#: S /S -01    Assessment:    * Headache  Assessment & Plan  68 y/o female with arthritis, CKD, COPD, depression, DM, neuropathy, hypothyroidism, GERD, HTN, HLD, RLS, who presents with left sided headache x 5 days and new onset confusion on 1/3/23  There was concern for possible GCA, therefore, patient was started on steroids and underwent temporal artery biopsy  Over night 1/5-1/6, she developed a rash on the left face/eye that is concerning for herpes zoster  Plan:  - S/p Solumedrol; dc'd 1/6  - S/p temporal artery biopsy; results pending  - Started on valcyclovir 1000 mg daily (adjusted for renal function) per primary team  - S/p Tylenol 650 mg in ED x 1  - S/p Prednisone 60 mg in ED x 1  - Recommend Ophthalmology evaluation  - Monitor neuro exam; notify with any changes  - Medical management and supportive care per primary team  Correction of any metabolic or infectious disturbances  Results:  - CT head: No acute intracranial abnormality  Mild microangiopathic changes are similar to the prior study  - Temporal artery biopsy:   · RIGHT SIDE:  There is no evidence of giant cell arteritis, aneurysm, or significant stenosis noted in the superficial temporal artery and its branches  · LEFT SIDE:  There is no evidence of giant cell arteritis, aneurysm, or significant stenosis noted in the superficial temporal artery and its branches    - 1/1 Labs: ESR 31, CRP 1 4  - 1/3 Labs: WBC 12 06, UA concerning for UTI, AST 64, , ESR 56, COVID/flu/RSV negative    UTI (urinary tract infection)  Assessment & Plan  - UA concerning for UTI; urine culture pending  - Blood cultures pending  - S/p ceftriaxone  - Currently on cefepime  - Currently afebrile   - Medical management per primary team    Type 2 diabetes mellitus with diabetic chronic kidney disease [facility])  Assessment & Plan  Lab Results   Component Value Date    HGBA1C 6 7 (H) 11/04/2022       Recent Labs     01/05/23  2040 01/05/23  2133 01/05/23  2323 01/06/23  0722   POCGLU 408* 419* 278* 122       Blood Sugar Average: Last 72 hrs:  (P) 262       - Medical management per primary team       Jonah Chaney will need follow up in in 2 weeks with general attending/AP  She will not require outpatient neurological testing  Case and treatment plan reviewed with attending neurologist, Dr Florence Franco  Please see attending attestation for any further recommendations  Subjective:   Patient sitting on the side of the bed  She states she developed this rash on the left side of her face/eye and feels that this is shingles related  She also endorses some left eye blurry vision as well          Past Medical History:   Diagnosis Date   • Allergic    • Allergic rhinitis    • Anesthesia     pt reports "had to use double lumen endo tube for hiatal hernia repair/so surgeon could get to where he needed to work"   • Arthritis    • Aspiration into airway    • Basal cell carcinoma 2007    left cheek    • BCC (basal cell carcinoma) 05/27/2021    Left Nasal tip   • Cancer (HCC)     squamous cell cancer on forhead   • Cancer ([address] )     basal cell on nose    • Chronic kidney disease 2000, 2018    Stones, kidney disease stage 4   • Chronic pain disorder     bilat feet and joint pain on occas   • Colon polyp    • Constipation    • COPD (chronic obstructive pulmonary disease) ([address] )    • COVID-19 08/2021    recovered at home/did receive monoclonal infusion   • Dental bridge present    • Depression    • Diabetes mellitus ([address] )     Type 2   • Diabetic neuropathy ([address] )    • Disease of thyroid gland    • Family history of thyroid problem    • Fatty liver    • GERD (gastroesophageal reflux disease)    • Heart burn    • Hiatal hernia    • History of pneumonia    • Hyperlipidemia    • Hyperplasia, parathyroid ([address] )    • Hypertension    • Kidney problem    • Kidney stone    • Memory loss Julu2 2020   • Motion sickness    • Motion sickness    • Neck pain    • Obesity 1978   • Obesity (BMI 30 0-34  9)    • Pollen allergies    • RLS (restless legs syndrome)    • SCC (squamous cell carcinoma) 05/04/2021    left mid forehead   • Seasonal allergies    • Sleep apnea     has inspire   • Squamous cell skin cancer 2007    left cheek    • Swollen ankles    • 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, left     great toe fusion   • Urinary incontinence    • Urinary tract infection 03/28/2022   • Wears glasses      Past Surgical History:   Procedure Laterality Date   • ARTHROSCOPY KNEE     • BREAST BIOPSY      stereotactic-benign   • BREAST BIOPSY      stereo-benign   • BREAST EXCISIONAL BIOPSY      unknown date-benign   • BREAST EXCISIONAL BIOPSY      unknown date-benign   • BREAST EXCISIONAL BIOPSY      unknown date-benign   • BREAST EXCISIONAL BIOPSY      unknown age-benign   • CARDIAC CATHETERIZATION     • CHOLECYSTECTOMY     • COLONOSCOPY     • EXAMINATION UNDER ANESTHESIA N/A 06/24/2021    Procedure: EXAM UNDER ANESTHESIA (EUA), DISE;  Surgeon: Joann Stephens MD;  [facility];  Service: ENT   • HERNIA REPAIR     • HIATAL HERNIA REPAIR     • KNEE SURGERY      Torn maniscus lap surg   • LIPOSUCTION     • LYMPH NODE BIOPSY     • MOHS SURGERY  05/20/2021    left mid forehead-Gautam   • MOHS SURGERY Left 05/27/2021    Left nasal tip- gautam    • NJ LIGATION/BIOPSY TEMPORAL ARTERY Left 1/5/2023    Procedure: BIOPSY ARTERY TEMPORAL;  Surgeon: Serene Ambriz DO;  [facility];  Service: Vascular   • NJ OPEN IMPLANTATION CRANIAL NERVE VASQUEZ & PULSE GEN N/A 11/10/2021    Procedure: INSERTION UPPER AIRWAY STIMULATOR, INSPIRE IMPLANT;  Surgeon: Joann Stephens MD;  Location[facility];  Service: ENT   • NJ UNLISTED PROCEDURE FOOT/TOES Right 07/19/2022    Procedure: 1st metatarsal phalangeal joint fusion;  Surgeon: Matilde Amanda DPM;  Location[facility];  Service: Podiatry   • REDUCTION MAMMAPLASTY Bilateral 2000   • REDUCTION MAMMAPLASTY     • SKIN BIOPSY     • SKIN CANCER EXCISION  2007    squamous cell carcinoma    • SKIN CANCER EXCISION  2007    basal cell carcinoma   • SQUAMOUS CELL CARCINOMA EXCISION     • TOE SURGERY Right 2022    Right Great Toe Fusion   • TONSILLECTOMY     • TUBAL LIGATION     • UPPER GASTROINTESTINAL ENDOSCOPY     • US GUIDED BREAST BIOPSY RIGHT COMPLETE Right 2022     Family History   Problem Relation Age of Onset   • Heart disease Mother    • Depression Mother    • Hypertension Mother    • COPD Mother    • Hearing loss Mother    • Anxiety disorder Mother    • Heart disease Father    • Lung cancer Father 79        Smoker    • Cancer Father         brain   • Alcohol abuse Father    • Dementia Father    • Hypertension Father    • Thyroid disease Father    • COPD Father    • Arthritis Father    • Brain cancer Father 76   • Hypertension Sister    • Diabetes Sister    • Heart disease Sister    • Thyroid disease Sister    • Cancer Sister         Lympoma, lung   • Lung cancer Sister 78   • Anxiety disorder Sister    • Hypertension Brother    • Diabetes Brother    • Cancer Brother         Throat   • Dementia Brother    • Stroke Brother    • Hypertension Brother    • Heart disease Brother    • Diabetes Brother    • No Known Problems Son    • No Known Problems Son    • Brain cancer Paternal Aunt         unknown age   • Breast cancer Neg Hx      Social History     Socioeconomic History   • Marital status: /Civil Union     Spouse name: None   • Number of children: None   • Years of education: None   • Highest education level: None   Occupational History   • Occupation: RETIRED   Tobacco Use   • Smoking status: Former     Packs/day: 2 00     Years: 10 00     Pack years: 20 00     Types: Cigarettes     Start date: 1968     Quit date: 1976     Years since quittin 0   • Smokeless tobacco: Never   • Tobacco comments:     Smoked 2 pack a day   Vaping Use   • Vaping Use: Never used   Substance and Sexual Activity   • Alcohol use: Yes     Comment: 1 or 2 a year   • Drug use: No   • Sexual activity: Not Currently     Partners: Male     Birth control/protection: Abstinence, Post-menopausal, Female Sterilization     Comment: defer   Other Topics Concern   • None   Social History Narrative   • None     Social Determinants of Health     Financial Resource Strain: Not on file   Food Insecurity: No Food Insecurity   • Worried About Running Out of Food in the Last Year: Never true   • Ran Out of Food in the Last Year: Never true   Transportation Needs: No Transportation Needs   • Lack of Transportation (Medical): No   • Lack of Transportation (Non-Medical): No   Physical Activity: Not on file   Stress: Not on file   Social Connections: Not on file   Intimate Partner Violence: Not on file   Housing Stability: Unknown   • Unable to Pay for Housing in the Last Year: No   • Number of Places Lived in the Last Year: Not on file   • Unstable Housing in the Last Year: No         Medications:   All current active meds have been reviewed and current meds:  Scheduled Meds:  Current Facility-Administered Medications   Medication Dose Route Frequency Provider Last Rate   • acetaminophen  650 mg Oral Q6H PRN Gale Alfaro MD     • albuterol  2 puff Inhalation Q6H PRN Gale Alfaro MD     • amLODIPine  5 mg Oral Daily Gale Alfaro MD     • atorvastatin  40 mg Oral Daily With Dinner Gale Alfaro MD     • calcium carbonate  1 tablet Oral Daily With Breakfast Gale Alfaro MD     • calcium carbonate  1,000 mg Oral Q4H PRN Gale Alfaro MD     • cefepime  1,000 mg Intravenous Q12H Gale Alfaro MD 1,000 mg (01/06/23 1051)   • cholecalciferol  400 Units Oral Daily Gale Alfaro MD     • DULoxetine  60 mg Oral Daily Gale Alfaro MD     • enoxaparin  30 mg Subcutaneous Daily Gale Alfaro MD     • estradiol  1 g Vaginal Once per day on Mon Wed Fri Gale Alfaro MD     • famotidine 20 mg Oral HS Mackenzie Hendricks MD     • gabapentin  300 mg Oral HS Mackenzie Hendricks MD     • insulin detemir  50 Units Subcutaneous QPM Sarah Botello MD     • insulin lispro  1-5 Units Subcutaneous TID AC Mackenzie Hendricks MD     • insulin lispro  16 Units Subcutaneous Daily With Breakfast Mackenzie Hendricks MD     • insulin lispro  16 Units Subcutaneous Daily With Lunch Mackenzie Hendricks MD     • insulin lispro  25 Units Subcutaneous Daily With Dinner Mackenzie Hendricks MD     • lactated ringers  100 mL/hr Intravenous Continuous Mackenzie Hendricks  mL/hr (01/06/23 0910)   • levothyroxine  112 mcg Oral Early Morning Mackenzie Hendricks MD     • losartan  25 mg Oral Daily Mackenzie Hendricks MD     • losartan  50 mg Oral Daily Mackenzie Hendricks MD     • metoprolol tartrate  50 mg Oral Q12H [facility] Mackenzie Hendricks MD     • multivitamin stress formula  1 tablet Oral Daily Mackenzie Hendricks MD     • pantoprazole  40 mg Oral BID Mackenzie Hendricks MD     • pramipexole  0 25 mg Oral HS Mackenzie Hendricks MD     • saccharomyces boulardii  250 mg Oral BID Mackenzie Hendricks MD     • torsemide  20 mg Oral Daily Mackenzie Hendricks MD     • valACYclovir  1,000 mg Oral Q24H Susie Abbott MD       Continuous Infusions:lactated ringers, 100 mL/hr, Last Rate: 100 mL/hr (01/06/23 0910)      PRN Meds: •  acetaminophen  •  albuterol  •  calcium carbonate       ROS:   Review of Systems   Constitutional: Negative for fever  HENT: Negative for trouble swallowing  Eyes: Positive for visual disturbance  Respiratory: Negative for shortness of breath  Cardiovascular: Negative for chest pain  Gastrointestinal: Negative for abdominal pain  Musculoskeletal: Negative for gait problem  Skin: Positive for rash  Neurological: Positive for headaches  Negative for dizziness, tremors, seizures, syncope, facial asymmetry, speech difficulty, weakness, light-headedness and numbness     Psychiatric/Behavioral: Negative for confusion  All other systems reviewed and are negative  Vitals:   /54   Pulse 59   Temp 98 3 °F (36 8 °C)   Resp 18   Ht 5' 2" (1 575 m)   Wt 75 kg (165 lb 5 5 oz)   SpO2 91%   BMI 30 24 kg/m²       Physical Exam:   Physical Exam  Vitals and nursing note reviewed  Constitutional:       General: She is not in acute distress  Appearance: Normal appearance  She is not ill-appearing  HENT:      Head: Normocephalic  Mouth/Throat:      Mouth: Mucous membranes are moist       Pharynx: Oropharynx is clear  Eyes:      General: No scleral icterus  Right eye: No discharge  Left eye: No discharge  Extraocular Movements: Extraocular movements intact and EOM normal       Conjunctiva/sclera: Conjunctivae normal    Cardiovascular:      Rate and Rhythm: Normal rate  Pulmonary:      Effort: Pulmonary effort is normal  No respiratory distress  Musculoskeletal:         General: Normal range of motion  Cervical back: Normal range of motion  Skin:     General: Skin is warm and dry  Coloration: Skin is not jaundiced or pale  Findings: Rash (Left face/eye) present  Neurological:      Mental Status: She is alert and oriented to person, place, and time  Coordination: Finger-Nose-Finger Test normal    Psychiatric:         Mood and Affect: Mood normal          Behavior: Behavior normal        Neurologic Exam     Mental Status   Oriented to person, place, and time  Level of consciousness: alert  Able to follow commands appropriately  No dysarthria noted  Cranial Nerves     CN II   Right visual field deficit: none  Left visual field deficit: none     CN III, IV, VI   Extraocular motions are normal      CN V   Facial sensation intact  CN VII   Facial expression full, symmetric       CN VIII   Hearing: intact    CN IX, X   Palate: symmetric    CN XI   CN XI normal      CN XII   CN XII normal      Motor Exam   Muscle bulk: normal  Bilateral UE strength 5/5 deltoids, biceps, triceps, hand   Bilateral LE strength 5/5 hip flexion, knee flexion, knee extension, dorsiflexion     Sensory Exam   Light touch normal      Gait, Coordination, and Reflexes     Coordination   Finger to nose coordination: normal    Tremor   Resting tremor: absent  Intention tremor: absent      Labs: I have personally reviewed pertinent reports  Recent Results (from the past 24 hour(s))   Fingerstick Glucose (POCT)    Collection Time: 01/05/23  4:30 PM   Result Value Ref Range    POC Glucose 194 (H) 65 - 140 mg/dl   Fingerstick Glucose (POCT)    Collection Time: 01/05/23  8:40 PM   Result Value Ref Range    POC Glucose 408 (H) 65 - 140 mg/dl   Fingerstick Glucose (POCT)    Collection Time: 01/05/23  9:33 PM   Result Value Ref Range    POC Glucose 419 (H) 65 - 140 mg/dl   Fingerstick Glucose (POCT)    Collection Time: 01/05/23 11:23 PM   Result Value Ref Range    POC Glucose 278 (H) 65 - 140 mg/dl   Fingerstick Glucose (POCT)    Collection Time: 01/06/23  7:22 AM   Result Value Ref Range    POC Glucose 122 65 - 140 mg/dl   Fingerstick Glucose (POCT)    Collection Time: 01/06/23 11:19 AM   Result Value Ref Range    POC Glucose 131 65 - 140 mg/dl       Imaging: I have personally reviewed pertinent imaging in PACS, and I have personally reviewed PACS reports  EKG, Pathology, and Other Studies: I have personally reviewed pertinent reports  VTE Prophylaxis: Sequential compression device (Venodyne)  and Enoxaparin (Lovenox)        Total time spent today 22 minutes  Greater than 50% of total time was spent with the patient and / or family counseling and / or coordination of care  A description of the counseling / coordination of care: Patient seen and evaluated  Case reviewed with attending  Chart thoroughly reviewed including labs and medications  Coordination of care with RN

## 2023-01-06 NOTE — ASSESSMENT & PLAN NOTE
Lab Results   Component Value Date    [id number] 1 884 01/04/2023    FREET4 1 16 03/31/2022     Continue levothyroxine 112

## 2023-01-06 NOTE — QUICK NOTE
Patient was concerned about a spreading rash on the left side of her forehead down to her left eye lid  Went to the bedside and examined the patient  She reported that she had a tingling sensation as well as burning sensation over the rash  Upon physical examination, vesicles were noted over the rash, distributed in the trigeminal nerve distribution  She also stated the rash started about 3 days ago  She denied blurry vision, foreign body sensation or vision loss at the time  There was no apparent vesicles noted on the cornea while shining light  Started valacyclovir 1000 mg Q8H and dose was adjusted by pharmacy due to renal function, 1000 mg Q24H

## 2023-01-07 VITALS
BODY MASS INDEX: 30.43 KG/M2 | OXYGEN SATURATION: 94 % | HEART RATE: 55 BPM | SYSTOLIC BLOOD PRESSURE: 151 MMHG | HEIGHT: 62 IN | DIASTOLIC BLOOD PRESSURE: 81 MMHG | WEIGHT: 165.34 LBS | TEMPERATURE: 98.1 F | RESPIRATION RATE: 20 BRPM

## 2023-01-07 PROBLEM — G93.41 ACUTE METABOLIC ENCEPHALOPATHY: Status: ACTIVE | Noted: 2023-01-07

## 2023-01-07 LAB
ANION GAP SERPL CALCULATED.3IONS-SCNC: 8 MMOL/L (ref 4–13)
BASOPHILS # BLD AUTO: 0.01 THOUSANDS/ÂΜL (ref 0–0.1)
BASOPHILS NFR BLD AUTO: 0 % (ref 0–1)
BUN SERPL-MCNC: 45 MG/DL (ref 5–25)
CALCIUM SERPL-MCNC: 7.4 MG/DL (ref 8.4–10.2)
CHLORIDE SERPL-SCNC: 102 MMOL/L (ref 96–108)
CO2 SERPL-SCNC: 26 MMOL/L (ref 21–32)
CREAT SERPL-MCNC: 1.82 MG/DL (ref 0.6–1.3)
EOSINOPHIL # BLD AUTO: 0.03 THOUSAND/ÂΜL (ref 0–0.61)
EOSINOPHIL NFR BLD AUTO: 0 % (ref 0–6)
ERYTHROCYTE [DISTWIDTH] IN BLOOD BY AUTOMATED COUNT: 15.4 % (ref 11.6–15.1)
GFR SERPL CREATININE-BSD FRML MDRD: 27 ML/MIN/1.73SQ M
GLUCOSE SERPL-MCNC: 124 MG/DL (ref 65–140)
GLUCOSE SERPL-MCNC: 144 MG/DL (ref 65–140)
GLUCOSE SERPL-MCNC: 163 MG/DL (ref 65–140)
GLUCOSE SERPL-MCNC: 67 MG/DL (ref 65–140)
GLUCOSE SERPL-MCNC: 85 MG/DL (ref 65–140)
HCT VFR BLD AUTO: 36.9 % (ref 34.8–46.1)
HGB BLD-MCNC: 12.3 G/DL (ref 11.5–15.4)
IMM GRANULOCYTES # BLD AUTO: 0.04 THOUSAND/UL (ref 0–0.2)
IMM GRANULOCYTES NFR BLD AUTO: 0 % (ref 0–2)
LYMPHOCYTES # BLD AUTO: 1.51 THOUSANDS/ÂΜL (ref 0.6–4.47)
LYMPHOCYTES NFR BLD AUTO: 17 % (ref 14–44)
MCH RBC QN AUTO: 29.4 PG (ref 26.8–34.3)
MCHC RBC AUTO-ENTMCNC: 33.3 G/DL (ref 31.4–37.4)
MCV RBC AUTO: 88 FL (ref 82–98)
MONOCYTES # BLD AUTO: 0.63 THOUSAND/ÂΜL (ref 0.17–1.22)
MONOCYTES NFR BLD AUTO: 7 % (ref 4–12)
NEUTROPHILS # BLD AUTO: 6.89 THOUSANDS/ÂΜL (ref 1.85–7.62)
NEUTS SEG NFR BLD AUTO: 76 % (ref 43–75)
NRBC BLD AUTO-RTO: 0 /100 WBCS
PLATELET # BLD AUTO: 196 THOUSANDS/UL (ref 149–390)
PMV BLD AUTO: 11.2 FL (ref 8.9–12.7)
POTASSIUM SERPL-SCNC: 3.1 MMOL/L (ref 3.5–5.3)
RBC # BLD AUTO: 4.19 MILLION/UL (ref 3.81–5.12)
SODIUM SERPL-SCNC: 136 MMOL/L (ref 135–147)
WBC # BLD AUTO: 9.11 THOUSAND/UL (ref 4.31–10.16)

## 2023-01-07 RX ORDER — GABAPENTIN 300 MG/1
300 CAPSULE ORAL 3 TIMES DAILY
Qty: 30 CAPSULE | Refills: 0 | Status: SHIPPED | OUTPATIENT
Start: 2023-01-07 | End: 2023-01-12 | Stop reason: SDUPTHER

## 2023-01-07 RX ORDER — INSULIN LISPRO 100 [IU]/ML
10 INJECTION, SOLUTION INTRAVENOUS; SUBCUTANEOUS
Status: COMPLETED | OUTPATIENT
Start: 2023-01-07 | End: 2023-01-07

## 2023-01-07 RX ORDER — VALACYCLOVIR HYDROCHLORIDE 1 G/1
1000 TABLET, FILM COATED ORAL EVERY 24 HOURS
Qty: 8 TABLET | Refills: 0 | Status: SHIPPED | OUTPATIENT
Start: 2023-01-08 | End: 2023-01-16

## 2023-01-07 RX ORDER — PREDNISOLONE ACETATE 10 MG/ML
1 SUSPENSION/ DROPS OPHTHALMIC 3 TIMES DAILY
Qty: 15 ML | Refills: 0 | Status: SHIPPED | OUTPATIENT
Start: 2023-01-07 | End: 2023-01-12

## 2023-01-07 RX ORDER — POTASSIUM CHLORIDE 20 MEQ/1
40 TABLET, EXTENDED RELEASE ORAL ONCE
Status: COMPLETED | OUTPATIENT
Start: 2023-01-07 | End: 2023-01-07

## 2023-01-07 RX ADMIN — ENOXAPARIN SODIUM 30 MG: 30 INJECTION SUBCUTANEOUS at 09:12

## 2023-01-07 RX ADMIN — PANTOPRAZOLE SODIUM 40 MG: 40 TABLET, DELAYED RELEASE ORAL at 05:39

## 2023-01-07 RX ADMIN — METOPROLOL TARTRATE 50 MG: 50 TABLET, FILM COATED ORAL at 09:11

## 2023-01-07 RX ADMIN — CALCIUM 1 TABLET: 500 TABLET ORAL at 09:11

## 2023-01-07 RX ADMIN — LEVOTHYROXINE SODIUM 112 MCG: 112 TABLET ORAL at 05:38

## 2023-01-07 RX ADMIN — AMLODIPINE BESYLATE 5 MG: 5 TABLET ORAL at 09:11

## 2023-01-07 RX ADMIN — INSULIN LISPRO 10 UNITS: 100 INJECTION, SOLUTION INTRAVENOUS; SUBCUTANEOUS at 13:19

## 2023-01-07 RX ADMIN — TORSEMIDE 20 MG: 20 TABLET ORAL at 09:09

## 2023-01-07 RX ADMIN — GABAPENTIN 300 MG: 300 CAPSULE ORAL at 09:11

## 2023-01-07 RX ADMIN — CHOLECALCIFEROL TAB 10 MCG (400 UNIT) 400 UNITS: 10 TAB at 09:12

## 2023-01-07 RX ADMIN — LOSARTAN POTASSIUM 25 MG: 25 TABLET, FILM COATED ORAL at 09:10

## 2023-01-07 RX ADMIN — DOCUSATE SODIUM 100 MG: 100 CAPSULE, LIQUID FILLED ORAL at 09:11

## 2023-01-07 RX ADMIN — LOSARTAN POTASSIUM 50 MG: 50 TABLET, FILM COATED ORAL at 09:10

## 2023-01-07 RX ADMIN — POTASSIUM CHLORIDE 40 MEQ: 1500 TABLET, EXTENDED RELEASE ORAL at 13:19

## 2023-01-07 RX ADMIN — PREDNISOLONE ACETATE 1 DROP: 10 SUSPENSION/ DROPS OPHTHALMIC at 09:15

## 2023-01-07 RX ADMIN — POLYETHYLENE GLYCOL 3350 17 G: 17 POWDER, FOR SOLUTION ORAL at 09:13

## 2023-01-07 RX ADMIN — INSULIN LISPRO 16 UNITS: 100 INJECTION, SOLUTION INTRAVENOUS; SUBCUTANEOUS at 09:16

## 2023-01-07 RX ADMIN — Medication 250 MG: at 09:12

## 2023-01-07 RX ADMIN — DULOXETINE HYDROCHLORIDE 60 MG: 60 CAPSULE, DELAYED RELEASE ORAL at 09:11

## 2023-01-07 NOTE — PROGRESS NOTES
NEUROLOGY RESIDENCY PROGRESS NOTE     Name: Gabe Lundberg   Age & Sex: 67 y o  female   MRN: [id number]  Unit/Bed#: S -01   Encounter: [id number]    ASSESSMENT & PLAN     Headache  Assessment & Plan  66 y/o female with arthritis, CKD, COPD, depression, DM, neuropathy, hypothyroidism, GERD, HTN, HLD, RLS, who presents with left sided headache x 5 days and new onset confusion on 1/3/23  There was concern for possible GCA, therefore, patient was started on steroids and underwent temporal artery biopsy  Over night 1/5-1/6, she developed a rash on the left face/eye that is concerning for herpes zoster  Plan:  - S/p Solumedrol; dc'd 1/6  - S/p temporal artery biopsy  - Started on valcyclovir 1000 mg daily (adjusted for renal function) per primary team  - S/p Tylenol 650 mg in ED x 1  - S/p Prednisone 60 mg in ED x 1  - S/P ophthalmology examination who 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 and recommended that the patient be started on Pred forte eye drops 3 times a day for 5 days and to then follow up with them allergy as an outpatient  - Monitor neuro exam; notify with any changes  - Medical management and supportive care per primary team  Correction of any metabolic or infectious disturbances  Results:  - CT head: No acute intracranial abnormality  Mild microangiopathic changes are similar to the prior study  - Temporal artery biopsy:   · RIGHT SIDE:  There is no evidence of giant cell arteritis, aneurysm, or significant stenosis noted in the superficial temporal artery and its branches  · LEFT SIDE:  There is no evidence of giant cell arteritis, aneurysm, or significant stenosis noted in the superficial temporal artery and its branches    - 1/1 Labs: ESR 31, CRP 1 4  - 1/3 Labs: WBC 12 06, UA concerning for UTI, AST 64, , ESR 56, COVID/flu/RSV negative    UTI (urinary tract infection)  Assessment & Plan  - UA concerning for UTI; urine culture positive for E  coli  - Blood cultures NGTD  - S/p ceftriaxone initially and subsequently cefepime  - Medical management per primary team    Type 2 diabetes mellitus with diabetic chronic kidney disease [facility])  Assessment & Plan  Lab Results   Component Value Date    HGBA1C 6 7 (H) 2022       Recent Labs     23  1119 23  1553 23  2123 23  0756   POCGLU 131 131 115 85       Blood Sugar Average: Last 72 hrs:  (P) 228 5625       - Medical management per primary team    * Herpes zoster without complication  Assessment & Plan  - S/P ophthalmology examination who 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 and recommended that the patient be started on Pred forte eye drops 3 times a day for 5 days and to then follow up with them allergy as an outpatient  Kwesi Wiggins will need follow up in in 2 weeks with general AP or Attending  She will not require outpatient neurological testing  Disposition pending: No additional recommendations  SUBJECTIVE     Patient was seen and examined  No acute events overnight  Denies lightheadedness, dizziness, syncope, headache, vision changes, diaphoresis, chest pain, palpitations, SOB, nausea, vomiting, abdominal pain or lower extremity edema  Review of Systems  A 12 point ROS was completed  Other than the above mentioned complaints, all remaining systems were negative  OBJECTIVE     Patient ID: Kwesi Wiggins is a 67 y o  female  Vitals:    23 1517 23 2105 23 2201 23 0754   BP: 142/78 142/75 155/81 141/82   Pulse: 59 60 62 59   Resp: 18   18   Temp: (!) 97 3 °F (36 3 °C)  98 9 °F (37 2 °C) 98 1 °F (36 7 °C)   TempSrc:       SpO2: 97% 95% 95% 94%   Weight:       Height:          Temperature:   Temp (24hrs), Av 1 °F (36 7 °C), Min:97 3 °F (36 3 °C), Max:98 9 °F (37 2 °C)    Temperature: 98 1 °F (36 7 °C)    Physical Exam:  Vitals and nursing note reviewed  Constitutional: Alert  Not in acute distress   Not ill-appearing, toxic-appearing or diaphoretic  HENT: Normocephalic and atraumatic  Nose and Ears normal     Eyes: No scleral icterus  No discharge  Neck: Neck Supple  ROM normal    Cardiovascular: Distal extremities warm without palpable edema or tenderness, no observed significant swelling  Pulmonary:  Pulmonary effort is normal  Not in respiratory distress   Abdominal: Abdomen is flat and not distended   Musculoskeletal: No swelling or deformity  Skin: Warm and dry, V1 Rash - improving   Psychiatric:  Normal behavior and appropriate affect      Neurological Exam  Mental Status  Awake, alert and oriented to person, place and time  Recent and remote memory are intact  Speech is normal  Language is fluent with no aphasia  Attention and concentration are normal     Cranial Nerves  CN III, IV, VI: Extraocular movements intact bilaterally  Normal lids and orbits bilaterally  CN VII: Full and symmetric facial movement  CN VIII: Hearing is normal     Motor  Normal muscle bulk throughout  No fasciculations present  Normal muscle tone  No abnormal involuntary movements  Spontaneously moving all extremities  LABORATORY DATA     Labs: Additional Pertinent Lab Tests Reviewed:  All Labs Within Last 24 Hours Reviewed  Results from last 7 days   Lab Units 01/06/23  1211 01/04/23  0739 01/03/23  0443   WBC Thousand/uL 19 44* 13 74* 12 06*   HEMOGLOBIN g/dL 12 4 12 5 14 9   HEMATOCRIT % 37 4 38 0 44 7   PLATELETS Thousands/uL 243 231 274   NEUTROS PCT % 87* 81* 74   MONOS PCT % 5 7 7      Results from last 7 days   Lab Units 01/06/23  1211 01/04/23  0739 01/03/23  0443   POTASSIUM mmol/L 3 4* 3 7 4 0   CHLORIDE mmol/L 101 99 100   CO2 mmol/L 26 28 29   BUN mg/dL 58* 58* 40*   CREATININE mg/dL 2 12* 1 88* 1 50*   CALCIUM mg/dL 7 4* 9 1 10 6*   ALK PHOS U/L  --   --  75   ALT U/L  --   --  140*   AST U/L  --   --  64*                  Results from last 7 days   Lab Units 01/03/23  0805   LACTIC ACID mmol/L 0 7           IMAGING & DIAGNOSTIC TESTING     Radiology Results: I have personally reviewed pertinent films in PACS    VAS temporal artery duplex   Final Result by Thuy Lutz MD (01/05 1047)      CT facial bones without contrast   Final Result by Ron Rodas DO (01/03 1039)      Normal noncontrast CT of the facial bones  Workstation performed: [id number]         CT head without contrast   Final Result by Brayan García MD (01/03 1967)      No acute intracranial abnormality  Mild microangiopathic changes are similar to the prior study  Workstation performed: [id number]         CT spine cervical without contrast   Final Result by Brayan García MD (01/03 9019)      No acute cervical spine fracture or traumatic malalignment  Workstation performed: [id number]             Other Diagnostic Testing: I have personally reviewed pertinent reports        ACTIVE MEDICATIONS     Current Facility-Administered Medications   Medication Dose Route Frequency   • acetaminophen (TYLENOL) tablet 650 mg  650 mg Oral Q6H PRN   • albuterol (PROVENTIL HFA,VENTOLIN HFA) inhaler 2 puff  2 puff Inhalation Q6H PRN   • amLODIPine (NORVASC) tablet 5 mg  5 mg Oral Daily   • atorvastatin (LIPITOR) tablet 40 mg  40 mg Oral Daily With Dinner   • calcium carbonate (OYSTER SHELL,OSCAL) 500 mg tablet 1 tablet  1 tablet Oral Daily With Breakfast   • calcium carbonate (TUMS) chewable tablet 1,000 mg  1,000 mg Oral Q4H PRN   • cholecalciferol (VITAMIN D3) tablet 400 Units  400 Units Oral Daily   • docusate sodium (COLACE) capsule 100 mg  100 mg Oral BID   • DULoxetine (CYMBALTA) delayed release capsule 60 mg  60 mg Oral Daily   • enoxaparin (LOVENOX) subcutaneous injection 30 mg  30 mg Subcutaneous Daily   • estradiol (ESTRACE) vaginal cream 1 g  1 g Vaginal Once per day on Mon Wed Fri   • famotidine (PEPCID) tablet 20 mg  20 mg Oral HS   • gabapentin (NEURONTIN) capsule 300 mg  300 mg Oral TID   • insulin detemir (LEVEMIR) subcutaneous injection 50 Units  50 Units Subcutaneous QPM   • insulin lispro (HumaLOG) 100 units/mL subcutaneous injection 1-5 Units  1-5 Units Subcutaneous TID AC   • insulin lispro (HumaLOG) 100 units/mL subcutaneous injection 16 Units  16 Units Subcutaneous Daily With Breakfast   • insulin lispro (HumaLOG) 100 units/mL subcutaneous injection 16 Units  16 Units Subcutaneous Daily With Lunch   • insulin lispro (HumaLOG) 100 units/mL subcutaneous injection 25 Units  25 Units Subcutaneous Daily With Dinner   • lactated ringers infusion  100 mL/hr Intravenous Continuous   • levothyroxine tablet 112 mcg  112 mcg Oral Early Morning   • losartan (COZAAR) tablet 25 mg  25 mg Oral Daily   • losartan (COZAAR) tablet 50 mg  50 mg Oral Daily   • metoprolol tartrate (LOPRESSOR) tablet 50 mg  50 mg Oral Q12H RICHARD   • multivitamin stress formula tablet 1 tablet  1 tablet Oral Daily   • pantoprazole (PROTONIX) EC tablet 40 mg  40 mg Oral BID   • polyethylene glycol (MIRALAX) packet 17 g  17 g Oral Daily   • pramipexole (MIRAPEX) tablet 0 25 mg  0 25 mg Oral HS   • prednisoLONE acetate (PRED FORTE) 1 % ophthalmic suspension 1 drop  1 drop Left Eye TID   • saccharomyces boulardii (FLORASTOR) capsule 250 mg  250 mg Oral BID   • torsemide (DEMADEX) tablet 20 mg  20 mg Oral Daily   • valACYclovir (VALTREX) tablet 1,000 mg  1,000 mg Oral Q24H       Prior to Admission medications    Medication Sig Start Date End Date Taking?  Authorizing Provider   acetaminophen (TYLENOL) 325 mg tablet Take 2 tablets (650 mg total) by mouth every 6 (six) hours as needed for mild pain, headaches or fever 4/14/22  Yes Rufina Peace PA-C   albuterol (Ventolin HFA) 90 mcg/act inhaler Inhale 2 puffs every 6 (six) hours as needed for wheezing 8/4/22  Yes Rachel Abad DO   amLODIPine (NORVASC) 5 mg tablet Take 1 tablet (5 mg total) by mouth daily 11/28/22  Yes Erik Mcduffie MD   b complex vitamins capsule Take 1 capsule by mouth daily   Yes Historical Provider, MD CARREON ULTRAFINE III SHORT PEN 31G X 8 MM MISC USE AS DIRECTED 4 TIMES PER DAY 7/26/19  Yes Historical Provider, MD   Biotin w/ Vitamins C & E (HAIR SKIN & NAILS GUMMIES PO) Take by mouth   Yes Historical Provider, MD   butalbital-acetaminophen-caffeine (FIORICET,ESGIC) -40 mg per tablet Take 1 tablet by mouth every 4 (four) hours as needed for headaches for up to 10 days 1/1/23 1/11/23 Yes Smith Parker MD   Calcium Citrate 1040 MG TABS Take 500 mg by mouth Three times a day 11/16/22  Yes Historical Provider, MD   DULoxetine (CYMBALTA) 30 mg delayed release capsule Take 60 mg by mouth daily 9/7/22  Yes Historical Provider, MD   estradiol (ESTRACE VAGINAL) 0 1 mg/g vaginal cream Insert 1 g into the vagina 3 (three) times a week 9/28/22  Yes Jonn Braun PA-C   famotidine (PEPCID) 10 mg tablet Take 2 tablets (20 mg total) by mouth daily at bedtime 8/5/22  Yes Elier Medina PA-C   fluorouracil (EFUDEX) 5 % cream Apply topically twice a day for 7 days to the forehead 12/15/22  Yes Loraine Garcia MD   gabapentin (Neurontin) 300 mg capsule Take 1 capsule (300 mg total) by mouth daily at bedtime 9/20/22  Yes Claudeen Berry, DO   insulin aspart (NovoLOG) 100 units/mL injection Inject under the skin 3 (three) times a day before meals 16 units, 16 units, 25 units     Yes Historical Provider, MD   insulin detemir (Levemir FlexTouch) 100 Units/mL injection pen Inject 50 Units under the skin every evening  6/8/21  Yes Historical Provider, MD   levothyroxine 112 mcg tablet Take 112 mcg by mouth every morning 4/6/22  Yes Historical Provider, MD   metoprolol tartrate (LOPRESSOR) 50 mg tablet TAKE 1 TABLET (50 MG TOTAL) BY MOUTH EVERY 12 (TWELVE) HOURS 5/12/22  Yes Yumi Holley MD   olmesartan (BENICAR) 20 mg tablet Take 1 tablet (20mg) by mouth in the morning daily 10/27/22  Yes Yumi Holley MD   olmesartan (BENICAR) 5 mg tablet Take 2 tablets (10 mg total) by mouth daily 10/27/22  Yes Mckenna Siu MD   pantoprazole (PROTONIX) 40 mg tablet TAKE 1 TABLET BY MOUTH TWICE A DAY 10/30/22  Yes Sigrid Osler, PA-C   pramipexole (MIRAPEX) 0 25 mg tablet Take 1 tablet (0 25 mg total) by mouth daily at bedtime 11/22/22  Yes Rincon TC Mccullough   Probiotic Product (PROBIOTIC PO) Take 1 capsule by mouth 2 (two) times a day   Yes Historical Provider, MD   torsemide (DEMADEX) 20 mg tablet TAKE 1 TABLET BY MOUTH EVERY DAY 7/25/22  Yes Mckenna Siu MD   Vitamin D, Ergocalciferol, 2000 units CAPS Take 2,000 Units by mouth daily    Yes Historical Provider, MD   azelastine (ASTELIN) 0 1 % nasal spray 1 spray into each nostril 2 (two) times a day Use in each nostril as directed 9/20/22   Nish Worley DO   BIOTIN PO Take by mouth  Patient not taking: Reported on 1/3/2023    Historical Provider, MD   calcium carbonate (TUMS) 500 mg chewable tablet Chew 1 tablet 2 (two) times a day  Patient not taking: Reported on 9/23/2022    Historical Provider, MD Diaz Arden Oil (Omega-3) 500 MG CAPS Take 1,600 mg by mouth daily  Patient not taking: Reported on 1/3/2023 4/6/22 4/11/23  Historical Provider, MD   rosuvastatin (CRESTOR) 20 MG tablet Take 1 tablet (20 mg total) by mouth daily 4/1/22 10/20/22  Alberto Saravia DO   sucralfate (CARAFATE) 1 g tablet Take 1 tablet (1 g total) by mouth 4 (four) times a day for 10 days Crush tablet and dissolve in [facility] water to make a sluree  Patient not taking: No sig reported 9/10/22 9/20/22  Sia Abad PA-C       VTE Pharmacologic Prophylaxis: Enoxaparin (Lovenox)  VTE Mechanical Prophylaxis: sequential compression device    ==  Sonya Nova DO   [facility] Neurology Residency, PGY-3

## 2023-01-07 NOTE — ASSESSMENT & PLAN NOTE
Present on admission multifactorial likely in the setting of UTI and severe headache  CT head was unremarkable  Neurology was consulted  Patient underwent work-up for headache and subsequently diagnosed with herpes zoster infection  Headache is improved on gabapentin now  Her confusion has resolved

## 2023-01-07 NOTE — PLAN OF CARE
Problem: MOBILITY - ADULT  Goal: Maintain or return to baseline ADL function  Description: INTERVENTIONS:  -  Assess patient's ability to carry out ADLs; assess patient's baseline for ADL function and identify physical deficits which impact ability to perform ADLs (bathing, care of mouth/teeth, toileting, grooming, dressing, etc )  - Assess/evaluate cause of self-care deficits   - Assess range of motion  - Assess patient's mobility; develop plan if impaired  - Assess patient's need for assistive devices and provide as appropriate  - Encourage maximum independence but intervene and supervise when necessary  - Involve family in performance of ADLs  - Assess for home care needs following discharge   - Consider OT consult to assist with ADL evaluation and planning for discharge  - Provide patient education as appropriate  Outcome: Progressing     Problem: Prexisting or High Potential for Compromised Skin Integrity  Goal: Skin integrity is maintained or improved  Description: INTERVENTIONS:  - Identify patients at risk for skin breakdown  - Assess and monitor skin integrity  - Assess and monitor nutrition and hydration status  - Monitor labs   - Provide appropriate hygiene as needed including keeping skin clean and dry  - Evaluate need for skin moisturizer/barrier cream  - Collaborate with interdisciplinary team   - Patient/family teaching  Outcome: Progressing     Problem: Potential for Falls  Goal: Patient will remain free of falls  Description: INTERVENTIONS:  - Educate patient/family on patient safety including physical limitations  - Instruct patient to call for assistance with activity   - Consult OT/PT to assist with strengthening/mobility   - Keep Call bell within reach  - Keep bed low and locked with side rails adjusted as appropriate  - Keep care items and personal belongings within reach  - Initiate and maintain comfort rounds  - Make Fall Risk Sign visible to staff  - Offer Toileting every 2 Hours, in advance of need  - Initiate/Maintain bed alarm  - Obtain necessary fall risk management equipment: walker  - Apply yellow socks and bracelet for high fall risk patients  - Consider moving patient to room near nurses station  Outcome: Progressing     Problem: NEUROSENSORY - ADULT  Goal: Achieves stable or improved neurological status  Description: INTERVENTIONS  - Monitor and report changes in neurological status  - Monitor vital signs such as temperature, blood pressure, glucose, and any other labs ordered   - Initiate measures to prevent increased intracranial pressure  - Monitor for seizure activity and implement precautions if appropriate      Outcome: Progressing

## 2023-01-07 NOTE — ASSESSMENT & PLAN NOTE
Lab Results   Component Value Date    EGFR 27 01/07/2023    EGFR 22 01/06/2023    EGFR 26 01/04/2023    CREATININE 1 82 (H) 01/07/2023    CREATININE 2 12 (H) 01/06/2023    CREATININE 1 88 (H) 01/04/2023   Baseline creatinine between 1 7-2  Creatinine was mildly elevated at 2 1  Encourage oral hydration  Received some gentle IV hydration as well  Subsequent creatinine at 1 8 on the day of discharge  We will get BMP in 1 week  Patient will follow up with her primary care physician next week  She follows up with Dr Darryle Ishihara from nephrology as an outpatient

## 2023-01-07 NOTE — DISCHARGE SUMMARY
[facility]  Discharge- Jenny Villeda 1950, 67 y o  female MRN: [id number]  Unit/Bed#: S -01 Encounter: [id number]  Primary Care Provider: Kristal Sheriff MD   Date and time admitted to hospital: 1/3/2023  4:32 AM    Headache  Assessment & Plan  Patient presented with headache starting approximately 5 days ago  She came to the emergency department on 1/1/23 and was discharged with Fioricet with some improvement in her headache  She returns today with worsening headache with associated left temporal tenderness  He also has some associated confusion  She was found to have mildly elevated ESR of 56 and out of concern for GCA she was given prednisone in the emergency department  Patient also reports some shoulder and low back pain but may be secondary to osteoarthritis  · She was initially started on high-dose IV steroids with concern for giant cell arteritis  Underwent temporal artery ultrasound and temporal artery biopsy  However subsequently developed rash in the distribution of the trigeminal nerve on the left half of the face  Started on valacyclovir for herpes zoster ophthalmicus  Started on gabapentin and headache has improved  * Herpes zoster without complication  Assessment & Plan  Patient broke out in a rash overnight 1/5- 1/6/2023 patient broke out in a rash on limited to the left side of her face and abdomen dermatome pattern involving the left eye  Patient denies visual changes  Plan:  Valtrex  1 g daily to complete 10 days of treatment (dose adjusted per creatinine clearance)  Increased gabapentin to 300 mg p o  3 times daily  DC steroids  Ophthalmology consult due to left eye involvement appreciated  Continue with0 eyedrops for 5 days  Patient is scheduled to follow-up with her ophthalmologist next week  Acute metabolic encephalopathy  Assessment & Plan  Present on admission multifactorial likely in the setting of UTI and severe headache    CT head was unremarkable  Neurology was consulted  Patient underwent work-up for headache and subsequently diagnosed with herpes zoster infection  Headache is improved on gabapentin now  Her confusion has resolved  Hypothyroid  Assessment & Plan  Lab Results   Component Value Date    [id number] 1 884 01/04/2023    FREET4 1 16 03/31/2022     Continue levothyroxine 112    UTI (urinary tract infection)  Assessment & Plan  Patient with suprapubic pain and UA showing normal bacteria and 4-10 WBCs  Patient has history of UTI with Enterobacter cloacae  Patient also has mildly elevated WBC at 12 and some confusion oriented to person only  Given suprapubic tenderness and leukocytosis with positive UA, will treat for suspected UTI  Previous cultures show susceptibility to cefepime  Plan:  · Patient received 3 days of cefepime based on her urine culture and sensitivity results  She was recommended to follow-up with urology as an outpatient because of recurrent UTI  Recommended to continue to use her Estrace cream 3 times weekly  HTN (hypertension)  Assessment & Plan  Blood Pressure: 111/54    Patient with hypertension, continue with home regimen  Torsemide 20 mg, metoprolol 50 mg twice daily, amlodipine 5 mg, 20 mg olmesartan a m  and 10 mg p m  (formulary replacement with losartan)    Plan:  · Continue torsemide  · Continue metoprolol  · Continue amlodipine  · Continue irbesartan  Type 2 diabetes mellitus with diabetic chronic kidney disease [facility])  Assessment & Plan  Lab Results   Component Value Date    HGBA1C 6 7 (H) 11/04/2022       Recent Labs     01/05/23  2133 01/05/23  2323 01/06/23  0722 01/06/23  1119   POCGLU 419* 278* 122 131       Blood Sugar Average: Last 72 hrs:  (P) 252 [id number]'s home diabetes regimen is NovoLog with meals  16 units with breakfast, 16 units with lunch, and 25 units with dinner  Levemir 50 units at bedtime     Initially had hypoglycemia on admission however subsequently has hyperglycemia on IV steroids  Plan:  · Continue home regimen of Humalog 16 units with breakfast, 16 units with lunch, 25 units with dinner  · Continue with detemir 50 units at bedtime  Sliding scale insulin  · Carb controlled diet  · Patient had a blood sugar of 65 prior to lunch  Will give 10 units of Humalog instead of her scheduled 16 units after lunch  · Resume outpatient insulin regimen upon discharge    Stage 4 chronic kidney disease [facility])  Assessment & Plan  Lab Results   Component Value Date    EGFR 27 01/07/2023    EGFR 22 01/06/2023    EGFR 26 01/04/2023    CREATININE 1 82 (H) 01/07/2023    CREATININE 2 12 (H) 01/06/2023    CREATININE 1 88 (H) 01/04/2023   Baseline creatinine between 1 7-2  Creatinine was mildly elevated at 2 1  Encourage oral hydration  Received some gentle IV hydration as well  Subsequent creatinine at 1 8 on the day of discharge  We will get BMP in 1 week  Patient will follow up with her primary care physician next week  She follows up with Dr Doc Hong from nephrology as an outpatient          Medical Problems     Resolved Problems  Date Reviewed: 1/6/2023          Resolved    GERD (gastroesophageal reflux disease) 1/4/2023     Resolved by  Margeret Bence, MD        Discharging Physician / Practitioner: Margeret Bence, MD  PCP: Vanda Chappell MD  Admission Date:   Admission Orders (From admission, onward)     Ordered        01/04/23 1437  Inpatient Admission  Once            01/03/23 1139  Place in Observation  Once                      Discharge Date: 01/07/23    Consultations During Hospital Stay:  · Neurology  · Vascular surgery  · Ophthalmology    Procedures Performed:   · Left temporal artery biopsy    Significant Findings / Test Results:   · Doppler ultrasound of the temporal artery negative for GCA  · CT head negative for any acute intracranial abnormalities    Incidental Findings:   · NA    Test Results Pending at Discharge (will require follow up): · Temporal artery biopsy     Outpatient Tests Requested:  · BMP in 1 week    Complications:none   Reason for Admission: Headache and confusion  Hospital Course:   Jonah Chaney is a 67 y o  female patient who originally presented to the hospital on 1/3/2023 due to headache and confusion  Had elevated ESR and headache predominantly in the left temporal region  Also had some temporal artery tenderness  Initially concern with elevated sed rate for giant cell arteritis  She was empirically started on high-dose IV steroids per neurology recommendation  Surgery was consulted for temporal artery biopsy and she underwent a biopsy on 1 5  Subsequently on the night of 1 5 patient started developing vesicular rash in the left V1 distribution of the trigeminal nerve  She was subsequently diagnosed with herpes zoster ophthalmicus and started on Valtrex dose adjusted per creatinine clearance  She was seen by ophthalmology and no keratitis was noted on exam   Eyedrops were prescribed by ophthalmology  Outpatient ophthalmology and primary care follow-up was recommended  She was discharged on Valtrex and higher dose of gabapentin for neuropathic pain to complete 10 days of treatment  Patient will follow-up with her primary care physician within 1 week of discharge  Please see above list of diagnoses and related plan for additional information  Condition at Discharge: stable    Discharge Day Visit / Exam:   Subjective: Seen and examined  Reports improvement in headache  Denies any eye symptoms  Denies any confusion    Vitals: Blood Pressure: 151/81 (01/07/23 1052)  Pulse: 55 (01/07/23 1052)  Temperature: 98 1 °F (36 7 °C) (01/07/23 0754)  Temp Source: Tympanic (01/05/23 1523)  Respirations: 20 (01/07/23 1052)  Height: 5' 2" (157 5 cm) (01/04/23 1805)  Weight - Scale: 75 kg (165 lb 5 5 oz) (01/03/23 0440)  SpO2: 94 % (01/07/23 1052)  Exam:   Physical Exam  Constitutional:       General: She is not in acute distress  HENT:      Head: Normocephalic  Comments: Vesicular rash on the left forehead scalp left eyelid involving the V1 distribution of trigeminal nerve not crossing the midline  Eyes:      Comments: No conjunctival erythema or injection noted  Cardiovascular:      Rate and Rhythm: Normal rate and regular rhythm  Pulses: Normal pulses  Pulmonary:      Effort: Pulmonary effort is normal       Breath sounds: Normal breath sounds  Abdominal:      General: Abdomen is flat  Bowel sounds are normal       Palpations: Abdomen is soft  Musculoskeletal:         General: Normal range of motion  Right lower leg: No edema  Left lower leg: No edema  Skin:     General: Skin is warm  Neurological:      General: No focal deficit present  Mental Status: She is alert and oriented to person, place, and time  Mental status is at baseline  Psychiatric:         Mood and Affect: Mood normal          Discussion with Family: Updated  () at bedside  Discharge instructions/Information to patient and family:   See after visit summary for information provided to patient and family  Provisions for Follow-Up Care:  See after visit summary for information related to follow-up care and any pertinent home health orders  Disposition:   Home    Planned Readmission:NO   Discharge Statement:  I spent 35 minutes discharging the patient  This time was spent on the day of discharge  I had direct contact with the patient on the day of discharge  Greater than 50% of the total time was spent examining patient, answering all patient questions, arranging and discussing plan of care with patient as well as directly providing post-discharge instructions  Additional time then spent on discharge activities  Discharge Medications:  See after visit summary for reconciled discharge medications provided to patient and/or family        **Please Note: This note may have been constructed using a voice recognition system**

## 2023-01-07 NOTE — ASSESSMENT & PLAN NOTE
- S/P ophthalmology examination who 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 and recommended that the patient be started on Pred forte eye drops 3 times a day for 5 days and to then follow up with them allergy as an outpatient

## 2023-01-07 NOTE — DISCHARGE INSTR - AVS FIRST PAGE
Dear Lucille Segal,     It was our pleasure to care for you here at [facility]  It is our hope that we were always able to exceed the expected standards for your care during your stay  You were hospitalized due to herpes zoster you were cared for on the medical floor under the service of Jamee Weston MD with the [facility] who covers for your primary care physician (PCP), Alden Zuniga MD, while you were hospitalized  If you have any questions or concerns related to this hospitalization, you may contact us at [phone]  For follow up as well as medication refills, we recommend that you follow up with your primary care physician  A registered nurse will reach out to you by phone within a few days after your discharge to answer any additional questions that you may have after going home  However, at this time we provide for you here, the most important instructions / recommendations at discharge:     Notable Medication Adjustments -   Please take Valtrex and higher dose of gabapentin as prescribed  Testing Required after Discharge -   Get blood work in 1 week of discharge  Important follow up information -   Follow-up with ophthalmologist and primary care physician within 1 week of discharge  Other Instructions -   Please continue to monitor your rash and symptoms and if you develop worsening pain, worsening rash with drainage or purulent discharge or severe intractable headache then you need to call your doctor right away or report to the nearest emergency room  Your temporal artery biopsy results are pending at the time of discharge  You will need to follow-up with neurology to discuss the results in 2 weeks    Please review this entire after visit summary as additional general instructions including medication list, appointments, activity, diet, any pertinent wound care, and other additional recommendations from your care team that may be provided for you        Sincerely,     Sheryle Malone, MD

## 2023-01-08 LAB
BACTERIA BLD CULT: NORMAL
BACTERIA BLD CULT: NORMAL

## 2023-01-09 ENCOUNTER — TRANSITIONAL CARE MANAGEMENT (OUTPATIENT)
Dept: FAMILY MEDICINE CLINIC | Facility: CLINIC | Age: 73
End: 2023-01-09

## 2023-01-09 DIAGNOSIS — Z71.89 COMPLEX CARE COORDINATION: Primary | ICD-10-CM

## 2023-01-11 ENCOUNTER — APPOINTMENT (OUTPATIENT)
Dept: LAB | Facility: AMBULARY SURGERY CENTER | Age: 73
End: 2023-01-11

## 2023-01-11 ENCOUNTER — PATIENT OUTREACH (OUTPATIENT)
Dept: FAMILY MEDICINE CLINIC | Facility: CLINIC | Age: 73
End: 2023-01-11

## 2023-01-11 DIAGNOSIS — N17.9 AKI (ACUTE KIDNEY INJURY) (HCC): ICD-10-CM

## 2023-01-11 LAB
ANION GAP SERPL CALCULATED.3IONS-SCNC: 3 MMOL/L (ref 4–13)
BUN SERPL-MCNC: 33 MG/DL (ref 5–25)
CALCIUM SERPL-MCNC: 7.5 MG/DL (ref 8.3–10.1)
CHLORIDE SERPL-SCNC: 105 MMOL/L (ref 96–108)
CO2 SERPL-SCNC: 31 MMOL/L (ref 21–32)
CREAT SERPL-MCNC: 1.49 MG/DL (ref 0.6–1.3)
GFR SERPL CREATININE-BSD FRML MDRD: 34 ML/MIN/1.73SQ M
GLUCOSE SERPL-MCNC: 130 MG/DL (ref 65–140)
POTASSIUM SERPL-SCNC: 4 MMOL/L (ref 3.5–5.3)
SODIUM SERPL-SCNC: 139 MMOL/L (ref 135–147)

## 2023-01-11 NOTE — PROGRESS NOTES
HRR referral for CCM    Chart was reviewed and this [facility] RN called patient and introduced self and explained the role of the [facility] RN and complex case management  She states understanding and declines any need for services or follow up  Patient states she is independent of ADL's and ambulates without assistance  She has all her medications and takes them as directed  She states her  can help her with her medications if need be  She is not home bound  She does not drive  Her  transports her to all appointments  Patient declines CCM and no episode opened

## 2023-01-12 ENCOUNTER — OFFICE VISIT (OUTPATIENT)
Dept: FAMILY MEDICINE CLINIC | Facility: CLINIC | Age: 73
End: 2023-01-12

## 2023-01-12 VITALS
OXYGEN SATURATION: 98 % | HEIGHT: 62 IN | DIASTOLIC BLOOD PRESSURE: 70 MMHG | BODY MASS INDEX: 30.36 KG/M2 | HEART RATE: 72 BPM | SYSTOLIC BLOOD PRESSURE: 110 MMHG | WEIGHT: 165 LBS | TEMPERATURE: 97.8 F

## 2023-01-12 DIAGNOSIS — B02.9 HERPES ZOSTER WITHOUT COMPLICATION: ICD-10-CM

## 2023-01-12 RX ORDER — GABAPENTIN 300 MG/1
CAPSULE ORAL
Qty: 120 CAPSULE | Refills: 0 | Status: SHIPPED | OUTPATIENT
Start: 2023-01-12

## 2023-01-12 NOTE — PATIENT INSTRUCTIONS
Increase gabapentin to 300 mg morning, 300 mg afternoon, 600 mg at night  If the pain is severe over the weekend and you tolerate the above ok, increase to 600 mg in the morning and evening, with 300 mg in the afternoon  Update me next week on how you're feeling

## 2023-01-12 NOTE — PROGRESS NOTES
Assessment & Plan     1  Herpes zoster without complication  Assessment & Plan:  Patient Instructions   Increase gabapentin to 300 mg morning, 300 mg afternoon, 600 mg at night  If the pain is severe over the weekend and you tolerate the above ok, increase to 600 mg in the morning and evening, with 300 mg in the afternoon  Update me next week on how you're feeling  Pt continues with significant discomfort  Instructions as above  She saw ophtho and thankfully was told that there is no ocular involvement  She will plan on getting shingrix vaccine once she is fully recovered  Orders:  -     gabapentin (Neurontin) 300 mg capsule; Take one cap PO in the morning and afternoon and two caps PO in the evening       Subjective     Transitional Care Management Review:   Yvan Neri is a 67 y o  female here for TCM follow up  Colleen Kline is here today with her  to follow-up for her recent hospitalization for head pain  She had first been seen in the ER on 1/1/2023 for headache and was discharged with Fioricet which has helped a bit  She returned on 1/3/2023 with increased head pain and left temporal pain and tenderness  At the time there was a concern that she had some confusion/change in mental status  In the ER, she had CTs of the head, neck, and facial bones  These did not show any concerns  She was then started on high-dose IV steroids with a concern that with she could have giant cell arteritis  She had biopsy for this  I have reviewed the pathology with her and this is thankfully negative  She later developed rash on her head consistent with shingles  This was on the top of her scalp/temporal region/periorbital region  Unfortunately she continues with significant pain today  She notes that the gabapentin has been helpful  She is taking 300 mg capsules one cap 3 times daily  She is having hard time sleeping at night because of the pain      She saw ophthalmology and they determined that there was no ocular involvement  She does note the bright lights are bothersome for her  She is mostly just trying to rest at home  Gabapentin helps in daytime more than the night  During the TCM phone call patient stated:  TCM Call     Date and time call was made  1/9/2023 11:10 AM    Hospital care reviewed  Records reviewed    Patient was hospitialized at  [address] [place] [address]    Date of Admission  01/03/23    Date of discharge  01/07/23    Diagnosis  herpes zoster    Disposition  Home    Were the patients medications reviewed and updated  Yes    Current Symptoms  None    Dizziness severity  Mild      TCM Call     Post hospital issues  None    Should patient be enrolled in anticoag monitoring? No    Scheduled for follow up? Yes    Patients specialists  Nephrologist    Referrals needed  Faye Cartagena MA     Did you obtain your prescribed medications  Yes    Do you need help managing your prescriptions or medications  No    Is transportation to your appointment needed  No    I have advised the patient to call PCP with any new or worsening symptoms  k cosme    Living Arrangements  Spouse or Significiant other    Support System  Partner; Family    The type of support provided  Physical; Emotional; Financial    Do you have social support  Yes, as much as I need    Are you recieving any outpatient services  No    Are you recieving home care services  No    Are you using any community resources  No    Current waiver services  No    Have you fallen in the last 12 months  No    Interperter language line needed  No        HPI  Review of Systems    Objective     /70 (BP Location: Left arm, Patient Position: Sitting, Cuff Size: Adult)   Pulse 72   Temp 97 8 °F (36 6 °C)   Ht 5' 2" (1 575 m)   Wt 74 8 kg (165 lb)   SpO2 98%   BMI 30 18 kg/m²      Physical Exam  Vitals and nursing note reviewed     Constitutional:       Comments: Appears uncomfortable   Eyes:      Extraocular Movements: Extraocular movements intact  Conjunctiva/sclera: Conjunctivae normal    Cardiovascular:      Rate and Rhythm: Normal rate and regular rhythm  Pulmonary:      Effort: Pulmonary effort is normal       Breath sounds: Normal breath sounds  No wheezing or rhonchi  Lymphadenopathy:      Cervical: No cervical adenopathy  Skin:     Comments: Erythematous scabbed/dried lesions over the V1 distribution (left)   Neurological:      Mental Status: She is alert        Comments: Tenderness to light touch over V1 distribution       Medications have been reviewed by provider in current encounter    Carolyn Lutz MD

## 2023-01-15 NOTE — ASSESSMENT & PLAN NOTE
Patient Instructions   Increase gabapentin to 300 mg morning, 300 mg afternoon, 600 mg at night  If the pain is severe over the weekend and you tolerate the above ok, increase to 600 mg in the morning and evening, with 300 mg in the afternoon  Update me next week on how you're feeling  Pt continues with significant discomfort  Instructions as above  She saw ophtho and thankfully was told that there is no ocular involvement  She will plan on getting shingrix vaccine once she is fully recovered

## 2023-01-19 ENCOUNTER — TELEPHONE (OUTPATIENT)
Dept: NEUROLOGY | Facility: CLINIC | Age: 73
End: 2023-01-19

## 2023-01-19 NOTE — TELEPHONE ENCOUNTER
Good morning Alexi Moser  Patient was in the hospital 1/3-1/7/23 consulted by Juhi Ramires  I dont see hfu instructions:     Recommendations for outpatient neurological follow up have yet to be determined    If you can please provide instructions   Thanks

## 2023-01-25 ENCOUNTER — TELEPHONE (OUTPATIENT)
Dept: NEUROLOGY | Facility: CLINIC | Age: 73
End: 2023-01-25

## 2023-01-25 NOTE — TELEPHONE ENCOUNTER
pt left message, difficult to hear pt  [phone]    Called pt, she states that she received an automated call and she thought that it wanted her to call us back  I advised that it was most likely the automated appt reminder call    She confirmed that she will be at appt on 1/30

## 2023-01-30 ENCOUNTER — OFFICE VISIT (OUTPATIENT)
Dept: NEUROLOGY | Facility: CLINIC | Age: 73
End: 2023-01-30

## 2023-01-30 VITALS
TEMPERATURE: 97.8 F | OXYGEN SATURATION: 94 % | DIASTOLIC BLOOD PRESSURE: 70 MMHG | HEIGHT: 62 IN | HEART RATE: 70 BPM | BODY MASS INDEX: 30.91 KG/M2 | SYSTOLIC BLOOD PRESSURE: 118 MMHG | WEIGHT: 168 LBS

## 2023-01-30 DIAGNOSIS — B02.29 HZV (HERPES ZOSTER VIRUS) POST HERPETIC NEURALGIA: Primary | ICD-10-CM

## 2023-01-30 NOTE — PROGRESS NOTES
Patient ID: Stacia Elaine is a 67 y o  female  Assessment/Plan:    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  Stacia Elaine is a 66 y/o female with arthritis, CKD, COPD, depression, DM, neuropathy, hypothyroidism, GERD, HTN, HLD, RLS, who presented with HZV involving the V1 distribution of her face/forehead and scalp, now with post herpetic neuralgia described as pressure  She is currently on Gabapentin 300 mg daily with little relief, however did not tolerate when she took 1200 mg in 1 day  It is likely she did not gradually increase to this dose and therefore suffered dizziness and unsteadiness  I have asked her to increase her gabapentin to either 300 mg bid or she can take a total of 600 mg at bedtime if she prefers  She will continue to use Tylenol as needed  Since she has found some benefit with applying pressure to this area, she could consider wearing a headband or migraine "cold cap" which may provide further pain relief  Her HZV was already adequately treated, with no remaining recommendations for treatment  We did discuss that with time her nerve dysfunction should continue to improve  Plan as outlined below  Plan:  - Increase Gabapentin to 300 mg twice daily or 600 mg at bedtime  - Continue to use Tylenol as needed  - Consider using a "cold cap" or head band  - Follow up with your PCP  - Follow up with Neurology as needed         Diagnoses and all orders for this visit:    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         I have spent 30 minutes in face to face time and chart review with this patient today  Subjective:    HPI Stacia Elaine is a 66 y/o female with arthritis, CKD, COPD, depression, DM, neuropathy, hypothyroidism, GERD, HTN, HLD, RLS, who presents with left sided headache x 5 days and new onset confusion on 1/3/23  There was concern for possible GCA, therefore, patient was started on steroids and underwent temporal artery biopsy   Over night 1/5-1/6, she developed a rash on the left face/eye that is concerning for herpes zoster      Plan:  - S/p Solumedrol; dc'd 1/6  - S/p temporal artery biopsy- negative  - Started on valcyclovir 1000 mg daily (adjusted for renal function) per primary team  - S/p Tylenol 650 mg in ED x 1  - S/p Prednisone 60 mg in ED x 1  - S/P ophthalmology examination who 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 and recommended that the patient be started on Pred forte eye drops 3 times a day for 5 days and to then follow up with them allergy as an outpatient  - Monitor neuro exam; notify with any changes  - Medical management and supportive care per primary team  Correction of any metabolic or infectious disturbances       Results:  - CT head: No acute intracranial abnormality   Mild microangiopathic changes are similar to the prior study  - Temporal artery biopsy:   • RIGHT SIDE:  There is no evidence of giant cell arteritis, aneurysm, or significant stenosis noted in the superficial temporal artery and its branches  • LEFT SIDE:  There is no evidence of giant cell arteritis, aneurysm, or significant stenosis noted in the superficial temporal artery and its branches  - 1/1 Labs: ESR 31, CRP 1 4       - 1/3 Labs: WBC 12 06, UA concerning for UTI, AST 64, , ESR 56, COVID/flu/RSV negative      Patient presents to the office today accompanied by her  in hospital follow up  She states she continues to have pressure in the left V1 distribution of her forehead and scalp  She denies any burning and states her pain is improved with applying light pressure such as with her hand  3/10 in intensity; compared to 10/10 initially, do does feel it is gradually improving  She is using Tylenol 500 mg 1-2 times daily, and Gabapentin 300 mg daily  She states she tried to take 300 mg in the morning and afternoon and 600 mg at bedtime and felt very disoriented and unsteady on her feet so she reduced the dose to just 300 mg daily   Unfortunately 300 mg daily does not appear to be effective  At baseline she takes Cymbalta 60 mg for depression  She did complete Valacyclovir 1 g daily x 10 days,5 days of eye drops pred forte tid and Solumedrol 500 mg IV daily x 3 days (during her admission)  Since discharge she has followed up with ophthalmology who does not believe their was eye involvement in her herpes zoster  Her rash is now resolved, there is some residual redness  She denies any other focal neurologic, motor or sensory symptoms today  The following portions of the patient's history were reviewed and updated as appropriate: allergies, current medications, past family history, past medical history, past social history and past surgical history  Objective:    Blood pressure 118/70, pulse 70, temperature 97 8 °F (36 6 °C), temperature source Temporal, height 5' 2" (1 575 m), weight 76 2 kg (168 lb), SpO2 94 %  Neurological Exam    On neurological examination patient is alert, awake, oriented and in no distress  Speech is fluent without dysarthria or aphasia  Cranial nerves 2-12 were symmetrically intact bilaterally, with the exception of hypersensitivity of the left V1 distribution of her forehead and scalp compared to the contralateral side  V2 and V3 distributions were symmetric bilaterally  No evidence of any focal weakness or sensory loss in the upper or lower extremities  Motor testing reveals 5/5 strength of the bilateral upper and lower extremities  There was no pronator drift  No fasciculations present  No abnormal involuntary movements  Finger- to-nose reveals no tremor or ataxia and intact proprioceptive function, no dysmetria was noted  Rapid alternating movement normal  Sensation was intact to vibration, light touch, and temperature in bilateral upper and lower extremities  Deep tendon reflexes were 1+ and symmetric in the bilateral upper and lower extremities  She is able to rise easily without assistance from a seated position  Casual gait is normal including stance, stride, and arm swing  Romberg is absent  ROS:    Review of Systems   Constitutional: Negative  Negative for appetite change and fever  HENT: Negative  Negative for hearing loss, tinnitus, trouble swallowing and voice change  Eyes: Negative for photophobia, pain and visual disturbance  Respiratory: Negative  Negative for shortness of breath  Cardiovascular: Negative  Negative for palpitations  Gastrointestinal: Negative  Negative for nausea and vomiting  Endocrine: Negative  Negative for cold intolerance  Genitourinary: Negative  Negative for dysuria, frequency and urgency  Musculoskeletal: Negative  Negative for gait problem, myalgias and neck pain  Skin: Negative  Negative for rash  Allergic/Immunologic: Negative  Neurological: Positive for headaches (head pain)  Negative for dizziness, tremors, seizures, syncope, facial asymmetry, speech difficulty, weakness, light-headedness and numbness  Hematological: Negative  Does not bruise/bleed easily  Psychiatric/Behavioral: Negative  Negative for confusion, hallucinations and sleep disturbance  Reviewed ROS as entered by medical assistant

## 2023-01-30 NOTE — PATIENT INSTRUCTIONS
- Increase Gabapentin to 300 mg twice daily or 600 mg at bedtime  - Continue to use Tylenol as needed  - Consider using a "cold cap" or head band  - Follow up with your PCP  - Follow up with Neurology as needed

## 2023-01-31 PROBLEM — B02.29 HZV (HERPES ZOSTER VIRUS) POST HERPETIC NEURALGIA: Status: ACTIVE | Noted: 2023-01-06

## 2023-01-31 NOTE — ASSESSMENT & PLAN NOTE
Pierce Cruz is a 68 y/o female with arthritis, CKD, COPD, depression, DM, neuropathy, hypothyroidism, GERD, HTN, HLD, RLS, who presented with HZV involving the V1 distribution of her face/forehead and scalp, now with post herpetic neuralgia described as pressure  She is currently on Gabapentin 300 mg daily with little relief, however did not tolerate when she took 1200 mg in 1 day  It is likely she did not gradually increase to this dose and therefore suffered dizziness and unsteadiness  I have asked her to increase her gabapentin to either 300 mg bid or she can take a total of 600 mg at bedtime if she prefers  She will continue to use Tylenol as needed  Since she has found some benefit with applying pressure to this area, she could consider wearing a headband or migraine "cold cap" which may provide further pain relief  Her HZV was already adequately treated, with no remaining recommendations for treatment  We did discuss that with time her nerve dysfunction should continue to improve  Plan as outlined below       Plan:  - Increase Gabapentin to 300 mg twice daily or 600 mg at bedtime  - Continue to use Tylenol as needed  - Consider using a "cold cap" or head band  - Follow up with your PCP  - Follow up with Neurology as needed

## 2023-02-02 ENCOUNTER — TELEPHONE (OUTPATIENT)
Dept: FAMILY MEDICINE CLINIC | Facility: CLINIC | Age: 73
End: 2023-02-02

## 2023-02-02 NOTE — TELEPHONE ENCOUNTER
Patient had called in, she went to answer the door and tripped  Patient stated she hit the back of the head  No dizziness  Blood pressure is currently 159/93  Directed the patient to an urgent care due to no availability

## 2023-02-06 NOTE — PROGRESS NOTES
RENAL FOLLOW UP NOTE: td    • ASSESSMENT AND PLAN:  1   CKD stage 4 :  • Etiology:  Diabetic nephropathy/hypertensive nephrosclerosis/arteriolar nephrosclerosis/chronic NSAID use   No evidence of obstructive uropathy, renal artery disease or primary glomerular disease with a bland urinalysis  Of note, CPK was normal at 105 no evidence rhabdomyolysis    • Baseline creatinine: 1 5-2 0  • Current creatinine:  1 76 at baseline  • Urine protein creatinine ratio: 1 47 which is now above goal again  • UA demonstrated no significant hematuria but 3+ proteinuria from  • Serologies:  Normal C3/C4; negative rheumatoid factor; negative SPEP:  Negative UPEP;  Light chain ratio 2 09 essentially normal for chronic kidney disease  negative EWELINA; negative hepatitis-C; normal IgA; normal ASO; negative RPR  Recommendations:  • Treat hypertension-please see below  • Treat dyslipidemia-please see below  • Maintain proteinuria less than 1 g or as low as possible: Adjust antihypertensive regimen in order to try to get to proteinuria less than 1 g please see below  • Avoid nephrotoxic agents such as NSAIDs, patient counseled as such     2   Volume:   • Current status:  Euvolemic  • Torsemide dose:  Continue current dose 20 mg daily  3   Hypertension:  Workup:  • saline suppression test for primary aldosterone state was normal  • plasma free metanephrines was negative  • renal artery duplex was negative 02/2019       Current blood pressure averages:   A m :   146/78, no orthostatic changes  P m : 130/67, standing 117/67  Heart rate: Heart rate 50-60 range        • Goal blood pressure:  less IQCK 176/23 given less than 1 g of proteinuria at this time  Recommendations:  • Push nonmedical regimen including weight loss, isotonic exercise and avoidance of salt; patient counseled as such  • Medication changes today:   • I will increase olmesartan to 40 mg at bedtime to help the morning readings as the evening readings are actually low normal, this will also help proteinuria  • To consider MRA in the future  4   Electrolytes:  all acceptable including a potassium of 3 7  5   Mineral bone disorder:  • Calcium/magnesium/phosphorus:  All acceptable, except for high normal magnesium avoid all magnesium products  • PTH intact:   110 7 just monitor for now  • Vitamin-D:  40 8  6   Dyslipidemia:  • Goal LDL:  Less than 100  • Current lipid profile:  LDL not able to calculate/HDL 33/triglycerides 416  Recommendations:  Per Endocrinology but essentially at goal  7   Anemia:   • Current hemoglobin 13 4 which is normal  8   Other problems:  • Depression  • Diabetes mellitus per primary medical physician  • Hypothyroidism  • BARBARA on CPAP  • Fibromyalgia/osteoarthritis:  Patient with myoclonic movements, seems related to gabapentin it was adjusted with resolution  • Nephrolithiasis  • Basal cell/squamous CA of the skin  • Urinary stress incontinence  • Status post incisional hernia repairs  • hospitalization as outlined below from severe GERD with hypoxemia and shortness of breath from a failed Nissen fundoplication from prior hiatal hernia repair   Patient is due to have further testing with an EGD by GI and then subsequently thoracic surgery consultation for possible repair(however, according to the patient at this juncture she was felt I risk so no surgery is planned at this time)  In addition, she was placed on Protonix 40 b i d  and Pepcid 40 mg hs  • Hospitalization on 07/14/2020 secondary confusion at home   Apparently she had protracted hospital course in [address] [place] [address] following aspiration pneumonia   Ten days in the ICU on a ventilator   No overt infectious process   Low probability for pulmonary embolus despite an elevated D-dimer   Had mild leukocytosis/SIRS criteria subsequently discharged 1 day later when she clinically improved   Symptoms were felt secondary to her protracted ICU course    •  hospitalization with discharge on 05/18/21:  • Epigastric pain following an EGD on 05/13/2021 for Botox injection at the pylorus for treatment for gastroparesis   Apparently associated with chocolate ingestion and possible cough with aspiration  • Complicated by ANDRA with creatinine to 2 6 which improved with IV fluids TO 1 92 ON 05/18/2021  •  Hospitalization 04/13/2022: With hypotension fatigue noted to have a low blood pressure mild increasing creatinine, dysuria treated with Bactrim but had an allergic reaction to it as an outpatient she received intravenous ceftriaxone and she was discharged on doxycycline  • Recently placed on spironolactone for blood pressure all medications were held decide from metoprolol  Amlodipine was resumed, torsemide re-initiated, but spironolactone was stopped  Maintain off hydralazine possibly add olmesartan  • Creatinine initially as high as 2 27 reduce down to 2 16 upon discharge  · Patient admitted 1/7/2023 secondary headache and confusion left temporal symptoms and temporal artery tenderness with an elevated ESR started empirically on steroids per neurology recommendation status post temporal artery biopsy  However she developed a vesicular rash felt to have herpes zoster ophthalmologists started on Valtrex seen by ophthalmology eyedrops also initiated  Patient discharged on Valtrex and high-dose gabapentin with follow-up with her primary care physician  · Patient with leukocytosis most compatible with steroids  · Patient with elevated liver function studies: I would hold rosuvastatin, check right upper quadrant ultrasound and may require GI consultation  ? Fatty liver        GI health maintenance:  Last colonoscopy: 7/30/2021       PATIENT INSTRUCTIONS:    Patient Instructions   1  Medication changes today:  • Continue to hold simvastatin for now as we monitor your liver tests  • Please increase olmesartan/Benicar to a total of 40 mg at bedtime    I did send your pharmacy a 40 mg tablet prescription; however until that time you can use either 2-20 mg pills or 120 mg pill and to the 10 mg pills for a total of 40 mg  Please go for a urine test now to make sure you do not have a urine infection    2  Please go for non fasting  lab work 1-2 weeks after making the above medication change  3   Please go for an ultrasound of your liver we will help to facilitate    Depending upon your liver tests, we may refer you to a liver/gastroenterology specialist/Dr Florence Tran     4  Please contact your primary medical physician to follow-up with the several medical symptoms that you are experiencing I did send her a message as well    5  Please take 1 week a blood pressure readings 3 to 4 weeks after making the above medication adjustments    AS FOLLOWS  MORNING AND EVENING, SITTING AND STANDING as follows:  · TAKE THE MORNING READINGS BEFORE ANY MEDICATIONS AND WHEN YOU ARE RELAXED FOR SEVERAL MINUTES  · TAKE THE EVENING READINGS:  BETWEEN 7-10 P M ; PRIOR TO ANY MEDICATIONS; AT LEAST IN OUR  FROM DINNER; AND CERTAINLY AFTER RELAXING FOR A FEW MINUTES  · PLEASE INCLUDE HEART RATE WITH YOUR BLOOD PRESSURE READINGS  · When taking standing readings, keep your arm supported at heart level and not dangling  · Make sure you are sitting with your back supported and feet on the ground and do not cross your legs or feet  · Make sure you have not taken any coffee or caffeine products or exercised or smoke cigarettes at least 30 minutes before taking your blood pressure  Then please mail these readings into the office    6  Follow-up in 4 months  • Please bring in 1 week a blood pressure readings morning evening, sitting and standing is outlined above  • PLEASE BRING AN YOUR BLOOD PRESSURE MACHINE TO CORRELATE WITH THE OFFICE MACHINE AT THIS NEXT SCHEDULED VISIT  • Please go for fasting lab work 1-2 weeks prior to your appointment      7    General instructions:  • AVOID SALT BUT NOT ADDING AN READING LABELS TO MAKE SURE THERE IS LOW-SALT IN THE FOOD THAT YOU ARE EATING  o Goal is less than 2 g of sodium intake or less than 5 g of sodium chloride intake per day    • Avoid nonsteroidal anti-inflammatory drugs such as Naprosyn, ibuprofen, Aleve, Advil, Celebrex, Meloxicam (Mobic) etc   You can use Tylenol as needed if you do not have any liver condition to be concerned about    • Avoid medications such as Sudafed or decongestants and antihistamines that contained the D component which is the decongestant  You can take antihistamines without the decongestant or D component  • Try to avoid medications such as pantoprazole or  Protonix/Nexium or Esomeprazole)/Prilosec or omeprazole/Prevacid or lansoprazole/AcipHex or Rabeprazole  If you are able to, use Pepcid as this is safer for your kidneys  • Try to exercise at least 30 minutes 3 days a week to begin with with an ultimate goal of 5 days a week for at least 30 minutes    • Try to lose 5-10 lb by your next visit    • Please do not drink more than 2 glasses of alcohol/wine on a daily basis as this may contribute to your high blood pressure  Subjective: The patient had H  zoster about January 4 of her face improved significantly  She had been treated off medications    No fevers, chills, she has minor nasal congestion and slight cough  Good appetite and poor energy (over the last couple of months)  Episode of incontinence the other night, usually stress incontinence, some urinary frequency and some burning, no blood in the urine  She is scheduled to see urology in the next few weeks    GERD type symptoms with gas no nausea vomiting or diarrhea  No cardiovascular symptoms including swelling of the legs  No headaches, dizziness or lightheadedness, but some unsteadiness  Blood pressure medications:  • Torsemide 20 mg daily in the morning  • Rosuvastatin had just been stopped a few days ago due to increased liver function studies  • Olmesartan is 20 mg in the morning and 10 mg the evening  • Lopressor 50 mg twice a day  • Amlodipine 5 mg at dinnertime      ROS:  See HPI, otherwise review of systems as completely reviewed with the patient are negative    Past Medical History:   Diagnosis Date   • Allergic    • Allergic rhinitis    • Anesthesia     pt reports "had to use double lumen endo tube for hiatal hernia repair/so surgeon could get to where he needed to work"   • Arthritis    • Aspiration into airway    • Basal cell carcinoma 2007    left cheek    • BCC (basal cell carcinoma) 05/27/2021    Left Nasal tip   • [facility] )     squamous cell cancer on forhead   • Cancer ([facility] )     basal cell on nose    • Chronic kidney disease 2000, 2018    Stones, kidney disease stage 4   • Chronic pain disorder     bilat feet and joint pain on occas   • Colon polyp    • Constipation    • COPD (chronic obstructive pulmonary disease) ([facility] )    • COVID-19 08/2021    recovered at home/did receive monoclonal infusion   • Dental bridge present    • Depression    • Diabetes mellitus ([place] [address] )     Type 2   • Diabetic neuropathy ([place] [address] )    • Disease of thyroid gland    • Family history of thyroid problem    • Fatty liver    • GERD (gastroesophageal reflux disease)    • Heart burn    • Hiatal hernia    • History of pneumonia    • Hyperlipidemia    • Hyperplasia, parathyroid ([facility]  )    • Hypertension    • Kidney problem    • Kidney stone    • Memory loss Julu2 2020   • Motion sickness    • Motion sickness    • Neck pain    • Obesity 1978   • Obesity (BMI 30 0-34  9)    • Pollen allergies    • RLS (restless legs syndrome)    • SCC (squamous cell carcinoma) 05/04/2021    left mid forehead   • Seasonal allergies    • Sleep apnea     has inspire   • Squamous cell skin cancer 2007    left cheek    • Swollen ankles    • 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, left     great toe fusion   • Urinary incontinence    • Urinary tract infection 03/28/2022   • Wears glasses      Past Surgical History:   Procedure Laterality Date   • ARTHROSCOPY KNEE     • BREAST BIOPSY      stereotactic-benign   • BREAST BIOPSY      stereo-benign   • BREAST EXCISIONAL BIOPSY      unknown date-benign   • BREAST EXCISIONAL BIOPSY      unknown date-benign   • BREAST EXCISIONAL BIOPSY      unknown date-benign   • BREAST EXCISIONAL BIOPSY      unknown age-benign   • CARDIAC CATHETERIZATION     • CHOLECYSTECTOMY     • COLONOSCOPY     • EXAMINATION UNDER ANESTHESIA N/A 06/24/2021    Procedure: EXAM UNDER ANESTHESIA (EUA), DISE;  Surgeon: Cassi Reece MD;  [facility];  Service: ENT   • HERNIA REPAIR     • HIATAL HERNIA REPAIR     • KNEE SURGERY      Torn maniscus lap surg   • LIPOSUCTION     • LYMPH NODE BIOPSY     • MOHS SURGERY  05/20/2021    left mid forehead-Gautam   • MOHS SURGERY Left 05/27/2021    Left nasal tip- gautam    • AL LIGATION/BIOPSY TEMPORAL ARTERY Left 1/5/2023    Procedure: BIOPSY ARTERY TEMPORAL;  Surgeon: Sherman Alas DO;  [facility];  Service: Vascular   • AL OPEN IMPLANTATION CRANIAL NERVE VASQUEZ & PULSE GEN N/A 11/10/2021    Procedure: INSERTION UPPER AIRWAY STIMULATOR, INSPIRE IMPLANT;  Surgeon: Cassi Reece MD;  [facility];  Service: ENT   • AL UNLISTED PROCEDURE FOOT/TOES Right 07/19/2022    Procedure: 1st metatarsal phalangeal joint fusion;  Surgeon: Fransisco Lorenzo DPM;  [facility];  Service: Podiatry   • REDUCTION MAMMAPLASTY Bilateral 2000   • REDUCTION MAMMAPLASTY     • SKIN BIOPSY     • SKIN CANCER EXCISION  2007    squamous cell carcinoma    • SKIN CANCER EXCISION  2007    basal cell carcinoma   • SQUAMOUS CELL CARCINOMA EXCISION     • TOE SURGERY Right 08/04/2022    Right Great Toe Fusion   • TONSILLECTOMY     • TUBAL LIGATION     • UPPER GASTROINTESTINAL ENDOSCOPY     • US GUIDED BREAST BIOPSY RIGHT COMPLETE Right 07/18/2022     Family History   Problem Relation Age of Onset   • Heart disease Mother    • Depression Mother    • Hypertension Mother    • COPD Mother    • Hearing loss Mother    • Anxiety disorder Mother    • Heart disease Father    • Lung cancer Father 79        Smoker    • Cancer Father         brain   • Alcohol abuse Father    • Dementia Father    • Hypertension Father    • Thyroid disease Father    • COPD Father    • Arthritis Father    • Brain cancer Father 76   • Hypertension Sister    • Diabetes Sister    • Heart disease Sister    • Thyroid disease Sister    • Cancer Sister         Lympoma, lung   • Lung cancer Sister 78   • Anxiety disorder Sister    • Hypertension Brother    • Diabetes Brother    • Cancer Brother         Throat   • Dementia Brother    • Stroke Brother    • Hypertension Brother    • Heart disease Brother    • Diabetes Brother    • No Known Problems Son    • No Known Problems Son    • Brain cancer Paternal Aunt         unknown age   • Breast cancer Neg Hx       reports that she quit smoking about 47 years ago  Her smoking use included cigarettes  She started smoking about 55 years ago  She has a 20 00 pack-year smoking history  She has never used smokeless tobacco  She reports current alcohol use  She reports that she does not use drugs  I COMPLETELY REVIEWED THE PAST MEDICAL HISTORY/PAST SURGICAL HISTORY/SOCIAL HISTORY/FAMILY HISTORY/AND MEDICATIONS  AND UPDATED ALL    Objective:     Vitals:   BP sitting on right: 140/78 with a heart rate of 60 and regular  BP standing on right: 138/80 with a heart rate of 64 and right    Weight (last 2 days)     Date/Time Weight    02/13/23 1304 74 8 (165)        Wt Readings from Last 3 Encounters:   02/13/23 74 8 kg (165 lb)   01/30/23 76 2 kg (168 lb)   01/12/23 74 8 kg (165 lb)       Body mass index is 30 18 kg/m²      Physical Exam: General:  No acute distress/obese  Skin:  No acute rash  Eyes:  No scleral icterus, noninjected, no discharge from eyes  ENT:  Moist mucous membranes  Neck:  Supple, no jugular venous distention, trachea is midline, no lymphadenopathy and no thyromegaly  Back   No CVAT  Chest:  Clear to auscultation and percussion, good respiratory effort  CVS:  Regular rate and rhythm without a rub, or gallops or murmurs  Abdomen:  Obese,Soft and nontender with normal bowel sounds  Extremities:  No cyanosis and no edema, no arthritic changes, normal range of motion  Neuro:  Grossly intact  Psych:  Alert, oriented x3 and appropriate      Medications:    Current Outpatient Medications:   •  acetaminophen (TYLENOL) 325 mg tablet, Take 2 tablets (650 mg total) by mouth every 6 (six) hours as needed for mild pain, headaches or fever, Disp: , Rfl: 0  •  albuterol (Ventolin HFA) 90 mcg/act inhaler, Inhale 2 puffs every 6 (six) hours as needed for wheezing, Disp: 54 g, Rfl: 0  •  amLODIPine (NORVASC) 5 mg tablet, Take 1 tablet (5 mg total) by mouth daily, Disp: 90 tablet, Rfl: 0  •  b complex vitamins capsule, Take 1 capsule by mouth daily, Disp: , Rfl:   •  B-D ULTRAFINE III SHORT PEN 31G X 8 MM MISC, USE AS DIRECTED 4 TIMES PER DAY, Disp: , Rfl: 3  •  Calcium Citrate 1040 MG TABS, Take 500 mg by mouth Three times a day, Disp: , Rfl:   •  DULoxetine (CYMBALTA) 30 mg delayed release capsule, Take 60 mg by mouth daily, Disp: , Rfl:   •  gabapentin (Neurontin) 300 mg capsule, Take one cap PO in the morning and afternoon and two caps PO in the evening, Disp: 120 capsule, Rfl: 0  •  insulin aspart (NovoLOG) 100 units/mL injection, Inject under the skin 3 (three) times a day before meals 16 units, 16 units, 25 units  , Disp: , Rfl:   •  insulin detemir (Levemir FlexTouch) 100 Units/mL injection pen, Inject 50 Units under the skin every evening , Disp: , Rfl:   •  levothyroxine 112 mcg tablet, Take 112 mcg by mouth every morning, Disp: , Rfl:   •  metoprolol tartrate (LOPRESSOR) 50 mg tablet, TAKE 1 TABLET (50 MG TOTAL) BY MOUTH EVERY 12 (TWELVE) HOURS, Disp: 180 tablet, Rfl: 3  •  olmesartan (BENICAR) 40 mg tablet, Take 1 tablet (40 mg total) by mouth daily at bedtime, Disp: 90 tablet, Rfl: 3  •  pantoprazole (PROTONIX) 40 mg tablet, TAKE 1 TABLET BY MOUTH TWICE A DAY, Disp: 180 tablet, Rfl: 2  •  pramipexole (MIRAPEX) 0 25 mg tablet, Take 1 tablet (0 25 mg total) by mouth daily at bedtime, Disp: 90 tablet, Rfl: 0  •  Probiotic Product (PROBIOTIC PO), Take 1 capsule by mouth 2 (two) times a day, Disp: , Rfl:   •  torsemide (DEMADEX) 20 mg tablet, TAKE 1 TABLET BY MOUTH EVERY DAY, Disp: 90 tablet, Rfl: 3  •  Vitamin D, Ergocalciferol, 2000 units CAPS, Take 2,000 Units by mouth daily , Disp: , Rfl:   •  rosuvastatin (CRESTOR) 20 MG tablet, Take 1 tablet (20 mg total) by mouth daily (Patient not taking: Reported on 2/13/2023), Disp: 30 tablet, Rfl: 0    Lab, Imaging and other studies: I have personally reviewed pertinent labs    Laboratory Results:  Results for orders placed or performed in visit on 02/09/23   Comprehensive metabolic panel   Result Value Ref Range    Sodium 139 135 - 147 mmol/L    Potassium 3 7 3 5 - 5 3 mmol/L    Chloride 104 96 - 108 mmol/L    CO2 32 21 - 32 mmol/L    ANION GAP 3 (L) 4 - 13 mmol/L    BUN 45 (H) 5 - 25 mg/dL    Creatinine 1 76 (H) 0 60 - 1 30 mg/dL    Glucose, Fasting 116 (H) 65 - 99 mg/dL    Calcium 8 8 8 3 - 10 1 mg/dL    AST 77 (H) 5 - 45 U/L     (H) 12 - 78 U/L    Alkaline Phosphatase 63 46 - 116 U/L    Total Protein 7 0 6 4 - 8 4 g/dL    Albumin 3 5 3 5 - 5 0 g/dL    Total Bilirubin 0 42 0 20 - 1 00 mg/dL    eGFR 28 ml/min/1 73sq m   CBC   Result Value Ref Range    WBC 13 02 (H) 4 31 - 10 16 Thousand/uL    RBC 4 65 3 81 - 5 12 Million/uL    Hemoglobin 13 4 11 5 - 15 4 g/dL    Hematocrit 43 0 34 8 - 46 1 %    MCV 93 82 - 98 fL    MCH 28 8 26 8 - 34 3 pg    MCHC 31 2 (L) 31 4 - 37 4 g/dL    RDW 15 8 (H) 11 6 - 15 1 %    Platelets 520 968 - 112 Thousands/uL    MPV 11 8 8 9 - 12 7 fL   Magnesium   Result Value Ref Range    Magnesium 2 7 (H) 1 6 - 2 6 mg/dL   Phosphorus   Result Value Ref Range    Phosphorus 3 7 2 3 - 4 1 mg/dL   Protein / creatinine ratio, urine   Result Value Ref Range    Creatinine, Ur 80 5 mg/dL    Protein Urine Random 118 mg/dL    Prot/Creat Ratio, Ur 1 47 (H) 0 00 - 0 10   PTH, intact   Result Value Ref Range     7 (H) 18 4 - 80 1 pg/mL       Results from last 7 days   Lab Units 02/09/23  0736   WBC Thousand/uL 13 02*   HEMOGLOBIN g/dL 13 4   HEMATOCRIT % 43 0   PLATELETS Thousands/uL 292   POTASSIUM mmol/L 3 7   CHLORIDE mmol/L 104   CO2 mmol/L 32   BUN mg/dL 45*   CREATININE mg/dL 1 76*   CALCIUM mg/dL 8 8   MAGNESIUM mg/dL 2 7*   PHOSPHORUS mg/dL 3 7         Radiology review:   chest X-ray    Ultrasound      Portions of the record may have been created with voice recognition software  Occasional wrong word or "sound a like" substitutions may have occurred due to the inherent limitations of voice recognition software  Read the chart carefully and recognize, using context, where substitutions have occurred

## 2023-02-07 ENCOUNTER — TELEPHONE (OUTPATIENT)
Dept: NEPHROLOGY | Facility: CLINIC | Age: 73
End: 2023-02-07

## 2023-02-07 NOTE — TELEPHONE ENCOUNTER
Spoke with patient reminding her to go for lab work and to bring BP readings to her appt on 2/13  She expressed understanding and thanked us for the call

## 2023-02-09 ENCOUNTER — APPOINTMENT (OUTPATIENT)
Dept: LAB | Facility: AMBULARY SURGERY CENTER | Age: 73
End: 2023-02-09

## 2023-02-09 DIAGNOSIS — D63.1 ANEMIA IN STAGE 4 CHRONIC KIDNEY DISEASE (HCC): ICD-10-CM

## 2023-02-09 DIAGNOSIS — N18.4 STAGE 4 CHRONIC KIDNEY DISEASE (HCC): ICD-10-CM

## 2023-02-09 DIAGNOSIS — N18.4 ANEMIA IN STAGE 4 CHRONIC KIDNEY DISEASE (HCC): ICD-10-CM

## 2023-02-09 DIAGNOSIS — I12.9 HYPERTENSIVE CHRONIC KIDNEY DISEASE WITH STAGE 1 THROUGH STAGE 4 CHRONIC KIDNEY DISEASE, OR UNSPECIFIED CHRONIC KIDNEY DISEASE: ICD-10-CM

## 2023-02-09 DIAGNOSIS — R80.1 PERSISTENT PROTEINURIA: ICD-10-CM

## 2023-02-09 PROBLEM — R94.5 LIVER FUNCTION ABNORMALITY: Status: ACTIVE | Noted: 2023-02-09

## 2023-02-09 LAB
ALBUMIN SERPL BCP-MCNC: 3.5 G/DL (ref 3.5–5)
ALP SERPL-CCNC: 63 U/L (ref 46–116)
ALT SERPL W P-5'-P-CCNC: 207 U/L (ref 12–78)
ANION GAP SERPL CALCULATED.3IONS-SCNC: 3 MMOL/L (ref 4–13)
AST SERPL W P-5'-P-CCNC: 77 U/L (ref 5–45)
BILIRUB SERPL-MCNC: 0.42 MG/DL (ref 0.2–1)
BUN SERPL-MCNC: 45 MG/DL (ref 5–25)
CALCIUM SERPL-MCNC: 8.8 MG/DL (ref 8.3–10.1)
CHLORIDE SERPL-SCNC: 104 MMOL/L (ref 96–108)
CO2 SERPL-SCNC: 32 MMOL/L (ref 21–32)
CREAT SERPL-MCNC: 1.76 MG/DL (ref 0.6–1.3)
CREAT UR-MCNC: 80.5 MG/DL
ERYTHROCYTE [DISTWIDTH] IN BLOOD BY AUTOMATED COUNT: 15.8 % (ref 11.6–15.1)
GFR SERPL CREATININE-BSD FRML MDRD: 28 ML/MIN/1.73SQ M
GLUCOSE P FAST SERPL-MCNC: 116 MG/DL (ref 65–99)
HCT VFR BLD AUTO: 43 % (ref 34.8–46.1)
HGB BLD-MCNC: 13.4 G/DL (ref 11.5–15.4)
MAGNESIUM SERPL-MCNC: 2.7 MG/DL (ref 1.6–2.6)
MCH RBC QN AUTO: 28.8 PG (ref 26.8–34.3)
MCHC RBC AUTO-ENTMCNC: 31.2 G/DL (ref 31.4–37.4)
MCV RBC AUTO: 93 FL (ref 82–98)
PHOSPHATE SERPL-MCNC: 3.7 MG/DL (ref 2.3–4.1)
PLATELET # BLD AUTO: 292 THOUSANDS/UL (ref 149–390)
PMV BLD AUTO: 11.8 FL (ref 8.9–12.7)
POTASSIUM SERPL-SCNC: 3.7 MMOL/L (ref 3.5–5.3)
PROT SERPL-MCNC: 7 G/DL (ref 6.4–8.4)
PROT UR-MCNC: 118 MG/DL
PROT/CREAT UR: 1.47 MG/G{CREAT} (ref 0–0.1)
PTH-INTACT SERPL-MCNC: 110.7 PG/ML (ref 18.4–80.1)
RBC # BLD AUTO: 4.65 MILLION/UL (ref 3.81–5.12)
SODIUM SERPL-SCNC: 139 MMOL/L (ref 135–147)
WBC # BLD AUTO: 13.02 THOUSAND/UL (ref 4.31–10.16)

## 2023-02-10 ENCOUNTER — TELEPHONE (OUTPATIENT)
Dept: NEPHROLOGY | Facility: CLINIC | Age: 73
End: 2023-02-10

## 2023-02-10 NOTE — TELEPHONE ENCOUNTER
Called patient and went over the following from Dr Bridget Miranda:    Very importantly liver function studies still elevated slightly  As long as she is feeling well, I would consider the following  1  Hold rosuvastatin as this may raise liver function studies  2  Right upper quadrant ultrasound regarding increased liver function studies now  3  Repeat a CMP in about 1 to 2 weeks  Patient verbally understood  Patient also has an appointment on Monday of next week

## 2023-02-10 NOTE — TELEPHONE ENCOUNTER
----- Message from Maria Dolores Sampson MD sent at 2/9/2023  2:57 PM EST -----  Very importantly liver function studies still elevated slightly  As long as she is feeling well, I would consider the following  1  Hold rosuvastatin as this may raise liver function studies  2  Right upper quadrant ultrasound regarding increased liver function studies now  3    Repeat a CMP in about 1 to 2 weeks

## 2023-02-13 ENCOUNTER — TELEPHONE (OUTPATIENT)
Dept: FAMILY MEDICINE CLINIC | Facility: CLINIC | Age: 73
End: 2023-02-13

## 2023-02-13 ENCOUNTER — OFFICE VISIT (OUTPATIENT)
Dept: NEPHROLOGY | Facility: CLINIC | Age: 73
End: 2023-02-13

## 2023-02-13 VITALS — BODY MASS INDEX: 30.36 KG/M2 | HEIGHT: 62 IN | WEIGHT: 165 LBS

## 2023-02-13 DIAGNOSIS — R30.0 DYSURIA: ICD-10-CM

## 2023-02-13 DIAGNOSIS — I12.9 HYPERTENSIVE CHRONIC KIDNEY DISEASE WITH STAGE 1 THROUGH STAGE 4 CHRONIC KIDNEY DISEASE, OR UNSPECIFIED CHRONIC KIDNEY DISEASE: Primary | ICD-10-CM

## 2023-02-13 DIAGNOSIS — D63.1 ANEMIA IN STAGE 4 CHRONIC KIDNEY DISEASE (HCC): ICD-10-CM

## 2023-02-13 DIAGNOSIS — N18.4 ANEMIA IN STAGE 4 CHRONIC KIDNEY DISEASE (HCC): ICD-10-CM

## 2023-02-13 DIAGNOSIS — I27.20 PULMONARY HYPERTENSION (HCC): ICD-10-CM

## 2023-02-13 DIAGNOSIS — R80.1 PERSISTENT PROTEINURIA: ICD-10-CM

## 2023-02-13 DIAGNOSIS — N18.4 STAGE 4 CHRONIC KIDNEY DISEASE (HCC): ICD-10-CM

## 2023-02-13 DIAGNOSIS — E78.5 DYSLIPIDEMIA: ICD-10-CM

## 2023-02-13 DIAGNOSIS — E11.21 DIABETIC NEPHROPATHY ASSOCIATED WITH TYPE 2 DIABETES MELLITUS (HCC): ICD-10-CM

## 2023-02-13 DIAGNOSIS — E55.9 VITAMIN D DEFICIENCY: ICD-10-CM

## 2023-02-13 DIAGNOSIS — N17.9 AKI (ACUTE KIDNEY INJURY) (HCC): ICD-10-CM

## 2023-02-13 DIAGNOSIS — R94.5 LIVER FUNCTION ABNORMALITY: ICD-10-CM

## 2023-02-13 RX ORDER — OLMESARTAN MEDOXOMIL 40 MG/1
40 TABLET ORAL
Qty: 90 TABLET | Refills: 3 | Status: SHIPPED | OUTPATIENT
Start: 2023-02-13

## 2023-02-13 NOTE — LETTER
February 13, 2023     Timoteo Padron MD  [address] [address]  [address]  [facility]    Patient: Elsi Salas   YOB: 1950   Date of Visit: 2/13/2023       Dear Dr Chester Alarcon: Thank you for referring Hartford Cowden to me for evaluation  Below are my notes for this consultation  If you have questions, please do not hesitate to call me  I look forward to following your patient along with you  Sincerely,        Cathryn Dowd MD        CC: DO Cathryn Gonsalez MD  2/13/2023  1:41 PM  Sign when Signing Visit  RENAL FOLLOW UP NOTE: td    • ASSESSMENT AND PLAN:  1   CKD stage 4 :  • Etiology:  Diabetic nephropathy/hypertensive nephrosclerosis/arteriolar nephrosclerosis/chronic NSAID use   No evidence of obstructive uropathy, renal artery disease or primary glomerular disease with a bland urinalysis  Of note, CPK was normal at 105 no evidence rhabdomyolysis    • Baseline creatinine: 1 5-2 0  • Current creatinine:  1 76 at baseline  • Urine protein creatinine ratio: 1 47 which is now above goal again  • UA demonstrated no significant hematuria but 3+ proteinuria from  • Serologies:  Normal C3/C4; negative rheumatoid factor; negative SPEP:  Negative UPEP;  Light chain ratio 2 09 essentially normal for chronic kidney disease  negative EWELINA; negative hepatitis-C; normal IgA; normal ASO; negative RPR  Recommendations:  • Treat hypertension-please see below  • Treat dyslipidemia-please see below  • Maintain proteinuria less than 1 g or as low as possible: Adjust antihypertensive regimen in order to try to get to proteinuria less than 1 g please see below  • Avoid nephrotoxic agents such as NSAIDs, patient counseled as such     2   Volume:   • Current status:  Euvolemic  • Torsemide dose:  Continue current dose 20 mg daily  3   Hypertension:  Workup:  • saline suppression test for primary aldosterone state was normal  • plasma free metanephrines was negative  • renal artery duplex was negative 02/2019       Current blood pressure averages:   A m :   146/78, no orthostatic changes  P m : 130/67, standing 117/67  Heart rate: Heart rate 50-60 range        • Goal blood pressure:  less PZNQ 460/22 given less than 1 g of proteinuria at this time  Recommendations:  • Push nonmedical regimen including weight loss, isotonic exercise and avoidance of salt; patient counseled as such  • Medication changes today:   • I will increase olmesartan to 40 mg at bedtime to help the morning readings as the evening readings are actually low normal, this will also help proteinuria  • To consider MRA in the future  4   Electrolytes:  all acceptable including a potassium of 3 7  5   Mineral bone disorder:  • Calcium/magnesium/phosphorus:  All acceptable, except for high normal magnesium avoid all magnesium products  • PTH intact:   110 7 just monitor for now  • Vitamin-D:  40 8  6   Dyslipidemia:  • Goal LDL:  Less than 100  • Current lipid profile:  LDL not able to calculate/HDL 33/triglycerides 416  Recommendations:  Per Endocrinology but essentially at goal  7   Anemia:   • Current hemoglobin 13 4 which is normal  8   Other problems:  • Depression  • Diabetes mellitus per primary medical physician  • Hypothyroidism  • BARBARA on CPAP  • Fibromyalgia/osteoarthritis:  Patient with myoclonic movements, seems related to gabapentin it was adjusted with resolution    • Nephrolithiasis  • Basal cell/squamous CA of the skin  • Urinary stress incontinence  • Status post incisional hernia repairs  • hospitalization as outlined below from severe GERD with hypoxemia and shortness of breath from a failed Nissen fundoplication from prior hiatal hernia repair  Nikki Romo is due to have further testing with an EGD by GI and then subsequently thoracic surgery consultation for possible repair(however, according to the patient at this juncture she was felt I risk so no surgery is planned at this time)  In addition, she was placed on Protonix 40 b i d  and Pepcid 40 mg hs  • Hospitalization on 07/14/2020 secondary confusion at home   Apparently she had protracted hospital course in [place] following aspiration pneumonia   Ten days in the ICU on a ventilator   No overt infectious process   Low probability for pulmonary embolus despite an elevated D-dimer   Had mild leukocytosis/SIRS criteria subsequently discharged 1 day later when she clinically improved   Symptoms were felt secondary to her protracted ICU course  •  hospitalization with discharge on 05/18/21:  • Epigastric pain following an EGD on 05/13/2021 for Botox injection at the pylorus for treatment for gastroparesis   Apparently associated with chocolate ingestion and possible cough with aspiration  • Complicated by ANDRA with creatinine to 2 6 which improved with IV fluids TO 1 92 ON 05/18/2021  •  Hospitalization 04/13/2022: With hypotension fatigue noted to have a low blood pressure mild increasing creatinine, dysuria treated with Bactrim but had an allergic reaction to it as an outpatient she received intravenous ceftriaxone and she was discharged on doxycycline  • Recently placed on spironolactone for blood pressure all medications were held decide from metoprolol  Amlodipine was resumed, torsemide re-initiated, but spironolactone was stopped  Maintain off hydralazine possibly add olmesartan  • Creatinine initially as high as 2 27 reduce down to 2 16 upon discharge  · Patient admitted 1/7/2023 secondary headache and confusion left temporal symptoms and temporal artery tenderness with an elevated ESR started empirically on steroids per neurology recommendation status post temporal artery biopsy  However she developed a vesicular rash felt to have herpes zoster ophthalmologists started on Valtrex seen by ophthalmology eyedrops also initiated  Patient discharged on Valtrex and high-dose gabapentin with follow-up with her primary care physician    · Patient with leukocytosis most compatible with steroids  · Patient with elevated liver function studies: I would hold rosuvastatin, check right upper quadrant ultrasound and may require GI consultation  ? Fatty liver        GI health maintenance:  Last colonoscopy: 7/30/2021       PATIENT INSTRUCTIONS:    Patient Instructions   1  Medication changes today:  • Continue to hold simvastatin for now as we monitor your liver tests  • Please increase olmesartan/Benicar to a total of 40 mg at bedtime  I did send your pharmacy a 40 mg tablet prescription; however until that time you can use either 2-20 mg pills or 120 mg pill and to the 10 mg pills for a total of 40 mg  Please go for a urine test now to make sure you do not have a urine infection    2  Please go for non fasting  lab work 1-2 weeks after making the above medication change  3   Please go for an ultrasound of your liver we will help to facilitate    Depending upon your liver tests, we may refer you to a liver/gastroenterology specialist/Dr Lachelle Diaz     4  Please contact your primary medical physician to follow-up with the several medical symptoms that you are experiencing I did send her a message as well    5    Please take 1 week a blood pressure readings 3 to 4 weeks after making the above medication adjustments    AS FOLLOWS  MORNING AND EVENING, SITTING AND STANDING as follows:  · TAKE THE MORNING READINGS BEFORE ANY MEDICATIONS AND WHEN YOU ARE RELAXED FOR SEVERAL MINUTES  · TAKE THE EVENING READINGS:  BETWEEN 7-10 P M ; PRIOR TO ANY MEDICATIONS; AT LEAST IN OUR  FROM DINNER; AND CERTAINLY AFTER RELAXING FOR A FEW MINUTES  · PLEASE INCLUDE HEART RATE WITH YOUR BLOOD PRESSURE READINGS  · When taking standing readings, keep your arm supported at heart level and not dangling  · Make sure you are sitting with your back supported and feet on the ground and do not cross your legs or feet  · Make sure you have not taken any coffee or caffeine products or exercised or smoke cigarettes at least 30 minutes before taking your blood pressure  Then please mail these readings into the office    6  Follow-up in 4 months  • Please bring in 1 week a blood pressure readings morning evening, sitting and standing is outlined above  • PLEASE BRING AN YOUR BLOOD PRESSURE MACHINE TO CORRELATE WITH THE OFFICE MACHINE AT THIS NEXT SCHEDULED VISIT  • Please go for fasting lab work 1-2 weeks prior to your appointment      7  General instructions:  • AVOID SALT BUT NOT ADDING AN READING LABELS TO MAKE SURE THERE IS LOW-SALT IN THE FOOD THAT YOU ARE EATING  o Goal is less than 2 g of sodium intake or less than 5 g of sodium chloride intake per day    • Avoid nonsteroidal anti-inflammatory drugs such as Naprosyn, ibuprofen, Aleve, Advil, Celebrex, Meloxicam (Mobic) etc   You can use Tylenol as needed if you do not have any liver condition to be concerned about    • Avoid medications such as Sudafed or decongestants and antihistamines that contained the D component which is the decongestant  You can take antihistamines without the decongestant or D component  • Try to avoid medications such as pantoprazole or  Protonix/Nexium or Esomeprazole)/Prilosec or omeprazole/Prevacid or lansoprazole/AcipHex or Rabeprazole  If you are able to, use Pepcid as this is safer for your kidneys  • Try to exercise at least 30 minutes 3 days a week to begin with with an ultimate goal of 5 days a week for at least 30 minutes    • Try to lose 5-10 lb by your next visit    • Please do not drink more than 2 glasses of alcohol/wine on a daily basis as this may contribute to your high blood pressure  Subjective:    The patient had H  zoster about January 4 of her face improved significantly  She had been treated off medications    No fevers, chills, she has minor nasal congestion and slight cough  Good appetite and poor energy (over the last couple of months)  Episode of incontinence the other night, usually stress incontinence, some urinary frequency and some burning, no blood in the urine  She is scheduled to see urology in the next few weeks    GERD type symptoms with gas no nausea vomiting or diarrhea  No cardiovascular symptoms including swelling of the legs  No headaches, dizziness or lightheadedness, but some unsteadiness  Blood pressure medications:  • Torsemide 20 mg daily in the morning  • Rosuvastatin had just been stopped a few days ago due to increased liver function studies  • Olmesartan is 20 mg in the morning and 10 mg the evening  • Lopressor 50 mg twice a day  • Amlodipine 5 mg at dinnertime      ROS:  See HPI, otherwise review of systems as completely reviewed with the patient are negative    Past Medical History:   Diagnosis Date   • Allergic    • Allergic rhinitis    • Anesthesia     pt reports "had to use double lumen endo tube for hiatal hernia repair/so surgeon could get to where he needed to work"   • Arthritis    • Aspiration into airway    • Basal cell carcinoma 2007    left cheek    • BCC (basal cell carcinoma) 05/27/2021    Left Nasal tip   • Cancer ([address] )     squamous cell cancer on forhead   • Cancer ([address] )     basal cell on nose    • Chronic kidney disease 2000, 2018    Stones, kidney disease stage 4   • Chronic pain disorder     bilat feet and joint pain on occas   • Colon polyp    • Constipation    • COPD (chronic obstructive pulmonary disease) ([address] )    • COVID-19 08/2021    recovered at home/did receive monoclonal infusion   • Dental bridge present    • Depression    • Diabetes mellitus ([address] )     Type 2   • Diabetic neuropathy [address] [address] )    • Disease of thyroid gland    • Family history of thyroid problem    • Fatty liver    • GERD (gastroesophageal reflux disease)    • Heart burn    • Hiatal hernia    • History of pneumonia    • Hyperlipidemia    • Hyperplasia, parathyroid ([address] )    • Hypertension    • Kidney problem    • Kidney stone    • Memory loss Julu2 2020   • Motion sickness    • Motion sickness    • Neck pain    • Obesity 1978   • Obesity (BMI 30 0-34  9)    • Pollen allergies    • RLS (restless legs syndrome)    • SCC (squamous cell carcinoma) 05/04/2021    left mid forehead   • Seasonal allergies    • Sleep apnea     has inspire   • Squamous cell skin cancer 2007    left cheek    • Swollen ankles    • 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, left     great toe fusion   • Urinary incontinence    • Urinary tract infection 03/28/2022   • Wears glasses      Past Surgical History:   Procedure Laterality Date   • ARTHROSCOPY KNEE     • BREAST BIOPSY      stereotactic-benign   • BREAST BIOPSY      stereo-benign   • BREAST EXCISIONAL BIOPSY      unknown date-benign   • BREAST EXCISIONAL BIOPSY      unknown date-benign   • BREAST EXCISIONAL BIOPSY      unknown date-benign   • BREAST EXCISIONAL BIOPSY      unknown age-benign   • CARDIAC CATHETERIZATION     • CHOLECYSTECTOMY     • COLONOSCOPY     • EXAMINATION UNDER ANESTHESIA N/A 06/24/2021    Procedure: EXAM UNDER ANESTHESIA (EUA), DISE;  Surgeon: Juve Hart MD;  [facility]: [facility];  Service: ENT   • HERNIA REPAIR     • HIATAL HERNIA REPAIR     • KNEE SURGERY      Torn maniscus lap surg   • LIPOSUCTION     • LYMPH NODE BIOPSY     • MOHS SURGERY  05/20/2021    left mid forehead-Gautam   • MOHS SURGERY Left 05/27/2021    Left nasal tip- gautam    • AZ LIGATION/BIOPSY TEMPORAL ARTERY Left 1/5/2023    Procedure: BIOPSY ARTERY TEMPORAL;  Surgeon: Grisel Bejarano DO;  [facility];  Service: Vascular   • AZ OPEN IMPLANTATION CRANIAL NERVE VASQUEZ & PULSE GEN N/A 11/10/2021    Procedure: INSERTION UPPER AIRWAY STIMULATOR, INSPIRE IMPLANT;  Surgeon: Juve Hart MD;  [facility]: [facility];  Service: ENT   • AZ UNLISTED PROCEDURE FOOT/TOES Right 07/19/2022    Procedure: 1st metatarsal phalangeal joint fusion;  Surgeon: Marina Mercado DPM;  [facility];  Service: Podiatry   • REDUCTION MAMMAPLASTY Bilateral 2000   • REDUCTION MAMMAPLASTY     • SKIN BIOPSY     • SKIN CANCER EXCISION  2007    squamous cell carcinoma    • SKIN CANCER EXCISION  2007    basal cell carcinoma   • SQUAMOUS CELL CARCINOMA EXCISION     • TOE SURGERY Right 08/04/2022    Right Great Toe Fusion   • TONSILLECTOMY     • TUBAL LIGATION     • UPPER GASTROINTESTINAL ENDOSCOPY     • US GUIDED BREAST BIOPSY RIGHT COMPLETE Right 07/18/2022     Family History   Problem Relation Age of Onset   • Heart disease Mother    • Depression Mother    • Hypertension Mother    • COPD Mother    • Hearing loss Mother    • Anxiety disorder Mother    • Heart disease Father    • Lung cancer Father 79        Smoker    • Cancer Father         brain   • Alcohol abuse Father    • Dementia Father    • Hypertension Father    • Thyroid disease Father    • COPD Father    • Arthritis Father    • Brain cancer Father 76   • Hypertension Sister    • Diabetes Sister    • Heart disease Sister    • Thyroid disease Sister    • Cancer Sister         Lympoma, lung   • Lung cancer Sister 78   • Anxiety disorder Sister    • Hypertension Brother    • Diabetes Brother    • Cancer Brother         Throat   • Dementia Brother    • Stroke Brother    • Hypertension Brother    • Heart disease Brother    • Diabetes Brother    • No Known Problems Son    • No Known Problems Son    • Brain cancer Paternal Aunt         unknown age   • Breast cancer Neg Hx       reports that she quit smoking about 47 years ago  Her smoking use included cigarettes  She started smoking about 55 years ago  She has a 20 00 pack-year smoking history  She has never used smokeless tobacco  She reports current alcohol use  She reports that she does not use drugs      I COMPLETELY REVIEWED THE PAST MEDICAL HISTORY/PAST SURGICAL HISTORY/SOCIAL HISTORY/FAMILY HISTORY/AND MEDICATIONS  AND UPDATED ALL    Objective:     Vitals:   BP sitting on right: 140/78 with a heart rate of 60 and regular  BP standing on right: 138/80 with a heart rate of 64 and right    Weight (last 2 days)     Date/Time Weight    02/13/23 1304 74 8 (165)        Wt Readings from Last 3 Encounters:   02/13/23 74 8 kg (165 lb)   01/30/23 76 2 kg (168 lb)   01/12/23 74 8 kg (165 lb)       Body mass index is 30 18 kg/m²      Physical Exam: General:  No acute distress/obese  Skin:  No acute rash  Eyes:  No scleral icterus, noninjected, no discharge from eyes  ENT:  Moist mucous membranes  Neck:  Supple, no jugular venous distention, trachea is midline, no lymphadenopathy and no thyromegaly  Back   No CVAT  Chest:  Clear to auscultation and percussion, good respiratory effort  CVS:  Regular rate and rhythm without a rub, or gallops or murmurs  Abdomen:  Obese,Soft and nontender with normal bowel sounds  Extremities:  No cyanosis and no edema, no arthritic changes, normal range of motion  Neuro:  Grossly intact  Psych:  Alert, oriented x3 and appropriate      Medications:    Current Outpatient Medications:   •  acetaminophen (TYLENOL) 325 mg tablet, Take 2 tablets (650 mg total) by mouth every 6 (six) hours as needed for mild pain, headaches or fever, Disp: , Rfl: 0  •  albuterol (Ventolin HFA) 90 mcg/act inhaler, Inhale 2 puffs every 6 (six) hours as needed for wheezing, Disp: 54 g, Rfl: 0  •  amLODIPine (NORVASC) 5 mg tablet, Take 1 tablet (5 mg total) by mouth daily, Disp: 90 tablet, Rfl: 0  •  b complex vitamins capsule, Take 1 capsule by mouth daily, Disp: , Rfl:   •  B-D ULTRAFINE III SHORT PEN 31G X 8 MM MISC, USE AS DIRECTED 4 TIMES PER DAY, Disp: , Rfl: 3  •  Calcium Citrate 1040 MG TABS, Take 500 mg by mouth Three times a day, Disp: , Rfl:   •  DULoxetine (CYMBALTA) 30 mg delayed release capsule, Take 60 mg by mouth daily, Disp: , Rfl:   •  gabapentin (Neurontin) 300 mg capsule, Take one cap PO in the morning and afternoon and two caps PO in the evening, Disp: 120 capsule, Rfl: 0  •  insulin aspart (NovoLOG) 100 units/mL injection, Inject under the skin 3 (three) times a day before meals 16 units, 16 units, 25 units  , Disp: , Rfl:   •  insulin detemir (Levemir FlexTouch) 100 Units/mL injection pen, Inject 50 Units under the skin every evening , Disp: , Rfl:   •  levothyroxine 112 mcg tablet, Take 112 mcg by mouth every morning, Disp: , Rfl:   •  metoprolol tartrate (LOPRESSOR) 50 mg tablet, TAKE 1 TABLET (50 MG TOTAL) BY MOUTH EVERY 12 (TWELVE) HOURS, Disp: 180 tablet, Rfl: 3  •  olmesartan (BENICAR) 40 mg tablet, Take 1 tablet (40 mg total) by mouth daily at bedtime, Disp: 90 tablet, Rfl: 3  •  pantoprazole (PROTONIX) 40 mg tablet, TAKE 1 TABLET BY MOUTH TWICE A DAY, Disp: 180 tablet, Rfl: 2  •  pramipexole (MIRAPEX) 0 25 mg tablet, Take 1 tablet (0 25 mg total) by mouth daily at bedtime, Disp: 90 tablet, Rfl: 0  •  Probiotic Product (PROBIOTIC PO), Take 1 capsule by mouth 2 (two) times a day, Disp: , Rfl:   •  torsemide (DEMADEX) 20 mg tablet, TAKE 1 TABLET BY MOUTH EVERY DAY, Disp: 90 tablet, Rfl: 3  •  Vitamin D, Ergocalciferol, 2000 units CAPS, Take 2,000 Units by mouth daily , Disp: , Rfl:   •  rosuvastatin (CRESTOR) 20 MG tablet, Take 1 tablet (20 mg total) by mouth daily (Patient not taking: Reported on 2/13/2023), Disp: 30 tablet, Rfl: 0    Lab, Imaging and other studies: I have personally reviewed pertinent labs    Laboratory Results:  Results for orders placed or performed in visit on 02/09/23   Comprehensive metabolic panel   Result Value Ref Range    Sodium 139 135 - 147 mmol/L    Potassium 3 7 3 5 - 5 3 mmol/L    Chloride 104 96 - 108 mmol/L    CO2 32 21 - 32 mmol/L    ANION GAP 3 (L) 4 - 13 mmol/L    BUN 45 (H) 5 - 25 mg/dL    Creatinine 1 76 (H) 0 60 - 1 30 mg/dL    Glucose, Fasting 116 (H) 65 - 99 mg/dL    Calcium 8 8 8 3 - 10 1 mg/dL    AST 77 (H) 5 - 45 U/L     (H) 12 - 78 U/L    Alkaline Phosphatase 63 46 - 116 U/L    Total Protein 7 0 6 4 - 8 4 g/dL    Albumin 3 5 3 5 - 5 0 g/dL    Total Bilirubin 0 42 0 20 - 1 00 mg/dL    eGFR 28 ml/min/1 73sq m   CBC   Result Value Ref Range    WBC 13 02 (H) 4 31 - 10 16 Thousand/uL    RBC 4 65 3 81 - 5 12 Million/uL    Hemoglobin 13 4 11 5 - 15 4 g/dL    Hematocrit 43 0 34 8 - 46 1 %    MCV 93 82 - 98 fL    MCH 28 8 26 8 - 34 3 pg    MCHC 31 2 (L) 31 4 - 37 4 g/dL    RDW 15 8 (H) 11 6 - 15 1 %    Platelets 641 708 - 669 Thousands/uL    MPV 11 8 8 9 - 12 7 fL   Magnesium   Result Value Ref Range    Magnesium 2 7 (H) 1 6 - 2 6 mg/dL   Phosphorus   Result Value Ref Range    Phosphorus 3 7 2 3 - 4 1 mg/dL   Protein / creatinine ratio, urine   Result Value Ref Range    Creatinine, Ur 80 5 mg/dL    Protein Urine Random 118 mg/dL    Prot/Creat Ratio, Ur 1 47 (H) 0 00 - 0 10   PTH, intact   Result Value Ref Range     7 (H) 18 4 - 80 1 pg/mL       Results from last 7 days   Lab Units 02/09/23  0736   WBC Thousand/uL 13 02*   HEMOGLOBIN g/dL 13 4   HEMATOCRIT % 43 0   PLATELETS Thousands/uL 292   POTASSIUM mmol/L 3 7   CHLORIDE mmol/L 104   CO2 mmol/L 32   BUN mg/dL 45*   CREATININE mg/dL 1 76*   CALCIUM mg/dL 8 8   MAGNESIUM mg/dL 2 7*   PHOSPHORUS mg/dL 3 7         Radiology review:   chest X-ray    Ultrasound      Portions of the record may have been created with voice recognition software  Occasional wrong word or "sound a like" substitutions may have occurred due to the inherent limitations of voice recognition software  Read the chart carefully and recognize, using context, where substitutions have occurred

## 2023-02-13 NOTE — PATIENT INSTRUCTIONS
1  Medication changes today:  Continue to hold simvastatin for now as we monitor your liver tests  Please increase olmesartan/Benicar to a total of 40 mg at bedtime  I did send your pharmacy a 40 mg tablet prescription; however until that time you can use either 2-20 mg pills or 120 mg pill and to the 10 mg pills for a total of 40 mg  Please go for a urine test now to make sure you do not have a urine infection    2  Please go for non fasting  lab work 1-2 weeks after making the above medication change  3   Please go for an ultrasound of your liver we will help to facilitate    Depending upon your liver tests, we may refer you to a liver/gastroenterology specialist/Dr Marcello Ang     4  Please contact your primary medical physician to follow-up with the several medical symptoms that you are experiencing I did send her a message as well    5  Please take 1 week a blood pressure readings 3 to 4 weeks after making the above medication adjustments    AS FOLLOWS  MORNING AND EVENING, SITTING AND STANDING as follows:  TAKE THE MORNING READINGS BEFORE ANY MEDICATIONS AND WHEN YOU ARE RELAXED FOR SEVERAL MINUTES  TAKE THE EVENING READINGS:  BETWEEN 7-10 P M ; PRIOR TO ANY MEDICATIONS; AT LEAST IN OUR  FROM DINNER; AND CERTAINLY AFTER RELAXING FOR A FEW MINUTES  PLEASE INCLUDE HEART RATE WITH YOUR BLOOD PRESSURE READINGS  When taking standing readings, keep your arm supported at heart level and not dangling  Make sure you are sitting with your back supported and feet on the ground and do not cross your legs or feet  Make sure you have not taken any coffee or caffeine products or exercised or smoke cigarettes at least 30 minutes before taking your blood pressure  Then please mail these readings into the office    6    Follow-up in 4 months  Please bring in 1 week a blood pressure readings morning evening, sitting and standing is outlined above  PLEASE BRING AN YOUR BLOOD PRESSURE MACHINE TO CORRELATE WITH THE OFFICE MACHINE AT THIS NEXT SCHEDULED VISIT  Please go for fasting lab work 1-2 weeks prior to your appointment      7  General instructions:  AVOID SALT BUT NOT ADDING AN READING LABELS TO MAKE SURE THERE IS LOW-SALT IN THE FOOD THAT YOU ARE EATING  Goal is less than 2 g of sodium intake or less than 5 g of sodium chloride intake per day    Avoid nonsteroidal anti-inflammatory drugs such as Naprosyn, ibuprofen, Aleve, Advil, Celebrex, Meloxicam (Mobic) etc   You can use Tylenol as needed if you do not have any liver condition to be concerned about    Avoid medications such as Sudafed or decongestants and antihistamines that contained the D component which is the decongestant  You can take antihistamines without the decongestant or D component  Try to avoid medications such as pantoprazole or  Protonix/Nexium or Esomeprazole)/Prilosec or omeprazole/Prevacid or lansoprazole/AcipHex or Rabeprazole  If you are able to, use Pepcid as this is safer for your kidneys  Try to exercise at least 30 minutes 3 days a week to begin with with an ultimate goal of 5 days a week for at least 30 minutes    Try to lose 5-10 lb by your next visit    Please do not drink more than 2 glasses of alcohol/wine on a daily basis as this may contribute to your high blood pressure

## 2023-02-14 ENCOUNTER — APPOINTMENT (OUTPATIENT)
Dept: LAB | Facility: AMBULARY SURGERY CENTER | Age: 73
End: 2023-02-14

## 2023-02-14 DIAGNOSIS — D63.1 ANEMIA IN STAGE 4 CHRONIC KIDNEY DISEASE (HCC): ICD-10-CM

## 2023-02-14 DIAGNOSIS — I12.9 HYPERTENSIVE CHRONIC KIDNEY DISEASE WITH STAGE 1 THROUGH STAGE 4 CHRONIC KIDNEY DISEASE, OR UNSPECIFIED CHRONIC KIDNEY DISEASE: ICD-10-CM

## 2023-02-14 DIAGNOSIS — E55.9 VITAMIN D DEFICIENCY: ICD-10-CM

## 2023-02-14 DIAGNOSIS — I10 ESSENTIAL HYPERTENSION: ICD-10-CM

## 2023-02-14 DIAGNOSIS — R94.5 LIVER FUNCTION ABNORMALITY: ICD-10-CM

## 2023-02-14 DIAGNOSIS — I27.20 PULMONARY HYPERTENSION (HCC): ICD-10-CM

## 2023-02-14 DIAGNOSIS — N18.30 CHRONIC KIDNEY DISEASE, STAGE III (MODERATE) (HCC): ICD-10-CM

## 2023-02-14 DIAGNOSIS — R80.1 PERSISTENT PROTEINURIA: ICD-10-CM

## 2023-02-14 DIAGNOSIS — N18.4 ANEMIA IN STAGE 4 CHRONIC KIDNEY DISEASE (HCC): ICD-10-CM

## 2023-02-14 DIAGNOSIS — N17.9 AKI (ACUTE KIDNEY INJURY) (HCC): ICD-10-CM

## 2023-02-14 DIAGNOSIS — E11.21 DIABETIC NEPHROPATHY ASSOCIATED WITH TYPE 2 DIABETES MELLITUS (HCC): ICD-10-CM

## 2023-02-14 DIAGNOSIS — G25.81 RLS (RESTLESS LEGS SYNDROME): ICD-10-CM

## 2023-02-14 DIAGNOSIS — E78.5 DYSLIPIDEMIA: ICD-10-CM

## 2023-02-14 DIAGNOSIS — N18.4 STAGE 4 CHRONIC KIDNEY DISEASE (HCC): ICD-10-CM

## 2023-02-14 LAB
ALBUMIN SERPL BCP-MCNC: 3.4 G/DL (ref 3.5–5)
ALP SERPL-CCNC: 60 U/L (ref 46–116)
ALT SERPL W P-5'-P-CCNC: 177 U/L (ref 12–78)
ANION GAP SERPL CALCULATED.3IONS-SCNC: 0 MMOL/L (ref 4–13)
AST SERPL W P-5'-P-CCNC: 72 U/L (ref 5–45)
BACTERIA UR QL AUTO: ABNORMAL /HPF
BILIRUB SERPL-MCNC: 0.4 MG/DL (ref 0.2–1)
BILIRUB UR QL STRIP: NEGATIVE
BUN SERPL-MCNC: 44 MG/DL (ref 5–25)
CALCIUM ALBUM COR SERPL-MCNC: 10.1 MG/DL (ref 8.3–10.1)
CALCIUM SERPL-MCNC: 9.6 MG/DL (ref 8.3–10.1)
CHLORIDE SERPL-SCNC: 106 MMOL/L (ref 96–108)
CLARITY UR: CLEAR
CO2 SERPL-SCNC: 34 MMOL/L (ref 21–32)
COLOR UR: ABNORMAL
CREAT SERPL-MCNC: 1.81 MG/DL (ref 0.6–1.3)
GFR SERPL CREATININE-BSD FRML MDRD: 27 ML/MIN/1.73SQ M
GLUCOSE P FAST SERPL-MCNC: 109 MG/DL (ref 65–99)
GLUCOSE UR STRIP-MCNC: NEGATIVE MG/DL
HGB UR QL STRIP.AUTO: NEGATIVE
KETONES UR STRIP-MCNC: NEGATIVE MG/DL
LEUKOCYTE ESTERASE UR QL STRIP: ABNORMAL
MUCOUS THREADS UR QL AUTO: ABNORMAL
NITRITE UR QL STRIP: NEGATIVE
NON-SQ EPI CELLS URNS QL MICRO: ABNORMAL /HPF
PH UR STRIP.AUTO: 6.5 [PH]
POTASSIUM SERPL-SCNC: 4.2 MMOL/L (ref 3.5–5.3)
PROT SERPL-MCNC: 6.7 G/DL (ref 6.4–8.4)
PROT UR STRIP-MCNC: ABNORMAL MG/DL
RBC #/AREA URNS AUTO: ABNORMAL /HPF
SODIUM SERPL-SCNC: 140 MMOL/L (ref 135–147)
SP GR UR STRIP.AUTO: 1.01 (ref 1–1.03)
UROBILINOGEN UR STRIP-ACNC: <2 MG/DL
WBC #/AREA URNS AUTO: ABNORMAL /HPF

## 2023-02-14 RX ORDER — AMLODIPINE BESYLATE 5 MG/1
5 TABLET ORAL DAILY
Qty: 90 TABLET | Refills: 0 | Status: SHIPPED | OUTPATIENT
Start: 2023-02-14

## 2023-02-15 RX ORDER — PRAMIPEXOLE DIHYDROCHLORIDE 0.25 MG/1
0.25 TABLET ORAL
Qty: 90 TABLET | Refills: 2 | Status: SHIPPED | OUTPATIENT
Start: 2023-02-15

## 2023-02-17 ENCOUNTER — TELEPHONE (OUTPATIENT)
Dept: NEPHROLOGY | Facility: CLINIC | Age: 73
End: 2023-02-17

## 2023-02-17 DIAGNOSIS — R80.1 PERSISTENT PROTEINURIA: ICD-10-CM

## 2023-02-17 DIAGNOSIS — R94.5 LIVER FUNCTION ABNORMALITY: ICD-10-CM

## 2023-02-17 DIAGNOSIS — N18.4 STAGE 4 CHRONIC KIDNEY DISEASE (HCC): Primary | ICD-10-CM

## 2023-02-17 LAB — BACTERIA UR CULT: ABNORMAL

## 2023-02-17 NOTE — TELEPHONE ENCOUNTER
----- Message from Alivia Harmon MD sent at 2/14/2023  1:03 PM EST -----  Couple of things  1  Repeat a CMP and SPEP UPEP and light chain ratio in about 1-2 weeks  2  I believe she is scheduled for right upper quadrant ultrasound  3    I would have her formally see GI as a consultation for increased liver function studies, I did speak with her about this yesterday but just let her know that I would definitely have her set this up at this time    Thank you  ----- Message -----  From: Lab, Background User  Sent: 2/14/2023  11:43 AM EST  To: Alivia Harmon MD

## 2023-02-20 RX ORDER — NITROFURANTOIN 25; 75 MG/1; MG/1
100 CAPSULE ORAL 2 TIMES DAILY
Qty: 10 CAPSULE | Refills: 0 | Status: SHIPPED | OUTPATIENT
Start: 2023-02-20 | End: 2023-02-25

## 2023-02-23 ENCOUNTER — APPOINTMENT (OUTPATIENT)
Dept: LAB | Facility: AMBULARY SURGERY CENTER | Age: 73
End: 2023-02-23

## 2023-02-23 ENCOUNTER — HOSPITAL ENCOUNTER (OUTPATIENT)
Dept: ULTRASOUND IMAGING | Facility: HOSPITAL | Age: 73
Discharge: HOME | End: 2023-02-23
Attending: INTERNAL MEDICINE

## 2023-02-23 ENCOUNTER — TELEPHONE (OUTPATIENT)
Dept: NEPHROLOGY | Facility: CLINIC | Age: 73
End: 2023-02-23

## 2023-02-23 DIAGNOSIS — R94.5 LIVER FUNCTION ABNORMALITY: ICD-10-CM

## 2023-02-23 DIAGNOSIS — N18.4 STAGE 4 CHRONIC KIDNEY DISEASE (HCC): ICD-10-CM

## 2023-02-23 DIAGNOSIS — R80.1 PERSISTENT PROTEINURIA: ICD-10-CM

## 2023-02-23 LAB
ALBUMIN SERPL BCP-MCNC: 3.5 G/DL (ref 3.5–5)
ALP SERPL-CCNC: 61 U/L (ref 46–116)
ALT SERPL W P-5'-P-CCNC: 93 U/L (ref 12–78)
ANION GAP SERPL CALCULATED.3IONS-SCNC: 2 MMOL/L (ref 4–13)
AST SERPL W P-5'-P-CCNC: 31 U/L (ref 5–45)
BILIRUB SERPL-MCNC: 0.34 MG/DL (ref 0.2–1)
BUN SERPL-MCNC: 43 MG/DL (ref 5–25)
CALCIUM SERPL-MCNC: 9.2 MG/DL (ref 8.3–10.1)
CHLORIDE SERPL-SCNC: 104 MMOL/L (ref 96–108)
CO2 SERPL-SCNC: 31 MMOL/L (ref 21–32)
CREAT SERPL-MCNC: 1.73 MG/DL (ref 0.6–1.3)
GFR SERPL CREATININE-BSD FRML MDRD: 28 ML/MIN/1.73SQ M
GLUCOSE SERPL-MCNC: 144 MG/DL (ref 65–140)
POTASSIUM SERPL-SCNC: 3.7 MMOL/L (ref 3.5–5.3)
PROT SERPL-MCNC: 6.7 G/DL (ref 6.4–8.4)
SODIUM SERPL-SCNC: 137 MMOL/L (ref 135–147)

## 2023-02-23 NOTE — TELEPHONE ENCOUNTER
Left voicemail for the patient relaying the message above  Advised patient to call back with any questions or concerns

## 2023-02-23 NOTE — TELEPHONE ENCOUNTER
----- Message from Alexandra Hess MD sent at 2/23/2023  3:29 PM EST -----  Please let the patient know her labs look better  Her liver test is improved  Dr Alyssa Maciel is going to reach out to make sure she gets follow-up and follows the slightly elevated liver test  ----- Message -----  From: Lab, Background User  Sent: 2/23/2023   3:24 PM EST  To: Alexandra Hess MD

## 2023-02-24 LAB
KAPPA LC FREE SER-MCNC: 48.5 MG/L (ref 3.3–19.4)
KAPPA LC FREE/LAMBDA FREE SER: 1.47 {RATIO} (ref 0.26–1.65)
LAMBDA LC FREE SERPL-MCNC: 33.1 MG/L (ref 5.7–26.3)

## 2023-02-25 LAB
ALBUMIN SERPL ELPH-MCNC: 3.79 G/DL (ref 3.5–5)
ALBUMIN SERPL ELPH-MCNC: 59.2 % (ref 52–65)
ALPHA1 GLOB SERPL ELPH-MCNC: 0.27 G/DL (ref 0.1–0.4)
ALPHA1 GLOB SERPL ELPH-MCNC: 4.2 % (ref 2.5–5)
ALPHA2 GLOB SERPL ELPH-MCNC: 0.88 G/DL (ref 0.4–1.2)
ALPHA2 GLOB SERPL ELPH-MCNC: 13.7 % (ref 7–13)
BETA GLOB ABNORMAL SERPL ELPH-MCNC: 0.49 G/DL (ref 0.4–0.8)
BETA1 GLOB SERPL ELPH-MCNC: 7.7 % (ref 5–13)
BETA2 GLOB SERPL ELPH-MCNC: 5.2 % (ref 2–8)
BETA2+GAMMA GLOB SERPL ELPH-MCNC: 0.33 G/DL (ref 0.2–0.5)
GAMMA GLOB ABNORMAL SERPL ELPH-MCNC: 0.64 G/DL (ref 0.5–1.6)
GAMMA GLOB SERPL ELPH-MCNC: 10 % (ref 12–22)
IGG/ALB SER: 1.45 {RATIO} (ref 1.1–1.8)
PROT PATTERN SERPL ELPH-IMP: ABNORMAL
PROT SERPL-MCNC: 6.4 G/DL (ref 6.4–8.2)

## 2023-02-28 LAB
ALBUMIN UR ELPH-MCNC: 87.3 %
ALPHA1 GLOB MFR UR ELPH: 2.9 %
ALPHA2 GLOB MFR UR ELPH: 2.2 %
B-GLOBULIN MFR UR ELPH: 5.6 %
GAMMA GLOB MFR UR ELPH: 2 %
INTERPRETATION UR IFE-IMP: NORMAL
PROT PATTERN UR ELPH-IMP: ABNORMAL
PROT UR-MCNC: 77 MG/DL

## 2023-03-05 PROBLEM — N39.0 UTI (URINARY TRACT INFECTION): Status: RESOLVED | Noted: 2022-04-14 | Resolved: 2023-03-05

## 2023-03-21 ENCOUNTER — DOCUMENTATION (OUTPATIENT)
Dept: NEPHROLOGY | Facility: CLINIC | Age: 73
End: 2023-03-21

## 2023-03-21 DIAGNOSIS — I10 PRIMARY HYPERTENSION: Primary | ICD-10-CM

## 2023-03-21 DIAGNOSIS — N18.4 STAGE 4 CHRONIC KIDNEY DISEASE (HCC): ICD-10-CM

## 2023-03-21 DIAGNOSIS — I12.9 HYPERTENSIVE CHRONIC KIDNEY DISEASE WITH STAGE 1 THROUGH STAGE 4 CHRONIC KIDNEY DISEASE, OR UNSPECIFIED CHRONIC KIDNEY DISEASE: ICD-10-CM

## 2023-03-21 RX ORDER — SPIRONOLACTONE 25 MG/1
25 TABLET ORAL DAILY
Qty: 90 TABLET | Refills: 3 | Status: SHIPPED | OUTPATIENT
Start: 2023-03-21 | End: 2023-03-30

## 2023-03-21 NOTE — TELEPHONE ENCOUNTER
Spoke with patient about the following, she expressed understanding and thanked us for the call:    Recommendations  - I would add spironolactone 25 mg at bedtime to help with morning readings     - Repeat a basic metabolic profile 1 to 2 weeks later nonfasting     - Repeat blood pressure readings 4 to 6 weeks after making the above medication changes

## 2023-03-21 NOTE — PROGRESS NOTES
Home blood pressure readings:  AM: 148/78, standing 136/75  PM: 137/76, standing 122/69  Heart rate 60 range    Recommendations  - I would add spironolactone 25 mg at bedtime to help with morning readings    - Repeat a basic metabolic profile 1 to 2 weeks later nonfasting    - Repeat blood pressure readings 4 to 6 weeks after making the above medication changes    Thank you

## 2023-03-22 ENCOUNTER — HOSPITAL ENCOUNTER (INPATIENT)
Facility: HOSPITAL | Age: 73
LOS: 1 days | Discharge: HOME | End: 2023-03-23
Attending: EMERGENCY MEDICINE | Admitting: SURGERY

## 2023-03-22 ENCOUNTER — APPOINTMENT (EMERGENCY)
Dept: RADIOLOGY | Facility: HOSPITAL | Age: 73
End: 2023-03-22

## 2023-03-22 ENCOUNTER — APPOINTMENT (EMERGENCY)
Dept: CT IMAGING | Facility: HOSPITAL | Age: 73
End: 2023-03-22

## 2023-03-22 ENCOUNTER — APPOINTMENT (INPATIENT)
Dept: CT IMAGING | Facility: HOSPITAL | Age: 73
End: 2023-03-22

## 2023-03-22 DIAGNOSIS — S09.90XA INJURY OF HEAD, INITIAL ENCOUNTER: ICD-10-CM

## 2023-03-22 DIAGNOSIS — S02.85XA RIGHT ORBITAL FRACTURE (HCC): ICD-10-CM

## 2023-03-22 DIAGNOSIS — S02.2XXA CLOSED FRACTURE OF NASAL BONE, INITIAL ENCOUNTER: ICD-10-CM

## 2023-03-22 DIAGNOSIS — S22.41XA MULTIPLE CLOSED FRACTURES OF RIBS OF RIGHT SIDE: ICD-10-CM
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
## 2023-03-22 DIAGNOSIS — S02.85XA RIGHT ORBIT FRACTURE, CLOSED, INITIAL ENCOUNTER (HCC): ICD-10-CM

## 2023-03-22 DIAGNOSIS — S02.2XXA NASAL BONE FRACTURE: Primary | ICD-10-CM

## 2023-03-22 DIAGNOSIS — S22.41XA CLOSED FRACTURE OF MULTIPLE RIBS OF RIGHT SIDE, INITIAL ENCOUNTER: ICD-10-CM

## 2023-03-22 PROBLEM — S00.81XA FACIAL ABRASION, INITIAL ENCOUNTER: Status: ACTIVE | Noted: 2023-03-22

## 2023-03-22 LAB
ABO GROUP BLD: NORMAL
ALBUMIN SERPL BCP-MCNC: 3.8 G/DL (ref 3.5–5)
ALP SERPL-CCNC: 57 U/L (ref 34–104)
ALT SERPL W P-5'-P-CCNC: 21 U/L (ref 7–52)
ANION GAP SERPL CALCULATED.3IONS-SCNC: 9 MMOL/L (ref 4–13)
AST SERPL W P-5'-P-CCNC: 18 U/L (ref 13–39)
BASOPHILS # BLD AUTO: 0.03 THOUSANDS/ÂΜL (ref 0–0.1)
BASOPHILS NFR BLD AUTO: 0 % (ref 0–1)
BILIRUB SERPL-MCNC: 0.44 MG/DL (ref 0.2–1)
BLD GP AB SCN SERPL QL: NEGATIVE
BUN SERPL-MCNC: 36 MG/DL (ref 5–25)
CALCIUM SERPL-MCNC: 9.7 MG/DL (ref 8.4–10.2)
CHLORIDE SERPL-SCNC: 98 MMOL/L (ref 96–108)
CO2 SERPL-SCNC: 32 MMOL/L (ref 21–32)
CREAT SERPL-MCNC: 1.93 MG/DL (ref 0.6–1.3)
EOSINOPHIL # BLD AUTO: 0.14 THOUSAND/ÂΜL (ref 0–0.61)
EOSINOPHIL NFR BLD AUTO: 1 % (ref 0–6)
ERYTHROCYTE [DISTWIDTH] IN BLOOD BY AUTOMATED COUNT: 14.6 % (ref 11.6–15.1)
GFR SERPL CREATININE-BSD FRML MDRD: 25 ML/MIN/1.73SQ M
GLUCOSE SERPL-MCNC: 153 MG/DL (ref 65–140)
GLUCOSE SERPL-MCNC: 251 MG/DL (ref 65–140)
HCT VFR BLD AUTO: 43.5 % (ref 34.8–46.1)
HGB BLD-MCNC: 14.1 G/DL (ref 11.5–15.4)
IMM GRANULOCYTES # BLD AUTO: 0.09 THOUSAND/UL (ref 0–0.2)
IMM GRANULOCYTES NFR BLD AUTO: 1 % (ref 0–2)
LYMPHOCYTES # BLD AUTO: 1.61 THOUSANDS/ÂΜL (ref 0.6–4.47)
LYMPHOCYTES NFR BLD AUTO: 12 % (ref 14–44)
MCH RBC QN AUTO: 29.3 PG (ref 26.8–34.3)
MCHC RBC AUTO-ENTMCNC: 32.4 G/DL (ref 31.4–37.4)
MCV RBC AUTO: 90 FL (ref 82–98)
MONOCYTES # BLD AUTO: 0.48 THOUSAND/ÂΜL (ref 0.17–1.22)
MONOCYTES NFR BLD AUTO: 4 % (ref 4–12)
NEUTROPHILS # BLD AUTO: 10.72 THOUSANDS/ÂΜL (ref 1.85–7.62)
NEUTS SEG NFR BLD AUTO: 82 % (ref 43–75)
NRBC BLD AUTO-RTO: 0 /100 WBCS
PLATELET # BLD AUTO: 254 THOUSANDS/UL (ref 149–390)
PLATELET # BLD AUTO: 272 THOUSANDS/UL (ref 149–390)
PMV BLD AUTO: 11.2 FL (ref 8.9–12.7)
PMV BLD AUTO: 11.3 FL (ref 8.9–12.7)
POTASSIUM SERPL-SCNC: 3.5 MMOL/L (ref 3.5–5.3)
PROT SERPL-MCNC: 6.6 G/DL (ref 6.4–8.4)
RBC # BLD AUTO: 4.82 MILLION/UL (ref 3.81–5.12)
RH BLD: POSITIVE
SODIUM SERPL-SCNC: 139 MMOL/L (ref 135–147)
SPECIMEN EXPIRATION DATE: NORMAL
WBC # BLD AUTO: 13.07 THOUSAND/UL (ref 4.31–10.16)

## 2023-03-22 RX ORDER — ENOXAPARIN SODIUM 100 MG/ML
30 INJECTION SUBCUTANEOUS EVERY 12 HOURS
Status: DISCONTINUED | OUTPATIENT
Start: 2023-03-22 | End: 2023-03-22

## 2023-03-22 RX ORDER — CALCIUM CARBONATE 500(1250)
2 TABLET ORAL
Status: DISCONTINUED | OUTPATIENT
Start: 2023-03-23 | End: 2023-03-23 | Stop reason: HOSPADM

## 2023-03-22 RX ORDER — ACETAMINOPHEN 325 MG/1
650 TABLET ORAL EVERY 4 HOURS PRN
Status: DISCONTINUED | OUTPATIENT
Start: 2023-03-22 | End: 2023-03-23

## 2023-03-22 RX ORDER — OXYCODONE HYDROCHLORIDE 5 MG/1
5 TABLET ORAL EVERY 4 HOURS PRN
Status: DISCONTINUED | OUTPATIENT
Start: 2023-03-22 | End: 2023-03-23

## 2023-03-22 RX ORDER — METOPROLOL TARTRATE 50 MG/1
50 TABLET, FILM COATED ORAL EVERY 12 HOURS SCHEDULED
Status: DISCONTINUED | OUTPATIENT
Start: 2023-03-22 | End: 2023-03-23 | Stop reason: HOSPADM

## 2023-03-22 RX ORDER — AMLODIPINE BESYLATE 5 MG/1
5 TABLET ORAL DAILY
Status: DISCONTINUED | OUTPATIENT
Start: 2023-03-23 | End: 2023-03-23 | Stop reason: HOSPADM

## 2023-03-22 RX ORDER — PREDNISONE 20 MG/1
20 TABLET ORAL ONCE
Status: COMPLETED | OUTPATIENT
Start: 2023-03-22 | End: 2023-03-22

## 2023-03-22 RX ORDER — SPIRONOLACTONE 25 MG/1
25 TABLET ORAL DAILY
Status: DISCONTINUED | OUTPATIENT
Start: 2023-03-23 | End: 2023-03-23 | Stop reason: HOSPADM

## 2023-03-22 RX ORDER — OXYCODONE HYDROCHLORIDE 5 MG/1
5 TABLET ORAL ONCE
Status: COMPLETED | OUTPATIENT
Start: 2023-03-22 | End: 2023-03-22

## 2023-03-22 RX ORDER — HEPARIN SODIUM 5000 [USP'U]/ML
5000 INJECTION, SOLUTION INTRAVENOUS; SUBCUTANEOUS EVERY 8 HOURS SCHEDULED
Status: DISCONTINUED | OUTPATIENT
Start: 2023-03-22 | End: 2023-03-23 | Stop reason: HOSPADM

## 2023-03-22 RX ORDER — LOSARTAN POTASSIUM 25 MG/1
25 TABLET ORAL DAILY
Status: DISCONTINUED | OUTPATIENT
Start: 2023-03-23 | End: 2023-03-22

## 2023-03-22 RX ORDER — LOSARTAN POTASSIUM 50 MG/1
100 TABLET ORAL DAILY
Status: DISCONTINUED | OUTPATIENT
Start: 2023-03-23 | End: 2023-03-23 | Stop reason: HOSPADM

## 2023-03-22 RX ORDER — HYDROMORPHONE HCL IN WATER/PF 6 MG/30 ML
0.2 PATIENT CONTROLLED ANALGESIA SYRINGE INTRAVENOUS EVERY 4 HOURS PRN
Status: DISCONTINUED | OUTPATIENT
Start: 2023-03-22 | End: 2023-03-23 | Stop reason: HOSPADM

## 2023-03-22 RX ORDER — PRAMIPEXOLE DIHYDROCHLORIDE 0.25 MG/1
0.25 TABLET ORAL
Status: DISCONTINUED | OUTPATIENT
Start: 2023-03-22 | End: 2023-03-23 | Stop reason: HOSPADM

## 2023-03-22 RX ORDER — INSULIN LISPRO 100 [IU]/ML
16 INJECTION, SOLUTION INTRAVENOUS; SUBCUTANEOUS 2 TIMES DAILY WITH MEALS
Status: DISCONTINUED | OUTPATIENT
Start: 2023-03-23 | End: 2023-03-23 | Stop reason: HOSPADM

## 2023-03-22 RX ORDER — INSULIN LISPRO 100 [IU]/ML
16 INJECTION, SOLUTION INTRAVENOUS; SUBCUTANEOUS
Status: DISCONTINUED | OUTPATIENT
Start: 2023-03-22 | End: 2023-03-22

## 2023-03-22 RX ORDER — INSULIN LISPRO 100 [IU]/ML
25 INJECTION, SOLUTION INTRAVENOUS; SUBCUTANEOUS
Status: DISCONTINUED | OUTPATIENT
Start: 2023-03-22 | End: 2023-03-23 | Stop reason: HOSPADM

## 2023-03-22 RX ORDER — VITAMIN B COMPLEX
1 CAPSULE ORAL DAILY
Status: DISCONTINUED | OUTPATIENT
Start: 2023-03-23 | End: 2023-03-22

## 2023-03-22 RX ORDER — PANTOPRAZOLE SODIUM 40 MG/1
40 TABLET, DELAYED RELEASE ORAL 2 TIMES DAILY
Status: DISCONTINUED | OUTPATIENT
Start: 2023-03-22 | End: 2023-03-23 | Stop reason: HOSPADM

## 2023-03-22 RX ORDER — LEVOTHYROXINE SODIUM 112 UG/1
112 TABLET ORAL
Status: DISCONTINUED | OUTPATIENT
Start: 2023-03-23 | End: 2023-03-23 | Stop reason: HOSPADM

## 2023-03-22 RX ORDER — GABAPENTIN 100 MG/1
100 CAPSULE ORAL
Status: DISCONTINUED | OUTPATIENT
Start: 2023-03-22 | End: 2023-03-23 | Stop reason: HOSPADM

## 2023-03-22 RX ORDER — ALBUTEROL SULFATE 90 UG/1
2 AEROSOL, METERED RESPIRATORY (INHALATION) EVERY 6 HOURS PRN
Status: DISCONTINUED | OUTPATIENT
Start: 2023-03-22 | End: 2023-03-23 | Stop reason: HOSPADM

## 2023-03-22 RX ORDER — ONDANSETRON HYDROCHLORIDE 4 MG/5ML
4 SOLUTION ORAL ONCE
Status: DISCONTINUED | OUTPATIENT
Start: 2023-03-22 | End: 2023-03-23 | Stop reason: HOSPADM

## 2023-03-22 RX ORDER — ACETAMINOPHEN 325 MG/1
650 TABLET ORAL ONCE
Status: COMPLETED | OUTPATIENT
Start: 2023-03-22 | End: 2023-03-22

## 2023-03-22 RX ORDER — TORSEMIDE 20 MG/1
20 TABLET ORAL DAILY
Status: DISCONTINUED | OUTPATIENT
Start: 2023-03-23 | End: 2023-03-23 | Stop reason: HOSPADM

## 2023-03-22 RX ORDER — DULOXETIN HYDROCHLORIDE 60 MG/1
60 CAPSULE, DELAYED RELEASE ORAL DAILY
Status: DISCONTINUED | OUTPATIENT
Start: 2023-03-23 | End: 2023-03-23 | Stop reason: HOSPADM

## 2023-03-22 RX ADMIN — PRAMIPEXOLE DIHYDROCHLORIDE 0.25 MG: 0.25 TABLET ORAL at 21:34

## 2023-03-22 RX ADMIN — PREDNISONE 20 MG: 20 TABLET ORAL at 15:25

## 2023-03-22 RX ADMIN — GABAPENTIN 100 MG: 100 CAPSULE ORAL at 21:34

## 2023-03-22 RX ADMIN — INSULIN LISPRO 25 UNITS: 100 INJECTION, SOLUTION INTRAVENOUS; SUBCUTANEOUS at 20:17

## 2023-03-22 RX ADMIN — ACETAMINOPHEN 650 MG: 325 TABLET ORAL at 15:25

## 2023-03-22 RX ADMIN — OXYCODONE HYDROCHLORIDE 5 MG: 5 TABLET ORAL at 17:42

## 2023-03-22 RX ADMIN — PANTOPRAZOLE SODIUM 40 MG: 40 TABLET, DELAYED RELEASE ORAL at 17:41

## 2023-03-22 RX ADMIN — METOPROLOL TARTRATE 50 MG: 50 TABLET, FILM COATED ORAL at 21:34

## 2023-03-22 RX ADMIN — HEPARIN SODIUM 5000 UNITS: 5000 INJECTION INTRAVENOUS; SUBCUTANEOUS at 21:34

## 2023-03-22 NOTE — CONSULTS
This 69-year-old woman was walking her dog tripped and fell sustaining injuries to her face and orbital fractures  She reports some pain around both eyes  She reports her vision is "clear " past ocular history is significant for LASEK  On examination, visual acuity without correction at near is 20/100 OU  Pupils are equal round and reactive to light with no afferent defect  Extraocular movements are unrestricted  There is 2+ ecchymosis and edema of the right upper lid and 1+ ecchymosis and edema of the left upper lid  Both corneas are clear and the conjunctiva is noninjected with no hemorrhage  The anterior chambers are formed  I am unable to obtain an accurate intraocular pressure due to the edema  Impression: Periocular hematoma, orbital fractures  No ruptured globe or other significant injury  Plan: Cold compresses  I will follow-up for a dilated fundus examination tomorrow once the swelling has subsided

## 2023-03-22 NOTE — ASSESSMENT & PLAN NOTE
Lab Results   Component Value Date    HGBA1C 6 8 (H) 03/07/2023       No results for input(s): POCGLU in the last 72 hours      Blood Sugar Average: Last 72 hrs:     · Continue with home insulin regimen  · Hypoglycemia protocol  · Frequent glucose checks

## 2023-03-22 NOTE — ASSESSMENT & PLAN NOTE
· Patient suffered the injury after a mechanical fall from standing  · Patient given a dose of Ancef  · OMFS evaluated the patient, no immediate surgical intervention warranted, they will continue to follow  · Multimodal pain control

## 2023-03-22 NOTE — ED PROVIDER NOTES
History  Chief Complaint   Patient presents with   • Fall     Pt to ED with c/o face first fall on cement step  Pt denies any blood thinners or ASA use  67year old female presenting after a trip and fall, hitting her face on the concrete  States bleeding from the nose that has now stopped  No blood thinners and no aspirin  Denies any chest pain, shortness of breath  States her face hurts  No change in vision, hearing  States her right pinkie finger hurts as well from the fall  Patient denies any syncope, seizure activity  Denies any systemic symptoms like confusion, nausea, vomiting, photophobia, fever, chills, abdominal pain  Patient states she has a history of kidney disease  Prior to Admission Medications   Prescriptions Last Dose Informant Patient Reported? Taking?    B-D ULTRAFINE III SHORT PEN 31G X 8 MM MISC   Yes No   Sig: USE AS DIRECTED 4 TIMES PER DAY   Calcium Citrate 1040 MG TABS   Yes No   Sig: Take 500 mg by mouth Three times a day   DULoxetine (CYMBALTA) 30 mg delayed release capsule   Yes No   Sig: Take 60 mg by mouth daily   Probiotic Product (PROBIOTIC PO)   Yes No   Sig: Take 1 capsule by mouth 2 (two) times a day   Vitamin D, Ergocalciferol, 2000 units CAPS   Yes No   Sig: Take 2,000 Units by mouth daily    acetaminophen (TYLENOL) 325 mg tablet   No No   Sig: Take 2 tablets (650 mg total) by mouth every 6 (six) hours as needed for mild pain, headaches or fever   albuterol (Ventolin HFA) 90 mcg/act inhaler   No No   Sig: Inhale 2 puffs every 6 (six) hours as needed for wheezing   amLODIPine (NORVASC) 5 mg tablet   No No   Sig: Take 1 tablet (5 mg total) by mouth daily   b complex vitamins capsule   Yes No   Sig: Take 1 capsule by mouth daily   gabapentin (Neurontin) 300 mg capsule   No No   Sig: Take one cap PO in the morning and afternoon and two caps PO in the evening   insulin aspart (NovoLOG) 100 units/mL injection   Yes No   Sig: Inject under the skin 3 (three) times a day before meals 16 units, 16 units, 25 units     insulin detemir (Levemir FlexTouch) 100 Units/mL injection pen   Yes No   Sig: Inject 50 Units under the skin every evening    levothyroxine 112 mcg tablet   Yes No   Sig: Take 112 mcg by mouth every morning   metoprolol tartrate (LOPRESSOR) 50 mg tablet   No No   Sig: TAKE 1 TABLET (50 MG TOTAL) BY MOUTH EVERY 12 (TWELVE) HOURS   olmesartan (BENICAR) 40 mg tablet   No No   Sig: Take 1 tablet (40 mg total) by mouth daily at bedtime   pantoprazole (PROTONIX) 40 mg tablet   No No   Sig: TAKE 1 TABLET BY MOUTH TWICE A DAY   pramipexole (MIRAPEX) 0 25 mg tablet   No No   Sig: Take 1 tablet (0 25 mg total) by mouth daily at bedtime   rosuvastatin (CRESTOR) 20 MG tablet   No No   Sig: Take 1 tablet (20 mg total) by mouth daily   Patient not taking: Reported on 2/13/2023   spironolactone (ALDACTONE) 25 mg tablet   No No   Sig: Take 1 tablet (25 mg total) by mouth daily   torsemide (DEMADEX) 20 mg tablet   No No   Sig: TAKE 1 TABLET BY MOUTH EVERY DAY      Facility-Administered Medications: None       Past Medical History:   Diagnosis Date   • Allergic    • Allergic rhinitis    • Anesthesia     pt reports "had to use double lumen endo tube for hiatal hernia repair/so surgeon could get to where he needed to work"   • Arthritis    • Aspiration into airway    • Basal cell carcinoma 2007    left cheek    • BCC (basal cell carcinoma) 05/27/2021    Left Nasal tip   • Cancer (HCC)     squamous cell cancer on forhead   • Cancer (HCC)     basal cell on nose    • Chronic kidney disease 2000, 2018    Stones, kidney disease stage 4   • Chronic pain disorder     bilat feet and joint pain on occas   • Colon polyp    • Constipation    • COPD (chronic obstructive pulmonary disease) ([facility] [address] )    • COVID-19 08/2021    recovered at home/did receive monoclonal infusion   • Dental bridge present    • Depression    • Diabetes mellitus ([facility] [address] )     Type 2   • Diabetic neuropathy ([facility] [address] )    • Disease of thyroid gland    • Family history of thyroid problem    • Fatty liver    • GERD (gastroesophageal reflux disease)    • Heart burn    • Hiatal hernia    • History of pneumonia    • Hyperlipidemia    • Hyperplasia, parathyroid ([address] )    • Hypertension    • Kidney problem    • Kidney stone    • Memory loss Julu2 2020   • Motion sickness    • Motion sickness    • Neck pain    • Obesity 1978   • Obesity (BMI 30 0-34  9)    • Pollen allergies    • RLS (restless legs syndrome)    • SCC (squamous cell carcinoma) 05/04/2021    left mid forehead   • Seasonal allergies    • Sleep apnea     has inspire   • Squamous cell skin cancer 2007    left cheek    • Swollen ankles    • 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, left     great toe fusion   • Urinary incontinence    • Urinary tract infection 03/28/2022   • Wears glasses        Past Surgical History:   Procedure Laterality Date   • ARTHROSCOPY KNEE     • BREAST BIOPSY      stereotactic-benign   • BREAST BIOPSY      stereo-benign   • BREAST EXCISIONAL BIOPSY      unknown date-benign   • BREAST EXCISIONAL BIOPSY      unknown date-benign   • BREAST EXCISIONAL BIOPSY      unknown date-benign   • BREAST EXCISIONAL BIOPSY      unknown age-benign   • CARDIAC CATHETERIZATION     • CHOLECYSTECTOMY     • COLONOSCOPY     • EXAMINATION UNDER ANESTHESIA N/A 06/24/2021    Procedure: EXAM UNDER ANESTHESIA (EUA), DISE;  Surgeon: Efrain Figueroa MD;  [facility]: [facility] MAIN OR;  Service: ENT   • HERNIA REPAIR     • HIATAL HERNIA REPAIR     • KNEE SURGERY      Torn maniscus lap surg   • LIPOSUCTION     • LYMPH NODE BIOPSY     • MOHS SURGERY  05/20/2021    left mid forehead-Gautam   • MOHS SURGERY Left 05/27/2021    Left nasal tip- gautam    • DC LIGATION/BIOPSY TEMPORAL ARTERY Left 1/5/2023    Procedure: BIOPSY ARTERY TEMPORAL;  Surgeon: Taina Varghese DO;  Location: [facility] Main OR;  Service: Vascular   • DC OPEN IMPLANTATION CRANIAL NERVE VASQUEZ & PULSE GEN N/A 11/10/2021    Procedure: INSERTION UPPER AIRWAY STIMULATOR, INSPIRE IMPLANT;  Surgeon: Jesse Sewell MD;  Location: [facility];  Service: ENT   • OH UNLISTED PROCEDURE FOOT/TOES Right 07/19/2022    Procedure: 1st metatarsal phalangeal joint fusion;  Surgeon: Natalie Ruff DPM;  [facility];  Service: Podiatry   • REDUCTION MAMMAPLASTY Bilateral 2000   • REDUCTION MAMMAPLASTY     • SKIN BIOPSY     • SKIN CANCER EXCISION  2007    squamous cell carcinoma    • SKIN CANCER EXCISION  2007    basal cell carcinoma   • SQUAMOUS CELL CARCINOMA EXCISION     • TOE SURGERY Right 08/04/2022    Right Great Toe Fusion   • TONSILLECTOMY     • TUBAL LIGATION     • UPPER GASTROINTESTINAL ENDOSCOPY     • US GUIDED BREAST BIOPSY RIGHT COMPLETE Right 07/18/2022       Family History   Problem Relation Age of Onset   • Heart disease Mother    • Depression Mother    • Hypertension Mother    • COPD Mother    • Hearing loss Mother    • Anxiety disorder Mother    • Heart disease Father    • Lung cancer Father 79        Smoker    • Cancer Father         brain   • Alcohol abuse Father    • Dementia Father    • Hypertension Father    • Thyroid disease Father    • COPD Father    • Arthritis Father    • Brain cancer Father 76   • Hypertension Sister    • Diabetes Sister    • Heart disease Sister    • Thyroid disease Sister    • Cancer Sister         Lympoma, lung   • Lung cancer Sister 78   • Anxiety disorder Sister    • Hypertension Brother    • Diabetes Brother    • Cancer Brother         Throat   • Dementia Brother    • Stroke Brother    • Hypertension Brother    • Heart disease Brother    • Diabetes Brother    • No Known Problems Son    • No Known Problems Son    • Brain cancer Paternal Aunt         unknown age   • Breast cancer Neg Hx      I have reviewed and agree with the history as documented      E-Cigarette/Vaping   • E-Cigarette Use Never User      E-Cigarette/Vaping Substances   • Nicotine No    • THC No    • CBD No    • Flavoring No    • Other No    • Unknown No Social History     Tobacco Use   • Smoking status: Former     Packs/day: 2 00     Years: 10 00     Pack years: 20 00     Types: Cigarettes     Start date: 1968     Quit date: 1976     Years since quittin 2   • Smokeless tobacco: Never   • Tobacco comments:     Smoked 2 pack a day   Vaping Use   • Vaping Use: Never used   Substance Use Topics   • Alcohol use: Yes     Comment: 1 or 2 a year   • Drug use: No       Review of Systems   Constitutional: Negative for chills and fever  HENT: Negative for ear pain, sore throat and trouble swallowing  Right eye and nose pain     Eyes: Negative for pain and visual disturbance  Respiratory: Negative for cough, choking, shortness of breath, wheezing and stridor  Cardiovascular: Negative for chest pain and palpitations  Gastrointestinal: Negative for abdominal pain, constipation, diarrhea, nausea and vomiting  Genitourinary: Negative for dysuria, flank pain, frequency and hematuria  Musculoskeletal: Positive for arthralgias (Right pinkie finger  )  Negative for back pain, gait problem, joint swelling, myalgias and neck pain  Skin: Positive for wound (To face and nose  No active bleeding at this time  )  Negative for color change, pallor and rash  Neurological: Positive for headaches  Negative for dizziness, tremors, seizures, syncope, facial asymmetry, speech difficulty, weakness, light-headedness and numbness  All other systems reviewed and are negative  Physical Exam  Physical Exam  Vitals and nursing note reviewed  Constitutional:       General: She is not in acute distress  Appearance: She is well-developed  She is ill-appearing  HENT:      Head: Normocephalic  Abrasion, contusion and right periorbital erythema present  No raccoon eyes, Phillips's sign, masses, left periorbital erythema or laceration   Hair is normal      Eyes:      Conjunctiva/sclera: Conjunctivae normal    Cardiovascular:      Rate and Rhythm: Normal rate and regular rhythm  Pulses: Normal pulses  Heart sounds: Normal heart sounds  No murmur heard  No friction rub  No gallop  Pulmonary:      Effort: Pulmonary effort is normal  No respiratory distress  Breath sounds: Normal breath sounds  No stridor  No wheezing, rhonchi or rales  Chest:      Chest wall: No tenderness  Abdominal:      Palpations: Abdomen is soft  Tenderness: There is no abdominal tenderness  There is no right CVA tenderness or left CVA tenderness  Musculoskeletal:         General: Tenderness present  No swelling  Cervical back: Neck supple  Right lower leg: No edema  Left lower leg: No edema  Skin:     General: Skin is warm and dry  Capillary Refill: Capillary refill takes less than 2 seconds  Coloration: Skin is not jaundiced or pale  Findings: Lesion present  No bruising, erythema or rash  Comments: 2 cm laceration to right cheek under the right eye     Neurological:      Mental Status: She is alert     Psychiatric:         Mood and Affect: Mood normal          Vital Signs  ED Triage Vitals [03/22/23 1456]   Temperature Pulse Respirations Blood Pressure SpO2   98 °F (36 7 °C) 75 16 (!) 182/86 97 %      Temp Source Heart Rate Source Patient Position - Orthostatic VS BP Location FiO2 (%)   Oral Monitor Sitting Right arm --      Pain Score       --           Vitals:    03/22/23 1456   BP: (!) 182/86   Pulse: 75   Patient Position - Orthostatic VS: Sitting         Visual Acuity      ED Medications  Medications   oxyCODONE (ROXICODONE) IR tablet 5 mg (has no administration in time range)   ondansetron (ZOFRAN) oral solution 4 mg (has no administration in time range)   albuterol (PROVENTIL HFA,VENTOLIN HFA) inhaler 2 puff (has no administration in time range)   amLODIPine (NORVASC) tablet 5 mg (has no administration in time range)   b complex vitamins capsule 1 capsule (has no administration in time range)   calcium carbonate (OYSTER SHELL,OSCAL) 500 mg tablet 2 tablet (has no administration in time range)   DULoxetine (CYMBALTA) delayed release capsule 60 mg (has no administration in time range)   gabapentin (NEURONTIN) capsule 100 mg (has no administration in time range)   insulin lispro (HumaLOG) 100 units/mL subcutaneous injection 16 Units (has no administration in time range)   insulin detemir (LEVEMIR FLEXTOUCH) 100 Units/mL subcutaneous injection pen 50 Units (has no administration in time range)   levothyroxine tablet 112 mcg (has no administration in time range)   metoprolol tartrate (LOPRESSOR) tablet 50 mg (has no administration in time range)   losartan (COZAAR) tablet 25 mg (has no administration in time range)   spironolactone (ALDACTONE) tablet 25 mg (has no administration in time range)   pantoprazole (PROTONIX) EC tablet 40 mg (has no administration in time range)   pramipexole (MIRAPEX) tablet 0 25 mg (has no administration in time range)   torsemide (DEMADEX) tablet 20 mg (has no administration in time range)   enoxaparin (LOVENOX) subcutaneous injection 30 mg (has no administration in time range)   acetaminophen (TYLENOL) tablet 650 mg (has no administration in time range)   oxyCODONE (ROXICODONE) split tablet 2 5 mg (has no administration in time range)   HYDROmorphone HCl (DILAUDID) injection 0 2 mg (has no administration in time range)   oxyCODONE (ROXICODONE) IR tablet 5 mg (has no administration in time range)   acetaminophen (TYLENOL) tablet 650 mg (650 mg Oral Given 3/22/23 1525)   predniSONE tablet 20 mg (20 mg Oral Given 3/22/23 1525)       Diagnostic Studies  Results Reviewed     Procedure Component Value Units Date/Time    Platelet count [[id number]]     Lab Status: No result Specimen: Blood     CBC and differential [[id number]]     Lab Status: No result Specimen: Blood     Comprehensive metabolic panel [[id number]]     Lab Status: No result Specimen: Blood                  CT facial bones without contrast   Final Result by Khadra Lentz MD (03/22 1614)      Prominently displaced fracture of the inferior wall of the right orbit without intraorbital muscular entrapment  Acute mildly displaced bilateral nasal bone fractures and increased leftward nasal septal deviation  Right maxillary sinus hemorrhage  The study was marked in [facility] for immediate notification  Workstation performed: [id number]         CT head without contrast   Final Result by Khadra Lentz MD (03/22 1610)      No intracranial hemorrhage or calvarial fracture  CT facial bones is reported separately  Workstation performed: [id number]         XR chest 1 view portable   ED Interpretation by Edward Dela Cruz PA-C (03/22 1552)   Possible left pleural effusion, although patient is rotated  No other acute cardiopulmonary process  XR hand 3+ views RIGHT   ED Interpretation by Edward Dela Cruz PA-C (03/22 1553)   No acute fractures as interpreted by me  Degenerative changes  CT chest wo contrast    (Results Pending)              Procedures  Procedures         ED Course                                             Medical Decision Making  67year old female presenting today with concerns of a fall and headstrike  Patient tripped and fell, no syncope and no seizure like activity  And landed on her face as her dog pulled away from her   Denies any photophobia, weakness, dizziness  Patient did not take any medications prior to arrival  No blood thinners or aspirin  CT facial bones and CT head both without contrast   XR of chest and right hand to rule out fracture  Prednisone + tylenol for swelling and pain, respectively  Will give oxycodone as pain is notr controlled  CT facial bones showing orbital blowoiut fracture without any entrapment  CT head without any acute intracranial abnormality  Trauma contacted and will evaluate the patient   Patient will be admitted to trauma with OMS consultation  Amount and/or Complexity of Data Reviewed  Labs: ordered  Radiology: ordered and independent interpretation performed  Risk  OTC drugs  Prescription drug management  Disposition  Final diagnoses:   Nasal bone fracture   Fall, initial encounter   Injury of head, initial encounter   Right orbital fracture [facility])     Time reflects when diagnosis was documented in both MDM as applicable and the Disposition within this note     Time User Action Codes Description Comment    3/22/2023  4:27 PM Noris Dolphin Add [S02  2XXA] Nasal bone fracture     3/22/2023  4:28 PM Noris Dolphin Add [K08  ZBQL] Fall, initial encounter     3/22/2023  4:28 PM Noris Dolphin Add [L19 55CH] Injury of head, initial encounter     3/22/2023  4:28 PM Noris Dolphin Add [J81 12KQ] Right orbital fracture ([address] )     3/22/2023  5:28 PM Randy Allen Add [S02  2XXA] Closed fracture of nasal bone, initial encounter     3/22/2023  5:29 PM Randy Allen Add [S02 85XA] Right orbit fracture, closed, initial encounter [facility])       ED Disposition     None      Follow-up Information    None         Patient's Medications   Discharge Prescriptions    No medications on file       No discharge procedures on file      PDMP Review       Value Time User    PDMP Reviewed  Yes 11/10/2021  3:47 PM Low Olmedo MD          ED Provider  Electronically Signed by           Keri Gonzalez PA-C  03/22/23 0667

## 2023-03-22 NOTE — ASSESSMENT & PLAN NOTE
· Fall resulting in the described injuries below  · Baseline ambulatory dysfunction  · PT/OT to evaluate  · Gerontology consulted

## 2023-03-22 NOTE — ASSESSMENT & PLAN NOTE
· Patient has baseline ambulatory dysfunction  · Patient fell resulting in the injuries described below  · PT/OT

## 2023-03-22 NOTE — H&P
[facility]  H&P Note Dulcie Aschoff 1950, 67 y o  female MRN: [id number]  Unit/Bed#: [facility]MY MUNOZ 295-91 Encounter: [id number]  Primary Care Provider: Michael Thomas MD   Date and time admitted to hospital: 3/22/2023  2:58 PM    Facial abrasion, initial encounter  Assessment & Plan  · Several small facial abrasions after a fall from standing  · Glue applied to the abrasions    Nasal bones, closed fracture  Assessment & Plan  · Patient suffered the injury after a mechanical fall from standing  · Patient given a dose of Ancef  · OMFS evaluated the patient, no immediate surgical intervention warranted, they will continue to follow  · Multimodal pain control    Fall  Assessment & Plan  · Fall resulting in the described injuries below  · Baseline ambulatory dysfunction  · PT/OT to evaluate  · Gerontology consulted    Hypothyroid  Assessment & Plan  · Continue with home dose levothyroxine    Depression, recurrent ([facility] )  Assessment & Plan  · Continue with home Cymbalta    Ambulatory dysfunction  Assessment & Plan  · Patient has baseline ambulatory dysfunction  · Patient fell resulting in the injuries described below  · PT/OT    HTN (hypertension)  Assessment & Plan  · Continue with home Norvasc, Lopressor, losartan, spironolactone, torsemide    Type 2 diabetes mellitus with diabetic nephropathy, with long-term current use of insulin ([facility] )  Assessment & Plan  Lab Results   Component Value Date    HGBA1C 6 8 (H) 03/07/2023       No results for input(s): POCGLU in the last 72 hours      Blood Sugar Average: Last 72 hrs:     · Continue with home insulin regimen  · Hypoglycemia protocol  · Frequent glucose checks    Stage 4 chronic kidney disease [facility])  Assessment & Plan  Lab Results   Component Value Date    EGFR 25 03/22/2023    EGFR 28 02/23/2023    EGFR 27 02/14/2023    CREATININE 1 93 (H) 03/22/2023    CREATININE 1 73 (H) 02/23/2023    CREATININE 1 81 (H) 02/14/2023     · Baseline creatinine between 1 7 and 1 9  · Avoid nephrotoxic medications    * Right orbital fracture, closed, initial encounter [facility])  Assessment & Plan  · Patient suffered the injury after a mechanical fall from standing  · Significant ecchymoses and swelling surrounding the right eye  · Vision is grossly intact, she is able to move her eye in all directions without any significant tenderness, no evidence of impending compartment syndrome  · Patient given a dose of Ancef  · OMFS consulted and evaluated the patient, no immediate surgical intervention warranted, will continue to follow  · Ophthalmology consulted and their evaluation is pending  · Multimodal pain control    Trauma Alert: Evaluation; trauma team notified at 4:30 via text, returned call/text yes 4:31, arrived at 4:40    Model of Arrival: [facility]    Trauma Team: Attending Dr Amanda Thomas and Residents Dr Alcantar Nurse  Consultants:     Oral Maxillofacial: routine consult; Epic consult order placed; Ophthalmology: routine consult; Epic consult order placed and consultant notified (via phone/text @ time 6:45 PM); History of Present Illness     Chief Complaint: Fall, right orbital pain, pain over nasal bridge  Mechanism:Fall     HPI:    Jenny Villeda is a 67 y o  female who presents with a right orbital fracture, bilateral nasal bone fractures, after a mechanical fall from standing, she was walking her dog when her dog accidentally pulled her down  She does not take any blood thinners, no aspirin  Patient endorses a moderate amount of pain over her right orbit, there is a large amount of swelling as well and she has difficulty opening her eye  Vision is grossly intact, she is able to complete all eye movements without any significant tenderness  She denies any headache, dizziness, confusion, nausea or vomiting, abdominal tenderness, significant pain in her extremities  Patient to be admitted to the hospital for further treatment evaluation      Review of Systems Constitutional: Negative for chills and fever  HENT: Positive for facial swelling  Negative for congestion and sinus pain  Right-sided facial swelling, ecchymoses, tenderness after a fall   Eyes: Negative for photophobia and visual disturbance  Respiratory: Negative for chest tightness and shortness of breath  Cardiovascular: Negative for chest pain and palpitations  Gastrointestinal: Negative for abdominal pain and diarrhea  Endocrine: Negative for polyphagia and polyuria  Genitourinary: Positive for difficulty urinating  Negative for dysuria and hematuria  Musculoskeletal: Negative for arthralgias and joint swelling  Skin: Positive for wound  Negative for rash  Neurological: Negative for dizziness, weakness, light-headedness, numbness and headaches  Psychiatric/Behavioral: Negative for agitation and confusion  All other systems reviewed and are negative  12-point, complete review of systems was reviewed and negative except as stated above       Historical Information     Past Medical History:   Diagnosis Date   • Allergic    • Allergic rhinitis    • Anesthesia     pt reports "had to use double lumen endo tube for hiatal hernia repair/so surgeon could get to where he needed to work"   • Arthritis    • Aspiration into airway    • Basal cell carcinoma 2007    left cheek    • BCC (basal cell carcinoma) 05/27/2021    Left Nasal tip   • Cancer (HCC)     squamous cell cancer on forhead   • Cancer ([facility] )     basal cell on nose    • Chronic kidney disease 2000, 2018    Stones, kidney disease stage 4   • Chronic pain disorder     bilat feet and joint pain on occas   • Colon polyp    • Constipation    • COPD (chronic obstructive pulmonary disease) ([facility] )    • COVID-19 08/2021    recovered at home/did receive monoclonal infusion   • Dental bridge present    • Depression    • Diabetes mellitus ([facility] [address] )     Type 2   • Diabetic neuropathy ([facility] )    • Disease of thyroid gland    • Family history of thyroid problem    • Fatty liver    • GERD (gastroesophageal reflux disease)    • Heart burn    • Hiatal hernia    • History of pneumonia    • Hyperlipidemia    • Hyperplasia, parathyroid ([address] )    • Hypertension    • Kidney problem    • Kidney stone    • Memory loss Julu2 2020   • Motion sickness    • Motion sickness    • Neck pain    • Obesity 1978   • Obesity (BMI 30 0-34  9)    • Pollen allergies    • RLS (restless legs syndrome)    • SCC (squamous cell carcinoma) 05/04/2021    left mid forehead   • Seasonal allergies    • Sleep apnea     has inspire   • Squamous cell skin cancer 2007    left cheek    • Swollen ankles    • 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, left     great toe fusion   • Urinary incontinence    • Urinary tract infection 03/28/2022   • Wears glasses      Past Surgical History:   Procedure Laterality Date   • ARTHROSCOPY KNEE     • BREAST BIOPSY      stereotactic-benign   • BREAST BIOPSY      stereo-benign   • BREAST EXCISIONAL BIOPSY      unknown date-benign   • BREAST EXCISIONAL BIOPSY      unknown date-benign   • BREAST EXCISIONAL BIOPSY      unknown date-benign   • BREAST EXCISIONAL BIOPSY      unknown age-benign   • CARDIAC CATHETERIZATION     • CHOLECYSTECTOMY     • COLONOSCOPY     • EXAMINATION UNDER ANESTHESIA N/A 06/24/2021    Procedure: EXAM UNDER ANESTHESIA (EUA), DISE;  Surgeon: Jesse Sewell MD;  Location: [facility] MAIN OR;  Service: ENT   • HERNIA REPAIR     • HIATAL HERNIA REPAIR     • KNEE SURGERY      Torn maniscus lap surg   • LIPOSUCTION     • LYMPH NODE BIOPSY     • MOHS SURGERY  05/20/2021    left mid forehead-Gautam   • MOHS SURGERY Left 05/27/2021    Left nasal tip- gautam    • MO LIGATION/BIOPSY TEMPORAL ARTERY Left 1/5/2023    Procedure: BIOPSY ARTERY TEMPORAL;  Surgeon: Tico Oneill DO;  Location: [facility] Main OR;  Service: Vascular   • MO OPEN IMPLANTATION CRANIAL NERVE VASQUEZ & PULSE GEN N/A 11/10/2021    Procedure: INSERTION UPPER AIRWAY STIMULATOR, INSPIRE IMPLANT;  Surgeon: Romi Harrison MD;  Location: [facility] Main OR;  Service: ENT   • SC UNLISTED PROCEDURE FOOT/TOES Right 2022    Procedure: 1st metatarsal phalangeal joint fusion;  Surgeon: Harry Rose DPM;  Location: [facility] Main OR;  Service: Podiatry   • REDUCTION MAMMAPLASTY Bilateral 2000   • REDUCTION MAMMAPLASTY     • SKIN BIOPSY     • SKIN CANCER EXCISION      squamous cell carcinoma    • SKIN CANCER EXCISION      basal cell carcinoma   • SQUAMOUS CELL CARCINOMA EXCISION     • TOE SURGERY Right 2022    Right Great Toe Fusion   • TONSILLECTOMY     • TUBAL LIGATION     • UPPER GASTROINTESTINAL ENDOSCOPY     • US GUIDED BREAST BIOPSY RIGHT COMPLETE Right 2022        Social History     Tobacco Use   • Smoking status: Former     Packs/day: 2 00     Years: 10 00     Pack years: 20 00     Types: Cigarettes     Start date: 1968     Quit date: 1976     Years since quittin 2   • Smokeless tobacco: Never   • Tobacco comments:     Smoked 2 pack a day   Vaping Use   • Vaping Use: Never used   Substance Use Topics   • Alcohol use: Yes     Comment: 1 or 2 a year   • Drug use: No     Immunization History   Administered Date(s) Administered   • COVID-19 MODERNA VACC 0 5 ML IM 2021, 2021, 2022   • INFLUENZA 10/17/2018, 10/01/2019   • Influenza Split High Dose Preservative Free IM 10/17/2018, 10/01/2019   • Influenza, high dose seasonal 0 7 mL 2020, 10/12/2021   • Pneumococcal Conjugate Vaccine 20-valent (Pcv20), Polysace 2022, 2022   • Pneumococcal Polysaccharide PPV23 2021   • Tdap 2018   • influenza, trivalent, adjuvanted 2020, 10/12/2021     Last Tetanus: 2018  Family History: Non-contributory    1  Before the illness or injury that brought you to the Emergency, did you need someone to help you on a regular basis? 0=No   2   Since the illness or injury that brought you to the Emergency, have you needed more help than usual to take care of yourself? 1=Yes   3  Have you been hospitalized for one or more nights during the past 6 months (excluding a stay in the Emergency Department)? 1=Yes   4  In general, do you see well? 0=Yes   5  In general, do you have serious problems with your memory? 0=No   6  Do you take more than three different medications everyday? 1=Yes   TOTAL   3     Did you order a geriatric consult if the score was 2 or greater?: yes     Meds/Allergies   all current active meds have been reviewed     Allergies   Allergen Reactions   • Sulfamethoxazole-Trimethoprim Other (See Comments)     Tongue swelling   • Ciprofloxacin Hives and Itching       Objective   Initial Vitals:   Temperature: 98 °F (36 7 °C) (03/22/23 1456)  Pulse: 75 (03/22/23 1456)  Respirations: 16 (03/22/23 1456)  Blood Pressure: (!) 182/86 (03/22/23 1456)    Primary Survey:   Airway:        Status: patent;        Pre-hospital Interventions: none        Hospital Interventions: none  Breathing:        Pre-hospital Interventions: none       Effort: normal       Right breath sounds: normal       Left breath sounds: normal  Circulation:        Rhythm: regular       Rate: regular   Right Pulses Left Pulses    R radial: 2+  R femoral: 2+       L radial: 2+  L femoral: 2+         Disability:        GCS: Eye: 4; Verbal: 5 Motor: 6 Total: 15       Right Pupil: round;  reactive         Left Pupil:  round;  reactive      R Motor Strength L Motor Strength    R : 5/5  R dorsiflex: 5/5  R plantarflex: 5/5 L : 5/5  L dorsiflex: 5/5  L plantarflex: 5/5        Sensory:  No sensory deficit  Exposure:       Completed: Yes      Secondary Survey:  Physical Exam  Constitutional:       General: She is not in acute distress  HENT:      Head: Normocephalic  Abrasion and contusion present        Comments: Patient has a large amount of swelling around the right orbit, ecchymoses throughout the the right and left orbit, small abrasion in her middle forehead between her eyebrows, another small abrasion over the bridge of the nose     Right Ear: External ear normal       Left Ear: External ear normal       Nose: No congestion or rhinorrhea  Mouth/Throat:      Mouth: Mucous membranes are moist       Pharynx: Oropharynx is clear  Eyes:      Extraocular Movements: Extraocular movements intact  Right eye: Normal extraocular motion  Left eye: Normal extraocular motion  Conjunctiva/sclera: Conjunctivae normal       Right eye: Right conjunctiva is not injected  Chemosis present  Left eye: Left conjunctiva is not injected  Pupils: Pupils are equal, round, and reactive to light  Comments: Pupils are both equal and reactive, vision is grossly intact, patient able to move her eyes in all directions without any significant tenderness, no evidence of compartment syndrome   Cardiovascular:      Rate and Rhythm: Normal rate and regular rhythm  Pulmonary:      Effort: Pulmonary effort is normal  No respiratory distress  Breath sounds: No wheezing  Abdominal:      General: Abdomen is flat  Palpations: Abdomen is soft  Tenderness: There is no abdominal tenderness  Musculoskeletal:         General: No swelling, tenderness or signs of injury  Cervical back: No rigidity or tenderness  Skin:     General: Skin is warm  Findings: Bruising present  Neurological:      General: No focal deficit present  Mental Status: She is alert and oriented to person, place, and time     Psychiatric:         Mood and Affect: Mood normal          Behavior: Behavior normal          Invasive Devices     Peripheral Intravenous Line  Duration           Peripheral IV 03/22/23 Right Antecubital <1 day              Lab Results:   Results: I have personally reviewed all pertinent laboratory/tests results, BMP/CMP:   Lab Results   Component Value Date    SODIUM 139 03/22/2023    K 3 5 03/22/2023    CL 98 03/22/2023    CO2 32 03/22/2023    BUN 36 (H) 03/22/2023    CREATININE 1 93 (H) 03/22/2023    CALCIUM 9 7 03/22/2023    AST 18 03/22/2023    ALT 21 03/22/2023    ALKPHOS 57 03/22/2023    EGFR 25 03/22/2023    and CBC:   Lab Results   Component Value Date    WBC 13 07 (H) 03/22/2023    HGB 14 1 03/22/2023    HCT 43 5 03/22/2023    MCV 90 03/22/2023     03/22/2023     03/22/2023    MCH 29 3 03/22/2023    MCHC 32 4 03/22/2023    RDW 14 6 03/22/2023    MPV 11 3 03/22/2023    MPV 11 2 03/22/2023    NRBC 0 03/22/2023       Imaging Results: I have personally reviewed pertinent reports  Chest Xray(s): negative for acute findings   FAST exam(s): N/A   CT Scan(s): positive for acute findings: Displaced right orbital fracture, bilateral nasal bone fractures   Additional Xray(s): negative for acute findings     Other Studies: X-ray of the right hand, no evidence of acute fractures    Code Status: Level 3 - DNAR and DNI  Advance Directive and Living Will: Yes    Power of :    POLST:    I have spent 45 minutes with Patient and family today in which greater than 50% of this time was spent in counseling/coordination of care regarding Diagnostic results, Prognosis, Risks and benefits of tx options, Patient and family education, Risk factor reductions, Impressions, Counseling / Coordination of care, Documenting in the medical record, Reviewing / ordering tests, medicine, procedures  , Obtaining or reviewing history   and Communicating with other healthcare professionals   Patient to be admitted to the hospital for further treatment and testing

## 2023-03-22 NOTE — ASSESSMENT & PLAN NOTE
· Patient suffered the injury after a mechanical fall from standing  · Significant ecchymoses and swelling surrounding the right eye  · Vision is grossly intact, she is able to move her eye in all directions without any significant tenderness, no evidence of impending compartment syndrome  · Patient given a dose of Ancef  · OMFS consulted and evaluated the patient, no immediate surgical intervention warranted, will continue to follow  · Ophthalmology consulted and their evaluation is pending  · Multimodal pain control

## 2023-03-22 NOTE — ASSESSMENT & PLAN NOTE
Lab Results   Component Value Date    EGFR 25 03/22/2023    EGFR 28 02/23/2023    EGFR 27 02/14/2023    CREATININE 1 93 (H) 03/22/2023    CREATININE 1 73 (H) 02/23/2023    CREATININE 1 81 (H) 02/14/2023     · Baseline creatinine between 1 7 and 1 9  · Avoid nephrotoxic medications

## 2023-03-23 VITALS
HEIGHT: 62 IN | HEART RATE: 71 BPM | TEMPERATURE: 97.9 F | BODY MASS INDEX: 30.18 KG/M2 | OXYGEN SATURATION: 96 % | RESPIRATION RATE: 16 BRPM | SYSTOLIC BLOOD PRESSURE: 121 MMHG | DIASTOLIC BLOOD PRESSURE: 77 MMHG

## 2023-03-23 LAB
APTT PPP: 24 SECONDS (ref 23–37)
GLUCOSE SERPL-MCNC: 222 MG/DL (ref 65–140)
GLUCOSE SERPL-MCNC: 234 MG/DL (ref 65–140)
INR PPP: 0.98 (ref 0.84–1.19)
PROTHROMBIN TIME: 13.2 SECONDS (ref 11.6–14.5)

## 2023-03-23 RX ORDER — ACETAMINOPHEN 325 MG/1
975 TABLET ORAL EVERY 8 HOURS SCHEDULED
Status: DISCONTINUED | OUTPATIENT
Start: 2023-03-23 | End: 2023-03-23 | Stop reason: HOSPADM

## 2023-03-23 RX ORDER — HYDROMORPHONE HYDROCHLORIDE 2 MG/1
2 TABLET ORAL EVERY 4 HOURS PRN
Status: DISCONTINUED | OUTPATIENT
Start: 2023-03-23 | End: 2023-03-23 | Stop reason: HOSPADM

## 2023-03-23 RX ORDER — HYDROMORPHONE HYDROCHLORIDE 2 MG/1
1 TABLET ORAL EVERY 4 HOURS PRN
Status: DISCONTINUED | OUTPATIENT
Start: 2023-03-23 | End: 2023-03-23 | Stop reason: HOSPADM

## 2023-03-23 RX ORDER — FLUTICASONE PROPIONATE 50 MCG
1 SPRAY, SUSPENSION (ML) NASAL 4 TIMES DAILY PRN
Status: DISCONTINUED | OUTPATIENT
Start: 2023-03-23 | End: 2023-03-23 | Stop reason: HOSPADM

## 2023-03-23 RX ORDER — ACETAMINOPHEN 325 MG/1
975 TABLET ORAL EVERY 8 HOURS SCHEDULED
Refills: 0
Start: 2023-03-23

## 2023-03-23 RX ORDER — ECHINACEA PURPUREA EXTRACT 125 MG
1 TABLET ORAL
Status: DISCONTINUED | OUTPATIENT
Start: 2023-03-23 | End: 2023-03-23 | Stop reason: HOSPADM

## 2023-03-23 RX ORDER — HYDROMORPHONE HYDROCHLORIDE 2 MG/1
TABLET ORAL
Qty: 20 TABLET | Refills: 0 | Status: SHIPPED | OUTPATIENT
Start: 2023-03-23 | End: 2023-04-03

## 2023-03-23 RX ORDER — ECHINACEA PURPUREA EXTRACT 125 MG
1 TABLET ORAL
Qty: 15 ML | Refills: 0 | Status: SHIPPED | OUTPATIENT
Start: 2023-03-23

## 2023-03-23 RX ADMIN — INSULIN LISPRO 16 UNITS: 100 INJECTION, SOLUTION INTRAVENOUS; SUBCUTANEOUS at 11:28

## 2023-03-23 RX ADMIN — AMLODIPINE BESYLATE 5 MG: 5 TABLET ORAL at 08:39

## 2023-03-23 RX ADMIN — LOSARTAN POTASSIUM 100 MG: 50 TABLET, FILM COATED ORAL at 08:38

## 2023-03-23 RX ADMIN — Medication 2 TABLET: at 08:39

## 2023-03-23 RX ADMIN — LEVOTHYROXINE SODIUM 112 MCG: 112 TABLET ORAL at 05:44

## 2023-03-23 RX ADMIN — ACETAMINOPHEN 975 MG: 325 TABLET ORAL at 08:38

## 2023-03-23 RX ADMIN — TORSEMIDE 20 MG: 20 TABLET ORAL at 08:38

## 2023-03-23 RX ADMIN — SPIRONOLACTONE 25 MG: 25 TABLET, FILM COATED ORAL at 08:39

## 2023-03-23 RX ADMIN — METOPROLOL TARTRATE 50 MG: 50 TABLET, FILM COATED ORAL at 08:38

## 2023-03-23 RX ADMIN — SALINE NASAL SPRAY 1 SPRAY: 1.5 SOLUTION NASAL at 11:28

## 2023-03-23 RX ADMIN — HYDROMORPHONE HYDROCHLORIDE 2 MG: 2 TABLET ORAL at 10:37

## 2023-03-23 RX ADMIN — PANTOPRAZOLE SODIUM 40 MG: 40 TABLET, DELAYED RELEASE ORAL at 08:39

## 2023-03-23 RX ADMIN — INSULIN LISPRO 16 UNITS: 100 INJECTION, SOLUTION INTRAVENOUS; SUBCUTANEOUS at 08:40

## 2023-03-23 RX ADMIN — HEPARIN SODIUM 5000 UNITS: 5000 INJECTION INTRAVENOUS; SUBCUTANEOUS at 05:44

## 2023-03-23 RX ADMIN — B-COMPLEX W/ C & FOLIC ACID TAB 1 TABLET: TAB at 08:39

## 2023-03-23 RX ADMIN — DULOXETINE HYDROCHLORIDE 60 MG: 60 CAPSULE, DELAYED RELEASE ORAL at 08:38

## 2023-03-23 NOTE — CONSULTS
Consultation - Geriatric Medicine   Sasha Ardon 67 y o  female MRN: [id number]  Unit/Bed#: W -01 Encounter: [id number]        Inpatient consult to Gerontology  Consult performed by: Sue Avalos MD  Consult ordered by: Atul Benítez DO            Assessment/Plan      Right anterior seventh and eighth rib buckle fracture: Patient presenting with pain in the right side of her chest after mechanical fall  CT of the chest demonstrating right anterior seventh and eighth rib buckle fracture and all left sixth and seventh rib deformities  Encourage deep breathing and coughing, incentive spirometry  Pain management was consulted  Continue rib fracture protocol  Acute mildly displaced bilateral nasal bone fracture: OMS was consulted, recommended sinus precaution and follow-up as an outpatient    Displaced fracture of the inferior wall of the right orbit: Patient with periocular hematoma and orbital fractures but no ruptured globe or other significant injury, continue cold compresses  Ophthalmology following closely  Type 2 diabetes mellitus: Follows with endocrinology, currently on insulin detemir 50 units at bedtime and insulin aspart 16 units with breakfast and lunch and 25 units with dinner, patient uses a sliding scale and has a continuous glucose monitor  Her last A1c was 6 82 weeks ago  Hypothyroidism: On levothyroxine 112 mcg daily, last TSH was 5 06 on 3/7/23    CKD stage IV: Baseline creatinine 1 5-2 0, on torsemide 20 mg daily, Aldactone 25 mg daily  Continue to monitor kidney function  Following with Dr Felipe Aguilera as outpatient    Hypertension: Olmesartan recently increased to 40 mg at bedtime to help with morning readings  She is also on amlodipine 5 mg daily, torsemide and Aldactone  Following up with nephrology    Hypertriglyceridemia: She was on on Zetia and a statin but that was a stopped  Recommended heart healthy diet    She is taking fish oil with improvement in her numbers  Fall precautions:   -encourage use of assistant devices as directed by PT/OT  -encourage adequate lighting and assistance when out of bed  -encourage reduction of tethers to bed/IV to reduce trip hazards: consider hep lock IV when not actively infusing, ensure call bell is within reach  -encourage appropriate body mechanics and monitor for hypotension/orthostatic hypotension  -recommend appropriate footwear at all times   -PT/OT and early frequent mobilization      Impaired Vision due to recent trauma  -encourage adequate lighting and encourage use of assistance with ambulation  -keep personal belongings close to person to avoid reaching    Home medication reviewed with [facility] :  Amlodipine 5 mg daily   Gabapentin 100mg  QHS    Insulin Aspart 16 U with breakfast and lunch, 25 U with dinner   Insulin detemir 50 U QHS  Levothyroxine 112mcg daily   Metoprolol tartrate 50 mg bid   Pramipexole 0 25 qhs  Aldactone 25 mg daily    Torsemide 20 mg daily  Duloxetine 60 mg daily  Olmersartan 40 mg   Pantoprazole 40 mg bid     History of Present Illness   Physician Requesting Consult: Pinky Cool DO  Reason for Consult / Principal Problem: fall  Hx and PE limited by: n/a  Additional history obtained from: chart review       HPI: Heather Sutton is a 67y o  year old female who presented after a fall when she was walking her dog  Patient tripped and fall to the concrete  Apparently her dog pulled her off balance while stepping off a stair and she fell on her face  On initial evaluation blood pressure was elevated, rest of the vital signs were normal   She had mild leukocytosis with neutrophilia, creatinine elevated at 1 9 which is around baseline, glucose mildly elevated to 153    Imaging revealing a prominently displaced fracture of the inferior wall of the right orbit, acute mildly displaced bilateral nasal bone fractures and right anterior seventh and eighth rib buckle fractures  Patient lives in a 1 floor private home with her , she is a retired respiratory therapist   Patient is independent for ADLs and IADLs  She denies any memory difficulties, hearing loss or vision impairment in the past   No other recent falls  She walks independently, sometimes uses a rollator  Patient was seen this morning, she is complaining of pain in the right side of her chest, she states the facial swelling is much better today  No additional complaints, she wants to go home today if it is possible  Review of Systems   Constitutional: Negative for activity change, appetite change, chills, diaphoresis, fatigue and fever  HENT: Positive for facial swelling  Negative for dental problem, ear discharge, ear pain, hearing loss, nosebleeds, rhinorrhea, trouble swallowing and voice change  Respiratory: Negative for cough and shortness of breath  Cardiovascular: Positive for chest pain  Negative for palpitations and leg swelling  Gastrointestinal: Negative for abdominal distention, abdominal pain and vomiting  Endocrine: Negative for cold intolerance and heat intolerance  Genitourinary: Negative for difficulty urinating  Musculoskeletal: Positive for back pain  Skin: Positive for color change  Neurological: Negative for tremors, speech difficulty, light-headedness, numbness and headaches  Psychiatric/Behavioral: Negative for agitation, behavioral problems, confusion and decreased concentration           Historical Information   Past medical history:   Past Medical History:   Diagnosis Date   • Allergic    • Allergic rhinitis    • Anesthesia     pt reports "had to use double lumen endo tube for hiatal hernia repair/so surgeon could get to where he needed to work"   • Arthritis    • Aspiration into airway    • Basal cell carcinoma 2007    left cheek    • BCC (basal cell carcinoma) 05/27/2021    Left Nasal tip   • Cancer ([facility] [address] )     squamous cell cancer on forhead   • Cancer ([place] [address] )     basal cell on nose    • Chronic kidney disease 2000, 2018    Stones, kidney disease stage 4   • Chronic pain disorder     bilat feet and joint pain on occas   • Colon polyp    • Constipation    • COPD (chronic obstructive pulmonary disease) ([place] [address] )    • COVID-19 08/2021    recovered at home/did receive monoclonal infusion   • Dental bridge present    • Depression    • Diabetes mellitus ([place] [address] )     Type 2   • Diabetic neuropathy ([place] [address] )    • Disease of thyroid gland    • Family history of thyroid problem    • Fatty liver    • GERD (gastroesophageal reflux disease)    • Heart burn    • Hiatal hernia    • History of pneumonia    • Hyperlipidemia    • Hyperplasia, parathyroid ([place] [address] )    • Hypertension    • Kidney problem    • Kidney stone    • Memory loss Julu2 2020   • Motion sickness    • Motion sickness    • Neck pain    • Obesity 1978   • Obesity (BMI 30 0-34  9)    • Pollen allergies    • RLS (restless legs syndrome)    • SCC (squamous cell carcinoma) 05/04/2021    left mid forehead   • Seasonal allergies    • Sleep apnea     has inspire   • Squamous cell skin cancer 2007    left cheek    • Swollen ankles    • 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, left     great toe fusion   • Urinary incontinence    • Urinary tract infection 03/28/2022   • Wears glasses      Past surgical history:   Past Surgical History:   Procedure Laterality Date   • ARTHROSCOPY KNEE     • BREAST BIOPSY      stereotactic-benign   • BREAST BIOPSY      stereo-benign   • BREAST EXCISIONAL BIOPSY      unknown date-benign   • BREAST EXCISIONAL BIOPSY      unknown date-benign   • BREAST EXCISIONAL BIOPSY      unknown date-benign   • BREAST EXCISIONAL BIOPSY      unknown age-benign   • CARDIAC CATHETERIZATION     • CHOLECYSTECTOMY     • COLONOSCOPY     • EXAMINATION UNDER ANESTHESIA N/A 06/24/2021    Procedure: EXAM UNDER ANESTHESIA (EUA), DISE;  Surgeon: Low Olmedo MD;  Location: [facility] MAIN OR;  Service: ENT   • HERNIA REPAIR     • HIATAL HERNIA REPAIR     • KNEE SURGERY      Torn maniscus lap surg   • LIPOSUCTION     • LYMPH NODE BIOPSY     • MOHS SURGERY  2021    left mid forehead-Gautam   • MOHS SURGERY Left 2021    Left nasal tip- gautam    • OR LIGATION/BIOPSY TEMPORAL ARTERY Left 2023    Procedure: BIOPSY ARTERY TEMPORAL;  Surgeon: Jeet Vale DO;  Location: [facility] Main OR;  Service: Vascular   • OR OPEN IMPLANTATION CRANIAL NERVE VASQUEZ & PULSE GEN N/A 11/10/2021    Procedure: INSERTION UPPER AIRWAY STIMULATOR, INSPIRE IMPLANT;  Surgeon: Nguyễn Morris MD;  Location[facility] Main OR;  Service: ENT   • OR UNLISTED PROCEDURE FOOT/TOES Right 2022    Procedure: 1st metatarsal phalangeal joint fusion;  Surgeon: Tete Lamas DPM;  [facility];  Service: Podiatry   • REDUCTION MAMMAPLASTY Bilateral    • REDUCTION MAMMAPLASTY     • SKIN BIOPSY     • SKIN CANCER EXCISION  2007    squamous cell carcinoma    • SKIN CANCER EXCISION  2007    basal cell carcinoma   • SQUAMOUS CELL CARCINOMA EXCISION     • TOE SURGERY Right 2022    Right Great Toe Fusion   • TONSILLECTOMY     • TUBAL LIGATION     • UPPER GASTROINTESTINAL ENDOSCOPY     • US GUIDED BREAST BIOPSY RIGHT COMPLETE Right 2022     Social history:  Social History     Socioeconomic History   • Marital status: /Civil Union     Spouse name: Not on file   • Number of children: Not on file   • Years of education: Not on file   • Highest education level: Not on file   Occupational History   • Occupation: RETIRED   Tobacco Use   • Smoking status: Former     Packs/day: 2 00     Years: 10 00     Pack years: 20 00     Types: Cigarettes     Start date: 1968     Quit date: 1976     Years since quittin 2   • Smokeless tobacco: Never   • Tobacco comments:     Smoked 2 pack a day   Vaping Use   • Vaping Use: Never used   Substance and Sexual Activity   • Alcohol use: Yes     Comment: 1 or 2 a year   • Drug use: No   • Sexual activity: Not Currently Partners: Male     Birth control/protection: Abstinence, Post-menopausal, Female Sterilization     Comment: defer   Other Topics Concern   • Not on file   Social History Narrative   • Not on file     Social Determinants of Health     Financial Resource Strain: Not on file   Food Insecurity: No Food Insecurity   • Worried About Running Out of Food in the Last Year: Never true   • Ran Out of Food in the Last Year: Never true   Transportation Needs: No Transportation Needs   • Lack of Transportation (Medical): No   • Lack of Transportation (Non-Medical): No   Physical Activity: Not on file   Stress: Not on file   Social Connections: Not on file   Intimate Partner Violence: Not on file   Housing Stability: Unknown   • Unable to Pay for Housing in the Last Year: No   • Number of Places Lived in the Last Year: Not on file   • Unstable Housing in the Last Year: No     Family history:   Family History   Problem Relation Age of Onset   • Heart disease Mother    • Depression Mother    • Hypertension Mother    • COPD Mother    • Hearing loss Mother    • Anxiety disorder Mother    • Heart disease Father    • Lung cancer Father 79        Smoker    • Cancer Father         brain   • Alcohol abuse Father    • Dementia Father    • Hypertension Father    • Thyroid disease Father    • COPD Father    • Arthritis Father    • Brain cancer Father 76   • Hypertension Sister    • Diabetes Sister    • Heart disease Sister    • Thyroid disease Sister    • Cancer Sister         Lympoma, lung   • Lung cancer Sister 78   • Anxiety disorder Sister    • Hypertension Brother    • Diabetes Brother    • Cancer Brother         Throat   • Dementia Brother    • Stroke Brother    • Hypertension Brother    • Heart disease Brother    • Diabetes Brother    • No Known Problems Son    • No Known Problems Son    • Brain cancer Paternal Aunt         unknown age   • Breast cancer Neg Hx        Meds/Allergies   All current active meds have been reviewed  Allergies   Allergen Reactions   • Sulfamethoxazole-Trimethoprim Other (See Comments)     Tongue swelling   • Ciprofloxacin Hives and Itching       Objective   Vitals:    03/23/23 0728   BP: 121/77   Pulse: 71   Resp: 16   Temp: 97 9 °F (36 6 °C)   SpO2: 96%       Intake/Output Summary (Last 24 hours) at 3/23/2023 5147  Last data filed at 3/22/2023 1704  Gross per 24 hour   Intake --   Output 600 ml   Net -600 ml     Invasive Devices     Peripheral Intravenous Line  Duration           Peripheral IV 03/22/23 Right Antecubital <1 day                Physical Exam  Constitutional:       General: She is not in acute distress  Appearance: She is obese  She is ill-appearing  She is not toxic-appearing or diaphoretic  HENT:      Head: Normocephalic  Abrasion, right periorbital erythema and left periorbital erythema present  Eyes:      General: Vision grossly intact  Cardiovascular:      Rate and Rhythm: Normal rate  Heart sounds: Normal heart sounds  Pulmonary:      Breath sounds: Normal breath sounds  Abdominal:      General: There is no distension  Tenderness: There is no abdominal tenderness  Musculoskeletal:      Right lower leg: No edema  Left lower leg: No edema  Skin:     General: Skin is warm  Findings: Bruising present  Neurological:      Mental Status: She is alert and oriented to person, place, and time  Mental status is at baseline  Psychiatric:         Mood and Affect: Mood normal          Lab Results:   I have personally reviewed pertinent lab and imaging results       VTE Prophylaxis: heparin    Code Status: Level 3 - DNAR and DNI  Advance Directive and Living Will: Yes    Power of :    POLST:      Family and Social Support:   Living Arrangements: Lives w/ Spouse/significant other  Support Systems: Spouse/significant other; Children  Assistance Needed: n/a  Type of Current Residence: Private residence  [address]: No

## 2023-03-23 NOTE — ASSESSMENT & PLAN NOTE
· Patient suffered the injury after a mechanical fall from standing  · Significant ecchymoses and swelling surrounding the right eye  · Vision is grossly intact, she is able to move her eye in all directions without any significant tenderness, no evidence of impending compartment syndrome  · Patient given a dose of Ancef  · OMFS consulted and evaluated the patient, no immediate surgical intervention warranted  · Ophthalmology consulted, appreciate evaluation  · Multimodal pain control  · F/u with OMFS in office on Monday, 3/27

## 2023-03-23 NOTE — PROGRESS NOTES
Follow-up dilated fundus examination:    Both eyes were dilated with Mydriacyl and Chito-Synephrine 11:45 AM     The media is clear in both eyes  The optic nerves are pink and flat with physiologic cupping  The macula, vessels and periphery are unremarkable in both eyes  Impression: Periorbital hematoma, orbital fractures, no other ocular pathology noted  Plan: Observation  Okay to discharge home from my standpoint  The patient plans to follow-up with her regular eye care provider next week

## 2023-03-23 NOTE — DISCHARGE INSTR - AVS FIRST PAGE
Rib Fracture Discharge Instructions  Seek medical attention if you develop worsening chest pain or shortness of breath, dizziness/lightheadness, fevers/chills or sweats  No heavy lifting, pushing or pulling >10 pounds and no strenuous physical activity until cleared by trauma  No work or driving while taking narcotic pain medications and until cleared by trauma  Use your incentive spirometer at least hourly while awake  Facial Fracture Instructions  Maintain sinus precautions including no nose blowing, drinking through straws, picking your nose, applying pressure to your face/lying on your face, etc    Follow up with facial surgery and ophthalmology as instructed

## 2023-03-23 NOTE — ASSESSMENT & PLAN NOTE
Lab Results   Component Value Date    EGFR 25 03/22/2023    EGFR 28 02/23/2023    EGFR 27 02/14/2023    CREATININE 1 93 (H) 03/22/2023    CREATININE 1 73 (H) 02/23/2023    CREATININE 1 81 (H) 02/14/2023   • Estimated Creatinine Clearance: 24 6 mL/min (A) (by C-G formula based on SCr of 1 93 mg/dL (H))  • Will attempt to minimize renally excreted pain medications

## 2023-03-23 NOTE — PLAN OF CARE
Problem: PAIN - ADULT  Goal: Verbalizes/displays adequate comfort level or baseline comfort level  Description: Interventions:  - Encourage patient to monitor pain and request assistance  - Assess pain using appropriate pain scale  - Administer analgesics based on type and severity of pain and evaluate response  - Implement non-pharmacological measures as appropriate and evaluate response  - Consider cultural and social influences on pain and pain management  - Notify physician/advanced practitioner if interventions unsuccessful or patient reports new pain  Outcome: Progressing     Problem: INFECTION - ADULT  Goal: Absence or prevention of progression during hospitalization  Description: INTERVENTIONS:  - Assess and monitor for signs and symptoms of infection  - Monitor lab/diagnostic results  - Monitor all insertion sites, i e  indwelling lines, tubes, and drains  - Monitor endotracheal if appropriate and nasal secretions for changes in amount and color  - [place] appropriate cooling/warming therapies per order  - Administer medications as ordered  - Instruct and encourage patient and family to use good hand hygiene technique  - Identify and instruct in appropriate isolation precautions for identified infection/condition  Outcome: Progressing  Goal: Absence of fever/infection during neutropenic period  Description: INTERVENTIONS:  - Monitor WBC    Outcome: Progressing     Problem: SAFETY ADULT  Goal: Patient will remain free of falls  Description: INTERVENTIONS:  - Educate patient/family on patient safety including physical limitations  - Instruct patient to call for assistance with activity   - Consult OT/PT to assist with strengthening/mobility   - Keep Call bell within reach  - Keep bed low and locked with side rails adjusted as appropriate  - Keep care items and personal belongings within reach  - Initiate and maintain comfort rounds  - Make Fall Risk Sign visible to staff  - Offer Toileting every  Hours, in advance of need  - Initiate/Maintain alarm  - Obtain necessary fall risk management equipment:   - Apply yellow socks and bracelet for high fall risk patients  - Consider moving patient to room near nurses station  Outcome: Progressing  Goal: Maintain or return to baseline ADL function  Description: INTERVENTIONS:  -  Assess patient's ability to carry out ADLs; assess patient's baseline for ADL function and identify physical deficits which impact ability to perform ADLs (bathing, care of mouth/teeth, toileting, grooming, dressing, etc )  - Assess/evaluate cause of self-care deficits   - Assess range of motion  - Assess patient's mobility; develop plan if impaired  - Assess patient's need for assistive devices and provide as appropriate  - Encourage maximum independence but intervene and supervise when necessary  - Involve family in performance of ADLs  - Assess for home care needs following discharge   - Consider OT consult to assist with ADL evaluation and planning for discharge  - Provide patient education as appropriate  Outcome: Progressing  Goal: Maintains/Returns to pre admission functional level  Description: INTERVENTIONS:  - Perform BMAT or MOVE assessment daily    - Set and communicate daily mobility goal to care team and patient/family/caregiver  - Collaborate with rehabilitation services on mobility goals if consulted  - Perform Range of Motion 3 times a day  - Reposition patient every 3 hours    - Dangle patient 3 times a day  - Stand patient 3 times a day  - Ambulate patient 3 times a day  - Out of bed to chair 3 times a day   - Out of bed for meals 3 times a day  - Out of bed for toileting  - Record patient progress and toleration of activity level   Outcome: Progressing

## 2023-03-23 NOTE — PLAN OF CARE
Problem: PAIN - ADULT  Goal: Verbalizes/displays adequate comfort level or baseline comfort level  Description: Interventions:  - Encourage patient to monitor pain and request assistance  - Assess pain using appropriate pain scale  - Administer analgesics based on type and severity of pain and evaluate response  - Implement non-pharmacological measures as appropriate and evaluate response  - Consider cultural and social influences on pain and pain management  - Notify physician/advanced practitioner if interventions unsuccessful or patient reports new pain  Outcome: Progressing     Problem: INFECTION - ADULT  Goal: Absence or prevention of progression during hospitalization  Description: INTERVENTIONS:  - Assess and monitor for signs and symptoms of infection  - Monitor lab/diagnostic results  - Monitor all insertion sites, i e  indwelling lines, tubes, and drains  - Monitor endotracheal if appropriate and nasal secretions for changes in amount and color  - [place] appropriate cooling/warming therapies per order  - Administer medications as ordered  - Instruct and encourage patient and family to use good hand hygiene technique  - Identify and instruct in appropriate isolation precautions for identified infection/condition  Outcome: Progressing  Goal: Absence of fever/infection during neutropenic period  Description: INTERVENTIONS:  - Monitor WBC    Outcome: Progressing    Goal: Maintain or return to baseline ADL function  Description: INTERVENTIONS:  -  Assess patient's ability to carry out ADLs; assess patient's baseline for ADL function and identify physical deficits which impact ability to perform ADLs (bathing, care of mouth/teeth, toileting, grooming, dressing, etc )  - Assess/evaluate cause of self-care deficits   - Assess range of motion  - Assess patient's mobility; develop plan if impaired  - Assess patient's need for assistive devices and provide as appropriate  - Encourage maximum independence but intervene and supervise when necessary  - Involve family in performance of ADLs  - Assess for home care needs following discharge   - Consider OT consult to assist with ADL evaluation and planning for discharge  - Provide patient education as appropriate  Outcome: Progressing     Problem: DISCHARGE PLANNING  Goal: Discharge to home or other facility with appropriate resources  Description: INTERVENTIONS:  - Identify barriers to discharge w/patient and caregiver  - Arrange for needed discharge resources and transportation as appropriate  - Identify discharge learning needs (meds, wound care, etc )  - Arrange for interpretive services to assist at discharge as needed  - Refer to Case Management Department for coordinating discharge planning if the patient needs post-hospital services based on physician/advanced practitioner order or complex needs related to functional status, cognitive ability, or social support system  Outcome: Progressing     Problem: Knowledge Deficit  Goal: Patient/family/caregiver demonstrates understanding of disease process, treatment plan, medications, and discharge instructions  Description: Complete learning assessment and assess knowledge base    Interventions:  - Provide teaching at level of understanding  - Provide teaching via preferred learning methods  Outcome: Progressing

## 2023-03-23 NOTE — ASSESSMENT & PLAN NOTE
· Buckle fractures of right ribs 7 and 8  · Continue rib fracture protocol  · Encourage incentive spirometry  · APS consulted, appreciate recommendations  · Multimodal pain regimen  · PIC score 7  Pulling 1250 mls on IS  · F/u with trauma in 2 weeks

## 2023-03-23 NOTE — CONSULTS
[facility]  Consult note  Name: Nerissa Bledsoe  MRN: [id number]  Unit/Bed#: W -01 I Date of Admission: 3/22/2023   Date of Service: 3/23/2023 I Hospital Day: 1    Assessment/Plan   Multiple closed fractures of ribs of right side  Assessment & Plan  · Patient with right seventh and eighth "buckle" rib fractures per imaging  · Complains of right chest wall pain which worsens with deep inspiration, movement and coughing  · Able to pull 1250 mL on incentive spirometry consistently in my presence  · Patient with no supplemental oxygen requirement, no evidence of respiratory distress and with normal oxygen saturations  · Patient relates that she has a history of BARBARA but has not used her BiPAP for several months  · Due to patient's history of BARBARA and COPD, patient at risk of respiratory decompensation  Discussed placement of thoracic PCEA or peripheral nerve block (paravertebral, HUSAM, costal block)  Patient declined stating that she feels that medication will be effective enough  · Continue Tylenol 975 mg p o  every 8 hours scheduled  · Continue Cymbalta 60 mg p o  daily scheduled  · Continue gabapentin 100 mg p o  at bedtime scheduled (would not increase this as patient has had side effects with higher doses in the past)  · Discontinue oxycodone  · Start Dilaudid 1 mg p o  every 4 hours as needed moderate pain or 2 mg p o  every 4 hours  Severe pain  · Continue Dilaudid 0 2 mg IV every 4 hours as needed breakthrough pain  · Suggest scheduled bowel regimen while on opioid pain medication to avoid opioid-induced constipation  · Encourage activity and mobility to avoid muscle spasm and weakness which can lead to worsening pain  · Ice to painful area for up to 20 minutes every hour as needed  Can change to moist heat if this is more effective      COPD (chronic obstructive pulmonary disease) (HCC)  Assessment & Plan  • Patients with underlying pulmonary disease are at higher risk for respiratory compromise with opioid pain medications  • Monitor respiratory status closely and minimize use of opioids as possible  Depression, recurrent ([facility] )  Assessment & Plan  • Patients with depression and/or anxiety have more perceived pain on average and often require higher doses of opioid pain medication  • Treat depression and/or anxiety aggressively  Fibromyalgia  Assessment & Plan  · History of chronic pain secondary to this  · Not on chronic opioids  BARBARA (obstructive sleep apnea)  Assessment & Plan  • Patient's with sleep apnea are at higher risk of respiratory depression secondary to opioid use  • Attempt to minimize use of opioid pain medication while maintaining adequate analgesia  Stage 4 chronic kidney disease [facility])  Assessment & Plan  Lab Results   Component Value Date    EGFR 25 03/22/2023    EGFR 28 02/23/2023    EGFR 27 02/14/2023    CREATININE 1 93 (H) 03/22/2023    CREATININE 1 73 (H) 02/23/2023    CREATININE 1 81 (H) 02/14/2023   • Estimated Creatinine Clearance: 24 6 mL/min (A) (by C-G formula based on SCr of 1 93 mg/dL (H))  • Will attempt to minimize renally excreted pain medications  * Right orbital fracture, closed, initial encounter ([facility] )  Assessment & Plan  · Complains of tolerable facial pain  Celeste Duenas is a 67 y o  female status post fall with right orbital fracture and right seventh and eighth rib fractures  APS will continue to follow  Please contact Acute Pain Service - [facility] via [employer] from [phone] with additional questions or concerns  See TigerText or Chidi for additional contacts and after hours information      History of Present Illness    Admit Date:  3/22/2023  Hospital Day:  1 day  Primary Service:  Trauma  Attending Provider:  Beronica Garcia DO  Reason for Consult / Principal Problem: Right chest wall pain  HPI: Celeste Duenas is a 67 y o  female who presents with right chest wall pain and facial pain following a fall   Patient had a mechanical fall resulting in right orbital fracture as well as right seventh and eighth rib buckle fractures  Patient with a history of underlying pulmonary and renal disease and requiring 1 L nasal cannula oxygen overnight for decreased oxygen saturations  Currently sitting up in a chair with no complaints of respiratory distress, on room air with normal oxygen saturations and able to pull 1250 mL on I-S consistently in my presence  Currently complains of right chest wall pain as well as some facial pain  Patient states that the IV Dilaudid has been somewhat effective but that oxycodone has not been effective at all  Current pain location(s): Right chest wall  Pain Scale:   2-7 out of 10  Quality: Aching at rest, sharp with movement   Current Analgesic regimen:  Tylenol 975 mg p o  every 8 hours scheduled  Oxycodone 2 5 mg p o  every 4 hours as needed moderate pain  Oxycodone 5 mg p o  every 4 hours  Severe pain  Dilaudid 0 2 mg IV every 4 hours as needed breakthrough pain  Cymbalta 60 mg p o  daily scheduled  Gabapentin 100 mg p o  at bedtime scheduled  Pain History: Chronic generalized pain, fibromyalgia  Pain Management Provider: None    I have reviewed the patient's controlled substance dispensing history in the Prescription Drug Monitoring Program in compliance with the [facility] regulations before prescribing any controlled substances  Consults    Review of Systems   Respiratory: Negative for cough and shortness of breath  Cardiovascular: Positive for chest pain  All other systems reviewed and are negative        Historical Information   Past Medical History:   Diagnosis Date   • Allergic    • Allergic rhinitis    • Anesthesia     pt reports "had to use double lumen endo tube for hiatal hernia repair/so surgeon could get to where he needed to work"   • Arthritis    • Aspiration into airway    • Basal cell carcinoma 2007    left cheek    • BCC (basal cell carcinoma) 05/27/2021    Left Nasal tip   • Cancer (HCC)     squamous cell cancer on forhead   • Cancer (HCC)     basal cell on nose    • Chronic kidney disease 2000, 2018    Stones, kidney disease stage 4   • Chronic pain disorder     bilat feet and joint pain on occas   • Colon polyp    • Constipation    • COPD (chronic obstructive pulmonary disease) ([facility] )    • COVID-19 08/2021    recovered at home/did receive monoclonal infusion   • Dental bridge present    • Depression    • Diabetes mellitus ([facility] )     Type 2   • Diabetic neuropathy ([facility] )    • Disease of thyroid gland    • Family history of thyroid problem    • Fatty liver    • GERD (gastroesophageal reflux disease)    • Heart burn    • Hiatal hernia    • History of pneumonia    • Hyperlipidemia    • Hyperplasia, parathyroid ([facility] )    • Hypertension    • Kidney problem    • Kidney stone    • Memory loss Julu2 2020   • Motion sickness    • Motion sickness    • Neck pain    • Obesity 1978   • Obesity (BMI 30 0-34  9)    • Pollen allergies    • RLS (restless legs syndrome)    • SCC (squamous cell carcinoma) 05/04/2021    left mid forehead   • Seasonal allergies    • Sleep apnea     has inspire   • Squamous cell skin cancer 2007    left cheek    • Swollen ankles    • 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, left     great toe fusion   • Urinary incontinence    • Urinary tract infection 03/28/2022   • Wears glasses      Past Surgical History:   Procedure Laterality Date   • ARTHROSCOPY KNEE     • BREAST BIOPSY      stereotactic-benign   • BREAST BIOPSY      stereo-benign   • BREAST EXCISIONAL BIOPSY      unknown date-benign   • BREAST EXCISIONAL BIOPSY      unknown date-benign   • BREAST EXCISIONAL BIOPSY      unknown date-benign   • BREAST EXCISIONAL BIOPSY      unknown age-benign   • CARDIAC CATHETERIZATION     • CHOLECYSTECTOMY     • COLONOSCOPY     • EXAMINATION UNDER ANESTHESIA N/A 06/24/2021    Procedure: EXAM UNDER ANESTHESIA (EUA), DISE;  Surgeon: Priscilla Montoya MD; [facility];  Service: ENT   • HERNIA REPAIR     • HIATAL HERNIA REPAIR     • KNEE SURGERY      Torn maniscus lap surg   • LIPOSUCTION     • LYMPH NODE BIOPSY     • MOHS SURGERY  2021    left mid forehead-Gautam   • MOHS SURGERY Left 2021    Left nasal tip- gautam    • MO LIGATION/BIOPSY TEMPORAL ARTERY Left 2023    Procedure: BIOPSY ARTERY TEMPORAL;  Surgeon: Ab Odell DO;  [facility];  Service: Vascular   • MO OPEN IMPLANTATION CRANIAL NERVE VASQUEZ & PULSE GEN N/A 11/10/2021    Procedure: INSERTION UPPER AIRWAY STIMULATOR, INSPIRE IMPLANT;  Surgeon: Kan Dawn MD;  Location: [facility];  Service: ENT   • MO UNLISTED PROCEDURE FOOT/TOES Right 2022    Procedure: 1st metatarsal phalangeal joint fusion;  Surgeon: Mame Staples DPM;  Location[facility];  Service: Podiatry   • REDUCTION MAMMAPLASTY Bilateral    • REDUCTION MAMMAPLASTY     • SKIN BIOPSY     • SKIN CANCER EXCISION  2007    squamous cell carcinoma    • SKIN CANCER EXCISION  2007    basal cell carcinoma   • SQUAMOUS CELL CARCINOMA EXCISION     • TOE SURGERY Right 2022    Right Great Toe Fusion   • TONSILLECTOMY     • TUBAL LIGATION     • UPPER GASTROINTESTINAL ENDOSCOPY     • US GUIDED BREAST BIOPSY RIGHT COMPLETE Right 2022     Social History   Social History     Substance and Sexual Activity   Alcohol Use Yes    Comment: 1 or 2 a year     Social History     Substance and Sexual Activity   Drug Use No     Social History     Tobacco Use   Smoking Status Former   • Packs/day: 2 00   • Years: 10 00   • Pack years: 20 00   • Types: Cigarettes   • Start date: 1968   • Quit date: 1976   • Years since quittin 2   Smokeless Tobacco Never   Tobacco Comments    Smoked 2 pack a day     Family History:   Family History   Problem Relation Age of Onset   • Heart disease Mother    • Depression Mother    • Hypertension Mother    • COPD Mother    • Hearing loss Mother    • Anxiety disorder Mother    • Heart disease Father    • Lung cancer Father 79        Smoker    • Cancer Father         brain   • Alcohol abuse Father    • Dementia Father    • Hypertension Father    • Thyroid disease Father    • COPD Father    • Arthritis Father    • Brain cancer Father 76   • Hypertension Sister    • Diabetes Sister    • Heart disease Sister    • Thyroid disease Sister    • Cancer Sister         Lympoma, lung   • Lung cancer Sister 78   • Anxiety disorder Sister    • Hypertension Brother    • Diabetes Brother    • Cancer Brother         Throat   • Dementia Brother    • Stroke Brother    • Hypertension Brother    • Heart disease Brother    • Diabetes Brother    • No Known Problems Son    • No Known Problems Son    • Brain cancer Paternal Aunt         unknown age   • Breast cancer Neg Hx        Meds/Allergies   all current active meds have been reviewed, current meds:   Current Facility-Administered Medications   Medication Dose Route Frequency   • acetaminophen (TYLENOL) tablet 975 mg  975 mg Oral Q8H [address]   • albuterol (PROVENTIL HFA,VENTOLIN HFA) inhaler 2 puff  2 puff Inhalation Q6H PRN   • amLODIPine (NORVASC) tablet 5 mg  5 mg Oral Daily   • ampicillin-sulbactam (UNASYN) 3 g in sodium chloride 0 9 % 100 mL IVPB  3 g Intravenous Once   • calcium carbonate (OYSTER SHELL,OSCAL) 500 mg tablet 2 tablet  2 tablet Oral Daily With Breakfast   • DULoxetine (CYMBALTA) delayed release capsule 60 mg  60 mg Oral Daily   • fluticasone (FLONASE) 50 mcg/act nasal spray 1 spray  1 spray Each Nare 4x Daily PRN   • gabapentin (NEURONTIN) capsule 100 mg  100 mg Oral HS   • heparin (porcine) subcutaneous injection 5,000 Units  5,000 Units Subcutaneous Q8H [address]   • HYDROmorphone (DILAUDID) tablet 1 mg  1 mg Oral Q4H PRN    Or   • HYDROmorphone (DILAUDID) tablet 2 mg  2 mg Oral Q4H PRN   • HYDROmorphone HCl (DILAUDID) injection 0 2 mg  0 2 mg Intravenous Q4H PRN   • insulin detemir (LEVEMIR) subcutaneous injection 50 Units  50 Units Subcutaneous QPM   • insulin lispro (HumaLOG) 100 units/mL subcutaneous injection 16 Units  16 Units Subcutaneous BID With Meals   • insulin lispro (HumaLOG) 100 units/mL subcutaneous injection 25 Units  25 Units Subcutaneous Daily With Dinner   • levothyroxine tablet 112 mcg  112 mcg Oral Early Morning   • losartan (COZAAR) tablet 100 mg  100 mg Oral Daily   • metoprolol tartrate (LOPRESSOR) tablet 50 mg  50 mg Oral Q12H [address]   • multivitamin stress formula tablet 1 tablet  1 tablet Oral Daily   • ondansetron (ZOFRAN) oral solution 4 mg  4 mg Oral Once   • pantoprazole (PROTONIX) EC tablet 40 mg  40 mg Oral BID   • pramipexole (MIRAPEX) tablet 0 25 mg  0 25 mg Oral HS   • sodium chloride (OCEAN) 0 65 % nasal spray 1 spray  1 spray Each Nare Q1H PRN   • spironolactone (ALDACTONE) tablet 25 mg  25 mg Oral Daily   • torsemide (DEMADEX) tablet 20 mg  20 mg Oral Daily    and PTA meds:   Prior to Admission Medications   Prescriptions Last Dose Informant Patient Reported? Taking?    B-D ULTRAFINE III SHORT PEN 31G X 8 MM MISC   Yes No   Sig: USE AS DIRECTED 4 TIMES PER DAY   Calcium Citrate 1040 MG TABS   Yes No   Sig: Take 500 mg by mouth Three times a day   DULoxetine (CYMBALTA) 30 mg delayed release capsule   Yes No   Sig: Take 60 mg by mouth daily   Probiotic Product (PROBIOTIC PO)   Yes No   Sig: Take 1 capsule by mouth 2 (two) times a day   Vitamin D, Ergocalciferol, 2000 units CAPS   Yes No   Sig: Take 2,000 Units by mouth daily    acetaminophen (TYLENOL) 325 mg tablet   No No   Sig: Take 2 tablets (650 mg total) by mouth every 6 (six) hours as needed for mild pain, headaches or fever   albuterol (Ventolin HFA) 90 mcg/act inhaler   No No   Sig: Inhale 2 puffs every 6 (six) hours as needed for wheezing   amLODIPine (NORVASC) 5 mg tablet   No No   Sig: Take 1 tablet (5 mg total) by mouth daily   b complex vitamins capsule   Yes No   Sig: Take 1 capsule by mouth daily   gabapentin (Neurontin) 300 mg capsule   No No   Sig: Take one cap PO in the morning and afternoon and two caps PO in the evening   insulin aspart (NovoLOG) 100 units/mL injection   Yes No   Sig: Inject under the skin 3 (three) times a day before meals 16 units, 16 units, 25 units  insulin detemir (Levemir FlexTouch) 100 Units/mL injection pen   Yes No   Sig: Inject 50 Units under the skin every evening    levothyroxine 112 mcg tablet   Yes No   Sig: Take 112 mcg by mouth every morning   metoprolol tartrate (LOPRESSOR) 50 mg tablet   No No   Sig: TAKE 1 TABLET (50 MG TOTAL) BY MOUTH EVERY 12 (TWELVE) HOURS   olmesartan (BENICAR) 40 mg tablet   No No   Sig: Take 1 tablet (40 mg total) by mouth daily at bedtime   pantoprazole (PROTONIX) 40 mg tablet   No No   Sig: TAKE 1 TABLET BY MOUTH TWICE A DAY   pramipexole (MIRAPEX) 0 25 mg tablet   No No   Sig: Take 1 tablet (0 25 mg total) by mouth daily at bedtime   rosuvastatin (CRESTOR) 20 MG tablet   No No   Sig: Take 1 tablet (20 mg total) by mouth daily   Patient not taking: Reported on 2/13/2023   spironolactone (ALDACTONE) 25 mg tablet   No No   Sig: Take 1 tablet (25 mg total) by mouth daily   torsemide (DEMADEX) 20 mg tablet   No No   Sig: TAKE 1 TABLET BY MOUTH EVERY DAY      Facility-Administered Medications: None       Allergies   Allergen Reactions   • Sulfamethoxazole-Trimethoprim Other (See Comments)     Tongue swelling   • Ciprofloxacin Hives and Itching       Objective   Temp:  [97 9 °F (36 6 °C)-98 °F (36 7 °C)] 97 9 °F (36 6 °C)  HR:  [70-78] 71  Resp:  [16-18] 16  BP: (120-182)/(77-94) 121/77    Intake/Output Summary (Last 24 hours) at 3/23/2023 1310  Last data filed at 3/23/2023 0900  Gross per 24 hour   Intake 360 ml   Output 600 ml   Net -240 ml       Physical Exam  Vitals and nursing note reviewed  Constitutional:       General: She is awake  She is not in acute distress  Appearance: She is not ill-appearing, toxic-appearing or diaphoretic     Cardiovascular:      Rate and Rhythm: Normal rate and regular rhythm  Pulmonary:      Effort: Pulmonary effort is normal  No respiratory distress  Breath sounds: Normal breath sounds  Chest:       Skin:     General: Skin is warm and dry  Neurological:      Mental Status: She is alert and oriented to person, place, and time  GCS: GCS eye subscore is 4  GCS verbal subscore is 5  GCS motor subscore is 6  Psychiatric:         Attention and Perception: Attention normal          Speech: Speech normal          Behavior: Behavior normal  Behavior is cooperative  Lab Results:   I have personally reviewed pertinent labs  , CBC:   Lab Results   Component Value Date    WBC 13 07 (H) 03/22/2023    HGB 14 1 03/22/2023    HCT 43 5 03/22/2023    MCV 90 03/22/2023     03/22/2023     03/22/2023    MCH 29 3 03/22/2023    MCHC 32 4 03/22/2023    RDW 14 6 03/22/2023    MPV 11 3 03/22/2023    MPV 11 2 03/22/2023    NRBC 0 03/22/2023   , CMP:   Lab Results   Component Value Date    SODIUM 139 03/22/2023    K 3 5 03/22/2023    CL 98 03/22/2023    CO2 32 03/22/2023    BUN 36 (H) 03/22/2023    CREATININE 1 93 (H) 03/22/2023    CALCIUM 9 7 03/22/2023    AST 18 03/22/2023    ALT 21 03/22/2023    ALKPHOS 57 03/22/2023    EGFR 25 03/22/2023   , BMP:  Lab Results   Component Value Date    SODIUM 139 03/22/2023    K 3 5 03/22/2023    CL 98 03/22/2023    CO2 32 03/22/2023    BUN 36 (H) 03/22/2023    CREATININE 1 93 (H) 03/22/2023    GLUC 153 (H) 03/22/2023    CALCIUM 9 7 03/22/2023    AGAP 9 03/22/2023    EGFR 25 03/22/2023   , PT/PTT:  Lab Results   Component Value Date    PTT 24 03/23/2023    Estimated Creatinine Clearance: 24 6 mL/min (A) (by C-G formula based on SCr of 1 93 mg/dL (H))  Imaging Studies: I have personally reviewed pertinent reports  EKG, Pathology, and Other Studies: I have personally reviewed pertinent reports        Counseling / Coordination of Care  Greater than 50% of total time was spent with the patient and / or family counseling and / or coordination of care  A description of the counseling / coordination of care:  Patient interview, physical examination, review of PDMP, review of medical record, review of imaging and laboratory data, development of pain management plan, discussion of pain management plan with patient and primary service  Please note that the APS provides consultative services regarding pain management only  With the exception of ketamine and epidural infusions and except when indicated, final decisions regarding starting or changing doses of analgesic medications are at the discretion of the consulting service  Off hours consultation and/or medication management is generally not available      Ruth Ann Moseley PA-C  Acute Pain Service

## 2023-03-23 NOTE — ASSESSMENT & PLAN NOTE
· Patient with right seventh and eighth "buckle" rib fractures per imaging  · Complains of right chest wall pain which worsens with deep inspiration, movement and coughing  · Able to pull 1250 mL on incentive spirometry consistently in my presence  · Patient with no supplemental oxygen requirement, no evidence of respiratory distress and with normal oxygen saturations  · Patient relates that she has a history of BARBARA but has not used her BiPAP for several months  · Due to patient's history of BARBARA and COPD, patient at risk of respiratory decompensation  Discussed placement of thoracic PCEA or peripheral nerve block (paravertebral, HUSAM, costal block)  Patient declined stating that she feels that medication will be effective enough  · Continue Tylenol 975 mg p o  every 8 hours scheduled  · Continue Cymbalta 60 mg p o  daily scheduled  · Continue gabapentin 100 mg p o  at bedtime scheduled (would not increase this as patient has had side effects with higher doses in the past)  · Discontinue oxycodone  · Start Dilaudid 1 mg p o  every 4 hours as needed moderate pain or 2 mg p o  every 4 hours  Severe pain  · Continue Dilaudid 0 2 mg IV every 4 hours as needed breakthrough pain  · Suggest scheduled bowel regimen while on opioid pain medication to avoid opioid-induced constipation  · Encourage activity and mobility to avoid muscle spasm and weakness which can lead to worsening pain  · Ice to painful area for up to 20 minutes every hour as needed  Can change to moist heat if this is more effective

## 2023-03-23 NOTE — ASSESSMENT & PLAN NOTE
Lab Results   Component Value Date    EGFR 25 03/22/2023    EGFR 28 02/23/2023    EGFR 27 02/14/2023    CREATININE 1 93 (H) 03/22/2023    CREATININE 1 73 (H) 02/23/2023    CREATININE 1 81 (H) 02/14/2023     · Baseline creatinine between 1 7 and 1 9  · Avoid nephrotoxic medications  · F/u with PCP

## 2023-03-23 NOTE — ASSESSMENT & PLAN NOTE
Lab Results   Component Value Date    HGBA1C 6 8 (H) 03/07/2023       Recent Labs     03/22/23  2113 03/23/23  0726 03/23/23  1114   POCGLU 251* 234* 222*       Blood Sugar Average: Last 72 hrs:  (P) 345 [id number]   · Continue with home insulin regimen  · Hypoglycemia protocol  · Frequent glucose checks

## 2023-03-23 NOTE — ASSESSMENT & PLAN NOTE
· Patient has baseline ambulatory dysfunction  · Patient had a mechanical fall resulting in the injuries described below  · PT/OT - no rehab needs  Has a RW at home  Will d/c home with her

## 2023-03-23 NOTE — ASSESSMENT & PLAN NOTE
• Patient's with sleep apnea are at higher risk of respiratory depression secondary to opioid use  • Attempt to minimize use of opioid pain medication while maintaining adequate analgesia

## 2023-03-23 NOTE — DISCHARGE SUMMARY
[facility] Trauma Survey/Discharge- Celeste Salvage 1950, 67 y o  female MRN: [id number]  Unit/Bed#: W MS [facility]-43 Encounter: [id number]  Primary Care Provider: Natalia Murphy MD   Date and time admitted to hospital: 3/22/2023  2:58 PM    [address]  · Fall resulting in the described injuries below  · Baseline ambulatory dysfunction  · PT/OT recommends no rehab needs  · Gerontology consulted  · Discharge home today    Multiple closed fractures of ribs of right side  Assessment & Plan  · Buckle fractures of right ribs 7 and 8  · Continue rib fracture protocol  · Encourage incentive spirometry  · APS consulted, appreciate recommendations  · Multimodal pain regimen  · PIC score 7  Pulling 1250 mls on IS  · F/u with trauma in 2 weeks  * Right orbital fracture, closed, initial encounter [facility])  Assessment & Plan  · Patient suffered the injury after a mechanical fall from standing  · Significant ecchymoses and swelling surrounding the right eye  · Vision is grossly intact, she is able to move her eye in all directions without any significant tenderness, no evidence of impending compartment syndrome  · Patient given a dose of Ancef  · OMFS consulted and evaluated the patient, no immediate surgical intervention warranted  · Ophthalmology consulted, appreciate evaluation  · Multimodal pain control  · F/u with OMFS in office on Monday, 3/27  Nasal bones, closed fracture  Assessment & Plan  · Patient suffered the injury after a mechanical fall from standing  · Patient given a dose of Ancef  · OMFS evaluated the patient, no immediate surgical intervention warranted  · Multimodal pain control  · F/u with OMFS in office on Monday, 3/27       Facial abrasion, initial encounter  Assessment & Plan  · Several small facial abrasions after a fall from standing  · Glue applied to the abrasions    Hypothyroid  Assessment & Plan  · Continue with home dose levothyroxine    Depression, recurrent ([facility] [address] )  Assessment & Plan  · Continue with home Cymbalta    Ambulatory dysfunction  Assessment & Plan  · Patient has baseline ambulatory dysfunction  · Patient had a mechanical fall resulting in the injuries described below  · PT/OT - no rehab needs  Has a RW at home  Will d/c home with her   HTN (hypertension)  Assessment & Plan  · Continue with home Norvasc, Lopressor, losartan, spironolactone, torsemide    Type 2 diabetes mellitus with diabetic nephropathy, with long-term current use of insulin [facility])  Assessment & Plan  Lab Results   Component Value Date    HGBA1C 6 8 (H) 03/07/2023       Recent Labs     03/22/23  2113 03/23/23  0726 03/23/23  1114   POCGLU 251* 234* 222*       Blood Sugar Average: Last 72 hrs:  (P) 105 [id number]   · Continue with home insulin regimen  · Hypoglycemia protocol  · Frequent glucose checks    Stage 4 chronic kidney disease [facility])  Assessment & Plan  Lab Results   Component Value Date    EGFR 25 03/22/2023    EGFR 28 02/23/2023    EGFR 27 02/14/2023    CREATININE 1 93 (H) 03/22/2023    CREATININE 1 73 (H) 02/23/2023    CREATININE 1 81 (H) 02/14/2023     · Baseline creatinine between 1 7 and 1 9  · Avoid nephrotoxic medications  · F/u with PCP  Medical Problems     Resolved Problems  Date Reviewed: 3/23/2023   None         Admission Date:   Admission Orders (From admission, onward)     Ordered        03/22/23 1730  Inpatient Admission  Once                        Admitting Diagnosis: Facial contusion [S00 83XA]  Right orbital fracture ([email or web address] ) [S02 85XA]  Nasal bone fracture [S02  2XXA]  Closed fracture of nasal bone, initial encounter [S02  2XXA]  Injury of head, initial encounter [S09 90XA]  Right orbit fracture, closed, initial encounter ([facility] [address] ) [S02 85XA]    HPI: As documented by Dr Leslie Chandler who evaluated the patient on admission, "Kwesi Wigigns is a 67 y o  female who presents with a right orbital fracture, bilateral nasal bone fractures, after a mechanical fall from standing, she was walking her dog when her dog accidentally pulled her down  She does not take any blood thinners, no aspirin  Patient endorses a moderate amount of pain over her right orbit, there is a large amount of swelling as well and she has difficulty opening her eye  Vision is grossly intact, she is able to complete all eye movements without any significant tenderness  She denies any headache, dizziness, confusion, nausea or vomiting, abdominal tenderness, significant pain in her extremities  Patient to be admitted to the hospital for further treatment evaluation "    Procedures Performed: No orders of the defined types were placed in this encounter  Summary of Hospital Course: Patient was placed on the trauma service s/p fall while walking her dog when she sustained an orbital fracture and nasal bone fractures as well as R 7th/8th rib fractures  She was seen by OMFS who recommends non-operative management  She is to follow up with them in the office on Monday, 3/27  She is to maintain sinus precautions  She was seen by Ophthalmology, Dr Hailey Alexandre, who dilated her eye on 3/23 and found a normal exam  She was up and ambulatory without difficulty  She was pulling 1250 mls on IS and her rib pain was controlled  She was seen by APS and declined peripheral block or EDC  She is medically stable for discharge  She is to follow up with trauma in 2 weeks for her rib fractures and is to follow up with OMFS on Monday, 3/27  She is to follow up with her own eye doctor next week  Today she is feeling well  Her pain is controlled  She denies visual complaints, dizziness/lighthaededness  She has no new areas of pain on teritary exam     Exam:  Vitals:    03/23/23 0728   BP: 121/77   Pulse: 71   Resp: 16   Temp: 97 9 °F (36 6 °C)   SpO2: 96%     GEN: NAD  HEENT: + bilateral periorbital ecchymosis and edema; + EOMI bilaterally; + PERRL bilaterally   + VA intact to CF    NEURO: GCS 15,non-focal  CV: RRR, no MGR  PULM: CTA bilaterally  GI: soft,non-tender,non-distended  : voiding  MSK: no edema, contusion or deformity; + R anterior chest wall tenderess  SKIN: pink, warm, dry       Significant Findings, Care, Treatment and Services Provided:   CT head without contrast    Result Date: 3/22/2023  Impression: No intracranial hemorrhage or calvarial fracture  CT facial bones is reported separately  Workstation performed: [id number]     CT facial bones without contrast    Result Date: 3/22/2023  Impression: Prominently displaced fracture of the inferior wall of the right orbit without intraorbital muscular entrapment  Acute mildly displaced bilateral nasal bone fractures and increased leftward nasal septal deviation  Right maxillary sinus hemorrhage  The study was marked in [facility] for immediate notification  Workstation performed: [id number]     CT chest wo contrast    Result Date: 3/22/2023  Impression: Right anterior 7th and 8th rib buckle fractures  No other acute abnormality  Incidental diffuse dilated fluid-filled esophagus with moderate hiatal hernia  Workstation performed: [id number]       Complications: none    Condition at Discharge: good         Discharge instructions/Information to patient and family:   See after visit summary for information provided to patient and family  Provisions for Follow-Up Care:  See after visit summary for information related to follow-up care and any pertinent home health orders  PCP: Theodore Wall MD    Disposition: Home    Planned Readmission: No    Discharge Statement   I spent 25 minutes discharging the patient  This time was spent on the day of discharge  I had direct contact with the patient on the day of discharge  Additional documentation is required if more than 30 minutes were spent on discharge  Discharge Medications:  See after visit summary for reconciled discharge medications provided to patient and family

## 2023-03-23 NOTE — PHYSICAL THERAPY NOTE
PHYSICAL THERAPY SCREEN NOTE          Patient Name: Kb Matthew  CYTGZ'A Date: 3/23/2023           03/23/23 9120   Note Type   Note type Screen   Additional Comments PT orders received, chart review performed  Spoke to JAYSHREE Sheffield who reports pt has been mobilizing independently w/in room  Entered pt's room to educate pt on role of PT in acute care to assess mobility and assist w/ DC recommendation  Pt reports she has been ambulating w/ the RW and has no concerns regarding mobility upon DC  Pt reports pt has a RW available in home environment and she was following w/ OPPT PTA  Will screen and DC pt from Inpatient PT caseload due to pt w/o acute PT needs as pt appears to be mobilizing at baseline level  Please re-consult PT if needs arise           Alda Wallace, PT, DPT  03/23/23

## 2023-03-23 NOTE — OCCUPATIONAL THERAPY NOTE
Occupational Therapy Screen Note         Patient Name: Shelly CHRISTENSEN Date: 3/23/2023         03/23/23 9150   OT Last Visit   OT Visit Date 03/23/23   Note Type   Note type Screen   Additional Comments OT orders received  Chart review performed  Based on conversation with JAYSHREE Mcfarland, pt appears to be performing at functional baseline  Pt has been (I) in room and getting to/from bathroom without assistance  OT performed screen and discussed results with patient  Pt does not demonstrate need for skilled OT at this time  Pt will not be seen further by OT services  DC OT orders  If pt's functional status declines please re-consult       Parisa Mustafa, MS, OTR/L

## 2023-03-23 NOTE — ASSESSMENT & PLAN NOTE
· Fall resulting in the described injuries below  · Baseline ambulatory dysfunction  · PT/OT recommends no rehab needs  · Gerontology consulted  · Discharge home today

## 2023-03-23 NOTE — ASSESSMENT & PLAN NOTE
• Patients with underlying pulmonary disease are at higher risk for respiratory compromise with opioid pain medications  • Monitor respiratory status closely and minimize use of opioids as possible

## 2023-03-24 ENCOUNTER — NURSE TRIAGE (OUTPATIENT)
Dept: OTHER | Facility: OTHER | Age: 73
End: 2023-03-24

## 2023-03-24 ENCOUNTER — TRANSITIONAL CARE MANAGEMENT (OUTPATIENT)
Dept: FAMILY MEDICINE CLINIC | Facility: CLINIC | Age: 73
End: 2023-03-24

## 2023-03-24 ENCOUNTER — HOSPITAL ENCOUNTER (INPATIENT)
Facility: HOSPITAL | Age: 73
LOS: 2 days | Discharge: HOME | End: 2023-03-27
Attending: EMERGENCY MEDICINE | Admitting: SURGERY

## 2023-03-24 ENCOUNTER — APPOINTMENT (EMERGENCY)
Dept: RADIOLOGY | Facility: HOSPITAL | Age: 73
End: 2023-03-24

## 2023-03-24 DIAGNOSIS — R09.02 HYPOXEMIA: ICD-10-CM

## 2023-03-24 DIAGNOSIS — N18.4 STAGE 4 CHRONIC KIDNEY DISEASE (HCC): ICD-10-CM

## 2023-03-24 DIAGNOSIS — R06.09 DYSPNEA ON EXERTION: ICD-10-CM

## 2023-03-24 DIAGNOSIS — N30.00 ACUTE CYSTITIS WITHOUT HEMATURIA: ICD-10-CM

## 2023-03-24 DIAGNOSIS — S22.41XD CLOSED FRACTURE OF MULTIPLE RIBS OF RIGHT SIDE WITH ROUTINE HEALING, SUBSEQUENT ENCOUNTER: Primary | ICD-10-CM

## 2023-03-24 DIAGNOSIS — S22.41XA CLOSED FRACTURE OF MULTIPLE RIBS OF RIGHT SIDE, INITIAL ENCOUNTER: ICD-10-CM

## 2023-03-24 LAB
ALBUMIN SERPL BCP-MCNC: 3.7 G/DL (ref 3.5–5)
ALP SERPL-CCNC: 57 U/L (ref 34–104)
ALT SERPL W P-5'-P-CCNC: 24 U/L (ref 7–52)
ANION GAP SERPL CALCULATED.3IONS-SCNC: 10 MMOL/L (ref 4–13)
AST SERPL W P-5'-P-CCNC: 28 U/L (ref 13–39)
ATRIAL RATE: 77 BPM
BASE EX.OXY STD BLDV CALC-SCNC: 72.2 % (ref 60–80)
BASE EXCESS BLDV CALC-SCNC: 4.6 MMOL/L
BASOPHILS # BLD AUTO: 0.03 THOUSANDS/ÂΜL (ref 0–0.1)
BASOPHILS NFR BLD AUTO: 0 % (ref 0–1)
BILIRUB SERPL-MCNC: 0.31 MG/DL (ref 0.2–1)
BUN SERPL-MCNC: 49 MG/DL (ref 5–25)
CALCIUM SERPL-MCNC: 10 MG/DL (ref 8.4–10.2)
CHLORIDE SERPL-SCNC: 96 MMOL/L (ref 96–108)
CO2 SERPL-SCNC: 32 MMOL/L (ref 21–32)
CREAT SERPL-MCNC: 2.34 MG/DL (ref 0.6–1.3)
EOSINOPHIL # BLD AUTO: 0.29 THOUSAND/ÂΜL (ref 0–0.61)
EOSINOPHIL NFR BLD AUTO: 3 % (ref 0–6)
ERYTHROCYTE [DISTWIDTH] IN BLOOD BY AUTOMATED COUNT: 14.7 % (ref 11.6–15.1)
GFR SERPL CREATININE-BSD FRML MDRD: 20 ML/MIN/1.73SQ M
GLUCOSE SERPL-MCNC: 138 MG/DL (ref 65–140)
HCO3 BLDV-SCNC: 30.4 MMOL/L (ref 24–30)
HCT VFR BLD AUTO: 38.8 % (ref 34.8–46.1)
HGB BLD-MCNC: 12.6 G/DL (ref 11.5–15.4)
IMM GRANULOCYTES # BLD AUTO: 0.05 THOUSAND/UL (ref 0–0.2)
IMM GRANULOCYTES NFR BLD AUTO: 1 % (ref 0–2)
LYMPHOCYTES # BLD AUTO: 2.31 THOUSANDS/ÂΜL (ref 0.6–4.47)
LYMPHOCYTES NFR BLD AUTO: 23 % (ref 14–44)
MCH RBC QN AUTO: 29.6 PG (ref 26.8–34.3)
MCHC RBC AUTO-ENTMCNC: 32.5 G/DL (ref 31.4–37.4)
MCV RBC AUTO: 91 FL (ref 82–98)
MONOCYTES # BLD AUTO: 0.61 THOUSAND/ÂΜL (ref 0.17–1.22)
MONOCYTES NFR BLD AUTO: 6 % (ref 4–12)
NEUTROPHILS # BLD AUTO: 6.87 THOUSANDS/ÂΜL (ref 1.85–7.62)
NEUTS SEG NFR BLD AUTO: 67 % (ref 43–75)
NRBC BLD AUTO-RTO: 0 /100 WBCS
O2 CT BLDV-SCNC: 13.5 ML/DL
P AXIS: 53 DEGREES
PCO2 BLDV: 50.4 MM HG (ref 42–50)
PH BLDV: 7.4 [PH] (ref 7.3–7.4)
PLATELET # BLD AUTO: 252 THOUSANDS/UL (ref 149–390)
PMV BLD AUTO: 11.4 FL (ref 8.9–12.7)
PO2 BLDV: 38.6 MM HG (ref 35–45)
POTASSIUM SERPL-SCNC: 3.5 MMOL/L (ref 3.5–5.3)
PR INTERVAL: 188 MS
PROT SERPL-MCNC: 6.5 G/DL (ref 6.4–8.4)
QRS AXIS: 6 DEGREES
QRSD INTERVAL: 84 MS
QT INTERVAL: 392 MS
QTC INTERVAL: 443 MS
RBC # BLD AUTO: 4.25 MILLION/UL (ref 3.81–5.12)
SODIUM SERPL-SCNC: 138 MMOL/L (ref 135–147)
T WAVE AXIS: 26 DEGREES
VENTRICULAR RATE: 77 BPM
WBC # BLD AUTO: 10.16 THOUSAND/UL (ref 4.31–10.16)

## 2023-03-24 RX ORDER — HYDROMORPHONE HYDROCHLORIDE 2 MG/1
1 TABLET ORAL ONCE
Status: COMPLETED | OUTPATIENT
Start: 2023-03-24 | End: 2023-03-24

## 2023-03-24 RX ADMIN — HYDROMORPHONE HYDROCHLORIDE 1 MG: 2 TABLET ORAL at 23:01

## 2023-03-24 NOTE — Clinical Note
Case was discussed with Dr Isaac March and the patient's admission status was agreed to be Admission Status: inpatient status to the service of Dr Isaca March

## 2023-03-24 NOTE — TELEPHONE ENCOUNTER
Reason for Disposition  • Oxygen level (e g , pulse oximetry) 90 percent or lower    Answer Assessment - Initial Assessment Questions  1  MAIN CONCERN OR SYMPTOM: "What's your main concern?" (e g , low oxygen level, breathing difficulty) "What question do you have?"      Low Ox level- 89-90%  2  ONSET: "When did the SOB and decreased OX level start?"       Today and it is getting worse  3  OXYGEN THERAPY:     - "Do you currently use home oxygen?"     - If Yes, ask: "What is your oxygen source?" (e g , O2 tank, O2 concentrator)  - If Yes, ask: "How do you get the oxygen?" (e g , nasal prongs, face mask)  - If Yes, ask: "How much oxygen are you supposed to use?" (e g , 1-2 L NC)     In past she has used it  4   OXYGEN EQUIPMENT:  "Are you having any trouble with your oxygen equipment?"  (e g , cannula, mask, tubing, tank, concentrator)      portable machine for O2 is not working right now  5  PULSE OXIMETER:     - "Do you have a pulse oximeter (pulse ox)?"      - If Yes, ask: "Where do you place the probe?" (e g , fingertip, ear lobe)      fingertip  6  O2 MONITORING: "What is the oxygen level (pulse ox reading)?" (e g , %)       89-90 % now and she also has sleep apnea  7  VSS MONITORING: "Do you monitor/measure your oxygen level or vital signs?" (e g , yes, no, measurements are automatically sent to provider/call center)  Document CURRENT and NORMAL BASELINE values if available  -  P: "What is your pulse rate per minute?"    -  RR: "What is your respiratory rate per minute?"      unsure  8   BREATHING DIFFICULTY: "Are you having any difficulty breathing?" If Yes, ask: "How bad is it?"  (e g , none, mild, moderate, severe)     - MILD: No SOB at rest, mild SOB with walking, speaks normally in sentences, able to lie down, no retractions, pulse < 100      - MODERATE: SOB at rest, SOB with minimal exertion and prefers to sit, cannot lie down flat, speaks in phrases, mild retractions, audible wheezing, pulse 100-120      - SEVERE: Very SOB at rest, speaks in single words, struggling to breathe, sitting hunched forward, retractions, pulse > 120       SOB with movement  9  OTHER SYMPTOMS: "Do you have any other symptoms?" (e g , fever, change in sputum)      Sputum being produced -old blood- red to brown in color  No fever    10  SMOKING: "Do you smoke currently?" "Is there anyone that smokes around you?"  (Note: smoking around oxygen is dangerous!)        Denies    Protocols used: OXYGEN MONITORING AND HYPOXIA-ADULT-

## 2023-03-24 NOTE — TELEPHONE ENCOUNTER
Regarding: oxygen level low at 89 and hurts taking a deep breath  ----- Message from Hermila Jacome sent at 3/24/2023  7:36 PM EDT -----  I was hospital for an injury to my body and and face and I am  home and my oxygen 89 and even lower when I am sleeping  I want to know if I can get oxygen at home like I had at the hospital  It hurst when I take a deep breath

## 2023-03-25 ENCOUNTER — APPOINTMENT (EMERGENCY)
Dept: CT IMAGING | Facility: HOSPITAL | Age: 73
End: 2023-03-25

## 2023-03-25 PROBLEM — S02.31XD: Status: ACTIVE | Noted: 2023-03-22

## 2023-03-25 PROBLEM — J96.21 ACUTE ON CHRONIC RESPIRATORY FAILURE WITH HYPOXEMIA (HCC): Status: ACTIVE | Noted: 2023-03-25

## 2023-03-25 PROBLEM — N18.4 ACUTE RENAL FAILURE SUPERIMPOSED ON STAGE 4 CHRONIC KIDNEY DISEASE (HCC): Status: ACTIVE | Noted: 2023-03-25

## 2023-03-25 PROBLEM — N17.9 ACUTE RENAL FAILURE SUPERIMPOSED ON STAGE 4 CHRONIC KIDNEY DISEASE (HCC): Status: ACTIVE | Noted: 2023-03-25

## 2023-03-25 LAB
2HR DELTA HS TROPONIN: -1 NG/L
ANION GAP SERPL CALCULATED.3IONS-SCNC: 9 MMOL/L (ref 4–13)
APTT PPP: 24 SECONDS (ref 23–37)
BASOPHILS # BLD AUTO: 0.05 THOUSANDS/ÂΜL (ref 0–0.1)
BASOPHILS NFR BLD AUTO: 0 % (ref 0–1)
BNP SERPL-MCNC: 35 PG/ML (ref 0–100)
BUN SERPL-MCNC: 44 MG/DL (ref 5–25)
CALCIUM SERPL-MCNC: 9.5 MG/DL (ref 8.4–10.2)
CARDIAC TROPONIN I PNL SERPL HS: 5 NG/L
CARDIAC TROPONIN I PNL SERPL HS: 6 NG/L
CHLORIDE SERPL-SCNC: 99 MMOL/L (ref 96–108)
CO2 SERPL-SCNC: 31 MMOL/L (ref 21–32)
CREAT SERPL-MCNC: 2.06 MG/DL (ref 0.6–1.3)
D DIMER PPP FEU-MCNC: 0.82 UG/ML FEU
EOSINOPHIL # BLD AUTO: 0.36 THOUSAND/ÂΜL (ref 0–0.61)
EOSINOPHIL NFR BLD AUTO: 3 % (ref 0–6)
ERYTHROCYTE [DISTWIDTH] IN BLOOD BY AUTOMATED COUNT: 14.6 % (ref 11.6–15.1)
GFR SERPL CREATININE-BSD FRML MDRD: 23 ML/MIN/1.73SQ M
GLUCOSE SERPL-MCNC: 179 MG/DL (ref 65–140)
GLUCOSE SERPL-MCNC: 73 MG/DL (ref 65–140)
GLUCOSE SERPL-MCNC: 91 MG/DL (ref 65–140)
GLUCOSE SERPL-MCNC: 92 MG/DL (ref 65–140)
GLUCOSE SERPL-MCNC: 92 MG/DL (ref 65–140)
GLUCOSE SERPL-MCNC: 93 MG/DL (ref 65–140)
HCT VFR BLD AUTO: 38.7 % (ref 34.8–46.1)
HGB BLD-MCNC: 12.6 G/DL (ref 11.5–15.4)
IMM GRANULOCYTES # BLD AUTO: 0.06 THOUSAND/UL (ref 0–0.2)
IMM GRANULOCYTES NFR BLD AUTO: 1 % (ref 0–2)
INR PPP: 0.88 (ref 0.84–1.19)
LACTATE SERPL-SCNC: 1.3 MMOL/L (ref 0.5–2)
LYMPHOCYTES # BLD AUTO: 2.96 THOUSANDS/ÂΜL (ref 0.6–4.47)
LYMPHOCYTES NFR BLD AUTO: 26 % (ref 14–44)
MCH RBC QN AUTO: 30.1 PG (ref 26.8–34.3)
MCHC RBC AUTO-ENTMCNC: 32.6 G/DL (ref 31.4–37.4)
MCV RBC AUTO: 93 FL (ref 82–98)
MONOCYTES # BLD AUTO: 0.68 THOUSAND/ÂΜL (ref 0.17–1.22)
MONOCYTES NFR BLD AUTO: 6 % (ref 4–12)
NEUTROPHILS # BLD AUTO: 7.4 THOUSANDS/ÂΜL (ref 1.85–7.62)
NEUTS SEG NFR BLD AUTO: 64 % (ref 43–75)
NRBC BLD AUTO-RTO: 0 /100 WBCS
PLATELET # BLD AUTO: 264 THOUSANDS/UL (ref 149–390)
PMV BLD AUTO: 11.5 FL (ref 8.9–12.7)
POTASSIUM SERPL-SCNC: 3.8 MMOL/L (ref 3.5–5.3)
PROTHROMBIN TIME: 12.1 SECONDS (ref 11.6–14.5)
RBC # BLD AUTO: 4.18 MILLION/UL (ref 3.81–5.12)
SODIUM SERPL-SCNC: 139 MMOL/L (ref 135–147)
WBC # BLD AUTO: 11.51 THOUSAND/UL (ref 4.31–10.16)

## 2023-03-25 RX ORDER — ACETAMINOPHEN 325 MG/1
975 TABLET ORAL EVERY 8 HOURS SCHEDULED
Status: DISCONTINUED | OUTPATIENT
Start: 2023-03-25 | End: 2023-03-27 | Stop reason: HOSPADM

## 2023-03-25 RX ORDER — GABAPENTIN 300 MG/1
300 CAPSULE ORAL DAILY
Status: DISCONTINUED | OUTPATIENT
Start: 2023-03-25 | End: 2023-03-26

## 2023-03-25 RX ORDER — ENOXAPARIN SODIUM 100 MG/ML
30 INJECTION SUBCUTANEOUS
Status: DISCONTINUED | OUTPATIENT
Start: 2023-03-25 | End: 2023-03-25

## 2023-03-25 RX ORDER — PANTOPRAZOLE SODIUM 40 MG/1
40 TABLET, DELAYED RELEASE ORAL
Status: DISCONTINUED | OUTPATIENT
Start: 2023-03-25 | End: 2023-03-27 | Stop reason: HOSPADM

## 2023-03-25 RX ORDER — LOSARTAN POTASSIUM 50 MG/1
100 TABLET ORAL DAILY
Status: DISCONTINUED | OUTPATIENT
Start: 2023-03-25 | End: 2023-03-26

## 2023-03-25 RX ORDER — DULOXETIN HYDROCHLORIDE 60 MG/1
60 CAPSULE, DELAYED RELEASE ORAL DAILY
Status: DISCONTINUED | OUTPATIENT
Start: 2023-03-25 | End: 2023-03-27 | Stop reason: HOSPADM

## 2023-03-25 RX ORDER — LEVOTHYROXINE SODIUM 112 UG/1
112 TABLET ORAL
Status: DISCONTINUED | OUTPATIENT
Start: 2023-03-25 | End: 2023-03-27 | Stop reason: HOSPADM

## 2023-03-25 RX ORDER — ONDANSETRON 2 MG/ML
4 INJECTION INTRAMUSCULAR; INTRAVENOUS EVERY 6 HOURS PRN
Status: DISCONTINUED | OUTPATIENT
Start: 2023-03-25 | End: 2023-03-27 | Stop reason: HOSPADM

## 2023-03-25 RX ORDER — LIDOCAINE 50 MG/G
1 PATCH TOPICAL DAILY
Status: DISCONTINUED | OUTPATIENT
Start: 2023-03-25 | End: 2023-03-27 | Stop reason: HOSPADM

## 2023-03-25 RX ORDER — GABAPENTIN 300 MG/1
300 CAPSULE ORAL 2 TIMES DAILY
Status: DISCONTINUED | OUTPATIENT
Start: 2023-03-25 | End: 2023-03-26

## 2023-03-25 RX ORDER — HYDROMORPHONE HYDROCHLORIDE 2 MG/1
1 TABLET ORAL EVERY 4 HOURS PRN
Status: DISCONTINUED | OUTPATIENT
Start: 2023-03-25 | End: 2023-03-27 | Stop reason: HOSPADM

## 2023-03-25 RX ORDER — HYDROMORPHONE HYDROCHLORIDE 2 MG/1
2 TABLET ORAL EVERY 4 HOURS PRN
Status: DISCONTINUED | OUTPATIENT
Start: 2023-03-25 | End: 2023-03-27 | Stop reason: HOSPADM

## 2023-03-25 RX ORDER — INSULIN LISPRO 100 [IU]/ML
1-6 INJECTION, SOLUTION INTRAVENOUS; SUBCUTANEOUS
Status: DISCONTINUED | OUTPATIENT
Start: 2023-03-25 | End: 2023-03-27 | Stop reason: HOSPADM

## 2023-03-25 RX ORDER — HEPARIN SODIUM 5000 [USP'U]/ML
5000 INJECTION, SOLUTION INTRAVENOUS; SUBCUTANEOUS EVERY 8 HOURS SCHEDULED
Status: DISCONTINUED | OUTPATIENT
Start: 2023-03-25 | End: 2023-03-27 | Stop reason: HOSPADM

## 2023-03-25 RX ORDER — GUAIFENESIN 600 MG/1
600 TABLET, EXTENDED RELEASE ORAL EVERY 12 HOURS SCHEDULED
Status: DISCONTINUED | OUTPATIENT
Start: 2023-03-25 | End: 2023-03-27 | Stop reason: HOSPADM

## 2023-03-25 RX ORDER — INSULIN LISPRO 100 [IU]/ML
13 INJECTION, SOLUTION INTRAVENOUS; SUBCUTANEOUS
Status: DISCONTINUED | OUTPATIENT
Start: 2023-03-25 | End: 2023-03-27 | Stop reason: HOSPADM

## 2023-03-25 RX ORDER — SPIRONOLACTONE 25 MG/1
25 TABLET ORAL DAILY
Status: DISCONTINUED | OUTPATIENT
Start: 2023-03-25 | End: 2023-03-26

## 2023-03-25 RX ORDER — LEVALBUTEROL INHALATION SOLUTION 0.63 MG/3ML
0.63 SOLUTION RESPIRATORY (INHALATION)
Status: DISCONTINUED | OUTPATIENT
Start: 2023-03-25 | End: 2023-03-27 | Stop reason: HOSPADM

## 2023-03-25 RX ORDER — TORSEMIDE 20 MG/1
20 TABLET ORAL DAILY
Status: DISCONTINUED | OUTPATIENT
Start: 2023-03-25 | End: 2023-03-26

## 2023-03-25 RX ORDER — OXYCODONE HYDROCHLORIDE 5 MG/1
5 TABLET ORAL EVERY 4 HOURS PRN
Status: DISCONTINUED | OUTPATIENT
Start: 2023-03-25 | End: 2023-03-25

## 2023-03-25 RX ORDER — POLYETHYLENE GLYCOL 3350 17 G/17G
17 POWDER, FOR SOLUTION ORAL DAILY
Status: DISCONTINUED | OUTPATIENT
Start: 2023-03-25 | End: 2023-03-27 | Stop reason: HOSPADM

## 2023-03-25 RX ORDER — SODIUM CHLORIDE, SODIUM GLUCONATE, SODIUM ACETATE, POTASSIUM CHLORIDE, MAGNESIUM CHLORIDE, SODIUM PHOSPHATE, DIBASIC, AND POTASSIUM PHOSPHATE .53; .5; .37; .037; .03; .012; .00082 G/100ML; G/100ML; G/100ML; G/100ML; G/100ML; G/100ML; G/100ML
75 INJECTION, SOLUTION INTRAVENOUS CONTINUOUS
Status: DISCONTINUED | OUTPATIENT
Start: 2023-03-25 | End: 2023-03-27

## 2023-03-25 RX ORDER — ALBUTEROL SULFATE 90 UG/1
2 AEROSOL, METERED RESPIRATORY (INHALATION) EVERY 6 HOURS PRN
Status: DISCONTINUED | OUTPATIENT
Start: 2023-03-25 | End: 2023-03-27 | Stop reason: HOSPADM

## 2023-03-25 RX ORDER — INSULIN LISPRO 100 [IU]/ML
25 INJECTION, SOLUTION INTRAVENOUS; SUBCUTANEOUS
Status: DISCONTINUED | OUTPATIENT
Start: 2023-03-25 | End: 2023-03-27 | Stop reason: HOSPADM

## 2023-03-25 RX ORDER — AMLODIPINE BESYLATE 5 MG/1
5 TABLET ORAL DAILY
Status: DISCONTINUED | OUTPATIENT
Start: 2023-03-25 | End: 2023-03-27 | Stop reason: HOSPADM

## 2023-03-25 RX ORDER — AMOXICILLIN 250 MG
2 CAPSULE ORAL 2 TIMES DAILY
Status: DISCONTINUED | OUTPATIENT
Start: 2023-03-25 | End: 2023-03-27 | Stop reason: HOSPADM

## 2023-03-25 RX ORDER — METOPROLOL TARTRATE 50 MG/1
50 TABLET, FILM COATED ORAL EVERY 12 HOURS SCHEDULED
Status: DISCONTINUED | OUTPATIENT
Start: 2023-03-25 | End: 2023-03-27 | Stop reason: HOSPADM

## 2023-03-25 RX ORDER — PRAMIPEXOLE DIHYDROCHLORIDE 0.25 MG/1
0.25 TABLET ORAL
Status: DISCONTINUED | OUTPATIENT
Start: 2023-03-25 | End: 2023-03-27 | Stop reason: HOSPADM

## 2023-03-25 RX ADMIN — LOSARTAN POTASSIUM 100 MG: 50 TABLET, FILM COATED ORAL at 08:34

## 2023-03-25 RX ADMIN — GUAIFENESIN 600 MG: 600 TABLET ORAL at 21:24

## 2023-03-25 RX ADMIN — IOHEXOL 52 ML: 350 INJECTION, SOLUTION INTRAVENOUS at 02:12

## 2023-03-25 RX ADMIN — LEVALBUTEROL HYDROCHLORIDE 0.63 MG: 0.63 SOLUTION RESPIRATORY (INHALATION) at 19:24

## 2023-03-25 RX ADMIN — INSULIN LISPRO 9 UNITS: 100 INJECTION, SOLUTION INTRAVENOUS; SUBCUTANEOUS at 08:38

## 2023-03-25 RX ADMIN — AMLODIPINE BESYLATE 5 MG: 5 TABLET ORAL at 08:34

## 2023-03-25 RX ADMIN — LEVOTHYROXINE SODIUM 112 MCG: 112 TABLET ORAL at 05:25

## 2023-03-25 RX ADMIN — SODIUM CHLORIDE, SODIUM GLUCONATE, SODIUM ACETATE, POTASSIUM CHLORIDE, MAGNESIUM CHLORIDE, SODIUM PHOSPHATE, DIBASIC, AND POTASSIUM PHOSPHATE 75 ML/HR: .53; .5; .37; .037; .03; .012; .00082 INJECTION, SOLUTION INTRAVENOUS at 18:33

## 2023-03-25 RX ADMIN — GABAPENTIN 300 MG: 300 CAPSULE ORAL at 08:34

## 2023-03-25 RX ADMIN — IPRATROPIUM BROMIDE 0.5 MG: 0.5 SOLUTION RESPIRATORY (INHALATION) at 13:08

## 2023-03-25 RX ADMIN — SPIRONOLACTONE 25 MG: 25 TABLET ORAL at 08:34

## 2023-03-25 RX ADMIN — METOPROLOL TARTRATE 50 MG: 50 TABLET, FILM COATED ORAL at 21:24

## 2023-03-25 RX ADMIN — PANTOPRAZOLE SODIUM 40 MG: 40 TABLET, DELAYED RELEASE ORAL at 05:25

## 2023-03-25 RX ADMIN — TORSEMIDE 20 MG: 20 TABLET ORAL at 08:34

## 2023-03-25 RX ADMIN — DULOXETINE HYDROCHLORIDE 60 MG: 60 CAPSULE, DELAYED RELEASE ORAL at 08:34

## 2023-03-25 RX ADMIN — PANTOPRAZOLE SODIUM 40 MG: 40 TABLET, DELAYED RELEASE ORAL at 18:20

## 2023-03-25 RX ADMIN — LEVALBUTEROL HYDROCHLORIDE 0.63 MG: 0.63 SOLUTION RESPIRATORY (INHALATION) at 13:08

## 2023-03-25 RX ADMIN — SODIUM CHLORIDE, SODIUM GLUCONATE, SODIUM ACETATE, POTASSIUM CHLORIDE, MAGNESIUM CHLORIDE, SODIUM PHOSPHATE, DIBASIC, AND POTASSIUM PHOSPHATE 75 ML/HR: .53; .5; .37; .037; .03; .012; .00082 INJECTION, SOLUTION INTRAVENOUS at 03:59

## 2023-03-25 RX ADMIN — INSULIN DETEMIR 50 UNITS: 100 INJECTION, SOLUTION SUBCUTANEOUS at 21:24

## 2023-03-25 RX ADMIN — HYDROMORPHONE HYDROCHLORIDE 1 MG: 2 TABLET ORAL at 09:17

## 2023-03-25 RX ADMIN — IPRATROPIUM BROMIDE 0.5 MG: 0.5 SOLUTION RESPIRATORY (INHALATION) at 07:45

## 2023-03-25 RX ADMIN — HEPARIN SODIUM 5000 UNITS: 5000 INJECTION INTRAVENOUS; SUBCUTANEOUS at 05:35

## 2023-03-25 RX ADMIN — ACETAMINOPHEN 975 MG: 325 TABLET ORAL at 05:25

## 2023-03-25 RX ADMIN — HEPARIN SODIUM 5000 UNITS: 5000 INJECTION INTRAVENOUS; SUBCUTANEOUS at 14:14

## 2023-03-25 RX ADMIN — GABAPENTIN 300 MG: 300 CAPSULE ORAL at 18:20

## 2023-03-25 RX ADMIN — HYDROMORPHONE HYDROCHLORIDE 2 MG: 2 TABLET ORAL at 18:27

## 2023-03-25 RX ADMIN — HYDROMORPHONE HYDROCHLORIDE 1 MG: 2 TABLET ORAL at 05:24

## 2023-03-25 RX ADMIN — SODIUM CHLORIDE 500 ML: 0.9 INJECTION, SOLUTION INTRAVENOUS at 01:28

## 2023-03-25 RX ADMIN — PRAMIPEXOLE DIHYDROCHLORIDE 0.25 MG: 0.25 TABLET ORAL at 21:24

## 2023-03-25 RX ADMIN — LEVALBUTEROL HYDROCHLORIDE 0.63 MG: 0.63 SOLUTION RESPIRATORY (INHALATION) at 07:45

## 2023-03-25 RX ADMIN — LIDOCAINE 5% 1 PATCH: 700 PATCH TOPICAL at 05:25

## 2023-03-25 RX ADMIN — POLYETHYLENE GLYCOL 3350 17 G: 17 POWDER, FOR SOLUTION ORAL at 08:35

## 2023-03-25 RX ADMIN — GABAPENTIN 300 MG: 300 CAPSULE ORAL at 18:21

## 2023-03-25 RX ADMIN — IPRATROPIUM BROMIDE 0.5 MG: 0.5 SOLUTION RESPIRATORY (INHALATION) at 19:24

## 2023-03-25 RX ADMIN — METOPROLOL TARTRATE 50 MG: 50 TABLET, FILM COATED ORAL at 08:34

## 2023-03-25 RX ADMIN — ACETAMINOPHEN 975 MG: 325 TABLET ORAL at 21:24

## 2023-03-25 RX ADMIN — GUAIFENESIN 600 MG: 600 TABLET ORAL at 08:34

## 2023-03-25 RX ADMIN — INSULIN LISPRO 13 UNITS: 100 INJECTION, SOLUTION INTRAVENOUS; SUBCUTANEOUS at 12:52

## 2023-03-25 RX ADMIN — SENNOSIDES AND DOCUSATE SODIUM 2 TABLET: 8.6; 5 TABLET ORAL at 18:20

## 2023-03-25 RX ADMIN — HEPARIN SODIUM 5000 UNITS: 5000 INJECTION INTRAVENOUS; SUBCUTANEOUS at 21:24

## 2023-03-25 RX ADMIN — SENNOSIDES AND DOCUSATE SODIUM 2 TABLET: 8.6; 5 TABLET ORAL at 08:34

## 2023-03-25 NOTE — ASSESSMENT & PLAN NOTE
- Continue home albuterol  - Respiratory protocol and Airway clearance ordered  - Aggressive pulmonary hygiene

## 2023-03-25 NOTE — ASSESSMENT & PLAN NOTE
- Known facial fractures from mechanical fall  - OMFS follow up scheduled for Monday 3/27 outpatient  - Ophthalmology evaluated the patient previous admission  - No new pain or trauma  - Sinus precautions  - Was given 1 dose of ancef previous admission, not started on antibiotics

## 2023-03-25 NOTE — H&P
[facility]  H&P  Name: Reola Kocher  MRN: [id number]  Unit/Bed#: S -01 I Date of Admission: 3/24/2023   Date of Service: 3/25/2023 I Hospital Day: 0      Assessment/Plan   * Acute on chronic respiratory failure with hypoxemia [facility])  Assessment & Plan  - Acute hypoxemia on room air, requiring on 2L O2 NC in the setting of known right anterior 6th and 7th buckle rib fractures and chronic COPD  - Rib fracture protocol  - Aggressive pulmonary hygiene  - Likely due to atelectasis  - Acute PE ruled out with CTA chest  - Continue supplemental O2 as needed to maintain O2 > 88% due to history of COPD  - Continuous pulse oximetry    Acute renal failure superimposed on stage 4 chronic kidney disease [facility])  Assessment & Plan  Lab Results   Component Value Date    EGFR 20 03/24/2023    EGFR 25 03/22/2023    EGFR 28 02/23/2023    CREATININE 2 34 (H) 03/24/2023    CREATININE 1 93 (H) 03/22/2023    CREATININE 1 73 (H) 02/23/2023     - Baseline Cr 1 4-1 7  - Cr 2 34 on admission  - Continue home medications including diuretics   - Continue gentle IV fluids  - Monitor BMP closely  - SQH for DVT ppx    Multiple closed fractures of ribs of right side  Assessment & Plan  - Known right anterior 7th and 8th rib buckle fractures from mechanical fall 3/22/23  - Continue rib fracture protocol  - Continue to encourage incentive spirometer use and adequate pulmonary hygiene  Currently pulling 1250 mL on I S   - Continue multimodal analgesic regimen  - Appreciate APS evaluation and recommendations from prior admission: Dilaudid 1 mg/2 mg PO tablets for mod-severe pain because patient experiences nausea/ vomiting with oxycodone  - Supplemental oxygen via nasal cannula as needed to maintain saturations greater than or equal to 94%    - PT and OT evaluation and treatment as indicated          Fracture of orbital floor, blow-out, right, closed, with routine healing, subsequent encounter  Assessment & Plan  - Known facial fractures from mechanical fall  - OMFS follow up scheduled for Monday 3/27 outpatient  - Ophthalmology evaluated the patient previous admission  - No new pain or trauma  - Sinus precautions  - Was given 1 dose of ancef previous admission, not started on antibiotics    COPD (chronic obstructive pulmonary disease) ([facility] [address] )  Assessment & Plan  - Continue home albuterol  - Respiratory protocol and Airway clearance ordered  - Aggressive pulmonary hygiene    Type 2 diabetes mellitus with diabetic nephropathy, with long-term current use of insulin [facility])  Assessment & Plan  Lab Results   Component Value Date    HGBA1C 6 8 (H) 03/07/2023       Recent Labs     03/22/23  2113 03/23/23  0726 03/23/23  1114   POCGLU 251* 234* 222*       Blood Sugar Average: Last 72 hrs:    - Continue home insulin regimen:   - 13 units of mealtime insulin for breakfast and lunch, 25 units for dinner   - 50 units of Lantus QHS  - Sliding scale insulin added             Trauma Alert: Evaluation; trauma team notified at 0330 via phone   Model of Arrival: Ambulance    Trauma Team: Attending Les Yoon and NABILA [address] [place] [address]  Consultants:     None     History of Present Illness     Chief Complaint: Shortness of breath  Mechanism:Other: no new trauma     HPI:    Michelle Gooden is a 67 y o  female who presents with shortness of breath  Patient reports she was in the hospital from 3/22-3/23 after a fall, was diagnosed with right sided rib fractures and facial fractures and she was feeling well, so she was discharged  She denies any new trauma, but reports that over the past 24 hours, she has had worsening right sided chest wall pain, difficulty taking deep breaths, and severe pain with coughing  She reports that she also feels more wheezy than normal  She reports that her facial pain is unchanged  Review of Systems   Constitutional: Positive for activity change and appetite change  HENT: Positive for congestion and facial swelling      Eyes: Negative "for photophobia and visual disturbance  Respiratory: Positive for shortness of breath and wheezing  Cardiovascular: Positive for chest pain  Gastrointestinal: Negative for abdominal pain, nausea and vomiting  Musculoskeletal: Negative for back pain and neck pain  Skin: Negative for wound  Neurological: Negative for dizziness, syncope, weakness, light-headedness, numbness and headaches  Psychiatric/Behavioral: Negative for confusion  All other systems reviewed and are negative  12-point, complete review of systems was reviewed and negative except as stated above  Historical Information     Past Medical History:   Diagnosis Date   • Allergic    • Allergic rhinitis    • Anesthesia     pt reports \"had to use double lumen endo tube for hiatal hernia repair/so surgeon could get to where he needed to work\"   • Arthritis    • Aspiration into airway    • Basal cell carcinoma 2007    left cheek    • BCC (basal cell carcinoma) 05/27/2021    Left Nasal tip   • Cancer (HCC)     squamous cell cancer on forhead   • Cancer ([facility] [address] )     basal cell on nose    • Chronic kidney disease 2000, 2018    Stones, kidney disease stage 4   • Chronic pain disorder     bilat feet and joint pain on occas   • Colon polyp    • Constipation    • COPD (chronic obstructive pulmonary disease) ([facility] [address] )    • COVID-19 08/2021    recovered at home/did receive monoclonal infusion   • Dental bridge present    • Depression    • Diabetes mellitus ([address] )     Type 2   • Diabetic neuropathy ([facility] [address] )    • Disease of thyroid gland    • Family history of thyroid problem    • Fatty liver    • GERD (gastroesophageal reflux disease)    • Heart burn    • Hiatal hernia    • History of pneumonia    • Hyperlipidemia    • Hyperplasia, parathyroid ([facility] [address] )    • Hypertension    • Kidney problem    • Kidney stone    • Memory loss Julu2 2020   • Motion sickness    • Motion sickness    • Neck pain    • Obesity 1978   • Obesity (BMI 30 0-34  9)    • Pollen allergies  " • RLS (restless legs syndrome)    • SCC (squamous cell carcinoma) 05/04/2021    left mid forehead   • Seasonal allergies    • Sleep apnea     has inspire   • Squamous cell skin cancer 2007    left cheek    • Swollen ankles    • 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, left     great toe fusion   • Urinary incontinence    • Urinary tract infection 03/28/2022   • Wears glasses      Past Surgical History:   Procedure Laterality Date   • ARTHROSCOPY KNEE     • BREAST BIOPSY      stereotactic-benign   • BREAST BIOPSY      stereo-benign   • BREAST EXCISIONAL BIOPSY      unknown date-benign   • BREAST EXCISIONAL BIOPSY      unknown date-benign   • BREAST EXCISIONAL BIOPSY      unknown date-benign   • BREAST EXCISIONAL BIOPSY      unknown age-benign   • CARDIAC CATHETERIZATION     • CHOLECYSTECTOMY     • COLONOSCOPY     • EXAMINATION UNDER ANESTHESIA N/A 06/24/2021    Procedure: EXAM UNDER ANESTHESIA (EUA), DISE;  Surgeon: Rodriguez Glass MD;  [facility];  Service: ENT   • HERNIA REPAIR     • HIATAL HERNIA REPAIR     • KNEE SURGERY      Torn maniscus lap surg   • LIPOSUCTION     • LYMPH NODE BIOPSY     • MOHS SURGERY  05/20/2021    left mid forehead-Gautam   • MOHS SURGERY Left 05/27/2021    Left nasal tip- gautam    • WY LIGATION/BIOPSY TEMPORAL ARTERY Left 1/5/2023    Procedure: BIOPSY ARTERY TEMPORAL;  Surgeon: Chante Epstein DO;  Location[facility];  Service: Vascular   • W[place] OPEN IMPLANTATION CRANIAL NERVE VASQUEZ & PULSE GEN N/A 11/10/2021    Procedure: INSERTION UPPER AIRWAY STIMULATOR, INSPIRE IMPLANT;  Surgeon: Rodriguez Glass MD;  Location: [facility];  Service: ENT   • W[place] UNLISTED PROCEDURE FOOT/TOES Right 07/19/2022    Procedure: 1st metatarsal phalangeal joint fusion;  Surgeon: Dipika Horton DPM;  Location: [facility];  Service: Podiatry   • REDUCTION MAMMAPLASTY Bilateral 2000   • REDUCTION MAMMAPLASTY     • SKIN BIOPSY     • SKIN CANCER EXCISION  2007    squamous cell carcinoma    • SKIN CANCER EXCISION  2007    basal cell carcinoma   • SQUAMOUS CELL CARCINOMA EXCISION     • TOE SURGERY Right 2022    Right Great Toe Fusion   • TONSILLECTOMY     • TUBAL LIGATION     • UPPER GASTROINTESTINAL ENDOSCOPY     • US GUIDED BREAST BIOPSY RIGHT COMPLETE Right 2022        Social History     Tobacco Use   • Smoking status: Former     Packs/day: 2 00     Years: 10 00     Pack years: 20 00     Types: Cigarettes     Start date: 1968     Quit date: 1976     Years since quittin 2   • Smokeless tobacco: Never   • Tobacco comments:     Smoked 2 pack a day   Vaping Use   • Vaping Use: Never used   Substance Use Topics   • Alcohol use: Yes     Comment: 1 or 2 a year   • Drug use: No     Immunization History   Administered Date(s) Administered   • COVID-19 MODERNA VACC 0 5 ML IM 2021, 2021, 2022   • INFLUENZA 10/17/2018, 10/01/2019   • Influenza Split High Dose Preservative Free IM 10/17/2018, 10/01/2019   • Influenza, high dose seasonal 0 7 mL 2020, 10/12/2021   • Pneumococcal Conjugate Vaccine 20-valent (Pcv20), Polysace 2022, 2022   • Pneumococcal Polysaccharide PPV23 2021   • Tdap 2018   • influenza, trivalent, adjuvanted 2020, 10/12/2021     Last Tetanus: 2018  Family History: Non-contributory    1  Before the illness or injury that brought you to the Emergency, did you need someone to help you on a regular basis? 0=No   2  Since the illness or injury that brought you to the Emergency, have you needed more help than usual to take care of yourself? 1=Yes   3  Have you been hospitalized for one or more nights during the past 6 months (excluding a stay in the Emergency Department)? 1=Yes   4  In general, do you see well? 1=No   5  In general, do you have serious problems with your memory? 0=No   6  Do you take more than three different medications everyday?  1=Yes   TOTAL   4     Did you order a geriatric consult if the score was 2 or greater?: yes     Meds/Allergies   all current active meds have been reviewed     Allergies   Allergen Reactions   • Sulfamethoxazole-Trimethoprim Other (See Comments)     Tongue swelling   • Ciprofloxacin Hives and Itching       Objective   Initial Vitals:   Temperature: 98 5 °F (36 9 °C) (03/24/23 2018)  Pulse: 76 (03/24/23 2016)  Respirations: 18 (03/24/23 2016)  Blood Pressure: 147/74 (03/24/23 2016)    Primary Survey:   Airway:        Status: patent;        Pre-hospital Interventions: none        Hospital Interventions: none  Breathing:        Pre-hospital Interventions: oxygen       Effort: normal       Right breath sounds: normal       Left breath sounds: normal  Circulation:        Rhythm: regular       Rate: regular   Right Pulses Left Pulses    R radial: 2+    R pedal: 2+     L radial: 2+    L pedal: 2+       Disability:        GCS: Eye: 4; Verbal: 5 Motor: 6 Total: 15       Right Pupil: round;  reactive         Left Pupil:  round;  reactive      R Motor Strength L Motor Strength    R : 5/5  R dorsiflex: 5/5  R plantarflex: 5/5 L : 5/5  L dorsiflex: 5/5  L plantarflex: 5/5        Sensory:  No sensory deficit  Exposure:       Completed: Yes      Secondary Survey:  Physical Exam  Vitals reviewed  Constitutional:       General: She is not in acute distress  Appearance: Normal appearance  HENT:      Head: Normocephalic  Comments: Facial ecchymosis, right side worse than left     Right Ear: External ear normal       Left Ear: External ear normal       Nose: Nose normal       Mouth/Throat:      Mouth: Mucous membranes are moist       Pharynx: Oropharynx is clear  Eyes:      Extraocular Movements: Extraocular movements intact  Conjunctiva/sclera: Conjunctivae normal       Pupils: Pupils are equal, round, and reactive to light  Cardiovascular:      Rate and Rhythm: Normal rate and regular rhythm  Pulses: Normal pulses  Heart sounds: Normal heart sounds     Pulmonary:      Effort: Pulmonary effort is normal  No respiratory distress  Breath sounds: Normal breath sounds  Comments: Right anterior chest wall tenderness  Chest:      Chest wall: Tenderness present  Abdominal:      General: Abdomen is flat  Bowel sounds are normal  There is no distension  Palpations: Abdomen is soft  There is no mass  Tenderness: There is no abdominal tenderness  There is no guarding or rebound  Hernia: No hernia is present  Musculoskeletal:         General: No swelling, tenderness, deformity or signs of injury  Normal range of motion  Cervical back: Normal range of motion  Rigidity present  No tenderness  Skin:     General: Skin is warm and dry  Capillary Refill: Capillary refill takes less than 2 seconds  Findings: Bruising present  No lesion  Neurological:      General: No focal deficit present  Mental Status: She is alert and oriented to person, place, and time  Mental status is at baseline  Sensory: No sensory deficit  Motor: No weakness  Psychiatric:         Mood and Affect: Mood normal          Behavior: Behavior normal          Invasive Devices     Peripheral Intravenous Line  Duration           Peripheral IV 03/24/23 Dorsal (posterior); Right Forearm <1 day    Peripheral IV 03/24/23 Left Antecubital <1 day              Lab Results:   Results: I have personally reviewed all pertinent laboratory/tests results, BMP/CMP:   Lab Results   Component Value Date    SODIUM 138 03/24/2023    K 3 5 03/24/2023    CL 96 03/24/2023    CO2 32 03/24/2023    BUN 49 (H) 03/24/2023    CREATININE 2 34 (H) 03/24/2023    CALCIUM 10 0 03/24/2023    AST 28 03/24/2023    ALT 24 03/24/2023    ALKPHOS 57 03/24/2023    EGFR 20 03/24/2023    and CBC:   Lab Results   Component Value Date    WBC 10 16 03/24/2023    HGB 12 6 03/24/2023    HCT 38 8 03/24/2023    MCV 91 03/24/2023     03/24/2023    MCH 29 6 03/24/2023    MCHC 32 5 03/24/2023    RDW 14 7 03/24/2023    MPV 11 4 03/24/2023    NRBC 0 03/24/2023       Imaging Results: I have personally reviewed pertinent reports  Chest Xray(s): pending   FAST exam(s): N/A   CT Scan(s): negative for acute findings   Additional Xray(s): N/A     Other Studies:   CTA ED chest PE Study   Final Result by Aleida Russo DO (03/25 1656)      No evidence of pulmonary artery embolus      Unchanged right anterior 7th and ribs buckle fractures            Workstation performed: [id number]         XR chest 2 views   ED Interpretation by Maegan Rodriguez DO (03/24 9193)   No pneumothorax, pleural effusion, or pulmonary edema  RML with subtle opacification of entire lobe  Code Status: Prior  Advance Directive and Living Will: Yes    Power of :    POLST:    I have spent 30 minutes with Patient  today in which greater than 50% of this time was spent in counseling/coordination of care regarding Diagnostic results, Prognosis, Risks and benefits of tx options, Instructions for management, Patient and family education, Importance of tx compliance, Risk factor reductions, Impressions, Counseling / Coordination of care, Documenting in the medical record, Reviewing / ordering tests, medicine, procedures  , Obtaining or reviewing history   and Communicating with other healthcare professionals

## 2023-03-25 NOTE — ED PROVIDER NOTES
History  Chief Complaint   Patient presents with   • Decreased Oxygen Level     Pt complains of a low oxygen reading at home of 88-90%  Pt was here 2 days ago for a fall and was discharged Thursday  Mrs Fuentes is a 67 yof recently discharged after short hospitalization for right orbital fracture, nasal bone fracture, and right sided rib 7 and 8 buckle fractures, who presents with mild shortness of breath and hypoxia on room air  States that she was discharged 3/23/2023, at which time she had minimal right-sided rib pain  Since discharge, states pain on the right ribs has become severe, is worse with deep inspiration and movement, however is not fully reproducible by these maneuvers  Is also endorsing mild exertional dyspnea, noted that her pulse ox was between 88 and 90% on room air at home  Has a mild dry cough  Is using her incentive spirometer multiple times a day as directed, is also taking pain medications as prescribed  On the way to the hospital this evening, developed left-sided shoulder pressure with radiation into the left arm, lasting approximately 10 minutes, without accompanying symptoms, resolve spontaneously  Denies fever, chills, weakness, lightheadedness, vision changes, congestion, sore throat, left-sided chest pain, abdominal pain, nausea, vomiting, diarrhea, constipation, dysuria, hematuria, peripheral edema, unilateral calf pain or swelling  No history of PE or DVT  Is not on blood thinners  Does have nasal bone fracture as result of the fall the other day, however states nares are open and she is able to breathe freely through them  She does not feel this is contributing to her shortness of breath  Denies epistaxis  Feels that her facial swelling and pain is improving          Prior to Admission Medications   Prescriptions Last Dose Informant Patient Reported? Taking?    B-D ULTRAFINE III SHORT PEN 31G X 8 MM MISC 3/24/2023  Yes Yes   Sig: USE AS DIRECTED 4 TIMES PER DAY   Calcium Citrate 1040 MG TABS 3/24/2023  Yes Yes   Sig: Take 500 mg by mouth Three times a day   DULoxetine (CYMBALTA) 30 mg delayed release capsule 3/24/2023  Yes Yes   Sig: Take 60 mg by mouth daily   HYDROmorphone (DILAUDID) 2 mg tablet 3/24/2023  No Yes   Sig: Take 1 mg to 2 mg PO every 4 hours as needed for moderate to severe pain  Ongoing therapy  Probiotic Product (PROBIOTIC PO) 3/24/2023  Yes Yes   Sig: Take 1 capsule by mouth 2 (two) times a day   Vitamin D, Ergocalciferol, 2000 units CAPS 3/24/2023  Yes Yes   Sig: Take 2,000 Units by mouth daily    acetaminophen (TYLENOL) 325 mg tablet 3/24/2023  No Yes   Sig: Take 3 tablets (975 mg total) by mouth every 8 (eight) hours   albuterol (Ventolin HFA) 90 mcg/act inhaler 3/24/2023  No Yes   Sig: Inhale 2 puffs every 6 (six) hours as needed for wheezing   amLODIPine (NORVASC) 5 mg tablet 3/24/2023  No Yes   Sig: Take 1 tablet (5 mg total) by mouth daily   b complex vitamins capsule Past Week  Yes Yes   Sig: Take 1 capsule by mouth daily   gabapentin (Neurontin) 300 mg capsule 3/24/2023  No Yes   Sig: Take one cap PO in the morning and afternoon and two caps PO in the evening   insulin aspart (NovoLOG) 100 units/mL injection 3/24/2023  Yes Yes   Sig: Inject under the skin 3 (three) times a day before meals 16 units, 16 units, 25 units     insulin detemir (Levemir FlexTouch) 100 Units/mL injection pen 3/24/2023  Yes Yes   Sig: Inject 50 Units under the skin every evening    levothyroxine 112 mcg tablet 3/24/2023  Yes Yes   Sig: Take 112 mcg by mouth every morning   metoprolol tartrate (LOPRESSOR) 50 mg tablet 3/24/2023  No Yes   Sig: TAKE 1 TABLET (50 MG TOTAL) BY MOUTH EVERY 12 (TWELVE) HOURS   olmesartan (BENICAR) 40 mg tablet 3/24/2023  No Yes   Sig: Take 1 tablet (40 mg total) by mouth daily at bedtime   pantoprazole (PROTONIX) 40 mg tablet 3/24/2023  No Yes   Sig: TAKE 1 TABLET BY MOUTH TWICE A DAY   pramipexole (MIRAPEX) 0 25 mg tablet 3/24/2023  No Yes   Sig: Take 1 "tablet (0 25 mg total) by mouth daily at bedtime   sodium chloride (OCEAN) 0 65 % nasal spray 3/24/2023  No Yes   Si spray into each nostril every hour as needed for congestion   spironolactone (ALDACTONE) 25 mg tablet 3/24/2023  No Yes   Sig: Take 1 tablet (25 mg total) by mouth daily   torsemide (DEMADEX) 20 mg tablet 3/24/2023  No Yes   Sig: TAKE 1 TABLET BY MOUTH EVERY DAY      Facility-Administered Medications: None       Past Medical History:   Diagnosis Date   • Allergic    • Allergic rhinitis    • Anesthesia     pt reports \"had to use double lumen endo tube for hiatal hernia repair/so surgeon could get to where he needed to work\"   • Arthritis    • Aspiration into airway    • Basal cell carcinoma     left cheek    • BCC (basal cell carcinoma) 2021    Left Nasal tip   • Cancer ([facility] [address] )     squamous cell cancer on forhead   • Cancer ([facility] [address] )     basal cell on nose    • Chronic kidney disease ,     Stones, kidney disease stage 4   • Chronic pain disorder     bilat feet and joint pain on occas   • Colon polyp    • Constipation    • COPD (chronic obstructive pulmonary disease) ([address] )    • COVID-19 2021    recovered at home/did receive monoclonal infusion   • Dental bridge present    • Depression    • Diabetes mellitus ([facility] [address] )     Type 2   • Diabetic neuropathy ([facility] [address] )    • Disease of thyroid gland    • Family history of thyroid problem    • Fatty liver    • GERD (gastroesophageal reflux disease)    • Heart burn    • Hiatal hernia    • History of pneumonia    • Hyperlipidemia    • Hyperplasia, parathyroid ([facility] [address] )    • Hypertension    • Kidney problem    • Kidney stone    • Memory loss 2020   • Motion sickness    • Motion sickness    • Neck pain    • Obesity    • Obesity (BMI 30 0-34  9)    • Pollen allergies    • RLS (restless legs syndrome)    • SCC (squamous cell carcinoma) 2021    left mid forehead   • Seasonal allergies    • Sleep apnea     has inspire   • Squamous cell skin cancer " 2007    left cheek    • Swollen ankles    • 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, left     great toe fusion   • Urinary incontinence    • Urinary tract infection 03/28/2022   • Wears glasses        Past Surgical History:   Procedure Laterality Date   • ARTHROSCOPY KNEE     • BREAST BIOPSY      stereotactic-benign   • BREAST BIOPSY      stereo-benign   • BREAST EXCISIONAL BIOPSY      unknown date-benign   • BREAST EXCISIONAL BIOPSY      unknown date-benign   • BREAST EXCISIONAL BIOPSY      unknown date-benign   • BREAST EXCISIONAL BIOPSY      unknown age-benign   • CARDIAC CATHETERIZATION     • CHOLECYSTECTOMY     • COLONOSCOPY     • EXAMINATION UNDER ANESTHESIA N/A 06/24/2021    Procedure: EXAM UNDER ANESTHESIA (EUA), DISE;  Surgeon: Sammy Guevara MD;  Location: [facility];  Service: ENT   • HERNIA REPAIR     • HIATAL HERNIA REPAIR     • KNEE SURGERY      Torn maniscus lap surg   • LIPOSUCTION     • LYMPH NODE BIOPSY     • MOHS SURGERY  05/20/2021    left mid forehead-Gautam   • MOHS SURGERY Left 05/27/2021    Left nasal tip- gautam    • MN LIGATION/BIOPSY TEMPORAL ARTERY Left 1/5/2023    Procedure: BIOPSY ARTERY TEMPORAL;  Surgeon: Katrina Chester DO;  [facility];  Service: Vascular   • MN OPEN IMPLANTATION CRANIAL NERVE VASQUEZ & PULSE GEN N/A 11/10/2021    Procedure: INSERTION UPPER AIRWAY STIMULATOR, INSPIRE IMPLANT;  Surgeon: Sammy Guevara MD;  [facility];  Service: ENT   • MN UNLISTED PROCEDURE FOOT/TOES Right 07/19/2022    Procedure: 1st metatarsal phalangeal joint fusion;  Surgeon: Alisa Carrillo DPM;  Location[facility];  Service: Podiatry   • REDUCTION MAMMAPLASTY Bilateral 2000   • REDUCTION MAMMAPLASTY     • SKIN BIOPSY     • SKIN CANCER EXCISION  2007    squamous cell carcinoma    • SKIN CANCER EXCISION  2007    basal cell carcinoma   • SQUAMOUS CELL CARCINOMA EXCISION     • TOE SURGERY Right 08/04/2022    Right Great Toe Fusion   • TONSILLECTOMY     • TUBAL LIGATION     • UPPER GASTROINTESTINAL ENDOSCOPY     • US GUIDED BREAST BIOPSY RIGHT COMPLETE Right 2022       Family History   Problem Relation Age of Onset   • Heart disease Mother    • Depression Mother    • Hypertension Mother    • COPD Mother    • Hearing loss Mother    • Anxiety disorder Mother    • Heart disease Father    • Lung cancer Father 79        Smoker    • Cancer Father         brain   • Alcohol abuse Father    • Dementia Father    • Hypertension Father    • Thyroid disease Father    • COPD Father    • Arthritis Father    • Brain cancer Father 76   • Hypertension Sister    • Diabetes Sister    • Heart disease Sister    • Thyroid disease Sister    • Cancer Sister         Lympoma, lung   • Lung cancer Sister 78   • Anxiety disorder Sister    • Hypertension Brother    • Diabetes Brother    • Cancer Brother         Throat   • Dementia Brother    • Stroke Brother    • Hypertension Brother    • Heart disease Brother    • Diabetes Brother    • No Known Problems Son    • No Known Problems Son    • Brain cancer Paternal Aunt         unknown age   • Breast cancer Neg Hx      I have reviewed and agree with the history as documented  E-Cigarette/Vaping   • E-Cigarette Use Never User      E-Cigarette/Vaping Substances   • Nicotine No    • THC No    • CBD No    • Flavoring No    • Other No    • Unknown No      Social History     Tobacco Use   • Smoking status: Former     Packs/day: 2 00     Years: 10 00     Pack years: 20 00     Types: Cigarettes     Start date: 1968     Quit date: 1976     Years since quittin 2   • Smokeless tobacco: Never   • Tobacco comments:     Smoked 2 pack a day   Vaping Use   • Vaping Use: Never used   Substance Use Topics   • Alcohol use: Yes     Comment: 1 or 2 a year   • Drug use: No        Review of Systems   Constitutional: Negative  Negative for chills, diaphoresis, fatigue and fever  HENT: Positive for facial swelling  Negative for nosebleeds           Has continued facial bruising, swelling, and pain from fall earlier this week, however this is much improved  Eyes: Negative  Negative for visual disturbance  Respiratory: Positive for cough and shortness of breath  Negative for chest tightness and wheezing  Cardiovascular: Positive for chest pain  Negative for palpitations and leg swelling  Left shoulder pressure with radiation into the left arm lasting 10 minutes, spontaneously resolved prior to arrival    Right lower rib and chest pain, non-radiating, worse with movement and deep inspiration  Gastrointestinal: Negative  Negative for abdominal distention, abdominal pain, constipation, diarrhea, nausea and vomiting  Endocrine: Negative  Genitourinary: Negative  Musculoskeletal: Negative for back pain and neck pain  Neurological: Negative for dizziness, syncope, facial asymmetry, light-headedness and headaches  Hematological: Negative  Does not bruise/bleed easily  Psychiatric/Behavioral: Negative  All other systems reviewed and are negative  Physical Exam  ED Triage Vitals   Temperature Pulse Respirations Blood Pressure SpO2   03/24/23 2018 03/24/23 2016 03/24/23 2016 03/24/23 2016 03/24/23 2016   98 5 °F (36 9 °C) 76 18 147/74 97 %      Temp Source Heart Rate Source Patient Position - Orthostatic VS BP Location FiO2 (%)   03/24/23 2018 03/24/23 2016 03/24/23 2016 03/24/23 2016 --   Oral Monitor Sitting Right arm       Pain Score       03/24/23 2301       6             Orthostatic Vital Signs  Vitals:    03/25/23 0100 03/25/23 0200 03/25/23 0300 03/25/23 0415   BP: 133/76 149/72 147/75 (!) 138/107   Pulse: 80 77 79 79   Patient Position - Orthostatic VS:           Physical Exam  Vitals and nursing note reviewed  Exam conducted with a chaperone present  Constitutional:       General: She is not in acute distress  Appearance: She is not diaphoretic  HENT:      Head: Normocephalic        Comments: Bilateral periorbital contusions from prior fall        Nose: Signs of injury and nasal tenderness present  No congestion or rhinorrhea  Right Nostril: No epistaxis, septal hematoma or occlusion  Left Nostril: No epistaxis, septal hematoma or occlusion  Mouth/Throat:      Mouth: Mucous membranes are moist       Pharynx: Oropharynx is clear  Eyes:      General:         Right eye: No discharge  Left eye: No discharge  Extraocular Movements: Extraocular movements intact  Conjunctiva/sclera: Conjunctivae normal    Cardiovascular:      Rate and Rhythm: Normal rate and regular rhythm  Pulses: Normal pulses  Heart sounds: Normal heart sounds  No murmur heard  No friction rub  No gallop  Pulmonary:      Effort: Pulmonary effort is normal  No respiratory distress  Breath sounds: Normal breath sounds  No wheezing, rhonchi or rales  Comments: Tenderness to palpation over right lower, lateral ribs  No underlying deformity appreciated  Chest:      Chest wall: Tenderness present  Abdominal:      General: Abdomen is flat  Bowel sounds are normal  There is no distension  Palpations: Abdomen is soft  Tenderness: There is no abdominal tenderness  Musculoskeletal:      Cervical back: Normal range of motion and neck supple  No tenderness  Right lower leg: No edema  Left lower leg: No edema  Comments: No calf swelling, erythema, or tenderness to palpation bilaterally  Lymphadenopathy:      Cervical: No cervical adenopathy  Skin:     General: Skin is warm and dry  Capillary Refill: Capillary refill takes less than 2 seconds  Neurological:      General: No focal deficit present  Mental Status: She is alert and oriented to person, place, and time     Psychiatric:         Mood and Affect: Mood normal          Behavior: Behavior normal          ED Medications  Medications   multi-electrolyte (PLASMALYTE-A/ISOLYTE-S PH 7 4) IV solution (75 mL/hr Intravenous New Bag 3/25/23 0359) acetaminophen (TYLENOL) tablet 975 mg (has no administration in time range)   lidocaine (LIDODERM) 5 % patch 1 patch (has no administration in time range)   oxyCODONE (ROXICODONE) split tablet 2 5 mg (has no administration in time range)   oxyCODONE (ROXICODONE) IR tablet 5 mg (has no administration in time range)   albuterol (PROVENTIL HFA,VENTOLIN HFA) inhaler 2 puff (has no administration in time range)   amLODIPine (NORVASC) tablet 5 mg (has no administration in time range)   DULoxetine (CYMBALTA) delayed release capsule 60 mg (has no administration in time range)   gabapentin (NEURONTIN) capsule 300 mg (has no administration in time range)   gabapentin (NEURONTIN) capsule 300 mg (has no administration in time range)   insulin lispro (HumaLOG) 100 units/mL subcutaneous injection 13 Units (has no administration in time range)   insulin lispro (HumaLOG) 100 units/mL subcutaneous injection 13 Units (has no administration in time range)   insulin lispro (HumaLOG) 100 units/mL subcutaneous injection 25 Units (has no administration in time range)   insulin detemir (LEVEMIR) subcutaneous injection 50 Units (has no administration in time range)   levothyroxine tablet 112 mcg (has no administration in time range)   metoprolol tartrate (LOPRESSOR) tablet 50 mg (has no administration in time range)   losartan (COZAAR) tablet 100 mg (has no administration in time range)   pantoprazole (PROTONIX) EC tablet 40 mg (has no administration in time range)   pramipexole (MIRAPEX) tablet 0 25 mg (has no administration in time range)   spironolactone (ALDACTONE) tablet 25 mg (has no administration in time range)   torsemide (DEMADEX) tablet 20 mg (has no administration in time range)   insulin lispro (HumaLOG) 100 units/mL subcutaneous injection 1-6 Units (has no administration in time range)   heparin (porcine) subcutaneous injection 5,000 Units (has no administration in time range)   HYDROmorphone (DILAUDID) tablet 1 mg (1 mg Oral Given 3/24/23 2301)   sodium chloride 0 9 % bolus 500 mL (0 mL Intravenous Stopped 3/25/23 0304)   iohexol (OMNIPAQUE) 350 MG/ML injection (SINGLE-DOSE) 52 mL (52 mL Intravenous Given 3/25/23 0212)       Diagnostic Studies  Results Reviewed     Procedure Component Value Units Date/Time    HS Troponin I 2hr [[id number]]  (Normal) Collected: 03/25/23 0125    Lab Status: Final result Specimen: Blood from Line, Venous Updated: 03/25/23 0229     hs TnI 2hr 5 ng/L      Delta 2hr hsTnI -1 ng/L     D-Dimer [[id number]]  (Abnormal) Collected: 03/24/23 2323    Lab Status: Final result Specimen: Blood from Arm, Left Updated: 03/25/23 0058     D-Dimer, Quant 0 82 ug/ml FEU     Narrative: In the evaluation for possible pulmonary embolism, in the appropriate (Well's Score of 4 or less) patient, the age adjusted d-dimer cutoff for this patient can be calculated as:    Age x 0 01 (in ug/mL) for Age-adjusted D-dimer exclusion threshold for a patient over 50 years  Maranda Sheliaelva [[id number]]  (Normal) Collected: 03/24/23 2323    Lab Status: Final result Specimen: Blood from Arm, Left Updated: 03/25/23 0055     Protime 12 1 seconds      INR 0 88    APTT [[id number]]  (Normal) Collected: 03/24/23 2323    Lab Status: Final result Specimen: Blood from Arm, Left Updated: 03/25/23 0055     PTT 24 seconds     Lactic acid [[id number]]  (Normal) Collected: 03/24/23 2323    Lab Status: Final result Specimen: Blood from Arm, Left Updated: 03/25/23 0017     LACTIC ACID 1 3 mmol/L     Narrative:      Result may be elevated if tourniquet was used during collection      HS Troponin 0hr (reflex protocol) [[id number]]  (Normal) Collected: 03/24/23 2323    Lab Status: Final result Specimen: Blood from Arm, Left Updated: 03/25/23 0006     hs TnI 0hr 6 ng/L     B-Type Natriuretic Peptide(BNP) [[id number]]  (Normal) Collected: 03/24/23 2323    Lab Status: Final result Specimen: Blood from Arm, Left Updated: 03/25/23 0005     BNP 35 pg/mL Comprehensive metabolic panel [[id number]]  (Abnormal) Collected: 03/24/23 2323    Lab Status: Final result Specimen: Blood from Arm, Left Updated: 03/24/23 2358     Sodium 138 mmol/L      Potassium 3 5 mmol/L      Chloride 96 mmol/L      CO2 32 mmol/L      ANION GAP 10 mmol/L      BUN 49 mg/dL      Creatinine 2 34 mg/dL      Glucose 138 mg/dL      Calcium 10 0 mg/dL      AST 28 U/L      ALT 24 U/L      Alkaline Phosphatase 57 U/L      Total Protein 6 5 g/dL      Albumin 3 7 g/dL      Total Bilirubin 0 31 mg/dL      eGFR 20 ml/min/1 73sq m     Narrative:      National Kidney Disease Foundation guidelines for Chronic Kidney Disease (CKD):   •  Stage 1 with normal or high GFR (GFR > 90 mL/min/1 73 square meters)  •  Stage 2 Mild CKD (GFR = 60-89 mL/min/1 73 square meters)  •  Stage 3A Moderate CKD (GFR = 45-59 mL/min/1 73 square meters)  •  Stage 3B Moderate CKD (GFR = 30-44 mL/min/1 73 square meters)  •  Stage 4 Severe CKD (GFR = 15-29 mL/min/1 73 square meters)  •  Stage 5 End Stage CKD (GFR <15 mL/min/1 73 square meters)  Note: GFR calculation is accurate only with a steady state creatinine    Blood gas, venous [[id number]]  (Abnormal) Collected: 03/24/23 2323    Lab Status: Final result Specimen: Blood from Arm, Left Updated: 03/24/23 2349     pH, Tato 7 399     pCO2, Tato 50 4 mm Hg      pO2, Tato 38 6 mm Hg      HCO3, Tato 30 4 mmol/L      Base Excess, Tato 4 6 mmol/L      O2 Content, Tato 13 5 ml/dL      O2 HGB, VENOUS 72 2 %     CBC and differential [[id number]] Collected: 03/24/23 2323    Lab Status: Final result Specimen: Blood from Arm, Left Updated: 03/24/23 2338     WBC 10 16 Thousand/uL      RBC 4 25 Million/uL      Hemoglobin 12 6 g/dL      Hematocrit 38 8 %      MCV 91 fL      MCH 29 6 pg      MCHC 32 5 g/dL      RDW 14 7 %      MPV 11 4 fL      Platelets 720 Thousands/uL      nRBC 0 /100 WBCs      Neutrophils Relative 67 %      Immat GRANS % 1 %      Lymphocytes Relative 23 %      Monocytes Relative 6 % Eosinophils Relative 3 %      Basophils Relative 0 %      Neutrophils Absolute 6 87 Thousands/µL      Immature Grans Absolute 0 05 Thousand/uL      Lymphocytes Absolute 2 31 Thousands/µL      Monocytes Absolute 0 61 Thousand/µL      Eosinophils Absolute 0 29 Thousand/µL      Basophils Absolute 0 03 Thousands/µL     Blood culture #2 [[id number]] Collected: 03/24/23 2324    Lab Status: In process Specimen: Blood from Arm, Left Updated: 03/24/23 2333    Blood culture #1 [[id number]] Collected: 03/24/23 2324    Lab Status: In process Specimen: Blood from Arm, Right Updated: 03/24/23 2333                 CTA ED chest PE Study   Final Result by Ric Diaz DO (03/25 0325)      No evidence of pulmonary artery embolus      Unchanged right anterior 7th and ribs buckle fractures            Workstation performed: [id number]         XR chest 2 views   ED Interpretation by Анна Keller DO (03/24 2311)   No pneumothorax, pleural effusion, or pulmonary edema  RML with subtle opacification of entire lobe               Procedures  ECG 12 Lead Documentation Only    Date/Time: 3/24/2023 11:09 PM  Performed by: Анна Keller DO  Authorized by: Анна Keller DO     Indications / Diagnosis:  Chest pain  ECG reviewed by me, the ED Provider: yes    Patient location:  ED  Previous ECG:     Previous ECG:  Compared to current    Comparison ECG info:  01/03/2023    Similarity:  No change  Interpretation:     Interpretation: non-specific    Rate:     ECG rate:  77    ECG rate assessment: normal    Rhythm:     Rhythm: sinus rhythm    Ectopy:     Ectopy: none    QRS:     QRS axis:  Normal    QRS intervals:  Normal  Conduction:     Conduction: normal    ST segments:     ST segments:  Normal  T waves:     T waves: flattening      Flattening:  V2, V3, III and aVL          ED Course  ED Course as of 03/25/23 0457   Fri Mar 24, 2023   2230 Hannah Holden is a 67 yof recently discharged after hospitalization for several injuries s/p fall who presents with worsening right lower rib/chest pain, exertional dyspnea, intermittent hypoxia on home SpO2, minor cough, and episode of left shoulder pressure with radiation into the left upper arm(arm pressure lasted <10 minutes, occurred while riding in vehicle on way to ED, had no accompanying symptoms, spontaneously resolved)  VS stable with exception to intermittent hypoxia with movement, physical exam with known facial injuries, tenderness to palpation of right lower ribs which does not completely reproduce pain, LS CTA bilaterally, abd soft/nontender, no calf swelling/erythema/tenderness  Given HPI, ROS, and exam, d/dx includes but is not limited to ACS, PE, pneumonia, pneumothorax, musculoskeletal thoracic pain, pulmonary contusion, and infection  Will obtain labs including d dimer, cardiac labs, VBG, and infectious workup, consider CT PE study if d dimer elevated  CXR obtained, concerning for RML infiltrate vs atelectasis vs contusion vs less likely infarct  Will order oxygen to maintain SpO2 >92%, give pain medication as recommended by APS prior to d/c   ECG ordered  2312 XR chest 2 views  No pneumothorax, pleural effusion, or pulmonary edema  RML with subtle opacification of entire lobe as per my interpretation  2317 ECG NSR, rate 77, without evidence of ischemia, infarct, abnormal interval, or arrhythmia     2351 Blood gas, venous(!)  No acidosis, mild hypercarbia  2358 SpO2 88% on room air at rest with good pleth, placed on 2L oxygen nasal cannula  Sat Mar 25, 2023   0000 CBC and differential  WBC, H/H WNL    0001 Creatinine(!): 2 34  Baseline 1 49-2  12  Not elevated significantly enough to be considered ANDRA  0019 LACTIC ACID: 1 3   0019 BNP: 35   0019 hs TnI 0hr: 6  Will obtain 2 hour repeat  0045 D-dimer delayed due to analyzer issue   Anticipated result time 0130    0057 Protime-INR  WNL      0102 D-Dimer, Quant(!): 0 82  Elevated, however pt has GFR <30     0111 Spoke with Dr Callie Wallis, trauma surgeon, who states that regardless of GFR, patient will require CT PE study given hypoxia and shortness of breath, as he would obtain the imaging once admitted to his service for rib fractures if it was not done prior to admission  CT chest wo contrast discontinued, will order CT PE study, speak with radiology, and give pre/post contrast fluid boluses  2328 Per Dr Long Wolf, radiologist, no need for radiologist permission for PE study given it is a life threatening condition as long as 2 providers document necessity     1178 CTA ED chest PE Study  IMPRESSION:     No evidence of pulmonary artery embolus     Unchanged right anterior 7th and ribs buckle fractures                HEART Risk Score    Flowsheet Row Most Recent Value   Heart Score Risk Calculator    History 1 Filed at: 03/25/2023 0020   ECG 1 Filed at: 03/25/2023 0020   Age 2 Filed at: 03/25/2023 0020   Risk Factors 2 Filed at: 03/25/2023 0020   Troponin 0 Filed at: 03/25/2023 0020   HEART Score 6 Filed at: 03/25/2023 0020              PERC Rule for PE    Flowsheet Row Most Recent Value   PERC Rule for PE    Age >=50 1 Filed at: 03/25/2023 0105   HR >=100 0 Filed at: 03/25/2023 0105   O2 Sat on room air < 95% 1 Filed at: 03/25/2023 0105   History of PE or DVT 0 Filed at: 03/25/2023 0105   Recent trauma or surgery 1 Filed at: 03/25/2023 0105   Hemoptysis 0 Filed at: 03/25/2023 0105   Exogenous estrogen 0 Filed at: 03/25/2023 0105   Unilateral leg swelling 0 Filed at: 03/25/2023 0105   PERC Rule for PE Results 3 Filed at: 03/25/2023 0105                  Wells' Criteria for PE    Flowsheet Row Most Recent Value   Wells' Criteria for PE    Clinical signs and symptoms of DVT 0 Filed at: 03/25/2023 0104   PE is primary diagnosis or equally likely 3 Filed at: 03/25/2023 0104   HR >100 0 Filed at: 03/25/2023 0104   Immobilization at least 3 days or Surgery in the previous 4 weeks 0 Filed at: 03/25/2023 0104   Previous, objectively diagnosed PE or DVT 0 Filed at: 03/25/2023 0104   Hemoptysis 0 Filed at: 03/25/2023 0104   Malignancy with treatment within 6 months or palliative 0 Filed at: 03/25/2023 0104   Wells' Criteria Total 3 Filed at: 03/25/2023 0104            Medical Decision Making  Pt required 1-2 L supplemental oxygen throughout ED course to maintain SpO2 >92% at rest   See ED course for MDM  Delta trop -1, doubt ACS  Given IV hydration pre and post CT for renal protection  Trauma service accepted the patient for observation admission given hypoxemia likely 2/2 rib fractures  Stable on admission  Amount and/or Complexity of Data Reviewed  Labs: ordered  Decision-making details documented in ED Course  Radiology: ordered and independent interpretation performed  Decision-making details documented in ED Course  Risk  Prescription drug management  Decision regarding hospitalization              Disposition  Final diagnoses:   Dyspnea on exertion   Hypoxemia   Closed fracture of multiple ribs of right side, initial encounter     Time reflects when diagnosis was documented in both MDM as applicable and the Disposition within this note     Time User Action Codes Description Comment    3/25/2023  3:33 AM Cleola Krystal Add [R06 09] Dyspnea on exertion     3/25/2023  3:33 AM Cleola Krystal Add [R09 02] Hypoxemia     3/25/2023  3:33 AM Cleola Krystal Modify [R06 09] Dyspnea on exertion     3/25/2023  3:33 AM Cleola Krystal Modify [R09 02] Hypoxemia     3/25/2023  3:33 AM Cleola Krystal Add [S22 41XA] Closed fracture of multiple ribs of right side, initial encounter     3/25/2023  4:29 AM Darrel Gonzalez Add [S22 41XD] Closed fracture of multiple ribs of right side with routine healing, subsequent encounter       ED Disposition     ED Disposition   Admit    Condition   Stable    Date/Time   Sat Mar 25, 2023  3:35 AM    Comment   Case was discussed with Dr Reilly Terry and the patient's admission status was agreed to be Admission Status:  observation status to the service of Dr Bri Ashley   Follow-up Information    None         Current Discharge Medication List      CONTINUE these medications which have NOT CHANGED    Details   acetaminophen (TYLENOL) 325 mg tablet Take 3 tablets (975 mg total) by mouth every 8 (eight) hours  Refills: 0    Associated Diagnoses: Multiple closed fractures of ribs of right side      albuterol (Ventolin HFA) 90 mcg/act inhaler Inhale 2 puffs every 6 (six) hours as needed for wheezing  Qty: 54 g, Refills: 0    Comments: Substitution to a formulary equivalent within the same pharmaceutical class is authorized  Associated Diagnoses: Asthma, unspecified asthma severity, unspecified whether complicated, unspecified whether persistent      amLODIPine (NORVASC) 5 mg tablet Take 1 tablet (5 mg total) by mouth daily  Qty: 90 tablet, Refills: 0    Associated Diagnoses: Hypertensive chronic kidney disease with stage 1 through stage 4 chronic kidney disease, or unspecified chronic kidney disease; Essential hypertension; Chronic kidney disease, stage III (moderate) ([facility])      b complex vitamins capsule Take 1 capsule by mouth daily      B-D ULTRAFINE III SHORT PEN 31G X 8 MM MISC USE AS DIRECTED 4 TIMES PER DAY  Refills: 3      Calcium Citrate 1040 MG TABS Take 500 mg by mouth Three times a day      DULoxetine (CYMBALTA) 30 mg delayed release capsule Take 60 mg by mouth daily      gabapentin (Neurontin) 300 mg capsule Take one cap PO in the morning and afternoon and two caps PO in the evening  Qty: 120 capsule, Refills: 0    Associated Diagnoses: Herpes zoster without complication      HYDROmorphone (DILAUDID) 2 mg tablet Take 1 mg to 2 mg PO every 4 hours as needed for moderate to severe pain  Ongoing therapy    Qty: 20 tablet, Refills: 0    Associated Diagnoses: Multiple closed fractures of ribs of right side      insulin aspart (NovoLOG) 100 units/mL injection Inject under the skin 3 (three) times a day before meals 16 units, 16 units, 25 units  insulin detemir (Levemir FlexTouch) 100 Units/mL injection pen Inject 50 Units under the skin every evening       levothyroxine 112 mcg tablet Take 112 mcg by mouth every morning      metoprolol tartrate (LOPRESSOR) 50 mg tablet TAKE 1 TABLET (50 MG TOTAL) BY MOUTH EVERY 12 (TWELVE) HOURS  Qty: 180 tablet, Refills: 3    Associated Diagnoses: Essential hypertension      olmesartan (BENICAR) 40 mg tablet Take 1 tablet (40 mg total) by mouth daily at bedtime  Qty: 90 tablet, Refills: 3    Associated Diagnoses: Hypertensive chronic kidney disease with stage 1 through stage 4 chronic kidney disease, or unspecified chronic kidney disease; Stage 4 chronic kidney disease ([facility] ); Anemia in stage 4 chronic kidney disease ([facility] ); Diabetic nephropathy associated with type 2 diabetes mellitus ([facility] ); Vitamin D deficiency; Persistent proteinuria; Dyslipidemia; Liver function abnormality      pantoprazole (PROTONIX) 40 mg tablet TAKE 1 TABLET BY MOUTH TWICE A DAY  Qty: 180 tablet, Refills: 2    Associated Diagnoses: Gastroesophageal reflux disease, unspecified whether esophagitis present      pramipexole (MIRAPEX) 0 25 mg tablet Take 1 tablet (0 25 mg total) by mouth daily at bedtime  Qty: 90 tablet, Refills: 2    Associated Diagnoses: RLS (restless legs syndrome)      Probiotic Product (PROBIOTIC PO) Take 1 capsule by mouth 2 (two) times a day      sodium chloride (OCEAN) 0 65 % nasal spray 1 spray into each nostril every hour as needed for congestion  Qty: 15 mL, Refills: 0    Associated Diagnoses: Multiple closed fractures of ribs of right side      spironolactone (ALDACTONE) 25 mg tablet Take 1 tablet (25 mg total) by mouth daily  Qty: 90 tablet, Refills: 3    Associated Diagnoses: Primary hypertension;  Hypertensive chronic kidney disease with stage 1 through stage 4 chronic kidney disease, or unspecified chronic kidney disease; Stage 4 chronic kidney disease ([facility] ) torsemide (DEMADEX) 20 mg tablet TAKE 1 TABLET BY MOUTH EVERY DAY  Qty: 90 tablet, Refills: 3    Associated Diagnoses: Hypertensive chronic kidney disease with stage 1 through stage 4 chronic kidney disease, or unspecified chronic kidney disease; CKD (chronic kidney disease) stage 3, GFR 30-59 ml/min ([facility])      Vitamin D, Ergocalciferol, 2000 units CAPS Take 2,000 Units by mouth daily            No discharge procedures on file  PDMP Review       Value Time User    PDMP Reviewed  Yes 3/24/2023  9:59 PM Melissa Chao MD           ED Provider  Attending physically available and evaluated Jean Metzger I managed the patient along with the ED Attending      Electronically Signed by         Rita Sheriff DO  03/25/23 8761

## 2023-03-25 NOTE — PHYSICAL THERAPY NOTE
"                                                                                  PHYSICAL THERAPY EVALUATION NOTE    Patient Name: Yuan Erwin  [id number] Date: 3/25/2023  AGE:   67 y o  Mrn:   [id number]  ADMIT DX:  Hypoxemia [R09 02]  SOB (shortness of breath) [R06 02]  Dyspnea on exertion [R06 09]  Closed fracture of multiple ribs of right side, initial encounter [S22 41XA]    Past Medical History:   Diagnosis Date    Allergic     Allergic rhinitis     Anesthesia     pt reports \"had to use double lumen endo tube for hiatal hernia repair/so surgeon could get to where he needed to work\"    Arthritis     Aspiration into airway     Basal cell carcinoma 2007    left cheek     BCC (basal cell carcinoma) 05/27/2021    Left Nasal tip    Cancer ([facility] )     squamous cell cancer on forhead    Cancer ([facility] [facility] )     basal cell on nose     Chronic kidney disease 2000, 2018    Stones, kidney disease stage 4    Chronic pain disorder     bilat feet and joint pain on occas    Colon polyp     Constipation     COPD (chronic obstructive pulmonary disease) ([facility] )     COVID-19 08/2021    recovered at home/did receive monoclonal infusion    Dental bridge present     Depression     Diabetes mellitus ([facility] )     Type 2    Diabetic neuropathy ([facility] )     Disease of thyroid gland     Family history of thyroid problem     Fatty liver     GERD (gastroesophageal reflux disease)     Heart burn     Hiatal hernia     History of pneumonia     Hyperlipidemia     Hyperplasia, parathyroid ([facility] )     Hypertension     Kidney problem     Kidney stone     Memory loss Julu2 2020    Motion sickness     Motion sickness     Neck pain     Obesity 1978    Obesity (BMI 30 0-34  9)     Pollen allergies     RLS (restless legs syndrome)     SCC (squamous cell carcinoma) 05/04/2021    left mid forehead    Seasonal allergies     Sleep apnea     has inspire    Squamous cell skin cancer 2007    left cheek     Swollen ankles     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, left     great toe " fusion    Urinary incontinence     Urinary tract infection 03/28/2022    Wears glasses      Length Of Stay: 0  PHYSICAL THERAPY EVALUATION :    03/25/23 0943   PT Last Visit   PT Visit Date 03/25/23   Pain Assessment   Pain Assessment Tool 0-10   Pain Score 3   Pain Location/Orientation Orientation: Right;Location: Rib Cage   Hospital Pain Intervention(s) Repositioned; Ambulation/increased activity   Restrictions/Precautions   Other Precautions Multiple lines; Fall Risk;Pain;Visual impairment  ([place])   Home Living   Type of [address] [place] [address] Two level; Able to live on main level with bedroom/bathroom; Other (Comment)  (no TRISTIN)   Additional Comments lives w/ spouse and son  ambulates w/ rollator as needed  independent w/ ADLs  receives assistance w/ IADLs  1 fall in last 6 months  General   Additional Pertinent History 3/25/23 at 4:14 blood pressure was 138/107  3/24/23 at 23:57 room air pulse ox was 88%   Family/Caregiver Present No   Cognition   Overall Cognitive Status WFL   Arousal/Participation Alert   Orientation Level Oriented to person; Other (Comment)  (pt was identified w/ full name, birth date)   Following Commands Follows one step commands without difficulty   Comments room air resting pulse ox 93% and 84 BPM, active 91% and 88 BPM    Subjective   Subjective pt seen supine in bed  agreed to PT eval  states having right ribs pain  RUE Assessment   RUE Assessment WFL   LUE Assessment   LUE Assessment WFL   RLE Assessment   RLE Assessment WFL  (4- to 4/5)   LLE Assessment   LLE Assessment WFL  (4- to 4/5)   Vision-Basic Assessment   Current Vision Other (Comment)  (pt wears glasses but they are broken)   Patient Visual Report Diplopia   Light Touch   RLE Light Touch Grossly intact   LLE Light Touch Grossly intact   Bed Mobility   Supine to Sit 7  Independent   Additional items HOB elevated; Increased time required   Sit to Supine 7  Independent   Additional items HOB elevated; Increased time required Transfers   Sit to Stand 5  Supervision   Additional items Increased time required;Verbal cues  (for hand placement)   Stand to Sit 5  Supervision   Additional items Increased time required  (for hand placement)   Ambulation/Elevation   Gait pattern Foward flexed; Short stride   Gait Assistance 5  Supervision   Assistive Device Rolling walker   Distance 90 feet x2, 30 feet  w/ rest breaks  (additional not was not possible due to fatigue, pain)   Ambulation/Elevation Additional Comments Comfortable Gait Speed: 0 49 m/s   Balance   Static Sitting Good   Dynamic Sitting Fair   Static Standing Fair  (w/ roller walker)   Ambulatory Fair -  (w/ roller walker)   Activity Tolerance   Activity Tolerance Patient limited by fatigue;Patient limited by pain   Nurse Made Aware spoke to [address] [place], [place] [address]    Assessment   Problem List Decreased strength;Decreased endurance; Impaired balance;Decreased mobility;Pain   Assessment Pt presents with shortness of breath  Reports she was in the hospital from 3/22/23-3/23 after a fall, was diagnosed with right sided rib fractures and facial fractures and she was feeling well, so she was discharged  Pt states over the past 24 hours, she has had worsening right sided chest wall pain, difficulty taking deep breaths, and severe pain with coughing  Dx: acute on chronic hypoxic respiratory failure, ARF on CKD, right 7th and 8th rib fxs, right orbital wall fx, COPD, and DM  order placed for PT eval and tx, w/ activity order of out of bed to chair  pt presents w/ comorbidities of COPD, CKD, chronic pain disorder, DM, neuropathy, hyperlipidemia, obesity, and arthritis and personal factors of mobilizing w/ assistive device, positive fall history, depression, inability to perform IADLs and readmission to the hospital  pt presents w/ pain, weakness, decreased endurance, impaired balance, gait deviations and fall risk   these impairments are evident in findings from physical examination (weakness), mobility assessment (need for standby assist w/ all phases of mobility when usually mobilizing independently, tolerance to only 90 feet of ambulation and need for cueing for mobility technique), and Barthel Index: 75/100 and Comfortable Gait Speed: 0 49 m/s (less than 1 0 m/s indicates need for intervention to address falls risk)  pt needed input for mobility technique  pt is at risk for falls due to physical and safety awareness deficits  pt's clinical presentation is unstable/unpredictable (evident in poor blood pressure control, need for assist w/ all phases of mobility when usually mobilizing independently, tolerance to only 90 feet of ambulation, pain impacting overall mobility status, declining oxygen saturation w/ low level of activity and need for input for mobility technique/safety)  pt needs inpatient PT tx to improve mobility deficits and progress mobility training as appropriate  discharge recommendation is for outpatient PT to reduce fall risk and maximize level of functional independence  Goals   Patient Goals go home  breathe better  STG Expiration Date 04/04/23   Short Term Goal #1 pt will: Increase bilateral LE strength 1/2 grade to facilitate independent mobility, Perform all transfers independently to improve independence, Ambulate 300 ft  with least restrictive assistive device independently w/o LOB to improve functional independence, Increase ambulatory balance 1/2 grade to decrease risk for falls, Complete exercise program independently to increase strength and endurance, Tolerate 3 hr OOB to faciliate upright tolerance, Improve gait speed to 0 59 m/s to reduce fall risk and increase independence and Improve Barthel Index score to 90 or greater to facilitate independence   PT Treatment Day 0   Plan   Treatment/Interventions Functional transfer training;LE strengthening/ROM; Therapeutic exercise; Endurance training;Gait training;Patient/family training;Equipment eval/education   PT Frequency 2-3x/wk   Recommendation   PT Discharge Recommendation Home with outpatient rehabilitation   Additional Comments recommend roller walker/rollator use w/ mobility   AM-PAC Basic Mobility Inpatient   Turning in Flat Bed Without Bedrails 4   Lying on Back to Sitting on Edge of Flat Bed Without Bedrails 4   Moving Bed to Chair 3   Standing Up From Chair Using Arms 3   Walk in Room 3   Climb 3-5 Stairs With Railing 3   Basic Mobility Inpatient Raw Score 20   Basic Mobility Standardized Score 43 99   Highest Level Of Mobility   -HLM Goal 6: Walk 10 steps or more   JH-HLM Achieved 7: Walk 25 feet or more   Barthel Index   Feeding 10   Bathing 0   Grooming Score 5   Dressing Score 10   Bladder Score 10   Bowels Score 10   Toilet Use Score 10   Transfers (Bed/Chair) Score 10   Mobility (Level Surface) Score 10   Stairs Score 0   Barthel Index Score 75   End of Consult   Patient Position at End of Consult Seated edge of bed; All needs within reach     The patient's AM-PAC Basic Mobility Inpatient Short Form Raw Score is 20  A Raw score of greater than 16 suggests the patient may benefit from discharge to home  Please also refer to the recommendation of the Physical Therapist for safe discharge planning  Comfortable Gait Speed: 0 49 m/s  Gait Speed Interpretation:  Gain of 0 1 m/s is a predictor of well-being in those w/ abnormal walking speed compared to age-patched peers  <0 7m/s is at an increased risk for falls    Household ambulator: <0 4 m/s  Limited community ambulator: 0 4-0 8 m/s  [employer] ambulator: 0 8-1 2 m/s  Able to safely cross streets: >1 2 m/s     Skilled PT recommended while in hospital and upon DC to progress pt toward treatment goals       Braden Douglas, PT

## 2023-03-25 NOTE — ASSESSMENT & PLAN NOTE
Lab Results   Component Value Date    HGBA1C 6 8 (H) 03/07/2023       Recent Labs     03/22/23  2113 03/23/23  0726 03/23/23  1114   POCGLU 251* 234* 222*       Blood Sugar Average: Last 72 hrs:    - Continue home insulin regimen:   - 13 units of mealtime insulin for breakfast and lunch, 25 units for dinner   - 50 units of Lantus QHS  - Sliding scale insulin added

## 2023-03-25 NOTE — OCCUPATIONAL THERAPY NOTE
"    Occupational Therapy Evaluation     Patient Name: Steve Cassidy  FYCXT'S Date: 3/25/2023  Problem List  Principal Problem:    Acute on chronic respiratory failure with hypoxemia [facility])  Active Problems:    Type 2 diabetes mellitus with diabetic nephropathy, with long-term current use of insulin (HCC)    COPD (chronic obstructive pulmonary disease) (HCC)    Fracture of orbital floor, blow-out, right, closed, with routine healing, subsequent encounter    Multiple closed fractures of ribs of right side    Acute renal failure superimposed on stage 4 chronic kidney disease ([address] )    Past Medical History  Past Medical History:   Diagnosis Date    Allergic     Allergic rhinitis     Anesthesia     pt reports \"had to use double lumen endo tube for hiatal hernia repair/so surgeon could get to where he needed to work\"    Arthritis     Aspiration into airway     Basal cell carcinoma 2007    left cheek     BCC (basal cell carcinoma) 05/27/2021    Left Nasal tip    Cancer ([address] )     squamous cell cancer on forhead    Cancer ([address] )     basal cell on nose     Chronic kidney disease 2000, 2018    Stones, kidney disease stage 4    Chronic pain disorder     bilat feet and joint pain on occas    Colon polyp     Constipation     COPD (chronic obstructive pulmonary disease) ([address] )     COVID-19 08/2021    recovered at home/did receive monoclonal infusion    Dental bridge present     Depression     Diabetes mellitus ([address] )     Type 2    Diabetic neuropathy ([address] )     Disease of thyroid gland     Family history of thyroid problem     Fatty liver     GERD (gastroesophageal reflux disease)     Heart burn     Hiatal hernia     History of pneumonia     Hyperlipidemia     Hyperplasia, parathyroid ([address] )     Hypertension     Kidney problem     Kidney stone     Memory loss Julu2 2020    Motion sickness     Motion sickness     Neck pain     Obesity 1978    Obesity (BMI 30 0-34  9)     Pollen allergies     RLS (restless legs syndrome)     SCC (squamous " cell carcinoma) 05/04/2021    left mid forehead    Seasonal allergies     Sleep apnea     has inspire    Squamous cell skin cancer 2007    left cheek     Swollen ankles     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, left     great toe fusion    Urinary incontinence     Urinary tract infection 03/28/2022    Wears glasses      Past Surgical History  Past Surgical History:   Procedure Laterality Date    ARTHROSCOPY KNEE      BREAST BIOPSY      stereotactic-benign    BREAST BIOPSY      stereo-benign    BREAST EXCISIONAL BIOPSY      unknown date-benign    BREAST EXCISIONAL BIOPSY      unknown date-benign    BREAST EXCISIONAL BIOPSY      unknown date-benign    BREAST EXCISIONAL BIOPSY      unknown age-benign    CARDIAC CATHETERIZATION      CHOLECYSTECTOMY      COLONOSCOPY      EXAMINATION UNDER ANESTHESIA N/A 06/24/2021    Procedure: EXAM UNDER ANESTHESIA (EUA), DISE;  Surgeon: Ryne Gomez MD;  [facility];  Service: ENT    HERNIA REPAIR      HIATAL HERNIA REPAIR      KNEE SURGERY      Torn maniscus lap surg    LIPOSUCTION      LYMPH NODE BIOPSY      MOHS SURGERY  05/20/2021    left mid forehead-Gautam    MOHS SURGERY Left 05/27/2021    Left nasal tip- gautam     GA LIGATION/BIOPSY TEMPORAL ARTERY Left 1/5/2023    Procedure: BIOPSY ARTERY TEMPORAL;  Surgeon: Satish Foster DO;  [facility];  Service: Vascular    GA OPEN IMPLANTATION CRANIAL NERVE VASQUEZ & PULSE GEN N/A 11/10/2021    Procedure: INSERTION UPPER AIRWAY STIMULATOR, INSPIRE IMPLANT;  Surgeon: Ryne Gomez MD;  [facility];  Service: ENT    GA UNLISTED PROCEDURE FOOT/TOES Right 07/19/2022    Procedure: 1st metatarsal phalangeal joint fusion;  Surgeon: Florinda Rodriguez DPM;  Location[facility];  Service: [address] [place] [address] Drive Bilateral 2000    REDUCTION MAMMAPLASTY      SKIN BIOPSY      SKIN CANCER EXCISION  2007    squamous cell carcinoma     SKIN CANCER EXCISION  2007    basal cell carcinoma    SQUAMOUS CELL CARCINOMA EXCISION      TOE SURGERY Right 08/04/2022    Right Great Toe Fusion    TONSILLECTOMY      TUBAL LIGATION      UPPER GASTROINTESTINAL ENDOSCOPY      US GUIDED BREAST BIOPSY RIGHT COMPLETE Right 07/18/2022 03/25/23 1448   Note Type   Note type Evaluation   Pain Assessment   Pain Assessment Tool FLACC   Pain Rating: FLACC (Rest) - Face 0   Pain Rating: FLACC (Rest) - Legs 0   Pain Rating: FLACC (Rest) - Activity 0   Pain Rating: FLACC (Rest) - Cry 0   Pain Rating: FLACC (Rest) - Consolability 0   Score: FLACC (Rest) 0   Pain Rating: FLACC (Activity) - Face 0   Pain Rating: FLACC (Activity) - Legs 0   Pain Rating: FLACC (Activity) - Activity 0   Pain Rating: FLACC (Activity) - Cry 0   Pain Rating: FLACC (Activity) - Consolability 0   Score: FLACC (Activity) 0   Restrictions/Precautions   Other Precautions Multiple lines; Fall Risk; Chair Alarm; Bed Alarm  (2 L NC)   Home Living   Type of Home House  (0 TRISTIN)   Home Layout Two level   Bathroom Shower/Tub Walk-in shower   Bathroom Toilet Standard   Bathroom Equipment Built-in shower seat   Home Equipment Walker;Cane   Additional Comments Patient was lethargic and poor historian  Per 1/23 evaluation patient lives [facility] 2 level 0 TRISTIN  Used no AD to ambulate   Prior Function   Level of [place] Independent with ADLs; Needs assistance with IADLS   Lives With Spouse   Receives Help From [facility] in the last 6 months 1 to 4   Comments Par patient she is Independent with ADL and receives assistance with IADL     Subjective   Subjective (S)  Patient was D/C days prior after a fall she sustained right orbital fracture, nasal bone fracture, and right sided rib 7 and 8 buckle fractures,   ADL   Where Assessed Edge of bed   Eating Assistance 7  Independent   Grooming Assistance 5  Supervision/Setup   UB Bathing Assistance 5  Supervision/Setup   UB Dressing Assistance 5  Supervision/Setup   LB Dressing Assistance 5  Supervision/Setup   LB Dressing Deficit Don/doff R sock;Don/doff L sock   Additional Comments Pt was recieved with NC out of nose therapist adjusted the NC  Patient was educated throughout ADL with breathing activity as O2 level flucuated between 95-85%   Bed Mobility   Supine to Sit 6  Modified independent   Additional items HOB elevated; Increased time required  (log roll)   Sit to Supine 6  Modified independent   Additional items   (log roll)   Additional Comments Patient 02 level dropped to 85% and was given breathing exercise and recovered to 95%   Transfers   Sit to Stand 5  Supervision   Additional items Assist x 1; Increased time required;Verbal cues   Stand to Sit 5  Supervision   Additional items Assist x 1; Increased time required;Verbal cues   Functional Mobility   Additional Comments Patient ambuleted 8-10 steps with RW at   Balance   Static Sitting Good   Dynamic Sitting Fair +   Static Standing Fair   Dynamic Standing Fair -   Activity Tolerance   Activity Tolerance Patient limited by fatigue   Nurse Made Aware RN Rise Palin   RUE Assessment   RUE Assessment WFL   LUE Assessment   LUE Assessment WFL   Cognition   Orientation Level Oriented to person;Disoriented to place; Disoriented to time;Disoriented to situation   Following Commands Follows one step commands with increased time or repetition   Comments Pt ID by wristband name and    Cognition Assessment Tools (S)    (Patient orientation to time, place and situation and home set up was difficlt for patient recall administed mini coginitive assessment see below )   Assessment   Limitation Decreased ADL status; Decreased Safe judgement during ADL;Decreased self-care trans;Decreased high-level ADLs; Decreased endurance   Prognosis Fair   Assessment Patient evaluated by Occupational Therapy  Patient admitted with Acute on chronic respiratory failure with hypoxemia ([facility] )    The patients occupational profile, medical and therapy history includes a expanded review of medical and/or therapy records and additional review of physical, cognitive, or psychosocial history related to current functional performance  Comorbidities affecting functional mobility and ADLS include: cancer, COPD, diabetes and hypertension  Prior to admission, patient was independent with functional mobility without assistive device, independent with ADLS, requiring assist for IADLS and living with spouse in a 2 level home with 0 steps to enter  The evaluation identifies the following performance deficits: weakness, impaired balance, decreased endurance, decreased coordination, increased fall risk, new onset of impairment of functional mobility, decreased ADLS, decreased IADLS, decreased activity tolerance, decreased safety awareness and SOB upon exertion, that result in activity limitations and/or participation restrictions  This evaluation requires clinical decision making of moderate complexity, because the patient may present with comorbidities that affect occupational performance and required minimal or moderate modification of tasks or assistance with the consideration of several treatment options  The Barthel Index was used as a functional outcome tool presenting with a score of Barthel Index Score: 70,The patient's raw score on the AM-PAC Daily Activity Inpatient Short Form is 19  A raw score of greater than or equal to 19 suggests the patient may benefit from discharge to home with home health OT  Patient presented difficulty recalling home set up and orientation to time and place and situation  The mini cognitive assessment was administered scoring 3 normal range  It is recommended to continue to follow cognition  Please refer to the recommendation of the Occupational Therapist for safe discharge planning  Please refer to the recommendation of the Occupational Therapist for safe DC planning  Patient will benefit from skilled Occupational Therapy services to address above deficits and facilitate a safe return to prior level of function  "  Goals   Patient Goals \"go home\"   LTG Time Frame   (8-14)   Long Term Goal #1 Goals established to promote Patient Goals: \"go home\":  Eating: independent; Grooming: supervision standing at sink; Bathing: independent; Upper Body Dressing independent; Lower Body Dressing: independent; Toileting: independent; Patient will increase ambulatory standard toilet transfer to supervision with rolling walker to increase performance and safety with ADLS and functional mobility; Patient will increase standing tolerance to 5 minutes during ADL task to decrease assistance level and decrease fall risk; Patient will increase bed mobility to independent in preparation for ADLS and transfers; Patient will increase functional mobility to and from bathroom with rolling walker independently to increase performance with ADLS and to use a toilet; Patient will tolerate 10 minutes of UE ROM/strengthening to increase general activity tolerance and performance in ADLS/IADLS; Patient will improve functional activity tolerance to 10 minutes of sustained functional tasks to increase participation in basic self-care and decrease assistance level;  Patient will increase dynamic standing balance to fair to improve postural stability and decrease fall risk during standing ADLS and transfers  Plan   Treatment Interventions ADL retraining;Functional transfer training; Endurance training;Patient/family training;Neuromuscular reeducation; Compensatory technique education;Continued evaluation; Energy conservation; Activityengagement   Goal Expiration Date 04/08/23   OT Frequency 2-3x/wk   Recommendation   OT Discharge Recommendation Home with home health rehabilitation   AM-PAC Daily Activity Inpatient   Lower Body Dressing 3   Bathing 3   Toileting 3   Upper Body Dressing 3   Grooming 3   Eating 4   Daily Activity Raw Score 19   Daily Activity Standardized Score (Calc for Raw Score >=11) 40 22   AM-PAC Applied Cognition Inpatient   Following a " Speech/Presentation 4   Understanding Ordinary Conversation 4   Taking Medications 2   Remembering Where Things Are Placed or Put Away 2   Remembering List of 4-5 Errands 2   Taking Care of Complicated Tasks 2   Applied Cognition Raw Score 16   Applied Cognition Standardized Score 35 03   Mini-Cog Assessment   Word Version Version 1: Banana, Sunrise, Chair   Clock Draw (CDT) 2   Word Recall 1   Mini-Cog Score 3   Mini-Cog Results Negative screen for dementia   Barthel Index   Feeding 10   Bathing 0   Grooming Score 5   Dressing Score 10   Bladder Score 10   Bowels Score 10   Toilet Use Score 5   Transfers (Bed/Chair) Score 10   Mobility (Level Surface) Score 10   Stairs Score 0   Barthel Index Score 70   End of Consult   Patient Position at End of Consult Bed/Chair alarm activated;Supine; All needs within reach   Nurse Communication Nurse aware of consult

## 2023-03-25 NOTE — PLAN OF CARE
Problem: OCCUPATIONAL THERAPY ADULT  Goal: Performs self-care activities at highest level of function for planned discharge setting  See evaluation for individualized goals  Description: Treatment Interventions: ADL retraining, Functional transfer training, Endurance training, Patient/family training, Neuromuscular reeducation, Compensatory technique education, Continued evaluation, Energy conservation, Activityengagement          See flowsheet documentation for full assessment, interventions and recommendations  Note: Limitation: Decreased ADL status, Decreased Safe judgement during ADL, Decreased self-care trans, Decreased high-level ADLs, Decreased endurance  Prognosis: Fair  Assessment: Patient evaluated by Occupational Therapy  Patient admitted with Acute on chronic respiratory failure with hypoxemia ([facility] [address] )  The patients occupational profile, medical and therapy history includes a expanded review of medical and/or therapy records and additional review of physical, cognitive, or psychosocial history related to current functional performance  Comorbidities affecting functional mobility and ADLS include: cancer, COPD, diabetes and hypertension  Prior to admission, patient was independent with functional mobility without assistive device, independent with ADLS, requiring assist for IADLS and living with spouse in a 2 level home with 0 steps to enter  The evaluation identifies the following performance deficits: weakness, impaired balance, decreased endurance, decreased coordination, increased fall risk, new onset of impairment of functional mobility, decreased ADLS, decreased IADLS, decreased activity tolerance, decreased safety awareness and SOB upon exertion, that result in activity limitations and/or participation restrictions   This evaluation requires clinical decision making of moderate complexity, because the patient may present with comorbidities that affect occupational performance and required minimal or moderate modification of tasks or assistance with the consideration of several treatment options  The Barthel Index was used as a functional outcome tool presenting with a score of Barthel Index Score: 70,The patient's raw score on the -PAC Daily Activity Inpatient Short Form is 19  A raw score of greater than or equal to 19 suggests the patient may benefit from discharge to home with home health OT  Patient presented difficulty recalling home set up and orientation to time and place and situation  The mini cognitive assessment was administered scoring 3 normal range  It is recommended to continue to follow cognition  Please refer to the recommendation of the Occupational Therapist for safe discharge planning  Please refer to the recommendation of the Occupational Therapist for safe DC planning  Patient will benefit from skilled Occupational Therapy services to address above deficits and facilitate a safe return to prior level of function       OT Discharge Recommendation: Home with home health rehabilitation

## 2023-03-25 NOTE — ED ATTENDING ATTESTATION
3/24/2023  I, Author Juancarlos MD, saw and evaluated the patient  I have discussed the patient with the resident/non-physician practitioner and agree with the resident's/non-physician practitioner's findings, Plan of Care, and MDM as documented in the resident's/non-physician practitioner's note, except where noted  All available labs and Radiology studies were reviewed  I was present for key portions of any procedure(s) performed by the resident/non-physician practitioner and I was immediately available to provide assistance  At this point I agree with the current assessment done in the Emergency Department  I have conducted an independent evaluation of this patient a history and physical is as follows:Patient is a 67year old female with worsening R sided pleuritic chest pain with sob and cough since being discharged from the hospital yesterday  No fever  No N/V  Was last seen in this ED on 3/22/23 for right orbital fx and rib fractures on the right  [facility] website checked on this patient and last Rx filled was on 3/23/23 for dilaudid for 5 day supply  (+) multiple ecchymoses of face  Mucous membranes moist  Lungs clear  Heart regular without murmur  (+) R side lateral chest wall tenderness  Abdomen soft and nontender  Good bowel sounds  No edema  DDX including but not limited to: pneumonia, pleural effusion, CHF, PE, PTX, ACS, MI, asthma exacerbation, COPD exacerbation, COVID 19, rib fractures, anemia, metabolic abnormality, renal failure  Will check labs, EKG, CXR and will need CT scan and then notify trauma       ED Course         Critical Care Time  Procedures

## 2023-03-25 NOTE — ASSESSMENT & PLAN NOTE
- Acute hypoxemia on room air, requiring on 2L O2 NC in the setting of known right anterior 6th and 7th buckle rib fractures and chronic COPD  - Rib fracture protocol  - Aggressive pulmonary hygiene  - Likely due to atelectasis  - Acute PE ruled out with CTA chest  - Continue supplemental O2 as needed to maintain O2 > 88% due to history of COPD  - Continuous pulse oximetry

## 2023-03-25 NOTE — ASSESSMENT & PLAN NOTE
- Known right anterior 7th and 8th rib buckle fractures from mechanical fall 3/22/23  - Continue rib fracture protocol  - Continue to encourage incentive spirometer use and adequate pulmonary hygiene  Currently pulling 1250 mL on I S   - Continue multimodal analgesic regimen  - Appreciate APS evaluation and recommendations from prior admission: Dilaudid 1 mg/2 mg PO tablets for mod-severe pain because patient experiences nausea/ vomiting with oxycodone  - Supplemental oxygen via nasal cannula as needed to maintain saturations greater than or equal to 94%    - PT and OT evaluation and treatment as indicated

## 2023-03-25 NOTE — PLAN OF CARE
Problem: PHYSICAL THERAPY ADULT  Goal: Performs mobility at highest level of function for planned discharge setting  See evaluation for individualized goals  Description: Treatment/Interventions: Functional transfer training, LE strengthening/ROM, Therapeutic exercise, Endurance training, Gait training, Patient/family training, Equipment eval/education          See flowsheet documentation for full assessment, interventions and recommendations  Note:    Problem List: Decreased strength, Decreased endurance, Impaired balance, Decreased mobility, Pain  Assessment: Pt presents with shortness of breath  Reports she was in the hospital from 3/22/23-3/23 after a fall, was diagnosed with right sided rib fractures and facial fractures and she was feeling well, so she was discharged  Pt states over the past 24 hours, she has had worsening right sided chest wall pain, difficulty taking deep breaths, and severe pain with coughing  Dx: acute on chronic hypoxic respiratory failure, ARF on CKD, right 7th and 8th rib fxs, right orbital wall fx, COPD, and DM  order placed for PT eval and tx, w/ activity order of out of bed to chair  pt presents w/ comorbidities of COPD, CKD, chronic pain disorder, DM, neuropathy, hyperlipidemia, obesity, and arthritis and personal factors of mobilizing w/ assistive device, positive fall history, depression, inability to perform IADLs and readmission to the hospital  pt presents w/ pain, weakness, decreased endurance, impaired balance, gait deviations and fall risk  these impairments are evident in findings from physical examination (weakness), mobility assessment (need for standby assist w/ all phases of mobility when usually mobilizing independently, tolerance to only 90 feet of ambulation and need for cueing for mobility technique), and Barthel Index: 75/100 and Comfortable Gait Speed: 0 49 m/s (less than 1 0 m/s indicates need for intervention to address falls risk)   pt needed input for mobility technique  pt is at risk for falls due to physical and safety awareness deficits  pt's clinical presentation is unstable/unpredictable (evident in poor blood pressure control, need for assist w/ all phases of mobility when usually mobilizing independently, tolerance to only 90 feet of ambulation, pain impacting overall mobility status, declining oxygen saturation w/ low level of activity and need for input for mobility technique/safety)  pt needs inpatient PT tx to improve mobility deficits and progress mobility training as appropriate  discharge recommendation is for outpatient PT to reduce fall risk and maximize level of functional independence  PT Discharge Recommendation: Home with outpatient rehabilitation    See flowsheet documentation for full assessment

## 2023-03-25 NOTE — QUICK NOTE
Rounded on patient  Patient describes doing well this morning  She denies any current feelings of shortness of breath or difficulty breathing  She is on room air saturating 94%  No orthopnea  No tachypnea  Lung sounds are clear  She displays the ability to inspire 1 5 L on I-S  We discussed patient remaining in the hospital until tomorrow 3/26 for supplemental IVF to monitor ANDRA  Patient does have a nephrologist, Dr Bridger Erickson that she can follow-up with  She describes her baseline creatinine never going above 2 0  She confirms that she has follow-up scheduled for Monday 3/27 with [facility] for her right orbital floor fracture  Patient will be evaluated by PT/OT again today to ensure safe discharge home when medically stable  At the completion of our conversation she confirms she feels comfortable with plan of care and denies have any questions

## 2023-03-25 NOTE — ASSESSMENT & PLAN NOTE
Lab Results   Component Value Date    EGFR 20 03/24/2023    EGFR 25 03/22/2023    EGFR 28 02/23/2023    CREATININE 2 34 (H) 03/24/2023    CREATININE 1 93 (H) 03/22/2023    CREATININE 1 73 (H) 02/23/2023     - Baseline Cr 1 4-1 7  - Cr 2 34 on admission  - Continue home medications including diuretics   - Continue gentle IV fluids  - Monitor BMP closely  - SQH for DVT ppx

## 2023-03-26 PROBLEM — J30.2 SEASONAL ALLERGIES: Status: ACTIVE | Noted: 2023-03-26

## 2023-03-26 LAB
ANION GAP SERPL CALCULATED.3IONS-SCNC: 10 MMOL/L (ref 4–13)
BACTERIA UR QL AUTO: ABNORMAL /HPF
BASOPHILS # BLD AUTO: 0.04 THOUSANDS/ÂΜL (ref 0–0.1)
BASOPHILS NFR BLD AUTO: 0 % (ref 0–1)
BILIRUB UR QL STRIP: NEGATIVE
BUN SERPL-MCNC: 43 MG/DL (ref 5–25)
CALCIUM SERPL-MCNC: 8.5 MG/DL (ref 8.4–10.2)
CHLORIDE SERPL-SCNC: 97 MMOL/L (ref 96–108)
CLARITY UR: ABNORMAL
CO2 SERPL-SCNC: 30 MMOL/L (ref 21–32)
COLOR UR: ABNORMAL
CREAT SERPL-MCNC: 2.4 MG/DL (ref 0.6–1.3)
EOSINOPHIL # BLD AUTO: 0.26 THOUSAND/ÂΜL (ref 0–0.61)
EOSINOPHIL NFR BLD AUTO: 2 % (ref 0–6)
ERYTHROCYTE [DISTWIDTH] IN BLOOD BY AUTOMATED COUNT: 14.7 % (ref 11.6–15.1)
GFR SERPL CREATININE-BSD FRML MDRD: 19 ML/MIN/1.73SQ M
GLUCOSE SERPL-MCNC: 132 MG/DL (ref 65–140)
GLUCOSE SERPL-MCNC: 147 MG/DL (ref 65–140)
GLUCOSE SERPL-MCNC: 156 MG/DL (ref 65–140)
GLUCOSE SERPL-MCNC: 159 MG/DL (ref 65–140)
GLUCOSE SERPL-MCNC: 186 MG/DL (ref 65–140)
GLUCOSE SERPL-MCNC: 69 MG/DL (ref 65–140)
GLUCOSE UR STRIP-MCNC: NEGATIVE MG/DL
HCT VFR BLD AUTO: 32.2 % (ref 34.8–46.1)
HGB BLD-MCNC: 10.2 G/DL (ref 11.5–15.4)
HGB UR QL STRIP.AUTO: NEGATIVE
HYALINE CASTS #/AREA URNS LPF: ABNORMAL /LPF
IMM GRANULOCYTES # BLD AUTO: 0.11 THOUSAND/UL (ref 0–0.2)
IMM GRANULOCYTES NFR BLD AUTO: 1 % (ref 0–2)
KETONES UR STRIP-MCNC: NEGATIVE MG/DL
LEUKOCYTE ESTERASE UR QL STRIP: ABNORMAL
LYMPHOCYTES # BLD AUTO: 2.05 THOUSANDS/ÂΜL (ref 0.6–4.47)
LYMPHOCYTES NFR BLD AUTO: 12 % (ref 14–44)
MCH RBC QN AUTO: 29.5 PG (ref 26.8–34.3)
MCHC RBC AUTO-ENTMCNC: 31.7 G/DL (ref 31.4–37.4)
MCV RBC AUTO: 93 FL (ref 82–98)
MONOCYTES # BLD AUTO: 0.87 THOUSAND/ÂΜL (ref 0.17–1.22)
MONOCYTES NFR BLD AUTO: 5 % (ref 4–12)
MUCOUS THREADS UR QL AUTO: ABNORMAL
NEUTROPHILS # BLD AUTO: 14.34 THOUSANDS/ÂΜL (ref 1.85–7.62)
NEUTS SEG NFR BLD AUTO: 80 % (ref 43–75)
NITRITE UR QL STRIP: NEGATIVE
NON-SQ EPI CELLS URNS QL MICRO: ABNORMAL /HPF
NRBC BLD AUTO-RTO: 0 /100 WBCS
PH UR STRIP.AUTO: 5.5 [PH]
PLATELET # BLD AUTO: 211 THOUSANDS/UL (ref 149–390)
PMV BLD AUTO: 11.6 FL (ref 8.9–12.7)
POTASSIUM SERPL-SCNC: 4 MMOL/L (ref 3.5–5.3)
PROT UR STRIP-MCNC: ABNORMAL MG/DL
RBC # BLD AUTO: 3.46 MILLION/UL (ref 3.81–5.12)
RBC #/AREA URNS AUTO: ABNORMAL /HPF
SODIUM SERPL-SCNC: 137 MMOL/L (ref 135–147)
SP GR UR STRIP.AUTO: 1.02 (ref 1–1.03)
UROBILINOGEN UR STRIP-ACNC: <2 MG/DL
WBC # BLD AUTO: 17.67 THOUSAND/UL (ref 4.31–10.16)
WBC #/AREA URNS AUTO: ABNORMAL /HPF
WBC CLUMPS # UR AUTO: PRESENT /UL

## 2023-03-26 RX ORDER — GINSENG 100 MG
1 CAPSULE ORAL 2 TIMES DAILY
Status: DISCONTINUED | OUTPATIENT
Start: 2023-03-26 | End: 2023-03-27 | Stop reason: HOSPADM

## 2023-03-26 RX ORDER — GABAPENTIN 100 MG/1
100 CAPSULE ORAL
Status: DISCONTINUED | OUTPATIENT
Start: 2023-03-26 | End: 2023-03-27

## 2023-03-26 RX ORDER — LORATADINE 10 MG/1
10 TABLET ORAL DAILY
Status: DISCONTINUED | OUTPATIENT
Start: 2023-03-26 | End: 2023-03-27 | Stop reason: HOSPADM

## 2023-03-26 RX ADMIN — DULOXETINE HYDROCHLORIDE 60 MG: 60 CAPSULE, DELAYED RELEASE ORAL at 09:05

## 2023-03-26 RX ADMIN — ACETAMINOPHEN 975 MG: 325 TABLET ORAL at 22:18

## 2023-03-26 RX ADMIN — INSULIN LISPRO 1 UNITS: 100 INJECTION, SOLUTION INTRAVENOUS; SUBCUTANEOUS at 17:26

## 2023-03-26 RX ADMIN — LEVOTHYROXINE SODIUM 112 MCG: 112 TABLET ORAL at 06:40

## 2023-03-26 RX ADMIN — SENNOSIDES AND DOCUSATE SODIUM 2 TABLET: 8.6; 5 TABLET ORAL at 17:25

## 2023-03-26 RX ADMIN — HEPARIN SODIUM 5000 UNITS: 5000 INJECTION INTRAVENOUS; SUBCUTANEOUS at 13:35

## 2023-03-26 RX ADMIN — INSULIN LISPRO 13 UNITS: 100 INJECTION, SOLUTION INTRAVENOUS; SUBCUTANEOUS at 09:08

## 2023-03-26 RX ADMIN — GUAIFENESIN 600 MG: 600 TABLET ORAL at 22:23

## 2023-03-26 RX ADMIN — INSULIN LISPRO 13 UNITS: 100 INJECTION, SOLUTION INTRAVENOUS; SUBCUTANEOUS at 13:34

## 2023-03-26 RX ADMIN — SENNOSIDES AND DOCUSATE SODIUM 2 TABLET: 8.6; 5 TABLET ORAL at 09:05

## 2023-03-26 RX ADMIN — HEPARIN SODIUM 5000 UNITS: 5000 INJECTION INTRAVENOUS; SUBCUTANEOUS at 06:40

## 2023-03-26 RX ADMIN — LEVALBUTEROL HYDROCHLORIDE 0.63 MG: 0.63 SOLUTION RESPIRATORY (INHALATION) at 13:48

## 2023-03-26 RX ADMIN — PANTOPRAZOLE SODIUM 40 MG: 40 TABLET, DELAYED RELEASE ORAL at 06:40

## 2023-03-26 RX ADMIN — INSULIN LISPRO 1 UNITS: 100 INJECTION, SOLUTION INTRAVENOUS; SUBCUTANEOUS at 09:07

## 2023-03-26 RX ADMIN — LORATADINE 10 MG: 10 TABLET ORAL at 09:10

## 2023-03-26 RX ADMIN — HEPARIN SODIUM 5000 UNITS: 5000 INJECTION INTRAVENOUS; SUBCUTANEOUS at 22:18

## 2023-03-26 RX ADMIN — METOPROLOL TARTRATE 50 MG: 50 TABLET, FILM COATED ORAL at 22:19

## 2023-03-26 RX ADMIN — INSULIN LISPRO 1 UNITS: 100 INJECTION, SOLUTION INTRAVENOUS; SUBCUTANEOUS at 13:34

## 2023-03-26 RX ADMIN — POLYETHYLENE GLYCOL 3350 17 G: 17 POWDER, FOR SOLUTION ORAL at 09:06

## 2023-03-26 RX ADMIN — INSULIN LISPRO 25 UNITS: 100 INJECTION, SOLUTION INTRAVENOUS; SUBCUTANEOUS at 17:25

## 2023-03-26 RX ADMIN — LEVALBUTEROL HYDROCHLORIDE 0.63 MG: 0.63 SOLUTION RESPIRATORY (INHALATION) at 07:52

## 2023-03-26 RX ADMIN — AMLODIPINE BESYLATE 5 MG: 5 TABLET ORAL at 09:06

## 2023-03-26 RX ADMIN — METOPROLOL TARTRATE 50 MG: 50 TABLET, FILM COATED ORAL at 09:05

## 2023-03-26 RX ADMIN — ACETAMINOPHEN 975 MG: 325 TABLET ORAL at 06:39

## 2023-03-26 RX ADMIN — GUAIFENESIN 600 MG: 600 TABLET ORAL at 09:05

## 2023-03-26 RX ADMIN — IPRATROPIUM BROMIDE 0.5 MG: 0.5 SOLUTION RESPIRATORY (INHALATION) at 20:09

## 2023-03-26 RX ADMIN — SODIUM CHLORIDE, SODIUM GLUCONATE, SODIUM ACETATE, POTASSIUM CHLORIDE, MAGNESIUM CHLORIDE, SODIUM PHOSPHATE, DIBASIC, AND POTASSIUM PHOSPHATE 75 ML/HR: .53; .5; .37; .037; .03; .012; .00082 INJECTION, SOLUTION INTRAVENOUS at 09:17

## 2023-03-26 RX ADMIN — BACITRACIN 1 SMALL APPLICATION: 500 OINTMENT TOPICAL at 22:18

## 2023-03-26 RX ADMIN — IPRATROPIUM BROMIDE 0.5 MG: 0.5 SOLUTION RESPIRATORY (INHALATION) at 13:48

## 2023-03-26 RX ADMIN — GABAPENTIN 100 MG: 100 CAPSULE ORAL at 22:18

## 2023-03-26 RX ADMIN — ACETAMINOPHEN 975 MG: 325 TABLET ORAL at 17:37

## 2023-03-26 RX ADMIN — LIDOCAINE 5% 1 PATCH: 700 PATCH TOPICAL at 09:12

## 2023-03-26 RX ADMIN — GABAPENTIN 300 MG: 300 CAPSULE ORAL at 09:06

## 2023-03-26 RX ADMIN — LOSARTAN POTASSIUM 100 MG: 50 TABLET, FILM COATED ORAL at 09:06

## 2023-03-26 RX ADMIN — CEFTRIAXONE SODIUM 1000 MG: 10 INJECTION, POWDER, FOR SOLUTION INTRAVENOUS at 12:30

## 2023-03-26 RX ADMIN — IPRATROPIUM BROMIDE 0.5 MG: 0.5 SOLUTION RESPIRATORY (INHALATION) at 07:52

## 2023-03-26 RX ADMIN — LEVALBUTEROL HYDROCHLORIDE 0.63 MG: 0.63 SOLUTION RESPIRATORY (INHALATION) at 20:09

## 2023-03-26 RX ADMIN — PANTOPRAZOLE SODIUM 40 MG: 40 TABLET, DELAYED RELEASE ORAL at 17:25

## 2023-03-26 RX ADMIN — PRAMIPEXOLE DIHYDROCHLORIDE 0.25 MG: 0.25 TABLET ORAL at 22:18

## 2023-03-26 NOTE — ASSESSMENT & PLAN NOTE
· Patient complaining of dysuria in the setting of ANDRA  · Started on 3-day course of Rocephin  · Continue to monitor kidney functioning and urinary symptoms

## 2023-03-26 NOTE — ASSESSMENT & PLAN NOTE
Lab Results   Component Value Date    EGFR 19 03/26/2023    EGFR 23 03/25/2023    EGFR 20 03/24/2023    CREATININE 2 40 (H) 03/26/2023    CREATININE 2 06 (H) 03/25/2023    CREATININE 2 34 (H) 03/24/2023     - Baseline Cr 1 4-1 7  - Cr 2 34 on admission, worsened to 2 4 today  - UA appears to show UTI in the setting of symptomatic dysuria  - Hold diuretics today   Monitor I&O   - Monitor BMP closely  - SQH for DVT ppx  -We will consider consulting nephrology if patient continues to worsen

## 2023-03-26 NOTE — ASSESSMENT & PLAN NOTE
- Acute hypoxemia on room air, requiring on 2L O2 NC in the setting of known right anterior 6th and 7th buckle rib fractures and chronic COPD  - Rib fracture protocol  - Aggressive pulmonary hygiene  - Likely due to atelectasis  - Acute PE ruled out with CTA chest  - Continue supplemental O2 as needed to maintain O2 > 88% due to history of COPD--transiently on 2 L nasal cannula last night    Now tolerating room air   - Continuous pulse oximetry

## 2023-03-26 NOTE — PROGRESS NOTES
[facility]  Progress Note  Name: Milly Friedman I  MRN: [id number]  Unit/Bed#: S -01 I Date of Admission: 3/24/2023   Date of Service: 3/26/2023 I Hospital Day: 1    Assessment/Plan   Seasonal allergies  Assessment & Plan  · Started on Claritin that she takes seasonally    Acute renal failure superimposed on stage 4 chronic kidney disease [facility])  Assessment & Plan  Lab Results   Component Value Date    EGFR 19 03/26/2023    EGFR 23 03/25/2023    EGFR 20 03/24/2023    CREATININE 2 40 (H) 03/26/2023    CREATININE 2 06 (H) 03/25/2023    CREATININE 2 34 (H) 03/24/2023     - Baseline Cr 1 4-1 7  - Cr 2 34 on admission, worsened to 2 4 today  - UA appears to show UTI in the setting of symptomatic dysuria  - Hold diuretics today  Monitor I&O   - Monitor BMP closely  - SQH for DVT ppx  -We will consider consulting nephrology if patient continues to worsen    Multiple closed fractures of ribs of right side  Assessment & Plan  - Known right anterior 7th and 8th rib buckle fractures from mechanical fall 3/22/23  - Continue rib fracture protocol  - Continue to encourage incentive spirometer use and adequate pulmonary hygiene  Currently pulling 1250 mL on I S   - Continue multimodal analgesic regimen  - Appreciate APS evaluation and recommendations from prior admission: Dilaudid 1 mg/2 mg PO tablets for mod-severe pain because patient experiences nausea/ vomiting with oxycodone  - Supplemental oxygen via nasal cannula as needed to maintain saturations greater than or equal to 94%    - PT and OT evaluation and treatment as indicated          Fracture of orbital floor, blow-out, right, closed, with routine healing, subsequent encounter  Assessment & Plan  - Known facial fractures from mechanical fall  - OMFS follow up scheduled for Monday 3/27 outpatient  - Ophthalmology evaluated the patient previous admission--no intervention  - No new pain or trauma  - Sinus precautions  -Follow-up as "scheduled with [facility] [place] as an outpatient    Hypothyroid  Assessment & Plan  · Continue home levothyroxine    UTI (urinary tract infection)  Assessment & Plan  · Patient complaining of dysuria in the setting of ANDRA  · Started on 3-day course of Rocephin  · Continue to monitor kidney functioning and urinary symptoms    COPD (chronic obstructive pulmonary disease) (HCC)  Assessment & Plan  - Continue home albuterol  - Respiratory protocol and Airway clearance ordered  - Aggressive pulmonary hygiene    HTN (hypertension)  Assessment & Plan  · Continue home metoprolol  · Hold home losartan, torsemide, spironolactone in the setting of ANDRA  · Continue to monitor blood pressure    Type 2 diabetes mellitus with diabetic nephropathy, with long-term current use of insulin [facility])  Assessment & Plan  Lab Results   Component Value Date    HGBA1C 6 8 (H) 03/07/2023       Recent Labs     03/25/23  1446 03/25/23  1617 03/25/23  2125 03/26/23  0733   POCGLU 73 93 179* 159*       Blood Sugar Average: Last 72 hrs:    - Continue home insulin regimen:   - 13 units of mealtime insulin for breakfast and lunch, 25 units for dinner   - 50 units of Lantus QHS  - Sliding scale insulin added  -Continue to monitor  (P) 114 5    * Acute on chronic respiratory failure with hypoxemia (HCC)  Assessment & Plan  - Acute hypoxemia on room air, requiring on 2L O2 NC in the setting of known right anterior 6th and 7th buckle rib fractures and chronic COPD  - Rib fracture protocol  - Aggressive pulmonary hygiene  - Likely due to atelectasis  - Acute PE ruled out with CTA chest  - Continue supplemental O2 as needed to maintain O2 > 88% due to history of COPD--transiently on 2 L nasal cannula last night    Now tolerating room air   - Continuous pulse oximetry             Bowel Regimen: Senna S  VTE Prophylaxis:Sequential compression device (Venodyne)  and Heparin     Disposition: Pending placement and improvement of her ANDRA    Subjective   Chief Complaint: \"I has " "been said I was little confused last night\"    Subjective: Patient states that her  was concerned that she was a little bit confused last night  He states that this commonly happens when she gets a urinary tract infection  She has developed some urinary symptoms including difficulty urinating, painful urination, and bladder discomfort  She denies any hematuria, nausea, vomiting, diarrhea  She also notes a mild cough and wheezing, she associates it with allergies  She denies any feelings of shortness of breath or difficulty breathing  She states that her rib pain is much improved today  She was able to get sleep overnight  She denies any other complaints or concerns  Objective   Vitals:   Temp:  [98 °F (36 7 °C)-99 4 °F (37 4 °C)] 98 1 °F (36 7 °C)  HR:  [] 65  Resp:  [16-18] 18  BP: ()/(57-79) 92/57    I/O     None           Physical Exam:   GENERAL APPEARANCE: No acute distress  NEURO: GCS 15  Light touch sensation intact throughout  No lateralizing weakness  HEENT: Ecchymosis and periorbital region PERRLA  Neck supple  CV: Regular rate and rhythm   +2 radial and dorsalis pedis pulses, bilaterally  LUNGS: Clear to auscultation, bilaterally  Patient does display a cough with a small amount of brown/thick secretions  GI: Soft, nontender  : Pelvis stable  MSK: Moving all extremities  No deformities  SKIN: Warm, dry  Invasive Devices     Peripheral Intravenous Line  Duration           Peripheral IV 03/24/23 Dorsal (posterior); Right Forearm 1 day    Peripheral IV 03/24/23 Left Antecubital 1 day                 [facility] Score  PIC Pain Score: 3 (3/26/2023  9:20 AM)  PIC Incentive Spirometry Score: 3 (3/26/2023  8:00 AM)  PIC Cough Description: 3 (3/26/2023  8:00 AM)  PIC Total Score: 9 (3/26/2023  8:00 AM)       If the Total PIC Score </=5, did you consult APS and evaluate patient for further intervention?: yes      Pain:    Incentive Spirometry  Cough  3 = Controlled  4 = Above " goal volume 3 = Strong  2 = Moderate  3 = Goal to alert volume 2 = Weak  1 = Severe  2 = Below alert volume 1 = Absent     1 = Unable to perform IS         Lab Results:   Results: I have personally reviewed all pertinent laboratory/tests results, BMP/CMP:   Lab Results   Component Value Date    SODIUM 137 03/26/2023    K 4 0 03/26/2023    CL 97 03/26/2023    CO2 30 03/26/2023    BUN 43 (H) 03/26/2023    CREATININE 2 40 (H) 03/26/2023    CALCIUM 8 5 03/26/2023    EGFR 19 03/26/2023    and CBC:   Lab Results   Component Value Date    WBC 17 67 (H) 03/26/2023    HGB 10 2 (L) 03/26/2023    HCT 32 2 (L) 03/26/2023    MCV 93 03/26/2023     03/26/2023    MCH 29 5 03/26/2023    MCHC 31 7 03/26/2023    RDW 14 7 03/26/2023    MPV 11 6 03/26/2023    NRBC 0 03/26/2023     Imaging: I have personally reviewed pertinent reports  Other Studies: none     was at bedside during my assessment and confirm that he agrees with plan of care and denies have any questions or concerns at the completion of our conversation

## 2023-03-26 NOTE — ASSESSMENT & PLAN NOTE
· Continue home metoprolol  · Hold home losartan, torsemide, spironolactone in the setting of ANDRA  · Continue to monitor blood pressure

## 2023-03-26 NOTE — ASSESSMENT & PLAN NOTE
- Known facial fractures from mechanical fall  - OMFS follow up scheduled for Monday 3/27 outpatient  - Ophthalmology evaluated the patient previous admission--no intervention  - No new pain or trauma  - Sinus precautions  -Follow-up as scheduled with OMFS as an outpatient

## 2023-03-26 NOTE — ASSESSMENT & PLAN NOTE
· In the setting of UTI  · WBC count was found to be elevated from 11-17 today  · Patient remains hemodynamically stable  · Started on Rocephin for treatment of UTI  · Continue to monitor

## 2023-03-26 NOTE — RESPIRATORY THERAPY NOTE
"RT Protocol Note  Dayton Rodgers 67 y o  female MRN: [id number]  Unit/Bed#: S -01 Encounter: [id number]    Assessment    Principal Problem:    Acute on chronic respiratory failure with hypoxemia (HCC)  Active Problems:    Type 2 diabetes mellitus with diabetic nephropathy, with long-term current use of insulin (HCC)    COPD (chronic obstructive pulmonary disease) ([facility] )    Fracture of orbital floor, blow-out, right, closed, with routine healing, subsequent encounter    Multiple closed fractures of ribs of right side    Acute renal failure superimposed on stage 4 chronic kidney disease ([place] [address] )      Home Pulmonary Medications:  Inhalers PRN  Home Devices/Therapy:  (PRN MDI)    Past Medical History:   Diagnosis Date   • Allergic    • Allergic rhinitis    • Anesthesia     pt reports \"had to use double lumen endo tube for hiatal hernia repair/so surgeon could get to where he needed to work\"   • Arthritis    • Aspiration into airway    • Basal cell carcinoma 2007    left cheek    • BCC (basal cell carcinoma) 05/27/2021    Left Nasal tip   • Cancer (HCC)     squamous cell cancer on forhead   • Cancer (HCC)     basal cell on nose    • Chronic kidney disease 2000, 2018    Stones, kidney disease stage 4   • Chronic pain disorder     bilat feet and joint pain on occas   • Colon polyp    • Constipation    • COPD (chronic obstructive pulmonary disease) ([place] [address] )    • COVID-19 08/2021    recovered at home/did receive monoclonal infusion   • Dental bridge present    • Depression    • Diabetes mellitus ([place] [address] )     Type 2   • Diabetic neuropathy ([place] [address] )    • Disease of thyroid gland    • Family history of thyroid problem    • Fatty liver    • GERD (gastroesophageal reflux disease)    • Heart burn    • Hiatal hernia    • History of pneumonia    • Hyperlipidemia    • Hyperplasia, parathyroid ([place] [address] )    • Hypertension    • Kidney problem    • Kidney stone    • Memory loss Julu2 2020   • Motion sickness    • Motion sickness    • Neck pain    • " Obesity    • Obesity (BMI 30 0-34  9)    • Pollen allergies    • RLS (restless legs syndrome)    • SCC (squamous cell carcinoma) 2021    left mid forehead   • Seasonal allergies    • Sleep apnea     has inspire   • Squamous cell skin cancer     left cheek    • Swollen ankles    • 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, left     great toe fusion   • Urinary incontinence    • Urinary tract infection 2022   • Wears glasses      Social History     Socioeconomic History   • Marital status: /Civil Union     Spouse name: None   • Number of children: None   • Years of education: None   • Highest education level: None   Occupational History   • Occupation: RETIRED   Tobacco Use   • Smoking status: Former     Packs/day: 2 00     Years: 10 00     Pack years: 20 00     Types: Cigarettes     Start date: 1968     Quit date: 1976     Years since quittin 2   • Smokeless tobacco: Never   • Tobacco comments:     Smoked 2 pack a day   Vaping Use   • Vaping Use: Never used   Substance and Sexual Activity   • Alcohol use: Yes     Comment: 1 or 2 a year   • Drug use: No   • Sexual activity: Not Currently     Partners: Male     Birth control/protection: Abstinence, Post-menopausal, Female Sterilization     Comment: defer   Other Topics Concern   • None   Social History Narrative   • None     Social Determinants of Health     Financial Resource Strain: Not on file   Food Insecurity: No Food Insecurity   • Worried About Running Out of Food in the Last Year: Never true   • Ran Out of Food in the Last Year: Never true   Transportation Needs: No Transportation Needs   • Lack of Transportation (Medical): No   • Lack of Transportation (Non-Medical):  No   Physical Activity: Not on file   Stress: Not on file   Social Connections: Not on file   Intimate Partner Violence: Not on file   Housing Stability: Unknown   • Unable to Pay for Housing in the Last Year: No   • Number of Places Lived in the Last Year: Not on "file   • Unstable Housing in the Last Year: No       Subjective  Pt in no apparent distress, alert and oriented X 3  Objective    Physical Exam:   Assessment Type: During-treatment  General Appearance: Alert, Awake  Respiratory Pattern: Normal  Chest Assessment: Chest expansion symmetrical  Bilateral Breath Sounds: Clear  Cough: None    Vitals:  Blood pressure 142/74, pulse 74, temperature 98 4 °F (36 9 °C), resp  rate 18, height 5' 2\" (1 575 m), weight 75 8 kg (167 lb), SpO2 98 %  Imaging and other studies: I have personally reviewed pertinent reports  Plan  Pt has IS, which she was instructed to use Q hour  She is able to get volumes of 1250ml, she has been instructed in splinting which she can do  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

## 2023-03-26 NOTE — ASSESSMENT & PLAN NOTE
Lab Results   Component Value Date    HGBA1C 6 8 (H) 03/07/2023       Recent Labs     03/25/23  1446 03/25/23  1617 03/25/23  2125 03/26/23  0733   POCGLU 73 93 179* 159*       Blood Sugar Average: Last 72 hrs:    - Continue home insulin regimen:   - 13 units of mealtime insulin for breakfast and lunch, 25 units for dinner   - 50 units of Lantus QHS  - Sliding scale insulin added  -Continue to monitor  (P) 114 5

## 2023-03-27 ENCOUNTER — TELEPHONE (OUTPATIENT)
Dept: OTHER | Facility: OTHER | Age: 73
End: 2023-03-27

## 2023-03-27 VITALS
HEIGHT: 62 IN | HEART RATE: 76 BPM | SYSTOLIC BLOOD PRESSURE: 123 MMHG | RESPIRATION RATE: 18 BRPM | WEIGHT: 167 LBS | DIASTOLIC BLOOD PRESSURE: 66 MMHG | BODY MASS INDEX: 30.73 KG/M2 | OXYGEN SATURATION: 83 % | TEMPERATURE: 97.9 F

## 2023-03-27 LAB
ANION GAP SERPL CALCULATED.3IONS-SCNC: 8 MMOL/L (ref 4–13)
ATRIAL RATE: 77 BPM
BASOPHILS # BLD AUTO: 0.04 THOUSANDS/ÂΜL (ref 0–0.1)
BASOPHILS NFR BLD AUTO: 0 % (ref 0–1)
BUN SERPL-MCNC: 39 MG/DL (ref 5–25)
CALCIUM SERPL-MCNC: 8 MG/DL (ref 8.4–10.2)
CHLORIDE SERPL-SCNC: 97 MMOL/L (ref 96–108)
CO2 SERPL-SCNC: 30 MMOL/L (ref 21–32)
CREAT SERPL-MCNC: 1.99 MG/DL (ref 0.6–1.3)
EOSINOPHIL # BLD AUTO: 0.47 THOUSAND/ÂΜL (ref 0–0.61)
EOSINOPHIL NFR BLD AUTO: 4 % (ref 0–6)
ERYTHROCYTE [DISTWIDTH] IN BLOOD BY AUTOMATED COUNT: 14.5 % (ref 11.6–15.1)
GFR SERPL CREATININE-BSD FRML MDRD: 24 ML/MIN/1.73SQ M
GLUCOSE SERPL-MCNC: 156 MG/DL (ref 65–140)
GLUCOSE SERPL-MCNC: 171 MG/DL (ref 65–140)
GLUCOSE SERPL-MCNC: 94 MG/DL (ref 65–140)
GLUCOSE SERPL-MCNC: 96 MG/DL (ref 65–140)
HCT VFR BLD AUTO: 33.3 % (ref 34.8–46.1)
HGB BLD-MCNC: 10.8 G/DL (ref 11.5–15.4)
IMM GRANULOCYTES # BLD AUTO: 0.07 THOUSAND/UL (ref 0–0.2)
IMM GRANULOCYTES NFR BLD AUTO: 1 % (ref 0–2)
LYMPHOCYTES # BLD AUTO: 2.09 THOUSANDS/ÂΜL (ref 0.6–4.47)
LYMPHOCYTES NFR BLD AUTO: 17 % (ref 14–44)
MCH RBC QN AUTO: 29.8 PG (ref 26.8–34.3)
MCHC RBC AUTO-ENTMCNC: 32.4 G/DL (ref 31.4–37.4)
MCV RBC AUTO: 92 FL (ref 82–98)
MONOCYTES # BLD AUTO: 0.56 THOUSAND/ÂΜL (ref 0.17–1.22)
MONOCYTES NFR BLD AUTO: 5 % (ref 4–12)
NEUTROPHILS # BLD AUTO: 9.19 THOUSANDS/ÂΜL (ref 1.85–7.62)
NEUTS SEG NFR BLD AUTO: 73 % (ref 43–75)
NRBC BLD AUTO-RTO: 0 /100 WBCS
P AXIS: 53 DEGREES
PLATELET # BLD AUTO: 209 THOUSANDS/UL (ref 149–390)
PMV BLD AUTO: 11.4 FL (ref 8.9–12.7)
POTASSIUM SERPL-SCNC: 4.2 MMOL/L (ref 3.5–5.3)
PR INTERVAL: 188 MS
QRS AXIS: 6 DEGREES
QRSD INTERVAL: 84 MS
QT INTERVAL: 392 MS
QTC INTERVAL: 443 MS
RBC # BLD AUTO: 3.62 MILLION/UL (ref 3.81–5.12)
SODIUM SERPL-SCNC: 135 MMOL/L (ref 135–147)
T WAVE AXIS: 26 DEGREES
VENTRICULAR RATE: 77 BPM
WBC # BLD AUTO: 12.42 THOUSAND/UL (ref 4.31–10.16)

## 2023-03-27 RX ORDER — GABAPENTIN 300 MG/1
300 CAPSULE ORAL
Status: DISCONTINUED | OUTPATIENT
Start: 2023-03-27 | End: 2023-03-27 | Stop reason: HOSPADM

## 2023-03-27 RX ORDER — CEPHALEXIN 500 MG/1
500 CAPSULE ORAL EVERY 8 HOURS SCHEDULED
Qty: 3 CAPSULE | Refills: 0 | Status: SHIPPED | OUTPATIENT
Start: 2023-03-27 | End: 2023-03-28

## 2023-03-27 RX ADMIN — POLYETHYLENE GLYCOL 3350 17 G: 17 POWDER, FOR SOLUTION ORAL at 09:24

## 2023-03-27 RX ADMIN — LEVALBUTEROL HYDROCHLORIDE 0.63 MG: 0.63 SOLUTION RESPIRATORY (INHALATION) at 07:34

## 2023-03-27 RX ADMIN — LIDOCAINE 5% 1 PATCH: 700 PATCH TOPICAL at 09:25

## 2023-03-27 RX ADMIN — LEVOTHYROXINE SODIUM 112 MCG: 112 TABLET ORAL at 06:15

## 2023-03-27 RX ADMIN — HEPARIN SODIUM 5000 UNITS: 5000 INJECTION INTRAVENOUS; SUBCUTANEOUS at 06:15

## 2023-03-27 RX ADMIN — HYDROMORPHONE HYDROCHLORIDE 1 MG: 2 TABLET ORAL at 09:23

## 2023-03-27 RX ADMIN — DULOXETINE HYDROCHLORIDE 60 MG: 60 CAPSULE, DELAYED RELEASE ORAL at 09:23

## 2023-03-27 RX ADMIN — GUAIFENESIN 600 MG: 600 TABLET ORAL at 09:23

## 2023-03-27 RX ADMIN — CEFTRIAXONE SODIUM 1000 MG: 10 INJECTION, POWDER, FOR SOLUTION INTRAVENOUS at 09:34

## 2023-03-27 RX ADMIN — LEVALBUTEROL HYDROCHLORIDE 0.63 MG: 0.63 SOLUTION RESPIRATORY (INHALATION) at 14:10

## 2023-03-27 RX ADMIN — LORATADINE 10 MG: 10 TABLET ORAL at 09:23

## 2023-03-27 RX ADMIN — INSULIN LISPRO 1 UNITS: 100 INJECTION, SOLUTION INTRAVENOUS; SUBCUTANEOUS at 13:07

## 2023-03-27 RX ADMIN — IPRATROPIUM BROMIDE 0.5 MG: 0.5 SOLUTION RESPIRATORY (INHALATION) at 14:10

## 2023-03-27 RX ADMIN — INSULIN LISPRO 13 UNITS: 100 INJECTION, SOLUTION INTRAVENOUS; SUBCUTANEOUS at 09:27

## 2023-03-27 RX ADMIN — ACETAMINOPHEN 975 MG: 325 TABLET ORAL at 13:07

## 2023-03-27 RX ADMIN — BACITRACIN 1 SMALL APPLICATION: 500 OINTMENT TOPICAL at 09:24

## 2023-03-27 RX ADMIN — INSULIN LISPRO 13 UNITS: 100 INJECTION, SOLUTION INTRAVENOUS; SUBCUTANEOUS at 13:07

## 2023-03-27 RX ADMIN — ACETAMINOPHEN 975 MG: 325 TABLET ORAL at 06:15

## 2023-03-27 RX ADMIN — IPRATROPIUM BROMIDE 0.5 MG: 0.5 SOLUTION RESPIRATORY (INHALATION) at 07:34

## 2023-03-27 RX ADMIN — HEPARIN SODIUM 5000 UNITS: 5000 INJECTION INTRAVENOUS; SUBCUTANEOUS at 13:07

## 2023-03-27 RX ADMIN — PANTOPRAZOLE SODIUM 40 MG: 40 TABLET, DELAYED RELEASE ORAL at 06:15

## 2023-03-27 RX ADMIN — INSULIN LISPRO 1 UNITS: 100 INJECTION, SOLUTION INTRAVENOUS; SUBCUTANEOUS at 16:30

## 2023-03-27 NOTE — ASSESSMENT & PLAN NOTE
Lab Results   Component Value Date    HGBA1C 6 8 (H) 03/07/2023       Recent Labs     03/26/23  1609 03/26/23  2157 03/26/23  2249 03/27/23  0709   POCGLU 156* 69 132 96       Blood Sugar Average: Last 72 hrs:    - Continue home insulin regimen:   - 13 units of mealtime insulin for breakfast and lunch, 25 units for dinner   - 50 units of Lantus QHS  - Sliding scale insulin added  -Continue to monitor  (P) 847 [id number]

## 2023-03-27 NOTE — DISCHARGE INSTRUCTIONS
Traumatic Rib Fracture Discharge Instructions: Your rib fractures will take time to heal  Rib fractures typically take at least 6-8 weeks to heal and may take longer  Activity:  - Walking and normal light activities are encouraged  Normal daily activities including climbing steps are okay  - Avoid lifting greater than 10 pounds, any strenuous activities and/or exercise, and contact sports until cleared by the trauma service  - Continue using the incentive spirometer at least 10 times every hour while awake  Return to work:    - You may return to work once you are cleared by the trauma service  Medications:    - You should continue your current medication regimen after discharge unless otherwise instructed  Please refer to your discharge medication list for further details  - Please take the pain medications as directed  - You are encouraged to use non-narcotic pain medications first and whenever possible  Reserve the use of narcotic pain medication for moderate to severe pain not controlled by non-narcotic medications   - No driving while taking narcotic pain medications  - You may become constipated, especially if taking pain medications  You may take any over the counter stool softeners or laxatives as needed  Examples: Milk of Magnesia, Colace, Senna  Additional Instructions:  - If you have any questions or concerns after discharge please call the office   - Call office or return to ER if fever greater than 101, chills, worsening/uncontrollable pain, develop productive cough, increasing shortness of breath, and/or difficulty breathing

## 2023-03-27 NOTE — ASSESSMENT & PLAN NOTE
- Patient has a history of chronic COPD, she was requiring 2 L nasal cannula on admission  She has since been weaned off  She does have a history of sleep apnea and wears a CPAP machine at night, which she has not been able to wear due to facial fractures--she wears 2 L of oxygen at night to supplement    Overnight pulse oximetry pending for oxygen qualification   - Rib fracture protocol  - Aggressive pulmonary hygiene  - Likely due to atelectasis  - Acute PE ruled out with CTA chest  - Continue supplemental O2 as needed to maintain O2 > 88% due to history of COPD  - Continuous pulse oximetry

## 2023-03-27 NOTE — ASSESSMENT & PLAN NOTE
Lab Results   Component Value Date    EGFR 24 03/27/2023    EGFR 19 03/26/2023    EGFR 23 03/25/2023    CREATININE 1 99 (H) 03/27/2023    CREATININE 2 40 (H) 03/26/2023    CREATININE 2 06 (H) 03/25/2023     - Baseline Cr 1 4-1 7, currently being treated for UTI  - Cr 2 34 on admission and worsened to 2 4--she is improved today at 1 9  -We will continue to hold diuretics today and monitor I&O--no current signs of fluid overload  - SQH for DVT ppx  -Recommend following up with nephrology within 1 week of discharge   -Patient may restart home diuretics on discharge

## 2023-03-27 NOTE — PROGRESS NOTES
[facility]  Progress Note  Name: Donato Espinoza I  MRN: [id number]  Unit/Bed#: S -01 I Date of Admission: 3/24/2023   Date of Service: 3/27/2023 I Hospital Day: 2    Assessment/Plan   Seasonal allergies  Assessment & Plan  · Started on Claritin that she takes seasonally    Acute renal failure superimposed on stage 4 chronic kidney disease [facility])  Assessment & Plan  Lab Results   Component Value Date    EGFR 24 03/27/2023    EGFR 19 03/26/2023    EGFR 23 03/25/2023    CREATININE 1 99 (H) 03/27/2023    CREATININE 2 40 (H) 03/26/2023    CREATININE 2 06 (H) 03/25/2023     - Baseline Cr 1 4-1 7, currently being treated for UTI  - Cr 2 34 on admission and worsened to 2 4--she is improved today at 1 9  -We will continue to hold diuretics today and monitor I&O--no current signs of fluid overload  - SQH for DVT ppx  -Recommend following up with nephrology within 1 week of discharge   -Patient may restart home diuretics on discharge  Multiple closed fractures of ribs of right side  Assessment & Plan  - Known right anterior 7th and 8th rib buckle fractures from mechanical fall 3/22/23  - Continue rib fracture protocol  - Continue to encourage incentive spirometer use and adequate pulmonary hygiene  Currently pulling 1250 mL on I S   - Continue multimodal analgesic regimen  - Appreciate APS evaluation and recommendations from prior admission: Dilaudid 1 mg/2 mg PO tablets for mod-severe pain because patient experiences nausea/ vomiting with oxycodone  - Supplemental oxygen via nasal cannula as needed to maintain saturations greater than or equal to 94%  - PT and OT evaluation and treatment as indicated    -Follow-up with trauma in 2 weeks          Fracture of orbital floor, blow-out, right, closed, with routine healing, subsequent encounter  Assessment & Plan  - Known facial fractures from mechanical fall  - OMFS follow up scheduled for Monday 3/27 outpatient  - Ophthalmology evaluated the patient previous admission--no intervention  - No new pain or trauma  - Sinus precautions  -Follow-up as scheduled with OMFS as an outpatient    Hypothyroid  Assessment & Plan  · Continue home levothyroxine    UTI (urinary tract infection)  Assessment & Plan  · Patient complaining of dysuria in the setting of ANDRA and leukocytosis  · Started on 3-day course of Rocephin--symptoms decreasing  · Continue to monitor kidney functioning and urinary symptoms    COPD (chronic obstructive pulmonary disease) ([facility])  Assessment & Plan  - Continue home albuterol  - Respiratory protocol and Airway clearance ordered  - Aggressive pulmonary hygiene  -Geriatrics was consulted-we will discuss with geriatric team regarding PCP  Patient notes her primary care doctor currently being on leave and not having good follow-up  Patient noted geriatric team making recommended for new PCP  HTN (hypertension)  Assessment & Plan  · Continue home metoprolol  · Hold home losartan, torsemide, spironolactone in the setting of ANDRA  · Continue to monitor blood pressure    Leukocytosis  Assessment & Plan  · In the setting of UTI  · WBC count downtrending today  · Patient remains hemodynamically stable  · Continue on Rocephin for treatment of UTI  · Continue to monitor      Type 2 diabetes mellitus with diabetic nephropathy, with long-term current use of insulin [facility])  Assessment & Plan  Lab Results   Component Value Date    HGBA1C 6 8 (H) 03/07/2023       Recent Labs     03/26/23  1609 03/26/23  2157 03/26/23  2249 03/27/23  0709   POCGLU 156* 69 132 96       Blood Sugar Average: Last 72 hrs:    - Continue home insulin regimen:   - 13 units of mealtime insulin for breakfast and lunch, 25 units for dinner   - 50 units of Lantus QHS  - Sliding scale insulin added  -Continue to monitor  (P) 311 [id number]    * Acute on chronic respiratory failure with hypoxemia [facility])  Assessment & Plan  - Patient has a history of chronic COPD, she was "requiring 2 L nasal cannula on admission  She has since been weaned off  She does have a history of sleep apnea and wears a CPAP machine at night, which she has not been able to wear due to facial fractures--she wears 2 L of oxygen at night to supplement  Overnight pulse oximetry pending for oxygen qualification   - Rib fracture protocol  - Aggressive pulmonary hygiene  - Likely due to atelectasis  - Acute PE ruled out with CTA chest  - Continue supplemental O2 as needed to maintain O2 > 88% due to history of COPD  - Continuous pulse oximetry             Bowel Regimen: Senna S  VTE Prophylaxis:Sequential compression device (Venodyne)  and Heparin     Disposition: Possible discharge later today  Patient would like to be discharged home  Subjective   Chief Complaint: \"I feel better today\"    Subjective: Patient describes feeling better overall today  She states that her urinary symptoms have improved  She continues to have some right-sided rib pain  She denies any shortness of breath or difficulty breathing  She confirms that she has been tolerating her diet and getting sleep overnight  No other complaints offered  Objective   Vitals:   Temp:  [97 9 °F (36 6 °C)-98 8 °F (37 1 °C)] 98 1 °F (36 7 °C)  HR:  [60-71] 62  Resp:  [17-18] 18  BP: ()/(52-70) 101/52    I/O       03/25 0701  03/26 0700 03/26 0701  03/27 0700 03/27 0701  03/28 0700    Urine (mL/kg/hr)  1150 (0 6)     Total Output  1150     Net  -1150                   Physical Exam:   GENERAL APPEARANCE: No acute distress  NEURO: GCS 15  Light touch sensation intact throughout  HEENT: Periorbital ecchymosis and swelling  Ecchymosis involving to deep purple  PERRL  EOMI  Neck supple  CV: Regular rate and rhythm   +2 radial and dorsalis pedis pulses, bilaterally  LUNGS: Clear to auscultation, bilaterally  Patient inspiring 1 25 L on I-S  No orthopnea  No tachypnea  GI: Abdomen is soft nontender  : Pelvis is stable    MSK: Moving " all extremities  No extremity tenderness or joint effusions noted  SKIN: Warm, dry  Invasive Devices     Peripheral Intravenous Line  Duration           Peripheral IV 03/24/23 Dorsal (posterior); Right Forearm 2 days                 [facility] Score  PIC Pain Score: 2 (3/27/2023  9:23 AM)  PIC Incentive Spirometry Score: 3 (3/27/2023 12:00 AM)  PIC Cough Description: 3 (3/27/2023 12:00 AM)  PIC Total Score: 9 (3/27/2023 12:00 AM)       If the Total PIC Score </=5, did you consult APS and evaluate patient for further intervention?: yes      Pain:    Incentive Spirometry  Cough  3 = Controlled  4 = Above goal volume 3 = Strong  2 = Moderate  3 = Goal to alert volume 2 = Weak  1 = Severe  2 = Below alert volume 1 = Absent     1 = Unable to perform IS         Lab Results:   Results: I have personally reviewed all pertinent laboratory/tests results, BMP/CMP:   Lab Results   Component Value Date    SODIUM 135 03/27/2023    K 4 2 03/27/2023    CL 97 03/27/2023    CO2 30 03/27/2023    BUN 39 (H) 03/27/2023    CREATININE 1 99 (H) 03/27/2023    CALCIUM 8 0 (L) 03/27/2023    EGFR 24 03/27/2023    and CBC:   Lab Results   Component Value Date    WBC 12 42 (H) 03/27/2023    HGB 10 8 (L) 03/27/2023    HCT 33 3 (L) 03/27/2023    MCV 92 03/27/2023     03/27/2023    MCH 29 8 03/27/2023    MCHC 32 4 03/27/2023    RDW 14 5 03/27/2023    MPV 11 4 03/27/2023    NRBC 0 03/27/2023     Imaging: I have personally reviewed pertinent reports       Other Studies: none

## 2023-03-27 NOTE — CASE MANAGEMENT
Case Management Assessment & Discharge Planning Note    Patient name Chela Mi  Location S /S -01 MRN [id number]  : 1950 Date 3/27/2023       Current Admission Date: 3/24/2023  Current Admission Diagnosis:Acute on chronic respiratory failure with hypoxemia [facility])   Patient Active Problem List    Diagnosis Date Noted   • Seasonal allergies 2023   • Acute on chronic respiratory failure with hypoxemia ([place] [address] ) 2023   • Acute renal failure superimposed on stage 4 chronic kidney disease ([place] [address] ) 2023   • Fall 2023   • Fracture of orbital floor, blow-out, right, closed, with routine healing, subsequent encounter 2023   • Nasal bones, closed fracture 2023   • Facial abrasion, initial encounter 2023   • Multiple closed fractures of ribs of right side 2023   • Liver function abnormality 2023   • Acute metabolic encephalopathy    • 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 2023   • Headache 2023   • Hypothyroid 2023   • Urinary incontinence 2022   • S/P foot surgery 2022   • UTI (urinary tract infection) 2022   • Hypotension 2022   • Urinary retention 2022   • Abnormal thyroid blood test 2022   • Abdominal pain 2022   • Diabetic nephropathy associated with type 2 diabetes mellitus ([place] [address] ) 2022   • Other chest pain 2021   • COVID-19 virus infection 2021   • Chronic constipation 2021   • History of colon polyps 2021   • BMI 29 0-29 9,adult 2021   • Epigastric abdominal pain with severe diabetic gastroparesis, status post EGD/Botox injection, 2021   • COPD (chronic obstructive pulmonary disease) ([place] [address] ) 2021   • Anemia in stage 4 chronic kidney disease ([place] [address] ) 2021   • B12 neuropathy ([place] [address] ) 2021   • Depression, recurrent ([facility] ) 2021   • Chronic respiratory failure with hypoxia ([facility] ) 2021   • Gastroparesis diabeticorum ([facility] ) 03/08/2021   • Ambulatory dysfunction 10/22/2020   • Memory changes 10/22/2020   • History of kidney stones 09/29/2020   • Urinary urgency 09/29/2020   • HTN (hypertension) 09/23/2020   • Type 2 diabetes mellitus with diabetic chronic kidney disease ([place] [address] ) 07/14/2020   • Leukocytosis 07/14/2020   • Fibromyalgia 05/27/2020   • ANDRA (acute kidney injury) ([place] [address] ) 05/08/2020   • Dyslipidemia 05/08/2020   • Vitamin D deficiency 05/08/2020   • Anemia of chronic renal failure, stage 3 (moderate) ([place] [address] ) 05/08/2020   • History of repair of hiatal hernia 05/03/2020   • Acute respiratory failure with hypoxia ([place] [address] ) 05/02/2020   • Dyspnea    • Pulmonary hypertension ([place] [address] ) 07/31/2019   • Type 2 diabetes mellitus with diabetic nephropathy, with long-term current use of insulin ([place] [address] ) 06/27/2019   • Persistent proteinuria 06/25/2019   • BARBARA (obstructive sleep apnea) 04/09/2019   • RLS (restless legs syndrome) 04/09/2019   • Stage 4 chronic kidney disease ([place] [address] ) 09/19/2018   • Hypertensive chronic kidney disease with stage 1 through stage 4 chronic kidney disease, or unspecified chronic kidney disease 09/19/2018      LOS (days): 2  Geometric Mean LOS (GMLOS) (days):   Days to GMLOS:     OBJECTIVE:  PATIENT READMITTED TO HOSPITAL  Risk of Unplanned Readmission Score: 35 71         Current admission status: Inpatient       Preferred Pharmacy:   [facility]/pharmacy [address] [address]  Phone: [phone] Fax: [phone]    Primary Care Provider: Sandra Rocha MD    Primary Insurance: MEDICARE  Secondary Insurance: COMMERCIAL MISCELLANEOUS    ASSESSMENT:  Yenny Van Proxies     [facility] Representative - Spouse   Primary Phone: [phone] (Mobile)  Home Phone: [phone]                              Patient Information  Admitted from[de-identified] Home  Mental Status: Alert  During Assessment patient was accompanied by: Spouse  Assessment information provided by[de-identified] Spouse, Patient  Primary Caregiver: Self  Support Systems: Family members  [place] of Residence: [address] do you live in?: [place] entry access options   Select all that apply : No steps to enter home  Type of Current Residence: 2 story home  Upon entering residence, is there a bedroom on the main floor (no further steps)?: Yes  Upon entering residence, is there a bathroom on the main floor (no further steps)?: Yes  In the last 12 months, was there a time when you were not able to pay the mortgage or rent on time?: No  In the last 12 months, was there a time when you did not have a steady place to sleep or slept in a shelter (including now)?: No  Homeless/housing insecurity resource given?: N/A  Living Arrangements: Lives w/ Spouse/significant other, Lives w/ Son    Activities of Daily Living Prior to Admission  Functional Status: Independent  Completes ADLs independently?: Yes  Ambulates independently?: Yes  Does patient use assisted devices?: No  Does patient currently own DME?: Yes  What DME does the patient currently own?: Rollator, Other (Comment) (transport WC)  Does patient have a history of Outpatient Therapy (PT/OT)?: Yes  Does the patient have a history of Short-Term Rehab?: Yes  Does patient have a history of HH?: Yes  Does patient currently have [place] [address]?: No         Patient Information Continued  Within the past 12 months, you worried that your food would run out before you got the money to buy more : Never true  Within the past 12 months, the food you bought just didn't last and you didn't have money to get more : Never true  Food insecurity resource given?: N/A         Means of Transportation  Means of Transport to Appts[de-identified] Family transport  In the past 12 months, has lack of transportation kept you from medical appointments or from getting medications?: No  In the past 12 months, has lack of transportation kept you from meetings, work, or from getting things needed for daily living?: No  Was application for public transport provided?: N/A        DISCHARGE DETAILS:    Discharge planning discussed with[de-identified] patient and pt's spouse, Barbra Parents of Choice: Yes  Comments - Freedom of Choice: CM met with pt and  at bedside re: assessment and dcp  Pt lives with spouse and son in two story home, no TRISTIN, FFSU  Pt independent with ADLS but  able to assist pt if needed  Pt does not use any dme at baseline but has rollator and transport chair if needed  Pt also utilizes an inspired device at night  PT rcmd OPPT, OT rcmd HHOT  Pt relayed she currently attends OPPT with [facility] and will resume upon d/c to home  Pt's  provides transport to her appointments  Per respiratory, pt also rcmd for home O2 at night  Pt relayed preference for [facility] dme - home O2 ordered thru [facility] via parachute   to provide pt transport home when ready for d/c  CM remains available for any further CM d/c needs identified  CM contacted family/caregiver?: Yes  Were Treatment Team discharge recommendations reviewed with patient/caregiver?: Yes  Did patient/caregiver verbalize understanding of patient care needs?: Yes  Were patient/caregiver advised of the risks associated with not following Treatment Team discharge recommendations?: Yes    Contacts  Patient Contacts: Jazmin Zelaya (spouse)  Relationship to Patient[de-identified] Family  Contact Method: In Person  Reason/Outcome: Emergency Contact, Discharge Planning, Continuity of [address]         Is the patient interested in [facility] at discharge?: No    DME Referral Provided  Referral made for DME?: No    Other Referral/Resources/Interventions Provided:  Interventions: DME  Referral Comments: Home O2 ordered through [facility] via parachute for delivery to home      Would you like to participate in our [address] service program?  : No - Declined    Treatment Team Recommendation: Home  Discharge Destination Plan[de-identified] Home  Transport at Discharge : Family

## 2023-03-27 NOTE — TELEPHONE ENCOUNTER
Patient called today regarding Patient is in the Hospital due to a fall  Patient is being discharged tomorrow and wants to know if she should keep her Appointment for Wednesday  Please call patient back to discuss

## 2023-03-27 NOTE — ASSESSMENT & PLAN NOTE
- Known right anterior 7th and 8th rib buckle fractures from mechanical fall 3/22/23  - Continue rib fracture protocol  - Continue to encourage incentive spirometer use and adequate pulmonary hygiene  Currently pulling 1250 mL on I S   - Continue multimodal analgesic regimen  - Appreciate APS evaluation and recommendations from prior admission: Dilaudid 1 mg/2 mg PO tablets for mod-severe pain because patient experiences nausea/ vomiting with oxycodone  - Supplemental oxygen via nasal cannula as needed to maintain saturations greater than or equal to 94%  - PT and OT evaluation and treatment as indicated    -Follow-up with trauma in 2 weeks

## 2023-03-27 NOTE — ASSESSMENT & PLAN NOTE
· In the setting of UTI  · WBC count downtrending today  · Patient remains hemodynamically stable  · Continue on Rocephin for treatment of UTI  · Continue to monitor

## 2023-03-27 NOTE — ASSESSMENT & PLAN NOTE
- Continue home albuterol  - Respiratory protocol and Airway clearance ordered  - Aggressive pulmonary hygiene  -Geriatrics was consulted-we will discuss with geriatric team regarding PCP  Patient notes her primary care doctor currently being on leave and not having good follow-up  Patient noted geriatric team making recommended for new PCP

## 2023-03-27 NOTE — PLAN OF CARE
Problem: RESPIRATORY - ADULT  Goal: Achieves optimal ventilation and oxygenation  Description: INTERVENTIONS:  - Assess for changes in respiratory status  - Assess for changes in mentation and behavior  - Position to facilitate oxygenation and minimize respiratory effort  - Oxygen administered by appropriate delivery if ordered  - Initiate smoking cessation education as indicated  - Encourage broncho-pulmonary hygiene including cough, deep breathe, Incentive Spirometry  - Assess the need for suctioning and aspirate as needed  - Assess and instruct to report SOB or any respiratory difficulty  - Respiratory Therapy support as indicated  Outcome: Progressing     Problem: PAIN - ADULT  Goal: Verbalizes/displays adequate comfort level or baseline comfort level  Description: Interventions:  - Encourage patient to monitor pain and request assistance  - Assess pain using appropriate pain scale  - Administer analgesics based on type and severity of pain and evaluate response  - Implement non-pharmacological measures as appropriate and evaluate response  - Consider cultural and social influences on pain and pain management  - Notify physician/advanced practitioner if interventions unsuccessful or patient reports new pain  Outcome: Progressing     Problem: SAFETY ADULT  Goal: Maintain or return to baseline ADL function  Description: INTERVENTIONS:  -  Assess patient's ability to carry out ADLs; assess patient's baseline for ADL function and identify physical deficits which impact ability to perform ADLs (bathing, care of mouth/teeth, toileting, grooming, dressing, etc )  - Assess/evaluate cause of self-care deficits   - Assess range of motion  - Assess patient's mobility; develop plan if impaired  - Assess patient's need for assistive devices and provide as appropriate  - Encourage maximum independence but intervene and supervise when necessary  - Involve family in performance of ADLs  - Assess for home care needs following discharge   - Consider OT consult to assist with ADL evaluation and planning for discharge  - Provide patient education as appropriate  Outcome: Progressing  Goal: Maintains/Returns to pre admission functional level  Description: INTERVENTIONS:  - Perform BMAT or MOVE assessment daily    - Set and communicate daily mobility goal to care team and patient/family/caregiver     - Collaborate with rehabilitation services on mobility goals if consulted  - Out of bed for toileting  - Record patient progress and toleration of activity level   Outcome: Progressing

## 2023-03-27 NOTE — ASSESSMENT & PLAN NOTE
· Patient complaining of dysuria in the setting of ANDRA and leukocytosis  · Started on 3-day course of Rocephin--symptoms decreasing  · Continue to monitor kidney functioning and urinary symptoms

## 2023-03-28 ENCOUNTER — TELEPHONE (OUTPATIENT)
Dept: NEPHROLOGY | Facility: CLINIC | Age: 73
End: 2023-03-28

## 2023-03-28 ENCOUNTER — TELEPHONE (OUTPATIENT)
Dept: FAMILY MEDICINE CLINIC | Facility: CLINIC | Age: 73
End: 2023-03-28

## 2023-03-28 DIAGNOSIS — I12.9 HYPERTENSIVE CHRONIC KIDNEY DISEASE WITH STAGE 1 THROUGH STAGE 4 CHRONIC KIDNEY DISEASE, OR UNSPECIFIED CHRONIC KIDNEY DISEASE: Primary | ICD-10-CM

## 2023-03-28 DIAGNOSIS — D63.1 ANEMIA IN STAGE 4 CHRONIC KIDNEY DISEASE: ICD-10-CM

## 2023-03-28 DIAGNOSIS — N18.4 ANEMIA IN STAGE 4 CHRONIC KIDNEY DISEASE: ICD-10-CM

## 2023-03-28 DIAGNOSIS — N18.4 STAGE 4 CHRONIC KIDNEY DISEASE (HCC): ICD-10-CM

## 2023-03-28 NOTE — TELEPHONE ENCOUNTER
Pt called, discharged from hospital, would like to know when should she be getting repeat labs to check renal function  No med changes

## 2023-03-28 NOTE — TELEPHONE ENCOUNTER
Bita Stanley MD  [facility]; Nephrology [place] [facility] 57 minutes ago (10:13 AM)    1 week after discharge   I did place the orders these are nonfasting   Also have her take a week of blood pressure readings in 1 to 2 weeks and send those   Thank you

## 2023-03-29 LAB

## 2023-03-29 NOTE — PROGRESS NOTES
Assessment/Plan:     Problem List Items Addressed This Visit        Unprioritized    Nasal bones, closed fracture    Multiple closed fractures of ribs of right side    Fracture of orbital floor, blow-out, right, closed, with routine healing, subsequent encounter    B12 neuropathy (HCC)    Acute renal failure superimposed on stage 4 chronic kidney disease ([facility] )    Acute on chronic respiratory failure with hypoxemia ([facility] )   Other Visit Diagnoses     Hospital discharge follow-up    -  Primary    Acute on chronic blood loss anemia        Herpes zoster without complication        Relevant Medications    gabapentin (Neurontin) 300 mg capsule      reviewed hospitalization, imaging, labs from hospitalization  Reviewed current symptoms and medications with pt and   She is using 1/2 tablet of pain medication as needed (hydromorphone)  Reviewed neurontin - she is using this and does not feel it is contributing to fatigue   Continue to work on taking deep breaths, call if fevers or if pain worsens   Follow up with trauma as planned  ?if she needs ENT referral for continued sinus pain/pressure   She and her  report she was cleared per ophtho   To have BMP and other labs for nephro and will send blood pressure readings to Dr Ela Rodriguez   Has appointment with endo     Pt following with many specialists, some at [facility] and others at [facility]   She will be transferring her primary care - looking for someone to help coordinate all of her care for her  Return for pt transferring to Dr Ronal Evans   Subjective:   Viki Zamora is a 67 y o  female here today for a hospital follow-up    Patient Active Problem List   Diagnosis   • Stage 4 chronic kidney disease ([facility] )   • Hypertensive chronic kidney disease with stage 1 through stage 4 chronic kidney disease, or unspecified chronic kidney disease   • BARBARA (obstructive sleep apnea)   • RLS (restless legs syndrome)   • Persistent proteinuria   • Type 2 diabetes mellitus with diabetic nephropathy, with long-term current use of insulin (HCC)   • Pulmonary hypertension (HCC)   • Dyspnea   • Acute respiratory failure with hypoxia (HCC)   • History of repair of hiatal hernia   • ANDRA (acute kidney injury) ([facility] )   • Dyslipidemia   • Vitamin D deficiency   • Anemia of chronic renal failure, stage 3 (moderate) (HCC)   • Fibromyalgia   • Type 2 diabetes mellitus with diabetic chronic kidney disease (HCC)   • Leukocytosis   • History of kidney stones   • Urinary urgency   • HTN (hypertension)   • Ambulatory dysfunction   • Memory changes   • Gastroparesis diabeticorum (HCC)   • B12 neuropathy (HCC)   • Depression, recurrent (HCC)   • Chronic respiratory failure with hypoxia (HCC)   • Anemia in stage 4 chronic kidney disease (HCC)   • Epigastric abdominal pain with severe diabetic gastroparesis, status post EGD/Botox injection, 5/14   • COPD (chronic obstructive pulmonary disease) (HCC)   • BMI 29 0-29 9,adult   • Chronic constipation   • History of colon polyps   • COVID-19 virus infection   • Other chest pain   • Diabetic nephropathy associated with type 2 diabetes mellitus (HCC)   • Abdominal pain   • Abnormal thyroid blood test   • Hypotension   • Urinary retention   • UTI (urinary tract infection)   • S/P foot surgery   • Urinary incontinence   • Headache   • Hypothyroid   • 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   • Acute metabolic encephalopathy   • Liver function abnormality   • Fall   • Fracture of orbital floor, blow-out, right, closed, with routine healing, subsequent encounter   • Nasal bones, closed fracture   • Facial abrasion, initial encounter   • Multiple closed fractures of ribs of right side   • Acute on chronic respiratory failure with hypoxemia ([facility] )   • Acute renal failure superimposed on stage 4 chronic kidney disease (HCC)   • Seasonal allergies        Current medications:  Current Outpatient Medications   Medication Sig Dispense Refill   • acetaminophen (TYLENOL) 325 mg tablet Take 3 tablets (975 mg total) by mouth every 8 (eight) hours  0   • albuterol (Ventolin HFA) 90 mcg/act inhaler Inhale 2 puffs every 6 (six) hours as needed for wheezing 54 g 0   • amLODIPine (NORVASC) 5 mg tablet Take 1 tablet (5 mg total) by mouth daily 90 tablet 0   • b complex vitamins capsule Take 1 capsule by mouth daily     • B-D ULTRAFINE III SHORT PEN 31G X 8 MM MISC USE AS DIRECTED 4 TIMES PER DAY  3   • Calcium Citrate 1040 MG TABS Take 500 mg by mouth Three times a day     • DULoxetine (CYMBALTA) 30 mg delayed release capsule Take 60 mg by mouth daily     • gabapentin (Neurontin) 300 mg capsule Take 1 capsule (300 mg total) by mouth daily at bedtime 90 capsule 0   • insulin aspart (NovoLOG) 100 units/mL injection Inject under the skin 3 (three) times a day before meals 16 units, 16 units, 25 units  • insulin detemir (Levemir FlexTouch) 100 Units/mL injection pen Inject 50 Units under the skin every evening      • metoprolol tartrate (LOPRESSOR) 50 mg tablet TAKE 1 TABLET (50 MG TOTAL) BY MOUTH EVERY 12 (TWELVE) HOURS 180 tablet 3   • olmesartan (BENICAR) 40 mg tablet Take 1 tablet (40 mg total) by mouth daily at bedtime 90 tablet 3   • pantoprazole (PROTONIX) 40 mg tablet TAKE 1 TABLET BY MOUTH TWICE A  tablet 2   • pramipexole (MIRAPEX) 0 25 mg tablet Take 1 tablet (0 25 mg total) by mouth daily at bedtime 90 tablet 2   • Probiotic Product (PROBIOTIC PO) Take 1 capsule by mouth 2 (two) times a day     • sodium chloride (OCEAN) 0 65 % nasal spray 1 spray into each nostril every hour as needed for congestion 15 mL 0   • torsemide (DEMADEX) 20 mg tablet TAKE 1 TABLET BY MOUTH EVERY DAY 90 tablet 3   • Vitamin D, Ergocalciferol, 2000 units CAPS Take 2,000 Units by mouth daily        No current facility-administered medications for this visit  HPI:   Chief Complaint   Patient presents with   • Transition of Care Management     -- Above per clinical staff and reviewed   --    TCM Call Date and time call was made  3/24/2023 10:41 AM    Hospital care reviewed  Records reviewed    Patient was hospitialized at  [address] [place] [address]    Date of Admission  03/22/23    Date of discharge  03/23/23    Diagnosis  Right orbital fracture, closed, initial encounter [facility])    Disposition  Home    Were the patients medications reviewed and updated  Yes    Current Symptoms  None    Dizziness severity  Mild      TCM Call     Post hospital issues  None    Should patient be enrolled in anticoag monitoring? No    Scheduled for follow up? Yes    Patients specialists  Nephrologist    Referrals needed  Jina Goldberg MA     Did you obtain your prescribed medications  Yes    Do you need help managing your prescriptions or medications  No    Is transportation to your appointment needed  No    I have advised the patient to call PCP with any new or worsening symptoms  k cosme    Living Arrangements  Spouse or Significiant other    Support System  Partner; Family    The type of support provided  Physical; Emotional; Financial    Do you have social support  Yes, as much as I need    Are you recieving any outpatient services  No    Are you recieving home care services  No    Are you using any community resources  No    Current waiver services  No    Have you fallen in the last 12 months  No    Interperter language line needed  No          Hospital Course:    3/22/23 admission:  Summary of Hospital Course: Patient was placed on the trauma service s/p fall while walking her dog when she sustained an orbital fracture and nasal bone fractures as well as R 7th/8th rib fractures  She was seen by OMFS who recommends non-operative management  She is to follow up with them in the office on Monday, 3/27  She is to maintain sinus precautions  She was seen by Ophthalmology, Dr Cecy Yost, who dilated her eye on 3/23 and found a normal exam  She was up and ambulatory without difficulty   She was pulling 1250 mls on IS and her rib pain was "controlled  She was seen by APS and declined peripheral block or EDC  She is medically stable for discharge  She is to follow up with trauma in 2 weeks for her rib fractures and is to follow up with OMFS on Monday, 3/27  She is to follow up with her own eye doctor next week       Today she is feeling well  Her pain is controlled  She denies visual complaints, dizziness/lighthaededness  She has no new areas of pain on teritary exam     3/24/23 admission:  HPI: Vance Villegas H&P on 3/27/2023 \"Gabriella Fuentes is a 67 y  o  female who presents with shortness of breath  Patient reports she was in the hospital from 3/22-3/23 after a fall, was diagnosed with right sided rib fractures and facial fractures and she was feeling well, so she was discharged  She denies any new trauma, but reports that over the past 24 hours, she has had worsening right sided chest wall pain, difficulty taking deep breaths, and severe pain with coughing  She reports that she also feels more wheezy than normal  She reports that her facial pain is unchanged  \"      Summary of Hospital Course: This is 60-year-old female who presented for reevaluation of her rib fractures due to developing shortness of breath  Patient does have a history of COPD  In the ED she required 2 L NC to maintain oxygen saturations greater than 88%  She was admitted to the trauma service  During her hospitalization her supplemental oxygen was progressively weaned off  Pain was controlled with a multimodal pain regimen  Patient continued to require nighttime oxygen  She does have a history of COPD and sleep apnea, she is currently unable to wear her CPAP due to facial fractures associated with her original fall  She was evaluated for nighttime oxygen in place of her CPAP, which she qualified for  With assistance of 3 L she is able to maintain oxygen saturations greater than 88% (secondary to COPD greater than 88% is patient's goal)    Patient was found to have UTI/ANDRA " during her hospitalization  She will be discharged on oral form of ABX to complete UTI treatment  Kidney functioning was monitored closely and has been downtrending  She is to follow-up with her nephrologist, Dr Nadir Darnell within 1 week and have repeat labs drawn prior for reevaluation of kidney functioning  Patient was also evaluated by geriatrics while at [address] [place] [address] these conversations patient expressed difficulty with follow-up associated with her primary care provider being on leave  She was recommended to follow-up with Dr Iliana Parks  Patient is scheduled to follow-up with trauma in 2 weeks  She confirms she continues to have follow-up arranged for her facial fractures with OMFS  Prior to discharge I did have an extensive conversation with patient regarding hospitalization, diagnostic studies, discharge medications, return precautions, and required follow-up--she did not have any questions at the completion of her conversation and confirms she feels comfortable being discharged home to her previous environment with her       Significant Findings, Care, Treatment and Services Provided:   XR chest 2 views     Result Date: 3/25/2023  Impression: No acute cardiopulmonary disease  Moderate hiatal hernia  Workstation performed: [id number]     CTA ED chest PE Study     Result Date: 3/25/2023  Impression: No evidence of pulmonary artery embolus Unchanged right anterior 7th and ribs buckle fractures Workstation performed: [id number]       Upcoming appointment with GI, ortho, uro, trauma, [facility] rheum, [facility] endo, nephro (6/28/23), derm     nephro to do blood work in one week and she is to send a week of blood pressure reading to Dr Nadir Darnell     Blood sugars in the hospital variable    D/c Cr 1 99 (Feb 1 7-1 8)    WBC 12 42 (down) Hgb 10 8 (admission 14 1, Feb 13 4)   LFTs returned to normal      Last endo note said to stop gabapentin due to somnolence   Eye doctor said everything looked fine  Oral "surgeon did not think she would need surgery      PT for shoulders and neck     Key findings:  Prominently displaced fracture of the inferior wall of the right orbit without intraorbital muscular entrapment      Acute mildly displaced bilateral nasal bone fractures and increased leftward nasal septal deviation      Right maxillary sinus hemorrhage  Right anterior 7th and 8th rib buckle fractures  Old left 6th and 7th rib deformities  No acute fracture or destructive osseous lesion  Today:  Admitted 3/22-3/23/23 and then again 3/24/23 - 3/27/23     Pain mainly in rib area   Some in neck area also   Face is okay   1/2 pain pill today made her very tired  Dog does not like the oxygen   Constipation   1/2 tablet twice a day   Had a lot of blood from her nose      Jan 1 shingles had gabepentin  At first was not helping  Jamari Nova presents today for hospital follow-up visit  Patient was contacted within 2 business days  Medications were reconciled  Hospital discharge summary was reviewed  As part of this post-hospital visit, records from the hospital, including history and physical examination, discharge summary, all laboratory tests and radiographic studies was reviewed with this patient  The following portions of the patient's history were reviewed and updated as appropriate: allergies, current medications, past family history, past medical history, past social history, past surgical history and problem list     Objective:  Vitals:  /82   Pulse 74   Temp (!) 97 3 °F (36 3 °C)   Resp 14   Ht 5' 2\" (1 575 m)   Wt 78 5 kg (173 lb)   SpO2 96%   BMI 31 64 kg/m²    Wt Readings from Last 3 Encounters:   03/30/23 78 5 kg (173 lb)   03/24/23 75 8 kg (167 lb)   02/13/23 74 8 kg (165 lb)      BP Readings from Last 3 Encounters:   03/30/23 124/82   03/27/23 123/66   03/23/23 121/77        Review of Systems   She has no other concerns  No unexpected weight changes   + muscular/bony chest pain, + " shortness of breath as expected, but taking a deep breath, no palpitations  No GERD  No changes in bowels or bladder  Sleeping well  No mood changes  Feels she is doing okay  + pressure in upper teeth on right/ + pain in ribs + pain with deep inspiration     Physical Exam   Constitutional:  she appears well-developed and well-nourished  HENT: Head: Normocephalic  No abnormalities seen in mouth  Neck: Neck supple  Cardiovascular: Normal rate, regular rhythm and normal heart sounds  Pulmonary/Chest: Effort normal and breath sounds normal  No wheezes, rales, or rhonchi  Abdominal: Soft  Bowel sounds are normal  There is no tenderness  No hepatosplenomegaly  Musculoskeletal: she exhibits no edema  pain ith palpation over ribs,  Bridge of nose  Lymphadenopathy: she has no cervical adenopathy  Neurological: she is alert and oriented to person, place, and time  Skin: Skin is warm and dry   + eccymosis around right eye, right eye laceration well healed  Mildly tender  Psychiatric: she has a normal mood and affect   her behavior is normal  Thought content normal

## 2023-03-30 ENCOUNTER — OFFICE VISIT (OUTPATIENT)
Dept: FAMILY MEDICINE CLINIC | Facility: CLINIC | Age: 73
End: 2023-03-30

## 2023-03-30 VITALS
TEMPERATURE: 97.3 F | HEART RATE: 74 BPM | HEIGHT: 62 IN | SYSTOLIC BLOOD PRESSURE: 124 MMHG | WEIGHT: 173 LBS | DIASTOLIC BLOOD PRESSURE: 82 MMHG | RESPIRATION RATE: 14 BRPM | BODY MASS INDEX: 31.83 KG/M2 | OXYGEN SATURATION: 96 %

## 2023-03-30 DIAGNOSIS — E53.8 B12 NEUROPATHY (HCC): ICD-10-CM

## 2023-03-30 DIAGNOSIS — Z09 HOSPITAL DISCHARGE FOLLOW-UP: Primary | ICD-10-CM

## 2023-03-30 DIAGNOSIS — B02.9 HERPES ZOSTER WITHOUT COMPLICATION: ICD-10-CM

## 2023-03-30 DIAGNOSIS — S02.31XD: ICD-10-CM

## 2023-03-30 DIAGNOSIS — N17.9 ACUTE RENAL FAILURE SUPERIMPOSED ON STAGE 4 CHRONIC KIDNEY DISEASE, UNSPECIFIED ACUTE RENAL FAILURE TYPE (HCC): ICD-10-CM

## 2023-03-30 DIAGNOSIS — G63 B12 NEUROPATHY (HCC): ICD-10-CM

## 2023-03-30 DIAGNOSIS — N18.4 ACUTE RENAL FAILURE SUPERIMPOSED ON STAGE 4 CHRONIC KIDNEY DISEASE, UNSPECIFIED ACUTE RENAL FAILURE TYPE (HCC): ICD-10-CM

## 2023-03-30 DIAGNOSIS — S22.41XD CLOSED FRACTURE OF MULTIPLE RIBS OF RIGHT SIDE WITH ROUTINE HEALING, SUBSEQUENT ENCOUNTER: ICD-10-CM

## 2023-03-30 DIAGNOSIS — S02.2XXD CLOSED FRACTURE OF NASAL BONE WITH ROUTINE HEALING, SUBSEQUENT ENCOUNTER: ICD-10-CM

## 2023-03-30 DIAGNOSIS — D62 ACUTE ON CHRONIC BLOOD LOSS ANEMIA: ICD-10-CM

## 2023-03-30 DIAGNOSIS — J96.21 ACUTE ON CHRONIC RESPIRATORY FAILURE WITH HYPOXEMIA (HCC): ICD-10-CM

## 2023-03-30 LAB
BACTERIA BLD CULT: NORMAL
BACTERIA BLD CULT: NORMAL

## 2023-03-30 RX ORDER — GABAPENTIN 300 MG/1
300 CAPSULE ORAL
Qty: 90 CAPSULE | Refills: 0
Start: 2023-03-30

## 2023-03-30 NOTE — PATIENT INSTRUCTIONS
Continue miralax - you can increase to twice a day or you can add colace and senna until you are off the pain medication     [facility]:  [phone]

## 2023-04-04 ENCOUNTER — APPOINTMENT (OUTPATIENT)
Dept: LAB | Facility: AMBULARY SURGERY CENTER | Age: 73
End: 2023-04-04

## 2023-04-04 ENCOUNTER — OFFICE VISIT (OUTPATIENT)
Dept: GASTROENTEROLOGY | Facility: CLINIC | Age: 73
End: 2023-04-04

## 2023-04-04 VITALS
SYSTOLIC BLOOD PRESSURE: 104 MMHG | DIASTOLIC BLOOD PRESSURE: 64 MMHG | BODY MASS INDEX: 31.03 KG/M2 | WEIGHT: 168.6 LBS | HEIGHT: 62 IN | HEART RATE: 71 BPM

## 2023-04-04 DIAGNOSIS — R74.8 ELEVATED LIVER ENZYMES: ICD-10-CM

## 2023-04-04 DIAGNOSIS — E11.43 GASTROPARESIS DIABETICORUM (HCC): Primary | ICD-10-CM

## 2023-04-04 DIAGNOSIS — D63.1 ANEMIA IN STAGE 4 CHRONIC KIDNEY DISEASE (HCC): ICD-10-CM

## 2023-04-04 DIAGNOSIS — I10 PRIMARY HYPERTENSION: ICD-10-CM

## 2023-04-04 DIAGNOSIS — N18.4 STAGE 4 CHRONIC KIDNEY DISEASE (HCC): ICD-10-CM

## 2023-04-04 DIAGNOSIS — Z86.010 HISTORY OF COLON POLYPS: ICD-10-CM

## 2023-04-04 DIAGNOSIS — I12.9 HYPERTENSIVE CHRONIC KIDNEY DISEASE WITH STAGE 1 THROUGH STAGE 4 CHRONIC KIDNEY DISEASE, OR UNSPECIFIED CHRONIC KIDNEY DISEASE: ICD-10-CM

## 2023-04-04 DIAGNOSIS — K21.9 GASTROESOPHAGEAL REFLUX DISEASE WITHOUT ESOPHAGITIS: ICD-10-CM

## 2023-04-04 DIAGNOSIS — N18.4 ANEMIA IN STAGE 4 CHRONIC KIDNEY DISEASE (HCC): ICD-10-CM

## 2023-04-04 DIAGNOSIS — K31.84 GASTROPARESIS DIABETICORUM (HCC): Primary | ICD-10-CM

## 2023-04-04 LAB
ANION GAP SERPL CALCULATED.3IONS-SCNC: 6 MMOL/L (ref 4–13)
BUN SERPL-MCNC: 48 MG/DL (ref 5–25)
CALCIUM SERPL-MCNC: 10 MG/DL (ref 8.3–10.1)
CHLORIDE SERPL-SCNC: 100 MMOL/L (ref 96–108)
CO2 SERPL-SCNC: 31 MMOL/L (ref 21–32)
CREAT SERPL-MCNC: 2.42 MG/DL (ref 0.6–1.3)
ERYTHROCYTE [DISTWIDTH] IN BLOOD BY AUTOMATED COUNT: 14.5 % (ref 11.6–15.1)
FERRITIN SERPL-MCNC: 22 NG/ML (ref 8–388)
GFR SERPL CREATININE-BSD FRML MDRD: 19 ML/MIN/1.73SQ M
GLUCOSE P FAST SERPL-MCNC: 142 MG/DL (ref 65–99)
HCT VFR BLD AUTO: 37.4 % (ref 34.8–46.1)
HGB BLD-MCNC: 12.1 G/DL (ref 11.5–15.4)
MAGNESIUM SERPL-MCNC: 1.9 MG/DL (ref 1.6–2.6)
MCH RBC QN AUTO: 29.8 PG (ref 26.8–34.3)
MCHC RBC AUTO-ENTMCNC: 32.4 G/DL (ref 31.4–37.4)
MCV RBC AUTO: 92 FL (ref 82–98)
PLATELET # BLD AUTO: 326 THOUSANDS/UL (ref 149–390)
PMV BLD AUTO: 11.6 FL (ref 8.9–12.7)
POTASSIUM SERPL-SCNC: 4.1 MMOL/L (ref 3.5–5.3)
RBC # BLD AUTO: 4.06 MILLION/UL (ref 3.81–5.12)
SODIUM SERPL-SCNC: 137 MMOL/L (ref 135–147)
WBC # BLD AUTO: 12.88 THOUSAND/UL (ref 4.31–10.16)

## 2023-04-04 NOTE — PROGRESS NOTES
[facility]  Progress Note - Kali Hein 67 y o  female MRN: [id number]    Unit/Bed#:  Encounter: [id number]    Assessment/Plan:    1  Elevated liver enzymes-hepatocellular pattern-noted to have AST of 77, ALT of 207 in January 2023 however this has now normalized  Right upper quadrant ultrasound does not show any evidence of fatty liver disease or hepatomegaly  -May resume statin at a low dose if needed while closely monitoring the liver enzymes   -Discussed with patient and  regarding avoiding all other hepatotoxic medications including herbal supplements   -No significant alcohol use reported   -Limit the use of NSAIDs and limit the use of acetaminophen to no more than 2 g daily  2   Gastroparesis-status post Botox injection in May 2022  Symptoms well controlled with small frequent meals, will hold off on repeat EGD with Botox injection at this time  3   GERD-did have a small hiatal hernia, symptoms of GERD are well controlled with pantoprazole 40 mg twice daily  4   Colon cancer [id number]-cr-date, repeat colonoscopy in 2024 for history of colon polyps including tubular adenomas and sessile serrated adenoma  Subjective: This is a 39-year-old female with history of CKD, COPD, diabetes, gastroparesis status post Botox injection in May 2022 who presents for follow up  Patient reports that she is feeling well  She recently had a fall and had a Rib fracture  She reports that she was having symptoms of nausea and acid reflux a several months ago however this has subsided  Patient reports that she has been eating small meals and has been taking acid reflux medications which have been working  She reports that she has been taking pantoprazole 40 mg twice daily with adequate control of acid reflux, no dysphagia or odynophagia, no nausea or vomiting      She denies any herbal supplements, reports rare use of alcohol, reports that she was diagnosed with fatty "liver disease many years ago  Colonoscopy was done in October 2021 and was recommended to repeat at 3-year interval secondary to findings of polyps  She was noted to have 7 colon polyps, mild left-sided diverticulosis and small internal hemorrhoids  Several of these polyps were tubular adenomas, sessile serrated adenoma and was recommended repeat colonoscopy 3-year interval     Most recently she underwent a right upper quad ultrasound by Dr Kay Cobos for elevated liver enzymes which showed normal-appearing liver, she is status post cholecystectomy with CBD measuring 4 mm without any evidence of choledocholithiasis or intrahepatic biliary ductal dilatation  Her liver enzymes were previously noted to be elevated however have normalized on the most recent blood work  Objective:     Vitals: Blood pressure 104/64, pulse 71, height 5' 2\" (1 575 m), weight 76 5 kg (168 lb 9 6 oz)  ,Body mass index is 30 84 kg/m²  [unfilled]    Physical Exam:    GEN: wn/wd, NAD  HEENT: MMM, recent bruising on the face secondary to fall  CV: RRR, no m/r/g  CHEST: CTA b/l, no w/r/r  ABD: +BS, soft, NT/ND, bandage on the right upper abdomen secondary to recent fracture  EXT: no c/c/e  SKIN: no rashes  NEURO: aaox3      Invasive Devices     None                         Lab, Imaging and other studies:     No visits with results within 1 Day(s) from this visit  Latest known visit with results is:   No results displayed because visit has over 200 results  I have personally reviewed pertinent films in PACS    No current facility-administered medications for this visit               Answers for HPI/ROS submitted by the patient on 3/30/2023  Chronicity: recurrent  Onset: more than 1 year ago  Onset quality: gradual  Frequency: daily  Progression since onset: unchanged  Pain location: LUQ  Pain - numeric: 2/10  Pain quality: burning  Radiates to: does not radiate  anorexia: No  arthralgias: No  belching: Yes  constipation: " Yes  diarrhea: No  dysuria: No  fever: No  flatus: Yes  frequency: No  headaches: No  hematochezia: No  hematuria: No  melena: No  myalgias: No  nausea: Yes  weight loss: No  vomiting: No  Aggravated by: eating  Relieved by: passing flatus

## 2023-04-05 ENCOUNTER — TELEPHONE (OUTPATIENT)
Dept: NEPHROLOGY | Facility: CLINIC | Age: 73
End: 2023-04-05

## 2023-04-05 ENCOUNTER — OFFICE VISIT (OUTPATIENT)
Dept: UROLOGY | Facility: CLINIC | Age: 73
End: 2023-04-05

## 2023-04-05 VITALS
DIASTOLIC BLOOD PRESSURE: 82 MMHG | OXYGEN SATURATION: 98 % | SYSTOLIC BLOOD PRESSURE: 124 MMHG | WEIGHT: 168 LBS | HEIGHT: 62 IN | RESPIRATION RATE: 18 BRPM | BODY MASS INDEX: 30.91 KG/M2 | HEART RATE: 79 BPM

## 2023-04-05 DIAGNOSIS — N39.0 RECURRENT UTI: Primary | ICD-10-CM

## 2023-04-05 LAB
BACTERIA UR QL AUTO: ABNORMAL /HPF
BILIRUB UR QL STRIP: NEGATIVE
CLARITY UR: CLEAR
COLOR UR: COLORLESS
GLUCOSE UR STRIP-MCNC: NEGATIVE MG/DL
HGB UR QL STRIP.AUTO: NEGATIVE
KETONES UR STRIP-MCNC: NEGATIVE MG/DL
LEUKOCYTE ESTERASE UR QL STRIP: NEGATIVE
NITRITE UR QL STRIP: NEGATIVE
NON-SQ EPI CELLS URNS QL MICRO: ABNORMAL /HPF
PH UR STRIP.AUTO: 6.5 [PH]
POST-VOID RESIDUAL VOLUME, ML POC: 324 ML
PROT UR STRIP-MCNC: ABNORMAL MG/DL
RBC #/AREA URNS AUTO: ABNORMAL /HPF
SP GR UR STRIP.AUTO: 1.01 (ref 1–1.03)
UROBILINOGEN UR STRIP-ACNC: <2 MG/DL
WBC #/AREA URNS AUTO: ABNORMAL /HPF

## 2023-04-05 NOTE — PROGRESS NOTES
1  Recurrent UTI  POCT Measure PVR    US kidney and bladder with pvr    Urinalysis with microscopic    Urine culture    CANCELED: POCT urine dip          Assessment and plan:       1  Recurrent UTI  - proper hydration, avoidance of constipation, probiotic, tight glycemic control  - start estrace 3x weekly  - will call PRN for infections  - if ongoing infections despite CIC, then possible trimethoprim prophylaxis    2  Nephrolithiasis   - patient's stones are intraparenchymal & stable    3  ANDRA  4  Incomplete bladder emptying  - US kidney and bladder in the next 1-2 weeks, return for CIC teaching with RN  Will plan for once daily CIC  RTC in 4 weeks for reassessment       [facility] [address]      Chief Complaint     Recurrent UTIs    History of Present Illness     Sadia Deleon is a 67 y o  female presenting today for follow up of recurrent urinary infections  Symptoms typically consist of dysuria  Symptoms do resolve with antibiotics however typically returns a few weeks later  Urine cultures:  + Enterobacter cloacae 3/30/22  + Enterobacter cloacae 4/13/22  + Enterobacter cloacae 6/30/22  + Enterobacter cloacae 8/11/22    CT 3/30/22 showing nonosbtructing intrarenal stones, largest 5mm, + hemorrhagic cysts  Stones appear to be parenchymal  Patient previously followed with Dr Zofia Lucio at [facility], last seen 2020  Left upper pole stones were stable dating to then  Had previously attempted ureteroscopy for said stones however they were intraparenchymal     Previous urge incontinence that had briefly been on oxybutynin for  Previously d/c'ed due to dry mouth  Patient started on estrace 3x weekly a few months ago  Unfortunately continually having recurrent infections  More recently hospitalized from a fall  Medical comorbidities include GERD, gastroparesis, constipation, type 2 diabetes, sleep apnea, COPD, pulmonary hypertension, chronic kidney disease, fibromyalgia, depression       Bladder scan 324mL    Laboratory     Lab Results   Component Value Date    CREATININE 2 42 (H) 04/04/2023       Review of Systems     Review of Systems   Constitutional: Negative for activity change, appetite change, chills, diaphoresis, fatigue, fever and unexpected weight change  Respiratory: Negative for chest tightness and shortness of breath  Cardiovascular: Negative for chest pain, palpitations and leg swelling  Gastrointestinal: Negative for abdominal distention, abdominal pain, constipation, diarrhea, nausea and vomiting  Genitourinary: Negative for decreased urine volume, difficulty urinating, dysuria, enuresis, flank pain, frequency, genital sores, hematuria and urgency  Musculoskeletal: Negative for back pain, gait problem and myalgias  Skin: Negative for color change, pallor, rash and wound  Psychiatric/Behavioral: Negative for behavioral problems  The patient is not nervous/anxious  Allergies     Allergies   Allergen Reactions   • Sulfamethoxazole-Trimethoprim Other (See Comments)     Tongue swelling   • Ciprofloxacin Hives and Itching       Physical Exam     Physical Exam  Constitutional:       General: She is not in acute distress  Appearance: Normal appearance  She is normal weight  She is not ill-appearing, toxic-appearing or diaphoretic  HENT:      Head: Normocephalic and atraumatic  Eyes:      General:         Right eye: No discharge  Left eye: No discharge  Conjunctiva/sclera: Conjunctivae normal    Pulmonary:      Effort: Pulmonary effort is normal  No respiratory distress  Musculoskeletal:         General: No swelling or tenderness  Skin:     General: Skin is warm and dry  Coloration: Skin is not jaundiced or pale  Neurological:      General: No focal deficit present  Mental Status: She is alert and oriented to person, place, and time     Psychiatric:         Mood and Affect: Mood normal          Behavior: Behavior normal          Thought Content: "Thought content normal        Vital Signs     Vitals:    04/05/23 1247   BP: 124/82   BP Location: Right arm   Patient Position: Sitting   Cuff Size: Standard   Pulse: 79   Resp: 18   SpO2: 98%   Weight: 76 2 kg (168 lb)   Height: 5' 2\" (1 575 m)     Current Medications       Current Outpatient Medications:   •  acetaminophen (TYLENOL) 325 mg tablet, Take 3 tablets (975 mg total) by mouth every 8 (eight) hours, Disp: , Rfl: 0  •  albuterol (Ventolin HFA) 90 mcg/act inhaler, Inhale 2 puffs every 6 (six) hours as needed for wheezing, Disp: 54 g, Rfl: 0  •  amLODIPine (NORVASC) 5 mg tablet, Take 1 tablet (5 mg total) by mouth daily, Disp: 90 tablet, Rfl: 0  •  b complex vitamins capsule, Take 1 capsule by mouth daily, Disp: , Rfl:   •  Calcium Citrate 1040 MG TABS, Take 500 mg by mouth Three times a day, Disp: , Rfl:   •  DULoxetine (CYMBALTA) 30 mg delayed release capsule, Take 60 mg by mouth daily, Disp: , Rfl:   •  gabapentin (Neurontin) 300 mg capsule, Take 1 capsule (300 mg total) by mouth daily at bedtime, Disp: 90 capsule, Rfl: 0  •  insulin aspart (NovoLOG) 100 units/mL injection, Inject under the skin 3 (three) times a day before meals 16 units, 16 units, 25 units  , Disp: , Rfl:   •  insulin detemir (Levemir FlexTouch) 100 Units/mL injection pen, Inject 50 Units under the skin every evening , Disp: , Rfl:   •  metoprolol tartrate (LOPRESSOR) 50 mg tablet, TAKE 1 TABLET (50 MG TOTAL) BY MOUTH EVERY 12 (TWELVE) HOURS, Disp: 180 tablet, Rfl: 3  •  olmesartan (BENICAR) 40 mg tablet, Take 1 tablet (40 mg total) by mouth daily at bedtime, Disp: 90 tablet, Rfl: 3  •  pantoprazole (PROTONIX) 40 mg tablet, TAKE 1 TABLET BY MOUTH TWICE A DAY, Disp: 180 tablet, Rfl: 2  •  pramipexole (MIRAPEX) 0 25 mg tablet, Take 1 tablet (0 25 mg total) by mouth daily at bedtime, Disp: 90 tablet, Rfl: 2  •  Probiotic Product (PROBIOTIC PO), Take 1 capsule by mouth 2 (two) times a day, Disp: , Rfl:   •  sodium chloride (OCEAN) 0 65 % " nasal spray, 1 spray into each nostril every hour as needed for congestion, Disp: 15 mL, Rfl: 0  •  torsemide (DEMADEX) 20 mg tablet, TAKE 1 TABLET BY MOUTH EVERY DAY, Disp: 90 tablet, Rfl: 3  •  Vitamin D, Ergocalciferol, 2000 units CAPS, Take 2,000 Units by mouth daily , Disp: , Rfl:   •  B-D ULTRAFINE III SHORT PEN 31G X 8 MM MISC, USE AS DIRECTED 4 TIMES PER DAY (Patient not taking: Reported on 4/4/2023), Disp: , Rfl: 3      Active Problems     Patient Active Problem List   Diagnosis   • Stage 4 chronic kidney disease ([facility] )   • Hypertensive chronic kidney disease with stage 1 through stage 4 chronic kidney disease, or unspecified chronic kidney disease   • BARBARA (obstructive sleep apnea)   • RLS (restless legs syndrome)   • Persistent proteinuria   • Type 2 diabetes mellitus with diabetic nephropathy, with long-term current use of insulin ([facility])   • Pulmonary hypertension ([facility])   • Dyspnea   • Acute respiratory failure with hypoxia ([facility])   • History of repair of hiatal hernia   • ANDRA (acute kidney injury) ([facility] )   • Dyslipidemia   • Vitamin D deficiency   • Anemia of chronic renal failure, stage 3 (moderate) ([facility])   • Fibromyalgia   • Type 2 diabetes mellitus with diabetic chronic kidney disease ([facility])   • Leukocytosis   • History of kidney stones   • Urinary urgency   • HTN (hypertension)   • Ambulatory dysfunction   • Memory changes   • Gastroparesis diabeticorum (HCC)   • B12 neuropathy (HCC)   • Depression, recurrent ([facility])   • Chronic respiratory failure with hypoxia ([facility])   • Anemia in stage 4 chronic kidney disease ([facility])   • Epigastric abdominal pain with severe diabetic gastroparesis, status post EGD/Botox injection, 5/14   • COPD (chronic obstructive pulmonary disease) ([facility])   • BMI 29 0-29 9,adult   • Chronic constipation   • History of colon polyps   • COVID-19 virus infection   • Other chest pain   • Diabetic nephropathy associated with type 2 diabetes mellitus (HCC)   • Abdominal pain   • Abnormal thyroid "blood test   • Hypotension   • Urinary retention   • UTI (urinary tract infection)   • S/P foot surgery   • Urinary incontinence   • Headache   • Hypothyroid   • 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   • Acute metabolic encephalopathy   • Liver function abnormality   • Fall   • Fracture of orbital floor, blow-out, right, closed, with routine healing, subsequent encounter   • Nasal bones, closed fracture   • Facial abrasion, initial encounter   • Multiple closed fractures of ribs of right side   • Acute on chronic respiratory failure with hypoxemia (HCC)   • Acute renal failure superimposed on stage 4 chronic kidney disease (HCC)   • Seasonal allergies   • Elevated liver enzymes         Past Medical History     Past Medical History:   Diagnosis Date   • Allergic    • Allergic rhinitis    • Anesthesia     pt reports \"had to use double lumen endo tube for hiatal hernia repair/so surgeon could get to where he needed to work\"   • Arthritis    • Aspiration into airway    • Ahumada esophagus 1993    Lot of stress with children   • Basal cell carcinoma 2007    left cheek    • BCC (basal cell carcinoma) 05/27/2021    Left Nasal tip   • Cancer (HCC)     squamous cell cancer on forhead   • Cancer (HCC)     basal cell on nose    • Cholelithiasis    • Chronic kidney disease 2000, 2018    Stones, kidney disease stage 4   • Chronic pain disorder     bilat feet and joint pain on occas   • Colon polyp    • Constipation    • COPD (chronic obstructive pulmonary disease) ([facility] [address] )    • COVID-19 08/2021    recovered at home/did receive monoclonal infusion   • Dental bridge present    • Depression    • Diabetes mellitus ([facility] [address] )     Type 2   • Diabetic neuropathy (HCC)    • Disease of thyroid gland    • Family history of thyroid problem    • Fatty liver    • GERD (gastroesophageal reflux disease)    • Heart burn    • Hiatal hernia    • History of pneumonia    • Hyperlipidemia    • Hyperplasia, parathyroid ([facility] [address] )    • Hypertension  " • Kidney problem    • Kidney stone    • Memory loss Julu2 2020   • Motion sickness    • Motion sickness    • Neck pain    • Obesity 1978   • Obesity (BMI 30 0-34  9)    • Pollen allergies    • RLS (restless legs syndrome)    • SCC (squamous cell carcinoma) 05/04/2021    left mid forehead   • Seasonal allergies    • Sleep apnea     has inspire   • Squamous cell skin cancer 2007    left cheek    • Swollen ankles    • 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, left     great toe fusion   • Urinary incontinence    • Urinary tract infection 03/28/2022   • Wears glasses          Surgical History     Past Surgical History:   Procedure Laterality Date   • ABDOMINAL SURGERY  [id number]    Nissen x2 1972 tubal ligarion   • ARTHROSCOPY KNEE     • BREAST BIOPSY      stereotactic-benign   • BREAST BIOPSY      stereo-benign   • BREAST EXCISIONAL BIOPSY      unknown date-benign   • BREAST EXCISIONAL BIOPSY      unknown date-benign   • BREAST EXCISIONAL BIOPSY      unknown date-benign   • BREAST EXCISIONAL BIOPSY      unknown age-benign   • CARDIAC CATHETERIZATION     • CHOLECYSTECTOMY     • COLONOSCOPY     • EXAMINATION UNDER ANESTHESIA N/A 06/24/2021    Procedure: EXAM UNDER ANESTHESIA (EUA), DISE;  Surgeon: Alissa Sher MD;  [facility];  Service: ENT   • HERNIA REPAIR     • HIATAL HERNIA REPAIR     • KNEE SURGERY      Torn maniscus lap surg   • LIPOSUCTION     • LYMPH NODE BIOPSY     • MOHS SURGERY  05/20/2021    left mid forehead-Gautam   • MOHS SURGERY Left 05/27/2021    Left nasal tip- gautam    • ME LIGATION/BIOPSY TEMPORAL ARTERY Left 01/05/2023    Procedure: BIOPSY ARTERY TEMPORAL;  Surgeon: Kory Castorena DO;  [facility]: [facility];  Service: Vascular   • ME OPEN IMPLANTATION CRANIAL NERVE VASQUEZ & PULSE GEN N/A 11/10/2021    Procedure: INSERTION UPPER AIRWAY STIMULATOR, INSPIRE IMPLANT;  Surgeon: Alissa Sher MD;  Location: [facility];  Service: ENT   • ME UNLISTED PROCEDURE FOOT/TOES Right 07/19/2022 Procedure: 1st metatarsal phalangeal joint fusion;  Surgeon: Katherine Camargo DPM;  Location: [facility];  Service: Podiatry   • REDUCTION MAMMAPLASTY Bilateral 2000   • REDUCTION MAMMAPLASTY     • SKIN BIOPSY     • SKIN CANCER EXCISION  2007    squamous cell carcinoma    • SKIN CANCER EXCISION  2007    basal cell carcinoma   • SQUAMOUS CELL CARCINOMA EXCISION     • TOE SURGERY Right 08/04/2022    Right Great Toe Fusion   • TONSILLECTOMY     • TUBAL LIGATION     • UPPER GASTROINTESTINAL ENDOSCOPY     • US GUIDED BREAST BIOPSY RIGHT COMPLETE Right 07/18/2022         Family History     Family History   Problem Relation Age of Onset   • Heart disease Mother    • Depression Mother    • Hypertension Mother    • COPD Mother    • Hearing loss Mother    • Anxiety disorder Mother    • Heart disease Father    • Lung cancer Father 79        Smoker    • Cancer Father         brain   • Alcohol abuse Father    • Dementia Father    • Hypertension Father    • Thyroid disease Father    • COPD Father    • Arthritis Father    • Brain cancer Father 76   • Hypertension Sister    • Diabetes Sister    • Heart disease Sister    • Thyroid disease Sister    • Cancer Sister         Lympoma, lung   • Lung cancer Sister 78   • Anxiety disorder Sister    • Hypertension Brother    • Diabetes Brother    • Cancer Brother         Throat   • Dementia Brother    • Stroke Brother    • Hypertension Brother    • Heart disease Brother    • Diabetes Brother    • Hypertension Brother    • No Known Problems Son    • No Known Problems Son    • Brain cancer Paternal Aunt         unknown age   • Diabetes Sister    • Hypertension Sister    • Diabetes Brother    • Breast cancer Neg Hx          Social History     Social History       Radiology

## 2023-04-05 NOTE — TELEPHONE ENCOUNTER
----- Message from Jeremiah Bates MD sent at 4/4/2023  1:12 PM EDT -----  Creatinine higher  Please review medications  Make sure patient is off NSAIDs  Make sure she is eating and drinking well  Recommend:  1  Depending upon the medications repeat a basic metabolic profile next week with good hydration  2    She needs a week of blood pressure readings      Once we know what meds she is on we can reevaluate

## 2023-04-06 LAB
BACTERIA UR CULT: ABNORMAL
IRON SATN MFR SERPL: 12 % (ref 15–50)
IRON SERPL-MCNC: 48 UG/DL (ref 50–170)
TIBC SERPL-MCNC: 400 UG/DL (ref 250–450)

## 2023-04-07 ENCOUNTER — HOSPITAL ENCOUNTER (OUTPATIENT)
Dept: ULTRASOUND IMAGING | Facility: HOSPITAL | Age: 73
Discharge: HOME | End: 2023-04-07

## 2023-04-07 DIAGNOSIS — N39.0 RECURRENT UTI: ICD-10-CM

## 2023-04-12 DIAGNOSIS — N39.0 RECURRENT UTI: Primary | ICD-10-CM

## 2023-04-12 RX ORDER — ESTRADIOL 0.1 MG/G
CREAM VAGINAL
Qty: 42.5 G | Refills: 2 | Status: SHIPPED | OUTPATIENT
Start: 2023-04-12

## 2023-04-25 ENCOUNTER — HOSPITAL ENCOUNTER (INPATIENT)
Facility: HOSPITAL | Age: 73
LOS: 3 days | Discharge: HOME | End: 2023-04-28
Attending: EMERGENCY MEDICINE | Admitting: INTERNAL MEDICINE

## 2023-04-25 ENCOUNTER — APPOINTMENT (EMERGENCY)
Dept: RADIOLOGY | Facility: HOSPITAL | Age: 73
End: 2023-04-25

## 2023-04-25 DIAGNOSIS — E87.5 HYPERKALEMIA: ICD-10-CM

## 2023-04-25 DIAGNOSIS — J44.1 COPD EXACERBATION (HCC): Primary | ICD-10-CM

## 2023-04-25 DIAGNOSIS — E87.1 HYPONATREMIA: ICD-10-CM

## 2023-04-25 DIAGNOSIS — N30.00 ACUTE CYSTITIS WITHOUT HEMATURIA: ICD-10-CM

## 2023-04-25 DIAGNOSIS — N18.9 ACUTE-ON-CHRONIC KIDNEY INJURY (HCC): ICD-10-CM

## 2023-04-25 DIAGNOSIS — N17.9 ACUTE-ON-CHRONIC KIDNEY INJURY (HCC): ICD-10-CM

## 2023-04-25 LAB
2HR DELTA HS TROPONIN: 1 NG/L
ALBUMIN SERPL BCP-MCNC: 4.2 G/DL (ref 3.5–5)
ALP SERPL-CCNC: 71 U/L (ref 34–104)
ALT SERPL W P-5'-P-CCNC: 16 U/L (ref 7–52)
ANION GAP SERPL CALCULATED.3IONS-SCNC: 11 MMOL/L (ref 4–13)
AST SERPL W P-5'-P-CCNC: 23 U/L (ref 13–39)
BASE EX.OXY STD BLDV CALC-SCNC: 82.4 % (ref 60–80)
BASE EXCESS BLDV CALC-SCNC: 0.7 MMOL/L
BASOPHILS # BLD AUTO: 0.04 THOUSANDS/ÂΜL (ref 0–0.1)
BASOPHILS NFR BLD AUTO: 0 % (ref 0–1)
BILIRUB SERPL-MCNC: 0.45 MG/DL (ref 0.2–1)
BUN SERPL-MCNC: 88 MG/DL (ref 5–25)
CALCIUM SERPL-MCNC: 10 MG/DL (ref 8.4–10.2)
CARDIAC TROPONIN I PNL SERPL HS: 4 NG/L
CARDIAC TROPONIN I PNL SERPL HS: 5 NG/L
CHLORIDE SERPL-SCNC: 93 MMOL/L (ref 96–108)
CO2 SERPL-SCNC: 25 MMOL/L (ref 21–32)
CREAT SERPL-MCNC: 3.5 MG/DL (ref 0.6–1.3)
EOSINOPHIL # BLD AUTO: 0.48 THOUSAND/ÂΜL (ref 0–0.61)
EOSINOPHIL NFR BLD AUTO: 5 % (ref 0–6)
ERYTHROCYTE [DISTWIDTH] IN BLOOD BY AUTOMATED COUNT: 14.1 % (ref 11.6–15.1)
FLUAV RNA RESP QL NAA+PROBE: NEGATIVE
FLUBV RNA RESP QL NAA+PROBE: NEGATIVE
GFR SERPL CREATININE-BSD FRML MDRD: 12 ML/MIN/1.73SQ M
GLUCOSE SERPL-MCNC: 177 MG/DL (ref 65–140)
HCO3 BLDV-SCNC: 25.5 MMOL/L (ref 24–30)
HCT VFR BLD AUTO: 38.9 % (ref 34.8–46.1)
HGB BLD-MCNC: 12.7 G/DL (ref 11.5–15.4)
IMM GRANULOCYTES # BLD AUTO: 0.08 THOUSAND/UL (ref 0–0.2)
IMM GRANULOCYTES NFR BLD AUTO: 1 % (ref 0–2)
LYMPHOCYTES # BLD AUTO: 2.83 THOUSANDS/ÂΜL (ref 0.6–4.47)
LYMPHOCYTES NFR BLD AUTO: 27 % (ref 14–44)
MCH RBC QN AUTO: 29.8 PG (ref 26.8–34.3)
MCHC RBC AUTO-ENTMCNC: 32.6 G/DL (ref 31.4–37.4)
MCV RBC AUTO: 91 FL (ref 82–98)
MONOCYTES # BLD AUTO: 0.93 THOUSAND/ÂΜL (ref 0.17–1.22)
MONOCYTES NFR BLD AUTO: 9 % (ref 4–12)
NEUTROPHILS # BLD AUTO: 6.26 THOUSANDS/ÂΜL (ref 1.85–7.62)
NEUTS SEG NFR BLD AUTO: 58 % (ref 43–75)
NRBC BLD AUTO-RTO: 0 /100 WBCS
O2 CT BLDV-SCNC: 15.2 ML/DL
PCO2 BLDV: 41.6 MM HG (ref 42–50)
PH BLDV: 7.41 [PH] (ref 7.3–7.4)
PLATELET # BLD AUTO: 242 THOUSANDS/UL (ref 149–390)
PMV BLD AUTO: 11.7 FL (ref 8.9–12.7)
PO2 BLDV: 47.2 MM HG (ref 35–45)
POTASSIUM SERPL-SCNC: 5.4 MMOL/L (ref 3.5–5.3)
PROT SERPL-MCNC: 7.3 G/DL (ref 6.4–8.4)
RBC # BLD AUTO: 4.26 MILLION/UL (ref 3.81–5.12)
RSV RNA RESP QL NAA+PROBE: NEGATIVE
SARS-COV-2 RNA RESP QL NAA+PROBE: NEGATIVE
SODIUM SERPL-SCNC: 129 MMOL/L (ref 135–147)
WBC # BLD AUTO: 10.62 THOUSAND/UL (ref 4.31–10.16)

## 2023-04-25 RX ADMIN — SODIUM CHLORIDE, SODIUM LACTATE, POTASSIUM CHLORIDE, AND CALCIUM CHLORIDE 500 ML: .6; .31; .03; .02 INJECTION, SOLUTION INTRAVENOUS at 22:20

## 2023-04-26 PROBLEM — E87.1 HYPONATREMIA: Status: ACTIVE | Noted: 2023-04-26

## 2023-04-26 PROBLEM — G92.8 TOXIC METABOLIC ENCEPHALOPATHY: Status: ACTIVE | Noted: 2023-01-07

## 2023-04-26 LAB
ANION GAP SERPL CALCULATED.3IONS-SCNC: 11 MMOL/L (ref 4–13)
BACTERIA UR QL AUTO: ABNORMAL /HPF
BILIRUB UR QL STRIP: NEGATIVE
BUN SERPL-MCNC: 89 MG/DL (ref 5–25)
CALCIUM SERPL-MCNC: 9.1 MG/DL (ref 8.4–10.2)
CHLORIDE SERPL-SCNC: 95 MMOL/L (ref 96–108)
CLARITY UR: ABNORMAL
CO2 SERPL-SCNC: 27 MMOL/L (ref 21–32)
COLOR UR: ABNORMAL
CREAT SERPL-MCNC: 3.54 MG/DL (ref 0.6–1.3)
ERYTHROCYTE [DISTWIDTH] IN BLOOD BY AUTOMATED COUNT: 14.1 % (ref 11.6–15.1)
GFR SERPL CREATININE-BSD FRML MDRD: 12 ML/MIN/1.73SQ M
GLUCOSE SERPL-MCNC: 125 MG/DL (ref 65–140)
GLUCOSE SERPL-MCNC: 164 MG/DL (ref 65–140)
GLUCOSE SERPL-MCNC: 171 MG/DL (ref 65–140)
GLUCOSE SERPL-MCNC: 190 MG/DL (ref 65–140)
GLUCOSE SERPL-MCNC: 205 MG/DL (ref 65–140)
GLUCOSE SERPL-MCNC: 232 MG/DL (ref 65–140)
GLUCOSE UR STRIP-MCNC: NEGATIVE MG/DL
HCT VFR BLD AUTO: 35.1 % (ref 34.8–46.1)
HGB BLD-MCNC: 11.2 G/DL (ref 11.5–15.4)
HGB UR QL STRIP.AUTO: ABNORMAL
KETONES UR STRIP-MCNC: NEGATIVE MG/DL
LEUKOCYTE ESTERASE UR QL STRIP: ABNORMAL
MAGNESIUM SERPL-MCNC: 2 MG/DL (ref 1.9–2.7)
MCH RBC QN AUTO: 29.4 PG (ref 26.8–34.3)
MCHC RBC AUTO-ENTMCNC: 31.9 G/DL (ref 31.4–37.4)
MCV RBC AUTO: 92 FL (ref 82–98)
MUCOUS THREADS UR QL AUTO: ABNORMAL
NITRITE UR QL STRIP: NEGATIVE
NON-SQ EPI CELLS URNS QL MICRO: ABNORMAL /HPF
PH UR STRIP.AUTO: 6 [PH]
PHOSPHATE SERPL-MCNC: 4.8 MG/DL (ref 2.3–4.1)
PLATELET # BLD AUTO: 206 THOUSANDS/UL (ref 149–390)
PMV BLD AUTO: 11.6 FL (ref 8.9–12.7)
POTASSIUM SERPL-SCNC: 4.4 MMOL/L (ref 3.5–5.3)
PROT UR STRIP-MCNC: ABNORMAL MG/DL
RBC # BLD AUTO: 3.81 MILLION/UL (ref 3.81–5.12)
RBC #/AREA URNS AUTO: ABNORMAL /HPF
SODIUM SERPL-SCNC: 133 MMOL/L (ref 135–147)
SP GR UR STRIP.AUTO: 1.01 (ref 1–1.03)
UROBILINOGEN UR STRIP-ACNC: <2 MG/DL
WBC # BLD AUTO: 8.76 THOUSAND/UL (ref 4.31–10.16)
WBC #/AREA URNS AUTO: ABNORMAL /HPF
WBC CLUMPS # UR AUTO: PRESENT /UL

## 2023-04-26 PROCEDURE — 0T9B70Z DRAINAGE OF BLADDER WITH DRAINAGE DEVICE, VIA NATURAL OR ARTIFICIAL OPENING: ICD-10-PCS | Performed by: INTERNAL MEDICINE

## 2023-04-26 RX ORDER — PRAMIPEXOLE DIHYDROCHLORIDE 0.25 MG/1
0.25 TABLET ORAL
Status: DISCONTINUED | OUTPATIENT
Start: 2023-04-26 | End: 2023-04-28 | Stop reason: HOSPADM

## 2023-04-26 RX ORDER — ECHINACEA PURPUREA EXTRACT 125 MG
1 TABLET ORAL
Status: DISCONTINUED | OUTPATIENT
Start: 2023-04-26 | End: 2023-04-28 | Stop reason: HOSPADM

## 2023-04-26 RX ORDER — ACETAMINOPHEN 325 MG/1
975 TABLET ORAL EVERY 8 HOURS SCHEDULED
Status: DISCONTINUED | OUTPATIENT
Start: 2023-04-26 | End: 2023-04-28 | Stop reason: HOSPADM

## 2023-04-26 RX ORDER — INSULIN LISPRO 100 [IU]/ML
25 INJECTION, SOLUTION INTRAVENOUS; SUBCUTANEOUS
Status: DISCONTINUED | OUTPATIENT
Start: 2023-04-26 | End: 2023-04-28 | Stop reason: HOSPADM

## 2023-04-26 RX ORDER — DULOXETIN HYDROCHLORIDE 60 MG/1
60 CAPSULE, DELAYED RELEASE ORAL DAILY
Status: DISCONTINUED | OUTPATIENT
Start: 2023-04-26 | End: 2023-04-28 | Stop reason: HOSPADM

## 2023-04-26 RX ORDER — AMLODIPINE BESYLATE 5 MG/1
5 TABLET ORAL DAILY
Status: DISCONTINUED | OUTPATIENT
Start: 2023-04-26 | End: 2023-04-28 | Stop reason: HOSPADM

## 2023-04-26 RX ORDER — GABAPENTIN 300 MG/1
300 CAPSULE ORAL
Status: DISCONTINUED | OUTPATIENT
Start: 2023-04-26 | End: 2023-04-28 | Stop reason: HOSPADM

## 2023-04-26 RX ORDER — ONDANSETRON 2 MG/ML
4 INJECTION INTRAMUSCULAR; INTRAVENOUS EVERY 6 HOURS PRN
Status: DISCONTINUED | OUTPATIENT
Start: 2023-04-26 | End: 2023-04-28 | Stop reason: HOSPADM

## 2023-04-26 RX ORDER — INSULIN LISPRO 100 [IU]/ML
1-5 INJECTION, SOLUTION INTRAVENOUS; SUBCUTANEOUS
Status: DISCONTINUED | OUTPATIENT
Start: 2023-04-26 | End: 2023-04-28 | Stop reason: HOSPADM

## 2023-04-26 RX ORDER — ALBUTEROL SULFATE 90 UG/1
2 AEROSOL, METERED RESPIRATORY (INHALATION) EVERY 6 HOURS PRN
Status: DISCONTINUED | OUTPATIENT
Start: 2023-04-26 | End: 2023-04-28 | Stop reason: HOSPADM

## 2023-04-26 RX ORDER — SODIUM CHLORIDE 9 MG/ML
75 INJECTION, SOLUTION INTRAVENOUS CONTINUOUS
Status: DISCONTINUED | OUTPATIENT
Start: 2023-04-26 | End: 2023-04-28 | Stop reason: HOSPADM

## 2023-04-26 RX ORDER — HEPARIN SODIUM 5000 [USP'U]/ML
5000 INJECTION, SOLUTION INTRAVENOUS; SUBCUTANEOUS EVERY 8 HOURS SCHEDULED
Status: DISCONTINUED | OUTPATIENT
Start: 2023-04-26 | End: 2023-04-28 | Stop reason: HOSPADM

## 2023-04-26 RX ORDER — INSULIN LISPRO 100 [IU]/ML
16 INJECTION, SOLUTION INTRAVENOUS; SUBCUTANEOUS
Status: DISCONTINUED | OUTPATIENT
Start: 2023-04-26 | End: 2023-04-28 | Stop reason: HOSPADM

## 2023-04-26 RX ORDER — METOPROLOL TARTRATE 50 MG/1
50 TABLET, FILM COATED ORAL EVERY 12 HOURS SCHEDULED
Status: DISCONTINUED | OUTPATIENT
Start: 2023-04-26 | End: 2023-04-28 | Stop reason: HOSPADM

## 2023-04-26 RX ADMIN — INSULIN LISPRO 1 UNITS: 100 INJECTION, SOLUTION INTRAVENOUS; SUBCUTANEOUS at 01:35

## 2023-04-26 RX ADMIN — ACETAMINOPHEN 975 MG: 325 TABLET ORAL at 16:45

## 2023-04-26 RX ADMIN — GABAPENTIN 300 MG: 300 CAPSULE ORAL at 21:17

## 2023-04-26 RX ADMIN — SODIUM CHLORIDE 75 ML/HR: 0.9 INJECTION, SOLUTION INTRAVENOUS at 01:34

## 2023-04-26 RX ADMIN — HEPARIN SODIUM 5000 UNITS: 5000 INJECTION INTRAVENOUS; SUBCUTANEOUS at 21:17

## 2023-04-26 RX ADMIN — PRAMIPEXOLE DIHYDROCHLORIDE 0.25 MG: 0.25 TABLET ORAL at 21:17

## 2023-04-26 RX ADMIN — HEPARIN SODIUM 5000 UNITS: 5000 INJECTION INTRAVENOUS; SUBCUTANEOUS at 14:07

## 2023-04-26 RX ADMIN — DULOXETINE HYDROCHLORIDE 60 MG: 60 CAPSULE, DELAYED RELEASE ORAL at 09:04

## 2023-04-26 RX ADMIN — INSULIN LISPRO 1 UNITS: 100 INJECTION, SOLUTION INTRAVENOUS; SUBCUTANEOUS at 09:04

## 2023-04-26 RX ADMIN — METOPROLOL TARTRATE 50 MG: 50 TABLET, FILM COATED ORAL at 01:34

## 2023-04-26 RX ADMIN — HEPARIN SODIUM 5000 UNITS: 5000 INJECTION INTRAVENOUS; SUBCUTANEOUS at 05:15

## 2023-04-26 RX ADMIN — SODIUM CHLORIDE 75 ML/HR: 0.9 INJECTION, SOLUTION INTRAVENOUS at 21:31

## 2023-04-26 RX ADMIN — INSULIN LISPRO 1 UNITS: 100 INJECTION, SOLUTION INTRAVENOUS; SUBCUTANEOUS at 16:46

## 2023-04-26 RX ADMIN — INSULIN LISPRO 25 UNITS: 100 INJECTION, SOLUTION INTRAVENOUS; SUBCUTANEOUS at 16:46

## 2023-04-26 RX ADMIN — ACETAMINOPHEN 975 MG: 325 TABLET ORAL at 01:34

## 2023-04-26 RX ADMIN — CEFTRIAXONE SODIUM 1000 MG: 10 INJECTION, POWDER, FOR SOLUTION INTRAVENOUS at 10:05

## 2023-04-26 RX ADMIN — INSULIN LISPRO 16 UNITS: 100 INJECTION, SOLUTION INTRAVENOUS; SUBCUTANEOUS at 11:51

## 2023-04-26 RX ADMIN — INSULIN LISPRO 16 UNITS: 100 INJECTION, SOLUTION INTRAVENOUS; SUBCUTANEOUS at 09:04

## 2023-04-26 RX ADMIN — INSULIN LISPRO 2 UNITS: 100 INJECTION, SOLUTION INTRAVENOUS; SUBCUTANEOUS at 11:50

## 2023-04-26 RX ADMIN — ACETAMINOPHEN 975 MG: 325 TABLET ORAL at 09:05

## 2023-04-26 RX ADMIN — INSULIN DETEMIR 50 UNITS: 100 INJECTION, SOLUTION SUBCUTANEOUS at 16:45

## 2023-04-26 NOTE — ASSESSMENT & PLAN NOTE
· Patient has had some URI sx over the last few days--  sick w/ similar sx  · No wheezing on physical exam  · Nebs PRN  · Hold on steroids for now

## 2023-04-26 NOTE — ED PROCEDURE NOTE
PROCEDURE  ECG 12 Lead Documentation Only    Date/Time: 4/25/2023 10:39 PM  Performed by: Erika Francis MD  Authorized by: Erika Francis MD     Indications / Diagnosis:  SOB  ECG reviewed by me, the ED Provider: yes    Patient location:  ED and bedside  Previous ECG:     Previous ECG:  Compared to current    Comparison ECG info:  3/24/23    Similarity:  No change    Comparison to cardiac monitor: Yes    Interpretation:     Interpretation: non-specific    Rate:     ECG rate:  67    ECG rate assessment: normal    Rhythm:     Rhythm: sinus rhythm    Ectopy:     Ectopy: none    QRS:     QRS axis:  Normal    QRS intervals:  Normal  Conduction:     Conduction: abnormal      Abnormal conduction: 1st degree    ST segments:     ST segments:  Normal  T waves:     T waves: flattening      Flattening:  AVL and V1  Q waves:     Q waves:  V1  Other findings:     Other findings: U wave    Comments:      NO ECG SIGNS OF ISCHEMIA/ INJURY          Erika Francis MD  04/25/23 [id number]

## 2023-04-26 NOTE — ASSESSMENT & PLAN NOTE
· Patient reports intermittent confusion over the last few days-- she was aware that she was saying things that did not make sense at the time, but now seems to be alert and oriented x4  · In setting of: ANDRA, ? UTI, hypotension, dehydration  · IVF hydration  · Check UA  · Avoid hypotension  · Supportive care

## 2023-04-26 NOTE — ASSESSMENT & PLAN NOTE
Lab Results   Component Value Date    EGFR 12 04/25/2023    EGFR 19 04/04/2023    EGFR 24 03/27/2023    CREATININE 3 50 (H) 04/25/2023    CREATININE 2 42 (H) 04/04/2023    CREATININE 1 99 (H) 03/27/2023   · Patient's Cr rising since starting on Spironolactone on 3/21/23   Patient additionally admits to hypotension over the last few days  · Additionally w/ hyponatremia  · Hold nephrotoxins  · Gentle IVF hydration  · Avoid hypotension  · Monitor urine output/retention protocol-- in ED bladder scan 200 mL and patient w/o sensation to urinate  · Nephrology consult-- appreciate input-- follows w/ Dr Bridger Erickson

## 2023-04-26 NOTE — H&P
[facility]  H&P  Name: Karyn Hernandez 75 y o  female I MRN: [id number]  Unit/Bed#: W -01 I Date of Admission: 4/25/2023   Date of Service: 4/26/2023 I Hospital Day: 1      Assessment/Plan   * Acute renal failure superimposed on stage 4 chronic kidney disease [facility])  Assessment & Plan  Lab Results   Component Value Date    EGFR 12 04/25/2023    EGFR 19 04/04/2023    EGFR 24 03/27/2023    CREATININE 3 50 (H) 04/25/2023    CREATININE 2 42 (H) 04/04/2023    CREATININE 1 99 (H) 03/27/2023   · Patient's Cr rising since starting on Spironolactone on 3/21/23   Patient additionally admits to hypotension over the last few days  · Additionally w/ hyponatremia  · Hold nephrotoxins  · Gentle IVF hydration  · Avoid hypotension  · Monitor urine output/retention protocol-- in ED bladder scan 200 mL and patient w/o sensation to urinate  · Nephrology consult-- appreciate input-- follows w/ Dr Nadir Darnell    Hyponatremia  Assessment & Plan  · Na 129  · In setting of ANDRA, spironolactone use  · Gentle IVF  · Repeat BMP in AM  · Nephrology consulted--- appreciate input    Toxic metabolic encephalopathy  Assessment & Plan  · Patient reports intermittent confusion over the last few days-- she was aware that she was saying things that did not make sense at the time, but now seems to be alert and oriented x4  · In setting of: ANDRA, ? UTI, hypotension, dehydration  · IVF hydration  · Check UA  · Avoid hypotension  · Supportive care    COPD (chronic obstructive pulmonary disease) ([facility] [address] )  Assessment & Plan  · Patient has had some URI sx over the last few days--  sick w/ similar sx  · No wheezing on physical exam  · Nebs PRN  · Hold on steroids for now    Anemia in stage 4 chronic kidney disease ([facility] [address] )  Assessment & Plan  · hgb 12 7  · Continue to monitor    HTN (hypertension)  Assessment & Plan  · /82  · Admits to hypotension over the last few days  · Increase hold parameter to SBP <130 in setting of ANDRA  · Hold nephrotoxins: olmesartan  spironolactone    Type 2 diabetes mellitus with diabetic nephropathy, with long-term current use of insulin (HCC)  Assessment & Plan  Lab Results   Component Value Date    HGBA1C 6 8 (H) 03/07/2023       No results for input(s): POCGLU in the last 72 hours  Blood Sugar Average: Last 72 hrs:  · Hbg a1c <7% per most recent labs   · Continue home insulin regimen: levemir 50U SQ QHS, Novolog 16/16/25U SQ w/ meals  · Add SSI for correction w/ accuchecks       VTE Pharmacologic Prophylaxis: VTE Score: 3 Moderate Risk (Score 3-4) - Pharmacological DVT Prophylaxis Ordered: heparin  Code Status: Level 3 - DNAR and DNI   Discussion with family: Patient declined call to   Anticipated Length of Stay: Patient will be admitted on an inpatient basis with an anticipated length of stay of greater than 2 midnights secondary to tx ANDRA  Total Time Spent on Date of Encounter in care of patient: 40 minutes This time was spent on one or more of the following: performing physical exam; counseling and coordination of care; obtaining or reviewing history; documenting in the medical record; reviewing/ordering tests, medications or procedures; communicating with other healthcare professionals and discussing with patient's family/caregivers  Chief Complaint: confusion, URI sx    History of Present Illness:  Richy Haro is a 75 y o  female with a PMH of CKD IV, HTN, DM 2 on insulin, COPD, BARBARA s/p inspire who presents with confusion  Per patient, she has been confused over the last few days  She is aware that she has been confused  She reports intermittent episodes of saying things that don't make sense and also not being able to do normal tasks such as organise her own pills  Her  also noticed that she seemed to have some mild URI and her O2 was low in the high 80s (does not wear o2 during the day, only at night as needed)   She additionally reports hypotension over the last "week  She has been taking all BP medications  She was recently started on spironolactone at the end of March  She was instructed to take this QHS, but had been taking this during the day up until the last week  She also reports nasal congestion that was yellow-green in color when she blew her nose  She reports that she had a fall recently where she broke bones in her face and she had not been allowed to blow her nose hard  She was recently allowed to start blowing her nose more and this resulted after having a few episodes of clear nasal secretions  She denies fevers/chills  She is eating/drinking well  No N/V/Diarrhea  Review of Systems:  Review of Systems   Constitutional: Negative  HENT: Positive for congestion  Eyes: Negative  Respiratory: Positive for cough  Cardiovascular: Negative  Gastrointestinal: Negative  Genitourinary: Negative  Musculoskeletal: Negative  Skin: Negative  Neurological: Negative  Hematological: Negative  Psychiatric/Behavioral: Positive for confusion         Past Medical and Surgical History:   Past Medical History:   Diagnosis Date   • Allergic    • Allergic rhinitis    • Anesthesia     pt reports \"had to use double lumen endo tube for hiatal hernia repair/so surgeon could get to where he needed to work\"   • Arthritis    • Aspiration into airway    • [place] esophagus 1993    Lot of stress with children   • Basal cell carcinoma 2007    left cheek    • BCC (basal cell carcinoma) 05/27/2021    Left Nasal tip   • Cancer ([address] )     squamous cell cancer on forhead   • Cancer ([address] )     basal cell on nose    • Cholelithiasis    • Chronic kidney disease 2000, 2018    Stones, kidney disease stage 4   • Chronic pain disorder     bilat feet and joint pain on occas   • Colon polyp    • Constipation    • COPD (chronic obstructive pulmonary disease) ([address] )    • COVID-19 08/2021    recovered at home/did receive monoclonal infusion   • Dental bridge present    • " Depression    • Diabetes mellitus ([place] [address] )     Type 2   • Diabetic neuropathy ([place] [address] )    • Disease of thyroid gland    • Family history of thyroid problem    • Fatty liver    • GERD (gastroesophageal reflux disease)    • Heart burn    • Hiatal hernia    • History of pneumonia    • Hyperlipidemia    • Hyperplasia, parathyroid ([place] [address] )    • Hypertension    • Kidney problem    • Kidney stone    • Memory loss Julu2 2020   • Motion sickness    • Motion sickness    • Neck pain    • Obesity 1978   • Obesity (BMI 30 0-34  9)    • Pollen allergies    • RLS (restless legs syndrome)    • SCC (squamous cell carcinoma) 05/04/2021    left mid forehead   • Seasonal allergies    • Sleep apnea     has inspire   • Squamous cell skin cancer 2007    left cheek    • Swollen ankles    • 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, left     great toe fusion   • Urinary incontinence    • Urinary tract infection 03/28/2022   • Wears glasses        Past Surgical History:   Procedure Laterality Date   • ABDOMINAL SURGERY  [id number]    Nissen x2 1972 tubal ligarion   • ARTHROSCOPY KNEE     • BREAST BIOPSY      stereotactic-benign   • BREAST BIOPSY      stereo-benign   • BREAST EXCISIONAL BIOPSY      unknown date-benign   • BREAST EXCISIONAL BIOPSY      unknown date-benign   • BREAST EXCISIONAL BIOPSY      unknown date-benign   • BREAST EXCISIONAL BIOPSY      unknown age-benign   • CARDIAC CATHETERIZATION     • CHOLECYSTECTOMY     • COLONOSCOPY     • EXAMINATION UNDER ANESTHESIA N/A 06/24/2021    Procedure: EXAM UNDER ANESTHESIA (EUA), DISE;  Surgeon: Dalton Malave MD;  Location: [facility];  Service: ENT   • HERNIA REPAIR     • HIATAL HERNIA REPAIR     • KNEE SURGERY      Torn maniscus lap surg   • LIPOSUCTION     • LYMPH NODE BIOPSY     • MOHS SURGERY  05/20/2021    left mid forehead-Gautam   • MOHS SURGERY Left 05/27/2021    Left nasal tip- gautam    • KY LIGATION/BIOPSY TEMPORAL ARTERY Left 01/05/2023    Procedure: BIOPSY ARTERY TEMPORAL;  Surgeon: Stacie Oconnell DO;  Location: [facility];  Service: Vascular   • NC OPEN IMPLANTATION CRANIAL NERVE VASQUEZ & PULSE GEN N/A 11/10/2021    Procedure: INSERTION UPPER AIRWAY STIMULATOR, INSPIRE IMPLANT;  Surgeon: Venita Lama MD;  Location: [facility];  Service: ENT   • NC UNLISTED PROCEDURE FOOT/TOES Right 07/19/2022    Procedure: 1st metatarsal phalangeal joint fusion;  Surgeon: Leopold Moos, DPM;  Location: [facility];  Service: Podiatry   • REDUCTION MAMMAPLASTY Bilateral 2000   • REDUCTION MAMMAPLASTY     • SKIN BIOPSY     • SKIN CANCER EXCISION  2007    squamous cell carcinoma    • SKIN CANCER EXCISION  2007    basal cell carcinoma   • SQUAMOUS CELL CARCINOMA EXCISION     • TOE SURGERY Right 08/04/2022    Right Great Toe Fusion   • TONSILLECTOMY     • TUBAL LIGATION     • UPPER GASTROINTESTINAL ENDOSCOPY     • US GUIDED BREAST BIOPSY RIGHT COMPLETE Right 07/18/2022       Meds/Allergies:  Prior to Admission medications    Medication Sig Start Date End Date Taking?  Authorizing Provider   estradiol (ESTRACE VAGINAL) 0 1 mg/g vaginal cream Apply pea sized amount around urethra and inside vaginal opening 3 times weekly 4/12/23   Bethany Johnston PA-C   acetaminophen (TYLENOL) 325 mg tablet Take 3 tablets (975 mg total) by mouth every 8 (eight) hours 3/23/23   Mela Laurent PA-C   albuterol (Ventolin HFA) 90 mcg/act inhaler Inhale 2 puffs every 6 (six) hours as needed for wheezing 8/4/22   [facility],    amLODIPine (NORVASC) 5 mg tablet Take 1 tablet (5 mg total) by mouth daily 2/14/23   Jeannette Roland MD   b complex vitamins capsule Take 1 capsule by mouth daily    Historical Provider, MD LAID ULTRAFINE III SHORT PEN 31G X 8 MM MISC USE AS DIRECTED 4 TIMES PER DAY  Patient not taking: Reported on 4/11/2023 7/26/19   Historical Provider, MD   Calcium Citrate 1040 MG TABS Take 500 mg by mouth Three times a day 11/16/22   Historical Provider, MD   DULoxetine (CYMBALTA) 30 mg delayed release capsule Take 60 mg by mouth daily 9/7/22   Historical Provider, MD   gabapentin (Neurontin) 300 mg capsule Take 1 capsule (300 mg total) by mouth daily at bedtime 3/30/23   Tani Diaz DO   insulin aspart (NovoLOG) 100 units/mL injection Inject under the skin 3 (three) times a day before meals 16 units, 16 units, 25 units  Historical Provider, MD   insulin detemir (Levemir FlexTouch) 100 Units/mL injection pen Inject 50 Units under the skin every evening  6/8/21   Historical Provider, MD   metoprolol tartrate (LOPRESSOR) 50 mg tablet TAKE 1 TABLET (50 MG TOTAL) BY MOUTH EVERY 12 (TWELVE) HOURS 5/12/22   Lui Fontanez MD   olmesartan (BENICAR) 40 mg tablet Take 1 tablet (40 mg total) by mouth daily at bedtime 2/13/23   Lui Fontanez MD   pantoprazole (PROTONIX) 40 mg tablet TAKE 1 TABLET BY MOUTH TWICE A DAY 10/30/22   Jose Mijares PA-C   pramipexole (MIRAPEX) 0 25 mg tablet Take 1 tablet (0 25 mg total) by mouth daily at bedtime 2/15/23   TC Gipson   Probiotic Product (PROBIOTIC PO) Take 1 capsule by mouth 2 (two) times a day    Historical Provider, MD   sodium chloride (OCEAN) 0 65 % nasal spray 1 spray into each nostril every hour as needed for congestion 3/23/23   Mela Murcia PA-C   torsemide (DEMADEX) 20 mg tablet TAKE 1 TABLET BY MOUTH EVERY DAY 7/25/22   Lui Fontanez MD   Vitamin D, Ergocalciferol, 2000 units CAPS Take 2,000 Units by mouth daily     Historical Provider, MD     I have reviewed home medications with patient personally  Allergies:    Allergies   Allergen Reactions   • Sulfamethoxazole-Trimethoprim Other (See Comments)     Tongue swelling   • Ciprofloxacin Hives and Itching       Social History:  Marital Status: /Civil Union   Occupation: retired RT  Patient Pre-hospital Living Situation: Home  Patient Pre-hospital Level of Mobility: walks  Patient Pre-hospital Diet Restrictions: none  Substance Use History:   Social History     Substance and Sexual "Activity   Alcohol Use Yes    Comment: 1 or 2 a year     Social History     Tobacco Use   Smoking Status Former   • Packs/day: 2 00   • Years: 10 00   • Pack years: 20 00   • Types: Cigarettes   • Start date: 1968   • Quit date: 1976   • Years since quittin 3   Smokeless Tobacco Never   Tobacco Comments    Smoked 2 pack a day     Social History     Substance and Sexual Activity   Drug Use No       Family History:  Family History   Problem Relation Age of Onset   • Heart disease Mother    • Depression Mother    • Hypertension Mother    • COPD Mother    • Hearing loss Mother    • Anxiety disorder Mother    • Heart disease Father    • Lung cancer Father 79        Smoker    • Cancer Father         brain   • Alcohol abuse Father    • Dementia Father    • Hypertension Father    • Thyroid disease Father    • COPD Father    • Arthritis Father    • Brain cancer Father 76   • Hypertension Sister    • Diabetes Sister    • Heart disease Sister    • Thyroid disease Sister    • Cancer Sister         Lympoma, lung   • Lung cancer Sister 78   • Anxiety disorder Sister    • Hypertension Brother    • Diabetes Brother    • Cancer Brother         Throat   • Dementia Brother    • Stroke Brother    • Hypertension Brother    • Heart disease Brother    • Diabetes Brother    • Hypertension Brother    • No Known Problems Son    • No Known Problems Son    • Brain cancer Paternal Aunt         unknown age   • Diabetes Sister    • Hypertension Sister    • Diabetes Brother    • Breast cancer Neg Hx        Physical Exam:     Vitals:   Blood Pressure: 134/82 (23)  Pulse: 68 (23)  Temperature: 98 °F (36 7 °C) (23)  Temp Source: Oral (23)  Respirations: 18 (23)  Height: 5' 2\" (157 5 cm) (23)  Weight - Scale: 65 5 kg (144 lb 6 4 oz) (23)  SpO2: 94 % (23)    Physical Exam  Constitutional:       General: She is not in acute distress       Appearance: " Normal appearance  She is obese  She is not ill-appearing  HENT:      Head: Normocephalic  Mouth/Throat:      Mouth: Mucous membranes are dry  Eyes:      Pupils: Pupils are equal, round, and reactive to light  Cardiovascular:      Rate and Rhythm: Normal rate and regular rhythm  Heart sounds: No murmur heard  No friction rub  No gallop  Pulmonary:      Effort: Pulmonary effort is normal       Breath sounds: Normal breath sounds  No wheezing, rhonchi or rales  Abdominal:      General: Abdomen is flat  Bowel sounds are normal       Palpations: Abdomen is soft  Tenderness: There is no abdominal tenderness  There is no guarding  Musculoskeletal:      Right lower leg: No edema  Left lower leg: No edema  Skin:     General: Skin is warm and dry  Neurological:      General: No focal deficit present  Mental Status: She is alert and oriented to person, place, and time  Mental status is at baseline     Psychiatric:         Mood and Affect: Mood normal          Behavior: Behavior normal          Additional Data:     Lab Results:  Results from last 7 days   Lab Units 04/25/23 2008   WBC Thousand/uL 10 62*   HEMOGLOBIN g/dL 12 7   HEMATOCRIT % 38 9   PLATELETS Thousands/uL 242   NEUTROS PCT % 58   LYMPHS PCT % 27   MONOS PCT % 9   EOS PCT % 5     Results from last 7 days   Lab Units 04/25/23 2008   SODIUM mmol/L 129*   POTASSIUM mmol/L 5 4*   CHLORIDE mmol/L 93*   CO2 mmol/L 25   BUN mg/dL 88*   CREATININE mg/dL 3 50*   ANION GAP mmol/L 11   CALCIUM mg/dL 10 0   ALBUMIN g/dL 4 2   TOTAL BILIRUBIN mg/dL 0 45   ALK PHOS U/L 71   ALT U/L 16   AST U/L 23   GLUCOSE RANDOM mg/dL 177*                       Lines/Drains:  Invasive Devices     Peripheral Intravenous Line  Duration           Peripheral IV 04/25/23 Right;Ventral (anterior) Forearm <1 day                    Imaging: Reviewed radiology reports from this admission including: chest xray  XR chest pa & lateral    (Results Pending) EKG and Other Studies Reviewed on Admission:   · EKG: NSR  HR 67     ** Please Note: This note has been constructed using a voice recognition system   **

## 2023-04-26 NOTE — PLAN OF CARE
Problem: PAIN - ADULT  Goal: Verbalizes/displays adequate comfort level or baseline comfort level  Description: Interventions:  - Encourage patient to monitor pain and request assistance  - Assess pain using appropriate pain scale  - Administer analgesics based on type and severity of pain and evaluate response  - Implement non-pharmacological measures as appropriate and evaluate response  - Consider cultural and social influences on pain and pain management  - Notify physician/advanced practitioner if interventions unsuccessful or patient reports new pain  Outcome: Progressing     Problem: INFECTION - ADULT  Goal: Absence or prevention of progression during hospitalization  Description: INTERVENTIONS:  - Assess and monitor for signs and symptoms of infection  - Monitor lab/diagnostic results  - Monitor all insertion sites, i e  indwelling lines, tubes, and drains  - Monitor endotracheal if appropriate and nasal secretions for changes in amount and color  - [place] appropriate cooling/warming therapies per order  - Administer medications as ordered  - Instruct and encourage patient and family to use good hand hygiene technique  - Identify and instruct in appropriate isolation precautions for identified infection/condition  Outcome: Progressing  Goal: Absence of fever/infection during neutropenic period  Description: INTERVENTIONS:  - Monitor WBC    Outcome: Progressing     Problem: SAFETY ADULT  Goal: Patient will remain free of falls  Description: INTERVENTIONS:  - Educate patient/family on patient safety including physical limitations  - Instruct patient to call for assistance with activity   - Consult OT/PT to assist with strengthening/mobility   - Keep Call bell within reach  - Keep bed low and locked with side rails adjusted as appropriate  - Keep care items and personal belongings within reach  - Initiate and maintain comfort rounds  - Make Fall Risk Sign visible to staff  - Offer Toileting every  Hours, in advance of need  - Initiate/Maintain alarm  - Obtain necessary fall risk management equipment:   - Apply yellow socks and bracelet for high fall risk patients  - Consider moving patient to room near nurses station  Outcome: Progressing  Goal: Maintain or return to baseline ADL function  Description: INTERVENTIONS:  -  Assess patient's ability to carry out ADLs; assess patient's baseline for ADL function and identify physical deficits which impact ability to perform ADLs (bathing, care of mouth/teeth, toileting, grooming, dressing, etc )  - Assess/evaluate cause of self-care deficits   - Assess range of motion  - Assess patient's mobility; develop plan if impaired  - Assess patient's need for assistive devices and provide as appropriate  - Encourage maximum independence but intervene and supervise when necessary  - Involve family in performance of ADLs  - Assess for home care needs following discharge   - Consider OT consult to assist with ADL evaluation and planning for discharge  - Provide patient education as appropriate  Outcome: Progressing  Goal: Maintains/Returns to pre admission functional level  Description: INTERVENTIONS:  - Perform BMAT or MOVE assessment daily    - Set and communicate daily mobility goal to care team and patient/family/caregiver  - Collaborate with rehabilitation services on mobility goals if consulted  - Perform Range of Motion  times a day  - Reposition patient every  hours    - Dangle patient  times a day  - Stand patient  times a day  - Ambulate patient  times a day  - Out of bed to chair  times a day   - Out of bed for meals times a day  - Out of bed for toileting  - Record patient progress and toleration of activity level   Outcome: Progressing     Problem: DISCHARGE PLANNING  Goal: Discharge to home or other facility with appropriate resources  Description: INTERVENTIONS:  - Identify barriers to discharge w/patient and caregiver  - Arrange for needed discharge resources and transportation as appropriate  - Identify discharge learning needs (meds, wound care, etc )  - Arrange for interpretive services to assist at discharge as needed  - Refer to Case Management Department for coordinating discharge planning if the patient needs post-hospital services based on physician/advanced practitioner order or complex needs related to functional status, cognitive ability, or social support system  Outcome: Progressing     Problem: Knowledge Deficit  Goal: Patient/family/caregiver demonstrates understanding of disease process, treatment plan, medications, and discharge instructions  Description: Complete learning assessment and assess knowledge base    Interventions:  - Provide teaching at level of understanding  - Provide teaching via preferred learning methods  Outcome: Progressing Continue Regimen: Adapalene and Clindamycin Detail Level: Detailed

## 2023-04-26 NOTE — ASSESSMENT & PLAN NOTE
· Na 129  · In setting of ANDRA, spironolactone use  · Gentle IVF  · Repeat BMP in AM  · Nephrology consulted--- appreciate input

## 2023-04-26 NOTE — ASSESSMENT & PLAN NOTE
· /82  · Admits to hypotension over the last few days  · Increase hold parameter to SBP <130 in setting of ANDRA  · Hold nephrotoxins: olmesartan   spironolactone

## 2023-04-26 NOTE — CONSULTS
Consultation - Nephrology   Adelina Todd 75 y o  female MRN: [id number]  Unit/Bed#: W -01 Encounter: [id number]      Assessment/Plan     Assessment / Plan:  1  Renal    Patient is chronic kidney disease and per review of the record from her primary nephrologist baseline creatinine ranges one-point 5-2 with the most recent at his visit in February of this year of 1 7  There was some proteinuria so he attributes it to diabetic nephropathy as has done a work-up in the past   The patient is unable to urinate in the hospital she was not eating that great for couple days and was not drinking that much according to her   Creatinine is up to 3 5  She has been unable to urinate on her own and required urinary straight catheterization so urine retention may be playing a role  Place Breaux catheter and patient aware as well as nurse  Continue isotonic IV fluid repletion  Monitor renal function  Treating urinary tract infection    2  Infectious disease      The patient presented with some mental status changes and confusion and her  says this is happened in the past especially with a urinary tract infection  Urinalysis is very abnormal with bacteria white blood cells and suggests infection  I will communicate with the primary service to alert them so this can be treated as I do not see that she has received antibiotics  Culture is sent and pending  After communicating with the primary service they will treat it with antibiotics  Her mental status has returned to near her baseline according to her   History of Present Illness   Physician Requesting Consult: Macy Garcia MD  Reason for Consult / Principal Problem: Acute on chronic kidney disease present on admission  Hx and PE limited by:   HPI: Adelina Todd is a 75y o  year old female who has history of chronic kidney disease and is followed by primary nephrologist who became very confused and disoriented at home    Her  was concerned because her blood pressure was okay her blood sugar was okay and stated this usually happens when she has a urine infection so she was brought into the hospital   She was discovered to have increased creatinine above her baseline we were asked to see here for recommendations and management  History obtained from chart review and the patient and her  at the bedside  Inpatient consult to Nephrology  Consult performed by: Tiffani Dumas MD  Consult ordered by: Sharon Toure PA-C          Review of Systems   Constitutional: Positive for activity change, appetite change and fatigue  Negative for chills, diaphoresis and fever  HENT: Negative  Eyes: Negative  Respiratory: Negative for cough, chest tightness, shortness of breath and wheezing  Cardiovascular: Negative  Negative for chest pain, palpitations and leg swelling  No orthopnea  Gastrointestinal: Negative for abdominal pain, diarrhea, nausea and vomiting  Genitourinary: Positive for difficulty urinating  Negative for flank pain and hematuria  Patient says has been unable to urinate since she has been here  Patient states she does straight catheterization every evening  Musculoskeletal: Negative  Skin: Negative  Negative for rash  Neurological: Negative for dizziness, syncope, light-headedness and headaches  Psychiatric/Behavioral: Negative for agitation and hallucinations  The patient is not hyperactive  Mild confusion         Historical Information   Patient Active Problem List   Diagnosis   • Stage 4 chronic kidney disease ([facility] [address] )   • Hypertensive chronic kidney disease with stage 1 through stage 4 chronic kidney disease, or unspecified chronic kidney disease   • BARBARA (obstructive sleep apnea)   • RLS (restless legs syndrome)   • Persistent proteinuria   • Type 2 diabetes mellitus with diabetic nephropathy, with long-term current use of insulin ([facility] [address] )   • Pulmonary hypertension "(HCC)   • Dyspnea   • Acute respiratory failure with hypoxia (HCC)   • History of repair of hiatal hernia   • ANDRA (acute kidney injury) ([facility] [address] )   • Dyslipidemia   • Vitamin D deficiency   • Anemia of chronic renal failure, stage 3 (moderate) (HCC)   • Fibromyalgia   • Type 2 diabetes mellitus with diabetic chronic kidney disease (HCC)   • Leukocytosis   • History of kidney stones   • Urinary urgency   • HTN (hypertension)   • Ambulatory dysfunction   • Memory changes   • Gastroparesis diabeticorum (HCC)   • B12 neuropathy (HCC)   • Depression, recurrent (HCC)   • Chronic respiratory failure with hypoxia (HCC)   • Anemia in stage 4 chronic kidney disease (HCC)   • Epigastric abdominal pain with severe diabetic gastroparesis, status post EGD/Botox injection, 5/14   • COPD (chronic obstructive pulmonary disease) (HCC)   • BMI 29 0-29 9,adult   • Chronic constipation   • History of colon polyps   • COVID-19 virus infection   • Other chest pain   • Diabetic nephropathy associated with type 2 diabetes mellitus (HCC)   • Abdominal pain   • Abnormal thyroid blood test   • Hypotension   • Urinary retention   • UTI (urinary tract infection)   • S/P foot surgery   • Urinary incontinence   • Headache   • Hypothyroid   • 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   • Toxic metabolic encephalopathy   • Liver function abnormality   • Fall   • Fracture of orbital floor, blow-out, right, closed, with routine healing, subsequent encounter   • Nasal bones, closed fracture   • Facial abrasion, initial encounter   • Multiple closed fractures of ribs of right side   • Acute on chronic respiratory failure with hypoxemia ([facility] [address] )   • Acute renal failure superimposed on stage 4 chronic kidney disease (HCC)   • Seasonal allergies   • Elevated liver enzymes   • Hyponatremia     Past Medical History:   Diagnosis Date   • Allergic    • Allergic rhinitis    • Anesthesia     pt reports \"had to use double lumen endo tube for hiatal hernia " "repair/so surgeon could get to where he needed to work\"   • Arthritis    • Aspiration into airway    • Ahumada esophagus 1993    Lot of stress with children   • Basal cell carcinoma 2007    left cheek    • BCC (basal cell carcinoma) 05/27/2021    Left Nasal tip   • Cancer (HCC)     squamous cell cancer on forhead   • Cancer ([facility] [address] )     basal cell on nose    • Cholelithiasis    • Chronic kidney disease 2000, 2018    Stones, kidney disease stage 4   • Chronic pain disorder     bilat feet and joint pain on occas   • Colon polyp    • Constipation    • COPD (chronic obstructive pulmonary disease) ([facility] [address] )    • COVID-19 08/2021    recovered at home/did receive monoclonal infusion   • Dental bridge present    • Depression    • Diabetes mellitus ([facility]  )     Type 2   • Diabetic neuropathy ([place] [address] )    • Disease of thyroid gland    • Family history of thyroid problem    • Fatty liver    • GERD (gastroesophageal reflux disease)    • Heart burn    • Hiatal hernia    • History of pneumonia    • Hyperlipidemia    • Hyperplasia, parathyroid ([facility]  )    • Hypertension    • Kidney problem    • Kidney stone    • Memory loss Julu2 2020   • Motion sickness    • Motion sickness    • Neck pain    • Obesity 1978   • Obesity (BMI 30 0-34  9)    • Pollen allergies    • RLS (restless legs syndrome)    • SCC (squamous cell carcinoma) 05/04/2021    left mid forehead   • Seasonal allergies    • Sleep apnea     has inspire   • Squamous cell skin cancer 2007    left cheek    • Swollen ankles    • 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, left     great toe fusion   • Urinary incontinence    • Urinary tract infection 03/28/2022   • Wears glasses      Past Surgical History:   Procedure Laterality Date   • ABDOMINAL SURGERY  [id number]    Nissen x2 1972 tubal ligarion   • ARTHROSCOPY KNEE     • BREAST BIOPSY      stereotactic-benign   • BREAST BIOPSY      stereo-benign   • BREAST EXCISIONAL BIOPSY      unknown date-benign   • BREAST EXCISIONAL BIOPSY      unknown " date-benign   • BREAST EXCISIONAL BIOPSY      unknown date-benign   • BREAST EXCISIONAL BIOPSY      unknown age-benign   • CARDIAC CATHETERIZATION     • CHOLECYSTECTOMY     • COLONOSCOPY     • EXAMINATION UNDER ANESTHESIA N/A 06/24/2021    Procedure: EXAM UNDER ANESTHESIA (EUA), DISE;  Surgeon: Ijeoma Perez MD;  [facility];  Service: ENT   • HERNIA REPAIR     • HIATAL HERNIA REPAIR     • KNEE SURGERY      Torn maniscus lap surg   • LIPOSUCTION     • LYMPH NODE BIOPSY     • MOHS SURGERY  05/20/2021    left mid forehead-Gautam   • MOHS SURGERY Left 05/27/2021    Left nasal tip- gautam    • ND LIGATION/BIOPSY TEMPORAL ARTERY Left 01/05/2023    Procedure: BIOPSY ARTERY TEMPORAL;  Surgeon: Lucinda Garcia DO;  [facility];  Service: Vascular   • ND OPEN IMPLANTATION CRANIAL NERVE VASQUEZ & PULSE GEN N/A 11/10/2021    Procedure: INSERTION UPPER AIRWAY STIMULATOR, INSPIRE IMPLANT;  Surgeon: Ijeoma Perez MD;  [facility];  Service: ENT   • ND UNLISTED PROCEDURE FOOT/TOES Right 07/19/2022    Procedure: 1st metatarsal phalangeal joint fusion;  Surgeon: Wero Minor DPM;  [facility];  Service: Podiatry   • REDUCTION MAMMAPLASTY Bilateral 2000   • REDUCTION MAMMAPLASTY     • SKIN BIOPSY     • SKIN CANCER EXCISION  2007    squamous cell carcinoma    • SKIN CANCER EXCISION  2007    basal cell carcinoma   • SQUAMOUS CELL CARCINOMA EXCISION     • TOE SURGERY Right 08/04/2022    Right Great Toe Fusion   • TONSILLECTOMY     • TUBAL LIGATION     • UPPER GASTROINTESTINAL ENDOSCOPY     • US GUIDED BREAST BIOPSY RIGHT COMPLETE Right 07/18/2022     Social History   Social History     Substance and Sexual Activity   Alcohol Use Yes    Comment: 1 or 2 a year     Social History     Substance and Sexual Activity   Drug Use No     Social History     Tobacco Use   Smoking Status Former   • Packs/day: 2 00   • Years: 10 00   • Pack years: 20 00   • Types: Cigarettes   • Start date: 1/1/1968   • Quit date: 1976   • Years since quittin 3   Smokeless Tobacco Never   Tobacco Comments    Smoked 2 pack a day     Family History   Problem Relation Age of Onset   • Heart disease Mother    • Depression Mother    • Hypertension Mother    • COPD Mother    • Hearing loss Mother    • Anxiety disorder Mother    • Heart disease Father    • Lung cancer Father 79        Smoker    • Cancer Father         brain   • Alcohol abuse Father    • Dementia Father    • Hypertension Father    • Thyroid disease Father    • COPD Father    • Arthritis Father    • Brain cancer Father 76   • Hypertension Sister    • Diabetes Sister    • Heart disease Sister    • Thyroid disease Sister    • Cancer Sister         Lympoma, lung   • Lung cancer Sister 78   • Anxiety disorder Sister    • Hypertension Brother    • Diabetes Brother    • Cancer Brother         Throat   • Dementia Brother    • Stroke Brother    • Hypertension Brother    • Heart disease Brother    • Diabetes Brother    • Hypertension Brother    • No Known Problems Son    • No Known Problems Son    • Brain cancer Paternal [de-identified]         unknown age   • Diabetes Sister    • Hypertension Sister    • Diabetes Brother    • Breast cancer Neg Hx        Meds/Allergies   current meds:   Current Facility-Administered Medications   Medication Dose Route Frequency   • acetaminophen (TYLENOL) tablet 975 mg  975 mg Oral Q8H [facility]   • albuterol (PROVENTIL HFA,VENTOLIN HFA) inhaler 2 puff  2 puff Inhalation Q6H PRN   • amLODIPine (NORVASC) tablet 5 mg  5 mg Oral Daily   • DULoxetine (CYMBALTA) delayed release capsule 60 mg  60 mg Oral Daily   • gabapentin (NEURONTIN) capsule 300 mg  300 mg Oral HS   • heparin (porcine) subcutaneous injection 5,000 Units  5,000 Units Subcutaneous Q8H [facility]   • insulin detemir (LEVEMIR) subcutaneous injection 50 Units  50 Units Subcutaneous QPM   • insulin lispro (HumaLOG) 100 units/mL subcutaneous injection 1-5 Units  1-5 Units Subcutaneous TID AC   • insulin "lispro (HumaLOG) 100 units/mL subcutaneous injection 1-5 Units  1-5 Units Subcutaneous HS   • insulin lispro (HumaLOG) 100 units/mL subcutaneous injection 16 Units  16 Units Subcutaneous Daily With Breakfast   • insulin lispro (HumaLOG) 100 units/mL subcutaneous injection 16 Units  16 Units Subcutaneous Daily With Lunch   • insulin lispro (HumaLOG) 100 units/mL subcutaneous injection 25 Units  25 Units Subcutaneous Daily With Dinner   • metoprolol tartrate (LOPRESSOR) tablet 50 mg  50 mg Oral Q12H [address]   • ondansetron (ZOFRAN) injection 4 mg  4 mg Intravenous Q6H PRN   • pramipexole (MIRAPEX) tablet 0 25 mg  0 25 mg Oral HS   • sodium chloride (OCEAN) 0 65 % nasal spray 1 spray  1 spray Each Nare Q1H PRN   • sodium chloride 0 9 % infusion  75 mL/hr Intravenous Continuous     Allergies   Allergen Reactions   • Sulfamethoxazole-Trimethoprim Other (See Comments)     Tongue swelling   • Ciprofloxacin Hives and Itching       Objective     Intake/Output Summary (Last 24 hours) at 4/26/2023 0918  Last data filed at 4/26/2023 0031  Gross per 24 hour   Intake --   Output 350 ml   Net -350 ml     Body mass index is 26 41 kg/m²  Invasive Devices:        PHYSICAL EXAM:  /70 (BP Location: Right arm)   Pulse 64   Temp (!) 97 3 °F (36 3 °C) (Oral)   Resp 17   Ht 5' 2\" (1 575 m)   Wt 65 5 kg (144 lb 6 4 oz)   SpO2 98%   BMI 26 41 kg/m²     Physical Exam  Constitutional:       General: She is not in acute distress  Appearance: She is not toxic-appearing or diaphoretic  HENT:      Head: Normocephalic and atraumatic  Nose: Nose normal       Mouth/Throat:      Mouth: Mucous membranes are dry  Eyes:      General: No scleral icterus  Extraocular Movements: Extraocular movements intact  Cardiovascular:      Rate and Rhythm: Normal rate and regular rhythm  Heart sounds: No friction rub  No gallop  Pulmonary:      Effort: Pulmonary effort is normal  No respiratory distress        Breath sounds: No " wheezing, rhonchi or rales  Abdominal:      General: Bowel sounds are normal  There is no distension  Palpations: Abdomen is soft  Tenderness: There is no abdominal tenderness  There is no rebound  Musculoskeletal:      Cervical back: Normal range of motion and neck supple  Skin:     General: Skin is warm and dry  Neurological:      General: No focal deficit present  Mental Status: She is alert and oriented to person, place, and time  Mental status is at baseline             Current Weight: Weight - Scale: 65 5 kg (144 lb 6 4 oz)  First Weight: Weight - Scale: 65 5 kg (144 lb 6 4 oz)    Lab Results:    Results from last 7 days   Lab Units 04/26/23 0458   WBC Thousand/uL 8 76   HEMOGLOBIN g/dL 11 2*   HEMATOCRIT % 35 1   PLATELETS Thousands/uL 206     Results from last 7 days   Lab Units 04/26/23 0458   POTASSIUM mmol/L 4 4   CHLORIDE mmol/L 95*   CO2 mmol/L 27   BUN mg/dL 89*   CREATININE mg/dL 3 54*   CALCIUM mg/dL 9 1     Results from last 7 days   Lab Units 04/26/23 0458 04/25/23 2008   POTASSIUM mmol/L 4 4 5 4*   CHLORIDE mmol/L 95* 93*   CO2 mmol/L 27 25   BUN mg/dL 89* 88*   CREATININE mg/dL 3 54* 3 50*   CALCIUM mg/dL 9 1 10 0   ALK PHOS U/L  --  71   ALT U/L  --  16   AST U/L  --  23

## 2023-04-26 NOTE — ASSESSMENT & PLAN NOTE
Lab Results   Component Value Date    HGBA1C 6 8 (H) 03/07/2023       No results for input(s): POCGLU in the last 72 hours      Blood Sugar Average: Last 72 hrs:  · Hbg a1c <7% per most recent labs   · Continue home insulin regimen: levemir 50U SQ QHS, Novolog 16/16/25U SQ w/ meals  · Add SSI for correction w/ accuchecks

## 2023-04-27 LAB
ANION GAP SERPL CALCULATED.3IONS-SCNC: 6 MMOL/L (ref 4–13)
ATRIAL RATE: 67 BPM
BASOPHILS # BLD AUTO: 0.03 THOUSANDS/ÂΜL (ref 0–0.1)
BASOPHILS NFR BLD AUTO: 0 % (ref 0–1)
BUN SERPL-MCNC: 70 MG/DL (ref 5–25)
CALCIUM SERPL-MCNC: 8.1 MG/DL (ref 8.4–10.2)
CHLORIDE SERPL-SCNC: 105 MMOL/L (ref 96–108)
CO2 SERPL-SCNC: 25 MMOL/L (ref 21–32)
CREAT SERPL-MCNC: 2.51 MG/DL (ref 0.6–1.3)
EOSINOPHIL # BLD AUTO: 0.38 THOUSAND/ÂΜL (ref 0–0.61)
EOSINOPHIL NFR BLD AUTO: 6 % (ref 0–6)
ERYTHROCYTE [DISTWIDTH] IN BLOOD BY AUTOMATED COUNT: 13.8 % (ref 11.6–15.1)
GFR SERPL CREATININE-BSD FRML MDRD: 18 ML/MIN/1.73SQ M
GLUCOSE SERPL-MCNC: 141 MG/DL (ref 65–140)
GLUCOSE SERPL-MCNC: 143 MG/DL (ref 65–140)
GLUCOSE SERPL-MCNC: 158 MG/DL (ref 65–140)
GLUCOSE SERPL-MCNC: 241 MG/DL (ref 65–140)
GLUCOSE SERPL-MCNC: 86 MG/DL (ref 65–140)
GLUCOSE SERPL-MCNC: 89 MG/DL (ref 65–140)
HCT VFR BLD AUTO: 32.6 % (ref 34.8–46.1)
HGB BLD-MCNC: 10.6 G/DL (ref 11.5–15.4)
IMM GRANULOCYTES # BLD AUTO: 0.08 THOUSAND/UL (ref 0–0.2)
IMM GRANULOCYTES NFR BLD AUTO: 1 % (ref 0–2)
LYMPHOCYTES # BLD AUTO: 2.33 THOUSANDS/ÂΜL (ref 0.6–4.47)
LYMPHOCYTES NFR BLD AUTO: 34 % (ref 14–44)
MCH RBC QN AUTO: 29.8 PG (ref 26.8–34.3)
MCHC RBC AUTO-ENTMCNC: 32.5 G/DL (ref 31.4–37.4)
MCV RBC AUTO: 92 FL (ref 82–98)
MONOCYTES # BLD AUTO: 0.58 THOUSAND/ÂΜL (ref 0.17–1.22)
MONOCYTES NFR BLD AUTO: 9 % (ref 4–12)
NEUTROPHILS # BLD AUTO: 3.41 THOUSANDS/ÂΜL (ref 1.85–7.62)
NEUTS SEG NFR BLD AUTO: 50 % (ref 43–75)
NRBC BLD AUTO-RTO: 0 /100 WBCS
P AXIS: 63 DEGREES
PLATELET # BLD AUTO: 193 THOUSANDS/UL (ref 149–390)
PMV BLD AUTO: 11.9 FL (ref 8.9–12.7)
POTASSIUM SERPL-SCNC: 4.4 MMOL/L (ref 3.5–5.3)
PR INTERVAL: 202 MS
QRS AXIS: -8 DEGREES
QRSD INTERVAL: 82 MS
QT INTERVAL: 420 MS
QTC INTERVAL: 443 MS
RBC # BLD AUTO: 3.56 MILLION/UL (ref 3.81–5.12)
SODIUM SERPL-SCNC: 136 MMOL/L (ref 135–147)
T WAVE AXIS: 56 DEGREES
VENTRICULAR RATE: 67 BPM
WBC # BLD AUTO: 6.81 THOUSAND/UL (ref 4.31–10.16)

## 2023-04-27 RX ORDER — MAGNESIUM HYDROXIDE/ALUMINUM HYDROXICE/SIMETHICONE 120; 1200; 1200 MG/30ML; MG/30ML; MG/30ML
30 SUSPENSION ORAL EVERY 4 HOURS PRN
Status: DISCONTINUED | OUTPATIENT
Start: 2023-04-27 | End: 2023-04-28 | Stop reason: HOSPADM

## 2023-04-27 RX ADMIN — HEPARIN SODIUM 5000 UNITS: 5000 INJECTION INTRAVENOUS; SUBCUTANEOUS at 05:25

## 2023-04-27 RX ADMIN — GABAPENTIN 300 MG: 300 CAPSULE ORAL at 21:11

## 2023-04-27 RX ADMIN — HEPARIN SODIUM 5000 UNITS: 5000 INJECTION INTRAVENOUS; SUBCUTANEOUS at 21:11

## 2023-04-27 RX ADMIN — HEPARIN SODIUM 5000 UNITS: 5000 INJECTION INTRAVENOUS; SUBCUTANEOUS at 15:08

## 2023-04-27 RX ADMIN — ACETAMINOPHEN 975 MG: 325 TABLET ORAL at 01:02

## 2023-04-27 RX ADMIN — ACETAMINOPHEN 975 MG: 325 TABLET ORAL at 07:50

## 2023-04-27 RX ADMIN — METOPROLOL TARTRATE 50 MG: 50 TABLET, FILM COATED ORAL at 07:50

## 2023-04-27 RX ADMIN — PRAMIPEXOLE DIHYDROCHLORIDE 0.25 MG: 0.25 TABLET ORAL at 21:11

## 2023-04-27 RX ADMIN — CEFTRIAXONE SODIUM 1000 MG: 10 INJECTION, POWDER, FOR SOLUTION INTRAVENOUS at 07:49

## 2023-04-27 RX ADMIN — ALBUTEROL SULFATE 2 PUFF: 90 AEROSOL, METERED RESPIRATORY (INHALATION) at 18:53

## 2023-04-27 RX ADMIN — DULOXETINE HYDROCHLORIDE 60 MG: 60 CAPSULE, DELAYED RELEASE ORAL at 07:50

## 2023-04-27 RX ADMIN — INSULIN LISPRO 2 UNITS: 100 INJECTION, SOLUTION INTRAVENOUS; SUBCUTANEOUS at 21:16

## 2023-04-27 RX ADMIN — SODIUM CHLORIDE 75 ML/HR: 0.9 INJECTION, SOLUTION INTRAVENOUS at 23:46

## 2023-04-27 RX ADMIN — INSULIN LISPRO 1 UNITS: 100 INJECTION, SOLUTION INTRAVENOUS; SUBCUTANEOUS at 07:49

## 2023-04-27 RX ADMIN — ACETAMINOPHEN 975 MG: 325 TABLET ORAL at 17:04

## 2023-04-27 RX ADMIN — AMLODIPINE BESYLATE 5 MG: 5 TABLET ORAL at 07:50

## 2023-04-27 RX ADMIN — INSULIN DETEMIR 50 UNITS: 100 INJECTION, SOLUTION SUBCUTANEOUS at 17:04

## 2023-04-27 RX ADMIN — SODIUM CHLORIDE 75 ML/HR: 0.9 INJECTION, SOLUTION INTRAVENOUS at 10:54

## 2023-04-27 RX ADMIN — ALUMINA, MAGNESIA, AND SIMETHICONE ORAL SUSPENSION REGULAR STRENGTH 30 ML: 1200; 1200; 120 SUSPENSION ORAL at 01:02

## 2023-04-27 RX ADMIN — INSULIN LISPRO 16 UNITS: 100 INJECTION, SOLUTION INTRAVENOUS; SUBCUTANEOUS at 07:49

## 2023-04-27 NOTE — PROGRESS NOTES
NEPHROLOGY PROGRESS NOTE    Sandra Yusuf 75 y o  female MRN: [id number]  Unit/Bed#: W -01 Encounter: [id number]  Reason for Consult: Acute on chronic kidney disease    The patient is awake and alert feels she is back to her baseline is no confusion is eating well  Breaux catheter was placed she says the urine initially was very cloudy and whitish and that is now cleared  ASSESSMENT/PLAN:  1  Renal    The patient's baseline creatinine is one-point 5-2 felt due to diabetic nephropathy  She presented with confusion was found to have urinary tract infection by urinalysis and urine cultures pending  Her  states that sometimes she does get confused when a urinary tract infection begins  She had IV fluids as she likely was volume depleted and Breaux catheter placed as she performs straight catheterization at night and has increased postvoid residual   There was no hydronephrosis but she had excellent urine output with Breaux catheter in place  Overnight creatinine significantly improved to 2 5  Electrolytes are normal including sodium which is corrected to 136 mill equivalents per liter  Hyponatremia has resolved  Continue Breaux catheter while in hospital   We discussed that she follows up with urology she does straight catheterization and she will continue this on discharge  I mention that if she gets infections she may need to review the process of cleaning herself to avoid this as straight catheterization can predispose to infection as well as urine retention  Continue IV fluids x24 hours  Continue Breaux catheter while in hospital  BMP a m     2   Infectious disease    Urine has appearance of cystitis as she has been afebrile  Placed on antibiotics await culture results  SUBJECTIVE:  Review of Systems   Constitutional: Negative for chills, diaphoresis, fever and malaise/fatigue  HENT: Negative  Eyes: Negative      Cardiovascular: Negative for chest pain, dyspnea on exertion, leg "swelling and orthopnea  Respiratory: Negative  Negative for cough, shortness of breath, sputum production and wheezing  Gastrointestinal: Negative for abdominal pain, diarrhea, nausea and vomiting  Genitourinary: Breaux catheter in  She said initially the urine was whitish and now it is cleared  Neurological: Negative for dizziness, focal weakness, headaches and light-headedness  Psychiatric/Behavioral: Negative for altered mental status, hallucinations and hypervigilance  The patient is not nervous/anxious  OBJECTIVE:  Current Weight: Weight - Scale: 65 5 kg (144 lb 6 4 oz)  Vitals:Temp (24hrs), Av 2 °F (36 8 °C), Min:97 9 °F (36 6 °C), Max:98 6 °F (37 °C)  Current: Temperature: 97 9 °F (36 6 °C)   Blood pressure 135/79, pulse 73, temperature 97 9 °F (36 6 °C), resp  rate 16, height 5' 2\" (1 575 m), weight 65 5 kg (144 lb 6 4 oz), SpO2 94 %  , Body mass index is 26 41 kg/m²  Intake/Output Summary (Last 24 hours) at 2023 0905  Last data filed at 2023 0527  Gross per 24 hour   Intake --   Output 3045 ml   Net -3045 ml       Physical Exam: /79   Pulse 73   Temp 97 9 °F (36 6 °C)   Resp 16   Ht 5' 2\" (1 575 m)   Wt 65 5 kg (144 lb 6 4 oz)   SpO2 94%   BMI 26 41 kg/m²   Physical Exam  Constitutional:       General: She is not in acute distress  Appearance: She is not toxic-appearing or diaphoretic  HENT:      Head: Normocephalic and atraumatic  Nose: Nose normal       Mouth/Throat:      Mouth: Mucous membranes are moist    Eyes:      General: No scleral icterus  Extraocular Movements: Extraocular movements intact  Cardiovascular:      Rate and Rhythm: Normal rate and regular rhythm  Heart sounds: No friction rub  No gallop  Comments: No significant edema  Pulmonary:      Effort: Pulmonary effort is normal  No respiratory distress  Breath sounds: No wheezing, rhonchi or rales     Abdominal:      General: Bowel sounds are normal  " There is no distension  Palpations: Abdomen is soft  Tenderness: There is no abdominal tenderness  There is no rebound  Musculoskeletal:      Cervical back: Normal range of motion and neck supple  Skin:     Findings: No erythema  Neurological:      General: No focal deficit present  Mental Status: She is alert and oriented to person, place, and time  Mental status is at baseline  Psychiatric:         Mood and Affect: Mood normal          Behavior: Behavior normal          Thought Content:  Thought content normal          Judgment: Judgment normal          Medications:    Current Facility-Administered Medications:   •  acetaminophen (TYLENOL) tablet 975 mg, 975 mg, Oral, Q8H [facility], Emily Toure PA-C, 975 mg at 04/27/23 0750  •  albuterol (PROVENTIL HFA,VENTOLIN HFA) inhaler 2 puff, 2 puff, Inhalation, Q6H PRN, Jessi Toure PA-C  •  aluminum-magnesium hydroxide-simethicone (MYLANTA) oral suspension 30 mL, 30 mL, Oral, Q4H PRN, Jessi Toure PA-C, 30 mL at 04/27/23 0102  •  amLODIPine (NORVASC) tablet 5 mg, 5 mg, Oral, Daily, Emily Toure PA-C, 5 mg at 04/27/23 0750  •  ceftriaxone (ROCEPHIN) 1 g/50 mL in dextrose IVPB, 1,000 mg, Intravenous, Q24H, Ascencion Iniguez MD, Last Rate: 100 mL/hr at 04/27/23 0749, 1,000 mg at 04/27/23 0749  •  DULoxetine (CYMBALTA) delayed release capsule 60 mg, 60 mg, Oral, Daily, Jessi Toure PA-C, 60 mg at 04/27/23 0750  •  gabapentin (NEURONTIN) capsule 300 mg, 300 mg, Oral, HS, Emily Touer PA-C, 300 mg at 04/26/23 2117  •  heparin (porcine) subcutaneous injection 5,000 Units, 5,000 Units, Subcutaneous, Q8H [facility], 5,000 Units at 04/27/23 0525 **AND** Platelet count, , , Once, Jessi Toure PA-C  •  insulin detemir (LEVEMIR) subcutaneous injection 50 Units, 50 Units, Subcutaneous, QPM, Emily Toure PA-C, 50 Units at 04/26/23 1645  •  insulin lispro (HumaLOG) 100 units/mL subcutaneous injection 1-5 Units, 1-5 Units, Subcutaneous, TID AC, 1 Units at 04/27/23 0749 **AND** Fingerstick Glucose (POCT), , , TID AC, Emily Toure PA-C  •  insulin lispro (HumaLOG) 100 units/mL subcutaneous injection 1-5 Units, 1-5 Units, Subcutaneous, HS, Emily Toure PA-C, 1 Units at 04/26/23 0135  •  insulin lispro (HumaLOG) 100 units/mL subcutaneous injection 16 Units, 16 Units, Subcutaneous, Daily With Breakfast, Sharon Toure PA-C, 16 Units at 04/27/23 0749  •  insulin lispro (HumaLOG) 100 units/mL subcutaneous injection 16 Units, 16 Units, Subcutaneous, Daily With Lunch, Sharon Toure PA-C, 16 Units at 04/26/23 1151  •  insulin lispro (HumaLOG) 100 units/mL subcutaneous injection 25 Units, 25 Units, Subcutaneous, Daily With Dinner, Sharon Toure PA-C, 25 Units at 04/26/23 1646  •  metoprolol tartrate (LOPRESSOR) tablet 50 mg, 50 mg, Oral, Q12H [facility], Emily Toure PA-C, 50 mg at 04/27/23 0750  •  ondansetron (ZOFRAN) injection 4 mg, 4 mg, Intravenous, Q6H PRN, Sharon Toure PA-C  •  pramipexole (MIRAPEX) tablet 0 25 mg, 0 25 mg, Oral, HS, Emily Toure PA-C, 0 25 mg at 04/26/23 2117  •  sodium chloride (OCEAN) 0 65 % nasal spray 1 spray, 1 spray, Each Nare, Q1H PRN, Sharon Toure PA-C  •  sodium chloride 0 9 % infusion, 75 mL/hr, Intravenous, Continuous, Emily Toure PA-C, Last Rate: 75 mL/hr at 04/26/23 2131, 75 mL/hr at 04/26/23 2131    Laboratory Results:  Lab Results   Component Value Date    WBC 6 81 04/27/2023    HGB 10 6 (L) 04/27/2023    HCT 32 6 (L) 04/27/2023    MCV 92 04/27/2023     04/27/2023     Lab Results   Component Value Date    SODIUM 136 04/27/2023    K 4 4 04/27/2023     04/27/2023    CO2 25 04/27/2023    BUN 70 (H) 04/27/2023    CREATININE 2 51 (H) 04/27/2023    GLUC 141 (H) 04/27/2023    CALCIUM 8 1 (L) 04/27/2023     Lab Results   Component Value Date    CALCIUM 8 1 (L) 04/27/2023    PHOS 4 8 (H) 04/26/2023     No results found for: LABPROT

## 2023-04-27 NOTE — PLAN OF CARE
Problem: PAIN - ADULT  Goal: Verbalizes/displays adequate comfort level or baseline comfort level  Description: Interventions:  - Encourage patient to monitor pain and request assistance  - Assess pain using appropriate pain scale  - Administer analgesics based on type and severity of pain and evaluate response  - Implement non-pharmacological measures as appropriate and evaluate response  - Consider cultural and social influences on pain and pain management  - Notify physician/advanced practitioner if interventions unsuccessful or patient reports new pain  Outcome: Progressing     Problem: INFECTION - ADULT  Goal: Absence or prevention of progression during hospitalization  Description: INTERVENTIONS:  - Assess and monitor for signs and symptoms of infection  - Monitor lab/diagnostic results  - Monitor all insertion sites, i e  indwelling lines, tubes, and drains  - Monitor endotracheal if appropriate and nasal secretions for changes in amount and color  - [place] appropriate cooling/warming therapies per order  - Administer medications as ordered  - Instruct and encourage patient and family to use good hand hygiene technique  - Identify and instruct in appropriate isolation precautions for identified infection/condition  Outcome: Progressing  Goal: Absence of fever/infection during neutropenic period  Description: INTERVENTIONS:  - Monitor WBC    Outcome: Progressing     Problem: SAFETY ADULT  Goal: Patient will remain free of falls  Description: INTERVENTIONS:  - Educate patient/family on patient safety including physical limitations  - Instruct patient to call for assistance with activity   - Consult OT/PT to assist with strengthening/mobility   - Keep Call bell within reach  - Keep bed low and locked with side rails adjusted as appropriate  - Keep care items and personal belongings within reach  - Initiate and maintain comfort rounds  - Make Fall Risk Sign visible to staff  - Offer Toileting every  Hours, in advance of need  - Initiate/Maintain alarm  - Obtain necessary fall risk management equipment:   - Apply yellow socks and bracelet for high fall risk patients  - Consider moving patient to room near nurses station  Outcome: Progressing  Goal: Maintain or return to baseline ADL function  Description: INTERVENTIONS:  -  Assess patient's ability to carry out ADLs; assess patient's baseline for ADL function and identify physical deficits which impact ability to perform ADLs (bathing, care of mouth/teeth, toileting, grooming, dressing, etc )  - Assess/evaluate cause of self-care deficits   - Assess range of motion  - Assess patient's mobility; develop plan if impaired  - Assess patient's need for assistive devices and provide as appropriate  - Encourage maximum independence but intervene and supervise when necessary  - Involve family in performance of ADLs  - Assess for home care needs following discharge   - Consider OT consult to assist with ADL evaluation and planning for discharge  - Provide patient education as appropriate  Outcome: Progressing  Goal: Maintains/Returns to pre admission functional level  Description: INTERVENTIONS:  - Perform BMAT or MOVE assessment daily    - Set and communicate daily mobility goal to care team and patient/family/caregiver  - Collaborate with rehabilitation services on mobility goals if consulted  - Perform Range of Motion  times a day  - Reposition patient every  hours    - Dangle patient  times a day  - Stand patient  times a day  - Ambulate patient  times a day  - Out of bed to chair  times a day   - Out of bed for meals times a day  - Out of bed for toileting  - Record patient progress and toleration of activity level   Outcome: Progressing     Problem: DISCHARGE PLANNING  Goal: Discharge to home or other facility with appropriate resources  Description: INTERVENTIONS:  - Identify barriers to discharge w/patient and caregiver  - Arrange for needed discharge resources and transportation as appropriate  - Identify discharge learning needs (meds, wound care, etc )  - Arrange for interpretive services to assist at discharge as needed  - Refer to Case Management Department for coordinating discharge planning if the patient needs post-hospital services based on physician/advanced practitioner order or complex needs related to functional status, cognitive ability, or social support system  Outcome: Progressing     Problem: Knowledge Deficit  Goal: Patient/family/caregiver demonstrates understanding of disease process, treatment plan, medications, and discharge instructions  Description: Complete learning assessment and assess knowledge base    Interventions:  - Provide teaching at level of understanding  - Provide teaching via preferred learning methods  Outcome: Progressing

## 2023-04-27 NOTE — ASSESSMENT & PLAN NOTE
Lab Results   Component Value Date    HGBA1C 6 8 (H) 03/07/2023       Recent Labs     04/26/23  2103 04/27/23  0726 04/27/23  1137 04/27/23  1317   POCGLU 125 158* 89 143*       Blood Sugar Average: Last 72 hrs:  · (P) 163 25Hbg a1c <7% per most recent labs   · Continue home insulin regimen: levemir 50U SQ QHS, Novolog 16/16/25U SQ w/ meals  · Add SSI for correction w/ accuchecks

## 2023-04-27 NOTE — ASSESSMENT & PLAN NOTE
· /82  · Admits to hypotension over the last few days  · Increase hold parameter to SBP <130 in setting of ANDRA  · Hold nephrotoxins: olmesartan   Spironolactone  · Continue metoprolol and amlodipine

## 2023-04-27 NOTE — PROGRESS NOTES
[facility]  Progress Note  Name: Madiha Weston I  MRN: [id number]  Unit/Bed#: W -01 I Date of Admission: 4/25/2023   Date of Service: 4/27/2023 I Hospital Day: 2    Assessment/Plan   * Acute renal failure superimposed on stage 4 chronic kidney disease [facility])  Assessment & Plan  Lab Results   Component Value Date    EGFR 18 04/27/2023    EGFR 12 04/26/2023    EGFR 12 04/25/2023    CREATININE 2 51 (H) 04/27/2023    CREATININE 3 54 (H) 04/26/2023    CREATININE 3 50 (H) 04/25/2023   · Patient's Cr rising since starting on Spironolactone on 3/21/23  Patient additionally admits to hypotension over the last few days  · Nephrology consult-- appreciate input-- follows w/ Dr Blair Meyers  · Patient noted to be retaining urine    Catheter was placed  · She also had abnormal UA and started on antibiotics  · Close nephrology follow-up ongoing  · Creatinine improved compared to yesterday  · Diuretics on hold currently  · Discontinue Breaux catheter a m  and monitor    Acute-on-chronic kidney injury [facility])  Assessment & Plan  Lab Results   Component Value Date    EGFR 18 04/27/2023    EGFR 12 04/26/2023    EGFR 12 04/25/2023    CREATININE 2 51 (H) 04/27/2023    CREATININE 3 54 (H) 04/26/2023    CREATININE 3 50 (H) 04/25/2023       Chronic kidney disease  Assessment & Plan  Lab Results   Component Value Date    EGFR 18 04/27/2023    EGFR 12 04/26/2023    EGFR 12 04/25/2023    CREATININE 2 51 (H) 04/27/2023    CREATININE 3 54 (H) 04/26/2023    CREATININE 3 50 (H) 04/25/2023       Toxic metabolic encephalopathy  Assessment & Plan  · Patient reports intermittent confusion over the last few days-- she was aware that she was saying things that did not make sense at the time, but now seems to be alert and oriented x4  · In setting of: ANDRA, ? UTI, hypotension, dehydration  · IVF hydration  · Check UA  · Avoid hypotension  · Supportive care    COPD (chronic obstructive pulmonary disease) ([facility] [address] )  Assessment & Plan  · Patient has had some URI sx over the last few days--  sick w/ similar sx  · No wheezing on physical exam  · Nebs PRN  · Hold on steroids for now    Anemia in stage 4 chronic kidney disease ([facility] [address] )  Assessment & Plan  · Hemoglobin remained stable  · Continue to monitor    Primary hypertension  Assessment & Plan  · /82  · Admits to hypotension over the last few days  · Increase hold parameter to SBP <130 in setting of ANDRA  · Hold nephrotoxins: olmesartan  Spironolactone  · Continue metoprolol and amlodipine    Type 2 diabetes mellitus with diabetic nephropathy, with long-term current use of insulin [facility])  Assessment & Plan  Lab Results   Component Value Date    HGBA1C 6 8 (H) 03/07/2023       Recent Labs     04/26/23  2103 04/27/23  0726 04/27/23  1137 04/27/23  1317   POCGLU 125 158* 89 143*       Blood Sugar Average: Last 72 hrs:  · (P) 163 25Hbg a1c <7% per most recent labs   · Continue home insulin regimen: levemir 50U SQ QHS, Novolog 16/16/25U SQ w/ meals  · Add SSI for correction w/ accuchecks      VTE Pharmacologic Prophylaxis: VTE Score: 3 Moderate Risk (Score 3-4) - Pharmacological DVT Prophylaxis Ordered: heparin  Patient Centered Rounds: I performed bedside rounds with nursing staff today  Discussions with Specialists or Other Care Team Provider:     Education and Discussions with Family / Patient: Updated  () at bedside  Total Time Spent on Date of Encounter in care of patient: 25 minutes This time was spent on one or more of the following: performing physical exam; counseling and coordination of care; obtaining or reviewing history; documenting in the medical record; reviewing/ordering tests, medications or procedures; communicating with other healthcare professionals and discussing with patient's family/caregivers      Current Length of Stay: 2 day(s)  Current Patient Status: Inpatient   Certification Statement: The patient will continue to require additional inpatient hospital stay due to Acute kidney injury and UTI  Discharge Plan: Anticipate discharge tomorrow to home  Code Status: Level 3 - DNAR and DNI    Subjective:     Patient resting in bed     She feels better today  Not having any fever or chills  Indwelling urinary catheter in place  Objective:     Vitals:   Temp (24hrs), Av 2 °F (36 8 °C), Min:97 9 °F (36 6 °C), Max:98 6 °F (37 °C)    Temp:  [97 9 °F (36 6 °C)-98 6 °F (37 °C)] 97 9 °F (36 6 °C)  HR:  [71-75] 73  Resp:  [16-17] 16  BP: (119-135)/(66-79) 135/79  SpO2:  [91 %-94 %] 94 %  Body mass index is 26 41 kg/m²  Input and Output Summary (last 24 hours): Intake/Output Summary (Last 24 hours) at 2023 1416  Last data filed at 2023 0527  Gross per 24 hour   Intake --   Output 2750 ml   Net -2750 ml       Physical Exam:     Gen -Patient comfortable at rest  Neck- Supple  No thyromegaly or lymphadenopathy  Lungs-Clear bilaterally without any wheeze or rales   Heart S1-S2, regular rate and rhythm, no murmurs  Abdomen-soft nontender, no organomegaly   Bowel sounds present  Extremities-no cyanosi,  clubbing or edema  Skin- no rash  Neuro-awake alert and oriented         Additional Data:     Labs:  Results from last 7 days   Lab Units 23  0436   WBC Thousand/uL 6 81   HEMOGLOBIN g/dL 10 6*   HEMATOCRIT % 32 6*   PLATELETS Thousands/uL 193   NEUTROS PCT % 50   LYMPHS PCT % 34   MONOS PCT % 9   EOS PCT % 6     Results from last 7 days   Lab Units 23  0436 23  0458 23   SODIUM mmol/L 136   < > 129*   POTASSIUM mmol/L 4 4   < > 5 4*   CHLORIDE mmol/L 105   < > 93*   CO2 mmol/L 25   < > 25   BUN mg/dL 70*   < > 88*   CREATININE mg/dL 2 51*   < > 3 50*   ANION GAP mmol/L 6   < > 11   CALCIUM mg/dL 8 1*   < > 10 0   ALBUMIN g/dL  --   --  4 2   TOTAL BILIRUBIN mg/dL  --   --  0 45   ALK PHOS U/L  --   --  71   ALT U/L  --   --  16   AST U/L  --   --  23   GLUCOSE RANDOM mg/dL 141*   < > 177*    < > = values in this interval not displayed           Results from last 7 days   Lab Units 04/27/23  1317 04/27/23  1137 04/27/23  0726 04/26/23  2103 04/26/23  1635 04/26/23  1103 04/26/23  0721 04/26/23  0120   POC GLUCOSE mg/dl 143* 89 158* 125 164* 232* 190* 205*               Lines/Drains:  Invasive Devices     Peripheral Intravenous Line  Duration           Peripheral IV 04/25/23 Right;Ventral (anterior) Forearm 1 day          Drain  Duration           Urethral Catheter Asept 16 Fr  1 day              Urinary Catheter:  Goal for removal: N/A - Chronic Breaux               Imaging:     Recent Cultures (last 7 days):   Results from last 7 days   Lab Units 04/26/23  0736   URINE CULTURE  >100,000 cfu/ml Gram Negative Jack Enteric Like*       Last 24 Hours Medication List:   Current Facility-Administered Medications   Medication Dose Route Frequency Provider Last Rate   • acetaminophen  975 mg Oral Q8H [facility] Emily Toure PA-C     • albuterol  2 puff Inhalation Q6H PRN Flaco Toure PA-C     • aluminum-magnesium hydroxide-simethicone  30 mL Oral Q4H PRN Emily Toure PA-C     • amLODIPine  5 mg Oral Daily Emily Toure PA-C     • cefTRIAXone  1,000 mg Intravenous Q24H Ascencion Iniguez MD 1,000 mg (04/27/23 0749)   • DULoxetine  60 mg Oral Daily Emily Toure PA-C     • gabapentin  300 mg Oral HS Emily Toure PA-C     • heparin (porcine)  5,000 Units Subcutaneous Q8H [id number] [place] [address] Dr Mesfin PA-C     • insulin detemir  50 Units Subcutaneous QPM Emily Toure PA-C     • insulin lispro  1-5 Units Subcutaneous TID AC Emily Toure PA-C     • insulin lispro  1-5 Units Subcutaneous HS Emily Toure PA-C     • insulin lispro  16 Units Subcutaneous Daily With Breakfast Emily Toure PA-C     • insulin lispro  16 Units Subcutaneous Daily With Lunch Emily Toure PA-C     • insulin lispro  25 Units Subcutaneous Daily With [employer] Manolo Toure PA-C     • metoprolol tartrate  50 mg Oral Q12H [address] [facility] [address] Mesfin PA-C     • ondansetron  4 mg Intravenous Q6H PRN Manolo Toure PA-C     • pramipexole  0 25 mg Oral HS Emily Alexandria Toure PA-C     • sodium chloride  1 spray Each Nare Q1H PRN Manolo Toure PA-C     • sodium chloride  75 mL/hr Intravenous Continuous Manolo Toure PA-C 75 mL/hr (04/27/23 1054)        Today, Patient Was Seen By: Keshia Donovan MD    **Please Note: This note may have been constructed using a voice recognition system  **

## 2023-04-27 NOTE — ASSESSMENT & PLAN NOTE
Lab Results   Component Value Date    EGFR 18 04/27/2023    EGFR 12 04/26/2023    EGFR 12 04/25/2023    CREATININE 2 51 (H) 04/27/2023    CREATININE 3 54 (H) 04/26/2023    CREATININE 3 50 (H) 04/25/2023   · Patient's Cr rising since starting on Spironolactone on 3/21/23  Patient additionally admits to hypotension over the last few days  · Nephrology consult-- appreciate input-- follows w/ Dr Orin Elliott  · Patient noted to be retaining urine    Catheter was placed  · She also had abnormal UA and started on antibiotics  · Close nephrology follow-up ongoing  · Creatinine improved compared to yesterday  · Diuretics on hold currently  · Discontinue Breaux catheter a m  and monitor

## 2023-04-27 NOTE — ASSESSMENT & PLAN NOTE
Lab Results   Component Value Date    EGFR 18 04/27/2023    EGFR 12 04/26/2023    EGFR 12 04/25/2023    CREATININE 2 51 (H) 04/27/2023    CREATININE 3 54 (H) 04/26/2023    CREATININE 3 50 (H) 04/25/2023

## 2023-04-28 ENCOUNTER — TRANSITIONAL CARE MANAGEMENT (OUTPATIENT)
Dept: FAMILY MEDICINE CLINIC | Facility: CLINIC | Age: 73
End: 2023-04-28

## 2023-04-28 VITALS
DIASTOLIC BLOOD PRESSURE: 79 MMHG | SYSTOLIC BLOOD PRESSURE: 158 MMHG | HEIGHT: 62 IN | HEART RATE: 70 BPM | BODY MASS INDEX: 26.57 KG/M2 | TEMPERATURE: 97.6 F | WEIGHT: 144.4 LBS | RESPIRATION RATE: 19 BRPM | OXYGEN SATURATION: 97 %

## 2023-04-28 PROBLEM — N30.00 ACUTE CYSTITIS WITHOUT HEMATURIA: Status: ACTIVE | Noted: 2023-04-28

## 2023-04-28 PROBLEM — G92.8 TOXIC METABOLIC ENCEPHALOPATHY: Status: RESOLVED | Noted: 2023-01-07 | Resolved: 2023-04-28

## 2023-04-28 LAB
ANION GAP SERPL CALCULATED.3IONS-SCNC: 7 MMOL/L (ref 4–13)
BUN SERPL-MCNC: 52 MG/DL (ref 5–25)
CALCIUM SERPL-MCNC: 8.4 MG/DL (ref 8.4–10.2)
CHLORIDE SERPL-SCNC: 108 MMOL/L (ref 96–108)
CO2 SERPL-SCNC: 23 MMOL/L (ref 21–32)
CREAT SERPL-MCNC: 1.9 MG/DL (ref 0.6–1.3)
GFR SERPL CREATININE-BSD FRML MDRD: 25 ML/MIN/1.73SQ M
GLUCOSE SERPL-MCNC: 110 MG/DL (ref 65–140)
GLUCOSE SERPL-MCNC: 143 MG/DL (ref 65–140)
GLUCOSE SERPL-MCNC: 195 MG/DL (ref 65–140)
POTASSIUM SERPL-SCNC: 4.6 MMOL/L (ref 3.5–5.3)
SODIUM SERPL-SCNC: 138 MMOL/L (ref 135–147)

## 2023-04-28 RX ORDER — CEPHALEXIN 500 MG/1
500 CAPSULE ORAL EVERY 8 HOURS SCHEDULED
Qty: 15 CAPSULE | Refills: 0 | Status: SHIPPED | OUTPATIENT
Start: 2023-04-28 | End: 2023-05-03

## 2023-04-28 RX ADMIN — INSULIN LISPRO 16 UNITS: 100 INJECTION, SOLUTION INTRAVENOUS; SUBCUTANEOUS at 09:15

## 2023-04-28 RX ADMIN — ACETAMINOPHEN 975 MG: 325 TABLET ORAL at 01:02

## 2023-04-28 RX ADMIN — METOPROLOL TARTRATE 50 MG: 50 TABLET, FILM COATED ORAL at 09:13

## 2023-04-28 RX ADMIN — HEPARIN SODIUM 5000 UNITS: 5000 INJECTION INTRAVENOUS; SUBCUTANEOUS at 05:51

## 2023-04-28 RX ADMIN — DULOXETINE HYDROCHLORIDE 60 MG: 60 CAPSULE, DELAYED RELEASE ORAL at 09:13

## 2023-04-28 RX ADMIN — CEFTRIAXONE SODIUM 1000 MG: 10 INJECTION, POWDER, FOR SOLUTION INTRAVENOUS at 09:13

## 2023-04-28 RX ADMIN — AMLODIPINE BESYLATE 5 MG: 5 TABLET ORAL at 09:13

## 2023-04-28 NOTE — PROGRESS NOTES
NEPHROLOGY PROGRESS NOTE    Imelda Schwab 75 y o  female MRN: [id number]  Unit/Bed#: W -01 Encounter: [id number]  Reason for Consult: Acute on chronic kidney disease    The patient is awake and alert feeling well Breaux catheters been removed she says that she is making sure she empties her bladder  No other symptoms or complaints for me is hoping to be able to go home today  ASSESSMENT/PLAN:  1  Renal    The patient is acute on chronic kidney disease baseline creatinine 1 5-2 follows with a nephrologist has diabetic nephropathy  She also has a neurogenic bladder and has had some recurrent urinary tract infections  Breaux catheter was removed she does urinate and does intermittent straight catheterization at home  She will also follow with urology and she has an appointment in the near future I will ask them just to review her technique  Renal function has improved creatinine is down to baseline range normal electrolytes  Stable for discharge from renal standpoint  Follow-up with her primary nephrologist  Follow-up with urology    2  Infectious disease    Patient with urinary tract infection and culture positive for E  coli which is susceptible to antibiotics  No fever  To be discharged to complete oral course per primary service  I will communicate with the primary service tell him that it is okay to discharge the patient if all other issues are stable  SUBJECTIVE:  Review of Systems   Constitutional: Negative for chills, diaphoresis, fever and malaise/fatigue  HENT: Negative  Eyes: Negative  Cardiovascular: Negative for chest pain, dyspnea on exertion, leg swelling and orthopnea  Respiratory: Negative  Negative for cough, shortness of breath, sputum production and wheezing  Gastrointestinal: Negative for abdominal pain, diarrhea, nausea and vomiting  Genitourinary: Negative for dysuria, flank pain, hematuria and incomplete emptying          Catheter removed and she is "urinating independently  Neurological: Negative for dizziness, focal weakness, headaches and weakness  Psychiatric/Behavioral: Negative for altered mental status, hallucinations and hypervigilance  The patient is not nervous/anxious  OBJECTIVE:  Current Weight: Weight - Scale: 65 5 kg (144 lb 6 4 oz)  Vitals:Temp (24hrs), Av 7 °F (36 5 °C), Min:97 6 °F (36 4 °C), Max:97 7 °F (36 5 °C)  Current: Temperature: 97 6 °F (36 4 °C)   Blood pressure 158/79, pulse 70, temperature 97 6 °F (36 4 °C), resp  rate 19, height 5' 2\" (1 575 m), weight 65 5 kg (144 lb 6 4 oz), SpO2 97 %  , Body mass index is 26 41 kg/m²  Intake/Output Summary (Last 24 hours) at 2023 0923  Last data filed at 2023 4215  Gross per 24 hour   Intake 3936 25 ml   Output 3975 ml   Net -38 75 ml       Physical Exam: /79   Pulse 70   Temp 97 6 °F (36 4 °C)   Resp 19   Ht 5' 2\" (1 575 m)   Wt 65 5 kg (144 lb 6 4 oz)   SpO2 97%   BMI 26 41 kg/m²   Physical Exam  Constitutional:       General: She is not in acute distress  Appearance: She is not ill-appearing or toxic-appearing  HENT:      Head: Normocephalic and atraumatic  Nose: Nose normal       Mouth/Throat:      Mouth: Mucous membranes are moist    Eyes:      General: No scleral icterus  Extraocular Movements: Extraocular movements intact  Cardiovascular:      Rate and Rhythm: Normal rate and regular rhythm  Heart sounds: No friction rub  No gallop  Pulmonary:      Effort: Pulmonary effort is normal  No respiratory distress  Breath sounds: No wheezing, rhonchi or rales  Abdominal:      General: Bowel sounds are normal  There is no distension  Palpations: Abdomen is soft  Tenderness: There is no abdominal tenderness  There is no rebound  Musculoskeletal:      Cervical back: Normal range of motion and neck supple  Neurological:      General: No focal deficit present        Mental Status: She is alert and oriented to " person, place, and time  Mental status is at baseline  Psychiatric:         Mood and Affect: Mood normal          Behavior: Behavior normal          Thought Content:  Thought content normal          Judgment: Judgment normal          Medications:    Current Facility-Administered Medications:   •  acetaminophen (TYLENOL) tablet 975 mg, 975 mg, Oral, Q8H [facility], Emily Toure PA-C, 975 mg at 04/28/23 0102  •  albuterol (PROVENTIL HFA,VENTOLIN HFA) inhaler 2 puff, 2 puff, Inhalation, Q6H PRN, Mary Jo Toure PA-C, 2 puff at 04/27/23 1853  •  aluminum-magnesium hydroxide-simethicone (MYLANTA) oral suspension 30 mL, 30 mL, Oral, Q4H PRN, Mary Jo Toure PA-C, 30 mL at 04/27/23 0102  •  amLODIPine (NORVASC) tablet 5 mg, 5 mg, Oral, Daily, Mary Jo Toure PA-C, 5 mg at 04/28/23 0913  •  ceftriaxone (ROCEPHIN) 1 g/50 mL in dextrose IVPB, 1,000 mg, Intravenous, Q24H, Ascencion Iniguez MD, Last Rate: 100 mL/hr at 04/28/23 0913, 1,000 mg at 04/28/23 0913  •  DULoxetine (CYMBALTA) delayed release capsule 60 mg, 60 mg, Oral, Daily, Mary Jo Toure PA-C, 60 mg at 04/28/23 5536  •  gabapentin (NEURONTIN) capsule 300 mg, 300 mg, Oral, HS, Emily Toure PA-C, 300 mg at 04/27/23 2111  •  heparin (porcine) subcutaneous injection 5,000 Units, 5,000 Units, Subcutaneous, Q8H [facility], 5,000 Units at 04/28/23 0551 **AND** Platelet count, , , Once, Mary Jo Toure PA-C  •  insulin detemir (LEVEMIR) subcutaneous injection 50 Units, 50 Units, Subcutaneous, QPM, Emily Toure PA-C, 50 Units at 04/27/23 1704  •  insulin lispro (HumaLOG) 100 units/mL subcutaneous injection 1-5 Units, 1-5 Units, Subcutaneous, TID AC, 1 Units at 04/27/23 0749 **AND** Fingerstick Glucose (POCT), , , TID AC, Emily Toure PA-C  •  insulin lispro (HumaLOG) 100 units/mL subcutaneous injection 1-5 Units, 1-5 Units, Subcutaneous, HS, Emily Toure PA-C, 2 Units at 04/27/23 2116  • insulin lispro (HumaLOG) 100 units/mL subcutaneous injection 16 Units, 16 Units, Subcutaneous, Daily With Breakfast, Abbe Toure PA-C, 16 Units at 04/28/23 0915  •  insulin lispro (HumaLOG) 100 units/mL subcutaneous injection 16 Units, 16 Units, Subcutaneous, Daily With Lunch, Abbe Toure PA-C, 16 Units at 04/26/23 1151  •  insulin lispro (HumaLOG) 100 units/mL subcutaneous injection 25 Units, 25 Units, Subcutaneous, Daily With Dinner, Abbe Toure PA-C, 25 Units at 04/26/23 1646  •  metoprolol tartrate (LOPRESSOR) tablet 50 mg, 50 mg, Oral, Q12H [address], Emily Toure PA-C, 50 mg at 04/28/23 0913  •  ondansetron (ZOFRAN) injection 4 mg, 4 mg, Intravenous, Q6H PRN, Abbe Toure PA-C  •  pramipexole (MIRAPEX) tablet 0 25 mg, 0 25 mg, Oral, HS, Emily Toure PA-C, 0 25 mg at 04/27/23 2111  •  sodium chloride (OCEAN) 0 65 % nasal spray 1 spray, 1 spray, Each Nare, Q1H PRN, Abbe Toure PA-C  •  sodium chloride 0 9 % infusion, 75 mL/hr, Intravenous, Continuous, Emily Toure PA-C, Last Rate: 75 mL/hr at 04/28/23 0533, 75 mL/hr at 04/28/23 0533    Laboratory Results:  Lab Results   Component Value Date    WBC 6 81 04/27/2023    HGB 10 6 (L) 04/27/2023    HCT 32 6 (L) 04/27/2023    MCV 92 04/27/2023     04/27/2023     Lab Results   Component Value Date    SODIUM 138 04/28/2023    K 4 6 04/28/2023     04/28/2023    CO2 23 04/28/2023    BUN 52 (H) 04/28/2023    CREATININE 1 90 (H) 04/28/2023    GLUC 110 04/28/2023    CALCIUM 8 4 04/28/2023     Lab Results   Component Value Date    CALCIUM 8 4 04/28/2023    PHOS 4 8 (H) 04/26/2023     No results found for: LABPROT

## 2023-04-28 NOTE — DISCHARGE SUMMARY
[facility]  Discharge- Greta Ruiz 1950, 75 y o  female MRN: [id number]  Unit/Bed#: W -01 Encounter: [id number]  Primary Care Provider: Clau Pittman MD   Date and time admitted to hospital: 4/25/2023  7:46 PM    * Acute renal failure superimposed on stage 4 chronic kidney disease [facility])  Assessment & Plan  Lab Results   Component Value Date    EGFR 25 04/28/2023    EGFR 18 04/27/2023    EGFR 12 04/26/2023    CREATININE 1 90 (H) 04/28/2023    CREATININE 2 51 (H) 04/27/2023    CREATININE 3 54 (H) 04/26/2023   · Patient's Cr rising since starting on Spironolactone on 3/21/23  Patient additionally admits to hypotension over the last few days  · Nephrology consult-- appreciate input-- follows w/ Dr Deanne Crook  · Patient noted to be retaining urine  Catheter was placed  · She also had abnormal UA and started on antibiotics  · Creatinine returned to baseline  · Breaux catheter has been removed and patient able to void  · She self catheterizes at home  · Continue to hold diuretics and ARB on discharge  Follow with nephrology before resuming    Acute cystitis without hematuria  Assessment & Plan  · Likely related to self-catheterization  · Urine cultures growing E  coli pansensitive  · We will discharge with p o   Keflex  · Follow-up with urology    Hyponatremia  Assessment & Plan  · Na 129  · In setting of ANDRA, spironolactone use  · Resolved with hydration   · Continue to monitor in the outpatient setting  · Nephrology consulted--- appreciate input    COPD (chronic obstructive pulmonary disease) ([facility] )  Assessment & Plan  · Patient has had some URI sx over the last few days--  sick w/ similar sx  · No wheezing on physical exam  · Nebs PRN  · Hold on steroids for now    Anemia in stage 4 chronic kidney disease ([facility] )  Assessment & Plan  · Hemoglobin remained stable  · Continue to monitor    Primary hypertension  Assessment & Plan  · /82  · Admits to hypotension over the last few days  · Increase hold parameter to SBP <130 in setting of ANDRA  · Hold nephrotoxins: olmesartan  Spironolactone  · Continue metoprolol and amlodipine    Type 2 diabetes mellitus with diabetic nephropathy, with long-term current use of insulin [facility])  Assessment & Plan  Lab Results   Component Value Date    HGBA1C 6 8 (H) 03/07/2023       Recent Labs     04/27/23  1608 04/27/23  2115 04/28/23  0741 04/28/23  1054   POCGLU 86 241* 143* 195*       Blood Sugar Average: Last 72 hrs:  · (P) 164  25Hbg a1c <7% per most recent labs   · Continue home insulin regimen: levemir 50U SQ QHS, Novolog 16/16/25U SQ w/ meals  · Add SSI for correction w/ accuchecks    Toxic metabolic encephalopathy-resolved as of 4/28/2023  Assessment & Plan  · Patient reports intermittent confusion over the last few days-- she was aware that she was saying things that did not make sense at the time, but now seems to be alert and oriented x4  · In setting of: ANDRA, ? UTI, hypotension, dehydration  · IVF hydration  · Check UA  · Avoid hypotension  · Supportive care    Medical Problems     Resolved Problems  Date Reviewed: 4/28/2023          Resolved    Toxic metabolic encephalopathy 6/60/2667     Resolved by  Travon Flynn MD        Discharging Physician / Practitioner: Travon Flynn MD  PCP: Carlos Johnson MD  Admission Date:   Admission Orders (From admission, onward)     Ordered        04/25/23 2250  [address] [place] [address],[address]  [facility]                      Discharge Date: 04/28/23    Consultations During Hospital Stay:  · Nephrology    Procedures Performed:   · Chest x-ray without any significant changes      Hospital Course:   Yuan Erwin is a 75 y o  female patient who originally presented to the hospital on 4/25/2023 due to confusion  Upon evaluation emergency department she was noted to have elevated creatinine and abnormal UA consistent with UTI  Patient was started on IV antibiotics and IV fluids for hydration    Her creatinine returned "to baseline  UTI was treated with ceftriaxone while inpatient and being changed to p o  Keflex on discharge  She was also seen by nephrology  Her diuretics and ARB was discontinued  Can be restarted once seen by nephrology in the outpatient setting  Patient remained stable and being discharged home    Please see above list of diagnoses and related plan for additional information  Condition at Discharge: good    Discharge Day Visit / Exam:   Subjective: She feels well today and anxious to go home  Vitals: Blood Pressure: 158/79 (04/28/23 0742)  Pulse: 70 (04/28/23 0742)  Temperature: 97 6 °F (36 4 °C) (04/28/23 0742)  Temp Source: Oral (04/26/23 2105)  Respirations: 19 (04/28/23 0742)  Height: 5' 2\" (157 5 cm) (04/26/23 0014)  Weight - Scale: 65 5 kg (144 lb 6 4 oz) (04/26/23 0014)  SpO2: 97 % (04/28/23 0742)  Exam:   Gen -Patient comfortable   Neck- Supple  No thyromegaly or lymphadenopathy  Lungs-Clear bilaterally without any wheeze or rales   Heart S1-S2, regular rate and rhythm, no murmurs  Abdomen-soft nontender, no organomegaly  Bowel sounds present  Extremities-no cyanosi,  clubbing or edema  Skin- no rash  Neuro-nonfocal         Discussion with Family: Updated  () at bedside  Discharge instructions/Information to patient and family:   See after visit summary for information provided to patient and family  Provisions for Follow-Up Care:  See after visit summary for information related to follow-up care and any pertinent home health orders  Disposition:   Home    Planned Readmission:      Discharge Statement:  I spent 35 minutes discharging the patient  This time was spent on the day of discharge  I had direct contact with the patient on the day of discharge  Greater than 50% of the total time was spent examining patient, answering all patient questions, arranging and discussing plan of care with patient as well as directly providing post-discharge instructions    " Additional time then spent on discharge activities  Discharge Medications:  See after visit summary for reconciled discharge medications provided to patient and/or family        **Please Note: This note may have been constructed using a voice recognition system**

## 2023-04-28 NOTE — ASSESSMENT & PLAN NOTE
· Likely related to self-catheterization  · Urine cultures growing E  coli pansensitive  · We will discharge with p o   Keflex  · Follow-up with urology

## 2023-04-28 NOTE — CASE MANAGEMENT
Case Management Assessment & Discharge Planning Note    Patient name Gabbie Santiago  Location W /W -01 MRN [id number]  : 1950 Date 2023       Current Admission Date: 2023  Current Admission Diagnosis:Acute renal failure superimposed on stage 4 chronic kidney disease [facility])   Patient Active Problem List    Diagnosis Date Noted   • Acute-on-chronic kidney injury ([place] [address] )    • Hyponatremia 2023   • Chronic kidney disease    • Elevated liver enzymes 2023   • Seasonal allergies 2023   • Acute on chronic respiratory failure with hypoxemia ([place] [address] ) 2023   • Acute renal failure superimposed on stage 4 chronic kidney disease ([place] [address] ) 2023   • Fall 2023   • Fracture of orbital floor, blow-out, right, closed, with routine healing, subsequent encounter 2023   • Nasal bones, closed fracture 2023   • Facial abrasion, initial encounter 2023   • Multiple closed fractures of ribs of right side 2023   • Liver function abnormality 2023   • Toxic metabolic encephalopathy    • 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 2023   • Headache 2023   • Hypothyroid 2023   • Urinary incontinence 2022   • S/P foot surgery 2022   • UTI (urinary tract infection) 2022   • Hypotension 2022   • Urinary retention 2022   • Abnormal thyroid blood test 2022   • Abdominal pain 2022   • Diabetic nephropathy associated with type 2 diabetes mellitus ([place] [address] ) 2022   • Other chest pain 2021   • COVID-19 virus infection 2021   • Chronic constipation 2021   • History of colon polyps 2021   • BMI 29 0-29 9,adult 2021   • Epigastric abdominal pain with severe diabetic gastroparesis, status post EGD/Botox injection, 2021   • COPD (chronic obstructive pulmonary disease) ([place] [address] ) 2021   • Anemia in stage 4 chronic kidney disease ([facility] ) 2021   • B12 neuropathy ([place] [address] ) 04/01/2021   • Depression, recurrent ([place] [address] ) 04/01/2021   • Chronic respiratory failure with hypoxia ([place] [address] ) 04/01/2021   • Gastroparesis diabeticorum ([facility]  ) 03/08/2021   • Ambulatory dysfunction 10/22/2020   • Memory changes 10/22/2020   • History of kidney stones 09/29/2020   • Urinary urgency 09/29/2020   • Primary hypertension 09/23/2020   • Type 2 diabetes mellitus with diabetic chronic kidney disease ([place] [address] ) 07/14/2020   • Leukocytosis 07/14/2020   • Fibromyalgia 05/27/2020   • ANDRA (acute kidney injury) ([place] [address] ) 05/08/2020   • Dyslipidemia 05/08/2020   • Vitamin D deficiency 05/08/2020   • Anemia of chronic renal failure, stage 3 (moderate) ([place] [address] ) 05/08/2020   • History of repair of hiatal hernia 05/03/2020   • Acute respiratory failure with hypoxia ([place] [address] ) 05/02/2020   • Dyspnea    • Pulmonary hypertension ([place] [address] ) 07/31/2019   • Type 2 diabetes mellitus with diabetic nephropathy, with long-term current use of insulin ([place] [address] ) 06/27/2019   • Persistent proteinuria 06/25/2019   • BARBARA (obstructive sleep apnea) 04/09/2019   • RLS (restless legs syndrome) 04/09/2019   • Stage 4 chronic kidney disease ([place] [address] ) 09/19/2018   • Hypertensive chronic kidney disease with stage 1 through stage 4 chronic kidney disease, or unspecified chronic kidney disease 09/19/2018      LOS (days): 3  Geometric Mean LOS (GMLOS) (days): 4 30  Days to GMLOS:1 8     OBJECTIVE:  PATIENT READMITTED TO HOSPITAL  Risk of Unplanned Readmission Score: 35 2         Current admission status: Inpatient       Preferred Pharmacy:   [facility] [address] [place] [address]  Phone: [phone] Fax: [phone]    Primary Care Provider: Mckenna Mao MD    Primary Insurance: MEDICARE  Secondary Insurance: COMMERCIAL MISCELLANEOUS    ASSESSMENT:  [facility] Representative - Spouse   Primary Phone: [phone] (Mobile)  Home Phone: [phone] Readmission [employer]  30 Day Readmission: Yes  Who directed you to return to the hospital?: Family  Did you understand whom to contact if you had questions or problems?: Yes  Did you get your prescriptions before you left the hospital?: Yes  Were you able to get your prescriptions filled when you left the hospital?: Yes  Did you take your medications as prescribed?: Yes  Were you able to get to your follow-up appointments?: Yes  During previous admission, was a post-acute recommendation made?: No  Patient was readmitted due to: COPD  Action Plan: ABX, Neprhology consult    Patient Information  Admitted from[de-identified] Home  Mental Status: Alert  During Assessment patient was accompanied by: Spouse  Assessment information provided by[de-identified] Patient  Primary Caregiver: Self  Support Systems: Son, Self, Spouse/significant other  [place] of Residence: [address] [place] [address] do you live in?: [address] of Transport to [facility][de-identified] Family transport        DISCHARGE DETAILS:    Discharge planning discussed with[de-identified] Pt and spouse  Freedom of Choice: Yes  Comments - Freedom of Choice: Pt and spouse decline any CM needs at this time  CM contacted family/caregiver?: Yes  Were Treatment Team discharge recommendations reviewed with patient/caregiver?: Yes          Contacts  Patient Contacts: Maureen Tatum (spouse)  Relationship to Patient[de-identified] Family  Contact Method: In Person  Reason/Outcome: Discharge Planning              Other Referral/Resources/Interventions Provided:  Referral Comments: CM saw pt and spouse bedside  Pt reports her spourse will take her home  IMM Given (Date):: 04/28/23  IMM Given to[de-identified] Patient     Additional Comments: NATALY reviewed the IMM with the pt and spouse bedside  Pt agrees with discharge plan and has no questions or concerns at this time

## 2023-04-28 NOTE — ASSESSMENT & PLAN NOTE
Lab Results   Component Value Date    EGFR 25 04/28/2023    EGFR 18 04/27/2023    EGFR 12 04/26/2023    CREATININE 1 90 (H) 04/28/2023    CREATININE 2 51 (H) 04/27/2023    CREATININE 3 54 (H) 04/26/2023   · Patient's Cr rising since starting on Spironolactone on 3/21/23  Patient additionally admits to hypotension over the last few days  · Nephrology consult-- appreciate input-- follows w/ Dr Mackenzie Lima  · Patient noted to be retaining urine  Catheter was placed  · She also had abnormal UA and started on antibiotics  · Creatinine returned to baseline  · Breaux catheter has been removed and patient able to void  · She self catheterizes at home  · Continue to hold diuretics and ARB on discharge    Follow with nephrology before resuming

## 2023-04-28 NOTE — PLAN OF CARE
Problem: PAIN - ADULT  Goal: Verbalizes/displays adequate comfort level or baseline comfort level  Description: Interventions:  - Encourage patient to monitor pain and request assistance  - Assess pain using appropriate pain scale  - Administer analgesics based on type and severity of pain and evaluate response  - Implement non-pharmacological measures as appropriate and evaluate response  - Consider cultural and social influences on pain and pain management  - Notify physician/advanced practitioner if interventions unsuccessful or patient reports new pain  4/28/2023 1225 by Lucretia Mora RN  Outcome: Completed  4/28/2023 1042 by Lucretia Mora RN  Outcome: Progressing     Problem: INFECTION - ADULT  Goal: Absence or prevention of progression during hospitalization  Description: INTERVENTIONS:  - Assess and monitor for signs and symptoms of infection  - Monitor lab/diagnostic results  - Monitor all insertion sites, i e  indwelling lines, tubes, and drains  - Monitor endotracheal if appropriate and nasal secretions for changes in amount and color  - [place] appropriate cooling/warming therapies per order  - Administer medications as ordered  - Instruct and encourage patient and family to use good hand hygiene technique  - Identify and instruct in appropriate isolation precautions for identified infection/condition  4/28/2023 1225 by Lucretia Mora RN  Outcome: Completed  4/28/2023 1042 by Lucretia Mora RN  Outcome: Progressing  Goal: Absence of fever/infection during neutropenic period  Description: INTERVENTIONS:  - Monitor WBC    4/28/2023 1225 by Lucretia Mora RN  Outcome: Completed  4/28/2023 1042 by Lucretia Mora RN  Outcome: Progressing     Problem: SAFETY ADULT  Goal: Patient will remain free of falls  Description: INTERVENTIONS:  - Educate patient/family on patient safety including physical limitations  - Instruct patient to call for assistance with activity   - Consult OT/PT to assist with strengthening/mobility   - Keep Call bell within reach  - Keep bed low and locked with side rails adjusted as appropriate  - Keep care items and personal belongings within reach  - Initiate and maintain comfort rounds  - Make Fall Risk Sign visible to staff  - Offer Toileting every  Hours, in advance of need  - Initiate/Maintain alarm  - Obtain necessary fall risk management equipment:   - Apply yellow socks and bracelet for high fall risk patients  - Consider moving patient to room near nurses station  4/28/2023 1225 by Cris Au RN  Outcome: Completed  4/28/2023 1042 by Cris Au RN  Outcome: Progressing  Goal: Maintain or return to baseline ADL function  Description: INTERVENTIONS:  -  Assess patient's ability to carry out ADLs; assess patient's baseline for ADL function and identify physical deficits which impact ability to perform ADLs (bathing, care of mouth/teeth, toileting, grooming, dressing, etc )  - Assess/evaluate cause of self-care deficits   - Assess range of motion  - Assess patient's mobility; develop plan if impaired  - Assess patient's need for assistive devices and provide as appropriate  - Encourage maximum independence but intervene and supervise when necessary  - Involve family in performance of ADLs  - Assess for home care needs following discharge   - Consider OT consult to assist with ADL evaluation and planning for discharge  - Provide patient education as appropriate  4/28/2023 1225 by Cris Au RN  Outcome: Completed  4/28/2023 1042 by Cris Au RN  Outcome: Progressing  Goal: Maintains/Returns to pre admission functional level  Description: INTERVENTIONS:  - Perform BMAT or MOVE assessment daily    - Set and communicate daily mobility goal to care team and patient/family/caregiver  - Collaborate with rehabilitation services on mobility goals if consulted  - Perform Range of Motion  times a day  - Reposition patient every  hours    - Dangle patient times a day  - Stand patient  times a day  - Ambulate patient  times a day  - Out of bed to chair  times a day   - Out of bed for meals  times a day  - Out of bed for toileting  - Record patient progress and toleration of activity level   4/28/2023 1225 by Onofre Lugo RN  Outcome: Completed  4/28/2023 1042 by Onofre Lugo RN  Outcome: Progressing     Problem: DISCHARGE PLANNING  Goal: Discharge to home or other facility with appropriate resources  Description: INTERVENTIONS:  - Identify barriers to discharge w/patient and caregiver  - Arrange for needed discharge resources and transportation as appropriate  - Identify discharge learning needs (meds, wound care, etc )  - Arrange for interpretive services to assist at discharge as needed  - Refer to Case Management Department for coordinating discharge planning if the patient needs post-hospital services based on physician/advanced practitioner order or complex needs related to functional status, cognitive ability, or social support system  4/28/2023 1225 by Onofre Lugo RN  Outcome: Completed  4/28/2023 1042 by Onofre Lugo RN  Outcome: Progressing     Problem: Knowledge Deficit  Goal: Patient/family/caregiver demonstrates understanding of disease process, treatment plan, medications, and discharge instructions  Description: Complete learning assessment and assess knowledge base    Interventions:  - Provide teaching at level of understanding  - Provide teaching via preferred learning methods  4/28/2023 1225 by Onofre Lugo RN  Outcome: Completed  4/28/2023 1042 by Onofre Lugo RN  Outcome: Progressing

## 2023-04-28 NOTE — PLAN OF CARE
Problem: PAIN - ADULT  Goal: Verbalizes/displays adequate comfort level or baseline comfort level  Description: Interventions:  - Encourage patient to monitor pain and request assistance  - Assess pain using appropriate pain scale  - Administer analgesics based on type and severity of pain and evaluate response  - Implement non-pharmacological measures as appropriate and evaluate response  - Consider cultural and social influences on pain and pain management  - Notify physician/advanced practitioner if interventions unsuccessful or patient reports new pain  Outcome: Progressing     Problem: INFECTION - ADULT  Goal: Absence or prevention of progression during hospitalization  Description: INTERVENTIONS:  - Assess and monitor for signs and symptoms of infection  - Monitor lab/diagnostic results  - Monitor all insertion sites, i e  indwelling lines, tubes, and drains  - Monitor endotracheal if appropriate and nasal secretions for changes in amount and color  - [place] appropriate cooling/warming therapies per order  - Administer medications as ordered  - Instruct and encourage patient and family to use good hand hygiene technique  - Identify and instruct in appropriate isolation precautions for identified infection/condition  Outcome: Progressing  Goal: Absence of fever/infection during neutropenic period  Description: INTERVENTIONS:  - Monitor WBC    Outcome: Progressing     Problem: SAFETY ADULT  Goal: Patient will remain free of falls  Description: INTERVENTIONS:  - Educate patient/family on patient safety including physical limitations  - Instruct patient to call for assistance with activity   - Consult OT/PT to assist with strengthening/mobility   - Keep Call bell within reach  - Keep bed low and locked with side rails adjusted as appropriate  - Keep care items and personal belongings within reach  - Initiate and maintain comfort rounds  - Make Fall Risk Sign visible to staff  - Offer Toileting every  Hours, in advance of need  - Initiate/Maintain alarm  - Obtain necessary fall risk management equipment:   - Apply yellow socks and bracelet for high fall risk patients  - Consider moving patient to room near nurses station  Outcome: Progressing  Goal: Maintain or return to baseline ADL function  Description: INTERVENTIONS:  -  Assess patient's ability to carry out ADLs; assess patient's baseline for ADL function and identify physical deficits which impact ability to perform ADLs (bathing, care of mouth/teeth, toileting, grooming, dressing, etc )  - Assess/evaluate cause of self-care deficits   - Assess range of motion  - Assess patient's mobility; develop plan if impaired  - Assess patient's need for assistive devices and provide as appropriate  - Encourage maximum independence but intervene and supervise when necessary  - Involve family in performance of ADLs  - Assess for home care needs following discharge   - Consider OT consult to assist with ADL evaluation and planning for discharge  - Provide patient education as appropriate  Outcome: Progressing  Goal: Maintains/Returns to pre admission functional level  Description: INTERVENTIONS:  - Perform BMAT or MOVE assessment daily    - Set and communicate daily mobility goal to care team and patient/family/caregiver  - Collaborate with rehabilitation services on mobility goals if consulted  - Perform Range of Motion  times a day  - Reposition patient every  hours    - Dangle patient  times a day  - Stand patient  times a day  - Ambulate patient  times a day  - Out of bed to chair  times a day   - Out of bed for meals times a day  - Out of bed for toileting  - Record patient progress and toleration of activity level   Outcome: Progressing     Problem: DISCHARGE PLANNING  Goal: Discharge to home or other facility with appropriate resources  Description: INTERVENTIONS:  - Identify barriers to discharge w/patient and caregiver  - Arrange for needed discharge resources and transportation as appropriate  - Identify discharge learning needs (meds, wound care, etc )  - Arrange for interpretive services to assist at discharge as needed  - Refer to Case Management Department for coordinating discharge planning if the patient needs post-hospital services based on physician/advanced practitioner order or complex needs related to functional status, cognitive ability, or social support system  Outcome: Progressing     Problem: Knowledge Deficit  Goal: Patient/family/caregiver demonstrates understanding of disease process, treatment plan, medications, and discharge instructions  Description: Complete learning assessment and assess knowledge base    Interventions:  - Provide teaching at level of understanding  - Provide teaching via preferred learning methods  Outcome: Progressing

## 2023-04-28 NOTE — ASSESSMENT & PLAN NOTE
· Na 129  · In setting of ANDRA, spironolactone use  · Resolved with hydration   · Continue to monitor in the outpatient setting  · Nephrology consulted--- appreciate input

## 2023-04-30 LAB
BACTERIA UR CULT: ABNORMAL
BACTERIA UR CULT: ABNORMAL

## 2023-05-02 NOTE — ED PROVIDER NOTES
"History  Chief Complaint   Patient presents with    Shortness of Breath     Pt reports hx nasal fracture 3/22, was sent home on O2 \"at night\"  Pt reports recent congestion, dizziness, has been checking her O2 levels at home and reports they are frequently in the high 80s  O2 is 92% in triage at this time  75 yr  female with  Copd/ ckd= on home o2 at night -- recent fall nasal fx-- over last several days with increased nasal congestion - dry cough -sob- low pulse into high 80's low 90's at time over last several days- - no fever-  Pt has chronic  dope- no sign chagne-- no change in ble edema- -- no hx of vte- no other comps       History provided by:  Patient and spouse   used: No    Shortness of Breath  Onset quality:  Gradual  Duration:  3 days  Timing:  Intermittent  Associated symptoms: cough    Associated symptoms: no diaphoresis, no ear pain, no fever, no sore throat and no wheezing        Prior to Admission Medications   Prescriptions Last Dose Informant Patient Reported? Taking?    B-D ULTRAFINE III SHORT PEN 31G X 8 MM MISC   Yes No   Sig: USE AS DIRECTED 4 TIMES PER DAY   Patient not taking: Reported on 4/11/2023   Calcium Citrate 1040 MG TABS   Yes No   Sig: Take 500 mg by mouth Three times a day   DULoxetine (CYMBALTA) 30 mg delayed release capsule   Yes No   Sig: Take 60 mg by mouth daily   Probiotic Product (PROBIOTIC PO)   Yes No   Sig: Take 1 capsule by mouth 2 (two) times a day   Vitamin D, Ergocalciferol, 2000 units CAPS   Yes No   Sig: Take 2,000 Units by mouth daily    acetaminophen (TYLENOL) 325 mg tablet   No No   Sig: Take 3 tablets (975 mg total) by mouth every 8 (eight) hours   albuterol (Ventolin HFA) 90 mcg/act inhaler   No No   Sig: Inhale 2 puffs every 6 (six) hours as needed for wheezing   amLODIPine (NORVASC) 5 mg tablet   No No   Sig: Take 1 tablet (5 mg total) by mouth daily   b complex vitamins capsule   Yes No   Sig: Take 1 capsule by mouth daily   estradiol " "(ESTRACE VAGINAL) 0 1 mg/g vaginal cream   No No   Sig: Apply pea sized amount around urethra and inside vaginal opening 3 times weekly   gabapentin (Neurontin) 300 mg capsule   No No   Sig: Take 1 capsule (300 mg total) by mouth daily at bedtime   insulin aspart (NovoLOG) 100 units/mL injection   Yes No   Sig: Inject under the skin 3 (three) times a day before meals 16 units, 16 units, 25 units     insulin detemir (Levemir FlexTouch) 100 Units/mL injection pen   Yes No   Sig: Inject 50 Units under the skin every evening    metoprolol tartrate (LOPRESSOR) 50 mg tablet   No No   Sig: TAKE 1 TABLET (50 MG TOTAL) BY MOUTH EVERY 12 (TWELVE) HOURS   olmesartan (BENICAR) 40 mg tablet   No No   Sig: Take 1 tablet (40 mg total) by mouth daily at bedtime   pantoprazole (PROTONIX) 40 mg tablet   No No   Sig: TAKE 1 TABLET BY MOUTH TWICE A DAY   pramipexole (MIRAPEX) 0 25 mg tablet   No No   Sig: Take 1 tablet (0 25 mg total) by mouth daily at bedtime   sodium chloride (OCEAN) 0 65 % nasal spray   No No   Si spray into each nostril every hour as needed for congestion   torsemide (DEMADEX) 20 mg tablet   No No   Sig: TAKE 1 TABLET BY MOUTH EVERY DAY      Facility-Administered Medications: None       Past Medical History:   Diagnosis Date    Allergic     Allergic rhinitis     Anesthesia     pt reports \"had to use double lumen endo tube for hiatal hernia repair/so surgeon could get to where he needed to work\"    Arthritis     Aspiration into airway     Ahumada esophagus     Lot of stress with children    Basal cell carcinoma     left cheek     BCC (basal cell carcinoma) 2021    Left Nasal tip    Cancer (HCC)     squamous cell cancer on forhead    Cancer ([facility] [address] )     basal cell on nose     Cholelithiasis     Chronic kidney disease ,     Stones, kidney disease stage 4    Chronic pain disorder     bilat feet and joint pain on occas    Colon polyp     Constipation     COPD (chronic obstructive " pulmonary disease) ([facility] )     COVID-19 08/2021    recovered at home/did receive monoclonal infusion    Dental bridge present     Depression     Diabetes mellitus ([place] [address] )     Type 2    Diabetic neuropathy ([place] [address] )     Disease of thyroid gland     Family history of thyroid problem     Fatty liver     GERD (gastroesophageal reflux disease)     Heart burn     Hiatal hernia     History of pneumonia     Hyperlipidemia     Hyperplasia, parathyroid ([facility] )     Hypertension     Kidney problem     Kidney stone     Memory loss Julu2 2020    Motion sickness     Motion sickness     Neck pain     Obesity 1978    Obesity (BMI 30 0-34  9)     Pollen allergies     RLS (restless legs syndrome)     SCC (squamous cell carcinoma) 05/04/2021    left mid forehead    Seasonal allergies     Sleep apnea     has inspire    Squamous cell skin cancer 2007    left cheek     Swollen ankles     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, left     great toe fusion    Urinary incontinence     Urinary tract infection 03/28/2022    Wears glasses        Past Surgical History:   Procedure Laterality Date    ABDOMINAL SURGERY  [id number]    Nissen x2 1972 tubal ligarion    ARTHROSCOPY KNEE      BREAST BIOPSY      stereotactic-benign    BREAST BIOPSY      stereo-benign    BREAST EXCISIONAL BIOPSY      unknown date-benign    BREAST EXCISIONAL BIOPSY      unknown date-benign    BREAST EXCISIONAL BIOPSY      unknown date-benign    BREAST EXCISIONAL BIOPSY      unknown age-benign    CARDIAC CATHETERIZATION      CHOLECYSTECTOMY      COLONOSCOPY      EXAMINATION UNDER ANESTHESIA N/A 06/24/2021    Procedure: EXAM UNDER ANESTHESIA (EUA), DISE;  Surgeon: Jose Morin MD;  Location: [facility] MAIN OR;  Service: ENT    HERNIA REPAIR      HIATAL HERNIA REPAIR      KNEE SURGERY      Emy webb lap surg    LIPOSUCTION      LYMPH NODE BIOPSY      MOHS SURGERY  05/20/2021    left mid forehead-Evan    MOHS SURGERY Left 05/27/2021    Left nasal tip- gautam     DC LIGATION/BIOPSY TEMPORAL ARTERY Left 01/05/2023    Procedure: BIOPSY ARTERY TEMPORAL;  Surgeon: Keyla Alaniz DO;  Location: [facility] Main OR;  Service: Vascular    DC OPEN IMPLANTATION CRANIAL NERVE VASQUEZ & PULSE GEN N/A 11/10/2021    Procedure: INSERTION UPPER AIRWAY STIMULATOR, INSPIRE IMPLANT;  Surgeon: Chriss Kent MD;  Location: [facility] Main OR;  Service: ENT    DC UNLISTED PROCEDURE FOOT/TOES Right 07/19/2022    Procedure: 1st metatarsal phalangeal joint fusion;  Surgeon: Dallas Guardado DPM;  [facility] Main OR;  Service: Podiatry    REDUCTION MAMMAPLASTY Bilateral 2000    REDUCTION MAMMAPLASTY      SKIN BIOPSY      SKIN CANCER EXCISION  2007    squamous cell carcinoma     SKIN CANCER EXCISION  2007    basal cell carcinoma    SQUAMOUS CELL CARCINOMA EXCISION      TOE SURGERY Right 08/04/2022    Right Great Toe Fusion    TONSILLECTOMY      TUBAL LIGATION      UPPER GASTROINTESTINAL ENDOSCOPY      US GUIDED BREAST BIOPSY RIGHT COMPLETE Right 07/18/2022       Family History   Problem Relation Age of Onset    Heart disease Mother     Depression Mother     Hypertension Mother    Rah Lone COPD Mother     Hearing loss Mother     Anxiety disorder Mother     Heart disease Father     Lung cancer Father 79        Smoker     Cancer Father         brain    Alcohol abuse Father    Rah Lone Dementia Father     Hypertension Father     Thyroid disease Father     COPD Father     Arthritis Father     Brain cancer Father 76    Hypertension Sister     Diabetes Sister     Heart disease Sister     Thyroid disease Sister    Rah Lone Cancer Sister         Lympoma, lung    Lung cancer Sister 78    Anxiety disorder Sister     Hypertension Brother     Diabetes Brother     Cancer Brother         Throat    Dementia Brother     Stroke Brother     Hypertension Brother     Heart disease Brother     Diabetes Brother     Hypertension Brother     No Known Problems Son     No Known Problems Son     Brain cancer Paternal [de-identified]         unknown age   Cheryl Erm Diabetes Sister     Hypertension Sister     Diabetes Brother     Breast cancer Neg Hx      I have reviewed and agree with the history as documented  E-Cigarette/Vaping    E-Cigarette Use Never User      E-Cigarette/Vaping Substances    Nicotine No     THC No     CBD No     Flavoring No     Other No     Unknown No      Social History     Tobacco Use    Smoking status: Former     Packs/day: 2 00     Years: 10 00     Pack years: 20 00     Types: Cigarettes     Start date: 1968     Quit date: 1976     Years since quittin 2    Smokeless tobacco: Never    Tobacco comments:     Smoked 2 pack a day   Vaping Use    Vaping Use: Never used   Substance Use Topics    Alcohol use: Yes     Comment: 1 or 2 a year    Drug use: No       Review of Systems   Constitutional: Positive for activity change and fatigue  Negative for appetite change, chills, diaphoresis, fever and unexpected weight change  HENT: Positive for congestion  Negative for dental problem, drooling, ear discharge, ear pain, facial swelling, hearing loss, mouth sores, nosebleeds, postnasal drip, rhinorrhea, sinus pressure, sinus pain, sneezing, sore throat, tinnitus, trouble swallowing and voice change  Eyes: Negative  Negative for photophobia, pain, discharge, redness, itching and visual disturbance  Respiratory: Positive for cough and shortness of breath  Negative for apnea, choking, chest tightness, wheezing and stridor  Cardiovascular: Negative  Gastrointestinal: Negative  Endocrine: Negative  Genitourinary: Negative  Musculoskeletal: Negative  Skin: Negative  Allergic/Immunologic: Negative  Neurological: Negative  Hematological: Negative  Psychiatric/Behavioral: Negative  Physical Exam  Physical Exam  Vitals and nursing note reviewed  Constitutional:       General: She is not in acute distress       Appearance: She is well-developed  She is not ill-appearing, toxic-appearing or diaphoretic  Interventions: She is not intubated  Comments: avss-  Pulse ox 97 % on 2 liters o2 nc- interpreation is normal- in nad    HENT:      Head: Normocephalic and atraumatic  Mouth/Throat:      Mouth: Mucous membranes are moist       Pharynx: No pharyngeal swelling or oropharyngeal exudate  Eyes:      Extraocular Movements: Extraocular movements intact  Pupils: Pupils are equal, round, and reactive to light  Comments: Mm pink   Neck:      Thyroid: No thyromegaly  Vascular: No hepatojugular reflux or JVD  Trachea: No tracheal deviation  Cardiovascular:      Rate and Rhythm: Normal rate and regular rhythm  No extrasystoles are present  Pulses: Normal pulses  No decreased pulses  Heart sounds: Normal heart sounds  No murmur heard  No friction rub  No gallop  Pulmonary:      Effort: Pulmonary effort is normal  No tachypnea, bradypnea, accessory muscle usage or respiratory distress  She is not intubated  Breath sounds: No stridor  Examination of the right-lower field reveals decreased breath sounds  Examination of the left-lower field reveals decreased breath sounds  Decreased breath sounds present  No wheezing, rhonchi or rales  Comments: Mild decreased bs bilateraly- sym bs-b   Chest:      Chest wall: No mass, deformity, tenderness, crepitus or edema  There is no dullness to percussion  Abdominal:      General: Bowel sounds are normal       Palpations: Abdomen is soft  There is no hepatomegaly, splenomegaly or mass  Tenderness: There is no abdominal tenderness  There is no guarding or rebound  Comments: Soft nt/nd- no hsm- no cva tenderness- no peritoneal signs - no pulsatile abvd mass/bruit/ tenderness   Musculoskeletal:         General: Normal range of motion  Cervical back: Normal range of motion and neck supple  Right lower leg: No tenderness  Edema present  Left lower leg: No tenderness  Edema present  Comments: 1 plus  ble pretibial edema- nt- no asym- no erythema- equal bilateral radial/dp pulses   Lymphadenopathy:      Cervical: No cervical adenopathy  Skin:     General: Skin is warm  Capillary Refill: Capillary refill takes less than 2 seconds  Coloration: Skin is not cyanotic or pale  Findings: No ecchymosis, erythema or rash  Nails: There is no clubbing  Neurological:      General: No focal deficit present  Mental Status: She is alert and oriented to person, place, and time  Cranial Nerves: No cranial nerve deficit  Motor: No weakness  Comments: Normal non focal neuro ecxm    Psychiatric:         Mood and Affect: Mood normal  Mood is not anxious  Behavior: Behavior normal  Behavior is not agitated           Vital Signs  ED Triage Vitals [04/25/23 1934]   Temperature Pulse Respirations Blood Pressure SpO2   98 2 °F (36 8 °C) 72 18 118/60 94 %      Temp Source Heart Rate Source Patient Position - Orthostatic VS BP Location FiO2 (%)   Oral Monitor Lying Right arm --      Pain Score       2           Vitals:    04/27/23 1510 04/27/23 2109 04/27/23 2128 04/28/23 0742   BP: 100/67 121/63 121/63 158/79   Pulse: 72 75 75 70   Patient Position - Orthostatic VS:             Visual Acuity      ED Medications  Medications   lactated ringers bolus 500 mL (500 mL Intravenous New Bag 4/25/23 2220)       Diagnostic Studies  Results Reviewed     Procedure Component Value Units Date/Time    HS Troponin I 2hr [[id number]]  (Normal) Collected: 04/25/23 2219    Lab Status: Final result Specimen: Blood from Arm, Right Updated: 04/25/23 2253     hs TnI 2hr 5 ng/L      Delta 2hr hsTnI 1 ng/L     COVID/FLU/RSV [[id number]]  (Normal) Collected: 04/25/23 2047    Lab Status: Final result Specimen: Nares from Nose Updated: 04/25/23 2129     SARS-CoV-2 Negative     INFLUENZA A PCR Negative     INFLUENZA B PCR Negative     RSV PCR Negative Narrative:      FOR PEDIATRIC PATIENTS - copy/paste COVID Guidelines URL to browser: [email or web address]    SARS-CoV-2 assay is a Nucleic Acid Amplification assay intended for the  qualitative detection of nucleic acid from SARS-CoV-2 in nasopharyngeal  swabs  Results are for the presumptive identification of SARS-CoV-2 RNA  Positive results are indicative of infection with SARS-CoV-2, the virus  causing COVID-19, but do not rule out bacterial infection or co-infection  with other viruses  Laboratories within the [place] and its  territories are required to report all positive results to the appropriate  public health authorities  Negative results do not preclude SARS-CoV-2  infection and should not be used as the sole basis for treatment or other  patient management decisions  Negative results must be combined with  clinical observations, patient history, and epidemiological information  This test has not been FDA cleared or approved  This test has been authorized by FDA under an Emergency Use Authorization  (EUA)  This test is only authorized for the duration of time the  declaration that circumstances exist justifying the authorization of the  emergency use of an in vitro diagnostic tests for detection of SARS-CoV-2  virus and/or diagnosis of COVID-19 infection under section 564(b)(1) of  the Act, 21 U  S C  254OXU-2(Y)(1), unless the authorization is terminated  or revoked sooner  The test has been validated but independent review by FDA  and CLIA is pending  Test performed using [employer] GeneXpert: This RT-PCR assay targets N2,  a region unique to SARS-CoV-2  A conserved region in the E-gene was chosen  for pan-Sarbecovirus detection which includes SARS-CoV-2  According to CMS-2020-01-R, this platform meets the definition of high-throughput technology      Comprehensive metabolic panel [[id number]]  (Abnormal) Collected: 04/25/23 2008 Lab Status: Final result Specimen: Blood from Arm, Left Updated: 04/25/23 2043     Sodium 129 mmol/L      Potassium 5 4 mmol/L      Chloride 93 mmol/L      CO2 25 mmol/L      ANION GAP 11 mmol/L      BUN 88 mg/dL      Creatinine 3 50 mg/dL      Glucose 177 mg/dL      Calcium 10 0 mg/dL      AST 23 U/L      ALT 16 U/L      Alkaline Phosphatase 71 U/L      Total Protein 7 3 g/dL      Albumin 4 2 g/dL      Total Bilirubin 0 45 mg/dL      eGFR 12 ml/min/1 73sq m     Narrative:      National Kidney Disease Foundation guidelines for Chronic Kidney Disease (CKD):     Stage 1 with normal or high GFR (GFR > 90 mL/min/1 73 square meters)    Stage 2 Mild CKD (GFR = 60-89 mL/min/1 73 square meters)    Stage 3A Moderate CKD (GFR = 45-59 mL/min/1 73 square meters)    Stage 3B Moderate CKD (GFR = 30-44 mL/min/1 73 square meters)    Stage 4 Severe CKD (GFR = 15-29 mL/min/1 73 square meters)    Stage 5 End Stage CKD (GFR <15 mL/min/1 73 square meters)  Note: GFR calculation is accurate only with a steady state creatinine    HS Troponin 0hr (reflex protocol) [[id number]]  (Normal) Collected: 04/25/23 2008    Lab Status: Final result Specimen: Blood from Arm, Left Updated: 04/25/23 2041     hs TnI 0hr 4 ng/L     Blood gas, venous [[id number]]  (Abnormal) Collected: 04/25/23 2036    Lab Status: Final result Specimen: Blood from Arm, Left Updated: 04/25/23 2041     pH, Tato 7 405     pCO2, Tato 41 6 mm Hg      pO2, Tato 47 2 mm Hg      HCO3, Tato 25 5 mmol/L      Base Excess, Tato 0 7 mmol/L      O2 Content, Atto 15 2 ml/dL      O2 HGB, VENOUS 82 4 %     CBC and differential [[id number]]  (Abnormal) Collected: 04/25/23 2008    Lab Status: Final result Specimen: Blood from Arm, Left Updated: 04/25/23 2020     WBC 10 62 Thousand/uL      RBC 4 26 Million/uL      Hemoglobin 12 7 g/dL      Hematocrit 38 9 %      MCV 91 fL      MCH 29 8 pg      MCHC 32 6 g/dL      RDW 14 1 %      MPV 11 7 fL      Platelets 668 Thousands/uL      nRBC 0 /100 WBCs Neutrophils Relative 58 %      Immat GRANS % 1 %      Lymphocytes Relative 27 %      Monocytes Relative 9 %      Eosinophils Relative 5 %      Basophils Relative 0 %      Neutrophils Absolute 6 26 Thousands/µL      Immature Grans Absolute 0 08 Thousand/uL      Lymphocytes Absolute 2 83 Thousands/µL      Monocytes Absolute 0 93 Thousand/µL      Eosinophils Absolute 0 48 Thousand/µL      Basophils Absolute 0 04 Thousands/µL                  XR chest pa & lateral   Final Result by Douglas Ramsey MD (04/26 1510)      No acute cardiopulmonary disease  No significant change  Workstation performed: [id number]                    Procedures  Procedures         ED Course  ED Course as of 05/02/23 1618   Tue Apr 25, 2023 2020 ER MD NOTE- INITIAL ER WORKUP WAS FIRST NURSED    2210 CXR PA/LAT- COMPARED TO PREVIOUS 3/24/23-  R SIDED SLEEP APNEA DEVICE-- NO ACUTE CHANGES- NO CHANGE IN MEDIASTINUM/CARDIAC SILHOUETTE- NO FREE/SQ AIR- NO INFILTRATE- PTX- PULM EDEMA- PLEURAL EFFUSIONS   2237 ER MD NOTE- BLADDER SCAN 290                                             Medical Decision Making  -- pt with copd- with ew resp sympotms- pt will need viral testing- cxdr- ecg-- pt also with ckd-- will need  Repeat renal testing --     Acute-on-chronic kidney injury [facility]): acute illness or injury that poses a threat to life or bodily functions     Details: see above- pt will need admti- ivf hydration   COPD exacerbation ([facility] [address] ): acute illness or injury that poses a threat to life or bodily functions     Details: see above   Hyperkalemia: acute illness or injury that poses a threat to life or bodily functions     Details: pt will need hydration   Hyponatremia: acute illness or injury with systemic symptoms     Details: pt with fatitue- will need serial testing   Amount and/or Complexity of Data Reviewed  Independent Historian: spouse  External Data Reviewed: labs, radiology, ECG and notes       Details: all recent and previous testing lab/s urine/ iamging reviewed by er md  Labs: ordered  Decision-making details documented in ED Course  Details: all reviewed by er md   Radiology: ordered and independent interpretation performed  Details: all reviewed by er md   ECG/medicine tests: ordered and independent interpretation performed  Details: all reviewed by er md   Discussion of management or test interpretation with external provider(s): Moderate amount of er md thought complexity and workup- please see chart and above     Risk  Decision regarding hospitalization  Disposition  Final diagnoses:   COPD exacerbation ([facility] )   Acute-on-chronic kidney injury ([place] [address] )   Hyperkalemia   Hyponatremia     Time reflects when diagnosis was documented in both MDM as applicable and the Disposition within this note     Time User Action Codes Description Comment    4/25/2023 10:42 PM Anthonette Loft Add [J44 1] COPD exacerbation ([place] [address] )     4/25/2023 10:42 PM Anthonette Loft Add [N17 9,  N18 9] Acute-on-chronic kidney injury ([place] [address] )     4/25/2023 10:43 PM Lucendia Kateryna D Add [E87 5] Hyperkalemia     4/25/2023 10:43 PM Lucendia Kateryna D Add [E87 1] Hyponatremia     4/28/2023 11:09 AM Ascencion Iniguez Add [N30 00] Acute cystitis without hematuria       ED Disposition     ED Disposition   Admit    Condition   Stable    Date/Time   Tue Apr 25, 2023 10:54 PM    Comment   Case was discussed with /  Jacinto Wang the patient's admission status was agreed to be Admission Status: inpatient status to the service of Dr Piter Tate               Follow-up Information    None         Discharge Medication List as of 4/28/2023 11:43 AM      START taking these medications    Details   cephalexin (KEFLEX) 500 mg capsule Take 1 capsule (500 mg total) by mouth every 8 (eight) hours for 5 days, Starting Fri 4/28/2023, Until Wed 5/3/2023, Normal         CONTINUE these medications which have NOT CHANGED    Details   estradiol (ESTRACE VAGINAL) 0 1 mg/g vaginal cream Apply pea sized amount around urethra and inside vaginal opening 3 times weekly, Normal      acetaminophen (TYLENOL) 325 mg tablet Take 3 tablets (975 mg total) by mouth every 8 (eight) hours, Starting Thu 3/23/2023, No Print      albuterol (Ventolin HFA) 90 mcg/act inhaler Inhale 2 puffs every 6 (six) hours as needed for wheezing, Starting Thu 8/4/2022, Normal      amLODIPine (NORVASC) 5 mg tablet Take 1 tablet (5 mg total) by mouth daily, Starting Tue 2/14/2023, Normal      b complex vitamins capsule Take 1 capsule by mouth daily, Historical Med      B-D ULTRAFINE III SHORT PEN 31G X 8 MM MISC USE AS DIRECTED 4 TIMES PER DAY, Historical Med      Calcium Citrate 1040 MG TABS Take 500 mg by mouth Three times a day, Starting Wed 11/16/2022, Historical Med      DULoxetine (CYMBALTA) 30 mg delayed release capsule Take 60 mg by mouth daily, Starting Wed 9/7/2022, Historical Med      gabapentin (Neurontin) 300 mg capsule Take 1 capsule (300 mg total) by mouth daily at bedtime, Starting Thu 3/30/2023, No Print      insulin aspart (NovoLOG) 100 units/mL injection Inject under the skin 3 (three) times a day before meals 16 units, 16 units, 25 units  , Historical Med      insulin detemir (Levemir FlexTouch) 100 Units/mL injection pen Inject 50 Units under the skin every evening , Starting Tue 6/8/2021, Historical Med      metoprolol tartrate (LOPRESSOR) 50 mg tablet TAKE 1 TABLET (50 MG TOTAL) BY MOUTH EVERY 12 (TWELVE) HOURS, Starting Thu 5/12/2022, Normal      pantoprazole (PROTONIX) 40 mg tablet TAKE 1 TABLET BY MOUTH TWICE A DAY, Normal      pramipexole (MIRAPEX) 0 25 mg tablet Take 1 tablet (0 25 mg total) by mouth daily at bedtime, Starting Wed 2/15/2023, Normal      Probiotic Product (PROBIOTIC PO) Take 1 capsule by mouth 2 (two) times a day, Historical Med      sodium chloride (OCEAN) 0 65 % nasal spray 1 spray into each nostril every hour as needed for congestion, Starting Thu 3/23/2023, Normal Vitamin D, Ergocalciferol, 2000 units CAPS Take 2,000 Units by mouth daily , Historical Med         STOP taking these medications       olmesartan (BENICAR) 40 mg tablet Comments:   Reason for Stopping:         torsemide (DEMADEX) 20 mg tablet Comments:   Reason for Stopping:               Outpatient Discharge Orders   Discharge Diet     Activity as tolerated       PDMP Review       Value Time User    PDMP Reviewed  Yes 3/24/2023  9:59 PM Karely Hemphill MD          ED Provider  Electronically Signed by           Juancho Haas MD  05/02/23 7120

## 2023-05-03 NOTE — ASSESSMENT & PLAN NOTE
Assessment and Plan:     Problem List Items Addressed This Visit        Digestive    Ulcerative colitis without complications ([address] )     Has bleeding at times and loose BMs  Continue current meds per Dr Jaylin Castillo  Last colonoscopy was in July 2022  Gastroesophageal reflux disease with esophagitis     Stable  Pt had 48 hr pH monitor and EGD in Feb 2022 by Dr Jaylin Castillo  Continue pantoprazole 40 mg bid and famotidine 40 mg before supper  Endocrine    Hypothyroidism     TSH 2 670 and Free T4 1 43 in Nov 2022  Continue levothyroxine 75 mcg qd  Cardiovascular and Mediastinum    Essential hypertension     BP up and down  Continue valsartan 80 mg qd  Has been holding amlodipine for past 6 weeks to see if it helped her cough but no change  Pt told to restart it  Pt advised to continue low Na diet and to exercise on a regular basis  Relevant Medications    amLODIPine (NORVASC) 10 mg tablet       Other    Vitamin D deficiency     Recheck level  Continue 4000 U qd  Relevant Orders    Vitamin D 25 hydroxy    Medicare annual wellness visit, subsequent - Primary     Wellness exam done  Had COVID vaccines  Had prevnar 13 and pneumovacc 23  Recommend Tdap and Shingrix series  Labs are UTD  For mammogram in June 2023  Last colonoscopy was in July 2022 and no further ones needed per GI  Has living will  Mood good  No recent falls  Hypercholesterolemia     LDL 65 and LFTs were normal in Nov 2022  Continue atorvastatin 20 mg qhs  Advised pt to follow a low cholesterol diet and to exercise on a regular basis  Anemia     Taking Fe pill qd  Hgb lower at 9 5 in Feb 2023  Recheck and if lower, will refer to Hematology  Relevant Orders    CBC and differential     BMI Counseling: Body mass index is 27 07 kg/m²   The BMI is above normal  Nutrition recommendations include decreasing portion sizes, encouraging healthy choices of fruits and vegetables, consuming healthier Lab Results   Component Value Date    HGBA1C 6 8 (H) 01/06/2022       No results for input(s): POCGLU in the last 72 hours      Blood Sugar Average: Last 72 hrs:  · History gastroparesis, difficulty presents as constipation  · GI consult snacks and moderation in carbohydrate intake  Exercise recommendations include exercising 3-5 times per week  No pharmacotherapy was ordered  Rationale for BMI follow-up plan is due to patient being overweight or obese  Preventive health issues were discussed with patient, and age appropriate screening tests were ordered as noted in patient's After Visit Summary  Personalized health advice and appropriate referrals for health education or preventive services given if needed, as noted in patient's After Visit Summary  History of Present Illness:     Patient presents for a Medicare Wellness Visit    Pt here for wellness exam and f/u HTN, HL, Hypothyroidism, Vit D Def, GERD, UC, Anemia  Pt doing ok  No cp/sob  No HA  Still gets bleeding at times with UC if goes off diet  Last episode was last week  BP up and down at home  No recent GERD  Still gets cough and seeing ENT  Patient Care Team:  Nicolasa Park MD as PCP - General (Family Medicine)     Review of Systems:     Review of Systems   Constitutional: Negative for fatigue and unexpected weight change  Respiratory: Positive for cough  Negative for chest tightness and shortness of breath  Cardiovascular: Negative for chest pain and palpitations  Gastrointestinal: Negative for abdominal pain, constipation, diarrhea, nausea and vomiting  Genitourinary: Negative for difficulty urinating  Musculoskeletal: Negative for arthralgias  Skin: Negative for rash  Neurological: Negative for dizziness and headaches          Problem List:     Patient Active Problem List   Diagnosis    Essential hypertension    Hypercholesterolemia    Hypothyroidism    Ulcerative colitis without complications [facility] [address]    Diverticulosis    Low's esophagus    Colon polyp    Gastroesophageal reflux disease with esophagitis    Vitamin D deficiency    Anemia    Medicare annual wellness visit, subsequent    Chronic cough    Dairy product intolerance    [place] "allergy    B12 deficiency    Paresis of right vocal fold    Cough variant asthma     Dysphonia    Pharyngoesophageal dysphagia    Dysfunction of both eustachian tubes    Herpes zoster without complication    Non-rheumatic aortic stenosis    Calcified granuloma of lung (HCC)    Allergic rhinitis      Past Medical and Surgical History:     Past Medical History:   Diagnosis Date    Allergic rhinitis 3/21/2023    Anemia     Arthritis     osteoporosis, djd knees, pt denies osteoporosis on 2/21/22    Low's esophagus     Cancer ([address] )     on face    Colon polyp     Cough 02/21/2022    cough for 3 years, expectorates yellow mucus    Disease of thyroid gland     low thyroid    Diverticulitis     gerd, diverticulitis    Dizziness     GERD (gastroesophageal reflux disease)     ulcerative colitis;  Low's esophagus, diverticulosis; hx of laryngopharyngeal reflux    Hyperlipidemia     Hypertension     Low vitamin D level     LPRD (laryngopharyngeal reflux disease) 02/21/2022    PONV (postoperative nausea and vomiting)     AFTER \"BIG SURGERIES\"    Postnasal drip     WITH COUGH    Ulcerative colitis ([address] )      Past Surgical History:   Procedure Laterality Date    APPENDECTOMY      CATARACT EXTRACTION Bilateral     COLONOSCOPY      colon polyps    COLONOSCOPY      FOOT SURGERY Bilateral     bunionectomy    HEMORROIDECTOMY      hemorroid removal    JOINT REPLACEMENT Bilateral     knees    REPLACEMENT TOTAL KNEE Bilateral     b/ replacement    TONSILLECTOMY      TUBAL LIGATION      UPPER GASTROINTESTINAL ENDOSCOPY        Family History:     Family History   Problem Relation Age of Onset    Heart disease Brother     Cancer Brother 54    Cancer Daughter         unsure of type of cancer, it was female cancer and hysterectomy done by Dr Jimmy Cooley Heart disease Father     Cancer Brother 79      Social History:     Social History     Socioeconomic History    Marital status: " /[employer]     Spouse name: None    Number of children: None    Years of education: None    Highest education level: None   Occupational History    None   Tobacco Use    Smoking status: Never    Smokeless tobacco: Never   Vaping Use    Vaping Use: Never used   Substance and Sexual Activity    Alcohol use: Not Currently    Drug use: Never    Sexual activity: Not Currently   Other Topics Concern    None   Social History Narrative    · Occupation:   retired      · Marital status:         · Sexual orientation:   Heterosexual      · Alcohol intake:   Occasional      · Caffeine intake: Moderate      · Chewing tobacco:   none      · Guns present in home:   No      · Live alone or with others:   with others      · Pets:   No         Per missy      Social Determinants of Health     Financial Resource Strain: Low Risk     Difficulty of Paying Living Expenses: Not very hard   Food Insecurity: Not on file   Transportation Needs: No Transportation Needs    Lack of Transportation (Medical): No    Lack of Transportation (Non-Medical):  No   Physical Activity: Not on file   Stress: Not on file   Social Connections: Not on file   Intimate Partner Violence: Not on file   Housing Stability: Not on file      Medications and Allergies:     Current Outpatient Medications   Medication Sig Dispense Refill    amLODIPine (NORVASC) 10 mg tablet Take 1 tablet (10 mg total) by mouth daily 90 tablet 2    atorvastatin (LIPITOR) 20 mg tablet Take 1 tablet by mouth once daily 90 tablet 0    cholecalciferol (VITAMIN D3) 1,000 units tablet Take 1,000 Units by mouth daily      Coenzyme Q10 (Co Q 10) 100 MG CAPS Take by mouth      Cyanocobalamin (VITAMIN B 12 PO) Take by mouth      levothyroxine 75 mcg tablet Take 1 tablet by mouth once daily 90 tablet 0    Multiple Vitamin (multivitamin) capsule Take 1 capsule by mouth daily      pantoprazole (PROTONIX) 40 mg tablet TAKE 1 TABLET BY MOUTH TWICE DAILY 30 MINUTES BEFORE MEALS 60 tablet 0   • Promethazine-DM (PHENERGAN-DM) 6 25-15 mg/5 mL oral syrup Take 5 mL by mouth 2 (two) times daily after meals 240 mL 1   • sulfaSALAzine (AZULFIDINE) 500 mg tablet TAKE 2 TABLETS BY MOUTH TWICE A  tablet 2   • valsartan (DIOVAN) 80 mg tablet Take 1 tablet by mouth once daily 90 tablet 0   • vitamin A 2250 MCG (7500 UT) capsule Take 7,500 Units by mouth daily     • vitamin E, tocopherol, 1,000 units capsule Take 1,000 Units by mouth daily     • azelastine (ASTELIN) 0 1 % nasal spray 2 sprays into each nostril 2 (two) times a day (Patient not taking: Reported on 5/3/2023) 30 mL 11   • famotidine (PEPCID) 40 MG tablet TAKE 1 TABLET BY MOUTH ONCE DAILY BEFORE SUPPER (Patient not taking: Reported on 5/3/2023) 90 tablet 0   • ferrous gluconate (FERGON) 324 mg tablet Take 324 mg by mouth daily with breakfast Takes once a week (Patient not taking: Reported on 5/3/2023)     • fluticasone (FLONASE) 50 mcg/act nasal spray 2 sprays into each nostril daily (Patient not taking: Reported on 5/3/2023) 16 g 11   • ipratropium (ATROVENT) 0 03 % nasal spray 2 sprays into each nostril 3 (three) times a day as needed (nasal drip with meals) (Patient not taking: Reported on 5/3/2023) 30 mL 11   • nystatin (MYCOSTATIN) 500,000 units/5 mL suspension Apply 5 mL (500,000 Units total) to the mouth or throat 4 (four) times a day (Patient not taking: Reported on 5/3/2023) 200 mL 1   • valACYclovir (VALTREX) 1,000 mg tablet Take 1 tablet (1,000 mg total) by mouth 3 (three) times a day for 7 days (Patient not taking: Reported on 3/18/2022) 21 tablet 0     No current facility-administered medications for this visit       Allergies   Allergen Reactions   • Sulfa Antibiotics Itching      Immunizations:     Immunization History   Administered Date(s) Administered   • COVID-19 MODERNA VACC 0 25 ML IM BOOSTER 11/22/2021   • COVID-19 MODERNA VACC 0 5 ML IM 02/12/2021, 03/11/2021   • Influenza, high dose seasonal 0 7 mL 10/01/2020, 10/13/2021, 10/17/2022    Pneumococcal Conjugate 13-Valent 01/08/2015    Pneumococcal Polysaccharide PPV23 09/08/2009    Zoster Vaccine Recombinant 01/31/2022, 03/22/2022    influenza, trivalent, adjuvanted 10/07/2019      Health Maintenance: There are no preventive care reminders to display for this patient  Topic Date Due    COVID-19 Vaccine (4 - Booster for Moderna series) 01/17/2022      Medicare Screening Tests and Risk Assessments:     Evon Pickard is here for her Subsequent Wellness visit  Health Risk Assessment:   Patient rates overall health as fair  Patient feels that their physical health rating is same  Patient is very satisfied with their life  Eyesight was rated as same  Hearing was rated as same  Patient feels that their emotional and mental health rating is same  Patients states they are sometimes angry  Patient states they are sometimes unusually tired/fatigued  Pain experienced in the last 7 days has been none  Patient states that she has experienced weight loss or gain in last 6 months  Fall Risk Screening: In the past year, patient has experienced: no history of falling in past year      Urinary Incontinence Screening:   Patient has leaked urine accidently in the last six months  Home Safety:  Patient does not have trouble with stairs inside or outside of their home  Patient has working smoke alarms and has working carbon monoxide detector  Home safety hazards include: loose rugs on the floor  Nutrition:   Current diet is Other (please comment)  On a diet for colitis    Medications:   Patient is currently taking over-the-counter supplements  OTC medications include: see medication list  Patient is able to manage medications  Activities of Daily Living (ADLs)/Instrumental Activities of Daily Living (IADLs):   Walk and transfer into and out of bed and chair?: Yes  Dress and groom yourself?: Yes    Bathe or shower yourself?: Yes    Feed yourself?  Yes  Do your laundry/housekeeping?: Yes  Manage your money, pay your bills and track your expenses?: Yes  Make your own meals?: Yes    Do your own shopping?: Yes    Previous Hospitalizations:   Any hospitalizations or ED visits within the last 12 months?: Yes    How many hospitalizations have you had in the last year?: 1-2    Advance Care Planning:   Living will: Yes    Durable POA for healthcare: Yes    Advanced directive: Yes    Advanced directive counseling given: Yes    Five wishes given: No    Patient declined ACP directive: No    End of Life Decisions reviewed with patient: Yes    Provider agrees with end of life decisions: Yes      Cognitive Screening:   Provider or family/friend/caregiver concerned regarding cognition?: No    PREVENTIVE SCREENINGS      Cardiovascular Screening:    General: Screening Not Indicated, History Lipid Disorder and Screening Current      Diabetes Screening:     General: Screening Current      Colorectal Cancer Screening:     General: Screening Current      Breast Cancer Screening:     General: Screening Current      Cervical Cancer Screening:    General: Screening Not Indicated      Osteoporosis Screening:    General: Screening Current      Abdominal Aortic Aneurysm (AAA) Screening:        General: Screening Not Indicated      Lung Cancer Screening:     General: Screening Not Indicated    Screening, Brief Intervention, and Referral to Treatment (SBIRT)    Screening  Typical number of drinks in a day: 0  Typical number of drinks in a week: 0  Interpretation: Low risk drinking behavior      AUDIT-C Screenin) How often did you have a drink containing alcohol in the past year? never  2) How many drinks did you have on a typical day when you were drinking in the past year? 0  3) How often did you have 6 or more drinks on one occasion in the past year? never    AUDIT-C Score: 0  Interpretation: Score 0-2 (female): Negative screen for alcohol misuse    Brief Intervention  Alcohol & drug use "screenings were reviewed  No concerns regarding substance use disorder identified  No results found  Physical Exam:     /82 (BP Location: Left arm, Patient Position: Sitting, Cuff Size: Standard)   Pulse 72   Temp 97 7 °F (36 5 °C) (Tympanic)   Ht 5' 2\" (1 575 m)   Wt 67 1 kg (148 lb)   SpO2 96%   BMI 27 07 kg/m²     Physical Exam  Vitals and nursing note reviewed  Constitutional:       General: She is not in acute distress  Appearance: Normal appearance  She is well-developed  Neck:      Vascular: No carotid bruit  Cardiovascular:      Rate and Rhythm: Normal rate and regular rhythm  Heart sounds: No murmur heard  Pulmonary:      Effort: Pulmonary effort is normal  No respiratory distress  Breath sounds: Normal breath sounds  Musculoskeletal:      Cervical back: Normal range of motion and neck supple  Right lower leg: No edema  Left lower leg: No edema  Lymphadenopathy:      Cervical: No cervical adenopathy  Neurological:      Mental Status: She is alert  Psychiatric:         Mood and Affect: Mood normal          Behavior: Behavior normal          Thought Content:  Thought content normal          Judgment: Judgment normal           Agustin Bamberger, MD  "

## 2023-05-04 ENCOUNTER — OFFICE VISIT (OUTPATIENT)
Dept: UROLOGY | Facility: CLINIC | Age: 73
End: 2023-05-04

## 2023-05-04 VITALS
DIASTOLIC BLOOD PRESSURE: 80 MMHG | HEART RATE: 66 BPM | HEIGHT: 62 IN | OXYGEN SATURATION: 97 % | BODY MASS INDEX: 31.83 KG/M2 | WEIGHT: 173 LBS | SYSTOLIC BLOOD PRESSURE: 130 MMHG

## 2023-05-04 DIAGNOSIS — N39.0 RECURRENT UTI: Primary | ICD-10-CM

## 2023-05-04 LAB
POST-VOID RESIDUAL VOLUME, ML POC: 309 ML
SL AMB  POCT GLUCOSE, UA: NORMAL
SL AMB LEUKOCYTE ESTERASE,UA: NORMAL
SL AMB POCT BILIRUBIN,UA: NORMAL
SL AMB POCT BLOOD,UA: NORMAL
SL AMB POCT CLARITY,UA: CLEAR
SL AMB POCT COLOR,UA: YELLOW
SL AMB POCT KETONES,UA: NORMAL
SL AMB POCT NITRITE,UA: NORMAL
SL AMB POCT PH,UA: 6
SL AMB POCT SPECIFIC GRAVITY,UA: 1010
SL AMB POCT URINE PROTEIN: NORMAL
SL AMB POCT UROBILINOGEN: 0.2

## 2023-05-04 RX ORDER — CEPHALEXIN 250 MG/1
250 CAPSULE ORAL EVERY 8 HOURS SCHEDULED
Qty: 15 CAPSULE | Refills: 3 | Status: SHIPPED | OUTPATIENT
Start: 2023-05-04 | End: 2023-05-09

## 2023-05-04 NOTE — PROGRESS NOTES
1  Recurrent UTI  POCT urine dip    POCT Measure PVR    cephalexin (KEFLEX) 250 mg capsule    Urine culture    Urine culture    Urine culture        Assessment and plan:       1  Recurrent UTI  - proper hydration, avoidance of constipation, probiotic, tight glycemic control  - start estrace 3x weekly  -Standing urine culture and self start Keflex    2  Nephrolithiasis   - patient's stones are intraparenchymal & stable    3  CKD  4  Incomplete bladder emptying  -Continue CIC daily       [address]      Chief Complaint     Recurrent UTIs    History of Present Illness     Sang Pryor is a 75 y o  female presenting today for follow up of recurrent urinary infections  Symptoms typically consist of dysuria  Symptoms do resolve with antibiotics however typically returns a few weeks later  Urine cultures:  + Enterobacter cloacae 3/30/22  + Enterobacter cloacae 4/13/22  + Enterobacter cloacae 6/30/22  + Enterobacter cloacae 8/11/22  + Enterobacter cloacae 2/14/23  + E  Coli 4/26/23    CT 3/30/22 showing nonosbtructing intrarenal stones, largest 5mm, + hemorrhagic cysts  Stones appear to be parenchymal  Patient previously followed with Dr Ki Barry at [facility], last seen 2020  Left upper pole stones were stable dating to then  Had previously attempted ureteroscopy for said stones however they were intraparenchymal     Previous urge incontinence that had briefly been on oxybutynin for  Previously d/c'ed due to dry mouth  Patient started on estrace 3x weekly a few months ago  Unfortunately continually having recurrent infections  More recently hospitalized with infection  Has since transition to her  performing the CIC daily for her  She feels that this has been going well  Medical comorbidities include GERD, gastroparesis, constipation, type 2 diabetes, sleep apnea, COPD, pulmonary hypertension, chronic kidney disease, fibromyalgia, depression       Urine dip is leukocyte positive, nitrate and blood negative   mL    Laboratory     Lab Results   Component Value Date    CREATININE 1 90 (H) 04/28/2023       Review of Systems     Review of Systems   Constitutional: Negative for activity change, appetite change, chills, diaphoresis, fatigue, fever and unexpected weight change  Respiratory: Negative for chest tightness and shortness of breath  Cardiovascular: Negative for chest pain, palpitations and leg swelling  Gastrointestinal: Negative for abdominal distention, abdominal pain, constipation, diarrhea, nausea and vomiting  Genitourinary: Negative for decreased urine volume, difficulty urinating, dysuria, enuresis, flank pain, frequency, genital sores, hematuria and urgency  Musculoskeletal: Negative for back pain, gait problem and myalgias  Skin: Negative for color change, pallor, rash and wound  Psychiatric/Behavioral: Negative for behavioral problems  The patient is not nervous/anxious  Allergies     Allergies   Allergen Reactions    Sulfamethoxazole-Trimethoprim Other (See Comments)     Tongue swelling    Ciprofloxacin Hives and Itching       Physical Exam     Physical Exam  Constitutional:       General: She is not in acute distress  Appearance: Normal appearance  She is obese  She is not ill-appearing, toxic-appearing or diaphoretic  HENT:      Head: Normocephalic and atraumatic  Eyes:      General:         Right eye: No discharge  Left eye: No discharge  Conjunctiva/sclera: Conjunctivae normal    Pulmonary:      Effort: Pulmonary effort is normal  No respiratory distress  Musculoskeletal:         General: No swelling or tenderness  Skin:     General: Skin is warm and dry  Coloration: Skin is not jaundiced or pale  Neurological:      General: No focal deficit present  Mental Status: She is alert and oriented to person, place, and time     Psychiatric:         Mood and Affect: Mood normal          Behavior: Behavior normal  "Thought Content: Thought content normal        Vital Signs     Vitals:    05/04/23 1420   BP: 130/80   BP Location: Left arm   Patient Position: Sitting   Cuff Size: Standard   Pulse: 66   SpO2: 97%   Weight: 78 5 kg (173 lb)   Height: 5' 2\" (1 575 m)     Current Medications       Current Outpatient Medications:     acetaminophen (TYLENOL) 325 mg tablet, Take 3 tablets (975 mg total) by mouth every 8 (eight) hours, Disp: , Rfl: 0    albuterol (Ventolin HFA) 90 mcg/act inhaler, Inhale 2 puffs every 6 (six) hours as needed for wheezing, Disp: 54 g, Rfl: 0    amLODIPine (NORVASC) 5 mg tablet, Take 1 tablet (5 mg total) by mouth daily, Disp: 90 tablet, Rfl: 0    b complex vitamins capsule, Take 1 capsule by mouth daily, Disp: , Rfl:     B-D ULTRAFINE III SHORT PEN 31G X 8 MM MISC, , Disp: , Rfl: 3    Calcium Citrate 1040 MG TABS, Take 500 mg by mouth Three times a day, Disp: , Rfl:     cephalexin (KEFLEX) 250 mg capsule, Take 1 capsule (250 mg total) by mouth every 8 (eight) hours for 5 days, Disp: 15 capsule, Rfl: 3    DULoxetine (CYMBALTA) 30 mg delayed release capsule, Take 60 mg by mouth daily, Disp: , Rfl:     estradiol (ESTRACE VAGINAL) 0 1 mg/g vaginal cream, Apply pea sized amount around urethra and inside vaginal opening 3 times weekly, Disp: 42 5 g, Rfl: 2    gabapentin (Neurontin) 300 mg capsule, Take 1 capsule (300 mg total) by mouth daily at bedtime, Disp: 90 capsule, Rfl: 0    insulin aspart (NovoLOG) 100 units/mL injection, Inject under the skin 3 (three) times a day before meals 16 units, 16 units, 25 units  , Disp: , Rfl:     insulin detemir (Levemir FlexTouch) 100 Units/mL injection pen, Inject 50 Units under the skin every evening , Disp: , Rfl:     metoprolol tartrate (LOPRESSOR) 50 mg tablet, TAKE 1 TABLET (50 MG TOTAL) BY MOUTH EVERY 12 (TWELVE) HOURS, Disp: 180 tablet, Rfl: 3    pantoprazole (PROTONIX) 40 mg tablet, TAKE 1 TABLET BY MOUTH TWICE A DAY, Disp: 180 tablet, Rfl: 2    " pramipexole (MIRAPEX) 0 25 mg tablet, Take 1 tablet (0 25 mg total) by mouth daily at bedtime, Disp: 90 tablet, Rfl: 2    Probiotic Product (PROBIOTIC PO), Take 1 capsule by mouth 2 (two) times a day, Disp: , Rfl:     sodium chloride (OCEAN) 0 65 % nasal spray, 1 spray into each nostril every hour as needed for congestion, Disp: 15 mL, Rfl: 0    Vitamin D, Ergocalciferol, 2000 units CAPS, Take 2,000 Units by mouth daily , Disp: , Rfl:       Active Problems     Patient Active Problem List   Diagnosis    Stage 4 chronic kidney disease ([place] [address] )    Hypertensive chronic kidney disease with stage 1 through stage 4 chronic kidney disease, or unspecified chronic kidney disease    BARBARA (obstructive sleep apnea)    RLS (restless legs syndrome)    Persistent proteinuria    Type 2 diabetes mellitus with diabetic nephropathy, with long-term current use of insulin ([facility])    Pulmonary hypertension (HCC)    Dyspnea    Acute respiratory failure with hypoxia (HCC)    History of repair of hiatal hernia    ANDRA (acute kidney injury) ([place] [address] )    Dyslipidemia    Vitamin D deficiency    Anemia of chronic renal failure, stage 3 (moderate) ([facility])    Fibromyalgia    Type 2 diabetes mellitus with diabetic chronic kidney disease ([facility])    Leukocytosis    History of kidney stones    Urinary urgency    Primary hypertension    Ambulatory dysfunction    Memory changes    Gastroparesis diabeticorum ([facility])    B12 neuropathy ([facility])    Depression, recurrent (HCC)    Chronic respiratory failure with hypoxia ([facility])    Anemia in stage 4 chronic kidney disease ([place] [address] )    Epigastric abdominal pain with severe diabetic gastroparesis, status post EGD/Botox injection, 5/14    COPD (chronic obstructive pulmonary disease) ([facility])    BMI 29 0-29 9,adult    Chronic constipation    History of colon polyps    COVID-19 virus infection    Other chest pain    Diabetic nephropathy associated with type 2 diabetes mellitus ([place] [address] )    Abdominal pain    "Abnormal thyroid blood test    Hypotension    Urinary retention    UTI (urinary tract infection)    S/P foot surgery    Urinary incontinence    Headache    Hypothyroid    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    Liver function abnormality    Fall    Fracture of orbital floor, blow-out, right, closed, with routine healing, subsequent encounter    Nasal bones, closed fracture    Facial abrasion, initial encounter    Multiple closed fractures of ribs of right side    Acute on chronic respiratory failure with hypoxemia (HCC)    Acute renal failure superimposed on stage 4 chronic kidney disease (HCC)    Seasonal allergies    Elevated liver enzymes    Hyponatremia    Chronic kidney disease    Acute-on-chronic kidney injury ([address] )    Acute cystitis without hematuria         Past Medical History     Past Medical History:   Diagnosis Date    Allergic     Allergic rhinitis     Anesthesia     pt reports \"had to use double lumen endo tube for hiatal hernia repair/so surgeon could get to where he needed to work\"    Arthritis     Aspiration into airway     [place] esophagus 1993    Lot of stress with children    Basal cell carcinoma 2007    left cheek     BCC (basal cell carcinoma) 05/27/2021    Left Nasal tip    Cancer (HCC)     squamous cell cancer on forhead    Cancer ([facility] )     basal cell on nose     Cholelithiasis     Chronic kidney disease 2000, 2018    Stones, kidney disease stage 4    Chronic pain disorder     bilat feet and joint pain on occas    Colon polyp     Constipation     COPD (chronic obstructive pulmonary disease) ([facility]     COVID-19 08/2021    recovered at home/did receive monoclonal infusion    Dental bridge present     Depression     Diabetes mellitus ([facility] )     Type 2    Diabetic neuropathy (HCC)     Disease of thyroid gland     Family history of thyroid problem     Fatty liver     GERD (gastroesophageal reflux disease)     Heart burn     Hiatal hernia "  History of pneumonia     Hyperlipidemia     Hyperplasia, parathyroid ([address] )     Hypertension     Kidney problem     Kidney stone     Memory loss Julu2 2020    Motion sickness     Motion sickness     Neck pain     Obesity 1978    Obesity (BMI 30 0-34  9)     Pollen allergies     RLS (restless legs syndrome)     SCC (squamous cell carcinoma) 05/04/2021    left mid forehead    Seasonal allergies     Sleep apnea     has inspire    Squamous cell skin cancer 2007    left cheek     Swollen ankles     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, left     great toe fusion    Urinary incontinence     Urinary tract infection 03/28/2022    Wears glasses          Surgical History     Past Surgical History:   Procedure Laterality Date    ABDOMINAL SURGERY  [id number]    Nissen x2 1972 tubal ligarion    ARTHROSCOPY KNEE      BREAST BIOPSY      stereotactic-benign    BREAST BIOPSY      stereo-benign    BREAST EXCISIONAL BIOPSY      unknown date-benign    BREAST EXCISIONAL BIOPSY      unknown date-benign    BREAST EXCISIONAL BIOPSY      unknown date-benign    BREAST EXCISIONAL BIOPSY      unknown age-benign    CARDIAC CATHETERIZATION      CHOLECYSTECTOMY      COLONOSCOPY      EXAMINATION UNDER ANESTHESIA N/A 06/24/2021    Procedure: EXAM UNDER ANESTHESIA (EUA), DISE;  Surgeon: Bret Rivera MD;  [facility];  Service: ENT    HERNIA REPAIR      HIATAL HERNIA REPAIR      KNEE SURGERY      Torn tracey lap surg    LIPOSUCTION      LYMPH NODE BIOPSY      MOHS SURGERY  05/20/2021    left mid forehead-Gautam    MOHS SURGERY Left 05/27/2021    Left nasal tip- gautam     WV LIGATION/BIOPSY TEMPORAL ARTERY Left 01/05/2023    Procedure: BIOPSY ARTERY TEMPORAL;  Surgeon: Ezequiel Reese DO;  [facility];  Service: Vascular    WV OPEN IMPLANTATION CRANIAL NERVE VASQUEZ & PULSE GEN N/A 11/10/2021    Procedure: INSERTION UPPER AIRWAY STIMULATOR, INSPIRE IMPLANT;  Surgeon: Zora Beltre Mary Lucio MD;  [facility];  Service: ENT    ND UNLISTED PROCEDURE FOOT/TOES Right 07/19/2022    Procedure: 1st metatarsal phalangeal joint fusion;  Surgeon: John Terrell DPM;  [facility];  Service: [address] [facility] Bilateral 2000    REDUCTION MAMMAPLASTY      SKIN BIOPSY      SKIN CANCER EXCISION  2007    squamous cell carcinoma     SKIN CANCER EXCISION  2007    basal cell carcinoma    SQUAMOUS CELL CARCINOMA EXCISION      TOE SURGERY Right 08/04/2022    Right Great Toe Fusion    TONSILLECTOMY      TUBAL LIGATION      UPPER GASTROINTESTINAL ENDOSCOPY      US GUIDED BREAST BIOPSY RIGHT COMPLETE Right 07/18/2022         Family History     Family History   Problem Relation Age of Onset    Heart disease Mother     Depression Mother     Hypertension Mother    Ellamae Locus COPD Mother     Hearing loss Mother     Anxiety disorder Mother     Heart disease Father     Lung cancer Father 79        Smoker     Cancer Father         brain    Alcohol abuse Father    Ellamae Locus Dementia Father    Ellamae Locus Hypertension Father     Thyroid disease Father    Ellamae Locus COPD Father     Arthritis Father     Brain cancer Father 76    Hypertension Sister     Diabetes Sister     Heart disease Sister     Thyroid disease Sister     Cancer Sister         Lympoma, lung    Lung cancer Sister 78    Anxiety disorder Sister     Hypertension Brother     Diabetes Brother     Cancer Brother         Throat    Dementia Brother     Stroke Brother     Hypertension Brother     Heart disease Brother     Diabetes Brother     Hypertension Brother     No Known Problems Son     No Known Problems Son     Brain cancer Paternal [de-identified]         unknown age   Ellamae Locus Diabetes Sister     Hypertension Sister     Diabetes Brother     Breast cancer Neg Hx          Social History     Social History       Radiology

## 2023-05-06 LAB
BACTERIA UR CULT: ABNORMAL
BACTERIA UR CULT: ABNORMAL

## 2023-05-08 ENCOUNTER — TELEPHONE (OUTPATIENT)
Dept: UROLOGY | Facility: CLINIC | Age: 73
End: 2023-05-08

## 2023-05-08 DIAGNOSIS — N39.0 RECURRENT UTI: Primary | ICD-10-CM

## 2023-05-08 RX ORDER — DOXYCYCLINE 100 MG/1
100 CAPSULE ORAL 2 TIMES DAILY
Qty: 10 CAPSULE | Refills: 0 | Status: SHIPPED | OUTPATIENT
Start: 2023-05-08 | End: 2023-05-13

## 2023-05-08 NOTE — TELEPHONE ENCOUNTER
----- Message from Vivienne Schuler PA-C sent at 5/8/2023  9:03 AM EDT -----  Please let the patient know keflex was resistant- stop kelfex.  different antibiotic sent

## 2023-05-09 ENCOUNTER — OFFICE VISIT (OUTPATIENT)
Dept: INTERNAL MEDICINE CLINIC | Facility: CLINIC | Age: 73
End: 2023-05-09

## 2023-05-09 VITALS
TEMPERATURE: 97.4 F | DIASTOLIC BLOOD PRESSURE: 78 MMHG | HEIGHT: 64 IN | RESPIRATION RATE: 20 BRPM | HEART RATE: 57 BPM | WEIGHT: 174 LBS | SYSTOLIC BLOOD PRESSURE: 142 MMHG | BODY MASS INDEX: 29.71 KG/M2 | OXYGEN SATURATION: 100 %

## 2023-05-09 DIAGNOSIS — N17.9 ACUTE RENAL FAILURE SUPERIMPOSED ON CHRONIC KIDNEY DISEASE, UNSPECIFIED CKD STAGE, UNSPECIFIED ACUTE RENAL FAILURE TYPE (HCC): ICD-10-CM

## 2023-05-09 DIAGNOSIS — N17.9 AKI (ACUTE KIDNEY INJURY) (HCC): Primary | ICD-10-CM

## 2023-05-09 DIAGNOSIS — K31.84 GASTROPARESIS DIABETICORUM (HCC): ICD-10-CM

## 2023-05-09 DIAGNOSIS — Z79.4 INSULIN DEPENDENT TYPE 2 DIABETES MELLITUS (HCC): ICD-10-CM

## 2023-05-09 DIAGNOSIS — N18.9 ACUTE RENAL FAILURE SUPERIMPOSED ON CHRONIC KIDNEY DISEASE, UNSPECIFIED CKD STAGE, UNSPECIFIED ACUTE RENAL FAILURE TYPE (HCC): ICD-10-CM

## 2023-05-09 DIAGNOSIS — R33.9 URINARY RETENTION: ICD-10-CM

## 2023-05-09 DIAGNOSIS — E11.9 INSULIN DEPENDENT TYPE 2 DIABETES MELLITUS (HCC): ICD-10-CM

## 2023-05-09 DIAGNOSIS — S02.2XXD CLOSED FRACTURE OF NASAL BONE WITH ROUTINE HEALING, SUBSEQUENT ENCOUNTER: ICD-10-CM

## 2023-05-09 DIAGNOSIS — I10 ESSENTIAL HYPERTENSION: ICD-10-CM

## 2023-05-09 DIAGNOSIS — N18.4 STAGE 4 CHRONIC KIDNEY DISEASE (HCC): ICD-10-CM

## 2023-05-09 DIAGNOSIS — E11.43 GASTROPARESIS DIABETICORUM (HCC): ICD-10-CM

## 2023-05-09 DIAGNOSIS — J44.9 CHRONIC OBSTRUCTIVE PULMONARY DISEASE, UNSPECIFIED COPD TYPE (HCC): Chronic | ICD-10-CM

## 2023-05-09 PROBLEM — R51.9 HEADACHE: Status: RESOLVED | Noted: 2023-01-03 | Resolved: 2023-05-09

## 2023-05-09 PROBLEM — E11.21 DIABETIC NEPHROPATHY ASSOCIATED WITH TYPE 2 DIABETES MELLITUS (HCC): Status: RESOLVED | Noted: 2022-02-06 | Resolved: 2023-05-09

## 2023-05-09 PROBLEM — D63.1 ANEMIA IN STAGE 4 CHRONIC KIDNEY DISEASE (HCC): Status: RESOLVED | Noted: 2021-04-06 | Resolved: 2023-05-09

## 2023-05-09 PROBLEM — I95.9 HYPOTENSION: Status: RESOLVED | Noted: 2022-04-13 | Resolved: 2023-05-09

## 2023-05-09 PROBLEM — N30.00 ACUTE CYSTITIS WITHOUT HEMATURIA: Status: RESOLVED | Noted: 2023-04-28 | Resolved: 2023-05-09

## 2023-05-09 PROBLEM — D63.1 ANEMIA IN STAGE 4 CHRONIC KIDNEY DISEASE: Status: RESOLVED | Noted: 2021-04-06 | Resolved: 2023-05-09

## 2023-05-09 PROBLEM — I12.9 HYPERTENSIVE CHRONIC KIDNEY DISEASE WITH STAGE 1 THROUGH STAGE 4 CHRONIC KIDNEY DISEASE, OR UNSPECIFIED CHRONIC KIDNEY DISEASE: Status: RESOLVED | Noted: 2018-09-19 | Resolved: 2023-05-09

## 2023-05-09 PROBLEM — J96.21 ACUTE ON CHRONIC RESPIRATORY FAILURE WITH HYPOXEMIA (HCC): Status: RESOLVED | Noted: 2023-03-25 | Resolved: 2023-05-09

## 2023-05-09 PROBLEM — D63.1 ANEMIA OF CHRONIC RENAL FAILURE, STAGE 3 (MODERATE) (HCC): Status: RESOLVED | Noted: 2020-05-08 | Resolved: 2023-05-09

## 2023-05-09 PROBLEM — N18.30 ANEMIA OF CHRONIC RENAL FAILURE, STAGE 3 (MODERATE): Status: RESOLVED | Noted: 2020-05-08 | Resolved: 2023-05-09

## 2023-05-09 PROBLEM — J96.01 ACUTE RESPIRATORY FAILURE WITH HYPOXIA (HCC): Status: RESOLVED | Noted: 2020-05-02 | Resolved: 2023-05-09

## 2023-05-09 PROBLEM — J96.11 CHRONIC RESPIRATORY FAILURE WITH HYPOXIA (HCC): Status: RESOLVED | Noted: 2021-04-01 | Resolved: 2023-05-09

## 2023-05-09 PROBLEM — D63.1 ANEMIA OF CHRONIC RENAL FAILURE, STAGE 3 (MODERATE): Status: RESOLVED | Noted: 2020-05-08 | Resolved: 2023-05-09

## 2023-05-09 PROBLEM — N18.30 ANEMIA OF CHRONIC RENAL FAILURE, STAGE 3 (MODERATE) (HCC): Status: RESOLVED | Noted: 2020-05-08 | Resolved: 2023-05-09

## 2023-05-09 PROBLEM — R94.5 LIVER FUNCTION ABNORMALITY: Status: RESOLVED | Noted: 2023-02-09 | Resolved: 2023-05-09

## 2023-05-09 RX ORDER — METOPROLOL TARTRATE 50 MG/1
25 TABLET, FILM COATED ORAL EVERY 12 HOURS SCHEDULED
Qty: 180 TABLET | Refills: 3 | Status: SHIPPED | OUTPATIENT
Start: 2023-05-09

## 2023-05-09 RX ORDER — TORSEMIDE 10 MG/1
10 TABLET ORAL DAILY
Qty: 30 TABLET | Refills: 5 | Status: SHIPPED | OUTPATIENT
Start: 2023-05-09

## 2023-05-09 RX ORDER — OLMESARTAN MEDOXOMIL 20 MG/1
20 TABLET ORAL DAILY
Qty: 30 TABLET | Refills: 5 | Status: SHIPPED | OUTPATIENT
Start: 2023-05-09

## 2023-05-09 RX ORDER — ICOSAPENT ETHYL 1000 MG/1
1 CAPSULE ORAL 2 TIMES DAILY
COMMUNITY
Start: 2023-04-04

## 2023-05-09 RX ORDER — LEVOTHYROXINE SODIUM 112 UG/1
112 TABLET ORAL EVERY MORNING
COMMUNITY
Start: 2023-04-29

## 2023-05-09 NOTE — ASSESSMENT & PLAN NOTE
Lab Results   Component Value Date    HGBA1C 6 8 (H) 03/07/2023   Pt is using Dexcom  Daily readings are averaging 100-110, pt admits that evening bs 160-180 however she admits intermittent diet non compliance  C/w levemir 50 U HS  C/w aspart 16/16/25 U premeal TID  Follow diabetic diet  C/w gabapentin 300 mg HS   C/w duloxetine 60 mg daily

## 2023-05-09 NOTE — ASSESSMENT & PLAN NOTE
Decrease dose of Metoprolol to 25 mg BID to avoid bradycardia and to have ability to add diuretic and ARB's for proteinuria and kidney protection  · Start taking Olmesartan 20 mg HS  · Start taking Torsemide 10 mg daily  · Repeat BMP in 1 weak

## 2023-05-09 NOTE — ASSESSMENT & PLAN NOTE
Pt's creatinine base line below 2  Recent admission with creatinine pick 3 5 decreased to 2 5 mg on dc  Diuretics and ARB's were held in during the hospital stay  Today will restart diuretics/ARB's in lower doses given recent weight gain and present proteinuria due to DM kidney nephropathy  Metoprolol dose decreased, see details as above    · Start taking Olmesartan 20 mg HS  · Start taking Torsemide 10 mg daily  · Repeat BMP in 1 weak  · Will sent message to nephrology regarding current adjustment Adequate: hears normal conversation without difficulty

## 2023-05-09 NOTE — ASSESSMENT & PLAN NOTE
Hx of recurrent UTI  Most recent Urine culture 5/4 grew Enterobacter Cloace, resistance panel was appreciated and pt was started on Doxycycline with discontinuation of keflex   FYI: During the recent admission pt's Urine culture grew E  Coli, Pt was treated with Ceftriaxone and switched to Keflex on DC

## 2023-05-09 NOTE — ASSESSMENT & PLAN NOTE
Lab Results   Component Value Date    HGBA1C 6 8 (H) 03/07/2023   C/w diabetic diet  C/w Protonix 40 mg BID

## 2023-05-09 NOTE — ASSESSMENT & PLAN NOTE
Pt is following with urology  Intraparenchymal non obstructive kidney stones  C/w sraight cath daily, follow with urology prn

## 2023-05-09 NOTE — PROGRESS NOTES
Assessment/Plan:    Gastroparesis diabeticorum (HCC)    Lab Results   Component Value Date    HGBA1C 6 8 (H) 03/07/2023   C/w diabetic diet  C/w Protonix 40 mg BID      Insulin dependent type 2 diabetes mellitus ([facility] )    Lab Results   Component Value Date    HGBA1C 6 8 (H) 03/07/2023   Pt is using Dexcom  Daily readings are averaging 100-110, pt admits that evening bs 160-180 however she admits intermittent diet non compliance  C/w levemir 50 U HS  C/w aspart 16/16/25 U premeal TID  Follow diabetic diet  C/w gabapentin 300 mg HS   C/w duloxetine 60 mg daily     Essential hypertension  Decrease dose of Metoprolol to 25 mg BID to avoid bradycardia and to have ability to add diuretic and ARB's for proteinuria and kidney protection  · Start taking Olmesartan 20 mg HS  · Start taking Torsemide 10 mg daily  · Repeat BMP in 1 weak    UTI (urinary tract infection)  Hx of recurrent UTI  Most recent Urine culture 5/4 grew Enterobacter Cloace, resistance panel was appreciated and pt was started on Doxycycline with discontinuation of keflex   FYI: During the recent admission pt's Urine culture grew E  Coli, Pt was treated with Ceftriaxone and switched to Keflex on DC      COPD (chronic obstructive pulmonary disease) ([facility])  Not in exacerbation, lungs are clear  C/w albuterol inhaler prn    Acute-on-chronic kidney injury [facility])  Lab Results   Component Value Date    EGFR 25 04/28/2023    EGFR 18 04/27/2023    EGFR 12 04/26/2023    CREATININE 1 90 (H) 04/28/2023    CREATININE 2 51 (H) 04/27/2023    CREATININE 3 54 (H) 04/26/2023   Pt is following with Dr Janel Rosen, will notify him about meds adjustment     ANDRA (acute kidney injury) ([facility] )  Pt's creatinine base line below 2  Recent admission with creatinine pick 3 5 decreased to 2 5 mg on dc  Diuretics and ARB's were held in during the hospital stay  Today will restart diuretics/ARB's in lower doses given recent weight gain and present proteinuria due to DM kidney nephropathy  Metoprolol dose decreased, see details as above    · Start taking Olmesartan 20 mg HS  · Start taking Torsemide 10 mg daily  · Repeat BMP in 1 weak  · Will sent message to nephrology regarding current adjustment      Nasal bones, closed fracture  Pt c/o nubness in her upper lip and nose after fall in march  Tenderness in nasal bridge area and right infraorbital area  S/p fracture in march  On CT facial bone 3/23/23: Prominently displaced fracture of the inferior wall of the right orbit without intraorbital muscular entrapment  Acute mildly displaced bilateral nasal bone fractures and increased leftward nasal septal deviation  Pt was followed by trauma and maxillofacial surgery, no further interventions or follow up were recommended     · Will call Familia Denton to clarify if any recommendations would be suggested      Urinary retention  Pt is following with urology  Intraparenchymal non obstructive kidney stones  C/w sraight cath daily, follow with urology prn    Acute renal failure superimposed on stage 4 chronic kidney disease [facility])  Lab Results   Component Value Date    EGFR 25 04/28/2023    EGFR 18 04/27/2023    EGFR 12 04/26/2023    CREATININE 1 90 (H) 04/28/2023    CREATININE 2 51 (H) 04/27/2023    CREATININE 3 54 (H) 04/26/2023        Diagnoses and all orders for this visit:    ANDRA (acute kidney injury) ([facility] )  -     Basic metabolic panel; Future    Essential hypertension  -     metoprolol tartrate (LOPRESSOR) 50 mg tablet; Take 0 5 tablets (25 mg total) by mouth every 12 (twelve) hours  -     torsemide (DEMADEX) 10 mg tablet; Take 1 tablet (10 mg total) by mouth daily  -     olmesartan (BENICAR) 20 mg tablet; Take 1 tablet (20 mg total) by mouth daily  -     Basic metabolic panel; Future    Other orders  -     levothyroxine 112 mcg tablet; Take 112 mcg by mouth every morning  -     Icosapent Ethyl 1 g CAPS; Take 1 capsule by mouth 2 (two) times a day        Subjective:      Patient ID: Taina Marie is a 75 y o  female  A 75years old female with past medical history of hypertension, hyperlipidemia, obesity, COPD, BARBARA, DM 2 with neuropathy, anemia, CKD IV syndrome, hyponatremia, recurrent UTI with intraparenchymal kidney stone since today to establish care and transition of care after recent hospitalization  Today pt is concerned regarding her weight gain (around 5 lbs in the last 2 weeks) and elevated blood pressure which she thinks is due to her meds were recently discontinued  Also, pt is concerned about tenderness and numbness on her face which remains unchanged after fall in march  Patient had recent admission to the hospital due to confusion, UTI, ANDRA  Patient was treated with IVF hydration, antibiotics (ceftriaxone and discharged on Keflex), torsemide, spironolactone and ARB's were placed on hold  Recently seen by urology antibiotic was switched to doxycycline as most recent Urine culture grew Enterobacter Cloacae with resistance to Keflex  Additionally, patient was placed on estrace 3 /weekly and daily straight cath was recommended  Creatinine baseline is below 2  Most recent BMP with creatinine 1 9  Will adjust medications today and will notify nephrology  Additionally, will get in touch with maxillofacial surgery to see if any recommendations suggested  The following portions of the patient's history were reviewed and updated as appropriate: allergies, current medications, past family history, past medical history, past social history, past surgical history and problem list     Review of Systems   Constitutional: Positive for fatigue and unexpected weight change  HENT: Negative  Respiratory:        Residual cough after previous URI   Cardiovascular:        Leg tightness   Gastrointestinal: Negative  Genitourinary:        Urinary retention   Skin: Negative  Neurological: Negative  Psychiatric/Behavioral: Negative            Objective:      /78 (BP Location: Right arm, Patient "Position: Sitting, Cuff Size: Large)   Pulse 57   Temp (!) 97 4 °F (36 3 °C)   Resp 20   Ht 5' 4\" (1 626 m)   Wt 78 9 kg (174 lb)   SpO2 100%   BMI 29 87 kg/m²          Physical Exam  Constitutional:       General: She is not in acute distress  Appearance: She is obese  She is not ill-appearing, toxic-appearing or diaphoretic  HENT:      Head: Normocephalic  Nose:      Comments: Nose tenderness, predominantly in the nose bridge area and right infraorbital area     Mouth/Throat:      Mouth: Mucous membranes are moist    Eyes:      Conjunctiva/sclera: Conjunctivae normal    Cardiovascular:      Rate and Rhythm: Regular rhythm  Bradycardia present  Pulmonary:      Effort: No respiratory distress  Breath sounds: No stridor  No wheezing, rhonchi or rales  Chest:      Chest wall: No tenderness  Abdominal:      General: There is no distension  Tenderness: There is no abdominal tenderness  There is no right CVA tenderness, left CVA tenderness, guarding or rebound  Musculoskeletal:      Comments: Minimal pitting edema +1 b/l   Skin:     General: Skin is warm  Neurological:      General: No focal deficit present  Mental Status: She is alert and oriented to person, place, and time  Mental status is at baseline  Cranial Nerves: No cranial nerve deficit  Motor: No weakness  Psychiatric:         Mood and Affect: Mood normal          Behavior: Behavior normal          Thought Content:  Thought content normal          Judgment: Judgment normal          "

## 2023-05-09 NOTE — ASSESSMENT & PLAN NOTE
Lab Results   Component Value Date    EGFR 25 04/28/2023    EGFR 18 04/27/2023    EGFR 12 04/26/2023    CREATININE 1 90 (H) 04/28/2023    CREATININE 2 51 (H) 04/27/2023    CREATININE 3 54 (H) 04/26/2023   Pt is following with Dr Nallely Freitas, will notify him about meds adjustment

## 2023-05-09 NOTE — ASSESSMENT & PLAN NOTE
Lab Results   Component Value Date    EGFR 25 04/28/2023    EGFR 18 04/27/2023    EGFR 12 04/26/2023    CREATININE 1 90 (H) 04/28/2023    CREATININE 2 51 (H) 04/27/2023    CREATININE 3 54 (H) 04/26/2023

## 2023-05-09 NOTE — ASSESSMENT & PLAN NOTE
Pt c/o nubness in her upper lip and nose after fall in march  Tenderness in nasal bridge area and right infraorbital area  S/p fracture in march  On CT facial bone 3/23/23: Prominently displaced fracture of the inferior wall of the right orbit without intraorbital muscular entrapment  Acute mildly displaced bilateral nasal bone fractures and increased leftward nasal septal deviation    Pt was followed by trauma and maxillofacial surgery, no further interventions or follow up were recommended     · Will call Jak Calix to clarify if any recommendations would be suggested

## 2023-05-09 NOTE — ASSESSMENT & PLAN NOTE
Pt's creatinine base line below 2  Recent admission with creatinine pick 3 5 decreased to 2 5 mg on dc  Diuretics and ARB's were held in during the hospital stay  Today will restart diuretics/ARB's in lower doses given recent weight gain and present proteinuria due to DM kidney nephropathy  Metoprolol dose decreased, see details as above    · Start taking Olmesartan 20 mg HS  · Start taking Torsemide 10 mg daily  · Repeat BMP in 1 weak  · Will sent message to nephrology regarding current adjustment

## 2023-05-16 ENCOUNTER — TELEPHONE (OUTPATIENT)
Dept: OTHER | Facility: OTHER | Age: 73
End: 2023-05-16

## 2023-05-16 ENCOUNTER — APPOINTMENT (OUTPATIENT)
Dept: LAB | Facility: AMBULARY SURGERY CENTER | Age: 73
End: 2023-05-16

## 2023-05-16 DIAGNOSIS — M81.0 SENILE OSTEOPOROSIS: ICD-10-CM

## 2023-05-16 DIAGNOSIS — I10 ESSENTIAL HYPERTENSION: ICD-10-CM

## 2023-05-16 DIAGNOSIS — N17.9 AKI (ACUTE KIDNEY INJURY) (HCC): ICD-10-CM

## 2023-05-16 LAB
ANION GAP SERPL CALCULATED.3IONS-SCNC: 2 MMOL/L (ref 4–13)
BUN SERPL-MCNC: 46 MG/DL (ref 5–25)
CA-I BLD-SCNC: 1.15 MMOL/L (ref 1.12–1.32)
CALCIUM SERPL-MCNC: 9.1 MG/DL (ref 8.3–10.1)
CHLORIDE SERPL-SCNC: 105 MMOL/L (ref 96–108)
CO2 SERPL-SCNC: 25 MMOL/L (ref 21–32)
CREAT SERPL-MCNC: 2.1 MG/DL (ref 0.6–1.3)
GFR SERPL CREATININE-BSD FRML MDRD: 22 ML/MIN/1.73SQ M
GLUCOSE P FAST SERPL-MCNC: 184 MG/DL (ref 65–99)
POTASSIUM SERPL-SCNC: 4.2 MMOL/L (ref 3.5–5.3)
SODIUM SERPL-SCNC: 132 MMOL/L (ref 135–147)

## 2023-05-16 NOTE — TELEPHONE ENCOUNTER
Patient called to see if there were any openings sooner than her appointment in June  She would like a call back to advise

## 2023-05-18 DIAGNOSIS — I12.9 HYPERTENSIVE CHRONIC KIDNEY DISEASE WITH STAGE 1 THROUGH STAGE 4 CHRONIC KIDNEY DISEASE, OR UNSPECIFIED CHRONIC KIDNEY DISEASE: ICD-10-CM

## 2023-05-18 DIAGNOSIS — I10 ESSENTIAL HYPERTENSION: ICD-10-CM

## 2023-05-18 DIAGNOSIS — N18.30 CHRONIC KIDNEY DISEASE, STAGE III (MODERATE) (HCC): ICD-10-CM

## 2023-05-18 RX ORDER — AMLODIPINE BESYLATE 5 MG/1
5 TABLET ORAL DAILY
Qty: 90 TABLET | Refills: 0 | Status: SHIPPED | OUTPATIENT
Start: 2023-05-18 | End: 2023-05-24 | Stop reason: SDUPTHER

## 2023-05-23 ENCOUNTER — APPOINTMENT (OUTPATIENT)
Dept: LAB | Facility: AMBULARY SURGERY CENTER | Age: 73
End: 2023-05-23

## 2023-05-23 DIAGNOSIS — E11.21 DIABETIC NEPHROPATHY ASSOCIATED WITH TYPE 2 DIABETES MELLITUS (HCC): ICD-10-CM

## 2023-05-23 DIAGNOSIS — N18.4 STAGE 4 CHRONIC KIDNEY DISEASE (HCC): ICD-10-CM

## 2023-05-23 DIAGNOSIS — N18.4 ANEMIA IN STAGE 4 CHRONIC KIDNEY DISEASE (HCC): ICD-10-CM

## 2023-05-23 DIAGNOSIS — E78.5 DYSLIPIDEMIA: ICD-10-CM

## 2023-05-23 DIAGNOSIS — N39.0 RECURRENT UTI: ICD-10-CM

## 2023-05-23 DIAGNOSIS — I27.20 PULMONARY HYPERTENSION (HCC): ICD-10-CM

## 2023-05-23 DIAGNOSIS — E55.9 VITAMIN D DEFICIENCY: ICD-10-CM

## 2023-05-23 DIAGNOSIS — R94.5 LIVER FUNCTION ABNORMALITY: ICD-10-CM

## 2023-05-23 DIAGNOSIS — N17.9 AKI (ACUTE KIDNEY INJURY) (HCC): ICD-10-CM

## 2023-05-23 DIAGNOSIS — I12.9 HYPERTENSIVE CHRONIC KIDNEY DISEASE WITH STAGE 1 THROUGH STAGE 4 CHRONIC KIDNEY DISEASE, OR UNSPECIFIED CHRONIC KIDNEY DISEASE: ICD-10-CM

## 2023-05-23 DIAGNOSIS — R80.1 PERSISTENT PROTEINURIA: ICD-10-CM

## 2023-05-23 DIAGNOSIS — D63.1 ANEMIA IN STAGE 4 CHRONIC KIDNEY DISEASE (HCC): ICD-10-CM

## 2023-05-23 LAB
25(OH)D3 SERPL-MCNC: 22.9 NG/ML (ref 30–100)
ALBUMIN SERPL BCP-MCNC: 3.2 G/DL (ref 3.5–5)
ALP SERPL-CCNC: 65 U/L (ref 46–116)
ALT SERPL W P-5'-P-CCNC: 54 U/L (ref 12–78)
ANION GAP SERPL CALCULATED.3IONS-SCNC: -1 MMOL/L (ref 4–13)
AST SERPL W P-5'-P-CCNC: 19 U/L (ref 5–45)
BILIRUB SERPL-MCNC: 0.27 MG/DL (ref 0.2–1)
BUN SERPL-MCNC: 41 MG/DL (ref 5–25)
CALCIUM ALBUM COR SERPL-MCNC: 9.7 MG/DL (ref 8.3–10.1)
CALCIUM SERPL-MCNC: 9.1 MG/DL (ref 8.3–10.1)
CHLORIDE SERPL-SCNC: 102 MMOL/L (ref 96–108)
CHOLEST SERPL-MCNC: 279 MG/DL
CK SERPL-CCNC: 27 U/L (ref 26–192)
CO2 SERPL-SCNC: 31 MMOL/L (ref 21–32)
CREAT SERPL-MCNC: 1.94 MG/DL (ref 0.6–1.3)
CREAT UR-MCNC: 70.9 MG/DL
ERYTHROCYTE [DISTWIDTH] IN BLOOD BY AUTOMATED COUNT: 14.5 % (ref 11.6–15.1)
GFR SERPL CREATININE-BSD FRML MDRD: 24 ML/MIN/1.73SQ M
GLUCOSE P FAST SERPL-MCNC: 200 MG/DL (ref 65–99)
HCT VFR BLD AUTO: 37.9 % (ref 34.8–46.1)
HDLC SERPL-MCNC: 50 MG/DL
HGB BLD-MCNC: 11.8 G/DL (ref 11.5–15.4)
LDLC SERPL DIRECT ASSAY-MCNC: 87 MG/DL (ref 0–100)
MAGNESIUM SERPL-MCNC: 2.4 MG/DL (ref 1.6–2.6)
MCH RBC QN AUTO: 28.4 PG (ref 26.8–34.3)
MCHC RBC AUTO-ENTMCNC: 31.1 G/DL (ref 31.4–37.4)
MCV RBC AUTO: 91 FL (ref 82–98)
PHOSPHATE SERPL-MCNC: 3.2 MG/DL (ref 2.3–4.1)
PLATELET # BLD AUTO: 301 THOUSANDS/UL (ref 149–390)
PMV BLD AUTO: 11.3 FL (ref 8.9–12.7)
POTASSIUM SERPL-SCNC: 4.4 MMOL/L (ref 3.5–5.3)
PROT SERPL-MCNC: 6.6 G/DL (ref 6.4–8.4)
PROT UR-MCNC: 150 MG/DL
PROT/CREAT UR: 2.12 MG/G{CREAT} (ref 0–0.1)
PTH-INTACT SERPL-MCNC: 71.3 PG/ML (ref 12–88)
RBC # BLD AUTO: 4.15 MILLION/UL (ref 3.81–5.12)
SODIUM SERPL-SCNC: 132 MMOL/L (ref 135–147)
TRIGL SERPL-MCNC: 641 MG/DL
WBC # BLD AUTO: 12.21 THOUSAND/UL (ref 4.31–10.16)

## 2023-05-24 ENCOUNTER — OFFICE VISIT (OUTPATIENT)
Dept: INTERNAL MEDICINE CLINIC | Facility: CLINIC | Age: 73
End: 2023-05-24

## 2023-05-24 VITALS
HEIGHT: 64 IN | WEIGHT: 174.4 LBS | TEMPERATURE: 97.8 F | SYSTOLIC BLOOD PRESSURE: 140 MMHG | OXYGEN SATURATION: 93 % | BODY MASS INDEX: 29.78 KG/M2 | DIASTOLIC BLOOD PRESSURE: 80 MMHG | HEART RATE: 68 BPM

## 2023-05-24 DIAGNOSIS — E78.00 ELEVATED CHOLESTEROL: ICD-10-CM

## 2023-05-24 DIAGNOSIS — E55.9 VITAMIN D DEFICIENCY: ICD-10-CM

## 2023-05-24 DIAGNOSIS — M81.0 OSTEOPOROSIS WITHOUT CURRENT PATHOLOGICAL FRACTURE, UNSPECIFIED OSTEOPOROSIS TYPE: ICD-10-CM

## 2023-05-24 DIAGNOSIS — Z79.4 TYPE 2 DIABETES MELLITUS WITH CHRONIC KIDNEY DISEASE, WITH LONG-TERM CURRENT USE OF INSULIN, UNSPECIFIED CKD STAGE (HCC): ICD-10-CM

## 2023-05-24 DIAGNOSIS — N30.00 ACUTE CYSTITIS WITHOUT HEMATURIA: ICD-10-CM

## 2023-05-24 DIAGNOSIS — E78.5 DYSLIPIDEMIA: ICD-10-CM

## 2023-05-24 DIAGNOSIS — E11.22 TYPE 2 DIABETES MELLITUS WITH CHRONIC KIDNEY DISEASE, WITH LONG-TERM CURRENT USE OF INSULIN, UNSPECIFIED CKD STAGE (HCC): ICD-10-CM

## 2023-05-24 DIAGNOSIS — I10 ESSENTIAL HYPERTENSION: Primary | ICD-10-CM

## 2023-05-24 DIAGNOSIS — N18.4 STAGE 4 CHRONIC KIDNEY DISEASE (HCC): Primary | ICD-10-CM

## 2023-05-24 DIAGNOSIS — Z12.31 ENCOUNTER FOR SCREENING MAMMOGRAM FOR MALIGNANT NEOPLASM OF BREAST: ICD-10-CM

## 2023-05-24 RX ORDER — ERGOCALCIFEROL 1.25 MG/1
50000 CAPSULE ORAL WEEKLY
Qty: 8 CAPSULE | Refills: 0 | Status: SHIPPED | OUTPATIENT
Start: 2023-05-24

## 2023-05-24 RX ORDER — TORSEMIDE 10 MG/1
5 TABLET ORAL DAILY
Qty: 30 TABLET | Refills: 1 | Status: SHIPPED | OUTPATIENT
Start: 2023-05-24 | End: 2023-09-21

## 2023-05-24 RX ORDER — OLMESARTAN MEDOXOMIL 40 MG/1
40 TABLET ORAL DAILY
Qty: 30 TABLET | Refills: 1 | Status: SHIPPED | OUTPATIENT
Start: 2023-05-24 | End: 2023-07-23

## 2023-05-24 RX ORDER — CRANBERRY FRUIT EXTRACT 200 MG
200 CAPSULE ORAL 3 TIMES DAILY
COMMUNITY

## 2023-05-24 RX ORDER — AMLODIPINE BESYLATE 5 MG/1
5 TABLET ORAL DAILY
Qty: 90 TABLET | Refills: 1 | Status: SHIPPED | OUTPATIENT
Start: 2023-05-24

## 2023-05-24 RX ORDER — ROSUVASTATIN CALCIUM 5 MG/1
5 TABLET, COATED ORAL DAILY
Qty: 90 TABLET | Refills: 3 | Status: SHIPPED | OUTPATIENT
Start: 2023-05-24

## 2023-05-24 RX ORDER — CEPHALEXIN 250 MG/1
250 CAPSULE ORAL EVERY 6 HOURS SCHEDULED
Qty: 20 CAPSULE | Refills: 0 | Status: SHIPPED | OUTPATIENT
Start: 2023-05-24 | End: 2023-05-29

## 2023-05-24 NOTE — ASSESSMENT & PLAN NOTE
· Hx recurrent UTIs  · Last culture 5/4 grew Enterobacter Cloace and patient completed course of doxycycline  · Currently in clinic endorsing dysuria, urgency, frequency  · No fevers or chills  · Urine culture from yesterday came back showing E coli >100k CFUs    Plan:  · Will give Keflex 250mg q6hrs for 5 days

## 2023-05-24 NOTE — TELEPHONE ENCOUNTER
Spoke to pt, went over results and recommendations  Pt wanted Dr Nadine Nova to know that she had gout and was prescribed prednisone, not sure it that can affect cholesterol and triglycerides  Also mentioned that he had tried her on Crestor in the past and was stopped due to elevated CK level, however she is willing to try it again  As for the fish oil, she is on Icosapent Ethyl 1 g bid  Agreeable with starting Vit D 50 000 iu weekly x 8 weeks  Lab orders in the system  Med list reviewed and up to date

## 2023-05-24 NOTE — ASSESSMENT & PLAN NOTE
Lab Results   Component Value Date    HGBA1C 6 8 (H) 03/07/2023   Blood sugars appropriate, will continue to monitor  Encouraged lifestyle modifications  Home regimen: Levemir 50units HS, Aspart 16units breakfas/lunch, 25units with dinner    Plan:  Continue outpatient regimen

## 2023-05-24 NOTE — PROGRESS NOTES
INTERNAL MEDICINE FOLLOW-UP OFFICE VISIT  [facility] INTERNAL MEDICINE [facility]    NAME: Perlita Fuentes  AGE: 75 y o  SEX: female  : 1950     DATE: 2023     Assessment and Plan:     1  Essential hypertension  Assessment & Plan:  /80 in the office  Reviewed BP log, seems to be in 140s-160s at home    Plan:  · Will increase Olmesartan to 40mg daily  · Decreased dose of Torsemide to 5mg daily  · BMP in 1 month  · F/u in 1 month    Orders:  -     olmesartan (BENICAR) 40 mg tablet; Take 1 tablet (40 mg total) by mouth daily  -     torsemide (DEMADEX) 10 mg tablet; Take 0 5 tablets (5 mg total) by mouth daily  -     Basic metabolic panel; Future; Expected date: 2023  -     Lipid Panel with Direct LDL reflex; Future; Expected date: 2023  -     amLODIPine (NORVASC) 5 mg tablet; Take 1 tablet (5 mg total) by mouth daily    2  Encounter for screening mammogram for malignant neoplasm of breast  -     Mammo screening bilateral w 3d & cad; Future; Expected date: 2023    3  Dyslipidemia  Assessment & Plan:  · Lipids panel shows TAGs in the 600s, total cholesterol 280, normal LDL  · Patient took 3 days prednisone for gout last week  · States she did fasting for lipid panel    Plan:  · Will recheck lipid panel and reassess  · Continue omega 3s  · Dr Bronson Andrews called patient today and started Crestor 5mg daily    Orders:  -     Lipid Panel with Direct LDL reflex; Future; Expected date: 2023    4  Type 2 diabetes mellitus with chronic kidney disease, with long-term current use of insulin, unspecified CKD stage [facility])  Assessment & Plan:    Lab Results   Component Value Date    HGBA1C 6 8 (H) 2023   Blood sugars appropriate, will continue to monitor  Encouraged lifestyle modifications  Home regimen: Levemir 50units HS, Aspart 16units breakfas/lunch, 25units with dinner    Plan:  Continue outpatient regimen        5   Osteoporosis without current pathological fracture, unspecified osteoporosis type  Assessment & Plan:  · Follows with Rheumatology  · Gets Prolia injections every 6 months  · Last injection in April 2023    Plan:  Continue seeing Rheumatology for Prolia injections  Continue Vitamin D supplementation and Calcium      6  Acute cystitis without hematuria  Assessment & Plan:  · Hx recurrent UTIs  · Last culture 5/4 grew Enterobacter Cloace and patient completed course of doxycycline  · Currently in clinic endorsing dysuria, urgency, frequency  · No fevers or chills  · Urine culture from yesterday came back showing E coli >100k CFUs    Plan:  · Will give Keflex 250mg q6hrs for 5 days    Orders:  -     cephalexin (KEFLEX) 250 mg capsule; Take 1 capsule (250 mg total) by mouth every 6 (six) hours for 5 days      Return in about 2 months (around 7/24/2023) for Recheck  Chief Complaint:     Chief Complaint   Patient presents with   • Medicare Wellness Visit   • Follow-up     2 week follow up        History of Present Illness:     Claire cartwright is a 75year old female with past medical hx of HTN, HLD, DM2 with neuropathy and gastroparesis, CKD4, Hypothyroidism, COPD, pulmonary HTN, recurrent UTIs d/t urinary retention from neurogenic bladder requiring self cath daily, fibromyalgia presenting to the clinic for follow up  Patient currently endorses dysuria, urinary frequency and urgency  Denies any fevers, chills, back pain  She had a prior hospitalization a few weeks ago for an ANDRA due to urinary retention and at the time was found to have urine culture growing Enterococcus  She completed treatment with Doxycycline and her symptoms had improved afterwards  She currently still does the daily self caths and has had good urine output  Her blood pressure logs show pressures in the 140s-160s at home  She was started on Olmesartan 20mg and Torsemide 10mg on her last PCP visit  Her kidney function has continued to improve and is back to baseline   She is to follow up with Dr Rodolfo Bass from Nephro in June 2023  Patient endorses that her blood sugars have been good at home in the 150-200 range and she states that she sees an Ophthalmologist regularly and has recently done an eye exam     Patient follows with Rheumatology for her fibromyalgia and osteoporosis  She states that her pain is controlled with just the Gabapentin at night time and tylenol/heating pads throughout the day  She also received Prolia injections every 6 months for osteoporosis and last injection was in April 2023  Patient recently had a gout flare up last week and was given a 3 day course of Prednisone 20mg which she completed  She states that she did a fsting lipid panel and blood work where her lipids were in the 600s  The following portions of the patient's history were reviewed and updated as appropriate: allergies, current medications, past family history, past medical history, past social history, past surgical history and problem list      Review of Systems:     Review of Systems   Constitutional: Negative for chills, diaphoresis, fatigue, fever and unexpected weight change  HENT: Negative for hearing loss  Eyes: Negative for visual disturbance  Respiratory: Negative for cough, chest tightness, shortness of breath and wheezing  Cardiovascular: Negative for chest pain, palpitations and leg swelling  Gastrointestinal: Negative for abdominal pain, diarrhea, nausea and vomiting  Genitourinary: Positive for dysuria, frequency and urgency  Musculoskeletal: Positive for myalgias (chronic fibromyalgia pains)  Negative for back pain  Skin: Negative for pallor  Neurological: Negative for syncope, light-headedness and headaches  Psychiatric/Behavioral: Negative for confusion          Problem List:     Patient Active Problem List   Diagnosis   • Stage 4 chronic kidney disease ([facility] [address] )   • Hypertensive chronic kidney disease with stage 1 through stage 4 chronic kidney disease, or unspecified chronic kidney "disease   • BARBARA (obstructive sleep apnea)   • RLS (restless legs syndrome)   • Persistent proteinuria   • Insulin dependent type 2 diabetes mellitus ([facility])   • Pulmonary hypertension (HCC)   • Dyspnea   • History of repair of hiatal hernia   • ANDRA (acute kidney injury) ([facility] [address] )   • Hyperlipidemia   • Vitamin D deficiency   • Fibromyalgia   • Type 2 diabetes mellitus with diabetic chronic kidney disease ([facility])   • Leukocytosis   • History of kidney stones   • Essential hypertension   • Ambulatory dysfunction   • Memory changes   • Gastroparesis diabeticorum (HCC)   • B12 neuropathy (HCC)   • Depression, recurrent ([facility])   • Epigastric abdominal pain with severe diabetic gastroparesis, status post EGD/Botox injection, 5/14   • COPD (chronic obstructive pulmonary disease) ([facility])   • Chronic constipation   • History of colon polyps   • COVID-19 virus infection   • Other chest pain   • Urinary retention   • UTI (urinary tract infection)   • S/P foot surgery   • Urinary incontinence   • Hypothyroid   • 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   • Fall   • Fracture of orbital floor, blow-out, right, closed, with routine healing, subsequent encounter   • Nasal bones, closed fracture   • Facial abrasion, initial encounter   • Multiple closed fractures of ribs of right side   • Seasonal allergies   • Elevated liver enzymes   • Hyponatremia   • Acute-on-chronic kidney injury ([facility] [address] )   • Acute cystitis without hematuria   • Osteoporosis        Objective:     /80 (BP Location: Right arm, Patient Position: Sitting, Cuff Size: Large)   Pulse 68   Temp 97 8 °F (36 6 °C) (Tympanic)   Ht 5' 4\" (1 626 m)   Wt 79 1 kg (174 lb 6 4 oz)   SpO2 93%   BMI 29 94 kg/m²     Physical Exam  Constitutional:       General: She is not in acute distress  Appearance: Normal appearance  She is not ill-appearing  HENT:      Head: Normocephalic and atraumatic        Mouth/Throat:      Mouth: Mucous membranes are moist    Eyes:      " General: No scleral icterus  Extraocular Movements: Extraocular movements intact  Pupils: Pupils are equal, round, and reactive to light  Cardiovascular:      Rate and Rhythm: Normal rate and regular rhythm  Pulses: Normal pulses  Heart sounds: Normal heart sounds  No murmur heard  No friction rub  No gallop  Pulmonary:      Effort: Pulmonary effort is normal  No respiratory distress  Breath sounds: Normal breath sounds  No wheezing or rales  Abdominal:      General: Bowel sounds are normal  There is no distension  Palpations: Abdomen is soft  Tenderness: There is no abdominal tenderness  There is no right CVA tenderness, left CVA tenderness or guarding  Musculoskeletal:      Right lower leg: No edema  Left lower leg: No edema  Skin:     General: Skin is warm  Capillary Refill: Capillary refill takes less than 2 seconds  Neurological:      Mental Status: She is alert and oriented to person, place, and time  Mental status is at baseline     Psychiatric:         Mood and Affect: Mood normal          Behavior: Behavior normal          Pertinent Laboratory/Diagnostic Studies:    Laboratory Results: I have personally reviewed the pertinent laboratory results/reports     CBC:   Results from Last 12 Months   Lab Units 05/23/23  1026 04/27/23  0436   BASOS PCT %  --  0   EOS ABS Thousand/µL  --  0 38   EOS PCT %  --  6   HEMATOCRIT % 37 9 32 6*   HEMOGLOBIN g/dL 11 8 10 6*   LYMPHS PCT %  --  34   LYMPHS ABS Thousands/µL  --  2 33   MCH pg 28 4 29 8   MCHC g/dL 31 1* 32 5   MCV fL 91 92   MONOS PCT %  --  9   MONOS ABS Thousand/µL  --  0 58   MPV fL 11 3 11 9   NEUTROS PCT %  --  50   NEUTROS ABS Thousands/µL  --  3 41   NRBC AUTO /100 WBCs  --  0   PLATELETS Thousands/uL 301 193   RBC Million/uL 4 15 3 56*   RDW % 14 5 13 8   WBC Thousand/uL 12 21* 6 81     Chemistry Profile:   Results from Last 12 Months   Lab Units 05/23/23  1026 05/16/23  0757 04/28/23  0601 ALK PHOS U/L 65  --   --    ALT U/L 54  --   --    AST U/L 19  --   --    BUN mg/dL 41*   < > 52*   CALCIUM mg/dL 9 1   < > 8 4   CHLORIDE mmol/L 102   < > 108   CO2 mmol/L 31   < > 23   CORRECTED CALCIUM mg/dL 9 7  --   --    CREATININE mg/dL 1 94*   < > 1 90*   EGFR ml/min/1 73sq m 24   < > 25   GLUCOSE RANDOM mg/dL  --   --  110   GLUCOSE FASTING mg/dL 200*   < >  --    POTASSIUM mmol/L 4 4   < > 4 6   MAGNESIUM mg/dL 2 4  --   --    PHOSPHORUS mg/dL 3 2  --   --     < > = values in this interval not displayed  Radiology/Other Diagnostic Testing Results: I have personally reviewed pertinent reports        Lima Skelton MD  [facility] INTERNAL MEDICINE [address] [facility]

## 2023-05-24 NOTE — ASSESSMENT & PLAN NOTE
· Lipids panel shows TAGs in the 600s, total cholesterol 280, normal LDL  · Patient took 3 days prednisone for gout last week  · States she did fasting for lipid panel    Plan:  · Will recheck lipid panel and reassess  · Continue omega 3s  · Dr Rodolfo Bass called patient today and started Crestor 5mg daily

## 2023-05-24 NOTE — PROGRESS NOTES
Assessment and Plan:     Problem List Items Addressed This Visit        Endocrine    Type 2 diabetes mellitus with diabetic chronic kidney disease ([facility] [address] )       Lab Results   Component Value Date    HGBA1C 6 8 (H) 03/07/2023   Blood sugars appropriate, will continue to monitor  Encouraged lifestyle modifications  Home regimen: Levemir 50units HS, Aspart 16units breakfas/lunch, 25units with dinner    Plan:  Continue outpatient regimen           Relevant Medications    torsemide (DEMADEX) 10 mg tablet       Cardiovascular and Mediastinum    Essential hypertension - Primary     /80 in the office  Reviewed BP log, seems to be in 140s-160s at home    Plan:  · Will increase Olmesartan to 40mg daily  · Decreased dose of Torsemide to 5mg daily  · BMP in 1 month  · F/u in 1 month         Relevant Medications    olmesartan (BENICAR) 40 mg tablet    torsemide (DEMADEX) 10 mg tablet    amLODIPine (NORVASC) 5 mg tablet    Other Relevant Orders    Basic metabolic panel    Lipid Panel with Direct LDL reflex       Musculoskeletal and Integument    Osteoporosis     · Follows with Rheumatology  · Gets Prolia injections every 6 months  · Last injection in April 2023    Plan:  Continue seeing Rheumatology for Prolia injections  Continue Vitamin D supplementation and Calcium            Genitourinary    Acute cystitis without hematuria     · Hx recurrent UTIs  · Last culture 5/4 grew Enterobacter Cloace and patient completed course of doxycycline  · Currently in clinic endorsing dysuria, urgency, frequency  · No fevers or chills  · Urine culture from yesterday came back showing E coli >100k CFUs    Plan:  · Will give Keflex 250mg q6hrs for 5 days         Relevant Medications    cephalexin (KEFLEX) 250 mg capsule       Other    Dyslipidemia    Relevant Orders    Lipid Panel with Direct LDL reflex   Other Visit Diagnoses     Encounter for screening mammogram for malignant neoplasm of breast        Relevant Orders    Mammo screening bilateral w 3d & cad        BMI Counseling: Body mass index is 29 94 kg/m²  The BMI is above normal  Nutrition recommendations include decreasing portion sizes, encouraging healthy choices of fruits and vegetables, decreasing fast food intake, consuming healthier snacks and increasing intake of lean protein  Exercise recommendations include moderate physical activity 150 minutes/week  No pharmacotherapy was ordered  Rationale for BMI follow-up plan is due to patient being overweight or obese  Falls Plan of Care: balance, strength, and gait training instructions were provided  Recommended assistive device to help with gait and balance  Preventive health issues were discussed with patient, and age appropriate screening tests were ordered as noted in patient's After Visit Summary  Personalized health advice and appropriate referrals for health education or preventive services given if needed, as noted in patient's After Visit Summary  History of Present Illness:     Patient presents for a Medicare Wellness Visit    Ena Stephens is a 75year old female with past medical hx of HTN, HLD, DM2 with neuropathy and gastroparesis, CKD4, fibromyalgia, hypothyroidism, COPD, pulmonary HTN presenting to the clinic for annual wellness visit  She has had mammogram last year that showed right breast abnormality, biopsy was done and turned up benign  She is due for mammogram this year  Patient follows up with ophthalmology and has had DM eye exam this year  Patient Care Team:  Sofía Martin MD as PCP - General (Internal Medicine)  Lenka Crook MD (Urology)  Desmond Lyn DO as Consulting Physician (Endocrinology)  Alen Chang MD (Nephrology)     Review of Systems:     Review of Systems   Constitutional: Negative for chills, diaphoresis, fatigue and fever  HENT: Negative for hearing loss  Eyes: Negative for visual disturbance     Respiratory: Negative for cough, chest tightness, shortness of breath and wheezing  Cardiovascular: Negative for chest pain, palpitations and leg swelling  Gastrointestinal: Negative for abdominal pain, constipation, diarrhea, nausea and vomiting  Genitourinary: Positive for dysuria, frequency and urgency  Musculoskeletal: Positive for myalgias (chronic fibromyalgia pains)  Negative for back pain  Skin: Negative for rash  Neurological: Negative for dizziness, light-headedness and headaches  Psychiatric/Behavioral: Negative for confusion          Problem List:     Patient Active Problem List   Diagnosis   • Stage 4 chronic kidney disease ([place] [address] )   • Hypertensive chronic kidney disease with stage 1 through stage 4 chronic kidney disease, or unspecified chronic kidney disease   • BARBARA (obstructive sleep apnea)   • RLS (restless legs syndrome)   • Persistent proteinuria   • Insulin dependent type 2 diabetes mellitus ([place] [address] )   • Pulmonary hypertension ([facility])   • Dyspnea   • History of repair of hiatal hernia   • ANDRA (acute kidney injury) ([place] [address] )   • Dyslipidemia   • Vitamin D deficiency   • Fibromyalgia   • Type 2 diabetes mellitus with diabetic chronic kidney disease ([place] [address] )   • Leukocytosis   • History of kidney stones   • Essential hypertension   • Ambulatory dysfunction   • Memory changes   • Gastroparesis diabeticorum ([facility])   • B12 neuropathy ([facility])   • Depression, recurrent ([facility])   • Epigastric abdominal pain with severe diabetic gastroparesis, status post EGD/Botox injection, 5/14   • COPD (chronic obstructive pulmonary disease) ([facility])   • Chronic constipation   • History of colon polyps   • COVID-19 virus infection   • Other chest pain   • Urinary retention   • UTI (urinary tract infection)   • S/P foot surgery   • Urinary incontinence   • Hypothyroid   • 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)   • Acute cystitis without hematuria   • Osteoporosis      Past Medical and Surgical History:     Past Medical History:   Diagnosis Date   • Allergic    • Allergic rhinitis    • Anesthesia     pt reports \"had to use double lumen endo tube for hiatal hernia repair/so surgeon could get to where he needed to work\"   • Arthritis    • Aspiration into airway    • [place]umada esophagus 1993    Lot of stress with children   • Basal cell carcinoma 2007    left cheek    • BCC (basal cell carcinoma) 05/27/2021    Left Nasal tip   • Cancer (HCC)     squamous cell cancer on forhead   • Cancer ([facility] [address] )     basal cell on nose    • Cholelithiasis    • Chronic kidney disease 2000, 2018    Stones, kidney disease stage 4   • Chronic pain disorder     bilat feet and joint pain on occas   • Colon polyp    • Constipation    • COPD (chronic obstructive pulmonary disease) ([facility] [address] )    • COVID-19 08/2021    recovered at home/did receive monoclonal infusion   • Dental bridge present    • Depression    • Diabetes mellitus ([facility] [address] )     Type 2   • Diabetic neuropathy ([facility] [address] )    • Disease of thyroid gland    • Family history of thyroid problem    • Fatty liver    • GERD (gastroesophageal reflux disease)    • Heart burn    • Hiatal hernia    • History of pneumonia    • Hyperlipidemia    • Hyperplasia, parathyroid ([facility] [address] )    • Hypertension    • Hypotension 4/13/2022   • Kidney problem    • Kidney stone    • Memory loss Julu2 2020   • Motion sickness    • Motion sickness    • Neck pain    • Obesity 1978   • Obesity (BMI 30 0-34  9)    • Pollen allergies    • RLS (restless legs syndrome)    • SCC (squamous cell carcinoma) 05/04/2021    left mid forehead   • Seasonal allergies    • Sleep apnea     has inspire   • Squamous cell skin cancer 2007    left cheek    • Swollen ankles    • 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 " fusion, left     great toe fusion   • Urinary incontinence    • Urinary tract infection 03/28/2022   • Wears glasses      Past Surgical History:   Procedure Laterality Date   • ABDOMINAL SURGERY  [id number],[id number]    Nissen x2 1972 tubal ligarion   • ARTHROSCOPY KNEE     • BREAST BIOPSY      stereotactic-benign   • BREAST BIOPSY      stereo-benign   • BREAST EXCISIONAL BIOPSY      unknown date-benign   • BREAST EXCISIONAL BIOPSY      unknown date-benign   • BREAST EXCISIONAL BIOPSY      unknown date-benign   • BREAST EXCISIONAL BIOPSY      unknown age-benign   • CARDIAC CATHETERIZATION     • CHOLECYSTECTOMY     • COLONOSCOPY     • EXAMINATION UNDER ANESTHESIA N/A 06/24/2021    Procedure: EXAM UNDER ANESTHESIA (EUA), DISE;  Surgeon: Enma Pierre MD;  [facility];  Service: ENT   • HERNIA REPAIR     • HIATAL HERNIA REPAIR     • KNEE SURGERY      Torn maniscus lap surg   • LIPOSUCTION     • LYMPH NODE BIOPSY     • MOHS SURGERY  05/20/2021    left mid forehead-Gautam   • MOHS SURGERY Left 05/27/2021    Left nasal tip- gautam    • KS LIGATION/BIOPSY TEMPORAL ARTERY Left 01/05/2023    Procedure: BIOPSY ARTERY TEMPORAL;  Surgeon: Agusto Orona DO;  [facility];  Service: Vascular   • KS OPEN IMPLANTATION CRANIAL NERVE VASQUEZ & PULSE GEN N/A 11/10/2021    Procedure: INSERTION UPPER AIRWAY STIMULATOR, INSPIRE IMPLANT;  Surgeon: Enma Pierre MD;  [facility];  Service: ENT   • KS UNLISTED PROCEDURE FOOT/TOES Right 07/19/2022    Procedure: 1st metatarsal phalangeal joint fusion;  Surgeon: Britany Mehta DPM;  [facility];  Service: Podiatry   • REDUCTION MAMMAPLASTY Bilateral 2000   • REDUCTION MAMMAPLASTY     • SKIN BIOPSY     • SKIN CANCER EXCISION  2007    squamous cell carcinoma    • SKIN CANCER EXCISION  2007    basal cell carcinoma   • SQUAMOUS CELL CARCINOMA EXCISION     • TOE SURGERY Right 08/04/2022    Right Great Toe Fusion   • TONSILLECTOMY     • TUBAL LIGATION     • UPPER GASTROINTESTINAL ENDOSCOPY     • US GUIDED BREAST BIOPSY RIGHT COMPLETE Right 2022      Family History:     Family History   Problem Relation Age of Onset   • Heart disease Mother    • Depression Mother    • Hypertension Mother    • COPD Mother    • Hearing loss Mother    • Anxiety disorder Mother    • Heart disease Father    • Lung cancer Father 79        Smoker    • Cancer Father         brain   • Alcohol abuse Father    • Dementia Father    • Hypertension Father    • Thyroid disease Father    • COPD Father    • Arthritis Father    • Brain cancer Father 76   • Hypertension Sister    • Diabetes Sister    • Heart disease Sister    • Thyroid disease Sister    • Cancer Sister         Lympoma, lung   • Lung cancer Sister 78   • Anxiety disorder Sister    • Hypertension Brother    • Diabetes Brother    • Cancer Brother         Throat   • Dementia Brother    • Stroke Brother    • Hypertension Brother    • Heart disease Brother    • Diabetes Brother    • Hypertension Brother    • No Known Problems Son    • No Known Problems Son    • Brain cancer Paternal [de-identified]         unknown age   • Diabetes Sister    • Hypertension Sister    • Diabetes Brother    • Breast cancer Neg Hx       Social History:     Social History     Socioeconomic History   • Marital status: /Civil Union     Spouse name: None   • Number of children: None   • Years of education: None   • Highest education level: None   Occupational History   • Occupation: RETIRED   Tobacco Use   • Smoking status: Former     Packs/day: 2 00     Years: 10 00     Total pack years: 20 00     Types: Cigarettes     Start date: 1968     Quit date: 1976     Years since quittin 4     Passive exposure: Past   • Smokeless tobacco: Never   • Tobacco comments:     Smoked 2 pack a day   Vaping Use   • Vaping Use: Never used   Substance and Sexual Activity   • Alcohol use: Yes     Comment: 1 or 2 a year   • Drug use: No   • Sexual activity: Not Currently Partners: Male     Birth control/protection: Female Sterilization     Comment: defer   Other Topics Concern   • None   Social History Narrative   • None     Social Determinants of Health     Financial Resource Strain: Low Risk  (5/24/2023)    Overall Financial Resource Strain (CARDIA)    • Difficulty of Paying Living Expenses: Not hard at all   Food Insecurity: No Food Insecurity (3/27/2023)    Hunger Vital Sign    • Worried About Running Out of Food in the Last Year: Never true    • Ran Out of Food in the Last Year: Never true   Transportation Needs: No Transportation Needs (5/24/2023)    PRAPARE - Transportation    • Lack of Transportation (Medical): No    • Lack of Transportation (Non-Medical):  No   Physical Activity: Not on file   Stress: Not on file   Social Connections: Not on file   Intimate Partner Violence: Not on file   Housing Stability: Unknown (3/27/2023)    Housing Stability Vital Sign    • Unable to Pay for Housing in the Last Year: No    • Number of Places Lived in the Last Year: Not on file    • Unstable Housing in the Last Year: No      Medications and Allergies:     Current Outpatient Medications   Medication Sig Dispense Refill   • acetaminophen (TYLENOL) 325 mg tablet Take 3 tablets (975 mg total) by mouth every 8 (eight) hours  0   • albuterol (Ventolin HFA) 90 mcg/act inhaler Inhale 2 puffs every 6 (six) hours as needed for wheezing 54 g 0   • amLODIPine (NORVASC) 5 mg tablet Take 1 tablet (5 mg total) by mouth daily 90 tablet 1   • B-D ULTRAFINE III SHORT PEN 31G X 8 MM MISC   3   • Calcium Citrate 1040 MG TABS Take 500 mg by mouth Three times a day     • cephalexin (KEFLEX) 250 mg capsule Take 1 capsule (250 mg total) by mouth every 6 (six) hours for 5 days 20 capsule 0   • Cranberry 200 MG CAPS Take 200 capsules by mouth 3 (three) times a day     • DULoxetine (CYMBALTA) 30 mg delayed release capsule Take 60 mg by mouth daily     • estradiol (ESTRACE VAGINAL) 0 1 mg/g vaginal cream Apply pea sized amount around urethra and inside vaginal opening 3 times weekly 42 5 g 2   • gabapentin (Neurontin) 300 mg capsule Take 1 capsule (300 mg total) by mouth daily at bedtime 90 capsule 0   • Icosapent Ethyl 1 g CAPS Take 1 capsule by mouth 2 (two) times a day     • insulin aspart (NovoLOG) 100 units/mL injection Inject under the skin 3 (three) times a day before meals 16 units, 16 units, 25 units  • insulin detemir (Levemir FlexTouch) 100 Units/mL injection pen Inject 50 Units under the skin every evening      • levothyroxine 112 mcg tablet Take 112 mcg by mouth every morning     • metoprolol tartrate (LOPRESSOR) 50 mg tablet Take 0 5 tablets (25 mg total) by mouth every 12 (twelve) hours 180 tablet 3   • olmesartan (BENICAR) 40 mg tablet Take 1 tablet (40 mg total) by mouth daily 30 tablet 1   • pantoprazole (PROTONIX) 40 mg tablet TAKE 1 TABLET BY MOUTH TWICE A  tablet 2   • pramipexole (MIRAPEX) 0 25 mg tablet Take 1 tablet (0 25 mg total) by mouth daily at bedtime 90 tablet 2   • Probiotic Product (PROBIOTIC PO) Take 1 capsule by mouth 2 (two) times a day     • torsemide (DEMADEX) 10 mg tablet Take 0 5 tablets (5 mg total) by mouth daily 30 tablet 1   • Vitamin D, Ergocalciferol, 2000 units CAPS Take 2,000 Units by mouth daily      • b complex vitamins capsule Take 1 capsule by mouth daily (Patient not taking: Reported on 5/24/2023)       No current facility-administered medications for this visit       Allergies   Allergen Reactions   • Sulfamethoxazole-Trimethoprim Other (See Comments)     Tongue swelling   • Ciprofloxacin Hives and Itching      Immunizations:     Immunization History   Administered Date(s) Administered   • COVID-19 MODERNA VACC 0 5 ML IM 01/29/2021, 02/26/2021, 02/24/2022   • INFLUENZA 10/17/2018, 10/01/2019   • Influenza Split High Dose Preservative Free IM 10/17/2018, 10/01/2019   • Influenza, high dose seasonal 0 7 mL 09/29/2020, 10/12/2021   • Pneumococcal Conjugate Vaccine 20-valent (Pcv20), Polysace 07/12/2022, 07/12/2022   • Pneumococcal Polysaccharide PPV23 04/01/2021   • Tdap 12/26/2018   • influenza, trivalent, adjuvanted 09/29/2020, 10/12/2021      Health Maintenance:         Topic Date Due   • Breast Cancer Screening: Mammogram  07/13/2023   • Colorectal Cancer Screening  10/10/2024   • Hepatitis C Screening  Completed         Topic Date Due   • COVID-19 Vaccine (4 - [place] series) 04/21/2022      Medicare Screening Tests and Risk Assessments:     Michael Max is here for her Initial Wellness visit  Last Medicare Wellness visit information reviewed, patient interviewed and updates made to the record today  Health Risk Assessment:   Patient rates overall health as fair  Patient feels that their physical health rating is slightly worse  Patient is satisfied with their life  Eyesight was rated as slightly worse  Hearing was rated as slightly worse  Patient feels that their emotional and mental health rating is same  Patients states they are sometimes angry  Patient states they are always unusually tired/fatigued  Pain experienced in the last 7 days has been a lot  Patient's pain rating has been 6/10  Patient states that she has experienced no weight loss or gain in last 6 months  Fall Risk Screening: In the past year, patient has experienced: history of falling in past year    Number of falls: 2 or more  Injured during fall?: Yes    Feels unsteady when standing or walking?: Yes    Worried about falling?: No      Urinary Incontinence Screening:   Patient has leaked urine accidently in the last six months  Home Safety:  Patient does not have trouble with stairs inside or outside of their home  Patient has working smoke alarms and has working carbon monoxide detector  Home safety hazards include: none  Nutrition:   Current diet is Frequent junk food  Medications:   Patient is currently taking over-the-counter supplements   OTC medications include: see medication list  Patient is able to manage medications   helps with medications    Activities of Daily Living (ADLs)/Instrumental Activities of Daily Living (IADLs):   Walk and transfer into and out of bed and chair?: Yes  Dress and groom yourself?: Yes    Bathe or shower yourself?: Yes    Feed yourself? Yes  Do your laundry/housekeeping?: No  Manage your money, pay your bills and track your expenses?: No  Make your own meals?: Yes    Do your own shopping?: No    Previous Hospitalizations:   Any hospitalizations or ED visits within the last 12 months?: Yes    How many hospitalizations have you had in the last year?: 3-4    Advance Care Planning:   Living will: Yes    Durable POA for healthcare: No    Advanced directive: Yes    Advanced directive counseling given: Yes    Five wishes given: No    End of Life Decisions reviewed with patient: Yes      Cognitive Screening:   Provider or family/friend/caregiver concerned regarding cognition?: No    PREVENTIVE SCREENINGS      Cardiovascular Screening:    General: Screening Current      Diabetes Screening:     General: Screening Not Indicated and History Diabetes      Colorectal Cancer Screening:     General: Screening Current      Breast Cancer Screening:     General: Screening Current      Cervical Cancer Screening:    General: Screening Not Indicated      Osteoporosis Screening:    General: Screening Not Indicated and History Osteoporosis      Abdominal Aortic Aneurysm (AAA) Screening:        General: Screening Not Indicated      Lung Cancer Screening:     General: Screening Not Indicated      Hepatitis C Screening:    General: Screening Current    Screening, Brief Intervention, and Referral to Treatment (SBIRT)    Screening  Typical number of drinks in a day: 0  Typical number of drinks in a week: 0  Interpretation: Low risk drinking behavior      Single Item Drug Screening:  How often have you used an illegal drug (including marijuana) or a prescription medication for non-medical "reasons in the past year? never    Single Item Drug Screen Score: 0  Interpretation: Negative screen for possible drug use disorder    No results found  Physical Exam:     /80 (BP Location: Right arm, Patient Position: Sitting, Cuff Size: Large)   Pulse 68   Temp 97 8 °F (36 6 °C) (Tympanic)   Ht 5' 4\" (1 626 m)   Wt 79 1 kg (174 lb 6 4 oz)   SpO2 93%   BMI 29 94 kg/m²     Physical Exam  Constitutional:       General: She is not in acute distress  Appearance: Normal appearance  She is not ill-appearing  HENT:      Head: Normocephalic and atraumatic  Mouth/Throat:      Mouth: Mucous membranes are moist    Eyes:      General: No scleral icterus  Extraocular Movements: Extraocular movements intact  Pupils: Pupils are equal, round, and reactive to light  Cardiovascular:      Rate and Rhythm: Normal rate and regular rhythm  Pulses: Normal pulses  Heart sounds: Normal heart sounds  No murmur heard  No friction rub  No gallop  Pulmonary:      Effort: Pulmonary effort is normal  No respiratory distress  Breath sounds: Normal breath sounds  No wheezing or rales  Abdominal:      General: Bowel sounds are normal  There is no distension  Palpations: Abdomen is soft  Tenderness: There is no abdominal tenderness  There is no right CVA tenderness, left CVA tenderness or guarding  Musculoskeletal:      Right lower leg: No edema  Left lower leg: No edema  Skin:     General: Skin is warm  Capillary Refill: Capillary refill takes less than 2 seconds  Neurological:      Mental Status: She is alert and oriented to person, place, and time  Mental status is at baseline     Psychiatric:         Mood and Affect: Mood normal          Behavior: Behavior normal         Nutrition Assessment and Intervention:     Reviewed food recall journal    Recommended completion of food recall journal    Ordered nutritional assessment labs      Physical Activity Assessment " and Intervention:    Activity journal reviewed    Activity journal completion recommended      Emotional and Mental Well-being, Sleep, Connectedness Assessment and Intervention:    Sleep/stress assessment performed      Tobacco and Toxic Substance Assessment and Intervention:     Tobacco use screening performed    Alcohol and drug use screening performed          Logan Cabrera MD

## 2023-05-24 NOTE — ASSESSMENT & PLAN NOTE
/80 in the office  Reviewed BP log, seems to be in 140s-160s at home    Plan:  · Will increase Olmesartan to 40mg daily  · Decreased dose of Torsemide to 5mg daily  · BMP in 1 month  · F/u in 1 month

## 2023-05-24 NOTE — ASSESSMENT & PLAN NOTE
· Follows with Rheumatology  · Gets Prolia injections every 6 months  · Last injection in April 2023    Plan:  Continue seeing Rheumatology for Prolia injections  Continue Vitamin D supplementation and Calcium

## 2023-05-24 NOTE — TELEPHONE ENCOUNTER
----- Message from Bia Santiago MD sent at 5/23/2023  4:04 PM EDT -----  1   Cholesterol is quite high   2   Sodium slightly low     Recommend   1   Please have her start rosuvastatin 5 mg daily watch for body aches joint pains   2   Modest fluid restriction 7705-1583 mL/day 48-68 ounces at most   3  please have her send in a week of blood pressure readings at this time   4   Please repeat a CMP CK in about 4 weeks before she comes to see me these are nonfasting with a modest fluid restriction   5   Please review all the medications with her      Also, vitamin D is low  1  I recommend vitamin D 50,000 units/ergocalciferol once a week for 8 weeks then vitamin D 2000 units daily over-the-counter thereafter  2    Her triglycerides are also up I recommend fish oil 1 g twice daily over-the-counter along with a low-fat low-cholesterol diet    Thank you  ----- Message -----  From: Lab, Background User  Sent: 5/23/2023   2:17 PM EDT  To: Bia Santiago MD

## 2023-05-25 PROBLEM — N39.0 UTI (URINARY TRACT INFECTION): Status: RESOLVED | Noted: 2022-04-14 | Resolved: 2023-05-25

## 2023-05-26 ENCOUNTER — TELEPHONE (OUTPATIENT)
Dept: OTHER | Facility: OTHER | Age: 73
End: 2023-05-26

## 2023-05-26 ENCOUNTER — TELEPHONE (OUTPATIENT)
Dept: UROLOGY | Facility: CLINIC | Age: 73
End: 2023-05-26

## 2023-05-26 DIAGNOSIS — N39.0 RECURRENT UTI: Primary | ICD-10-CM

## 2023-05-26 LAB
BACTERIA UR CULT: ABNORMAL
BLAIMP ISLT/SPM QL: NOT DETECTED
BLAKPC ISLT/SPM QL: NOT DETECTED
BLAOXA-48 ISLT/SPM QL: NOT DETECTED
BLAVIM ISLT/SPM QL: NOT DETECTED
NDM CEPHEID: NOT DETECTED

## 2023-05-26 RX ORDER — LEVOFLOXACIN 500 MG/1
500 TABLET, FILM COATED ORAL EVERY 24 HOURS
Qty: 7 TABLET | Refills: 0 | Status: SHIPPED | OUTPATIENT
Start: 2023-05-26 | End: 2023-06-03

## 2023-05-26 NOTE — TELEPHONE ENCOUNTER
Patient is calling office about levofloxacin (LEVAQUIN) 500 mg tablet she is not sure way medication was changed

## 2023-05-26 NOTE — TELEPHONE ENCOUNTER
Called patient l/m to call back regarding message below       ----- Message from Cleopatra Madrid PA-C sent at 5/26/2023  1:04 PM EDT -----  Please let the patient know that her updated urine culture has resulted with evidence of resistance to her Keflex  If she remains symptomatic, prescription for Levaquin sent to pharmacy

## 2023-05-26 NOTE — TELEPHONE ENCOUNTER
Left message with spouse informing him patient was prescribed Levaquin due to the urine culture results showing resistance to Keflex     verbalized understanding and was thankful for phone call

## 2023-05-27 LAB

## 2023-05-29 ENCOUNTER — APPOINTMENT (EMERGENCY)
Dept: RADIOLOGY | Facility: HOSPITAL | Age: 73
End: 2023-05-29

## 2023-05-29 ENCOUNTER — HOSPITAL ENCOUNTER (EMERGENCY)
Facility: HOSPITAL | Age: 73
Discharge: HOME | End: 2023-05-29
Attending: EMERGENCY MEDICINE | Admitting: EMERGENCY MEDICINE

## 2023-05-29 VITALS
RESPIRATION RATE: 18 BRPM | BODY MASS INDEX: 30.22 KG/M2 | SYSTOLIC BLOOD PRESSURE: 139 MMHG | HEIGHT: 64 IN | WEIGHT: 177.03 LBS | HEART RATE: 74 BPM | OXYGEN SATURATION: 92 % | DIASTOLIC BLOOD PRESSURE: 74 MMHG | TEMPERATURE: 98.6 F

## 2023-05-29 DIAGNOSIS — R07.9 CHEST PAIN, UNSPECIFIED: Primary | ICD-10-CM

## 2023-05-29 LAB
2HR DELTA HS TROPONIN: 0 NG/L
ALBUMIN SERPL BCP-MCNC: 3.8 G/DL (ref 3.5–5)
ALP SERPL-CCNC: 53 U/L (ref 34–104)
ALT SERPL W P-5'-P-CCNC: 18 U/L (ref 7–52)
ANION GAP SERPL CALCULATED.3IONS-SCNC: 9 MMOL/L (ref 4–13)
AST SERPL W P-5'-P-CCNC: 15 U/L (ref 13–39)
ATRIAL RATE: 87 BPM
BASOPHILS # BLD AUTO: 0.05 THOUSANDS/ÂΜL (ref 0–0.1)
BASOPHILS NFR BLD AUTO: 0 % (ref 0–1)
BILIRUB SERPL-MCNC: 0.38 MG/DL (ref 0.2–1)
BUN SERPL-MCNC: 27 MG/DL (ref 5–25)
CALCIUM SERPL-MCNC: 9.3 MG/DL (ref 8.4–10.2)
CARDIAC TROPONIN I PNL SERPL HS: 7 NG/L
CARDIAC TROPONIN I PNL SERPL HS: 7 NG/L
CHLORIDE SERPL-SCNC: 102 MMOL/L (ref 96–108)
CO2 SERPL-SCNC: 30 MMOL/L (ref 21–32)
CREAT SERPL-MCNC: 2 MG/DL (ref 0.6–1.3)
EOSINOPHIL # BLD AUTO: 0.23 THOUSAND/ÂΜL (ref 0–0.61)
EOSINOPHIL NFR BLD AUTO: 2 % (ref 0–6)
ERYTHROCYTE [DISTWIDTH] IN BLOOD BY AUTOMATED COUNT: 14.6 % (ref 11.6–15.1)
GFR SERPL CREATININE-BSD FRML MDRD: 24 ML/MIN/1.73SQ M
GLUCOSE SERPL-MCNC: 177 MG/DL (ref 65–140)
HCT VFR BLD AUTO: 37.4 % (ref 34.8–46.1)
HGB BLD-MCNC: 11.7 G/DL (ref 11.5–15.4)
IMM GRANULOCYTES # BLD AUTO: 0.17 THOUSAND/UL (ref 0–0.2)
IMM GRANULOCYTES NFR BLD AUTO: 1 % (ref 0–2)
LYMPHOCYTES # BLD AUTO: 2.4 THOUSANDS/ÂΜL (ref 0.6–4.47)
LYMPHOCYTES NFR BLD AUTO: 19 % (ref 14–44)
MCH RBC QN AUTO: 28.5 PG (ref 26.8–34.3)
MCHC RBC AUTO-ENTMCNC: 31.3 G/DL (ref 31.4–37.4)
MCV RBC AUTO: 91 FL (ref 82–98)
MONOCYTES # BLD AUTO: 0.77 THOUSAND/ÂΜL (ref 0.17–1.22)
MONOCYTES NFR BLD AUTO: 6 % (ref 4–12)
NEUTROPHILS # BLD AUTO: 9.25 THOUSANDS/ÂΜL (ref 1.85–7.62)
NEUTS SEG NFR BLD AUTO: 72 % (ref 43–75)
NRBC BLD AUTO-RTO: 0 /100 WBCS
P AXIS: 72 DEGREES
PLATELET # BLD AUTO: 274 THOUSANDS/UL (ref 149–390)
PMV BLD AUTO: 10.9 FL (ref 8.9–12.7)
POTASSIUM SERPL-SCNC: 3.8 MMOL/L (ref 3.5–5.3)
PR INTERVAL: 210 MS
PROT SERPL-MCNC: 6.4 G/DL (ref 6.4–8.4)
QRS AXIS: 16 DEGREES
QRSD INTERVAL: 82 MS
QT INTERVAL: 370 MS
QTC INTERVAL: 445 MS
RBC # BLD AUTO: 4.1 MILLION/UL (ref 3.81–5.12)
SODIUM SERPL-SCNC: 141 MMOL/L (ref 135–147)
T WAVE AXIS: 35 DEGREES
VENTRICULAR RATE: 87 BPM
WBC # BLD AUTO: 12.87 THOUSAND/UL (ref 4.31–10.16)

## 2023-05-29 RX ORDER — ACETAMINOPHEN 325 MG/1
975 TABLET ORAL ONCE
Status: COMPLETED | OUTPATIENT
Start: 2023-05-29 | End: 2023-05-29

## 2023-05-29 RX ADMIN — ACETAMINOPHEN 975 MG: 325 TABLET ORAL at 07:21

## 2023-05-29 NOTE — ED ATTENDING ATTESTATION
5/29/2023  INayla MD, saw and evaluated the patient  I have discussed the patient with the resident/non-physician practitioner and agree with the resident's/non-physician practitioner's findings, Plan of Care, and MDM as documented in the resident's/non-physician practitioner's note, except where noted  All available labs and Radiology studies were reviewed  I was present for key portions of any procedure(s) performed by the resident/non-physician practitioner and I was immediately available to provide assistance  At this point I agree with the current assessment done in the Emergency Department  I have conducted an independent evaluation of this patient a history and physical is as follows:  Patient is a 65-year-old female with a history of diabetes, hypertension, and hyperlipidemia  No history of coronary artery disease  No history of thromboembolic disease  Around 3 AM she developed a left-sided nonradiating chest tightness  No back pain  No radiation  No migration  No hemoptysis  No calf pain unilateral leg swelling  Physical examination: Lungs are clear  Regular rate and rhythm  Benign abdominal exam   No calf pain unilateral leg swelling  Assessment/plan: EKG is nonspecific ST wave changes  No STEMI  Heart score is at least 6  Troponin will determine whether patient will be appropriate for discharge and outpatient management  As per Wells score, pulmonary embolism is unlikely  No back pain to suggest dissection  Chest x-ray will be done to rule out pneumothorax or pneumonia  No EKG changes consistent with pericarditis  Myocarditis unlikely, but will check troponins    ED Course         Critical Care Time  Procedures

## 2023-05-29 NOTE — ED PROVIDER NOTES
History  Chief Complaint   Patient presents with   • Chest Pain     Patient reports waking up at 3am with palpitations and chest discomfort  Went back to sleep and awoke again at 6am with the same symptoms  Patient denies palpations at this time, reports some chest pain on the left side 3/10     Lima Cai is a 65-year-old female who presents to the emergency department after persistence of left-sided chest wall pain that is persistent throughout the morning  Onset of symptoms was early in the morning at approximately 3 AM on 5/29/2023  Patient stated that she tried different positions and resting at home with no significant improvement  She relates that the initial discomfort was negligible and thought that it would go away on its own  She states that then she had gone back to sleep, reawoke, and noted that the chest wall pain was still present and rates it as a 3 out of 10  Due to the persistence of the symptoms and unclear etiology as to why, she wished to come to the emergency department for further evaluation of symptoms  She denies any loss of consciousness, dizziness, changes in vision, or changes in hearing  She does describe that when she was feeling this chest discomfort she felt that that her heart was racing and there was a throbbing sensation in her ears  She had taken her blood pressure at that time and was in the 998J systolic and her heart rate was at the 90s  She states that she also had a pulse oximeter at home and had appropriate oxygenation in the mid 90s  She does have a history of COPD  She states that she has been taking her medications as prescribed with no recent changes in her prescribed routine medications  She does state that she did not take her morning medications prior to coming to the emergency department      On discussion with patient as well as review of electronic medical record, it is noted that the patient was also recently started on Levaquin for antibiotic therapy for resistant UTI noted on blood cultures that was shifted over from Keflex antibiotic to this new antibiotic for further treatment  She states that she has been taking this medication with no known difficulty as far as she was aware of  History provided by:  Patient   used: No    Chest Pain  Pain location:  L chest  Pain quality: aching and tightness    Pain radiates to:  Does not radiate  Pain radiates to the back: no    Pain severity:  Mild  Onset quality:  Sudden  Duration:  4 hours  Timing:  Constant  Progression:  Unchanged  Chronicity:  New  Context: at rest    Context: no movement and no stress    Relieved by:  Nothing  Worsened by:  Nothing tried  Ineffective treatments:  None tried  Associated symptoms: no abdominal pain, no back pain, no cough, no fever, no palpitations, no shortness of breath and not vomiting        Prior to Admission Medications   Prescriptions Last Dose Informant Patient Reported? Taking?    B-D ULTRAFINE III SHORT PEN 31G X 8 MM MISC  Self Yes No   Calcium Citrate 1040 MG TABS  Self Yes No   Sig: Take 500 mg by mouth Three times a day   Cranberry 200 MG CAPS   Yes No   Sig: Take 200 capsules by mouth 3 (three) times a day   DULoxetine (CYMBALTA) 30 mg delayed release capsule  Self Yes No   Sig: Take 60 mg by mouth daily   Icosapent Ethyl 1 g CAPS   Yes No   Sig: Take 1 capsule by mouth 2 (two) times a day   Probiotic Product (PROBIOTIC PO)  Self Yes No   Sig: Take 1 capsule by mouth 2 (two) times a day   Vitamin D, Ergocalciferol, 2000 units CAPS  Self Yes No   Sig: Take 2,000 Units by mouth daily    acetaminophen (TYLENOL) 325 mg tablet  Self No No   Sig: Take 3 tablets (975 mg total) by mouth every 8 (eight) hours   albuterol (Ventolin HFA) 90 mcg/act inhaler  Self No No   Sig: Inhale 2 puffs every 6 (six) hours as needed for wheezing   amLODIPine (NORVASC) 5 mg tablet   No No   Sig: Take 1 tablet (5 mg total) by mouth daily   b complex vitamins capsule  Self "Yes No   Sig: Take 1 capsule by mouth daily   Patient not taking: Reported on 5/24/2023   cephalexin (KEFLEX) 250 mg capsule   No No   Sig: Take 1 capsule (250 mg total) by mouth every 6 (six) hours for 5 days   ergocalciferol (ERGOCALCIFEROL) 1 25 MG (95609 UT) capsule   No No   Sig: Take 1 capsule (50,000 Units total) by mouth once a week Weekly x 8 weeks then Vit D 2000 iu daily OTC thereafter   estradiol (ESTRACE VAGINAL) 0 1 mg/g vaginal cream  Self No No   Sig: Apply pea sized amount around urethra and inside vaginal opening 3 times weekly   gabapentin (Neurontin) 300 mg capsule  Self No No   Sig: Take 1 capsule (300 mg total) by mouth daily at bedtime   insulin aspart (NovoLOG) 100 units/mL injection  Self Yes No   Sig: Inject under the skin 3 (three) times a day before meals 16 units, 16 units, 25 units     insulin detemir (Levemir FlexTouch) 100 Units/mL injection pen  Self Yes No   Sig: Inject 50 Units under the skin every evening    levofloxacin (LEVAQUIN) 500 mg tablet   No No   Sig: Take 1 tablet (500 mg total) by mouth every 24 hours for 7 days   levothyroxine 112 mcg tablet   Yes No   Sig: Take 112 mcg by mouth every morning   metoprolol tartrate (LOPRESSOR) 50 mg tablet   No No   Sig: Take 0 5 tablets (25 mg total) by mouth every 12 (twelve) hours   olmesartan (BENICAR) 40 mg tablet   No No   Sig: Take 1 tablet (40 mg total) by mouth daily   pantoprazole (PROTONIX) 40 mg tablet  Self No No   Sig: TAKE 1 TABLET BY MOUTH TWICE A DAY   pramipexole (MIRAPEX) 0 25 mg tablet  Self No No   Sig: Take 1 tablet (0 25 mg total) by mouth daily at bedtime   rosuvastatin (CRESTOR) 5 mg tablet   No No   Sig: Take 1 tablet (5 mg total) by mouth daily   torsemide (DEMADEX) 10 mg tablet   No No   Sig: Take 0 5 tablets (5 mg total) by mouth daily      Facility-Administered Medications: None       Past Medical History:   Diagnosis Date   • Allergic    • Allergic rhinitis    • Anesthesia     pt reports \"had to use double " "lumen endo tube for hiatal hernia repair/so surgeon could get to where he needed to work\"   • Arthritis    • Aspiration into airway    • Ahumada esophagus 1993    Lot of stress with children   • Basal cell carcinoma 2007    left cheek    • BCC (basal cell carcinoma) 05/27/2021    Left Nasal tip   • Cancer (HCC)     squamous cell cancer on forhead   • Cancer ([facility] )     basal cell on nose    • Cholelithiasis    • Chronic kidney disease 2000, 2018    Stones, kidney disease stage 4   • Chronic pain disorder     bilat feet and joint pain on occas   • Colon polyp    • Constipation    • COPD (chronic obstructive pulmonary disease) ([facility] )    • COVID-19 08/2021    recovered at home/did receive monoclonal infusion   • Dental bridge present    • Depression    • Diabetes mellitus ([facility] )     Type 2   • Diabetic neuropathy ([facility]  )    • Disease of thyroid gland    • Family history of thyroid problem    • Fatty liver    • GERD (gastroesophageal reflux disease)    • Heart burn    • Hiatal hernia    • History of pneumonia    • Hyperlipidemia    • Hyperplasia, parathyroid ([facility]  )    • Hypertension    • Hypotension 4/13/2022   • Kidney problem    • Kidney stone    • Memory loss Julu2 2020   • Motion sickness    • Motion sickness    • Neck pain    • Obesity 1978   • Obesity (BMI 30 0-34  9)    • Pollen allergies    • RLS (restless legs syndrome)    • SCC (squamous cell carcinoma) 05/04/2021    left mid forehead   • Seasonal allergies    • Sleep apnea     has inspire   • Squamous cell skin cancer 2007    left cheek    • Swollen ankles    • 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, left     great toe fusion   • Urinary incontinence    • Urinary tract infection 03/28/2022   • Wears glasses        Past Surgical History:   Procedure Laterality Date   • ABDOMINAL SURGERY  [id number],2520    Nissen x2 1972 tubal ligarion   • ARTHROSCOPY KNEE     • BREAST BIOPSY      stereotactic-benign   • BREAST BIOPSY      stereo-benign   • BREAST EXCISIONAL BIOPSY      " unknown date-benign   • BREAST EXCISIONAL BIOPSY      unknown date-benign   • BREAST EXCISIONAL BIOPSY      unknown date-benign   • BREAST EXCISIONAL BIOPSY      unknown age-benign   • CARDIAC CATHETERIZATION     • CHOLECYSTECTOMY     • COLONOSCOPY     • EXAMINATION UNDER ANESTHESIA N/A 06/24/2021    Procedure: EXAM UNDER ANESTHESIA (EUA), DISE;  Surgeon: Rohan Enciso MD;  [facility];  Service: ENT   • HERNIA REPAIR     • HIATAL HERNIA REPAIR     • KNEE SURGERY      Torn maniscus lap surg   • LIPOSUCTION     • LYMPH NODE BIOPSY     • MOHS SURGERY  05/20/2021    left mid forehead-Gautam   • MOHS SURGERY Left 05/27/2021    Left nasal tip- gautam    • OK LIGATION/BIOPSY TEMPORAL ARTERY Left 01/05/2023    Procedure: BIOPSY ARTERY TEMPORAL;  Surgeon: Thalia Barkley DO;  [facility];  Service: Vascular   • OK OPEN IMPLANTATION CRANIAL NERVE VASQUEZ & PULSE GEN N/A 11/10/2021    Procedure: INSERTION UPPER AIRWAY STIMULATOR, INSPIRE IMPLANT;  Surgeon: Rohan Enciso MD;  [facility];  Service: ENT   • OK UNLISTED PROCEDURE FOOT/TOES Right 07/19/2022    Procedure: 1st metatarsal phalangeal joint fusion;  Surgeon: Wilma Foley DPM;  Location[facility];  Service: Podiatry   • REDUCTION MAMMAPLASTY Bilateral 2000   • REDUCTION MAMMAPLASTY     • SKIN BIOPSY     • SKIN CANCER EXCISION  2007    squamous cell carcinoma    • SKIN CANCER EXCISION  2007    basal cell carcinoma   • SQUAMOUS CELL CARCINOMA EXCISION     • TOE SURGERY Right 08/04/2022    Right Great Toe Fusion   • TONSILLECTOMY     • TUBAL LIGATION     • UPPER GASTROINTESTINAL ENDOSCOPY     • US GUIDED BREAST BIOPSY RIGHT COMPLETE Right 07/18/2022       Family History   Problem Relation Age of Onset   • Heart disease Mother    • Depression Mother    • Hypertension Mother    • COPD Mother    • Hearing loss Mother    • Anxiety disorder Mother    • Heart disease Father    • Lung cancer Father 79        Smoker    • Cancer Father brain   • Alcohol abuse Father    • Dementia Father    • Hypertension Father    • Thyroid disease Father    • COPD Father    • Arthritis Father    • Brain cancer Father 76   • Hypertension Sister    • Diabetes Sister    • Heart disease Sister    • Thyroid disease Sister    • Cancer Sister         Lympoma, lung   • Lung cancer Sister 78   • Anxiety disorder Sister    • Hypertension Brother    • Diabetes Brother    • Cancer Brother         Throat   • Dementia Brother    • Stroke Brother    • Hypertension Brother    • Heart disease Brother    • Diabetes Brother    • Hypertension Brother    • No Known Problems Son    • No Known Problems Son    • Brain cancer Paternal Aunt         unknown age   • Diabetes Sister    • Hypertension Sister    • Diabetes Brother    • Breast cancer Neg Hx      I have reviewed and agree with the history as documented  E-Cigarette/Vaping   • E-Cigarette Use Never User      E-Cigarette/Vaping Substances   • Nicotine No    • THC No    • CBD No    • Flavoring No    • Other No    • Unknown No      Social History     Tobacco Use   • Smoking status: Former     Packs/day: 2 00     Years: 10 00     Total pack years: 20 00     Types: Cigarettes     Start date: 1968     Quit date: 1976     Years since quittin 4     Passive exposure: Past   • Smokeless tobacco: Never   • Tobacco comments:     Smoked 2 pack a day   Vaping Use   • Vaping Use: Never used   Substance Use Topics   • Alcohol use: Yes     Comment: 1 or 2 a year   • Drug use: No        Review of Systems   Constitutional: Negative for chills and fever  HENT: Negative for ear pain and sore throat  Eyes: Negative for pain and visual disturbance  Respiratory: Negative for cough and shortness of breath  Cardiovascular: Positive for chest pain  Negative for palpitations  Gastrointestinal: Negative for abdominal pain and vomiting  Genitourinary: Negative for dysuria and hematuria     Musculoskeletal: Negative for arthralgias and back pain  Skin: Negative for color change and rash  Neurological: Negative for seizures and syncope  All other systems reviewed and are negative  Physical Exam  ED Triage Vitals [05/29/23 0706]   Temperature Pulse Respirations Blood Pressure SpO2   98 6 °F (37 °C) 91 20 (!) 189/94 98 %      Temp Source Heart Rate Source Patient Position - Orthostatic VS BP Location FiO2 (%)   Oral Monitor Lying Right arm --      Pain Score       3             Orthostatic Vital Signs  Vitals:    05/29/23 0830 05/29/23 0900 05/29/23 0930 05/29/23 1030   BP: 140/73 155/82 153/73 139/74   Pulse: 76 75 75 74   Patient Position - Orthostatic VS:           Physical Exam  Vitals and nursing note reviewed  Constitutional:       General: She is not in acute distress  Appearance: She is well-developed  She is not ill-appearing  HENT:      Head: Normocephalic and atraumatic  Eyes:      Extraocular Movements: Extraocular movements intact  Conjunctiva/sclera: Conjunctivae normal       Pupils: Pupils are equal, round, and reactive to light  Cardiovascular:      Rate and Rhythm: Normal rate and regular rhythm  Heart sounds: Normal heart sounds  No murmur heard  No S3 or S4 sounds  Pulmonary:      Effort: Pulmonary effort is normal  No respiratory distress  Breath sounds: Normal breath sounds  No decreased breath sounds, wheezing, rhonchi or rales  Chest:      Chest wall: No mass, deformity or tenderness  Abdominal:      Palpations: Abdomen is soft  There is no mass  Tenderness: There is no abdominal tenderness  Musculoskeletal:         General: No swelling  Normal range of motion  Cervical back: Neck supple  Right lower leg: No tenderness  No edema  Left lower leg: No tenderness  No edema  Skin:     General: Skin is warm and dry  Capillary Refill: Capillary refill takes less than 2 seconds  Neurological:      General: No focal deficit present        Mental Status: She is alert and oriented to person, place, and time     Psychiatric:         Mood and Affect: Mood normal          ED Medications  Medications   acetaminophen (TYLENOL) tablet 975 mg (975 mg Oral Given 5/29/23 0721)       Diagnostic Studies  Results Reviewed     Procedure Component Value Units Date/Time    HS Troponin I 2hr [[id number]]  (Normal) Collected: 05/29/23 0925    Lab Status: Final result Specimen: Blood from Arm, Right Updated: 05/29/23 1000     hs TnI 2hr 7 ng/L      Delta 2hr hsTnI 0 ng/L     HS Troponin 0hr (reflex protocol) [[id number]]  (Normal) Collected: 05/29/23 0718    Lab Status: Final result Specimen: Blood from Arm, Left Updated: 05/29/23 0756     hs TnI 0hr 7 ng/L     Comprehensive metabolic panel [[id number]]  (Abnormal) Collected: 05/29/23 0718    Lab Status: Final result Specimen: Blood from Arm, Left Updated: 05/29/23 0750     Sodium 141 mmol/L      Potassium 3 8 mmol/L      Chloride 102 mmol/L      CO2 30 mmol/L      ANION GAP 9 mmol/L      BUN 27 mg/dL      Creatinine 2 00 mg/dL      Glucose 177 mg/dL      Calcium 9 3 mg/dL      AST 15 U/L      ALT 18 U/L      Alkaline Phosphatase 53 U/L      Total Protein 6 4 g/dL      Albumin 3 8 g/dL      Total Bilirubin 0 38 mg/dL      eGFR 24 ml/min/1 73sq m     Narrative:      Tawnya guidelines for Chronic Kidney Disease (CKD):   •  Stage 1 with normal or high GFR (GFR > 90 mL/min/1 73 square meters)  •  Stage 2 Mild CKD (GFR = 60-89 mL/min/1 73 square meters)  •  Stage 3A Moderate CKD (GFR = 45-59 mL/min/1 73 square meters)  •  Stage 3B Moderate CKD (GFR = 30-44 mL/min/1 73 square meters)  •  Stage 4 Severe CKD (GFR = 15-29 mL/min/1 73 square meters)  •  Stage 5 End Stage CKD (GFR <15 mL/min/1 73 square meters)  Note: GFR calculation is accurate only with a steady state creatinine    CBC and differential [[id number]]  (Abnormal) Collected: 05/29/23 0718    Lab Status: Final result Specimen: Blood from Arm, Left Updated: 05/29/23 0725     WBC 12 87 Thousand/uL      RBC 4 10 Million/uL      Hemoglobin 11 7 g/dL      Hematocrit 37 4 %      MCV 91 fL      MCH 28 5 pg      MCHC 31 3 g/dL      RDW 14 6 %      MPV 10 9 fL      Platelets 086 Thousands/uL      nRBC 0 /100 WBCs      Neutrophils Relative 72 %      Immat GRANS % 1 %      Lymphocytes Relative 19 %      Monocytes Relative 6 %      Eosinophils Relative 2 %      Basophils Relative 0 %      Neutrophils Absolute 9 25 Thousands/µL      Immature Grans Absolute 0 17 Thousand/uL      Lymphocytes Absolute 2 40 Thousands/µL      Monocytes Absolute 0 77 Thousand/µL      Eosinophils Absolute 0 23 Thousand/µL      Basophils Absolute 0 05 Thousands/µL                  XR chest 1 view portable   ED Interpretation by Moe Moore MD (05/29 0753)   Trachea appreciated to be midline  No gross enlargement of cardiac silhouette  Lung markings are appreciated to be throughout all lung fields  Increased haziness appreciated in the left lower lung fields  Difficult to appreciate costophrenic angle on left side  Able to appreciate costophrenic angle on right side  Implanted hardware appreciated as well as notation of ECG leads  No libia or obvious osseous pathology appreciated  Abnormal chest x-ray was compared to previous chest ray on April 25, 2023  Independently read and assessed by [facility]  ECG 12 Lead Documentation Only    Date/Time: 5/29/2023 7:16 AM    Performed by: Moe Moore MD  Authorized by: Moe Moore MD    Patient location:  ED  Previous ECG:     Previous ECG:  Compared to current    Similarity:  Changes noted    Comparison to cardiac monitor: Yes    Interpretation:     Interpretation: abnormal    Quality:     Tracing quality:  Limited by artifact  Rate:     ECG rate:  87    ECG rate assessment: normal    Rhythm:     Rhythm: sinus rhythm and A-V block    Ectopy:     Ectopy: PVCs      PVCs: Infrequent  QRS:     QRS axis:  Normal    QRS intervals:  Normal  Conduction:     Conduction: abnormal      Abnormal conduction: 1st degree    ST segments:     ST segments:  Normal  T waves:     T waves: non-specific            ED Course  ED Course as of 05/29/23 1545   Mon May 29, 2023   0725 Khadijah Delgadillo presents to the emergency department with signs and symptoms of left-sided chest wall pain that does not radiate  Rates it as a 3/10 in severity  Not associated with diaphoresis, radiation, or dizziness/lightheadedness  Patient wished to come to the emergency department for further work-up of her chest discomfort  Patient does have a history of hypertension has not taken any of her hypertensive medications this morning    0726 We will proceed with cardiac work-up including laboratory evaluation, EKG, and chest x-ray for evaluation of her symptomology  3837 Patient has no history of CHF or heart failure appreciated on review of electronic medical record  Patient provided with water for hydration while in the emergency department waiting for laboratory studies to populate    0576 Patient has poor renal function as evaluated on laboratory evaluation from previous lab draw  Estimated GFR in the 20s  We will proceed with acetaminophen therapy as opposed to anti-inflammatory/NSAID therapy for chest pain evaluation/management  0735 CBC and differential(!)  CBC notable for an isolation in elevated white blood cell count to 12 87  This elevation is appreciated to be mild by this provider  No other acute or remarkable changes on this laboratory evaluation  0753 XR chest 1 view portable  Trachea appreciated to be midline  No gross enlargement of cardiac silhouette  Lung markings are appreciated to be throughout all lung fields  Increased haziness appreciated in the left lower lung fields  Difficult to appreciate costophrenic angle on left side  Able to appreciate costophrenic angle on right side    Implanted hardware appreciated as well as notation of ECG leads  No libia or obvious osseous pathology appreciated  Abnormal chest x-ray was compared to previous chest ray on April 25, 2023  Independently read and assessed by Mario Olvera  6037 Comprehensive metabolic panel(!)  BUN and creatinine appreciated to be elevated  BUN appears improved when compared to previous CMP evaluation  Creatinine is also noted to be elevated but appears to be consistent with chronically elevated creatinine levels  0827 On reassessment of patient, blood pressure is downtrending to the 855F systolic, patient states that she feels well  Due to initial troponin value of 7, we will proceed with delta value at 2 hours  Anticipate discharge home if delta value was unremarkable or unchanged compared to previous  Patient states that her pain is well controlled at this time and feels comfortable  5656 Troponin collected at this time  Will await lab study to appreciate delta value               HEART Risk Score    Flowsheet Row Most Recent Value   Heart Score Risk Calculator    History 1 Filed at: 05/29/2023 1544   ECG 1 Filed at: 05/29/2023 1544   Age 2 Filed at: 05/29/2023 1544   Risk Factors 2 Filed at: 05/29/2023 1544   Troponin 0 Filed at: 05/29/2023 1544   HEART Score 6 Filed at: 05/29/2023 1544                          Daron Grossman Criteria for PE    Flowsheet Row Most Recent Value   Dany' Criteria for PE    Clinical signs and symptoms of DVT 0 Filed at: 05/29/2023 6724   PE is primary diagnosis or equally likely 0 Filed at: 05/29/2023 0725   HR >100 0 Filed at: 05/29/2023 0725   Immobilization at least 3 days or Surgery in the previous 4 weeks 0 Filed at: 05/29/2023 0725   Previous, objectively diagnosed PE or DVT 0 Filed at: 05/29/2023 0725   Hemoptysis 0 Filed at: 05/29/2023 0725   Malignancy with treatment within 6 months or palliative 0 Filed at: 05/29/2023 0725   Stefanie Criteria Total 0 Filed at: 05/29/2023 6627 Medical Decision Making  Chris Marcial is a 76-year-old female presents to the emergency department with left-sided chest pain  DDX including but not limited to: ACS, MI, PE, PTX, pneumonia, dissection, pleurisy, pericarditis, myocarditis, rhabdomyolysis, GI etiology  Based off of initial presenting complaints, laboratory evaluation and imaging studies paired with EKG tracing was also conducted  ECG tracing was notable for the presence of first-degree A-V conduction delay with an occasional PVC  Appears fairly similar to previous ECG when compared to a tracing conducted on April 25/2023  Nonspecific T wave abnormalities noted in the inferior leads but no acute ischemic changes or arrhythmias noted  Laboratory evaluation as dictated in the emergency department course of this note  Physical examination and onset of symptoms do not appear consistent with a dissection  No back pain  Patient is not working to breathe and has no acute respiratory distress as well as no tachycardia or hypoxia as well as a low Wells score  Lower suspicion for PE  Chest x-ray demonstrated mild changes in the left lower lung field that could be consistent with a pulmonary infection  Patient has no changes in her vitals and is already currently on Levaquin antibiotic therapy that she is taking for urinary tract infection  It was deemed that the patient should continue on this antibiotic therapy and cover for any potential infection/pulmonary findings at this time  This was not seen as a barrier to discharge  Patient was also not experiencing any cough or congestion while in the emergency department  Lower suspicion for this being a pulmonary infectious etiology potentially secondary to poor inspiratory effort on chest radiograph, but will continue with antibiotic therapy for empiric treatment which should cover for potential pulmonary sources of infection regardless  Delta value at 2 hours was unchanged    Patient was deemed stable for discharge home  Patient was provided with return precautions to follow-up with the emergency department for any worsening of symptoms  Patient was also encouraged to follow-up with her PCP and cardiologist as soon as possible  Patient agreed with this plan  Chest pain, unspecified: acute illness or injury     Details: Onset this morning  Patient has improved with blood pressure control in the emergency department  Has responded to over-the-counter medications  Laboratory evaluation unremarkable  Will discharge home with close PCP follow-up and cardiology follow-up  Amount and/or Complexity of Data Reviewed  External Data Reviewed: labs and notes  Details: I independently reviewed notation from outpatient evaluation regarding your UTI and previous medical evaluation to the outpatient setting  Reviewed previous laboratory studies in order to appreciate baseline for today's laboratory evaluation  Labs: ordered  Decision-making details documented in ED Course  Radiology: ordered and independent interpretation performed  Decision-making details documented in ED Course  ECG/medicine tests: ordered and independent interpretation performed  Details: ECG tracing documented in the procedure section of this note  Risk  OTC drugs  Disposition  Final diagnoses:   Chest pain, unspecified     Time reflects when diagnosis was documented in both MDM as applicable and the Disposition within this note     Time User Action Codes Description Comment    5/29/2023 10:06 AM Judge Guerra Add [R07 9] Chest pain, unspecified       ED Disposition     ED Disposition   Discharge    Condition   Stable    Date/Time   Mon May 29, 2023 10:06 AM    Comment   Rosa Fuentes discharge to home/self care                 Follow-up Information     Follow up With Specialties Details Why Contact Info Additional [address], [place] Internal Medicine Schedule an appointment as soon as possible for a visit  As needed [address] [facility] Emergency Department Emergency Medicine  As needed, If symptoms worsen [address] [facility] [address] [facility] Emergency Department, [address], [address], [address] Cardiology Schedule an appointment as soon as possible for a visit   [place] [id number] [address] Cardiology [address] [facility], [address] [place], [place], [address]          Discharge Medication List as of 5/29/2023 10:07 AM      CONTINUE these medications which have NOT CHANGED    Details   acetaminophen (TYLENOL) 325 mg tablet Take 3 tablets (975 mg total) by mouth every 8 (eight) hours, Starting Thu 3/23/2023, No Print      albuterol (Ventolin HFA) 90 mcg/act inhaler Inhale 2 puffs every 6 (six) hours as needed for wheezing, Starting Thu 8/4/2022, Normal      amLODIPine (NORVASC) 5 mg tablet Take 1 tablet (5 mg total) by mouth daily, Starting Wed 5/24/2023, Normal      b complex vitamins capsule Take 1 capsule by mouth daily, Historical Med      B-D ULTRAFINE III SHORT PEN 31G X 8 MM MISC Historical Med      Calcium Citrate 1040 MG TABS Take 500 mg by mouth Three times a day, Starting Wed 11/16/2022, Historical Med      cephalexin (KEFLEX) 250 mg capsule Take 1 capsule (250 mg total) by mouth every 6 (six) hours for 5 days, Starting Wed 5/24/2023, Until Mon 5/29/2023, Normal      Cranberry 200 MG CAPS Take 200 capsules by mouth 3 (three) times a day, Historical Med      DULoxetine (CYMBALTA) 30 mg delayed release capsule Take 60 mg by mouth daily, Starting Wed 9/7/2022, Historical Med      !! ergocalciferol (ERGOCALCIFEROL) 1 25 MG (21962 UT) capsule Take 1 capsule (50,000 Units total) by mouth once a week Weekly x 8 weeks then Vit D 2000 iu daily OTC thereafter, Starting Wed 5/24/2023, Normal      estradiol (ESTRACE VAGINAL) 0 1 mg/g vaginal cream Apply pea sized amount around urethra and inside vaginal opening 3 times weekly, Normal      gabapentin (Neurontin) 300 mg capsule Take 1 capsule (300 mg total) by mouth daily at bedtime, Starting Thu 3/30/2023, No Print      Icosapent Ethyl 1 g CAPS Take 1 capsule by mouth 2 (two) times a day, Starting Tue 4/4/2023, Historical Med      insulin aspart (NovoLOG) 100 units/mL injection Inject under the skin 3 (three) times a day before meals 16 units, 16 units, 25 units  , Historical Med      insulin detemir (Levemir FlexTouch) 100 Units/mL injection pen Inject 50 Units under the skin every evening , Starting Tue 6/8/2021, Historical Med      levofloxacin (LEVAQUIN) 500 mg tablet Take 1 tablet (500 mg total) by mouth every 24 hours for 7 days, Starting Fri 5/26/2023, Until Fri 6/2/2023, Normal      levothyroxine 112 mcg tablet Take 112 mcg by mouth every morning, Starting Sat 4/29/2023, Historical Med      metoprolol tartrate (LOPRESSOR) 50 mg tablet Take 0 5 tablets (25 mg total) by mouth every 12 (twelve) hours, Starting Tue 5/9/2023, Normal      olmesartan (BENICAR) 40 mg tablet Take 1 tablet (40 mg total) by mouth daily, Starting Wed 5/24/2023, Until Sun 7/23/2023, Normal      pantoprazole (PROTONIX) 40 mg tablet TAKE 1 TABLET BY MOUTH TWICE A DAY, Normal      pramipexole (MIRAPEX) 0 25 mg tablet Take 1 tablet (0 25 mg total) by mouth daily at bedtime, Starting Wed 2/15/2023, Normal      Probiotic Product (PROBIOTIC PO) Take 1 capsule by mouth 2 (two) times a day, Historical Med      rosuvastatin (CRESTOR) 5 mg tablet Take 1 tablet (5 mg total) by mouth daily, Starting Wed 5/24/2023, Normal      torsemide (DEMADEX) 10 mg tablet Take 0 5 tablets (5 mg total) by mouth daily, Starting Wed 5/24/2023, Until u 9/21/2023, Normal      !!  Vitamin D, Ergocalciferol, 2000 units CAPS Take 2,000 Units by mouth daily , Historical Med       !! - Potential duplicate medications found  Please discuss with provider  No discharge procedures on file  PDMP Review       Value Time User    PDMP Reviewed  Yes 3/24/2023  9:59 PM Pooja Dc MD           ED Provider  Attending physically available and evaluated Neel Alexandre I managed the patient along with the ED Attending      Electronically Signed by         Ankur Reynolds MD  05/29/23 3041

## 2023-05-29 NOTE — DISCHARGE INSTRUCTIONS
Return to the Emergency Department sooner if increased pain, fever, vomiting, difficulty breathing, rash  Please follow-up with your primary care provider as well as cardiology team as soon as possible for continued evaluation of your symptoms

## 2023-05-30 ENCOUNTER — VBI (OUTPATIENT)
Dept: ADMINISTRATIVE | Facility: OTHER | Age: 73
End: 2023-05-30

## 2023-05-30 NOTE — TELEPHONE ENCOUNTER
ED Visit Information     Ed visit date: 5/29/23  Diagnosis Description: chest pain  In Network? Yes Jimmie Standing  Discharge status: Home  Discharged with meds ? No  Number of ED visits to date: 4  ED Severity:3     Outreach Information    Outreach successful: Yes 2  Date letter mailed:n/a  Date Finalized:6/2/23    Care Coordination    Follow up appointment with pcp: no declined to schedule at this time  Transportation issues ?  No    Value Base Outreach    Emergent necessity warranted by diagnosis:  Yes  [facility] PCP:  Yes  Transportation:  Friend/Family Transport  Called PCP first?:  No  Feels able to call PCP for urgent problems ?:  Yes  Understands what emergencies can be handled by PCP ?:  Yes  Ever any problems getting appointment with PCP for minor emergency/urgency problems?:  No  Practice Contacted Patient ?:  No  Pt had ED follow up with pcp/staff ?:  No    Seen for follow-up out of network ?:  No  Urgent care Education?:  Yes

## 2023-06-01 ENCOUNTER — TELEPHONE (OUTPATIENT)
Dept: INTERNAL MEDICINE CLINIC | Facility: CLINIC | Age: 73
End: 2023-06-01

## 2023-06-01 NOTE — TELEPHONE ENCOUNTER
Please tell patient to resume torsemide 10mg daily, she was on 5mg and check weight daily   Thank you

## 2023-06-01 NOTE — TELEPHONE ENCOUNTER
Patient stopped at the office feels she has extra fluid ankle and lower leg non pitting [id number] 175 and she is stating she feels heavier in the upper body pleases check her torsemide was decreaseedc

## 2023-06-03 ENCOUNTER — HOSPITAL ENCOUNTER (EMERGENCY)
Facility: HOSPITAL | Age: 73
Discharge: HOME | End: 2023-06-03
Attending: EMERGENCY MEDICINE | Admitting: EMERGENCY MEDICINE
Payer: MEDICARE

## 2023-06-03 ENCOUNTER — APPOINTMENT (EMERGENCY)
Dept: RADIOLOGY | Facility: HOSPITAL | Age: 73
End: 2023-06-03
Payer: MEDICARE

## 2023-06-03 VITALS
SYSTOLIC BLOOD PRESSURE: 152 MMHG | OXYGEN SATURATION: 97 % | HEART RATE: 100 BPM | DIASTOLIC BLOOD PRESSURE: 83 MMHG | RESPIRATION RATE: 20 BRPM | TEMPERATURE: 98.8 F

## 2023-06-03 DIAGNOSIS — R07.9 CHEST PAIN, UNSPECIFIED TYPE: Primary | ICD-10-CM

## 2023-06-03 LAB
2HR DELTA HS TROPONIN: 1 NG/L
ALBUMIN SERPL BCP-MCNC: 3.7 G/DL (ref 3.5–5)
ALP SERPL-CCNC: 49 U/L (ref 34–104)
ALT SERPL W P-5'-P-CCNC: 10 U/L (ref 7–52)
ANION GAP SERPL CALCULATED.3IONS-SCNC: 10 MMOL/L (ref 4–13)
AST SERPL W P-5'-P-CCNC: 14 U/L (ref 13–39)
BASOPHILS # BLD AUTO: 0.04 THOUSANDS/ÂΜL (ref 0–0.1)
BASOPHILS NFR BLD AUTO: 0 % (ref 0–1)
BILIRUB SERPL-MCNC: 0.38 MG/DL (ref 0.2–1)
BUN SERPL-MCNC: 25 MG/DL (ref 5–25)
CALCIUM SERPL-MCNC: 8.4 MG/DL (ref 8.4–10.2)
CARDIAC TROPONIN I PNL SERPL HS: 5 NG/L
CARDIAC TROPONIN I PNL SERPL HS: 6 NG/L
CHLORIDE SERPL-SCNC: 102 MMOL/L (ref 96–108)
CO2 SERPL-SCNC: 25 MMOL/L (ref 21–32)
CREAT SERPL-MCNC: 2.26 MG/DL (ref 0.6–1.3)
EOSINOPHIL # BLD AUTO: 0.21 THOUSAND/ÂΜL (ref 0–0.61)
EOSINOPHIL NFR BLD AUTO: 2 % (ref 0–6)
ERYTHROCYTE [DISTWIDTH] IN BLOOD BY AUTOMATED COUNT: 14.9 % (ref 11.6–15.1)
GFR SERPL CREATININE-BSD FRML MDRD: 20 ML/MIN/1.73SQ M
GLUCOSE SERPL-MCNC: 214 MG/DL (ref 65–140)
HCT VFR BLD AUTO: 35.4 % (ref 34.8–46.1)
HGB BLD-MCNC: 11.2 G/DL (ref 11.5–15.4)
IMM GRANULOCYTES # BLD AUTO: 0.06 THOUSAND/UL (ref 0–0.2)
IMM GRANULOCYTES NFR BLD AUTO: 1 % (ref 0–2)
LYMPHOCYTES # BLD AUTO: 2.01 THOUSANDS/ÂΜL (ref 0.6–4.47)
LYMPHOCYTES NFR BLD AUTO: 21 % (ref 14–44)
MCH RBC QN AUTO: 28.7 PG (ref 26.8–34.3)
MCHC RBC AUTO-ENTMCNC: 31.6 G/DL (ref 31.4–37.4)
MCV RBC AUTO: 91 FL (ref 82–98)
MONOCYTES # BLD AUTO: 0.49 THOUSAND/ÂΜL (ref 0.17–1.22)
MONOCYTES NFR BLD AUTO: 5 % (ref 4–12)
NEUTROPHILS # BLD AUTO: 6.96 THOUSANDS/ÂΜL (ref 1.85–7.62)
NEUTS SEG NFR BLD AUTO: 71 % (ref 43–75)
NRBC BLD AUTO-RTO: 0 /100 WBCS
PLATELET # BLD AUTO: 243 THOUSANDS/UL (ref 149–390)
PMV BLD AUTO: 10.9 FL (ref 8.9–12.7)
POTASSIUM SERPL-SCNC: 3.7 MMOL/L (ref 3.5–5.3)
PROT SERPL-MCNC: 6.2 G/DL (ref 6.4–8.4)
RBC # BLD AUTO: 3.9 MILLION/UL (ref 3.81–5.12)
SODIUM SERPL-SCNC: 137 MMOL/L (ref 135–147)
WBC # BLD AUTO: 9.77 THOUSAND/UL (ref 4.31–10.16)

## 2023-06-03 PROCEDURE — 99285 EMERGENCY DEPT VISIT HI MDM: CPT

## 2023-06-03 PROCEDURE — 84484 ASSAY OF TROPONIN QUANT: CPT

## 2023-06-03 PROCEDURE — 96365 THER/PROPH/DIAG IV INF INIT: CPT

## 2023-06-03 PROCEDURE — 96366 THER/PROPH/DIAG IV INF ADDON: CPT

## 2023-06-03 PROCEDURE — 80053 COMPREHEN METABOLIC PANEL: CPT

## 2023-06-03 PROCEDURE — 85025 COMPLETE CBC W/AUTO DIFF WBC: CPT

## 2023-06-03 PROCEDURE — 71045 X-RAY EXAM CHEST 1 VIEW: CPT

## 2023-06-03 PROCEDURE — 93005 ELECTROCARDIOGRAM TRACING: CPT

## 2023-06-03 PROCEDURE — 36415 COLL VENOUS BLD VENIPUNCTURE: CPT

## 2023-06-03 RX ORDER — PREDNISONE 20 MG/1
TABLET ORAL
COMMUNITY
Start: 2023-05-13 | End: 2023-06-03

## 2023-06-03 RX ORDER — ACETAMINOPHEN 325 MG/1
975 TABLET ORAL ONCE
Status: COMPLETED | OUTPATIENT
Start: 2023-06-03 | End: 2023-06-03

## 2023-06-03 RX ADMIN — ACETAMINOPHEN 975 MG: 325 TABLET ORAL at 08:46

## 2023-06-03 RX ADMIN — SODIUM CHLORIDE, SODIUM LACTATE, POTASSIUM CHLORIDE, AND CALCIUM CHLORIDE 1000 ML: .6; .31; .03; .02 INJECTION, SOLUTION INTRAVENOUS at 07:11

## 2023-06-03 NOTE — ED ATTENDING ATTESTATION
6/3/2023  I, Al Springer MD, saw and evaluated the patient  I have discussed the patient with the resident/non-physician practitioner and agree with the resident's/non-physician practitioner's findings, Plan of Care, and MDM as documented in the resident's/non-physician practitioner's note, except where noted  All available labs and Radiology studies were reviewed  I was present for key portions of any procedure(s) performed by the resident/non-physician practitioner and I was immediately available to provide assistance  At this point I agree with the current assessment done in the Emergency Department  I have conducted an independent evaluation of this patient a history and physical is as follows: This was a 67-year-old female presenting to the ED for palpitations  Patient has presented for the second time in the past week for similar symptoms  She states that she woke up after having a nightmare, and had this palpitations  She denies any other significantly associated symptoms  Her exam otherwise is unremarkable  Her differential diagnosis includes: Atypical ACS versus electrolyte abnormality versus anxiety versus other  Her CBC, metabolic panel, chest x-ray, EKG all were unremarkable  Patient had bloodwork, plainfilms, ekg all interpreted by myself  Patient signed out at shift change pending serial troponin      ED Course         Critical Care Time  Procedures

## 2023-06-03 NOTE — DISCHARGE INSTRUCTIONS
Return to the Emergency Department sooner if increased pain, fever, vomiting, difficulty breathing, rash  He is continue to utilize over-the-counter medications as needed for improvement in your symptoms  Please follow-up with your cardiologist as well as primary care provider for continued evaluation of your symptoms as well as your elevation in kidney values

## 2023-06-03 NOTE — ED PROVIDER NOTES
"History  Chief Complaint   Patient presents with   • Palpitations     Pt reports waking up with palpitations and feeling like heart racing  States was here on Monday for same symptoms  Sim Osiris is a 76-year-old female who presents to the emergency department after awakening in the morning with episodes of palpitations and feeling \"like her heart was racing\" she states that this is similar to previous symptomology that brought her to the emergency department earlier in the week  She states that it is not associated with pain or shortness of breath  She states prior to awakening from sleep, she was describing an increased sensation of fear and tear that appeared consistent with a nightmare  She is unsure if this etiology was potentially associated with how her heart was racing earlier in the morning  She does describe that she had not followed up or attempted to establish care with a cardiology provider in the outpatient setting after the previous evaluation in the emergency department  No recent trauma  No recent sick contacts or changes in her medications  At time of presentation to the emergency department, she feels that her heart rate is come back to a normal range and is not experiencing any shortness of breath  History provided by:  Patient and spouse      Prior to Admission Medications   Prescriptions Last Dose Informant Patient Reported? Taking?    B-D ULTRAFINE III SHORT PEN 31G X 8 MM MISC  Self Yes No   Calcium Citrate 1040 MG TABS  Self Yes No   Sig: Take 500 mg by mouth Three times a day   Cranberry 200 MG CAPS   Yes No   Sig: Take 200 capsules by mouth 3 (three) times a day   DULoxetine (CYMBALTA) 30 mg delayed release capsule  Self Yes No   Sig: Take 60 mg by mouth daily   Icosapent Ethyl 1 g CAPS   Yes No   Sig: Take 1 capsule by mouth 2 (two) times a day   Probiotic Product (PROBIOTIC PO)  Self Yes No   Sig: Take 1 capsule by mouth 2 (two) times a day   Vitamin D, Ergocalciferol, 2000 " units CAPS  Self Yes No   Sig: Take 2,000 Units by mouth daily    acetaminophen (TYLENOL) 325 mg tablet  Self No No   Sig: Take 3 tablets (975 mg total) by mouth every 8 (eight) hours   albuterol (Ventolin HFA) 90 mcg/act inhaler  Self No No   Sig: Inhale 2 puffs every 6 (six) hours as needed for wheezing   amLODIPine (NORVASC) 5 mg tablet   No No   Sig: Take 1 tablet (5 mg total) by mouth daily   b complex vitamins capsule  Self Yes No   Sig: Take 1 capsule by mouth daily   Patient not taking: Reported on 5/24/2023   ergocalciferol (ERGOCALCIFEROL) 1 25 MG (65446 UT) capsule   No No   Sig: Take 1 capsule (50,000 Units total) by mouth once a week Weekly x 8 weeks then Vit D 2000 iu daily OTC thereafter   estradiol (ESTRACE VAGINAL) 0 1 mg/g vaginal cream  Self No No   Sig: Apply pea sized amount around urethra and inside vaginal opening 3 times weekly   gabapentin (Neurontin) 300 mg capsule  Self No No   Sig: Take 1 capsule (300 mg total) by mouth daily at bedtime   insulin aspart (NovoLOG) 100 units/mL injection  Self Yes No   Sig: Inject under the skin 3 (three) times a day before meals 16 units, 16 units, 25 units     insulin detemir (Levemir FlexTouch) 100 Units/mL injection pen  Self Yes No   Sig: Inject 50 Units under the skin every evening    levothyroxine 112 mcg tablet   Yes No   Sig: Take 112 mcg by mouth every morning   metoprolol tartrate (LOPRESSOR) 50 mg tablet   No No   Sig: Take 0 5 tablets (25 mg total) by mouth every 12 (twelve) hours   olmesartan (BENICAR) 40 mg tablet   No No   Sig: Take 1 tablet (40 mg total) by mouth daily   pantoprazole (PROTONIX) 40 mg tablet  Self No No   Sig: TAKE 1 TABLET BY MOUTH TWICE A DAY   pramipexole (MIRAPEX) 0 25 mg tablet  Self No No   Sig: Take 1 tablet (0 25 mg total) by mouth daily at bedtime   rosuvastatin (CRESTOR) 5 mg tablet   No No   Sig: Take 1 tablet (5 mg total) by mouth daily   torsemide (DEMADEX) 10 mg tablet   No No   Sig: Take 0 5 tablets (5 mg "total) by mouth daily      Facility-Administered Medications: None       Past Medical History:   Diagnosis Date   • Allergic    • Allergic rhinitis    • Anesthesia     pt reports \"had to use double lumen endo tube for hiatal hernia repair/so surgeon could get to where he needed to work\"   • Arthritis    • Aspiration into airway    • Ahumada esophagus 1993    Lot of stress with children   • Basal cell carcinoma 2007    left cheek    • BCC (basal cell carcinoma) 05/27/2021    Left Nasal tip   • Cancer (HCC)     squamous cell cancer on forhead   • Cancer ([facility] )     basal cell on nose    • Cholelithiasis    • Chronic kidney disease 2000, 2018    Stones, kidney disease stage 4   • Chronic pain disorder     bilat feet and joint pain on occas   • Colon polyp    • Constipation    • COPD (chronic obstructive pulmonary disease) ([facility]  )    • COVID-19 08/2021    recovered at home/did receive monoclonal infusion   • Dental bridge present    • Depression    • Diabetes mellitus ([place] [address] )     Type 2   • Diabetic neuropathy ([place] [address] )    • Disease of thyroid gland    • Family history of thyroid problem    • Fatty liver    • GERD (gastroesophageal reflux disease)    • Heart burn    • Hiatal hernia    • History of pneumonia    • Hyperlipidemia    • Hyperplasia, parathyroid ([facility]  )    • Hypertension    • Hypotension 4/13/2022   • Kidney problem    • Kidney stone    • Memory loss Julu2 2020   • Motion sickness    • Motion sickness    • Neck pain    • Obesity 1978   • Obesity (BMI 30 0-34  9)    • Pollen allergies    • RLS (restless legs syndrome)    • SCC (squamous cell carcinoma) 05/04/2021    left mid forehead   • Seasonal allergies    • Sleep apnea     has inspire   • Squamous cell skin cancer 2007    left cheek    • Swollen ankles    • 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, left     great toe fusion   • Urinary incontinence    • Urinary tract infection 03/28/2022   • Wears glasses        Past Surgical History:   Procedure Laterality Date   • " ABDOMINAL SURGERY  [id number]    Nissen x2 1972 tubal ligarion   • ARTHROSCOPY KNEE     • BREAST BIOPSY      stereotactic-benign   • BREAST BIOPSY      stereo-benign   • BREAST EXCISIONAL BIOPSY      unknown date-benign   • BREAST EXCISIONAL BIOPSY      unknown date-benign   • BREAST EXCISIONAL BIOPSY      unknown date-benign   • BREAST EXCISIONAL BIOPSY      unknown age-benign   • CARDIAC CATHETERIZATION     • CHOLECYSTECTOMY     • COLONOSCOPY     • EXAMINATION UNDER ANESTHESIA N/A 06/24/2021    Procedure: EXAM UNDER ANESTHESIA (EUA), DISE;  Surgeon: Joe Levin MD;  [facility];  Service: ENT   • HERNIA REPAIR     • HIATAL HERNIA REPAIR     • KNEE SURGERY      Torn maniscus lap surg   • LIPOSUCTION     • LYMPH NODE BIOPSY     • MOHS SURGERY  05/20/2021    left mid forehead-Gautam   • MOHS SURGERY Left 05/27/2021    Left nasal tip- gautam    • CO LIGATION/BIOPSY TEMPORAL ARTERY Left 01/05/2023    Procedure: BIOPSY ARTERY TEMPORAL;  Surgeon: Ivory Sparrow DO;  [facility];  Service: Vascular   • CO OPEN IMPLANTATION CRANIAL NERVE VASQUEZ & PULSE GEN N/A 11/10/2021    Procedure: INSERTION UPPER AIRWAY STIMULATOR, INSPIRE IMPLANT;  Surgeon: Joe Levin MD;  [facility];  Service: ENT   • CO UNLISTED PROCEDURE FOOT/TOES Right 07/19/2022    Procedure: 1st metatarsal phalangeal joint fusion;  Surgeon: Jluis Marcos DPM;  [facility];  Service: Podiatry   • REDUCTION MAMMAPLASTY Bilateral 2000   • REDUCTION MAMMAPLASTY     • SKIN BIOPSY     • SKIN CANCER EXCISION  2007    squamous cell carcinoma    • SKIN CANCER EXCISION  2007    basal cell carcinoma   • SQUAMOUS CELL CARCINOMA EXCISION     • TOE SURGERY Right 08/04/2022    Right Great Toe Fusion   • TONSILLECTOMY     • TUBAL LIGATION     • UPPER GASTROINTESTINAL ENDOSCOPY     • US GUIDED BREAST BIOPSY RIGHT COMPLETE Right 07/18/2022       Family History   Problem Relation Age of Onset   • Heart disease Mother    • Depression Mother    • Hypertension Mother    • COPD Mother    • Hearing loss Mother    • Anxiety disorder Mother    • Heart disease Father    • Lung cancer Father 79        Smoker    • Cancer Father         brain   • Alcohol abuse Father    • Dementia Father    • Hypertension Father    • Thyroid disease Father    • COPD Father    • Arthritis Father    • Brain cancer Father 76   • Hypertension Sister    • Diabetes Sister    • Heart disease Sister    • Thyroid disease Sister    • Cancer Sister         Lympoma, lung   • Lung cancer Sister 78   • Anxiety disorder Sister    • Hypertension Brother    • Diabetes Brother    • Cancer Brother         Throat   • Dementia Brother    • Stroke Brother    • Hypertension Brother    • Heart disease Brother    • Diabetes Brother    • Hypertension Brother    • No Known Problems Son    • No Known Problems Son    • Brain cancer Paternal Aunt         unknown age   • Diabetes Sister    • Hypertension Sister    • Diabetes Brother    • Breast cancer Neg Hx      I have reviewed and agree with the history as documented  E-Cigarette/Vaping   • E-Cigarette Use Never User      E-Cigarette/Vaping Substances   • Nicotine No    • THC No    • CBD No    • Flavoring No    • Other No    • Unknown No      Social History     Tobacco Use   • Smoking status: Former     Packs/day: 2 00     Years: 10 00     Total pack years: 20 00     Types: Cigarettes     Start date: 1968     Quit date: 1976     Years since quittin 4     Passive exposure: Past   • Smokeless tobacco: Never   • Tobacco comments:     Smoked 2 pack a day   Vaping Use   • Vaping Use: Never used   Substance Use Topics   • Alcohol use: Yes     Comment: 1 or 2 a year   • Drug use: No        Review of Systems   Constitutional: Negative for chills and fever  HENT: Negative for ear pain and sore throat  Eyes: Negative for pain and visual disturbance  Respiratory: Negative for cough and shortness of breath      Cardiovascular: Negative for chest pain and palpitations  Gastrointestinal: Negative for abdominal pain and vomiting  Genitourinary: Negative for dysuria and hematuria  Musculoskeletal: Negative for arthralgias and back pain  Skin: Negative for color change and rash  Neurological: Negative for seizures and syncope  All other systems reviewed and are negative  Physical Exam  ED Triage Vitals   Temperature Pulse Respirations Blood Pressure SpO2   06/03/23 0610 06/03/23 0607 06/03/23 0607 06/03/23 0610 06/03/23 0607   98 8 °F (37 1 °C) 100 20 152/83 97 %      Temp Source Heart Rate Source Patient Position - Orthostatic VS BP Location FiO2 (%)   06/03/23 0610 06/03/23 0607 -- -- --   Oral Monitor         Pain Score       --                    Orthostatic Vital Signs  Vitals:    06/03/23 0607 06/03/23 0610   BP:  152/83   Pulse: 100        Physical Exam  Vitals and nursing note reviewed  Constitutional:       General: She is not in acute distress  Appearance: Normal appearance  She is well-developed  She is not ill-appearing  HENT:      Head: Normocephalic and atraumatic  Right Ear: External ear normal       Left Ear: External ear normal       Nose: Nose normal  No congestion  Mouth/Throat:      Mouth: Mucous membranes are moist    Eyes:      Conjunctiva/sclera: Conjunctivae normal    Cardiovascular:      Rate and Rhythm: Normal rate and regular rhythm  Pulses: Normal pulses  Heart sounds: No murmur heard  Pulmonary:      Effort: Pulmonary effort is normal  No respiratory distress  Breath sounds: Normal breath sounds  Abdominal:      Palpations: Abdomen is soft  Tenderness: There is no abdominal tenderness  There is no guarding or rebound  Musculoskeletal:         General: No swelling, deformity or signs of injury  Cervical back: Neck supple  Skin:     General: Skin is warm and dry  Capillary Refill: Capillary refill takes less than 2 seconds     Neurological: General: No focal deficit present  Mental Status: She is alert and oriented to person, place, and time     Psychiatric:         Mood and Affect: Mood normal          ED Medications  Medications   lactated ringers bolus 1,000 mL (1,000 mL Intravenous New Bag 6/3/23 0711)   acetaminophen (TYLENOL) tablet 975 mg (975 mg Oral Given 6/3/23 0846)       Diagnostic Studies  Results Reviewed     Procedure Component Value Units Date/Time    HS Troponin I 2hr [[id number]]  (Normal) Collected: 06/03/23 0856    Lab Status: Final result Specimen: Blood from Arm, Left Updated: 06/03/23 0936     hs TnI 2hr 6 ng/L      Delta 2hr hsTnI 1 ng/L     HS Troponin 0hr (reflex protocol) [[id number]]  (Normal) Collected: 06/03/23 0621    Lab Status: Final result Specimen: Blood from Arm, Left Updated: 06/03/23 0654     hs TnI 0hr 5 ng/L     Comprehensive metabolic panel [[id number]]  (Abnormal) Collected: 06/03/23 0621    Lab Status: Final result Specimen: Blood from Arm, Left Updated: 06/03/23 0646     Sodium 137 mmol/L      Potassium 3 7 mmol/L      Chloride 102 mmol/L      CO2 25 mmol/L      ANION GAP 10 mmol/L      BUN 25 mg/dL      Creatinine 2 26 mg/dL      Glucose 214 mg/dL      Calcium 8 4 mg/dL      AST 14 U/L      ALT 10 U/L      Alkaline Phosphatase 49 U/L      Total Protein 6 2 g/dL      Albumin 3 7 g/dL      Total Bilirubin 0 38 mg/dL      eGFR 20 ml/min/1 73sq m     Narrative:      [place] guidelines for Chronic Kidney Disease (CKD):   •  Stage 1 with normal or high GFR (GFR > 90 mL/min/1 73 square meters)  •  Stage 2 Mild CKD (GFR = 60-89 mL/min/1 73 square meters)  •  Stage 3A Moderate CKD (GFR = 45-59 mL/min/1 73 square meters)  •  Stage 3B Moderate CKD (GFR = 30-44 mL/min/1 73 square meters)  •  Stage 4 Severe CKD (GFR = 15-29 mL/min/1 73 square meters)  •  Stage 5 End Stage CKD (GFR <15 mL/min/1 73 square meters)  Note: GFR calculation is accurate only with a steady state creatinine    CBC and differential [[id number]]  (Abnormal) Collected: 06/03/23 0621    Lab Status: Final result Specimen: Blood from Arm, Left Updated: 06/03/23 0632     WBC 9 77 Thousand/uL      RBC 3 90 Million/uL      Hemoglobin 11 2 g/dL      Hematocrit 35 4 %      MCV 91 fL      MCH 28 7 pg      MCHC 31 6 g/dL      RDW 14 9 %      MPV 10 9 fL      Platelets 198 Thousands/uL      nRBC 0 /100 WBCs      Neutrophils Relative 71 %      Immat GRANS % 1 %      Lymphocytes Relative 21 %      Monocytes Relative 5 %      Eosinophils Relative 2 %      Basophils Relative 0 %      Neutrophils Absolute 6 96 Thousands/µL      Immature Grans Absolute 0 06 Thousand/uL      Lymphocytes Absolute 2 01 Thousands/µL      Monocytes Absolute 0 49 Thousand/µL      Eosinophils Absolute 0 21 Thousand/µL      Basophils Absolute 0 04 Thousands/µL                  XR chest 1 view portable   ED Interpretation by Devendra Hanks MD (06/03 6801)   No acute cardiopulmonary pathology appreciated on chest radiograph  Independently read and assessed by Ina De La Garza  ECG 12 Lead Documentation Only    Date/Time: 6/3/2023 9:52 AM    Performed by: Devendra Hanks MD  Authorized by: Devendra Hanks MD    ECG reviewed by me, the ED Provider: yes    Patient location:  ED  Previous ECG:     Previous ECG:  Compared to current    Similarity:  No change    Comparison to cardiac monitor: Yes    Interpretation:     Interpretation: abnormal    Quality:     Tracing quality:  Limited by artifact  Rate:     ECG rate:  96    ECG rate assessment: normal    Rhythm:     Rhythm: sinus rhythm    Ectopy:     Ectopy: none    QRS:     QRS axis:  Normal    QRS intervals:  Normal  Conduction:     Conduction: abnormal      Abnormal conduction: 1st degree    ST segments:     ST segments:  Normal  T waves:     T waves: normal            ED Course                                       Medical Decision Making  He presents to the emergency department with recurrence of palpitations  DDx including but not limited to: metabolic abnormality, cardiac arrhythmia, ACS, MI,  thyroid disease, PE, anxiety, adverse reaction  Based off of initial presenting complaints, laboratory evaluation was conducted and was grossly unremarkable  Unremarkable delta evaluation both time 0 and 2 hours  Patient deemed stable for discharge home  ECG tracing as well as chest radiograph demonstrated no acute change or interval change when compared to previous evaluation  Lower suspicion for metabolic abnormality, cardiac arrhythmia, ACS, MI  Lower suspicion for thyroid disease or PE given the patient's history as well as resolution of symptoms  Anxiety may be a contributing factor but is not deemed by this provider to be the primary discomfort  It was reemphasized to the patient that it would be in her best interest to follow-up with her cardiology provider as well as for continued evaluation and by her primary care provider  It was also discussed with the patient that her kidney function was uptrending  Although she did not meet criteria for acute kidney injury at this time, it was deemed that the patient would benefit from further conversation and discussion with her nephrology team     Chest pain, unspecified type: acute illness or injury  Amount and/or Complexity of Data Reviewed  Labs: ordered  Details: Laboratory evaluation grossly unremarkable  Radiology: ordered and independent interpretation performed  Details: Radiograph of the chest unremarkable  ECG/medicine tests: ordered and independent interpretation performed  Details: ECG tracing documented procedure section of this note  No acute ischemic changes  Risk  OTC drugs              Disposition  Final diagnoses:   Chest pain, unspecified type     Time reflects when diagnosis was documented in both MDM as applicable and the Disposition within this note     Time User Action Codes Description Comment    6/3/2023  9:38 AM Diony English Add [R07 9] Chest pain, unspecified type       ED Disposition     ED Disposition   Discharge    Condition   Stable    Date/Time   Sat Timo 3, 2023  9:38 AM    Comment   [place] G[place] discharge to home/self care  Follow-up Information     Follow up With Specialties Details Why Contact Info Additional Information    Miriam Buckner MD Internal Medicine Schedule an appointment as soon as possible for a visit  As needed [facility] Emergency Department Emergency Medicine  As needed, If symptoms worsen [address] Emergency Department, [address], [place], [place], [address]          Patient's Medications   Discharge Prescriptions    No medications on file     No discharge procedures on file  PDMP Review       Value Time User    PDMP Reviewed  Yes 3/24/2023  9:59 PM Darylene Gaudier, MD           ED Provider  Attending physically available and evaluated Sharron Calix I managed the patient along with the ED Attending      Electronically Signed by         Shayna Alan MD  06/03/23 0029

## 2023-06-04 LAB
ATRIAL RATE: 96 BPM
P AXIS: 63 DEGREES
PR INTERVAL: 218 MS
QRS AXIS: -8 DEGREES
QRSD INTERVAL: 68 MS
QT INTERVAL: 346 MS
QTC INTERVAL: 437 MS
T WAVE AXIS: 17 DEGREES
VENTRICULAR RATE: 96 BPM

## 2023-06-04 PROCEDURE — 93010 ELECTROCARDIOGRAM REPORT: CPT | Performed by: INTERNAL MEDICINE

## 2023-06-05 ENCOUNTER — DOCUMENTATION (OUTPATIENT)
Dept: NEPHROLOGY | Facility: CLINIC | Age: 73
End: 2023-06-05

## 2023-06-05 ENCOUNTER — TELEPHONE (OUTPATIENT)
Dept: NEPHROLOGY | Facility: CLINIC | Age: 73
End: 2023-06-05

## 2023-06-05 RX ORDER — VITAMIN B COMPLEX
100 TABLET ORAL DAILY
COMMUNITY

## 2023-06-05 NOTE — TELEPHONE ENCOUNTER
Patient called as she is experiencing pain in her joints since adding rosuvastatin  She is not currently taking Co Q 10  Please advise

## 2023-06-05 NOTE — TELEPHONE ENCOUNTER
Spoke with patient about the following, she expressed understanding and thanked us for the call:    Try holding it for a week once her joints are better change rosuvastatin to 5 mg just Monday Wednesday and Friday and take co-Q10 100 mg daily let us know how she feels

## 2023-06-05 NOTE — PROGRESS NOTES
Cardiology  Acute  Office Visit Note  Evelyn Marshall   75 y o    female   MRN: [id number]  [address]  [address] [place] [address]  [phone]    PCP: Marguerite Garcias MD  Cardiologist: Dr Torres [facility]            Summary of recommendations  -Heart healthy diet  Educational information provided  -Increase metoprolol to 50 mg q12  -pharmacological nuclear stress test   If abnormal, she would like a conversation with her cardiologist about what to do next- currently she is a DNR  -Needs more aggressive risk factor modification particularly her lipids and elevated triglycerides  Currently her statin therapy is on hold given some muscle aches  She is taking a holiday to see if it would improve  -Encouraged hydration within her fluid allotment, 64 ounces a day  -Resume treatment with Inspire for BARBARA  -Follow up will be scheduled with Dr Namrata Peralta screening: 10/11/2021       Assessment/plan  Chest pressure, intermittent  Cannot recall if she feels it concurrently with the palpitations or not  Palpitations  On metoprolol tartrate 25 mg every 12  · EKG: NSr 73  Frequent PVCs  · Nuclear stress test  · I recommend she increase potassium containing foods, and her diet  Hypertension, essential   BP  134/74  on amlodipine 5 mg daily, metoprolol tartrate 25 mg every 12, olmesartan 40 mg daily, loop diuretic  Increase BB given PVCs and palpitations  Hyperlipidemia mixed, on rosuvastatin 5 mg daily, icosapent ethyl 2 g twice daily  5/23/2023: Triglycerides 641  Direct LDL 87   · Both rosuvastatin and atorvastatin are indicated for hypertriglyceridemia  · She is currently on a drug holiday to see if being off rosuvastatin will help her muscle aches  If not, I recommend uptitration  She can try co-Q10 with it, at a dose of 100 mg every 12     Latest Reference Range & Units 09/19/22 08:24 05/23/23 10:26   Cholesterol See Comment mg/dL 103 279 (H)   Triglycerides See Comment mg/dL 226 (H) 641 (H)   HDL >=50 mg/dL 37 (L) 50   LDL Calculated 0 - 100 mg/dL 21 See Comment   LDL CHOLESTEROL DIRECT 0 - 100 mg/dl  87   Type 2 diabetes mellitus on insulin  3/7/23: Hemoglobin A1C 6 8  Obesity, BMI 30  BARBARA  COPD  CKD 4  baseline Cr around 1 7  More recently in the 2s  Encouraged to drink within her allotment- 64 oz a day  Hypothyroidism on replacement  History tobacco, in remission  Prior 10-pack-year, 2 packs/day, quitting in 1976  Cardiac testing  · TTE 5/2020  EF 55%  No RWMA although this possibly could not be excluded on the basis of the study  LAE  Normal RV size and function  There is no clear evidence of significant pulmonary vascular disease by noninvasive assessment  PASP<40 mmHg  Notably, there is mild IVS septal bounce which may be related to HFpEF  • NMR myocardial SPECT September 2021Normal study after pharmacologic vasodilation without reproduction of symptoms  Myocardial perfusion imaging was normal at rest and with stress  Left ventricular systolic function was normal                       HPI  Angela Pena is a 74 yo female with hypertension, dyslipidemia, diabetes, BARBARA, CKD 4, COPD, hypothyroidism on replacement  She is obese, BMI 30     Given a history of chest pain, she underwent a stress test in September of 2021 that showed no evidence of ischemia  Given CKD 4, her nephrologist has been managing her hypertension  She follows with Dr Dk Villavicencio  She was last seen in the office October 2022      ED Adm 5/29  CC: chest pain  Woke up with palpitations at 3 AM, chest discomfort  Woke up at 6 AM symptoms  EKG normal sinus rhythm  PVCs  Nonspecific T wave changes  Pertinent labs: Creatinine 2 0  Recently started on antibiotics for UTI as an outpatient  Trop 7,0   WBC midly elevated  CXR: Unremarkable  /84  Discharged to follow-p with cardiology as outpatient    ED Adm 6/3  CC chest pain  EKG NSR, first-degree AV block  /83  WBC improved  Hs trop "5,6  cr 2 26  given IV flds  Advised outpatient cardiology follow-up    6/5/23  ER follow-up  She is accompanied by her   (PMH: HTN  HL   CKD 4  DM 2  COPD  BARBARA )  ROS: In the last 5 days that she had 2 episodes of palpitations that awakened her from her sleep, prompting ED visits as above  She notes she is sedentary, given chronic knee and leg problems  She is currently taking a drug holiday off her statin therapy to see if this will improve her symptoms  She is currently off her Inspire therapy for her sleep apnea, given a mechanical fall with resultant facial injuries, about 3 months ago  Her facial injuries have improved and she is considering restarting the Inspire, which I encouraged her to do  She does note rintermittent chest pressure  She is not certain whether this occurs with exertion  She is not certain whether this  occurs concurrently with her palpitations  We reviewed her most recent hospitalizations, and labs  I recommend she hydrate within her allotted amount up to 64 ounces, she is drinking less than this  /74  EKG: Sinus rhythm 73 bpm, first-degree AV block with frequent unifocal PVCs  Exam otherwise unremarkable  Today, I recommend she increase potassium in her diet  Her last potassium was 3 7, prior 3 8 and she is on a daily diuretic  I encouraged her to hydrate  I recommend she restart [place]  We will obtain a pharmacological nuclear stress test as she has multiple CV risk factors: Family history, diabetes, hypertension, uncontrolled mixed dyslipidemia  She walks with a cane  I will increase her metoprolol to 50 mg every 12  We will obtain a 1 week Zio patch on the increased dose  We can assess the burden of PVCs, and assess for other arrhythmias such as A-fib given her history of sleep apnea  She will follow with a cardiologist in a short interval   Of note, she tells me she is a \"DNR\"    If her stress test is abnormal she will need to have a conversation with " "her cardiologist about next steps        I have spent 40 minutes with Patient and family today in which greater than 50% of this time was spent in counseling/coordination of care regarding Diagnostic results, Risks and benefits of tx options, Instructions for management, Patient and family education, Importance of tx compliance, Risk factor reductions, Impressions, Documenting in the medical record, Reviewing / ordering tests, medicine, procedures   and Obtaining or reviewing history    Assessment  Diagnoses and all orders for this visit:    Essential hypertension  -     POCT ECG  -     NM myocardial perfusion spect (rx stress and/or rest); Future  -     metoprolol tartrate (LOPRESSOR) 50 mg tablet; Take 1 tablet (50 mg total) by mouth every 12 (twelve) hours    PVC's (premature ventricular contractions)  -     AMB extended holter monitor; Future  -     NM myocardial perfusion spect (rx stress and/or rest); Future    Chest pain, unspecified type  -     AMB extended holter monitor; Future  -     NM myocardial perfusion spect (rx stress and/or rest); Future    Type 2 diabetes mellitus with chronic kidney disease, with long-term current use of insulin, unspecified CKD stage ([facility] )  -     NM myocardial perfusion spect (rx stress and/or rest);  Future          Past Medical History:   Diagnosis Date   • Allergic    • Allergic rhinitis    • Anesthesia     pt reports \"had to use double lumen endo tube for hiatal hernia repair/so surgeon could get to where he needed to work\"   • Arthritis    • Aspiration into airway    • [place] esophagus 1993    Lot of stress with children   • Basal cell carcinoma 2007    left cheek    • BCC (basal cell carcinoma) 05/27/2021    Left Nasal tip   • Cancer (HCC)     squamous cell cancer on forhead   • Cancer ([facility] )     basal cell on nose    • Cholelithiasis    • Chronic kidney disease 2000, 2018    Stones, kidney disease stage 4   • Chronic pain disorder     bilat feet and joint pain on occas " • Colon polyp    • Constipation    • COPD (chronic obstructive pulmonary disease) ([place] [address] )    • COVID-19 08/2021    recovered at home/did receive monoclonal infusion   • Dental bridge present    • Depression    • Diabetes mellitus ([place] [address] )     Type 2   • Diabetic neuropathy ([place] [address] )    • Disease of thyroid gland    • Family history of thyroid problem    • Fatty liver    • GERD (gastroesophageal reflux disease)    • Heart burn    • Hiatal hernia    • History of pneumonia    • Hyperlipidemia    • Hyperplasia, parathyroid ([place] [address] )    • Hypertension    • Hypotension 4/13/2022   • Kidney problem    • Kidney stone    • Memory loss Julu2 2020   • Motion sickness    • Motion sickness    • Neck pain    • Obesity 1978   • Obesity (BMI 30 0-34  9)    • Pollen allergies    • RLS (restless legs syndrome)    • SCC (squamous cell carcinoma) 05/04/2021    left mid forehead   • Seasonal allergies    • Sleep apnea     has inspire   • Squamous cell skin cancer 2007    left cheek    • Swollen ankles    • 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, left     great toe fusion   • Urinary incontinence    • Urinary tract infection 03/28/2022   • Wears glasses        Review of Systems   Constitutional: Negative for chills  Cardiovascular: Negative for chest pain, claudication, cyanosis, dyspnea on exertion, irregular heartbeat, leg swelling, near-syncope, orthopnea, palpitations, paroxysmal nocturnal dyspnea and syncope  Respiratory: Negative for cough and shortness of breath  Gastrointestinal: Negative for heartburn and nausea  Neurological: Negative for dizziness, focal weakness, headaches, light-headedness and weakness  All other systems reviewed and are negative  Allergies   Allergen Reactions   • Sulfamethoxazole-Trimethoprim Other (See Comments)     Tongue swelling   • Ciprofloxacin Hives and Itching           Current Outpatient Medications:   •  acetaminophen (TYLENOL) 325 mg tablet, Take 3 tablets (975 mg total) by mouth every 8 (eight) hours, Disp: , Rfl: 0  •  albuterol (Ventolin HFA) 90 mcg/act inhaler, Inhale 2 puffs every 6 (six) hours as needed for wheezing, Disp: 54 g, Rfl: 0  •  amLODIPine (NORVASC) 5 mg tablet, Take 1 tablet (5 mg total) by mouth daily, Disp: 90 tablet, Rfl: 1  •  B-D ULTRAFINE III SHORT PEN 31G X 8 MM MISC, , Disp: , Rfl: 3  •  Calcium Citrate 1040 MG TABS, Take 500 mg by mouth Three times a day, Disp: , Rfl:   •  Coenzyme Q10 (CoQ10) 100 MG CAPS, Take 100 mg by mouth in the morning, Disp: , Rfl:   •  Cranberry 200 MG CAPS, Take 200 capsules by mouth 3 (three) times a day, Disp: , Rfl:   •  DULoxetine (CYMBALTA) 30 mg delayed release capsule, Take 60 mg by mouth daily, Disp: , Rfl:   •  ergocalciferol (ERGOCALCIFEROL) 1 25 MG (02866 UT) capsule, Take 1 capsule (50,000 Units total) by mouth once a week Weekly x 8 weeks then Vit D 2000 iu daily OTC thereafter, Disp: 8 capsule, Rfl: 0  •  estradiol (ESTRACE VAGINAL) 0 1 mg/g vaginal cream, Apply pea sized amount around urethra and inside vaginal opening 3 times weekly, Disp: 42 5 g, Rfl: 2  •  gabapentin (Neurontin) 300 mg capsule, Take 1 capsule (300 mg total) by mouth daily at bedtime, Disp: 90 capsule, Rfl: 0  •  Icosapent Ethyl 1 g CAPS, Take 1 capsule by mouth 2 (two) times a day, Disp: , Rfl:   •  insulin aspart (NovoLOG) 100 units/mL injection, Inject under the skin 3 (three) times a day before meals 16 units, 16 units, 25 units  , Disp: , Rfl:   •  insulin detemir (Levemir FlexTouch) 100 Units/mL injection pen, Inject 50 Units under the skin every evening , Disp: , Rfl:   •  levothyroxine 112 mcg tablet, Take 112 mcg by mouth every morning, Disp: , Rfl:   •  metoprolol tartrate (LOPRESSOR) 50 mg tablet, Take 1 tablet (50 mg total) by mouth every 12 (twelve) hours, Disp: 180 tablet, Rfl: 3  •  olmesartan (BENICAR) 40 mg tablet, Take 1 tablet (40 mg total) by mouth daily, Disp: 30 tablet, Rfl: 1  •  pantoprazole (PROTONIX) 40 mg tablet, TAKE 1 TABLET BY MOUTH TWICE A DAY, Disp: 180 tablet, Rfl: 2  •  pramipexole (MIRAPEX) 0 25 mg tablet, Take 1 tablet (0 25 mg total) by mouth daily at bedtime, Disp: 90 tablet, Rfl: 2  •  Probiotic Product (PROBIOTIC PO), Take 1 capsule by mouth 2 (two) times a day, Disp: , Rfl:   •  torsemide (DEMADEX) 10 mg tablet, Take 0 5 tablets (5 mg total) by mouth daily, Disp: 30 tablet, Rfl: 1  •  b complex vitamins capsule, Take 1 capsule by mouth daily (Patient not taking: Reported on 2023), Disp: , Rfl:   •  rosuvastatin (CRESTOR) 5 mg tablet, Take 1 tablet (5 mg total) by mouth daily (Patient not taking: Reported on 2023), Disp: 90 tablet, Rfl: 3  •  Vitamin D, Ergocalciferol, 2000 units CAPS, Take 2,000 Units by mouth daily  (Patient not taking: Reported on 2023), Disp: , Rfl:         Social History     Socioeconomic History   • Marital status: /Civil Union     Spouse name: Not on file   • Number of children: Not on file   • Years of education: Not on file   • Highest education level: Not on file   Occupational History   • Occupation: RETIRED   Tobacco Use   • Smoking status: Former     Packs/day: 2 00     Years: 10 00     Total pack years: 20 00     Types: Cigarettes     Start date: 1968     Quit date: 1976     Years since quittin 4     Passive exposure: Past   • Smokeless tobacco: Never   • Tobacco comments:     Smoked 2 pack a day   Vaping Use   • Vaping Use: Never used   Substance and Sexual Activity   • Alcohol use: Yes     Comment: 1 or 2 a year   • Drug use: No   • Sexual activity: Not Currently     Partners: Male     Birth control/protection: Female Sterilization     Comment: defer   Other Topics Concern   • Not on file   Social History Narrative   • Not on file     Social Determinants of Health     Financial Resource Strain: Low Risk  (2023)    Overall Financial Resource Strain (CARDIA)    • Difficulty of Paying Living Expenses: Not hard at all   Food Insecurity: No Food Insecurity (3/27/2023)    Hunger Vital Sign    • Worried About Running Out of Food in the Last Year: Never true    • Ran Out of Food in the Last Year: Never true   Transportation Needs: No Transportation Needs (5/24/2023)    PRAPARE - Transportation    • Lack of Transportation (Medical): No    • Lack of Transportation (Non-Medical): No   Physical Activity: Not on file   Stress: Not on file   Social Connections: Not on file   Intimate Partner Violence: Not on file   Housing Stability: Unknown (3/27/2023)    Housing Stability Vital Sign    • Unable to Pay for Housing in the Last Year: No    • Number of Places Lived in the Last Year: Not on file    • Unstable Housing in the Last Year: No       Family History   Problem Relation Age of Onset   • Heart disease Mother    • Depression Mother    • Hypertension Mother    • COPD Mother    • Hearing loss Mother    • Anxiety disorder Mother    • Heart disease Father    • Lung cancer Father 79        Smoker    • Cancer Father         brain   • Alcohol abuse Father    • Dementia Father    • Hypertension Father    • Thyroid disease Father    • COPD Father    • Arthritis Father    • Brain cancer Father 76   • Hypertension Sister    • Diabetes Sister    • Heart disease Sister    • Thyroid disease Sister    • Cancer Sister         Lympoma, lung   • Lung cancer Sister 78   • Anxiety disorder Sister    • Hypertension Brother    • Diabetes Brother    • Cancer Brother         Throat   • Dementia Brother    • Stroke Brother    • Hypertension Brother    • Heart disease Brother    • Diabetes Brother    • Hypertension Brother    • No Known Problems Son    • No Known Problems Son    • Brain cancer Paternal Aunt         unknown age   • Diabetes Sister    • Hypertension Sister    • Diabetes Brother    • Breast cancer Neg Hx        Physical Exam  Vitals and nursing note reviewed  Constitutional:       General: She is not in acute distress  Appearance: She is not diaphoretic  HENT:      Head: Normocephalic and atraumatic  "  Eyes:      Conjunctiva/sclera: Conjunctivae normal    Cardiovascular:      Rate and Rhythm: Normal rate and regular rhythm  Pulses: Intact distal pulses  Heart sounds: Normal heart sounds  Pulmonary:      Effort: Pulmonary effort is normal       Breath sounds: Normal breath sounds  Abdominal:      General: Bowel sounds are normal       Palpations: Abdomen is soft  Musculoskeletal:         General: Normal range of motion  Cervical back: Normal range of motion and neck supple  Skin:     General: Skin is warm and dry  Neurological:      Mental Status: She is alert and oriented to person, place, and time  Vitals: Blood pressure 134/74, pulse 78, resp  rate 18, height 5' 4\" (1 626 m), weight 78 5 kg (173 lb 2 oz), SpO2 96 %  Wt Readings from Last 3 Encounters:   06/06/23 78 5 kg (173 lb 2 oz)   05/29/23 80 3 kg (177 lb 0 5 oz)   05/24/23 79 1 kg (174 lb 6 4 oz)         Labs & Results:  Lab Results   Component Value Date    HCT 35 4 06/03/2023    HGB 11 2 (L) 06/03/2023    MCV 91 06/03/2023     06/03/2023    WBC 9 77 06/03/2023     BNP   Date Value Ref Range Status   03/24/2023 35 0 - 100 pg/mL Final     No components found for: \"CHEM\"  Total CK   Date Value Ref Range Status   05/23/2023 27 26 - 192 U/L Final   09/19/2022 49 26 - 192 U/L Final   05/19/2022 50 26 - 192 U/L Final     Troponin I   Date Value Ref Range Status   07/14/2020 <0 02 <=0 04 ng/mL Final     Comment:       Siemens Chemistry analyzer 99% cutoff is > 0 04 ng/mL in network labs     o cTnI 99% cutoff is useful only when applied to patients in the clinical setting of myocardial ischemia   o cTnI 99% cutoff should be interpreted in the context of clinical history, ECG findings and possibly cardiac imaging to establish correct diagnosis     o cTnI 99% cutoff may be suggestive but clearly not indicative of a coronary event without the clinical setting of myocardial ischemia      06/19/2020 <0 02 <=0 04 ng/mL Final " Comment:     Autovalidation override  Siemens Chemistry analyzer 99% cutoff is > 0 04 ng/mL in network labs     o cTnI 99% cutoff is useful only when applied to patients in the clinical setting of myocardial ischemia   o cTnI 99% cutoff should be interpreted in the context of clinical history, ECG findings and possibly cardiac imaging to establish correct diagnosis  o cTnI 99% cutoff may be suggestive but clearly not indicative of a coronary event without the clinical setting of myocardial ischemia  2020 <0 02 <=0 04 ng/mL Final     Comment:     Autovalidation override  Siemens Chemistry analyzer 99% cutoff is > 0 04 ng/mL in network labs     o cTnI 99% cutoff is useful only when applied to patients in the clinical setting of myocardial ischemia   o cTnI 99% cutoff should be interpreted in the context of clinical history, ECG findings and possibly cardiac imaging to establish correct diagnosis  o cTnI 99% cutoff may be suggestive but clearly not indicative of a coronary event without the clinical setting of myocardial ischemia         Results for orders placed during the hospital encounter of 20    Echo complete with contrast if indicated    Narrative  [address]  [phone]    Transthoracic Echocardiogram  2D, M-mode, Doppler, and Color Doppler    Study date:  03-May-2020    Patient: Enzo Levy  MR number: [id number]  Account number: [de-identified]  : 1950  Age: 69 years  Gender: Female  Status: Inpatient  Location: Bedside  Height: 62 in  Weight: 190 lb  BP: 149/ 70 mmHg    Indications: Pulmonary Hypertension    Diagnoses: I27 0 - Primary pulmonary hypertension    Sonographer:  NUBIA Hein  Referring Physician:  Anjum Peraza PA-C  Group:  [facility]  Interpreting Physician:  Ej Kelley MD    IMPRESSIONS:  There is no clear evidence of significant pulmonary vascular disease by noninvasive assessment  PASP<40 mmHg  Notably, there is mild IVS septal bounce which may be related to HFpEF  SUMMARY    LEFT VENTRICLE:  Systolic function was normal  Ejection fraction was estimated in the range of 55 %  Although no diagnostic regional wall motion abnormality was identified, this possibility cannot be completely excluded on the basis of this study  LEFT ATRIUM:  The atrium was dilated  MITRAL VALVE:  There was trace regurgitation  HISTORY: PRIOR HISTORY: ANDRA,BARBARA,CKD,DM,Pulmonary HTN    PROCEDURE: The procedure was performed at the bedside  This was a routine study  The transthoracic approach was used  The study included complete 2D imaging, M-mode, complete spectral Doppler, and color Doppler  The heart rate was 62 bpm,  at the start of the study  Echocardiographic views were limited due to lung interference  This was a technically difficult study  LEFT VENTRICLE: Size was normal  Systolic function was normal  Ejection fraction was estimated in the range of 55 %  Although no diagnostic regional wall motion abnormality was identified, this possibility cannot be completely excluded on  the basis of this study  Wall thickness was normal     RIGHT VENTRICLE: The size was normal  Systolic function was normal     LEFT ATRIUM: The atrium was dilated  RIGHT ATRIUM: Size was normal     MITRAL VALVE: DOPPLER: There was no evidence for stenosis  There was trace regurgitation  AORTIC VALVE: DOPPLER: There was no evidence for stenosis  There was no regurgitation  TRICUSPID VALVE: DOPPLER: There was no evidence for stenosis  There was no significant regurgitation  PULMONIC VALVE: DOPPLER: There was no evidence for stenosis  There was no significant regurgitation  PERICARDIUM: There was no pericardial effusion  AORTA: The root exhibited normal size      SYSTEM MEASUREMENT TABLES    2D  %FS: 28 1 %  Ao Diam: 2 75 cm  EDV(Teich): 91 53 ml  EF(Teich): 54 51 %  ESV(Teich): 41 64 ml  IVSd: 1 05 cm  LA Area: 21 25 cm2  LA Diam: 3 49 cm  LVEDV MOD A4C: 98 62 ml  LVEF MOD A4C: 52 55 %  LVESV MOD A4C: 46 8 ml  LVIDd: 4 48 cm  LVIDs: 3 22 cm  LVLd A4C: 7 72 cm  LVLs A4C: 6 58 cm  LVPWd: 1 13 cm  RA Area: 11 84 cm2  RVIDd: 3 24 cm  SV MOD A4C: 51 82 ml  SV(Teich): 49 89 ml    CW  TR MaxP 75 mmHg  TR Vmax: 2 95 m/s    MM  TAPSE: 1 44 cm    PW  E': 0 08 m/s  E/E': 10 52  MV A Genaro: 0 69 m/s  MV Dec Wagoner: 4 34 m/s2  MV DecT: 193 96 ms  MV E Genaro: 0 84 m/s  MV E/A Ratio: 1 23  MV PHT: 56 25 ms  MVA By PHT: 3 91 cm2    [facility] [place] [facility] Accredited Echocardi[facility]    Prepared and electronically signed by    Geovany Deal MD  Signed [id number] 13:36:59    No results found for this or any previous visit  This note was completed in part utilizing [employer] direct voice recognition software  Grammatical errors, random word insertion, spelling mistakes, and incomplete sentences may be an occasional consequence of the system secondary to software limitations, ambient noise and hardware issues  At the time of dictation, efforts were made to edit, clarify and /or correct errors  Please read the chart carefully and recognize, using context, where substitutions have occurred    If you have any questions or concerns about the context, text or information contained within the body of this dictation, please contact myself, the provider, for further clarification

## 2023-06-06 ENCOUNTER — OFFICE VISIT (OUTPATIENT)
Dept: CARDIOLOGY CLINIC | Facility: CLINIC | Age: 73
End: 2023-06-06
Payer: MEDICARE

## 2023-06-06 ENCOUNTER — TELEPHONE (OUTPATIENT)
Dept: CARDIOLOGY CLINIC | Facility: CLINIC | Age: 73
End: 2023-06-06

## 2023-06-06 VITALS
HEART RATE: 78 BPM | BODY MASS INDEX: 29.56 KG/M2 | OXYGEN SATURATION: 96 % | WEIGHT: 173.13 LBS | RESPIRATION RATE: 18 BRPM | DIASTOLIC BLOOD PRESSURE: 74 MMHG | HEIGHT: 64 IN | SYSTOLIC BLOOD PRESSURE: 134 MMHG

## 2023-06-06 DIAGNOSIS — I49.3 PVC'S (PREMATURE VENTRICULAR CONTRACTIONS): ICD-10-CM

## 2023-06-06 DIAGNOSIS — I10 ESSENTIAL HYPERTENSION: Primary | ICD-10-CM

## 2023-06-06 DIAGNOSIS — Z79.4 TYPE 2 DIABETES MELLITUS WITH CHRONIC KIDNEY DISEASE, WITH LONG-TERM CURRENT USE OF INSULIN, UNSPECIFIED CKD STAGE (HCC): ICD-10-CM

## 2023-06-06 DIAGNOSIS — E11.22 TYPE 2 DIABETES MELLITUS WITH CHRONIC KIDNEY DISEASE, WITH LONG-TERM CURRENT USE OF INSULIN, UNSPECIFIED CKD STAGE (HCC): ICD-10-CM

## 2023-06-06 DIAGNOSIS — R07.9 CHEST PAIN, UNSPECIFIED TYPE: ICD-10-CM

## 2023-06-06 PROCEDURE — 93000 ELECTROCARDIOGRAM COMPLETE: CPT | Performed by: NURSE PRACTITIONER

## 2023-06-06 PROCEDURE — 99215 OFFICE O/P EST HI 40 MIN: CPT | Performed by: NURSE PRACTITIONER

## 2023-06-06 RX ORDER — METOPROLOL TARTRATE 50 MG/1
50 TABLET, FILM COATED ORAL EVERY 12 HOURS SCHEDULED
Qty: 180 TABLET | Refills: 3
Start: 2023-06-06

## 2023-06-06 NOTE — LETTER
June 6, 2023     Norma Villa MD  [facility]    Patient: Mateo See   YOB: 1950   Date of Visit: 6/6/2023       Dear Dr Jammie Mo:    Thank you for referring Julieta Hamilton to me for evaluation  Below are my notes for this consultation  If you have questions, please do not hesitate to call me  I look forward to following your patient along with you  Sincerely,        TC Leo        CC: MD Rojas Nair, [facility]  6/6/2023  9:06 AM  Sign when Signing Visit  Cardiology  Acute  Office Visit Note  Mateo See   75 y o    female   MRN: [id number]  [address]  [address] [address] [phone]    PCP: Norma Villa MD  Cardiologist: Dr Rk Boyer            Summary of recommendations  -Heart healthy diet  Educational information provided  -Increase metoprolol to 50 mg q12  -pharmacological nuclear stress test   If abnormal, she would like a conversation with her cardiologist about what to do next- currently she is a DNR  -Needs more aggressive risk factor modification particularly her lipids and elevated triglycerides  Currently her statin therapy is on hold given some muscle aches  She is taking a holiday to see if it would improve  -Encouraged hydration within her fluid allotment, 64 ounces a day  -Resume treatment with Inspire for BARBARA  -Follow up will be scheduled with Dr Eugenia Jones screening: 10/11/2021       Assessment/plan  Chest pressure, intermittent  Cannot recall if she feels it concurrently with the palpitations or not  Palpitations  On metoprolol tartrate 25 mg every 12  EKG: NSr 73  Frequent PVCs  Nuclear stress test  I recommend she increase potassium containing foods, and her diet  Hypertension, essential   BP  134/74  on amlodipine 5 mg daily, metoprolol tartrate 25 mg every 12, olmesartan 40 mg daily, loop diuretic    Increase BB given PVCs and palpitations  Hyperlipidemia mixed, on rosuvastatin 5 mg daily, icosapent ethyl 2 g twice daily  5/23/2023: Triglycerides 641  Direct LDL 87  Both rosuvastatin and atorvastatin are indicated for hypertriglyceridemia  She is currently on a drug holiday to see if being off rosuvastatin will help her muscle aches  If not, I recommend uptitration  She can try co-Q10 with it, at a dose of 100 mg every 12  Latest Reference Range & Units 09/19/22 08:24 05/23/23 10:26   Cholesterol See Comment mg/dL 103 279 (H)   Triglycerides See Comment mg/dL 226 (H) 641 (H)   HDL >=50 mg/dL 37 (L) 50   LDL Calculated 0 - 100 mg/dL 21 See Comment   LDL CHOLESTEROL DIRECT 0 - 100 mg/dl  87   Type 2 diabetes mellitus on insulin  3/7/23: Hemoglobin A1C 6 8  Obesity, BMI 30  BARBARA  COPD  CKD 4  baseline Cr around 1 7  More recently in the 2s  Encouraged to drink within her allotment- 64 oz a day  Hypothyroidism on replacement  History tobacco, in remission  Prior 10-pack-year, 2 packs/day, quitting in 1976  Cardiac testing  TTE 5/2020  EF 55%  No RWMA although this possibly could not be excluded on the basis of the study  LAE  Normal RV size and function  There is no clear evidence of significant pulmonary vascular disease by noninvasive assessment  PASP<40 mmHg  Notably, there is mild IVS septal bounce which may be related to HFpEF  NMR myocardial SPECT September 2021Normal study after pharmacologic vasodilation without reproduction of symptoms  Myocardial perfusion imaging was normal at rest and with stress  Left ventricular systolic function was normal                       HPI  Angela Pena is a 74 yo female with hypertension, dyslipidemia, diabetes, BARBARA, CKD 4, COPD, hypothyroidism on replacement  She is obese, BMI 30     Given a history of chest pain, she underwent a stress test in September of 2021 that showed no evidence of ischemia  Given CKD 4, her nephrologist has been managing her hypertension   She follows with   Pat Diamond  She was last seen in the office October 2022      ED Adm 5/29  CC: chest pain  Woke up with palpitations at 3 AM, chest discomfort  Woke up at 6 AM symptoms  EKG normal sinus rhythm  PVCs  Nonspecific T wave changes  Pertinent labs: Creatinine 2 0  Recently started on antibiotics for UTI as an outpatient  Trop 7,0  WBC midly elevated  CXR: Unremarkable  /84  Discharged to follow-p with cardiology as outpatient    ED Adm 6/3  CC chest pain  EKG NSR, first-degree AV block  /83  WBC improved  Hs trop 5,6  cr 2 26  given IV flds  Advised outpatient cardiology follow-up    6/5/23  ER follow-up  She is accompanied by her   (PMH: HTN  HL   CKD 4  DM 2  COPD  BARBARA )  ROS: In the last 5 days that she had 2 episodes of palpitations that awakened her from her sleep, prompting ED visits as above  She notes she is sedentary, given chronic knee and leg problems  She is currently taking a drug holiday off her statin therapy to see if this will improve her symptoms  She is currently off her Inspire therapy for her sleep apnea, given a mechanical fall with resultant facial injuries, about 3 months ago  Her facial injuries have improved and she is considering restarting the Inspire, which I encouraged her to do  She does note rintermittent chest pressure  She is not certain whether this occurs with exertion  She is not certain whether this  occurs concurrently with her palpitations  We reviewed her most recent hospitalizations, and labs  I recommend she hydrate within her allotted amount up to 64 ounces, she is drinking less than this  /74  EKG: Sinus rhythm 73 bpm, first-degree AV block with frequent unifocal PVCs  Exam otherwise unremarkable  Today, I recommend she increase potassium in her diet    Her last potassium was 3 7, prior 3 8 and she is on a daily diuretic  I encouraged her to hydrate  I recommend she restart [facility]  We will obtain a pharmacological nuclear stress test "as she has multiple CV risk factors: Family history, diabetes, hypertension, uncontrolled mixed dyslipidemia  She walks with a cane  I will increase her metoprolol to 50 mg every 12  We will obtain a 1 week Zio patch on the increased dose  We can assess the burden of PVCs, and assess for other arrhythmias such as A-fib given her history of sleep apnea  She will follow with a cardiologist in a short interval   Of note, she tells me she is a \"DNR\"  If her stress test is abnormal she will need to have a conversation with her cardiologist about next steps        I have spent 40 minutes with Patient and family today in which greater than 50% of this time was spent in counseling/coordination of care regarding Diagnostic results, Risks and benefits of tx options, Instructions for management, Patient and family education, Importance of tx compliance, Risk factor reductions, Impressions, Documenting in the medical record, Reviewing / ordering tests, medicine, procedures   and Obtaining or reviewing history    Assessment  Diagnoses and all orders for this visit:    Essential hypertension  -     POCT ECG  -     NM myocardial perfusion spect (rx stress and/or rest); Future  -     metoprolol tartrate (LOPRESSOR) 50 mg tablet; Take 1 tablet (50 mg total) by mouth every 12 (twelve) hours    PVC's (premature ventricular contractions)  -     AMB extended holter monitor; Future  -     NM myocardial perfusion spect (rx stress and/or rest); Future    Chest pain, unspecified type  -     AMB extended holter monitor; Future  -     NM myocardial perfusion spect (rx stress and/or rest); Future    Type 2 diabetes mellitus with chronic kidney disease, with long-term current use of insulin, unspecified CKD stage ([facility] [address] )  -     NM myocardial perfusion spect (rx stress and/or rest);  Future          Past Medical History:   Diagnosis Date   • Allergic    • Allergic rhinitis    • Anesthesia     pt reports \"had to use double lumen endo tube for " "hiatal hernia repair/so surgeon could get to where he needed to work\"   • Arthritis    • Aspiration into airway    • [place]ada esophagus 1993    Lot of stress with children   • Basal cell carcinoma 2007    left cheek    • BCC (basal cell carcinoma) 05/27/2021    Left Nasal tip   • Cancer (HCC)     squamous cell cancer on forhead   • Cancer ([facility] [address] )     basal cell on nose    • Cholelithiasis    • Chronic kidney disease 2000, 2018    Stones, kidney disease stage 4   • Chronic pain disorder     bilat feet and joint pain on occas   • Colon polyp    • Constipation    • COPD (chronic obstructive pulmonary disease) ([facility] [address] )    • COVID-19 08/2021    recovered at home/did receive monoclonal infusion   • Dental bridge present    • Depression    • Diabetes mellitus ([facility]  )     Type 2   • Diabetic neuropathy ([place] [address] )    • Disease of thyroid gland    • Family history of thyroid problem    • Fatty liver    • GERD (gastroesophageal reflux disease)    • Heart burn    • Hiatal hernia    • History of pneumonia    • Hyperlipidemia    • Hyperplasia, parathyroid ([facility]  )    • Hypertension    • Hypotension 4/13/2022   • Kidney problem    • Kidney stone    • Memory loss Julu2 2020   • Motion sickness    • Motion sickness    • Neck pain    • Obesity 1978   • Obesity (BMI 30 0-34  9)    • Pollen allergies    • RLS (restless legs syndrome)    • SCC (squamous cell carcinoma) 05/04/2021    left mid forehead   • Seasonal allergies    • Sleep apnea     has inspire   • Squamous cell skin cancer 2007    left cheek    • Swollen ankles    • 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, left     great toe fusion   • Urinary incontinence    • Urinary tract infection 03/28/2022   • Wears glasses        Review of Systems   Constitutional: Negative for chills  Cardiovascular: Negative for chest pain, claudication, cyanosis, dyspnea on exertion, irregular heartbeat, leg swelling, near-syncope, orthopnea, palpitations, paroxysmal nocturnal dyspnea and syncope     Respiratory: " Negative for cough and shortness of breath  Gastrointestinal: Negative for heartburn and nausea  Neurological: Negative for dizziness, focal weakness, headaches, light-headedness and weakness  All other systems reviewed and are negative  Allergies   Allergen Reactions   • Sulfamethoxazole-Trimethoprim Other (See Comments)     Tongue swelling   • Ciprofloxacin Hives and Itching       Current Outpatient Medications:   •  acetaminophen (TYLENOL) 325 mg tablet, Take 3 tablets (975 mg total) by mouth every 8 (eight) hours, Disp: , Rfl: 0  •  albuterol (Ventolin HFA) 90 mcg/act inhaler, Inhale 2 puffs every 6 (six) hours as needed for wheezing, Disp: 54 g, Rfl: 0  •  amLODIPine (NORVASC) 5 mg tablet, Take 1 tablet (5 mg total) by mouth daily, Disp: 90 tablet, Rfl: 1  •  B-D ULTRAFINE III SHORT PEN 31G X 8 MM MISC, , Disp: , Rfl: 3  •  Calcium Citrate 1040 MG TABS, Take 500 mg by mouth Three times a day, Disp: , Rfl:   •  Coenzyme Q10 (CoQ10) 100 MG CAPS, Take 100 mg by mouth in the morning, Disp: , Rfl:   •  Cranberry 200 MG CAPS, Take 200 capsules by mouth 3 (three) times a day, Disp: , Rfl:   •  DULoxetine (CYMBALTA) 30 mg delayed release capsule, Take 60 mg by mouth daily, Disp: , Rfl:   •  ergocalciferol (ERGOCALCIFEROL) 1 25 MG (94707 UT) capsule, Take 1 capsule (50,000 Units total) by mouth once a week Weekly x 8 weeks then Vit D 2000 iu daily OTC thereafter, Disp: 8 capsule, Rfl: 0  •  estradiol (ESTRACE VAGINAL) 0 1 mg/g vaginal cream, Apply pea sized amount around urethra and inside vaginal opening 3 times weekly, Disp: 42 5 g, Rfl: 2  •  gabapentin (Neurontin) 300 mg capsule, Take 1 capsule (300 mg total) by mouth daily at bedtime, Disp: 90 capsule, Rfl: 0  •  Icosapent Ethyl 1 g CAPS, Take 1 capsule by mouth 2 (two) times a day, Disp: , Rfl:   •  insulin aspart (NovoLOG) 100 units/mL injection, Inject under the skin 3 (three) times a day before meals 16 units, 16 units, 25 units  , Disp: , Rfl: •  insulin detemir (Levemir FlexTouch) 100 Units/mL injection pen, Inject 50 Units under the skin every evening , Disp: , Rfl:   •  levothyroxine 112 mcg tablet, Take 112 mcg by mouth every morning, Disp: , Rfl:   •  metoprolol tartrate (LOPRESSOR) 50 mg tablet, Take 1 tablet (50 mg total) by mouth every 12 (twelve) hours, Disp: 180 tablet, Rfl: 3  •  olmesartan (BENICAR) 40 mg tablet, Take 1 tablet (40 mg total) by mouth daily, Disp: 30 tablet, Rfl: 1  •  pantoprazole (PROTONIX) 40 mg tablet, TAKE 1 TABLET BY MOUTH TWICE A DAY, Disp: 180 tablet, Rfl: 2  •  pramipexole (MIRAPEX) 0 25 mg tablet, Take 1 tablet (0 25 mg total) by mouth daily at bedtime, Disp: 90 tablet, Rfl: 2  •  Probiotic Product (PROBIOTIC PO), Take 1 capsule by mouth 2 (two) times a day, Disp: , Rfl:   •  torsemide (DEMADEX) 10 mg tablet, Take 0 5 tablets (5 mg total) by mouth daily, Disp: 30 tablet, Rfl: 1  •  b complex vitamins capsule, Take 1 capsule by mouth daily (Patient not taking: Reported on 2023), Disp: , Rfl:   •  rosuvastatin (CRESTOR) 5 mg tablet, Take 1 tablet (5 mg total) by mouth daily (Patient not taking: Reported on 2023), Disp: 90 tablet, Rfl: 3  •  Vitamin D, Ergocalciferol, 2000 units CAPS, Take 2,000 Units by mouth daily  (Patient not taking: Reported on 2023), Disp: , Rfl:         Social History     Socioeconomic History   • Marital status: /Civil Union     Spouse name: Not on file   • Number of children: Not on file   • Years of education: Not on file   • Highest education level: Not on file   Occupational History   • Occupation: RETIRED   Tobacco Use   • Smoking status: Former     Packs/day: 2 00     Years: 10 00     Total pack years: 20 00     Types: Cigarettes     Start date: 1968     Quit date: 1976     Years since quittin 4     Passive exposure: Past   • Smokeless tobacco: Never   • Tobacco comments:     Smoked 2 pack a day   Vaping Use   • Vaping Use: Never used   Substance and Sexual Activity   • Alcohol use: Yes     Comment: 1 or 2 a year   • Drug use: No   • Sexual activity: Not Currently     Partners: Male     Birth control/protection: Female Sterilization     Comment: defer   Other Topics Concern   • Not on file   Social History Narrative   • Not on file     Social Determinants of Health     Financial Resource Strain: Low Risk  (5/24/2023)    Overall Financial Resource Strain (CARDIA)    • Difficulty of Paying Living Expenses: Not hard at all   Food Insecurity: No Food Insecurity (3/27/2023)    Hunger Vital Sign    • Worried About Running Out of Food in the Last Year: Never true    • Ran Out of Food in the Last Year: Never true   Transportation Needs: No Transportation Needs (5/24/2023)    PRAPARE - Transportation    • Lack of Transportation (Medical): No    • Lack of Transportation (Non-Medical):  No   Physical Activity: Not on file   Stress: Not on file   Social Connections: Not on file   Intimate Partner Violence: Not on file   Housing Stability: Unknown (3/27/2023)    Housing Stability Vital Sign    • Unable to Pay for Housing in the Last Year: No    • Number of Places Lived in the Last Year: Not on file    • Unstable Housing in the Last Year: No       Family History   Problem Relation Age of Onset   • Heart disease Mother    • Depression Mother    • Hypertension Mother    • COPD Mother    • Hearing loss Mother    • Anxiety disorder Mother    • Heart disease Father    • Lung cancer Father 79        Smoker    • Cancer Father         brain   • Alcohol abuse Father    • Dementia Father    • Hypertension Father    • Thyroid disease Father    • COPD Father    • Arthritis Father    • Brain cancer Father 76   • Hypertension Sister    • Diabetes Sister    • Heart disease Sister    • Thyroid disease Sister    • Cancer Sister         Lympoma, lung   • Lung cancer Sister 78   • Anxiety disorder Sister    • Hypertension Brother    • Diabetes Brother    • Cancer Brother         Throat   • Dementia "Brother    • Stroke Brother    • Hypertension Brother    • Heart disease Brother    • Diabetes Brother    • Hypertension Brother    • No Known Problems Son    • No Known Problems Son    • Brain cancer Paternal Aunt         unknown age   • Diabetes Sister    • Hypertension Sister    • Diabetes Brother    • Breast cancer Neg Hx        Physical Exam  Vitals and nursing note reviewed  Constitutional:       General: She is not in acute distress  Appearance: She is not diaphoretic  HENT:      Head: Normocephalic and atraumatic  Eyes:      Conjunctiva/sclera: Conjunctivae normal    Cardiovascular:      Rate and Rhythm: Normal rate and regular rhythm  Pulses: Intact distal pulses  Heart sounds: Normal heart sounds  Pulmonary:      Effort: Pulmonary effort is normal       Breath sounds: Normal breath sounds  Abdominal:      General: Bowel sounds are normal       Palpations: Abdomen is soft  Musculoskeletal:         General: Normal range of motion  Cervical back: Normal range of motion and neck supple  Skin:     General: Skin is warm and dry  Neurological:      Mental Status: She is alert and oriented to person, place, and time  Vitals: Blood pressure 134/74, pulse 78, resp  rate 18, height 5' 4\" (1 626 m), weight 78 5 kg (173 lb 2 oz), SpO2 96 %     Wt Readings from Last 3 Encounters:   06/06/23 78 5 kg (173 lb 2 oz)   05/29/23 80 3 kg (177 lb 0 5 oz)   05/24/23 79 1 kg (174 lb 6 4 oz)         Labs & Results:  Lab Results   Component Value Date    HCT 35 4 06/03/2023    HGB 11 2 (L) 06/03/2023    MCV 91 06/03/2023     06/03/2023    WBC 9 77 06/03/2023     BNP   Date Value Ref Range Status   03/24/2023 35 0 - 100 pg/mL Final     No components found for: \"CHEM\"  Total CK   Date Value Ref Range Status   05/23/2023 27 26 - 192 U/L Final   09/19/2022 49 26 - 192 U/L Final   05/19/2022 50 26 - 192 U/L Final     Troponin I   Date Value Ref Range Status   07/14/2020 <0 02 <=0 04 ng/mL " Final     Comment:       Siemens Chemistry analyzer 99% cutoff is > 0 04 ng/mL in network labs     o cTnI 99% cutoff is useful only when applied to patients in the clinical setting of myocardial ischemia   o cTnI 99% cutoff should be interpreted in the context of clinical history, ECG findings and possibly cardiac imaging to establish correct diagnosis  o cTnI 99% cutoff may be suggestive but clearly not indicative of a coronary event without the clinical setting of myocardial ischemia      06/19/2020 <0 02 <=0 04 ng/mL Final     Comment:     Autovalidation override  Siemens Chemistry analyzer 99% cutoff is > 0 04 ng/mL in network labs     o cTnI 99% cutoff is useful only when applied to patients in the clinical setting of myocardial ischemia   o cTnI 99% cutoff should be interpreted in the context of clinical history, ECG findings and possibly cardiac imaging to establish correct diagnosis  o cTnI 99% cutoff may be suggestive but clearly not indicative of a coronary event without the clinical setting of myocardial ischemia  05/02/2020 <0 02 <=0 04 ng/mL Final     Comment:     Autovalidation override  Siemens Chemistry analyzer 99% cutoff is > 0 04 ng/mL in network labs     o cTnI 99% cutoff is useful only when applied to patients in the clinical setting of myocardial ischemia   o cTnI 99% cutoff should be interpreted in the context of clinical history, ECG findings and possibly cardiac imaging to establish correct diagnosis  o cTnI 99% cutoff may be suggestive but clearly not indicative of a coronary event without the clinical setting of myocardial ischemia         Results for orders placed during the hospital encounter of 05/02/20    Echo complete with contrast if indicated    Narrative  [facility] [facility]  [phone]    Transthoracic Echocardiogram  2D, M-mode, Doppler, and Color Doppler    Study date:  03-May-2020    Patient: Tara Jacinto  MR number: [id number]  Account number: [de-identified]  : 1950  Age: 69 years  Gender: Female  Status: Inpatient  Location: Bedside  Height: 62 in  Weight: 190 lb  BP: 149/ 70 mmHg    Indications: Pulmonary Hypertension    Diagnoses: I27 0 - Primary pulmonary hypertension    Sonographer:  NUBIA Couch  Referring Physician:  Tomer Durbin PA-C  Group:  [facility]  Interpreting Physician:  Geovany Deal MD    IMPRESSIONS:  There is no clear evidence of significant pulmonary vascular disease by noninvasive assessment  PASP<40 mmHg  Notably, there is mild IVS septal bounce which may be related to HFpEF  SUMMARY    LEFT VENTRICLE:  Systolic function was normal  Ejection fraction was estimated in the range of 55 %  Although no diagnostic regional wall motion abnormality was identified, this possibility cannot be completely excluded on the basis of this study  LEFT ATRIUM:  The atrium was dilated  MITRAL VALVE:  There was trace regurgitation  HISTORY: PRIOR HISTORY: ANDRA,BARBARA,CKD,DM,Pulmonary HTN    PROCEDURE: The procedure was performed at the bedside  This was a routine study  The transthoracic approach was used  The study included complete 2D imaging, M-mode, complete spectral Doppler, and color Doppler  The heart rate was 62 bpm,  at the start of the study  Echocardiographic views were limited due to lung interference  This was a technically difficult study  LEFT VENTRICLE: Size was normal  Systolic function was normal  Ejection fraction was estimated in the range of 55 %  Although no diagnostic regional wall motion abnormality was identified, this possibility cannot be completely excluded on  the basis of this study  Wall thickness was normal     RIGHT VENTRICLE: The size was normal  Systolic function was normal     LEFT ATRIUM: The atrium was dilated  RIGHT ATRIUM: Size was normal     MITRAL VALVE: DOPPLER: There was no evidence for stenosis   There was trace regurgitation  AORTIC VALVE: DOPPLER: There was no evidence for stenosis  There was no regurgitation  TRICUSPID VALVE: DOPPLER: There was no evidence for stenosis  There was no significant regurgitation  PULMONIC VALVE: DOPPLER: There was no evidence for stenosis  There was no significant regurgitation  PERICARDIUM: There was no pericardial effusion  AORTA: The root exhibited normal size  SYSTEM MEASUREMENT TABLES    2D  %FS: 28 1 %  Ao Diam: 2 75 cm  EDV(Teich): 91 53 ml  EF(Teich): 54 51 %  ESV(Teich): 41 64 ml  IVSd: 1 05 cm  LA Area: 21 25 cm2  LA Diam: 3 49 cm  LVEDV MOD A4C: 98 62 ml  LVEF MOD A4C: 52 55 %  LVESV MOD A4C: 46 8 ml  LVIDd: 4 48 cm  LVIDs: 3 22 cm  LVLd A4C: 7 72 cm  LVLs A4C: 6 58 cm  LVPWd: 1 13 cm  RA Area: 11 84 cm2  RVIDd: 3 24 cm  SV MOD A4C: 51 82 ml  SV(Teich): 49 89 ml    CW  TR MaxP 75 mmHg  TR Vmax: 2 95 m/s    MM  TAPSE: 1 44 cm    PW  E': 0 08 m/s  E/E': 10 52  MV A Genaro: 0 69 m/s  MV Dec Mono: 4 34 m/s2  MV DecT: 193 96 ms  MV E Genaro: 0 84 m/s  MV E/A Ratio: 1 23  MV PHT: 56 25 ms  MVA By PHT: 3 91 cm2    [facility] Accredited [facility]ch[facility]    Prepared and electronically signed by    Db Winchester MD  Signed [id number] 13:36:59    No results found for this or any previous visit  This note was completed in part utilizing m-[employer] fluency direct voice recognition software  Grammatical errors, random word insertion, spelling mistakes, and incomplete sentences may be an occasional consequence of the system secondary to software limitations, ambient noise and hardware issues  At the time of dictation, efforts were made to edit, clarify and /or correct errors  Please read the chart carefully and recognize, using context, where substitutions have occurred    If you have any questions or concerns about the context, text or information contained within the body of this dictation, please contact myself, the provider, for further clarification

## 2023-06-06 NOTE — PATIENT INSTRUCTIONS
Potassium Content of Foods List   WHAT YOU NEED TO KNOW:   Potassium is a mineral that is found in most foods  Potassium helps to balance fluids and minerals in your body  It also helps your body maintain a normal blood pressure  Potassium helps your muscles contract and your nerves function normally  DISCHARGE INSTRUCTIONS:   Why you may need to change the amount of potassium you eat: You may need more potassium  if you have hypokalemia (low potassium levels) or high blood pressure  You may also need more potassium if you are taking diuretics  Diuretics and certain medicines cause your body to lose potassium  You may need less potassium  in your diet if you have hyperkalemia (high potassium levels) or kidney disease  Potassium content of fruit:  The amount of potassium in milligrams (mg) contained in each fruit or serving of fruit is listed beside the item    High-potassium foods (more than 200 mg per serving):      1 medium banana (425)    ½ of a papaya (390)    ½ cup of prune juice (370)    ¼ cup of raisins (270)    1 medium elizabeth (325) or kiwi (240)    1 small orange (240) or ½ cup of orange juice (235)    ½ cup of cubed cantaloupe (215) or diced honeydew melon (200)    1 medium pear (200)    Medium-potassium foods (50 to 200 mg per serving):      1 medium peach (185)    1 small apple or ½ cup of apple juice (150)    ½ cup of peaches canned in juice (120)    ½ cup of canned pineapple (100)    ½ cup of fresh, sliced strawberries (712)    ½ cup of watermelon (85)    Low-potassium foods (less than 50 mg per serving):      ½ cup of cranberries (45) or cranberry juice cocktail (20)    ½ cup of nectar of papaya, elizabeth, or pear (35)    Potassium content of vegetables:   High-potassium foods (more than 200 mg per serving):      1 medium baked potato, with skin (925)    1 baked medium sweet potato, with skin (450)    ½ cup of tomato or vegetable juice (275), or 1 medium raw tomato (290)    ½ cup of mushrooms (280)    ½ cup of fresh brussels sprouts (250)    ½ cup of cooked zucchini (220) or winter squash (250)    ¼ of a medium avocado (245)    ½ cup of broccoli (230)    Medium-potassium foods (50 to 200 mg per serving):      ½ cup of corn (195)    ½ cup of fresh or cooked carrots (180)    ½ cup of fresh cauliflower (150)    ½ cup of asparagus (155)    ½ cup of canned peas (90)     1 cup of lettuce, all types (100)    ½ cup of fresh green beans (90)    ½ cup of frozen green beans (85)    ½ cup of cucumber (80)    Potassium content of protein foods:   High-potassium foods (more than 200 mg per serving):      ½ cup of cooked flanagan beans (400) or lentils (365)    1 cup of soy milk (300)    3 ounces of baked or broiled salmon (319)    3 ounces of roasted turkey, dark meat (250)    ¼ cup of sunflower seeds (241)    3 ounces of cooked lean beef (224)    2 tablespoons of smooth peanut butter (210)    Medium-potassium foods (50 to 200 mg per serving):      1 ounce of salted peanuts, almonds, or cashews (200)    1 large egg (60 mg)    Potassium content of dairy foods:   High-potassium foods (more than 200 mg per serving):      6 ounces of yogurt (260 to 435)    1 cup of nonfat, low-fat, or whole milk (350 to 380)    Medium-potassium foods (50 to 200 mg per serving):      ½ cup of ricotta cheese (154)    ½ cup of vanilla ice cream (131)    ½ cup of low-fat (2%) cottage cheese (110)    Low-potassium foods (less than 50 mg per serving):      1 ounce of cheese (20 to 30)      Potassium content of grains:   1 slice of white bread (30)    ½ cup of white or brown rice (50)    ½ cup of spaghetti or macaroni (30)    1 flour or corn tortilla (50)    1 four-inch waffle (50)    Potassium content of other foods:   1 tablespoon of molasses (295)    1½ ounces of chocolate (165)    Some salt substitutes may contain a high amount of potassium  Check the food label to find the amount of potassium it contains      © Copyright [employer] 2022 Information is for End User's use only and may not be sold, redistributed or otherwise used for commercial purposes  The above information is an  only  It is not intended as medical advice for individual conditions or treatments  Talk to your doctor, nurse or pharmacist before following any medical regimen to see if it is safe and effective for you

## 2023-06-13 ENCOUNTER — APPOINTMENT (OUTPATIENT)
Dept: LAB | Facility: AMBULARY SURGERY CENTER | Age: 73
End: 2023-06-13
Payer: MEDICARE

## 2023-06-13 DIAGNOSIS — N18.4 STAGE 4 CHRONIC KIDNEY DISEASE (HCC): ICD-10-CM

## 2023-06-13 DIAGNOSIS — E78.00 ELEVATED CHOLESTEROL: ICD-10-CM

## 2023-06-13 LAB
ALBUMIN SERPL BCP-MCNC: 3.1 G/DL (ref 3.5–5)
ALP SERPL-CCNC: 53 U/L (ref 46–116)
ALT SERPL W P-5'-P-CCNC: 25 U/L (ref 12–78)
ANION GAP SERPL CALCULATED.3IONS-SCNC: 5 MMOL/L (ref 4–13)
AST SERPL W P-5'-P-CCNC: 21 U/L (ref 5–45)
BILIRUB SERPL-MCNC: 0.33 MG/DL (ref 0.2–1)
BUN SERPL-MCNC: 23 MG/DL (ref 5–25)
CALCIUM ALBUM COR SERPL-MCNC: 9.1 MG/DL (ref 8.3–10.1)
CALCIUM SERPL-MCNC: 8.4 MG/DL (ref 8.3–10.1)
CHLORIDE SERPL-SCNC: 109 MMOL/L (ref 96–108)
CK SERPL-CCNC: 60 U/L (ref 26–192)
CO2 SERPL-SCNC: 26 MMOL/L (ref 21–32)
CREAT SERPL-MCNC: 1.81 MG/DL (ref 0.6–1.3)
GFR SERPL CREATININE-BSD FRML MDRD: 27 ML/MIN/1.73SQ M
GLUCOSE P FAST SERPL-MCNC: 180 MG/DL (ref 65–99)
POTASSIUM SERPL-SCNC: 3.9 MMOL/L (ref 3.5–5.3)
PROT SERPL-MCNC: 6.6 G/DL (ref 6.4–8.4)
SODIUM SERPL-SCNC: 140 MMOL/L (ref 135–147)

## 2023-06-13 PROCEDURE — 80053 COMPREHEN METABOLIC PANEL: CPT

## 2023-06-13 PROCEDURE — 82550 ASSAY OF CK (CPK): CPT

## 2023-06-13 PROCEDURE — 36415 COLL VENOUS BLD VENIPUNCTURE: CPT

## 2023-06-14 ENCOUNTER — HOSPITAL ENCOUNTER (OUTPATIENT)
Dept: NON INVASIVE DIAGNOSTICS | Facility: CLINIC | Age: 73
Discharge: HOME | End: 2023-06-14
Payer: MEDICARE

## 2023-06-14 DIAGNOSIS — R07.9 CHEST PAIN, UNSPECIFIED TYPE: ICD-10-CM

## 2023-06-14 DIAGNOSIS — I49.3 PVC'S (PREMATURE VENTRICULAR CONTRACTIONS): ICD-10-CM

## 2023-06-14 DIAGNOSIS — Z79.4 TYPE 2 DIABETES MELLITUS WITH CHRONIC KIDNEY DISEASE, WITH LONG-TERM CURRENT USE OF INSULIN, UNSPECIFIED CKD STAGE (HCC): ICD-10-CM

## 2023-06-14 DIAGNOSIS — I10 ESSENTIAL HYPERTENSION: ICD-10-CM

## 2023-06-14 DIAGNOSIS — E11.22 TYPE 2 DIABETES MELLITUS WITH CHRONIC KIDNEY DISEASE, WITH LONG-TERM CURRENT USE OF INSULIN, UNSPECIFIED CKD STAGE (HCC): ICD-10-CM

## 2023-06-14 LAB
NUC STRESS EJECTION FRACTION: 63 %
RATE PRESSURE PRODUCT: 8580
SL CV REST NUCLEAR ISOTOPE DOSE: 11 MCI
SL CV STRESS NUCLEAR ISOTOPE DOSE: 32.1 MCI
SL CV STRESS RECOVERY BP: NORMAL MMHG
SL CV STRESS RECOVERY HR: 69 BPM
STRESS ANGINA INDEX: 0
STRESS BASELINE BP: NORMAL MMHG
STRESS BASELINE HR: 58 BPM
STRESS O2 SAT REST: 96 %
STRESS PEAK HR: 78 BPM
STRESS POST O2 SAT PEAK: 98 %
STRESS POST PEAK BP: 110 MMHG
STRESS/REST PERFUSION RATIO: 1.13

## 2023-06-14 PROCEDURE — 78452 HT MUSCLE IMAGE SPECT MULT: CPT | Performed by: INTERNAL MEDICINE

## 2023-06-14 PROCEDURE — 78452 HT MUSCLE IMAGE SPECT MULT: CPT

## 2023-06-14 PROCEDURE — 93018 CV STRESS TEST I&R ONLY: CPT | Performed by: INTERNAL MEDICINE

## 2023-06-14 PROCEDURE — 93017 CV STRESS TEST TRACING ONLY: CPT

## 2023-06-14 PROCEDURE — A9502 TC99M TETROFOSMIN: HCPCS

## 2023-06-14 PROCEDURE — G1004 CDSM NDSC: HCPCS

## 2023-06-14 PROCEDURE — 93016 CV STRESS TEST SUPVJ ONLY: CPT | Performed by: INTERNAL MEDICINE

## 2023-06-14 RX ORDER — REGADENOSON 0.08 MG/ML
0.4 INJECTION, SOLUTION INTRAVENOUS ONCE
Status: COMPLETED | OUTPATIENT
Start: 2023-06-14 | End: 2023-06-14

## 2023-06-14 RX ORDER — AMINOPHYLLINE DIHYDRATE 25 MG/ML
50 INJECTION, SOLUTION INTRAVENOUS ONCE
Status: COMPLETED | OUTPATIENT
Start: 2023-06-14 | End: 2023-06-14

## 2023-06-14 RX ADMIN — REGADENOSON 0.4 MG: 0.08 INJECTION, SOLUTION INTRAVENOUS at 14:01

## 2023-06-14 RX ADMIN — AMINOPHYLLINE 50 MG: 25 INJECTION, SOLUTION INTRAVENOUS at 14:06

## 2023-06-15 ENCOUNTER — TELEPHONE (OUTPATIENT)
Dept: INTERNAL MEDICINE CLINIC | Facility: CLINIC | Age: 73
End: 2023-06-15

## 2023-06-15 NOTE — TELEPHONE ENCOUNTER
Hi, my name is Marty Doshi  My birthday is [birth year]  I called yesterday and it different number  I'm having pain in my left breast and the small discharge from the nipple  My mammogram isn't scheduled until October  Please give me a call back and suggest what I should do  Thank you  My number is [phone]  That works

## 2023-06-18 PROBLEM — E11.21 DIABETIC NEPHROPATHY ASSOCIATED WITH TYPE 2 DIABETES MELLITUS (HCC): Status: ACTIVE | Noted: 2023-06-18

## 2023-06-18 NOTE — PROGRESS NOTES
RENAL FOLLOW UP NOTE: td    ASSESSMENT AND PLAN:  1   CKD stage 4 :  Etiology:  Diabetic nephropathy/hypertensive nephrosclerosis/arteriolar nephrosclerosis/chronic NSAID use   No evidence of obstructive uropathy, renal artery disease or primary glomerular disease with a bland urinalysis  Of note, CPK was normal at 105 no evidence rhabdomyolysis    Baseline creatinine: 1 5-2 0  Current creatinine:  1 81 at baseline (was higher 2 36)  Urine protein creatinine ratio: 2 12 g above goal but positive UTI which is simply repeat   UA demonstrated no significant hematuria but 3+ proteinuria from  Serologies:  Normal C3/C4; negative rheumatoid factor; negative SPEP:  Negative UPEP;  Light chain ratio 2 09 essentially normal for chronic kidney disease  negative EWELINA; negative hepatitis-C; normal IgA; normal ASO; negative RPR  Recommendations:  Treat hypertension-please see below  Treat dyslipidemia-please see below  Maintain proteinuria less than 1 g or as low as possible: Adjust antihypertensive regimen in order to try to get to proteinuria less than 1 g please see below  Avoid nephrotoxic agents such as NSAIDs, patient counseled as such  S/p kidney smart     2   Volume:   Current status:  Euvolemic  Torsemide dose:  Continue current dose 20 mg daily  3   Hypertension:  Workup:  saline suppression test for primary aldosterone state was normal  plasma free metanephrines was negative  renal artery duplex was negative 02/2019       Current blood pressure averages:   A m : 151/80, standing 140/76  P m : 152/79, standing 138/76  Heart rate: 60-70 range        Goal blood pressure:  less NFTR 333/04 given less than 1 g of proteinuria at this time  Recommendations:  Push nonmedical regimen including weight loss, isotonic exercise and avoidance of salt; patient counseled as such  Medication changes today:   Begin spironolactone 25 mg daily for proteinuria/hypertension and borderline low potassium monitor for side effects  Check blood pressures in 4 to 6 weeks  4   Electrolytes:  all acceptable including a potassium of 3 9  5   Mineral bone disorder:  Calcium/magnesium/phosphorus:  All acceptable,   PTH intact:   110 7 just monitor for now  Vitamin-D:  40 8  6   Dyslipidemia:  Goal LDL:  Less than 100  Current lipid profile: LDL 87/  Recommendations:  Per Endocrinology but essentially at goal  7   Anemia:   Current hemoglobin 11 2 stablel  Check iron studies patient followed closely by GI as well  8   Other problems:  Depression  Diabetes mellitus per primary medical physician: I would avoid SGLT2 inhibitors at the moment with frequent urinary tract infections  Hypothyroidism  BARBARA on CPAP  Fibromyalgia/osteoarthritis:  Patient with myoclonic movements, seems related to gabapentin it was adjusted with resolution    Nephrolithiasis  Basal cell/squamous CA of the skin  Urinary stress incontinence  Status post incisional hernia repairs  hospitalization as outlined below from severe GERD with hypoxemia and shortness of breath from a failed Nissen fundoplication from prior hiatal hernia repair   Patient is due to have further testing with an EGD by GI and then subsequently thoracic surgery consultation for possible repair(however, according to the patient at this juncture she was felt I risk so no surgery is planned at this time)  In addition, she was placed on Protonix 40 b i d  and Pepcid 40 mg hs  Hospitalization on 07/14/2020 secondary confusion at home   Apparently she had protracted hospital course in [place] following aspiration pneumonia   Ten days in the ICU on a ventilator   No overt infectious process   Low probability for pulmonary embolus despite an elevated D-dimer   Had mild leukocytosis/SIRS criteria subsequently discharged 1 day later when she clinically improved   Symptoms were felt secondary to her protracted ICU course    hospitalization with discharge on 05/18/21:  Epigastric pain following an EGD on 05/13/2021 for Botox injection at the pylorus for treatment for gastroparesis   Apparently associated with chocolate ingestion and possible cough with aspiration  Complicated by ANDRA with creatinine to 2 6 which improved with IV fluids TO 1 92 ON 05/18/2021   Hospitalization 04/13/2022:  With hypotension fatigue noted to have a low blood pressure mild increasing creatinine, dysuria treated with Bactrim but had an allergic reaction to it as an outpatient she received intravenous ceftriaxone and she was discharged on doxycycline  Recently placed on spironolactone for blood pressure all medications were held decide from metoprolol   Amlodipine was resumed, torsemide re-initiated, but spironolactone was stopped   Maintain off hydralazine possibly add olmesartan  Creatinine initially as high as 2 27 reduce down to 2 16 upon discharge  Patient admitted 1/7/2023 secondary headache and confusion left temporal symptoms and temporal artery tenderness with an elevated ESR started empirically on steroids per neurology recommendation status post temporal artery biopsy  However she developed a vesicular rash felt to have herpes zoster ophthalmologists started on Valtrex seen by ophthalmology eyedrops also initiated  Patient discharged on Valtrex and high-dose gabapentin with follow-up with her primary care physician  Patient with leukocytosis most compatible with steroids  LFT's normal           GI health maintenance:  Last colonoscopy: 7/30/2021    PATIENT INSTRUCTIONS:    Patient Instructions   1  Medication changes today:  Please begin spironolactone 25 mg daily in the morning for your blood pressure and protein in the urine  If you get breast soreness or enlargement more than usual please contact me! 2  Please go for non fasting  lab work 1-2 weeks after making the above medication change      3   Please take 1 week a blood pressure readings 4 to 6 weeks after starting the new medication    AS FOLLOWS  MORNING AND EVENING, SITTING AND STANDING as follows:  TAKE THE MORNING READINGS BEFORE ANY MEDICATIONS AND WHEN YOU ARE RELAXED FOR SEVERAL MINUTES  TAKE THE EVENING READINGS:  BETWEEN 7-10 P M ; PRIOR TO ANY MEDICATIONS; AT LEAST IN OUR  FROM DINNER; AND CERTAINLY AFTER RELAXING FOR A FEW MINUTES  PLEASE INCLUDE HEART RATE WITH YOUR BLOOD PRESSURE READINGS  When taking standing readings, keep your arm supported at heart level and not dangling  Make sure you are sitting with your back supported and feet on the ground and do not cross your legs or feet  Make sure you have not taken any coffee or caffeine products or exercised or smoke cigarettes at least 30 minutes before taking your blood pressure  Then please mail these readings into the office    4  In 3 months:  Please go for nonfasting lab work but in the morning  Please take 1 week a blood pressure readings as outlined above and mail those into the office      5  Follow-up in 6-7 months  Please bring in 1 week a blood pressure readings morning evening, sitting and standing is outlined above  PLEASE BRING AN YOUR BLOOD PRESSURE MACHINE TO CORRELATE WITH THE OFFICE MACHINE AT THIS NEXT SCHEDULED VISIT  Please go for fasting lab work 1-2 weeks prior to your appointment      6  General instructions:  AVOID SALT BUT NOT ADDING AN READING LABELS TO MAKE SURE THERE IS LOW-SALT IN THE FOOD THAT YOU ARE EATING  Goal is less than 2 g of sodium intake or less than 5 g of sodium chloride intake per day    Avoid nonsteroidal anti-inflammatory drugs such as Naprosyn, ibuprofen, Aleve, Advil, Celebrex, Meloxicam (Mobic) etc   You can use Tylenol as needed if you do not have any liver condition to be concerned about    Avoid medications such as Sudafed or decongestants and antihistamines that contained the D component which is the decongestant  You can take antihistamines without the decongestant or D component      Try to avoid medications such as pantoprazole or  Protonix/Nexium or "Esomeprazole)/Prilosec or omeprazole/Prevacid or lansoprazole/AcipHex or Rabeprazole  If you are able to, use Pepcid as this is safer for your kidneys  Try to exercise at least 30 minutes 3 days a week to begin with with an ultimate goal of 5 days a week for at least 30 minutes    Try to lose 5-10 lb by your next visit    Please do not drink more than 2 glasses of alcohol/wine on a daily basis as this may contribute to your high blood pressure  Subjective: There has been no hospitalizations or acute illnesses since last visit  The patient is fatigued for at least 6 months  Fair appetite  No fevers, chills, or cough or colds  Patient has urinary frequency and dysuria upon antibiotics by urology Augmentin, no blood in the urine, positive foamy urine  No gastrointestinal symptoms, occasional reflux on pantoprazole twice a day per GI  No chest pain, dyspnea on exertion slightly worse now followed closely by cardiology ischemic evaluation thus far negative, currently no edema  No headaches, dizziness or lightheadedness  Blood pressure medications:   Torsemide 5 mg daily in the morning  Olmesartan 40 mg daily in the morning  Metoprolol tartrate 50 mg twice a day  Amlodipine 5 mg at dinnertime      ROS:  See HPI, otherwise review of systems as completely reviewed with the patient are negative    Past Medical History:   Diagnosis Date   • Acute cystitis without hematuria 4/28/2023   • Acute-on-chronic kidney injury [facility])    • [facility] 5/8/2020   • Allergic    • Allergic rhinitis    • Anesthesia     pt reports \"had to use double lumen endo tube for hiatal hernia repair/so surgeon could get to where he needed to work\"   • Arthritis    • Aspiration into airway    • [place] esophagus 1993    Lot of stress with children   • Basal cell carcinoma 2007    left cheek    • BCC (basal cell carcinoma) 05/27/2021    Left Nasal tip   • Cancer ([facility] )     squamous cell cancer on forhead   • Cancer " ([place] [address] )     basal cell on nose    • Cholelithiasis    • Chronic kidney disease 2000, 2018    Stones, kidney disease stage 4   • Chronic pain disorder     bilat feet and joint pain on occas   • Colon polyp    • Constipation    • COPD (chronic obstructive pulmonary disease) ([place] [address] )    • COVID-19 08/2021    recovered at home/did receive monoclonal infusion   • COVID-19 virus infection 8/16/2021   • Dental bridge present    • Depression    • Diabetes mellitus ([place] [address] )     Type 2   • Diabetic neuropathy ([place] [address] )    • Disease of thyroid gland    • Dyspnea    • Fall 3/22/2023   • Family history of thyroid problem    • Fatty liver    • GERD (gastroesophageal reflux disease)    • Heart burn    • Hiatal hernia    • History of pneumonia    • Hyperlipidemia    • Hyperplasia, parathyroid ([place] [address] )    • Hypertension    • Hypotension 4/13/2022   • Kidney problem    • Kidney stone    • Memory loss Julu2 2020   • Motion sickness    • Motion sickness    • Neck pain    • Obesity 1978   • Obesity (BMI 30 0-34  9)    • Pollen allergies    • RLS (restless legs syndrome)    • SCC (squamous cell carcinoma) 05/04/2021    left mid forehead   • Seasonal allergies    • Sleep apnea     has inspire   • Squamous cell skin cancer 2007    left cheek    • Swollen ankles    • 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, left     great toe fusion   • Urinary incontinence    • Urinary tract infection 03/28/2022   • Wears glasses      Past Surgical History:   Procedure Laterality Date   • ABDOMINAL SURGERY  [id number]    Nissen x2 1972 tubal ligarion   • ARTHROSCOPY KNEE     • BREAST BIOPSY      stereotactic-benign   • BREAST BIOPSY      stereo-benign   • BREAST EXCISIONAL BIOPSY      unknown date-benign   • BREAST EXCISIONAL BIOPSY      unknown date-benign   • BREAST EXCISIONAL BIOPSY      unknown date-benign   • BREAST EXCISIONAL BIOPSY      unknown age-benign   • CARDIAC CATHETERIZATION     • CHOLECYSTECTOMY     • COLONOSCOPY     • EXAMINATION UNDER ANESTHESIA N/A 06/24/2021    Procedure: EXAM UNDER ANESTHESIA (EUA), DISE;  Surgeon: Brad Camp MD;  [facility];  Service: ENT   • HERNIA REPAIR     • HIATAL HERNIA REPAIR     • KNEE SURGERY      Torn maniscus lap surg   • LIPOSUCTION     • LYMPH NODE BIOPSY     • MOHS SURGERY  05/20/2021    left mid forehead-Gautam   • MOHS SURGERY Left 05/27/2021    Left nasal tip- gautam    • KY LIGATION/BIOPSY TEMPORAL ARTERY Left 01/05/2023    Procedure: BIOPSY ARTERY TEMPORAL;  Surgeon: Imer Angulo DO;  [facility];  Service: Vascular   • KY OPEN IMPLANTATION CRANIAL NERVE VASQUEZ & PULSE GEN N/A 11/10/2021    Procedure: INSERTION UPPER AIRWAY STIMULATOR, INSPIRE IMPLANT;  Surgeon: Brad Camp MD;  [facility];  Service: ENT   • KY UNLISTED PROCEDURE FOOT/TOES Right 07/19/2022    Procedure: 1st metatarsal phalangeal joint fusion;  Surgeon: Chelsie Landaverde DPM;  [facility];  Service: Podiatry   • REDUCTION MAMMAPLASTY Bilateral 2000   • REDUCTION MAMMAPLASTY     • SKIN BIOPSY     • SKIN CANCER EXCISION  2007    squamous cell carcinoma    • SKIN CANCER EXCISION  2007    basal cell carcinoma   • SQUAMOUS CELL CARCINOMA EXCISION     • TOE SURGERY Right 08/04/2022    Right Great Toe Fusion   • TONSILLECTOMY     • TUBAL LIGATION     • UPPER GASTROINTESTINAL ENDOSCOPY     • US GUIDED BREAST BIOPSY RIGHT COMPLETE Right 07/18/2022     Family History   Problem Relation Age of Onset   • Heart disease Mother    • Depression Mother    • Hypertension Mother    • COPD Mother    • Hearing loss Mother    • Anxiety disorder Mother    • Heart disease Father    • Lung cancer Father 79        Smoker    • Cancer Father         brain   • Alcohol abuse Father    • Dementia Father    • Hypertension Father    • Thyroid disease Father    • COPD Father    • Arthritis Father    • Brain cancer Father 76   • Hypertension Sister    • Diabetes Sister    • Heart disease Sister    • Thyroid disease Sister    • Cancer Sister Lympoma, lung   • Lung cancer Sister 78   • Anxiety disorder Sister    • Hypertension Brother    • Diabetes Brother    • Cancer Brother         Throat   • Dementia Brother    • Stroke Brother    • Hypertension Brother    • Heart disease Brother    • Diabetes Brother    • Hypertension Brother    • No Known Problems Son    • No Known Problems Son    • Brain cancer Paternal Aunt         unknown age   • Diabetes Sister    • Hypertension Sister    • Diabetes Brother    • Breast cancer Neg Hx       reports that she quit smoking about 47 years ago  Her smoking use included cigarettes  She started smoking about 55 years ago  She has a 20 00 pack-year smoking history  She has been exposed to tobacco smoke  She has never used smokeless tobacco  She reports current alcohol use  She reports that she does not use drugs  I COMPLETELY REVIEWED THE PAST MEDICAL HISTORY/PAST SURGICAL HISTORY/SOCIAL HISTORY/FAMILY HISTORY/AND MEDICATIONS  AND UPDATED ALL    Objective:     Vitals:   BP sitting on right: 160/74 with a heart rate of 60 and regular  BP standing on right: 150/70 with a heart rate of 60 and regular    Weight (last 2 days)     Date/Time Weight    06/28/23 1028 79 5 (175 2)        Wt Readings from Last 3 Encounters:   06/28/23 79 5 kg (175 lb 3 2 oz)   06/19/23 79 5 kg (175 lb 3 2 oz)   06/06/23 78 5 kg (173 lb 2 oz)       Body mass index is 30 07 kg/m²      Physical Exam: General:  No acute distress  Skin:  No acute rash  Eyes:  No scleral icterus, noninjected, no discharge from eyes  ENT:  Moist mucous membranes  Neck:  Supple, no jugular venous distention, trachea is midline, no lymphadenopathy and no thyromegaly  Back   No CVAT  Chest:  Clear to auscultation and percussion, good respiratory effort  CVS:  Regular rate and rhythm without a rub, or gallops or murmurs  Abdomen:  Soft and nontender with normal bowel sounds  Extremities:  No cyanosis and no edema, severe arthritic changes, normal range of motion  Neuro:  Grossly intact  Psych:  Alert, oriented x3 and appropriate      Medications:    Current Outpatient Medications:   •  acetaminophen (TYLENOL) 325 mg tablet, Take 3 tablets (975 mg total) by mouth every 8 (eight) hours, Disp: , Rfl: 0  •  albuterol (Ventolin HFA) 90 mcg/act inhaler, Inhale 2 puffs every 6 (six) hours as needed for wheezing, Disp: 54 g, Rfl: 0  •  amLODIPine (NORVASC) 5 mg tablet, Take 1 tablet (5 mg total) by mouth daily, Disp: 90 tablet, Rfl: 1  •  amoxicillin-clavulanate (AUGMENTIN) 875-125 mg per tablet, Take 1 tablet by mouth every 12 (twelve) hours for 7 days, Disp: 14 tablet, Rfl: 0  •  B-D ULTRAFINE III SHORT PEN 31G X 8 MM MISC, , Disp: , Rfl: 3  •  Calcium Citrate 1040 MG TABS, Take 500 mg by mouth Three times a day, Disp: , Rfl:   •  Coenzyme Q10 (CoQ10) 100 MG CAPS, Take 100 mg by mouth in the morning, Disp: , Rfl:   •  Cranberry 200 MG CAPS, Take 200 capsules by mouth 3 (three) times a day, Disp: , Rfl:   •  DULoxetine (CYMBALTA) 30 mg delayed release capsule, Take 60 mg by mouth daily, Disp: , Rfl:   •  ergocalciferol (ERGOCALCIFEROL) 1 25 MG (33059 UT) capsule, Take 1 capsule (50,000 Units total) by mouth once a week Weekly x 8 weeks then Vit D 2000 iu daily OTC thereafter, Disp: 8 capsule, Rfl: 0  •  estradiol (ESTRACE VAGINAL) 0 1 mg/g vaginal cream, Apply pea sized amount around urethra and inside vaginal opening 3 times weekly, Disp: 42 5 g, Rfl: 2  •  gabapentin (Neurontin) 300 mg capsule, Take 1 capsule (300 mg total) by mouth daily at bedtime, Disp: 90 capsule, Rfl: 0  •  Icosapent Ethyl 1 g CAPS, Take 1 capsule by mouth 2 (two) times a day, Disp: , Rfl:   •  insulin aspart (NovoLOG) 100 units/mL injection, Inject under the skin 3 (three) times a day before meals 16 units, 16 units, 25 units  , Disp: , Rfl:   •  insulin detemir (Levemir FlexTouch) 100 Units/mL injection pen, Inject 50 Units under the skin every evening , Disp: , Rfl:   •  levothyroxine 112 mcg "tablet, Take 112 mcg by mouth every morning, Disp: , Rfl:   •  metoprolol tartrate (LOPRESSOR) 50 mg tablet, Take 1 tablet (50 mg total) by mouth every 12 (twelve) hours, Disp: 180 tablet, Rfl: 3  •  olmesartan (BENICAR) 40 mg tablet, Take 1 tablet (40 mg total) by mouth daily, Disp: 30 tablet, Rfl: 1  •  pantoprazole (PROTONIX) 40 mg tablet, TAKE 1 TABLET BY MOUTH TWICE A DAY, Disp: 180 tablet, Rfl: 2  •  pramipexole (MIRAPEX) 0 25 mg tablet, Take 1 tablet (0 25 mg total) by mouth daily at bedtime, Disp: 90 tablet, Rfl: 2  •  Probiotic Product (PROBIOTIC PO), Take 1 capsule by mouth 2 (two) times a day, Disp: , Rfl:   •  rosuvastatin (CRESTOR) 5 mg tablet, Take 1 tablet (5 mg total) by mouth daily, Disp: 90 tablet, Rfl: 3  •  spironolactone (ALDACTONE) 25 mg tablet, Take 1 tablet (25 mg total) by mouth daily, Disp: 30 tablet, Rfl: 5  •  torsemide (DEMADEX) 10 mg tablet, Take 0 5 tablets (5 mg total) by mouth daily, Disp: 30 tablet, Rfl: 1  •  b complex vitamins capsule, Take 1 capsule by mouth daily (Patient not taking: Reported on 5/24/2023), Disp: , Rfl:   •  Vitamin D, Ergocalciferol, 2000 units CAPS, Take 2,000 Units by mouth daily  (Patient not taking: Reported on 6/6/2023), Disp: , Rfl:     Lab, Imaging and other studies: I have personally reviewed pertinent labs  Laboratory Results:  Results for orders placed or performed in visit on 06/23/23   Urine culture    Specimen: Urine, Clean Catch   Result Value Ref Range    Urine Culture >100,000 cfu/ml Lactobacillus species (A)     Urine Culture <10,000 cfu/ml              Invalid input(s): \"ALBUMIN\"      Radiology review:   chest X-ray    Ultrasound      Portions of the record may have been created with voice recognition software  Occasional wrong word or \"sound a like\" substitutions may have occurred due to the inherent limitations of voice recognition software    Read the chart carefully and recognize, using context, where substitutions have " occurred

## 2023-06-19 ENCOUNTER — OFFICE VISIT (OUTPATIENT)
Dept: INTERNAL MEDICINE CLINIC | Facility: CLINIC | Age: 73
End: 2023-06-19
Payer: MEDICARE

## 2023-06-19 VITALS
OXYGEN SATURATION: 96 % | TEMPERATURE: 97.1 F | WEIGHT: 175.2 LBS | BODY MASS INDEX: 30.07 KG/M2 | HEART RATE: 60 BPM | SYSTOLIC BLOOD PRESSURE: 142 MMHG | DIASTOLIC BLOOD PRESSURE: 70 MMHG

## 2023-06-19 DIAGNOSIS — N64.52 BREAST DISCHARGE: ICD-10-CM

## 2023-06-19 DIAGNOSIS — N64.4 BREAST PAIN: Primary | ICD-10-CM

## 2023-06-19 PROBLEM — N30.00 ACUTE CYSTITIS WITHOUT HEMATURIA: Status: RESOLVED | Noted: 2023-04-28 | Resolved: 2023-06-19
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
## 2023-06-19 PROBLEM — N17.9 AKI (ACUTE KIDNEY INJURY) (HCC): Status: RESOLVED | Noted: 2020-05-08 | Resolved: 2023-06-19

## 2023-06-19 PROBLEM — U07.1 COVID-19 VIRUS INFECTION: Status: RESOLVED | Noted: 2021-08-16 | Resolved: 2023-06-19

## 2023-06-19 PROCEDURE — 99214 OFFICE O/P EST MOD 30 MIN: CPT | Performed by: INTERNAL MEDICINE
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
## 2023-06-19 NOTE — PROGRESS NOTES
Assessment/Plan:    Breast discharge  Resolved, no breast discharge on examination today  If MMG is unrevealing will consider checking prolactin    Breast pain  No trauma or nodule noted, she has fibrocystic changes on examination and left axilary lymph adenomegaly, check diagnostic MMG  Diagnoses and all orders for this visit:    Breast pain  -     Mammo diagnostic bilateral w cad; Future    Breast discharge  -     Cancel: Mammo diagnostic bilateral w cad; Future  -     Mammo diagnostic bilateral w cad; Future          Subjective:      Patient ID: Coco Quevedo is a 75 y o  female  Patient presents in the office for a acute visit with complaints of left breat pain around nipple an behind nipple area for the past week, associated with one episode of milky discharge of one drop  She never had this pain before  She didn't noticed any lump  She denies any trauma in the breast  She is schedule for MMG but only in October, she denies family hx of breast cancer, she had bx done last year on the right side due to an asymmetry on screening MMG, but that was found to be benign  The following portions of the patient's history were reviewed and updated as appropriate: allergies, current medications, past family history, past medical history, past social history, past surgical history and problem list     Review of Systems   Constitutional: Negative for appetite change and fatigue  Eyes: Negative for visual disturbance  Respiratory: Negative for cough, chest tightness, shortness of breath and wheezing  Cardiovascular: Negative for chest pain, palpitations and leg swelling  Gastrointestinal: Negative for abdominal pain, nausea and vomiting  Musculoskeletal: Negative for arthralgias and joint swelling  Skin: Negative for rash  Left breast pain around nipple and milky discharge    Neurological: Negative for dizziness and headaches  Psychiatric/Behavioral: Negative for confusion  Objective:      /70 (BP Location: Right arm, Patient Position: Sitting, Cuff Size: Standard)   Pulse 60   Temp (!) 97 1 °F (36 2 °C)   Wt 79 5 kg (175 lb 3 2 oz)   SpO2 96%   BMI 30 07 kg/m²          Physical Exam  Vitals and nursing note reviewed  Constitutional:       Appearance: She is well-developed  HENT:      Head: Normocephalic and atraumatic  Eyes:      Conjunctiva/sclera: Conjunctivae normal       Pupils: Pupils are equal, round, and reactive to light  Neck:      Thyroid: No thyromegaly  Cardiovascular:      Rate and Rhythm: Normal rate and regular rhythm  Pulmonary:      Effort: Pulmonary effort is normal  No respiratory distress  Chest:   Breasts: Good Score is 5  Right: Normal  No nipple discharge  Left: Normal  No nipple discharge or tenderness  Comments: Bilateral breast reduction surgical scars, multiple scars over right breast and sleep device noticed on examination right below right clavicle  Abdominal:      General: There is no distension  Tenderness: There is no abdominal tenderness  Musculoskeletal:         General: Normal range of motion  Cervical back: Normal range of motion and neck supple  Lymphadenopathy:      Upper Body:      Right upper body: No axillary adenopathy  Left upper body: Axillary adenopathy present  Skin:     General: Skin is warm and dry  Capillary Refill: Capillary refill takes less than 2 seconds  Neurological:      Mental Status: She is alert and oriented to person, place, and time  Sensory: No sensory deficit  Motor: No abnormal muscle tone

## 2023-06-19 NOTE — ASSESSMENT & PLAN NOTE
Resolved, no breast discharge on examination today   If MMG is unrevealing will consider checking prolactin

## 2023-06-20 ENCOUNTER — TELEPHONE (OUTPATIENT)
Dept: INTERNAL MEDICINE CLINIC | Facility: CLINIC | Age: 73
End: 2023-06-20

## 2023-06-20 ENCOUNTER — CLINICAL SUPPORT (OUTPATIENT)
Dept: CARDIOLOGY CLINIC | Facility: CLINIC | Age: 73
End: 2023-06-20
Payer: MEDICARE

## 2023-06-20 DIAGNOSIS — R07.9 CHEST PAIN, UNSPECIFIED TYPE: ICD-10-CM

## 2023-06-20 DIAGNOSIS — I49.3 PVC'S (PREMATURE VENTRICULAR CONTRACTIONS): ICD-10-CM

## 2023-06-20 LAB
CHEST PAIN STATEMENT: NORMAL
MAX DIASTOLIC BP: 80 MMHG
MAX HEART RATE: 79 BPM
MAX PREDICTED HEART RATE: 146 BPM
MAX. SYSTOLIC BP: 154 MMHG
PROTOCOL NAME: NORMAL
REASON FOR TERMINATION: NORMAL
TARGET HR FORMULA: NORMAL
TEST INDICATION: NORMAL
TIME IN EXERCISE PHASE: NORMAL

## 2023-06-20 PROCEDURE — 93244 EXT ECG>48HR<7D REV&INTERPJ: CPT | Performed by: INTERNAL MEDICINE

## 2023-06-20 NOTE — TELEPHONE ENCOUNTER
[phone]  Patient called and asked us to call her back to confirm the mammogram is being rescheduled  I do see a new mammo was scheduled and I notified the patient via voicemail

## 2023-06-20 NOTE — TELEPHONE ENCOUNTER
An appointment has been set up for her but it is far out and they will contact the dept to move appointment up as it is a stat  I called the patient to inform her of that , also told her to call us if they dont contact her by tomorrow , they told me she will be contacted with in 24 hours

## 2023-06-21 DIAGNOSIS — I10 ESSENTIAL HYPERTENSION: ICD-10-CM

## 2023-06-21 RX ORDER — OLMESARTAN MEDOXOMIL 40 MG/1
40 TABLET ORAL DAILY
Qty: 30 TABLET | Refills: 1 | Status: SHIPPED | OUTPATIENT
Start: 2023-06-21 | End: 2023-08-20

## 2023-06-21 NOTE — TELEPHONE ENCOUNTER
Patient called again today and said no one called her from the [address], I called and left message on their machine , also central scheduling said 24-48 hours is when they call  I did call the patient and let her know what is going on, she will call us in the morning to let us know if they called

## 2023-06-23 ENCOUNTER — APPOINTMENT (OUTPATIENT)
Dept: LAB | Facility: AMBULARY SURGERY CENTER | Age: 73
End: 2023-06-23
Payer: MEDICARE

## 2023-06-23 ENCOUNTER — TELEPHONE (OUTPATIENT)
Dept: INTERNAL MEDICINE CLINIC | Facility: CLINIC | Age: 73
End: 2023-06-23

## 2023-06-23 NOTE — TELEPHONE ENCOUNTER
Hi, my name is Adriana Wayne. Phone number is [phone]. It looks like I got a missed missed call. I'm not sure, but I just wanted to update you on my mammogram scheduling. They called a couple days ago when they were supposed to and said the very earliest that they could get me in is July 16th, 18th, 19th. I'm not sure when. And I said OK, you know, so if she wants it sooner than that, something's going to have to happen. If not. That's all right, but I've got an appointment scheduled after the mammogram, but I won't have gotten in with cardio and I won't have gotten in yet. So if you give me a call back and tell me that's fine, then that's fine. And if not, then maybe we need to prolong a doctor cardio appointment again. My name is Sandhya Azar root phone number is [phone], Destiny Mcconnell is 1950. Thank you and have a good day.

## 2023-06-26 DIAGNOSIS — N39.0 URINARY TRACT INFECTION WITHOUT HEMATURIA, SITE UNSPECIFIED: Primary | ICD-10-CM

## 2023-06-26 RX ORDER — AMOXICILLIN AND CLAVULANATE POTASSIUM 875; 125 MG/1; MG/1
1 TABLET, FILM COATED ORAL EVERY 12 HOURS SCHEDULED
Qty: 14 TABLET | Refills: 0 | Status: SHIPPED | OUTPATIENT
Start: 2023-06-26 | End: 2023-07-03

## 2023-06-26 NOTE — TELEPHONE ENCOUNTER
I have ordered this imaging STAT, not sure why is taking 1 month, please reschedule patient if necessary, thank you!

## 2023-06-28 ENCOUNTER — OFFICE VISIT (OUTPATIENT)
Dept: NEPHROLOGY | Facility: CLINIC | Age: 73
End: 2023-06-28
Payer: MEDICARE

## 2023-06-28 VITALS — HEIGHT: 64 IN | WEIGHT: 175.2 LBS | BODY MASS INDEX: 29.91 KG/M2

## 2023-06-28 DIAGNOSIS — E11.21 DIABETIC NEPHROPATHY ASSOCIATED WITH TYPE 2 DIABETES MELLITUS (HCC): ICD-10-CM

## 2023-06-28 DIAGNOSIS — D63.1 ANEMIA IN STAGE 4 CHRONIC KIDNEY DISEASE (HCC): ICD-10-CM

## 2023-06-28 DIAGNOSIS — R80.1 PERSISTENT PROTEINURIA: ICD-10-CM

## 2023-06-28 DIAGNOSIS — E78.2 MIXED HYPERLIPIDEMIA: ICD-10-CM

## 2023-06-28 DIAGNOSIS — N18.4 STAGE 4 CHRONIC KIDNEY DISEASE (HCC): ICD-10-CM

## 2023-06-28 DIAGNOSIS — N18.4 ANEMIA IN STAGE 4 CHRONIC KIDNEY DISEASE (HCC): ICD-10-CM

## 2023-06-28 DIAGNOSIS — E55.9 VITAMIN D DEFICIENCY: ICD-10-CM

## 2023-06-28 DIAGNOSIS — I12.9 HYPERTENSIVE CHRONIC KIDNEY DISEASE WITH STAGE 1 THROUGH STAGE 4 CHRONIC KIDNEY DISEASE, OR UNSPECIFIED CHRONIC KIDNEY DISEASE: Primary | ICD-10-CM

## 2023-06-28 PROCEDURE — 99214 OFFICE O/P EST MOD 30 MIN: CPT | Performed by: INTERNAL MEDICINE

## 2023-06-28 RX ORDER — SPIRONOLACTONE 25 MG/1
25 TABLET ORAL DAILY
Qty: 30 TABLET | Refills: 5 | Status: SHIPPED | OUTPATIENT
Start: 2023-06-28

## 2023-06-28 NOTE — LETTER
June 28, 2023     Jessica Thompson MD  [facility]    Patient: Licha Douglas   YOB: 1950   Date of Visit: 6/28/2023       Dear Dr Basim Reich:    Thank you for referring [place] to me for evaluation  Below are my notes for this consultation  If you have questions, please do not hesitate to call me  I look forward to following your patient along with you  Sincerely,        Beverly Rajan MD        CC: MD Beverly Luong Cera, MD  6/28/2023 10:56 AM  Sign when Signing Visit  RENAL FOLLOW UP NOTE: td    ASSESSMENT AND PLAN:  1   CKD stage 4 :  Etiology:  Diabetic nephropathy/hypertensive nephrosclerosis/arteriolar nephrosclerosis/chronic NSAID use  No evidence of obstructive uropathy, renal artery disease or primary glomerular disease with a bland urinalysis  Of note, CPK was normal at 105 no evidence rhabdomyolysis  Baseline creatinine: 1 5-2 0  Current creatinine:  1 81 at baseline (was higher 2 36)  Urine protein creatinine ratio: 2 12 g above goal but positive UTI which is simply repeat   UA demonstrated no significant hematuria but 3+ proteinuria from  Serologies:  Normal C3/C4; negative rheumatoid factor; negative SPEP:  Negative UPEP;  Light chain ratio 2 09 essentially normal for chronic kidney disease  negative EWELINA; negative hepatitis-C; normal IgA; normal ASO; negative RPR  Recommendations:  Treat hypertension-please see below  Treat dyslipidemia-please see below  Maintain proteinuria less than 1 g or as low as possible: Adjust antihypertensive regimen in order to try to get to proteinuria less than 1 g please see below  Avoid nephrotoxic agents such as NSAIDs, patient counseled as such  S/p kidney smart     2  Volume:   Current status:  Euvolemic  Torsemide dose:  Continue current dose 20 mg daily  3    Hypertension:  Workup:  saline suppression test for primary aldosterone state was normal  plasma free metanephrines was negative  renal artery duplex was negative 02/2019       Current blood pressure averages:   A m : 151/80, standing 140/76  P m : 152/79, standing 138/76  Heart rate: 60-70 range        Goal blood pressure:  less than 125/75 given less than 1 g of proteinuria at this time  Recommendations:  Push nonmedical regimen including weight loss, isotonic exercise and avoidance of salt; patient counseled as such  Medication changes today:   Begin spironolactone 25 mg daily for proteinuria/hypertension and borderline low potassium monitor for side effects  Check blood pressures in 4 to 6 weeks  4   Electrolytes:  all acceptable including a potassium of 3 9  5  Mineral bone disorder:  Calcium/magnesium/phosphorus:  All acceptable,   PTH intact:   110 7 just monitor for now  Vitamin-D:  40 8  6  Dyslipidemia:  Goal LDL:  Less than 100  Current lipid profile: LDL 87/  Recommendations:  Per Endocrinology but essentially at goal  7  Anemia:   Current hemoglobin 11 2 stablel  Check iron studies patient followed closely by GI as well  8  Other problems:  Depression  Diabetes mellitus per primary medical physician: I would avoid SGLT2 inhibitors at the moment with frequent urinary tract infections  Hypothyroidism  BARBARA on CPAP  Fibromyalgia/osteoarthritis:  Patient with myoclonic movements, seems related to gabapentin it was adjusted with resolution  Nephrolithiasis  Basal cell/squamous CA of the skin  Urinary stress incontinence  Status post incisional hernia repairs  hospitalization as outlined below from severe GERD with hypoxemia and shortness of breath from a failed Nissen fundoplication from prior hiatal hernia repair    Patient is due to have further testing with an EGD by GI and then subsequently thoracic surgery consultation for possible repair(however, according to the patient at this juncture she was felt I risk so no surgery is planned at this time)  In addition, she was placed on Protonix 40 b i d  and Pepcid 40 mg hs  Hospitalization on 07/14/2020 secondary confusion at home  Apparently she had protracted hospital course in [place] following aspiration pneumonia  Ten days in the ICU on a ventilator  No overt infectious process  Low probability for pulmonary embolus despite an elevated D-dimer  Had mild leukocytosis/SIRS criteria subsequently discharged 1 day later when she clinically improved  Symptoms were felt secondary to her protracted ICU course  hospitalization with discharge on 05/18/21:  Epigastric pain following an EGD on 05/13/2021 for Botox injection at the pylorus for treatment for gastroparesis  Apparently associated with chocolate ingestion and possible cough with aspiration  Complicated by ANDRA with creatinine to 2 6 which improved with IV fluids TO 1 92 ON 05/18/2021   Hospitalization 04/13/2022: With hypotension fatigue noted to have a low blood pressure mild increasing creatinine, dysuria treated with Bactrim but had an allergic reaction to it as an outpatient she received intravenous ceftriaxone and she was discharged on doxycycline  Recently placed on spironolactone for blood pressure all medications were held decide from metoprolol  Amlodipine was resumed, torsemide re-initiated, but spironolactone was stopped  Maintain off hydralazine possibly add olmesartan  Creatinine initially as high as 2 27 reduce down to 2 16 upon discharge  Patient admitted 1/7/2023 secondary headache and confusion left temporal symptoms and temporal artery tenderness with an elevated ESR started empirically on steroids per neurology recommendation status post temporal artery biopsy  However she developed a vesicular rash felt to have herpes zoster ophthalmologists started on Valtrex seen by ophthalmology eyedrops also initiated  Patient discharged on Valtrex and high-dose gabapentin with follow-up with her primary care physician    Patient with leukocytosis most compatible with steroids  LFT's normal           GI health maintenance:  Last colonoscopy: 7/30/2021    PATIENT INSTRUCTIONS:    Patient Instructions   1  Medication changes today:  Please begin spironolactone 25 mg daily in the morning for your blood pressure and protein in the urine  If you get breast soreness or enlargement more than usual please contact me! 2  Please go for non fasting  lab work 1-2 weeks after making the above medication change  3   Please take 1 week a blood pressure readings 4 to 6 weeks after starting the new medication    AS FOLLOWS  MORNING AND EVENING, SITTING AND STANDING as follows:  TAKE THE MORNING READINGS BEFORE ANY MEDICATIONS AND WHEN YOU ARE RELAXED FOR SEVERAL MINUTES  TAKE THE EVENING READINGS:  BETWEEN 7-10 P M ; PRIOR TO ANY MEDICATIONS; AT LEAST IN OUR  FROM DINNER; AND CERTAINLY AFTER RELAXING FOR A FEW MINUTES  PLEASE INCLUDE HEART RATE WITH YOUR BLOOD PRESSURE READINGS  When taking standing readings, keep your arm supported at heart level and not dangling  Make sure you are sitting with your back supported and feet on the ground and do not cross your legs or feet  Make sure you have not taken any coffee or caffeine products or exercised or smoke cigarettes at least 30 minutes before taking your blood pressure  Then please mail these readings into the office    4  In 3 months:  Please go for nonfasting lab work but in the morning  Please take 1 week a blood pressure readings as outlined above and mail those into the office      5  Follow-up in 6-7 months  Please bring in 1 week a blood pressure readings morning evening, sitting and standing is outlined above  PLEASE BRING AN YOUR BLOOD PRESSURE MACHINE TO CORRELATE WITH THE OFFICE MACHINE AT THIS NEXT SCHEDULED VISIT  Please go for fasting lab work 1-2 weeks prior to your appointment      6    General instructions:  AVOID SALT BUT NOT ADDING AN READING LABELS TO MAKE SURE THERE IS LOW-SALT IN THE FOOD THAT YOU ARE EATING  Goal is less than 2 g of sodium intake or less than 5 g of sodium chloride intake per day    Avoid nonsteroidal anti-inflammatory drugs such as Naprosyn, ibuprofen, Aleve, Advil, Celebrex, Meloxicam (Mobic) etc   You can use Tylenol as needed if you do not have any liver condition to be concerned about    Avoid medications such as Sudafed or decongestants and antihistamines that contained the D component which is the decongestant  You can take antihistamines without the decongestant or D component  Try to avoid medications such as pantoprazole or  Protonix/Nexium or Esomeprazole)/Prilosec or omeprazole/Prevacid or lansoprazole/AcipHex or Rabeprazole  If you are able to, use Pepcid as this is safer for your kidneys  Try to exercise at least 30 minutes 3 days a week to begin with with an ultimate goal of 5 days a week for at least 30 minutes    Try to lose 5-10 lb by your next visit    Please do not drink more than 2 glasses of alcohol/wine on a daily basis as this may contribute to your high blood pressure  Subjective: There has been no hospitalizations or acute illnesses since last visit  The patient is fatigued for at least 6 months  Fair appetite  No fevers, chills, or cough or colds  Patient has urinary frequency and dysuria upon antibiotics by urology Augmentin, no blood in the urine, positive foamy urine  No gastrointestinal symptoms, occasional reflux on pantoprazole twice a day per GI  No chest pain, dyspnea on exertion slightly worse now followed closely by cardiology ischemic evaluation thus far negative, currently no edema  No headaches, dizziness or lightheadedness  Blood pressure medications:   Torsemide 5 mg daily in the morning  Olmesartan 40 mg daily in the morning  Metoprolol tartrate 50 mg twice a day  Amlodipine 5 mg at dinnertime      ROS:  See HPI, otherwise review of systems as completely reviewed with the patient are negative    Past Medical History:   Diagnosis Date   • Acute cystitis without hematuria "4/28/2023   • Acute-on-chronic kidney injury [facility])    • ANDRA (acute kidney injury) ([place] [id number] ) 5/8/2020   • Allergic    • Allergic rhinitis    • Anesthesia     pt reports \"had to use double lumen endo tube for hiatal hernia repair/so surgeon could get to where he needed to work\"   • Arthritis    • Aspiration into airway    • Ahumada esophagus 1993    Lot of stress with children   • Basal cell carcinoma 2007    left cheek    • BCC (basal cell carcinoma) 05/27/2021    Left Nasal tip   • Cancer (HCC)     squamous cell cancer on forhead   • Cancer ([place] [address] )     basal cell on nose    • Cholelithiasis    • Chronic kidney disease 2000, 2018    Stones, kidney disease stage 4   • Chronic pain disorder     bilat feet and joint pain on occas   • Colon polyp    • Constipation    • COPD (chronic obstructive pulmonary disease) ([place] [address] )    • COVID-19 08/2021    recovered at home/did receive monoclonal infusion   • COVID-19 virus infection 8/16/2021   • Dental bridge present    • Depression    • Diabetes mellitus ([place] [address] )     Type 2   • Diabetic neuropathy ([place] [address] )    • Disease of thyroid gland    • Dyspnea    • Fall 3/22/2023   • Family history of thyroid problem    • Fatty liver    • GERD (gastroesophageal reflux disease)    • Heart burn    • Hiatal hernia    • History of pneumonia    • Hyperlipidemia    • Hyperplasia, parathyroid ([place] [address] )    • Hypertension    • Hypotension 4/13/2022   • Kidney problem    • Kidney stone    • Memory loss Julu2 2020   • Motion sickness    • Motion sickness    • Neck pain    • Obesity 1978   • Obesity (BMI 30 0-34  9)    • Pollen allergies    • RLS (restless legs syndrome)    • SCC (squamous cell carcinoma) 05/04/2021    left mid forehead   • Seasonal allergies    • Sleep apnea     has inspire   • Squamous cell skin cancer 2007    left cheek    • Swollen ankles    • 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, left     great toe fusion   • Urinary incontinence    • Urinary tract infection 03/28/2022   • Wears glasses      Past " Surgical History:   Procedure Laterality Date   • ABDOMINAL SURGERY  [id number]    Nissen x2 1972 tubal ligarion   • ARTHROSCOPY KNEE     • BREAST BIOPSY      stereotactic-benign   • BREAST BIOPSY      stereo-benign   • BREAST EXCISIONAL BIOPSY      unknown date-benign   • BREAST EXCISIONAL BIOPSY      unknown date-benign   • BREAST EXCISIONAL BIOPSY      unknown date-benign   • BREAST EXCISIONAL BIOPSY      unknown age-benign   • CARDIAC CATHETERIZATION     • CHOLECYSTECTOMY     • COLONOSCOPY     • EXAMINATION UNDER ANESTHESIA N/A 06/24/2021    Procedure: EXAM UNDER ANESTHESIA (EUA), DISE;  Surgeon: Renetta Flores MD;  [facility];  Service: ENT   • HERNIA REPAIR     • HIATAL HERNIA REPAIR     • KNEE SURGERY      Torn maniscus lap surg   • LIPOSUCTION     • LYMPH NODE BIOPSY     • MOHS SURGERY  05/20/2021    left mid forehead-Gautam   • MOHS SURGERY Left 05/27/2021    Left nasal tip- gautam    • GA LIGATION/BIOPSY TEMPORAL ARTERY Left 01/05/2023    Procedure: BIOPSY ARTERY TEMPORAL;  Surgeon: Geoff Lucio DO;  [facility];  Service: Vascular   • GA OPEN IMPLANTATION CRANIAL NERVE VASQUEZ & PULSE GEN N/A 11/10/2021    Procedure: INSERTION UPPER AIRWAY STIMULATOR, INSPIRE IMPLANT;  Surgeon: Renetta Flores MD;  [facility];  Service: ENT   • GA UNLISTED PROCEDURE FOOT/TOES Right 07/19/2022    Procedure: 1st metatarsal phalangeal joint fusion;  Surgeon: Ruth Rose DPM;  [facility];  Service: Podiatry   • REDUCTION MAMMAPLASTY Bilateral 2000   • REDUCTION MAMMAPLASTY     • SKIN BIOPSY     • SKIN CANCER EXCISION  2007    squamous cell carcinoma    • SKIN CANCER EXCISION  2007    basal cell carcinoma   • SQUAMOUS CELL CARCINOMA EXCISION     • TOE SURGERY Right 08/04/2022    Right Great Toe Fusion   • TONSILLECTOMY     • TUBAL LIGATION     • UPPER GASTROINTESTINAL ENDOSCOPY     • US GUIDED BREAST BIOPSY RIGHT COMPLETE Right 07/18/2022     Family History   Problem Relation Age of Onset   • Heart disease Mother    • Depression Mother    • Hypertension Mother    • COPD Mother    • Hearing loss Mother    • Anxiety disorder Mother    • Heart disease Father    • Lung cancer Father 79        Smoker    • Cancer Father         brain   • Alcohol abuse Father    • Dementia Father    • Hypertension Father    • Thyroid disease Father    • COPD Father    • Arthritis Father    • Brain cancer Father 76   • Hypertension Sister    • Diabetes Sister    • Heart disease Sister    • Thyroid disease Sister    • Cancer Sister         Lympoma, lung   • Lung cancer Sister 78   • Anxiety disorder Sister    • Hypertension Brother    • Diabetes Brother    • Cancer Brother         Throat   • Dementia Brother    • Stroke Brother    • Hypertension Brother    • Heart disease Brother    • Diabetes Brother    • Hypertension Brother    • No Known Problems Son    • No Known Problems Son    • Brain cancer Paternal Aunt         unknown age   • Diabetes Sister    • Hypertension Sister    • Diabetes Brother    • Breast cancer Neg Hx       reports that she quit smoking about 47 years ago  Her smoking use included cigarettes  She started smoking about 55 years ago  She has a 20 00 pack-year smoking history  She has been exposed to tobacco smoke  She has never used smokeless tobacco  She reports current alcohol use  She reports that she does not use drugs  I COMPLETELY REVIEWED THE PAST MEDICAL HISTORY/PAST SURGICAL HISTORY/SOCIAL HISTORY/FAMILY HISTORY/AND MEDICATIONS  AND UPDATED ALL    Objective:     Vitals:   BP sitting on right: 160/74 with a heart rate of 60 and regular  BP standing on right: 150/70 with a heart rate of 60 and regular    Weight (last 2 days)       Date/Time Weight    06/28/23 1028 79 5 (175 2)          Wt Readings from Last 3 Encounters:   06/28/23 79 5 kg (175 lb 3 2 oz)   06/19/23 79 5 kg (175 lb 3 2 oz)   06/06/23 78 5 kg (173 lb 2 oz)       Body mass index is 30 07 kg/m²      Physical Exam: General:  No acute distress  Skin:  No acute rash  Eyes:  No scleral icterus, noninjected, no discharge from eyes  ENT:  Moist mucous membranes  Neck:  Supple, no jugular venous distention, trachea is midline, no lymphadenopathy and no thyromegaly  Back   No CVAT  Chest:  Clear to auscultation and percussion, good respiratory effort  CVS:  Regular rate and rhythm without a rub, or gallops or murmurs  Abdomen:  Soft and nontender with normal bowel sounds  Extremities:  No cyanosis and no edema, severe arthritic changes, normal range of motion  Neuro:  Grossly intact  Psych:  Alert, oriented x3 and appropriate      Medications:    Current Outpatient Medications:   •  acetaminophen (TYLENOL) 325 mg tablet, Take 3 tablets (975 mg total) by mouth every 8 (eight) hours, Disp: , Rfl: 0  •  albuterol (Ventolin HFA) 90 mcg/act inhaler, Inhale 2 puffs every 6 (six) hours as needed for wheezing, Disp: 54 g, Rfl: 0  •  amLODIPine (NORVASC) 5 mg tablet, Take 1 tablet (5 mg total) by mouth daily, Disp: 90 tablet, Rfl: 1  •  amoxicillin-clavulanate (AUGMENTIN) 875-125 mg per tablet, Take 1 tablet by mouth every 12 (twelve) hours for 7 days, Disp: 14 tablet, Rfl: 0  •  B-D ULTRAFINE III SHORT PEN 31G X 8 MM MISC, , Disp: , Rfl: 3  •  Calcium Citrate 1040 MG TABS, Take 500 mg by mouth Three times a day, Disp: , Rfl:   •  Coenzyme Q10 (CoQ10) 100 MG CAPS, Take 100 mg by mouth in the morning, Disp: , Rfl:   •  Cranberry 200 MG CAPS, Take 200 capsules by mouth 3 (three) times a day, Disp: , Rfl:   •  DULoxetine (CYMBALTA) 30 mg delayed release capsule, Take 60 mg by mouth daily, Disp: , Rfl:   •  ergocalciferol (ERGOCALCIFEROL) 1 25 MG (02991 UT) capsule, Take 1 capsule (50,000 Units total) by mouth once a week Weekly x 8 weeks then Vit D 2000 iu daily OTC thereafter, Disp: 8 capsule, Rfl: 0  •  estradiol (ESTRACE VAGINAL) 0 1 mg/g vaginal cream, Apply pea sized amount around urethra and inside vaginal opening 3 times weekly, Disp: 42 5 g, Rfl: 2  •  gabapentin (Neurontin) 300 mg capsule, Take 1 capsule (300 mg total) by mouth daily at bedtime, Disp: 90 capsule, Rfl: 0  •  Icosapent Ethyl 1 g CAPS, Take 1 capsule by mouth 2 (two) times a day, Disp: , Rfl:   •  insulin aspart (NovoLOG) 100 units/mL injection, Inject under the skin 3 (three) times a day before meals 16 units, 16 units, 25 units  , Disp: , Rfl:   •  insulin detemir (Levemir FlexTouch) 100 Units/mL injection pen, Inject 50 Units under the skin every evening , Disp: , Rfl:   •  levothyroxine 112 mcg tablet, Take 112 mcg by mouth every morning, Disp: , Rfl:   •  metoprolol tartrate (LOPRESSOR) 50 mg tablet, Take 1 tablet (50 mg total) by mouth every 12 (twelve) hours, Disp: 180 tablet, Rfl: 3  •  olmesartan (BENICAR) 40 mg tablet, Take 1 tablet (40 mg total) by mouth daily, Disp: 30 tablet, Rfl: 1  •  pantoprazole (PROTONIX) 40 mg tablet, TAKE 1 TABLET BY MOUTH TWICE A DAY, Disp: 180 tablet, Rfl: 2  •  pramipexole (MIRAPEX) 0 25 mg tablet, Take 1 tablet (0 25 mg total) by mouth daily at bedtime, Disp: 90 tablet, Rfl: 2  •  Probiotic Product (PROBIOTIC PO), Take 1 capsule by mouth 2 (two) times a day, Disp: , Rfl:   •  rosuvastatin (CRESTOR) 5 mg tablet, Take 1 tablet (5 mg total) by mouth daily, Disp: 90 tablet, Rfl: 3  •  spironolactone (ALDACTONE) 25 mg tablet, Take 1 tablet (25 mg total) by mouth daily, Disp: 30 tablet, Rfl: 5  •  torsemide (DEMADEX) 10 mg tablet, Take 0 5 tablets (5 mg total) by mouth daily, Disp: 30 tablet, Rfl: 1  •  b complex vitamins capsule, Take 1 capsule by mouth daily (Patient not taking: Reported on 5/24/2023), Disp: , Rfl:   •  Vitamin D, Ergocalciferol, 2000 units CAPS, Take 2,000 Units by mouth daily  (Patient not taking: Reported on 6/6/2023), Disp: , Rfl:     Lab, Imaging and other studies: I have personally reviewed pertinent labs    Laboratory Results:  Results for orders placed or performed in visit on 06/23/23   Urine culture    Specimen: Urine, "Clean Catch   Result Value Ref Range    Urine Culture >100,000 cfu/ml Lactobacillus species (A)     Urine Culture <10,000 cfu/ml              Invalid input(s): \"ALBUMIN\"      Radiology review:   chest X-ray    Ultrasound      Portions of the record may have been created with voice recognition software  Occasional wrong word or \"sound a like\" substitutions may have occurred due to the inherent limitations of voice recognition software  Read the chart carefully and recognize, using context, where substitutions have occurred                      "

## 2023-06-28 NOTE — PATIENT INSTRUCTIONS
1  Medication changes today:  Please begin spironolactone 25 mg daily in the morning for your blood pressure and protein in the urine  If you get breast soreness or enlargement more than usual please contact me! 2  Please go for non fasting  lab work 1-2 weeks after making the above medication change  3   Please take 1 week a blood pressure readings 4 to 6 weeks after starting the new medication    AS FOLLOWS  MORNING AND EVENING, SITTING AND STANDING as follows:  TAKE THE MORNING READINGS BEFORE ANY MEDICATIONS AND WHEN YOU ARE RELAXED FOR SEVERAL MINUTES  TAKE THE EVENING READINGS:  BETWEEN 7-10 P M ; PRIOR TO ANY MEDICATIONS; AT LEAST IN OUR  FROM DINNER; AND CERTAINLY AFTER RELAXING FOR A FEW MINUTES  PLEASE INCLUDE HEART RATE WITH YOUR BLOOD PRESSURE READINGS  When taking standing readings, keep your arm supported at heart level and not dangling  Make sure you are sitting with your back supported and feet on the ground and do not cross your legs or feet  Make sure you have not taken any coffee or caffeine products or exercised or smoke cigarettes at least 30 minutes before taking your blood pressure  Then please mail these readings into the office    4  In 3 months:  Please go for nonfasting lab work but in the morning  Please take 1 week a blood pressure readings as outlined above and mail those into the office      5  Follow-up in 6-7 months  Please bring in 1 week a blood pressure readings morning evening, sitting and standing is outlined above  PLEASE BRING AN YOUR BLOOD PRESSURE MACHINE TO CORRELATE WITH THE OFFICE MACHINE AT THIS NEXT SCHEDULED VISIT  Please go for fasting lab work 1-2 weeks prior to your appointment      6    General instructions:  AVOID SALT BUT NOT ADDING AN READING LABELS TO MAKE SURE THERE IS LOW-SALT IN THE FOOD THAT YOU ARE EATING  Goal is less than 2 g of sodium intake or less than 5 g of sodium chloride intake per day    Avoid nonsteroidal anti-inflammatory drugs such as Naprosyn, ibuprofen, Aleve, Advil, Celebrex, Meloxicam (Mobic) etc   You can use Tylenol as needed if you do not have any liver condition to be concerned about    Avoid medications such as Sudafed or decongestants and antihistamines that contained the D component which is the decongestant  You can take antihistamines without the decongestant or D component  Try to avoid medications such as pantoprazole or  Protonix/Nexium or Esomeprazole)/Prilosec or omeprazole/Prevacid or lansoprazole/AcipHex or Rabeprazole  If you are able to, use Pepcid as this is safer for your kidneys  Try to exercise at least 30 minutes 3 days a week to begin with with an ultimate goal of 5 days a week for at least 30 minutes    Try to lose 5-10 lb by your next visit    Please do not drink more than 2 glasses of alcohol/wine on a daily basis as this may contribute to your high blood pressure

## 2023-06-29 ENCOUNTER — OFFICE VISIT (OUTPATIENT)
Dept: DERMATOLOGY | Facility: CLINIC | Age: 73
End: 2023-06-29

## 2023-06-29 VITALS — BODY MASS INDEX: 30.83 KG/M2 | TEMPERATURE: 97.8 F | WEIGHT: 174 LBS | HEIGHT: 63 IN

## 2023-06-29 DIAGNOSIS — D48.5 NEOPLASM OF UNCERTAIN BEHAVIOR OF SKIN: ICD-10-CM

## 2023-06-29 DIAGNOSIS — L57.0 KERATOSIS, ACTINIC: ICD-10-CM

## 2023-06-29 DIAGNOSIS — D22.5 MULTIPLE BENIGN NEVI OF UPPER EXTREMITY, LOWER EXTREMITY, AND TRUNK: ICD-10-CM

## 2023-06-29 DIAGNOSIS — L81.4 LENTIGINES: ICD-10-CM

## 2023-06-29 DIAGNOSIS — L82.1 SEBORRHEIC KERATOSIS: Primary | ICD-10-CM

## 2023-06-29 DIAGNOSIS — D22.60 MULTIPLE BENIGN NEVI OF UPPER EXTREMITY, LOWER EXTREMITY, AND TRUNK: ICD-10-CM

## 2023-06-29 DIAGNOSIS — Z85.828 HISTORY OF BASAL CELL CARCINOMA: ICD-10-CM

## 2023-06-29 DIAGNOSIS — D18.01 CHERRY ANGIOMA: ICD-10-CM

## 2023-06-29 DIAGNOSIS — Z86.007 HISTORY OF SQUAMOUS CELL CARCINOMA IN SITU: ICD-10-CM

## 2023-06-29 DIAGNOSIS — D22.70 MULTIPLE BENIGN NEVI OF UPPER EXTREMITY, LOWER EXTREMITY, AND TRUNK: ICD-10-CM

## 2023-06-29 PROCEDURE — 88342 IMHCHEM/IMCYTCHM 1ST ANTB: CPT | Performed by: PATHOLOGY

## 2023-06-29 PROCEDURE — 88305 TISSUE EXAM BY PATHOLOGIST: CPT | Performed by: PATHOLOGY

## 2023-06-29 NOTE — PATIENT INSTRUCTIONS
1  SEBORRHEIC KERATOSES  - Relevant exam: Scattered over the trunk/extremities are waxy brown to black plaques and papules with stuck on appearance  - Exam and clinical history consistent with seborrheic keratoses  - Counseled that these are benign growths that do not require treatment  - Counseled that removal of lesions is considered cosmetic and so would incur a fee should patient elect to move forward  - Patient to hold on treatments for now but will inform us should they desire additional treatments    2  MELANOCYTIC NEVI  -Relevant exam: Scattered over the trunk/extremities are homogenously pigmented brown macules and papules  ELM performed and without concerning findings  - Exam and clinical history consistent with melanocytic nevi  - Educated on the ABCDE's of melanoma; handout provided  - Counseled to return to clinic prior to scheduled appointment should any of these lesions change or should any new lesions of concern arise  - Counseled on use of sun protection daily  Reviewed latest FDA sunscreen guidelines, including use of broad spectrum (UVA and UVB blocking) sunscreen or sun protective clothing with SPF 30-50 every 2-3 hours and reapplied after exposure to water; use of photoprotective clothing, including a broad brim hat and UPF rated clothing if outdoors for several hours; avoid use of tanning beds as these pose significant risk for melanoma and skin cancer  3 LENTIGINES  OTHER SKIN CHANGES DUE TO CHRONIC EXPOSURE TO NONIONIZING RADIATION  - Relevant exam: Over sun exposed areas are brown macules  ELM performed and without concerning findings  - Exam and clinical history consistent with lentigines  - Educated that these are indicative of prior sun exposure  - Counseled to return to clinic prior to scheduled appointment should any of these lesions change or should any new lesions of concern arise   - Recommended use of sunscreen as above and below    - Counseled on use of sun protection daily  Reviewed latest FDA sunscreen guidelines, including use of broad spectrum (UVA and UVB blocking) sunscreen or sun protective clothing with SPF 30-50 every 2-3 hours and reapplied after exposure to water; use of photoprotective clothing, including a broad brim hat and UPF rated clothing if outdoors for several hours; avoid use of tanning beds as these pose significant risk for melanoma and skin cancer  4 CHERRY ANGIOMAS  - Relevant exam: Scattered over the trunk/extremities are red papules  - Exam and clinical history consistent with cherry angiomas  - Educated that these are benign  - Educated that removal is considered aesthetic and would incur a fee  - Patient does not wish to pursue removal at this time but will contact us should this change  5 ACTINIC KERATOSES  - Relevant exam: On the Forehead and nose are gritty pink papules  - Exam and clinical history consistent with actinic keratoses  - Discussed that these lesions are considered premalignant with the potential to evolve into squamous cell carcinoma  - Discussed that these are due to chronic sun exposure and therefore recommend use of sunscreen/sun protection to prevent further sun damage  - Discussed treatment options, including liquid nitrogen destruction, topical immunotherapy, and photodynamic therapy, including risks, benefits    Liquid nitrogen was applied for 10-12 seconds to the skin lesion and the expected blistering or scabbing reaction explained  Do not pick at the area  Patient reminded to expect hypopigmented scars from the procedure  Return if lesion fails to fully resolve  6 HISTORY OF NMSC (SCCIS, BCC)  - Relevant exam: Areas of prior NMSC treatment without evidence of recurrence  - Counseled on use of sun protection daily   Reviewed latest FDA sunscreen guidelines, including use of broad spectrum (UVA and UVB blocking) sunscreen or sun protective clothing with SPF 30-50 every 2-3 hours and reapplied after exposure "to water; use of photoprotective clothing, including a broad brim hat and UPF rated clothing if outdoors for several hours; avoid use of tanning beds as these pose significant risk for melanoma and skin cancer   - Counseled to return to clinic prior to scheduled appointment should any of these areas change or should any new lesions of concern arise    NEOPLASM OF UNCERTAIN BEHAVIOR OF SKIN    Physical Exam:  Anatomic Location Affected:  ; Left chest 0 7 cm x 0 5 cm pink plaque with white shiny structures; Differential diagnosis; Rule out Basal cell carcinoma    - Educated patient that given history and physical exam, recommendation is to biopsy lesion for definitive diagnosis  However, educated that in rare instances, we cannot determine the exact diagnosis after biopsy  INFORMED CONSENT DISCUSSION AND POST-OPERATIVE INSTRUCTIONS FOR PATIENT    I   RATIONALE FOR PROCEDURE  I understand that a skin biopsy allows the Dermatologist to test a lesion or rash under the microscope to obtain a diagnosis  It usually involves numbing the area with numbing medication and removing a small piece of skin; sometimes the area will be closed with sutures  In this specific procedure, sutures are not usually needed  If any sutures are placed, then they are usually need to be removed in 2 weeks or less  I understand that my Dermatologist recommends that a skin \"shave\" biopsy be performed today  A local anesthetic, similar to the kind that a dentist uses when filling a cavity, will be injected with a very small needle into the skin area to be sampled  The injected skin and tissue underneath \"will go to sleep” and become numb so no pain should be felt afterwards  An instrument shaped like a tiny \"razor blade\" (shave biopsy instrument) will be used to cut a small piece of tissue and skin from the area so that a sample of tissue can be taken and examined more closely under the microscope    A slight amount of bleeding will " "occur, but it will be stopped with direct pressure and a pressure bandage and any other appropriate methods  I understands that a scar will form where the wound was created  Surgical ointment will be applied to help protect the wound  Sutures are not usually needed  II   RISKS AND POTENTIAL COMPLICATIONS   I understand the risks and potential complications of a skin biopsy include but are not limited to the following:  Bleeding  Infection  Pain  Scar/keloid  Skin discoloration  Incomplete Removal  Recurrence  Nerve Damage/Numbness/Loss of Function  Allergic Reaction to Anesthesia  Biopsies are diagnostic procedures and based on findings additional treatment or evaluation may be required  Loss or destruction of specimen resulting in no additional findings    My Dermatologist has explained to me the nature of the condition, the nature of the procedure, and the benefits to be reasonably expected compared with alternative approaches  My Dermatologist has discussed the likelihood of major risks or complications of this procedure including the specific risks listed above, such as bleeding, infection, and scarring/keloid  I understand that a scar is expected after this procedure  I understand that my physician cannot predict if the scar will form a \"keloid,\" which extends beyond the borders of the wound that is created  A keloid is a thick, painful, and bumpy scar  A keloid can be difficult to treat, as it does not always respond well to therapy, which includes injecting cortisone directly into the keloid every few weeks  While this usually reduces the pain and size of the scar, it does not eliminate it  I understand that photographs may be taken before and after the procedure  These will be maintained as part of the medical providers confidential records and may not be made available to me    I further authorize the medical provider to use the photographs for teaching purposes or to illustrate scientific " "papers, books, or lectures if in his/her judgment, medical research, education, or science may benefit from its use  I have had an opportunity to fully inquire about the risks and benefits of this procedure and its alternatives  I have been given ample time and opportunity to ask questions and to seek a second opinion if I wished to do so  I acknowledge that there have specifically been no guarantees as to the cosmetic results from the procedure  I am aware that with any procedure there is always the possibility of an unexpected complication  III  POST-PROCEDURAL CARE (WHAT YOU WILL NEED TO DO \"AFTER THE BIOPSY\" TO OPTIMIZE HEALING)    Keep the area clean and dry  Try NOT to remove the bandage or get it wet for the first 24 hours  Gently clean the area and apply surgical ointment (such as Vaseline petrolatum ointment, which is available \"over the counter\" and not a prescription) to the biopsy site for up to 2 weeks straight  This acts to protect the wound from the outside world  Sutures are not usually placed in this procedure  If any sutures were placed, return for suture removal as instructed (generally 1 week for the face, 2 weeks for the body)  Take Acetaminophen (Tylenol) for discomfort, if no contraindications  Ibuprofen or aspirin could make bleeding worse  Call our office immediately for signs of infection: fever, chills, increased redness, warmth, tenderness, discomfort/pain, or pus or foul smell coming from the wound  WHAT TO DO IF THERE IS ANY BLEEDING? If a small amount of bleeding is noticed, place a clean cloth over the area and apply firm pressure for ten minutes  Check the wound after 10 minutes of direct pressure  If bleeding persists, try one more time for an additional 10 minutes of direct pressure on the area    If the bleeding becomes heavier or does not stop after the second attempt, or if you have any other questions about this procedure, then please call " your [facility] Dermatologist by calling [phone] (SKIN)

## 2023-06-29 NOTE — PROGRESS NOTES
"[facility] Note     Patient Name: Misha Riojas  Encounter Date: 6/29/2023     Have you been cared for by a [place] [address] Dermatologist in the last 3 years and, if so, which description applies to you? Yes  I have been here within the last 3 years, and my medical history has NOT changed since that time  I am FEMALE/of child-bearing potential     REVIEW OF SYSTEMS:  Have you recently had or currently have any of the following? · No changes in my recent health  PAST MEDICAL HISTORY:  Have you personally ever had or currently have any of the following? If \"YES,\" then please provide more detail  · No changes in my medical history  FAMILY HISTORY:  Any \"first degree relatives\" (parent, brother, sister, or child) with the following? • No changes in my family's known health  PATIENT EXPERIENCE:    • Do you want the Dermatologist to perform a COMPLETE skin exam today including a clinical examination under the \"bra and underwear\" areas? Yes  • If necessary, do we have your permission to call and leave a detailed message on your Preferred Phone number that includes your specific medical information?   Yes      Allergies   Allergen Reactions   • Sulfamethoxazole-Trimethoprim Other (See Comments)     Tongue swelling   • Ciprofloxacin Hives and Itching      Current Outpatient Medications:   •  acetaminophen (TYLENOL) 325 mg tablet, Take 3 tablets (975 mg total) by mouth every 8 (eight) hours, Disp: , Rfl: 0  •  albuterol (Ventolin HFA) 90 mcg/act inhaler, Inhale 2 puffs every 6 (six) hours as needed for wheezing, Disp: 54 g, Rfl: 0  •  amLODIPine (NORVASC) 5 mg tablet, Take 1 tablet (5 mg total) by mouth daily, Disp: 90 tablet, Rfl: 1  •  amoxicillin-clavulanate (AUGMENTIN) 875-125 mg per tablet, Take 1 tablet by mouth every 12 (twelve) hours for 7 days, Disp: 14 tablet, Rfl: 0  •  B-D ULTRAFINE III SHORT PEN 31G X 8 MM MISC, , Disp: , Rfl: 3  •  Calcium Citrate 1040 MG TABS, Take 500 mg by mouth " Three times a day, Disp: , Rfl:   •  Coenzyme Q10 (CoQ10) 100 MG CAPS, Take 100 mg by mouth in the morning, Disp: , Rfl:   •  Cranberry 200 MG CAPS, Take 200 capsules by mouth 3 (three) times a day, Disp: , Rfl:   •  DULoxetine (CYMBALTA) 30 mg delayed release capsule, Take 60 mg by mouth daily, Disp: , Rfl:   •  ergocalciferol (ERGOCALCIFEROL) 1 25 MG (38937 UT) capsule, Take 1 capsule (50,000 Units total) by mouth once a week Weekly x 8 weeks then Vit D 2000 iu daily OTC thereafter, Disp: 8 capsule, Rfl: 0  •  estradiol (ESTRACE VAGINAL) 0 1 mg/g vaginal cream, Apply pea sized amount around urethra and inside vaginal opening 3 times weekly, Disp: 42 5 g, Rfl: 2  •  gabapentin (Neurontin) 300 mg capsule, Take 1 capsule (300 mg total) by mouth daily at bedtime, Disp: 90 capsule, Rfl: 0  •  Icosapent Ethyl 1 g CAPS, Take 1 capsule by mouth 2 (two) times a day, Disp: , Rfl:   •  insulin aspart (NovoLOG) 100 units/mL injection, Inject under the skin 3 (three) times a day before meals 16 units, 16 units, 25 units  , Disp: , Rfl:   •  insulin detemir (Levemir FlexTouch) 100 Units/mL injection pen, Inject 50 Units under the skin every evening , Disp: , Rfl:   •  levothyroxine 112 mcg tablet, Take 112 mcg by mouth every morning, Disp: , Rfl:   •  metoprolol tartrate (LOPRESSOR) 50 mg tablet, Take 1 tablet (50 mg total) by mouth every 12 (twelve) hours, Disp: 180 tablet, Rfl: 3  •  olmesartan (BENICAR) 40 mg tablet, Take 1 tablet (40 mg total) by mouth daily, Disp: 30 tablet, Rfl: 1  •  pantoprazole (PROTONIX) 40 mg tablet, TAKE 1 TABLET BY MOUTH TWICE A DAY, Disp: 180 tablet, Rfl: 2  •  pramipexole (MIRAPEX) 0 25 mg tablet, Take 1 tablet (0 25 mg total) by mouth daily at bedtime, Disp: 90 tablet, Rfl: 2  •  Probiotic Product (PROBIOTIC PO), Take 1 capsule by mouth 2 (two) times a day, Disp: , Rfl:   •  rosuvastatin (CRESTOR) 5 mg tablet, Take 1 tablet (5 mg total) by mouth daily, Disp: 90 tablet, Rfl: 3  •  spironolactone (ALDACTONE) 25 mg tablet, Take 1 tablet (25 mg total) by mouth daily, Disp: 30 tablet, Rfl: 5  •  torsemide (DEMADEX) 10 mg tablet, Take 0 5 tablets (5 mg total) by mouth daily, Disp: 30 tablet, Rfl: 1  •  b complex vitamins capsule, Take 1 capsule by mouth daily (Patient not taking: Reported on 5/24/2023), Disp: , Rfl:   •  Vitamin D, Ergocalciferol, 2000 units CAPS, Take 2,000 Units by mouth daily  (Patient not taking: Reported on 6/6/2023), Disp: , Rfl:           • Whom besides the patient is providing clinical information about today's encounter?   o NO ADDITIONAL HISTORIAN (patient alone provided history)    Physical Exam and Assessment/Plan by Diagnosis:    1  SEBORRHEIC KERATOSES  - Relevant exam: Scattered over the trunk/extremities are waxy brown to black plaques and papules with stuck on appearance  - Exam and clinical history consistent with seborrheic keratoses  - Counseled that these are benign growths that do not require treatment  - Counseled that removal of lesions is considered cosmetic and so would incur a fee should patient elect to move forward  - Patient to hold on treatments for now but will inform us should they desire additional treatments    2  MELANOCYTIC NEVI  -Relevant exam: Scattered over the trunk/extremities are homogenously pigmented brown macules and papules  ELM performed and without concerning findings  - Exam and clinical history consistent with melanocytic nevi  - Educated on the ABCDE's of melanoma; handout provided  - Counseled to return to clinic prior to scheduled appointment should any of these lesions change or should any new lesions of concern arise  - Counseled on use of sun protection daily   Reviewed latest FDA sunscreen guidelines, including use of broad spectrum (UVA and UVB blocking) sunscreen or sun protective clothing with SPF 30-50 every 2-3 hours and reapplied after exposure to water; use of photoprotective clothing, including a broad brim hat and UPF rated clothing if outdoors for several hours; avoid use of tanning beds as these pose significant risk for melanoma and skin cancer  3 LENTIGINES  OTHER SKIN CHANGES DUE TO CHRONIC EXPOSURE TO NONIONIZING RADIATION  - Relevant exam: Over sun exposed areas are brown macules  ELM performed and without concerning findings  - Exam and clinical history consistent with lentigines  - Educated that these are indicative of prior sun exposure  - Counseled to return to clinic prior to scheduled appointment should any of these lesions change or should any new lesions of concern arise   - Recommended use of sunscreen as above and below  - Counseled on use of sun protection daily  Reviewed latest FDA sunscreen guidelines, including use of broad spectrum (UVA and UVB blocking) sunscreen or sun protective clothing with SPF 30-50 every 2-3 hours and reapplied after exposure to water; use of photoprotective clothing, including a broad brim hat and UPF rated clothing if outdoors for several hours; avoid use of tanning beds as these pose significant risk for melanoma and skin cancer  4 CHERRY ANGIOMAS  - Relevant exam: Scattered over the trunk/extremities are red papules  - Exam and clinical history consistent with cherry angiomas  - Educated that these are benign  - Educated that removal is considered aesthetic and would incur a fee  - Patient does not wish to pursue removal at this time but will contact us should this change  5 ACTINIC KERATOSES  - Relevant exam: On the Forehead and nose are gritty pink papules  - Exam and clinical history consistent with actinic keratoses  - Discussed that these lesions are considered premalignant with the potential to evolve into squamous cell carcinoma     - Discussed that these are due to chronic sun exposure and therefore recommend use of sunscreen/sun protection to prevent further sun damage  - Discussed treatment options, including liquid nitrogen destruction, topical immunotherapy, and photodynamic therapy, including risks, benefits    OPTION 3:     PROCEDURE:  DESTRUCTION OF PRE-MALIGNANT LESIONS    - After a thorough discussion of treatment options and risk/benefits/alternatives (including but not limited to local pain, scarring, dyspigmentation, blistering, and possible superinfection), verbal and written consent were obtained and the aforementioned lesions were treated on with cryotherapy using liquid nitrogen x 1 cycle for 5-10 seconds  • TOTAL NUMBER of 2 pre-malignant lesions were treated today on the ANATOMIC LOCATION: Forehead and nose  The patient tolerated the procedure well, and after-care instructions were provided  6 HISTORY OF NMSC (SCCIS, BCC)  - Relevant exam: Areas of prior NMSC treatment without evidence of recurrence  - Counseled on use of sun protection daily  Reviewed latest FDA sunscreen guidelines, including use of broad spectrum (UVA and UVB blocking) sunscreen or sun protective clothing with SPF 30-50 every 2-3 hours and reapplied after exposure to water; use of photoprotective clothing, including a broad brim hat and UPF rated clothing if outdoors for several hours; avoid use of tanning beds as these pose significant risk for melanoma and skin cancer   - Counseled to return to clinic prior to scheduled appointment should any of these areas change or should any new lesions of concern arise    NEOPLASM OF UNCERTAIN BEHAVIOR OF SKIN    Physical Exam:  • Anatomic Location Affected:  ; Left chest 0 7 cm x 0 5 cm pink plaque with white shiny structures;  Differential diagnosis; Rule out Basal cell carcinoma  • Morphological Description:0 7 cm x 0 5 cm pink plaque with white shiny structures;   •   •   •       Additional History of Present Condition:    - Duration: Discovered on exam  - Always present/intermittently present:Un known  - Symptoms:Denies  - Changing?:Unknown    - Educated patient that given history and physical exam, recommendation is to biopsy "lesion for definitive diagnosis  However, educated that in rare instances, we cannot determine the exact diagnosis after biopsy  PROCEDURE TANGENTIAL (SHAVE) BIOPSY NOTE:    • Performing Physician: Dr Murphy  • Anatomic Location; Clinical Description with size (cm); Pre-Op Diagnosis:   o Specimen A; Left chest; 0 7 cm x 0 5 cm pink plaque with shiny white structures; Differential diagnosis; Rule out basal cell carcinoma  • Post-op diagnosis: Same     • Local anesthesia: 1% Lidocaine HCL     • Topical anesthesia: None    • Hemostasis: Aluminum chloride       After obtaining informed consent  at which time there was a discussion about the purpose of biopsy  and low risks of infection and bleeding  The area was prepped and draped in the usual fashion  Anesthesia was obtained with 1% lidocaine with epinephrine  A shave biopsy to an appropriate sampling depth was obtained by Shave (Dermablade or 15 blade) The resulting wound was covered with surgical ointment and bandaged appropriately  The patient tolerated the procedure well without complications and was without signs of functional compromise  Specimen has been sent for review by Dermatopathology  Standard post-procedure care has been explained and has been included in written form within the patient's copy of Informed Consent  INFORMED CONSENT DISCUSSION AND POST-OPERATIVE INSTRUCTIONS FOR PATIENT    I   RATIONALE FOR PROCEDURE  I understand that a skin biopsy allows the Dermatologist to test a lesion or rash under the microscope to obtain a diagnosis  It usually involves numbing the area with numbing medication and removing a small piece of skin; sometimes the area will be closed with sutures  In this specific procedure, sutures are not usually needed  If any sutures are placed, then they are usually need to be removed in 2 weeks or less  I understand that my Dermatologist recommends that a skin \"shave\" biopsy be performed today    A local " "anesthetic, similar to the kind that a dentist uses when filling a cavity, will be injected with a very small needle into the skin area to be sampled  The injected skin and tissue underneath \"will go to sleep” and become numb so no pain should be felt afterwards  An instrument shaped like a tiny \"razor blade\" (shave biopsy instrument) will be used to cut a small piece of tissue and skin from the area so that a sample of tissue can be taken and examined more closely under the microscope  A slight amount of bleeding will occur, but it will be stopped with direct pressure and a pressure bandage and any other appropriate methods  I understands that a scar will form where the wound was created  Surgical ointment will be applied to help protect the wound  Sutures are not usually needed  II   RISKS AND POTENTIAL COMPLICATIONS   I understand the risks and potential complications of a skin biopsy include but are not limited to the following:  • Bleeding  • Infection  • Pain  • Scar/keloid  • Skin discoloration  • Incomplete Removal  • Recurrence  • Nerve Damage/Numbness/Loss of Function  • Allergic Reaction to Anesthesia  • Biopsies are diagnostic procedures and based on findings additional treatment or evaluation may be required  • Loss or destruction of specimen resulting in no additional findings    My Dermatologist has explained to me the nature of the condition, the nature of the procedure, and the benefits to be reasonably expected compared with alternative approaches  My Dermatologist has discussed the likelihood of major risks or complications of this procedure including the specific risks listed above, such as bleeding, infection, and scarring/keloid  I understand that a scar is expected after this procedure  I understand that my physician cannot predict if the scar will form a \"keloid,\" which extends beyond the borders of the wound that is created  A keloid is a thick, painful, and bumpy scar    A " "keloid can be difficult to treat, as it does not always respond well to therapy, which includes injecting cortisone directly into the keloid every few weeks  While this usually reduces the pain and size of the scar, it does not eliminate it  I understand that photographs may be taken before and after the procedure  These will be maintained as part of the medical providers confidential records and may not be made available to me  I further authorize the medical provider to use the photographs for teaching purposes or to illustrate scientific papers, books, or lectures if in his/her judgment, medical research, education, or science may benefit from its use  I have had an opportunity to fully inquire about the risks and benefits of this procedure and its alternatives  I have been given ample time and opportunity to ask questions and to seek a second opinion if I wished to do so  I acknowledge that there have specifically been no guarantees as to the cosmetic results from the procedure  I am aware that with any procedure there is always the possibility of an unexpected complication  III  POST-PROCEDURAL CARE (WHAT YOU WILL NEED TO DO \"AFTER THE BIOPSY\" TO OPTIMIZE HEALING)    • Keep the area clean and dry  Try NOT to remove the bandage or get it wet for the first 24 hours  • Gently clean the area and apply surgical ointment (such as Vaseline petrolatum ointment, which is available \"over the counter\" and not a prescription) to the biopsy site for up to 2 weeks straight  This acts to protect the wound from the outside world  • Sutures are not usually placed in this procedure  If any sutures were placed, return for suture removal as instructed (generally 1 week for the face, 2 weeks for the body)  • Take Acetaminophen (Tylenol) for discomfort, if no contraindications  Ibuprofen or aspirin could make bleeding worse      • Call our office immediately for signs of infection: fever, chills, " increased redness, warmth, tenderness, discomfort/pain, or pus or foul smell coming from the wound  WHAT TO DO IF THERE IS ANY BLEEDING? If a small amount of bleeding is noticed, place a clean cloth over the area and apply firm pressure for ten minutes  Check the wound after 10 minutes of direct pressure  If bleeding persists, try one more time for an additional 10 minutes of direct pressure on the area  If the bleeding becomes heavier or does not stop after the second attempt, or if you have any other questions about this procedure, then please call your [facility] Dermatologist by calling [phone] (SKIN)         Scribe Attestation    I,:  Kimberly Kaiser MA am acting as a scribe while in the presence of the attending physician :       I,:  Reji Mcarthur MD personally performed the services described in this documentation    as scribed in my presence :

## 2023-07-05 ENCOUNTER — APPOINTMENT (OUTPATIENT)
Dept: LAB | Facility: AMBULARY SURGERY CENTER | Age: 73
End: 2023-07-05
Payer: MEDICARE

## 2023-07-05 ENCOUNTER — TELEPHONE (OUTPATIENT)
Dept: NEPHROLOGY | Facility: CLINIC | Age: 73
End: 2023-07-05

## 2023-07-05 DIAGNOSIS — D63.1 ANEMIA IN STAGE 4 CHRONIC KIDNEY DISEASE (HCC): ICD-10-CM

## 2023-07-05 DIAGNOSIS — R80.1 PERSISTENT PROTEINURIA: ICD-10-CM

## 2023-07-05 DIAGNOSIS — E55.9 VITAMIN D DEFICIENCY: ICD-10-CM

## 2023-07-05 DIAGNOSIS — N18.4 ANEMIA IN STAGE 4 CHRONIC KIDNEY DISEASE (HCC): ICD-10-CM

## 2023-07-05 DIAGNOSIS — I12.9 HYPERTENSIVE CHRONIC KIDNEY DISEASE WITH STAGE 1 THROUGH STAGE 4 CHRONIC KIDNEY DISEASE, OR UNSPECIFIED CHRONIC KIDNEY DISEASE: ICD-10-CM

## 2023-07-05 DIAGNOSIS — E11.21 DIABETIC NEPHROPATHY ASSOCIATED WITH TYPE 2 DIABETES MELLITUS (HCC): ICD-10-CM

## 2023-07-05 DIAGNOSIS — N18.4 STAGE 4 CHRONIC KIDNEY DISEASE (HCC): ICD-10-CM

## 2023-07-05 DIAGNOSIS — E78.2 MIXED HYPERLIPIDEMIA: ICD-10-CM

## 2023-07-05 LAB
ANION GAP SERPL CALCULATED.3IONS-SCNC: -1 MMOL/L
BUN SERPL-MCNC: 27 MG/DL (ref 5–25)
CALCIUM SERPL-MCNC: 9 MG/DL (ref 8.3–10.1)
CHLORIDE SERPL-SCNC: 106 MMOL/L (ref 96–108)
CO2 SERPL-SCNC: 30 MMOL/L (ref 21–32)
CREAT SERPL-MCNC: 2.34 MG/DL (ref 0.6–1.3)
FERRITIN SERPL-MCNC: 7 NG/ML (ref 11–307)
GFR SERPL CREATININE-BSD FRML MDRD: 19 ML/MIN/1.73SQ M
GLUCOSE SERPL-MCNC: 105 MG/DL (ref 65–140)
IRON SATN MFR SERPL: 8 % (ref 15–50)
IRON SERPL-MCNC: 33 UG/DL (ref 50–170)
POTASSIUM SERPL-SCNC: 4.3 MMOL/L (ref 3.5–5.3)
SODIUM SERPL-SCNC: 135 MMOL/L (ref 135–147)
TIBC SERPL-MCNC: 426 UG/DL (ref 250–450)

## 2023-07-05 PROCEDURE — 83540 ASSAY OF IRON: CPT

## 2023-07-05 PROCEDURE — 82728 ASSAY OF FERRITIN: CPT

## 2023-07-05 PROCEDURE — 83550 IRON BINDING TEST: CPT

## 2023-07-05 PROCEDURE — 80048 BASIC METABOLIC PNL TOTAL CA: CPT

## 2023-07-05 PROCEDURE — 36415 COLL VENOUS BLD VENIPUNCTURE: CPT

## 2023-07-05 NOTE — TELEPHONE ENCOUNTER
Spoke with patient about the following, she expressed understanding and said anytime she tries oral iron she becomes very constipated and it is difficult for her to relieve it:    ----- Message from Mike Christianson MD sent at 7/5/2023 12:20 PM EDT -----  Kidney numbers are slightly higher that is the creatinine reflecting the spironolactone however I would not change this because it is helping her protein in the urine  Iron studies are low    Recommend:  1. Please make sure she is taking iron 3 days a week over-the-counter  2.   Repeat a basic metabolic profile in about 2 to 3 weeks to make sure kidney function is stable, also have her go for fecal immunochemical testing at that time  Thank you  ----- Message -----  From: Lab, Background User  Sent: 7/5/2023  11:58 AM EDT  To: Miek Christianson MD

## 2023-07-06 NOTE — TELEPHONE ENCOUNTER
Spoke with patient about the following, she expressed understanding and thanked us for the call:      She can try a stool softener and just take iron twice a week perhaps a different kind of formulation than she has had in the past   If she cannot and she gets severely constipated do not worry about it he can avoid it then at that juncture always give her intravenous iron as needed in the future

## 2023-07-13 PROCEDURE — 88305 TISSUE EXAM BY PATHOLOGIST: CPT | Performed by: PATHOLOGY

## 2023-07-13 PROCEDURE — 88342 IMHCHEM/IMCYTCHM 1ST ANTB: CPT | Performed by: PATHOLOGY

## 2023-07-15 NOTE — CONSULTS
Patient Name: Peter Beauchamp YOB: 1950    Medical Record No : [id number]     Admit/Registration Date: 3/22/2023  2:58 PM  Date of Consult: 03/22/23      Oral and Maxillofacial Surgery Consult Note    Assessment:  67 y o  female with nasal bone and right orbital floor fracture  Eye exam limited by right periorbital edema though pupils are round and reactive  Plan/Recs:  - appreciate optho recs   - no emergent OMS intervention indicated at this time though the patient will require re-examination as outpatient when swelling has subsided  - SINUS precautions, with nasal decongestants prn   - HOB elevated  - ice to face prn    - Follow up Monday 3/26/23 with [address]  Call [phone] for an appointment         -----------------------------------------    Chief Complaint:  Eye swelling     HPI:  67 y o  female with DMT2, COPD, HTN, HLD, CKD 4, GERD, depression who presents with s/p trip and fall to concrete  Pt reports walking her dog with her  when the dog pulled her off balance while stepping off a stair resulting in her falling to her face  Pt reports pain 3/10, manages with pain medication  PT denies changes to bite, LOC, swelling, and limited vision in right eye due to swelling though vision intact with assisted lid retraction  HDS  No N/V/F/C  Pre-admission records reviewed  PMH/PSH/Meds/Allergies Reviewed      Past Medical History:   Diagnosis Date   • Allergic    • Allergic rhinitis    • Anesthesia     pt reports "had to use double lumen endo tube for hiatal hernia repair/so surgeon could get to where he needed to work"   • Arthritis    • Aspiration into airway    • Basal cell carcinoma 2007    left cheek    • BCC (basal cell carcinoma) 05/27/2021    Left Nasal tip   • Cancer (HCC)     squamous cell cancer on forhead   • Cancer ([facility] [address] )     basal cell on nose    • Chronic kidney disease 2000, 2018    Stones, kidney disease stage 4   • Chronic pain disorder     bilat feet and joint pain on occas   • Colon polyp    • Constipation    • COPD (chronic obstructive pulmonary disease) (HCC)    • COVID-19 08/2021    recovered at home/did receive monoclonal infusion   • Dental bridge present    • Depression    • Diabetes mellitus ([facility] [address] )     Type 2   • Diabetic neuropathy ([facility] )    • Disease of thyroid gland    • Family history of thyroid problem    • Fatty liver    • GERD (gastroesophageal reflux disease)    • Heart burn    • Hiatal hernia    • History of pneumonia    • Hyperlipidemia    • Hyperplasia, parathyroid ([facility] )    • Hypertension    • Kidney problem    • Kidney stone    • Memory loss Julu2 2020   • Motion sickness    • Motion sickness    • Neck pain    • Obesity 1978   • Obesity (BMI 30 0-34  9)    • Pollen allergies    • RLS (restless legs syndrome)    • SCC (squamous cell carcinoma) 05/04/2021    left mid forehead   • Seasonal allergies    • Sleep apnea     has inspire   • Squamous cell skin cancer 2007    left cheek    • Swollen ankles    • 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, left     great toe fusion   • Urinary incontinence    • Urinary tract infection 03/28/2022   • Wears glasses      Past Surgical History:   Procedure Laterality Date   • ARTHROSCOPY KNEE     • BREAST BIOPSY      stereotactic-benign   • BREAST BIOPSY      stereo-benign   • BREAST EXCISIONAL BIOPSY      unknown date-benign   • BREAST EXCISIONAL BIOPSY      unknown date-benign   • BREAST EXCISIONAL BIOPSY      unknown date-benign   • BREAST EXCISIONAL BIOPSY      unknown age-benign   • CARDIAC CATHETERIZATION     • CHOLECYSTECTOMY     • COLONOSCOPY     • EXAMINATION UNDER ANESTHESIA N/A 06/24/2021    Procedure: EXAM UNDER ANESTHESIA (EUA), DISE;  Surgeon: Jatin Jj MD;  [facility]: [facility] MAIN OR;  Service: ENT   • HERNIA REPAIR     • HIATAL HERNIA REPAIR     • KNEE SURGERY      Torn maniscus lap surg   • LIPOSUCTION     • LYMPH NODE BIOPSY     • MOHS SURGERY  05/20/2021    left mid forehead-Evan   • MOHS SURGERY Left 2021    Left nasal tip- gautam    • CO LIGATION/BIOPSY TEMPORAL ARTERY Left 2023    Procedure: BIOPSY ARTERY TEMPORAL;  Surgeon: Vlad Davey DO;  Location: [facility] Main OR;  Service: Vascular   • CO OPEN IMPLANTATION CRANIAL NERVE VASQUEZ & PULSE GEN N/A 11/10/2021    Procedure: INSERTION UPPER AIRWAY STIMULATOR, INSPIRE IMPLANT;  Surgeon: Priscilla Montoya MD;  Location: [facility] Main OR;  Service: ENT   • CO UNLISTED PROCEDURE FOOT/TOES Right 2022    Procedure: 1st metatarsal phalangeal joint fusion;  Surgeon: Gregg Richter DPM;  Location: [facility];  Service: Podiatry   • REDUCTION MAMMAPLASTY Bilateral    • REDUCTION MAMMAPLASTY     • SKIN BIOPSY     • SKIN CANCER EXCISION  2007    squamous cell carcinoma    • SKIN CANCER EXCISION  2007    basal cell carcinoma   • SQUAMOUS CELL CARCINOMA EXCISION     • TOE SURGERY Right 2022    Right Great Toe Fusion   • TONSILLECTOMY     • TUBAL LIGATION     • UPPER GASTROINTESTINAL ENDOSCOPY     • US GUIDED BREAST BIOPSY RIGHT COMPLETE Right 2022       Allergies   Allergen Reactions   • Sulfamethoxazole-Trimethoprim Other (See Comments)     Tongue swelling   • Ciprofloxacin Hives and Itching       Social History     Socioeconomic History   • Marital status: /Civil Union     Spouse name: Not on file   • Number of children: Not on file   • Years of education: Not on file   • Highest education level: Not on file   Occupational History   • Occupation: RETIRED   Tobacco Use   • Smoking status: Former     Packs/day: 2 00     Years: 10 00     Pack years: 20 00     Types: Cigarettes     Start date: 1968     Quit date: 1976     Years since quittin 2   • Smokeless tobacco: Never   • Tobacco comments:     Smoked 2 pack a day   Vaping Use   • Vaping Use: Never used   Substance and Sexual Activity   • Alcohol use: Yes     Comment: 1 or 2 a year   • Drug use: No   • Sexual activity: Not Currently     Partners: Male     Birth control/protection: Abstinence, Post-menopausal, Female Sterilization     Comment: defer   Other Topics Concern   • Not on file   Social History Narrative   • Not on file     Social Determinants of Health     Financial Resource Strain: Not on file   Food Insecurity: No Food Insecurity   • Worried About Running Out of Food in the Last Year: Never true   • Ran Out of Food in the Last Year: Never true   Transportation Needs: No Transportation Needs   • Lack of Transportation (Medical): No   • Lack of Transportation (Non-Medical):  No   Physical Activity: Not on file   Stress: Not on file   Social Connections: Not on file   Intimate Partner Violence: Not on file   Housing Stability: Unknown   • Unable to Pay for Housing in the Last Year: No   • Number of Places Lived in the Last Year: Not on file   • Unstable Housing in the Last Year: No       Scheduled Medications  Current Facility-Administered Medications   Medication Dose Route Frequency Provider Last Rate   • acetaminophen  650 mg Oral Q4H PRN Chance Abraham Nearing, DO     • albuterol  2 puff Inhalation Q6H PRN Chance Abraham Nearing, DO     • [START ON 3/23/2023] amLODIPine  5 mg Oral Daily Chance Abraham Nearing, DO     • ampicillin-sulbactam  3 g Intravenous Once Chance Abraham Nearing, DO     • [START ON 3/23/2023] calcium carbonate  2 tablet Oral Daily With Breakfast Chance Abraham Nearing, DO     • [START ON 3/23/2023] DULoxetine  60 mg Oral Daily Chance Abraham Nearing, DO     • enoxaparin  30 mg Subcutaneous Q12H Chance Abraham Nearing, DO     • gabapentin  100 mg Oral HS Chance Abraham Nearing, DO     • HYDROmorphone  0 2 mg Intravenous Q4H PRN Chance Abraham Nearing, DO     • insulin detemir  50 Units Subcutaneous QPM Chance Abraham Nearing, DO     • insulin lispro  16 Units Subcutaneous TID With Meals Chance Abraham Nearing, DO     • [START ON 3/23/2023] levothyroxine  112 mcg Oral Early Morning Chance Abraham Nearing, DO     • [START ON 3/23/2023] losartan  25 mg Oral Daily Chance Mary Ann Crimes, DO     • metoprolol tartrate  50 mg Oral Q12H [facility] Chance Mary Ann Crimes, DO     • [START ON 3/23/2023] multivitamin stress formula  1 tablet Oral Daily Chance Mary Ann Crimes, DO     • ondansetron  4 mg Oral Once Mars Nails, PA-C     • oxyCODONE  5 mg Oral Q4H PRN Chance Mary Ann Crimes, DO     • oxyCODONE  2 5 mg Oral Q4H PRN Chance Mary Ann Crimes, DO     • pantoprazole  40 mg Oral BID Chance Mary Ann Crimes, DO     • pramipexole  0 25 mg Oral HS Chance Mary Ann Crimes, DO     • [START ON 3/23/2023] spironolactone  25 mg Oral Daily Chance Mary Ann Crimes, DO     • [START ON 3/23/2023] torsemide  20 mg Oral Daily Chance Rosalina Krishan Allen, DO         PRN Medications  •  acetaminophen  •  albuterol  •  HYDROmorphone  •  oxyCODONE  •  oxyCODONE    Medication Infusions       Review of Systems    Vitals:    Temp:  [98 °F (36 7 °C)] 98 °F (36 7 °C)  HR:  [75] 75  Resp:  [16] 16  BP: (182)/(86) 182/86  Wt Readings from Last 1 Encounters:   02/13/23 74 8 kg (165 lb)     Ht Readings from Last 1 Encounters:   02/13/23 5' 2" (1 575 m)     There is no height or weight on file to calculate BMI  Respiratory    Lab Data (Last 4 hours)    None         O2/Vent Data (Last 4 hours)    None              Patient Lines/Drains/Airways Status     Active Airway     None              I/O:  Current Diet Order:        Diet Orders   (From admission, onward)             Start     Ordered    03/23/23 0000  Diet NPO  Diet effective now        References:    Nutrtion Support Algorithm Enteral vs  Parenteral   Question Answer Comment   Diet Type NPO    RD to adjust diet per protocol?  Yes        03/22/23 1732    03/22/23 1727  Diet Regular; Regular House  Diet effective now        Comments: NPO at midnight   References:    Nutrtion Support Algorithm Enteral vs  Parenteral   Question Answer Comment   Diet Type Regular    Regular Regular House    RD to adjust diet per protocol? Yes        03/22/23 1730                 Intake/Output Summary (Last 24 hours) at 3/22/2023 1803  Last data filed at 3/22/2023 1704  Gross per 24 hour   Intake --   Output 600 ml   Net -600 ml       Labs: Invalid input(s): LABGLOM          Pain Management Panel     Pain Management Panel Latest Ref Rng & Units 2/9/2023 11/4/2022    CREATININE UR mg/dL 80 5 58 1          Cultures and Sensitivites:    Imaging:   CT: facial bones   IMPRESSION:    Prominently displaced fracture of the inferior wall of the right orbit without intraorbital muscular entrapment  Acute mildly displaced bilateral nasal bone fractures and increased leftward nasal septal deviation  Right maxillary sinus hemorrhage  Physical Exam:   General: Integment: skin warm and dry, patient is WD/WN, Voice quality: normal, in NAD  Neuro Exam: AAO x 3, CN V,VII grossly intact, GCS: 15  Head: Normocephalic, no scalp lacerations or hematoma   Face: three superficial lacerations over the lower frontal, nasal and right orbital region that are hemostatic  No bony Step Offs   Ears: Pinna wnl bilaterally, no otorrhea, hearing grossly intact   Eyes/Periorbital: Severe right periorbital edema and ecchymosis (mild on the left) Right eye swollen shut  , Pupils equal, round, reactive to light ( when right lid is retracted) Extraocular movements limited by periobital edema intact, intercanthal distance wnl  Nose: lacerations as above  Nasal dorsum is ttp with no depression or deviation   no nasal crepitus, no nasal septal hematoma, no rhinorrhea, dried heme in the nares bilaterally    Oral Exam:Lips and mucosal surfaces wnl, floor of mouth is soft with no palpable masses, tongue protrusion is midline and has full range of motion, no pharyngeal edema or exudate   Dentalalveolar Exam: Normal dentition, atraumatic and occlusion stable, DELPHINE 40mm, lateral excursive movements wnl  Lymph/Neck Exam: Neck is soft, trachea is midline, no gross cervical lymphadenopathy bilaterally       Sara Pires, DMD You can access the FollowMyHealth Patient Portal offered by [facility] by registering at the following website: [email or web address]. By joining [employer]’s FollowMyHealth portal, you will also be able to view your health information using other applications (apps) compatible with our system.

## 2023-07-18 ENCOUNTER — OFFICE VISIT (OUTPATIENT)
Dept: CARDIOLOGY CLINIC | Facility: CLINIC | Age: 73
End: 2023-07-18
Payer: MEDICARE

## 2023-07-18 VITALS
WEIGHT: 172.2 LBS | SYSTOLIC BLOOD PRESSURE: 120 MMHG | HEART RATE: 63 BPM | DIASTOLIC BLOOD PRESSURE: 60 MMHG | BODY MASS INDEX: 30.51 KG/M2 | HEIGHT: 63 IN

## 2023-07-18 DIAGNOSIS — I10 ESSENTIAL HYPERTENSION: Primary | ICD-10-CM

## 2023-07-18 DIAGNOSIS — E78.2 MIXED HYPERLIPIDEMIA: ICD-10-CM

## 2023-07-18 DIAGNOSIS — R07.89 OTHER CHEST PAIN: ICD-10-CM

## 2023-07-18 DIAGNOSIS — I27.20 PULMONARY HYPERTENSION (HCC): ICD-10-CM

## 2023-07-18 DIAGNOSIS — R00.2 PALPITATIONS: ICD-10-CM

## 2023-07-18 PROCEDURE — 99214 OFFICE O/P EST MOD 30 MIN: CPT | Performed by: INTERNAL MEDICINE

## 2023-07-18 NOTE — PROGRESS NOTES
Cardiology Consultation     Jericho Beyer  [id number]  1950  [facility] [address] [address]    1. Essential hypertension        2. Pulmonary hypertension ([address])        3. Other chest pain        4. Mixed hyperlipidemia        5. Palpitations          Chief Complaint   Patient presents with   • Follow-up     6 week f/u    • Palpitations     Random    • Rapid Heart Rate     Occasionally in middle of night    • Dizziness     Occurs with positional changes      HPI:  Patient continues to have some palpitations and lightheadedness, mostly at night, random in occurrence. No reported chest pain, shortness of breath, syncope, LE edema, orthopnea, PND, or significant weight changes. Patient remains active without any increased fatigue out of the ordinary.       Patient Active Problem List   Diagnosis   • Stage 4 chronic kidney disease ([address])   • Hypertensive chronic kidney disease with stage 1 through stage 4 chronic kidney disease, or unspecified chronic kidney disease   • BARBARA (obstructive sleep apnea)   • RLS (restless legs syndrome)   • Persistent proteinuria   • Insulin dependent type 2 diabetes mellitus ([address])   • Pulmonary hypertension ([address])   • History of repair of hiatal hernia   • Hyperlipidemia   • Vitamin D deficiency   • Fibromyalgia   • Type 2 diabetes mellitus with diabetic chronic kidney disease (HCC)   • Leukocytosis   • History of kidney stones   • Essential hypertension   • Ambulatory dysfunction   • Memory changes   • Gastroparesis diabeticorum (HCC)   • B12 neuropathy (HCC)   • Depression, recurrent (HCC)   • Epigastric abdominal pain with severe diabetic gastroparesis, status post EGD/Botox injection, 5/14   • COPD (chronic obstructive pulmonary disease) ([facility])   • Chronic constipation   • History of colon polyps   • Other chest pain   • Urinary retention   • S/P foot surgery   • Urinary incontinence   • Hypothyroid   • 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   • Fracture of orbital floor, blow-out, right, closed, with routine healing, subsequent encounter   • Nasal bones, closed fracture   • Facial abrasion, initial encounter   • Multiple closed fractures of ribs of right side   • Seasonal allergies   • Elevated liver enzymes   • Hyponatremia   • Osteoporosis   • Diabetic nephropathy associated with type 2 diabetes mellitus ([address])   • Breast pain   • Breast discharge   • Anemia in stage 4 chronic kidney disease ([address])   • Palpitations     Past Medical History:   Diagnosis Date   • Actinic keratosis    • Acute cystitis without hematuria 04/28/2023   • Acute-on-chronic kidney injury [facility])    • ANDRA (acute kidney injury) ([address]) 05/08/2020   • Allergic    • Allergic rhinitis    • Anesthesia     pt reports "had to use double lumen endo tube for hiatal hernia repair/so surgeon could get to where he needed to work"   • Arthritis    • Aspiration into airway    • Ahumada esophagus 1993    Lot of stress with children   • Basal cell carcinoma 2007    left cheek    • BCC (basal cell carcinoma) 05/27/2021    Left Nasal tip   • Cancer ([address])     squamous cell cancer on forhead   • Cancer ([address])     basal cell on nose    • Cholelithiasis    • Chronic kidney disease 2000, 2018    Stones, kidney disease stage 4   • Chronic pain disorder     bilat feet and joint pain on occas   • Colon polyp    • Constipation    • COPD (chronic obstructive pulmonary disease) ([address])    • COVID-19 08/2021    recovered at home/did receive monoclonal infusion   • COVID-19 virus infection 08/16/2021   • Dental bridge present    • Depression    • Diabetes mellitus ([address])     Type 2   • Diabetic neuropathy ([address])    • Disease of thyroid gland    • Dyspnea    • Fall 03/22/2023   • Family history of thyroid problem    • Fatty liver    • GERD (gastroesophageal reflux disease)    • Heart burn    • Hiatal hernia    • History of pneumonia    • Hyperlipidemia    • Hyperplasia, parathyroid ([address])    • Hypertension    • Hypotension 2022   • Kidney problem    • Kidney stone    • Memory loss 2020   • Motion sickness    • Motion sickness    • Neck pain    • Obesity    • Obesity (BMI 30.0-34. 9)    • Pollen allergies    • RLS (restless legs syndrome)    • SCC (squamous cell carcinoma) 2021    left mid forehead   • Seasonal allergies    • Sleep apnea     has inspire   • Squamous cell skin cancer     left cheek    • Swollen ankles    • 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, left     great toe fusion   • Urinary incontinence    • Urinary tract infection 2022   • Wears glasses      Social History     Socioeconomic History   • Marital status: /Civil Union     Spouse name: Not on file   • Number of children: Not on file   • Years of education: Not on file   • Highest education level: Not on file   Occupational History   • Occupation: RETIRED   Tobacco Use   • Smoking status: Former     Packs/day: 2.00     Years: 10.00     Total pack years: 20.00     Types: Cigarettes     Start date: 1968     Quit date: 1976     Years since quittin.5     Passive exposure: Past   • Smokeless tobacco: Never   • Tobacco comments:     Smoked 2 pack a day   Vaping Use   • Vaping Use: Never used   Substance and Sexual Activity   • Alcohol use: Yes     Comment: 1 or 2 a year   • Drug use: No   • Sexual activity: Not Currently     Partners: Male     Birth control/protection: Female Sterilization     Comment: defer   Other Topics Concern   • Not on file   Social History Narrative   • Not on file     Social Determinants of Health     Financial Resource Strain: Low Risk  (2023)    Overall Financial Resource Strain (CARDIA)    • Difficulty of Paying Living Expenses: Not hard at all   Food Insecurity: No Food Insecurity (3/27/2023)    Hunger Vital Sign    • Worried About Running Out of Food in the Last Year: Never true    • Ran Out of Food in the Last Year: Never true   Transportation Needs: No Transportation Needs (5/24/2023)    PRAPARE - Transportation    • Lack of Transportation (Medical): No    • Lack of Transportation (Non-Medical):  No   Physical Activity: Not on file   Stress: Not on file   Social Connections: Not on file   Intimate Partner Violence: Not on file   Housing Stability: Unknown (3/27/2023)    Housing Stability Vital Sign    • Unable to Pay for Housing in the Last Year: No    • Number of Places Lived in the Last Year: Not on file    • Unstable Housing in the Last Year: No      Family History   Problem Relation Age of Onset   • Heart disease Mother    • Depression Mother    • Hypertension Mother    • COPD Mother    • Hearing loss Mother    • Anxiety disorder Mother    • Heart disease Father    • Lung cancer Father 79        Smoker    • Cancer Father         brain   • Alcohol abuse Father    • Dementia Father    • Hypertension Father    • Thyroid disease Father    • COPD Father    • Arthritis Father    • Brain cancer Father 76   • Hypertension Sister    • Diabetes Sister    • Heart disease Sister    • Thyroid disease Sister    • Cancer Sister         Lympoma, lung   • Lung cancer Sister 78   • Anxiety disorder Sister    • Hypertension Brother    • Diabetes Brother    • Cancer Brother         Throat   • Dementia Brother    • Stroke Brother    • Hypertension Brother    • Heart disease Brother    • Diabetes Brother    • Hypertension Brother    • No Known Problems Son    • No Known Problems Son    • Brain cancer Paternal Aunt         unknown age   • Diabetes Sister    • Hypertension Sister    • Diabetes Brother    • Breast cancer Neg Hx      Past Surgical History:   Procedure Laterality Date   • ABDOMINAL SURGERY  [id number]    Nissen x2 1972 tubal ligarion   • ARTHROSCOPY KNEE     • BREAST BIOPSY      stereotactic-benign   • BREAST BIOPSY      stereo-benign   • BREAST EXCISIONAL BIOPSY      unknown date-benign   • BREAST EXCISIONAL BIOPSY      unknown date-benign   • BREAST EXCISIONAL BIOPSY      unknown date-benign   • BREAST EXCISIONAL BIOPSY      unknown age-benign   • CARDIAC CATHETERIZATION     • CHOLECYSTECTOMY     • COLONOSCOPY     • EXAMINATION UNDER ANESTHESIA N/A 06/24/2021    Procedure: EXAM UNDER ANESTHESIA (EUA), DISE;  Surgeon: Minerva Ku MD;  [facility];  Service: ENT   • HERNIA REPAIR     • HIATAL HERNIA REPAIR     • KNEE SURGERY      Torn maniscus lap surg   • LIPOSUCTION     • LYMPH NODE BIOPSY     • MOHS SURGERY  05/20/2021    left mid forehead-Gautam   • MOHS SURGERY Left 05/27/2021    Left nasal tip- gautam    • NC LIGATION/BIOPSY TEMPORAL ARTERY Left 01/05/2023    Procedure: BIOPSY ARTERY TEMPORAL;  Surgeon: Bridget Hyde DO;  [facility];  Service: Vascular   • NC OPEN IMPLANTATION CRANIAL NERVE VASQUEZ & PULSE GEN N/A 11/10/2021    Procedure: INSERTION UPPER AIRWAY STIMULATOR, INSPIRE IMPLANT;  Surgeon: Minerva Ku MD;  [facility];  Service: ENT   • NC UNLISTED PROCEDURE FOOT/TOES Right 07/19/2022    Procedure: 1st metatarsal phalangeal joint fusion;  Surgeon: Madiha Upton DPM;  [facility];  Service: Podiatry   • REDUCTION MAMMAPLASTY Bilateral 2000   • REDUCTION MAMMAPLASTY     • SKIN BIOPSY     • SKIN CANCER EXCISION  2007    squamous cell carcinoma    • SKIN CANCER EXCISION  2007    basal cell carcinoma   • SQUAMOUS CELL CARCINOMA EXCISION     • TOE SURGERY Right 08/04/2022    Right Great Toe Fusion   • TONSILLECTOMY     • TUBAL LIGATION     • UPPER GASTROINTESTINAL ENDOSCOPY     • US GUIDED BREAST BIOPSY RIGHT COMPLETE Right 07/18/2022       Current Outpatient Medications:   •  acetaminophen (TYLENOL) 325 mg tablet, Take 3 tablets (975 mg total) by mouth every 8 (eight) hours, Disp: , Rfl: 0  •  albuterol (Ventolin HFA) 90 mcg/act inhaler, Inhale 2 puffs every 6 (six) hours as needed for wheezing, Disp: 54 g, Rfl: 0  •  amLODIPine (NORVASC) 5 mg tablet, Take 1 tablet (5 mg total) by mouth daily, Disp: 90 tablet, Rfl: 1  •  B-D ULTRAFINE III SHORT PEN 31G X 8 MM MISC, , Disp: , Rfl: 3  •  Calcium Citrate 1040 MG TABS, Take 500 mg by mouth Three times a day, Disp: , Rfl:   •  Coenzyme Q10 (CoQ10) 100 MG CAPS, Take 100 mg by mouth in the morning, Disp: , Rfl:   •  Cranberry 200 MG CAPS, Take 200 capsules by mouth 3 (three) times a day, Disp: , Rfl:   •  DULoxetine (CYMBALTA) 30 mg delayed release capsule, Take 60 mg by mouth daily, Disp: , Rfl:   •  estradiol (ESTRACE VAGINAL) 0.1 mg/g vaginal cream, Apply pea sized amount around urethra and inside vaginal opening 3 times weekly, Disp: 42.5 g, Rfl: 2  •  Icosapent Ethyl 1 g CAPS, Take 1 capsule by mouth 2 (two) times a day, Disp: , Rfl:   •  insulin aspart (NovoLOG) 100 units/mL injection, Inject under the skin 3 (three) times a day before meals 16 units, 16 units, 25 units. , Disp: , Rfl:   •  insulin detemir (Levemir FlexTouch) 100 Units/mL injection pen, Inject 50 Units under the skin every evening , Disp: , Rfl:   •  levothyroxine 112 mcg tablet, Take 112 mcg by mouth every morning, Disp: , Rfl:   •  metoprolol tartrate (LOPRESSOR) 50 mg tablet, Take 1 tablet (50 mg total) by mouth every 12 (twelve) hours, Disp: 180 tablet, Rfl: 3  •  olmesartan (BENICAR) 40 mg tablet, Take 1 tablet (40 mg total) by mouth daily, Disp: 30 tablet, Rfl: 1  •  pantoprazole (PROTONIX) 40 mg tablet, TAKE 1 TABLET BY MOUTH TWICE A DAY, Disp: 180 tablet, Rfl: 2  •  pramipexole (MIRAPEX) 0.25 mg tablet, Take 1 tablet (0.25 mg total) by mouth daily at bedtime, Disp: 90 tablet, Rfl: 2  •  Probiotic Product (PROBIOTIC PO), Take 1 capsule by mouth 2 (two) times a day, Disp: , Rfl:   •  rosuvastatin (CRESTOR) 5 mg tablet, Take 1 tablet (5 mg total) by mouth daily, Disp: 90 tablet, Rfl: 3  •  spironolactone (ALDACTONE) 25 mg tablet, Take 1 tablet (25 mg total) by mouth daily, Disp: 30 tablet, Rfl: 5  •  torsemide (DEMADEX) 10 mg tablet, Take 0.5 tablets (5 mg total) by mouth daily, Disp: 30 tablet, Rfl: 1  •  Vitamin D, Ergocalciferol, 2000 units CAPS, Take 2,000 Units by mouth daily, Disp: , Rfl:   •  b complex vitamins capsule, Take 1 capsule by mouth daily (Patient not taking: Reported on 5/24/2023), Disp: , Rfl:   •  ergocalciferol (ERGOCALCIFEROL) 1.25 MG (74864 UT) capsule, Take 1 capsule (50,000 Units total) by mouth once a week Weekly x 8 weeks then Vit D 2000 iu daily OTC thereafter (Patient not taking: Reported on 7/18/2023), Disp: 8 capsule, Rfl: 0  •  gabapentin (Neurontin) 300 mg capsule, Take 1 capsule (300 mg total) by mouth daily at bedtime, Disp: 90 capsule, Rfl: 0  Allergies   Allergen Reactions   • Sulfamethoxazole-Trimethoprim Other (See Comments)     Tongue swelling   • Ciprofloxacin Hives and Itching     Vitals:    07/18/23 1136   BP: 120/60   BP Location: Left arm   Patient Position: Sitting   Cuff Size: Standard   Pulse: 63   Weight: 78.1 kg (172 lb 3.2 oz)   Height: 5' 3" (1.6 m)       Labs:  Appointment on 07/05/2023   Component Date Value   • Sodium 07/05/2023 135    • Potassium 07/05/2023 4.3    • Chloride 07/05/2023 106    • CO2 07/05/2023 30    • ANION GAP 07/05/2023 -1    • BUN 07/05/2023 27 (H)    • Creatinine 07/05/2023 2.34 (H)    • Glucose 07/05/2023 105    • Calcium 07/05/2023 9.0    • eGFR 07/05/2023 19    • Ferritin 07/05/2023 7 (L)    • Iron Saturation 07/05/2023 8 (L)    • TIBC 07/05/2023 426    • Iron 07/05/2023 33 (L)    Office Visit on 06/29/2023   Component Date Value   • Case Report 06/29/2023                      Value:Surgical Pathology Report                         Case: [id number]                                   Authorizing Provider:  Brooke Demarco MD Collected:           06/29/2023 1356              Ordering Location:     [facility]      Received:            06/29/2023 [address] Pathologist:           Conrad Aleman MD                                                             Specimen:    Skin, Other, A. Left chest                                                                • Final Diagnosis 06/29/2023                      Value: This result contains rich text formatting which cannot be displayed here. • Additional Information 06/29/2023                      Value: This result contains rich text formatting which cannot be displayed here. • Gross Description 06/29/2023                      Value: This result contains rich text formatting which cannot be displayed here. • Clinical Information 06/29/2023                      Value:Specimen A; Left chest; Skin; Shave biopsy; 75year old female with a 0.7 cm x 0.5 cm pink plaque with shiny white structures; Differential diagnosis; Rule out basal cell carcinoma    Attention: Dermpath   Ancillary Orders on 06/23/2023   Component Date Value   • Urine Culture 06/23/2023 >100,000 cfu/ml Lactobacillus species (A)    • Urine Culture 06/23/2023 <10,000 cfu/ml    Hospital Outpatient Visit on 06/14/2023   Component Date Value   • Protocol Name 06/14/2023 Tyrone Ashby    • Time In Exercise Phase 06/14/2023 00:03:00    • MAX.  SYSTOLIC BP 06/92/6078 593    • Max Diastolic Bp 93/60/2130 80    • Max Heart Rate 06/14/2023 79    • Max Predicted Heart Rate 06/14/2023 146    • Reason for Termination 06/14/2023 Protocol Complete    • Test Indication 06/14/2023 CHEST PAIN    • Target Hr Formular 06/14/2023 (220 - Age)*100%    • Chest Pain Statement 06/14/2023 none    Hospital Outpatient Visit on 06/14/2023   Component Date Value   • Baseline HR 06/14/2023 58    • Baseline BP 06/14/2023 154/80    • O2 sat rest 06/14/2023 96    • Stress peak HR 06/14/2023 78    • Post peak BP 06/14/2023 110    • Rate Pressure Product 06/14/2023 8,580.0    • O2 sat peak 06/14/2023 98    • Recovery HR 06/14/2023 69    • Recovery BP 06/14/2023 148/84    • Angina Index 06/14/2023 0    • Rest Nuclear Isotope Dose 06/14/2023 11.00    • Stress Nuclear Isotope D* 06/14/2023 32.10    • Stress/rest perfusion ra* 06/14/2023 1.13    • EF (%) 06/14/2023 63    Appointment on 06/13/2023   Component Date Value   • Sodium 06/13/2023 140    • Potassium 06/13/2023 3.9    • Chloride 06/13/2023 109 (H)    • CO2 06/13/2023 26    • ANION GAP 06/13/2023 5    • BUN 06/13/2023 23    • Creatinine 06/13/2023 1.81 (H)    • Glucose, Fasting 06/13/2023 180 (H)    • Calcium 06/13/2023 8.4    • Corrected Calcium 06/13/2023 9.1    • AST 06/13/2023 21    • ALT 06/13/2023 25    • Alkaline Phosphatase 06/13/2023 53    • Total Protein 06/13/2023 6.6    • Albumin 06/13/2023 3.1 (L)    • Total Bilirubin 06/13/2023 0.33    • eGFR 06/13/2023 27    • Total CK 06/13/2023 60    Admission on 06/03/2023, Discharged on 06/03/2023   Component Date Value   • Ventricular Rate 06/03/2023 96    • Atrial Rate 06/03/2023 96    • AL Interval 06/03/2023 218    • QRSD Interval 06/03/2023 68    • QT Interval 06/03/2023 346    • QTC Interval 06/03/2023 437    • P Axis 06/03/2023 63    • QRS Axis 06/03/2023 -8    • T Wave Axis 06/03/2023 17    • WBC 06/03/2023 9.77    • RBC 06/03/2023 3.90    • Hemoglobin 06/03/2023 11.2 (L)    • Hematocrit 06/03/2023 35.4    • MCV 06/03/2023 91    • MCH 06/03/2023 28.7    • MCHC 06/03/2023 31.6    • RDW 06/03/2023 14.9    • MPV 06/03/2023 10.9    • Platelets 45/72/8133 243    • nRBC 06/03/2023 0    • Neutrophils Relative 06/03/2023 71    • Immat GRANS % 06/03/2023 1    • Lymphocytes Relative 06/03/2023 21    • Monocytes Relative 06/03/2023 5    • Eosinophils Relative 06/03/2023 2    • Basophils Relative 06/03/2023 0    • Neutrophils Absolute 06/03/2023 6.96    • Immature Grans Absolute 06/03/2023 0.06    • Lymphocytes Absolute 06/03/2023 2.01    • Monocytes Absolute 06/03/2023 0.49    • Eosinophils Absolute 06/03/2023 0.21    • Basophils Absolute 06/03/2023 0.04    • Sodium 06/03/2023 137    • Potassium 06/03/2023 3.7    • Chloride 06/03/2023 102    • CO2 06/03/2023 25    • ANION GAP 06/03/2023 10    • BUN 06/03/2023 25    • Creatinine 06/03/2023 2.26 (H)    • Glucose 06/03/2023 214 (H)    • Calcium 06/03/2023 8.4    • AST 06/03/2023 14    • ALT 06/03/2023 10    • Alkaline Phosphatase 06/03/2023 49    • Total Protein 06/03/2023 6.2 (L)    • Albumin 06/03/2023 3.7    • Total Bilirubin 06/03/2023 0.38    • eGFR 06/03/2023 20    • hs TnI 0hr 06/03/2023 5    • hs TnI 2hr 06/03/2023 6    • Delta 2hr hsTnI 06/03/2023 1    Admission on 05/29/2023, Discharged on 05/29/2023   Component Date Value   • Ventricular Rate 05/29/2023 87    • Atrial Rate 05/29/2023 87    • [place] Interval 05/29/2023 210    • QRSD Interval 05/29/2023 82    • QT Interval 05/29/2023 370    • QTC Interval 05/29/2023 445    • P Axis 05/29/2023 72    • QRS Axis 05/29/2023 16    • T Wave Axis 05/29/2023 35    • WBC 05/29/2023 12.87 (H)    • RBC 05/29/2023 4.10    • Hemoglobin 05/29/2023 11.7    • Hematocrit 05/29/2023 37.4    • MCV 05/29/2023 91    • MCH 05/29/2023 28.5    • MCHC 05/29/2023 31.3 (L)    • RDW 05/29/2023 14.6    • MPV 05/29/2023 10.9    • Platelets 03/43/8873 274    • nRBC 05/29/2023 0    • Neutrophils Relative 05/29/2023 72    • Immat GRANS % 05/29/2023 1    • Lymphocytes Relative 05/29/2023 19    • Monocytes Relative 05/29/2023 6    • Eosinophils Relative 05/29/2023 2    • Basophils Relative 05/29/2023 0    • Neutrophils Absolute 05/29/2023 9.25 (H)    • Immature Grans Absolute 05/29/2023 0.17    • Lymphocytes Absolute 05/29/2023 2.40    • Monocytes Absolute 05/29/2023 0.77    • Eosinophils Absolute 05/29/2023 0.23    • Basophils Absolute 05/29/2023 0.05    • Sodium 05/29/2023 141    • Potassium 05/29/2023 3.8    • Chloride 05/29/2023 102    • CO2 05/29/2023 30    • ANION GAP 05/29/2023 9    • BUN 05/29/2023 27 (H)    • Creatinine 05/29/2023 2.00 (H)    • Glucose 05/29/2023 177 (H)    • Calcium 05/29/2023 9.3    • AST 05/29/2023 15 • ALT 05/29/2023 18    • Alkaline Phosphatase 05/29/2023 53    • Total Protein 05/29/2023 6.4    • Albumin 05/29/2023 3.8    • Total Bilirubin 05/29/2023 0.38    • eGFR 05/29/2023 24    • hs TnI 0hr 05/29/2023 7    • hs TnI 2hr 05/29/2023 7    • Delta 2hr hsTnI 05/29/2023 0    Appointment on 05/23/2023   Component Date Value   • Sodium 05/23/2023 132 (L)    • Potassium 05/23/2023 4.4    • Chloride 05/23/2023 102    • CO2 05/23/2023 31    • ANION GAP 05/23/2023 -1 (L)    • BUN 05/23/2023 41 (H)    • Creatinine 05/23/2023 1.94 (H)    • Glucose, Fasting 05/23/2023 200 (H)    • Calcium 05/23/2023 9.1    • Corrected Calcium 05/23/2023 9.7    • AST 05/23/2023 19    • ALT 05/23/2023 54    • Alkaline Phosphatase 05/23/2023 65    • Total Protein 05/23/2023 6.6    • Albumin 05/23/2023 3.2 (L)    • Total Bilirubin 05/23/2023 0.27    • eGFR 05/23/2023 24    • WBC 05/23/2023 12.21 (H)    • RBC 05/23/2023 4.15    • Hemoglobin 05/23/2023 11.8    • Hematocrit 05/23/2023 37.9    • MCV 05/23/2023 91    • MCH 05/23/2023 28.4    • MCHC 05/23/2023 31.1 (L)    • RDW 05/23/2023 14.5    • Platelets 42/20/9942 301    • MPV 05/23/2023 11.3    • Total CK 05/23/2023 27    • Cholesterol 05/23/2023 279 (H)    • Triglycerides 05/23/2023 641 (H)    • HDL, Direct 05/23/2023 50    • LDL Calculated 05/23/2023     • Magnesium 05/23/2023 2.4    • Phosphorus 05/23/2023 3.2    • Creatinine, Ur 05/23/2023 70.9    • Protein Urine Random 05/23/2023 150    • Prot/Creat Ratio, Ur 05/23/2023 2.12 (H)    • PTH 05/23/2023 71.3    • Vit D, 25-Hydroxy 05/23/2023 22.9 (L)    • Urine Culture 05/23/2023 >100,000 cfu/ml Enterobacter cloacae - CRE (A)    • LDL Direct 05/23/2023 87    • IMP 05/23/2023 Not Detected    • VIM 05/23/2023 Not Detected    • KPC 05/23/2023 Not Detected    • OXA-48 05/23/2023 Not Detected    • NDM 05/23/2023 Not Detected    Appointment on 05/16/2023   Component Date Value   • Sodium 05/16/2023 132 (L)    • Potassium 05/16/2023 4.2    • Chloride 05/16/2023 105    • CO2 05/16/2023 25    • ANION GAP 05/16/2023 2 (L)    • BUN 05/16/2023 46 (H)    • Creatinine 05/16/2023 2.10 (H)    • Glucose, Fasting 05/16/2023 184 (H)    • Calcium 05/16/2023 9.1    • eGFR 05/16/2023 22    • Calcium, Ionized 05/16/2023 1.15    There may be more visits with results that are not included. Lab Results   Component Value Date    TRIG 641 (H) 05/23/2023    HDL 50 05/23/2023    LDLDIRECT 87 05/23/2023     Imaging: No results found. Review of Systems:  Review of Systems   Constitutional: Negative for activity change, appetite change, chills, diaphoresis, fatigue and unexpected weight change. HENT: Negative for hearing loss, nosebleeds and sore throat. Eyes: Negative for photophobia and visual disturbance. Respiratory: Negative for cough, chest tightness, shortness of breath and wheezing. Cardiovascular: Positive for palpitations. Negative for chest pain and leg swelling. Gastrointestinal: Negative for abdominal pain, diarrhea, nausea and vomiting. Endocrine: Negative for polyuria. Genitourinary: Negative for dysuria, frequency and hematuria. Musculoskeletal: Negative for arthralgias, back pain, gait problem and neck pain. Skin: Negative for pallor and rash. Neurological: Positive for light-headedness. Negative for dizziness, syncope and headaches. Hematological: Does not bruise/bleed easily. Psychiatric/Behavioral: Negative for behavioral problems and confusion. Physical Exam:  Physical Exam  Vitals reviewed. Constitutional:       Appearance: She is well-developed. She is not diaphoretic. HENT:      Head: Normocephalic and atraumatic. Nose: Nose normal.   Eyes:      General: No scleral icterus. Pupils: Pupils are equal, round, and reactive to light. Neck:      Vascular: No JVD. Cardiovascular:      Rate and Rhythm: Normal rate and regular rhythm. Heart sounds: Normal heart sounds. No murmur heard.      No friction rub. No gallop. Pulmonary:      Effort: Pulmonary effort is normal. No respiratory distress. Breath sounds: Normal breath sounds. No wheezing or rales. Abdominal:      General: Bowel sounds are normal. There is no distension. Palpations: Abdomen is soft. Tenderness: There is no abdominal tenderness. Musculoskeletal:         General: No deformity. Normal range of motion. Cervical back: Normal range of motion and neck supple. Skin:     General: Skin is warm and dry. Findings: No rash. Neurological:      Mental Status: She is alert and oriented to person, place, and time. Cranial Nerves: No cranial nerve deficit. Psychiatric:         Behavior: Behavior normal.       Blood pressure 120/60, pulse 63, height 5' 3" (1.6 m), weight 78.1 kg (172 lb 3.2 oz). Discussion/Summary:  Chest pain:  She does have risk factors of age, HTN, HLD, post-menopausal, family history of CAD, former smoker and had a stress test in June 2018. We rechecked stress testing in Sept 2021 with new symptoms, which was normal.  Patient had recurrent chest pain resulting in another nuclear stress test in June 2023 which was also normal for perfusion defects and ejection fraction. She also had a Zio patch monitor for palpitations with symptoms correlating with isolated ventricular ectopy and no significant arrhythmias during monitor time period. Higher dose of metoprolol seems to be well-tolerated.     Pulm HTN: PASP noted to be 31 mmHg in June 2018 - which is not in the range of pulm HTN, so unlikely to be contributing to symptoms. Continue to follow serially with symptoms - mild elevations likely in the setting of COPD exacerbations. No significant change.     HTN: continued on amlodipine, relatively well controlled today - increased to 5mg twice daily - is being managed by Dr. Demian Mark from nephrology - now back to daily dosing due to lower BPs. Continue current regimen for now, remains well controlled. Higher dose of metoprolol is tolerated well by blood pressure and heart rate.     HLD: continued on zetia and statin with an LDL of 6 in May 2019. TG are 307 - which will not be helped by the statin or zetia. We stopped both and started on wellchol instead of Tricor since that hasn't done much - but stopped by her PCP. Continues on fish oil. Recheck revealed TG of 215, which is much improved. This will be continued to be followed by her PCP. LDL 34 in May 2021, at goal - was unable to be calculated in Oct 2021 due to significantly elevated TG of 450 - recommended to have tighter BS control and watching carbohydrates in diet. LDL 26  in April 2022 - much improved and at goal.  LDL now 21 with TG of 226 in September 2022, advised to be more cautious with her diet again. LDL well controlled at 87 in May 2023.

## 2023-07-19 ENCOUNTER — HOSPITAL ENCOUNTER (OUTPATIENT)
Dept: RADIOLOGY | Facility: HOSPITAL | Age: 73
Discharge: HOME | End: 2023-07-19
Payer: MEDICARE

## 2023-07-19 DIAGNOSIS — N64.4 BREAST PAIN: ICD-10-CM

## 2023-07-19 DIAGNOSIS — N64.52 BREAST DISCHARGE: ICD-10-CM

## 2023-07-19 PROCEDURE — 76642 ULTRASOUND BREAST LIMITED: CPT

## 2023-07-19 PROCEDURE — G0279 TOMOSYNTHESIS, MAMMO: HCPCS

## 2023-07-19 PROCEDURE — 77066 DX MAMMO INCL CAD BI: CPT

## 2023-07-20 DIAGNOSIS — I12.9 HYPERTENSIVE CHRONIC KIDNEY DISEASE WITH STAGE 1 THROUGH STAGE 4 CHRONIC KIDNEY DISEASE, OR UNSPECIFIED CHRONIC KIDNEY DISEASE: ICD-10-CM

## 2023-07-20 DIAGNOSIS — R80.1 PERSISTENT PROTEINURIA: ICD-10-CM

## 2023-07-20 DIAGNOSIS — E11.21 DIABETIC NEPHROPATHY ASSOCIATED WITH TYPE 2 DIABETES MELLITUS (HCC): ICD-10-CM

## 2023-07-20 DIAGNOSIS — E55.9 VITAMIN D DEFICIENCY: ICD-10-CM

## 2023-07-20 DIAGNOSIS — E78.2 MIXED HYPERLIPIDEMIA: ICD-10-CM

## 2023-07-20 DIAGNOSIS — N18.4 STAGE 4 CHRONIC KIDNEY DISEASE (HCC): ICD-10-CM

## 2023-07-20 RX ORDER — SPIRONOLACTONE 25 MG/1
25 TABLET ORAL DAILY
Qty: 90 TABLET | Refills: 2 | Status: SHIPPED | OUTPATIENT
Start: 2023-07-20

## 2023-07-31 DIAGNOSIS — I10 ESSENTIAL HYPERTENSION: ICD-10-CM

## 2023-08-01 RX ORDER — TORSEMIDE 10 MG/1
5 TABLET ORAL DAILY
Qty: 30 TABLET | Refills: 1 | Status: SHIPPED | OUTPATIENT
Start: 2023-08-01 | End: 2023-11-29

## 2023-08-06 DIAGNOSIS — I10 ESSENTIAL HYPERTENSION: ICD-10-CM

## 2023-08-07 ENCOUNTER — OFFICE VISIT (OUTPATIENT)
Dept: INTERNAL MEDICINE CLINIC | Facility: CLINIC | Age: 73
End: 2023-08-07
Payer: MEDICARE

## 2023-08-07 VITALS
SYSTOLIC BLOOD PRESSURE: 134 MMHG | HEART RATE: 65 BPM | HEIGHT: 62 IN | WEIGHT: 170.4 LBS | BODY MASS INDEX: 31.36 KG/M2 | DIASTOLIC BLOOD PRESSURE: 76 MMHG | TEMPERATURE: 98.2 F | OXYGEN SATURATION: 97 %

## 2023-08-07 DIAGNOSIS — N18.4 STAGE 4 CHRONIC KIDNEY DISEASE (HCC): ICD-10-CM

## 2023-08-07 DIAGNOSIS — N30.00 ACUTE CYSTITIS WITHOUT HEMATURIA: Primary | ICD-10-CM

## 2023-08-07 DIAGNOSIS — K21.9 GASTROESOPHAGEAL REFLUX DISEASE WITHOUT ESOPHAGITIS: ICD-10-CM

## 2023-08-07 DIAGNOSIS — I83.813 VARICOSE VEINS OF BOTH LOWER EXTREMITIES WITH PAIN: ICD-10-CM

## 2023-08-07 DIAGNOSIS — E03.9 HYPOTHYROIDISM, UNSPECIFIED TYPE: ICD-10-CM

## 2023-08-07 PROBLEM — Z86.0100 HISTORY OF COLON POLYPS: Status: RESOLVED | Noted: 2021-07-30 | Resolved: 2023-08-07

## 2023-08-07 PROBLEM — S02.31XD: Status: RESOLVED | Noted: 2023-03-22 | Resolved: 2023-08-07

## 2023-08-07 PROBLEM — G63 B12 NEUROPATHY (HCC): Status: ACTIVE | Noted: 2021-03-02

## 2023-08-07 PROBLEM — I12.9 HYPERTENSIVE CHRONIC KIDNEY DISEASE WITH STAGE 1 THROUGH STAGE 4 CHRONIC KIDNEY DISEASE, OR UNSPECIFIED CHRONIC KIDNEY DISEASE: Status: RESOLVED | Noted: 2018-09-19 | Resolved: 2023-08-07

## 2023-08-07 PROBLEM — N64.52 BREAST DISCHARGE: Status: RESOLVED | Noted: 2023-06-19 | Resolved: 2023-08-07

## 2023-08-07 PROBLEM — R07.89 OTHER CHEST PAIN: Status: RESOLVED | Noted: 2021-09-13 | Resolved: 2023-08-07

## 2023-08-07 PROBLEM — N64.4 BREAST PAIN: Status: RESOLVED | Noted: 2023-06-19 | Resolved: 2023-08-07

## 2023-08-07 PROBLEM — M81.0 OSTEOPOROSIS: Status: ACTIVE | Noted: 2022-04-08

## 2023-08-07 PROBLEM — E53.8 B12 NEUROPATHY (HCC): Status: ACTIVE | Noted: 2021-03-02

## 2023-08-07 PROBLEM — R74.8 ELEVATED LIVER ENZYMES: Status: RESOLVED | Noted: 2023-04-04 | Resolved: 2023-08-07

## 2023-08-07 PROBLEM — Z86.010 HISTORY OF COLON POLYPS: Status: RESOLVED | Noted: 2021-07-30 | Resolved: 2023-08-07

## 2023-08-07 PROBLEM — Z87.442 HISTORY OF KIDNEY STONES: Status: RESOLVED | Noted: 2020-09-29 | Resolved: 2023-08-07

## 2023-08-07 PROBLEM — E78.1 HYPERTRIGLYCERIDEMIA: Status: ACTIVE | Noted: 2020-05-08

## 2023-08-07 PROBLEM — S22.41XA MULTIPLE CLOSED FRACTURES OF RIBS OF RIGHT SIDE: Status: RESOLVED | Noted: 2023-03-22 | Resolved: 2023-08-07

## 2023-08-07 PROBLEM — G47.33 OSA (OBSTRUCTIVE SLEEP APNEA): Status: RESOLVED | Noted: 2019-04-09 | Resolved: 2023-08-07

## 2023-08-07 PROBLEM — Z87.19 HISTORY OF REPAIR OF HIATAL HERNIA: Status: RESOLVED | Noted: 2020-05-03 | Resolved: 2023-08-07

## 2023-08-07 PROBLEM — Z98.890 S/P FOOT SURGERY: Status: RESOLVED | Noted: 2022-07-20 | Resolved: 2023-08-07

## 2023-08-07 PROBLEM — S02.2XXA NASAL BONES, CLOSED FRACTURE: Status: RESOLVED | Noted: 2023-03-22 | Resolved: 2023-08-07

## 2023-08-07 PROBLEM — S00.81XA FACIAL ABRASION, INITIAL ENCOUNTER: Status: RESOLVED | Noted: 2023-03-22 | Resolved: 2023-08-07

## 2023-08-07 PROBLEM — E11.9 INSULIN DEPENDENT TYPE 2 DIABETES MELLITUS (HCC): Status: RESOLVED | Noted: 2019-06-27 | Resolved: 2023-08-07

## 2023-08-07 PROBLEM — R10.13 EPIGASTRIC ABDOMINAL PAIN: Status: RESOLVED | Noted: 2021-05-17 | Resolved: 2023-08-07

## 2023-08-07 PROBLEM — Z79.4 INSULIN DEPENDENT TYPE 2 DIABETES MELLITUS (HCC): Status: RESOLVED | Noted: 2019-06-27 | Resolved: 2023-08-07

## 2023-08-07 PROBLEM — Z98.890 HISTORY OF REPAIR OF HIATAL HERNIA: Status: RESOLVED | Noted: 2020-05-03 | Resolved: 2023-08-07

## 2023-08-07 PROCEDURE — 99214 OFFICE O/P EST MOD 30 MIN: CPT | Performed by: INTERNAL MEDICINE

## 2023-08-07 RX ORDER — FAMOTIDINE 40 MG/1
40 TABLET, FILM COATED ORAL DAILY
Qty: 90 TABLET | Refills: 0 | Status: SHIPPED | OUTPATIENT
Start: 2023-08-07

## 2023-08-07 RX ORDER — FLUCONAZOLE 200 MG/1
200 TABLET ORAL DAILY
Qty: 14 TABLET | Refills: 0 | Status: SHIPPED | OUTPATIENT
Start: 2023-08-07 | End: 2023-08-19

## 2023-08-07 NOTE — PROGRESS NOTES
Name: Jennifer Parrish      : 1950      MRN: [id number]  Encounter Provider: Shlomo Bucio MD  Encounter Date: 2023   Encounter department: [facility] INTERNAL MEDICINE [place]    Assessment & Plan     1. Acute cystitis without hematuria  Assessment & Plan:  + LUTS sx since . Had urine cx at end of  which grew >100K lactobacillus. She was treated with Augmentin though continued to have symptoms. Straight caths daily, notes cloudy urine, pelvic pain and dysuria.  +fatigue may be stemming from infection, though otherwise afebrile. Rx for fluconazole 200 mg daily x 14 days. Orders:  -     fluconazole (DIFLUCAN) 200 mg tablet; Take 1 tablet (200 mg total) by mouth daily for 14 days    2. Hypothyroidism, unspecified type  Assessment & Plan:  + fatigue. Maintained on levothyroxine. Recheck TSH. Orders:  -     TSH, 3rd generation with Free T4 reflex; Future    3. Gastroesophageal reflux disease without esophagitis  Assessment & Plan:  Sx not well controlled on PPI BID. She reports mostly adherent to dieteray modification and restrictions. Add once daily H2 blocker with famotidine 40 mg in the evening. Follows with GI    Orders:  -     famotidine (PEPCID) 40 MG tablet; Take 1 tablet (40 mg total) by mouth daily    4. Stage 4 chronic kidney disease [facility])  Assessment & Plan:  Lab Results   Component Value Date    EGFR 19 2023    EGFR 27 2023    EGFR 20 2023    CREATININE 2.34 (H) 2023    CREATININE 1.81 (H) 2023    CREATININE 2.26 (H) 2023     Follows closely with Nephrology. sCr rise with addition of spironolactone. Will recheck BMP to assess for stability, continue current antihypertensive regimen as is for now. Orders:  -     Basic metabolic panel; Future    5. Varicose veins of both lower extremities with pain  -     Compression Stocking           Subjective      I had the pleasure of seeing Ms. Eugenia Heck in the office today for follow-up. Patient's main complaint today is fatigue and UTI symptoms. She complains of pelvic discomfort and dysuria for the past few months. She was treated with course of antibiotics at the end of June though does not feel that her symptoms got much better. She does straight cath and notes cloudy urine. Mahad Pelaez is also not driving anymore due to concerns with memory impairment and safety. She is using walker to help with ambulation. She does feel that her medical comorbid ites are affecting overall quality of her life which is frustrating for her. She follows closely with multiple specialists for her medical comorbidities including Nephrology, Endocrinology, Cardiology, Gastroenterology, Rheumatology, etc.      Review of Systems   Constitutional: Positive for activity change and fatigue. Negative for chills. Cardiovascular: Negative for chest pain and leg swelling. Gastrointestinal: Positive for constipation. Negative for diarrhea, nausea and vomiting. Genitourinary: Positive for dysuria, flank pain, frequency, pelvic pain and urgency. Musculoskeletal: Positive for arthralgias, back pain, gait problem and myalgias.        Current Outpatient Medications on File Prior to Visit   Medication Sig   • acetaminophen (TYLENOL) 325 mg tablet Take 3 tablets (975 mg total) by mouth every 8 (eight) hours   • albuterol (Ventolin HFA) 90 mcg/act inhaler Inhale 2 puffs every 6 (six) hours as needed for wheezing   • amLODIPine (NORVASC) 5 mg tablet Take 1 tablet (5 mg total) by mouth daily   • B-D ULTRAFINE III SHORT PEN 31G X 8 MM MISC    • Calcium Citrate 1040 MG TABS Take 500 mg by mouth Three times a day   • Coenzyme Q10 (CoQ10) 100 MG CAPS Take 100 mg by mouth in the morning   • Cranberry 200 MG CAPS Take 200 capsules by mouth 3 (three) times a day   • DULoxetine (CYMBALTA) 30 mg delayed release capsule Take 60 mg by mouth daily   • estradiol (ESTRACE VAGINAL) 0.1 mg/g vaginal cream Apply pea sized amount around urethra and inside vaginal opening 3 times weekly   • gabapentin (Neurontin) 300 mg capsule Take 1 capsule (300 mg total) by mouth daily at bedtime   • Icosapent Ethyl 1 g CAPS Take 1 capsule by mouth 2 (two) times a day   • insulin aspart (NovoLOG) 100 units/mL injection Inject under the skin 3 (three) times a day before meals 16 units, 16 units, 25 units. • insulin detemir (Levemir FlexTouch) 100 Units/mL injection pen Inject 50 Units under the skin every evening    • levothyroxine 112 mcg tablet Take 112 mcg by mouth every morning   • metoprolol tartrate (LOPRESSOR) 50 mg tablet Take 1 tablet (50 mg total) by mouth every 12 (twelve) hours   • olmesartan (BENICAR) 40 mg tablet Take 1 tablet (40 mg total) by mouth daily   • pantoprazole (PROTONIX) 40 mg tablet TAKE 1 TABLET BY MOUTH TWICE A DAY   • pramipexole (MIRAPEX) 0.25 mg tablet Take 1 tablet (0.25 mg total) by mouth daily at bedtime   • Probiotic Product (PROBIOTIC PO) Take 1 capsule by mouth 2 (two) times a day   • rosuvastatin (CRESTOR) 5 mg tablet Take 1 tablet (5 mg total) by mouth daily   • spironolactone (ALDACTONE) 25 mg tablet TAKE 1 TABLET (25 MG TOTAL) BY MOUTH DAILY. • torsemide (DEMADEX) 10 mg tablet Take 0.5 tablets (5 mg total) by mouth daily   • [DISCONTINUED] Vitamin D, Ergocalciferol, 2000 units CAPS Take 2,000 Units by mouth daily   • b complex vitamins capsule Take 1 capsule by mouth daily (Patient not taking: Reported on 5/24/2023)   • [DISCONTINUED] ergocalciferol (ERGOCALCIFEROL) 1.25 MG (28504 UT) capsule Take 1 capsule (50,000 Units total) by mouth once a week Weekly x 8 weeks then Vit D 2000 iu daily OTC thereafter (Patient not taking: Reported on 7/18/2023)       Objective     /76 (BP Location: Left arm, Patient Position: Sitting, Cuff Size: Large)   Pulse 65   Temp 98.2 °F (36.8 °C) (Tympanic)   Ht 5' 2" (1.575 m)   Wt 77.3 kg (170 lb 6.4 oz)   SpO2 97%   BMI 31.17 kg/m²     Physical Exam  Vitals reviewed.    Constitutional: General: She is not in acute distress. Appearance: She is obese. She is not toxic-appearing. Cardiovascular:      Rate and Rhythm: Normal rate and regular rhythm. Heart sounds: No murmur heard. Pulmonary:      Effort: No respiratory distress. Breath sounds: No wheezing, rhonchi or rales. Abdominal:      General: There is no distension. Palpations: There is no mass. Tenderness: There is no abdominal tenderness. Musculoskeletal:         General: No swelling. Comments: Varicose veins noted. Skin:     General: Skin is warm and dry. Neurological:      Mental Status: Mental status is at baseline.        Tito Tuttle MD

## 2023-08-07 NOTE — ASSESSMENT & PLAN NOTE
· Sx not well controlled on PPI BID. She reports mostly adherent to dieteray modification and restrictions. Add once daily H2 blocker with famotidine 40 mg in the evening.     · Follows with GI

## 2023-08-07 NOTE — ASSESSMENT & PLAN NOTE
· + LUTS sx since June. Had urine cx at end of June which grew >100K lactobacillus. She was treated with Augmentin though continued to have symptoms. Straight caths daily, notes cloudy urine, pelvic pain and dysuria.  +fatigue may be stemming from infection, though otherwise afebrile. · Rx for fluconazole 200 mg daily x 14 days.

## 2023-08-07 NOTE — ASSESSMENT & PLAN NOTE
Lab Results   Component Value Date    EGFR 19 07/05/2023    EGFR 27 06/13/2023    EGFR 20 06/03/2023    CREATININE 2.34 (H) 07/05/2023    CREATININE 1.81 (H) 06/13/2023    CREATININE 2.26 (H) 06/03/2023     · Follows closely with Nephrology. sCr rise with addition of spironolactone. Will recheck BMP to assess for stability, continue current antihypertensive regimen as is for now.

## 2023-08-09 ENCOUNTER — APPOINTMENT (OUTPATIENT)
Dept: LAB | Facility: AMBULARY SURGERY CENTER | Age: 73
End: 2023-08-09
Payer: MEDICARE

## 2023-08-09 ENCOUNTER — LAB (OUTPATIENT)
Dept: LAB | Facility: AMBULARY SURGERY CENTER | Age: 73
End: 2023-08-09
Payer: MEDICARE

## 2023-08-09 ENCOUNTER — DOCUMENTATION (OUTPATIENT)
Dept: NEPHROLOGY | Facility: CLINIC | Age: 73
End: 2023-08-09

## 2023-08-09 DIAGNOSIS — E03.9 HYPOTHYROIDISM, UNSPECIFIED TYPE: ICD-10-CM

## 2023-08-09 DIAGNOSIS — N18.4 STAGE 4 CHRONIC KIDNEY DISEASE (HCC): ICD-10-CM

## 2023-08-09 LAB
ANION GAP SERPL CALCULATED.3IONS-SCNC: 6 MMOL/L
BUN SERPL-MCNC: 51 MG/DL (ref 5–25)
CALCIUM SERPL-MCNC: 9.5 MG/DL (ref 8.3–10.1)
CHLORIDE SERPL-SCNC: 105 MMOL/L (ref 96–108)
CO2 SERPL-SCNC: 25 MMOL/L (ref 21–32)
CREAT SERPL-MCNC: 2.72 MG/DL (ref 0.6–1.3)
GFR SERPL CREATININE-BSD FRML MDRD: 16 ML/MIN/1.73SQ M
GLUCOSE P FAST SERPL-MCNC: 248 MG/DL (ref 65–99)
POTASSIUM SERPL-SCNC: 5.2 MMOL/L (ref 3.5–5.3)
SODIUM SERPL-SCNC: 136 MMOL/L (ref 135–147)
T4 FREE SERPL-MCNC: 0.71 NG/DL (ref 0.61–1.12)
TSH SERPL DL<=0.05 MIU/L-ACNC: 8.11 UIU/ML (ref 0.45–4.5)

## 2023-08-09 PROCEDURE — 84443 ASSAY THYROID STIM HORMONE: CPT

## 2023-08-09 PROCEDURE — 80048 BASIC METABOLIC PNL TOTAL CA: CPT

## 2023-08-09 PROCEDURE — 84439 ASSAY OF FREE THYROXINE: CPT

## 2023-08-09 PROCEDURE — 36415 COLL VENOUS BLD VENIPUNCTURE: CPT

## 2023-08-09 NOTE — PROGRESS NOTES
Home BP readings:  AM: 141/75, standing 120/68  PM: 139/71, standing 134/67  Heart rate: 60-70 range    Recommendations:  1. As long as patient is feeling well I would make no changes given significant orthostasis and low diastolic readings at this time. I would push diet and exercise and avoidance of salt    2.   Have her send in a week of blood pressure readings in about 2 months

## 2023-08-09 NOTE — PROGRESS NOTES
Spoke with patient about the following, asked her to call back if she has any questions:    Recommendations:  1. As long as patient is feeling well I would make no changes given significant orthostasis and low diastolic readings at this time. I would push diet and exercise and avoidance of salt    2.   Have her send in a week of blood pressure readings in about 2 months

## 2023-08-10 ENCOUNTER — TELEPHONE (OUTPATIENT)
Dept: NEPHROLOGY | Facility: CLINIC | Age: 73
End: 2023-08-10

## 2023-08-10 DIAGNOSIS — N18.4 STAGE 4 CHRONIC KIDNEY DISEASE (HCC): Primary | ICD-10-CM

## 2023-08-10 NOTE — RESULT ENCOUNTER NOTE
Michele Quiroz,    You renal function has worsened with the addition of spironolactone, I have sent a message to Dr. Adriana Mccoy to let him know that I would like you to stop taking that medication. I believe you were taking 25 mg correct? We should recheck your kidney function in another 2-3 weeks after you have been off the medication. Have you started the fluconazole for the UTI? How are your symptoms with that, are you noticing any improvement? Your TSH is high, are you taking levothyroxine 112 mcg every morning? Missing any doses by any chance? Also, levothyroxine should be taken by itself without food or any other medication for about an hour for proper absorption. We may need to increase your levothyroxine dose based on what you tell me.       Dr. Megan Spaulding

## 2023-08-10 NOTE — TELEPHONE ENCOUNTER
----- Message from Willie Richards MD sent at 8/9/2023  3:51 PM EDT -----  Unfortunately her potassium is high and creatinine slightly higher so she cannot take spironolactone I would have her stop this now!   Repeat a basic metabolic profile 1 to 2 weeks  Also then, have her repeat blood pressures in about 3 to 4 weeks  ----- Message -----  From: Lab, Background User  Sent: 8/9/2023   3:42 PM EDT  To: Willie Richards MD

## 2023-08-14 ENCOUNTER — TELEPHONE (OUTPATIENT)
Dept: UROLOGY | Facility: CLINIC | Age: 73
End: 2023-08-14

## 2023-08-14 DIAGNOSIS — N39.0 RECURRENT UTI: Primary | ICD-10-CM

## 2023-08-14 RX ORDER — CEFDINIR 300 MG/1
300 CAPSULE ORAL EVERY 12 HOURS SCHEDULED
Qty: 10 CAPSULE | Refills: 0 | Status: SHIPPED | OUTPATIENT
Start: 2023-08-14 | End: 2023-08-16

## 2023-08-14 NOTE — TELEPHONE ENCOUNTER
Left message making opt aware of not below from [facility].       ----- Message from Steve Edwards PA-C sent at 8/14/2023  8:42 AM EDT -----  Please let patient know that her culture is resistant to herself start Keflex. A prescription for cefdinir sent to pharmacy.

## 2023-08-15 RX ORDER — OLMESARTAN MEDOXOMIL 40 MG/1
40 TABLET ORAL DAILY
Qty: 90 TABLET | Refills: 1 | Status: SHIPPED | OUTPATIENT
Start: 2023-08-15 | End: 2023-08-19

## 2023-08-16 ENCOUNTER — APPOINTMENT (EMERGENCY)
Dept: CT IMAGING | Facility: HOSPITAL | Age: 73
DRG: 682 | End: 2023-08-16
Payer: MEDICARE

## 2023-08-16 ENCOUNTER — HOSPITAL ENCOUNTER (INPATIENT)
Facility: HOSPITAL | Age: 73
LOS: 3 days | Discharge: HOME | DRG: 682 | End: 2023-08-19
Attending: STUDENT IN AN ORGANIZED HEALTH CARE EDUCATION/TRAINING PROGRAM | Admitting: INTERNAL MEDICINE
Payer: MEDICARE

## 2023-08-16 ENCOUNTER — APPOINTMENT (OUTPATIENT)
Dept: LAB | Facility: AMBULARY SURGERY CENTER | Age: 73
DRG: 682 | End: 2023-08-16
Payer: MEDICARE

## 2023-08-16 ENCOUNTER — PATIENT MESSAGE (OUTPATIENT)
Dept: NEPHROLOGY | Facility: CLINIC | Age: 73
End: 2023-08-16

## 2023-08-16 DIAGNOSIS — N39.0 RECURRENT UTI: ICD-10-CM

## 2023-08-16 DIAGNOSIS — E87.1 HYPONATREMIA: ICD-10-CM

## 2023-08-16 DIAGNOSIS — N17.9 AKI (ACUTE KIDNEY INJURY) (HCC): Primary | ICD-10-CM

## 2023-08-16 DIAGNOSIS — R41.82 ALTERED MENTAL STATUS, UNSPECIFIED ALTERED MENTAL STATUS TYPE: ICD-10-CM

## 2023-08-16 DIAGNOSIS — R33.9 URINARY RETENTION: ICD-10-CM

## 2023-08-16 DIAGNOSIS — I10 ESSENTIAL HYPERTENSION: ICD-10-CM

## 2023-08-16 DIAGNOSIS — R26.2 AMBULATORY DYSFUNCTION: ICD-10-CM

## 2023-08-16 DIAGNOSIS — Z87.440 HISTORY OF UTI: ICD-10-CM

## 2023-08-16 DIAGNOSIS — N18.4 STAGE 4 CHRONIC KIDNEY DISEASE (HCC): ICD-10-CM

## 2023-08-16 DIAGNOSIS — N30.00 ACUTE CYSTITIS WITHOUT HEMATURIA: ICD-10-CM

## 2023-08-16 DIAGNOSIS — E78.5 DYSLIPIDEMIA: ICD-10-CM

## 2023-08-16 PROBLEM — D64.9 ANEMIA: Status: ACTIVE | Noted: 2023-06-28

## 2023-08-16 PROBLEM — R55 POSTURAL DIZZINESS WITH PRESYNCOPE: Status: ACTIVE | Noted: 2023-08-16

## 2023-08-16 PROBLEM — R42 POSTURAL DIZZINESS WITH PRESYNCOPE: Status: ACTIVE | Noted: 2023-08-16

## 2023-08-16 LAB
2HR DELTA HS TROPONIN: -1 NG/L
ALBUMIN SERPL BCP-MCNC: 4 G/DL (ref 3.5–5)
ALP SERPL-CCNC: 58 U/L (ref 34–104)
ALT SERPL W P-5'-P-CCNC: 14 U/L (ref 7–52)
ANION GAP SERPL CALCULATED.3IONS-SCNC: 10 MMOL/L
ANION GAP SERPL CALCULATED.3IONS-SCNC: 10 MMOL/L
APTT PPP: 24 SECONDS (ref 23–37)
AST SERPL W P-5'-P-CCNC: 17 U/L (ref 13–39)
BACTERIA UR QL AUTO: ABNORMAL /HPF
BASOPHILS # BLD AUTO: 0.04 THOUSANDS/ÂΜL (ref 0–0.1)
BASOPHILS NFR BLD AUTO: 0 % (ref 0–1)
BILIRUB SERPL-MCNC: 0.28 MG/DL (ref 0.2–1)
BILIRUB UR QL STRIP: NEGATIVE
BUN SERPL-MCNC: 84 MG/DL (ref 5–25)
BUN SERPL-MCNC: 85 MG/DL (ref 5–25)
CALCIUM SERPL-MCNC: 8.9 MG/DL (ref 8.4–10.2)
CALCIUM SERPL-MCNC: 9.7 MG/DL (ref 8.3–10.1)
CARDIAC TROPONIN I PNL SERPL HS: 5 NG/L
CARDIAC TROPONIN I PNL SERPL HS: 6 NG/L
CHLORIDE SERPL-SCNC: 102 MMOL/L (ref 96–108)
CHLORIDE SERPL-SCNC: 97 MMOL/L (ref 96–108)
CHOLEST SERPL-MCNC: 131 MG/DL
CLARITY UR: CLEAR
CO2 SERPL-SCNC: 21 MMOL/L (ref 21–32)
CO2 SERPL-SCNC: 21 MMOL/L (ref 21–32)
COLOR UR: COLORLESS
CREAT SERPL-MCNC: 4.21 MG/DL (ref 0.6–1.3)
CREAT SERPL-MCNC: 4.86 MG/DL (ref 0.6–1.3)
EOSINOPHIL # BLD AUTO: 0.24 THOUSAND/ÂΜL (ref 0–0.61)
EOSINOPHIL NFR BLD AUTO: 3 % (ref 0–6)
ERYTHROCYTE [DISTWIDTH] IN BLOOD BY AUTOMATED COUNT: 16.1 % (ref 11.6–15.1)
GFR SERPL CREATININE-BSD FRML MDRD: 8 ML/MIN/1.73SQ M
GFR SERPL CREATININE-BSD FRML MDRD: 9 ML/MIN/1.73SQ M
GLUCOSE P FAST SERPL-MCNC: 181 MG/DL (ref 65–99)
GLUCOSE SERPL-MCNC: 216 MG/DL (ref 65–140)
GLUCOSE SERPL-MCNC: 352 MG/DL (ref 65–140)
GLUCOSE UR STRIP-MCNC: NEGATIVE MG/DL
HCT VFR BLD AUTO: 34.3 % (ref 34.8–46.1)
HDLC SERPL-MCNC: 35 MG/DL
HGB BLD-MCNC: 11.1 G/DL (ref 11.5–15.4)
HGB UR QL STRIP.AUTO: NEGATIVE
IMM GRANULOCYTES # BLD AUTO: 0.04 THOUSAND/UL (ref 0–0.2)
IMM GRANULOCYTES NFR BLD AUTO: 0 % (ref 0–2)
INR PPP: 0.87 (ref 0.84–1.19)
KETONES UR STRIP-MCNC: NEGATIVE MG/DL
LACTATE SERPL-SCNC: 1.1 MMOL/L (ref 0.5–2)
LDLC SERPL DIRECT ASSAY-MCNC: 37 MG/DL (ref 0–100)
LEUKOCYTE ESTERASE UR QL STRIP: ABNORMAL
LYMPHOCYTES # BLD AUTO: 2.46 THOUSANDS/ÂΜL (ref 0.6–4.47)
LYMPHOCYTES NFR BLD AUTO: 25 % (ref 14–44)
MCH RBC QN AUTO: 27.8 PG (ref 26.8–34.3)
MCHC RBC AUTO-ENTMCNC: 32.4 G/DL (ref 31.4–37.4)
MCV RBC AUTO: 86 FL (ref 82–98)
MONOCYTES # BLD AUTO: 0.73 THOUSAND/ÂΜL (ref 0.17–1.22)
MONOCYTES NFR BLD AUTO: 8 % (ref 4–12)
NEUTROPHILS # BLD AUTO: 6.16 THOUSANDS/ÂΜL (ref 1.85–7.62)
NEUTS SEG NFR BLD AUTO: 64 % (ref 43–75)
NITRITE UR QL STRIP: NEGATIVE
NON-SQ EPI CELLS URNS QL MICRO: ABNORMAL /HPF
NRBC BLD AUTO-RTO: 0 /100 WBCS
PH UR STRIP.AUTO: 5.5 [PH]
PLATELET # BLD AUTO: 269 THOUSANDS/UL (ref 149–390)
PMV BLD AUTO: 12.1 FL (ref 8.9–12.7)
POTASSIUM SERPL-SCNC: 4.8 MMOL/L (ref 3.5–5.3)
POTASSIUM SERPL-SCNC: 5.1 MMOL/L (ref 3.5–5.3)
PROT SERPL-MCNC: 6.7 G/DL (ref 6.4–8.4)
PROT UR STRIP-MCNC: ABNORMAL MG/DL
PROTHROMBIN TIME: 12.4 SECONDS (ref 11.6–14.5)
RBC # BLD AUTO: 3.99 MILLION/UL (ref 3.81–5.12)
RBC #/AREA URNS AUTO: ABNORMAL /HPF
SODIUM SERPL-SCNC: 128 MMOL/L (ref 135–147)
SODIUM SERPL-SCNC: 133 MMOL/L (ref 135–147)
SP GR UR STRIP.AUTO: 1.01 (ref 1–1.03)
TRIGL SERPL-MCNC: 476 MG/DL
UROBILINOGEN UR STRIP-ACNC: <2 MG/DL
WBC # BLD AUTO: 9.67 THOUSAND/UL (ref 4.31–10.16)
WBC #/AREA URNS AUTO: ABNORMAL /HPF

## 2023-08-16 PROCEDURE — 80053 COMPREHEN METABOLIC PANEL: CPT

## 2023-08-16 PROCEDURE — 85730 THROMBOPLASTIN TIME PARTIAL: CPT

## 2023-08-16 PROCEDURE — 80061 LIPID PANEL: CPT

## 2023-08-16 PROCEDURE — 80048 BASIC METABOLIC PNL TOTAL CA: CPT

## 2023-08-16 PROCEDURE — 83605 ASSAY OF LACTIC ACID: CPT

## 2023-08-16 PROCEDURE — 83721 ASSAY OF BLOOD LIPOPROTEIN: CPT

## 2023-08-16 PROCEDURE — 85610 PROTHROMBIN TIME: CPT

## 2023-08-16 PROCEDURE — 82948 REAGENT STRIP/BLOOD GLUCOSE: CPT

## 2023-08-16 PROCEDURE — 85025 COMPLETE CBC W/AUTO DIFF WBC: CPT

## 2023-08-16 PROCEDURE — 99285 EMERGENCY DEPT VISIT HI MDM: CPT

## 2023-08-16 PROCEDURE — 81001 URINALYSIS AUTO W/SCOPE: CPT

## 2023-08-16 PROCEDURE — 87077 CULTURE AEROBIC IDENTIFY: CPT

## 2023-08-16 PROCEDURE — 36415 COLL VENOUS BLD VENIPUNCTURE: CPT

## 2023-08-16 PROCEDURE — 93005 ELECTROCARDIOGRAM TRACING: CPT

## 2023-08-16 PROCEDURE — 74176 CT ABD & PELVIS W/O CONTRAST: CPT

## 2023-08-16 PROCEDURE — 99285 EMERGENCY DEPT VISIT HI MDM: CPT | Performed by: INTERNAL MEDICINE

## 2023-08-16 PROCEDURE — 96360 HYDRATION IV INFUSION INIT: CPT

## 2023-08-16 PROCEDURE — 99223 1ST HOSP IP/OBS HIGH 75: CPT | Performed by: INTERNAL MEDICINE

## 2023-08-16 PROCEDURE — 99285 EMERGENCY DEPT VISIT HI MDM: CPT | Performed by: STUDENT IN AN ORGANIZED HEALTH CARE EDUCATION/TRAINING PROGRAM

## 2023-08-16 PROCEDURE — NC001 PR NO CHARGE: Performed by: INTERNAL MEDICINE

## 2023-08-16 PROCEDURE — 87186 SC STD MICRODIL/AGAR DIL: CPT

## 2023-08-16 PROCEDURE — 84484 ASSAY OF TROPONIN QUANT: CPT

## 2023-08-16 PROCEDURE — G1004 CDSM NDSC: HCPCS

## 2023-08-16 PROCEDURE — 87086 URINE CULTURE/COLONY COUNT: CPT

## 2023-08-16 RX ORDER — PANTOPRAZOLE SODIUM 40 MG/1
40 TABLET, DELAYED RELEASE ORAL
Status: DISCONTINUED | OUTPATIENT
Start: 2023-08-17 | End: 2023-08-19 | Stop reason: HOSPADM

## 2023-08-16 RX ORDER — INSULIN LISPRO 100 [IU]/ML
16 INJECTION, SOLUTION INTRAVENOUS; SUBCUTANEOUS
Status: DISCONTINUED | OUTPATIENT
Start: 2023-08-17 | End: 2023-08-19 | Stop reason: HOSPADM

## 2023-08-16 RX ORDER — AMLODIPINE BESYLATE 5 MG/1
5 TABLET ORAL DAILY
Status: DISCONTINUED | OUTPATIENT
Start: 2023-08-17 | End: 2023-08-17

## 2023-08-16 RX ORDER — CHOLECALCIFEROL (VITAMIN D3) 125 MCG
100 CAPSULE ORAL DAILY
Status: DISCONTINUED | OUTPATIENT
Start: 2023-08-16 | End: 2023-08-19 | Stop reason: HOSPADM

## 2023-08-16 RX ORDER — CRANBERRY FRUIT EXTRACT 200 MG
200 CAPSULE ORAL 3 TIMES DAILY
Status: DISCONTINUED | OUTPATIENT
Start: 2023-08-16 | End: 2023-08-16

## 2023-08-16 RX ORDER — LEVOTHYROXINE SODIUM 112 UG/1
112 TABLET ORAL EVERY MORNING
Status: DISCONTINUED | OUTPATIENT
Start: 2023-08-17 | End: 2023-08-19 | Stop reason: HOSPADM

## 2023-08-16 RX ORDER — CALCIUM CARBONATE 500(1250)
1 TABLET ORAL
Status: DISCONTINUED | OUTPATIENT
Start: 2023-08-17 | End: 2023-08-19 | Stop reason: HOSPADM

## 2023-08-16 RX ORDER — FAMOTIDINE 20 MG/1
40 TABLET, FILM COATED ORAL DAILY
Status: DISCONTINUED | OUTPATIENT
Start: 2023-08-17 | End: 2023-08-16

## 2023-08-16 RX ORDER — HEPARIN SODIUM 5000 [USP'U]/ML
5000 INJECTION, SOLUTION INTRAVENOUS; SUBCUTANEOUS EVERY 8 HOURS SCHEDULED
Status: DISCONTINUED | OUTPATIENT
Start: 2023-08-16 | End: 2023-08-19 | Stop reason: HOSPADM

## 2023-08-16 RX ORDER — CHLORAL HYDRATE 500 MG
1000 CAPSULE ORAL 2 TIMES DAILY
Status: DISCONTINUED | OUTPATIENT
Start: 2023-08-16 | End: 2023-08-19 | Stop reason: HOSPADM

## 2023-08-16 RX ORDER — DULOXETIN HYDROCHLORIDE 60 MG/1
60 CAPSULE, DELAYED RELEASE ORAL DAILY
Status: DISCONTINUED | OUTPATIENT
Start: 2023-08-17 | End: 2023-08-19 | Stop reason: HOSPADM

## 2023-08-16 RX ORDER — FAMOTIDINE 20 MG/1
10 TABLET, FILM COATED ORAL DAILY
Status: DISCONTINUED | OUTPATIENT
Start: 2023-08-17 | End: 2023-08-19 | Stop reason: HOSPADM

## 2023-08-16 RX ORDER — SACCHAROMYCES BOULARDII 250 MG
250 CAPSULE ORAL 2 TIMES DAILY
Status: DISCONTINUED | OUTPATIENT
Start: 2023-08-16 | End: 2023-08-19 | Stop reason: HOSPADM

## 2023-08-16 RX ORDER — PRAMIPEXOLE DIHYDROCHLORIDE 0.25 MG/1
0.25 TABLET ORAL
Status: DISCONTINUED | OUTPATIENT
Start: 2023-08-16 | End: 2023-08-19 | Stop reason: HOSPADM

## 2023-08-16 RX ORDER — GABAPENTIN 300 MG/1
300 CAPSULE ORAL
Status: DISCONTINUED | OUTPATIENT
Start: 2023-08-16 | End: 2023-08-19 | Stop reason: HOSPADM

## 2023-08-16 RX ORDER — INSULIN LISPRO 100 [IU]/ML
25 INJECTION, SOLUTION INTRAVENOUS; SUBCUTANEOUS
Status: DISCONTINUED | OUTPATIENT
Start: 2023-08-16 | End: 2023-08-19 | Stop reason: HOSPADM

## 2023-08-16 RX ORDER — ALBUTEROL SULFATE 90 UG/1
2 AEROSOL, METERED RESPIRATORY (INHALATION) EVERY 6 HOURS PRN
Status: DISCONTINUED | OUTPATIENT
Start: 2023-08-16 | End: 2023-08-19 | Stop reason: HOSPADM

## 2023-08-16 RX ORDER — ATORVASTATIN CALCIUM 10 MG/1
10 TABLET, FILM COATED ORAL
Status: DISCONTINUED | OUTPATIENT
Start: 2023-08-16 | End: 2023-08-19 | Stop reason: HOSPADM

## 2023-08-16 RX ORDER — ACETAMINOPHEN 325 MG/1
975 TABLET ORAL EVERY 8 HOURS SCHEDULED
Status: DISCONTINUED | OUTPATIENT
Start: 2023-08-16 | End: 2023-08-16

## 2023-08-16 RX ORDER — ICOSAPENT ETHYL 1000 MG/1
1 CAPSULE ORAL 2 TIMES DAILY
Status: DISCONTINUED | OUTPATIENT
Start: 2023-08-16 | End: 2023-08-16

## 2023-08-16 RX ORDER — ACETAMINOPHEN 325 MG/1
975 TABLET ORAL EVERY 8 HOURS PRN
Status: DISCONTINUED | OUTPATIENT
Start: 2023-08-16 | End: 2023-08-19 | Stop reason: HOSPADM

## 2023-08-16 RX ORDER — METOPROLOL TARTRATE 50 MG/1
50 TABLET, FILM COATED ORAL EVERY 12 HOURS SCHEDULED
Status: DISCONTINUED | OUTPATIENT
Start: 2023-08-16 | End: 2023-08-19 | Stop reason: HOSPADM

## 2023-08-16 RX ADMIN — GABAPENTIN 300 MG: 300 CAPSULE ORAL at 20:45

## 2023-08-16 RX ADMIN — SODIUM CHLORIDE 1000 ML: 0.9 INJECTION, SOLUTION INTRAVENOUS at 17:01

## 2023-08-16 RX ADMIN — HEPARIN SODIUM 5000 UNITS: 5000 INJECTION INTRAVENOUS; SUBCUTANEOUS at 20:45

## 2023-08-16 RX ADMIN — PRAMIPEXOLE DIHYDROCHLORIDE 0.25 MG: 0.25 TABLET ORAL at 20:45

## 2023-08-16 RX ADMIN — Medication 250 MG: at 20:31

## 2023-08-16 RX ADMIN — INSULIN DETEMIR 50 UNITS: 100 INJECTION, SOLUTION SUBCUTANEOUS at 20:31

## 2023-08-16 RX ADMIN — METOPROLOL TARTRATE 50 MG: 50 TABLET, FILM COATED ORAL at 20:31

## 2023-08-16 RX ADMIN — Medication 100 MG: at 20:45

## 2023-08-16 RX ADMIN — SODIUM CHLORIDE 500 MG: 9 INJECTION, SOLUTION INTRAVENOUS at 20:31

## 2023-08-16 RX ADMIN — OMEGA-3 FATTY ACIDS CAP 1000 MG 1000 MG: 1000 CAP at 20:31

## 2023-08-16 RX ADMIN — ATORVASTATIN CALCIUM 10 MG: 10 TABLET, FILM COATED ORAL at 20:31

## 2023-08-16 NOTE — ASSESSMENT & PLAN NOTE
Appears to have recurrent UTIs  5/23 patient had Enterobacter cloacae-CRE, susceptible to cefepime, ciprofloxacin, gentamicin, levofloxacin, meropenem  6/23 grew lactobacillus cloacae no sensitivity studies noted  8/9 was noted to have Serratia marcescens- ESBL  Recently patient had Serratia marcescens, on cefdinir by urologist, started on 8/14  Patient has no improvement of symptoms  Will start ertapenem  ID consult

## 2023-08-16 NOTE — ED PROVIDER NOTES
History  Chief Complaint   Patient presents with   • Possible UTI     Patient sent in by nephrologist for possible UTI, hx of MDRO and CRE. Patient also experiencing brain fog     Warren Padilla is a 75 yof with history of recurrent UTI, CKD stage 4, sent in to the ED by her nephrologist for abnormally high creatinine level. States that for the past few days has felt lightheaded, fatigued, generally unwell with body aches, also endorses dysuria without hematuria, also has aching, intermittent, mild, non-radiating left flank pain. Has fallen three times in the past 2 days, no head strike, no LOC, landed on her buttocks each time, has soreness in her tailbone but denies any other injuries. Was advised by her nephrologist to have blood work this morning, which was abnormal.  Denies fever, chills, weakness, URI symptoms, chest pain, shortness of breath, nausea, vomiting, abdominal pain, diarrhea, constipation, back pain, peripheral edema, or unilateral calf pain or swelling. Prior to Admission Medications   Prescriptions Last Dose Informant Patient Reported? Taking?    B-D ULTRAFINE III SHORT PEN 31G X 8 MM MISC  Self Yes No   Calcium Citrate 1040 MG TABS  Self Yes No   Sig: Take 500 mg by mouth Three times a day   Coenzyme Q10 (CoQ10) 100 MG CAPS  Self Yes No   Sig: Take 100 mg by mouth in the morning   Cranberry 200 MG CAPS  Self Yes No   Sig: Take 200 capsules by mouth 3 (three) times a day   DULoxetine (CYMBALTA) 30 mg delayed release capsule  Self Yes No   Sig: Take 60 mg by mouth daily   Icosapent Ethyl 1 g CAPS  Self Yes No   Sig: Take 1 capsule by mouth 2 (two) times a day   Probiotic Product (PROBIOTIC PO)  Self Yes No   Sig: Take 1 capsule by mouth 2 (two) times a day   acetaminophen (TYLENOL) 325 mg tablet  Self No No   Sig: Take 3 tablets (975 mg total) by mouth every 8 (eight) hours   albuterol (Ventolin HFA) 90 mcg/act inhaler  Self No No   Sig: Inhale 2 puffs every 6 (six) hours as needed for wheezing amLODIPine (NORVASC) 5 mg tablet  Self No No   Sig: Take 1 tablet (5 mg total) by mouth daily   b complex vitamins capsule  Self Yes No   Sig: Take 1 capsule by mouth daily   Patient not taking: Reported on 5/24/2023   cefdinir (OMNICEF) 300 mg capsule   No No   Sig: Take 1 capsule (300 mg total) by mouth every 12 (twelve) hours for 5 days   estradiol (ESTRACE VAGINAL) 0.1 mg/g vaginal cream  Self No No   Sig: Apply pea sized amount around urethra and inside vaginal opening 3 times weekly   famotidine (PEPCID) 40 MG tablet   No No   Sig: Take 1 tablet (40 mg total) by mouth daily   fluconazole (DIFLUCAN) 200 mg tablet   No No   Sig: Take 1 tablet (200 mg total) by mouth daily for 14 days   gabapentin (Neurontin) 300 mg capsule  Self No No   Sig: Take 1 capsule (300 mg total) by mouth daily at bedtime   insulin aspart (NovoLOG) 100 units/mL injection  Self Yes No   Sig: Inject under the skin 3 (three) times a day before meals 16 units, 16 units, 25 units.    insulin detemir (Levemir FlexTouch) 100 Units/mL injection pen  Self Yes No   Sig: Inject 50 Units under the skin every evening    levothyroxine 112 mcg tablet  Self Yes No   Sig: Take 112 mcg by mouth every morning   metoprolol tartrate (LOPRESSOR) 50 mg tablet  Self No No   Sig: Take 1 tablet (50 mg total) by mouth every 12 (twelve) hours   olmesartan (BENICAR) 40 mg tablet   No No   Sig: TAKE 1 TABLET BY MOUTH EVERY DAY   pantoprazole (PROTONIX) 40 mg tablet  Self No No   Sig: TAKE 1 TABLET BY MOUTH TWICE A DAY   pramipexole (MIRAPEX) 0.25 mg tablet  Self No No   Sig: Take 1 tablet (0.25 mg total) by mouth daily at bedtime   rosuvastatin (CRESTOR) 5 mg tablet  Self No No   Sig: Take 1 tablet (5 mg total) by mouth daily   torsemide (DEMADEX) 10 mg tablet   No No   Sig: Take 0.5 tablets (5 mg total) by mouth daily      Facility-Administered Medications: None       Past Medical History:   Diagnosis Date   • Actinic keratosis    • Acute cystitis without hematuria 04/28/2023   • Acute-on-chronic kidney injury [facility])    • ANDRA (acute kidney injury) ([address]) 05/08/2020   • Allergic    • Allergic rhinitis    • Anesthesia     pt reports "had to use double lumen endo tube for hiatal hernia repair/so surgeon could get to where he needed to work"   • Arthritis    • Aspiration into airway    • Ahumada esophagus 1993    Lot of stress with children   • Basal cell carcinoma 2007    left cheek    • BCC (basal cell carcinoma) 05/27/2021    Left Nasal tip   • Breast discharge 6/19/2023   • Breast pain 6/19/2023   • Cancer ([address])     squamous cell cancer on forhead   • Cancer ([address])     basal cell on nose    • Cholelithiasis    • Chronic kidney disease 2000, 2018    Stones, kidney disease stage 4   • Chronic pain disorder     bilat feet and joint pain on occas   • Colon polyp    • Constipation    • COPD (chronic obstructive pulmonary disease) ([address])    • COVID-19 08/2021    recovered at home/did receive monoclonal infusion   • COVID-19 virus infection 08/16/2021   • Dental bridge present    • Depression    • Diabetes mellitus ([address])     Type 2   • Diabetic neuropathy ([address])    • Disease of thyroid gland    • Dyspnea    • Elevated liver enzymes 4/4/2023   • Epigastric abdominal pain with severe diabetic gastroparesis, status post EGD/Botox injection, 5/14 5/17/2021   • Facial abrasion, initial encounter 3/22/2023   • Fall 03/22/2023   • Family history of thyroid problem    • Fatty liver    • Fracture of orbital floor, blow-out, right, closed, with routine healing, subsequent encounter 3/22/2023   • GERD (gastroesophageal reflux disease)    • Heart burn    • Hiatal hernia    • History of colon polyps 7/30/2021   • History of kidney stones 9/29/2020    Hx of recurrent kidney stones   • History of kidney stones 9/29/2020    Hx of recurrent kidney stones  Formatting of this note might be different from the original. Hx of recurrent kidney stones  Last Assessment & Plan:  Formatting of this note might be different from the original. We will set her up for follow-up in 3 months with a KUB prior. She knows to call if she has any kidney stone type pain in the meantime. • History of pneumonia    • History of repair of hiatal hernia 5/3/2020   • Hyperlipidemia    • Hyperplasia, parathyroid ([address])    • Hypertension    • Hypotension 04/13/2022   • Kidney problem    • Kidney stone    • Memory loss Julu2 2020   • Motion sickness    • Motion sickness    • Multiple closed fractures of ribs of right side 3/22/2023   • Nasal bones, closed fracture 3/22/2023   • Neck pain    • Obesity 1978   • Obesity (BMI 30.0-34. 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, left     great toe fusion   • Urinary incontinence    • Urinary tract infection 03/28/2022   • Wears glasses        Past Surgical History:   Procedure Laterality Date   • ABDOMINAL SURGERY  [id number]    Nissen x2 1972 tubal ligarion   • ARTHROSCOPY KNEE     • BREAST BIOPSY      stereotactic-benign   • BREAST BIOPSY      stereo-benign   • BREAST CYST EXCISION Bilateral     benign   • BREAST EXCISIONAL BIOPSY      unknown date-benign   • BREAST EXCISIONAL BIOPSY      unknown date-benign   • BREAST EXCISIONAL BIOPSY      unknown date-benign   • BREAST EXCISIONAL BIOPSY      unknown age-benign   • CARDIAC CATHETERIZATION     • CHOLECYSTECTOMY     • COLONOSCOPY     • EXAMINATION UNDER ANESTHESIA N/A 06/24/2021    Procedure: EXAM UNDER ANESTHESIA (EUA), DISE;  Surgeon: Emily Celaya MD;  Location: [facility] MAIN OR;  Service: ENT   • HERNIA REPAIR     • HIATAL HERNIA REPAIR     • KNEE SURGERY      Torn maniscus lap surg   • LIPOSUCTION     • LYMPH NODE BIOPSY     • MOHS SURGERY  05/20/2021    left mid forehead-Gautam   • MOHS SURGERY Left 05/27/2021    Left nasal tip- gautam    • IL LIGATION/BIOPSY TEMPORAL ARTERY Left 01/05/2023    Procedure: BIOPSY ARTERY TEMPORAL;  Surgeon: Carmen Holley DO;  Location: [facility] Main OR;  Service: Vascular   • IL OPEN IMPLANTATION CRANIAL NERVE VASQUEZ & PULSE GEN N/A 11/10/2021    Procedure: INSERTION UPPER AIRWAY STIMULATOR, INSPIRE IMPLANT;  Surgeon: Phebe Duverney, MD;  Location: [facility] Main OR;  Service: ENT   • IL UNLISTED PROCEDURE FOOT/TOES Right 07/19/2022    Procedure: 1st metatarsal phalangeal joint fusion;  Surgeon: Raeann Morris DPM;  Location: [facility];  Service: Podiatry   • REDUCTION MAMMAPLASTY Bilateral 2000   • REDUCTION MAMMAPLASTY     • SKIN BIOPSY     • SKIN CANCER EXCISION  2007    squamous cell carcinoma    • SKIN CANCER EXCISION  2007    basal cell carcinoma   • SQUAMOUS CELL CARCINOMA EXCISION     • TOE SURGERY Right 08/04/2022    Right Great Toe Fusion   • TONSILLECTOMY     • TUBAL LIGATION     • UPPER GASTROINTESTINAL ENDOSCOPY     • US GUIDED BREAST BIOPSY RIGHT COMPLETE Right 07/18/2022       Family History   Problem Relation Age of Onset   • Heart disease Mother    • Depression Mother    • Hypertension Mother    • COPD Mother    • Hearing loss Mother    • Anxiety disorder Mother    • Heart disease Father    • Lung cancer Father 79        Smoker    • Cancer Father         brain   • Alcohol abuse Father    • Dementia Father    • Hypertension Father    • Thyroid disease Father    • COPD Father    • Arthritis Father    • Brain cancer Father 76   • Hypertension Sister    • Diabetes Sister    • Heart disease Sister    • Thyroid disease Sister    • Cancer Sister         Lympoma, lung   • Lung cancer Sister 78   • Anxiety disorder Sister    • Hypertension Brother    • Diabetes Brother    • Cancer Brother         Throat   • Dementia Brother    • Stroke Brother    • Hypertension Brother    • Heart disease Brother    • Diabetes Brother    • Hypertension Brother    • No Known Problems Son    • No Known Problems Son    • Brain cancer Paternal Aunt         unknown age   • Diabetes Sister    • Hypertension Sister    • Diabetes Brother    • Breast cancer Neg Hx      I have reviewed and agree with the history as documented. E-Cigarette/Vaping   • E-Cigarette Use Never User      E-Cigarette/Vaping Substances   • Nicotine No    • THC No    • CBD No    • Flavoring No    • Other No    • Unknown No      Social History     Tobacco Use   • Smoking status: Former     Packs/day: 2.00     Years: 10.00     Total pack years: 20.00     Types: Cigarettes     Start date: 1968     Quit date: 1976     Years since quittin.6     Passive exposure: Past   • Smokeless tobacco: Never   • Tobacco comments:     Smoked 2 pack a day   Vaping Use   • Vaping Use: Never used   Substance Use Topics   • Alcohol use: Yes     Comment: 1 or 2 a year   • Drug use: No        Review of Systems   Constitutional: Positive for fatigue. Negative for appetite change, chills, diaphoresis and fever. HENT: Negative. Eyes: Negative. Negative for visual disturbance. Respiratory: Negative. Negative for shortness of breath. Cardiovascular: Negative. Negative for chest pain, palpitations and leg swelling. Gastrointestinal: Negative. Negative for abdominal distention, abdominal pain, constipation, diarrhea, nausea and vomiting. Endocrine: Negative. Negative for cold intolerance, heat intolerance, polydipsia and polyuria. Genitourinary: Positive for dysuria and flank pain. Negative for decreased urine volume, difficulty urinating, hematuria and pelvic pain. Musculoskeletal: Positive for myalgias. Negative for arthralgias, back pain, gait problem, joint swelling and neck pain. Skin: Negative. Negative for pallor, rash and wound. Allergic/Immunologic: Negative. Neurological: Positive for light-headedness.  Negative for dizziness, syncope, weakness and numbness. Hematological: Negative. Does not bruise/bleed easily. Psychiatric/Behavioral: Negative. Negative for confusion. All other systems reviewed and are negative. Physical Exam  ED Triage Vitals [08/16/23 1352]   Temperature Pulse Respirations Blood Pressure SpO2   97.6 °F (36.4 °C) 68 18 118/72 99 %      Temp Source Heart Rate Source Patient Position - Orthostatic VS BP Location FiO2 (%)   Oral Monitor -- Left arm --      Pain Score       6             Orthostatic Vital Signs  Vitals:    08/16/23 1352   BP: 118/72   Pulse: 68       Physical Exam  Vitals and nursing note reviewed. Exam conducted with a chaperone present. Constitutional:       General: She is not in acute distress. Appearance: Normal appearance. She is not ill-appearing or diaphoretic. HENT:      Head: Normocephalic and atraumatic. Right Ear: External ear normal.      Left Ear: External ear normal.      Nose: Nose normal.      Mouth/Throat:      Mouth: Mucous membranes are moist.      Pharynx: Oropharynx is clear. Eyes:      General: No scleral icterus. Extraocular Movements: Extraocular movements intact. Conjunctiva/sclera: Conjunctivae normal.   Cardiovascular:      Rate and Rhythm: Normal rate and regular rhythm. Pulses: Normal pulses. Heart sounds: Normal heart sounds. No murmur heard. No friction rub. No gallop. Pulmonary:      Effort: Pulmonary effort is normal. No respiratory distress. Breath sounds: Normal breath sounds. Abdominal:      General: Abdomen is flat. Bowel sounds are normal. There is no distension. Palpations: Abdomen is soft. Tenderness: There is no abdominal tenderness. There is left CVA tenderness. There is no right CVA tenderness, guarding or rebound. Musculoskeletal:         General: Normal range of motion. Cervical back: Normal range of motion and neck supple. No tenderness. Right lower leg: No edema. Left lower leg: No edema. Comments: No midline spinal tenderness over C/T/: spines. No thoracic or lumbar muscle spasm. Is tender to palpation over coccyx without obvious deformity, no overlying ecchymosis appreciated. Lymphadenopathy:      Cervical: No cervical adenopathy. Skin:     General: Skin is warm and dry. Capillary Refill: Capillary refill takes less than 2 seconds. Coloration: Skin is not pale. Findings: No bruising, erythema or rash. Neurological:      General: No focal deficit present. Mental Status: She is alert and oriented to person, place, and time. Sensory: No sensory deficit. Gait: Gait abnormal.      Comments: No focal weakness. Requires one person assistance for walking due to lightheadedness causing unsteady gait.    Psychiatric:         Mood and Affect: Mood normal.         Behavior: Behavior normal.         ED Medications  Medications   sodium chloride 0.9 % bolus 1,000 mL (1,000 mL Intravenous New Bag 8/16/23 1701)       Diagnostic Studies  Results Reviewed     Procedure Component Value Units Date/Time    HS Troponin I 4hr [[id number]]     Lab Status: No result Specimen: Blood     Comprehensive metabolic panel [[id number]]  (Abnormal) Collected: 08/16/23 1701    Lab Status: Final result Specimen: Blood from Arm, Left Updated: 08/16/23 1738     Sodium 128 mmol/L      Potassium 5.1 mmol/L      Chloride 97 mmol/L      CO2 21 mmol/L      ANION GAP 10 mmol/L      BUN 85 mg/dL      Creatinine 4.21 mg/dL      Glucose 216 mg/dL      Calcium 8.9 mg/dL      AST 17 U/L      ALT 14 U/L      Alkaline Phosphatase 58 U/L      Total Protein 6.7 g/dL      Albumin 4.0 g/dL      Total Bilirubin 0.28 mg/dL      eGFR 9 ml/min/1.73sq m     Narrative:      [place] guidelines for Chronic Kidney Disease (CKD):   •  Stage 1 with normal or high GFR (GFR > 90 mL/min/1.73 square meters)  •  Stage 2 Mild CKD (GFR = 60-89 mL/min/1.73 square meters)  •  Stage 3A Moderate CKD (GFR = 45-59 mL/min/1.73 square meters)  •  Stage 3B Moderate CKD (GFR = 30-44 mL/min/1.73 square meters)  •  Stage 4 Severe CKD (GFR = 15-29 mL/min/1.73 square meters)  •  Stage 5 End Stage CKD (GFR <15 mL/min/1.73 square meters)  Note: GFR calculation is accurate only with a steady state creatinine    Urine culture [[id number]]     Lab Status: No result Specimen: Urine     HS Troponin I 2hr [[id number]]     Lab Status: No result Specimen: Blood     HS Troponin 0hr (reflex protocol) [[id number]]  (Normal) Collected: 08/16/23 1611    Lab Status: Final result Specimen: Blood from Arm, Left Updated: 08/16/23 1644     hs TnI 0hr 6 ng/L     Urine Microscopic [[id number]]  (Abnormal) Collected: 08/16/23 1611    Lab Status: Final result Specimen: Urine, Clean Catch Updated: 08/16/23 1639     RBC, UA 1-2 /hpf      WBC, UA 4-10 /hpf      Epithelial Cells None Seen /hpf      Bacteria, UA None Seen /hpf     Lactic acid, plasma (w/reflex if result > 2.0) [[id number]]  (Normal) Collected: 08/16/23 1611    Lab Status: Final result Specimen: Blood from Arm, Left Updated: 08/16/23 1636     LACTIC ACID 1.1 mmol/L     Narrative:      Result may be elevated if tourniquet was used during collection.     Giovanni Navi [[id number]]  (Normal) Collected: 08/16/23 1611    Lab Status: Final result Specimen: Blood from Arm, Left Updated: 08/16/23 1635     Protime 12.4 seconds      INR 0.87    APTT [[id number]]  (Normal) Collected: 08/16/23 1611    Lab Status: Final result Specimen: Blood from Arm, Left Updated: 08/16/23 1635     PTT 24 seconds     CBC and differential [[id number]]  (Abnormal) Collected: 08/16/23 1611    Lab Status: Final result Specimen: Blood from Arm, Left Updated: 08/16/23 1628     WBC 9.67 Thousand/uL      RBC 3.99 Million/uL      Hemoglobin 11.1 g/dL      Hematocrit 34.3 %      MCV 86 fL      MCH 27.8 pg      MCHC 32.4 g/dL      RDW 16.1 %      MPV 12.1 fL      Platelets 339 Thousands/uL      nRBC 0 /100 WBCs      Neutrophils Relative 64 % Immat GRANS % 0 %      Lymphocytes Relative 25 %      Monocytes Relative 8 %      Eosinophils Relative 3 %      Basophils Relative 0 %      Neutrophils Absolute 6.16 Thousands/µL      Immature Grans Absolute 0.04 Thousand/uL      Lymphocytes Absolute 2.46 Thousands/µL      Monocytes Absolute 0.73 Thousand/µL      Eosinophils Absolute 0.24 Thousand/µL      Basophils Absolute 0.04 Thousands/µL     UA w Reflex to Microscopic w Reflex to Culture [[id number]]  (Abnormal) Collected: 08/16/23 1611    Lab Status: Final result Specimen: Urine, Clean Catch Updated: 08/16/23 1628     Color, UA Colorless     Clarity, UA Clear     Specific Gravity, UA 1.007     pH, UA 5.5     Leukocytes, UA Trace     Nitrite, UA Negative     Protein, UA Trace mg/dl      Glucose, UA Negative mg/dl      Ketones, UA Negative mg/dl      Urobilinogen, UA <2.0 mg/dl      Bilirubin, UA Negative     Occult Blood, UA Negative                 CT renal stone study abdomen pelvis wo contrast   Final Result by Sudeep Dominguez MD (94/43 0086)      1. No findings of acute abdominopelvic injury or acute abdominopelvic pathology. 2.  Nonobstructing left renal calculi measuring up to 0.6 cm without hydroureteronephrosis.             Workstation performed: [id number]               Procedures  ECG 12 Lead Documentation Only    Date/Time: 8/16/2023 5:16 PM    Performed by: Tomer Mcgrath DO  Authorized by: Tomer Mcgrath DO    Indications / Diagnosis:  Lightheadedness  ECG reviewed by me, the ED Provider: yes    Patient location:  ED  Previous ECG:     Previous ECG:  Compared to current    Comparison ECG info:  6/3/2023    Similarity:  Changes noted  Interpretation:     Interpretation: non-specific    Rate:     ECG rate:  59    ECG rate assessment: bradycardic    Rhythm:     Rhythm: sinus bradycardia    Ectopy:     Ectopy: none    QRS:     QRS axis:  Normal    QRS intervals:  Normal  Conduction:     Conduction: abnormal      Abnormal conduction: 1st degree    ST segments:     ST segments:  Normal  T waves:     T waves: normal            ED Course  ED Course as of 08/16/23 1813   Wed Aug 16, 2023   1531 Pt with history of CRE, MDRO UTI, CKD, and prior nephrolithiasis presents with fatigue, lightheadedness, left flank pain, and dysuria x 3 days, fell three times (directly onto buttocks, no head strike or LOC) due to lightheadedness. Also c/o generalized body aches, pain in sacrum. Afebrile, VS WNL, pt requires assistance to walk. Exam with left CVA tenderness, suprapubic tenderness, is otherwise unremarkable. Review of outpatient labs(from today) and notes is concerning for an ANDRA, possible UTI, however cannot exclude nephrolithiasis, electrolyte abnormality. Will give IV fluids, obtain labs and imaging, reassess shortly. Will likely require admission for ANDRA. Joanie Dwyer, nephrology AP, is aware of patient, has already been at bedside to evaluate. 1630 Leukocytes, UA(!): Trace  Concerning for potential UTI, pending micro and culture. 1631 CBC and differential(!)  No leukocytosis. H/H within baseline range. 1634 CT renal stone study abdomen pelvis wo contrast  IMPRESSION:     1. No findings of acute abdominopelvic injury or acute abdominopelvic pathology. 2.  Nonobstructing left renal calculi measuring up to 0.6 cm without hydroureteronephrosis. 1635 POCT INR: 0.87   1635 PTT: 24   1636 LACTIC ACID: 1.1   1636 Does not meet SIRS or sepsis criteria. 1646 hs TnI 0hr: 6  Denies chest pain, hpwever will obtain 2 hour repeat. 1646 WBC, UA(!): 4-10   1741 Sodium(!): 128   1741 Creatinine(!): 4.21  ANDRA. Will admit. 1800 Accepted for full admission by Dr. Sera Peterson. Medical Decision Making  See ED course for MDM. ANDRA (acute kidney injury) ([address]): acute illness or injury  Hyponatremia: acute illness or injury  Amount and/or Complexity of Data Reviewed  Labs: ordered.  Decision-making details documented in ED Course. Radiology: ordered. Decision-making details documented in ED Course. Risk  Decision regarding hospitalization. Disposition  Final diagnoses:   History of UTI   ANDRA (acute kidney injury) ([address])   Hyponatremia     Time reflects when diagnosis was documented in both MDM as applicable and the Disposition within this note     Time User Action Codes Description Comment    8/16/2023  4:47 PM Irlanda Singletary Add [Z87.440] History of UTI     8/16/2023  5:46 PM Carla Erps [N17.9] ANDRA (acute kidney injury) ([address])     8/16/2023  5:46 PM Ellie Simran Add [E87.1] Hyponatremia     8/16/2023  5:46 PM Ellie Simran Modify [Z87.440] History of UTI     8/16/2023  5:46 PM Adelina House [N17.9] ANDRA (acute kidney injury) [facility])       ED Disposition     ED Disposition   Admit    Condition   Stable    Date/Time   Wed Aug 16, 2023  6:01 PM    Comment   Case was discussed with Dr. Sera Peterson and the patient's admission status was agreed to be Admission Status: inpatient status to the service of Dr. Sera Peterson . Follow-up Information    None         Patient's Medications   Discharge Prescriptions    No medications on file     No discharge procedures on file. PDMP Review       Value Time User    PDMP Reviewed  Yes 3/24/2023  9:59 PM Odalis Byrne MD           ED Provider  Attending physically available and evaluated Katherine Ho. I managed the patient along with the ED Attending.     Electronically Signed by         Ilir Snyder DO  08/16/23 0011

## 2023-08-16 NOTE — ASSESSMENT & PLAN NOTE
Lab Results   Component Value Date    HGBA1C 6.8 (H) 03/07/2023       No results for input(s): "POCGLU" in the last 72 hours.     Blood Sugar Average: Last 72 hrs:  Home medications insulin aspart 3 times daily before meals  16 units breakfast and lunch, 25 units at dinner  15 units detemir every evening  Diabetic diet  Sliding scale

## 2023-08-16 NOTE — ASSESSMENT & PLAN NOTE
Likely in the setting of acute infection, toxic metabolic encephalopathy due to to ANDRA  Advanced age  Patient currently not confused anymore  Delirium precautions  -Initiate delirium precautions  -maintain normal sleep/wake cycle  -minimize overnight interruptions, group overnight vitals/labs/nursing checks as possible  -dim lights, close blinds and turn off tv to minimize stimulation and encourage sleep environment in evenings  -StartTylenol 975mg Q8H prn  -monitor for fecal and urinary retention which may precipitate delirium  -encourage early mobilization and ambulation  -provide frequent reorientation and redirection  -encourage family and friends at the bedside to help help calm patient if anxious  -avoid medications which may precipitate or worsen delirium such as tramadol, benzodiazepine, anticholinergics, and benadryl  -encourage hydration and nutrition   -redirect unwanted behaviors as first line

## 2023-08-16 NOTE — H&P
[address] [facility]  H&P  Name: Joe Raymond 75 y.o. female I MRN: [id number]  Unit/Bed#: ABBEY I Date of Admission: 8/16/2023   Date of Service: 8/16/2023 I Hospital Day: 0      Assessment/Plan   * ANDRA (acute kidney injury) [facility])  Assessment & Plan  Lab Results   Component Value Date    CREATININE 4.21 (H) 08/16/2023    CREATININE 4.86 (H) 08/16/2023    CREATININE 2.72 (H) 08/09/2023       Lab Results   Component Value Date    EGFR 9 08/16/2023    EGFR 8 08/16/2023    EGFR 16 08/09/2023       • POA 4.21; (baseline baseline appears to be 1.8-2.3)  • UA:  Trace protein, 4-10 WBC  Imaging:  CT without contrast shows 8/16/2023 1. No findings of acute abdominopelvic injury or acute abdominopelvic pathology. 2.  Nonobstructing left renal calculi measuring up to 0.6 cm without hydroureteronephrosis. • Likely prerenal    Plan:  • Nephrology on board  • UA w/ microscopic, Urinary retention protocol, Bladder scan, Daily weights, I/O  •  IV Fluids: Isolyte 75 cc/h  • Monitor BMP daily and observe for downward trend of creatinine  • Avoid hypoperfusion of the kidneys, minimize nephrotoxins  RAAS Blockers/Diuretics held: Holding torsemide, olmesartan.       Postural dizziness with presyncope  Assessment & Plan  Patient notes some lightheadedness  Patient given 1 L in the emergency department  Measure orthostatic blood pressures  Continue IV fluid    Acute cystitis without hematuria  Assessment & Plan  Appears to have recurrent UTIs  5/23 patient had Enterobacter cloacae-CRE, susceptible to cefepime, ciprofloxacin, gentamicin, levofloxacin, meropenem  6/23 grew lactobacillus cloacae no sensitivity studies noted  8/9 was noted to have Serratia marcescens- ESBL  Recently patient had Serratia marcescens, on cefdinir by urologist, started on 8/14  Patient has no improvement of symptoms  Will start ertapenem  ID consult    AMS (altered mental status)  Assessment & Plan  Likely in the setting of acute infection, toxic metabolic encephalopathy due to to ANDRA  Advanced age  Patient currently not confused anymore  Delirium precautions  -Initiate delirium precautions  -maintain normal sleep/wake cycle  -minimize overnight interruptions, group overnight vitals/labs/nursing checks as possible  -dim lights, close blinds and turn off tv to minimize stimulation and encourage sleep environment in evenings  -StartTylenol 975mg Q8H prn  -monitor for fecal and urinary retention which may precipitate delirium  -encourage early mobilization and ambulation  -provide frequent reorientation and redirection  -encourage family and friends at the bedside to help help calm patient if anxious  -avoid medications which may precipitate or worsen delirium such as tramadol, benzodiazepine, anticholinergics, and benadryl  -encourage hydration and nutrition   -redirect unwanted behaviors as first line      Hyponatremia  Assessment & Plan  Recent Labs     08/16/23  0703 08/16/23  1701   SODIUM 133* 128*     No results found for: "OSMOUA", "Magda Journey", "OSMOLALITSER"    Workup:  · Hypovolemic/volume depletion  · Likely  hyponatremia  Received 1 L sodium chloride bolus in the emergency department  Plan:   · Continue Isolyte 75 ml per hour  Recheck in the a.m.       Anemia  Assessment & Plan  Recent Labs     08/16/23  1611   HGB 11.1*   MCV 86   7/5, ferritin 7, iron sat 8, TIBC 426, iron 26 some component of DOMI  Some component of CKD      Plan:  • Monitor CBC every day  • Transfuse if Hb < 7  • Monitor for now      Hypothyroid  Assessment & Plan  8/16 recent TSH 0.89  Continue levothyroxine 112 mcg daily    Essential hypertension  Assessment & Plan  Patient takes amlodipine 5 mg daily, olmesartan 40 mg daily, torsemide 5 mg daily, metoprolol 50 mg daily  Hold on losartan and torsemide as noted above      Type 2 diabetes mellitus with diabetic chronic kidney disease [facility])  Assessment & Plan  Lab Results   Component Value Date    HGBA1C 6.8 (H) 03/07/2023       No results for input(s): "POCGLU" in the last 72 hours. Blood Sugar Average: Last 72 hrs:  Home medications insulin aspart 3 times daily before meals  16 units breakfast and lunch, 25 units at dinner  15 units detemir every evening  Diabetic diet  Sliding scale      Hypertriglyceridemia  Assessment & Plan  Continue rosuvastatin 5 mg daily    Stage 4 chronic kidney disease [facility])  Assessment & Plan  Lab Results   Component Value Date    EGFR 9 08/16/2023    EGFR 8 08/16/2023    EGFR 16 08/09/2023    CREATININE 4.21 (H) 08/16/2023    CREATININE 4.86 (H) 08/16/2023    CREATININE 2.72 (H) 08/09/2023   Nephrology on board  Plan noted above under ANDRA        VTE Pharmacologic Prophylaxis: VTE Score: 4 Moderate Risk (Score 3-4) - Pharmacological DVT Prophylaxis Ordered: heparin. Code Status: Level 3 - DNAR and DNI   Discussion with family: Updated  () at bedside. Anticipated Length of Stay: Patient will be admitted on an inpatient basis with an anticipated length of stay of greater than 2 midnights secondary to ESBL UTI, ANDRA. Chief Complaint: Elevated creatinine    History of Present Illness:  Jud Morrison is a 75 y.o. female with a PMH of CKD stage III 4, hypertension, type 2 diabetes, hypertension, hypothyroid, altered mental status who presents with recurrent UTI. Elevated creatinine. Patient recently was noted to have a UTI with ESBL bacteria was prescribed cefdinir on 8/14. Did not relieve symptoms. Due to infection patient had decreased oral intake. And altered mental status noted no improvement of symptoms. Today patient had creatinine of 4.7. Baseline of 2. Advised to go to the emergency department  In the emergency department patient had no abnormalities on CT scan. Patient will be admitted admitted for ANDRA and UTI with ESBL    Review of Systems:  Review of Systems   Constitutional: Negative for chills and fever. HENT: Negative for ear pain and sore throat.     Eyes: Negative for pain and visual disturbance. Respiratory: Negative for cough and shortness of breath. Cardiovascular: Negative for chest pain and palpitations. Gastrointestinal: Negative for abdominal pain and vomiting. Genitourinary: Negative for dysuria and hematuria. Musculoskeletal: Negative for arthralgias and back pain. Skin: Negative for color change and rash. Neurological: Negative for seizures and syncope. All other systems reviewed and are negative.       Past Medical and Surgical History:   Past Medical History:   Diagnosis Date   • Actinic keratosis    • Acute cystitis without hematuria 04/28/2023   • Acute-on-chronic kidney injury [facility])    • ANDRA (acute kidney injury) ([address]) 05/08/2020   • Allergic    • Allergic rhinitis    • Anesthesia     pt reports "had to use double lumen endo tube for hiatal hernia repair/so surgeon could get to where he needed to work"   • Arthritis    • Aspiration into airway    • Ahumada esophagus 1993    Lot of stress with children   • Basal cell carcinoma 2007    left cheek    • BCC (basal cell carcinoma) 05/27/2021    Left Nasal tip   • Breast discharge 6/19/2023   • Breast pain 6/19/2023   • Cancer ([address])     squamous cell cancer on forhead   • Cancer ([address])     basal cell on nose    • Cholelithiasis    • Chronic kidney disease 2000, 2018    Stones, kidney disease stage 4   • Chronic pain disorder     bilat feet and joint pain on occas   • Colon polyp    • Constipation    • COPD (chronic obstructive pulmonary disease) ([address])    • COVID-19 08/2021    recovered at home/did receive monoclonal infusion   • COVID-19 virus infection 08/16/2021   • Dental bridge present    • Depression    • Diabetes mellitus ([address])     Type 2   • Diabetic neuropathy ([address])    • Disease of thyroid gland    • Dyspnea    • Elevated liver enzymes 4/4/2023   • Epigastric abdominal pain with severe diabetic gastroparesis, status post EGD/Botox injection, 5/14 5/17/2021   • Facial abrasion, initial encounter 3/22/2023   • Fall 03/22/2023   • Family history of thyroid problem    • Fatty liver    • Fracture of orbital floor, blow-out, right, closed, with routine healing, subsequent encounter 3/22/2023   • GERD (gastroesophageal reflux disease)    • Heart burn    • Hiatal hernia    • History of colon polyps 7/30/2021   • History of kidney stones 9/29/2020    Hx of recurrent kidney stones   • History of kidney stones 9/29/2020    Hx of recurrent kidney stones  Formatting of this note might be different from the original. Hx of recurrent kidney stones  Last Assessment & Plan:  Formatting of this note might be different from the original. We will set her up for follow-up in 3 months with a KUB prior. She knows to call if she has any kidney stone type pain in the meantime. • History of pneumonia    • History of repair of hiatal hernia 5/3/2020   • Hyperlipidemia    • Hyperplasia, parathyroid ([address])    • Hypertension    • Hypotension 04/13/2022   • Kidney problem    • Kidney stone    • Memory loss Julu2 2020   • Motion sickness    • Motion sickness    • Multiple closed fractures of ribs of right side 3/22/2023   • Nasal bones, closed fracture 3/22/2023   • Neck pain    • Obesity 1978   • Obesity (BMI 30.0-34. 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, left     great toe fusion   • Urinary incontinence    • Urinary tract infection 03/28/2022   • Wears glasses        Past Surgical History:   Procedure Laterality Date   • ABDOMINAL SURGERY  [id number]    Nissen x2 1972 tubal ligarion   • ARTHROSCOPY KNEE     • BREAST BIOPSY      stereotactic-benign   • BREAST BIOPSY      stereo-benign   • BREAST CYST EXCISION Bilateral benign   • BREAST EXCISIONAL BIOPSY      unknown date-benign   • BREAST EXCISIONAL BIOPSY      unknown date-benign   • BREAST EXCISIONAL BIOPSY      unknown date-benign   • BREAST EXCISIONAL BIOPSY      unknown age-benign   • CARDIAC CATHETERIZATION     • CHOLECYSTECTOMY     • COLONOSCOPY     • EXAMINATION UNDER ANESTHESIA N/A 06/24/2021    Procedure: EXAM UNDER ANESTHESIA (EUA), DISE;  Surgeon: Master Tom MD;  [facility];  Service: ENT   • HERNIA REPAIR     • HIATAL HERNIA REPAIR     • KNEE SURGERY      Torn maniscus lap surg   • LIPOSUCTION     • LYMPH NODE BIOPSY     • MOHS SURGERY  05/20/2021    left mid forehead-Gautam   • MOHS SURGERY Left 05/27/2021    Left nasal tip- gautam    • NY LIGATION/BIOPSY TEMPORAL ARTERY Left 01/05/2023    Procedure: BIOPSY ARTERY TEMPORAL;  Surgeon: Karel Connor DO;  [facility];  Service: Vascular   • NY OPEN IMPLANTATION CRANIAL NERVE VASQUEZ & PULSE GEN N/A 11/10/2021    Procedure: INSERTION UPPER AIRWAY STIMULATOR, INSPIRE IMPLANT;  Surgeon: Master Tom MD;  [facility];  Service: ENT   • NY UNLISTED PROCEDURE FOOT/TOES Right 07/19/2022    Procedure: 1st metatarsal phalangeal joint fusion;  Surgeon: Render Essex, DPM;  [facility];  Service: Podiatry   • REDUCTION MAMMAPLASTY Bilateral 2000   • REDUCTION MAMMAPLASTY     • SKIN BIOPSY     • SKIN CANCER EXCISION  2007    squamous cell carcinoma    • SKIN CANCER EXCISION  2007    basal cell carcinoma   • SQUAMOUS CELL CARCINOMA EXCISION     • TOE SURGERY Right 08/04/2022    Right Great Toe Fusion   • TONSILLECTOMY     • TUBAL LIGATION     • UPPER GASTROINTESTINAL ENDOSCOPY     • US GUIDED BREAST BIOPSY RIGHT COMPLETE Right 07/18/2022       Meds/Allergies:  Prior to Admission medications    Medication Sig Start Date End Date Taking?  Authorizing Provider   acetaminophen (TYLENOL) 325 mg tablet Take 3 tablets (975 mg total) by mouth every 8 (eight) hours 3/23/23   Ricky Rivera MILTON Murcia   albuterol (Ventolin HFA) 90 mcg/act inhaler Inhale 2 puffs every 6 (six) hours as needed for wheezing 8/4/22   Toshia Blanc DO   amLODIPine (NORVASC) 5 mg tablet Take 1 tablet (5 mg total) by mouth daily 5/24/23   Alayna Bolden MD   b complex vitamins capsule Take 1 capsule by mouth daily  Patient not taking: Reported on 5/24/2023    Historical Provider, MD CARREON ULTRAFINE III SHORT PEN 31G X 8 MM MISC  7/26/19   Historical Provider, MD   Calcium Citrate 1040 MG TABS Take 500 mg by mouth Three times a day 11/16/22   Historical Provider, MD   cefdinir (OMNICEF) 300 mg capsule Take 1 capsule (300 mg total) by mouth every 12 (twelve) hours for 5 days 8/14/23 8/19/23  Serene Guerrero PA-C   Coenzyme Q10 (CoQ10) 100 MG CAPS Take 100 mg by mouth in the morning    Historical Provider, MD   Cranberry 200 MG CAPS Take 200 capsules by mouth 3 (three) times a day    Historical Provider, MD   DULoxetine (CYMBALTA) 30 mg delayed release capsule Take 60 mg by mouth daily 9/7/22   Historical Provider, MD   estradiol (ESTRACE VAGINAL) 0.1 mg/g vaginal cream Apply pea sized amount around urethra and inside vaginal opening 3 times weekly 4/12/23   Brian Castellano PA-C   famotidine (PEPCID) 40 MG tablet Take 1 tablet (40 mg total) by mouth daily 8/7/23   Coco Rick MD   fluconazole (DIFLUCAN) 200 mg tablet Take 1 tablet (200 mg total) by mouth daily for 14 days 8/7/23 8/21/23  Coco Rick MD   gabapentin (Neurontin) 300 mg capsule Take 1 capsule (300 mg total) by mouth daily at bedtime 3/30/23   Libby Connor DO   Icosapent Ethyl 1 g CAPS Take 1 capsule by mouth 2 (two) times a day 4/4/23   Historical Provider, MD   insulin aspart (NovoLOG) 100 units/mL injection Inject under the skin 3 (three) times a day before meals 16 units, 16 units, 25 units.     Historical Provider, MD   insulin detemir (Levemir FlexTouch) 100 Units/mL injection pen Inject 50 Units under the skin every evening  6/8/21 Historical Provider, MD   levothyroxine 112 mcg tablet Take 112 mcg by mouth every morning 23   Historical Provider, MD   metoprolol tartrate (LOPRESSOR) 50 mg tablet Take 1 tablet (50 mg total) by mouth every 12 (twelve) hours 23   TC Jaime   olmesartan (BENICAR) 40 mg tablet TAKE 1 TABLET BY MOUTH EVERY DAY 8/15/23   Liam Marc MD   pantoprazole (PROTONIX) 40 mg tablet TAKE 1 TABLET BY MOUTH TWICE A DAY 10/30/22   Mikala Mccarthy PA-C   pramipexole (MIRAPEX) 0.25 mg tablet Take 1 tablet (0.25 mg total) by mouth daily at bedtime 2/15/23   TC Benitez   Probiotic Product (PROBIOTIC PO) Take 1 capsule by mouth 2 (two) times a day    Historical Provider, MD   rosuvastatin (CRESTOR) 5 mg tablet Take 1 tablet (5 mg total) by mouth daily 23   Vera Greco MD   torsemide [facility]) 10 mg tablet Take 0.5 tablets (5 mg total) by mouth daily 23  Angeles Bland MD     I have reviewed home medications with patient personally. Allergies:    Allergies   Allergen Reactions   • Sulfamethoxazole-Trimethoprim Other (See Comments)     Tongue swelling   • Ciprofloxacin Hives and Itching       Social History:  Marital Status: /Civil Union   Occupation: none  Patient Pre-hospital Living Situation: Home  Patient Pre-hospital Level of Mobility: walks  Patient Pre-hospital Diet Restrictions: none  Substance Use History:   Social History     Substance and Sexual Activity   Alcohol Use Yes    Comment: 1 or 2 a year     Social History     Tobacco Use   Smoking Status Former   • Packs/day: 2.00   • Years: 10.00   • Total pack years: 20.00   • Types: Cigarettes   • Start date: 1968   • Quit date: 1976   • Years since quittin.6   • Passive exposure: Past   Smokeless Tobacco Never   Tobacco Comments    Smoked 2 pack a day     Social History     Substance and Sexual Activity   Drug Use No       Family History:  Family History   Problem Relation Age of Onset   • Heart disease Mother    • Depression Mother    • Hypertension Mother    • COPD Mother    • Hearing loss Mother    • Anxiety disorder Mother    • Heart disease Father    • Lung cancer Father 79        Smoker    • Cancer Father         brain   • Alcohol abuse Father    • Dementia Father    • Hypertension Father    • Thyroid disease Father    • COPD Father    • Arthritis Father    • Brain cancer Father 76   • Hypertension Sister    • Diabetes Sister    • Heart disease Sister    • Thyroid disease Sister    • Cancer Sister         Lympoma, lung   • Lung cancer Sister 78   • Anxiety disorder Sister    • Hypertension Brother    • Diabetes Brother    • Cancer Brother         Throat   • Dementia Brother    • Stroke Brother    • Hypertension Brother    • Heart disease Brother    • Diabetes Brother    • Hypertension Brother    • No Known Problems Son    • No Known Problems Son    • Brain cancer Paternal Aunt         unknown age   • Diabetes Sister    • Hypertension Sister    • Diabetes Brother    • Breast cancer Neg Hx        Physical Exam:     Vitals:   Blood Pressure: 118/72 (08/16/23 1352)  Pulse: 68 (08/16/23 1352)  Temperature: 97.6 °F (36.4 °C) (08/16/23 1352)  Temp Source: Oral (08/16/23 1352)  Respirations: 18 (08/16/23 1352)  SpO2: 99 % (08/16/23 1352)    Physical Exam  Vitals and nursing note reviewed. Constitutional:       General: She is not in acute distress. Appearance: She is well-developed. She is obese. Comments: Patient appears dry   HENT:      Head: Normocephalic and atraumatic. Nose: Nose normal.      Mouth/Throat:      Mouth: Mucous membranes are moist.   Eyes:      Conjunctiva/sclera: Conjunctivae normal.   Cardiovascular:      Rate and Rhythm: Normal rate and regular rhythm. Heart sounds: No murmur heard. Pulmonary:      Effort: Pulmonary effort is normal. No respiratory distress. Breath sounds: Normal breath sounds. Abdominal:      Palpations: Abdomen is soft. Tenderness: There is no abdominal tenderness. Musculoskeletal:      Cervical back: Neck supple. Right lower leg: No edema. Left lower leg: No edema. Skin:     General: Skin is warm and dry. Capillary Refill: Capillary refill takes less than 2 seconds. Neurological:      General: No focal deficit present. Mental Status: She is alert and oriented to person, place, and time. Comments: Oriented x4   Psychiatric:         Mood and Affect: Mood normal.          Additional Data:     Lab Results:  Results from last 7 days   Lab Units 08/16/23  1611   WBC Thousand/uL 9.67   HEMOGLOBIN g/dL 11.1*   HEMATOCRIT % 34.3*   PLATELETS Thousands/uL 269   NEUTROS PCT % 64   LYMPHS PCT % 25   MONOS PCT % 8   EOS PCT % 3     Results from last 7 days   Lab Units 08/16/23  1701   SODIUM mmol/L 128*   POTASSIUM mmol/L 5.1   CHLORIDE mmol/L 97   CO2 mmol/L 21   BUN mg/dL 85*   CREATININE mg/dL 4.21*   ANION GAP mmol/L 10   CALCIUM mg/dL 8.9   ALBUMIN g/dL 4.0   TOTAL BILIRUBIN mg/dL 0.28   ALK PHOS U/L 58   ALT U/L 14   AST U/L 17   GLUCOSE RANDOM mg/dL 216*     Results from last 7 days   Lab Units 08/16/23  1611   INR  0.87             Results from last 7 days   Lab Units 08/16/23  1611   LACTIC ACID mmol/L 1.1       Lines/Drains:  Invasive Devices     Peripheral Intravenous Line  Duration           Peripheral IV 08/16/23 Left Antecubital <1 day                    Imaging: Reviewed radiology reports from this admission including: chest xray  CT renal stone study abdomen pelvis wo contrast   Final Result by Luis Regan MD (08/16 1631)      1. No findings of acute abdominopelvic injury or acute abdominopelvic pathology. 2.  Nonobstructing left renal calculi measuring up to 0.6 cm without hydroureteronephrosis. Workstation performed: [id number]             EKG and Other Studies Reviewed on Admission:   · EKG: NSR.  HR 80.    ** Please Note: This note has been constructed using a voice recognition system.  **

## 2023-08-16 NOTE — ED ATTENDING ATTESTATION
8/16/2023  Chely Eaton DO, saw and evaluated the patient. I have discussed the patient with the resident/non-physician practitioner and agree with the resident's/non-physician practitioner's findings, Plan of Care, and MDM as documented in the resident's/non-physician practitioner's note, except where noted. All available labs and Radiology studies were reviewed. I was present for key portions of any procedure(s) performed by the resident/non-physician practitioner and I was immediately available to provide assistance. At this point I agree with the current assessment done in the Emergency Department. I have conducted an independent evaluation of this patient a history and physical is as follows:    76-year-old female with past medical history significant for hypertension, hyperlipidemia, hypothyroidism presents to the ED for evaluation of generalized weakness and possible UTI. Patient's  provides some history with this. States that the patient has urinary tract infection and that she has decreased oral intake and generalized fatigue. Symptoms have been ongoing for the past few days. Symptoms mild to moderate severity. No known provoking palliating factors. Was brought to the ED today by nephrology. Patient has history of MDRO UTI. On my evaluation, patient is resting comfortably no acute distress, abdomen soft nontender nondistended, lungs clear to auscultation in all fields, normal heart sounds, 5 out of 5 muscle strength in all extremities, grossly no sensation light touch all extremities. Will obtain labs, urine, CT abdomen pelvis to better evaluate. Labs show hyponatremia 128, impaired renal function with creatinine of 4.21 compared to earlier in the month until last month when patient had a creatinine of 2.34 to 2.72. No leukocytosis, stable hemoglobin, lactic acid 1.1, urine is without concern for acute infection.   Nephrology saw the patient in the emergency department and requested that we place consult order for them. Plan will be for admission to the hospital.  Admitting team consulted. Accepted onto their service for further care and management.   Stable at the time of disposition    ED Course         Critical Care Time  Procedures

## 2023-08-16 NOTE — ASSESSMENT & PLAN NOTE
Lab Results   Component Value Date    EGFR 9 08/16/2023    EGFR 8 08/16/2023    EGFR 16 08/09/2023    CREATININE 4.21 (H) 08/16/2023    CREATININE 4.86 (H) 08/16/2023    CREATININE 2.72 (H) 08/09/2023   Nephrology on board  Plan noted above under ANDRA

## 2023-08-16 NOTE — ASSESSMENT & PLAN NOTE
Patient notes some lightheadedness  Patient given 1 L in the emergency department  Measure orthostatic blood pressures  Continue IV fluid

## 2023-08-16 NOTE — ASSESSMENT & PLAN NOTE
Lab Results   Component Value Date    CREATININE 4.21 (H) 08/16/2023    CREATININE 4.86 (H) 08/16/2023    CREATININE 2.72 (H) 08/09/2023       Lab Results   Component Value Date    EGFR 9 08/16/2023    EGFR 8 08/16/2023    EGFR 16 08/09/2023       • POA 4.21; (baseline baseline appears to be 1.8-2.3)  • UA:  Trace protein, 4-10 WBC  Imaging:  CT without contrast shows 8/16/2023 1. No findings of acute abdominopelvic injury or acute abdominopelvic pathology. 2.  Nonobstructing left renal calculi measuring up to 0.6 cm without hydroureteronephrosis. • Likely prerenal    Plan:  • Nephrology on board  • UA w/ microscopic, Urinary retention protocol, Bladder scan, Daily weights, I/O  •  IV Fluids: Isolyte 75 cc/h  • Monitor BMP daily and observe for downward trend of creatinine  • Avoid hypoperfusion of the kidneys, minimize nephrotoxins  RAAS Blockers/Diuretics held: Holding torsemide, olmesartan.

## 2023-08-16 NOTE — CONSULTS
[facility] 75 y.o. female MRN: [id number]  Unit/Bed#: ED-03 Encounter: [id number]    ASSESSMENT and PLAN:  Acute kidney injury (POA):  · Baseline creatinine 1.5-2  · 8/16 creatinine up to 4.86. Patient sent to the hospital by nephrology  · Recently started on antibiotic for UTI which is recurrent. She was started on cefdinir which was placed on hold also by Dr. Domonique Lyles. Also recent bump in creatinine to the upper twos and spironolactone was placed on hold with follow-up labs which were done today. · Etiology: Strongly suspect prerenal due to decreased oral intake, diuretic, UTI in the setting of altered renal hemodynamics due to olmesartan use. Blood pressure lower than usual/possible relative hypotension. · Plan/recommendations:  · Check CMP, CBC, lactic acid  · Check urinalysis with reflex to microscopic and reflex to culture  · Holding olmesartan, diuretic  · Check labs in the a.m.  · IV fluids: Receiving a bolus of normal saline in the emergency room  · Follow with Plasma-Lyte 75 mL/h depending on response to normal saline  · Monitor intake and output  · Neurogenic bladder-bladder scan every shift urinary retention protocol    Chronic kidney disease, stage IV:  · Etiology: Diabetic nephropathy, hypertensive nephrosclerosis, arteriolar nephrosclerosis, chronic NSAID use. · Follows with Dr. Domonique Lyles  · Last seen in the nephrology clinic June 2023. Baseline creatinine 1.5 to 2 with occasional bumps into the low twos  · Recent creatinine up to 2.7 on 8/9 and spironolactone was discontinued by Dr. Domonique Lyles  · Patient reports being started on antibiotic for UTI. Placed on cefdinir. Last urine culture on 8/9 shows greater than 100,000 CFU/mL Serratia. · Prior UPCR 2.12. Appears to be in the setting of UTI therefore plan was to repeat at steady state. No M spike on previous immunofixation  · Prior work-up unrevealing    Recurrent UTI:  · History of neurogenic bladder.   Does intermittent straight caths at home  · Recently started on cefdinir for urine culture positive for Serratia  · Just in the last few months had 5 positive urine cultures    Hypertension/volume:  · Blood pressure 118/72. Generally her blood pressure runs higher. From review of outpatient records frequent blood pressure readings of 160/80. · Home medications: Amlodipine 5 mg daily, olmesartan 40 mg daily, torsemide, metoprolol tartrate 50 mg every 12 hours. Previously on spironolactone alone which was discontinued as of 8/10/2023 in the setting of increasing creatinine and increasing potassium. · Currently olmesartan, torsemide on hold  · Lower blood pressure readings likely due to volume depletion. Mild hyponatremia: Likely due to volume depletion. Start isotonic IV fluids. Will monitor at this time. Iron deficiency anemia:  · Last hemoglobin 11.2 in June 2023  · Iron studies in July 2023: Iron saturation 8%, ferritin 7    Hypothyroidism:  · Managed by PCP  · Recent elevated TSH, normal T4  · On levothyroxine    Distant history of nephrolithiasis:   · Reporting some flank left pain. CT without contrast pending. Recurrent falls. · Weakness. Feels like her knees are giving out and she has been falling  · Injured coccyx during fall yesterday with residual pain  · CT pending    Hypertriglyceridemia:  · Triglycerides 476    GERD: On PPI twice daily      SUMMARY OF RECOMMENDATIONS:  Holding olmesartan and diuretic. IV fluids. Likely prerenal azotemia due to decreased intake/diuretic/olmesartan.   Recheck labs    HISTORY OF PRESENT ILLNESS:  Requesting Physician: Leslee Favre, DO  Reason for Consult: Acute kidney injury on CKD    Jia Khanna is a 75 y.o. female with a past medical history of CKD 4, neurogenic bladder straight caths daily, recurrent UTIs, nephrolithiasis, GERD, hypertension, hyperlipidemia, hypothyroidism, iron deficiency anemia who was admitted to [facility] after presenting with abnormal outpatient labs. Due to he was sent to the emergency room by her nephrologist, Dr. Wandy Cornejo due to bump in creatinine significantly above baseline. Patient was seen in the emergency room. She reports falling recently several times due to weakness. She recently was diagnosed with another UTI and started on cefdinir but only took 1 dose on 8/15 and was instructed to stop medication today. No fevers or chills. She does report left flank pain. When she fell yesterday she injured her coccyx area which still is bothering her. Her  is in attendance and helps to supply some information regarding medications and HPI. He reports that she was not drinking well and sleeping a great deal.  Her symptoms began last week. A renal consultation is requested today for assistance in the management of acute kidney injury on CKD.     PAST MEDICAL HISTORY:  Past Medical History:   Diagnosis Date   • Actinic keratosis    • Acute cystitis without hematuria 04/28/2023   • Acute-on-chronic kidney injury [facility])    • ANDRA (acute kidney injury) ([address]) 05/08/2020   • Allergic    • Allergic rhinitis    • Anesthesia     pt reports "had to use double lumen endo tube for hiatal hernia repair/so surgeon could get to where he needed to work"   • Arthritis    • Aspiration into airway    • Ahumada esophagus 1993    Lot of stress with children   • Basal cell carcinoma 2007    left cheek    • BCC (basal cell carcinoma) 05/27/2021    Left Nasal tip   • Breast discharge 6/19/2023   • Breast pain 6/19/2023   • Cancer ([address])     squamous cell cancer on forhead   • Cancer ([address])     basal cell on nose    • Cholelithiasis    • Chronic kidney disease 2000, 2018    Stones, kidney disease stage 4   • Chronic pain disorder     bilat feet and joint pain on occas   • Colon polyp    • Constipation    • COPD (chronic obstructive pulmonary disease) ([address])    • COVID-19 08/2021    recovered at home/did receive monoclonal infusion   • COVID-19 virus infection 08/16/2021   • Dental bridge present    • Depression    • Diabetes mellitus ([address])     Type 2   • Diabetic neuropathy (HCC)    • Disease of thyroid gland    • Dyspnea    • Elevated liver enzymes 4/4/2023   • Epigastric abdominal pain with severe diabetic gastroparesis, status post EGD/Botox injection, 5/14 5/17/2021   • Facial abrasion, initial encounter 3/22/2023   • Fall 03/22/2023   • Family history of thyroid problem    • Fatty liver    • Fracture of orbital floor, blow-out, right, closed, with routine healing, subsequent encounter 3/22/2023   • GERD (gastroesophageal reflux disease)    • Heart burn    • Hiatal hernia    • History of colon polyps 7/30/2021   • History of kidney stones 9/29/2020    Hx of recurrent kidney stones   • History of kidney stones 9/29/2020    Hx of recurrent kidney stones  Formatting of this note might be different from the original. Hx of recurrent kidney stones  Last Assessment & Plan:  Formatting of this note might be different from the original. We will set her up for follow-up in 3 months with a KUB prior. She knows to call if she has any kidney stone type pain in the meantime. • History of pneumonia    • History of repair of hiatal hernia 5/3/2020   • Hyperlipidemia    • Hyperplasia, parathyroid ([address])    • Hypertension    • Hypotension 04/13/2022   • Kidney problem    • Kidney stone    • Memory loss Julu2 2020   • Motion sickness    • Motion sickness    • Multiple closed fractures of ribs of right side 3/22/2023   • Nasal bones, closed fracture 3/22/2023   • Neck pain    • Obesity 1978   • Obesity (BMI 30.0-34. 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, left great toe fusion   • Urinary incontinence    • Urinary tract infection 03/28/2022   • Wears glasses        PAST SURGICAL HISTORY:  Past Surgical History:   Procedure Laterality Date   • ABDOMINAL SURGERY  [id number]    Nissen x2 1972 tubal ligarion   • ARTHROSCOPY KNEE     • BREAST BIOPSY      stereotactic-benign   • BREAST BIOPSY      stereo-benign   • BREAST CYST EXCISION Bilateral     benign   • BREAST EXCISIONAL BIOPSY      unknown date-benign   • BREAST EXCISIONAL BIOPSY      unknown date-benign   • BREAST EXCISIONAL BIOPSY      unknown date-benign   • BREAST EXCISIONAL BIOPSY      unknown age-benign   • CARDIAC CATHETERIZATION     • CHOLECYSTECTOMY     • COLONOSCOPY     • EXAMINATION UNDER ANESTHESIA N/A 06/24/2021    Procedure: EXAM UNDER ANESTHESIA (EUA), DISE;  Surgeon: Stacy Rausch MD;  [facility];  Service: ENT   • HERNIA REPAIR     • HIATAL HERNIA REPAIR     • KNEE SURGERY      Torn maniscus lap surg   • LIPOSUCTION     • LYMPH NODE BIOPSY     • MOHS SURGERY  05/20/2021    left mid forehead-Gautam   • MOHS SURGERY Left 05/27/2021    Left nasal tip- gautam    • LA LIGATION/BIOPSY TEMPORAL ARTERY Left 01/05/2023    Procedure: BIOPSY ARTERY TEMPORAL;  Surgeon: Kedar Long DO;  [facility];  Service: Vascular   • LA OPEN IMPLANTATION CRANIAL NERVE VASQUEZ & PULSE GEN N/A 11/10/2021    Procedure: INSERTION UPPER AIRWAY STIMULATOR, INSPIRE IMPLANT;  Surgeon: Stacy Rausch MD;  [facility];  Service: ENT   • LA UNLISTED PROCEDURE FOOT/TOES Right 07/19/2022    Procedure: 1st metatarsal phalangeal joint fusion;  Surgeon: Sergo Doshi DPM;  [facility];  Service: Podiatry   • REDUCTION MAMMAPLASTY Bilateral 2000   • REDUCTION MAMMAPLASTY     • SKIN BIOPSY     • SKIN CANCER EXCISION  2007    squamous cell carcinoma    • SKIN CANCER EXCISION  2007    basal cell carcinoma   • SQUAMOUS CELL CARCINOMA EXCISION     • TOE SURGERY Right 08/04/2022    Right Great Toe Fusion   • TONSILLECTOMY     • TUBAL LIGATION     • UPPER GASTROINTESTINAL ENDOSCOPY     • US GUIDED BREAST BIOPSY RIGHT COMPLETE Right 2022       ALLERGIES:  Allergies   Allergen Reactions   • Sulfamethoxazole-Trimethoprim Other (See Comments)     Tongue swelling   • Ciprofloxacin Hives and Itching       SOCIAL HISTORY:  Social History     Substance and Sexual Activity   Alcohol Use Yes    Comment: 1 or 2 a year     Social History     Substance and Sexual Activity   Drug Use No     Social History     Tobacco Use   Smoking Status Former   • Packs/day: 2.00   • Years: 10.00   • Total pack years: 20.00   • Types: Cigarettes   • Start date: 1968   • Quit date: 1976   • Years since quittin.6   • Passive exposure: Past   Smokeless Tobacco Never   Tobacco Comments    Smoked 2 pack a day       FAMILY HISTORY:  Family History   Problem Relation Age of Onset   • Heart disease Mother    • Depression Mother    • Hypertension Mother    • COPD Mother    • Hearing loss Mother    • Anxiety disorder Mother    • Heart disease Father    • Lung cancer Father 79        Smoker    • Cancer Father         brain   • Alcohol abuse Father    • Dementia Father    • Hypertension Father    • Thyroid disease Father    • COPD Father    • Arthritis Father    • Brain cancer Father 76   • Hypertension Sister    • Diabetes Sister    • Heart disease Sister    • Thyroid disease Sister    • Cancer Sister         Lympoma, lung   • Lung cancer Sister 78   • Anxiety disorder Sister    • Hypertension Brother    • Diabetes Brother    • Cancer Brother         Throat   • Dementia Brother    • Stroke Brother    • Hypertension Brother    • Heart disease Brother    • Diabetes Brother    • Hypertension Brother    • No Known Problems Son    • No Known Problems Son    • Brain cancer Paternal Aunt         unknown age   • Diabetes Sister    • Hypertension Sister    • Diabetes Brother    • Breast cancer Neg Hx        MEDICATIONS:    Current Facility-Administered Medications:   •  sodium chloride 0.9 % bolus 1,000 mL, 1,000 mL, Intravenous, Once, Shaila Files, DO    Current Outpatient Medications:   •  acetaminophen (TYLENOL) 325 mg tablet, Take 3 tablets (975 mg total) by mouth every 8 (eight) hours, Disp: , Rfl: 0  •  albuterol (Ventolin HFA) 90 mcg/act inhaler, Inhale 2 puffs every 6 (six) hours as needed for wheezing, Disp: 54 g, Rfl: 0  •  amLODIPine (NORVASC) 5 mg tablet, Take 1 tablet (5 mg total) by mouth daily, Disp: 90 tablet, Rfl: 1  •  b complex vitamins capsule, Take 1 capsule by mouth daily (Patient not taking: Reported on 5/24/2023), Disp: , Rfl:   •  B-D ULTRAFINE III SHORT PEN 31G X 8 MM MISC, , Disp: , Rfl: 3  •  Calcium Citrate 1040 MG TABS, Take 500 mg by mouth Three times a day, Disp: , Rfl:   •  cefdinir (OMNICEF) 300 mg capsule, Take 1 capsule (300 mg total) by mouth every 12 (twelve) hours for 5 days, Disp: 10 capsule, Rfl: 0  •  Coenzyme Q10 (CoQ10) 100 MG CAPS, Take 100 mg by mouth in the morning, Disp: , Rfl:   •  Cranberry 200 MG CAPS, Take 200 capsules by mouth 3 (three) times a day, Disp: , Rfl:   •  DULoxetine (CYMBALTA) 30 mg delayed release capsule, Take 60 mg by mouth daily, Disp: , Rfl:   •  estradiol (ESTRACE VAGINAL) 0.1 mg/g vaginal cream, Apply pea sized amount around urethra and inside vaginal opening 3 times weekly, Disp: 42.5 g, Rfl: 2  •  famotidine (PEPCID) 40 MG tablet, Take 1 tablet (40 mg total) by mouth daily, Disp: 90 tablet, Rfl: 0  •  fluconazole (DIFLUCAN) 200 mg tablet, Take 1 tablet (200 mg total) by mouth daily for 14 days, Disp: 14 tablet, Rfl: 0  •  gabapentin (Neurontin) 300 mg capsule, Take 1 capsule (300 mg total) by mouth daily at bedtime, Disp: 90 capsule, Rfl: 0  •  Icosapent Ethyl 1 g CAPS, Take 1 capsule by mouth 2 (two) times a day, Disp: , Rfl:   •  insulin aspart (NovoLOG) 100 units/mL injection, Inject under the skin 3 (three) times a day before meals 16 units, 16 units, 25 units., Disp: , Rfl:   •  insulin detemir (Levemir FlexTouch) 100 Units/mL injection pen, Inject 50 Units under the skin every evening , Disp: , Rfl:   •  levothyroxine 112 mcg tablet, Take 112 mcg by mouth every morning, Disp: , Rfl:   •  metoprolol tartrate (LOPRESSOR) 50 mg tablet, Take 1 tablet (50 mg total) by mouth every 12 (twelve) hours, Disp: 180 tablet, Rfl: 3  •  olmesartan (BENICAR) 40 mg tablet, TAKE 1 TABLET BY MOUTH EVERY DAY, Disp: 90 tablet, Rfl: 1  •  pantoprazole (PROTONIX) 40 mg tablet, TAKE 1 TABLET BY MOUTH TWICE A DAY, Disp: 180 tablet, Rfl: 2  •  pramipexole (MIRAPEX) 0.25 mg tablet, Take 1 tablet (0.25 mg total) by mouth daily at bedtime, Disp: 90 tablet, Rfl: 2  •  Probiotic Product (PROBIOTIC PO), Take 1 capsule by mouth 2 (two) times a day, Disp: , Rfl:   •  rosuvastatin (CRESTOR) 5 mg tablet, Take 1 tablet (5 mg total) by mouth daily, Disp: 90 tablet, Rfl: 3  •  torsemide (DEMADEX) 10 mg tablet, Take 0.5 tablets (5 mg total) by mouth daily, Disp: 30 tablet, Rfl: 1    REVIEW OF SYSTEMS:  Constitutional: Positive for fatigue. Weakness. Does not report fevers or chills  HENT: Positive for congestion primarily nasal since she fell and broke her nose several months ago. She fell over the dog. Eyes: Negative for visual disturbance. Respiratory: Negative for cough, shortness of breath and wheezing. Cardiovascular: Positive for chest heaviness. Gastrointestinal: Negative for abdominal pain, constipation, diarrhea, nausea and vomiting. Genitourinary: Positive for dysuria  Musculoskeletal: No unusual arthralgia or myalgia but reports weakness. Frequent falls in the last several days. Knees seem to buckle  Skin: Negative for rash. Neurological: Negative for focal weakness. Denies dizziness  Hematological: Negative for easy bruising or bleeding. Psychiatric/Behavioral: Negative for confusion.  reports that she wants to sleep all day.   All the systems were reviewed and were negative except as documented on the HPI. PHYSICAL EXAM:  Current Weight:    First Weight:    Vitals:    08/16/23 1352   BP: 118/72   BP Location: Left arm   Pulse: 68   Resp: 18   Temp: 97.6 °F (36.4 °C)   TempSrc: Oral   SpO2: 99%     No intake or output data in the 24 hours ending 08/16/23 1544  Physical Exam  Constitutional:       General: She is not in acute distress. Appearance: She is well-developed. She is not ill-appearing, toxic-appearing or diaphoretic. HENT:      Head: Normocephalic and atraumatic. Nose: No congestion. Mouth/Throat:      Mouth: Mucous membranes are dry. Eyes:      General:         Right eye: No discharge. Left eye: No discharge. Extraocular Movements: Extraocular movements intact. Conjunctiva/sclera: Conjunctivae normal.   Neck:      Thyroid: No thyromegaly. Vascular: No carotid bruit or JVD. Trachea: No tracheal deviation. Cardiovascular:      Rate and Rhythm: Normal rate and regular rhythm. Heart sounds: No murmur heard. No friction rub. No gallop. Pulmonary:      Effort: Pulmonary effort is normal. No respiratory distress. Breath sounds: Normal breath sounds. No stridor. No wheezing, rhonchi or rales. Abdominal:      General: Bowel sounds are normal. There is no distension. Palpations: Abdomen is soft. There is no mass. Tenderness: There is no abdominal tenderness. There is no guarding or rebound. Musculoskeletal:         General: No swelling, tenderness or signs of injury. Normal range of motion. Cervical back: Normal range of motion and neck supple. No rigidity or tenderness. Right lower leg: No edema. Left lower leg: No edema. Skin:     General: Skin is warm and dry. Capillary Refill: Capillary refill takes less than 2 seconds. Coloration: Skin is not jaundiced or pale. Findings: No erythema or rash.    Neurological:      Mental Status: She is alert and oriented to person, place, and time. Psychiatric:         Mood and Affect: Mood normal.         Behavior: Behavior normal.         Thought Content:  Thought content normal.         Judgment: Judgment normal.           Invasive Devices:      Lab Results:   Results from last 7 days   Lab Units 08/16/23  0703   POTASSIUM mmol/L 4.8   CHLORIDE mmol/L 102   CO2 mmol/L 21   BUN mg/dL 84*   CREATININE mg/dL 4.86*   CALCIUM mg/dL 9.7     Other Studies:

## 2023-08-16 NOTE — PATIENT COMMUNICATION
Called and spoke to the patient's , Jeny Cowart, to relay Dr. Osiris Mae message above. Jeny Cowart expressed understanding and stated that he would be calling the squad to take the patient to  [facility]

## 2023-08-16 NOTE — ASSESSMENT & PLAN NOTE
Patient takes amlodipine 5 mg daily, olmesartan 40 mg daily, torsemide 5 mg daily, metoprolol 50 mg daily  Hold on losartan and torsemide as noted above

## 2023-08-16 NOTE — ASSESSMENT & PLAN NOTE
Recent Labs     08/16/23  1611   HGB 11.1*   MCV 86   7/5, ferritin 7, iron sat 8, TIBC 426, iron 26 some component of DOMI  Some component of CKD      Plan:  • Monitor CBC every day  • Transfuse if Hb < 7  • Monitor for now

## 2023-08-16 NOTE — PATIENT COMMUNICATION
Noah Yoo MD  [facility]  Since labs are significantly changed specially her kidney function   Please check to see how she is feeling if she is feeling poorly she should go to the emergency room! If she is feeling okay I recommend the following   1.  Stop olmesartan and torsemide completely   2.  Make sure she is off all antibiotics   3.  Repeat a stat basic metabolic profile tomorrow morning at a hospital-based lab along with a UA with microscopic and a CBC   4.  Have her send blood pressures in the next 24 to 48 hours!      Again if not feeling well at all she should go to the hospital!

## 2023-08-16 NOTE — ASSESSMENT & PLAN NOTE
Recent Labs     08/16/23  0703 08/16/23  1701   SODIUM 133* 128*     No results found for: "OSMOUA", "NAUR", "OSMOLALITSER"    Workup:  · Hypovolemic/volume depletion  · Likely  hyponatremia  Received 1 L sodium chloride bolus in the emergency department  Plan:   · Continue Isolyte 75 ml per hour  Recheck in the a.m.

## 2023-08-17 PROBLEM — N30.00 ACUTE CYSTITIS WITHOUT HEMATURIA: Status: RESOLVED | Noted: 2023-04-28 | Resolved: 2023-08-17

## 2023-08-17 LAB
ANION GAP SERPL CALCULATED.3IONS-SCNC: 7 MMOL/L
ATRIAL RATE: 59 BPM
BASOPHILS # BLD AUTO: 0.03 THOUSANDS/ÂΜL (ref 0–0.1)
BASOPHILS NFR BLD AUTO: 0 % (ref 0–1)
BUN SERPL-MCNC: 74 MG/DL (ref 5–25)
CALCIUM SERPL-MCNC: 9.3 MG/DL (ref 8.4–10.2)
CHLORIDE SERPL-SCNC: 104 MMOL/L (ref 96–108)
CO2 SERPL-SCNC: 24 MMOL/L (ref 21–32)
CREAT SERPL-MCNC: 3.5 MG/DL (ref 0.6–1.3)
EOSINOPHIL # BLD AUTO: 0.21 THOUSAND/ÂΜL (ref 0–0.61)
EOSINOPHIL NFR BLD AUTO: 3 % (ref 0–6)
ERYTHROCYTE [DISTWIDTH] IN BLOOD BY AUTOMATED COUNT: 16.2 % (ref 11.6–15.1)
GFR SERPL CREATININE-BSD FRML MDRD: 12 ML/MIN/1.73SQ M
GLUCOSE SERPL-MCNC: 128 MG/DL (ref 65–140)
GLUCOSE SERPL-MCNC: 177 MG/DL (ref 65–140)
GLUCOSE SERPL-MCNC: 186 MG/DL (ref 65–140)
GLUCOSE SERPL-MCNC: 210 MG/DL (ref 65–140)
GLUCOSE SERPL-MCNC: 81 MG/DL (ref 65–140)
HCT VFR BLD AUTO: 35.5 % (ref 34.8–46.1)
HGB BLD-MCNC: 11.5 G/DL (ref 11.5–15.4)
IMM GRANULOCYTES # BLD AUTO: 0.04 THOUSAND/UL (ref 0–0.2)
IMM GRANULOCYTES NFR BLD AUTO: 1 % (ref 0–2)
LYMPHOCYTES # BLD AUTO: 2.16 THOUSANDS/ÂΜL (ref 0.6–4.47)
LYMPHOCYTES NFR BLD AUTO: 28 % (ref 14–44)
MAGNESIUM SERPL-MCNC: 2.1 MG/DL (ref 1.9–2.7)
MCH RBC QN AUTO: 27.8 PG (ref 26.8–34.3)
MCHC RBC AUTO-ENTMCNC: 32.4 G/DL (ref 31.4–37.4)
MCV RBC AUTO: 86 FL (ref 82–98)
MONOCYTES # BLD AUTO: 0.62 THOUSAND/ÂΜL (ref 0.17–1.22)
MONOCYTES NFR BLD AUTO: 8 % (ref 4–12)
NEUTROPHILS # BLD AUTO: 4.65 THOUSANDS/ÂΜL (ref 1.85–7.62)
NEUTS SEG NFR BLD AUTO: 60 % (ref 43–75)
NRBC BLD AUTO-RTO: 0 /100 WBCS
P AXIS: 73 DEGREES
PHOSPHATE SERPL-MCNC: 4.2 MG/DL (ref 2.3–4.1)
PLATELET # BLD AUTO: 283 THOUSANDS/UL (ref 149–390)
PMV BLD AUTO: 11.9 FL (ref 8.9–12.7)
POTASSIUM SERPL-SCNC: 4.7 MMOL/L (ref 3.5–5.3)
PR INTERVAL: 252 MS
QRS AXIS: 20 DEGREES
QRSD INTERVAL: 86 MS
QT INTERVAL: 446 MS
QTC INTERVAL: 441 MS
RBC # BLD AUTO: 4.14 MILLION/UL (ref 3.81–5.12)
SODIUM SERPL-SCNC: 135 MMOL/L (ref 135–147)
T WAVE AXIS: 40 DEGREES
VENTRICULAR RATE: 59 BPM
WBC # BLD AUTO: 7.71 THOUSAND/UL (ref 4.31–10.16)

## 2023-08-17 PROCEDURE — 99223 1ST HOSP IP/OBS HIGH 75: CPT

## 2023-08-17 PROCEDURE — 82948 REAGENT STRIP/BLOOD GLUCOSE: CPT

## 2023-08-17 PROCEDURE — 84100 ASSAY OF PHOSPHORUS: CPT

## 2023-08-17 PROCEDURE — 80048 BASIC METABOLIC PNL TOTAL CA: CPT

## 2023-08-17 PROCEDURE — 85025 COMPLETE CBC W/AUTO DIFF WBC: CPT

## 2023-08-17 PROCEDURE — 99223 1ST HOSP IP/OBS HIGH 75: CPT | Performed by: INTERNAL MEDICINE

## 2023-08-17 PROCEDURE — 97163 PT EVAL HIGH COMPLEX 45 MIN: CPT

## 2023-08-17 PROCEDURE — 99232 SBSQ HOSP IP/OBS MODERATE 35: CPT | Performed by: INTERNAL MEDICINE

## 2023-08-17 PROCEDURE — 99233 SBSQ HOSP IP/OBS HIGH 50: CPT | Performed by: INTERNAL MEDICINE

## 2023-08-17 PROCEDURE — 83735 ASSAY OF MAGNESIUM: CPT

## 2023-08-17 PROCEDURE — 93010 ELECTROCARDIOGRAM REPORT: CPT | Performed by: INTERNAL MEDICINE

## 2023-08-17 RX ORDER — HYDRALAZINE HYDROCHLORIDE 10 MG/1
10 TABLET, FILM COATED ORAL EVERY 8 HOURS SCHEDULED
Status: CANCELLED | OUTPATIENT
Start: 2023-08-17

## 2023-08-17 RX ORDER — AMLODIPINE BESYLATE 5 MG/1
5 TABLET ORAL DAILY
Status: DISCONTINUED | OUTPATIENT
Start: 2023-08-17 | End: 2023-08-19 | Stop reason: HOSPADM

## 2023-08-17 RX ORDER — SODIUM CHLORIDE, SODIUM GLUCONATE, SODIUM ACETATE, POTASSIUM CHLORIDE, MAGNESIUM CHLORIDE, SODIUM PHOSPHATE, DIBASIC, AND POTASSIUM PHOSPHATE .53; .5; .37; .037; .03; .012; .00082 G/100ML; G/100ML; G/100ML; G/100ML; G/100ML; G/100ML; G/100ML
75 INJECTION, SOLUTION INTRAVENOUS CONTINUOUS
Status: DISPENSED | OUTPATIENT
Start: 2023-08-17 | End: 2023-08-18

## 2023-08-17 RX ORDER — LABETALOL HYDROCHLORIDE 5 MG/ML
10 INJECTION, SOLUTION INTRAVENOUS EVERY 6 HOURS PRN
Status: DISCONTINUED | OUTPATIENT
Start: 2023-08-17 | End: 2023-08-19 | Stop reason: HOSPADM

## 2023-08-17 RX ORDER — LABETALOL HYDROCHLORIDE 5 MG/ML
10 INJECTION, SOLUTION INTRAVENOUS EVERY 6 HOURS
Status: DISCONTINUED | OUTPATIENT
Start: 2023-08-17 | End: 2023-08-17

## 2023-08-17 RX ADMIN — HEPARIN SODIUM 5000 UNITS: 5000 INJECTION INTRAVENOUS; SUBCUTANEOUS at 13:05

## 2023-08-17 RX ADMIN — SODIUM CHLORIDE, SODIUM GLUCONATE, SODIUM ACETATE, POTASSIUM CHLORIDE AND MAGNESIUM CHLORIDE 75 ML/HR: 526; 502; 368; 37; 30 INJECTION, SOLUTION INTRAVENOUS at 07:35

## 2023-08-17 RX ADMIN — GABAPENTIN 300 MG: 300 CAPSULE ORAL at 22:40

## 2023-08-17 RX ADMIN — Medication 1 TABLET: at 11:53

## 2023-08-17 RX ADMIN — PANTOPRAZOLE SODIUM 40 MG: 40 TABLET, DELAYED RELEASE ORAL at 07:52

## 2023-08-17 RX ADMIN — OMEGA-3 FATTY ACIDS CAP 1000 MG 1000 MG: 1000 CAP at 16:21

## 2023-08-17 RX ADMIN — DULOXETINE HYDROCHLORIDE 60 MG: 60 CAPSULE, DELAYED RELEASE ORAL at 07:36

## 2023-08-17 RX ADMIN — HEPARIN SODIUM 5000 UNITS: 5000 INJECTION INTRAVENOUS; SUBCUTANEOUS at 05:38

## 2023-08-17 RX ADMIN — ACETAMINOPHEN 975 MG: 325 TABLET, FILM COATED ORAL at 17:59

## 2023-08-17 RX ADMIN — Medication 1 TABLET: at 07:36

## 2023-08-17 RX ADMIN — LEVOTHYROXINE SODIUM 112 MCG: 112 TABLET ORAL at 05:38

## 2023-08-17 RX ADMIN — PANTOPRAZOLE SODIUM 40 MG: 40 TABLET, DELAYED RELEASE ORAL at 16:22

## 2023-08-17 RX ADMIN — HEPARIN SODIUM 5000 UNITS: 5000 INJECTION INTRAVENOUS; SUBCUTANEOUS at 22:39

## 2023-08-17 RX ADMIN — Medication 250 MG: at 22:39

## 2023-08-17 RX ADMIN — AMLODIPINE BESYLATE 5 MG: 5 TABLET ORAL at 07:37

## 2023-08-17 RX ADMIN — INSULIN DETEMIR 50 UNITS: 100 INJECTION, SOLUTION SUBCUTANEOUS at 16:21

## 2023-08-17 RX ADMIN — ATORVASTATIN CALCIUM 10 MG: 10 TABLET, FILM COATED ORAL at 16:22

## 2023-08-17 RX ADMIN — INSULIN LISPRO 16 UNITS: 100 INJECTION, SOLUTION INTRAVENOUS; SUBCUTANEOUS at 11:53

## 2023-08-17 RX ADMIN — Medication 250 MG: at 07:36

## 2023-08-17 RX ADMIN — PRAMIPEXOLE DIHYDROCHLORIDE 0.25 MG: 0.25 TABLET ORAL at 22:40

## 2023-08-17 RX ADMIN — OMEGA-3 FATTY ACIDS CAP 1000 MG 1000 MG: 1000 CAP at 07:36

## 2023-08-17 RX ADMIN — Medication 1 TABLET: at 16:21

## 2023-08-17 RX ADMIN — METOPROLOL TARTRATE 50 MG: 50 TABLET, FILM COATED ORAL at 22:40

## 2023-08-17 RX ADMIN — INSULIN LISPRO 16 UNITS: 100 INJECTION, SOLUTION INTRAVENOUS; SUBCUTANEOUS at 07:40

## 2023-08-17 RX ADMIN — FAMOTIDINE 10 MG: 20 TABLET, FILM COATED ORAL at 07:37

## 2023-08-17 RX ADMIN — Medication 100 MG: at 07:36

## 2023-08-17 RX ADMIN — METOPROLOL TARTRATE 50 MG: 50 TABLET, FILM COATED ORAL at 07:37

## 2023-08-17 NOTE — ASSESSMENT & PLAN NOTE
Assessment  · Patient takes amlodipine 5 mg daily, olmesartan 40 mg daily, torsemide 5 mg daily, metoprolol 50 mg daily  · Patient hypertensive on 8/17/2023, 170/110, started labetalol with good response    Plan  · Continue to hold losartan in the setting of ANDRA  · On discharge, resume torsemide 5 mg every other day  · Continue metoprolol  · Continue amlodipine 5 mg twice daily, consider giving twice daily if systolic pressures remain above 150  · Labetalol as needed for systolic pressure greater than 180

## 2023-08-17 NOTE — CONSULTS
Consult - Urology   Citlalli Thayer 1950, 75 y.o. female MRN: [id number]    Unit/Bed#: W -20 Encounter: [id number]    Assessment & Plan:  1. ANDRA  2. Incomplete bladder emptying  - Augusto Ribeiro is a 69-year-old known to our practice with history of recurrent urinary tract infections. Past urologic history including incomplete bladder emptying, neurogenic bladder for which she does CIC daily.    - No leukocytosis  - Creatinine on admission 4.86, baseline 2. Downtrending today to 3.5.  - CT on admission showing no findings of acute abdominopelvic injury or acute abdominopelvic pathology. No hydronephrosis. Few calculi in upper pole measuring 0.6 cm.   - Urine culture yesterday, gram negative rods  - ID consulted. Not convinced patient has invasive UTI. Patient without fever or leukocytosis. Monitoring off antibiotics. - Increase straight cath. Monitor PVRs, straight cath for PVR greater than 400 mL  - Patient reports at home she straight caths once a day to once every other day  - Avoid bladder irritants  - Patient on cranberry supplements  - No acute  surgical intervention  - Patient to follow up outpatient. Subjective:   Augusto Ribeiro  is a 69-year-old past medical history CKD, hypertension, DM 2, hypertension, hypothyroidism, who presented to the ED with UTI. Patient with altered mental status. Patient was noted to have a UTI on 8/14, urine culture showing ESBL bacteria and she was prescribed cefdinir. In the ED patient had a creatinine of 4.7, baseline 2.0 and was advised to go to the ED for further evaluation. In the ED CT scan was done showing no findings of acute abdominopelvic injury or for pathology. Nonobstructing left renal calculi measuring 0.6 cm with no hydroureteronephrosis. Patient is known to our practice last seen in May 2023 for history of recurrent UTI. She was prescribed Estrace 3 times weekly, standing urine cultures and Keflex.   She does CIC daily due to history of incomplete bladder emptying, neurogenic bladder. Her previous urine cultures show Enterobacter cloacae. Urology was consulted due to recurrent UTI. Review of Systems   Constitutional: Negative for chills and fever. Respiratory: Negative for cough and shortness of breath. Cardiovascular: Negative for chest pain and palpitations. Gastrointestinal: Negative for abdominal pain and vomiting. Genitourinary: Negative for dysuria and hematuria. Musculoskeletal: Negative for arthralgias and back pain. Skin: Negative for color change and rash. Neurological: Negative for seizures and syncope. All other systems reviewed and are negative. Objective:  Vitals: Blood pressure 133/63, pulse 57, temperature 97.5 °F (36.4 °C), resp. rate 17, weight 80.6 kg (177 lb 11.1 oz), SpO2 93 %. ,Body mass index is 32.5 kg/m². Physical Exam  Vitals reviewed. Constitutional:       General: She is not in acute distress. Appearance: Normal appearance. She is normal weight. She is not toxic-appearing or diaphoretic. Comments: Chronically ill appearing   HENT:      Head: Normocephalic and atraumatic. Right Ear: External ear normal.      Left Ear: External ear normal.      Nose: Nose normal.      Mouth/Throat:      Mouth: Mucous membranes are moist.   Eyes:      General: No scleral icterus. Conjunctiva/sclera: Conjunctivae normal.   Cardiovascular:      Rate and Rhythm: Normal rate. Pulses: Normal pulses. Pulmonary:      Effort: Pulmonary effort is normal.   Abdominal:      General: Abdomen is flat. There is no distension. Palpations: Abdomen is soft. Tenderness: There is no abdominal tenderness. There is no guarding. Musculoskeletal:         General: Normal range of motion. Cervical back: Normal range of motion. Skin:     General: Skin is warm. Findings: No rash. Neurological:      General: No focal deficit present. Mental Status: She is alert and oriented to person, place, and time. Mental status is at baseline. Psychiatric:         Mood and Affect: Mood normal.         Behavior: Behavior normal.         Thought Content: Thought content normal.         Judgment: Judgment normal.         Imaging:  CT ABDOMEN AND PELVIS WITHOUT IV CONTRAST - LOW DOSE RENAL STONE     INDICATION:   Flank pain, kidney stone suspected  left flank pain, history of nephrolithiasis, also c/o pain in coccyx s/p fall yesterday.        COMPARISON: CT abdomen pelvis March 30, 2022     TECHNIQUE:  Low radiation dose thin section CT examination of the abdomen and pelvis was performed without intravenous or oral contrast according to a protocol specifically designed to evaluate for urinary tract calculus. Axial, sagittal, and coronal 2D   reformatted images were created from the source data and submitted for interpretation. Evaluation for pathology in the abdomen and pelvis that is unrelated to urinary tract calculi is limited.     Radiation dose length product (DLP) for this visit:  200 mGy-cm . This examination, like all CT scans performed in the [facility], was performed utilizing techniques to minimize radiation dose exposure, including the use of iterative   reconstruction and automated exposure control.     URINARY TRACT FINDINGS:     RIGHT KIDNEY AND URETER: Renal cortical thinning. 0.8 cm hemorrhagic cyst in the upper pole. No nephrolithiasis or hydroureteronephrosis.     LEFT KIDNEY AND URETER: Renal cortical thinning. Few calculi in the upper pole measuring up to 0.6 cm. Two  1.1 cm hemorrhagic cysts in the lower pole.  No hydroureteronephrosis.     URINARY BLADDER:  Unremarkable.        ADDITIONAL FINDINGS:     LOWER CHEST:  No clinically significant abnormality identified in the visualized lower chest.     SOLID VISCERA: Limited low radiation dose noncontrast CT evaluation demonstrates no clinically significant abnormality of the imaged portions of the liver, spleen, pancreas, or adrenal glands.     GALLBLADDER/BILIARY TREE: Gallbladder is surgically absent. No biliary dilatation.     STOMACH AND BOWEL: Postsurgical change at the gastroesophageal junction. No bowel obstruction.     APPENDIX:  No findings to suggest appendicitis.     ABDOMINOPELVIC CAVITY:  No ascites. No pneumoperitoneum. No lymphadenopathy.     VESSELS: Calcified atherosclerosis without aortic aneurysm.     REPRODUCTIVE ORGANS:  Unremarkable for patient's age.     ABDOMINAL WALL/INGUINAL REGIONS: Postsurgical change.     OSSEOUS STRUCTURES: No acute fracture or destructive osseous lesion. Spinal degenerative changes are noted.     IMPRESSION:     1. No findings of acute abdominopelvic injury or acute abdominopelvic pathology. 2.  Nonobstructing left renal calculi measuring up to 0.6 cm without hydroureteronephrosis.            Workstation performed: [id number]       Labs:  Recent Labs     23  1611 23  0544   WBC 9.67 7.71     Recent Labs     23  1611 23  0544   HGB 11.1* 11.5     Recent Labs     23  0703 23  1701 23  0544   CREATININE 4.86* 4.21* 3.50*       History:  Social History     Socioeconomic History   • Marital status: /Civil Union     Spouse name: None   • Number of children: None   • Years of education: None   • Highest education level: None   Occupational History   • Occupation: RETIRED   Tobacco Use   • Smoking status: Former     Packs/day: 2.00     Years: 10.00     Total pack years: 20.00     Types: Cigarettes     Start date: 1968     Quit date: 1976     Years since quittin.6     Passive exposure: Past   • Smokeless tobacco: Never   • Tobacco comments:     Smoked 2 pack a day   Vaping Use   • Vaping Use: Never used   Substance and Sexual Activity   • Alcohol use: Yes     Comment: 1 or 2 a year   • Drug use: No   • Sexual activity: Not Currently     Partners: Male     Birth control/protection: Female Sterilization     Comment: defer   Other Topics Concern   • None   Social History Narrative   • None     Social Determinants of Health     Financial Resource Strain: Low Risk  (5/24/2023)    Overall Financial Resource Strain (CARDIA)    • Difficulty of Paying Living Expenses: Not hard at all   Food Insecurity: No Food Insecurity (3/27/2023)    Hunger Vital Sign    • Worried About Running Out of Food in the Last Year: Never true    • Ran Out of Food in the Last Year: Never true   Transportation Needs: No Transportation Needs (5/24/2023)    PRAPARE - Transportation    • Lack of Transportation (Medical): No    • Lack of Transportation (Non-Medical):  No   Physical Activity: Not on file   Stress: Not on file   Social Connections: Not on file   Intimate Partner Violence: Not on file   Housing Stability: Unknown (3/27/2023)    Housing Stability Vital Sign    • Unable to Pay for Housing in the Last Year: No    • Number of Places Lived in the Last Year: Not on file    • Unstable Housing in the Last Year: No     Past Medical History:   Diagnosis Date   • Actinic keratosis    • Acute cystitis without hematuria 04/28/2023   • Acute-on-chronic kidney injury [facility])    • ANDRA (acute kidney injury) ([address]) 05/08/2020   • Allergic    • Allergic rhinitis    • Anesthesia     pt reports "had to use double lumen endo tube for hiatal hernia repair/so surgeon could get to where he needed to work"   • Arthritis    • Aspiration into airway    • Ahumada esophagus 1993    Lot of stress with children   • Basal cell carcinoma 2007    left cheek    • BCC (basal cell carcinoma) 05/27/2021    Left Nasal tip   • Breast discharge 6/19/2023   • Breast pain 6/19/2023   • Cancer ([address])     squamous cell cancer on forhead   • Cancer ([address])     basal cell on nose    • Cholelithiasis    • Chronic kidney disease 2000, 2018    Stones, kidney disease stage 4   • Chronic pain disorder     bilat feet and joint pain on occas   • Colon polyp    • Constipation    • COPD (chronic obstructive pulmonary disease) ([address]) • COVID-19 08/2021    recovered at home/did receive monoclonal infusion   • COVID-19 virus infection 08/16/2021   • Dental bridge present    • Depression    • Diabetes mellitus ([address])     Type 2   • Diabetic neuropathy ([address])    • Disease of thyroid gland    • Dyspnea    • Elevated liver enzymes 4/4/2023   • Epigastric abdominal pain with severe diabetic gastroparesis, status post EGD/Botox injection, 5/14 5/17/2021   • Facial abrasion, initial encounter 3/22/2023   • Fall 03/22/2023   • Family history of thyroid problem    • Fatty liver    • Fracture of orbital floor, blow-out, right, closed, with routine healing, subsequent encounter 3/22/2023   • GERD (gastroesophageal reflux disease)    • Heart burn    • Hiatal hernia    • History of colon polyps 7/30/2021   • History of kidney stones 9/29/2020    Hx of recurrent kidney stones   • History of kidney stones 9/29/2020    Hx of recurrent kidney stones  Formatting of this note might be different from the original. Hx of recurrent kidney stones  Last Assessment & Plan:  Formatting of this note might be different from the original. We will set her up for follow-up in 3 months with a KUB prior. She knows to call if she has any kidney stone type pain in the meantime. • History of pneumonia    • History of repair of hiatal hernia 5/3/2020   • Hyperlipidemia    • Hyperplasia, parathyroid ([address])    • Hypertension    • Hypotension 04/13/2022   • Kidney problem    • Kidney stone    • Memory loss Julu2 2020   • Motion sickness    • Motion sickness    • Multiple closed fractures of ribs of right side 3/22/2023   • Nasal bones, closed fracture 3/22/2023   • Neck pain    • Obesity 1978   • Obesity (BMI 30.0-34. 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, left     great toe fusion   • Urinary incontinence    • Urinary tract infection 03/28/2022   • Wears glasses      Past Surgical History:   Procedure Laterality Date   • ABDOMINAL SURGERY  [id number]    Nissen x2 1972 tubal ligarion   • ARTHROSCOPY KNEE     • BREAST BIOPSY      stereotactic-benign   • BREAST BIOPSY      stereo-benign   • BREAST CYST EXCISION Bilateral     benign   • BREAST EXCISIONAL BIOPSY      unknown date-benign   • BREAST EXCISIONAL BIOPSY      unknown date-benign   • BREAST EXCISIONAL BIOPSY      unknown date-benign   • BREAST EXCISIONAL BIOPSY      unknown age-benign   • CARDIAC CATHETERIZATION     • CHOLECYSTECTOMY     • COLONOSCOPY     • EXAMINATION UNDER ANESTHESIA N/A 06/24/2021    Procedure: EXAM UNDER ANESTHESIA (EUA), DISE;  Surgeon: Yvan Jordan MD;  Location: [facility];  Service: ENT   • HERNIA REPAIR     • HIATAL HERNIA REPAIR     • KNEE SURGERY      Torn maniscus lap surg   • LIPOSUCTION     • LYMPH NODE BIOPSY     • MOHS SURGERY  05/20/2021    left mid forehead-Gautam   • MOHS SURGERY Left 05/27/2021    Left nasal tip- gautam    • AZ LIGATION/BIOPSY TEMPORAL ARTERY Left 01/05/2023    Procedure: BIOPSY ARTERY TEMPORAL;  Surgeon: Cheyenne Zamorano DO;  Location: [facility];  Service: Vascular   • AZ OPEN IMPLANTATION CRANIAL NERVE VASQUEZ & PULSE GEN N/A 11/10/2021    Procedure: INSERTION UPPER AIRWAY STIMULATOR, INSPIRE IMPLANT;  Surgeon: Yvan Jordan MD;  Location: [facility];  Service: ENT   • AZ UNLISTED PROCEDURE FOOT/TOES Right 07/19/2022    Procedure: 1st metatarsal phalangeal joint fusion;  Surgeon: Satish Herbert DPM;  Location: [facility];  Service: Podiatry   • REDUCTION MAMMAPLASTY Bilateral 2000   • REDUCTION MAMMAPLASTY     • SKIN BIOPSY     • SKIN CANCER EXCISION  2007    squamous cell carcinoma    • SKIN CANCER EXCISION  2007    basal cell carcinoma   • SQUAMOUS CELL CARCINOMA EXCISION     • TOE SURGERY Right 08/04/2022    Right Great Toe Fusion   • TONSILLECTOMY     • TUBAL LIGATION     • UPPER GASTROINTESTINAL ENDOSCOPY     • US GUIDED BREAST BIOPSY RIGHT COMPLETE Right 07/18/2022     Family History   Problem Relation Age of Onset   • Heart disease Mother    • Depression Mother    • Hypertension Mother    • COPD Mother    • Hearing loss Mother    • Anxiety disorder Mother    • Heart disease Father    • Lung cancer Father 79        Smoker    • Cancer Father         brain   • Alcohol abuse Father    • Dementia Father    • Hypertension Father    • Thyroid disease Father    • COPD Father    • Arthritis Father    • Brain cancer Father 76   • Hypertension Sister    • Diabetes Sister    • Heart disease Sister    • Thyroid disease Sister    • Cancer Sister         Lympoma, lung   • Lung cancer Sister 78   • Anxiety disorder Sister    • Hypertension Brother    • Diabetes Brother    • Cancer Brother         Throat   • Dementia Brother    • Stroke Brother    • Hypertension Brother    • Heart disease Brother    • Diabetes Brother    • Hypertension Brother    • No Known Problems Son    • No Known Problems Son    • Brain cancer Paternal Aunt         unknown age   • Diabetes Sister    • Hypertension Sister    • Diabetes Brother    • Breast cancer Neg Hx        Pat Newman  Date: 8/17/2023 Time: 11:34 AM

## 2023-08-17 NOTE — ASSESSMENT & PLAN NOTE
Recent Labs     08/16/23  1611 08/17/23  0544   HGB 11.1* 11.5   MCV 86 86   7/5, ferritin 7, iron sat 8, TIBC 426, iron 26 some component of DOMI  Some component of CKD      Plan:  • Monitor CBC every day  • Transfuse if Hb < 7  • Monitor for now

## 2023-08-17 NOTE — ASSESSMENT & PLAN NOTE
Assessment  · Appears to have recurrent UTI,  secondary to neurogenic bladder  · 5/23 patient had Enterobacter cloacae-CRE, susceptible to cefepime, ciprofloxacin, gentamicin, levofloxacin, meropenem  · 6/23 grew lactobacillus cloacae no sensitivity studies noted  · 8/9 was noted to have Serratia marcescens- ESBL  · Recently patient had Serratia marcescens, on cefdinir by urologist, started on 8/14  · Patient has continued urgency and hesitancy   · Urine culture positive for Serratia this admission  · Patient remains encephalopathic, will treat UTI once discharged    Plan  · Start cefpodoxime 100 mg once daily for 5 days upon discharge, renal dosing  · Continue straight catheterization  · Urinary retention protocol  · Urology consult on board, appreciate recs

## 2023-08-17 NOTE — ASSESSMENT & PLAN NOTE
Assessment  · Patient takes amlodipine 5 mg daily, olmesartan 40 mg daily, torsemide 5 mg daily, metoprolol 50 mg daily    Plan  · Hold on losartan and torsemide as noted above in the setting of ANDRA  · Continue amlodipine 5 mg and metoprolol 50 mg

## 2023-08-17 NOTE — PHYSICAL THERAPY NOTE
PHYSICAL THERAPY EVALUATION NOTE          Patient Name: Milan Rosales  [id number] Date: 8/17/2023        AGE:   75 y.o.   Mrn:   [id number]  ADMIT DX:  Hyponatremia [E87.1]  UTI (urinary tract infection) [N39.0]  ANDRA (acute kidney injury) ([address]) [N17.9]  History of UTI [Z87.440]  Acute cystitis without hematuria [N30.00]    Past Medical History:  Past Medical History:   Diagnosis Date    Actinic keratosis     Acute cystitis without hematuria 04/28/2023    Acute cystitis without hematuria 4/28/2023    Acute-on-chronic kidney injury ([address])     ANDRA (acute kidney injury) ([address]) 05/08/2020    Allergic     Allergic rhinitis     Anesthesia     pt reports "had to use double lumen endo tube for hiatal hernia repair/so surgeon could get to where he needed to work"    Arthritis     Aspiration into airway     Ahumada esophagus 1993    Lot of stress with children    Basal cell carcinoma 2007    left cheek     BCC (basal cell carcinoma) 05/27/2021    Left Nasal tip    Breast discharge 6/19/2023    Breast pain 6/19/2023    Cancer ([address])     squamous cell cancer on forhead    Cancer ([address])     basal cell on nose     Cholelithiasis     Chronic kidney disease 2000, 2018    Stones, kidney disease stage 4    Chronic pain disorder     bilat feet and joint pain on occas    Colon polyp     Constipation     COPD (chronic obstructive pulmonary disease) ([address])     COVID-19 08/2021    recovered at home/did receive monoclonal infusion    COVID-19 virus infection 08/16/2021    Dental bridge present     Depression     Diabetes mellitus ([address])     Type 2    Diabetic neuropathy ([address])     Disease of thyroid gland     Dyspnea     Elevated liver enzymes 4/4/2023    Epigastric abdominal pain with severe diabetic gastroparesis, status post EGD/Botox injection, 5/14 5/17/2021    Facial abrasion, initial encounter 3/22/2023    Fall 03/22/2023    Family history of thyroid problem     Fatty liver     Fracture of orbital floor, blow-out, right, closed, with routine healing, subsequent encounter 3/22/2023    GERD (gastroesophageal reflux disease)     Heart burn     Hiatal hernia     History of colon polyps 7/30/2021    History of kidney stones 9/29/2020    Hx of recurrent kidney stones    History of kidney stones 9/29/2020    Hx of recurrent kidney stones  Formatting of this note might be different from the original. Hx of recurrent kidney stones  Last Assessment & Plan:  Formatting of this note might be different from the original. We will set her up for follow-up in 3 months with a KUB prior. She knows to call if she has any kidney stone type pain in the meantime.     History of pneumonia     History of repair of hiatal hernia 5/3/2020    Hyperlipidemia     Hyperplasia, parathyroid ([address])     Hypertension     Hypotension 04/13/2022    Kidney problem     Kidney stone     Memory loss Julu2 2020    Motion sickness     Motion sickness     Multiple closed fractures of ribs of right side 3/22/2023    Nasal bones, closed fracture 3/22/2023    Neck pain     Obesity 1978    Obesity (BMI 30.0-34.9)     Other chest pain 9/13/2021    Pollen allergies     RLS (restless legs syndrome)     S/P foot surgery 7/20/2022    SCC (squamous cell carcinoma) 05/04/2021    left mid forehead    Seasonal allergies     Sleep apnea     has inspire    Squamous cell skin cancer 2007    left cheek     Swollen ankles     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, left     great toe fusion    Urinary incontinence     Urinary tract infection 03/28/2022    Wears glasses        Past Surgical History:  Past Surgical History:   Procedure Laterality Date    ABDOMINAL SURGERY  1549,8746,3165    Nissen x2 1972 tubal ligarion    ARTHROSCOPY KNEE      BREAST BIOPSY      stereotactic-benign    BREAST BIOPSY      stereo-benign    BREAST CYST EXCISION Bilateral     benign    BREAST EXCISIONAL BIOPSY      unknown date-benign    BREAST EXCISIONAL BIOPSY      unknown date-benign BREAST EXCISIONAL BIOPSY      unknown date-benign    BREAST EXCISIONAL BIOPSY      unknown age-benign    CARDIAC CATHETERIZATION      CHOLECYSTECTOMY      COLONOSCOPY      EXAMINATION UNDER ANESTHESIA N/A 2021    Procedure: EXAM UNDER ANESTHESIA (EUA), DISE;  Surgeon: Shayna Childs MD;  [facility];  Service: ENT    HERNIA REPAIR      HIATAL HERNIA REPAIR      KNEE SURGERY      Torn maniscus lap surg    LIPOSUCTION      LYMPH NODE BIOPSY      MOHS SURGERY  2021    left mid forehead-Gautam    MOHS SURGERY Left 2021    Left nasal tip- gautam     VA LIGATION/BIOPSY TEMPORAL ARTERY Left 2023    Procedure: BIOPSY ARTERY TEMPORAL;  Surgeon: Brian Restrepo DO;  [facility];  Service: Vascular    VA OPEN IMPLANTATION CRANIAL NERVE VASQUEZ & PULSE GEN N/A 11/10/2021    Procedure: INSERTION UPPER AIRWAY STIMULATOR, INSPIRE IMPLANT;  Surgeon: Shayna Childs MD;  [facility];  Service: ENT    VA UNLISTED PROCEDURE FOOT/TOES Right 2022    Procedure: 1st metatarsal phalangeal joint fusion;  Surgeon: Marc Fields DPM;  Location[facility];  Service: [address] [place] [address] Bilateral 2000    REDUCTION MAMMAPLASTY      SKIN BIOPSY      SKIN CANCER EXCISION  2007    squamous cell carcinoma     SKIN CANCER EXCISION  2007    basal cell carcinoma    SQUAMOUS CELL CARCINOMA EXCISION      TOE SURGERY Right 2022    Right Great Toe Fusion    TONSILLECTOMY      TUBAL LIGATION      UPPER GASTROINTESTINAL ENDOSCOPY      US GUIDED BREAST BIOPSY RIGHT COMPLETE Right 2022     Length Of Stay: 1        PHYSICAL THERAPY EVALUATION:    Patient's identity confirmed via 2 patient identifiers (full name and ) at start of session       23 1139   PT Last Visit   PT Visit Date 23   Note Type   Note type Evaluation   Pain Assessment   Pain Assessment Tool FLACC   Pain Location/Orientation Orientation: Bilateral;Location: Knee  (L shoulder/arm)   Pain Onset/Description Onset: Ongoing; Descriptor: [facility] Pain Intervention(s) Repositioned; Ambulation/increased activity   Pain Rating: FLACC (Rest) - Face 0   Pain Rating: FLACC (Rest) - Legs 0   Pain Rating: FLACC (Rest) - Activity 0   Pain Rating: FLACC (Rest) - Cry 0   Pain Rating: FLACC (Rest) - Consolability 0   Score: FLACC (Rest) 0   Pain Rating: FLACC (Activity) - Face 1   Pain Rating: FLACC (Activity) - Legs 0   Pain Rating: FLACC (Activity) - Activity 0   Pain Rating: FLACC (Activity) - Cry 0   Pain Rating: FLACC (Activity) - Consolability 0   Score: FLACC (Activity) 1   Restrictions/Precautions   Other Precautions Cognitive; Chair Alarm; Bed Alarm;Contact/isolation; Fall Risk;Pain   Home Living   Type of [address] [place] [address] Two level;Performs ADLs on one level; Able to live on main level with bedroom/bathroom  (0 TRISTIN, 1st floor set-up)   Bathroom Shower/Tub Walk-in shower   Bathroom Toilet Raised   Bathroom Equipment Built-in shower seat   Bathroom Accessibility Accessible   Home Equipment Walker;Cane  (rollator walker)   Prior Function   Level of [place] Independent with functional mobility; Independent with ADLs; Needs assistance with IADLS   Lives With Spouse;Son  (adult son)   Jus Cabrera Help From Family   IADLs Family/Friend/Other provides transportation; Family/Friend/Other provides meals; Family/Friend/Other provides medication management  ("no my  won't let me drive")   Falls in the last 6 months 1 to 4  (3 per pt)   Comments Pt reports she amb ind w/ rollator walker, performs all ADLs ind, pt states her  manages her medication and provides transportation   General   Additional Pertinent History Pt w/ reports increased word-finding difficulty and increasing fatigue/weakness for the past few months.  Per chart review of previous admission, pt w/ h/o OT intervention to assist w/ cognition s/p prolonged intubation on a ventilator (12 days)   Family/Caregiver Present No   Cognition Overall Cognitive Status Impaired   Arousal/Participation Alert   Orientation Level Oriented X4   Memory Decreased recall of recent events   Following Commands Follows one step commands without difficulty   Comments Pt ID via name and ; pt agreeable to PT eval and mobility   RLE Assessment   RLE Assessment X   Strength RLE   R Hip Flexion 3+/5   R Knee Flexion 4/5   R Knee Extension 4/5   R Ankle Dorsiflexion 4/5   R Ankle Plantar Flexion 4/5   LLE Assessment   LLE Assessment X   Strength LLE   L Hip Flexion 4/5   L Knee Flexion 4/5   L Knee Extension 4/5   L Ankle Dorsiflexion 4/5   L Ankle Plantar Flexion 4/5   Light Touch   RLE Light Touch Grossly intact   LLE Light Touch Grossly intact   Bed Mobility   Additional Comments pt sitting at EOB upon arrival   Transfers   Sit to Stand 5  Supervision   Additional items Assist x 1; Increased time required;Verbal cues   Stand to Sit 4  Minimal assistance   Additional items Assist x 1; Armrests; Increased time required;Verbal cues   Ambulation/Elevation   Gait pattern Improper Weight shift;Decreased foot clearance; Short stride; Excessively slow   Gait Assistance 5  Supervision   Additional items Assist x 1;Verbal cues   Assistive Device Rolling walker   Distance 76'   Ambulation/Elevation Additional Comments 2 episodes of poor obstacle negotiation w/ RW   Balance   Static Sitting Good   Dynamic Sitting Fair +   Static Standing Fair   Dynamic Standing [facility]  (w/ RW)   Activity Tolerance   Activity Tolerance Patient limited by fatigue   Medical Staff Made Aware Spoke to Madhavi Zuniga of pt's performance, c/o fatigue/weakness and word finding difficulties, and of PT DC rec   Nurse Made Aware RN Aj   Assessment   Prognosis Fair   Problem List Decreased strength;Decreased endurance; Impaired balance;Decreased mobility; Decreased cognition; Impaired judgement;Decreased safety awareness;Pain   Assessment Anali Henry is a 75 y.o. Female who [facility] due to possible UTI and diagnosis of ANDRA on CKD stage IV. Orders for PT eval and treat received, w/ activity orders of up and OOB as tolerated and contact isolation precautions. Comorbidities affecting pt's functional mobility at time of evaluation include: CKD, DM, anemia, fibromyalgia, osteoporosis, memory changes. Personal factors affecting DC include: ambulating w/ assistive device, decreased cognition, positive fall history and limited insight into impairments. At baseline, pt mobilizes independently w/ walker, and w/ 3 fall(s) in the previous 6 months. Upon evaluation, pt presents w/ the following deficits: impaired strength, impaired balance, impaired cognition, decreased safety awareness, pain affecting mobility and gait deviations. Pt currently requires supervision for transfers, supervision w/ RW for ambulation. Pt's clinical presentation is unstable/unpredictable due to abnormal lab values, pain affecting mobility tolerance, need to input for safety awareness, recent h/o falls, ongoing medical management. From a PT/mobility standpoint given the above findings, DC recommendation is: Home w/ OPPT. During current admission, pt will benefit from continued skilled inpatient PT in the acute care setting in order to address the above deficits and to maximize function and mobility prior to DC from acute care. Goals   Patient Goals to get some sleep   STG Expiration Date 08/27/23   Short Term Goal #1 Pt will: perform bed mobility w/ mod I to decrease pt's burden of care and increase pt's independence w/ repositioning in bed; perform transfers w/ mod I to promote increased OOB mobility; ambulate at least 350' w/ LRAD and mod I to increase pt's ambulatory endurance/tolerance; increase all balance ratings by at least 1 grade to decrease pt's risk of falls   PT Treatment Day 0   Plan   Treatment/Interventions Functional transfer training;LE strengthening/ROM; Therapeutic exercise; Endurance training;Equipment eval/education; Bed mobility; Patient/family training;Gait training; Compensatory technique education   PT Frequency 2-3x/wk   Recommendation   PT Discharge Recommendation Home with outpatient rehabilitation   Equipment Recommended [facility] Recommended Wheeled walker   AM-PAC Basic Mobility Inpatient   Turning in Flat Bed Without Bedrails 4   Lying on Back to Sitting on Edge of Flat Bed Without Bedrails 3   Moving Bed to Chair 3   Standing Up From Chair Using Arms 3   Walk in Room 3   Climb 3-5 Stairs With Railing 3   Basic Mobility Inpatient Raw Score 19   Basic Mobility Standardized Score 42.48   Highest Level Of Mobility   JH-HLM Goal 6: Walk 10 steps or more   JH-HLM Achieved 7: Walk 25 feet or more   End of Consult   Patient Position at End of Consult Bedside chair;Bed/Chair alarm activated; All needs within reach       The patient's AM-PAC Basic Mobility Inpatient Short Form Raw Score is 19. A Raw score of greater than 16 suggests the patient may benefit from discharge to home. Please also refer to the recommendation of the Physical Therapist for safe discharge planning.     Pt will benefit from skilled inpatient PT during this admission in order to facilitate progress towards goals and to maximize functional independence prior to [facility] rec: Dilan Stein, PT, DPT  08/17/23

## 2023-08-17 NOTE — ASSESSMENT & PLAN NOTE
Lab Results   Component Value Date    HGBA1C 6.8 (H) 03/07/2023       Recent Labs     08/16/23  1949/23  0722 08/17/23  1106   POCGLU 352* 186* 128       Blood Sugar Average: Last 72 hrs:  (P) 222Home medications insulin aspart 3 times daily before meals  16 units breakfast and lunch, 25 units at dinner  15 units detemir every evening  Diabetic diet  Sliding scale

## 2023-08-17 NOTE — ASSESSMENT & PLAN NOTE
Assessment   · POA confused  · Likely in the setting of acute infection, toxic metabolic encephalopathy due to to ANDRA  Advanced age  · Remains encephalopathic    Plan  · Delirium precautions  -Initiate delirium precautions  -maintain normal sleep/wake cycle  -minimize overnight interruptions, group overnight vitals/labs/nursing checks as possible  -dim lights, close blinds and turn off tv to minimize stimulation and encourage sleep environment in evenings  -StartTylenol 975mg Q8H prn  -monitor for fecal and urinary retention which may precipitate delirium  -encourage early mobilization and ambulation  -provide frequent reorientation and redirection  -encourage family and friends at the bedside to help help calm patient if anxious  -avoid medications which may precipitate or worsen delirium such as tramadol, benzodiazepine, anticholinergics, and benadryl  -encourage hydration and nutrition   -redirect unwanted behaviors as first line

## 2023-08-17 NOTE — ASSESSMENT & PLAN NOTE
Lab Results   Component Value Date    CREATININE 2.60 (H) 08/18/2023    CREATININE 3.50 (H) 08/17/2023    CREATININE 4.21 (H) 08/16/2023       Lab Results   Component Value Date    EGFR 17 08/18/2023    EGFR 12 08/17/2023    EGFR 9 08/16/2023       • POA 4.21; (baseline baseline appears to be 1.8-2.3)  • UA:  Trace protein, 4-10 WBC  • UA microscopy 4-10 WBC  • History of neurogenic bladder and recurrent UTI  · Imaging:  CT without contrast shows 8/16/2023 1. No findings of acute abdominopelvic injury or acute abdominopelvic pathology. 2.  Nonobstructing left renal calculi measuring up to 0.6 cm without hydroureteronephrosis.   • Likely prerenal in the setting of poor p.o. intake/hydration as well  • Improving as of 8/18/23, Cr 2.6    Plan:  • Urinary retention protocol, Bladder scan, Daily weights, I/O  • Monitor off fluids per nephrology  • Monitor BMP daily and observe for downward trend of creatinine  • Avoid hypoperfusion of the kidneys, minimize nephrotoxins  RAAS Blockers/Diuretics held: Continue holding olmesartan, upon discharge resume torsemide 5 mg every other day

## 2023-08-17 NOTE — DISCHARGE INSTR - AVS FIRST PAGE
Dear Anali Henry,     It was our pleasure to care for you here at [facility]. It is our hope that we were always able to exceed the expected standards for your care during your stay. You were hospitalized due to altered mental status acute kidney injury. You were cared for on the [address] floor by Neela Angulo DO under the service of Toyin Watson MD with the [facility] who covers for your primary care physician (PCP), Toyin Watson MD, while you were hospitalized. If you have any questions or concerns related to this hospitalization, you may contact us at [phone]. For follow up as well as any medication refills, we recommend that you follow up with your primary care physician. A registered nurse will reach out to you by phone within a few days after your discharge to answer any additional questions that you may have after going home.   However, at this time we provide for you here, the most important instructions / recommendations at discharge:     Notable Medication Adjustments -   Please start taking torsemide 5 mg every other day if your weight exceeds 172 pounds  Start taking cefpodoxime 100 mg once daily for 5 days for UTI  Stop taking olmesartan 40 mg  Stop spironolactone  Testing Required after Discharge -   Please ask your nephrologist or PCP to order a repeat BMP one week following discharge  Important follow up information -   Please keep your upcoming appointment with your PCP on Monday, 8/21/2023  Please make an appointment with [facility] Donita Aparicio within one week of discharge  Please follow-up with your urologist as indicated  Have been provided an ambulatory referral to physical and occupational therapy, please follow-up at your location of choice  Other Instructions -   Please continue to straight cath yourself for urinary retention  Please review this entire after visit summary as additional general instructions including medication list, appointments, activity, diet, any pertinent wound care, and other additional recommendations from your care team that may be provided for you.       Sincerely,     Adalberto Gosselin, DO

## 2023-08-17 NOTE — PROGRESS NOTES
[facility]  Progress Note  Name: Citlalli Thayer I  MRN: [id number]  Unit/Bed#: W -01 I Date of Admission: 8/16/2023   Date of Service: 8/17/2023 I Hospital Day: 1    Assessment/Plan   * ANDRA on CKD stage IV  Assessment & Plan  Lab Results   Component Value Date    CREATININE 3.50 (H) 08/17/2023    CREATININE 4.21 (H) 08/16/2023    CREATININE 4.86 (H) 08/16/2023       Lab Results   Component Value Date    EGFR 12 08/17/2023    EGFR 9 08/16/2023    EGFR 8 08/16/2023       • POA 4.21; (baseline baseline appears to be 1.8-2.3)  • UA:  Trace protein, 4-10 WBC  • UA microscopy 4-10 WBC  • History of neurogenic bladder and recurrent UTI  · Imaging:  CT without contrast shows 8/16/2023 1. No findings of acute abdominopelvic injury or acute abdominopelvic pathology. 2.  Nonobstructing left renal calculi measuring up to 0.6 cm without hydroureteronephrosis.   • Likely prerenal in the setting of poor p.o. intake/hydration as well  • Improving as of 8/17/23, Cr 3.5    Plan:  • Nephrology on board, continue to follow recs  • Urinary retention protocol, Bladder scan, Daily weights, I/O  •  IV Fluids: Isolyte 75 cc/h  • Monitor BMP daily and observe for downward trend of creatinine  • Avoid hypoperfusion of the kidneys, minimize nephrotoxins  RAAS Blockers/Diuretics held: Holding torsemide, olmesartan      AMS (altered mental status)  Assessment & Plan  Assessment   · POA confused  · Likely in the setting of acute infection, toxic metabolic encephalopathy due to to ANDRA  Advanced age  · Patient currently not confused anymore as of 8/17/2023    Plan  · Delirium precautions  -Initiate delirium precautions  -maintain normal sleep/wake cycle  -minimize overnight interruptions, group overnight vitals/labs/nursing checks as possible  -dim lights, close blinds and turn off tv to minimize stimulation and encourage sleep environment in evenings  -StartTylenol 975mg Q8H prn  -monitor for fecal and urinary retention which may precipitate delirium  -encourage early mobilization and ambulation  -provide frequent reorientation and redirection  -encourage family and friends at the bedside to help help calm patient if anxious  -avoid medications which may precipitate or worsen delirium such as tramadol, benzodiazepine, anticholinergics, and benadryl  -encourage hydration and nutrition   -redirect unwanted behaviors as first line      Urinary retention  Assessment & Plan  Assessment  · Appears to have recurrent UTI,  secondary to neurogenic bladder  · 5/23 patient had Enterobacter cloacae-CRE, susceptible to cefepime, ciprofloxacin, gentamicin, levofloxacin, meropenem  · 6/23 grew lactobacillus cloacae no sensitivity studies noted  · 8/9 was noted to have Serratia marcescens- ESBL  · Recently patient had Serratia marcescens, on cefdinir by urologist, started on 8/14  · Patient has continued urgency and hesitancy   · Urine culture positive for Serratia this admission  · Per ID, symptons likely due to urinary retention rather than UTI as she does not have leukocytosis, fever, etc.     Plan  · Stop ertapenem per ID   · Urinary retention protocol  · Urology consult on board, appreciate recs  · Straight cath     Postural dizziness with presyncope  Assessment & Plan  Patient notes some lightheadedness  Patient given 1 L in the emergency department  Measure orthostatic blood pressures  Continue IV fluid    Anemia  Assessment & Plan  Recent Labs     08/16/23  1611 08/17/23  0544   HGB 11.1* 11.5   MCV 86 86   7/5, ferritin 7, iron sat 8, TIBC 426, iron 26 some component of DOMI  Some component of CKD      Plan:  • Monitor CBC every day  • Transfuse if Hb < 7  • Monitor for now      Hyponatremia  Assessment & Plan  Recent Labs     08/16/23  0703 08/16/23  1701 08/17/23  0544   SODIUM 133* 128* 135     No results found for: "OSMOUA", "Josem Job", "OSMOLALITSER"    Workup:  · Hypovolemic/volume depletion  · Received 1 L sodium chloride bolus in the emergency department  · Sodium 135 as of 8/17/2023  · Resolved      Plan:   · Continue Isolyte 75 ml per hour  · Continue to monitor with daily BMP      Hypothyroid  Assessment & Plan  Assessment  · 8/16 recent TSH 0.89    Plan  · Continue levothyroxine 112 mcg daily    Essential hypertension  Assessment & Plan  Assessment  · Patient takes amlodipine 5 mg daily, olmesartan 40 mg daily, torsemide 5 mg daily, metoprolol 50 mg daily    Plan  · Hold on losartan and torsemide as noted above in the setting of ANDRA  · Continue amlodipine 5 mg and metoprolol 50 mg      Type 2 diabetes mellitus with diabetic chronic kidney disease (HCC)  Assessment & Plan  Lab Results   Component Value Date    HGBA1C 6.8 (H) 03/07/2023       Recent Labs     08/16/23  1949   POCGLU 352*       Blood Sugar Average: Last 72 hrs:  (P) 352Home medications insulin aspart 3 times daily before meals  16 units breakfast and lunch, 25 units at dinner  15 units detemir every evening  Diabetic diet  Sliding scale      Hypertriglyceridemia  Assessment & Plan  Assessment  · Triglycerides on admission, 476 - decreased from previous of 600    Plan  · Continue rosuvastatin 5 mg daily    Stage 4 chronic kidney disease [facility])  Assessment & Plan  Lab Results   Component Value Date    EGFR 12 08/17/2023    EGFR 9 08/16/2023    EGFR 8 08/16/2023    CREATININE 3.50 (H) 08/17/2023    CREATININE 4.21 (H) 08/16/2023    CREATININE 4.86 (H) 08/16/2023   Nephrology on board  Plan noted above under ANDRA on CKD             VTE Pharmacologic Prophylaxis: VTE Score: 4 Moderate Risk (Score 3-4) - Pharmacological DVT Prophylaxis Ordered: heparin. Patient Centered Rounds: I performed bedside rounds with nursing staff today. Discussions with Specialists or Other Care Team Provider: Nephrology, infectious disease, urology, attending physician, senior resident    Education and Discussions with Family / Patient: Patient declined call to .      Total Time Spent on Date of Encounter in care of patient: 25 minutes This time was spent on one or more of the following: performing physical exam; counseling and coordination of care; obtaining or reviewing history; documenting in the medical record; reviewing/ordering tests, medications or procedures; communicating with other healthcare professionals and discussing with patient's family/caregivers. Current Length of Stay: 1 day(s)  Current Patient Status: Inpatient   Certification Statement: The patient will continue to require additional inpatient hospital stay due to ANDRA on CKD, urinary retention  Discharge Plan: Anticipate discharge in 24-48 hrs to home with home services. Code Status: Level 3 - DNAR and DNI    Subjective:   Per night team, no acute overnight events. Patient's mental status has improved and she was AAOx3 at the time of my evaluation. She continues to endorse urinary hesitancy and urgency, denies fever, chills, hematuria, diarrhea, or constipation. Objective:     Vitals:   Temp (24hrs), Av.5 °F (29.7 °C), Min:37.4 °F (3 °C), Max:97.6 °F (36.4 °C)    Temp:  [37.4 °F (3 °C)-97.6 °F (36.4 °C)] 37.4 °F (3 °C)  HR:  [57-68] 57  Resp:  [17-18] 17  BP: (118-134)/(62-72) 133/63  SpO2:  [93 %-99 %] 93 %  Body mass index is 32.5 kg/m². Input and Output Summary (last 24 hours): Intake/Output Summary (Last 24 hours) at 2023 1319  Last data filed at 2023 1017  Gross per 24 hour   Intake --   Output 2250 ml   Net -2250 ml       Physical Exam:   Physical Exam  Vitals and nursing note reviewed. Constitutional:       General: She is not in acute distress. Appearance: She is well-developed. She is obese. Comments: Patient appears dry   HENT:      Head: Normocephalic and atraumatic.       Nose: Nose normal.      Mouth/Throat:      Mouth: Mucous membranes are moist.   Eyes:      Conjunctiva/sclera: Conjunctivae normal.   Cardiovascular:      Rate and Rhythm: Normal rate and regular rhythm. Heart sounds: No murmur heard. Pulmonary:      Effort: Pulmonary effort is normal. No respiratory distress. Breath sounds: Normal breath sounds. Abdominal:      Palpations: Abdomen is soft. Tenderness: There is no abdominal tenderness. Musculoskeletal:      Cervical back: Neck supple. Right lower leg: No edema. Left lower leg: No edema. Skin:     General: Skin is warm and dry. Capillary Refill: Capillary refill takes less than 2 seconds. Neurological:      General: No focal deficit present. Mental Status: She is alert and oriented to person, place, and time.       Comments: Oriented x4   Psychiatric:         Mood and Affect: Mood normal.           Additional Data:     Labs:  Results from last 7 days   Lab Units 08/17/23  0544   WBC Thousand/uL 7.71   HEMOGLOBIN g/dL 11.5   HEMATOCRIT % 35.5   PLATELETS Thousands/uL 283   NEUTROS PCT % 60   LYMPHS PCT % 28   MONOS PCT % 8   EOS PCT % 3     Results from last 7 days   Lab Units 08/17/23  0544 08/16/23  1701   SODIUM mmol/L 135 128*   POTASSIUM mmol/L 4.7 5.1   CHLORIDE mmol/L 104 97   CO2 mmol/L 24 21   BUN mg/dL 74* 85*   CREATININE mg/dL 3.50* 4.21*   ANION GAP mmol/L 7 10   CALCIUM mg/dL 9.3 8.9   ALBUMIN g/dL  --  4.0   TOTAL BILIRUBIN mg/dL  --  0.28   ALK PHOS U/L  --  58   ALT U/L  --  14   AST U/L  --  17   GLUCOSE RANDOM mg/dL 177* 216*     Results from last 7 days   Lab Units 08/16/23  1611   INR  0.87     Results from last 7 days   Lab Units 08/17/23  1106 08/17/23  0722 08/16/23  1949   POC GLUCOSE mg/dl 128 186* 352*         Results from last 7 days   Lab Units 08/16/23  1611   LACTIC ACID mmol/L 1.1       Lines/Drains:  Invasive Devices     Peripheral Intravenous Line  Duration           Peripheral IV 08/16/23 Left Antecubital <1 day    Peripheral IV 08/17/23 Right;Ventral (anterior) Forearm <1 day                      Imaging: Reviewed radiology reports from this admission including: abdominal/pelvic CT    Recent Cultures (last 7 days):   Results from last 7 days   Lab Units 08/16/23  0703   URINE CULTURE  30,000-39,000 cfu/ml Serratia marcescens*       Last 24 Hours Medication List:   Current Facility-Administered Medications   Medication Dose Route Frequency Provider Last Rate   • acetaminophen  975 mg Oral Q8H PRN Pinky Traore MD     • albuterol  2 puff Inhalation Q6H PRN Pinky Traore MD     • amLODIPine  5 mg Oral Daily Bushra Rucker MD     • atorvastatin  10 mg Oral Daily With Brenna Rhodes MD     • calcium carbonate  1 tablet Oral TID With Meals Pinky Traore MD     • co-enzyme Q-10  100 mg Oral Daily Pinky Traore MD     • DULoxetine  60 mg Oral Daily Pinky Traore MD     • famotidine  10 mg Oral Daily Pinky Traore MD     • fish oil  1,000 mg Oral BID Pinky Traore MD     • gabapentin  300 mg Oral HS Pinky Traore MD     • heparin (porcine)  5,000 Units Subcutaneous [facility] Pinky Traore MD     • insulin detemir  50 Units Subcutaneous QPM Pinky Traore MD     • insulin lispro  16 Units Subcutaneous BID before breakfast/lunch Pinky Traore MD     • insulin lispro  25 Units Subcutaneous Before Brenna Rhodes MD     • levothyroxine  112 mcg Oral QAM Pinky Traore MD     • metoprolol tartrate  50 mg Oral Q12H [facility], MD     • multi-electrolyte  75 mL/hr Intravenous Continuous Bushra Rucker MD 75 mL/hr (08/17/23 0735)   • pantoprazole  40 mg Oral BID AC Pinky Traore MD     • pramipexole  0.25 mg Oral HS Pinky Traore MD     • saccharomyces boulardii  250 mg Oral BID Pinky Traore MD          Today, Patient Was Seen By: Adalberto Gosselin, DO    **Please Note: This note may have been constructed using a voice recognition system. **

## 2023-08-17 NOTE — ASSESSMENT & PLAN NOTE
Assessment  · Appears to have recurrent UTI,  secondary to neurogenic bladder  · 5/23 patient had Enterobacter cloacae-CRE, susceptible to cefepime, ciprofloxacin, gentamicin, levofloxacin, meropenem  · 6/23 grew lactobacillus cloacae no sensitivity studies noted  · 8/9 was noted to have Serratia marcescens- ESBL  · Recently patient had Serratia marcescens, on cefdinir by urologist, started on 8/14  · Patient has continued urgency and hesitancy   · Urine culture positive for Serratia  · Per ID, symptons likely due to urinary retention rather than UTI as she does not have leukocytosis, fever, etc.     Plan  Stop ertapenem per ID   Urology consult on board, appreciate recs  Straight cath

## 2023-08-17 NOTE — ASSESSMENT & PLAN NOTE
Patient notes some lightheadedness  Patient given 1 L in the emergency department      Plan  · Continue to monitor  · Fall precautions in place

## 2023-08-17 NOTE — PROGRESS NOTES
[address] [facility] PROGRESS NOTE   Danika Spaulding 75 y.o. female MRN: [id number]  Unit/Bed#: W -01 Encounter: [id number]  Reason for Consult: ANDRA on CKD IV    ASSESSMENT and PLAN:    69-year-old female with a past medical history of CKD stage IV, neurogenic bladder requiring straight catheterization 1 time daily at home, recurrent UTI, nephrolithiasis, hypertension, hyperlipidemia, hypothyroidism, GERD who initially presents with abnormal lab work from baseline by outpatient nephrologist.  Nephrology is on board for acute kidney injury. Patient was reportedly on cefdinir as outpatient but only took 1 dose on August 15. Patient also had a fall prior to admission. Also has had poor p.o. intake at baseline recently. 1- acute on chronic kidney injury stage IV present on admission    - Baseline creatinine 1.5 to 2 mg/dL  - Outpatient nephrologist Dr. Ryne Reyes  - Pre-admission creatinine August 16 is 4.9 mg/dL and patient was sent to the hospital for evaluation  - Prior UPEP unrevealing, SPEP unrevealing, kappa lambda ratio unrevealing  - Had noted rising proteinuria in the setting of acute kidney injury  - There was question of UTI as outpatient. Also has outpatient spironolactone was placed on hold given rising creatinine and therefore patient had repeat lab work on August 16 with worsening continued creatinine and therefore was sent to the hospital  - Initial CT scan on August 16-no finding of acute abdominopelvic injury, nonobstructive left calculi, no hydroureteronephrosis  - Recent nuclear medicine scan on June 2023 of cardiac-EF 63%  - Urinalysis-4-10 WBC  - Etiology-possibly in the setting of infection/poor p.o. intake/ARB/relative hypotension/autoregulatory failure leading to ATN  - Initially ARB was held, patient was started on broad-spectrum antibiotic by primary team, and ARB was held  - August 17-creatinine slowly improving 3.5 mg/dL. Give intravenous fluids. Continue holding ARB.     Plan  - Hold diuretic  - We will need to check UPCR in steady state  - Hold ARB  - Give intravenous fluids today  - I/os; avoid nephrotoxic agents  - Check lab work in a.m.  - Change hold parameters on amlodipine to avoid hypotension  - Gabapentin dose is okay for now  - Check RGL-youtlv-UDS is 120 cc  - Follow-up urine culture  - Antibiotics per primary and infectious disease team  - Reviewed case directly with primary team resident we are in agreement to initiate intravenous fluids. 2-Electrolytes- sodium level has normalized to 135. This is not a rapid rise based on initial sodium level. Phosphorus is slightly elevated will monitor for now. 3-acid/base- appropriate    4- infection?-possible urinary source. Patient has a history of recurrent UTI with neurogenic bladder requiring straight catheterizations at home    - Prior urine culture with Serratia  - Infectious disease team on board  - Check PVR every shift    5- hypertension- blood pressure is appropriate August 17    - Blood pressures lower on arrival.  At home is on amlodipine, olmesartan, torsemide, metoprolol. Spironolactone was held as of August 10 due to rising creatinine as outpatient  - Hold spironolactone, hold ARB, hold torsemide  - Patient appears slightly hypovolemic and will give intravenous fluids as above  - Continue amlodipine but change hold parameters  - Continue metoprolol with hold parameters    6- anemia-patient has a history of iron deficiency anemia. Avoid IV iron due to concern for infection    7-hypothyroidism-per primary team    8-recurrent falls-per primary team.  No acute trauma on CT of the abdomen pelvis noted on report. 9-change in mental state- Per my exam today and compared to notes on admission, patient appears to be more awake today. I reviewed the case with primary team resident directly but the patient is new to the primary team resident so we are going off of the current notes.   Patient clinically appears to be improving with regards to mental state but still does not appear to be back to baseline. SUBJECTIVE / 24H INTERVAL HISTORY:  Patient denies complaints today. States still feeling weak. On my interpretation of the interview, patient appears confused at times but is easily redirectable (it is very minimal but for example, the patient will repeat questions even after I explained in different ways and I am not sure if the patient is truly understanding even though she is saying she is). OBJECTIVE:  Current Weight: Weight - Scale: 80.6 kg (177 lb 11.1 oz)  Vitals:    08/16/23 1917 08/16/23 2121 08/17/23 0600 08/17/23 0725   BP: 134/72 122/62  133/63   BP Location: Right arm Right arm     Pulse: 65 63  57   Resp: 18 18 17   Temp: (!) 97.4 °F (36.3 °C) 97.6 °F (36.4 °C)  97.5 °F (36.4 °C)   TempSrc: Oral Oral     SpO2: 97% 94%  93%   Weight: 80.1 kg (176 lb 9.4 oz)  80.6 kg (177 lb 11.1 oz)        Intake/Output Summary (Last 24 hours) at 8/17/2023 1032  Last data filed at 8/17/2023 1017  Gross per 24 hour   Intake --   Output 2250 ml   Net -2250 ml     General: NAD  Skin: no rash  Eyes: anicteric sclera  ENT: moist mucous membrane  Neck: supple  Chest: CTA b/l, no ronchii, no wheeze, no rubs, no rales  CVS: s1s2, no murmur, no gallop, no rub  Abdomen: soft, nontender, nl sounds  Extremities: no significant pitting edema LE b/l  : no ang  Neuro: AAOX3?   Psych: normal affect    Medications:    Current Facility-Administered Medications:   •  acetaminophen (TYLENOL) tablet 975 mg, 975 mg, Oral, Q8H PRN, Jennifer Olea MD  •  albuterol (PROVENTIL HFA,VENTOLIN HFA) inhaler 2 puff, 2 puff, Inhalation, Q6H PRN, Jennifer Olea MD  •  amLODIPine (NORVASC) tablet 5 mg, 5 mg, Oral, Daily, Emerald Vargas MD, 5 mg at 08/17/23 4548  •  atorvastatin (LIPITOR) tablet 10 mg, 10 mg, Oral, Daily With Isabel Desouza MD, 10 mg at 08/16/23 2031  •  calcium carbonate (OYSTER SHELL,OSCAL) 500 mg tablet 1 tablet, 1 tablet, Oral, TID With Meals, Missy Gutierrez MD, 1 tablet at 08/17/23 9858  •  co-enzyme Q-10 capsule 100 mg, 100 mg, Oral, Daily, Missy Gutierrez MD, 100 mg at 08/17/23 8306  •  DULoxetine (CYMBALTA) delayed release capsule 60 mg, 60 mg, Oral, Daily, Missy Gutierrez MD, 60 mg at 08/17/23 0736  •  famotidine (PEPCID) tablet 10 mg, 10 mg, Oral, Daily, Missy Gutierrez MD, 10 mg at 08/17/23 4882  •  fish oil capsule 1,000 mg, 1,000 mg, Oral, BID, Missy Gutierrez MD, 1,000 mg at 08/17/23 5256  •  gabapentin (NEURONTIN) capsule 300 mg, 300 mg, Oral, HS, Missy Gutierrez MD, 300 mg at 08/16/23 2045  •  heparin (porcine) subcutaneous injection 5,000 Units, 5,000 Units, Subcutaneous, Q8H [address], Missy Gutierrez MD, 5,000 Units at 08/17/23 0538  •  insulin detemir (LEVEMIR) subcutaneous injection 50 Units, 50 Units, Subcutaneous, QPM, Missy Gutierrez MD, 50 Units at 08/16/23 2031  •  insulin lispro (HumaLOG) 100 units/mL subcutaneous injection 16 Units, 16 Units, Subcutaneous, BID before breakfast/lunch, Missy Gutierrez MD, 16 Units at 08/17/23 0740  •  insulin lispro (HumaLOG) 100 units/mL subcutaneous injection 25 Units, 25 Units, Subcutaneous, Before Grant Webb MD  •  levothyroxine tablet 112 mcg, 112 mcg, Oral, QAM, Missy Gutierrez MD, 112 mcg at 08/17/23 8674  •  metoprolol tartrate (LOPRESSOR) tablet 50 mg, 50 mg, Oral, Q12H [address], Missy Gutierrez MD, 50 mg at 08/17/23 0737  •  multi-electrolyte (PLASMALYTE-A/ISOLYTE-S PH 7.4) IV solution, 75 mL/hr, Intravenous, Continuous, Thais Ross MD, Last Rate: 75 mL/hr at 08/17/23 0735, 75 mL/hr at 08/17/23 0735  •  pantoprazole (PROTONIX) EC tablet 40 mg, 40 mg, Oral, BID AC, Missy Gutierrez MD, 40 mg at 08/17/23 9559  •  pramipexole (MIRAPEX) tablet 0.25 mg, 0.25 mg, Oral, HS, Missy Gutierrez MD, 0.25 mg at 08/16/23 2045  •  saccharomyces boulardii (FLORASTOR) capsule 250 mg, 250 mg, Oral, BID, Aaron Woods MD, 250 mg at 08/17/23 0270    Laboratory Results:  Results from last 7 days   Lab Units 08/17/23  0544 08/16/23  1701 08/16/23  1611 08/16/23  0703   WBC Thousand/uL 7.71  --  9.67  --    HEMOGLOBIN g/dL 11.5  --  11.1*  --    HEMATOCRIT % 35.5  --  34.3*  --    PLATELETS Thousands/uL 283  --  269  --    POTASSIUM mmol/L 4.7 5.1  --  4.8   CHLORIDE mmol/L 104 97  --  102   CO2 mmol/L 24 21  --  21   BUN mg/dL 74* 85*  --  84*   CREATININE mg/dL 3.50* 4.21*  --  4.86*   CALCIUM mg/dL 9.3 8.9  --  9.7   MAGNESIUM mg/dL 2.1  --   --   --    PHOSPHORUS mg/dL 4.2*  --   --   --

## 2023-08-17 NOTE — ASSESSMENT & PLAN NOTE
Recent Labs     08/16/23  0703 08/16/23  1701 08/17/23  0544   SODIUM 133* 128* 135     No results found for: "OSMOUA", "Leamon Promise", "OSMOLALITSER"    Workup:  · Hypovolemic/volume depletion  · Received 1 L sodium chloride bolus in the emergency department  · Sodium 135 as of 8/17/2023  · Resolved      Plan:   · Continue to monitor with daily BMP

## 2023-08-17 NOTE — ASSESSMENT & PLAN NOTE
Lab Results   Component Value Date    CREATININE 3.50 (H) 08/17/2023    CREATININE 4.21 (H) 08/16/2023    CREATININE 4.86 (H) 08/16/2023       Lab Results   Component Value Date    EGFR 12 08/17/2023    EGFR 9 08/16/2023    EGFR 8 08/16/2023       • POA 4.21; (baseline baseline appears to be 1.8-2.3)  • UA:  Trace protein, 4-10 WBC  • UA microscopy 4-10 WBC  • History of neurogenic bladder and recurrent UTI  · Imaging:  CT without contrast shows 8/16/2023 1. No findings of acute abdominopelvic injury or acute abdominopelvic pathology. 2.  Nonobstructing left renal calculi measuring up to 0.6 cm without hydroureteronephrosis.   • Likely prerenal in the setting of poor p.o. intake/hydration as well  • Improving as of 8/17/23, Cr 3.5    Plan:  • Nephrology on board, continue to follow recs  • Urinary retention protocol, Bladder scan, Daily weights, I/O  •  IV Fluids: Isolyte 75 cc/h  • Monitor BMP daily and observe for downward trend of creatinine  • Avoid hypoperfusion of the kidneys, minimize nephrotoxins  RAAS Blockers/Diuretics held: Holding torsemide, olmesartan

## 2023-08-17 NOTE — PLAN OF CARE
Problem: MOBILITY - ADULT  Goal: Maintain or return to baseline ADL function  Description: INTERVENTIONS:  -  Assess patient's ability to carry out ADLs; assess patient's baseline for ADL function and identify physical deficits which impact ability to perform ADLs (bathing, care of mouth/teeth, toileting, grooming, dressing, etc.)  - Assess/evaluate cause of self-care deficits   - Assess range of motion  - Assess patient's mobility; develop plan if impaired  - Assess patient's need for assistive devices and provide as appropriate  - Encourage maximum independence but intervene and supervise when necessary  - Involve family in performance of ADLs  - Assess for home care needs following discharge   - Consider OT consult to assist with ADL evaluation and planning for discharge  - Provide patient education as appropriate  Outcome: Progressing  Goal: Maintains/Returns to pre admission functional level  Description: INTERVENTIONS:  - Perform BMAT or MOVE assessment daily.   - Set and communicate daily mobility goal to care team and patient/family/caregiver. - Collaborate with rehabilitation services on mobility goals if consulted  - Perform Range of Motion  times a day. - Reposition patient every  hours.   - Dangle patient  times a day  - Stand patient  times a day  - Ambulate patient  times a day  - Out of bed to chair  times a day   - Out of bed for meals times a day  - Out of bed for toileting  - Record patient progress and toleration of activity level   Outcome: Progressing     Problem: GENITOURINARY - ADULT  Goal: Maintains or returns to baseline urinary function  Description: INTERVENTIONS:  - Assess urinary function  - Encourage oral fluids to ensure adequate hydration if ordered  - Administer IV fluids as ordered to ensure adequate hydration  - Administer ordered medications as needed  - Offer frequent toileting  - Follow urinary retention protocol if ordered  Outcome: Progressing  Goal: Absence of urinary retention  Description: INTERVENTIONS:  - Assess patient’s ability to void and empty bladder  - Monitor I/O  - Bladder scan as needed  - Discuss with physician/AP medications to alleviate retention as needed  - Discuss catheterization for long term situations as appropriate  Outcome: Progressing     Problem: METABOLIC, FLUID AND ELECTROLYTES - ADULT  Goal: Glucose maintained within target range  Description: INTERVENTIONS:  - Monitor Blood Glucose as ordered  - Assess for signs and symptoms of hyperglycemia and hypoglycemia  - Administer ordered medications to maintain glucose within target range  - Assess nutritional intake and initiate nutrition service referral as needed  Outcome: Progressing     Problem: SAFETY ADULT  Goal: Patient will remain free of falls  Description: INTERVENTIONS:  - Educate patient/family on patient safety including physical limitations  - Instruct patient to call for assistance with activity   - Consult OT/PT to assist with strengthening/mobility   - Keep Call bell within reach  - Keep bed low and locked with side rails adjusted as appropriate  - Keep care items and personal belongings within reach  - Initiate and maintain comfort rounds  - Make Fall Risk Sign visible to staff  - Offer Toileting every  Hours, in advance of need  - Initiate/Maintain alarm  - Obtain necessary fall risk management equipment:   - Apply yellow socks and bracelet for high fall risk patients  - Consider moving patient to room near nurses station  Outcome: Progressing

## 2023-08-17 NOTE — ASSESSMENT & PLAN NOTE
Recent Labs     08/16/23  0703 08/16/23  1701 08/17/23  0544   SODIUM 133* 128* 135     No results found for: "OSMOUA", "NAUR", "OSMOLALITSER"    Workup:  · Hypovolemic/volume depletion  · Received 1 L sodium chloride bolus in the emergency department  · Sodium 135 as of 8/17/2023  · Resolved      Plan:   · Continue Isolyte 75 ml per hour  · Continue to monitor with daily BMP

## 2023-08-17 NOTE — ASSESSMENT & PLAN NOTE
Recent Labs     08/16/23  1611 08/17/23  0544 08/18/23  0443   HGB 11.1* 11.5 10.5*   MCV 86 86 87   7/5, ferritin 7, iron sat 8, TIBC 426, iron 26 some component of DOMI  Some component of CKD  10.5 as of 8/18/2023, likely delusional      Plan:  • Monitor CBC every day  • Transfuse if Hb < 7  • Monitor for now

## 2023-08-17 NOTE — PLAN OF CARE
Problem: PHYSICAL THERAPY ADULT  Goal: Performs mobility at highest level of function for planned discharge setting. See evaluation for individualized goals. Description: Treatment/Interventions: Functional transfer training, LE strengthening/ROM, Therapeutic exercise, Endurance training, Equipment eval/education, Bed mobility, Patient/family training, Gait training, Compensatory technique education  Equipment Recommended: Myles Burdick       See flowsheet documentation for full assessment, interventions and recommendations. 8/17/2023 1301 by Gonzalez Du PT  Note: Prognosis: Fair  Problem List: Decreased strength, Decreased endurance, Impaired balance, Decreased mobility, Decreased cognition, Impaired judgement, Decreased safety awareness, Pain  Assessment: Cait Nunez is a 75 y.o. Female who [facility]23 due to possible UTI and diagnosis of ANDRA on CKD stage IV. Orders for PT eval and treat received, w/ activity orders of up and OOB as tolerated and contact isolation precautions. Comorbidities affecting pt's functional mobility at time of evaluation include: CKD, DM, anemia, fibromyalgia, osteoporosis, memory changes. Personal factors affecting DC include: ambulating w/ assistive device, decreased cognition, positive fall history and limited insight into impairments. At baseline, pt mobilizes independently w/ walker, and w/ 3 fall(s) in the previous 6 months. Upon evaluation, pt presents w/ the following deficits: impaired strength, impaired balance, impaired cognition, decreased safety awareness, pain affecting mobility and gait deviations. Pt currently requires supervision for transfers, supervision w/ RW for ambulation. Pt's clinical presentation is unstable/unpredictable due to abnormal lab values, pain affecting mobility tolerance, need to input for safety awareness, recent h/o falls, ongoing medical management.  From a PT/mobility standpoint given the above findings, DC recommendation is: Home w/ OPPT. During current admission, pt will benefit from continued skilled inpatient PT in the acute care setting in order to address the above deficits and to maximize function and mobility prior to DC from acute care. PT Discharge Recommendation: Home with outpatient rehabilitation    See flowsheet documentation for full assessment.

## 2023-08-17 NOTE — ASSESSMENT & PLAN NOTE
Assessment   · POA confused  · Likely in the setting of acute infection, toxic metabolic encephalopathy due to to ANDRA  Advanced age  · Patient currently not confused anymore as of 8/17/2023    Plan  · Delirium precautions  -Initiate delirium precautions  -maintain normal sleep/wake cycle  -minimize overnight interruptions, group overnight vitals/labs/nursing checks as possible  -dim lights, close blinds and turn off tv to minimize stimulation and encourage sleep environment in evenings  -StartTylenol 975mg Q8H prn  -monitor for fecal and urinary retention which may precipitate delirium  -encourage early mobilization and ambulation  -provide frequent reorientation and redirection  -encourage family and friends at the bedside to help help calm patient if anxious  -avoid medications which may precipitate or worsen delirium such as tramadol, benzodiazepine, anticholinergics, and benadryl  -encourage hydration and nutrition   -redirect unwanted behaviors as first line

## 2023-08-17 NOTE — ASSESSMENT & PLAN NOTE
Assessment  · Appears to have recurrent UTI,  secondary to neurogenic bladder  · 5/23 patient had Enterobacter cloacae-CRE, susceptible to cefepime, ciprofloxacin, gentamicin, levofloxacin, meropenem  · 6/23 grew lactobacillus cloacae no sensitivity studies noted  · 8/9 was noted to have Serratia marcescens- ESBL  · Recently patient had Serratia marcescens, on cefdinir by urologist, started on 8/14  · Patient has continued urgency and hesitancy   · Urine culture positive for Serratia this admission  · Per ID, symptons likely due to urinary retention rather than UTI as she does not have leukocytosis, fever, etc.     Plan  · Stop ertapenem per ID   · Urinary retention protocol  · Urology consult on board, appreciate recs  · Straight cath

## 2023-08-17 NOTE — ASSESSMENT & PLAN NOTE
Lab Results   Component Value Date    HGBA1C 6.8 (H) 03/07/2023       Recent Labs     08/1949   POCGLU 352*       Blood Sugar Average: Last 72 hrs:  (P) 352Home medications insulin aspart 3 times daily before meals  16 units breakfast and lunch, 25 units at dinner  15 units detemir every evening  Diabetic diet  Sliding scale

## 2023-08-17 NOTE — ASSESSMENT & PLAN NOTE
Lab Results   Component Value Date    EGFR 12 08/17/2023    EGFR 9 08/16/2023    EGFR 8 08/16/2023    CREATININE 3.50 (H) 08/17/2023    CREATININE 4.21 (H) 08/16/2023    CREATININE 4.86 (H) 08/16/2023   Nephrology on board  Plan noted above under ANDRA on CKD

## 2023-08-17 NOTE — ASSESSMENT & PLAN NOTE
Appears to have recurrent UTIs  5/23 patient had Enterobacter cloacae-CRE, susceptible to cefepime, ciprofloxacin, gentamicin, levofloxacin, meropenem  6/23 grew lactobacillus cloacae no sensitivity studies noted  8/9 was noted to have Serratia marcescens- ESBL  Recently patient had Serratia marcescens, on cefdinir by urologist, started on 8/14  Patient has no improvement of symptoms  Symptoms likely related to urinary retention however patient remains encephalopathic so feel inclined to treat in the outpatient setting    Plan  · Cefpodoxime 100 mg daily once discharged, renal dosing

## 2023-08-17 NOTE — CONSULTS
Consultation - Infectious Disease   Milan Rosales 75 y.o. female MRN: [id number]  Unit/Bed#: W -01 Encounter: [id number]      IMPRESSION & RECOMMENDATIONS:   1. Urinary retention. Suspect the patient is not having adequate decompression of her bladder resulting in symptoms of pressure and pain over the bladder area. Not convinced that the patient has an invasive UTI. She remains without fever or leukocytosis and CT scan does not show any inflammatory changes of the bladder. Repeat urinalysis with only 4-10 white cells noted  -Discontinue ertapenem  -Monitor off all antibiotics  -Monitor for amount of retention and adjust the frequency of intermittent catheterization as needed  -Consult urology  -Follow-up urine culture    2. Acute kidney injury. Complicating chronic kidney disease stage IV. Possibly a prerenal issue. Possibly related to nonsteroidal use. Possibly related to hypertensive disease with progression  -Recheck BMP  -Volume management  -Close nephrology follow-up    3. Acute encephalopathy. Suspect related to metabolic issues. No other clear source appreciated. Doubt secondary to an invasive UTI. -Management of metabolic issues  -Monitor cognition  -Additional work-up as needed    4. Diabetes mellitus. Type II. With a reasonable hemoglobin A1c of 6.8. Certainly a risk factor for recurrent infection.     Have discussed the above management plan in detail with the primary service    Extensive review of the medical records in epic including review of the notes, radiographs, and laboratory results     HISTORY OF PRESENT ILLNESS:  Reason for Consult: Urinary tract infection  HPI: Milan Rosales is a 75y.o. year old female with stage IV chronic kidney disease, neurogenic bladder requiring intermittent catheterization, nephrolithiasis, recurrent UTI, diabetes mellitus admitted to [address] [place] [address] with ongoing full feeling bladder with pressure, and acute kidney injury we are asked to assist with antibiotic management. The patient struggled with neurogenic bladder requiring intermittent catheterizations once daily. However the patient tells me that she only does it every other day. She has been experiencing full and pressure sensation over her bladder and therefore had a urine culture done as an outpatient that grew Serratia that was sensitive to third-generation cephalosporins. She was treated with a course of cefdinir without much improvement. Because of her worsening renal failure she was sent to the ER for further evaluation. She denies having any fever chills or sweats, denies any nausea vomiting or diarrhea, denies any cough or shortness of breath. She is not having any dysuria or hematuria. REVIEW OF SYSTEMS:  A complete review of systems is negative other than that noted in the HPI.     PAST MEDICAL HISTORY:  Past Medical History:   Diagnosis Date   • Actinic keratosis    • Acute cystitis without hematuria 04/28/2023   • Acute-on-chronic kidney injury [facility])    • ANDRA (acute kidney injury) ([address]) 05/08/2020   • Allergic    • Allergic rhinitis    • Anesthesia     pt reports "had to use double lumen endo tube for hiatal hernia repair/so surgeon could get to where he needed to work"   • Arthritis    • Aspiration into airway    • Ahumada esophagus 1993    Lot of stress with children   • Basal cell carcinoma 2007    left cheek    • BCC (basal cell carcinoma) 05/27/2021    Left Nasal tip   • Breast discharge 6/19/2023   • Breast pain 6/19/2023   • Cancer ([address])     squamous cell cancer on forhead   • Cancer ([address])     basal cell on nose    • Cholelithiasis    • Chronic kidney disease 2000, 2018    Stones, kidney disease stage 4   • Chronic pain disorder     bilat feet and joint pain on occas   • Colon polyp    • Constipation    • COPD (chronic obstructive pulmonary disease) ([address])    • COVID-19 08/2021    recovered at home/did receive monoclonal infusion   • COVID-19 virus infection 08/16/2021   • Dental bridge present    • Depression    • Diabetes mellitus ([address])     Type 2   • Diabetic neuropathy (HCC)    • Disease of thyroid gland    • Dyspnea    • Elevated liver enzymes 4/4/2023   • Epigastric abdominal pain with severe diabetic gastroparesis, status post EGD/Botox injection, 5/14 5/17/2021   • Facial abrasion, initial encounter 3/22/2023   • Fall 03/22/2023   • Family history of thyroid problem    • Fatty liver    • Fracture of orbital floor, blow-out, right, closed, with routine healing, subsequent encounter 3/22/2023   • GERD (gastroesophageal reflux disease)    • Heart burn    • Hiatal hernia    • History of colon polyps 7/30/2021   • History of kidney stones 9/29/2020    Hx of recurrent kidney stones   • History of kidney stones 9/29/2020    Hx of recurrent kidney stones  Formatting of this note might be different from the original. Hx of recurrent kidney stones  Last Assessment & Plan:  Formatting of this note might be different from the original. We will set her up for follow-up in 3 months with a KUB prior. She knows to call if she has any kidney stone type pain in the meantime. • History of pneumonia    • History of repair of hiatal hernia 5/3/2020   • Hyperlipidemia    • Hyperplasia, parathyroid ([address])    • Hypertension    • Hypotension 04/13/2022   • Kidney problem    • Kidney stone    • Memory loss Julu2 2020   • Motion sickness    • Motion sickness    • Multiple closed fractures of ribs of right side 3/22/2023   • Nasal bones, closed fracture 3/22/2023   • Neck pain    • Obesity 1978   • Obesity (BMI 30.0-34. 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, left great toe fusion   • Urinary incontinence    • Urinary tract infection 03/28/2022   • Wears glasses      Past Surgical History:   Procedure Laterality Date   • ABDOMINAL SURGERY  [id number]    Nissen x2 1972 tubal ligarion   • ARTHROSCOPY KNEE     • BREAST BIOPSY      stereotactic-benign   • BREAST BIOPSY      stereo-benign   • BREAST CYST EXCISION Bilateral     benign   • BREAST EXCISIONAL BIOPSY      unknown date-benign   • BREAST EXCISIONAL BIOPSY      unknown date-benign   • BREAST EXCISIONAL BIOPSY      unknown date-benign   • BREAST EXCISIONAL BIOPSY      unknown age-benign   • CARDIAC CATHETERIZATION     • CHOLECYSTECTOMY     • COLONOSCOPY     • EXAMINATION UNDER ANESTHESIA N/A 06/24/2021    Procedure: EXAM UNDER ANESTHESIA (EUA), DISE;  Surgeon: Taco Lomax MD;  [facility];  Service: ENT   • HERNIA REPAIR     • HIATAL HERNIA REPAIR     • KNEE SURGERY      Torn maniscus lap surg   • LIPOSUCTION     • LYMPH NODE BIOPSY     • MOHS SURGERY  05/20/2021    left mid forehead-Gautam   • MOHS SURGERY Left 05/27/2021    Left nasal tip- gautam    • NV LIGATION/BIOPSY TEMPORAL ARTERY Left 01/05/2023    Procedure: BIOPSY ARTERY TEMPORAL;  Surgeon: Peyman Giles DO;  [facility];  Service: Vascular   • NV OPEN IMPLANTATION CRANIAL NERVE VASQUEZ & PULSE GEN N/A 11/10/2021    Procedure: INSERTION UPPER AIRWAY STIMULATOR, INSPIRE IMPLANT;  Surgeon: Taco Lomax MD;  [facility];  Service: ENT   • NV UNLISTED PROCEDURE FOOT/TOES Right 07/19/2022    Procedure: 1st metatarsal phalangeal joint fusion;  Surgeon: Valeria Snellen, DPM;  [facility];  Service: Podiatry   • REDUCTION MAMMAPLASTY Bilateral 2000   • REDUCTION MAMMAPLASTY     • SKIN BIOPSY     • SKIN CANCER EXCISION  2007    squamous cell carcinoma    • SKIN CANCER EXCISION  2007    basal cell carcinoma   • SQUAMOUS CELL CARCINOMA EXCISION     • TOE SURGERY Right 08/04/2022    Right Great Toe Fusion   • TONSILLECTOMY • TUBAL LIGATION     • UPPER GASTROINTESTINAL ENDOSCOPY     • US GUIDED BREAST BIOPSY RIGHT COMPLETE Right 2022       FAMILY HISTORY:  Non-contributory    SOCIAL HISTORY:  Social History   Social History     Substance and Sexual Activity   Alcohol Use Yes    Comment: 1 or 2 a year     Social History     Substance and Sexual Activity   Drug Use No     Social History     Tobacco Use   Smoking Status Former   • Packs/day: 2.00   • Years: 10.00   • Total pack years: 20.00   • Types: Cigarettes   • Start date: 1968   • Quit date: 1976   • Years since quittin.6   • Passive exposure: Past   Smokeless Tobacco Never   Tobacco Comments    Smoked 2 pack a day       ALLERGIES:  Allergies   Allergen Reactions   • Sulfamethoxazole-Trimethoprim Other (See Comments)     Tongue swelling   • Ciprofloxacin Hives and Itching       MEDICATIONS:  All current active medications have been reviewed.   Antibiotics: Ertapenem 2    PHYSICAL EXAM:  Temp:  [97.4 °F (36.3 °C)-97.6 °F (36.4 °C)] 97.5 °F (36.4 °C)  HR:  [57-68] 57  Resp:  [17-18] 17  BP: (118-134)/(62-72) 133/63  SpO2:  [93 %-99 %] 93 %  Temp (24hrs), Av.5 °F (36.4 °C), Min:97.4 °F (36.3 °C), Max:97.6 °F (36.4 °C)  Current: Temperature: 97.5 °F (36.4 °C)    Intake/Output Summary (Last 24 hours) at 2023 9920  Last data filed at 2023 0230  Gross per 24 hour   Intake --   Output 2100 ml   Net -2100 ml       General Appearance:  Appearing well, nontoxic, and in no distress   Head:  Normocephalic, without obvious abnormality, atraumatic   Eyes:  Conjunctiva pink and sclera anicteric, both eyes   Nose: Nares normal, mucosa normal, no drainage   Throat: Oropharynx moist without lesions   Neck: Supple, symmetrical, no adenopathy, no tenderness/mass/nodules   Back:   Symmetric, no curvature, ROM normal, no CVA tenderness   Lungs:   Clear to auscultation bilaterally, respirations unlabored   Chest Wall:  No tenderness or deformity   Heart:  RRR; no murmur, rub or gallop   Abdomen:   Soft, non-tender, non-distended, positive bowel sounds    Extremities: No cyanosis, clubbing or edema   Skin: No rashes or lesions. No draining wounds noted. Lymph nodes: Cervical, supraclavicular nodes normal   Neurologic: Alert and oriented times 3, extremity strength 5/5 and symmetric       LABS, IMAGING, & OTHER STUDIES:  Lab Results:  I have personally reviewed pertinent labs. Results from last 7 days   Lab Units 08/17/23  0544 08/16/23  1611   WBC Thousand/uL 7.71 9.67   HEMOGLOBIN g/dL 11.5 11.1*   PLATELETS Thousands/uL 283 269     Results from last 7 days   Lab Units 08/17/23  0544 08/16/23  1701 08/16/23  0703   SODIUM mmol/L 135 128* 133*   POTASSIUM mmol/L 4.7 5.1 4.8   CHLORIDE mmol/L 104 97 102   CO2 mmol/L 24 21 21   BUN mg/dL 74* 85* 84*   CREATININE mg/dL 3.50* 4.21* 4.86*   EGFR ml/min/1.73sq m 12 9 8   CALCIUM mg/dL 9.3 8.9 9.7   AST U/L  --  17  --    ALT U/L  --  14  --    ALK PHOS U/L  --  58  --      Results from last 7 days   Lab Units 08/16/23  0703   URINE CULTURE  30,000-39,000 cfu/ml Gram Negative Jack Enteric Like*                       Imaging Studies:     CT abdomen pelvis. No acute intra-abdominal process. Nonobstructing left renal calculi. Normal urinary bladder.     Images personally reviewed by me in PACS

## 2023-08-17 NOTE — ASSESSMENT & PLAN NOTE
Assessment  · Triglycerides on admission, 476 - decreased from previous of 600    Plan  · Continue rosuvastatin 5 mg daily

## 2023-08-18 ENCOUNTER — TELEPHONE (OUTPATIENT)
Dept: NEPHROLOGY | Facility: CLINIC | Age: 73
End: 2023-08-18

## 2023-08-18 DIAGNOSIS — N18.4 STAGE 4 CHRONIC KIDNEY DISEASE (HCC): Primary | ICD-10-CM

## 2023-08-18 LAB
ANION GAP SERPL CALCULATED.3IONS-SCNC: 9 MMOL/L
BACTERIA UR CULT: ABNORMAL
BUN SERPL-MCNC: 61 MG/DL (ref 5–25)
CALCIUM SERPL-MCNC: 8.7 MG/DL (ref 8.4–10.2)
CHLORIDE SERPL-SCNC: 105 MMOL/L (ref 96–108)
CO2 SERPL-SCNC: 22 MMOL/L (ref 21–32)
CREAT SERPL-MCNC: 2.6 MG/DL (ref 0.6–1.3)
ERYTHROCYTE [DISTWIDTH] IN BLOOD BY AUTOMATED COUNT: 16.4 % (ref 11.6–15.1)
GFR SERPL CREATININE-BSD FRML MDRD: 17 ML/MIN/1.73SQ M
GLUCOSE SERPL-MCNC: 103 MG/DL (ref 65–140)
GLUCOSE SERPL-MCNC: 135 MG/DL (ref 65–140)
GLUCOSE SERPL-MCNC: 160 MG/DL (ref 65–140)
GLUCOSE SERPL-MCNC: 184 MG/DL (ref 65–140)
GLUCOSE SERPL-MCNC: 213 MG/DL (ref 65–140)
GLUCOSE SERPL-MCNC: 58 MG/DL (ref 65–140)
HCT VFR BLD AUTO: 32.6 % (ref 34.8–46.1)
HGB BLD-MCNC: 10.5 G/DL (ref 11.5–15.4)
MAGNESIUM SERPL-MCNC: 2.2 MG/DL (ref 1.9–2.7)
MCH RBC QN AUTO: 27.9 PG (ref 26.8–34.3)
MCHC RBC AUTO-ENTMCNC: 32.2 G/DL (ref 31.4–37.4)
MCV RBC AUTO: 87 FL (ref 82–98)
PHOSPHATE SERPL-MCNC: 3.8 MG/DL (ref 2.3–4.1)
PLATELET # BLD AUTO: 266 THOUSANDS/UL (ref 149–390)
PMV BLD AUTO: 12 FL (ref 8.9–12.7)
POTASSIUM SERPL-SCNC: 4.6 MMOL/L (ref 3.5–5.3)
RBC # BLD AUTO: 3.76 MILLION/UL (ref 3.81–5.12)
SODIUM SERPL-SCNC: 136 MMOL/L (ref 135–147)
WBC # BLD AUTO: 7.72 THOUSAND/UL (ref 4.31–10.16)

## 2023-08-18 PROCEDURE — 99232 SBSQ HOSP IP/OBS MODERATE 35: CPT | Performed by: INTERNAL MEDICINE

## 2023-08-18 PROCEDURE — 82948 REAGENT STRIP/BLOOD GLUCOSE: CPT

## 2023-08-18 PROCEDURE — 80048 BASIC METABOLIC PNL TOTAL CA: CPT | Performed by: INTERNAL MEDICINE

## 2023-08-18 PROCEDURE — 85027 COMPLETE CBC AUTOMATED: CPT | Performed by: INTERNAL MEDICINE

## 2023-08-18 PROCEDURE — 84100 ASSAY OF PHOSPHORUS: CPT | Performed by: INTERNAL MEDICINE

## 2023-08-18 PROCEDURE — 83735 ASSAY OF MAGNESIUM: CPT | Performed by: INTERNAL MEDICINE

## 2023-08-18 RX ORDER — LOPERAMIDE HYDROCHLORIDE 2 MG/1
2 CAPSULE ORAL EVERY 4 HOURS PRN
Status: DISCONTINUED | OUTPATIENT
Start: 2023-08-18 | End: 2023-08-19 | Stop reason: HOSPADM

## 2023-08-18 RX ORDER — CHLORAL HYDRATE 500 MG
1000 CAPSULE ORAL 2 TIMES DAILY
Qty: 60 CAPSULE | Refills: 0 | Status: CANCELLED | OUTPATIENT
Start: 2023-08-18 | End: 2023-09-17

## 2023-08-18 RX ORDER — CEFPODOXIME PROXETIL 100 MG/1
100 TABLET, FILM COATED ORAL DAILY
Qty: 5 TABLET | Refills: 0 | Status: CANCELLED | OUTPATIENT
Start: 2023-08-19 | End: 2023-08-24

## 2023-08-18 RX ORDER — TORSEMIDE 10 MG/1
5 TABLET ORAL EVERY OTHER DAY
Qty: 23 TABLET | Refills: 0 | Status: CANCELLED | OUTPATIENT
Start: 2023-08-18 | End: 2023-11-16

## 2023-08-18 RX ADMIN — ACETAMINOPHEN 975 MG: 325 TABLET, FILM COATED ORAL at 09:32

## 2023-08-18 RX ADMIN — FAMOTIDINE 10 MG: 20 TABLET, FILM COATED ORAL at 09:19

## 2023-08-18 RX ADMIN — Medication 1 TABLET: at 17:06

## 2023-08-18 RX ADMIN — OMEGA-3 FATTY ACIDS CAP 1000 MG 1000 MG: 1000 CAP at 09:19

## 2023-08-18 RX ADMIN — Medication 250 MG: at 21:15

## 2023-08-18 RX ADMIN — INSULIN DETEMIR 50 UNITS: 100 INJECTION, SOLUTION SUBCUTANEOUS at 17:06

## 2023-08-18 RX ADMIN — Medication 1 TABLET: at 09:19

## 2023-08-18 RX ADMIN — SODIUM CHLORIDE, SODIUM GLUCONATE, SODIUM ACETATE, POTASSIUM CHLORIDE AND MAGNESIUM CHLORIDE 75 ML/HR: 526; 502; 368; 37; 30 INJECTION, SOLUTION INTRAVENOUS at 01:08

## 2023-08-18 RX ADMIN — HEPARIN SODIUM 5000 UNITS: 5000 INJECTION INTRAVENOUS; SUBCUTANEOUS at 21:15

## 2023-08-18 RX ADMIN — OMEGA-3 FATTY ACIDS CAP 1000 MG 1000 MG: 1000 CAP at 17:06

## 2023-08-18 RX ADMIN — METOPROLOL TARTRATE 50 MG: 50 TABLET, FILM COATED ORAL at 21:18

## 2023-08-18 RX ADMIN — DULOXETINE HYDROCHLORIDE 60 MG: 60 CAPSULE, DELAYED RELEASE ORAL at 09:19

## 2023-08-18 RX ADMIN — Medication 1 TABLET: at 11:32

## 2023-08-18 RX ADMIN — PRAMIPEXOLE DIHYDROCHLORIDE 0.25 MG: 0.25 TABLET ORAL at 21:15

## 2023-08-18 RX ADMIN — PANTOPRAZOLE SODIUM 40 MG: 40 TABLET, DELAYED RELEASE ORAL at 17:06

## 2023-08-18 RX ADMIN — LOPERAMIDE HYDROCHLORIDE 2 MG: 2 CAPSULE ORAL at 11:32

## 2023-08-18 RX ADMIN — INSULIN LISPRO 25 UNITS: 100 INJECTION, SOLUTION INTRAVENOUS; SUBCUTANEOUS at 17:10

## 2023-08-18 RX ADMIN — LEVOTHYROXINE SODIUM 112 MCG: 112 TABLET ORAL at 05:47

## 2023-08-18 RX ADMIN — HEPARIN SODIUM 5000 UNITS: 5000 INJECTION INTRAVENOUS; SUBCUTANEOUS at 05:46

## 2023-08-18 RX ADMIN — Medication 250 MG: at 09:21

## 2023-08-18 RX ADMIN — PANTOPRAZOLE SODIUM 40 MG: 40 TABLET, DELAYED RELEASE ORAL at 09:19

## 2023-08-18 RX ADMIN — ATORVASTATIN CALCIUM 10 MG: 10 TABLET, FILM COATED ORAL at 17:06

## 2023-08-18 RX ADMIN — Medication 100 MG: at 09:19

## 2023-08-18 RX ADMIN — LOPERAMIDE HYDROCHLORIDE 2 MG: 2 CAPSULE ORAL at 17:06

## 2023-08-18 RX ADMIN — METOPROLOL TARTRATE 50 MG: 50 TABLET, FILM COATED ORAL at 09:20

## 2023-08-18 RX ADMIN — HEPARIN SODIUM 5000 UNITS: 5000 INJECTION INTRAVENOUS; SUBCUTANEOUS at 17:06

## 2023-08-18 RX ADMIN — GABAPENTIN 300 MG: 300 CAPSULE ORAL at 21:15

## 2023-08-18 RX ADMIN — INSULIN LISPRO 16 UNITS: 100 INJECTION, SOLUTION INTRAVENOUS; SUBCUTANEOUS at 09:20

## 2023-08-18 NOTE — PROGRESS NOTES
[address] [facility] PROGRESS NOTE   Karen Munoz 75 y.o. female MRN: [id number]  Unit/Bed#: W -01 Encounter: [id number]  Reason for Consult: ANDRA on CKD    ASSESSMENT and PLAN:    34-year-old female with a past medical history of CKD stage IV, neurogenic bladder requiring straight catheterization 1 time daily at home, recurrent UTI, nephrolithiasis, hypertension, hyperlipidemia, hypothyroidism, GERD who initially presents with abnormal lab work from baseline by outpatient nephrologist.  Nephrology is on board for acute kidney injury. Patient was reportedly on cefdinir as outpatient but only took 1 dose on August 15. Patient also had a fall prior to admission. Also has had poor p.o. intake at baseline recently.     1- acute on chronic kidney injury stage IV present on admission     - Baseline creatinine 1.5 to 2 mg/dL  - Outpatient nephrologist Dr. Isabel Mensah  - Pre-admission creatinine August 16 is 4.9 mg/dL and patient was sent to the hospital for evaluation  - Prior UPEP unrevealing, SPEP unrevealing, kappa lambda ratio unrevealing  - Had noted rising proteinuria in the setting of acute kidney injury  - There was question of UTI as outpatient. Also has outpatient spironolactone was placed on hold given rising creatinine and therefore patient had repeat lab work on August 16 with worsening continued creatinine and therefore was sent to the hospital  - Initial CT scan on August 16-no finding of acute abdominopelvic injury, nonobstructive left calculi, no hydroureteronephrosis  - Recent nuclear medicine scan on June 2023 of cardiac-EF 63%  - Urinalysis-4-10 WBC  - Etiology-possibly in the setting of infection/poor p.o. intake/ARB/relative hypotension/autoregulatory failure leading to ATN  - Initially ARB was held, patient was started on broad-spectrum antibiotic by primary team, and ARB was held  - August 17-creatinine slowly improving 3.5 mg/dL. Give intravenous fluids. Continue holding ARB.   - August 18-creatinine improving to 2.6 mg/dL. Electrolytes are stable and appropriate. Hemoglobin is decreasing 10.5 but may be dilutional.    Plan  - Hold diuretic for now. Could consider restarting torsemide half tablet every other day on discharge  - Continue holding ARB for now and we will hold on discharge also  - Increase amlodipine to twice daily 5 mg if the patient's blood pressures consistently rise above 083 systolic  - We will need BMP in 1 week after discharge  - We will need outpatient renal [id number] sent to the office  - From renal standpoint would monitor inpatient 1 more day as we will continue fluids for 6 more hours and then hold and recheck lab work in the morning. If renal function continues to improve, could consider potential disposition per primary team tomorrow. Still continues to be slightly encephalopathic and this could delay discharge  - We will need to check UPCR in steady state-can be as outpatient  - I/os; avoid nephrotoxic agents  - Check lab work in a.m.  -Trend PVR every shift. Straight cath management per urology team  - Reviewed with primary team resident we are in agreement with plans to hold intravenous fluids today after 6 hours and BMP in a.m. 2-Electrolytes- sodium level appropriate 136. potassium appropriate     3-acid/base- appropriate     4- infection?-possible urinary source but appears less likely. Patient has a history of recurrent UTI with neurogenic bladder requiring straight catheterizations at home     - Prior urine culture with Serratia  - Infectious disease team on board-less likely true infection. Antibiotics held  - Check PVR every shift     5- hypertension- blood pressure is appropriate August 17     - Blood pressures lower on arrival.  At home is on amlodipine, olmesartan, torsemide, metoprolol.   Spironolactone was held as of August 10 due to rising creatinine as outpatient  - Hold spironolactone, hold ARB, hold torsemide  - Patient appears slightly hypovolemic and will give intravenous fluids as above  - Continue amlodipine but change hold parameters  - Continue metoprolol with hold parameters  - See discussion above     6- anemia-patient has a history of iron deficiency anemia. Avoid IV iron due to concern for infection     7-hypothyroidism-per primary team     8-recurrent falls-per primary team.  No acute trauma on CT of the abdomen pelvis noted on report.     9-change in mental state- patient overall appears to be improving but still feels slightly confused today. Is completely oriented though. She states she feels confused with her reasoning for being in the hospital but she states she understands after I discussed with her. Portions of the record may have been created with voice recognition software. Occasional wrong word or "sound a like" substitutions may have occurred due to the inherent limitations of voice recognition software. Read the chart carefully and recognize, using context, where substitutions have occurred. If you have any questions, please contact the dictating provider.         SUBJECTIVE / 24H INTERVAL HISTORY:  Patient states that she feels confused. She states she knows where she is and she is able to answer basic orientation questions without any issues. She just feels that she does not know why she is in the hospital and that is what she means by being confused. She denies discomfort, pain, shortness of breath. Blood pressures 758X systolic. Afebrile. On room air.     OBJECTIVE:  Current Weight: Weight - Scale: 80.6 kg (177 lb 11.1 oz)  Vitals:    08/17/23 0600 08/17/23 0725 08/17/23 1510 08/17/23 2201   BP:  133/63 135/75 133/73   BP Location:    Right arm   Pulse:  57 60 71   Resp:  17 16 18   Temp:  97.5 °F (36.4 °C) 98.1 °F (36.7 °C) 98 °F (36.7 °C)   TempSrc:    Oral   SpO2:  93% 93% 94%   Weight: 80.6 kg (177 lb 11.1 oz)          Intake/Output Summary (Last 24 hours) at 8/18/2023 3841  Last data filed at 8/18/2023 0127  Gross per 24 hour   Intake 5 ml   Output 2200 ml   Net -2195 ml     General: NAD  Skin: no rash  Eyes: anicteric sclera  ENT: moist mucous membrane  Neck: supple  Chest: CTA b/l, no ronchii, no wheeze, no rubs, no rales  CVS: s1s2, no murmur, no gallop, no rub  Abdomen: soft, nontender, nl sounds  Extremities: no edema LE b/l  : no ang  Neuro: AAOX3  Psych: normal affect    Medications:    Current Facility-Administered Medications:   •  acetaminophen (TYLENOL) tablet 975 mg, 975 mg, Oral, Q8H PRN, Azalia Berry MD, 975 mg at 08/17/23 1759  •  albuterol (PROVENTIL HFA,VENTOLIN HFA) inhaler 2 puff, 2 puff, Inhalation, Q6H PRN, Azalia Berry MD  •  amLODIPine (NORVASC) tablet 5 mg, 5 mg, Oral, Daily, Laci Martin MD, 5 mg at 08/17/23 3830  •  atorvastatin (LIPITOR) tablet 10 mg, 10 mg, Oral, Daily With Farheen Dodson MD, 10 mg at 08/17/23 1622  •  calcium carbonate (OYSTER SHELL,OSCAL) 500 mg tablet 1 tablet, 1 tablet, Oral, TID With Meals, Azalia Berry MD, 1 tablet at 08/17/23 1621  •  co-enzyme Q-10 capsule 100 mg, 100 mg, Oral, Daily, Azalia Berry MD, 100 mg at 08/17/23 1088  •  DULoxetine (CYMBALTA) delayed release capsule 60 mg, 60 mg, Oral, Daily, Azalia Berry MD, 60 mg at 08/17/23 0736  •  famotidine (PEPCID) tablet 10 mg, 10 mg, Oral, Daily, Azalia Berry MD, 10 mg at 08/17/23 8118  •  fish oil capsule 1,000 mg, 1,000 mg, Oral, BID, Azalia Berry MD, 1,000 mg at 08/17/23 1621  •  gabapentin (NEURONTIN) capsule 300 mg, 300 mg, Oral, HS, Azalia Berry MD, 300 mg at 08/17/23 2240  •  heparin (porcine) subcutaneous injection 5,000 Units, 5,000 Units, Subcutaneous, Q8H [facility], Azalia Berry MD, 5,000 Units at 08/18/23 0546  •  insulin detemir (LEVEMIR) subcutaneous injection 50 Units, 50 Units, Subcutaneous, QPM, Azalia Berry MD, 50 Units at 08/17/23 1621  •  insulin lispro (HumaLOG) 100 units/mL subcutaneous injection 16 Units, 16 Units, Subcutaneous, BID before breakfast/lunch, Pinky Espinosa MD, 16 Units at 08/17/23 1153  •  insulin lispro (HumaLOG) 100 units/mL subcutaneous injection 25 Units, 25 Units, Subcutaneous, Before Tiffanie Hurtado MD  •  labetalol (NORMODYNE) injection 10 mg, 10 mg, Intravenous, Q6H PRN, Susanne Hewitt MD  •  levothyroxine tablet 112 mcg, 112 mcg, Oral, QAM, Pinky Espinosa MD, 112 mcg at 08/18/23 0547  •  metoprolol tartrate (LOPRESSOR) tablet 50 mg, 50 mg, Oral, Q12H [facility], Pinky Espinosa MD, 50 mg at 08/17/23 2240  •  multi-electrolyte (PLASMALYTE-A/ISOLYTE-S PH 7.4) IV solution, 75 mL/hr, Intravenous, Continuous, Roge Dash MD, Last Rate: 75 mL/hr at 08/18/23 0108, 75 mL/hr at 08/18/23 0108  •  pantoprazole (PROTONIX) EC tablet 40 mg, 40 mg, Oral, BID AC, Pinky Espinosa MD, 40 mg at 08/17/23 1622  •  pramipexole (MIRAPEX) tablet 0.25 mg, 0.25 mg, Oral, HS, Pinky Espinosa MD, 0.25 mg at 08/17/23 2240  •  saccharomyces boulardii (FLORASTOR) capsule 250 mg, 250 mg, Oral, BID, Pinky Espinosa MD, 250 mg at 08/17/23 2239    Laboratory Results:  Results from last 7 days   Lab Units 08/18/23  0443 08/17/23  0544 08/16/23  1701 08/16/23  1611 08/16/23  0703   WBC Thousand/uL 7.72 7.71  --  9.67  --    HEMOGLOBIN g/dL 10.5* 11.5  --  11.1*  --    HEMATOCRIT % 32.6* 35.5  --  34.3*  --    PLATELETS Thousands/uL 266 283  --  269  --    POTASSIUM mmol/L 4.6 4.7 5.1  --  4.8   CHLORIDE mmol/L 105 104 97  --  102   CO2 mmol/L 22 24 21  --  21   BUN mg/dL 61* 74* 85*  --  84*   CREATININE mg/dL 2.60* 3.50* 4.21*  --  4.86*   CALCIUM mg/dL 8.7 9.3 8.9  --  9.7   MAGNESIUM mg/dL 2.2 2.1  --   --   --    PHOSPHORUS mg/dL 3.8 4.2*  --   --   --

## 2023-08-18 NOTE — PROGRESS NOTES
[facility]  Progress Note  Name: Ike Booker I  MRN: [id number]  Unit/Bed#: W -01 I Date of Admission: 8/16/2023   Date of Service: 8/18/2023 I Hospital Day: 2    Assessment/Plan   * ANDRA on CKD stage IV  Assessment & Plan  Lab Results   Component Value Date    CREATININE 2.60 (H) 08/18/2023    CREATININE 3.50 (H) 08/17/2023    CREATININE 4.21 (H) 08/16/2023       Lab Results   Component Value Date    EGFR 17 08/18/2023    EGFR 12 08/17/2023    EGFR 9 08/16/2023       • POA 4.21; (baseline baseline appears to be 1.8-2.3)  • UA:  Trace protein, 4-10 WBC  • UA microscopy 4-10 WBC  • History of neurogenic bladder and recurrent UTI  · Imaging:  CT without contrast shows 8/16/2023 1. No findings of acute abdominopelvic injury or acute abdominopelvic pathology. 2.  Nonobstructing left renal calculi measuring up to 0.6 cm without hydroureteronephrosis.   • Likely prerenal in the setting of poor p.o. intake/hydration as well  • Improving as of 8/18/23, Cr 2.6    Plan:  • Urinary retention protocol, Bladder scan, Daily weights, I/O  • Monitor off fluids per nephrology  • Monitor BMP daily and observe for downward trend of creatinine  • Avoid hypoperfusion of the kidneys, minimize nephrotoxins  RAAS Blockers/Diuretics held: Continue holding olmesartan, upon discharge resume torsemide 5 mg every other day     Acute cystitis without hematuria  Assessment & Plan  Appears to have recurrent UTIs  5/23 patient had Enterobacter cloacae-CRE, susceptible to cefepime, ciprofloxacin, gentamicin, levofloxacin, meropenem  6/23 grew lactobacillus cloacae no sensitivity studies noted  8/9 was noted to have Serratia marcescens- ESBL  Recently patient had Serratia marcescens, on cefdinir by urologist, started on 8/14  Patient has no improvement of symptoms  Symptoms likely related to urinary retention however patient remains encephalopathic so feel inclined to treat in the outpatient setting    Plan  · Cefpodoxime 100 mg daily once discharged, renal dosing    AMS (altered mental status)  Assessment & Plan  Assessment   · POA confused  · Likely in the setting of acute infection, toxic metabolic encephalopathy due to to ANDRA  Advanced age  · Remains encephalopathic    Plan  · Delirium precautions  -Initiate delirium precautions  -maintain normal sleep/wake cycle  -minimize overnight interruptions, group overnight vitals/labs/nursing checks as possible  -dim lights, close blinds and turn off tv to minimize stimulation and encourage sleep environment in evenings  -StartTylenol 975mg Q8H prn  -monitor for fecal and urinary retention which may precipitate delirium  -encourage early mobilization and ambulation  -provide frequent reorientation and redirection  -encourage family and friends at the bedside to help help calm patient if anxious  -avoid medications which may precipitate or worsen delirium such as tramadol, benzodiazepine, anticholinergics, and benadryl  -encourage hydration and nutrition   -redirect unwanted behaviors as first line      Urinary retention  Assessment & Plan  Assessment  · Appears to have recurrent UTI,  secondary to neurogenic bladder  · 5/23 patient had Enterobacter cloacae-CRE, susceptible to cefepime, ciprofloxacin, gentamicin, levofloxacin, meropenem  · 6/23 grew lactobacillus cloacae no sensitivity studies noted  · 8/9 was noted to have Serratia marcescens- ESBL  · Recently patient had Serratia marcescens, on cefdinir by urologist, started on 8/14  · Patient has continued urgency and hesitancy   · Urine culture positive for Serratia this admission  · Patient remains encephalopathic, will treat UTI once discharged    Plan  · Start cefpodoxime 100 mg once daily for 5 days upon discharge, renal dosing  · Continue straight catheterization  · Urinary retention protocol  · Urology consult on board, appreciate recs    Postural dizziness with presyncope  Assessment & Plan  Patient notes some lightheadedness  Patient given 1 L in the emergency department      Plan  · Continue to monitor  · Fall precautions in place      Anemia  Assessment & Plan  Recent Labs     08/16/23  1611 08/17/23  0544 08/18/23  0443   HGB 11.1* 11.5 10.5*   MCV 86 86 87   7/5, ferritin 7, iron sat 8, TIBC 426, iron 26 some component of DOMI  Some component of CKD  10.5 as of 8/18/2023, likely delusional      Plan:  • Monitor CBC every day  • Transfuse if Hb < 7  • Monitor for now      Hyponatremia  Assessment & Plan  Recent Labs     08/16/23  0703 08/16/23  1701 08/17/23  0544   SODIUM 133* 128* 135     No results found for: "OSMOUA", "Magda Journey", "OSMOLALITSER"    Workup:  · Hypovolemic/volume depletion  · Received 1 L sodium chloride bolus in the emergency department  · Sodium 135 as of 8/17/2023  · Resolved      Plan:   · Continue to monitor with daily BMP      Hypothyroid  Assessment & Plan  Assessment  · 8/16 recent TSH 0.89    Plan  · Continue levothyroxine 112 mcg daily    Essential hypertension  Assessment & Plan  Assessment  · Patient takes amlodipine 5 mg daily, olmesartan 40 mg daily, torsemide 5 mg daily, metoprolol 50 mg daily  · Patient hypertensive on 8/17/2023, 170/110, started labetalol with good response    Plan  · Continue to hold losartan in the setting of ANDRA  · On discharge, resume torsemide 5 mg every other day  · Continue metoprolol  · Continue amlodipine 5 mg twice daily, consider giving twice daily if systolic pressures remain above 150  · Labetalol as needed for systolic pressure greater than 180      Type 2 diabetes mellitus with diabetic chronic kidney disease [facility])  Assessment & Plan  Lab Results   Component Value Date    HGBA1C 6.8 (H) 03/07/2023       Recent Labs     08/16/23  1949/23  0722 08/17/23  1106   POCGLU 352* 186* 128       Blood Sugar Average: Last 72 hrs:  (P) 222Home medications insulin aspart 3 times daily before meals  16 units breakfast and lunch, 25 units at dinner  15 units detemir every evening  Diabetic diet  Sliding scale      Hypertriglyceridemia  Assessment & Plan  Assessment  · Triglycerides on admission, 476 - decreased from previous of 600    Plan  · Continue rosuvastatin 5 mg daily    Stage 4 chronic kidney disease [facility])  Assessment & Plan  Lab Results   Component Value Date    EGFR 12 08/17/2023    EGFR 9 08/16/2023    EGFR 8 08/16/2023    CREATININE 3.50 (H) 08/17/2023    CREATININE 4.21 (H) 08/16/2023    CREATININE 4.86 (H) 08/16/2023   Nephrology on board  Plan noted above under ANDRA on CKD             VTE Pharmacologic Prophylaxis: VTE Score: 4 Moderate Risk (Score 3-4) - Pharmacological DVT Prophylaxis Ordered: heparin. Patient Centered Rounds: I performed bedside rounds with nursing staff today. Discussions with Specialists or Other Care Team Provider: Nephrology, attending physician, Senior resident    Education and Discussions with Family / Patient: Updated  () at bedside. Total Time Spent on Date of Encounter in care of patient: 25 minutes This time was spent on one or more of the following: performing physical exam; counseling and coordination of care; obtaining or reviewing history; documenting in the medical record; reviewing/ordering tests, medications or procedures; communicating with other healthcare professionals and discussing with patient's family/caregivers. Current Length of Stay: 2 day(s)  Current Patient Status: Inpatient   Certification Statement: The patient will continue to require additional inpatient hospital stay due to ANDRA/ongoing encephalopathy  Discharge Plan: Anticipate discharge in 24-48 hrs to home with home services. Code Status: Level 3 - DNAR and DNI    Subjective:   Night team, patient had blood pressures in the 170s and labetalol was added as needed. Otherwise, no overnight events. Patient remains encephalopathic and pleasantly confused. She is wondering why she cannot think clearly. She endorses suprapubic tenderness. She denies any fevers, chills, constipation, diarrhea, hematuria, chest pain, or shortness of breath. Objective:     Vitals:   Temp (24hrs), Av °F (36.7 °C), Min:98 °F (36.7 °C), Max:98.1 °F (36.7 °C)    Temp:  [98 °F (36.7 °C)-98.1 °F (36.7 °C)] 98 °F (36.7 °C)  HR:  [60-71] 62  Resp:  [16-18] 16  BP: (119-135)/(69-75) 119/69  SpO2:  [93 %-94 %] 93 %  Body mass index is 32.38 kg/m². Input and Output Summary (last 24 hours): Intake/Output Summary (Last 24 hours) at 2023 1020  Last data filed at 2023 0127  Gross per 24 hour   Intake 5 ml   Output 1450 ml   Net -1445 ml       Physical Exam:   Physical Exam  Vitals and nursing note reviewed. Constitutional:       General: She is not in acute distress. Appearance: She is well-developed. She is obese. Comments: Patient appears dry   HENT:      Head: Normocephalic and atraumatic. Nose: Nose normal.      Mouth/Throat:      Mouth: Mucous membranes are moist.   Eyes:      Conjunctiva/sclera: Conjunctivae normal.   Cardiovascular:      Rate and Rhythm: Normal rate and regular rhythm. Heart sounds: No murmur heard. Pulmonary:      Effort: Pulmonary effort is normal. No respiratory distress. Breath sounds: Normal breath sounds. Abdominal:      Palpations: Abdomen is soft. Tenderness: There is no abdominal tenderness. Comments: Suprapubic tenderness to palpation   Musculoskeletal:      Cervical back: Neck supple. Right lower leg: No edema. Left lower leg: No edema. Skin:     General: Skin is warm and dry. Capillary Refill: Capillary refill takes less than 2 seconds. Neurological:      General: No focal deficit present. Mental Status: She is alert and oriented to person, place, and time.       Comments: Oriented x4   Psychiatric:         Mood and Affect: Mood normal.           Additional Data:     Labs:  Results from last 7 days   Lab Units 23  7058 08/17/23  0544   WBC Thousand/uL 7.72 7.71   HEMOGLOBIN g/dL 10.5* 11.5   HEMATOCRIT % 32.6* 35.5   PLATELETS Thousands/uL 266 283   NEUTROS PCT %  --  60   LYMPHS PCT %  --  28   MONOS PCT %  --  8   EOS PCT %  --  3     Results from last 7 days   Lab Units 08/18/23  0443 08/17/23  0544 08/16/23  1701   SODIUM mmol/L 136   < > 128*   POTASSIUM mmol/L 4.6   < > 5.1   CHLORIDE mmol/L 105   < > 97   CO2 mmol/L 22   < > 21   BUN mg/dL 61*   < > 85*   CREATININE mg/dL 2.60*   < > 4.21*   ANION GAP mmol/L 9   < > 10   CALCIUM mg/dL 8.7   < > 8.9   ALBUMIN g/dL  --   --  4.0   TOTAL BILIRUBIN mg/dL  --   --  0.28   ALK PHOS U/L  --   --  58   ALT U/L  --   --  14   AST U/L  --   --  17   GLUCOSE RANDOM mg/dL 135   < > 216*    < > = values in this interval not displayed. Results from last 7 days   Lab Units 08/16/23  1611   INR  0.87     Results from last 7 days   Lab Units 08/18/23  0736 08/17/23  2159 08/17/23  1559 08/17/23  1106 08/17/23  0722 08/16/23  1949   POC GLUCOSE mg/dl 184* 210* 81 128 186* 352*         Results from last 7 days   Lab Units 08/16/23  1611   LACTIC ACID mmol/L 1.1       Lines/Drains:  Invasive Devices     Peripheral Intravenous Line  Duration           Peripheral IV 08/16/23 Left Antecubital 1 day    Peripheral IV 08/17/23 Right;Ventral (anterior) Forearm <1 day                      Imaging: No pertinent imaging reviewed.     Recent Cultures (last 7 days):   Results from last 7 days   Lab Units 08/16/23  0703   URINE CULTURE  30,000-39,000 cfu/ml Serratia marcescens*       Last 24 Hours Medication List:   Current Facility-Administered Medications   Medication Dose Route Frequency Provider Last Rate   • acetaminophen  975 mg Oral Q8H PRN Ronnie Varela MD     • albuterol  2 puff Inhalation Q6H PRN Ronnie Varela MD     • amLODIPine  5 mg Oral Daily Rod Durbin MD     • atorvastatin  10 mg Oral Daily With Jodi Garsia MD     • calcium carbonate  1 tablet Oral TID With Meals James Spears MD     • co-enzyme Q-10  100 mg Oral Daily James Spears MD     • DULoxetine  60 mg Oral Daily James Spears MD     • famotidine  10 mg Oral Daily James Spears MD     • fish oil  1,000 mg Oral BID James Spears MD     • gabapentin  300 mg Oral HS James Spears MD     • heparin (porcine)  5,000 Units Subcutaneous [facility] James Spears MD     • insulin detemir  50 Units Subcutaneous QPM James Spears MD     • insulin lispro  16 Units Subcutaneous BID before breakfast/lunch James Spears MD     • insulin lispro  25 Units Subcutaneous Before Fernando Duff MD     • labetalol  10 mg Intravenous Q6H PRN Ashley Velázquez MD     • levothyroxine  112 mcg Oral QAM James Spears MD     • loperamide  2 mg Oral Q4H PRN Sari Saucedo DO     • metoprolol tartrate  50 mg Oral Q12H [facility] James Spears MD     • multi-electrolyte  75 mL/hr Intravenous Continuous Meaghan Verduzco MD 75 mL/hr (08/18/23 0108)   • pantoprazole  40 mg Oral BID AC James Spears MD     • pramipexole  0.25 mg Oral HS James Spears MD     • saccharomyces boulardii  250 mg Oral BID James Spears MD          Today, Patient Was Seen By: Sari Saucedo DO    **Please Note: This note may have been constructed using a voice recognition system. **

## 2023-08-18 NOTE — PROGRESS NOTES
Progress Note - Infectious Disease   Bi Davis 75 y.o. female MRN: [id number]  Unit/Bed#: W -45 Encounter: [id number]      Impression/Plan:  1. Urinary retention. Suspect the patient is not having adequate decompression of her bladder resulting in symptoms of pressure and pain over the bladder area. Not convinced that the patient has an invasive UTI. She remains without fever or leukocytosis and CT scan does not show any inflammatory changes of the bladder. Repeat urinalysis with only 4-10 white cells noted. Urine culture is growing the Serratia once again at a lower colony count  -Monitor off all antibiotics  -Monitor for amount of retention and adjust the frequency of intermittent catheterization as needed  -Urology follow-up  -If patient would develop symptoms more consistent with an invasive UTI despite adequate bladder decompression, could use cefpodoxime 400 mg daily x7 days     2. Acute kidney injury. Complicating chronic kidney disease stage IV. Possibly a prerenal issue. Possibly related to nonsteroidal use. Possibly related to hypertensive disease with progression  -Recheck BMP  -Volume management  -Close nephrology follow-up     3. Acute encephalopathy. Suspect related to metabolic issues. No other clear source appreciated. Doubt secondary to an invasive UTI. -Management of metabolic issues  -Monitor cognition  -Additional work-up as needed     4. Diabetes mellitus. Type II. With a reasonable hemoglobin A1c of 6.8. Certainly a risk factor for recurrent infection. Discussed the above management plan with the primary service    No active infectious disease issues. We will sign off. Please call for questions. Antibiotics:  None    Subjective:  Patient has no fever, chills, sweats; no nausea, vomiting, diarrhea; no cough, shortness of breath; no pain. No new symptoms.   She does continue to have some intermittent pressure in the bladder region    Objective:  Vitals:  Temp: [98 °F (36.7 °C)-98.1 °F (36.7 °C)] 98 °F (36.7 °C)  HR:  [60-71] 62  Resp:  [16-18] 16  BP: (119-135)/(69-75) 119/69  SpO2:  [93 %-94 %] 93 %  Temp (24hrs), Av °F (36.7 °C), Min:98 °F (36.7 °C), Max:98.1 °F (36.7 °C)  Current: Temperature: 98 °F (36.7 °C)    Physical Exam:   General Appearance:  Alert, interactive, nontoxic, no acute distress. Throat: Oropharynx moist without lesions. Lungs:   Clear to auscultation bilaterally; no wheezes, rhonchi or rales; respirations unlabored   Heart:  RRR; no murmur, rub or gallop   Abdomen:   Soft, non-tender, non-distended, positive bowel sounds. Extremities: No clubbing, cyanosis or edema   Skin: No new rashes or lesions. No draining wounds noted.        Labs, Imaging, & Other studies:   All pertinent labs and imaging studies were personally reviewed  Results from last 7 days   Lab Units 23  0443 23  0544 23  1611   WBC Thousand/uL 7.72 7.71 9.67   HEMOGLOBIN g/dL 10.5* 11.5 11.1*   PLATELETS Thousands/uL 266 283 269     Results from last 7 days   Lab Units 23  0443 23  0544 23  1701   SODIUM mmol/L 136 135 128*   POTASSIUM mmol/L 4.6 4.7 5.1   CHLORIDE mmol/L 105 104 97   CO2 mmol/L 22 24 21   BUN mg/dL 61* 74* 85*   CREATININE mg/dL 2.60* 3.50* 4.21*   EGFR ml/min/1.73sq m 17 12 9   CALCIUM mg/dL 8.7 9.3 8.9   AST U/L  --   --  17   ALT U/L  --   --  14   ALK PHOS U/L  --   --  58     Results from last 7 days   Lab Units 23  0703   URINE CULTURE  30,000-39,000 cfu/ml Serratia marcescens*

## 2023-08-18 NOTE — TELEPHONE ENCOUNTER
----- Message from Maki Buchanan MD sent at 8/18/2023  6:53 AM EDT -----  Hello    MA team-can the patient have an appointment with Dr. Thais Scruggs or advanced practitioner in the coming 2 to 4 weeks. BMP in 1 week for Dr. Thais Mcclellan was admitted with acute kidney injury. There was question of UTI but this did not seem to be the case based on discussions with infectious disease team.  ARB and torsemide were held and given intravenous fluids. Creatinine improving to 2.6. Anticipate discharge over the weekend.     Thank you

## 2023-08-19 VITALS
WEIGHT: 168.4 LBS | HEART RATE: 59 BPM | BODY MASS INDEX: 30.8 KG/M2 | DIASTOLIC BLOOD PRESSURE: 68 MMHG | SYSTOLIC BLOOD PRESSURE: 133 MMHG | RESPIRATION RATE: 16 BRPM | OXYGEN SATURATION: 95 % | TEMPERATURE: 97.2 F

## 2023-08-19 LAB
ANION GAP SERPL CALCULATED.3IONS-SCNC: 8 MMOL/L
BUN SERPL-MCNC: 53 MG/DL (ref 5–25)
CALCIUM SERPL-MCNC: 8.4 MG/DL (ref 8.4–10.2)
CHLORIDE SERPL-SCNC: 106 MMOL/L (ref 96–108)
CO2 SERPL-SCNC: 24 MMOL/L (ref 21–32)
CREAT SERPL-MCNC: 2.65 MG/DL (ref 0.6–1.3)
ERYTHROCYTE [DISTWIDTH] IN BLOOD BY AUTOMATED COUNT: 17 % (ref 11.6–15.1)
GFR SERPL CREATININE-BSD FRML MDRD: 17 ML/MIN/1.73SQ M
GLUCOSE SERPL-MCNC: 98 MG/DL (ref 65–140)
HCT VFR BLD AUTO: 33.2 % (ref 34.8–46.1)
HGB BLD-MCNC: 10.4 G/DL (ref 11.5–15.4)
MAGNESIUM SERPL-MCNC: 2.3 MG/DL (ref 1.9–2.7)
MCH RBC QN AUTO: 27.6 PG (ref 26.8–34.3)
MCHC RBC AUTO-ENTMCNC: 31.3 G/DL (ref 31.4–37.4)
MCV RBC AUTO: 88 FL (ref 82–98)
PHOSPHATE SERPL-MCNC: 3.7 MG/DL (ref 2.3–4.1)
PLATELET # BLD AUTO: 262 THOUSANDS/UL (ref 149–390)
PMV BLD AUTO: 11.9 FL (ref 8.9–12.7)
POTASSIUM SERPL-SCNC: 5.2 MMOL/L (ref 3.5–5.3)
RBC # BLD AUTO: 3.77 MILLION/UL (ref 3.81–5.12)
SODIUM SERPL-SCNC: 138 MMOL/L (ref 135–147)
WBC # BLD AUTO: 7.82 THOUSAND/UL (ref 4.31–10.16)

## 2023-08-19 PROCEDURE — 80048 BASIC METABOLIC PNL TOTAL CA: CPT | Performed by: INTERNAL MEDICINE

## 2023-08-19 PROCEDURE — 99232 SBSQ HOSP IP/OBS MODERATE 35: CPT | Performed by: INTERNAL MEDICINE

## 2023-08-19 PROCEDURE — 84100 ASSAY OF PHOSPHORUS: CPT | Performed by: INTERNAL MEDICINE

## 2023-08-19 PROCEDURE — 83735 ASSAY OF MAGNESIUM: CPT | Performed by: INTERNAL MEDICINE

## 2023-08-19 PROCEDURE — 85027 COMPLETE CBC AUTOMATED: CPT

## 2023-08-19 PROCEDURE — 99239 HOSP IP/OBS DSCHRG MGMT >30: CPT | Performed by: INTERNAL MEDICINE

## 2023-08-19 RX ORDER — CEFPODOXIME PROXETIL 100 MG/1
100 TABLET, FILM COATED ORAL DAILY
Qty: 5 TABLET | Refills: 0 | Status: SHIPPED | OUTPATIENT
Start: 2023-08-19 | End: 2023-08-24

## 2023-08-19 RX ORDER — TORSEMIDE 10 MG/1
5 TABLET ORAL EVERY OTHER DAY
Qty: 30 TABLET | Refills: 0 | Status: SHIPPED | OUTPATIENT
Start: 2023-08-19 | End: 2023-12-17

## 2023-08-19 RX ADMIN — AMLODIPINE BESYLATE 5 MG: 5 TABLET ORAL at 09:38

## 2023-08-19 RX ADMIN — Medication 250 MG: at 09:38

## 2023-08-19 RX ADMIN — Medication 100 MG: at 09:37

## 2023-08-19 RX ADMIN — FAMOTIDINE 10 MG: 20 TABLET, FILM COATED ORAL at 09:38

## 2023-08-19 RX ADMIN — LEVOTHYROXINE SODIUM 112 MCG: 112 TABLET ORAL at 06:51

## 2023-08-19 RX ADMIN — HEPARIN SODIUM 5000 UNITS: 5000 INJECTION INTRAVENOUS; SUBCUTANEOUS at 06:51

## 2023-08-19 RX ADMIN — INSULIN LISPRO 16 UNITS: 100 INJECTION, SOLUTION INTRAVENOUS; SUBCUTANEOUS at 13:10

## 2023-08-19 RX ADMIN — METOPROLOL TARTRATE 50 MG: 50 TABLET, FILM COATED ORAL at 09:38

## 2023-08-19 RX ADMIN — PANTOPRAZOLE SODIUM 40 MG: 40 TABLET, DELAYED RELEASE ORAL at 06:51

## 2023-08-19 RX ADMIN — Medication 1 TABLET: at 09:38

## 2023-08-19 RX ADMIN — OMEGA-3 FATTY ACIDS CAP 1000 MG 1000 MG: 1000 CAP at 09:37

## 2023-08-19 RX ADMIN — DULOXETINE HYDROCHLORIDE 60 MG: 60 CAPSULE, DELAYED RELEASE ORAL at 09:38

## 2023-08-19 RX ADMIN — Medication 1 TABLET: at 13:11

## 2023-08-19 RX ADMIN — INSULIN LISPRO 16 UNITS: 100 INJECTION, SOLUTION INTRAVENOUS; SUBCUTANEOUS at 09:37

## 2023-08-19 NOTE — ASSESSMENT & PLAN NOTE
Lab Results   Component Value Date    CREATININE 2.65 (H) 08/19/2023    CREATININE 2.60 (H) 08/18/2023    CREATININE 3.50 (H) 08/17/2023       Lab Results   Component Value Date    EGFR 17 08/19/2023    EGFR 17 08/18/2023    EGFR 12 08/17/2023       • POA 4.21; (baseline baseline appears to be 1.8-2.3)  • UA:  Trace protein, 4-10 WBC  • UA microscopy 4-10 WBC  • History of neurogenic bladder and recurrent UTI  · Imaging:  CT without contrast shows 8/16/2023 1. No findings of acute abdominopelvic injury or acute abdominopelvic pathology. 2.  Nonobstructing left renal calculi measuring up to 0.6 cm without hydroureteronephrosis.   · Stable at discharge per nephrology    Plan:  • Maintain adequate oral hydration, up to 64 ounces a day  • BMP in 1 week following discharge  • Stop taking olmesartan  · Start taking torsemide 5 mg every other day when weight exceeds 172 pounds

## 2023-08-19 NOTE — ASSESSMENT & PLAN NOTE
Assessment  · Patient at admission was taking amlodipine 5 mg daily, olmesartan 40 mg daily, torsemide 5 mg daily, metoprolol 50 mg daily  · Patient hypertensive on 8/17/2023, 170/110, started labetalol with good response  · Blood pressures stable at discharge    Plan  · Continue to hold omelosartan in the setting of ANDRA  · On discharge, resume torsemide 5 mg every other day when weight exceeds 130 pounds  · Continue metoprolol 50 mg  · Continue amlodipine 5 mg  · Outpatient follow-up with nephrology for further management

## 2023-08-19 NOTE — ASSESSMENT & PLAN NOTE
Assessment  · Triglycerides on admission, 476 - decreased from previous of 600    Plan  · Continue rosuvastatin 5 mg daily  · Continue fish oil

## 2023-08-19 NOTE — ASSESSMENT & PLAN NOTE
Recent Labs     08/17/23  0544 08/18/23  0443 08/19/23  0619   HGB 11.5 10.5* 10.4*   MCV 86 87 88   7/5, ferritin 7, iron sat 8, TIBC 426, iron 26 some component of DOMI  Likely secondary to CKD  Stable this admission      Plan:  • Continue to monitor patient

## 2023-08-19 NOTE — ASSESSMENT & PLAN NOTE
Lab Results   Component Value Date    EGFR 17 08/19/2023    EGFR 17 08/18/2023    EGFR 12 08/17/2023    CREATININE 2.65 (H) 08/19/2023    CREATININE 2.60 (H) 08/18/2023    CREATININE 3.50 (H) 08/17/2023   Nephrology on board  Plan noted above under ANDRA on CKD

## 2023-08-19 NOTE — ASSESSMENT & PLAN NOTE
Lab Results   Component Value Date    HGBA1C 6.8 (H) 03/07/2023       Recent Labs     08/18/23  1533 08/18/23  1750 08/18/23  2210 08/18/23  2231   POCGLU 160* 213* 58* 103       Plan  · Resume home insulin medication as outlined below  (P) 167.5Home medications insulin aspart 3 times daily before meals  16 units breakfast and lunch, 25 units at dinner  15 units detemir every evening  Diabetic diet

## 2023-08-19 NOTE — ASSESSMENT & PLAN NOTE
· Patient notes some lightheadedness  · Patient given 1 L in the emergency department  · Stable, no further episodes of dizziness this admission      Plan  · Continue to monitor at home  · Outpatient physical therapy follow-up

## 2023-08-19 NOTE — ASSESSMENT & PLAN NOTE
Recent Labs     08/17/23  0544 08/18/23  0443 08/19/23  0619   SODIUM 135 136 138     No results found for: "OSMOUA", "Trisha Midget", "OSMOLALITSER"    Workup:  · Received 1 L sodium chloride bolus in the emergency department  · Resolved at discharge      Plan:   · No further intervention

## 2023-08-19 NOTE — ASSESSMENT & PLAN NOTE
Assessment   · POA confused  · Likely in the setting of acute infection, toxic metabolic encephalopathy due to to ANDRA  Advanced age  · Return to baseline at discharge    Plan  · Monitor for AMS changes at home

## 2023-08-19 NOTE — PROGRESS NOTES
[facility] PROGRESS NOTE   Bi Davis 75 y.o. female MRN: [id number]  Unit/Bed#: W -01 Encounter: [id number]  Reason for Consult: ANDRA on CKD    ASSESSMENT and PLAN:  65-year-old female with history of CKD stage IV, neurogenic bladder, recurrent UTI, nephrolithiasis was admitted with abnormal labs. We are consulted for management of ANDRA on CKD. Acute kidney injury on chronic kidney disease. Baseline creatinine 1.5-2.0, progressively worsening in the last few months. Creatinine on admission was 4.86, creatinine today 2.65, slightly higher than yesterday 2.6. She is currently completely asymptomatic and would like to go home. Etiology most likely prerenal in setting of decreased oral intake +/- hemodynamic changes in setting of diuretic use and RAAS blockade with olmesartan. Continue to hold olmesartan. Continue to hold torsemide. Resume torsemide as outpatient 5 mg every other day if weight goes above 172 pounds. Her standing weight today is 168 pounds. Continue to encourage oral hydration at home. Okay to discharge from nephrology perspective. We will check BMP next week. Chronic kidney disease stage IV. Etiology thought to be due to diabetic nephropathy, hypertensive nephrosclerosis, chronic NSAID use. She follows with Dr. Barber Rodriguez MD as outpatient. Recurrent UTI. She has history of neurogenic bladder. She does intermittent straight cath at home. Urine cultures growing Serratia at a lower colony count. Per infectious disease, patient remains off antibiotics. Continue attempts at bladder decompression at home with intermittent straight cath. Urology has seen the patient and have recommended no surgical intervention. Hypertension/volume. Home medications include amlodipine 5 mg daily, olmesartan 40 mg daily, torsemide 5 mg daily, metoprolol 50 mg twice daily.   Spironolactone was recently discontinued as of 08/10/2023 in setting of rising creatinine and increasing potassium. Blood pressure is currently stable on amlodipine and metoprolol. Continue to hold olmesartan. Okay to resume torsemide 5 mg 3 times a week at discharge. Mild hyponatremia. This was due to volume depletion. This is currently resolved. Discussed with internal medicine team.  After discussion, we agreed that patient is stable for discharge off olmesartan and torsemide. We agreed to resume torsemide as outpatient 5 mg every other day if weight goes above 172 pounds. We agreed to repeat BMP next week. We will make arrangements for follow-up. SUBJECTIVE / 24H INTERVAL HISTORY:  Urine output recorded as 800 cc. Feels well. Denies dyspnea. Denies leg swelling. No confusion this morning. OBJECTIVE:  Current Weight: Weight - Scale: 76.4 kg (168 lb 6.4 oz)  Vitals:    08/18/23 0739 08/18/23 1550 08/18/23 2117 08/19/23 0600   BP: 119/69 135/73 133/68    BP Location:  Right arm     Pulse: 62 63 59    Resp: 16 16     Temp: 98 °F (36.7 °C) (!) 97.2 °F (36.2 °C)     TempSrc:  Oral     SpO2: 93% 99% 95%    Weight:    76.4 kg (168 lb 6.4 oz)       Intake/Output Summary (Last 24 hours) at 8/19/2023 4001  Last data filed at 8/18/2023 1751  Gross per 24 hour   Intake 240 ml   Output 800 ml   Net -560 ml     Physical Exam  Vitals and nursing note reviewed. Constitutional:       General: She is not in acute distress. Appearance: She is well-developed. HENT:      Head: Normocephalic and atraumatic. Eyes:      Conjunctiva/sclera: Conjunctivae normal.   Cardiovascular:      Rate and Rhythm: Normal rate and regular rhythm. Pulses: Normal pulses. Heart sounds: Normal heart sounds. No murmur heard. Pulmonary:      Effort: Pulmonary effort is normal. No respiratory distress. Breath sounds: Normal breath sounds. Abdominal:      Palpations: Abdomen is soft. Tenderness: There is no abdominal tenderness. Musculoskeletal:         General: No swelling.       Cervical back: Neck supple. Right lower leg: No edema. Left lower leg: No edema. Skin:     General: Skin is warm and dry. Capillary Refill: Capillary refill takes less than 2 seconds. Neurological:      Mental Status: She is alert.    Psychiatric:         Mood and Affect: Mood normal.       Medications:    Current Facility-Administered Medications:   •  acetaminophen (TYLENOL) tablet 975 mg, 975 mg, Oral, Q8H PRN, Balta Guerra MD, 975 mg at 08/18/23 0932  •  albuterol (PROVENTIL HFA,VENTOLIN HFA) inhaler 2 puff, 2 puff, Inhalation, Q6H PRN, Balta Guerra MD  •  amLODIPine (NORVASC) tablet 5 mg, 5 mg, Oral, Daily, Yanelis Treviño MD, 5 mg at 08/17/23 5088  •  atorvastatin (LIPITOR) tablet 10 mg, 10 mg, Oral, Daily With Daly Skelton MD, 10 mg at 08/18/23 1706  •  calcium carbonate (OYSTER SHELL,OSCAL) 500 mg tablet 1 tablet, 1 tablet, Oral, TID With Meals, Balta Guerra MD, 1 tablet at 08/18/23 1706  •  co-enzyme Q-10 capsule 100 mg, 100 mg, Oral, Daily, Balta Guerra MD, 100 mg at 08/18/23 5761  •  DULoxetine (CYMBALTA) delayed release capsule 60 mg, 60 mg, Oral, Daily, Balta Guerra MD, 60 mg at 08/18/23 0919  •  famotidine (PEPCID) tablet 10 mg, 10 mg, Oral, Daily, Balta Guerra MD, 10 mg at 08/18/23 0067  •  fish oil capsule 1,000 mg, 1,000 mg, Oral, BID, Balta Guerra MD, 1,000 mg at 08/18/23 1706  •  gabapentin (NEURONTIN) capsule 300 mg, 300 mg, Oral, HS, Balta Guerra MD, 300 mg at 08/18/23 2115  •  heparin (porcine) subcutaneous injection 5,000 Units, 5,000 Units, Subcutaneous, Q8H [address], Balta Guerra MD, 5,000 Units at 08/18/23 2115  •  insulin detemir (LEVEMIR) subcutaneous injection 50 Units, 50 Units, Subcutaneous, QPM, Balta Guerra MD, 50 Units at 08/18/23 1706  •  insulin lispro (HumaLOG) 100 units/mL subcutaneous injection 16 Units, 16 Units, Subcutaneous, BID before breakfast/lunch, Jennifer Olea MD, 16 Units at 08/18/23 0920  •  insulin lispro (HumaLOG) 100 units/mL subcutaneous injection 25 Units, 25 Units, Subcutaneous, Before Isabel Desouza MD, 25 Units at 08/18/23 1710  •  labetalol (NORMODYNE) injection 10 mg, 10 mg, Intravenous, Q6H PRN, Diane Whittington MD  •  levothyroxine tablet 112 mcg, 112 mcg, Oral, QAM, Jennifer Olea MD, 112 mcg at 08/18/23 0547  •  loperamide (IMODIUM) capsule 2 mg, 2 mg, Oral, Q4H PRN, Perley Cheadle, DO, 2 mg at 08/18/23 1706  •  metoprolol tartrate (LOPRESSOR) tablet 50 mg, 50 mg, Oral, Q12H [address], Jennifer Olea MD, 50 mg at 08/18/23 2118  •  pantoprazole (PROTONIX) EC tablet 40 mg, 40 mg, Oral, BID AC, Jennifer Olea MD, 40 mg at 08/18/23 1706  •  pramipexole (MIRAPEX) tablet 0.25 mg, 0.25 mg, Oral, HS, Jennifer Olea MD, 0.25 mg at 08/18/23 2115  •  saccharomyces boulardii (FLORASTOR) capsule 250 mg, 250 mg, Oral, BID, Jennifer Olea MD, 250 mg at 08/18/23 2115    Laboratory Results:  Results from last 7 days   Lab Units 08/18/23  0443 08/17/23  0544 08/16/23  1701 08/16/23  1611 08/16/23  0703   WBC Thousand/uL 7.72 7.71  --  9.67  --    HEMOGLOBIN g/dL 10.5* 11.5  --  11.1*  --    HEMATOCRIT % 32.6* 35.5  --  34.3*  --    PLATELETS Thousands/uL 266 283  --  269  --    POTASSIUM mmol/L 4.6 4.7 5.1  --  4.8   CHLORIDE mmol/L 105 104 97  --  102   CO2 mmol/L 22 24 21  --  21   BUN mg/dL 61* 74* 85*  --  84*   CREATININE mg/dL 2.60* 3.50* 4.21*  --  4.86*   CALCIUM mg/dL 8.7 9.3 8.9  --  9.7   MAGNESIUM mg/dL 2.2 2.1  --   --   --    PHOSPHORUS mg/dL 3.8 4.2*  --   --   --      Portions of the record may have been created with voice recognition software. Occasional wrong word or "sound a like" substitutions may have occurred due to the inherent limitations of voice recognition software. Read the chart carefully and recognize, using context, where substitutions have occurred.  If you have any questions, please contact the dictating provider.

## 2023-08-19 NOTE — ASSESSMENT & PLAN NOTE
Assessment  · 5/23 patient had Enterobacter cloacae-CRE, susceptible to cefepime, ciprofloxacin, gentamicin, levofloxacin, meropenem  · 6/23 grew lactobacillus cloacae no sensitivity studies noted  · 8/9 was noted to have Serratia marcescens- ESBL  · Recently patient had Serratia marcescens, on cefdinir by urologist, started on 8/14  · Urine culture positive for Serratia this admission    Plan  · Start cefpodoxime 100 mg once daily for 5 days renal dosing  · Continue straight catheterization at home, once daily  · Follow-up with urology outpatient

## 2023-08-19 NOTE — DISCHARGE SUMMARY
[address] [facility]  Discharge- Neil Greewnood 1950, 75 y.o. female MRN: [id number]  Unit/Bed#: W -99 Encounter: [id number]  Primary Care Provider: Dieter Parra MD   Date and time admitted to hospital: 8/16/2023  2:54 PM    * ANDRA on CKD stage IV  Assessment & Plan  Lab Results   Component Value Date    CREATININE 2.65 (H) 08/19/2023    CREATININE 2.60 (H) 08/18/2023    CREATININE 3.50 (H) 08/17/2023       Lab Results   Component Value Date    EGFR 17 08/19/2023    EGFR 17 08/18/2023    EGFR 12 08/17/2023       • POA 4.21; (baseline baseline appears to be 1.8-2.3)  • UA:  Trace protein, 4-10 WBC  • UA microscopy 4-10 WBC  • History of neurogenic bladder and recurrent UTI  · Imaging:  CT without contrast shows 8/16/2023 1. No findings of acute abdominopelvic injury or acute abdominopelvic pathology. 2.  Nonobstructing left renal calculi measuring up to 0.6 cm without hydroureteronephrosis.   · Stable at discharge per nephrology    Plan:  • Maintain adequate oral hydration, up to 64 ounces a day  • BMP in 1 week following discharge  • Stop taking olmesartan  · Start taking torsemide 5 mg every other day when weight exceeds 172 pounds    AMS (altered mental status)  Assessment & Plan  Assessment   · POA confused  · Likely in the setting of acute infection, toxic metabolic encephalopathy due to to ANDRA  Advanced age  · Return to baseline at discharge    Plan  · Monitor for AMS changes at home    Urinary retention  Assessment & Plan  Assessment  · 5/23 patient had Enterobacter cloacae-CRE, susceptible to cefepime, ciprofloxacin, gentamicin, levofloxacin, meropenem  · 6/23 grew lactobacillus cloacae no sensitivity studies noted  · 8/9 was noted to have Serratia marcescens- ESBL  · Recently patient had Serratia marcescens, on cefdinir by urologist, started on 8/14  · Urine culture positive for Serratia this admission    Plan  · Start cefpodoxime 100 mg once daily for 5 days renal dosing  · Continue straight catheterization at home, once daily  · Follow-up with urology outpatient    Postural dizziness with presyncope  Assessment & Plan  · Patient notes some lightheadedness  · Patient given 1 L in the emergency department  · Stable, no further episodes of dizziness this admission      Plan  · Continue to monitor at home  · Outpatient physical therapy follow-up      Anemia  Assessment & Plan  Recent Labs     08/17/23  0544 08/18/23  0443 08/19/23  0619   HGB 11.5 10.5* 10.4*   MCV 86 87 88   7/5, ferritin 7, iron sat 8, TIBC 426, iron 26 some component of DOMI  Likely secondary to CKD  Stable this admission      Plan:  • Continue to monitor patient      Hyponatremia  Assessment & Plan  Recent Labs     08/17/23  0544 08/18/23  0443 08/19/23  0619   SODIUM 135 136 138     No results found for: "OSMOUA", "Nataly Buys", "OSMOLALITSER"    Workup:  · Received 1 L sodium chloride bolus in the emergency department  · Resolved at discharge      Plan:   · No further intervention      Hypothyroid  Assessment & Plan  Assessment  · 8/16 recent TSH 0.89    Plan  · Continue levothyroxine 112 mcg daily    Essential hypertension  Assessment & Plan  Assessment  · Patient at admission was taking amlodipine 5 mg daily, olmesartan 40 mg daily, torsemide 5 mg daily, metoprolol 50 mg daily  · Patient hypertensive on 8/17/2023, 170/110, started labetalol with good response  · Blood pressures stable at discharge    Plan  · Continue to hold omelosartan in the setting of ANDRA  · On discharge, resume torsemide 5 mg every other day when weight exceeds 130 pounds  · Continue metoprolol 50 mg  · Continue amlodipine 5 mg  · Outpatient follow-up with nephrology for further management      Type 2 diabetes mellitus with diabetic chronic kidney disease [facility])  Assessment & Plan  Lab Results   Component Value Date    HGBA1C 6.8 (H) 03/07/2023       Recent Labs     08/18/23  1533 08/18/23  1750 08/18/23  2210 08/18/23  2231   POCGLU 160* 213* 58* 103       Plan  · Resume home insulin medication as outlined below  (P) 167.5Home medications insulin aspart 3 times daily before meals  16 units breakfast and lunch, 25 units at dinner  15 units detemir every evening  Diabetic diet      Hypertriglyceridemia  Assessment & Plan  Assessment  · Triglycerides on admission, 476 - decreased from previous of 600    Plan  · Continue rosuvastatin 5 mg daily  · Continue fish oil     Stage 4 chronic kidney disease [facility])  Assessment & Plan  Lab Results   Component Value Date    EGFR 17 08/19/2023    EGFR 17 08/18/2023    EGFR 12 08/17/2023    CREATININE 2.65 (H) 08/19/2023    CREATININE 2.60 (H) 08/18/2023    CREATININE 3.50 (H) 08/17/2023   Nephrology on board  Plan noted above under ANDRA on CKD      Medical Problems     Resolved Problems  Date Reviewed: 8/19/2023   None       Discharging Resident: Tripp Beasley DO  Discharging Attending: Lori Amanda MD  PCP: Lori Amanda MD  Admission Date:   Admission Orders (From admission, onward)     Ordered        08/16/23 1801  INPATIENT ADMISSION  Once                      Discharge Date: 08/19/23    Consultations During Hospital Stay:  · Nephrology, urology, infectious disease    Procedures Performed:   · Straight catheterization as needed for urinary retention    Significant Findings / Test Results:   · None    Incidental Findings:   · No incidental findings this admission    Test Results Pending at Discharge (will require follow up):  · No test results pending at discharge     Outpatient Tests Requested:  · BMP, ordered by nephrology    Complications: None    Reason for Admission: Recurrent UTI/ANDRA on CKD stage IV    Hospital Course:   Alysha De Oliveira is a 75 y.o. female patient who originally presented to the hospital on 8/16/2023 due to recurrent UTI secondary to neurogenic bladder and altered mental status. Patient has history of urinary retention, having to straight cath herself at home.   Of note, prior to this presentation, patient was found to have UTI with Serratia and was given cefdinir by urology without relief of symptoms. Patient had a creatinine of 4.7 (baseline 2) was told to go to the emergency room. In the ED, CT scan was completed without any abnormalities. Patient was admitted for ANDRA secondary to poor p.o. intake and UTI with Serratia. While admitted, patient was placed on IV fluids and her home torsemide and olmesartan were held in the setting of ANDRA. Nephrology was consulted and agreed with this plan. Patient was started on ertapenem for her presumed UTI. Additionally, delirium precautions were started due to altered mental status. Infectious disease was consulted and stopped the antibiotics, stating that rather than an UTI patient was experiencing sequelae of her known urinary retention. Urology was consulted and recommended increased straight catheterization frequency with PVR monitoring while admitted and at home. Patient was seen by PT and OT with recommendations to be discharged with ambulatory PT and OT referrals. Patient's ANDRA on CKD resolved with IV fluids. Adjustments to her home medications were made including torsemide 5 mg every other day when weight exceeds 172 pounds and olmesartan was held. However, as patient remained encephalopathic, the decision was made to start her on cefpodoxime upon discharge for 5-day course of antibiotics with follow-up outpatient on 8/21/2023. Patient will also follow-up outpatient with nephrology and plans to start physical therapy    Please see above list of diagnoses and related plan for additional information. Condition at Discharge: stable    Discharge Day Visit / Exam:   Subjective: Patient reports that she is doing well today and she feels ready to go home. She is requesting outpatient physical therapy follow-up. She understands that she is to follow-up with nephrology, urology, and her PCP following discharge.  She has no further questions about the plan. Vitals: Blood Pressure: 133/68 (08/18/23 2117)  Pulse: 59 (08/18/23 2117)  Temperature: (!) 97.2 °F (36.2 °C) (08/18/23 1550)  Temp Source: Oral (08/18/23 1550)  Respirations: 16 (08/18/23 1550)  Weight - Scale: 76.4 kg (168 lb 6.4 oz) (08/19/23 0600)  SpO2: 95 % (08/18/23 2117)  Exam:   Physical Exam  Vitals and nursing note reviewed. Constitutional:       General: She is not in acute distress. Appearance: She is well-developed. She is obese. HENT:      Head: Normocephalic and atraumatic. Nose: Nose normal.      Mouth/Throat:      Mouth: Mucous membranes are moist.   Eyes:      Conjunctiva/sclera: Conjunctivae normal.   Cardiovascular:      Rate and Rhythm: Normal rate and regular rhythm. Heart sounds: No murmur heard. Pulmonary:      Effort: Pulmonary effort is normal. No respiratory distress. Breath sounds: Normal breath sounds. Abdominal:      Palpations: Abdomen is soft. Tenderness: There is no abdominal tenderness. Comments: Suprapubic tenderness to palpation, consistent with urinary retention   Musculoskeletal:      Cervical back: Neck supple. Right lower leg: No edema. Left lower leg: No edema. Skin:     General: Skin is warm and dry. Capillary Refill: Capillary refill takes less than 2 seconds. Neurological:      General: No focal deficit present. Mental Status: She is alert and oriented to person, place, and time. Comments: Oriented x4   Psychiatric:         Mood and Affect: Mood normal.        Discussion with Family: Updated  () at bedside. Discharge instructions/Information to patient and family:   See after visit summary for information provided to patient and family. Provisions for Follow-Up Care:  See after visit summary for information related to follow-up care and any pertinent home health orders.        Disposition:   Other: Home with outpatient PT and OT follow-up    Planned Readmission: No    Discharge Medications:  See after visit summary for reconciled discharge medications provided to patient and/or family.       **Please Note: This note may have been constructed using a voice recognition system**

## 2023-08-21 ENCOUNTER — TRANSITIONAL CARE MANAGEMENT (OUTPATIENT)
Dept: INTERNAL MEDICINE CLINIC | Facility: CLINIC | Age: 73
End: 2023-08-21

## 2023-08-21 ENCOUNTER — PATIENT OUTREACH (OUTPATIENT)
Dept: CASE MANAGEMENT | Facility: OTHER | Age: 73
End: 2023-08-21

## 2023-08-21 ENCOUNTER — OFFICE VISIT (OUTPATIENT)
Dept: INTERNAL MEDICINE CLINIC | Facility: CLINIC | Age: 73
End: 2023-08-21
Payer: MEDICARE

## 2023-08-21 VITALS
BODY MASS INDEX: 30.23 KG/M2 | OXYGEN SATURATION: 97 % | SYSTOLIC BLOOD PRESSURE: 151 MMHG | WEIGHT: 170.6 LBS | DIASTOLIC BLOOD PRESSURE: 79 MMHG | HEART RATE: 75 BPM | HEIGHT: 63 IN | TEMPERATURE: 97.5 F

## 2023-08-21 DIAGNOSIS — Z71.89 COMPLEX CARE COORDINATION: Primary | ICD-10-CM

## 2023-08-21 DIAGNOSIS — R41.0 DISORIENTATION: ICD-10-CM

## 2023-08-21 DIAGNOSIS — I10 ESSENTIAL HYPERTENSION: ICD-10-CM

## 2023-08-21 DIAGNOSIS — M81.0 OSTEOPOROSIS WITHOUT CURRENT PATHOLOGICAL FRACTURE, UNSPECIFIED OSTEOPOROSIS TYPE: ICD-10-CM

## 2023-08-21 DIAGNOSIS — G47.33 OSA (OBSTRUCTIVE SLEEP APNEA): ICD-10-CM

## 2023-08-21 DIAGNOSIS — E87.1 HYPONATREMIA: ICD-10-CM

## 2023-08-21 DIAGNOSIS — J44.9 CHRONIC OBSTRUCTIVE PULMONARY DISEASE, UNSPECIFIED COPD TYPE (HCC): Chronic | ICD-10-CM

## 2023-08-21 DIAGNOSIS — R42 POSTURAL DIZZINESS WITH PRESYNCOPE: ICD-10-CM

## 2023-08-21 DIAGNOSIS — R33.9 URINARY RETENTION: ICD-10-CM

## 2023-08-21 DIAGNOSIS — E55.9 VITAMIN D DEFICIENCY: ICD-10-CM

## 2023-08-21 DIAGNOSIS — F33.9 DEPRESSION, RECURRENT (HCC): ICD-10-CM

## 2023-08-21 DIAGNOSIS — N18.4 ANEMIA DUE TO STAGE 4 CHRONIC KIDNEY DISEASE (HCC): ICD-10-CM

## 2023-08-21 DIAGNOSIS — E11.21 DIABETIC NEPHROPATHY ASSOCIATED WITH TYPE 2 DIABETES MELLITUS (HCC): ICD-10-CM

## 2023-08-21 DIAGNOSIS — E78.1 HYPERTRIGLYCERIDEMIA: ICD-10-CM

## 2023-08-21 DIAGNOSIS — R45.86 MOOD CHANGES: ICD-10-CM

## 2023-08-21 DIAGNOSIS — K59.09 CHRONIC CONSTIPATION: ICD-10-CM

## 2023-08-21 DIAGNOSIS — R55 POSTURAL DIZZINESS WITH PRESYNCOPE: ICD-10-CM

## 2023-08-21 DIAGNOSIS — D63.1 ANEMIA DUE TO STAGE 4 CHRONIC KIDNEY DISEASE (HCC): ICD-10-CM

## 2023-08-21 DIAGNOSIS — N30.00 ACUTE CYSTITIS WITHOUT HEMATURIA: ICD-10-CM

## 2023-08-21 DIAGNOSIS — E03.9 HYPOTHYROIDISM, UNSPECIFIED TYPE: ICD-10-CM

## 2023-08-21 DIAGNOSIS — N17.9 AKI (ACUTE KIDNEY INJURY) (HCC): Primary | ICD-10-CM

## 2023-08-21 PROBLEM — F03.918: Status: ACTIVE | Noted: 2023-08-21

## 2023-08-21 PROCEDURE — 99496 TRANSJ CARE MGMT HIGH F2F 7D: CPT

## 2023-08-21 RX ORDER — FERROUS SULFATE TAB EC 324 MG (65 MG FE EQUIVALENT) 324 (65 FE) MG
324 TABLET DELAYED RESPONSE ORAL EVERY OTHER DAY
Qty: 45 TABLET | Refills: 0 | Status: SHIPPED | OUTPATIENT
Start: 2023-08-21 | End: 2023-11-19

## 2023-08-21 RX ORDER — SENNOSIDES 8.6 MG
8.6 TABLET ORAL
Qty: 90 TABLET | Refills: 0 | Status: SHIPPED | OUTPATIENT
Start: 2023-08-21 | End: 2023-11-19

## 2023-08-21 NOTE — ASSESSMENT & PLAN NOTE
Pt recently admitted to [facility] with discharge on [id number]  Pt found to have mild anemia while inpatient thought to be 2/2 to CKD. Dt follows nephrology outpatient. PLAN:  Follow up with outpatient nephrologies. Pt has already made phone contact with nephrology outpatient.    Start PO Iron every other day  Start Senna nightly

## 2023-08-21 NOTE — ASSESSMENT & PLAN NOTE
Pt follows Dr. Champ Sheriff rheumatology and gets Prolia injections q 6 months  Last injection APR23    PLAN:  Defer to rheumatology.    Continue calcium

## 2023-08-21 NOTE — ASSESSMENT & PLAN NOTE
Pt not currently symptomatic  Has albuterol inhaler pt uses infrequently  Clear breath sounds on PE  Pt denies episodes of SOB    PLAN  Continue current plan.

## 2023-08-21 NOTE — ASSESSMENT & PLAN NOTE
Pt recently seen inpatient at [facility] discharged [id number]    PLAN Per urology SLRA  Continue bladder decompression w/ intermittent straight cath. Outpatient urology. No surgical intervention recommended at this time.

## 2023-08-21 NOTE — ASSESSMENT & PLAN NOTE
Lab Results   Component Value Date    [id number] 8.111 (H) 08/09/2023    FREET4 0.71 08/09/2023     Hypothymism could be attributing factor for patients decreased mood, increased fatigue, dementia symptoms. PLAN  Patient recently told to take levo 1 hr prior to meals which pt was not doing. This change was one week ago.      Continue current home treatment with new instructions and reevaluate in 6 weeks with repeat TSH reflex T4

## 2023-08-21 NOTE — ASSESSMENT & PLAN NOTE
Pt has elevated BP at office visit 151/79  Recent changes to Antihypertensives made inpatient [id number] with discharge [id number]    PLAN:  Pt is to see nephrology within 10 days  If patient mark see nephrology within 10 day pt should come back to office for hypertension management  -Continue to hold omelasartan   -Continue torsemide 5mg every other day when wt exceeds 172lbs  -Continue amlodipine 5mg PO daily  -Continue metoprolol 50mg PO daily   -Monitor BP daily or when symptomatic  -Continue new antihypertensive regimen  -If patient continues to be hypertensive in 10 days and can not see nephrology bring back to clinic and consider increase of amlodipine from 5 mg PO daily to 5mg PO BID. • Life style modifications  o Eat real food, not too much, mostly plants  o DASH Diet  o Do not drink your calories  o Eat a piece of fruit or vegetable 30 mins prior to meals  o Avoid sugar and sugar substitutes  o Limit meat products  o Per AHA guidelines, recommend moderate-vigorous intensity exercise for 30 minutes a day for 5 days a week or a total of 150 min/week  o Try to weigh yourself daily at same time of day and keep journal  o Keep food journal   o Sleep hygine  o Mindful meditation for 15mins a day 5 days a week. Insight timer a good free naida.

## 2023-08-21 NOTE — ASSESSMENT & PLAN NOTE
Lab Results   Component Value Date    HGBA1C 6.8 (H) 03/07/2023    Blood glucose: Dexecom well controlled

## 2023-08-21 NOTE — PROGRESS NOTES
Assessment & Plan     1. ANDRA on CKD stage IV  Assessment & Plan:  Chronic kidney disease stage IV. Pt follows Dr. Becky Stephens. Recently discharged from [facility] due to ANDRA, AMS, urinary retention    Pt to have BMP within one week of discharge. PLAN:   BMP Ordered to be completed in one week. Follow up with out patient nephrlogist.         2. Anemia due to stage 4 chronic kidney disease ([address])  Assessment & Plan:  Pt recently admitted to [facility] with discharge on 19AUG23  Pt found to have mild anemia while inpatient thought to be 2/2 to CKD. Dt follows nephrology outpatient. PLAN:  Follow up with outpatient nephrologies. Pt has already made phone contact with nephrology outpatient. Start PO Iron every other day  Start Senna nightly         Orders:  -     ferrous sulfate 324 (65 Fe) mg; Take 1 tablet (324 mg total) by mouth every other day  -     Iron Panel (Includes Ferritin, Iron Sat%, Iron, and TIBC); Future; Expected date: 11/21/2023  -     senna (SENOKOT) 8.6 mg; Take 1 tablet (8.6 mg total) by mouth daily at bedtime  -     CBC and differential; Future; Expected date: 11/21/2023    3. Urinary retention  Assessment & Plan:  Pt recently seen inpatient at [facility] discharged 19AUG23    PLAN Per urology SLRA  Continue bladder decompression w/ intermittent straight cath. Outpatient urology. No surgical intervention recommended at this time. 4. Acute cystitis without hematuria  Assessment & Plan:  Pt found to have UTI on admission to [facility] with discharge on 19AUG23. Pt prescribed 5 day course of Cefpodoxime 100mg PO daily renal dosing        5. Hyponatremia  Assessment & Plan:  Pt recently admitted to  [facility] with discharge date 19AUG23. Pt found to have hypo Na which was resolved prior to discharge. PLAN:  Repeat BMP within one week of discharge  Ordered BMP to be completed within one week.        6. Mood changes  Assessment & Plan:  6 month history of "grogginess"   Forgetfully for about 6 months according to   Symptoms worst over last 10 days  Depression worst than normal  Had facial trauma about 4 months ago and stopped using inspire for sleep apnea. Pt also has found to be anemic and has elevated TSH. DIFFERENTIALS INCLUDE: Depression, anemia, BARBARA, dementia, hypothyroidism  PLAN:  ANEMIA: Treat anemia as potential cause of fatigue. Fe supplementation every other day  Repeat CBC and Fe panel in 3 months  HYPOTHYROID: Pt recently told to take levo 1 hr prior to meals (1 wk ago). Continue current treatment with repeat TSH and T4 in 6 weeks. BARBARA: Pt has not be using inspira. Pt agreed to start using. Follow up with pt to see if this has helped symptoms of fatigue and memory. DEPRESSION: Continue to monitor for symptoms of depression at each visit. IF pt continues to have symptoms of fatigue dementia and depression  Continue work up with B12, folate, RPR   refer to senior care        7. Hypothyroidism, unspecified type  Assessment & Plan:  Lab Results   Component Value Date    [id number] 8.111 (H) 08/09/2023    FREET4 0.71 08/09/2023     Hypothymism could be attributing factor for patients decreased mood, increased fatigue, dementia symptoms. PLAN  Patient recently told to take levo 1 hr prior to meals which pt was not doing. This change was one week ago. Continue current home treatment with new instructions and reevaluate in 6 weeks with repeat TSH reflex T4    Orders:  -     TSH, 3rd generation with Free T4 reflex; Future; Expected date: 10/02/2023    8. Depression, recurrent ([address])  Assessment & Plan:  Pt has hx of depression and anxiety    PLAN:  If cognitive symptoms of depression, forgetfulness, and fatigue do not improve with treatment of thyroid, sleep apnea, anemia, consider referral to senior care.    Continue txt with duloxetine, levofloxacin,   • Life style modifications  o Per AHA guidelines, recommend moderate-vigorous intensity exercise for 30 minutes a day for 5 days a week or a total of 150 min/week  o Sleep hygine  o Mindful meditation for 15mins a day 5 days a week. Zelnas timer a good free naida.         9. BARBARA (obstructive sleep apnea)  Assessment & Plan:  Pt has not been using inspire since fall with facial trauma 4 months ago. PLAN:  Encourage continued use  Reevaluate at next visit. 10. Essential hypertension  Assessment & Plan:  Pt has elevated BP at office visit 151/79  Recent changes to Antihypertensives made inpatient [id number] with discharge [id number]    PLAN:  Pt is to see nephrology within 10 days  If patient cannont see nephrology within 10 day pt should come back to office for hypertension management  -Continue to hold omelasartan   -Continue torsemide 5mg every other day when wt exceeds 172lbs  -Continue amlodipine 5mg PO daily  -Continue metoprolol 50mg PO daily   -Monitor BP daily or when symptomatic  -Continue new antihypertensive regimen  -If patient continues to be hypertensive in 10 days and can not see nephrology bring back to clinic and consider increase of amlodipine from 5 mg PO daily to 5mg PO BID. • Life style modifications  o Eat real food, not too much, mostly plants  o DASH Diet  o Do not drink your calories  o Eat a piece of fruit or vegetable 30 mins prior to meals  o Avoid sugar and sugar substitutes  o Limit meat products  o Per AHA guidelines, recommend moderate-vigorous intensity exercise for 30 minutes a day for 5 days a week or a total of 150 min/week  o Try to weigh yourself daily at same time of day and keep journal  o Keep food journal   o Sleep hygine  o Mindful meditation for 15mins a day 5 days a week. Zelnas timer a good free naida. 11. Osteoporosis without current pathological fracture, unspecified osteoporosis type  Assessment & Plan:  Pt follows Dr. Cecil Seals rheumatology and gets Prolia injections q 6 months  Last injection APR23    PLAN:  Defer to rheumatology. Continue calcium       12.  Postural dizziness with presyncope  Assessment & Plan:  Pt recently discharges for SLRA who had episodic dizziness    PLAN:  Outpatient PT.       13. Chronic obstructive pulmonary disease, unspecified COPD type ([address] [facility])  Assessment & Plan:  Pt not currently symptomatic  Has albuterol inhaler pt uses infrequently  Clear breath sounds on PE  Pt denies episodes of SOB    PLAN  Continue current plan. 14. Hypertriglyceridemia  Assessment & Plan:  Lab Results   Component Value Date    CHOLESTEROL 131 08/16/2023    TRIG 476 (H) 08/16/2023    HDL 35 (L) 08/16/2023    [address] [facility]  08/16/2023      Comment:      Calculated LDL invalid, triglycerides >400 mg/dl     Pt lipid panel acceptable for LDH and Total cholestrol  Minimal evidence that treating hypertriglyceridemia improves mortality and morbidity. PALN  Continue statin at current dose. Consitue fish oil. • Life style modifications  o Eat real food, not too much, mostly plants  o DASH Diet  o Do not drink your calories  o Eat a piece of fruit or vegetable 30 mins prior to meals  o Avoid sugar and sugar substitutes  o Limit meat products  o Per AHA guidelines, recommend moderate-vigorous intensity exercise for 30 minutes a day for 5 days a week or a total of 150 min/week  o Try to weigh yourself daily at same time of day and keep journal  o Keep food journal   o Sleep hygine  o Mindful meditation for 15mins a day 5 days a week. Insight timer a good free naida. 15. Vitamin D deficiency  Assessment & Plan:  [id number] found to have Vit 23 low   Possibly 2/2 to CKD will defer to outpatient nephrology. 16. Diabetic nephropathy associated with type 2 diabetes mellitus ([address] [facility])  Assessment & Plan:    Lab Results   Component Value Date    HGBA1C 6.8 (H) 03/07/2023    Blood glucose: Dexecom well controlled        17. Chronic constipation  Assessment & Plan:  Pt recent dx of anemia  Pt started on Fe every other day     PLAN:  Senna HS      18.  Disorientation  Assessment & Plan:  Pt recently admitted to [facility] with discharge on [id number]  Pt found to have AMS most likely 2/2 to metobolic changes from ANDRA on CKD. PLAN:  Continue routine at home assessments of mental status. Subjective     Transitional Care Management Review:   Neil Greenwood is a 75 y.o. female here for TCM follow up. During the TCM phone call patient stated:  TCM Call     Date and time call was made  8/21/2023  8:47 AM    Hospital care reviewed  Records reviewed    Patient was hospitialized at  [facility]    Date of Admission  08/16/23    Date of discharge  08/19/23    Diagnosis  uti    Disposition  Home    Were the patients medications reviewed and updated  Yes    Current Symptoms  None    Dizziness severity  Mild      TCM Call     Post hospital issues  None    Should patient be enrolled in anticoag monitoring? No    Scheduled for follow up? Yes    Patients specialists  Nephrologist    Referrals needed  Faviola Spears MA     Did you obtain your prescribed medications  Yes    Do you need help managing your prescriptions or medications  No    Is transportation to your appointment needed  No    I have advised the patient to call PCP with any new or worsening symptoms  Natalia HECTOR LPN    Living Arrangements  Family members    Support System  Family    The type of support provided  Emotional; Financial; Physical    Do you have social support  Yes, as much as I need    Are you recieving any outpatient services  No    Are you recieving home care services  No    Are you using any community resources  No    Current waiver services  No    Have you fallen in the last 12 months  Yes    How many times  4    Interperter language line needed  No    Counseling  Family    Counseling topics  Activities of daily living        Pt was recently seen at [facility] with discharge [id number]. Pt here for follow up. Pt notes being fine since being discharged 2 days ago. Pt denies any new syptoms and feeling okay but feels a bit congested and tired. Pt notes fatigue every sit pt falls asleep for past 6 months worst past week and a half. Pt noted decreased food intake due to lack of hunger. Pt notes not recent change in weight. Pt does not follow a psychiatrist.    Pt has sleep Ramses Susan has inspire but not using for past year due to fall that broke nose. Pt  corrected pt to note it was 4 months ago. Pt  does not believe it has to do with increased sleepiness. Review of Systems   Constitutional: Positive for appetite change and fatigue. Negative for fever and unexpected weight change. HENT: Positive for congestion. Negative for sore throat. Respiratory: Negative. Cardiovascular: Negative. Gastrointestinal: Negative for diarrhea. Neurological: Negative. Psychiatric/Behavioral: Negative.         Objective     /79 (BP Location: Left arm, Patient Position: Sitting, Cuff Size: Large)   Pulse 75   Temp 97.5 °F (36.4 °C) (Tympanic)   Ht 5' 2.5" (1.588 m)   Wt 77.4 kg (170 lb 9.6 oz)   SpO2 97%   BMI 30.71 kg/m²      Physical Exam  Medications have been reviewed by provider in current encounter  Nutrition Assessment and Intervention:     New Nutrition Prescription completed with patient      Physical Activity Assessment and Intervention:    Activity journal completion recommended    Physical Activity Prescription completed with patient      Other interventions: PT referral ordered    Emotional and Mental Well-being, Sleep, Connectedness Assessment and Intervention:    Sleep/stress assessment performed    Mindfulness program recommended/explained      Tobacco and Toxic Substance Assessment and Intervention:     Tobacco use screening performed    Alcohol and drug use screening performed        Vivien Stallworth MD

## 2023-08-21 NOTE — ASSESSMENT & PLAN NOTE
Pt recently admitted to  [facility] with discharge date [id number]. Pt found to have hypo Na which was resolved prior to discharge. PLAN:  Repeat BMP within one week of discharge  Ordered BMP to be completed within one week.

## 2023-08-21 NOTE — ASSESSMENT & PLAN NOTE
Pt recently admitted to [facility] with discharge on [id number]  Pt found to have AMS most likely 2/2 to metobolic changes from ANDRA on CKD. PLAN:  Continue routine at home assessments of mental status.

## 2023-08-21 NOTE — ASSESSMENT & PLAN NOTE
Pt has not been using inspire since fall with facial trauma 4 months ago. PLAN:  Encourage continued use  Reevaluate at next visit.

## 2023-08-21 NOTE — PROGRESS NOTES
Outreach call to Jerry. She states she is doing well since discharge. She had her transition appointment with PCP today. BP was a little high and she was advised to monitor her BP until she sees her nephrologist.  She called for an appointment and there was no opening at this time, but she was advised that they were going to call her with an appointment as soon as possible. Her  transports her to her appointments. Meds not reviewed - she did not wish to go over them again as she had just reviewed them at her PCP appointment. I reminded her not to take any NSAIDS. I also reminded her that she should be taking her torsemide if her weight goes over 172 (per discharge information). I explained the [employer], but she does not wish to participate at this time. However, I encouraged her to call if she identifies any issues or concerns. I gave her contact information for Mendel Hoes, Care Manager.

## 2023-08-21 NOTE — ASSESSMENT & PLAN NOTE
Pt has hx of depression and anxiety    PLAN:  If cognitive symptoms of depression, forgetfulness, and fatigue do not improve with treatment of thyroid, sleep apnea, anemia, consider referral to senior care. Continue txt with duloxetine, levofloxacin,   • Life style modifications  o Per AHA guidelines, recommend moderate-vigorous intensity exercise for 30 minutes a day for 5 days a week or a total of 150 min/week  o Sleep hygine  o Mindful meditation for 15mins a day 5 days a week. Insight timer a good free naida.

## 2023-08-21 NOTE — ASSESSMENT & PLAN NOTE
Pt found to have UTI on admission to [facility] with discharge on 19AUG23.   Pt prescribed 5 day course of Cefpodoxime 100mg PO daily renal dosing

## 2023-08-21 NOTE — ASSESSMENT & PLAN NOTE
Chronic kidney disease stage IV. Pt follows Dr. Jaxon Disla. Recently discharged from [facility] due to ANDRA, AMS, urinary retention    Pt to have BMP within one week of discharge. PLAN:   BMP Ordered to be completed in one week.    Follow up with out patient nephrlogist.

## 2023-08-21 NOTE — ASSESSMENT & PLAN NOTE
1. Increase Sertraline to 150 mg, okay to take 1.5 tablets, update me in 1 month on how this dosing is going  2. Fasted labs ordered, schedule a lab appointment in the next 2 weeks, will get message with results and any follow-up needed      6 month history of "grogginess"   Forgetfully for about 6 months according to   Symptoms worst over last 10 days  Depression worst than normal  Had facial trauma about 4 months ago and stopped using inspire for sleep apnea. Pt also has found to be anemic and has elevated TSH. DIFFERENTIALS INCLUDE: Depression, anemia, BARBARA, dementia, hypothyroidism  PLAN:  ANEMIA: Treat anemia as potential cause of fatigue. Fe supplementation every other day  Repeat CBC and Fe panel in 3 months  HYPOTHYROID: Pt recently told to take levo 1 hr prior to meals (1 wk ago). Continue current treatment with repeat TSH and T4 in 6 weeks. BARBARA: Pt has not be using inspira. Pt agreed to start using. Follow up with pt to see if this has helped symptoms of fatigue and memory. DEPRESSION: Continue to monitor for symptoms of depression at each visit.     IF pt continues to have symptoms of fatigue dementia and depression  Continue work up with B12, folate, RPR   refer to senior care

## 2023-08-21 NOTE — ASSESSMENT & PLAN NOTE
Lab Results   Component Value Date    CHOLESTEROL 131 08/16/2023    TRIG 476 (H) 08/16/2023    HDL 35 (L) 08/16/2023    [address] [facility]  08/16/2023      Comment:      Calculated LDL invalid, triglycerides >400 mg/dl     Pt lipid panel acceptable for LDH and Total cholestrol  Minimal evidence that treating hypertriglyceridemia improves mortality and morbidity. PALN  Continue statin at current dose. Consitue fish oil. • Life style modifications  o Eat real food, not too much, mostly plants  o DASH Diet  o Do not drink your calories  o Eat a piece of fruit or vegetable 30 mins prior to meals  o Avoid sugar and sugar substitutes  o Limit meat products  o Per AHA guidelines, recommend moderate-vigorous intensity exercise for 30 minutes a day for 5 days a week or a total of 150 min/week  o Try to weigh yourself daily at same time of day and keep journal  o Keep food journal   o Sleep hygine  o Mindful meditation for 15mins a day 5 days a week. Insight timer a good free naida.

## 2023-08-21 NOTE — TELEPHONE ENCOUNTER
MD Thiago Hills MD; P Nephrology Claude Sheppard already sent a message for follow-up labs. I saw her on the day of discharge. Tatyda Mouse creatinine was down to 2.65 at time of discharge. Martín Niño remains on hold.  She was asked to resume torsemide 5 mg if her weight goes above 172 pounds.  Her standing weight at time of discharge was 168 pounds.  Let me know if you have any questions.  Thank you.

## 2023-08-23 LAB — GLUCOSE SERPL-MCNC: 70 MG/DL (ref 65–140)

## 2023-08-25 ENCOUNTER — LAB (OUTPATIENT)
Dept: LAB | Facility: AMBULARY SURGERY CENTER | Age: 73
End: 2023-08-25
Payer: MEDICARE

## 2023-08-25 ENCOUNTER — TELEPHONE (OUTPATIENT)
Dept: INTERNAL MEDICINE CLINIC | Facility: CLINIC | Age: 73
End: 2023-08-25

## 2023-08-25 ENCOUNTER — APPOINTMENT (OUTPATIENT)
Dept: LAB | Facility: AMBULARY SURGERY CENTER | Age: 73
End: 2023-08-25
Payer: MEDICARE

## 2023-08-25 DIAGNOSIS — G89.29 CHRONIC KNEE PAIN, UNSPECIFIED LATERALITY: Primary | ICD-10-CM

## 2023-08-25 DIAGNOSIS — M25.569 CHRONIC KNEE PAIN, UNSPECIFIED LATERALITY: Primary | ICD-10-CM

## 2023-08-25 DIAGNOSIS — N18.4 STAGE 4 CHRONIC KIDNEY DISEASE (HCC): ICD-10-CM

## 2023-08-25 LAB
ANION GAP SERPL CALCULATED.3IONS-SCNC: 6 MMOL/L
BUN SERPL-MCNC: 38 MG/DL (ref 5–25)
CALCIUM SERPL-MCNC: 9 MG/DL (ref 8.4–10.2)
CHLORIDE SERPL-SCNC: 106 MMOL/L (ref 96–108)
CO2 SERPL-SCNC: 25 MMOL/L (ref 21–32)
CREAT SERPL-MCNC: 2.04 MG/DL (ref 0.6–1.3)
GFR SERPL CREATININE-BSD FRML MDRD: 23 ML/MIN/1.73SQ M
GLUCOSE P FAST SERPL-MCNC: 160 MG/DL (ref 65–99)
POTASSIUM SERPL-SCNC: 5.1 MMOL/L (ref 3.5–5.3)
SODIUM SERPL-SCNC: 137 MMOL/L (ref 135–147)

## 2023-08-25 PROCEDURE — 80048 BASIC METABOLIC PNL TOTAL CA: CPT

## 2023-08-25 PROCEDURE — 36415 COLL VENOUS BLD VENIPUNCTURE: CPT

## 2023-08-25 NOTE — RESULT ENCOUNTER NOTE
Hello    Patient normally is followed up by Ms Cheri Pickens. Please let the patient know that the creatinine is improving to 2 mg/dL.   Potassium 5.1.  - Please advise patient to follow low potassium diet  - Please remind the patient that if her weight goes above 172 pounds to restart torsemide 5 mg every other day  -Please asked patient to repeat BMP in 2 weeks for Dr. Humphries Last  - Dr Juan Bey    Thank you    np

## 2023-08-30 ENCOUNTER — PROCEDURE VISIT (OUTPATIENT)
Dept: DERMATOLOGY | Facility: CLINIC | Age: 73
End: 2023-08-30
Payer: MEDICARE

## 2023-08-30 ENCOUNTER — TELEPHONE (OUTPATIENT)
Dept: NEPHROLOGY | Facility: CLINIC | Age: 73
End: 2023-08-30

## 2023-08-30 DIAGNOSIS — N18.4 STAGE 4 CHRONIC KIDNEY DISEASE (HCC): Primary | ICD-10-CM

## 2023-08-30 DIAGNOSIS — D09.9 SQUAMOUS CELL CARCINOMA IN SITU (SCCIS): Primary | ICD-10-CM

## 2023-08-30 PROCEDURE — 11602 EXC TR-EXT MAL+MARG 1.1-2 CM: CPT | Performed by: DERMATOLOGY

## 2023-08-30 PROCEDURE — 12032 INTMD RPR S/A/T/EXT 2.6-7.5: CPT | Performed by: DERMATOLOGY

## 2023-08-30 PROCEDURE — 88305 TISSUE EXAM BY PATHOLOGIST: CPT | Performed by: DERMATOLOGY

## 2023-08-30 NOTE — PROGRESS NOTES
PROCEDURE:  EXCISION WITH INTERMEDIATE LAYERED CLOSURE     Attending: Isabel Alas  Assistant: Braden Chavez     Pre-Op Diagnosis: SCCIS   Post-Op Diagnosis: Same   Benign versus Malignant Malignant       Lesion Anatomic Location: Left chest  (Previous Accession Number: [id number])  Pre-op size: 1 x 1 cm  Size of defect:  2 x 2 cm  (with 0.5 centimeter margins)  Final repaired wound length:  6.5 cm    Written and verbal, witnessed informed consent was obtained. I discussed that excision is a method of removing lesions both benign and malignant lesions. A portion of normal skin is often taken to ensure completeness of removal.  I reviewed that procedure will include numbing the area,  cutting around and under defect, undermining tissue, and closing the wound with sutures both inside and out. These sutures are usually removed in 7 to 14 days. Risks (bleeding, pain, infection, scarring, recurrence) and benefits discussed. It was discussed with patient that every effort is made to minimize scar, but scarring is influenced also by extrinsic factor such as location, age and genetics. Time Out: performed:  yes  Correct patient: yes. Correct site per Clinic Report: yes  Correct site per Patient Report: yes    LOCAL ANESTHESIA: 3:1 1% xylocaine with epi and 1-100,000 buffered    DESCRIPTION OF PROCEDURE: The patient was brought back into the procedure room, anesthetized locally, prepped and draped in the usual fashion. Using a #15 blade with a scalpel, the lesion was excised in elliptical fashion. The wound was  undermined in the  fascial plane. Hemostasis was achieved with light electrocoagulation. Purpose of undermining was to decrease wound tension and facilitate closure.     The wound was closed with subcutaneous sutures as follows:    Deep suture:4-0 Vicryl      Epidermal edge closure was accomplished with superficial sutures as follows:    Superficial suture: 4-0 Prolene  Superficial suture type: Running Subcuticular     Estimated blood loss less than 3ml. The patient tolerated the procedure well without any complications. The wound was cleaned with sterile saline, dried off, surgical vaseline ointment was applied, and the wound was covered. A pressure dressing was applied for stabilization and light pressure. The patient was given detailed oral and written instructions on postoperative care as detailed in consent. The patient left in good medical condition. POSTOP DISCUSSION DISCUSSION AND INSTRUCTIONS FOR PATIENT      Rationale for Procedure  A skin excision allows the dermatologist to remove a lesion. The procedure involves a local numbing medication and removing the entire lesion. Typically, the lesion is being removed because it is atypical, traumatized, or for significant appearance reasons. The area will be open like a brush burn and allowed to heal.   There will be no sutures. Tissue is sent to pathologist who will reconfirm diagnosis and verify completeness of lesion removal.    Description of Procedure  We would like to perform a skin excision today. A local anesthetic, similar to the kind that a dentist uses when filling a cavity, will be injected with a very small needle into the skin area to be sampled. The injected skin and tissue underneath should “go to sleep” and become numbed so that no further pain should be felt. A scalpel will be used to cut around the lesion and tissue will be submitted to pathology for examination. Depending on the diagnosis the lesion will be excised with a certain amount of normal skin to help assure completeness of lesion removal.  The physician will discuss in advance the anticipated size and extent of removal.   Bleeding will occur, but it will stopped with direct pressure, sutures, and electrocautery. Surgical “Vaseline-type” ointment will also applied after the procedure to help create a barrier between the wound and the outside world.       Risks and Potential Complications  The advantage of a skin excision is that it allows us to remove a problem lesion quickly. Although this usually permits the lesion to heal as soon as possible with the least scarring, there are some risks and potential complications that include but are not limited to the following:  - Some bleeding is normal at time of procedure and some bleeding on gauze is normal  the first few days after surgery. Profuse bleeding and bleeding with swelling and pain should be reported as detailed  below  - Infection is uncommon in skin surgery. Infection should be reported and is indicated by pain, redness, and discharge of purulent material.  - Some dull to at time sharp pain could occur initially the day after surgery. Persistent pain or increasing pain days after surgery is not expected and should be reported. - Every effort is made to minimize scar, but location, size, and genetics do play a role in scar appearance. A surgical wound does not achieve its optimal appearance until 6 months. There are several treatments available if scarring would be problematic including scar creams, silicone pad, laser and scar revision.  - Skin discoloration can occur especially in people of color. Its important to avoid sun on wound in first 6 months after surgery. Treatment is available if pigment is problematic.  - Incomplete removal of the lesion or recurrence of lesion can occur and this would then require further treatment and more surgery.  - Nerve Damage/Numbness/Loss of Function is very rare, but is most likely to occur if lesion is large or if it is in a high risk location  - Allergic Reaction to lidocaine is rare. More commonly,  epinephrine is used  with the lidocaine. Occasionally, epinephrine (aka adrenalin) may cause a brief  feeling of anxiety or jitteriness. - The person at the microscope  (pathologist) may provide additional information that was unexpected.  This unexpected finding could provoke the need for additional treatment or evaluation. What You Will Need to Do After the Procedure  1. Keep the area clean and dry the first day. Try NOT to remove the bandage for the first day. 2. Gently clean the area with soap and water and apply Vaseline ointment (this is over the counter and not a prescription) to the excision  site for up to 2 weeks. 3. Apply a clean appropriately sized bandage to area. Gauze and paper tape are recommended for sensitive skin. 4. Return for suture removal as instructed (generally 1 week for the face, 2 weeks for the body). 5. Take Acetaminophen (Tylenol) for discomfort, if no contraindications. Do NOT take Ibuprofen or aspirin unless specifically told to do so by your Dermatologist because these medications can make bleeding worse. 6. Call our office immediately for signs of infection: fever, chills, increased redness, warmth, tenderness, discomfort/pain, or pus or foul smell coming from the wound. If bleeding is noticed, place a clean cloth over the area and apply firm pressure for thirty minutes. Check the wound ONLY after 30 minutes of direct pressure; do not cheat and sneak a peak, as that does not count. If bleeding persists after 30 minutes of legitimate direct pressure, then try one more round of direct pressure for an additional 10 minutes to the area. Should the bleeding become heavier or not stop after the second attempt, call [facility] directly at [phone] (SKIN) or, if after hours, go to your local Emergency Room/Emergency Department. SCCIS excised with 0.5cm margins for 2cm excision and 6.5cm closure. Well tolerated. S/R in 2 weeks.   Scribe Attestation    I,:  Hortencia Joseph MA am acting as a scribe while in the presence of the attending physician.:       I,:  María Suarez MD personally performed the services described in this documentation    as scribed in my presence.:

## 2023-08-30 NOTE — TELEPHONE ENCOUNTER
----- Message from Beny Fried MD sent at 8/25/2023  3:37 PM EDT -----  Hello    Patient normally is followed up by Ms Brooklynn Last. Please let the patient know that the creatinine is improving to 2 mg/dL.   Potassium 5.1.  - Please advise patient to follow low potassium diet  - Please remind the patient that if her weight goes above 172 pounds to restart torsemide 5 mg every other day  -Please asked patient to repeat BMP in 2 weeks for Dr. Nikhil Gupta  - Dr Selvin Emmanuel    Thank you    np

## 2023-08-30 NOTE — PATIENT INSTRUCTIONS
Rationale for Procedure  A skin excision allows the dermatologist to remove a lesion. The procedure involves a local numbing medication and removing the entire lesion. Typically, the lesion is being removed because it is atypical, traumatized, or for significant appearance reasons. The area will be open like a brush burn and allowed to heal.   There will be no sutures. Tissue is sent to pathologist who will reconfirm diagnosis and verify completeness of lesion removal.    Description of Procedure  We would like to perform a skin excision today. A local anesthetic, similar to the kind that a dentist uses when filling a cavity, will be injected with a very small needle into the skin area to be sampled. The injected skin and tissue underneath should “go to sleep” and become numbed so that no further pain should be felt. A scalpel will be used to cut around the lesion and tissue will be submitted to pathology for examination. Depending on the diagnosis the lesion will be excised with a certain amount of normal skin to help assure completeness of lesion removal.  The physician will discuss in advance the anticipated size and extent of removal.   Bleeding will occur, but it will stopped with direct pressure, sutures, and electrocautery. Surgical “Vaseline-type” ointment will also applied after the procedure to help create a barrier between the wound and the outside world. Risks and Potential Complications  The advantage of a skin excision is that it allows us to remove a problem lesion quickly. Although this usually permits the lesion to heal as soon as possible with the least scarring, there are some risks and potential complications that include but are not limited to the following:  Some bleeding is normal at time of procedure and some bleeding on gauze is normal  the first few days after surgery.   Profuse bleeding and bleeding with swelling and pain should be reported as detailed  below  Infection is uncommon in skin surgery. Infection should be reported and is indicated by pain, redness, and discharge of purulent material.  Some dull to at time sharp pain could occur initially the day after surgery. Persistent pain or increasing pain days after surgery is not expected and should be reported. Every effort is made to minimize scar, but location, size, and genetics do play a role in scar appearance. A surgical wound does not achieve its optimal appearance until 6 months. There are several treatments available if scarring would be problematic including scar creams, silicone pad, laser and scar revision. Skin discoloration can occur especially in people of color. Its important to avoid sun on wound in first 6 months after surgery. Treatment is available if pigment is problematic. Incomplete removal of the lesion or recurrence of lesion can occur and this would then require further treatment and more surgery. Nerve Damage/Numbness/Loss of Function is very rare, but is most likely to occur if lesion is large or if it is in a high risk location  Allergic Reaction to lidocaine is rare. More commonly,  epinephrine is used  with the lidocaine. Occasionally, epinephrine (aka adrenalin) may cause a brief  feeling of anxiety or jitteriness. The person at the microscope  (pathologist) may provide additional information that was unexpected. This unexpected finding could provoke the need for additional treatment or evaluation. What You Will Need to Do After the Procedure  Keep the area clean and dry the first day. Try NOT to remove the bandage for the first day. Gently clean the area with soap and water and apply Vaseline ointment (this is over the counter and not a prescription) to the excision  site for up to 2 weeks. Apply a clean appropriately sized bandage to area. Gauze and paper tape are recommended for sensitive skin.   Return for suture removal as instructed (generally 1 week for the face, 2 weeks for the body). Take Acetaminophen (Tylenol) for discomfort, if no contraindications. Do NOT take Ibuprofen or aspirin unless specifically told to do so by your Dermatologist because these medications can make bleeding worse. Call our office immediately for signs of infection: fever, chills, increased redness, warmth, tenderness, discomfort/pain, or pus or foul smell coming from the wound. If bleeding is noticed, place a clean cloth over the area and apply firm pressure for thirty minutes. Check the wound ONLY after 30 minutes of direct pressure; do not cheat and sneak a peak, as that does not count. If bleeding persists after 30 minutes of legitimate direct pressure, then try one more round of direct pressure for an additional 10 minutes to the area. Should the bleeding become heavier or not stop after the second attempt, call [facility] directly at [phone] (SKIN) or, if after hours, go to your local Emergency Room/Emergency Department.

## 2023-09-06 ENCOUNTER — HOSPITAL ENCOUNTER (EMERGENCY)
Facility: HOSPITAL | Age: 73
Discharge: HOME | DRG: 683 | End: 2023-09-06
Attending: EMERGENCY MEDICINE
Payer: MEDICARE

## 2023-09-06 ENCOUNTER — APPOINTMENT (EMERGENCY)
Dept: CT IMAGING | Facility: HOSPITAL | Age: 73
DRG: 683 | End: 2023-09-06
Payer: MEDICARE

## 2023-09-06 VITALS
OXYGEN SATURATION: 97 % | TEMPERATURE: 98 F | HEART RATE: 66 BPM | SYSTOLIC BLOOD PRESSURE: 154 MMHG | RESPIRATION RATE: 18 BRPM | WEIGHT: 161.16 LBS | DIASTOLIC BLOOD PRESSURE: 78 MMHG | BODY MASS INDEX: 29.01 KG/M2

## 2023-09-06 DIAGNOSIS — R41.0 CONFUSION: ICD-10-CM

## 2023-09-06 DIAGNOSIS — R79.89 ELEVATED TSH: ICD-10-CM

## 2023-09-06 DIAGNOSIS — N39.0 UTI (URINARY TRACT INFECTION): Primary | ICD-10-CM

## 2023-09-06 DIAGNOSIS — R41.0 DELIRIUM: ICD-10-CM

## 2023-09-06 LAB
ALBUMIN SERPL BCP-MCNC: 4.2 G/DL (ref 3.5–5)
ALP SERPL-CCNC: 73 U/L (ref 34–104)
ALT SERPL W P-5'-P-CCNC: 26 U/L (ref 7–52)
AMMONIA PLAS-SCNC: 37 UMOL/L (ref 18–72)
ANION GAP SERPL CALCULATED.3IONS-SCNC: 10 MMOL/L
APAP SERPL-MCNC: <10 UG/ML (ref 10–20)
AST SERPL W P-5'-P-CCNC: 25 U/L (ref 13–39)
ATRIAL RATE: 192 BPM
ATRIAL RATE: 62 BPM
BACTERIA UR QL AUTO: ABNORMAL /HPF
BASOPHILS # BLD AUTO: 0.03 THOUSANDS/ÂΜL (ref 0–0.1)
BASOPHILS NFR BLD AUTO: 0 % (ref 0–1)
BILIRUB SERPL-MCNC: 0.38 MG/DL (ref 0.2–1)
BILIRUB UR QL STRIP: NEGATIVE
BUN SERPL-MCNC: 24 MG/DL (ref 5–25)
CALCIUM SERPL-MCNC: 9.5 MG/DL (ref 8.4–10.2)
CHLORIDE SERPL-SCNC: 100 MMOL/L (ref 96–108)
CLARITY UR: CLEAR
CO2 SERPL-SCNC: 27 MMOL/L (ref 21–32)
COLOR UR: ABNORMAL
CREAT SERPL-MCNC: 2.07 MG/DL (ref 0.6–1.3)
EOSINOPHIL # BLD AUTO: 0.26 THOUSAND/ÂΜL (ref 0–0.61)
EOSINOPHIL NFR BLD AUTO: 3 % (ref 0–6)
ERYTHROCYTE [DISTWIDTH] IN BLOOD BY AUTOMATED COUNT: 16.7 % (ref 11.6–15.1)
ETHANOL SERPL-MCNC: <10 MG/DL
GFR SERPL CREATININE-BSD FRML MDRD: 23 ML/MIN/1.73SQ M
GLUCOSE SERPL-MCNC: 140 MG/DL (ref 65–140)
GLUCOSE SERPL-MCNC: 216 MG/DL (ref 65–140)
GLUCOSE SERPL-MCNC: 69 MG/DL (ref 65–140)
GLUCOSE UR STRIP-MCNC: ABNORMAL MG/DL
HCT VFR BLD AUTO: 38.3 % (ref 34.8–46.1)
HGB BLD-MCNC: 12.2 G/DL (ref 11.5–15.4)
HGB UR QL STRIP.AUTO: NEGATIVE
IMM GRANULOCYTES # BLD AUTO: 0.04 THOUSAND/UL (ref 0–0.2)
IMM GRANULOCYTES NFR BLD AUTO: 1 % (ref 0–2)
KETONES UR STRIP-MCNC: NEGATIVE MG/DL
LEUKOCYTE ESTERASE UR QL STRIP: ABNORMAL
LYMPHOCYTES # BLD AUTO: 2.04 THOUSANDS/ÂΜL (ref 0.6–4.47)
LYMPHOCYTES NFR BLD AUTO: 24 % (ref 14–44)
MCH RBC QN AUTO: 28 PG (ref 26.8–34.3)
MCHC RBC AUTO-ENTMCNC: 31.9 G/DL (ref 31.4–37.4)
MCV RBC AUTO: 88 FL (ref 82–98)
MONOCYTES # BLD AUTO: 0.43 THOUSAND/ÂΜL (ref 0.17–1.22)
MONOCYTES NFR BLD AUTO: 5 % (ref 4–12)
NEUTROPHILS # BLD AUTO: 5.87 THOUSANDS/ÂΜL (ref 1.85–7.62)
NEUTS SEG NFR BLD AUTO: 67 % (ref 43–75)
NITRITE UR QL STRIP: NEGATIVE
NON-SQ EPI CELLS URNS QL MICRO: ABNORMAL /HPF
NRBC BLD AUTO-RTO: 0 /100 WBCS
P AXIS: 11 DEGREES
P AXIS: 35 DEGREES
PH UR STRIP.AUTO: 6.5 [PH]
PLATELET # BLD AUTO: 247 THOUSANDS/UL (ref 149–390)
PMV BLD AUTO: 10.8 FL (ref 8.9–12.7)
POTASSIUM SERPL-SCNC: 4 MMOL/L (ref 3.5–5.3)
PR INTERVAL: 218 MS
PR INTERVAL: 224 MS
PROT SERPL-MCNC: 6.6 G/DL (ref 6.4–8.4)
PROT UR STRIP-MCNC: ABNORMAL MG/DL
QRS AXIS: -6 DEGREES
QRS AXIS: -8 DEGREES
QRSD INTERVAL: 84 MS
QRSD INTERVAL: 86 MS
QT INTERVAL: 358 MS
QT INTERVAL: 366 MS
QTC INTERVAL: 363 MS
QTC INTERVAL: 374 MS
RBC # BLD AUTO: 4.36 MILLION/UL (ref 3.81–5.12)
RBC #/AREA URNS AUTO: ABNORMAL /HPF
SALICYLATES SERPL-MCNC: <5 MG/DL (ref 3–20)
SODIUM SERPL-SCNC: 137 MMOL/L (ref 135–147)
SP GR UR STRIP.AUTO: 1.01 (ref 1–1.03)
T WAVE AXIS: -64 DEGREES
T WAVE AXIS: 215 DEGREES
T4 FREE SERPL-MCNC: 0.64 NG/DL (ref 0.61–1.12)
TSH SERPL DL<=0.05 MIU/L-ACNC: 15.03 UIU/ML (ref 0.45–4.5)
UROBILINOGEN UR STRIP-ACNC: <2 MG/DL
VENTRICULAR RATE: 62 BPM
VENTRICULAR RATE: 63 BPM
WBC # BLD AUTO: 8.67 THOUSAND/UL (ref 4.31–10.16)
WBC #/AREA URNS AUTO: ABNORMAL /HPF

## 2023-09-06 PROCEDURE — 93005 ELECTROCARDIOGRAM TRACING: CPT

## 2023-09-06 PROCEDURE — 70450 CT HEAD/BRAIN W/O DYE: CPT

## 2023-09-06 PROCEDURE — 82077 ASSAY SPEC XCP UR&BREATH IA: CPT

## 2023-09-06 PROCEDURE — 84443 ASSAY THYROID STIM HORMONE: CPT

## 2023-09-06 PROCEDURE — 87186 SC STD MICRODIL/AGAR DIL: CPT

## 2023-09-06 PROCEDURE — 82948 REAGENT STRIP/BLOOD GLUCOSE: CPT

## 2023-09-06 PROCEDURE — G1004 CDSM NDSC: HCPCS

## 2023-09-06 PROCEDURE — 99285 EMERGENCY DEPT VISIT HI MDM: CPT | Performed by: EMERGENCY MEDICINE

## 2023-09-06 PROCEDURE — 85025 COMPLETE CBC W/AUTO DIFF WBC: CPT

## 2023-09-06 PROCEDURE — 87077 CULTURE AEROBIC IDENTIFY: CPT

## 2023-09-06 PROCEDURE — 82140 ASSAY OF AMMONIA: CPT

## 2023-09-06 PROCEDURE — 36415 COLL VENOUS BLD VENIPUNCTURE: CPT

## 2023-09-06 PROCEDURE — 80143 DRUG ASSAY ACETAMINOPHEN: CPT

## 2023-09-06 PROCEDURE — 87086 URINE CULTURE/COLONY COUNT: CPT

## 2023-09-06 PROCEDURE — 84439 ASSAY OF FREE THYROXINE: CPT

## 2023-09-06 PROCEDURE — 96365 THER/PROPH/DIAG IV INF INIT: CPT

## 2023-09-06 PROCEDURE — 99285 EMERGENCY DEPT VISIT HI MDM: CPT

## 2023-09-06 PROCEDURE — 93010 ELECTROCARDIOGRAM REPORT: CPT | Performed by: INTERNAL MEDICINE

## 2023-09-06 PROCEDURE — 80053 COMPREHEN METABOLIC PANEL: CPT

## 2023-09-06 PROCEDURE — 81001 URINALYSIS AUTO W/SCOPE: CPT

## 2023-09-06 PROCEDURE — 88305 TISSUE EXAM BY PATHOLOGIST: CPT | Performed by: DERMATOLOGY

## 2023-09-06 PROCEDURE — 80179 DRUG ASSAY SALICYLATE: CPT

## 2023-09-06 RX ORDER — CEFPODOXIME PROXETIL 100 MG/1
100 TABLET, FILM COATED ORAL 2 TIMES DAILY
Qty: 14 TABLET | Refills: 0 | Status: ON HOLD | OUTPATIENT
Start: 2023-09-06 | End: 2023-09-12

## 2023-09-06 RX ADMIN — CEFTRIAXONE SODIUM 1000 MG: 10 INJECTION, POWDER, FOR SOLUTION INTRAVENOUS at 11:19

## 2023-09-06 NOTE — ED ATTENDING ATTESTATION
9/6/2023  I, Trenton Valentino MD, saw and evaluated the patient. I have discussed the patient with the resident/non-physician practitioner and agree with the resident's/non-physician practitioner's findings, Plan of Care, and MDM as documented in the resident's/non-physician practitioner's note, except where noted. All available labs and Radiology studies were reviewed. I was present for key portions of any procedure(s) performed by the resident/non-physician practitioner and I was immediately available to provide assistance. At this point I agree with the current assessment done in the Emergency Department. I have conducted an independent evaluation of this patient a history and physical is as follows:    74 yo female with h/o hypothyroidism, recurrent UTI, IDDM, neurogenic bladder self cath p/w AMS. Reports increased fatigue, sleeping more than usual, increased confusion. No falls. Yesterday forgetting how to play card games that are familiar to her,  Wearing multiple sets of underwear at once. Med changes notable for increased dose of levothyroxine and iron supplemental.  No URI symptoms, cough, chest pain, unilateral weakness, rash, n/v/d, change in appetite or PO intake.             ED Course         Critical Care Time  Procedures Wed Sep 06, 2023   0800 CT head without contrast  IMPRESSION:     No acute intracranial abnormality. Stable chronic microangiopathic changes within the brain.                 Workstation performed: [id number]   0805 TSH 3RD GENERATON(!): 15.028  elevated   0820 CT head without contrast     IMPRESSION:     No acute intracranial abnormality.   Stable chronic microangiopathic changes within the brain.                 Workstation performed: [id number]   4877 UA w Reflex to Microscopic w Reflex to Culture(!)  Small LE, negative nitrites will await micro   1035 Urine Microscopic(!)  Consistent with UTI   1320 POC Glucose: 140  wnl         Critical Care Time  Procedures

## 2023-09-06 NOTE — DISCHARGE INSTRUCTIONS
Your work-up in the emergency department indicated UTI. You have been given a prescription for cefpodoxime with instructions to take 1 tablet by mouth twice a day for 7 days. You have also been instructed to follow-up with your PCP in regards to continuing UTI as well as elevated TSH levels. You may take Tylenol every 6-8 hours as needed for pain or fever. Should you develop pain or fever uncontrolled with medication, chest pain, shortness of breath, lightheadedness, syncope or any other symptoms that you find concerning please return to the emergency department immediately.

## 2023-09-06 NOTE — ED PROVIDER NOTES
History  Chief Complaint   Patient presents with   • Altered Mental Status     Per pts , pt having medication changes the past 3 weeks and seems to be more confused than normal. Pt has hx of UTI and caths once daily. Denies urinary symptoms. Pt playing card game last night and forgot how to play and wasn't comprehending anything per . Pt also wearing 2 bras and 2 pairs of underwear last night and unsure why. Today pts shirt on backwards. The patient is a 71-year-old female with a past medical history of COPD, CKD stage IV, type 2 diabetes, anemia, cancer and a recent hospital admission for ANDRA who presents to the emergency department with complaint of altered mental status. Her  reports that over the past couple of weeks she has had increasing confusion that worsened this throughout the day and down into the evening. He reports that 3 weeks ago her Synthroid was increased and she was started on iron supplement secondary to anemia. He also reports that she has had chronic UTIs and performs daily catheterization. He states that she has not been treated for UTI in approximately a month. He also informs me that last night they were playing a card game with friends when she forgot how to play. She stated that she could not play and got up to go to her bedroom where she proceeded to put on 2 pairs of bras and 2 pairs of panties with no explanation as to why. He also informs me that this morning when she got dressed she put her shirt on backwards. She denies change in vision, lightheadedness, headache, nausea, vomiting, diarrhea, chest pain, shortness of breath, hematuria, dysuria, numbness, tingling, rash or fever. Prior to Admission Medications   Prescriptions Last Dose Informant Patient Reported? Taking?    B-D ULTRAFINE III SHORT PEN 31G X 8 MM MISC  Self Yes No   Calcium Citrate 1040 MG TABS  Self Yes No   Sig: Take 500 mg by mouth Three times a day   Coenzyme Q10 (CoQ10) 100 MG CAPS  Self Yes No   Sig: Take 100 mg by mouth in the morning   Cranberry 200 MG CAPS  Self Yes No   Sig: Take 200 capsules by mouth 3 (three) times a day   DULoxetine (CYMBALTA) 30 mg delayed release capsule  Self Yes No   Sig: Take 60 mg by mouth daily   Icosapent Ethyl 1 g CAPS  Self Yes No   Sig: Take 1 capsule by mouth 2 (two) times a day   Probiotic Product (PROBIOTIC PO)  Self Yes No   Sig: Take 1 capsule by mouth 2 (two) times a day   acetaminophen (TYLENOL) 325 mg tablet  Self No No   Sig: Take 3 tablets (975 mg total) by mouth every 8 (eight) hours   albuterol (Ventolin HFA) 90 mcg/act inhaler  Self No No   Sig: Inhale 2 puffs every 6 (six) hours as needed for wheezing   amLODIPine (NORVASC) 5 mg tablet  Self No No   Sig: Take 1 tablet (5 mg total) by mouth daily   estradiol (ESTRACE VAGINAL) 0.1 mg/g vaginal cream  Self No No   Sig: Apply pea sized amount around urethra and inside vaginal opening 3 times weekly   famotidine (PEPCID) 40 MG tablet   No No   Sig: Take 1 tablet (40 mg total) by mouth daily   ferrous sulfate 324 (65 Fe) mg   No No   Sig: Take 1 tablet (324 mg total) by mouth every other day   gabapentin (Neurontin) 300 mg capsule  Self No No   Sig: Take 1 capsule (300 mg total) by mouth daily at bedtime   insulin aspart (NovoLOG) 100 units/mL injection  Self Yes No   Sig: Inject under the skin 3 (three) times a day before meals 16 units, 16 units, 25 units.    insulin detemir (Levemir FlexTouch) 100 Units/mL injection pen  Self Yes No   Sig: Inject 50 Units under the skin every evening    levothyroxine 112 mcg tablet  Self Yes No   Sig: Take 112 mcg by mouth every morning   metoprolol tartrate (LOPRESSOR) 50 mg tablet  Self No No   Sig: Take 1 tablet (50 mg total) by mouth every 12 (twelve) hours   pantoprazole (PROTONIX) 40 mg tablet  Self No No   Sig: TAKE 1 TABLET BY MOUTH TWICE A DAY   pramipexole (MIRAPEX) 0.25 mg tablet  Self No No   Sig: Take 1 tablet (0.25 mg total) by mouth daily at bedtime   rosuvastatin (CRESTOR) 5 mg tablet  Self No No   Sig: Take 1 tablet (5 mg total) by mouth daily   senna (SENOKOT) 8.6 mg   No No   Sig: Take 1 tablet (8.6 mg total) by mouth daily at bedtime   torsemide (DEMADEX) 10 mg tablet   No No   Sig: Take 0.5 tablets (5 mg total) by mouth every other day Take this medication only if your weight exceeds 172 pounds      Facility-Administered Medications: None       Past Medical History:   Diagnosis Date   • Actinic keratosis    • Acute cystitis without hematuria 04/28/2023   • Acute cystitis without hematuria 04/28/2023   • Acute-on-chronic kidney injury [facility])    • ANDRA (acute kidney injury) ([address]) 05/08/2020   • Allergic    • Allergic rhinitis    • Anesthesia     pt reports "had to use double lumen endo tube for hiatal hernia repair/so surgeon could get to where he needed to work"   • Arthritis    • Aspiration into airway    • Ahumada esophagus 1993    Lot of stress with children   • Basal cell carcinoma 2007    left cheek    • BCC (basal cell carcinoma) 05/27/2021    Left Nasal tip   • Breast discharge 06/19/2023   • Breast pain 06/19/2023   • Cancer ([address])     squamous cell cancer on forhead   • Cancer ([address])     basal cell on nose    • Cholelithiasis    • Chronic kidney disease 2000, 2018    Stones, kidney disease stage 4   • Chronic pain disorder     bilat feet and joint pain on occas   • Colon polyp    • Constipation    • COPD (chronic obstructive pulmonary disease) ([address])    • COVID-19 08/2021    recovered at home/did receive monoclonal infusion   • COVID-19 virus infection 08/16/2021   • Dental bridge present    • Depression    • Diabetes mellitus ([address])     Type 2   • Diabetic neuropathy ([address])    • Disease of thyroid gland    • Dyspnea    • Elevated liver enzymes 04/04/2023   • Epigastric abdominal pain with severe diabetic gastroparesis, status post EGD/Botox injection, 5/14 05/17/2021   • Facial abrasion, initial encounter 03/22/2023   • Fall 03/22/2023   • Family history of thyroid problem    • Fatty liver    • Fracture of orbital floor, blow-out, right, closed, with routine healing, subsequent encounter 03/22/2023   • GERD (gastroesophageal reflux disease)    • Heart burn    • Hiatal hernia    • History of colon polyps 07/30/2021   • History of kidney stones 09/29/2020    Hx of recurrent kidney stones   • History of kidney stones 09/29/2020    Hx of recurrent kidney stones  Formatting of this note might be different from the original. Hx of recurrent kidney stones  Last Assessment & Plan:  Formatting of this note might be different from the original. We will set her up for follow-up in 3 months with a KUB prior. She knows to call if she has any kidney stone type pain in the meantime. • History of pneumonia    • History of repair of hiatal hernia 05/03/2020   • Hyperlipidemia    • Hyperplasia, parathyroid ([address])    • Hypertension    • Hypotension 04/13/2022   • Kidney problem    • Kidney stone    • Memory loss Julu2 2020   • Motion sickness    • Motion sickness    • Multiple closed fractures of ribs of right side 03/22/2023   • Nasal bones, closed fracture 03/22/2023   • Neck pain    • Obesity 1978   • Obesity (BMI 30.0-34. 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, left     great toe fusion   • Urinary incontinence    • Urinary tract infection 03/28/2022   • Wears glasses        Past Surgical History:   Procedure Laterality Date   • ABDOMINAL SURGERY  [id number]    Nissen x2 1972 tubal ligarion   • ARTHROSCOPY KNEE     • BREAST BIOPSY      stereotactic-benign   • BREAST BIOPSY      stereo-benign   • BREAST CYST EXCISION Bilateral     benign   • BREAST EXCISIONAL BIOPSY      unknown date-benign   • BREAST EXCISIONAL BIOPSY      unknown date-benign   • BREAST EXCISIONAL BIOPSY      unknown date-benign   • BREAST EXCISIONAL BIOPSY      unknown age-benign   • CARDIAC CATHETERIZATION     • CHOLECYSTECTOMY     • COLONOSCOPY     • EXAMINATION UNDER ANESTHESIA N/A 06/24/2021    Procedure: EXAM UNDER ANESTHESIA (EUA), DISE;  Surgeon: Alicia Wolf MD;  [facility];  Service: ENT   • HERNIA REPAIR     • HIATAL HERNIA REPAIR     • KNEE SURGERY      Torn maniscus lap surg   • LIPOSUCTION     • LYMPH NODE BIOPSY     • MOHS SURGERY  05/20/2021    left mid forehead-Gautam   • MOHS SURGERY Left 05/27/2021    Left nasal tip- gautam    • FL LIGATION/BIOPSY TEMPORAL ARTERY Left 01/05/2023    Procedure: BIOPSY ARTERY TEMPORAL;  Surgeon: Silvana Cano DO;  [facility];  Service: Vascular   • FL OPEN IMPLANTATION CRANIAL NERVE VASQUEZ & PULSE GEN N/A 11/10/2021    Procedure: INSERTION UPPER AIRWAY STIMULATOR, INSPIRE IMPLANT;  Surgeon: Alicia Wolf MD;  [facility];  Service: ENT   • FL UNLISTED PROCEDURE FOOT/TOES Right 07/19/2022    Procedure: 1st metatarsal phalangeal joint fusion;  Surgeon: Madonna Schwartz DPM;  [facility];  Service: Podiatry   • REDUCTION MAMMAPLASTY Bilateral 2000   • REDUCTION MAMMAPLASTY     • SKIN BIOPSY     • SKIN CANCER EXCISION  2007    squamous cell carcinoma    • SKIN CANCER EXCISION  2007    basal cell carcinoma   • SKIN CANCER EXCISION  2023    SCCIS chest   • SQUAMOUS CELL CARCINOMA EXCISION     • TOE SURGERY Right 08/04/2022    Right Great Toe Fusion   • TONSILLECTOMY     • TUBAL LIGATION     • UPPER GASTROINTESTINAL ENDOSCOPY     • US GUIDED BREAST BIOPSY RIGHT COMPLETE Right 07/18/2022       Family History   Problem Relation Age of Onset   • Heart disease Mother    • Depression Mother    • Hypertension Mother    • COPD Mother    • Hearing loss Mother    • Anxiety disorder Mother    • Heart disease Father    • Lung cancer Father 79        Smoker    • Cancer Father         brain   • Alcohol abuse Father    • Dementia Father    • Hypertension Father    • Thyroid disease Father    • COPD Father    • Arthritis Father    • Brain cancer Father 76   • Hypertension Sister    • Diabetes Sister    • Heart disease Sister    • Thyroid disease Sister    • Cancer Sister         Lympoma, lung   • Lung cancer Sister 78   • Anxiety disorder Sister    • Hypertension Brother    • Diabetes Brother    • Cancer Brother         Throat   • Dementia Brother    • Stroke Brother    • Hypertension Brother    • Heart disease Brother    • Diabetes Brother    • Hypertension Brother    • No Known Problems Son    • No Known Problems Son    • Brain cancer Paternal Aunt         unknown age   • Diabetes Sister    • Hypertension Sister    • Diabetes Brother    • Breast cancer Neg Hx      I have reviewed and agree with the history as documented. E-Cigarette/Vaping   • E-Cigarette Use Never User      E-Cigarette/Vaping Substances   • Nicotine No    • THC No    • CBD No    • Flavoring No    • Other No    • Unknown No      Social History     Tobacco Use   • Smoking status: Former     Packs/day: 2.00     Years: 10.00     Total pack years: 20.00     Types: Cigarettes     Start date: 1968     Quit date: 1976     Years since quittin.7     Passive exposure: Past   • Smokeless tobacco: Never   • Tobacco comments:     Smoked 2 pack a day   Vaping Use   • Vaping Use: Never used   Substance Use Topics   • Alcohol use: Yes     Comment: 1 or 2 a year   • Drug use: No        Review of Systems   Constitutional: Negative for chills, fatigue and fever. HENT: Negative for congestion, ear pain and sore throat. Eyes: Negative for photophobia, pain and visual disturbance. Respiratory: Negative for cough, shortness of breath, wheezing and stridor.     Cardiovascular: Negative for chest pain, palpitations and leg swelling. Gastrointestinal: Negative for abdominal distention, abdominal pain, constipation, diarrhea, nausea and vomiting. Endocrine: Negative. Genitourinary: Negative for dysuria and hematuria. Musculoskeletal: Negative for arthralgias, back pain, neck pain and neck stiffness. Skin: Negative for color change and rash. Allergic/Immunologic: Negative. Neurological: Negative for dizziness, seizures, syncope, weakness, light-headedness, numbness and headaches. Hematological: Negative. Psychiatric/Behavioral: Positive for confusion. All other systems reviewed and are negative. Physical Exam  ED Triage Vitals   Temperature Pulse Respirations Blood Pressure SpO2   09/06/23 0627 09/06/23 0625 09/06/23 0625 09/06/23 0625 09/06/23 0625   98 °F (36.7 °C) 67 18 167/89 97 %      Temp src Heart Rate Source Patient Position - Orthostatic VS BP Location FiO2 (%)   -- 09/06/23 0700 09/06/23 1015 09/06/23 1015 --    Monitor Sitting Right arm       Pain Score       --                    Orthostatic Vital Signs  Vitals:    09/06/23 1030 09/06/23 1100 09/06/23 1200 09/06/23 1300   BP: 165/74 162/74 163/78 154/78   Pulse: 56 (!) 54 59 66   Patient Position - Orthostatic VS: Sitting          Physical Exam  Vitals and nursing note reviewed. Constitutional:       General: She is not in acute distress. Appearance: She is well-developed. She is not ill-appearing. HENT:      Head: Normocephalic and atraumatic. Mouth/Throat:      Mouth: Mucous membranes are moist.      Pharynx: Oropharynx is clear. Eyes:      Extraocular Movements: Extraocular movements intact. Conjunctiva/sclera: Conjunctivae normal.      Pupils: Pupils are equal, round, and reactive to light. Cardiovascular:      Rate and Rhythm: Normal rate and regular rhythm. Heart sounds: Normal heart sounds. No murmur heard. No friction rub. Pulmonary:      Effort: Pulmonary effort is normal. No respiratory distress. Breath sounds: Normal breath sounds. No stridor. No wheezing, rhonchi or rales. Abdominal:      General: Bowel sounds are normal. There is no distension. Palpations: Abdomen is soft. There is no mass. Tenderness: There is no abdominal tenderness. There is no guarding. Musculoskeletal:         General: No swelling or tenderness. Normal range of motion. Cervical back: Normal range of motion and neck supple. No rigidity. Lymphadenopathy:      Cervical: No cervical adenopathy. Skin:     General: Skin is warm and dry. Capillary Refill: Capillary refill takes less than 2 seconds. Coloration: Skin is not cyanotic or pale. Findings: No erythema or rash. Neurological:      Mental Status: She is alert. She is confused. GCS: GCS eye subscore is 4. GCS verbal subscore is 4. GCS motor subscore is 6. Cranial Nerves: No cranial nerve deficit, dysarthria or facial asymmetry. Sensory: No sensory deficit. Motor: No weakness. Coordination: Romberg sign negative. Psychiatric:         Mood and Affect: Mood normal.         ED Medications  Medications   ceftriaxone (ROCEPHIN) 1 g/50 mL in dextrose IVPB (0 mg Intravenous Stopped 9/6/23 1233)       Diagnostic Studies  Results Reviewed     Procedure Component Value Units Date/Time    Fingerstick Glucose (POCT) [[id number]]  (Normal) Collected: 09/06/23 1308    Lab Status: Final result Updated: 09/06/23 1309     POC Glucose 140 mg/dl     Fingerstick Glucose (POCT) [[id number]]  (Normal) Collected: 09/06/23 1221    Lab Status: Final result Updated: 09/06/23 1221     POC Glucose 69 mg/dl     Urine Microscopic [[id number]]  (Abnormal) Collected: 09/06/23 1008    Lab Status: Final result Specimen: Urine, Clean Catch Updated: 09/06/23 1025     RBC, UA 1-2 /hpf      WBC, UA 10-20 /hpf      Epithelial Cells Occasional /hpf      Bacteria, UA Innumerable /hpf     Urine culture [[id number]] Collected: 09/06/23 1008    Lab Status:  In process Specimen: Urine, Clean Catch Updated: 09/06/23 1025    UA w Reflex to Microscopic w Reflex to Culture [[id number]]  (Abnormal) Collected: 09/06/23 1008    Lab Status: Final result Specimen: Urine, Clean Catch Updated: 09/06/23 1018     Color, UA Light Yellow     Clarity, UA Clear     Specific Gravity, UA 1.014     pH, UA 6.5     Leukocytes, UA Small     Nitrite, UA Negative     Protein,  (3+) mg/dl      Glucose, UA Trace mg/dl      Ketones, UA Negative mg/dl      Urobilinogen, UA <2.0 mg/dl      Bilirubin, UA Negative     Occult Blood, UA Negative    TSH, 3rd generation with Free T4 reflex [[id number]]  (Abnormal) Collected: 09/06/23 0711    Lab Status: Final result Specimen: Blood from Arm, Right Updated: 09/06/23 0801     TSH 3RD GENERATON 15.028 uIU/mL     T4, free [[id number]] Collected: 09/06/23 0711    Lab Status:  In process Specimen: Blood from Arm, Right Updated: 09/06/23 0801    Comprehensive metabolic panel [[id number]]  (Abnormal) Collected: 09/06/23 0711    Lab Status: Final result Specimen: Blood from Arm, Right Updated: 09/06/23 0745     Sodium 137 mmol/L      Potassium 4.0 mmol/L      Chloride 100 mmol/L      CO2 27 mmol/L      ANION GAP 10 mmol/L      BUN 24 mg/dL      Creatinine 2.07 mg/dL      Glucose 216 mg/dL      Calcium 9.5 mg/dL      AST 25 U/L      ALT 26 U/L      Alkaline Phosphatase 73 U/L      Total Protein 6.6 g/dL      Albumin 4.2 g/dL      Total Bilirubin 0.38 mg/dL      eGFR 23 ml/min/1.73sq m     Narrative:      [place] guidelines for Chronic Kidney Disease (CKD):   •  Stage 1 with normal or high GFR (GFR > 90 mL/min/1.73 square meters)  •  Stage 2 Mild CKD (GFR = 60-89 mL/min/1.73 square meters)  •  Stage 3A Moderate CKD (GFR = 45-59 mL/min/1.73 square meters)  •  Stage 3B Moderate CKD (GFR = 30-44 mL/min/1.73 square meters)  •  Stage 4 Severe CKD (GFR = 15-29 mL/min/1.73 square meters)  •  Stage 5 End Stage CKD (GFR <15 mL/min/1.73 square meters)  Note: GFR calculation is accurate only with a steady state creatinine    Salicylate level [[id number]]  (Normal) Collected: 09/06/23 0711    Lab Status: Final result Specimen: Blood from Arm, Right Updated: 02/79/84 6972     Salicylate Lvl <5 mg/dL     Acetaminophen level-If concentration is detectable, please discuss with medical  on call. [[id number]]  (Abnormal) Collected: 09/06/23 0711    Lab Status: Final result Specimen: Blood from Arm, Right Updated: 09/06/23 0745     Acetaminophen Level <10 ug/mL     Ethanol [[id number]]  (Normal) Collected: 09/06/23 0711    Lab Status: Final result Specimen: Blood from Arm, Right Updated: 09/06/23 0744     Ethanol Lvl <10 mg/dL     Ammonia [[id number]]  (Normal) Collected: 09/06/23 0711    Lab Status: Final result Specimen: Blood from Arm, Right Updated: 09/06/23 0743     Ammonia 37 umol/L     CBC and differential [[id number]]  (Abnormal) Collected: 09/06/23 0711    Lab Status: Final result Specimen: Blood from Arm, Right Updated: 09/06/23 0724     WBC 8.67 Thousand/uL      RBC 4.36 Million/uL      Hemoglobin 12.2 g/dL      Hematocrit 38.3 %      MCV 88 fL      MCH 28.0 pg      MCHC 31.9 g/dL      RDW 16.7 %      MPV 10.8 fL      Platelets 253 Thousands/uL      nRBC 0 /100 WBCs      Neutrophils Relative 67 %      Immat GRANS % 1 %      Lymphocytes Relative 24 %      Monocytes Relative 5 %      Eosinophils Relative 3 %      Basophils Relative 0 %      Neutrophils Absolute 5.87 Thousands/µL      Immature Grans Absolute 0.04 Thousand/uL      Lymphocytes Absolute 2.04 Thousands/µL      Monocytes Absolute 0.43 Thousand/µL      Eosinophils Absolute 0.26 Thousand/µL      Basophils Absolute 0.03 Thousands/µL                  CT head without contrast   Final Result by Gio Ga MD (09/06 9076)      No acute intracranial abnormality. Stable chronic microangiopathic changes within the brain.                   Workstation performed: [id number] Procedures  ECG 12 Lead Documentation Only    Date/Time: 9/6/2023 7:33 AM    Performed by: Loni Serrano DO  Authorized by: Loni Serrano DO    Indications / Diagnosis:  Altered mental status  ECG reviewed by me, the ED Provider: yes    Patient location:  ED  Previous ECG:     Previous ECG:  Compared to current    Comparison ECG info:  Now has first-degree AV block    Similarity:  Changes noted    Comparison to cardiac monitor: No    Interpretation:     Interpretation: abnormal    Quality:     Tracing quality:  Limited by artifact  Rate:     ECG rate:  62    ECG rate assessment: normal    Rhythm:     Rhythm: A-V block    Ectopy:     Ectopy: none    QRS:     QRS axis:  Normal    QRS intervals:  Normal  Conduction:     Conduction: abnormal      Abnormal conduction: 1st degree    ST segments:     ST segments:  Normal  T waves:     T waves: normal    Comments:      Sinus rhythm with first-degree AV block new from previous tracing. No evidence of acute ischemia or STEMI. Patient denies chest pain or shortness of breath. ED Course  ED Course as of 09/06/23 1403   Wed Sep 06, 2023   0726 CBC and differential(!)  Unremarkable   0801 TSH 3RD GENERATON(!): 15.028  Doubled in approximately 1 month.   0801 CT head without contrast  IMPRESSION:     No acute intracranial abnormality. Stable chronic microangiopathic changes within the brain. 0813 Coma panel(!)  Unremarkable   0813 Ammonia: 37  Unremarkable   0813 Comprehensive metabolic panel(!)  Elevated creatinine at approximate patient baseline. 0813 ECG 12 lead  Sinus rhythm with first-degree AV block new from previous tracing. No evidence of acute ischemia or STEMI. Patient denies chest pain or shortness of breath. 1106 Bacteria, UA(!): Innumerable  We have initiated Rocephin at this time. 1106 The patient will be admitted for altered mental status with UTI. We are reaching out to inpatient team now.    1256 After discussion with Dr. Margery Bumpers she does not believe that the patient meets requirements for admission. She recommends the patient be discharged for outpatient follow-up with her PCP. Therefore we will write a prescription for an antibiotic to treat her UTI and the patient will be discharged for outpatient follow-up. Return precautions will be discussed. Further instructions per discharge orders. 1103 The patient was given a prescription for cefpodoxime. Urine culture from 8/16 indicated resistance to Macrobid, Keflex and cefdinir. The patient is allergic to ciprofloxacin and Bactrim. She has also been given an ambulatory referral to neurology for increased confusion. Strict return precautions have been discussed. Further instructions per discharge orders. SBIRT 20yo+    Flowsheet Row Most Recent Value   Initial Alcohol Screen: US AUDIT-C     1. How often do you have a drink containing alcohol? 0 Filed at: 09/06/2023 1125   2. How many drinks containing alcohol do you have on a typical day you are drinking? 0 Filed at: 09/06/2023 1125   3a. Male UNDER 65: How often do you have five or more drinks on one occasion? 0 Filed at: 09/06/2023 1125   3b. FEMALE Any Age, or MALE 65+: How often do you have 4 or more drinks on one occassion? 0 Filed at: 09/06/2023 1125   Audit-C Score 0 Filed at: 09/06/2023 1125   ELIO: How many times in the past year have you. .. Used an illegal drug or used a prescription medication for non-medical reasons? Never Filed at: 09/06/2023 1125                Medical Decision Making  The patient is a 70-year-old female with a past medical history of COPD, CKD stage IV, type 2 diabetes, anemia, cancer and a recent hospital admission for ANDRA who presents to the emergency department with complaint of altered mental status. Upon initial presentation the patient was alert and oriented x3 but believed that the year was 2024. Her physical exam was grossly unremarkable.   The differential includes but is not limited to UTI, hepatic encephalopathy, acute kidney injury, dementia or intracranial mass. I have ordered a CBC, CMP, ammonia level, coma panel, UA, EKG, TSH as well as a CT without contrast of her head. Results pending. Amount and/or Complexity of Data Reviewed  Labs: ordered. Radiology: ordered. Disposition  Final diagnoses:   UTI (urinary tract infection)   Elevated TSH   Delirium   Confusion     Time reflects when diagnosis was documented in both MDM as applicable and the Disposition within this note     Time User Action Codes Description Comment    9/6/2023 11:08 AM Kit Booneer Add [R41.0] Delirium     9/6/2023 11:08 AM Chris Lilly Add [N39.0] UTI (urinary tract infection)     9/6/2023 12:57 PM Kit Parrish Add [R79.89] Elevated TSH     9/6/2023  1:59 PM Chris Lilly Modify [N39.0] UTI (urinary tract infection)     9/6/2023  1:59 PM Chris Lilly Remove [R41.0] Delirium     9/6/2023  1:59 PM Pantoja Dicker Add [R41.0] Delirium     9/6/2023  2:01 PM Pantoja Dicker Add [R41.0] Confusion       ED Disposition     ED Disposition   Discharge    Condition   Stable    Date/Time   Wed Sep 6, 2023 12:57 PM    Comment   Fercho Fuentes discharge to home/self care.                Follow-up Information     Follow up With Specialties Details Why Contact Info Additional Information    Thuy Gillis MD Internal Medicine Schedule an appointment as soon as possible for a visit  For elevated TSH levels as well as continuing UTI. [address] [facility] 609 [facility] Emergency Department Emergency Medicine  As needed [address]  [phone] Emergency Department, [place], [place], [address] [place] [address] [facility] Neurology Schedule an appointment as soon as possible for a visit  For continuing confusion [facility] [address] [facility] [address]  [address] [facility] [address] [place], 701 [facility] [address], [place], [place], [address] [place] [address]          Patient's Medications   Discharge Prescriptions    CEFPODOXIME (VANTIN) 100 MG TABLET    Take 1 tablet (100 mg total) by mouth 2 (two) times a day for 7 days       Start Date: 9/6/2023  End Date: 9/13/2023       Order Dose: 100 mg       Quantity: 14 tablet    Refills: 0         PDMP Review       Value Time User    PDMP Reviewed  Yes 3/24/2023  9:59 PM Hayde Bailon MD           ED Provider  Attending physically available and evaluated Garfield Cunha. I managed the patient along with the ED Attending.     Electronically Signed by         Radha Gonzalez DO  09/06/23 [id number]

## 2023-09-07 ENCOUNTER — VBI (OUTPATIENT)
Dept: INTERNAL MEDICINE CLINIC | Facility: CLINIC | Age: 73
End: 2023-09-07

## 2023-09-07 NOTE — TELEPHONE ENCOUNTER
09/07/23 8:55 AM    Patient contacted post ED visit, [facility] department spoke with patient/caregiver and outreach was successful. Thank you.   Killian Celis  [facility]

## 2023-09-08 LAB — BACTERIA UR CULT: ABNORMAL

## 2023-09-09 ENCOUNTER — APPOINTMENT (EMERGENCY)
Dept: RADIOLOGY | Facility: HOSPITAL | Age: 73
DRG: 683 | End: 2023-09-09
Payer: MEDICARE

## 2023-09-09 ENCOUNTER — HOSPITAL ENCOUNTER (INPATIENT)
Facility: HOSPITAL | Age: 73
LOS: 3 days | Discharge: HOME | DRG: 683 | End: 2023-09-12
Attending: EMERGENCY MEDICINE | Admitting: INTERNAL MEDICINE
Payer: MEDICARE

## 2023-09-09 DIAGNOSIS — E86.0 DEHYDRATION: ICD-10-CM

## 2023-09-09 DIAGNOSIS — F03.918 BEHAVIORAL AND PSYCHOLOGICAL SYMPTOMS OF DEMENTIA (HCC): ICD-10-CM

## 2023-09-09 DIAGNOSIS — N39.0 UTI (URINARY TRACT INFECTION): ICD-10-CM

## 2023-09-09 DIAGNOSIS — R41.82 ALTERED MENTAL STATUS: Primary | ICD-10-CM

## 2023-09-09 DIAGNOSIS — R55 POSTURAL DIZZINESS WITH PRESYNCOPE: ICD-10-CM

## 2023-09-09 DIAGNOSIS — K21.9 GASTROESOPHAGEAL REFLUX DISEASE WITHOUT ESOPHAGITIS: ICD-10-CM

## 2023-09-09 DIAGNOSIS — N17.9 AKI (ACUTE KIDNEY INJURY) (HCC): ICD-10-CM

## 2023-09-09 DIAGNOSIS — R42 POSTURAL DIZZINESS WITH PRESYNCOPE: ICD-10-CM

## 2023-09-09 PROBLEM — I12.9 PARENCHYMAL RENAL HYPERTENSION: Status: ACTIVE | Noted: 2023-09-09

## 2023-09-09 LAB
ALBUMIN SERPL BCP-MCNC: 3.9 G/DL (ref 3.5–5)
ALP SERPL-CCNC: 65 U/L (ref 34–104)
ALT SERPL W P-5'-P-CCNC: 20 U/L (ref 7–52)
ANION GAP SERPL CALCULATED.3IONS-SCNC: 9 MMOL/L
APAP SERPL-MCNC: <10 UG/ML (ref 10–20)
AST SERPL W P-5'-P-CCNC: 16 U/L (ref 13–39)
ATRIAL RATE: 67 BPM
BACTERIA UR QL AUTO: ABNORMAL /HPF
BASOPHILS # BLD AUTO: 0.05 THOUSANDS/ÂΜL (ref 0–0.1)
BASOPHILS NFR BLD AUTO: 1 % (ref 0–1)
BILIRUB SERPL-MCNC: 0.35 MG/DL (ref 0.2–1)
BILIRUB UR QL STRIP: NEGATIVE
BUN SERPL-MCNC: 26 MG/DL (ref 5–25)
CALCIUM SERPL-MCNC: 8.9 MG/DL (ref 8.4–10.2)
CARDIAC TROPONIN I PNL SERPL HS: 6 NG/L
CHLORIDE SERPL-SCNC: 101 MMOL/L (ref 96–108)
CK SERPL-CCNC: 66 U/L (ref 26–192)
CLARITY UR: CLEAR
CO2 SERPL-SCNC: 27 MMOL/L (ref 21–32)
COLOR UR: ABNORMAL
CREAT SERPL-MCNC: 2.59 MG/DL (ref 0.6–1.3)
EOSINOPHIL # BLD AUTO: 0.23 THOUSAND/ÂΜL (ref 0–0.61)
EOSINOPHIL NFR BLD AUTO: 2 % (ref 0–6)
ERYTHROCYTE [DISTWIDTH] IN BLOOD BY AUTOMATED COUNT: 16.9 % (ref 11.6–15.1)
ETHANOL SERPL-MCNC: <10 MG/DL
FLUAV RNA RESP QL NAA+PROBE: NEGATIVE
FLUBV RNA RESP QL NAA+PROBE: NEGATIVE
GFR SERPL CREATININE-BSD FRML MDRD: 17 ML/MIN/1.73SQ M
GLUCOSE SERPL-MCNC: 158 MG/DL (ref 65–140)
GLUCOSE SERPL-MCNC: 191 MG/DL (ref 65–140)
GLUCOSE SERPL-MCNC: 195 MG/DL (ref 65–140)
GLUCOSE SERPL-MCNC: 240 MG/DL (ref 65–140)
GLUCOSE UR STRIP-MCNC: ABNORMAL MG/DL
HCT VFR BLD AUTO: 37.1 % (ref 34.8–46.1)
HGB BLD-MCNC: 11.7 G/DL (ref 11.5–15.4)
HGB UR QL STRIP.AUTO: NEGATIVE
HYALINE CASTS #/AREA URNS LPF: ABNORMAL /LPF
IMM GRANULOCYTES # BLD AUTO: 0.05 THOUSAND/UL (ref 0–0.2)
IMM GRANULOCYTES NFR BLD AUTO: 1 % (ref 0–2)
KETONES UR STRIP-MCNC: NEGATIVE MG/DL
LACTATE SERPL-SCNC: 1.8 MMOL/L (ref 0.5–2)
LEUKOCYTE ESTERASE UR QL STRIP: NEGATIVE
LYMPHOCYTES # BLD AUTO: 2.89 THOUSANDS/ÂΜL (ref 0.6–4.47)
LYMPHOCYTES NFR BLD AUTO: 30 % (ref 14–44)
MAGNESIUM SERPL-MCNC: 1.8 MG/DL (ref 1.9–2.7)
MCH RBC QN AUTO: 28.2 PG (ref 26.8–34.3)
MCHC RBC AUTO-ENTMCNC: 31.5 G/DL (ref 31.4–37.4)
MCV RBC AUTO: 89 FL (ref 82–98)
MONOCYTES # BLD AUTO: 0.69 THOUSAND/ÂΜL (ref 0.17–1.22)
MONOCYTES NFR BLD AUTO: 7 % (ref 4–12)
MUCOUS THREADS UR QL AUTO: ABNORMAL
NEUTROPHILS # BLD AUTO: 5.87 THOUSANDS/ÂΜL (ref 1.85–7.62)
NEUTS SEG NFR BLD AUTO: 59 % (ref 43–75)
NITRITE UR QL STRIP: NEGATIVE
NON-SQ EPI CELLS URNS QL MICRO: ABNORMAL /HPF
NRBC BLD AUTO-RTO: 0 /100 WBCS
P AXIS: 42 DEGREES
PH UR STRIP.AUTO: 6.5 [PH]
PLATELET # BLD AUTO: 265 THOUSANDS/UL (ref 149–390)
PMV BLD AUTO: 10.5 FL (ref 8.9–12.7)
POTASSIUM SERPL-SCNC: 4.6 MMOL/L (ref 3.5–5.3)
PR INTERVAL: 236 MS
PROCALCITONIN SERPL-MCNC: 0.09 NG/ML
PROT SERPL-MCNC: 6 G/DL (ref 6.4–8.4)
PROT UR STRIP-MCNC: ABNORMAL MG/DL
QRS AXIS: 0 DEGREES
QRSD INTERVAL: 84 MS
QT INTERVAL: 414 MS
QTC INTERVAL: 437 MS
RBC # BLD AUTO: 4.15 MILLION/UL (ref 3.81–5.12)
RBC #/AREA URNS AUTO: ABNORMAL /HPF
RSV RNA RESP QL NAA+PROBE: NEGATIVE
SALICYLATES SERPL-MCNC: <5 MG/DL (ref 3–20)
SARS-COV-2 RNA RESP QL NAA+PROBE: NEGATIVE
SODIUM SERPL-SCNC: 137 MMOL/L (ref 135–147)
SP GR UR STRIP.AUTO: 1.02 (ref 1–1.03)
T WAVE AXIS: 189 DEGREES
UROBILINOGEN UR STRIP-ACNC: <2 MG/DL
VENTRICULAR RATE: 67 BPM
WBC # BLD AUTO: 9.78 THOUSAND/UL (ref 4.31–10.16)
WBC #/AREA URNS AUTO: ABNORMAL /HPF

## 2023-09-09 PROCEDURE — 87040 BLOOD CULTURE FOR BACTERIA: CPT | Performed by: EMERGENCY MEDICINE

## 2023-09-09 PROCEDURE — 82550 ASSAY OF CK (CPK): CPT | Performed by: EMERGENCY MEDICINE

## 2023-09-09 PROCEDURE — 84484 ASSAY OF TROPONIN QUANT: CPT | Performed by: EMERGENCY MEDICINE

## 2023-09-09 PROCEDURE — 93010 ELECTROCARDIOGRAM REPORT: CPT | Performed by: INTERNAL MEDICINE

## 2023-09-09 PROCEDURE — 96360 HYDRATION IV INFUSION INIT: CPT

## 2023-09-09 PROCEDURE — 96361 HYDRATE IV INFUSION ADD-ON: CPT

## 2023-09-09 PROCEDURE — 82948 REAGENT STRIP/BLOOD GLUCOSE: CPT

## 2023-09-09 PROCEDURE — 81001 URINALYSIS AUTO W/SCOPE: CPT | Performed by: EMERGENCY MEDICINE

## 2023-09-09 PROCEDURE — 99285 EMERGENCY DEPT VISIT HI MDM: CPT

## 2023-09-09 PROCEDURE — 84145 PROCALCITONIN (PCT): CPT | Performed by: EMERGENCY MEDICINE

## 2023-09-09 PROCEDURE — 0241U HB NFCT DS VIR RESP RNA 4 TRGT: CPT | Performed by: EMERGENCY MEDICINE

## 2023-09-09 PROCEDURE — 80179 DRUG ASSAY SALICYLATE: CPT | Performed by: EMERGENCY MEDICINE

## 2023-09-09 PROCEDURE — 80053 COMPREHEN METABOLIC PANEL: CPT | Performed by: EMERGENCY MEDICINE

## 2023-09-09 PROCEDURE — 82077 ASSAY SPEC XCP UR&BREATH IA: CPT | Performed by: EMERGENCY MEDICINE

## 2023-09-09 PROCEDURE — 93005 ELECTROCARDIOGRAM TRACING: CPT

## 2023-09-09 PROCEDURE — 99285 EMERGENCY DEPT VISIT HI MDM: CPT | Performed by: EMERGENCY MEDICINE

## 2023-09-09 PROCEDURE — 85025 COMPLETE CBC W/AUTO DIFF WBC: CPT | Performed by: EMERGENCY MEDICINE

## 2023-09-09 PROCEDURE — 71045 X-RAY EXAM CHEST 1 VIEW: CPT

## 2023-09-09 PROCEDURE — 36415 COLL VENOUS BLD VENIPUNCTURE: CPT | Performed by: EMERGENCY MEDICINE

## 2023-09-09 PROCEDURE — 80143 DRUG ASSAY ACETAMINOPHEN: CPT | Performed by: EMERGENCY MEDICINE

## 2023-09-09 PROCEDURE — 99223 1ST HOSP IP/OBS HIGH 75: CPT | Performed by: INTERNAL MEDICINE

## 2023-09-09 PROCEDURE — 83605 ASSAY OF LACTIC ACID: CPT | Performed by: EMERGENCY MEDICINE

## 2023-09-09 PROCEDURE — 83735 ASSAY OF MAGNESIUM: CPT | Performed by: EMERGENCY MEDICINE

## 2023-09-09 RX ORDER — INSULIN LISPRO 100 [IU]/ML
25 INJECTION, SOLUTION INTRAVENOUS; SUBCUTANEOUS
Status: DISCONTINUED | OUTPATIENT
Start: 2023-09-09 | End: 2023-09-12 | Stop reason: HOSPADM

## 2023-09-09 RX ORDER — SACCHAROMYCES BOULARDII 250 MG
250 CAPSULE ORAL 2 TIMES DAILY
Status: DISCONTINUED | OUTPATIENT
Start: 2023-09-09 | End: 2023-09-12 | Stop reason: HOSPADM

## 2023-09-09 RX ORDER — INSULIN LISPRO 100 [IU]/ML
16 INJECTION, SOLUTION INTRAVENOUS; SUBCUTANEOUS
Status: DISCONTINUED | OUTPATIENT
Start: 2023-09-09 | End: 2023-09-12 | Stop reason: HOSPADM

## 2023-09-09 RX ORDER — PRAMIPEXOLE DIHYDROCHLORIDE 0.25 MG/1
0.25 TABLET ORAL
Status: DISCONTINUED | OUTPATIENT
Start: 2023-09-09 | End: 2023-09-12 | Stop reason: HOSPADM

## 2023-09-09 RX ORDER — SENNOSIDES 8.6 MG
8.6 TABLET ORAL
Status: DISCONTINUED | OUTPATIENT
Start: 2023-09-09 | End: 2023-09-12 | Stop reason: HOSPADM

## 2023-09-09 RX ORDER — FERROUS SULFATE 325(65) MG
325 TABLET ORAL EVERY OTHER DAY
Status: DISCONTINUED | OUTPATIENT
Start: 2023-09-09 | End: 2023-09-12 | Stop reason: HOSPADM

## 2023-09-09 RX ORDER — CHLORAL HYDRATE 500 MG
1000 CAPSULE ORAL DAILY
Status: DISCONTINUED | OUTPATIENT
Start: 2023-09-09 | End: 2023-09-12 | Stop reason: HOSPADM

## 2023-09-09 RX ORDER — LANOLIN ALCOHOL/MO/W.PET/CERES
3 CREAM (GRAM) TOPICAL
Status: DISCONTINUED | OUTPATIENT
Start: 2023-09-09 | End: 2023-09-12 | Stop reason: HOSPADM

## 2023-09-09 RX ORDER — SODIUM CHLORIDE 9 MG/ML
75 INJECTION, SOLUTION INTRAVENOUS CONTINUOUS
Status: DISCONTINUED | OUTPATIENT
Start: 2023-09-09 | End: 2023-09-09

## 2023-09-09 RX ORDER — ACETAMINOPHEN 325 MG/1
975 TABLET ORAL EVERY 8 HOURS PRN
Status: DISCONTINUED | OUTPATIENT
Start: 2023-09-09 | End: 2023-09-11

## 2023-09-09 RX ORDER — GABAPENTIN 300 MG/1
300 CAPSULE ORAL
Status: DISCONTINUED | OUTPATIENT
Start: 2023-09-09 | End: 2023-09-12 | Stop reason: HOSPADM

## 2023-09-09 RX ORDER — HEPARIN SODIUM 5000 [USP'U]/ML
5000 INJECTION, SOLUTION INTRAVENOUS; SUBCUTANEOUS EVERY 8 HOURS SCHEDULED
Status: DISCONTINUED | OUTPATIENT
Start: 2023-09-09 | End: 2023-09-12 | Stop reason: HOSPADM

## 2023-09-09 RX ORDER — PANTOPRAZOLE SODIUM 40 MG/1
40 TABLET, DELAYED RELEASE ORAL 2 TIMES DAILY
Status: DISCONTINUED | OUTPATIENT
Start: 2023-09-09 | End: 2023-09-12 | Stop reason: HOSPADM

## 2023-09-09 RX ORDER — INSULIN LISPRO 100 [IU]/ML
1-5 INJECTION, SOLUTION INTRAVENOUS; SUBCUTANEOUS
Status: DISCONTINUED | OUTPATIENT
Start: 2023-09-09 | End: 2023-09-12 | Stop reason: HOSPADM

## 2023-09-09 RX ORDER — DULOXETIN HYDROCHLORIDE 60 MG/1
60 CAPSULE, DELAYED RELEASE ORAL DAILY
Status: DISCONTINUED | OUTPATIENT
Start: 2023-09-09 | End: 2023-09-11

## 2023-09-09 RX ORDER — AMLODIPINE BESYLATE 5 MG/1
5 TABLET ORAL DAILY
Status: DISCONTINUED | OUTPATIENT
Start: 2023-09-09 | End: 2023-09-12 | Stop reason: HOSPADM

## 2023-09-09 RX ORDER — METOPROLOL TARTRATE 50 MG/1
50 TABLET, FILM COATED ORAL EVERY 12 HOURS SCHEDULED
Status: DISCONTINUED | OUTPATIENT
Start: 2023-09-09 | End: 2023-09-12 | Stop reason: HOSPADM

## 2023-09-09 RX ORDER — CALCIUM CARBONATE 500(1250)
1 TABLET ORAL
Status: DISCONTINUED | OUTPATIENT
Start: 2023-09-09 | End: 2023-09-12 | Stop reason: HOSPADM

## 2023-09-09 RX ORDER — ESTRADIOL 0.1 MG/G
1 CREAM VAGINAL 3 TIMES WEEKLY
Status: DISCONTINUED | OUTPATIENT
Start: 2023-09-11 | End: 2023-09-12 | Stop reason: HOSPADM

## 2023-09-09 RX ORDER — ALBUTEROL SULFATE 90 UG/1
2 AEROSOL, METERED RESPIRATORY (INHALATION) EVERY 6 HOURS PRN
Status: DISCONTINUED | OUTPATIENT
Start: 2023-09-09 | End: 2023-09-12 | Stop reason: HOSPADM

## 2023-09-09 RX ORDER — ATORVASTATIN CALCIUM 10 MG/1
10 TABLET, FILM COATED ORAL
Status: DISCONTINUED | OUTPATIENT
Start: 2023-09-09 | End: 2023-09-12 | Stop reason: HOSPADM

## 2023-09-09 RX ORDER — LEVOTHYROXINE SODIUM 112 UG/1
112 TABLET ORAL EVERY MORNING
Status: DISCONTINUED | OUTPATIENT
Start: 2023-09-10 | End: 2023-09-12 | Stop reason: HOSPADM

## 2023-09-09 RX ORDER — RISPERIDONE 0.25 MG/1
0.25 TABLET ORAL EVERY 4 HOURS PRN
Status: DISCONTINUED | OUTPATIENT
Start: 2023-09-09 | End: 2023-09-11

## 2023-09-09 RX ORDER — FAMOTIDINE 20 MG/1
10 TABLET, FILM COATED ORAL DAILY
Status: DISCONTINUED | OUTPATIENT
Start: 2023-09-09 | End: 2023-09-12 | Stop reason: HOSPADM

## 2023-09-09 RX ORDER — FERROUS SULFATE TAB EC 324 MG (65 MG FE EQUIVALENT) 324 (65 FE) MG
324 TABLET DELAYED RESPONSE ORAL EVERY OTHER DAY
Status: DISCONTINUED | OUTPATIENT
Start: 2023-09-09 | End: 2023-09-09

## 2023-09-09 RX ORDER — ICOSAPENT ETHYL 1000 MG/1
1 CAPSULE ORAL 2 TIMES DAILY
Status: DISCONTINUED | OUTPATIENT
Start: 2023-09-09 | End: 2023-09-09

## 2023-09-09 RX ADMIN — INSULIN LISPRO 1 UNITS: 100 INJECTION, SOLUTION INTRAVENOUS; SUBCUTANEOUS at 16:22

## 2023-09-09 RX ADMIN — INSULIN LISPRO 2 UNITS: 100 INJECTION, SOLUTION INTRAVENOUS; SUBCUTANEOUS at 11:37

## 2023-09-09 RX ADMIN — ACETAMINOPHEN 975 MG: 325 TABLET, FILM COATED ORAL at 16:22

## 2023-09-09 RX ADMIN — FAMOTIDINE 10 MG: 20 TABLET, FILM COATED ORAL at 11:36

## 2023-09-09 RX ADMIN — PRAMIPEXOLE DIHYDROCHLORIDE 0.25 MG: 0.25 TABLET ORAL at 21:36

## 2023-09-09 RX ADMIN — Medication 1 TABLET: at 11:37

## 2023-09-09 RX ADMIN — AMLODIPINE BESYLATE 5 MG: 5 TABLET ORAL at 11:36

## 2023-09-09 RX ADMIN — ATORVASTATIN CALCIUM 10 MG: 10 TABLET, FILM COATED ORAL at 16:22

## 2023-09-09 RX ADMIN — STANDARDIZED SENNA CONCENTRATE 8.6 MG: 8.6 TABLET ORAL at 21:36

## 2023-09-09 RX ADMIN — DULOXETINE HYDROCHLORIDE 60 MG: 60 CAPSULE, DELAYED RELEASE ORAL at 11:36

## 2023-09-09 RX ADMIN — INSULIN DETEMIR 50 UNITS: 100 INJECTION, SOLUTION SUBCUTANEOUS at 23:03

## 2023-09-09 RX ADMIN — METOPROLOL TARTRATE 50 MG: 50 TABLET, FILM COATED ORAL at 11:36

## 2023-09-09 RX ADMIN — CEFTRIAXONE SODIUM 1000 MG: 10 INJECTION, POWDER, FOR SOLUTION INTRAVENOUS at 09:58

## 2023-09-09 RX ADMIN — Medication 250 MG: at 23:03

## 2023-09-09 RX ADMIN — HEPARIN SODIUM 5000 UNITS: 5000 INJECTION INTRAVENOUS; SUBCUTANEOUS at 13:31

## 2023-09-09 RX ADMIN — METOPROLOL TARTRATE 50 MG: 50 TABLET, FILM COATED ORAL at 23:03

## 2023-09-09 RX ADMIN — INSULIN LISPRO 1 UNITS: 100 INJECTION, SOLUTION INTRAVENOUS; SUBCUTANEOUS at 23:04

## 2023-09-09 RX ADMIN — HEPARIN SODIUM 5000 UNITS: 5000 INJECTION INTRAVENOUS; SUBCUTANEOUS at 21:36

## 2023-09-09 RX ADMIN — FERROUS SULFATE TAB 325 MG (65 MG ELEMENTAL FE) 325 MG: 325 (65 FE) TAB at 11:37

## 2023-09-09 RX ADMIN — SODIUM CHLORIDE 75 ML/HR: 0.9 INJECTION, SOLUTION INTRAVENOUS at 11:37

## 2023-09-09 RX ADMIN — INSULIN LISPRO 16 UNITS: 100 INJECTION, SOLUTION INTRAVENOUS; SUBCUTANEOUS at 11:40

## 2023-09-09 RX ADMIN — OMEGA-3 FATTY ACIDS CAP 1000 MG 1000 MG: 1000 CAP at 11:36

## 2023-09-09 RX ADMIN — Medication 250 MG: at 11:36

## 2023-09-09 RX ADMIN — Medication 3 MG: at 21:36

## 2023-09-09 RX ADMIN — PANTOPRAZOLE SODIUM 40 MG: 40 TABLET, DELAYED RELEASE ORAL at 11:36

## 2023-09-09 RX ADMIN — INSULIN LISPRO 25 UNITS: 100 INJECTION, SOLUTION INTRAVENOUS; SUBCUTANEOUS at 16:23

## 2023-09-09 RX ADMIN — PANTOPRAZOLE SODIUM 40 MG: 40 TABLET, DELAYED RELEASE ORAL at 17:18

## 2023-09-09 RX ADMIN — GABAPENTIN 300 MG: 300 CAPSULE ORAL at 21:36

## 2023-09-09 RX ADMIN — SODIUM CHLORIDE 500 ML: 0.9 INJECTION, SOLUTION INTRAVENOUS at 07:10

## 2023-09-09 RX ADMIN — Medication 1 TABLET: at 16:22

## 2023-09-09 NOTE — ASSESSMENT & PLAN NOTE
· Has a history of CKD stage IV.   She is on Demadex 5 mg every other day  · Creatinine this morning 2.59, was 2.07, 3 days ago  · Will hold Demadex, gentle IV hydration  · Continue to monitor, avoid nephrotoxins

## 2023-09-09 NOTE — ED PROVIDER NOTES
History  Chief Complaint   Patient presents with   • Altered Mental Status     Patient was seen in this ED earlier this week for UTI. Was discharged with abx. Patients confusion worsening. 76 yo F coming into the ED for eval of worsening confusion. Ongoing for 2-3 weeks per . She was in the ED 3 days ago on Wednesday, diagnosed with a UTI and started on antibiotics, which she's been taking. Her confusion has only worsened he states. She was about to take 40 units of insulin the other day, which is triple her normal dose, before he stopped her. The patient is a poor history, oriented to person only.  states she has seemed congested, coughing, with yellow phlegm. History provided by:  Patient   used: No    Altered Mental Status  Presenting symptoms: confusion        Prior to Admission Medications   Prescriptions Last Dose Informant Patient Reported? Taking?    B-D ULTRAFINE III SHORT PEN 31G X 8 MM MISC  Self Yes No   Calcium Citrate 1040 MG TABS  Self Yes No   Sig: Take 500 mg by mouth Three times a day   Coenzyme Q10 (CoQ10) 100 MG CAPS  Self Yes No   Sig: Take 100 mg by mouth in the morning   Cranberry 200 MG CAPS  Self Yes No   Sig: Take 200 capsules by mouth 3 (three) times a day   DULoxetine (CYMBALTA) 30 mg delayed release capsule  Self Yes No   Sig: Take 60 mg by mouth daily   Icosapent Ethyl 1 g CAPS  Self Yes No   Sig: Take 1 capsule by mouth 2 (two) times a day   Probiotic Product (PROBIOTIC PO)  Self Yes No   Sig: Take 1 capsule by mouth 2 (two) times a day   acetaminophen (TYLENOL) 325 mg tablet  Self No No   Sig: Take 3 tablets (975 mg total) by mouth every 8 (eight) hours   albuterol (Ventolin HFA) 90 mcg/act inhaler  Self No No   Sig: Inhale 2 puffs every 6 (six) hours as needed for wheezing   amLODIPine (NORVASC) 5 mg tablet  Self No No   Sig: Take 1 tablet (5 mg total) by mouth daily   cefpodoxime (VANTIN) 100 mg tablet   No No   Sig: Take 1 tablet (100 mg total) by mouth 2 (two) times a day for 7 days   estradiol (ESTRACE VAGINAL) 0.1 mg/g vaginal cream  Self No No   Sig: Apply pea sized amount around urethra and inside vaginal opening 3 times weekly   famotidine (PEPCID) 40 MG tablet   No No   Sig: Take 1 tablet (40 mg total) by mouth daily   ferrous sulfate 324 (65 Fe) mg   No No   Sig: Take 1 tablet (324 mg total) by mouth every other day   gabapentin (Neurontin) 300 mg capsule  Self No No   Sig: Take 1 capsule (300 mg total) by mouth daily at bedtime   insulin aspart (NovoLOG) 100 units/mL injection  Self Yes No   Sig: Inject under the skin 3 (three) times a day before meals 16 units, 16 units, 25 units.    insulin detemir (Levemir FlexTouch) 100 Units/mL injection pen  Self Yes No   Sig: Inject 50 Units under the skin every evening    levothyroxine 112 mcg tablet  Self Yes No   Sig: Take 112 mcg by mouth every morning   metoprolol tartrate (LOPRESSOR) 50 mg tablet  Self No No   Sig: Take 1 tablet (50 mg total) by mouth every 12 (twelve) hours   pantoprazole (PROTONIX) 40 mg tablet  Self No No   Sig: TAKE 1 TABLET BY MOUTH TWICE A DAY   pramipexole (MIRAPEX) 0.25 mg tablet  Self No No   Sig: Take 1 tablet (0.25 mg total) by mouth daily at bedtime   rosuvastatin (CRESTOR) 5 mg tablet  Self No No   Sig: Take 1 tablet (5 mg total) by mouth daily   senna (SENOKOT) 8.6 mg   No No   Sig: Take 1 tablet (8.6 mg total) by mouth daily at bedtime   torsemide (DEMADEX) 10 mg tablet   No No   Sig: Take 0.5 tablets (5 mg total) by mouth every other day Take this medication only if your weight exceeds 172 pounds      Facility-Administered Medications: None       Past Medical History:   Diagnosis Date   • Actinic keratosis    • Acute cystitis without hematuria 04/28/2023   • Acute cystitis without hematuria 04/28/2023   • Acute-on-chronic kidney injury [facility])    • ANDRA (acute kidney injury) ([address]) 05/08/2020   • Allergic    • Allergic rhinitis    • Anesthesia     pt reports "had to use double lumen endo tube for hiatal hernia repair/so surgeon could get to where he needed to work"   • Arthritis    • Aspiration into airway    • Ahumada esophagus 1993    Lot of stress with children   • Basal cell carcinoma 2007    left cheek    • BCC (basal cell carcinoma) 05/27/2021    Left Nasal tip   • Breast discharge 06/19/2023   • Breast pain 06/19/2023   • Cancer ([address])     squamous cell cancer on forhead   • Cancer ([address])     basal cell on nose    • Cholelithiasis    • Chronic kidney disease 2000, 2018    Stones, kidney disease stage 4   • Chronic pain disorder     bilat feet and joint pain on occas   • Colon polyp    • Constipation    • COPD (chronic obstructive pulmonary disease) ([address])    • COVID-19 08/2021    recovered at home/did receive monoclonal infusion   • COVID-19 virus infection 08/16/2021   • Dental bridge present    • Depression    • Diabetes mellitus ([address])     Type 2   • Diabetic neuropathy ([address])    • Disease of thyroid gland    • Dyspnea    • Elevated liver enzymes 04/04/2023   • Epigastric abdominal pain with severe diabetic gastroparesis, status post EGD/Botox injection, 5/14 05/17/2021   • Facial abrasion, initial encounter 03/22/2023   • Fall 03/22/2023   • Family history of thyroid problem    • Fatty liver    • Fracture of orbital floor, blow-out, right, closed, with routine healing, subsequent encounter 03/22/2023   • GERD (gastroesophageal reflux disease)    • Heart burn    • Hiatal hernia    • History of colon polyps 07/30/2021   • History of kidney stones 09/29/2020    Hx of recurrent kidney stones   • History of kidney stones 09/29/2020    Hx of recurrent kidney stones  Formatting of this note might be different from the original. Hx of recurrent kidney stones  Last Assessment & Plan:  Formatting of this note might be different from the original. We will set her up for follow-up in 3 months with a KUB prior. She knows to call if she has any kidney stone type pain in the meantime. • History of pneumonia    • History of repair of hiatal hernia 05/03/2020   • Hyperlipidemia    • Hyperplasia, parathyroid ([address])    • Hypertension    • Hypotension 04/13/2022   • Kidney problem    • Kidney stone    • Memory loss Julu2 2020   • Motion sickness    • Motion sickness    • Multiple closed fractures of ribs of right side 03/22/2023   • Nasal bones, closed fracture 03/22/2023   • Neck pain    • Obesity 1978   • Obesity (BMI 30.0-34. 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, left     great toe fusion   • Urinary incontinence    • Urinary tract infection 03/28/2022   • Wears glasses        Past Surgical History:   Procedure Laterality Date   • ABDOMINAL SURGERY  [id number]    Nissen x2 1972 tubal ligarion   • ARTHROSCOPY KNEE     • BREAST BIOPSY      stereotactic-benign   • BREAST BIOPSY      stereo-benign   • BREAST CYST EXCISION Bilateral     benign   • BREAST EXCISIONAL BIOPSY      unknown date-benign   • BREAST EXCISIONAL BIOPSY      unknown date-benign   • BREAST EXCISIONAL BIOPSY      unknown date-benign   • BREAST EXCISIONAL BIOPSY      unknown age-benign   • CARDIAC CATHETERIZATION     • CHOLECYSTECTOMY     • COLONOSCOPY     • EXAMINATION UNDER ANESTHESIA N/A 06/24/2021    Procedure: EXAM UNDER ANESTHESIA (EUA), DISE;  Surgeon: Shayna Childs MD;  Location: [facility] MAIN OR;  Service: ENT   • HERNIA REPAIR     • HIATAL HERNIA REPAIR     • KNEE SURGERY      Torn maniscus lap surg   • LIPOSUCTION     • LYMPH NODE BIOPSY     • MOHS SURGERY  05/20/2021    left mid forehead-Evan   • MOHS SURGERY Left 05/27/2021    Left nasal tip- evan    • NY LIGATION/BIOPSY TEMPORAL ARTERY Left 01/05/2023    Procedure: BIOPSY ARTERY TEMPORAL;  Surgeon: Akil Marrero DO;  Location: [facility] Main OR;  Service: Vascular   • AK OPEN IMPLANTATION CRANIAL NERVE VASQUEZ & PULSE GEN N/A 11/10/2021    Procedure: INSERTION UPPER AIRWAY STIMULATOR, INSPIRE IMPLANT;  Surgeon: Charity Lawrence MD;  Location: [facility] Main OR;  Service: ENT   • AK UNLISTED PROCEDURE FOOT/TOES Right 07/19/2022    Procedure: 1st metatarsal phalangeal joint fusion;  Surgeon: Pippa Mitchell DPM;  Location[facility];  Service: Podiatry   • REDUCTION MAMMAPLASTY Bilateral 2000   • REDUCTION MAMMAPLASTY     • SKIN BIOPSY     • SKIN CANCER EXCISION  2007    squamous cell carcinoma    • SKIN CANCER EXCISION  2007    basal cell carcinoma   • SKIN CANCER EXCISION  2023    SCCIS chest   • SQUAMOUS CELL CARCINOMA EXCISION     • TOE SURGERY Right 08/04/2022    Right Great Toe Fusion   • TONSILLECTOMY     • TUBAL LIGATION     • UPPER GASTROINTESTINAL ENDOSCOPY     • US GUIDED BREAST BIOPSY RIGHT COMPLETE Right 07/18/2022       Family History   Problem Relation Age of Onset   • Heart disease Mother    • Depression Mother    • Hypertension Mother    • COPD Mother    • Hearing loss Mother    • Anxiety disorder Mother    • Heart disease Father    • Lung cancer Father 79        Smoker    • Cancer Father         brain   • Alcohol abuse Father    • Dementia Father    • Hypertension Father    • Thyroid disease Father    • COPD Father    • Arthritis Father    • Brain cancer Father 76   • Hypertension Sister    • Diabetes Sister    • Heart disease Sister    • Thyroid disease Sister    • Cancer Sister         Lympoma, lung   • Lung cancer Sister 78   • Anxiety disorder Sister    • Hypertension Brother    • Diabetes Brother    • Cancer Brother         Throat   • Dementia Brother    • Stroke Brother    • Hypertension Brother    • Heart disease Brother    • Diabetes Brother    • Hypertension Brother    • No Known Problems Son    • No Known Problems Son    • Brain cancer Paternal Aunt         unknown age   • Diabetes Sister    • Hypertension Sister    • Diabetes Brother    • Breast cancer Neg Hx      I have reviewed and agree with the history as documented. E-Cigarette/Vaping   • E-Cigarette Use Never User      E-Cigarette/Vaping Substances   • Nicotine No    • THC No    • CBD No    • Flavoring No    • Other No    • Unknown No      Social History     Tobacco Use   • Smoking status: Former     Packs/day: 2.00     Years: 10.00     Total pack years: 20.00     Types: Cigarettes     Start date: 1968     Quit date: 1976     Years since quittin.7     Passive exposure: Past   • Smokeless tobacco: Never   • Tobacco comments:     Smoked 2 pack a day   Vaping Use   • Vaping Use: Never used   Substance Use Topics   • Alcohol use: Yes     Comment: 1 or 2 a year   • Drug use: No       Review of Systems   Respiratory: Positive for cough. Psychiatric/Behavioral: Positive for confusion. All other systems reviewed and are negative. Physical Exam  Physical Exam  Vitals and nursing note reviewed. Constitutional:       Appearance: Normal appearance. She is well-developed and normal weight. HENT:      Head: Normocephalic and atraumatic. Right Ear: External ear normal.      Left Ear: External ear normal.      Nose: Nose normal.      Mouth/Throat:      Mouth: Mucous membranes are moist.      Pharynx: Oropharynx is clear. Eyes:      General: No scleral icterus. Conjunctiva/sclera: Conjunctivae normal.   Cardiovascular:      Rate and Rhythm: Normal rate and regular rhythm. Pulses: Normal pulses. Heart sounds: Normal heart sounds. Pulmonary:      Effort: Pulmonary effort is normal.      Breath sounds: Normal breath sounds. Abdominal:      General: Abdomen is flat. There is no distension. Palpations: Abdomen is soft. Tenderness: There is no abdominal tenderness.    Musculoskeletal:         General: Normal range of motion. Cervical back: Normal range of motion. Skin:     General: Skin is warm and dry. Capillary Refill: Capillary refill takes less than 2 seconds. Coloration: Skin is not jaundiced. Neurological:      General: No focal deficit present. Mental Status: She is alert. She is confused. GCS: GCS eye subscore is 4. GCS verbal subscore is 4. GCS motor subscore is 6. Cranial Nerves: No cranial nerve deficit or dysarthria. Sensory: No sensory deficit. Motor: No weakness. Coordination: Coordination normal.      Comments: aaox1. Psychiatric:         Mood and Affect: Mood is depressed. Mood is not anxious. Behavior: Behavior normal. Behavior is not agitated. Cognition and Memory: Cognition is impaired. Memory is impaired.          Vital Signs  ED Triage Vitals   Temperature Pulse Respirations Blood Pressure SpO2   09/09/23 0621 09/09/23 0621 09/09/23 0621 09/09/23 0621 09/09/23 0621   99.6 °F (37.6 °C) 69 16 (!) 179/84 95 %      Temp Source Heart Rate Source Patient Position - Orthostatic VS BP Location FiO2 (%)   09/09/23 0621 09/09/23 0621 09/09/23 0621 09/09/23 0621 --   Oral Monitor Sitting Right arm       Pain Score       09/09/23 1038       No Pain           Vitals:    09/09/23 0900 09/09/23 0930 09/09/23 1000 09/09/23 1038   BP: (!) 183/88 (!) 179/85 166/78 141/90   Pulse: 68 69 67 63   Patient Position - Orthostatic VS:             Visual Acuity      ED Medications  Medications   acetaminophen (TYLENOL) tablet 975 mg (has no administration in time range)   amLODIPine (NORVASC) tablet 5 mg (5 mg Oral Given 9/9/23 1136)   calcium carbonate (OYSTER SHELL,OSCAL) 500 mg tablet 1 tablet (1 tablet Oral Given 9/9/23 1137)   DULoxetine (CYMBALTA) delayed release capsule 60 mg (60 mg Oral Given 9/9/23 1136)   famotidine (PEPCID) tablet 10 mg (10 mg Oral Given 9/9/23 1136)   gabapentin (NEURONTIN) capsule 300 mg (has no administration in time range)   insulin lispro (HumaLOG) 100 units/mL subcutaneous injection 16 Units (16 Units Subcutaneous Given 9/9/23 1140)   insulin lispro (HumaLOG) 100 units/mL subcutaneous injection 25 Units (has no administration in time range)   insulin detemir (LEVEMIR FLEXTOUCH) 100 Units/mL subcutaneous injection pen 50 Units (has no administration in time range)   levothyroxine tablet 112 mcg (has no administration in time range)   metoprolol tartrate (LOPRESSOR) tablet 50 mg (50 mg Oral Given 9/9/23 1136)   pramipexole (MIRAPEX) tablet 0.25 mg (has no administration in time range)   saccharomyces boulardii (FLORASTOR) capsule 250 mg (250 mg Oral Given 9/9/23 1136)   atorvastatin (LIPITOR) tablet 10 mg (has no administration in time range)   senna (SENOKOT) tablet 8.6 mg (has no administration in time range)   albuterol (PROVENTIL HFA,VENTOLIN HFA) inhaler 2 puff (has no administration in time range)   estradiol (ESTRACE) vaginal cream 1 g (has no administration in time range)   pantoprazole (PROTONIX) EC tablet 40 mg (40 mg Oral Given 9/9/23 1136)   sodium chloride 0.9 % infusion (75 mL/hr Intravenous New Bag 9/9/23 1137)   heparin (porcine) subcutaneous injection 5,000 Units (5,000 Units Subcutaneous Given 9/9/23 1331)   melatonin tablet 3 mg (has no administration in time range)   risperiDONE (RisperDAL) tablet 0.25 mg (has no administration in time range)   insulin lispro (HumaLOG) 100 units/mL subcutaneous injection 1-5 Units (2 Units Subcutaneous Given 9/9/23 1137)   insulin lispro (HumaLOG) 100 units/mL subcutaneous injection 1-5 Units (has no administration in time range)   ferrous sulfate tablet 325 mg (325 mg Oral Given 9/9/23 1137)   fish oil capsule 1,000 mg (1,000 mg Oral Given 9/9/23 1136)   sodium chloride 0.9 % bolus 500 mL (500 mL Intravenous New Bag 9/9/23 6110)   ceftriaxone (ROCEPHIN) 1 g/50 mL in dextrose IVPB (1,000 mg Intravenous New Bag 9/9/23 5476)       Diagnostic Studies  Results Reviewed     Procedure Component Value Units Date/Time    Urine Microscopic [[id number]]  (Abnormal) Collected: 09/09/23 0810    Lab Status: Final result Specimen: Urine, Straight Cath Updated: 09/09/23 0906     RBC, UA 1-2 /hpf      WBC, UA None Seen /hpf      Epithelial Cells Occasional /hpf      Bacteria, UA None Seen /hpf      MUCUS THREADS Occasional     Hyaline Casts, UA 0-3 /lpf     UA w Reflex to Microscopic w Reflex to Culture [[id number]]  (Abnormal) Collected: 09/09/23 0810    Lab Status: Final result Specimen: Urine, Straight Cath Updated: 09/09/23 0845     Color, UA Light Yellow     Clarity, UA Clear     Specific Gravity, UA 1.017     pH, UA 6.5     Leukocytes, UA Negative     Nitrite, UA Negative     Protein,  (3+) mg/dl      Glucose, UA 70 (7/100%) mg/dl      Ketones, UA Negative mg/dl      Urobilinogen, UA <2.0 mg/dl      Bilirubin, UA Negative     Occult Blood, UA Negative    FLU/RSV/COVID - if FLU/RSV clinically relevant [[id number]]  (Normal) Collected: 09/09/23 0706    Lab Status: Final result Specimen: Nares from Nose Updated: 09/09/23 0801     SARS-CoV-2 Negative     INFLUENZA A PCR Negative     INFLUENZA B PCR Negative     RSV PCR Negative    Narrative:      FOR PEDIATRIC PATIENTS - copy/paste COVID Guidelines URL to browser: [email or web address]    SARS-CoV-2 assay is a Nucleic Acid Amplification assay intended for the  qualitative detection of nucleic acid from SARS-CoV-2 in nasopharyngeal  swabs. Results are for the presumptive identification of SARS-CoV-2 RNA. Positive results are indicative of infection with SARS-CoV-2, the virus  causing COVID-19, but do not rule out bacterial infection or co-infection  with other viruses. Laboratories within the [facility] and its  territories are required to report all positive results to the appropriate  public health authorities.  Negative results do not preclude SARS-CoV-2  infection and should not be used as the sole basis for treatment or other  patient management decisions. Negative results must be combined with  clinical observations, patient history, and epidemiological information. This test has not been FDA cleared or approved. This test has been authorized by FDA under an Emergency Use Authorization  (EUA). This test is only authorized for the duration of time the  declaration that circumstances exist justifying the authorization of the  emergency use of an in vitro diagnostic tests for detection of SARS-CoV-2  virus and/or diagnosis of COVID-19 infection under section 564(b)(1) of  the Act, 21 U. S.C. 692GYV-9(E)(7), unless the authorization is terminated  or revoked sooner. The test has been validated but independent review by FDA  and CLIA is pending. Test performed using [employer]: This RT-PCR assay targets N2,  a region unique to SARS-CoV-2. A conserved region in the E-gene was chosen  for pan-Sarbecovirus detection which includes SARS-CoV-2. According to CMS-2020-01-R, this platform meets the definition of high-throughput technology.     Procalcitonin [[id number]]  (Normal) Collected: 09/09/23 0706    Lab Status: Final result Specimen: Blood from Arm, Right Updated: 09/09/23 0742     Procalcitonin 0.09 ng/ml     HS Troponin 0hr (reflex protocol) [[id number]]  (Normal) Collected: 09/09/23 0706    Lab Status: Final result Specimen: Blood from Arm, Right Updated: 09/09/23 0739     hs TnI 0hr 6 ng/L     Comprehensive metabolic panel [[id number]]  (Abnormal) Collected: 09/09/23 0706    Lab Status: Final result Specimen: Blood from Arm, Right Updated: 09/09/23 0732     Sodium 137 mmol/L      Potassium 4.6 mmol/L      Chloride 101 mmol/L      CO2 27 mmol/L      ANION GAP 9 mmol/L      BUN 26 mg/dL      Creatinine 2.59 mg/dL      Glucose 195 mg/dL      Calcium 8.9 mg/dL      AST 16 U/L      ALT 20 U/L      Alkaline Phosphatase 65 U/L      Total Protein 6.0 g/dL      Albumin 3.9 g/dL      Total Bilirubin 0.35 mg/dL      eGFR 17 ml/min/1.73sq m     Narrative:      [facility] guidelines for Chronic Kidney Disease (CKD):   •  Stage 1 with normal or high GFR (GFR > 90 mL/min/1.73 square meters)  •  Stage 2 Mild CKD (GFR = 60-89 mL/min/1.73 square meters)  •  Stage 3A Moderate CKD (GFR = 45-59 mL/min/1.73 square meters)  •  Stage 3B Moderate CKD (GFR = 30-44 mL/min/1.73 square meters)  •  Stage 4 Severe CKD (GFR = 15-29 mL/min/1.73 square meters)  •  Stage 5 End Stage CKD (GFR <15 mL/min/1.73 square meters)  Note: GFR calculation is accurate only with a steady state creatinine    CK [[id number]]  (Normal) Collected: 09/09/23 0706    Lab Status: Final result Specimen: Blood from Arm, Right Updated: 09/09/23 0732     Total CK 66 U/L     Salicylate level [[id number]]  (Normal) Collected: 09/09/23 0706    Lab Status: Final result Specimen: Blood from Arm, Right Updated: 05/54/57 0669     Salicylate Lvl <5 mg/dL     Acetaminophen level-If concentration is detectable, please discuss with medical  on call. [[id number]]  (Abnormal) Collected: 09/09/23 0706    Lab Status: Final result Specimen: Blood from Arm, Right Updated: 09/09/23 0732     Acetaminophen Level <10 ug/mL     Lactic acid, plasma (w/reflex if result > 2.0) [[id number]]  (Normal) Collected: 09/09/23 0706    Lab Status: Final result Specimen: Blood from Arm, Right Updated: 09/09/23 0732     LACTIC ACID 1.8 mmol/L     Narrative:      Result may be elevated if tourniquet was used during collection.     Magnesium [[id number]]  (Abnormal) Collected: 09/09/23 0706    Lab Status: Final result Specimen: Blood from Arm, Right Updated: 09/09/23 0732     Magnesium 1.8 mg/dL     Ethanol [[id number]]  (Normal) Collected: 09/09/23 0706    Lab Status: Final result Specimen: Blood from Arm, Right Updated: 09/09/23 0731     Ethanol Lvl <10 mg/dL     CBC and differential [[id number]]  (Abnormal) Collected: 09/09/23 0706    Lab Status: Final result Specimen: Blood from Arm, Right Updated: 09/09/23 0720     WBC 9.78 Thousand/uL      RBC 4.15 Million/uL      Hemoglobin 11.7 g/dL      Hematocrit 37.1 %      MCV 89 fL      MCH 28.2 pg      MCHC 31.5 g/dL      RDW 16.9 %      MPV 10.5 fL      Platelets 270 Thousands/uL      nRBC 0 /100 WBCs      Neutrophils Relative 59 %      Immat GRANS % 1 %      Lymphocytes Relative 30 %      Monocytes Relative 7 %      Eosinophils Relative 2 %      Basophils Relative 1 %      Neutrophils Absolute 5.87 Thousands/µL      Immature Grans Absolute 0.05 Thousand/uL      Lymphocytes Absolute 2.89 Thousands/µL      Monocytes Absolute 0.69 Thousand/µL      Eosinophils Absolute 0.23 Thousand/µL      Basophils Absolute 0.05 Thousands/µL     Blood culture #1 [[id number]] Collected: 09/09/23 0709    Lab Status: In process Specimen: Blood from Arm, Left Updated: 09/09/23 0714    Blood culture #2 [[id number]] Collected: 09/09/23 0707    Lab Status: In process Specimen: Blood from Arm, Right Updated: 09/09/23 0713                 XR chest 1 view portable   ED Interpretation by Willard Kayser, DO (09/09 2966)   No acute abnormalities                 Procedures  Procedures         ED Course  ED Course as of 09/09/23 1454   Sat Sep 09, 2023   0956 Creatinine(!): 2.59   0956 + ANDRA. D/w SLIM, Dr Dov Blanco, accepts inpatient                                             Medical Decision Making  76 yo female coming in with generalized weakness, was treated for recent UTI, poor oral intake, fluids and food recently. Worsening confusion over the past several weeks as well. No history of dementia. ED work-up for gradual onset of altered mental status, already had a CT scan of head several days ago which was negative. Labs, urinalysis, consistent with acute kidney injury on chronic kidney disease. IV fluids given. Case discussed with hospitalist, given worsening confusion and ANDRA, recommend admission to hospital.  Dr. Dov Blanco accepts to service, inpatient.     ANDRA (acute kidney injury) [facility]): acute illness or injury  Altered mental status: acute illness or injury  Dehydration: acute illness or injury  UTI (urinary tract infection): acute illness or injury  Amount and/or Complexity of Data Reviewed  Labs: ordered. Decision-making details documented in ED Course. Radiology: ordered and independent interpretation performed. Risk  Decision regarding hospitalization. Disposition  Final diagnoses: Altered mental status   ANDRA (acute kidney injury) ([address])   Dehydration   UTI (urinary tract infection)     Time reflects when diagnosis was documented in both MDM as applicable and the Disposition within this note     Time User Action Codes Description Comment    9/9/2023  9:44 AM Santillan Garcia Add [R41.82] Altered mental status     9/9/2023  9:44 AM Diamond Bronson Add [N17.9] ANDRA (acute kidney injury) ([address])     9/9/2023  9:44 AM Santillan Garcia Add [E86.0] Dehydration     9/9/2023  9:44 AM Diamond Mesa Add [N39.0] UTI (urinary tract infection)       ED Disposition     ED Disposition   Admit    Condition   Stable    Date/Time   Sat Sep 9, 2023  9:44 AM    Comment   Case was discussed with Dr Opal Guerrero and the patient's admission status was agreed to be Admission Status: inpatient status to the service of Dr. Opal Guerrero . Follow-up Information    None         Current Discharge Medication List      CONTINUE these medications which have NOT CHANGED    Details   acetaminophen (TYLENOL) 325 mg tablet Take 3 tablets (975 mg total) by mouth every 8 (eight) hours  Refills: 0    Associated Diagnoses: Multiple closed fractures of ribs of right side      albuterol (Ventolin HFA) 90 mcg/act inhaler Inhale 2 puffs every 6 (six) hours as needed for wheezing  Qty: 54 g, Refills: 0    Comments: Substitution to a formulary equivalent within the same pharmaceutical class is authorized.   Associated Diagnoses: Asthma, unspecified asthma severity, unspecified whether complicated, unspecified whether persistent      amLODIPine (NORVASC) 5 mg tablet Take 1 tablet (5 mg total) by mouth daily  Qty: 90 tablet, Refills: 1    Associated Diagnoses: Essential hypertension      B-D ULTRAFINE III SHORT PEN 31G X 8 MM MISC Refills: 3      Calcium Citrate 1040 MG TABS Take 500 mg by mouth Three times a day      cefpodoxime (VANTIN) 100 mg tablet Take 1 tablet (100 mg total) by mouth 2 (two) times a day for 7 days  Qty: 14 tablet, Refills: 0    Associated Diagnoses: UTI (urinary tract infection)      Coenzyme Q10 (CoQ10) 100 MG CAPS Take 100 mg by mouth in the morning      Cranberry 200 MG CAPS Take 200 capsules by mouth 3 (three) times a day      DULoxetine (CYMBALTA) 30 mg delayed release capsule Take 60 mg by mouth daily      estradiol (ESTRACE VAGINAL) 0.1 mg/g vaginal cream Apply pea sized amount around urethra and inside vaginal opening 3 times weekly  Qty: 42.5 g, Refills: 2    Associated Diagnoses: Recurrent UTI      famotidine (PEPCID) 40 MG tablet Take 1 tablet (40 mg total) by mouth daily  Qty: 90 tablet, Refills: 0    Associated Diagnoses: Gastroesophageal reflux disease without esophagitis      ferrous sulfate 324 (65 Fe) mg Take 1 tablet (324 mg total) by mouth every other day  Qty: 45 tablet, Refills: 0    Associated Diagnoses: Anemia due to stage 4 chronic kidney disease (HCC)      gabapentin (Neurontin) 300 mg capsule Take 1 capsule (300 mg total) by mouth daily at bedtime  Qty: 90 capsule, Refills: 0    Associated Diagnoses: Herpes zoster without complication      Icosapent Ethyl 1 g CAPS Take 1 capsule by mouth 2 (two) times a day      insulin aspart (NovoLOG) 100 units/mL injection Inject under the skin 3 (three) times a day before meals 16 units, 16 units, 25 units.       insulin detemir (Levemir FlexTouch) 100 Units/mL injection pen Inject 50 Units under the skin every evening       levothyroxine 112 mcg tablet Take 112 mcg by mouth every morning      metoprolol tartrate (LOPRESSOR) 50 mg tablet Take 1 tablet (50 mg total) by mouth every 12 (twelve) hours  Qty: 180 tablet, Refills: 3    Associated Diagnoses: Essential hypertension      pantoprazole (PROTONIX) 40 mg tablet TAKE 1 TABLET BY MOUTH TWICE A DAY  Qty: 180 tablet, Refills: 2    Associated Diagnoses: Gastroesophageal reflux disease, unspecified whether esophagitis present      pramipexole (MIRAPEX) 0.25 mg tablet Take 1 tablet (0.25 mg total) by mouth daily at bedtime  Qty: 90 tablet, Refills: 2    Associated Diagnoses: RLS (restless legs syndrome)      Probiotic Product (PROBIOTIC PO) Take 1 capsule by mouth 2 (two) times a day      rosuvastatin (CRESTOR) 5 mg tablet Take 1 tablet (5 mg total) by mouth daily  Qty: 90 tablet, Refills: 3    Associated Diagnoses: Stage 4 chronic kidney disease ([address]); Elevated cholesterol; Vitamin D deficiency      senna (SENOKOT) 8.6 mg Take 1 tablet (8.6 mg total) by mouth daily at bedtime  Qty: 90 tablet, Refills: 0    Associated Diagnoses: Anemia due to stage 4 chronic kidney disease (HCC)      torsemide (DEMADEX) 10 mg tablet Take 0.5 tablets (5 mg total) by mouth every other day Take this medication only if your weight exceeds 172 pounds  Qty: 30 tablet, Refills: 0    Associated Diagnoses: Essential hypertension             No discharge procedures on file.     PDMP Review       Value Time User    PDMP Reviewed  Yes 3/24/2023  9:59 PM Carrie Clements MD          ED Provider  Electronically Signed by           Marcellus Hazel DO  09/09/23 [id number]

## 2023-09-09 NOTE — PROGRESS NOTES
RENAL FOLLOW UP NOTE:.td    ASSESSMENT AND PLAN:  1.  CKD stage 4.:  Etiology:  Diabetic nephropathy/hypertensive nephrosclerosis/arteriolar nephrosclerosis/chronic NSAID use.  No evidence of obstructive uropathy, renal artery disease or primary glomerular disease with a bland urinalysis  Of note, CPK was normal at 105 no evidence rhabdomyolysis. Baseline creatinine: 1.5-2.0  Current creatinine: 2.06 following acute kidney injury.   We will recheck at this time  Urine protein creatinine ratio: 2.12 g above goal but positive UTI which is simply repeat   UA demonstrated no significant hematuria but 3+ proteinuria from  Serologies:  Normal C3/C4; negative rheumatoid factor; negative SPEP:  Negative UPEP;  Light chain ratio 2.09 essentially normal for chronic kidney disease  negative EWELINA; negative hepatitis-C; normal IgA; normal ASO; negative RPR  Recommendations:  Treat hypertension-please see below  Treat dyslipidemia-please see below  Maintain proteinuria less than 1 g or as low as possible: Adjust antihypertensive regimen in order to try to get to proteinuria less than 1 g please see below  Avoid nephrotoxic agents such as NSAIDs, patient counseled as such  S/p kidney smart  Recheck labs including urine protein creatinine ratio  ACP meeting may have to be postponed with the question of cognitive decline/dementia     2.  Volume:   Current status:  Euvolemic  Torsemide dose:  Continue current dose 20 mg daily  3.  Hypertension:  Workup:  saline suppression test for primary aldosterone state was normal  plasma free metanephrines was negative  renal artery duplex was negative 02/2019       Current blood pressure averages:   No readings today with recent hospitalizations        Goal blood pressure:  less than 125/75 given less than 1 g of proteinuria at this time  Recommendations:  Push nonmedical regimen including weight loss, isotonic exercise and avoidance of salt; patient counseled as such  Medication changes today:   She will send in a week of blood pressure readings potentially add back ARB for proteinuria  4.  Electrolytes:  all acceptable including a potassium of 3.9  5.  Mineral bone disorder:  Calcium/magnesium/phosphorus:  All acceptable,   PTH intact:   110.7 just monitor for now  Vitamin-D:  40.8  6.  Dyslipidemia:  Goal LDL:  Less than 100  Current lipid profile: LDL 87/  Recommendations:  Per Endocrinology but essentially at goal  7.  Anemia:   Current hemoglobin 11.2 stablel  Check iron studies patient followed closely by GI as well  8.  Other problems:  Depression  Diabetes mellitus per primary medical physician: I would avoid SGLT2 inhibitors at the moment with frequent urinary tract infections  Hypothyroidism  BARBARA on CPAP  Fibromyalgia/osteoarthritis:  Patient with myoclonic movements, seems related to gabapentin it was adjusted with resolution.   Nephrolithiasis  Basal cell/squamous CA of the skin  Urinary stress incontinence  Status post incisional hernia repairs  hospitalization as outlined below from severe GERD with hypoxemia and shortness of breath from a failed Nissen fundoplication from prior hiatal hernia repair.  Patient is due to have further testing with an EGD by GI and then subsequently thoracic surgery consultation for possible repair(however, according to the patient at this juncture she was felt I risk so no surgery is planned at this time)  In addition, she was placed on Protonix 40 b.i.d. and Pepcid 40 mg hs  Hospitalization on 07/14/2020 secondary confusion at home.  Apparently she had protracted hospital course in [place] following aspiration pneumonia.  Ten days in the ICU on a ventilator.  No overt infectious process.  Low probability for pulmonary embolus despite an elevated D-dimer.  Had mild leukocytosis/SIRS criteria subsequently discharged 1 day later when she clinically improved.  Symptoms were felt secondary to her protracted ICU course.   hospitalization with discharge on 05/18/21:  Epigastric pain following an EGD on 05/13/2021 for Botox injection at the pylorus for treatment for gastroparesis.  Apparently associated with chocolate ingestion and possible cough with aspiration  Complicated by ANDRA with creatinine to 2.6 which improved with IV fluids TO 1.92 ON 05/18/2021   Hospitalization 04/13/2022:  With hypotension fatigue noted to have a low blood pressure mild increasing creatinine, dysuria treated with Bactrim but had an allergic reaction to it as an outpatient she received intravenous ceftriaxone and she was discharged on doxycycline. Recently placed on spironolactone for blood pressure all medications were held decide from metoprolol.  Amlodipine was resumed, torsemide re-initiated, but spironolactone was stopped.  Maintain off hydralazine possibly add olmesartan  Creatinine initially as high as 2.27 reduce down to 2.16 upon discharge  Patient admitted 1/7/2023 secondary headache and confusion left temporal symptoms and temporal artery tenderness with an elevated ESR started empirically on steroids per neurology recommendation status post temporal artery biopsy. Roman Furman she developed a vesicular rash felt to have herpes zoster ophthalmologists started on Valtrex seen by ophthalmology eyedrops also initiated.  Patient discharged on Valtrex and high-dose gabapentin with follow-up with her primary care physician. Patient with leukocytosis most compatible with steroids  Admitted 8/19/2023 after recent UTI history of urinary retention and straight catheterizes herself: Admitted with ANDRA from poor oral intake UTI with Serratia given IV fluids placed on ertapenem/change in mental status noted. Urinary straight catheterizations recommended to be increased by urology. Torsemide 5 mg every other day. Admitted 9/9 with change in mental status status post recent discharge earlier in September with UTI.   Seen by geriatrics recommended formal cognitive testing as an outpatient. ([place] multifactorial from chronic medical conditions? Underlying dementia): Patient's course complicated by acute kidney injury with a creatinine of 2.5 on admission diuretics were held gentle IV fluids creatinine back to baseline 2.07  Admitted 9/12/2023: COPD exacerbation treated with steroids nebulizers and oxygen.           GI health maintenance:  Last colonoscopy: 7/30/2021    PATIENT INSTRUCTIONS:    Patient Instructions   Visit summary:  -Overall kidney function appears back to baseline after the acute events in the hospital  - We are going to be rechecking lab work at this time  - We are also going to be checking home blood pressure readings to see if we need to make any adjustments    We will set up an advance care plan meeting for you to put your desires and wishes in the computer system so that we honor and respect your wishes        1. Medication changes today:  None pending follow-up labs and home blood pressure readings    We will set you up for an advance care plan meeting at your convenience    2. Please go for  fasting  lab work over the next week or so    3.   Please take 1 week a blood pressure readings at this time just sitting would be great    AS FOLLOWS  MORNING AND EVENING, SITTING  as follows:  TAKE THE MORNING READINGS BEFORE ANY MEDICATIONS AND WHEN YOU ARE RELAXED FOR SEVERAL MINUTES  TAKE THE EVENING READINGS:  BETWEEN 7-10 P.M.; PRIOR TO ANY MEDICATIONS; AT LEAST IN OUR  FROM DINNER; AND CERTAINLY AFTER RELAXING FOR A FEW MINUTES  PLEASE INCLUDE HEART RATE WITH YOUR BLOOD PRESSURE READINGS  When taking standing readings, keep your arm supported at heart level and not dangling  Make sure you are sitting with your back supported and feet on the ground and do not cross your legs or feet  Make sure you have not taken any coffee or caffeine products or exercised or smoke cigarettes at least 30 minutes before taking your blood pressure  Then please mail these readings into the office    4. Please go for lab work nonfasting in 1 month    5. Follow-up in 3-4 months  Please bring in 1 week a blood pressure readings morning evening, sitting and standing is outlined above  PLEASE BRING AN YOUR BLOOD PRESSURE MACHINE TO CORRELATE WITH THE OFFICE MACHINE AT THIS NEXT SCHEDULED VISIT  Please go for fasting lab work 1-2 weeks prior to your appointment      6. General instructions:  AVOID SALT BUT NOT ADDING AN READING LABELS TO MAKE SURE THERE IS LOW-SALT IN THE FOOD THAT YOU ARE EATING  Goal is less than 2 g of sodium intake or less than 5 g of sodium chloride intake per day    Avoid nonsteroidal anti-inflammatory drugs such as Naprosyn, ibuprofen, Aleve, Advil, Celebrex, Meloxicam (Mobic) etc.  You can use Tylenol as needed if you do not have any liver condition to be concerned about    Avoid medications such as Sudafed or decongestants and antihistamines that contained the D component which is the decongestant. You can take antihistamines without the decongestant or D component. Try to avoid medications such as pantoprazole or  Protonix/Nexium or Esomeprazole)/Prilosec or omeprazole/Prevacid or lansoprazole/AcipHex or Rabeprazole. If you are able to, use Pepcid as this is safer for your kidneys. Try to exercise at least 30 minutes 3 days a week to begin with with an ultimate goal of 5 days a week for at least 30 minutes    Try to lose 5-10 lb by your next visit    Please do not drink more than 2 glasses of alcohol/wine on a daily basis as this may contribute to your high blood pressure.             Subjective:     Multiple hospital admissions   No fevers, chills, recent COPD exacerbation cough improving  Fair appetite and poor energy  No hematuria, dysuria, voiding symptoms or foamy urine  No gastrointestinal symptoms except for occasional loose stools  No chest pain, some shortness of breath with COPD exacerbation unchanged, no leg swelling  Occasional headaches, slight dizziness or lightheadedness upon standing  Blood pressure medications:   Torsemide 5 mg only as needed for weight over 172 pounds and has not been taking it  Metoprolol tartrate 50 mg twice a day  Amlodipine 5 mg daily in the morning      ROS:  See HPI, otherwise review of systems as completely reviewed with the patient are negative    Past Medical History:   Diagnosis Date   • Actinic keratosis    • Acute cystitis without hematuria 04/28/2023   • Acute cystitis without hematuria 04/28/2023   • Acute-on-chronic kidney injury [facility])    • ANDRA (acute kidney injury) ([address]) 05/08/2020   • Allergic    • Allergic rhinitis    • Anesthesia     pt reports "had to use double lumen endo tube for hiatal hernia repair/so surgeon could get to where he needed to work"   • Arthritis    • Aspiration into airway    • Ahumada esophagus 1993    Lot of stress with children   • Basal cell carcinoma 2007    left cheek    • BCC (basal cell carcinoma) 05/27/2021    Left Nasal tip   • Breast discharge 06/19/2023   • Breast pain 06/19/2023   • Cancer ([address])     squamous cell cancer on forhead   • Cancer ([address])     basal cell on nose    • Cholelithiasis    • Chronic kidney disease 2000, 2018    Stones, kidney disease stage 4   • Chronic pain disorder     bilat feet and joint pain on occas   • Colon polyp    • Constipation    • COPD (chronic obstructive pulmonary disease) ([address])    • COVID-19 08/2021    recovered at home/did receive monoclonal infusion   • COVID-19 virus infection 08/16/2021   • Dental bridge present    • Depression    • Diabetes mellitus ([address])     Type 2   • Diabetic neuropathy ([address])    • Disease of thyroid gland    • Dyspnea    • Elevated liver enzymes 04/04/2023   • Epigastric abdominal pain with severe diabetic gastroparesis, status post EGD/Botox injection, 5/14 05/17/2021   • Facial abrasion, initial encounter 03/22/2023   • Fall 03/22/2023   • Family history of thyroid problem    • Fatty liver    • Fracture of orbital floor, blow-out, right, closed, with routine healing, subsequent encounter 03/22/2023   • GERD (gastroesophageal reflux disease)    • Heart burn    • Hiatal hernia    • History of colon polyps 07/30/2021   • History of kidney stones 09/29/2020    Hx of recurrent kidney stones   • History of kidney stones 09/29/2020    Hx of recurrent kidney stones  Formatting of this note might be different from the original. Hx of recurrent kidney stones  Last Assessment & Plan:  Formatting of this note might be different from the original. We will set her up for follow-up in 3 months with a KUB prior. She knows to call if she has any kidney stone type pain in the meantime. • History of pneumonia    • History of repair of hiatal hernia 05/03/2020   • Hyperlipidemia    • Hyperplasia, parathyroid ([address])    • Hypertension    • Hypotension 04/13/2022   • Kidney problem    • Kidney stone    • Memory loss Julu2 2020   • Motion sickness    • Motion sickness    • Multiple closed fractures of ribs of right side 03/22/2023   • Nasal bones, closed fracture 03/22/2023   • Neck pain    • Obesity 1978   • Obesity (BMI 30.0-34. 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, left     great toe fusion   • Urinary incontinence    • Urinary tract infection 03/28/2022   • Wears glasses      Past Surgical History:   Procedure Laterality Date   • ABDOMINAL SURGERY  [id number]    Nissen x2 1972 tubal ligarion   • ARTHROSCOPY KNEE     • BREAST BIOPSY      stereotactic-benign   • BREAST BIOPSY      stereo-benign   • BREAST CYST EXCISION Bilateral     benign   • BREAST EXCISIONAL BIOPSY      unknown date-benign   • BREAST EXCISIONAL BIOPSY      unknown date-benign   • BREAST EXCISIONAL BIOPSY      unknown date-benign   • BREAST EXCISIONAL BIOPSY      unknown age-benign   • CARDIAC CATHETERIZATION     • CHOLECYSTECTOMY     • COLONOSCOPY     • EXAMINATION UNDER ANESTHESIA N/A 06/24/2021    Procedure: EXAM UNDER ANESTHESIA (EUA), DISE;  Surgeon: Balta Boss MD;  [facility];  Service: ENT   • HERNIA REPAIR     • HIATAL HERNIA REPAIR     • KNEE SURGERY      Torn maniscus lap surg   • LIPOSUCTION     • LYMPH NODE BIOPSY     • MOHS SURGERY  05/20/2021    left mid forehead-Gautam   • MOHS SURGERY Left 05/27/2021    Left nasal tip- gautam    • MA LIGATION/BIOPSY TEMPORAL ARTERY Left 01/05/2023    Procedure: BIOPSY ARTERY TEMPORAL;  Surgeon: Tanna Joshua DO;  [facility];  Service: Vascular   • MA OPEN IMPLANTATION CRANIAL NERVE VASQUEZ & PULSE GEN N/A 11/10/2021    Procedure: INSERTION UPPER AIRWAY STIMULATOR, INSPIRE IMPLANT;  Surgeon: Balta Boss MD;  [facility];  Service: ENT   • MA UNLISTED PROCEDURE FOOT/TOES Right 07/19/2022    Procedure: 1st metatarsal phalangeal joint fusion;  Surgeon: Chely Catalan DPM;  [facility];  Service: Podiatry   • REDUCTION MAMMAPLASTY Bilateral 2000   • REDUCTION MAMMAPLASTY     • SKIN BIOPSY     • SKIN CANCER EXCISION  2007    squamous cell carcinoma    • SKIN CANCER EXCISION  2007    basal cell carcinoma   • SKIN CANCER EXCISION  2023    SCCIS chest   • SQUAMOUS CELL CARCINOMA EXCISION     • TOE SURGERY Right 08/04/2022    Right Great Toe Fusion   • TONSILLECTOMY     • TUBAL LIGATION     • UPPER GASTROINTESTINAL ENDOSCOPY     • US GUIDED BREAST BIOPSY RIGHT COMPLETE Right 07/18/2022     Family History   Problem Relation Age of Onset   • Heart disease Mother    • Depression Mother    • Hypertension Mother    • COPD Mother    • Hearing loss Mother    • Anxiety disorder Mother    • Heart disease Father    • Lung cancer Father 79 Smoker    • Cancer Father         brain   • Alcohol abuse Father    • Dementia Father    • Hypertension Father    • Thyroid disease Father    • COPD Father    • Arthritis Father    • Brain cancer Father 76   • Hypertension Sister    • Diabetes Sister    • Heart disease Sister    • Thyroid disease Sister    • Cancer Sister         Lympoma, lung   • Lung cancer Sister 78   • Anxiety disorder Sister    • Hypertension Brother    • Diabetes Brother    • Cancer Brother         Throat   • Dementia Brother    • Stroke Brother    • Hypertension Brother    • Heart disease Brother    • Diabetes Brother    • Hypertension Brother    • No Known Problems Son    • No Known Problems Son    • Brain cancer Paternal Aunt         unknown age   • Diabetes Sister    • Hypertension Sister    • Diabetes Brother    • Breast cancer Neg Hx       reports that she quit smoking about 47 years ago. Her smoking use included cigarettes. She started smoking about 55 years ago. She has a 20.00 pack-year smoking history. She has been exposed to tobacco smoke. She has never used smokeless tobacco. She reports current alcohol use. She reports that she does not use drugs. I COMPLETELY REVIEWED THE PAST MEDICAL HISTORY/PAST SURGICAL HISTORY/SOCIAL HISTORY/FAMILY HISTORY/AND MEDICATIONS  AND UPDATED ALL    Objective:     Vitals:   BP sitting on right: 162/80 with a heart rate of 60 and regular  BP standing on right: 168/74 with a heart rate of 64 and regular    Weight (last 2 days)     Date/Time Weight    09/19/23 1054 77.6 (171)        Wt Readings from Last 3 Encounters:   09/19/23 77.6 kg (171 lb)   09/12/23 73.1 kg (161 lb 2.5 oz)   09/06/23 73.1 kg (161 lb 2.5 oz)       Body mass index is 30.78 kg/m².     Physical Exam: General:  No acute distress/weak appearing  Skin:  No acute rash  Eyes:  No scleral icterus, noninjected, no discharge from eyes  ENT:  Moist mucous membranes  Neck:  Supple, no jugular venous distention, trachea is midline, no lymphadenopathy and no thyromegaly  Back   No CVAT  Chest:  Clear to auscultation and percussion, good respiratory effort  CVS:  Regular rate and rhythm without a rub, or gallops or murmurs  Abdomen:  Soft and nontender with normal bowel sounds  Extremities:  No cyanosis and no edema, no arthritic changes, normal range of motion  Neuro:  Grossly intact  Psych:  Alert, oriented x3 and appropriate      Medications:    Current Outpatient Medications:   •  acetaminophen (TYLENOL) 325 mg tablet, Take 3 tablets (975 mg total) by mouth every 8 (eight) hours, Disp: , Rfl: 0  •  albuterol (Ventolin HFA) 90 mcg/act inhaler, Inhale 2 puffs every 6 (six) hours as needed for wheezing, Disp: 54 g, Rfl: 0  •  amLODIPine (NORVASC) 5 mg tablet, Take 1 tablet (5 mg total) by mouth daily, Disp: 90 tablet, Rfl: 1  •  B-D ULTRAFINE III SHORT PEN 31G X 8 MM MISC, , Disp: , Rfl: 3  •  Calcium Citrate 1040 MG TABS, Take 500 mg by mouth Three times a day, Disp: , Rfl:   •  Coenzyme Q10 (CoQ10) 100 MG CAPS, Take 100 mg by mouth in the morning, Disp: , Rfl:   •  Cranberry 200 MG CAPS, Take 200 capsules by mouth 3 (three) times a day, Disp: , Rfl:   •  DULoxetine (CYMBALTA) 30 mg delayed release capsule, Take 1 capsule (30 mg total) by mouth daily, Disp: , Rfl: 0  •  estradiol (ESTRACE VAGINAL) 0.1 mg/g vaginal cream, Apply pea sized amount around urethra and inside vaginal opening 3 times weekly, Disp: 42.5 g, Rfl: 2  •  famotidine (PEPCID) 10 mg tablet, Take 1 tablet (10 mg total) by mouth daily Do not start before September 13, 2023., Disp: 30 tablet, Rfl: 0  •  ferrous sulfate 324 (65 Fe) mg, Take 1 tablet (324 mg total) by mouth every other day, Disp: 45 tablet, Rfl: 0  •  gabapentin (Neurontin) 300 mg capsule, Take 1 capsule (300 mg total) by mouth daily at bedtime, Disp: 90 capsule, Rfl: 0  •  guaiFENesin (MUCINEX) 600 mg 12 hr tablet, Take 1 tablet (600 mg total) by mouth every 12 (twelve) hours for 7 days, Disp: 14 tablet, Rfl: 0  • Icosapent Ethyl 1 g CAPS, Take 1 capsule by mouth 2 (two) times a day, Disp: , Rfl:   •  insulin aspart (NovoLOG) 100 units/mL injection, Inject under the skin 3 (three) times a day before meals 16 units, 16 units, 25 units. , Disp: , Rfl:   •  insulin detemir (Levemir FlexTouch) 100 Units/mL injection pen, Inject 50 Units under the skin every evening , Disp: , Rfl:   •  levothyroxine (Synthroid) 125 mcg tablet, Take 1 tablet (125 mcg total) by mouth daily, Disp: , Rfl:   •  metoprolol tartrate (LOPRESSOR) 50 mg tablet, Take 1 tablet (50 mg total) by mouth every 12 (twelve) hours, Disp: 180 tablet, Rfl: 3  •  pantoprazole (PROTONIX) 40 mg tablet, TAKE 1 TABLET BY MOUTH TWICE A DAY, Disp: 180 tablet, Rfl: 2  •  pramipexole (MIRAPEX) 0.25 mg tablet, Take 1 tablet (0.25 mg total) by mouth daily at bedtime, Disp: 90 tablet, Rfl: 2  •  Probiotic Product (PROBIOTIC PO), Take 1 capsule by mouth 2 (two) times a day, Disp: , Rfl:   •  rosuvastatin (CRESTOR) 5 mg tablet, Take 1 tablet (5 mg total) by mouth daily, Disp: 90 tablet, Rfl: 3  •  torsemide (DEMADEX) 10 mg tablet, Take 0.5 tablets (5 mg total) by mouth every other day Take this medication only if your weight exceeds 172 pounds, Disp: 30 tablet, Rfl: 0  •  benzonatate (TESSALON PERLES) 100 mg capsule, Take 1 capsule (100 mg total) by mouth 3 (three) times a day, Disp: 20 capsule, Rfl: 0  •  QUEtiapine (SEROquel) 25 mg tablet, Take 0.5 tablets (12.5 mg total) by mouth daily at bedtime, Disp: 15 tablet, Rfl: 0  •  senna (SENOKOT) 8.6 mg, Take 1 tablet (8.6 mg total) by mouth daily at bedtime, Disp: 90 tablet, Rfl: 0    Lab, Imaging and other studies: I have personally reviewed pertinent labs. Laboratory Results:  Results for orders placed or performed during the hospital encounter of 09/12/23   Blood culture #1    Specimen: Arm, Left; Blood   Result Value Ref Range    Blood Culture No Growth After 5 Days.     Blood culture #2    Specimen: Arm, Right; Blood   Result Value Ref Range    Blood Culture No Growth After 5 Days.     FLU/RSV/COVID - if FLU/RSV clinically relevant    Specimen: Nose; Nares   Result Value Ref Range    SARS-CoV-2 Negative Negative    INFLUENZA A PCR Negative Negative    INFLUENZA B PCR Negative Negative    RSV PCR Negative Negative   CBC and differential   Result Value Ref Range    WBC 14.33 (H) 4.31 - 10.16 Thousand/uL    RBC 4.10 3.81 - 5.12 Million/uL    Hemoglobin 11.9 11.5 - 15.4 g/dL    Hematocrit 36.3 34.8 - 46.1 %    MCV 89 82 - 98 fL    MCH 29.0 26.8 - 34.3 pg    MCHC 32.8 31.4 - 37.4 g/dL    RDW 17.4 (H) 11.6 - 15.1 %    MPV 10.5 8.9 - 12.7 fL    Platelets 458 738 - 180 Thousands/uL    nRBC 0 /100 WBCs    Neutrophils Relative 78 (H) 43 - 75 %    Immat GRANS % 1 0 - 2 %    Lymphocytes Relative 13 (L) 14 - 44 %    Monocytes Relative 6 4 - 12 %    Eosinophils Relative 2 0 - 6 %    Basophils Relative 0 0 - 1 %    Neutrophils Absolute 11.16 (H) 1.85 - 7.62 Thousands/µL    Immature Grans Absolute 0.12 0.00 - 0.20 Thousand/uL    Lymphocytes Absolute 1.91 0.60 - 4.47 Thousands/µL    Monocytes Absolute 0.79 0.17 - 1.22 Thousand/µL    Eosinophils Absolute 0.29 0.00 - 0.61 Thousand/µL    Basophils Absolute 0.06 0.00 - 0.10 Thousands/µL   Comprehensive metabolic panel   Result Value Ref Range    Sodium 138 135 - 147 mmol/L    Potassium 3.7 3.5 - 5.3 mmol/L    Chloride 104 96 - 108 mmol/L    CO2 25 21 - 32 mmol/L    ANION GAP 9 mmol/L    BUN 22 5 - 25 mg/dL    Creatinine 1.95 (H) 0.60 - 1.30 mg/dL    Glucose 118 65 - 140 mg/dL    Calcium 8.7 8.4 - 10.2 mg/dL    AST 19 13 - 39 U/L    ALT 21 7 - 52 U/L    Alkaline Phosphatase 62 34 - 104 U/L    Total Protein 6.8 6.4 - 8.4 g/dL    Albumin 4.0 3.5 - 5.0 g/dL    Total Bilirubin 0.36 0.20 - 1.00 mg/dL    eGFR 24 ml/min/1.73sq m   Lactic acid   Result Value Ref Range    LACTIC ACID 1.2 0.5 - 2.0 mmol/L   Procalcitonin   Result Value Ref Range    Procalcitonin 0.12 <=0.25 ng/ml   Protime-INR   Result Value Ref Range Protime 12.2 11.6 - 14.5 seconds    INR 0.85 0.84 - 1.19   APTT   Result Value Ref Range    PTT 27 23 - 37 seconds   Blood gas, Venous   Result Value Ref Range    pH, Tato 7.368 7.300 - 7.400    pCO2, Tato 43.3 42.0 - 50.0 mm Hg    pO2, Tato 38.6 35.0 - 45.0 mm Hg    HCO3, Tato 24.4 24 - 30 mmol/L    Base Excess, Tato -1.0 mmol/L    O2 Content, Tato 12.5 ml/dL    O2 HGB, VENOUS 70.6 60.0 - 80.0 %   HS Troponin 0hr (reflex protocol)   Result Value Ref Range    hs TnI 0hr 5 "Refer to ACS Flowchart"- see link ng/L   HS Troponin I 2hr   Result Value Ref Range    hs TnI 2hr 5 "Refer to ACS Flowchart"- see link ng/L    Delta 2hr hsTnI 0 <20 ng/L   HS Troponin I 4hr   Result Value Ref Range    hs TnI 4hr 6 "Refer to ACS Flowchart"- see link ng/L    Delta 4hr hsTnI 1 <20 ng/L   CBC and differential   Result Value Ref Range    WBC 11.84 (H) 4.31 - 10.16 Thousand/uL    RBC 3.72 (L) 3.81 - 5.12 Million/uL    Hemoglobin 10.5 (L) 11.5 - 15.4 g/dL    Hematocrit 33.0 (L) 34.8 - 46.1 %    MCV 89 82 - 98 fL    MCH 28.2 26.8 - 34.3 pg    MCHC 31.8 31.4 - 37.4 g/dL    RDW 17.6 (H) 11.6 - 15.1 %    MPV 10.4 8.9 - 12.7 fL    Platelets 201 929 - 711 Thousands/uL    nRBC 0 /100 WBCs    Neutrophils Relative 80 (H) 43 - 75 %    Immat GRANS % 1 0 - 2 %    Lymphocytes Relative 13 (L) 14 - 44 %    Monocytes Relative 6 4 - 12 %    Eosinophils Relative 0 0 - 6 %    Basophils Relative 0 0 - 1 %    Neutrophils Absolute 9.46 (H) 1.85 - 7.62 Thousands/µL    Immature Grans Absolute 0.15 0.00 - 0.20 Thousand/uL    Lymphocytes Absolute 1.48 0.60 - 4.47 Thousands/µL    Monocytes Absolute 0.67 0.17 - 1.22 Thousand/µL    Eosinophils Absolute 0.03 0.00 - 0.61 Thousand/µL    Basophils Absolute 0.05 0.00 - 0.10 Thousands/µL   Basic metabolic panel   Result Value Ref Range    Sodium 134 (L) 135 - 147 mmol/L    Potassium 4.0 3.5 - 5.3 mmol/L    Chloride 102 96 - 108 mmol/L    CO2 24 21 - 32 mmol/L    ANION GAP 8 mmol/L    BUN 27 (H) 5 - 25 mg/dL    Creatinine 2.06 (H) 0.60 - 1.30 mg/dL    Glucose 248 (H) 65 - 140 mg/dL    Calcium 8.1 (L) 8.4 - 10.2 mg/dL    eGFR 23 ml/min/1.73sq m   ECG 12 lead   Result Value Ref Range    Ventricular Rate 73 BPM    Atrial Rate 73 BPM    HI Interval 224 ms    QRSD Interval 82 ms    QT Interval 408 ms    QTC Interval 449 ms    P [place] 67 degrees    QRS Axis 11 degrees    T Wave Axis 3 degrees   Fingerstick Glucose (POCT)   Result Value Ref Range    POC Glucose 152 (H) 65 - 140 mg/dl   Fingerstick Glucose (POCT)   Result Value Ref Range    POC Glucose 131 65 - 140 mg/dl   Fingerstick Glucose (POCT)   Result Value Ref Range    POC Glucose 255 (H) 65 - 140 mg/dl   Fingerstick Glucose (POCT)   Result Value Ref Range    POC Glucose 221 (H) 65 - 140 mg/dl   Fingerstick Glucose (POCT)   Result Value Ref Range    POC Glucose 243 (H) 65 - 140 mg/dl   Fingerstick Glucose (POCT)   Result Value Ref Range    POC Glucose 232 (H) 65 - 140 mg/dl       Results from last 7 days   Lab Units 09/14/23  0416 09/12/23  1507   WBC Thousand/uL 11.84* 14.33*   HEMOGLOBIN g/dL 10.5* 11.9   HEMATOCRIT % 33.0* 36.3   PLATELETS Thousands/uL 256 277   POTASSIUM mmol/L 4.0 3.7   CHLORIDE mmol/L 102 104   CO2 mmol/L 24 25   BUN mg/dL 27* 22   CREATININE mg/dL 2.06* 1.95*   CALCIUM mg/dL 8.1* 8.7           Radiology review:   chest X-ray    Ultrasound      Portions of the record may have been created with voice recognition software. Occasional wrong word or "sound a like" substitutions may have occurred due to the inherent limitations of voice recognition software. Read the chart carefully and recognize, using context, where substitutions have occurred.

## 2023-09-09 NOTE — ASSESSMENT & PLAN NOTE
Lab Results   Component Value Date    HGBA1C 7.5 (H) 09/02/2023       Recent Labs     09/06/23  1221 09/06/23  1308   POCGLU 69 140     Blood Sugar Average: Last 72 hrs:  · Insulin-dependent diabetes  · Home regimen Levemir 50 units at bedtime, NovoLog 16 units with breakfast and lunch, 25 units with dinner  · She will be continued on her home insulin regimen  · Accu-Cheks ACHS with correctional scale insulin  · Monitor for hypoglycemia

## 2023-09-09 NOTE — H&P
[address] [facility]  H&P  Name: Anali Henry 75 y.o. female I MRN: [id number]  Unit/Bed#: S -59 I Date of Admission: 9/9/2023   Date of Service: 9/9/2023 I Hospital Day: 0      Assessment/Plan   * ANDRA on CKD stage IV  Assessment & Plan  · Has a history of CKD stage IV. She is on Demadex 5 mg every other day  · Creatinine this morning 2.59, was 2.07, 3 days ago  · Will hold Demadex, gentle IV hydration  · Continue to monitor, avoid nephrotoxins    AMS (altered mental status)  Assessment & Plan  · Patient brought in from home with reported worsening confusion. The patient was AOx3 on my evaluation. She was able to tell me her name, she knew she was at [facility] and knew she sometimes gets "foggy" which was her definition of being confused. She however was not able to tell me the day/date  · Suspect this is likely multifactorial in the setting of her multiple chronic medical comorbidities and not necessarily related to any acute disease processes  · Also suspect likely underlying dementia. She has not had a formal cognitive evaluation and is planned for referral to [facility] for further evaluation if her mental status did not improve  · We will continue to monitor at this time, supportive care and delirium precautions  · Added PRN Risperdal for agitation, hallucinations, acute delirium  · Melatonin for sleep. Allow for regular sleep/wake cycles. Avoid pain, avoid hypoglycemia    Recent acute cystitis without hematuria  Assessment & Plan  · The patient reports history of recurrent UTI  · Recently discharged from the hospital on 8/19/2023 following admission for altered mental status, UTI and ANDRA on CKD  · Was seen in the ED 9/6/2023, and again diagnosed with UTI. Urine cultures positive for Serratia. Was discharged home on Cefpodoxime  · UA in the ED today was negative for pyuria or bacteriuria.   The patient denies any urinary symptoms  · She is afebrile and without leukocytosis, procalcitonin negative  · We will recommend holding off on additional antibiotics at this time as she is being treated on multiple occasions for presumed UTI    Type 2 diabetes mellitus with diabetic chronic kidney disease [facility])  Assessment & Plan  Lab Results   Component Value Date    HGBA1C 7.5 (H) 09/02/2023       Recent Labs     09/06/23  1221 09/06/23  1308   POCGLU 69 140     Blood Sugar Average: Last 72 hrs:  · Insulin-dependent diabetes  · Home regimen Levemir 50 units at bedtime, NovoLog 16 units with breakfast and lunch, 25 units with dinner  · She will be continued on her home insulin regimen  · Accu-Cheks ACHS with correctional scale insulin  · Monitor for hypoglycemia    Essential hypertension  Assessment & Plan  · Blood pressure 141/90 on my evaluation  · Continue home medications    RLS (restless legs syndrome)  Assessment & Plan  · Continue Mirapex at bedtime    Ambulatory dysfunction  Assessment & Plan  · The patient tells me at home she uses a cane however for longer distances, she uses a walker  · She was observed using her rolling walker in the room with a steady gait  · Continue to monitor. Ambulates with assistance  · I do not believe PT evaluation is needed at this point as she walked well with her walker           VTE Prophylaxis: Enoxaparin (Lovenox)      Code Status: DNR/DNI    Anticipated Length of Stay:  Patient will be admitted on an Inpatient basis with an anticipated length of stay of  > 2 midnights. Justification for Hospital Stay: ANDRA, worsening confusion    Chief Complaint:     Worsening confusion    History of Present Illness:  Bi Davis is a 75 y.o. female with PMH significant for CKD stage IV, recurrent UTIs, hypertension, COPD, type 2 diabetes who presents with worsening confusion. She was seen in the ED 3 days ago and started on oral antibiotics for UTI. Urine cultures at that time positive for Serratia.   On evaluation today, the patient denies any urinary symptoms. She denies any fever, no chills. She does report sometimes she feels confused but this morning felt she was less confused. Denies any nausea or vomiting, no diarrhea or abdominal pain. Review of Systems   Constitutional: Negative. HENT: Negative. Respiratory: Negative. Gastrointestinal: Negative. Genitourinary: Negative. Musculoskeletal: Negative. Neurological: Negative. Psychiatric/Behavioral: Positive for confusion. All other systems reviewed and are negative.       Past Medical History:   Diagnosis Date   • Actinic keratosis    • Acute cystitis without hematuria 04/28/2023   • Acute cystitis without hematuria 04/28/2023   • Acute-on-chronic kidney injury [facility])    • ANDRA (acute kidney injury) ([address]) 05/08/2020   • Allergic    • Allergic rhinitis    • Anesthesia     pt reports "had to use double lumen endo tube for hiatal hernia repair/so surgeon could get to where he needed to work"   • Arthritis    • Aspiration into airway    • Ahumada esophagus 1993    Lot of stress with children   • Basal cell carcinoma 2007    left cheek    • BCC (basal cell carcinoma) 05/27/2021    Left Nasal tip   • Breast discharge 06/19/2023   • Breast pain 06/19/2023   • Cancer ([address])     squamous cell cancer on forhead   • Cancer ([address])     basal cell on nose    • Cholelithiasis    • Chronic kidney disease 2000, 2018    Stones, kidney disease stage 4   • Chronic pain disorder     bilat feet and joint pain on occas   • Colon polyp    • Constipation    • COPD (chronic obstructive pulmonary disease) ([address])    • COVID-19 08/2021    recovered at home/did receive monoclonal infusion   • COVID-19 virus infection 08/16/2021   • Dental bridge present    • Depression    • Diabetes mellitus ([address])     Type 2   • Diabetic neuropathy ([address])    • Disease of thyroid gland    • Dyspnea    • Elevated liver enzymes 04/04/2023   • Epigastric abdominal pain with severe diabetic gastroparesis, status post EGD/Botox injection, 5/14 05/17/2021   • Facial abrasion, initial encounter 03/22/2023   • Fall 03/22/2023   • Family history of thyroid problem    • Fatty liver    • Fracture of orbital floor, blow-out, right, closed, with routine healing, subsequent encounter 03/22/2023   • GERD (gastroesophageal reflux disease)    • Heart burn    • Hiatal hernia    • History of colon polyps 07/30/2021   • History of kidney stones 09/29/2020    Hx of recurrent kidney stones   • History of kidney stones 09/29/2020    Hx of recurrent kidney stones  Formatting of this note might be different from the original. Hx of recurrent kidney stones  Last Assessment & Plan:  Formatting of this note might be different from the original. We will set her up for follow-up in 3 months with a KUB prior. She knows to call if she has any kidney stone type pain in the meantime. • History of pneumonia    • History of repair of hiatal hernia 05/03/2020   • Hyperlipidemia    • Hyperplasia, parathyroid ([address])    • Hypertension    • Hypotension 04/13/2022   • Kidney problem    • Kidney stone    • Memory loss Julu2 2020   • Motion sickness    • Motion sickness    • Multiple closed fractures of ribs of right side 03/22/2023   • Nasal bones, closed fracture 03/22/2023   • Neck pain    • Obesity 1978   • Obesity (BMI 30.0-34. 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, left     great toe fusion   • Urinary incontinence    • Urinary tract infection 03/28/2022   • Wears glasses        Past Surgical History:   Procedure Laterality Date   • ABDOMINAL SURGERY  [id number]    Nissen x2 1972 tubal ligarion   • ARTHROSCOPY KNEE • BREAST BIOPSY      stereotactic-benign   • BREAST BIOPSY      stereo-benign   • BREAST CYST EXCISION Bilateral     benign   • BREAST EXCISIONAL BIOPSY      unknown date-benign   • BREAST EXCISIONAL BIOPSY      unknown date-benign   • BREAST EXCISIONAL BIOPSY      unknown date-benign   • BREAST EXCISIONAL BIOPSY      unknown age-benign   • CARDIAC CATHETERIZATION     • CHOLECYSTECTOMY     • COLONOSCOPY     • EXAMINATION UNDER ANESTHESIA N/A 06/24/2021    Procedure: EXAM UNDER ANESTHESIA (EUA), DISE;  Surgeon: Alicia Wolf MD;  [facility];  Service: ENT   • HERNIA REPAIR     • HIATAL HERNIA REPAIR     • KNEE SURGERY      Torn maniscus lap surg   • LIPOSUCTION     • LYMPH NODE BIOPSY     • MOHS SURGERY  05/20/2021    left mid forehead-Gautam   • MOHS SURGERY Left 05/27/2021    Left nasal tip- gautam    • HI LIGATION/BIOPSY TEMPORAL ARTERY Left 01/05/2023    Procedure: BIOPSY ARTERY TEMPORAL;  Surgeon: Silvana Cano DO;  Location: [facility];  Service: Vascular   • HI OPEN IMPLANTATION CRANIAL NERVE VASQUEZ & PULSE GEN N/A 11/10/2021    Procedure: INSERTION UPPER AIRWAY STIMULATOR, INSPIRE IMPLANT;  Surgeon: Alicia Wolf MD;  [facility];  Service: ENT   • HI UNLISTED PROCEDURE FOOT/TOES Right 07/19/2022    Procedure: 1st metatarsal phalangeal joint fusion;  Surgeon: Madonna Schwartz DPM;  [facility];  Service: Podiatry   • REDUCTION MAMMAPLASTY Bilateral 2000   • REDUCTION MAMMAPLASTY     • SKIN BIOPSY     • SKIN CANCER EXCISION  2007    squamous cell carcinoma    • SKIN CANCER EXCISION  2007    basal cell carcinoma   • SKIN CANCER EXCISION  2023    SCCIS chest   • SQUAMOUS CELL CARCINOMA EXCISION     • TOE SURGERY Right 08/04/2022    Right Great Toe Fusion   • TONSILLECTOMY     • TUBAL LIGATION     • UPPER GASTROINTESTINAL ENDOSCOPY     • US GUIDED BREAST BIOPSY RIGHT COMPLETE Right 07/18/2022       Family History:  Family History   Problem Relation Age of Onset   • Heart disease Mother    • Depression Mother    • Hypertension Mother    • COPD Mother    • Hearing loss Mother    • Anxiety disorder Mother    • Heart disease Father    • Lung cancer Father 79        Smoker    • Cancer Father         brain   • Alcohol abuse Father    • Dementia Father    • Hypertension Father    • Thyroid disease Father    • COPD Father    • Arthritis Father    • Brain cancer Father 76   • Hypertension Sister    • Diabetes Sister    • Heart disease Sister    • Thyroid disease Sister    • Cancer Sister         Lympoma, lung   • Lung cancer Sister 78   • Anxiety disorder Sister    • Hypertension Brother    • Diabetes Brother    • Cancer Brother         Throat   • Dementia Brother    • Stroke Brother    • Hypertension Brother    • Heart disease Brother    • Diabetes Brother    • Hypertension Brother    • No Known Problems Son    • No Known Problems Son    • Brain cancer Paternal Aunt         unknown age   • Diabetes Sister    • Hypertension Sister    • Diabetes Brother    • Breast cancer Neg Hx        Home Meds:  all medications and allergies reviewed    Allergies:    Allergies   Allergen Reactions   • Sulfamethoxazole-Trimethoprim Other (See Comments)     Tongue swelling   • Ciprofloxacin Hives and Itching       Marital Status: /Civil Union     Social History     Substance and Sexual Activity   Alcohol Use Yes    Comment: 1 or 2 a year     Social History     Tobacco Use   Smoking Status Former   • Packs/day: 2.00   • Years: 10.00   • Total pack years: 20.00   • Types: Cigarettes   • Start date: 1968   • Quit date: 1976   • Years since quittin.7   • Passive exposure: Past   Smokeless Tobacco Never   Tobacco Comments    Smoked 2 pack a day     Social History     Substance and Sexual Activity   Drug Use No       Physical Exam:   Vitals:   Blood Pressure: 141/90 (23 1038)  Pulse: 63 (23 1038)  Temperature: 97.6 °F (36.4 °C) (23 1038)  Temp Source: Oral (23 7903)  Respirations: 16 (09/09/23 1038)  SpO2: 96 % (09/09/23 1038)    Physical Exam  Vitals reviewed. Constitutional:       General: She is not in acute distress. Appearance: She is not ill-appearing, toxic-appearing or diaphoretic. HENT:      Head: Normocephalic and atraumatic. Mouth/Throat:      Mouth: Mucous membranes are dry. Eyes:      General: No scleral icterus. Right eye: No discharge. Left eye: No discharge. Cardiovascular:      Rate and Rhythm: Normal rate and regular rhythm. Heart sounds: Normal heart sounds. Pulmonary:      Effort: Pulmonary effort is normal. No respiratory distress. Breath sounds: Normal breath sounds. No wheezing, rhonchi or rales. Abdominal:      General: There is no distension. Palpations: Abdomen is soft. There is no mass. Tenderness: There is no abdominal tenderness. Musculoskeletal:      Cervical back: Neck supple. Right lower leg: No edema. Left lower leg: No edema. Neurological:      General: No focal deficit present. Mental Status: She is alert and oriented to person, place, and time. Mental status is at baseline. Psychiatric:         Mood and Affect: Mood normal.         Lab Results: I have personally reviewed pertinent reports. Results from last 7 days   Lab Units 09/09/23  0706   WBC Thousand/uL 9.78   HEMOGLOBIN g/dL 11.7   HEMATOCRIT % 37.1   PLATELETS Thousands/uL 265   NEUTROS PCT % 59   LYMPHS PCT % 30   MONOS PCT % 7   EOS PCT % 2     Results from last 7 days   Lab Units 09/09/23  0706   POTASSIUM mmol/L 4.6   CHLORIDE mmol/L 101   CO2 mmol/L 27   BUN mg/dL 26*   CREATININE mg/dL 2.59*   CALCIUM mg/dL 8.9   ALK PHOS U/L 65   ALT U/L 20   AST U/L 16           Imaging: I have personally reviewed pertinent reports. CT head without contrast    Result Date: 9/6/2023  Narrative: CT BRAIN - WITHOUT CONTRAST INDICATION:   AMS.  COMPARISON: March 22, 2023 TECHNIQUE:  CT examination of the brain was performed. Multiplanar 2D reformatted images were created from the source data. Radiation dose length product (DLP) for this visit:  1003 mGy-cm . This examination, like all CT scans performed in the [facility], was performed utilizing techniques to minimize radiation dose exposure, including the use of iterative reconstruction and automated exposure control. IMAGE QUALITY:  Diagnostic. FINDINGS: PARENCHYMA: Decreased attenuation is noted in periventricular and subcortical white matter demonstrating an appearance that is statistically most likely to represent mild microangiopathic change; this appearance is similar when compared to most recent prior examination. No CT signs of acute infarction. No intracranial mass, mass effect or midline shift. No acute parenchymal hemorrhage. VENTRICLES AND EXTRA-AXIAL SPACES:  Normal for the patient's age. VISUALIZED ORBITS: Normal visualized orbits. PARANASAL SINUSES: Mild mucosal thickening in the right maxillary sinus, resolution of prior layering fluid. CALVARIUM AND EXTRACRANIAL SOFT TISSUES: No acute fractures. Prior nasal bone fractures noted. Impression: No acute intracranial abnormality. Stable chronic microangiopathic changes within the brain. Workstation performed: [id number]     CT renal stone study abdomen pelvis wo contrast    Result Date: 8/16/2023  Narrative: CT ABDOMEN AND PELVIS WITHOUT IV CONTRAST - LOW DOSE RENAL STONE INDICATION:   Flank pain, kidney stone suspected left flank pain, history of nephrolithiasis, also c/o pain in coccyx s/p fall yesterday. COMPARISON: CT abdomen pelvis March 30, 2022 TECHNIQUE:  Low radiation dose thin section CT examination of the abdomen and pelvis was performed without intravenous or oral contrast according to a protocol specifically designed to evaluate for urinary tract calculus.   Axial, sagittal, and coronal 2D  reformatted images were created from the source data and submitted for interpretation. Evaluation for pathology in the abdomen and pelvis that is unrelated to urinary tract calculi is limited. Radiation dose length product (DLP) for this visit:  200 mGy-cm . This examination, like all CT scans performed in the [facility], was performed utilizing techniques to minimize radiation dose exposure, including the use of iterative reconstruction and automated exposure control. URINARY TRACT FINDINGS: RIGHT KIDNEY AND URETER: Renal cortical thinning. 0.8 cm hemorrhagic cyst in the upper pole. No nephrolithiasis or hydroureteronephrosis. LEFT KIDNEY AND URETER: Renal cortical thinning. Few calculi in the upper pole measuring up to 0.6 cm. Two  1.1 cm hemorrhagic cysts in the lower pole. No hydroureteronephrosis. URINARY BLADDER:  Unremarkable. ADDITIONAL FINDINGS: LOWER CHEST:  No clinically significant abnormality identified in the visualized lower chest. SOLID VISCERA: Limited low radiation dose noncontrast CT evaluation demonstrates no clinically significant abnormality of the imaged portions of the liver, spleen, pancreas, or adrenal glands. GALLBLADDER/BILIARY TREE: Gallbladder is surgically absent. No biliary dilatation. STOMACH AND BOWEL: Postsurgical change at the gastroesophageal junction. No bowel obstruction. APPENDIX:  No findings to suggest appendicitis. ABDOMINOPELVIC CAVITY:  No ascites. No pneumoperitoneum. No lymphadenopathy. VESSELS: Calcified atherosclerosis without aortic aneurysm. REPRODUCTIVE ORGANS:  Unremarkable for patient's age. ABDOMINAL WALL/INGUINAL REGIONS: Postsurgical change. OSSEOUS STRUCTURES: No acute fracture or destructive osseous lesion. Spinal degenerative changes are noted. Impression: 1. No findings of acute abdominopelvic injury or acute abdominopelvic pathology. 2.  Nonobstructing left renal calculi measuring up to 0.6 cm without hydroureteronephrosis.  Workstation performed: [id number]     ** Please Note: Dragon 360 Dictation voice to text software may have been used in the creation of this document.  **

## 2023-09-09 NOTE — ASSESSMENT & PLAN NOTE
CT angiogram chest with IV contrast and 3-D imaging



HISTORY: Cough. Dyspnea.



COMPARISON: 3/4/2020.



FINDINGS: There is good contrast opacification pulmonary arteries and thoracic aorta with normal bran
dedra of the great vessels at the aortic arch. Tiny nonspecific subpleural nodule is apparent at

the right posterolateral lung base. Calcified subpleural granuloma at the lingula of the left upper l
obe.



Lungs are now well-inflated. The infiltrates that were on the prior study are no longer present.



No pleural fluid or mediastinal adenopathy.



  



IMPRESSION :

No CT evidence of pulmonary embolus.



Interval resolution of the multifocal infiltrates are present on the previous exam. No new abnormalit
ies.



Reported By: KYLEE Dunbar 

Electronically Signed:  4/15/2020 11:24 PM · The patient tells me at home she uses a cane however for longer distances, she uses a walker  · She was observed using her rolling walker in the room with a steady gait  · Continue to monitor.   Ambulates with assistance  · I do not believe PT evaluation is needed at this point as she walked well with her walker

## 2023-09-09 NOTE — ASSESSMENT & PLAN NOTE
· Patient brought in from home with reported worsening confusion. The patient was AOx3 on my evaluation. She was able to tell me her name, she knew she was at [facility] and knew she sometimes gets "foggy" which was her definition of being confused. She however was not able to tell me the day/date  · Suspect this is likely multifactorial in the setting of her multiple chronic medical comorbidities and not necessarily related to any acute disease processes  · Also suspect likely underlying dementia. She has not had a formal cognitive evaluation and is planned for referral to [facility] for further evaluation if her mental status did not improve  · We will continue to monitor at this time, supportive care and delirium precautions  · Added PRN Risperdal for agitation, hallucinations, acute delirium  · Melatonin for sleep. Allow for regular sleep/wake cycles.   Avoid pain, avoid hypoglycemia

## 2023-09-09 NOTE — ASSESSMENT & PLAN NOTE
· The patient reports history of recurrent UTI  · Recently discharged from the hospital on 8/19/2023 following admission for altered mental status, UTI and ANDRA on CKD  · Was seen in the ED 9/6/2023, and again diagnosed with UTI. Urine cultures positive for Serratia. Was discharged home on Cefpodoxime  · UA in the ED today was negative for pyuria or bacteriuria.   The patient denies any urinary symptoms  · She is afebrile and without leukocytosis, procalcitonin negative  · We will recommend holding off on additional antibiotics at this time as she is being treated on multiple occasions for presumed UTI

## 2023-09-10 DIAGNOSIS — E03.9 HYPOTHYROIDISM, UNSPECIFIED TYPE: Primary | ICD-10-CM

## 2023-09-10 LAB
ANION GAP SERPL CALCULATED.3IONS-SCNC: 10 MMOL/L
ATRIAL RATE: 64 BPM
BUN SERPL-MCNC: 27 MG/DL (ref 5–25)
CALCIUM SERPL-MCNC: 7.9 MG/DL (ref 8.4–10.2)
CHLORIDE SERPL-SCNC: 105 MMOL/L (ref 96–108)
CO2 SERPL-SCNC: 22 MMOL/L (ref 21–32)
CREAT SERPL-MCNC: 2.24 MG/DL (ref 0.6–1.3)
ERYTHROCYTE [DISTWIDTH] IN BLOOD BY AUTOMATED COUNT: 17.1 % (ref 11.6–15.1)
GFR SERPL CREATININE-BSD FRML MDRD: 20 ML/MIN/1.73SQ M
GLUCOSE SERPL-MCNC: 114 MG/DL (ref 65–140)
GLUCOSE SERPL-MCNC: 117 MG/DL (ref 65–140)
GLUCOSE SERPL-MCNC: 155 MG/DL (ref 65–140)
GLUCOSE SERPL-MCNC: 175 MG/DL (ref 65–140)
GLUCOSE SERPL-MCNC: 71 MG/DL (ref 65–140)
HCT VFR BLD AUTO: 32.2 % (ref 34.8–46.1)
HGB BLD-MCNC: 10.1 G/DL (ref 11.5–15.4)
MCH RBC QN AUTO: 27.8 PG (ref 26.8–34.3)
MCHC RBC AUTO-ENTMCNC: 31.4 G/DL (ref 31.4–37.4)
MCV RBC AUTO: 89 FL (ref 82–98)
P AXIS: 41 DEGREES
PLATELET # BLD AUTO: 253 THOUSANDS/UL (ref 149–390)
PMV BLD AUTO: 10.9 FL (ref 8.9–12.7)
POTASSIUM SERPL-SCNC: 4.1 MMOL/L (ref 3.5–5.3)
PR INTERVAL: 242 MS
QRS AXIS: 4 DEGREES
QRSD INTERVAL: 82 MS
QT INTERVAL: 456 MS
QTC INTERVAL: 470 MS
RBC # BLD AUTO: 3.63 MILLION/UL (ref 3.81–5.12)
SODIUM SERPL-SCNC: 137 MMOL/L (ref 135–147)
T WAVE AXIS: 38 DEGREES
VENTRICULAR RATE: 64 BPM
WBC # BLD AUTO: 7.8 THOUSAND/UL (ref 4.31–10.16)

## 2023-09-10 PROCEDURE — 85027 COMPLETE CBC AUTOMATED: CPT | Performed by: INTERNAL MEDICINE

## 2023-09-10 PROCEDURE — 82948 REAGENT STRIP/BLOOD GLUCOSE: CPT

## 2023-09-10 PROCEDURE — 93010 ELECTROCARDIOGRAM REPORT: CPT | Performed by: INTERNAL MEDICINE

## 2023-09-10 PROCEDURE — 93005 ELECTROCARDIOGRAM TRACING: CPT

## 2023-09-10 PROCEDURE — 80048 BASIC METABOLIC PNL TOTAL CA: CPT | Performed by: INTERNAL MEDICINE

## 2023-09-10 PROCEDURE — 99232 SBSQ HOSP IP/OBS MODERATE 35: CPT | Performed by: PHYSICIAN ASSISTANT

## 2023-09-10 RX ORDER — TORSEMIDE 10 MG/1
5 TABLET ORAL ONCE
Status: COMPLETED | OUTPATIENT
Start: 2023-09-10 | End: 2023-09-10

## 2023-09-10 RX ADMIN — PANTOPRAZOLE SODIUM 40 MG: 40 TABLET, DELAYED RELEASE ORAL at 08:25

## 2023-09-10 RX ADMIN — PRAMIPEXOLE DIHYDROCHLORIDE 0.25 MG: 0.25 TABLET ORAL at 22:59

## 2023-09-10 RX ADMIN — FAMOTIDINE 10 MG: 20 TABLET, FILM COATED ORAL at 08:25

## 2023-09-10 RX ADMIN — OMEGA-3 FATTY ACIDS CAP 1000 MG 1000 MG: 1000 CAP at 08:25

## 2023-09-10 RX ADMIN — Medication 3 MG: at 22:58

## 2023-09-10 RX ADMIN — HEPARIN SODIUM 5000 UNITS: 5000 INJECTION INTRAVENOUS; SUBCUTANEOUS at 22:59

## 2023-09-10 RX ADMIN — METOPROLOL TARTRATE 50 MG: 50 TABLET, FILM COATED ORAL at 08:26

## 2023-09-10 RX ADMIN — HEPARIN SODIUM 5000 UNITS: 5000 INJECTION INTRAVENOUS; SUBCUTANEOUS at 15:07

## 2023-09-10 RX ADMIN — METOPROLOL TARTRATE 50 MG: 50 TABLET, FILM COATED ORAL at 22:59

## 2023-09-10 RX ADMIN — INSULIN LISPRO 1 UNITS: 100 INJECTION, SOLUTION INTRAVENOUS; SUBCUTANEOUS at 18:03

## 2023-09-10 RX ADMIN — LEVOTHYROXINE SODIUM 112 MCG: 112 TABLET ORAL at 05:05

## 2023-09-10 RX ADMIN — Medication 250 MG: at 22:58

## 2023-09-10 RX ADMIN — INSULIN LISPRO 1 UNITS: 100 INJECTION, SOLUTION INTRAVENOUS; SUBCUTANEOUS at 23:01

## 2023-09-10 RX ADMIN — INSULIN DETEMIR 50 UNITS: 100 INJECTION, SOLUTION SUBCUTANEOUS at 23:00

## 2023-09-10 RX ADMIN — GABAPENTIN 300 MG: 300 CAPSULE ORAL at 22:59

## 2023-09-10 RX ADMIN — PANTOPRAZOLE SODIUM 40 MG: 40 TABLET, DELAYED RELEASE ORAL at 18:04

## 2023-09-10 RX ADMIN — Medication 1 TABLET: at 12:36

## 2023-09-10 RX ADMIN — ACETAMINOPHEN 975 MG: 325 TABLET, FILM COATED ORAL at 08:27

## 2023-09-10 RX ADMIN — DULOXETINE HYDROCHLORIDE 60 MG: 60 CAPSULE, DELAYED RELEASE ORAL at 08:25

## 2023-09-10 RX ADMIN — TORSEMIDE 5 MG: 10 TABLET ORAL at 08:25

## 2023-09-10 RX ADMIN — STANDARDIZED SENNA CONCENTRATE 8.6 MG: 8.6 TABLET ORAL at 22:59

## 2023-09-10 RX ADMIN — Medication 1 TABLET: at 18:04

## 2023-09-10 RX ADMIN — INSULIN LISPRO 16 UNITS: 100 INJECTION, SOLUTION INTRAVENOUS; SUBCUTANEOUS at 08:25

## 2023-09-10 RX ADMIN — ATORVASTATIN CALCIUM 10 MG: 10 TABLET, FILM COATED ORAL at 18:04

## 2023-09-10 RX ADMIN — Medication 1 TABLET: at 08:25

## 2023-09-10 RX ADMIN — Medication 250 MG: at 08:25

## 2023-09-10 RX ADMIN — AMLODIPINE BESYLATE 5 MG: 5 TABLET ORAL at 08:25

## 2023-09-10 RX ADMIN — HEPARIN SODIUM 5000 UNITS: 5000 INJECTION INTRAVENOUS; SUBCUTANEOUS at 05:05

## 2023-09-10 NOTE — ASSESSMENT & PLAN NOTE
· The patient tells me at home she uses a cane however for longer distances, she uses a walker  · She was observed using her rolling walker in the room with a steady gait  · Continue to monitor.   Ambulates with assistance  · I do not believe PT evaluation is needed at this point as she walked well with her walker

## 2023-09-10 NOTE — PLAN OF CARE
Problem: PAIN - ADULT  Goal: Verbalizes/displays adequate comfort level or baseline comfort level  Description: Interventions:  - Encourage patient to monitor pain and request assistance  - Assess pain using appropriate pain scale  - Administer analgesics based on type and severity of pain and evaluate response  - Implement non-pharmacological measures as appropriate and evaluate response  - Consider cultural and social influences on pain and pain management  - Notify physician/advanced practitioner if interventions unsuccessful or patient reports new pain  Outcome: Progressing     Problem: INFECTION - ADULT  Goal: Absence or prevention of progression during hospitalization  Description: INTERVENTIONS:  - Assess and monitor for signs and symptoms of infection  - Monitor lab/diagnostic results  - Monitor all insertion sites, i.e. indwelling lines, tubes, and drains  - Monitor endotracheal if appropriate and nasal secretions for changes in amount and color  - [place] appropriate cooling/warming therapies per order  - Administer medications as ordered  - Instruct and encourage patient and family to use good hand hygiene technique  - Identify and instruct in appropriate isolation precautions for identified infection/condition  Outcome: Progressing  Goal: Absence of fever/infection during neutropenic period  Description: INTERVENTIONS:  - Monitor WBC    Outcome: Progressing     Problem: SAFETY ADULT  Goal: Patient will remain free of falls  Description: INTERVENTIONS:  - Educate patient/family on patient safety including physical limitations  - Instruct patient to call for assistance with activity   - Consult OT/PT to assist with strengthening/mobility   - Keep Call bell within reach  - Keep bed low and locked with side rails adjusted as appropriate  - Keep care items and personal belongings within reach  - Initiate and maintain comfort rounds  - Make Fall Risk Sign visible to staff  - Apply yellow socks and bracelet for high fall risk patients  - Consider moving patient to room near nurses station  Outcome: Progressing  Goal: Maintain or return to baseline ADL function  Description: INTERVENTIONS:  -  Assess patient's ability to carry out ADLs; assess patient's baseline for ADL function and identify physical deficits which impact ability to perform ADLs (bathing, care of mouth/teeth, toileting, grooming, dressing, etc.)  - Assess/evaluate cause of self-care deficits   - Assess range of motion  - Assess patient's mobility; develop plan if impaired  - Assess patient's need for assistive devices and provide as appropriate  - Encourage maximum independence but intervene and supervise when necessary  - Involve family in performance of ADLs  - Assess for home care needs following discharge   - Consider OT consult to assist with ADL evaluation and planning for discharge  - Provide patient education as appropriate  Outcome: Progressing  Goal: Maintains/Returns to pre admission functional level  Description: INTERVENTIONS:  - Perform BMAT or MOVE assessment daily.   - Set and communicate daily mobility goal to care team and patient/family/caregiver.    - Collaborate with rehabilitation services on mobility goals if consulted  - Out of bed for toileting  - Record patient progress and toleration of activity level   Outcome: Progressing     Problem: DISCHARGE PLANNING  Goal: Discharge to home or other facility with appropriate resources  Description: INTERVENTIONS:  - Identify barriers to discharge w/patient and caregiver  - Arrange for needed discharge resources and transportation as appropriate  - Identify discharge learning needs (meds, wound care, etc.)  - Arrange for interpretive services to assist at discharge as needed  - Refer to Case Management Department for coordinating discharge planning if the patient needs post-hospital services based on physician/advanced practitioner order or complex needs related to functional status, cognitive ability, or social support system  Outcome: Progressing     Problem: Knowledge Deficit  Goal: Patient/family/caregiver demonstrates understanding of disease process, treatment plan, medications, and discharge instructions  Description: Complete learning assessment and assess knowledge base. Interventions:  - Provide teaching at level of understanding  - Provide teaching via preferred learning methods  Outcome: Progressing     Problem: MOBILITY - ADULT  Goal: Maintain or return to baseline ADL function  Description: INTERVENTIONS:  -  Assess patient's ability to carry out ADLs; assess patient's baseline for ADL function and identify physical deficits which impact ability to perform ADLs (bathing, care of mouth/teeth, toileting, grooming, dressing, etc.)  - Assess/evaluate cause of self-care deficits   - Assess range of motion  - Assess patient's mobility; develop plan if impaired  - Assess patient's need for assistive devices and provide as appropriate  - Encourage maximum independence but intervene and supervise when necessary  - Involve family in performance of ADLs  - Assess for home care needs following discharge   - Consider OT consult to assist with ADL evaluation and planning for discharge  - Provide patient education as appropriate  Outcome: Progressing  Goal: Maintains/Returns to pre admission functional level  Description: INTERVENTIONS:  - Perform BMAT or MOVE assessment daily.   - Set and communicate daily mobility goal to care team and patient/family/caregiver.    - Collaborate with rehabilitation services on mobility goals if consulted  - Out of bed for toileting  - Record patient progress and toleration of activity level   Outcome: Progressing

## 2023-09-10 NOTE — PROGRESS NOTES
[facility]  Progress Note  Name: Ghislaine Hoyt  MRN: [id number]  Unit/Bed#: S -14 I Date of Admission: 9/9/2023   Date of Service: 9/10/2023 I Hospital Day: 1    Assessment/Plan   * ANDRA on CKD stage IV  Assessment & Plan  · Has a history of CKD stage IV. She is on Demadex 5 mg every other day  · Creatinine on admit 2.59, was 2.07, 3 days ago  · Improved to 2.24  · Resume Demadex today  · Continue to monitor, avoid nephrotoxins    Recent acute cystitis without hematuria  Assessment & Plan  · The patient reports history of recurrent UTI  · Recently discharged from the hospital on 8/19/2023 following admission for altered mental status, UTI and ANDRA on CKD  · Was seen in the ED 9/6/2023, and again diagnosed with UTI. Urine cultures positive for Serratia. Was discharged home on Cefpodoxime  · UA in the ED today was negative for pyuria or bacteriuria. The patient denies any urinary symptoms  · She is afebrile and without leukocytosis, procalcitonin negative  · We will recommend holding off on additional antibiotics at this time as she is being treated on multiple occasions for presumed UTI    Ambulatory dysfunction  Assessment & Plan  · The patient tells me at home she uses a cane however for longer distances, she uses a walker  · She was observed using her rolling walker in the room with a steady gait  · Continue to monitor. Ambulates with assistance  · I do not believe PT evaluation is needed at this point as she walked well with her walker    Essential hypertension  Assessment & Plan  · Blood pressure 141/90 on my evaluation  · Continue home medications    AMS (altered mental status)  Assessment & Plan  · Patient brought in from home with reported worsening confusion. The patient was AOx3 on my evaluation. She was able to tell me her name, she knew she was at [facility] and knew she sometimes gets "foggy" which was her definition of being confused.   She however was not able to tell me the day/date  · Suspect this is likely multifactorial in the setting of her multiple chronic medical comorbidities and not necessarily related to any acute disease processes  · Also suspect likely underlying dementia. She has not had a formal cognitive evaluation and is planned for referral to [facility] for further evaluation if her mental status did not improve  · Check urinary retention protocol  · We will continue to monitor at this time, supportive care and delirium precautions  · Added PRN Risperdal for agitation, hallucinations, acute delirium  · Melatonin for sleep. Allow for regular sleep/wake cycles. Avoid pain, avoid hypoglycemia  · Geriatrics eval Monday    Type 2 diabetes mellitus with diabetic chronic kidney disease [facility])  Assessment & Plan  Lab Results   Component Value Date    HGBA1C 7.5 (H) 09/02/2023       Recent Labs     09/09/23  1115 09/09/23  1547 09/09/23  2041 09/10/23  0627   POCGLU 240* 191* 158* 114     Blood Sugar Average: Last 72 hrs:  · (P) 175. 75Insulin-dependent diabetes  · Home regimen Levemir 50 units at bedtime, NovoLog 16 units with breakfast and lunch, 25 units with dinner  · She will be continued on her home insulin regimen  · Accu-Cheks ACHS with correctional scale insulin  · Monitor for hypoglycemia    RLS (restless legs syndrome)  Assessment & Plan  · Continue Mirapex at bedtime         VTE Pharmacologic Prophylaxis: VTE Score: 3 Moderate Risk (Score 3-4) - Pharmacological DVT Prophylaxis Ordered: heparin. Patient Centered Rounds: I performed bedside rounds with nursing staff today. Discussions with Specialists or Other Care Team Provider: feli, rn    Education and Discussions with Family / Patient: Updated  () at bedside. Time Spent for Care: 45 minutes. More than 50% of total time spent on counseling and coordination of care as described above.     Current Length of Stay: 1 day(s)  Current Patient Status: Inpatient Certification Statement: The patient will continue to require additional inpatient hospital stay due to ANDRA with confusion requiring serial lab monitoring and geriatrics eval tomorrow  Discharge Plan: Anticipate discharge in 48-72 hrs to discharge location to be determined pending rehab evaluations. Code Status: Level 3 - DNAR and DNI    Subjective:   Overnight events reported. Patient's mental status is about the same as when she came in. Had some chest pain this morning but had this overnight. EKG was reviewed and it was normal.    Objective:     Vitals:   Temp (24hrs), Av °F (36.7 °C), Min:97.6 °F (36.4 °C), Max:98.2 °F (36.8 °C)    Temp:  [97.6 °F (36.4 °C)-98.2 °F (36.8 °C)] 97.6 °F (36.4 °C)  HR:  [61-64] 64  Resp:  [16-17] 16  BP: (131-173)/(73-87) 173/87  SpO2:  [93 %-96 %] 93 %  There is no height or weight on file to calculate BMI. Input and Output Summary (last 24 hours): Intake/Output Summary (Last 24 hours) at 9/10/2023 1052  Last data filed at 9/10/2023 0807  Gross per 24 hour   Intake --   Output 400 ml   Net -400 ml       Physical Exam:   Physical Exam  Vitals and nursing note reviewed. Constitutional:       General: She is not in acute distress. Appearance: Normal appearance. HENT:      Head: Normocephalic. Mouth/Throat:      Mouth: Mucous membranes are moist.   Eyes:      General: No scleral icterus. Pupils: Pupils are equal, round, and reactive to light. Cardiovascular:      Rate and Rhythm: Normal rate and regular rhythm. Heart sounds: No murmur heard. Pulmonary:      Effort: Pulmonary effort is normal. No respiratory distress. Breath sounds: Normal breath sounds. No wheezing, rhonchi or rales. Abdominal:      General: Bowel sounds are normal. There is no distension. Palpations: Abdomen is soft. Tenderness: There is no abdominal tenderness. Musculoskeletal:         General: No swelling. Right lower leg: No edema.       Left lower leg: No edema. Skin:     Capillary Refill: Capillary refill takes less than 2 seconds. Neurological:      General: No focal deficit present. Mental Status: She is alert and oriented to person, place, and time. Mental status is at baseline. Additional Data:     Labs:  Results from last 7 days   Lab Units 09/10/23  0444 09/09/23  0706   WBC Thousand/uL 7.80 9.78   HEMOGLOBIN g/dL 10.1* 11.7   HEMATOCRIT % 32.2* 37.1   PLATELETS Thousands/uL 253 265   NEUTROS PCT %  --  59   LYMPHS PCT %  --  30   MONOS PCT %  --  7   EOS PCT %  --  2     Results from last 7 days   Lab Units 09/10/23  0444 09/09/23  0706   SODIUM mmol/L 137 137   POTASSIUM mmol/L 4.1 4.6   CHLORIDE mmol/L 105 101   CO2 mmol/L 22 27   BUN mg/dL 27* 26*   CREATININE mg/dL 2.24* 2.59*   ANION GAP mmol/L 10 9   CALCIUM mg/dL 7.9* 8.9   ALBUMIN g/dL  --  3.9   TOTAL BILIRUBIN mg/dL  --  0.35   ALK PHOS U/L  --  65   ALT U/L  --  20   AST U/L  --  16   GLUCOSE RANDOM mg/dL 117 195*         Results from last 7 days   Lab Units 09/10/23  0627 09/09/23  2041 09/09/23  1547 09/09/23  1115 09/06/23  1308 09/06/23  1221   POC GLUCOSE mg/dl 114 158* 191* 240* 140 69         Results from last 7 days   Lab Units 09/09/23  0706   LACTIC ACID mmol/L 1.8   PROCALCITONIN ng/ml 0.09       Lines/Drains:  Invasive Devices     Peripheral Intravenous Line  Duration           Peripheral IV 09/09/23 Left;Ventral (anterior) Forearm <1 day                      Imaging: No pertinent imaging reviewed. Recent Cultures (last 7 days):   Results from last 7 days   Lab Units 09/09/23  0709 09/09/23  0707 09/06/23  1008   BLOOD CULTURE  Received in Microbiology Lab. Culture in Progress. Received in Microbiology Lab. Culture in Progress.   --    URINE CULTURE   --   --  >100,000 cfu/ml Serratia marcescens*       Last 24 Hours Medication List:   Current Facility-Administered Medications   Medication Dose Route Frequency Provider Last Rate   • acetaminophen  975 mg Oral Q8H PRN Queenie Ch MD     • albuterol  2 puff Inhalation Q6H PRN Riana Del Valle MD     • amLODIPine  5 mg Oral Daily Riana Del Valle MD     • atorvastatin  10 mg Oral Daily With Christina Camarena MD     • calcium carbonate  1 tablet Oral TID With Meals Queenie Ch MD     • DULoxetine  60 mg Oral Daily Riana Del Valle MD     • [START ON 9/11/2023] estradiol  1 g Vaginal Once per day on Mon Wed Fri Queenie Ch MD     • famotidine  10 mg Oral Daily Riana Del Valle MD     • ferrous sulfate  325 mg Oral Every Other Day Queenie Ch MD     • fish oil  1,000 mg Oral Daily Queenie Ch MD     • gabapentin  300 mg Oral HS Riana Del Valle MD     • heparin (porcine)  5,000 Units Subcutaneous Q8H [address] Riana Del Valle MD     • insulin detemir  50 Units Subcutaneous HS Riana Del Valle MD     • insulin lispro  1-5 Units Subcutaneous TID AC Riana Del Valle MD     • insulin lispro  1-5 Units Subcutaneous HS Riana Del Valle MD     • insulin lispro  16 Units Subcutaneous BID before breakfast/lunch Queenie Ch MD     • insulin lispro  25 Units Subcutaneous Daily With Christina Camarena MD     • levothyroxine  112 mcg Oral QAM Riana Del Valle MD     • melatonin  3 mg Oral HS Riana Del Valle MD     • metoprolol tartrate  50 mg Oral Q12H [address] Riana Del Valle MD     • pantoprazole  40 mg Oral BID Riana Del Valle MD     • pramipexole  0.25 mg Oral HS Riana Del Valle MD     • risperiDONE  0.25 mg Oral Q4H PRN Riana Del Valle MD     • saccharomyces boulardii  250 mg Oral BID Riana Del Valle MD     • senna  8.6 mg Oral HS Queenie Ch MD          Today, Patient Was Seen By: Meeta Hays PA-C    **Please Note: This note may have been constructed using a voice recognition system. **

## 2023-09-10 NOTE — TELEPHONE ENCOUNTER
Please note this medication dose was updated by the patient endocrinologist at [facility] patient is hospitalized and this dose change was not noted in the chart yet. Please be sure hospital team is aware of the recent recommendation.  Thank you

## 2023-09-10 NOTE — ASSESSMENT & PLAN NOTE
· Patient brought in from home with reported worsening confusion. The patient was AOx3 on my evaluation. She was able to tell me her name, she knew she was at [facility] and knew she sometimes gets "foggy" which was her definition of being confused. She however was not able to tell me the day/date  · Suspect this is likely multifactorial in the setting of her multiple chronic medical comorbidities and not necessarily related to any acute disease processes  · Also suspect likely underlying dementia. She has not had a formal cognitive evaluation and is planned for referral to [facility] for further evaluation if her mental status did not improve  · Check urinary retention protocol  · We will continue to monitor at this time, supportive care and delirium precautions  · Added PRN Risperdal for agitation, hallucinations, acute delirium  · Melatonin for sleep. Allow for regular sleep/wake cycles.   Avoid pain, avoid hypoglycemia  · Geriatrics eval Monday

## 2023-09-10 NOTE — ASSESSMENT & PLAN NOTE
Lab Results   Component Value Date    HGBA1C 7.5 (H) 09/02/2023       Recent Labs     09/09/23  1115 09/09/23  1547 09/09/23  2041 09/10/23  0627   POCGLU 240* 191* 158* 114     Blood Sugar Average: Last 72 hrs:  · (P) 175. 75Insulin-dependent diabetes  · Home regimen Levemir 50 units at bedtime, NovoLog 16 units with breakfast and lunch, 25 units with dinner  · She will be continued on her home insulin regimen  · Accu-Cheks ACHS with correctional scale insulin  · Monitor for hypoglycemia

## 2023-09-10 NOTE — PLAN OF CARE
Problem: PAIN - ADULT  Goal: Verbalizes/displays adequate comfort level or baseline comfort level  Description: Interventions:  - Encourage patient to monitor pain and request assistance  - Assess pain using appropriate pain scale  - Administer analgesics based on type and severity of pain and evaluate response  - Implement non-pharmacological measures as appropriate and evaluate response  - Consider cultural and social influences on pain and pain management  - Notify physician/advanced practitioner if interventions unsuccessful or patient reports new pain  Outcome: Progressing     Problem: INFECTION - ADULT  Goal: Absence or prevention of progression during hospitalization  Description: INTERVENTIONS:  - Assess and monitor for signs and symptoms of infection  - Monitor lab/diagnostic results  - Monitor all insertion sites, i.e. indwelling lines, tubes, and drains  - Monitor endotracheal if appropriate and nasal secretions for changes in amount and color  - [place] appropriate cooling/warming therapies per order  - Administer medications as ordered  - Instruct and encourage patient and family to use good hand hygiene technique  - Identify and instruct in appropriate isolation precautions for identified infection/condition  Outcome: Progressing     Problem: SAFETY ADULT  Goal: Patient will remain free of falls  Description: INTERVENTIONS:  - Educate patient/family on patient safety including physical limitations  - Instruct patient to call for assistance with activity   - Consult OT/PT to assist with strengthening/mobility   - Keep Call bell within reach  - Keep bed low and locked with side rails adjusted as appropriate  - Keep care items and personal belongings within reach  - Initiate and maintain comfort rounds  - Make Fall Risk Sign visible to staff  - Apply yellow socks and bracelet for high fall risk patients  - Consider moving patient to room near nurses station  Outcome: Progressing     Problem: DISCHARGE PLANNING  Goal: Discharge to home or other facility with appropriate resources  Description: INTERVENTIONS:  - Identify barriers to discharge w/patient and caregiver  - Arrange for needed discharge resources and transportation as appropriate  - Identify discharge learning needs (meds, wound care, etc.)  - Arrange for interpretive services to assist at discharge as needed  - Refer to Case Management Department for coordinating discharge planning if the patient needs post-hospital services based on physician/advanced practitioner order or complex needs related to functional status, cognitive ability, or social support system  Outcome: Progressing

## 2023-09-10 NOTE — ASSESSMENT & PLAN NOTE
· Has a history of CKD stage IV.   She is on Demadex 5 mg every other day  · Creatinine on admit 2.59, was 2.07, 3 days ago  · Improved to 2.24  · Resume Demadex today  · Continue to monitor, avoid nephrotoxins

## 2023-09-11 PROBLEM — N18.4 CKD (CHRONIC KIDNEY DISEASE) STAGE 4, GFR 15-29 ML/MIN (HCC): Status: ACTIVE | Noted: 2020-05-08

## 2023-09-11 LAB
ANION GAP SERPL CALCULATED.3IONS-SCNC: 8 MMOL/L
BASOPHILS # BLD AUTO: 0.03 THOUSANDS/ÂΜL (ref 0–0.1)
BASOPHILS NFR BLD AUTO: 0 % (ref 0–1)
BUN SERPL-MCNC: 25 MG/DL (ref 5–25)
CALCIUM SERPL-MCNC: 8.3 MG/DL (ref 8.4–10.2)
CHLORIDE SERPL-SCNC: 104 MMOL/L (ref 96–108)
CO2 SERPL-SCNC: 25 MMOL/L (ref 21–32)
CREAT SERPL-MCNC: 2 MG/DL (ref 0.6–1.3)
EOSINOPHIL # BLD AUTO: 0.24 THOUSAND/ÂΜL (ref 0–0.61)
EOSINOPHIL NFR BLD AUTO: 3 % (ref 0–6)
ERYTHROCYTE [DISTWIDTH] IN BLOOD BY AUTOMATED COUNT: 17.2 % (ref 11.6–15.1)
GFR SERPL CREATININE-BSD FRML MDRD: 24 ML/MIN/1.73SQ M
GLUCOSE SERPL-MCNC: 172 MG/DL (ref 65–140)
GLUCOSE SERPL-MCNC: 176 MG/DL (ref 65–140)
GLUCOSE SERPL-MCNC: 187 MG/DL (ref 65–140)
GLUCOSE SERPL-MCNC: 230 MG/DL (ref 65–140)
GLUCOSE SERPL-MCNC: 81 MG/DL (ref 65–140)
HCT VFR BLD AUTO: 32.7 % (ref 34.8–46.1)
HGB BLD-MCNC: 10.8 G/DL (ref 11.5–15.4)
IMM GRANULOCYTES # BLD AUTO: 0.04 THOUSAND/UL (ref 0–0.2)
IMM GRANULOCYTES NFR BLD AUTO: 1 % (ref 0–2)
LYMPHOCYTES # BLD AUTO: 1.88 THOUSANDS/ÂΜL (ref 0.6–4.47)
LYMPHOCYTES NFR BLD AUTO: 25 % (ref 14–44)
MAGNESIUM SERPL-MCNC: 1.9 MG/DL (ref 1.9–2.7)
MCH RBC QN AUTO: 28.6 PG (ref 26.8–34.3)
MCHC RBC AUTO-ENTMCNC: 33 G/DL (ref 31.4–37.4)
MCV RBC AUTO: 87 FL (ref 82–98)
MONOCYTES # BLD AUTO: 0.34 THOUSAND/ÂΜL (ref 0.17–1.22)
MONOCYTES NFR BLD AUTO: 5 % (ref 4–12)
NEUTROPHILS # BLD AUTO: 4.94 THOUSANDS/ÂΜL (ref 1.85–7.62)
NEUTS SEG NFR BLD AUTO: 66 % (ref 43–75)
NRBC BLD AUTO-RTO: 0 /100 WBCS
PLATELET # BLD AUTO: 258 THOUSANDS/UL (ref 149–390)
PMV BLD AUTO: 10.3 FL (ref 8.9–12.7)
POTASSIUM SERPL-SCNC: 3.3 MMOL/L (ref 3.5–5.3)
RBC # BLD AUTO: 3.77 MILLION/UL (ref 3.81–5.12)
SODIUM SERPL-SCNC: 137 MMOL/L (ref 135–147)
T4 FREE SERPL-MCNC: 1.12 NG/DL (ref 0.61–1.12)
TSH SERPL DL<=0.05 MIU/L-ACNC: 12.36 UIU/ML (ref 0.45–4.5)
VIT B12 SERPL-MCNC: 234 PG/ML (ref 180–914)
WBC # BLD AUTO: 7.47 THOUSAND/UL (ref 4.31–10.16)

## 2023-09-11 PROCEDURE — 99222 1ST HOSP IP/OBS MODERATE 55: CPT | Performed by: FAMILY MEDICINE

## 2023-09-11 PROCEDURE — 82948 REAGENT STRIP/BLOOD GLUCOSE: CPT

## 2023-09-11 PROCEDURE — 80048 BASIC METABOLIC PNL TOTAL CA: CPT | Performed by: PHYSICIAN ASSISTANT

## 2023-09-11 PROCEDURE — 83735 ASSAY OF MAGNESIUM: CPT

## 2023-09-11 PROCEDURE — 85025 COMPLETE CBC W/AUTO DIFF WBC: CPT | Performed by: PHYSICIAN ASSISTANT

## 2023-09-11 PROCEDURE — 84443 ASSAY THYROID STIM HORMONE: CPT

## 2023-09-11 PROCEDURE — 82607 VITAMIN B-12: CPT

## 2023-09-11 PROCEDURE — 99232 SBSQ HOSP IP/OBS MODERATE 35: CPT | Performed by: PHYSICIAN ASSISTANT

## 2023-09-11 PROCEDURE — 84439 ASSAY OF FREE THYROXINE: CPT

## 2023-09-11 RX ORDER — LEVOTHYROXINE SODIUM 0.12 MG/1
125 TABLET ORAL DAILY
Start: 2023-09-11

## 2023-09-11 RX ORDER — QUETIAPINE FUMARATE 25 MG/1
12.5 TABLET, FILM COATED ORAL
Status: DISCONTINUED | OUTPATIENT
Start: 2023-09-11 | End: 2023-09-12 | Stop reason: HOSPADM

## 2023-09-11 RX ORDER — ACETAMINOPHEN 325 MG/1
975 TABLET ORAL EVERY 8 HOURS SCHEDULED
Status: DISCONTINUED | OUTPATIENT
Start: 2023-09-11 | End: 2023-09-12 | Stop reason: HOSPADM

## 2023-09-11 RX ORDER — DULOXETIN HYDROCHLORIDE 30 MG/1
30 CAPSULE, DELAYED RELEASE ORAL DAILY
Status: DISCONTINUED | OUTPATIENT
Start: 2023-09-12 | End: 2023-09-12 | Stop reason: HOSPADM

## 2023-09-11 RX ADMIN — INSULIN LISPRO 1 UNITS: 100 INJECTION, SOLUTION INTRAVENOUS; SUBCUTANEOUS at 08:11

## 2023-09-11 RX ADMIN — HEPARIN SODIUM 5000 UNITS: 5000 INJECTION INTRAVENOUS; SUBCUTANEOUS at 14:06

## 2023-09-11 RX ADMIN — ACETAMINOPHEN 975 MG: 325 TABLET, FILM COATED ORAL at 22:07

## 2023-09-11 RX ADMIN — METOPROLOL TARTRATE 50 MG: 50 TABLET, FILM COATED ORAL at 20:18

## 2023-09-11 RX ADMIN — PANTOPRAZOLE SODIUM 40 MG: 40 TABLET, DELAYED RELEASE ORAL at 08:07

## 2023-09-11 RX ADMIN — INSULIN LISPRO 1 UNITS: 100 INJECTION, SOLUTION INTRAVENOUS; SUBCUTANEOUS at 11:25

## 2023-09-11 RX ADMIN — GABAPENTIN 300 MG: 300 CAPSULE ORAL at 22:07

## 2023-09-11 RX ADMIN — AMLODIPINE BESYLATE 5 MG: 5 TABLET ORAL at 08:07

## 2023-09-11 RX ADMIN — LEVOTHYROXINE SODIUM 112 MCG: 112 TABLET ORAL at 06:36

## 2023-09-11 RX ADMIN — FAMOTIDINE 10 MG: 20 TABLET, FILM COATED ORAL at 08:08

## 2023-09-11 RX ADMIN — Medication 1 TABLET: at 11:25

## 2023-09-11 RX ADMIN — INSULIN DETEMIR 50 UNITS: 100 INJECTION, SOLUTION SUBCUTANEOUS at 22:07

## 2023-09-11 RX ADMIN — INSULIN LISPRO 16 UNITS: 100 INJECTION, SOLUTION INTRAVENOUS; SUBCUTANEOUS at 08:11

## 2023-09-11 RX ADMIN — HEPARIN SODIUM 5000 UNITS: 5000 INJECTION INTRAVENOUS; SUBCUTANEOUS at 06:36

## 2023-09-11 RX ADMIN — Medication 250 MG: at 08:07

## 2023-09-11 RX ADMIN — INSULIN LISPRO 16 UNITS: 100 INJECTION, SOLUTION INTRAVENOUS; SUBCUTANEOUS at 11:25

## 2023-09-11 RX ADMIN — Medication 1 TABLET: at 08:08

## 2023-09-11 RX ADMIN — Medication 250 MG: at 20:18

## 2023-09-11 RX ADMIN — STANDARDIZED SENNA CONCENTRATE 8.6 MG: 8.6 TABLET ORAL at 22:08

## 2023-09-11 RX ADMIN — Medication 3 MG: at 22:07

## 2023-09-11 RX ADMIN — DULOXETINE HYDROCHLORIDE 60 MG: 60 CAPSULE, DELAYED RELEASE ORAL at 08:08

## 2023-09-11 RX ADMIN — OMEGA-3 FATTY ACIDS CAP 1000 MG 1000 MG: 1000 CAP at 08:08

## 2023-09-11 RX ADMIN — INSULIN LISPRO 2 UNITS: 100 INJECTION, SOLUTION INTRAVENOUS; SUBCUTANEOUS at 22:16

## 2023-09-11 RX ADMIN — PRAMIPEXOLE DIHYDROCHLORIDE 0.25 MG: 0.25 TABLET ORAL at 22:08

## 2023-09-11 RX ADMIN — FERROUS SULFATE TAB 325 MG (65 MG ELEMENTAL FE) 325 MG: 325 (65 FE) TAB at 08:08

## 2023-09-11 RX ADMIN — PANTOPRAZOLE SODIUM 40 MG: 40 TABLET, DELAYED RELEASE ORAL at 17:37

## 2023-09-11 RX ADMIN — ATORVASTATIN CALCIUM 10 MG: 10 TABLET, FILM COATED ORAL at 17:37

## 2023-09-11 RX ADMIN — METOPROLOL TARTRATE 50 MG: 50 TABLET, FILM COATED ORAL at 08:07

## 2023-09-11 RX ADMIN — HEPARIN SODIUM 5000 UNITS: 5000 INJECTION INTRAVENOUS; SUBCUTANEOUS at 22:09

## 2023-09-11 RX ADMIN — Medication 1 TABLET: at 17:37

## 2023-09-11 RX ADMIN — QUETIAPINE FUMARATE 12.5 MG: 25 TABLET ORAL at 20:18

## 2023-09-11 NOTE — PROGRESS NOTES
[facility]  Progress Note  Name: Rossana Andino I  MRN: [id number]  Unit/Bed#: S -95 I Date of Admission: 9/9/2023   Date of Service: 9/11/2023 I Hospital Day: 2    Assessment/Plan   * AMS (altered mental status)  Assessment & Plan  · Patient brought in from home with reported worsening confusion. The patient was AOx3 on my evaluation. She was able to tell me her name, she knew she was at [facility] and knew she sometimes gets "foggy" which was her definition of being confused. She however was not able to tell me the day/date  · Suspect this is likely multifactorial in the setting of her multiple chronic medical comorbidities and not necessarily related to any acute disease processes  · Also suspect likely underlying dementia. She has not had a formal cognitive evaluation and is planned for [facility] for [facility] evaluation if her mental status did not improve  · Check urinary retention protocol  · We will continue to monitor at this time, supportive care and delirium precautions  · Added PRN Risperdal for agitation, hallucinations, acute delirium  · Melatonin for sleep. Allow for regular sleep/wake cycles. Avoid pain, avoid hypoglycemia  · Geriatrics and cognitive eval     CKD (chronic kidney disease) stage 4, GFR 15-29 ml/min (formerly [facility])  Assessment & Plan  · Has a history of CKD stage IV. She is on Demadex 5 mg every other day  · Creatinine on admit 2.59, was 2.07, 3 days ago  · Now back to baseline  · Resumed Demadex  · Trend BMP    Recent acute cystitis without hematuria  Assessment & Plan  · The patient reports history of recurrent UTI  · Recently discharged from the hospital on 8/19/2023 following admission for altered mental status, UTI and ANDRA on CKD  · Was seen in the ED 9/6/2023, and again diagnosed with UTI. Urine cultures positive for Serratia. Was discharged home on Cefpodoxime  · UA in the ED today was negative for pyuria or bacteriuria.   The patient denies any urinary symptoms  · She is afebrile and without leukocytosis, procalcitonin negative  · We will recommend holding off on additional antibiotics at this time as she is being treated on multiple occasions for presumed UTI    Ambulatory dysfunction  Assessment & Plan  · The patient tells me at home she uses a cane however for longer distances, she uses a walker  · She was observed using her rolling walker in the room with a steady gait  · Continue to monitor. Ambulates with assistance  · I do not believe PT evaluation is needed at this point as she walked well with her walker    Essential hypertension  Assessment & Plan  · Blood pressure 140-150  · Continue home medications    Type 2 diabetes mellitus with diabetic chronic kidney disease [facility])  Assessment & Plan  Lab Results   Component Value Date    HGBA1C 7.5 (H) 09/02/2023       Recent Labs     09/10/23  1122 09/10/23  1601 09/10/23  2041 09/11/23  0711   POCGLU 71 155* 175* 176*     Blood Sugar Average: Last 72 hrs:  · (P) 160   · Insulin-dependent diabetes  · Home regimen Levemir 50 units at bedtime, NovoLog 16 units with breakfast and lunch, 25 units with dinner  · She will be continued on her home insulin regimen  · Accu-Cheks ACHS with correctional scale insulin  · Monitor for hypoglycemia    RLS (restless legs syndrome)  Assessment & Plan  · Continue Mirapex at bedtime         VTE Pharmacologic Prophylaxis: VTE Score: 3 Moderate Risk (Score 3-4) - Pharmacological DVT Prophylaxis Ordered: heparin. Patient Centered Rounds: I performed bedside rounds with nursing staff today. Discussions with Specialists or Other Care Team Provider: feli, rn    Education and Discussions with Family / Patient: Updated  () via phone. Time Spent for Care: 45 minutes. More than 50% of total time spent on counseling and coordination of care as described above.     Current Length of Stay: 2 day(s)  Current Patient Status: Inpatient Certification Statement: The patient will continue to require additional inpatient hospital stay due to ANDRA, confusion needing geriatrics eval and cognitive testing  Discharge Plan: Anticipate discharge in 24-48 hrs to home. Code Status: Level 3 - DNAR and DNI    Subjective:   No overnight events reported by nursing. Patient's mental status waxes and wanes. Objective:     Vitals:   Temp (24hrs), Av.1 °F (36.7 °C), Min:97.8 °F (36.6 °C), Max:98.4 °F (36.9 °C)    Temp:  [97.8 °F (36.6 °C)-98.4 °F (36.9 °C)] 97.8 °F (36.6 °C)  HR:  [58-62] 62  Resp:  [16-20] 18  BP: (144-155)/(73-80) 155/74  SpO2:  [93 %-95 %] 93 %  There is no height or weight on file to calculate BMI. Input and Output Summary (last 24 hours): Intake/Output Summary (Last 24 hours) at 2023 1152  Last data filed at 2023 0132  Gross per 24 hour   Intake 480 ml   Output 950 ml   Net -470 ml       Physical Exam:   Physical Exam  Vitals and nursing note reviewed. Constitutional:       General: She is not in acute distress. Appearance: Normal appearance. HENT:      Head: Normocephalic. Mouth/Throat:      Mouth: Mucous membranes are moist.   Eyes:      General: No scleral icterus. Pupils: Pupils are equal, round, and reactive to light. Cardiovascular:      Rate and Rhythm: Normal rate and regular rhythm. Heart sounds: No murmur heard. Pulmonary:      Effort: Pulmonary effort is normal. No respiratory distress. Breath sounds: Normal breath sounds. No wheezing, rhonchi or rales. Abdominal:      General: Bowel sounds are normal. There is no distension. Palpations: Abdomen is soft. Tenderness: There is no abdominal tenderness. Musculoskeletal:         General: No swelling. Right lower leg: No edema. Left lower leg: No edema. Skin:     Capillary Refill: Capillary refill takes less than 2 seconds. Neurological:      General: No focal deficit present.       Mental Status: She is alert and oriented to person, place, and time. Mental status is at baseline. Additional Data:     Labs:  Results from last 7 days   Lab Units 09/11/23  0904   WBC Thousand/uL 7.47   HEMOGLOBIN g/dL 10.8*   HEMATOCRIT % 32.7*   PLATELETS Thousands/uL 258   NEUTROS PCT % 66   LYMPHS PCT % 25   MONOS PCT % 5   EOS PCT % 3     Results from last 7 days   Lab Units 09/11/23  0904 09/10/23  0444 09/09/23  0706   SODIUM mmol/L 137   < > 137   POTASSIUM mmol/L 3.3*   < > 4.6   CHLORIDE mmol/L 104   < > 101   CO2 mmol/L 25   < > 27   BUN mg/dL 25   < > 26*   CREATININE mg/dL 2.00*   < > 2.59*   ANION GAP mmol/L 8   < > 9   CALCIUM mg/dL 8.3*   < > 8.9   ALBUMIN g/dL  --   --  3.9   TOTAL BILIRUBIN mg/dL  --   --  0.35   ALK PHOS U/L  --   --  65   ALT U/L  --   --  20   AST U/L  --   --  16   GLUCOSE RANDOM mg/dL 187*   < > 195*    < > = values in this interval not displayed. Results from last 7 days   Lab Units 09/11/23  1114 09/11/23  0711 09/10/23  2041 09/10/23  1601 09/10/23  1122 09/10/23  0627 09/09/23  2041 09/09/23  1547 09/09/23  1115 09/06/23  1308 09/06/23  1221   POC GLUCOSE mg/dl 172* 176* 175* 155* 71 114 158* 191* 240* 140 69         Results from last 7 days   Lab Units 09/09/23  0706   LACTIC ACID mmol/L 1.8   PROCALCITONIN ng/ml 0.09       Lines/Drains:  Invasive Devices     Peripheral Intravenous Line  Duration           Peripheral IV 09/09/23 Left;Ventral (anterior) Forearm 1 day              Imaging: No pertinent imaging reviewed. Recent Cultures (last 7 days):   Results from last 7 days   Lab Units 09/09/23  0709 09/09/23  0707 09/06/23  1008   BLOOD CULTURE  No Growth at 24 hrs.  No Growth at 24 hrs.  --    URINE CULTURE   --   --  >100,000 cfu/ml Serratia marcescens*       Last 24 Hours Medication List:   Current Facility-Administered Medications   Medication Dose Route Frequency Provider Last Rate   • acetaminophen  975 mg Oral Q8H PRN Riana Sloan MD     • albuterol  2 puff Inhalation Q6H PRN Zora Dawson MD     • amLODIPine  5 mg Oral Daily Riana Barrera MD     • atorvastatin  10 mg Oral Daily With Marlene Soria MD     • calcium carbonate  1 tablet Oral TID With Meals Riana Barrera MD     • DULoxetine  60 mg Oral Daily Riana Barrera MD     • estradiol  1 g Vaginal Once per day on Mon Wed Fri Zora Dawson MD     • famotidine  10 mg Oral Daily Zora Dawson MD     • ferrous sulfate  325 mg Oral Every Other Day Zora Dawson MD     • fish oil  1,000 mg Oral Daily Zora Dawson MD     • gabapentin  300 mg Oral HS Riana Barrera MD     • heparin (porcine)  5,000 Units Subcutaneous Q8H [facility] Riana Barrera MD     • insulin detemir  50 Units Subcutaneous HS Riana Barrera MD     • insulin lispro  1-5 Units Subcutaneous TID AC Riana Barrera MD     • insulin lispro  1-5 Units Subcutaneous HS Riana Barrera MD     • insulin lispro  16 Units Subcutaneous BID before breakfast/lunch Zora Dawson MD     • insulin lispro  25 Units Subcutaneous Daily With Marlene Soria MD     • levothyroxine  112 mcg Oral QAM Riana Barrera MD     • melatonin  3 mg Oral HS Riana Barrera MD     • metoprolol tartrate  50 mg Oral Q12H [facility] Riana Barrera MD     • pantoprazole  40 mg Oral BID Riana Barrera MD     • pramipexole  0.25 mg Oral HS Riana Barrera MD     • risperiDONE  0.25 mg Oral Q4H PRN Riana Barrera MD     • saccharomyces boulardii  250 mg Oral BID Riana Barrera MD     • senna  8.6 mg Oral HS Zora Dawson MD          Today, Patient Was Seen By: Meggan Melissa PA-C    **Please Note: This note may have been constructed using a voice recognition system. **

## 2023-09-11 NOTE — ASSESSMENT & PLAN NOTE
· Patient brought in from home with reported worsening confusion. The patient was AOx3 on my evaluation. She was able to tell me her name, she knew she was at [facility] and knew she sometimes gets "foggy" which was her definition of being confused. She however was not able to tell me the day/date  · Suspect this is likely multifactorial in the setting of her multiple chronic medical comorbidities and not necessarily related to any acute disease processes  · Also suspect likely underlying dementia. She has not had a formal cognitive evaluation and is planned for referral to [facility] for further evaluation if her mental status did not improve  · Check urinary retention protocol  · We will continue to monitor at this time, supportive care and delirium precautions  · Added PRN Risperdal for agitation, hallucinations, acute delirium  · Melatonin for sleep. Allow for regular sleep/wake cycles.   Avoid pain, avoid hypoglycemia  · Geriatrics and cognitive eval

## 2023-09-11 NOTE — ASSESSMENT & PLAN NOTE
Lab Results   Component Value Date    HGBA1C 7.5 (H) 09/02/2023       Recent Labs     09/10/23  1122 09/10/23  1601 09/10/23  2041 09/11/23  0711   POCGLU 71 155* 175* 176*     Blood Sugar Average: Last 72 hrs:  · (P) 160   · Insulin-dependent diabetes  · Home regimen Levemir 50 units at bedtime, NovoLog 16 units with breakfast and lunch, 25 units with dinner  · She will be continued on her home insulin regimen  · Accu-Cheks ACHS with correctional scale insulin  · Monitor for hypoglycemia

## 2023-09-11 NOTE — CONSULTS
Consultation - Geriatric Medicine   Bassam Keane 75 y.o. female MRN: [id number]  Unit/Bed#: S -10 Encounter: [id number]      Assessment/Plan     1) Probable mild cognitive impairment    -patient brought to the hospital by her  after several days of confusion at home. Patient waking up at odd hours of the night occasionally screaming and insisting on doing house work.      -no prior formal diagnosis of cognitive impairment    -history positive for confusion and altered mental status with UTI    -able to care for herself as long as she is alert. Reportedly was trying to administer incorrect dose of insulin prior to presentation    -patient AAOx2 on today's evaluation, patient responds to questions coherently but will have trouble remembering details. Appears to be at baseline.    -no evidence that an acute process is causing patient's symptoms, undiagnosed chronic process suspected   -CT head unremarkable other than chronic microangiopathic changes    -patient noted to have subclinical hypothyroidism, levothyroxine recently adjusted to 125 mcg daily by PCP  Lab Results   Component Value Date    [id number] 15.028 (H) 09/06/2023    FREET4 0.64 09/06/2023       -Will perform formal cognitive evaluation    -Medications reviewed, reduce cymbalta to 30 mg daily. Avoid antihistamines and benzodiazepines. Change risperidone to seroquel 12.5 mg qhs to reduce delirium and hallucinations.     -maintain constant sleep wake cycle, avoid daytime naps, avoid nighttime awakenings, keep blinds open in day to allow for sunlight    -maintain adequate nutrition and calorie intake    -check B12 levels    2) Delirium    -Patient reports visual hallucinations of seeing "mutant animals"    -could be what is causing the patient to wake up in terrors through the night    -will try seroquel 12.5 mg before bed to help reduce frequency of hallucinations and delirium    -PT requested to help keep patient active during admission, UOOB with supervision as tolerated    2) ambulatory dysfunction      -Patient with history of frequent falls     -ambulates through home with cane or walker     -participates with home physical therapy      -PT/OT evaluation      3) chronic urinary retention    - straight caths the patient on a nightly basis    -history of recurrent UTIs, not apparent on this admission. Patient was treated in the ED about 5 days ago for Serratia positive urine culture with cefpodoxime, patient was asymptomatic at that time. Antibiotics discontinued on this admission     -CT abdomen and pelvis negative for hydronephrosis    -Monitor for urinary retention, straight cath prn, monitor I/O, trend BMP, avoid medications with anticholinergic profile      4) CKD IV    -baseline creatinine approximately 2.0. Creatinine 2.59 POA      Recent Labs     09/09/23  0706 09/10/23  0444 09/11/23  0904   CREATININE 2.59* 2.24* 2.00*   EGFR 17 20 24     Estimated Creatinine Clearance: 23.4 mL/min (A) (by C-G formula based on SCr of 2 mg/dL (H)). CT AP negative for hydronephrosis, no evidence for UTI on this admission  Most likely in the setting of dehydration, improved with gentle IV fluids. Back to baseline, demadex resumed  Trend BMP and I/O      5) Essential Hypertension    -Blood Pressure: 155/74     mantained on amlodipine 5 mg qd, metoprolol 50 mg BID and demadex 5 mg daily    -management per primary team        6) Restless leg syndrome    -continue pramipexole 0.25 mg nightly    7) Constipation, related to iron supplementation    -continue senokot 8.6 mg qhs              History of Present Illness   Physician Requesting Consult: Debbie Lama MD  Reason for Consult / Principal Problem: Confusion/AMS  Hx and PE limited by: Mental status  Additional history obtained from:       HPI: Rajendra Blancas is a 75y.o. year old female who presents with confusion.   Patient has pmhx of chronic urinary retention requiring frequent straight catheterizations, recurrent UTI, CKD IV, hypertension and restless leg syndrome. Patient was here about 5 days ago for altered mental status that was thought to be in the setting of urinary tract infection. She was given oral antibiotics at that time and sent home. She had been compliant with antibiotics up until her return. Per the patient's , patient is generally able to care for herself as long as she is awake and alert. Patient lives in a 2 story home with  and son but limits herself to the first floor. She ambulates using a cane or walker but has a history of frequent falls.  reports that she is able to cook for herself most days and she feeds herself. Patient bathes on her own. He helps her with nightly catheterizations. She no longer drives. Patient reportedly goes to bed at 11 pm at most nights and will wake up between 7 and 9 am.  Normally the patient sleeps through the night but over the last 3 weeks patient has been intermittently waking up around 3 am in what her  describes as night terrors. She then will insist on doing house work at that time as well. Patient also reportedly has a couple good hours in the morning after waking up but then will become drowsy and confused. Her  became confused on the day of admission when she was unable to dress herself properly and she forgot the rules to a card game they were playing. He brought her back to the emergency department for further evaluation. Inpatient consult to Gerontology  Consult performed by: Clement Iqbal MD  Consult ordered by: Addi Reich PA-C          Review of Systems   Constitutional: Negative for chills and fever. HENT: Negative for congestion, rhinorrhea, sinus pressure and sore throat. Eyes: Negative for pain and visual disturbance. Respiratory: Negative for shortness of breath and wheezing. Cardiovascular: Negative for chest pain and palpitations.    Gastrointestinal: Negative for abdominal pain, constipation, diarrhea, nausea and vomiting. Endocrine: Negative for polydipsia and polyuria. Genitourinary: Positive for difficulty urinating. Negative for dysuria. Musculoskeletal: Negative for arthralgias and back pain. Skin: Negative for color change and wound. Neurological: Negative for dizziness, weakness, light-headedness, numbness and headaches. Psychiatric/Behavioral: Positive for confusion. Negative for agitation and behavioral problems.            Historical Information   Past Medical History:   Diagnosis Date   • Actinic keratosis    • Acute cystitis without hematuria 04/28/2023   • Acute cystitis without hematuria 04/28/2023   • Acute-on-chronic kidney injury [facility])    • ANDRA (acute kidney injury) ([address]) 05/08/2020   • Allergic    • Allergic rhinitis    • Anesthesia     pt reports "had to use double lumen endo tube for hiatal hernia repair/so surgeon could get to where he needed to work"   • Arthritis    • Aspiration into airway    • Ahumada esophagus 1993    Lot of stress with children   • Basal cell carcinoma 2007    left cheek    • BCC (basal cell carcinoma) 05/27/2021    Left Nasal tip   • Breast discharge 06/19/2023   • Breast pain 06/19/2023   • Cancer ([address])     squamous cell cancer on forhead   • Cancer ([address])     basal cell on nose    • Cholelithiasis    • Chronic kidney disease 2000, 2018    Stones, kidney disease stage 4   • Chronic pain disorder     bilat feet and joint pain on occas   • Colon polyp    • Constipation    • COPD (chronic obstructive pulmonary disease) ([address])    • COVID-19 08/2021    recovered at home/did receive monoclonal infusion   • COVID-19 virus infection 08/16/2021   • Dental bridge present    • Depression    • Diabetes mellitus ([address])     Type 2   • Diabetic neuropathy ([address])    • Disease of thyroid gland    • Dyspnea    • Elevated liver enzymes 04/04/2023   • Epigastric abdominal pain with severe diabetic gastroparesis, status post EGD/Botox injection, 5/14 05/17/2021   • Facial abrasion, initial encounter 03/22/2023   • Fall 03/22/2023   • Family history of thyroid problem    • Fatty liver    • Fracture of orbital floor, blow-out, right, closed, with routine healing, subsequent encounter 03/22/2023   • GERD (gastroesophageal reflux disease)    • Heart burn    • Hiatal hernia    • History of colon polyps 07/30/2021   • History of kidney stones 09/29/2020    Hx of recurrent kidney stones   • History of kidney stones 09/29/2020    Hx of recurrent kidney stones  Formatting of this note might be different from the original. Hx of recurrent kidney stones  Last Assessment & Plan:  Formatting of this note might be different from the original. We will set her up for follow-up in 3 months with a KUB prior. She knows to call if she has any kidney stone type pain in the meantime. • History of pneumonia    • History of repair of hiatal hernia 05/03/2020   • Hyperlipidemia    • Hyperplasia, parathyroid ([address])    • Hypertension    • Hypotension 04/13/2022   • Kidney problem    • Kidney stone    • Memory loss Julu2 2020   • Motion sickness    • Motion sickness    • Multiple closed fractures of ribs of right side 03/22/2023   • Nasal bones, closed fracture 03/22/2023   • Neck pain    • Obesity 1978   • Obesity (BMI 30.0-34. 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, left     great toe fusion   • Urinary incontinence    • Urinary tract infection 03/28/2022   • Wears glasses      Past Surgical History:   Procedure Laterality Date   • ABDOMINAL SURGERY  [id number]    Nissen x2 1972 tubal ligarion   • ARTHROSCOPY KNEE     • BREAST BIOPSY      stereotactic-benign   • BREAST BIOPSY      stereo-benign   • BREAST CYST EXCISION Bilateral     benign   • BREAST EXCISIONAL BIOPSY      unknown date-benign   • BREAST EXCISIONAL BIOPSY      unknown date-benign   • BREAST EXCISIONAL BIOPSY      unknown date-benign   • BREAST EXCISIONAL BIOPSY      unknown age-benign   • CARDIAC CATHETERIZATION     • CHOLECYSTECTOMY     • COLONOSCOPY     • EXAMINATION UNDER ANESTHESIA N/A 06/24/2021    Procedure: EXAM UNDER ANESTHESIA (EUA), DISE;  Surgeon: Ana Ferguson MD;  [facility];  Service: ENT   • HERNIA REPAIR     • HIATAL HERNIA REPAIR     • KNEE SURGERY      Torn maniscus lap surg   • LIPOSUCTION     • LYMPH NODE BIOPSY     • MOHS SURGERY  05/20/2021    left mid forehead-Gautam   • MOHS SURGERY Left 05/27/2021    Left nasal tip- gautam    • NE LIGATION/BIOPSY TEMPORAL ARTERY Left 01/05/2023    Procedure: BIOPSY ARTERY TEMPORAL;  Surgeon: Diana Marcus DO;  [facility];  Service: Vascular   • NE OPEN IMPLANTATION CRANIAL NERVE VASQUEZ & PULSE GEN N/A 11/10/2021    Procedure: INSERTION UPPER AIRWAY STIMULATOR, INSPIRE IMPLANT;  Surgeon: Ana Ferguson MD;  [facility];  Service: ENT   • NE UNLISTED PROCEDURE FOOT/TOES Right 07/19/2022    Procedure: 1st metatarsal phalangeal joint fusion;  Surgeon: Lenora Winters DPM;  [facility];  Service: Podiatry   • REDUCTION MAMMAPLASTY Bilateral 2000   • REDUCTION MAMMAPLASTY     • SKIN BIOPSY     • SKIN CANCER EXCISION  2007    squamous cell carcinoma    • SKIN CANCER EXCISION  2007    basal cell carcinoma   • SKIN CANCER EXCISION  2023    SCCIS chest   • SQUAMOUS CELL CARCINOMA EXCISION     • TOE SURGERY Right 08/04/2022    Right Great Toe Fusion   • TONSILLECTOMY     • TUBAL LIGATION     • UPPER GASTROINTESTINAL ENDOSCOPY     • US GUIDED BREAST BIOPSY RIGHT COMPLETE Right 07/18/2022     Social History   Social History     Substance and Sexual Activity   Alcohol Use Yes    Comment: 1 or 2 a year     Social History     Substance and Sexual Activity   Drug Use No     Social History     Tobacco Use   Smoking Status Former   • Packs/day: 2.00   • Years: 10.00   • Total pack years: 20.00   • Types: Cigarettes   • Start date: 1968   • Quit date: 1976   • Years since quittin.7   • Passive exposure: Past   Smokeless Tobacco Never   Tobacco Comments    Smoked 2 pack a day     Family History:   Family History   Problem Relation Age of Onset   • Heart disease Mother    • Depression Mother    • Hypertension Mother    • COPD Mother    • Hearing loss Mother    • Anxiety disorder Mother    • Heart disease Father    • Lung cancer Father 79        Smoker    • Cancer Father         brain   • Alcohol abuse Father    • Dementia Father    • Hypertension Father    • Thyroid disease Father    • COPD Father    • Arthritis Father    • Brain cancer Father 76   • Hypertension Sister    • Diabetes Sister    • Heart disease Sister    • Thyroid disease Sister    • Cancer Sister         Lympoma, lung   • Lung cancer Sister 78   • Anxiety disorder Sister    • Hypertension Brother    • Diabetes Brother    • Cancer Brother         Throat   • Dementia Brother    • Stroke Brother    • Hypertension Brother    • Heart disease Brother    • Diabetes Brother    • Hypertension Brother    • No Known Problems Son    • No Known Problems Son    • Brain cancer Paternal Aunt         unknown age   • Diabetes Sister    • Hypertension Sister    • Diabetes Brother    • Breast cancer Neg Hx        Meds/Allergies   Current home medications reviewed with . With the exception of insulin,  is the one administering medication to the patient.     Cefpodoxime 100 mg twice daily  Acetaminophen 175 mg every 8 hours  Albuterol inhaler, 2 puffs every 6 hours as needed  Amlodipine 5 mg daily  Calcium citrate 500 mg 3 times daily with meals  Coenzyme every 10 100 mg every morning  Cranberry capsules 200 mg 3 times daily  Duloxetine 60 mg daily  Famotidine 40 mg daily  Ferrous sulfate 324 mg every other day  Gabapentin 300 mg nightly  Insulin aspart 16 units with breakfast and lunch, 25 units with dinner  Insulin detemir 50 units nightly  Levothyroxine 125 mcg daily  Metoprolol tartrate 50 mg twice daily  Pantoprazole 40 mg daily  Pramipexole 0.25 mg nightly  Rosuvastatin 5 mg daily  Senna 8.6 mg nightly  Torsemide 5 mg as needed for weight over 172 pounds    Allergies   Allergen Reactions   • Sulfamethoxazole-Trimethoprim Other (See Comments)     Tongue swelling   • Ciprofloxacin Hives and Itching       Objective     Intake/Output Summary (Last 24 hours) at 9/11/2023 1141  Last data filed at 9/11/2023 0132  Gross per 24 hour   Intake 780 ml   Output 950 ml   Net -170 ml     Invasive Devices     Peripheral Intravenous Line  Duration           Peripheral IV 09/09/23 Left;Ventral (anterior) Forearm 1 day                Physical Exam  Constitutional:       Appearance: She is obese. She is not ill-appearing. HENT:      Head: Normocephalic and atraumatic. Mouth/Throat:      Mouth: Mucous membranes are dry. Pharynx: Oropharynx is clear. No oropharyngeal exudate. Eyes:      General: No scleral icterus. Extraocular Movements: Extraocular movements intact. Conjunctiva/sclera: Conjunctivae normal.      Pupils: Pupils are equal, round, and reactive to light. Cardiovascular:      Rate and Rhythm: Normal rate and regular rhythm. Pulses: Normal pulses. Heart sounds: Normal heart sounds. Pulmonary:      Effort: Pulmonary effort is normal.      Breath sounds: Normal breath sounds. Abdominal:      General: Abdomen is flat. Bowel sounds are normal. There is no distension. Palpations: Abdomen is soft. Tenderness: There is no abdominal tenderness. Musculoskeletal:         General: Normal range of motion. Cervical back: Normal range of motion.       Right lower leg: No edema. Left lower leg: No edema. Skin:     General: Skin is warm. Coloration: Skin is not jaundiced. Findings: No bruising. Neurological:      General: No focal deficit present. Mental Status: She is alert. Mental status is at baseline. Cranial Nerves: No cranial nerve deficit. Comments: AAOx2, unable to state birthyear, current month or year  Struggles with remembering details   Psychiatric:         Mood and Affect: Mood normal.         Behavior: Behavior normal.         Lab Results:   I have personally reviewed pertinent lab results including the following:  CBC, CMP, magnesium, UA, TSH/T4    I have personally reviewed the following imaging study reports in PACS:  CT and xrays      Therapies:   PT: PT evaluation requested  OT: OT evaluation requested for cognitive evaluation    VTE Prophylaxis: Heparin    Code Status: Level 3 - DNAR and DNI  Advance Directive and Living Will: Yes    Power of :    POLST:      Family and Social Support:  and Son  No data recorded    Goals of Care: level 3 - DN    Thank you for allowing me to participate in your patients' care. Please do not hesitate to call with any additional questions.   Nolvia Morton MD

## 2023-09-11 NOTE — ASSESSMENT & PLAN NOTE
· Has a history of CKD stage IV.   She is on Demadex 5 mg every other day  · Creatinine on admit 2.59, was 2.07, 3 days ago  · Now back to baseline  · Resumed Demadex  · Trend BMP

## 2023-09-12 ENCOUNTER — APPOINTMENT (EMERGENCY)
Dept: RADIOLOGY | Facility: HOSPITAL | Age: 73
End: 2023-09-12
Payer: MEDICARE

## 2023-09-12 ENCOUNTER — HOSPITAL ENCOUNTER (INPATIENT)
Facility: HOSPITAL | Age: 73
LOS: 1 days | Discharge: HOME | End: 2023-09-14
Attending: EMERGENCY MEDICINE | Admitting: INTERNAL MEDICINE
Payer: MEDICARE

## 2023-09-12 VITALS
OXYGEN SATURATION: 91 % | HEART RATE: 68 BPM | TEMPERATURE: 98.3 F | SYSTOLIC BLOOD PRESSURE: 146 MMHG | DIASTOLIC BLOOD PRESSURE: 79 MMHG | RESPIRATION RATE: 18 BRPM

## 2023-09-12 DIAGNOSIS — D72.829 LEUKOCYTOSIS: ICD-10-CM

## 2023-09-12 DIAGNOSIS — J44.9 CHRONIC OBSTRUCTIVE PULMONARY DISEASE, UNSPECIFIED COPD TYPE (HCC): Chronic | ICD-10-CM

## 2023-09-12 DIAGNOSIS — R06.2 WHEEZING: ICD-10-CM

## 2023-09-12 DIAGNOSIS — R05.9 COUGH: ICD-10-CM

## 2023-09-12 DIAGNOSIS — J44.1 COPD EXACERBATION (HCC): Primary | ICD-10-CM

## 2023-09-12 LAB
2HR DELTA HS TROPONIN: 0 NG/L
4HR DELTA HS TROPONIN: 1 NG/L
ALBUMIN SERPL BCP-MCNC: 4 G/DL (ref 3.5–5)
ALP SERPL-CCNC: 62 U/L (ref 34–104)
ALT SERPL W P-5'-P-CCNC: 21 U/L (ref 7–52)
ANION GAP SERPL CALCULATED.3IONS-SCNC: 9 MMOL/L
APTT PPP: 27 SECONDS (ref 23–37)
AST SERPL W P-5'-P-CCNC: 19 U/L (ref 13–39)
BASE EX.OXY STD BLDV CALC-SCNC: 70.6 % (ref 60–80)
BASE EXCESS BLDV CALC-SCNC: -1 MMOL/L
BASOPHILS # BLD AUTO: 0.06 THOUSANDS/ÂΜL (ref 0–0.1)
BASOPHILS NFR BLD AUTO: 0 % (ref 0–1)
BILIRUB SERPL-MCNC: 0.36 MG/DL (ref 0.2–1)
BUN SERPL-MCNC: 22 MG/DL (ref 5–25)
CALCIUM SERPL-MCNC: 8.7 MG/DL (ref 8.4–10.2)
CARDIAC TROPONIN I PNL SERPL HS: 5 NG/L
CARDIAC TROPONIN I PNL SERPL HS: 5 NG/L
CARDIAC TROPONIN I PNL SERPL HS: 6 NG/L
CHLORIDE SERPL-SCNC: 104 MMOL/L (ref 96–108)
CO2 SERPL-SCNC: 25 MMOL/L (ref 21–32)
CREAT SERPL-MCNC: 1.95 MG/DL (ref 0.6–1.3)
EOSINOPHIL # BLD AUTO: 0.29 THOUSAND/ÂΜL (ref 0–0.61)
EOSINOPHIL NFR BLD AUTO: 2 % (ref 0–6)
ERYTHROCYTE [DISTWIDTH] IN BLOOD BY AUTOMATED COUNT: 17.4 % (ref 11.6–15.1)
FLUAV RNA RESP QL NAA+PROBE: NEGATIVE
FLUBV RNA RESP QL NAA+PROBE: NEGATIVE
GFR SERPL CREATININE-BSD FRML MDRD: 24 ML/MIN/1.73SQ M
GLUCOSE SERPL-MCNC: 118 MG/DL (ref 65–140)
GLUCOSE SERPL-MCNC: 152 MG/DL (ref 65–140)
GLUCOSE SERPL-MCNC: 212 MG/DL (ref 65–140)
HCO3 BLDV-SCNC: 24.4 MMOL/L (ref 24–30)
HCT VFR BLD AUTO: 36.3 % (ref 34.8–46.1)
HGB BLD-MCNC: 11.9 G/DL (ref 11.5–15.4)
IMM GRANULOCYTES # BLD AUTO: 0.12 THOUSAND/UL (ref 0–0.2)
IMM GRANULOCYTES NFR BLD AUTO: 1 % (ref 0–2)
INR PPP: 0.85 (ref 0.84–1.19)
LACTATE SERPL-SCNC: 1.2 MMOL/L (ref 0.5–2)
LYMPHOCYTES # BLD AUTO: 1.91 THOUSANDS/ÂΜL (ref 0.6–4.47)
LYMPHOCYTES NFR BLD AUTO: 13 % (ref 14–44)
MCH RBC QN AUTO: 29 PG (ref 26.8–34.3)
MCHC RBC AUTO-ENTMCNC: 32.8 G/DL (ref 31.4–37.4)
MCV RBC AUTO: 89 FL (ref 82–98)
MONOCYTES # BLD AUTO: 0.79 THOUSAND/ÂΜL (ref 0.17–1.22)
MONOCYTES NFR BLD AUTO: 6 % (ref 4–12)
NEUTROPHILS # BLD AUTO: 11.16 THOUSANDS/ÂΜL (ref 1.85–7.62)
NEUTS SEG NFR BLD AUTO: 78 % (ref 43–75)
NRBC BLD AUTO-RTO: 0 /100 WBCS
O2 CT BLDV-SCNC: 12.5 ML/DL
PCO2 BLDV: 43.3 MM HG (ref 42–50)
PH BLDV: 7.37 [PH] (ref 7.3–7.4)
PLATELET # BLD AUTO: 277 THOUSANDS/UL (ref 149–390)
PMV BLD AUTO: 10.5 FL (ref 8.9–12.7)
PO2 BLDV: 38.6 MM HG (ref 35–45)
POTASSIUM SERPL-SCNC: 3.7 MMOL/L (ref 3.5–5.3)
PROCALCITONIN SERPL-MCNC: 0.12 NG/ML
PROT SERPL-MCNC: 6.8 G/DL (ref 6.4–8.4)
PROTHROMBIN TIME: 12.2 SECONDS (ref 11.6–14.5)
RBC # BLD AUTO: 4.1 MILLION/UL (ref 3.81–5.12)
RSV RNA RESP QL NAA+PROBE: NEGATIVE
SARS-COV-2 RNA RESP QL NAA+PROBE: NEGATIVE
SODIUM SERPL-SCNC: 138 MMOL/L (ref 135–147)
WBC # BLD AUTO: 14.33 THOUSAND/UL (ref 4.31–10.16)

## 2023-09-12 PROCEDURE — 99223 1ST HOSP IP/OBS HIGH 75: CPT | Performed by: INTERNAL MEDICINE

## 2023-09-12 PROCEDURE — 87040 BLOOD CULTURE FOR BACTERIA: CPT

## 2023-09-12 PROCEDURE — 82948 REAGENT STRIP/BLOOD GLUCOSE: CPT

## 2023-09-12 PROCEDURE — 84145 PROCALCITONIN (PCT): CPT

## 2023-09-12 PROCEDURE — 0241U HB NFCT DS VIR RESP RNA 4 TRGT: CPT

## 2023-09-12 PROCEDURE — 83605 ASSAY OF LACTIC ACID: CPT

## 2023-09-12 PROCEDURE — 85610 PROTHROMBIN TIME: CPT

## 2023-09-12 PROCEDURE — 94760 N-INVAS EAR/PLS OXIMETRY 1: CPT

## 2023-09-12 PROCEDURE — 97162 PT EVAL MOD COMPLEX 30 MIN: CPT

## 2023-09-12 PROCEDURE — 99285 EMERGENCY DEPT VISIT HI MDM: CPT

## 2023-09-12 PROCEDURE — 82805 BLOOD GASES W/O2 SATURATION: CPT

## 2023-09-12 PROCEDURE — 93005 ELECTROCARDIOGRAM TRACING: CPT

## 2023-09-12 PROCEDURE — 84484 ASSAY OF TROPONIN QUANT: CPT

## 2023-09-12 PROCEDURE — 94644 CONT INHLJ TX 1ST HOUR: CPT

## 2023-09-12 PROCEDURE — 36415 COLL VENOUS BLD VENIPUNCTURE: CPT

## 2023-09-12 PROCEDURE — 99291 CRITICAL CARE FIRST HOUR: CPT | Performed by: EMERGENCY MEDICINE

## 2023-09-12 PROCEDURE — 99239 HOSP IP/OBS DSCHRG MGMT >30: CPT | Performed by: PHYSICIAN ASSISTANT

## 2023-09-12 PROCEDURE — 85025 COMPLETE CBC W/AUTO DIFF WBC: CPT

## 2023-09-12 PROCEDURE — 85730 THROMBOPLASTIN TIME PARTIAL: CPT

## 2023-09-12 PROCEDURE — 80053 COMPREHEN METABOLIC PANEL: CPT

## 2023-09-12 PROCEDURE — 71045 X-RAY EXAM CHEST 1 VIEW: CPT

## 2023-09-12 RX ORDER — PRAMIPEXOLE DIHYDROCHLORIDE 0.25 MG/1
0.25 TABLET ORAL
Status: CANCELLED | OUTPATIENT
Start: 2023-09-12

## 2023-09-12 RX ORDER — HEPARIN SODIUM 5000 [USP'U]/ML
5000 INJECTION, SOLUTION INTRAVENOUS; SUBCUTANEOUS EVERY 8 HOURS SCHEDULED
Status: DISCONTINUED | OUTPATIENT
Start: 2023-09-12 | End: 2023-09-14 | Stop reason: HOSPADM

## 2023-09-12 RX ORDER — LEVOTHYROXINE SODIUM 0.12 MG/1
125 TABLET ORAL DAILY
Status: DISCONTINUED | OUTPATIENT
Start: 2023-09-13 | End: 2023-09-14 | Stop reason: HOSPADM

## 2023-09-12 RX ORDER — QUETIAPINE FUMARATE 25 MG/1
12.5 TABLET, FILM COATED ORAL
Qty: 15 TABLET | Refills: 0 | Status: SHIPPED | OUTPATIENT
Start: 2023-09-12 | End: 2023-09-20

## 2023-09-12 RX ORDER — TORSEMIDE 10 MG/1
5 TABLET ORAL
Status: DISCONTINUED | OUTPATIENT
Start: 2023-09-12 | End: 2023-09-14 | Stop reason: HOSPADM

## 2023-09-12 RX ORDER — PANTOPRAZOLE SODIUM 40 MG/1
40 TABLET, DELAYED RELEASE ORAL 2 TIMES DAILY
Status: DISCONTINUED | OUTPATIENT
Start: 2023-09-12 | End: 2023-09-14 | Stop reason: HOSPADM

## 2023-09-12 RX ORDER — INSULIN LISPRO 100 [IU]/ML
16 INJECTION, SOLUTION INTRAVENOUS; SUBCUTANEOUS
Status: DISCONTINUED | OUTPATIENT
Start: 2023-09-12 | End: 2023-09-14 | Stop reason: HOSPADM

## 2023-09-12 RX ORDER — PANTOPRAZOLE SODIUM 40 MG/1
40 TABLET, DELAYED RELEASE ORAL 2 TIMES DAILY
Status: CANCELLED | OUTPATIENT
Start: 2023-09-12

## 2023-09-12 RX ORDER — DULOXETIN HYDROCHLORIDE 30 MG/1
30 CAPSULE, DELAYED RELEASE ORAL DAILY
Status: CANCELLED | OUTPATIENT
Start: 2023-09-13

## 2023-09-12 RX ORDER — ATORVASTATIN CALCIUM 10 MG/1
10 TABLET, FILM COATED ORAL
Status: DISCONTINUED | OUTPATIENT
Start: 2023-09-12 | End: 2023-09-14 | Stop reason: HOSPADM

## 2023-09-12 RX ORDER — ICOSAPENT ETHYL 1000 MG/1
1 CAPSULE ORAL 2 TIMES DAILY
Status: CANCELLED | OUTPATIENT
Start: 2023-09-12

## 2023-09-12 RX ORDER — AMLODIPINE BESYLATE 5 MG/1
5 TABLET ORAL DAILY
Status: CANCELLED | OUTPATIENT
Start: 2023-09-13

## 2023-09-12 RX ORDER — CRANBERRY FRUIT EXTRACT 200 MG
200 CAPSULE ORAL 3 TIMES DAILY
Status: CANCELLED | OUTPATIENT
Start: 2023-09-12

## 2023-09-12 RX ORDER — GABAPENTIN 300 MG/1
300 CAPSULE ORAL
Status: CANCELLED | OUTPATIENT
Start: 2023-09-12

## 2023-09-12 RX ORDER — SENNOSIDES 8.6 MG
8.6 TABLET ORAL
Status: DISCONTINUED | OUTPATIENT
Start: 2023-09-12 | End: 2023-09-14 | Stop reason: HOSPADM

## 2023-09-12 RX ORDER — SENNOSIDES 8.6 MG
8.6 TABLET ORAL
Status: CANCELLED | OUTPATIENT
Start: 2023-09-12

## 2023-09-12 RX ORDER — FAMOTIDINE 20 MG/1
10 TABLET, FILM COATED ORAL DAILY
Status: DISCONTINUED | OUTPATIENT
Start: 2023-09-13 | End: 2023-09-14 | Stop reason: HOSPADM

## 2023-09-12 RX ORDER — METOPROLOL TARTRATE 50 MG/1
50 TABLET, FILM COATED ORAL EVERY 12 HOURS SCHEDULED
Status: DISCONTINUED | OUTPATIENT
Start: 2023-09-12 | End: 2023-09-14 | Stop reason: HOSPADM

## 2023-09-12 RX ORDER — FERROUS SULFATE 325(65) MG
325 TABLET ORAL
Status: CANCELLED | OUTPATIENT
Start: 2023-09-13

## 2023-09-12 RX ORDER — SODIUM CHLORIDE FOR INHALATION 0.9 %
3 VIAL, NEBULIZER (ML) INHALATION ONCE
Status: COMPLETED | OUTPATIENT
Start: 2023-09-12 | End: 2023-09-12

## 2023-09-12 RX ORDER — ESTRADIOL 0.1 MG/G
0.1 CREAM VAGINAL 3 TIMES WEEKLY
Status: CANCELLED | OUTPATIENT
Start: 2023-09-13

## 2023-09-12 RX ORDER — FAMOTIDINE 10 MG
10 TABLET ORAL DAILY
Qty: 30 TABLET | Refills: 0 | Status: SHIPPED | OUTPATIENT
Start: 2023-09-13 | End: 2023-10-13

## 2023-09-12 RX ORDER — ALBUTEROL SULFATE 90 UG/1
2 AEROSOL, METERED RESPIRATORY (INHALATION) EVERY 6 HOURS PRN
Status: CANCELLED | OUTPATIENT
Start: 2023-09-12

## 2023-09-12 RX ORDER — FERROUS SULFATE 325(65) MG
325 TABLET ORAL
Status: DISCONTINUED | OUTPATIENT
Start: 2023-09-13 | End: 2023-09-14 | Stop reason: HOSPADM

## 2023-09-12 RX ORDER — AMLODIPINE BESYLATE 5 MG/1
5 TABLET ORAL DAILY
Status: DISCONTINUED | OUTPATIENT
Start: 2023-09-13 | End: 2023-09-14 | Stop reason: HOSPADM

## 2023-09-12 RX ORDER — BENZONATATE 100 MG/1
100 CAPSULE ORAL ONCE
Status: COMPLETED | OUTPATIENT
Start: 2023-09-12 | End: 2023-09-12

## 2023-09-12 RX ORDER — MAGNESIUM HYDROXIDE/ALUMINUM HYDROXICE/SIMETHICONE 120; 1200; 1200 MG/30ML; MG/30ML; MG/30ML
30 SUSPENSION ORAL ONCE
Status: COMPLETED | OUTPATIENT
Start: 2023-09-12 | End: 2023-09-12

## 2023-09-12 RX ORDER — QUETIAPINE FUMARATE 25 MG/1
12.5 TABLET, FILM COATED ORAL
Status: DISCONTINUED | OUTPATIENT
Start: 2023-09-12 | End: 2023-09-14 | Stop reason: HOSPADM

## 2023-09-12 RX ORDER — DULOXETIN HYDROCHLORIDE 30 MG/1
30 CAPSULE, DELAYED RELEASE ORAL DAILY
Refills: 0
Start: 2023-09-12

## 2023-09-12 RX ORDER — VITAMIN B COMPLEX
100 TABLET ORAL DAILY
Status: CANCELLED | OUTPATIENT
Start: 2023-09-12

## 2023-09-12 RX ORDER — IPRATROPIUM BROMIDE AND ALBUTEROL SULFATE 2.5; .5 MG/3ML; MG/3ML
3 SOLUTION RESPIRATORY (INHALATION) 3 TIMES DAILY PRN
Status: DISCONTINUED | OUTPATIENT
Start: 2023-09-12 | End: 2023-09-12

## 2023-09-12 RX ORDER — CHOLECALCIFEROL (VITAMIN D3) 125 MCG
100 CAPSULE ORAL DAILY
Status: DISCONTINUED | OUTPATIENT
Start: 2023-09-12 | End: 2023-09-14 | Stop reason: HOSPADM

## 2023-09-12 RX ORDER — LEVALBUTEROL INHALATION SOLUTION 0.63 MG/3ML
0.63 SOLUTION RESPIRATORY (INHALATION) EVERY 8 HOURS PRN
Status: DISCONTINUED | OUTPATIENT
Start: 2023-09-12 | End: 2023-09-12

## 2023-09-12 RX ORDER — ATORVASTATIN CALCIUM 10 MG/1
10 TABLET, FILM COATED ORAL
Status: CANCELLED | OUTPATIENT
Start: 2023-09-12

## 2023-09-12 RX ORDER — LEVOTHYROXINE SODIUM 0.12 MG/1
125 TABLET ORAL DAILY
Status: CANCELLED | OUTPATIENT
Start: 2023-09-13

## 2023-09-12 RX ORDER — FAMOTIDINE 20 MG/1
10 TABLET, FILM COATED ORAL DAILY
Status: CANCELLED | OUTPATIENT
Start: 2023-09-13

## 2023-09-12 RX ORDER — ACETAMINOPHEN 325 MG/1
975 TABLET ORAL EVERY 8 HOURS PRN
Status: DISCONTINUED | OUTPATIENT
Start: 2023-09-12 | End: 2023-09-14 | Stop reason: HOSPADM

## 2023-09-12 RX ORDER — ACETAMINOPHEN 325 MG/1
975 TABLET ORAL EVERY 8 HOURS SCHEDULED
Status: CANCELLED | OUTPATIENT
Start: 2023-09-12

## 2023-09-12 RX ORDER — CRANBERRY FRUIT EXTRACT 200 MG
200 CAPSULE ORAL 3 TIMES DAILY
Status: DISCONTINUED | OUTPATIENT
Start: 2023-09-12 | End: 2023-09-12

## 2023-09-12 RX ORDER — QUETIAPINE FUMARATE 25 MG/1
12.5 TABLET, FILM COATED ORAL
Status: CANCELLED | OUTPATIENT
Start: 2023-09-12

## 2023-09-12 RX ORDER — PREDNISONE 20 MG/1
40 TABLET ORAL DAILY
Status: CANCELLED | OUTPATIENT
Start: 2023-09-13

## 2023-09-12 RX ORDER — ACETAMINOPHEN 325 MG/1
975 TABLET ORAL EVERY 8 HOURS SCHEDULED
Status: DISCONTINUED | OUTPATIENT
Start: 2023-09-12 | End: 2023-09-14

## 2023-09-12 RX ORDER — ALBUTEROL SULFATE 90 UG/1
2 AEROSOL, METERED RESPIRATORY (INHALATION) EVERY 4 HOURS PRN
Status: DISCONTINUED | OUTPATIENT
Start: 2023-09-12 | End: 2023-09-14 | Stop reason: HOSPADM

## 2023-09-12 RX ORDER — TORSEMIDE 10 MG/1
5 TABLET ORAL EVERY OTHER DAY
Status: CANCELLED | OUTPATIENT
Start: 2023-09-12

## 2023-09-12 RX ORDER — CHLORAL HYDRATE 500 MG
1000 CAPSULE ORAL DAILY
Status: DISCONTINUED | OUTPATIENT
Start: 2023-09-13 | End: 2023-09-14 | Stop reason: HOSPADM

## 2023-09-12 RX ORDER — GABAPENTIN 300 MG/1
300 CAPSULE ORAL
Status: DISCONTINUED | OUTPATIENT
Start: 2023-09-12 | End: 2023-09-14 | Stop reason: HOSPADM

## 2023-09-12 RX ORDER — PREDNISONE 20 MG/1
40 TABLET ORAL DAILY
Status: DISCONTINUED | OUTPATIENT
Start: 2023-09-13 | End: 2023-09-14

## 2023-09-12 RX ORDER — ICOSAPENT ETHYL 1000 MG/1
1 CAPSULE ORAL 2 TIMES DAILY
Status: DISCONTINUED | OUTPATIENT
Start: 2023-09-12 | End: 2023-09-12

## 2023-09-12 RX ORDER — METOPROLOL TARTRATE 50 MG/1
50 TABLET, FILM COATED ORAL EVERY 12 HOURS SCHEDULED
Status: CANCELLED | OUTPATIENT
Start: 2023-09-12

## 2023-09-12 RX ORDER — DULOXETIN HYDROCHLORIDE 30 MG/1
30 CAPSULE, DELAYED RELEASE ORAL DAILY
Status: DISCONTINUED | OUTPATIENT
Start: 2023-09-13 | End: 2023-09-14 | Stop reason: HOSPADM

## 2023-09-12 RX ADMIN — PANTOPRAZOLE SODIUM 40 MG: 40 TABLET, DELAYED RELEASE ORAL at 20:44

## 2023-09-12 RX ADMIN — HEPARIN SODIUM 5000 UNITS: 5000 INJECTION INTRAVENOUS; SUBCUTANEOUS at 21:45

## 2023-09-12 RX ADMIN — OMEGA-3 FATTY ACIDS CAP 1000 MG 1000 MG: 1000 CAP at 08:10

## 2023-09-12 RX ADMIN — Medication 100 MG: at 20:44

## 2023-09-12 RX ADMIN — ATORVASTATIN CALCIUM 10 MG: 10 TABLET, FILM COATED ORAL at 19:52

## 2023-09-12 RX ADMIN — DULOXETINE HYDROCHLORIDE 30 MG: 30 CAPSULE, DELAYED RELEASE ORAL at 08:11

## 2023-09-12 RX ADMIN — IPRATROPIUM BROMIDE 1 MG: 0.5 SOLUTION RESPIRATORY (INHALATION) at 15:07

## 2023-09-12 RX ADMIN — GABAPENTIN 300 MG: 300 CAPSULE ORAL at 21:48

## 2023-09-12 RX ADMIN — ALUMINUM HYDROXIDE, MAGNESIUM HYDROXIDE, AND DIMETHICONE 30 ML: 200; 20; 200 SUSPENSION ORAL at 08:33

## 2023-09-12 RX ADMIN — INSULIN LISPRO 2 UNITS: 100 INJECTION, SOLUTION INTRAVENOUS; SUBCUTANEOUS at 08:11

## 2023-09-12 RX ADMIN — Medication 1 TABLET: at 08:10

## 2023-09-12 RX ADMIN — Medication 3 ML: at 15:02

## 2023-09-12 RX ADMIN — METOPROLOL TARTRATE 50 MG: 50 TABLET, FILM COATED ORAL at 08:10

## 2023-09-12 RX ADMIN — ACETAMINOPHEN 975 MG: 325 TABLET, FILM COATED ORAL at 21:48

## 2023-09-12 RX ADMIN — QUETIAPINE FUMARATE 12.5 MG: 25 TABLET ORAL at 21:46

## 2023-09-12 RX ADMIN — ACETAMINOPHEN 975 MG: 325 TABLET, FILM COATED ORAL at 05:10

## 2023-09-12 RX ADMIN — SENNOSIDES 8.6 MG: 8.6 TABLET, FILM COATED ORAL at 21:47

## 2023-09-12 RX ADMIN — FAMOTIDINE 10 MG: 20 TABLET, FILM COATED ORAL at 08:11

## 2023-09-12 RX ADMIN — ALBUTEROL SULFATE 10 MG: 2.5 SOLUTION RESPIRATORY (INHALATION) at 15:02

## 2023-09-12 RX ADMIN — BENZONATATE 100 MG: 100 CAPSULE ORAL at 16:18

## 2023-09-12 RX ADMIN — HEPARIN SODIUM 5000 UNITS: 5000 INJECTION INTRAVENOUS; SUBCUTANEOUS at 05:10

## 2023-09-12 RX ADMIN — INSULIN DETEMIR 25 UNITS: 100 INJECTION, SOLUTION SUBCUTANEOUS at 21:49

## 2023-09-12 RX ADMIN — LEVOTHYROXINE SODIUM 112 MCG: 112 TABLET ORAL at 05:10

## 2023-09-12 RX ADMIN — METOPROLOL TARTRATE 50 MG: 50 TABLET, FILM COATED ORAL at 20:44

## 2023-09-12 RX ADMIN — AMLODIPINE BESYLATE 5 MG: 5 TABLET ORAL at 08:10

## 2023-09-12 RX ADMIN — Medication 250 MG: at 08:11

## 2023-09-12 RX ADMIN — PANTOPRAZOLE SODIUM 40 MG: 40 TABLET, DELAYED RELEASE ORAL at 08:10

## 2023-09-12 RX ADMIN — INSULIN LISPRO 16 UNITS: 100 INJECTION, SOLUTION INTRAVENOUS; SUBCUTANEOUS at 08:11

## 2023-09-12 NOTE — ASSESSMENT & PLAN NOTE
Lab Results   Component Value Date    HGBA1C 7.5 (H) 09/02/2023       Recent Labs     09/11/23  1114 09/11/23  1634 09/11/23  2114 09/12/23  0649   POCGLU 172* 81 230* 212*       Blood Sugar Average: Last 72 hrs:  Patient was on Levemir 50 units and Novolog 16 units at home  Pt has poor appetite. Decreased basal to half. Continue home insulin otherwise.    Continue Basal insulin as 25 units at bedtime  Monitor blood glucose  Monitor for hypoglycemia symptoms

## 2023-09-12 NOTE — ASSESSMENT & PLAN NOTE
· The patient tells me at home she uses a cane however for longer distances, she uses a walker  · She was observed using her rolling walker in the room with a steady gait  · Continue to monitor.   Ambulates with assistance  · I do not believe PT evaluation is needed at this point as she walked well with her walker  · Continue outpatient follow-up with PT

## 2023-09-12 NOTE — DISCHARGE INSTR - AVS FIRST PAGE
Dear Marium Coates,     It was our pleasure to care for you here at [facility]. It is our hope that we were always able to exceed the expected standards for your care during your stay. You were hospitalized due to confusion. You were cared for on the 4th floor by Jennifer Piper PA-C under the service of Rhoda Murphy DO with the [facility] who covers for your primary care physician (PCP), Leeann Munroe MD, while you were hospitalized. If you have any questions or concerns related to this hospitalization, you may contact us at [phone]. For follow up as well as any medication refills, we recommend that you follow up with your primary care physician. A registered nurse will reach out to you by phone within a few days after your discharge to answer any additional questions that you may have after going home. However, at this time we provide for you here, the most important instructions / recommendations at discharge:     Notable Medication Adjustments -   Please been taking Seroquel 12.5 mg at bedtime to help with sleep and also to help with your mental status  Please reduce your Cymbalta dosing to 30 mg daily. Please reduce your Pepcid dosing to 10 mg daily. Testing Required after Discharge -   We will get you set up with formal cognitive testing once you are home and settled in  Important follow up information -   Please follow-up with your family doctor in the next few weeks. You will also be called to set up an appointment with the geriatric doctor. If not please see the following pages to call them and set something up  Please review this entire after visit summary as additional general instructions including medication list, appointments, activity, diet, any pertinent wound care, and other additional recommendations from your care team that may be provided for you.       Sincerely,     Jennifer Piper PA-C

## 2023-09-12 NOTE — ASSESSMENT & PLAN NOTE
Lab Results   Component Value Date    HGBA1C 7.5 (H) 09/02/2023       Recent Labs     09/11/23  1114 09/11/23  1634 09/11/23  2114 09/12/23  0649   POCGLU 172* 81 230* 212*     Blood Sugar Average: Last 72 hrs:  · (P) [id number]   · Insulin-dependent diabetes  · Home regimen Levemir 50 units at bedtime, NovoLog 16 units with breakfast and lunch, 25 units with dinner  · She will be continued on her home insulin regimen  · Accu-Cheks ACHS with correctional scale insulin  · Monitor for hypoglycemia

## 2023-09-12 NOTE — PLAN OF CARE
Problem: MOBILITY - ADULT  Goal: Maintains/Returns to pre admission functional level  Description: INTERVENTIONS:  - Perform BMAT or MOVE assessment daily.   - Set and communicate daily mobility goal to care team and patient/family/caregiver. - Collaborate with rehabilitation services on mobility goals if consulted  - Perform Range of Motion  times a day. - Reposition patient every  hours. - Dangle patient  times a day  - Stand patient  times a day  - Ambulate patient  times a day  - Out of bed to chair  times a day   - Out of bed for meals  times a day  - Out of bed for toileting  - Record patient progress and toleration of activity level   Outcome: Progressing     Problem: SAFETY ADULT  Goal: Maintains/Returns to pre admission functional level  Description: INTERVENTIONS:  - Perform BMAT or MOVE assessment daily.   - Set and communicate daily mobility goal to care team and patient/family/caregiver. - Collaborate with rehabilitation services on mobility goals if consulted  - Perform Range of Motion  times a day. - Reposition patient every  hours.   - Dangle patient  times a day  - Stand patient  times a day  - Ambulate patient  times a day  - Out of bed to chair  times a day   - Out of bed for meals times a day  - Out of bed for toileting  - Record patient progress and toleration of activity level   Outcome: Progressing     Problem: DISCHARGE PLANNING  Goal: Discharge to home or other facility with appropriate resources  Description: INTERVENTIONS:  - Identify barriers to discharge w/patient and caregiver  - Arrange for needed discharge resources and transportation as appropriate  - Identify discharge learning needs (meds, wound care, etc.)  - Arrange for interpretive services to assist at discharge as needed  - Refer to Case Management Department for coordinating discharge planning if the patient needs post-hospital services based on physician/advanced practitioner order or complex needs related to functional status, cognitive ability, or social support system  Outcome: Progressing     Problem: MUSCULOSKELETAL - ADULT  Goal: Maintain or return mobility to safest level of function  Description: INTERVENTIONS:  - Assess patient's ability to carry out ADLs; assess patient's baseline for ADL function and identify physical deficits which impact ability to perform ADLs (bathing, care of mouth/teeth, toileting, grooming, dressing, etc.)  - Assess/evaluate cause of self-care deficits   - Assess range of motion  - Assess patient's mobility  - Assess patient's need for assistive devices and provide as appropriate  - Encourage maximum independence but intervene and supervise when necessary  - Involve family in performance of ADLs  - Assess for home care needs following discharge   - Consider OT consult to assist with ADL evaluation and planning for discharge  - Provide patient education as appropriate  Outcome: Progressing  Goal: Maintain proper alignment of affected body part  Description: INTERVENTIONS:  - Support, maintain and protect limb and body alignment  - Provide patient/ family with appropriate education  Outcome: Progressing

## 2023-09-12 NOTE — ASSESSMENT & PLAN NOTE
Patient presented with shortness of breath and upper airway sounds. Home albuterol did not provide relief. She is on 2-3 L oxygen after admission, but she is not using oxygen at her baseline. Denied fever, chills, chest pain, palpitation. This morning she was on 3 L with saturation 97%. She was saturating 94-95% when O2 was being titrated down to 2 L. She has noisy cough but no phlegm came out that time. No wheezing, rales heard on auscultation  COVID 19 negative      Plan  Continue Duo-neb. O2 Wean off as needed. Goal O2 saturation 88-92%  Consider maintenance therapy as the patient uses rescue inhaler 2 times a day, every day. Pred 40 for 5 days.    Monitor pulse oxymetry when patient moves without oxygen and asked nurse to ambulate the patient to check her mobility with oxygen saturation

## 2023-09-12 NOTE — ASSESSMENT & PLAN NOTE
· Patient brought in from home with reported worsening confusion. The patient was AOx3 on my evaluation. She was able to tell me her name, she knew she was at [facility] and knew she sometimes gets "foggy" which was her definition of being confused. She however was not able to tell me the day/date  · Suspect this is likely multifactorial in the setting of her multiple chronic medical comorbidities and not necessarily related to any acute disease processes  · Also suspect likely underlying dementia. She has not had a formal cognitive evaluation and is planned for referral to [facility] for further evaluation if her mental status did not improve  · Check urinary retention protocol  · We will continue to monitor at this time, supportive care and delirium precautions  · Started on Seroquel 12.5 mg at bedtime  · Melatonin for sleep. Allow for regular sleep/wake cycles.   Avoid pain, avoid hypoglycemia  · Needs geriatric follow-up as an outpatient as well as formal cognitive testing once home

## 2023-09-12 NOTE — CASE MANAGEMENT
Case Management Discharge Planning Note    Patient name Joe Raymond  Location S MS 12/S -01 MRN [id number]  : 1950 Date 2023       Current Admission Date: 2023  Current Admission Diagnosis:AMS (altered mental status)   Patient Active Problem List    Diagnosis Date Noted   • Parenchymal renal hypertension 2023   • Behavioral and psychological symptoms of dementia ([address]) 2023   • Postural dizziness with presyncope 2023   • Varicose veins of both lower extremities with pain 2023   • Palpitations 2023   • Anemia 2023   • Diabetic nephropathy associated with type 2 diabetes mellitus ([address]) 2023   • Recent acute cystitis without hematuria 2023   • Hyponatremia 2023   • Seasonal allergies 2023   • 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 2023   • Hypothyroid 2023   • Urinary incontinence 2022   • Urinary retention 2022   • Osteoporosis 2022   • Chronic constipation 2021   • COPD (chronic obstructive pulmonary disease) ([address]) 2021   • Depression, recurrent ([address]) 2021   • Gastroparesis diabeticorum ([address]) 2021   • B12 neuropathy ([address]) 2021   • Ambulatory dysfunction 10/22/2020   • Memory changes 10/22/2020   • Essential hypertension 2020   • Type 2 diabetes mellitus with diabetic chronic kidney disease ([address]) 2020   • Gastroesophageal reflux disease without esophagitis 2020   • Leukocytosis 2020   • AMS (altered mental status) 2020   • Fibromyalgia 2020   • CKD (chronic kidney disease) stage 4, GFR 15-29 ml/min ([address]) 2020   • Hypertriglyceridemia 2020   • Vitamin D deficiency 2020   • Pulmonary hypertension ([address]) 2019   • Persistent proteinuria 2019   • BARBARA (obstructive sleep apnea) 2019   • RLS (restless legs syndrome) 2019   • Stage 4 chronic kidney disease ([address]) 2018      LOS (days): 3  Geometric Mean LOS (GMLOS) (days): 2.20  Days to GMLOS:-0.9     OBJECTIVE:  Risk of Unplanned Readmission Score: 49.43         Current admission status: Inpatient   Preferred Pharmacy:   [facility]/pharmacy [address] [place] [address], [address] [place] [address]  Phone: [phone] Fax: [phone]    Primary Care Provider: Jose Luis Avalos MD    Primary Insurance: MEDICARE  Secondary Insurance: COMMERCIAL MISCELLANEOUS    DISCHARGE DETAILS:                                                                                        IMM Given (Date):: 09/12/23  IMM Given to[de-identified] Patient        IMM reviewed with patient. Patient agrees with discharge determination. Patient's  is on his way to pick her up. CM reviewed the availability of treatment team to discuss questions or concerns patient and/or family may have regarding  understanding medications and recognizing signs and symptoms once discharged. CM also encouraged patient to follow up with all recommended appointments after discharge. Patient advised of importance for patient and family to participate in managing patient's medical well being.

## 2023-09-12 NOTE — ED PROCEDURE NOTE
Procedure  POC Lung US    Date/Time: 9/12/2023 3:40 PM    Performed by: Fabian Escoto DO  Authorized by: Fabian Escoto DO    Patient location:  ED  Other Assisting Provider: Yes (comment) (Dr. Severiano Medina and Jose Sharp)    Procedure details:     Exam Type:  Educational    Indications: dyspnea      Assessment / Evaluation for:  Pneumothorax, pleural effusion and pneumonia    Structures Visualized: pleural line, rib, left hemithorax and right hemithorax      Exam Type: initial exam      Image quality: non-diagnostic      Image availability:  Video obtained  Left Hemithorax Findings:     Left pleura visualized:  Visualized    Left Hemithorax Findings: normal      Left lung findings: normal interstitium    Right Lung Findings:     Right pleural visualized:  Visualized (1-3 B lines)    Right hemithorax findings: B-line pattern      Right lung findings: normal interstitium    Interpretation:     Findings: normal thoracic ultrasound                       Zora Garcia DO  09/12/23 1542

## 2023-09-12 NOTE — PLAN OF CARE
Problem: PAIN - ADULT  Goal: Verbalizes/displays adequate comfort level or baseline comfort level  Description: Interventions:  - Encourage patient to monitor pain and request assistance  - Assess pain using appropriate pain scale  - Administer analgesics based on type and severity of pain and evaluate response  - Implement non-pharmacological measures as appropriate and evaluate response  - Consider cultural and social influences on pain and pain management  - Notify physician/advanced practitioner if interventions unsuccessful or patient reports new pain  Outcome: Progressing     Problem: INFECTION - ADULT  Goal: Absence of fever/infection during neutropenic period  Description: INTERVENTIONS:  - Monitor WBC    Outcome: Progressing     Problem: SAFETY ADULT  Goal: Maintain or return to baseline ADL function  Description: INTERVENTIONS:  -  Assess patient's ability to carry out ADLs; assess patient's baseline for ADL function and identify physical deficits which impact ability to perform ADLs (bathing, care of mouth/teeth, toileting, grooming, dressing, etc.)  - Assess/evaluate cause of self-care deficits   - Assess range of motion  - Assess patient's mobility; develop plan if impaired  - Assess patient's need for assistive devices and provide as appropriate  - Encourage maximum independence but intervene and supervise when necessary  - Involve family in performance of ADLs  - Assess for home care needs following discharge   - Consider OT consult to assist with ADL evaluation and planning for discharge  - Provide patient education as appropriate  Outcome: Progressing     Problem: DISCHARGE PLANNING  Goal: Discharge to home or other facility with appropriate resources  Description: INTERVENTIONS:  - Identify barriers to discharge w/patient and caregiver  - Arrange for needed discharge resources and transportation as appropriate  - Identify discharge learning needs (meds, wound care, etc.)  - Arrange for interpretive services to assist at discharge as needed  - Refer to Case Management Department for coordinating discharge planning if the patient needs post-hospital services based on physician/advanced practitioner order or complex needs related to functional status, cognitive ability, or social support system  Outcome: Progressing     Problem: Knowledge Deficit  Goal: Patient/family/caregiver demonstrates understanding of disease process, treatment plan, medications, and discharge instructions  Description: Complete learning assessment and assess knowledge base.   Interventions:  - Provide teaching at level of understanding  - Provide teaching via preferred learning methods  Outcome: Progressing     Problem: MOBILITY - ADULT  Goal: Maintain or return to baseline ADL function  Description: INTERVENTIONS:  -  Assess patient's ability to carry out ADLs; assess patient's baseline for ADL function and identify physical deficits which impact ability to perform ADLs (bathing, care of mouth/teeth, toileting, grooming, dressing, etc.)  - Assess/evaluate cause of self-care deficits   - Assess range of motion  - Assess patient's mobility; develop plan if impaired  - Assess patient's need for assistive devices and provide as appropriate  - Encourage maximum independence but intervene and supervise when necessary  - Involve family in performance of ADLs  - Assess for home care needs following discharge   - Consider OT consult to assist with ADL evaluation and planning for discharge  - Provide patient education as appropriate  Outcome: Progressing

## 2023-09-12 NOTE — ED PROCEDURE NOTE
Procedure  POC Cardiac US    Date/Time: 9/12/2023 3:38 PM    Performed by: Ming Bentley DO  Authorized by: Ming Bentley DO    Patient location:  ED  Other Assisting Provider: Yes (comment) (Dr. Yvonne Cosme and Sudhakar Mccauley)    Procedure details:     Exam Type:  Educational    Indications: dyspnea and respiratory distress      Assessment / Evaluation for: pericardial effusion      Exam Type: initial exam      Image quality: non-diagnostic      Image availability:  Video obtained  Patient Details:     Mechanical ventilation: No    Cardiac findings:     Echo technique: limited 2D      Views obtained: parasternal long axis, parasternal short axis, subcostal and apical      Pericardial effusion: trace      Tamponade physiology: absent      Wall motion: normal      LV systolic function: normal      RV dilation: none    Pulmonary findings:     Left Lung Findings: left lung sliding      Right lung findings: right lung sliding      B-lines: 1 to 8000 Viola Acuña DO  09/12/23 1540

## 2023-09-12 NOTE — ASSESSMENT & PLAN NOTE
Patient presented with shortness of breath and upper airway sounds. Home albuterol did not provide relief. Plan  Duo-neb. O2 Wean  Consider maintenance therapy as the patient uses rescue inhaler 2 times a day, every day. Pred 40 for 5 days.  Observation

## 2023-09-12 NOTE — DISCHARGE SUMMARY
[address]  Discharge- Cait Nunez 1950, 75 y.o. female MRN: [id number]  Unit/Bed#: S MS 40084 Encounter: [id number]  Primary Care Provider: Whitney Ferris MD   Date and time admitted to hospital: 9/9/2023  6:17 AM    * AMS (altered mental status)  Assessment & Plan  · Patient brought in from home with reported worsening confusion. The patient was AOx3 on my evaluation. She was able to tell me her name, she knew she was at [facility] and knew she sometimes gets "foggy" which was her definition of being confused. She however was not able to tell me the day/date  · Suspect this is likely multifactorial in the setting of her multiple chronic medical comorbidities and not necessarily related to any acute disease processes  · Also suspect likely underlying dementia. She has not had a formal cognitive evaluation and is planned for referral to [facility] for further evaluation if her mental status did not improve  · Check urinary retention protocol  · We will continue to monitor at this time, supportive care and delirium precautions  · Started on Seroquel 12.5 mg at bedtime  · Melatonin for sleep. Allow for regular sleep/wake cycles. Avoid pain, avoid hypoglycemia  · Needs geriatric follow-up as an outpatient as well as formal cognitive testing once home    CKD (chronic kidney disease) stage 4, GFR 15-29 ml/min ([facility])  Assessment & Plan  · Has a history of CKD stage IV. She is on Demadex 5 mg every other day  · Creatinine on admit 2.59, was 2.07, 3 days ago  · Now back to baseline  · Resumed Demadex  · Trend BMP    Recent acute cystitis without hematuria  Assessment & Plan  · The patient reports history of recurrent UTI  · Recently discharged from the hospital on 8/19/2023 following admission for altered mental status, UTI and ANDRA on CKD  · Was seen in the ED 9/6/2023, and again diagnosed with UTI. Urine cultures positive for Serratia.   Was discharged home on Cefpodoxime  · UA in the ED today was negative for pyuria or bacteriuria. The patient denies any urinary symptoms  · She is afebrile and without leukocytosis, procalcitonin negative  · We will recommend holding off on additional antibiotics at this time as she is being treated on multiple occasions for presumed UTI    Ambulatory dysfunction  Assessment & Plan  · The patient tells me at home she uses a cane however for longer distances, she uses a walker  · She was observed using her rolling walker in the room with a steady gait  · Continue to monitor.   Ambulates with assistance  · I do not believe PT evaluation is needed at this point as she walked well with her walker  · Continue outpatient follow-up with PT    Essential hypertension  Assessment & Plan  · Blood pressure 140-150  · Continue home medications    Type 2 diabetes mellitus with diabetic chronic kidney disease [facility])  Assessment & Plan  Lab Results   Component Value Date    HGBA1C 7.5 (H) 09/02/2023       Recent Labs     09/11/23  1114 09/11/23  1634 09/11/23  2114 09/12/23  0649   POCGLU 172* 81 230* 212*     Blood Sugar Average: Last 72 hrs:  · (P) [id number]   · Insulin-dependent diabetes  · Home regimen Levemir 50 units at bedtime, NovoLog 16 units with breakfast and lunch, 25 units with dinner  · She will be continued on her home insulin regimen  · Accu-Cheks ACHS with correctional scale insulin  · Monitor for hypoglycemia    RLS (restless legs syndrome)  Assessment & Plan  · Continue Mirapex at bedtime      Medical Problems     Resolved Problems  Date Reviewed: 9/12/2023   None       Discharging Physician / Practitioner: Chastity Weston PA-C  PCP: Priscila Dahl MD  Admission Date:   Admission Orders (From admission, onward)     Ordered        09/09/23 0957  INPATIENT ADMISSION  Once                      Discharge Date: 09/12/23    Consultations During Hospital Stay:  · Geriatrics    Procedures Performed:   · none    Significant Findings / Test Results:   · Creatinine 2.5 on admit, 2.0 upon discharge  · UA bland  · Free T4 1.12    Incidental Findings:   · none     Test Results Pending at Discharge (will require follow up):   · none     Outpatient Tests Requested:  · Cognitive eval    Complications:  none    Reason for Admission: confusion    Hospital Course:   Altagracia Lewis is a 75 y.o. female patient who originally presented to the hospital on 9/9/2023 due to altered mental status at home. Patient's  reports that she has been increasingly confused since recent discharge from the hospital for UTI. Has been on antibiotics. Was seen in consultation by geriatrics while hospitalized, recommended to have formal outpatient cognitive testing once returning home. Medically she had ANDRA with a creatinine of 2.5 on admission. We held her diuretics and gave her gentle IV hydration which improved her symptoms. Her creatinine is now back to her baseline. She has been ambulating with a steady gait. At this point she is okay for discharge home with outpatient geriatric medicine follow-up. Please see above list of diagnoses and related plan for additional information. Condition at Discharge: stable    Discharge Day Visit / Exam:   Subjective: Patient having some acid reflux this morning but otherwise her mental status is stable. Wants to go home today. Vitals: Blood Pressure: 146/79 (09/12/23 0741)  Pulse: 68 (09/12/23 0741)  Temperature: 98.3 °F (36.8 °C) (09/12/23 0741)  Temp Source: Oral (09/12/23 0741)  Respirations: 18 (09/12/23 0741)  SpO2: 91 % (09/12/23 0741)  Exam:   Physical Exam  Vitals and nursing note reviewed. Constitutional:       General: She is not in acute distress. Appearance: Normal appearance. HENT:      Head: Normocephalic. Mouth/Throat:      Mouth: Mucous membranes are moist.   Eyes:      General: No scleral icterus. Pupils: Pupils are equal, round, and reactive to light.    Cardiovascular: Rate and Rhythm: Normal rate and regular rhythm. Heart sounds: No murmur heard. Pulmonary:      Effort: Pulmonary effort is normal. No respiratory distress. Breath sounds: Normal breath sounds. No wheezing, rhonchi or rales. Abdominal:      General: Bowel sounds are normal. There is no distension. Palpations: Abdomen is soft. Tenderness: There is no abdominal tenderness. Musculoskeletal:         General: No swelling. Right lower leg: No edema. Left lower leg: No edema. Skin:     Capillary Refill: Capillary refill takes less than 2 seconds. Neurological:      General: No focal deficit present. Mental Status: She is alert and oriented to person, place, and time. Mental status is at baseline. Discussion with Family: Updated  () via phone. Discharge instructions/Information to patient and family:   See after visit summary for information provided to patient and family. Provisions for Follow-Up Care:  See after visit summary for information related to follow-up care and any pertinent home health orders. Disposition:   Home    Planned Readmission: no     Discharge Statement:  I spent 45 minutes discharging the patient. This time was spent on the day of discharge. I had direct contact with the patient on the day of discharge. Greater than 50% of the total time was spent examining patient, answering all patient questions, arranging and discussing plan of care with patient as well as directly providing post-discharge instructions. Additional time then spent on discharge activities. Discharge Medications:  See after visit summary for reconciled discharge medications provided to patient and/or family.       **Please Note: This note may have been constructed using a voice recognition system**

## 2023-09-12 NOTE — H&P
[facility]  H&P  Name: Tee Diaz 75 y.o. female I MRN: [id number]  Unit/Bed#: W -01 I Date of Admission: 9/12/2023   Date of Service: 9/12/2023 I Hospital Day: 0      Assessment/Plan   Diabetic nephropathy associated with type 2 diabetes mellitus [facility])  Assessment & Plan  Lab Results   Component Value Date    HGBA1C 7.5 (H) 09/02/2023       Recent Labs     09/11/23  1114 09/11/23  1634 09/11/23  2114 09/12/23  0649   POCGLU 172* 81 230* 212*       Blood Sugar Average: Last 72 hrs:       Pt has poor appetite. Decreased basal to half. Continue home insulin otherwise. * COPD (chronic obstructive pulmonary disease) ([address])  Assessment & Plan  Patient presented with shortness of breath and upper airway sounds. Home albuterol did not provide relief. Plan  Duo-neb. O2 Wean  Consider maintenance therapy as the patient uses rescue inhaler 2 times a day, every day. Pred 40 for 5 days. Observation        VTE Pharmacologic Prophylaxis: VTE Score: 6 High Risk (Score >/= 5) - Pharmacological DVT Prophylaxis Ordered: heparin. Sequential Compression Devices Ordered. Code Status: Level 3 - DNAR and DNI   Discussion with family: Patient declined call to . Anticipated Length of Stay: Patient will be admitted on an observation basis with an anticipated length of stay of less than 2 midnights secondary to mild symptoms. Chief Complaint: Shortness of Breath    History of Present Illness:  Tee Diaz is a 75 y.o. female with a PMH of CKD, COPD  who presents with acute shortness of breath secondary to COPD exacerbation. Patient was discharged from the hospital today. Once the patient arrived at home patient felt like she was having increased work of breathing and shortness of breath at rest not relieved by albuterol prompted her return to the hospital. Of note patient mentions that she has been using her rescue inhaler twice a day every single day while at home.   In the emergency department she is required 2 L of oxygen. Review of Systems:  Review of Systems   Constitutional: Negative for fever. Respiratory: Positive for cough, shortness of breath and wheezing. Cardiovascular: Negative for chest pain. Gastrointestinal: Negative for abdominal pain, diarrhea, nausea and vomiting.        Past Medical and Surgical History:   Past Medical History:   Diagnosis Date   • Actinic keratosis    • Acute cystitis without hematuria 04/28/2023   • Acute cystitis without hematuria 04/28/2023   • Acute-on-chronic kidney injury [facility])    • ANDRA (acute kidney injury) ([address]) 05/08/2020   • Allergic    • Allergic rhinitis    • Anesthesia     pt reports "had to use double lumen endo tube for hiatal hernia repair/so surgeon could get to where he needed to work"   • Arthritis    • Aspiration into airway    • Ahumada esophagus 1993    Lot of stress with children   • Basal cell carcinoma 2007    left cheek    • BCC (basal cell carcinoma) 05/27/2021    Left Nasal tip   • Breast discharge 06/19/2023   • Breast pain 06/19/2023   • Cancer ([address])     squamous cell cancer on forhead   • Cancer ([address])     basal cell on nose    • Cholelithiasis    • Chronic kidney disease 2000, 2018    Stones, kidney disease stage 4   • Chronic pain disorder     bilat feet and joint pain on occas   • Colon polyp    • Constipation    • COPD (chronic obstructive pulmonary disease) ([address])    • COVID-19 08/2021    recovered at home/did receive monoclonal infusion   • COVID-19 virus infection 08/16/2021   • Dental bridge present    • Depression    • Diabetes mellitus ([address])     Type 2   • Diabetic neuropathy ([address])    • Disease of thyroid gland    • Dyspnea    • Elevated liver enzymes 04/04/2023   • Epigastric abdominal pain with severe diabetic gastroparesis, status post EGD/Botox injection, 5/14 05/17/2021   • Facial abrasion, initial encounter 03/22/2023   • Fall 03/22/2023   • Family history of thyroid problem    • Fatty liver • Fracture of orbital floor, blow-out, right, closed, with routine healing, subsequent encounter 03/22/2023   • GERD (gastroesophageal reflux disease)    • Heart burn    • Hiatal hernia    • History of colon polyps 07/30/2021   • History of kidney stones 09/29/2020    Hx of recurrent kidney stones   • History of kidney stones 09/29/2020    Hx of recurrent kidney stones  Formatting of this note might be different from the original. Hx of recurrent kidney stones  Last Assessment & Plan:  Formatting of this note might be different from the original. We will set her up for follow-up in 3 months with a KUB prior. She knows to call if she has any kidney stone type pain in the meantime. • History of pneumonia    • History of repair of hiatal hernia 05/03/2020   • Hyperlipidemia    • Hyperplasia, parathyroid ([address])    • Hypertension    • Hypotension 04/13/2022   • Kidney problem    • Kidney stone    • Memory loss Julu2 2020   • Motion sickness    • Motion sickness    • Multiple closed fractures of ribs of right side 03/22/2023   • Nasal bones, closed fracture 03/22/2023   • Neck pain    • Obesity 1978   • Obesity (BMI 30.0-34. 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, left     great toe fusion   • Urinary incontinence    • Urinary tract infection 03/28/2022   • Wears glasses        Past Surgical History:   Procedure Laterality Date   • ABDOMINAL SURGERY  [id number],[id number]    Nissen x2 1972 tubal ligarion   • ARTHROSCOPY KNEE     • BREAST BIOPSY      stereotactic-benign   • BREAST BIOPSY      stereo-benign   • BREAST CYST EXCISION Bilateral     benign   • BREAST EXCISIONAL BIOPSY      unknown date-benign   • BREAST EXCISIONAL BIOPSY      unknown date-benign   • BREAST EXCISIONAL BIOPSY      unknown date-benign   • BREAST EXCISIONAL BIOPSY      unknown age-benign   • CARDIAC CATHETERIZATION     • CHOLECYSTECTOMY     • COLONOSCOPY     • EXAMINATION UNDER ANESTHESIA N/A 06/24/2021    Procedure: EXAM UNDER ANESTHESIA (EUA), DISE;  Surgeon: Ana Ferguson MD;  [facility];  Service: ENT   • HERNIA REPAIR     • HIATAL HERNIA REPAIR     • KNEE SURGERY      Torn maniscus lap surg   • LIPOSUCTION     • LYMPH NODE BIOPSY     • MOHS SURGERY  05/20/2021    left mid forehead-Gautam   • MOHS SURGERY Left 05/27/2021    Left nasal tip- gautam    • VA LIGATION/BIOPSY TEMPORAL ARTERY Left 01/05/2023    Procedure: BIOPSY ARTERY TEMPORAL;  Surgeon: Diana Marcus DO;  [facility];  Service: Vascular   • VA OPEN IMPLANTATION CRANIAL NERVE VASQUEZ & PULSE GEN N/A 11/10/2021    Procedure: INSERTION UPPER AIRWAY STIMULATOR, INSPIRE IMPLANT;  Surgeon: Ana Ferguson MD;  [facility];  Service: ENT   • VA UNLISTED PROCEDURE FOOT/TOES Right 07/19/2022    Procedure: 1st metatarsal phalangeal joint fusion;  Surgeon: Lenora Winters DPM;  Location[facility];  Service: Podiatry   • REDUCTION MAMMAPLASTY Bilateral 2000   • REDUCTION MAMMAPLASTY     • SKIN BIOPSY     • SKIN CANCER EXCISION  2007    squamous cell carcinoma    • SKIN CANCER EXCISION  2007    basal cell carcinoma   • SKIN CANCER EXCISION  2023    SCCIS chest   • SQUAMOUS CELL CARCINOMA EXCISION     • TOE SURGERY Right 08/04/2022    Right Great Toe Fusion   • TONSILLECTOMY     • TUBAL LIGATION     • UPPER GASTROINTESTINAL ENDOSCOPY     • US GUIDED BREAST BIOPSY RIGHT COMPLETE Right 07/18/2022       Meds/Allergies:  Prior to Admission medications    Medication Sig Start Date End Date Taking?  Authorizing Provider   acetaminophen (TYLENOL) 325 mg tablet Take 3 tablets (975 mg total) by mouth every 8 (eight) hours 3/23/23  Yes Mela Walker PA-C   albuterol (Ventolin HFA) 90 mcg/act inhaler Inhale 2 puffs every 6 (six) hours as needed for wheezing 8/4/22  Yes Nathan Lopez DO   amLODIPine (NORVASC) 5 mg tablet Take 1 tablet (5 mg total) by mouth daily 5/24/23  Yes Katerine Rhodes MD   B-D ULTRAFINE III SHORT PEN 31G X 8 MM MISC  7/26/19  Yes Historical Provider, MD   Calcium Citrate 1040 MG TABS Take 500 mg by mouth Three times a day 11/16/22  Yes Historical Provider, MD   Coenzyme Q10 (CoQ10) 100 MG CAPS Take 100 mg by mouth in the morning   Yes Historical Provider, MD   Cranberry 200 MG CAPS Take 200 capsules by mouth 3 (three) times a day   Yes Historical Provider, MD   DULoxetine (CYMBALTA) 30 mg delayed release capsule Take 1 capsule (30 mg total) by mouth daily 9/12/23  Yes Bonnie Huynh PA-C   estradiol (ESTRACE VAGINAL) 0.1 mg/g vaginal cream Apply pea sized amount around urethra and inside vaginal opening 3 times weekly 4/12/23  Yes Ceci Ibarra PA-C   famotidine (PEPCID) 10 mg tablet Take 1 tablet (10 mg total) by mouth daily Do not start before September 13, 2023. 9/13/23 10/13/23 Yes Bonnie Huynh PA-C   ferrous sulfate 324 (65 Fe) mg Take 1 tablet (324 mg total) by mouth every other day 8/21/23 11/19/23 Yes Jass Serrano MD   gabapentin (Neurontin) 300 mg capsule Take 1 capsule (300 mg total) by mouth daily at bedtime 3/30/23  Yes Lily Feliz DO   Icosapent Ethyl 1 g CAPS Take 1 capsule by mouth 2 (two) times a day 4/4/23  Yes Historical Provider, MD   insulin aspart (NovoLOG) 100 units/mL injection Inject under the skin 3 (three) times a day before meals 16 units, 16 units, 25 units.    Yes Historical Provider, MD   insulin detemir (Levemir FlexTouch) 100 Units/mL injection pen Inject 50 Units under the skin every evening  6/8/21  Yes Historical Provider, MD   levothyroxine (Synthroid) 125 mcg tablet Take 1 tablet (125 mcg total) by mouth daily 9/11/23  Yes Rm Coates MD Edith   metoprolol tartrate (LOPRESSOR) 50 mg tablet Take 1 tablet (50 mg total) by mouth every 12 (twelve) hours 6/6/23  Yes TC Jones   pantoprazole (PROTONIX) 40 mg tablet TAKE 1 TABLET BY MOUTH TWICE A DAY 10/30/22  Yes Alexandre Vega PA-C   pramipexole (MIRAPEX) 0.25 mg tablet Take 1 tablet (0.25 mg total) by mouth daily at bedtime 2/15/23  Yes Monty Schilder Nocek, CRNP   Probiotic Product (PROBIOTIC PO) Take 1 capsule by mouth 2 (two) times a day   Yes Historical Provider, MD   QUEtiapine (SEROquel) 25 mg tablet Take 0.5 tablets (12.5 mg total) by mouth daily at bedtime 9/12/23 10/12/23 Yes Lakeshia Ryan PA-C   rosuvastatin (CRESTOR) 5 mg tablet Take 1 tablet (5 mg total) by mouth daily 5/24/23  Yes Glenn Hall MD   senna (SENOKOT) 8.6 mg Take 1 tablet (8.6 mg total) by mouth daily at bedtime 8/21/23 11/19/23 Yes Chang Montoya MD   torsemide (DEMADEX) 10 mg tablet Take 0.5 tablets (5 mg total) by mouth every other day Take this medication only if your weight exceeds 172 pounds 8/19/23 12/17/23 Yes Kellie Don DO   cefpodoxime (VANTIN) 100 mg tablet Take 1 tablet (100 mg total) by mouth 2 (two) times a day for 7 days  Patient not taking: Reported on 9/12/2023 9/6/23 9/12/23  Sachin Eden DO   DULoxetine (CYMBALTA) 30 mg delayed release capsule Take 60 mg by mouth daily 9/7/22 9/12/23  Historical Provider, MD   famotidine (PEPCID) 40 MG tablet Take 1 tablet (40 mg total) by mouth daily 8/7/23 9/12/23  Dieter Parra MD   levothyroxine 112 mcg tablet Take 112 mcg by mouth every morning 4/29/23 9/12/23  Historical Provider, MD     I have reviewed home medications with patient personally. Allergies:    Allergies   Allergen Reactions   • Sulfamethoxazole-Trimethoprim Other (See Comments)     Tongue swelling   • Ciprofloxacin Hives and Itching       Social History:  Marital Status: /Civil Union   Occupation: retired  Patient Pre-hospital Living Situation: Home  Patient Pre-hospital Level of Mobility: walks with cane  Patient Pre-hospital Diet Restrictions: diabetic   Substance Use History:   Social History     Substance and Sexual Activity   Alcohol Use Yes    Comment: 1 or 2 a year     Social History     Tobacco Use   Smoking Status Former   • Packs/day: 2.00   • Years: 10.00   • Total pack years: 20.00   • Types: Cigarettes   • Start date: 1968   • Quit date: 1976   • Years since quittin.7   • Passive exposure: Past   Smokeless Tobacco Never   Tobacco Comments    Smoked 2 pack a day     Social History     Substance and Sexual Activity   Drug Use No       Family History:  Family History   Problem Relation Age of Onset   • Heart disease Mother    • Depression Mother    • Hypertension Mother    • COPD Mother    • Hearing loss Mother    • Anxiety disorder Mother    • Heart disease Father    • Lung cancer Father 79        Smoker    • Cancer Father         brain   • Alcohol abuse Father    • Dementia Father    • Hypertension Father    • Thyroid disease Father    • COPD Father    • Arthritis Father    • Brain cancer Father 76   • Hypertension Sister    • Diabetes Sister    • Heart disease Sister    • Thyroid disease Sister    • Cancer Sister         Lympoma, lung   • Lung cancer Sister 78   • Anxiety disorder Sister    • Hypertension Brother    • Diabetes Brother    • Cancer Brother         Throat   • Dementia Brother    • Stroke Brother    • Hypertension Brother    • Heart disease Brother    • Diabetes Brother    • Hypertension Brother    • No Known Problems Son    • No Known Problems Son    • Brain cancer Paternal Aunt         unknown age   • Diabetes Sister    • Hypertension Sister    • Diabetes Brother    • Breast cancer Neg Hx        Physical Exam:     Vitals:   Blood Pressure: 148/83 (23)  Pulse: 79 (23 183)  Temperature: 98.4 °F (36.9 °C) (23)  Temp Source: Oral (23 1703)  Respirations: 18 (23)  SpO2: 97 % (09/12/23 1831)    Physical Exam  Vitals and nursing note reviewed. Constitutional:       Appearance: Normal appearance. HENT:      Head: Normocephalic and atraumatic. Cardiovascular:      Rate and Rhythm: Normal rate and regular rhythm. Pulses: Normal pulses. Heart sounds: Normal heart sounds. Pulmonary:      Effort: Pulmonary effort is normal. No respiratory distress. Breath sounds: Wheezing present. Comments: Transmitted upper airway sounds    Abdominal:      General: Bowel sounds are normal. There is no distension. Palpations: Abdomen is soft. Tenderness: There is no abdominal tenderness. There is no guarding. Musculoskeletal:      Right lower leg: No edema. Left lower leg: No edema. Skin:     General: Skin is warm and dry. Capillary Refill: Capillary refill takes less than 2 seconds. Neurological:      General: No focal deficit present. Mental Status: She is alert and oriented to person, place, and time.          Additional Data:     Lab Results:  Results from last 7 days   Lab Units 09/12/23  1507   WBC Thousand/uL 14.33*   HEMOGLOBIN g/dL 11.9   HEMATOCRIT % 36.3   PLATELETS Thousands/uL 277   NEUTROS PCT % 78*   LYMPHS PCT % 13*   MONOS PCT % 6   EOS PCT % 2     Results from last 7 days   Lab Units 09/12/23  1507   SODIUM mmol/L 138   POTASSIUM mmol/L 3.7   CHLORIDE mmol/L 104   CO2 mmol/L 25   BUN mg/dL 22   CREATININE mg/dL 1.95*   ANION GAP mmol/L 9   CALCIUM mg/dL 8.7   ALBUMIN g/dL 4.0   TOTAL BILIRUBIN mg/dL 0.36   ALK PHOS U/L 62   ALT U/L 21   AST U/L 19   GLUCOSE RANDOM mg/dL 118     Results from last 7 days   Lab Units 09/12/23  1507   INR  0.85     Results from last 7 days   Lab Units 09/12/23  0649 09/11/23  2114 09/11/23  1634 09/11/23  1114 09/11/23  0711 09/10/23  2041 09/10/23  1601 09/10/23  1122 09/10/23  0627 09/09/23  2041 09/09/23  1547 09/09/23  1115   POC GLUCOSE mg/dl 212* 230* 81 172* 176* 175* 155* 71 114 158* 191* 240* Results from last 7 days   Lab Units 09/12/23  1507 09/09/23  0706   LACTIC ACID mmol/L 1.2 1.8   PROCALCITONIN ng/ml 0.12 0.09       Lines/Drains:  Invasive Devices     Peripheral Intravenous Line  Duration           Peripheral IV 09/12/23 Right Antecubital <1 day                    Imaging: Reviewed radiology reports from this admission including: chest xray  XR chest 1 view portable   Final Result by Hever Vargas MD (09/12 9326)      No acute cardiopulmonary disease. Workstation performed: [id number]             EKG and Other Studies Reviewed on Admission:   · EKG: NSR. HR 73.    ** Please Note: This note has been constructed using a voice recognition system.  **

## 2023-09-12 NOTE — PHYSICAL THERAPY NOTE
PHYSICAL THERAPY EVALUATION  DATE: 09/12/23  TIME: 2277-2081    NAME:  Anna Fuentes  AGE:   75 y.o.   Mrn:   [id number]  Length Of Stay: 3    ADMIT DX:  Dehydration [E86.0]  Confusion [R41.0]  UTI (urinary tract infection) [N39.0]  Altered mental status [R41.82]  ANDRA (acute kidney injury) ([address]) [N17.9]    Past Medical History:   Diagnosis Date    Actinic keratosis     Acute cystitis without hematuria 04/28/2023    Acute cystitis without hematuria 04/28/2023    Acute-on-chronic kidney injury ([address])     ANDRA (acute kidney injury) ([address]) 05/08/2020    Allergic     Allergic rhinitis     Anesthesia     pt reports "had to use double lumen endo tube for hiatal hernia repair/so surgeon could get to where he needed to work"    Arthritis     Aspiration into airway     Ahumada esophagus 1993    Lot of stress with children    Basal cell carcinoma 2007    left cheek     BCC (basal cell carcinoma) 05/27/2021    Left Nasal tip    Breast discharge 06/19/2023    Breast pain 06/19/2023    Cancer ([address])     squamous cell cancer on forhead    Cancer ([address])     basal cell on nose     Cholelithiasis     Chronic kidney disease 2000, 2018    Stones, kidney disease stage 4    Chronic pain disorder     bilat feet and joint pain on occas    Colon polyp     Constipation     COPD (chronic obstructive pulmonary disease) ([address])     COVID-19 08/2021    recovered at home/did receive monoclonal infusion    COVID-19 virus infection 08/16/2021    Dental bridge present     Depression     Diabetes mellitus ([address])     Type 2    Diabetic neuropathy ([address])     Disease of thyroid gland     Dyspnea     Elevated liver enzymes 04/04/2023    Epigastric abdominal pain with severe diabetic gastroparesis, status post EGD/Botox injection, 5/14 05/17/2021    Facial abrasion, initial encounter 03/22/2023    Fall 03/22/2023    Family history of thyroid problem     Fatty liver     Fracture of orbital floor, blow-out, right, closed, with routine healing, subsequent encounter 03/22/2023    GERD (gastroesophageal reflux disease)     Heart burn     Hiatal hernia     History of colon polyps 07/30/2021    History of kidney stones 09/29/2020    Hx of recurrent kidney stones    History of kidney stones 09/29/2020    Hx of recurrent kidney stones  Formatting of this note might be different from the original. Hx of recurrent kidney stones  Last Assessment & Plan:  Formatting of this note might be different from the original. We will set her up for follow-up in 3 months with a KUB prior. She knows to call if she has any kidney stone type pain in the meantime.     History of pneumonia     History of repair of hiatal hernia 05/03/2020    Hyperlipidemia     Hyperplasia, parathyroid ([address])     Hypertension     Hypotension 04/13/2022    Kidney problem     Kidney stone     Memory loss Julu2 2020    Motion sickness     Motion sickness     Multiple closed fractures of ribs of right side 03/22/2023    Nasal bones, closed fracture 03/22/2023    Neck pain     Obesity 1978    Obesity (BMI 30.0-34.9)     Other chest pain 09/13/2021    Pollen allergies     RLS (restless legs syndrome)     S/P foot surgery 07/20/2022    SCC (squamous cell carcinoma) 05/04/2021    left mid forehead    SCC (squamous cell carcinoma) 2023    chest    Seasonal allergies     Sleep apnea     has inspire    Squamous cell skin cancer 2007    left cheek     Swollen ankles     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, left     great toe fusion    Urinary incontinence     Urinary tract infection 03/28/2022    Wears glasses      Past Surgical History:   Procedure Laterality Date    ABDOMINAL SURGERY  [id number]    Nissen x2 1972 tubal ligarion    ARTHROSCOPY KNEE      BREAST BIOPSY      stereotactic-benign    BREAST BIOPSY      stereo-benign    BREAST CYST EXCISION Bilateral     benign    BREAST EXCISIONAL BIOPSY      unknown date-benign    BREAST EXCISIONAL BIOPSY      unknown date-benign    BREAST EXCISIONAL BIOPSY      unknown date-benign BREAST EXCISIONAL BIOPSY      unknown age-benign    CARDIAC CATHETERIZATION      CHOLECYSTECTOMY      COLONOSCOPY      EXAMINATION UNDER ANESTHESIA N/A 06/24/2021    Procedure: EXAM UNDER ANESTHESIA (EUA), DISE;  Surgeon: Linda Beard MD;  Location: [facility];  Service: ENT    HERNIA REPAIR      HIATAL HERNIA REPAIR      KNEE SURGERY      Torn maniscus lap surg    LIPOSUCTION      LYMPH NODE BIOPSY      MOHS SURGERY  05/20/2021    left mid forehead-Gautam    MOHS SURGERY Left 05/27/2021    Left nasal tip- gautam     ID LIGATION/BIOPSY TEMPORAL ARTERY Left 01/05/2023    Procedure: BIOPSY ARTERY TEMPORAL;  Surgeon: Brock Swanson DO;  [facility];  Service: Vascular    ID OPEN IMPLANTATION CRANIAL NERVE VASQUEZ & PULSE GEN N/A 11/10/2021    Procedure: INSERTION UPPER AIRWAY STIMULATOR, INSPIRE IMPLANT;  Surgeon: Linda Beard MD;  Location: [facility];  Service: ENT    ID UNLISTED PROCEDURE FOOT/TOES Right 07/19/2022    Procedure: 1st metatarsal phalangeal joint fusion;  Surgeon: Carlos Rodriguez DPM;  Location: [facility];  Service: [address] [place] [address] Bilateral 2000    REDUCTION MAMMAPLASTY      SKIN BIOPSY      SKIN CANCER EXCISION  2007    squamous cell carcinoma     SKIN CANCER EXCISION  2007    basal cell carcinoma    SKIN CANCER EXCISION  2023    SCCIS chest    SQUAMOUS CELL CARCINOMA EXCISION      TOE SURGERY Right 08/04/2022    Right Great Toe Fusion    TONSILLECTOMY      TUBAL LIGATION      UPPER GASTROINTESTINAL ENDOSCOPY      US GUIDED BREAST BIOPSY RIGHT COMPLETE Right 07/18/2022       Performed at least 2 patient identifiers during session: Name, Eric Zaragoza, and Epic photo     09/12/23 0855   PT Last Visit   PT Visit Date 09/12/23   Note Type   Note type Evaluation   Pain Assessment   Pain Assessment Tool 0-10   Pain Score 5   Pain Location/Orientation Orientation: Right;Location: Knee   Effect of Pain on Daily Activities affects comfort, no affect on mobility   Patient's Stated Pain Goal No pain   Hospital Pain Intervention(s) Ambulation/increased activity;Repositioned; Emotional support   Restrictions/Precautions   Weight Bearing Precautions Per Order No   Other Precautions Cognitive; Chair Alarm; Bed Alarm;Pain   Home Living   Type of [address] Two level; Able to live on main level with bedroom/bathroom  (0 TRISTIN)   Bathroom Shower/Tub Walk-in shower   Bathroom Toilet Raised   Bathroom Equipment Built-in shower seat   Bathroom Accessibility Accessible via walker   Home Equipment Cane;Walker; Other (Comment)  (rollator; transport WC)   Prior Function   Level of [place] Independent with ADLs; Independent with functional mobility; Needs assistance with IADLS   Lives With Spouse; Son   [address] Help From Family   IADLs Family/Friend/Other provides transportation; Family/Friend/Other provides meals; Family/Friend/Other provides medication management  (pt reports needing A for cooking and meds in last month)   Falls in the last 6 months 1 to 4  (3 falls)   Vocational Retired  (Retired respiratory therapist)   Comments At baseline, pt amb with rollator with MARIELA. Uses transport WC for extended community distances. Uses SPC for Christian due to inaccessible environment; requires A with use of SPC. Requires A on elevations to access Christian. Pt reports her spouse is home with pt at all times but son works extended hours outside the home. General   Additional Pertinent History Pt admitted on 9/9/2023 with altered mental status worsening despite abx for UTI. PMH is significant for skin CA, DM, diabetic neuropathy, HTN, CKD, UTI.    Family/Caregiver Present No   Cognition   Overall Cognitive Status Impaired   Arousal/Participation Alert   Orientation Level Oriented to person;Oriented to place;Oriented to situation;Disoriented to time  (oriented to month; disoriented to date; disoriented to year, but able to correct with cueing)   Memory Decreased short term memory;Decreased recall of recent events;Decreased recall of precautions   Following Commands Follows multistep commands with increased time or repetition   Comments Pt is questioable historian t/o session; pt reports knowing she feels "off" and "foggy", especially when she has a UTI; Pt displayed interest in receiving therapy for her "memory" as well, defer to OT cognitive evaluation. Subjective   Subjective "I feel better than I did before"   RUE Assessment   RUE Assessment WFL  (observed grossly during functional mobility)   LUE Assessment   LUE Assessment WFL  (observed grossly during functional mobility)   RLE Assessment   RLE Assessment WFL  (observed grossly during functional mobility)   LLE Assessment   LLE Assessment WFL  (observed grossly during functional mobility)   Vision-Basic Assessment   Current Vision Wears glasses all the time   Coordination   Movements are Fluid and Coordinated 1   Sensation WFL   Proprioception   RLE Proprioception Grossly intact   LLE Proprioception Grossly Intact   Five Times Sit To Stand   Time (Seconds) 18.2 Seconds  (Modified 5xSTS, pt use of arms on surface)   Self Selected Walking Speed   SSWS Trial 1 (Seconds) 6.57 Seconds   Bed Mobility   Supine to Sit 6  Modified independent   Additional items Increased time required;HOB elevated   Sit to Supine   (NT as pt was left sitting EOB following session)   Transfers   Sit to Stand 6  Modified independent   Additional items Increased time required;Armrests   Stand to Sit 6  Modified independent   Additional items Increased time required;Armrests   Ambulation/Elevation   Gait pattern Excessively slow; Step through pattern; Foward flexed; Short stride   Gait Assistance 6  Modified independent   Assistive Device Rolling walker   Distance 50 ft   Balance   Static Sitting Good   Dynamic Sitting Fair +   Static Standing Fair +  (w RW)   Dynamic Standing Fair  (w RW)   Ambulatory Fair  (w RW)   Endurance Deficit   Endurance Deficit Yes   Endurance Deficit Description mild SOB noted following session; SpO2 89-91% on room air with activity   Activity Tolerance   Activity Tolerance Patient tolerated treatment well   Medical Staff Made Aware Spoke with CM   Nurse Made Aware Spoke with JAYSHREE Henderson   Assessment   Prognosis Good   Problem List Decreased endurance; Impaired balance;Decreased cognition; Impaired judgement;Decreased safety awareness;Pain   Assessment Pt is a 75 y.o. female  that presents 9/9/2023 with worsening mental status despite abx for UTI, dx AMS (altered mental status). PMH is significant for DM, HTN, cancer history and/or treatment and CKD . Pt is a good candidate for skilled PT services due to accessible home environment and A at home, but with limiting factors of decreased endurance, pain, decreased safety awareness, decreased cognition, impaired balance, and hx of falls. During initial evaluation, pt required MARIELA for bed mobility; MARIELA for transfers with use of armrests. Required MARIELA for amb of 50 ft with use of RW. Pt walks with rollator at baseline. Gait speed measured at 0.8 m/s, indicating pt is a community ambulator, though would not be able to safely cross the street. Modified 5xSTS of 18.2 sec, indicating pt is at an increased risk of falls as compared to age matched peers. Pt presents at/near their baseline level of functional mobility and continued PT services during admission are not required. At this time, PT is recommending OPPT upon d/c to address higher level balance deficits associated with pt reports of feeling "unsteady" while amb and seen in present gait deviations. Recommend frequent bouts of independent/MARIELA ambulation with RW in room during remainder of her hospitalization.    Barriers to Discharge None   Goals   Patient Goals "to go home"   PT Treatment Day 0   Recommendation   PT Discharge Recommendation Home with outpatient rehabilitation   AM-PAC Basic Mobility Inpatient   Turning in Flat Bed Without Bedrails 4   Lying on Back to Sitting on Edge of Flat Bed Without Bedrails 4   Moving Bed to Chair 4   Standing Up From Chair Using Arms 4   Walk in Room 4   Climb 3-5 Stairs With Railing 2   Basic Mobility Inpatient Raw Score 22   Basic Mobility Standardized Score 47.4   Highest Level Of Mobility   -HLM Goal 7: Walk 25 feet or more   JH-HLM Achieved 7: Walk 25 feet or more   Barthel Index   Feeding 10   Bathing 5   Grooming Score 5   Dressing Score 5   Bladder Score 5   Bowels Score 10   Toilet Use Score 10   Transfers (Bed/Chair) Score 15   Mobility (Level Surface) Score 0   Stairs Score 5   Barthel Index Score 70   End of Consult   Patient Position at End of Consult Seated edge of bed; All needs within reach;Bed/Chair alarm activated     The patient's AM-PAC Basic Mobility Inpatient Short Form Raw Score is 20. A Raw score of greater than 16 suggests the patient may benefit from discharge to home. Please also refer to the recommendation of the Physical Therapist for safe discharge planning. 5xSTS: Becky et al [address] [place] [address]: 2.3 sec  Age Norms:  60-69: 11.1 sec  70-79: 12.6 sec  80-89: 14.8 sec  Cut off score;  All date taken from [facility] Neuro Section or Rehab Measures    Gait Speed Interpretation:  Gain of 0.1 m/s is a predictor of well-being in those w/ abnormal walking speed compared to age matched peers  <0.7m/s is at an increased risk for falls  Household ambulator: <0.4 m/s  Limited community ambulator: 0.4-0.8 m/s  [employer] ambulator: 0.8-1.2 m/s  Able to safely cross streets: >1.2 m/s    [employer], ALDA  09/12/23

## 2023-09-12 NOTE — Clinical Note
Case was discussed with LIBRADO and the patient's admission status was agreed to be Admission Status: observation status to the service of Dr. Padmini Jeffers .

## 2023-09-12 NOTE — ED PROVIDER NOTES
History  Chief Complaint   Patient presents with   • Wheezing     Patient here for eval of wheezing and decreased oxygen that started today after being discharged from the hospital. Pt states she typically doesn't wear oxygen at home. 91% RA.      74 yo F h/o COPD, DM, hyperlipidemia, hypertension, GERD, CKD, who presents to the emergency department with shortness of breath and wheezing. Patient was discharged this morning from hospital due to UTI. Patient told primary admitting team that she wanted to go home today. Patient states she felt mildly short of breath while here in the hospital but shortness of breath worsened after being home. Patient notes associated cough with green/yellow sputum. No fevers, chills, chest pain, abdominal pain, nausea, vomiting, leg swelling. Prior to Admission Medications   Prescriptions Last Dose Informant Patient Reported? Taking?    B-D ULTRAFINE III SHORT PEN 31G X 8 MM MISC 9/12/2023 Self Yes Yes   Calcium Citrate 1040 MG TABS 9/12/2023 Self Yes Yes   Sig: Take 500 mg by mouth Three times a day   Coenzyme Q10 (CoQ10) 100 MG CAPS 9/11/2023 Self Yes Yes   Sig: Take 100 mg by mouth in the morning   Cranberry 200 MG CAPS 9/11/2023 Self Yes Yes   Sig: Take 200 capsules by mouth 3 (three) times a day   DULoxetine (CYMBALTA) 30 mg delayed release capsule 9/11/2023  No Yes   Sig: Take 1 capsule (30 mg total) by mouth daily   Icosapent Ethyl 1 g CAPS 9/11/2023 Self Yes Yes   Sig: Take 1 capsule by mouth 2 (two) times a day   Probiotic Product (PROBIOTIC PO) 9/11/2023 Self Yes Yes   Sig: Take 1 capsule by mouth 2 (two) times a day   QUEtiapine (SEROquel) 25 mg tablet 9/11/2023  No Yes   Sig: Take 0.5 tablets (12.5 mg total) by mouth daily at bedtime   acetaminophen (TYLENOL) 325 mg tablet Past Week Self No Yes   Sig: Take 3 tablets (975 mg total) by mouth every 8 (eight) hours   albuterol (Ventolin HFA) 90 mcg/act inhaler 9/12/2023 Self No Yes   Sig: Inhale 2 puffs every 6 (six) hours as needed for wheezing   amLODIPine (NORVASC) 5 mg tablet 9/11/2023 Self No Yes   Sig: Take 1 tablet (5 mg total) by mouth daily   cefpodoxime (VANTIN) 100 mg tablet Not Taking  No No   Sig: Take 1 tablet (100 mg total) by mouth 2 (two) times a day for 7 days   Patient not taking: Reported on 9/12/2023   estradiol (ESTRACE VAGINAL) 0.1 mg/g vaginal cream 9/11/2023 Self No Yes   Sig: Apply pea sized amount around urethra and inside vaginal opening 3 times weekly   famotidine (PEPCID) 10 mg tablet 9/11/2023  No Yes   Sig: Take 1 tablet (10 mg total) by mouth daily Do not start before September 13, 2023. ferrous sulfate 324 (65 Fe) mg 9/11/2023  No Yes   Sig: Take 1 tablet (324 mg total) by mouth every other day   gabapentin (Neurontin) 300 mg capsule 9/11/2023 Self No Yes   Sig: Take 1 capsule (300 mg total) by mouth daily at bedtime   insulin aspart (NovoLOG) 100 units/mL injection 9/11/2023 Self Yes Yes   Sig: Inject under the skin 3 (three) times a day before meals 16 units, 16 units, 25 units.    insulin detemir (Levemir FlexTouch) 100 Units/mL injection pen 9/11/2023 Self Yes Yes   Sig: Inject 50 Units under the skin every evening    levothyroxine (Synthroid) 125 mcg tablet 9/11/2023  No Yes   Sig: Take 1 tablet (125 mcg total) by mouth daily   metoprolol tartrate (LOPRESSOR) 50 mg tablet 9/11/2023 Self No Yes   Sig: Take 1 tablet (50 mg total) by mouth every 12 (twelve) hours   pantoprazole (PROTONIX) 40 mg tablet 9/11/2023 Self No Yes   Sig: TAKE 1 TABLET BY MOUTH TWICE A DAY   pramipexole (MIRAPEX) 0.25 mg tablet 9/11/2023 Self No Yes   Sig: Take 1 tablet (0.25 mg total) by mouth daily at bedtime   rosuvastatin (CRESTOR) 5 mg tablet 9/11/2023 Self No Yes   Sig: Take 1 tablet (5 mg total) by mouth daily   senna (SENOKOT) 8.6 mg 9/11/2023  No Yes   Sig: Take 1 tablet (8.6 mg total) by mouth daily at bedtime   torsemide (DEMADEX) 10 mg tablet 9/11/2023  No Yes   Sig: Take 0.5 tablets (5 mg total) by mouth every other day Take this medication only if your weight exceeds 172 pounds      Facility-Administered Medications: None       Past Medical History:   Diagnosis Date   • Actinic keratosis    • Acute cystitis without hematuria 04/28/2023   • Acute cystitis without hematuria 04/28/2023   • Acute-on-chronic kidney injury [facility])    • ANDRA (acute kidney injury) ([address]) 05/08/2020   • Allergic    • Allergic rhinitis    • Anesthesia     pt reports "had to use double lumen endo tube for hiatal hernia repair/so surgeon could get to where he needed to work"   • Arthritis    • Aspiration into airway    • Ahumada esophagus 1993    Lot of stress with children   • Basal cell carcinoma 2007    left cheek    • BCC (basal cell carcinoma) 05/27/2021    Left Nasal tip   • Breast discharge 06/19/2023   • Breast pain 06/19/2023   • Cancer ([address])     squamous cell cancer on forhead   • Cancer ([address])     basal cell on nose    • Cholelithiasis    • Chronic kidney disease 2000, 2018    Stones, kidney disease stage 4   • Chronic pain disorder     bilat feet and joint pain on occas   • Colon polyp    • Constipation    • COPD (chronic obstructive pulmonary disease) ([address])    • COVID-19 08/2021    recovered at home/did receive monoclonal infusion   • COVID-19 virus infection 08/16/2021   • Dental bridge present    • Depression    • Diabetes mellitus ([address])     Type 2   • Diabetic neuropathy ([address])    • Disease of thyroid gland    • Dyspnea    • Elevated liver enzymes 04/04/2023   • Epigastric abdominal pain with severe diabetic gastroparesis, status post EGD/Botox injection, 5/14 05/17/2021   • Facial abrasion, initial encounter 03/22/2023   • Fall 03/22/2023   • Family history of thyroid problem    • Fatty liver    • Fracture of orbital floor, blow-out, right, closed, with routine healing, subsequent encounter 03/22/2023   • GERD (gastroesophageal reflux disease)    • Heart burn    • Hiatal hernia    • History of colon polyps 07/30/2021   • History of kidney stones 09/29/2020    Hx of recurrent kidney stones   • History of kidney stones 09/29/2020    Hx of recurrent kidney stones  Formatting of this note might be different from the original. Hx of recurrent kidney stones  Last Assessment & Plan:  Formatting of this note might be different from the original. We will set her up for follow-up in 3 months with a KUB prior. She knows to call if she has any kidney stone type pain in the meantime. • History of pneumonia    • History of repair of hiatal hernia 05/03/2020   • Hyperlipidemia    • Hyperplasia, parathyroid ([address])    • Hypertension    • Hypotension 04/13/2022   • Kidney problem    • Kidney stone    • Memory loss Julu2 2020   • Motion sickness    • Motion sickness    • Multiple closed fractures of ribs of right side 03/22/2023   • Nasal bones, closed fracture 03/22/2023   • Neck pain    • Obesity 1978   • Obesity (BMI 30.0-34. 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, left     great toe fusion   • Urinary incontinence    • Urinary tract infection 03/28/2022   • Wears glasses        Past Surgical History:   Procedure Laterality Date   • ABDOMINAL SURGERY  [id number]    Nissen x2 1972 tubal ligarion   • ARTHROSCOPY KNEE     • BREAST BIOPSY      stereotactic-benign   • BREAST BIOPSY      stereo-benign   • BREAST CYST EXCISION Bilateral     benign   • BREAST EXCISIONAL BIOPSY      unknown date-benign   • BREAST EXCISIONAL BIOPSY      unknown date-benign   • BREAST EXCISIONAL BIOPSY      unknown date-benign   • BREAST EXCISIONAL BIOPSY      unknown age-benign   • CARDIAC CATHETERIZATION     • CHOLECYSTECTOMY     • COLONOSCOPY     • EXAMINATION UNDER ANESTHESIA N/A 06/24/2021    Procedure: EXAM UNDER ANESTHESIA (EUA), DISE;  Surgeon: Fozia Casper MD;  [facility];  Service: ENT   • HERNIA REPAIR     • HIATAL HERNIA REPAIR     • KNEE SURGERY      Torn maniscus lap surg   • LIPOSUCTION     • LYMPH NODE BIOPSY     • MOHS SURGERY  05/20/2021    left mid forehead-Gautam   • MOHS SURGERY Left 05/27/2021    Left nasal tip- gautam    • SC LIGATION/BIOPSY TEMPORAL ARTERY Left 01/05/2023    Procedure: BIOPSY ARTERY TEMPORAL;  Surgeon: Arnav Donovan DO;  [facility];  Service: Vascular   • SC OPEN IMPLANTATION CRANIAL NERVE VASQUEZ & PULSE GEN N/A 11/10/2021    Procedure: INSERTION UPPER AIRWAY STIMULATOR, INSPIRE IMPLANT;  Surgeon: Fozia Casper MD;  [facility];  Service: ENT   • SC UNLISTED PROCEDURE FOOT/TOES Right 07/19/2022    Procedure: 1st metatarsal phalangeal joint fusion;  Surgeon: Fina Boogie DPM;  [facility];  Service: Podiatry   • REDUCTION MAMMAPLASTY Bilateral 2000   • REDUCTION MAMMAPLASTY     • SKIN BIOPSY     • SKIN CANCER EXCISION  2007    squamous cell carcinoma    • SKIN CANCER EXCISION  2007    basal cell carcinoma   • SKIN CANCER EXCISION  2023    SCCIS chest   • SQUAMOUS CELL CARCINOMA EXCISION     • TOE SURGERY Right 08/04/2022    Right Great Toe Fusion   • TONSILLECTOMY     • TUBAL LIGATION     • UPPER GASTROINTESTINAL ENDOSCOPY     • US GUIDED BREAST BIOPSY RIGHT COMPLETE Right 07/18/2022       Family History   Problem Relation Age of Onset   • Heart disease Mother    • Depression Mother    • Hypertension Mother    • COPD Mother    • Hearing loss Mother    • Anxiety disorder Mother    • Heart disease Father    • Lung cancer Father 79        Smoker    • Cancer Father         brain   • Alcohol abuse Father    • Dementia Father    • Hypertension Father    • Thyroid disease Father    • COPD Father    • Arthritis Father    • Brain cancer Father 76   • Hypertension Sister    • Diabetes Sister    • Heart disease Sister    • Thyroid disease Sister    • Cancer Sister         Lympoma, lung   • Lung cancer Sister 78   • Anxiety disorder Sister    • Hypertension Brother    • Diabetes Brother    • Cancer Brother         Throat   • Dementia Brother    • Stroke Brother    • Hypertension Brother    • Heart disease Brother    • Diabetes Brother    • Hypertension Brother    • No Known Problems Son    • No Known Problems Son    • Brain cancer Paternal Aunt         unknown age   • Diabetes Sister    • Hypertension Sister    • Diabetes Brother    • Breast cancer Neg Hx      I have reviewed and agree with the history as documented. E-Cigarette/Vaping   • E-Cigarette Use Never User      E-Cigarette/Vaping Substances   • Nicotine No    • THC No    • CBD No    • Flavoring No    • Other No    • Unknown No      Social History     Tobacco Use   • Smoking status: Former     Packs/day: 2.00     Years: 10.00     Total pack years: 20.00     Types: Cigarettes     Start date: 1968     Quit date: 1976     Years since quittin.7     Passive exposure: Past   • Smokeless tobacco: Never   • Tobacco comments:     Smoked 2 pack a day   Vaping Use   • Vaping Use: Never used   Substance Use Topics   • Alcohol use: Yes     Comment: 1 or 2 a year   • Drug use: No        Review of Systems   Constitutional: Negative for chills and fever. HENT: Negative for ear pain and sore throat. Eyes: Negative for pain and visual disturbance. Respiratory: Positive for cough, shortness of breath and wheezing. Cardiovascular: Negative for chest pain and palpitations. Gastrointestinal: Negative for abdominal pain, constipation, diarrhea, nausea and vomiting. Genitourinary: Negative for dysuria and hematuria. Musculoskeletal: Negative for arthralgias and back pain. Skin: Negative for color change and rash. Neurological: Negative for seizures and syncope.    All other systems reviewed and are negative. Physical Exam  ED Triage Vitals [09/12/23 1451]   Temperature Pulse Respirations Blood Pressure SpO2   98.8 °F (37.1 °C) 77 (!) 24 (!) 177/85 95 %      Temp Source Heart Rate Source Patient Position - Orthostatic VS BP Location FiO2 (%)   Oral Monitor Sitting Right arm --      Pain Score       --             Orthostatic Vital Signs  Vitals:    09/12/23 1451 09/12/23 1703   BP: (!) 177/85 144/70   Pulse: 77 76   Patient Position - Orthostatic VS: Sitting Sitting       Physical Exam  Vitals and nursing note reviewed. Constitutional:       General: She is in acute distress (d/t SOB). Appearance: She is well-developed. HENT:      Head: Normocephalic and atraumatic. Eyes:      Conjunctiva/sclera: Conjunctivae normal.   Cardiovascular:      Rate and Rhythm: Normal rate and regular rhythm. Pulses: Normal pulses. Heart sounds: No murmur heard. Pulmonary:      Effort: Tachypnea, prolonged expiration and respiratory distress (mild) present. Breath sounds: No stridor. Wheezing (throughout all lung fields) present. No rhonchi or rales. Abdominal:      Palpations: Abdomen is soft. Tenderness: There is no abdominal tenderness. Musculoskeletal:         General: No swelling. Cervical back: Neck supple. Right lower leg: No edema. Left lower leg: No edema. Skin:     General: Skin is warm and dry. Capillary Refill: Capillary refill takes less than 2 seconds. Neurological:      Mental Status: She is alert.    Psychiatric:         Mood and Affect: Mood normal.         ED Medications  Medications   albuterol inhalation solution 10 mg (10 mg Nebulization Given 9/12/23 1502)     And   ipratropium (ATROVENT) 0.02 % inhalation solution 1 mg (1 mg Nebulization Given 9/12/23 1507)     And   sodium chloride 0.9 % inhalation solution 3 mL (3 mL Nebulization Given 9/12/23 1502)   benzonatate (TESSALON PERLES) capsule 100 mg (100 mg Oral Given 9/12/23 1618) Diagnostic Studies  Results Reviewed     Procedure Component Value Units Date/Time    HS Troponin I 2hr [[id number]] Collected: 09/12/23 1716    Lab Status: In process Specimen: Blood from Arm, Right Updated: 09/12/23 1719    FLU/RSV/COVID - if FLU/RSV clinically relevant [[id number]]  (Normal) Collected: 09/12/23 1543    Lab Status: Final result Specimen: Nares from Nose Updated: 09/12/23 1630     SARS-CoV-2 Negative     INFLUENZA A PCR Negative     INFLUENZA B PCR Negative     RSV PCR Negative    Narrative:      FOR PEDIATRIC PATIENTS - copy/paste COVID Guidelines URL to browser: [email or web address]    SARS-CoV-2 assay is a Nucleic Acid Amplification assay intended for the  qualitative detection of nucleic acid from SARS-CoV-2 in nasopharyngeal  swabs. Results are for the presumptive identification of SARS-CoV-2 RNA. Positive results are indicative of infection with SARS-CoV-2, the virus  causing COVID-19, but do not rule out bacterial infection or co-infection  with other viruses. Laboratories within the [facility] and its  territories are required to report all positive results to the appropriate  public health authorities. Negative results do not preclude SARS-CoV-2  infection and should not be used as the sole basis for treatment or other  patient management decisions. Negative results must be combined with  clinical observations, patient history, and epidemiological information. This test has not been FDA cleared or approved. This test has been authorized by FDA under an Emergency Use Authorization  (EUA). This test is only authorized for the duration of time the  declaration that circumstances exist justifying the authorization of the  emergency use of an in vitro diagnostic tests for detection of SARS-CoV-2  virus and/or diagnosis of COVID-19 infection under section 564(b)(1) of  the Act, 21 U. S.C. 652RMO-2(C)(6), unless the authorization is terminated  or revoked sooner. The test has been validated but independent review by FDA  and CLIA is pending. Test performed using [employer] Gene[employer]pert: This RT-PCR assay targets N2,  a region unique to SARS-CoV-2. A conserved region in the E-gene was chosen  for pan-Sarbecovirus detection which includes SARS-CoV-2. According to CMS-2020-01-R, this platform meets the definition of high-throughput technology. HS Troponin I 4hr [[id number]]     Lab Status: No result Specimen: Blood     Procalcitonin [[id number]]  (Normal) Collected: 09/12/23 1507    Lab Status: Final result Specimen: Blood from Arm, Right Updated: 09/12/23 1548     Procalcitonin 0.12 ng/ml     HS Troponin 0hr (reflex protocol) [[id number]]  (Normal) Collected: 09/12/23 1507    Lab Status: Final result Specimen: Blood from Arm, Right Updated: 09/12/23 1545     hs TnI 0hr 5 ng/L     Protime-INR [[id number]]  (Normal) Collected: 09/12/23 1507    Lab Status: Final result Specimen: Blood from Arm, Right Updated: 09/12/23 1539     Protime 12.2 seconds      INR 0.85    APTT [[id number]]  (Normal) Collected: 09/12/23 1507    Lab Status: Final result Specimen: Blood from Arm, Right Updated: 09/12/23 1539     PTT 27 seconds     Lactic acid [[id number]]  (Normal) Collected: 09/12/23 1507    Lab Status: Final result Specimen: Blood from Arm, Right Updated: 09/12/23 1537     LACTIC ACID 1.2 mmol/L     Narrative:      Result may be elevated if tourniquet was used during collection.     Comprehensive metabolic panel [[id number]]  (Abnormal) Collected: 09/12/23 1507    Lab Status: Final result Specimen: Blood from Arm, Right Updated: 09/12/23 1537     Sodium 138 mmol/L      Potassium 3.7 mmol/L      Chloride 104 mmol/L      CO2 25 mmol/L      ANION GAP 9 mmol/L      BUN 22 mg/dL      Creatinine 1.95 mg/dL      Glucose 118 mg/dL      Calcium 8.7 mg/dL      AST 19 U/L      ALT 21 U/L      Alkaline Phosphatase 62 U/L      Total Protein 6.8 g/dL      Albumin 4.0 g/dL Total Bilirubin 0.36 mg/dL      eGFR 24 ml/min/1.73sq m     Narrative:      National Kidney Disease Foundation guidelines for Chronic Kidney Disease (CKD):   •  Stage 1 with normal or high GFR (GFR > 90 mL/min/1.73 square meters)  •  Stage 2 Mild CKD (GFR = 60-89 mL/min/1.73 square meters)  •  Stage 3A Moderate CKD (GFR = 45-59 mL/min/1.73 square meters)  •  Stage 3B Moderate CKD (GFR = 30-44 mL/min/1.73 square meters)  •  Stage 4 Severe CKD (GFR = 15-29 mL/min/1.73 square meters)  •  Stage 5 End Stage CKD (GFR <15 mL/min/1.73 square meters)  Note: GFR calculation is accurate only with a steady state creatinine    Blood gas, Venous [[id number]] Collected: 09/12/23 1507    Lab Status: Final result Specimen: Blood from Arm, Right Updated: 09/12/23 1530     pH, Tato 7.368     pCO2, Tato 43.3 mm Hg      pO2, Tato 38.6 mm Hg      HCO3, Tato 24.4 mmol/L      Base Excess, Tato -1.0 mmol/L      O2 Content, Tato 12.5 ml/dL      O2 HGB, VENOUS 70.6 %     CBC and differential [[id number]]  (Abnormal) Collected: 09/12/23 1507    Lab Status: Final result Specimen: Blood from Arm, Right Updated: 09/12/23 1524     WBC 14.33 Thousand/uL      RBC 4.10 Million/uL      Hemoglobin 11.9 g/dL      Hematocrit 36.3 %      MCV 89 fL      MCH 29.0 pg      MCHC 32.8 g/dL      RDW 17.4 %      MPV 10.5 fL      Platelets 538 Thousands/uL      nRBC 0 /100 WBCs      Neutrophils Relative 78 %      Immat GRANS % 1 %      Lymphocytes Relative 13 %      Monocytes Relative 6 %      Eosinophils Relative 2 %      Basophils Relative 0 %      Neutrophils Absolute 11.16 Thousands/µL      Immature Grans Absolute 0.12 Thousand/uL      Lymphocytes Absolute 1.91 Thousands/µL      Monocytes Absolute 0.79 Thousand/µL      Eosinophils Absolute 0.29 Thousand/µL      Basophils Absolute 0.06 Thousands/µL     Blood culture #1 [[id number]] Collected: 09/12/23 1507    Lab Status:  In process Specimen: Blood from Arm, Left Updated: 09/12/23 1513    Blood culture #2 [[id number]] Collected: 09/12/23 1507    Lab Status: In process Specimen: Blood from Arm, Right Updated: 09/12/23 1513                 XR chest 1 view portable   Final Result by Emmy Levin MD (09/12 3816)      No acute cardiopulmonary disease. Workstation performed: [id number]               Procedures  ECG 12 Lead Documentation Only    Date/Time: 9/12/2023 3:30 PM    Performed by: Olaf Lai DO  Authorized by: Olaf Lai DO    Patient location:  ED  Previous ECG:     Previous ECG:  Compared to current    Comparison ECG info:  9/10/23: NSR with 1st degree AV block    Similarity:  No change  Interpretation:     Interpretation: abnormal    Rate:     ECG rate:  73    ECG rate assessment: normal    Rhythm:     Rhythm: sinus rhythm and A-V block    Ectopy:     Ectopy: none    QRS:     QRS axis:  Normal    QRS intervals:  Normal  Conduction:     Conduction: abnormal      Abnormal conduction: 1st degree    ST segments:     ST segments:  Normal  T waves:     T waves: normal            ED Course  ED Course as of 09/12/23 1741   Tue Sep 12, 2023   1500 Blood Pressure(!): 177/85   1500 Temperature: 98.8 °F (37.1 °C)   1500 Pulse: 77   1500 Respirations(!): 24   1500 SpO2: 95 %   1500 74 yo F h/o COPD, DM, hyperlipidemia, hypertension, GERD, CKD, who presents to the emergency department with shortness of breath and wheezing. Patient was discharged this morning from hospital due to UTI. Patient told primary admitting team that she wanted to go home today. Patient states she felt mildly short of breath while here in the hospital but shortness of breath worsened after being home. Patient notes associated cough with green/yellow sputum. No fevers, chills, chest pain, abdominal pain, nausea, vomiting, leg swelling. Physical exam: Acute distress due to shortness of breath, audible wheezing in the room, tachypneic.   Heart is regular rate and rhythm, lungs diffuse wheezing throughout all lung fields. Abdomen soft nontender. No lower extremity edema. Concern for: COPD exacerbation versus pneumonia versus ACS    1525 With audible wheezing and room will initiate heart neb and reevaluate. 1531 CBC and differential(!)  Leukocytosis 14.33. Could be acute phase reactant due to acute shortness of breath versus infectious process. Hemoglobin within normal limits. Normal platelets. Otherwise unremarkable. 1531 Blood gas, Venous  7.368/43.3/38.6/24.4   1537 Comprehensive metabolic panel(!)  Electrolytes within normal limits. BUN and creatinine are baseline. Normal ALT and AST. Otherwise unremarkable. 1538 LACTIC ACID: 1.2   1540 PROTIME: 12.2   1540 POCT INR: 0.85   1540 PTT: 27   1601 Procalcitonin: 0.12   1601 hs TnI 0hr: 5  EKG does not show any acute ischemic changes. 1601 XR chest 1 view portable  IMPRESSION:     No acute cardiopulmonary disease. 1633 SARS-COV-2: Negative   1633 INFLU A PCR: Negative   1633 INFLU B PCR: Negative   1633 RSV PCR: Negative   1633 On reeval, patient feels improved however still has mild bleeding and expiratory wheezes. 42-33-24-12 with admitting team, agrees with admission for observation. Relayed to patient and , they were agreeable with plan. Results were discussed with patient and . They expressed understanding. Patient and  were given the opportunity ask questions the emergency department. All question concerns were addressed the emergency department. Medical Decision Making  See ED course for more details on MDM    Amount and/or Complexity of Data Reviewed  Labs: ordered. Decision-making details documented in ED Course. Radiology: ordered. Decision-making details documented in ED Course. Risk  Prescription drug management. Decision regarding hospitalization.             Disposition  Final diagnoses:   COPD exacerbation ([address])   Wheezing   Cough   Leukocytosis     Time reflects when diagnosis was documented in both MDM as applicable and the Disposition within this note     Time User Action Codes Description Comment    9/12/2023  4:39 PM Steele Finical Add [J44.1] COPD exacerbation ([address])     9/12/2023  4:39 PM Steele Finical Add [R06.2] Wheezing     9/12/2023  4:39 PM Steele Finical Add [R05.9] Cough     9/12/2023  4:39 PM Steele Finical Add [Q26.681] Leukocytosis       ED Disposition     ED Disposition   Admit    Condition   Stable    Date/Time   Tue Sep 12, 2023  4:41 PM    Comment   Case was discussed with LIBRADO and the patient's admission status was agreed to be Admission Status: observation status to the service of Dr. Sukumar Briggs    None         Patient's Medications   Discharge Prescriptions    No medications on file     No discharge procedures on file. PDMP Review       Value Time User    PDMP Reviewed  Yes 3/24/2023  9:59 PM Melissa Cueto MD           ED Provider  Attending physically available and evaluated Snoia Silva. I managed the patient along with the ED Attending.     Electronically Signed by         Nhung Solorio DO  09/12/23 [id number]

## 2023-09-13 ENCOUNTER — TRANSITIONAL CARE MANAGEMENT (OUTPATIENT)
Dept: INTERNAL MEDICINE CLINIC | Facility: CLINIC | Age: 73
End: 2023-09-13

## 2023-09-13 LAB
GLUCOSE SERPL-MCNC: 131 MG/DL (ref 65–140)
GLUCOSE SERPL-MCNC: 221 MG/DL (ref 65–140)
GLUCOSE SERPL-MCNC: 255 MG/DL (ref 65–140)

## 2023-09-13 PROCEDURE — 99232 SBSQ HOSP IP/OBS MODERATE 35: CPT | Performed by: INTERNAL MEDICINE

## 2023-09-13 PROCEDURE — 82948 REAGENT STRIP/BLOOD GLUCOSE: CPT

## 2023-09-13 RX ORDER — GUAIFENESIN 600 MG/1
600 TABLET, EXTENDED RELEASE ORAL EVERY 12 HOURS SCHEDULED
Status: DISCONTINUED | OUTPATIENT
Start: 2023-09-13 | End: 2023-09-14 | Stop reason: HOSPADM

## 2023-09-13 RX ORDER — BENZONATATE 100 MG/1
100 CAPSULE ORAL 3 TIMES DAILY
Status: DISCONTINUED | OUTPATIENT
Start: 2023-09-13 | End: 2023-09-14 | Stop reason: HOSPADM

## 2023-09-13 RX ORDER — LANOLIN ALCOHOL/MO/W.PET/CERES
3 CREAM (GRAM) TOPICAL
Status: DISCONTINUED | OUTPATIENT
Start: 2023-09-13 | End: 2023-09-14 | Stop reason: HOSPADM

## 2023-09-13 RX ADMIN — INSULIN LISPRO 16 UNITS: 100 INJECTION, SOLUTION INTRAVENOUS; SUBCUTANEOUS at 07:59

## 2023-09-13 RX ADMIN — LEVOTHYROXINE SODIUM 125 MCG: 125 TABLET ORAL at 09:25

## 2023-09-13 RX ADMIN — HEPARIN SODIUM 5000 UNITS: 5000 INJECTION INTRAVENOUS; SUBCUTANEOUS at 06:59

## 2023-09-13 RX ADMIN — SENNOSIDES 8.6 MG: 8.6 TABLET, FILM COATED ORAL at 21:11

## 2023-09-13 RX ADMIN — FERROUS SULFATE TAB 325 MG (65 MG ELEMENTAL FE) 325 MG: 325 (65 FE) TAB at 09:23

## 2023-09-13 RX ADMIN — BENZONATATE 100 MG: 100 CAPSULE ORAL at 16:36

## 2023-09-13 RX ADMIN — ATORVASTATIN CALCIUM 10 MG: 10 TABLET, FILM COATED ORAL at 16:36

## 2023-09-13 RX ADMIN — OMEGA-3 FATTY ACIDS CAP 1000 MG 1000 MG: 1000 CAP at 09:27

## 2023-09-13 RX ADMIN — METOPROLOL TARTRATE 50 MG: 50 TABLET, FILM COATED ORAL at 09:26

## 2023-09-13 RX ADMIN — ACETAMINOPHEN 975 MG: 325 TABLET, FILM COATED ORAL at 21:10

## 2023-09-13 RX ADMIN — PANTOPRAZOLE SODIUM 40 MG: 40 TABLET, DELAYED RELEASE ORAL at 09:25

## 2023-09-13 RX ADMIN — PREDNISONE 40 MG: 20 TABLET ORAL at 09:26

## 2023-09-13 RX ADMIN — INSULIN DETEMIR 25 UNITS: 100 INJECTION, SOLUTION SUBCUTANEOUS at 21:09

## 2023-09-13 RX ADMIN — METOPROLOL TARTRATE 50 MG: 50 TABLET, FILM COATED ORAL at 21:10

## 2023-09-13 RX ADMIN — HEPARIN SODIUM 5000 UNITS: 5000 INJECTION INTRAVENOUS; SUBCUTANEOUS at 13:23

## 2023-09-13 RX ADMIN — GABAPENTIN 300 MG: 300 CAPSULE ORAL at 21:09

## 2023-09-13 RX ADMIN — DULOXETINE HYDROCHLORIDE 30 MG: 30 CAPSULE, DELAYED RELEASE ORAL at 09:26

## 2023-09-13 RX ADMIN — QUETIAPINE FUMARATE 12.5 MG: 25 TABLET ORAL at 21:10

## 2023-09-13 RX ADMIN — ACETAMINOPHEN 975 MG: 325 TABLET, FILM COATED ORAL at 13:23

## 2023-09-13 RX ADMIN — GUAIFENESIN 600 MG: 600 TABLET ORAL at 21:09

## 2023-09-13 RX ADMIN — INSULIN LISPRO 16 UNITS: 100 INJECTION, SOLUTION INTRAVENOUS; SUBCUTANEOUS at 16:36

## 2023-09-13 RX ADMIN — HEPARIN SODIUM 5000 UNITS: 5000 INJECTION INTRAVENOUS; SUBCUTANEOUS at 21:10

## 2023-09-13 RX ADMIN — AMLODIPINE BESYLATE 5 MG: 5 TABLET ORAL at 09:24

## 2023-09-13 RX ADMIN — BENZONATATE 100 MG: 100 CAPSULE ORAL at 21:10

## 2023-09-13 RX ADMIN — Medication 100 MG: at 09:23

## 2023-09-13 RX ADMIN — PANTOPRAZOLE SODIUM 40 MG: 40 TABLET, DELAYED RELEASE ORAL at 18:28

## 2023-09-13 RX ADMIN — INSULIN LISPRO 16 UNITS: 100 INJECTION, SOLUTION INTRAVENOUS; SUBCUTANEOUS at 12:39

## 2023-09-13 RX ADMIN — FAMOTIDINE 10 MG: 20 TABLET ORAL at 09:24

## 2023-09-13 NOTE — ASSESSMENT & PLAN NOTE
Per her  she has some baseline dementia. She just discharged from hospital after being treated for altered mental status  Last night the night doctor received call for confusion but he could not find any obvious new confusion  This morning she was alert, oriented to place where she is and she could tell her name . She could not mention the year .     Plan:  Monitor for AMS  Continue seroquil 12.5 mg daily at bedtime as her home medication  She is on Duloxetine for depression  Give melatonin as needed for sleep

## 2023-09-13 NOTE — ASSESSMENT & PLAN NOTE
She has H/O hypertension and has been on amlodipine, torsemide and metoprolol tartrate 50 mg at home  Today BP is 151/84    Plan:  Continue home medication   Monitor BP  Follow-up with PCP

## 2023-09-13 NOTE — ASSESSMENT & PLAN NOTE
Per her  she has some baseline dementia. She just discharged from hospital after being treated for altered mental status  Last night the night doctor received call for confusion but he could not find any obvious new confusion  This morning she was alert, oriented to place where she is and she could tell her name . She could not mention the year .     Plan:  Monitor for AMS  Continue seroquil 12.5 mg daily at bedtime as her home medication  Continue duloxetine for depression  Low up with PCP

## 2023-09-13 NOTE — PLAN OF CARE
Problem: MOBILITY - ADULT  Goal: Maintain or return to baseline ADL function  Description: INTERVENTIONS:  -  Assess patient's ability to carry out ADLs; assess patient's baseline for ADL function and identify physical deficits which impact ability to perform ADLs (bathing, care of mouth/teeth, toileting, grooming, dressing, etc.)  - Assess/evaluate cause of self-care deficits   - Assess range of motion  - Assess patient's mobility; develop plan if impaired  - Assess patient's need for assistive devices and provide as appropriate  - Encourage maximum independence but intervene and supervise when necessary  - Involve family in performance of ADLs  - Assess for home care needs following discharge   - Consider OT consult to assist with ADL evaluation and planning for discharge  - Provide patient education as appropriate  Outcome: Progressing  Goal: Maintains/Returns to pre admission functional level  Description: INTERVENTIONS:  - Perform BMAT or MOVE assessment daily.   - Set and communicate daily mobility goal to care team and patient/family/caregiver.    - Collaborate with rehabilitation services on mobility goals if consulted  - Out of bed for toileting  - Record patient progress and toleration of activity level   Outcome: Progressing     Problem: Prexisting or High Potential for Compromised Skin Integrity  Goal: Skin integrity is maintained or improved  Description: INTERVENTIONS:  - Identify patients at risk for skin breakdown  - Assess and monitor skin integrity  - Assess and monitor nutrition and hydration status  - Monitor labs   - Assess for incontinence   - Turn and reposition patient  - Assist with mobility/ambulation  - Relieve pressure over bony prominences  - Avoid friction and shearing  - Provide appropriate hygiene as needed including keeping skin clean and dry  - Evaluate need for skin moisturizer/barrier cream  - Collaborate with interdisciplinary team   - Patient/family teaching  - Consider wound care consult   Outcome: Progressing     Problem: INFECTION - ADULT  Goal: Absence or prevention of progression during hospitalization  Description: INTERVENTIONS:  - Assess and monitor for signs and symptoms of infection  - Monitor lab/diagnostic results  - Monitor all insertion sites, i.e. indwelling lines, tubes, and drains  - Monitor endotracheal if appropriate and nasal secretions for changes in amount and color  - Catarina appropriate cooling/warming therapies per order  - Administer medications as ordered  - Instruct and encourage patient and family to use good hand hygiene technique  - Identify and instruct in appropriate isolation precautions for identified infection/condition  Outcome: Progressing  Goal: Absence of fever/infection during neutropenic period  Description: INTERVENTIONS:  - Monitor WBC    Outcome: Progressing     Problem: SAFETY ADULT  Goal: Maintain or return to baseline ADL function  Description: INTERVENTIONS:  -  Assess patient's ability to carry out ADLs; assess patient's baseline for ADL function and identify physical deficits which impact ability to perform ADLs (bathing, care of mouth/teeth, toileting, grooming, dressing, etc.)  - Assess/evaluate cause of self-care deficits   - Assess range of motion  - Assess patient's mobility; develop plan if impaired  - Assess patient's need for assistive devices and provide as appropriate  - Encourage maximum independence but intervene and supervise when necessary  - Involve family in performance of ADLs  - Assess for home care needs following discharge   - Consider OT consult to assist with ADL evaluation and planning for discharge  - Provide patient education as appropriate  Outcome: Progressing  Goal: Maintains/Returns to pre admission functional level  Description: INTERVENTIONS:  - Perform BMAT or MOVE assessment daily.   - Set and communicate daily mobility goal to care team and patient/family/caregiver.    - Collaborate with rehabilitation services on mobility goals if consulted  - Out of bed for toileting  - Record patient progress and toleration of activity level   Outcome: Progressing  Goal: Patient will remain free of falls  Description: INTERVENTIONS:  - Educate patient/family on patient safety including physical limitations  - Instruct patient to call for assistance with activity   - Consult OT/PT to assist with strengthening/mobility   - Keep Call bell within reach  - Keep bed low and locked with side rails adjusted as appropriate  - Keep care items and personal belongings within reach  - Initiate and maintain comfort rounds  - Make Fall Risk Sign visible to staff  - Initiate/Maintain bed alar- Apply yellow socks and bracelet for high fall risk patients  - Consider moving patient to room near nurses station  Outcome: Progressing     Problem: DISCHARGE PLANNING  Goal: Discharge to home or other facility with appropriate resources  Description: INTERVENTIONS:  - Identify barriers to discharge w/patient and caregiver  - Arrange for needed discharge resources and transportation as appropriate  - Identify discharge learning needs (meds, wound care, etc.)  - Arrange for interpretive services to assist at discharge as needed  - Refer to Case Management Department for coordinating discharge planning if the patient needs post-hospital services based on physician/advanced practitioner order or complex needs related to functional status, cognitive ability, or social support system  Outcome: Progressing     Problem: RESPIRATORY - ADULT  Goal: Achieves optimal ventilation and oxygenation  Description: INTERVENTIONS:  - Assess for changes in respiratory status  - Assess for changes in mentation and behavior  - Position to facilitate oxygenation and minimize respiratory effort  - Oxygen administered by appropriate delivery if ordered  - Initiate smoking cessation education as indicated  - Encourage broncho-pulmonary hygiene including cough, deep breathe, Incentive Spirometry  - Assess the need for suctioning and aspirate as needed  - Assess and instruct to report SOB or any respiratory difficulty  - Respiratory Therapy support as indicated  Outcome: Progressing     Problem: MUSCULOSKELETAL - ADULT  Goal: Maintain or return mobility to safest level of function  Description: INTERVENTIONS:  - Assess patient's ability to carry out ADLs; assess patient's baseline for ADL function and identify physical deficits which impact ability to perform ADLs (bathing, care of mouth/teeth, toileting, grooming, dressing, etc.)  - Assess/evaluate cause of self-care deficits   - Assess range of motion  - Assess patient's mobility  - Assess patient's need for assistive devices and provide as appropriate  - Encourage maximum independence but intervene and supervise when necessary  - Involve family in performance of ADLs  - Assess for home care needs following discharge   - Consider OT consult to assist with ADL evaluation and planning for discharge  - Provide patient education as appropriate  Outcome: Progressing  Goal: Maintain proper alignment of affected body part  Description: INTERVENTIONS:  - Support, maintain and protect limb and body alignment  - Provide patient/ family with appropriate education  Outcome: Progressing

## 2023-09-13 NOTE — PROGRESS NOTES
[address] [facility]  Progress Note  Name: Jeanna Das I  MRN: [id number]  Unit/Bed#: W -60 I Date of Admission: 9/12/2023   Date of Service: 9/13/2023 I Hospital Day: 0    Assessment/Plan   * COPD (chronic obstructive pulmonary disease) [facility])  Assessment & Plan  Patient presented with shortness of breath and upper airway sounds. Home albuterol did not provide relief. She is on 2-3 L oxygen after admission, but she is not using oxygen at her baseline. Denied fever, chills, chest pain, palpitation. This morning she was on 3 L with saturation 97%. She was saturating 94-95% when O2 was being titrated down to 2 L. She has noisy cough but no phlegm came out that time. No wheezing, rales heard on auscultation  COVID 19 negative      Plan  Continue Duo-neb. O2 Wean off as needed. Goal O2 saturation 88-92%  Consider maintenance therapy as the patient uses rescue inhaler 2 times a day, every day. Pred 40 for 5 days. Monitor pulse oxymetry when patient moves without oxygen and asked nurse to ambulate the patient to check her mobility with oxygen saturation    Behavioral and psychological symptoms of dementia [facility])  Assessment & Plan  Per her  she has some baseline dementia. She just discharged from hospital after being treated for altered mental status  Last night the night doctor received call for confusion but he could not find any obvious new confusion  This morning she was alert, oriented to place where she is and she could tell her name . She could not mention the year .     Plan:  Monitor for AMS  Continue seroquil 12.5 mg daily at bedtime as her home medication  She is on Duloxetine for depression  Give melatonin as needed for sleep    Diabetic nephropathy associated with type 2 diabetes mellitus [facility])  Assessment & Plan  Lab Results   Component Value Date    HGBA1C 7.5 (H) 09/02/2023       Recent Labs     09/12/23  0649 09/12/23  2130 09/13/23  0758 09/13/23  1238   POCGLU 212* 152* 131 255*       Blood Sugar Average: Last 72 hrs:  (P) [id number] was on Levemir 50 units and Novolog 16 units at home  Pt has poor appetite. Decreased basal to half. Continue home insulin otherwise. Continue Basal insulin as 25 units at bedtime  Monitor blood glucose  Monitor for hypoglycemia symptoms    Hypothyroid  Assessment & Plan  She has H/O hypothyroidism and is on levothyroxine 125 mcg daily    Plan:  Continue levothyroxine 125 mcg daily    Essential hypertension  Assessment & Plan  She has H/O hypertension and has been on amlodipine, torsemide and metoprolol tartrate 50 mg at home  Today BP is 140/75    Plan:  Continue home medication   Monitor BP    Stage 4 chronic kidney disease [facility])  Assessment & Plan  Lab Results   Component Value Date    EGFR 24 09/12/2023    EGFR 24 09/11/2023    EGFR 20 09/10/2023    CREATININE 1.95 (H) 09/12/2023    CREATININE 2.00 (H) 09/11/2023    CREATININE 2.24 (H) 09/10/2023     She has stage 4 CKD but today her creatinine level is better than last admission and is within her baseline  Monitor BMP             VTE Pharmacologic Prophylaxis: VTE Score: 6 High Risk (Score >/= 5) - Pharmacological DVT Prophylaxis Ordered: enoxaparin (Lovenox). Sequential Compression Devices Ordered. Patient Centered Rounds: I performed bedside rounds with nursing staff today. Discussions with Specialists or Other Care Team Provider: Attending, Senior resident    Education and Discussions with Family / Patient: Updated  () via phone. Total Time Spent on Date of Encounter in care of patient: 45 mins. This time was spent on one or more of the following: performing physical exam; counseling and coordination of care; obtaining or reviewing history; documenting in the medical record; reviewing/ordering tests, medications or procedures; communicating with other healthcare professionals and discussing with patient's family/caregivers.     Current Length of Stay: 0 day(s)  Current Patient Status: Inpatient   Certification Statement: The patient will continue to require additional inpatient hospital stay due to COPD exacerbation  Discharge Plan: Anticipate discharge tomorrow to home. Code Status: Level 3 - DNAR and DNI    Subjective:   No acute event overnight. This morning patient was seen and examined at bedside. Patient is alert and oriented. She could tell her name and where she is. She could not diminish on the ER correctly. Denied fever, chills, chest pain, shortness of breath, abdominal pain, palpitation. She slept well last night. She endorses having a cough and on my observation it was noisy without any wheezing and rales on my auscultation. She was saturating 94 to 95% on 2 L oxygen this morning. She is hemodynamically stable. Objective:     Vitals:   Temp (24hrs), Av.2 °F (36.8 °C), Min:97.8 °F (36.6 °C), Max:98.5 °F (36.9 °C)    Temp:  [97.8 °F (36.6 °C)-98.5 °F (36.9 °C)] 97.8 °F (36.6 °C)  HR:  [62-85] 66  Resp:  [16-19] 16  BP: (140-158)/(70-84) 140/75  SpO2:  [93 %-98 %] 93 %  Body mass index is 29.01 kg/m². Input and Output Summary (last 24 hours): Intake/Output Summary (Last 24 hours) at 2023 1639  Last data filed at 2023 1513  Gross per 24 hour   Intake 720 ml   Output 829 ml   Net -109 ml       Physical Exam:   Physical Exam  Vitals and nursing note reviewed. Constitutional:       Appearance: Normal appearance. HENT:      Head: Normocephalic and atraumatic. Nose: Nose normal.      Mouth/Throat:      Mouth: Mucous membranes are moist.      Pharynx: Oropharynx is clear. Eyes:      Extraocular Movements: Extraocular movements intact. Cardiovascular:      Rate and Rhythm: Normal rate and regular rhythm. Pulses: Normal pulses. Heart sounds: Normal heart sounds. Pulmonary:      Effort: Pulmonary effort is normal. No respiratory distress. Breath sounds: No stridor.  No wheezing or rales. Comments:     Abdominal:      General: Bowel sounds are normal. There is no distension. Palpations: Abdomen is soft. Tenderness: There is no abdominal tenderness. There is no guarding. Musculoskeletal:         General: No swelling or tenderness. Normal range of motion. Cervical back: Normal range of motion and neck supple. No rigidity or tenderness. Right lower leg: No edema. Left lower leg: No edema. Skin:     General: Skin is warm and dry. Capillary Refill: Capillary refill takes less than 2 seconds. Findings: No bruising, erythema, lesion or rash. Neurological:      General: No focal deficit present. Mental Status: She is alert and oriented to person, place, and time. Sensory: No sensory deficit. Motor: No weakness.    Psychiatric:         Mood and Affect: Mood normal.         Behavior: Behavior normal.           Additional Data:     Labs:  Results from last 7 days   Lab Units 09/12/23  1507   WBC Thousand/uL 14.33*   HEMOGLOBIN g/dL 11.9   HEMATOCRIT % 36.3   PLATELETS Thousands/uL 277   NEUTROS PCT % 78*   LYMPHS PCT % 13*   MONOS PCT % 6   EOS PCT % 2     Results from last 7 days   Lab Units 09/12/23  1507   SODIUM mmol/L 138   POTASSIUM mmol/L 3.7   CHLORIDE mmol/L 104   CO2 mmol/L 25   BUN mg/dL 22   CREATININE mg/dL 1.95*   ANION GAP mmol/L 9   CALCIUM mg/dL 8.7   ALBUMIN g/dL 4.0   TOTAL BILIRUBIN mg/dL 0.36   ALK PHOS U/L 62   ALT U/L 21   AST U/L 19   GLUCOSE RANDOM mg/dL 118     Results from last 7 days   Lab Units 09/12/23  1507   INR  0.85     Results from last 7 days   Lab Units 09/13/23  1238 09/13/23  0758 09/12/23  2130 09/12/23  0649 09/11/23  2114 09/11/23  1634 09/11/23  1114 09/11/23  0711 09/10/23  2041 09/10/23  1601 09/10/23  1122 09/10/23  0627   POC GLUCOSE mg/dl 255* 131 152* 212* 230* 81 172* 176* 175* 155* 71 114         Results from last 7 days   Lab Units 09/12/23  1507 09/09/23  0706   LACTIC ACID mmol/L 1.2 1.8 PROCALCITONIN ng/ml 0.12 0.09       Lines/Drains:  Invasive Devices     Peripheral Intravenous Line  Duration           Peripheral IV 09/12/23 Right Antecubital 1 day                      Imaging: Reviewed radiology reports from this admission including: chest xray and xray(s)    Recent Cultures (last 7 days):   Results from last 7 days   Lab Units 09/12/23  1507 09/09/23  0709 09/09/23  0707   BLOOD CULTURE  Received in Microbiology Lab. Culture in Progress. Received in Microbiology Lab. Culture in Progress. No Growth After 4 Days. No Growth After 4 Days.        Last 24 Hours Medication List:   Current Facility-Administered Medications   Medication Dose Route Frequency Provider Last Rate   • acetaminophen  975 mg Oral Q8H PRN Trudy Pugh MD     • acetaminophen  975 mg Oral Q8H [facility] Trudy Pugh MD     • albuterol  2 puff Inhalation Q4H PRN Cheryl Hardy MD     • amLODIPine  5 mg Oral Daily Trudy Pugh MD     • atorvastatin  10 mg Oral Daily With Mary Saravia MD     • benzonatate  100 mg Oral TID Jj Wall MD     • co-enzyme Q-10  100 mg Oral Daily Trudy Pugh MD     • DULoxetine  30 mg Oral Daily Trudy Pugh MD     • famotidine  10 mg Oral Daily Trudy Pugh MD     • ferrous sulfate  325 mg Oral Daily With Breakfast Trudy Pugh MD     • fish oil  1,000 mg Oral Daily Trudy Pugh MD     • gabapentin  300 mg Oral HS Trudy Pugh MD     • heparin (porcine)  5,000 Units Subcutaneous Q8H [facility] Trudy Pugh MD     • insulin detemir  25 Units Subcutaneous HS Trudy Pugh MD     • insulin lispro  16 Units Subcutaneous TID With Meals Trudy Pugh MD     • levothyroxine  125 mcg Oral Daily Trudy Pugh MD     • melatonin  3 mg Oral HS PRN Dale Angeles MD     • metoprolol tartrate  50 mg Oral Q12H [facility] Trudy Pugh MD     • pantoprazole  40 mg Oral BID Trudy Pugh MD     • predniSONE  40 mg Oral Daily Trudy Pugh MD     • QUEtiapine  12.5 mg Oral HS Trudy Pugh MD     • senna  8.6 mg Oral HS Trudy Pugh MD     • torsemide  5 mg Oral Q48H PRN Raj Pickens MD          Today, Patient Was Seen By: Ben Mi MD    **Please Note: This note may have been constructed using a voice recognition system. **

## 2023-09-13 NOTE — ASSESSMENT & PLAN NOTE
She has H/O hypothyroidism and is on levothyroxine 125 mcg daily    Plan:  Continue levothyroxine 125 mcg daily

## 2023-09-13 NOTE — ASSESSMENT & PLAN NOTE
She has H/O hypertension and has been on amlodipine, torsemide and metoprolol tartrate 50 mg at home  Today BP is 140/75    Plan:  Continue home medication   Monitor BP

## 2023-09-13 NOTE — ED ATTENDING ATTESTATION
9/12/2023  IHeath MD, saw and evaluated the patient. I have discussed the patient with the resident/non-physician practitioner and agree with the resident's/non-physician practitioner's findings, Plan of Care, and MDM as documented in the resident's/non-physician practitioner's note, except where noted. All available labs and Radiology studies were reviewed. I was present for key portions of any procedure(s) performed by the resident/non-physician practitioner and I was immediately available to provide assistance. At this point I agree with the current assessment done in the Emergency Department.   I have conducted an independent evaluation of this patient a history and physical is as follows:see h and p above- agree with er residett tx plan  dispo    ED Course  ED Course as of 09/13/23 1701   Tue Sep 12, 2023   1524 ER MD NOTE- RECENT LABS/ UC/ AND D/C SUMMARY REVIEWED BY ER MD- MOST RECENT CARDIAC TESTING REVIEWED BY ER MD   1545 Er md note- review of er 12 lead ecg-  nsr rate of  73- 1 av block - compared to previous  9/10/23- no sign changes   1611 Cxr portable-  compared to previous 9/9/120- no sign changes- no infiiltrate- ptx- overy pulm edema / pleural effusions   1633 Er md note-  pt - re-eval  - pt feels-  after 1 hr neb-- still with mild audible/expri wheezes-- will cont to re-eval- pending admit         Critical Care Time  Procedures

## 2023-09-13 NOTE — ASSESSMENT & PLAN NOTE
She has H/O hypothyroidism and is on levothyroxine 125 mcg daily    Plan:  Continue levothyroxine 125 mcg daily  Follow-up with PCP

## 2023-09-13 NOTE — ASSESSMENT & PLAN NOTE
Lab Results   Component Value Date    HGBA1C 7.5 (H) 09/02/2023       Recent Labs     09/12/23  0649 09/12/23  2130 09/13/23  0758 09/13/23  1238   POCGLU 212* 152* 131 255*       Blood Sugar Average: Last 72 hrs:  (P) [id number] was on Levemir 50 units and Novolog 16 units at home  Pt had poor appetite on admission. Continued decreased basal insulin to half dose of home regimen.    Continue home insulin regimen  Monitor blood glucose  Monitor for hypoglycemia symptoms  Follow-up with PCP

## 2023-09-13 NOTE — ED PROCEDURE NOTE
PROCEDURE  CriticalCare Time    Date/Time: 9/12/2023 3:00 PM    Performed by: Catarina Craven MD  Authorized by: Catarina Craven MD    Critical care provider statement:     Critical care time (minutes):  35    Critical care start time:  9/12/2023 4:00 PM    Critical care end time:  9/5/2023 4:35 PM    Critical care was necessary to treat or prevent imminent or life-threatening deterioration of the following conditions:  Respiratory failure    Critical care was time spent personally by me on the following activities:  Examination of patient, evaluation of patient's response to treatment, development of treatment plan with patient or surrogate, obtaining history from patient or surrogate, review of old charts, re-evaluation of patient's condition, ordering and review of radiographic studies, ordering and review of laboratory studies and ordering and performing treatments and interventions    I assumed direction of critical care for this patient from another provider in my specialty: no    Comments:      Pt with acute copd exacerbation -requiring hr long resp  tx  and serial er md evaluations and assessements          Catarina Craven MD  09/13/23 [id number]

## 2023-09-13 NOTE — ASSESSMENT & PLAN NOTE
Lab Results   Component Value Date    EGFR 24 09/12/2023    EGFR 24 09/11/2023    EGFR 20 09/10/2023    CREATININE 1.95 (H) 09/12/2023    CREATININE 2.00 (H) 09/11/2023    CREATININE 2.24 (H) 09/10/2023     She has stage 4 CKD but today her creatinine level is better than last admission and is within her baseline  Monitor BMP  Follow-up with PCP

## 2023-09-13 NOTE — ASSESSMENT & PLAN NOTE
Patient presented with shortness of breath and upper airway sounds. Home albuterol did not provide relief. She is on 2-3 L oxygen after admission, but she is not using oxygen at her baseline. Denied fever, chills, chest pain, palpitation. This morning she was on 3 L with saturation 97%. She was saturating 94-95% when O2 was being titrated down to 2 L. She has noisy cough but no phlegm came out that time. No wheezing, rales heard on auscultation  COVID 19 negative  She was saturating more than 90% on room air. She walked around without oxygen nasal cannula and did not have shortness of breath after walking. Plan  Continue albuterol inhaler as needed for shortness of breath and wheezing.    No need of prednisone on discharge  Follow-up with PCP  Follow-up with pulmonology tomorrow for further evaluation of COPD

## 2023-09-14 VITALS
HEIGHT: 63 IN | WEIGHT: 161.16 LBS | DIASTOLIC BLOOD PRESSURE: 84 MMHG | TEMPERATURE: 98.1 F | SYSTOLIC BLOOD PRESSURE: 151 MMHG | BODY MASS INDEX: 28.55 KG/M2 | RESPIRATION RATE: 16 BRPM | OXYGEN SATURATION: 93 % | HEART RATE: 70 BPM

## 2023-09-14 LAB
ANION GAP SERPL CALCULATED.3IONS-SCNC: 8 MMOL/L
ATRIAL RATE: 73 BPM
BACTERIA BLD CULT: NORMAL
BACTERIA BLD CULT: NORMAL
BASOPHILS # BLD AUTO: 0.05 THOUSANDS/ÂΜL (ref 0–0.1)
BASOPHILS NFR BLD AUTO: 0 % (ref 0–1)
BUN SERPL-MCNC: 27 MG/DL (ref 5–25)
CALCIUM SERPL-MCNC: 8.1 MG/DL (ref 8.4–10.2)
CHLORIDE SERPL-SCNC: 102 MMOL/L (ref 96–108)
CO2 SERPL-SCNC: 24 MMOL/L (ref 21–32)
CREAT SERPL-MCNC: 2.06 MG/DL (ref 0.6–1.3)
EOSINOPHIL # BLD AUTO: 0.03 THOUSAND/ÂΜL (ref 0–0.61)
EOSINOPHIL NFR BLD AUTO: 0 % (ref 0–6)
ERYTHROCYTE [DISTWIDTH] IN BLOOD BY AUTOMATED COUNT: 17.6 % (ref 11.6–15.1)
GFR SERPL CREATININE-BSD FRML MDRD: 23 ML/MIN/1.73SQ M
GLUCOSE SERPL-MCNC: 232 MG/DL (ref 65–140)
GLUCOSE SERPL-MCNC: 243 MG/DL (ref 65–140)
GLUCOSE SERPL-MCNC: 248 MG/DL (ref 65–140)
HCT VFR BLD AUTO: 33 % (ref 34.8–46.1)
HGB BLD-MCNC: 10.5 G/DL (ref 11.5–15.4)
IMM GRANULOCYTES # BLD AUTO: 0.15 THOUSAND/UL (ref 0–0.2)
IMM GRANULOCYTES NFR BLD AUTO: 1 % (ref 0–2)
LYMPHOCYTES # BLD AUTO: 1.48 THOUSANDS/ÂΜL (ref 0.6–4.47)
LYMPHOCYTES NFR BLD AUTO: 13 % (ref 14–44)
MCH RBC QN AUTO: 28.2 PG (ref 26.8–34.3)
MCHC RBC AUTO-ENTMCNC: 31.8 G/DL (ref 31.4–37.4)
MCV RBC AUTO: 89 FL (ref 82–98)
MONOCYTES # BLD AUTO: 0.67 THOUSAND/ÂΜL (ref 0.17–1.22)
MONOCYTES NFR BLD AUTO: 6 % (ref 4–12)
NEUTROPHILS # BLD AUTO: 9.46 THOUSANDS/ÂΜL (ref 1.85–7.62)
NEUTS SEG NFR BLD AUTO: 80 % (ref 43–75)
NRBC BLD AUTO-RTO: 0 /100 WBCS
P AXIS: 67 DEGREES
PLATELET # BLD AUTO: 256 THOUSANDS/UL (ref 149–390)
PMV BLD AUTO: 10.4 FL (ref 8.9–12.7)
POTASSIUM SERPL-SCNC: 4 MMOL/L (ref 3.5–5.3)
PR INTERVAL: 224 MS
QRS AXIS: 11 DEGREES
QRSD INTERVAL: 82 MS
QT INTERVAL: 408 MS
QTC INTERVAL: 449 MS
RBC # BLD AUTO: 3.72 MILLION/UL (ref 3.81–5.12)
SODIUM SERPL-SCNC: 134 MMOL/L (ref 135–147)
T WAVE AXIS: 3 DEGREES
VENTRICULAR RATE: 73 BPM
WBC # BLD AUTO: 11.84 THOUSAND/UL (ref 4.31–10.16)

## 2023-09-14 PROCEDURE — 80048 BASIC METABOLIC PNL TOTAL CA: CPT

## 2023-09-14 PROCEDURE — 85025 COMPLETE CBC W/AUTO DIFF WBC: CPT

## 2023-09-14 PROCEDURE — 99239 HOSP IP/OBS DSCHRG MGMT >30: CPT | Performed by: INTERNAL MEDICINE

## 2023-09-14 PROCEDURE — 93010 ELECTROCARDIOGRAM REPORT: CPT | Performed by: INTERNAL MEDICINE

## 2023-09-14 PROCEDURE — 82948 REAGENT STRIP/BLOOD GLUCOSE: CPT

## 2023-09-14 RX ORDER — BENZONATATE 100 MG/1
100 CAPSULE ORAL 3 TIMES DAILY
Qty: 20 CAPSULE | Refills: 0 | Status: SHIPPED | OUTPATIENT
Start: 2023-09-14 | End: 2023-09-20

## 2023-09-14 RX ORDER — GUAIFENESIN 600 MG/1
600 TABLET, EXTENDED RELEASE ORAL EVERY 12 HOURS SCHEDULED
Qty: 14 TABLET | Refills: 0 | Status: SHIPPED | OUTPATIENT
Start: 2023-09-14 | End: 2023-09-21

## 2023-09-14 RX ADMIN — GLYCERIN 1 DROP: .002; .002; .01 SOLUTION/ DROPS OPHTHALMIC at 00:19

## 2023-09-14 RX ADMIN — BENZONATATE 100 MG: 100 CAPSULE ORAL at 08:15

## 2023-09-14 RX ADMIN — PREDNISONE 40 MG: 20 TABLET ORAL at 08:15

## 2023-09-14 RX ADMIN — METOPROLOL TARTRATE 50 MG: 50 TABLET, FILM COATED ORAL at 08:14

## 2023-09-14 RX ADMIN — DULOXETINE HYDROCHLORIDE 30 MG: 30 CAPSULE, DELAYED RELEASE ORAL at 08:15

## 2023-09-14 RX ADMIN — OMEGA-3 FATTY ACIDS CAP 1000 MG 1000 MG: 1000 CAP at 08:14

## 2023-09-14 RX ADMIN — HEPARIN SODIUM 5000 UNITS: 5000 INJECTION INTRAVENOUS; SUBCUTANEOUS at 05:33

## 2023-09-14 RX ADMIN — Medication 100 MG: at 08:15

## 2023-09-14 RX ADMIN — INSULIN LISPRO 16 UNITS: 100 INJECTION, SOLUTION INTRAVENOUS; SUBCUTANEOUS at 08:12

## 2023-09-14 RX ADMIN — PANTOPRAZOLE SODIUM 40 MG: 40 TABLET, DELAYED RELEASE ORAL at 08:15

## 2023-09-14 RX ADMIN — LEVOTHYROXINE SODIUM 125 MCG: 125 TABLET ORAL at 08:15

## 2023-09-14 RX ADMIN — FAMOTIDINE 10 MG: 20 TABLET ORAL at 08:14

## 2023-09-14 RX ADMIN — ACETAMINOPHEN 975 MG: 325 TABLET, FILM COATED ORAL at 05:33

## 2023-09-14 RX ADMIN — AMLODIPINE BESYLATE 5 MG: 5 TABLET ORAL at 08:16

## 2023-09-14 RX ADMIN — GUAIFENESIN 600 MG: 600 TABLET ORAL at 08:14

## 2023-09-14 RX ADMIN — FERROUS SULFATE TAB 325 MG (65 MG ELEMENTAL FE) 325 MG: 325 (65 FE) TAB at 08:14

## 2023-09-14 RX ADMIN — INSULIN LISPRO 16 UNITS: 100 INJECTION, SOLUTION INTRAVENOUS; SUBCUTANEOUS at 12:15

## 2023-09-14 NOTE — DISCHARGE SUMMARY
[facility] [facility]  Discharge- Karyle Servant 1950, 75 y.o. female MRN: [id number]  Unit/Bed#: W -58 Encounter: [id number]  Primary Care Provider: Sam Velazquez MD   Date and time admitted to hospital: 9/12/2023  2:46 PM    * COPD (chronic obstructive pulmonary disease) [facility])  Assessment & Plan  Patient presented with shortness of breath and upper airway sounds. Home albuterol did not provide relief. She is on 2-3 L oxygen after admission, but she is not using oxygen at her baseline. Denied fever, chills, chest pain, palpitation. This morning she was on 3 L with saturation 97%. She was saturating 94-95% when O2 was being titrated down to 2 L. She has noisy cough but no phlegm came out that time. No wheezing, rales heard on auscultation  COVID 19 negative  She was saturating more than 90% on room air. She walked around without oxygen nasal cannula and did not have shortness of breath after walking. Plan  Continue albuterol inhaler as needed for shortness of breath and wheezing. No need of prednisone on discharge  Follow-up with PCP  Follow-up with pulmonology tomorrow for further evaluation of COPD    Behavioral and psychological symptoms of dementia [facility])  Assessment & Plan  Per her  she has some baseline dementia. She just discharged from hospital after being treated for altered mental status  Last night the night doctor received call for confusion but he could not find any obvious new confusion  This morning she was alert, oriented to place where she is and she could tell her name . She could not mention the year .     Plan:  Monitor for AMS  Continue seroquil 12.5 mg daily at bedtime as her home medication  Continue duloxetine for depression  Low up with PCP    Diabetic nephropathy associated with type 2 diabetes mellitus [facility])  Assessment & Plan  Lab Results   Component Value Date    HGBA1C 7.5 (H) 09/02/2023       Recent Labs     09/12/23  0649 09/12/23 2130 09/13/23  0758 09/13/23  1238   POCGLU 212* 152* 131 255*       Blood Sugar Average: Last 72 hrs:  (P) [id number] was on Levemir 50 units and Novolog 16 units at home  Pt had poor appetite on admission. Continued decreased basal insulin to half dose of home regimen. Continue home insulin regimen  Monitor blood glucose  Monitor for hypoglycemia symptoms  Follow-up with PCP    Hypothyroid  Assessment & Plan  She has H/O hypothyroidism and is on levothyroxine 125 mcg daily    Plan:  Continue levothyroxine 125 mcg daily  Follow-up with PCP    Urinary retention  Assessment & Plan  Patient has history of urinary retention. Her urology recommended to use straight catheter to complete emptying of bladder once daily. The patient has history of urinary retention and altered mental status which brought her to the hospital and got admitted. She has 1 episode of urinary incontinence after present admission. Most likely she is having urinary retention.   Use straight catheter to empty your bladder 2 times in a day  Follow-up with PCP    Essential hypertension  Assessment & Plan  She has H/O hypertension and has been on amlodipine, torsemide and metoprolol tartrate 50 mg at home  Today BP is 151/84    Plan:  Continue home medication   Monitor BP  Follow-up with PCP    Stage 4 chronic kidney disease [facility])  Assessment & Plan  Lab Results   Component Value Date    EGFR 24 09/12/2023    EGFR 24 09/11/2023    EGFR 20 09/10/2023    CREATININE 1.95 (H) 09/12/2023    CREATININE 2.00 (H) 09/11/2023    CREATININE 2.24 (H) 09/10/2023     She has stage 4 CKD but today her creatinine level is better than last admission and is within her baseline  Monitor BMP  Follow-up with PCP        Medical Problems     Resolved Problems  Date Reviewed: 9/14/2023   None       Discharging Physician / Practitioner: Chelsey Shane MD  PCP: Juan Woods MD  Admission Date:   Admission Orders (From admission, onward)     Ordered 09/13/23 1553  Inpatient Admission  Once            09/12/23 1641  Place in Observation  Once                      Discharge Date: 09/14/23    Consultations During Hospital Stay:  · None    Procedures Performed:   · None    Significant Findings / Test Results:   · None    Incidental Findings:   · None  · I reviewed the above mentioned incidental findings with the patient and/or family and they expressed understanding. Test Results Pending at Discharge (will require follow up): · None     Outpatient Tests Requested:  · None    Complications: None    Reason for Admission: Shortness of breath    Hospital Course:   Karyle Servant is a 75 y.o. female patient who originally presented to the hospital on 9/12/2023 due to shortness of breath secondary to COPD exacerbation. Patient was discharged from the hospital on the same day. Once the patient arrived at home she was feeling like she was having increasing work of breathing and shortness of breath at rest not relieved by albuterol prompted her return to the hospital.  Patient mentioned that she has been using her rescue inhaler twice a day every single day while at home. In the emergency department she is requiring 2 L of oxygen. After admission she was given oxygen via nasal cannula 2 to 3 L/min and DuoNeb nebulizer, steroid 40 mg. She was doing well this treatment. After admission she denied having any shortness of breath and she is saturating well more than 94% on room air. Yesterday nurse tried to walk around the hallway and he could walk with her walker without having any shortness of breath on room air. She had 1 episode of urinary and bladder bowel incontinence on the night of admission. But she denied any other episode yesterday night. She is stable to be discharged today on high risk rescuer inhaler and cough medicine with her pulmonology doctor. Please see above list of diagnoses and related plan for additional information.      Condition at Discharge: stable    Discharge Day Visit / Exam:   Subjective: No acute event overnight. But patient could not sleep at night. This morning patient was seen and examined at bedside. She was emotionally upset this morning and was crying. Denied shortness of breath, cough, chest pain, palpitation, abdominal pain, urinary or bowel incontinence, leg pain, leg swelling. She is saturating more than 94% on room air. She had a bowel movement and she is planning to eat well. She is hemodynamically stable. Vitals: Blood Pressure: 151/84 (09/14/23 1032)  Pulse: 70 (09/14/23 1032)  Temperature: 98.1 °F (36.7 °C) (09/14/23 0724)  Temp Source: Oral (09/13/23 2020)  Respirations: 16 (09/14/23 0724)  Height: 5' 2.5" (158.8 cm) (09/12/23 1831)  Weight - Scale: 73.1 kg (161 lb 2.5 oz) (09/12/23 1831)  SpO2: 93 % (09/14/23 1032)  Exam:   Physical Exam  Vitals and nursing note reviewed. Constitutional:       Appearance: Normal appearance. HENT:      Head: Normocephalic and atraumatic. Nose: Nose normal.      Mouth/Throat:      Mouth: Mucous membranes are moist.      Pharynx: Oropharynx is clear. Eyes:      Extraocular Movements: Extraocular movements intact. Cardiovascular:      Rate and Rhythm: Normal rate and regular rhythm. Pulses: Normal pulses. Heart sounds: Normal heart sounds. Pulmonary:      Effort: Pulmonary effort is normal. No respiratory distress. Breath sounds: No stridor. No wheezing or rales. Comments:     Abdominal:      General: Bowel sounds are normal. There is no distension. Palpations: Abdomen is soft. Tenderness: There is no abdominal tenderness. There is no guarding. Musculoskeletal:         General: No swelling or tenderness. Normal range of motion. Cervical back: Normal range of motion and neck supple. No rigidity or tenderness. Right lower leg: No edema. Left lower leg: No edema. Skin:     General: Skin is warm and dry.       Capillary Refill: Capillary refill takes less than 2 seconds. Findings: No bruising, erythema, lesion or rash. Neurological:      General: No focal deficit present. Mental Status: She is alert and oriented to person, place, and time. Sensory: No sensory deficit. Motor: No weakness. Psychiatric:         Mood and Affect: Mood normal.         Behavior: Behavior normal.           Discussion with Family: Updated  ( and son) at bedside. Discharge instructions/Information to patient and family:   See after visit summary for information provided to patient and family. Provisions for Follow-Up Care:  See after visit summary for information related to follow-up care and any pertinent home health orders. Disposition:   Home    Planned Readmission: No     Discharge Statement:  I spent 45 minutes discharging the patient. This time was spent on the day of discharge. I had direct contact with the patient on the day of discharge. Greater than 50% of the total time was spent examining patient, answering all patient questions, arranging and discussing plan of care with patient as well as directly providing post-discharge instructions. Additional time then spent on discharge activities. Discharge Medications:  See after visit summary for reconciled discharge medications provided to patient and/or family.       **Please Note: This note may have been constructed using a voice recognition system**

## 2023-09-14 NOTE — CASE MANAGEMENT
Case Management Assessment & Discharge Planning Note    Patient name Terence CHAWLA MS 1/W -04 MRN [id number]  : 1950 Date 2023       Current Admission Date: 2023  Current Admission Diagnosis:COPD (chronic obstructive pulmonary disease) [facility])   Patient Active Problem List    Diagnosis Date Noted   • Parenchymal renal hypertension 2023   • Behavioral and psychological symptoms of dementia ([address]) 2023   • Postural dizziness with presyncope 2023   • Varicose veins of both lower extremities with pain 2023   • Palpitations 2023   • Anemia 2023   • Diabetic nephropathy associated with type 2 diabetes mellitus ([address]) 2023   • Recent acute cystitis without hematuria 2023   • Hyponatremia 2023   • Seasonal allergies 2023   • 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 2023   • Hypothyroid 2023   • Urinary incontinence 2022   • Urinary retention 2022   • Osteoporosis 2022   • Chronic constipation 2021   • COPD (chronic obstructive pulmonary disease) ([address]) 2021   • Depression, recurrent ([address]) 2021   • Gastroparesis diabeticorum ([address]) 2021   • B12 neuropathy ([address]) 2021   • Ambulatory dysfunction 10/22/2020   • Memory changes 10/22/2020   • Essential hypertension 2020   • Type 2 diabetes mellitus with diabetic chronic kidney disease ([address]) 2020   • Gastroesophageal reflux disease without esophagitis 2020   • Leukocytosis 2020   • AMS (altered mental status) 2020   • Fibromyalgia 2020   • CKD (chronic kidney disease) stage 4, GFR 15-29 ml/min ([address]) 2020   • Hypertriglyceridemia 2020   • Vitamin D deficiency 2020   • Pulmonary hypertension ([address]) 2019   • Persistent proteinuria 2019   • BARBARA (obstructive sleep apnea) 2019   • RLS (restless legs syndrome) 2019   • Stage 4 chronic kidney disease ([facility]) 09/19/2018      LOS (days): 1  Geometric Mean LOS (GMLOS) (days): 2.70  Days to GMLOS:1.7     OBJECTIVE:  PATIENT READMITTED [address] of Unplanned Readmission Score: 55.49         Current admission status: Inpatient       Preferred Pharmacy:   [facility] [address], [address]  [address] [place] [id number]  Phone: [phone] Fax: [phone]    Primary Care Provider: Lino Willoughby MD    Primary Insurance: MEDICARE  Secondary Insurance: COMMERCIAL MISCELLANEOUS    ASSESSMENT:  Active Health Care Proxies     [facility] Representative - Spouse   Primary Phone: [phone] (Mobile)  Home Phone: [phone]               Readmission Root Cause  30 Day Readmission: Yes    Patient Information  Admitted from[de-identified] Home  Mental Status: Alert  During Assessment patient was accompanied by: Not accompanied during assessment  Assessment information provided by[de-identified] Patient  Primary Caregiver: Self  Support Systems: Spouse/significant other, Son  [place] of Residence: [address] do you live in?: [address] [facility] of Daily Living Prior to Admission  Functional Status: Independent  Completes ADLs independently?: Yes  Ambulates independently?: Yes  Does patient use assisted devices?: Yes  Assisted Devices (DME) used:  WheelPhani garcia  Does patient currently own DME?: Yes  What DME does the patient currently own?: Wheelchair, Walker, [address] of Transport to [facility][de-identified] Family transport  In the past 12 months, has lack of transportation kept you from medical appointments or from getting medications?: No  In the past 12 months, has lack of transportation kept you from meetings, work, or from getting things needed for daily living?: No  Was application for public transport provided?: N/A    DISCHARGE DETAILS:    Discharge planning discussed with[de-identified] patient at bedside  Freedom of Choice: Yes     Other Referral/Resources/Interventions Provided:  Interventions: Outpatient PT, Outpatient OT  Referral Comments: [facility] w/  COPD. Is a readmit. Patient lives with her  in a 2 story home, 0STE- can reside on the first floor. Patient owns and uses a cane, walker, rollator and transport chair. Patient aware PT is recommending OP therapy (neuro)- location guide provided. No further CM needs at this time.     Would you like to participate in our [facility] xTV service program?  : No - Declined    Treatment Team Recommendation: Home  Discharge Destination Plan[de-identified] Home  Transport at Discharge : Family      IMM Given (Date):: 09/13/23  IMM Given to[de-identified] Patient

## 2023-09-14 NOTE — PLAN OF CARE
Problem: MOBILITY - ADULT  Goal: Maintain or return to baseline ADL function  Description: INTERVENTIONS:  -  Assess patient's ability to carry out ADLs; assess patient's baseline for ADL function and identify physical deficits which impact ability to perform ADLs (bathing, care of mouth/teeth, toileting, grooming, dressing, etc.)  - Assess/evaluate cause of self-care deficits   - Assess range of motion  - Assess patient's mobility; develop plan if impaired  - Assess patient's need for assistive devices and provide as appropriate  - Encourage maximum independence but intervene and supervise when necessary  - Involve family in performance of ADLs  - Assess for home care needs following discharge   - Consider OT consult to assist with ADL evaluation and planning for discharge  - Provide patient education as appropriate  Outcome: Progressing  Goal: Maintains/Returns to pre admission functional level  Description: INTERVENTIONS:  - Perform BMAT or MOVE assessment daily.   - Set and communicate daily mobility goal to care team and patient/family/caregiver. - Collaborate with rehabilitation services on mobility goals if consulted  - Perform Range of Motion times a day. - Reposition patient every hours.   - Dangle patient times a day  - Stand patient times a day  - Ambulate patient times a day  - Out of bed to chair times a day   - Out of bed for meals times a day  - Out of bed for toileting  - Record patient progress and toleration of activity level   Outcome: Progressing

## 2023-09-14 NOTE — DISCHARGE INSTR - AVS FIRST PAGE
Dear Andria Mccall,     It was our pleasure to care for you here at [facility]. It is our hope that we were always able to exceed the expected standards for your care during your stay. You were hospitalized due to ***. You were cared for on the *** floor by Brock Hernandez MD under the service of Yee Hancock MD with the [facility] who covers for your primary care physician (PCP), Bonnie Quinn MD, while you were hospitalized. If you have any questions or concerns related to this hospitalization, you may contact us at [phone]. For follow up as well as any medication refills, we recommend that you follow up with your primary care physician. A registered nurse will reach out to you by phone within a few days after your discharge to answer any additional questions that you may have after going home.   However, at this time we provide for you here, the most important instructions / recommendations at discharge:     Notable Medication Adjustments -   Take benzonatate 100 mg 1 capsule by mouth 3 times daily as needed for cough  Take guaifenesin 600 mg by mouth 1 tab at night 2 times a day as needed for cough for 7 days  Continue taking albuterol inhaler 2 puffs 4 times daily as needed for wheezing and shortness of breath  Testing Required after Discharge -   None  Important follow up information -   Please follow-up with PCP within 1 week  Please follow-up with pulmonology within 1 to 2 weeks  Other Instructions -   Please go to the ED for any worsening shortness of breath, chest pain, racing heart headaches, blurred vision  Use a straight catheter to urinate 2 times daily  Please call your doctor or go to the ED can use of rescue inhaler every 2 hours with or without wheezing  Please call your doctor if you gain more than 2 pounds in a day or more than 5 pounds in 1 week  Please review this entire after visit summary as additional general instructions including medication list, appointments, activity, diet, any pertinent wound care, and other additional recommendations from your care team that may be provided for you.       Sincerely,     Waqas Dalal MD

## 2023-09-14 NOTE — ASSESSMENT & PLAN NOTE
Patient has history of urinary retention. Her urology recommended to use straight catheter to complete emptying of bladder once daily. The patient has history of urinary retention and altered mental status which brought her to the hospital and got admitted. She has 1 episode of urinary incontinence after present admission. Most likely she is having urinary retention.   Use straight catheter to empty your bladder 2 times in a day  Follow-up with PCP

## 2023-09-15 ENCOUNTER — TELEPHONE (OUTPATIENT)
Dept: INTERNAL MEDICINE CLINIC | Facility: CLINIC | Age: 73
End: 2023-09-15

## 2023-09-15 ENCOUNTER — TRANSITIONAL CARE MANAGEMENT (OUTPATIENT)
Dept: INTERNAL MEDICINE CLINIC | Facility: CLINIC | Age: 73
End: 2023-09-15

## 2023-09-17 LAB
BACTERIA BLD CULT: NORMAL
BACTERIA BLD CULT: NORMAL

## 2023-09-19 ENCOUNTER — OFFICE VISIT (OUTPATIENT)
Dept: NEPHROLOGY | Facility: CLINIC | Age: 73
End: 2023-09-19
Payer: MEDICARE

## 2023-09-19 VITALS — HEIGHT: 63 IN | WEIGHT: 171 LBS | BODY MASS INDEX: 30.3 KG/M2

## 2023-09-19 DIAGNOSIS — E11.21 DIABETIC NEPHROPATHY ASSOCIATED WITH TYPE 2 DIABETES MELLITUS (HCC): ICD-10-CM

## 2023-09-19 DIAGNOSIS — I12.9 PARENCHYMAL RENAL HYPERTENSION, STAGE 1 THROUGH STAGE 4 OR UNSPECIFIED CHRONIC KIDNEY DISEASE: Primary | ICD-10-CM

## 2023-09-19 DIAGNOSIS — N18.4 ANEMIA IN STAGE 4 CHRONIC KIDNEY DISEASE: ICD-10-CM

## 2023-09-19 DIAGNOSIS — R80.1 PERSISTENT PROTEINURIA: ICD-10-CM

## 2023-09-19 DIAGNOSIS — N25.81 SECONDARY HYPERPARATHYROIDISM OF RENAL ORIGIN (HCC): ICD-10-CM

## 2023-09-19 DIAGNOSIS — E21.3 HYPERPARATHYROIDISM (HCC): ICD-10-CM

## 2023-09-19 DIAGNOSIS — D63.1 ANEMIA IN STAGE 4 CHRONIC KIDNEY DISEASE: ICD-10-CM

## 2023-09-19 DIAGNOSIS — E55.9 VITAMIN D DEFICIENCY: ICD-10-CM

## 2023-09-19 DIAGNOSIS — R33.9 URINARY RETENTION: ICD-10-CM

## 2023-09-19 DIAGNOSIS — N17.9 AKI (ACUTE KIDNEY INJURY) (HCC): ICD-10-CM

## 2023-09-19 DIAGNOSIS — N18.4 STAGE 4 CHRONIC KIDNEY DISEASE (HCC): ICD-10-CM

## 2023-09-19 PROCEDURE — 99214 OFFICE O/P EST MOD 30 MIN: CPT | Performed by: INTERNAL MEDICINE

## 2023-09-19 NOTE — PATIENT INSTRUCTIONS
Visit summary:  -Overall kidney function appears back to baseline after the acute events in the hospital  - We are going to be rechecking lab work at this time  - We are also going to be checking home blood pressure readings to see if we need to make any adjustments    We will set up an advance care plan meeting for you to put your desires and wishes in the computer system so that we honor and respect your wishes        1. Medication changes today:  None pending follow-up labs and home blood pressure readings    We will set you up for an advance care plan meeting at your convenience    2. Please go for  fasting  lab work over the next week or so    3. Please take 1 week a blood pressure readings at this time just sitting would be great    AS FOLLOWS  MORNING AND EVENING, SITTING  as follows:  TAKE THE MORNING READINGS BEFORE ANY MEDICATIONS AND WHEN YOU ARE RELAXED FOR SEVERAL MINUTES  TAKE THE EVENING READINGS:  BETWEEN 7-10 P.M.; PRIOR TO ANY MEDICATIONS; AT LEAST IN OUR  FROM DINNER; AND CERTAINLY AFTER RELAXING FOR A FEW MINUTES  PLEASE INCLUDE HEART RATE WITH YOUR BLOOD PRESSURE READINGS  When taking standing readings, keep your arm supported at heart level and not dangling  Make sure you are sitting with your back supported and feet on the ground and do not cross your legs or feet  Make sure you have not taken any coffee or caffeine products or exercised or smoke cigarettes at least 30 minutes before taking your blood pressure  Then please mail these readings into the office    4. Please go for lab work nonfasting in 1 month    5. Follow-up in 3-4 months  Please bring in 1 week a blood pressure readings morning evening, sitting and standing is outlined above  PLEASE BRING AN YOUR BLOOD PRESSURE MACHINE TO CORRELATE WITH THE OFFICE MACHINE AT THIS NEXT SCHEDULED VISIT  Please go for fasting lab work 1-2 weeks prior to your appointment      6.   General instructions:  AVOID SALT BUT NOT ADDING AN READING LABELS TO MAKE SURE THERE IS LOW-SALT IN THE FOOD THAT YOU ARE EATING  Goal is less than 2 g of sodium intake or less than 5 g of sodium chloride intake per day    Avoid nonsteroidal anti-inflammatory drugs such as Naprosyn, ibuprofen, Aleve, Advil, Celebrex, Meloxicam (Mobic) etc.  You can use Tylenol as needed if you do not have any liver condition to be concerned about    Avoid medications such as Sudafed or decongestants and antihistamines that contained the D component which is the decongestant. You can take antihistamines without the decongestant or D component. Try to avoid medications such as pantoprazole or  Protonix/Nexium or Esomeprazole)/Prilosec or omeprazole/Prevacid or lansoprazole/AcipHex or Rabeprazole. If you are able to, use Pepcid as this is safer for your kidneys. Try to exercise at least 30 minutes 3 days a week to begin with with an ultimate goal of 5 days a week for at least 30 minutes    Try to lose 5-10 lb by your next visit    Please do not drink more than 2 glasses of alcohol/wine on a daily basis as this may contribute to your high blood pressure.

## 2023-09-21 ENCOUNTER — APPOINTMENT (OUTPATIENT)
Dept: LAB | Facility: AMBULARY SURGERY CENTER | Age: 73
End: 2023-09-21
Payer: MEDICARE

## 2023-09-21 ENCOUNTER — OFFICE VISIT (OUTPATIENT)
Dept: INTERNAL MEDICINE CLINIC | Facility: CLINIC | Age: 73
End: 2023-09-21
Payer: MEDICARE

## 2023-09-21 VITALS
RESPIRATION RATE: 20 BRPM | DIASTOLIC BLOOD PRESSURE: 88 MMHG | BODY MASS INDEX: 30.53 KG/M2 | HEART RATE: 66 BPM | OXYGEN SATURATION: 92 % | SYSTOLIC BLOOD PRESSURE: 138 MMHG | TEMPERATURE: 99.4 F | WEIGHT: 169.6 LBS

## 2023-09-21 DIAGNOSIS — E53.8 VITAMIN B12 DEFICIENCY: Primary | ICD-10-CM

## 2023-09-21 DIAGNOSIS — N17.9 AKI (ACUTE KIDNEY INJURY) (HCC): ICD-10-CM

## 2023-09-21 DIAGNOSIS — R33.9 URINARY RETENTION: ICD-10-CM

## 2023-09-21 DIAGNOSIS — I10 ESSENTIAL HYPERTENSION: ICD-10-CM

## 2023-09-21 DIAGNOSIS — D63.1 ANEMIA IN STAGE 4 CHRONIC KIDNEY DISEASE: ICD-10-CM

## 2023-09-21 DIAGNOSIS — E55.9 VITAMIN D DEFICIENCY: ICD-10-CM

## 2023-09-21 DIAGNOSIS — N18.4 ANEMIA IN STAGE 4 CHRONIC KIDNEY DISEASE: ICD-10-CM

## 2023-09-21 DIAGNOSIS — R53.83 OTHER FATIGUE: ICD-10-CM

## 2023-09-21 DIAGNOSIS — E11.21 DIABETIC NEPHROPATHY ASSOCIATED WITH TYPE 2 DIABETES MELLITUS (HCC): ICD-10-CM

## 2023-09-21 DIAGNOSIS — N18.4 STAGE 4 CHRONIC KIDNEY DISEASE (HCC): ICD-10-CM

## 2023-09-21 DIAGNOSIS — G25.81 RLS (RESTLESS LEGS SYNDROME): ICD-10-CM

## 2023-09-21 DIAGNOSIS — E78.2 MIXED HYPERLIPIDEMIA: ICD-10-CM

## 2023-09-21 DIAGNOSIS — I12.9 HYPERTENSIVE CHRONIC KIDNEY DISEASE WITH STAGE 1 THROUGH STAGE 4 CHRONIC KIDNEY DISEASE, OR UNSPECIFIED CHRONIC KIDNEY DISEASE: ICD-10-CM

## 2023-09-21 DIAGNOSIS — I12.9 PARENCHYMAL RENAL HYPERTENSION, STAGE 1 THROUGH STAGE 4 OR UNSPECIFIED CHRONIC KIDNEY DISEASE: ICD-10-CM

## 2023-09-21 DIAGNOSIS — E03.9 HYPOTHYROIDISM, UNSPECIFIED TYPE: ICD-10-CM

## 2023-09-21 DIAGNOSIS — R80.1 PERSISTENT PROTEINURIA: ICD-10-CM

## 2023-09-21 DIAGNOSIS — E53.8 B12 NEUROPATHY (HCC): ICD-10-CM

## 2023-09-21 DIAGNOSIS — G63 B12 NEUROPATHY (HCC): ICD-10-CM

## 2023-09-21 PROBLEM — D72.829 LEUKOCYTOSIS: Status: RESOLVED | Noted: 2020-07-14 | Resolved: 2023-09-21

## 2023-09-21 PROBLEM — E87.1 HYPONATREMIA: Status: RESOLVED | Noted: 2023-04-26 | Resolved: 2023-09-21

## 2023-09-21 LAB
ANION GAP SERPL CALCULATED.3IONS-SCNC: 11 MMOL/L
BUN SERPL-MCNC: 23 MG/DL (ref 5–25)
CALCIUM SERPL-MCNC: 9.4 MG/DL (ref 8.4–10.2)
CHLORIDE SERPL-SCNC: 102 MMOL/L (ref 96–108)
CO2 SERPL-SCNC: 25 MMOL/L (ref 21–32)
CREAT SERPL-MCNC: 2.19 MG/DL (ref 0.6–1.3)
CREAT UR-MCNC: 115 MG/DL
ERYTHROCYTE [DISTWIDTH] IN BLOOD BY AUTOMATED COUNT: 17.2 % (ref 11.6–15.1)
FERRITIN SERPL-MCNC: 21 NG/ML (ref 11–307)
GFR SERPL CREATININE-BSD FRML MDRD: 21 ML/MIN/1.73SQ M
GLUCOSE P FAST SERPL-MCNC: 183 MG/DL (ref 65–99)
HCT VFR BLD AUTO: 39.3 % (ref 34.8–46.1)
HGB BLD-MCNC: 12.3 G/DL (ref 11.5–15.4)
MAGNESIUM SERPL-MCNC: 2 MG/DL (ref 1.9–2.7)
MCH RBC QN AUTO: 28 PG (ref 26.8–34.3)
MCHC RBC AUTO-ENTMCNC: 31.3 G/DL (ref 31.4–37.4)
MCV RBC AUTO: 89 FL (ref 82–98)
PHOSPHATE SERPL-MCNC: 3.7 MG/DL (ref 2.3–4.1)
PLATELET # BLD AUTO: 323 THOUSANDS/UL (ref 149–390)
PMV BLD AUTO: 11.4 FL (ref 8.9–12.7)
POTASSIUM SERPL-SCNC: 4.3 MMOL/L (ref 3.5–5.3)
PROT UR-MCNC: 1003 MG/DL
PROT/CREAT UR: 8.72 MG/G{CREAT} (ref 0–0.1)
PTH-INTACT SERPL-MCNC: 69.2 PG/ML (ref 12–88)
RBC # BLD AUTO: 4.4 MILLION/UL (ref 3.81–5.12)
SODIUM SERPL-SCNC: 138 MMOL/L (ref 135–147)
T4 FREE SERPL-MCNC: 0.78 NG/DL (ref 0.61–1.12)
TSH SERPL DL<=0.05 MIU/L-ACNC: 7.45 UIU/ML (ref 0.45–4.5)
WBC # BLD AUTO: 11.68 THOUSAND/UL (ref 4.31–10.16)

## 2023-09-21 PROCEDURE — 83970 ASSAY OF PARATHORMONE: CPT

## 2023-09-21 PROCEDURE — 83735 ASSAY OF MAGNESIUM: CPT

## 2023-09-21 PROCEDURE — 87147 CULTURE TYPE IMMUNOLOGIC: CPT

## 2023-09-21 PROCEDURE — 84443 ASSAY THYROID STIM HORMONE: CPT

## 2023-09-21 PROCEDURE — 36415 COLL VENOUS BLD VENIPUNCTURE: CPT

## 2023-09-21 PROCEDURE — 85027 COMPLETE CBC AUTOMATED: CPT

## 2023-09-21 PROCEDURE — 84439 ASSAY OF FREE THYROXINE: CPT

## 2023-09-21 PROCEDURE — 84156 ASSAY OF PROTEIN URINE: CPT

## 2023-09-21 PROCEDURE — 82570 ASSAY OF URINE CREATININE: CPT

## 2023-09-21 PROCEDURE — 99496 TRANSJ CARE MGMT HIGH F2F 7D: CPT | Performed by: INTERNAL MEDICINE

## 2023-09-21 PROCEDURE — 87086 URINE CULTURE/COLONY COUNT: CPT

## 2023-09-21 PROCEDURE — 84100 ASSAY OF PHOSPHORUS: CPT

## 2023-09-21 PROCEDURE — 82728 ASSAY OF FERRITIN: CPT

## 2023-09-21 PROCEDURE — 96372 THER/PROPH/DIAG INJ SC/IM: CPT | Performed by: INTERNAL MEDICINE

## 2023-09-21 PROCEDURE — 87186 SC STD MICRODIL/AGAR DIL: CPT

## 2023-09-21 PROCEDURE — 87077 CULTURE AEROBIC IDENTIFY: CPT

## 2023-09-21 PROCEDURE — 80048 BASIC METABOLIC PNL TOTAL CA: CPT

## 2023-09-21 RX ORDER — MELATONIN
2000 DAILY
Qty: 180 TABLET | Refills: 1 | Status: SHIPPED | OUTPATIENT
Start: 2023-09-21

## 2023-09-21 RX ORDER — GABAPENTIN 100 MG/1
200 CAPSULE ORAL
Qty: 90 CAPSULE | Refills: 1 | Status: SHIPPED | OUTPATIENT
Start: 2023-09-21

## 2023-09-21 RX ORDER — CYANOCOBALAMIN 1000 UG/ML
1000 INJECTION, SOLUTION INTRAMUSCULAR; SUBCUTANEOUS
Status: SHIPPED | OUTPATIENT
Start: 2023-09-21

## 2023-09-21 RX ADMIN — CYANOCOBALAMIN 1000 MCG: 1000 INJECTION, SOLUTION INTRAMUSCULAR; SUBCUTANEOUS at 10:34

## 2023-09-21 NOTE — ASSESSMENT & PLAN NOTE
Multifactorial.  Multiple causes including multiple admissions in the past month with deconditioning, infection, vitamin B 12 and vitamin D deficiency, underlying depression, medication side effect, uncontrolled hypothyroidism with elevated TSH, anemia. Start vitamin B12 supplementation as well as vitamin D supplementation. Avoid sedated agents, discontinue Seroquel. Decrease gabapentin from 300mg to 200mg HS. Optimization of thyroid function, she had dose of levothyroxine increased continue the same. Follow-up in 1 month.

## 2023-09-21 NOTE — PROGRESS NOTES
Assessment/Plan:    Vitamin D deficiency  Not on supplementation. Will start 2000 units daily. Urinary retention  Currently catheterizing 1x a day with minimal volume of 75ml or less, oriented every other day to minimize risk of infection and if volume more than 200ml at one time to start once a day again. Vitamin B12 deficiency  Vitamin B12 on lower end of normal 234, will start B-12 inj once a month for at least 6 months. Other fatigue  Multifactorial.  Multiple causes including multiple admissions in the past month with deconditioning, infection, vitamin B 12 and vitamin D deficiency, underlying depression, medication side effect, uncontrolled hypothyroidism with elevated TSH, anemia. Start vitamin B12 supplementation as well as vitamin D supplementation. Avoid sedated agents, discontinue Seroquel. Decrease gabapentin from 300mg to 200mg HS. Optimization of thyroid function, she had dose of levothyroxine increased continue the same. Follow-up in 1 month. Diagnoses and all orders for this visit:    Vitamin B12 deficiency  -     cyanocobalamin injection 1,000 mcg    Essential hypertension    B12 neuropathy (HCC)    Vitamin D deficiency  -     cholecalciferol (VITAMIN D3) 1,000 units tablet; Take 2 tablets (2,000 Units total) by mouth daily    RLS (restless legs syndrome)  -     gabapentin (Neurontin) 100 mg capsule; Take 2 capsules (200 mg total) by mouth daily at bedtime    Other fatigue    Other orders  -     Lactobacillus (PROBIOTIC ACIDOPHILUS PO); Take 1 capsule by mouth        Follow up in 1 month. Subjective:      Patient ID: Lizabeth Jimenez is a 75 y.o. female. Patient presents in the office for transition of care after admission in the hospital for COPD exacerbation. She received treatment with nebulizers and no need for further antibiotics or steroid after discharge. She was recommended to follow-up with us and with pulmonary.   She also had an admission prior to this with a UTI and altered mental status. Patient recently saw nephrology, her kidney function has been stable but she is having more fatigue lately. In the past 3 weeks she states that she wakes up and feels good for about 1h and then she feels tired and ready to go back to sleep. Patient has been doing catheterization once daily, but only getting 75 mL or less of urine. I reviewed technique with her  and he is doing the most clean way possible. The following portions of the patient's history were reviewed and updated as appropriate: allergies, current medications, past family history, past medical history, past social history, past surgical history and problem list.    Review of Systems   Constitutional: Positive for fatigue. Negative for appetite change. HENT: Positive for congestion and rhinorrhea. Eyes: Negative for visual disturbance. Respiratory: Positive for cough. Negative for chest tightness, shortness of breath and wheezing. Cardiovascular: Negative for chest pain, palpitations and leg swelling. Gastrointestinal: Negative for abdominal pain, nausea and vomiting. Genitourinary: Negative for difficulty urinating and frequency. Musculoskeletal: Negative for arthralgias and joint swelling. Skin: Negative for rash. Neurological: Negative for dizziness and headaches. Psychiatric/Behavioral: Positive for dysphoric mood and sleep disturbance. Negative for confusion. TCM Call     Date and time call was made  9/15/2023  8:35 AM    Hospital care reviewed  Records reviewed    Patient was hospitialized at  [address] [place] [address]    Date of Admission  09/12/23    Date of discharge  09/14/23    Diagnosis  uti, low oxygen    Disposition  Home    Were the patients medications reviewed and updated  Yes    Current Symptoms  Fatigue    Dizziness severity  Mild    Fatigue severity  Mild      TCM Call     Post hospital issues  None    Should patient be enrolled in anticoag monitoring?   No    Scheduled for follow up? Yes    Patients specialists  Nephrologist    Referrals needed  Bradley Magdaleno MA     Did you obtain your prescribed medications  Yes    Do you need help managing your prescriptions or medications  No    Is transportation to your appointment needed  No    I have advised the patient to call PCP with any new or worsening symptoms  Natalia HECTOR LPN    Living Arrangements  Family members    Support System  Family    The type of support provided  Emotional; Financial; Physical    Do you have social support  Yes, as much as I need    Are you recieving any outpatient services  No    Are you recieving home care services  No    Are you using any community resources  No    Current waiver services  No    Have you fallen in the last 12 months  Yes    How many times  4    Interperter language line needed  No    Counseling  Family    Counseling topics  instructions for management          Objective:      /88 (BP Location: Left arm, Patient Position: Sitting, Cuff Size: Standard)   Pulse 66   Temp 99.4 °F (37.4 °C)   Resp 20   Wt 76.9 kg (169 lb 9.6 oz)   SpO2 92%   BMI 30.53 kg/m²          Physical Exam  Vitals and nursing note reviewed. Constitutional:       General: She is not in acute distress. Appearance: She is well-developed. She is obese. HENT:      Head: Normocephalic and atraumatic. Eyes:      Conjunctiva/sclera: Conjunctivae normal.      Pupils: Pupils are equal, round, and reactive to light. Neck:      Thyroid: No thyromegaly. Cardiovascular:      Rate and Rhythm: Normal rate and regular rhythm. Heart sounds: Normal heart sounds. Pulmonary:      Effort: Pulmonary effort is normal. No respiratory distress. Breath sounds: Normal breath sounds. No wheezing. Abdominal:      General: There is no distension. Tenderness: There is no abdominal tenderness. Musculoskeletal:         General: No swelling. Normal range of motion.       Cervical back: Normal range of motion and neck supple. Skin:     General: Skin is warm and dry. Capillary Refill: Capillary refill takes less than 2 seconds. Coloration: Skin is pale. Neurological:      Mental Status: She is alert and oriented to person, place, and time. Mental status is at baseline. Sensory: No sensory deficit. Motor: No abnormal muscle tone. Psychiatric:         Thought Content:  Thought content normal.         Judgment: Judgment normal.

## 2023-09-21 NOTE — ASSESSMENT & PLAN NOTE
Currently catheterizing 1x a day with minimal volume of 75ml or less, oriented every other day to minimize risk of infection and if volume more than 200ml at one time to start once a day again.

## 2023-09-22 DIAGNOSIS — I10 ESSENTIAL HYPERTENSION: Primary | ICD-10-CM

## 2023-09-22 DIAGNOSIS — R80.1 PERSISTENT PROTEINURIA: ICD-10-CM

## 2023-09-22 DIAGNOSIS — N18.4 STAGE 4 CHRONIC KIDNEY DISEASE (HCC): ICD-10-CM

## 2023-09-22 DIAGNOSIS — I12.9 PARENCHYMAL RENAL HYPERTENSION, STAGE 1 THROUGH STAGE 4 OR UNSPECIFIED CHRONIC KIDNEY DISEASE: ICD-10-CM

## 2023-09-22 RX ORDER — LOSARTAN POTASSIUM 25 MG/1
25 TABLET ORAL DAILY
Qty: 30 TABLET | Refills: 5 | Status: SHIPPED | OUTPATIENT
Start: 2023-09-22

## 2023-09-22 NOTE — TELEPHONE ENCOUNTER
----- Message from Deedee Rain MD sent at 9/21/2023  2:58 PM EDT -----  Besides a low iron and elevated WBC Labs look good  ----- Message -----  From: Lab, Background User  Sent: 9/21/2023  11:14 AM EDT  To: Deedee Rain MD

## 2023-09-22 NOTE — TELEPHONE ENCOUNTER
----- Message from Charla Gibson MD sent at 9/22/2023  7:23 AM EDT -----  Add back losartan 25 mg daily  Repeat a basic metabolic profile 1 to 2 weeks later  Check blood pressures 2 to 3 weeks  ----- Message -----  From: Lab, Background User  Sent: 9/21/2023  11:14 AM EDT  To: Charla Gibson MD

## 2023-09-22 NOTE — TELEPHONE ENCOUNTER
Spoke with patient about the following, she expressed understanding and thanked us for the call:    Besides a low iron and elevated WBC Labs look good  Add back losartan 25 mg daily  Repeat a basic metabolic profile 1 to 2 weeks later  Check blood pressures 2 to 3 weeks

## 2023-09-23 LAB — BACTERIA UR CULT: ABNORMAL

## 2023-09-25 DIAGNOSIS — R39.9 UTI SYMPTOMS: Primary | ICD-10-CM

## 2023-09-25 RX ORDER — NITROFURANTOIN 25; 75 MG/1; MG/1
100 CAPSULE ORAL 2 TIMES DAILY
Qty: 10 CAPSULE | Refills: 0 | Status: SHIPPED | OUTPATIENT
Start: 2023-09-25 | End: 2023-09-30

## 2023-09-25 NOTE — TELEPHONE ENCOUNTER
----- Message from Roz Bo MD sent at 9/25/2023  8:02 AM EDT -----  I will place the patient on Macrobid 100 mg twice daily for 5 days  ----- Message -----  From: Lab, Background User  Sent: 9/22/2023   2:16 PM EDT  To: Roz Bo MD

## 2023-09-26 ENCOUNTER — EVALUATION (OUTPATIENT)
Dept: PHYSICAL THERAPY | Facility: CLINIC | Age: 73
End: 2023-09-26
Payer: MEDICARE

## 2023-09-26 DIAGNOSIS — G89.29 CHRONIC PAIN OF RIGHT KNEE: Primary | ICD-10-CM

## 2023-09-26 DIAGNOSIS — M25.561 CHRONIC PAIN OF RIGHT KNEE: Primary | ICD-10-CM

## 2023-09-26 DIAGNOSIS — R26.9 GAIT ABNORMALITY: ICD-10-CM

## 2023-09-26 PROCEDURE — 97110 THERAPEUTIC EXERCISES: CPT | Performed by: PHYSICAL THERAPIST

## 2023-09-26 PROCEDURE — 97112 NEUROMUSCULAR REEDUCATION: CPT | Performed by: PHYSICAL THERAPIST

## 2023-09-26 PROCEDURE — 97161 PT EVAL LOW COMPLEX 20 MIN: CPT | Performed by: PHYSICAL THERAPIST

## 2023-09-26 NOTE — PROGRESS NOTES
PT Evaluation     Today's date: 2023  Patient name: Mame Cohen  : 1950  MRN: [id number]  Referring provider: Beau Mckinley MD  Dx:   Encounter Diagnosis     ICD-10-CM    1. Chronic pain of right knee  M25.561 Ambulatory Referral to Physical Therapy    G89.29       2. Gait abnormality  R26.9                      Assessment  Assessment details: Pt presents with signs and symptoms synonymous of admitting diagnosis as well as mechanical diagnosis of knee derangement with DP of repeated semi loaded knee ext. Pt presents with pain, decreased strength, decreased range, flexibility, as well as tolerance to activity and is a fall risk per subjective and objective measurements. Pt would benefit skilled PT intervention in order to address these impairments in order to be able to perform all desired activities with minimal to nil symptom exacerbation. Thank you very much for this kind referral.     Impairments: abnormal gait, abnormal or restricted ROM, activity intolerance, impaired balance, impaired physical strength, lacks appropriate home exercise program, pain with function, poor posture  and poor body mechanics  Understanding of Dx/Px/POC: good   Prognosis: good    Goals  STG 4 Weeks:  Decrease pain at worst to 5  Improve range to Ext R knee to -5  Improve strength to improve b/l LE to 4/5 . Independent with HEP  LTG 8 Weeks:  Decrease pain at worst to 2  Improve range to improve ext R knee to 0  Improve strength to improve B/L LE to 5/5.    Able to perform all desired activities with minimal to nil symptom exacerbation      Plan  Patient would benefit from: skilled physical therapy  Planned modality interventions: cryotherapy and thermotherapy: hydrocollator packs  Planned therapy interventions: joint mobilization, manual therapy, neuromuscular re-education, patient education, postural training, self care, strengthening, stretching, therapeutic activities, therapeutic exercise, therapeutic training, transfer training, IADL retraining, home exercise program, graded motor, graded exercise, graded activity, gait training, functional ROM exercises, flexibility, abdominal trunk stabilization, activity modification, behavior modification, body mechanics training and balance  Frequency: 2x week  Duration in weeks: 10  Treatment plan discussed with: patient        Subjective Evaluation    History of Present Illness  Date of onset: 9/26/2023  Mechanism of injury: Pt is a 75 yofemale who presents today with her  Cher Tripathi stating that she has been having a few falls this past year, states that she has suffered some injuries to face and ribs secondary to this. States that she has been attending hospital care in between these falls, also having a history of UTI that can cause confusion, she will be following up with neurology in the near future. Pt is also scheduled for OT at [facility] for special neurological examination for this memory challenges. Pt reports that her primary reason for presentation today is due to R knee pain. Pt reports approximately 5 + years ago she did have arthroscopic surgery. Pt reports that she did have a long history of respiratory therapy so she was always standing and walking. But she remembers putting in new miranda in her home 9 years ago and this exacerbated her R knee pain. Pt reports that this is likely what lead to surgery. Pt reports knee surgery did assist and hadn't had any issues. Pt reports that with her fall in March that resulted in multiple injuries may have re-injured the R knee and has been experiencing symptoms since. Pt reports the pain is 0 at best with rest and at worst can be a 9/10 sharp pain in the inside of her knee. Pt reports that there is occasional swelling. Pt denies any other R LE symptoms except some stiffness in the AM, but improves with mobility, no where near as significant on the L LE.   Pt reports no recent change in bowel or bladder, but does have a strong history of UTIs. Pt reports no numbness in feet, but does have a history of low back pain. Pt reports that there is intermittent low back that has had traveled to legs but not recent. Pt reports that goals are to reduce pain while standing and walking, improve balance as she has found this has declined over the last 6-9 months, as well as overall endurance. Quality of life: good    Patient Goals  Patient goals for therapy: decreased pain, improved balance, increased motion, increased strength and return to sport/leisure activities    Pain  Current pain ratin  At best pain ratin  At worst pain ratin  Quality: dull ache and sharp  Relieving factors: rest and change in position  Aggravating factors: standing, walking and stair climbing  Progression: no change    Social Support  Steps to enter house: yes  Stairs in house: yes (first floor)   Lives in: multiple-level home  Lives with: adult children (Son is Obinna Manzo )      Diagnostic Tests  No diagnostic tests performed        Objective     Active Range of Motion   Left Knee   Flexion: 130 (PT OP Nil) degrees   Extension: 0 (PT OP nil) degrees     Right Knee   Flexion: 135 (PT OP) degrees   Extension: -10 (PT OP -5) degrees with pain    Additional Active Range of Motion Details  Forward head, rounded shoulders, Lordosis  Genu valgum R > L, antalgia. B/L Sensation intact to L3,4,5,S1,(S2 L < R)   DTR Patella Ashaway  Postural correction   Repeated motion   Flex WFL  Ext Max*  SB R Mod*  SB L Mod*   LE Strength  Hip   L Flex 3+ Ext 5 Abd 4- Add 4  R Flex 4 Ext 5 Abd 4- Add 4  LE Screen  Strong and painless. Joint Mobility  Palpation  Sts  TUG:   15.4"  Medial knee pain  Walking     LE Repeated Movements  Unloaded knee ext  NE  Semi loaded P NW initially, then better, improved walking and TKE achievement.               Precautions: Falls, Dm2, COPD, HTN Osteoporosis, Anemia Hx, Fibromyalgia Hx, R knee Sx Arthroscopic >5 years, Hx of Skin CA. Manuals 9/26                                                                Neuro Re-Ed             Education and progression 10 Min            NBOS EC             NBOS EO Foam             Side Steps             AP PA Walking             Cone Step over                          Ther Ex             Semi loaded knee ext D B 4 x 10            Progression?                                        HR/TR             Hip Abd + Ext                                       Ther Activity             Knee Ext ROM B, achieved TKE            Walking B            Other Concerns             squatting             Sit<>Stands             Modalities             CP/HP PRN

## 2023-09-27 ENCOUNTER — EVALUATION (OUTPATIENT)
Facility: CLINIC | Age: 73
End: 2023-09-27
Payer: MEDICARE

## 2023-09-27 DIAGNOSIS — R41.3 MEMORY DIFFICULTIES: Primary | ICD-10-CM

## 2023-09-27 PROCEDURE — 97166 OT EVAL MOD COMPLEX 45 MIN: CPT

## 2023-09-27 NOTE — PROGRESS NOTES
OCCUPATIONAL THERAPY INITIAL EVALUATION    Today's Date: 2023  Patient Name: Prabha Willis  : 1950  MRN: [id number]  Referring Provider: Oswald Cano PA-C  Dx: Memory difficulties [R41.3]    SKILLED ANALYSIS:  Pt is a 75year old female presenting to OP occupational therapy s/p altered mental status due to UTI. Pt currently requires assistance with minor daily tasks when in the midst of confusion episodes (worsened by UTI) however otherwise is independent. Pt is demonstrating deficits in the following domains: sustained attention, divided attention, immediate and delayed recall, STM, LTM, and recall. Deficits are noted based on the following assessments: MoCA 8.1, CMT, Trail Making Part A, and clinical observation. Recommend OP OT 2x/wk for 8-12 weeks with focus on aforementioned deficits to maximize functional recovery and improve QOL. Findings and recommendations discussed with pt, and they are in agreement. Educated pt on charges of insurance, acknowledge understanding, and are in agreement. PLEASE COMPLETE VISION SCREEN (PT DIAGNOSED WITH CATARACTS AND WAS RECOMMENDED SURGERY HOWEVER HAS NOT RECEIVED IT) AND MVPT NEXT SESSION. PLAN OF CARE START:23  PLAN OF CARE END: 2023  FREQUENCY: 2 times a week  PRECAUTIONS Memory deficits, FALL RISK      Subjective    Mechanism of Injury  Pt and her , Connie Zamarripa, began to notice minor memory difficulties a few years ago. Pt has appointment in 2 weeks with neurology (first time seeing them was 2 years ago with no findings). Connie Zamarripa reports that since she sustained a nose and orbital injury with a concussion post-fall in March of this year, things have been worse. Pt's  reports that she has been frequently getting UTI's and has had multiple in the last month. Increased confusion and altered mental status is usual for her during UTIs. Upon discharge of a recent hospital visit, PA noted possible signs of dementia.  Pt was recommended self catheterization due to her inability to fully empty bladder however her and her  are not on board due to risks of infection. Occupational Profile  Pt reports she lives with her adult son and . Pt reports she forgets things like movies watched even 2 days prior and has difficulty with short term memory as she forgets things that happened 2 minutes ago including what she was talking about and will ask the same question she asked a few minutes prior. Pt reports every once in a while when she is, "out of it" (increased confusion), her  has to dress her. Pt  reports that even when she is not confused, she has memory difficulties. Pt reports occasional dizziness with 5 falls in the last year. Pt lives in a 2 story house and lives on the first floor and everything is on first floor including laundry and kitchen   1 eight inch step to enter home. Pt's  reports she occasionally uses cane and rollater. Pt will be working on balance and R knee pain with PT. PATIENT GOAL: "It would be nice if I could remember things normal people remember.  It seems like things that are normal like movies or the ball game, it would be nice if I could remember things."    HISTORY OF PRESENT ILLNESS:     PMH:   Past Medical History:   Diagnosis Date   • Actinic keratosis    • Acute cystitis without hematuria 04/28/2023   • Acute cystitis without hematuria 04/28/2023   • Acute-on-chronic kidney injury [facility])    • ANDRA (acute kidney injury) ([facility]) 05/08/2020   • Allergic    • Allergic rhinitis    • Anesthesia     pt reports "had to use double lumen endo tube for hiatal hernia repair/so surgeon could get to where he needed to work"   • Arthritis    • Aspiration into airway    • Ahumada esophagus 1993    Lot of stress with children   • Basal cell carcinoma 2007    left cheek    • BCC (basal cell carcinoma) 05/27/2021    Left Nasal tip   • Breast discharge 06/19/2023   • Breast pain 06/19/2023   • Cancer ([address])     squamous cell cancer on forhead   • Cancer (HCC)     basal cell on nose    • Cholelithiasis    • Chronic kidney disease 2000, 2018    Stones, kidney disease stage 4   • Chronic pain disorder     bilat feet and joint pain on occas   • Colon polyp    • Constipation    • COPD (chronic obstructive pulmonary disease) ([address])    • COVID-19 08/2021    recovered at home/did receive monoclonal infusion   • COVID-19 virus infection 08/16/2021   • Dental bridge present    • Depression    • Diabetes mellitus ([address])     Type 2   • Diabetic neuropathy (HCC)    • Disease of thyroid gland    • Dyspnea    • Elevated liver enzymes 04/04/2023   • Epigastric abdominal pain with severe diabetic gastroparesis, status post EGD/Botox injection, 5/14 05/17/2021   • Facial abrasion, initial encounter 03/22/2023   • Fall 03/22/2023   • Family history of thyroid problem    • Fatty liver    • Fracture of orbital floor, blow-out, right, closed, with routine healing, subsequent encounter 03/22/2023   • GERD (gastroesophageal reflux disease)    • Heart burn    • Hiatal hernia    • History of colon polyps 07/30/2021   • History of kidney stones 09/29/2020    Hx of recurrent kidney stones   • History of kidney stones 09/29/2020    Hx of recurrent kidney stones  Formatting of this note might be different from the original. Hx of recurrent kidney stones  Last Assessment & Plan:  Formatting of this note might be different from the original. We will set her up for follow-up in 3 months with a KUB prior. She knows to call if she has any kidney stone type pain in the meantime.    • History of pneumonia    • History of repair of hiatal hernia 05/03/2020   • Hyperlipidemia    • Hyperplasia, parathyroid ([address])    • Hypertension    • Hyponatremia 4/26/2023   • Hypotension 04/13/2022   • Kidney problem    • Kidney stone    • Leukocytosis 7/14/2020   • Memory loss Julu2 2020   • Motion sickness    • Motion sickness    • Multiple closed fractures of ribs of right side 03/22/2023   • Nasal bones, closed fracture 03/22/2023   • Neck pain    • Obesity 1978   • Obesity (BMI 30.0-34. 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, left     great toe fusion   • Urinary incontinence    • Urinary tract infection 03/28/2022   • Wears glasses        Past Surgical Hx:   Past Surgical History:   Procedure Laterality Date   • ABDOMINAL SURGERY  [id number]    Nissen x2 1972 tubal ligarion   • ARTHROSCOPY KNEE     • BREAST BIOPSY      stereotactic-benign   • BREAST BIOPSY      stereo-benign   • BREAST CYST EXCISION Bilateral     benign   • BREAST EXCISIONAL BIOPSY      unknown date-benign   • BREAST EXCISIONAL BIOPSY      unknown date-benign   • BREAST EXCISIONAL BIOPSY      unknown date-benign   • BREAST EXCISIONAL BIOPSY      unknown age-benign   • CARDIAC CATHETERIZATION     • CHOLECYSTECTOMY     • COLONOSCOPY     • EXAMINATION UNDER ANESTHESIA N/A 06/24/2021    Procedure: EXAM UNDER ANESTHESIA (EUA), DISE;  Surgeon: Yaneth Ahn MD;  Location: [facility] MA[facility] OR;  Service: ENT   • HERNIA REPAIR     • HIATAL HERNIA REPAIR     • KNEE SURGERY      Torn maniscus lap surg   • LIPOSUCTION     • LYMPH NODE BIOPSY     • MOHS SURGERY  05/20/2021    left mid forehead-Gautam   • MOHS SURGERY Left 05/27/2021    Left nasal tip- gautam    • MO LIGATION/BIOPSY TEMPORAL ARTERY Left 01/05/2023    Procedure: BIOPSY ARTERY TEMPORAL;  Surgeon: Sarah Tay DO;  Location: [facility] Main [facility];  Service: Vascular   • MO OPEN IMPLANTATION CRANIAL NERVE VASQUEZ & PULSE GEN N/A 11/10/2021    Procedure: INSERTION UPPER AIRWAY STIMULATOR, INSPIRE IMPLANT;  Surgeon: Stefany Bailey MD;  Location[facility];  Service: ENT   • VA UNLISTED PROCEDURE FOOT/TOES Right 2022    Procedure: 1st metatarsal phalangeal joint fusion;  Surgeon: Reginaldo Morocho DPM;  Location[facility];  Service: Podiatry   • REDUCTION MAMMAPLASTY Bilateral 2000   • REDUCTION MAMMAPLASTY     • SKIN BIOPSY     • SKIN CANCER EXCISION      squamous cell carcinoma    • SKIN CANCER EXCISION      basal cell carcinoma   • SKIN CANCER EXCISION      SCCIS chest   • SQUAMOUS CELL CARCINOMA EXCISION     • TOE SURGERY Right 2022    Right Great Toe Fusion   • TONSILLECTOMY     • TUBAL LIGATION     • UPPER GASTROINTESTINAL ENDOSCOPY     • US GUIDED BREAST BIOPSY RIGHT COMPLETE Right 2022        Pain: pt denies pain     Objective    Functional Cognition:  Highest level of education: graduated     [place] Cognitive Assessment Version 8.1 (MoCA V8.1)  Visuospatial/executive functionin/5  Namin/3  Memory: 1st trial:  4/5, 2nd trial:  4/5  Attention/concentration: 0/2  List of letters: 0/1  Serial Seven Subtraction:  0/3 w/ 0 errors  Language/sentence repetition:  1/2  Language Fluency:  0/1  Abstract/Correlational Thinkin/2  Delayed Recall:  0/5  Orientation:  3/6   Memory Index Score: 3/15  MoCA V1 8.1 Raw Score:  30, MIS:  3/15, indicative of SEVERE neurocognitive impairments.     MoCA Scoring        Normal: 26+         Mild Cognitive Impairment: 18-25          Moderate Cognitive Impairment: 10-17         Severe Cognitive Impairment: <10    Trail making Part A and Part B:   Part A: 5 minutes with 8 visual and verbal cues; pt unable to complete task after 5 minutes therefore terminated when she reached number 20  Part B: not completed today due to increased difficulty with Part A  Indicating deficits: Part A > 40.13 seconds and Part B > 86.27 seconds      Contextual Memory Test:  Immediate: , 0 confabulations  Delayed: 3/20, 0 confabulations        GOALS:   Short Term Goals:  Pt will increase sustained attention by being able to complete trail making part A in 3 minutes and 30 seconds with 3 verbal/visual cues to complete IADLs. Pt will increase delayed recall and recall 2/5 items on MOCA to complete IADLs  Pt will follow 3 step verbal directions in semimodal environment for improved role performance and engagement in salient tasks  Pt will increase auditory processing speed for direction following requiring repetition of directions <50% of the time for improved role performance and engagement in salient tasks  Pt will be alert and oriented x 4 50% of the time to inc temporal orientation for appointment keeping and safe IADL practice  Pt will demo increased insight into deficits x 75% for overall increased safety and self awareness with ADL/IADL tasks and to encourage use of compensatory strategies        Long Term Goals: 8-12 weeks  Pt will maintain attention to task for 5 minutes in multimodal environment for baseline performance,  improved role performance and to improve learning and to simulate return to life environment  Pt will demo ability to participate in dual tasking/divided attention task with 75% accuracy in multimodal environment to simulate return to life roles  Pt will demo ability to alternate attention between 2 tasks with cog loading and 75% accuracy with G retention of task directions in multimodal environment to simulate return to life roles. Pt will demonstrate an increase in Memory Index Score on the MoCA to 6/15 to improve delayed recall. OTHER PLANNED THERAPY INTERVENTIONS:   Internal and external memory aides  Multimatrix for saccades/ visual clutter/attention  Hypersensitivity strategies education  Multi-modal environment  Sustained/alternating/divided attention  Tracking tube  Oculomotor control:  saccades, con/divergence  Conv./div.  Dynamic tasks  Work stations with timed transitions  Temporal Awareness  Memory and mental manipulation  Auditory processing with immediate recall  Memory retention with immediate and delayed recall  Edu on cog/vision apps

## 2023-09-27 NOTE — LETTER
2023    Thuy Gillis, [address] [facility] [address] [facility] [address]    Patient: Bi Davis   YOB: 1950   Date of Visit: 2023     Encounter Diagnosis     ICD-10-CM    1. Memory difficulties  R41.3           Dear Dr. Deisy Garcias:    Thank you for your recent referral of Bi Davis. Please review the attached evaluation summary from Gabriella's recent visit. Please verify that you agree with the plan of care by signing the attached order. If you have any questions or concerns, please do not hesitate to call. I sincerely appreciate the opportunity to share in the care of one of your patients and hope to have another opportunity to work with you in the near future. Sincerely,    Arlet Hess, OT      Referring Provider:     I certify that I have read the below Plan of Care and certify the need for these services furnished under this plan of treatment while under my care. Thuy Gillis MD  [address] [address]  Via In Basket        OCCUPATIONAL THERAPY INITIAL EVALUATION    Today's Date: 2023  Patient Name: Bi Davis  : 1950  MRN: [id number]  Referring Provider: Saima Arroyo PA-C  Dx: Memory difficulties [R41.3]    SKILLED ANALYSIS:  Pt is a 75year old female presenting to OP occupational therapy s/p altered mental status due to UTI. Pt currently requires assistance with minor daily tasks when in the midst of confusion episodes (worsened by UTI) however otherwise is independent. Pt is demonstrating deficits in the following domains: sustained attention, divided attention, immediate and delayed recall, STM, LTM, and recall. Deficits are noted based on the following assessments: MoCA 8.1, CMT, Trail Making Part A, and clinical observation. Recommend OP OT 2x/wk for 8-12 weeks with focus on aforementioned deficits to maximize functional recovery and improve QOL.   Findings and recommendations discussed with pt, and they are in agreement. Educated pt on charges of insurance, acknowledge understanding, and are in agreement. PLEASE COMPLETE VISION SCREEN (PT DIAGNOSED WITH CATARACTS AND WAS RECOMMENDED SURGERY HOWEVER HAS NOT RECEIVED IT) AND MVPT NEXT SESSION. PLAN OF CARE START:9/27/23  PLAN OF CARE END: 12/27/2023  FREQUENCY: 2 times a week  PRECAUTIONS Memory deficits, FALL RISK      Subjective    Mechanism of Injury  Pt and her , Blanca Sanders, began to notice minor memory difficulties a few years ago. Pt has appointment in 2 weeks with neurology (first time seeing them was 2 years ago with no findings). Blanca Sanders reports that since she sustained a nose and orbital injury with a concussion post-fall in March of this year, things have been worse. Pt's  reports that she has been frequently getting UTI's and has had multiple in the last month. Increased confusion and altered mental status is usual for her during UTIs. Upon discharge of a recent hospital visit, PA noted possible signs of dementia. Pt was recommended self catheterization due to her inability to fully empty bladder however her and her  are not on board due to risks of infection. Occupational Profile  Pt reports she lives with her adult son and . Pt reports she forgets things like movies watched even 2 days prior and has difficulty with short term memory as she forgets things that happened 2 minutes ago including what she was talking about and will ask the same question she asked a few minutes prior. Pt reports every once in a while when she is, "out of it" (increased confusion), her  has to dress her. Pt  reports that even when she is not confused, she has memory difficulties. Pt reports occasional dizziness with 5 falls in the last year. Pt lives in a 2 story house and lives on the first floor and everything is on first floor including laundry and kitchen   1 eight inch step to enter home.  Pt's  reports she occasionally uses cane and rollater. Pt will be working on balance and R knee pain with PT. PATIENT GOAL: "It would be nice if I could remember things normal people remember.  It seems like things that are normal like movies or the ball game, it would be nice if I could remember things."    HISTORY OF PRESENT ILLNESS:     PMH:   Past Medical History:   Diagnosis Date   • Actinic keratosis    • Acute cystitis without hematuria 04/28/2023   • Acute cystitis without hematuria 04/28/2023   • Acute-on-chronic kidney injury [facility])    • ANDRA (acute kidney injury) ([address]) 05/08/2020   • Allergic    • Allergic rhinitis    • Anesthesia     pt reports "had to use double lumen endo tube for hiatal hernia repair/so surgeon could get to where he needed to work"   • Arthritis    • Aspiration into airway    • Ahumada esophagus 1993    Lot of stress with children   • Basal cell carcinoma 2007    left cheek    • BCC (basal cell carcinoma) 05/27/2021    Left Nasal tip   • Breast discharge 06/19/2023   • Breast pain 06/19/2023   • Cancer ([address])     squamous cell cancer on forhead   • Cancer ([address])     basal cell on nose    • Cholelithiasis    • Chronic kidney disease 2000, 2018    Stones, kidney disease stage 4   • Chronic pain disorder     bilat feet and joint pain on occas   • Colon polyp    • Constipation    • COPD (chronic obstructive pulmonary disease) ([address])    • COVID-19 08/2021    recovered at home/did receive monoclonal infusion   • COVID-19 virus infection 08/16/2021   • Dental bridge present    • Depression    • Diabetes mellitus ([address])     Type 2   • Diabetic neuropathy ([address])    • Disease of thyroid gland    • Dyspnea    • Elevated liver enzymes 04/04/2023   • Epigastric abdominal pain with severe diabetic gastroparesis, status post EGD/Botox injection, 5/14 05/17/2021   • Facial abrasion, initial encounter 03/22/2023   • Fall 03/22/2023   • Family history of thyroid problem    • Fatty liver    • Fracture of orbital floor, blow-out, right, closed, with routine healing, subsequent encounter 03/22/2023   • GERD (gastroesophageal reflux disease)    • Heart burn    • Hiatal hernia    • History of colon polyps 07/30/2021   • History of kidney stones 09/29/2020    Hx of recurrent kidney stones   • History of kidney stones 09/29/2020    Hx of recurrent kidney stones  Formatting of this note might be different from the original. Hx of recurrent kidney stones  Last Assessment & Plan:  Formatting of this note might be different from the original. We will set her up for follow-up in 3 months with a KUB prior. She knows to call if she has any kidney stone type pain in the meantime. • History of pneumonia    • History of repair of hiatal hernia 05/03/2020   • Hyperlipidemia    • Hyperplasia, parathyroid ([address])    • Hypertension    • Hyponatremia 4/26/2023   • Hypotension 04/13/2022   • Kidney problem    • Kidney stone    • Leukocytosis 7/14/2020   • Memory loss Julu2 2020   • Motion sickness    • Motion sickness    • Multiple closed fractures of ribs of right side 03/22/2023   • Nasal bones, closed fracture 03/22/2023   • Neck pain    • Obesity 1978   • Obesity (BMI 30.0-34. 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, left     great toe fusion   • Urinary incontinence    • Urinary tract infection 03/28/2022   • Wears glasses        Past Surgical Hx:   Past Surgical History:   Procedure Laterality Date   • ABDOMINAL SURGERY  [id number]    Nissen x2 1972 tubal ligarion   • ARTHROSCOPY KNEE     • BREAST BIOPSY      stereotactic-benign   • BREAST BIOPSY      stereo-benign   • BREAST CYST EXCISION Bilateral     benign   • BREAST EXCISIONAL BIOPSY      unknown date-benign   • BREAST EXCISIONAL BIOPSY      unknown date-benign   • BREAST EXCISIONAL BIOPSY      unknown date-benign   • BREAST EXCISIONAL BIOPSY      unknown age-benign   • CARDIAC CATHETERIZATION     • CHOLECYSTECTOMY     • COLONOSCOPY     • EXAMINATION UNDER ANESTHESIA N/A 06/24/2021    Procedure: EXAM UNDER ANESTHESIA (EUA), DISE;  Surgeon: Stefany Bailey MD;  [facility];  Service: ENT   • HERNIA REPAIR     • HIATAL HERNIA REPAIR     • KNEE SURGERY      Torn maniscus lap surg   • LIPOSUCTION     • LYMPH NODE BIOPSY     • MOHS SURGERY  05/20/2021    left mid forehead-Gautam   • MOHS SURGERY Left 05/27/2021    Left nasal tip- gautam    • AR LIGATION/BIOPSY TEMPORAL ARTERY Left 01/05/2023    Procedure: BIOPSY ARTERY TEMPORAL;  Surgeon: Leroy Miller DO;  [facility];  Service: Vascular   • AR OPEN IMPLANTATION CRANIAL NERVE VASQUEZ & PULSE GEN N/A 11/10/2021    Procedure: INSERTION UPPER AIRWAY STIMULATOR, INSPIRE IMPLANT;  Surgeon: Stefany Bailey MD;  Location[facility];  Service: ENT   • AR UNLISTED PROCEDURE FOOT/TOES Right 07/19/2022    Procedure: 1st metatarsal phalangeal joint fusion;  Surgeon: Reginaldo Morocho DPM;  [facility];  Service: Podiatry   • REDUCTION MAMMAPLASTY Bilateral 2000   • REDUCTION MAMMAPLASTY     • SKIN BIOPSY     • SKIN CANCER EXCISION  2007    squamous cell carcinoma    • SKIN CANCER EXCISION  2007    basal cell carcinoma   • SKIN CANCER EXCISION  2023    SCCIS chest   • SQUAMOUS CELL CARCINOMA EXCISION     • TOE SURGERY Right 08/04/2022    Right Great Toe Fusion   • TONSILLECTOMY     • TUBAL LIGATION     • UPPER GASTROINTESTINAL ENDOSCOPY     • US GUIDED BREAST BIOPSY RIGHT COMPLETE Right 07/18/2022        Pain: pt denies pain     Objective    Functional Cognition:  Highest level of education: graduated HS    [place] Cognitive Assessment Version 8.1 (MoCA V8.1)  Visuospatial/executive functionin/5  Namin/3  Memory: 1st trial:  4/5, 2nd trial:  4/5  Attention/concentration: 0/2  List of letters: 0/1  Serial Seven Subtraction:  0/3 w/ 0 errors  Language/sentence repetition:  1/2  Language Fluency:  0/1  Abstract/Correlational Thinkin/2  Delayed Recall:  0/5  Orientation:  3/6   Memory Index Score: 3/15  MoCA V1 8.1 Raw Score:  , MIS:  3/15, indicative of SEVERE neurocognitive impairments. MoCA Scoring        Normal: 26+         Mild Cognitive Impairment: 18-25          Moderate Cognitive Impairment: 10-17         Severe Cognitive Impairment: <10    Trail making Part A and Part B:   Part A: 5 minutes with 8 visual and verbal cues; pt unable to complete task after 5 minutes therefore terminated when she reached number 20  Part B: not completed today due to increased difficulty with Part A  Indicating deficits: Part A > 40.13 seconds and Part B > 86.27 seconds      Contextual Memory Test:  Immediate: , 0 confabulations  Delayed: 3/20, 0 confabulations        GOALS:   Short Term Goals:  Pt will increase sustained attention by being able to complete trail making part A in 3 minutes and 30 seconds with 3 verbal/visual cues to complete IADLs.   Pt will increase delayed recall and recall 2/5 items on MOCA to complete IADLs  Pt will follow 3 step verbal directions in semimodal environment for improved role performance and engagement in salient tasks  Pt will increase auditory processing speed for direction following requiring repetition of directions <50% of the time for improved role performance and engagement in salient tasks  Pt will be alert and oriented x 4 50% of the time to inc temporal orientation for appointment keeping and safe IADL practice  Pt will demo increased insight into deficits x 75% for overall increased safety and self awareness with ADL/IADL tasks and to encourage use of compensatory strategies        Long Term Goals: 8-12 weeks  Pt will maintain attention to task for 5 minutes in multimodal environment for baseline performance,  improved role performance and to improve learning and to simulate return to life environment  Pt will demo ability to participate in dual tasking/divided attention task with 75% accuracy in multimodal environment to simulate return to life roles  Pt will demo ability to alternate attention between 2 tasks with cog loading and 75% accuracy with G retention of task directions in multimodal environment to simulate return to life roles. Pt will demonstrate an increase in Memory Index Score on the MoCA to 6/15 to improve delayed recall. OTHER PLANNED THERAPY INTERVENTIONS:   Internal and external memory aides  Multimatrix for saccades/ visual clutter/attention  Hypersensitivity strategies education  Multi-modal environment  Sustained/alternating/divided attention  Tracking tube  Oculomotor control:  saccades, con/divergence  Conv./div.  Dynamic tasks  Work stations with timed transitions  Temporal Awareness  Memory and mental manipulation  Auditory processing with immediate recall  Memory retention with immediate and delayed recall  Edu on cog/vision apps

## 2023-09-28 ENCOUNTER — DOCUMENTATION (OUTPATIENT)
Dept: NEPHROLOGY | Facility: CLINIC | Age: 73
End: 2023-09-28

## 2023-09-28 NOTE — PROGRESS NOTES
Home BP readings:  AM: 156/86  PM: 146/77  Heart rate: 60 range    Recommendations:  1.   Patient should have started losartan 25 mg daily if her blood pressures were all done before starting the losartan I would recommend repeating a week of blood pressure readings in 4 weeks

## 2023-09-28 NOTE — PROGRESS NOTES
Spoke with patient about the following, she expressed understanding and thanked us for the call:    Patient should have started losartan 25 mg daily if her blood pressures were all done before starting the losartan I would recommend repeating a week of blood pressure readings in 4 weeks

## 2023-09-29 ENCOUNTER — OFFICE VISIT (OUTPATIENT)
Dept: PHYSICAL THERAPY | Facility: CLINIC | Age: 73
End: 2023-09-29
Payer: MEDICARE

## 2023-09-29 DIAGNOSIS — M25.561 CHRONIC PAIN OF RIGHT KNEE: Primary | ICD-10-CM

## 2023-09-29 DIAGNOSIS — R26.9 GAIT ABNORMALITY: ICD-10-CM

## 2023-09-29 DIAGNOSIS — G89.29 CHRONIC PAIN OF RIGHT KNEE: Primary | ICD-10-CM

## 2023-09-29 PROCEDURE — 97112 NEUROMUSCULAR REEDUCATION: CPT

## 2023-09-29 PROCEDURE — 97110 THERAPEUTIC EXERCISES: CPT

## 2023-09-29 NOTE — PROGRESS NOTES
Daily Note     Today's date: 2023  Patient name: Rajendra Blancas  : 1950  MRN: [id number]  Referring provider: Perla Norton MD  Dx:   Encounter Diagnosis     ICD-10-CM    1. Chronic pain of right knee  M25.561     G89.29       2. Gait abnormality  R26.9           Start Time: 1030  Stop Time: 1100  Total time in clinic (min): 30 minutes    Subjective: Pt reports that she has not had any pain since the IE, but she also has not "pushed it". No new falls. Objective: See treatment diary below      Assessment: Tolerated treatment well. Challenged with balance exercise but R knee remains asymptoamtic. Patient would benefit from continued PT      Plan: Continue per plan of care. Precautions: Falls, Dm2, COPD, HTN Osteoporosis, Anemia Hx, Fibromyalgia Hx, R knee Sx Arthroscopic >5 years, Hx of Skin CA. Manuals                                                                Neuro Re-Ed             Education and progression 10 Min 5 min            NBOS EC  1 min           NBOS EO Foam  1 min           Side Steps  4 laps           AP PA Walking  4 laps            Cone Step over                          Ther Ex             Semi loaded knee ext D B 4 x 10 4x10            Progression?                                        HR/TR  2x10           Hip Abd + Ext  2x10                                      Ther Activity             Knee Ext ROM B, achieved TKE            Walking B            Other Concerns             squatting             Sit<>Stands             Modalities             CP/HP PRN

## 2023-10-01 ENCOUNTER — PATIENT MESSAGE (OUTPATIENT)
Dept: NEPHROLOGY | Facility: CLINIC | Age: 73
End: 2023-10-01

## 2023-10-02 ENCOUNTER — OFFICE VISIT (OUTPATIENT)
Dept: PHYSICAL THERAPY | Facility: CLINIC | Age: 73
End: 2023-10-02
Payer: MEDICARE

## 2023-10-02 DIAGNOSIS — M25.561 CHRONIC PAIN OF RIGHT KNEE: Primary | ICD-10-CM

## 2023-10-02 DIAGNOSIS — G89.29 CHRONIC PAIN OF RIGHT KNEE: Primary | ICD-10-CM

## 2023-10-02 DIAGNOSIS — R26.9 GAIT ABNORMALITY: ICD-10-CM

## 2023-10-02 PROCEDURE — 97112 NEUROMUSCULAR REEDUCATION: CPT

## 2023-10-02 PROCEDURE — 97110 THERAPEUTIC EXERCISES: CPT

## 2023-10-02 NOTE — PROGRESS NOTES
Daily Note     Today's date: 10/2/2023  Patient name: Rajendra Blancas  : 1950  MRN: [id number]  Referring provider: Abida Zheng,*  Dx:   Encounter Diagnosis     ICD-10-CM    1. Chronic pain of right knee  M25.561     G89.29       2. Gait abnormality  R26.9           Start Time: 1155  Stop Time: 1225  Total time in clinic (min): 30 minutes    Subjective: Pt denies any changes since previous session. Objective: See treatment diary below      Assessment: Tolerated treatment well. She is given consistent reminders on proper form and reps, to ensure exercises are completed. She reports mild fatigue in B LEs with balance and walking exercises. Pt is given rest breaks as needed throughout session. Patient exhibited good technique with therapeutic exercises and would benefit from continued PT      Plan: Continue per plan of care. Precautions: Falls, Dm2, COPD, HTN Osteoporosis, Anemia Hx, Fibromyalgia Hx, R knee Sx Arthroscopic >5 years, Hx of Skin CA. Manuals 9/26 09/29 10/2                                                              Neuro Re-Ed             Education and progression 10 Min 5 min  5 min          NBOS EC  1 min 1 min          NBOS EO Foam  1 min 1 min          Side Steps  4 laps 4 laps          AP PA Walking  4 laps  4 laps          Cone Step over                          Ther Ex             Semi loaded knee ext D B 4 x 10 4x10  4x10          Progression?                                        HR/TR  2x10 2x10          Hip Abd + Ext  2x10  2x10                                    Ther Activity             Knee Ext ROM B, achieved TKE            Walking B            Other Concerns             squatting             Sit<>Stands             Modalities             CP/HP PRN

## 2023-10-03 ENCOUNTER — PATIENT MESSAGE (OUTPATIENT)
Dept: UROLOGY | Facility: CLINIC | Age: 73
End: 2023-10-03

## 2023-10-03 DIAGNOSIS — R39.9 UTI SYMPTOMS: Primary | ICD-10-CM

## 2023-10-03 NOTE — PATIENT COMMUNICATION
Spoke with patient's , Julien Sharma, letting him know hat patient should be checked out at the ED for symptoms. He expressed understanding and thanked us for the call.

## 2023-10-04 ENCOUNTER — TELEPHONE (OUTPATIENT)
Dept: NEPHROLOGY | Facility: CLINIC | Age: 73
End: 2023-10-04

## 2023-10-04 ENCOUNTER — OFFICE VISIT (OUTPATIENT)
Facility: CLINIC | Age: 73
End: 2023-10-04
Payer: MEDICARE

## 2023-10-04 ENCOUNTER — TELEPHONE (OUTPATIENT)
Dept: NEUROLOGY | Facility: CLINIC | Age: 73
End: 2023-10-04

## 2023-10-04 DIAGNOSIS — R41.3 MEMORY DIFFICULTIES: Primary | ICD-10-CM

## 2023-10-04 DIAGNOSIS — N18.4 STAGE 4 CHRONIC KIDNEY DISEASE (HCC): Primary | ICD-10-CM

## 2023-10-04 PROCEDURE — 97530 THERAPEUTIC ACTIVITIES: CPT

## 2023-10-04 NOTE — TELEPHONE ENCOUNTER
Patient's , Pio Ba, called to request the patient's lab work from yesterday be reviewed. This was suggested by endocrinology due to the albumin/creatinine ratio results. Please advise. iPo Ba requests a call back to him at [phone].

## 2023-10-04 NOTE — PROGRESS NOTES
Daily Note     Today's date: 10/4/2023  Patient name: Danika Spaulding  : 1950  MRN: [id number]  Referring provider: Ean Rossi PA-C  Dx:   Encounter Diagnosis     ICD-10-CM    1. Memory difficulties  R41.3           Start Time: 930  Stop Time: 1015  Total time in clinic (min): 45 minutes    Subjective: Pt  reports she was almost finished with her dose of antibiotics (for UTI) and was feeling better however he noted increased confusion again and requested additional urine test to assess for another UTI. PLEASE COMPLETE REMAINDER OF VISION SCREEN NEXT SESSION. Objective: See treatment diary below                                            VISION SCREEN          Pt wears prescription glasses. She was informed she needed cataract surgery prior to/during the pandemic and was told not to get new prescription glasses until she undergoes surgery however has yet to receive surgery. Comments/symptom exacerbation:           Symptoms include      Last eye exam A few years ago. Visual Acuity (near) OS: 20/200  OD: 20/100     Binocularity (near) Cover test: orthotropia  Cross cover test: esotropia,      Binocularity (far) OS: orthotropia  OD: orthotropia     Near point convergence 13 inches with 15 inches    Inocencio string  Pt unable to accurately report what she saw. Red/green fusion light N/A     Pursuits PLEASE COMPLETE NEXT SESSION. Saccades PLEASE COMPLETE NEXT SESSION. Range of motion PLEASE COMPLETE NEXT SESSION. Visual perceptual midline test PLEASE COMPLETE NEXT SESSION. Visual field testing PLEASE COMPLETE NEXT SESSION. Motor-Free Visual Perception Test (4th Edition):  Is an individually administered assessment of visual-perceptual skills commonly used in everyday activities.   Raw Score: 17  Standard Score: 74  Percentile Rank: 4%  Results:   Visual discrimination:   Figure ground:   Visual memory:   Spatial relations:   Visual closure:  Assessment: Tolerated treatment fair. Pt continues to demo increased confusion due to UTI however is actively taking antibiotics. Pt likely to receive additional urine test to determine if she has another one or it has worsened/progressed. Patient demonstrated fatigue post treatment and would benefit from continued OT. Plan: Continue per plan of care. NEW GOALS ADDED 10/4/23:   Short Term:  Pt will increase attention to 1 task for improved work performance and engagement in salient tasks 4 weeks. Pt will increase oculomotor control for improved saccades, con/divergent tasks for improved reading, board to table tasks in 4 weeks. Pt will increase verbal and written direction following with processing time of <2 min and 50% accuracy in 4 weeks. Pt will demo with improved visual perceptual skills x 50% for improved accuracy of visual discrimintation and spatial relationship tasks 4-6 weeks. Long - Term:   Pt will increase attention to 2  tasks for improved work performance and engagement in salient tasks. Pt will increase verbal and written direction following with processing time of <2 min and 75% accuracy in 4 weeks. Pt will demo with improved visual perceptual skills x 75% for improved accuracy of visual discrimintation and spatial relationship tasks 4-6 weeks.

## 2023-10-04 NOTE — TELEPHONE ENCOUNTER
Spoke to patient, I confirmed their upcoming appointment. Patient was informed of date/time and location of visit.

## 2023-10-05 ENCOUNTER — OFFICE VISIT (OUTPATIENT)
Dept: INTERNAL MEDICINE CLINIC | Facility: CLINIC | Age: 73
End: 2023-10-05
Payer: MEDICARE

## 2023-10-05 ENCOUNTER — HOSPITAL ENCOUNTER (OUTPATIENT)
Dept: RADIOLOGY | Facility: HOSPITAL | Age: 73
Discharge: HOME | End: 2023-10-05
Payer: MEDICARE

## 2023-10-05 ENCOUNTER — APPOINTMENT (OUTPATIENT)
Dept: PHYSICAL THERAPY | Facility: CLINIC | Age: 73
End: 2023-10-05
Payer: MEDICARE

## 2023-10-05 VITALS
DIASTOLIC BLOOD PRESSURE: 80 MMHG | HEART RATE: 60 BPM | BODY MASS INDEX: 30.78 KG/M2 | SYSTOLIC BLOOD PRESSURE: 128 MMHG | WEIGHT: 171 LBS | OXYGEN SATURATION: 98 % | RESPIRATION RATE: 18 BRPM | TEMPERATURE: 97.9 F

## 2023-10-05 DIAGNOSIS — M25.552 LEFT HIP PAIN: ICD-10-CM

## 2023-10-05 DIAGNOSIS — R41.0 CONFUSION: ICD-10-CM

## 2023-10-05 DIAGNOSIS — M54.2 NECK PAIN: ICD-10-CM

## 2023-10-05 DIAGNOSIS — R33.9 URINARY RETENTION: ICD-10-CM

## 2023-10-05 DIAGNOSIS — M25.552 LEFT HIP PAIN: Primary | ICD-10-CM

## 2023-10-05 PROCEDURE — 72050 X-RAY EXAM NECK SPINE 4/5VWS: CPT

## 2023-10-05 PROCEDURE — 73502 X-RAY EXAM HIP UNI 2-3 VIEWS: CPT

## 2023-10-05 PROCEDURE — 99214 OFFICE O/P EST MOD 30 MIN: CPT

## 2023-10-05 PROCEDURE — 72100 X-RAY EXAM L-S SPINE 2/3 VWS: CPT

## 2023-10-05 RX ORDER — AMOXICILLIN AND CLAVULANATE POTASSIUM 875; 125 MG/1; MG/1
TABLET, FILM COATED ORAL
COMMUNITY
Start: 2023-09-30

## 2023-10-05 NOTE — PROGRESS NOTES
Name: Alex Hinds      : 1950      MRN: [id number]  Encounter Provider: Nay Duker, MD  Encounter Date: 10/5/2023   Encounter department: [address] [place] [address]     1. Left hip pain  Assessment & Plan:  Given patient history of osteoporosis will obtain x-rays to evaluate for fracture,  Continue with lidocaine patch  hold off on Flexeril given patient's age and medical decline  Discussed with patient that we will call her once her x-rays results are in   Trial of heating pads        Orders:  -     XR hip/pelv 2-3 vws left if performed; Future; Expected date: 10/05/2023  -     XR spine lumbar 2 or 3 views injury; Future; Expected date: 10/05/2023    2. Confusion  Assessment & Plan:  · . On examination patient is AO x1(oriented to self)  · She denies any urinary urgency, frequency, dysuria. Patient  states that her confusion has worsened and he wonders if she has a UTI. · Discussed with patient and her  that we will hold off on repeating urinalysis at this time given that patient recently completed a course of antibiotic and is currently on Augmentin. · Continue current dose of Augmentin 875-125 mg twice daily  · Patient's dementia may be progressing that she should continue with follow-up appointments for neurology, geriatric, OT and PT        Orders:  -     POCT urine dip    3. Urinary retention  -     phenazopyridine (PYRIDIUM) 97 MG tablet; Take 1 tablet (97 mg total) by mouth 3 (three) times a day as needed for bladder spasms    4. Neck pain  -     XR spine cervical complete 4 or 5 vw non injury; Future; Expected date: 10/05/2023         Subjective      HPI   Ms. Kindra Cruz is a 57-year-old female who presents to the clinic complaining of left hip pain and neck pain. She states that last night while ambulating to the bathroom she started experiencing pain in her left hip. Denies any trauma to the hip or knee pain.   States that she usually ambulates with her walker or a cane however she prefers to walks without any device. The pain in her neck started a few days ago and is exacerbated by abduction and adduction. Denies any headache, blurred vision or tinnitus.  stated that about 2 weeks ago her nephrologist started her on macrobid for 5 days for UTI. At that time she was asymptomatic.  noticed an improvement in her confusion after she started this medication. He states that by day 5 she seem confused again thus he contacted our office to see if patient could have a UA ordered. Denies any fever, chills urgency, frequency or dysuria. Review of Systems   Constitutional: Negative for chills and fever. HENT: Negative for ear pain and sore throat. Eyes: Negative for pain and visual disturbance. Respiratory: Negative for cough and shortness of breath. Cardiovascular: Negative for chest pain and palpitations. Gastrointestinal: Negative for abdominal pain and vomiting. Genitourinary: Negative for dysuria and hematuria. Musculoskeletal: Positive for neck pain. Negative for arthralgias and back pain. Left hip pain   Skin: Negative for color change and rash. Neurological: Negative for seizures and syncope. Psychiatric/Behavioral: Positive for confusion. All other systems reviewed and are negative.       Current Outpatient Medications on File Prior to Visit   Medication Sig   • acetaminophen (TYLENOL) 325 mg tablet Take 3 tablets (975 mg total) by mouth every 8 (eight) hours (Patient taking differently: Take 975 mg by mouth every 8 (eight) hours As needed)   • albuterol (Ventolin HFA) 90 mcg/act inhaler Inhale 2 puffs every 6 (six) hours as needed for wheezing   • amLODIPine (NORVASC) 5 mg tablet Take 1 tablet (5 mg total) by mouth daily   • amoxicillin-clavulanate (AUGMENTIN) 875-125 mg per tablet TAKE 1 TABLET BY MOUTH EVERY 12 HOURS FOR 10 DAYS   • Calcium Citrate 1040 MG TABS Take 500 mg by mouth Three times a day   • cholecalciferol (VITAMIN D3) 1,000 units tablet Take 2 tablets (2,000 Units total) by mouth daily   • Coenzyme Q10 (CoQ10) 100 MG CAPS Take 100 mg by mouth in the morning   • DULoxetine (CYMBALTA) 30 mg delayed release capsule Take 1 capsule (30 mg total) by mouth daily   • famotidine (PEPCID) 10 mg tablet Take 1 tablet (10 mg total) by mouth daily Do not start before September 13, 2023. • ferrous sulfate 324 (65 Fe) mg Take 1 tablet (324 mg total) by mouth every other day   • gabapentin (Neurontin) 100 mg capsule Take 2 capsules (200 mg total) by mouth daily at bedtime   • Icosapent Ethyl 1 g CAPS Take 1 capsule by mouth 2 (two) times a day   • insulin aspart (NovoLOG) 100 units/mL injection Inject under the skin 3 (three) times a day before meals 18 units, 18 units, 25 units.    • insulin detemir (Levemir FlexTouch) 100 Units/mL injection pen Inject 50 Units under the skin every evening    • Lactobacillus (PROBIOTIC ACIDOPHILUS PO) Take 1 capsule by mouth   • levothyroxine (Synthroid) 125 mcg tablet Take 1 tablet (125 mcg total) by mouth daily   • losartan (COZAAR) 25 mg tablet Take 1 tablet (25 mg total) by mouth daily   • metoprolol tartrate (LOPRESSOR) 50 mg tablet Take 1 tablet (50 mg total) by mouth every 12 (twelve) hours   • pantoprazole (PROTONIX) 40 mg tablet Take 1 tablet (40 mg total) by mouth 2 (two) times a day   • pramipexole (MIRAPEX) 0.25 mg tablet Take 1 tablet (0.25 mg total) by mouth daily at bedtime   • rosuvastatin (CRESTOR) 5 mg tablet Take 1 tablet (5 mg total) by mouth daily   • torsemide (DEMADEX) 10 mg tablet Take 0.5 tablets (5 mg total) by mouth every other day Take this medication only if your weight exceeds 172 pounds   • B-D ULTRAFINE III SHORT PEN 31G X 8 MM MISC    • Cranberry 200 MG CAPS Take 200 capsules by mouth 3 (three) times a day (Patient not taking: Reported on 9/21/2023)   • estradiol (ESTRACE VAGINAL) 0.1 mg/g vaginal cream Apply pea sized amount around urethra and inside vaginal opening 3 times weekly   • Probiotic Product (PROBIOTIC PO) Take 1 capsule by mouth 2 (two) times a day (Patient not taking: Reported on 9/21/2023)       Objective     /80 (BP Location: Right arm, Patient Position: Sitting, Cuff Size: Standard)   Pulse 60   Temp 97.9 °F (36.6 °C)   Resp 18   Wt 77.6 kg (171 lb)   SpO2 98%   BMI 30.78 kg/m²     Physical Exam  HENT:      Head: Normocephalic and atraumatic. Nose: Nose normal.      Mouth/Throat:      Pharynx: Oropharynx is clear. Eyes:      Pupils: Pupils are equal, round, and reactive to light. Cardiovascular:      Rate and Rhythm: Regular rhythm. Pulses: Normal pulses. Heart sounds: Normal heart sounds. Pulmonary:      Effort: Pulmonary effort is normal.      Breath sounds: Normal breath sounds. Abdominal:      General: Bowel sounds are normal.      Palpations: Abdomen is soft. Musculoskeletal:      Lumbar back: Tenderness present. Right hip: Normal range of motion. Left hip: Tenderness present. Decreased range of motion. Skin:     General: Skin is warm and dry. Neurological:      General: No focal deficit present.       Comments: AO x1   Psychiatric:         Mood and Affect: Mood normal.         Behavior: Behavior normal.       Keshia Reyes MD

## 2023-10-05 NOTE — ASSESSMENT & PLAN NOTE
Given patient history of osteoporosis will obtain x-rays to evaluate for fracture,  Continue with lidocaine patch  hold off on Flexeril given patient's age and medical decline  Discussed with patient that we will call her once her x-rays results are in   Trial of heating pads

## 2023-10-05 NOTE — ASSESSMENT & PLAN NOTE
· .On examination patient is AO x1(oriented to self)  · She denies any urinary urgency, frequency, dysuria. Patient  states that her confusion has worsened and he wonders if she has a UTI. · Discussed with patient and her  that we will hold off on repeating urinalysis at this time given that patient recently completed a course of antibiotic and is currently on Augmentin.   · Continue current dose of Augmentin 875-125 mg twice daily  · Patient's dementia may be progressing that she should continue with follow-up appointments for neurology, geriatric, OT and PT

## 2023-10-06 ENCOUNTER — OFFICE VISIT (OUTPATIENT)
Facility: CLINIC | Age: 73
End: 2023-10-06
Payer: MEDICARE

## 2023-10-06 DIAGNOSIS — R41.3 MEMORY DIFFICULTIES: Primary | ICD-10-CM

## 2023-10-06 PROCEDURE — 97530 THERAPEUTIC ACTIVITIES: CPT

## 2023-10-06 NOTE — PROGRESS NOTES
Daily Note     Today's date: 10/6/2023  Patient name: Anali Henry  : 1950  MRN: [id number]  Referring provider: Te Spangler PA-C  Dx:   Encounter Diagnosis     ICD-10-CM    1. Memory difficulties  R41.3           Start Time: 925  Stop Time: 101  Total time in clinic (min): 50 minutes    Subjective: Pt  reports she was unable to complete HEP worksheet        Objective: See treatment diary below    Finished vision screen today:                                        VISION SCREEN          Pt wears prescription glasses. She was informed she needed cataract surgery prior to/during the pandemic and was told not to get new prescription glasses until she undergoes surgery however has yet to receive surgery. Comments/symptom exacerbation:           Symptoms include      Last eye exam A few years ago. Visual Acuity (near) OS: 20/200  OD: 20/100     Binocularity (near) Cover test: orthotropia  Cross cover test: esotropia,      Binocularity (far) OS: orthotropia  OD: orthotropia     Near point convergence 13 inches with 15 inches    Inocencio string  Pt unable to accurately report what she saw. Red/green fusion light N/A     Pursuits Shaking in all planes of motion, losing track frequently and difficulty crossing midline. Saccades Slight undershooting to the right    Range of motion WNL     Visual perceptual midline test Off center horizontally, normal vertically    Visual field testing Unable to perform        Performed sequencing worksheet, drawing lines to separate words in a category. Pt complted with about 80% accurace and min verbal cues from the therapist, reporting moderate difficulty level. Working on sustained attention, visual scanning. Taking about 15-20 minutes to complete two worksheets.      Performed 'spatial relations' worksheet, able to compelte about 75% of the activity with mod to max cues from the therapist. Working on spatial relations, sustained attention, direction following. Performed 'matching talent' patterns, placing blocks on top of a pattern laid in front of her. Pt required mod verbal cues/assist from the therapist to complete the two puzzles, with improved performance observed with the second puzzle. Working on sustained attention,  skills. Assessment: Tolerated treatment fair. Difficulty with visual tracking and crossing midline with both eyes. Patient reported moderate difficulty with sustained attention tasks provided today. Plan: Continue per plan of care. NEW GOALS ADDED 10/4/23:   Short Term:  Pt will increase attention to 1 task for improved work performance and engagement in salient tasks 4 weeks. Pt will increase oculomotor control for improved saccades, con/divergent tasks for improved reading, board to table tasks in 4 weeks. Pt will increase verbal and written direction following with processing time of <2 min and 50% accuracy in 4 weeks. Pt will demo with improved visual perceptual skills x 50% for improved accuracy of visual discrimintation and spatial relationship tasks 4-6 weeks. Long - Term:   Pt will increase attention to 2  tasks for improved work performance and engagement in salient tasks. Pt will increase verbal and written direction following with processing time of <2 min and 75% accuracy in 4 weeks. Pt will demo with improved visual perceptual skills x 75% for improved accuracy of visual discrimintation and spatial relationship tasks 4-6 weeks.

## 2023-10-09 ENCOUNTER — OFFICE VISIT (OUTPATIENT)
Dept: PHYSICAL THERAPY | Facility: CLINIC | Age: 73
End: 2023-10-09
Payer: MEDICARE

## 2023-10-09 DIAGNOSIS — R80.1 PERSISTENT PROTEINURIA: Primary | ICD-10-CM

## 2023-10-09 DIAGNOSIS — R26.9 GAIT ABNORMALITY: ICD-10-CM

## 2023-10-09 DIAGNOSIS — G89.29 CHRONIC PAIN OF RIGHT KNEE: Primary | ICD-10-CM

## 2023-10-09 DIAGNOSIS — M25.561 CHRONIC PAIN OF RIGHT KNEE: Primary | ICD-10-CM

## 2023-10-09 PROCEDURE — 97110 THERAPEUTIC EXERCISES: CPT | Performed by: PHYSICAL THERAPIST

## 2023-10-09 PROCEDURE — 97112 NEUROMUSCULAR REEDUCATION: CPT | Performed by: PHYSICAL THERAPIST

## 2023-10-09 NOTE — TELEPHONE ENCOUNTER
Called and spoke to the patient and her , Pio Ba, to inform them of ordered urine tests. They request a urinalysis be ordered for the patient to complete as well. This is because they state the pt frequently gets UTIs, and the antibiotics do not fully resolve the UTI. Please advise if this is agreeable, and if so, if it is alright for the patient to complete all urine tests and urinalysis at the same time in 1 week.

## 2023-10-09 NOTE — PROGRESS NOTES
Daily Note     Today's date: 10/9/2023  Patient name: Prabha Willis  : 1950  MRN: [id number]  Referring provider: Kelly Hernandez,*  Dx:   Encounter Diagnosis     ICD-10-CM    1. Chronic pain of right knee  M25.561     G89.29       2. Gait abnormality  R26.9                      Subjective: Pt reports that she felt the PT worked her too hard last visit. Objective: See treatment diary below      Assessment: Tolerated treatment fair. Patient would benefit from continued PT. Pt was unable to get through as much of program today secondary to fatigue. Integrate more knee extension in the seated position as her semi loaded knee extension pt has difficulty performing. Integrate more into program as able. She required frequent cues to increase step length with side steps. Plan: Continue per plan of care. Progress treatment as tolerated. Precautions: Falls, Dm2, COPD, HTN Osteoporosis, Anemia Hx, Fibromyalgia Hx, R knee Sx Arthroscopic >5 years, Hx of Skin CA. Manuals 9/26 09/29 10/2 10/9                                                             Neuro Re-Ed             Education and progression 10 Min 5 min  5 min          NBOS EC  1 min 1 min          NBOS EO Foam  1 min 1 min          Side Steps  4 laps 4 laps 4 laps         AP PA Walking  4 laps  4 laps          Cone Step over                          Ther Ex             Semi loaded knee ext D B 4 x 10 4x10  4x10 4x10         Progression?              Seated unloaded ext    2x10 (HEP)                      HR/TR  2x10 2x10 2x10 ea         Hip Abd + Ext  2x10  2x10 2x10 ea                                   Ther Activity             Knee Ext ROM B, achieved TKE            Walking B            Other Concerns             squatting             Sit<>Stands             Modalities             CP/HP PRN

## 2023-10-10 ENCOUNTER — OFFICE VISIT (OUTPATIENT)
Dept: NEUROLOGY | Facility: CLINIC | Age: 73
End: 2023-10-10
Payer: MEDICARE

## 2023-10-10 VITALS
TEMPERATURE: 97.8 F | BODY MASS INDEX: 30.6 KG/M2 | HEIGHT: 63 IN | HEART RATE: 68 BPM | OXYGEN SATURATION: 96 % | DIASTOLIC BLOOD PRESSURE: 90 MMHG | RESPIRATION RATE: 20 BRPM | SYSTOLIC BLOOD PRESSURE: 166 MMHG | WEIGHT: 172.7 LBS

## 2023-10-10 DIAGNOSIS — G91.2 NPH (NORMAL PRESSURE HYDROCEPHALUS) (HCC): Primary | ICD-10-CM

## 2023-10-10 DIAGNOSIS — R41.0 DELIRIUM: ICD-10-CM

## 2023-10-10 PROCEDURE — 99215 OFFICE O/P EST HI 40 MIN: CPT | Performed by: PSYCHIATRY & NEUROLOGY

## 2023-10-10 NOTE — PROGRESS NOTES
Patient ID: Marium Coates is a 75 y.o. female. Assessment/Plan:    Confusion  Jam Dunbar is a very pleasant 69-year-old female with a complex medical history who presents for follow-up of memory. I initially met her in 2020 for short-term memory difficulties after a prolonged intubation with poor O2 saturations. MoCA at that time 23/30. MRI was ordered and there were concerns for NPH however on further evaluation it was thought to be more consistent with atrophy due to normal aging. When I last saw her in 2021 her memory remained stable. Today they report that she has had a rapid decline since March of this year after she fell and hit her head while walking their dog. She had gone to the emergency department and imaging showed fracture of facial bones however no hemorrhage on CT head. Currently, patient has significant confusion, difficulty with comprehension requiring repetition and assistance with activities of daily living. Other symptoms involves ambulatory difficulties and urinary/bowel incontinence. MoCA today significantly changed to 7/30    At this time, concern for progressive dementia. We will do an MRI neuro quant to assess for hippocampal volume loss as well as NPH. Patient also completing therapy for a UTI which can also cause confusion in the elderly. I discussed B12 supplementation with patient as well as compliance with inspire device to treat BARBARA. I will have him follow-up after MRI for further discussion. Recommend patient follow-up with [facility] given complex medical history.        Diagnoses and all orders for this visit:    NPH (normal pressure hydrocephalus) ([address])  -     Cancel: MRI brain NeuroQuant wo contrast; Future  -     MRI brain NeuroQuant wo contrast; Future    Delirium  -     Ambulatory Referral to Neurology  -     Cancel: MRI brain NeuroQuant wo contrast; Future  -     MRI brain NeuroQuant wo contrast; Future       Subjective:    HPI   Jam Dunbar is a very pleasant 69-year-old female who presents as a follow-up for memory. I first met Vashti Meckel in 2020 at this time patient reported difficulties with short-term memory and in the setting of prolonged intubation with poor O2 saturations. MoCA at that time 23 /30. It was thought that her symptoms were a potential consequence to the active COVID pandemic and decreased social interaction. MRI was ordered and there were concerns for NPH however on further evaluation it was thought to be more consistent with atrophy due to normal aging. Her memory remains stable on follow-up in 2021. Unfortunately, MoCA was not done at that time. Interval history:  Memory history    History gathered by patient and , Charline Camera   Patients highest level of education: 2nd year of college  Patients current or past occupation: Respiratory    Living situation:Lives with , son and Lorivanna Rape     Onset: Several years, worse over the last couple of months  Major events: In the past few months has had frequent UTIs, March fell and hit her head while walking dog, since then feels like everything worsened    Progression: Gradual, rapid decline recently. Timing throughout day: Morning  Triggers: UTI     Examples:   Patient or caregiver reported: More confusion than memory,  tells her to do something and she doesn't comprehend. Takes a shower and after 20 min still standing in the shower  Forgetfulness: Yes  Word finding difficulties: Yes  Repetition: Yes,  requires repetition     Personality:  Interactions: Angry if her  bugs her about drinking water  Changes in mood or personality: No   Aggression: No   History of anxiety or depression: In the past     Dementia ROS:  Weakness: No   Tremor:No   Gait/balance issues: Rolator, on PT for balance, fall on her face   Hallucinations:No   History of stroke: No   Visual disturbances: Supposed to have had cataract surgery, delayed by covid   Urinary incontinence: Yes    ADLs:   Shower: Yes, but needs nudging to finish   Dress themselves:  has had to help her   Feed themselves: Yes   Toileting: Yes  Sleep:   Hours of sleep: 7 Hours   Vivid dreams, RLS  Follows with sleep medicine   Has BARBARA uses inspire, but noncompliant   Problems with bowel or bladder function: Bowel incontinence   Hearing problems: No   Driving: No  Finances: No   Water: poor hydration   Diet: eats well   Exercise: No  Social: Jain group every other week, senior meal at [employer]   Mentally: Has a hard time reading   Vision: Hx of herpes ophthalmicus, "its not quite normal, not quite circular    Social:  Alcohol: No   Smoking: No   Substance abuse: No     Family history   History of memory problems:  Older brother with dementia unspecified     Medication review:     Levothyroxine- Hx TSH high follows with [facility] endo       Work up:  HBA1C 7.4  B12 234        The following portions of the patient's history were reviewed and updated as appropriate: allergies, current medications, past family history, past medical history, past social history, past surgical history, and problem list.         Objective:    Blood pressure 166/90, pulse 68, temperature 97.8 °F (36.6 °C), temperature source Temporal, resp. rate 20, height 5' 2.5" (1.588 m), weight 78.3 kg (172 lb 11.2 oz), SpO2 96 %. Physical Exam  Eyes:      Extraocular Movements: Extraocular movements intact. Pupils: Pupils are equal, round, and reactive to light. Neurological:      Motor: Motor strength is normal.     Coordination: Romberg sign negative. Neurological Exam  Mental Status   Able to name objects and name parts of objects. Follows complex commands.  Fund of knowledge is abnormal.  Nic Cognitive Assessment Exam:    Visuospatial/executive function   0/5  Identification   3/3  Attention        Digits   0/2       Letters   0/1       Serial 7s   0/3  Language          Repetition   1/2       Fluency   0/1  Abstraction   1/2  Delayed recall   0/5  Orientation   2/6  Total 7/30    Requiring repetition of workup   Word finding difficulties   Good long term memory . Cranial Nerves  CN II: Visual fields full to confrontation. CN III, IV, VI: Extraocular movements intact bilaterally. Right ptosis. Pupils equal round and reactive to light bilaterally. CN VIII: Hearing is normal.  Right upper and lower facial droop noticed at rest, symmetric with activation . Motor  Normal muscle bulk throughout. Normal muscle tone. No abnormal involuntary movements. Strength is 5/5 throughout all four extremities. Coordination  Right: Rapid alternating movement normal.Left: Rapid alternating movement normal.    Gait  Casual gait: Romberg is absent. Unable to rise from chair without using arms. Reduced step height . ROS:    Review of Systems      Review of Systems   Constitutional: Negative for appetite change, fatigue and fever. HENT: Negative. Negative for hearing loss, tinnitus, trouble swallowing and voice change. Eyes: Negative. Negative for photophobia, pain and visual disturbance. Respiratory: Negative. Negative for shortness of breath. Cardiovascular: Negative. Negative for palpitations. Gastrointestinal: Negative. Negative for nausea and vomiting. Endocrine: Negative. Negative for cold intolerance. Genitourinary: Negative. Negative for dysuria, frequency and urgency. Musculoskeletal: Negative for back pain, gait problem, myalgias and neck pain. Skin: Negative. Negative for rash. Allergic/Immunologic: Negative. Neurological: Negative. Negative for dizziness, tremors, seizures, syncope, facial asymmetry, speech difficulty, weakness, light-headedness, numbness and headaches. Hematological: Negative. Does not bruise/bleed easily. Psychiatric/Behavioral: Positive for confusion (has been forgetting to do things at times ). Negative for hallucinations and sleep disturbance. All other systems reviewed and are negative.

## 2023-10-10 NOTE — PATIENT INSTRUCTIONS
Follow up with eye doctor   Drink water!    MRI   I will look into your model to see if the inspire is MRI compatible

## 2023-10-11 ENCOUNTER — OFFICE VISIT (OUTPATIENT)
Facility: CLINIC | Age: 73
End: 2023-10-11
Payer: MEDICARE

## 2023-10-11 ENCOUNTER — APPOINTMENT (OUTPATIENT)
Dept: LAB | Facility: AMBULARY SURGERY CENTER | Age: 73
End: 2023-10-11
Payer: MEDICARE

## 2023-10-11 ENCOUNTER — LAB (OUTPATIENT)
Dept: LAB | Facility: AMBULARY SURGERY CENTER | Age: 73
End: 2023-10-11
Payer: MEDICARE

## 2023-10-11 DIAGNOSIS — I12.9 PARENCHYMAL RENAL HYPERTENSION, STAGE 1 THROUGH STAGE 4 OR UNSPECIFIED CHRONIC KIDNEY DISEASE: ICD-10-CM

## 2023-10-11 DIAGNOSIS — N39.0 RECURRENT UTI: Primary | ICD-10-CM

## 2023-10-11 DIAGNOSIS — N17.9 AKI (ACUTE KIDNEY INJURY) (HCC): ICD-10-CM

## 2023-10-11 DIAGNOSIS — R41.3 MEMORY DIFFICULTIES: Primary | ICD-10-CM

## 2023-10-11 DIAGNOSIS — R80.1 PERSISTENT PROTEINURIA: ICD-10-CM

## 2023-10-11 DIAGNOSIS — E21.3 HYPERPARATHYROIDISM (HCC): ICD-10-CM

## 2023-10-11 DIAGNOSIS — N25.81 SECONDARY HYPERPARATHYROIDISM OF RENAL ORIGIN (HCC): ICD-10-CM

## 2023-10-11 DIAGNOSIS — D63.1 ANEMIA IN STAGE 4 CHRONIC KIDNEY DISEASE: ICD-10-CM

## 2023-10-11 DIAGNOSIS — N18.4 STAGE 4 CHRONIC KIDNEY DISEASE (HCC): ICD-10-CM

## 2023-10-11 DIAGNOSIS — E11.21 DIABETIC NEPHROPATHY ASSOCIATED WITH TYPE 2 DIABETES MELLITUS (HCC): ICD-10-CM

## 2023-10-11 DIAGNOSIS — R33.9 URINARY RETENTION: ICD-10-CM

## 2023-10-11 DIAGNOSIS — E55.9 VITAMIN D DEFICIENCY: ICD-10-CM

## 2023-10-11 DIAGNOSIS — N18.4 ANEMIA IN STAGE 4 CHRONIC KIDNEY DISEASE: ICD-10-CM

## 2023-10-11 DIAGNOSIS — I10 ESSENTIAL HYPERTENSION: ICD-10-CM

## 2023-10-11 LAB
ANION GAP SERPL CALCULATED.3IONS-SCNC: 6 MMOL/L
BACTERIA UR QL AUTO: ABNORMAL /HPF
BILIRUB UR QL STRIP: NEGATIVE
BUN SERPL-MCNC: 30 MG/DL (ref 5–25)
CALCIUM SERPL-MCNC: 8.4 MG/DL (ref 8.4–10.2)
CHLORIDE SERPL-SCNC: 102 MMOL/L (ref 96–108)
CLARITY UR: CLEAR
CO2 SERPL-SCNC: 29 MMOL/L (ref 21–32)
COLOR UR: ABNORMAL
CREAT SERPL-MCNC: 2.16 MG/DL (ref 0.6–1.3)
ERYTHROCYTE [DISTWIDTH] IN BLOOD BY AUTOMATED COUNT: 15.8 % (ref 11.6–15.1)
GFR SERPL CREATININE-BSD FRML MDRD: 21 ML/MIN/1.73SQ M
GLUCOSE P FAST SERPL-MCNC: 229 MG/DL (ref 65–99)
GLUCOSE UR STRIP-MCNC: ABNORMAL MG/DL
HCT VFR BLD AUTO: 38.4 % (ref 34.8–46.1)
HGB BLD-MCNC: 11.9 G/DL (ref 11.5–15.4)
HGB UR QL STRIP.AUTO: NEGATIVE
HYALINE CASTS #/AREA URNS LPF: ABNORMAL /LPF
IRON SATN MFR SERPL: 24 % (ref 15–50)
IRON SERPL-MCNC: 79 UG/DL (ref 50–212)
KETONES UR STRIP-MCNC: NEGATIVE MG/DL
LEUKOCYTE ESTERASE UR QL STRIP: NEGATIVE
MCH RBC QN AUTO: 27.5 PG (ref 26.8–34.3)
MCHC RBC AUTO-ENTMCNC: 31 G/DL (ref 31.4–37.4)
MCV RBC AUTO: 89 FL (ref 82–98)
NITRITE UR QL STRIP: POSITIVE
NON-SQ EPI CELLS URNS QL MICRO: ABNORMAL /HPF
PH UR STRIP.AUTO: 6.5 [PH]
PLATELET # BLD AUTO: 276 THOUSANDS/UL (ref 149–390)
PMV BLD AUTO: 10.9 FL (ref 8.9–12.7)
POTASSIUM SERPL-SCNC: 4 MMOL/L (ref 3.5–5.3)
PROT UR STRIP-MCNC: ABNORMAL MG/DL
RBC # BLD AUTO: 4.32 MILLION/UL (ref 3.81–5.12)
RBC #/AREA URNS AUTO: ABNORMAL /HPF
SODIUM SERPL-SCNC: 137 MMOL/L (ref 135–147)
SP GR UR STRIP.AUTO: 1.02 (ref 1–1.03)
TIBC SERPL-MCNC: 332 UG/DL (ref 250–450)
UIBC SERPL-MCNC: 253 UG/DL (ref 155–355)
UROBILINOGEN UR STRIP-ACNC: <2 MG/DL
WBC # BLD AUTO: 8.41 THOUSAND/UL (ref 4.31–10.16)
WBC #/AREA URNS AUTO: ABNORMAL /HPF

## 2023-10-11 PROCEDURE — 83550 IRON BINDING TEST: CPT

## 2023-10-11 PROCEDURE — 83540 ASSAY OF IRON: CPT

## 2023-10-11 PROCEDURE — 97530 THERAPEUTIC ACTIVITIES: CPT

## 2023-10-11 PROCEDURE — 85027 COMPLETE CBC AUTOMATED: CPT

## 2023-10-11 PROCEDURE — 80048 BASIC METABOLIC PNL TOTAL CA: CPT

## 2023-10-11 PROCEDURE — 36415 COLL VENOUS BLD VENIPUNCTURE: CPT

## 2023-10-11 PROCEDURE — 81001 URINALYSIS AUTO W/SCOPE: CPT

## 2023-10-11 RX ORDER — NITROFURANTOIN MACROCRYSTALS 50 MG/1
50 CAPSULE ORAL
Qty: 90 CAPSULE | Refills: 3 | Status: SHIPPED | OUTPATIENT
Start: 2023-10-11 | End: 2023-10-31

## 2023-10-11 NOTE — TELEPHONE ENCOUNTER
Patient's , Charles Bell, called regarding urine tests. I had previously explained to the patient that in order to check that there is no more bacteria in her urine, she would have to wait a week after her last antibiotic dose. Patient did not share this with Charles Bell and mistakenly completed it today. They are requesting another UA be ordered for completion in 1 week.

## 2023-10-11 NOTE — PROGRESS NOTES
Daily Note     Today's date: 10/11/2023  Patient name: Valentin Vo  : 1950  MRN: [id number]  Referring provider: Addi Reich PA-C  Dx:   Encounter Diagnosis     ICD-10-CM    1. Memory difficulties  R41.3             Start Time: 1330  Stop Time: 1415  Total time in clinic (min): 45 minutes    Subjective: Pt , Estelita Liz, reports she has finished a course of antibiotics for her most recent UTI and her WBC count was normal from blood work. Objective: See treatment diary below      TA:   -Pt completed  Looking Chart. Pt demo max difficulty attending to task to find 3 items on card. Pt given max cues to follow prompts and given visual and verbal cues to assist with finding item. Task completed to improve  skills and sustained attention.   -Pt completed MultiMatrix alphabet sorting. Pt demo max difficulty with this task and required max visual and verbal cues to complete task. Task completed to improve sustained attention and critical thinking skills.   -Pt completed 3 simple Beads and pattern cards. Pt demo difficulty following visual and verbal cues. Pt demo difficulty with cue to pull bead onto string. Task downgraded for pt to match bead to card and pt demo increased success. Pt demo improved performance with simple, one step directions. Task completed to improve  skills and sustained attention to task. Assessment: Tolerated treatment fair. Difficulty with following cues and directions. Pt demo limited attention to task. Patient reported moderate difficulty with sustained attention tasks provided today. Plan: Continue per plan of care. NEW GOALS ADDED 10/4/23:   Short Term:  Pt will increase attention to 1 task for improved work performance and engagement in salient tasks 4 weeks. Pt will increase oculomotor control for improved saccades, con/divergent tasks for improved reading, board to table tasks in 4 weeks.    Pt will increase verbal and written direction following with processing time of <2 min and 50% accuracy in 4 weeks. Pt will demo with improved visual perceptual skills x 50% for improved accuracy of visual discrimintation and spatial relationship tasks 4-6 weeks. Long - Term:   Pt will increase attention to 2  tasks for improved work performance and engagement in salient tasks. Pt will increase verbal and written direction following with processing time of <2 min and 75% accuracy in 4 weeks. Pt will demo with improved visual perceptual skills x 75% for improved accuracy of visual discrimintation and spatial relationship tasks 4-6 weeks.

## 2023-10-11 NOTE — RESULT ENCOUNTER NOTE
CBC and BMP reviewed and stable. Renal function stable. Please call patient let her know her labs are stable.

## 2023-10-11 NOTE — ASSESSMENT & PLAN NOTE
Warren Padilla is a very pleasant 39-year-old female with a complex medical history who presents for follow-up of memory. I initially met her in 2020 for short-term memory difficulties after a prolonged intubation with poor O2 saturations. MoCA at that time 23/30. MRI was ordered and there were concerns for NPH however on further evaluation it was thought to be more consistent with atrophy due to normal aging. When I last saw her in 2021 her memory remained stable. Today they report that she has had a rapid decline since March of this year after she fell and hit her head while walking their dog. She had gone to the emergency department and imaging showed fracture of facial bones however no hemorrhage on CT head. Currently, patient has significant confusion, difficulty with comprehension requiring repetition and assistance with activities of daily living. Other symptoms involves ambulatory difficulties and urinary/bowel incontinence. MoCA today significantly changed to 7/30    At this time, concern for progressive dementia. We will do an MRI neuro quant to assess for hippocampal volume loss as well as NPH. Patient also completing therapy for a UTI which can also cause confusion in the elderly. I discussed B12 supplementation with patient as well as compliance with inspire device to treat BARBARA. I will have him follow-up after MRI for further discussion. Recommend patient follow-up with [facility] given complex medical history.

## 2023-10-12 ENCOUNTER — OFFICE VISIT (OUTPATIENT)
Dept: PHYSICAL THERAPY | Facility: CLINIC | Age: 73
End: 2023-10-12
Payer: MEDICARE

## 2023-10-12 ENCOUNTER — TELEPHONE (OUTPATIENT)
Dept: NEPHROLOGY | Facility: CLINIC | Age: 73
End: 2023-10-12

## 2023-10-12 DIAGNOSIS — R26.9 GAIT ABNORMALITY: ICD-10-CM

## 2023-10-12 DIAGNOSIS — N39.0 RECURRENT UTI: ICD-10-CM

## 2023-10-12 DIAGNOSIS — M25.561 CHRONIC PAIN OF RIGHT KNEE: Primary | ICD-10-CM

## 2023-10-12 DIAGNOSIS — G89.29 CHRONIC PAIN OF RIGHT KNEE: Primary | ICD-10-CM

## 2023-10-12 PROCEDURE — 97112 NEUROMUSCULAR REEDUCATION: CPT | Performed by: PHYSICAL THERAPIST

## 2023-10-12 PROCEDURE — 97110 THERAPEUTIC EXERCISES: CPT | Performed by: PHYSICAL THERAPIST

## 2023-10-12 RX ORDER — CEPHALEXIN 250 MG/1
250 CAPSULE ORAL
Qty: 30 CAPSULE | Refills: 0 | Status: SHIPPED | OUTPATIENT
Start: 2023-10-12 | End: 2023-11-09

## 2023-10-12 NOTE — RESULT ENCOUNTER NOTE
UA reviewed and stable, improved. No WBCs or significant bacteria. Please call patient with results.

## 2023-10-12 NOTE — PROGRESS NOTES
Daily Note     Today's date: 10/12/2023  Patient name: Hansel Baum  : 1950  MRN: [id number]  Referring provider: Negar Hawkins,*  Dx:   Encounter Diagnosis     ICD-10-CM    1. Chronic pain of right knee  M25.561     G89.29       2. Gait abnormality  R26.9                      Subjective: Pt indicates that her L knee seems to be getting better. Reports that she is not as fatigued today as last visit. Objective: See treatment diary below      Assessment: No rest required t/o entire session. Able to tolerance outlined activities, consider further progression as able. Plan: Continue per plan of care. Progress treatment as tolerated. Precautions: Falls, Dm2, COPD, HTN Osteoporosis, Anemia Hx, Fibromyalgia Hx, R knee Sx Arthroscopic >5 years, Hx of Skin CA. Manuals 9/26 09/29 10/2 10/9 10/12                                                            Neuro Re-Ed             Education and progression 10 Min 5 min  5 min          NBOS EC  1 min 1 min  1 min        NBOS EO Foam  1 min 1 min  1 min        Side Steps  4 laps 4 laps 4 laps 4 laps        AP PA Walking  4 laps  4 laps  4 laps        Cone Step over                          Ther Ex             Semi loaded knee ext D B 4 x 10 4x10  4x10 4x10 4 x 10        Progression? Seated unloaded ext    2x10 (HEP) 2 x 10                     HR/TR  2x10 2x10 2x10 ea 2 x 10         Hip Abd + Ext  2x10  2x10 2x10 ea 2 x 10                                  Ther Activity             Knee Ext ROM B, achieved TKE            Walking B            Other Concerns             squatting     Nv? Sit<>Stands     Nv?  With foam.         Modalities             CP/HP PRN

## 2023-10-12 NOTE — TELEPHONE ENCOUNTER
----- Message from Jessica Lopez PA-C sent at 10/11/2023  4:18 PM EDT -----  CBC and BMP reviewed and stable. Renal function stable. Please call patient let her know her labs are stable.

## 2023-10-13 ENCOUNTER — OFFICE VISIT (OUTPATIENT)
Facility: CLINIC | Age: 73
End: 2023-10-13
Payer: MEDICARE

## 2023-10-13 DIAGNOSIS — R41.3 MEMORY DIFFICULTIES: Primary | ICD-10-CM

## 2023-10-13 PROCEDURE — 97530 THERAPEUTIC ACTIVITIES: CPT

## 2023-10-13 NOTE — PROGRESS NOTES
Daily Note     Today's date: 10/13/2023  Patient name: Terence High  : 1950  MRN: [id number]  Referring provider: Alvino Solano PA-C  Dx:   Encounter Diagnosis     ICD-10-CM    1. Memory difficulties  R41.3               Start Time: 5513  Stop Time: 1100  Total time in clinic (min): 45 minutes    Subjective: Pt , Hailee More, reports Gabriella's urologist put her on a course of antibiotics for the next 4 weeks to prevent reoccurrence of UTIs. Objective: See treatment diary below      TA:   -Pt completed 5 column set of beads task in which pt was required to utilize shape blocks to copy prompt. Pt demo difficulty following directions and required max visual and verbal cues to follow prompt correctly. -Pt completed 4 trials of Spot It. Pt demo difficulty with understanding however was able to identify 2 similarities. Task terminated due to difficulty. Task completed to improve  skills and sustained attention.  -Pt attempted 3 Pattern Blocks prompts however required max visual and verbal cues to copy prompts. Task terminated at third trial after 2 more successful trials with max russ. Task completed to improve sustained attention and  skills.    -Pt attempted peg board task in which pt was required to follow visual prompt. Pt unable to complete task therefore it was terminated. -Pt  given HEP for pt; explained to  with understanding. Assessment: Tolerated treatment poorly. Pt demo difficulty with following cues and directions. Pt demo limited attention to task. Plan: Continue per plan of care. NEW GOALS ADDED 10/4/23:   Short Term:  Pt will increase attention to 1 task for improved work performance and engagement in salient tasks 4 weeks. Pt will increase oculomotor control for improved saccades, con/divergent tasks for improved reading, board to table tasks in 4 weeks.    Pt will increase verbal and written direction following with processing time of <2 min and 50% accuracy in 4 weeks. Pt will demo with improved visual perceptual skills x 50% for improved accuracy of visual discrimintation and spatial relationship tasks 4-6 weeks. Long - Term:   Pt will increase attention to 2  tasks for improved work performance and engagement in salient tasks. Pt will increase verbal and written direction following with processing time of <2 min and 75% accuracy in 4 weeks. Pt will demo with improved visual perceptual skills x 75% for improved accuracy of visual discrimintation and spatial relationship tasks 4-6 weeks.

## 2023-10-16 ENCOUNTER — OFFICE VISIT (OUTPATIENT)
Facility: CLINIC | Age: 73
End: 2023-10-16
Payer: MEDICARE

## 2023-10-16 DIAGNOSIS — N18.4 STAGE 4 CHRONIC KIDNEY DISEASE (HCC): ICD-10-CM

## 2023-10-16 DIAGNOSIS — I10 ESSENTIAL HYPERTENSION: ICD-10-CM

## 2023-10-16 DIAGNOSIS — I12.9 PARENCHYMAL RENAL HYPERTENSION, STAGE 1 THROUGH STAGE 4 OR UNSPECIFIED CHRONIC KIDNEY DISEASE: ICD-10-CM

## 2023-10-16 DIAGNOSIS — R80.1 PERSISTENT PROTEINURIA: ICD-10-CM

## 2023-10-16 DIAGNOSIS — R41.3 MEMORY DIFFICULTIES: Primary | ICD-10-CM

## 2023-10-16 PROCEDURE — 97112 NEUROMUSCULAR REEDUCATION: CPT

## 2023-10-16 RX ORDER — LOSARTAN POTASSIUM 25 MG/1
25 TABLET ORAL DAILY
Qty: 90 TABLET | Refills: 2 | Status: SHIPPED | OUTPATIENT
Start: 2023-10-16

## 2023-10-16 NOTE — PROGRESS NOTES
Daily Note     Today's date: 10/16/2023  Patient name: Altagracia Lewis  : 1950  MRN: [id number]  Referring provider: Carlos Luke PA-C  Dx:   Encounter Diagnosis     ICD-10-CM    1. Memory difficulties  R41.3                 Start Time: 1299  Stop Time: 1500  Total time in clinic (min): 40 minutes    Subjective: Pt , [facility], reports [place] Argueta just started new course of antibiotics yesterday. Objective: See treatment diary below      NMR:   -Pt completed BCAT Visual Cancellation Basic Exercise 1.1 in which pt was required to identify the dog in each row of visually presented objects. Pt asked to cross out the dogs and leave the horses blank. Pt demo max difficulty understanding task and given max visual and verbal cues. Pt completed 10% of task correctly. Task completed to improve sustained attention.   -Pt completed BCAT Word Sort task Basic Exercise 1.1 in which pt was to categorize list of 12 words into 4 categories (fruit, furniture, sports, and flowers). Pt required max visual and verbal cues to categorize words. Pt unable to recall that football and baseball were sports or that table and couch were pieces of furniture. Task completed to improve EF skills and recall. Purpose of task explained to pt and  with good understanding.   -Pt completed Sequencing, Sequencing Steps in a Task. Pt required mod cues to complete task however demo increased success with this task compared to previous tasks. Task completed to improve EF skills and problem solving skills.   -Pt completed Story Recall Exercise Basic 1.1 in which pt was read a short story then asked 4 questions. Pt answered 2/4 questions answered correctly. Task completed to improve recall memory. Assessment: Tolerated treatment poorly. Pt demo difficulty with following cues and directions. Pt demo limited attention to task. Plan: Continue per plan of care.            NEW GOALS ADDED 10/4/23:   Short Term:  Pt will increase attention to 1 task for improved work performance and engagement in salient tasks 4 weeks. Pt will increase oculomotor control for improved saccades, con/divergent tasks for improved reading, board to table tasks in 4 weeks. Pt will increase verbal and written direction following with processing time of <2 min and 50% accuracy in 4 weeks. Pt will demo with improved visual perceptual skills x 50% for improved accuracy of visual discrimintation and spatial relationship tasks 4-6 weeks. Long - Term:   Pt will increase attention to 2  tasks for improved work performance and engagement in salient tasks. Pt will increase verbal and written direction following with processing time of <2 min and 75% accuracy in 4 weeks. Pt will demo with improved visual perceptual skills x 75% for improved accuracy of visual discrimintation and spatial relationship tasks 4-6 weeks.

## 2023-10-17 NOTE — ASSESSMENT & PLAN NOTE
Scheduled for:  Colonoscopy 57590/08699  Provider Name:  Dr. Ted Nicolas   Date:  12/28/2023  Location:[facility]  Sedation:  MAC  Time:  2:15pm (pt is aware that [facility] will call the day before to confirm arrival time)    Prep:  Trilyte Prep Instructions Given At The Office Visit. Meds/Allergies Reconciled?:  Physician Reviewed   Diagnosis with codes:  Colon screening Z12.11  Was patient informed to call insurance with codes (Y/N):  Yes  Referral sent?:  Referral was sent at the time of electronic surgical scheduling. [facility] or [id number] notified?:  I sent an electronic request to Endo Scheduling and received a confirmation today. Medication Orders:  Pt is aware to NOT take iron pills, herbal meds and diet supplements for 7 days before exam. Also to NOT take any form of alcohol, recreational drugs and any forms of ED meds 24 hours before exam.   Misc Orders:       Further instructions given by staff:  I provide prep instructions to patient at the time of the appointment and reviewed date, time and location, he verbalized that he understood and is aware to call if he has any questions. Patient was informed about the new cancellation policy for his/her procedure. Patient was also given a copy of the cancellation policy at the time of the appointment and verbalized understanding. most recent hemogloin 11 9, which is about at her baseline

## 2023-10-18 ENCOUNTER — APPOINTMENT (OUTPATIENT)
Dept: LAB | Facility: AMBULARY SURGERY CENTER | Age: 73
End: 2023-10-18
Payer: MEDICARE

## 2023-10-18 ENCOUNTER — APPOINTMENT (OUTPATIENT)
Dept: PHYSICAL THERAPY | Facility: CLINIC | Age: 73
End: 2023-10-18
Payer: MEDICARE

## 2023-10-18 DIAGNOSIS — N18.4 STAGE 4 CHRONIC KIDNEY DISEASE (HCC): ICD-10-CM

## 2023-10-18 DIAGNOSIS — N18.4 STAGE 4 CHRONIC KIDNEY DISEASE (HCC): Primary | ICD-10-CM

## 2023-10-18 DIAGNOSIS — R80.9 NEPHROTIC RANGE PROTEINURIA: ICD-10-CM

## 2023-10-18 LAB
CREAT UR-MCNC: 69.6 MG/DL
MICROALBUMIN UR-MCNC: 4142 MG/L
MICROALBUMIN UR-MCNC: 4142 MG/L
MICROALBUMIN/CREAT 24H UR: 5951 MG/G CREATININE (ref 0–30)
MICROALBUMIN/CREAT 24H UR: 5951 MG/G CREATININE (ref 0–30)
PROT UR-MCNC: 579 MG/DL
PROT UR-MCNC: 579 MG/DL
PROT/CREAT UR: 8.32 MG/G{CREAT} (ref 0–0.1)
PROT/CREAT UR: 8.32 MG/G{CREAT} (ref 0–0.1)

## 2023-10-18 PROCEDURE — 82570 ASSAY OF URINE CREATININE: CPT

## 2023-10-18 PROCEDURE — 82043 UR ALBUMIN QUANTITATIVE: CPT

## 2023-10-18 PROCEDURE — 84156 ASSAY OF PROTEIN URINE: CPT

## 2023-10-19 ENCOUNTER — TELEPHONE (OUTPATIENT)
Dept: NEPHROLOGY | Facility: CLINIC | Age: 73
End: 2023-10-19

## 2023-10-19 ENCOUNTER — APPOINTMENT (OUTPATIENT)
Dept: LAB | Facility: AMBULARY SURGERY CENTER | Age: 73
End: 2023-10-19
Payer: MEDICARE

## 2023-10-19 DIAGNOSIS — N18.4 STAGE 4 CHRONIC KIDNEY DISEASE (HCC): ICD-10-CM

## 2023-10-19 LAB
CREAT 24H UR-MRATE: 0.7 G/24HR (ref 0.6–1.8)
CREAT 24H UR-MRATE: 0.7 G/24HR (ref 0.6–1.8)
PROT 24H UR-MCNC: 6198.5 MG/24 HRS
PROT 24H UR-MCNC: 6198.5 MG/24 HRS
SPECIMEN VOL UR: 1150 ML

## 2023-10-19 PROCEDURE — 84156 ASSAY OF PROTEIN URINE: CPT

## 2023-10-19 PROCEDURE — 82570 ASSAY OF URINE CREATININE: CPT

## 2023-10-19 NOTE — TELEPHONE ENCOUNTER
Pt  called office asking to place his cell # as the primary phone when calling pt with any information since pt herself gets very confused at times.

## 2023-10-20 ENCOUNTER — DOCUMENTATION (OUTPATIENT)
Dept: NEPHROLOGY | Facility: CLINIC | Age: 73
End: 2023-10-20

## 2023-10-20 ENCOUNTER — OFFICE VISIT (OUTPATIENT)
Dept: PHYSICAL THERAPY | Facility: CLINIC | Age: 73
End: 2023-10-20
Payer: MEDICARE

## 2023-10-20 ENCOUNTER — TELEPHONE (OUTPATIENT)
Dept: NEPHROLOGY | Facility: CLINIC | Age: 73
End: 2023-10-20

## 2023-10-20 DIAGNOSIS — N39.0 RECURRENT UTI: Primary | ICD-10-CM

## 2023-10-20 DIAGNOSIS — G89.29 CHRONIC PAIN OF RIGHT KNEE: Primary | ICD-10-CM

## 2023-10-20 DIAGNOSIS — R26.9 GAIT ABNORMALITY: ICD-10-CM

## 2023-10-20 DIAGNOSIS — N18.4 STAGE 4 CHRONIC KIDNEY DISEASE (HCC): Primary | ICD-10-CM

## 2023-10-20 DIAGNOSIS — M25.561 CHRONIC PAIN OF RIGHT KNEE: Primary | ICD-10-CM

## 2023-10-20 PROCEDURE — 97112 NEUROMUSCULAR REEDUCATION: CPT

## 2023-10-20 PROCEDURE — 97110 THERAPEUTIC EXERCISES: CPT

## 2023-10-20 NOTE — TELEPHONE ENCOUNTER
Spoke with patient's , Trae moseley, about the following, he expressed understanding and thanked us for the call:    ----- Message from Vini Kan MD sent at 10/20/2023 10:01 AM EDT -----  I would increase losartan to 25 mg twice a day  Recheck a basic metabolic profile 1 week later  Recheck blood pressures in 2 weeks  ----- Message -----  From: Regulo Richards  Sent: 10/20/2023   9:37 AM EDT  To: Vini Kan MD    AM:  171/99  PM:  153/85  Pulse:  61  ----- Message -----  From: Vini Kan MD  Sent: 10/20/2023   7:26 AM EDT  To: [facility] [place] [facility]    I believe I sent you message already about checking blood pressures at this time may have to adjust her blood pressure medications given protein in the urine

## 2023-10-20 NOTE — PROGRESS NOTES
Daily Note     Today's date: 10/20/2023  Patient name: Anali Henry  : 1950  MRN: [id number]  Referring provider: Lisa Beasley,*  Dx:   Encounter Diagnosis     ICD-10-CM    1. Chronic pain of right knee  M25.561     G89.29       2. Gait abnormality  R26.9                      Subjective: Patient states, "I exercised yesterday, I went apple picking."       Objective: See treatment diary below      Assessment: Tolerated treatment well. Frequent cues for sequencing, no rest breaks required. Patient demonstrated fatigue post treatment and would benefit from continued PT      Plan: Progress treatment as tolerated. Precautions: Falls, Dm2, COPD, HTN Osteoporosis, Anemia Hx, Fibromyalgia Hx, R knee Sx Arthroscopic >5 years, Hx of Skin CA. Manuals 9/26 09/29 10/2 10/9 10/12 10/20                                                           Neuro Re-Ed             Education and progression 10 Min 5 min  5 min          NBOS EC  1 min 1 min  1 min 1 min       NBOS EO Foam  1 min 1 min  1 min 1 min        Side Steps  4 laps 4 laps 4 laps 4 laps 4 laps       AP PA Walking  4 laps  4 laps  4 laps 4 laps       Cone Step over                          Ther Ex             Semi loaded knee ext D B 4 x 10 4x10  4x10 4x10 4 x 10 4x10       Progression? Seated unloaded ext    2x10 (HEP) 2 x 10 2x10                    HR/TR  2x10 2x10 2x10 ea 2 x 10  2x10       Hip Abd + Ext  2x10  2x10 2x10 ea 2 x 10 2x10                                 Ther Activity             Knee Ext ROM B, achieved TKE            Walking B            Other Concerns             squatting     Nv? Sit<>Stands     Nv?  With foam.  2x10 w/ foam       Modalities             CP/HP PRN

## 2023-10-21 ENCOUNTER — APPOINTMENT (OUTPATIENT)
Dept: LAB | Facility: CLINIC | Age: 73
End: 2023-10-21

## 2023-10-21 DIAGNOSIS — N39.0 RECURRENT UTI: ICD-10-CM

## 2023-10-23 ENCOUNTER — OFFICE VISIT (OUTPATIENT)
Facility: CLINIC | Age: 73
End: 2023-10-23
Payer: MEDICARE

## 2023-10-23 ENCOUNTER — APPOINTMENT (OUTPATIENT)
Dept: LAB | Facility: AMBULARY SURGERY CENTER | Age: 73
End: 2023-10-23
Payer: MEDICARE

## 2023-10-23 DIAGNOSIS — N39.0 URINARY TRACT INFECTION WITHOUT HEMATURIA, SITE UNSPECIFIED: ICD-10-CM

## 2023-10-23 DIAGNOSIS — R41.3 MEMORY DIFFICULTIES: Primary | ICD-10-CM

## 2023-10-23 LAB
BACTERIA UR QL AUTO: ABNORMAL /HPF
BILIRUB UR QL STRIP: NEGATIVE
CLARITY UR: CLEAR
COLOR UR: ABNORMAL
GLUCOSE UR STRIP-MCNC: ABNORMAL MG/DL
HGB UR QL STRIP.AUTO: ABNORMAL
KETONES UR STRIP-MCNC: NEGATIVE MG/DL
LEUKOCYTE ESTERASE UR QL STRIP: NEGATIVE
MUCOUS THREADS UR QL AUTO: ABNORMAL
NITRITE UR QL STRIP: NEGATIVE
NON-SQ EPI CELLS URNS QL MICRO: ABNORMAL /HPF
PH UR STRIP.AUTO: 6.5 [PH]
PROT UR STRIP-MCNC: ABNORMAL MG/DL
RBC #/AREA URNS AUTO: ABNORMAL /HPF
SP GR UR STRIP.AUTO: 1.02 (ref 1–1.03)
UROBILINOGEN UR STRIP-ACNC: <2 MG/DL
WBC #/AREA URNS AUTO: ABNORMAL /HPF

## 2023-10-23 PROCEDURE — 81001 URINALYSIS AUTO W/SCOPE: CPT | Performed by: PHYSICIAN ASSISTANT

## 2023-10-23 PROCEDURE — 87086 URINE CULTURE/COLONY COUNT: CPT

## 2023-10-23 PROCEDURE — 97530 THERAPEUTIC ACTIVITIES: CPT

## 2023-10-23 NOTE — PROGRESS NOTES
Daily Note     Today's date: 10/23/2023  Patient name: Kavya Chacko  : 1950  MRN: [id number]  Referring provider: Saritha Ventura PA-C  Dx:   Encounter Diagnosis     ICD-10-CM    1. Memory difficulties  R41.3                   Start Time: 1418  Stop Time: 1458  Total time in clinic (min): 40 minutes    Subjective: Pt , Jojo Brooke, reports it seems as though she has not been improving with her new course of antibiotics. Pt to undergo repeat urine test as  suspects she has had one, continuous UTI for about a month. Pt reports difficulty with her balance. Objective: See treatment diary below      TA:   -Pt completed Magnetic Ring Board Game with max verbal cues. Pt demo difficulty attending to task and recalling directions. Task completed to improve  skills and direction following.   -Pt completed card sorting task by face x10 with max visual and verbal cues to recall directions to task. Task terminated due to level of difficulty. Task completed to improve divided attention.   -Pt completed card sorting task by color with mod visual and verbal cues to recall directions and to attend to task. Pt demo increased success with task. Task completed to improve divided attention. Assessment: Tolerated treatment fair. Pt demo difficulty with following cues and directions. Pt demo limited attention to task. Pt  reports Gianni Rodriguez has eye exam in November. Pt would benefit from continued OT. Plan: Continue per plan of care. NEW GOALS ADDED 10/4/23:   Short Term:  Pt will increase attention to 1 task for improved work performance and engagement in salient tasks 4 weeks. Pt will increase oculomotor control for improved saccades, con/divergent tasks for improved reading, board to table tasks in 4 weeks. Pt will increase verbal and written direction following with processing time of <2 min and 50% accuracy in 4 weeks.   Pt will demo with improved visual perceptual skills x 50% for improved accuracy of visual discrimintation and spatial relationship tasks 4-6 weeks. Long - Term:   Pt will increase attention to 2  tasks for improved work performance and engagement in salient tasks. Pt will increase verbal and written direction following with processing time of <2 min and 75% accuracy in 4 weeks. Pt will demo with improved visual perceptual skills x 75% for improved accuracy of visual discrimintation and spatial relationship tasks 4-6 weeks.

## 2023-10-24 ENCOUNTER — EVALUATION (OUTPATIENT)
Dept: PHYSICAL THERAPY | Facility: CLINIC | Age: 73
End: 2023-10-24
Payer: MEDICARE

## 2023-10-24 DIAGNOSIS — N18.4 STAGE 4 CHRONIC KIDNEY DISEASE (HCC): ICD-10-CM

## 2023-10-24 DIAGNOSIS — R26.9 GAIT ABNORMALITY: ICD-10-CM

## 2023-10-24 DIAGNOSIS — I10 ESSENTIAL HYPERTENSION: ICD-10-CM

## 2023-10-24 DIAGNOSIS — G89.29 CHRONIC PAIN OF RIGHT KNEE: Primary | ICD-10-CM

## 2023-10-24 DIAGNOSIS — I12.9 PARENCHYMAL RENAL HYPERTENSION, STAGE 1 THROUGH STAGE 4 OR UNSPECIFIED CHRONIC KIDNEY DISEASE: ICD-10-CM

## 2023-10-24 DIAGNOSIS — M25.561 CHRONIC PAIN OF RIGHT KNEE: Primary | ICD-10-CM

## 2023-10-24 DIAGNOSIS — R80.1 PERSISTENT PROTEINURIA: ICD-10-CM

## 2023-10-24 LAB — BACTERIA UR CULT: NORMAL

## 2023-10-24 PROCEDURE — 97110 THERAPEUTIC EXERCISES: CPT

## 2023-10-24 PROCEDURE — 97112 NEUROMUSCULAR REEDUCATION: CPT

## 2023-10-24 RX ORDER — LOSARTAN POTASSIUM 25 MG/1
25 TABLET ORAL 2 TIMES DAILY
Qty: 180 TABLET | Refills: 3 | Status: SHIPPED | OUTPATIENT
Start: 2023-10-24

## 2023-10-24 NOTE — PROGRESS NOTES
Daily Note     Today's date: 10/24/2023  Patient name: Himanshu Kline  : 1950  MRN: [id number]  Referring provider: Zonia Nageotte,*  Dx:   Encounter Diagnosis     ICD-10-CM    1. Chronic pain of right knee  M25.561     G89.29       2. Gait abnormality  R26.9                      Subjective: Pt reports that her balance has improved but her knee pain remains. Objective: See treatment diary below      Assessment: see PT RE. Tolerated treatment well. Patient would benefit from continued PT      Plan: Continue per plan of care. Precautions: Falls, Dm2, COPD, HTN Osteoporosis, Anemia Hx, Fibromyalgia Hx, R knee Sx Arthroscopic >5 years, Hx of Skin CA. Manuals 9/26 09/29 10/2 10/9 10/12 10/20 10/24                                                          Neuro Re-Ed             Education and progression 10 Min 5 min  5 min          NBOS EC  1 min 1 min  1 min 1 min 1 min      NBOS EO Foam  1 min 1 min  1 min 1 min  1 min      Side Steps  4 laps 4 laps 4 laps 4 laps 4 laps 4 laps      AP PA Walking  4 laps  4 laps  4 laps 4 laps 4 laps      Cone Step over                          Ther Ex             Semi loaded knee ext D B 4 x 10 4x10  4x10 4x10 4 x 10 4x10 4x10       Progression? Seated unloaded ext    2x10 (HEP) 2 x 10 2x10 2x10                   HR/TR  2x10 2x10 2x10 ea 2 x 10  2x10 2x10      Hip Abd + Ext  2x10  2x10 2x10 ea 2 x 10 2x10 2x10                                 Ther Activity             Knee Ext ROM B, achieved TKE            Walking B            Other Concerns             squatting     Nv? Sit<>Stands     Nv?  With foam.  2x10 w/ foam 2x10 foam      Modalities             CP/HP PRN

## 2023-10-24 NOTE — PROGRESS NOTES
PT Evaluation     Today's date: 10/24/2023  Patient name: Jordan Yen  : 1950  MRN: [id number]  Referring provider: Maggie Apple,*  Dx:   Encounter Diagnosis     ICD-10-CM    1. Chronic pain of right knee  M25.561     G89.29       2. Gait abnormality  R26.9                      Assessment  Assessment details: Pt at this time demonstrates improved range, strength, flexibility, and overall tolerance to activity. Pt at this time has not demonstrated improved pain levels, when asked if she intends to pursue more care, she did not intend to for PT or orthopedic care. Pt educated about continuing intervention for further strengthening and endurance however given pt is not a fall risk she opted to make her next visit on Friday 10/27/23 her last visit. PT will be in accord and a review of HEP as well as education too. Thank you very much for this kind referral.       Impairments: abnormal gait, abnormal or restricted ROM, activity intolerance, impaired balance, impaired physical strength, lacks appropriate home exercise program, pain with function, poor posture  and poor body mechanics  Understanding of Dx/Px/POC: good   Prognosis: good    Goals  STG 4 Weeks:  Decrease pain at worst to 5  Improve range to Ext R knee to -5 -met  Improve strength to improve b/l LE to 4/5. -met   Independent with HEP -met  LTG 8 Weeks:  Decrease pain at worst to 2  Improve range to improve ext R knee to 0 -met  Improve strength to improve B/L LE to 5/5.  -partial  Able to perform all desired activities with minimal to nil symptom exacerbation      Plan  Plan details: Last formal visit 10/27/23, she is welcome to return as needed.     Patient would benefit from: skilled physical therapy  Planned modality interventions: cryotherapy and thermotherapy: hydrocollator packs  Planned therapy interventions: joint mobilization, manual therapy, neuromuscular re-education, patient education, postural training, self care, strengthening, stretching, therapeutic activities, therapeutic exercise, therapeutic training, transfer training, IADL retraining, home exercise program, graded motor, graded exercise, graded activity, gait training, functional ROM exercises, flexibility, abdominal trunk stabilization, activity modification, behavior modification, body mechanics training and balance  Frequency: 2x week  Duration in weeks: 10  Treatment plan discussed with: patient        Subjective Evaluation    History of Present Illness  Date of onset: 2023  Mechanism of injury: Pt presents today stating that the pain in her knee is about the same as it was over the last few weeks. Pt reports that she has been performing her HEP few times a day. Pt reports there have been no falls within the last 4 weeks since beginning PT intervention. Pt reports that she does feel as though her balance might have improved a little bit over the last few weeks. Pt reports that she has 1 more scheduled PT intervention and would like to make that her last appointment. She is also going to have a MRI later this week of her brain and then will follow up with referring on 10/31/23. Her OT intervention at [facility] she intends to conclude soon as well.     Quality of life: good    Patient Goals  Patient goals for therapy: decreased pain, improved balance, increased motion, increased strength and return to sport/leisure activities    Pain  Current pain ratin  At best pain ratin  At worst pain ratin  Quality: dull ache and sharp  Relieving factors: rest and change in position  Aggravating factors: standing, walking and stair climbing  Progression: no change    Social Support  Steps to enter house: yes  Stairs in house: yes (first floor)   Lives in: multiple-level home  Lives with: adult children (Son is Bill Gates. )      Diagnostic Tests  No diagnostic tests performed        Objective     Active Range of Motion   Left Knee   Flexion: 130 (PT OP Nil) degrees   Extension: 0 (PT OP nil) degrees     Right Knee   Flexion: 135 (PT OP) degrees   Extension: 0 (PT OP -5) degrees     Additional Active Range of Motion Details  Forward head, rounded shoulders, Lordosis  Genu valgum R > L, antalgia. B/L Sensation intact to L3,4,5,S1,(S2 L < R)   DTR Patella Haven  Postural correction   Repeated motion   Flex WFL  Ext min  SB R Min*  SB L Min*   LE Strength  Hip   L Flex 4 Ext 5 Abd 4 Add 5  R Flex 4 Ext 5 Abd 4 Add 5  LE Screen  Strong and painless. Joint Mobility  Palpation  Sts  TUG:   15.4" //  11.5"   Medial knee pain  Walking     LE Repeated Movements  Unloaded knee ext  NE  Semi loaded P NW initially, then better, improved walking and TKE achievement.

## 2023-10-26 ENCOUNTER — HOSPITAL ENCOUNTER (OUTPATIENT)
Dept: MRI IMAGING | Facility: HOSPITAL | Age: 73
Discharge: HOME | DRG: 062 | End: 2023-10-26
Payer: MEDICARE

## 2023-10-26 DIAGNOSIS — R41.0 DELIRIUM: ICD-10-CM

## 2023-10-26 DIAGNOSIS — G91.2 NPH (NORMAL PRESSURE HYDROCEPHALUS) (HCC): ICD-10-CM

## 2023-10-26 PROCEDURE — G1004 CDSM NDSC: HCPCS

## 2023-10-26 PROCEDURE — 70551 MRI BRAIN STEM W/O DYE: CPT

## 2023-10-27 ENCOUNTER — HOSPITAL ENCOUNTER (INPATIENT)
Facility: HOSPITAL | Age: 73
LOS: 3 days | Discharge: HOME HEALTH | DRG: 062 | End: 2023-10-30
Attending: EMERGENCY MEDICINE | Admitting: INTERNAL MEDICINE
Payer: MEDICARE

## 2023-10-27 ENCOUNTER — OFFICE VISIT (OUTPATIENT)
Dept: PHYSICAL THERAPY | Facility: CLINIC | Age: 73
End: 2023-10-27
Payer: MEDICARE

## 2023-10-27 DIAGNOSIS — E03.9 HYPOTHYROIDISM, UNSPECIFIED TYPE: ICD-10-CM

## 2023-10-27 DIAGNOSIS — I10 HIGH BLOOD PRESSURE: ICD-10-CM

## 2023-10-27 DIAGNOSIS — R06.00 DYSPNEA, UNSPECIFIED TYPE: ICD-10-CM

## 2023-10-27 DIAGNOSIS — Z79.4 TYPE 2 DIABETES MELLITUS WITHOUT COMPLICATION, WITH LONG-TERM CURRENT USE OF INSULIN (HCC): ICD-10-CM

## 2023-10-27 DIAGNOSIS — R29.90 STROKE-LIKE SYMPTOMS: ICD-10-CM

## 2023-10-27 DIAGNOSIS — R29.898 WEAKNESS OF BOTH LOWER EXTREMITIES: ICD-10-CM

## 2023-10-27 DIAGNOSIS — M25.561 CHRONIC PAIN OF RIGHT KNEE: Primary | ICD-10-CM

## 2023-10-27 DIAGNOSIS — E78.5 HYPERLIPIDEMIA, UNSPECIFIED HYPERLIPIDEMIA TYPE: ICD-10-CM

## 2023-10-27 DIAGNOSIS — E11.9 TYPE 2 DIABETES MELLITUS WITHOUT COMPLICATION, WITH LONG-TERM CURRENT USE OF INSULIN (HCC): ICD-10-CM

## 2023-10-27 DIAGNOSIS — I63.9 CEREBROVASCULAR ACCIDENT (CVA), UNSPECIFIED MECHANISM (HCC): ICD-10-CM

## 2023-10-27 DIAGNOSIS — R26.9 GAIT ABNORMALITY: ICD-10-CM

## 2023-10-27 DIAGNOSIS — I10 PRIMARY HYPERTENSION: ICD-10-CM

## 2023-10-27 DIAGNOSIS — R47.01 APHASIA: Primary | ICD-10-CM

## 2023-10-27 DIAGNOSIS — Z87.440 HISTORY OF RECURRENT UTIS: ICD-10-CM

## 2023-10-27 DIAGNOSIS — G89.29 CHRONIC PAIN OF RIGHT KNEE: Primary | ICD-10-CM

## 2023-10-27 DIAGNOSIS — R41.0 CONFUSION: ICD-10-CM

## 2023-10-27 PROBLEM — Z98.890 HISTORY OF NISSEN FUNDOPLICATION: Status: ACTIVE | Noted: 2023-10-27

## 2023-10-27 PROBLEM — Z90.49 HISTORY OF CHOLECYSTECTOMY: Status: ACTIVE | Noted: 2023-10-27

## 2023-10-27 PROBLEM — N18.4 CKD (CHRONIC KIDNEY DISEASE) STAGE 4, GFR 15-29 ML/MIN (HCC): Status: ACTIVE | Noted: 2023-10-27

## 2023-10-27 PROBLEM — G47.33 OSA (OBSTRUCTIVE SLEEP APNEA): Status: ACTIVE | Noted: 2023-10-27

## 2023-10-27 LAB
2HR DELTA HS TROPONIN: 0 NG/L
ANION GAP SERPL CALCULATED.3IONS-SCNC: 8 MMOL/L
APTT PPP: 27 SECONDS (ref 23–37)
ATRIAL RATE: 64 BPM
BACTERIA UR QL AUTO: ABNORMAL /HPF
BILIRUB UR QL STRIP: NEGATIVE
BUN SERPL-MCNC: 29 MG/DL (ref 5–25)
CALCIUM SERPL-MCNC: 9.5 MG/DL (ref 8.4–10.2)
CARDIAC TROPONIN I PNL SERPL HS: 9 NG/L
CARDIAC TROPONIN I PNL SERPL HS: 9 NG/L
CHLORIDE SERPL-SCNC: 103 MMOL/L (ref 96–108)
CLARITY UR: CLEAR
CO2 SERPL-SCNC: 26 MMOL/L (ref 21–32)
COLOR UR: COLORLESS
CREAT SERPL-MCNC: 2.33 MG/DL (ref 0.6–1.3)
ERYTHROCYTE [DISTWIDTH] IN BLOOD BY AUTOMATED COUNT: 15.9 % (ref 11.6–15.1)
FLUAV RNA RESP QL NAA+PROBE: NEGATIVE
FLUBV RNA RESP QL NAA+PROBE: NEGATIVE
GFR SERPL CREATININE-BSD FRML MDRD: 11 ML/MIN/1.73SQ M
GLUCOSE SERPL-MCNC: 132 MG/DL (ref 65–140)
GLUCOSE SERPL-MCNC: 151 MG/DL (ref 65–140)
GLUCOSE SERPL-MCNC: 167 MG/DL (ref 65–140)
GLUCOSE UR STRIP-MCNC: ABNORMAL MG/DL
HCT VFR BLD AUTO: 37.3 % (ref 34.8–46.1)
HGB BLD-MCNC: 11.9 G/DL (ref 11.5–15.4)
HGB UR QL STRIP.AUTO: ABNORMAL
INR PPP: 0.84 (ref 0.84–1.19)
KETONES UR STRIP-MCNC: NEGATIVE MG/DL
LEUKOCYTE ESTERASE UR QL STRIP: NEGATIVE
MCH RBC QN AUTO: 28.2 PG (ref 26.8–34.3)
MCHC RBC AUTO-ENTMCNC: 31.9 G/DL (ref 31.4–37.4)
MCV RBC AUTO: 88 FL (ref 82–98)
NITRITE UR QL STRIP: NEGATIVE
NON-SQ EPI CELLS URNS QL MICRO: ABNORMAL /HPF
P AXIS: 63 DEGREES
PH UR STRIP.AUTO: 6.5 [PH]
PLATELET # BLD AUTO: 282 THOUSANDS/UL (ref 149–390)
PMV BLD AUTO: 11 FL (ref 8.9–12.7)
POTASSIUM SERPL-SCNC: 3.9 MMOL/L (ref 3.5–5.3)
PR INTERVAL: 242 MS
PROT UR STRIP-MCNC: ABNORMAL MG/DL
PROTHROMBIN TIME: 12.1 SECONDS (ref 11.6–14.5)
QRS AXIS: 1 DEGREES
QRSD INTERVAL: 80 MS
QT INTERVAL: 426 MS
QTC INTERVAL: 439 MS
RBC # BLD AUTO: 4.22 MILLION/UL (ref 3.81–5.12)
RBC #/AREA URNS AUTO: ABNORMAL /HPF
RSV RNA RESP QL NAA+PROBE: NEGATIVE
SARS-COV-2 RNA RESP QL NAA+PROBE: NEGATIVE
SODIUM SERPL-SCNC: 137 MMOL/L (ref 135–147)
SP GR UR STRIP.AUTO: 1.03 (ref 1–1.03)
T WAVE AXIS: 7 DEGREES
UROBILINOGEN UR STRIP-ACNC: <2 MG/DL
VENTRICULAR RATE: 64 BPM
WBC # BLD AUTO: 11.46 THOUSAND/UL (ref 4.31–10.16)
WBC #/AREA URNS AUTO: ABNORMAL /HPF

## 2023-10-27 PROCEDURE — 96374 THER/PROPH/DIAG INJ IV PUSH: CPT

## 2023-10-27 PROCEDURE — 36415 COLL VENOUS BLD VENIPUNCTURE: CPT | Performed by: EMERGENCY MEDICINE

## 2023-10-27 PROCEDURE — 99285 EMERGENCY DEPT VISIT HI MDM: CPT

## 2023-10-27 PROCEDURE — 80048 BASIC METABOLIC PNL TOTAL CA: CPT | Performed by: EMERGENCY MEDICINE

## 2023-10-27 PROCEDURE — 93005 ELECTROCARDIOGRAM TRACING: CPT

## 2023-10-27 PROCEDURE — 97112 NEUROMUSCULAR REEDUCATION: CPT

## 2023-10-27 PROCEDURE — 99291 CRITICAL CARE FIRST HOUR: CPT | Performed by: PSYCHIATRY & NEUROLOGY

## 2023-10-27 PROCEDURE — 99291 CRITICAL CARE FIRST HOUR: CPT | Performed by: EMERGENCY MEDICINE

## 2023-10-27 PROCEDURE — 81001 URINALYSIS AUTO W/SCOPE: CPT

## 2023-10-27 PROCEDURE — 97110 THERAPEUTIC EXERCISES: CPT

## 2023-10-27 PROCEDURE — 85027 COMPLETE CBC AUTOMATED: CPT | Performed by: EMERGENCY MEDICINE

## 2023-10-27 PROCEDURE — 82948 REAGENT STRIP/BLOOD GLUCOSE: CPT

## 2023-10-27 PROCEDURE — 84484 ASSAY OF TROPONIN QUANT: CPT | Performed by: EMERGENCY MEDICINE

## 2023-10-27 PROCEDURE — 84484 ASSAY OF TROPONIN QUANT: CPT

## 2023-10-27 PROCEDURE — 93010 ELECTROCARDIOGRAM REPORT: CPT | Performed by: INTERNAL MEDICINE

## 2023-10-27 PROCEDURE — NC001 PR NO CHARGE: Performed by: PSYCHIATRY & NEUROLOGY

## 2023-10-27 PROCEDURE — C9113 INJ PANTOPRAZOLE SODIUM, VIA: HCPCS

## 2023-10-27 PROCEDURE — 85730 THROMBOPLASTIN TIME PARTIAL: CPT | Performed by: EMERGENCY MEDICINE

## 2023-10-27 PROCEDURE — 0241U HB NFCT DS VIR RESP RNA 4 TRGT: CPT | Performed by: EMERGENCY MEDICINE

## 2023-10-27 PROCEDURE — 3E03317 INTRODUCTION OF OTHER THROMBOLYTIC INTO PERIPHERAL VEIN, PERCUTANEOUS APPROACH: ICD-10-PCS | Performed by: PSYCHIATRY & NEUROLOGY

## 2023-10-27 PROCEDURE — NC001 PR NO CHARGE: Performed by: INTERNAL MEDICINE

## 2023-10-27 PROCEDURE — 85610 PROTHROMBIN TIME: CPT | Performed by: EMERGENCY MEDICINE

## 2023-10-27 RX ORDER — LEVOTHYROXINE SODIUM 0.12 MG/1
125 TABLET ORAL
Status: DISCONTINUED | OUTPATIENT
Start: 2023-10-28 | End: 2023-10-30 | Stop reason: HOSPADM

## 2023-10-27 RX ORDER — PRAMIPEXOLE DIHYDROCHLORIDE 0.25 MG/1
0.25 TABLET ORAL
Status: DISCONTINUED | OUTPATIENT
Start: 2023-10-27 | End: 2023-10-30 | Stop reason: HOSPADM

## 2023-10-27 RX ORDER — DULOXETIN HYDROCHLORIDE 30 MG/1
30 CAPSULE, DELAYED RELEASE ORAL DAILY
Status: DISCONTINUED | OUTPATIENT
Start: 2023-10-28 | End: 2023-10-30 | Stop reason: HOSPADM

## 2023-10-27 RX ORDER — PANTOPRAZOLE SODIUM 40 MG/10ML
40 INJECTION, POWDER, LYOPHILIZED, FOR SOLUTION INTRAVENOUS 2 TIMES DAILY
Status: DISCONTINUED | OUTPATIENT
Start: 2023-10-27 | End: 2023-10-30

## 2023-10-27 RX ORDER — INSULIN LISPRO 100 [IU]/ML
1-6 INJECTION, SOLUTION INTRAVENOUS; SUBCUTANEOUS EVERY 6 HOURS SCHEDULED
Status: DISCONTINUED | OUTPATIENT
Start: 2023-10-27 | End: 2023-10-28

## 2023-10-27 RX ORDER — CHLORHEXIDINE GLUCONATE ORAL RINSE 1.2 MG/ML
15 SOLUTION DENTAL EVERY 12 HOURS SCHEDULED
Status: DISCONTINUED | OUTPATIENT
Start: 2023-10-27 | End: 2023-10-30 | Stop reason: HOSPADM

## 2023-10-27 RX ORDER — MELATONIN
1000 DAILY
Status: DISCONTINUED | OUTPATIENT
Start: 2023-10-28 | End: 2023-10-30 | Stop reason: HOSPADM

## 2023-10-27 RX ORDER — GABAPENTIN 100 MG/1
100 CAPSULE ORAL
Status: DISCONTINUED | OUTPATIENT
Start: 2023-10-27 | End: 2023-10-30 | Stop reason: HOSPADM

## 2023-10-27 RX ORDER — CEPHALEXIN 250 MG/1
250 CAPSULE ORAL
Status: DISCONTINUED | OUTPATIENT
Start: 2023-10-27 | End: 2023-10-30 | Stop reason: HOSPADM

## 2023-10-27 RX ORDER — HYDRALAZINE HYDROCHLORIDE 20 MG/ML
10 INJECTION INTRAMUSCULAR; INTRAVENOUS ONCE
Status: COMPLETED | OUTPATIENT
Start: 2023-10-27 | End: 2023-10-27

## 2023-10-27 RX ORDER — PRAVASTATIN SODIUM 40 MG
40 TABLET ORAL
Status: DISCONTINUED | OUTPATIENT
Start: 2023-10-27 | End: 2023-10-30 | Stop reason: HOSPADM

## 2023-10-27 RX ORDER — FERROUS SULFATE 325(65) MG
325 TABLET ORAL
Status: DISCONTINUED | OUTPATIENT
Start: 2023-10-28 | End: 2023-10-30 | Stop reason: HOSPADM

## 2023-10-27 RX ADMIN — NICARDIPINE HYDROCHLORIDE 2.5 MG/HR: 2.5 INJECTION, SOLUTION INTRAVENOUS at 18:05

## 2023-10-27 RX ADMIN — INSULIN LISPRO 1 UNITS: 100 INJECTION, SOLUTION INTRAVENOUS; SUBCUTANEOUS at 23:55

## 2023-10-27 RX ADMIN — PANTOPRAZOLE SODIUM 40 MG: 40 INJECTION, POWDER, FOR SOLUTION INTRAVENOUS at 18:28

## 2023-10-27 RX ADMIN — HYDRALAZINE HYDROCHLORIDE 10 MG: 20 INJECTION, SOLUTION INTRAMUSCULAR; INTRAVENOUS at 17:02

## 2023-10-27 RX ADMIN — INSULIN DETEMIR 35 UNITS: 100 INJECTION, SOLUTION SUBCUTANEOUS at 22:04

## 2023-10-27 RX ADMIN — Medication 19 MG: at 15:58

## 2023-10-27 RX ADMIN — IOHEXOL 85 ML: 350 INJECTION, SOLUTION INTRAVENOUS at 15:13

## 2023-10-27 NOTE — ASSESSMENT & PLAN NOTE
Lab Results   Component Value Date    EGFR 11 10/27/2023    CREATININE 2.33 (H) 10/27/2023   Patient's baseline is 2-2.16  Continue to monitor

## 2023-10-27 NOTE — H&P
[facility]  H&P  Name: Florin Youngblood 75 y.o. female I MRN: [id number]  Unit/Bed#: [facility] I Date of Admission: 10/27/2023   Date of Service: 10/27/2023 I Hospital Day: 0      Assessment/Plan   * Stroke-like symptom  Assessment & Plan  No results found for: "[address] [facility]", "HDL", "CHOLESTEROL", "HGBA1C"    Coming in as a stroke alert with last known well around 2:15 PM 10/2723. Baseline: Gestational and able to follow commands, waxing and waning confusion. Noted to have severe headache and difficulty forming words then became completely mute     CT head:No mass effect, acute intracranial hemorrhage or evidence of recent infarction. Mild to moderate chronic microvascular ischemic change. CTA: No evidence for high-grade stenosis, focal occlusion or vascular aneurysm of the cervical or intracranial vessels  MRI: Pending  Echocardiogram: Pending  Telemetry: Will order     Plan:  Neuro onboard  Tenecteplace given 3: 58 pm  Repeat CT head after 24 hours  No AP/AC for at least 24 hours s/p TNKtPA  STAT CT Head  if worsening of symptoms  BP goal less than 180/105. MRI brain routine (inspire device is already turned off)- pt has other chart I reviewed this in PACS and MRI brain neuroquant yesterday with no acute pathology  Other infectious metabolic work up as warrnated per primary team  Admit to ICU for close monitoring, stroke protocol. /105 or lesser as s/p TNKtPA  Placed on stroke pathway  Telemetry  MRI  ECHO  DVT prophylaxis with enoxaparin  speech consult, PT, OT      Type 2 diabetes mellitus ([address])  Assessment & Plan  Last hemoglobin A1c on 10/3/2023 equals 7.3%    No results for input(s): "POCGLU" in the last 72 hours.     Blood Sugar Average: Last 72 hrs:    Home medication: Insulin detemir 50 units every evening, NovoLog 18, 18, 25  Continue home medication  Sliding scale  Diabetic diet    Hypothyroidism  Assessment & Plan  125 mcg once daily    CKD (chronic kidney disease) stage 4, GFR 15-29 ml/min [facility])  Assessment & Plan  Lab Results   Component Value Date    EGFR 11 10/27/2023    CREATININE 2.33 (H) 10/27/2023   Patient's baseline is 2-2.16  Continue to monitor    Dyspnea  Assessment & Plan  Previous PFTs on 8/23/2022 FEV1/FVC ratio of 81%, FVC 84%, FEV1 86%, DLCO 57%. Normal spirometry. moderately decreased diffusion capacity  Albuterol once daily     History of recurrent UTIs  Assessment & Plan  Had multiple enterococcal cloaca UTIs in the past  Cefelexin for 30 days through 11/11  Continue    SHAYAN (obstructive sleep apnea)  Assessment & Plan  Patient had inspire on 11/10/2021 however patient is not compliant with this    History of cholecystectomy  Assessment & Plan  Previous cholecystectomy    History of Nissen fundoplication  Assessment & Plan  Previous Nissen fundoplication and had  thoracostomy to [facility] in 2015  On pantoprazole 40 mg twice daily    HLD (hyperlipidemia)  Assessment & Plan  Rosuvastatin 5 mg daily at home  Hold for now    HTN (hypertension)  Assessment & Plan  Home medication: Losartan 25 mg once daily, torsemide 5 mg once daily as needed if weight exceeds 172 pounds, metoprolol tartrate 50 mg every 12 hours, amlodipine 5 mg once daily  Cardene drip prn for BP >180 sbp           History of Present Illness     HPI: Thalia Weems is a 75 y. o. who presents with confusion and slurring of speech. PMHx of hypertension, hyperlipidemia, diabetes mellitus, hypothyroidism and SHAYAN. Last known normal is 2.30pm as per her . History obtained by  as patient was replying in one word answers. Last known normal is 2.30pm. Her  states patient has been confused since past 1 month, and has had recurrent UTIs after which she becomes confused. Today she complained of a severe headache while seated on her couch, not relieved with Tylenol. She started slurring her words and became mute.  He helped her to the bathroom as she was stumbling but no weakness specifically noted on either side. At that point he called EMS. Patient was able to walk to the ambulance. Patient denies difficulty sleeping, appetite is normal, no abdominal or supra pubic pain. Denies dysuria, incontinence but has urgency. She is being treated for recurrent UTI with 1 month of cephalexin. Quit smoking more than 50 years ago. Last alcoholic drink more than a year ago. History obtained from spouse. Review of Systems   Constitutional:  Negative for chills and fever. HENT:  Negative for ear pain and sore throat. Eyes:  Negative for pain and visual disturbance. Respiratory:  Negative for cough and shortness of breath. Cardiovascular:  Negative for chest pain and palpitations. Gastrointestinal:  Negative for abdominal pain and vomiting. Genitourinary:  Negative for dysuria and hematuria. Musculoskeletal:  Negative for arthralgias and back pain. Skin:  Negative for color change and rash. Neurological:  Positive for headaches. Negative for dizziness, seizures, syncope and light-headedness. All other systems reviewed and are negative. Historical Information   No past medical history on file. No past surgical history on file. No current outpatient medications Not on File     No family history on file. Objective                            Vitals I/O      Most Recent Min/Max in 24hrs   Temp 98 °F (36.7 °C) Temp  Min: 98 °F (36.7 °C)  Max: 98 °F (36.7 °C)   Pulse 66 Pulse  Min: 62  Max: 66   Resp 18 Resp  Min: 14  Max: 24   /68 BP  Min: 152/68  Max: 191/91   O2 Sat 96 % SpO2  Min: 93 %  Max: 96 %    No intake or output data in the 24 hours ending 10/27/23 1741      Diet Damian/CHO Controlled; Consistent Carbohydrate Diet Level 2 (5 carb servings/75 grams CHO/meal)     Invasive Monitoring Physical exam    Physical Exam  Eyes:      Extraocular Movements: Extraocular movements intact. Pupils: Pupils are equal, round, and reactive to light.    HENT:      Head: Normocephalic and atraumatic. Right Ear: Hearing normal.      Left Ear: Hearing normal.   Cardiovascular:      Rate and Rhythm: Normal rate and regular rhythm. Heart sounds: Normal heart sounds. Constitutional:       Appearance: She is underdeveloped and malnourished. Pulmonary:      Effort: Pulmonary effort is normal.      Breath sounds: Normal breath sounds. Psychiatric:         Speech: Speech is not no receptive aphasia or no expressive aphasia. Neurological:      General: No focal deficit present. Mental Status: She is alert. She is calm, disoriented to place, disoriented to time and disoriented to situation. Cranial Nerves: No dysarthria or facial asymmetry. Motor: Motor deficit. Comments: Appropriately able to tell date of birth. Answers yes for most questions. Unable to follow simple commands like raise your hands up. No facial asymmetry or focal weakness noted. Diagnostic Studies      EKG: Sinus rhythm with 1st degree AV block   Imaging: CT brain, head and neck I have personally reviewed pertinent reports.        Medications:  Scheduled PRN        Continuous    niCARdipine, 1-15 mg/hr         Labs:    CBC    Recent Labs     10/27/23  1515   WBC 11.46*   HGB 11.9   HCT 37.3        BMP    Recent Labs     10/27/23  1515   SODIUM 137   K 3.9      CO2 26   AGAP 8   BUN 29*   CREATININE 2.33*   CALCIUM 9.5       Coags    Recent Labs     10/27/23  1515   INR 0.84   PTT 27        Additional Electrolytes  No recent results       Blood Gas    No recent results  No recent results LFTs  No recent results    Infectious  No recent results  Glucose  Recent Labs     10/27/23  1515   GLUC 151*           Anticipated Length of Stay is > 2 midnights  Shruthi Pleitez MD

## 2023-10-27 NOTE — ASSESSMENT & PLAN NOTE
No results found for: "[address] [facility]", "HDL", "CHOLESTEROL", "HGBA1C"    Coming in as a stroke alert with last known well around 2:15 PM 10/2723. Baseline: Gestational and able to follow commands, waxing and waning confusion. Noted to have severe headache and difficulty forming words then became completely mute     CT head:No mass effect, acute intracranial hemorrhage or evidence of recent infarction. Mild to moderate chronic microvascular ischemic change. CTA: No evidence for high-grade stenosis, focal occlusion or vascular aneurysm of the cervical or intracranial vessels  MRI: Pending  Echocardiogram: Pending  Telemetry: Will order     Plan:  Neuro onboard  Tenecteplace given 3: 58 pm  Repeat CT head after 24 hours  No AP/AC for at least 24 hours s/p TNKtPA  STAT CT Head  if worsening of symptoms  BP goal less than 180/105. MRI brain routine (inspire device is already turned off)- pt has other chart I reviewed this in PACS and MRI brain neuroquant yesterday with no acute pathology  Other infectious metabolic work up as warrnated per primary team  Admit to ICU for close monitoring, stroke protocol.  /105 or lesser as s/p TNKtPA  Placed on stroke pathway  Telemetry  MRI  ECHO  DVT prophylaxis with enoxaparin  speech consult, PT, OT

## 2023-10-27 NOTE — ASSESSMENT & PLAN NOTE
Previous PFTs on 8/23/2022 FEV1/FVC ratio of 81%, FVC 84%, FEV1 86%, DLCO 57%. Normal spirometry.    moderately decreased diffusion capacity  Albuterol once daily

## 2023-10-27 NOTE — ASSESSMENT & PLAN NOTE
Previous Nissen fundoplication and had  thoracostomy to [facility] in 2015  On pantoprazole 40 mg twice daily

## 2023-10-27 NOTE — CONSULTS
Consultation - Stroke   Vinay Contreras 75 y.o. female MRN: [id number]  Unit/Bed#: ED-30 Encounter: [id number]      Assessment/Plan     Stroke-like symptom  Assessment & Plan  65yo F w/ a PMH of HTN, HLD, SHAYAN on inspire device (off since not using),  peripheral neuropathy, memory loss, past UTIs where she has had confusion ( notes confusion for the past 1 month), DM, firbomylagia, depression, ambulatory dysfunction(uses walker)  Presented as stroke alert on 10/27/2023  3:10 PM with initial NIHSS of 14 and LKW 2:15pm 10/27/2023 , initial Blood Pressure: (!) 185/92. Initial presenting deficits were headache then progressed to aphasia and being mute. Pt was found to be a thrombolysis candidate within the appropriate time window and without obvious contraindication and TNK was given. Of note patient has a baseline of chronic confusion over the past 1 month but patient was able to appropriately answer questions and follow commands throughout most of the month and including this morning during which time until the event around 2 PM when patient was noted to be having a severe headache.    does note that patient does have some memory issues that have been ongoing  Been getting worked up as patient has seen outpatient neurology earlier in the month and just had an MRI done 10/26/2023 that did not show any acute abnormal findings/atrophy    Post thrombolysis exam 10/27/2023: Patient still aphasic and unable to properly answer questions or follow commands appropriately, she does say a few words such as "way or out or tired, patient had antigravity in all 4 extremities but only her left upper extremity did not drift and the other 3 (RUE, LLE and RLE) drifted   Current Blood Pressure: (!) 171/83, BP over last 24 hours: BP  Min: 171/83  Max: 185/92   Vascular risk factors: HTN, HLD, SHAYAN  Home meds: no ac/ap     Workup:  No results found for: "HGBA1C", "CHOLESTEROL", "LDLCALC", "TRIG", "INR"   CTH: No mass effect, acute intracranial hemorrhage or evidence of recent infarction.  Mild to moderate chronic microvascular ischemic change   CTA: No evidence for high-grade stenosis, focal occlusion or vascular aneurysm of the cervical or intracranial vessels   MRI: pending, of note patient has the inspire device for sleep apnea but per , it is off as patient does have an MRI on 10/26 2023  Repeat CTH at 24 hours: pending   Echocardiogram: pending   Telemetry: no events      Vit B12 234  TSH 2.39, free T4 0.92 (T4 WNL)    Pertinent scores:  - NIHSS: 14 (at initial stroke alert)   Stroke Modified [place] Score: 4 (Moderately severe disability; unable to walk and attend to bodily needs without assistance)    Impression: Concern for acute stroke given patient's noted aphasia onset but patient has noted bilateral lower extremity weakness which doesn't quite fit a stroke, could also be hypertensive encephalopathy but regardless should undergo stroke w/u    Plan:  - Stroke pathway  - Discussed plan with neurology attending, Dr. Violet Lomas  - BP: BP as close to but <180 SBP due to recent tPA administration  - With thrombolysis admin monitor BP and neuro exam q 15 mins x 2 hours then q 30 mins x 6 hours then q1 hour x 24 hours  - If SBP is >180, increase frequency of BP measurements and administer antihypertensive agents as needed to maintain at or below 180 SBP  - recommend routine EEG given possible concern for seizures   - Obtain lipids and A1c  - atorvastatin 40mg qhs  - Maintain glucose <180, SSI for coverage if indicated  - Repeat CTH if GCS drops 2pts in 1hr or if post thrombolysis if pt is reporting severe headache, acute increase in BP, N/V  - Medical management as per primary team appreciated  - TTE  - MRI brain pending  - DVT ppx and SCDs  - DVT ppx held for 24 hours due to thrombolysis administration  - Monitor on telemetry  - PT/OT/Speech/PM&R input appreciated  - Repeat CTH at 24 hours after thrombolysis administration prior to starting AP/AC  - Stroke education          Thrombolytic Decision: After a discussion of risks, benefits and alternatives (including best medical therapy excluding thrombolysis) reviewing inclusion and exclusion criteria the decision was made to proceed with thrombolytic therapy. Verbal consent was obtained from acting surrogate decision maker  . Consent was obtained byMando Salcedo DO       Recommendations for outpatient neurological follow up have yet to be determined. History of Present Illness     Reason for Consult / Principal Problem: stroke alert  Hx and PE limited by: aphasia and unable to follow commands  Patient last known well: 2:15 PM 10/27/2023  Stroke alert called: 3:03 PM 10/27/2023  Neurology time of arrival: 3:05 PM 10/27/2023  HPI: Shivam Lindquist is a 75 y.o.  female w/ a PMH of HTN, HLD, SHAYAN on inspire device (off since not using),  peripheral neuropathy, memory loss, past UTIs where she has had confusion ( notes confusion for the past 1 month), DM, firbomylagia, depression, ambulatory dysfunction(uses walker)  Coming in as a stroke alert with last known well around 2:15 PM 10/2723. Per , patient was in in her normal state of health given that she has some regular baseline confusion has been for the past month but was able to answer questions appropriately and follow commands and to go about her daily morning until about 2:15 PM.  At that time patient was sitting in the couch and she noted that she was having a very severe headache. This then progressed to patient having difficulty forming words and then eventually patient being completely mute. At that time patient's  was very concerned for possible stroke and then called 911 for patient to be evaluated in the hospital.    Patient arrived in the hospital her blood pressure was around the 190s with her blood glucose around 170s.   Patient was unable to follow any appropriate commands or answer any questions appropriately. She was able to say some short phrases such as please stop or that she is tired or stop. Otherwise, patient was unable to follow any commands or answer questions appropriately. CT head and CTA head and neck did not show any acute abnormalities. Patient was not on any blood thinners or antiplatelet agents at home. Given that patient, had no contraindications to TNK and patient denied any recent head trauma or any bleeding in the past 3 months and without any blood thinners, patient was administered TNK. Risk and benefits were discussed with  and  was agreeable to getting TNK for patient     did note that patient's baseline is she has been more confused over the past 1 month but is able to answer questions appropriately and follow commands. He did does notice a little bit more confusion than normal and he denies her having of seizures or strokes in the past.  He did state that she has had episodes when she had a UTI in the past where she would mumble and be little confused but never how she currently is presenting. Per chart review patient uses a walker at baseline for ambulation. Consults    Review of Systems   Unable to perform ROS: Patient nonverbal       Historical Information   No past medical history on file. No past surgical history on file. Social History   Social History     Substance and Sexual Activity   Alcohol Use Not on file     Social History     Substance and Sexual Activity   Drug Use Not on file     No existing history information found. No existing history information found. Social History     Tobacco Use   Smoking Status Not on file   Smokeless Tobacco Not on file     Family History: No family history on file. Review of previous medical records was  completed.      Meds/Allergies   all current active meds have been reviewed, current meds:   Current Facility-Administered Medications   Medication Dose Route Frequency    hydrALAZINE (APRESOLINE) injection 10 mg  10 mg Intravenous Once   , and PTA meds:   None       Not on File    Objective   Vitals:Blood pressure (!) 188/99, pulse 63, temperature 98 °F (36.7 °C), temperature source Axillary, resp. rate 16, weight 77.9 kg (171 lb 11.8 oz), SpO2 95 %. ,There is no height or weight on file to calculate BMI. No intake or output data in the 24 hours ending 10/27/23 1636    Invasive Devices: Invasive Devices       Peripheral Intravenous Line  Duration             Peripheral IV 10/27/23 Dorsal (posterior); Left Hand <1 day    Peripheral IV 10/27/23 Right Antecubital <1 day                    Physical Exam  Vitals and nursing note reviewed. Constitutional:       General: She is not in acute distress. Appearance: She is well-developed. HENT:      Head: Normocephalic and atraumatic. Eyes:      Extraocular Movements: EOM normal.      Conjunctiva/sclera: Conjunctivae normal.      Pupils: Pupils are equal, round, and reactive to light. Cardiovascular:      Rate and Rhythm: Normal rate. Abdominal:      Palpations: Abdomen is soft. Tenderness: There is no abdominal tenderness. Musculoskeletal:         General: No swelling. Cervical back: Neck supple. Skin:     General: Skin is warm and dry. Capillary Refill: Capillary refill takes less than 2 seconds. Neurological:      Mental Status: She is alert. Deep Tendon Reflexes:      Reflex Scores:       Tricep reflexes are 1+ on the right side and 1+ on the left side. Bicep reflexes are 1+ on the right side and 1+ on the left side. Brachioradialis reflexes are 1+ on the right side and 1+ on the left side. Patellar reflexes are 1+ on the right side and 1+ on the left side. Achilles reflexes are 1+ on the right side and 1+ on the left side.   Psychiatric:         Mood and Affect: Mood normal.       Neurologic Exam     Mental Status   Level of consciousness: arousable by verbal stimuli      Patient was noted to be aphasic and unable to answer questions appropriately or follow any commands appropriately     Cranial Nerves     CN II   Visual fields full to confrontation. CN III, IV, VI   Pupils are equal, round, and reactive to light. Extraocular motions are normal.     CN V   Facial sensation intact. CN VII   Facial expression full, symmetric. CN IX, X   CN IX normal.   CN X normal.     CN XI   CN XI normal.     CN XII   CN XII normal.   Blink  blink to threat appropriately intact bilaterally  Patient able to cross midline and track appropriately     Motor Exam   Right arm pronator drift: present  Left arm pronator drift: absent  Left upper extremity 3 out of 5  Right upper extremity 3 out of 5 but drift and hit bed   Bilateral lower extremity 3 out of 5 initially but then had drift and hit the bed       Sensory Exam   Light touch normal.   Pinprick normal.     Gait, Coordination, and Reflexes     Reflexes   Right brachioradialis: 1+  Left brachioradialis: 1+  Right biceps: 1+  Left biceps: 1+  Right triceps: 1+  Left triceps: 1+  Right patellar: 1+  Left patellar: 1+  Right achilles: 1+  Left achilles: 1+  Right plantar: normal  Left plantar: normal  Right ankle clonus: absent  Left ankle clonus: absent  Unable to assess any sort of coordination       NIHSS:  1a.Level of Consciousness: 2 = Not alert, requires repeated stimulation to attend   1b. LOC Questions: 2 = Answers neither correctly   1c. LOC Commands: 0 = Obeys both correctly   2. Best Gaze: 0 = Normal   3. Visual: 0 = No visual field loss   4. Facial Palsy: 0=Normal symmetric movement   5a. Motor Right Arm: 2=Some effort against gravity, limb cannot get to or maintain (if cured) 90 (or 45) degrees, drifts down to bed, but has some effort against gravity   5b. Motor Left Arm: 0=No drift, limb holds 90 (or 45) degrees for full 10 seconds   6a.  Motor Right Le=Some effort against gravity, limb cannot get to or maintain (if cured) 90 (or 45) degrees, drifts down to bed, but has some effort against gravity   6b. Motor Left Le=Some effort against gravity, limb cannot get to or maintain (if cured) 90 (or 45) degrees, drifts down to bed, but has some effort against gravity   7. Limb Ataxia:  0=Absent   8. Sensory: 0=Normal; no sensory loss   9. Best Language:  2=Severe aphasia; fragmentary expression, inference needed, cannot identify materials   10. Dysarthria: 0=Normal articulation   11. Extinction and Inattention (formerly Neglect): 0=No abnormality   Total Score: 14     Time NIHSS was completed: 3:10pm 10/27/2023    Modified [place] Score:  4 (Moderately severe disability; unable to walk and attend to bodily needs without assistance)    Lab Results: I have personally reviewed pertinent reports. , CBC:   Results from last 7 days   Lab Units 10/27/23  1515   WBC Thousand/uL 11.46*   RBC Million/uL 4.22   HEMOGLOBIN g/dL 11.9   HEMATOCRIT % 37.3   MCV fL 88   PLATELETS Thousands/uL 282   , BMP/CMP:   Results from last 7 days   Lab Units 10/27/23  1515   SODIUM mmol/L 137   POTASSIUM mmol/L 3.9   CHLORIDE mmol/L 103   CO2 mmol/L 26   BUN mg/dL 29*   CREATININE mg/dL 2.33*   CALCIUM mg/dL 9.5   EGFR ml/min/1.73sq m 11   , Vitamin B12:   , HgBA1C:   , TSH:   , Coagulation:   Results from last 7 days   Lab Units 10/27/23  1515   INR  0.84   , Lipid Profile:   , Ammonia:   , Urinalysis:       Invalid input(s): "URIBILINOGEN", Drug Screen:   , Medication Drug Levels:       Invalid input(s): "CARBAMAZEPINE", "LACOSAMIDE", "OXCARBAZEPINE"  Imaging Studies: I have personally reviewed pertinent reports. and I have personally reviewed pertinent films in PACS  EKG, Pathology, and Other Studies: I have personally reviewed pertinent reports.    and I have personally reviewed pertinent films in PACS  VTE Prophylaxis: Sequential compression device Clayton Andersen)     Code Status: No Order  Advance Directive and Living Will:      Power of :    POLST: Counseling / Coordination of Care  Total time spent today 60 minutes. Greater than 50% of total time was spent with the patient and / or family counseling and / or coordination of care.  A description of the counseling / coordination of care: yes

## 2023-10-27 NOTE — PROGRESS NOTES
Daily Note     Today's date: 10/27/2023  Patient name: Judith Foster  : 1950  MRN: [id number]  Referring provider: Isaac Neal,*  Dx:   Encounter Diagnosis     ICD-10-CM    1. Chronic pain of right knee  M25.561     G89.29       2. Gait abnormality  R26.9           Start Time: 1125  Stop Time: 1152  Total time in clinic (min): 27 minutes    Subjective: Pt reports that she is ready for DC. Objective: See treatment diary below      Assessment: Tolerated treatment well. Patient exhibited good technique with therapeutic exercises      Plan: Pt will do HEP and follow up as needed. Precautions: Falls, Dm2, COPD, HTN Osteoporosis, Anemia Hx, Fibromyalgia Hx, R knee Sx Arthroscopic >5 years, Hx of Skin CA. Manuals 9/26 09/29 10/2 10/9 10/12 10/20 10/24 10/27                                                         Neuro Re-Ed             Education and progression 10 Min 5 min  5 min          NBOS EC  1 min 1 min  1 min 1 min 1 min 1 min      NBOS EO Foam  1 min 1 min  1 min 1 min  1 min 1 min      Side Steps  4 laps 4 laps 4 laps 4 laps 4 laps 4 laps 4 laps     AP PA Walking  4 laps  4 laps  4 laps 4 laps 4 laps 4 laps      Cone Step over                          Ther Ex             Semi loaded knee ext D B 4 x 10 4x10  4x10 4x10 4 x 10 4x10 4x10  2x10      Progression? Seated unloaded ext    2x10 (HEP) 2 x 10 2x10 2x10 2x10                  HR/TR  2x10 2x10 2x10 ea 2 x 10  2x10 2x10 2x10      Hip Abd + Ext  2x10  2x10 2x10 ea 2 x 10 2x10 2x10  2x10                               Ther Activity             Knee Ext ROM B, achieved TKE            Walking B            Other Concerns             squatting     Nv? Sit<>Stands     Nv?  With foam.  2x10 w/ foam 2x10 foam 2x10 foam      Modalities             CP/HP PRN

## 2023-10-27 NOTE — QUICK NOTE
Spoke with patient's  and granddaughter Henry Bonilla [in person] and Fransisca Gross phone])  and updated them on the patient's current condition, care management plan, and goals. All questions were answered to their satisfaction.

## 2023-10-27 NOTE — ASSESSMENT & PLAN NOTE
67yo F w/ a PMH of HTN, HLD, SHAYAN on inspire device (off since not using),  peripheral neuropathy, memory loss, past UTIs where she has had confusion ( notes confusion for the past 1 month), DM, firbomylagia, depression, ambulatory dysfunction(uses walker)  Presented as stroke alert on 10/27/2023  3:10 PM with initial NIHSS of 14 and LKW 2:15pm 10/27/2023 , initial Blood Pressure: (!) 185/92. Initial presenting deficits were headache then progressed to aphasia and being mute. Pt was found to be a thrombolysis candidate within the appropriate time window and without obvious contraindication and TNK was given. Of note patient has a baseline of chronic confusion over the past 1 month but patient was able to appropriately answer questions and follow commands throughout most of the month and including this morning during which time until the event around 2 PM when patient was noted to be having a severe headache.  does note that patient does have some memory issues that have been ongoing  Been getting worked up as patient has seen outpatient neurology earlier in the month and just had an MRI done 10/26/2023 that did not show any acute abnormal findings/atrophy    Post thrombolysis exam 10/30: patient oriented and able to answer questions appropriately but wasn't sure about why she was in hospital   Current Blood Pressure: (!) 186/88, BP over last 24 hours: BP  Min: 136/87  Max: 191/91   Vascular risk factors: HTN, HLD, SHAYAN  Home meds: no ac/ap     Workup:  Lab Results   Component Value Date    HGBA1C 7.1 (H) 10/28/2023    CHOLESTEROL 121 10/28/2023    100 [facility] 4 10/28/2023    TRIG 371 (H) 10/28/2023    INR 0.84 10/27/2023      CTH: No mass effect, acute intracranial hemorrhage or evidence of recent infarction.  Mild to moderate chronic microvascular ischemic change   CTA: No evidence for high-grade stenosis, focal occlusion or vascular aneurysm of the cervical or intracranial vessels. : No evidence for high-grade stenosis, focal occlusion or vascular aneurysm of the cervical or intracranial vessels   24 hr CTH s/p TNK: No acute intracranial abnormality. Stable chronic microangiopathic changes within the brain   MRI: There is no discrete mass, mass effect or midline shift. There is no intracranial hemorrhage. Foci of restricted diffusion in right putamen with mild low ADC signal consistent with infarct. Small scattered hyperintensities on T2/FLAIR imaging are noted in the periventricular and subcortical white matter demonstrating an appearance that is statistically most likely to represent moderate microangiopathic change. Repeat CTH at 24 hours: No acute intracranial abnormality. Stable chronic microangiopathic changes within the brain. Echocardiogram: ejection fraction is 55%. Systolic function is normal. Wall motion is normal. Diastolic function is mildly abnormal, consistent with grade I (abnormal) relaxation.  L atrium normal in size  Telemetry: no events      Vit B12 234   10/03/2023 TSH 2.39, free T4 0.92 (T4 WNL)    Pertinent scores:  - NIHSS: 14 (at initial stroke alert)   Stroke Modified [place] Score: 4 (Moderately severe disability; unable to walk and attend to bodily needs without assistance)    Impression: R putamen stroke concern for possible cardio-embolic cause and will do further w/u outpatient w/ zio patch vs Hypertensive stroke from noted pain w/ headache     Plan:  - Stroke pathway  - Discussed plan with neurology attending, Dr. Rodríguez Neither  - normotension for BP  - atorvastatin 40mg qhs  - continue DAPT ASA 81mg and plavix 75mg for 21 days and then ASA 81mg afterwards  - Maintain glucose <180, SSI for coverage if indicated  - Medical management as per primary team appreciated  - recommend zio patch for at least 2 weeks   - repelte vit b12 w/ B12 1000mcg injection daily for 5 days then weekly   - f/u w/ PCP  - DVT ppx held for 24 hours due to thrombolysis administration  - Monitor on telemetry  - PT/OT/Speech/PM&R input appreciated  - Stroke education  - No other further recommendations from the inpatient Neurology perspective. - Please contact Neurology any further questions.

## 2023-10-27 NOTE — PLAN OF CARE
Problem: Neurological Deficit  Goal: Neurological status is stable or improving  Description: Interventions:  - Monitor and assess patient's level of consciousness, motor function, sensory function, and level of assistance needed for ADLs. - Monitor and report changes from baseline. Collaborate with interdisciplinary team to initiate plan and implement interventions as ordered. - Provide and maintain a safe environment. - Consider seizure precautions. - Consider fall precautions. - Consider aspiration precautions. - Consider bleeding precautions.   Outcome: Progressing

## 2023-10-27 NOTE — PLAN OF CARE
0030  Patient more restless and attempting to climb oob. Repetitively asking to go home and go to the bathroom. Bedpan provided, Neuro assessments unchanged, frequent reorientation and repositioning provided. See flow sheets. Problem: Neurological Deficit  Goal: Neurological status is stable or improving  Description: Interventions:  - Monitor and assess patient's level of consciousness, motor function, sensory function, and level of assistance needed for ADLs. - Monitor and report changes from baseline. Collaborate with interdisciplinary team to initiate plan and implement interventions as ordered. - Provide and maintain a safe environment. - Consider seizure precautions. - Consider fall precautions. - Consider aspiration precautions. - Consider bleeding precautions. Outcome: Progressing     Problem: Communication Impairment  Goal: Ability to express needs and understand communication  Description: Assess patient's communication skills and ability to understand information. Patient will demonstrate use of effective communication techniques, alternative methods of communication and understanding even if not able to speak. - Encourage communication and provide alternate methods of communication as needed. - Collaborate with case management/ for discharge needs. - Include patient/family/caregiver in decisions related to communication.   Outcome: Progressing

## 2023-10-27 NOTE — ASSESSMENT & PLAN NOTE
Home medication: Losartan 25 mg once daily, torsemide 5 mg once daily as needed if weight exceeds 172 pounds, metoprolol tartrate 50 mg every 12 hours, amlodipine 5 mg once daily  Cardene drip prn for BP >180 sbp

## 2023-10-27 NOTE — ED PROVIDER NOTES
History  Chief Complaint   Patient presents with    CVA/TIA-like Symptoms     Pt presents EMS as prehospital stroke alert. Slurred speech, unable to follow commands LKW approx 60 minutes ago. CVA/TIA-like Symptoms   Najma Duarte is a 64yoF who comes in as a pre-hospital stroke alert. Per EMS, the  reported onset of headache 1 hour PTA, then 30 minutes later with dysarthria and then aphasia. LKN 2pm. On arrival, patient is awake, alert, initially unable to follow commands though this slightly improves on repeat exams. She is initially aphasic which then improves to one word answers "ow" "no". She is not oriented to self, time, place. Cannot tell us her name. NIHSS 13 for LOC 1, aphasia 2, unable to perform commands 2, unable to properly assess visual fields or facial palsy as she won't perform smile, RUE drift 2, BLE drift 4, unable to assess ataxia, motor response to pain stimuli in all 4, aphasia 2 (won't initially respond to questions), dysarthria 0 (when she does respond later, she says one word answer and is clearly understood). Neuro stroke attending and resident at bedside. Patient taken straight to CT scanner from EMS vehicle. CTH without mass effect, acute intracranial hemorrhage or evidence of recent infarction. Mild to moderate chronic microvascular ischemic change. SBP for EMS in the 190s. Blood Glucose 130s. Neuro Resident Dr. Henry Bonilla called patient's . Patient has had waxing waning bouts of confusion over the last year. She had a UA yesterday that was negative for infection. Because patient is within the window for thrombolytic, Dr. Henry Bonilla did discuss risk/benefits, and  gave consent for administration of TNK. None       No past medical history on file. No past surgical history on file. No family history on file. I have reviewed and agree with the history as documented. No existing history information found. No existing history information found.         Review of Systems   Unable to perform ROS: Patient unresponsive (aphasic)       Physical Exam  ED Triage Vitals   Temperature Pulse Respirations Blood Pressure SpO2   10/27/23 1530 10/27/23 1510 10/27/23 1510 10/27/23 1510 10/27/23 1510   98 °F (36.7 °C) 63 16 (!) 185/92 96 %      Temp Source Heart Rate Source Patient Position - Orthostatic VS BP Location FiO2 (%)   10/27/23 1530 10/27/23 1510 10/27/23 1510 10/27/23 1510 --   Axillary Monitor Sitting Left arm       Pain Score       10/27/23 1510       No Pain             Orthostatic Vital Signs  Vitals:    10/27/23 1510 10/27/23 1515 10/27/23 1530 10/27/23 1600   BP: (!) 185/92 (!) 180/86 (!) 171/83 (!) 181/88   Pulse: 63 66 66 63   Patient Position - Orthostatic VS: Sitting Lying  Lying       Physical Exam  Vitals and nursing note reviewed. Constitutional:       General: She is not in acute distress. Appearance: She is well-developed. She is ill-appearing. HENT:      Head: Normocephalic and atraumatic. Eyes:      Extraocular Movements: Extraocular movements intact. Conjunctiva/sclera: Conjunctivae normal.      Comments: Eyes cross midline   Cardiovascular:      Rate and Rhythm: Normal rate and regular rhythm. Pulses: Normal pulses. Heart sounds: Normal heart sounds. No murmur heard. Pulmonary:      Effort: Pulmonary effort is normal. No respiratory distress. Breath sounds: Normal breath sounds. No wheezing or rales. Abdominal:      Palpations: Abdomen is soft. Tenderness: There is no abdominal tenderness. There is no guarding or rebound. Musculoskeletal:         General: No swelling. Cervical back: Neck supple. Skin:     General: Skin is warm and dry. Capillary Refill: Capillary refill takes less than 2 seconds. Neurological:      Mental Status: She is alert. GCS: GCS eye subscore is 4. GCS verbal subscore is 1. GCS motor subscore is 5.       Cranial Nerves: No dysarthria or facial asymmetry (initially questioned presence of slight right facial droop although unable to fully evaluate as patient was unable to follow command to smile, and on follow up exams, does not appear to have droop). Sensory: Sensation is intact. No sensory deficit. Motor: Weakness (unable to maintain BLE off bed, both drift) present. No atrophy or abnormal muscle tone. Comments: NIHSS 13 for LOC 1, aphasia 2, unable to perform commands 2, unable to properly assess visual fields or facial palsy as she won't perform smile, RUE drift 2, BLE drift 4, unable to assess ataxia, motor response to pain stimuli in all 4, aphasia 2 (won't initially respond to questions), dysarthria 0 (when she does respond later, she says one word answer and is clearly understood). On arrival, patient is awake, alert, initially unable to follow commands though this slightly improves on repeat exams. She is initially aphasic which then improves to one word answers "ow" "no". She is not oriented to self, time, place. Cannot tell us her name. ED Medications  Medications   hydrALAZINE (APRESOLINE) injection 10 mg (has no administration in time range)   iohexol (OMNIPAQUE) 350 MG/ML injection (MULTI-DOSE) 85 mL (85 mL Intravenous Given 10/27/23 1513)   tenecteplase (TNKase) injection 19 mg (19 mg Intravenous Given 10/27/23 1558)       Diagnostic Studies  Results Reviewed       Procedure Component Value Units Date/Time    FLU/RSV/COVID - if FLU/RSV clinically relevant [[id number]]  (Normal) Collected: 10/27/23 1529    Lab Status: Final result Specimen: Nares from Nose Updated: 10/27/23 1620     SARS-CoV-2 Negative     INFLUENZA A PCR Negative     INFLUENZA B PCR Negative     RSV PCR Negative    Narrative:      FOR PEDIATRIC PATIENTS - copy/paste COVID Guidelines URL to browser: [email or web address]    SARS-CoV-2 assay is a Nucleic Acid Amplification assay intended for the  qualitative detection of nucleic acid from SARS-CoV-2 in nasopharyngeal  swabs. Results are for the presumptive identification of SARS-CoV-2 RNA. Positive results are indicative of infection with SARS-CoV-2, the virus  causing COVID-19, but do not rule out bacterial infection or co-infection  with other viruses. Laboratories within the [facility] and its  territories are required to report all positive results to the appropriate  public health authorities. Negative results do not preclude SARS-CoV-2  infection and should not be used as the sole basis for treatment or other  patient management decisions. Negative results must be combined with  clinical observations, patient history, and epidemiological information. This test has not been FDA cleared or approved. This test has been authorized by FDA under an Emergency Use Authorization  (EUA). This test is only authorized for the duration of time the  declaration that circumstances exist justifying the authorization of the  emergency use of an in vitro diagnostic tests for detection of SARS-CoV-2  virus and/or diagnosis of COVID-19 infection under section 564(b)(1) of  the Act, 21 U. S.C. 572NPH-2(K)(1), unless the authorization is terminated  or revoked sooner. The test has been validated but independent review by FDA  and CLIA is pending. Test performed using 5to1 GeneXpert: This RT-PCR assay targets N2,  a region unique to SARS-CoV-2. A conserved region in the E-gene was chosen  for pan-Sarbecovirus detection which includes SARS-CoV-2. According to CMS-2020-01-R, this platform meets the definition of high-throughput technology.     HS Troponin I 4hr [[id number]]     Lab Status: No result Specimen: Blood     Protime-INR [[id number]]  (Normal) Collected: 10/27/23 1515    Lab Status: Final result Specimen: Blood Updated: 10/27/23 1554     Protime 12.1 seconds      INR 0.84    APTT [[id number]]  (Normal) Collected: 10/27/23 1515    Lab Status: Final result Specimen: Blood Updated: 10/27/23 1554     PTT 27 seconds     HS Troponin 0hr (reflex protocol) [[id number]]  (Normal) Collected: 10/27/23 1515    Lab Status: Final result Specimen: Blood Updated: 10/27/23 1542     hs TnI 0hr 9 ng/L     HS Troponin I 2hr [[id number]]     Lab Status: No result Specimen: Blood     Basic metabolic panel [[id number]]  (Abnormal) Collected: 10/27/23 1515    Lab Status: Final result Specimen: Blood Updated: 10/27/23 1537     Sodium 137 mmol/L      Potassium 3.9 mmol/L      Chloride 103 mmol/L      CO2 26 mmol/L      ANION GAP 8 mmol/L      BUN 29 mg/dL      Creatinine 2.33 mg/dL      Glucose 151 mg/dL      Calcium 9.5 mg/dL      eGFR 11 ml/min/1.73sq m     Narrative:      [facility] guidelines for Chronic Kidney Disease (CKD):     Stage 1 with normal or high GFR (GFR > 90 mL/min/1.73 square meters)    Stage 2 Mild CKD (GFR = 60-89 mL/min/1.73 square meters)    Stage 3A Moderate CKD (GFR = 45-59 mL/min/1.73 square meters)    Stage 3B Moderate CKD (GFR = 30-44 mL/min/1.73 square meters)    Stage 4 Severe CKD (GFR = 15-29 mL/min/1.73 square meters)    Stage 5 End Stage CKD (GFR <15 mL/min/1.73 square meters)  Note: GFR calculation is accurate only with a steady state creatinine    UA w Reflex to Microscopic w Reflex to Culture [[id number]]     Lab Status: No result Specimen: Urine     CBC and Platelet [[id number]]  (Abnormal) Collected: 10/27/23 1515    Lab Status: Final result Specimen: Blood Updated: 10/27/23 1518     WBC 11.46 Thousand/uL      RBC 4.22 Million/uL      Hemoglobin 11.9 g/dL      Hematocrit 37.3 %      MCV 88 fL      MCH 28.2 pg      MCHC 31.9 g/dL      RDW 15.9 %      Platelets 356 Thousands/uL      MPV 11.0 fL                    CTA stroke alert (head/neck)   Final Result by Sulma Collins MD (10/27 1536)      No evidence for high-grade stenosis, focal occlusion or vascular aneurysm of the cervical or intracranial vessels               Findings were directly discussed with Elina Juan Manuel  at 3:22 p.m. Workstation performed: [id number]         CT stroke alert brain   Final Result by Jacelyn Boeck, MD (10/27 1532)      No mass effect, acute intracranial hemorrhage or evidence of recent infarction. Mild to moderate chronic microvascular ischemic change. Findings were directly discussed with Elina Zambrano  at 3:22 p.m. Workstation performed: [id number]         CT head wo contrast    (Results Pending)         Procedures  ECG 12 Lead Documentation Only    Date/Time: 10/27/2023 4:16 PM    Performed by: Nirav Granado MD  Authorized by: Nirav Granado MD    Indications / Diagnosis:  Stroke like symptoms  ECG reviewed by me, the ED Provider: yes    Patient location:  ED  Previous ECG:     Previous ECG:  Unavailable  Interpretation:     Interpretation: abnormal    Rate:     ECG rate:  64    ECG rate assessment: normal    Rhythm:     Rhythm: A-V block    Ectopy:     Ectopy: none    QRS:     QRS axis:  Normal    QRS intervals:  Normal  Conduction:     Conduction: abnormal      Abnormal conduction: 1st degree    ST segments:     ST segments:  Normal  T waves:     T waves: normal    Comments:              ED Course  ED Course as of 10/27/23 1621   Fri Oct 27, 2023   1617 CTH IMPRESSION:     No mass effect, acute intracranial hemorrhage or evidence of recent infarction. Mild to moderate chronic microvascular ischemic change.      Findings were directly discussed with Elina Phillips  at 3:22 p.m   1618 CTA head IMPRESSION:     No evidence for high-grade stenosis, focal occlusion or vascular aneurysm of the cervical or intracranial vessels   1618 WBC(!): 11.46   1618 Creatinine(!): 2.33   1618 BUN(!): 29   1618 TNK given at 1600   1618 NIHSS 13 on arrival   1618 Blood Pressure(!): 181/88   1618 Pulse: 63  Given 10mg hydralazine to maintain BP goals <185/110                  Stroke Assessment       Row Name 10/27/23 1500             NIH Stroke Scale Interval Baseline      Level of Consciousness (1a.) 1      LOC Questions (1b.) 2      LOC Commands (1c.) 2      Best Gaze (2.) 0      Visual (3.) 0  unable to properly assess      Facial Palsy (4.) 0      Motor Arm, Left (5a.) 0      Motor Arm, Right (5b.) 2      Motor Leg, Left (6a.) 2      Motor Leg, Right (6b.) 2      Limb Ataxia (7.) 0  unable to properly assess, patient does understand the request to perform      Sensory (8.) 0  withdraws/responds to pain in all 4      Best Language (9.) 2  for the initial 15 minutes, patient is aphasic, though later on she is able to say one word answers      Dysarthria (10.) 0      Extinction and Inattention (11.) (Formerly Neglect) 0  unable to properly assess      Total 13                    Flowsheet Row Most Recent Value   Thrombolytic Decision Options    Thrombolytic Decision After a discussion of risks, benefits and alternatives (including best medical therapy excluding thrombolysis) reviewing inclusion and exclusion criteria the decision was made to proceed with thrombolytic therapy. Consent obtained from acting surrogate decision maker (comment name of person). [Consent obtained by Dr. Guido Pandey via phone]   Surrogate: patient's . Consent obtained by provider: Neurology resident                              Medical Decision Making  Mariah Cole is a 64yoF who comes in as a pre-hospital stroke alert. Per EMS, the  reported onset of headache 1 hour PTA, then 30 minutes later with dysarthria and then aphasia. LKN 2pm. On arrival, patient is awake, alert, initially unable to follow commands though this slightly improves on repeat exams. She is initially aphasic which then improves to one word answers "ow" "no". She is not oriented to self, time, place. Cannot tell us her name.  NIHSS 13 for LOC 1, aphasia 2, unable to perform commands 2, unable to properly assess visual fields or facial palsy as she won't perform smile, RUE drift 2, BLE drift 4, unable to assess ataxia, motor response to pain stimuli in all 4, aphasia 2 (won't initially respond to questions), dysarthria 0 (when she does respond later, she says one word answer and is clearly understood). Neuro stroke attending and resident at bedside. Patient taken straight to CT scanner from EMS vehicle. CTH without mass effect, acute intracranial hemorrhage or evidence of recent infarction. Mild to moderate chronic microvascular ischemic change. SBP for EMS in the 190s. Blood Glucose 130s. TNK given at 1600. Given one dose of hydralazine IV to maintain BP goals after TNK administration. CC attending Dr. Palma Irene notified, will be admitted to ICU for continued close obs with continued stroke workup. Amount and/or Complexity of Data Reviewed  Labs: ordered. Radiology: ordered. Risk  Prescription drug management. Decision regarding hospitalization. Disposition  Final diagnoses:   Aphasia   Weakness of both lower extremities   High blood pressure   Stroke-like symptoms     Time reflects when diagnosis was documented in both MDM as applicable and the Disposition within this note       Time User Action Codes Description Comment    10/27/2023  3:09 PM Maria Alejandra Bidding Add [R47.01] Aphasia     10/27/2023  4:14 PM Sharon Molt Add [R29.898] Weakness of both lower extremities     10/27/2023  4:14 PM Sharon Molt Add [I10] High blood pressure     10/27/2023  4:14 PM Sharon Molt Add [R29.90] Stroke-like symptoms           ED Disposition       ED Disposition   Admit    Condition   Stable    Date/Time   Fri Oct 27, 2023  4:15 PM    Comment   Case was discussed with Dr. Palma Irene and the patient's admission status was agreed to be Admission Status: inpatient status to the service of Dr. Palma Irene . Follow-up Information    None         Patient's Medications    No medications on file     No discharge procedures on file.     PDMP Review       None             ED Provider  Attending physically available and evaluated Jayuyaler Scheuermann. I managed the patient along with the ED Attending.     Electronically Signed by           Carlie Garcia MD  10/27/23 7142

## 2023-10-27 NOTE — ASSESSMENT & PLAN NOTE
Last hemoglobin A1c on 10/3/2023 equals 7.3%    No results for input(s): "POCGLU" in the last 72 hours.     Blood Sugar Average: Last 72 hrs:    Home medication: Insulin detemir 50 units every evening, NovoLog 18, 18, 25  Continue home medication  Sliding scale  Diabetic diet

## 2023-10-28 ENCOUNTER — APPOINTMENT (INPATIENT)
Dept: CT IMAGING | Facility: HOSPITAL | Age: 73
DRG: 062 | End: 2023-10-28
Payer: MEDICARE

## 2023-10-28 LAB
ANION GAP SERPL CALCULATED.3IONS-SCNC: 7 MMOL/L
BASOPHILS # BLD AUTO: 0.05 THOUSANDS/ÂΜL (ref 0–0.1)
BASOPHILS NFR BLD AUTO: 0 % (ref 0–1)
BUN SERPL-MCNC: 24 MG/DL (ref 5–25)
CALCIUM SERPL-MCNC: 8.8 MG/DL (ref 8.4–10.2)
CHLORIDE SERPL-SCNC: 105 MMOL/L (ref 96–108)
CHOLEST SERPL-MCNC: 121 MG/DL
CO2 SERPL-SCNC: 26 MMOL/L (ref 21–32)
CREAT SERPL-MCNC: 2.15 MG/DL (ref 0.6–1.3)
EOSINOPHIL # BLD AUTO: 0.27 THOUSAND/ÂΜL (ref 0–0.61)
EOSINOPHIL NFR BLD AUTO: 2 % (ref 0–6)
ERYTHROCYTE [DISTWIDTH] IN BLOOD BY AUTOMATED COUNT: 15.7 % (ref 11.6–15.1)
EST. AVERAGE GLUCOSE BLD GHB EST-MCNC: 157 MG/DL
GFR SERPL CREATININE-BSD FRML MDRD: 22 ML/MIN/1.73SQ M
GLUCOSE SERPL-MCNC: 103 MG/DL (ref 65–140)
GLUCOSE SERPL-MCNC: 114 MG/DL (ref 65–140)
GLUCOSE SERPL-MCNC: 132 MG/DL (ref 65–140)
GLUCOSE SERPL-MCNC: 146 MG/DL (ref 65–140)
GLUCOSE SERPL-MCNC: 161 MG/DL (ref 65–140)
GLUCOSE SERPL-MCNC: 227 MG/DL (ref 65–140)
HBA1C MFR BLD: 7.1 %
HCT VFR BLD AUTO: 35.9 % (ref 34.8–46.1)
HDLC SERPL-MCNC: 43 MG/DL
HGB BLD-MCNC: 11.6 G/DL (ref 11.5–15.4)
IMM GRANULOCYTES # BLD AUTO: 0.05 THOUSAND/UL (ref 0–0.2)
IMM GRANULOCYTES NFR BLD AUTO: 0 % (ref 0–2)
LDLC SERPL CALC-MCNC: 4 MG/DL (ref 0–100)
LYMPHOCYTES # BLD AUTO: 2.16 THOUSANDS/ÂΜL (ref 0.6–4.47)
LYMPHOCYTES NFR BLD AUTO: 18 % (ref 14–44)
MCH RBC QN AUTO: 28.7 PG (ref 26.8–34.3)
MCHC RBC AUTO-ENTMCNC: 32.3 G/DL (ref 31.4–37.4)
MCV RBC AUTO: 89 FL (ref 82–98)
MONOCYTES # BLD AUTO: 0.68 THOUSAND/ÂΜL (ref 0.17–1.22)
MONOCYTES NFR BLD AUTO: 6 % (ref 4–12)
NEUTROPHILS # BLD AUTO: 8.6 THOUSANDS/ÂΜL (ref 1.85–7.62)
NEUTS SEG NFR BLD AUTO: 74 % (ref 43–75)
NRBC BLD AUTO-RTO: 0 /100 WBCS
PLATELET # BLD AUTO: 262 THOUSANDS/UL (ref 149–390)
PMV BLD AUTO: 11 FL (ref 8.9–12.7)
POTASSIUM SERPL-SCNC: 3.3 MMOL/L (ref 3.5–5.3)
RBC # BLD AUTO: 4.04 MILLION/UL (ref 3.81–5.12)
SODIUM SERPL-SCNC: 138 MMOL/L (ref 135–147)
TRIGL SERPL-MCNC: 371 MG/DL
WBC # BLD AUTO: 11.81 THOUSAND/UL (ref 4.31–10.16)

## 2023-10-28 PROCEDURE — 99232 SBSQ HOSP IP/OBS MODERATE 35: CPT | Performed by: INTERNAL MEDICINE

## 2023-10-28 PROCEDURE — 82948 REAGENT STRIP/BLOOD GLUCOSE: CPT

## 2023-10-28 PROCEDURE — 80061 LIPID PANEL: CPT | Performed by: INTERNAL MEDICINE

## 2023-10-28 PROCEDURE — G1004 CDSM NDSC: HCPCS

## 2023-10-28 PROCEDURE — 83036 HEMOGLOBIN GLYCOSYLATED A1C: CPT | Performed by: INTERNAL MEDICINE

## 2023-10-28 PROCEDURE — 99233 SBSQ HOSP IP/OBS HIGH 50: CPT | Performed by: PSYCHIATRY & NEUROLOGY

## 2023-10-28 PROCEDURE — 92610 EVALUATE SWALLOWING FUNCTION: CPT

## 2023-10-28 PROCEDURE — 70450 CT HEAD/BRAIN W/O DYE: CPT

## 2023-10-28 PROCEDURE — C9113 INJ PANTOPRAZOLE SODIUM, VIA: HCPCS

## 2023-10-28 PROCEDURE — 80048 BASIC METABOLIC PNL TOTAL CA: CPT | Performed by: INTERNAL MEDICINE

## 2023-10-28 PROCEDURE — 85025 COMPLETE CBC W/AUTO DIFF WBC: CPT | Performed by: INTERNAL MEDICINE

## 2023-10-28 RX ORDER — HEPARIN SODIUM 5000 [USP'U]/ML
5000 INJECTION, SOLUTION INTRAVENOUS; SUBCUTANEOUS EVERY 8 HOURS SCHEDULED
Status: DISCONTINUED | OUTPATIENT
Start: 2023-10-29 | End: 2023-10-30 | Stop reason: HOSPADM

## 2023-10-28 RX ORDER — LOSARTAN POTASSIUM 25 MG/1
25 TABLET ORAL DAILY
Status: DISCONTINUED | OUTPATIENT
Start: 2023-10-28 | End: 2023-10-30 | Stop reason: HOSPADM

## 2023-10-28 RX ORDER — AMLODIPINE BESYLATE 5 MG/1
5 TABLET ORAL DAILY
Status: DISCONTINUED | OUTPATIENT
Start: 2023-10-28 | End: 2023-10-30 | Stop reason: HOSPADM

## 2023-10-28 RX ORDER — INSULIN LISPRO 100 [IU]/ML
15 INJECTION, SOLUTION INTRAVENOUS; SUBCUTANEOUS
Status: DISCONTINUED | OUTPATIENT
Start: 2023-10-28 | End: 2023-10-30 | Stop reason: HOSPADM

## 2023-10-28 RX ORDER — HYDRALAZINE HYDROCHLORIDE 20 MG/ML
5 INJECTION INTRAMUSCULAR; INTRAVENOUS EVERY 6 HOURS PRN
Status: DISCONTINUED | OUTPATIENT
Start: 2023-10-28 | End: 2023-10-30 | Stop reason: HOSPADM

## 2023-10-28 RX ORDER — INSULIN LISPRO 100 [IU]/ML
1-6 INJECTION, SOLUTION INTRAVENOUS; SUBCUTANEOUS
Status: DISCONTINUED | OUTPATIENT
Start: 2023-10-28 | End: 2023-10-30 | Stop reason: HOSPADM

## 2023-10-28 RX ORDER — POTASSIUM CHLORIDE 20 MEQ/1
40 TABLET, EXTENDED RELEASE ORAL ONCE
Status: DISCONTINUED | OUTPATIENT
Start: 2023-10-28 | End: 2023-10-28

## 2023-10-28 RX ORDER — METOPROLOL TARTRATE 50 MG/1
50 TABLET, FILM COATED ORAL EVERY 12 HOURS SCHEDULED
Status: DISCONTINUED | OUTPATIENT
Start: 2023-10-28 | End: 2023-10-30 | Stop reason: HOSPADM

## 2023-10-28 RX ORDER — HYDRALAZINE HYDROCHLORIDE 20 MG/ML
5 INJECTION INTRAMUSCULAR; INTRAVENOUS EVERY 6 HOURS PRN
Status: DISCONTINUED | OUTPATIENT
Start: 2023-10-28 | End: 2023-10-28

## 2023-10-28 RX ORDER — POTASSIUM CHLORIDE 14.9 MG/ML
20 INJECTION INTRAVENOUS
Status: COMPLETED | OUTPATIENT
Start: 2023-10-28 | End: 2023-10-28

## 2023-10-28 RX ADMIN — NICARDIPINE HYDROCHLORIDE 1 MG/HR: 2.5 INJECTION, SOLUTION INTRAVENOUS at 06:53

## 2023-10-28 RX ADMIN — POTASSIUM CHLORIDE 20 MEQ: 14.9 INJECTION, SOLUTION INTRAVENOUS at 11:04

## 2023-10-28 RX ADMIN — PRAMIPEXOLE DIHYDROCHLORIDE 0.25 MG: 0.25 TABLET ORAL at 22:17

## 2023-10-28 RX ADMIN — POTASSIUM CHLORIDE 20 MEQ: 14.9 INJECTION, SOLUTION INTRAVENOUS at 08:06

## 2023-10-28 RX ADMIN — INSULIN DETEMIR 50 UNITS: 100 INJECTION, SOLUTION SUBCUTANEOUS at 22:17

## 2023-10-28 RX ADMIN — PANTOPRAZOLE SODIUM 40 MG: 40 INJECTION, POWDER, FOR SOLUTION INTRAVENOUS at 08:48

## 2023-10-28 RX ADMIN — CEPHALEXIN 250 MG: 250 CAPSULE ORAL at 22:17

## 2023-10-28 RX ADMIN — PRAVASTATIN SODIUM 40 MG: 40 TABLET ORAL at 18:03

## 2023-10-28 RX ADMIN — INSULIN LISPRO 15 UNITS: 100 INJECTION, SOLUTION INTRAVENOUS; SUBCUTANEOUS at 18:03

## 2023-10-28 RX ADMIN — PANTOPRAZOLE SODIUM 40 MG: 40 INJECTION, POWDER, FOR SOLUTION INTRAVENOUS at 18:03

## 2023-10-28 RX ADMIN — LOSARTAN POTASSIUM 25 MG: 25 TABLET, FILM COATED ORAL at 18:16

## 2023-10-28 RX ADMIN — AMLODIPINE BESYLATE 5 MG: 5 TABLET ORAL at 18:16

## 2023-10-28 RX ADMIN — CHLORHEXIDINE GLUCONATE 15 ML: 1.2 SOLUTION ORAL at 08:48

## 2023-10-28 RX ADMIN — GABAPENTIN 100 MG: 100 CAPSULE ORAL at 22:16

## 2023-10-28 RX ADMIN — METOPROLOL TARTRATE 50 MG: 50 TABLET, FILM COATED ORAL at 22:16

## 2023-10-28 RX ADMIN — CHLORHEXIDINE GLUCONATE 15 ML: 1.2 SOLUTION ORAL at 22:17

## 2023-10-28 NOTE — SPEECH THERAPY NOTE
Speech-Language Pathology Bedside Swallow Evaluation        Patient Name: Tesha Mayer    TBPWX'S Date: 10/28/2023     Problem List  Principal Problem:    Stroke-like symptom  Active Problems:    HTN (hypertension)    HLD (hyperlipidemia)    History of Nissen fundoplication    History of cholecystectomy    SHAYAN (obstructive sleep apnea)    History of recurrent UTIs    Dyspnea    CKD (chronic kidney disease) stage 4, GFR 15-29 ml/min ([facility])    Hypothyroidism    Type 2 diabetes mellitus ([address])         Past Medical History  History reviewed. No pertinent past medical history. Past Surgical History  History reviewed. No pertinent surgical history. Summary    Pt presents with normal appearing oral and pharyngeal swallowing skills. No overt s/s aspiration noted. Recommendations:   Diet: regular diet and thin liquids   Meds: whole with liquid   Frequent Oral care: 2x/day  Other Recommendations/ considerations: no dysphagia tx follow up needed. Will complete speech/language eval as able. Current Medical Status  Pt is a 75 y.o. female who presented to [address] [place] [address]  with stroke like symptoms. Pt presents with confusion and slurring of speech. PMHx of hypertension, hyperlipidemia, diabetes mellitus, hypothyroidism and SHAYAN. Last known normal is 2.30pm as per her . History obtained by  as patient was replying in one word answers. Last known normal is 2.30pm. Her  states patient has been confused since past 1 month, and has had recurrent UTIs after which she becomes confused. Today she complained of a severe headache while seated on her couch, not relieved with Tylenol. She started slurring her words and became mute. He helped her to the bathroom as she was stumbling but no weakness specifically noted on either side. At that point he called EMS. Patient was able to walk to the ambulance. Patient denies difficulty sleeping, appetite is normal, no abdominal or supra pubic pain.    GASPER is one hour prior to arrival when she was speaking and just relaxing sitting down and typically ambulates herself per , sudden onset of symptoms with no inciting event     CT H stat not acute  CTAH/N no LVO  Discussed with  benefits and smaller risks including bleeding with TNKtPA.  gave consent for thrombolytics. Past medical history:   Please see H&P for details    Special Studies:  CT-head: 10/27/23 No mass effect, acute intracranial hemorrhage or evidence of recent infarction. Mild to moderate chronic microvascular ischemic change. VBS: 8/12/20  Oral and pharyngeal stages of swallowing appeared WNL  Esophageal dysphagia due to known hx of GERD and diverticulum noted in lower third of esophagus per EGD    Social/Education/Vocational Hx:  Pt lives with family    Swallow Information   Current Risks for Dysphagia & Aspiration: CVA  Current Symptoms/Concerns:  stroke like symptoms. Pt failed nsg screen for facial droop  Current Diet: NPO   Baseline Diet: regular diet and thin liquids  Takes pills- whole w/ water     Baseline Assessment   Behavior/Cognition: alert  Speech/Language Status: able to follow commands inconsistently and expressive aphasia noted  Patient Positioning: upright in bed     Swallow Mechanism Exam   Facial: symmetrical  Labial: WFL  Lingual: WFL  Velum: unable to visualize  Mandible: adequate ROM  Dentition: adequate  Vocal quality:clear/adequate   Volitional Cough: strong/productive   Respiratory: RA    Consistencies Assessed and Performance   Consistencies Administered: thin liquids, nectar thick, puree, and soft solids    Oral Stage: pt able to self feed, bite toast and drink liquids from cup and straw. Mastication/manipulation were WNL. Pt w/ good oral control w/ liquids       Pharyngeal Stage: swallow initiation was timely w/ complete laryngeal excursion. No overt s/s aspiration noted. No c/o food dysphagia symptoms.       Esophageal Concerns: none reported      Results Reviewed with: patient and RN   Dysphagia Goals: none at this time      April Gianfranco Lopez, [address] [facility]  Speech Pathologist  [place] license # [id number]  [place] license # [id number]  Available via [employer]

## 2023-10-28 NOTE — PHYSICAL THERAPY NOTE
Physical Therapy Cancellation Note     10/28/23 1200   Note Type   Note type Cancelled Session   Cancel Reasons Medical status   Additional Comments PT consult received and chart reviewed. Per EMR, pt received TNK 10/27/23 @ 1558. Per PT protcol, will hold PT services 24hrs post TNK. Will f/u next day to complete PT evaluation. MARY GRACE rivera.        Vinay Bocanegra, PT, DPT   Available via [employer]  NPI # [id number]  [place] License - [id number]  [id number]

## 2023-10-28 NOTE — ASSESSMENT & PLAN NOTE
Reported by both family and patient, patient has had multiple episodes of UTIs with cultures notable for the presence of enterococcal cloaca  Patient is currently prescribed Cefelexin for 30 days through 11/11  Urine analysis conducted on 10/27 at time of presentation in the setting of the patient's altered mental status was negative for any signs or symptoms consistent with urinary tract infections on laboratory evaluation.   Plan  - Continue patient's prescribed antibiotic therapy for persistent/recurrent episodes of urinary tract infections

## 2023-10-28 NOTE — CASE MANAGEMENT
Case Management Assessment    Patient name Mary Grace Ramsey  Location ICU 05/ICU 05 MRN [id number]  : 1950 Date 10/28/2023       Current Admission Date: 10/27/2023  Current Admission Diagnosis:Stroke-like symptom   Patient Active Problem List    Diagnosis Date Noted    Stroke-like symptom 10/27/2023    HTN (hypertension) 10/27/2023    HLD (hyperlipidemia) 10/27/2023    History of Nissen fundoplication     History of cholecystectomy 10/27/2023    SHAYAN (obstructive sleep apnea) 10/27/2023    History of recurrent UTIs 10/27/2023    Dyspnea 10/27/2023    CKD (chronic kidney disease) stage 4, GFR 15-29 ml/min ([address]) 10/27/2023    Hypothyroidism 10/27/2023    Type 2 diabetes mellitus ([address]) 10/27/2023      LOS (days): 1  Geometric Mean LOS (GMLOS) (days):   Days to GMLOS:     OBJECTIVE:    Risk of Unplanned Readmission Score: 9.94         Current admission status: Inpatient       Preferred Pharmacy:   [facility]/pharmacy [address] [place] [address]  [address] [place] [address]  Phone: [phone] Fax: [phone]    Primary Care Provider: No primary care provider on file. Primary Insurance: MEDICARE  Secondary Insurance:     ASSESSMENT:  Active Health Care Proxies    There are no active Health Care Proxies on file. Patient Information  Admitted from[de-identified] Home  Mental Status: Alert  During Assessment patient was accompanied by: Spouse  Primary Caregiver: Spouse  Caregiver's Name[de-identified] Holli Cruz (Spouse)  Caregiver's Relationship to Patient[de-identified] Family Member  Caregiver's Telephone Number[de-identified] [phone]  Support Systems: Spouse/significant other  [place] of Residence: [address] do you live in?: [facility] entry access options.  Select all that apply.: No steps to enter home  Type of Current Residence: 2 story home  Upon entering residence, is there a bedroom on the main floor (no further steps)?: Yes  Upon entering residence, is there a bathroom on the main floor (no further steps)?: Yes  In the last 12 months, was there a time when you were not able to pay the mortgage or rent on time?: No  In the last 12 months, was there a time when you did not have a steady place to sleep or slept in a shelter (including now)?: No  Homeless/housing insecurity resource given?: N/A  Living Arrangements: Lives w/ Spouse/significant other, Lives w/ Son    Activities of Daily Living Prior to Admission  Functional Status: Independent  Completes ADLs independently?: Yes  Ambulates independently?: Yes  Does patient use assisted devices?: Yes  Assisted Devices (DME) used: Straight Cane, Rollator  Does patient currently own DME?: Yes  What DME does the patient currently own?: Straight Cane, Rollator  Does patient have a history of Outpatient Therapy (PT/OT)?: Yes  Does the patient have a history of Short-Term Rehab?: Yes  Does patient have a history of HHC?: Yes  Does patient currently have [address]?: No         Patient Information Continued  Within the past 12 months, you worried that your food would run out before you got the money to buy more.: Never true  Within the past 12 months, the food you bought just didn't last and you didn't have money to get more.: Never true  Food insecurity resource given?: N/A         Means of Transportation  Means of Transport to Appts[de-identified] Family transport  In the past 12 months, has lack of transportation kept you from medical appointments or from getting medications?: No  In the past 12 months, has lack of transportation kept you from meetings, work, or from getting things needed for daily living?: No  Was application for public transport provided?: N/A    CM met with patient and pt's spouse at bedside re: cosalexis received for dispo planning. Patient lives with spouse and son in two story home, half step to enter home with [facility]. Patient receives assistance as needed from  for ADLS and utilizes her cane in home and rollator in community.  Patient has hx of HHC/STR/OPPT and spouse provides transport to appointments. Confirmed PCP ([facility]) and preferred pharmacy ([facility]). PT/OT eval pending. No CM d/c needs currently identified, CM to f/u with pt and spouse pending PT/OT recs.

## 2023-10-28 NOTE — ASSESSMENT & PLAN NOTE
Patient's home medication regimen is as follows: Losartan 25 mg once daily  Torsemide 5 mg once daily as needed if weight exceeds 172 pounds   Metoprolol tartrate 50 mg every 12 hours  Amlodipine 5 mg once daily  Secondary to the patient's strokelike symptoms as well as presentation to the ICU in the setting of TNK administration, patient has blood pressure goals as mentioned in the "strokelike symptoms" section of this note. Patient is currently on and ordered Cardene drip for blood pressure control less than 832 systolic blood pressure. Plan   -Utilize Cardene drip as needed for blood pressure control, wean as tolerated   -When Cardene drip can be discontinued, consider restarting and reinitiating patient's home medications.   -Continue with blood pressure goals of less than 095 systolic in the setting of potential strokelike pathology.

## 2023-10-28 NOTE — ASSESSMENT & PLAN NOTE
- Review of electronic medical record, patient is noted to have a past medical history significant for cholecystectomy.  -Based on my abdominal examination as well as previous evaluation of the patient, I do not appreciate any abdominal tenderness at this time.  -Plan   -Repeat and serial abdominal examinations during evaluation in the morning to assess for any potential abdominal pathology. -If worsening bowel output or increased abdominal tenderness, consider imaging of the abdomen in the setting of past surgical history.

## 2023-10-28 NOTE — ASSESSMENT & PLAN NOTE
Lab Results   Component Value Date    EGFR 11 10/27/2023    CREATININE 2.33 (H) 10/27/2023     On review of electronic medical record, patient's baseline is appreciated to range between 2-2.16  Plan   - Continue to monitor and trend laboratory functions with morning laboratory studies  -If worsening kidney function despite trending and fluid resuscitation, consider consultation with nephrology colleagues.

## 2023-10-28 NOTE — ASSESSMENT & PLAN NOTE
No results found for: "[facility]", "HDL", "CHOLESTEROL", "HGBA1C"  Patient presented to the [facility] emergency department as a stroke alert with last known well around 2:15 PM 10/2723. Patient was administered TNK at approximately 3:58 PM on 7/27/2023 secondary to concerns by neurology team's based on the patient's physical examination. Baseline: Gestational and able to follow commands, waxing and waning confusion. Noted to have severe headache and difficulty forming words then became completely mute     CT head:No mass effect, acute intracranial hemorrhage or evidence of recent infarction. Mild to moderate chronic microvascular ischemic change. CTA: No evidence for high-grade stenosis, focal occlusion or vascular aneurysm of the cervical or intracranial vessels  MRI: Pending  Echocardiogram: Pending  Telemetry: Will order     Plan:  Neuro onboard  Tenecteplace given 3: 58 pm  Repeat CT head after 24 hours  No AP/AC for at least 24 hours s/p TNKtPA  STAT CT Head  if worsening of symptoms  BP goal less than 180/105. MRI brain routine (inspire device is already turned off)- pt has other chart I reviewed this in PACS and MRI brain neuroquant yesterday with no acute pathology  Other infectious metabolic work up as warrnated per primary team  Admit to ICU for close monitoring, stroke protocol.  /105 or lesser as s/p TNKtPA  Placed on stroke pathway  Telemetry  MRI  ECHO  DVT prophylaxis with enoxaparin  speech consult, PT, OT

## 2023-10-28 NOTE — ASSESSMENT & PLAN NOTE
Patient has a documented history of hypothyroidism, on medication in the outpatient setting  Patient utilizes a dosage of 125 mcg once daily  Plan   -Continue home medication regimen as tolerated.

## 2023-10-28 NOTE — ASSESSMENT & PLAN NOTE
Patient has a previously documented history of hyperlipidemia. Patient is prescribed to utilize rosuvastatin 5 mg daily at home  Plan  -We will plan to hold for now  -Consider restarting this medication after negative imaging studies and other assessments for shocklike symptoms as mentioned above.

## 2023-10-28 NOTE — ASSESSMENT & PLAN NOTE
- Previous PFTs on 8/23/2022 FEV1/FVC ratio of 81%, FVC 84%, FEV1 86%, DLCO 57%. Normal spirometry. moderately decreased diffusion capacity  Plan  - Albuterol once daily  - SPO2 goal above 92%. - Continuous pulse oximetry for continued evaluation of patient's oxygenation status.  -Patient is currently on room air, consider supplemental oxygen via nasal cannula if any worsening in oxygenation status is appreciated on pulse oximetry.

## 2023-10-28 NOTE — SEPSIS NOTE
Sepsis Note   Mary Grace Ramsey 75 y.o. female MRN: [id number]  Unit/Bed#: ICU 05 Encounter: [id number]       Initial Sepsis Screening       Row Name 10/28/23 0434                Is the patient's history suggestive of a new or worsening infection? No  -TL                  User Key  (r) = Recorded By, (t) = Taken By, (c) = Cosigned By      [facility] Name Provider Type    TL RAMIRO Tong Nurse Practitioner                  Sepsis time zero alerted at 0430 on 10/28 for HR 95, RR 36 and elevated sCr 2.15.  - HR elevated during repositioning/AM labs  - RR erroneous reading on exam RR 16, easy unlabored  - sCr 2.15 improved from previous of 2.33 with a history of CKD, baseline sCr 2.0 to 2.1, pt is not at baseline.   - no evidence of infection    Body mass index is 28.99 kg/m².   Wt Readings from Last 1 Encounters:   10/28/23 76.6 kg (168 lb 14 oz)     IBW (Ideal Body Weight): 54.7 kg    Ideal body weight: 54.7 kg (120 lb 9.5 oz)  Adjusted ideal body weight: 63.5 kg (139 lb 14.5 oz)

## 2023-10-28 NOTE — PLAN OF CARE
Problem: MOBILITY - ADULT  Goal: Maintain or return to baseline ADL function  Description: INTERVENTIONS:  -  Assess patient's ability to carry out ADLs; assess patient's baseline for ADL function and identify physical deficits which impact ability to perform ADLs (bathing, care of mouth/teeth, toileting, grooming, dressing, etc.)  - Assess/evaluate cause of self-care deficits   - Assess range of motion  - Assess patient's mobility; develop plan if impaired  - Assess patient's need for assistive devices and provide as appropriate  - Encourage maximum independence but intervene and supervise when necessary  - Involve family in performance of ADLs  - Assess for home care needs following discharge   - Consider OT consult to assist with ADL evaluation and planning for discharge  - Provide patient education as appropriate  Outcome: Progressing  Goal: Maintains/Returns to pre admission functional level  Description: INTERVENTIONS:  - Perform BMAT or MOVE assessment daily.   - Set and communicate daily mobility goal to care team and patient/family/caregiver. - Collaborate with rehabilitation services on mobility goals if consulted  - Out of bed to chair 3 times a day   - Out of bed for meals 3 times a day  - Out of bed for toileting  - Record patient progress and toleration of activity level   Outcome: Progressing     Problem: Neurological Deficit  Goal: Neurological status is stable or improving  Description: Interventions:  - Monitor and assess patient's level of consciousness, motor function, sensory function, and level of assistance needed for ADLs. - Monitor and report changes from baseline. Collaborate with interdisciplinary team to initiate plan and implement interventions as ordered. - Provide and maintain a safe environment. - Consider seizure precautions. - Consider fall precautions. - Consider aspiration precautions. - Consider bleeding precautions.   Outcome: Progressing     Problem: Communication Impairment  Goal: Ability to express needs and understand communication  Description: Assess patient's communication skills and ability to understand information. Patient will demonstrate use of effective communication techniques, alternative methods of communication and understanding even if not able to speak. - Encourage communication and provide alternate methods of communication as needed. - Collaborate with case management/ for discharge needs. - Include patient/family/caregiver in decisions related to communication. Outcome: Progressing     Problem: Prexisting or High Potential for Compromised Skin Integrity  Goal: Skin integrity is maintained or improved  Description: INTERVENTIONS:  - Identify patients at risk for skin breakdown  - Assess and monitor skin integrity  - Assess and monitor nutrition and hydration status  - Monitor labs   - Assess for incontinence   - Turn and reposition patient  - Assist with mobility/ambulation  - Relieve pressure over bony prominences  - Avoid friction and shearing  - Provide appropriate hygiene as needed including keeping skin clean and dry  - Evaluate need for skin moisturizer/barrier cream  - Collaborate with interdisciplinary team   - Patient/family teaching  - Consider wound care consult   Outcome: Progressing     Problem: Activity Intolerance/Impaired Mobility  Goal: Mobility/activity is maintained at optimum level for patient  Description: Interventions:  - Assess and monitor patient  barriers to mobility and need for assistive/adaptive devices. - Assess patient's emotional response to limitations. - Collaborate with interdisciplinary team and initiate plans and interventions as ordered. - Encourage independent activity per ability.  - Maintain proper body alignment. - Perform active/passive rom as tolerated/ordered.   - Plan activities to conserve energy.  - Turn patient as appropriate  Outcome: Progressing     Problem: Potential for Aspiration  Goal: Non-ventilated patient's risk of aspiration is minimized  Description: Assess and monitor vital signs, respiratory status, and labs (WBC). Monitor for signs of aspiration (tachypnea, cough, rales, wheezing, cyanosis, fever). - Assess and monitor patient's ability to swallow. - Place patient up in chair to eat if possible. - HOB up at 90 degrees to eat if unable to get patient up into chair.  - Supervise patient during oral intake. - Instruct patient/ family to take small bites. - Instruct patient/ family to take small single sips when taking liquids. - Follow patient-specific strategies generated by speech pathologist.  Outcome: Progressing  Goal: Ventilated patient's risk of aspiration is minimized  Description: Assess and monitor vital signs, respiratory status, airway cuff pressure, and labs (WBC). Monitor for signs of aspiration (tachypnea, cough, rales, wheezing, cyanosis, fever). - Elevate head of bed 30 degrees if patient has tube feeding.  - Monitor tube feeding. Outcome: Progressing     Problem: Nutrition  Goal: Nutrition/Hydration status is improving  Description: Monitor and assess patient's nutrition/hydration status for malnutrition (ex- brittle hair, bruises, dry skin, pale skin and conjunctiva, muscle wasting, smooth red tongue, and disorientation). Collaborate with interdisciplinary team and initiate plan and interventions as ordered. Monitor patient's weight and dietary intake as ordered or per policy. Utilize nutrition screening tool and intervene per policy. Determine patient's food preferences and provide high-protein, high-caloric foods as appropriate. - Assist patient with eating.  - Allow adequate time for meals.  - Encourage patient to take dietary supplement as ordered. - Collaborate with clinical nutritionist.  - Include patient/family/caregiver in decisions related to nutrition.   Outcome: Progressing

## 2023-10-28 NOTE — PROGRESS NOTES
NEUROLOGY RESIDENCY PROGRESS NOTE     Name: Shant Hayes   Age & Sex: 75 y.o. female   MRN: [id number]  Unit/Bed#: ICU 05   Encounter: [id number]    Recommendations for outpatient neurological follow up have yet to be determined. This should be completed prior to removing patient from list or discharge     ASSESSMENT & PLAN     * Stroke-like symptom  Assessment & Plan  65yo F w/ a PMH of HTN, HLD, SHAYAN on inspire device (off since not using),  peripheral neuropathy, memory loss, past UTIs where she has had confusion ( notes confusion for the past 1 month), DM, firbomylagia, depression, ambulatory dysfunction(uses walker)  Presented as stroke alert on 10/27/2023  3:10 PM with initial NIHSS of 14 and LKW 2:15pm 10/27/2023 , initial Blood Pressure: (!) 185/92. Initial presenting deficits were headache then progressed to aphasia and being mute. Pt was found to be a thrombolysis candidate within the appropriate time window and without obvious contraindication and TNK was given. Of note patient has a baseline of chronic confusion over the past 1 month but patient was able to appropriately answer questions and follow commands throughout most of the month and including this morning during which time until the event around 2 PM when patient was noted to be having a severe headache.    does note that patient does have some memory issues that have been ongoing  Been getting worked up as patient has seen outpatient neurology earlier in the month and just had an MRI done 10/26/2023 that did not show any acute abnormal findings/atrophy    Post thrombolysis exam 10/28: Still a bit encephalopathic, patient able to life all extremities anti-gravity but not able to follow commands consistent for a extended period of time, she is very happy and laughing and perseverates, she is not always able to answer questions appropriately but was able to state name her  is what typically left if she cannot get the correct answer to her question being asked to her such as where she was or what the date was. She had difficulty distinguishing between temperature between her bilateral lower extremities. Current Blood Pressure: (!) 186/88, BP over last 24 hours: BP  Min: 136/87  Max: 191/91   Vascular risk factors: HTN, HLD, SHAYAN  Home meds: no ac/ap     Workup:  Lab Results   Component Value Date    HGBA1C 7.1 (H) 10/28/2023    CHOLESTEROL 121 10/28/2023    LDLCALC 4 10/28/2023    TRIG 371 (H) 10/28/2023    INR 0.84 10/27/2023      CTH: No mass effect, acute intracranial hemorrhage or evidence of recent infarction.  Mild to moderate chronic microvascular ischemic change   CTA: No evidence for high-grade stenosis, focal occlusion or vascular aneurysm of the cervical or intracranial vessels. : No evidence for high-grade stenosis, focal occlusion or vascular aneurysm of the cervical or intracranial vessels   MRI: pending,  Repeat CTH at 24 hours: pending   Echocardiogram: pending   Telemetry: no events      Vit B12 234  TSH 2.39, free T4 0.92 (T4 WNL)    Pertinent scores:  - NIHSS: 14 (at initial stroke alert)   Stroke Modified Dewart Score: 4 (Moderately severe disability; unable to walk and attend to bodily needs without assistance)    Impression: Concern for acute stroke given patient's noted aphasia onset but patient has noted bilateral lower extremity weakness which doesn't quite fit a stroke, could also be hypertensive encephalopathy but regardless should undergo stroke w/u vs seizure given patient still has some confusion and more appropriate but not completely back to baseline    Plan:  - Stroke pathway  - Discussed plan with neurology attending, Dr. Amaya Barrow  - BP: BP as close to but <180 SBP due to recent tPA administration  - With thrombolysis admin monitor BP and neuro exam q2 hours once outside 24 hour window since symptoms and CTH and neuro exam is stable   - If SBP is >180, increase frequency of BP measurements and administer antihypertensive agents as needed to maintain at or below 180 SBP  - recommend routine EEG given possible concern for seizures   - atorvastatin 40mg qhs  - Maintain glucose <180, SSI for coverage if indicated  - Repeat CTH if GCS drops 2pts in 1hr or if post thrombolysis if pt is reporting severe headache, acute increase in BP, N/V  - Medical management as per primary team appreciated  - TTE pending  - MRI brain pending until safety office is back on 10/30 to evaluate Inspire device as per MRI policy even if patient already had gotten MRI 2 days ago on 10/26  - DVT ppx held for 24 hours due to thrombolysis administration  - Monitor on telemetry  - PT/OT/Speech/PM&R input appreciated  - Repeat CTH at 24 hours (repeat around 4pm 10/28/2023) after thrombolysis administration prior to starting AP/AC (hold off on AC/AP for now until final read and can reach out to neuro based on final read)   - pending final read as some noted L temporal atrophy and some mild density changes concerning for some mild petechial blood (?)   - Stroke education                SUBJECTIVE     Patient was seen and examined. No acute events overnight. She was able to answer more questions such as stating her husbands name but she kept saying the holy spirit. She got hospital with options. She perseverates on stating the [facility] and makes abnormal sounds. Overnight, patient was able to be redirected but was difficult to convince to stay in bed, patient was requiring a lap belt in order to prevent her from removing herself from the bed. Apparently went back to baseline mental status   patient was able to be redirected but was difficult to convince to stay in bed, patient was requiring a lap belt in order to prevent her from removing herself from the bed. Review of Systems    OBJECTIVE     Patient ID: Vincenzo Price is a 75 y.o. female.     Vitals:    10/28/23 1400 10/28/23 1500 10/28/23 1550 10/28/23 1600   BP: (!) 179/82 (!) 175/83 (!) 185/85 (!) 186/88   BP Location:   Left arm    Pulse: 81 82 89 86   Resp: 16 (!) 25 14 17   Temp:   98.9 °F (37.2 °C)    TempSrc:   Oral    SpO2: 94% 95% 95% 96%   Weight:       Height:          Temperature:   Temp (24hrs), Av.6 °F (37 °C), Min:98 °F (36.7 °C), Max:99.1 °F (37.3 °C)    Temperature: 98.9 °F (37.2 °C)      Physical Exam  Eyes:      Extraocular Movements: EOM normal.      Pupils: Pupils are equal, round, and reactive to light. Neurological:      Motor: Motor strength is normal.     Coordination: Finger-Nose-Finger Test and Heel to [facility] Test normal.      Deep Tendon Reflexes:      Reflex Scores:       Tricep reflexes are 1+ on the right side and 1+ on the left side. Bicep reflexes are 1+ on the right side and 1+ on the left side. Brachioradialis reflexes are 1+ on the right side and 1+ on the left side. Patellar reflexes are 1+ on the right side and 1+ on the left side. Achilles reflexes are 1+ on the right side and 1+ on the left side. Psychiatric:         Speech: Speech normal.          Neurologic Exam     Mental Status   Speech: speech is normal   Able to name object. Abnormal comprehension. She is speaking and able to identify items with options but unable to state it on her own and still quite bit confused and laughing     She was able to identify her  but waxes and wanes      Cranial Nerves     CN II   Visual fields full to confrontation. CN III, IV, VI   Pupils are equal, round, and reactive to light. Extraocular motions are normal.   CN III: no CN III palsy  CN VI: no CN VI palsy  Nystagmus: none     CN V   Facial sensation intact. CN VII   Facial expression full, symmetric. CN VIII   CN VIII normal.     CN IX, X   CN IX normal.   CN X normal.     CN XI   CN XI normal.     CN XII   CN XII normal.     Motor Exam   Right arm pronator drift: absent  Left arm pronator drift: absent    Strength   Strength 5/5 throughout. Sensory Exam   Light touch normal.       Cannot distinguish temp in bilat LLE      Gait, Coordination, and Reflexes     Coordination   Finger to nose coordination: normal  Heel to shin coordination: normal    Tremor   Resting tremor: absent  Intention tremor: absent  Action tremor: absent    Reflexes   Right brachioradialis: 1+  Left brachioradialis: 1+  Right biceps: 1+  Left biceps: 1+  Right triceps: 1+  Left triceps: 1+  Right patellar: 1+  Left patellar: 1+  Right achilles: 1+  Left achilles: 1+  Right plantar: normal  Left plantar: normal           LABORATORY DATA     Labs: I have personally reviewed pertinent reports. and I have personally reviewed pertinent films in PACS  Results from last 7 days   Lab Units 10/28/23  0404 10/27/23  1515   WBC Thousand/uL 11.81* 11.46*   HEMOGLOBIN g/dL 11.6 11.9   HEMATOCRIT % 35.9 37.3   PLATELETS Thousands/uL 262 282   NEUTROS PCT % 74  --    MONOS PCT % 6  --    EOS PCT % 2  --       Results from last 7 days   Lab Units 10/28/23  0404 10/27/23  1515   SODIUM mmol/L 138 137   POTASSIUM mmol/L 3.3* 3.9   CHLORIDE mmol/L 105 103   CO2 mmol/L 26 26   BUN mg/dL 24 29*   CREATININE mg/dL 2.15* 2.33*   CALCIUM mg/dL 8.8 9.5              Results from last 7 days   Lab Units 10/27/23  1515   INR  0.84   PTT seconds 27               IMAGING & DIAGNOSTIC TESTING     Radiology Results: I have personally reviewed pertinent reports. and I have personally reviewed pertinent films in PACS    CTA stroke alert (head/neck)   Final Result by Roge Christiansen MD (10/27 1536)      No evidence for high-grade stenosis, focal occlusion or vascular aneurysm of the cervical or intracranial vessels               Findings were directly discussed with Elina Zambrano  at 3:22 p.m. Workstation performed: [id number]         CT stroke alert brain   Final Result by Roge Christiansen MD (10/27 1532)      No mass effect, acute intracranial hemorrhage or evidence of recent infarction. Mild to moderate chronic microvascular ischemic change. Findings were directly discussed with Elina Zambrano  at 3:22 p.m. Workstation performed: [id number]         CT head wo contrast    (Results Pending)   MRI Inpatient Order    (Results Pending)       Other Diagnostic Testing: I have personally reviewed pertinent reports.    and I have personally reviewed pertinent films in PACS    ACTIVE MEDICATIONS     Current Facility-Administered Medications   Medication Dose Route Frequency    cephalexin (KEFLEX) capsule 250 mg  250 mg Oral HS    chlorhexidine (PERIDEX) 0.12 % oral rinse 15 mL  15 mL Mouth/Throat Q12H ELISHA    cholecalciferol (VITAMIN D3) tablet 1,000 Units  1,000 Units Oral Daily    DULoxetine (CYMBALTA) delayed release capsule 30 mg  30 mg Oral Daily    ferrous sulfate tablet 325 mg  325 mg Oral Daily With Breakfast    gabapentin (NEURONTIN) capsule 100 mg  100 mg Oral HS    insulin detemir (LEVEMIR) subcutaneous injection 50 Units  50 Units Subcutaneous HS    insulin lispro (HumaLOG) 100 units/mL subcutaneous injection 1-6 Units  1-6 Units Subcutaneous TID AC    insulin lispro (HumaLOG) 100 units/mL subcutaneous injection 15 Units  15 Units Subcutaneous TID With Meals    levothyroxine tablet 125 mcg  125 mcg Oral Early Morning    niCARdipine (CARDENE) 25 mg (STANDARD CONCENTRATION) in sodium chloride 0.9% 250 mL  1-15 mg/hr Intravenous Titrated    pantoprazole (PROTONIX) injection 40 mg  40 mg Intravenous BID    pramipexole (MIRAPEX) tablet 0.25 mg  0.25 mg Oral HS    pravastatin (PRAVACHOL) tablet 40 mg  40 mg Oral Daily With Dinner       Prior to Admission medications    Not on File         VTE Pharmacologic Prophylaxis: hold off until [facility] shows no acute bleeds   VTE Mechanical Prophylaxis: sequential compression device    ==  Nestor Madsen DO   [facility] Neurology Residency, PGY-3

## 2023-10-28 NOTE — ASSESSMENT & PLAN NOTE
Previously documented history of Nissen fundoplication and had  thoracostomy to [facility] in 2015  On review of electronic medical record, patient is on pantoprazole 40 mg twice daily  Plan   -Continue with his home regimen as long as patient is tolerating oral therapies.   -Consider transition to IV medication if patient has difficulty with oral medications.

## 2023-10-28 NOTE — QUICK NOTE
.Critical Care Interval Transfer Note:    Patient is a 78-year-old female coming in for aphasia  NIHSS 14  Patient was stroke alert, TNK time 3:58 PM  Repeat CT scan was unremarkable  Patient stable to transfer to floors    Barriers to discharge:   Patient needs MRI and because of inspire for obstructive sleep apnea patient will not be able to get this until the device is inspected for MRI compatibility which would be Monday the earliest.  Neurology on board for stroke  May need to start on antiplatelets and atorvastatin if stroke is positive  Pending PTOT eval  Pending echo  Please order telemetry for floors     Consults: IP CONSULT TO NEUROLOGY  IP CONSULT TO CASE MANAGEMENT  IP CONSULT TO NUTRITION SERVICES    Recommended to review admission imaging for incidental findings and document in discharge navigator: Chart reviewed, no known incidental findings noted at this time. Discharge Plan: Anticipate discharge in >72 hrs to Pending rehab eval    Patient seen and evaluated by Critical Care today and deemed to be appropriate for transfer to Med Surg. Spoke to Dr. Daryle Bamberger from [facility] to accept transfer. Critical care can be contacted via [employer] with any questions or concerns.

## 2023-10-28 NOTE — PROGRESS NOTES
[address] [facility]  Progress Note  Name: Gulshan Borrero I  MRN: [id number]  Unit/Bed#: ICU 05 I Date of Admission: 10/27/2023   Date of Service: 10/28/2023 I Hospital Day: 1    Assessment/Plan   * Stroke-like symptom  Assessment & Plan  No results found for: "[facility]", "HDL", "CHOLESTEROL", "HGBA1C"  Patient presented to the [facility] emergency department as a stroke alert with last known well around 2:15 PM 10/2723. Patient was administered TNK at approximately 3:58 PM on 7/27/2023 secondary to concerns by neurology team's based on the patient's physical examination. Baseline: Gestational and able to follow commands, waxing and waning confusion. Noted to have severe headache and difficulty forming words then became completely mute     CT head:No mass effect, acute intracranial hemorrhage or evidence of recent infarction. Mild to moderate chronic microvascular ischemic change. CTA: No evidence for high-grade stenosis, focal occlusion or vascular aneurysm of the cervical or intracranial vessels  MRI: Pending  Echocardiogram: Pending  Telemetry: Will order     Plan:  Neuro onboard  Tenecteplace given 3: 58 pm  Repeat CT head after 24 hours  No AP/AC for at least 24 hours s/p TNKtPA  STAT CT Head  if worsening of symptoms  BP goal less than 180/105. MRI brain routine (inspire device is already turned off)- pt has other chart I reviewed this in PACS and MRI brain neuroquant yesterday with no acute pathology  Other infectious metabolic work up as warrnated per primary team  Admit to ICU for close monitoring, stroke protocol.  /105 or lesser as s/p TNKtPA  Placed on stroke pathway  Telemetry  MRI  ECHO  DVT prophylaxis with enoxaparin  speech consult, PT, OT      Type 2 diabetes mellitus ([address])  Assessment & Plan  Last hemoglobin A1c on 10/3/2023 equals 7.3%    Recent Labs     10/27/23  1812 10/27/23  2351   POCGLU 132 167*       Blood Sugar Average: Last 72 hrs:  (P) 149.5  Home medication: Insulin detemir 50 units every evening, NovoLog 18, 18, 25  Continue home medication  Sliding scale  Diabetic diet    Hypothyroidism  Assessment & Plan  Patient has a documented history of hypothyroidism, on medication in the outpatient setting  Patient utilizes a dosage of 125 mcg once daily  Plan   -Continue home medication regimen as tolerated. CKD (chronic kidney disease) stage 4, GFR 15-29 ml/min [facility])  Assessment & Plan  Lab Results   Component Value Date    EGFR 11 10/27/2023    CREATININE 2.33 (H) 10/27/2023     On review of electronic medical record, patient's baseline is appreciated to range between 2-2.16  Plan   - Continue to monitor and trend laboratory functions with morning laboratory studies  -If worsening kidney function despite trending and fluid resuscitation, consider consultation with nephrology colleagues. Dyspnea  Assessment & Plan  - Previous PFTs on 8/23/2022 FEV1/FVC ratio of 81%, FVC 84%, FEV1 86%, DLCO 57%. Normal spirometry. moderately decreased diffusion capacity  Plan  - Albuterol once daily  - SPO2 goal above 92%. - Continuous pulse oximetry for continued evaluation of patient's oxygenation status.  -Patient is currently on room air, consider supplemental oxygen via nasal cannula if any worsening in oxygenation status is appreciated on pulse oximetry. History of recurrent UTIs  Assessment & Plan  Reported by both family and patient, patient has had multiple episodes of UTIs with cultures notable for the presence of enterococcal cloaca  Patient is currently prescribed Cefelexin for 30 days through 11/11  Urine analysis conducted on 10/27 at time of presentation in the setting of the patient's altered mental status was negative for any signs or symptoms consistent with urinary tract infections on laboratory evaluation.   Plan  - Continue patient's prescribed antibiotic therapy for persistent/recurrent episodes of urinary tract infections        SHAYAN (obstructive sleep apnea)  Assessment & Plan  Patient had inspire on 11/10/2021 however patient is not compliant with this  Plan   -Continue trending oxygenation values in the intensive care unit   -SPO2 greater than 92%   -Supplemental oxygen as tolerated   -Patient is having difficulty with breathing overnight, consider utilization of CPAP to help with breathing while sleeping. History of cholecystectomy  Assessment & Plan  - Review of electronic medical record, patient is noted to have a past medical history significant for cholecystectomy.  -Based on my abdominal examination as well as previous evaluation of the patient, I do not appreciate any abdominal tenderness at this time.  -Plan   -Repeat and serial abdominal examinations during evaluation in the morning to assess for any potential abdominal pathology. -If worsening bowel output or increased abdominal tenderness, consider imaging of the abdomen in the setting of past surgical history. History of Nissen fundoplication  Assessment & Plan  Previously documented history of Nissen fundoplication and had  thoracostomy to [facility] in 2015  On review of electronic medical record, patient is on pantoprazole 40 mg twice daily  Plan   -Continue with his home regimen as long as patient is tolerating oral therapies.   -Consider transition to IV medication if patient has difficulty with oral medications. HLD (hyperlipidemia)  Assessment & Plan  Patient has a previously documented history of hyperlipidemia. Patient is prescribed to utilize rosuvastatin 5 mg daily at home  Plan  -We will plan to hold for now  -Consider restarting this medication after negative imaging studies and other assessments for shocklike symptoms as mentioned above. HTN (hypertension)  Assessment & Plan  Patient's home medication regimen is as follows:    Losartan 25 mg once daily  Torsemide 5 mg once daily as needed if weight exceeds 172 pounds   Metoprolol tartrate 50 mg every 12 hours  Amlodipine 5 mg once daily  Secondary to the patient's strokelike symptoms as well as presentation to the ICU in the setting of TNK administration, patient has blood pressure goals as mentioned in the "strokelike symptoms" section of this note. Patient is currently on and ordered Cardene drip for blood pressure control less than 980 systolic blood pressure. Plan   -Utilize Cardene drip as needed for blood pressure control, wean as tolerated   -When Cardene drip can be discontinued, consider restarting and reinitiating patient's home medications.   -Continue with blood pressure goals of less than 566 systolic in the setting of potential strokelike pathology. ICU Core Measures     A: Assess, Prevent, and Manage Pain Has pain been assessed? Yes  Need for changes to pain regimen? No   B: Both SAT/SAT  N/A   C: Choice of Sedation RASS Goal: 0 Alert and Calm  Need for changes to sedation or analgesia regimen? No   D: Delirium CAM-ICU: Negative   E: Early Mobility  Plan for early mobility? No   F: Family Engagement Plan for family engagement today? Yes       Antibiotic Review: Patient on appropriate coverage based on culture data. and Awaiting culture results. Prophylaxis:  VTE Contraindicated secondary to: Concern for potential intracranial bleed, utilization of TNK, plan to resume after 24 hours of monitoring. Stress Ulcer  covered bypantoprazole (PROTONIX) injection 40 mg [[id number]]       Subjective   HPI/24hr events: Patient reports no acute complaints overnight and appears to be at baseline mental status from my initial evaluation from her in the ICU/presentation to the ICU. No acute mental status changes or deterioration appreciated on my examinations. Update with regards to family (spouse and granddaughter) was conducted at the bedside prior to family leaving for the evening.   Patient did endorse a mild headache that was consistent with tension pathology and had no changes in her neurological assessment overnight. Patient on my repeat interview this morning as well as examination does not offer any acute complaints. Anticipated plan for this patient is for repeat imaging including CT of the head as well as MRI imaging later in the day on 10/28 for further evaluation in concordance with hospital protocol of stroke pathway. Overnight, patient was able to be redirected but was difficult to convince to stay in bed, patient was requiring a lap belt in order to prevent her from removing herself from the bed. Review of Systems   Constitutional:  Negative for chills and fever. HENT:  Negative for ear pain and sore throat. Eyes:  Negative for pain and visual disturbance. Respiratory:  Negative for cough and shortness of breath. Cardiovascular:  Negative for chest pain and palpitations. Gastrointestinal:  Negative for abdominal pain and vomiting. Genitourinary:  Negative for dysuria and hematuria. Musculoskeletal:  Negative for arthralgias and back pain. Skin:  Negative for color change and rash. Neurological:  Negative for seizures and syncope. All other systems reviewed and are negative. Objective                            Vitals I/O      Most Recent Min/Max in 24hrs   Temp 98.3 °F (36.8 °C) Temp  Min: 98 °F (36.7 °C)  Max: 98.3 °F (36.8 °C)   Pulse 80 Pulse  Min: 62  Max: 89   Resp 16 Resp  Min: 12  Max: 31   /79 BP  Min: 137/65  Max: 191/91   O2 Sat 92 % SpO2  Min: 92 %  Max: 97 %      Intake/Output Summary (Last 24 hours) at 10/28/2023 0129  Last data filed at 10/27/2023 2001  Gross per 24 hour   Intake 48.33 ml   Output 250 ml   Net -201.67 ml         Diet NPO     Invasive Monitoring Physical exam    Physical Exam  Eyes:      General: Vision grossly intact. Neglect     Extraocular Movements: Extraocular movements intact. Conjunctiva/sclera: Conjunctivae normal.      Pupils: Pupils are equal, round, and reactive to light. Skin:     General: Skin is warm.       Capillary Refill: Capillary refill takes less than 2 seconds. Findings: No lesion or rash. HENT:      Head: Normocephalic and atraumatic. Right Ear: Hearing normal.      Left Ear: Hearing normal.      Nose: No nasal deformity, congestion or rhinorrhea. Mouth/Throat:      Mouth: Mucous membranes are moist.      Pharynx: No oropharyngeal exudate. Cardiovascular:      Rate and Rhythm: Normal rate and regular rhythm. Pulses: Normal pulses. Heart sounds: Normal heart sounds. Musculoskeletal:         General: No swelling or tenderness. Right lower leg: No edema. Left lower leg: No edema. Abdominal:      Palpations: Abdomen is soft. Tenderness: There is no abdominal tenderness. Constitutional:       Appearance: She is well-developed and well-nourished. She is not toxic-appearing. Interventions: She is not sedated, not intubated and not restrained. Pulmonary:      Effort: Pulmonary effort is normal. No accessory muscle usage, respiratory distress or accessory muscle usage. She is not intubated. Breath sounds: No wheezing, rhonchi or rales. Psychiatric:         Speech: Speech is expressive aphasia. Neurological:      Cranial Nerves: No facial asymmetry. Sensory: No sensory deficit. Comments: Patient appears to be following commands with minor discrepancy in strength of her right-sided extremities (upper and lower) when compared to left. Sensation intact throughout. Patient has experiencing difficulty with expression of words but appears to have full understanding of commands and conversations that I am having with her at the bedside. No cranial nerve deficit appreciated on my examination.  strength is preserved as well as cerebellar findings including finger-nose and heel-to-shin testing. This appears to be consistent with the patient's initial presenting physical examination on arrival to the ICU on 10/27. Vitals and nursing note reviewed. Diagnostic Studies      EKG: No new ECG tracing to review at this time. Imaging: No new imaging to review following patient's neurological imaging during presentation and evaluation in the emergency department. I have personally reviewed pertinent films in PACS     Medications:  Scheduled PRN   cephalexin, 250 mg, HS  chlorhexidine, 15 mL, Q12H [facility]  cholecalciferol, 1,000 Units, Daily  DULoxetine, 30 mg, Daily  ferrous sulfate, 325 mg, Daily With Breakfast  gabapentin, 100 mg, HS  insulin detemir, 35 Units, HS  insulin lispro, 1-6 Units, Q6H [facility]  levothyroxine, 125 mcg, Early Morning  pantoprazole, 40 mg, BID  pramipexole, 0.25 mg, HS  pravastatin, 40 mg, Daily With Dinner          Continuous    niCARdipine, 1-15 mg/hr, Last Rate: 1 mg/hr (10/28/23 0024)         Labs:    CBC    Recent Labs     10/27/23  1515   WBC 11.46*   HGB 11.9   HCT 37.3        BMP    Recent Labs     10/27/23  1515   SODIUM 137   K 3.9      CO2 26   AGAP 8   BUN 29*   CREATININE 2.33*   CALCIUM 9.5       Coags    Recent Labs     10/27/23  1515   INR 0.84   PTT 27        Additional Electrolytes  No recent results       Blood Gas    No recent results  No recent results LFTs  No recent results    Infectious  No recent results  Glucose  Recent Labs     10/27/23  1515   GLUC 151*               Critical Care Time Delivered : Upon my evaluation, this patient had a high probability of imminent or life-threatening deterioration due to potential for intracranial bleed in the setting of TNK administration, neurological sequelae/morbidity, respiratory failure, hypertensive crisis, which required my direct attention, intervention, and personal management. I have personally provided 35 minutes of critical care time, exclusive of procedures, teaching, family meetings, and any prior time recorded by providers other than myself.      Thompson Salmon MD

## 2023-10-28 NOTE — ASSESSMENT & PLAN NOTE
Last hemoglobin A1c on 10/3/2023 equals 7.3%    Recent Labs     10/27/23  1812 10/27/23  2351   POCGLU 132 167*       Blood Sugar Average: Last 72 hrs:  (P) 149.5  Home medication: Insulin detemir 50 units every evening, NovoLog 18, 18, 25  Continue home medication  Sliding scale  Diabetic diet

## 2023-10-28 NOTE — ASSESSMENT & PLAN NOTE
Patient had inspire on 11/10/2021 however patient is not compliant with this  Plan   -Continue trending oxygenation values in the intensive care unit   -SPO2 greater than 92%   -Supplemental oxygen as tolerated   -Patient is having difficulty with breathing overnight, consider utilization of CPAP to help with breathing while sleeping.

## 2023-10-28 NOTE — OCCUPATIONAL THERAPY NOTE
Occupational Therapy Cancellation Note         Patient Name: Constance Souza  SDNMB'S Date: 10/28/2023         10/28/23 1230   Note Type   Note type Cancelled Session   Cancel Reasons Medical status   Additional Comments OT orders received, chart review completed. Pt received TNK @4435 on 10/27/23. Spoke to Bety Cook who reports that pt is appropriate to sit EOB but not for further OOB activity at this time, will hold OT evaluation and see when pt is appropriate for further OOB activity.      Eli Gallegos MS, OTR/L

## 2023-10-29 LAB
GLUCOSE SERPL-MCNC: 125 MG/DL (ref 65–140)
GLUCOSE SERPL-MCNC: 172 MG/DL (ref 65–140)
GLUCOSE SERPL-MCNC: 79 MG/DL (ref 65–140)
GLUCOSE SERPL-MCNC: 87 MG/DL (ref 65–140)
VIT B12 SERPL-MCNC: 373 PG/ML (ref 180–914)

## 2023-10-29 PROCEDURE — 99232 SBSQ HOSP IP/OBS MODERATE 35: CPT | Performed by: PHYSICIAN ASSISTANT

## 2023-10-29 PROCEDURE — 83918 ORGANIC ACIDS TOTAL QUANT: CPT | Performed by: PSYCHIATRY & NEUROLOGY

## 2023-10-29 PROCEDURE — 82948 REAGENT STRIP/BLOOD GLUCOSE: CPT

## 2023-10-29 PROCEDURE — 82607 VITAMIN B-12: CPT | Performed by: PSYCHIATRY & NEUROLOGY

## 2023-10-29 PROCEDURE — 97163 PT EVAL HIGH COMPLEX 45 MIN: CPT

## 2023-10-29 PROCEDURE — C9113 INJ PANTOPRAZOLE SODIUM, VIA: HCPCS | Performed by: NURSE PRACTITIONER

## 2023-10-29 RX ORDER — QUETIAPINE FUMARATE 25 MG/1
12.5 TABLET, FILM COATED ORAL
Status: DISCONTINUED | OUTPATIENT
Start: 2023-10-29 | End: 2023-10-30 | Stop reason: HOSPADM

## 2023-10-29 RX ADMIN — GABAPENTIN 100 MG: 100 CAPSULE ORAL at 21:28

## 2023-10-29 RX ADMIN — FERROUS SULFATE TAB 325 MG (65 MG ELEMENTAL FE) 325 MG: 325 (65 FE) TAB at 08:54

## 2023-10-29 RX ADMIN — PRAMIPEXOLE DIHYDROCHLORIDE 0.25 MG: 0.25 TABLET ORAL at 21:28

## 2023-10-29 RX ADMIN — LOSARTAN POTASSIUM 25 MG: 25 TABLET, FILM COATED ORAL at 08:53

## 2023-10-29 RX ADMIN — DULOXETINE HYDROCHLORIDE 30 MG: 30 CAPSULE, DELAYED RELEASE ORAL at 08:53

## 2023-10-29 RX ADMIN — HEPARIN SODIUM 5000 UNITS: 5000 INJECTION INTRAVENOUS; SUBCUTANEOUS at 21:28

## 2023-10-29 RX ADMIN — PANTOPRAZOLE SODIUM 40 MG: 40 INJECTION, POWDER, FOR SOLUTION INTRAVENOUS at 16:46

## 2023-10-29 RX ADMIN — Medication 1000 UNITS: at 08:53

## 2023-10-29 RX ADMIN — CHLORHEXIDINE GLUCONATE 15 ML: 1.2 SOLUTION ORAL at 21:28

## 2023-10-29 RX ADMIN — INSULIN LISPRO 1 UNITS: 100 INJECTION, SOLUTION INTRAVENOUS; SUBCUTANEOUS at 16:43

## 2023-10-29 RX ADMIN — HEPARIN SODIUM 5000 UNITS: 5000 INJECTION INTRAVENOUS; SUBCUTANEOUS at 13:53

## 2023-10-29 RX ADMIN — AMLODIPINE BESYLATE 5 MG: 5 TABLET ORAL at 08:53

## 2023-10-29 RX ADMIN — PRAVASTATIN SODIUM 40 MG: 40 TABLET ORAL at 16:44

## 2023-10-29 RX ADMIN — INSULIN LISPRO 15 UNITS: 100 INJECTION, SOLUTION INTRAVENOUS; SUBCUTANEOUS at 08:53

## 2023-10-29 RX ADMIN — METOPROLOL TARTRATE 50 MG: 50 TABLET, FILM COATED ORAL at 21:30

## 2023-10-29 RX ADMIN — CHLORHEXIDINE GLUCONATE 15 ML: 1.2 SOLUTION ORAL at 08:53

## 2023-10-29 RX ADMIN — HEPARIN SODIUM 5000 UNITS: 5000 INJECTION INTRAVENOUS; SUBCUTANEOUS at 05:36

## 2023-10-29 RX ADMIN — INSULIN LISPRO 15 UNITS: 100 INJECTION, SOLUTION INTRAVENOUS; SUBCUTANEOUS at 16:44

## 2023-10-29 RX ADMIN — PANTOPRAZOLE SODIUM 40 MG: 40 INJECTION, POWDER, FOR SOLUTION INTRAVENOUS at 08:53

## 2023-10-29 RX ADMIN — LEVOTHYROXINE SODIUM 125 MCG: 125 TABLET ORAL at 05:36

## 2023-10-29 RX ADMIN — INSULIN DETEMIR 35 UNITS: 100 INJECTION, SOLUTION SUBCUTANEOUS at 22:16

## 2023-10-29 RX ADMIN — ASPIRIN 81 MG: 81 TABLET ORAL at 09:45

## 2023-10-29 RX ADMIN — CEPHALEXIN 250 MG: 250 CAPSULE ORAL at 21:28

## 2023-10-29 RX ADMIN — METOPROLOL TARTRATE 50 MG: 50 TABLET, FILM COATED ORAL at 08:52

## 2023-10-29 NOTE — PROGRESS NOTES
[address] [facility]  Progress Note  Name: Kareen Woody  MRN: [id number]  Unit/Bed#: S -01 I Date of Admission: 10/27/2023   Date of Service: 10/29/2023 I Hospital Day: 2    Assessment/Plan   * Stroke-like symptom  Assessment & Plan  Pt p/w as stroke alert, s/p TNK at 1538 on 10/27/23.  Had severe HA, word findings difficulty/aphasia   She had recent outpatient MRI on 10/26 that was negative for abnormal findings/atrophy   CTH negative, CTA without high grade stenosis of occlusion   Repeat CTH stable post TNKtPA  MRI pending (radiology to coordinate with inspire tech on Monday)   Echocardiogram pending   Routine EEG pending  Goal BP now normotensive   Appreciate neurology follow up     Type 2 diabetes mellitus [facility])  Assessment & Plan  Lab Results   Component Value Date    HGBA1C 7.1 (H) 10/28/2023     Recent Labs     10/28/23  1548 10/28/23  1843 10/28/23  2114 10/29/23  0625   POCGLU 132 227* 161* 125     Blood Sugar Average: Last 72 hrs:  (P) 149.125  Home meds: levemir 50u qhs, novolog 18/18/25  Continue SSI and accuchecks    CKD (chronic kidney disease) stage 4, GFR 15-29 ml/min [facility])  Assessment & Plan  Lab Results   Component Value Date    EGFR 22 10/28/2023    EGFR 11 10/27/2023    CREATININE 2.15 (H) 10/28/2023    CREATININE 2.33 (H) 10/27/2023   Baseline CR 2-2.16   Cr currently at BL      HTN (hypertension)  Assessment & Plan  Home regimen: Losartan 25 mg once daily, Torsemide 5 mg once daily as needed if weight exceeds 172 pounds , Metoprolol tartrate 50 mg every 12 hours, Amlodipine 5 mg once daily  Required nicardipine drip for BP control s/p TNK in ICU   Goal BP now normotensive     History of recurrent UTIs  Assessment & Plan  Apparently on Keflex for 30 days through 11/11     SHAYAN (obstructive sleep apnea)  Assessment & Plan  Hx of inspire 11/10/2021, though noncompliant and device turned off   Consider CPAP qHS while admitted     HLD (hyperlipidemia)  Assessment & Plan  At home on crestor 5mg daily          VTE Pharmacologic Prophylaxis:   High Risk (Score >/= 5) - Pharmacological DVT Prophylaxis Ordered: heparin. Sequential Compression Devices Ordered. Patient Centered Rounds: I performed bedside rounds with nursing staff today. Discussions with Specialists or Other Care Team Provider: nursing     Education and Discussions with Family / Patient: Patient declined call to . Total Time Spent on Date of Encounter in care of patient: This time was spent on one or more of the following: performing physical exam; counseling and coordination of care; obtaining or reviewing history; documenting in the medical record; reviewing/ordering tests, medications or procedures; communicating with other healthcare professionals and discussing with patient's family/caregivers. Current Length of Stay: 2 day(s)  Current Patient Status: Inpatient   Certification Statement: The patient will continue to require additional inpatient hospital stay due to ongoing neurology work up for stoke s/p TNKtPA , safe d/c planning, pending PT OT   Discharge Plan: Anticipate discharge in 24-48 hrs to discharge location to be determined pending rehab evaluations. Code Status: Level 3 - DNAR and DNI    Subjective:   Pt seen and examined at bedside. Trying to get out of bed to get washed up. Difficult to redirect. Denies dizziness or headaches. Objective:     Vitals:   Temp (24hrs), Av.7 °F (37.1 °C), Min:97.5 °F (36.4 °C), Max:99.3 °F (37.4 °C)    Temp:  [97.5 °F (36.4 °C)-99.3 °F (37.4 °C)] 99.2 °F (37.3 °C)  HR:  [] 94  Resp:  [12-53] 18  BP: (138-186)/() 157/93  SpO2:  [94 %-97 %] 96 %  Body mass index is 29.37 kg/m². Input and Output Summary (last 24 hours):      Intake/Output Summary (Last 24 hours) at 10/29/2023 1215  Last data filed at 10/28/2023 1800  Gross per 24 hour   Intake 42.67 ml   Output 124 ml   Net -81.33 ml     Physical Exam:   Physical Exam  Constitutional: Appearance: Normal appearance. Comments: Climbing out of bed    HENT:      Head: Normocephalic and atraumatic. Eyes:      Extraocular Movements: Extraocular movements intact. Cardiovascular:      Rate and Rhythm: Normal rate and regular rhythm. Pulmonary:      Effort: Pulmonary effort is normal.      Breath sounds: Normal breath sounds. Abdominal:      General: Abdomen is flat. Palpations: Abdomen is soft. Musculoskeletal:         General: No swelling. Normal range of motion. Cervical back: Normal range of motion and neck supple. Skin:     General: Skin is warm and dry. Neurological:      General: No focal deficit present. Mental Status: She is alert.       Comments: No focal neuro deficit apart from waxing and waning confusion, difficult to redirect        Additional Data:     Labs:  Results from last 7 days   Lab Units 10/28/23  0404   WBC Thousand/uL 11.81*   HEMOGLOBIN g/dL 11.6   HEMATOCRIT % 35.9   PLATELETS Thousands/uL 262   NEUTROS PCT % 74   LYMPHS PCT % 18   MONOS PCT % 6   EOS PCT % 2     Results from last 7 days   Lab Units 10/28/23  0404   SODIUM mmol/L 138   POTASSIUM mmol/L 3.3*   CHLORIDE mmol/L 105   CO2 mmol/L 26   BUN mg/dL 24   CREATININE mg/dL 2.15*   ANION GAP mmol/L 7   CALCIUM mg/dL 8.8   GLUCOSE RANDOM mg/dL 114     Results from last 7 days   Lab Units 10/27/23  1515   INR  0.84     Results from last 7 days   Lab Units 10/29/23  1112 10/29/23  0625 10/28/23  2114 10/28/23  1843 10/28/23  1548 10/28/23  1237 10/28/23  0558 10/27/23  2351 10/27/23  1812   POC GLUCOSE mg/dl 79 125 161* 227* 132 146* 103 167* 132     Results from last 7 days   Lab Units 10/28/23  0404   HEMOGLOBIN A1C % 7.1*           Lines/Drains:  Invasive Devices       Peripheral Intravenous Line  Duration             Peripheral IV 10/29/23 Left;Ventral (anterior) Forearm <1 day                  Telemetry:  Telemetry Orders (From admission, onward)               24 Hour Telemetry Monitoring  Continuous x 24 Hours (Telem)        Question:  Reason for 24 Hour Telemetry  Answer:  TIA/Suspected CVA/ Confirmed CVA                     Telemetry Reviewed: Normal Sinus Rhythm  Indication for Continued Telemetry Use: Acute CVA             Imaging: Reviewed radiology reports from this admission including: CT head    Recent Cultures (last 7 days):         Last 24 Hours Medication List:   Current Facility-Administered Medications   Medication Dose Route Frequency Provider Last Rate    amLODIPine  5 mg Oral Daily Catherin Erp, CRNP      aspirin  81 mg Oral Daily Elina DO Juan Manuel      cephalexin  250 mg Oral HS Catherin Erp, CRNP      chlorhexidine  15 mL Mouth/Throat Q12H [address]      cholecalciferol  1,000 Units Oral Daily Catherin Erp, CRNP      DULoxetine  30 mg Oral Daily Catherin Erp, CRNP      ferrous sulfate  325 mg Oral Daily With Breakfast Catherin Erp, CRNP      gabapentin  100 mg Oral HS Catherin Erp, CRNP      heparin (porcine)  5,000 Units Subcutaneous [facility] [address]      hydrALAZINE  5 mg Intravenous Q6H PRN Catherin Erp, CRNP      insulin detemir  50 Units Subcutaneous HS Catherin Erp, CRNP      insulin lispro  1-6 Units Subcutaneous TID [facility] Catherin Erp, CRNP      insulin lispro  15 Units Subcutaneous TID With Meals Catherin Erp, CRNP      levothyroxine  125 mcg Oral Early Morning Catherin Erp, CRNP      losartan  25 mg Oral Daily Catherin Erp, CRNP      metoprolol tartrate  50 mg Oral Q12H [address]      pantoprazole  40 mg Intravenous BID Catherin Erp, CRNP      pramipexole  0.25 mg Oral HS Catherin Erp, CRNP      pravastatin  40 mg Oral Daily With [address]          Today, Patient Was Seen By: Terri Herrera PA-C    **Please Note: This note may have been constructed using a voice recognition system. **

## 2023-10-29 NOTE — PLAN OF CARE
Problem: MOBILITY - ADULT  Goal: Maintain or return to baseline ADL function  Description: INTERVENTIONS:  -  Assess patient's ability to carry out ADLs; assess patient's baseline for ADL function and identify physical deficits which impact ability to perform ADLs (bathing, care of mouth/teeth, toileting, grooming, dressing, etc.)  - Assess/evaluate cause of self-care deficits   - Assess range of motion  - Assess patient's mobility; develop plan if impaired  - Assess patient's need for assistive devices and provide as appropriate  - Encourage maximum independence but intervene and supervise when necessary  - Involve family in performance of ADLs  - Assess for home care needs following discharge   - Consider OT consult to assist with ADL evaluation and planning for discharge  - Provide patient education as appropriate  Outcome: Progressing  Goal: Maintains/Returns to pre admission functional level  Description: INTERVENTIONS:  - Perform BMAT or MOVE assessment daily.   - Set and communicate daily mobility goal to care team and patient/family/caregiver. - Collaborate with rehabilitation services on mobility goals if consulted  - Perform Range of Motion 4 times a day. - Reposition patient every 2 hours. - Dangle patient 3 times a day  - Stand patient 3 times a day  - Ambulate patient 3 times a day  - Out of bed to chair 3 times a day   - Out of bed for meals 3 times a day  - Out of bed for toileting  - Record patient progress and toleration of activity level   Outcome: Progressing     Problem: Neurological Deficit  Goal: Neurological status is stable or improving  Description: Interventions:  - Monitor and assess patient's level of consciousness, motor function, sensory function, and level of assistance needed for ADLs. - Monitor and report changes from baseline. Collaborate with interdisciplinary team to initiate plan and implement interventions as ordered. - Provide and maintain a safe environment.   - Consider seizure precautions. - Consider fall precautions. - Consider aspiration precautions. - Consider bleeding precautions. Outcome: Progressing     Problem: Communication Impairment  Goal: Ability to express needs and understand communication  Description: Assess patient's communication skills and ability to understand information. Patient will demonstrate use of effective communication techniques, alternative methods of communication and understanding even if not able to speak. - Encourage communication and provide alternate methods of communication as needed. - Collaborate with case management/ for discharge needs. - Include patient/family/caregiver in decisions related to communication. Outcome: Progressing     Problem: Prexisting or High Potential for Compromised Skin Integrity  Goal: Skin integrity is maintained or improved  Description: INTERVENTIONS:  - Identify patients at risk for skin breakdown  - Assess and monitor skin integrity  - Assess and monitor nutrition and hydration status  - Monitor labs   - Assess for incontinence   - Turn and reposition patient  - Assist with mobility/ambulation  - Relieve pressure over bony prominences  - Avoid friction and shearing  - Provide appropriate hygiene as needed including keeping skin clean and dry  - Evaluate need for skin moisturizer/barrier cream  - Collaborate with interdisciplinary team   - Patient/family teaching  - Consider wound care consult   Outcome: Progressing     Problem: Activity Intolerance/Impaired Mobility  Goal: Mobility/activity is maintained at optimum level for patient  Description: Interventions:  - Assess and monitor patient  barriers to mobility and need for assistive/adaptive devices. - Assess patient's emotional response to limitations. - Collaborate with interdisciplinary team and initiate plans and interventions as ordered.   - Encourage independent activity per ability.  - Maintain proper body alignment. - Perform active/passive rom as tolerated/ordered. - Plan activities to conserve energy.  - Turn patient as appropriate  Outcome: Progressing     Problem: Potential for Aspiration  Goal: Non-ventilated patient's risk of aspiration is minimized  Description: Assess and monitor vital signs, respiratory status, and labs (WBC). Monitor for signs of aspiration (tachypnea, cough, rales, wheezing, cyanosis, fever). - Assess and monitor patient's ability to swallow. - Place patient up in chair to eat if possible. - HOB up at 90 degrees to eat if unable to get patient up into chair.  - Supervise patient during oral intake. - Instruct patient/ family to take small bites. - Instruct patient/ family to take small single sips when taking liquids. - Follow patient-specific strategies generated by speech pathologist.  Outcome: Progressing  Goal: Ventilated patient's risk of aspiration is minimized  Description: Assess and monitor vital signs, respiratory status, airway cuff pressure, and labs (WBC). Monitor for signs of aspiration (tachypnea, cough, rales, wheezing, cyanosis, fever). - Elevate head of bed 30 degrees if patient has tube feeding.  - Monitor tube feeding. Outcome: Progressing     Problem: Nutrition  Goal: Nutrition/Hydration status is improving  Description: Monitor and assess patient's nutrition/hydration status for malnutrition (ex- brittle hair, bruises, dry skin, pale skin and conjunctiva, muscle wasting, smooth red tongue, and disorientation). Collaborate with interdisciplinary team and initiate plan and interventions as ordered. Monitor patient's weight and dietary intake as ordered or per policy. Utilize nutrition screening tool and intervene per policy. Determine patient's food preferences and provide high-protein, high-caloric foods as appropriate. - Assist patient with eating.  - Allow adequate time for meals.  - Encourage patient to take dietary supplement as ordered.   - Collaborate with clinical nutritionist.  - Include patient/family/caregiver in decisions related to nutrition.   Outcome: Progressing     Problem: SAFETY,RESTRAINT: NV/NON-SELF DESTRUCTIVE BEHAVIOR  Goal: Remains free of harm/injury (restraint for non violent/non self-detsructive behavior)  Description: INTERVENTIONS:  - Instruct patient/family regarding restraint use   - Assess and monitor physiologic and psychological status   - Provide interventions and comfort measures to meet assessed patient needs   - Identify and implement measures to help patient regain control  - Assess readiness for release of restraint   Outcome: Progressing  Goal: Returns to optimal restraint-free functioning  Description: INTERVENTIONS:  - Assess the patient's behavior and symptoms that indicate continued need for restraint  - Identify and implement measures to help patient regain control  - Assess readiness for release of restraint   Outcome: Progressing

## 2023-10-29 NOTE — PHYSICAL THERAPY NOTE
PHYSICAL THERAPY EVALUATION  NAME:  Reid Sanabria  DATE: 10/29/23    AGE:   75 y.o. Mrn:   [id number]  Principal problem: Principal Problem:    Stroke-like symptom  Active Problems:    HTN (hypertension)    HLD (hyperlipidemia)    SHAYAN (obstructive sleep apnea)    History of recurrent UTIs    CKD (chronic kidney disease) stage 4, GFR 15-29 ml/min ([facility])    Type 2 diabetes mellitus ([address])      Vitals:    10/29/23 0412 10/29/23 0549 10/29/23 0626 10/29/23 1501   BP: 138/72  157/93 144/75   BP Location: Left arm      Pulse: 94   82   Resp: 14  18 16   Temp: 97.5 °F (36.4 °C)  99.2 °F (37.3 °C) 98.9 °F (37.2 °C)   TempSrc: Axillary      SpO2: 96%   92%   Weight:  77.6 kg (171 lb 1.2 oz)     Height:           Length Of Stay: 2  Performed at least 2 patient identifiers during session: Name and Birthday  PHYSICAL THERAPY EVALUATION :    10/29/23 1543   PT Last Visit   PT Visit Date 10/29/23   Note Type   Note type Evaluation   Pain Assessment   Pain Assessment Tool 0-10   Pain Score No Pain   Restrictions/Precautions   Weight Bearing Precautions Per Order No   Other Precautions Bed Alarm; Chair Alarm; Fall Risk;O2;Restraints  (?visual impairment, was on .5 l/m but no masimo; L LE and R wrist restraint upon entering room)   Home Living   Type of [address] [place] [address] Two level  (no LEYLA vs "1/2" LEYLA)   Home Equipment   (independent with ADLs and ambulation prior to admission, has a straight cane and a rollator.)   Additional Comments information above is Per CM information as pt is an inconsistent historian   Prior Function   Level of [place]   (Patient receives assistance as needed from  for ADLS and utilizes her cane in home and rollator in community)   Lives With Spouse; Son   Sandie Eckert Help From   (Patient has hx of HHC/STR/OPPT and spouse provides transport to appointments.)   IADLs Family/Friend/Other provides medication management; Family/Friend/Other provides meals; Family/Friend/Other provides transportation Falls in the last 6 months   (Pt unable to quantify)   General   Family/Caregiver Present No   Cognition   Overall Cognitive Status Impaired   Arousal/Participation Responsive   Orientation Level Oriented to person;Disoriented to situation;Disoriented to time;Disoriented to place   Memory Decreased recall of precautions;Decreased recall of recent events;Decreased short term memory   Following Commands Follows one step commands with increased time or repetition   Subjective   Subjective Pt startled awake. Cooperative. Agreeable to participate   Strength RLE   R Hip Flexion 4-/5   R Knee Extension 4/5   R Ankle Dorsiflexion 4+/5   Strength LLE   L Hip Flexion 4-/5   L Knee Extension 4/5   L Ankle Dorsiflexion 4+/5   Vision-Basic Assessment   Current Vision (S)    (Able to follow smooth pursuit better on R visual field as opposed to L, but limited command following/attention)   Patient Visual Report   (Pt inconsistent w/ reading words on large TV at side vision (central vision ok), inaccurate consistently with time on clock. During mobility pt veering to R and striking obstacles on R side only)   Coordination   Movements are Fluid and Coordinated 0   Coordination and Movement Description limited fluidity of movement, analia during gait trials. Light Touch   RLE Light Touch Not tested  (due to ? reliability of responses)   LLE Light Touch Not tested  (due to ? reliability of responses)   Bed Mobility   Supine to Sit 4  Minimal assistance   Additional items Bedrails; Increased time required;Verbal cues;LE management   Sit to Supine 4  Minimal assistance   Additional items Assist x 1; Increased time required;Verbal cues;LE management; Bedrails   Transfers   Sit to Stand 4  Minimal assistance   Additional items Assist x 1; Increased time required;Verbal cues;Armrests  (steadying A , instruction for hand placement; 4 reps as pt needed reinstruction for repositioning toward HOB while in short sit)   Stand to Sit 4  Minimal assistance   Additional items Assist x 1; Increased time required;Verbal cues;Armrests  (steadying A, instruction for completion of approach)   Ambulation/Elevation   Gait pattern Excessively slow; Step through pattern  (R drift and multiple episodes of running into obstacles on R side. increased walker advancement especially for turning)   Gait Assistance 4  Minimal assist  (analia for walker management around obstacles)   Additional items Assist x 1;Verbal cues   Assistive Device Rolling walker   Distance 220'   Stair Management Assistance Not tested   Balance   Static Sitting Good   Static Standing Fair   Ambulatory Poor +   Endurance Deficit   Endurance Deficit No   Activity Tolerance   Activity Tolerance Patient tolerated treatment well   Medical Staff Made Aware spoke to Jax Gonzalez from case management   Nurse Made Aware spoke to Daniel RN     Assessment:   Pt is a 75 y.o. female seen for PT evaluation s/p [facility] w/ Stroke-like symptom. Tenecteplace given 3: 58 pm day of admit. Order placed for PT. Prior to admission: Pt lives with spouse and son in a first-floor set up of a two-story home. Patient reportedly has either 0 or "half"  steps to enter. She was using a cane inside the home and a rollator in the community, but it is not certain if she did require actual physical assistance for ambulation. Upon evaluation: Pt needed min assist for bed mobility, transfers, and ambulation with a rolling walker--especially due to limited obstacle negotiation on right side. Pt's clinical presentation is currently unstable/unpredictable given the functional mobility deficits above, especially (but not limited to) gait deviations, decreased safety awareness, and ? vision, and combined with medical complications of hypertension , abnormal renal lab values, abnormal WBCs, and impulsivity during admission.   She is at risk for falls based on impulsivity, impaired balance, impaired judgement, decreased safety awareness, decreased cognition, and ? Vision vs limited obstacle negotiation, . During this admission, pt would benefit from continued skilled inpatient PT in the acute care setting in order to address deficits as defined above to maximize function and mobility. Recommendations:    From a PT standpoint, recommend next several sessions focus on continued functional mobility training with a rolling versus rollator walker, stair training, patient/family education. Recommend OT for cognition   Prognosis Fair   Problem List Decreased endurance; Impaired balance;Decreased mobility; Decreased coordination;Decreased cognition; Impaired judgement;Decreased safety awareness  (anticipated limited vision)   Barriers to Discharge Decreased caregiver support; Inaccessible home environment  (uncertain of level of time/A that family can provide A for pt @ home)   Goals   Patient Goals none stated other than to walk   STG Expiration Date 11/08/23   Short Term Goal #1 Pt will: Perform rolling  and supine<>sit bed mobility tasks with Supervision to prepare for transfers and reposition in bed. Perform transfers with Supervision to decrease risk for falls and promote proper hand placement and approach. Perform ambulation with L RAD for up to 150 feet with Supervision to promote proper use of assistive device including negotiation around obstacles. Perform 1 step w/ UE support and w/no more than min A to return to home with LEYLA. PT Treatment Day 0   Plan   Treatment/Interventions Functional transfer training;LE strengthening/ROM; Elevations; Therapeutic exercise; Endurance training;Cognitive reorientation;Patient/family training;Equipment eval/education; Bed mobility;Gait training;Spoke to case management;Spoke to nursing   PT Frequency 2-3x/wk   Discharge Recommendation   PT Discharge Recommendation (S)  Home with home health rehabilitation  (as long as spouse/son can provide needed A For pt upon DC--AT present time, pt needs no more than MIN A for bed mobility, transfers, and amb w/ walker.)   Equipment Recommended Walker   Additional Comments Nursing Recommendations:    Mobility Plan as of 10/29/23: Pt is one Assist with RW to/from recliner, BSC, bathroom, and hallway. Additional Comments 2 Co-morbidities affecting pt's physical performance at time of assessment include but are not limited to: hypertension, diabetes mellitus, hypothyroidism and SHAYAN, multiple UTIs. Personal factors affecting pt at time of IE include: steps to enter environment, advanced age, behavioral pattern, inability to perform IADLs, inability to perform ADLs, inability to navigate community distances, and limited insight into impairments. AM-PAC Basic Mobility Inpatient   Turning in Flat Bed Without Bedrails 3   Lying on Back to Sitting on Edge of Flat Bed Without Bedrails 2   Moving Bed to Chair 3   Standing Up From Chair Using Arms 3   Walk in Room 3   Climb 3-5 Stairs With Railing 3   Basic Mobility Inpatient Raw Score 17   Basic Mobility Standardized Score 39.67   Highest Level Of Mobility   JH-HLM Goal 5: Stand one or more mins   JH-HLM Achieved 7: Walk 25 feet or more   End of Consult   Patient Position at End of Consult Supine; All needs within reach;Bed/Chair alarm activated  (as pt unable to stay in chair with wrist/leg restraints in place.)   (Please find full objective findings from PT assessment regarding body systems outlined above). The patient's -[facility] Basic Mobility Inpatient Short Form Raw Score is 17. A Raw score of greater than 16 suggests the patient may benefit from discharge to home, which DOES coincide with CURRENT above PT recommendations if family can provide anticipated assistance for patient upon discharge. However please refer to therapist recommendation for discharge planning given other factors that may influence destination. Adapted from [employer] of -PAC “6-Clicks” Basic Mobility and Daily Activity Scores With Discharge Destination. Physical Therapy, 2021;101:1-9. DOI: 10.1093/ptj/gpeu594    Portions of the record may have been created with voice recognition software. Occasional wrong word or "sound a like" substitutions may have occurred due to the inherent limitations of voice recognition software.   Read the chart carefully and recognize, using context, where substitutions have occurred

## 2023-10-29 NOTE — ASSESSMENT & PLAN NOTE
Lab Results   Component Value Date    HGBA1C 7.1 (H) 10/28/2023     Recent Labs     10/28/23  1843 10/28/23  2114 10/29/23  0625 10/29/23  1112   POCGLU 227* 161* 125 79     Blood Sugar Average: Last 72 hrs:  (P) 149.125  Home meds: levemir 50u qhs, novolog 18/18/25  Continue levemir and decrease meal time insulin to 15u with meals   Continue SSI and accuchecks

## 2023-10-29 NOTE — PLAN OF CARE
Problem: PHYSICAL THERAPY ADULT  Goal: Performs mobility at highest level of function for planned discharge setting. See evaluation for individualized goals. Description: Treatment/Interventions: Functional transfer training, LE strengthening/ROM, Elevations, Therapeutic exercise, Endurance training, Cognitive reorientation, Patient/family training, Equipment eval/education, Bed mobility, Gait training, Spoke to case management, Spoke to nursing  Equipment Recommended: Mayelin Velasco       See flowsheet documentation for full assessment, interventions and recommendations. Note: Prognosis: Fair  Problem List: Decreased endurance, Impaired balance, Decreased mobility, Decreased coordination, Decreased cognition, Impaired judgement, Decreased safety awareness (anticipated limited vision)  Assessment: Pt is a 75 y.o. female seen for PT evaluation s/p admit to [facility] on 10/27/2023 w/ Stroke-like symptom. Tenecteplace given 3: 58 pm day of admit. Order placed for PT. Prior to admission: Pt lives with spouse and son in a first-floor set up of a two-story home. Patient reportedly has either 0 or "half"  steps to enter. She was using a cane inside the home and a rollator in the community, but it is not certain if she did require actual physical assistance for ambulation. Upon evaluation: Pt needed min assist for bed mobility, transfers, and ambulation with a rolling walker--especially due to limited obstacle negotiation on right side. Pt's clinical presentation is currently unstable/unpredictable given the functional mobility deficits above, especially (but not limited to) gait deviations, decreased safety awareness, and ? vision , and combined with medical complications of hypertension , abnormal renal lab values, abnormal WBCs, and impulsivity during admission.   She is at risk for falls based on impulsivity, impaired balance, impaired judgement, decreased safety awareness, decreased cognition, and ? Vision vs limited obstacle negotiation , . During this admission, pt would benefit from continued skilled inpatient PT in the acute care setting in order to address deficits as defined above to maximize function and mobility. Recommendations:     From a PT standpoint, recommend next several sessions focus on continued functional mobility training with a rolling versus rollator walker, stair training, patient/family education. Barriers to Discharge: Decreased caregiver support, Inaccessible home environment (uncertain of level of time/A that family can provide A for pt @ home)     PT Discharge Recommendation: (S) Home with home health rehabilitation (as long as spouse/son can provide needed A For pt upon DC--AT present time, pt needs no more than MIN A for bed mobility, transfers, and amb w/ walker.)    See flowsheet documentation for full assessment.

## 2023-10-29 NOTE — ED ATTENDING ATTESTATION
10/27/2023  IUte MD, saw and evaluated the patient. I have discussed the patient with the resident/non-physician practitioner and agree with the resident's/non-physician practitioner's findings, Plan of Care, and MDM as documented in the resident's/non-physician practitioner's note, except where noted. All available labs and Radiology studies were reviewed. I was present for key portions of any procedure(s) performed by the resident/non-physician practitioner and I was immediately available to provide assistance. At this point I agree with the current assessment done in the Emergency Department. I have conducted an independent evaluation of this patient a history and physical is as follows:    Patient is a 69-year-old female who presents to the emergency department via EMS. Prehospital stroke alert was initiated given abrupt change in mentation with presence of aphasia and facial droop prior to arrival.  Patient has been alert though nonverbal for EMS. Glucose was unremarkable at 177. Blood pressure was elevated at 190/100. She reportedly does have a degree of left facial droop secondary to remote event. She remains t alert and nonverbal on arrival.  Airway intact. She does not follow most commands although does have some independent ranging of extremities. NIH stroke scale initiated just prior to and completed immediately following CT imaging. Exam findings notable for combination of receptive and expressive aphasia, right facial droop, bilateral leg weakness and slight right leg weakness. Patient was taken directly to CT scan on arrival.    ED Course  ED Course as of 10/28/23 2119   Fri Oct 27, 2023   1528 Reported onset of headache 1 hour prior to arrival and development of aphasia approximately 30 minutes prior to arrival.  She has been nonverbal for EMS which continued on arrival.  Mild right facial droop appreciated.   She has both expressive and receptive aphasia. CT scan performed. Neurology was at bedside shortly after patient arrived. They are currently on the phone speaking with her  for further history. She is not anticoagulated. Amlodipine dose was doubled last night given recent elevated pressures. After CT imaging and continued assessment in room she is starting to speak some with brief yes/no answers and has increased general movement of extremities-spontaneous. She does not follow command although does seem to have some understanding. Potentially within time window for lytic administration. Neurology team currently on phone with .  confirmed for neurology an abrupt change in mentation shortly prior to arrival.  She did have urine testing yesterday which was not diagnostic of UTI. With concern Held for CVA neurology team did speak with  and obtain consent for lytic administration. Patient did have improvement in symptoms prior to its administration. She began responding with brief one-word answers and following some basic commands. She had increased movement of all extremities.     Gwendolyn Morocho coordinated care with the admitting ICU team.     Critical Care Time  CriticalCare Time    Date/Time: 10/28/2023 9:18 PM    Performed by: Sruthi Damian MD  Authorized by: Sruthi Damian MD    Critical care provider statement:     Critical care time (minutes):  30    Critical care was necessary to treat or prevent imminent or life-threatening deterioration of the following conditions:  CNS failure or compromise    Critical care was time spent personally by me on the following activities:  Obtaining history from patient or surrogate, examination of patient, ordering and review of laboratory studies, ordering and performing treatments and interventions, ordering and review of radiographic studies, review of old charts, re-evaluation of patient's condition, evaluation of patient's response to treatment, development of treatment plan with patient or surrogate and discussions with consultants

## 2023-10-29 NOTE — ASSESSMENT & PLAN NOTE
Lab Results   Component Value Date    EGFR 22 10/28/2023    EGFR 11 10/27/2023    CREATININE 2.15 (H) 10/28/2023    CREATININE 2.33 (H) 10/27/2023   Baseline CR 2-2.16   Cr currently at [facility]

## 2023-10-29 NOTE — ASSESSMENT & PLAN NOTE
Pt p/w as stroke alert, s/p TNK at 1538 on 10/27/23. Had severe HA, word findings difficulty/aphasia   She had recent outpatient MRI on 10/26 that was negative for abnormal findings/atrophy   CTH negative, CTA without high grade stenosis of occlusion   Repeat CTH stable post TNKtPA  MRI pending (radiology to coordinate with inspire tech on Monday)   Echocardiogram pending   Routine EEG pending  Goal BP now normotensive   Appreciate neurology follow up   If consistently confused, not sleeping, etc. Can start seroquel qHS. Wean restraints as able.

## 2023-10-29 NOTE — ASSESSMENT & PLAN NOTE
Home regimen: Losartan 25 mg once daily, Torsemide 5 mg once daily as needed if weight exceeds 172 pounds , Metoprolol tartrate 50 mg every 12 hours, Amlodipine 5 mg once daily  Required nicardipine drip for BP control s/p TNK in ICU   Goal BP now normotensive

## 2023-10-29 NOTE — ASSESSMENT & PLAN NOTE
Hx of inspire 11/10/2021, though noncompliant and device turned off   Consider CPAP qHS while admitted

## 2023-10-30 ENCOUNTER — APPOINTMENT (INPATIENT)
Dept: NON INVASIVE DIAGNOSTICS | Facility: HOSPITAL | Age: 73
DRG: 062 | End: 2023-10-30
Payer: MEDICARE

## 2023-10-30 ENCOUNTER — APPOINTMENT (INPATIENT)
Dept: MRI IMAGING | Facility: HOSPITAL | Age: 73
DRG: 062 | End: 2023-10-30
Payer: MEDICARE

## 2023-10-30 ENCOUNTER — HOME HEALTH ADMISSION (OUTPATIENT)
Dept: HOME HEALTH SERVICES | Facility: HOME HEALTHCARE | Age: 73
End: 2023-10-30

## 2023-10-30 VITALS
RESPIRATION RATE: 16 BRPM | TEMPERATURE: 98.5 F | HEART RATE: 62 BPM | WEIGHT: 170 LBS | HEIGHT: 64 IN | BODY MASS INDEX: 29.02 KG/M2 | DIASTOLIC BLOOD PRESSURE: 74 MMHG | SYSTOLIC BLOOD PRESSURE: 131 MMHG | OXYGEN SATURATION: 91 %

## 2023-10-30 PROBLEM — R41.0 CONFUSION: Status: ACTIVE | Noted: 2023-10-30

## 2023-10-30 PROBLEM — I63.9 STROKE (HCC): Status: ACTIVE | Noted: 2023-10-27

## 2023-10-30 LAB
ANION GAP SERPL CALCULATED.3IONS-SCNC: 6 MMOL/L
AORTIC ROOT: 2.6 CM
APICAL FOUR CHAMBER EJECTION FRACTION: 57 %
ASCENDING AORTA: 3.1 CM
ATRIAL RATE: 66 BPM
BUN SERPL-MCNC: 21 MG/DL (ref 5–25)
CALCIUM SERPL-MCNC: 8.2 MG/DL (ref 8.4–10.2)
CHLORIDE SERPL-SCNC: 107 MMOL/L (ref 96–108)
CO2 SERPL-SCNC: 25 MMOL/L (ref 21–32)
CREAT SERPL-MCNC: 2.33 MG/DL (ref 0.6–1.3)
E WAVE DECELERATION TIME: 209 MS
E/A RATIO: 0.8
ERYTHROCYTE [DISTWIDTH] IN BLOOD BY AUTOMATED COUNT: 15.6 % (ref 11.6–15.1)
FRACTIONAL SHORTENING: 24 (ref 28–44)
GFR SERPL CREATININE-BSD FRML MDRD: 20 ML/MIN/1.73SQ M
GLUCOSE SERPL-MCNC: 111 MG/DL (ref 65–140)
GLUCOSE SERPL-MCNC: 115 MG/DL (ref 65–140)
GLUCOSE SERPL-MCNC: 119 MG/DL (ref 65–140)
GLUCOSE SERPL-MCNC: 121 MG/DL (ref 65–140)
GLUCOSE SERPL-MCNC: 133 MG/DL (ref 65–140)
HCT VFR BLD AUTO: 34.4 % (ref 34.8–46.1)
HGB BLD-MCNC: 11 G/DL (ref 11.5–15.4)
INTERVENTRICULAR SEPTUM IN DIASTOLE (PARASTERNAL SHORT AXIS VIEW): 1.5 CM
INTERVENTRICULAR SEPTUM: 1.5 CM (ref 0.6–1.1)
LAAS-AP2: 15.2 CM2
LAAS-AP4: 17.6 CM2
LEFT ATRIUM SIZE: 3.5 CM
LEFT ATRIUM VOLUME (MOD BIPLANE): 43 ML
LEFT ATRIUM VOLUME INDEX (MOD BIPLANE): 23.5 ML/M2
LEFT INTERNAL DIMENSION IN SYSTOLE: 2.5 CM (ref 2.1–4)
LEFT VENTRICULAR INTERNAL DIMENSION IN DIASTOLE: 3.3 CM (ref 3.5–6)
LEFT VENTRICULAR POSTERIOR WALL IN END DIASTOLE: 1.3 CM
LEFT VENTRICULAR STROKE VOLUME: 23 ML
LVSV (TEICH): 23 ML
MCH RBC QN AUTO: 28.5 PG (ref 26.8–34.3)
MCHC RBC AUTO-ENTMCNC: 32 G/DL (ref 31.4–37.4)
MCV RBC AUTO: 89 FL (ref 82–98)
MV E'TISSUE VEL-SEP: 6 CM/S
MV PEAK A VEL: 0.79 M/S
MV PEAK E VEL: 63 CM/S
MV STENOSIS PRESSURE HALF TIME: 60 MS
MV VALVE AREA P 1/2 METHOD: 3.67
P AXIS: 76 DEGREES
PLATELET # BLD AUTO: 256 THOUSANDS/UL (ref 149–390)
PMV BLD AUTO: 10.6 FL (ref 8.9–12.7)
POTASSIUM SERPL-SCNC: 3.2 MMOL/L (ref 3.5–5.3)
PR INTERVAL: 226 MS
QRS AXIS: 20 DEGREES
QRSD INTERVAL: 82 MS
QT INTERVAL: 422 MS
QTC INTERVAL: 442 MS
RBC # BLD AUTO: 3.86 MILLION/UL (ref 3.81–5.12)
RIGHT ATRIAL 2D VOLUME: 24 ML
RIGHT ATRIUM AREA SYSTOLE A4C: 11.7 CM2
RIGHT VENTRICLE ID DIMENSION: 2.9 CM
SL CV LEFT ATRIUM LENGTH A2C: 4.8 CM
SL CV LV EF: 55
SL CV PED ECHO LEFT VENTRICLE DIASTOLIC VOLUME (MOD BIPLANE) 2D: 45 ML
SL CV PED ECHO LEFT VENTRICLE SYSTOLIC VOLUME (MOD BIPLANE) 2D: 22 ML
SODIUM SERPL-SCNC: 138 MMOL/L (ref 135–147)
T WAVE AXIS: 17 DEGREES
TRICUSPID ANNULAR PLANE SYSTOLIC EXCURSION: 1.3 CM
VENTRICULAR RATE: 66 BPM
WBC # BLD AUTO: 9.38 THOUSAND/UL (ref 4.31–10.16)

## 2023-10-30 PROCEDURE — 93010 ELECTROCARDIOGRAM REPORT: CPT | Performed by: INTERNAL MEDICINE

## 2023-10-30 PROCEDURE — 82948 REAGENT STRIP/BLOOD GLUCOSE: CPT

## 2023-10-30 PROCEDURE — 85027 COMPLETE CBC AUTOMATED: CPT | Performed by: PHYSICIAN ASSISTANT

## 2023-10-30 PROCEDURE — 99239 HOSP IP/OBS DSCHRG MGMT >30: CPT | Performed by: PHYSICIAN ASSISTANT

## 2023-10-30 PROCEDURE — 99232 SBSQ HOSP IP/OBS MODERATE 35: CPT | Performed by: PSYCHIATRY & NEUROLOGY

## 2023-10-30 PROCEDURE — 93306 TTE W/DOPPLER COMPLETE: CPT | Performed by: INTERNAL MEDICINE

## 2023-10-30 PROCEDURE — 97167 OT EVAL HIGH COMPLEX 60 MIN: CPT

## 2023-10-30 PROCEDURE — 83918 ORGANIC ACIDS TOTAL QUANT: CPT

## 2023-10-30 PROCEDURE — 80048 BASIC METABOLIC PNL TOTAL CA: CPT | Performed by: PHYSICIAN ASSISTANT

## 2023-10-30 PROCEDURE — C9113 INJ PANTOPRAZOLE SODIUM, VIA: HCPCS | Performed by: NURSE PRACTITIONER

## 2023-10-30 PROCEDURE — 93306 TTE W/DOPPLER COMPLETE: CPT

## 2023-10-30 PROCEDURE — 70551 MRI BRAIN STEM W/O DYE: CPT

## 2023-10-30 PROCEDURE — 83516 IMMUNOASSAY NONANTIBODY: CPT

## 2023-10-30 RX ORDER — CLOPIDOGREL BISULFATE 75 MG/1
75 TABLET ORAL DAILY
Qty: 21 TABLET | Refills: 0 | Status: SHIPPED | OUTPATIENT
Start: 2023-10-30 | End: 2023-11-20

## 2023-10-30 RX ORDER — PANTOPRAZOLE SODIUM 40 MG/1
40 TABLET, DELAYED RELEASE ORAL
Status: DISCONTINUED | OUTPATIENT
Start: 2023-10-30 | End: 2023-10-30 | Stop reason: HOSPADM

## 2023-10-30 RX ORDER — MELATONIN
1000 DAILY
Refills: 0
Start: 2023-10-31

## 2023-10-30 RX ORDER — AMLODIPINE BESYLATE 5 MG/1
5 TABLET ORAL DAILY
Refills: 0
Start: 2023-10-31

## 2023-10-30 RX ORDER — PANTOPRAZOLE SODIUM 40 MG/1
40 TABLET, DELAYED RELEASE ORAL
Refills: 0
Start: 2023-10-30

## 2023-10-30 RX ORDER — PRAVASTATIN SODIUM 40 MG
40 TABLET ORAL
Refills: 0
Start: 2023-10-30

## 2023-10-30 RX ORDER — CYANOCOBALAMIN 1000 UG/ML
1000 INJECTION, SOLUTION INTRAMUSCULAR; SUBCUTANEOUS DAILY
Status: DISCONTINUED | OUTPATIENT
Start: 2023-10-30 | End: 2023-10-30 | Stop reason: HOSPADM

## 2023-10-30 RX ORDER — DULOXETIN HYDROCHLORIDE 30 MG/1
30 CAPSULE, DELAYED RELEASE ORAL DAILY
Refills: 0
Start: 2023-10-31

## 2023-10-30 RX ORDER — GABAPENTIN 100 MG/1
100 CAPSULE ORAL
Refills: 0
Start: 2023-10-30

## 2023-10-30 RX ORDER — INSULIN LISPRO 100 [IU]/ML
INJECTION, SOLUTION INTRAVENOUS; SUBCUTANEOUS
Refills: 0
Start: 2023-10-30

## 2023-10-30 RX ORDER — CEPHALEXIN 250 MG/1
250 CAPSULE ORAL
Refills: 0
Start: 2023-10-30 | End: 2023-11-11

## 2023-10-30 RX ORDER — LEVOTHYROXINE SODIUM 0.12 MG/1
125 TABLET ORAL
Refills: 0
Start: 2023-10-31

## 2023-10-30 RX ORDER — LOSARTAN POTASSIUM 25 MG/1
25 TABLET ORAL DAILY
Refills: 0
Start: 2023-10-31

## 2023-10-30 RX ORDER — FERROUS SULFATE 325(65) MG
325 TABLET ORAL
Refills: 0
Start: 2023-10-31

## 2023-10-30 RX ORDER — CLOPIDOGREL BISULFATE 75 MG/1
75 TABLET ORAL DAILY
Status: DISCONTINUED | OUTPATIENT
Start: 2023-10-30 | End: 2023-10-30 | Stop reason: HOSPADM

## 2023-10-30 RX ORDER — PRAMIPEXOLE DIHYDROCHLORIDE 0.25 MG/1
0.25 TABLET ORAL
Refills: 0
Start: 2023-10-30

## 2023-10-30 RX ORDER — METOPROLOL TARTRATE 50 MG/1
50 TABLET, FILM COATED ORAL EVERY 12 HOURS SCHEDULED
Refills: 0
Start: 2023-10-30

## 2023-10-30 RX ADMIN — PANTOPRAZOLE SODIUM 40 MG: 40 INJECTION, POWDER, FOR SOLUTION INTRAVENOUS at 08:24

## 2023-10-30 RX ADMIN — LOSARTAN POTASSIUM 25 MG: 25 TABLET, FILM COATED ORAL at 08:24

## 2023-10-30 RX ADMIN — CLOPIDOGREL BISULFATE 75 MG: 75 TABLET ORAL at 16:06

## 2023-10-30 RX ADMIN — LEVOTHYROXINE SODIUM 125 MCG: 125 TABLET ORAL at 05:54

## 2023-10-30 RX ADMIN — HEPARIN SODIUM 5000 UNITS: 5000 INJECTION INTRAVENOUS; SUBCUTANEOUS at 14:10

## 2023-10-30 RX ADMIN — INSULIN LISPRO 15 UNITS: 100 INJECTION, SOLUTION INTRAVENOUS; SUBCUTANEOUS at 16:06

## 2023-10-30 RX ADMIN — FERROUS SULFATE TAB 325 MG (65 MG ELEMENTAL FE) 325 MG: 325 (65 FE) TAB at 08:24

## 2023-10-30 RX ADMIN — ASPIRIN 81 MG: 81 TABLET ORAL at 08:24

## 2023-10-30 RX ADMIN — DULOXETINE HYDROCHLORIDE 30 MG: 30 CAPSULE, DELAYED RELEASE ORAL at 08:24

## 2023-10-30 RX ADMIN — AMLODIPINE BESYLATE 5 MG: 5 TABLET ORAL at 08:24

## 2023-10-30 RX ADMIN — CYANOCOBALAMIN 1000 MCG: 1000 INJECTION, SOLUTION INTRAMUSCULAR; SUBCUTANEOUS at 12:53

## 2023-10-30 RX ADMIN — PANTOPRAZOLE SODIUM 40 MG: 40 TABLET, DELAYED RELEASE ORAL at 16:06

## 2023-10-30 RX ADMIN — HEPARIN SODIUM 5000 UNITS: 5000 INJECTION INTRAVENOUS; SUBCUTANEOUS at 05:54

## 2023-10-30 RX ADMIN — PRAVASTATIN SODIUM 40 MG: 40 TABLET ORAL at 16:06

## 2023-10-30 RX ADMIN — Medication 1000 UNITS: at 08:25

## 2023-10-30 RX ADMIN — METOPROLOL TARTRATE 50 MG: 50 TABLET, FILM COATED ORAL at 08:24

## 2023-10-30 RX ADMIN — INSULIN LISPRO 15 UNITS: 100 INJECTION, SOLUTION INTRAVENOUS; SUBCUTANEOUS at 08:25

## 2023-10-30 RX ADMIN — CHLORHEXIDINE GLUCONATE 15 ML: 1.2 SOLUTION ORAL at 08:24

## 2023-10-30 RX ADMIN — INSULIN LISPRO 15 UNITS: 100 INJECTION, SOLUTION INTRAVENOUS; SUBCUTANEOUS at 12:53

## 2023-10-30 NOTE — ASSESSMENT & PLAN NOTE
Pt p/w as stroke alert, s/p TNK at 1538 on 10/27/23. Had severe HA, word findings difficulty/aphasia   She had recent outpatient MRI on 10/26 that was negative for abnormal findings/atrophy   CTH negative, CTA without high grade stenosis of occlusion   Repeat CTH stable post TNKtPA  MRI pending (radiology to coordinate with inspire tech on Monday)   Echocardiogram pending   Routine EEG pending  Goal BP now normotensive   Appreciate neurology follow up   Restraints discontinued last evening. Discussed a trial of Seroquel with patient and .  states that she was started on this at one of her more recent admissions, but they stopped it secondary to diarrhea. Recommend outpatient geriatric evaluation and formal cognitive testing.

## 2023-10-30 NOTE — ASSESSMENT & PLAN NOTE
Lab Results   Component Value Date    EGFR 20 10/30/2023    EGFR 22 10/28/2023    EGFR 11 10/27/2023    CREATININE 2.33 (H) 10/30/2023    CREATININE 2.15 (H) 10/28/2023    CREATININE 2.33 (H) 10/27/2023   Baseline CR 2-2.16   Cr currently at [facility]

## 2023-10-30 NOTE — NURSING NOTE
Pt baseline alert with . Denies pain, no signs of distress. AVS reviewed with pt and spouse, notes made on AVS sheet. Either denies further question. Monitors and lines removed and no meds running   Personal items collected by pt and spouse.    Dc via wheelchair with pca

## 2023-10-30 NOTE — ASSESSMENT & PLAN NOTE
Lab Results   Component Value Date    HGBA1C 7.1 (H) 10/28/2023     Recent Labs     10/29/23  2055 10/30/23  0213 10/30/23  0701 10/30/23  1113   POCGLU 87 115 111 119       Blood Sugar Average: Last 72 hrs:  (P) 134  Home meds: levemir 50u qhs, novolog 18/18/25  Decrease levemir and decrease meal time insulin to 15u with meals   Continue SSI and accuchecks

## 2023-10-30 NOTE — ASSESSMENT & PLAN NOTE
Intermittent at home per   Tried Seroquel in the past, but developed diarrhea  Recommending geriatric referral as outpatient

## 2023-10-30 NOTE — ASSESSMENT & PLAN NOTE
Hx of inspire 11/10/2021, though noncompliant and device turned off  - discussed compliance with patient   Consider CPAP qHS while admitted

## 2023-10-30 NOTE — CASE MANAGEMENT
Case Management Discharge Planning Note    Patient name Anthony Melendez  Location S /S -01 MRN [id number]  : 1950 Date 10/30/2023       Current Admission Date: 10/27/2023  Current Admission Diagnosis:Stroke [facility])   Patient Active Problem List    Diagnosis Date Noted    Confusion 10/30/2023    Stroke ([address]) 10/27/2023    HTN (hypertension) 10/27/2023    HLD (hyperlipidemia) 10/27/2023    History of Nissen fundoplication     History of cholecystectomy 10/27/2023    SHAYAN (obstructive sleep apnea) 10/27/2023    History of recurrent UTIs 10/27/2023    Dyspnea 10/27/2023    CKD (chronic kidney disease) stage 4, GFR 15-29 ml/min ([address]) 10/27/2023    Hypothyroidism 10/27/2023    Type 2 diabetes mellitus ([address]) 10/27/2023      LOS (days): 3  Geometric Mean LOS (GMLOS) (days): 3.20  Days to GMLOS:0.2     OBJECTIVE:  Risk of Unplanned Readmission Score: 16.88         Current admission status: Inpatient   Preferred Pharmacy:   [address] [place] [address]  Phone: [phone] Fax: [phone]    Primary Care Provider: No primary care provider on file. Primary Insurance: MEDICARE  Secondary Insurance:     DISCHARGE DETAILS:    Discharge planning discussed with[de-identified] Patient  Freedom of Choice: Yes  Comments - Freedom of Choice: Discussed [facility] recommendations  CM contacted family/caregiver?: Yes  Were Treatment Team discharge recommendations reviewed with patient/caregiver?: Yes  Did patient/caregiver verbalize understanding of patient care needs?: Yes  Were patient/caregiver advised of the risks associated with not following Treatment Team discharge recommendations?: Yes    Contacts  Patient Contacts: Patient  Relationship to Patient[de-identified] Other (Comment)  Contact Method:  In Person  Reason/Outcome: Discharge Planning, Continuity of [facility]         Is the patient interested in [facility] at discharge?: Yes  Home Health Discipline requested[de-identified] Nursing, Occupational Therapy, Physical [place] Agency Name[de-identified] Ryan Provider[de-identified] PCP  Home Health Services Needed[de-identified] Evaluate Functional Status and Safety, Gait/ADL Training, Strengthening/Theraputic Exercises to Improve Function  Homebound Criteria Met[de-identified] Requires the Assistance of Another Person for Safe Ambulation or to Leave the Home  Supporting Clincal Findings[de-identified] Limited Endurance, Fatigues Easliy in Short Distances    DME Referral Provided  Referral made for DME?: No    Other Referral/Resources/Interventions Provided:  Interventions: [facility]         Treatment Team Recommendation: Home with [address]  Discharge Destination Plan[de-identified] Home with [address] [place] [address] at Discharge : Family                             IMM Given (Date):: 10/30/23  IMM Given to[de-identified] Patient        IMM reviewed with patient. Patient agrees with discharge determination. CM notified that patient is medically stable for DC home today with [facility]. CM met with patient at bedside to discuss the recommendation. Patient is agreeable to a blanket [facility] referral being made. CM followed back up with patient with her [facility] agency choices. Patient selected [facility]. Their contact information was added to the AVS.    Patient's  will be providing transportation home this evening. CM encouraged patient to follow up with all recommended appointments after discharge. Patient advised of importance for patient and family to participate in managing patient's medical well being.

## 2023-10-30 NOTE — ASSESSMENT & PLAN NOTE
Lab Results   Component Value Date    HGBA1C 7.1 (H) 10/28/2023     Recent Labs     10/29/23  2055 10/30/23  0213 10/30/23  0701 10/30/23  1113   POCGLU 87 115 111 119       Blood Sugar Average: Last 72 hrs:  (P) 134  Home meds: levemir 50u qhs, novolog 18/18/25  Decrease levemir and decrease meal time insulin to 15u with meals - while in hospital   Continue SSI and accuchecks

## 2023-10-30 NOTE — OCCUPATIONAL THERAPY NOTE
Occupational Therapy Evaluation     Patient Name: Constance Souza  RATVM'R Date: 10/30/2023  Problem List  Principal Problem:    Stroke [facility])  Active Problems:    HTN (hypertension)    HLD (hyperlipidemia)    SHAYAN (obstructive sleep apnea)    History of recurrent UTIs    CKD (chronic kidney disease) stage 4, GFR 15-29 ml/min ([facility])    Type 2 diabetes mellitus ([address])    Confusion    Past Medical History  History reviewed. No pertinent past medical history. Past Surgical History  History reviewed. No pertinent surgical history. 10/30/23 1054   OT Last Visit   OT Visit Date 10/30/23   Note Type   Note type Evaluation   Pain Assessment   Pain Assessment Tool 0-10   Pain Score 7   Pain Location/Orientation Location: Head   Restrictions/Precautions   Weight Bearing Precautions Per Order No   Other Precautions Bed Alarm; Chair Alarm; Fall Risk;Cognitive   Home Living   Type of [address] [place] [address] One level  (no LEYLA)   Bathroom Shower/Tub Walk-in shower   Bathroom Toilet Standard   Bathroom Accessibility [address]  (rollator)   Prior Function   Level of [place] Independent with ADLs; Independent with functional mobility; Needs assistance with IADLS  (was dressing herself however requires assistance per pt report)   Lives With Spouse; Son   Abimbola Vazquez Help From Family  (pt has hx of HHC/STR/OPPT and spouse provides transport to appointments)   IADLs Family/Friend/Other provides transportation; Family/Friend/Other provides meals; Family/Friend/Other provides medication management   Falls in the last 6 months 5 to 10  (6 per pt report)   Vocational Retired  (respiratory therapist)   Lifestyle   Autonomy pta pt (I) w ADLs however needed increased assistance, (A) for IADLs, (-) , 6 falls, lives with spouse and son   Reciprocal Relationships son and spouse   Service to Others retired   Intrinsic Gratification fish and play cards   General   Additional Pertinent History pta pt was (I) w ADLs and (A) for IADLs, (-) , hx of falls, lives with son and spouse   Family/Caregiver Present No   Additional General Comments Pt is a questionable historian and provided history is questionable   ADL   Where Assessed Chair   Eating Assistance 5  Supervision/Setup   Grooming Assistance 5  Supervision/Setup   UB Bathing Assistance 5  Supervision/Setup   LB Bathing Assistance 5  Supervision/Setup   UB Dressing Assistance 5  Supervision/Setup   LB Dressing Assistance 5  Supervision/Setup   LB Dressing Deficit Don/doff R sock; Don/doff L sock   Toileting Assistance  5  Supervision/Setup   Additional Comments Did not observe eating, grooming, UB bathing, LB bathing, UB dressing, and toileting at time of evaluation, with use of clinical reasoning, pt's performance throughout evaluation indicates that pt may be able to perform these tasks at the levels listed above   Bed Mobility   Supine to Sit 5  Supervision   Additional items Bedrails; Increased time required;Verbal cues   Sit to Supine Unable to assess   Additional Comments Pt OOB at end of IE OT eval   Transfers   Sit to Stand 5  Supervision   Additional items Impulsive;Verbal cues   Stand to Sit 5  Supervision   Additional items Impulsive;Verbal cues;Armrests   Functional Mobility   Functional Mobility 4  Minimal assistance   Additional Comments pt benefits from verbal cues for impulsivity and cognitive impairments   Additional items Rolling walker   Balance   Static Sitting Good   Dynamic Sitting Fair +   Static Standing Fair   Dynamic Standing Fair   Ambulatory Fair -   Activity Tolerance   Activity Tolerance Patient tolerated treatment well   Medical Staff Made Aware RN aware   Nurse Made Aware rn Aware   RUE Assessment   RUE Assessment WFL   LUE Assessment   LUE Assessment WFL   Psychosocial   Psychosocial (WDL) WDL   Cognition   Overall Cognitive Status Impaired   Arousal/Participation Alert   Attention Difficulty attending to directions   Orientation Level Oriented to person;Disoriented to time;Oriented to place; Disoriented to situation   Memory Decreased long term memory;Decreased short term memory;Decreased recall of precautions;Decreased recall of recent events   Following Commands Follows one step commands with increased time or repetition   Assessment   Limitation Decreased ADL status; Decreased cognition;Decreased Safe judgement during ADL;Decreased endurance;Decreased self-care trans;Decreased high-level ADLs   Prognosis Good   Assessment Pt is a 75 y.o. female seen for OT evaluation s/p admission to [facility] on 10/27/2023 due to headache. Diagnosed with Stroke [facility]). Personal and env factors supporting pt at time of IE include age, (I) PLOF, supportive family, attitude towards recovery, and accessible home environment. Personal and env factors inhibiting engagement in occupations include difficulty completing ADLs, difficulty completing IADLs, and impaired cognition . Performance deficits that affect the pt’s occupational performance can be seen above. Due to pt's current functional limitations and medical complications pt is functioning below baseline. Pt would benefit from continued skilled OT treatment in order to maximize safety, independence and overall performance with ADLs, IADLs, functional mobility, functional transfers, and cognition in order to achieve highest level of function. Goals   Patient Goals to go home   LTG Time Frame 10-14   Long Term Goal see below   Plan   Treatment Interventions ADL retraining;Functional transfer training; Endurance training;Patient/family training;Cognitive reorientation;Equipment evaluation/education; Compensatory technique education; Energy conservation; Activityengagement   Goal Expiration Date 11/13/23   Discharge Recommendation   Rehab Resource Intensity Level, OT III (Minimum Resource Intensity)  (cognitive therapy)   Additional Comments  The patient's raw score on the AM-PAC Daily Activity Inpatient Short Form is 20.  A raw score of greater than or equal to 19 suggests the patient may benefit from discharge to home. Please refer to the recommendation of the Occupational Therapist for safe discharge planning. AM-PAC Daily Activity Inpatient   Lower Body Dressing 3   Bathing 3   Toileting 3   Upper Body Dressing 3   Grooming 4   Eating 4   Daily Activity Raw Score 20   Daily Activity Standardized Score (Calc for Raw Score >=11) 42.03   AM-PAC Applied Cognition Inpatient   Following a Speech/Presentation 2   Understanding Ordinary Conversation 3   Taking Medications 2   Remembering Where Things Are Placed or Put Away 2   Remembering List of 4-5 Errands 2   Taking Care of Complicated Tasks 2   Applied Cognition Raw Score 13   Applied Cognition Standardized Score 30.46   End of Consult   Patient Position at End of Consult Bedside chair;Bed/Chair alarm activated; All needs within reach   Nurse Communication Nurse aware of consult     GOALS    Pt will improve activity tolerance to G for min 30 min txment sessions for increase engagement in functional tasks    Pt will complete bed mobility at a Mod I level w/ G balance/safety demonstrated to decrease caregiver assistance required     Pt will complete UB dressing/self care w/ mod I using adaptive device and DME as needed     Pt will complete LB dressing/self care w/ mod I using adaptive device and DME as needed    Pt will complete toileting w/ mod I w/ G hygiene/thoroughness using DME as needed    Pt will improve functional transfers to Mod I on/off all surfaces using DME as needed w/ G balance/safety     Pt will improve functional mobility during ADL/IADL/leisure tasks to Mod I using DME as needed w/ G balance/safety     Pt will be attentive 100% of the time during ongoing cognitive assessment w/ G participation to assist w/ safe d/c planning/recommendations    Pt will demonstrate G carryover of pt/caregiver education and training as appropriate w/o cues w/ good tolerance to increase safety during functional tasks    Pt will follow 100% simple one step verbal commands and be A/Ox4 consistently t/o use of external environmental cues w/ mod I    May Field, OTR/L

## 2023-10-30 NOTE — PROGRESS NOTES
[address] [facility]  Progress Note  Name: Vinicio Hoffman  MRN: [id number]  Unit/Bed#: S -01 I Date of Admission: 10/27/2023   Date of Service: 10/30/2023 I Hospital Day: 3    Assessment/Plan   * Stroke-like symptom  Assessment & Plan  Pt p/w as stroke alert, s/p TNK at 1538 on 10/27/23. Had severe HA, word findings difficulty/aphasia   She had recent outpatient MRI on 10/26 that was negative for abnormal findings/atrophy   CTH negative, CTA without high grade stenosis of occlusion   Repeat CTH stable post TNKtPA  MRI pending (radiology to coordinate with inspire tech on Monday)   Echocardiogram pending   Routine EEG pending  Goal BP now normotensive   Appreciate neurology follow up   Restraints discontinued last evening. Discussed a trial of Seroquel with patient and .  states that she was started on this at one of her more recent admissions, but they stopped it secondary to diarrhea. Recommend outpatient geriatric evaluation and formal cognitive testing.     Type 2 diabetes mellitus [facility])  Assessment & Plan  Lab Results   Component Value Date    HGBA1C 7.1 (H) 10/28/2023     Recent Labs     10/29/23  2055 10/30/23  0213 10/30/23  0701 10/30/23  1113   POCGLU 87 115 111 119       Blood Sugar Average: Last 72 hrs:  (P) 134  Home meds: levemir 50u qhs, novolog 18/18/25  Decrease levemir and decrease meal time insulin to 15u with meals   Continue SSI and accuchecks    CKD (chronic kidney disease) stage 4, GFR 15-29 ml/min [facility])  Assessment & Plan  Lab Results   Component Value Date    EGFR 20 10/30/2023    EGFR 22 10/28/2023    EGFR 11 10/27/2023    CREATININE 2.33 (H) 10/30/2023    CREATININE 2.15 (H) 10/28/2023    CREATININE 2.33 (H) 10/27/2023   Baseline CR 2-2.16   Cr currently at [facility]      History of recurrent UTIs  Assessment & Plan  Apparently on Keflex for 30 days through 11/11     HTN (hypertension)  Assessment & Plan  Home regimen: Losartan 25 mg once daily, Torsemide 5 mg once daily as needed if weight exceeds 172 pounds , Metoprolol tartrate 50 mg every 12 hours, Amlodipine 5 mg once daily  Required nicardipine drip for BP control s/p TNK in ICU   Goal BP now normotensive     HLD (hyperlipidemia)  Assessment & Plan  At home on crestor 5mg daily     SHAYAN (obstructive sleep apnea)  Assessment & Plan  Hx of inspire 11/10/2021, though noncompliant and device turned off  - discussed compliance with patient   Consider CPAP qHS while admitted              VTE Pharmacologic Prophylaxis:   High Risk (Score >/= 5) - Pharmacological DVT Prophylaxis Ordered: heparin. Sequential Compression Devices Ordered. Patient Centered Rounds: I performed bedside rounds with nursing staff today. Discussions with Specialists or Other Care Team Provider: case management    Education and Discussions with Family / Patient: Updated  () at bedside. Total Time Spent on Date of Encounter in care of patient: 35 mins. This time was spent on one or more of the following: performing physical exam; counseling and coordination of care; obtaining or reviewing history; documenting in the medical record; reviewing/ordering tests, medications or procedures; communicating with other healthcare professionals and discussing with patient's family/caregivers. Current Length of Stay: 3 day(s)  Current Patient Status: Inpatient   Certification Statement: The patient will continue to require additional inpatient hospital stay due to Neurologic workup  Discharge Plan: Anticipate discharge in 24-48 hrs to home. Code Status: Level 3 - DNAR and DNI    Subjective:   Nursing reports patient was confused last evening now off restraints. Patient sitting up in chair eating breakfast.  Confused at times during conversation.  at bedside.     Objective:     Vitals:   Temp (24hrs), Av.1 °F (36.7 °C), Min:97.4 °F (36.3 °C), Max:98.9 °F (37.2 °C)    Temp:  [97.4 °F (36.3 °C)-98.9 °F (37.2 °C)] 98.1 °F (36.7 °C)  HR:  [73-82] 73  Resp:  [16-18] 18  BP: (144-178)/(75-94) 149/75  SpO2:  [92 %-93 %] 93 %  Body mass index is 29.18 kg/m². Input and Output Summary (last 24 hours): Intake/Output Summary (Last 24 hours) at 10/30/2023 1248  Last data filed at 10/29/2023 1737  Gross per 24 hour   Intake 240 ml   Output --   Net 240 ml       Physical Exam:   Physical Exam  Vitals and nursing note reviewed. Constitutional:       General: She is not in acute distress. Appearance: She is well-developed. HENT:      Head: Normocephalic and atraumatic. Eyes:      Conjunctiva/sclera: Conjunctivae normal.   Cardiovascular:      Rate and Rhythm: Normal rate and regular rhythm. Heart sounds: No murmur heard. Pulmonary:      Effort: Pulmonary effort is normal. No respiratory distress. Breath sounds: Normal breath sounds. Abdominal:      Palpations: Abdomen is soft. Tenderness: There is no abdominal tenderness. Musculoskeletal:         General: No swelling. Cervical back: Neck supple. Skin:     General: Skin is warm and dry. Neurological:      General: No focal deficit present. Mental Status: She is alert. Psychiatric:         Mood and Affect: Mood normal.         Cognition and Memory: Cognition is impaired.          Additional Data:     Labs:  Results from last 7 days   Lab Units 10/30/23  0541 10/28/23  0404   WBC Thousand/uL 9.38 11.81*   HEMOGLOBIN g/dL 11.0* 11.6   HEMATOCRIT % 34.4* 35.9   PLATELETS Thousands/uL 256 262   NEUTROS PCT %  --  74   LYMPHS PCT %  --  18   MONOS PCT %  --  6   EOS PCT %  --  2     Results from last 7 days   Lab Units 10/30/23  0541   SODIUM mmol/L 138   POTASSIUM mmol/L 3.2*   CHLORIDE mmol/L 107   CO2 mmol/L 25   BUN mg/dL 21   CREATININE mg/dL 2.33*   ANION GAP mmol/L 6   CALCIUM mg/dL 8.2*   GLUCOSE RANDOM mg/dL 121     Results from last 7 days   Lab Units 10/27/23  1515   INR  0.84     Results from last 7 days   Lab Units 10/30/23  1113 10/30/23  0701 10/30/23  0213 10/29/23  2055 10/29/23  1558 10/29/23  1112 10/29/23  0625 10/28/23  2114 10/28/23  1843 10/28/23  1548 10/28/23  1237 10/28/23  0558   POC GLUCOSE mg/dl 119 111 115 87 172* 79 125 161* 227* 132 146* 103     Results from last 7 days   Lab Units 10/28/23  0404   HEMOGLOBIN A1C % 7.1*           Lines/Drains:  Invasive Devices       Peripheral Intravenous Line  Duration             Peripheral IV 10/29/23 Left;Ventral (anterior) Forearm 1 day                      Telemetry:  Telemetry Orders (From admission, onward)               24 Hour Telemetry Monitoring  Continuous x 24 Hours (Telem)           Question:  Reason for 24 Hour Telemetry  Answer:  TIA/Suspected CVA/ Confirmed CVA                     Telemetry Reviewed: Normal Sinus Rhythm  Indication for Continued Telemetry Use: Acute CVA             Imaging: Reviewed radiology reports from this admission including: CT head    Recent Cultures (last 7 days):         Last 24 Hours Medication List:   Current Facility-Administered Medications   Medication Dose Route Frequency Provider Last Rate    amLODIPine  5 mg Oral Daily Ruthine Spice, CRNP      aspirin  81 mg Oral Daily Elinafaby Zambrano, DO      cephalexin  250 mg Oral HS Ruthine Spice, CRNP      chlorhexidine  15 mL Mouth/Throat Q12H 1110 Tatiana Vasques, RAMIRO      cholecalciferol  1,000 Units Oral Daily Ruthine Spice, CRNP      cyanocobalamin  1,000 mcg Intramuscular Daily Mando Black,       DULoxetine  30 mg Oral Daily Ruthine Spice, CRNP      ferrous sulfate  325 mg Oral Daily With Breakfast Ruthine Spice, CRNP      gabapentin  100 mg Oral HS Ruthine Spice, CRNP      heparin (porcine)  5,000 Units Subcutaneous [facility] [address] Ruthine Juanito, CRNP      hydrALAZINE  5 mg Intravenous Q6H PRN Ruthine Spice, CRNP      insulin detemir  40 Units Subcutaneous HS Sonia Chen PA-C      insulin lispro  1-6 Units Subcutaneous TID AC Ruthine Ritoce, CRNP      insulin lispro  15 Units Subcutaneous TID With Meals Alec Chavez Semaj, CRALISIA      levothyroxine  125 mcg Oral Early Morning Nakia Ahr, CRNP      losartan  25 mg Oral Daily Nakia Ahr, CRNP      metoprolol tartrate  50 mg Oral Q12H 1110 Tatiana Vasques, CRNP      pantoprazole  40 mg Oral BID AC Fern Blackwell MD      pramipexole  0.25 mg Oral HS Nakia Ahr, CRNP      pravastatin  40 mg Oral Daily With [facility], CRNP      QUEtiapine  12.5 mg Oral HS PRN Emmanuel Levine PA-C          Today, Patient Was Seen By: Susan Baca PA-C    **Please Note: This note may have been constructed using a voice recognition system. **

## 2023-10-30 NOTE — ASSESSMENT & PLAN NOTE
Hx of inspire 11/10/2021, though noncompliant and device turned off  - discussed compliance with patient

## 2023-10-30 NOTE — ASSESSMENT & PLAN NOTE
Lab Results   Component Value Date    EGFR 20 10/30/2023    EGFR 22 10/28/2023    EGFR 11 10/27/2023    CREATININE 2.33 (H) 10/30/2023    CREATININE 2.15 (H) 10/28/2023    CREATININE 2.33 (H) 10/27/2023   Baseline CR 2-2.16   Cr currently at [facility]

## 2023-10-30 NOTE — PROGRESS NOTES
The pantoprazole has / have been converted to Oral per [facility] IV-to-PO Auto-Conversion Protocol for Adults as approved by the Pharmacy and Therapeutics Committee. The patient met all eligible criteria:  3 Age = 25years old   2) Received at least one dose of the IV form   3) Receiving at least one other scheduled oral/enteral medication   4) Tolerating an oral/enteral diet   and did not have any exclusions:   1) Critical care patient   2) Active GI bleed (IF assessing H2RAs or PPIs)   3) Continuous tube feeding (IF assessing cipro, doxycycline, levofloxacin, minocycline, rifampin, or voriconazole)   4) Receiving PO vancomycin (IF assessing metronidazole)   5) Persistent nausea and/or vomiting   6) Ileus or gastrointestinal obstruction   7) Jeffrey/nasogastric tube set for continuous suction   8) Specific order not to automatically convert to PO (in the order's comments or if discussed in the most recent Infectious Disease or primary team's progress notes).

## 2023-10-30 NOTE — DISCHARGE SUMMARY
[address]  Discharge- Angélica Mart 1950, 75 y.o. female MRN: [id number]  Unit/Bed#: S -01 Encounter: [id number]  Primary Care Provider: No primary care provider on file. Date and time admitted to hospital: 10/27/2023  3:10 PM    * Stroke [facility])  Assessment & Plan  Pt p/w as stroke alert, s/p TNK at [facility] on 10/27/23. Had severe HA, word findings difficulty/aphasia   She had recent outpatient MRI on 10/26 that was negative for abnormal findings/atrophy   CTH negative, CTA without high grade stenosis of occlusion   Repeat CTH stable post TNKtPA  MRI - right putamen CVA   Echocardiogram - normal EF, G1DD   Goal BP now normotensive   Appreciate neurology follow up  - ASA and Plavix x 21 days, then ASA thereafter. Zio patch for 2 weeks  No need for EEG  Recommend outpatient geriatric evaluation and formal cognitive testing given episodes of confusion/behavior disturbance at home. Tried Seroquel at previous admission, but caused diarrhea.   VNA for PT/OT    Type 2 diabetes mellitus [facility])  Assessment & Plan  Lab Results   Component Value Date    HGBA1C 7.1 (H) 10/28/2023     Recent Labs     10/29/23  2055 10/30/23  0213 10/30/23  0701 10/30/23  1113   POCGLU 87 115 111 119       Blood Sugar Average: Last 72 hrs:  (P) 134  Home meds: levemir 50u qhs, novolog 18/18/25  Decrease levemir and decrease meal time insulin to 15u with meals - while in hospital   Continue SSI and accuchecks    CKD (chronic kidney disease) stage 4, GFR 15-29 ml/min [facility])  Assessment & Plan  Lab Results   Component Value Date    EGFR 20 10/30/2023    EGFR 22 10/28/2023    EGFR 11 10/27/2023    CREATININE 2.33 (H) 10/30/2023    CREATININE 2.15 (H) 10/28/2023    CREATININE 2.33 (H) 10/27/2023   Baseline CR 2-2.16   Cr currently at [facility]      History of recurrent UTIs  Assessment & Plan  Apparently on Keflex for 30 days through 11/11     HTN (hypertension)  Assessment & Plan  Home regimen: Losartan 25 mg once daily, Torsemide 5 mg once daily as needed if weight exceeds 172 pounds , Metoprolol tartrate 50 mg every 12 hours, Amlodipine 5 mg once daily  Required nicardipine drip for BP control s/p TNK in ICU   Goal BP now normotensive     HLD (hyperlipidemia)  Assessment & Plan  At home on crestor 5mg daily     SHAYAN (obstructive sleep apnea)  Assessment & Plan  Hx of inspire 11/10/2021, though noncompliant and device turned off  - discussed compliance with patient       Confusion  Assessment & Plan  Intermittent at home per   Tried Seroquel in the past, but developed diarrhea  Recommending geriatric referral as outpatient      Medical Problems       Resolved Problems  Date Reviewed: 10/30/2023   None       Discharging Physician / Practitioner: Thuy Spicer PA-C  PCP: No primary care provider on file. Admission Date:   Admission Orders (From admission, onward)       Ordered        10/27/23 [address] [place]  Once                          Discharge Date: 10/30/23    Consultations During Hospital Stay:  Dr. Jemma Vasquez    Procedures Performed:     MRI brain  Foci of late acute infarct involving the right putamen. Moderate microangiopathic changes    CT head wo contrast  No acute intracranial abnormality. Stable chronic microangiopathic changes within the brain. CTA head/neck  No evidence for high-grade stenosis, focal occlusion or vascular aneurysm of the cervical or intracranial vessels     CT brain  No mass effect, acute intracranial hemorrhage or evidence of recent infarction. Mild to moderate chronic microvascular ischemic change. Echo    Left Ventricle: Left ventricular cavity size is normal. Wall thickness is normal. The left ventricular ejection fraction is 55%. Systolic function is normal. Wall motion is normal. Diastolic function is mildly abnormal, consistent with grade I (abnormal) relaxation. Mitral Valve: There is mild regurgitation. Tricuspid Valve:  There is mild regurgitation. Significant Findings / Test Results:   See above    Incidental Findings:   none     Test Results Pending at Discharge (will require follow up):   none     Outpatient Tests Requested:  Zio patch    Complications:  none    Reason for Admission: mental status change    Hospital Course:   Shivam Lindquist is a 75 y.o. female patient who originally presented to the hospital on 10/27/2023 due to mental status change. Was noted to have a severe headache, then developed difficulty forming words and became aphasic. Stroke alert called and patient received TNKtPA with improvement of her symptoms. She was found to have a right putamen stroke on MRI. Neurology recommended aspirin and Plavix for 21 days, then aspirin 81 mg daily following. They recommended a Zio patch as an outpatient. Patient was also having episodes of confusion prior to this event. She had Seroquel in the past, but developed diarrhea. Recommending a geriatric evaluation as an outpatient. Patient was evaluated by PT/OT and will go home with home health care. Please see above list of diagnoses and related plan for additional information. Condition at Discharge: good    Discharge Day Visit / Exam:   * Please refer to separate progress note for these details *    Discussion with Family: Updated  () via phone. Discharge instructions/Information to patient and family:   See after visit summary for information provided to patient and family. Provisions for Follow-Up Care:  See after visit summary for information related to follow-up care and any pertinent home health orders. Disposition:   Home with VNA Services (Reminder: Complete face to face encounter)    Planned Readmission: none     Discharge Statement:  I spent 45 minutes discharging the patient. This time was spent on the day of discharge. I had direct contact with the patient on the day of discharge.  Greater than 50% of the total time was spent examining patient, answering all patient questions, arranging and discussing plan of care with patient as well as directly providing post-discharge instructions. Additional time then spent on discharge activities. Discharge Medications:  See after visit summary for reconciled discharge medications provided to patient and/or family.       **Please Note: This note may have been constructed using a voice recognition system**

## 2023-10-30 NOTE — ASSESSMENT & PLAN NOTE
Pt p/w as stroke alert, s/p TNK at 1538 on 10/27/23. Had severe HA, word findings difficulty/aphasia   She had recent outpatient MRI on 10/26 that was negative for abnormal findings/atrophy   CTH negative, CTA without high grade stenosis of occlusion   Repeat CTH stable post TNKtPA  MRI - right putamen CVA   Echocardiogram - normal EF, G1DD   Goal BP now normotensive   Appreciate neurology follow up  - ASA and Plavix x 21 days, then ASA thereafter. Zio patch for 2 weeks  No need for EEG  Recommend outpatient geriatric evaluation and formal cognitive testing given episodes of confusion/behavior disturbance at home. Tried Seroquel at previous admission, but caused diarrhea.   VNA for PT/OT

## 2023-10-30 NOTE — PROGRESS NOTES
Patient:    MRN:  [id number]    Aidin Request ID:  [id number]    Level of care reserved:  Angelito Yogesh Graham    Partner Reserved:  [facility], [facility], [address] [facility] [phone]    Clinical needs requested:    Geography searched:  [id number]    Start of Service:    Request sent:  2:35pm EDT on 10/30/2023 by Eron Leyva    Partner reserved:  3:24pm EDT on 10/30/2023 by Eron Leyva    Choice list shared:  3:15pm EDT on 10/30/2023 by Eron Leyva

## 2023-10-30 NOTE — PROGRESS NOTES
NEUROLOGY RESIDENCY PROGRESS NOTE     Name: Constance Souza   Age & Sex: 75 y.o. female   MRN: [id number]  Unit/Bed#: S -01   Encounter: [id number]      Patient follow-up outpatient with Dr. Bach Been in 6 to 8 weeks    ASSESSMENT & PLAN     * Stroke [facility])  Assessment & Plan  67yo F w/ a PMH of HTN, HLD, SHAYAN on inspire device (off since not using),  peripheral neuropathy, memory loss, past UTIs where she has had confusion ( notes confusion for the past 1 month), DM, firbomylagia, depression, ambulatory dysfunction(uses walker)  Presented as stroke alert on 10/27/2023  3:10 PM with initial NIHSS of 14 and LKW 2:15pm 10/27/2023 , initial Blood Pressure: (!) 185/92. Initial presenting deficits were headache then progressed to aphasia and being mute. Pt was found to be a thrombolysis candidate within the appropriate time window and without obvious contraindication and TNK was given. Of note patient has a baseline of chronic confusion over the past 1 month but patient was able to appropriately answer questions and follow commands throughout most of the month and including this morning during which time until the event around 2 PM when patient was noted to be having a severe headache.    does note that patient does have some memory issues that have been ongoing  Been getting worked up as patient has seen outpatient neurology earlier in the month and just had an MRI done 10/26/2023 that did not show any acute abnormal findings/atrophy    Post thrombolysis exam 10/30: patient oriented and able to answer questions appropriately but wasn't sure about why she was in hospital   Current Blood Pressure: (!) 186/88, BP over last 24 hours: BP  Min: 136/87  Max: 191/91   Vascular risk factors: HTN, HLD, SHAYAN  Home meds: no ac/ap     Workup:  Lab Results   Component Value Date    HGBA1C 7.1 (H) 10/28/2023    CHOLESTEROL 121 10/28/2023    LDLCALC 4 10/28/2023    TRIG 371 (H) 10/28/2023    INR 0.84 10/27/2023 CTH: No mass effect, acute intracranial hemorrhage or evidence of recent infarction. Mild to moderate chronic microvascular ischemic change   CTA: No evidence for high-grade stenosis, focal occlusion or vascular aneurysm of the cervical or intracranial vessels. : No evidence for high-grade stenosis, focal occlusion or vascular aneurysm of the cervical or intracranial vessels   24 hr CTH s/p TNK: No acute intracranial abnormality. Stable chronic microangiopathic changes within the brain   MRI: There is no discrete mass, mass effect or midline shift. There is no intracranial hemorrhage. Foci of restricted diffusion in right putamen with mild low ADC signal consistent with infarct. Small scattered hyperintensities on T2/FLAIR imaging are noted in the periventricular and subcortical white matter demonstrating an appearance that is statistically most likely to represent moderate microangiopathic change. Repeat CTH at 24 hours: No acute intracranial abnormality. Stable chronic microangiopathic changes within the brain. Echocardiogram: ejection fraction is 55%. Systolic function is normal. Wall motion is normal. Diastolic function is mildly abnormal, consistent with grade I (abnormal) relaxation.  L atrium normal in size  Telemetry: no events      Vit B12 234   10/03/2023 TSH 2.39, free T4 0.92 (T4 WNL)    Pertinent scores:  - NIHSS: 14 (at initial stroke alert)   Stroke Modified Izzy Score: 4 (Moderately severe disability; unable to walk and attend to bodily needs without assistance)    Impression: R putamen stroke concern for possible cardio-embolic cause and will do further w/u outpatient w/ zio patch vs Hypertensive stroke from noted pain w/ headache     Plan:  - Stroke pathway  - Discussed plan with neurology attending, Dr. Cristina Lewis  - normotension for BP  - atorvastatin 40mg qhs  - continue DAPT ASA 81mg and plavix 75mg for 21 days and then ASA 81mg afterwards  - Maintain glucose <180, SSI for coverage if indicated  - Medical management as per primary team appreciated  - recommend zio patch for at least 2 weeks   - repelte vit b12 w/ B12 1000mcg injection daily for 5 days then weekly   - f/u w/ PCP  - DVT ppx held for 24 hours due to thrombolysis administration  - Monitor on telemetry  - PT/OT/Speech/PM&R input appreciated  - Stroke education  - No other further recommendations from the inpatient Neurology perspective. - Please contact Neurology any further questions. Confusion  Assessment & Plan  Likely MCI   TSH WNL   Low B12 and repleting   Will get MMA and Anti-parietal cell AB     Should get outpatient memory w/u and likely neuropsych testing    HLD (hyperlipidemia)  Assessment & Plan  Lipitor 40mg    HTN (hypertension)  Assessment & Plan  Allow for normotension              SUBJECTIVE     Patient was seen and examined. No acute events overnight. No acute concerns. She mentioned a small headache that resolved on its own      SHe denies any syncope, paresthesias, diplopia, visual changes, tinnitus, sudden onset weakness, garbled speech, dysarthria/slurring of words,  Loss of bowel or bladder        Review of Systems    OBJECTIVE     Patient ID: Annie Alarcon is a 75 y.o. female. Vitals:    10/30/23 0217 10/30/23 0600 10/30/23 0701 10/30/23 0930   BP: 153/91  149/75 149/75   BP Location:       Pulse:    73   Resp:   18    Temp:   98.1 °F (36.7 °C)    TempSrc:       SpO2:       Weight:  77.4 kg (170 lb 10.2 oz)  77.1 kg (170 lb)   Height:    5' 4" (1.626 m)      Temperature:   Temp (24hrs), Av.1 °F (36.7 °C), Min:97.4 °F (36.3 °C), Max:98.9 °F (37.2 °C)    Temperature: 98.1 °F (36.7 °C)      Physical Exam  Eyes:      Extraocular Movements: EOM normal.      Pupils: Pupils are equal, round, and reactive to light.    Neurological:      Motor: Motor strength is normal.     Coordination: Finger-Nose-Finger Test and Heel to Shin Test normal.      Deep Tendon Reflexes:      Reflex Scores:       Tricep reflexes are 1+ on the right side and 1+ on the left side. Bicep reflexes are 1+ on the right side and 1+ on the left side. Brachioradialis reflexes are 1+ on the right side and 1+ on the left side. Patellar reflexes are 1+ on the right side and 1+ on the left side. Achilles reflexes are 1+ on the right side and 1+ on the left side. Psychiatric:         Speech: Speech normal.          Neurologic Exam     Mental Status   Speech: speech is normal   Able to name object. Abnormal comprehension. Oriented to person and place   Got date wrong   Got president and       Cranial Nerves     CN II   Visual fields full to confrontation. CN III, IV, VI   Pupils are equal, round, and reactive to light. Extraocular motions are normal.   CN III: no CN III palsy  CN VI: no CN VI palsy  Nystagmus: none     CN V   Facial sensation intact. CN VII   Facial expression full, symmetric. CN VIII   CN VIII normal.     CN IX, X   CN IX normal.   CN X normal.     CN XI   CN XI normal.     CN XII   CN XII normal.     Motor Exam   Right arm pronator drift: absent  Left arm pronator drift: absent    Strength   Strength 5/5 throughout. Sensory Exam   Light touch normal.     Gait, Coordination, and Reflexes     Coordination   Finger to nose coordination: normal  Heel to shin coordination: normal    Tremor   Resting tremor: absent  Intention tremor: absent  Action tremor: absent    Reflexes   Right brachioradialis: 1+  Left brachioradialis: 1+  Right biceps: 1+  Left biceps: 1+  Right triceps: 1+  Left triceps: 1+  Right patellar: 1+  Left patellar: 1+  Right achilles: 1+  Left achilles: 1+  Right plantar: normal  Left plantar: normal           LABORATORY DATA     Labs: I have personally reviewed pertinent reports.    and I have personally reviewed pertinent films in PACS  Results from last 7 days   Lab Units 10/30/23  0541 10/28/23  0404 10/27/23  1515   WBC Thousand/uL 9.38 11.81* 11.46*   HEMOGLOBIN g/dL 11.0* 11.6 11.9   HEMATOCRIT % 34.4* 35.9 37.3   PLATELETS Thousands/uL 256 262 282   NEUTROS PCT %  --  74  --    MONOS PCT %  --  6  --    EOS PCT %  --  2  --       Results from last 7 days   Lab Units 10/30/23  0541 10/28/23  0404 10/27/23  1515   SODIUM mmol/L 138 138 137   POTASSIUM mmol/L 3.2* 3.3* 3.9   CHLORIDE mmol/L 107 105 103   CO2 mmol/L 25 26 26   BUN mg/dL 21 24 29*   CREATININE mg/dL 2.33* 2.15* 2.33*   CALCIUM mg/dL 8.2* 8.8 9.5              Results from last 7 days   Lab Units 10/27/23  1515   INR  0.84   PTT seconds 27               IMAGING & DIAGNOSTIC TESTING     Radiology Results: I have personally reviewed pertinent reports. and I have personally reviewed pertinent films in PACS    MRI brain wo contrast   Final Result by Kiersten Morel MD (10/30 1345)      Foci of late acute infarct involving the right putamen. Moderate microangiopathic changes. The study was marked in [facility] for immediate notification. Workstation performed: [id number]         CT head wo contrast   Final Result by Victor M Sanchez MD (10/28 1654)      No acute intracranial abnormality. Stable chronic microangiopathic changes within the brain. Workstation performed: [id number]         CTA stroke alert (head/neck)   Final Result by Magda Davenport MD (10/27 1536)      No evidence for high-grade stenosis, focal occlusion or vascular aneurysm of the cervical or intracranial vessels               Findings were directly discussed with Elina Zambrano  at 3:22 p.m. Workstation performed: [id number]         CT stroke alert brain   Final Result by Magda Davenport MD (10/27 1532)      No mass effect, acute intracranial hemorrhage or evidence of recent infarction. Mild to moderate chronic microvascular ischemic change. Findings were directly discussed with Elina Zambrano  at 3:22 p.m.       Workstation performed: [id number]             Other Diagnostic Testing: I have personally reviewed pertinent reports.    and I have personally reviewed pertinent films in PACS    ACTIVE MEDICATIONS     Current Facility-Administered Medications   Medication Dose Route Frequency    amLODIPine (NORVASC) tablet 5 mg  5 mg Oral Daily    aspirin (ECOTRIN LOW STRENGTH) EC tablet 81 mg  81 mg Oral Daily    cephalexin (KEFLEX) capsule 250 mg  250 mg Oral HS    chlorhexidine (PERIDEX) 0.12 % oral rinse 15 mL  15 mL Mouth/Throat Q12H ELISHA    cholecalciferol (VITAMIN D3) tablet 1,000 Units  1,000 Units Oral Daily    cyanocobalamin injection 1,000 mcg  1,000 mcg Intramuscular Daily    DULoxetine (CYMBALTA) delayed release capsule 30 mg  30 mg Oral Daily    ferrous sulfate tablet 325 mg  325 mg Oral Daily With Breakfast    gabapentin (NEURONTIN) capsule 100 mg  100 mg Oral HS    heparin (porcine) subcutaneous injection 5,000 Units  5,000 Units Subcutaneous Q8H [address]    hydrALAZINE (APRESOLINE) injection 5 mg  5 mg Intravenous Q6H PRN    insulin detemir (LEVEMIR) subcutaneous injection 40 Units  40 Units Subcutaneous HS    insulin lispro (HumaLOG) 100 units/mL subcutaneous injection 1-6 Units  1-6 Units Subcutaneous TID AC    insulin lispro (HumaLOG) 100 units/mL subcutaneous injection 15 Units  15 Units Subcutaneous TID With Meals    levothyroxine tablet 125 mcg  125 mcg Oral Early Morning    losartan (COZAAR) tablet 25 mg  25 mg Oral Daily    metoprolol tartrate (LOPRESSOR) tablet 50 mg  50 mg Oral Q12H ELISHA    pantoprazole (PROTONIX) EC tablet 40 mg  40 mg Oral BID AC    pramipexole (MIRAPEX) tablet 0.25 mg  0.25 mg Oral HS    pravastatin (PRAVACHOL) tablet 40 mg  40 mg Oral Daily With Dinner    QUEtiapine (SEROquel) tablet 12.5 mg  12.5 mg Oral HS PRN       Prior to Admission medications    Not on File         VTE Pharmacologic Prophylaxis: hold off until [facility] shows no acute bleeds   VTE Mechanical Prophylaxis: sequential compression device    ==  James Patricia DO   [facility] Neurology Residency, PGY-3

## 2023-10-30 NOTE — PLAN OF CARE
Problem: OCCUPATIONAL THERAPY ADULT  Goal: Performs self-care activities at highest level of function for planned discharge setting. See evaluation for individualized goals. Description: Treatment Interventions: ADL retraining, Functional transfer training, Endurance training, Patient/family training, Cognitive reorientation, Equipment evaluation/education, Compensatory technique education, Energy conservation, Activityengagement          See flowsheet documentation for full assessment, interventions and recommendations. Outcome: Progressing  Note: Limitation: Decreased ADL status, Decreased cognition, Decreased Safe judgement during ADL, Decreased endurance, Decreased self-care trans, Decreased high-level ADLs  Prognosis: Good  Assessment: Pt is a 75 y.o. female seen for OT evaluation s/p [facility] on 10/27/2023 due to headache. Diagnosed with Stroke [facility]). Personal and env factors supporting pt at time of IE include age, (I) PLOF, supportive family, attitude towards recovery, and accessible home environment. Personal and env factors inhibiting engagement in occupations include difficulty completing ADLs, difficulty completing IADLs, and impaired cognition . Performance deficits that affect the pt’s occupational performance can be seen above. Due to pt's current functional limitations and medical complications pt is functioning below baseline. Pt would benefit from continued skilled OT treatment in order to maximize safety, independence and overall performance with ADLs, IADLs, functional mobility, functional transfers, and cognition in order to achieve highest level of function.      Rehab Resource Intensity Level, OT: III (Minimum Resource Intensity) (cognitive therapy)

## 2023-10-30 NOTE — ASSESSMENT & PLAN NOTE
Likely MCI   TSH WNL   Low B12 and repleting   Will get MMA and Anti-parietal cell AB     Should get outpatient memory w/u and likely neuropsych testing

## 2023-10-31 ENCOUNTER — OFFICE VISIT (OUTPATIENT)
Dept: INTERNAL MEDICINE CLINIC | Facility: CLINIC | Age: 73
End: 2023-10-31
Payer: MEDICARE

## 2023-10-31 ENCOUNTER — HOME CARE VISIT (OUTPATIENT)
Dept: HOME HEALTH SERVICES | Facility: HOME HEALTHCARE | Age: 73
End: 2023-10-31

## 2023-10-31 VITALS
SYSTOLIC BLOOD PRESSURE: 140 MMHG | HEART RATE: 66 BPM | TEMPERATURE: 98.8 F | RESPIRATION RATE: 16 BRPM | WEIGHT: 167 LBS | OXYGEN SATURATION: 94 % | BODY MASS INDEX: 30.06 KG/M2 | DIASTOLIC BLOOD PRESSURE: 72 MMHG

## 2023-10-31 DIAGNOSIS — I63.9 CEREBROVASCULAR ACCIDENT (CVA), UNSPECIFIED MECHANISM (HCC): Primary | ICD-10-CM

## 2023-10-31 DIAGNOSIS — R41.3 MEMORY CHANGES: ICD-10-CM

## 2023-10-31 DIAGNOSIS — E53.8 B12 DEFICIENCY: ICD-10-CM

## 2023-10-31 PROBLEM — R41.82 AMS (ALTERED MENTAL STATUS): Status: RESOLVED | Noted: 2020-07-14 | Resolved: 2023-10-31

## 2023-10-31 PROBLEM — R26.2 AMBULATORY DYSFUNCTION: Status: RESOLVED | Noted: 2020-10-22 | Resolved: 2023-10-31

## 2023-10-31 LAB — PCA AB SER-ACNC: 1.8 UNITS (ref 0–20)

## 2023-10-31 PROCEDURE — 96372 THER/PROPH/DIAG INJ SC/IM: CPT | Performed by: INTERNAL MEDICINE

## 2023-10-31 PROCEDURE — 99214 OFFICE O/P EST MOD 30 MIN: CPT | Performed by: INTERNAL MEDICINE

## 2023-10-31 RX ORDER — CYANOCOBALAMIN 1000 UG/ML
1000 INJECTION, SOLUTION INTRAMUSCULAR; SUBCUTANEOUS
Status: DISCONTINUED | OUTPATIENT
Start: 2023-10-31 | End: 2023-11-13

## 2023-10-31 RX ORDER — CLOPIDOGREL BISULFATE 75 MG/1
75 TABLET ORAL DAILY
COMMUNITY
End: 2023-11-09

## 2023-10-31 RX ADMIN — CYANOCOBALAMIN 1000 MCG: 1000 INJECTION, SOLUTION INTRAMUSCULAR; SUBCUTANEOUS at 10:53

## 2023-10-31 NOTE — ASSESSMENT & PLAN NOTE
Recommended formal neuropsych eval  as well as geriatrician consultation due to fluctuating changes while in the hospital.   Will help schedule earl and follow up.

## 2023-10-31 NOTE — PROGRESS NOTES
Assessment/Plan:    B12 deficiency  Continue B12 monthly, pending lab work. Cerebrovascular accident (CVA) [facility])  S/p thrombolysis, no residual defect. Continue plavix and aspirin for 21 days then aspirin lifelong. Continue statin. Follow up with neurology. Memory changes  Recommended formal neuropsych eval  as well as geriatrician consultation due to fluctuating changes while in the hospital.   Will help schedule earl and follow up. Diagnoses and all orders for this visit:    Cerebrovascular accident (CVA), unspecified mechanism ([address])    B12 deficiency  -     cyanocobalamin injection 1,000 mcg    Memory changes    Other orders  -     clopidogrel (PLAVIX) 75 mg tablet; Take 75 mg by mouth daily Once daily for 21 days          Subjective:      Patient ID: Rosa Person is a 75 y.o. female. Patient presents in the office for a f/u visit with her , after admission in the hospital due to CVA on 10/27/2023. She presented with headache and slurred speech. Neurology was consulted. She received thrombolysis and has no residual defect. She was started on aspirin and plavix. She was recommended to be evaluated by geriatrician and neuropsychology due to worsening memory changes. Continue to have memory changes, confusion and behavioral changes at home per  that is progressing. She had recent eval by neurology no significant abnormality seen. She was also recommended f/u with cardiology for further evaluation of arrhythmias. She states she feels well. She denies weakness, facial droop or change in speech. She did not start plavix or aspirin yet since she was d/c yesterday, she will start them today. She is due for vitamin B12 shot today.          The following portions of the patient's history were reviewed and updated as appropriate: allergies, current medications, past family history, past medical history, past social history, past surgical history, and problem list.    Review of Systems Constitutional:  Negative for appetite change and fatigue. Eyes:  Negative for visual disturbance. Respiratory:  Negative for cough, chest tightness, shortness of breath and wheezing. Cardiovascular:  Negative for chest pain, palpitations and leg swelling. Gastrointestinal:  Negative for abdominal pain, nausea and vomiting. Genitourinary:  Negative for difficulty urinating and frequency. Musculoskeletal:  Negative for arthralgias and joint swelling. Skin:  Negative for rash. Neurological:  Negative for dizziness, speech difficulty, weakness and headaches. Psychiatric/Behavioral:  Positive for dysphoric mood. Negative for self-injury. Objective:      /72 (BP Location: Left arm, Patient Position: Sitting, Cuff Size: Adult)   Pulse 66   Temp 98.8 °F (37.1 °C) (Tympanic)   Resp 16   Wt 75.8 kg (167 lb)   SpO2 94%   BMI 30.06 kg/m²          Physical Exam  Vitals and nursing note reviewed. Constitutional:       General: She is not in acute distress. Appearance: She is well-developed. She is obese. HENT:      Head: Normocephalic and atraumatic. Eyes:      Conjunctiva/sclera: Conjunctivae normal.      Pupils: Pupils are equal, round, and reactive to light. Neck:      Thyroid: No thyromegaly. Cardiovascular:      Rate and Rhythm: Normal rate and regular rhythm. Heart sounds: Normal heart sounds. Pulmonary:      Effort: No respiratory distress. Breath sounds: No wheezing. Abdominal:      General: There is no distension. Tenderness: There is no abdominal tenderness. Musculoskeletal:         General: Normal range of motion. Cervical back: Normal range of motion and neck supple. Skin:     General: Skin is warm and dry. Capillary Refill: Capillary refill takes less than 2 seconds. Coloration: Skin is pale. Neurological:      General: No focal deficit present. Mental Status: She is alert and oriented to person, place, and time. Sensory: No sensory deficit. Motor: No weakness or abnormal muscle tone. Psychiatric:         Thought Content:  Thought content normal.         Judgment: Judgment normal.

## 2023-10-31 NOTE — ASSESSMENT & PLAN NOTE
S/p thrombolysis, no residual defect. Continue plavix and aspirin for 21 days then aspirin lifelong. Continue statin. Follow up with neurology.

## 2023-11-01 ENCOUNTER — APPOINTMENT (EMERGENCY)
Dept: CT IMAGING | Facility: HOSPITAL | Age: 73
End: 2023-11-01
Payer: MEDICARE

## 2023-11-01 ENCOUNTER — HOME CARE VISIT (OUTPATIENT)
Dept: HOME HEALTH SERVICES | Facility: HOME HEALTHCARE | Age: 73
End: 2023-11-01

## 2023-11-01 ENCOUNTER — EVALUATION (OUTPATIENT)
Facility: CLINIC | Age: 73
End: 2023-11-01
Payer: MEDICARE

## 2023-11-01 ENCOUNTER — HOSPITAL ENCOUNTER (EMERGENCY)
Facility: HOSPITAL | Age: 73
Discharge: HOME | End: 2023-11-01
Attending: EMERGENCY MEDICINE
Payer: MEDICARE

## 2023-11-01 VITALS
RESPIRATION RATE: 17 BRPM | DIASTOLIC BLOOD PRESSURE: 86 MMHG | HEART RATE: 85 BPM | TEMPERATURE: 98.2 F | BODY MASS INDEX: 28.67 KG/M2 | WEIGHT: 167 LBS | OXYGEN SATURATION: 96 % | SYSTOLIC BLOOD PRESSURE: 179 MMHG

## 2023-11-01 DIAGNOSIS — R41.3 MEMORY DIFFICULTIES: Primary | ICD-10-CM

## 2023-11-01 DIAGNOSIS — G43.909 MIGRAINE HEADACHE: Primary | ICD-10-CM

## 2023-11-01 LAB
2HR DELTA HS TROPONIN: 1 NG/L
ALBUMIN SERPL BCP-MCNC: 3.7 G/DL (ref 3.5–5)
ALP SERPL-CCNC: 57 U/L (ref 34–104)
ALT SERPL W P-5'-P-CCNC: 20 U/L (ref 7–52)
ANION GAP SERPL CALCULATED.3IONS-SCNC: 7 MMOL/L
AST SERPL W P-5'-P-CCNC: 28 U/L (ref 13–39)
ATRIAL RATE: 67 BPM
BASOPHILS # BLD AUTO: 0.04 THOUSANDS/ÂΜL (ref 0–0.1)
BASOPHILS NFR BLD AUTO: 0 % (ref 0–1)
BILIRUB SERPL-MCNC: 0.37 MG/DL (ref 0.2–1)
BUN SERPL-MCNC: 24 MG/DL (ref 5–25)
CALCIUM SERPL-MCNC: 8.3 MG/DL (ref 8.4–10.2)
CARDIAC TROPONIN I PNL SERPL HS: 10 NG/L
CARDIAC TROPONIN I PNL SERPL HS: 9 NG/L
CHLORIDE SERPL-SCNC: 104 MMOL/L (ref 96–108)
CO2 SERPL-SCNC: 24 MMOL/L (ref 21–32)
CREAT SERPL-MCNC: 2.42 MG/DL (ref 0.6–1.3)
EOSINOPHIL # BLD AUTO: 0.39 THOUSAND/ÂΜL (ref 0–0.61)
EOSINOPHIL NFR BLD AUTO: 3 % (ref 0–6)
ERYTHROCYTE [DISTWIDTH] IN BLOOD BY AUTOMATED COUNT: 15.3 % (ref 11.6–15.1)
GFR SERPL CREATININE-BSD FRML MDRD: 19 ML/MIN/1.73SQ M
GLUCOSE SERPL-MCNC: 85 MG/DL (ref 65–140)
GLUCOSE SERPL-MCNC: 86 MG/DL (ref 65–140)
HCT VFR BLD AUTO: 40.6 % (ref 34.8–46.1)
HGB BLD-MCNC: 13.3 G/DL (ref 11.5–15.4)
IMM GRANULOCYTES # BLD AUTO: 0.04 THOUSAND/UL (ref 0–0.2)
IMM GRANULOCYTES NFR BLD AUTO: 0 % (ref 0–2)
LYMPHOCYTES # BLD AUTO: 3.09 THOUSANDS/ÂΜL (ref 0.6–4.47)
LYMPHOCYTES NFR BLD AUTO: 27 % (ref 14–44)
MCH RBC QN AUTO: 29.2 PG (ref 26.8–34.3)
MCHC RBC AUTO-ENTMCNC: 32.8 G/DL (ref 31.4–37.4)
MCV RBC AUTO: 89 FL (ref 82–98)
METHYLMALONATE SERPL-SCNC: 358 NMOL/L (ref 0–378)
MONOCYTES # BLD AUTO: 0.7 THOUSAND/ÂΜL (ref 0.17–1.22)
MONOCYTES NFR BLD AUTO: 6 % (ref 4–12)
NEUTROPHILS # BLD AUTO: 7.4 THOUSANDS/ÂΜL (ref 1.85–7.62)
NEUTS SEG NFR BLD AUTO: 64 % (ref 43–75)
NRBC BLD AUTO-RTO: 0 /100 WBCS
P AXIS: 69 DEGREES
PLATELET # BLD AUTO: 305 THOUSANDS/UL (ref 149–390)
PMV BLD AUTO: 11 FL (ref 8.9–12.7)
POTASSIUM SERPL-SCNC: 3.7 MMOL/L (ref 3.5–5.3)
PR INTERVAL: 252 MS
PROT SERPL-MCNC: 6.4 G/DL (ref 6.4–8.4)
QRS AXIS: -5 DEGREES
QRSD INTERVAL: 86 MS
QT INTERVAL: 408 MS
QTC INTERVAL: 431 MS
RBC # BLD AUTO: 4.56 MILLION/UL (ref 3.81–5.12)
SODIUM SERPL-SCNC: 135 MMOL/L (ref 135–147)
T WAVE AXIS: 17 DEGREES
VENTRICULAR RATE: 67 BPM
WBC # BLD AUTO: 11.66 THOUSAND/UL (ref 4.31–10.16)

## 2023-11-01 PROCEDURE — 99285 EMERGENCY DEPT VISIT HI MDM: CPT | Performed by: EMERGENCY MEDICINE

## 2023-11-01 PROCEDURE — 93010 ELECTROCARDIOGRAM REPORT: CPT | Performed by: INTERNAL MEDICINE

## 2023-11-01 PROCEDURE — 96361 HYDRATE IV INFUSION ADD-ON: CPT

## 2023-11-01 PROCEDURE — 96375 TX/PRO/DX INJ NEW DRUG ADDON: CPT

## 2023-11-01 PROCEDURE — 70450 CT HEAD/BRAIN W/O DYE: CPT

## 2023-11-01 PROCEDURE — G1004 CDSM NDSC: HCPCS

## 2023-11-01 PROCEDURE — 99284 EMERGENCY DEPT VISIT MOD MDM: CPT

## 2023-11-01 PROCEDURE — 97530 THERAPEUTIC ACTIVITIES: CPT

## 2023-11-01 PROCEDURE — 93005 ELECTROCARDIOGRAM TRACING: CPT

## 2023-11-01 PROCEDURE — 82948 REAGENT STRIP/BLOOD GLUCOSE: CPT

## 2023-11-01 PROCEDURE — 84484 ASSAY OF TROPONIN QUANT: CPT | Performed by: EMERGENCY MEDICINE

## 2023-11-01 PROCEDURE — 36415 COLL VENOUS BLD VENIPUNCTURE: CPT

## 2023-11-01 PROCEDURE — 96365 THER/PROPH/DIAG IV INF INIT: CPT

## 2023-11-01 PROCEDURE — 85025 COMPLETE CBC W/AUTO DIFF WBC: CPT | Performed by: EMERGENCY MEDICINE

## 2023-11-01 PROCEDURE — 80053 COMPREHEN METABOLIC PANEL: CPT | Performed by: EMERGENCY MEDICINE

## 2023-11-01 RX ORDER — MAGNESIUM SULFATE HEPTAHYDRATE 40 MG/ML
2 INJECTION, SOLUTION INTRAVENOUS
Status: DISCONTINUED | OUTPATIENT
Start: 2023-11-01 | End: 2023-11-01 | Stop reason: HOSPADM

## 2023-11-01 RX ORDER — METOCLOPRAMIDE HYDROCHLORIDE 5 MG/ML
5 INJECTION INTRAMUSCULAR; INTRAVENOUS EVERY 8 HOURS SCHEDULED
Status: DISCONTINUED | OUTPATIENT
Start: 2023-11-01 | End: 2023-11-01 | Stop reason: HOSPADM

## 2023-11-01 RX ORDER — DEXAMETHASONE SODIUM PHOSPHATE 10 MG/ML
10 INJECTION, SOLUTION INTRAMUSCULAR; INTRAVENOUS ONCE
Status: COMPLETED | OUTPATIENT
Start: 2023-11-01 | End: 2023-11-01

## 2023-11-01 RX ORDER — SODIUM CHLORIDE 9 MG/ML
100 INJECTION, SOLUTION INTRAVENOUS ONCE
Status: DISCONTINUED | OUTPATIENT
Start: 2023-11-01 | End: 2023-11-01 | Stop reason: HOSPADM

## 2023-11-01 RX ORDER — KETOROLAC TROMETHAMINE 30 MG/ML
15 INJECTION, SOLUTION INTRAMUSCULAR; INTRAVENOUS ONCE
Status: DISCONTINUED | OUTPATIENT
Start: 2023-11-01 | End: 2023-11-01

## 2023-11-01 RX ORDER — DIPHENHYDRAMINE HYDROCHLORIDE 50 MG/ML
25 INJECTION INTRAMUSCULAR; INTRAVENOUS EVERY 8 HOURS SCHEDULED
Status: DISCONTINUED | OUTPATIENT
Start: 2023-11-01 | End: 2023-11-01 | Stop reason: HOSPADM

## 2023-11-01 RX ADMIN — MAGNESIUM SULFATE HEPTAHYDRATE 2 G: 40 INJECTION, SOLUTION INTRAVENOUS at 15:35

## 2023-11-01 RX ADMIN — SODIUM CHLORIDE 1000 ML: 0.9 INJECTION, SOLUTION INTRAVENOUS at 15:17

## 2023-11-01 RX ADMIN — DEXAMETHASONE SODIUM PHOSPHATE 10 MG: 10 INJECTION INTRAMUSCULAR; INTRAVENOUS at 15:20

## 2023-11-01 RX ADMIN — METOCLOPRAMIDE 5 MG: 5 INJECTION, SOLUTION INTRAMUSCULAR; INTRAVENOUS at 15:25

## 2023-11-01 RX ADMIN — DIPHENHYDRAMINE HYDROCHLORIDE 25 MG: 50 INJECTION, SOLUTION INTRAMUSCULAR; INTRAVENOUS at 15:23

## 2023-11-01 NOTE — ED PROVIDER NOTES
History  Chief Complaint   Patient presents with    Headache     Recent stroke on Friday and had similar symptoms      (Jacquie Suresh) Jacquie Suresh is a 75 y.o. female who identifies as female    They presented to the emergency department on November 1, 2023. Patient presents with:  Headache: Recent stroke on Friday and had similar symptoms   . The patient states that he was driving home from a restaurant when suddenly she had a severe right sided headache that will not subside. Patient was diagnosed 6 days ago with a stroke and was discharged from the hospital 3 days ago on Plavix and aspirin which she has been taking. Patient reports nausea however she denies vomiting,  shortness of breath, chest pain, neurological deficit, or any other complaint at this time. Seen at bedside in no acute distress accompanied by her . Allergies include:   -- Ciprofloxacin -- Hives   -- Sulfamethoxazole-Trimethoprim -- Other (See Comments)    --  Tongue swelling   -- Sulfamethoxazole-Trimethoprim -- Tongue Swelling   -- Ciprofloxacin -- Hives and Itching      Immunizations:    Immunization History  Administered            Date(s) Administered    COVID-19 MODERNA VACC 0.5 ML IM                          01/29/2021 02/26/2021 02/24/2022      INFLUENZA             10/17/2018  10/01/2019      Influenza Split High Dose Preservative Free IM                          10/17/2018  10/01/2019      Influenza, Seasonal Vaccine, Quadrivalent, Adjuvanted, . 5e                          08/26/2023      Influenza, high dose seasonal 0.7 mL                          09/29/2020  10/12/2021      Pneumococcal Conjugate Vaccine 20-valent (Pcv20), Polysace                          07/12/2022 07/12/2022      Pneumococcal Polysaccharide PPV23                          04/01/2021      Tdap                  12/26/2018      Zoster Vaccine Recombinant                          07/01/2023      influenza, trivalent, adjuvanted 09/29/2020  10/12/2021    Immunizations Reviewed. Prior to Admission Medications   Prescriptions Last Dose Informant Patient Reported? Taking? B-D ULTRAFINE III SHORT PEN 31G X 8 MM MISC  Self, Spouse/Significant Other Yes No   Calcium Citrate 1040 MG TABS  Self, Spouse/Significant Other Yes No   Sig: Take 500 mg by mouth Three times a day   Coenzyme Q10 (CoQ10) 100 MG CAPS  Self, Spouse/Significant Other Yes No   Sig: Take 100 mg by mouth in the morning   DULoxetine (CYMBALTA) 30 mg delayed release capsule  Self, Spouse/Significant Other No No   Sig: Take 1 capsule (30 mg total) by mouth daily   DULoxetine (CYMBALTA) 30 mg delayed release capsule   No No   Sig: Take 1 capsule (30 mg total) by mouth daily Do not start before October 31, 2023. Icosapent Ethyl 1 g CAPS  Self, Spouse/Significant Other Yes No   Sig: Take 1 capsule by mouth 2 (two) times a day   Lactobacillus (PROBIOTIC ACIDOPHILUS PO)  Self, Spouse/Significant Other Yes No   Sig: Take 1 capsule by mouth 2 (two) times a day   Probiotic Product (PROBIOTIC PO)  Self, Spouse/Significant Other Yes No   Sig: Take 1 capsule by mouth 2 (two) times a day   acetaminophen (TYLENOL) 325 mg tablet  Self, Spouse/Significant Other No No   Sig: Take 3 tablets (975 mg total) by mouth every 8 (eight) hours   Patient not taking: Reported on 10/31/2023   albuterol (Ventolin HFA) 90 mcg/act inhaler  Self, Spouse/Significant Other No No   Sig: Inhale 2 puffs every 6 (six) hours as needed for wheezing   amLODIPine (NORVASC) 5 mg tablet  Self, Spouse/Significant Other No No   Sig: Take 1 tablet (5 mg total) by mouth daily   amLODIPine (NORVASC) 5 mg tablet   No No   Sig: Take 1 tablet (5 mg total) by mouth daily Do not start before October 31, 2023. aspirin (ECOTRIN LOW STRENGTH) 81 mg EC tablet   No No   Sig: Take 1 tablet (81 mg total) by mouth daily Do not start before October 31, 2023.    cephalexin (KEFLEX) 250 mg capsule   No No   Sig: Take 1 capsule (250 mg total) by mouth daily at bedtime   cephalexin (KEFLEX) 250 mg capsule   No No   Sig: Take 1 capsule (250 mg total) by mouth daily at bedtime for 12 days   cholecalciferol (VITAMIN D3) 1,000 units tablet  Self, Spouse/Significant Other No No   Sig: Take 2 tablets (2,000 Units total) by mouth daily   cholecalciferol (VITAMIN D3) 1,000 units tablet   No No   Sig: Take 1 tablet (1,000 Units total) by mouth daily Do not start before October 31, 2023. clopidogrel (PLAVIX) 75 mg tablet   Yes No   Sig: Take 75 mg by mouth daily Once daily for 21 days   clopidogrel (PLAVIX) 75 mg tablet   No No   Sig: Take 1 tablet (75 mg total) by mouth daily for 21 days   estradiol (ESTRACE VAGINAL) 0.1 mg/g vaginal cream  Self, Spouse/Significant Other No No   Sig: Apply pea sized amount around urethra and inside vaginal opening 3 times weekly   Patient not taking: Reported on 10/10/2023   famotidine (PEPCID) 10 mg tablet  Self, Spouse/Significant Other No No   Sig: Take 1 tablet (10 mg total) by mouth daily Do not start before September 13, 2023. ferrous sulfate 324 (65 Fe) mg  Self, Spouse/Significant Other No No   Sig: Take 1 tablet (324 mg total) by mouth every other day   ferrous sulfate 325 (65 Fe) mg tablet   No No   Sig: Take 1 tablet (325 mg total) by mouth daily with breakfast Do not start before October 31, 2023. gabapentin (NEURONTIN) 100 mg capsule   No No   Sig: Take 1 capsule (100 mg total) by mouth daily at bedtime   gabapentin (Neurontin) 100 mg capsule  Self, Spouse/Significant Other No No   Sig: Take 2 capsules (200 mg total) by mouth daily at bedtime   insulin aspart (NovoLOG) 100 units/mL injection  Self, Spouse/Significant Other Yes No   Sig: Inject under the skin 3 (three) times a day before meals 18 units, 18 units, 25 units.    insulin detemir (LEVEMIR) 100 units/mL subcutaneous injection   No No   Sig: Inject 50 Units under the skin daily at bedtime   insulin detemir (Levemir FlexTouch) 100 Units/mL injection pen  Self, Spouse/Significant Other Yes No   Sig: Inject 50 Units under the skin every evening    insulin lispro (HumaLOG) 100 units/mL injection   No No   Sig: Take 18 units with breakfast and lunch and 25 units with dinner   levothyroxine (Synthroid) 125 mcg tablet  Self, Spouse/Significant Other No No   Sig: Take 1 tablet (125 mcg total) by mouth daily   levothyroxine 125 mcg tablet   No No   Sig: Take 1 tablet (125 mcg total) by mouth daily in the early morning Do not start before October 31, 2023. losartan (COZAAR) 25 mg tablet   No No   Sig: Take 1 tablet (25 mg total) by mouth 2 (two) times a day   losartan (COZAAR) 25 mg tablet   No No   Sig: Take 1 tablet (25 mg total) by mouth daily Do not start before October 31, 2023.    metoprolol tartrate (LOPRESSOR) 50 mg tablet  Self, Spouse/Significant Other No No   Sig: Take 1 tablet (50 mg total) by mouth every 12 (twelve) hours   metoprolol tartrate (LOPRESSOR) 50 mg tablet   No No   Sig: Take 1 tablet (50 mg total) by mouth every 12 (twelve) hours   pantoprazole (PROTONIX) 40 mg tablet  Self, Spouse/Significant Other No No   Sig: Take 1 tablet (40 mg total) by mouth 2 (two) times a day   pantoprazole (PROTONIX) 40 mg tablet   No No   Sig: Take 1 tablet (40 mg total) by mouth 2 (two) times a day before meals   pramipexole (MIRAPEX) 0.25 mg tablet  Self, Spouse/Significant Other No No   Sig: Take 1 tablet (0.25 mg total) by mouth daily at bedtime   pramipexole (MIRAPEX) 0.25 mg tablet   No No   Sig: Take 1 tablet (0.25 mg total) by mouth daily at bedtime   pravastatin (PRAVACHOL) 40 mg tablet   No No   Sig: Take 1 tablet (40 mg total) by mouth daily with dinner   rosuvastatin (CRESTOR) 5 mg tablet  Self, Spouse/Significant Other No No   Sig: Take 1 tablet (5 mg total) by mouth daily   torsemide (DEMADEX) 10 mg tablet  Self, Spouse/Significant Other No No   Sig: Take 0.5 tablets (5 mg total) by mouth every other day Take this medication only if your weight exceeds 172 pounds      Facility-Administered Medications Last Administration Doses Remaining   cyanocobalamin injection 1,000 mcg 10/31/2023 10:53 AM    cyanocobalamin injection 1,000 mcg None recorded           Past Medical History:   Diagnosis Date    Actinic keratosis     Acute cystitis without hematuria 04/28/2023    Acute cystitis without hematuria 04/28/2023    Acute-on-chronic kidney injury      NIKOS (acute kidney injury) ([address]) 05/08/2020    Allergic     Allergic rhinitis     Ambulatory dysfunction 10/22/2020    AMS (altered mental status) 07/14/2020    Anesthesia     pt reports "had to use double lumen endo tube for hiatal hernia repair/so surgeon could get to where he needed to work"    Arthritis     Aspiration into airway     Michael esophagus 1993    Lot of stress with children    Basal cell carcinoma 2007    left cheek     BCC (basal cell carcinoma) 05/27/2021    Left Nasal tip    Breast discharge 06/19/2023    Breast pain 06/19/2023    Cancer ([address])     squamous cell cancer on forhead    Cancer ([address])     basal cell on nose     Cholelithiasis     Chronic kidney disease 2000, 2018    Stones, kidney disease stage 4    Chronic pain disorder     bilat feet and joint pain on occas    Colon polyp     Constipation     COPD (chronic obstructive pulmonary disease) ([address])     COVID-19 08/2021    recovered at home/did receive monoclonal infusion    COVID-19 virus infection 08/16/2021    Dental bridge present     Depression     Diabetes mellitus ([address])     Type 2    Diabetic neuropathy ([address])     Difficulty walking 2017    Disease of thyroid gland     Dyspnea     Elevated liver enzymes 04/04/2023    Epigastric abdominal pain with severe diabetic gastroparesis, status post EGD/Botox injection, 5/14 05/17/2021    Facial abrasion, initial encounter 03/22/2023    Fall 03/22/2023    Family history of thyroid problem     Fatty liver     Fibromyalgia, primary     Fracture of orbital floor, blow-out, right, closed, with routine healing, subsequent encounter 03/22/2023    GERD (gastroesophageal reflux disease)     Heart burn     Hiatal hernia     History of colon polyps 07/30/2021    History of kidney stones 09/29/2020    Hx of recurrent kidney stones    History of kidney stones 09/29/2020    Hx of recurrent kidney stones  Formatting of this note might be different from the original. Hx of recurrent kidney stones  Last Assessment & Plan:  Formatting of this note might be different from the original. We will set her up for follow-up in 3 months with a KUB prior. She knows to call if she has any kidney stone type pain in the meantime.     History of pneumonia     History of repair of hiatal hernia 05/03/2020    Hyperlipidemia     Hyperplasia, parathyroid ([address])     Hypertension     Hyponatremia 04/26/2023    Hypotension 04/13/2022    Kidney problem     Kidney stone     Leukocytosis 07/14/2020    Memory loss Julu2 2020    Motion sickness     Motion sickness     Multiple closed fractures of ribs of right side 03/22/2023    Nasal bones, closed fracture 03/22/2023    Neck pain     Obesity 1978    Obesity (BMI 30.0-34.9)     Other chest pain 09/13/2021    Peripheral neuropathy     Pollen allergies     RLS (restless legs syndrome)     S/P foot surgery 07/20/2022    SCC (squamous cell carcinoma) 05/04/2021    left mid forehead    SCC (squamous cell carcinoma) 2023    chest    Seasonal allergies     Shingles 01 05 23    Sleep apnea     has inspire    Squamous cell skin cancer 2007    left cheek     Swollen ankles     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, left     great toe fusion    Urinary incontinence     Urinary tract infection 03/28/2022    Wears glasses        Past Surgical History:   Procedure Laterality Date    ABDOMINAL SURGERY  [id number],5730    Nissen x2 1972 tubal ligarion    ARTHROSCOPY KNEE      BREAST BIOPSY      stereotactic-benign    BREAST BIOPSY      stereo-benign    BREAST CYST EXCISION Bilateral     benign    BREAST EXCISIONAL BIOPSY      unknown date-benign    BREAST EXCISIONAL BIOPSY      unknown date-benign    BREAST EXCISIONAL BIOPSY      unknown date-benign    BREAST EXCISIONAL BIOPSY      unknown age-benign    CARDIAC CATHETERIZATION      CHOLECYSTECTOMY      COLONOSCOPY      EXAMINATION UNDER ANESTHESIA N/A 06/24/2021    Procedure: EXAM UNDER ANESTHESIA (EUA), DISE;  Surgeon: Nuvia Eden MD;  Location: [facility];  Service: ENT    HERNIA REPAIR      HIATAL HERNIA REPAIR      KNEE SURGERY      Torn maniscus lap surg    LIPOSUCTION      LYMPH NODE BIOPSY      MOHS SURGERY  05/20/2021    left mid forehead-Mickey    MOHS SURGERY Left 05/27/2021    Left nasal tip- mickey     WY LIGATION/BIOPSY TEMPORAL ARTERY Left 01/05/2023    Procedure: BIOPSY ARTERY TEMPORAL;  Surgeon: Cooper Louis DO;  [facility];  Service: Vascular    WY OPEN IMPLANTATION CRANIAL NERVE BOOM & PULSE GEN N/A 11/10/2021    Procedure: INSERTION UPPER AIRWAY STIMULATOR, INSPIRE IMPLANT;  Surgeon: Nuvia Eden MD;  Location: [facility];  Service: ENT    WY UNLISTED PROCEDURE FOOT/TOES Right 07/19/2022    Procedure: 1st metatarsal phalangeal joint fusion;  Surgeon: Valeria Snellen, DPM;  Location: [facility];  Service: [address] [place] [address] Road Bilateral 2000    REDUCTION MAMMAPLASTY      SKIN BIOPSY      SKIN CANCER EXCISION  2007    squamous cell carcinoma     SKIN CANCER EXCISION  2007    basal cell carcinoma    SKIN CANCER EXCISION  2023    SCCIS chest    SQUAMOUS CELL CARCINOMA EXCISION      TOE SURGERY Right 08/04/2022    Right Great Toe Fusion    TONSILLECTOMY      TUBAL LIGATION      UPPER GASTROINTESTINAL ENDOSCOPY      US GUIDED BREAST BIOPSY RIGHT COMPLETE Right 07/18/2022       Family History   Problem Relation Age of Onset    Heart disease Mother     Depression Mother     Hypertension Mother     COPD Mother     Hearing loss Mother     Anxiety disorder Mother     Heart disease Father     Lung cancer Father 79 Smoker     Cancer Father         brain    Alcohol abuse Father     Dementia Father     Hypertension Father     Thyroid disease Father     COPD Father     Arthritis Father     Brain cancer Father 76    Hypertension Sister     Diabetes Sister     Heart disease Sister     Thyroid disease Sister     Cancer Sister         Lympoma, lung    Lung cancer Sister 78    Anxiety disorder Sister     Migraines Sister     Hypertension Brother     Diabetes Brother     Cancer Brother         Throat    Dementia Brother     Stroke Brother     Hypertension Brother     Heart disease Brother     Diabetes Brother     Hypertension Brother     No Known Problems Son     No Known Problems Son     Brain cancer Paternal Aunt         unknown age    Diabetes Sister     Hypertension Sister     Diabetes Brother     Breast cancer Neg Hx      I have reviewed and agree with the history as documented. E-Cigarette/Vaping    E-Cigarette Use Never User      E-Cigarette/Vaping Substances    Nicotine No     THC No     CBD No     Flavoring No     Other No     Unknown No      Social History     Tobacco Use    Smoking status: Former     Packs/day: 2.00     Years: 10.00     Total pack years: 20.00     Types: Cigarettes     Start date: 1966     Quit date: 1976     Years since quittin.8     Passive exposure: Past    Smokeless tobacco: Never    Tobacco comments:     Smoked 2 pack a day   Vaping Use    Vaping Use: Never used   Substance Use Topics    Alcohol use: Yes     Comment: 1 or 2 a year    Drug use: No        Review of Systems   Constitutional:  Negative for chills and fever. HENT:  Negative for ear pain and sore throat. Eyes:  Negative for pain and visual disturbance. Respiratory:  Negative for cough and shortness of breath. Cardiovascular:  Negative for chest pain and palpitations. Gastrointestinal:  Negative for abdominal pain and vomiting. Genitourinary:  Negative for dysuria and hematuria.    Musculoskeletal:  Negative for arthralgias and back pain. Skin:  Negative for color change and rash. Neurological:  Positive for headaches. Negative for dizziness, seizures, syncope, facial asymmetry, speech difficulty, weakness, light-headedness and numbness. All other systems reviewed and are negative. Physical Exam  ED Triage Vitals   Temperature Pulse Respirations Blood Pressure SpO2   11/01/23 1420 11/01/23 1420 11/01/23 1420 11/01/23 1420 11/01/23 1420   98.2 °F (36.8 °C) 67 16 (!) 189/90 96 %      Temp Source Heart Rate Source Patient Position - Orthostatic VS BP Location FiO2 (%)   11/01/23 1420 11/01/23 1420 11/01/23 1530 11/01/23 1420 --   Oral Monitor Lying Right arm       Pain Score       --                    Orthostatic Vital Signs  Vitals:    11/01/23 1420 11/01/23 1530 11/01/23 1830   BP: (!) 189/90 (!) 177/88 (!) 179/86   Pulse: 67 70 85   Patient Position - Orthostatic VS:  Lying Lying       Physical Exam  Vitals and nursing note reviewed. Constitutional:       General: She is not in acute distress. Appearance: She is well-developed. HENT:      Head: Normocephalic and atraumatic. Eyes:      Conjunctiva/sclera: Conjunctivae normal.   Cardiovascular:      Rate and Rhythm: Normal rate and regular rhythm. Heart sounds: No murmur heard. Pulmonary:      Effort: Pulmonary effort is normal. No respiratory distress. Breath sounds: Normal breath sounds. Abdominal:      Palpations: Abdomen is soft. Tenderness: There is no abdominal tenderness. Musculoskeletal:         General: No swelling. Cervical back: Neck supple. Skin:     General: Skin is warm and dry. Capillary Refill: Capillary refill takes less than 2 seconds. Neurological:      General: No focal deficit present. Mental Status: She is alert and oriented to person, place, and time. Cranial Nerves: No cranial nerve deficit. Sensory: No sensory deficit. Motor: No weakness.       Coordination: Coordination normal.      Deep Tendon Reflexes: Reflexes normal.   Psychiatric:         Mood and Affect: Mood normal.         ED Medications  Medications   dexamethasone (PF) (DECADRON) injection 10 mg (10 mg Intravenous Given 11/1/23 1520)   sodium chloride 0.9 % bolus 1,000 mL (0 mL Intravenous Stopped 11/1/23 1906)       Diagnostic Studies  Results Reviewed       Procedure Component Value Units Date/Time    HS Troponin I 2hr [[id number]]  (Normal) Collected: 11/01/23 1627    Lab Status: Final result Specimen: Blood from Arm, Right Updated: 11/01/23 1656     hs TnI 2hr 10 ng/L      Delta 2hr hsTnI 1 ng/L     HS Troponin 0hr (reflex protocol) [[id number]]  (Normal) Collected: 11/01/23 1427    Lab Status: Final result Specimen: Blood from Arm, Right Updated: 11/01/23 1506     hs TnI 0hr 9 ng/L     Comprehensive metabolic panel [[id number]]  (Abnormal) Collected: 11/01/23 1427    Lab Status: Final result Specimen: Blood from Arm, Right Updated: 11/01/23 1500     Sodium 135 mmol/L      Potassium 3.7 mmol/L      Chloride 104 mmol/L      CO2 24 mmol/L      ANION GAP 7 mmol/L      BUN 24 mg/dL      Creatinine 2.42 mg/dL      Glucose 85 mg/dL      Calcium 8.3 mg/dL      AST 28 U/L      ALT 20 U/L      Alkaline Phosphatase 57 U/L      Total Protein 6.4 g/dL      Albumin 3.7 g/dL      Total Bilirubin 0.37 mg/dL      eGFR 19 ml/min/1.73sq m     Narrative:      [facility] Disease (CKD):     Stage 1 with normal or high GFR (GFR > 90 mL/min/1.73 square meters)    Stage 2 Mild CKD (GFR = 60-89 mL/min/1.73 square meters)    Stage 3A Moderate CKD (GFR = 45-59 mL/min/1.73 square meters)    Stage 3B Moderate CKD (GFR = 30-44 mL/min/1.73 square meters)    Stage 4 Severe CKD (GFR = 15-29 mL/min/1.73 square meters)    Stage 5 End Stage CKD (GFR <15 mL/min/1.73 square meters)  Note: GFR calculation is accurate only with a steady state creatinine    CBC and differential [[id number]]  (Abnormal) Collected: 11/01/23 1427    Lab Status: Final result Specimen: Blood from Arm, Right Updated: 11/01/23 1446     WBC 11.66 Thousand/uL      RBC 4.56 Million/uL      Hemoglobin 13.3 g/dL      Hematocrit 40.6 %      MCV 89 fL      MCH 29.2 pg      MCHC 32.8 g/dL      RDW 15.3 %      MPV 11.0 fL      Platelets 347 Thousands/uL      nRBC 0 /100 WBCs      Neutrophils Relative 64 %      Immat GRANS % 0 %      Lymphocytes Relative 27 %      Monocytes Relative 6 %      Eosinophils Relative 3 %      Basophils Relative 0 %      Neutrophils Absolute 7.40 Thousands/µL      Immature Grans Absolute 0.04 Thousand/uL      Lymphocytes Absolute 3.09 Thousands/µL      Monocytes Absolute 0.70 Thousand/µL      Eosinophils Absolute 0.39 Thousand/µL      Basophils Absolute 0.04 Thousands/µL     Fingerstick Glucose (POCT) [[id number]]  (Normal) Collected: 11/01/23 1425    Lab Status: Final result Updated: 11/01/23 1426     POC Glucose 86 mg/dl                    CT head without contrast   Final Result by Allyson Anne MD (11/01 1839)      No acute intracranial abnormality. In particular, no acute territorial infarct or intracranial hemorrhage.                      Workstation performed: [id number]               Procedures  ECG 12 Lead Documentation Only    Date/Time: 11/1/2023 3:59 PM    Performed by: Francesca Jasso MD  Authorized by: Francesca Jasso MD    Indications / Diagnosis:  Headaches  ECG reviewed by me, the ED Provider: yes    Patient location:  ED  Previous ECG:     Previous ECG:  Compared to current    Comparison ECG info:  October 27, 2023    Similarity:  No change    Comparison to cardiac monitor: Yes    Interpretation:     Interpretation: normal    Rate:     ECG rate:  67 bpm    ECG rate assessment: normal    Rhythm:     Rhythm: sinus rhythm    Ectopy:     Ectopy: none    QRS:     QRS axis:  Normal    QRS intervals:  Normal  Conduction:     Conduction: abnormal      Abnormal conduction: 1st degree ST segments:     ST segments:  Normal  T waves:     T waves: normal    Comments:      Ventricular rate 67 bpm, VT interval 252 ms, QRS duration 86 ms,  ms, QTc 431 ms  There is evidence of first-degree AV block. Normal sinus rhythm. ED Course  ED Course as of 11/03/23 2226   Wed Nov 01, 2023   157 57-year-old female past medical history of hypertension and stroke presented for evaluation of sudden headache. 1458 Ordered CBC CMP troponin EKG CTA head and neck, give migraine cocktail. 1544 Mildly elevated WBC of 11.66 this will be in the setting of infection or inflammation. Normal H&H.   1546 Comprehensive metabolic panel(!)  Normal anion gap, mild NIKOS of 2.42 patient baseline is 2.3. Normal T. bili. 1546 hs TnI 0hr: 9   1615 CBC and differential(!)  Leukocytosis of 11.66 this could be in the setting of infection or admission. Normal H&H   1701 hs TnI 2hr: 10  Delta 1. Discontinued fourth hour. 1720 On reevaluation after administration of migraine cocktail, patient reported that headache has subsided. 1853 CT head without contrast  No acute intracranial abnormality. In particular, no acute territorial infarct or intracranial hemorrhage. [id number] Patient made aware of the unremarkable result of CT head. Patient discharge with return precautions. Medical Decision Making  Patient presents with:  Headache: Recent stroke on Friday and had similar symptoms       Patient seen and examined noted to have headaches. Differential diagnosis includes but is not limited to migraine headaches, stroke, intracranial hemorrhage. Due to patient's history and presentation the following imaging was collected:  CT head without contrast   Final Result        No acute intracranial abnormality. In particular, no acute territorial infarct or intracranial hemorrhage. Workstation performed: [id number]         No results found. EKG: interpreted by myself as above. Patient was administered: Decadron, fluid      Patient appears well, is nontoxic appearing, she expresses understanding and agrees with plan of care at this time. In light of this patient would benefit from outpatient management. Discharge with a diagnosis of migraine headache. She was asked to take Tylenol as needed for headaches. Return precaution discussed. Amount and/or Complexity of Data Reviewed  Independent Historian: spouse  Labs: ordered. Decision-making details documented in ED Course. Radiology: ordered. Decision-making details documented in ED Course. Risk  Prescription drug management. Disposition  Final diagnoses:   Migraine headache     Time reflects when diagnosis was documented in both MDM as applicable and the Disposition within this note       Time User Action Codes Description Comment    11/1/2023  6:59 PM Silver Wiseman Add [G43.909] Migraine headache           ED Disposition       ED Disposition   Discharge    Condition   Stable    Date/Time   Wed Nov 1, 2023  7:01 PM    Comment   Марина Ferrarijace Davis discharge to home/self care.                    Follow-up Information       Follow up With Specialties Details Why Contact Info    Kimberly Soto MD Internal Medicine   [address] [place] [address] [address] [place] [address]  [phone]              Discharge Medication List as of 11/1/2023  7:01 PM        CONTINUE these medications which have NOT CHANGED    Details   acetaminophen (TYLENOL) 325 mg tablet Take 3 tablets (975 mg total) by mouth every 8 (eight) hours, Starting Thu 3/23/2023, No Print      albuterol (Ventolin HFA) 90 mcg/act inhaler Inhale 2 puffs every 6 (six) hours as needed for wheezing, Starting Thu 8/4/2022, Normal      !! amLODIPine (NORVASC) 5 mg tablet Take 1 tablet (5 mg total) by mouth daily, Starting Wed 5/24/2023, Normal      !! amLODIPine (NORVASC) 5 mg tablet Take 1 tablet (5 mg total) by mouth daily Do not start before October 31, 2023., Starting Tue 10/31/2023, No Print      aspirin (ECOTRIN LOW STRENGTH) 81 mg EC tablet Take 1 tablet (81 mg total) by mouth daily Do not start before October 31, 2023., Starting Tue 10/31/2023, No Print      B-D ULTRAFINE III SHORT PEN 31G X 8 MM MISC Historical Med      Calcium Citrate 1040 MG TABS Take 500 mg by mouth Three times a day, Starting Wed 11/16/2022, Historical Med      !! cephalexin (KEFLEX) 250 mg capsule Take 1 capsule (250 mg total) by mouth daily at bedtime, Starting Thu 10/12/2023, Until Sat 11/11/2023, Normal      !! cephalexin (KEFLEX) 250 mg capsule Take 1 capsule (250 mg total) by mouth daily at bedtime for 12 days, Starting Mon 10/30/2023, Until Sat 11/11/2023, No Print      !! cholecalciferol (VITAMIN D3) 1,000 units tablet Take 2 tablets (2,000 Units total) by mouth daily, Starting Thu 9/21/2023, Normal      !! cholecalciferol (VITAMIN D3) 1,000 units tablet Take 1 tablet (1,000 Units total) by mouth daily Do not start before October 31, 2023., Starting Tue 10/31/2023, No Print      !! clopidogrel (PLAVIX) 75 mg tablet Take 75 mg by mouth daily Once daily for 21 days, Historical Med      !! clopidogrel (PLAVIX) 75 mg tablet Take 1 tablet (75 mg total) by mouth daily for 21 days, Starting Mon 10/30/2023, Until Mon 11/20/2023, Normal      Coenzyme Q10 (CoQ10) 100 MG CAPS Take 100 mg by mouth in the morning, Historical Med      !! DULoxetine (CYMBALTA) 30 mg delayed release capsule Take 1 capsule (30 mg total) by mouth daily, Starting Tue 9/12/2023, No Print      !!  DULoxetine (CYMBALTA) 30 mg delayed release capsule Take 1 capsule (30 mg total) by mouth daily Do not start before October 31, 2023., Starting Tue 10/31/2023, No Print      estradiol (ESTRACE VAGINAL) 0.1 mg/g vaginal cream Apply pea sized amount around urethra and inside vaginal opening 3 times weekly, Normal      famotidine (PEPCID) 10 mg tablet Take 1 tablet (10 mg total) by mouth daily Do not start before September 13, 2023., Starting Wed 9/13/2023, Until Tue 10/31/2023, Normal      ferrous sulfate 324 (65 Fe) mg Take 1 tablet (324 mg total) by mouth every other day, Starting Mon 8/21/2023, Until Sun 11/19/2023, Normal      ferrous sulfate 325 (65 Fe) mg tablet Take 1 tablet (325 mg total) by mouth daily with breakfast Do not start before October 31, 2023., Starting Tue 10/31/2023, No Print      !! gabapentin (Neurontin) 100 mg capsule Take 2 capsules (200 mg total) by mouth daily at bedtime, Starting Thu 9/21/2023, Normal      !! gabapentin (NEURONTIN) 100 mg capsule Take 1 capsule (100 mg total) by mouth daily at bedtime, Starting Mon 10/30/2023, No Print      Icosapent Ethyl 1 g CAPS Take 1 capsule by mouth 2 (two) times a day, Starting Tue 4/4/2023, Historical Med      insulin aspart (NovoLOG) 100 units/mL injection Inject under the skin 3 (three) times a day before meals 18 units, 18 units, 25 units. , Historical Med      insulin detemir (Levemir FlexTouch) 100 Units/mL injection pen Inject 50 Units under the skin every evening , Starting Tue 6/8/2021, Historical Med      insulin detemir (LEVEMIR) 100 units/mL subcutaneous injection Inject 50 Units under the skin daily at bedtime, Starting Mon 10/30/2023, No Print      insulin lispro (HumaLOG) 100 units/mL injection Take 18 units with breakfast and lunch and 25 units with dinner, No Print      !!  Lactobacillus (PROBIOTIC ACIDOPHILUS PO) Take 1 capsule by mouth 2 (two) times a day, Historical Med      !! levothyroxine (Synthroid) 125 mcg tablet Take 1 tablet (125 mcg total) by mouth daily, Starting Mon 9/11/2023, No Print      !! levothyroxine 125 mcg tablet Take 1 tablet (125 mcg total) by mouth daily in the early morning Do not start before October 31, 2023., Starting Tue 10/31/2023, No Print      !! losartan (COZAAR) 25 mg tablet Take 1 tablet (25 mg total) by mouth 2 (two) times a day, Starting Tue 10/24/2023, Normal      !! losartan (COZAAR) 25 mg tablet Take 1 tablet (25 mg total) by mouth daily Do not start before October 31, 2023., Starting Tue 10/31/2023, No Print      !! metoprolol tartrate (LOPRESSOR) 50 mg tablet Take 1 tablet (50 mg total) by mouth every 12 (twelve) hours, Starting Tue 6/6/2023, No Print      !! metoprolol tartrate (LOPRESSOR) 50 mg tablet Take 1 tablet (50 mg total) by mouth every 12 (twelve) hours, Starting Mon 10/30/2023, No Print      !! pantoprazole (PROTONIX) 40 mg tablet Take 1 tablet (40 mg total) by mouth 2 (two) times a day, Starting Fri 9/22/2023, Normal      !! pantoprazole (PROTONIX) 40 mg tablet Take 1 tablet (40 mg total) by mouth 2 (two) times a day before meals, Starting Mon 10/30/2023, No Print      !! pramipexole (MIRAPEX) 0.25 mg tablet Take 1 tablet (0.25 mg total) by mouth daily at bedtime, Starting Wed 2/15/2023, Normal      !! pramipexole (MIRAPEX) 0.25 mg tablet Take 1 tablet (0.25 mg total) by mouth daily at bedtime, Starting Mon 10/30/2023, No Print      pravastatin (PRAVACHOL) 40 mg tablet Take 1 tablet (40 mg total) by mouth daily with dinner, Starting Mon 10/30/2023, No Print      !! Probiotic Product (PROBIOTIC PO) Take 1 capsule by mouth 2 (two) times a day, Historical Med      rosuvastatin (CRESTOR) 5 mg tablet Take 1 tablet (5 mg total) by mouth daily, Starting Wed 5/24/2023, Normal      torsemide (DEMADEX) 10 mg tablet Take 0.5 tablets (5 mg total) by mouth every other day Take this medication only if your weight exceeds 172 pounds, Starting Sat 8/19/2023, Until Sun 12/17/2023, Normal       !! - Potential duplicate medications found. Please discuss with provider. No discharge procedures on file. PDMP Review         Value Time User    PDMP Reviewed  Yes 3/24/2023  9:59 PM Ovidio Pratt MD             ED Provider  Attending physically available and evaluated Gregory Ingram. I managed the patient along with the ED Attending.     Electronically Signed by           Shanelle Keita MD [place]  11/03/23 1724

## 2023-11-01 NOTE — PROGRESS NOTES
OCCUPATIONAL THERAPY Re-EVALUATION    Today's Date: 2023  Patient Name: Jayden Castanon  : 1950  MRN: [id number]  Referring Provider: Cecily GrandchildEVE  Dx: Memory difficulties [R41.3]    SKILLED ANALYSIS:  Pt presents to re-evaluation on [unfilled] status post memory deficits and was hospitalized over weekend with a stroke. As per interview, pt reports she has been maintianing cognitive function and no decline in cognitive function since CVA. Pt's spouse reports she will be seeing neuropsychology in a few weeks. Post assessments, pt demonstrating improvements in functional cognition with pt able to increase MOCA score, increased sustained attention. Pt continues to demonstrate impairments in sustained attention, memory, abstract thinking. Pt achieved 3 STGs. Pt would benefit from continued OT services focusing on impairments for 8 more weeks. Pt educated on progress/therapy process and pt in understanding and agreement of recommendations. Recommending to continue HEP       Educated pt on charges of insurance, acknowledge understanding, and are in agreement. MVPT next session       PLAN OF CARE START:23  PLAN OF CARE END: 2023  FREQUENCY: 2 times a week  PRECAUTIONS Memory deficits, FALL RISK      Subjective    Mechanism of Injury  Pt and her , Cindi Mena, began to notice minor memory difficulties a few years ago. Pt has appointment in 2 weeks with neurology (first time seeing them was 2 years ago with no findings). Cindi Mena reports that since she sustained a nose and orbital injury with a concussion post-fall in March of this year, things have been worse. Pt's  reports that she has been frequently getting UTI's and has had multiple in the last month. Increased confusion and altered mental status is usual for her during UTIs. Upon discharge of a recent hospital visit, PA noted possible signs of dementia.  Pt was recommended self catheterization due to her inability to fully empty bladder however her and her  are not on board due to risks of infection. Occupational Profile  Pt reports she lives with her adult son and . Pt reports she forgets things like movies watched even 2 days prior and has difficulty with short term memory as she forgets things that happened 2 minutes ago including what she was talking about and will ask the same question she asked a few minutes prior. Pt reports every once in a while when she is, "out of it" (increased confusion), her  has to dress her. Pt  reports that even when she is not confused, she has memory difficulties. Pt reports occasional dizziness with 5 falls in the last year. Pt lives in a 2 story house and lives on the first floor and everything is on first floor including laundry and kitchen   1 eight inch step to enter home. Pt's  reports she occasionally uses cane and rollater. Pt will be working on balance and R knee pain with PT. PATIENT GOAL: "It would be nice if I could remember things normal people remember.  It seems like things that are normal like movies or the ball game, it would be nice if I could remember things."    HISTORY OF PRESENT ILLNESS:     PMH:   Past Medical History:   Diagnosis Date    Actinic keratosis     Acute cystitis without hematuria 04/28/2023    Acute cystitis without hematuria 04/28/2023    Acute-on-chronic kidney injury      NIKOS (acute kidney injury) ([address]) 05/08/2020    Allergic     Allergic rhinitis     Ambulatory dysfunction 10/22/2020    AMS (altered mental status) 07/14/2020    Anesthesia     pt reports "had to use double lumen endo tube for hiatal hernia repair/so surgeon could get to where he needed to work"    Arthritis     Aspiration into airway     Michael esophagus 1993    Lot of stress with children    Basal cell carcinoma 2007    left cheek     BCC (basal cell carcinoma) 05/27/2021    Left Nasal tip    Breast discharge 06/19/2023    Breast pain 06/19/2023 Cancer ([address])     squamous cell cancer on forhead    Cancer ([address])     basal cell on nose     Cholelithiasis     Chronic kidney disease 2000, 2018    Stones, kidney disease stage 4    Chronic pain disorder     bilat feet and joint pain on occas    Colon polyp     Constipation     COPD (chronic obstructive pulmonary disease) ([address])     COVID-19 08/2021    recovered at home/did receive monoclonal infusion    COVID-19 virus infection 08/16/2021    Dental bridge present     Depression     Diabetes mellitus ([address])     Type 2    Diabetic neuropathy ([address])     Difficulty walking 2017    Disease of thyroid gland     Dyspnea     Elevated liver enzymes 04/04/2023    Epigastric abdominal pain with severe diabetic gastroparesis, status post EGD/Botox injection, 5/14 05/17/2021    Facial abrasion, initial encounter 03/22/2023    Fall 03/22/2023    Family history of thyroid problem     Fatty liver     Fibromyalgia, primary     Fracture of orbital floor, blow-out, right, closed, with routine healing, subsequent encounter 03/22/2023    GERD (gastroesophageal reflux disease)     Heart burn     Hiatal hernia     History of colon polyps 07/30/2021    History of kidney stones 09/29/2020    Hx of recurrent kidney stones    History of kidney stones 09/29/2020    Hx of recurrent kidney stones  Formatting of this note might be different from the original. Hx of recurrent kidney stones  Last Assessment & Plan:  Formatting of this note might be different from the original. We will set her up for follow-up in 3 months with a KUB prior. She knows to call if she has any kidney stone type pain in the meantime.     History of pneumonia     History of repair of hiatal hernia 05/03/2020    Hyperlipidemia     Hyperplasia, parathyroid ([address])     Hypertension     Hyponatremia 04/26/2023    Hypotension 04/13/2022    Kidney problem     Kidney stone     Leukocytosis 07/14/2020    Memory loss Julu2 2020    Motion sickness     Motion sickness     Multiple closed fractures of ribs of right side 03/22/2023    Nasal bones, closed fracture 03/22/2023    Neck pain     Obesity 1978    Obesity (BMI 30.0-34.9)     Other chest pain 09/13/2021    Peripheral neuropathy     Pollen allergies     RLS (restless legs syndrome)     S/P foot surgery 07/20/2022    SCC (squamous cell carcinoma) 05/04/2021    left mid forehead    SCC (squamous cell carcinoma) 2023    chest    Seasonal allergies     Shingles 01 05 23    Sleep apnea     has inspire    Squamous cell skin cancer 2007    left cheek     Swollen ankles     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, left     great toe fusion    Urinary incontinence     Urinary tract infection 03/28/2022    Wears glasses        Past Surgical Hx:   Past Surgical History:   Procedure Laterality Date    ABDOMINAL SURGERY  [id number]    Nissen x2 1972 tubal ligarion    ARTHROSCOPY KNEE      BREAST BIOPSY      stereotactic-benign    BREAST BIOPSY      stereo-benign    BREAST CYST EXCISION Bilateral     benign    BREAST EXCISIONAL BIOPSY      unknown date-benign    BREAST EXCISIONAL BIOPSY      unknown date-benign    BREAST EXCISIONAL BIOPSY      unknown date-benign    BREAST EXCISIONAL BIOPSY      unknown age-benign    CARDIAC CATHETERIZATION      CHOLECYSTECTOMY      COLONOSCOPY      EXAMINATION UNDER ANESTHESIA N/A 06/24/2021    Procedure: EXAM UNDER ANESTHESIA (EUA), DISE;  Surgeon: Ana Diamond MD;  [facility];  Service: ENT    HERNIA REPAIR      HIATAL HERNIA REPAIR      KNEE SURGERY      Torn maniscus lap surg    LIPOSUCTION      LYMPH NODE BIOPSY      MOHS SURGERY  05/20/2021    left mid forehead-Mickey    MOHS SURGERY Left 05/27/2021    Left nasal tip- mickey     DE LIGATION/BIOPSY TEMPORAL ARTERY Left 01/05/2023    Procedure: BIOPSY ARTERY TEMPORAL;  Surgeon: Bhumi Muhammad DO;  [facility];  Service: Vascular    DE OPEN IMPLANTATION CRANIAL NERVE BOOM & PULSE GEN N/A 11/10/2021    Procedure: INSERTION UPPER AIRWAY STIMULATOR, INSPIRE IMPLANT;  Surgeon: Carolyn Kumar MD;  Location: [facility];  Service: ENT    RI UNLISTED PROCEDURE FOOT/TOES Right 2022    Procedure: 1st metatarsal phalangeal joint fusion;  Surgeon: Maura Arguello DPM;  Location: [facility];  Service: [address] Bilateral 2000    REDUCTION MAMMAPLASTY      SKIN BIOPSY      SKIN CANCER EXCISION      squamous cell carcinoma     SKIN CANCER EXCISION      basal cell carcinoma    SKIN CANCER EXCISION      SCCIS chest    SQUAMOUS CELL CARCINOMA EXCISION      TOE SURGERY Right 2022    Right Great Toe Fusion    TONSILLECTOMY      TUBAL LIGATION      UPPER GASTROINTESTINAL ENDOSCOPY      US GUIDED BREAST BIOPSY RIGHT COMPLETE Right 2022        Pain: pt denies pain     Objective    Functional Cognition:  Highest level of education: graduated     [place] Cognitive Assessment Version 8.1 (MoCA V8.1)  Visuospatial/executive functionin/5  Naming:  3/3  Memory: 1st trial:  4/5, 2nd trial:  4/5  Attention/concentration: 12  List of letters:   Serial Seven Subtraction:  0/3 w/ 0 errors  Language/sentence repetition:  1/  Language Fluency:   with pt was able to state 11   Abstract/Correlational Thinkin/2  Delayed Recall:  0/5  Orientation:  3/6   Memory Index Score: 4/15  MoCA V1 8.1 Raw Score:  30, MIS:  3/15, indicative of MODERATE neurocognitive impairments.     MoCA Scoring        Normal: 26+         Mild Cognitive Impairment: 18-25          Moderate Cognitive Impairment: 10-17         Severe Cognitive Impairment: <10    Trail making Part A and Part B:   Part A: 1 minute 38 seconds  required 4-5 Vcs for problem solving and decreased  skills (previous 5 minutes with 8 visual and verbal cues; pt unable to complete task after 5 minutes therefore terminated when she reached number 20)  Part B: not completed today due to increased difficulty with Part A  Indicating deficits: Part A > 40.13 seconds and Part B > 86.27 seconds      Contextual Memory Test:  Immediate: 6/20 (previous 4/20, 0 confabulations)  Delayed: 1/20 (previous 3/20, 0 confabulations)         GOALS:   Short Term Goals:  Pt will increase sustained attention by being able to complete trail making part A in 3 minutes and 30 seconds with 3 verbal/visual cues to complete IADLs. ACHIEVED   Pt will increase delayed recall and recall 2/5 items on MOCA to complete IADLs  Pt will follow 3 step verbal directions in semimodal environment for improved role performance and engagement in salient tasks  Pt will increase auditory processing speed for direction following requiring repetition of directions <50% of the time for improved role performance and engagement in salient tasks  Pt will be alert and oriented x 4 50% of the time to inc temporal orientation for appointment keeping and safe IADL practice ACHIEVED   Pt will demo increased insight into deficits x 75% for overall increased safety and self awareness with ADL/IADL tasks and to encourage use of compensatory strategies ACHIEVED         Long Term Goals: 8-12 weeks  Pt will maintain attention to task for 5 minutes in multimodal environment for baseline performance,  improved role performance and to improve learning and to simulate return to life environment  Pt will demo ability to participate in dual tasking/divided attention task with 75% accuracy in multimodal environment to simulate return to life roles  Pt will demo ability to alternate attention between 2 tasks with cog loading and 75% accuracy with G retention of task directions in multimodal environment to simulate return to life roles. Pt will demonstrate an increase in Memory Index Score on the MoCA to 6/15 to improve delayed recall.        OTHER PLANNED THERAPY INTERVENTIONS:   Internal and external memory aides  Multimatrix for saccades/ visual clutter/attention  Hypersensitivity strategies education  Multi-modal environment  Sustained/alternating/divided attention  Tracking tube  Oculomotor control:  saccades, con/divergence  Conv./div.  Dynamic tasks  Work stations with timed transitions  Temporal Awareness  Memory and mental manipulation  Auditory processing with immediate recall  Memory retention with immediate and delayed recall  Edu on cog/vision apps

## 2023-11-01 NOTE — DISCHARGE INSTRUCTIONS
Your CT scan showed no signs of acute abnormality/bleed  Follow-up with your PCP  Take Tylenol as needed for headaches  Return sooner to the ED if you experience severe headache not responding to Tylenol, visual problems or feel worse.

## 2023-11-02 ENCOUNTER — HOME CARE VISIT (OUTPATIENT)
Dept: HOME HEALTH SERVICES | Facility: HOME HEALTHCARE | Age: 73
End: 2023-11-02

## 2023-11-02 ENCOUNTER — VBI (OUTPATIENT)
Dept: INTERNAL MEDICINE CLINIC | Facility: CLINIC | Age: 73
End: 2023-11-02

## 2023-11-02 ENCOUNTER — TELEPHONE (OUTPATIENT)
Dept: NEUROLOGY | Facility: CLINIC | Age: 73
End: 2023-11-02

## 2023-11-02 LAB — METHYLMALONATE SERPL-SCNC: 457 NMOL/L (ref 0–378)

## 2023-11-02 NOTE — TELEPHONE ENCOUNTER
11/02/23 11:10 AM    Patient contacted post ED visit, [facility] department spoke with patient/caregiver and outreach was successful. Thank you.   Arias DONOVAN [facility]

## 2023-11-02 NOTE — TELEPHONE ENCOUNTER
LUANA/10/27-10/30/STROKE        Patient follow-up outpatient with Dr. Kathy Hayes in 6 to 8 weeks     HFU made for 11/12 @ 10:00 with Dr. Kathy Hayes.

## 2023-11-03 ENCOUNTER — PATIENT OUTREACH (OUTPATIENT)
Dept: INTERNAL MEDICINE CLINIC | Facility: CLINIC | Age: 73
End: 2023-11-03

## 2023-11-03 DIAGNOSIS — Z71.89 COMPLEX CARE COORDINATION: Primary | ICD-10-CM

## 2023-11-03 NOTE — PROGRESS NOTES
QA referral received via IB. Patient w/recent IP admission 10/27-10/30 with CVA. Chart reviewed and patient changed to [facility] PCP-Dr Kumari. F? U scheduled on 11/7. Patient does not qualify for CCM services at this time.

## 2023-11-05 NOTE — ED ATTENDING ATTESTATION
11/1/2023  IMarielos DO, saw and evaluated the patient. I have discussed the patient with the resident/non-physician practitioner and agree with the resident's/non-physician practitioner's findings, Plan of Care, and MDM as documented in the resident's/non-physician practitioner's note, except where noted. All available labs and Radiology studies were reviewed. I was present for key portions of any procedure(s) performed by the resident/non-physician practitioner and I was immediately available to provide assistance. At this point I agree with the current assessment done in the Emergency Department. I have conducted an independent evaluation of this patient a history and physical is as follows:    76-year-old female coming to the ED for evaluation of a relatively sudden onset of right-sided headache that started about 2 hours ago. She was recently admitted in the hospital with an acute CVA, was discharged home she states with her stroke symptoms resolved. She states that her stroke symptoms were aphasia and weakness, difficulty speaking. She states at this time all she has a headache, but no stroke symptoms such as difficulty speaking, weakness, numbness or tingling. She had a CTA head and neck during that admission that did not did not show a cerebral aneurysm. PE:  The patient is well appearing, non-toxic, in NAD. Head: normocephalic, atraumatic. HEENT: mucous membranes moist.  Lungs: CTA b/l, no resp distress. Heart: RRR. No M/R/G. Abdomen: NT, ND, no R/R/G. Neuro: CN2-12 intact, GCS 15. Normal strength and sensation, normal speech and gait. Cap refill < 2 sec, skin warm and dry. No rashes or lesions. DDx including subarachnoid hemorrhage, other intracranial hemorrhage, stroke, migraine headache, tension headache. ED evaluation, labs, CT head noncontrast: unremarkable. Symptoms improved with  migraine cocktail. Stable for d/c home, likely migrainous headache.   With normal neuro exam and resolved headache, doubt the alternative diagnoses above      ED Course         Critical Care Time  Procedures

## 2023-11-06 ENCOUNTER — TELEPHONE (OUTPATIENT)
Dept: NEUROLOGY | Facility: CLINIC | Age: 73
End: 2023-11-06

## 2023-11-06 ENCOUNTER — APPOINTMENT (OUTPATIENT)
Dept: LAB | Facility: AMBULARY SURGERY CENTER | Age: 73
DRG: 304 | End: 2023-11-06
Payer: MEDICARE

## 2023-11-06 DIAGNOSIS — R39.9 UTI SYMPTOMS: ICD-10-CM

## 2023-11-06 LAB
BACTERIA UR QL AUTO: ABNORMAL /HPF
BILIRUB UR QL STRIP: NEGATIVE
CLARITY UR: CLEAR
COLOR UR: ABNORMAL
GLUCOSE UR STRIP-MCNC: ABNORMAL MG/DL
GRAN CASTS #/AREA URNS LPF: ABNORMAL /[LPF]
HGB UR QL STRIP.AUTO: ABNORMAL
HYALINE CASTS #/AREA URNS LPF: ABNORMAL /LPF
KETONES UR STRIP-MCNC: NEGATIVE MG/DL
LEUKOCYTE ESTERASE UR QL STRIP: NEGATIVE
MUCOUS THREADS UR QL AUTO: ABNORMAL
NITRITE UR QL STRIP: NEGATIVE
NON-SQ EPI CELLS URNS QL MICRO: ABNORMAL /HPF
PH UR STRIP.AUTO: 6.5 [PH]
PROT UR STRIP-MCNC: ABNORMAL MG/DL
RBC #/AREA URNS AUTO: ABNORMAL /HPF
SP GR UR STRIP.AUTO: 1.02 (ref 1–1.03)
UROBILINOGEN UR STRIP-ACNC: <2 MG/DL
WBC #/AREA URNS AUTO: ABNORMAL /HPF

## 2023-11-06 PROCEDURE — 81001 URINALYSIS AUTO W/SCOPE: CPT | Performed by: PHYSICIAN ASSISTANT

## 2023-11-06 PROCEDURE — 87086 URINE CULTURE/COLONY COUNT: CPT

## 2023-11-06 NOTE — TELEPHONE ENCOUNTER
Post CVA Discharge Follow Up  Hospitalization: 10/27/23-10/30/23    Called patient. Spoke with the patient and her spouse, iGsela Tilley. Since discharge, they deny experiencing any new or worsening stroke-like symptoms. They deny the presence of any residual stroke symptoms following hospitalization and claims she has returned to baseline functioning. Patient mobilizes with a cane or walker. She is independent with most ADLs. Gisela Tilley helps as needed. Reviewed appointments - patient successfully followed up with PCP on 10/31/23. Patient scheduled for the followin23 with cardiology, 23 with neurology. OT is scheduled for 11/10/23. Reviewed medications with her. There have not been any medication changes since discharge from the hospital. Reports having no difficulties obtaining medications. Reports she is taking as prescribed with no medication side effects or signs of bleeding. They also verbalize understanding of DAPT instructions/plan. During this call, we reviewed stroke type, symptoms, personal risk factors and management, medications, and resources. Offered to mail stroke education booklet in the mail, they are agreeable to this. Placed in mail. As for risk factors, patient has a glucose monitoring and her readings are typically <180 per Gisela Tilley. They will start checking her BP. He reports the nephrologist is working with the patient to lower BP. She is a non smoker. Encouraged a well balanced, diabetic diet. Addressed all of their questions. At the conclusion of the conversation, they deny having any further questions or concerns.

## 2023-11-07 ENCOUNTER — APPOINTMENT (OUTPATIENT)
Facility: CLINIC | Age: 73
End: 2023-11-07
Payer: MEDICARE

## 2023-11-07 ENCOUNTER — APPOINTMENT (INPATIENT)
Dept: CT IMAGING | Facility: HOSPITAL | Age: 73
DRG: 304 | End: 2023-11-07
Payer: MEDICARE

## 2023-11-07 ENCOUNTER — HOSPITAL ENCOUNTER (INPATIENT)
Facility: HOSPITAL | Age: 73
LOS: 2 days | Discharge: HOME | DRG: 304 | End: 2023-11-09
Attending: EMERGENCY MEDICINE | Admitting: INTERNAL MEDICINE
Payer: MEDICARE

## 2023-11-07 ENCOUNTER — APPOINTMENT (EMERGENCY)
Dept: RADIOLOGY | Facility: HOSPITAL | Age: 73
DRG: 304 | End: 2023-11-07
Payer: MEDICARE

## 2023-11-07 DIAGNOSIS — G93.40 ACUTE ENCEPHALOPATHY: ICD-10-CM

## 2023-11-07 DIAGNOSIS — R51.9 ACUTE HEADACHE: Primary | ICD-10-CM

## 2023-11-07 DIAGNOSIS — R80.9 PROTEINURIA: ICD-10-CM

## 2023-11-07 DIAGNOSIS — R31.9 HEMATURIA: ICD-10-CM

## 2023-11-07 DIAGNOSIS — R53.1 GENERALIZED WEAKNESS: ICD-10-CM

## 2023-11-07 DIAGNOSIS — I16.0 HYPERTENSIVE URGENCY: ICD-10-CM

## 2023-11-07 DIAGNOSIS — R03.0 ELEVATED BLOOD PRESSURE READING: ICD-10-CM

## 2023-11-07 LAB
2HR DELTA HS TROPONIN: -3 NG/L
ALBUMIN SERPL BCP-MCNC: 3.4 G/DL (ref 3.5–5)
ALP SERPL-CCNC: 55 U/L (ref 34–104)
ALT SERPL W P-5'-P-CCNC: 27 U/L (ref 7–52)
ANION GAP SERPL CALCULATED.3IONS-SCNC: 6 MMOL/L
AST SERPL W P-5'-P-CCNC: 20 U/L (ref 13–39)
ATRIAL RATE: 75 BPM
BACTERIA UR CULT: NORMAL
BACTERIA UR QL AUTO: ABNORMAL /HPF
BASOPHILS # BLD AUTO: 0.03 THOUSANDS/ÂΜL (ref 0–0.1)
BASOPHILS NFR BLD AUTO: 0 % (ref 0–1)
BILIRUB SERPL-MCNC: 0.39 MG/DL (ref 0.2–1)
BILIRUB UR QL STRIP: NEGATIVE
BUN SERPL-MCNC: 29 MG/DL (ref 5–25)
CALCIUM ALBUM COR SERPL-MCNC: 9.3 MG/DL (ref 8.3–10.1)
CALCIUM SERPL-MCNC: 8.8 MG/DL (ref 8.4–10.2)
CARDIAC TROPONIN I PNL SERPL HS: 6 NG/L
CARDIAC TROPONIN I PNL SERPL HS: 9 NG/L
CHLORIDE SERPL-SCNC: 103 MMOL/L (ref 96–108)
CLARITY UR: CLEAR
CO2 SERPL-SCNC: 28 MMOL/L (ref 21–32)
COLOR UR: ABNORMAL
CREAT SERPL-MCNC: 2.37 MG/DL (ref 0.6–1.3)
EOSINOPHIL # BLD AUTO: 0.19 THOUSAND/ÂΜL (ref 0–0.61)
EOSINOPHIL NFR BLD AUTO: 2 % (ref 0–6)
ERYTHROCYTE [DISTWIDTH] IN BLOOD BY AUTOMATED COUNT: 15.6 % (ref 11.6–15.1)
GFR SERPL CREATININE-BSD FRML MDRD: 19 ML/MIN/1.73SQ M
GLUCOSE SERPL-MCNC: 121 MG/DL (ref 65–140)
GLUCOSE UR STRIP-MCNC: ABNORMAL MG/DL
HCT VFR BLD AUTO: 36.1 % (ref 34.8–46.1)
HGB BLD-MCNC: 12 G/DL (ref 11.5–15.4)
HGB UR QL STRIP.AUTO: ABNORMAL
IMM GRANULOCYTES # BLD AUTO: 0.06 THOUSAND/UL (ref 0–0.2)
IMM GRANULOCYTES NFR BLD AUTO: 1 % (ref 0–2)
KETONES UR STRIP-MCNC: NEGATIVE MG/DL
LEUKOCYTE ESTERASE UR QL STRIP: ABNORMAL
LYMPHOCYTES # BLD AUTO: 2.02 THOUSANDS/ÂΜL (ref 0.6–4.47)
LYMPHOCYTES NFR BLD AUTO: 17 % (ref 14–44)
MCH RBC QN AUTO: 29.9 PG (ref 26.8–34.3)
MCHC RBC AUTO-ENTMCNC: 33.2 G/DL (ref 31.4–37.4)
MCV RBC AUTO: 90 FL (ref 82–98)
MONOCYTES # BLD AUTO: 0.75 THOUSAND/ÂΜL (ref 0.17–1.22)
MONOCYTES NFR BLD AUTO: 6 % (ref 4–12)
NEUTROPHILS # BLD AUTO: 9.02 THOUSANDS/ÂΜL (ref 1.85–7.62)
NEUTS SEG NFR BLD AUTO: 74 % (ref 43–75)
NITRITE UR QL STRIP: NEGATIVE
NON-SQ EPI CELLS URNS QL MICRO: ABNORMAL /HPF
NRBC BLD AUTO-RTO: 0 /100 WBCS
P AXIS: 55 DEGREES
PH UR STRIP.AUTO: 6.5 [PH]
PLATELET # BLD AUTO: 270 THOUSANDS/UL (ref 149–390)
PMV BLD AUTO: 11.5 FL (ref 8.9–12.7)
POTASSIUM SERPL-SCNC: 3.6 MMOL/L (ref 3.5–5.3)
PR INTERVAL: 212 MS
PROT SERPL-MCNC: 6.1 G/DL (ref 6.4–8.4)
PROT UR STRIP-MCNC: ABNORMAL MG/DL
QRS AXIS: -8 DEGREES
QRSD INTERVAL: 82 MS
QT INTERVAL: 408 MS
QTC INTERVAL: 455 MS
RBC # BLD AUTO: 4.02 MILLION/UL (ref 3.81–5.12)
RBC #/AREA URNS AUTO: ABNORMAL /HPF
SODIUM SERPL-SCNC: 137 MMOL/L (ref 135–147)
SP GR UR STRIP.AUTO: 1.02 (ref 1–1.03)
T WAVE AXIS: 36 DEGREES
UROBILINOGEN UR STRIP-ACNC: <2 MG/DL
VENTRICULAR RATE: 75 BPM
WBC # BLD AUTO: 12.07 THOUSAND/UL (ref 4.31–10.16)
WBC #/AREA URNS AUTO: ABNORMAL /HPF

## 2023-11-07 PROCEDURE — 81001 URINALYSIS AUTO W/SCOPE: CPT | Performed by: EMERGENCY MEDICINE

## 2023-11-07 PROCEDURE — 36415 COLL VENOUS BLD VENIPUNCTURE: CPT | Performed by: EMERGENCY MEDICINE

## 2023-11-07 PROCEDURE — G1004 CDSM NDSC: HCPCS

## 2023-11-07 PROCEDURE — 93005 ELECTROCARDIOGRAM TRACING: CPT

## 2023-11-07 PROCEDURE — 99285 EMERGENCY DEPT VISIT HI MDM: CPT

## 2023-11-07 PROCEDURE — 70450 CT HEAD/BRAIN W/O DYE: CPT

## 2023-11-07 PROCEDURE — 99223 1ST HOSP IP/OBS HIGH 75: CPT | Performed by: INTERNAL MEDICINE

## 2023-11-07 PROCEDURE — 99285 EMERGENCY DEPT VISIT HI MDM: CPT | Performed by: EMERGENCY MEDICINE

## 2023-11-07 PROCEDURE — 93010 ELECTROCARDIOGRAM REPORT: CPT | Performed by: INTERNAL MEDICINE

## 2023-11-07 PROCEDURE — 99223 1ST HOSP IP/OBS HIGH 75: CPT | Performed by: STUDENT IN AN ORGANIZED HEALTH CARE EDUCATION/TRAINING PROGRAM

## 2023-11-07 PROCEDURE — 84484 ASSAY OF TROPONIN QUANT: CPT | Performed by: EMERGENCY MEDICINE

## 2023-11-07 PROCEDURE — 85025 COMPLETE CBC W/AUTO DIFF WBC: CPT | Performed by: EMERGENCY MEDICINE

## 2023-11-07 PROCEDURE — 71046 X-RAY EXAM CHEST 2 VIEWS: CPT

## 2023-11-07 PROCEDURE — 80053 COMPREHEN METABOLIC PANEL: CPT | Performed by: EMERGENCY MEDICINE

## 2023-11-07 PROCEDURE — 87086 URINE CULTURE/COLONY COUNT: CPT | Performed by: EMERGENCY MEDICINE

## 2023-11-07 RX ORDER — FERROUS SULFATE 325(65) MG
325 TABLET ORAL
Status: DISCONTINUED | OUTPATIENT
Start: 2023-11-08 | End: 2023-11-09 | Stop reason: HOSPADM

## 2023-11-07 RX ORDER — GABAPENTIN 100 MG/1
100 CAPSULE ORAL
Status: DISCONTINUED | OUTPATIENT
Start: 2023-11-07 | End: 2023-11-09 | Stop reason: HOSPADM

## 2023-11-07 RX ORDER — HYDRALAZINE HYDROCHLORIDE 20 MG/ML
5 INJECTION INTRAMUSCULAR; INTRAVENOUS EVERY 6 HOURS PRN
Status: DISCONTINUED | OUTPATIENT
Start: 2023-11-07 | End: 2023-11-07

## 2023-11-07 RX ORDER — CEPHALEXIN 250 MG/1
250 CAPSULE ORAL
Status: DISCONTINUED | OUTPATIENT
Start: 2023-11-07 | End: 2023-11-09 | Stop reason: HOSPADM

## 2023-11-07 RX ORDER — MAGNESIUM SULFATE HEPTAHYDRATE 40 MG/ML
2 INJECTION, SOLUTION INTRAVENOUS ONCE
Status: COMPLETED | OUTPATIENT
Start: 2023-11-07 | End: 2023-11-07

## 2023-11-07 RX ORDER — DULOXETIN HYDROCHLORIDE 30 MG/1
30 CAPSULE, DELAYED RELEASE ORAL DAILY
Status: DISCONTINUED | OUTPATIENT
Start: 2023-11-07 | End: 2023-11-09 | Stop reason: HOSPADM

## 2023-11-07 RX ORDER — ACETAMINOPHEN 325 MG/1
650 TABLET ORAL ONCE
Status: DISCONTINUED | OUTPATIENT
Start: 2023-11-07 | End: 2023-11-09 | Stop reason: HOSPADM

## 2023-11-07 RX ORDER — LOSARTAN POTASSIUM 25 MG/1
25 TABLET ORAL DAILY
Status: DISCONTINUED | OUTPATIENT
Start: 2023-11-07 | End: 2023-11-07

## 2023-11-07 RX ORDER — METOPROLOL TARTRATE 50 MG/1
50 TABLET, FILM COATED ORAL EVERY 12 HOURS SCHEDULED
Status: DISCONTINUED | OUTPATIENT
Start: 2023-11-07 | End: 2023-11-09 | Stop reason: HOSPADM

## 2023-11-07 RX ORDER — PANTOPRAZOLE SODIUM 40 MG/1
40 TABLET, DELAYED RELEASE ORAL
Status: DISCONTINUED | OUTPATIENT
Start: 2023-11-07 | End: 2023-11-09 | Stop reason: HOSPADM

## 2023-11-07 RX ORDER — CLOPIDOGREL BISULFATE 75 MG/1
75 TABLET ORAL DAILY
Status: DISCONTINUED | OUTPATIENT
Start: 2023-11-07 | End: 2023-11-09 | Stop reason: HOSPADM

## 2023-11-07 RX ORDER — LEVOTHYROXINE SODIUM 0.12 MG/1
125 TABLET ORAL
Status: DISCONTINUED | OUTPATIENT
Start: 2023-11-08 | End: 2023-11-09 | Stop reason: HOSPADM

## 2023-11-07 RX ORDER — AMLODIPINE BESYLATE 5 MG/1
5 TABLET ORAL DAILY
Status: DISCONTINUED | OUTPATIENT
Start: 2023-11-07 | End: 2023-11-07

## 2023-11-07 RX ORDER — PRAMIPEXOLE DIHYDROCHLORIDE 0.25 MG/1
0.25 TABLET ORAL
Status: DISCONTINUED | OUTPATIENT
Start: 2023-11-07 | End: 2023-11-09 | Stop reason: HOSPADM

## 2023-11-07 RX ORDER — PRAVASTATIN SODIUM 40 MG
40 TABLET ORAL
Status: DISCONTINUED | OUTPATIENT
Start: 2023-11-07 | End: 2023-11-09 | Stop reason: HOSPADM

## 2023-11-07 RX ORDER — FAMOTIDINE 20 MG/1
10 TABLET, FILM COATED ORAL DAILY
Status: DISCONTINUED | OUTPATIENT
Start: 2023-11-07 | End: 2023-11-09 | Stop reason: HOSPADM

## 2023-11-07 RX ORDER — HYDRALAZINE HYDROCHLORIDE 20 MG/ML
5 INJECTION INTRAMUSCULAR; INTRAVENOUS EVERY 6 HOURS PRN
Status: DISCONTINUED | OUTPATIENT
Start: 2023-11-07 | End: 2023-11-09 | Stop reason: HOSPADM

## 2023-11-07 RX ORDER — METOCLOPRAMIDE HYDROCHLORIDE 5 MG/ML
5 INJECTION INTRAMUSCULAR; INTRAVENOUS ONCE
Status: DISCONTINUED | OUTPATIENT
Start: 2023-11-07 | End: 2023-11-09 | Stop reason: HOSPADM

## 2023-11-07 RX ORDER — ALBUTEROL SULFATE 90 UG/1
2 AEROSOL, METERED RESPIRATORY (INHALATION) EVERY 6 HOURS PRN
Status: DISCONTINUED | OUTPATIENT
Start: 2023-11-07 | End: 2023-11-09 | Stop reason: HOSPADM

## 2023-11-07 RX ADMIN — GABAPENTIN 100 MG: 100 CAPSULE ORAL at 21:59

## 2023-11-07 RX ADMIN — LOSARTAN POTASSIUM 25 MG: 25 TABLET, FILM COATED ORAL at 14:44

## 2023-11-07 RX ADMIN — DULOXETINE HYDROCHLORIDE 30 MG: 30 CAPSULE, DELAYED RELEASE ORAL at 14:56

## 2023-11-07 RX ADMIN — PRAMIPEXOLE DIHYDROCHLORIDE 0.25 MG: 0.25 TABLET ORAL at 21:59

## 2023-11-07 RX ADMIN — AMLODIPINE BESYLATE 5 MG: 5 TABLET ORAL at 14:35

## 2023-11-07 RX ADMIN — METOPROLOL TARTRATE 50 MG: 50 TABLET, FILM COATED ORAL at 21:59

## 2023-11-07 RX ADMIN — MAGNESIUM SULFATE HEPTAHYDRATE 2 G: 40 INJECTION, SOLUTION INTRAVENOUS at 16:56

## 2023-11-07 RX ADMIN — CLOPIDOGREL BISULFATE 75 MG: 75 TABLET ORAL at 14:38

## 2023-11-07 RX ADMIN — CEPHALEXIN 250 MG: 250 CAPSULE ORAL at 21:59

## 2023-11-07 RX ADMIN — PRAVASTATIN SODIUM 40 MG: 40 TABLET ORAL at 16:48

## 2023-11-07 RX ADMIN — METOPROLOL TARTRATE 50 MG: 50 TABLET, FILM COATED ORAL at 14:43

## 2023-11-07 RX ADMIN — ASPIRIN 81 MG: 81 TABLET, COATED ORAL at 14:36

## 2023-11-07 RX ADMIN — FAMOTIDINE 10 MG: 20 TABLET ORAL at 14:40

## 2023-11-07 RX ADMIN — PANTOPRAZOLE SODIUM 40 MG: 40 TABLET, DELAYED RELEASE ORAL at 16:48

## 2023-11-07 NOTE — ASSESSMENT & PLAN NOTE
Recently admitted on 10/27/2023 with stroke alert status post TNK   At that time patient had NIHSS  14, have some confusion at baseline. MRI brain 10/30  foci of late acute infarct involving the right putamen. Has been on dual antiplatelet therapy aspirin and Plavix, statin. We will continue these medications. Patient returned on 11/1/2023 with complaints of headache, repeat CT head on 11/1/2023 was negative. Will obtain CT head stat today, will consult neurology as patient is having recurrent headaches.

## 2023-11-07 NOTE — ASSESSMENT & PLAN NOTE
Lab Results   Component Value Date    EGFR 19 11/07/2023    EGFR 19 11/01/2023    EGFR 20 10/30/2023    CREATININE 2.37 (H) 11/07/2023    CREATININE 2.42 (H) 11/01/2023    CREATININE 2.33 (H) 10/30/2023   Renal function at baseline. Monitor.

## 2023-11-07 NOTE — ASSESSMENT & PLAN NOTE
69-year-old female with prior stroke on DAPT, CKD, COPD, diabetes, hypertension, hyperlipidemia, hypothyroidism, neuropathy, RLS, arthritis, GERD, depression, who presents with headache and elevated BP. BP on presentation 176/84 with SBP max 193. Unclear etiology at this time- headache may be in the setting of elevated BP. No focal deficits noted on exam. Patient is a poor historian due to underlying cognitive impairment but denies headache upon re-evaluation with attending neurologist. MRI brain wo contrast without acute changes noted. Plan:  - Continue DAPT with aspirin 81 mg and Plavix 75 mg x 21 days, then transition to aspirin monotherapy (11/20/23) per previous admission  - Headache management:  S/p Magnesium sulfate 2 g IV x 1  S/p Tylenol 650 mg x 1  - Goal normotension; avoid hypotension  - PT/OT  - Monitor neuro exam; notify with any changes  - Medical management and supportive care per primary team. Correction of any metabolic or infectious disturbances.  - No further inpatient neurology recommendations at this time. Please call with any questions. Results:  - CT head: No acute intracranial abnormality.  - MRI brain:   No acute infarction. Stable moderate cerebral chronic microangiopathic changes when comparing to the October 30, 2023 study.   - Labs: WBC 12.07, creatinine 2.37, urine culture with mixed contaminants

## 2023-11-07 NOTE — ASSESSMENT & PLAN NOTE
- DAPT x 21 days, then transition to aspirin monotherapy on 11/20/23  - Continue statin  - Monitor neuro exam; notify with any changes  - MRI brain without acute infarct noted

## 2023-11-07 NOTE — ASSESSMENT & PLAN NOTE
- BP on presentation 176/84 with SBP max 193  - Goal normotension  - Medical management per primary team

## 2023-11-07 NOTE — ASSESSMENT & PLAN NOTE
Lab Results   Component Value Date    HGBA1C 7.1 (H) 10/28/2023       No results for input(s): "POCGLU" in the last 72 hours. Blood Sugar Average: Last 72 hrs:  Sliding scale insulin.

## 2023-11-07 NOTE — CONSULTS
Consultation - Neurology   Laron Knox 75 y.o. female MRN: [id number]  Unit/Bed#: ED-29 Encounter: [id number]      Assessment/Plan     Headache  Assessment & Plan  78-year-old female with prior stroke on DAPT, CKD, COPD, diabetes, hypertension, hyperlipidemia, hypothyroidism, neuropathy, RLS, arthritis, GERD, depression, who presents with headache and elevated BP. BP on presentation 176/84 with SBP max 193. Unclear etiology at this time. No focal deficits noted on exam. Will obtain MRI brain for further evaluation. Plan:  - Continue DAPT with aspirin 81 mg and Plavix 75 mg x 21 days, then transition to aspirin monotherapy (11/20/23) per previous admission  - MRI brain wo contrast  - Headache management:  Magnesium sulfate 2 g IV x 1  Tylenol 650 mg x 1  - Permissive HTN for now until MRI completed and without acute infarct noted  - Urine culture pending  - Monitor neuro exam; notify with any changes  - Medical management and supportive care per primary team. Correction of any metabolic or infectious disturbances. Results:  - CT head: No acute intracranial abnormality.  - Labs: WBC 12.07, creatinine 2.37    Stroke [facility])  Assessment & Plan  - DAPT x 21 days, then transition to aspirin monotherapy on 11/20/23  - Continue statin  - Monitor neuro exam; notify with any changes  - MRI brain    * HTN (hypertension)  Assessment & Plan  - BP on presentation 176/84 with SBP max 193  - Permissive HTN for now and if MRI brain unremarkable, then goal normotension  - Medical management per primary team      Recommendations for outpatient neurological follow up have yet to be determined. Case and treatment plan reviewed with attending neurologist, Dr. Jaymie Bolden. Please see attending attestation for any further recommendations.       History of Present Illness     Reason for Consult / Principal Problem: Headache  Hx and PE limited by: Poor historian, drowsiness  HPI: Laron Knox is a 75 y.o.  female with prior stroke on DAPT, CKD, COPD, DM, neuropathy, hypothyroidism, HTN, HLD, RLS, arthritis, GERD, depression, who presents with headache. Patient is a poor historian due to drowsiness. She states she woke up this morning with a headache in the front of her head but is unable to provide any further details. Per H&P, patient's  reported patient has been having elevated BP and reporting intermittent headaches since her recent discharge from the hospital. He also reported patient has been intermittently confused x 1.5 month. BP on presentation 176/84. Patient was recently seen by neurology at the end of October 2023 for headache and aphasia. She had received TNK and was placed on stroke pathway for further evaluation. BP on presentation at that time 186/88. MRI demonstrated right putamen infarct. Etiology concerning for cardioembolic, therefore, patient was discharged on DAPT x 21 days with transition to aspirin monotherapy and recommended to obtain long-term cardiac monitoring outpatient. She was then seen in the ED on 11/1 for R sided headache and nausea. Patient's symptoms improved after receiving migraine cocktail (Decadron 10 mg, IV fluids) in ED and was subsequently discharged home. Of note, patient was seen by neurology in 01/2023 for headache and new onset confusion. Initial concern was for GCA so patient was started on steroids and underwent temporal artery biopsy, but patient then developed rash on face that was concerning for herpes zoster and was subsequently started on valcyclovir. Inpatient consult to Neurology  Consult performed by: RAMIRO Dorantes  Consult ordered by: Delia Holland MD          Review of Systems  A 12 system ROS was completed. Other than the above mentioned complaints in the HPI and those commented on below, all remaining systems were negative.     Historical Information   Past Medical History:   Diagnosis Date    Actinic keratosis     Acute cystitis without hematuria 04/28/2023 Acute cystitis without hematuria 04/28/2023    Acute-on-chronic kidney injury      NIKOS (acute kidney injury) ([address]) 05/08/2020    Allergic     Allergic rhinitis     Ambulatory dysfunction 10/22/2020    AMS (altered mental status) 07/14/2020    Anesthesia     pt reports "had to use double lumen endo tube for hiatal hernia repair/so surgeon could get to where he needed to work"    Arthritis     Aspiration into airway     Michael esophagus 1993    Lot of stress with children    Basal cell carcinoma 2007    left cheek     BCC (basal cell carcinoma) 05/27/2021    Left Nasal tip    Breast discharge 06/19/2023    Breast pain 06/19/2023    Cancer ([address])     squamous cell cancer on forhead    Cancer ([address])     basal cell on nose     Cholelithiasis     Chronic kidney disease 2000, 2018    Stones, kidney disease stage 4    Chronic pain disorder     bilat feet and joint pain on occas    Colon polyp     Constipation     COPD (chronic obstructive pulmonary disease) ([address])     COVID-19 08/2021    recovered at home/did receive monoclonal infusion    COVID-19 virus infection 08/16/2021    Dental bridge present     Depression     Diabetes mellitus ([address])     Type 2    Diabetic neuropathy ([address])     Difficulty walking 2017    Disease of thyroid gland     Dyspnea     Elevated liver enzymes 04/04/2023    Epigastric abdominal pain with severe diabetic gastroparesis, status post EGD/Botox injection, 5/14 05/17/2021    Facial abrasion, initial encounter 03/22/2023    Fall 03/22/2023    Family history of thyroid problem     Fatty liver     Fibromyalgia, primary     Fracture of orbital floor, blow-out, right, closed, with routine healing, subsequent encounter 03/22/2023    GERD (gastroesophageal reflux disease)     Heart burn     Hiatal hernia     History of colon polyps 07/30/2021    History of kidney stones 09/29/2020    Hx of recurrent kidney stones    History of kidney stones 09/29/2020    Hx of recurrent kidney stones  Formatting of this note might be different from the original. Hx of recurrent kidney stones  Last Assessment & Plan:  Formatting of this note might be different from the original. We will set her up for follow-up in 3 months with a KUB prior. She knows to call if she has any kidney stone type pain in the meantime.     History of pneumonia     History of repair of hiatal hernia 05/03/2020    Hyperlipidemia     Hyperplasia, parathyroid ([address])     Hypertension     Hyponatremia 04/26/2023    Hypotension 04/13/2022    Kidney problem     Kidney stone     Leukocytosis 07/14/2020    Memory loss Julu2 2020    Motion sickness     Motion sickness     Multiple closed fractures of ribs of right side 03/22/2023    Nasal bones, closed fracture 03/22/2023    Neck pain     Obesity 1978    Obesity (BMI 30.0-34.9)     Other chest pain 09/13/2021    Peripheral neuropathy     Pollen allergies     RLS (restless legs syndrome)     S/P foot surgery 07/20/2022    SCC (squamous cell carcinoma) 05/04/2021    left mid forehead    SCC (squamous cell carcinoma) 2023    chest    Seasonal allergies     Shingles 01 05 23    Sleep apnea     has inspire    Squamous cell skin cancer 2007    left cheek     Swollen ankles     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, left     great toe fusion    Urinary incontinence     Urinary tract infection 03/28/2022    Wears glasses      Past Surgical History:   Procedure Laterality Date    ABDOMINAL SURGERY  [id number]    Nissen x2 1972 tubal ligarion    ARTHROSCOPY KNEE      BREAST BIOPSY      stereotactic-benign    BREAST BIOPSY      stereo-benign    BREAST CYST EXCISION Bilateral     benign    BREAST EXCISIONAL BIOPSY      unknown date-benign    BREAST EXCISIONAL BIOPSY      unknown date-benign    BREAST EXCISIONAL BIOPSY      unknown date-benign    BREAST EXCISIONAL BIOPSY      unknown age-benign    CARDIAC CATHETERIZATION      CHOLECYSTECTOMY      COLONOSCOPY      EXAMINATION UNDER ANESTHESIA N/A 06/24/2021    Procedure: EXAM UNDER ANESTHESIA (EUA), DISE;  Surgeon: Phebe Duverney, MD;  Location: [facility];  Service: ENT    HERNIA REPAIR      HIATAL HERNIA REPAIR      KNEE SURGERY      Torn maniscus lap surg    LIPOSUCTION      LYMPH NODE BIOPSY      MOHS SURGERY  2021    left mid forehead-Mickey    MOHS SURGERY Left 2021    Left nasal tip- mickey     NJ LIGATION/BIOPSY TEMPORAL ARTERY Left 2023    Procedure: BIOPSY ARTERY TEMPORAL;  Surgeon: Paola Jurado DO;  Location: [facility];  Service: Vascular    NJ OPEN IMPLANTATION CRANIAL NERVE BOOM & PULSE GEN N/A 11/10/2021    Procedure: INSERTION UPPER AIRWAY STIMULATOR, INSPIRE IMPLANT;  Surgeon: Phebe Duverney, MD;  Location: [facility];  Service: ENT    NJ UNLISTED PROCEDURE FOOT/TOES Right 2022    Procedure: 1st metatarsal phalangeal joint fusion;  Surgeon: Marcella Lee DPM;  Location: [facility];  Service: [address] [place] [address] Bilateral 2000    REDUCTION MAMMAPLASTY      SKIN BIOPSY      SKIN CANCER EXCISION      squamous cell carcinoma     SKIN CANCER EXCISION      basal cell carcinoma    SKIN CANCER EXCISION      SCCIS chest    SQUAMOUS CELL CARCINOMA EXCISION      TOE SURGERY Right 2022    Right Great Toe Fusion    TONSILLECTOMY      TUBAL LIGATION      UPPER GASTROINTESTINAL ENDOSCOPY      US GUIDED BREAST BIOPSY RIGHT COMPLETE Right 2022     Social History   Social History     Substance and Sexual Activity   Alcohol Use Yes    Comment: 1 or 2 a year     Social History     Substance and Sexual Activity   Drug Use No     E-Cigarette/Vaping    E-Cigarette Use Never User      E-Cigarette/Vaping Substances    Nicotine No     THC No     CBD No     Flavoring No     Other No     Unknown No      Social History     Tobacco Use   Smoking Status Former    Packs/day: 2.00    Years: 10.00    Total pack years: 20.00    Types: Cigarettes    Start date: 1966    Quit date: 1976    Years since quittin.8    Passive exposure: Past   Smokeless Tobacco Never   Tobacco Comments    Smoked 2 pack a day     Family History:   Family History   Problem Relation Age of Onset    Heart disease Mother     Depression Mother     Hypertension Mother     COPD Mother     Hearing loss Mother     Anxiety disorder Mother     Heart disease Father     Lung cancer Father 79        Smoker     Cancer Father         brain    Alcohol abuse Father     Dementia Father     Hypertension Father     Thyroid disease Father     COPD Father     Arthritis Father     Brain cancer Father 76    Hypertension Sister     Diabetes Sister     Heart disease Sister     Thyroid disease Sister     Cancer Sister         Lympoma, lung    Lung cancer Sister 78    Anxiety disorder Sister     Migraines Sister     Hypertension Brother     Diabetes Brother     Cancer Brother         Throat    Dementia Brother     Stroke Brother     Hypertension Brother     Heart disease Brother     Diabetes Brother     Hypertension Brother     No Known Problems Son     No Known Problems Son     Brain cancer Paternal Aunt         unknown age    Diabetes Sister     Hypertension Sister     Diabetes Brother     Breast cancer Neg Hx        Review of previous medical records was completed.     Meds/Allergies   all current active meds have been reviewed, current meds:   Current Facility-Administered Medications   Medication Dose Route Frequency    acetaminophen (TYLENOL) tablet 650 mg  650 mg Oral Once    albuterol (PROVENTIL HFA,VENTOLIN HFA) inhaler 2 puff  2 puff Inhalation Q6H PRN    amLODIPine (NORVASC) tablet 5 mg  5 mg Oral Daily    aspirin (ECOTRIN LOW STRENGTH) EC tablet 81 mg  81 mg Oral Daily    cephalexin (KEFLEX) capsule 250 mg  250 mg Oral HS    clopidogrel (PLAVIX) tablet 75 mg  75 mg Oral Daily    cyanocobalamin injection 1,000 mcg  1,000 mcg Intramuscular Q30 Days    cyanocobalamin injection 1,000 mcg  1,000 mcg Intramuscular Q30 Days    DULoxetine (CYMBALTA) delayed release capsule 30 mg  30 mg Oral Daily    famotidine (PEPCID) tablet 10 mg  10 mg Oral Daily    [START ON 11/8/2023] ferrous sulfate tablet 325 mg  325 mg Oral Daily With Breakfast    gabapentin (NEURONTIN) capsule 100 mg  100 mg Oral HS    hydrALAZINE (APRESOLINE) injection 5 mg  5 mg Intravenous Q6H PRN    [START ON 11/8/2023] levothyroxine tablet 125 mcg  125 mcg Oral Early Morning    losartan (COZAAR) tablet 25 mg  25 mg Oral Daily    magnesium sulfate 2 g/50 mL IVPB (premix) 2 g  2 g Intravenous Once    metoclopramide (REGLAN) injection 5 mg  5 mg Intravenous Once    metoprolol tartrate (LOPRESSOR) tablet 50 mg  50 mg Oral Q12H ELISHA    pantoprazole (PROTONIX) EC tablet 40 mg  40 mg Oral BID AC    pramipexole (MIRAPEX) tablet 0.25 mg  0.25 mg Oral HS    pravastatin (PRAVACHOL) tablet 40 mg  40 mg Oral Daily With Dinner   , and PTA meds:   Prior to Admission Medications   Prescriptions Last Dose Informant Patient Reported? Taking? B-D ULTRAFINE III SHORT PEN 31G X 8 MM MISC  Self, Spouse/Significant Other Yes No   Calcium Citrate 1040 MG TABS  Self, Spouse/Significant Other Yes No   Sig: Take 500 mg by mouth Three times a day   Coenzyme Q10 (CoQ10) 100 MG CAPS  Self, Spouse/Significant Other Yes No   Sig: Take 100 mg by mouth in the morning   DULoxetine (CYMBALTA) 30 mg delayed release capsule  Self, Spouse/Significant Other No No   Sig: Take 1 capsule (30 mg total) by mouth daily   DULoxetine (CYMBALTA) 30 mg delayed release capsule   No No   Sig: Take 1 capsule (30 mg total) by mouth daily Do not start before October 31, 2023.    Icosapent Ethyl 1 g CAPS  Self, Spouse/Significant Other Yes No   Sig: Take 1 capsule by mouth 2 (two) times a day   Lactobacillus (PROBIOTIC ACIDOPHILUS PO)  Self, Spouse/Significant Other Yes No   Sig: Take 1 capsule by mouth 2 (two) times a day   Probiotic Product (PROBIOTIC PO)  Self, Spouse/Significant Other Yes No   Sig: Take 1 capsule by mouth 2 (two) times a day   acetaminophen (TYLENOL) 325 mg tablet  Self, Spouse/Significant Other No No   Sig: Take 3 tablets (975 mg total) by mouth every 8 (eight) hours   Patient not taking: Reported on 10/31/2023   albuterol (Ventolin HFA) 90 mcg/act inhaler  Self, Spouse/Significant Other No No   Sig: Inhale 2 puffs every 6 (six) hours as needed for wheezing   amLODIPine (NORVASC) 5 mg tablet  Self, Spouse/Significant Other No No   Sig: Take 1 tablet (5 mg total) by mouth daily   amLODIPine (NORVASC) 5 mg tablet   No No   Sig: Take 1 tablet (5 mg total) by mouth daily Do not start before October 31, 2023. aspirin (ECOTRIN LOW STRENGTH) 81 mg EC tablet   No No   Sig: Take 1 tablet (81 mg total) by mouth daily Do not start before October 31, 2023. cephalexin (KEFLEX) 250 mg capsule   No No   Sig: Take 1 capsule (250 mg total) by mouth daily at bedtime   cephalexin (KEFLEX) 250 mg capsule   No No   Sig: Take 1 capsule (250 mg total) by mouth daily at bedtime for 12 days   cholecalciferol (VITAMIN D3) 1,000 units tablet  Self, Spouse/Significant Other No No   Sig: Take 2 tablets (2,000 Units total) by mouth daily   cholecalciferol (VITAMIN D3) 1,000 units tablet   No No   Sig: Take 1 tablet (1,000 Units total) by mouth daily Do not start before October 31, 2023. clopidogrel (PLAVIX) 75 mg tablet   Yes No   Sig: Take 75 mg by mouth daily Once daily for 21 days   clopidogrel (PLAVIX) 75 mg tablet   No No   Sig: Take 1 tablet (75 mg total) by mouth daily for 21 days   estradiol (ESTRACE VAGINAL) 0.1 mg/g vaginal cream  Self, Spouse/Significant Other No No   Sig: Apply pea sized amount around urethra and inside vaginal opening 3 times weekly   Patient not taking: Reported on 10/10/2023   famotidine (PEPCID) 10 mg tablet  Self, Spouse/Significant Other No No   Sig: Take 1 tablet (10 mg total) by mouth daily Do not start before September 13, 2023.    ferrous sulfate 324 (65 Fe) mg  Self, Spouse/Significant Other No No   Sig: Take 1 tablet (324 mg total) by mouth every other day   ferrous sulfate 325 (65 Fe) mg tablet   No No   Sig: Take 1 tablet (325 mg total) by mouth daily with breakfast Do not start before October 31, 2023. gabapentin (NEURONTIN) 100 mg capsule   No No   Sig: Take 1 capsule (100 mg total) by mouth daily at bedtime   gabapentin (Neurontin) 100 mg capsule  Self, Spouse/Significant Other No No   Sig: Take 2 capsules (200 mg total) by mouth daily at bedtime   insulin aspart (NovoLOG) 100 units/mL injection  Self, Spouse/Significant Other Yes No   Sig: Inject under the skin 3 (three) times a day before meals 18 units, 18 units, 25 units. insulin detemir (LEVEMIR) 100 units/mL subcutaneous injection   No No   Sig: Inject 50 Units under the skin daily at bedtime   insulin detemir (Levemir FlexTouch) 100 Units/mL injection pen  Self, Spouse/Significant Other Yes No   Sig: Inject 50 Units under the skin every evening    insulin lispro (HumaLOG) 100 units/mL injection   No No   Sig: Take 18 units with breakfast and lunch and 25 units with dinner   levothyroxine (Synthroid) 125 mcg tablet  Self, Spouse/Significant Other No No   Sig: Take 1 tablet (125 mcg total) by mouth daily   levothyroxine 125 mcg tablet   No No   Sig: Take 1 tablet (125 mcg total) by mouth daily in the early morning Do not start before October 31, 2023. losartan (COZAAR) 25 mg tablet   No No   Sig: Take 1 tablet (25 mg total) by mouth 2 (two) times a day   losartan (COZAAR) 25 mg tablet   No No   Sig: Take 1 tablet (25 mg total) by mouth daily Do not start before October 31, 2023.    metoprolol tartrate (LOPRESSOR) 50 mg tablet  Self, Spouse/Significant Other No No   Sig: Take 1 tablet (50 mg total) by mouth every 12 (twelve) hours   metoprolol tartrate (LOPRESSOR) 50 mg tablet   No No   Sig: Take 1 tablet (50 mg total) by mouth every 12 (twelve) hours   pantoprazole (PROTONIX) 40 mg tablet  Self, Spouse/Significant Other No No   Sig: Take 1 tablet (40 mg total) by mouth 2 (two) times a day   pantoprazole (PROTONIX) 40 mg tablet   No No   Sig: Take 1 tablet (40 mg total) by mouth 2 (two) times a day before meals   pramipexole (MIRAPEX) 0.25 mg tablet  Self, Spouse/Significant Other No No   Sig: Take 1 tablet (0.25 mg total) by mouth daily at bedtime   pramipexole (MIRAPEX) 0.25 mg tablet   No No   Sig: Take 1 tablet (0.25 mg total) by mouth daily at bedtime   pravastatin (PRAVACHOL) 40 mg tablet   No No   Sig: Take 1 tablet (40 mg total) by mouth daily with dinner   rosuvastatin (CRESTOR) 5 mg tablet  Self, Spouse/Significant Other No No   Sig: Take 1 tablet (5 mg total) by mouth daily   torsemide (DEMADEX) 10 mg tablet  Self, Spouse/Significant Other No No   Sig: Take 0.5 tablets (5 mg total) by mouth every other day Take this medication only if your weight exceeds 172 pounds      Facility-Administered Medications Last Administration Doses Remaining   cyanocobalamin injection 1,000 mcg 10/31/2023 10:53 AM    cyanocobalamin injection 1,000 mcg None recorded           Allergies   Allergen Reactions    Ciprofloxacin Hives    Sulfamethoxazole-Trimethoprim Other (See Comments)     Tongue swelling    Sulfamethoxazole-Trimethoprim Tongue Swelling    Ciprofloxacin Hives and Itching       Objective   Vitals:Blood pressure (!) 193/91, pulse 71, temperature 99.7 °F (37.6 °C), temperature source Oral, resp. rate 18, SpO2 94 %. ,There is no height or weight on file to calculate BMI. No intake or output data in the 24 hours ending 11/07/23 1517    Invasive Devices: Invasive Devices       Peripheral Intravenous Line  Duration             Peripheral IV 11/07/23 Left Antecubital <1 day                    Physical Exam  Vitals and nursing note reviewed. Constitutional:       General: She is not in acute distress. Appearance: She is not ill-appearing or diaphoretic. Comments: Drowsy but arousable to stimuli   HENT:      Head: Normocephalic.       Mouth/Throat:      Mouth: Mucous membranes are moist.      Pharynx: Oropharynx is clear. Eyes:      General: No scleral icterus. Right eye: No discharge. Left eye: No discharge. Extraocular Movements: Extraocular movements intact and EOM normal.      Conjunctiva/sclera: Conjunctivae normal.   Cardiovascular:      Rate and Rhythm: Normal rate. Pulmonary:      Effort: Pulmonary effort is normal. No respiratory distress. Musculoskeletal:         General: Normal range of motion. Cervical back: Normal range of motion. Skin:     General: Skin is warm and dry. Coloration: Skin is not jaundiced or pale. Neurological:      Mental Status: She is oriented to person, place, and time. Coordination: Finger-Nose-Finger Test normal.       Neurologic Exam     Mental Status   Oriented to person, place, and time. Level of consciousness: alert  Drowsy but arousable to verbal and tactile stimuli, although requires repeated stimuli to stay awake intermittently. Able to state she is at [facility]. Unable to state the correct year, even with choices. Able to name objects correctly. No dysarthria noted. Able to follow commands with encouragement but had difficulty with more complex commands. Cranial Nerves     CN II   Visual fields full to confrontation. Right visual field deficit: none  Left visual field deficit: none     CN III, IV, VI   Extraocular motions are normal.     CN V   Facial sensation intact. CN VII   Facial expression full, symmetric.      CN VIII   Hearing: intact    CN IX, X   Palate: symmetric    CN XI   CN XI normal.     CN XII   CN XII normal.     Motor Exam   Muscle bulk: normal  Bilateral UE strength 5/5 deltoids, biceps, triceps, hand   Bilateral LE strength 5-/5 hip flexion, 5/5 dorsiflexion     Sensory Exam   Light touch normal.     Gait, Coordination, and Reflexes     Coordination   Finger to nose coordination: normal    Tremor   Resting tremor: absent  Intention tremor: absent      Lab Results: I have personally reviewed pertinent reports. , CBC:   Results from last 7 days   Lab Units 11/07/23  0809 11/01/23  1427   WBC Thousand/uL 12.07* 11.66*   RBC Million/uL 4.02 4.56   HEMOGLOBIN g/dL 12.0 13.3   HEMATOCRIT % 36.1 40.6   MCV fL 90 89   PLATELETS Thousands/uL 270 305   , BMP/CMP:   Results from last 7 days   Lab Units 11/07/23  0809 11/01/23  1427   SODIUM mmol/L 137 135   POTASSIUM mmol/L 3.6 3.7   CHLORIDE mmol/L 103 104   CO2 mmol/L 28 24   BUN mg/dL 29* 24   CREATININE mg/dL 2.37* 2.42*   CALCIUM mg/dL 8.8 8.3*   AST U/L 20 28   ALT U/L 27 20   ALK PHOS U/L 55 57   EGFR ml/min/1.73sq m 19 19   , Vitamin B12:   , HgBA1C:   , TSH:   , Coagulation:   , Lipid Profile:   , Ammonia:   , Urinalysis:   Results from last 7 days   Lab Units 11/07/23  1203 11/06/23  1003   COLOR UA  Light Yellow Light Yellow   CLARITY UA  Clear Clear   SPEC GRAV UA  1.020 1.024   PH UA  6.5 6.5   LEUKOCYTES UA  Trace* Negative   NITRITE UA  Negative Negative   GLUCOSE UA mg/dl 100 (1/10%)* 200 (1/5%)*   KETONES UA mg/dl Negative Negative   BILIRUBIN UA  Negative Negative   BLOOD UA  Small* Trace*   , Drug Screen:   , Medication Drug Levels:       Invalid input(s): "CARBAMAZEPINE", "LACOSAMIDE", "OXCARBAZEPINE"    Imaging Studies: I have personally reviewed pertinent reports. and I have personally reviewed pertinent films in PACS  EKG, Pathology, and Other Studies: I have personally reviewed pertinent reports.     VTE Prophylaxis: Reason for no pharmacologic prophylaxis Patient currently in ED    Code Status: Level 3 - DNAR and DNI  Advance Directive and Living Will: Yes    Power of :    POLST:

## 2023-11-07 NOTE — H&P
[address] [facility]  H&P  Name: Julio Barfield 75 y.o. female I MRN: [id number]  Unit/Bed#: ED-29 I Date of Admission: 11/7/2023   Date of Service: 11/7/2023 I Hospital Day: 0      Assessment/Plan   * HTN (hypertension)  Assessment & Plan  Hypertensive urgency   patient presents with complaints of elevated blood pressure and headache, did not receive any medication in the emergency department. I have seen and examined the patient at the bedside around 1:45 PM, she still complains of headache frontal.  Per ED physician patient felt to be somewhat confused while she was in the emergency department-and hence requested for admission. Patient recently had stroke, discharged on 10/30/2023. Will order IV hydralazine 5 mg, reorder oral medications. Will obtain stat CT head. Have some leukocytosis, UA negative for any infection. Per patient  for confusion has been going on for more than a month    Type 2 diabetes mellitus ([address])  Assessment & Plan  Lab Results   Component Value Date    HGBA1C 7.1 (H) 10/28/2023       No results for input(s): "POCGLU" in the last 72 hours. Blood Sugar Average: Last 72 hrs:  Sliding scale insulin. CKD (chronic kidney disease) stage 4, GFR 15-29 ml/min [facility])  Assessment & Plan  Lab Results   Component Value Date    EGFR 19 11/07/2023    EGFR 19 11/01/2023    EGFR 20 10/30/2023    CREATININE 2.37 (H) 11/07/2023    CREATININE 2.42 (H) 11/01/2023    CREATININE 2.33 (H) 10/30/2023   Renal function at baseline. Monitor. HLD (hyperlipidemia)  Assessment & Plan  Continue on statin. Stroke [facility])  Assessment & Plan  Recently admitted on 10/27/2023 with stroke alert status post TNK   At that time patient had NIHSS  14, have some confusion at baseline. MRI brain 10/30  foci of late acute infarct involving the right putamen. Has been on dual antiplatelet therapy aspirin and Plavix, statin. We will continue these medications.   Patient returned on 11/1/2023 with complaints of headache, repeat CT head on 11/1/2023 was negative. Will obtain CT head stat today, will consult neurology as patient is having recurrent headaches. VTE Prophylaxis: Heparin subcu/ sequential compression device   Code Status: DNR  POLST: There is no POLST form on file for this patient (pre-hospital)  Discussion with family: Patient's  over the phone    Anticipated Length of Stay:  Patient will be admitted on an Inpatient basis with an anticipated length of stay of more than 2 midnights. Justification for Hospital Stay: Recurrent headaches, elevated blood pressure    Total Time for Visit, including Counseling / Coordination of Care:  75 minutes . Greater than 50% of this total time spent on direct patient counseling and coordination of care. Chief Complaint:   Elevated blood pressure, headache    History of Present Illness:    Judith Foster is a 75 y.o. female who presents with complaints of elevated blood pressure, headache. Patient  provides majority of the history, patient is a poor historian. Per her  she has been having elevated blood pressure and also noted to have intermittent headaches since her discharge from the hospital.  She has been intermittently confused for almost a month and a half, was worked up in the outpatient with MRI which did not reveal any significant information per her . Being she has been confused as well. She herself complains of headache, frontal, denies any blurry vision, nausea, vomiting. Denies any chest pain, shortness of breath. Denies any urinary or bowel complaints    Review of Systems:    More than 10 system review of systems was performed, pertinent positive and negative findings mentioned as per history of present illness.     Past Medical and Surgical History:     Past Medical History:   Diagnosis Date    Actinic keratosis     Acute cystitis without hematuria 04/28/2023    Acute cystitis without hematuria 04/28/2023    Acute-on-chronic kidney injury      NIKOS (acute kidney injury) ([address]) 05/08/2020    Allergic     Allergic rhinitis     Ambulatory dysfunction 10/22/2020    AMS (altered mental status) 07/14/2020    Anesthesia     pt reports "had to use double lumen endo tube for hiatal hernia repair/so surgeon could get to where he needed to work"    Arthritis     Aspiration into airway     Michael esophagus 1993    Lot of stress with children    Basal cell carcinoma 2007    left cheek     BCC (basal cell carcinoma) 05/27/2021    Left Nasal tip    Breast discharge 06/19/2023    Breast pain 06/19/2023    Cancer ([address])     squamous cell cancer on forhead    Cancer ([address])     basal cell on nose     Cholelithiasis     Chronic kidney disease 2000, 2018    Stones, kidney disease stage 4    Chronic pain disorder     bilat feet and joint pain on occas    Colon polyp     Constipation     COPD (chronic obstructive pulmonary disease) ([address])     COVID-19 08/2021    recovered at home/did receive monoclonal infusion    COVID-19 virus infection 08/16/2021    Dental bridge present     Depression     Diabetes mellitus ([address])     Type 2    Diabetic neuropathy ([address])     Difficulty walking 2017    Disease of thyroid gland     Dyspnea     Elevated liver enzymes 04/04/2023    Epigastric abdominal pain with severe diabetic gastroparesis, status post EGD/Botox injection, 5/14 05/17/2021    Facial abrasion, initial encounter 03/22/2023    Fall 03/22/2023    Family history of thyroid problem     Fatty liver     Fibromyalgia, primary     Fracture of orbital floor, blow-out, right, closed, with routine healing, subsequent encounter 03/22/2023    GERD (gastroesophageal reflux disease)     Heart burn     Hiatal hernia     History of colon polyps 07/30/2021    History of kidney stones 09/29/2020    Hx of recurrent kidney stones    History of kidney stones 09/29/2020    Hx of recurrent kidney stones  Formatting of this note might be different from the original. Hx of recurrent kidney stones  Last Assessment & Plan:  Formatting of this note might be different from the original. We will set her up for follow-up in 3 months with a KUB prior. She knows to call if she has any kidney stone type pain in the meantime.     History of pneumonia     History of repair of hiatal hernia 05/03/2020    Hyperlipidemia     Hyperplasia, parathyroid ([address])     Hypertension     Hyponatremia 04/26/2023    Hypotension 04/13/2022    Kidney problem     Kidney stone     Leukocytosis 07/14/2020    Memory loss Julu2 2020    Motion sickness     Motion sickness     Multiple closed fractures of ribs of right side 03/22/2023    Nasal bones, closed fracture 03/22/2023    Neck pain     Obesity 1978    Obesity (BMI 30.0-34.9)     Other chest pain 09/13/2021    Peripheral neuropathy     Pollen allergies     RLS (restless legs syndrome)     S/P foot surgery 07/20/2022    SCC (squamous cell carcinoma) 05/04/2021    left mid forehead    SCC (squamous cell carcinoma) 2023    chest    Seasonal allergies     Shingles 01 05 23    Sleep apnea     has inspire    Squamous cell skin cancer 2007    left cheek     Swollen ankles     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, left     great toe fusion    Urinary incontinence     Urinary tract infection 03/28/2022    Wears glasses        Past Surgical History:   Procedure Laterality Date    ABDOMINAL SURGERY  6681,[id number],6711    Nissen x2 1972 tubal ligarion    ARTHROSCOPY KNEE      BREAST BIOPSY      stereotactic-benign    BREAST BIOPSY      stereo-benign    BREAST CYST EXCISION Bilateral     benign    BREAST EXCISIONAL BIOPSY      unknown date-benign    BREAST EXCISIONAL BIOPSY      unknown date-benign    BREAST EXCISIONAL BIOPSY      unknown date-benign    BREAST EXCISIONAL BIOPSY      unknown age-benign    CARDIAC CATHETERIZATION      CHOLECYSTECTOMY      COLONOSCOPY      EXAMINATION UNDER ANESTHESIA N/A 06/24/2021    Procedure: EXAM UNDER ANESTHESIA (EUA), DISE; Surgeon: Daphne Aragon MD;  [facility];  Service: ENT    HERNIA REPAIR      HIATAL HERNIA REPAIR      KNEE SURGERY      Torn maniscus lap surg    LIPOSUCTION      LYMPH NODE BIOPSY      MOHS SURGERY  05/20/2021    left mid forehead-Mickey    MOHS SURGERY Left 05/27/2021    Left nasal tip- mickey     IA LIGATION/BIOPSY TEMPORAL ARTERY Left 01/05/2023    Procedure: BIOPSY ARTERY TEMPORAL;  Surgeon: Maya Colindres DO;  [facility];  Service: Vascular    IA OPEN IMPLANTATION CRANIAL NERVE BOOM & PULSE GEN N/A 11/10/2021    Procedure: INSERTION UPPER AIRWAY STIMULATOR, INSPIRE IMPLANT;  Surgeon: Daphne Aragon MD;  Location: [facility];  Service: ENT    IA UNLISTED PROCEDURE FOOT/TOES Right 07/19/2022    Procedure: 1st metatarsal phalangeal joint fusion;  Surgeon: Rylie Phillips DPM;  Location: [facility];  Service: [address] [place] [address] Bilateral 2000    REDUCTION MAMMAPLASTY      SKIN BIOPSY      SKIN CANCER EXCISION  2007    squamous cell carcinoma     SKIN CANCER EXCISION  2007    basal cell carcinoma    SKIN CANCER EXCISION  2023    SCCIS chest    SQUAMOUS CELL CARCINOMA EXCISION      TOE SURGERY Right 08/04/2022    Right Great Toe Fusion    TONSILLECTOMY      TUBAL LIGATION      UPPER GASTROINTESTINAL ENDOSCOPY      US GUIDED BREAST BIOPSY RIGHT COMPLETE Right 07/18/2022       Meds/Allergies:    Prior to Admission medications    Medication Sig Start Date End Date Taking?  Authorizing Provider   acetaminophen (TYLENOL) 325 mg tablet Take 3 tablets (975 mg total) by mouth every 8 (eight) hours  Patient not taking: Reported on 10/31/2023 3/23/23   Candi Flores PA-C   albuterol (Ventolin HFA) 90 mcg/act inhaler Inhale 2 puffs every 6 (six) hours as needed for wheezing 8/4/22   Jamal Glover DO   amLODIPine (NORVASC) 5 mg tablet Take 1 tablet (5 mg total) by mouth daily 5/24/23   Leon Aleman MD   amLODIPine (NORVASC) 5 mg tablet Take 1 tablet (5 mg total) by mouth daily Do not start before October 31, 2023. 10/31/23   Burak Orozco PA-C   aspirin (ECOTRIN LOW STRENGTH) 81 mg EC tablet Take 1 tablet (81 mg total) by mouth daily Do not start before October 31, 2023. 10/31/23   Burak Orozco PA-C   B-D ULTRAFINE III SHORT PEN 31G X 8 MM MISC  7/26/19   Historical Provider, MD   Calcium Citrate 1040 MG TABS Take 500 mg by mouth Three times a day 11/16/22   Historical Provider, MD   cephalexin (KEFLEX) 250 mg capsule Take 1 capsule (250 mg total) by mouth daily at bedtime 10/12/23 11/11/23  Naheed Lozano PA-C   cephalexin (KEFLEX) 250 mg capsule Take 1 capsule (250 mg total) by mouth daily at bedtime for 12 days 10/30/23 11/11/23  Burak Orozco PA-C   cholecalciferol (VITAMIN D3) 1,000 units tablet Take 2 tablets (2,000 Units total) by mouth daily 9/21/23   Katelyn Nieves MD   cholecalciferol (VITAMIN D3) 1,000 units tablet Take 1 tablet (1,000 Units total) by mouth daily Do not start before October 31, 2023. 10/31/23   Burak Orozco PA-C   clopidogrel (PLAVIX) 75 mg tablet Take 75 mg by mouth daily Once daily for 21 days    Historical Provider, MD   clopidogrel (PLAVIX) 75 mg tablet Take 1 tablet (75 mg total) by mouth daily for 21 days 10/30/23 11/20/23  Burak Orozco PA-C   Coenzyme Q10 (CoQ10) 100 MG CAPS Take 100 mg by mouth in the morning    Historical Provider, MD   DULoxetine (CYMBALTA) 30 mg delayed release capsule Take 1 capsule (30 mg total) by mouth daily 9/12/23   Robert Leavitt PA-C   DULoxetine (CYMBALTA) 30 mg delayed release capsule Take 1 capsule (30 mg total) by mouth daily Do not start before October 31, 2023. 10/31/23   Burak Orozco PA-C   estradiol (ESTRACE VAGINAL) 0.1 mg/g vaginal cream Apply pea sized amount around urethra and inside vaginal opening 3 times weekly  Patient not taking: Reported on 10/10/2023 4/12/23   Tracy Young PA-C   famotidine (PEPCID) 10 mg tablet Take 1 tablet (10 mg total) by mouth daily Do not start before September 13, 2023. 9/13/23 10/31/23  Brandy Cali PA-C   ferrous sulfate 324 (65 Fe) mg Take 1 tablet (324 mg total) by mouth every other day 8/21/23 11/19/23  Mariana Gaviria MD   ferrous sulfate 325 (65 Fe) mg tablet Take 1 tablet (325 mg total) by mouth daily with breakfast Do not start before October 31, 2023. 10/31/23   Ace Kirby PA-C   gabapentin (Neurontin) 100 mg capsule Take 2 capsules (200 mg total) by mouth daily at bedtime 9/21/23   Charley Longo MD   gabapentin (NEURONTIN) 100 mg capsule Take 1 capsule (100 mg total) by mouth daily at bedtime 10/30/23   Ace Kirby PA-C   Icosapent Ethyl 1 g CAPS Take 1 capsule by mouth 2 (two) times a day 4/4/23   Historical Provider, MD   insulin aspart (NovoLOG) 100 units/mL injection Inject under the skin 3 (three) times a day before meals 18 units, 18 units, 25 units.     Historical Provider, MD   insulin detemir (Levemir FlexTouch) 100 Units/mL injection pen Inject 50 Units under the skin every evening  6/8/21   Historical Provider, MD   insulin detemir (LEVEMIR) 100 units/mL subcutaneous injection Inject 50 Units under the skin daily at bedtime 10/30/23   Ace Kirby PA-C   insulin lispro (HumaLOG) 100 units/mL injection Take 18 units with breakfast and lunch and 25 units with dinner 10/30/23   Ace Kirby PA-C   Lactobacillus (PROBIOTIC ACIDOPHILUS PO) Take 1 capsule by mouth 2 (two) times a day    Historical Provider, MD   levothyroxine (Synthroid) 125 mcg tablet Take 1 tablet (125 mcg total) by mouth daily 9/11/23   Charley Longo MD   levothyroxine 125 mcg tablet Take 1 tablet (125 mcg total) by mouth daily in the early morning Do not start before October 31, 2023. 10/31/23   Ace Kirby PA-C   losartan (COZAAR) 25 mg tablet Take 1 tablet (25 mg total) by mouth 2 (two) times a day 10/24/23   Bridget Lemus MD   losartan (COZAAR) 25 mg tablet Take 1 tablet (25 mg total) by mouth daily Do not start before October 31, 2023. 10/31/23   Sabra Medina PA-C   metoprolol tartrate (LOPRESSOR) 50 mg tablet Take 1 tablet (50 mg total) by mouth every 12 (twelve) hours 6/6/23   RAMIRO Lazar   metoprolol tartrate (LOPRESSOR) 50 mg tablet Take 1 tablet (50 mg total) by mouth every 12 (twelve) hours 10/30/23   Sabra Medina PA-C   pantoprazole (PROTONIX) 40 mg tablet Take 1 tablet (40 mg total) by mouth 2 (two) times a day 9/22/23   Laila Lopez PA-C   pantoprazole (PROTONIX) 40 mg tablet Take 1 tablet (40 mg total) by mouth 2 (two) times a day before meals 10/30/23   Sabra Medina PA-C   pramipexole (MIRAPEX) 0.25 mg tablet Take 1 tablet (0.25 mg total) by mouth daily at bedtime 2/15/23   RAMIRO Scott   pramipexole (MIRAPEX) 0.25 mg tablet Take 1 tablet (0.25 mg total) by mouth daily at bedtime 10/30/23   Sabra EVE Medina   pravastatin (PRAVACHOL) 40 mg tablet Take 1 tablet (40 mg total) by mouth daily with dinner 10/30/23   Sabra Medina PA-C   Probiotic Product (PROBIOTIC PO) Take 1 capsule by mouth 2 (two) times a day    Historical Provider, MD   rosuvastatin (CRESTOR) 5 mg tablet Take 1 tablet (5 mg total) by mouth daily 5/24/23   Niko White MD   torsemide [facility]) 10 mg tablet Take 0.5 tablets (5 mg total) by mouth every other day Take this medication only if your weight exceeds 172 pounds 8/19/23 12/17/23  Lanette Comer, DO     I have reviewed home medications using allscripts. Allergies:    Allergies   Allergen Reactions    Ciprofloxacin Hives    Sulfamethoxazole-Trimethoprim Other (See Comments)     Tongue swelling    Sulfamethoxazole-Trimethoprim Tongue Swelling    Ciprofloxacin Hives and Itching       Social History:     Marital Status: /Civil Union     Substance Use History:   Social History     Substance and Sexual Activity   Alcohol Use Yes    Comment: 1 or 2 a year     Social History     Tobacco Use   Smoking Status Former    Packs/day: 2.00    Years: 10.00    Total pack years: 20. 00    Types: Cigarettes    Start date: 1966    Quit date: 1976    Years since quittin.8    Passive exposure: Past   Smokeless Tobacco Never   Tobacco Comments    Smoked 2 pack a day     Social History     Substance and Sexual Activity   Drug Use No       Family History:    non-contributory    Physical Exam:     Vitals:   Blood Pressure: (!) 179/90 (23 1249)  Pulse: 71 (23 1249)  Temperature: 99.7 °F (37.6 °C) (23 1256)  Temp Source: Oral (23 1256)  Respirations: 18 (23 1249)  SpO2: 94 % (23 1249)    Physical Exam     General appearance:  Patient not in acute distress  Eyes:  Pupils equal reacting to light  ENT:  Moist oral mucous membranes  CVS:  S1-S2 heard, regular rate and rhythm, no pedal edema  Chest:  Bilateral air entry present, clear to auscultation  Abdomen:  Soft, nontender, bowel sounds present  CNS: Awake, alert, oriented to self and place, not oriented to time in a year does not know current president. No weakness of the upper or lower extremities, symmetrically and generalized weakness noted. Genitourinary: deferred  Skin:  No acute rash   psychiatric:  No psychosis  Musculoskeletal:  No joint deformities     Additional Data:     Lab Results: I have personally reviewed pertinent reports.       Results from last 7 days   Lab Units 23  0809   WBC Thousand/uL 12.07*   HEMOGLOBIN g/dL 12.0   HEMATOCRIT % 36.1   PLATELETS Thousands/uL 270   NEUTROS PCT % 74   LYMPHS PCT % 17   MONOS PCT % 6   EOS PCT % 2     Results from last 7 days   Lab Units 23  0809   SODIUM mmol/L 137   POTASSIUM mmol/L 3.6   CHLORIDE mmol/L 103   CO2 mmol/L 28   BUN mg/dL 29*   CREATININE mg/dL 2.37*   ANION GAP mmol/L 6   CALCIUM mg/dL 8.8   ALBUMIN g/dL 3.4*   TOTAL BILIRUBIN mg/dL 0.39   ALK PHOS U/L 55   ALT U/L 27   AST U/L 20   GLUCOSE RANDOM mg/dL 121         Results from last 7 days   Lab Units 23  1425   POC GLUCOSE mg/dl 86               Imaging: I have personally reviewed pertinent reports. XR chest 2 views   ED Interpretation by Jacqueline Mcmahan MD (11/07 0115)   Per my independent interpretation: No acute cardiopulmonary process      Final Result by Doylene Saint, MD (11/07 7590)      No acute cardiopulmonary disease. Workstation performed: [id number]         CT head wo contrast    (Results Pending)       EKG, Pathology, and Other Studies Reviewed on Admission:   EKG: Reviewed personally by me shows sinus rhythm, no acute ST-T wave changes. [facility] Records Reviewed: Yes     ** Please Note: This note has been constructed using a voice recognition system.  **

## 2023-11-07 NOTE — CASE MANAGEMENT
Case Management Assessment    Patient name Karyle Servant  Location ED-29/ED-29 MRN [id number]  : 1950 Date 2023       Current Admission Date: 2023  Current Admission Diagnosis:HTN (hypertension)   Patient Active Problem List    Diagnosis Date Noted    Cerebrovascular accident (CVA) ([address]) 10/31/2023    Confusion 10/30/2023    Stroke ([address]) 10/27/2023    HTN (hypertension) 10/27/2023    HLD (hyperlipidemia) 10/27/2023    History of Nissen fundoplication     History of cholecystectomy 10/27/2023    SHAYAN (obstructive sleep apnea) 10/27/2023    History of recurrent UTIs 10/27/2023    Dyspnea 10/27/2023    CKD (chronic kidney disease) stage 4, GFR 15-29 ml/min ([address]) 10/27/2023    Hypothyroidism 10/27/2023    Type 2 diabetes mellitus ([address]) 10/27/2023    Left hip pain 10/05/2023    Confusion 10/05/2023    B12 deficiency 2023    Other fatigue 2023    Anemia in stage 4 chronic kidney disease  2023    Secondary hyperparathyroidism of renal origin ([address]) 2023    Parenchymal renal hypertension 2023    Behavioral and psychological symptoms of dementia ([address]) 2023    Postural dizziness with presyncope 2023    Varicose veins of both lower extremities with pain 2023    Palpitations 2023    Anemia 2023    Diabetic nephropathy associated with type 2 diabetes mellitus ([address]) 2023    Recent acute cystitis without hematuria 2023    Seasonal allergies 2023    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 2023    Hypothyroid 2023    Urinary incontinence 2022    Urinary retention 2022    Osteoporosis 2022    Chronic constipation 2021    COPD (chronic obstructive pulmonary disease) ([address]) 2021    Depression, recurrent ([address]) 2021    Gastroparesis diabeticorum  2021    B12 neuropathy ([address]) 2021    Memory changes 10/22/2020    Essential hypertension 2020    Type 2 diabetes mellitus with diabetic chronic kidney disease ([facility]) 07/14/2020    Gastroesophageal reflux disease without esophagitis 07/14/2020    Fibromyalgia 05/27/2020    CKD (chronic kidney disease) stage 4, GFR 15-29 ml/min ([facility]) 05/08/2020    Hypertriglyceridemia 05/08/2020    Vitamin D deficiency 05/08/2020    Pulmonary hypertension ([facility]) 07/31/2019    Persistent proteinuria 06/25/2019    SHAYAN (obstructive sleep apnea) 04/09/2019    RLS (restless legs syndrome) 04/09/2019    Stage 4 chronic kidney disease ([facility]) 09/19/2018      LOS (days): 0  Geometric Mean LOS (GMLOS) (days):   Days to GMLOS:     OBJECTIVE:  PATIENT READMITTED TO HOSPITAL            Current admission status: Inpatient       Preferred Pharmacy:   [facility]/pharmacy [address] [place] [address]  Phone: [phone] Fax: [phone]    Primary Care Provider: Lauryn Hodge MD    Primary Insurance: MEDICARE  Secondary Insurance: COMMERCIAL MISCELLANEOUS    ASSESSMENT:  Carly Proxies       [facility] Representative - Spouse   Primary Phone: [phone] (Mobile)  Home Phone: [phone]                           Readmission Root Cause  30 Day Readmission: Yes  Who directed you to return to the hospital?: Family  Did you understand whom to contact if you had questions or problems?: Yes  Did you get your prescriptions before you left the hospital?: Yes  Were you able to get your prescriptions filled when you left the hospital?: Yes  Did you take your medications as prescribed?: Yes  Were you able to get to your follow-up appointments?: Yes  During previous admission, was a post-acute recommendation made?: No  Patient was readmitted due to: CVA/TIA , headache  Action Plan: stroke pathway in progress    Patient Information  Admitted from[de-identified] Home  Mental Status: Confused  During Assessment patient was accompanied by: Not accompanied during assessment  Assessment information provided by[de-identified] Spouse  Primary Caregiver: Spouse  Caregiver's Name[de-identified] Barry Benson (Spouse)  Caregiver's Relationship to Patient[de-identified] Significant Other  Caregiver's Telephone Number[de-identified] [phone]  Support Systems: Spouse/significant other  [place] of Residence: [facility] [address] [place] [address] do you live in?: [place] ([place])  Type of Current Residence: [address] home  Upon entering residence, is there a bedroom on the main floor (no further steps)?: Yes  Upon entering residence, is there a bathroom on the main floor (no further steps)?: Yes  In the last 12 months, was there a time when you were not able to pay the mortgage or rent on time?: No  In the last 12 months, how many places have you lived?: 1  In the last 12 months, was there a time when you did not have a steady place to sleep or slept in a shelter (including now)?: No  Living Arrangements: Lives w/ Spouse/significant other, Lives w/ Son    Activities of Daily Living Prior to Admission  Functional Status: Assistance  Completes ADLs independently?: No  Level of ADL dependence: Assistance  Ambulates independently?: Yes  Does patient use assisted devices?: Yes  Assisted Devices (DME) used: Straight Helane Pro, Wheelchair  Does patient currently own DME?: Yes  What DME does the patient currently own?: Osmin Chandler, Wheelchair  Does patient have a history of Outpatient Therapy (PT/OT)?: No  Does the patient have a history of Short-Term Rehab?: No  Does patient have a history of HHC?: No  Does patient currently have [address]?: No         Patient Information Continued  Income Source: Pension/half-way  Does patient have prescription coverage?: Yes  Within the past 12 months, you worried that your food would run out before you got the money to buy more.: Never true  Within the past 12 months, the food you bought just didn't last and you didn't have money to get more.: Never true  Does patient receive dialysis treatments?: No  Does patient have a history of substance abuse?: No  Does patient have a history of Mental Health Diagnosis?: No         Means of Transportation  Means of Transport to Appts[de-identified] Family transport  In the past 12 months, has lack of transportation kept you from medical appointments or from getting medications?: No  In the past 12 months, has lack of transportation kept you from meetings, work, or from getting things needed for daily living?: No

## 2023-11-07 NOTE — ED PROVIDER NOTES
History  Chief Complaint   Patient presents with    High Blood Pressure     Pt presents with complaints of HTN, states her BP this am was 170s/100s. Since yesterday reports feeling foggy, headache and generally weak. Weakness - Generalized     Patient is a 69-year-old female, with a history significant for COPD, diabetes, hypertension, obesity, fibromyalgia, CVA, who presents to the ED today due to a one day history hypertension with a systolic in the 847E and diastolic in the 685U. Patient has a home blood pressure monitor that has not been calibrated in an indeterminate amount of time and she has been following with nephrology/PCP for blood pressure management. Patient reports an associated 1 day of of generalized fatigue/weakness. She also describes gradual onset, atraumatic headache that is similar in character to prior headaches. Per my review of the medical record, patient was evaluated in the emergency department on 11/1 for headache and CT head at that time was unremarkable. Notably, yesterday prior to symptoms beginning, patient got a COVID booster shot. No clear remitting factors. Patient denies a temporal or positional component of the headache. Patient denies feeling emotionally down. Patient's , present in room, the history is provided that patient is at her baseline mental status. Patient is without other concerns at this time. Prior to Admission Medications   Prescriptions Last Dose Informant Patient Reported? Taking?    B-D ULTRAFINE III SHORT PEN 31G X 8 MM MISC  Self, Spouse/Significant Other Yes No   Calcium Citrate 1040 MG TABS  Self, Spouse/Significant Other Yes No   Sig: Take 500 mg by mouth Three times a day   Coenzyme Q10 (CoQ10) 100 MG CAPS  Self, Spouse/Significant Other Yes No   Sig: Take 100 mg by mouth in the morning   DULoxetine (CYMBALTA) 30 mg delayed release capsule  Self, Spouse/Significant Other No No   Sig: Take 1 capsule (30 mg total) by mouth daily DULoxetine (CYMBALTA) 30 mg delayed release capsule   No No   Sig: Take 1 capsule (30 mg total) by mouth daily Do not start before October 31, 2023. Icosapent Ethyl 1 g CAPS  Self, Spouse/Significant Other Yes No   Sig: Take 1 capsule by mouth 2 (two) times a day   Lactobacillus (PROBIOTIC ACIDOPHILUS PO)  Self, Spouse/Significant Other Yes No   Sig: Take 1 capsule by mouth 2 (two) times a day   Probiotic Product (PROBIOTIC PO)  Self, Spouse/Significant Other Yes No   Sig: Take 1 capsule by mouth 2 (two) times a day   acetaminophen (TYLENOL) 325 mg tablet  Self, Spouse/Significant Other No No   Sig: Take 3 tablets (975 mg total) by mouth every 8 (eight) hours   Patient not taking: Reported on 10/31/2023   albuterol (Ventolin HFA) 90 mcg/act inhaler  Self, Spouse/Significant Other No No   Sig: Inhale 2 puffs every 6 (six) hours as needed for wheezing   amLODIPine (NORVASC) 5 mg tablet  Self, Spouse/Significant Other No No   Sig: Take 1 tablet (5 mg total) by mouth daily   amLODIPine (NORVASC) 5 mg tablet   No No   Sig: Take 1 tablet (5 mg total) by mouth daily Do not start before October 31, 2023. aspirin (ECOTRIN LOW STRENGTH) 81 mg EC tablet   No No   Sig: Take 1 tablet (81 mg total) by mouth daily Do not start before October 31, 2023. cephalexin (KEFLEX) 250 mg capsule   No No   Sig: Take 1 capsule (250 mg total) by mouth daily at bedtime   cephalexin (KEFLEX) 250 mg capsule   No No   Sig: Take 1 capsule (250 mg total) by mouth daily at bedtime for 12 days   cholecalciferol (VITAMIN D3) 1,000 units tablet  Self, Spouse/Significant Other No No   Sig: Take 2 tablets (2,000 Units total) by mouth daily   cholecalciferol (VITAMIN D3) 1,000 units tablet   No No   Sig: Take 1 tablet (1,000 Units total) by mouth daily Do not start before October 31, 2023.    clopidogrel (PLAVIX) 75 mg tablet   Yes No   Sig: Take 75 mg by mouth daily Once daily for 21 days   clopidogrel (PLAVIX) 75 mg tablet   No No   Sig: Take 1 tablet (75 mg total) by mouth daily for 21 days   estradiol (ESTRACE VAGINAL) 0.1 mg/g vaginal cream  Self, Spouse/Significant Other No No   Sig: Apply pea sized amount around urethra and inside vaginal opening 3 times weekly   Patient not taking: Reported on 10/10/2023   famotidine (PEPCID) 10 mg tablet  Self, Spouse/Significant Other No No   Sig: Take 1 tablet (10 mg total) by mouth daily Do not start before September 13, 2023. ferrous sulfate 324 (65 Fe) mg  Self, Spouse/Significant Other No No   Sig: Take 1 tablet (324 mg total) by mouth every other day   ferrous sulfate 325 (65 Fe) mg tablet   No No   Sig: Take 1 tablet (325 mg total) by mouth daily with breakfast Do not start before October 31, 2023. gabapentin (NEURONTIN) 100 mg capsule   No No   Sig: Take 1 capsule (100 mg total) by mouth daily at bedtime   gabapentin (Neurontin) 100 mg capsule  Self, Spouse/Significant Other No No   Sig: Take 2 capsules (200 mg total) by mouth daily at bedtime   insulin aspart (NovoLOG) 100 units/mL injection  Self, Spouse/Significant Other Yes No   Sig: Inject under the skin 3 (three) times a day before meals 18 units, 18 units, 25 units. insulin detemir (LEVEMIR) 100 units/mL subcutaneous injection   No No   Sig: Inject 50 Units under the skin daily at bedtime   insulin detemir (Levemir FlexTouch) 100 Units/mL injection pen  Self, Spouse/Significant Other Yes No   Sig: Inject 50 Units under the skin every evening    insulin lispro (HumaLOG) 100 units/mL injection   No No   Sig: Take 18 units with breakfast and lunch and 25 units with dinner   levothyroxine (Synthroid) 125 mcg tablet  Self, Spouse/Significant Other No No   Sig: Take 1 tablet (125 mcg total) by mouth daily   levothyroxine 125 mcg tablet   No No   Sig: Take 1 tablet (125 mcg total) by mouth daily in the early morning Do not start before October 31, 2023.    losartan (COZAAR) 25 mg tablet   No No   Sig: Take 1 tablet (25 mg total) by mouth 2 (two) times a day   losartan (COZAAR) 25 mg tablet   No No   Sig: Take 1 tablet (25 mg total) by mouth daily Do not start before October 31, 2023.    metoprolol tartrate (LOPRESSOR) 50 mg tablet  Self, Spouse/Significant Other No No   Sig: Take 1 tablet (50 mg total) by mouth every 12 (twelve) hours   metoprolol tartrate (LOPRESSOR) 50 mg tablet   No No   Sig: Take 1 tablet (50 mg total) by mouth every 12 (twelve) hours   pantoprazole (PROTONIX) 40 mg tablet  Self, Spouse/Significant Other No No   Sig: Take 1 tablet (40 mg total) by mouth 2 (two) times a day   pantoprazole (PROTONIX) 40 mg tablet   No No   Sig: Take 1 tablet (40 mg total) by mouth 2 (two) times a day before meals   pramipexole (MIRAPEX) 0.25 mg tablet  Self, Spouse/Significant Other No No   Sig: Take 1 tablet (0.25 mg total) by mouth daily at bedtime   pramipexole (MIRAPEX) 0.25 mg tablet   No No   Sig: Take 1 tablet (0.25 mg total) by mouth daily at bedtime   pravastatin (PRAVACHOL) 40 mg tablet   No No   Sig: Take 1 tablet (40 mg total) by mouth daily with dinner   rosuvastatin (CRESTOR) 5 mg tablet  Self, Spouse/Significant Other No No   Sig: Take 1 tablet (5 mg total) by mouth daily   torsemide (DEMADEX) 10 mg tablet  Self, Spouse/Significant Other No No   Sig: Take 0.5 tablets (5 mg total) by mouth every other day Take this medication only if your weight exceeds 172 pounds      Facility-Administered Medications Last Administration Doses Remaining   cyanocobalamin injection 1,000 mcg 10/31/2023 10:53 AM    cyanocobalamin injection 1,000 mcg None recorded           Past Medical History:   Diagnosis Date    Actinic keratosis     Acute cystitis without hematuria 04/28/2023    Acute cystitis without hematuria 04/28/2023    Acute-on-chronic kidney injury      NIKOS (acute kidney injury) ([address]) 05/08/2020    Allergic     Allergic rhinitis     Ambulatory dysfunction 10/22/2020    AMS (altered mental status) 07/14/2020    Anesthesia     pt reports "had to use double lumen endo tube for hiatal hernia repair/so surgeon could get to where he needed to work"    Arthritis     Aspiration into airway     Michael esophagus 1993    Lot of stress with children    Basal cell carcinoma 2007    left cheek     BCC (basal cell carcinoma) 05/27/2021    Left Nasal tip    Breast discharge 06/19/2023    Breast pain 06/19/2023    Cancer ([address])     squamous cell cancer on forhead    Cancer ([address])     basal cell on nose     Cholelithiasis     Chronic kidney disease 2000, 2018    Stones, kidney disease stage 4    Chronic pain disorder     bilat feet and joint pain on occas    Colon polyp     Constipation     COPD (chronic obstructive pulmonary disease) ([address])     COVID-19 08/2021    recovered at home/did receive monoclonal infusion    COVID-19 virus infection 08/16/2021    Dental bridge present     Depression     Diabetes mellitus ([address])     Type 2    Diabetic neuropathy ([address])     Difficulty walking 2017    Disease of thyroid gland     Dyspnea     Elevated liver enzymes 04/04/2023    Epigastric abdominal pain with severe diabetic gastroparesis, status post EGD/Botox injection, 5/14 05/17/2021    Facial abrasion, initial encounter 03/22/2023    Fall 03/22/2023    Family history of thyroid problem     Fatty liver     Fibromyalgia, primary     Fracture of orbital floor, blow-out, right, closed, with routine healing, subsequent encounter 03/22/2023    GERD (gastroesophageal reflux disease)     Heart burn     Hiatal hernia     History of colon polyps 07/30/2021    History of kidney stones 09/29/2020    Hx of recurrent kidney stones    History of kidney stones 09/29/2020    Hx of recurrent kidney stones  Formatting of this note might be different from the original. Hx of recurrent kidney stones  Last Assessment & Plan:  Formatting of this note might be different from the original. We will set her up for follow-up in 3 months with a KUB prior.   She knows to call if she has any kidney stone type pain in the meantime.     History of pneumonia     History of repair of hiatal hernia 05/03/2020    Hyperlipidemia     Hyperplasia, parathyroid ([address])     Hypertension     Hyponatremia 04/26/2023    Hypotension 04/13/2022    Kidney problem     Kidney stone     Leukocytosis 07/14/2020    Memory loss Julu2 2020    Motion sickness     Motion sickness     Multiple closed fractures of ribs of right side 03/22/2023    Nasal bones, closed fracture 03/22/2023    Neck pain     Obesity 1978    Obesity (BMI 30.0-34.9)     Other chest pain 09/13/2021    Peripheral neuropathy     Pollen allergies     RLS (restless legs syndrome)     S/P foot surgery 07/20/2022    SCC (squamous cell carcinoma) 05/04/2021    left mid forehead    SCC (squamous cell carcinoma) 2023    chest    Seasonal allergies     Shingles 01 05 23    Sleep apnea     has inspire    Squamous cell skin cancer 2007    left cheek     Swollen ankles     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, left     great toe fusion    Urinary incontinence     Urinary tract infection 03/28/2022    Wears glasses        Past Surgical History:   Procedure Laterality Date    ABDOMINAL SURGERY  [id number]    Nissen x2 1972 tubal ligarion    ARTHROSCOPY KNEE      BREAST BIOPSY      stereotactic-benign    BREAST BIOPSY      stereo-benign    BREAST CYST EXCISION Bilateral     benign    BREAST EXCISIONAL BIOPSY      unknown date-benign    BREAST EXCISIONAL BIOPSY      unknown date-benign    BREAST EXCISIONAL BIOPSY      unknown date-benign    BREAST EXCISIONAL BIOPSY      unknown age-benign    CARDIAC CATHETERIZATION      CHOLECYSTECTOMY      COLONOSCOPY      EXAMINATION UNDER ANESTHESIA N/A 06/24/2021    Procedure: EXAM UNDER ANESTHESIA (EUA), DISE;  Surgeon: Wanda Bermudez MD;  Location: [facility];  Service: ENT    HERNIA REPAIR      HIATAL HERNIA REPAIR      KNEE SURGERY      Kali powers lap surg    LIPOSUCTION      LYMPH NODE BIOPSY      MOHS SURGERY  05/20/2021    left mid forehead-Morgan MOHS SURGERY Left 05/27/2021    Left nasal tip- mickey     ND LIGATION/BIOPSY TEMPORAL ARTERY Left 01/05/2023    Procedure: BIOPSY ARTERY TEMPORAL;  Surgeon: Robert Hawkins DO;  Location: [facility];  Service: Vascular    ND OPEN IMPLANTATION CRANIAL NERVE BOOM & PULSE GEN N/A 11/10/2021    Procedure: INSERTION UPPER AIRWAY STIMULATOR, INSPIRE IMPLANT;  Surgeon: Naresh Parrish MD;  Location: [facility];  Service: ENT    ND UNLISTED PROCEDURE FOOT/TOES Right 07/19/2022    Procedure: 1st metatarsal phalangeal joint fusion;  Surgeon: Tee Hodge DPM;  Location: [facility];  Service: Podiatry    REDUCTION MAMMAPLASTY Bilateral 2000 [address] [facility]      SKIN BIOPSY      SKIN CANCER EXCISION  2007    squamous cell carcinoma     SKIN CANCER EXCISION  2007    basal cell carcinoma    SKIN CANCER EXCISION  2023    SCCIS chest    SQUAMOUS CELL CARCINOMA EXCISION      TOE SURGERY Right 08/04/2022    Right Great Toe Fusion    TONSILLECTOMY      TUBAL LIGATION      UPPER GASTROINTESTINAL ENDOSCOPY      US GUIDED BREAST BIOPSY RIGHT COMPLETE Right 07/18/2022       Family History   Problem Relation Age of Onset    Heart disease Mother     Depression Mother     Hypertension Mother     COPD Mother     Hearing loss Mother     Anxiety disorder Mother     Heart disease Father     Lung cancer Father 79        Smoker     Cancer Father         brain    Alcohol abuse Father     Dementia Father     Hypertension Father     Thyroid disease Father     COPD Father     Arthritis Father     Brain cancer Father 76    Hypertension Sister     Diabetes Sister     Heart disease Sister     Thyroid disease Sister     Cancer Sister         Lympoma, lung    Lung cancer Sister 78    Anxiety disorder Sister     Migraines Sister     Hypertension Brother     Diabetes Brother     Cancer Brother         Throat    Dementia Brother     Stroke Brother     Hypertension Brother     Heart disease Brother     Diabetes Brother     Hypertension Brother No Known Problems Son     No Known Problems Son     Brain cancer Paternal Aunt         unknown age    Diabetes Sister     Hypertension Sister     Diabetes Brother     Breast cancer Neg Hx      I have reviewed and agree with the history as documented. E-Cigarette/Vaping    E-Cigarette Use Never User      E-Cigarette/Vaping Substances    Nicotine No     THC No     CBD No     Flavoring No     Other No     Unknown No      Social History     Tobacco Use    Smoking status: Former     Packs/day: 2.00     Years: 10.00     Total pack years: 20.00     Types: Cigarettes     Start date: 1966     Quit date: 1976     Years since quittin.8     Passive exposure: Past    Smokeless tobacco: Never    Tobacco comments:     Smoked 2 pack a day   Vaping Use    Vaping Use: Never used   Substance Use Topics    Alcohol use: Yes     Comment: 1 or 2 a year    Drug use: No       Review of Systems   Constitutional:  Positive for fatigue. Negative for fever. HENT:  Negative for trouble swallowing. Eyes:  Negative for visual disturbance. Respiratory:  Negative for shortness of breath. Cardiovascular:  Negative for chest pain. Gastrointestinal:  Negative for abdominal pain. Endocrine: Negative for polyuria. Genitourinary:  Negative for dysuria. Musculoskeletal:  Negative for back pain. Skin:  Negative for rash. Allergic/Immunologic: Positive for environmental allergies. Neurological:  Positive for headaches. Negative for weakness and numbness. Hematological:  Negative for adenopathy. Psychiatric/Behavioral:  Negative for confusion. All other systems reviewed and are negative. Physical Exam  Physical Exam  Vitals and nursing note reviewed. Constitutional:       General: She is not in acute distress. Appearance: She is not ill-appearing, toxic-appearing or diaphoretic. Comments: Patient appears comfortable during my evaluation    HENT:      Head: Normocephalic and atraumatic. Comments: No pain with palpation of the temples     Right Ear: External ear normal.      Left Ear: External ear normal.      Nose: Nose normal. No rhinorrhea. Mouth/Throat:      Mouth: Mucous membranes are moist.      Pharynx: Oropharynx is clear. No oropharyngeal exudate or posterior oropharyngeal erythema. Comments: Uvula midline. No oropharyngeal or submandibular mass/swelling  Eyes:      General: No scleral icterus. Right eye: No discharge. Left eye: No discharge. Extraocular Movements: Extraocular movements intact. Conjunctiva/sclera: Conjunctivae normal.      Pupils: Pupils are equal, round, and reactive to light. Neck:      Comments: Patient is spontaneously rotating their neck to the left and right during the history and physical exam interaction without difficulty or apparent discomfort    Cardiovascular:      Rate and Rhythm: Normal rate and regular rhythm. Pulses: Normal pulses. Heart sounds: Normal heart sounds. No murmur heard. No friction rub. No gallop. Comments: 2+ Radial  Pulmonary:      Effort: Pulmonary effort is normal. No respiratory distress. Breath sounds: Normal breath sounds. No stridor. No wheezing, rhonchi or rales. Abdominal:      General: Abdomen is flat. There is no distension. Palpations: Abdomen is soft. Tenderness: There is no abdominal tenderness. There is no right CVA tenderness, left CVA tenderness, guarding or rebound. Musculoskeletal:         General: No tenderness or deformity. Cervical back: Neck supple. No tenderness. No muscular tenderness. Lymphadenopathy:      Cervical: No cervical adenopathy. Skin:     General: Skin is warm and dry. Capillary Refill: Capillary refill takes less than 2 seconds. Neurological:      Mental Status: She is alert. Comments: Cranial nerves 2-12 intact. 5/5 strength in all four extremities including finger extension against resistance.   Sensation to light touch subjectively intact/equal in all four extremities and the face. Patient able to ambulate without difficulty. Patient is speaking clearly in complete sentences. Patient is answering appropriately and able to follow commands. Psychiatric:         Mood and Affect: Mood normal.         Behavior: Behavior normal.         Vital Signs  ED Triage Vitals   Temperature Pulse Respirations Blood Pressure SpO2   11/07/23 0747 11/07/23 0745 11/07/23 0745 11/07/23 0745 11/07/23 0745   98.2 °F (36.8 °C) 74 18 (!) 176/84 94 %      Temp Source Heart Rate Source Patient Position - Orthostatic VS BP Location FiO2 (%)   11/07/23 0747 11/07/23 0745 11/07/23 0745 11/07/23 0745 --   Oral Monitor Sitting Right arm       Pain Score       11/07/23 0745       10 - Worst Possible Pain           Vitals:    11/07/23 0930 11/07/23 1000 11/07/23 1130 11/07/23 1249   BP: 165/88 (!) 177/82 (!) 182/82 (!) 179/90   Pulse: 75 73 73 71   Patient Position - Orthostatic VS: Sitting Sitting  Lying         Visual Acuity      ED Medications  Medications   metoclopramide (REGLAN) injection 5 mg (5 mg Intravenous Not Given 11/7/23 0823)       Diagnostic Studies  Results Reviewed       Procedure Component Value Units Date/Time    Urine Microscopic [[id number]]  (Abnormal) Collected: 11/07/23 1203    Lab Status: Final result Specimen: Urine, Other Updated: 11/07/23 1223     RBC, UA 1-2 /hpf      WBC, UA 10-20 /hpf      Epithelial Cells Occasional /hpf      Bacteria, UA None Seen /hpf     Urine culture [[id number]] Collected: 11/07/23 1203    Lab Status:  In process Specimen: Urine, Other Updated: 11/07/23 1223    UA w Reflex to Microscopic w Reflex to Culture [[id number]]  (Abnormal) Collected: 11/07/23 1203    Lab Status: Final result Specimen: Urine, Other Updated: 11/07/23 1220     Color, UA Light Yellow     Clarity, UA Clear     Specific Gravity, UA 1.020     pH, UA 6.5     Leukocytes, UA Trace     Nitrite, UA Negative     Protein,  (3+) mg/dl      Glucose,  (1/10%) mg/dl      Ketones, UA Negative mg/dl      Urobilinogen, UA <2.0 mg/dl      Bilirubin, UA Negative     Occult Blood, UA Small    HS Troponin I 2hr [[id number]]  (Normal) Collected: 11/07/23 1035    Lab Status: Final result Specimen: Blood from Arm, Left Updated: 11/07/23 1109     hs TnI 2hr 6 ng/L      Delta 2hr hsTnI -3 ng/L     HS Troponin 0hr (reflex protocol) [[id number]]  (Normal) Collected: 11/07/23 0809    Lab Status: Final result Specimen: Blood from Arm, Left Updated: 11/07/23 0846     hs TnI 0hr 9 ng/L     Comprehensive metabolic panel [[id number]]  (Abnormal) Collected: 11/07/23 0809    Lab Status: Final result Specimen: Blood from Arm, Left Updated: 11/07/23 0839     Sodium 137 mmol/L      Potassium 3.6 mmol/L      Chloride 103 mmol/L      CO2 28 mmol/L      ANION GAP 6 mmol/L      BUN 29 mg/dL      Creatinine 2.37 mg/dL      Glucose 121 mg/dL      Calcium 8.8 mg/dL      Corrected Calcium 9.3 mg/dL      AST 20 U/L      ALT 27 U/L      Alkaline Phosphatase 55 U/L      Total Protein 6.1 g/dL      Albumin 3.4 g/dL      Total Bilirubin 0.39 mg/dL      eGFR 19 ml/min/1.73sq m     Narrative:      [facility] guidelines for Chronic Kidney Disease (CKD):     Stage 1 with normal or high GFR (GFR > 90 mL/min/1.73 square meters)    Stage 2 Mild CKD (GFR = 60-89 mL/min/1.73 square meters)    Stage 3A Moderate CKD (GFR = 45-59 mL/min/1.73 square meters)    Stage 3B Moderate CKD (GFR = 30-44 mL/min/1.73 square meters)    Stage 4 Severe CKD (GFR = 15-29 mL/min/1.73 square meters)    Stage 5 End Stage CKD (GFR <15 mL/min/1.73 square meters)  Note: GFR calculation is accurate only with a steady state creatinine    CBC and differential [[id number]]  (Abnormal) Collected: 11/07/23 0809    Lab Status: Final result Specimen: Blood from Arm, Left Updated: 11/07/23 0829     WBC 12.07 Thousand/uL      RBC 4.02 Million/uL      Hemoglobin 12.0 g/dL      Hematocrit 36.1 % MCV 90 fL      MCH 29.9 pg      MCHC 33.2 g/dL      RDW 15.6 %      MPV 11.5 fL      Platelets 677 Thousands/uL      nRBC 0 /100 WBCs      Neutrophils Relative 74 %      Immat GRANS % 1 %      Lymphocytes Relative 17 %      Monocytes Relative 6 %      Eosinophils Relative 2 %      Basophils Relative 0 %      Neutrophils Absolute 9.02 Thousands/µL      Immature Grans Absolute 0.06 Thousand/uL      Lymphocytes Absolute 2.02 Thousands/µL      Monocytes Absolute 0.75 Thousand/µL      Eosinophils Absolute 0.19 Thousand/µL      Basophils Absolute 0.03 Thousands/µL                    XR chest 2 views   ED Interpretation by Catrachita Denson MD (11/07 1942)   Per my independent interpretation: No acute cardiopulmonary process                 Procedures  Procedures         ED Course  ED Course as of 11/07/23 1300   Tue Nov 07, 2023   0747 ECG per my independent interpretation: Normal rate, regular rhythm, no ectopy, normal axis, no ST elevations or depressions     0849 hs TnI 0hr: 9  WNL   0849 Creatinine(!): 2.37  High, Similar to prior levels   1117 Delta 2hr hsTnI: -3  WNL   1224 Bacteria, UA: None Seen  WNL   1245 Lab work reassuring overall; however, on reevaluation, patient's  states that she is very confused. He states that she has been admitted for this before but is agreeable to admission at this time as she currently thinks that the bed is a toilet and that the toilet is not a toilet. He does not feel comfortable taking her home. Patient's orientation is declining when compared to my initial evaluation. Given patient's initial normal mental status and no significant change in her level of hypertension/recurrence of symptoms over a period of 1.5 months, low suspicion for hypertensive encephalopathy/hypertensive emergency at this time. Patient accepted for admission                               SBIRT 22yo+      Flowsheet Row Most Recent Value   Initial Alcohol Screen: US AUDIT-C     1.  How often do you have a drink containing alcohol? 0 Filed at: 11/07/2023 0807   2. How many drinks containing alcohol do you have on a typical day you are drinking? 0 Filed at: 11/07/2023 0807   3a. Male UNDER 65: How often do you have five or more drinks on one occasion? 0 Filed at: 11/07/2023 0807   3b. FEMALE Any Age, or MALE 65+: How often do you have 4 or more drinks on one occassion? 0 Filed at: 11/07/2023 0807   Audit-C Score 0 Filed at: 11/07/2023 5460   VALE: How many times in the past year have you. .. Used an illegal drug or used a prescription medication for non-medical reasons? Never Filed at: 11/07/2023 9602                      Medical Decision Making  Patient is a 78-year-old female, with a history significant for COPD, diabetes, hypertension, obesity, fibromyalgia, CVA, who presents to the ED today due to a one day history hypertension with a systolic in the 902Z and diastolic in the 035D. Patient has a home blood pressure monitor that has not been calibrated in an indeterminate amount of time and she has been following with nephrology/PCP for blood pressure management. Patient reports an associated 1 day of of generalized fatigue/weakness. She also describes gradual onset, atraumatic headache that is similar in character to prior headaches. Per my review of the medical record, patient was evaluated in the emergency department on 11/1 for headache and CT head at that time was unremarkable. Notably, yesterday prior to symptoms beginning, patient got a COVID booster shot. No clear remitting factors. Patient denies a temporal or positional component of the headache. Patient denies feeling emotionally down. Patient's , present in room, the history is provided that patient is at her baseline mental status. Patient is currently afebrile and hemodynamically stable. Her physical exam is unremarkable: Heart lungs clear to auscultation, abdomen soft nontender, no focal neurologic deficit.   This presentation is concerning for: Primary headache, vaccine reaction. I also considered acute viral syndrome, ACS, electrolyte abnormality. Patient at risk for pneumonia and UTI. No reason to suspect temporal arteritis, SAH, meningitis based on history physical exam.  Will investigate with cardiac work-up, UA. Will manage with Reglan and further based upon work-up    Amount and/or Complexity of Data Reviewed  Labs: ordered. Decision-making details documented in ED Course. Radiology: ordered and independent interpretation performed. Risk  Prescription drug management. Decision regarding hospitalization. Disposition  Final diagnoses:   Elevated blood pressure reading   Acute headache   Generalized weakness   Hematuria   Proteinuria   Acute encephalopathy     Time reflects when diagnosis was documented in both MDM as applicable and the Disposition within this note       Time User Action Codes Description Comment    11/7/2023 11:17 AM Sundar Whitfield A Add [R03.0] Elevated blood pressure reading     11/7/2023 11:17 AM Sundar Jiménezbert A Add [R51.9] Acute headache     11/7/2023 11:17 AM Jean Eves A Add [R53.1] Generalized weakness     11/7/2023 11:17 AM Jean Eves A Modify [R03.0] Elevated blood pressure reading     11/7/2023 11:17 AM Jean Eves A Modify [R51.9] Acute headache     11/7/2023 12:41 PM Jean Eves A Add [R31.9] Hematuria     11/7/2023 12:42 PM Sundar Jiménez A Add [R80.9] Proteinuria     11/7/2023 12:47 PM Jean Eves A Add [G93.40] Acute encephalopathy           ED Disposition       ED Disposition   Admit    Condition   Stable    Date/Time   Tue Nov 7, 2023 1259    Comment   Case was discussed with KRISTINA and the patient's admission status was agreed to be Admission Status: inpatient status to the service of Dr. Latia Zhao .                Follow-up Information       Follow up With Specialties Details Why Contact Info Additional Information    Ethyl MD [facility] Schedule an appointment as soon as possible for a visit   [address] [facility] Schedule an appointment as soon as possible for a visit   [address] [id number] [address] [facility] [place], [address] [place], [place], [address]            Patient's Medications   Discharge Prescriptions    No medications on file           PDMP Review         Value Time User    PDMP Reviewed  Yes 3/24/2023  9:59 PM Barrett Stearns MD            ED Provider  Electronically Signed by             Karin Holley MD  11/07/23 1300

## 2023-11-07 NOTE — ASSESSMENT & PLAN NOTE
Hypertensive urgency   patient presents with complaints of elevated blood pressure and headache, did not receive any medication in the emergency department. I have seen and examined the patient at the bedside around 1:45 PM, she still complains of headache frontal.  Per ED physician patient felt to be somewhat confused while she was in the emergency department-and hence requested for admission. Patient recently had stroke, discharged on 10/30/2023. Will order IV hydralazine 5 mg, reorder oral medications. Will obtain stat CT head. Have some leukocytosis, UA negative for any infection.   Per patient  for confusion has been going on for more than a month

## 2023-11-07 NOTE — ED NOTES
Pt attempted to urinate using bedside commode. Unsuccessful at this time.       Dwayne Martinez RN  11/07/23 0617

## 2023-11-07 NOTE — DISCHARGE INSTRUCTIONS
You were evaluated in the emergency department for: High blood pressure. You can access your current and pending results through [facility]. A radiologist will take a second look at your X-Rays, if you had any, and you will be contacted with any new findings. You should follow-up with your primary care provider and your nephrologist, as soon as possible, for re-evaluation. If you do not have a primary care provider, I have referred you to [facility]. You will be contacted about scheduling an appointment. Their phone number is also included on this paperwork. Your workup revealed no emergent features at this time; however, many disease processes are dynamic:    Please, return to the emergency department if you experience new or worsening symptoms, fever, chest pain, shortness of breath, difficulty breathing, dizziness, abdominal pain, persistent nausea/vomiting, syncope or passing out, blood in your urine or stool, coughing up blood, leg swelling/pain, urinary retention, bowel or bladder incontinence, numbness between your legs. You have also been referred to urology and you should follow-up with them as well. Additionally, your blood pressure was measured to be high. This is something that you should discuss with your primary care provider and have re-checked within one week.

## 2023-11-08 ENCOUNTER — APPOINTMENT (INPATIENT)
Dept: MRI IMAGING | Facility: HOSPITAL | Age: 73
DRG: 304 | End: 2023-11-08
Payer: MEDICARE

## 2023-11-08 ENCOUNTER — APPOINTMENT (OUTPATIENT)
Facility: CLINIC | Age: 73
End: 2023-11-08
Payer: MEDICARE

## 2023-11-08 PROBLEM — R41.89 COGNITIVE IMPAIRMENT: Status: ACTIVE | Noted: 2023-11-08

## 2023-11-08 PROBLEM — I16.0 HYPERTENSIVE URGENCY: Status: ACTIVE | Noted: 2023-10-27

## 2023-11-08 PROBLEM — N30.00 ACUTE CYSTITIS WITHOUT HEMATURIA: Status: RESOLVED | Noted: 2023-04-28 | Resolved: 2023-11-08

## 2023-11-08 LAB
BACTERIA UR CULT: NORMAL
GLUCOSE SERPL-MCNC: 155 MG/DL (ref 65–140)
GLUCOSE SERPL-MCNC: 188 MG/DL (ref 65–140)
GLUCOSE SERPL-MCNC: 214 MG/DL (ref 65–140)

## 2023-11-08 PROCEDURE — 99232 SBSQ HOSP IP/OBS MODERATE 35: CPT | Performed by: PHYSICIAN ASSISTANT

## 2023-11-08 PROCEDURE — 82948 REAGENT STRIP/BLOOD GLUCOSE: CPT

## 2023-11-08 PROCEDURE — 70551 MRI BRAIN STEM W/O DYE: CPT

## 2023-11-08 PROCEDURE — 99232 SBSQ HOSP IP/OBS MODERATE 35: CPT | Performed by: STUDENT IN AN ORGANIZED HEALTH CARE EDUCATION/TRAINING PROGRAM

## 2023-11-08 RX ORDER — AMLODIPINE BESYLATE 5 MG/1
5 TABLET ORAL DAILY
Status: DISCONTINUED | OUTPATIENT
Start: 2023-11-08 | End: 2023-11-09 | Stop reason: HOSPADM

## 2023-11-08 RX ORDER — INSULIN LISPRO 100 [IU]/ML
5 INJECTION, SOLUTION INTRAVENOUS; SUBCUTANEOUS
Status: DISCONTINUED | OUTPATIENT
Start: 2023-11-08 | End: 2023-11-09 | Stop reason: HOSPADM

## 2023-11-08 RX ORDER — LOSARTAN POTASSIUM 25 MG/1
25 TABLET ORAL 2 TIMES DAILY
Status: DISCONTINUED | OUTPATIENT
Start: 2023-11-08 | End: 2023-11-09 | Stop reason: HOSPADM

## 2023-11-08 RX ORDER — HEPARIN SODIUM 5000 [USP'U]/ML
5000 INJECTION, SOLUTION INTRAVENOUS; SUBCUTANEOUS EVERY 8 HOURS SCHEDULED
Status: DISCONTINUED | OUTPATIENT
Start: 2023-11-08 | End: 2023-11-09 | Stop reason: HOSPADM

## 2023-11-08 RX ORDER — INSULIN LISPRO 100 [IU]/ML
1-5 INJECTION, SOLUTION INTRAVENOUS; SUBCUTANEOUS
Status: DISCONTINUED | OUTPATIENT
Start: 2023-11-08 | End: 2023-11-09 | Stop reason: HOSPADM

## 2023-11-08 RX ADMIN — METOPROLOL TARTRATE 50 MG: 50 TABLET, FILM COATED ORAL at 21:52

## 2023-11-08 RX ADMIN — LOSARTAN POTASSIUM 25 MG: 25 TABLET, FILM COATED ORAL at 12:16

## 2023-11-08 RX ADMIN — INSULIN LISPRO 5 UNITS: 100 INJECTION, SOLUTION INTRAVENOUS; SUBCUTANEOUS at 09:50

## 2023-11-08 RX ADMIN — INSULIN LISPRO 1 UNITS: 100 INJECTION, SOLUTION INTRAVENOUS; SUBCUTANEOUS at 16:55

## 2023-11-08 RX ADMIN — DULOXETINE HYDROCHLORIDE 30 MG: 30 CAPSULE, DELAYED RELEASE ORAL at 08:22

## 2023-11-08 RX ADMIN — LOSARTAN POTASSIUM 25 MG: 25 TABLET, FILM COATED ORAL at 21:52

## 2023-11-08 RX ADMIN — INSULIN LISPRO 2 UNITS: 100 INJECTION, SOLUTION INTRAVENOUS; SUBCUTANEOUS at 12:16

## 2023-11-08 RX ADMIN — INSULIN LISPRO 5 UNITS: 100 INJECTION, SOLUTION INTRAVENOUS; SUBCUTANEOUS at 16:55

## 2023-11-08 RX ADMIN — AMLODIPINE BESYLATE 5 MG: 5 TABLET ORAL at 12:16

## 2023-11-08 RX ADMIN — GABAPENTIN 100 MG: 100 CAPSULE ORAL at 21:52

## 2023-11-08 RX ADMIN — PANTOPRAZOLE SODIUM 40 MG: 40 TABLET, DELAYED RELEASE ORAL at 06:11

## 2023-11-08 RX ADMIN — FAMOTIDINE 10 MG: 20 TABLET ORAL at 08:22

## 2023-11-08 RX ADMIN — CLOPIDOGREL BISULFATE 75 MG: 75 TABLET ORAL at 08:22

## 2023-11-08 RX ADMIN — INSULIN DETEMIR 30 UNITS: 100 INJECTION, SOLUTION SUBCUTANEOUS at 21:52

## 2023-11-08 RX ADMIN — HEPARIN SODIUM 5000 UNITS: 5000 INJECTION INTRAVENOUS; SUBCUTANEOUS at 12:40

## 2023-11-08 RX ADMIN — METOPROLOL TARTRATE 50 MG: 50 TABLET, FILM COATED ORAL at 08:22

## 2023-11-08 RX ADMIN — PRAVASTATIN SODIUM 40 MG: 40 TABLET ORAL at 16:54

## 2023-11-08 RX ADMIN — CEPHALEXIN 250 MG: 250 CAPSULE ORAL at 21:52

## 2023-11-08 RX ADMIN — PANTOPRAZOLE SODIUM 40 MG: 40 TABLET, DELAYED RELEASE ORAL at 16:54

## 2023-11-08 RX ADMIN — LEVOTHYROXINE SODIUM 125 MCG: 125 TABLET ORAL at 06:11

## 2023-11-08 RX ADMIN — FERROUS SULFATE TAB 325 MG (65 MG ELEMENTAL FE) 325 MG: 325 (65 FE) TAB at 08:22

## 2023-11-08 RX ADMIN — HEPARIN SODIUM 5000 UNITS: 5000 INJECTION INTRAVENOUS; SUBCUTANEOUS at 21:53

## 2023-11-08 RX ADMIN — INSULIN LISPRO 5 UNITS: 100 INJECTION, SOLUTION INTRAVENOUS; SUBCUTANEOUS at 12:17

## 2023-11-08 RX ADMIN — PRAMIPEXOLE DIHYDROCHLORIDE 0.25 MG: 0.25 TABLET ORAL at 21:52

## 2023-11-08 RX ADMIN — ASPIRIN 81 MG: 81 TABLET, COATED ORAL at 08:22

## 2023-11-08 NOTE — ASSESSMENT & PLAN NOTE
Patient presents with complaints of elevated blood pressure and headache, did not receive any medication in the emergency department. Per ED physician patient felt to be somewhat confused while she was in the emergency department-and hence requested for admission. Patient recently had stroke, discharged on 10/30/2023. CT head negative on admission   Neurology input appreciated with recent CVA. Allowed permissive HTN on admission though repeat MRI brain negative, will resume BP meds and monitor   Only takes torsemide PRN for weight gain   Continue metoprolol.   PRN IV hydralazine ordered on admission

## 2023-11-08 NOTE — PLAN OF CARE
Problem: Potential for Falls  Goal: Patient will remain free of falls  Description: INTERVENTIONS:  - Educate patient/family on patient safety including physical limitations  - Instruct patient to call for assistance with activity   - Consult OT/PT to assist with strengthening/mobility   - Keep Call bell within reach  - Keep bed low and locked with side rails adjusted as appropriate  - Keep care items and personal belongings within reach  - Initiate and maintain comfort rounds  - Make Fall Risk Sign visible to staff  - Offer Toileting every  Hours, in advance of need  - Initiate/Maintain alarm  - Obtain necessary fall risk management equipmen  - Apply yellow socks and bracelet for high fall risk patients  - Consider moving patient to room near nurses station  Outcome: Adequate for Discharge     Problem: NEUROSENSORY - ADULT  Goal: Achieves stable or improved neurological status  Description: INTERVENTIONS  - Monitor and report changes in neurological status  - Monitor vital signs such as temperature, blood pressure, glucose, and any other labs ordered   - Initiate measures to prevent increased intracranial pressure  - Monitor for seizure activity and implement precautions if appropriate      Outcome: Adequate for Discharge  Goal: Remains free of injury related to seizures activity  Description: INTERVENTIONS  - Maintain airway, patient safety  and administer oxygen as ordered  - Monitor patient for seizure activity, document and report duration and description of seizure to physician/advanced practitioner  - If seizure occurs,  ensure patient safety during seizure  - Reorient patient post seizure  - Seizure pads on all 4 side rails  - Instruct patient/family to notify RN of any seizure activity including if an aura is experienced  - Instruct patient/family to call for assistance with activity based on nursing assessment  - Administer anti-seizure medications if ordered    Outcome: Adequate for Discharge  Goal: Achieves maximal functionality and self care  Description: INTERVENTIONS  - Monitor swallowing and airway patency with patient fatigue and changes in neurological status  - Encourage and assist patient to increase activity and self care.    - Encourage visually impaired, hearing impaired and aphasic patients to use assistive/communication devices  Outcome: Adequate for Discharge     Problem: CARDIOVASCULAR - ADULT  Goal: Maintains optimal cardiac output and hemodynamic stability  Description: INTERVENTIONS:  - Monitor I/O, vital signs and rhythm  - Monitor for S/S and trends of decreased cardiac output  - Administer and titrate ordered vasoactive medications to optimize hemodynamic stability  - Assess quality of pulses, skin color and temperature  - Assess for signs of decreased coronary artery perfusion  - Instruct patient to report change in severity of symptoms  Outcome: Adequate for Discharge  Goal: Absence of cardiac dysrhythmias or at baseline rhythm  Description: INTERVENTIONS:  - Continuous cardiac monitoring, vital signs, obtain 12 lead EKG if ordered  - Administer antiarrhythmic and heart rate control medications as ordered  - Monitor electrolytes and administer replacement therapy as ordered  Outcome: Adequate for Discharge

## 2023-11-08 NOTE — ASSESSMENT & PLAN NOTE
POA with elevated BP. Recent stroke  Neurology consult appreciated, ordered magnesium and tylenol (pt refused tylenol)   Pt denies HA to me today. Though per d/w neurology and  her answers are inconsistent and she told neuro this AM she had LIU. She appears comfortable.

## 2023-11-08 NOTE — ASSESSMENT & PLAN NOTE
Lab Results   Component Value Date    HGBA1C 7.1 (H) 10/28/2023       No results for input(s): "POCGLU" in the last 72 hours. Blood Sugar Average: Last 72 hrs:  Home regimen: Levemir 50 units QHS, novolog 18/18/25   She was not ordered any insulin on admission. Will order levemir 30 units QHS, humalog 5 units TID, SSI, and accu checks. Monitor PRN.

## 2023-11-08 NOTE — PROGRESS NOTES
Progress Note - Neurology   Jason Wolf 75 y.o. female [id number]  Unit/Bed#: S /S -01    Assessment:    Headache  Assessment & Plan  76-year-old female with prior stroke on DAPT, CKD, COPD, diabetes, hypertension, hyperlipidemia, hypothyroidism, neuropathy, RLS, arthritis, GERD, depression, who presents with headache and elevated BP. BP on presentation 176/84 with SBP max 193. Unclear etiology at this time- headache may be in the setting of elevated BP. No focal deficits noted on exam. Patient is a poor historian due to underlying cognitive impairment but denies headache upon re-evaluation with attending neurologist. MRI brain wo contrast without acute changes noted. Plan:  - Continue DAPT with aspirin 81 mg and Plavix 75 mg x 21 days, then transition to aspirin monotherapy (11/20/23) per previous admission  - Headache management:  S/p Magnesium sulfate 2 g IV x 1  S/p Tylenol 650 mg x 1  - Goal normotension; avoid hypotension  - PT/OT  - Monitor neuro exam; notify with any changes  - Medical management and supportive care per primary team. Correction of any metabolic or infectious disturbances.  - No further inpatient neurology recommendations at this time. Please call with any questions. Results:  - CT head: No acute intracranial abnormality.  - MRI brain:   No acute infarction. Stable moderate cerebral chronic microangiopathic changes when comparing to the October 30, 2023 study.   - Labs: WBC 12.07, creatinine 2.37, urine culture with mixed contaminants    Cognitive impairment  Assessment & Plan  - Delirium precautions  - Promote appropriate sleep-wake cycle  - PT/OT  - Outpatient [address] [place] [address] 7/30  - MRI neuro quant completed outpatient  - Follow up with neurology outpatient    Stroke [facility])  Assessment & Plan  - DAPT x 21 days, then transition to aspirin monotherapy on 11/20/23  - Continue statin  - Monitor neuro exam; notify with any changes  - MRI brain without acute infarct noted    * Hypertensive urgency  Assessment & Plan  - BP on presentation 176/84 with SBP max 193  - Goal normotension  - Medical management per primary team       Julio Barfield will need follow up in at the next regular appointment with movement disorder and memory attending/AP . She will not require outpatient neurological testing. Patient has follow up scheduled with Dr. Tito Howard on 12/11/23 at 10:00 am.    Case and treatment plan reviewed with attending neurologist, Dr. Moses Ramos. Please see attending attestation for any further recommendations. Subjective:   Patient resting in bed. She reports that her headache feels better but then rates the pain 8/10. She does not appear to be in distress. Her responses are delayed and require repeated cueing to respond. She also perseverated at times.  at bedside and notes that patient has been confused like this for approximately 1.5 months now. He says that patient's been evaluated outpatient and they have not found any specific cause. He noted that patient's mental status fluctuates and that there is no specific time that she is better vs worse. She tends to sleep more recently and has been having urinary incontinence at night. They put a brief on her when they go to bed but she will go to the bathroom at night and not replace the brief and end up having urinary incontinence in the bed.  is unsure of patient's sleep as he is usually sleeping as well. He notes that patient's mental status has gotten like this in the past with UTIs but she has not had any UTIs recently since onset of mental status change. He reports that patient presented to the hospital due to elevated BP readings at home. Patient had not mentioned a headache until she was in the ED.  notes that patient will tell one provider that her headache is really bad and then tell him that it's not that bad and that she does not appear to be in severe pain.     Per chart review, patient was seen by her PCP for transition of care in 08/2023 after a hospitalization for NIKOS and UTI. At that time, patient reported 6 months of "grogginess" and  noting 6 months of forgetfulness along with worsening depression. Patient was seen in the ED in 09/2023 for AMS and found to have UTI again and was discharged with antibiotics and PCP follow up along with neurology referral. She re-presented a few days later to the hospital for admission due to [address] and increasing confusion. She was evaluated by geriatrics and recommended for formal cognitive evaluation outpatient. Patient was evaluated by OT on 9/27/23- [facility] 7/30. She was seen in the neurology office by Dr. Mary Weller and there was concern for progressive dementia. Patient was recommended to complete MRI neuro quant for further evaluation, which resulted as without acute changes noted but "Normal hippocampal volume and enlarged ventricular system: Findings do not support hippocampal degeneration. Possible expansion of ventricular system without medial temporal lobe focused ex-vacuo process. " Patient was then subsequently admitted at the end of October for stroke.     Past Medical History:   Diagnosis Date    Actinic keratosis     Acute cystitis without hematuria 04/28/2023    Acute cystitis without hematuria 04/28/2023    Acute-on-chronic kidney injury      NIKOS (acute kidney injury) ([address]) 05/08/2020    Allergic     Allergic rhinitis     Ambulatory dysfunction 10/22/2020    AMS (altered mental status) 07/14/2020    Anesthesia     pt reports "had to use double lumen endo tube for hiatal hernia repair/so surgeon could get to where he needed to work"    Arthritis     Aspiration into airway     Michael esophagus 1993    Lot of stress with children    Basal cell carcinoma 2007    left cheek     BCC (basal cell carcinoma) 05/27/2021    Left Nasal tip    Breast discharge 06/19/2023    Breast pain 06/19/2023    Cancer ([address])     squamous cell cancer on forhead    Cancer ([address])     basal cell on nose     Cholelithiasis     Chronic kidney disease 2000, 2018    Stones, kidney disease stage 4    Chronic pain disorder     bilat feet and joint pain on occas    Colon polyp     Constipation     COPD (chronic obstructive pulmonary disease) ([address])     COVID-19 08/2021    recovered at home/did receive monoclonal infusion    COVID-19 virus infection 08/16/2021    Dental bridge present     Depression     Diabetes mellitus ([address])     Type 2    Diabetic neuropathy ([address])     Difficulty walking 2017    Disease of thyroid gland     Dyspnea     Elevated liver enzymes 04/04/2023    Epigastric abdominal pain with severe diabetic gastroparesis, status post EGD/Botox injection, 5/14 05/17/2021    Facial abrasion, initial encounter 03/22/2023    Fall 03/22/2023    Family history of thyroid problem     Fatty liver     Fibromyalgia, primary     Fracture of orbital floor, blow-out, right, closed, with routine healing, subsequent encounter 03/22/2023    GERD (gastroesophageal reflux disease)     Heart burn     Hiatal hernia     History of colon polyps 07/30/2021    History of kidney stones 09/29/2020    Hx of recurrent kidney stones    History of kidney stones 09/29/2020    Hx of recurrent kidney stones  Formatting of this note might be different from the original. Hx of recurrent kidney stones  Last Assessment & Plan:  Formatting of this note might be different from the original. We will set her up for follow-up in 3 months with a KUB prior. She knows to call if she has any kidney stone type pain in the meantime.     History of pneumonia     History of repair of hiatal hernia 05/03/2020    Hyperlipidemia     Hyperplasia, parathyroid ([address])     Hypertension     Hyponatremia 04/26/2023    Hypotension 04/13/2022    Kidney problem     Kidney stone     Leukocytosis 07/14/2020    Memory loss Julu2 2020    Motion sickness     Motion sickness     Multiple closed fractures of ribs of right side 03/22/2023    Nasal bones, closed fracture 03/22/2023    Neck pain     Obesity 1978    Obesity (BMI 30.0-34.9)     Other chest pain 09/13/2021    Peripheral neuropathy     Pollen allergies     RLS (restless legs syndrome)     S/P foot surgery 07/20/2022    SCC (squamous cell carcinoma) 05/04/2021    left mid forehead    SCC (squamous cell carcinoma) 2023    chest    Seasonal allergies     Shingles 01 05 23    Sleep apnea     has inspire    Squamous cell skin cancer 2007    left cheek     Swollen ankles     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, left     great toe fusion    Urinary incontinence     Urinary tract infection 03/28/2022    Wears glasses      Past Surgical History:   Procedure Laterality Date    ABDOMINAL SURGERY  [id number],0614    Nissen x2 1972 tubal ligarion    ARTHROSCOPY KNEE      BREAST BIOPSY      stereotactic-benign    BREAST BIOPSY      stereo-benign    BREAST CYST EXCISION Bilateral     benign    BREAST EXCISIONAL BIOPSY      unknown date-benign    BREAST EXCISIONAL BIOPSY      unknown date-benign    BREAST EXCISIONAL BIOPSY      unknown date-benign    BREAST EXCISIONAL BIOPSY      unknown age-benign    CARDIAC CATHETERIZATION      CHOLECYSTECTOMY      COLONOSCOPY      EXAMINATION UNDER ANESTHESIA N/A 06/24/2021    Procedure: EXAM UNDER ANESTHESIA (EUA), DISE;  Surgeon: Angelia Huerta MD;  [facility];  Service: ENT    HERNIA REPAIR      HIATAL HERNIA REPAIR      KNEE SURGERY      Kali powers lap surg    LIPOSUCTION      LYMPH NODE BIOPSY      MOHS SURGERY  05/20/2021    left mid forehead-Mickey    MOHS SURGERY Left 05/27/2021    Left nasal tip- mickey     IA LIGATION/BIOPSY TEMPORAL ARTERY Left 01/05/2023    Procedure: BIOPSY ARTERY TEMPORAL;  Surgeon: Chanda Cornell DO;  [facility];  Service: Vascular    IA OPEN IMPLANTATION CRANIAL NERVE BOOM & PULSE GEN N/A 11/10/2021    Procedure: INSERTION UPPER AIRWAY STIMULATOR, INSPIRE IMPLANT;  Surgeon: Angelia Huerta MD;  Location[facility];  Service: ENT    IA UNLISTED PROCEDURE FOOT/TOES Right 07/19/2022    Procedure: 1st metatarsal phalangeal joint fusion;  Surgeon: Dianne Parikh DPM;  Location[facility];  Service: [address] [place] [address] Road Bilateral 2000    REDUCTION MAMMAPLASTY      SKIN BIOPSY      SKIN CANCER EXCISION  2007    squamous cell carcinoma     SKIN CANCER EXCISION  2007    basal cell carcinoma    SKIN CANCER EXCISION  2023    SCCIS chest    SQUAMOUS CELL CARCINOMA EXCISION      TOE SURGERY Right 08/04/2022    Right Great Toe Fusion    TONSILLECTOMY      TUBAL LIGATION      UPPER GASTROINTESTINAL ENDOSCOPY      US GUIDED BREAST BIOPSY RIGHT COMPLETE Right 07/18/2022     Family History   Problem Relation Age of Onset    Heart disease Mother     Depression Mother     Hypertension Mother     COPD Mother     Hearing loss Mother     Anxiety disorder Mother     Heart disease Father     Lung cancer Father 79        Smoker     Cancer Father         brain    Alcohol abuse Father     Dementia Father     Hypertension Father     Thyroid disease Father     COPD Father     Arthritis Father     Brain cancer Father 76    Hypertension Sister     Diabetes Sister     Heart disease Sister     Thyroid disease Sister     Cancer Sister         Lympoma, lung    Lung cancer Sister 78    Anxiety disorder Sister     Migraines Sister     Hypertension Brother     Diabetes Brother     Cancer Brother         Throat    Dementia Brother     Stroke Brother     Hypertension Brother     Heart disease Brother     Diabetes Brother     Hypertension Brother     No Known Problems Son     No Known Problems Son     Brain cancer Paternal Aunt         unknown age    Diabetes Sister     Hypertension Sister     Diabetes Brother     Breast cancer Neg Hx      Social History     Socioeconomic History    Marital status: /Civil Union     Spouse name: Not on file    Number of children: Not on file    Years of education: Not on file    Highest education level: Not on file   Occupational History    Occupation: RETIRED   Tobacco Use    Smoking status: Former     Packs/day: 2.00     Years: 10.00     Total pack years: 20.00     Types: Cigarettes     Start date: 1966     Quit date: 1976     Years since quittin.8     Passive exposure: Past    Smokeless tobacco: Never    Tobacco comments:     Smoked 2 pack a day   Vaping Use    Vaping Use: Never used   Substance and Sexual Activity    Alcohol use: Yes     Comment: 1 or 2 a year    Drug use: No    Sexual activity: Not Currently     Partners: Male     Birth control/protection: Abstinence, Post-menopausal, Female Sterilization     Comment: defer   Other Topics Concern    Not on file   Social History Narrative    ** Merged History Encounter **          Social Determinants of Health     Financial Resource Strain: Low Risk  (2023)    Overall Financial Resource Strain (CARDIA)     Difficulty of Paying Living Expenses: Not hard at all   Food Insecurity: No Food Insecurity (2023)    Hunger Vital Sign     Worried About Running Out of Food in the Last Year: Never true     Ran Out of Food in the Last Year: Never true   Transportation Needs: No Transportation Needs (2023)    PRAPARE - Transportation     Lack of Transportation (Medical): No     Lack of Transportation (Non-Medical): No   Physical Activity: Not on file   Stress: Not on file   Social Connections: Not on file   Intimate Partner Violence: Not on file   Housing Stability: Low Risk  (2023)    Housing Stability Vital Sign     Unable to Pay for Housing in the Last Year: No     Number of Places Lived in the Last Year: 1     Unstable Housing in the Last Year: No         Medications:   All current active meds have been reviewed and current meds:  Scheduled Meds:  Current Facility-Administered Medications   Medication Dose Route Frequency Provider Last Rate    acetaminophen  650 mg Oral Once An K Long, CRNP      albuterol  2 puff Inhalation Q6H PRN Blanca Ewing MD amLODIPine  5 mg Oral Daily Erica Davila PA-C      aspirin  81 mg Oral Daily Maricarmen Pastrana MD      cephalexin  250 mg Oral HS Maricarmen Pastrana MD      clopidogrel  75 mg Oral Daily Maricarmen Pastrana MD      DULoxetine  30 mg Oral Daily Maricarmen Pastrana MD      famotidine  10 mg Oral Daily Maricarmen Pastrana MD      ferrous sulfate  325 mg Oral Daily With Breakfast Maricarmen Pastrana MD      gabapentin  100 mg Oral HS Maricarmen Pastrana MD      heparin (porcine)  5,000 Units Subcutaneous Q8H [address] Chirag Matthew PA-C      hydrALAZINE  5 mg Intravenous Q6H PRN Maricarmen Pastrana MD      insulin detemir  30 Units Subcutaneous HS Erica Davila PA-C      insulin lispro  1-5 Units Subcutaneous TID AC Erica Davila PA-C      insulin lispro  1-5 Units Subcutaneous HS Erica Davila PA-C      insulin lispro  5 Units Subcutaneous TID With Meals Erica Davila PA-C      levothyroxine  125 mcg Oral Early Morning Maricarmen Pastrana MD      losartan  25 mg Oral BID Erica Davila PA-C      metoclopramide  5 mg Intravenous Once Barrie Baker MD      metoprolol tartrate  50 mg Oral Q12H [address] [facility] Justin Noonan MD      pantoprazole  40 mg Oral BID AC Gita Cruz MD      pramipexole  0.25 mg Oral HS Gita Cruz MD      pravastatin  40 mg Oral Daily With [address] [facility] [address], MD       Continuous Infusions:   PRN Meds:.  albuterol    hydrALAZINE       ROS:   Review of Systems   Unable to perform ROS: Mental status change         Vitals:   BP (!) 183/85 (BP Location: Left arm)   Pulse 60   Temp 98.6 °F (37 °C)   Resp 18   SpO2 96%       Physical Exam:   Physical Exam  Vitals and nursing note reviewed. Constitutional:       General: She is not in acute distress. Appearance: Normal appearance. She is not ill-appearing. HENT:      Head: Normocephalic. Mouth/Throat:      Mouth: Mucous membranes are moist.      Pharynx: Oropharynx is clear. Eyes:      General: No scleral icterus. Right eye: No discharge. Left eye: No discharge. Extraocular Movements: Extraocular movements intact and EOM normal.      Conjunctiva/sclera: Conjunctivae normal.   Cardiovascular:      Rate and Rhythm: Normal rate. Pulmonary:      Effort: Pulmonary effort is normal. No respiratory distress. Musculoskeletal:         General: Normal range of motion. Cervical back: Normal range of motion. Skin:     General: Skin is warm and dry. Coloration: Skin is not jaundiced or pale. Neurological:      Mental Status: She is alert. Neurologic Exam     Mental Status   Attention: decreased. Concentration: decreased. Level of consciousness: alert  Delayed responses and perseverates with speech intermittently. Able to state her name but unable to state current location (states nursing home) and current year. Able to name a glove but then perseverates with glove. Able to follow some commands but requires encouragement. No dysarthria noted. Cranial Nerves     CN II   Visual fields full to confrontation. CN III, IV, VI   Extraocular motions are normal.     CN V   Facial sensation intact. CN VII   Facial expression full, symmetric. CN VIII   Hearing: intact    CN IX, X   Palate: symmetric    CN XI   CN XI normal.     CN XII   CN XII normal.     Motor Exam   Muscle bulk: normal  Bilateral UE strength 5/5 deltoids, biceps, triceps, hand   Bilateral LE strength 5/5 hip flexion, dorsiflexion, plantar flexion     Sensory Exam   Light touch normal.     Gait, Coordination, and Reflexes     Tremor   Resting tremor: absent  Intention tremor: absent    Reflexes   Right plantar: normal  Left plantar: normal          Labs: I have personally reviewed pertinent reports.    Recent Results (from the past 24 hour(s))   Fingerstick Glucose (POCT)    Collection Time: 11/08/23 11:28 AM   Result Value Ref Range    POC Glucose 214 (H) 65 - 140 mg/dl       Imaging: I have personally reviewed pertinent imaging in PACS, and I have personally reviewed PACS reports. EKG, Pathology, and Other Studies: I have personally reviewed pertinent reports. VTE Prophylaxis: Sequential compression device (Venodyne)         Total time spent today 39 minutes. Greater than 50% of total time was spent with the patient and / or family counseling and / or coordination of care. A description of the counseling / coordination of care: Patient seen and evaluated. Case reviewed with attending. Chart thoroughly reviewed including prior admissions, labs, medications, and imaging. Coordination of care with RN. Discussion of imaging and medications with patient's .

## 2023-11-08 NOTE — ASSESSMENT & PLAN NOTE
notes confusion for "a while" but recently worse over last 1-2 months. D/w neurology MOCA score outpt 7/30  Recommend outpt f/u with neurology, can send referral for geriatrics as well.   Likely baseline confusion worsened due to recent stroke/UTI hx/hospitalizations recently   PT/OT evals

## 2023-11-08 NOTE — ASSESSMENT & PLAN NOTE
Recently admitted on 10/27/2023 with stroke alert status post TNK   MRI brain 10/30 foci of late acute infarct involving the right putamen. Recommended DAPT with aspirin and Plavix through 11/20, then ASA monotherapy -- ordered   Statin   Patient returned on 11/1/2023 with complaints of headache, repeat CT head on 11/1/2023 was negative. Neurology following given c/o headaches, elevated BP, and continued confusion. CT head on admission negative. Repeat MRI brain unremarkable.

## 2023-11-08 NOTE — PROGRESS NOTES
[facility]  Progress Note  Name: Judith Foster I  MRN: [id number]  Unit/Bed#: S -01 I Date of Admission: 11/7/2023   Date of Service: 11/8/2023 I Hospital Day: 1    Assessment/Plan   * Hypertensive urgency  Assessment & Plan  Patient presents with complaints of elevated blood pressure and headache, did not receive any medication in the emergency department. Per ED physician patient felt to be somewhat confused while she was in the emergency department-and hence requested for admission. Patient recently had stroke, discharged on 10/30/2023. CT head negative on admission   Neurology input appreciated with recent CVA. Allowed permissive HTN on admission though repeat MRI brain negative, will resume BP meds and monitor   Only takes torsemide PRN for weight gain   Continue metoprolol. PRN IV hydralazine ordered on admission     Cognitive impairment  Assessment & Plan   notes confusion for "a while" but recently worse over last 1-2 months. D/w neurology MOCA score outpt 7/30  Recommend outpt f/u with neurology, can send referral for geriatrics as well. Likely baseline confusion worsened due to recent stroke/UTI hx/hospitalizations recently   PT/OT evals     Headache  Assessment & Plan  POA with elevated BP. Recent stroke  Neurology consult appreciated, ordered magnesium and tylenol (pt refused tylenol)   Pt denies HA to me today. Though per d/w neurology and  her answers are inconsistent and she told neuro this AM she had LIU. She appears comfortable. Type 2 diabetes mellitus ([facility])  Assessment & Plan  Lab Results   Component Value Date    HGBA1C 7.1 (H) 10/28/2023       No results for input(s): "POCGLU" in the last 72 hours. Blood Sugar Average: Last 72 hrs:  Home regimen: Levemir 50 units QHS, novolog 18/18/25   She was not ordered any insulin on admission. Will order levemir 30 units QHS, humalog 5 units TID, SSI, and accu checks. Monitor PRN.      CKD (chronic kidney disease) stage 4, GFR 15-29 ml/min [facility])  Assessment & Plan  Lab Results   Component Value Date    EGFR 19 11/07/2023    EGFR 19 11/01/2023    EGFR 20 10/30/2023    CREATININE 2.37 (H) 11/07/2023    CREATININE 2.42 (H) 11/01/2023    CREATININE 2.33 (H) 10/30/2023   Renal function at baseline. Monitor. History of recurrent UTIs  Assessment & Plan  On Keflex empirically -- continue   Outpt f/u with urology   Urine culture negative     Stroke [facility])  Assessment & Plan  Recently admitted on 10/27/2023 with stroke alert status post TNK   MRI brain 10/30 foci of late acute infarct involving the right putamen. Recommended DAPT with aspirin and Plavix through 11/20, then ASA monotherapy -- ordered   Statin   Patient returned on 11/1/2023 with complaints of headache, repeat CT head on 11/1/2023 was negative. Neurology following given c/o headaches, elevated BP, and continued confusion. CT head on admission negative. Repeat MRI brain unremarkable. VTE Pharmacologic Prophylaxis:    pending MRI B    Patient Centered Rounds: I performed bedside rounds with nursing staff today. Discussions with Specialists or Other Care Team Provider: neurology, will d/w CM     Education and Discussions with Family / Patient: Updated  () at bedside. Total Time Spent on Date of Encounter in care of patient: 35 mins. This time was spent on one or more of the following: performing physical exam; counseling and coordination of care; obtaining or reviewing history; documenting in the medical record; reviewing/ordering tests, medications or procedures; communicating with other healthcare professionals and discussing with patient's family/caregivers.     Current Length of Stay: 1 day(s)  Current Patient Status: Inpatient   Certification Statement: The patient will continue to require additional inpatient hospital stay due to final neuro recs, PT/OT  Discharge Plan: Anticipate discharge in 24-48 hrs to discharge location to be determined pending rehab evaluations. Code Status: Level 3 - DNAR and DNI    Subjective:   No acute complaints at this time, she denies any headaches, visual change. Just got back from MRI.  at bedside provides most of hx. Reports confusion x months but markedly worse last 1.5 months. Objective:     Vitals:   Temp (24hrs), Av.7 °F (37.1 °C), Min:97.9 °F (36.6 °C), Max:99.7 °F (37.6 °C)    Temp:  [97.9 °F (36.6 °C)-99.7 °F (37.6 °C)] 98.6 °F (37 °C)  HR:  [60-80] 60  Resp:  [18] 18  BP: (154-193)/(62-95) 183/85  SpO2:  [91 %-98 %] 96 %  There is no height or weight on file to calculate BMI. Input and Output Summary (last 24 hours): Intake/Output Summary (Last 24 hours) at 2023 1211  Last data filed at 2023 2201  Gross per 24 hour   Intake 0 ml   Output 0 ml   Net 0 ml       Physical Exam:   Physical Exam  Vitals reviewed. Constitutional:       General: She is not in acute distress. Appearance: She is not toxic-appearing. HENT:      Head: Normocephalic and atraumatic. Eyes:      Extraocular Movements: Extraocular movements intact. Cardiovascular:      Rate and Rhythm: Normal rate and regular rhythm. Pulmonary:      Effort: Pulmonary effort is normal.      Breath sounds: Normal breath sounds. Abdominal:      General: Bowel sounds are normal. There is no distension. Palpations: Abdomen is soft. Tenderness: There is no abdominal tenderness. Musculoskeletal:         General: Normal range of motion. Neurological:      General: No focal deficit present. Mental Status: She is alert and oriented to person, place, and time. Psychiatric:         Mood and Affect: Mood normal.         Behavior: Behavior normal.         Thought Content:  Thought content normal.          Additional Data:     Labs:  Results from last 7 days   Lab Units 23  0809   WBC Thousand/uL 12.07*   HEMOGLOBIN g/dL 12.0   HEMATOCRIT % 36.1 PLATELETS Thousands/uL 270   NEUTROS PCT % 74   LYMPHS PCT % 17   MONOS PCT % 6   EOS PCT % 2     Results from last 7 days   Lab Units 11/07/23  0809   SODIUM mmol/L 137   POTASSIUM mmol/L 3.6   CHLORIDE mmol/L 103   CO2 mmol/L 28   BUN mg/dL 29*   CREATININE mg/dL 2.37*   ANION GAP mmol/L 6   CALCIUM mg/dL 8.8   ALBUMIN g/dL 3.4*   TOTAL BILIRUBIN mg/dL 0.39   ALK PHOS U/L 55   ALT U/L 27   AST U/L 20   GLUCOSE RANDOM mg/dL 121         Results from last 7 days   Lab Units 11/08/23  1128 11/01/23  1425   POC GLUCOSE mg/dl 214* 86               Lines/Drains:  Invasive Devices       Peripheral Intravenous Line  Duration             Peripheral IV 11/07/23 Left Antecubital 1 day                      Telemetry:  Telemetry Orders (From admission, onward)               24 Hour Telemetry Monitoring  Continuous x 24 Hours (Telem)        Expiring   Question:  Reason for 24 Hour Telemetry  Answer:  PCI/EP study (including pacer and ICD implementation), Cardiac surgery, MI, abnormal cardiac cath, and chest pain- rule out MI                                Imaging: Reviewed radiology reports from this admission including: MRI brain    Recent Cultures (last 7 days):   Results from last 7 days   Lab Units 11/07/23  1203 11/06/23  1003   URINE CULTURE  <10,000 cfu/ml 10,000-19,000 cfu/ml       Last 24 Hours Medication List:   Current Facility-Administered Medications   Medication Dose Route Frequency Provider Last Rate    acetaminophen  650 mg Oral Once An K Long, CRNP      albuterol  2 puff Inhalation Q6H PRN Raymundo Denson MD      amLODIPine  5 mg Oral Daily Erica Davila PA-C      aspirin  81 mg Oral Daily Gita Rogers MD      cephalexin  250 mg Oral HS Gita Rogers MD      clopidogrel  75 mg Oral Daily Raymundo Denson MD      DULoxetine  30 mg Oral Daily Raymundo Denson MD      famotidine  10 mg Oral Daily Raymundo Denson MD      ferrous sulfate  325 mg Oral Daily With Breakfast Gita ROSE Tamika Méndez MD      gabapentin  100 mg Oral HS Anne Banda MD      heparin (porcine)  5,000 Units Subcutaneous Q8H [address] Erica Davila PA-C      hydrALAZINE  5 mg Intravenous Q6H PRN Anne Banda MD      insulin detemir  30 Units Subcutaneous HS Erica Davila PA-C      insulin lispro  1-5 Units Subcutaneous TID AC Erica Davila PA-C      insulin lispro  1-5 Units Subcutaneous HS Erica Davila PA-C      insulin lispro  5 Units Subcutaneous TID With Meals Erica Davila PA-C      levothyroxine  125 mcg Oral Early Morning Anne Banda MD      losartan  25 mg Oral BID Erica Davila PA-C      metoclopramide  5 mg Intravenous Once Cody Pierre MD      metoprolol tartrate  50 mg Oral Q12H [address], MD      pantoprazole  40 mg Oral BID AC Gita Thorpe MD      pramipexole  0.25 mg Oral HS Gita Thorpe MD      pravastatin  40 mg Oral Daily With Evans Be MD          Today, Patient Was Seen By: Lindy Greenberg PA-C    **Please Note: This note may have been constructed using a voice recognition system. **

## 2023-11-08 NOTE — ASSESSMENT & PLAN NOTE
- Delirium precautions  - Promote appropriate sleep-wake cycle  - PT/OT  - Outpatient [facility] 7/30  - MRI neuro quant completed outpatient  - Follow up with neurology outpatient

## 2023-11-09 VITALS
TEMPERATURE: 97.6 F | SYSTOLIC BLOOD PRESSURE: 143 MMHG | RESPIRATION RATE: 16 BRPM | HEART RATE: 73 BPM | DIASTOLIC BLOOD PRESSURE: 83 MMHG | OXYGEN SATURATION: 93 %

## 2023-11-09 LAB
ANION GAP SERPL CALCULATED.3IONS-SCNC: 7 MMOL/L
BASOPHILS # BLD AUTO: 0.03 THOUSANDS/ÂΜL (ref 0–0.1)
BASOPHILS NFR BLD AUTO: 0 % (ref 0–1)
BUN SERPL-MCNC: 36 MG/DL (ref 5–25)
CALCIUM SERPL-MCNC: 8.4 MG/DL (ref 8.4–10.2)
CHLORIDE SERPL-SCNC: 103 MMOL/L (ref 96–108)
CO2 SERPL-SCNC: 28 MMOL/L (ref 21–32)
CREAT SERPL-MCNC: 2.49 MG/DL (ref 0.6–1.3)
EOSINOPHIL # BLD AUTO: 0.33 THOUSAND/ÂΜL (ref 0–0.61)
EOSINOPHIL NFR BLD AUTO: 4 % (ref 0–6)
ERYTHROCYTE [DISTWIDTH] IN BLOOD BY AUTOMATED COUNT: 15.2 % (ref 11.6–15.1)
GFR SERPL CREATININE-BSD FRML MDRD: 18 ML/MIN/1.73SQ M
GLUCOSE SERPL-MCNC: 123 MG/DL (ref 65–140)
GLUCOSE SERPL-MCNC: 126 MG/DL (ref 65–140)
GLUCOSE SERPL-MCNC: 184 MG/DL (ref 65–140)
HCT VFR BLD AUTO: 35.8 % (ref 34.8–46.1)
HGB BLD-MCNC: 11.4 G/DL (ref 11.5–15.4)
IMM GRANULOCYTES # BLD AUTO: 0.03 THOUSAND/UL (ref 0–0.2)
IMM GRANULOCYTES NFR BLD AUTO: 0 % (ref 0–2)
LYMPHOCYTES # BLD AUTO: 2.27 THOUSANDS/ÂΜL (ref 0.6–4.47)
LYMPHOCYTES NFR BLD AUTO: 27 % (ref 14–44)
MCH RBC QN AUTO: 28.3 PG (ref 26.8–34.3)
MCHC RBC AUTO-ENTMCNC: 31.8 G/DL (ref 31.4–37.4)
MCV RBC AUTO: 89 FL (ref 82–98)
MONOCYTES # BLD AUTO: 0.85 THOUSAND/ÂΜL (ref 0.17–1.22)
MONOCYTES NFR BLD AUTO: 10 % (ref 4–12)
NEUTROPHILS # BLD AUTO: 4.87 THOUSANDS/ÂΜL (ref 1.85–7.62)
NEUTS SEG NFR BLD AUTO: 59 % (ref 43–75)
NRBC BLD AUTO-RTO: 0 /100 WBCS
PLATELET # BLD AUTO: 254 THOUSANDS/UL (ref 149–390)
PMV BLD AUTO: 11.3 FL (ref 8.9–12.7)
POTASSIUM SERPL-SCNC: 3.5 MMOL/L (ref 3.5–5.3)
RBC # BLD AUTO: 4.03 MILLION/UL (ref 3.81–5.12)
SODIUM SERPL-SCNC: 138 MMOL/L (ref 135–147)
WBC # BLD AUTO: 8.38 THOUSAND/UL (ref 4.31–10.16)

## 2023-11-09 PROCEDURE — 97535 SELF CARE MNGMENT TRAINING: CPT

## 2023-11-09 PROCEDURE — 80048 BASIC METABOLIC PNL TOTAL CA: CPT | Performed by: PHYSICIAN ASSISTANT

## 2023-11-09 PROCEDURE — 97163 PT EVAL HIGH COMPLEX 45 MIN: CPT

## 2023-11-09 PROCEDURE — 82948 REAGENT STRIP/BLOOD GLUCOSE: CPT

## 2023-11-09 PROCEDURE — 85025 COMPLETE CBC W/AUTO DIFF WBC: CPT | Performed by: PHYSICIAN ASSISTANT

## 2023-11-09 PROCEDURE — 99239 HOSP IP/OBS DSCHRG MGMT >30: CPT | Performed by: PHYSICIAN ASSISTANT

## 2023-11-09 PROCEDURE — 97167 OT EVAL HIGH COMPLEX 60 MIN: CPT

## 2023-11-09 RX ADMIN — FERROUS SULFATE TAB 325 MG (65 MG ELEMENTAL FE) 325 MG: 325 (65 FE) TAB at 07:59

## 2023-11-09 RX ADMIN — PANTOPRAZOLE SODIUM 40 MG: 40 TABLET, DELAYED RELEASE ORAL at 05:39

## 2023-11-09 RX ADMIN — FAMOTIDINE 10 MG: 20 TABLET ORAL at 08:01

## 2023-11-09 RX ADMIN — AMLODIPINE BESYLATE 5 MG: 5 TABLET ORAL at 08:01

## 2023-11-09 RX ADMIN — HEPARIN SODIUM 5000 UNITS: 5000 INJECTION INTRAVENOUS; SUBCUTANEOUS at 05:39

## 2023-11-09 RX ADMIN — LEVOTHYROXINE SODIUM 125 MCG: 125 TABLET ORAL at 05:39

## 2023-11-09 RX ADMIN — ASPIRIN 81 MG: 81 TABLET, COATED ORAL at 08:00

## 2023-11-09 RX ADMIN — METOPROLOL TARTRATE 50 MG: 50 TABLET, FILM COATED ORAL at 08:00

## 2023-11-09 RX ADMIN — LOSARTAN POTASSIUM 25 MG: 25 TABLET, FILM COATED ORAL at 08:00

## 2023-11-09 RX ADMIN — INSULIN LISPRO 5 UNITS: 100 INJECTION, SOLUTION INTRAVENOUS; SUBCUTANEOUS at 12:18

## 2023-11-09 RX ADMIN — DULOXETINE HYDROCHLORIDE 30 MG: 30 CAPSULE, DELAYED RELEASE ORAL at 08:00

## 2023-11-09 RX ADMIN — CLOPIDOGREL BISULFATE 75 MG: 75 TABLET ORAL at 08:00

## 2023-11-09 RX ADMIN — INSULIN LISPRO 1 UNITS: 100 INJECTION, SOLUTION INTRAVENOUS; SUBCUTANEOUS at 12:18

## 2023-11-09 RX ADMIN — INSULIN LISPRO 5 UNITS: 100 INJECTION, SOLUTION INTRAVENOUS; SUBCUTANEOUS at 07:59

## 2023-11-09 NOTE — ASSESSMENT & PLAN NOTE
Recently admitted on 10/27/2023 with stroke alert status post TNK   MRI brain 10/30 foci of late acute infarct involving the right putamen. Recommended DAPT with aspirin and Plavix through 11/20, then ASA monotherapy -- ordered   Statin   Patient returned on 11/1/2023 with complaints of headache, repeat CT head on 11/1/2023 was negative. Neurology following given c/o headaches, elevated BP, and continued confusion. CT head on admission negative. Repeat MRI brain unremarkable.   Can f/u with neuro outpt

## 2023-11-09 NOTE — DISCHARGE SUMMARY
[facility]  Discharge- Jacquie Suresh 1950, 75 y.o. female MRN: [id number]  Unit/Bed#: S -01 Encounter: [id number]  Primary Care Provider: Abdoulaye Deluna MD   Date and time admitted to hospital: 11/7/2023  7:36 AM    * Hypertensive urgency  Assessment & Plan  Patient presents with complaints of elevated blood pressure and headache, did not receive any medication in the emergency department. Per ED physician patient felt to be somewhat confused while she was in the emergency department-and hence requested for admission. Patient recently had stroke, discharged on 10/30/2023. CT head negative on admission   Neurology input appreciated with recent CVA. Allowed permissive HTN on admission though repeat MRI brain negative, will resume BP meds and monitor   Only takes torsemide PRN for weight gain   Continue metoprolol. BP is improved today. Advised pt and  to keep close log of BP at home and f/u outpt for further adjustment PRN     Cognitive impairment  Assessment & Plan   notes confusion for "a while" but recently worse over last 1-2 months. D/w neurology MOCA score outpt 7/30  Recommend outpt f/u with neurology, can send referral for geriatrics as well. Likely baseline confusion worsened due to recent stroke/UTI hx/hospitalizations recently   Outpt f/u with neurology    Headache  Assessment & Plan  POA with elevated BP. Recent stroke  Neurology consult appreciated, ordered magnesium and tylenol (pt refused tylenol)   Improving     Type 2 diabetes mellitus [facility])  Assessment & Plan  Lab Results   Component Value Date    HGBA1C 7.1 (H) 10/28/2023       Recent Labs     11/08/23  1653 11/08/23  2110 11/09/23  0747 11/09/23  1046   POCGLU 188* 155* 126 184*       Blood Sugar Average: Last 72 hrs:  (P) 173.4Home regimen: Levemir 50 units QHS, novolog 18/18/25   She was not ordered any insulin on admission.   Will order levemir 30 units QHS, humalog 5 units TID, SSI, and accu checks. Monitor PRN. Can resume home RX on discharge     CKD (chronic kidney disease) stage 4, GFR 15-29 ml/min [facility])  Assessment & Plan  Lab Results   Component Value Date    EGFR 18 11/09/2023    EGFR 19 11/07/2023    EGFR 19 11/01/2023    CREATININE 2.49 (H) 11/09/2023    CREATININE 2.37 (H) 11/07/2023    CREATININE 2.42 (H) 11/01/2023   Renal function at baseline. Monitor. History of recurrent UTIs  Assessment & Plan  On Keflex empirically -- continue   Outpt f/u with urology   Urine culture negative     HLD (hyperlipidemia)  Assessment & Plan  Continue on statin. Stroke [facility])  Assessment & Plan  Recently admitted on 10/27/2023 with stroke alert status post TNK   MRI brain 10/30 foci of late acute infarct involving the right putamen. Recommended DAPT with aspirin and Plavix through 11/20, then ASA monotherapy -- ordered   Statin   Patient returned on 11/1/2023 with complaints of headache, repeat CT head on 11/1/2023 was negative. Neurology following given c/o headaches, elevated BP, and continued confusion. CT head on admission negative. Repeat MRI brain unremarkable. Can f/u with neuro outpt         Medical Problems       Resolved Problems  Date Reviewed: 11/9/2023   None       Discharging Physician / Practitioner: Eduar Villasenor PA-C  PCP: Anna Julian MD  Admission Date:   Admission Orders (From admission, onward)       Ordered        11/07/23 1300  INPATIENT ADMISSION  Once                          Discharge Date: 11/09/23    Consultations During Hospital Stay:  Neurology     Procedures Performed:   None    Significant Findings / Test Results:   CT head: No acute intracranial abnormality   MRI brain: No acute findings   Elevated BP, now improved   Urine culture negative     Incidental Findings:   None     Test Results Pending at Discharge (will require follow up):    None     Outpatient Tests Requested:  None    Complications:  None    Reason for Admission: [facility] urgency     Hospital Course:   Judith Foster is a 75 y.o. female patient who originally presented to the hospital on 11/7/2023 due to elevated blood pressures, headaches. ED provider also noted some confusion. Her  notes she has had confusion x months but worse last 1.5 months. She had recent CVA. She was admitted to hospital for evaluation. CT head on admission was negative. She was seen by neuro, repeat MRI brain negative. She was given migraine cocktail with magnesium. BP is improved today without any change to her regimen. Her sx of headaches, confusion is improved with BP control. Advised pt and  to keep close log of BP at home daily and to have close f/u with nephro and PCP for managing this. She should f/u with neurology on discharge. No further IP recs. Stable for discharge. Please see above list of diagnoses and related plan for additional information. Condition at Discharge: good    Discharge Day Visit / Exam:   Subjective:  No complaints, just wants to go home. Very mild HA which she rates 2/10. Appetite is good . Vitals: Blood Pressure: 154/82 (11/09/23 0750)  Pulse: 73 (11/08/23 2306)  Temperature: 97.6 °F (36.4 °C) (11/09/23 0750)  Temp Source: Oral (11/09/23 0750)  Respirations: 16 (11/09/23 0750)  SpO2: 93 % (11/08/23 2306)  Exam:   Physical Exam  Vitals reviewed. Constitutional:       General: She is not in acute distress. Appearance: She is not toxic-appearing. HENT:      Head: Normocephalic and atraumatic. Eyes:      Extraocular Movements: Extraocular movements intact. Cardiovascular:      Rate and Rhythm: Normal rate and regular rhythm. Pulmonary:      Effort: Pulmonary effort is normal. No respiratory distress. Breath sounds: Normal breath sounds. Abdominal:      General: Bowel sounds are normal. There is no distension. Palpations: Abdomen is soft. Tenderness: There is no abdominal tenderness.    Musculoskeletal:         General: Normal range of motion. Skin:     General: Skin is warm and dry. Neurological:      General: No focal deficit present. Mental Status: She is alert and oriented to person, place, and time. Psychiatric:         Mood and Affect: Mood normal.         Thought Content: Thought content normal.         Discussion with Family: Updated  () at bedside. Discharge instructions/Information to patient and family:   See after visit summary for information provided to patient and family. Provisions for Follow-Up Care:  See after visit summary for information related to follow-up care and any pertinent home health orders. Disposition:   Home    Planned Readmission: no     Discharge Statement:  I spent 35 minutes discharging the patient. This time was spent on the day of discharge. I had direct contact with the patient on the day of discharge. Greater than 50% of the total time was spent examining patient, answering all patient questions, arranging and discussing plan of care with patient as well as directly providing post-discharge instructions. Additional time then spent on discharge activities. Discharge Medications:  See after visit summary for reconciled discharge medications provided to patient and/or family.       **Please Note: This note may have been constructed using a voice recognition system**

## 2023-11-09 NOTE — PLAN OF CARE
Problem: OCCUPATIONAL THERAPY ADULT  Goal: Performs self-care activities at highest level of function for planned discharge setting. See evaluation for individualized goals. Description: Treatment Interventions: ADL retraining, Functional transfer training, Cognitive reorientation, Patient/family training, Compensatory technique education, Equipment evaluation/education, Activityengagement  Equipment Recommended: Shower/Tub chair with back ($) (RECOMMEND PT TO LOOK IN TO HAND HELD SHOWER)       See flowsheet documentation for full assessment, interventions and recommendations. Note: Limitation: Decreased Safe judgement during ADL, Decreased cognition, Decreased high-level ADLs (impaired balance, fxnl mobility, fxnl reach, attention to task, direction following, communication, safety awareness, insight, sequencing, orientation, problem solving, learning new tasks,  speech(noted occasional word finding deficits))  Prognosis: Good  Assessment: Pt is a 75 y.o. female seen for OT evaluation s/p admission to [address] [place] [address] on 11/7/2023  due to headache and inc'd confusion. Pt diagnosis: Hypertensive urgency. Pt with recent CVA. Pt's PMH impacting occupational performance is listed above. Pt's PLOF also listed in above flowsheet. Pt receives assistance at baseline for ADLs due to cognitive deficits, but performs transfers and functional mobility at mod (I)/(I) level. Pt is motivated to return to home with spouse. Personal and environmental factors supporting pt at time of IE include supportive spouse, attitude towards recovery, and accessible home environment. During evaluation pt performed as is outlined above in flowsheet. Standardized assessments used to assist in identifying performance deficits include AMPAC 6-Clicks. Pt's raw score on the AM-PAC Daily activity inpatient short form is 19, standardized score is 40.22, which is greater than 39.4. Patients at this level are likely to benefit from DC to home. Performance deficits that affect the pt’s occupational performance can be seen above. Due to pt's current functional limitations and medical complications, pt is functioning below baseline level of independence. Pt would benefit from continued skilled OT treatment in order to maximize safety, independence and overall performance with ADLs, functional transfers, and cognition in order to achieve highest level of function. Based on functional performance deficits and assessments, this evaluation is identified as a high complexity evaluation.      Rehab Resource Intensity Level, OT: III (Minimum Resource Intensity)

## 2023-11-09 NOTE — PHYSICAL THERAPY NOTE
PHYSICAL THERAPY EVALUATION NOTE    Patient Name: Shade Coombs  FKSTN'X Date: 11/9/2023  AGE:   75 y.o.   Mrn:   [id number]  ADMIT DX:  Proteinuria [R80.9]  Elevated blood pressure reading [R03.0]  BP (high blood pressure) [I10]  Hematuria [R31.9]  Hypertensive urgency [I16.0]  Acute headache [R51.9]  Acute encephalopathy [G93.40]  Generalized weakness [R53.1]    Past Medical History:   Diagnosis Date    Actinic keratosis     Acute cystitis without hematuria 04/28/2023    Acute cystitis without hematuria 04/28/2023    Acute-on-chronic kidney injury      NIKOS (acute kidney injury) ([address]) 05/08/2020    Allergic     Allergic rhinitis     Ambulatory dysfunction 10/22/2020    AMS (altered mental status) 07/14/2020    Anesthesia     pt reports "had to use double lumen endo tube for hiatal hernia repair/so surgeon could get to where he needed to work"    Arthritis     Aspiration into airway     Michael esophagus 1993    Lot of stress with children    Basal cell carcinoma 2007    left cheek     BCC (basal cell carcinoma) 05/27/2021    Left Nasal tip    Breast discharge 06/19/2023    Breast pain 06/19/2023    Cancer ([address])     squamous cell cancer on forhead    Cancer ([address])     basal cell on nose     Cholelithiasis     Chronic kidney disease 2000, 2018    Stones, kidney disease stage 4    Chronic pain disorder     bilat feet and joint pain on occas    Colon polyp     Constipation     COPD (chronic obstructive pulmonary disease) ([address])     COVID-19 08/2021    recovered at home/did receive monoclonal infusion    COVID-19 virus infection 08/16/2021    Dental bridge present     Depression     Diabetes mellitus ([address])     Type 2    Diabetic neuropathy ([address])     Difficulty walking 2017    Disease of thyroid gland     Dyspnea     Elevated liver enzymes 04/04/2023    Epigastric abdominal pain with severe diabetic gastroparesis, status post EGD/Botox injection, 5/14 05/17/2021    Facial abrasion, initial encounter 03/22/2023    Fall 03/22/2023    Family history of thyroid problem     Fatty liver     Fibromyalgia, primary     Fracture of orbital floor, blow-out, right, closed, with routine healing, subsequent encounter 03/22/2023    GERD (gastroesophageal reflux disease)     Heart burn     Hiatal hernia     History of colon polyps 07/30/2021    History of kidney stones 09/29/2020    Hx of recurrent kidney stones    History of kidney stones 09/29/2020    Hx of recurrent kidney stones  Formatting of this note might be different from the original. Hx of recurrent kidney stones  Last Assessment & Plan:  Formatting of this note might be different from the original. We will set her up for follow-up in 3 months with a KUB prior. She knows to call if she has any kidney stone type pain in the meantime.     History of pneumonia     History of repair of hiatal hernia 05/03/2020    Hyperlipidemia     Hyperplasia, parathyroid ([address])     Hypertension     Hyponatremia 04/26/2023    Hypotension 04/13/2022    Kidney problem     Kidney stone     Leukocytosis 07/14/2020    Memory loss Julu2 2020    Motion sickness     Motion sickness     Multiple closed fractures of ribs of right side 03/22/2023    Nasal bones, closed fracture 03/22/2023    Neck pain     Obesity 1978    Obesity (BMI 30.0-34.9)     Other chest pain 09/13/2021    Peripheral neuropathy     Pollen allergies     RLS (restless legs syndrome)     S/P foot surgery 07/20/2022    SCC (squamous cell carcinoma) 05/04/2021    left mid forehead    SCC (squamous cell carcinoma) 2023    chest    Seasonal allergies     Shingles 01 05 23    Sleep apnea     has inspire    Squamous cell skin cancer 2007    left cheek     Swollen ankles     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 ambulates w/ cane or rollator. receives assistance w/ ADL as needed. requires assistance w/ IADLs. 6 falls in last 6 months. General   Additional Pertinent History room air resting pulse ox 96% and 73 BPM   Family/Caregiver Present No   Cognition   Overall Cognitive Status Impaired   Attention Attends with cues to redirect   Orientation Level Oriented to person;Oriented to place; Other (Comment)  (pt was identified w/ full name, birth date)   Following Commands Follows one step commands with increased time or repetition   Comments pt seen sitting on extended leg rest of recliner chair donning shoes. pt was instructed to slide back into chair and LEs were then lowered to floor. Subjective   Subjective pt seen sitting out of bed. agreed to PT eval. denied pain or dizziness. input was needed for task focus. RUE Assessment   RUE Assessment WFL   LUE Assessment   LUE Assessment WFL   RLE Assessment   RLE Assessment WFL  (3+ to 4-/5)   LLE Assessment   LLE Assessment WFL  (3+ to 4-/5)   Light Touch   RLE Light Touch Grossly intact   LLE Light Touch Grossly intact   Transfers   Sit to Stand 5  Supervision   Additional items Increased time required   Stand to Sit 5  Supervision   Additional items Increased time required;Verbal cues  (for body positioning)   Ambulation/Elevation   Gait pattern Wide RAJANI; Foward flexed; Short stride   Gait Assistance 5  Supervision   Additional items Verbal cues  (for walker positioning, full step length)   Assistive Device Rolling walker   Distance 120 feet x2 w/ standing rest break  (additional ambulation was not possible due to fatigue, dyspnea)   Balance   Static Sitting Good   Dynamic Sitting Fair +   Static Standing Fair +  (w/ roller walker)   Ambulatory Fair -  (w/ roller walker)   Activity Tolerance   Activity Tolerance Patient limited by fatigue   Nurse Made Aware spoke to Gwen Moulton CM   Assessment   Problem List Decreased strength;Decreased endurance; Impaired balance;Decreased mobility; Decreased cognition;Decreased safety awareness   Assessment Pt presents with complaints of elevated blood pressure, headache. Dx: headache, hypertensive urgency, DM, cognitive impairment, CKD, and UTI. order placed for PT eval and tx. pt presents w/ comorbidities of ambulatory dysfunction, arthritis, aspiration, CKD, chronic pain disorder, DM, hyperlipidemia, HTN, obesity, left great toe fusion, UTI, and orbital wall fx and personal factors of advanced age, decreased cognition, positive fall history, depression, and inability to perform IADLs. pt presents w/ weakness, decreased endurance, impaired cognition, impaired balance, gait deviations, decreased safety awareness, and fall risk. these impairments are evident in findings from physical examination (weakness), mobility assessment (need for standby assist w/ all phases of mobility when usually mobilizing independently, tolerance to only 140 feet of ambulation, and need for cueing for mobility technique), and Barthel Index: 60/100 and Comfortable Gait Speed: 0.41 m/s (less than 1.0 m/s indicates need for intervention to address falls risk). pt needed input for task focus and mobility technique/safety. pt is at risk for falls due to physical and safety awareness deficits. pt's clinical presentation is unstable/unpredictable (evident in need for assist w/ all phases of mobility when usually mobilizing independently, tolerance to only 120 feet of ambulation, and need for input for task focus and mobility technique).  pt needs inpatient PT tx to improve mobility deficits and progress mobility training as appropriate. Gerontology consult would benefit pt to address cognition and aging related issues. Goals   Patient Goals go home. see my dog. STG Expiration Date 11/19/23   Short Term Goal #1 pt will: Increase bilateral LE strength 1/2 grade to facilitate independent mobility, Perform bed mobility independently to increase level of independence, Perform all transfers independently to improve independence, Ambulate 300 ft. with least restrictive assistive device independently w/o LOB to improve functional independence, Increase ambulatory balance 1 grade to decrease risk for falls, Tolerate 3 hr OOB to faciliate upright tolerance, Improve gait speed to 0.51 m/s to reduce fall risk and increase independence, and Improve Barthel Index score to 80 or greater to facilitate independence   PT Treatment Day 0   Plan   Treatment/Interventions Functional transfer training;LE strengthening/ROM; Therapeutic exercise; Endurance training;Cognitive reorientation;Patient/family training;Equipment eval/education; Bed mobility;Gait training   PT Frequency 2-3x/wk   Discharge Recommendation   Rehab Resource Intensity Level, PT III (Minimum Resource Intensity)   AM-PAC Basic Mobility Inpatient   Turning in Flat Bed Without Bedrails 4   Lying on Back to Sitting on Edge of Flat Bed Without Bedrails 3   Moving Bed to Chair 3   Standing Up From Chair Using Arms 3   Walk in Room 3   Climb 3-5 Stairs With Railing 1   Basic Mobility Inpatient Raw Score 17   Basic Mobility Standardized Score 39.67   Highest Level Of Mobility   JH-HLM Goal 5: Stand one or more mins   JH-HLM Achieved 7: Walk 25 feet or more   Barthel Index   Feeding 10   Bathing 0   Grooming Score 5   Dressing Score 5   Bladder Score 5   Bowels Score 10   Toilet Use Score 5   Transfers (Bed/Chair) Score 10   Mobility (Level Surface) Score 10   Stairs Score 0   Barthel Index Score 60   End of Consult   Patient Position at End of Consult Bedside chair;Bed/Chair alarm activated; All needs within reach     The patient's AM-PAC Basic Mobility Inpatient Short Form Raw Score is 17. A Raw score of greater than 16 suggests the patient may benefit from discharge to home. Please also refer to the recommendation of the Physical Therapist for safe discharge planning. Comfortable Gait Speed: 0.41 m/s  Gait Speed Interpretation:  Gain of 0.1 m/s is a predictor of well-being in those w/ abnormal walking speed compared to age matched peers  <0.7m/s is at an increased risk for falls    Household ambulator: <0.4 m/s  Limited community ambulator: 0.4-0.8 m/s  [employer] ambulator: 0.8-1.2 m/s  Able to safely cross streets: >1.2 m/s    Skilled PT recommended while in hospital and upon DC to progress pt toward treatment goals.      Zander Walker, PT

## 2023-11-09 NOTE — ASSESSMENT & PLAN NOTE
notes confusion for "a while" but recently worse over last 1-2 months. D/w neurology MOCA score outpt 7/30  Recommend outpt f/u with neurology, can send referral for geriatrics as well.   Likely baseline confusion worsened due to recent stroke/UTI hx/hospitalizations recently   Outpt f/u with neurology

## 2023-11-09 NOTE — DISCHARGE INSTR - AVS FIRST PAGE
Follow up with nephrology, neurology, and primary care on discharge. Keep a log of your blood pressures.

## 2023-11-09 NOTE — ASSESSMENT & PLAN NOTE
POA with elevated BP.   Recent stroke  Neurology consult appreciated, ordered magnesium and tylenol (pt refused tylenol)   Improving

## 2023-11-09 NOTE — ASSESSMENT & PLAN NOTE
Patient presents with complaints of elevated blood pressure and headache, did not receive any medication in the emergency department. Per ED physician patient felt to be somewhat confused while she was in the emergency department-and hence requested for admission. Patient recently had stroke, discharged on 10/30/2023. CT head negative on admission   Neurology input appreciated with recent CVA. Allowed permissive HTN on admission though repeat MRI brain negative, will resume BP meds and monitor   Only takes torsemide PRN for weight gain   Continue metoprolol. BP is improved today.   Advised pt and  to keep close log of BP at home and f/u outpt for further adjustment PRN

## 2023-11-09 NOTE — PLAN OF CARE
Problem: PHYSICAL THERAPY ADULT  Goal: Performs mobility at highest level of function for planned discharge setting. See evaluation for individualized goals. Description: Treatment/Interventions: Functional transfer training, LE strengthening/ROM, Therapeutic exercise, Endurance training, Cognitive reorientation, Patient/family training, Equipment eval/education, Bed mobility, Gait training          See flowsheet documentation for full assessment, interventions and recommendations. Note:    Problem List: Decreased strength, Decreased endurance, Impaired balance, Decreased mobility, Decreased cognition, Decreased safety awareness  Assessment: Pt presents with complaints of elevated blood pressure, headache. Dx: headache, hypertensive urgency, DM, cognitive impairment, CKD, and UTI. order placed for PT eval and tx. pt presents w/ comorbidities of ambulatory dysfunction, arthritis, aspiration, CKD, chronic pain disorder, DM, hyperlipidemia, HTN, obesity, left great toe fusion, UTI, and orbital wall fx and personal factors of advanced age, decreased cognition, positive fall history, depression, and inability to perform IADLs. pt presents w/ weakness, decreased endurance, impaired cognition, impaired balance, gait deviations, decreased safety awareness, and fall risk. these impairments are evident in findings from physical examination (weakness), mobility assessment (need for standby assist w/ all phases of mobility when usually mobilizing independently, tolerance to only 140 feet of ambulation, and need for cueing for mobility technique), and Barthel Index: 60/100 and Comfortable Gait Speed: 0.41 m/s (less than 1.0 m/s indicates need for intervention to address falls risk). pt needed input for task focus and mobility technique/safety. pt is at risk for falls due to physical and safety awareness deficits.  pt's clinical presentation is unstable/unpredictable (evident in need for assist w/ all phases of mobility when usually mobilizing independently, tolerance to only 120 feet of ambulation, and need for input for task focus and mobility technique). pt needs inpatient PT tx to improve mobility deficits and progress mobility training as appropriate. Gerontology consult would benefit pt to address cognition and aging related issues. Rehab Resource Intensity Level, PT: III (Minimum Resource Intensity)    See flowsheet documentation for full assessment.

## 2023-11-09 NOTE — PLAN OF CARE
Problem: Potential for Falls  Goal: Patient will remain free of falls  Description: INTERVENTIONS:  - Educate patient/family on patient safety including physical limitations  - Instruct patient to call for assistance with activity   - Consult OT/PT to assist with strengthening/mobility   - Keep Call bell within reach  - Keep bed low and locked with side rails adjusted as appropriate  - Keep care items and personal belongings within reach  - Initiate and maintain comfort rounds  - Make Fall Risk Sign visible to staff  - Offer Toileting every 2 Hours, in advance of need  - Initiate/Maintain alarm  - Obtain necessary fall risk management equipment:   - Apply yellow socks and bracelet for high fall risk patients  - Consider moving patient to room near nurses station  Outcome: Progressing     Problem: NEUROSENSORY - ADULT  Goal: Achieves stable or improved neurological status  Description: INTERVENTIONS  - Monitor and report changes in neurological status  - Monitor vital signs such as temperature, blood pressure, glucose, and any other labs ordered   - Initiate measures to prevent increased intracranial pressure  - Monitor for seizure activity and implement precautions if appropriate      Outcome: Progressing  Goal: Remains free of injury related to seizures activity  Description: INTERVENTIONS  - Maintain airway, patient safety  and administer oxygen as ordered  - Monitor patient for seizure activity, document and report duration and description of seizure to physician/advanced practitioner  - If seizure occurs,  ensure patient safety during seizure  - Reorient patient post seizure  - Seizure pads on all 4 side rails  - Instruct patient/family to notify RN of any seizure activity including if an aura is experienced  - Instruct patient/family to call for assistance with activity based on nursing assessment  - Administer anti-seizure medications if ordered    Outcome: Progressing  Goal: Achieves maximal functionality and self care  Description: INTERVENTIONS  - Monitor swallowing and airway patency with patient fatigue and changes in neurological status  - Encourage and assist patient to increase activity and self care.    - Encourage visually impaired, hearing impaired and aphasic patients to use assistive/communication devices  Outcome: Progressing     Problem: CARDIOVASCULAR - ADULT  Goal: Maintains optimal cardiac output and hemodynamic stability  Description: INTERVENTIONS:  - Monitor I/O, vital signs and rhythm  - Monitor for S/S and trends of decreased cardiac output  - Administer and titrate ordered vasoactive medications to optimize hemodynamic stability  - Assess quality of pulses, skin color and temperature  - Assess for signs of decreased coronary artery perfusion  - Instruct patient to report change in severity of symptoms  Outcome: Progressing  Goal: Absence of cardiac dysrhythmias or at baseline rhythm  Description: INTERVENTIONS:  - Continuous cardiac monitoring, vital signs, obtain 12 lead EKG if ordered  - Administer antiarrhythmic and heart rate control medications as ordered  - Monitor electrolytes and administer replacement therapy as ordered  Outcome: Progressing     Problem: MOBILITY - ADULT  Goal: Maintain or return to baseline ADL function  Description: INTERVENTIONS:  -  Assess patient's ability to carry out ADLs; assess patient's baseline for ADL function and identify physical deficits which impact ability to perform ADLs (bathing, care of mouth/teeth, toileting, grooming, dressing, etc.)  - Assess/evaluate cause of self-care deficits   - Assess range of motion  - Assess patient's mobility; develop plan if impaired  - Assess patient's need for assistive devices and provide as appropriate  - Encourage maximum independence but intervene and supervise when necessary  - Involve family in performance of ADLs  - Assess for home care needs following discharge   - Consider OT consult to assist with ADL evaluation and planning for discharge  - Provide patient education as appropriate  Outcome: Progressing  Goal: Maintains/Returns to pre admission functional level  Description: INTERVENTIONS:  - Perform BMAT or MOVE assessment daily.   - Set and communicate daily mobility goal to care team and patient/family/caregiver. - Collaborate with rehabilitation services on mobility goals if consulted  - Perform Range of Motion 2 times a day. - Reposition patient every 2 hours.   - Dangle patient 2 times a day  - Stand patient 2 times a day  - Ambulate patient 2 times a day  - Out of bed to chair 2 times a day   - Out of bed for meals 2 times a day  - Out of bed for toileting  - Record patient progress and toleration of activity level   Outcome: Progressing

## 2023-11-09 NOTE — OCCUPATIONAL THERAPY NOTE
Occupational Therapy Evaluation      Марина Davis    11/9/2023    Principal Problem:    Hypertensive urgency  Active Problems:    COPD (chronic obstructive pulmonary disease) (HCC)    Headache    Stroke (HCC)    HLD (hyperlipidemia)    SHAYAN (obstructive sleep apnea)    History of recurrent UTIs    CKD (chronic kidney disease) stage 4, GFR 15-29 ml/min ([facility])    Type 2 diabetes mellitus ([address])    Cognitive impairment      Past Medical History:   Diagnosis Date    Actinic keratosis     Acute cystitis without hematuria 04/28/2023    Acute cystitis without hematuria 04/28/2023    Acute-on-chronic kidney injury      NIKOS (acute kidney injury) ([address]) 05/08/2020    Allergic     Allergic rhinitis     Ambulatory dysfunction 10/22/2020    AMS (altered mental status) 07/14/2020    Anesthesia     pt reports "had to use double lumen endo tube for hiatal hernia repair/so surgeon could get to where he needed to work"    Arthritis     Aspiration into airway     Michael esophagus 1993    Lot of stress with children    Basal cell carcinoma 2007    left cheek     BCC (basal cell carcinoma) 05/27/2021    Left Nasal tip    Breast discharge 06/19/2023    Breast pain 06/19/2023    Cancer ([address])     squamous cell cancer on forhead    Cancer ([address])     basal cell on nose     Cholelithiasis     Chronic kidney disease 2000, 2018    Stones, kidney disease stage 4    Chronic pain disorder     bilat feet and joint pain on occas    Colon polyp     Constipation     COPD (chronic obstructive pulmonary disease) ([address])     COVID-19 08/2021    recovered at home/did receive monoclonal infusion    COVID-19 virus infection 08/16/2021    Dental bridge present     Depression     Diabetes mellitus ([address])     Type 2    Diabetic neuropathy ([address])     Difficulty walking 2017    Disease of thyroid gland     Dyspnea     Elevated liver enzymes 04/04/2023    Epigastric abdominal pain with severe diabetic gastroparesis, status post EGD/Botox injection, 5/14 05/17/2021 Facial abrasion, initial encounter 03/22/2023    Fall 03/22/2023    Family history of thyroid problem     Fatty liver     Fibromyalgia, primary     Fracture of orbital floor, blow-out, right, closed, with routine healing, subsequent encounter 03/22/2023    GERD (gastroesophageal reflux disease)     Heart burn     Hiatal hernia     History of colon polyps 07/30/2021    History of kidney stones 09/29/2020    Hx of recurrent kidney stones    History of kidney stones 09/29/2020    Hx of recurrent kidney stones  Formatting of this note might be different from the original. Hx of recurrent kidney stones  Last Assessment & Plan:  Formatting of this note might be different from the original. We will set her up for follow-up in 3 months with a KUB prior. She knows to call if she has any kidney stone type pain in the meantime.     History of pneumonia     History of repair of hiatal hernia 05/03/2020    Hyperlipidemia     Hyperplasia, parathyroid ([address])     Hypertension     Hyponatremia 04/26/2023    Hypotension 04/13/2022    Kidney problem     Kidney stone     Leukocytosis 07/14/2020    Memory loss Julu2 2020    Motion sickness     Motion sickness     Multiple closed fractures of ribs of right side 03/22/2023    Nasal bones, closed fracture 03/22/2023    Neck pain     Obesity 1978    Obesity (BMI 30.0-34.9)     Other chest pain 09/13/2021    Peripheral neuropathy     Pollen allergies     RLS (restless legs syndrome)     S/P foot surgery 07/20/2022    SCC (squamous cell carcinoma) 05/04/2021    left mid forehead    SCC (squamous cell carcinoma) 2023    chest    Seasonal allergies     Shingles 01 05 23    Sleep apnea     has inspire    Squamous cell skin cancer 2007    left cheek     Swollen ankles     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, left     great toe fusion    Urinary incontinence     Urinary tract infection 03/28/2022    Wears glasses        Past Surgical History:   Procedure Laterality Date    ABDOMINAL SURGERY 3884,[id number],[id number]    Nissen x2 1972 tubal ligarion    ARTHROSCOPY KNEE      BREAST BIOPSY      stereotactic-benign    BREAST BIOPSY      stereo-benign    BREAST CYST EXCISION Bilateral     benign    BREAST EXCISIONAL BIOPSY      unknown date-benign    BREAST EXCISIONAL BIOPSY      unknown date-benign    BREAST EXCISIONAL BIOPSY      unknown date-benign    BREAST EXCISIONAL BIOPSY      unknown age-benign    CARDIAC CATHETERIZATION      CHOLECYSTECTOMY      COLONOSCOPY      EXAMINATION UNDER ANESTHESIA N/A 06/24/2021    Procedure: EXAM UNDER ANESTHESIA (EUA), DISE;  Surgeon: Anila Masterson MD;  [facility];  Service: ENT    HERNIA REPAIR      HIATAL HERNIA REPAIR      KNEE SURGERY      Torn maniscus lap surg    LIPOSUCTION      LYMPH NODE BIOPSY      MOHS SURGERY  05/20/2021    left mid forehead-Mickey    MOHS SURGERY Left 05/27/2021    Left nasal tip- mickey     IL LIGATION/BIOPSY TEMPORAL ARTERY Left 01/05/2023    Procedure: BIOPSY ARTERY TEMPORAL;  Surgeon: Leonel Santiago DO;  [facility];  Service: Vascular    IL OPEN IMPLANTATION CRANIAL NERVE BOOM & PULSE GEN N/A 11/10/2021    Procedure: INSERTION UPPER AIRWAY STIMULATOR, INSPIRE IMPLANT;  Surgeon: Anila Masterson MD;  [facility];  Service: ENT    IL UNLISTED PROCEDURE FOOT/TOES Right 07/19/2022    Procedure: 1st metatarsal phalangeal joint fusion;  Surgeon: Raul Aragon DPM;  [facility];  Service: [address] [place] [address] Road Bilateral 2000    REDUCTION MAMMAPLASTY      SKIN BIOPSY      SKIN CANCER EXCISION  2007    squamous cell carcinoma     SKIN CANCER EXCISION  2007    basal cell carcinoma    SKIN CANCER EXCISION  2023    SCCIS chest    SQUAMOUS CELL CARCINOMA EXCISION      TOE SURGERY Right 08/04/2022    Right Great Toe Fusion    TONSILLECTOMY      TUBAL LIGATION      UPPER GASTROINTESTINAL ENDOSCOPY      US GUIDED BREAST BIOPSY RIGHT COMPLETE Right 07/18/2022 11/09/23 1038   OT Last Visit   OT Visit Date 11/09/23   Note Type   Note type Evaluation   Additional Comments Pt presents seated in chair, agreeable to OT evaluation. At the end of session, pt returned to chair with needs/call bell in reach. Pain Assessment   Pain Assessment Tool 0-10   Pain Score No Pain   Restrictions/Precautions   Other Precautions Fall Risk;Cognitive; Chair Alarm; Bed Alarm   Home Living   Type of Home House  (1 small LEYLA)   Home Layout Able to live on main level with bedroom/bathroom   Bathroom Shower/Tub Walk-in shower   Bathroom Toilet Raised   Bathroom Equipment Grab bars in [address] Cane;Walker   Prior Function   Level of [place] Needs assistance with ADLs; Needs assistance with IADLS   Lives With Spouse   Receives Help From Family   IADLs Family/Friend/Other provides transportation; Family/Friend/Other provides meals; Family/Friend/Other provides medication management   Falls in the last 6 months 5 to 10  (6. Reports falls mostly happened on uneven terrain when going fishing)   Vocational Retired  (respiratory therapist)   Lifestyle   Autonomy PTA, pt reports occasionally can manage ADls however spouse provides cognitive supports because "sometimes I have 3 bras on and I am not thinking right." Pt reports feeling sad over 'independence being taken away"   Reciprocal Relationships Spouse provides cognitive support and occasional physical assist with ADLs. Assistance with bathing for pt safety. Intrinsic Gratification fishing, sewing, quilting. General   Family/Caregiver Present Yes  (spouse)   Subjective   Subjective "I just wish I could drive. But I know I can't.  Sometimes my brain just is not working and I don't understand it."   ADL   Eating Assistance 7  Independent   Grooming Assistance 5  Supervision/Setup   Grooming Deficit Verbal cueing   UB Bathing Assistance 5  [id number] [address] 5  Supervision/Setup   UB Dressing Deficit Verbal cueing   LB Dressing Assistance 5  Supervision/Setup   LB Dressing Deficit Verbal cueing   Toileting Assistance  5  Supervision/Setup   Toileting Deficit Verbal cueing   Additional Comments cognitive support cues occasionally t/o session. . Did not observe UB bathing and LB bathing at time of evaluation, with use of clinical reasoning, pt's performance throughout evaluation indicates that pt may be able to perform these tasks at the levels listed above   Bed Mobility   Additional Comments unable to assess; pt presents OOB in chair upon OT arrival   Transfers   Sit to Stand 5  Supervision   Additional items Armrests   Stand to Sit 5  Supervision   Toilet transfer 5  Supervision   Functional Mobility   Functional Mobility 5  Supervision   Additional Comments use of RW to ambulate household distances in room. Balance   Static Sitting Good   Dynamic Sitting Good   Static Standing Fair +   Dynamic Standing Fair   Activity Tolerance   Activity Tolerance Patient tolerated treatment well   Nurse Made Aware RN cleared pt for OT evaluation and aware of pt's tolerance of session. RUE Assessment   RUE Assessment WFL   LUE Assessment   LUE Assessment WFL   Hand Function   Gross Motor Coordination Functional   Fine Motor Coordination Functional   Sensation   Light Touch No apparent deficits   Vision - Complex Assessment   Acuity Able to read employee name badge without difficulty   Psychosocial   Psychosocial (WDL) WDL   Cognition   Overall Cognitive Status Impaired   Arousal/Participation Alert; Cooperative   Attention Attends with cues to redirect   Orientation Level Oriented to person;Oriented to situation   Memory Decreased recall of precautions;Decreased recall of recent events;Decreased short term memory   Following Commands Follows one step commands with increased time or repetition   Comments Pt verified ID by stating name. Wrist ID bracelet also used as a second means of identification.    Cognition Assessment Tools (S) (NOTED ORDER FOR COGNITIVE EVALUATION. Pt AND SPOUSE REPORT PT CURRENTLY GOING TO OUTPT OT FOR COGNITIVE DEFICITS. AT TIME OF EVALUATION, Pt AND SPOUSE BOTH FEEL COGNITION IS BACK TO MOST RECENT BASELINE. Pt INTENDS TO RESUME OUTPT COGNITIVE THERAPY.)   Assessment   Limitation Decreased Safe judgement during ADL;Decreased cognition;Decreased high-level ADLs  (impaired balance, fxnl mobility, fxnl reach, attention to task, direction following, communication, safety awareness, insight, sequencing, orientation, problem solving, learning new tasks,  speech(noted occasional word finding deficits))   Prognosis Good   Assessment Pt is a 75 y.o. female seen for OT evaluation s/p admission to [address] [place] [address] on 11/7/2023  due to headache and inc'd confusion. Pt diagnosis: Hypertensive urgency. Pt with recent CVA. Pt's PMH impacting occupational performance is listed above. Pt's PLOF also listed in above flowsheet. Pt receives assistance at baseline for ADLs due to cognitive deficits, but performs transfers and functional mobility at mod (I)/(I) level. Pt is motivated to return to home with spouse. Personal and environmental factors supporting pt at time of IE include supportive spouse, attitude towards recovery, and accessible home environment. During evaluation pt performed as is outlined above in flowsheet. Standardized assessments used to assist in identifying performance deficits include AMPAC 6-Clicks. Pt's raw score on the AM-PAC Daily activity inpatient short form is 19, standardized score is 40.22, which is greater than 39.4. Patients at this level are likely to benefit from DC to home. Performance deficits that affect the pt’s occupational performance can be seen above. Due to pt's current functional limitations and medical complications, pt is functioning below baseline level of independence.  Pt would benefit from continued skilled OT treatment in order to maximize safety, independence and overall performance with ADLs, functional transfers, and cognition in order to achieve highest level of function. Based on functional performance deficits and assessments, this evaluation is identified as a high complexity evaluation. Goals   Patient Goals to go home   LTG Time Frame 7-10   Long Term Goal #1 see goals listed below   Plan   Treatment Interventions ADL retraining;Functional transfer training;Cognitive reorientation;Patient/family training; Compensatory technique education;Equipment evaluation/education; Activityengagement   Goal Expiration Date 11/19/23   OT Treatment Day 1   OT Frequency 2-3x/wk   Discharge Recommendation   Rehab Resource Intensity Level, OT III (Minimum Resource Intensity)   Equipment Recommended Shower/Tub chair with back ($)  (RECOMMEND PT TO LOOK IN TO HAND HELD SHOWER)   AM-PAC Daily Activity Inpatient   Lower Body Dressing 3   Bathing 3   Toileting 3   Upper Body Dressing 3   Grooming 3   Eating 4   Daily Activity Raw Score 19   Daily Activity Standardized Score (Calc for Raw Score >=11) 40.22   AM-PAC Applied Cognition Inpatient   Following a Speech/Presentation 2   Understanding Ordinary Conversation 3   Taking Medications 2   Remembering Where Things Are Placed or Put Away 2   Remembering List of 4-5 Errands 1   Taking Care of Complicated Tasks 2   Applied Cognition Raw Score 12   Applied Cognition Standardized Score 28.82   Additional Treatment Session   Start Time 1100   End Time 1112   Treatment Assessment Pt seen at [facility] for OT evaluation and tx session. Tx session consisting of education re: safe transfer techniques, AE/DME education  that would be beneficial upon d/c. Discussion re: benefits of shower chair for safety as well as hand held shower. Discussed where to obtain. Pt with (+) fall history and observed reaching well outside of RAJANI to retrieve shoes from floor.  Shoes were noted to be just out of reach- discussed risks involved with this as pt supporting self on unstable/moving table. Pt verbalized understanding, however anticipate need for cognitive supports/reminders of this. Suggestion for pt and spouse to obtain a reacher to retrieve out of reach items in a safer manner. Both verbalized understanding. Anticipate pt to d/c later today, however should pt remain in hospital, recommend OT to continue to follow up to address goals and deficits listed in evaluation. End of Consult   Education Provided Yes;Family or social support of family present for education by provider   Patient Position at End of Consult All needs within reach;Bed/Chair alarm activated; Bedside chair   Nurse Communication Nurse aware of consult       GOALS:    Pt will achieve the following within specified time frame:  *Perform ADL transfers at mod (I) level for inc'd independence with ADLs/purposeful tasks    *Perform UB ADL at mod (I) level for inc'd independence with self cares    *Perform LB ADL at (S) level using AE prn for inc'd independence with self cares    *Increase static stand balance to Good and dyn stand balance to Fair+ for inc'd safety with standing purposeful tasks    *Perform clothing management/hygiene for toileting at distant (S)/mod (I) level for inc'd independence with self cares    *Participate in further cognitive testing to assist with safe d/c planning    *Perform sinkside G&H with distant (S), with good safety and Fair+ balance for inc'd independence with self cares    *Pt will follow 3 step verbal instructions to increase overall performance with daily activities    *Pt will participate in memory activities and recall 3/5 items to increase performance with ADL/IADL tasks.      *Pt will verbalize 2-3 fall prevention techniques to utilize in order to complete purposeful/ADL/IADL tasks safely in order to decrease risk of falls      Thank you  Malka Bhakta

## 2023-11-09 NOTE — PLAN OF CARE
Problem: Potential for Falls  Goal: Patient will remain free of falls  Description: INTERVENTIONS:  - Educate patient/family on patient safety including physical limitations  - Instruct patient to call for assistance with activity   - Consult OT/PT to assist with strengthening/mobility   - Keep Call bell within reach  - Keep bed low and locked with side rails adjusted as appropriate  - Keep care items and personal belongings within reach  - Initiate and maintain comfort rounds  - Make Fall Risk Sign visible to staff  - Offer Toileting every 2 Hours, in advance of need  - Initiate/Maintain 2alarm  - Obtain necessary fall risk management equipment: 2  - Apply yellow socks and bracelet for high fall risk patients  - Consider moving patient to room near nurses station  11/9/2023 1517 by Yobany Story RN  Outcome: Adequate for Discharge  11/9/2023 0940 by Yobany Story RN  Outcome: Progressing     Problem: NEUROSENSORY - ADULT  Goal: Achieves stable or improved neurological status  Description: INTERVENTIONS  - Monitor and report changes in neurological status  - Monitor vital signs such as temperature, blood pressure, glucose, and any other labs ordered   - Initiate measures to prevent increased intracranial pressure  - Monitor for seizure activity and implement precautions if appropriate      11/9/2023 1517 by Yobany Story RN  Outcome: Adequate for Discharge  11/9/2023 0940 by Yobany Story RN  Outcome: Progressing  Goal: Remains free of injury related to seizures activity  Description: INTERVENTIONS  - Maintain airway, patient safety  and administer oxygen as ordered  - Monitor patient for seizure activity, document and report duration and description of seizure to physician/advanced practitioner  - If seizure occurs,  ensure patient safety during seizure  - Reorient patient post seizure  - Seizure pads on all 4 side rails  - Instruct patient/family to notify RN of any seizure activity including if an aura is experienced  - Instruct patient/family to call for assistance with activity based on nursing assessment  - Administer anti-seizure medications if ordered    11/9/2023 1517 by Gary Araujo RN  Outcome: Adequate for Discharge  11/9/2023 0940 by Gary Araujo RN  Outcome: Progressing  Goal: Achieves maximal functionality and self care  Description: INTERVENTIONS  - Monitor swallowing and airway patency with patient fatigue and changes in neurological status  - Encourage and assist patient to increase activity and self care.    - Encourage visually impaired, hearing impaired and aphasic patients to use assistive/communication devices  11/9/2023 1517 by Gary Araujo RN  Outcome: Adequate for Discharge  11/9/2023 0940 by Gary Araujo RN  Outcome: Progressing     Problem: CARDIOVASCULAR - ADULT  Goal: Maintains optimal cardiac output and hemodynamic stability  Description: INTERVENTIONS:  - Monitor I/O, vital signs and rhythm  - Monitor for S/S and trends of decreased cardiac output  - Administer and titrate ordered vasoactive medications to optimize hemodynamic stability  - Assess quality of pulses, skin color and temperature  - Assess for signs of decreased coronary artery perfusion  - Instruct patient to report change in severity of symptoms  11/9/2023 1517 by Gary Araujo RN  Outcome: Adequate for Discharge  11/9/2023 0940 by Gary Araujo RN  Outcome: Progressing  Goal: Absence of cardiac dysrhythmias or at baseline rhythm  Description: INTERVENTIONS:  - Continuous cardiac monitoring, vital signs, obtain 12 lead EKG if ordered  - Administer antiarrhythmic and heart rate control medications as ordered  - Monitor electrolytes and administer replacement therapy as ordered  11/9/2023 1517 by Gary Araujo RN  Outcome: Adequate for Discharge  11/9/2023 0940 by Gary Araujo RN  Outcome: Progressing     Problem: MOBILITY - ADULT  Goal: Maintain or return to baseline ADL function  Description: INTERVENTIONS:  -  Assess patient's ability to carry out ADLs; assess patient's baseline for ADL function and identify physical deficits which impact ability to perform ADLs (bathing, care of mouth/teeth, toileting, grooming, dressing, etc.)  - Assess/evaluate cause of self-care deficits   - Assess range of motion  - Assess patient's mobility; develop plan if impaired  - Assess patient's need for assistive devices and provide as appropriate  - Encourage maximum independence but intervene and supervise when necessary  - Involve family in performance of ADLs  - Assess for home care needs following discharge   - Consider OT consult to assist with ADL evaluation and planning for discharge  - Provide patient education as appropriate  11/9/2023 1517 by Christoper Mohs, RN  Outcome: Adequate for Discharge  11/9/2023 0940 by Christoper Mohs, RN  Outcome: Progressing  Goal: Maintains/Returns to pre admission functional level  Description: INTERVENTIONS:  - Perform BMAT or MOVE assessment daily.   - Set and communicate daily mobility goal to care team and patient/family/caregiver. - Collaborate with rehabilitation services on mobility goals if consulted  - Perform Range of Motion 2 times a day. - Reposition patient every 2 hours. - Dangle patient 2 times a day  - Stand patient 2 times a day  - Ambulate patient 2 times a day  - Out of bed to chair 2 times a day   - Out of bed for meals 2 times a day  - Out of bed for toileting  - Record patient progress and toleration of activity level   11/9/2023 1517 by Christoper Mohs, RN  Outcome: Adequate for Discharge  11/9/2023 0940 by Christoper Mohs, RN  Outcome: Progressing     Problem: OCCUPATIONAL THERAPY ADULT  Goal: Performs self-care activities at highest level of function for planned discharge setting. See evaluation for individualized goals.   Description: Treatment Interventions: ADL retraining, Functional transfer training, Cognitive reorientation, Patient/family training, Compensatory technique education, Equipment evaluation/education, Activityengagement  Equipment Recommended: Shower/Tub chair with back ($) (RECOMMEND PT TO LOOK IN TO HAND HELD SHOWER)       See flowsheet documentation for full assessment, interventions and recommendations. Outcome: Adequate for Discharge     Problem: PHYSICAL THERAPY ADULT  Goal: Performs mobility at highest level of function for planned discharge setting. See evaluation for individualized goals. Description: Treatment/Interventions: Functional transfer training, LE strengthening/ROM, Therapeutic exercise, Endurance training, Cognitive reorientation, Patient/family training, Equipment eval/education, Bed mobility, Gait training          See flowsheet documentation for full assessment, interventions and recommendations.   Outcome: Adequate for Discharge

## 2023-11-09 NOTE — ASSESSMENT & PLAN NOTE
Lab Results   Component Value Date    HGBA1C 7.1 (H) 10/28/2023       Recent Labs     11/08/23  1653 11/08/23  2110 11/09/23  0747 11/09/23  1046   POCGLU 188* 155* 126 184*       Blood Sugar Average: Last 72 hrs:  (P) 173.4Home regimen: Levemir 50 units QHS, novolog 18/18/25   She was not ordered any insulin on admission. Will order levemir 30 units QHS, humalog 5 units TID, SSI, and accu checks. Monitor PRN.    Can resume home RX on discharge

## 2023-11-09 NOTE — ASSESSMENT & PLAN NOTE
Lab Results   Component Value Date    EGFR 18 11/09/2023    EGFR 19 11/07/2023    EGFR 19 11/01/2023    CREATININE 2.49 (H) 11/09/2023    CREATININE 2.37 (H) 11/07/2023    CREATININE 2.42 (H) 11/01/2023   Renal function at baseline. Monitor.

## 2023-11-10 ENCOUNTER — TRANSITIONAL CARE MANAGEMENT (OUTPATIENT)
Dept: INTERNAL MEDICINE CLINIC | Facility: CLINIC | Age: 73
End: 2023-11-10

## 2023-11-13 ENCOUNTER — OFFICE VISIT (OUTPATIENT)
Dept: INTERNAL MEDICINE CLINIC | Facility: CLINIC | Age: 73
End: 2023-11-13
Payer: MEDICARE

## 2023-11-13 ENCOUNTER — TELEPHONE (OUTPATIENT)
Dept: PSYCHIATRY | Facility: CLINIC | Age: 73
End: 2023-11-13

## 2023-11-13 VITALS
SYSTOLIC BLOOD PRESSURE: 162 MMHG | DIASTOLIC BLOOD PRESSURE: 80 MMHG | HEART RATE: 62 BPM | WEIGHT: 168 LBS | TEMPERATURE: 97.8 F | RESPIRATION RATE: 16 BRPM | BODY MASS INDEX: 28.84 KG/M2 | OXYGEN SATURATION: 96 %

## 2023-11-13 DIAGNOSIS — I12.9 PARENCHYMAL RENAL HYPERTENSION, STAGE 1 THROUGH STAGE 4 OR UNSPECIFIED CHRONIC KIDNEY DISEASE: ICD-10-CM

## 2023-11-13 DIAGNOSIS — I63.9 CEREBROVASCULAR ACCIDENT (CVA), UNSPECIFIED MECHANISM (HCC): ICD-10-CM

## 2023-11-13 DIAGNOSIS — E53.8 B12 NEUROPATHY (HCC): ICD-10-CM

## 2023-11-13 DIAGNOSIS — N18.4 STAGE 4 CHRONIC KIDNEY DISEASE (HCC): ICD-10-CM

## 2023-11-13 DIAGNOSIS — Z76.89 ENCOUNTER FOR SUPPORT AND COORDINATION OF TRANSITION OF CARE: Primary | ICD-10-CM

## 2023-11-13 DIAGNOSIS — G63 B12 NEUROPATHY (HCC): ICD-10-CM

## 2023-11-13 DIAGNOSIS — R80.1 PERSISTENT PROTEINURIA: ICD-10-CM

## 2023-11-13 DIAGNOSIS — E11.21 DIABETIC NEPHROPATHY ASSOCIATED WITH TYPE 2 DIABETES MELLITUS (HCC): ICD-10-CM

## 2023-11-13 DIAGNOSIS — I10 ESSENTIAL HYPERTENSION: ICD-10-CM

## 2023-11-13 DIAGNOSIS — B37.31 VAGINAL YEAST INFECTION: ICD-10-CM

## 2023-11-13 DIAGNOSIS — E53.8 B12 DEFICIENCY: ICD-10-CM

## 2023-11-13 PROBLEM — M25.552 LEFT HIP PAIN: Status: RESOLVED | Noted: 2023-10-05 | Resolved: 2023-11-13

## 2023-11-13 PROBLEM — R53.83 OTHER FATIGUE: Status: RESOLVED | Noted: 2023-09-21 | Resolved: 2023-11-13

## 2023-11-13 PROBLEM — I16.0 HYPERTENSIVE URGENCY: Status: RESOLVED | Noted: 2023-10-27 | Resolved: 2023-11-13

## 2023-11-13 PROBLEM — R41.0 CONFUSION: Status: RESOLVED | Noted: 2023-10-30 | Resolved: 2023-11-13

## 2023-11-13 PROCEDURE — 99496 TRANSJ CARE MGMT HIGH F2F 7D: CPT | Performed by: INTERNAL MEDICINE

## 2023-11-13 PROCEDURE — 96372 THER/PROPH/DIAG INJ SC/IM: CPT | Performed by: INTERNAL MEDICINE

## 2023-11-13 RX ORDER — CYANOCOBALAMIN 1000 UG/ML
1000 INJECTION, SOLUTION INTRAMUSCULAR; SUBCUTANEOUS WEEKLY
Status: SHIPPED | OUTPATIENT
Start: 2023-11-13

## 2023-11-13 RX ORDER — FLUCONAZOLE 150 MG/1
150 TABLET ORAL ONCE
Qty: 1 TABLET | Refills: 0 | Status: SHIPPED | OUTPATIENT
Start: 2023-11-13 | End: 2023-11-13

## 2023-11-13 RX ORDER — LOSARTAN POTASSIUM 50 MG/1
50 TABLET ORAL 2 TIMES DAILY
Qty: 180 TABLET | Refills: 0 | Status: SHIPPED | OUTPATIENT
Start: 2023-11-13

## 2023-11-13 RX ADMIN — CYANOCOBALAMIN 1000 MCG: 1000 INJECTION, SOLUTION INTRAMUSCULAR; SUBCUTANEOUS at 10:00

## 2023-11-13 NOTE — ASSESSMENT & PLAN NOTE
Lab Results   Component Value Date    HGBA1C 7.1 (H) 10/28/2023   Continue Levemir 15 units once a day, NovoLog will reduce night dose from 25 to 18 units

## 2023-11-13 NOTE — TELEPHONE ENCOUNTER
Spouse of patient called in to inform the office an in network neuropsychology referral was placed into the patients chart. Writer called and spoke with spouse informing him the neuropsychology  will contact them for further assistance.

## 2023-11-13 NOTE — ASSESSMENT & PLAN NOTE
Will optimize blood pressure control to goal of less than 130/80, will increase losartan from 25 twice daily to 50 mg twice daily continue home blood pressure monitoring and follow-up in 1 week. Continue Norvasc 5 mg and metoprolol 50 mg twice daily.

## 2023-11-13 NOTE — ASSESSMENT & PLAN NOTE
Low B12 with high methylmalonic acid, she was on B12 monthly will change to once a week for at least 4 weeks and reevaluate in 1 month.

## 2023-11-13 NOTE — PROGRESS NOTES
Assessment & Plan     1. Diabetic nephropathy associated with type 2 diabetes mellitus ([address])  Assessment & Plan:    Lab Results   Component Value Date    HGBA1C 7.1 (H) 10/28/2023   Continue Levemir 15 units once a day, NovoLog will reduce night dose from 25 to 18 units      2. Cerebrovascular accident (CVA), unspecified mechanism ([address])  Assessment & Plan:  Continue statin, aspirin and Plavix until November 20 and then aspirin lifelong. Follow-up with neurology as scheduled. 3. Essential hypertension  Assessment & Plan: Will optimize blood pressure control to goal of less than 130/80, will increase losartan from 25 twice daily to 50 mg twice daily continue home blood pressure monitoring and follow-up in 1 week. Continue Norvasc 5 mg and metoprolol 50 mg twice daily. Orders:  -     losartan (COZAAR) 50 mg tablet; Take 1 tablet (50 mg total) by mouth 2 (two) times a day    4. B12 neuropathy (HCC)  Assessment & Plan:  Low B12 with high methylmalonic acid, she was on B12 monthly will change to once a week for at least 4 weeks and reevaluate in 1 month. Orders:  -     cyanocobalamin injection 1,000 mcg    5. B12 deficiency  -     cyanocobalamin injection 1,000 mcg    6. Persistent proteinuria  -     losartan (COZAAR) 50 mg tablet; Take 1 tablet (50 mg total) by mouth 2 (two) times a day    7. Stage 4 chronic kidney disease (HCC)  -     losartan (COZAAR) 50 mg tablet; Take 1 tablet (50 mg total) by mouth 2 (two) times a day    8. Parenchymal renal hypertension, stage 1 through stage 4 or unspecified chronic kidney disease  -     losartan (COZAAR) 50 mg tablet; Take 1 tablet (50 mg total) by mouth 2 (two) times a day    9. Vaginal yeast infection  -     fluconazole (DIFLUCAN) 150 mg tablet; Take 1 tablet (150 mg total) by mouth once for 1 dose         Subjective     Transitional Care Management Review:   Mónica Bermeo is a 75 y.o. female here for TCM follow up.      During the TCM phone call patient stated:  TCM Call       Date and time call was made  11/10/2023 12:05 PM    Hospital care reviewed  Records reviewed    Patient was hospitialized at  [facility]    Date of Admission  11/07/23    Date of discharge  11/09/23    Diagnosis  Hypertension    Disposition  Home    Were the patients medications reviewed and updated  Yes    Current Symptoms  None    Dizziness severity  Mild    Fatigue severity  Mild          TCM Call       Post hospital issues  None    Should patient be enrolled in anticoag monitoring? No    Scheduled for follow up? Yes    Patients specialists  Cardiologist; Urologist    Referrals needed  Deepika Alvarez MA     Did you obtain your prescribed medications  No    Why were you unable to obtain your medications  No new medications prescribed    Do you need help managing your prescriptions or medications  No    Is transportation to your appointment needed  No    I have advised the patient to call PCP with any new or worsening symptoms  [address] or [facility] other    Support System  Spouse    The type of support provided  Emotional; Financial; Physical    Do you have social support  Yes, as much as I need    Are you recieving any outpatient services  No    Are you recieving home care services  No    Are you using any community resources  No    Current waiver services  No    Have you fallen in the last 12 months  Yes    How many times  three    Interperter language line needed  No    Counseling  Patient    Counseling topics  Risk factor reduction; instructions for management; patient and family education          Patient was admitted for hypertensive emergency and headache, blood pressure improved, she was discharged to transitional care today. She continues to have severe fatigue. Methylmalonic acid was elevated, she was on monthly B12 shots will be changed to once a week.   She continues to take aspirin and Plavix she will continue until November 2020 and then only aspirin. Blood pressure has been elevated at home with most readings above 140/90. She also states she is noticing vaginal discharge for about 1 week whitish in color. She had vaginal yeast infection in the past and feels like she is having another 1. She did not try anything. Review of Systems   Constitutional:  Positive for fatigue. Negative for appetite change. Eyes:  Negative for visual disturbance. Respiratory:  Negative for cough, chest tightness, shortness of breath and wheezing. Cardiovascular:  Negative for chest pain, palpitations and leg swelling. Gastrointestinal:  Negative for abdominal pain, nausea and vomiting. Genitourinary:  Positive for vaginal discharge. Negative for difficulty urinating and frequency. Musculoskeletal:  Negative for arthralgias and joint swelling. Skin:  Negative for rash. Neurological:  Negative for dizziness and headaches. Psychiatric/Behavioral:  Positive for confusion (but improved). Negative for sleep disturbance. Objective     /80 (BP Location: Left arm, Patient Position: Sitting, Cuff Size: Adult)   Pulse 62   Temp 97.8 °F (36.6 °C) (Tympanic)   Resp 16   Wt 76.2 kg (168 lb)   SpO2 96%   BMI 28.84 kg/m²      Physical Exam  Vitals and nursing note reviewed. Constitutional:       General: She is not in acute distress. Appearance: She is well-developed. HENT:      Head: Normocephalic and atraumatic. Eyes:      Conjunctiva/sclera: Conjunctivae normal.   Cardiovascular:      Rate and Rhythm: Normal rate and regular rhythm. Heart sounds: No murmur heard. Pulmonary:      Effort: Pulmonary effort is normal. No respiratory distress. Breath sounds: Normal breath sounds. Abdominal:      General: There is no distension. Tenderness: There is no abdominal tenderness. Musculoskeletal:         General: No swelling. Cervical back: Neck supple. Skin:     General: Skin is warm and dry. Capillary Refill: Capillary refill takes less than 2 seconds. Coloration: Skin is pale. Neurological:      General: No focal deficit present. Mental Status: She is alert. Mental status is at baseline.    Psychiatric:         Behavior: Behavior normal.         Judgment: Judgment normal.       Medications have been reviewed by provider in current encounter    Moon Crane MD

## 2023-11-13 NOTE — ASSESSMENT & PLAN NOTE
BMT ONC Adult Bone Marrow Biopsy Procedure Note  June 16, 2022  /69 (BP Location: Right arm)   Pulse (!) 123   Temp 98.2  F (36.8  C) (Oral)   Resp 16   Wt 57.7 kg (127 lb 1.6 oz)   SpO2 99%   BMI 20.67 kg/m       Learning needs assessment complete within 12 months? YES    DIAGNOSIS: MDS and DLBCL    PROCEDURE: Unilateral Bone Marrow Biopsy and Unilateral Aspirate    LOCATION: [facility] 2nd Floor    Patient s identification was positively verified by verbal identification and invasive procedure safety checklist was completed. Informed consent was obtained. Following the administration of Midazolam 1 mg as pre-medication, patient was placed in the prone position and prepped and draped in a sterile manner. Approximately 10 cc of 1% Lidocaine was used over the left posterior iliac spine. Following this a 3 mm incision was made. Trephine bone marrow core(s) was (were) obtained from the [facility]. Bone marrow aspirates were obtained from the [facility]. Aspirates were sent for morphology, immunophenotyping, cytogenetics and molecular diagnostics. A total of approximately 17 ml of marrow was aspirated. Following this procedure a sterile dressing was applied to the bone marrow biopsy site(s). The patient was placed in the supine position to maintain pressure on the biopsy site. Post-procedure wound care instructions were given.     Complications: NO    Post-procedural pain assessment: 0 out of 10 on the numeric pain rating scale.     Interventions: NO    Length of procedure:20 minutes or less      Procedure performed by:   LUIS ALBERTO Fofana CNP  [facility] [address] [address]  [phone]       Continue statin, aspirin and Plavix until November 20 and then aspirin lifelong. Follow-up with neurology as scheduled.

## 2023-11-14 ENCOUNTER — APPOINTMENT (OUTPATIENT)
Dept: LAB | Facility: AMBULARY SURGERY CENTER | Age: 73
End: 2023-11-14
Payer: MEDICARE

## 2023-11-14 DIAGNOSIS — R80.9 NEPHROTIC RANGE PROTEINURIA: ICD-10-CM

## 2023-11-14 DIAGNOSIS — N18.4 STAGE 4 CHRONIC KIDNEY DISEASE (HCC): ICD-10-CM

## 2023-11-14 LAB
ANA SER QL IA: NEGATIVE
ANION GAP SERPL CALCULATED.3IONS-SCNC: 7 MMOL/L
BACTERIA UR QL AUTO: ABNORMAL /HPF
BILIRUB UR QL STRIP: NEGATIVE
BUN SERPL-MCNC: 39 MG/DL (ref 5–25)
C3 SERPL-MCNC: 144 MG/DL (ref 87–200)
C4 SERPL-MCNC: 32 MG/DL (ref 19–52)
CALCIUM SERPL-MCNC: 8.8 MG/DL (ref 8.4–10.2)
CHLORIDE SERPL-SCNC: 107 MMOL/L (ref 96–108)
CLARITY UR: CLEAR
CO2 SERPL-SCNC: 28 MMOL/L (ref 21–32)
COLOR UR: ABNORMAL
CREAT SERPL-MCNC: 2.8 MG/DL (ref 0.6–1.3)
GFR SERPL CREATININE-BSD FRML MDRD: 16 ML/MIN/1.73SQ M
GLUCOSE P FAST SERPL-MCNC: 184 MG/DL (ref 65–99)
GLUCOSE UR STRIP-MCNC: ABNORMAL MG/DL
HCV AB SER QL: NORMAL
HGB UR QL STRIP.AUTO: NEGATIVE
IGA SERPL-MCNC: 163 MG/DL (ref 66–433)
KETONES UR STRIP-MCNC: NEGATIVE MG/DL
LEUKOCYTE ESTERASE UR QL STRIP: NEGATIVE
NITRITE UR QL STRIP: NEGATIVE
NON-SQ EPI CELLS URNS QL MICRO: ABNORMAL /HPF
PH UR STRIP.AUTO: 6.5 [PH]
POTASSIUM SERPL-SCNC: 4.3 MMOL/L (ref 3.5–5.3)
PROT UR STRIP-MCNC: ABNORMAL MG/DL
RBC #/AREA URNS AUTO: ABNORMAL /HPF
SODIUM SERPL-SCNC: 142 MMOL/L (ref 135–147)
SP GR UR STRIP.AUTO: 1.02 (ref 1–1.03)
UROBILINOGEN UR STRIP-ACNC: <2 MG/DL
WBC #/AREA URNS AUTO: ABNORMAL /HPF

## 2023-11-14 PROCEDURE — 36415 COLL VENOUS BLD VENIPUNCTURE: CPT

## 2023-11-14 PROCEDURE — 84166 PROTEIN E-PHORESIS/URINE/CSF: CPT | Performed by: INTERNAL MEDICINE

## 2023-11-14 PROCEDURE — 84165 PROTEIN E-PHORESIS SERUM: CPT

## 2023-11-14 PROCEDURE — 86225 DNA ANTIBODY NATIVE: CPT

## 2023-11-14 PROCEDURE — 82784 ASSAY IGA/IGD/IGG/IGM EACH: CPT

## 2023-11-14 PROCEDURE — 83516 IMMUNOASSAY NONANTIBODY: CPT

## 2023-11-14 PROCEDURE — 83521 IG LIGHT CHAINS FREE EACH: CPT

## 2023-11-14 PROCEDURE — 86160 COMPLEMENT ANTIGEN: CPT

## 2023-11-14 PROCEDURE — 86334 IMMUNOFIX E-PHORESIS SERUM: CPT

## 2023-11-14 PROCEDURE — 86335 IMMUNFIX E-PHORSIS/URINE/CSF: CPT | Performed by: INTERNAL MEDICINE

## 2023-11-14 PROCEDURE — 86803 HEPATITIS C AB TEST: CPT

## 2023-11-14 PROCEDURE — 86038 ANTINUCLEAR ANTIBODIES: CPT

## 2023-11-14 PROCEDURE — 81001 URINALYSIS AUTO W/SCOPE: CPT

## 2023-11-14 PROCEDURE — 80048 BASIC METABOLIC PNL TOTAL CA: CPT

## 2023-11-14 PROCEDURE — 83520 IMMUNOASSAY QUANT NOS NONAB: CPT

## 2023-11-15 ENCOUNTER — TELEPHONE (OUTPATIENT)
Age: 73
End: 2023-11-15

## 2023-11-15 ENCOUNTER — TELEPHONE (OUTPATIENT)
Dept: NEPHROLOGY | Facility: CLINIC | Age: 73
End: 2023-11-15

## 2023-11-15 DIAGNOSIS — N18.4 CKD (CHRONIC KIDNEY DISEASE) STAGE 4, GFR 15-29 ML/MIN (HCC): Primary | ICD-10-CM

## 2023-11-15 LAB
DSDNA AB SER-ACNC: 1 IU/ML (ref 0–9)
MYELOPEROXIDASE AB SER IA-ACNC: <0.2 UNITS (ref 0–0.9)
PROTEINASE3 AB SER IA-ACNC: <0.2 UNITS (ref 0–0.9)

## 2023-11-15 NOTE — TELEPHONE ENCOUNTER
[place] [address], [address] [place] [address], [facility]    [phone]    Telephone Intake: Geriatric Assessment     - Chart Review  Has this patient been seen by our department in the last 3 years? No  Please route to provider for chart review prior to scheduling and let the caller know that this phone intake will be reviewed IF -  Pt was recently hospitalized  Pt is prescribed medications for behavior management or has a history of psychiatric hospitalization  Pt plans to attend alone    Referral source: Jani Graham who is scheduling/relationship to pt: Shanae Hopson phone number: [phone]    Reason for referral: Patient concerns  and Family member concerns regarding memory concerns and behavior changes/concerns. If there are behavioral concerns, is the pt prescribed medications to manage these? N/A    If so, how many? N/A    Has the patient ever had an inpatient psychiatric hospitalization? No, N/A   What is the goal of the visit? Dx and care plan      Has the patient been seen by a Neurologist or Geriatrician? Yes    If yes, is this appointment for a second opinion? Yes   Has the patient ever been diagnosed with dementia? No  Has the patient had an MRI NeuroQuant within the last 1 year? Yes  (If so, please route to provider to determine if assessment + conference are needed or if only assessment should be scheduled)      Preferred language? English   Highest education level? 2 year of college   Does the patient wear glasses? Yes   Does the patient use hearing aids? No     Is there a living will/healthcare POA in place/If so, who? No, Yes to will      Does the pt/caregiver have access for a virtual visit (computer/smart phone with audio/video)? No    Caller was informed:   Please make sure the pt is accompanied by someone who knows them well / caregiver / family member to participate in this appointment.    Who will accompany the pt (name and relationship)? [facility]   Phone number of person accompanying pt: [phone]  Please make sure the pt attends all appointments, including the assessment, care conference, follow-up, whether in-person or virtual.  For virtual visits, pt must be physically present in [facility]. Office packet mailed out to: [id number] [place] [phone]  Added to wait list for sooner appointments/notified that calls can be short notice (same day/day prior)?  No

## 2023-11-15 NOTE — TELEPHONE ENCOUNTER
----- Message from John Henriquez MD sent at 11/14/2023 11:56 AM EST -----  Creatinine little higher otherwise stable labs  I would repeat a basic metabolic profile in 1 week as long as she is feeling okay today just to make sure this is stable  She also needs to get me a week of blood pressures at some point in the very near future  ----- Message -----  From: Lab, Background User  Sent: 11/14/2023  11:50 AM EST  To: John Henriquez MD

## 2023-11-16 DIAGNOSIS — N18.4 ANEMIA DUE TO STAGE 4 CHRONIC KIDNEY DISEASE: ICD-10-CM

## 2023-11-16 DIAGNOSIS — D63.1 ANEMIA DUE TO STAGE 4 CHRONIC KIDNEY DISEASE: ICD-10-CM

## 2023-11-16 LAB
ALBUMIN SERPL ELPH-MCNC: 3.1 G/DL (ref 3.2–5.1)
ALBUMIN SERPL ELPH-MCNC: 56.4 % (ref 48–70)
ALPHA1 GLOB SERPL ELPH-MCNC: 0.28 G/DL (ref 0.15–0.47)
ALPHA1 GLOB SERPL ELPH-MCNC: 5 % (ref 1.8–7)
ALPHA2 GLOB SERPL ELPH-MCNC: 0.96 G/DL (ref 0.42–1.04)
ALPHA2 GLOB SERPL ELPH-MCNC: 17.4 % (ref 5.9–14.9)
BETA GLOB ABNORMAL SERPL ELPH-MCNC: 0.41 G/DL (ref 0.31–0.57)
BETA1 GLOB SERPL ELPH-MCNC: 7.5 % (ref 4.7–7.7)
BETA2 GLOB SERPL ELPH-MCNC: 6 % (ref 3.1–7.9)
BETA2+GAMMA GLOB SERPL ELPH-MCNC: 0.33 G/DL (ref 0.2–0.58)
GAMMA GLOB ABNORMAL SERPL ELPH-MCNC: 0.42 G/DL (ref 0.4–1.66)
GAMMA GLOB SERPL ELPH-MCNC: 7.7 % (ref 6.9–22.3)
IGG/ALB SER: 1.29 {RATIO} (ref 1.1–1.8)
INTERPRETATION UR IFE-IMP: NORMAL
KAPPA LC FREE SER-MCNC: 63.5 MG/L (ref 3.3–19.4)
KAPPA LC FREE/LAMBDA FREE SER: 1.2 {RATIO} (ref 0.26–1.65)
LAMBDA LC FREE SERPL-MCNC: 52.7 MG/L (ref 5.7–26.3)
PROT PATTERN SERPL ELPH-IMP: ABNORMAL
PROT SERPL-MCNC: 5.5 G/DL (ref 6.4–8.2)

## 2023-11-16 PROCEDURE — 86334 IMMUNOFIX E-PHORESIS SERUM: CPT | Performed by: STUDENT IN AN ORGANIZED HEALTH CARE EDUCATION/TRAINING PROGRAM

## 2023-11-16 PROCEDURE — 84165 PROTEIN E-PHORESIS SERUM: CPT | Performed by: STUDENT IN AN ORGANIZED HEALTH CARE EDUCATION/TRAINING PROGRAM

## 2023-11-16 RX ORDER — FERROUS SULFATE 324(65)MG
TABLET, DELAYED RELEASE (ENTERIC COATED) ORAL
Qty: 45 TABLET | Refills: 0 | Status: SHIPPED | OUTPATIENT
Start: 2023-11-16

## 2023-11-17 ENCOUNTER — TELEPHONE (OUTPATIENT)
Dept: OTHER | Facility: HOSPITAL | Age: 73
End: 2023-11-17

## 2023-11-17 DIAGNOSIS — R39.9 UTI SYMPTOMS: Primary | ICD-10-CM

## 2023-11-17 LAB
ALBUMIN UR ELPH-MCNC: 79.9 %
ALPHA1 GLOB MFR UR ELPH: 4.3 %
ALPHA2 GLOB MFR UR ELPH: 4.2 %
B-GLOBULIN MFR UR ELPH: 8.7 %
GAMMA GLOB MFR UR ELPH: 2.9 %
INTERPRETATION UR IFE-IMP: NORMAL
PLA2R IGG SER IA-ACNC: <1.8 RU/ML (ref 0–19.9)
PROT PATTERN UR ELPH-IMP: NORMAL
PROT UR-MCNC: 816.5 MG/DL

## 2023-11-17 PROCEDURE — 84166 PROTEIN E-PHORESIS/URINE/CSF: CPT | Performed by: STUDENT IN AN ORGANIZED HEALTH CARE EDUCATION/TRAINING PROGRAM

## 2023-11-17 PROCEDURE — 86335 IMMUNFIX E-PHORSIS/URINE/CSF: CPT | Performed by: STUDENT IN AN ORGANIZED HEALTH CARE EDUCATION/TRAINING PROGRAM

## 2023-11-17 RX ORDER — CEPHALEXIN 500 MG/1
500 CAPSULE ORAL EVERY 12 HOURS SCHEDULED
Qty: 30 CAPSULE | Refills: 0 | Status: SHIPPED | OUTPATIENT
Start: 2023-11-17 | End: 2023-12-17

## 2023-11-17 NOTE — PROGRESS NOTES
Cardiology  Hospital Follow up  Office Visit Note  Shirley Foster   75 y.o.   female   MRN: [id number]  [place]  [address] [facility] [address] [place] [address] [phone]    PCP: Mariama Roldan MD  Cardiologist: Dr. Kingston Granado            Summary of recommendations  Increase icosapent ethyl to 2 g twice daily  Fasting lipid profile with direct LDL 4-12 weeks  Repeat BMP has been ordered  Consider increasing rosuvastatin (For HTG), if renal fxn allows  2-week event recorder re: CVA  Follow up will be scheduled with Dr Kingston Granado 4-6 weeks  Colon Ca screening: 10/11/2021 , up to date        Assessment/plan  CVA 10/23  She had received TNK and was placed on stroke pathway for further evaluation. BP on presentation at that time 186/88. MRI demonstrated right putamen infarct. Etiology concerning for cardioembolic, therefore, patient was discharged on DAPT x 21 days with transition to aspirin monotherapy   recommended to obtain long-term cardiac monitoring outpatient. Hypertension, essential.  /95 on amlodipine 5 mg daily, metoprolol tartrate 50 mg every 12, losartan 25 BID, torsemide 5 mg every other day as needed for weight gain above 172 pounds. Monitor. Hyperlipidemia mixed with HTG, on rosuvastatin 5 mg daily, icosapent ethyl 1 g twice daily. Given her her hypertriglyceridemia, this is an atherogenic lipid profile. For now, increase to 2 grams twice daily. Reassess 4-12 weeks with a direct LDL.   Consider uptitrating rosuvastatin if renal fxn permits- for treatment of HTG./  given HTG, a direct LDL is recommended for accuracy   Latest Reference Range & Units 05/23/23 10:26 08/16/23 07:03 10/28/23 04:04   Cholesterol See Comment mg/dL 279 (H) 131 121   Triglycerides See Comment mg/dL 641 (H) 476 (H) 371 (H)   HDL >=50 mg/dL 50 35 (L) 43 (L)   LDL Calculated 0 - 100 mg/dL See Comment See Comment 4   LDL CHOLESTEROL DIRECT 0 - 100 mg/dl 87 37    Type 2 diabetes mellitus on insulin. 3/7/23: Hemoglobin A1C7.1   Latest Reference Range & Units 03/07/23 08:44 09/02/23 08:59 10/03/23 08:34 10/28/23 04:04   Hemoglobin A1C Normal 4.0-5.6%; PreDiabetic 5.7-6.4%; Diabetic >=6.5%; Glycemic control for adults with diabetes <7.0% % 6.8 (H) (E) 7.5 (H) (E) 7.4 (H) (E) 7.1 (H)   Obesity, BMI 30  SHAYAN  COPD  CKD 4. baseline Cr around 1.5-2.0.   last OV 9/2023. following with Dr Houston Solomon Last s Cr 2.8. Hydrate. F/U labs are pending  Hypothyroidism on replacement  History tobacco, in remission. Prior 10-pack-year, 2 packs/day, quitting in 1976  Cardiac testing  TTE 5/2020. EF 55%. No RWMA although this possibly could not be excluded on the basis of the study. LAE. Normal RV size and function. There is no clear evidence of significant pulmonary vascular disease by noninvasive assessment. PASP<40 mmHg. Notably, there is mild IVS septal bounce which may be related to HFpEF. NMR myocardial SPECT September 2021Normal study after pharmacologic vasodilation without reproduction of symptoms. Myocardial perfusion imaging was normal at rest and with stress. Left ventricular systolic function was normal.  SPECT 6.14.23 The ECG and SPECT imaging portions of the stress study are concordant with no evidence of stress induced myocardial ischemia. Event monitor 6/2023. NSR/ Avg 57 bpm. Predominant underlying rhythm was Sinus Rhythm. Isolated SVEswere rare (<1.0%), SVE Couplets were rare (<1.0%), and SVE Triplets wererare (<1.0%). Isolated VEs were frequent (5.5%, 62387), VE Couplets were rare (<1.0%, 10), and no VE Triplets were present. Ventricular Trigeminy was present  TTE 10/30/23. EF 55%. Wall motion is normal.  Grade 1 DD. Mild MR. Mild TR. GARLAND Sullivan is a 74 yo female with hypertension, dyslipidemia, diabetes, SHAYAN, CKD 4, COPD, hypothyroidism on replacement. She is obese, BMI 30. .  Given a history of chest pain, she underwent a stress test in September of 2021 that showed no evidence of ischemia. Given CKD 4, her nephrologist has been managing her hypertension. She follows with Dr. Rhett Nageotte. She was last seen in the office October 2022      ED Adm 5/29/23  CC: chest pain. Woke up with palpitations at 3 AM, chest discomfort. Woke up at 6 AM symptoms  EKG normal sinus rhythm. PVCs. Nonspecific T wave changes  Pertinent labs: Creatinine 2.0  Recently started on antibiotics for UTI as an outpatient  Trop 7,0. WBC midly elevated  CXR: Unremarkable  /84  Discharged to follow-p with cardiology as outpatient    ED Adm 6/3/23  CC chest pain  EKG NSR, first-degree AV block  /83  WBC improved  Hs trop 5,6  cr 2.26  given IV flds  Advised outpatient cardiology follow-up    6/5/23  ER follow-up. She is accompanied by her . (PMH: HTN. HL.  CKD 4. DM 2. COPD. SHAYAN.)  ROS: In the last 5 days that she had 2 episodes of palpitations that awakened her from her sleep, prompting ED visits as above. She notes she is sedentary, given chronic knee and leg problems. She is currently taking a drug holiday off her statin therapy to see if this will improve her symptoms. She is currently off her Inspire therapy for her sleep apnea, given a mechanical fall with resultant facial injuries, about 3 months ago. Her facial injuries have improved and she is considering restarting the Inspire, which I encouraged her to do. She does note rintermittent chest pressure. She is not certain whether this occurs with exertion. She is not certain whether this  occurs concurrently with her palpitations. We reviewed her most recent hospitalizations, and labs. I recommend she hydrate within her allotted amount up to 64 ounces, she is drinking less than this  /74  EKG: Sinus rhythm 73 bpm, first-degree AV block with frequent unifocal PVCs  Exam otherwise unremarkable  Today, I recommend she increase potassium in her diet.   Her last potassium was 3.7, prior 3.8 and she is on a daily diuretic  I encouraged her to hydrate  I recommend she restart Shelley Mar  We will obtain a pharmacological nuclear stress test as she has multiple CV risk factors: Family history, diabetes, hypertension, uncontrolled mixed dyslipidemia. She walks with a cane. I will increase her metoprolol to 50 mg every 12  We will obtain a 1 week Zio patch on the increased dose. We can assess the burden of PVCs, and assess for other arrhythmias such as A-fib given her history of sleep apnea  She will follow with a cardiologist in a short interval.  Of note, she tells me she is a "DNR". If her stress test is abnormal she will need to have a conversation with her cardiologist about next steps      Interval history  7/18/23 OV Dr Woo Lozano 8/16-8/19/23   Recurrent UTI/NIKOS on CKD stage IV   Patient had a creatinine of 4.7 (baseline 2) was told to go to the emergency room. In the ED, CT scan was completed without any abnormalities. Patient was admitted for NIKOS secondary to poor p.o. intake and UTI with Serratia. While admitted, patient was placed on IV fluids and her home torsemide and olmesartan were held in the setting of NIKOS. Nephrology was consulted   Patient was started on ertapenem for her presumed UTI. Additionally, delirium precautions were started due to altered mental status. Infectious disease was consulted and stopped the antibiotics, stating that rather than an UTI patient was experiencing sequelae of her known urinary retention. Urology was consulted and recommended increased straight catheterization frequency with PVR monitoring while admitted and at home. Patient was seen by PT and OT with recommendations to be discharged with ambulatory PT and OT referrals. Patient's NIKOS on CKD resolved with IV fluids. Adjustments to her home medications were made including torsemide 5 mg every other day when weight exceeds 172 pounds and olmesartan was held.    However, as patient remained encephalopathic, the decision was made to start her on cefpodoxime upon discharge for 5-day course of antibiotics with follow-up outpatient on 8/21/2023. Patient will also follow-up outpatient with nephrology and plans to start physical therapy    Adm 9/9-9/12/23 confusion  Evaluated by geriatrics  Recommended outpatient cognitive testing   CR was 2.5 on admission. Given IV fluids  Creatinine 2.0 at discharge    Adm 9/12-9/14/23 SOB  Rx COPD exacerbation    Adm  10/27-10/30/23 CVA  CC: severe HA, word findings difficulty/aphasia   stroke alert, s/p TNK at [facility] on 10/27/23. recent outpatient MRI on 10/26 that was negative for abnormal findings/atrophy   CTH negative, CTA without high grade stenosis of occlusion   Repeat CTH stable post TNKtPA  MRI - right putamen CVA   Echocardiogram - normal EF, G1DD   Goal BP now normotensive   ASA and Plavix x 21 days, then ASA thereafter. Zio patch for 2 weeks, per neuro. Concern for cardioembolic CVA  Hx of inspire 11/10/2021, though noncompliant and device turned off    also having episodes of confusion prior to this event. She had Seroquel in the past, but developed diarrhea. Recommending a geriatric evaluation as an outpatient. Patient was evaluated by PT/OT and will go home with home health care. Adm 11/7-11/9 23- accelerated Htn  elevated blood pressure and headache, did not receive any medication in the emergency department. Per ED physician patient felt to be somewhat confused while she was in the emergency department-and hence requested for admission. CT head negative on admission   Neurology eval'd  Allowed permissive HTN on admission though repeat MRI brain negative,   resume BP meds and monitor   Only takes torsemide PRN for weight gain   Continue metoprolol. Advised pt and  to keep close log of BP at home and f/u outpt for further adjustment PRN       11/20/23  Hospital follow-up. She is accompanied by her   /95  Weight 163. She has not taken torsemide 5 in a long time  ROS: Expressive aphasia. Occasional palpitations it sounds like. Current antihypertensive meds: Amlodipine 5 mg daily, losartan 25 mg twice daily, metoprolol tartrate 50 mg every 12, torsemide 5 mg every other day as needed for weight above 172 pounds  We reviewed her hospital course  2-week event monitor is recommended she is agreeable  Encouraged hydration. Repeat BMP has been requested  Given her hypertriglyceridemia, recommended uptitrating icosapent ethyl              I have spent 40 minutes with Patient and family today in which greater than 50% of this time was spent in counseling/coordination of care regarding Diagnostic results, Risks and benefits of tx options, Instructions for management, Patient and family education, Importance of tx compliance, Risk factor reductions, Impressions, Documenting in the medical record, Reviewing / ordering tests, medicine, procedures   and Obtaining or reviewing history  . Assessment  Diagnoses and all orders for this visit:    Hospital discharge follow-up    Cerebrovascular accident (CVA), unspecified mechanism ([address])  -     AMB extended holter monitor; Future  -     Lipid Panel With Direct LDL; Future    Essential hypertension    Mixed hyperlipidemia  -     Icosapent Ethyl 1 g CAPS; Take 2 capsules (2 g total) by mouth 2 (two) times a day  -     Lipid Panel With Direct LDL;  Future    SHAYAN (obstructive sleep apnea)    Type 2 diabetes mellitus with stage 4 chronic kidney disease, with long-term current use of insulin ([address])          Past Medical History:   Diagnosis Date    Actinic keratosis     Acute cystitis without hematuria 04/28/2023    Acute cystitis without hematuria 04/28/2023    Acute-on-chronic kidney injury      NIKOS (acute kidney injury) ([address]) 05/08/2020    Allergic     Allergic rhinitis     Ambulatory dysfunction 10/22/2020    AMS (altered mental status) 07/14/2020    Anesthesia     pt reports "had to use double lumen endo tube for hiatal hernia repair/so surgeon could get to where he needed to work"    Arthritis     Aspiration into airway     Michael esophagus 1993    Lot of stress with children    Basal cell carcinoma 2007    left cheek     BCC (basal cell carcinoma) 05/27/2021    Left Nasal tip    Breast discharge 06/19/2023    Breast pain 06/19/2023    Cancer ([address])     squamous cell cancer on forhead    Cancer ([address])     basal cell on nose     Cholelithiasis     Chronic kidney disease 2000, 2018    Stones, kidney disease stage 4    Chronic pain disorder     bilat feet and joint pain on occas    Colon polyp     Confusion 10/30/2023    Constipation     COPD (chronic obstructive pulmonary disease) ([address])     COVID-19 08/2021    recovered at home/did receive monoclonal infusion    COVID-19 virus infection 08/16/2021    Dental bridge present     Depression     Diabetes mellitus ([address])     Type 2    Diabetic neuropathy ([address])     Difficulty walking 2017    Disease of thyroid gland     Dyspnea     Elevated liver enzymes 04/04/2023    Epigastric abdominal pain with severe diabetic gastroparesis, status post EGD/Botox injection, 5/14 05/17/2021    Facial abrasion, initial encounter 03/22/2023    Fall 03/22/2023    Family history of thyroid problem     Fatty liver     Fibromyalgia, primary     Fracture of orbital floor, blow-out, right, closed, with routine healing, subsequent encounter 03/22/2023    GERD (gastroesophageal reflux disease)     Heart burn     Hiatal hernia     History of colon polyps 07/30/2021    History of kidney stones 09/29/2020    Hx of recurrent kidney stones    History of kidney stones 09/29/2020    Hx of recurrent kidney stones  Formatting of this note might be different from the original. Hx of recurrent kidney stones  Last Assessment & Plan:  Formatting of this note might be different from the original. We will set her up for follow-up in 3 months with a KUB prior.   She knows to call if she has any kidney stone type pain in the meantime. History of pneumonia     History of repair of hiatal hernia 05/03/2020    Hyperlipidemia     Hyperplasia, parathyroid ([address])     Hypertension     Hypertensive urgency 10/27/2023    Hyponatremia 04/26/2023    Hypotension 04/13/2022    Kidney problem     Kidney stone     Left hip pain 10/05/2023    Leukocytosis 07/14/2020    Memory loss Julu2 2020    Motion sickness     Motion sickness     Multiple closed fractures of ribs of right side 03/22/2023    Nasal bones, closed fracture 03/22/2023    Neck pain     Obesity 1978    Obesity (BMI 30.0-34.9)     Other chest pain 09/13/2021    Other fatigue 09/21/2023    Peripheral neuropathy     Pollen allergies     RLS (restless legs syndrome)     S/P foot surgery 07/20/2022    SCC (squamous cell carcinoma) 05/04/2021    left mid forehead    SCC (squamous cell carcinoma) 2023    chest    Seasonal allergies     Shingles 01 05 23    Sleep apnea     has inspire    Squamous cell skin cancer 2007    left cheek     Swollen ankles     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, left     great toe fusion    Urinary incontinence     Urinary tract infection 03/28/2022    Wears glasses        Review of Systems   Constitutional: Negative for chills. Cardiovascular:  Negative for chest pain, claudication, cyanosis, dyspnea on exertion, irregular heartbeat, leg swelling, near-syncope, orthopnea, palpitations, paroxysmal nocturnal dyspnea and syncope. Respiratory:  Negative for cough and shortness of breath. Gastrointestinal:  Negative for heartburn and nausea. Neurological:  Negative for dizziness, focal weakness, headaches, light-headedness and weakness. All other systems reviewed and are negative. Allergies   Allergen Reactions    Ciprofloxacin Hives    Sulfamethoxazole-Trimethoprim Other (See Comments)     Tongue swelling    Sulfamethoxazole-Trimethoprim Tongue Swelling    Ciprofloxacin Hives and Itching     .     Current Outpatient Medications: acetaminophen (TYLENOL) 325 mg tablet, Take 3 tablets (975 mg total) by mouth every 8 (eight) hours, Disp: , Rfl: 0    albuterol (Ventolin HFA) 90 mcg/act inhaler, Inhale 2 puffs every 6 (six) hours as needed for wheezing, Disp: 54 g, Rfl: 0    amLODIPine (NORVASC) 5 mg tablet, Take 1 tablet (5 mg total) by mouth daily Do not start before October 31, 2023., Disp: , Rfl: 0    aspirin (ECOTRIN LOW STRENGTH) 81 mg EC tablet, Take 1 tablet (81 mg total) by mouth daily Do not start before October 31, 2023., Disp: , Rfl: 0    B-D ULTRAFINE III SHORT PEN 31G X 8 MM MISC, , Disp: , Rfl: 3    Calcium Citrate 1040 MG TABS, Take 500 mg by mouth Three times a day, Disp: , Rfl:     cephalexin (KEFLEX) 500 mg capsule, Take 1 capsule (500 mg total) by mouth every 12 (twelve) hours, Disp: 30 capsule, Rfl: 0    cholecalciferol (VITAMIN D3) 1,000 units tablet, Take 2 tablets (2,000 Units total) by mouth daily, Disp: 180 tablet, Rfl: 1    clopidogrel (PLAVIX) 75 mg tablet, Take 1 tablet (75 mg total) by mouth daily for 21 days, Disp: 21 tablet, Rfl: 0    Coenzyme Q10 (CoQ10) 100 MG CAPS, Take 100 mg by mouth in the morning, Disp: , Rfl:     DULoxetine (CYMBALTA) 30 mg delayed release capsule, Take 1 capsule (30 mg total) by mouth daily, Disp: , Rfl: 0    famotidine (PEPCID) 10 mg tablet, Take 1 tablet (10 mg total) by mouth daily Do not start before September 13, 2023., Disp: 30 tablet, Rfl: 0    ferrous sulfate 324 MG TBEC, TAKE 1 TABLET (324 MG TOTAL) BY MOUTH EVERY OTHER DAY, Disp: 45 tablet, Rfl: 0    Icosapent Ethyl 1 g CAPS, Take 2 capsules (2 g total) by mouth 2 (two) times a day, Disp: 120 capsule, Rfl: 3    insulin aspart (NovoLOG) 100 units/mL injection, Inject under the skin 3 (three) times a day before meals 18 units, 18 units, 18 units. , Disp: , Rfl:     insulin detemir (Levemir FlexTouch) 100 Units/mL injection pen, Inject 50 Units under the skin every evening , Disp: , Rfl:     Lactobacillus (PROBIOTIC ACIDOPHILUS PO), Take 1 capsule by mouth 2 (two) times a day, Disp: , Rfl:     levothyroxine 125 mcg tablet, Take 1 tablet (125 mcg total) by mouth daily in the early morning Do not start before 2023., Disp: , Rfl: 0    losartan (COZAAR) 50 mg tablet, Take 1 tablet (50 mg total) by mouth 2 (two) times a day, Disp: 180 tablet, Rfl: 0    metoprolol tartrate (LOPRESSOR) 50 mg tablet, Take 1 tablet (50 mg total) by mouth every 12 (twelve) hours, Disp: 180 tablet, Rfl: 3    pantoprazole (PROTONIX) 40 mg tablet, Take 1 tablet (40 mg total) by mouth 2 (two) times a day, Disp: 180 tablet, Rfl: 1    pramipexole (MIRAPEX) 0.25 mg tablet, Take 1 tablet (0.25 mg total) by mouth daily at bedtime, Disp: 90 tablet, Rfl: 2    rosuvastatin (CRESTOR) 5 mg tablet, Take 1 tablet (5 mg total) by mouth daily, Disp: 90 tablet, Rfl: 3    torsemide (DEMADEX) 10 mg tablet, Take 0.5 tablets (5 mg total) by mouth every other day Take this medication only if your weight exceeds 172 pounds, Disp: 30 tablet, Rfl: 0    Current Facility-Administered Medications:     cyanocobalamin injection 1,000 mcg, 1,000 mcg, Intramuscular, Weekly, Clau Watkins MD, 1,000 mcg at 23 1112        Social History     Socioeconomic History    Marital status: /Civil Union     Spouse name: Not on file    Number of children: Not on file    Years of education: Not on file    Highest education level: Not on file   Occupational History    Occupation: RETIRED   Tobacco Use    Smoking status: Former     Packs/day: 2.00     Years: 10.00     Total pack years: 20.00     Types: Cigarettes     Start date: 1966     Quit date: 1976     Years since quittin.9     Passive exposure: Past    Smokeless tobacco: Never    Tobacco comments:     Smoked 2 pack a day   Vaping Use    Vaping Use: Never used   Substance and Sexual Activity    Alcohol use: Yes     Comment: 1 or 2 a year    Drug use: No    Sexual activity: Not Currently     Partners: Male     Birth control/protection: Abstinence, Post-menopausal, Female Sterilization     Comment: defer   Other Topics Concern    Not on file   Social History Narrative    ** Merged History Encounter **          Social Determinants of Health     Financial Resource Strain: Low Risk  (5/24/2023)    Overall Financial Resource Strain (CARDIA)     Difficulty of Paying Living Expenses: Not hard at all   Food Insecurity: No Food Insecurity (11/7/2023)    Hunger Vital Sign     Worried About Running Out of Food in the Last Year: Never true     Ran Out of Food in the Last Year: Never true   Transportation Needs: No Transportation Needs (11/7/2023)    PRAPARE - Transportation     Lack of Transportation (Medical): No     Lack of Transportation (Non-Medical):  No   Physical Activity: Not on file   Stress: Not on file   Social Connections: Not on file   Intimate Partner Violence: Not on file   Housing Stability: Low Risk  (11/7/2023)    Housing Stability Vital Sign     Unable to Pay for Housing in the Last Year: No     Number of Places Lived in the Last Year: 1     Unstable Housing in the Last Year: No       Family History   Problem Relation Age of Onset    Heart disease Mother     Depression Mother     Hypertension Mother     COPD Mother     Hearing loss Mother     Anxiety disorder Mother     Heart disease Father     Lung cancer Father 79        Smoker     Cancer Father         brain    Alcohol abuse Father     Dementia Father     Hypertension Father     Thyroid disease Father     COPD Father     Arthritis Father     Brain cancer Father 76    Hypertension Sister     Diabetes Sister     Heart disease Sister     Thyroid disease Sister     Cancer Sister         Lympoma, lung    Lung cancer Sister 78    Anxiety disorder Sister     Migraines Sister     Hypertension Brother     Diabetes Brother     Cancer Brother         Throat    Dementia Brother     Stroke Brother     Hypertension Brother     Heart disease Brother     Diabetes Brother Hypertension Brother     No Known Problems Son     No Known Problems Son     Brain cancer Paternal Aunt         unknown age    Diabetes Sister     Hypertension Sister     Diabetes Brother     Breast cancer Neg Hx        Physical Exam  Vitals and nursing note reviewed. Constitutional:       General: She is not in acute distress. Appearance: She is not diaphoretic. HENT:      Head: Normocephalic and atraumatic. Eyes:      Conjunctiva/sclera: Conjunctivae normal.   Cardiovascular:      Rate and Rhythm: Normal rate and regular rhythm. Pulses: Intact distal pulses. Heart sounds: Normal heart sounds. Pulmonary:      Effort: Pulmonary effort is normal.      Breath sounds: Normal breath sounds. Abdominal:      General: Bowel sounds are normal.      Palpations: Abdomen is soft. Musculoskeletal:         General: Normal range of motion. Cervical back: Normal range of motion and neck supple. Skin:     General: Skin is warm and dry. Neurological:      Mental Status: She is alert. Mental status is at baseline. Comments: Some expressive aphasia         Vitals: Blood pressure 140/95, pulse 91, height 5' 2" (1.575 m), weight 74.3 kg (163 lb 12.8 oz), SpO2 97 %.    Wt Readings from Last 3 Encounters:   11/20/23 74.3 kg (163 lb 12.8 oz)   11/20/23 76.2 kg (168 lb)   11/13/23 76.2 kg (168 lb)         Labs & Results:  Lab Results   Component Value Date    WBC 8.38 11/09/2023    HGB 11.4 (L) 11/09/2023    HCT 35.8 11/09/2023    MCV 89 11/09/2023     11/09/2023     BNP   Date Value Ref Range Status   03/24/2023 35 0 - 100 pg/mL Final     No components found for: "CHEM"  Total CK   Date Value Ref Range Status   09/09/2023 66 26 - 192 U/L Final   06/13/2023 60 26 - 192 U/L Final   05/23/2023 27 26 - 192 U/L Final     Troponin I   Date Value Ref Range Status   07/14/2020 <0.02 <=0.04 ng/mL Final     Comment:       [employer] Chemistry analyzer 99% cutoff is > 0.04 ng/mL in network labs     o cTnI 99% cutoff is useful only when applied to patients in the clinical setting of myocardial ischemia   o cTnI 99% cutoff should be interpreted in the context of clinical history, ECG findings and possibly cardiac imaging to establish correct diagnosis. o cTnI 99% cutoff may be suggestive but clearly not indicative of a coronary event without the clinical setting of myocardial ischemia.     2020 <0.02 <=0.04 ng/mL Final     Comment:     Autovalidation override  [employer] Chemistry analyzer 99% cutoff is > 0.04 ng/mL in network labs     o cTnI 99% cutoff is useful only when applied to patients in the clinical setting of myocardial ischemia   o cTnI 99% cutoff should be interpreted in the context of clinical history, ECG findings and possibly cardiac imaging to establish correct diagnosis. o cTnI 99% cutoff may be suggestive but clearly not indicative of a coronary event without the clinical setting of myocardial ischemia. 2020 <0.02 <=0.04 ng/mL Final     Comment:     Autovalidation override  [employer] Chemistry analyzer 99% cutoff is > 0.04 ng/mL in network labs     o cTnI 99% cutoff is useful only when applied to patients in the clinical setting of myocardial ischemia   o cTnI 99% cutoff should be interpreted in the context of clinical history, ECG findings and possibly cardiac imaging to establish correct diagnosis. o cTnI 99% cutoff may be suggestive but clearly not indicative of a coronary event without the clinical setting of myocardial ischemia.        Results for orders placed during the hospital encounter of 20    Echo complete with contrast if indicated    [address]  [phone]    Transthoracic Echocardiogram  2D, M-mode, Doppler, and Color Doppler    Study date:  03-May-2020    Patient: Thalia Weems  MR number: [id number]  Account number: [de-identified]  : 1950  Age: 69 years  Gender: Female  Status: Inpatient  Location: Bedside  Height: 62 in  Weight: 190 lb  BP: 149/ 70 mmHg    Indications: Pulmonary Hypertension    Diagnoses: I27.0 - Primary pulmonary hypertension    Sonographer:  ISIDRO Mitchell  Referring Physician:  Cris Rangel PA-C  Group:  [facility]  Interpreting Physician:  Gerardo Cummins MD    IMPRESSIONS:  There is no clear evidence of significant pulmonary vascular disease by noninvasive assessment. PASP<40 mmHg. Notably, there is mild IVS septal bounce which may be related to HFpEF. SUMMARY    LEFT VENTRICLE:  Systolic function was normal. Ejection fraction was estimated in the range of 55 %. Although no diagnostic regional wall motion abnormality was identified, this possibility cannot be completely excluded on the basis of this study. LEFT ATRIUM:  The atrium was dilated. MITRAL VALVE:  There was trace regurgitation. HISTORY: PRIOR HISTORY: NIKOS,SHAYAN,CKD,DM,Pulmonary HTN    PROCEDURE: The procedure was performed at the bedside. This was a routine study. The transthoracic approach was used. The study included complete 2D imaging, M-mode, complete spectral Doppler, and color Doppler. The heart rate was 62 bpm,  at the start of the study. Echocardiographic views were limited due to lung interference. This was a technically difficult study. LEFT VENTRICLE: Size was normal. Systolic function was normal. Ejection fraction was estimated in the range of 55 %. Although no diagnostic regional wall motion abnormality was identified, this possibility cannot be completely excluded on  the basis of this study. Wall thickness was normal.    RIGHT VENTRICLE: The size was normal. Systolic function was normal.    LEFT ATRIUM: The atrium was dilated. RIGHT ATRIUM: Size was normal.    MITRAL VALVE: DOPPLER: There was no evidence for stenosis. There was trace regurgitation. AORTIC VALVE: DOPPLER: There was no evidence for stenosis.  There was no regurgitation. TRICUSPID VALVE: DOPPLER: There was no evidence for stenosis. There was no significant regurgitation. PULMONIC VALVE: DOPPLER: There was no evidence for stenosis. There was no significant regurgitation. PERICARDIUM: There was no pericardial effusion. AORTA: The root exhibited normal size. SYSTEM MEASUREMENT TABLES    2D  %FS: 28.1 %  Ao Diam: 2.75 cm  EDV(Teich): 91.53 ml  EF(Teich): 54.51 %  ESV(Teich): 41.64 ml  IVSd: 1.05 cm  LA Area: 21.25 cm2  LA Diam: 3.49 cm  LVEDV MOD A4C: 98.62 ml  LVEF MOD A4C: 52.55 %  LVESV MOD A4C: 46.8 ml  LVIDd: 4.48 cm  LVIDs: 3.22 cm  LVLd A4C: 7.72 cm  LVLs A4C: 6.58 cm  LVPWd: 1.13 cm  RA Area: 11.84 cm2  RVIDd: 3.24 cm  SV MOD A4C: 51.82 ml  SV(Teich): 49.89 ml    CW  TR MaxP.75 mmHg  TR Vmax: 2.95 m/s    MM  TAPSE: 1.44 cm    PW  E': 0.08 m/s  E/E': 10.52  MV A Steven: 0.69 m/s  MV Dec [place]: 4.34 m/s2  MV DecT: 193.96 ms  MV E Steven: 0.84 m/s  MV E/A Ratio: 1.23  MV PHT: 56.25 ms  MVA By PHT: 3.91 cm2    [facility]    Prepared and electronically signed by    Fauzia Baeza MD  Signed [id number] 13:36:59    No results found for this or any previous visit. This note was completed in part utilizing [employer] direct voice recognition software. Grammatical errors, random word insertion, spelling mistakes, and incomplete sentences may be an occasional consequence of the system secondary to software limitations, ambient noise and hardware issues. At the time of dictation, efforts were made to edit, clarify and /or correct errors. Please read the chart carefully and recognize, using context, where substitutions have occurred.   If you have any questions or concerns about the context, text or information contained within the body of this dictation, please contact myself, the provider, for further clarification

## 2023-11-17 NOTE — TELEPHONE ENCOUNTER
Patient  called in asking for a new script for medication cephalexin 250mg 1 tab daily. Please place order if agreeable to it.  Med not seen on medication list

## 2023-11-20 ENCOUNTER — OFFICE VISIT (OUTPATIENT)
Dept: CARDIOLOGY CLINIC | Facility: CLINIC | Age: 73
End: 2023-11-20
Payer: MEDICARE

## 2023-11-20 ENCOUNTER — OFFICE VISIT (OUTPATIENT)
Dept: INTERNAL MEDICINE CLINIC | Facility: CLINIC | Age: 73
End: 2023-11-20
Payer: MEDICARE

## 2023-11-20 VITALS
BODY MASS INDEX: 28.84 KG/M2 | RESPIRATION RATE: 14 BRPM | HEART RATE: 64 BPM | OXYGEN SATURATION: 96 % | WEIGHT: 168 LBS | SYSTOLIC BLOOD PRESSURE: 140 MMHG | DIASTOLIC BLOOD PRESSURE: 90 MMHG | TEMPERATURE: 97.9 F

## 2023-11-20 VITALS
HEIGHT: 62 IN | BODY MASS INDEX: 30.14 KG/M2 | WEIGHT: 163.8 LBS | OXYGEN SATURATION: 97 % | SYSTOLIC BLOOD PRESSURE: 140 MMHG | DIASTOLIC BLOOD PRESSURE: 95 MMHG | HEART RATE: 91 BPM

## 2023-11-20 DIAGNOSIS — G25.81 RLS (RESTLESS LEGS SYNDROME): ICD-10-CM

## 2023-11-20 DIAGNOSIS — Z09 HOSPITAL DISCHARGE FOLLOW-UP: Primary | ICD-10-CM

## 2023-11-20 DIAGNOSIS — E11.22 TYPE 2 DIABETES MELLITUS WITH STAGE 4 CHRONIC KIDNEY DISEASE, WITH LONG-TERM CURRENT USE OF INSULIN (HCC): ICD-10-CM

## 2023-11-20 DIAGNOSIS — I10 ESSENTIAL HYPERTENSION: ICD-10-CM

## 2023-11-20 DIAGNOSIS — E03.9 HYPOTHYROIDISM, UNSPECIFIED TYPE: ICD-10-CM

## 2023-11-20 DIAGNOSIS — Z87.440 HISTORY OF RECURRENT UTIS: ICD-10-CM

## 2023-11-20 DIAGNOSIS — F03.918 BEHAVIORAL AND PSYCHOLOGICAL SYMPTOMS OF DEMENTIA (HCC): Primary | ICD-10-CM

## 2023-11-20 DIAGNOSIS — E78.2 MIXED HYPERLIPIDEMIA: ICD-10-CM

## 2023-11-20 DIAGNOSIS — E55.9 VITAMIN D DEFICIENCY: ICD-10-CM

## 2023-11-20 DIAGNOSIS — G47.33 OSA (OBSTRUCTIVE SLEEP APNEA): ICD-10-CM

## 2023-11-20 DIAGNOSIS — I63.9 CEREBROVASCULAR ACCIDENT (CVA), UNSPECIFIED MECHANISM (HCC): ICD-10-CM

## 2023-11-20 DIAGNOSIS — N18.4 TYPE 2 DIABETES MELLITUS WITH STAGE 4 CHRONIC KIDNEY DISEASE, WITH LONG-TERM CURRENT USE OF INSULIN (HCC): ICD-10-CM

## 2023-11-20 DIAGNOSIS — E53.8 B12 DEFICIENCY: ICD-10-CM

## 2023-11-20 DIAGNOSIS — Z79.4 TYPE 2 DIABETES MELLITUS WITH STAGE 4 CHRONIC KIDNEY DISEASE, WITH LONG-TERM CURRENT USE OF INSULIN (HCC): ICD-10-CM

## 2023-11-20 DIAGNOSIS — R41.3 MEMORY CHANGES: ICD-10-CM

## 2023-11-20 PROBLEM — I27.20 PULMONARY HYPERTENSION (HCC): Status: RESOLVED | Noted: 2019-07-31 | Resolved: 2023-11-20

## 2023-11-20 PROBLEM — Z98.890 HISTORY OF NISSEN FUNDOPLICATION: Status: RESOLVED | Noted: 2023-10-27 | Resolved: 2023-11-20

## 2023-11-20 PROBLEM — R06.00 DYSPNEA: Status: RESOLVED | Noted: 2023-10-27 | Resolved: 2023-11-20

## 2023-11-20 PROBLEM — Z90.49 HISTORY OF CHOLECYSTECTOMY: Status: RESOLVED | Noted: 2023-10-27 | Resolved: 2023-11-20

## 2023-11-20 PROBLEM — N39.46 MIXED STRESS AND URGE URINARY INCONTINENCE: Status: ACTIVE | Noted: 2021-03-25

## 2023-11-20 PROBLEM — E11.9 TYPE 2 DIABETES MELLITUS (HCC): Status: RESOLVED | Noted: 2023-10-27 | Resolved: 2023-11-20

## 2023-11-20 PROBLEM — E11.21 DIABETIC NEPHROPATHY ASSOCIATED WITH TYPE 2 DIABETES MELLITUS (HCC): Status: RESOLVED | Noted: 2023-06-18 | Resolved: 2023-11-20

## 2023-11-20 PROCEDURE — 99214 OFFICE O/P EST MOD 30 MIN: CPT | Performed by: INTERNAL MEDICINE

## 2023-11-20 PROCEDURE — 96372 THER/PROPH/DIAG INJ SC/IM: CPT

## 2023-11-20 PROCEDURE — 99215 OFFICE O/P EST HI 40 MIN: CPT | Performed by: NURSE PRACTITIONER

## 2023-11-20 RX ORDER — ICOSAPENT ETHYL 1000 MG/1
2 CAPSULE ORAL 2 TIMES DAILY
Qty: 120 CAPSULE | Refills: 3 | Status: SHIPPED | OUTPATIENT
Start: 2023-11-20 | End: 2023-11-22 | Stop reason: SDUPTHER

## 2023-11-20 RX ORDER — PRAMIPEXOLE DIHYDROCHLORIDE 0.25 MG/1
0.25 TABLET ORAL
Qty: 90 TABLET | Refills: 2
Start: 2023-11-20

## 2023-11-20 RX ADMIN — CYANOCOBALAMIN 1000 MCG: 1000 INJECTION, SOLUTION INTRAMUSCULAR; SUBCUTANEOUS at 11:12

## 2023-11-20 NOTE — LETTER
November 20, 2023     Lauryn Hodge MD  [address] [facility] [address]    Patient: Meghna Coker   YOB: 1950   Date of Visit: 11/20/2023       Dear Dr. Idania Barnes:    Thank you for referring Milly Michaud to me for evaluation. Below are my notes for this consultation. If you have questions, please do not hesitate to call me. I look forward to following your patient along with you. Sincerely,        RAMIRO Martin        CC: MD Solange Gutierrez, [address] [place] [address]  11/20/2023  3:06 PM  Sign when Signing Visit  Cardiology  Hospital Follow up  Office Visit Note  Meghna Coker   75 y.o.   female   MRN: [id number]  [facility]  [address] [facility] [address] [place] [address]  [phone]    PCP: Luaryn Hodge MD  Cardiologist: Dr. Bernard Torres            Summary of recommendations  Increase icosapent ethyl to 2 g twice daily  Fasting lipid profile with direct LDL 4-12 weeks  Repeat BMP has been ordered  Consider increasing rosuvastatin (For HTG), if renal fxn allows  2-week event recorder re: CVA  Follow up will be scheduled with Dr Bernard Torres 4-6 weeks  Colon Ca screening: 10/11/2021 , up to date        Assessment/plan  CVA 10/23  She had received TNK and was placed on stroke pathway for further evaluation. BP on presentation at that time 186/88. MRI demonstrated right putamen infarct. Etiology concerning for cardioembolic, therefore, patient was discharged on DAPT x 21 days with transition to aspirin monotherapy   recommended to obtain long-term cardiac monitoring outpatient. Hypertension, essential.  /95 on amlodipine 5 mg daily, metoprolol tartrate 50 mg every 12, losartan 25 BID, torsemide 5 mg every other day as needed for weight gain above 172 pounds. Monitor. Hyperlipidemia mixed with HTG, on rosuvastatin 5 mg daily, icosapent ethyl 1 g twice daily. Given her her hypertriglyceridemia, this is an atherogenic lipid profile.  For now, increase to 2 grams twice daily. Reassess 4-12 weeks with a direct LDL. Consider uptitrating rosuvastatin if renal fxn permits- for treatment of HTG./  given HTG, a direct LDL is recommended for accuracy   Latest Reference Range & Units 05/23/23 10:26 08/16/23 07:03 10/28/23 04:04   Cholesterol See Comment mg/dL 279 (H) 131 121   Triglycerides See Comment mg/dL 641 (H) 476 (H) 371 (H)   HDL >=50 mg/dL 50 35 (L) 43 (L)   LDL Calculated 0 - 100 mg/dL See Comment See Comment 4   LDL CHOLESTEROL DIRECT 0 - 100 mg/dl 87 37    Type 2 diabetes mellitus on insulin. 3/7/23: Hemoglobin A1C7.1   Latest Reference Range & Units 03/07/23 08:44 09/02/23 08:59 10/03/23 08:34 10/28/23 04:04   Hemoglobin A1C Normal 4.0-5.6%; PreDiabetic 5.7-6.4%; Diabetic >=6.5%; Glycemic control for adults with diabetes <7.0% % 6.8 (H) (E) 7.5 (H) (E) 7.4 (H) (E) 7.1 (H)   Obesity, BMI 30  SHAYAN  COPD  CKD 4. baseline Cr around 1.5-2.0.   last OV 9/2023. following with Dr David Olson Last s Cr 2.8. Hydrate. F/U labs are pending  Hypothyroidism on replacement  History tobacco, in remission. Prior 10-pack-year, 2 packs/day, quitting in 1976  Cardiac testing  TTE 5/2020. EF 55%. No RWMA although this possibly could not be excluded on the basis of the study. LAE. Normal RV size and function. There is no clear evidence of significant pulmonary vascular disease by noninvasive assessment. PASP<40 mmHg. Notably, there is mild IVS septal bounce which may be related to HFpEF. NMR myocardial SPECT September 2021Normal study after pharmacologic vasodilation without reproduction of symptoms. Myocardial perfusion imaging was normal at rest and with stress. Left ventricular systolic function was normal.  SPECT 6.14.23 The ECG and SPECT imaging portions of the stress study are concordant with no evidence of stress induced myocardial ischemia. Event monitor 6/2023. NSR/ Avg 57 bpm. Predominant underlying rhythm was Sinus Rhythm.  Isolated SVEswere rare (<1.0%), SVE Couplets were rare (<1.0%), and SVE Triplets wererare (<1.0%). Isolated VEs were frequent (5.5%, 72[address]), VE Couplets were rare (<1.0%, 10), and no VE Triplets were present. Ventricular Trigeminy was present  TTE 10/30/23. EF 55%. Wall motion is normal.  Grade 1 DD. Mild MR. Mild TR. HPI  Tesha Mayer is a 74 yo female with hypertension, dyslipidemia, diabetes, SHAYAN, CKD 4, COPD, hypothyroidism on replacement. She is obese, BMI 30. .  Given a history of chest pain, she underwent a stress test in September of 2021 that showed no evidence of ischemia. Given CKD 4, her nephrologist has been managing her hypertension. She follows with Dr. Len Machado. She was last seen in the office October 2022      ED Adm 5/29/23  CC: chest pain. Woke up with palpitations at 3 AM, chest discomfort. Woke up at 6 AM symptoms  EKG normal sinus rhythm. PVCs. Nonspecific T wave changes  Pertinent labs: Creatinine 2.0  Recently started on antibiotics for UTI as an outpatient  Trop 7,0. WBC midly elevated  CXR: Unremarkable  /84  Discharged to follow-p with cardiology as outpatient    ED Adm 6/3/23  CC chest pain  EKG NSR, first-degree AV block  /83  WBC improved  Hs trop 5,6  cr 2.26  given IV flds  Advised outpatient cardiology follow-up    6/5/23  ER follow-up. She is accompanied by her . (PMH: HTN. HL.  CKD 4. DM 2. COPD. SHAYAN.)  ROS: In the last 5 days that she had 2 episodes of palpitations that awakened her from her sleep, prompting ED visits as above. She notes she is sedentary, given chronic knee and leg problems. She is currently taking a drug holiday off her statin therapy to see if this will improve her symptoms. She is currently off her Inspire therapy for her sleep apnea, given a mechanical fall with resultant facial injuries, about 3 months ago. Her facial injuries have improved and she is considering restarting the Inspire, which I encouraged her to do.   She does note rintermittent chest pressure. She is not certain whether this occurs with exertion. She is not certain whether this  occurs concurrently with her palpitations. We reviewed her most recent hospitalizations, and labs. I recommend she hydrate within her allotted amount up to 64 ounces, she is drinking less than this  /74  EKG: Sinus rhythm 73 bpm, first-degree AV block with frequent unifocal PVCs  Exam otherwise unremarkable  Today, I recommend she increase potassium in her diet. Her last potassium was 3.7, prior 3.8 and she is on a daily diuretic  I encouraged her to hydrate  I recommend she restart Sumit Valadez  We will obtain a pharmacological nuclear stress test as she has multiple CV risk factors: Family history, diabetes, hypertension, uncontrolled mixed dyslipidemia. She walks with a cane. I will increase her metoprolol to 50 mg every 12  We will obtain a 1 week Zio patch on the increased dose. We can assess the burden of PVCs, and assess for other arrhythmias such as A-fib given her history of sleep apnea  She will follow with a cardiologist in a short interval.  Of note, she tells me she is a "DNR". If her stress test is abnormal she will need to have a conversation with her cardiologist about next steps      Interval history  7/18/23 OV Dr Fani Rogers 8/16-8/19/23   Recurrent UTI/NIKOS on CKD stage IV   Patient had a creatinine of 4.7 (baseline 2) was told to go to the emergency room. In the ED, CT scan was completed without any abnormalities. Patient was admitted for NIKOS secondary to poor p.o. intake and UTI with Serratia. While admitted, patient was placed on IV fluids and her home torsemide and olmesartan were held in the setting of NIKOS. Nephrology was consulted   Patient was started on ertapenem for her presumed UTI. Additionally, delirium precautions were started due to altered mental status.    Infectious disease was consulted and stopped the antibiotics, stating that rather than an UTI patient was experiencing sequelae of her known urinary retention. Urology was consulted and recommended increased straight catheterization frequency with PVR monitoring while admitted and at home. Patient was seen by PT and OT with recommendations to be discharged with ambulatory PT and OT referrals. Patient's NIKOS on CKD resolved with IV fluids. Adjustments to her home medications were made including torsemide 5 mg every other day when weight exceeds 172 pounds and olmesartan was held. However, as patient remained encephalopathic, the decision was made to start her on cefpodoxime upon discharge for 5-day course of antibiotics with follow-up outpatient on 8/21/2023. Patient will also follow-up outpatient with nephrology and plans to start physical therapy    Adm 9/9-9/12/23 confusion  Evaluated by geriatrics  Recommended outpatient cognitive testing   CR was 2.5 on admission. Given IV fluids  Creatinine 2.0 at discharge    Adm 9/12-9/14/23 SOB  Rx COPD exacerbation    Adm  10/27-10/30/23 CVA  CC: severe HA, word findings difficulty/aphasia   stroke alert, s/p TNK at [facility] on 10/27/23. recent outpatient MRI on 10/26 that was negative for abnormal findings/atrophy   CTH negative, CTA without high grade stenosis of occlusion   Repeat CTH stable post TNKtPA  MRI - right putamen CVA   Echocardiogram - normal EF, G1DD   Goal BP now normotensive   ASA and Plavix x 21 days, then ASA thereafter. Zio patch for 2 weeks, per neuro. Concern for cardioembolic CVA  Hx of inspire 11/10/2021, though noncompliant and device turned off    also having episodes of confusion prior to this event. She had Seroquel in the past, but developed diarrhea. Recommending a geriatric evaluation as an outpatient. Patient was evaluated by PT/OT and will go home with home health care. Adm 11/7-11/9 23- accelerated Htn  elevated blood pressure and headache, did not receive any medication in the emergency department.   Per ED physician patient felt to be somewhat confused while she was in the emergency department-and hence requested for admission. CT head negative on admission   Neurology eval'd  Allowed permissive HTN on admission though repeat MRI brain negative,   resume BP meds and monitor   Only takes torsemide PRN for weight gain   Continue metoprolol. Advised pt and  to keep close log of BP at home and f/u outpt for further adjustment PRN       11/20/23  Hospital follow-up. She is accompanied by her   /95  Weight 163. She has not taken torsemide 5 in a long time  ROS: Expressive aphasia. Occasional palpitations it sounds like. Current antihypertensive meds: Amlodipine 5 mg daily, losartan 25 mg twice daily, metoprolol tartrate 50 mg every 12, torsemide 5 mg every other day as needed for weight above 172 pounds  We reviewed her hospital course  2-week event monitor is recommended she is agreeable  Encouraged hydration. Repeat BMP has been requested  Given her hypertriglyceridemia, recommended uptitrating icosapent ethyl              I have spent 40 minutes with Patient and family today in which greater than 50% of this time was spent in counseling/coordination of care regarding Diagnostic results, Risks and benefits of tx options, Instructions for management, Patient and family education, Importance of tx compliance, Risk factor reductions, Impressions, Documenting in the medical record, Reviewing / ordering tests, medicine, procedures   and Obtaining or reviewing history  . Assessment  Diagnoses and all orders for this visit:    Hospital discharge follow-up    Cerebrovascular accident (CVA), unspecified mechanism ([address])  -     AMB extended holter monitor; Future  -     Lipid Panel With Direct LDL; Future    Essential hypertension    Mixed hyperlipidemia  -     Icosapent Ethyl 1 g CAPS; Take 2 capsules (2 g total) by mouth 2 (two) times a day  -     Lipid Panel With Direct LDL;  Future    SHAYAN (obstructive sleep apnea)    Type 2 diabetes mellitus with stage 4 chronic kidney disease, with long-term current use of insulin ([address])          Past Medical History:   Diagnosis Date   • Actinic keratosis    • Acute cystitis without hematuria 04/28/2023   • Acute cystitis without hematuria 04/28/2023   • Acute-on-chronic kidney injury     • NIKOS (acute kidney injury) ([address]) 05/08/2020   • Allergic    • Allergic rhinitis    • Ambulatory dysfunction 10/22/2020   • AMS (altered mental status) 07/14/2020   • Anesthesia     pt reports "had to use double lumen endo tube for hiatal hernia repair/so surgeon could get to where he needed to work"   • Arthritis    • Aspiration into airway    • Michael esophagus 1993    Lot of stress with children   • Basal cell carcinoma 2007    left cheek    • BCC (basal cell carcinoma) 05/27/2021    Left Nasal tip   • Breast discharge 06/19/2023   • Breast pain 06/19/2023   • Cancer ([address])     squamous cell cancer on forhead   • Cancer ([address])     basal cell on nose    • Cholelithiasis    • Chronic kidney disease 2000, 2018    Stones, kidney disease stage 4   • Chronic pain disorder     bilat feet and joint pain on occas   • Colon polyp    • Confusion 10/30/2023   • Constipation    • COPD (chronic obstructive pulmonary disease) ([address])    • COVID-19 08/2021    recovered at home/did receive monoclonal infusion   • COVID-19 virus infection 08/16/2021   • Dental bridge present    • Depression    • Diabetes mellitus ([address])     Type 2   • Diabetic neuropathy ([address])    • Difficulty walking 2017   • Disease of thyroid gland    • Dyspnea    • Elevated liver enzymes 04/04/2023   • Epigastric abdominal pain with severe diabetic gastroparesis, status post EGD/Botox injection, 5/14 05/17/2021   • Facial abrasion, initial encounter 03/22/2023   • Fall 03/22/2023   • Family history of thyroid problem    • Fatty liver    • Fibromyalgia, primary    • Fracture of orbital floor, blow-out, right, closed, with routine healing, subsequent encounter 03/22/2023   • GERD (gastroesophageal reflux disease)    • Heart burn    • Hiatal hernia    • History of colon polyps 07/30/2021   • History of kidney stones 09/29/2020    Hx of recurrent kidney stones   • History of kidney stones 09/29/2020    Hx of recurrent kidney stones  Formatting of this note might be different from the original. Hx of recurrent kidney stones  Last Assessment & Plan:  Formatting of this note might be different from the original. We will set her up for follow-up in 3 months with a KUB prior. She knows to call if she has any kidney stone type pain in the meantime. • History of pneumonia    • History of repair of hiatal hernia 05/03/2020   • Hyperlipidemia    • Hyperplasia, parathyroid ([address])    • Hypertension    • Hypertensive urgency 10/27/2023   • Hyponatremia 04/26/2023   • Hypotension 04/13/2022   • Kidney problem    • Kidney stone    • Left hip pain 10/05/2023   • Leukocytosis 07/14/2020   • Memory loss Julu2 2020   • Motion sickness    • Motion sickness    • Multiple closed fractures of ribs of right side 03/22/2023   • Nasal bones, closed fracture 03/22/2023   • Neck pain    • Obesity 1978   • Obesity (BMI 30.0-34. 9)    • Other chest pain 09/13/2021   • Other fatigue 09/21/2023   • Peripheral neuropathy    • Pollen allergies    • RLS (restless legs syndrome)    • S/P foot surgery 07/20/2022   • SCC (squamous cell carcinoma) 05/04/2021    left mid forehead   • SCC (squamous cell carcinoma) 2023    chest   • Seasonal allergies    • Shingles 01 05 23   • Sleep apnea     has inspire   • Squamous cell skin cancer 2007    left cheek    • Swollen ankles    • 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, left     great toe fusion   • Urinary incontinence    • Urinary tract infection 03/28/2022   • Wears glasses        Review of Systems   Constitutional: Negative for chills.    Cardiovascular:  Negative for chest pain, claudication, cyanosis, dyspnea on exertion, irregular heartbeat, leg swelling, near-syncope, orthopnea, palpitations, paroxysmal nocturnal dyspnea and syncope. Respiratory:  Negative for cough and shortness of breath. Gastrointestinal:  Negative for heartburn and nausea. Neurological:  Negative for dizziness, focal weakness, headaches, light-headedness and weakness. All other systems reviewed and are negative. Allergies   Allergen Reactions   • Ciprofloxacin Hives   • Sulfamethoxazole-Trimethoprim Other (See Comments)     Tongue swelling   • Sulfamethoxazole-Trimethoprim Tongue Swelling   • Ciprofloxacin Hives and Itching     .     Current Outpatient Medications:   •  acetaminophen (TYLENOL) 325 mg tablet, Take 3 tablets (975 mg total) by mouth every 8 (eight) hours, Disp: , Rfl: 0  •  albuterol (Ventolin HFA) 90 mcg/act inhaler, Inhale 2 puffs every 6 (six) hours as needed for wheezing, Disp: 54 g, Rfl: 0  •  amLODIPine (NORVASC) 5 mg tablet, Take 1 tablet (5 mg total) by mouth daily Do not start before October 31, 2023., Disp: , Rfl: 0  •  aspirin (ECOTRIN LOW STRENGTH) 81 mg EC tablet, Take 1 tablet (81 mg total) by mouth daily Do not start before October 31, 2023., Disp: , Rfl: 0  •  B-D ULTRAFINE III SHORT PEN 31G X 8 MM MISC, , Disp: , Rfl: 3  •  Calcium Citrate 1040 MG TABS, Take 500 mg by mouth Three times a day, Disp: , Rfl:   •  cephalexin (KEFLEX) 500 mg capsule, Take 1 capsule (500 mg total) by mouth every 12 (twelve) hours, Disp: 30 capsule, Rfl: 0  •  cholecalciferol (VITAMIN D3) 1,000 units tablet, Take 2 tablets (2,000 Units total) by mouth daily, Disp: 180 tablet, Rfl: 1  •  clopidogrel (PLAVIX) 75 mg tablet, Take 1 tablet (75 mg total) by mouth daily for 21 days, Disp: 21 tablet, Rfl: 0  •  Coenzyme Q10 (CoQ10) 100 MG CAPS, Take 100 mg by mouth in the morning, Disp: , Rfl:   •  DULoxetine (CYMBALTA) 30 mg delayed release capsule, Take 1 capsule (30 mg total) by mouth daily, Disp: , Rfl: 0  •  famotidine (PEPCID) 10 mg tablet, Take 1 tablet (10 mg total) by mouth daily Do not start before September 13, 2023., Disp: 30 tablet, Rfl: 0  •  ferrous sulfate 324 MG TBEC, TAKE 1 TABLET (324 MG TOTAL) BY MOUTH EVERY OTHER DAY, Disp: 45 tablet, Rfl: 0  •  Icosapent Ethyl 1 g CAPS, Take 2 capsules (2 g total) by mouth 2 (two) times a day, Disp: 120 capsule, Rfl: 3  •  insulin aspart (NovoLOG) 100 units/mL injection, Inject under the skin 3 (three) times a day before meals 18 units, 18 units, 18 units. , Disp: , Rfl:   •  insulin detemir (Levemir FlexTouch) 100 Units/mL injection pen, Inject 50 Units under the skin every evening , Disp: , Rfl:   •  Lactobacillus (PROBIOTIC ACIDOPHILUS PO), Take 1 capsule by mouth 2 (two) times a day, Disp: , Rfl:   •  levothyroxine 125 mcg tablet, Take 1 tablet (125 mcg total) by mouth daily in the early morning Do not start before October 31, 2023., Disp: , Rfl: 0  •  losartan (COZAAR) 50 mg tablet, Take 1 tablet (50 mg total) by mouth 2 (two) times a day, Disp: 180 tablet, Rfl: 0  •  metoprolol tartrate (LOPRESSOR) 50 mg tablet, Take 1 tablet (50 mg total) by mouth every 12 (twelve) hours, Disp: 180 tablet, Rfl: 3  •  pantoprazole (PROTONIX) 40 mg tablet, Take 1 tablet (40 mg total) by mouth 2 (two) times a day, Disp: 180 tablet, Rfl: 1  •  pramipexole (MIRAPEX) 0.25 mg tablet, Take 1 tablet (0.25 mg total) by mouth daily at bedtime, Disp: 90 tablet, Rfl: 2  •  rosuvastatin (CRESTOR) 5 mg tablet, Take 1 tablet (5 mg total) by mouth daily, Disp: 90 tablet, Rfl: 3  •  torsemide (DEMADEX) 10 mg tablet, Take 0.5 tablets (5 mg total) by mouth every other day Take this medication only if your weight exceeds 172 pounds, Disp: 30 tablet, Rfl: 0    Current Facility-Administered Medications:   •  cyanocobalamin injection 1,000 mcg, 1,000 mcg, Intramuscular, Weekly, Elena Villalobos MD, 1,000 mcg at 11/20/23 1112        Social History     Socioeconomic History   • Marital status: /Civil Union     Spouse name: Not on file • Number of children: Not on file   • Years of education: Not on file   • Highest education level: Not on file   Occupational History   • Occupation: RETIRED   Tobacco Use   • Smoking status: Former     Packs/day: 2.00     Years: 10.00     Total pack years: 20.00     Types: Cigarettes     Start date: 1966     Quit date: 1976     Years since quittin.9     Passive exposure: Past   • Smokeless tobacco: Never   • Tobacco comments:     Smoked 2 pack a day   Vaping Use   • Vaping Use: Never used   Substance and Sexual Activity   • Alcohol use: Yes     Comment: 1 or 2 a year   • Drug use: No   • Sexual activity: Not Currently     Partners: Male     Birth control/protection: Abstinence, Post-menopausal, Female Sterilization     Comment: defer   Other Topics Concern   • Not on file   Social History Narrative    ** Merged History Encounter **          Social Determinants of Health     Financial Resource Strain: Low Risk  (2023)    Overall Financial Resource Strain (CARDIA)    • Difficulty of Paying Living Expenses: Not hard at all   Food Insecurity: No Food Insecurity (2023)    Hunger Vital Sign    • Worried About Running Out of Food in the Last Year: Never true    • Ran Out of Food in the Last Year: Never true   Transportation Needs: No Transportation Needs (2023)    PRAPARE - Transportation    • Lack of Transportation (Medical): No    • Lack of Transportation (Non-Medical):  No   Physical Activity: Not on file   Stress: Not on file   Social Connections: Not on file   Intimate Partner Violence: Not on file   Housing Stability: Low Risk  (2023)    Housing Stability Vital Sign    • Unable to Pay for Housing in the Last Year: No    • Number of Places Lived in the Last Year: 1    • Unstable Housing in the Last Year: No       Family History   Problem Relation Age of Onset   • Heart disease Mother    • Depression Mother    • Hypertension Mother    • COPD Mother    • Hearing loss Mother    • Anxiety disorder Mother    • Heart disease Father    • Lung cancer Father 79        Smoker    • Cancer Father         brain   • Alcohol abuse Father    • Dementia Father    • Hypertension Father    • Thyroid disease Father    • COPD Father    • Arthritis Father    • Brain cancer Father 76   • Hypertension Sister    • Diabetes Sister    • Heart disease Sister    • Thyroid disease Sister    • Cancer Sister         Lympoma, lung   • Lung cancer Sister 78   • Anxiety disorder Sister    • Migraines Sister    • Hypertension Brother    • Diabetes Brother    • Cancer Brother         Throat   • Dementia Brother    • Stroke Brother    • Hypertension Brother    • Heart disease Brother    • Diabetes Brother    • Hypertension Brother    • No Known Problems Son    • No Known Problems Son    • Brain cancer Paternal Aunt         unknown age   • Diabetes Sister    • Hypertension Sister    • Diabetes Brother    • Breast cancer Neg Hx        Physical Exam  Vitals and nursing note reviewed. Constitutional:       General: She is not in acute distress. Appearance: She is not diaphoretic. HENT:      Head: Normocephalic and atraumatic. Eyes:      Conjunctiva/sclera: Conjunctivae normal.   Cardiovascular:      Rate and Rhythm: Normal rate and regular rhythm. Pulses: Intact distal pulses. Heart sounds: Normal heart sounds. Pulmonary:      Effort: Pulmonary effort is normal.      Breath sounds: Normal breath sounds. Abdominal:      General: Bowel sounds are normal.      Palpations: Abdomen is soft. Musculoskeletal:         General: Normal range of motion. Cervical back: Normal range of motion and neck supple. Skin:     General: Skin is warm and dry. Neurological:      Mental Status: She is alert. Mental status is at baseline. Comments: Some expressive aphasia         Vitals: Blood pressure 140/95, pulse 91, height 5' 2" (1.575 m), weight 74.3 kg (163 lb 12.8 oz), SpO2 97 %.    Wt Readings from Last 3 Encounters:   11/20/23 74.3 kg (163 lb 12.8 oz)   11/20/23 76.2 kg (168 lb)   11/13/23 76.2 kg (168 lb)         Labs & Results:  Lab Results   Component Value Date    WBC 8.38 11/09/2023    HGB 11.4 (L) 11/09/2023    HCT 35.8 11/09/2023    MCV 89 11/09/2023     11/09/2023     BNP   Date Value Ref Range Status   03/24/2023 35 0 - 100 pg/mL Final     No components found for: "CHEM"  Total CK   Date Value Ref Range Status   09/09/2023 66 26 - 192 U/L Final   06/13/2023 60 26 - 192 U/L Final   05/23/2023 27 26 - 192 U/L Final     Troponin I   Date Value Ref Range Status   07/14/2020 <0.02 <=0.04 ng/mL Final     Comment:       [employer] [facility] analyzer 99% cutoff is > 0.04 ng/mL in network labs     o cTnI 99% cutoff is useful only when applied to patients in the clinical setting of myocardial ischemia   o cTnI 99% cutoff should be interpreted in the context of clinical history, ECG findings and possibly cardiac imaging to establish correct diagnosis. o cTnI 99% cutoff may be suggestive but clearly not indicative of a coronary event without the clinical setting of myocardial ischemia.     06/19/2020 <0.02 <=0.04 ng/mL Final     Comment:     Autovalidation override  [employer] Chemistry analyzer 99% cutoff is > 0.04 ng/mL in network labs     o cTnI 99% cutoff is useful only when applied to patients in the clinical setting of myocardial ischemia   o cTnI 99% cutoff should be interpreted in the context of clinical history, ECG findings and possibly cardiac imaging to establish correct diagnosis. o cTnI 99% cutoff may be suggestive but clearly not indicative of a coronary event without the clinical setting of myocardial ischemia.      05/02/2020 <0.02 <=0.04 ng/mL Final     Comment:     Autovalidation override  Siemens Chemistry analyzer 99% cutoff is > 0.04 ng/mL in network labs     o cTnI 99% cutoff is useful only when applied to patients in the clinical setting of myocardial ischemia   o cTnI 99% cutoff should be interpreted in the context of clinical history, ECG findings and possibly cardiac imaging to establish correct diagnosis. o cTnI 99% cutoff may be suggestive but clearly not indicative of a coronary event without the clinical setting of myocardial ischemia. Results for orders placed during the hospital encounter of 20    Echo complete with contrast if indicated    Narrative  [address], [address] [facility]  [phone]    Transthoracic Echocardiogram  2D, M-mode, Doppler, and Color Doppler    Study date:  03-May-2020    Patient: Angelo Aponte  MR number: [id number]  Account number: [de-identified]  : 1950  Age: 69 years  Gender: Female  Status: Inpatient  Location: Bedside  Height: 62 in  Weight: 190 lb  BP: 149/ 70 mmHg    Indications: Pulmonary Hypertension    Diagnoses: I27.0 - Primary pulmonary hypertension    Sonographer:  ISIDRO Garcia  Referring Physician:  Trudy Weems PA-C  Group:  [facility]  Interpreting Physician:  Angela Cruz MD    IMPRESSIONS:  There is no clear evidence of significant pulmonary vascular disease by noninvasive assessment. PASP<40 mmHg. Notably, there is mild IVS septal bounce which may be related to HFpEF. SUMMARY    LEFT VENTRICLE:  Systolic function was normal. Ejection fraction was estimated in the range of 55 %. Although no diagnostic regional wall motion abnormality was identified, this possibility cannot be completely excluded on the basis of this study. LEFT ATRIUM:  The atrium was dilated. MITRAL VALVE:  There was trace regurgitation. HISTORY: PRIOR HISTORY: NIKOS,SHAYAN,CKD,DM,Pulmonary HTN    PROCEDURE: The procedure was performed at the bedside. This was a routine study. The transthoracic approach was used. The study included complete 2D imaging, M-mode, complete spectral Doppler, and color Doppler. The heart rate was 62 bpm,  at the start of the study.  Echocardiographic views were limited due to lung interference. This was a technically difficult study. LEFT VENTRICLE: Size was normal. Systolic function was normal. Ejection fraction was estimated in the range of 55 %. Although no diagnostic regional wall motion abnormality was identified, this possibility cannot be completely excluded on  the basis of this study. Wall thickness was normal.    RIGHT VENTRICLE: The size was normal. Systolic function was normal.    LEFT ATRIUM: The atrium was dilated. RIGHT ATRIUM: Size was normal.    MITRAL VALVE: DOPPLER: There was no evidence for stenosis. There was trace regurgitation. AORTIC VALVE: DOPPLER: There was no evidence for stenosis. There was no regurgitation. TRICUSPID VALVE: DOPPLER: There was no evidence for stenosis. There was no significant regurgitation. PULMONIC VALVE: DOPPLER: There was no evidence for stenosis. There was no significant regurgitation. PERICARDIUM: There was no pericardial effusion. AORTA: The root exhibited normal size. SYSTEM MEASUREMENT TABLES    2D  %FS: 28.1 %  Ao Diam: 2.75 cm  EDV(Teich): 91.53 ml  EF(Teich): 54.51 %  ESV(Teich): 41.64 ml  IVSd: 1.05 cm  LA Area: 21.25 cm2  LA Diam: 3.49 cm  LVEDV MOD A4C: 98.62 ml  LVEF MOD A4C: 52.55 %  LVESV MOD A4C: 46.8 ml  LVIDd: 4.48 cm  LVIDs: 3.22 cm  LVLd A4C: 7.72 cm  LVLs A4C: 6.58 cm  LVPWd: 1.13 cm  RA Area: 11.84 cm2  RVIDd: 3.24 cm  SV MOD A4C: 51.82 ml  SV(Teich): 49.89 ml    CW  TR MaxP.75 mmHg  TR Vmax: 2.95 m/s    MM  TAPSE: 1.44 cm    PW  E': 0.08 m/s  E/E': 10.52  MV A Steven: 0.69 m/s  MV Dec [place]: 4.34 m/s2  MV DecT: 193.96 ms  MV E Steven: 0.84 m/s  MV E/A Ratio: 1.23  MV PHT: 56.25 ms  MVA By PHT: 3.91 cm2    [facility] Accredited Echocardiography [facility]    Prepared and electronically signed by    Krzysztof Colon MD  Signed [id number] 13:36:59    No results found for this or any previous visit.       This note was completed in part utilizing m-modal fluency direct voice recognition software. Grammatical errors, random word insertion, spelling mistakes, and incomplete sentences may be an occasional consequence of the system secondary to software limitations, ambient noise and hardware issues. At the time of dictation, efforts were made to edit, clarify and /or correct errors. Please read the chart carefully and recognize, using context, where substitutions have occurred.   If you have any questions or concerns about the context, text or information contained within the body of this dictation, please contact myself, the provider, for further clarification

## 2023-11-20 NOTE — PROGRESS NOTES
Name: Jordan Yen      : 1950      MRN: [id number]  Encounter Provider: Elvin Abbott MD  Encounter Date: 2023   Encounter department: [address] [place] [address] [place]     1. Behavioral and psychological symptoms of dementia [facility])  Assessment & Plan:  There is still concern for an underlying dementia process as part of a more chronic process of cognitive decline (for at least a few years) that has recently been confounded and worsened by other contributing factors including recurrent hospitalization, UTI, dehydration, NIKOS, CVA etc since a fall she had while walking the dog in March. She has deficits in multiple cognitive domains including executive function, memory, attention, abstraction, and delayed recall. Unable to update MOCA today, however historically per Neurology has scored 7 on MOCA in the outpatient setting. NeuroQuant testing showed normal hippocampal volume and enlarged ventricular system: Findings do not support hippocampal degeneration. Possible expansion of ventricular system without medial temporal lobe focused ex-vacuo process. DDx - Vascular dementia? Keep appt with geriatrics in January for further assessment   We discussed dietary and lifestyle recommendations. The importance of maintaining proper hydration with at least 6-8 glasses of 8 oz of water on daily basis and reviewed maintaining healthy sleep-wake cycle. B12 low normal/MMA elevated, continue weekly injection  TSH elevated, levothyroxine dose increased in September. Recheck TSH      2. Memory changes    3. B12 deficiency    4. Vitamin D deficiency  -     Vitamin D 25 hydroxy; Future    5. RLS (restless legs syndrome)  -     pramipexole (MIRAPEX) 0.25 mg tablet; Take 1 tablet (0.25 mg total) by mouth daily at bedtime    6. Hypothyroidism, unspecified type  -     TSH, 3rd generation with Free T4 reflex; Future    7.  History of recurrent UTIs  Assessment & Plan:  Followed closely by Urology.  reports that she recently was put on chronic suppressive therapies  Review of recent urine cx show colonization with a more chronic pattern of resistance due to persistent growth of MDR serratia marcescens CRE Enterobacter cloaca CRE which is also likely colonization. Unclear role here for chronic suppressive therapy given her renal decline, AMS, etc.  Consider an alternative diagnosis ie. Interstitial cystitis? Will discuss further with her Urologist.            Subjective      Ms. Jt Baron presents to the office today for follow-up regarding memory issues with intermittent confusion and recurrent hospitalizations. Of note, Trina's memory issues appeared to have begun in 2020 following an event of prolonged intubation with poor oxygen saturation. She was following with Neurology at the time. Earlier this year in March, patient had fall while walking dog and had subsequent facial bone fractures. Her hospital course during that time was complicated by delirium. Joel Hankins has now had a total of 10 hospitalizations and/or ED visits since that time with chief complaints of NIKOS, chest pain, COPD exacerbation, HTN emergency, headache, CVA, confusion, and UTI. She has been followed by Neurology and is planned to see Geriatrics in January for concerns regarding her progressive cognitive decline. Review of Systems   Neurological:  Positive for headaches. Negative for seizures and syncope. Psychiatric/Behavioral:  Positive for behavioral problems and confusion. Current Outpatient Medications on File Prior to Visit   Medication Sig   • acetaminophen (TYLENOL) 325 mg tablet Take 3 tablets (975 mg total) by mouth every 8 (eight) hours   • albuterol (Ventolin HFA) 90 mcg/act inhaler Inhale 2 puffs every 6 (six) hours as needed for wheezing   • amLODIPine (NORVASC) 5 mg tablet Take 1 tablet (5 mg total) by mouth daily Do not start before October 31, 2023.    • aspirin (ECOTRIN LOW STRENGTH) 81 mg EC tablet Take 1 tablet (81 mg total) by mouth daily Do not start before October 31, 2023. • B-D ULTRAFINE III SHORT PEN 31G X 8 MM MISC    • Calcium Citrate 1040 MG TABS Take 500 mg by mouth Three times a day   • cephalexin (KEFLEX) 500 mg capsule Take 1 capsule (500 mg total) by mouth every 12 (twelve) hours   • cholecalciferol (VITAMIN D3) 1,000 units tablet Take 2 tablets (2,000 Units total) by mouth daily   • clopidogrel (PLAVIX) 75 mg tablet Take 1 tablet (75 mg total) by mouth daily for 21 days   • Coenzyme Q10 (CoQ10) 100 MG CAPS Take 100 mg by mouth in the morning   • DULoxetine (CYMBALTA) 30 mg delayed release capsule Take 1 capsule (30 mg total) by mouth daily   • famotidine (PEPCID) 10 mg tablet Take 1 tablet (10 mg total) by mouth daily Do not start before September 13, 2023. • ferrous sulfate 324 MG TBEC TAKE 1 TABLET (324 MG TOTAL) BY MOUTH EVERY OTHER DAY   • insulin aspart (NovoLOG) 100 units/mL injection Inject under the skin 3 (three) times a day before meals 18 units, 18 units, 18 units. • insulin detemir (Levemir FlexTouch) 100 Units/mL injection pen Inject 50 Units under the skin every evening    • Lactobacillus (PROBIOTIC ACIDOPHILUS PO) Take 1 capsule by mouth 2 (two) times a day   • levothyroxine 125 mcg tablet Take 1 tablet (125 mcg total) by mouth daily in the early morning Do not start before October 31, 2023.    • losartan (COZAAR) 50 mg tablet Take 1 tablet (50 mg total) by mouth 2 (two) times a day   • metoprolol tartrate (LOPRESSOR) 50 mg tablet Take 1 tablet (50 mg total) by mouth every 12 (twelve) hours   • pantoprazole (PROTONIX) 40 mg tablet Take 1 tablet (40 mg total) by mouth 2 (two) times a day   • rosuvastatin (CRESTOR) 5 mg tablet Take 1 tablet (5 mg total) by mouth daily   • torsemide (DEMADEX) 10 mg tablet Take 0.5 tablets (5 mg total) by mouth every other day Take this medication only if your weight exceeds 172 pounds   • [DISCONTINUED] Icosapent Ethyl 1 g CAPS Take 1 capsule by mouth 2 (two) times a day   • [DISCONTINUED] pramipexole (MIRAPEX) 0.25 mg tablet Take 1 tablet (0.25 mg total) by mouth daily at bedtime   • Icosapent Ethyl 1 g CAPS Take 2 capsules (2 g total) by mouth 2 (two) times a day       Objective     /90 (BP Location: Left arm, Patient Position: Sitting, Cuff Size: Adult)   Pulse 64   Temp 97.9 °F (36.6 °C) (Tympanic)   Resp 14   Wt 76.2 kg (168 lb)   SpO2 96%   BMI 28.84 kg/m²     Physical Exam  Vitals reviewed. Constitutional:       General: She is not in acute distress. Appearance: She is obese. She is not ill-appearing. Cardiovascular:      Rate and Rhythm: Normal rate and regular rhythm. Pulmonary:      Effort: No respiratory distress. Breath sounds: No wheezing, rhonchi or rales. Abdominal:      General: There is no distension. Palpations: There is no mass. Tenderness: There is no abdominal tenderness. There is no guarding. Musculoskeletal:         General: No swelling. Skin:     General: Skin is warm and dry. Neurological:      Mental Status: She is alert. Mental status is at baseline.        Carolyn Scott MD

## 2023-11-20 NOTE — ASSESSMENT & PLAN NOTE
There is still concern for an underlying dementia process as part of a more chronic process of cognitive decline (for at least a few years) that has recently been confounded and worsened by other contributing factors including recurrent hospitalization, UTI, dehydration, NIKOS, CVA etc since a fall she had while walking the dog in March. She has deficits in multiple cognitive domains including executive function, memory, attention, abstraction, and delayed recall. Unable to update MOCA today, however historically per Neurology has scored 7 on MOCA in the outpatient setting. NeuroQuant testing showed normal hippocampal volume and enlarged ventricular system: Findings do not support hippocampal degeneration. Possible expansion of ventricular system without medial temporal lobe focused ex-vacuo process. DDx - Vascular dementia? Keep appt with geriatrics in January for further assessment   We discussed dietary and lifestyle recommendations. The importance of maintaining proper hydration with at least 6-8 glasses of 8 oz of water on daily basis and reviewed maintaining healthy sleep-wake cycle. B12 low normal/MMA elevated, continue weekly injection  TSH elevated, levothyroxine dose increased in September.   Recheck TSH

## 2023-11-20 NOTE — ASSESSMENT & PLAN NOTE
Followed closely by Urology.  reports that she recently was put on chronic suppressive therapies  Review of recent urine cx show colonization with a more chronic pattern of resistance due to persistent growth of MDR serratia marcescens CRE Enterobacter cloaca CRE which is also likely colonization. Unclear role here for chronic suppressive therapy given her renal decline, AMS, etc.  Consider an alternative diagnosis ie. Interstitial cystitis?  Will discuss further with her Urologist. [facility] called: physical theraphy will be continues for Kat talley. PT: once a week for one week  Twice a week for 3 weeks.     They will call us if oxygen saturation drops less than 90%    Will notify Dr Belen Bell

## 2023-11-20 NOTE — PATIENT INSTRUCTIONS
Strive for 6 (8 oz) glasses of fluids a day to stay hydrated.   Try to avoid caffeinated and sugar beverages    Go for blood work already ordered for 11/22

## 2023-11-20 NOTE — TELEPHONE ENCOUNTER
Reached out to patient in regards to Neuropsychology referral Left VM for pt to contact intake department. When pt calls back will need to be informed referral would need to come from [facility] Dept.

## 2023-11-21 ENCOUNTER — APPOINTMENT (OUTPATIENT)
Facility: CLINIC | Age: 73
End: 2023-11-21
Payer: MEDICARE

## 2023-11-22 ENCOUNTER — LAB (OUTPATIENT)
Dept: LAB | Facility: AMBULARY SURGERY CENTER | Age: 73
End: 2023-11-22
Payer: MEDICARE

## 2023-11-22 ENCOUNTER — APPOINTMENT (OUTPATIENT)
Dept: LAB | Facility: AMBULARY SURGERY CENTER | Age: 73
End: 2023-11-22
Payer: MEDICARE

## 2023-11-22 ENCOUNTER — TELEPHONE (OUTPATIENT)
Age: 73
End: 2023-11-22

## 2023-11-22 DIAGNOSIS — N18.4 STAGE 4 CHRONIC KIDNEY DISEASE (HCC): ICD-10-CM

## 2023-11-22 DIAGNOSIS — E11.21 DIABETIC NEPHROPATHY ASSOCIATED WITH TYPE 2 DIABETES MELLITUS (HCC): ICD-10-CM

## 2023-11-22 DIAGNOSIS — I12.9 PARENCHYMAL RENAL HYPERTENSION, STAGE 1 THROUGH STAGE 4 OR UNSPECIFIED CHRONIC KIDNEY DISEASE: ICD-10-CM

## 2023-11-22 DIAGNOSIS — I63.9 CEREBROVASCULAR ACCIDENT (CVA), UNSPECIFIED MECHANISM (HCC): ICD-10-CM

## 2023-11-22 DIAGNOSIS — E03.9 HYPOTHYROIDISM, UNSPECIFIED TYPE: ICD-10-CM

## 2023-11-22 DIAGNOSIS — N18.4 ANEMIA DUE TO STAGE 4 CHRONIC KIDNEY DISEASE: ICD-10-CM

## 2023-11-22 DIAGNOSIS — E78.2 MIXED HYPERLIPIDEMIA: ICD-10-CM

## 2023-11-22 DIAGNOSIS — R80.1 PERSISTENT PROTEINURIA: ICD-10-CM

## 2023-11-22 DIAGNOSIS — E55.9 VITAMIN D DEFICIENCY: ICD-10-CM

## 2023-11-22 DIAGNOSIS — N18.4 ANEMIA IN STAGE 4 CHRONIC KIDNEY DISEASE: ICD-10-CM

## 2023-11-22 DIAGNOSIS — D63.1 ANEMIA IN STAGE 4 CHRONIC KIDNEY DISEASE: ICD-10-CM

## 2023-11-22 DIAGNOSIS — R33.9 URINARY RETENTION: ICD-10-CM

## 2023-11-22 DIAGNOSIS — D63.1 ANEMIA DUE TO STAGE 4 CHRONIC KIDNEY DISEASE: ICD-10-CM

## 2023-11-22 DIAGNOSIS — E21.3 HYPERPARATHYROIDISM (HCC): ICD-10-CM

## 2023-11-22 DIAGNOSIS — N25.81 SECONDARY HYPERPARATHYROIDISM OF RENAL ORIGIN (HCC): ICD-10-CM

## 2023-11-22 DIAGNOSIS — N17.9 AKI (ACUTE KIDNEY INJURY) (HCC): ICD-10-CM

## 2023-11-22 DIAGNOSIS — N18.4 CKD (CHRONIC KIDNEY DISEASE) STAGE 4, GFR 15-29 ML/MIN (HCC): ICD-10-CM

## 2023-11-22 LAB
25(OH)D3 SERPL-MCNC: 11.2 NG/ML (ref 30–100)
ALBUMIN SERPL BCP-MCNC: 3.4 G/DL (ref 3.5–5)
ALP SERPL-CCNC: 55 U/L (ref 34–104)
ALT SERPL W P-5'-P-CCNC: 24 U/L (ref 7–52)
ANION GAP SERPL CALCULATED.3IONS-SCNC: 8 MMOL/L
AST SERPL W P-5'-P-CCNC: 21 U/L (ref 13–39)
BASOPHILS # BLD AUTO: 0.04 THOUSANDS/ÂΜL (ref 0–0.1)
BASOPHILS NFR BLD AUTO: 0 % (ref 0–1)
BILIRUB SERPL-MCNC: 0.35 MG/DL (ref 0.2–1)
BUN SERPL-MCNC: 32 MG/DL (ref 5–25)
CALCIUM ALBUM COR SERPL-MCNC: 9.5 MG/DL (ref 8.3–10.1)
CALCIUM SERPL-MCNC: 9 MG/DL (ref 8.4–10.2)
CHLORIDE SERPL-SCNC: 99 MMOL/L (ref 96–108)
CHOLEST SERPL-MCNC: 141 MG/DL
CO2 SERPL-SCNC: 31 MMOL/L (ref 21–32)
CREAT SERPL-MCNC: 2.94 MG/DL (ref 0.6–1.3)
CREAT UR-MCNC: 128.6 MG/DL
EOSINOPHIL # BLD AUTO: 0.27 THOUSAND/ÂΜL (ref 0–0.61)
EOSINOPHIL NFR BLD AUTO: 3 % (ref 0–6)
ERYTHROCYTE [DISTWIDTH] IN BLOOD BY AUTOMATED COUNT: 14.6 % (ref 11.6–15.1)
FERRITIN SERPL-MCNC: 14 NG/ML (ref 11–307)
GFR SERPL CREATININE-BSD FRML MDRD: 15 ML/MIN/1.73SQ M
GLUCOSE P FAST SERPL-MCNC: 161 MG/DL (ref 65–99)
HCT VFR BLD AUTO: 37.7 % (ref 34.8–46.1)
HDLC SERPL-MCNC: 41 MG/DL
HGB BLD-MCNC: 12.1 G/DL (ref 11.5–15.4)
IMM GRANULOCYTES # BLD AUTO: 0.03 THOUSAND/UL (ref 0–0.2)
IMM GRANULOCYTES NFR BLD AUTO: 0 % (ref 0–2)
IRON SATN MFR SERPL: 17 % (ref 15–50)
IRON SERPL-MCNC: 58 UG/DL (ref 50–212)
LDLC SERPL DIRECT ASSAY-MCNC: 28 MG/DL (ref 0–100)
LYMPHOCYTES # BLD AUTO: 2.27 THOUSANDS/ÂΜL (ref 0.6–4.47)
LYMPHOCYTES NFR BLD AUTO: 22 % (ref 14–44)
MAGNESIUM SERPL-MCNC: 1.8 MG/DL (ref 1.9–2.7)
MCH RBC QN AUTO: 29.9 PG (ref 26.8–34.3)
MCHC RBC AUTO-ENTMCNC: 32.1 G/DL (ref 31.4–37.4)
MCV RBC AUTO: 93 FL (ref 82–98)
MONOCYTES # BLD AUTO: 0.55 THOUSAND/ÂΜL (ref 0.17–1.22)
MONOCYTES NFR BLD AUTO: 5 % (ref 4–12)
NEUTROPHILS # BLD AUTO: 7.03 THOUSANDS/ÂΜL (ref 1.85–7.62)
NEUTS SEG NFR BLD AUTO: 70 % (ref 43–75)
NRBC BLD AUTO-RTO: 0 /100 WBCS
PHOSPHATE SERPL-MCNC: 3.4 MG/DL (ref 2.3–4.1)
PLATELET # BLD AUTO: 297 THOUSANDS/UL (ref 149–390)
PMV BLD AUTO: 12 FL (ref 8.9–12.7)
POTASSIUM SERPL-SCNC: 3.9 MMOL/L (ref 3.5–5.3)
PROT SERPL-MCNC: 6 G/DL (ref 6.4–8.4)
PROT UR-MCNC: 911 MG/DL
PROT/CREAT UR: 7.08 MG/G{CREAT} (ref 0–0.1)
PTH-INTACT SERPL-MCNC: 45.4 PG/ML (ref 12–88)
RBC # BLD AUTO: 4.05 MILLION/UL (ref 3.81–5.12)
SODIUM SERPL-SCNC: 138 MMOL/L (ref 135–147)
T4 FREE SERPL-MCNC: 0.57 NG/DL (ref 0.61–1.12)
TIBC SERPL-MCNC: 346 UG/DL (ref 250–450)
TRIGL SERPL-MCNC: 490 MG/DL
TSH SERPL DL<=0.05 MIU/L-ACNC: 15.2 UIU/ML (ref 0.45–4.5)
UIBC SERPL-MCNC: 288 UG/DL (ref 155–355)
WBC # BLD AUTO: 10.19 THOUSAND/UL (ref 4.31–10.16)

## 2023-11-22 PROCEDURE — 82728 ASSAY OF FERRITIN: CPT

## 2023-11-22 PROCEDURE — 84443 ASSAY THYROID STIM HORMONE: CPT

## 2023-11-22 PROCEDURE — 85025 COMPLETE CBC W/AUTO DIFF WBC: CPT

## 2023-11-22 PROCEDURE — 80053 COMPREHEN METABOLIC PANEL: CPT

## 2023-11-22 PROCEDURE — 82306 VITAMIN D 25 HYDROXY: CPT

## 2023-11-22 PROCEDURE — 80061 LIPID PANEL: CPT

## 2023-11-22 PROCEDURE — 83540 ASSAY OF IRON: CPT

## 2023-11-22 PROCEDURE — 83721 ASSAY OF BLOOD LIPOPROTEIN: CPT

## 2023-11-22 PROCEDURE — 83735 ASSAY OF MAGNESIUM: CPT

## 2023-11-22 PROCEDURE — 83550 IRON BINDING TEST: CPT

## 2023-11-22 PROCEDURE — 82570 ASSAY OF URINE CREATININE: CPT

## 2023-11-22 PROCEDURE — 84156 ASSAY OF PROTEIN URINE: CPT

## 2023-11-22 PROCEDURE — 36415 COLL VENOUS BLD VENIPUNCTURE: CPT

## 2023-11-22 PROCEDURE — 83970 ASSAY OF PARATHORMONE: CPT

## 2023-11-22 PROCEDURE — 84100 ASSAY OF PHOSPHORUS: CPT

## 2023-11-22 PROCEDURE — 84439 ASSAY OF FREE THYROXINE: CPT

## 2023-11-22 RX ORDER — ICOSAPENT ETHYL 1000 MG/1
2 CAPSULE ORAL 2 TIMES DAILY
Qty: 120 CAPSULE | Refills: 3 | Status: SHIPPED | OUTPATIENT
Start: 2023-11-22

## 2023-11-22 RX ORDER — ICOSAPENT ETHYL 1000 MG/1
2 CAPSULE ORAL 2 TIMES DAILY
Qty: 120 CAPSULE | Refills: 3 | OUTPATIENT
Start: 2023-11-22

## 2023-11-22 NOTE — TELEPHONE ENCOUNTER
Called patient l/m to call back regarding tracy's message. If patient calls back please let her know and schedule her for a follow up     Patient should estrace therapy 3x weekly. Stop continuous keflex antibiotic. Please coordinate follow up for reassessment.

## 2023-11-22 NOTE — TELEPHONE ENCOUNTER
Received communication from patient's PCP. Jeff Francis was prescribed keflex 250mg QHS suppression for 30 days. It appears somehow this has transitioned to continuous prophylaxis which should be discontinued. Patient should estrace therapy 3x weekly. Stop continuous antibiotic. Please coordinate follow up for reassessment.

## 2023-11-22 NOTE — TELEPHONE ENCOUNTER
Patient's spouse Letty Machado called today requesting a call back questioning why the patient's Rx was increased? He states that the patient was previously on Cephalexin 250 mg twice a day. But was newly prescribed Cephalexin 500 mg twice a day.     Call Back [phone]

## 2023-11-22 NOTE — TELEPHONE ENCOUNTER
Patients  was returning vm he received, writer explained the neuropsychology referral needs to come from a [employer]

## 2023-11-24 ENCOUNTER — APPOINTMENT (OUTPATIENT)
Facility: CLINIC | Age: 73
End: 2023-11-24
Payer: MEDICARE

## 2023-11-24 NOTE — TELEPHONE ENCOUNTER
S/W Mason Cantor,  of the patient, he was returning a call to the office. The message was relayed from the encounters. He wants to discuss with a nurse stopping the antibiotic. He states it is the only thing stopping his wife from getting a UTI.      Requesting a call back on his number:    Mason Cantor  [phone]

## 2023-11-24 NOTE — TELEPHONE ENCOUNTER
2nd attempt made   Called patient l/m to call back regarding tracy's message. If patient calls back please let her know and schedule her for a follow up      Patient should estrace therapy 3x weekly. Stop continuous keflex antibiotic. Please coordinate follow up for reassessment.

## 2023-11-27 ENCOUNTER — TELEPHONE (OUTPATIENT)
Dept: NEPHROLOGY | Facility: CLINIC | Age: 73
End: 2023-11-27

## 2023-11-27 ENCOUNTER — CLINICAL SUPPORT (OUTPATIENT)
Dept: INTERNAL MEDICINE CLINIC | Facility: CLINIC | Age: 73
End: 2023-11-27
Payer: MEDICARE

## 2023-11-27 DIAGNOSIS — E53.8 B12 DEFICIENCY: Primary | ICD-10-CM

## 2023-11-27 DIAGNOSIS — E03.9 HYPOTHYROIDISM, UNSPECIFIED TYPE: ICD-10-CM

## 2023-11-27 DIAGNOSIS — N18.4 CKD (CHRONIC KIDNEY DISEASE) STAGE 4, GFR 15-29 ML/MIN (HCC): Primary | ICD-10-CM

## 2023-11-27 DIAGNOSIS — E55.9 VITAMIN D DEFICIENCY: Primary | ICD-10-CM

## 2023-11-27 PROCEDURE — 96372 THER/PROPH/DIAG INJ SC/IM: CPT

## 2023-11-27 RX ORDER — ERGOCALCIFEROL 1.25 MG/1
50000 CAPSULE ORAL WEEKLY
Qty: 8 CAPSULE | Refills: 0 | Status: SHIPPED | OUTPATIENT
Start: 2023-11-27 | End: 2024-01-16

## 2023-11-27 RX ADMIN — CYANOCOBALAMIN 1000 MCG: 1000 INJECTION, SOLUTION INTRAMUSCULAR; SUBCUTANEOUS at 09:48

## 2023-11-27 NOTE — TELEPHONE ENCOUNTER
Spoke with patient's , Mason Cantor, about the following, he expressed understanding and thanked us for the call:    ----- Message from Glenn Marinelli MD sent at 11/22/2023  4:13 PM EST -----  1. Ergocalciferol 50,000 units weekly for 12 weeks and 2000 units daily over-the-counter I think I already gave that order  2. Creatinine just slightly higher I recommend follow-up basic metabolic profile in about 2 weeks  3.   Blood pressures at this time for 1 week  ----- Message -----  From: Lab, Background User  Sent: 11/22/2023  12:53 PM EST  To: Glenn Marinelli MD

## 2023-11-27 NOTE — TELEPHONE ENCOUNTER
Patient's  calling to see if there is another medication that she can take to prevent UTIs because otherwise she gets them at-least once a month      Patient's  can be reached at [phone]

## 2023-11-28 ENCOUNTER — OFFICE VISIT (OUTPATIENT)
Facility: CLINIC | Age: 73
End: 2023-11-28
Payer: MEDICARE

## 2023-11-28 DIAGNOSIS — R41.3 MEMORY DIFFICULTIES: Primary | ICD-10-CM

## 2023-11-28 PROCEDURE — 97530 THERAPEUTIC ACTIVITIES: CPT

## 2023-11-28 NOTE — PROGRESS NOTES
Daily Note     Today's date: 2023  Patient name: Julio Barfield  : 1950  MRN: [id number]  Referring provider: Bryan Koroma PA-C  Dx:   Encounter Diagnosis     ICD-10-CM    1. Memory difficulties  R41.3         Start Time: 8489  Stop Time: 1100  Total time in clinic (min): 45 minutes    PLAN OF CARE START:23  PLAN OF CARE END: 2023  FREQUENCY: 2 times a week  PRECAUTIONS Memory deficits, FALL RISK    Subjective: Pt , Flaquito Otto, reports she has had a good week and has not been in the hospital in 2 weeks. Pt to see ophthalmologist next week. Objective: See treatment diary below      TA:   -pt completed BCAT Visual Cancellation task with 50% error. Task completed to improve attention.   -Pt completed BCAT Number-Symbol decoding task. Pt required min cues to recall directions of task however demo increased ability to follow directions and attend to task.   -Completed Spot it activity; pt demo difficulty with concentration. Task completed to improve  skills and attention.   -Pt completed TM Part A style task with letters, Pt asked to don pom pom onto letters in ascending order and name food that starts with that letter. Pt demo difficulty with recall and attention. Assessment: Tolerated treatment fair. Pt demo difficulty with following cues and directions although improved since last time this OT treated Chela Forward. Pt would benefit from continued OT. Plan: Continue per plan of care. NEW GOALS ADDED 10/4/23:   Short Term:  Pt will increase attention to 1 task for improved work performance and engagement in salient tasks 4 weeks. Pt will increase oculomotor control for improved saccades, con/divergent tasks for improved reading, board to table tasks in 4 weeks. Pt will increase verbal and written direction following with processing time of <2 min and 50% accuracy in 4 weeks.   Pt will demo with improved visual perceptual skills x 50% for improved accuracy of visual discrimintation and spatial relationship tasks 4-6 weeks. Long - Term:   Pt will increase attention to 2  tasks for improved work performance and engagement in salient tasks. Pt will increase verbal and written direction following with processing time of <2 min and 75% accuracy in 4 weeks. Pt will demo with improved visual perceptual skills x 75% for improved accuracy of visual discrimintation and spatial relationship tasks 4-6 weeks.

## 2023-11-29 NOTE — TELEPHONE ENCOUNTER
Patient called back for RN who just called him a few minutes ago. I told him that I would let her know to return his call. He has questions about the medication she suggested.     CB:

## 2023-11-30 DIAGNOSIS — N39.0 RECURRENT UTI: Primary | ICD-10-CM

## 2023-11-30 RX ORDER — METHENAMINE HIPPURATE 1000 MG/1
1 TABLET ORAL 2 TIMES DAILY WITH MEALS
Qty: 60 TABLET | Refills: 6 | Status: SHIPPED | OUTPATIENT
Start: 2023-11-30

## 2023-12-04 ENCOUNTER — TELEPHONE (OUTPATIENT)
Dept: NEUROLOGY | Facility: CLINIC | Age: 73
End: 2023-12-04

## 2023-12-04 ENCOUNTER — CLINICAL SUPPORT (OUTPATIENT)
Dept: INTERNAL MEDICINE CLINIC | Facility: CLINIC | Age: 73
End: 2023-12-04
Payer: MEDICARE

## 2023-12-04 DIAGNOSIS — E53.8 VITAMIN B12 DEFICIENCY: Primary | ICD-10-CM

## 2023-12-04 PROCEDURE — 96372 THER/PROPH/DIAG INJ SC/IM: CPT | Performed by: INTERNAL MEDICINE

## 2023-12-04 RX ADMIN — CYANOCOBALAMIN 1000 MCG: 1000 INJECTION, SOLUTION INTRAMUSCULAR; SUBCUTANEOUS at 09:53

## 2023-12-05 ENCOUNTER — DOCUMENTATION (OUTPATIENT)
Dept: NEPHROLOGY | Facility: CLINIC | Age: 73
End: 2023-12-05

## 2023-12-05 DIAGNOSIS — N25.81 SECONDARY HYPERPARATHYROIDISM OF RENAL ORIGIN (HCC): ICD-10-CM

## 2023-12-05 DIAGNOSIS — I12.9 PARENCHYMAL RENAL HYPERTENSION, STAGE 1 THROUGH STAGE 4 OR UNSPECIFIED CHRONIC KIDNEY DISEASE: ICD-10-CM

## 2023-12-05 DIAGNOSIS — I10 ESSENTIAL HYPERTENSION: Primary | ICD-10-CM

## 2023-12-05 RX ORDER — HYDRALAZINE HYDROCHLORIDE 25 MG/1
25 TABLET, FILM COATED ORAL 2 TIMES DAILY
Qty: 60 TABLET | Refills: 5 | Status: SHIPPED | OUTPATIENT
Start: 2023-12-05

## 2023-12-05 NOTE — TELEPHONE ENCOUNTER
Spoke with patient's , Henry Bonilla, about the following, he expressed understanding and thanked us for the call:    Recommendations:  1. I will place the patient on hydralazine 25 mg twice a day  2.   Repeat blood pressures in about 3 to 4 weeks

## 2023-12-06 ENCOUNTER — CLINICAL SUPPORT (OUTPATIENT)
Dept: CARDIOLOGY CLINIC | Facility: CLINIC | Age: 73
End: 2023-12-06
Payer: MEDICARE

## 2023-12-06 DIAGNOSIS — I63.9 CEREBROVASCULAR ACCIDENT (CVA), UNSPECIFIED MECHANISM (HCC): ICD-10-CM

## 2023-12-06 PROCEDURE — 93248 EXT ECG>7D<15D REV&INTERPJ: CPT | Performed by: INTERNAL MEDICINE

## 2023-12-11 ENCOUNTER — CLINICAL SUPPORT (OUTPATIENT)
Dept: INTERNAL MEDICINE CLINIC | Facility: CLINIC | Age: 73
End: 2023-12-11
Payer: MEDICARE

## 2023-12-11 ENCOUNTER — OFFICE VISIT (OUTPATIENT)
Dept: NEUROLOGY | Facility: CLINIC | Age: 73
End: 2023-12-11
Payer: MEDICARE

## 2023-12-11 VITALS
SYSTOLIC BLOOD PRESSURE: 130 MMHG | TEMPERATURE: 98.1 F | HEIGHT: 62 IN | BODY MASS INDEX: 30.77 KG/M2 | HEART RATE: 64 BPM | OXYGEN SATURATION: 96 % | DIASTOLIC BLOOD PRESSURE: 74 MMHG | WEIGHT: 167.2 LBS | RESPIRATION RATE: 18 BRPM

## 2023-12-11 DIAGNOSIS — I63.9 CEREBROVASCULAR ACCIDENT (CVA), UNSPECIFIED MECHANISM (HCC): ICD-10-CM

## 2023-12-11 DIAGNOSIS — R41.89 COGNITIVE IMPAIRMENT: Primary | ICD-10-CM

## 2023-12-11 DIAGNOSIS — F03.918 BEHAVIORAL AND PSYCHOLOGICAL SYMPTOMS OF DEMENTIA (HCC): ICD-10-CM

## 2023-12-11 DIAGNOSIS — E53.8 B12 DEFICIENCY: Primary | ICD-10-CM

## 2023-12-11 PROCEDURE — 96372 THER/PROPH/DIAG INJ SC/IM: CPT | Performed by: INTERNAL MEDICINE

## 2023-12-11 PROCEDURE — 99215 OFFICE O/P EST HI 40 MIN: CPT | Performed by: PSYCHIATRY & NEUROLOGY

## 2023-12-11 RX ADMIN — CYANOCOBALAMIN 1000 MCG: 1000 INJECTION, SOLUTION INTRAMUSCULAR; SUBCUTANEOUS at 13:35

## 2023-12-11 NOTE — PROGRESS NOTES
Patient ID: Meghna Coker is a 75 y.o. female. Assessment/Plan:    Behavioral and psychological symptoms of dementia ([address])  Francesca Lora is a very pleasant 67-year-old female with a complex medical history who presents for follow-up of memory. I last saw her on 10/10/23. Her symptoms with her short-term memory started in 2020 after a prolonged intubation with poor O2 saturations. MoCA then 23/30. Patient suffered a fall in March of this year and hit her head while walking the dog. When I saw her in October for follow-up her MoCA had decreased to 7/30. I did an MRI neuro quant which showed normal hippocampal volume and an enlarged ventricular system. Since I last saw her unfortunately patient suffered a stroke, likely related to uncontrolled hypertension. She has been participating with cognitive therapy. Her MoCA today improved to 12/30. At this time, patient's cognitive disturbances remain unclear. Her cognitive screening still remains within the dementia range. MRI not supporting hippocampal volume loss seen in Alzheimer's. Patient does have a lot of metabolic disturbances including TSH elevations, vitamin D deficiency, vitamin B-12 deficiency. Patient is also not compliant with her inspire device. It is difficult to get an accurate assessment when there are multiple underlying metabolic disturbances, vascular disturbances and frequent infections. Today I encouraged compliance with inspire device. I encouraged follow-up with Senior care. I counseled her on staying cognitively, socially and physically engaged. Cerebrovascular accident (CVA) [facility])  Patient was seen in the hospital on 10/27/2023 due to a severe headache with word finding difficulty/aphasia. BP on presentation 185/92. NIHSS 14 Patient was given TNK. Patient underwent stroke workup. CT head negative CTA negative. MRI showed a foci of late acute infarct involving the right putamen.   She had had an MRI on 10/26 which was negative for stroke. Echocardiogram with normal ejection fraction and atrial size. Patient was placed on dual antiplatelet for 21 days then aspirin monotherapy. She had outpatient ambulatory cardiac monitoring which did not report any atrial fibrillation. LDL 28. HbA1c 7.1    At this time, stroke likely secondary to uncontrolled hypertension. Blood pressure today 130/74. She denies any residual deficits from the stroke. I counseled her on inspire device compliance as untreated SHAYAN is a risk factor for stroke. She is to continue on aspirin monotherapy and closely monitor blood pressure. Cholesterol and diabetes management as per PCP       Diagnoses and all orders for this visit:    Cognitive impairment    Behavioral and psychological symptoms of dementia ([address])    Cerebrovascular accident (CVA), unspecified mechanism ([address])           Subjective:    GARLAND Garrison is a very pleasant 79-year-old female with a complex medical history who presents for follow-up of memory. I last saw her on 10/10/23. To review, I initially met her in 2020 for short-term memory difficulties after a prolonged intubation with poor O2 saturations. MoCA at that time 23/30. MRI was ordered and there were concerns for NPH however on further evaluation it was thought to be more consistent with atrophy due to normal aging. When I last saw her in 2021 her memory remained stable. I    During our last visit, they report that she has had a rapid decline since March of this year after she fell and hit her head while walking their dog. She had gone to the emergency department and imaging showed fracture of facial bones however no hemorrhage on CT head. Patient has significant confusion, difficulty with comprehension requiring repetition and assistance with activities of daily living. Other symptoms involves ambulatory difficulties and urinary/bowel incontinence. MoCA then 7/30.     Given concern for progressive dementia I ordered an MRI neuro Jewell Kaelyn done on 10/26/2023:     No mass effect, acute intracranial hemorrhage or evidence of recent infarction. Mild chronic microvascular ischemic change. NeuroQuant analysis was performed: Normal hippocampal volume and enlarged ventricular system: Findings do not support hippocampal degeneration. Possible expansion of ventricular system without medial temporal lobe focused ex-vacuo process. Interval history   Patient was seen in the hospital on 10/27/2023 due to a severe headache with word finding difficulty/aphasia. NIHSS 14 Patient was given TNK. Patient underwent stroke workup. CT head negative CTA negative. MRI showed a foci of late acute infarct involving the right putamen. She had had an MRI on 1026 which was negative for stroke. Echocardiogram with normal ejection fraction and atrial size. Patient was placed on dual antiplatelet for 21 days then aspirin monotherapy. She had outpatient ambulatory cardiac monitoring which did not report any atrial fibrillation. LDL 28. HbA1c 7.1      Patient was again seen at the hospital on 11/1/2023 after she was driving home from a restaurant when she suddenly had a severe right-sided headache that did not subside. CT head was normal and she was discharged with precautions. Patient again presented to the hospital on 11/7/2023 and was admitted to 11/9/2023. At that time she presented with complaints of elevated blood pressure and headache. In the emergency department patient was noted to be confused therefore she was admitted. MRI brain was repeated on 11/8/2023. This did not show any acute infarction. Only stable moderate cerebral chronic microangiopathic changes when comparing to prior study.       Interval history:    Since bety has been doing better,  believes she is drinking more water  BP today 130/74  No residual deficits from stroke  Maintaining social active   [facility] when it is not cold outside   adjust for the most part  TSH elevated adjusted levo   Vit D 11.2, on weekly supplementation  B 12 373 on 1000 mcg injection  SHAYAN, has an inspire, she does not use        The following portions of the patient's history were reviewed and updated as appropriate: allergies, current medications, past family history, past medical history, past social history, past surgical history, and problem list.         Objective:    Blood pressure 130/74, pulse 64, temperature 98.1 °F (36.7 °C), temperature source Temporal, resp. rate 18, height 5' 2" (1.575 m), weight 75.8 kg (167 lb 3.2 oz), SpO2 96%. Physical Exam    Neurological Exam  Mental Status    Solomon Cognitive Assessment Exam:    Visuospatial/executive function   1/5  Identification   3/3  Attention        Digits   1/2       Letters   0/1       Serial 7s   0/3  Language          Repetition   0/2       Fluency   0/1  Abstraction   2/2  Delayed recall   0/5  Orientation   5/6  Total   12/30      . ROS:    Review of Systems      Review of Systems   Constitutional:  Negative for appetite change, fatigue and fever. HENT: Negative. Negative for hearing loss, tinnitus, trouble swallowing and voice change. Eyes: Negative. Negative for photophobia, pain and visual disturbance. Respiratory: Negative. Negative for shortness of breath. Cardiovascular: Negative. Negative for palpitations. Gastrointestinal: Negative. Negative for nausea and vomiting. Endocrine: Negative. Negative for cold intolerance. Genitourinary: Negative. Negative for dysuria, frequency and urgency. Musculoskeletal:  Negative for back pain, gait problem, myalgias and neck pain. Skin: Negative. Negative for rash. Allergic/Immunologic: Negative. Neurological: Negative. Negative for dizziness, tremors, seizures, syncope, facial asymmetry, speech difficulty, weakness, light-headedness, numbness and headaches. Hematological: Negative. Does not bruise/bleed easily.    Psychiatric/Behavioral: Negative. Negative for confusion, hallucinations and sleep disturbance. All other systems reviewed and are negative.

## 2023-12-11 NOTE — PATIENT INSTRUCTIONS
Plan:  Compliance with inspire device   Continue daily aspirin 81 mg   Continue BP management   Continue B 12 supplementation   Continue D supplementation   Increase mental engagement   Follow up with senior care      A study published in [facility] found that people with mild memory problems who did web-based crossword puzzles showed improvement in cognition and experienced less brain shrinkage, compared to those who played web-based cognitive games. In individuals with mild cognitive impairment and in those aging normally, the brain tends to shrink. So, the question to ask about brain volume is whether an intervention like medication or crossword puzzles can slow the shrinkage. Two common structures evaluated in this context are the size of the hippocampus, which remembers the episodes of your life, and the thickness of the cortex, which is where your thinking occurs. When compared to playing online cognitive games, working on online crossword puzzles resulted in between 0.5% and 1% less shrinkage in both the hippocampus and the cortex over the course of the 18-month study. This is an impressive difference. A look at the study protocol reveals that the participants were asked to work on crossword puzzles four times a week, for 30 minutes per session. The crossword puzzles were designed to be moderately difficult, equivalent to a Thursday New [place] Times crossword puzzle (NYT crossword puzzles on Friday and Saturday are usually the hardest)    Why are crossword puzzles beneficial?  There are several reasons why working on crossword puzzles in daily life could improve your thinking and memory, and even slow the shrinking of your brain. First, doing crossword puzzles is difficult, and many studies have shown that performing moderately difficult cognitive tasks is helpful for cognitive and brain health.   Second, a well-designed crossword puzzle will engage multiple brain regions in your search for the right word. Moreover, crossword puzzle clues often force you to link concepts you hadn't pictured together. These features mean that crossword puzzles cause large areas of your cortex to be active, and stimulate new connections in your brain. The hippocampus will then remember those new connections, strengthening both your hippocampus and cortex. Lastly, crossword puzzles are can be a social activity. One person can read out the clues, and others chime in with their best answers. Social activities have been linked to better connectivity between different parts of the brain. Try downloading the NYT earl to have access to the daily puzzle as well as other games to challenge your thinking.    Monthly fee ~$5.00

## 2023-12-12 ENCOUNTER — APPOINTMENT (OUTPATIENT)
Dept: LAB | Facility: AMBULARY SURGERY CENTER | Age: 73
End: 2023-12-12
Payer: MEDICARE

## 2023-12-12 ENCOUNTER — TELEPHONE (OUTPATIENT)
Dept: NEPHROLOGY | Facility: CLINIC | Age: 73
End: 2023-12-12

## 2023-12-12 DIAGNOSIS — N18.4 CKD (CHRONIC KIDNEY DISEASE) STAGE 4, GFR 15-29 ML/MIN (HCC): Primary | ICD-10-CM

## 2023-12-12 DIAGNOSIS — N18.4 CKD (CHRONIC KIDNEY DISEASE) STAGE 4, GFR 15-29 ML/MIN (HCC): ICD-10-CM

## 2023-12-12 LAB
ANION GAP SERPL CALCULATED.3IONS-SCNC: 9 MMOL/L
BUN SERPL-MCNC: 28 MG/DL (ref 5–25)
CALCIUM SERPL-MCNC: 8.8 MG/DL (ref 8.4–10.2)
CHLORIDE SERPL-SCNC: 103 MMOL/L (ref 96–108)
CO2 SERPL-SCNC: 26 MMOL/L (ref 21–32)
CREAT SERPL-MCNC: 2.78 MG/DL (ref 0.6–1.3)
GFR SERPL CREATININE-BSD FRML MDRD: 16 ML/MIN/1.73SQ M
GLUCOSE SERPL-MCNC: 152 MG/DL (ref 65–140)
POTASSIUM SERPL-SCNC: 3.8 MMOL/L (ref 3.5–5.3)
SODIUM SERPL-SCNC: 138 MMOL/L (ref 135–147)

## 2023-12-12 PROCEDURE — 36415 COLL VENOUS BLD VENIPUNCTURE: CPT

## 2023-12-12 PROCEDURE — 80048 BASIC METABOLIC PNL TOTAL CA: CPT

## 2023-12-12 NOTE — TELEPHONE ENCOUNTER
----- Message from De Urbina MD sent at 12/12/2023  1:46 PM EST -----  Labs are better repeat a basic metabolic profile in 1 month  ----- Message -----  From: Lab, Background User  Sent: 12/12/2023   1:12 PM EST  To: De Urbina MD

## 2023-12-14 NOTE — ASSESSMENT & PLAN NOTE
Barak Ruiz is a very pleasant 34-year-old female with a complex medical history who presents for follow-up of memory. I last saw her on 10/10/23. Her symptoms with her short-term memory started in 2020 after a prolonged intubation with poor O2 saturations. MoCA then 23/30. Patient suffered a fall in March of this year and hit her head while walking the dog. When I saw her in October for follow-up her MoCA had decreased to 7/30. I did an MRI neuro quant which showed normal hippocampal volume and an enlarged ventricular system. Since I last saw her unfortunately patient suffered a stroke, likely related to uncontrolled hypertension. She has been participating with cognitive therapy. Her MoCA today improved to 12/30. At this time, patient's cognitive disturbances remain unclear. Her cognitive screening still remains within the dementia range. MRI not supporting hippocampal volume loss seen in Alzheimer's. Patient does have a lot of metabolic disturbances including TSH elevations, vitamin D deficiency, vitamin B-12 deficiency. Patient is also not compliant with her inspire device. It is difficult to get an accurate assessment when there are multiple underlying metabolic disturbances, vascular disturbances and frequent infections. Today I encouraged compliance with inspire device. I encouraged follow-up with Senior care. I counseled her on staying cognitively, socially and physically engaged.

## 2023-12-14 NOTE — ASSESSMENT & PLAN NOTE
Patient was seen in the hospital on 10/27/2023 due to a severe headache with word finding difficulty/aphasia. BP on presentation 185/92. NIHSS 14 Patient was given TNK. Patient underwent stroke workup. CT head negative CTA negative. MRI showed a foci of late acute infarct involving the right putamen. She had had an MRI on 10/26 which was negative for stroke. Echocardiogram with normal ejection fraction and atrial size. Patient was placed on dual antiplatelet for 21 days then aspirin monotherapy. She had outpatient ambulatory cardiac monitoring which did not report any atrial fibrillation. LDL 28. HbA1c 7.1    At this time, stroke likely secondary to uncontrolled hypertension. Blood pressure today 130/74. She denies any residual deficits from the stroke. I counseled her on inspire device compliance as untreated SHAYAN is a risk factor for stroke. She is to continue on aspirin monotherapy and closely monitor blood pressure.   Cholesterol and diabetes management as per PCP

## 2023-12-18 ENCOUNTER — OFFICE VISIT (OUTPATIENT)
Dept: INTERNAL MEDICINE CLINIC | Facility: CLINIC | Age: 73
End: 2023-12-18
Payer: MEDICARE

## 2023-12-18 VITALS
WEIGHT: 170 LBS | DIASTOLIC BLOOD PRESSURE: 80 MMHG | HEART RATE: 60 BPM | OXYGEN SATURATION: 97 % | SYSTOLIC BLOOD PRESSURE: 140 MMHG | RESPIRATION RATE: 14 BRPM | BODY MASS INDEX: 31.09 KG/M2 | TEMPERATURE: 98.7 F

## 2023-12-18 DIAGNOSIS — J30.2 SEASONAL ALLERGIES: ICD-10-CM

## 2023-12-18 DIAGNOSIS — E03.9 HYPOTHYROIDISM, UNSPECIFIED TYPE: ICD-10-CM

## 2023-12-18 DIAGNOSIS — K59.09 CHRONIC CONSTIPATION: ICD-10-CM

## 2023-12-18 DIAGNOSIS — E53.8 B12 DEFICIENCY: Primary | ICD-10-CM

## 2023-12-18 DIAGNOSIS — N18.4 TYPE 2 DIABETES MELLITUS WITH STAGE 4 CHRONIC KIDNEY DISEASE, WITH LONG-TERM CURRENT USE OF INSULIN (HCC): ICD-10-CM

## 2023-12-18 DIAGNOSIS — E55.9 VITAMIN D DEFICIENCY: ICD-10-CM

## 2023-12-18 DIAGNOSIS — E11.22 TYPE 2 DIABETES MELLITUS WITH STAGE 4 CHRONIC KIDNEY DISEASE, WITH LONG-TERM CURRENT USE OF INSULIN (HCC): ICD-10-CM

## 2023-12-18 DIAGNOSIS — I10 ESSENTIAL HYPERTENSION: ICD-10-CM

## 2023-12-18 DIAGNOSIS — Z79.4 TYPE 2 DIABETES MELLITUS WITH STAGE 4 CHRONIC KIDNEY DISEASE, WITH LONG-TERM CURRENT USE OF INSULIN (HCC): ICD-10-CM

## 2023-12-18 PROCEDURE — 96372 THER/PROPH/DIAG INJ SC/IM: CPT | Performed by: STUDENT IN AN ORGANIZED HEALTH CARE EDUCATION/TRAINING PROGRAM

## 2023-12-18 PROCEDURE — 99214 OFFICE O/P EST MOD 30 MIN: CPT | Performed by: STUDENT IN AN ORGANIZED HEALTH CARE EDUCATION/TRAINING PROGRAM

## 2023-12-18 RX ORDER — FLUTICASONE PROPIONATE 50 MCG
1 SPRAY, SUSPENSION (ML) NASAL DAILY
Qty: 11.1 ML | Refills: 1 | Status: SHIPPED | OUTPATIENT
Start: 2023-12-18 | End: 2024-01-01

## 2023-12-18 RX ADMIN — CYANOCOBALAMIN 1000 MCG: 1000 INJECTION, SOLUTION INTRAMUSCULAR; SUBCUTANEOUS at 10:58

## 2023-12-18 NOTE — PROGRESS NOTES
Name: Trina Davis      : 1950      MRN: [id number]  Encounter Provider: Norma Villa MD  Encounter Date: 2023   Encounter department: [facility] INTERNAL MEDICINE [place]    Assessment & Plan     1. B12 deficiency  Assessment & Plan:  C/w B12 injections weekly for additional month  Switch to monthly injection after that      2. Type 2 diabetes mellitus with stage 4 chronic kidney disease, with long-term current use of insulin ([facility])  Assessment & Plan:    Lab Results   Component Value Date    HGBA1C 7.1 (H) 10/28/2023   Pt claim compliance with insulin  Current regiment 18 U Novolog TID with meals, 50 U Levemir HS  Sensor is in place, pt reports BS averaging 150-170  Pt is not compliant with diet   Pt was encouraged to follow diabetic diet  Diabetic foot exam showed slight decrease in sensation, no ulcer noticed  Proceed with endocrinology appt tomorrow      3. Hypothyroidism, unspecified type  Assessment & Plan:  Pt complains of low energy, weight gain, constipation  Most recent TSH 15.1, T4 0.57  Recently Levothyroxine dose was increased to 125 mcg daily  Pt states that she is compliant and taking medication 1 h before breakfast and other medications  Pt has appt with endocrinology tomorrow, pt encouraged to comply      4. Vitamin D deficiency  Assessment & Plan:  C/w Vit D3 1000 U daily  C/w Vit D2 50 000 U weekly  Pt claims compliance      5. Chronic constipation  Assessment & Plan:  Pt mentioned that she is having constipation for the past several days  Last BM reported this am  Pt states that she is taking over the counter medications and blame it on diet  Pt was informed that her constipation could be due to thyroid dysfunction. Pt will follow with endocrinology tomorrow  Pt was offered medications to help with constipation as needed however she refused  Increase fiber intake, increase water intake      6. Essential hypertension  Assessment & Plan:  C/w amlodipine 5 mg daily  C/w  Hydralazine 25 mg TID  C/w Losartan 50 mg BID  Monitor BP      7. Seasonal allergies  Assessment & Plan:  Pt complains of congestion which is more prominent than usually  Pt denies fever, purulent discharge or sinus pain/pressure  Use Flonase nasal spray as needed                Subjective      A 72 yo f with past medical history of HTN, HLD, s/p CVA, varicose, SHAYAN, COPD, CKD4, T2DM, hypothyroidism, dementia, urinary/bowel incontinency who presents for 1 month follow up.  Last clinic visit on 11/20/23.  Pt was seen by neurology on 12/11, MOCA 12/30.  Today pt states that she is feeling well and express no major complains. She states that her energy level is low, states that she is having constipation. Pt states that she gain about 5 lbs recently and blaming it on a diet. Also, pt admits balance problem.      Review of Systems   Constitutional: Negative.    HENT:  Positive for congestion.    Respiratory: Negative.     Cardiovascular: Negative.    Gastrointestinal:  Positive for constipation.   Genitourinary:         Urinary incontinence   Skin:         Dry skin on the scalp   Psychiatric/Behavioral:          Mild confusion as per , improving       Current Outpatient Medications on File Prior to Visit   Medication Sig    acetaminophen (TYLENOL) 325 mg tablet Take 3 tablets (975 mg total) by mouth every 8 (eight) hours    albuterol (Ventolin HFA) 90 mcg/act inhaler Inhale 2 puffs every 6 (six) hours as needed for wheezing    amLODIPine (NORVASC) 5 mg tablet Take 1 tablet (5 mg total) by mouth daily Do not start before October 31, 2023.    aspirin (ECOTRIN LOW STRENGTH) 81 mg EC tablet Take 1 tablet (81 mg total) by mouth daily Do not start before October 31, 2023.    B-D ULTRAFINE III SHORT PEN 31G X 8 MM MISC     Calcium Citrate 1040 MG TABS Take 500 mg by mouth Three times a day    cholecalciferol (VITAMIN D3) 1,000 units tablet Take 2 tablets (2,000 Units total) by mouth daily    Coenzyme Q10 (CoQ10) 100 MG  CAPS Take 100 mg by mouth in the morning    DULoxetine (CYMBALTA) 30 mg delayed release capsule Take 1 capsule (30 mg total) by mouth daily    ergocalciferol (VITAMIN D2) 50,000 units Take 1 capsule (50,000 Units total) by mouth once a week for 8 doses    famotidine (PEPCID) 10 mg tablet Take 1 tablet (10 mg total) by mouth daily Do not start before 2023.    ferrous sulfate 324 MG TBEC TAKE 1 TABLET (324 MG TOTAL) BY MOUTH EVERY OTHER DAY    hydrALAZINE (APRESOLINE) 25 mg tablet Take 1 tablet (25 mg total) by mouth 2 (two) times a day    Icosapent Ethyl 1 g CAPS Take 2 capsules (2 g total) by mouth 2 (two) times a day    insulin aspart (NovoLOG) 100 units/mL injection Inject under the skin 3 (three) times a day before meals 18 units, 18 units, 18 units.    insulin detemir (Levemir FlexTouch) 100 Units/mL injection pen Inject 50 Units under the skin every evening     Lactobacillus (PROBIOTIC ACIDOPHILUS PO) Take 1 capsule by mouth 2 (two) times a day    levothyroxine 125 mcg tablet Take 1 tablet (125 mcg total) by mouth daily in the early morning Do not start before 2023.    losartan (COZAAR) 50 mg tablet Take 1 tablet (50 mg total) by mouth 2 (two) times a day    methenamine hippurate (HIPREX) 1 g tablet Take 1 tablet (1 g total) by mouth 2 (two) times a day with meals    metoprolol tartrate (LOPRESSOR) 50 mg tablet Take 1 tablet (50 mg total) by mouth every 12 (twelve) hours    pantoprazole (PROTONIX) 40 mg tablet Take 1 tablet (40 mg total) by mouth 2 (two) times a day    pramipexole (MIRAPEX) 0.25 mg tablet Take 1 tablet (0.25 mg total) by mouth daily at bedtime    rosuvastatin (CRESTOR) 5 mg tablet Take 1 tablet (5 mg total) by mouth daily    torsemide (DEMADEX) 10 mg tablet Take 0.5 tablets (5 mg total) by mouth every other day Take this medication only if your weight exceeds 172 pounds    [] cephalexin (KEFLEX) 500 mg capsule Take 1 capsule (500 mg total) by mouth every 12  (twelve) hours (Patient not taking: Reported on 12/11/2023)       Objective     /80 (BP Location: Right arm, Patient Position: Sitting, Cuff Size: Adult)   Pulse 60   Temp 98.7 °F (37.1 °C) (Tympanic)   Resp 14   Wt 77.1 kg (170 lb)   SpO2 97%   BMI 31.09 kg/m²     Physical Exam  Constitutional:       General: She is not in acute distress.     Appearance: She is obese. She is not ill-appearing, toxic-appearing or diaphoretic.   HENT:      Head: Normocephalic and atraumatic.      Nose: Nose normal.      Mouth/Throat:      Mouth: Mucous membranes are moist.   Eyes:      Conjunctiva/sclera: Conjunctivae normal.   Cardiovascular:      Rate and Rhythm: Normal rate and regular rhythm.      Pulses: Normal pulses. no weak pulses          Dorsalis pedis pulses are 2+ on the right side and 2+ on the left side.        Posterior tibial pulses are 2+ on the right side and 2+ on the left side.      Heart sounds: Normal heart sounds.   Abdominal:      General: There is distension.      Tenderness: There is abdominal tenderness.   Musculoskeletal:      Right lower leg: No edema.      Left lower leg: No edema.        Feet:    Feet:      Right foot:      Skin integrity: Dry skin present. No ulcer, skin breakdown or erythema.      Left foot:      Skin integrity: Dry skin present. No ulcer, skin breakdown or erythema.   Skin:     General: Skin is warm and dry.      Comments: Dry skin on the scalp, face consistent with actinic keratosis  Dry skin on the feet     Neurological:      General: No focal deficit present.      Mental Status: She is alert. Mental status is at baseline.      Cranial Nerves: No cranial nerve deficit.      Motor: No weakness.   Psychiatric:      Comments: Pleasant lady with slow response     Patient's shoes and socks removed.    Right Foot/Ankle   Right Foot Inspection  Skin Exam: dry skin. No erythema, no maceration, no pre-ulcer and no ulcer.     Toe Exam: No swelling, no tenderness and  erythema    Sensory   Monofilament testing: diminished    Vascular  The right DP pulse is 2+. The right PT pulse is 2+.     Left Foot/Ankle  Left Foot Inspection  Skin Exam: dry skin. No erythema, no maceration, no pre-ulcer and no ulcer.     Toe Exam: No swelling, no tenderness and no erythema.     Sensory   Monofilament testing: diminished    Vascular  The left DP pulse is 2+. The left PT pulse is 2+.     Assign Risk Category  No deformity present  Loss of protective sensation  No weak pulses  Risk: 1         Norma Villa MD

## 2023-12-18 NOTE — ASSESSMENT & PLAN NOTE
Lab Results   Component Value Date    HGBA1C 7.1 (H) 10/28/2023   Pt claim compliance with insulin  Current regiment 18 U Novolog TID with meals, 50 U Levemir HS  Sensor is in place, pt reports BS averaging 150-170  Pt is not compliant with diet   Pt was encouraged to follow diabetic diet  Diabetic foot exam showed slight decrease in sensation, no ulcer noticed  Proceed with endocrinology appt tomorrow

## 2023-12-18 NOTE — ASSESSMENT & PLAN NOTE
Pt mentioned that she is having constipation for the past several days  Last BM reported this am  Pt states that she is taking over the counter medications and blame it on diet  Pt was informed that her constipation could be due to thyroid dysfunction. Pt will follow with endocrinology tomorrow  Pt was offered medications to help with constipation as needed however she refused  Increase fiber intake, increase water intake

## 2023-12-18 NOTE — ASSESSMENT & PLAN NOTE
Pt complains of low energy, weight gain, constipation  Most recent TSH 15.1, T4 0.57  Recently Levothyroxine dose was increased to 125 mcg daily  Pt states that she is compliant and taking medication 1 h before breakfast and other medications  Pt has appt with endocrinology tomorrow, pt encouraged to comply

## 2023-12-18 NOTE — ASSESSMENT & PLAN NOTE
Pt complains of congestion which is more prominent than usually  Pt denies fever, purulent discharge or sinus pain/pressure  Use Flonase nasal spray as needed

## 2023-12-20 DIAGNOSIS — I10 PRIMARY HYPERTENSION: ICD-10-CM

## 2023-12-20 RX ORDER — AMLODIPINE BESYLATE 5 MG/1
5 TABLET ORAL DAILY
Qty: 90 TABLET | Refills: 1 | Status: SHIPPED | OUTPATIENT
Start: 2023-12-20

## 2023-12-22 ENCOUNTER — TELEPHONE (OUTPATIENT)
Dept: NEPHROLOGY | Facility: CLINIC | Age: 73
End: 2023-12-22

## 2023-12-22 ENCOUNTER — APPOINTMENT (OUTPATIENT)
Dept: LAB | Facility: AMBULARY SURGERY CENTER | Age: 73
End: 2023-12-22
Payer: MEDICARE

## 2023-12-22 DIAGNOSIS — R80.1 PERSISTENT PROTEINURIA: ICD-10-CM

## 2023-12-22 DIAGNOSIS — I12.9 HYPERTENSIVE CHRONIC KIDNEY DISEASE WITH STAGE 1 THROUGH STAGE 4 CHRONIC KIDNEY DISEASE, OR UNSPECIFIED CHRONIC KIDNEY DISEASE: ICD-10-CM

## 2023-12-22 DIAGNOSIS — I12.9 PARENCHYMAL RENAL HYPERTENSION, STAGE 1 THROUGH STAGE 4 OR UNSPECIFIED CHRONIC KIDNEY DISEASE: ICD-10-CM

## 2023-12-22 DIAGNOSIS — N18.4 CKD (CHRONIC KIDNEY DISEASE) STAGE 4, GFR 15-29 ML/MIN (HCC): Primary | ICD-10-CM

## 2023-12-22 DIAGNOSIS — E11.21 DIABETIC NEPHROPATHY ASSOCIATED WITH TYPE 2 DIABETES MELLITUS (HCC): ICD-10-CM

## 2023-12-22 DIAGNOSIS — N18.4 ANEMIA IN STAGE 4 CHRONIC KIDNEY DISEASE: ICD-10-CM

## 2023-12-22 DIAGNOSIS — E78.2 MIXED HYPERLIPIDEMIA: ICD-10-CM

## 2023-12-22 DIAGNOSIS — N18.4 STAGE 4 CHRONIC KIDNEY DISEASE (HCC): ICD-10-CM

## 2023-12-22 DIAGNOSIS — E55.9 VITAMIN D DEFICIENCY: ICD-10-CM

## 2023-12-22 DIAGNOSIS — D63.1 ANEMIA IN STAGE 4 CHRONIC KIDNEY DISEASE: ICD-10-CM

## 2023-12-22 LAB
ALBUMIN SERPL BCP-MCNC: 3.5 G/DL (ref 3.5–5)
ALP SERPL-CCNC: 60 U/L (ref 34–104)
ALT SERPL W P-5'-P-CCNC: 30 U/L (ref 7–52)
ANION GAP SERPL CALCULATED.3IONS-SCNC: 7 MMOL/L
AST SERPL W P-5'-P-CCNC: 27 U/L (ref 13–39)
BILIRUB SERPL-MCNC: 0.32 MG/DL (ref 0.2–1)
BUN SERPL-MCNC: 32 MG/DL (ref 5–25)
CALCIUM SERPL-MCNC: 9 MG/DL (ref 8.4–10.2)
CHLORIDE SERPL-SCNC: 105 MMOL/L (ref 96–108)
CO2 SERPL-SCNC: 28 MMOL/L (ref 21–32)
CREAT SERPL-MCNC: 2.67 MG/DL (ref 0.6–1.3)
CREAT UR-MCNC: 50.2 MG/DL
ERYTHROCYTE [DISTWIDTH] IN BLOOD BY AUTOMATED COUNT: 15 % (ref 11.6–15.1)
FERRITIN SERPL-MCNC: 13 NG/ML (ref 11–307)
GFR SERPL CREATININE-BSD FRML MDRD: 16 ML/MIN/1.73SQ M
GLUCOSE P FAST SERPL-MCNC: 114 MG/DL (ref 65–99)
HCT VFR BLD AUTO: 40.3 % (ref 34.8–46.1)
HGB BLD-MCNC: 13.2 G/DL (ref 11.5–15.4)
IRON SATN MFR SERPL: 21 % (ref 15–50)
IRON SERPL-MCNC: 78 UG/DL (ref 50–212)
MAGNESIUM SERPL-MCNC: 1.9 MG/DL (ref 1.9–2.7)
MCH RBC QN AUTO: 30.1 PG (ref 26.8–34.3)
MCHC RBC AUTO-ENTMCNC: 32.8 G/DL (ref 31.4–37.4)
MCV RBC AUTO: 92 FL (ref 82–98)
PHOSPHATE SERPL-MCNC: 4.6 MG/DL (ref 2.3–4.1)
PLATELET # BLD AUTO: 343 THOUSANDS/UL (ref 149–390)
PMV BLD AUTO: 11.8 FL (ref 8.9–12.7)
POTASSIUM SERPL-SCNC: 3.7 MMOL/L (ref 3.5–5.3)
PROT SERPL-MCNC: 6.3 G/DL (ref 6.4–8.4)
PROT UR-MCNC: 402 MG/DL
PROT/CREAT UR: 8.01 MG/G{CREAT} (ref 0–0.1)
PTH-INTACT SERPL-MCNC: 88.5 PG/ML (ref 12–88)
RBC # BLD AUTO: 4.38 MILLION/UL (ref 3.81–5.12)
SODIUM SERPL-SCNC: 140 MMOL/L (ref 135–147)
TIBC SERPL-MCNC: 366 UG/DL (ref 250–450)
UIBC SERPL-MCNC: 288 UG/DL (ref 155–355)
WBC # BLD AUTO: 10.51 THOUSAND/UL (ref 4.31–10.16)

## 2023-12-22 PROCEDURE — 84100 ASSAY OF PHOSPHORUS: CPT

## 2023-12-22 PROCEDURE — 83970 ASSAY OF PARATHORMONE: CPT

## 2023-12-22 PROCEDURE — 36415 COLL VENOUS BLD VENIPUNCTURE: CPT

## 2023-12-22 PROCEDURE — 84156 ASSAY OF PROTEIN URINE: CPT

## 2023-12-22 PROCEDURE — 83550 IRON BINDING TEST: CPT

## 2023-12-22 PROCEDURE — 82306 VITAMIN D 25 HYDROXY: CPT

## 2023-12-22 PROCEDURE — 83540 ASSAY OF IRON: CPT

## 2023-12-22 PROCEDURE — 82570 ASSAY OF URINE CREATININE: CPT

## 2023-12-22 PROCEDURE — 83735 ASSAY OF MAGNESIUM: CPT

## 2023-12-22 PROCEDURE — 82728 ASSAY OF FERRITIN: CPT

## 2023-12-22 PROCEDURE — 85027 COMPLETE CBC AUTOMATED: CPT

## 2023-12-22 NOTE — TELEPHONE ENCOUNTER
----- Message from Donaldo Baires MD sent at 12/22/2023  2:12 PM EST -----  I would add spironolactone 25 mg daily in the morning both for blood pressure and protein in the urine  Repeat a basic metabolic profile 1 to 2 weeks nonfasting  Repeat blood pressures in 2 to 3 weeks for 1 week  ----- Message -----  From: Lab, Background User  Sent: 12/22/2023   1:57 PM EST  To: Donaldo Baires MD

## 2023-12-26 ENCOUNTER — OFFICE VISIT (OUTPATIENT)
Dept: INTERNAL MEDICINE CLINIC | Facility: CLINIC | Age: 73
End: 2023-12-26
Payer: MEDICARE

## 2023-12-26 DIAGNOSIS — E78.2 MIXED HYPERLIPIDEMIA: ICD-10-CM

## 2023-12-26 DIAGNOSIS — E53.8 B12 DEFICIENCY: ICD-10-CM

## 2023-12-26 DIAGNOSIS — E78.1 HYPERTRIGLYCERIDEMIA: Primary | ICD-10-CM

## 2023-12-26 DIAGNOSIS — G25.81 RLS (RESTLESS LEGS SYNDROME): ICD-10-CM

## 2023-12-26 DIAGNOSIS — I10 PRIMARY HYPERTENSION: Primary | ICD-10-CM

## 2023-12-26 PROCEDURE — 96372 THER/PROPH/DIAG INJ SC/IM: CPT | Performed by: INTERNAL MEDICINE

## 2023-12-26 RX ORDER — SPIRONOLACTONE 25 MG/1
25 TABLET ORAL DAILY
Qty: 30 TABLET | Refills: 5 | Status: SHIPPED | OUTPATIENT
Start: 2023-12-26

## 2023-12-26 RX ORDER — PRAMIPEXOLE DIHYDROCHLORIDE 0.25 MG/1
0.25 TABLET ORAL
Qty: 90 TABLET | Refills: 0 | Status: SHIPPED | OUTPATIENT
Start: 2023-12-26

## 2023-12-26 RX ADMIN — CYANOCOBALAMIN 1000 MCG: 1000 INJECTION, SOLUTION INTRAMUSCULAR; SUBCUTANEOUS at 10:23

## 2023-12-29 DIAGNOSIS — I10 ESSENTIAL HYPERTENSION: ICD-10-CM

## 2023-12-29 DIAGNOSIS — I12.9 PARENCHYMAL RENAL HYPERTENSION, STAGE 1 THROUGH STAGE 4 OR UNSPECIFIED CHRONIC KIDNEY DISEASE: ICD-10-CM

## 2023-12-29 DIAGNOSIS — N25.81 SECONDARY HYPERPARATHYROIDISM OF RENAL ORIGIN (HCC): ICD-10-CM

## 2023-12-29 LAB
25(OH)D2 SERPL-MCNC: 22 NG/ML
25(OH)D3 SERPL-MCNC: 10 NG/ML
25(OH)D3+25(OH)D2 SERPL-MCNC: 32 NG/ML

## 2023-12-29 RX ORDER — HYDRALAZINE HYDROCHLORIDE 25 MG/1
25 TABLET, FILM COATED ORAL 2 TIMES DAILY
Qty: 180 TABLET | Refills: 2 | Status: SHIPPED | OUTPATIENT
Start: 2023-12-29

## 2024-01-02 ENCOUNTER — OFFICE VISIT (OUTPATIENT)
Dept: INTERNAL MEDICINE CLINIC | Facility: CLINIC | Age: 74
End: 2024-01-02
Payer: MEDICARE

## 2024-01-02 DIAGNOSIS — E53.8 B12 DEFICIENCY: Primary | ICD-10-CM

## 2024-01-02 PROCEDURE — 96372 THER/PROPH/DIAG INJ SC/IM: CPT | Performed by: INTERNAL MEDICINE

## 2024-01-02 RX ADMIN — CYANOCOBALAMIN 1000 MCG: 1000 INJECTION, SOLUTION INTRAMUSCULAR; SUBCUTANEOUS at 10:01

## 2024-01-03 ENCOUNTER — PATIENT MESSAGE (OUTPATIENT)
Dept: UROLOGY | Facility: CLINIC | Age: 74
End: 2024-01-03

## 2024-01-03 DIAGNOSIS — N39.0 RECURRENT UTI: ICD-10-CM

## 2024-01-03 RX ORDER — METHENAMINE HIPPURATE 1000 MG/1
1 TABLET ORAL 2 TIMES DAILY WITH MEALS
Qty: 180 TABLET | Refills: 3 | Status: SHIPPED | OUTPATIENT
Start: 2024-01-03

## 2024-01-09 ENCOUNTER — CLINICAL SUPPORT (OUTPATIENT)
Dept: INTERNAL MEDICINE CLINIC | Facility: CLINIC | Age: 74
End: 2024-01-09
Payer: MEDICARE

## 2024-01-09 DIAGNOSIS — E53.8 B12 DEFICIENCY: Primary | ICD-10-CM

## 2024-01-09 PROCEDURE — 96372 THER/PROPH/DIAG INJ SC/IM: CPT | Performed by: INTERNAL MEDICINE

## 2024-01-09 RX ADMIN — CYANOCOBALAMIN 1000 MCG: 1000 INJECTION, SOLUTION INTRAMUSCULAR; SUBCUTANEOUS at 09:54

## 2024-01-10 ENCOUNTER — OFFICE VISIT (OUTPATIENT)
Age: 74
End: 2024-01-10
Payer: MEDICARE

## 2024-01-10 VITALS
SYSTOLIC BLOOD PRESSURE: 116 MMHG | DIASTOLIC BLOOD PRESSURE: 68 MMHG | WEIGHT: 164.4 LBS | OXYGEN SATURATION: 98 % | TEMPERATURE: 96.8 F | HEIGHT: 62 IN | HEART RATE: 62 BPM | BODY MASS INDEX: 30.25 KG/M2

## 2024-01-10 DIAGNOSIS — F03.A0 MILD DEMENTIA WITHOUT BEHAVIORAL DISTURBANCE, PSYCHOTIC DISTURBANCE, MOOD DISTURBANCE, OR ANXIETY, UNSPECIFIED DEMENTIA TYPE (HCC): Primary | Chronic | ICD-10-CM

## 2024-01-10 DIAGNOSIS — E03.9 ACQUIRED HYPOTHYROIDISM: Chronic | ICD-10-CM

## 2024-01-10 DIAGNOSIS — K59.01 SLOW TRANSIT CONSTIPATION: ICD-10-CM

## 2024-01-10 DIAGNOSIS — G25.81 RLS (RESTLESS LEGS SYNDROME): Chronic | ICD-10-CM

## 2024-01-10 DIAGNOSIS — I10 ESSENTIAL HYPERTENSION: ICD-10-CM

## 2024-01-10 DIAGNOSIS — I63.9 CEREBROVASCULAR ACCIDENT (CVA), UNSPECIFIED MECHANISM (HCC): ICD-10-CM

## 2024-01-10 DIAGNOSIS — K21.9 GASTROESOPHAGEAL REFLUX DISEASE WITHOUT ESOPHAGITIS: Chronic | ICD-10-CM

## 2024-01-10 DIAGNOSIS — E11.22 TYPE 2 DIABETES MELLITUS WITH STAGE 4 CHRONIC KIDNEY DISEASE, WITH LONG-TERM CURRENT USE OF INSULIN (HCC): ICD-10-CM

## 2024-01-10 DIAGNOSIS — G47.33 OSA (OBSTRUCTIVE SLEEP APNEA): Chronic | ICD-10-CM

## 2024-01-10 DIAGNOSIS — E53.8 B12 DEFICIENCY: Chronic | ICD-10-CM

## 2024-01-10 DIAGNOSIS — Z79.4 TYPE 2 DIABETES MELLITUS WITH STAGE 4 CHRONIC KIDNEY DISEASE, WITH LONG-TERM CURRENT USE OF INSULIN (HCC): ICD-10-CM

## 2024-01-10 DIAGNOSIS — Z86.73 HISTORY OF CEREBROVASCULAR ACCIDENT (CVA) DUE TO ISCHEMIA: Chronic | ICD-10-CM

## 2024-01-10 DIAGNOSIS — E78.2 MIXED HYPERLIPIDEMIA: ICD-10-CM

## 2024-01-10 DIAGNOSIS — N18.4 TYPE 2 DIABETES MELLITUS WITH STAGE 4 CHRONIC KIDNEY DISEASE, WITH LONG-TERM CURRENT USE OF INSULIN (HCC): ICD-10-CM

## 2024-01-10 PROBLEM — R33.9 URINARY RETENTION: Status: RESOLVED | Noted: 2022-04-13 | Resolved: 2024-01-10

## 2024-01-10 PROBLEM — G63 B12 NEUROPATHY (HCC): Chronic | Status: RESOLVED | Noted: 2021-03-02 | Resolved: 2024-01-10

## 2024-01-10 PROBLEM — R51.9 HEADACHE: Status: RESOLVED | Noted: 2023-01-03 | Resolved: 2024-01-10

## 2024-01-10 PROBLEM — E78.1 HYPERTRIGLYCERIDEMIA: Status: RESOLVED | Noted: 2020-05-08 | Resolved: 2024-01-10

## 2024-01-10 PROBLEM — R80.1 PERSISTENT PROTEINURIA: Status: RESOLVED | Noted: 2019-06-25 | Resolved: 2024-01-10

## 2024-01-10 PROBLEM — E55.9 VITAMIN D DEFICIENCY: Chronic | Status: ACTIVE | Noted: 2020-05-08

## 2024-01-10 PROBLEM — R41.3 MEMORY CHANGES: Status: RESOLVED | Noted: 2020-10-22 | Resolved: 2024-01-10

## 2024-01-10 PROBLEM — R32 URINARY INCONTINENCE: Status: RESOLVED | Noted: 2022-08-11 | Resolved: 2024-01-10

## 2024-01-10 PROBLEM — R41.0 CONFUSION: Status: RESOLVED | Noted: 2023-10-05 | Resolved: 2024-01-10

## 2024-01-10 PROBLEM — R41.89 COGNITIVE IMPAIRMENT: Status: RESOLVED | Noted: 2023-11-08 | Resolved: 2024-01-10

## 2024-01-10 PROBLEM — F33.9 DEPRESSION, RECURRENT (HCC): Status: RESOLVED | Noted: 2021-04-01 | Resolved: 2024-01-10

## 2024-01-10 PROBLEM — N25.81 SECONDARY HYPERPARATHYROIDISM OF RENAL ORIGIN (HCC): Chronic | Status: ACTIVE | Noted: 2023-09-19

## 2024-01-10 PROBLEM — F03.918 DEMENTIA WITH BEHAVIORAL PROBLEM (HCC): Chronic | Status: ACTIVE | Noted: 2023-08-21

## 2024-01-10 PROBLEM — G63 B12 NEUROPATHY (HCC): Chronic | Status: ACTIVE | Noted: 2021-03-02

## 2024-01-10 PROBLEM — Z87.440 HISTORY OF RECURRENT UTIS: Chronic | Status: ACTIVE | Noted: 2023-10-27

## 2024-01-10 PROBLEM — F33.9 DEPRESSION, RECURRENT (HCC): Chronic | Status: ACTIVE | Noted: 2021-04-01

## 2024-01-10 PROCEDURE — G2212 PROLONG OUTPT/OFFICE VIS: HCPCS | Performed by: INTERNAL MEDICINE

## 2024-01-10 PROCEDURE — 99205 OFFICE O/P NEW HI 60 MIN: CPT | Performed by: INTERNAL MEDICINE

## 2024-01-10 RX ORDER — ROSUVASTATIN CALCIUM 10 MG/1
10 TABLET, COATED ORAL DAILY
COMMUNITY
Start: 2023-12-19

## 2024-01-10 RX ORDER — LEVOTHYROXINE SODIUM 137 UG/1
137 TABLET ORAL DAILY
COMMUNITY
Start: 2023-12-19

## 2024-01-10 RX ORDER — SENNOSIDES 8.6 MG
8.6 TABLET ORAL DAILY
Qty: 90 TABLET | Refills: 3 | Status: SHIPPED | OUTPATIENT
Start: 2024-01-10

## 2024-01-10 RX ORDER — CYCLOSPORINE 0.5 MG/ML
1 EMULSION OPHTHALMIC EVERY 12 HOURS
COMMUNITY
Start: 2023-12-24

## 2024-01-10 RX ORDER — TORSEMIDE 10 MG/1
5 TABLET ORAL DAILY PRN
Start: 2024-01-10 | End: 2024-05-09

## 2024-01-10 RX ORDER — LANOLIN ALCOHOL/MO/W.PET/CERES
1000 CREAM (GRAM) TOPICAL DAILY
Qty: 90 TABLET | Refills: 3 | Status: SHIPPED | OUTPATIENT
Start: 2024-01-10

## 2024-01-10 RX ORDER — GABAPENTIN 100 MG/1
200 CAPSULE ORAL
COMMUNITY
Start: 2023-12-20

## 2024-01-10 NOTE — PROGRESS NOTES
ASSESSMENT AND PLAN:  1. Mild dementia without behavioral disturbance, psychotic disturbance, mood disturbance, or anxiety, unspecified dementia type (HCC)  Assessment & Plan:  Exact cause of dementia is unclear, but luckily recent MoCA testing has improved, now 15/30 from previous 12/30 in December and 7/30 in October.  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  She does not seem like a typical Alzheimer's patient. Considering significant metabolic medical history with CKD IV and IDDM, these may be playing a significant role in her cognitive health.  I would not recommend cholinergic therapy considering polypharmacy and CKD IV.    Decision-making capacity: Does not have capacity for complex medical or financial decisions    Staging: Mild Stage Dementia (unspecified type)    Medications Review: Reviewed current polypharmacy.  Will stop duloxetine considering renal function.  As well would recommend reducing statin therapy as I do not detect significant atherosclerotic disease.    Caregiver Review: Low caregiver strain      2. Cerebrovascular accident (CVA), unspecified mechanism (HCC)  -     Ambulatory Referral to [facility]    3. Essential hypertension  -     torsemide (DEMADEX) 10 mg tablet; Take 0.5 tablets (5 mg total) by mouth daily as needed (weight gain)    4. Type 2 diabetes mellitus with stage 4 chronic kidney disease, with long-term current use of insulin (HCC)  Assessment & Plan:  Would recommend decreasing losartan therapy considering CKD IV verging on CKD V.  As well, considering blood pressure issues, would recommend moving away from hydralazine and starting daily torsemide daily.  As well recommend tapering PPI dosing considering CKD IV.  Continue current insulin, spironolactone.      5. Mixed hyperlipidemia  Assessment & Plan:  LDL is exceedingly low and CTA does not show significant atherosclerotic disease.  Recommend decreasing statin therapy to atorvastatin 10 mg  daily.      6. History of cerebrovascular accident (CVA) due to ischemia  Assessment & Plan:  Related to blood pressure issues, not atherosclerosis.  Continue to titrate BP medications.      7. Slow transit constipation  -     senna (SENOKOT) 8.6 mg; Take 1 tablet (8.6 mg total) by mouth in the morning    8. Gastroesophageal reflux disease without esophagitis  Assessment & Plan:  Likely complicated by constipation.  Start senna.  Would recommend decreasing PPI dose as this may complicated CKD IV as well.      9. B12 deficiency  Assessment & Plan:  Likely can simply take oral vitamin B12 supplements.  Should not need B12 injections as she was able to do very well in her levels previously with oral pills.  As well methylmalonic acid will not be a useful marker considering CKD 4.    Orders:  -     vitamin B-12 (VITAMIN B-12) 1,000 mcg tablet; Take 1 tablet (1,000 mcg total) by mouth daily    10. RLS (restless legs syndrome)  Assessment & Plan:  Stable.  Continue pramipexole      11. Acquired hypothyroidism  Assessment & Plan:  Would recommend TSH goal 3-8.  Continue with levothyroxine      12. SHAYAN (obstructive sleep apnea)  Assessment & Plan:  Does not like the Inspire therapy as it wakes her up.            HPI:    We had the pleasure of evaluating Trina Davis who is a 73 y.o. female in Geriatric consultation today.  Ms. Davis is in the office with her  Bruce.  She lives with her  and their son.    HISTORY AS PER  ANTONELLA:  Reports decline in cognition in the last few years with many episodes of acute confusion with repeated antibiotics for possible UTIs and then increasing oral fluid intake which seemed to help some.  As well, had a small CVA October.  He     COGNITION:  Memory Issues noticed since 3 year(s)    Memory affected: short term memory loss    Symptoms started: gradual  Over time the memory has:  worsened  Memory issue(s) were noted by: family   Difficulty finding the right word while  speaking: No  Requires repeat information or asking the same question repeatedly: Yes  Fluctuation in alertness: No  Changes in mood or personality:No  Current or previous treatment for depression or anxiety: Yes    Family member with dementia and what type? no  History of head trauma: No  History of stroke: Yes  History of alcohol or substance abuse: No    FUNCTIONAL STATUS:  BADLs  Does patient require assistance with:  Bathing: No  Dressing: No  Transferring: No  Continence: No  Toileting: No  Feeding: No    IADLs  Dose patient require assistance with:  Telephone: No  Shopping: Yes  Food Preparation: No  Housekeeping: No  Laundry: No  Transportation: Yes, stopped driving a while ago  Medications: Yes,  fills pill boxes  Finances: Yes    NEUROPSYCH SYMPTOMS:  Does patient get angry or hostile?  Resist care from others? No  Does patient see or hear things that no one else can see or hear? No  Does patient act impatient and cranky? Does mood frequently change for no apparent reason? No  Does patient act suspicious without good reason (example: believes that others are stealing from him or her, or planning to harm him or her in some way)? No  Does patient less interested in his or her usual activities or in the activities and plans of others? No  Does patient have trouble sleeping at night? No  Dose patient have abnormal movements while asleep? Yes    SAFETY:  Hearing and vision issue: Yes, with significant cataracts, planning on surgery  Any gait or balance disorder: Yes, only  a little shakiness when getting up  Uses: walker for longer distances outside the house  Any falls in the last year: Yes  Any history of wandering: No  Are there firearms or guns in the home: No  Does patient drive: No  Any driving accidents or citations in the last year: No  Any concerns about patient's ability to drive: Yes    ACP REVIEW:  Does patient have POA: Yes  Does patient have a Living will: Yes  Any legal assistance needed  for healthcare planning?: No    Allergies   Allergen Reactions    Ciprofloxacin Hives    Sulfamethoxazole-Trimethoprim Tongue Swelling       Medications:    Current Outpatient Medications on File Prior to Visit   Medication Sig Dispense Refill    albuterol (Ventolin HFA) 90 mcg/act inhaler Inhale 2 puffs every 6 (six) hours as needed for wheezing 54 g 0    amLODIPine (NORVASC) 5 mg tablet Take 1 tablet (5 mg total) by mouth daily 90 tablet 1    aspirin (ECOTRIN LOW STRENGTH) 81 mg EC tablet Take 1 tablet (81 mg total) by mouth daily Do not start before October 31, 2023.  0    B-D ULTRAFINE III SHORT PEN 31G X 8 MM MISC   3    Calcium Citrate 1040 MG TABS Take 500 mg by mouth Three times a day      cholecalciferol (VITAMIN D3) 1,000 units tablet Take 2 tablets (2,000 Units total) by mouth daily (Patient taking differently: Take 2,000 Units by mouth daily On hold for eight weeks then start again) 180 tablet 1    Coenzyme Q10 (CoQ10) 100 MG CAPS Take 100 mg by mouth in the morning Bed time      ergocalciferol (VITAMIN D2) 50,000 units Take 1 capsule (50,000 Units total) by mouth once a week for 8 doses 8 capsule 0    famotidine (PEPCID) 10 mg tablet Take 1 tablet (10 mg total) by mouth daily Do not start before September 13, 2023. 30 tablet 0    ferrous sulfate 324 MG TBEC TAKE 1 TABLET (324 MG TOTAL) BY MOUTH EVERY OTHER DAY 45 tablet 0    fluticasone (FLONASE) 50 mcg/act nasal spray 1 spray into each nostril daily for 14 days 11.1 mL 1    gabapentin (NEURONTIN) 100 mg capsule Take 200 mg by mouth daily at bedtime      hydrALAZINE (APRESOLINE) 25 mg tablet TAKE 1 TABLET BY MOUTH TWICE A  tablet 2    Icosapent Ethyl 1 g CAPS Take 2 capsules (2 g total) by mouth 2 (two) times a day 120 capsule 3    insulin aspart (NovoLOG) 100 units/mL injection Inject under the skin 3 (three) times a day before meals 18 units, 18 units, 22 units.      insulin detemir (Levemir FlexTouch) 100 Units/mL injection pen Inject 50 Units  under the skin every evening       Lactobacillus (PROBIOTIC ACIDOPHILUS PO) Take 1 capsule by mouth 2 (two) times a day      levothyroxine 137 mcg tablet Take 137 mcg by mouth daily      losartan (COZAAR) 50 mg tablet Take 1 tablet (50 mg total) by mouth 2 (two) times a day 180 tablet 0    methenamine hippurate (HIPREX) 1 g tablet Take 1 tablet (1 g total) by mouth 2 (two) times a day with meals 180 tablet 3    metoprolol tartrate (LOPRESSOR) 50 mg tablet Take 1 tablet (50 mg total) by mouth every 12 (twelve) hours 180 tablet 3    pantoprazole (PROTONIX) 40 mg tablet Take 1 tablet (40 mg total) by mouth 2 (two) times a day 180 tablet 1    pramipexole (MIRAPEX) 0.25 mg tablet Take 1 tablet (0.25 mg total) by mouth daily at bedtime 90 tablet 0    Restasis 0.05 % ophthalmic emulsion Administer 1 drop to both eyes every 12 (twelve) hours      rosuvastatin (CRESTOR) 10 MG tablet Take 10 mg by mouth daily      spironolactone (ALDACTONE) 25 mg tablet Take 1 tablet (25 mg total) by mouth daily 30 tablet 5    [DISCONTINUED] acetaminophen (TYLENOL) 325 mg tablet Take 3 tablets (975 mg total) by mouth every 8 (eight) hours  0    [DISCONTINUED] DULoxetine (CYMBALTA) 30 mg delayed release capsule Take 1 capsule (30 mg total) by mouth daily  0    [DISCONTINUED] torsemide (DEMADEX) 10 mg tablet Take 0.5 tablets (5 mg total) by mouth every other day Take this medication only if your weight exceeds 172 pounds 30 tablet 0    [DISCONTINUED] levothyroxine 125 mcg tablet Take 1 tablet (125 mcg total) by mouth daily in the early morning Do not start before October 31, 2023.  0    [DISCONTINUED] rosuvastatin (CRESTOR) 5 mg tablet Take 1 tablet (5 mg total) by mouth daily 90 tablet 3     Current Facility-Administered Medications on File Prior to Visit   Medication Dose Route Frequency Provider Last Rate Last Admin    [DISCONTINUED] cyanocobalamin injection 1,000 mcg  1,000 mcg Intramuscular Weekly Maryse Rae MD   1,000 mcg at  "01/09/24 0954       History:  Past Medical History:   Diagnosis Date    Actinic keratosis     Acute cystitis without hematuria 04/28/2023    Acute cystitis without hematuria 04/28/2023    Acute-on-chronic kidney injury      NIKOS (acute kidney injury) (HCC) 05/08/2020    Allergic     Allergic rhinitis     Ambulatory dysfunction 10/22/2020    AMS (altered mental status) 07/14/2020    Anesthesia     pt reports \"had to use double lumen endo tube for hiatal hernia repair/so surgeon could get to where he needed to work\"    Arthritis     Aspiration into airway     Michael esophagus 1993    Lot of stress with children    Basal cell carcinoma 2007    left cheek     BCC (basal cell carcinoma) 05/27/2021    Left Nasal tip    Breast discharge 06/19/2023    Breast pain 06/19/2023    Cancer (HCC)     squamous cell cancer on forhead    Cancer (HCC)     basal cell on nose     Cholelithiasis     Chronic kidney disease 2000, 2018    Stones, kidney disease stage 4    Chronic pain disorder     bilat feet and joint pain on occas    Colon polyp     Confusion 10/30/2023    Constipation     COPD (chronic obstructive pulmonary disease) (HCC)     COVID-19 08/2021    recovered at home/did receive monoclonal infusion    COVID-19 virus infection 08/16/2021    Dental bridge present     Depression     Diabetes mellitus (HCC)     Type 2    Diabetic neuropathy (HCC)     Difficulty walking 2017    Disease of thyroid gland     Dyspnea     Elevated liver enzymes 04/04/2023    Epigastric abdominal pain with severe diabetic gastroparesis, status post EGD/Botox injection, 5/14 05/17/2021    Facial abrasion, initial encounter 03/22/2023    Fall 03/22/2023    Family history of thyroid problem     Fatty liver     Fibromyalgia, primary     Fracture of orbital floor, blow-out, right, closed, with routine healing, subsequent encounter 03/22/2023    GERD (gastroesophageal reflux disease)     Heart burn     Hiatal hernia     History of cholecystectomy 10/27/2023 "    History of colon polyps 07/30/2021    History of kidney stones 09/29/2020    Hx of recurrent kidney stones    History of kidney stones 09/29/2020    Hx of recurrent kidney stones  Formatting of this note might be different from the original. Hx of recurrent kidney stones  Last Assessment & Plan:  Formatting of this note might be different from the original. We will set her up for follow-up in 3 months with a KUB prior.  She knows to call if she has any kidney stone type pain in the meantime.    History of Nissen fundoplication 10/27/2023    History of pneumonia     History of repair of hiatal hernia 05/03/2020    Hyperlipidemia     Hyperplasia, parathyroid (HCC)     Hypertension     Hypertensive urgency 10/27/2023    Hyponatremia 04/26/2023    Hypotension 04/13/2022    Kidney problem     Kidney stone     Left hip pain 10/05/2023    Leukocytosis 07/14/2020    Memory loss Julu2 2020    Motion sickness     Motion sickness     Multiple closed fractures of ribs of right side 03/22/2023    Nasal bones, closed fracture 03/22/2023    Neck pain     Obesity 1978    Obesity (BMI 30.0-34.9)     Other chest pain 09/13/2021    Other fatigue 09/21/2023    Peripheral neuropathy     Pollen allergies     RLS (restless legs syndrome)     S/P foot surgery 07/20/2022    SCC (squamous cell carcinoma) 05/04/2021    left mid forehead    SCC (squamous cell carcinoma) 2023    chest    Seasonal allergies     Shingles 01 05 23    Sleep apnea     has inspire    Squamous cell skin cancer 2007    left cheek     Swollen ankles     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, left     great toe fusion    Urinary incontinence     Urinary tract infection 03/28/2022    Wears glasses      Past Surgical History:   Procedure Laterality Date    ABDOMINAL SURGERY  1997,2015,1972    Nissen x2 1972 tubal ligarion    ARTHROSCOPY KNEE      BREAST BIOPSY      stereotactic-benign    BREAST BIOPSY      stereo-benign    BREAST CYST EXCISION Bilateral     benign     BREAST EXCISIONAL BIOPSY      unknown date-benign    BREAST EXCISIONAL BIOPSY      unknown date-benign    BREAST EXCISIONAL BIOPSY      unknown date-benign    BREAST EXCISIONAL BIOPSY      unknown age-benign    CARDIAC CATHETERIZATION      CHOLECYSTECTOMY      COLONOSCOPY      EXAMINATION UNDER ANESTHESIA N/A 06/24/2021    Procedure: EXAM UNDER ANESTHESIA (EUA), DISE;  Surgeon: Gregory Maier MD;  [facility];  Service: ENT    HERNIA REPAIR      HIATAL HERNIA REPAIR      KNEE SURGERY      Torn maniscus lap surg    LIPOSUCTION      LYMPH NODE BIOPSY      MOHS SURGERY  05/20/2021    left mid forehead-Mickey    MOHS SURGERY Left 05/27/2021    Left nasal tip- mickey     CA LIGATION/BIOPSY TEMPORAL ARTERY Left 01/05/2023    Procedure: BIOPSY ARTERY TEMPORAL;  Surgeon: Alec Dennis DO;  [facility];  Service: Vascular    CA OPEN IMPLANTATION CRANIAL NERVE BOOM & PULSE GEN N/A 11/10/2021    Procedure: INSERTION UPPER AIRWAY STIMULATOR, INSPIRE IMPLANT;  Surgeon: Gregory Maier MD;  [facility];  Service: ENT    CA UNLISTED PROCEDURE FOOT/TOES Right 07/19/2022    Procedure: 1st metatarsal phalangeal joint fusion;  Surgeon: Dede Bonner DPM;  [facility];  Service: Podiatry    REDUCTION MAMMAPLASTY Bilateral 2000    REDUCTION MAMMAPLASTY      SKIN BIOPSY      SKIN CANCER EXCISION  2007    squamous cell carcinoma     SKIN CANCER EXCISION  2007    basal cell carcinoma    SKIN CANCER EXCISION  2023    SCCIS chest    SQUAMOUS CELL CARCINOMA EXCISION      TOE SURGERY Right 08/04/2022    Right Great Toe Fusion    TONSILLECTOMY      TUBAL LIGATION      UPPER GASTROINTESTINAL ENDOSCOPY      US GUIDED BREAST BIOPSY RIGHT COMPLETE Right 07/18/2022     Family History   Problem Relation Age of Onset    Heart disease Mother     Depression Mother     Hypertension Mother     COPD Mother     Hearing loss Mother     Anxiety disorder Mother     Heart disease Father     Lung cancer Father 70         Smoker     Cancer Father         brain    Alcohol abuse Father     Dementia Father     Hypertension Father     Thyroid disease Father     COPD Father     Arthritis Father     Brain cancer Father 74    Hypertension Sister     Diabetes Sister     Heart disease Sister     Thyroid disease Sister     Cancer Sister         Lympoma, lung    Lung cancer Sister 79    Anxiety disorder Sister     Migraines Sister     Hypertension Brother     Diabetes Brother     Cancer Brother         Throat    Dementia Brother     Stroke Brother     Hypertension Brother     Heart disease Brother     Diabetes Brother     Hypertension Brother     No Known Problems Son     No Known Problems Son     Brain cancer Paternal Aunt         unknown age    Diabetes Sister     Hypertension Sister     Diabetes Brother     Breast cancer Neg Hx      Social History     Socioeconomic History    Marital status: /Civil Union     Spouse name: Not on file    Number of children: Not on file    Years of education: Not on file    Highest education level: Not on file   Occupational History    Occupation: RETIRED   Tobacco Use    Smoking status: Former     Current packs/day: 0.00     Average packs/day: 2.0 packs/day for 10.0 years (20.0 ttl pk-yrs)     Types: Cigarettes     Start date: 1966     Quit date: 1976     Years since quittin.0     Passive exposure: Past    Smokeless tobacco: Never    Tobacco comments:     Smoked 2 pack a day   Vaping Use    Vaping status: Never Used   Substance and Sexual Activity    Alcohol use: Yes     Comment: 1 or 2 a year    Drug use: No    Sexual activity: Not Currently     Partners: Male     Birth control/protection: Abstinence, Post-menopausal, Female Sterilization     Comment: defer   Other Topics Concern    Not on file   Social History Narrative    ** Merged History Encounter **          Social Determinants of Health     Financial Resource Strain: Low Risk  (11/10/2023)    Received from [facility]    Overall Financial Resource Strain (CARDIA)     Difficulty of Paying Living Expenses: Not very hard   Food Insecurity: No Food Insecurity (11/10/2023)    Received from [facility]    Hunger Vital Sign     Worried About Running Out of Food in the Last Year: Never true     Ran Out of Food in the Last Year: Never true   Transportation Needs: No Transportation Needs (11/10/2023)    Received from [facility]    PRAPARE - Transportation     Lack of Transportation (Medical): No     Lack of Transportation (Non-Medical): No   Physical Activity: Inactive (11/10/2023)    Received from [facility]    Exercise Vital Sign     Days of Exercise per Week: 0 days     Minutes of Exercise per Session: 0 min   Stress: Stress Concern Present (11/10/2023)    Received from [facility]    [place] [employer] - Occupational Stress Questionnaire     Feeling of Stress : To some extent   Social Connections: Moderately Integrated (11/10/2023)    Received from [facility]    Social Connection and Isolation Panel [NHANES]     Frequency of Communication with Friends and Family: More than three times a week     Frequency of Social Gatherings with Friends and Family: Twice a week     Attends Islam Services: More than 4 times per year     Active Member of Clubs or Organizations: No     Attends Club or Organization Meetings: Never     Marital Status:    Intimate Partner Violence: Not At Risk (11/10/2023)    Received from [facility]    Humiliation, Afraid, Rape, and Kick questionnaire     Fear of Current or Ex-Partner: No     Emotionally Abused: No     Physically Abused: No     Sexually Abused: No   Housing Stability: Low Risk  (11/10/2023)    Received from [facility]    Housing Stability Vital Sign     Unable to Pay for Housing in the Last Year: No     Number of Places Lived in the Last Year:  1     Unstable Housing in the Last Year: No     Past Surgical History:   Procedure Laterality Date    ABDOMINAL SURGERY  1997,2015,1972    Nissen x2 1972 tubal ligarion    ARTHROSCOPY KNEE      BREAST BIOPSY      stereotactic-benign    BREAST BIOPSY      stereo-benign    BREAST CYST EXCISION Bilateral     benign    BREAST EXCISIONAL BIOPSY      unknown date-benign    BREAST EXCISIONAL BIOPSY      unknown date-benign    BREAST EXCISIONAL BIOPSY      unknown date-benign    BREAST EXCISIONAL BIOPSY      unknown age-benign    CARDIAC CATHETERIZATION      CHOLECYSTECTOMY      COLONOSCOPY      EXAMINATION UNDER ANESTHESIA N/A 06/24/2021    Procedure: EXAM UNDER ANESTHESIA (EUA), DISE;  Surgeon: Gregory Maier MD;  [facility];  Service: ENT    HERNIA REPAIR      HIATAL HERNIA REPAIR      KNEE SURGERY      Torn maniscus lap surg    LIPOSUCTION      LYMPH NODE BIOPSY      MOHS SURGERY  05/20/2021    left mid forehead-Mickey    MOHS SURGERY Left 05/27/2021    Left nasal tip- mickey     KY LIGATION/BIOPSY TEMPORAL ARTERY Left 01/05/2023    Procedure: BIOPSY ARTERY TEMPORAL;  Surgeon: Alec Dennis DO;  [facility];  Service: Vascular    KY OPEN IMPLANTATION CRANIAL NERVE BOOM & PULSE GEN N/A 11/10/2021    Procedure: INSERTION UPPER AIRWAY STIMULATOR, INSPIRE IMPLANT;  Surgeon: Gregory Maier MD;  [facility];  Service: ENT    KY UNLISTED PROCEDURE FOOT/TOES Right 07/19/2022    Procedure: 1st metatarsal phalangeal joint fusion;  Surgeon: Dede Bonner DPM;  [facility];  Service: Podiatry    REDUCTION MAMMAPLASTY Bilateral 2000    REDUCTION MAMMAPLASTY      SKIN BIOPSY      SKIN CANCER EXCISION  2007    squamous cell carcinoma     SKIN CANCER EXCISION  2007    basal cell carcinoma    SKIN CANCER EXCISION  2023    SCCIS chest    SQUAMOUS CELL CARCINOMA EXCISION      TOE SURGERY Right 08/04/2022    Right Great Toe Fusion    TONSILLECTOMY      TUBAL LIGATION      UPPER  "GASTROINTESTINAL ENDOSCOPY      US GUIDED BREAST BIOPSY RIGHT COMPLETE Right 07/18/2022       OBJECTIVE:  Vitals:    01/10/24 1406   BP: 116/68   BP Location: Left arm   Patient Position: Sitting   Cuff Size: Standard   Pulse: 62   Temp: (!) 96.8 °F (36 °C)   TempSrc: Temporal   SpO2: 98%   Weight: 74.6 kg (164 lb 6.4 oz)   Height: 5' 2\" (1.575 m)     Body mass index is 30.07 kg/m².  Physical Exam    MoCA: 15/30      Labs & Imaging:  Lab Results   Component Value Date    WBC 10.51 (H) 12/22/2023    HGB 13.2 12/22/2023    HCT 40.3 12/22/2023    MCV 92 12/22/2023     12/22/2023     Lab Results   Component Value Date    SODIUM 140 12/22/2023    K 3.7 12/22/2023     12/22/2023    CO2 28 12/22/2023    AGAP 7 12/22/2023    BUN 32 (H) 12/22/2023    CREATININE 2.67 (H) 12/22/2023    GLUC 152 (H) 12/12/2023    GLUF 114 (H) 12/22/2023    CALCIUM 9.0 12/22/2023    AST 27 12/22/2023    ALT 30 12/22/2023    ALKPHOS 60 12/22/2023    TP 6.3 (L) 12/22/2023    TBILI 0.32 12/22/2023    EGFR 16 12/22/2023     Lab Results   Component Value Date    HGBA1C 7.1 (H) 10/28/2023     Lab Results   Component Value Date    CHOLESTEROL 141 11/22/2023    CHOLESTEROL 121 10/28/2023    CHOLESTEROL 131 08/16/2023     Lab Results   Component Value Date    HDL 41 (L) 11/22/2023    HDL 43 (L) 10/28/2023    HDL 35 (L) 08/16/2023     Lab Results   Component Value Date    TRIG 490 (H) 11/22/2023    TRIG 371 (H) 10/28/2023    TRIG 476 (H) 08/16/2023     Lab Results   Component Value Date    LDLCALC  11/22/2023      Comment:      Calculated LDL invalid, triglycerides >400 mg/dl    LDLCALC 4 10/28/2023    LDLDIRECT 28 11/22/2023    LDLDIRECT 37 08/16/2023     Lab Results   Component Value Date    VHHWNSJU93 373 10/29/2023     Lab Results   Component Value Date    MBW6MAEBADDQ 15.198 (H) 11/22/2023    TSH 0.89 03/06/2020     Lab Results   Component Value Date    [id number] 11.2 (L) 11/22/2023      Results for orders placed during the hospital " encounter of 11/07/23    CT head wo contrast    Narrative  CT BRAIN - WITHOUT CONTRAST    INDICATION:   Neuro deficit, acute, stroke suspected  confusion, elevated bp..    COMPARISON:  11/01/2023    TECHNIQUE:  CT examination of the brain was performed.  Multiplanar 2D reformatted images were created from the source data.    Radiation dose length product (DLP) for this visit:  984 mGy-cm .  This examination, like all CT scans performed in the [facility], was performed utilizing techniques to minimize radiation dose exposure, including the use of iterative  reconstruction and automated exposure control.    IMAGE QUALITY:  Diagnostic.    FINDINGS:    PARENCHYMA: Decreased attenuation is noted in periventricular and subcortical white matter demonstrating an appearance that is statistically most likely to represent mild microangiopathic change.    No CT signs of acute infarction.  No intracranial mass, mass effect or midline shift.  No acute parenchymal hemorrhage.    VENTRICLES AND EXTRA-AXIAL SPACES:  Normal for the patient's age.    VISUALIZED ORBITS: Normal visualized orbits.    PARANASAL SINUSES: Normal visualized paranasal sinuses.    CALVARIUM AND EXTRACRANIAL SOFT TISSUES:  Normal.    Impression  No acute intracranial abnormality.          Results for orders placed during the hospital encounter of 11/07/23    MRI brain wo contrast    Narrative  MRI BRAIN WITHOUT CONTRAST    INDICATION: headaches.    COMPARISON:   CT head without contrast 11/7/2023 and MRI brain 10/30/2023. CTA head and neck 10/27/2023.    TECHNIQUE:  Multiplanar, multisequence imaging of the brain was performed.  Imaging performed on 1.5T MRI    IMAGE QUALITY:  Diagnostic.    FINDINGS:    BRAIN PARENCHYMA:    Previously noted punctate focus of restricted diffusion in the right putamen has resolved. There is no residual edema within the right basal ganglia.    There is no discrete mass, mass effect or midline shift. There is no  intracranial hemorrhage.  There is no evidence of acute infarction and diffusion imaging is unremarkable.    Small scattered hyperintensities on T2/FLAIR imaging are noted in the periventricular and subcortical white matter demonstrating an appearance that is statistically most likely to represent moderate microangiopathic change.    VENTRICLES:  Ventricles and extra-axial CSF spaces are prominent commensurate with the degree of volume loss. Symmetric prominent sulci in the anterior superior cerebellar hemispheres, likely a normal anatomic variation. No hydrocephalus.  No acute  intraventricular hemorrhage.    SELLA AND PITUITARY GLAND: The    ORBITS:  Normal.    PARANASAL SINUSES:  Normal.    VASCULATURE:  Evaluation of the major intracranial vasculature demonstrates appropriate flow voids.    CALVARIUM AND SKULL BASE: Unremarkable.      EXTRACRANIAL SOFT TISSUES:  Normal.    Impression  No acute infarction.    Stable moderate cerebral chronic microangiopathic changes when comparing to the October 30, 2023 study.        Results for orders placed during the hospital encounter of 10/26/23    MRI brain NeuroQuant wo contrast    Narrative  MRI BRAIN WITHOUT CONTRAST, [facility] IMAGING    INDICATION: Mild chronic microvascular ischemic change..    COMPARISON:   None.    TECHNIQUE:  Multiplanar, multisequence imaging of the brain was performed.  Sagittal 3D volumetric imaging processed by [employer] software creating General Morphology and Age Related Atrophy reports.      IMAGE QUALITY:  Diagnostic.    FINDINGS:    BRAIN PARENCHYMA:  There is no discrete mass, mass effect or midline shift.  Brainstem and cerebellum demonstrate normal signal. There is no intracranial hemorrhage.  There is no evidence of acute infarction and diffusion imaging is unremarkable. Mild  chronic microvascular ischemic change.    QUANTITATIVE:    Exam Quality: Adequate for volumetric analysis.    Hippocampus  Hippocampal Occupancy Score (HOC):                    0.6  Percentile for similar age:                              4    Total hippocampal volume (cc):                           5.45  Percentile for similar age:                              39    Entorhinal cortex (cc)                                            4.82  Percentile for similar age:                                   78    Superior Lateral ventricular volume (cc):             67.65  Percentile for similar age:                             99    Inferior lateral ventricular volume (cc):                    3.58  Percentile for similar age:                                98    Quantitative conclusions:  Hippocampal Volume:                       Normal volume  Entorhinal Volume:                            Normal volume  Superior Lateral Ventricle Volume:     Normal Volume  Inferior Lateral Ventricle Volume:       Normal Volume    Concordance between qualitative and quantitative hippocampal volume assessment: Concordant.    Change in brain volumes: No previous volumetric study for comparison    Mean hippocampal volume loss among normal elderly: 0.7% per year, (-0.3 to 1.7;  Edy 2008; also Lincoln 2010).    VENTRICLES:  Normal for the patient's age.    SELLA AND PITUITARY GLAND:  Normal.    ORBITS:  Normal.    PARANASAL SINUSES:  Normal.    VASCULATURE:  Evaluation of the major intracranial vasculature demonstrates appropriate flow voids.    CALVARIUM AND SKULL BASE:  Normal.    EXTRACRANIAL SOFT TISSUES:  Normal.    Impression  No mass effect, acute intracranial hemorrhage or evidence of recent infarction. Mild chronic microvascular ischemic change.    NeuroQuant analysis was performed: Normal hippocampal volume and enlarged ventricular system: Findings do not support hippocampal degeneration. Possible expansion of ventricular system without medial temporal lobe focused ex-vacuo process.          I have spent 89 minutes with Patient and family today including taking history from family,  patient, review of records, charting, and counseling regarding diagnosis and management of conditions.

## 2024-01-10 NOTE — ASSESSMENT & PLAN NOTE
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  As well, considering blood pressure issues, would recommend moving away from hydralazine and starting daily torsemide daily.  As well recommend tapering PPI dosing considering CKD IV.  Continue current insulin, spironolactone.

## 2024-01-10 NOTE — ASSESSMENT & PLAN NOTE
Exact cause of dementia is unclear, but luckily recent MoCA testing has improved, now 15/30 from previous 12/30 in December and 7/30 in October.  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  She does not seem like a typical Alzheimer's patient. Considering significant metabolic medical history with CKD IV and IDDM, these may be playing a significant role in her cognitive health.  I would not recommend cholinergic therapy considering polypharmacy and CKD IV.    Decision-making capacity: Does not have capacity for complex medical or financial decisions    Staging: Mild Stage Dementia (unspecified type)    Medications Review: Reviewed current polypharmacy.  Will stop duloxetine considering renal function.  As well would recommend reducing statin therapy as I do not detect significant atherosclerotic disease.    Caregiver Review: Low caregiver strain

## 2024-01-10 NOTE — ASSESSMENT & PLAN NOTE
Likely can simply take oral vitamin B12 supplements.  Should not need B12 injections as she was able to do very well in her levels previously with oral pills.  As well methylmalonic acid will not be a useful marker considering CKD 4.

## 2024-01-10 NOTE — PATIENT INSTRUCTIONS
ASSESSMENT AND PLAN:  1. Mild dementia without behavioral disturbance, psychotic disturbance, mood disturbance, or anxiety, unspecified dementia type (HCC)  Assessment & Plan:  Exact cause of dementia is unclear, but luckily recent MoCA testing has improved, now 15/30 from previous 12/30 in December and 7/30 in October.  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  She does not seem like a typical Alzheimer's patient. Considering significant metabolic medical history with CKD IV and IDDM, these may be playing a significant role in her cognitive health.  I would not recommend cholinergic therapy considering polypharmacy and CKD IV.    Decision-making capacity: Does not have capacity for complex medical or financial decisions    Staging: Mild Stage Dementia (unspecified type)    Medications Review: Reviewed current polypharmacy.  Will stop duloxetine considering renal function.  As well would recommend reducing statin therapy as I do not detect significant atherosclerotic disease.    Caregiver Review: Low caregiver strain      2. Cerebrovascular accident (CVA), unspecified mechanism (HCC)  -     Ambulatory Referral to [facility]    3. Essential hypertension  -     torsemide (DEMADEX) 10 mg tablet; Take 0.5 tablets (5 mg total) by mouth daily as needed (weight gain)    4. Type 2 diabetes mellitus with stage 4 chronic kidney disease, with long-term current use of insulin (HCC)  Assessment & Plan:  Would recommend decreasing losartan therapy considering CKD IV verging on CKD V.  As well, considering blood pressure issues, would recommend moving away from hydralazine and starting daily torsemide daily.  As well recommend tapering PPI dosing considering CKD IV.  Continue current insulin, spironolactone.      5. Mixed hyperlipidemia  Assessment & Plan:  LDL is exceedingly low and CTA does not show significant atherosclerotic disease.  Recommend decreasing statin therapy to atorvastatin 10 mg  daily.      6. History of cerebrovascular accident (CVA) due to ischemia  Assessment & Plan:  Related to blood pressure issues, not atherosclerosis.  Continue to titrate BP medications.      7. Slow transit constipation  -     senna (SENOKOT) 8.6 mg; Take 1 tablet (8.6 mg total) by mouth in the morning    8. Gastroesophageal reflux disease without esophagitis  Assessment & Plan:  Likely complicated by constipation.  Start senna.  Would recommend decreasing PPI dose as this may complicated CKD IV as well.      9. B12 deficiency  Assessment & Plan:  Likely can simply take oral vitamin B12 supplements.  Should not need B12 injections as she was able to do very well in her levels previously with oral pills.  As well methylmalonic acid will not be a useful marker considering CKD 4.    Orders:  -     vitamin B-12 (VITAMIN B-12) 1,000 mcg tablet; Take 1 tablet (1,000 mcg total) by mouth daily    10. RLS (restless legs syndrome)  Assessment & Plan:  Stable.  Continue pramipexole      11. Acquired hypothyroidism  Assessment & Plan:  Would recommend TSH goal 3-8.  Continue with levothyroxine      12. SHAYAN (obstructive sleep apnea)  Assessment & Plan:  Does not like the Inspire therapy as it wakes her up.            HPI:    We had the pleasure of evaluating Trina Davis who is a 73 y.o. female in Geriatric consultation today.  Ms. Davis is in the office with her  Bruce.  She lives with her  and their son.    HISTORY AS PER  ANTONELLA:  Reports decline in cognition in the last few years with many episodes of acute confusion with repeated antibiotics for possible UTIs and then increasing oral fluid intake which seemed to help some.  As well, had a small CVA October.  He     COGNITION:  Memory Issues noticed since 3 year(s)    Memory affected: short term memory loss    Symptoms started: gradual  Over time the memory has:  worsened  Memory issue(s) were noted by: family   Difficulty finding the right word while  speaking: No  Requires repeat information or asking the same question repeatedly: Yes  Fluctuation in alertness: No  Changes in mood or personality:No  Current or previous treatment for depression or anxiety: Yes    Family member with dementia and what type? no  History of head trauma: No  History of stroke: Yes  History of alcohol or substance abuse: No    FUNCTIONAL STATUS:  BADLs  Does patient require assistance with:  Bathing: No  Dressing: No  Transferring: No  Continence: No  Toileting: No  Feeding: No    IADLs  Dose patient require assistance with:  Telephone: No  Shopping: Yes  Food Preparation: No  Housekeeping: No  Laundry: No  Transportation: Yes, stopped driving a while ago  Medications: Yes,  fills pill boxes  Finances: Yes    NEUROPSYCH SYMPTOMS:  Does patient get angry or hostile?  Resist care from others? No  Does patient see or hear things that no one else can see or hear? No  Does patient act impatient and cranky? Does mood frequently change for no apparent reason? No  Does patient act suspicious without good reason (example: believes that others are stealing from him or her, or planning to harm him or her in some way)? No  Does patient less interested in his or her usual activities or in the activities and plans of others? No  Does patient have trouble sleeping at night? No  Dose patient have abnormal movements while asleep? Yes    SAFETY:  Hearing and vision issue: Yes, with significant cataracts, planning on surgery  Any gait or balance disorder: Yes, only  a little shakiness when getting up  Uses: walker for longer distances outside the house  Any falls in the last year: Yes  Any history of wandering: No  Are there firearms or guns in the home: No  Does patient drive: No  Any driving accidents or citations in the last year: No  Any concerns about patient's ability to drive: Yes    ACP REVIEW:  Does patient have POA: Yes  Does patient have a Living will: Yes  Any legal assistance needed  for healthcare planning?: No    Allergies   Allergen Reactions    Ciprofloxacin Hives    Sulfamethoxazole-Trimethoprim Tongue Swelling       Medications:    Current Outpatient Medications on File Prior to Visit   Medication Sig Dispense Refill    albuterol (Ventolin HFA) 90 mcg/act inhaler Inhale 2 puffs every 6 (six) hours as needed for wheezing 54 g 0    amLODIPine (NORVASC) 5 mg tablet Take 1 tablet (5 mg total) by mouth daily 90 tablet 1    aspirin (ECOTRIN LOW STRENGTH) 81 mg EC tablet Take 1 tablet (81 mg total) by mouth daily Do not start before October 31, 2023.  0    B-D ULTRAFINE III SHORT PEN 31G X 8 MM MISC   3    Calcium Citrate 1040 MG TABS Take 500 mg by mouth Three times a day      cholecalciferol (VITAMIN D3) 1,000 units tablet Take 2 tablets (2,000 Units total) by mouth daily (Patient taking differently: Take 2,000 Units by mouth daily On hold for eight weeks then start again) 180 tablet 1    Coenzyme Q10 (CoQ10) 100 MG CAPS Take 100 mg by mouth in the morning Bed time      ergocalciferol (VITAMIN D2) 50,000 units Take 1 capsule (50,000 Units total) by mouth once a week for 8 doses 8 capsule 0    famotidine (PEPCID) 10 mg tablet Take 1 tablet (10 mg total) by mouth daily Do not start before September 13, 2023. 30 tablet 0    ferrous sulfate 324 MG TBEC TAKE 1 TABLET (324 MG TOTAL) BY MOUTH EVERY OTHER DAY 45 tablet 0    fluticasone (FLONASE) 50 mcg/act nasal spray 1 spray into each nostril daily for 14 days 11.1 mL 1    gabapentin (NEURONTIN) 100 mg capsule Take 200 mg by mouth daily at bedtime      hydrALAZINE (APRESOLINE) 25 mg tablet TAKE 1 TABLET BY MOUTH TWICE A  tablet 2    Icosapent Ethyl 1 g CAPS Take 2 capsules (2 g total) by mouth 2 (two) times a day 120 capsule 3    insulin aspart (NovoLOG) 100 units/mL injection Inject under the skin 3 (three) times a day before meals 18 units, 18 units, 22 units.      insulin detemir (Levemir FlexTouch) 100 Units/mL injection pen Inject 50 Units  under the skin every evening       Lactobacillus (PROBIOTIC ACIDOPHILUS PO) Take 1 capsule by mouth 2 (two) times a day      levothyroxine 137 mcg tablet Take 137 mcg by mouth daily      losartan (COZAAR) 50 mg tablet Take 1 tablet (50 mg total) by mouth 2 (two) times a day 180 tablet 0    methenamine hippurate (HIPREX) 1 g tablet Take 1 tablet (1 g total) by mouth 2 (two) times a day with meals 180 tablet 3    metoprolol tartrate (LOPRESSOR) 50 mg tablet Take 1 tablet (50 mg total) by mouth every 12 (twelve) hours 180 tablet 3    pantoprazole (PROTONIX) 40 mg tablet Take 1 tablet (40 mg total) by mouth 2 (two) times a day 180 tablet 1    pramipexole (MIRAPEX) 0.25 mg tablet Take 1 tablet (0.25 mg total) by mouth daily at bedtime 90 tablet 0    Restasis 0.05 % ophthalmic emulsion Administer 1 drop to both eyes every 12 (twelve) hours      rosuvastatin (CRESTOR) 10 MG tablet Take 10 mg by mouth daily      spironolactone (ALDACTONE) 25 mg tablet Take 1 tablet (25 mg total) by mouth daily 30 tablet 5    [DISCONTINUED] acetaminophen (TYLENOL) 325 mg tablet Take 3 tablets (975 mg total) by mouth every 8 (eight) hours  0    [DISCONTINUED] DULoxetine (CYMBALTA) 30 mg delayed release capsule Take 1 capsule (30 mg total) by mouth daily  0    [DISCONTINUED] torsemide (DEMADEX) 10 mg tablet Take 0.5 tablets (5 mg total) by mouth every other day Take this medication only if your weight exceeds 172 pounds 30 tablet 0    [DISCONTINUED] levothyroxine 125 mcg tablet Take 1 tablet (125 mcg total) by mouth daily in the early morning Do not start before October 31, 2023.  0    [DISCONTINUED] rosuvastatin (CRESTOR) 5 mg tablet Take 1 tablet (5 mg total) by mouth daily 90 tablet 3     Current Facility-Administered Medications on File Prior to Visit   Medication Dose Route Frequency Provider Last Rate Last Admin    [DISCONTINUED] cyanocobalamin injection 1,000 mcg  1,000 mcg Intramuscular Weekly Maryse Rae MD   1,000 mcg at  "01/09/24 0954       History:  Past Medical History:   Diagnosis Date    Actinic keratosis     Acute cystitis without hematuria 04/28/2023    Acute cystitis without hematuria 04/28/2023    Acute-on-chronic kidney injury      NIKOS (acute kidney injury) (HCC) 05/08/2020    Allergic     Allergic rhinitis     Ambulatory dysfunction 10/22/2020    AMS (altered mental status) 07/14/2020    Anesthesia     pt reports \"had to use double lumen endo tube for hiatal hernia repair/so surgeon could get to where he needed to work\"    Arthritis     Aspiration into airway     Michael esophagus 1993    Lot of stress with children    Basal cell carcinoma 2007    left cheek     BCC (basal cell carcinoma) 05/27/2021    Left Nasal tip    Breast discharge 06/19/2023    Breast pain 06/19/2023    Cancer (HCC)     squamous cell cancer on forhead    Cancer (HCC)     basal cell on nose     Cholelithiasis     Chronic kidney disease 2000, 2018    Stones, kidney disease stage 4    Chronic pain disorder     bilat feet and joint pain on occas    Colon polyp     Confusion 10/30/2023    Constipation     COPD (chronic obstructive pulmonary disease) (HCC)     COVID-19 08/2021    recovered at home/did receive monoclonal infusion    COVID-19 virus infection 08/16/2021    Dental bridge present     Depression     Diabetes mellitus (HCC)     Type 2    Diabetic neuropathy (HCC)     Difficulty walking 2017    Disease of thyroid gland     Dyspnea     Elevated liver enzymes 04/04/2023    Epigastric abdominal pain with severe diabetic gastroparesis, status post EGD/Botox injection, 5/14 05/17/2021    Facial abrasion, initial encounter 03/22/2023    Fall 03/22/2023    Family history of thyroid problem     Fatty liver     Fibromyalgia, primary     Fracture of orbital floor, blow-out, right, closed, with routine healing, subsequent encounter 03/22/2023    GERD (gastroesophageal reflux disease)     Heart burn     Hiatal hernia     History of cholecystectomy 10/27/2023 "    History of colon polyps 07/30/2021    History of kidney stones 09/29/2020    Hx of recurrent kidney stones    History of kidney stones 09/29/2020    Hx of recurrent kidney stones  Formatting of this note might be different from the original. Hx of recurrent kidney stones  Last Assessment & Plan:  Formatting of this note might be different from the original. We will set her up for follow-up in 3 months with a KUB prior.  She knows to call if she has any kidney stone type pain in the meantime.    History of Nissen fundoplication 10/27/2023    History of pneumonia     History of repair of hiatal hernia 05/03/2020    Hyperlipidemia     Hyperplasia, parathyroid (HCC)     Hypertension     Hypertensive urgency 10/27/2023    Hyponatremia 04/26/2023    Hypotension 04/13/2022    Kidney problem     Kidney stone     Left hip pain 10/05/2023    Leukocytosis 07/14/2020    Memory loss Julu2 2020    Motion sickness     Motion sickness     Multiple closed fractures of ribs of right side 03/22/2023    Nasal bones, closed fracture 03/22/2023    Neck pain     Obesity 1978    Obesity (BMI 30.0-34.9)     Other chest pain 09/13/2021    Other fatigue 09/21/2023    Peripheral neuropathy     Pollen allergies     RLS (restless legs syndrome)     S/P foot surgery 07/20/2022    SCC (squamous cell carcinoma) 05/04/2021    left mid forehead    SCC (squamous cell carcinoma) 2023    chest    Seasonal allergies     Shingles 01 05 23    Sleep apnea     has inspire    Squamous cell skin cancer 2007    left cheek     Swollen ankles     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, left     great toe fusion    Urinary incontinence     Urinary tract infection 03/28/2022    Wears glasses      Past Surgical History:   Procedure Laterality Date    ABDOMINAL SURGERY  1997,2015,1972    Nissen x2 1972 tubal ligarion    ARTHROSCOPY KNEE      BREAST BIOPSY      stereotactic-benign    BREAST BIOPSY      stereo-benign    BREAST CYST EXCISION Bilateral     benign     BREAST EXCISIONAL BIOPSY      unknown date-benign    BREAST EXCISIONAL BIOPSY      unknown date-benign    BREAST EXCISIONAL BIOPSY      unknown date-benign    BREAST EXCISIONAL BIOPSY      unknown age-benign    CARDIAC CATHETERIZATION      CHOLECYSTECTOMY      COLONOSCOPY      EXAMINATION UNDER ANESTHESIA N/A 06/24/2021    Procedure: EXAM UNDER ANESTHESIA (EUA), DISE;  Surgeon: Gregory Miaer MD;  [facility];  Service: ENT    HERNIA REPAIR      HIATAL HERNIA REPAIR      KNEE SURGERY      Torn maniscus lap surg    LIPOSUCTION      LYMPH NODE BIOPSY      MOHS SURGERY  05/20/2021    left mid forehead-Mickey    MOHS SURGERY Left 05/27/2021    Left nasal tip- mickey     AL LIGATION/BIOPSY TEMPORAL ARTERY Left 01/05/2023    Procedure: BIOPSY ARTERY TEMPORAL;  Surgeon: Alec Dennis DO;  [facility];  Service: Vascular    AL OPEN IMPLANTATION CRANIAL NERVE BOOM & PULSE GEN N/A 11/10/2021    Procedure: INSERTION UPPER AIRWAY STIMULATOR, INSPIRE IMPLANT;  Surgeon: Gregory Maier MD;  [facility];  Service: ENT    AL UNLISTED PROCEDURE FOOT/TOES Right 07/19/2022    Procedure: 1st metatarsal phalangeal joint fusion;  Surgeon: Dede Bonner DPM;  [facility];  Service: Podiatry    REDUCTION MAMMAPLASTY Bilateral 2000    REDUCTION MAMMAPLASTY      SKIN BIOPSY      SKIN CANCER EXCISION  2007    squamous cell carcinoma     SKIN CANCER EXCISION  2007    basal cell carcinoma    SKIN CANCER EXCISION  2023    SCCIS chest    SQUAMOUS CELL CARCINOMA EXCISION      TOE SURGERY Right 08/04/2022    Right Great Toe Fusion    TONSILLECTOMY      TUBAL LIGATION      UPPER GASTROINTESTINAL ENDOSCOPY      US GUIDED BREAST BIOPSY RIGHT COMPLETE Right 07/18/2022     Family History   Problem Relation Age of Onset    Heart disease Mother     Depression Mother     Hypertension Mother     COPD Mother     Hearing loss Mother     Anxiety disorder Mother     Heart disease Father     Lung cancer Father 70         Smoker     Cancer Father         brain    Alcohol abuse Father     Dementia Father     Hypertension Father     Thyroid disease Father     COPD Father     Arthritis Father     Brain cancer Father 74    Hypertension Sister     Diabetes Sister     Heart disease Sister     Thyroid disease Sister     Cancer Sister         Lympoma, lung    Lung cancer Sister 79    Anxiety disorder Sister     Migraines Sister     Hypertension Brother     Diabetes Brother     Cancer Brother         Throat    Dementia Brother     Stroke Brother     Hypertension Brother     Heart disease Brother     Diabetes Brother     Hypertension Brother     No Known Problems Son     No Known Problems Son     Brain cancer Paternal Aunt         unknown age    Diabetes Sister     Hypertension Sister     Diabetes Brother     Breast cancer Neg Hx      Social History     Socioeconomic History    Marital status: /Civil Union     Spouse name: Not on file    Number of children: Not on file    Years of education: Not on file    Highest education level: Not on file   Occupational History    Occupation: RETIRED   Tobacco Use    Smoking status: Former     Current packs/day: 0.00     Average packs/day: 2.0 packs/day for 10.0 years (20.0 ttl pk-yrs)     Types: Cigarettes     Start date: 1966     Quit date: 1976     Years since quittin.0     Passive exposure: Past    Smokeless tobacco: Never    Tobacco comments:     Smoked 2 pack a day   Vaping Use    Vaping status: Never Used   Substance and Sexual Activity    Alcohol use: Yes     Comment: 1 or 2 a year    Drug use: No    Sexual activity: Not Currently     Partners: Male     Birth control/protection: Abstinence, Post-menopausal, Female Sterilization     Comment: defer   Other Topics Concern    Not on file   Social History Narrative    ** Merged History Encounter **          Social Determinants of Health     Financial Resource Strain: Low Risk  (11/10/2023)    Received from [facility]    Overall Financial Resource Strain (CARDIA)     Difficulty of Paying Living Expenses: Not very hard   Food Insecurity: No Food Insecurity (11/10/2023)    Received from [facility]    Hunger Vital Sign     Worried About Running Out of Food in the Last Year: Never true     Ran Out of Food in the Last Year: Never true   Transportation Needs: No Transportation Needs (11/10/2023)    Received from [facility]    PRAPARE - Transportation     Lack of Transportation (Medical): No     Lack of Transportation (Non-Medical): No   Physical Activity: Inactive (11/10/2023)    Received from [facility]    Exercise Vital Sign     Days of Exercise per Week: 0 days     Minutes of Exercise per Session: 0 min   Stress: Stress Concern Present (11/10/2023)    Received from [facility]    [place] [employer] - Occupational Stress Questionnaire     Feeling of Stress : To some extent   Social Connections: Moderately Integrated (11/10/2023)    Received from [facility]    Social Connection and Isolation Panel [NHANES]     Frequency of Communication with Friends and Family: More than three times a week     Frequency of Social Gatherings with Friends and Family: Twice a week     Attends Caodaism Services: More than 4 times per year     Active Member of Clubs or Organizations: No     Attends Club or Organization Meetings: Never     Marital Status:    Intimate Partner Violence: Not At Risk (11/10/2023)    Received from [facility]    Humiliation, Afraid, Rape, and Kick questionnaire     Fear of Current or Ex-Partner: No     Emotionally Abused: No     Physically Abused: No     Sexually Abused: No   Housing Stability: Low Risk  (11/10/2023)    Received from [facility]    Housing Stability Vital Sign     Unable to Pay for Housing in the Last Year: No     Number of Places Lived in the Last Year:  1     Unstable Housing in the Last Year: No     Past Surgical History:   Procedure Laterality Date    ABDOMINAL SURGERY  1997,2015,1972    Nissen x2 1972 tubal ligarion    ARTHROSCOPY KNEE      BREAST BIOPSY      stereotactic-benign    BREAST BIOPSY      stereo-benign    BREAST CYST EXCISION Bilateral     benign    BREAST EXCISIONAL BIOPSY      unknown date-benign    BREAST EXCISIONAL BIOPSY      unknown date-benign    BREAST EXCISIONAL BIOPSY      unknown date-benign    BREAST EXCISIONAL BIOPSY      unknown age-benign    CARDIAC CATHETERIZATION      CHOLECYSTECTOMY      COLONOSCOPY      EXAMINATION UNDER ANESTHESIA N/A 06/24/2021    Procedure: EXAM UNDER ANESTHESIA (EUA), DISE;  Surgeon: Gregory Maier MD;  [facility];  Service: ENT    HERNIA REPAIR      HIATAL HERNIA REPAIR      KNEE SURGERY      Torn maniscus lap surg    LIPOSUCTION      LYMPH NODE BIOPSY      MOHS SURGERY  05/20/2021    left mid forehead-Mickey    MOHS SURGERY Left 05/27/2021    Left nasal tip- mickey     GA LIGATION/BIOPSY TEMPORAL ARTERY Left 01/05/2023    Procedure: BIOPSY ARTERY TEMPORAL;  Surgeon: Alec Dennis DO;  [facility];  Service: Vascular    GA OPEN IMPLANTATION CRANIAL NERVE BOOM & PULSE GEN N/A 11/10/2021    Procedure: INSERTION UPPER AIRWAY STIMULATOR, INSPIRE IMPLANT;  Surgeon: Gregory Maier MD;  [facility];  Service: ENT    GA UNLISTED PROCEDURE FOOT/TOES Right 07/19/2022    Procedure: 1st metatarsal phalangeal joint fusion;  Surgeon: Dede Bonner DPM;  [facility];  Service: Podiatry    REDUCTION MAMMAPLASTY Bilateral 2000    REDUCTION MAMMAPLASTY      SKIN BIOPSY      SKIN CANCER EXCISION  2007    squamous cell carcinoma     SKIN CANCER EXCISION  2007    basal cell carcinoma    SKIN CANCER EXCISION  2023    SCCIS chest    SQUAMOUS CELL CARCINOMA EXCISION      TOE SURGERY Right 08/04/2022    Right Great Toe Fusion    TONSILLECTOMY      TUBAL LIGATION      UPPER  "GASTROINTESTINAL ENDOSCOPY      US GUIDED BREAST BIOPSY RIGHT COMPLETE Right 07/18/2022       OBJECTIVE:  Vitals:    01/10/24 1406   BP: 116/68   BP Location: Left arm   Patient Position: Sitting   Cuff Size: Standard   Pulse: 62   Temp: (!) 96.8 °F (36 °C)   TempSrc: Temporal   SpO2: 98%   Weight: 74.6 kg (164 lb 6.4 oz)   Height: 5' 2\" (1.575 m)     Body mass index is 30.07 kg/m².  Physical Exam    MoCA: 15/30      Labs & Imaging:  Lab Results   Component Value Date    WBC 10.51 (H) 12/22/2023    HGB 13.2 12/22/2023    HCT 40.3 12/22/2023    MCV 92 12/22/2023     12/22/2023     Lab Results   Component Value Date    SODIUM 140 12/22/2023    K 3.7 12/22/2023     12/22/2023    CO2 28 12/22/2023    AGAP 7 12/22/2023    BUN 32 (H) 12/22/2023    CREATININE 2.67 (H) 12/22/2023    GLUC 152 (H) 12/12/2023    GLUF 114 (H) 12/22/2023    CALCIUM 9.0 12/22/2023    AST 27 12/22/2023    ALT 30 12/22/2023    ALKPHOS 60 12/22/2023    TP 6.3 (L) 12/22/2023    TBILI 0.32 12/22/2023    EGFR 16 12/22/2023     Lab Results   Component Value Date    HGBA1C 7.1 (H) 10/28/2023     Lab Results   Component Value Date    CHOLESTEROL 141 11/22/2023    CHOLESTEROL 121 10/28/2023    CHOLESTEROL 131 08/16/2023     Lab Results   Component Value Date    HDL 41 (L) 11/22/2023    HDL 43 (L) 10/28/2023    HDL 35 (L) 08/16/2023     Lab Results   Component Value Date    TRIG 490 (H) 11/22/2023    TRIG 371 (H) 10/28/2023    TRIG 476 (H) 08/16/2023     Lab Results   Component Value Date    LDLCALC  11/22/2023      Comment:      Calculated LDL invalid, triglycerides >400 mg/dl    LDLCALC 4 10/28/2023    LDLDIRECT 28 11/22/2023    LDLDIRECT 37 08/16/2023     Lab Results   Component Value Date    VFIHYXOT76 373 10/29/2023     Lab Results   Component Value Date    PCH3NCUWBLFM 15.198 (H) 11/22/2023    TSH 0.89 03/06/2020     Lab Results   Component Value Date    [id number] 11.2 (L) 11/22/2023      Results for orders placed during the hospital " encounter of 11/07/23    CT head wo contrast    Narrative  CT BRAIN - WITHOUT CONTRAST    INDICATION:   Neuro deficit, acute, stroke suspected  confusion, elevated bp..    COMPARISON:  11/01/2023    TECHNIQUE:  CT examination of the brain was performed.  Multiplanar 2D reformatted images were created from the source data.    Radiation dose length product (DLP) for this visit:  984 mGy-cm .  This examination, like all CT scans performed in the [facility], was performed utilizing techniques to minimize radiation dose exposure, including the use of iterative  reconstruction and automated exposure control.    IMAGE QUALITY:  Diagnostic.    FINDINGS:    PARENCHYMA: Decreased attenuation is noted in periventricular and subcortical white matter demonstrating an appearance that is statistically most likely to represent mild microangiopathic change.    No CT signs of acute infarction.  No intracranial mass, mass effect or midline shift.  No acute parenchymal hemorrhage.    VENTRICLES AND EXTRA-AXIAL SPACES:  Normal for the patient's age.    VISUALIZED ORBITS: Normal visualized orbits.    PARANASAL SINUSES: Normal visualized paranasal sinuses.    CALVARIUM AND EXTRACRANIAL SOFT TISSUES:  Normal.    Impression  No acute intracranial abnormality.          Results for orders placed during the hospital encounter of 11/07/23    MRI brain wo contrast    Narrative  MRI BRAIN WITHOUT CONTRAST    INDICATION: headaches.    COMPARISON:   CT head without contrast 11/7/2023 and MRI brain 10/30/2023. CTA head and neck 10/27/2023.    TECHNIQUE:  Multiplanar, multisequence imaging of the brain was performed.  Imaging performed on 1.5T MRI    IMAGE QUALITY:  Diagnostic.    FINDINGS:    BRAIN PARENCHYMA:    Previously noted punctate focus of restricted diffusion in the right putamen has resolved. There is no residual edema within the right basal ganglia.    There is no discrete mass, mass effect or midline shift. There is no  intracranial hemorrhage.  There is no evidence of acute infarction and diffusion imaging is unremarkable.    Small scattered hyperintensities on T2/FLAIR imaging are noted in the periventricular and subcortical white matter demonstrating an appearance that is statistically most likely to represent moderate microangiopathic change.    VENTRICLES:  Ventricles and extra-axial CSF spaces are prominent commensurate with the degree of volume loss. Symmetric prominent sulci in the anterior superior cerebellar hemispheres, likely a normal anatomic variation. No hydrocephalus.  No acute  intraventricular hemorrhage.    SELLA AND PITUITARY GLAND: The    ORBITS:  Normal.    PARANASAL SINUSES:  Normal.    VASCULATURE:  Evaluation of the major intracranial vasculature demonstrates appropriate flow voids.    CALVARIUM AND SKULL BASE: Unremarkable.      EXTRACRANIAL SOFT TISSUES:  Normal.    Impression  No acute infarction.    Stable moderate cerebral chronic microangiopathic changes when comparing to the October 30, 2023 study.        Results for orders placed during the hospital encounter of 10/26/23    MRI brain NeuroQuant wo contrast    Narrative  MRI BRAIN WITHOUT CONTRAST, NeuroQuant IMAGING    INDICATION: Mild chronic microvascular ischemic change..    COMPARISON:   None.    TECHNIQUE:  Multiplanar, multisequence imaging of the brain was performed.  Sagittal 3D volumetric imaging processed by Fl[employer] software creating General Morphology and Age Related Atrophy reports.      IMAGE QUALITY:  Diagnostic.    FINDINGS:    BRAIN PARENCHYMA:  There is no discrete mass, mass effect or midline shift.  Brainstem and cerebellum demonstrate normal signal. There is no intracranial hemorrhage.  There is no evidence of acute infarction and diffusion imaging is unremarkable. Mild  chronic microvascular ischemic change.    QUANTITATIVE:    Exam Quality: Adequate for volumetric analysis.    Hippocampus  Hippocampal Occupancy Score (HOC):                    0.6  Percentile for similar age:                              4    Total hippocampal volume (cc):                           5.45  Percentile for similar age:                              39    Entorhinal cortex (cc)                                            4.82  Percentile for similar age:                                   78    Superior Lateral ventricular volume (cc):             67.65  Percentile for similar age:                             99    Inferior lateral ventricular volume (cc):                    3.58  Percentile for similar age:                                98    Quantitative conclusions:  Hippocampal Volume:                       Normal volume  Entorhinal Volume:                            Normal volume  Superior Lateral Ventricle Volume:     Normal Volume  Inferior Lateral Ventricle Volume:       Normal Volume    Concordance between qualitative and quantitative hippocampal volume assessment: Concordant.    Change in brain volumes: No previous volumetric study for comparison    Mean hippocampal volume loss among normal elderly: 0.7% per year, (-0.3 to 1.7;  Edy 2008; also Lincoln 2010).    VENTRICLES:  Normal for the patient's age.    SELLA AND PITUITARY GLAND:  Normal.    ORBITS:  Normal.    PARANASAL SINUSES:  Normal.    VASCULATURE:  Evaluation of the major intracranial vasculature demonstrates appropriate flow voids.    CALVARIUM AND SKULL BASE:  Normal.    EXTRACRANIAL SOFT TISSUES:  Normal.    Impression  No mass effect, acute intracranial hemorrhage or evidence of recent infarction. Mild chronic microvascular ischemic change.    NeuroQuant analysis was performed: Normal hippocampal volume and enlarged ventricular system: Findings do not support hippocampal degeneration. Possible expansion of ventricular system without medial temporal lobe focused ex-vacuo process.          I have spent 89 minutes with Patient and family today including taking history from family,  patient, review of records, charting, and counseling regarding diagnosis and management of conditions.

## 2024-01-10 NOTE — ASSESSMENT & PLAN NOTE
LDL is exceedingly low and CTA does not show significant atherosclerotic disease.  Recommend decreasing statin therapy to atorvastatin 10 mg daily.

## 2024-01-10 NOTE — PROGRESS NOTES
[facility]  [address], [address]  [place] [address]  [phone]    Social Work Intake    Trina Davis presents today for a geriatric assessment, accompanied by -Bruce.     Social/Family History   Marital status:                                        Spouse's Name:  Bruce     Does patient have children? Yes How Many? 2 children  (If yes, where do the children live?) lives with one son, other lives in [place]  Family members assisting patient at home:  assists   Are any relationships causing problems right now: No   Who is available to provide care in case of illness or crisis (please specify relation to patient / level of care able to provide)? -Mando        Employment and Education   Education: Highest Level of Education: 2 years of college    Currently Employed: No    Retired: Yes                        Date of [employer]? 2004    Type of employment: Respiratory therapist   :  was , does not receive benefits      Lifestyle/Community Services   Clubs and Organizations: Rastafarian, bible study   Major life events in past two years (ex: [employer], death in family, major illness etc.): sister passed away, stroke    Have you ever used a home care agency, meal delivery service, or respite care?: No      Financial   Total Monthly income: $1000     Source of income: Social Security/pension   Meet current needs?  Yes with  assets     Funds available for home care? No    Funds available for nursing home? No    Do you rent or own your home? Son owns the home they live in      End-Of-Life Checklist   Have wishes or desires for end-of-life care been discussed? POA & living will      For all patients, we encourage seeing a  to establish a Financial Power of , a Health Care Power of , and an Advanced Directive (living will). Particularly for patients experiencing memory concerns, we strongly recommend that this  "is completed as soon as possible.       Safety Assessment   Is the patient still driving? No    Is the patient taking medications as prescribed?  Was missing them when she was more confused per      Is there a system in place for medication management?  oversees medications now and places them in cup for her to take them  Are firearms or weapons in the home? No  Recommendations per [employer]: removing them from the home, keep ammunition stored separately, encourage patient to consider \"gifting\" them, if necessary, ask local law enforcement for assistance in removing the firearms from the home. The family may receive compensation from a gun buy-back program.    Does the patient live alone? Lives with son, and   What is the layout of the home? Multistory home, son lives upstairs in [facility], 1 step to enter the home   Do you feel comfortable leaving the person home alone? Yes, when patient was more confused  was not as comfortable   Have you noticed any burned pans or other signs of issues with the stove or other appliances? No   Do you have any concerns about the person’s cooking or eating habits? No concerns  Are there working smoke detectors and fire extinguishers in the home? Yes    Have you thought about when it will no longer be safe for the patient to live alone? Remain in the home or would consider placement if it was needed     [place] Cognitive Assessment (MoCA) Version 8.3  Education: 2yrs of college    Points Earned POSSIBLE Points   Visuospatial/Executive   Alternating [place] Making 0 1   Visuoconstructional skills 0 1   Visuoconstructional skills (clock) 2 3   Naming   Naming Animals 3 3   Attention   Digit Span 1 2   Vigilance (letters) 1 1   Serial 7 subtraction 0 3   Language   Sentence Repetition 2 2   Verbal fluency 0 1   Abstraction   Abstraction (word pairings) 2 2   Delayed recall   Delayed recall 0 5   Memory index score: 4/15   Orientation   Orientation 4 6   TOTAL " SCORE: 15/30  (Normal ?26/30)   Additional notes: moca completed by certified medical assistant: Mary MARISCAL     Geriatric Depression Scale (GDS) completed by medical assistant, score: 9/15.

## 2024-01-10 NOTE — ASSESSMENT & PLAN NOTE
Likely complicated by constipation.  Start senna.  Would recommend decreasing PPI dose as this may complicated CKD IV as well.

## 2024-01-11 ENCOUNTER — TELEPHONE (OUTPATIENT)
Dept: NEUROLOGY | Facility: CLINIC | Age: 74
End: 2024-01-11

## 2024-01-11 ENCOUNTER — PATIENT MESSAGE (OUTPATIENT)
Dept: INTERNAL MEDICINE CLINIC | Facility: CLINIC | Age: 74
End: 2024-01-11

## 2024-01-11 DIAGNOSIS — D51.3 OTHER DIETARY VITAMIN B12 DEFICIENCY ANEMIA: ICD-10-CM

## 2024-01-11 DIAGNOSIS — E53.9 VITAMIN B DEFICIENCY: Primary | ICD-10-CM

## 2024-01-12 ENCOUNTER — APPOINTMENT (OUTPATIENT)
Dept: LAB | Facility: AMBULARY SURGERY CENTER | Age: 74
End: 2024-01-12
Payer: MEDICARE

## 2024-01-12 DIAGNOSIS — N18.4 CKD (CHRONIC KIDNEY DISEASE) STAGE 4, GFR 15-29 ML/MIN (HCC): ICD-10-CM

## 2024-01-12 LAB
ANION GAP SERPL CALCULATED.3IONS-SCNC: 9 MMOL/L
BUN SERPL-MCNC: 45 MG/DL (ref 5–25)
CALCIUM SERPL-MCNC: 8.7 MG/DL (ref 8.4–10.2)
CHLORIDE SERPL-SCNC: 104 MMOL/L (ref 96–108)
CO2 SERPL-SCNC: 23 MMOL/L (ref 21–32)
CREAT SERPL-MCNC: 3.15 MG/DL (ref 0.6–1.3)
GFR SERPL CREATININE-BSD FRML MDRD: 13 ML/MIN/1.73SQ M
GLUCOSE P FAST SERPL-MCNC: 179 MG/DL (ref 65–99)
POTASSIUM SERPL-SCNC: 4.1 MMOL/L (ref 3.5–5.3)
SODIUM SERPL-SCNC: 136 MMOL/L (ref 135–147)

## 2024-01-12 PROCEDURE — 36415 COLL VENOUS BLD VENIPUNCTURE: CPT

## 2024-01-12 PROCEDURE — 80048 BASIC METABOLIC PNL TOTAL CA: CPT

## 2024-01-16 ENCOUNTER — TELEPHONE (OUTPATIENT)
Dept: INTERNAL MEDICINE CLINIC | Facility: CLINIC | Age: 74
End: 2024-01-16

## 2024-01-16 NOTE — TELEPHONE ENCOUNTER
Patient called and she is having some aching feelings in her neck arms and hips  and she feels like her stomach is upset.   She did see the chiropractor yesterday but feels the pains were present even before the visit. I did discuss use of her Cpap. She is going to set up a visit tomorrow if this persists. She has been told if things are excessive she can go to ER she does not want to go to ER. She also does not want to go out right now with the snow. I just wanted to let provider review for any other input. Thank you

## 2024-01-17 DIAGNOSIS — N18.4 CKD (CHRONIC KIDNEY DISEASE) STAGE 4, GFR 15-29 ML/MIN (HCC): ICD-10-CM

## 2024-01-17 DIAGNOSIS — R80.1 PERSISTENT PROTEINURIA: ICD-10-CM

## 2024-01-17 DIAGNOSIS — I12.9 PARENCHYMAL RENAL HYPERTENSION, STAGE 1 THROUGH STAGE 4 OR UNSPECIFIED CHRONIC KIDNEY DISEASE: ICD-10-CM

## 2024-01-17 RX ORDER — SPIRONOLACTONE 25 MG/1
25 TABLET ORAL DAILY
Qty: 90 TABLET | Refills: 2 | Status: SHIPPED | OUTPATIENT
Start: 2024-01-17

## 2024-01-17 NOTE — PROGRESS NOTES
NEPHROLOGY OFFICE VISIT   Trina Davis 73 y.o. female MRN: [id number]  1/18/2024    Reason for Visit: Follow-up and ACP after follow-up visit    INTERVAL HISTORY and SUBJECTIVE:    I had the pleasure of seeing Trina today in the renal clinic.  She is a 73-year-old female who follows with Dr. Baires.  She was last seen in the nephrology clinic September 2023 for hospital follow-up.  August had acute kidney injury on top of progressive disease in the setting of recurrent UTI, neurogenic bladder/volume depletion.  November 2023 went to the emergency room and was hospitalized due to accelerated hypertension and migraine headaches.  She has been feeling better with intermittent headache that resolves on its own but she does not feel that they are migraine quality headaches.  At last visit creatinine increasing with new baseline in the upper twos and current creatinine up to 3.15.  Eating well but losing weight-- lost 10 lbs in apprx 2 months.  She is still concerned about abdominal fat.  She is also concerned about periods of confusion.  Her  notes that she has been much better now that she is hydrating better.    Last labs obtained on 1/12/2024.  Reviewed with Trina    ASSESSMENT AND PLAN:    Chronic kidney disease, stage IV:  Etiology: Diabetic nephropathy, hypertensive nephrosclerosis, arteriolar nephrosclerosis, chronic NSAID use.  Prior baseline as of June 2023 1.5 to 2 mg/dL.  Creatinine was up to 4.86 at hospitalization in August 2023 in the setting of volume depletion/RAAS blockade/diuretic/UTI.  Creatinine improved to the low twos.  More recently creatinine has increased between 2.4 to 2.8 mg/dL.  Last creatinine on 1/12/2024 up to 3.15.  Prior work-up unrevealing  Advance care planning.  Patient has a POA and living will.  Does not want to pursue transplant option or renal replacement therapy in the form of peritoneal dialysis or hemodialysis.  She was recently diagnosed with mild dementia.  Her   notices that she has been less confused with better hydration.  Today she was oriented, alert and cognitive function seem to be within normal limits.  Increased creatinine may be related to low blood pressures.  Decreasing losartan to 25 mg twice a day.  Trina still needs RAAS blockade due to proteinuria.    Nephrotic range proteinuria  Prior workup unrevealing  Etiology likely diabetic nephropathy  Current UPCR 8 g.  Protein excretion has increased and remained significantly elevated/nephrotic range since since September 2023 following hospitalization with [facility]  No M spike on previous immunofixation.  On RAAS blockade    Recurrent UTI:  History of neurogenic bladder.  Does intermittent straight caths at home  Recently started on cefdinir for urine culture positive for Serratia  On chronic suppression.  Doing better according to her      Hypertension/volume:  Home medications: Losartan 50 mg twice a day, metoprolol tartrate 50 mg twice a day, amlodipine 5 mg daily,  Spironolactone 25 mg daily.  Torsemide 5 mg daily as needed.  Has not taken any torsemide in approximately 3 months.  Edema is well-controlled on spironolactone  (Recently seen by geriatric medicine: Commendations for adjustment medications to decrease losartan therapy considering current CKD stage, moving away from hydralazine and starting daily torsemide.  Recommend tapering PPI dosing.  Continue spironolactone)  Blood pressure quite acceptable today on physical exam.  She also had significant orthostatic drop in pressure.  30 mm declined upon standing.  She has been symptomatic with lightheadedness.  Reduction in blood pressure may be somewhat related to weight loss.  Lost 10 pounds in the last few months.  Geriatric visit on 1/10 which was approximately week ago blood pressure was 116/68.  Home blood pressure readings averaged.  Near 130/70.  Pressure was not measured in a standing position at home.  Plan:  Reduce losartan 25 mg twice a  day  Instructed to call if blood pressure increases and remains consistently greater than 140/80     Mild hyponatremia:   Sodium level improved and is now within normal limits on a consistent basis.  Sodium level 136.    CKD-MBD:  Vitamin D panel in December 2023: D3 10, D2 22.  On ergocalciferol.  Instructed to resume vitamin D3 when course of D2 has been completed.  PTH acceptable for current level of CKD, 88.5     Iron deficiency anemia:  Last hemoglobin 11.2 in June 2023  Iron studies December 22, 2023: Iron saturation 21%, ferritin 13  Hemoglobin 13.2.  On iron supplement every other day. Takes colace./senna/miralax     Hypothyroidism:  Managed by PCP  On levothyroxine     Distant history of nephrolithiasis:   NO problems recently     Recurrent falls weakness.  Prior falls.  None recently.  Concern for lightheadedness and dizziness.  Orthostatic drop in blood pressure today.  Continue to monitor.  Meds adjusted.     Mixed hyperlipidemia  On statin     GERD: On PPI twice daily she seems to require for control of symptoms..  Geriatric medicine recommending reducing PPI dose due to his significant CKD.  PPIs could cause interstitial nephritis.    Restless legs on pramipexole    SHAYAN: Patient does not tolerate therapy well    Diabetes mellitus type 2:  Recently seen by geriatric medicine.  On insulin.    Mild dementia: Recently seen by geriatric medicine 1/10/2024.  Patient does not have capacity for making complex medical or financial decisions.    CVA: Hypertension induced    B12 deficiency: On oral therapy    PATIENT INSTRUCTIONS:    Patient Instructions   Thank you for coming to the office today Trina.  You were seen today for continued monitoring of kidney function.  You have chronic kidney disease, stage IV.  We are watching your kidney function very closely.  Continue monitoring blood pressure at home that is very helpful.  Avoid all NSAIDs such as Motrin Aleve ibuprofen and Advil.  You can use Tylenol for  "control of mild pain    Recommendations:  Decrease losartan to 25 mg twice a day.  Follow-up in approximately 3 months  Blood work and urine studies prior to the next appointment  Avoid NSAIDs  Continue home blood pressure monitoring and call if blood pressure remains consistently greater than 140/80  Complete course of ergocalciferol which is vitamin D2 then resume vitamin D3.        Orders Placed This Encounter   Procedures    Basic metabolic panel     Standing Status:   Future     Standing Expiration Date:   1/18/2025    CBC     Standing Status:   Future     Standing Expiration Date:   8/1/2024    Magnesium     Standing Status:   Future     Standing Expiration Date:   8/1/2024    PTH, intact     Standing Status:   Future     Standing Expiration Date:   8/1/2024    VITAMIN D, 25 HYDROXY, TOTAL     Standing Status:   Future     Standing Expiration Date:   8/1/2024    Phosphorus     Standing Status:   Future     Standing Expiration Date:   8/1/2024    Comprehensive metabolic panel     This is a patient instruction: Patient fasting for 8 hours or longer recommended.     Standing Status:   Future     Standing Expiration Date:   8/1/2024    Protein / creatinine ratio, urine     Standing Status:   Future     Standing Expiration Date:   8/1/2024       OBJECTIVE:  Current Weight: Weight - Scale: 76.2 kg (168 lb)  Vitals:    01/18/24 0749 01/18/24 0813 01/18/24 0814   BP:  122/60 90/60   BP Location:  Left arm Left arm   Patient Position:  Sitting Standing   Pulse:  66    Weight: 76.2 kg (168 lb)     Height: 5' 2\" (1.575 m)      Body mass index is 30.73 kg/m².      REVIEW OF SYSTEMS:    Review of Systems   Constitutional:  Positive for fatigue and unexpected weight change. Negative for appetite change, chills and fever.   HENT:  Negative for congestion and sore throat.    Eyes:  Negative for visual disturbance.   Respiratory:  Negative for cough and shortness of breath.    Cardiovascular:  Negative for chest pain, " palpitations and leg swelling.   Gastrointestinal:  Positive for constipation. Negative for abdominal pain, diarrhea, nausea and vomiting.   Genitourinary:  Positive for dysuria. Negative for hematuria.   Musculoskeletal:  Positive for gait problem. Negative for arthralgias and back pain.   Skin:  Negative for color change and rash.   Neurological:  Positive for dizziness, light-headedness and headaches. Negative for seizures and syncope.   Hematological:  Does not bruise/bleed easily.   Psychiatric/Behavioral:  The patient is nervous/anxious.    All other systems reviewed and are negative.      PHYSICAL EXAM:      Physical Exam  Constitutional:       General: She is not in acute distress.     Appearance: She is well-developed. She is not ill-appearing, toxic-appearing or diaphoretic.   HENT:      Head: Normocephalic and atraumatic.      Nose: No congestion.      Mouth/Throat:      Mouth: Mucous membranes are moist.   Eyes:      Extraocular Movements: Extraocular movements intact.      Conjunctiva/sclera: Conjunctivae normal.   Neck:      Thyroid: No thyromegaly.      Vascular: No carotid bruit or JVD.      Trachea: No tracheal deviation.   Cardiovascular:      Rate and Rhythm: Normal rate and regular rhythm.      Heart sounds: No murmur heard.     No friction rub. No gallop.   Pulmonary:      Effort: Pulmonary effort is normal.      Breath sounds: Normal breath sounds.   Abdominal:      General: Bowel sounds are normal. There is no distension.      Palpations: Abdomen is soft. There is no mass.      Tenderness: There is no abdominal tenderness. There is no guarding or rebound.   Musculoskeletal:         General: No tenderness or signs of injury. Normal range of motion.      Cervical back: Normal range of motion and neck supple. No rigidity or tenderness.      Right lower leg: No edema.      Left lower leg: No edema.   Lymphadenopathy:      Cervical: No cervical adenopathy.   Skin:     General: Skin is warm and dry.       Coloration: Skin is not jaundiced or pale.      Findings: No bruising, erythema or rash.   Neurological:      Mental Status: She is alert and oriented to person, place, and time.   Psychiatric:         Mood and Affect: Mood normal.         Behavior: Behavior normal.         Thought Content: Thought content normal.         Judgment: Judgment normal.         Medications:    Current Outpatient Medications:     losartan (COZAAR) 25 mg tablet, Take 1 tablet (25 mg total) by mouth 2 (two) times a day, Disp: 60 tablet, Rfl: 3    albuterol (Ventolin HFA) 90 mcg/act inhaler, Inhale 2 puffs every 6 (six) hours as needed for wheezing, Disp: 54 g, Rfl: 0    amLODIPine (NORVASC) 5 mg tablet, Take 1 tablet (5 mg total) by mouth daily, Disp: 90 tablet, Rfl: 1    aspirin (ECOTRIN LOW STRENGTH) 81 mg EC tablet, Take 1 tablet (81 mg total) by mouth daily Do not start before October 31, 2023., Disp: , Rfl: 0    B-D ULTRAFINE III SHORT PEN 31G X 8 MM MISC, , Disp: , Rfl: 3    Calcium Citrate 1040 MG TABS, Take 500 mg by mouth Three times a day, Disp: , Rfl:     cholecalciferol (VITAMIN D3) 1,000 units tablet, Take 2 tablets (2,000 Units total) by mouth daily (Patient taking differently: Take 2,000 Units by mouth daily On hold for eight weeks then start again), Disp: 180 tablet, Rfl: 1    Coenzyme Q10 (CoQ10) 100 MG CAPS, Take 100 mg by mouth in the morning Bed time, Disp: , Rfl:     ergocalciferol (VITAMIN D2) 50,000 units, Take 1 capsule (50,000 Units total) by mouth once a week for 8 doses, Disp: 8 capsule, Rfl: 0    famotidine (PEPCID) 10 mg tablet, Take 1 tablet (10 mg total) by mouth daily Do not start before September 13, 2023., Disp: 30 tablet, Rfl: 0    ferrous sulfate 324 MG TBEC, TAKE 1 TABLET (324 MG TOTAL) BY MOUTH EVERY OTHER DAY, Disp: 45 tablet, Rfl: 0    fluticasone (FLONASE) 50 mcg/act nasal spray, 1 spray into each nostril daily for 14 days, Disp: 11.1 mL, Rfl: 1    gabapentin (NEURONTIN) 100 mg capsule, Take 200  mg by mouth daily at bedtime, Disp: , Rfl:     hydrALAZINE (APRESOLINE) 25 mg tablet, TAKE 1 TABLET BY MOUTH TWICE A DAY, Disp: 180 tablet, Rfl: 2    Icosapent Ethyl 1 g CAPS, Take 2 capsules (2 g total) by mouth 2 (two) times a day, Disp: 120 capsule, Rfl: 3    insulin aspart (NovoLOG) 100 units/mL injection, Inject under the skin 3 (three) times a day before meals 18 units, 18 units, 22 units., Disp: , Rfl:     insulin detemir (Levemir FlexTouch) 100 Units/mL injection pen, Inject 50 Units under the skin every evening , Disp: , Rfl:     Lactobacillus (PROBIOTIC ACIDOPHILUS PO), Take 1 capsule by mouth 2 (two) times a day, Disp: , Rfl:     levothyroxine 137 mcg tablet, Take 137 mcg by mouth daily, Disp: , Rfl:     methenamine hippurate (HIPREX) 1 g tablet, Take 1 tablet (1 g total) by mouth 2 (two) times a day with meals, Disp: 180 tablet, Rfl: 3    metoprolol tartrate (LOPRESSOR) 50 mg tablet, Take 1 tablet (50 mg total) by mouth every 12 (twelve) hours, Disp: 180 tablet, Rfl: 3    pantoprazole (PROTONIX) 40 mg tablet, Take 1 tablet (40 mg total) by mouth 2 (two) times a day, Disp: 180 tablet, Rfl: 1    pramipexole (MIRAPEX) 0.25 mg tablet, Take 1 tablet (0.25 mg total) by mouth daily at bedtime, Disp: 90 tablet, Rfl: 0    Restasis 0.05 % ophthalmic emulsion, Administer 1 drop to both eyes every 12 (twelve) hours, Disp: , Rfl:     rosuvastatin (CRESTOR) 10 MG tablet, Take 10 mg by mouth daily, Disp: , Rfl:     senna (SENOKOT) 8.6 mg, Take 1 tablet (8.6 mg total) by mouth in the morning, Disp: 90 tablet, Rfl: 3    spironolactone (ALDACTONE) 25 mg tablet, TAKE 1 TABLET (25 MG TOTAL) BY MOUTH DAILY., Disp: 90 tablet, Rfl: 2    torsemide (DEMADEX) 10 mg tablet, Take 0.5 tablets (5 mg total) by mouth daily as needed (weight gain), Disp: , Rfl:     vitamin B-12 (VITAMIN B-12) 1,000 mcg tablet, Take 1 tablet (1,000 mcg total) by mouth daily, Disp: 90 tablet, Rfl: 3    Laboratory Results:  Results from last 7 days   Lab  Units 01/12/24  0941   POTASSIUM mmol/L 4.1   CHLORIDE mmol/L 104   CO2 mmol/L 23   BUN mg/dL 45*   CREATININE mg/dL 3.15*   CALCIUM mg/dL 8.7       Results for orders placed or performed in visit on 01/12/24   Basic metabolic panel   Result Value Ref Range    Sodium 136 135 - 147 mmol/L    Potassium 4.1 3.5 - 5.3 mmol/L    Chloride 104 96 - 108 mmol/L    CO2 23 21 - 32 mmol/L    ANION GAP 9 mmol/L    BUN 45 (H) 5 - 25 mg/dL    Creatinine 3.15 (H) 0.60 - 1.30 mg/dL    Glucose, Fasting 179 (H) 65 - 99 mg/dL    Calcium 8.7 8.4 - 10.2 mg/dL    eGFR 13 ml/min/1.73sq m     *Note: Due to a large number of results and/or encounters for the requested time period, some results have not been displayed. A complete set of results can be found in Results Review.

## 2024-01-17 NOTE — TELEPHONE ENCOUNTER
I called to follow up and patient was using heat and she went for a walk with her walker and she feels like things have improved some. She will call if she needs follow up with us.

## 2024-01-18 ENCOUNTER — PATIENT MESSAGE (OUTPATIENT)
Dept: NEPHROLOGY | Facility: CLINIC | Age: 74
End: 2024-01-18

## 2024-01-18 ENCOUNTER — OFFICE VISIT (OUTPATIENT)
Dept: NEPHROLOGY | Facility: CLINIC | Age: 74
End: 2024-01-18

## 2024-01-18 ENCOUNTER — OFFICE VISIT (OUTPATIENT)
Dept: NEPHROLOGY | Facility: CLINIC | Age: 74
End: 2024-01-18
Payer: MEDICARE

## 2024-01-18 VITALS
DIASTOLIC BLOOD PRESSURE: 60 MMHG | HEIGHT: 62 IN | WEIGHT: 168 LBS | BODY MASS INDEX: 30.91 KG/M2 | HEART RATE: 66 BPM | SYSTOLIC BLOOD PRESSURE: 90 MMHG

## 2024-01-18 DIAGNOSIS — Z87.440 HISTORY OF RECURRENT UTIS: Chronic | ICD-10-CM

## 2024-01-18 DIAGNOSIS — Z79.4 TYPE 2 DIABETES MELLITUS WITH STAGE 4 CHRONIC KIDNEY DISEASE, WITH LONG-TERM CURRENT USE OF INSULIN (HCC): Chronic | ICD-10-CM

## 2024-01-18 DIAGNOSIS — K21.9 GASTROESOPHAGEAL REFLUX DISEASE WITHOUT ESOPHAGITIS: Chronic | ICD-10-CM

## 2024-01-18 DIAGNOSIS — N18.5 CHRONIC RENAL DISEASE, STAGE V (HCC): Primary | ICD-10-CM

## 2024-01-18 DIAGNOSIS — E55.9 VITAMIN D DEFICIENCY: Primary | Chronic | ICD-10-CM

## 2024-01-18 DIAGNOSIS — N18.4 ANEMIA DUE TO STAGE 4 CHRONIC KIDNEY DISEASE: ICD-10-CM

## 2024-01-18 DIAGNOSIS — E21.3 HYPERPARATHYROIDISM (HCC): ICD-10-CM

## 2024-01-18 DIAGNOSIS — E55.9 VITAMIN D DEFICIENCY: Chronic | ICD-10-CM

## 2024-01-18 DIAGNOSIS — N18.4 CKD (CHRONIC KIDNEY DISEASE) STAGE 4, GFR 15-29 ML/MIN (HCC): ICD-10-CM

## 2024-01-18 DIAGNOSIS — N18.4 STAGE 4 CHRONIC KIDNEY DISEASE (HCC): ICD-10-CM

## 2024-01-18 DIAGNOSIS — I10 ESSENTIAL HYPERTENSION: ICD-10-CM

## 2024-01-18 DIAGNOSIS — D63.1 ANEMIA IN STAGE 4 CHRONIC KIDNEY DISEASE: ICD-10-CM

## 2024-01-18 DIAGNOSIS — R80.1 PERSISTENT PROTEINURIA: ICD-10-CM

## 2024-01-18 DIAGNOSIS — N18.4 TYPE 2 DIABETES MELLITUS WITH STAGE 4 CHRONIC KIDNEY DISEASE, WITH LONG-TERM CURRENT USE OF INSULIN (HCC): Chronic | ICD-10-CM

## 2024-01-18 DIAGNOSIS — N18.4 ANEMIA IN STAGE 4 CHRONIC KIDNEY DISEASE: ICD-10-CM

## 2024-01-18 DIAGNOSIS — E11.22 TYPE 2 DIABETES MELLITUS WITH STAGE 4 CHRONIC KIDNEY DISEASE, WITH LONG-TERM CURRENT USE OF INSULIN (HCC): Chronic | ICD-10-CM

## 2024-01-18 DIAGNOSIS — Z86.73 HISTORY OF CEREBROVASCULAR ACCIDENT (CVA) DUE TO ISCHEMIA: Chronic | ICD-10-CM

## 2024-01-18 DIAGNOSIS — N25.81 SECONDARY HYPERPARATHYROIDISM OF RENAL ORIGIN (HCC): Chronic | ICD-10-CM

## 2024-01-18 DIAGNOSIS — E78.2 MIXED HYPERLIPIDEMIA: ICD-10-CM

## 2024-01-18 DIAGNOSIS — D63.1 ANEMIA DUE TO STAGE 4 CHRONIC KIDNEY DISEASE: ICD-10-CM

## 2024-01-18 DIAGNOSIS — J44.9 CHRONIC OBSTRUCTIVE PULMONARY DISEASE, UNSPECIFIED COPD TYPE (HCC): Chronic | ICD-10-CM

## 2024-01-18 DIAGNOSIS — I12.9 PARENCHYMAL RENAL HYPERTENSION, STAGE 1 THROUGH STAGE 4 OR UNSPECIFIED CHRONIC KIDNEY DISEASE: ICD-10-CM

## 2024-01-18 DIAGNOSIS — F03.A0 MILD DEMENTIA WITHOUT BEHAVIORAL DISTURBANCE, PSYCHOTIC DISTURBANCE, MOOD DISTURBANCE, OR ANXIETY, UNSPECIFIED DEMENTIA TYPE (HCC): Chronic | ICD-10-CM

## 2024-01-18 PROCEDURE — PBNCHG PB NO CHARGE PLACEHOLDER: Performed by: NURSE PRACTITIONER

## 2024-01-18 PROCEDURE — 99214 OFFICE O/P EST MOD 30 MIN: CPT | Performed by: NURSE PRACTITIONER

## 2024-01-18 RX ORDER — LOSARTAN POTASSIUM 25 MG/1
25 TABLET ORAL 2 TIMES DAILY
Qty: 60 TABLET | Refills: 3 | Status: SHIPPED | OUTPATIENT
Start: 2024-01-18

## 2024-01-18 NOTE — PATIENT INSTRUCTIONS
Thank you for coming to the office today Trina.  You were seen today for continued monitoring of kidney function.  You have chronic kidney disease, stage IV.  We are watching your kidney function very closely.  Continue monitoring blood pressure at home that is very helpful.  Avoid all NSAIDs such as Motrin Aleve ibuprofen and Advil.  You can use Tylenol for control of mild pain    Recommendations:  Decrease losartan to 25 mg twice a day.  Follow-up in approximately 3 months  Blood work and urine studies prior to the next appointment  Avoid NSAIDs  Continue home blood pressure monitoring and call if blood pressure remains consistently greater than 140/80  Complete course of ergocalciferol which is vitamin D2 then resume vitamin D3.

## 2024-01-18 NOTE — ACP (ADVANCE CARE PLANNING)
Advanced Care Planning Progress Note    Serious Illness Conversation    1. What is your understanding now of where you are with your illness?  Prognostic Understanding: appropriate understanding of prognosis     2. How much information about what is likely to be ahead with your illness would you like to have?  Information: patient wants to be fully informed     3. What did you (clinician) communicate to the patient?  Prognostic Communication: Uncertain - It can be difficult to predict what will happen with your illness. I hope you will continue to live well for a long time but I’m worried that you could get sick quickly, and I think it is important to prepare for that possibility.  That he understands that there may be a continued decline in renal function and that there will likely be a decline.  I did explain to her that it is impossible to predict how quickly kidney function would change.  She seemed to be thinking very clearly today and so really about the decision for no dialysis.  Her  was in attendance and very supportive of her wishes.  Trina already has a advanced directive in place that was prepared prior.     4. If your health situation worsens, what are your most important goals?  Goals: be at home, be mentally aware, have my medical decisions respected, not be a burden     5. What are the biggest fears and worries about the future and your health?  Fears/Worries: being dependent, being an emotional burden, burdening others     6. What abilities are so critical to your life that you cannot imagine living without them?  Unacceptable Function: being chronically confused or not being myself, being unable to communicate effectively, not being able to care for myself, including toileting and feeding     7. What gives you strength as you think about the future with your illness?  The support of my  is a great comfort to me although I hate having to rely on him so much but when I get confused I  need him near me because that is the only thing that helps.     8. If you become sicker, how much are you willing to go through for the possibility of gaining more time?  Be in the hospital: Yes Have a feeding tube: No   Be in the ICU: Yes Live in a nursing home: No   Be on a ventilator: No Be uncomfortable: No   Be on dialysis: No Undergo aggressive test and/or procedures: No   9. How much does your proxy and family know about your priorities and wishes?  Discussion Discussion: extensive discussion with family about goals and wishes     I’ve heard you say that family support and [employer] support is really important to you. Keeping that in mind, and what we know about your illness, I recommend that we continue to monitor overall health and kidney function.  We will try to keep kidneys as healthy as possible for as long as possible.. This will help us make sure that your treatment plans reflect what’s important to you.     How does this plan sound to you? I will do everything I can to help you through this.  Patient verbalized understanding of the plan     I have spent 30minutes speaking with my patient on advanced care planning today or during this visit     Advanced directives  Five Wishes: Patient does not have Five Wishes- would not like information         RAMIRO Deutsch   NEPHROLOGY ADVANCED GOALS OF CARE MEETING  Trina Davis 73 y.o. female MRN: [id number]  1/18/2024        ASSESSMENT and PLAN:    I had the pleasure of seeing Trina Davis today in the renal clinic for discussion of the patient's goals of care. Our discussion today will focus on helping Trina and her  achieve their desired goals, long term goals of care and how best to achieve those goals. The participants during this visit include Trina and her  Bruce.        Kidney Smart Class: Completed Kidney Smart Class  Would Pursue Dialysis if Needed: No  Dialysis Access: No Access   Advanced Directive: Completed  Code Status:   likely DNR  POA:   Do they need to be referred to Palliative Care: No  Do they need to be referred to Hospice: No  Length/Time of Meetin Minutes  Outpatient Nephrologist: Donaldo Baires MD    Discussion and Plan:    Trina was very cognizant of her decision today while we spoke about renal replacement therapy and other care options.  Her  was in attendance and states that her confusion is much better when she is hydrated therefore she has been doing better with drinking fluids and is mentating much better.        REVIEW OF SYSTEMS:    Please see follow-up visit which was done prior to the ACP meeting for review of systems.      Medications:    Current Outpatient Medications:     albuterol (Ventolin HFA) 90 mcg/act inhaler, Inhale 2 puffs every 6 (six) hours as needed for wheezing, Disp: 54 g, Rfl: 0    amLODIPine (NORVASC) 5 mg tablet, Take 1 tablet (5 mg total) by mouth daily, Disp: 90 tablet, Rfl: 1    aspirin (ECOTRIN LOW STRENGTH) 81 mg EC tablet, Take 1 tablet (81 mg total) by mouth daily Do not start before 2023., Disp: , Rfl: 0    B-D ULTRAFINE III SHORT PEN 31G X 8 MM MISC, , Disp: , Rfl: 3    Calcium Citrate 1040 MG TABS, Take 500 mg by mouth Three times a day, Disp: , Rfl:     cholecalciferol (VITAMIN D3) 1,000 units tablet, Take 2 tablets (2,000 Units total) by mouth daily (Patient taking differently: Take 2,000 Units by mouth daily On hold for eight weeks then start again), Disp: 180 tablet, Rfl: 1    Coenzyme Q10 (CoQ10) 100 MG CAPS, Take 100 mg by mouth in the morning Bed time, Disp: , Rfl:     ergocalciferol (VITAMIN D2) 50,000 units, Take 1 capsule (50,000 Units total) by mouth once a week for 8 doses, Disp: 8 capsule, Rfl: 0    famotidine (PEPCID) 10 mg tablet, Take 1 tablet (10 mg total) by mouth daily Do not start before 2023., Disp: 30 tablet, Rfl: 0    ferrous sulfate 324 MG TBEC, TAKE 1 TABLET (324 MG TOTAL) BY MOUTH EVERY OTHER DAY, Disp: 45 tablet,  Rfl: 0    fluticasone (FLONASE) 50 mcg/act nasal spray, 1 spray into each nostril daily for 14 days, Disp: 11.1 mL, Rfl: 1    gabapentin (NEURONTIN) 100 mg capsule, Take 200 mg by mouth daily at bedtime, Disp: , Rfl:     hydrALAZINE (APRESOLINE) 25 mg tablet, TAKE 1 TABLET BY MOUTH TWICE A DAY, Disp: 180 tablet, Rfl: 2    Icosapent Ethyl 1 g CAPS, Take 2 capsules (2 g total) by mouth 2 (two) times a day, Disp: 120 capsule, Rfl: 3    insulin aspart (NovoLOG) 100 units/mL injection, Inject under the skin 3 (three) times a day before meals 18 units, 18 units, 22 units., Disp: , Rfl:     insulin detemir (Levemir FlexTouch) 100 Units/mL injection pen, Inject 50 Units under the skin every evening , Disp: , Rfl:     Lactobacillus (PROBIOTIC ACIDOPHILUS PO), Take 1 capsule by mouth 2 (two) times a day, Disp: , Rfl:     levothyroxine 137 mcg tablet, Take 137 mcg by mouth daily, Disp: , Rfl:     losartan (COZAAR) 25 mg tablet, Take 1 tablet (25 mg total) by mouth 2 (two) times a day, Disp: 60 tablet, Rfl: 3    methenamine hippurate (HIPREX) 1 g tablet, Take 1 tablet (1 g total) by mouth 2 (two) times a day with meals, Disp: 180 tablet, Rfl: 3    metoprolol tartrate (LOPRESSOR) 50 mg tablet, Take 1 tablet (50 mg total) by mouth every 12 (twelve) hours, Disp: 180 tablet, Rfl: 3    pantoprazole (PROTONIX) 40 mg tablet, Take 1 tablet (40 mg total) by mouth 2 (two) times a day, Disp: 180 tablet, Rfl: 1    pramipexole (MIRAPEX) 0.25 mg tablet, Take 1 tablet (0.25 mg total) by mouth daily at bedtime, Disp: 90 tablet, Rfl: 0    Restasis 0.05 % ophthalmic emulsion, Administer 1 drop to both eyes every 12 (twelve) hours, Disp: , Rfl:     rosuvastatin (CRESTOR) 10 MG tablet, Take 10 mg by mouth daily, Disp: , Rfl:     senna (SENOKOT) 8.6 mg, Take 1 tablet (8.6 mg total) by mouth in the morning, Disp: 90 tablet, Rfl: 3    spironolactone (ALDACTONE) 25 mg tablet, TAKE 1 TABLET (25 MG TOTAL) BY MOUTH DAILY., Disp: 90 tablet, Rfl: 2     torsemide (DEMADEX) 10 mg tablet, Take 0.5 tablets (5 mg total) by mouth daily as needed (weight gain), Disp: , Rfl:     vitamin B-12 (VITAMIN B-12) 1,000 mcg tablet, Take 1 tablet (1,000 mcg total) by mouth daily, Disp: 90 tablet, Rfl: 3

## 2024-01-25 ENCOUNTER — APPOINTMENT (OUTPATIENT)
Dept: LAB | Facility: AMBULARY SURGERY CENTER | Age: 74
End: 2024-01-25
Payer: MEDICARE

## 2024-01-25 ENCOUNTER — TELEPHONE (OUTPATIENT)
Dept: CARDIOLOGY CLINIC | Facility: CLINIC | Age: 74
End: 2024-01-25

## 2024-01-25 DIAGNOSIS — D51.3 OTHER DIETARY VITAMIN B12 DEFICIENCY ANEMIA: ICD-10-CM

## 2024-01-25 DIAGNOSIS — I10 ESSENTIAL HYPERTENSION: ICD-10-CM

## 2024-01-25 DIAGNOSIS — N18.5 CHRONIC RENAL DISEASE, STAGE V (HCC): ICD-10-CM

## 2024-01-25 DIAGNOSIS — E53.9 VITAMIN B DEFICIENCY: ICD-10-CM

## 2024-01-25 DIAGNOSIS — E03.9 HYPOTHYROIDISM, UNSPECIFIED TYPE: ICD-10-CM

## 2024-01-25 DIAGNOSIS — R80.1 PERSISTENT PROTEINURIA: ICD-10-CM

## 2024-01-25 DIAGNOSIS — E78.2 MIXED HYPERLIPIDEMIA: ICD-10-CM

## 2024-01-25 DIAGNOSIS — E11.22 TYPE 2 DIABETES MELLITUS WITH STAGE 4 CHRONIC KIDNEY DISEASE, WITH LONG-TERM CURRENT USE OF INSULIN (HCC): Primary | Chronic | ICD-10-CM

## 2024-01-25 DIAGNOSIS — I12.9 PARENCHYMAL RENAL HYPERTENSION, STAGE 1 THROUGH STAGE 4 OR UNSPECIFIED CHRONIC KIDNEY DISEASE: ICD-10-CM

## 2024-01-25 DIAGNOSIS — N18.4 STAGE 4 CHRONIC KIDNEY DISEASE (HCC): ICD-10-CM

## 2024-01-25 DIAGNOSIS — N18.4 TYPE 2 DIABETES MELLITUS WITH STAGE 4 CHRONIC KIDNEY DISEASE, WITH LONG-TERM CURRENT USE OF INSULIN (HCC): Primary | Chronic | ICD-10-CM

## 2024-01-25 DIAGNOSIS — E55.9 VITAMIN D DEFICIENCY: ICD-10-CM

## 2024-01-25 DIAGNOSIS — E21.3 HYPERPARATHYROIDISM (HCC): ICD-10-CM

## 2024-01-25 DIAGNOSIS — Z79.4 TYPE 2 DIABETES MELLITUS WITH STAGE 4 CHRONIC KIDNEY DISEASE, WITH LONG-TERM CURRENT USE OF INSULIN (HCC): Primary | Chronic | ICD-10-CM

## 2024-01-25 LAB
25(OH)D3 SERPL-MCNC: 21.4 NG/ML (ref 30–100)
ANION GAP SERPL CALCULATED.3IONS-SCNC: 6 MMOL/L
BUN SERPL-MCNC: 60 MG/DL (ref 5–25)
CALCIUM SERPL-MCNC: 9.5 MG/DL (ref 8.4–10.2)
CHLORIDE SERPL-SCNC: 105 MMOL/L (ref 96–108)
CHOLEST SERPL-MCNC: 113 MG/DL
CO2 SERPL-SCNC: 24 MMOL/L (ref 21–32)
CREAT SERPL-MCNC: 3.06 MG/DL (ref 0.6–1.3)
GFR SERPL CREATININE-BSD FRML MDRD: 14 ML/MIN/1.73SQ M
GLUCOSE P FAST SERPL-MCNC: 151 MG/DL (ref 65–99)
HDLC SERPL-MCNC: 34 MG/DL
LDLC SERPL CALC-MCNC: 13 MG/DL (ref 0–100)
POTASSIUM SERPL-SCNC: 4.4 MMOL/L (ref 3.5–5.3)
SODIUM SERPL-SCNC: 135 MMOL/L (ref 135–147)
TRIGL SERPL-MCNC: 331 MG/DL
TSH SERPL DL<=0.05 MIU/L-ACNC: 6.64 UIU/ML (ref 0.45–4.5)
VIT B12 SERPL-MCNC: 2387 PG/ML (ref 180–914)

## 2024-01-25 PROCEDURE — 82607 VITAMIN B-12: CPT

## 2024-01-25 PROCEDURE — 36415 COLL VENOUS BLD VENIPUNCTURE: CPT

## 2024-01-25 PROCEDURE — 80048 BASIC METABOLIC PNL TOTAL CA: CPT

## 2024-01-25 PROCEDURE — 82306 VITAMIN D 25 HYDROXY: CPT

## 2024-01-25 PROCEDURE — 80061 LIPID PANEL: CPT

## 2024-01-25 PROCEDURE — 84443 ASSAY THYROID STIM HORMONE: CPT

## 2024-01-25 NOTE — TELEPHONE ENCOUNTER
----- Message from RAMIRO Mclaughlin sent at 1/25/2024  1:32 PM EST -----  Her LDL is low.  Lets check a direct LDL, which is nonfasting to clarify her status, before the next visit

## 2024-01-26 ENCOUNTER — OFFICE VISIT (OUTPATIENT)
Dept: INTERNAL MEDICINE CLINIC | Facility: CLINIC | Age: 74
End: 2024-01-26
Payer: MEDICARE

## 2024-01-26 VITALS
RESPIRATION RATE: 12 BRPM | SYSTOLIC BLOOD PRESSURE: 120 MMHG | OXYGEN SATURATION: 99 % | HEART RATE: 60 BPM | DIASTOLIC BLOOD PRESSURE: 70 MMHG | BODY MASS INDEX: 29.26 KG/M2 | TEMPERATURE: 98.3 F | WEIGHT: 160 LBS

## 2024-01-26 DIAGNOSIS — K21.9 GASTROESOPHAGEAL REFLUX DISEASE WITHOUT ESOPHAGITIS: Chronic | ICD-10-CM

## 2024-01-26 DIAGNOSIS — R10.13 EPIGASTRIC PAIN: Primary | ICD-10-CM

## 2024-01-26 PROCEDURE — 99213 OFFICE O/P EST LOW 20 MIN: CPT

## 2024-01-26 RX ORDER — SUCRALFATE 1 G/1
1 TABLET ORAL 4 TIMES DAILY
Qty: 20 TABLET | Refills: 0 | Status: SHIPPED | OUTPATIENT
Start: 2024-01-26 | End: 2024-01-31

## 2024-01-26 NOTE — ASSESSMENT & PLAN NOTE
Patient maintained on pantoprazole 40 mg twice daily and famotidine 10 mg daily  Presenting for acute epigastric abdominal pain worsened following meals.   EGD in 2022 showed moderate gastritis, small hiatal hernia, and esophageal diverticulum  Etiology of patient's presenting complaints likely secondary to gastritis    Plan:  Continue current regimen of pantoprazole and Pepcid  Will start patient on Carafate 1 g four times daily  Ambulatory referral to gastroenterology

## 2024-01-26 NOTE — ASSESSMENT & PLAN NOTE
Patient endorses 1 week of worsening epigastric abdominal pain after eating meals. She has been eating more frequent smaller portions meals as opposed still larger meals, which have provided relief.   She denies any hematemesis, nausea, vomiting, melena, or hematochezia. She additionally denies using any over-the-counter anti-inflammatories. She has noticed some degree of weight loss likely secondary to compromised appetite from epigastric discomfort.  Patient continues to take pantoprazole and Pepcid  Suspect gastritis with possibility of peptic ulcer disease    Plan:  Ambulatory referral to gastroenterology. Patient follows with Dr. Mosqueda as an outpatient but has no upcoming scheduled appointments.  Patient would benefit from outpatient EGD to assess for PUD and progression of gastritis.  Will add Carafate to patient's regimen of pantoprazole and Pepcid  Continue to monitor patient's symptoms; if symptoms persist can consider cardiac workup in the form of ECG to rule out non-GI etiologies

## 2024-01-26 NOTE — PROGRESS NOTES
INTERNAL MEDICINE FOLLOW-UP OFFICE VISIT  [facility] - [facility] [place]    NAME: Trina Davis  AGE: 73 y.o. SEX: female  : 1950     DATE: 2024     Assessment and Plan:     1. Epigastric pain  Assessment & Plan:  Patient endorses 1 week of worsening epigastric abdominal pain after eating meals. She has been eating more frequent smaller portions meals as opposed still larger meals, which have provided relief.   She denies any hematemesis, nausea, vomiting, melena, or hematochezia. She additionally denies using any over-the-counter anti-inflammatories. She has noticed some degree of weight loss likely secondary to compromised appetite from epigastric discomfort.  Patient continues to take pantoprazole and Pepcid  Suspect gastritis with possibility of peptic ulcer disease    Plan:  Ambulatory referral to gastroenterology. Patient follows with Dr. Mosqueda as an outpatient but has no upcoming scheduled appointments.  Patient would benefit from outpatient EGD to assess for PUD and progression of gastritis.  Will add Carafate to patient's regimen of pantoprazole and Pepcid  Continue to monitor patient's symptoms; if symptoms persist can consider cardiac workup in the form of ECG to rule out non-GI etiologies    Orders:  -     Ambulatory Referral to Gastroenterology; Future    2. Gastroesophageal reflux disease without esophagitis  Assessment & Plan:  Patient maintained on pantoprazole 40 mg twice daily and famotidine 10 mg daily  Presenting for acute epigastric abdominal pain worsened following meals.   EGD in  showed moderate gastritis, small hiatal hernia, and esophageal diverticulum  Etiology of patient's presenting complaints likely secondary to gastritis    Plan:  Continue current regimen of pantoprazole and Pepcid  Will start patient on Carafate 1 g four times daily  Ambulatory referral to gastroenterology    Orders:  -     sucralfate (CARAFATE) 1 g tablet; Take 1 tablet  (1 g total) by mouth 4 (four) times a day for 5 days  -     Ambulatory Referral to Gastroenterology; Future        No follow-ups on file.     Chief Complaint:     Chief Complaint   Patient presents with    Abdominal Pain     Burning sensation after eats even if it is a small meal. Diarrhea and constipation on and off. Some nausea        History of Present Illness:     Trina Davis is a 73 year old female with past medical history of gastroparesis s/p Botox injections in May 2022, hiatal hernia with two repairs, CKD, COPD, and type 2 diabetes who presents with epigastric discomfort and pain. The pain has been particularly worse over the past week with associated nausea but without vomiting.  The pain is primarily localized to the epigastrium and left upper quadrant with burning sensation that has been occurring for several months, worse during and after eating, and particularly worse with hot and spicy foods. She does still take pantoprazole and pepcid, which have helped with relief of her heartburn, but she states this feels different than heartburn. She denies any recent or chronic use of NSAIDs, denies hematemesis, no blood in the stool including melena and hematochezia. She has been eating more frequent small portion meals as opposed to large meals which have helped.  She does report some degree of unintentional weight loss likely due to compromised appetite from epigastric discomfort.    The following portions of the patient's history were reviewed and updated as appropriate: allergies, current medications, past family history, past medical history, past social history, past surgical history and problem list.     Review of Systems:     Review of Systems   Constitutional:  Positive for appetite change and unexpected weight change. Negative for chills and fever.   HENT:  Negative for rhinorrhea and trouble swallowing.    Respiratory:  Negative for shortness of breath and wheezing.    Cardiovascular:  Negative for  chest pain and palpitations.   Gastrointestinal:  Positive for abdominal pain, diarrhea and nausea. Negative for blood in stool, constipation and vomiting.   Genitourinary:  Negative for dysuria and hematuria.   Musculoskeletal:  Negative for myalgias.   Skin:  Negative for color change and rash.   Neurological:  Negative for weakness and light-headedness.   Psychiatric/Behavioral:  Negative for behavioral problems and confusion.         Problem List:     Patient Active Problem List   Diagnosis    HLD (hyperlipidemia)    SHAYAN (obstructive sleep apnea)    History of recurrent UTIs    CKD (chronic kidney disease) stage 4, GFR 15-29 ml/min ([facility])    Acquired hypothyroidism    RLS (restless legs syndrome)    Vitamin D deficiency    Fibromyalgia    Type 2 diabetes mellitus with stage 4 chronic kidney disease, with long-term current use of insulin ([facility])    Gastroesophageal reflux disease without esophagitis    Gastroparesis diabeticorum     Epigastric pain    COPD (chronic obstructive pulmonary disease) ([facility])    Chronic constipation    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    Seasonal allergies    Osteoporosis    Anemia    Palpitations    Varicose veins of both lower extremities with pain    Postural dizziness with presyncope    Mild dementia without behavioral disturbance, psychotic disturbance, mood disturbance, or anxiety ([facility])    Parenchymal renal hypertension    Anemia in stage 4 chronic kidney disease     Secondary hyperparathyroidism of renal origin ([facility])    B12 deficiency    History of cerebrovascular accident (CVA) due to ischemia    Mixed stress and urge urinary incontinence    Slow transit constipation    Chronic renal disease, stage V ([facility])        Objective:     /70 (BP Location: Right arm, Patient Position: Sitting, Cuff Size: Adult)   Pulse 60   Temp 98.3 °F (36.8 °C) (Tympanic)   Resp 12   Wt 72.6 kg (160 lb)   SpO2 99%   BMI 29.26 kg/m²     Physical Exam  Constitutional:        General: She is not in acute distress.     Appearance: She is well-developed. She is not ill-appearing.   HENT:      Head: Normocephalic and atraumatic.      Right Ear: External ear normal.      Left Ear: External ear normal.   Eyes:      Extraocular Movements: Extraocular movements intact.      Conjunctiva/sclera: Conjunctivae normal.   Cardiovascular:      Rate and Rhythm: Normal rate and regular rhythm.      Heart sounds: Normal heart sounds, S1 normal and S2 normal. No murmur heard.  Pulmonary:      Effort: Pulmonary effort is normal. No respiratory distress.      Breath sounds: Normal breath sounds. No stridor. No decreased breath sounds or wheezing.   Abdominal:      General: Abdomen is flat. Bowel sounds are normal. There is no distension.      Palpations: Abdomen is soft. There is no fluid wave.      Tenderness: There is abdominal tenderness in the epigastric area and left upper quadrant.   Musculoskeletal:      Right lower leg: No edema.      Left lower leg: No edema.   Skin:     General: Skin is warm and dry.   Neurological:      Mental Status: She is alert and oriented to person, place, and time.   Psychiatric:         Behavior: Behavior normal. Behavior is cooperative.         Pertinent Laboratory/Diagnostic Studies:    Laboratory Results: I have personally reviewed the pertinent laboratory results/reports     Radiology/Other Diagnostic Testing Results: I have personally reviewed pertinent reports.      Yolanda Ramírez DO  [facility] INTERNAL MEDICINE [place]

## 2024-01-29 DIAGNOSIS — R39.9 UTI SYMPTOMS: Primary | ICD-10-CM

## 2024-01-30 ENCOUNTER — TELEPHONE (OUTPATIENT)
Dept: INTERNAL MEDICINE CLINIC | Facility: CLINIC | Age: 74
End: 2024-01-30

## 2024-01-30 NOTE — TELEPHONE ENCOUNTER
Patient was given oxygen in hospital back in march 2023, she is not using it and has asked us to put in a discontinue order for her for the oxygen to adapt.  We can fax to [phone].

## 2024-01-30 NOTE — TELEPHONE ENCOUNTER
Spoke with patient about her recent request about discontinuing her home oxygen. I explained to her that she needs to follow up with her Pulmonologist as she has a history of SHAYAN that requires CPAP/[employer]. She reports she does not use any device at night nor does she use the oxygen. I stressed the important of utilization and recommended close follow up with Pulmonology. She also explained that she no longer wants any additional modalities or interventions. Understands and agrees with plan, all questions answered.

## 2024-01-31 ENCOUNTER — TELEPHONE (OUTPATIENT)
Dept: NEPHROLOGY | Facility: CLINIC | Age: 74
End: 2024-01-31

## 2024-01-31 ENCOUNTER — LAB (OUTPATIENT)
Dept: LAB | Facility: AMBULARY SURGERY CENTER | Age: 74
End: 2024-01-31
Payer: MEDICARE

## 2024-01-31 ENCOUNTER — HOSPITAL ENCOUNTER (OUTPATIENT)
Dept: RADIOLOGY | Facility: HOSPITAL | Age: 74
Discharge: HOME | End: 2024-01-31
Payer: MEDICARE

## 2024-01-31 ENCOUNTER — TELEPHONE (OUTPATIENT)
Dept: GASTROENTEROLOGY | Facility: CLINIC | Age: 74
End: 2024-01-31

## 2024-01-31 ENCOUNTER — OFFICE VISIT (OUTPATIENT)
Dept: GASTROENTEROLOGY | Facility: CLINIC | Age: 74
End: 2024-01-31
Payer: MEDICARE

## 2024-01-31 VITALS
DIASTOLIC BLOOD PRESSURE: 70 MMHG | SYSTOLIC BLOOD PRESSURE: 114 MMHG | WEIGHT: 163 LBS | TEMPERATURE: 97.7 F | BODY MASS INDEX: 30 KG/M2 | HEIGHT: 62 IN

## 2024-01-31 DIAGNOSIS — K31.84 GASTROPARESIS DIABETICORUM: ICD-10-CM

## 2024-01-31 DIAGNOSIS — E11.43 GASTROPARESIS DIABETICORUM: ICD-10-CM

## 2024-01-31 DIAGNOSIS — R39.9 UTI SYMPTOMS: ICD-10-CM

## 2024-01-31 DIAGNOSIS — R10.13 EPIGASTRIC PAIN: Primary | ICD-10-CM

## 2024-01-31 DIAGNOSIS — R10.13 EPIGASTRIC PAIN: ICD-10-CM

## 2024-01-31 DIAGNOSIS — K59.01 SLOW TRANSIT CONSTIPATION: Chronic | ICD-10-CM

## 2024-01-31 DIAGNOSIS — K21.9 GASTROESOPHAGEAL REFLUX DISEASE WITHOUT ESOPHAGITIS: Chronic | ICD-10-CM

## 2024-01-31 DIAGNOSIS — R63.4 WEIGHT LOSS, UNINTENTIONAL: ICD-10-CM

## 2024-01-31 LAB
BACTERIA UR QL AUTO: NORMAL /HPF
BILIRUB UR QL STRIP: NEGATIVE
CLARITY UR: CLEAR
COLOR UR: COLORLESS
GLUCOSE UR STRIP-MCNC: NEGATIVE MG/DL
HGB UR QL STRIP.AUTO: NEGATIVE
KETONES UR STRIP-MCNC: NEGATIVE MG/DL
LEUKOCYTE ESTERASE UR QL STRIP: NEGATIVE
NITRITE UR QL STRIP: NEGATIVE
NON-SQ EPI CELLS URNS QL MICRO: NORMAL /HPF
PH UR STRIP.AUTO: 6 [PH]
PROT UR STRIP-MCNC: ABNORMAL MG/DL
RBC #/AREA URNS AUTO: NORMAL /HPF
SP GR UR STRIP.AUTO: 1.01 (ref 1–1.03)
UROBILINOGEN UR STRIP-ACNC: <2 MG/DL
WBC #/AREA URNS AUTO: NORMAL /HPF

## 2024-01-31 PROCEDURE — 81001 URINALYSIS AUTO W/SCOPE: CPT

## 2024-01-31 PROCEDURE — 99214 OFFICE O/P EST MOD 30 MIN: CPT | Performed by: PHYSICIAN ASSISTANT

## 2024-01-31 PROCEDURE — 74018 RADEX ABDOMEN 1 VIEW: CPT

## 2024-01-31 NOTE — PATIENT INSTRUCTIONS
Scheduled date of EGD(as of today): 2/29/24  Physician performing EGD: Dr. Jernigan   [facility]   Instructions reviewed with patient by: Monserrat (in office)  Clearances:  N/A

## 2024-01-31 NOTE — RESULT ENCOUNTER NOTE
Please call [facility]ina/[facility]  and tell them that creatinine came down a little bit 3.06.  It is still elevated but relatively stable.  Please make sure that she is on the reduced dose of losartan 25 mg twice a day.  Thank you.  Please let me know if Trina is on the lower dose of losartan.  Thank you.

## 2024-01-31 NOTE — TELEPHONE ENCOUNTER
----- Message from RAMIRO Deutsch sent at 1/31/2024  2:25 PM EST -----  Please call Trina/[facility]ina's  and tell them that creatinine came down a little bit 3.06.  It is still elevated but relatively stable.  Please make sure that she is on the reduced dose of losartan 25 mg twice a day.  Thank you.  Please let me know if Trina is on the lower dose of losartan.  Thank you.

## 2024-01-31 NOTE — PROGRESS NOTES
[facility] - Outpatient Follow-up Note  Trina Davis 73 y.o. female MRN: [id number]  Encounter: [id number]    ASSESSMENT AND PLAN:    1. Epigastric pain  2. Weight loss, unintentional  3. Gastroesophageal reflux disease without esophagitis  4. Gastroparesis diabeticorum   Patient with epigastric pain, weight loss x 1 week despite being on PPI BID and H2B HS x several months. She has a history of gastroparesis s/p botox injections to pylorus in May 2022 although she does not feel this pain is typical of her gastroparesis symptoms. She would like to avoid Botox in the future.     Discussed with patient that her pain may be due to underlying flare of GERD/ gastritis although I think this is unlikely given she is already on maximal acid suppression with PPI. She did have relief with carafate however   At this time I recommend she continue pantoprazole 40 mg BID   Continue famotidine 10 mg at bedtime   Continue small frequent meals, GERD/ gastroparesis diet and lifesyle   Will plan for EGD - she will be on a clear liquid diet for 1 and 1/2 day prior to procedure     - Ambulatory Referral to Gastroenterology  - EGD; Future    5. Slow transit constipation  I did discuss with the patient that perhaps her pain is secondary to constipation. Will obtain X ray. I recommended patient start Miralax powder OTC every other day and monitor symptoms     - XR abdomen 1 view kub; Future    Patient was instructed to call the office with any questions, concerns, new/ worsening/ persisting GI symptoms. Advised patient go to the ER with any severe or worsening abdominal pain, fevers/ chills, intractable N/V, chest pain, SOB, dizziness, lightheadedness, feeling something stuck in esophagus that will not go down. Patient expressed understanding and is in agreement with treatment plan.     Will plan to follow up after diagnostic tests   __________________________________________________________    SUBJECTIVE:       Patient is new to me. Patient with past medical history of gastroparesis s/p Botox injections in May 2022, hiatal hernia, CKD, COPD, and type 2 diabetes, prior cholecystectomy who presents to the office today for follow up with complaints of abdominal pain.   Patient was last seen in the office 4/4/2023, previous office note was reviewed.     Patient's  is present for visit today,     Patient presents with CC of abdominal pain x 1 week.   Pain located in upper abdomen. Describes as cramping. Pain worse after eating. Pain would radiate to LUQ and left flank.   Pain comes and goes. No specific food triggers.   She has a history of GERD and gastroparesis but she declines any increase in heartburn or acid reflux. She tells me this does not feel like her gastroparesis. She tells me she would like to avoid getting Botox injections again in the future,   She notes that when pain first started she was feeling more constipated so she took a laxative. She does have some relief of pain with a BM. She tends towards constipation at baseline, she uses senna a few times/ week. She tells me she has a BM 3-4 x/ week at baseline. The stool is brown and formed, can be in pieces. She does report occasional straining with Bms.   She has been pantoprazole 40 mg BID and famotidine 10 mg at bedtime x several months.   She saw her PCP about 1 week ago who started patient on Carafate QID x 5 days and referred here with recommendation to consider repeat EGD.    She feels like the Carafate has significantly helped her pain.   She has lost 5 lbs since last visit. She is not trying to lose weight. She is eating less due to the pain.   Patient denies any fevers/ chills, early satiety, nausea, vomiting, black or bloody stools, dysphagia, odynophagia.   Denies chest pain, SOB,    She denies NSAID use     Review of Systems   Constitutional:  Positive for appetite change (decreased) and unexpected weight change (weight loss). Negative for  "chills and fever.   HENT:  Negative for ear pain and sore throat.    Eyes:  Negative for pain and visual disturbance.   Respiratory:  Negative for cough and shortness of breath.    Cardiovascular:  Negative for chest pain and palpitations.   Gastrointestinal:  Positive for abdominal pain and nausea. Negative for constipation, diarrhea and vomiting.   Genitourinary:  Negative for dysuria, frequency and hematuria.   Musculoskeletal:  Positive for myalgias. Negative for arthralgias and back pain.   Skin:  Negative for color change and rash.   Neurological:  Negative for seizures, syncope and headaches.   All other systems reviewed and are negative.         Historical Information   Past Medical History:   Diagnosis Date    Actinic keratosis     Acute cystitis without hematuria 04/28/2023    Acute cystitis without hematuria 04/28/2023    Acute-on-chronic kidney injury      NIKOS (acute kidney injury) (Formerly [facility]) 05/08/2020    Allergic     Allergic rhinitis     Ambulatory dysfunction 10/22/2020    AMS (altered mental status) 07/14/2020    Anesthesia     pt reports \"had to use double lumen endo tube for hiatal hernia repair/so surgeon could get to where he needed to work\"    Arthritis     Aspiration into airway     Michael esophagus 1993    Lot of stress with children    Basal cell carcinoma 2007    left cheek     BCC (basal cell carcinoma) 05/27/2021    Left Nasal tip    Breast discharge 06/19/2023    Breast pain 06/19/2023    Cancer (HCC)     squamous cell cancer on forhead    Cancer (HCC)     basal cell on nose     Cholelithiasis     Chronic kidney disease 2000, 2018    Stones, kidney disease stage 4    Chronic pain disorder     bilat feet and joint pain on occas    Colon polyp     Confusion 10/30/2023    Constipation     COPD (chronic obstructive pulmonary disease) (Formerly [facility])     COVID-19 08/2021    recovered at home/did receive monoclonal infusion    COVID-19 virus infection 08/16/2021    Dental bridge present     Depression     " Diabetes mellitus (HCC)     Type 2    Diabetic neuropathy (HCC)     Difficulty walking 2017    Disease of thyroid gland     Dyspnea     Elevated liver enzymes 04/04/2023    Epigastric abdominal pain with severe diabetic gastroparesis, status post EGD/Botox injection, 5/14 05/17/2021    Facial abrasion, initial encounter 03/22/2023    Fall 03/22/2023    Family history of thyroid problem     Fatty liver     Fibromyalgia, primary     Fracture of orbital floor, blow-out, right, closed, with routine healing, subsequent encounter 03/22/2023    GERD (gastroesophageal reflux disease)     Heart burn     Hiatal hernia     History of cholecystectomy 10/27/2023    History of colon polyps 07/30/2021    History of kidney stones 09/29/2020    Hx of recurrent kidney stones    History of kidney stones 09/29/2020    Hx of recurrent kidney stones  Formatting of this note might be different from the original. Hx of recurrent kidney stones  Last Assessment & Plan:  Formatting of this note might be different from the original. We will set her up for follow-up in 3 months with a KUB prior.  She knows to call if she has any kidney stone type pain in the meantime.    History of Nissen fundoplication 10/27/2023    History of pneumonia     History of repair of hiatal hernia 05/03/2020    Hyperlipidemia     Hyperplasia, parathyroid (HCC)     Hypertension     Hypertensive urgency 10/27/2023    Hyponatremia 04/26/2023    Hypotension 04/13/2022    Kidney problem     Kidney stone     Left hip pain 10/05/2023    Leukocytosis 07/14/2020    Memory loss Julu2 2020    Motion sickness     Motion sickness     Multiple closed fractures of ribs of right side 03/22/2023    Nasal bones, closed fracture 03/22/2023    Neck pain     Obesity 1978    Obesity (BMI 30.0-34.9)     Other chest pain 09/13/2021    Other fatigue 09/21/2023    Peripheral neuropathy     Pollen allergies     RLS (restless legs syndrome)     S/P foot surgery 07/20/2022    SCC (squamous cell  carcinoma) 05/04/2021    left mid forehead    SCC (squamous cell carcinoma) 2023    chest    Seasonal allergies     Shingles 01 05 23    Sleep apnea     has inspire    Squamous cell skin cancer 2007    left cheek     Swollen ankles     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, left     great toe fusion    Urinary incontinence     Urinary tract infection 03/28/2022    Wears glasses      Past Surgical History:   Procedure Laterality Date    ABDOMINAL SURGERY  1997,2015,1972    Nissen x2 1972 tubal ligarion    ARTHROSCOPY KNEE      BREAST BIOPSY      stereotactic-benign    BREAST BIOPSY      stereo-benign    BREAST CYST EXCISION Bilateral     benign    BREAST EXCISIONAL BIOPSY      unknown date-benign    BREAST EXCISIONAL BIOPSY      unknown date-benign    BREAST EXCISIONAL BIOPSY      unknown date-benign    BREAST EXCISIONAL BIOPSY      unknown age-benign    CARDIAC CATHETERIZATION      CHOLECYSTECTOMY      COLONOSCOPY      EXAMINATION UNDER ANESTHESIA N/A 06/24/2021    Procedure: EXAM UNDER ANESTHESIA (EUA), DISE;  Surgeon: Gregory Maier MD;  Location: [facility];  Service: ENT    HERNIA REPAIR      HIATAL HERNIA REPAIR      KNEE SURGERY      Torn maniscus lap surg    LIPOSUCTION      LYMPH NODE BIOPSY      MOHS SURGERY  05/20/2021    left mid forehead-Mickey    MOHS SURGERY Left 05/27/2021    Left nasal tip- mickey     MO LIGATION/BIOPSY TEMPORAL ARTERY Left 01/05/2023    Procedure: BIOPSY ARTERY TEMPORAL;  Surgeon: Alec Dennis DO;  Location: [facility];  Service: Vascular    MO OPEN IMPLANTATION CRANIAL NERVE BOOM & PULSE GEN N/A 11/10/2021    Procedure: INSERTION UPPER AIRWAY STIMULATOR, INSPIRE IMPLANT;  Surgeon: Gregory Maier MD;  Location: [facility];  Service: ENT    MO UNLISTED PROCEDURE FOOT/TOES Right 07/19/2022    Procedure: 1st metatarsal phalangeal joint fusion;  Surgeon: Dede Bonner DPM;  Location: [facility];  Service: Podiatry    REDUCTION MAMMAPLASTY Bilateral 2000    REDUCTION MAMMAPLASTY       SKIN BIOPSY      SKIN CANCER EXCISION  2007    squamous cell carcinoma     SKIN CANCER EXCISION      basal cell carcinoma    SKIN CANCER EXCISION      SCCIS chest    SQUAMOUS CELL CARCINOMA EXCISION      TOE SURGERY Right 2022    Right Great Toe Fusion    TONSILLECTOMY      TUBAL LIGATION      UPPER GASTROINTESTINAL ENDOSCOPY      US GUIDED BREAST BIOPSY RIGHT COMPLETE Right 2022     Social History   Social History     Substance and Sexual Activity   Alcohol Use Yes    Comment: 1 or 2 a year     Social History     Substance and Sexual Activity   Drug Use No     Social History     Tobacco Use   Smoking Status Former    Current packs/day: 0.00    Average packs/day: 2.0 packs/day for 10.0 years (20.0 ttl pk-yrs)    Types: Cigarettes    Start date: 1966    Quit date: 1976    Years since quittin.1    Passive exposure: Past   Smokeless Tobacco Never   Tobacco Comments    Smoked 2 pack a day     Family History   Problem Relation Age of Onset    Heart disease Mother     Depression Mother     Hypertension Mother     COPD Mother     Hearing loss Mother     Anxiety disorder Mother     Heart disease Father     Lung cancer Father 70        Smoker     Cancer Father         brain    Alcohol abuse Father     Dementia Father     Hypertension Father     Thyroid disease Father     COPD Father     Arthritis Father     Brain cancer Father 74    Hypertension Sister     Diabetes Sister     Heart disease Sister     Thyroid disease Sister     Cancer Sister         Lympoma, lung    Lung cancer Sister 79    Anxiety disorder Sister     Migraines Sister     Hypertension Brother     Diabetes Brother     Cancer Brother         Throat    Dementia Brother     Stroke Brother     Hypertension Brother     Heart disease Brother     Diabetes Brother     Hypertension Brother     No Known Problems Son     No Known Problems Son     Brain cancer Paternal Aunt         unknown age    Diabetes Sister     Hypertension Sister      Diabetes Brother     Breast cancer Neg Hx        Meds/Allergies       Current Outpatient Medications:     albuterol (Ventolin HFA) 90 mcg/act inhaler    amLODIPine (NORVASC) 5 mg tablet    aspirin (ECOTRIN LOW STRENGTH) 81 mg EC tablet    B-D ULTRAFINE III SHORT PEN 31G X 8 MM MISC    Calcium Citrate 1040 MG TABS    cholecalciferol (VITAMIN D3) 1,000 units tablet    Coenzyme Q10 (CoQ10) 100 MG CAPS    gabapentin (NEURONTIN) 100 mg capsule    hydrALAZINE (APRESOLINE) 25 mg tablet    Icosapent Ethyl 1 g CAPS    insulin aspart (NovoLOG) 100 units/mL injection    insulin detemir (Levemir FlexTouch) 100 Units/mL injection pen    Lactobacillus (PROBIOTIC ACIDOPHILUS PO)    levothyroxine 137 mcg tablet    losartan (COZAAR) 25 mg tablet    methenamine hippurate (HIPREX) 1 g tablet    metoprolol tartrate (LOPRESSOR) 50 mg tablet    pantoprazole (PROTONIX) 40 mg tablet    pramipexole (MIRAPEX) 0.25 mg tablet    Restasis 0.05 % ophthalmic emulsion    rosuvastatin (CRESTOR) 10 MG tablet    spironolactone (ALDACTONE) 25 mg tablet    sucralfate (CARAFATE) 1 g tablet    vitamin B-12 (VITAMIN B-12) 1,000 mcg tablet    famotidine (PEPCID) 10 mg tablet    ferrous sulfate 324 MG TBEC    fluticasone (FLONASE) 50 mcg/act nasal spray    senna (SENOKOT) 8.6 mg    torsemide (DEMADEX) 10 mg tablet    Allergies   Allergen Reactions    Ciprofloxacin Hives    Sulfamethoxazole-Trimethoprim Tongue Swelling           Objective     Wt Readings from Last 3 Encounters:   01/31/24 73.9 kg (163 lb)   01/26/24 72.6 kg (160 lb)   01/18/24 76.2 kg (168 lb)     Temp Readings from Last 3 Encounters:   01/31/24 97.7 °F (36.5 °C) (Tympanic)   01/26/24 98.3 °F (36.8 °C) (Tympanic)   01/10/24 (!) 96.8 °F (36 °C) (Temporal)     BP Readings from Last 3 Encounters:   01/31/24 114/70   01/26/24 120/70   01/18/24 90/60     Pulse Readings from Last 3 Encounters:   01/26/24 60   01/18/24 66   01/10/24 62          PHYSICAL EXAM:      Physical Exam  Vitals  reviewed.   Constitutional:       General: She is not in acute distress.     Appearance: She is not toxic-appearing.   HENT:      Head: Normocephalic and atraumatic.   Eyes:      Extraocular Movements: Extraocular movements intact.      Conjunctiva/sclera: Conjunctivae normal.   Cardiovascular:      Rate and Rhythm: Normal rate and regular rhythm.   Pulmonary:      Effort: Pulmonary effort is normal. No respiratory distress.      Breath sounds: Normal breath sounds.   Abdominal:      General: Bowel sounds are normal.      Palpations: Abdomen is soft.      Tenderness: There is abdominal tenderness in the left upper quadrant.   Musculoskeletal:         General: No swelling or tenderness.      Cervical back: Normal range of motion and neck supple.   Skin:     General: Skin is warm and dry.      Coloration: Skin is not jaundiced.   Neurological:      General: No focal deficit present.      Mental Status: She is alert and oriented to person, place, and time. Mental status is at baseline.   Psychiatric:         Mood and Affect: Mood normal.         Behavior: Behavior normal.         Thought Content: Thought content normal.          Lab Results:   Appointment on 01/31/2024   Component Date Value    Color, UA 01/31/2024 Colorless     Clarity, UA 01/31/2024 Clear     Specific Gravity, UA 01/31/2024 1.008     pH, UA 01/31/2024 6.0     Leukocytes, UA 01/31/2024 Negative     Nitrite, UA 01/31/2024 Negative     Protein, UA 01/31/2024 200 (2+) (A)     Glucose, UA 01/31/2024 Negative     Ketones, UA 01/31/2024 Negative     Urobilinogen, UA 01/31/2024 <2.0     Bilirubin, UA 01/31/2024 Negative     Occult Blood, UA 01/31/2024 Negative     RBC, UA 01/31/2024 None Seen     WBC, UA 01/31/2024 1-2     Epithelial Cells 01/31/2024 Occasional     Bacteria, UA 01/31/2024 Occasional        Lab Results   Component Value Date    WBC 10.51 (H) 12/22/2023    HGB 13.2 12/22/2023    HCT 40.3 12/22/2023    MCV 92 12/22/2023     12/22/2023        Lab Results   Component Value Date    SODIUM 135 01/25/2024    K 4.4 01/25/2024     01/25/2024    CO2 24 01/25/2024    AGAP 6 01/25/2024    BUN 60 (H) 01/25/2024    CREATININE 3.06 (H) 01/25/2024    GLUC 152 (H) 12/12/2023    GLUF 151 (H) 01/25/2024    CALCIUM 9.5 01/25/2024    AST 27 12/22/2023    ALT 30 12/22/2023    ALKPHOS 60 12/22/2023    TP 6.3 (L) 12/22/2023    TBILI 0.32 12/22/2023    EGFR 14 01/25/2024     Lab Results   Component Value Date    ZCX3ROLWQUDF 6.643 (H) 01/25/2024    TSH 0.89 03/06/2020       Lab Results   Component Value Date    CRP 3.3 (H) 01/03/2023       Prior Procedure Results/Reports   Last EGD was completed 5/20/2022 for GERD and gastroparesis which showed a medium sized sliding hital hernia without chintan lesions. Single diverticulum in lower esophagus. Severe generalized edematous and erythematous mucosa in the stomach with a moderate amount of food in the gastric fundus. Copious bile noted in gastric lumen. Duodenum was normal. A Total of 200 units of Botox was injected into the pylorus.  Biopsies negative for celiac disease and H pylori. Post this procedure it was recommended patient continue protonix 40 mg in the AM and famotidine 40 mg at bedtime.     Last Colonoscopy reviewed done in 10/2021 and showed seven colon adenomatous  polyps which were removed, left sided diverticulosis, small internal hemorrhoids. Repeat colonoscopy recommended in 3 years due to a personal history of colon polyps.     Marianne Velázquez PA-C   Available on [employer]

## 2024-01-31 NOTE — H&P (VIEW-ONLY)
[facility] - Outpatient Follow-up Note  Trina Davis 73 y.o. female MRN: [id number]  Encounter: [id number]    ASSESSMENT AND PLAN:    1. Epigastric pain  2. Weight loss, unintentional  3. Gastroesophageal reflux disease without esophagitis  4. Gastroparesis diabeticorum   Patient with epigastric pain, weight loss x 1 week despite being on PPI BID and H2B HS x several months. She has a history of gastroparesis s/p botox injections to pylorus in May 2022 although she does not feel this pain is typical of her gastroparesis symptoms. She would like to avoid Botox in the future.     Discussed with patient that her pain may be due to underlying flare of GERD/ gastritis although I think this is unlikely given she is already on maximal acid suppression with PPI. She did have relief with carafate however   At this time I recommend she continue pantoprazole 40 mg BID   Continue famotidine 10 mg at bedtime   Continue small frequent meals, GERD/ gastroparesis diet and lifesyle   Will plan for EGD - she will be on a clear liquid diet for 1 and 1/2 day prior to procedure     - Ambulatory Referral to Gastroenterology  - EGD; Future    5. Slow transit constipation  I did discuss with the patient that perhaps her pain is secondary to constipation. Will obtain X ray. I recommended patient start Miralax powder OTC every other day and monitor symptoms     - XR abdomen 1 view kub; Future    Patient was instructed to call the office with any questions, concerns, new/ worsening/ persisting GI symptoms. Advised patient go to the ER with any severe or worsening abdominal pain, fevers/ chills, intractable N/V, chest pain, SOB, dizziness, lightheadedness, feeling something stuck in esophagus that will not go down. Patient expressed understanding and is in agreement with treatment plan.     Will plan to follow up after diagnostic tests   __________________________________________________________    SUBJECTIVE:       Patient is new to me. Patient with past medical history of gastroparesis s/p Botox injections in May 2022, hiatal hernia, CKD, COPD, and type 2 diabetes, prior cholecystectomy who presents to the office today for follow up with complaints of abdominal pain.   Patient was last seen in the office 4/4/2023, previous office note was reviewed.     Patient's  is present for visit today,     Patient presents with CC of abdominal pain x 1 week.   Pain located in upper abdomen. Describes as cramping. Pain worse after eating. Pain would radiate to LUQ and left flank.   Pain comes and goes. No specific food triggers.   She has a history of GERD and gastroparesis but she declines any increase in heartburn or acid reflux. She tells me this does not feel like her gastroparesis. She tells me she would like to avoid getting Botox injections again in the future,   She notes that when pain first started she was feeling more constipated so she took a laxative. She does have some relief of pain with a BM. She tends towards constipation at baseline, she uses senna a few times/ week. She tells me she has a BM 3-4 x/ week at baseline. The stool is brown and formed, can be in pieces. She does report occasional straining with Bms.   She has been pantoprazole 40 mg BID and famotidine 10 mg at bedtime x several months.   She saw her PCP about 1 week ago who started patient on Carafate QID x 5 days and referred here with recommendation to consider repeat EGD.    She feels like the Carafate has significantly helped her pain.   She has lost 5 lbs since last visit. She is not trying to lose weight. She is eating less due to the pain.   Patient denies any fevers/ chills, early satiety, nausea, vomiting, black or bloody stools, dysphagia, odynophagia.   Denies chest pain, SOB,    She denies NSAID use     Review of Systems   Constitutional:  Positive for appetite change (decreased) and unexpected weight change (weight loss). Negative for  "chills and fever.   HENT:  Negative for ear pain and sore throat.    Eyes:  Negative for pain and visual disturbance.   Respiratory:  Negative for cough and shortness of breath.    Cardiovascular:  Negative for chest pain and palpitations.   Gastrointestinal:  Positive for abdominal pain and nausea. Negative for constipation, diarrhea and vomiting.   Genitourinary:  Negative for dysuria, frequency and hematuria.   Musculoskeletal:  Positive for myalgias. Negative for arthralgias and back pain.   Skin:  Negative for color change and rash.   Neurological:  Negative for seizures, syncope and headaches.   All other systems reviewed and are negative.         Historical Information   Past Medical History:   Diagnosis Date    Actinic keratosis     Acute cystitis without hematuria 04/28/2023    Acute cystitis without hematuria 04/28/2023    Acute-on-chronic kidney injury      NIKOS (acute kidney injury) ([facility]) 05/08/2020    Allergic     Allergic rhinitis     Ambulatory dysfunction 10/22/2020    AMS (altered mental status) 07/14/2020    Anesthesia     pt reports \"had to use double lumen endo tube for hiatal hernia repair/so surgeon could get to where he needed to work\"    Arthritis     Aspiration into airway     Michael esophagus 1993    Lot of stress with children    Basal cell carcinoma 2007    left cheek     BCC (basal cell carcinoma) 05/27/2021    Left Nasal tip    Breast discharge 06/19/2023    Breast pain 06/19/2023    Cancer (HCC)     squamous cell cancer on forhead    Cancer (HCC)     basal cell on nose     Cholelithiasis     Chronic kidney disease 2000, 2018    Stones, kidney disease stage 4    Chronic pain disorder     bilat feet and joint pain on occas    Colon polyp     Confusion 10/30/2023    Constipation     COPD (chronic obstructive pulmonary disease) ([facility])     COVID-19 08/2021    recovered at home/did receive monoclonal infusion    COVID-19 virus infection 08/16/2021    Dental bridge present     Depression     " Diabetes mellitus (HCC)     Type 2    Diabetic neuropathy (HCC)     Difficulty walking 2017    Disease of thyroid gland     Dyspnea     Elevated liver enzymes 04/04/2023    Epigastric abdominal pain with severe diabetic gastroparesis, status post EGD/Botox injection, 5/14 05/17/2021    Facial abrasion, initial encounter 03/22/2023    Fall 03/22/2023    Family history of thyroid problem     Fatty liver     Fibromyalgia, primary     Fracture of orbital floor, blow-out, right, closed, with routine healing, subsequent encounter 03/22/2023    GERD (gastroesophageal reflux disease)     Heart burn     Hiatal hernia     History of cholecystectomy 10/27/2023    History of colon polyps 07/30/2021    History of kidney stones 09/29/2020    Hx of recurrent kidney stones    History of kidney stones 09/29/2020    Hx of recurrent kidney stones  Formatting of this note might be different from the original. Hx of recurrent kidney stones  Last Assessment & Plan:  Formatting of this note might be different from the original. We will set her up for follow-up in 3 months with a KUB prior.  She knows to call if she has any kidney stone type pain in the meantime.    History of Nissen fundoplication 10/27/2023    History of pneumonia     History of repair of hiatal hernia 05/03/2020    Hyperlipidemia     Hyperplasia, parathyroid (HCC)     Hypertension     Hypertensive urgency 10/27/2023    Hyponatremia 04/26/2023    Hypotension 04/13/2022    Kidney problem     Kidney stone     Left hip pain 10/05/2023    Leukocytosis 07/14/2020    Memory loss Julu2 2020    Motion sickness     Motion sickness     Multiple closed fractures of ribs of right side 03/22/2023    Nasal bones, closed fracture 03/22/2023    Neck pain     Obesity 1978    Obesity (BMI 30.0-34.9)     Other chest pain 09/13/2021    Other fatigue 09/21/2023    Peripheral neuropathy     Pollen allergies     RLS (restless legs syndrome)     S/P foot surgery 07/20/2022    SCC (squamous cell  carcinoma) 05/04/2021    left mid forehead    SCC (squamous cell carcinoma) 2023    chest    Seasonal allergies     Shingles 01 05 23    Sleep apnea     has inspire    Squamous cell skin cancer 2007    left cheek     Swollen ankles     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, left     great toe fusion    Urinary incontinence     Urinary tract infection 03/28/2022    Wears glasses      Past Surgical History:   Procedure Laterality Date    ABDOMINAL SURGERY  1997,2015,1972    Nissen x2 1972 tubal ligarion    ARTHROSCOPY KNEE      BREAST BIOPSY      stereotactic-benign    BREAST BIOPSY      stereo-benign    BREAST CYST EXCISION Bilateral     benign    BREAST EXCISIONAL BIOPSY      unknown date-benign    BREAST EXCISIONAL BIOPSY      unknown date-benign    BREAST EXCISIONAL BIOPSY      unknown date-benign    BREAST EXCISIONAL BIOPSY      unknown age-benign    CARDIAC CATHETERIZATION      CHOLECYSTECTOMY      COLONOSCOPY      EXAMINATION UNDER ANESTHESIA N/A 06/24/2021    Procedure: EXAM UNDER ANESTHESIA (EUA), DISE;  Surgeon: Gregory Maier MD;  [facility]: [facility];  Service: ENT    HERNIA REPAIR      HIATAL HERNIA REPAIR      KNEE SURGERY      Torn maniscus lap surg    LIPOSUCTION      LYMPH NODE BIOPSY      MOHS SURGERY  05/20/2021    left mid forehead-Mickey    MOHS SURGERY Left 05/27/2021    Left nasal tip- mickey     FL LIGATION/BIOPSY TEMPORAL ARTERY Left 01/05/2023    Procedure: BIOPSY ARTERY TEMPORAL;  Surgeon: Alec Dennis DO;  [facility]: [facility];  Service: Vascular    FL OPEN IMPLANTATION CRANIAL NERVE BOOM & PULSE GEN N/A 11/10/2021    Procedure: INSERTION UPPER AIRWAY STIMULATOR, INSPIRE IMPLANT;  Surgeon: Gregory Maier MD;  Location: [facility];  Service: ENT    FL UNLISTED PROCEDURE FOOT/TOES Right 07/19/2022    Procedure: 1st metatarsal phalangeal joint fusion;  Surgeon: Dede Bonner DPM;  Location: [facility];  Service: Podiatry    REDUCTION MAMMAPLASTY Bilateral 2000    REDUCTION MAMMAPLASTY       SKIN BIOPSY      SKIN CANCER EXCISION  2007    squamous cell carcinoma     SKIN CANCER EXCISION      basal cell carcinoma    SKIN CANCER EXCISION      SCCIS chest    SQUAMOUS CELL CARCINOMA EXCISION      TOE SURGERY Right 2022    Right Great Toe Fusion    TONSILLECTOMY      TUBAL LIGATION      UPPER GASTROINTESTINAL ENDOSCOPY      US GUIDED BREAST BIOPSY RIGHT COMPLETE Right 2022     Social History   Social History     Substance and Sexual Activity   Alcohol Use Yes    Comment: 1 or 2 a year     Social History     Substance and Sexual Activity   Drug Use No     Social History     Tobacco Use   Smoking Status Former    Current packs/day: 0.00    Average packs/day: 2.0 packs/day for 10.0 years (20.0 ttl pk-yrs)    Types: Cigarettes    Start date: 1966    Quit date: 1976    Years since quittin.1    Passive exposure: Past   Smokeless Tobacco Never   Tobacco Comments    Smoked 2 pack a day     Family History   Problem Relation Age of Onset    Heart disease Mother     Depression Mother     Hypertension Mother     COPD Mother     Hearing loss Mother     Anxiety disorder Mother     Heart disease Father     Lung cancer Father 70        Smoker     Cancer Father         brain    Alcohol abuse Father     Dementia Father     Hypertension Father     Thyroid disease Father     COPD Father     Arthritis Father     Brain cancer Father 74    Hypertension Sister     Diabetes Sister     Heart disease Sister     Thyroid disease Sister     Cancer Sister         Lympoma, lung    Lung cancer Sister 79    Anxiety disorder Sister     Migraines Sister     Hypertension Brother     Diabetes Brother     Cancer Brother         Throat    Dementia Brother     Stroke Brother     Hypertension Brother     Heart disease Brother     Diabetes Brother     Hypertension Brother     No Known Problems Son     No Known Problems Son     Brain cancer Paternal Aunt         unknown age    Diabetes Sister     Hypertension Sister      Diabetes Brother     Breast cancer Neg Hx        Meds/Allergies       Current Outpatient Medications:     albuterol (Ventolin HFA) 90 mcg/act inhaler    amLODIPine (NORVASC) 5 mg tablet    aspirin (ECOTRIN LOW STRENGTH) 81 mg EC tablet    B-D ULTRAFINE III SHORT PEN 31G X 8 MM MISC    Calcium Citrate 1040 MG TABS    cholecalciferol (VITAMIN D3) 1,000 units tablet    Coenzyme Q10 (CoQ10) 100 MG CAPS    gabapentin (NEURONTIN) 100 mg capsule    hydrALAZINE (APRESOLINE) 25 mg tablet    Icosapent Ethyl 1 g CAPS    insulin aspart (NovoLOG) 100 units/mL injection    insulin detemir (Levemir FlexTouch) 100 Units/mL injection pen    Lactobacillus (PROBIOTIC ACIDOPHILUS PO)    levothyroxine 137 mcg tablet    losartan (COZAAR) 25 mg tablet    methenamine hippurate (HIPREX) 1 g tablet    metoprolol tartrate (LOPRESSOR) 50 mg tablet    pantoprazole (PROTONIX) 40 mg tablet    pramipexole (MIRAPEX) 0.25 mg tablet    Restasis 0.05 % ophthalmic emulsion    rosuvastatin (CRESTOR) 10 MG tablet    spironolactone (ALDACTONE) 25 mg tablet    sucralfate (CARAFATE) 1 g tablet    vitamin B-12 (VITAMIN B-12) 1,000 mcg tablet    famotidine (PEPCID) 10 mg tablet    ferrous sulfate 324 MG TBEC    fluticasone (FLONASE) 50 mcg/act nasal spray    senna (SENOKOT) 8.6 mg    torsemide (DEMADEX) 10 mg tablet    Allergies   Allergen Reactions    Ciprofloxacin Hives    Sulfamethoxazole-Trimethoprim Tongue Swelling           Objective     Wt Readings from Last 3 Encounters:   01/31/24 73.9 kg (163 lb)   01/26/24 72.6 kg (160 lb)   01/18/24 76.2 kg (168 lb)     Temp Readings from Last 3 Encounters:   01/31/24 97.7 °F (36.5 °C) (Tympanic)   01/26/24 98.3 °F (36.8 °C) (Tympanic)   01/10/24 (!) 96.8 °F (36 °C) (Temporal)     BP Readings from Last 3 Encounters:   01/31/24 114/70   01/26/24 120/70   01/18/24 90/60     Pulse Readings from Last 3 Encounters:   01/26/24 60   01/18/24 66   01/10/24 62          PHYSICAL EXAM:      Physical Exam  Vitals  reviewed.   Constitutional:       General: She is not in acute distress.     Appearance: She is not toxic-appearing.   HENT:      Head: Normocephalic and atraumatic.   Eyes:      Extraocular Movements: Extraocular movements intact.      Conjunctiva/sclera: Conjunctivae normal.   Cardiovascular:      Rate and Rhythm: Normal rate and regular rhythm.   Pulmonary:      Effort: Pulmonary effort is normal. No respiratory distress.      Breath sounds: Normal breath sounds.   Abdominal:      General: Bowel sounds are normal.      Palpations: Abdomen is soft.      Tenderness: There is abdominal tenderness in the left upper quadrant.   Musculoskeletal:         General: No swelling or tenderness.      Cervical back: Normal range of motion and neck supple.   Skin:     General: Skin is warm and dry.      Coloration: Skin is not jaundiced.   Neurological:      General: No focal deficit present.      Mental Status: She is alert and oriented to person, place, and time. Mental status is at baseline.   Psychiatric:         Mood and Affect: Mood normal.         Behavior: Behavior normal.         Thought Content: Thought content normal.          Lab Results:   Appointment on 01/31/2024   Component Date Value    Color, UA 01/31/2024 Colorless     Clarity, UA 01/31/2024 Clear     Specific Gravity, UA 01/31/2024 1.008     pH, UA 01/31/2024 6.0     Leukocytes, UA 01/31/2024 Negative     Nitrite, UA 01/31/2024 Negative     Protein, UA 01/31/2024 200 (2+) (A)     Glucose, UA 01/31/2024 Negative     Ketones, UA 01/31/2024 Negative     Urobilinogen, UA 01/31/2024 <2.0     Bilirubin, UA 01/31/2024 Negative     Occult Blood, UA 01/31/2024 Negative     RBC, UA 01/31/2024 None Seen     WBC, UA 01/31/2024 1-2     Epithelial Cells 01/31/2024 Occasional     Bacteria, UA 01/31/2024 Occasional        Lab Results   Component Value Date    WBC 10.51 (H) 12/22/2023    HGB 13.2 12/22/2023    HCT 40.3 12/22/2023    MCV 92 12/22/2023     12/22/2023        Lab Results   Component Value Date    SODIUM 135 01/25/2024    K 4.4 01/25/2024     01/25/2024    CO2 24 01/25/2024    AGAP 6 01/25/2024    BUN 60 (H) 01/25/2024    CREATININE 3.06 (H) 01/25/2024    GLUC 152 (H) 12/12/2023    GLUF 151 (H) 01/25/2024    CALCIUM 9.5 01/25/2024    AST 27 12/22/2023    ALT 30 12/22/2023    ALKPHOS 60 12/22/2023    TP 6.3 (L) 12/22/2023    TBILI 0.32 12/22/2023    EGFR 14 01/25/2024     Lab Results   Component Value Date    CJZ7VQNIRXWY 6.643 (H) 01/25/2024    TSH 0.89 03/06/2020       Lab Results   Component Value Date    CRP 3.3 (H) 01/03/2023       Prior Procedure Results/Reports   Last EGD was completed 5/20/2022 for GERD and gastroparesis which showed a medium sized sliding hital hernia without chintan lesions. Single diverticulum in lower esophagus. Severe generalized edematous and erythematous mucosa in the stomach with a moderate amount of food in the gastric fundus. Copious bile noted in gastric lumen. Duodenum was normal. A Total of 200 units of Botox was injected into the pylorus.  Biopsies negative for celiac disease and H pylori. Post this procedure it was recommended patient continue protonix 40 mg in the AM and famotidine 40 mg at bedtime.     Last Colonoscopy reviewed done in 10/2021 and showed seven colon adenomatous  polyps which were removed, left sided diverticulosis, small internal hemorrhoids. Repeat colonoscopy recommended in 3 years due to a personal history of colon polyps.     Marianne Velázquez PA-C   Available on [employer]

## 2024-02-01 NOTE — RESULT ENCOUNTER NOTE
Patient notified of urine test done and informed all was within normal range. Verbalized understanding.

## 2024-02-04 ENCOUNTER — HOSPITAL ENCOUNTER (EMERGENCY)
Facility: HOSPITAL | Age: 74
Discharge: HOME | End: 2024-02-04
Attending: EMERGENCY MEDICINE
Payer: MEDICARE

## 2024-02-04 VITALS
SYSTOLIC BLOOD PRESSURE: 147 MMHG | DIASTOLIC BLOOD PRESSURE: 69 MMHG | TEMPERATURE: 97.8 F | BODY MASS INDEX: 30.12 KG/M2 | HEART RATE: 65 BPM | OXYGEN SATURATION: 95 % | WEIGHT: 164.68 LBS | RESPIRATION RATE: 20 BRPM

## 2024-02-04 DIAGNOSIS — R11.2 NAUSEA AND VOMITING: Primary | ICD-10-CM

## 2024-02-04 LAB
ALBUMIN SERPL BCP-MCNC: 3.5 G/DL (ref 3.5–5)
ALP SERPL-CCNC: 46 U/L (ref 34–104)
ALT SERPL W P-5'-P-CCNC: 20 U/L (ref 7–52)
ANION GAP SERPL CALCULATED.3IONS-SCNC: 8 MMOL/L
AST SERPL W P-5'-P-CCNC: 11 U/L (ref 13–39)
BASOPHILS # BLD AUTO: 0.03 THOUSANDS/ÂΜL (ref 0–0.1)
BASOPHILS NFR BLD AUTO: 0 % (ref 0–1)
BILIRUB SERPL-MCNC: 0.31 MG/DL (ref 0.2–1)
BUN SERPL-MCNC: 60 MG/DL (ref 5–25)
CALCIUM SERPL-MCNC: 9.4 MG/DL (ref 8.4–10.2)
CHLORIDE SERPL-SCNC: 110 MMOL/L (ref 96–108)
CO2 SERPL-SCNC: 21 MMOL/L (ref 21–32)
CREAT SERPL-MCNC: 2.71 MG/DL (ref 0.6–1.3)
EOSINOPHIL # BLD AUTO: 0.32 THOUSAND/ÂΜL (ref 0–0.61)
EOSINOPHIL NFR BLD AUTO: 3 % (ref 0–6)
ERYTHROCYTE [DISTWIDTH] IN BLOOD BY AUTOMATED COUNT: 14 % (ref 11.6–15.1)
GFR SERPL CREATININE-BSD FRML MDRD: 16 ML/MIN/1.73SQ M
GLUCOSE SERPL-MCNC: 149 MG/DL (ref 65–140)
HCT VFR BLD AUTO: 35.8 % (ref 34.8–46.1)
HGB BLD-MCNC: 11.4 G/DL (ref 11.5–15.4)
IMM GRANULOCYTES # BLD AUTO: 0.08 THOUSAND/UL (ref 0–0.2)
IMM GRANULOCYTES NFR BLD AUTO: 1 % (ref 0–2)
LIPASE SERPL-CCNC: 15 U/L (ref 11–82)
LYMPHOCYTES # BLD AUTO: 2.65 THOUSANDS/ÂΜL (ref 0.6–4.47)
LYMPHOCYTES NFR BLD AUTO: 26 % (ref 14–44)
MCH RBC QN AUTO: 29.8 PG (ref 26.8–34.3)
MCHC RBC AUTO-ENTMCNC: 31.8 G/DL (ref 31.4–37.4)
MCV RBC AUTO: 94 FL (ref 82–98)
MONOCYTES # BLD AUTO: 0.59 THOUSAND/ÂΜL (ref 0.17–1.22)
MONOCYTES NFR BLD AUTO: 6 % (ref 4–12)
NEUTROPHILS # BLD AUTO: 6.35 THOUSANDS/ÂΜL (ref 1.85–7.62)
NEUTS SEG NFR BLD AUTO: 64 % (ref 43–75)
NRBC BLD AUTO-RTO: 0 /100 WBCS
PLATELET # BLD AUTO: 261 THOUSANDS/UL (ref 149–390)
PMV BLD AUTO: 12.2 FL (ref 8.9–12.7)
POTASSIUM SERPL-SCNC: 4.3 MMOL/L (ref 3.5–5.3)
PROT SERPL-MCNC: 5.7 G/DL (ref 6.4–8.4)
RBC # BLD AUTO: 3.83 MILLION/UL (ref 3.81–5.12)
SODIUM SERPL-SCNC: 139 MMOL/L (ref 135–147)
WBC # BLD AUTO: 10.02 THOUSAND/UL (ref 4.31–10.16)

## 2024-02-04 PROCEDURE — 80053 COMPREHEN METABOLIC PANEL: CPT | Performed by: EMERGENCY MEDICINE

## 2024-02-04 PROCEDURE — 96376 TX/PRO/DX INJ SAME DRUG ADON: CPT

## 2024-02-04 PROCEDURE — 83690 ASSAY OF LIPASE: CPT | Performed by: EMERGENCY MEDICINE

## 2024-02-04 PROCEDURE — 99284 EMERGENCY DEPT VISIT MOD MDM: CPT

## 2024-02-04 PROCEDURE — 99284 EMERGENCY DEPT VISIT MOD MDM: CPT | Performed by: EMERGENCY MEDICINE

## 2024-02-04 PROCEDURE — 36415 COLL VENOUS BLD VENIPUNCTURE: CPT | Performed by: EMERGENCY MEDICINE

## 2024-02-04 PROCEDURE — 96374 THER/PROPH/DIAG INJ IV PUSH: CPT

## 2024-02-04 PROCEDURE — 85025 COMPLETE CBC W/AUTO DIFF WBC: CPT | Performed by: EMERGENCY MEDICINE

## 2024-02-04 RX ORDER — ONDANSETRON 2 MG/ML
4 INJECTION INTRAMUSCULAR; INTRAVENOUS ONCE
Status: COMPLETED | OUTPATIENT
Start: 2024-02-04 | End: 2024-02-04

## 2024-02-04 RX ORDER — ONDANSETRON 4 MG/1
4 TABLET, FILM COATED ORAL EVERY 6 HOURS
Qty: 30 TABLET | Refills: 0 | Status: SHIPPED | OUTPATIENT
Start: 2024-02-04

## 2024-02-04 RX ADMIN — ONDANSETRON 4 MG: 2 INJECTION INTRAMUSCULAR; INTRAVENOUS at 06:24

## 2024-02-04 RX ADMIN — ONDANSETRON 4 MG: 2 INJECTION INTRAMUSCULAR; INTRAVENOUS at 07:15

## 2024-02-04 NOTE — ED PROVIDER NOTES
"History  Chief Complaint   Patient presents with    Vomiting     Patient reports eating left over pizza tonight at 2300. States she began with nausea @ 0000. States uncontrollable vomiting for past 30 min. +abd pain rates 7/10 Reports \" taking a few tums\" prior to arriving. Denies any improvement with tums.      73-year-old female with history of DM2, gastroparesis, GERD/gastritis, SHAYAN, CKD4, history of recurrent kidney stones, breast cancer presented to the ED with complaints of persistent nausea that started around midnight and progressed to vomiting 30 minutes prior to arrival associated with sharp/crampy diffuse abdominal pain.  Patient states that she was in her normal state of health earlier today, however ate a slice of pizza that was sitting out for a few hours around 11 PM yesterday, with persistent nausea developing 1 hour after.  Since then she has vomited at least 5-10 times.  Denies hematemesis.  States worsening abdominal pain due to the retching.  Denies changes in her bowels, or changes in her urine.  Pain is different from her chronic epigastric pain secondary to gastroparesis/gastritis/GERD.  Denies recent fevers or chills, cough, congestion, sore throat, neck pain/stiffness, chest pain, shortness of breath, back pain, bloody/tarry stool, rash, leg pain or leg swelling.  Denies recent sick contacts.  Denies recent travel.  Reports multiple prior abdominal surgeries.        Prior to Admission Medications   Prescriptions Last Dose Informant Patient Reported? Taking?   B-D ULTRAFINE III SHORT PEN 31G X 8 MM MISC  Self, Spouse/Significant Other Yes No   Calcium Citrate 1040 MG TABS  Self, Spouse/Significant Other Yes No   Sig: Take 500 mg by mouth Three times a day   Coenzyme Q10 (CoQ10) 100 MG CAPS  Self, Spouse/Significant Other Yes No   Sig: Take 100 mg by mouth in the morning Bed time   Icosapent Ethyl 1 g CAPS  Self, Spouse/Significant Other No No   Sig: Take 2 capsules (2 g total) by mouth 2 " (two) times a day   Lactobacillus (PROBIOTIC ACIDOPHILUS PO)  Self, Spouse/Significant Other Yes No   Sig: Take 1 capsule by mouth 2 (two) times a day   Restasis 0.05 % ophthalmic emulsion  Spouse/Significant Other Yes No   Sig: Administer 1 drop to both eyes every 12 (twelve) hours   albuterol (Ventolin HFA) 90 mcg/act inhaler  Self, Spouse/Significant Other No No   Sig: Inhale 2 puffs every 6 (six) hours as needed for wheezing   amLODIPine (NORVASC) 5 mg tablet  Spouse/Significant Other No No   Sig: Take 1 tablet (5 mg total) by mouth daily   aspirin (ECOTRIN LOW STRENGTH) 81 mg EC tablet  Self, Spouse/Significant Other No No   Sig: Take 1 tablet (81 mg total) by mouth daily Do not start before 2023.   cholecalciferol (VITAMIN D3) 1,000 units tablet  Self, Spouse/Significant Other No No   Sig: Take 2 tablets (2,000 Units total) by mouth daily   Patient taking differently: Take 2,000 Units by mouth daily On hold for eight weeks then start again   famotidine (PEPCID) 10 mg tablet  Self, Spouse/Significant Other No No   Sig: Take 1 tablet (10 mg total) by mouth daily Do not start before 2023.   ferrous sulfate 324 MG TBEC  Self, Spouse/Significant Other No No   Sig: TAKE 1 TABLET (324 MG TOTAL) BY MOUTH EVERY OTHER DAY   Patient not taking: Reported on 2024   fluticasone (FLONASE) 50 mcg/act nasal spray   No No   Si spray into each nostril daily for 14 days   Patient not taking: Reported on 2024   gabapentin (NEURONTIN) 100 mg capsule  Spouse/Significant Other Yes No   Sig: Take 200 mg by mouth daily at bedtime   hydrALAZINE (APRESOLINE) 25 mg tablet  Spouse/Significant Other No No   Sig: TAKE 1 TABLET BY MOUTH TWICE A DAY   insulin aspart (NovoLOG) 100 units/mL injection  Self, Spouse/Significant Other Yes No   Sig: Inject under the skin 3 (three) times a day before meals 18 units, 18 units, 22 units.   insulin detemir (Levemir FlexTouch) 100 Units/mL injection pen  Self,  Spouse/Significant Other Yes No   Sig: Inject 50 Units under the skin every evening    levothyroxine 137 mcg tablet  Spouse/Significant Other Yes No   Sig: Take 137 mcg by mouth daily   losartan (COZAAR) 25 mg tablet  Spouse/Significant Other No No   Sig: Take 1 tablet (25 mg total) by mouth 2 (two) times a day   methenamine hippurate (HIPREX) 1 g tablet  Spouse/Significant Other No No   Sig: Take 1 tablet (1 g total) by mouth 2 (two) times a day with meals   metoprolol tartrate (LOPRESSOR) 50 mg tablet  Self, Spouse/Significant Other No No   Sig: Take 1 tablet (50 mg total) by mouth every 12 (twelve) hours   pantoprazole (PROTONIX) 40 mg tablet  Self, Spouse/Significant Other No No   Sig: Take 1 tablet (40 mg total) by mouth 2 (two) times a day   pramipexole (MIRAPEX) 0.25 mg tablet  Spouse/Significant Other No No   Sig: Take 1 tablet (0.25 mg total) by mouth daily at bedtime   rosuvastatin (CRESTOR) 10 MG tablet  Spouse/Significant Other Yes No   Sig: Take 10 mg by mouth daily   senna (SENOKOT) 8.6 mg  Spouse/Significant Other No No   Sig: Take 1 tablet (8.6 mg total) by mouth in the morning   Patient not taking: Reported on 1/31/2024   spironolactone (ALDACTONE) 25 mg tablet  Spouse/Significant Other No No   Sig: TAKE 1 TABLET (25 MG TOTAL) BY MOUTH DAILY.   sucralfate (CARAFATE) 1 g tablet  Spouse/Significant Other No No   Sig: Take 1 tablet (1 g total) by mouth 4 (four) times a day for 5 days   torsemide (DEMADEX) 10 mg tablet  Spouse/Significant Other No No   Sig: Take 0.5 tablets (5 mg total) by mouth daily as needed (weight gain)   Patient not taking: Reported on 1/26/2024   vitamin B-12 (VITAMIN B-12) 1,000 mcg tablet  Spouse/Significant Other No No   Sig: Take 1 tablet (1,000 mcg total) by mouth daily      Facility-Administered Medications: None       Past Medical History:   Diagnosis Date    Actinic keratosis     Acute cystitis without hematuria 04/28/2023    Acute cystitis without hematuria 04/28/2023     "Acute-on-chronic kidney injury      NIKOS (acute kidney injury) (HCC) 05/08/2020    Allergic     Allergic rhinitis     Ambulatory dysfunction 10/22/2020    AMS (altered mental status) 07/14/2020    Anesthesia     pt reports \"had to use double lumen endo tube for hiatal hernia repair/so surgeon could get to where he needed to work\"    Arthritis     Aspiration into airway     Michael esophagus 1993    Lot of stress with children    Basal cell carcinoma 2007    left cheek     BCC (basal cell carcinoma) 05/27/2021    Left Nasal tip    Breast discharge 06/19/2023    Breast pain 06/19/2023    Cancer (HCC)     squamous cell cancer on forhead    Cancer (HCC)     basal cell on nose     Cholelithiasis     Chronic kidney disease 2000, 2018    Stones, kidney disease stage 4    Chronic pain disorder     bilat feet and joint pain on occas    Colon polyp     Confusion 10/30/2023    Constipation     COPD (chronic obstructive pulmonary disease) (HCC)     COVID-19 08/2021    recovered at home/did receive monoclonal infusion    COVID-19 virus infection 08/16/2021    Dental bridge present     Depression     Diabetes mellitus (HCC)     Type 2    Diabetic neuropathy (HCC)     Difficulty walking 2017    Disease of thyroid gland     Dyspnea     Elevated liver enzymes 04/04/2023    Epigastric abdominal pain with severe diabetic gastroparesis, status post EGD/Botox injection, 5/14 05/17/2021    Facial abrasion, initial encounter 03/22/2023    Fall 03/22/2023    Family history of thyroid problem     Fatty liver     Fibromyalgia, primary     Fracture of orbital floor, blow-out, right, closed, with routine healing, subsequent encounter 03/22/2023    GERD (gastroesophageal reflux disease)     Heart burn     Hiatal hernia     History of cholecystectomy 10/27/2023    History of colon polyps 07/30/2021    History of kidney stones 09/29/2020    Hx of recurrent kidney stones    History of kidney stones 09/29/2020    Hx of recurrent kidney stones  " Formatting of this note might be different from the original. Hx of recurrent kidney stones  Last Assessment & Plan:  Formatting of this note might be different from the original. We will set her up for follow-up in 3 months with a KUB prior.  She knows to call if she has any kidney stone type pain in the meantime.    History of Nissen fundoplication 10/27/2023    History of pneumonia     History of repair of hiatal hernia 05/03/2020    Hyperlipidemia     Hyperplasia, parathyroid (HCC)     Hypertension     Hypertensive urgency 10/27/2023    Hyponatremia 04/26/2023    Hypotension 04/13/2022    Kidney problem     Kidney stone     Left hip pain 10/05/2023    Leukocytosis 07/14/2020    Memory loss Julu2 2020    Motion sickness     Motion sickness     Multiple closed fractures of ribs of right side 03/22/2023    Nasal bones, closed fracture 03/22/2023    Neck pain     Obesity 1978    Obesity (BMI 30.0-34.9)     Other chest pain 09/13/2021    Other fatigue 09/21/2023    Peripheral neuropathy     Pollen allergies     RLS (restless legs syndrome)     S/P foot surgery 07/20/2022    SCC (squamous cell carcinoma) 05/04/2021    left mid forehead    SCC (squamous cell carcinoma) 2023    chest    Seasonal allergies     Shingles 01 05 23    Sleep apnea     has inspire    Squamous cell skin cancer 2007    left cheek     Swollen ankles     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, left     great toe fusion    Urinary incontinence     Urinary tract infection 03/28/2022    Wears glasses        Past Surgical History:   Procedure Laterality Date    ABDOMINAL SURGERY  1997,2015,1972    Nissen x2 1972 tubal ligarion    ARTHROSCOPY KNEE      BREAST BIOPSY      stereotactic-benign    BREAST BIOPSY      stereo-benign    BREAST CYST EXCISION Bilateral     benign    BREAST EXCISIONAL BIOPSY      unknown date-benign    BREAST EXCISIONAL BIOPSY      unknown date-benign    BREAST EXCISIONAL BIOPSY      unknown date-benign    BREAST EXCISIONAL BIOPSY       unknown age-benign    CARDIAC CATHETERIZATION      CHOLECYSTECTOMY      COLONOSCOPY      EXAMINATION UNDER ANESTHESIA N/A 06/24/2021    Procedure: EXAM UNDER ANESTHESIA (EUA), DISE;  Surgeon: Gregory Maier MD;  Location: [facility];  Service: ENT    HERNIA REPAIR      HIATAL HERNIA REPAIR      KNEE SURGERY      Torn maniscus lap surg    LIPOSUCTION      LYMPH NODE BIOPSY      MOHS SURGERY  05/20/2021    left mid forehead-Mickey    MOHS SURGERY Left 05/27/2021    Left nasal tip- mickey     OH LIGATION/BIOPSY TEMPORAL ARTERY Left 01/05/2023    Procedure: BIOPSY ARTERY TEMPORAL;  Surgeon: Alec Dennis DO;  Location: [facility];  Service: Vascular    OH OPEN IMPLANTATION CRANIAL NERVE BOOM & PULSE GEN N/A 11/10/2021    Procedure: INSERTION UPPER AIRWAY STIMULATOR, INSPIRE IMPLANT;  Surgeon: Gregory Maier MD;  Location: [facility];  Service: ENT    OH UNLISTED PROCEDURE FOOT/TOES Right 07/19/2022    Procedure: 1st metatarsal phalangeal joint fusion;  Surgeon: Dede Bonner DPM;  Location: [facility];  Service: Podiatry    REDUCTION MAMMAPLASTY Bilateral 2000    REDUCTION MAMMAPLASTY      SKIN BIOPSY      SKIN CANCER EXCISION  2007    squamous cell carcinoma     SKIN CANCER EXCISION  2007    basal cell carcinoma    SKIN CANCER EXCISION  2023    SCCIS chest    SQUAMOUS CELL CARCINOMA EXCISION      TOE SURGERY Right 08/04/2022    Right Great Toe Fusion    TONSILLECTOMY      TUBAL LIGATION      UPPER GASTROINTESTINAL ENDOSCOPY      US GUIDED BREAST BIOPSY RIGHT COMPLETE Right 07/18/2022       Family History   Problem Relation Age of Onset    Heart disease Mother     Depression Mother     Hypertension Mother     COPD Mother     Hearing loss Mother     Anxiety disorder Mother     Heart disease Father     Lung cancer Father 70        Smoker     Cancer Father         brain    Alcohol abuse Father     Dementia Father     Hypertension Father     Thyroid disease Father     COPD Father     Arthritis Father     Brain  cancer Father 74    Hypertension Sister     Diabetes Sister     Heart disease Sister     Thyroid disease Sister     Cancer Sister         Lympoma, lung    Lung cancer Sister 79    Anxiety disorder Sister     Migraines Sister     Hypertension Brother     Diabetes Brother     Cancer Brother         Throat    Dementia Brother     Stroke Brother     Hypertension Brother     Heart disease Brother     Diabetes Brother     Hypertension Brother     No Known Problems Son     No Known Problems Son     Brain cancer Paternal Aunt         unknown age    Diabetes Sister     Hypertension Sister     Diabetes Brother     Breast cancer Neg Hx      I have reviewed and agree with the history as documented.    E-Cigarette/Vaping    E-Cigarette Use Never User      E-Cigarette/Vaping Substances    Nicotine No     THC No     CBD No     Flavoring No     Other No     Unknown No      Social History     Tobacco Use    Smoking status: Former     Current packs/day: 0.00     Average packs/day: 2.0 packs/day for 10.0 years (20.0 ttl pk-yrs)     Types: Cigarettes     Start date: 1966     Quit date: 1976     Years since quittin.1     Passive exposure: Past    Smokeless tobacco: Never    Tobacco comments:     Smoked 2 pack a day   Vaping Use    Vaping status: Never Used   Substance Use Topics    Alcohol use: Yes     Comment: 1 or 2 a year    Drug use: No        Review of Systems   Constitutional:  Negative for chills and fever.   HENT:  Negative for congestion and sore throat.    Eyes:  Negative for photophobia, pain, redness and visual disturbance.   Respiratory:  Negative for cough, chest tightness and shortness of breath.    Cardiovascular:  Negative for chest pain and palpitations.   Gastrointestinal:  Positive for abdominal pain, nausea and vomiting. Negative for blood in stool, constipation and diarrhea.   Genitourinary:  Negative for difficulty urinating, dysuria, flank pain, frequency, hematuria, pelvic pain, urgency, vaginal  bleeding, vaginal discharge and vaginal pain.   Musculoskeletal:  Negative for arthralgias, back pain, neck pain and neck stiffness.   Skin:  Negative for color change and rash.   Neurological:  Negative for dizziness, seizures, syncope, light-headedness, numbness and headaches.   All other systems reviewed and are negative.      Physical Exam  ED Triage Vitals   Temperature Pulse Respirations Blood Pressure SpO2   02/04/24 0544 02/04/24 0545 02/04/24 0544 02/04/24 0544 02/04/24 0544   97.8 °F (36.6 °C) 63 (!) 24 (!) 189/85 97 %      Temp Source Heart Rate Source Patient Position - Orthostatic VS BP Location FiO2 (%)   02/04/24 0544 02/04/24 0629 02/04/24 0544 02/04/24 0544 --   Oral Monitor Sitting Right arm       Pain Score       --                    Orthostatic Vital Signs  Vitals:    02/04/24 0544 02/04/24 0545 02/04/24 0629   BP: (!) 189/85  147/69   Pulse:  63 65   Patient Position - Orthostatic VS: Sitting  Sitting       Physical Exam  Vitals and nursing note reviewed.   Constitutional:       General: She is in acute distress.      Appearance: She is not toxic-appearing.   HENT:      Head: Normocephalic and atraumatic.      Right Ear: External ear normal.      Left Ear: External ear normal.      Nose: Nose normal. No congestion.      Mouth/Throat:      Mouth: Mucous membranes are dry.      Pharynx: Oropharynx is clear. No oropharyngeal exudate.   Eyes:      Extraocular Movements: Extraocular movements intact.      Conjunctiva/sclera: Conjunctivae normal.      Pupils: Pupils are equal, round, and reactive to light.   Cardiovascular:      Rate and Rhythm: Normal rate and regular rhythm.      Pulses: Normal pulses.      Heart sounds: Normal heart sounds. No murmur heard.  Pulmonary:      Effort: Pulmonary effort is normal. No respiratory distress.      Breath sounds: No stridor. Wheezing (Mild scattered.) present. No rhonchi or rales.   Abdominal:      General: Abdomen is flat.      Tenderness: There is no  abdominal tenderness. There is no right CVA tenderness, left CVA tenderness, guarding or rebound.   Musculoskeletal:         General: No tenderness. Normal range of motion.      Cervical back: Normal range of motion and neck supple. No rigidity or tenderness.      Right lower leg: No edema.      Left lower leg: No edema.   Skin:     General: Skin is warm and dry.      Capillary Refill: Capillary refill takes less than 2 seconds.      Coloration: Skin is not jaundiced or pale.      Findings: No bruising, erythema, lesion or rash.   Neurological:      General: No focal deficit present.      Mental Status: She is alert and oriented to person, place, and time. Mental status is at baseline.      GCS: GCS eye subscore is 4. GCS verbal subscore is 5. GCS motor subscore is 6.      Cranial Nerves: Cranial nerves 2-12 are intact.      Sensory: Sensation is intact.      Motor: Motor function is intact.   Psychiatric:         Mood and Affect: Mood normal.         Behavior: Behavior normal.         ED Medications  Medications   ondansetron (ZOFRAN) injection 4 mg (4 mg Intravenous Given 2/4/24 0624)   ondansetron (ZOFRAN) injection 4 mg (4 mg Intravenous Given 2/4/24 0715)       Diagnostic Studies  Results Reviewed       Procedure Component Value Units Date/Time    Comprehensive metabolic panel [[id number]]  (Abnormal) Collected: 02/04/24 0644    Lab Status: Final result Specimen: Blood from Arm, Left Updated: 02/04/24 0713     Sodium 139 mmol/L      Potassium 4.3 mmol/L      Chloride 110 mmol/L      CO2 21 mmol/L      ANION GAP 8 mmol/L      BUN 60 mg/dL      Creatinine 2.71 mg/dL      Glucose 149 mg/dL      Calcium 9.4 mg/dL      AST 11 U/L      ALT 20 U/L      Alkaline Phosphatase 46 U/L      Total Protein 5.7 g/dL      Albumin 3.5 g/dL      Total Bilirubin 0.31 mg/dL      eGFR 16 ml/min/1.73sq m     Narrative:      National Kidney Disease Foundation guidelines for Chronic Kidney Disease (CKD):     Stage 1 with normal or high  GFR (GFR > 90 mL/min/1.73 square meters)    Stage 2 Mild CKD (GFR = 60-89 mL/min/1.73 square meters)    Stage 3A Moderate CKD (GFR = 45-59 mL/min/1.73 square meters)    Stage 3B Moderate CKD (GFR = 30-44 mL/min/1.73 square meters)    Stage 4 Severe CKD (GFR = 15-29 mL/min/1.73 square meters)    Stage 5 End Stage CKD (GFR <15 mL/min/1.73 square meters)  Note: GFR calculation is accurate only with a steady state creatinine    Lipase [[id number]]  (Normal) Collected: 02/04/24 0644    Lab Status: Final result Specimen: Blood from Arm, Left Updated: 02/04/24 0713     Lipase 15 u/L     CBC and differential [[id number]]  (Abnormal) Collected: 02/04/24 0608    Lab Status: Final result Specimen: Blood from Arm, Left Updated: 02/04/24 0629     WBC 10.02 Thousand/uL      RBC 3.83 Million/uL      Hemoglobin 11.4 g/dL      Hematocrit 35.8 %      MCV 94 fL      MCH 29.8 pg      MCHC 31.8 g/dL      RDW 14.0 %      MPV 12.2 fL      Platelets 261 Thousands/uL      nRBC 0 /100 WBCs      Neutrophils Relative 64 %      Immat GRANS % 1 %      Lymphocytes Relative 26 %      Monocytes Relative 6 %      Eosinophils Relative 3 %      Basophils Relative 0 %      Neutrophils Absolute 6.35 Thousands/µL      Immature Grans Absolute 0.08 Thousand/uL      Lymphocytes Absolute 2.65 Thousands/µL      Monocytes Absolute 0.59 Thousand/µL      Eosinophils Absolute 0.32 Thousand/µL      Basophils Absolute 0.03 Thousands/µL                    No orders to display         Procedures  Procedures      ED Course  ED Course as of 02/04/24 0724   Sun Feb 04, 2024   0552 Respirations(!): 24   0552 Temperature: 97.8 °F (36.6 °C)   0552 Blood Pressure(!): 189/85   0630 CBC and differential(!)  No leukocytosis.  Mild anemia at 11.4, down from 13.21-month ago.  Patient without complaints of any bleeding, bloody/tarry stools, hematemesis.  Her hemoglobins have ranged from 11-13 over the past 3 months.  Do not suspect acute bleed at this time. ?Anemia secondary to  CKD. patient otherwise hemodynamically stable.  Platelets WNL.  CBC otherwise grossly unremarkable.   0636 Patient reports improvement in her nausea after Zofran.  Declined pain medication for her abdominal pain.  Reviewed CBC thus far with patient.  Discussed possible need for CT abdomen pelvis if patient with persistent abdominal pain, however she states she has had multiple in the past and states that pain is better after Zofran.  Declines CT abdomen pelvis at this time.   0643 Respirations: 20   0644 Blood Pressure: 147/69   0712 On repeat abdominal exam, there is no focal tenderness to palpation.  Patient reports minor nausea, without further episodes of vomiting.  She would like another dose of Zofran here in the ED.   0714 Lipase: 15  WNL.   0715 Creatinine(!): 2.71  Appears to be at patient's baseline.   0715 BUN(!): 60  Unchanged from 10 days ago.   0715 Comprehensive metabolic panel(!)  No acute electrolyte abnormality.  Renal function at baseline.  LFTs not elevated.  CMP grossly unremarkable.   0716 Lab work unremarkable.  Patient given repeat dose of Zofran per her request that she can have time in between discharge and picking up her prescription.  Repeat abdominal exam benign.  Patient still complains of mild burning epigastric pain consistent with her longstanding GERD/gastritis/gastroparesis.  No chest pain.  Doubt ACS at this time.  Low suspicion for acute SBO or other acute intra-abdominal surgical/infectious process.  Patient has follow-up scheduled with GI in the next 2 weeks, which she was advised to keep.  She was also advised to follow-up with her PCP.  Reviewed strict return precautions with patient and she is agreeable with discharge plan.                             SBIRT 22yo+      Flowsheet Row Most Recent Value   Initial Alcohol Screen: US AUDIT-C     1. How often do you have a drink containing alcohol? 0 Filed at: 02/04/2024 0543   2. How many drinks containing alcohol do you have on a  "typical day you are drinking?  0 Filed at: 02/04/2024 0543   3a. Male UNDER 65: How often do you have five or more drinks on one occasion? 0 Filed at: 02/04/2024 0543   3b. FEMALE Any Age, or MALE 65+: How often do you have 4 or more drinks on one occassion? 0 Filed at: 02/04/2024 0543   Audit-C Score 0 Filed at: 02/04/2024 0543   VALE: How many times in the past year have you...    Used an illegal drug or used a prescription medication for non-medical reasons? Never Filed at: 02/04/2024 0543                  Medical Decision Making  Patient with history as above presented to triage with CC of \" Patient presents with:  Vomiting: Patient reports eating left over pizza tonight at 2300. States she began with nausea @ 0000. States uncontrollable vomiting for past 30 min. +abd pain rates 7/10 Reports \" taking a few tums\" prior to arriving. Denies any improvement with tums.    \"    Hx obtained from pt and spouse     73-year-old female with history of gastroparesis, DM 2, GERD and hernia repairs.  Presented to the ED with complaints of nausea and persistent vomiting after eating a pizza slice had been sitting out for a few hours.  Denies any changes in her bowels or urine.  Otherwise was feeling in her normal state of health prior.  No hematemesis or other bleeding noted.  Patient afebrile.  Mild abdominal tenderness secondary to retching, without focal tenderness to palpation on exam.  No CVA tenderness.  No urinary symptoms.  Will check basic labs, and give IV Zofran.  Patient reports passing bowel movement daily, last passed this morning.  Doubt acute bowel obstruction at this time.  Abdomen without peritoneal signs, no CVA tenderness.  Doubt acute intra-abdominal infectious process.  Will hold off on imaging at this time and patient agrees after shared decision-making.  Patient without chest pain or shortness of breath, low suspicion for ACS.    Plan: Labs, antiemetics, reassessment, if labs without acute abnormality " plan to discharge patient home with Rx Zofran and outpatient follow-up with GI/PCP.    Patient was nontoxic appearing and stable. Exam as above. Ambulatory and Tolerating PO.     I have independently ordered, reviewed and interpreted the following: labs and/or imaging studies listed above  Reviewed external records including notes, and prior labs/imaging results.    DDx including but not limited to: food poisoning, viral illness, metabolic abnormality, dehydration; doubt intracranial process, GI etiology, UTI, adverse reaction; doubt acute surgical intraabdominal process, Boorhave's syndrome, mediastinitis. .     Patient was treated with improvement in symptoms from the following:  IV Zofran    Consideration was given for admission, but the patient was stable for outpatient management.    Disposition: Discussed need for follow up with their primary doctor or specialist to review all results, including incidental findings as above. Patient discharged with explanation of ED workup and diagnosis, instructions on how to obtain outpatient follow up, care instructions at home, and strict return precautions if patient develops new or worsening symptoms. Patients questions answered and agreeable with discharge plan.     See ED Course for further MDM.      PLEASE NOTE:  This encounter was completed utilizing the [employer] Direct Speech Voice Recognition Software. Grammatical errors, random word insertions, pronoun errors and incomplete sentences are occasional inherent consequences of the system due to software limitations, ambient noise and hardware issues.These may be missed by proof reading prior to affixing electronic signature. Any questions or concerns about the content, text or information contained within the body of this dictation should be directly addressed to the physician for clarification. Please do not hesitate to call me directly if you have any questions or concerns.      Amount and/or Complexity of  Data Reviewed  Independent Historian: spouse  External Data Reviewed: labs, radiology and notes.  Labs: ordered. Decision-making details documented in ED Course.    Risk  Prescription drug management.          Disposition  Final diagnoses:   Nausea and vomiting     Time reflects when diagnosis was documented in both MDM as applicable and the Disposition within this note       Time User Action Codes Description Comment    2/4/2024  6:54 AM Jay Berry Add [R11.2] Nausea and vomiting           ED Disposition       ED Disposition   Discharge    Condition   Stable    Date/Time   Sun Feb 4, 2024 0718    Comment   Trina DUCKWORTH Ryan discharge to home/self care.                   Follow-up Information       Follow up With Specialties Details Why Contact Info    Tree Aguilar MD Internal Medicine  Please call tomorrow to schedule an appointment [address] [address]  [phone]              Patient's Medications   Discharge Prescriptions    ONDANSETRON (ZOFRAN) 4 MG TABLET    Take 1 tablet (4 mg total) by mouth every 6 (six) hours       Start Date: 2/4/2024  End Date: --       Order Dose: 4 mg       Quantity: 30 tablet    Refills: 0     No discharge procedures on file.    PDMP Review         Value Time User    PDMP Reviewed  Yes 3/24/2023  9:59 PM Hansel Burr MD             ED Provider  Attending physically available and evaluated Trina DUCKWORTH Ryan. I managed the patient along with the ED Attending.    Electronically Signed by           Jay Berry DO  02/04/24 0773

## 2024-02-04 NOTE — DISCHARGE INSTRUCTIONS
Today you were seen in the emergency department for nausea vomiting and abdominal pain. Your workup included labs. I believe your symptoms to be the result of food poisoning. At this time there does not appear to be an emergent life threatening cause to explain your symptoms. You are stable for discharge home with outpatient follow up.    We have prescribed you Zofran to take as needed every 6-8 hours for nausea.    Please follow up with your primary care provider in the next 2-3 days. Please review all results discussed today with your primary care provider.     Please return to the emergency department as soon as possible if you develop uncontrollable fevers (Temp >100.4), uncontrollable pain, vomiting, inability to pass bowel movements, inability to urinate, pain with urination, bleeding, chest pain, trouble breathing, or any other concerning symptoms.     Thank you for choosing [facility] for your care.

## 2024-02-05 ENCOUNTER — VBI (OUTPATIENT)
Dept: FAMILY MEDICINE CLINIC | Facility: CLINIC | Age: 74
End: 2024-02-05

## 2024-02-05 NOTE — TELEPHONE ENCOUNTER
02/05/24 11:35 AM    Patient contacted post ED visit, [facility] department spoke with patient/caregiver and outreach was successful.    Thank you.  MARGARITA CHEN  [facility]

## 2024-02-06 ENCOUNTER — OFFICE VISIT (OUTPATIENT)
Dept: INTERNAL MEDICINE CLINIC | Facility: CLINIC | Age: 74
End: 2024-02-06
Payer: MEDICARE

## 2024-02-06 VITALS
OXYGEN SATURATION: 96 % | RESPIRATION RATE: 20 BRPM | HEART RATE: 54 BPM | DIASTOLIC BLOOD PRESSURE: 64 MMHG | TEMPERATURE: 98.5 F | WEIGHT: 162.8 LBS | BODY MASS INDEX: 29.78 KG/M2 | SYSTOLIC BLOOD PRESSURE: 122 MMHG

## 2024-02-06 DIAGNOSIS — E11.43 GASTROPARESIS DIABETICORUM: Primary | ICD-10-CM

## 2024-02-06 DIAGNOSIS — K31.84 GASTROPARESIS DIABETICORUM: Primary | ICD-10-CM

## 2024-02-06 PROCEDURE — 99213 OFFICE O/P EST LOW 20 MIN: CPT | Performed by: INTERNAL MEDICINE

## 2024-02-06 NOTE — PROGRESS NOTES
Name: Trina Davis      : 1950      MRN: [id number]  Encounter Provider: Hansel Vu DO  Encounter Date: 2024   Encounter department: [facility] INTERNAL MEDICINE [place]    Assessment & Plan     1. Gastroparesis diabeticorum   Assessment & Plan:  ED visit on  for nausea and vomiting, was provided zofran which is helping  Saw GI on  who requested she use daily miralax, EGD planned later this month  Possibly viral in etiology or worsening of gastroparesis (confirmed on emptying study in )  Could consider reglan if no improvement  Asked her to call or visit the ED if she is unable to eat, stops having BM, or if her belly becomes distended/hard/painful               Subjective      Trina Davis presents for ED follow up. She visited the ED on  for nausea and vomiting. Denies any pain. Was provided zofran and discharged home. Her appetite has been poor since then but is slowly improving, she had eggs and toast last night for dinner. She has been having controlled nausea with zofran. She was recently seen by GI who started her on daily miralax and plans to do an EGD later this month. No pain with eating, no abdominal pain, no hematemesis, hematochezia, or melena.    She does have history of gastroparesis confirmed on emptying study in .       Review of Systems   Constitutional:  Positive for appetite change. Negative for chills and fever.   Gastrointestinal:  Positive for nausea. Negative for abdominal distention, abdominal pain, blood in stool, diarrhea and vomiting.   All other systems reviewed and are negative.      Current Outpatient Medications on File Prior to Visit   Medication Sig    albuterol (Ventolin HFA) 90 mcg/act inhaler Inhale 2 puffs every 6 (six) hours as needed for wheezing    amLODIPine (NORVASC) 5 mg tablet Take 1 tablet (5 mg total) by mouth daily    aspirin (ECOTRIN LOW STRENGTH) 81 mg EC tablet Take 1 tablet (81 mg total) by mouth daily Do not start before  October 31, 2023.    Calcium Citrate 1040 MG TABS Take 500 mg by mouth Three times a day    cholecalciferol (VITAMIN D3) 1,000 units tablet Take 2 tablets (2,000 Units total) by mouth daily (Patient taking differently: Take 2,000 Units by mouth daily On hold for 8 weeks staring end of January)    Coenzyme Q10 (CoQ10) 100 MG CAPS Take 100 mg by mouth in the morning Bed time    famotidine (PEPCID) 10 mg tablet Take 1 tablet (10 mg total) by mouth daily Do not start before September 13, 2023.    ferrous sulfate 324 MG TBEC TAKE 1 TABLET (324 MG TOTAL) BY MOUTH EVERY OTHER DAY (Patient taking differently: On hold until futher bloodwork)    gabapentin (NEURONTIN) 100 mg capsule Take 200 mg by mouth daily at bedtime Uses as needed for restless legs    hydrALAZINE (APRESOLINE) 25 mg tablet TAKE 1 TABLET BY MOUTH TWICE A DAY    Icosapent Ethyl 1 g CAPS Take 2 capsules (2 g total) by mouth 2 (two) times a day    insulin aspart (NovoLOG) 100 units/mL injection Inject under the skin 3 (three) times a day before meals 18 units, 18 units, 22 units.    insulin detemir (Levemir FlexTouch) 100 Units/mL injection pen Inject 50 Units under the skin every evening     Lactobacillus (PROBIOTIC ACIDOPHILUS PO) Take 1 capsule by mouth 2 (two) times a day    levothyroxine 137 mcg tablet Take 137 mcg by mouth daily    losartan (COZAAR) 25 mg tablet Take 1 tablet (25 mg total) by mouth 2 (two) times a day    methenamine hippurate (HIPREX) 1 g tablet Take 1 tablet (1 g total) by mouth 2 (two) times a day with meals    metoprolol tartrate (LOPRESSOR) 50 mg tablet Take 1 tablet (50 mg total) by mouth every 12 (twelve) hours    ondansetron (ZOFRAN) 4 mg tablet Take 1 tablet (4 mg total) by mouth every 6 (six) hours    pantoprazole (PROTONIX) 40 mg tablet Take 1 tablet (40 mg total) by mouth 2 (two) times a day    pramipexole (MIRAPEX) 0.25 mg tablet Take 1 tablet (0.25 mg total) by mouth daily at bedtime    Restasis 0.05 % ophthalmic emulsion  Administer 1 drop to both eyes every 12 (twelve) hours    rosuvastatin (CRESTOR) 10 MG tablet Take 10 mg by mouth daily    spironolactone (ALDACTONE) 25 mg tablet TAKE 1 TABLET (25 MG TOTAL) BY MOUTH DAILY.    [DISCONTINUED] senna (SENOKOT) 8.6 mg Take 1 tablet (8.6 mg total) by mouth in the morning (Patient taking differently: Take 8.6 mg by mouth in the morning Uses as needed)    B-D ULTRAFINE III SHORT PEN 31G X 8 MM MISC     [DISCONTINUED] fluticasone (FLONASE) 50 mcg/act nasal spray 1 spray into each nostril daily for 14 days (Patient not taking: Reported on 1/26/2024)    [DISCONTINUED] sucralfate (CARAFATE) 1 g tablet Take 1 tablet (1 g total) by mouth 4 (four) times a day for 5 days (Patient not taking: Reported on 2/6/2024)    [DISCONTINUED] torsemide (DEMADEX) 10 mg tablet Take 0.5 tablets (5 mg total) by mouth daily as needed (weight gain) (Patient not taking: Reported on 1/26/2024)    [DISCONTINUED] vitamin B-12 (VITAMIN B-12) 1,000 mcg tablet Take 1 tablet (1,000 mcg total) by mouth daily (Patient not taking: Reported on 2/6/2024)       Objective     /64 (BP Location: Right arm, Patient Position: Sitting, Cuff Size: Standard)   Pulse (!) 54   Temp 98.5 °F (36.9 °C)   Resp 20   Wt 73.8 kg (162 lb 12.8 oz)   SpO2 96%   BMI 29.78 kg/m²     Physical Exam  Constitutional:       General: She is not in acute distress.     Appearance: She is normal weight. She is not ill-appearing.   HENT:      Mouth/Throat:      Mouth: Mucous membranes are moist.      Pharynx: Oropharynx is clear.   Cardiovascular:      Rate and Rhythm: Normal rate and regular rhythm.      Heart sounds: No murmur heard.  Pulmonary:      Effort: Pulmonary effort is normal. No respiratory distress.      Breath sounds: Normal breath sounds.   Abdominal:      General: Abdomen is flat. There is no distension.      Palpations: Abdomen is soft.      Tenderness: There is no abdominal tenderness. There is no guarding or rebound.   Skin:      General: Skin is warm and dry.   Neurological:      Mental Status: She is alert and oriented to person, place, and time. Mental status is at baseline.       Hansel Vu, DO

## 2024-02-06 NOTE — ASSESSMENT & PLAN NOTE
ED visit on 2/4 for nausea and vomiting, was provided zofran which is helping  Saw GI on 1/31 who requested she use daily miralax, EGD planned later this month  Possibly viral in etiology or worsening of gastroparesis (confirmed on emptying study in 2020)  Could consider reglan if no improvement  Asked her to call or visit the ED if she is unable to eat, stops having BM, or if her belly becomes distended/hard/painful

## 2024-02-08 ENCOUNTER — TELEPHONE (OUTPATIENT)
Dept: INTERNAL MEDICINE CLINIC | Facility: CLINIC | Age: 74
End: 2024-02-08

## 2024-02-08 ENCOUNTER — OFFICE VISIT (OUTPATIENT)
Dept: CARDIOLOGY CLINIC | Facility: CLINIC | Age: 74
End: 2024-02-08
Payer: MEDICARE

## 2024-02-08 VITALS
DIASTOLIC BLOOD PRESSURE: 68 MMHG | BODY MASS INDEX: 30 KG/M2 | OXYGEN SATURATION: 97 % | HEIGHT: 62 IN | WEIGHT: 163 LBS | SYSTOLIC BLOOD PRESSURE: 112 MMHG | HEART RATE: 57 BPM

## 2024-02-08 DIAGNOSIS — I63.9 CEREBROVASCULAR ACCIDENT (CVA), UNSPECIFIED MECHANISM (HCC): ICD-10-CM

## 2024-02-08 DIAGNOSIS — R10.13 EPIGASTRIC PAIN: Primary | ICD-10-CM

## 2024-02-08 DIAGNOSIS — E78.2 MIXED HYPERLIPIDEMIA: ICD-10-CM

## 2024-02-08 DIAGNOSIS — Z86.73 HISTORY OF CEREBROVASCULAR ACCIDENT (CVA) DUE TO ISCHEMIA: Primary | Chronic | ICD-10-CM

## 2024-02-08 DIAGNOSIS — I10 PRIMARY HYPERTENSION: ICD-10-CM

## 2024-02-08 PROCEDURE — 99214 OFFICE O/P EST MOD 30 MIN: CPT | Performed by: INTERNAL MEDICINE

## 2024-02-08 RX ORDER — SUCRALFATE 1 G/1
1 TABLET ORAL 4 TIMES DAILY
Qty: 40 TABLET | Refills: 0 | Status: SHIPPED | OUTPATIENT
Start: 2024-02-08 | End: 2024-02-09 | Stop reason: SDUPTHER

## 2024-02-08 NOTE — PROGRESS NOTES
Cardiology Consultation     Trina Davis  [id number]  1950  [facility]  [address] [facility] [address]    1. History of cerebrovascular accident (CVA) due to ischemia        2. Cerebrovascular accident (CVA), unspecified mechanism ([facility])  Ambulatory referral to Cardiology      3. Mixed hyperlipidemia        4. Primary hypertension          Chief Complaint   Patient presents with    Follow-up     3 month follow-up from NP HFU for hypertensive urgency - Zio performed in interval    Nausea     New symptoms this week, patient has been vomiting and nauseous, when she eats her stomach hurts - was on sucralfate but has run out - she also gets nausea from mucus she swallows due to congestion    Dizziness     Comes on while patient is in bed     HPI: Patient was recently hospitalized in November 2023 due to right putamen infarct.  She is status post TNK.  Patient feels well, without complaints.  No reported chest pain, shortness of breath, palpitations, lightheadedness, syncope, LE edema, orthopnea, PND, or significant weight changes.  Patient remains active without any increased fatigue out of the ordinary.      Patient Active Problem List   Diagnosis    HLD (hyperlipidemia)    SHAYAN (obstructive sleep apnea)    History of recurrent UTIs    CKD (chronic kidney disease) stage 4, GFR 15-29 ml/min ([facility])    Acquired hypothyroidism    RLS (restless legs syndrome)    Vitamin D deficiency    Fibromyalgia    Type 2 diabetes mellitus with stage 4 chronic kidney disease, with long-term current use of insulin ([facility])    Gastroesophageal reflux disease without esophagitis    Primary hypertension    Gastroparesis diabeticorum     Epigastric pain    COPD (chronic obstructive pulmonary disease) ([facility])    Chronic constipation    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    Seasonal allergies    Osteoporosis    Anemia     "Palpitations    Varicose veins of both lower extremities with pain    Postural dizziness with presyncope    Mild dementia without behavioral disturbance, psychotic disturbance, mood disturbance, or anxiety (HCC)    Parenchymal renal hypertension    Anemia in stage 4 chronic kidney disease     Secondary hyperparathyroidism of renal origin (HCC)    B12 deficiency    History of cerebrovascular accident (CVA) due to ischemia    Mixed stress and urge urinary incontinence    Slow transit constipation    Chronic renal disease, stage V (HCC)     Past Medical History:   Diagnosis Date    Actinic keratosis     Acute cystitis without hematuria 04/28/2023    Acute cystitis without hematuria 04/28/2023    Acute-on-chronic kidney injury      NIKOS (acute kidney injury) ([facility]) 05/08/2020    Allergic     Allergic rhinitis     Ambulatory dysfunction 10/22/2020    AMS (altered mental status) 07/14/2020    Anesthesia     pt reports \"had to use double lumen endo tube for hiatal hernia repair/so surgeon could get to where he needed to work\"    Arthritis     Aspiration into airway     Michael esophagus 1993    Lot of stress with children    Basal cell carcinoma 2007    left cheek     BCC (basal cell carcinoma) 05/27/2021    Left Nasal tip    Breast discharge 06/19/2023    Breast pain 06/19/2023    Cancer (HCC)     squamous cell cancer on forhead    Cancer (HCC)     basal cell on nose     Cholelithiasis     Chronic kidney disease 2000, 2018    Stones, kidney disease stage 4    Chronic pain disorder     bilat feet and joint pain on occas    Colon polyp     Confusion 10/30/2023    Constipation     COPD (chronic obstructive pulmonary disease) (HCC)     COVID-19 08/2021    recovered at home/did receive monoclonal infusion    COVID-19 virus infection 08/16/2021    Dental bridge present     Depression     Diabetes mellitus (HCC)     Type 2    Diabetic neuropathy (HCC)     Difficulty walking 2017    Disease of thyroid gland     Dyspnea     Elevated " liver enzymes 04/04/2023    Epigastric abdominal pain with severe diabetic gastroparesis, status post EGD/Botox injection, 5/14 05/17/2021    Facial abrasion, initial encounter 03/22/2023    Fall 03/22/2023    Family history of thyroid problem     Fatty liver     Fibromyalgia, primary     Fracture of orbital floor, blow-out, right, closed, with routine healing, subsequent encounter 03/22/2023    GERD (gastroesophageal reflux disease)     Heart burn     Hiatal hernia     History of cholecystectomy 10/27/2023    History of colon polyps 07/30/2021    History of kidney stones 09/29/2020    Hx of recurrent kidney stones    History of kidney stones 09/29/2020    Hx of recurrent kidney stones  Formatting of this note might be different from the original. Hx of recurrent kidney stones  Last Assessment & Plan:  Formatting of this note might be different from the original. We will set her up for follow-up in 3 months with a KUB prior.  She knows to call if she has any kidney stone type pain in the meantime.    History of Nissen fundoplication 10/27/2023    History of pneumonia     History of repair of hiatal hernia 05/03/2020    Hyperlipidemia     Hyperplasia, parathyroid (HCC)     Hypertension     Hypertensive urgency 10/27/2023    Hyponatremia 04/26/2023    Hypotension 04/13/2022    Kidney problem     Kidney stone     Left hip pain 10/05/2023    Leukocytosis 07/14/2020    Memory loss Julu2 2020    Motion sickness     Motion sickness     Multiple closed fractures of ribs of right side 03/22/2023    Nasal bones, closed fracture 03/22/2023    Neck pain     Obesity 1978    Obesity (BMI 30.0-34.9)     Other chest pain 09/13/2021    Other fatigue 09/21/2023    Peripheral neuropathy     Pollen allergies     RLS (restless legs syndrome)     S/P foot surgery 07/20/2022    SCC (squamous cell carcinoma) 05/04/2021    left mid forehead    SCC (squamous cell carcinoma) 2023    chest    Seasonal allergies     Shingles 01 05 23    Sleep  apnea     has inspire    Squamous cell skin cancer 2007    left cheek     Swollen ankles     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, left     great toe fusion    Urinary incontinence     Urinary tract infection 2022    Wears glasses      Social History     Socioeconomic History    Marital status: /Civil Union     Spouse name: Not on file    Number of children: Not on file    Years of education: Not on file    Highest education level: Not on file   Occupational History    Occupation: RETIRED   Tobacco Use    Smoking status: Former     Current packs/day: 0.00     Average packs/day: 2.0 packs/day for 10.0 years (20.0 ttl pk-yrs)     Types: Cigarettes     Start date: 1966     Quit date: 1976     Years since quittin.1     Passive exposure: Past    Smokeless tobacco: Never    Tobacco comments:     Smoked 2 pack a day   Vaping Use    Vaping status: Never Used   Substance and Sexual Activity    Alcohol use: Yes     Comment: 1 or 2 a year    Drug use: No    Sexual activity: Not Currently     Partners: Male     Birth control/protection: Abstinence, Post-menopausal, Female Sterilization     Comment: defer   Other Topics Concern    Not on file   Social History Narrative    ** Merged History Encounter **          Social Determinants of Health     Financial Resource Strain: Low Risk  (11/10/2023)    Received from     Overall Financial Resource Strain (CARDIA)     Difficulty of Paying Living Expenses: Not very hard   Food Insecurity: No Food Insecurity (11/10/2023)    Received from     Hunger Vital Sign     Worried About Running Out of Food in the Last Year: Never true     Ran Out of Food in the Last Year: Never true   Transportation Needs: No Transportation Needs (11/10/2023)    Received from     PRAPARE - Transportation     Lack of Transportation (Medical): No     Lack of Transportation (Non-Medical): No   Physical Activity:  Inactive (11/10/2023)    Received from [facility]    Exercise Vital Sign     Days of Exercise per Week: 0 days     Minutes of Exercise per Session: 0 min   Stress: Stress Concern Present (11/10/2023)    Received from [facility]    [employer] [employer] - Occupational Stress Questionnaire     Feeling of Stress : To some extent   Social Connections: Moderately Integrated (11/10/2023)    Received from [facility]    Social Connection and Isolation Panel [NHANES]     Frequency of Communication with Friends and Family: More than three times a week     Frequency of Social Gatherings with Friends and Family: Twice a week     Attends Bahai Services: More than 4 times per year     Active Member of Clubs or Organizations: No     Attends Club or Organization Meetings: Never     Marital Status:    Intimate Partner Violence: Not At Risk (11/10/2023)    Received from [facility]    Humiliation, Afraid, Rape, and Kick questionnaire     Fear of Current or Ex-Partner: No     Emotionally Abused: No     Physically Abused: No     Sexually Abused: No   Housing Stability: Low Risk  (11/10/2023)    Received from [facility]    Housing Stability Vital Sign     Unable to Pay for Housing in the Last Year: No     Number of Places Lived in the Last Year: 1     Unstable Housing in the Last Year: No      Family History   Problem Relation Age of Onset    Heart disease Mother     Depression Mother     Hypertension Mother     COPD Mother     Hearing loss Mother     Anxiety disorder Mother     Heart disease Father     Lung cancer Father 70        Smoker     Cancer Father         brain    Alcohol abuse Father     Dementia Father     Hypertension Father     Thyroid disease Father     COPD Father     Arthritis Father     Brain cancer Father 74    Hypertension Sister     Diabetes Sister     Heart disease Sister     Thyroid disease Sister      Cancer Sister         Lympoma, lung    Lung cancer Sister 79    Anxiety disorder Sister     Migraines Sister     Hypertension Brother     Diabetes Brother     Cancer Brother         Throat    Dementia Brother     Stroke Brother     Hypertension Brother     Heart disease Brother     Diabetes Brother     Hypertension Brother     No Known Problems Son     No Known Problems Son     Brain cancer Paternal Aunt         unknown age    Diabetes Sister     Hypertension Sister     Diabetes Brother     Breast cancer Neg Hx      Past Surgical History:   Procedure Laterality Date    ABDOMINAL SURGERY  1997,2015,1972    Nissen x2 1972 tubal ligarion    ARTHROSCOPY KNEE      BREAST BIOPSY      stereotactic-benign    BREAST BIOPSY      stereo-benign    BREAST CYST EXCISION Bilateral     benign    BREAST EXCISIONAL BIOPSY      unknown date-benign    BREAST EXCISIONAL BIOPSY      unknown date-benign    BREAST EXCISIONAL BIOPSY      unknown date-benign    BREAST EXCISIONAL BIOPSY      unknown age-benign    CARDIAC CATHETERIZATION      CHOLECYSTECTOMY      COLONOSCOPY      EXAMINATION UNDER ANESTHESIA N/A 06/24/2021    Procedure: EXAM UNDER ANESTHESIA (EUA), DISE;  Surgeon: Gregory Maier MD;  [facility];  Service: ENT    HERNIA REPAIR      HIATAL HERNIA REPAIR      KNEE SURGERY      Torn maniscus lap surg    LIPOSUCTION      LYMPH NODE BIOPSY      MOHS SURGERY  05/20/2021    left mid forehead-Mickey    MOHS SURGERY Left 05/27/2021    Left nasal tip- mickey     OK LIGATION/BIOPSY TEMPORAL ARTERY Left 01/05/2023    Procedure: BIOPSY ARTERY TEMPORAL;  Surgeon: Alec Dennis DO;  [facility];  Service: Vascular    OK OPEN IMPLANTATION CRANIAL NERVE BOOM & PULSE GEN N/A 11/10/2021    Procedure: INSERTION UPPER AIRWAY STIMULATOR, INSPIRE IMPLANT;  Surgeon: Gregory Maier MD;  Location: [facility];  Service: ENT    OK UNLISTED PROCEDURE FOOT/TOES Right 07/19/2022    Procedure: 1st metatarsal phalangeal joint  fusion;  Surgeon: Dede Bonner DPM;  Location: [facility];  Service: Podiatry    REDUCTION MAMMAPLASTY Bilateral 2000    REDUCTION MAMMAPLASTY      SKIN BIOPSY      SKIN CANCER EXCISION  2007    squamous cell carcinoma     SKIN CANCER EXCISION  2007    basal cell carcinoma    SKIN CANCER EXCISION  2023    SCCIS chest    SQUAMOUS CELL CARCINOMA EXCISION      TOE SURGERY Right 08/04/2022    Right Great Toe Fusion    TONSILLECTOMY      TUBAL LIGATION      UPPER GASTROINTESTINAL ENDOSCOPY      US GUIDED BREAST BIOPSY RIGHT COMPLETE Right 07/18/2022       Current Outpatient Medications:     albuterol (Ventolin HFA) 90 mcg/act inhaler, Inhale 2 puffs every 6 (six) hours as needed for wheezing, Disp: 54 g, Rfl: 0    amLODIPine (NORVASC) 5 mg tablet, Take 1 tablet (5 mg total) by mouth daily, Disp: 90 tablet, Rfl: 1    aspirin (ECOTRIN LOW STRENGTH) 81 mg EC tablet, Take 1 tablet (81 mg total) by mouth daily Do not start before October 31, 2023., Disp: , Rfl: 0    B-D ULTRAFINE III SHORT PEN 31G X 8 MM MISC, , Disp: , Rfl: 3    Calcium Citrate 1040 MG TABS, Take 500 mg by mouth Three times a day, Disp: , Rfl:     Coenzyme Q10 (CoQ10) 100 MG CAPS, Take 100 mg by mouth in the morning Bed time, Disp: , Rfl:     famotidine (PEPCID) 10 mg tablet, Take 1 tablet (10 mg total) by mouth daily Do not start before September 13, 2023., Disp: 30 tablet, Rfl: 0    hydrALAZINE (APRESOLINE) 25 mg tablet, TAKE 1 TABLET BY MOUTH TWICE A DAY (Patient taking differently: Take 25 mg by mouth 2 (two) times a day Taking for weight over 170 pounds), Disp: 180 tablet, Rfl: 2    Icosapent Ethyl 1 g CAPS, Take 2 capsules (2 g total) by mouth 2 (two) times a day, Disp: 120 capsule, Rfl: 3    insulin aspart (NovoLOG) 100 units/mL injection, Inject under the skin 3 (three) times a day before meals 18 units, 18 units, 22 units., Disp: , Rfl:     insulin detemir (Levemir FlexTouch) 100 Units/mL injection pen, Inject 50 Units under the skin every  evening , Disp: , Rfl:     Lactobacillus (PROBIOTIC ACIDOPHILUS PO), Take 1 capsule by mouth 2 (two) times a day, Disp: , Rfl:     levothyroxine 137 mcg tablet, Take 137 mcg by mouth daily, Disp: , Rfl:     losartan (COZAAR) 25 mg tablet, Take 1 tablet (25 mg total) by mouth 2 (two) times a day, Disp: 60 tablet, Rfl: 3    methenamine hippurate (HIPREX) 1 g tablet, Take 1 tablet (1 g total) by mouth 2 (two) times a day with meals, Disp: 180 tablet, Rfl: 3    metoprolol tartrate (LOPRESSOR) 50 mg tablet, Take 1 tablet (50 mg total) by mouth every 12 (twelve) hours, Disp: 180 tablet, Rfl: 3    ondansetron (ZOFRAN) 4 mg tablet, Take 1 tablet (4 mg total) by mouth every 6 (six) hours, Disp: 30 tablet, Rfl: 0    pantoprazole (PROTONIX) 40 mg tablet, Take 1 tablet (40 mg total) by mouth 2 (two) times a day, Disp: 180 tablet, Rfl: 1    pramipexole (MIRAPEX) 0.25 mg tablet, Take 1 tablet (0.25 mg total) by mouth daily at bedtime, Disp: 90 tablet, Rfl: 0    Restasis 0.05 % ophthalmic emulsion, Administer 1 drop to both eyes every 12 (twelve) hours, Disp: , Rfl:     rosuvastatin (CRESTOR) 10 MG tablet, Take 5 mg by mouth daily, Disp: , Rfl:     spironolactone (ALDACTONE) 25 mg tablet, TAKE 1 TABLET (25 MG TOTAL) BY MOUTH DAILY., Disp: 90 tablet, Rfl: 2    cholecalciferol (VITAMIN D3) 1,000 units tablet, Take 2 tablets (2,000 Units total) by mouth daily (Patient taking differently: Take 2,000 Units by mouth daily On hold for 8 weeks staring end of January), Disp: 180 tablet, Rfl: 1    ferrous sulfate 324 MG TBEC, TAKE 1 TABLET (324 MG TOTAL) BY MOUTH EVERY OTHER DAY (Patient taking differently: On hold until futher bloodwork), Disp: 45 tablet, Rfl: 0    gabapentin (NEURONTIN) 100 mg capsule, Take 200 mg by mouth daily at bedtime Uses as needed for restless legs, Disp: , Rfl:   Allergies   Allergen Reactions    Ciprofloxacin Hives    Sulfamethoxazole-Trimethoprim Tongue Swelling     Vitals:    02/08/24 1008   BP: 112/68   BP  "Location: Left arm   Patient Position: Sitting   Cuff Size: Standard   Pulse: 57   SpO2: 97%   Weight: 73.9 kg (163 lb)   Height: 5' 2\" (1.575 m)       Labs:  Admission on 02/04/2024, Discharged on 02/04/2024   Component Date Value    WBC 02/04/2024 10.02     RBC 02/04/2024 3.83     Hemoglobin 02/04/2024 11.4 (L)     Hematocrit 02/04/2024 35.8     MCV 02/04/2024 94     MCH 02/04/2024 29.8     MCHC 02/04/2024 31.8     RDW 02/04/2024 14.0     MPV 02/04/2024 12.2     Platelets 02/04/2024 261     nRBC 02/04/2024 0     Neutrophils Relative 02/04/2024 64     Immat GRANS % 02/04/2024 1     Lymphocytes Relative 02/04/2024 26     Monocytes Relative 02/04/2024 6     Eosinophils Relative 02/04/2024 3     Basophils Relative 02/04/2024 0     Neutrophils Absolute 02/04/2024 6.35     Immature Grans Absolute 02/04/2024 0.08     Lymphocytes Absolute 02/04/2024 2.65     Monocytes Absolute 02/04/2024 0.59     Eosinophils Absolute 02/04/2024 0.32     Basophils Absolute 02/04/2024 0.03     Sodium 02/04/2024 139     Potassium 02/04/2024 4.3     Chloride 02/04/2024 110 (H)     CO2 02/04/2024 21     ANION GAP 02/04/2024 8     BUN 02/04/2024 60 (H)     Creatinine 02/04/2024 2.71 (H)     Glucose 02/04/2024 149 (H)     Calcium 02/04/2024 9.4     AST 02/04/2024 11 (L)     ALT 02/04/2024 20     Alkaline Phosphatase 02/04/2024 46     Total Protein 02/04/2024 5.7 (L)     Albumin 02/04/2024 3.5     Total Bilirubin 02/04/2024 0.31     eGFR 02/04/2024 16     Lipase 02/04/2024 15    Lab on 01/31/2024   Component Date Value    Color, UA 01/31/2024 Colorless     Clarity, UA 01/31/2024 Clear     Specific Gravity, UA 01/31/2024 1.008     pH, UA 01/31/2024 6.0     Leukocytes, UA 01/31/2024 Negative     Nitrite, UA 01/31/2024 Negative     Protein, UA 01/31/2024 200 (2+) (A)     Glucose, UA 01/31/2024 Negative     Ketones, UA 01/31/2024 Negative     Urobilinogen, UA 01/31/2024 <2.0     Bilirubin, UA 01/31/2024 Negative     Occult Blood, UA 01/31/2024 " Negative     RBC, UA 01/31/2024 None Seen     WBC, UA 01/31/2024 1-2     Epithelial Cells 01/31/2024 Occasional     Bacteria, UA 01/31/2024 Occasional    Lab on 01/25/2024   Component Date Value    Vit D, 25-Hydroxy 01/25/2024 21.4 (L)     TSH 3RD GENERATON 01/25/2024 6.643 (H)     Vitamin B-12 01/25/2024 2,387 (H)     Sodium 01/25/2024 135     Potassium 01/25/2024 4.4     Chloride 01/25/2024 105     CO2 01/25/2024 24     ANION GAP 01/25/2024 6     BUN 01/25/2024 60 (H)     Creatinine 01/25/2024 3.06 (H)     Glucose, Fasting 01/25/2024 151 (H)     Calcium 01/25/2024 9.5     eGFR 01/25/2024 14     Cholesterol 01/25/2024 113     Triglycerides 01/25/2024 331 (H)     HDL, Direct 01/25/2024 34 (L)     LDL Calculated 01/25/2024 13    Lab on 01/12/2024   Component Date Value    Sodium 01/12/2024 136     Potassium 01/12/2024 4.1     Chloride 01/12/2024 104     CO2 01/12/2024 23     ANION GAP 01/12/2024 9     BUN 01/12/2024 45 (H)     Creatinine 01/12/2024 3.15 (H)     Glucose, Fasting 01/12/2024 179 (H)     Calcium 01/12/2024 8.7     eGFR 01/12/2024 13    Appointment on 12/22/2023   Component Date Value    WBC 12/22/2023 10.51 (H)     RBC 12/22/2023 4.38     Hemoglobin 12/22/2023 13.2     Hematocrit 12/22/2023 40.3     MCV 12/22/2023 92     MCH 12/22/2023 30.1     MCHC 12/22/2023 32.8     RDW 12/22/2023 15.0     Platelets 12/22/2023 343     MPV 12/22/2023 11.8     Ferritin 12/22/2023 13     Iron Saturation 12/22/2023 21     TIBC 12/22/2023 366     Iron 12/22/2023 78     UIBC 12/22/2023 288     Magnesium 12/22/2023 1.9     Phosphorus 12/22/2023 4.6 (H)     Creatinine, Ur 12/22/2023 50.2     Protein Urine Random 12/22/2023 402     Prot/Creat Ratio, Ur 12/22/2023 8.01 (H)     PTH 12/22/2023 88.5 (H)     25-HYDROXY VIT D 12/22/2023 32     25-Hydroxy D2 12/22/2023 22     25-HYDROXY VIT D3 12/22/2023 10    Appointment on 12/12/2023   Component Date Value    Sodium 12/12/2023 138     Potassium 12/12/2023 3.8     Chloride  12/12/2023 103     CO2 12/12/2023 26     ANION GAP 12/12/2023 9     BUN 12/12/2023 28 (H)     Creatinine 12/12/2023 2.78 (H)     Glucose 12/12/2023 152 (H)     Calcium 12/12/2023 8.8     eGFR 12/12/2023 16    Lab on 11/22/2023   Component Date Value    WBC 11/22/2023 10.19 (H)     RBC 11/22/2023 4.05     Hemoglobin 11/22/2023 12.1     Hematocrit 11/22/2023 37.7     MCV 11/22/2023 93     MCH 11/22/2023 29.9     MCHC 11/22/2023 32.1     RDW 11/22/2023 14.6     MPV 11/22/2023 12.0     Platelets 11/22/2023 297     nRBC 11/22/2023 0     Neutrophils Relative 11/22/2023 70     Immat GRANS % 11/22/2023 0     Lymphocytes Relative 11/22/2023 22     Monocytes Relative 11/22/2023 5     Eosinophils Relative 11/22/2023 3     Basophils Relative 11/22/2023 0     Neutrophils Absolute 11/22/2023 7.03     Immature Grans Absolute 11/22/2023 0.03     Lymphocytes Absolute 11/22/2023 2.27     Monocytes Absolute 11/22/2023 0.55     Eosinophils Absolute 11/22/2023 0.27     Basophils Absolute 11/22/2023 0.04     Vit D, 25-Hydroxy 11/22/2023 11.2 (L)     TSH 3RD GENERATON 11/22/2023 15.198 (H)     PTH 11/22/2023 45.4     Phosphorus 11/22/2023 3.4     Cholesterol 11/22/2023 141     Triglycerides 11/22/2023 490 (H)     HDL, Direct 11/22/2023 41 (L)     LDL Calculated 11/22/2023      Magnesium 11/22/2023 1.8 (L)     Sodium 11/22/2023 138     Potassium 11/22/2023 3.9     Chloride 11/22/2023 99     CO2 11/22/2023 31     ANION GAP 11/22/2023 8     BUN 11/22/2023 32 (H)     Creatinine 11/22/2023 2.94 (H)     Glucose, Fasting 11/22/2023 161 (H)     Calcium 11/22/2023 9.0     Corrected Calcium 11/22/2023 9.5     AST 11/22/2023 21     ALT 11/22/2023 24     Alkaline Phosphatase 11/22/2023 55     Total Protein 11/22/2023 6.0 (L)     Albumin 11/22/2023 3.4 (L)     Total Bilirubin 11/22/2023 0.35     eGFR 11/22/2023 15     Iron Saturation 11/22/2023 17     TIBC 11/22/2023 346     Iron 11/22/2023 58     UIBC 11/22/2023 288     Ferritin 11/22/2023 14      Creatinine, Ur 11/22/2023 128.6     Protein Urine Random 11/22/2023 911     Prot/Creat Ratio, Ur 11/22/2023 7.08 (H)     Free T4 11/22/2023 0.57 (L)     LDL Direct 11/22/2023 28    Appointment on 11/14/2023   Component Date Value    ELOY 11/14/2023 Negative     ds DNA Ab 11/14/2023 1     MPO AB 11/14/2023 <0.2     [place]-3 AB 11/14/2023 <0.2     C3 Complement 11/14/2023 144     C4, COMPLEMENT 11/14/2023 32     IGA 11/14/2023 163     Ig [place] Free Light Chain 11/14/2023 63.5 (H)     Ig Lambda Free Light Eli* 11/14/2023 52.7 (H)     Kappa/Lambda FluidC Ratio 11/14/2023 1.20     A/G Ratio 11/14/2023 1.29     Albumin Electrophoresis 11/14/2023 56.4     Albumin CONC 11/14/2023 3.10 (L)     Alpha 1 11/14/2023 5.0     ALPHA 1 CONC 11/14/2023 0.28     Alpha 2 11/14/2023 17.4 (H)     ALPHA 2 CONC 11/14/2023 0.96     Beta-1 11/14/2023 7.5     BETA 1 CONC 11/14/2023 0.41     Beta-2 11/14/2023 6.0     BETA 2 CONC 11/14/2023 0.33     Gamma Globulin 11/14/2023 7.7     GAMMA CONC 11/14/2023 0.42     Total Protein 11/14/2023 5.5 (L)     SPEP Interpretation 11/14/2023 See Comment     PHOSPHOLIPASE A2 RECEPTO* 11/14/2023 <1.8     Hepatitis C Ab 11/14/2023 Non-reactive     Sodium 11/14/2023 142     Potassium 11/14/2023 4.3     Chloride 11/14/2023 107     CO2 11/14/2023 28     ANION GAP 11/14/2023 7     BUN 11/14/2023 39 (H)     Creatinine 11/14/2023 2.80 (H)     Glucose, Fasting 11/14/2023 184 (H)     Calcium 11/14/2023 8.8     eGFR 11/14/2023 16     Color, UA 11/14/2023 Light Yellow     Clarity, UA 11/14/2023 Clear     Specific Gravity, UA 11/14/2023 1.018     pH, UA 11/14/2023 6.5     Leukocytes, UA 11/14/2023 Negative     Nitrite, UA 11/14/2023 Negative     Protein, UA 11/14/2023 600 (3+) (A)     Glucose, UA 11/14/2023 200 (1/5%) (A)     Ketones, UA 11/14/2023 Negative     Urobilinogen, UA 11/14/2023 <2.0     Bilirubin, UA 11/14/2023 Negative     Occult Blood, UA 11/14/2023 Negative     RBC, UA 11/14/2023 None Seen     WBC, UA  11/14/2023 1-2     Epithelial Cells 11/14/2023 Occasional     Bacteria, UA 11/14/2023 None Seen     Immunofixation Interpret* 11/14/2023 See Comment    Admission on 11/07/2023, Discharged on 11/09/2023   Component Date Value    Ventricular Rate 11/07/2023 75     Atrial Rate 11/07/2023 75     VT Interval 11/07/2023 212     QRSD Interval 11/07/2023 82     QT Interval 11/07/2023 408     QTC Interval 11/07/2023 455     P Axis 11/07/2023 55     QRS [place] 11/07/2023 -8     T Wave [place] 11/07/2023 36     WBC 11/07/2023 12.07 (H)     RBC 11/07/2023 4.02     Hemoglobin 11/07/2023 12.0     Hematocrit 11/07/2023 36.1     MCV 11/07/2023 90     MCH 11/07/2023 29.9     MCHC 11/07/2023 33.2     RDW 11/07/2023 15.6 (H)     MPV 11/07/2023 11.5     Platelets 11/07/2023 270     nRBC 11/07/2023 0     Neutrophils Relative 11/07/2023 74     Immat GRANS % 11/07/2023 1     Lymphocytes Relative 11/07/2023 17     Monocytes Relative 11/07/2023 6     Eosinophils Relative 11/07/2023 2     Basophils Relative 11/07/2023 0     Neutrophils Absolute 11/07/2023 9.02 (H)     Immature Grans Absolute 11/07/2023 0.06     Lymphocytes Absolute 11/07/2023 2.02     Monocytes Absolute 11/07/2023 0.75     Eosinophils Absolute 11/07/2023 0.19     Basophils Absolute 11/07/2023 0.03     Sodium 11/07/2023 137     Potassium 11/07/2023 3.6     Chloride 11/07/2023 103     CO2 11/07/2023 28     ANION GAP 11/07/2023 6     BUN 11/07/2023 29 (H)     Creatinine 11/07/2023 2.37 (H)     Glucose 11/07/2023 121     Calcium 11/07/2023 8.8     Corrected Calcium 11/07/2023 9.3     AST 11/07/2023 20     ALT 11/07/2023 27     Alkaline Phosphatase 11/07/2023 55     Total Protein 11/07/2023 6.1 (L)     Albumin 11/07/2023 3.4 (L)     Total Bilirubin 11/07/2023 0.39     eGFR 11/07/2023 19     hs TnI 0hr 11/07/2023 9     Color, UA 11/07/2023 Light Yellow     Clarity, UA 11/07/2023 Clear     Specific Gravity, UA 11/07/2023 1.020     pH, UA 11/07/2023 6.5     Leukocytes, UA 11/07/2023  Trace (A)     Nitrite, UA 11/07/2023 Negative     Protein, UA 11/07/2023 600 (3+) (A)     Glucose, UA 11/07/2023 100 (1/10%) (A)     Ketones, UA 11/07/2023 Negative     Urobilinogen, UA 11/07/2023 <2.0     Bilirubin, UA 11/07/2023 Negative     Occult Blood, UA 11/07/2023 Small (A)     hs TnI 2hr 11/07/2023 6     Delta 2hr hsTnI 11/07/2023 -3     RBC, UA 11/07/2023 1-2     WBC, UA 11/07/2023 10-20 (A)     Epithelial Cells 11/07/2023 Occasional     Bacteria, UA 11/07/2023 None Seen     Urine Culture 11/07/2023 <10,000 cfu/ml     POC Glucose 11/08/2023 214 (H)     POC Glucose 11/08/2023 188 (H)     POC Glucose 11/08/2023 155 (H)     Sodium 11/09/2023 138     Potassium 11/09/2023 3.5     Chloride 11/09/2023 103     CO2 11/09/2023 28     ANION GAP 11/09/2023 7     BUN 11/09/2023 36 (H)     Creatinine 11/09/2023 2.49 (H)     Glucose 11/09/2023 123     Calcium 11/09/2023 8.4     eGFR 11/09/2023 18     WBC 11/09/2023 8.38     RBC 11/09/2023 4.03     Hemoglobin 11/09/2023 11.4 (L)     Hematocrit 11/09/2023 35.8     MCV 11/09/2023 89     MCH 11/09/2023 28.3     MCHC 11/09/2023 31.8     RDW 11/09/2023 15.2 (H)     MPV 11/09/2023 11.3     Platelets 11/09/2023 254     nRBC 11/09/2023 0     Neutrophils Relative 11/09/2023 59     Immat GRANS % 11/09/2023 0     Lymphocytes Relative 11/09/2023 27     Monocytes Relative 11/09/2023 10     Eosinophils Relative 11/09/2023 4     Basophils Relative 11/09/2023 0     Neutrophils Absolute 11/09/2023 4.87     Immature Grans Absolute 11/09/2023 0.03     Lymphocytes Absolute 11/09/2023 2.27     Monocytes Absolute 11/09/2023 0.85     Eosinophils Absolute 11/09/2023 0.33     Basophils Absolute 11/09/2023 0.03     POC Glucose 11/09/2023 126     POC Glucose 11/09/2023 184 (H)    Appointment on 11/06/2023   Component Date Value    Urine Culture 11/06/2023 10,000-19,000 cfu/ml    There may be more visits with results that are not included.     Lab Results   Component Value Date    TRIG 331 (H)  01/25/2024    HDL 34 (L) 01/25/2024    LDLDIRECT 28 11/22/2023     Imaging: No results found.    Review of Systems:  Review of Systems   Constitutional:  Negative for activity change, appetite change, chills, diaphoresis, fatigue and unexpected weight change.   HENT:  Negative for hearing loss, nosebleeds and sore throat.    Eyes:  Negative for photophobia and visual disturbance.   Respiratory:  Negative for cough, chest tightness, shortness of breath and wheezing.    Cardiovascular:  Negative for chest pain, palpitations and leg swelling.   Gastrointestinal:  Negative for abdominal pain, diarrhea, nausea and vomiting.   Endocrine: Negative for polyuria.   Genitourinary:  Negative for dysuria, frequency and hematuria.   Musculoskeletal:  Negative for arthralgias, back pain, gait problem and neck pain.   Skin:  Negative for pallor and rash.   Neurological:  Negative for dizziness, syncope and headaches.   Hematological:  Does not bruise/bleed easily.   Psychiatric/Behavioral:  Negative for behavioral problems and confusion.        Physical Exam:  Physical Exam  Vitals reviewed.   Constitutional:       Appearance: Normal appearance. She is well-developed. She is not ill-appearing or diaphoretic.   HENT:      Head: Normocephalic and atraumatic.      Nose: Nose normal.   Eyes:      General: No scleral icterus.     Extraocular Movements: Extraocular movements intact.      Pupils: Pupils are equal, round, and reactive to light.   Neck:      Vascular: No JVD.   Cardiovascular:      Rate and Rhythm: Normal rate and regular rhythm.      Heart sounds: Normal heart sounds. No murmur heard.     No friction rub. No gallop.   Pulmonary:      Effort: Pulmonary effort is normal. No respiratory distress.      Breath sounds: Normal breath sounds. No wheezing or rales.   Abdominal:      General: Bowel sounds are normal. There is no distension.      Palpations: Abdomen is soft.      Tenderness: There is no abdominal tenderness.  "  Musculoskeletal:         General: No deformity. Normal range of motion.      Cervical back: Normal range of motion and neck supple.      Right lower leg: No edema.      Left lower leg: No edema.   Skin:     General: Skin is warm and dry.      Findings: No rash.   Neurological:      Mental Status: She is alert and oriented to person, place, and time.      Cranial Nerves: No cranial nerve deficit.   Psychiatric:         Mood and Affect: Mood normal.         Behavior: Behavior normal.       Blood pressure 112/68, pulse 57, height 5' 2\" (1.575 m), weight 73.9 kg (163 lb), SpO2 97%.    Discussion/Summary:  Chest pain: Now resolved.  She does have risk factors of age, HTN, HLD, post-menopausal, family history of CAD, former smoker and had a stress test in June 2018. We rechecked stress testing in Sept 2021 with new symptoms, which was normal.  Patient had recurrent chest pain resulting in another nuclear stress test in June 2023 which was also normal for perfusion defects and ejection fraction.  She also had a Zio patch monitor for palpitations with symptoms correlating with isolated ventricular ectopy and no significant arrhythmias during monitor time period.  Higher dose of metoprolol seems to be well-tolerated.  Currently symptom-free.     Pulm HTN: PASP noted to be 31 mmHg in June 2018 - which is not in the range of pulm HTN, so unlikely to be contributing to symptoms.  Continue to follow serially with symptoms - mild elevations likely in the setting of COPD exacerbations.  No significant change.  No significant edema to suggest worsening.     HTN: continued on amlodipine, very well controlled today - increased to 5mg twice daily - is being managed by Dr. Baires from nephrology - now back to daily dosing due to lower BPs.  Continue current regimen for now, remains well controlled.  Higher dose of metoprolol is tolerated well by blood pressure and heart rate along with spironolactone.  No changes made today.     HLD: " continued on zetia and statin with an LDL of 6 in May 2019.  TG are 307 - which will not be helped by the statin or zetia.  We stopped both and started on wellchol instead of Tricor since that hasn't done much - but stopped by her PCP.  Continues on fish oil.  Recheck revealed TG of 215, which is much improved.  This will be continued to be followed by her PCP.  LDL 34 in May 2021, at goal - was unable to be calculated in Oct 2021 due to significantly elevated TG of 450 - recommended to have tighter BS control and watching carbohydrates in diet.  LDL 26  in April 2022 - much improved and at goal.  LDL now 21 with TG of 226 in September 2022, advised to be more cautious with her diet again.  LDL well controlled at 87 in May 2023.  LDL extremely low at 13 in January 2024.  Will reduce Crestor to 5 mg daily since the LDL is very low.    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  Mild dizziness as residual, otherwise no residual effects.  Patient had a repeat Zio monitor in December 2023 to assess for atrial arrhythmias, specifically atrial fibrillation, as a cause for CVA.  Patient had normal sinus rhythm throughout the time of monitoring with normal ectopy.  Continued on aspirin and statin.

## 2024-02-08 NOTE — TELEPHONE ENCOUNTER
Spoke with patient regarding request for Carafate refill. She mentioned she is still experiencing upper and lower abdominal pain with bowel movements. Patient endorsed that when she takes the Carafate she does not have these symptoms. Patient endorses she is tolerate po, taking miralax, passing gas, and having bowel movements. Patient denies fevers, chills, severe abdominal pain, nausea, vomiting, inability to tolerate po, inability to pass gas, hematochezia, rectal pain, abdominal distension, or any other sx at this time. I encouraged her to move up and out of the bed as tolerated, small meals, increasing fiber and water intake. Recommended to continue upcoming EGD in 3 weeks with GI.  Patient understand and agrees with plan, all questions answered.

## 2024-02-09 DIAGNOSIS — R10.13 EPIGASTRIC PAIN: ICD-10-CM

## 2024-02-09 RX ORDER — SUCRALFATE 1 G/1
1 TABLET ORAL 4 TIMES DAILY
Qty: 40 TABLET | Refills: 0 | Status: SHIPPED | OUTPATIENT
Start: 2024-02-09 | End: 2024-02-19

## 2024-02-15 DIAGNOSIS — J44.9 CHRONIC OBSTRUCTIVE PULMONARY DISEASE, UNSPECIFIED COPD TYPE (HCC): Chronic | ICD-10-CM

## 2024-02-15 DIAGNOSIS — J96.01 ACUTE RESPIRATORY FAILURE WITH HYPOXIA (HCC): Primary | ICD-10-CM

## 2024-02-20 ENCOUNTER — TELEPHONE (OUTPATIENT)
Dept: INTERNAL MEDICINE CLINIC | Facility: CLINIC | Age: 74
End: 2024-02-20

## 2024-02-20 DIAGNOSIS — F39 MOOD DISORDER (HCC): Primary | ICD-10-CM

## 2024-02-20 RX ORDER — ESCITALOPRAM OXALATE 5 MG/1
10 TABLET ORAL DAILY
Qty: 90 TABLET | Refills: 3 | Status: SHIPPED | OUTPATIENT
Start: 2024-02-20 | End: 2024-02-20 | Stop reason: CLARIF

## 2024-02-20 RX ORDER — ESCITALOPRAM OXALATE 10 MG/1
10 TABLET ORAL DAILY
Qty: 30 TABLET | Refills: 2 | Status: SHIPPED | OUTPATIENT
Start: 2024-02-20 | End: 2024-05-20

## 2024-02-20 NOTE — TELEPHONE ENCOUNTER
Spoke with patient about current concerns after Cymbalta was discontinue by Geriatrics in the setting of kidney function in geriatric population which was taking for RLS, neuropathic pain and mood disorder.      Patient endorse since stopping medication after been on medication for a while has experience worsening aches an pain. Her mood has worsened with loss of interest, fatigue, sadness tearfull    Discussion about medications options    In regards for Mood disorder agreable to start Lexapro 10mg once a day will not uptitrate medication upward as in geriatric population and this may help in perception for pain. Patient agreable to follow up with office in about 2-4 weeks especially in symptoms regarding not only for depression as well as RLS and neuropathic pain as symptoms worse upon bedtime.     Patient already on Gabapentin 200mg once a day at bedtime. Kidney function currently Cr/Cl 21 can consider increase Gabapetin 300mg once a day at bedtime due to this however will hold off at this time pending tolerance and possible improvement with SSRI

## 2024-02-22 DIAGNOSIS — E78.1 HYPERTRIGLYCERIDEMIA: Primary | ICD-10-CM

## 2024-02-22 RX ORDER — ROSUVASTATIN CALCIUM 5 MG/1
5 TABLET, COATED ORAL DAILY
Qty: 90 TABLET | Refills: 3 | Status: SHIPPED | OUTPATIENT
Start: 2024-02-22

## 2024-02-22 RX ORDER — ROSUVASTATIN CALCIUM 5 MG/1
5 TABLET, COATED ORAL DAILY
COMMUNITY
End: 2024-02-22 | Stop reason: SDUPTHER

## 2024-02-23 NOTE — ASSESSMENT & PLAN NOTE
Lab Results   Component Value Date    HGBA1C 6 8 (H) 03/07/2023       Recent Labs     04/27/23  1608 04/27/23  2115 04/28/23  0741 04/28/23  1054   POCGLU 86 241* 143* 195*       Blood Sugar Average: Last 72 hrs:  · (P) 164  25Hbg a1c <7% per most recent labs   · Continue home insulin regimen: levemir 50U SQ QHS, Novolog 16/16/25U SQ w/ meals  · Add SSI for correction w/ accuchecks 17.2

## 2024-02-29 ENCOUNTER — HOSPITAL ENCOUNTER (OUTPATIENT)
Dept: GASTROENTEROLOGY | Facility: HOSPITAL | Age: 74
Setting detail: OUTPATIENT SURGERY
End: 2024-02-29
Payer: MEDICARE

## 2024-02-29 ENCOUNTER — ANESTHESIA EVENT (OUTPATIENT)
Dept: GASTROENTEROLOGY | Facility: HOSPITAL | Age: 74
End: 2024-02-29

## 2024-02-29 ENCOUNTER — ANESTHESIA (OUTPATIENT)
Dept: GASTROENTEROLOGY | Facility: HOSPITAL | Age: 74
End: 2024-02-29

## 2024-02-29 VITALS
HEIGHT: 63 IN | OXYGEN SATURATION: 97 % | WEIGHT: 160 LBS | HEART RATE: 59 BPM | TEMPERATURE: 96.9 F | BODY MASS INDEX: 28.35 KG/M2 | RESPIRATION RATE: 18 BRPM | DIASTOLIC BLOOD PRESSURE: 68 MMHG | SYSTOLIC BLOOD PRESSURE: 140 MMHG

## 2024-02-29 DIAGNOSIS — R10.13 EPIGASTRIC PAIN: ICD-10-CM

## 2024-02-29 DIAGNOSIS — K31.84 GASTROPARESIS DIABETICORUM: ICD-10-CM

## 2024-02-29 DIAGNOSIS — K21.9 GASTROESOPHAGEAL REFLUX DISEASE WITHOUT ESOPHAGITIS: Chronic | ICD-10-CM

## 2024-02-29 DIAGNOSIS — R63.4 WEIGHT LOSS, UNINTENTIONAL: ICD-10-CM

## 2024-02-29 DIAGNOSIS — E11.43 GASTROPARESIS DIABETICORUM: ICD-10-CM

## 2024-02-29 LAB — GLUCOSE SERPL-MCNC: 151 MG/DL (ref 65–140)

## 2024-02-29 PROCEDURE — 82948 REAGENT STRIP/BLOOD GLUCOSE: CPT

## 2024-02-29 PROCEDURE — 88305 TISSUE EXAM BY PATHOLOGIST: CPT | Performed by: SPECIALIST

## 2024-02-29 RX ORDER — SODIUM CHLORIDE, SODIUM LACTATE, POTASSIUM CHLORIDE, CALCIUM CHLORIDE 600; 310; 30; 20 MG/100ML; MG/100ML; MG/100ML; MG/100ML
INJECTION, SOLUTION INTRAVENOUS CONTINUOUS PRN
Status: DISCONTINUED | OUTPATIENT
Start: 2024-02-29 | End: 2024-02-29

## 2024-02-29 RX ORDER — PROPOFOL 10 MG/ML
INJECTION, EMULSION INTRAVENOUS AS NEEDED
Status: DISCONTINUED | OUTPATIENT
Start: 2024-02-29 | End: 2024-02-29

## 2024-02-29 RX ORDER — LIDOCAINE HYDROCHLORIDE 10 MG/ML
INJECTION, SOLUTION EPIDURAL; INFILTRATION; INTRACAUDAL; PERINEURAL AS NEEDED
Status: DISCONTINUED | OUTPATIENT
Start: 2024-02-29 | End: 2024-02-29

## 2024-02-29 RX ORDER — EPHEDRINE SULFATE 50 MG/ML
INJECTION INTRAVENOUS AS NEEDED
Status: DISCONTINUED | OUTPATIENT
Start: 2024-02-29 | End: 2024-02-29

## 2024-02-29 RX ADMIN — PROPOFOL 50 MG: 10 INJECTION, EMULSION INTRAVENOUS at 10:52

## 2024-02-29 RX ADMIN — SODIUM CHLORIDE, SODIUM LACTATE, POTASSIUM CHLORIDE, AND CALCIUM CHLORIDE: .6; .31; .03; .02 INJECTION, SOLUTION INTRAVENOUS at 10:07

## 2024-02-29 RX ADMIN — LIDOCAINE HYDROCHLORIDE 100 MG: 10 INJECTION, SOLUTION EPIDURAL; INFILTRATION; INTRACAUDAL; PERINEURAL at 10:45

## 2024-02-29 RX ADMIN — EPHEDRINE SULFATE 5 MG: 50 INJECTION INTRAVENOUS at 10:49

## 2024-02-29 RX ADMIN — PROPOFOL 100 MG: 10 INJECTION, EMULSION INTRAVENOUS at 10:45

## 2024-02-29 RX ADMIN — EPHEDRINE SULFATE 5 MG: 50 INJECTION INTRAVENOUS at 10:46

## 2024-02-29 RX ADMIN — PROPOFOL 50 MG: 10 INJECTION, EMULSION INTRAVENOUS at 10:48

## 2024-02-29 NOTE — INTERVAL H&P NOTE
H&P reviewed. After examining the patient I find no changes in the patients condition since the H&P had been written.    Vitals:    02/29/24 0938   BP: 147/66   Pulse: (!) 54   Resp: 20   Temp: 97.6 °F (36.4 °C)   SpO2: 99%     Informed consent was obtained for the procedure.  Risks of infection, perforation and hemorrhage were discussed. The patient was agreeable to proceed with the procedure.

## 2024-02-29 NOTE — DISCHARGE INSTRUCTIONS
Upper Endoscopy   WHAT YOU NEED TO KNOW:   An upper endoscopy is also called an upper gastrointestinal (GI) endoscopy, or an esophagogastroduodenoscopy (EGD). You may feel bloated, gassy, or have some abdominal discomfort after your procedure. Your throat may be sore for 24 to 36 hours. You may burp or pass gas from air that is still inside your body.        DISCHARGE INSTRUCTIONS:      Seek care immediately if:   You feel dizzy or faint.    You have sudden chest pain or trouble breathing.     You have a large amount of bright red blood in your bowel movements.     Your abdomen is hard and firm and you have severe pain.     You vomit blood.     Contact your healthcare provider if:   You feel full or bloated and cannot burp or pass gas.     You have not had a bowel movement for 3 days after your procedure.     You have neck pain.     You have a fever or chills.     You have nausea or are vomiting.     You have a rash or hives.     You have questions or concerns about your endoscopy.     Relieve a sore throat:  Suck on throat lozenges or crushed ice. Gargle with a small amount of warm salt water. Mix 1 teaspoon of salt and 1 cup of warm water to make salt water.     Relieve gas and discomfort from bloating:  Lie on your right side with a heating pad on your abdomen. Take short walks to help pass gas. Eat small meals until bloating is relieved.    Rest after your procedure:  Do not drive or make important decisions until the day after your procedure. Return to your normal activity as directed. You can usually return to work the day after your procedure.    Follow up with your healthcare provider as directed:  Write down your questions so you remember to ask them during your visits.

## 2024-02-29 NOTE — ANESTHESIA PREPROCEDURE EVALUATION
Procedure:  EGD    Relevant Problems   CARDIO   (+) HLD (hyperlipidemia)   (+) Parenchymal renal hypertension   (+) Primary hypertension      ENDO   (+) Acquired hypothyroidism   (+) Secondary hyperparathyroidism of renal origin (HCC)   (+) Type 2 diabetes mellitus with stage 4 chronic kidney disease, with long-term current use of insulin (HCC)      GI/HEPATIC   (+) Gastroesophageal reflux disease without esophagitis      /RENAL   (+) CKD (chronic kidney disease) stage 4, GFR 15-29 ml/min (HCC)   (+) Chronic renal disease, stage V (HCC)   (+) Parenchymal renal hypertension      HEMATOLOGY   (+) Anemia   (+) Anemia in stage 4 chronic kidney disease       MUSCULOSKELETAL   (+) Fibromyalgia      NEURO/PSYCH   (+) Fibromyalgia   (+) Gastroparesis diabeticorum    (+) Mild dementia without behavioral disturbance, psychotic disturbance, mood disturbance, or anxiety (HCC)      PULMONARY   (+) Acute respiratory failure with hypoxia (HCC)   (+) COPD (chronic obstructive pulmonary disease) (HCC)   (+) Chronic obstructive pulmonary disease, unspecified COPD type ([facility])   (+) SHAYAN (obstructive sleep apnea)      Other   (+) History of cerebrovascular accident (CVA) due to ischemia      TTE 10/2023  LVEF 55%, G1DD, mild MR, mild TR    Lab Results   Component Value Date    WBC 10.02 02/04/2024    HGB 11.4 (L) 02/04/2024    HCT 35.8 02/04/2024    MCV 94 02/04/2024     02/04/2024     Lab Results   Component Value Date    K 4.3 02/04/2024    CO2 21 02/04/2024     (H) 02/04/2024    BUN 60 (H) 02/04/2024    CREATININE 2.71 (H) 02/04/2024     Lab Results   Component Value Date    INR 0.84 10/27/2023    INR 0.85 09/12/2023    INR 0.87 08/16/2023    PROTIME 12.1 10/27/2023    PROTIME 12.2 09/12/2023    PROTIME 12.4 08/16/2023     Lab Results   Component Value Date    PTT 27 10/27/2023       Lab Results   Component Value Date    GLUCOSE 168 (H) 11/30/2018       Lab Results   Component Value Date    HGBA1C 7.1 (H) 10/28/2023        Type and Screen:  A      Physical Exam    Airway    Mallampati score: II  TM Distance: >3 FB  Neck ROM: full     Dental       Cardiovascular      Pulmonary      Other Findings  post-pubertal.      Anesthesia Plan  ASA Score- 3     Anesthesia Type- IV sedation with anesthesia with ASA Monitors.         Additional Monitors:     Airway Plan:            Plan Factors-Exercise tolerance (METS): >4 METS.    Chart reviewed. EKG reviewed. Imaging results reviewed. Existing labs reviewed. Patient summary reviewed.                  Induction- intravenous.    Postoperative Plan-     Informed Consent- Anesthetic plan and risks discussed with patient.  I personally reviewed this patient with the CRNA. Discussed and agreed on the Anesthesia Plan with the CRNA..

## 2024-02-29 NOTE — ANESTHESIA POSTPROCEDURE EVALUATION
Post-Op Assessment Note    CV Status:  Stable  Pain Score: 0    Pain management: adequate       Mental Status:  Sleepy   Hydration Status:  Euvolemic   PONV Controlled:  Controlled   Airway Patency:  Patent     Post Op Vitals Reviewed: Yes    No anethesia notable event occurred.    Staff: CRNA, Anesthesiologist               /59 (02/29/24 1100)    Temp (!) 96.9 °F (36.1 °C) (02/29/24 1100)    Pulse 59 (02/29/24 1100)   Resp 18 (02/29/24 1100)    SpO2 99 % (02/29/24 1100)

## 2024-03-04 DIAGNOSIS — E53.8 B12 DEFICIENCY: Primary | Chronic | ICD-10-CM

## 2024-03-04 PROCEDURE — 88305 TISSUE EXAM BY PATHOLOGIST: CPT | Performed by: SPECIALIST

## 2024-03-05 LAB — VIT B12 SERPL-MCNC: 790 PG/ML (ref 180–914)

## 2024-03-11 ENCOUNTER — OFFICE VISIT (OUTPATIENT)
Dept: INTERNAL MEDICINE CLINIC | Facility: CLINIC | Age: 74
End: 2024-03-11
Payer: MEDICARE

## 2024-03-11 VITALS
BODY MASS INDEX: 27.49 KG/M2 | SYSTOLIC BLOOD PRESSURE: 102 MMHG | WEIGHT: 161 LBS | DIASTOLIC BLOOD PRESSURE: 60 MMHG | HEIGHT: 64 IN

## 2024-03-11 DIAGNOSIS — N25.81 SECONDARY HYPERPARATHYROIDISM OF RENAL ORIGIN (HCC): ICD-10-CM

## 2024-03-11 DIAGNOSIS — Z86.73 HISTORY OF CEREBROVASCULAR ACCIDENT (CVA) DUE TO ISCHEMIA: Chronic | ICD-10-CM

## 2024-03-11 DIAGNOSIS — E83.52 HYPERCALCEMIA: ICD-10-CM

## 2024-03-11 DIAGNOSIS — F03.A0 MILD DEMENTIA WITHOUT BEHAVIORAL DISTURBANCE, PSYCHOTIC DISTURBANCE, MOOD DISTURBANCE, OR ANXIETY, UNSPECIFIED DEMENTIA TYPE (HCC): Chronic | ICD-10-CM

## 2024-03-11 DIAGNOSIS — N18.4 TYPE 2 DIABETES MELLITUS WITH STAGE 4 CHRONIC KIDNEY DISEASE, WITH LONG-TERM CURRENT USE OF INSULIN (HCC): Chronic | ICD-10-CM

## 2024-03-11 DIAGNOSIS — S02.31XA FRACTURE OF ORBITAL FLOOR, RIGHT SIDE, INITIAL ENCOUNTER FOR CLOSED FRACTURE (HCC): ICD-10-CM

## 2024-03-11 DIAGNOSIS — E11.22 TYPE 2 DIABETES MELLITUS WITH STAGE 4 CHRONIC KIDNEY DISEASE, WITH LONG-TERM CURRENT USE OF INSULIN (HCC): Chronic | ICD-10-CM

## 2024-03-11 DIAGNOSIS — R80.1 PERSISTENT PROTEINURIA: ICD-10-CM

## 2024-03-11 DIAGNOSIS — I12.9 PARENCHYMAL RENAL HYPERTENSION, STAGE 1 THROUGH STAGE 4 OR UNSPECIFIED CHRONIC KIDNEY DISEASE: ICD-10-CM

## 2024-03-11 DIAGNOSIS — N18.4 STAGE 4 CHRONIC KIDNEY DISEASE (HCC): ICD-10-CM

## 2024-03-11 DIAGNOSIS — I10 ESSENTIAL HYPERTENSION: Primary | ICD-10-CM

## 2024-03-11 DIAGNOSIS — Z79.4 TYPE 2 DIABETES MELLITUS WITH STAGE 4 CHRONIC KIDNEY DISEASE, WITH LONG-TERM CURRENT USE OF INSULIN (HCC): Chronic | ICD-10-CM

## 2024-03-11 DIAGNOSIS — E03.9 ACQUIRED HYPOTHYROIDISM: Chronic | ICD-10-CM

## 2024-03-11 DIAGNOSIS — K21.9 GASTROESOPHAGEAL REFLUX DISEASE WITHOUT ESOPHAGITIS: Chronic | ICD-10-CM

## 2024-03-11 DIAGNOSIS — N18.4 CKD (CHRONIC KIDNEY DISEASE) STAGE 4, GFR 15-29 ML/MIN (HCC): ICD-10-CM

## 2024-03-11 DIAGNOSIS — E55.9 VITAMIN D DEFICIENCY: ICD-10-CM

## 2024-03-11 PROCEDURE — 99215 OFFICE O/P EST HI 40 MIN: CPT | Performed by: INTERNAL MEDICINE

## 2024-03-11 PROCEDURE — G2211 COMPLEX E/M VISIT ADD ON: HCPCS | Performed by: INTERNAL MEDICINE

## 2024-03-11 RX ORDER — MELATONIN
2000 DAILY
Qty: 180 TABLET | Refills: 1 | Status: SHIPPED | OUTPATIENT
Start: 2024-03-11

## 2024-03-11 RX ORDER — LEVOTHYROXINE SODIUM 0.12 MG/1
137 TABLET ORAL DAILY
Qty: 30 TABLET | Refills: 2 | Status: SHIPPED | OUTPATIENT
Start: 2024-03-11 | End: 2024-06-09

## 2024-03-11 RX ORDER — HYDRALAZINE HYDROCHLORIDE 25 MG/1
25 TABLET, FILM COATED ORAL EVERY EVENING
Qty: 30 TABLET | Refills: 2 | Status: SHIPPED | OUTPATIENT
Start: 2024-03-11

## 2024-03-11 RX ORDER — LOSARTAN POTASSIUM 25 MG/1
25 TABLET ORAL DAILY
Qty: 60 TABLET | Refills: 3 | Status: SHIPPED | OUTPATIENT
Start: 2024-03-11

## 2024-03-11 RX ORDER — CALCIUM CITRATE 1040MG
500 TABLET ORAL 2 TIMES DAILY
Qty: 60 TABLET | Refills: 2 | Status: SHIPPED | OUTPATIENT
Start: 2024-03-11

## 2024-03-11 NOTE — PATIENT INSTRUCTIONS
Reduce losartan 25 mg once a day in the morning instead of twice daily.  Reduce levothyroxine to 125 mcg daily and repeat TSH in early May (After May 6th).  Reduce hydralazine 25 mg once daily in the evening instead of twice daily.  Decrease calcium citrate 500 mg to twice a day from 3x a day.        For breakfast use sliding scale below for short acting insulin.  Continue levemir 50u at bedtime.  Continue Novolog 18u before lunch and dinner.    BG <150 - 0 units   - 200 - 3 units   - 250 - 5 units  - 300 - 8 units   - 350 - 10 units  - 400 - 12 units

## 2024-03-12 PROBLEM — I10 PRIMARY HYPERTENSION: Status: RESOLVED | Noted: 2020-09-23 | Resolved: 2024-03-12

## 2024-03-12 PROBLEM — S02.31XA FRACTURE OF ORBITAL FLOOR, RIGHT SIDE, INITIAL ENCOUNTER FOR CLOSED FRACTURE (HCC): Status: ACTIVE | Noted: 2023-03-22

## 2024-03-12 PROBLEM — D64.9 ANEMIA: Status: RESOLVED | Noted: 2023-06-28 | Resolved: 2024-03-12

## 2024-03-12 PROBLEM — N18.5 CHRONIC RENAL DISEASE, STAGE V (HCC): Status: RESOLVED | Noted: 2024-01-18 | Resolved: 2024-03-12

## 2024-03-12 PROBLEM — J44.9 CHRONIC OBSTRUCTIVE PULMONARY DISEASE, UNSPECIFIED COPD TYPE (HCC): Status: RESOLVED | Noted: 2024-02-15 | Resolved: 2024-03-12

## 2024-03-12 PROBLEM — J96.01 ACUTE RESPIRATORY FAILURE WITH HYPOXIA (HCC): Status: RESOLVED | Noted: 2024-02-15 | Resolved: 2024-03-12

## 2024-03-12 PROBLEM — K59.09 CHRONIC CONSTIPATION: Status: RESOLVED | Noted: 2021-07-30 | Resolved: 2024-03-12

## 2024-03-12 NOTE — ASSESSMENT & PLAN NOTE
Lab Results   Component Value Date    EGFR 17 (L) 03/05/2024    EGFR 16 02/04/2024    EGFR 14 01/25/2024    CREATININE 2.85 (H) 03/05/2024    CREATININE 2.71 (H) 02/04/2024    CREATININE 3.06 (H) 01/25/2024   Renal fxn is stable  Continue close follow-up with Nephrology.

## 2024-03-12 NOTE — ASSESSMENT & PLAN NOTE
Lab Results   Component Value Date    HGBA1C 6.9 (H) 03/05/2024     Patient not injecting 18u before breakfast as she generally does not eat much of a breakfast, she sometimes holds all insulin before breakfast as BG is sometimes around 80 and generally always less than 120.    Continue 18 u with lunch and dinner  Continue levemir 50u every evening  Given that she does not eat full meal for breakfast and BG is running on the lower side in the AM, will have her do sliding scale just with breakfast instead of standing insulin order.  Patient to follow ISS before breakfast as below:  BG <150 - 0 units   - 200 - 3 units   - 250 - 5 units  - 300 - 8 units   - 350 - 10 units  - 400 - 12 units

## 2024-03-12 NOTE — ASSESSMENT & PLAN NOTE
TSH suppressed.  This may be due to the fact that patient now takes her levothyroxine by itself without rest of her medications and may be now overtreated as there may have been some competition for absorption of levothyroxine historically requiring a higher dose.  Decrease levothyroxine to 125 mcg.  Repeat TSH in 6-8 weeks.

## 2024-03-12 NOTE — ASSESSMENT & PLAN NOTE
Decrease calcium citrate supplementation 500 mg to twice daily, Ca++ is a little high on most recent blood work.  PTH ~88 in December, does have secondary hyperparathyroidism from CKD though this is not consistent with this disease.  Over treatment with calcium?  25-hydroxy vit D levels are not high, so do not suspect vit D toxicity.  R/o PTH-related protein secreting tumor, check PTH-rp levels

## 2024-03-12 NOTE — ASSESSMENT & PLAN NOTE
MRI in October 2023 noted subacute infarct involving R putamen  ASA and statin/Lovaza therapies. LDL is at goal <70.  Trigs are improved.  Additional Trig lowering therapies are contraindicated in the setting of advanced CKD.

## 2024-03-12 NOTE — PROGRESS NOTES
Name: Trina Davis      : 1950      MRN: [id number]  Encounter Provider: Tree Aguilar MD  Encounter Date: 3/11/2024   Encounter department: [facility] INTERNAL MEDICINE [place]    Assessment & Plan     1. Essential hypertension  -     losartan (COZAAR) 25 mg tablet; Take 1 tablet (25 mg total) by mouth daily  -     hydrALAZINE (APRESOLINE) 25 mg tablet; Take 1 tablet (25 mg total) by mouth every evening    2. Persistent proteinuria  -     losartan (COZAAR) 25 mg tablet; Take 1 tablet (25 mg total) by mouth daily    3. Stage 4 chronic kidney disease (HCC)  -     losartan (COZAAR) 25 mg tablet; Take 1 tablet (25 mg total) by mouth daily    4. Parenchymal renal hypertension, stage 1 through stage 4 or unspecified chronic kidney disease  Assessment & Plan:  Decrease Losartan to 25 mg once daily in AM  Decrease hydralazine to 25 mg every evening    Orders:  -     losartan (COZAAR) 25 mg tablet; Take 1 tablet (25 mg total) by mouth daily  -     hydrALAZINE (APRESOLINE) 25 mg tablet; Take 1 tablet (25 mg total) by mouth every evening    5. Acquired hypothyroidism  Assessment & Plan:  TSH suppressed.  This may be due to the fact that patient now takes her levothyroxine by itself without rest of her medications and may be now overtreated as there may have been some competition for absorption of levothyroxine historically requiring a higher dose.  Decrease levothyroxine to 125 mcg.  Repeat TSH in 6-8 weeks.    Orders:  -     levothyroxine 125 mcg tablet; Take 1 tablet (125 mcg total) by mouth daily  -     TSH, 3rd generation; Future; Expected date: 2024    6. Secondary hyperparathyroidism of renal origin (HCC)  -     hydrALAZINE (APRESOLINE) 25 mg tablet; Take 1 tablet (25 mg total) by mouth every evening    7. Vitamin D deficiency  Assessment & Plan:  Decrease calcium citrate supplementation 500 mg to twice daily, Ca++ is a little high on most recent blood work.  PTH ~88 in December, does have secondary  hyperparathyroidism from CKD though this is not consistent with this disease.  Over treatment with calcium?  25-hydroxy vit D levels are not high, so do not suspect vit D toxicity.  R/o PTH-related protein secreting tumor, check PTH-rp levels    Orders:  -     cholecalciferol (VITAMIN D3) 1,000 units tablet; Take 2 tablets (2,000 Units total) by mouth daily  -     Calcium Citrate 1040 MG TABS; Take 0.4808 tablets (500 mg total) by mouth 2 (two) times a day    8. Hypercalcemia  -     PTH-related peptide; Future    9. Mild dementia without behavioral disturbance, psychotic disturbance, mood disturbance, or anxiety, unspecified dementia type ([facility])  Assessment & Plan:  Following with Geriatrics for this.  Seems to be doing better from cognitive standpoint.      10. Type 2 diabetes mellitus with stage 4 chronic kidney disease, with long-term current use of insulin ([facility])  Assessment & Plan:    Lab Results   Component Value Date    HGBA1C 6.9 (H) 03/05/2024     Patient not injecting 18u before breakfast as she generally does not eat much of a breakfast, she sometimes holds all insulin before breakfast as BG is sometimes around 80 and generally always less than 120.    Continue 18 u with lunch and dinner  Continue levemir 50u every evening  Given that she does not eat full meal for breakfast and BG is running on the lower side in the AM, will have her do sliding scale just with breakfast instead of standing insulin order.  Patient to follow ISS before breakfast as below:  BG <150 - 0 units   - 200 - 3 units   - 250 - 5 units  - 300 - 8 units   - 350 - 10 units  - 400 - 12 units      11. Gastroesophageal reflux disease without esophagitis  Assessment & Plan:  Patient does not feel that her Sx are well controlled enough to decrease PPI to once daily dosing.  Continue PPI BID  Continue famotidine qHS  Discussed adverse kidney and GI health with long-term PPI use      12. Fracture of orbital floor,  right side, initial encounter for closed fracture ([facility])  Assessment & Plan:  Well healed.       13. History of cerebrovascular accident (CVA) due to ischemia  Assessment & Plan:  MRI in October 2023 noted subacute infarct involving R putamen  ASA and statin/Lovaza therapies. LDL is at goal <70.  Trigs are improved.  Additional Trig lowering therapies are contraindicated in the setting of advanced CKD.      14. CKD (chronic kidney disease) stage 4, GFR 15-29 ml/min ([facility])  Assessment & Plan:  Lab Results   Component Value Date    EGFR 17 (L) 03/05/2024    EGFR 16 02/04/2024    EGFR 14 01/25/2024    CREATININE 2.85 (H) 03/05/2024    CREATININE 2.71 (H) 02/04/2024    CREATININE 3.06 (H) 01/25/2024   Renal fxn is stable  Continue close follow-up with Nephrology.          Falls Plan of Care: balance, strength, and gait training instructions were provided.     Talk with Geriatrics regarding driving.     Subjective      Patient here for follow-up regarding chronic conditions.  She is accompanied by her .  They are welcoming a grandchild soon and son is getting .    Medically complex with multiple medical co morbidities. She follows with Cardiology, Nephrology, Geriatrics, Endocrinology, Gastroenterology, and Urology for co-management of her conditions.    Compliant with medications.      Patient notes improved cognition since she was last seen here.  She is following with Geriatrics.  She is not driving since diagnosed with dementia though she does express interest in wanting to drive locally again.  She has been having worsening GERD symptoms lately and underwent EGD with GI at the end of February which noted only a polyp in the stomach which was removed with cold snare. She was having some looser stools though this is improving.       Review of Systems   Constitutional:  Negative for fever.   Respiratory:  Negative for shortness of breath.    Cardiovascular:  Negative for chest pain.   Gastrointestinal:   Positive for abdominal pain and diarrhea. Negative for constipation, nausea and vomiting.   Musculoskeletal:  Positive for arthralgias.       Current Outpatient Medications on File Prior to Visit   Medication Sig   • albuterol (Ventolin HFA) 90 mcg/act inhaler Inhale 2 puffs every 6 (six) hours as needed for wheezing   • amLODIPine (NORVASC) 5 mg tablet Take 1 tablet (5 mg total) by mouth daily   • aspirin (ECOTRIN LOW STRENGTH) 81 mg EC tablet Take 1 tablet (81 mg total) by mouth daily Do not start before October 31, 2023.   • B-D ULTRAFINE III SHORT PEN 31G X 8 MM MISC    • Coenzyme Q10 (CoQ10) 100 MG CAPS Take 100 mg by mouth in the morning Bed time   • escitalopram (Lexapro) 10 mg tablet Take 1 tablet (10 mg total) by mouth daily   • famotidine (PEPCID) 10 mg tablet Take 1 tablet (10 mg total) by mouth daily Do not start before September 13, 2023.   • Icosapent Ethyl 1 g CAPS Take 2 capsules (2 g total) by mouth 2 (two) times a day   • insulin aspart (NovoLOG) 100 units/mL injection Inject under the skin 3 (three) times a day before meals 18 units, 18 units, 22 units.   • insulin detemir (Levemir FlexTouch) 100 Units/mL injection pen Inject 50 Units under the skin every evening    • Lactobacillus (PROBIOTIC ACIDOPHILUS PO) Take 1 capsule by mouth 2 (two) times a day   • methenamine hippurate (HIPREX) 1 g tablet Take 1 tablet (1 g total) by mouth 2 (two) times a day with meals   • metoprolol tartrate (LOPRESSOR) 50 mg tablet Take 1 tablet (50 mg total) by mouth every 12 (twelve) hours   • pantoprazole (PROTONIX) 40 mg tablet Take 1 tablet (40 mg total) by mouth 2 (two) times a day   • pramipexole (MIRAPEX) 0.25 mg tablet Take 1 tablet (0.25 mg total) by mouth daily at bedtime   • Restasis 0.05 % ophthalmic emulsion Administer 1 drop to both eyes every 12 (twelve) hours   • rosuvastatin (CRESTOR) 5 mg tablet Take 1 tablet (5 mg total) by mouth daily   • spironolactone (ALDACTONE) 25 mg tablet TAKE 1 TABLET (25 MG  "TOTAL) BY MOUTH DAILY.       Objective     /60 (BP Location: Left arm, Patient Position: Sitting, Cuff Size: Adult)   Ht 5' 3.5\" (1.613 m)   Wt 73 kg (161 lb)   BMI 28.07 kg/m²     Physical Exam  Constitutional:       General: She is not in acute distress.     Appearance: She is obese. She is not ill-appearing or toxic-appearing.   Cardiovascular:      Rate and Rhythm: Normal rate and regular rhythm.   Pulmonary:      Effort: No respiratory distress.      Breath sounds: No wheezing.   Abdominal:      General: There is no distension.      Palpations: There is no mass.      Tenderness: There is no abdominal tenderness. There is no guarding.   Skin:     General: Skin is warm and dry.   Neurological:      Mental Status: She is alert and oriented to person, place, and time.       Tree Aguilar MD    "

## 2024-03-14 ENCOUNTER — OFFICE VISIT (OUTPATIENT)
Dept: UROLOGY | Facility: CLINIC | Age: 74
End: 2024-03-14
Payer: MEDICARE

## 2024-03-14 VITALS
OXYGEN SATURATION: 96 % | HEART RATE: 57 BPM | SYSTOLIC BLOOD PRESSURE: 118 MMHG | RESPIRATION RATE: 14 BRPM | WEIGHT: 157.2 LBS | HEIGHT: 63 IN | BODY MASS INDEX: 27.85 KG/M2 | DIASTOLIC BLOOD PRESSURE: 70 MMHG

## 2024-03-14 DIAGNOSIS — R31.9 HEMATURIA: ICD-10-CM

## 2024-03-14 DIAGNOSIS — F39 MOOD DISORDER (HCC): ICD-10-CM

## 2024-03-14 DIAGNOSIS — N39.0 URINARY TRACT INFECTION WITHOUT HEMATURIA, SITE UNSPECIFIED: Primary | ICD-10-CM

## 2024-03-14 LAB
SL AMB  POCT GLUCOSE, UA: NORMAL
SL AMB LEUKOCYTE ESTERASE,UA: NORMAL
SL AMB POCT BILIRUBIN,UA: NORMAL
SL AMB POCT BLOOD,UA: NORMAL
SL AMB POCT CLARITY,UA: CLEAR
SL AMB POCT COLOR,UA: NORMAL
SL AMB POCT KETONES,UA: NORMAL
SL AMB POCT NITRITE,UA: NORMAL
SL AMB POCT PH,UA: 5
SL AMB POCT SPECIFIC GRAVITY,UA: 1.01
SL AMB POCT URINE PROTEIN: NORMAL
SL AMB POCT UROBILINOGEN: NORMAL

## 2024-03-14 PROCEDURE — 99213 OFFICE O/P EST LOW 20 MIN: CPT | Performed by: PHYSICIAN ASSISTANT

## 2024-03-14 PROCEDURE — 81002 URINALYSIS NONAUTO W/O SCOPE: CPT | Performed by: PHYSICIAN ASSISTANT

## 2024-03-14 RX ORDER — ESCITALOPRAM OXALATE 10 MG/1
10 TABLET ORAL DAILY
Qty: 90 TABLET | Refills: 1 | Status: SHIPPED | OUTPATIENT
Start: 2024-03-14

## 2024-03-14 RX ORDER — PSEUDOEPHEDRINE HCL 30 MG
TABLET ORAL
COMMUNITY
Start: 2024-03-11

## 2024-03-14 RX ORDER — B-COMPLEX WITH VITAMIN C
TABLET ORAL
COMMUNITY

## 2024-03-14 NOTE — PROGRESS NOTES
UROLOGY PROGRESS NOTE   Patient Identifiers: Trina Davis (MRN [id number])  Date of Service: 3/14/2024    Subjective:   73-year-old female with history of recurrent UTI and incomplete bladder emptying.  She was doing CIC daily but has since stopped.  She is on Hip-Devang for her recurrent UTIs has not had a positive culture in quite some time.  Urine dip in the office today was clear.  Reason for visit: Recurrent UTI follow-up    Objective:     VITALS:    Vitals:    03/14/24 1127   BP: 118/70   Pulse: 57   Resp: 14   SpO2: 96%           LABS:  Lab Results   Component Value Date    HGB 11.4 (L) 02/04/2024    HCT 35.8 02/04/2024    WBC 10.02 02/04/2024     02/04/2024   ]    Lab Results   Component Value Date    K 5.1 03/05/2024     03/05/2024    CO2 22 03/05/2024    BUN 64 (H) 03/05/2024    CREATININE 2.85 (H) 03/05/2024    CALCIUM 10.3 (H) 03/05/2024    GLUCOSE 168 (H) 11/30/2018   ]        INPATIENT MEDS:    Current Outpatient Medications:     albuterol (Ventolin HFA) 90 mcg/act inhaler, Inhale 2 puffs every 6 (six) hours as needed for wheezing, Disp: 54 g, Rfl: 0    amLODIPine (NORVASC) 5 mg tablet, Take 1 tablet (5 mg total) by mouth daily, Disp: 90 tablet, Rfl: 1    aspirin (ECOTRIN LOW STRENGTH) 81 mg EC tablet, Take 1 tablet (81 mg total) by mouth daily Do not start before October 31, 2023., Disp: , Rfl: 0    B Complex Vitamins (Vitamin B Complex) TABS, Take by mouth, Disp: , Rfl:     B-D ULTRAFINE III SHORT PEN 31G X 8 MM MISC, , Disp: , Rfl: 3    Calcium Citrate 250 MG TABS, TAKE 2 TABLETS (500MG) 2 TIMES A DAY, Disp: , Rfl:     cholecalciferol (VITAMIN D3) 1,000 units tablet, Take 2 tablets (2,000 Units total) by mouth daily, Disp: 180 tablet, Rfl: 1    Coenzyme Q10 (CoQ10) 100 MG CAPS, Take 100 mg by mouth in the morning Bed time, Disp: , Rfl:     escitalopram (Lexapro) 10 mg tablet, Take 1 tablet (10 mg total) by mouth daily, Disp: 30 tablet, Rfl: 2    hydrALAZINE (APRESOLINE) 25 mg tablet, Take  1 tablet (25 mg total) by mouth every evening, Disp: 30 tablet, Rfl: 2    Icosapent Ethyl 1 g CAPS, Take 2 capsules (2 g total) by mouth 2 (two) times a day, Disp: 120 capsule, Rfl: 3    insulin aspart (NovoLOG) 100 units/mL injection, Inject under the skin 3 (three) times a day before meals 18 units, 18 units, 22 units., Disp: , Rfl:     insulin detemir (Levemir FlexTouch) 100 Units/mL injection pen, Inject 50 Units under the skin every evening , Disp: , Rfl:     Lactobacillus (PROBIOTIC ACIDOPHILUS PO), Take 1 capsule by mouth 2 (two) times a day, Disp: , Rfl:     levothyroxine 125 mcg tablet, Take 1 tablet (125 mcg total) by mouth daily, Disp: 30 tablet, Rfl: 2    losartan (COZAAR) 25 mg tablet, Take 1 tablet (25 mg total) by mouth daily, Disp: 60 tablet, Rfl: 3    methenamine hippurate (HIPREX) 1 g tablet, Take 1 tablet (1 g total) by mouth 2 (two) times a day with meals, Disp: 180 tablet, Rfl: 3    metoprolol tartrate (LOPRESSOR) 50 mg tablet, Take 1 tablet (50 mg total) by mouth every 12 (twelve) hours, Disp: 180 tablet, Rfl: 3    pantoprazole (PROTONIX) 40 mg tablet, Take 1 tablet (40 mg total) by mouth 2 (two) times a day, Disp: 180 tablet, Rfl: 1    pramipexole (MIRAPEX) 0.25 mg tablet, Take 1 tablet (0.25 mg total) by mouth daily at bedtime, Disp: 90 tablet, Rfl: 0    Restasis 0.05 % ophthalmic emulsion, Administer 1 drop to both eyes every 12 (twelve) hours, Disp: , Rfl:     rosuvastatin (CRESTOR) 5 mg tablet, Take 1 tablet (5 mg total) by mouth daily, Disp: 90 tablet, Rfl: 3    spironolactone (ALDACTONE) 25 mg tablet, TAKE 1 TABLET (25 MG TOTAL) BY MOUTH DAILY., Disp: 90 tablet, Rfl: 2    Calcium Citrate 1040 MG TABS, Take 0.4808 tablets (500 mg total) by mouth 2 (two) times a day (Patient not taking: Reported on 3/14/2024), Disp: 60 tablet, Rfl: 2    famotidine (PEPCID) 10 mg tablet, Take 1 tablet (10 mg total) by mouth daily Do not start before September 13, 2023., Disp: 30 tablet, Rfl: 0      Physical  "Exam:   /70 (BP Location: Left arm, Patient Position: Sitting, Cuff Size: Standard)   Pulse 57   Resp 14   Ht 5' 3\" (1.6 m)   Wt 71.3 kg (157 lb 3.2 oz)   SpO2 96%   BMI 27.85 kg/m²   GEN: no acute distress    RES  P: breathing comfortably with no accessory muscle use    ABD: soft, non-tender, non-distended   INCISION:    EXT: no significant peripheral edema       RADIOLOGY:   None    Assessment:   #1.  Recurrent UTI  #2.  Neurogenic bladder    Plan:   -She has some urge incontinence.  No recent UTIs.  -Continue her current regime and follow-up in 6 months with kidney and bladder ultrasound prior to visit  -  -          "

## 2024-03-19 DIAGNOSIS — G25.81 RLS (RESTLESS LEGS SYNDROME): ICD-10-CM

## 2024-03-19 RX ORDER — PRAMIPEXOLE DIHYDROCHLORIDE 0.25 MG/1
0.25 TABLET ORAL
Qty: 90 TABLET | Refills: 0 | Status: SHIPPED | OUTPATIENT
Start: 2024-03-19

## 2024-04-01 ENCOUNTER — APPOINTMENT (OUTPATIENT)
Dept: LAB | Facility: AMBULARY SURGERY CENTER | Age: 74
End: 2024-04-01
Payer: MEDICARE

## 2024-04-01 DIAGNOSIS — Z86.73 HISTORY OF CEREBROVASCULAR ACCIDENT (CVA) DUE TO ISCHEMIA: Chronic | ICD-10-CM

## 2024-04-01 DIAGNOSIS — J44.9 CHRONIC OBSTRUCTIVE PULMONARY DISEASE, UNSPECIFIED COPD TYPE (HCC): Chronic | ICD-10-CM

## 2024-04-01 DIAGNOSIS — N18.4 CKD (CHRONIC KIDNEY DISEASE) STAGE 4, GFR 15-29 ML/MIN (HCC): ICD-10-CM

## 2024-04-01 DIAGNOSIS — E21.3 HYPERPARATHYROIDISM (HCC): ICD-10-CM

## 2024-04-01 DIAGNOSIS — N18.4 ANEMIA DUE TO STAGE 4 CHRONIC KIDNEY DISEASE  (HCC): ICD-10-CM

## 2024-04-01 DIAGNOSIS — Z79.4 TYPE 2 DIABETES MELLITUS WITH STAGE 4 CHRONIC KIDNEY DISEASE, WITH LONG-TERM CURRENT USE OF INSULIN (HCC): Chronic | ICD-10-CM

## 2024-04-01 DIAGNOSIS — E11.22 TYPE 2 DIABETES MELLITUS WITH STAGE 4 CHRONIC KIDNEY DISEASE, WITH LONG-TERM CURRENT USE OF INSULIN (HCC): Chronic | ICD-10-CM

## 2024-04-01 DIAGNOSIS — E55.9 VITAMIN D DEFICIENCY: Chronic | ICD-10-CM

## 2024-04-01 DIAGNOSIS — N18.4 ANEMIA IN STAGE 4 CHRONIC KIDNEY DISEASE  (HCC): ICD-10-CM

## 2024-04-01 DIAGNOSIS — I10 ESSENTIAL HYPERTENSION: ICD-10-CM

## 2024-04-01 DIAGNOSIS — K21.9 GASTROESOPHAGEAL REFLUX DISEASE WITHOUT ESOPHAGITIS: Chronic | ICD-10-CM

## 2024-04-01 DIAGNOSIS — Z87.440 HISTORY OF RECURRENT UTIS: Chronic | ICD-10-CM

## 2024-04-01 DIAGNOSIS — N18.4 TYPE 2 DIABETES MELLITUS WITH STAGE 4 CHRONIC KIDNEY DISEASE, WITH LONG-TERM CURRENT USE OF INSULIN (HCC): Chronic | ICD-10-CM

## 2024-04-01 DIAGNOSIS — N25.81 SECONDARY HYPERPARATHYROIDISM OF RENAL ORIGIN (HCC): Chronic | ICD-10-CM

## 2024-04-01 DIAGNOSIS — N18.5 CHRONIC RENAL DISEASE, STAGE V (HCC): ICD-10-CM

## 2024-04-01 DIAGNOSIS — D63.1 ANEMIA DUE TO STAGE 4 CHRONIC KIDNEY DISEASE  (HCC): ICD-10-CM

## 2024-04-01 DIAGNOSIS — E78.2 MIXED HYPERLIPIDEMIA: ICD-10-CM

## 2024-04-01 DIAGNOSIS — D63.1 ANEMIA IN STAGE 4 CHRONIC KIDNEY DISEASE  (HCC): ICD-10-CM

## 2024-04-01 DIAGNOSIS — F03.A0 MILD DEMENTIA WITHOUT BEHAVIORAL DISTURBANCE, PSYCHOTIC DISTURBANCE, MOOD DISTURBANCE, OR ANXIETY, UNSPECIFIED DEMENTIA TYPE (HCC): Chronic | ICD-10-CM

## 2024-04-01 DIAGNOSIS — I12.9 PARENCHYMAL RENAL HYPERTENSION, STAGE 1 THROUGH STAGE 4 OR UNSPECIFIED CHRONIC KIDNEY DISEASE: ICD-10-CM

## 2024-04-01 DIAGNOSIS — N18.4 STAGE 4 CHRONIC KIDNEY DISEASE (HCC): ICD-10-CM

## 2024-04-01 DIAGNOSIS — R80.1 PERSISTENT PROTEINURIA: ICD-10-CM

## 2024-04-01 LAB
25(OH)D3 SERPL-MCNC: 30.1 NG/ML (ref 30–100)
ALBUMIN SERPL BCP-MCNC: 3.9 G/DL (ref 3.5–5)
ALP SERPL-CCNC: 54 U/L (ref 34–104)
ALT SERPL W P-5'-P-CCNC: 67 U/L (ref 7–52)
ANION GAP SERPL CALCULATED.3IONS-SCNC: 8 MMOL/L (ref 4–13)
AST SERPL W P-5'-P-CCNC: 28 U/L (ref 13–39)
BILIRUB SERPL-MCNC: 0.39 MG/DL (ref 0.2–1)
BUN SERPL-MCNC: 73 MG/DL (ref 5–25)
CALCIUM SERPL-MCNC: 9 MG/DL (ref 8.4–10.2)
CHLORIDE SERPL-SCNC: 108 MMOL/L (ref 96–108)
CO2 SERPL-SCNC: 18 MMOL/L (ref 21–32)
CREAT SERPL-MCNC: 3.48 MG/DL (ref 0.6–1.3)
CREAT UR-MCNC: 83.3 MG/DL
ERYTHROCYTE [DISTWIDTH] IN BLOOD BY AUTOMATED COUNT: 13.8 % (ref 11.6–15.1)
GFR SERPL CREATININE-BSD FRML MDRD: 12 ML/MIN/1.73SQ M
GLUCOSE P FAST SERPL-MCNC: 127 MG/DL (ref 65–99)
HCT VFR BLD AUTO: 35.8 % (ref 34.8–46.1)
HGB BLD-MCNC: 11.2 G/DL (ref 11.5–15.4)
MAGNESIUM SERPL-MCNC: 2.5 MG/DL (ref 1.9–2.7)
MCH RBC QN AUTO: 29.8 PG (ref 26.8–34.3)
MCHC RBC AUTO-ENTMCNC: 31.3 G/DL (ref 31.4–37.4)
MCV RBC AUTO: 95 FL (ref 82–98)
PHOSPHATE SERPL-MCNC: 5.2 MG/DL (ref 2.3–4.1)
PLATELET # BLD AUTO: 291 THOUSANDS/UL (ref 149–390)
PMV BLD AUTO: 11.7 FL (ref 8.9–12.7)
POTASSIUM SERPL-SCNC: 5.8 MMOL/L (ref 3.5–5.3)
PROT SERPL-MCNC: 6.4 G/DL (ref 6.4–8.4)
PROT UR-MCNC: 117 MG/DL
PROT/CREAT UR: 1.4 MG/G{CREAT} (ref 0–0.1)
PTH-INTACT SERPL-MCNC: 34.4 PG/ML (ref 12–88)
RBC # BLD AUTO: 3.76 MILLION/UL (ref 3.81–5.12)
SODIUM SERPL-SCNC: 134 MMOL/L (ref 135–147)
WBC # BLD AUTO: 9.93 THOUSAND/UL (ref 4.31–10.16)

## 2024-04-01 PROCEDURE — 36415 COLL VENOUS BLD VENIPUNCTURE: CPT

## 2024-04-01 PROCEDURE — 82570 ASSAY OF URINE CREATININE: CPT

## 2024-04-01 PROCEDURE — 83735 ASSAY OF MAGNESIUM: CPT

## 2024-04-01 PROCEDURE — 85027 COMPLETE CBC AUTOMATED: CPT

## 2024-04-01 PROCEDURE — 84100 ASSAY OF PHOSPHORUS: CPT

## 2024-04-01 PROCEDURE — 84156 ASSAY OF PROTEIN URINE: CPT

## 2024-04-01 PROCEDURE — 80053 COMPREHEN METABOLIC PANEL: CPT

## 2024-04-01 PROCEDURE — 82306 VITAMIN D 25 HYDROXY: CPT

## 2024-04-01 PROCEDURE — 83970 ASSAY OF PARATHORMONE: CPT

## 2024-04-02 ENCOUNTER — TELEPHONE (OUTPATIENT)
Age: 74
End: 2024-04-02

## 2024-04-02 DIAGNOSIS — N18.4 CKD (CHRONIC KIDNEY DISEASE) STAGE 4, GFR 15-29 ML/MIN (HCC): Primary | ICD-10-CM

## 2024-04-02 NOTE — TELEPHONE ENCOUNTER
Patient called with concerns about her recent lab work and her previous appointment. She wanted to Know if Dr. Baires can reach out to her to answer some questions she has. She wants to know if there is anything that will make her feel better. Can the Doctor please reach out to address these concerns. Thank you.

## 2024-04-03 DIAGNOSIS — N25.81 SECONDARY HYPERPARATHYROIDISM OF RENAL ORIGIN (HCC): ICD-10-CM

## 2024-04-03 DIAGNOSIS — I12.9 PARENCHYMAL RENAL HYPERTENSION, STAGE 1 THROUGH STAGE 4 OR UNSPECIFIED CHRONIC KIDNEY DISEASE: ICD-10-CM

## 2024-04-03 DIAGNOSIS — I10 ESSENTIAL HYPERTENSION: ICD-10-CM

## 2024-04-03 DIAGNOSIS — E03.9 ACQUIRED HYPOTHYROIDISM: Chronic | ICD-10-CM

## 2024-04-03 DIAGNOSIS — E55.9 VITAMIN D DEFICIENCY: ICD-10-CM

## 2024-04-03 RX ORDER — HYDRALAZINE HYDROCHLORIDE 25 MG/1
25 TABLET, FILM COATED ORAL EVERY EVENING
Qty: 90 TABLET | Refills: 1 | Status: SHIPPED | OUTPATIENT
Start: 2024-04-03

## 2024-04-03 RX ORDER — LEVOTHYROXINE SODIUM 0.12 MG/1
125 TABLET ORAL DAILY
Qty: 90 TABLET | Refills: 1 | Status: SHIPPED | OUTPATIENT
Start: 2024-04-03 | End: 2024-04-10

## 2024-04-03 RX ORDER — PSEUDOEPHEDRINE HCL 30 MG
TABLET ORAL
Qty: 360 TABLET | Refills: 1 | Status: SHIPPED | OUTPATIENT
Start: 2024-04-03

## 2024-04-08 ENCOUNTER — APPOINTMENT (OUTPATIENT)
Dept: LAB | Facility: AMBULARY SURGERY CENTER | Age: 74
End: 2024-04-08
Payer: MEDICARE

## 2024-04-08 DIAGNOSIS — Z79.4 TYPE 2 DIABETES MELLITUS WITH STAGE 4 CHRONIC KIDNEY DISEASE, WITH LONG-TERM CURRENT USE OF INSULIN (HCC): Chronic | ICD-10-CM

## 2024-04-08 DIAGNOSIS — E83.52 HYPERCALCEMIA: ICD-10-CM

## 2024-04-08 DIAGNOSIS — N18.4 CKD (CHRONIC KIDNEY DISEASE) STAGE 4, GFR 15-29 ML/MIN (HCC): ICD-10-CM

## 2024-04-08 DIAGNOSIS — E11.22 TYPE 2 DIABETES MELLITUS WITH STAGE 4 CHRONIC KIDNEY DISEASE, WITH LONG-TERM CURRENT USE OF INSULIN (HCC): Chronic | ICD-10-CM

## 2024-04-08 DIAGNOSIS — E78.2 MIXED HYPERLIPIDEMIA: ICD-10-CM

## 2024-04-08 DIAGNOSIS — N18.4 TYPE 2 DIABETES MELLITUS WITH STAGE 4 CHRONIC KIDNEY DISEASE, WITH LONG-TERM CURRENT USE OF INSULIN (HCC): Chronic | ICD-10-CM

## 2024-04-08 DIAGNOSIS — E53.8 B12 DEFICIENCY: Chronic | ICD-10-CM

## 2024-04-08 LAB
ANION GAP SERPL CALCULATED.3IONS-SCNC: 9 MMOL/L (ref 4–13)
BUN SERPL-MCNC: 75 MG/DL (ref 5–25)
CALCIUM SERPL-MCNC: 9.3 MG/DL (ref 8.4–10.2)
CHLORIDE SERPL-SCNC: 106 MMOL/L (ref 96–108)
CO2 SERPL-SCNC: 20 MMOL/L (ref 21–32)
CREAT SERPL-MCNC: 3 MG/DL (ref 0.6–1.3)
GFR SERPL CREATININE-BSD FRML MDRD: 14 ML/MIN/1.73SQ M
GLUCOSE P FAST SERPL-MCNC: 145 MG/DL (ref 65–99)
LDLC SERPL DIRECT ASSAY-MCNC: 25 MG/DL (ref 0–100)
POTASSIUM SERPL-SCNC: 4.5 MMOL/L (ref 3.5–5.3)
SODIUM SERPL-SCNC: 135 MMOL/L (ref 135–147)
VIT B12 SERPL-MCNC: 2454 PG/ML (ref 180–914)

## 2024-04-08 PROCEDURE — 83721 ASSAY OF BLOOD LIPOPROTEIN: CPT

## 2024-04-08 PROCEDURE — 82607 VITAMIN B-12: CPT

## 2024-04-08 PROCEDURE — 80048 BASIC METABOLIC PNL TOTAL CA: CPT

## 2024-04-08 PROCEDURE — 36415 COLL VENOUS BLD VENIPUNCTURE: CPT

## 2024-04-09 ENCOUNTER — TELEPHONE (OUTPATIENT)
Dept: NEPHROLOGY | Facility: CLINIC | Age: 74
End: 2024-04-09

## 2024-04-09 DIAGNOSIS — N18.4 CKD (CHRONIC KIDNEY DISEASE) STAGE 4, GFR 15-29 ML/MIN (HCC): Primary | ICD-10-CM

## 2024-04-09 DIAGNOSIS — Z79.4 TYPE 2 DIABETES MELLITUS WITH STAGE 4 CHRONIC KIDNEY DISEASE, WITH LONG-TERM CURRENT USE OF INSULIN (HCC): Primary | Chronic | ICD-10-CM

## 2024-04-09 DIAGNOSIS — E78.2 MIXED HYPERLIPIDEMIA: ICD-10-CM

## 2024-04-09 DIAGNOSIS — E11.22 TYPE 2 DIABETES MELLITUS WITH STAGE 4 CHRONIC KIDNEY DISEASE, WITH LONG-TERM CURRENT USE OF INSULIN (HCC): Primary | Chronic | ICD-10-CM

## 2024-04-09 DIAGNOSIS — K21.9 GASTROESOPHAGEAL REFLUX DISEASE, UNSPECIFIED WHETHER ESOPHAGITIS PRESENT: ICD-10-CM

## 2024-04-09 DIAGNOSIS — N18.4 TYPE 2 DIABETES MELLITUS WITH STAGE 4 CHRONIC KIDNEY DISEASE, WITH LONG-TERM CURRENT USE OF INSULIN (HCC): Primary | Chronic | ICD-10-CM

## 2024-04-09 NOTE — TELEPHONE ENCOUNTER
----- Message from Donaldo Baires MD sent at 4/8/2024  2:37 PM EDT -----  Labs are better!  No changes  Repeat a basic metabolic profile in 4 to 6 weeks    ----- Message -----  From: Lab, Background User  Sent: 4/8/2024   2:06 PM EDT  To: Donaldo Baires MD

## 2024-04-10 ENCOUNTER — HOSPITAL ENCOUNTER (OUTPATIENT)
Dept: ULTRASOUND IMAGING | Facility: HOSPITAL | Age: 74
Discharge: HOME | End: 2024-04-10
Payer: MEDICARE

## 2024-04-10 ENCOUNTER — APPOINTMENT (OUTPATIENT)
Dept: LAB | Facility: AMBULARY SURGERY CENTER | Age: 74
End: 2024-04-10
Payer: MEDICARE

## 2024-04-10 DIAGNOSIS — E53.8 B12 DEFICIENCY: Chronic | ICD-10-CM

## 2024-04-10 DIAGNOSIS — E11.22 TYPE 2 DIABETES MELLITUS WITH STAGE 4 CHRONIC KIDNEY DISEASE, WITH LONG-TERM CURRENT USE OF INSULIN (HCC): Chronic | ICD-10-CM

## 2024-04-10 DIAGNOSIS — Z79.4 TYPE 2 DIABETES MELLITUS WITH STAGE 4 CHRONIC KIDNEY DISEASE, WITH LONG-TERM CURRENT USE OF INSULIN (HCC): Chronic | ICD-10-CM

## 2024-04-10 DIAGNOSIS — E83.52 HYPERCALCEMIA: ICD-10-CM

## 2024-04-10 DIAGNOSIS — N18.4 TYPE 2 DIABETES MELLITUS WITH STAGE 4 CHRONIC KIDNEY DISEASE, WITH LONG-TERM CURRENT USE OF INSULIN (HCC): Chronic | ICD-10-CM

## 2024-04-10 DIAGNOSIS — E78.2 MIXED HYPERLIPIDEMIA: ICD-10-CM

## 2024-04-10 DIAGNOSIS — E03.9 ACQUIRED HYPOTHYROIDISM: Chronic | ICD-10-CM

## 2024-04-10 DIAGNOSIS — N39.0 URINARY TRACT INFECTION WITHOUT HEMATURIA, SITE UNSPECIFIED: ICD-10-CM

## 2024-04-10 DIAGNOSIS — N18.4 CKD (CHRONIC KIDNEY DISEASE) STAGE 4, GFR 15-29 ML/MIN (HCC): ICD-10-CM

## 2024-04-10 LAB — TSH SERPL DL<=0.05 MIU/L-ACNC: 0.14 UIU/ML (ref 0.45–4.5)

## 2024-04-10 PROCEDURE — 76775 US EXAM ABDO BACK WALL LIM: CPT

## 2024-04-10 PROCEDURE — 82397 CHEMILUMINESCENT ASSAY: CPT

## 2024-04-10 PROCEDURE — 36415 COLL VENOUS BLD VENIPUNCTURE: CPT

## 2024-04-10 PROCEDURE — 84443 ASSAY THYROID STIM HORMONE: CPT

## 2024-04-10 PROCEDURE — 82172 ASSAY OF APOLIPOPROTEIN: CPT

## 2024-04-10 RX ORDER — LEVOTHYROXINE SODIUM 0.1 MG/1
100 TABLET ORAL DAILY
Qty: 30 TABLET | Refills: 1 | Status: SHIPPED | OUTPATIENT
Start: 2024-04-10

## 2024-04-10 RX ORDER — PANTOPRAZOLE SODIUM 40 MG/1
40 TABLET, DELAYED RELEASE ORAL 2 TIMES DAILY
Qty: 180 TABLET | Refills: 1 | Status: SHIPPED | OUTPATIENT
Start: 2024-04-10

## 2024-04-10 NOTE — PROGRESS NOTES
TSH still suppressed at 0.140 on 4/10/24.  [facility] TSH 0.31.  Would recommend further dose decrease of her levothyroxine to 100 mcg daily.  Repeat TSH in 6 weeks again.

## 2024-04-11 LAB — APO B SERPL-MCNC: 43 MG/DL

## 2024-04-16 DIAGNOSIS — E78.2 MIXED HYPERLIPIDEMIA: ICD-10-CM

## 2024-04-16 LAB — PTH RELATED PROT SERPL-SCNC: <2 PMOL/L

## 2024-04-16 RX ORDER — ICOSAPENT ETHYL 1000 MG/1
2 CAPSULE ORAL 2 TIMES DAILY
Qty: 120 CAPSULE | Refills: 0 | Status: CANCELLED | OUTPATIENT
Start: 2024-04-16

## 2024-04-17 RX ORDER — ICOSAPENT ETHYL 1000 MG/1
2 CAPSULE ORAL 2 TIMES DAILY
Qty: 120 CAPSULE | Refills: 11 | Status: CANCELLED | OUTPATIENT
Start: 2024-04-17

## 2024-04-17 RX ORDER — ICOSAPENT ETHYL 500 MG/1
2 CAPSULE ORAL 2 TIMES DAILY
Qty: 120 CAPSULE | Refills: 3 | Status: SHIPPED | OUTPATIENT
Start: 2024-04-17

## 2024-04-17 NOTE — TELEPHONE ENCOUNTER
Lcosapent - Refill must be reviewed and completed by the office or provider. The refill is unable to be approved or denied by the medication management team.

## 2024-04-19 RX ORDER — ICOSAPENT ETHYL 500 MG/1
0.5 CAPSULE ORAL 2 TIMES DAILY
Qty: 180 CAPSULE | Refills: 3 | Status: SHIPPED | OUTPATIENT
Start: 2024-04-19

## 2024-04-23 ENCOUNTER — CONSULT (OUTPATIENT)
Dept: INTERNAL MEDICINE CLINIC | Facility: CLINIC | Age: 74
End: 2024-04-23
Payer: MEDICARE

## 2024-04-23 VITALS
RESPIRATION RATE: 20 BRPM | HEIGHT: 64 IN | HEART RATE: 53 BPM | WEIGHT: 162 LBS | OXYGEN SATURATION: 97 % | SYSTOLIC BLOOD PRESSURE: 159 MMHG | DIASTOLIC BLOOD PRESSURE: 74 MMHG | TEMPERATURE: 98 F | BODY MASS INDEX: 27.66 KG/M2

## 2024-04-23 DIAGNOSIS — N25.81 SECONDARY HYPERPARATHYROIDISM OF RENAL ORIGIN (HCC): ICD-10-CM

## 2024-04-23 DIAGNOSIS — Z01.818 PRE-OP EVALUATION: Primary | ICD-10-CM

## 2024-04-23 DIAGNOSIS — Z23 NEED FOR COVID-19 VACCINE: ICD-10-CM

## 2024-04-23 DIAGNOSIS — I12.9 PARENCHYMAL RENAL HYPERTENSION, STAGE 1 THROUGH STAGE 4 OR UNSPECIFIED CHRONIC KIDNEY DISEASE: ICD-10-CM

## 2024-04-23 DIAGNOSIS — I10 ESSENTIAL HYPERTENSION: ICD-10-CM

## 2024-04-23 PROCEDURE — 90480 ADMN SARSCOV2 VAC 1/ONLY CMP: CPT | Performed by: INTERNAL MEDICINE

## 2024-04-23 PROCEDURE — 91320 SARSCV2 VAC 30MCG TRS-SUC IM: CPT | Performed by: INTERNAL MEDICINE

## 2024-04-23 PROCEDURE — 99215 OFFICE O/P EST HI 40 MIN: CPT | Performed by: INTERNAL MEDICINE

## 2024-04-23 RX ORDER — HYDRALAZINE HYDROCHLORIDE 25 MG/1
25 TABLET, FILM COATED ORAL 2 TIMES DAILY
Start: 2024-04-23

## 2024-04-23 NOTE — PROGRESS NOTES
INTERNAL MEDICINE PRE-OPERATIVE EVALUATION  [facility] PHYSICIAN GROUP - [facility] INTERNAL MEDICINE [place]    NAME: Trina Davis  AGE: 74 y.o. SEX: female  : 1950     DATE: 2024     Internal Medicine Pre-Operative Evaluation:     Chief Complaint: Pre-operative Evaluation     Surgery: R cataract surgery  Anticipated Date of Surgery: 2024  Referring Provider: No ref. provider found        History of Present Illness:     Trina Davis is a 74 y.o. female who presents to the office today for a preoperative consultation at the request of surgeon, who plans on performing R cataracts surgery on 24. Patient has a bleeding risk of: no recent abnormal bleeding and use of Ca-channel blockers (see med list) Current anti-platelet/anti-coagulation medications that the patient is prescribed includes: Aspirin.      Assessment of Chronic Conditions:   - Diabetes Mellitus: controlled   - Renal Failure with serum creatinine >2mg/dL: stable   - Hypertension: elevated BP in the office, adjusted medications     Assessment of Cardiac Risk:  Denies unstable or severe angina or MI in the last 6 weeks or history of stent placement in the last year   Denies decompensated heart failure (e.g. New onset heart failure, NYHA functional class IV heart failure, or worsening existing heart failure)  Denies significant arrhythmias such as high grade AV block, symptomatic ventricular arrhythmia, newly recognized ventricular tachycardia, supraventricular tachycardia with resting heart rate >100, or symptomatic bradycardia  Denies severe heart valve disease including aortic stenosis or symptomatic mitral stenosis             Review of Systems:     Review of Systems   Constitutional:  Negative for appetite change and fatigue.   Respiratory:  Positive for shortness of breath (on exertion). Negative for cough, chest tightness and wheezing.    Cardiovascular:  Negative for chest pain, palpitations and leg swelling.   Gastrointestinal:   Negative for abdominal pain, nausea and vomiting.   Genitourinary:  Negative for difficulty urinating and frequency.   Musculoskeletal:  Negative for arthralgias and joint swelling.   Skin:  Negative for rash.   Neurological:  Negative for dizziness and headaches.   Psychiatric/Behavioral:  Negative for dysphoric mood. The patient is not nervous/anxious.         Problem List:     Patient Active Problem List   Diagnosis    HLD (hyperlipidemia)    SHAYAN (obstructive sleep apnea)    History of recurrent UTIs    CKD (chronic kidney disease) stage 4, GFR 15-29 ml/min ([facility])    Acquired hypothyroidism    RLS (restless legs syndrome)    Vitamin D deficiency    Fibromyalgia    Type 2 diabetes mellitus with stage 4 chronic kidney disease, with long-term current use of insulin ([facility])    Gastroesophageal reflux disease without esophagitis    Gastroparesis diabeticorum  ([facility])    Epigastric pain    COPD (chronic obstructive pulmonary disease) ([facility])    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    Fracture of orbital floor, right side, initial encounter for closed fracture ([facility])    Seasonal allergies    Osteoporosis    Palpitations    Varicose veins of both lower extremities with pain    Postural dizziness with presyncope    Mild dementia without behavioral disturbance, psychotic disturbance, mood disturbance, or anxiety ([facility])    Parenchymal renal hypertension    Anemia in stage 4 chronic kidney disease  ([facility])    Secondary hyperparathyroidism of renal origin ([facility])    B12 deficiency    History of cerebrovascular accident (CVA) due to ischemia    Mixed stress and urge urinary incontinence    Slow transit constipation        Allergies:     Allergies   Allergen Reactions    Ciprofloxacin Hives    Sulfamethoxazole-Trimethoprim Tongue Swelling        Current Medications:       Current Outpatient Medications:     albuterol (Ventolin HFA) 90 mcg/act inhaler, Inhale 2 puffs every 6 (six) hours as needed for wheezing, Disp: 54 g, Rfl:  0    amLODIPine (NORVASC) 5 mg tablet, Take 1 tablet (5 mg total) by mouth daily, Disp: 90 tablet, Rfl: 1    aspirin (ECOTRIN LOW STRENGTH) 81 mg EC tablet, Take 1 tablet (81 mg total) by mouth daily Do not start before October 31, 2023., Disp: , Rfl: 0    Calcium Citrate 250 MG TABS, TAKE 2 TABLETS (500MG) 2 TIMES A DAY, Disp: 360 tablet, Rfl: 1    Coenzyme Q10 (CoQ10) 100 MG CAPS, Take 100 mg by mouth in the morning Bed time, Disp: , Rfl:     Cyanocobalamin (Vitamin B12) 1000 MCG TBCR, Take 1,000 mcg by mouth once a week, Disp: , Rfl:     famotidine (PEPCID) 10 mg tablet, Take 1 tablet (10 mg total) by mouth daily Do not start before September 13, 2023., Disp: 30 tablet, Rfl: 0    hydrALAZINE (APRESOLINE) 25 mg tablet, Take 1 tablet (25 mg total) by mouth 2 (two) times a day, Disp: , Rfl:     Icosapent Ethyl (Vascepa) 0.5 g CAPS, Take 2 g by mouth 2 (two) times a day, Disp: 120 capsule, Rfl: 3    insulin aspart (NovoLOG) 100 units/mL injection, Inject under the skin 3 (three) times a day before meals 5units, 5units, 10units., Disp: , Rfl:     insulin detemir (Levemir FlexTouch) 100 Units/mL injection pen, Inject 20 Units under the skin every evening, Disp: , Rfl:     levothyroxine 100 mcg tablet, Take 1 tablet (100 mcg total) by mouth daily, Disp: 30 tablet, Rfl: 1    methenamine hippurate (HIPREX) 1 g tablet, Take 1 tablet (1 g total) by mouth 2 (two) times a day with meals, Disp: 180 tablet, Rfl: 3    metoprolol tartrate (LOPRESSOR) 50 mg tablet, Take 1 tablet (50 mg total) by mouth every 12 (twelve) hours, Disp: 180 tablet, Rfl: 3    pantoprazole (PROTONIX) 40 mg tablet, Take 1 tablet (40 mg total) by mouth 2 (two) times a day, Disp: 180 tablet, Rfl: 1    pramipexole (MIRAPEX) 0.25 mg tablet, Take 1 tablet (0.25 mg total) by mouth daily at bedtime, Disp: 90 tablet, Rfl: 0    Restasis 0.05 % ophthalmic emulsion, Administer 1 drop to both eyes every 12 (twelve) hours, Disp: , Rfl:     rosuvastatin (CRESTOR) 5 mg  "tablet, Take 1 tablet (5 mg total) by mouth daily, Disp: 90 tablet, Rfl: 3    B-D ULTRAFINE III SHORT PEN 31G X 8 MM MISC, , Disp: , Rfl: 3    Icosapent Ethyl (Vascepa) 0.5 g CAPS, Take 0.5 mg by mouth 2 (two) times a day, Disp: 180 capsule, Rfl: 3    Icosapent Ethyl 1 g CAPS, Take 2 capsules (2 g total) by mouth 2 (two) times a day, Disp: 120 capsule, Rfl: 3     Past History:     Past Medical History:   Diagnosis Date    Actinic keratosis     Acute cystitis without hematuria 04/28/2023    Acute cystitis without hematuria 04/28/2023    Acute respiratory failure with hypoxia (HCC)     Acute-on-chronic kidney injury  (HCC)     NIKOS (acute kidney injury) (HCC) 05/08/2020    Allergic     Allergic rhinitis     Ambulatory dysfunction 10/22/2020    AMS (altered mental status) 07/14/2020    Anesthesia     pt reports \"had to use double lumen endo tube for hiatal hernia repair/so surgeon could get to where he needed to work\"    Arthritis     Aspiration into airway     Michael esophagus 1993    Lot of stress with children    Basal cell carcinoma 2007    left cheek     BCC (basal cell carcinoma) 05/27/2021    Left Nasal tip    Breast discharge 06/19/2023    Breast pain 06/19/2023    Cancer (HCC)     squamous cell cancer on forhead    Cancer (HCC)     basal cell on nose     Cholelithiasis     Chronic kidney disease 2000, 2018    Stones, kidney disease stage 4    Chronic pain disorder     bilat feet and joint pain on occas    Colon polyp     Confusion 10/30/2023    Constipation     COPD (chronic obstructive pulmonary disease) (HCC)     COVID-19 08/2021    recovered at home/did receive monoclonal infusion    COVID-19 virus infection 08/16/2021    Dental bridge present     Depression     Diabetes mellitus (HCC)     Type 2    Diabetic neuropathy (HCC)     Difficulty walking 2017    Disease of thyroid gland     Dyspnea     Elevated liver enzymes 04/04/2023    Epigastric abdominal pain with severe diabetic gastroparesis, status post " EGD/Botox injection, 5/14 05/17/2021    Facial abrasion, initial encounter 03/22/2023    Fall 03/22/2023    Family history of thyroid problem     Fatty liver     Fibromyalgia, primary     Fracture of orbital floor, blow-out, right, closed, with routine healing, subsequent encounter 03/22/2023    GERD (gastroesophageal reflux disease)     Heart burn     Hiatal hernia     History of cholecystectomy 10/27/2023    History of colon polyps 07/30/2021    History of kidney stones 09/29/2020    Hx of recurrent kidney stones    History of kidney stones 09/29/2020    Hx of recurrent kidney stones  Formatting of this note might be different from the original. Hx of recurrent kidney stones  Last Assessment & Plan:  Formatting of this note might be different from the original. We will set her up for follow-up in 3 months with a KUB prior.  She knows to call if she has any kidney stone type pain in the meantime.    History of Nissen fundoplication 10/27/2023    History of pneumonia     History of repair of hiatal hernia 05/03/2020    Hyperlipidemia     Hyperplasia, parathyroid (HCC)     Hypertension     Hypertensive urgency 10/27/2023    Hyponatremia 04/26/2023    Hypotension 04/13/2022    Kidney problem     Kidney stone     Left hip pain 10/05/2023    Leukocytosis 07/14/2020    Memory loss Julu2 2020    Motion sickness     Motion sickness     Multiple closed fractures of ribs of right side 03/22/2023    Nasal bones, closed fracture 03/22/2023    Neck pain     Obesity 1978    Obesity (BMI 30.0-34.9)     Other chest pain 09/13/2021    Other fatigue 09/21/2023    Peripheral neuropathy     Pollen allergies     RLS (restless legs syndrome)     S/P foot surgery 07/20/2022    SCC (squamous cell carcinoma) 05/04/2021    left mid forehead    SCC (squamous cell carcinoma) 2023    chest    Seasonal allergies     Shingles 01 05 23    Sleep apnea     has inspire    Squamous cell skin cancer 2007    left cheek     Stroke (HCC)     Swollen  ankles     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, left     great toe fusion    Urinary incontinence     Urinary tract infection 03/28/2022    Wears glasses         Past Surgical History:   Procedure Laterality Date    ABDOMINAL SURGERY  1997,2015,1972    Nissen x2 1972 tubal ligarion    ARTHROSCOPY KNEE Right     BREAST BIOPSY      stereotactic-benign    BREAST BIOPSY      stereo-benign    BREAST CYST EXCISION Bilateral     benign    BREAST EXCISIONAL BIOPSY      unknown date-benign    BREAST EXCISIONAL BIOPSY      unknown date-benign    BREAST EXCISIONAL BIOPSY      unknown date-benign    BREAST EXCISIONAL BIOPSY      unknown age-benign    CARDIAC CATHETERIZATION      CHOLECYSTECTOMY      COLONOSCOPY      EXAMINATION UNDER ANESTHESIA N/A 06/24/2021    Procedure: EXAM UNDER ANESTHESIA (EUA), DISE;  Surgeon: Gregory Maier MD;  [facility]: [facility];  Service: ENT    HIATAL HERNIA REPAIR      KNEE SURGERY Right     Torn maniscus lap surg    LIPOSUCTION      LYMPH NODE BIOPSY      MOHS SURGERY  05/20/2021    left mid forehead-Mickey    MOHS SURGERY Left 05/27/2021    Left nasal tip- mickey     ND LIGATION/BIOPSY TEMPORAL ARTERY Left 01/05/2023    Procedure: BIOPSY ARTERY TEMPORAL;  Surgeon: Alec Dennis DO;  Location: [facility];  Service: Vascular    ND OPEN IMPLANTATION CRANIAL NERVE BOOM & PULSE GEN N/A 11/10/2021    Procedure: INSERTION UPPER AIRWAY STIMULATOR, INSPIRE IMPLANT;  Surgeon: Gregory Maier MD;  Location: [facility];  Service: ENT    ND UNLISTED PROCEDURE FOOT/TOES Right 07/19/2022    Procedure: 1st metatarsal phalangeal joint fusion;  Surgeon: Dede Bonner DPM;  Location: [facility];  Service: Podiatry    REDUCTION MAMMAPLASTY Bilateral 2000    REDUCTION MAMMAPLASTY      SKIN BIOPSY      SKIN CANCER EXCISION  2007    squamous cell carcinoma     SKIN CANCER EXCISION  2007    basal cell carcinoma    SKIN CANCER EXCISION  2023    SCCIS chest    SQUAMOUS CELL CARCINOMA EXCISION      TOE SURGERY  Right 2022    Right Great Toe Fusion    TONSILLECTOMY      TUBAL LIGATION      UPPER GASTROINTESTINAL ENDOSCOPY      US GUIDED BREAST BIOPSY RIGHT COMPLETE Right 2022        Family History   Problem Relation Age of Onset    Heart disease Mother     Depression Mother     Hypertension Mother     COPD Mother     Hearing loss Mother     Anxiety disorder Mother     Heart disease Father     Lung cancer Father 70        Smoker     Cancer Father         brain    Alcohol abuse Father     Dementia Father     Hypertension Father     Thyroid disease Father     COPD Father     Arthritis Father     Brain cancer Father 74    Hypertension Sister     Diabetes Sister     Heart disease Sister     Thyroid disease Sister     Cancer Sister         Lympoma, lung    Lung cancer Sister 79    Anxiety disorder Sister     Migraines Sister     Hypertension Brother     Diabetes Brother     Cancer Brother         Throat    Dementia Brother     Stroke Brother     Hypertension Brother     Heart disease Brother     Diabetes Brother     Hypertension Brother     No Known Problems Son     No Known Problems Son     Brain cancer Paternal Aunt         unknown age    Diabetes Sister     Hypertension Sister     Diabetes Brother     Breast cancer Neg Hx         Social History     Socioeconomic History    Marital status: /Civil Union     Spouse name: Not on file    Number of children: Not on file    Years of education: Not on file    Highest education level: Not on file   Occupational History    Occupation: RETIRED   Tobacco Use    Smoking status: Former     Current packs/day: 0.00     Average packs/day: 2.0 packs/day for 10.0 years (20.0 ttl pk-yrs)     Types: Cigarettes     Start date: 1966     Quit date: 1976     Years since quittin.3     Passive exposure: Past    Smokeless tobacco: Never    Tobacco comments:     Smoked 2 pack a day   Vaping Use    Vaping status: Never Used   Substance and Sexual Activity    Alcohol use: Yes      Comment: 1 or 2 a year    Drug use: No    Sexual activity: Not Currently     Partners: Male     Birth control/protection: Abstinence, Post-menopausal, Female Sterilization     Comment: defer   Other Topics Concern    Not on file   Social History Narrative    ** Merged History Encounter **          Social Determinants of Health     Financial Resource Strain: Low Risk  (11/10/2023)    Received from [facility]    Overall Financial Resource Strain (CARDIA)     Difficulty of Paying Living Expenses: Not very hard   Food Insecurity: No Food Insecurity (11/10/2023)    Received from [facility]    Hunger Vital Sign     Worried About Running Out of Food in the Last Year: Never true     Ran Out of Food in the Last Year: Never true   Transportation Needs: No Transportation Needs (11/10/2023)    Received from [facility]    PRAPARE - Transportation     Lack of Transportation (Medical): No     Lack of Transportation (Non-Medical): No   Physical Activity: Inactive (11/10/2023)    Received from [facility]    Exercise Vital Sign     Days of Exercise per Week: 0 days     Minutes of Exercise per Session: 0 min   Stress: Stress Concern Present (11/10/2023)    Received from [facility]    [employer] [place] [employer] - Occupational Stress Questionnaire     Feeling of Stress : To some extent   Social Connections: Moderately Integrated (11/10/2023)    Received from [facility]    Social Connection and Isolation Panel [NHANES]     Frequency of Communication with Friends and Family: More than three times a week     Frequency of Social Gatherings with Friends and Family: Twice a week     Attends Alevism Services: More than 4 times per year     Active Member of Clubs or Organizations: No     Attends Club or Organization Meetings: Never     Marital Status:    Intimate Partner Violence: Not At Risk (11/10/2023)     "Received from [facility]    Humiliation, Afraid, Rape, and Kick questionnaire     Fear of Current or Ex-Partner: No     Emotionally Abused: No     Physically Abused: No     Sexually Abused: No   Housing Stability: Low Risk  (11/10/2023)    Received from [facility]    Housing Stability Vital Sign     Unable to Pay for Housing in the Last Year: No     Number of Places Lived in the Last Year: 1     Unstable Housing in the Last Year: No        Physical Exam:      /74 (BP Location: Right arm, Patient Position: Sitting, Cuff Size: Large)   Pulse (!) 53   Temp 98 °F (36.7 °C)   Resp 20   Ht 5' 4\" (1.626 m)   Wt 73.5 kg (162 lb)   SpO2 97%   BMI 27.81 kg/m²     Physical Exam      Data:     Pre-operative work-up    Laboratory Results: I have personally reviewed the pertinent laboratory results/reports     EKG: I have personally reviewed pertinent reports.        Previous cardiopulmonary studies within the past year:  Echocardiogram:  Left Ventricle: Left ventricular cavity size is normal. Wall thickness is normal. The left ventricular ejection fraction is 55%. Systolic function is normal. Wall motion is normal. Diastolic function is mildly abnormal, consistent with grade I (abnormal) relaxation.    Mitral Valve: There is mild regurgitation.    Tricuspid Valve: There is mild regurgitation.       Assessment:     1. Pre-op evaluation  Covid Vaccine at Practice      2. Essential hypertension  hydrALAZINE (APRESOLINE) 25 mg tablet    Covid Vaccine at Practice      3. Parenchymal renal hypertension, stage 1 through stage 4 or unspecified chronic kidney disease  hydrALAZINE (APRESOLINE) 25 mg tablet      4. Secondary hyperparathyroidism of renal origin (HCC)  hydrALAZINE (APRESOLINE) 25 mg tablet      5. Need for COVID-19 vaccine  Covid Vaccine at Practice    COVID-19 Pfizer mRNA vaccine 12 yr and older (GRAY cap vial or pre-filled syringe)           Plan:     74 y.o. female with " planned surgery: cataract surgery.      Cardiac Risk Estimation: per the Revised Cardiac Risk Index (Circ. 100:1043, 1999), the patient's risk factors for cardiac complications include history of cerebrovascular disease, history of congestive heart failure, on insulin, and serum Cr > 2.0 mg/dL, putting her in: RCI RISK CLASS IV (> 2 risk factors, risk of major cardiac compl. appr. 9.1%).    1. Further preoperative workup as follows:   - None; no further preoperative work-up is required    2. Medication Management/Recommendations:   - Patient has been instructed to avoid herbs or non-directed vitamins the week prior to surgery to ensure no drug interactions with perioperative surgical and anesthetic medications.  - Patient should continue antihypertensive medications up through and including the day of surgery.   - Patient should continue beta-blocker medication up through and including the day of surgery.  - Patient should continue his statin medication up through and including the day of surgery.  -Hold insulin the day of the surgery if fasting    3. Prophylaxis for cardiac events with perioperative beta-blockers: patient is on beta-blocker.    4. Patient requires further consultation with: None    Clearance  Patient is medically optimized for above planned surgery     Maryse Rae MD  [facility] [place]  [address] [facility] [address]  Phone#  [phone]  Fax#  [phone]

## 2024-05-01 PROBLEM — D63.1 ANEMIA IN STAGE 5 CHRONIC KIDNEY DISEASE, NOT ON CHRONIC DIALYSIS  (HCC): Status: ACTIVE | Noted: 2024-05-01

## 2024-05-01 PROBLEM — N18.4 ANEMIA IN STAGE 4 CHRONIC KIDNEY DISEASE  (HCC): Status: RESOLVED | Noted: 2023-09-19 | Resolved: 2024-05-01

## 2024-05-01 PROBLEM — N18.4 CKD (CHRONIC KIDNEY DISEASE) STAGE 4, GFR 15-29 ML/MIN (HCC): Status: RESOLVED | Noted: 2023-10-27 | Resolved: 2024-05-01

## 2024-05-01 PROBLEM — N18.5 ANEMIA IN STAGE 5 CHRONIC KIDNEY DISEASE, NOT ON CHRONIC DIALYSIS  (HCC): Status: ACTIVE | Noted: 2024-05-01

## 2024-05-01 PROBLEM — D63.1 ANEMIA IN STAGE 4 CHRONIC KIDNEY DISEASE  (HCC): Status: RESOLVED | Noted: 2023-09-19 | Resolved: 2024-05-01

## 2024-05-01 PROBLEM — E11.21 DIABETIC NEPHROPATHY ASSOCIATED WITH TYPE 2 DIABETES MELLITUS (HCC): Status: ACTIVE | Noted: 2024-05-01

## 2024-05-01 PROBLEM — N18.5 CKD (CHRONIC KIDNEY DISEASE) STAGE 5, GFR LESS THAN 15 ML/MIN (HCC): Status: ACTIVE | Noted: 2024-05-01

## 2024-05-01 NOTE — PROGRESS NOTES
RENAL FOLLOW UP NOTE:.td    ASSESSMENT AND PLAN:  1.  CKD stage 5.:  Etiology:  Diabetic nephropathy/hypertensive nephrosclerosis/arteriolar nephrosclerosis/chronic NSAID use.  No evidence of obstructive uropathy, renal artery disease or primary glomerular disease with a bland urinalysis  Of note, CPK was normal at 105 no evidence rhabdomyolysis.  Baseline creatinine: Most recently 2.7-3.5  Current creatinine 2.43 back to baseline  Urine protein creatinine ratio: 1.40 improved  UA demonstrated no significant hematuria but 3+ proteinuria from  Serologies:  Normal C3/C4; negative rheumatoid factor; negative SPEP:  Negative UPEP;  Light chain ratio 2.09 essentially normal for chronic kidney disease  negative ELOY; negative hepatitis-C; normal IgA; normal ASO; negative RPR    Renal ultrasound no hydronephrosis small bilateral renal simple cyst left upper pole nonobstructing calculi okay      Recommendations:  Treat hypertension-please see below  Treat dyslipidemia-please see below  Maintain proteinuria less than 1 g or as low as possible: Adjust antihypertensive regimen in order to try to get to proteinuria less than 1 g please see below  Avoid nephrotoxic agents such as NSAIDs, patient counseled as such  S/p kidney smart  ACP meeting completed no kidney dialysis whatsoever at this juncture if it were to come to that!     2.  Volume:   Current status:  Euvolemic   Torsemide dose: Currently off torsemide    3.  Hypertension:  Workup:  saline suppression test for primary aldosterone state was normal  plasma free metanephrines was negative  renal artery duplex was negative 02/2019       Current blood pressure averages:   Accurate they were taken after medications        Goal blood pressure:  less than 125/75 given less than 1 g of proteinuria at this time  Recommendations:  Push nonmedical regimen including weight loss, isotonic exercise and avoidance of salt; patient counseled as such  Medication changes today:   I am  going to add torsemide 10 mg daily back for the blood pressure the potassium may help with proteinuria but will monitor kidney function closely  Next step would be either to add back a very small dose of losartan if the potassium can tolerate it versus increasing hydralazine    4.  Electrolytes:   Mild metabolic acidosis from renal disease doing well at 26  Hyperkalemia now better at 4.5 low potassium diet    5.  Mineral bone disorder:  Calcium/magnesium/phosphorus:  All acceptable,   PTH intact: 34.4 at goal from 4/1/2024  Vitamin-D: 30.1 from 4/1/2024 at goal    6.  Dyslipidemia:  Goal LDL:  Less than 100  Current lipid profile: LDL 25/HDL 34/triglycerides 331 from 4/8/24  Recommendations:  Per Endocrinology but essentially at goal      7.  Anemia:   Current hemoglobin 11.2 stablel  Check iron studies patient followed closely by GI as well    8.  Other problems:  Depression  Diabetes mellitus per primary medical physician: I would avoid SGLT2 inhibitors at the moment with frequent urinary tract infections  Hypothyroidism  SHAYAN on CPAP  Fibromyalgia/osteoarthritis:  Patient with myoclonic movements, seems related to gabapentin it was adjusted with resolution.  Nephrolithiasis  Basal cell/squamous CA of the skin  Urinary stress incontinence  Status post incisional hernia repairs  hospitalization as outlined below from severe GERD with hypoxemia and shortness of breath from a failed Nissen fundoplication from prior hiatal hernia repair.  Patient is due to have further testing with an EGD by GI and then subsequently thoracic surgery consultation for possible repair(however, according to the patient at this juncture she was felt I risk so no surgery is planned at this time)  In addition, she was placed on Protonix 40 b.i.d. and Pepcid 40 mg hs  Hospitalization on 07/14/2020 secondary confusion at home.  Apparently she had protracted hospital course in [place] following aspiration pneumonia.  Ten days in the ICU  on a ventilator.  No overt infectious process.  Low probability for pulmonary embolus despite an elevated D-dimer.  Had mild leukocytosis/SIRS criteria subsequently discharged 1 day later when she clinically improved.  Symptoms were felt secondary to her protracted ICU course.   hospitalization with discharge on 05/18/21:  Epigastric pain following an EGD on 05/13/2021 for Botox injection at the pylorus for treatment for gastroparesis.  Apparently associated with chocolate ingestion and possible cough with aspiration  Complicated by NIKOS with creatinine to 2.6 which improved with IV fluids TO 1.92 ON 05/18/2021   Hospitalization 04/13/2022:  With hypotension fatigue noted to have a low blood pressure mild increasing creatinine, dysuria treated with Bactrim but had an allergic reaction to it as an outpatient she received intravenous ceftriaxone and she was discharged on doxycycline.  Recently placed on spironolactone for blood pressure all medications were held decide from metoprolol.  Amlodipine was resumed, torsemide re-initiated, but spironolactone was stopped.  Maintain off hydralazine possibly add olmesartan  Creatinine initially as high as 2.27 reduce down to 2.16 upon discharge  Patient admitted 1/7/2023 secondary headache and confusion left temporal symptoms and temporal artery tenderness with an elevated ESR started empirically on steroids per neurology recommendation status post temporal artery biopsy.  However she developed a vesicular rash felt to have herpes zoster ophthalmologists started on Valtrex seen by ophthalmology eyedrops also initiated.  Patient discharged on Valtrex and high-dose gabapentin with follow-up with her primary care physician.  Patient with leukocytosis most compatible with steroids  Admitted 8/19/2023 after recent UTI history of urinary retention and straight catheterizes herself: Admitted with NIKOS from poor oral intake UTI with Serratia given IV fluids placed on ertapenem/change in  mental status noted.  Urinary straight catheterizations recommended to be increased by urology.  Torsemide 5 mg every other day.  Admitted 9/9 with change in mental status status post recent discharge earlier in September with UTI.  Seen by geriatrics recommended formal cognitive testing as an outpatient.  ([place] multifactorial from chronic medical conditions?  Underlying dementia): Patient's course complicated by acute kidney injury with a creatinine of 2.5 on admission diuretics were held gentle IV fluids creatinine back to baseline 2.07  Admitted 9/12/2023: COPD exacerbation treated with steroids nebulizers and oxygen.           GI health maintenance:  Last colonoscopy: 7/30/2021: By Dr. Osmin Mosqueda  recommended follow-up in 3 years which would be July 2024 patient has an appointment May 29    PATIENT INSTRUCTIONS:    Patient Instructions   Visit summary:  - Overall kidney function is stable!  -Your blood pressure is elevated you have perhaps slight fluid retention as we discussed so I am going to add back a very small dose of water pill torsemide please see below    -As always avoid salt        1. Medication changes today:  Please begin torsemide 10 mg daily in the morning no other medication changes    2.  General instructions:  Please avoid salt as outlined above  Try to stay as active as possible    3.  Please go for non fasting  lab work 1-2 weeks after making the above medication change.    4.  Please take 1 week a blood pressure readings 2 weeks after starting the torsemide but please remember to do it before taking your blood pressure pills not after    AS FOLLOWS  MORNING AND EVENING, SITTING AND STANDING as follows:  TAKE THE MORNING READINGS BEFORE ANY MEDICATIONS AND WHEN YOU ARE RELAXED FOR SEVERAL MINUTES  TAKE THE EVENING READINGS:  BETWEEN 7-10 P.M.; PRIOR TO ANY MEDICATIONS; AT LEAST IN OUR  FROM DINNER; AND CERTAINLY AFTER RELAXING FOR A FEW MINUTES  PLEASE INCLUDE HEART RATE WITH  YOUR BLOOD PRESSURE READINGS  When taking standing readings, keep your arm supported at heart level and not dangling  Make sure you are sitting with your back supported and feet on the ground and do not cross your legs or feet  Make sure you have not taken any coffee or caffeine products or exercised or smoke cigarettes at least 30 minutes before taking your blood pressure  Then please mail these readings into the office            5.  Follow-up in 4 months  Please bring in 1 week a blood pressure readings morning evening, sitting and standing is outlined above  PLEASE BRING AN YOUR BLOOD PRESSURE MACHINE TO CORRELATE WITH THE OFFICE MACHINE AT THIS NEXT SCHEDULED VISIT  Please go for fasting lab work 1-2 weeks prior to your appointment      6.  General non medical recommendations:  AVOID SALT BUT NOT ADDING AN READING LABELS TO MAKE SURE THERE IS LOW-SALT IN THE FOOD THAT YOU ARE EATING  Goal is less than 2 g of sodium intake or less than 5 g of sodium chloride intake per day    Avoid nonsteroidal anti-inflammatory drugs such as Naprosyn, ibuprofen, Aleve, Advil, Celebrex, Meloxicam (Mobic) etc.  You can use Tylenol as needed if you do not have any liver condition to be concerned about    Avoid medications such as Sudafed or decongestants and antihistamines that contained the D component which is the decongestant.  You can take antihistamines without the decongestant or D component.    Try to avoid medications such as pantoprazole or  Protonix/Nexium or Esomeprazole)/Prilosec or omeprazole/Prevacid or lansoprazole/AcipHex or Rabeprazole.  If you are able to, use Pepcid as this is safer for your kidneys.    Try to exercise at least 30 minutes 3 days a week to begin with with an ultimate goal of 5 days a week for at least 30 minutes    Please do not drink more than 2 glasses of alcohol/wine on a daily basis as this may contribute to your high blood pressure.            Subjective:   There has been no hospitalizations  "or acute illnesses since last visit.  The patient overall is feeling well.  No fevers, chills, or cough or colds; just allergy symptoms  Good appetite and fair to good energy  No hematuria, dysuria, voiding symptoms or foamy urine  No gastrointestinal symptoms  No chest pain, some mild dyspnea on exertion, unchanged some leg swelling at nighttime, perhaps slightly more noted worse with salty food  No headaches, dizziness or lightheadedness  Blood pressure medications:  Metoprolol tartrate 50 mg twice a day  Hydralazine 25 mg twice a day  Amlodipine 5 mg twice a day just increased over the last couple of weeks    Renal pertinent medications:  Vitamin D 2000 units daily-calcium 500 mg daily  Rosuvastatin 5 mg daily icosapent 2 capsules twice a day/co-Q10 100 mg daily  Protonix 40 mg twice a day  Aspirin 81 mg daily    ROS:  See HPI, otherwise review of systems as completely reviewed with the patient are negative    Past Medical History:   Diagnosis Date    Actinic keratosis     Acute cystitis without hematuria 04/28/2023    Acute cystitis without hematuria 04/28/2023    Acute respiratory failure with hypoxia (HCC)     Acute-on-chronic kidney injury  (HCC)     NIKOS (acute kidney injury) (HCC) 05/08/2020    Allergic     Allergic rhinitis     Ambulatory dysfunction 10/22/2020    AMS (altered mental status) 07/14/2020    Anesthesia     pt reports \"had to use double lumen endo tube for hiatal hernia repair/so surgeon could get to where he needed to work\"    Arthritis     Aspiration into airway     Michael esophagus 1993    Lot of stress with children    Basal cell carcinoma 2007    left cheek     BCC (basal cell carcinoma) 05/27/2021    Left Nasal tip    Breast discharge 06/19/2023    Breast pain 06/19/2023    Cancer (HCC)     squamous cell cancer on forhead    Cancer (HCC)     basal cell on nose     Cholelithiasis     Chronic kidney disease 2000, 2018    Stones, kidney disease stage 4    Chronic pain disorder     bilat " feet and joint pain on occas    Colon polyp     Confusion 10/30/2023    Constipation     COPD (chronic obstructive pulmonary disease) (HCC)     COVID-19 08/2021    recovered at home/did receive monoclonal infusion    COVID-19 virus infection 08/16/2021    Dental bridge present     Depression     Diabetes mellitus (HCC)     Type 2    Diabetic neuropathy (HCC)     Difficulty walking 2017    Disease of thyroid gland     Dyspnea     Elevated liver enzymes 04/04/2023    Epigastric abdominal pain with severe diabetic gastroparesis, status post EGD/Botox injection, 5/14 05/17/2021    Facial abrasion, initial encounter 03/22/2023    Fall 03/22/2023    Family history of thyroid problem     Fatty liver     Fibromyalgia, primary     Fracture of orbital floor, blow-out, right, closed, with routine healing, subsequent encounter 03/22/2023    GERD (gastroesophageal reflux disease)     Heart burn     Hiatal hernia     History of cholecystectomy 10/27/2023    History of colon polyps 07/30/2021    History of kidney stones 09/29/2020    Hx of recurrent kidney stones    History of kidney stones 09/29/2020    Hx of recurrent kidney stones  Formatting of this note might be different from the original. Hx of recurrent kidney stones  Last Assessment & Plan:  Formatting of this note might be different from the original. We will set her up for follow-up in 3 months with a KUB prior.  She knows to call if she has any kidney stone type pain in the meantime.    History of Nissen fundoplication 10/27/2023    History of pneumonia     History of repair of hiatal hernia 05/03/2020    Hyperlipidemia     Hyperplasia, parathyroid (HCC)     Hypertension     Hypertensive urgency 10/27/2023    Hyponatremia 04/26/2023    Hypotension 04/13/2022    Kidney problem     Kidney stone     Left hip pain 10/05/2023    Leukocytosis 07/14/2020    Memory loss Julu2 2020    Motion sickness     Motion sickness     Multiple closed fractures of ribs of right side  03/22/2023    Nasal bones, closed fracture 03/22/2023    Neck pain     Obesity 1978    Obesity (BMI 30.0-34.9)     Other chest pain 09/13/2021    Other fatigue 09/21/2023    Peripheral neuropathy     Pollen allergies     RLS (restless legs syndrome)     S/P foot surgery 07/20/2022    SCC (squamous cell carcinoma) 05/04/2021    left mid forehead    SCC (squamous cell carcinoma) 2023    chest    Seasonal allergies     Shingles 01 05 23    Sleep apnea     has inspire    Squamous cell skin cancer 2007    left cheek     Stroke (HCC)     Swollen ankles     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, left     great toe fusion    Urinary incontinence     Urinary tract infection 03/28/2022    Wears glasses      Past Surgical History:   Procedure Laterality Date    ABDOMINAL SURGERY  1997,2015,1972    Nissen x2 1972 tubal ligarion    ARTHROSCOPY KNEE Right     BREAST BIOPSY      stereotactic-benign    BREAST BIOPSY      stereo-benign    BREAST CYST EXCISION Bilateral     benign    BREAST EXCISIONAL BIOPSY      unknown date-benign    BREAST EXCISIONAL BIOPSY      unknown date-benign    BREAST EXCISIONAL BIOPSY      unknown date-benign    BREAST EXCISIONAL BIOPSY      unknown age-benign    CARDIAC CATHETERIZATION      CHOLECYSTECTOMY      COLONOSCOPY      EXAMINATION UNDER ANESTHESIA N/A 06/24/2021    Procedure: EXAM UNDER ANESTHESIA (EUA), DISE;  Surgeon: Gregory Maier MD;  Location: [facility];  Service: ENT    HIATAL HERNIA REPAIR      KNEE SURGERY Right     Torn maniscus lap surg    LIPOSUCTION      LYMPH NODE BIOPSY      MOHS SURGERY  05/20/2021    left mid forehead-Mickey    MOHS SURGERY Left 05/27/2021    Left nasal tip- mickey     AL LIGATION/BIOPSY TEMPORAL ARTERY Left 01/05/2023    Procedure: BIOPSY ARTERY TEMPORAL;  Surgeon: Alec Dennis DO;  [facility];  Service: Vascular    AL OPEN IMPLANTATION CRANIAL NERVE BOOM & PULSE GEN N/A 11/10/2021    Procedure: INSERTION UPPER AIRWAY STIMULATOR, INSPIRE IMPLANT;   Surgeon: Gregory Maier MD;  Location: [facility];  Service: ENT    IN UNLISTED PROCEDURE FOOT/TOES Right 07/19/2022    Procedure: 1st metatarsal phalangeal joint fusion;  Surgeon: Dede Bonner DPM;  Location: [facility];  Service: Podiatry    REDUCTION MAMMAPLASTY Bilateral 2000    REDUCTION MAMMAPLASTY      SKIN BIOPSY      SKIN CANCER EXCISION  2007    squamous cell carcinoma     SKIN CANCER EXCISION  2007    basal cell carcinoma    SKIN CANCER EXCISION  2023    SCCIS chest    SQUAMOUS CELL CARCINOMA EXCISION      TOE SURGERY Right 08/04/2022    Right Great Toe Fusion    TONSILLECTOMY      TUBAL LIGATION      UPPER GASTROINTESTINAL ENDOSCOPY      US GUIDED BREAST BIOPSY RIGHT COMPLETE Right 07/18/2022     Family History   Problem Relation Age of Onset    Heart disease Mother     Depression Mother     Hypertension Mother     COPD Mother     Hearing loss Mother     Anxiety disorder Mother     Heart disease Father     Lung cancer Father 70        Smoker     Cancer Father         brain    Alcohol abuse Father     Dementia Father     Hypertension Father     Thyroid disease Father     COPD Father     Arthritis Father     Brain cancer Father 74    Hypertension Sister     Diabetes Sister     Heart disease Sister     Thyroid disease Sister     Cancer Sister         Lympoma, lung    Lung cancer Sister 79    Anxiety disorder Sister     Migraines Sister     Hypertension Brother     Diabetes Brother     Cancer Brother         Throat    Dementia Brother     Stroke Brother     Hypertension Brother     Heart disease Brother     Diabetes Brother     Hypertension Brother     No Known Problems Son     No Known Problems Son     Brain cancer Paternal Aunt         unknown age    Diabetes Sister     Hypertension Sister     Diabetes Brother     Breast cancer Neg Hx       reports that she quit smoking about 48 years ago. Her smoking use included cigarettes. She started smoking about 58 years ago. She has a 20 pack-year smoking  history. She has been exposed to tobacco smoke. She has never used smokeless tobacco. She reports current alcohol use. She reports that she does not use drugs.    I COMPLETELY REVIEWED THE PAST MEDICAL HISTORY/PAST SURGICAL HISTORY/SOCIAL HISTORY/FAMILY HISTORY/AND MEDICATIONS  AND UPDATED ALL    Objective:     Vitals:   BP sitting on right: 170/70 with a heart rate of 52 and regular same on left  BP standing on the right: 162/68 with a heart rate of 56 and regular    Weight (last 2 days)       Date/Time Weight    05/10/24 1252 75.3 (166)          Wt Readings from Last 3 Encounters:   05/10/24 75.3 kg (166 lb)   04/23/24 73.5 kg (162 lb)   03/14/24 71.3 kg (157 lb 3.2 oz)       Body mass index is 28.49 kg/m².    Physical Exam: General:  No acute distress  Skin:  No acute rash  Eyes:  No scleral icterus, noninjected, no discharge from eyes  ENT:  Moist mucous membranes  Neck:  Supple, no jugular venous distention, trachea is midline, no lymphadenopathy and no thyromegaly  Back   No CVAT  Chest:  Clear to auscultation and percussion, good respiratory effort  CVS:  Regular rate and rhythm without a rub, or gallops or murmurs  Abdomen:  obese,Soft and nontender with normal bowel sounds  Extremities:  No cyanosis and no edema, no arthritic changes, normal range of motion  Neuro:  Grossly intact  Psych:  Alert, oriented x3 and appropriate      Medications:    Current Outpatient Medications:     acetaminophen (TYLENOL) 500 mg tablet, Take 1,000 mg by mouth every 6 (six) hours as needed for mild pain, Disp: , Rfl:     albuterol (Ventolin HFA) 90 mcg/act inhaler, Inhale 2 puffs every 6 (six) hours as needed for wheezing, Disp: 54 g, Rfl: 0    amLODIPine (NORVASC) 5 mg tablet, Take 1 tablet (5 mg total) by mouth daily (Patient taking differently: Take 10 mg by mouth daily), Disp: 90 tablet, Rfl: 1    aspirin (ECOTRIN LOW STRENGTH) 81 mg EC tablet, Take 1 tablet (81 mg total) by mouth daily Do not start before October 31,  2023., Disp: , Rfl: 0    B-D ULTRAFINE III SHORT PEN 31G X 8 MM MISC, , Disp: , Rfl: 3    Calcium Citrate 250 MG TABS, TAKE 2 TABLETS (500MG) 2 TIMES A DAY (Patient taking differently: Take 500 mg by mouth in the morning), Disp: 360 tablet, Rfl: 1    Coenzyme Q10 (CoQ10) 100 MG CAPS, Take 100 mg by mouth in the morning Bed time, Disp: , Rfl:     Cyanocobalamin (Vitamin B12) 1000 MCG TBCR, Take 1,000 mcg by mouth once a week, Disp: , Rfl:     escitalopram (LEXAPRO) 10 mg tablet, Take 10 mg by mouth daily, Disp: , Rfl:     famotidine (PEPCID) 10 mg tablet, Take 1 tablet (10 mg total) by mouth daily Do not start before September 13, 2023., Disp: 30 tablet, Rfl: 0    hydrALAZINE (APRESOLINE) 25 mg tablet, Take 1 tablet (25 mg total) by mouth 2 (two) times a day, Disp: , Rfl:     Icosapent Ethyl 1 g CAPS, Take 2 capsules (2 g total) by mouth 2 (two) times a day, Disp: 120 capsule, Rfl: 3    insulin aspart (NovoLOG) 100 units/mL injection, Inject under the skin 3 (three) times a day before meals 5units, 5units, 10units., Disp: , Rfl:     insulin detemir (Levemir FlexTouch) 100 Units/mL injection pen, Inject 20 Units under the skin every evening, Disp: , Rfl:     levothyroxine 100 mcg tablet, TAKE 1 TABLET BY MOUTH EVERY DAY (Patient taking differently: Take 124 mcg by mouth daily), Disp: 90 tablet, Rfl: 1    methenamine hippurate (HIPREX) 1 g tablet, Take 1 tablet (1 g total) by mouth 2 (two) times a day with meals, Disp: 180 tablet, Rfl: 3    metoprolol tartrate (LOPRESSOR) 50 mg tablet, Take 1 tablet (50 mg total) by mouth every 12 (twelve) hours, Disp: 180 tablet, Rfl: 3    NON FORMULARY, , Disp: , Rfl:     pantoprazole (PROTONIX) 40 mg tablet, Take 1 tablet (40 mg total) by mouth 2 (two) times a day, Disp: 180 tablet, Rfl: 1    pramipexole (MIRAPEX) 0.25 mg tablet, Take 1 tablet (0.25 mg total) by mouth daily at bedtime, Disp: 90 tablet, Rfl: 0    Probiotic Product (PROBIOTIC BLEND PO), Take by mouth, Disp: , Rfl:      "Restasis 0.05 % ophthalmic emulsion, Administer 1 drop to both eyes every 12 (twelve) hours, Disp: , Rfl:     rosuvastatin (CRESTOR) 5 mg tablet, Take 1 tablet (5 mg total) by mouth daily, Disp: 90 tablet, Rfl: 3    torsemide (DEMADEX) 10 mg tablet, Take 1 tablet (10 mg total) by mouth daily, Disp: 30 tablet, Rfl: 5    Vitamin D, Cholecalciferol, 25 MCG (1000 UT) TABS, Take 2,000 Units by mouth in the morning, Disp: , Rfl:     Icosapent Ethyl (Vascepa) 0.5 g CAPS, Take 2 g by mouth 2 (two) times a day, Disp: 120 capsule, Rfl: 3    Icosapent Ethyl (Vascepa) 0.5 g CAPS, Take 0.5 mg by mouth 2 (two) times a day, Disp: 180 capsule, Rfl: 3    Lab, Imaging and other studies: I have personally reviewed pertinent labs.  Laboratory Results:  Results for orders placed or performed in visit on 05/06/24   TSH, 3rd generation   Result Value Ref Range    TSH 3RD GENERATON 3.821 0.450 - 4.500 uIU/mL     *Note: Due to a large number of results and/or encounters for the requested time period, some results have not been displayed. A complete set of results can be found in Results Review.       Results from last 7 days   Lab Units 05/06/24  0714   POTASSIUM mmol/L 4.5   CHLORIDE mmol/L 107   CO2 mmol/L 26   BUN mg/dL 43*   CREATININE mg/dL 2.43*   CALCIUM mg/dL 8.6         Radiology review:   chest X-ray    Ultrasound      Portions of the record may have been created with voice recognition software.  Occasional wrong word or \"sound a like\" substitutions may have occurred due to the inherent limitations of voice recognition software.  Read the chart carefully and recognize, using context, where substitutions have occurred.                    "

## 2024-05-02 DIAGNOSIS — E03.9 ACQUIRED HYPOTHYROIDISM: Chronic | ICD-10-CM

## 2024-05-02 RX ORDER — LEVOTHYROXINE SODIUM 0.1 MG/1
100 TABLET ORAL DAILY
Qty: 90 TABLET | Refills: 1 | Status: SHIPPED | OUTPATIENT
Start: 2024-05-02

## 2024-05-03 ENCOUNTER — TELEPHONE (OUTPATIENT)
Age: 74
End: 2024-05-03

## 2024-05-03 NOTE — TELEPHONE ENCOUNTER
She can try 5 mg bid--however if blood pressure still elevated have her come in to the office Monday 5/6

## 2024-05-03 NOTE — TELEPHONE ENCOUNTER
I called and spoke to patient , she will call us Monday and let us know if she needs to be seen . We can add her to schedule as she is scheduled for surgery next week.

## 2024-05-03 NOTE — TELEPHONE ENCOUNTER
Patient called she has increased her Amlodipine 10 mg in the evening and increased hydralazine to BID.  Her BP has been around 165/84 in the mornings and goes to around 140/80 in the evenings per patient.  Patient is scheduled for cataract surgery 5/8/24 and is worried they will not perform the procedure because of her BP.  Patient is asking if she should take Amlodipine 5 mg in the morning and 5 mg in the evening.      Please advise    Thank you

## 2024-05-06 ENCOUNTER — LAB (OUTPATIENT)
Dept: LAB | Facility: AMBULARY SURGERY CENTER | Age: 74
End: 2024-05-06
Payer: MEDICARE

## 2024-05-06 DIAGNOSIS — E03.9 ACQUIRED HYPOTHYROIDISM: Chronic | ICD-10-CM

## 2024-05-06 LAB
ANION GAP SERPL CALCULATED.3IONS-SCNC: 7 MMOL/L (ref 4–13)
BUN SERPL-MCNC: 43 MG/DL (ref 5–25)
CALCIUM SERPL-MCNC: 8.6 MG/DL (ref 8.4–10.2)
CHLORIDE SERPL-SCNC: 107 MMOL/L (ref 96–108)
CO2 SERPL-SCNC: 26 MMOL/L (ref 21–32)
CREAT SERPL-MCNC: 2.43 MG/DL (ref 0.6–1.3)
GFR SERPL CREATININE-BSD FRML MDRD: 18 ML/MIN/1.73SQ M
GLUCOSE P FAST SERPL-MCNC: 93 MG/DL (ref 65–99)
POTASSIUM SERPL-SCNC: 4.5 MMOL/L (ref 3.5–5.3)
SODIUM SERPL-SCNC: 140 MMOL/L (ref 135–147)
TSH SERPL DL<=0.05 MIU/L-ACNC: 3.82 UIU/ML (ref 0.45–4.5)

## 2024-05-06 PROCEDURE — 80048 BASIC METABOLIC PNL TOTAL CA: CPT

## 2024-05-06 PROCEDURE — 84443 ASSAY THYROID STIM HORMONE: CPT

## 2024-05-10 ENCOUNTER — OFFICE VISIT (OUTPATIENT)
Dept: NEPHROLOGY | Facility: CLINIC | Age: 74
End: 2024-05-10
Payer: MEDICARE

## 2024-05-10 VITALS — BODY MASS INDEX: 28.34 KG/M2 | WEIGHT: 166 LBS | HEIGHT: 64 IN

## 2024-05-10 DIAGNOSIS — E78.2 MIXED HYPERLIPIDEMIA: ICD-10-CM

## 2024-05-10 DIAGNOSIS — N18.5 ANEMIA IN STAGE 5 CHRONIC KIDNEY DISEASE, NOT ON CHRONIC DIALYSIS  (HCC): ICD-10-CM

## 2024-05-10 DIAGNOSIS — E11.21 DIABETIC NEPHROPATHY ASSOCIATED WITH TYPE 2 DIABETES MELLITUS (HCC): ICD-10-CM

## 2024-05-10 DIAGNOSIS — I12.0 PARENCHYMAL RENAL HYPERTENSION, STAGE 5 CHRONIC KIDNEY DISEASE OR END STAGE RENAL DISEASE (HCC): Primary | ICD-10-CM

## 2024-05-10 DIAGNOSIS — N18.5 CKD (CHRONIC KIDNEY DISEASE) STAGE 5, GFR LESS THAN 15 ML/MIN (HCC): ICD-10-CM

## 2024-05-10 DIAGNOSIS — N25.81 SECONDARY HYPERPARATHYROIDISM OF RENAL ORIGIN (HCC): Chronic | ICD-10-CM

## 2024-05-10 DIAGNOSIS — E55.9 VITAMIN D DEFICIENCY: Chronic | ICD-10-CM

## 2024-05-10 DIAGNOSIS — D63.1 ANEMIA IN STAGE 5 CHRONIC KIDNEY DISEASE, NOT ON CHRONIC DIALYSIS  (HCC): ICD-10-CM

## 2024-05-10 PROCEDURE — G2211 COMPLEX E/M VISIT ADD ON: HCPCS | Performed by: INTERNAL MEDICINE

## 2024-05-10 PROCEDURE — 99214 OFFICE O/P EST MOD 30 MIN: CPT | Performed by: INTERNAL MEDICINE

## 2024-05-10 RX ORDER — MULTIVIT-MIN/IRON/FOLIC ACID/K 18-600-40
2000 CAPSULE ORAL DAILY
COMMUNITY

## 2024-05-10 RX ORDER — ESCITALOPRAM OXALATE 10 MG/1
10 TABLET ORAL DAILY
COMMUNITY

## 2024-05-10 RX ORDER — TORSEMIDE 10 MG/1
10 TABLET ORAL DAILY
Qty: 30 TABLET | Refills: 5 | Status: SHIPPED | OUTPATIENT
Start: 2024-05-10

## 2024-05-10 RX ORDER — ACETAMINOPHEN 500 MG
1000 TABLET ORAL EVERY 6 HOURS PRN
COMMUNITY

## 2024-05-10 NOTE — PATIENT INSTRUCTIONS
Visit summary:  - Overall kidney function is stable!  -Your blood pressure is elevated you have perhaps slight fluid retention as we discussed so I am going to add back a very small dose of water pill torsemide please see below    -As always avoid salt        1. Medication changes today:  Please begin torsemide 10 mg daily in the morning no other medication changes    2.  General instructions:  Please avoid salt as outlined above  Try to stay as active as possible    3.  Please go for non fasting  lab work 1-2 weeks after making the above medication change.    4.  Please take 1 week a blood pressure readings 2 weeks after starting the torsemide but please remember to do it before taking your blood pressure pills not after    AS FOLLOWS  MORNING AND EVENING, SITTING AND STANDING as follows:  TAKE THE MORNING READINGS BEFORE ANY MEDICATIONS AND WHEN YOU ARE RELAXED FOR SEVERAL MINUTES  TAKE THE EVENING READINGS:  BETWEEN 7-10 P.M.; PRIOR TO ANY MEDICATIONS; AT LEAST IN OUR  FROM DINNER; AND CERTAINLY AFTER RELAXING FOR A FEW MINUTES  PLEASE INCLUDE HEART RATE WITH YOUR BLOOD PRESSURE READINGS  When taking standing readings, keep your arm supported at heart level and not dangling  Make sure you are sitting with your back supported and feet on the ground and do not cross your legs or feet  Make sure you have not taken any coffee or caffeine products or exercised or smoke cigarettes at least 30 minutes before taking your blood pressure  Then please mail these readings into the office            5.  Follow-up in 4 months  Please bring in 1 week a blood pressure readings morning evening, sitting and standing is outlined above  PLEASE BRING AN YOUR BLOOD PRESSURE MACHINE TO CORRELATE WITH THE OFFICE MACHINE AT THIS NEXT SCHEDULED VISIT  Please go for fasting lab work 1-2 weeks prior to your appointment      6.  General non medical recommendations:  AVOID SALT BUT NOT ADDING AN READING LABELS TO MAKE SURE THERE IS  LOW-SALT IN THE FOOD THAT YOU ARE EATING  Goal is less than 2 g of sodium intake or less than 5 g of sodium chloride intake per day    Avoid nonsteroidal anti-inflammatory drugs such as Naprosyn, ibuprofen, Aleve, Advil, Celebrex, Meloxicam (Mobic) etc.  You can use Tylenol as needed if you do not have any liver condition to be concerned about    Avoid medications such as Sudafed or decongestants and antihistamines that contained the D component which is the decongestant.  You can take antihistamines without the decongestant or D component.    Try to avoid medications such as pantoprazole or  Protonix/Nexium or Esomeprazole)/Prilosec or omeprazole/Prevacid or lansoprazole/AcipHex or Rabeprazole.  If you are able to, use Pepcid as this is safer for your kidneys.    Try to exercise at least 30 minutes 3 days a week to begin with with an ultimate goal of 5 days a week for at least 30 minutes    Please do not drink more than 2 glasses of alcohol/wine on a daily basis as this may contribute to your high blood pressure.

## 2024-05-10 NOTE — LETTER
May 10, 2024     Tree Aguilar MD  [address] [facility] [address]    Patient: Trina Davis   YOB: 1950   Date of Visit: 5/10/2024       Dear Dr. Aguilar:    Thank you for referring Trina Davis to me for evaluation. Below are my notes for this consultation.    If you have questions, please do not hesitate to call me. I look forward to following your patient along with you.         Sincerely,        Donaldo Baires MD        CC: No Recipients    Donaldo Baires MD  5/10/2024  1:26 PM  Sign when Signing Visit  RENAL FOLLOW UP NOTE:.td    ASSESSMENT AND PLAN:  1.  CKD stage 5.:  Etiology:  Diabetic nephropathy/hypertensive nephrosclerosis/arteriolar nephrosclerosis/chronic NSAID use.  No evidence of obstructive uropathy, renal artery disease or primary glomerular disease with a bland urinalysis  Of note, CPK was normal at 105 no evidence rhabdomyolysis.  Baseline creatinine: Most recently 2.7-3.5  Current creatinine 2.43 back to baseline  Urine protein creatinine ratio: 1.40 improved  UA demonstrated no significant hematuria but 3+ proteinuria from  Serologies:  Normal C3/C4; negative rheumatoid factor; negative SPEP:  Negative UPEP;  Light chain ratio 2.09 essentially normal for chronic kidney disease  negative ELOY; negative hepatitis-C; normal IgA; normal ASO; negative RPR    Renal ultrasound no hydronephrosis small bilateral renal simple cyst left upper pole nonobstructing calculi okay      Recommendations:  Treat hypertension-please see below  Treat dyslipidemia-please see below  Maintain proteinuria less than 1 g or as low as possible: Adjust antihypertensive regimen in order to try to get to proteinuria less than 1 g please see below  Avoid nephrotoxic agents such as NSAIDs, patient counseled as such  S/p kidney smart  ACP meeting completed no kidney dialysis whatsoever at this juncture if it were to come to that!     2.  Volume:   Current status:  Euvolemic   Torsemide dose: Currently off  torsemide    3.  Hypertension:  Workup:  saline suppression test for primary aldosterone state was normal  plasma free metanephrines was negative  renal artery duplex was negative 02/2019       Current blood pressure averages:   Accurate they were taken after medications        Goal blood pressure:  less than 125/75 given less than 1 g of proteinuria at this time  Recommendations:  Push nonmedical regimen including weight loss, isotonic exercise and avoidance of salt; patient counseled as such  Medication changes today:   I am going to add torsemide 10 mg daily back for the blood pressure the potassium may help with proteinuria but will monitor kidney function closely  Next step would be either to add back a very small dose of losartan if the potassium can tolerate it versus increasing hydralazine    4.  Electrolytes:   Mild metabolic acidosis from renal disease doing well at 26  Hyperkalemia now better at 4.5 low potassium diet    5.  Mineral bone disorder:  Calcium/magnesium/phosphorus:  All acceptable,   PTH intact: 34.4 at goal from 4/1/2024  Vitamin-D: 30.1 from 4/1/2024 at goal    6.  Dyslipidemia:  Goal LDL:  Less than 100  Current lipid profile: LDL 25/HDL 34/triglycerides 331 from 4/8/24  Recommendations:  Per Endocrinology but essentially at goal      7.  Anemia:   Current hemoglobin 11.2 stablel  Check iron studies patient followed closely by GI as well    8.  Other problems:  Depression  Diabetes mellitus per primary medical physician: I would avoid SGLT2 inhibitors at the moment with frequent urinary tract infections  Hypothyroidism  SHAYAN on CPAP  Fibromyalgia/osteoarthritis:  Patient with myoclonic movements, seems related to gabapentin it was adjusted with resolution.  Nephrolithiasis  Basal cell/squamous CA of the skin  Urinary stress incontinence  Status post incisional hernia repairs  hospitalization as outlined below from severe GERD with hypoxemia and shortness of breath from a failed Nissen  fundoplication from prior hiatal hernia repair.  Patient is due to have further testing with an EGD by GI and then subsequently thoracic surgery consultation for possible repair(however, according to the patient at this juncture she was felt I risk so no surgery is planned at this time)  In addition, she was placed on Protonix 40 b.i.d. and Pepcid 40 mg hs  Hospitalization on 07/14/2020 secondary confusion at home.  Apparently she had protracted hospital course in [place] following aspiration pneumonia.  Ten days in the ICU on a ventilator.  No overt infectious process.  Low probability for pulmonary embolus despite an elevated D-dimer.  Had mild leukocytosis/SIRS criteria subsequently discharged 1 day later when she clinically improved.  Symptoms were felt secondary to her protracted ICU course.   hospitalization with discharge on 05/18/21:  Epigastric pain following an EGD on 05/13/2021 for Botox injection at the pylorus for treatment for gastroparesis.  Apparently associated with chocolate ingestion and possible cough with aspiration  Complicated by NIKOS with creatinine to 2.6 which improved with IV fluids TO 1.92 ON 05/18/2021   Hospitalization 04/13/2022:  With hypotension fatigue noted to have a low blood pressure mild increasing creatinine, dysuria treated with Bactrim but had an allergic reaction to it as an outpatient she received intravenous ceftriaxone and she was discharged on doxycycline.  Recently placed on spironolactone for blood pressure all medications were held decide from metoprolol.  Amlodipine was resumed, torsemide re-initiated, but spironolactone was stopped.  Maintain off hydralazine possibly add olmesartan  Creatinine initially as high as 2.27 reduce down to 2.16 upon discharge  Patient admitted 1/7/2023 secondary headache and confusion left temporal symptoms and temporal artery tenderness with an elevated ESR started empirically on steroids per neurology recommendation status post  temporal artery biopsy.  However she developed a vesicular rash felt to have herpes zoster ophthalmologists started on Valtrex seen by ophthalmology eyedrops also initiated.  Patient discharged on Valtrex and high-dose gabapentin with follow-up with her primary care physician.  Patient with leukocytosis most compatible with steroids  Admitted 8/19/2023 after recent UTI history of urinary retention and straight catheterizes herself: Admitted with NIKOS from poor oral intake UTI with Serratia given IV fluids placed on ertapenem/change in mental status noted.  Urinary straight catheterizations recommended to be increased by urology.  Torsemide 5 mg every other day.  Admitted 9/9 with change in mental status status post recent discharge earlier in September with UTI.  Seen by geriatrics recommended formal cognitive testing as an outpatient.  (Warren multifactorial from chronic medical conditions?  Underlying dementia): Patient's course complicated by acute kidney injury with a creatinine of 2.5 on admission diuretics were held gentle IV fluids creatinine back to baseline 2.07  Admitted 9/12/2023: COPD exacerbation treated with steroids nebulizers and oxygen.           GI health maintenance:  Last colonoscopy: 7/30/2021: By Dr. Osmin Mosqueda  recommended follow-up in 3 years which would be July 2024 patient has an appointment May 29    PATIENT INSTRUCTIONS:    Patient Instructions   Visit summary:  - Overall kidney function is stable!  -Your blood pressure is elevated you have perhaps slight fluid retention as we discussed so I am going to add back a very small dose of water pill torsemide please see below    -As always avoid salt        1. Medication changes today:  Please begin torsemide 10 mg daily in the morning no other medication changes    2.  General instructions:  Please avoid salt as outlined above  Try to stay as active as possible    3.  Please go for non fasting  lab work 1-2 weeks after making the above  medication change.    4.  Please take 1 week a blood pressure readings 2 weeks after starting the torsemide but please remember to do it before taking your blood pressure pills not after    AS FOLLOWS  MORNING AND EVENING, SITTING AND STANDING as follows:  TAKE THE MORNING READINGS BEFORE ANY MEDICATIONS AND WHEN YOU ARE RELAXED FOR SEVERAL MINUTES  TAKE THE EVENING READINGS:  BETWEEN 7-10 P.M.; PRIOR TO ANY MEDICATIONS; AT LEAST IN OUR  FROM DINNER; AND CERTAINLY AFTER RELAXING FOR A FEW MINUTES  PLEASE INCLUDE HEART RATE WITH YOUR BLOOD PRESSURE READINGS  When taking standing readings, keep your arm supported at heart level and not dangling  Make sure you are sitting with your back supported and feet on the ground and do not cross your legs or feet  Make sure you have not taken any coffee or caffeine products or exercised or smoke cigarettes at least 30 minutes before taking your blood pressure  Then please mail these readings into the office            5.  Follow-up in 4 months  Please bring in 1 week a blood pressure readings morning evening, sitting and standing is outlined above  PLEASE BRING AN YOUR BLOOD PRESSURE MACHINE TO CORRELATE WITH THE OFFICE MACHINE AT THIS NEXT SCHEDULED VISIT  Please go for fasting lab work 1-2 weeks prior to your appointment      6.  General non medical recommendations:  AVOID SALT BUT NOT ADDING AN READING LABELS TO MAKE SURE THERE IS LOW-SALT IN THE FOOD THAT YOU ARE EATING  Goal is less than 2 g of sodium intake or less than 5 g of sodium chloride intake per day    Avoid nonsteroidal anti-inflammatory drugs such as Naprosyn, ibuprofen, Aleve, Advil, Celebrex, Meloxicam (Mobic) etc.  You can use Tylenol as needed if you do not have any liver condition to be concerned about    Avoid medications such as Sudafed or decongestants and antihistamines that contained the D component which is the decongestant.  You can take antihistamines without the decongestant or D  "component.    Try to avoid medications such as pantoprazole or  Protonix/Nexium or Esomeprazole)/Prilosec or omeprazole/Prevacid or lansoprazole/AcipHex or Rabeprazole.  If you are able to, use Pepcid as this is safer for your kidneys.    Try to exercise at least 30 minutes 3 days a week to begin with with an ultimate goal of 5 days a week for at least 30 minutes    Please do not drink more than 2 glasses of alcohol/wine on a daily basis as this may contribute to your high blood pressure.            Subjective:   There has been no hospitalizations or acute illnesses since last visit.  The patient overall is feeling well.  No fevers, chills, or cough or colds; just allergy symptoms  Good appetite and fair to good energy  No hematuria, dysuria, voiding symptoms or foamy urine  No gastrointestinal symptoms  No chest pain, some mild dyspnea on exertion, unchanged some leg swelling at nighttime, perhaps slightly more noted worse with salty food  No headaches, dizziness or lightheadedness  Blood pressure medications:  Metoprolol tartrate 50 mg twice a day  Hydralazine 25 mg twice a day  Amlodipine 5 mg twice a day just increased over the last couple of weeks    Renal pertinent medications:  Vitamin D 2000 units daily-calcium 500 mg daily  Rosuvastatin 5 mg daily icosapent 2 capsules twice a day/co-Q10 100 mg daily  Protonix 40 mg twice a day  Aspirin 81 mg daily    ROS:  See HPI, otherwise review of systems as completely reviewed with the patient are negative    Past Medical History:   Diagnosis Date   • Actinic keratosis    • Acute cystitis without hematuria 04/28/2023   • Acute cystitis without hematuria 04/28/2023   • Acute respiratory failure with hypoxia (HCC)    • Acute-on-chronic kidney injury  (HCC)    • NIKOS (acute kidney injury) (HCC) 05/08/2020   • Allergic    • Allergic rhinitis    • Ambulatory dysfunction 10/22/2020   • AMS (altered mental status) 07/14/2020   • Anesthesia     pt reports \"had to use double " "lumen endo tube for hiatal hernia repair/so surgeon could get to where he needed to work\"   • Arthritis    • Aspiration into airway    • Michael esophagus 1993    Lot of stress with children   • Basal cell carcinoma 2007    left cheek    • BCC (basal cell carcinoma) 05/27/2021    Left Nasal tip   • Breast discharge 06/19/2023   • Breast pain 06/19/2023   • Cancer (HCC)     squamous cell cancer on forhead   • Cancer (HCC)     basal cell on nose    • Cholelithiasis    • Chronic kidney disease 2000, 2018    Stones, kidney disease stage 4   • Chronic pain disorder     bilat feet and joint pain on occas   • Colon polyp    • Confusion 10/30/2023   • Constipation    • COPD (chronic obstructive pulmonary disease) (HCC)    • COVID-19 08/2021    recovered at home/did receive monoclonal infusion   • COVID-19 virus infection 08/16/2021   • Dental bridge present    • Depression    • Diabetes mellitus (HCC)     Type 2   • Diabetic neuropathy (HCC)    • Difficulty walking 2017   • Disease of thyroid gland    • Dyspnea    • Elevated liver enzymes 04/04/2023   • Epigastric abdominal pain with severe diabetic gastroparesis, status post EGD/Botox injection, 5/14 05/17/2021   • Facial abrasion, initial encounter 03/22/2023   • Fall 03/22/2023   • Family history of thyroid problem    • Fatty liver    • Fibromyalgia, primary    • Fracture of orbital floor, blow-out, right, closed, with routine healing, subsequent encounter 03/22/2023   • GERD (gastroesophageal reflux disease)    • Heart burn    • Hiatal hernia    • History of cholecystectomy 10/27/2023   • History of colon polyps 07/30/2021   • History of kidney stones 09/29/2020    Hx of recurrent kidney stones   • History of kidney stones 09/29/2020    Hx of recurrent kidney stones  Formatting of this note might be different from the original. Hx of recurrent kidney stones  Last Assessment & Plan:  Formatting of this note might be different from the original. We will set her up for " follow-up in 3 months with a KUB prior.  She knows to call if she has any kidney stone type pain in the meantime.   • History of Nissen fundoplication 10/27/2023   • History of pneumonia    • History of repair of hiatal hernia 05/03/2020   • Hyperlipidemia    • Hyperplasia, parathyroid (HCC)    • Hypertension    • Hypertensive urgency 10/27/2023   • Hyponatremia 04/26/2023   • Hypotension 04/13/2022   • Kidney problem    • Kidney stone    • Left hip pain 10/05/2023   • Leukocytosis 07/14/2020   • Memory loss Julu2 2020   • Motion sickness    • Motion sickness    • Multiple closed fractures of ribs of right side 03/22/2023   • Nasal bones, closed fracture 03/22/2023   • Neck pain    • Obesity 1978   • Obesity (BMI 30.0-34.9)    • Other chest pain 09/13/2021   • Other fatigue 09/21/2023   • Peripheral neuropathy    • Pollen allergies    • RLS (restless legs syndrome)    • S/P foot surgery 07/20/2022   • SCC (squamous cell carcinoma) 05/04/2021    left mid forehead   • SCC (squamous cell carcinoma) 2023    chest   • Seasonal allergies    • Shingles 01 05 23   • Sleep apnea     has inspire   • Squamous cell skin cancer 2007    left cheek    • Stroke (HCC)    • Swollen ankles    • 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, left     great toe fusion   • Urinary incontinence    • Urinary tract infection 03/28/2022   • Wears glasses      Past Surgical History:   Procedure Laterality Date   • ABDOMINAL SURGERY  1997,2015,1972    Nissen x2 1972 tubal ligarion   • ARTHROSCOPY KNEE Right    • BREAST BIOPSY      stereotactic-benign   • BREAST BIOPSY      stereo-benign   • BREAST CYST EXCISION Bilateral     benign   • BREAST EXCISIONAL BIOPSY      unknown date-benign   • BREAST EXCISIONAL BIOPSY      unknown date-benign   • BREAST EXCISIONAL BIOPSY      unknown date-benign   • BREAST EXCISIONAL BIOPSY      unknown age-benign   • CARDIAC CATHETERIZATION     • CHOLECYSTECTOMY     • COLONOSCOPY     • EXAMINATION UNDER ANESTHESIA N/A  06/24/2021    Procedure: EXAM UNDER ANESTHESIA (EUA), DISE;  Surgeon: Gregory Maier MD;  Location: [facility];  Service: ENT   • HIATAL HERNIA REPAIR     • KNEE SURGERY Right     Torn maniscus lap surg   • LIPOSUCTION     • LYMPH NODE BIOPSY     • MOHS SURGERY  05/20/2021    left mid forehead-Mickey   • MOHS SURGERY Left 05/27/2021    Left nasal tip- mickey    • NE LIGATION/BIOPSY TEMPORAL ARTERY Left 01/05/2023    Procedure: BIOPSY ARTERY TEMPORAL;  Surgeon: Alec Dennis DO;  [facility];  Service: Vascular   • NE OPEN IMPLANTATION CRANIAL NERVE BOOM & PULSE GEN N/A 11/10/2021    Procedure: INSERTION UPPER AIRWAY STIMULATOR, INSPIRE IMPLANT;  Surgeon: Gregory Maier MD;  Location[facility];  Service: ENT   • NE UNLISTED PROCEDURE FOOT/TOES Right 07/19/2022    Procedure: 1st metatarsal phalangeal joint fusion;  Surgeon: Dede Bonner DPM;  Location[facility];  Service: Podiatry   • REDUCTION MAMMAPLASTY Bilateral 2000   • REDUCTION MAMMAPLASTY     • SKIN BIOPSY     • SKIN CANCER EXCISION  2007    squamous cell carcinoma    • SKIN CANCER EXCISION  2007    basal cell carcinoma   • SKIN CANCER EXCISION  2023    SCCIS chest   • SQUAMOUS CELL CARCINOMA EXCISION     • TOE SURGERY Right 08/04/2022    Right Great Toe Fusion   • TONSILLECTOMY     • TUBAL LIGATION     • UPPER GASTROINTESTINAL ENDOSCOPY     • US GUIDED BREAST BIOPSY RIGHT COMPLETE Right 07/18/2022     Family History   Problem Relation Age of Onset   • Heart disease Mother    • Depression Mother    • Hypertension Mother    • COPD Mother    • Hearing loss Mother    • Anxiety disorder Mother    • Heart disease Father    • Lung cancer Father 70        Smoker    • Cancer Father         brain   • Alcohol abuse Father    • Dementia Father    • Hypertension Father    • Thyroid disease Father    • COPD Father    • Arthritis Father    • Brain cancer Father 74   • Hypertension Sister    • Diabetes Sister    • Heart disease Sister    • Thyroid  disease Sister    • Cancer Sister         Lympoma, lung   • Lung cancer Sister 79   • Anxiety disorder Sister    • Migraines Sister    • Hypertension Brother    • Diabetes Brother    • Cancer Brother         Throat   • Dementia Brother    • Stroke Brother    • Hypertension Brother    • Heart disease Brother    • Diabetes Brother    • Hypertension Brother    • No Known Problems Son    • No Known Problems Son    • Brain cancer Paternal Aunt         unknown age   • Diabetes Sister    • Hypertension Sister    • Diabetes Brother    • Breast cancer Neg Hx       reports that she quit smoking about 48 years ago. Her smoking use included cigarettes. She started smoking about 58 years ago. She has a 20 pack-year smoking history. She has been exposed to tobacco smoke. She has never used smokeless tobacco. She reports current alcohol use. She reports that she does not use drugs.    I COMPLETELY REVIEWED THE PAST MEDICAL HISTORY/PAST SURGICAL HISTORY/SOCIAL HISTORY/FAMILY HISTORY/AND MEDICATIONS  AND UPDATED ALL    Objective:     Vitals:   BP sitting on right: 170/70 with a heart rate of 52 and regular same on left  BP standing on the right: 162/68 with a heart rate of 56 and regular    Weight (last 2 days)       Date/Time Weight    05/10/24 1252 75.3 (166)          Wt Readings from Last 3 Encounters:   05/10/24 75.3 kg (166 lb)   04/23/24 73.5 kg (162 lb)   03/14/24 71.3 kg (157 lb 3.2 oz)       Body mass index is 28.49 kg/m².    Physical Exam: General:  No acute distress  Skin:  No acute rash  Eyes:  No scleral icterus, noninjected, no discharge from eyes  ENT:  Moist mucous membranes  Neck:  Supple, no jugular venous distention, trachea is midline, no lymphadenopathy and no thyromegaly  Back   No CVAT  Chest:  Clear to auscultation and percussion, good respiratory effort  CVS:  Regular rate and rhythm without a rub, or gallops or murmurs  Abdomen:  obese,Soft and nontender with normal bowel sounds  Extremities:  No cyanosis  and no edema, no arthritic changes, normal range of motion  Neuro:  Grossly intact  Psych:  Alert, oriented x3 and appropriate      Medications:    Current Outpatient Medications:   •  acetaminophen (TYLENOL) 500 mg tablet, Take 1,000 mg by mouth every 6 (six) hours as needed for mild pain, Disp: , Rfl:   •  albuterol (Ventolin HFA) 90 mcg/act inhaler, Inhale 2 puffs every 6 (six) hours as needed for wheezing, Disp: 54 g, Rfl: 0  •  amLODIPine (NORVASC) 5 mg tablet, Take 1 tablet (5 mg total) by mouth daily (Patient taking differently: Take 10 mg by mouth daily), Disp: 90 tablet, Rfl: 1  •  aspirin (ECOTRIN LOW STRENGTH) 81 mg EC tablet, Take 1 tablet (81 mg total) by mouth daily Do not start before October 31, 2023., Disp: , Rfl: 0  •  B-D ULTRAFINE III SHORT PEN 31G X 8 MM MISC, , Disp: , Rfl: 3  •  Calcium Citrate 250 MG TABS, TAKE 2 TABLETS (500MG) 2 TIMES A DAY (Patient taking differently: Take 500 mg by mouth in the morning), Disp: 360 tablet, Rfl: 1  •  Coenzyme Q10 (CoQ10) 100 MG CAPS, Take 100 mg by mouth in the morning Bed time, Disp: , Rfl:   •  Cyanocobalamin (Vitamin B12) 1000 MCG TBCR, Take 1,000 mcg by mouth once a week, Disp: , Rfl:   •  escitalopram (LEXAPRO) 10 mg tablet, Take 10 mg by mouth daily, Disp: , Rfl:   •  famotidine (PEPCID) 10 mg tablet, Take 1 tablet (10 mg total) by mouth daily Do not start before September 13, 2023., Disp: 30 tablet, Rfl: 0  •  hydrALAZINE (APRESOLINE) 25 mg tablet, Take 1 tablet (25 mg total) by mouth 2 (two) times a day, Disp: , Rfl:   •  Icosapent Ethyl 1 g CAPS, Take 2 capsules (2 g total) by mouth 2 (two) times a day, Disp: 120 capsule, Rfl: 3  •  insulin aspart (NovoLOG) 100 units/mL injection, Inject under the skin 3 (three) times a day before meals 5units, 5units, 10units., Disp: , Rfl:   •  insulin detemir (Levemir FlexTouch) 100 Units/mL injection pen, Inject 20 Units under the skin every evening, Disp: , Rfl:   •  levothyroxine 100 mcg tablet, TAKE 1  TABLET BY MOUTH EVERY DAY (Patient taking differently: Take 124 mcg by mouth daily), Disp: 90 tablet, Rfl: 1  •  methenamine hippurate (HIPREX) 1 g tablet, Take 1 tablet (1 g total) by mouth 2 (two) times a day with meals, Disp: 180 tablet, Rfl: 3  •  metoprolol tartrate (LOPRESSOR) 50 mg tablet, Take 1 tablet (50 mg total) by mouth every 12 (twelve) hours, Disp: 180 tablet, Rfl: 3  •  NON FORMULARY, , Disp: , Rfl:   •  pantoprazole (PROTONIX) 40 mg tablet, Take 1 tablet (40 mg total) by mouth 2 (two) times a day, Disp: 180 tablet, Rfl: 1  •  pramipexole (MIRAPEX) 0.25 mg tablet, Take 1 tablet (0.25 mg total) by mouth daily at bedtime, Disp: 90 tablet, Rfl: 0  •  Probiotic Product (PROBIOTIC BLEND PO), Take by mouth, Disp: , Rfl:   •  Restasis 0.05 % ophthalmic emulsion, Administer 1 drop to both eyes every 12 (twelve) hours, Disp: , Rfl:   •  rosuvastatin (CRESTOR) 5 mg tablet, Take 1 tablet (5 mg total) by mouth daily, Disp: 90 tablet, Rfl: 3  •  torsemide (DEMADEX) 10 mg tablet, Take 1 tablet (10 mg total) by mouth daily, Disp: 30 tablet, Rfl: 5  •  Vitamin D, Cholecalciferol, 25 MCG (1000 UT) TABS, Take 2,000 Units by mouth in the morning, Disp: , Rfl:   •  Icosapent Ethyl (Vascepa) 0.5 g CAPS, Take 2 g by mouth 2 (two) times a day, Disp: 120 capsule, Rfl: 3  •  Icosapent Ethyl (Vascepa) 0.5 g CAPS, Take 0.5 mg by mouth 2 (two) times a day, Disp: 180 capsule, Rfl: 3    Lab, Imaging and other studies: I have personally reviewed pertinent labs.  Laboratory Results:  Results for orders placed or performed in visit on 05/06/24   TSH, 3rd generation   Result Value Ref Range    TSH 3RD GENERATON 3.821 0.450 - 4.500 uIU/mL     *Note: Due to a large number of results and/or encounters for the requested time period, some results have not been displayed. A complete set of results can be found in Results Review.       Results from last 7 days   Lab Units 05/06/24  0714   POTASSIUM mmol/L 4.5   CHLORIDE mmol/L 107   CO2  "mmol/L 26   BUN mg/dL 43*   CREATININE mg/dL 2.43*   CALCIUM mg/dL 8.6         Radiology review:   chest X-ray    Ultrasound      Portions of the record may have been created with voice recognition software.  Occasional wrong word or \"sound a like\" substitutions may have occurred due to the inherent limitations of voice recognition software.  Read the chart carefully and recognize, using context, where substitutions have occurred.                    "

## 2024-05-14 DIAGNOSIS — I10 PRIMARY HYPERTENSION: ICD-10-CM

## 2024-05-15 ENCOUNTER — TELEPHONE (OUTPATIENT)
Age: 74
End: 2024-05-15

## 2024-05-15 ENCOUNTER — TELEPHONE (OUTPATIENT)
Dept: INTERNAL MEDICINE CLINIC | Facility: CLINIC | Age: 74
End: 2024-05-15

## 2024-05-15 RX ORDER — AMLODIPINE BESYLATE 5 MG/1
5 TABLET ORAL DAILY
Qty: 90 TABLET | Refills: 1 | Status: SHIPPED | OUTPATIENT
Start: 2024-05-15 | End: 2024-05-21 | Stop reason: SDUPTHER

## 2024-05-16 ENCOUNTER — APPOINTMENT (OUTPATIENT)
Dept: LAB | Facility: AMBULARY SURGERY CENTER | Age: 74
End: 2024-05-16
Payer: MEDICARE

## 2024-05-16 ENCOUNTER — DOCUMENTATION (OUTPATIENT)
Dept: NEPHROLOGY | Facility: CLINIC | Age: 74
End: 2024-05-16

## 2024-05-16 ENCOUNTER — TELEPHONE (OUTPATIENT)
Dept: NEPHROLOGY | Facility: CLINIC | Age: 74
End: 2024-05-16

## 2024-05-16 DIAGNOSIS — I12.0 PARENCHYMAL RENAL HYPERTENSION, STAGE 5 CHRONIC KIDNEY DISEASE OR END STAGE RENAL DISEASE (HCC): ICD-10-CM

## 2024-05-16 DIAGNOSIS — N18.5 STAGE 5 CHRONIC KIDNEY DISEASE (HCC): Primary | ICD-10-CM

## 2024-05-16 DIAGNOSIS — E11.21 DIABETIC NEPHROPATHY ASSOCIATED WITH TYPE 2 DIABETES MELLITUS (HCC): ICD-10-CM

## 2024-05-16 DIAGNOSIS — N18.5 ANEMIA IN STAGE 5 CHRONIC KIDNEY DISEASE, NOT ON CHRONIC DIALYSIS  (HCC): ICD-10-CM

## 2024-05-16 DIAGNOSIS — N18.5 CKD (CHRONIC KIDNEY DISEASE) STAGE 5, GFR LESS THAN 15 ML/MIN (HCC): ICD-10-CM

## 2024-05-16 DIAGNOSIS — E55.9 VITAMIN D DEFICIENCY: Chronic | ICD-10-CM

## 2024-05-16 DIAGNOSIS — D63.1 ANEMIA IN STAGE 5 CHRONIC KIDNEY DISEASE, NOT ON CHRONIC DIALYSIS  (HCC): ICD-10-CM

## 2024-05-16 DIAGNOSIS — E78.2 MIXED HYPERLIPIDEMIA: ICD-10-CM

## 2024-05-16 DIAGNOSIS — N25.81 SECONDARY HYPERPARATHYROIDISM OF RENAL ORIGIN (HCC): Chronic | ICD-10-CM

## 2024-05-16 LAB
ANION GAP SERPL CALCULATED.3IONS-SCNC: 10 MMOL/L (ref 4–13)
BUN SERPL-MCNC: 46 MG/DL (ref 5–25)
CALCIUM SERPL-MCNC: 8.8 MG/DL (ref 8.4–10.2)
CHLORIDE SERPL-SCNC: 104 MMOL/L (ref 96–108)
CO2 SERPL-SCNC: 27 MMOL/L (ref 21–32)
CREAT SERPL-MCNC: 2.65 MG/DL (ref 0.6–1.3)
GFR SERPL CREATININE-BSD FRML MDRD: 17 ML/MIN/1.73SQ M
GLUCOSE P FAST SERPL-MCNC: 99 MG/DL (ref 65–99)
POTASSIUM SERPL-SCNC: 3.8 MMOL/L (ref 3.5–5.3)
SODIUM SERPL-SCNC: 141 MMOL/L (ref 135–147)

## 2024-05-16 PROCEDURE — 36415 COLL VENOUS BLD VENIPUNCTURE: CPT

## 2024-05-16 PROCEDURE — 80048 BASIC METABOLIC PNL TOTAL CA: CPT

## 2024-05-16 NOTE — TELEPHONE ENCOUNTER
"Pt advised to increase Torsemide to  20 mg. QD for several days to see if this helps her swelling.  Per , to call back on Monday with an update.  BMP in one week.      Advised pt that labs of 5/16/24 look good per Dr. Baires and no changes.    She wanted to advise Dr. Baires that she is having bilateral leg edema, from the ankle to the knee.  \"Not horrible but it is present every day.  Better in the AM but by afternoon swelling is there.   She is taking Torsemide 10 mg. QD which was prescribed about a week ago for her BP but she states it is not helping with the swelling.    ----- Message from Donaldo Baires MD sent at 5/16/2024 11:26 AM EDT -----  Labs are very good!  No changes  ----- Message -----  From: Lab, Background User  Sent: 5/16/2024  11:01 AM EDT  To: Donaldo Baires MD  "

## 2024-05-16 NOTE — TELEPHONE ENCOUNTER
PA for ICOSAPENT ETHYL 1 G SUBMITTED     Submitted via    [x][employer]M-[employer] [id number]  []S[employer]crin[employer]AP-Case ID #   []Faxed to plan   []Other website   []Phone call Case ID #     Office notes sent, clinical questions answered. Awaiting determination    Turnaround time for your insurance to make a decision on your Prior Authorization can take 7-21 business days.

## 2024-05-17 NOTE — TELEPHONE ENCOUNTER
PA for ICOSAPENT Approved     Date(s) approved 5/2/24 UNTIL FUTHER NOTICE    Case #[id number]    Patient advised by          [] MyChart Message  [x] Phone call   []LMOM  []L/M to call office as no active Communication consent on file  []Unable to leave detailed message as VM not approved on Communication consent       Pharmacy advised by    [x]Fax  []Phone call    Approval letter scanned into Media Yes

## 2024-05-20 ENCOUNTER — DOCUMENTATION (OUTPATIENT)
Dept: NEPHROLOGY | Facility: CLINIC | Age: 74
End: 2024-05-20

## 2024-05-20 NOTE — TELEPHONE ENCOUNTER
Message left on patient's VM to take Torsemide 10 mg. QD except 20 mg. On Monday, Wednesday, Fridays.  If swelling recurs, then 20 mg. OD per Dr. Baires.  BMP in 1-2 weeks after med adjustments.  Send in one weeks worth of BP four weeks after med changes per Dr. Baires.

## 2024-05-20 NOTE — TELEPHONE ENCOUNTER
Patient calling to give Dr. Baires an update since increasing torsemide on Friday. She states there is no more swelling in her legs and she feels good. BP yesterday was 159/89 and 154/81. Should she continue torsemide 20mg?  Please advise

## 2024-05-21 DIAGNOSIS — N18.5 ANEMIA IN STAGE 5 CHRONIC KIDNEY DISEASE, NOT ON CHRONIC DIALYSIS  (HCC): ICD-10-CM

## 2024-05-21 DIAGNOSIS — N18.5 CKD (CHRONIC KIDNEY DISEASE) STAGE 5, GFR LESS THAN 15 ML/MIN (HCC): ICD-10-CM

## 2024-05-21 DIAGNOSIS — N25.81 SECONDARY HYPERPARATHYROIDISM OF RENAL ORIGIN (HCC): Chronic | ICD-10-CM

## 2024-05-21 DIAGNOSIS — E78.2 MIXED HYPERLIPIDEMIA: ICD-10-CM

## 2024-05-21 DIAGNOSIS — D63.1 ANEMIA IN STAGE 5 CHRONIC KIDNEY DISEASE, NOT ON CHRONIC DIALYSIS  (HCC): ICD-10-CM

## 2024-05-21 DIAGNOSIS — I12.0 PARENCHYMAL RENAL HYPERTENSION, STAGE 5 CHRONIC KIDNEY DISEASE OR END STAGE RENAL DISEASE (HCC): ICD-10-CM

## 2024-05-21 DIAGNOSIS — E11.21 DIABETIC NEPHROPATHY ASSOCIATED WITH TYPE 2 DIABETES MELLITUS (HCC): ICD-10-CM

## 2024-05-21 DIAGNOSIS — E55.9 VITAMIN D DEFICIENCY: Chronic | ICD-10-CM

## 2024-05-21 DIAGNOSIS — I10 PRIMARY HYPERTENSION: ICD-10-CM

## 2024-05-21 RX ORDER — TORSEMIDE 10 MG/1
TABLET ORAL
Qty: 50 TABLET | Refills: 4 | Status: SHIPPED | OUTPATIENT
Start: 2024-05-21

## 2024-05-22 RX ORDER — AMLODIPINE BESYLATE 5 MG/1
10 TABLET ORAL DAILY
Qty: 180 TABLET | Refills: 1 | Status: SHIPPED | OUTPATIENT
Start: 2024-05-22

## 2024-05-29 ENCOUNTER — OFFICE VISIT (OUTPATIENT)
Dept: GASTROENTEROLOGY | Facility: AMBULARY SURGERY CENTER | Age: 74
End: 2024-05-29
Payer: MEDICARE

## 2024-05-29 VITALS
WEIGHT: 155.2 LBS | OXYGEN SATURATION: 98 % | DIASTOLIC BLOOD PRESSURE: 68 MMHG | HEART RATE: 57 BPM | BODY MASS INDEX: 26.5 KG/M2 | SYSTOLIC BLOOD PRESSURE: 134 MMHG | HEIGHT: 64 IN

## 2024-05-29 DIAGNOSIS — K21.9 GASTROESOPHAGEAL REFLUX DISEASE, UNSPECIFIED WHETHER ESOPHAGITIS PRESENT: ICD-10-CM

## 2024-05-29 DIAGNOSIS — E11.43 GASTROPARESIS DIABETICORUM  (HCC): ICD-10-CM

## 2024-05-29 DIAGNOSIS — R19.4 CHANGE IN BOWEL HABITS: ICD-10-CM

## 2024-05-29 DIAGNOSIS — R74.8 ELEVATED LIVER ENZYMES: Primary | ICD-10-CM

## 2024-05-29 DIAGNOSIS — K31.84 GASTROPARESIS DIABETICORUM  (HCC): ICD-10-CM

## 2024-05-29 DIAGNOSIS — K21.9 GASTROESOPHAGEAL REFLUX DISEASE WITHOUT ESOPHAGITIS: Chronic | ICD-10-CM

## 2024-05-29 DIAGNOSIS — K59.01 SLOW TRANSIT CONSTIPATION: Chronic | ICD-10-CM

## 2024-05-29 PROCEDURE — 99214 OFFICE O/P EST MOD 30 MIN: CPT | Performed by: INTERNAL MEDICINE

## 2024-05-29 RX ORDER — PANTOPRAZOLE SODIUM 40 MG/1
40 TABLET, DELAYED RELEASE ORAL DAILY
Qty: 180 TABLET | Refills: 1 | Status: SHIPPED | OUTPATIENT
Start: 2024-05-29

## 2024-05-29 NOTE — PROGRESS NOTES
[facility]  Progress Note - Trina Davis 74 y.o. female MRN: [id number]    Unit/Bed#:  Encounter: [id number]    Assessment/Plan:      1.  History of gastroparesis status post Botox injection in 2022    2.  GERD: Most recent EGD notable only for subcentimeter gastric hyperplastic polyp.  Patient was started on pantoprazole 40 mg twice daily and famotidine on prior visit however given history of CKD stage IV, ordered recommend tapering the dose down to once daily PPI and continuing the famotidine.  Patient is in agreement with this.  Patient reports that  she has noted significant improvement with PPI however.  Would recommend pantoprazole 40 mg before dinner and famotidine in the morning as acid reflux symptoms are predominant at nighttime.  Continue to follow antireflux measures per  Continue with gastroparesis diet.  Continue to avoid NSAIDs.    3.  Colon cancer screening: noted to have a 7 colon polyps, diverticulosis and hemorrhoids on a colonoscopy in 2021.    4.  Mild elevation of ALT:  -Recommend repeat hepatic function panel.  -Avoid hepatotoxic medications.    5.  Change in bowel habits with constipation: No change in medications, thyroid function was elevated earlier this year and medication was adjusted, suspect change in bowel habits could have been due to thyroid dysfunction however given patient's history of multiple colon polyps, it would be reasonable to proceed with colonoscopy to assess for any colon polyps or lesions.  Would recommend GoLytely bowel prep given history of CKD stage IV.  Continue with MiraLAX every other day as it seems to be effective.  X-ray previously showed increase stool burden.    6.  Chronic left flank/upper quadrant pain: Symptoms going on for over a year.  Does not appear to be related to diet or bowel habits, no abdominal hernias on exam.  CAT scan from last year did not show any acute abdominal pelvic injury.  There was left renal calculi that was  "nonobstructing however.  Suspect pain could be musculoskeletal, can trial warm compresses.      Subjective:     74-year-old female with history of gastroparesis, hiatal hernia, COPD, type 2 diabetes, cholecystectomy, presents for follow-up.  Patient reports of the constipation is somewhat improved with MiraLAX, reports that she takes this every other day.  Patient reports that historically she has had constipation but now experiences days of constipation followed by loose stools.  She is not sure what accounted for the change in bowel habits.  She did have her thyroid medications adjusted.  She denies any blood in the stool.  No significant weight loss.  She does report left flank/upper quadrant pain which is chronic, reports that this has been going on for over a year, she had a CAT scan last year which was notable only for nonobstructing left renal calculi.  No other abnormalities were seen.  She has had multiple surgeries including hiatal hernia repair, cholecystectomy.  She denies any blood in the stool, no melena.  Reports that her acid reflux symptoms are well-controlled with twice daily PPI and famotidine.  Patient underwent EGD in February by Dr. Jernigan which was notable for subcentimeter hyperplastic gastric polyp.  Most recent blood work is notable for mild elevation of ALT at 67, CBC notable for hemoglobin of 11.2, normal platelets.  Objective:     Vitals: Blood pressure 134/68, pulse 57, height 5' 4\" (1.626 m), weight 70.4 kg (155 lb 3.2 oz), SpO2 98%.,Body mass index is 26.64 kg/m².    [unfilled]    Physical Exam:    GEN: wn/wd, NAD  HEENT: MMM, no cervical or supraclavicular LAD, anciteric  CV: RRR, no m/r/g  CHEST: CTA b/l, no w/r/r  ABD: +BS, soft, NT/ND, no hepatosplenomegaly  EXT: no c/c/e  SKIN: no rashes  NEURO: aaox3      Invasive Devices       None                           Lab, Imaging and other studies:     No visits with results within 1 Day(s) from this visit.   Latest known visit with results " is:   Appointment on 05/16/2024   Component Date Value    Sodium 05/16/2024 141     Potassium 05/16/2024 3.8     Chloride 05/16/2024 104     CO2 05/16/2024 27     ANION GAP 05/16/2024 10     BUN 05/16/2024 46 (H)     Creatinine 05/16/2024 2.65 (H)     Glucose, Fasting 05/16/2024 99     Calcium 05/16/2024 8.8     eGFR 05/16/2024 17          I have personally reviewed pertinent films in PACS    No current facility-administered medications for this visit.             Answers submitted by the patient for this visit:  Abdominal Pain Questionnaire (Submitted on 5/22/2024)  Chief Complaint: Abdominal pain  Chronicity: recurrent  Onset: more than 1 year ago  Onset quality: gradual  Frequency: intermittently  Episode duration: 3 Days  Progression since onset: gradually improving  Pain location: LLQ  Pain - numeric: 2/10  Pain quality: tearing  Radiates to: does not radiate  anorexia: No  arthralgias: Yes  belching: No  constipation: No  diarrhea: No  dysuria: No  fever: No  flatus: No  frequency: Yes  headaches: No  hematochezia: No  hematuria: No  melena: No  myalgias: Yes  nausea: No  weight loss: Yes  vomiting: No  Aggravated by: eating  Relieved by: recumbency

## 2024-05-29 NOTE — PATIENT INSTRUCTIONS
Scheduled date of colonoscopy (as of today): Aug 30, 2024  Physician performing colonoscopy: Dr. Mosqueda  Location of colonoscopy: [place]  Bowel prep reviewed with patient: Golytely  Instructions reviewed with patient by: JL  Clearances: Diabetic- Levemir, NovoLog

## 2024-05-30 ENCOUNTER — APPOINTMENT (OUTPATIENT)
Dept: LAB | Facility: AMBULARY SURGERY CENTER | Age: 74
End: 2024-05-30
Payer: MEDICARE

## 2024-05-30 DIAGNOSIS — N18.5 CKD (CHRONIC KIDNEY DISEASE) STAGE 5, GFR LESS THAN 15 ML/MIN (HCC): ICD-10-CM

## 2024-05-30 DIAGNOSIS — E55.9 VITAMIN D DEFICIENCY: Chronic | ICD-10-CM

## 2024-05-30 DIAGNOSIS — N18.5 ANEMIA IN STAGE 5 CHRONIC KIDNEY DISEASE, NOT ON CHRONIC DIALYSIS  (HCC): ICD-10-CM

## 2024-05-30 DIAGNOSIS — D63.1 ANEMIA IN STAGE 5 CHRONIC KIDNEY DISEASE, NOT ON CHRONIC DIALYSIS  (HCC): ICD-10-CM

## 2024-05-30 DIAGNOSIS — E78.2 MIXED HYPERLIPIDEMIA: ICD-10-CM

## 2024-05-30 DIAGNOSIS — N18.5 STAGE 5 CHRONIC KIDNEY DISEASE (HCC): ICD-10-CM

## 2024-05-30 DIAGNOSIS — N25.81 SECONDARY HYPERPARATHYROIDISM OF RENAL ORIGIN (HCC): Chronic | ICD-10-CM

## 2024-05-30 DIAGNOSIS — E11.21 DIABETIC NEPHROPATHY ASSOCIATED WITH TYPE 2 DIABETES MELLITUS (HCC): ICD-10-CM

## 2024-05-30 DIAGNOSIS — R74.8 ELEVATED LIVER ENZYMES: ICD-10-CM

## 2024-05-30 DIAGNOSIS — I12.0 PARENCHYMAL RENAL HYPERTENSION, STAGE 5 CHRONIC KIDNEY DISEASE OR END STAGE RENAL DISEASE (HCC): ICD-10-CM

## 2024-05-30 LAB
ALBUMIN SERPL BCP-MCNC: 3.7 G/DL (ref 3.5–5)
ALP SERPL-CCNC: 67 U/L (ref 34–104)
ALT SERPL W P-5'-P-CCNC: 176 U/L (ref 7–52)
ANION GAP SERPL CALCULATED.3IONS-SCNC: 9 MMOL/L (ref 4–13)
AST SERPL W P-5'-P-CCNC: 71 U/L (ref 13–39)
BILIRUB DIRECT SERPL-MCNC: 0.06 MG/DL (ref 0–0.2)
BILIRUB SERPL-MCNC: 0.29 MG/DL (ref 0.2–1)
BUN SERPL-MCNC: 73 MG/DL (ref 5–25)
CALCIUM SERPL-MCNC: 8.4 MG/DL (ref 8.4–10.2)
CHLORIDE SERPL-SCNC: 101 MMOL/L (ref 96–108)
CO2 SERPL-SCNC: 27 MMOL/L (ref 21–32)
CREAT SERPL-MCNC: 2.85 MG/DL (ref 0.6–1.3)
ERYTHROCYTE [DISTWIDTH] IN BLOOD BY AUTOMATED COUNT: 13.2 % (ref 11.6–15.1)
GFR SERPL CREATININE-BSD FRML MDRD: 15 ML/MIN/1.73SQ M
GLUCOSE P FAST SERPL-MCNC: 155 MG/DL (ref 65–99)
HCT VFR BLD AUTO: 34.8 % (ref 34.8–46.1)
HGB BLD-MCNC: 11.4 G/DL (ref 11.5–15.4)
MCH RBC QN AUTO: 30.3 PG (ref 26.8–34.3)
MCHC RBC AUTO-ENTMCNC: 32.8 G/DL (ref 31.4–37.4)
MCV RBC AUTO: 93 FL (ref 82–98)
PLATELET # BLD AUTO: 293 THOUSANDS/UL (ref 149–390)
PMV BLD AUTO: 12.1 FL (ref 8.9–12.7)
POTASSIUM SERPL-SCNC: 3.9 MMOL/L (ref 3.5–5.3)
PROT SERPL-MCNC: 5.9 G/DL (ref 6.4–8.4)
RBC # BLD AUTO: 3.76 MILLION/UL (ref 3.81–5.12)
SODIUM SERPL-SCNC: 137 MMOL/L (ref 135–147)
WBC # BLD AUTO: 11.07 THOUSAND/UL (ref 4.31–10.16)

## 2024-05-30 PROCEDURE — 85027 COMPLETE CBC AUTOMATED: CPT

## 2024-05-30 PROCEDURE — 80076 HEPATIC FUNCTION PANEL: CPT

## 2024-05-30 PROCEDURE — 80048 BASIC METABOLIC PNL TOTAL CA: CPT

## 2024-05-30 PROCEDURE — 36415 COLL VENOUS BLD VENIPUNCTURE: CPT

## 2024-05-31 ENCOUNTER — TELEPHONE (OUTPATIENT)
Dept: NEPHROLOGY | Facility: CLINIC | Age: 74
End: 2024-05-31

## 2024-05-31 DIAGNOSIS — N18.5 CKD (CHRONIC KIDNEY DISEASE) STAGE 5, GFR LESS THAN 15 ML/MIN (HCC): Primary | ICD-10-CM

## 2024-05-31 NOTE — TELEPHONE ENCOUNTER
----- Message from Donaldo Baires MD sent at 5/30/2024  2:10 PM EDT -----  Actually labs look relatively good considering we started torsemide potassium is excellent  Repeat a basic metabolic profile in about 6 weeks to demonstrate stability nonfasting  ----- Message -----  From: Lab, Background User  Sent: 5/30/2024  11:12 AM EDT  To: Donaldo Baires MD

## 2024-06-03 ENCOUNTER — TELEPHONE (OUTPATIENT)
Dept: INTERNAL MEDICINE CLINIC | Facility: CLINIC | Age: 74
End: 2024-06-03

## 2024-06-03 ENCOUNTER — OFFICE VISIT (OUTPATIENT)
Dept: INTERNAL MEDICINE CLINIC | Facility: CLINIC | Age: 74
End: 2024-06-03
Payer: MEDICARE

## 2024-06-03 VITALS
HEART RATE: 53 BPM | HEIGHT: 64 IN | WEIGHT: 156 LBS | DIASTOLIC BLOOD PRESSURE: 72 MMHG | RESPIRATION RATE: 14 BRPM | OXYGEN SATURATION: 97 % | SYSTOLIC BLOOD PRESSURE: 138 MMHG | BODY MASS INDEX: 26.63 KG/M2 | TEMPERATURE: 97.7 F

## 2024-06-03 DIAGNOSIS — D72.829 LEUKOCYTOSIS, UNSPECIFIED TYPE: ICD-10-CM

## 2024-06-03 DIAGNOSIS — D63.1 ANEMIA IN STAGE 5 CHRONIC KIDNEY DISEASE, NOT ON CHRONIC DIALYSIS  (HCC): ICD-10-CM

## 2024-06-03 DIAGNOSIS — Z00.00 MEDICARE ANNUAL WELLNESS VISIT, SUBSEQUENT: ICD-10-CM

## 2024-06-03 DIAGNOSIS — Z00.00 WELCOME TO MEDICARE PREVENTIVE VISIT: Primary | ICD-10-CM

## 2024-06-03 DIAGNOSIS — N18.5 ANEMIA IN STAGE 5 CHRONIC KIDNEY DISEASE, NOT ON CHRONIC DIALYSIS  (HCC): ICD-10-CM

## 2024-06-03 DIAGNOSIS — R74.01 TRANSAMINITIS: ICD-10-CM

## 2024-06-03 DIAGNOSIS — Z01.818 PREOPERATIVE CLEARANCE: ICD-10-CM

## 2024-06-03 DIAGNOSIS — T78.40XS ALLERGY, SEQUELA: ICD-10-CM

## 2024-06-03 PROBLEM — T78.40XA ALLERGIES: Status: ACTIVE | Noted: 2024-06-03

## 2024-06-03 PROCEDURE — 99214 OFFICE O/P EST MOD 30 MIN: CPT | Performed by: STUDENT IN AN ORGANIZED HEALTH CARE EDUCATION/TRAINING PROGRAM

## 2024-06-03 PROCEDURE — G0439 PPPS, SUBSEQ VISIT: HCPCS | Performed by: STUDENT IN AN ORGANIZED HEALTH CARE EDUCATION/TRAINING PROGRAM

## 2024-06-03 RX ORDER — KETOTIFEN FUMARATE 0.35 MG/ML
1 SOLUTION/ DROPS OPHTHALMIC 2 TIMES DAILY
Qty: 14 ML | Refills: 0 | Status: SHIPPED | OUTPATIENT
Start: 2024-06-03

## 2024-06-03 NOTE — PATIENT INSTRUCTIONS
Medicare Preventive Visit Patient Instructions  Thank you for completing your Welcome to Medicare Visit or Medicare Annual Wellness Visit today. Your next wellness visit will be due in one year (6/4/2025).  The screening/preventive services that you may require over the next 5-10 years are detailed below. Some tests may not apply to you based off risk factors and/or age. Screening tests ordered at today's visit but not completed yet may show as past due. Also, please note that scanned in results may not display below.  Preventive Screenings:  Service Recommendations Previous Testing/Comments   Colorectal Cancer Screening  * Colonoscopy    * Fecal Occult Blood Test (FOBT)/Fecal Immunochemical Test (FIT)  * Fecal DNA/Cologuard Test  * Flexible Sigmoidoscopy Age: 45-75 years old   Colonoscopy: every 10 years (may be performed more frequently if at higher risk)  OR  FOBT/FIT: every 1 year  OR  Cologuard: every 3 years  OR  Sigmoidoscopy: every 5 years  Screening may be recommended earlier than age 45 if at higher risk for colorectal cancer. Also, an individualized decision between you and your healthcare provider will decide whether screening between the ages of 76-85 would be appropriate. Colonoscopy: 10/11/2021  FOBT/FIT: Not on file  Cologuard: Not on file  Sigmoidoscopy: Not on file    Screening Current     Breast Cancer Screening Age: 40+ years old  Frequency: every 1-2 years  Not required if history of left and right mastectomy Mammogram: 07/19/2023    Screening Current   Cervical Cancer Screening Between the ages of 21-29, pap smear recommended once every 3 years.   Between the ages of 30-65, can perform pap smear with HPV co-testing every 5 years.   Recommendations may differ for women with a history of total hysterectomy, cervical cancer, or abnormal pap smears in past. Pap Smear: Not on file    Screening Not Indicated   Hepatitis C Screening Once for adults born between 1945 and 1965  More frequently in  patients at high risk for Hepatitis C Hep C Antibody: 11/14/2023    Screening Current   Diabetes Screening 1-2 times per year if you're at risk for diabetes or have pre-diabetes Fasting glucose: 155 mg/dL (5/30/2024)  A1C: 6.9 % (3/5/2024)  Screening Not Indicated  History Diabetes   Cholesterol Screening Once every 5 years if you don't have a lipid disorder. May order more often based on risk factors. Lipid panel: 03/05/2024    Screening Not Indicated  History Lipid Disorder     Other Preventive Screenings Covered by Medicare:  Abdominal Aortic Aneurysm (AAA) Screening: covered once if your at risk. You're considered to be at risk if you have a family history of AAA.  Lung Cancer Screening: covers low dose CT scan once per year if you meet all of the following conditions: (1) Age 55-77; (2) No signs or symptoms of lung cancer; (3) Current smoker or have quit smoking within the last 15 years; (4) You have a tobacco smoking history of at least 20 pack years (packs per day multiplied by number of years you smoked); (5) You get a written order from a healthcare provider.  Glaucoma Screening: covered annually if you're considered high risk: (1) You have diabetes OR (2) Family history of glaucoma OR (3)  aged 50 and older OR (4)  American aged 65 and older  Osteoporosis Screening: covered every 2 years if you meet one of the following conditions: (1) You're estrogen deficient and at risk for osteoporosis based off medical history and other findings; (2) Have a vertebral abnormality; (3) On glucocorticoid therapy for more than 3 months; (4) Have primary hyperparathyroidism; (5) On osteoporosis medications and need to assess response to drug therapy.   Last bone density test (DXA Scan): 06/04/2021.  HIV Screening: covered annually if you're between the age of 15-65. Also covered annually if you are younger than 15 and older than 65 with risk factors for HIV infection. For pregnant patients, it is  covered up to 3 times per pregnancy.    Immunizations:  Immunization Recommendations   Influenza Vaccine Annual influenza vaccination during flu season is recommended for all persons aged >= 6 months who do not have contraindications   Pneumococcal Vaccine   * Pneumococcal conjugate vaccine = PCV13 (Prevnar 13), PCV15 (Vaxneuvance), PCV20 (Prevnar 20)  * Pneumococcal polysaccharide vaccine = PPSV23 (Pneumovax) Adults 19-65 yo with certain risk factors or if 65+ yo  If never received any pneumonia vaccine: recommend Prevnar 20 (PCV20)  Give PCV20 if previously received 1 dose of PCV13 or PPSV23   Hepatitis B Vaccine 3 dose series if at intermediate or high risk (ex: diabetes, end stage renal disease, liver disease)   Respiratory syncytial virus (RSV) Vaccine - COVERED BY MEDICARE PART D  * RSVPreF3 (Arexvy) CDC recommends that adults 60 years of age and older may receive a single dose of RSV vaccine using shared clinical decision-making (SCDM)   Tetanus (Td) Vaccine - COST NOT COVERED BY MEDICARE PART B Following completion of primary series, a booster dose should be given every 10 years to maintain immunity against tetanus. Td may also be given as tetanus wound prophylaxis.   Tdap Vaccine - COST NOT COVERED BY MEDICARE PART B Recommended at least once for all adults. For pregnant patients, recommended with each pregnancy.   Shingles Vaccine (Shingrix) - COST NOT COVERED BY MEDICARE PART B  2 shot series recommended in those 19 years and older who have or will have weakened immune systems or those 50 years and older     Health Maintenance Due:      Topic Date Due   • Breast Cancer Screening: Mammogram  07/19/2024   • Colorectal Cancer Screening  10/10/2024   • Hepatitis C Screening  Completed     Immunizations Due:  There are no preventive care reminders to display for this patient.  Advance Directives   What are advance directives?  Advance directives are legal documents that state your wishes and plans for medical  care. These plans are made ahead of time in case you lose your ability to make decisions for yourself. Advance directives can apply to any medical decision, such as the treatments you want, and if you want to donate organs.   What are the types of advance directives?  There are many types of advance directives, and each state has rules about how to use them. You may choose a combination of any of the following:  Living will:  This is a written record of the treatment you want. You can also choose which treatments you do not want, which to limit, and which to stop at a certain time. This includes surgery, medicine, IV fluid, and tube feedings.   Durable power of  for healthcare (DPAHC):  This is a written record that states who you want to make healthcare choices for you when you are unable to make them for yourself. This person, called a proxy, is usually a family member or a friend. You may choose more than 1 proxy.  Do not resuscitate (DNR) order:  A DNR order is used in case your heart stops beating or you stop breathing. It is a request not to have certain forms of treatment, such as CPR. A DNR order may be included in other types of advance directives.  Medical directive:  This covers the care that you want if you are in a coma, near death, or unable to make decisions for yourself. You can list the treatments you want for each condition. Treatment may include pain medicine, surgery, blood transfusions, dialysis, IV or tube feedings, and a ventilator (breathing machine).  Values history:  This document has questions about your views, beliefs, and how you feel and think about life. This information can help others choose the care that you would choose.  Why are advance directives important?  An advance directive helps you control your care. Although spoken wishes may be used, it is better to have your wishes written down. Spoken wishes can be misunderstood, or not followed. Treatments may be given even if  you do not want them. An advance directive may make it easier for your family to make difficult choices about your care.   Urinary Incontinence   Urinary incontinence (UI)  is when you lose control of your bladder. UI develops because your bladder cannot store or empty urine properly. The 3 most common types of UI are stress incontinence, urge incontinence, or both.  Medicines:   May be given to help strengthen your bladder control. Report any side effects of medication to your healthcare provider.  Do pelvic muscle exercises often:  Your pelvic muscles help you stop urinating. Squeeze these muscles tight for 5 seconds, then relax for 5 seconds. Gradually work up to squeezing for 10 seconds. Do 3 sets of 15 repetitions a day, or as directed. This will help strengthen your pelvic muscles and improve bladder control.  Train your bladder:  Go to the bathroom at set times, such as every 2 hours, even if you do not feel the urge to go. You can also try to hold your urine when you feel the urge to go. For example, hold your urine for 5 minutes when you feel the urge to go. As that becomes easier, hold your urine for 10 minutes.   Self-care:   Keep a UI record.  Write down how often you leak urine and how much you leak. Make a note of what you were doing when you leaked urine.  Drink liquids as directed. You may need to limit the amount of liquid you drink to help control your urine leakage. Do not drink any liquid right before you go to bed. Limit or do not have drinks that contain caffeine or alcohol.   Prevent constipation.  Eat a variety of high-fiber foods. Good examples are high-fiber cereals, beans, vegetables, and whole-grain breads. Walking is the best way to trigger your intestines to have a bowel movement.  Exercise regularly and maintain a healthy weight.  Weight loss and exercise will decrease pressure on your bladder and help you control your leakage.   Use a catheter as directed  to help empty your bladder.  A catheter is a tiny, plastic tube that is put into your bladder to drain your urine.   Go to behavior therapy as directed.  Behavior therapy may be used to help you learn to control your urge to urinate.    Weight Management   Why it is important to manage your weight:  Being overweight increases your risk of health conditions such as heart disease, high blood pressure, type 2 diabetes, and certain types of cancer. It can also increase your risk for osteoarthritis, sleep apnea, and other respiratory problems. Aim for a slow, steady weight loss. Even a small amount of weight loss can lower your risk of health problems.  How to lose weight safely:  A safe and healthy way to lose weight is to eat fewer calories and get regular exercise. You can lose up about 1 pound a week by decreasing the number of calories you eat by 500 calories each day.   Healthy meal plan for weight management:  A healthy meal plan includes a variety of foods, contains fewer calories, and helps you stay healthy. A healthy meal plan includes the following:  Eat whole-grain foods more often.  A healthy meal plan should contain fiber. Fiber is the part of grains, fruits, and vegetables that is not broken down by your body. Whole-grain foods are healthy and provide extra fiber in your diet. Some examples of whole-grain foods are whole-wheat breads and pastas, oatmeal, brown rice, and bulgur.  Eat a variety of vegetables every day.  Include dark, leafy greens such as spinach, kale, oscar greens, and mustard greens. Eat yellow and orange vegetables such as carrots, sweet potatoes, and winter squash.   Eat a variety of fruits every day.  Choose fresh or canned fruit (canned in its own juice or light syrup) instead of juice. Fruit juice has very little or no fiber.  Eat low-fat dairy foods.  Drink fat-free (skim) milk or 1% milk. Eat fat-free yogurt and low-fat cottage cheese. Try low-fat cheeses such as mozzarella and other reduced-fat  cheeses.  Choose meat and other protein foods that are low in fat.  Choose beans or other legumes such as split peas or lentils. Choose fish, skinless poultry (chicken or turkey), or lean cuts of red meat (beef or pork). Before you cook meat or poultry, cut off any visible fat.   Use less fat and oil.  Try baking foods instead of frying them. Add less fat, such as margarine, sour cream, regular salad dressing and mayonnaise to foods. Eat fewer high-fat foods. Some examples of high-fat foods include french fries, doughnuts, ice cream, and cakes.  Eat fewer sweets.  Limit foods and drinks that are high in sugar. This includes candy, cookies, regular soda, and sweetened drinks.  Exercise:  Exercise at least 30 minutes per day on most days of the week. Some examples of exercise include walking, biking, dancing, and swimming. You can also fit in more physical activity by taking the stairs instead of the elevator or parking farther away from stores. Ask your healthcare provider about the best exercise plan for you.      © Copyright [employer] 2018 Information is for End User's use only and may not be sold, redistributed or otherwise used for commercial purposes. All illustrations and images included in CareNotes® are the copyrighted property of [employer] or [employer]

## 2024-06-03 NOTE — PROGRESS NOTES
Ambulatory Visit  Name: Trina Davis      : 1950      MRN: [id number]  Encounter Provider: Norma Villa MD  Encounter Date: 6/3/2024   Encounter department: [facility] INTERNAL MEDICINE [place]    Assessment & Plan   1. Welcome to Medicare preventive visit  Assessment & Plan:  Repeat cbc, CMP  Proceed with colonoscopy as scheduled  2. Medicare annual wellness visit, subsequent  3. Allergy, sequela  Assessment & Plan:  Dry itchy eyes in settings of seasonal allergy  Start Ketotifen eye drops  4. Transaminitis  Assessment & Plan:  No complains, most likely in settings of fatty liver  Repeat CMP  Obtain RUQ US  Follow low fat, high fiber diet. Avoid alchohol  Mild regular exercise  Follow up with GI  5. Preoperative clearance  Assessment & Plan:  Pt is cleared and can proceed with cataract surgery  6. Leukocytosis, unspecified type  Assessment & Plan:  On recent CBC WBC 11.07  Most likely In settings of recent URI  Repeat cbc   7. Anemia in stage 5 chronic kidney disease, not on chronic dialysis  (HCC)  Assessment & Plan:  Lab Results   Component Value Date    EGFR 15 2024    EGFR 17 2024    EGFR 18 2024    CREATININE 2.85 (H) 2024    CREATININE 2.65 (H) 2024    CREATININE 2.43 (H) 2024   Repeat cbc and bmp  Avoid nephrotoxic medications      Depression Screening and Follow-up Plan: Patient was screened for depression during today's encounter. They screened negative with a PHQ-2 score of 2.    Urinary Incontinence Plan of Care: counseling topics discussed: practice Kegel (pelvic floor strengthening) exercises, use restroom every 2 hours, limit alcohol, caffeine, spicy foods, and acidic foods, keeping a bladder diary, limiting fluid intake 3-4 hours before bed and preventing constipation.       Preventive health issues were discussed with patient, and age appropriate screening tests were ordered as noted in patient's After Visit Summary. Personalized health  advice and appropriate referrals for health education or preventive services given if needed, as noted in patient's After Visit Summary.    History of Present Illness     A 73 yo f with pmh of HTN, SHAYAN, COPD,T2DM, CKD who present for Medicare wellness and preop clearance.    Last clinic visit in Jan 2024.    Today: states that she is feeling well however has itchy eyes, dry skin and would like to know regarding her LFT elevation. Admits that she and getting tired fast, after 4 blocks walking.       Patient Care Team:  Tree Aguilar MD as PCP - General (Internal Medicine)  Francisco Hull MD (Urology)  Melody Singh DO as Consulting Physician (Endocrinology)  Donaldo Baires MD (Nephrology)    Review of Systems   Eyes:  Positive for redness and itching.   Respiratory: Negative.     Cardiovascular: Negative.    Gastrointestinal: Negative.    Genitourinary:  Positive for enuresis.   Psychiatric/Behavioral: Negative.       Medical History Reviewed by provider this encounter:       Annual Wellness Visit Questionnaire       Health Risk Assessment:   Patient rates overall health as poor. Patient feels that their physical health rating is slightly better. Patient is satisfied with their life. Eyesight was rated as same. Hearing was rated as same. Patient feels that their emotional and mental health rating is slightly better. Patients states they are never, rarely angry. Patient states they are often unusually tired/fatigued. Pain experienced in the last 7 days has been some. Patient's pain rating has been 3/10. Patient states that she has experienced weight loss or gain in last 6 months.     Depression Screening:   PHQ-2 Score: 2      Fall Risk Screening:   In the past year, patient has experienced: no history of falling in past year      Urinary Incontinence Screening:   Patient has leaked urine accidently in the last six months.     Home Safety:  Patient does not have trouble with stairs inside or outside of their home.  Patient has working smoke alarms and has working carbon monoxide detector. Home safety hazards include: uneven floors.     Nutrition:   Current diet is Diabetic.     Medications:   Patient is currently taking over-the-counter supplements. OTC medications include: see medication list. Patient is able to manage medications.     Activities of Daily Living (ADLs)/Instrumental Activities of Daily Living (IADLs):   Walk and transfer into and out of bed and chair?: Yes  Dress and groom yourself?: Yes    Bathe or shower yourself?: Yes    Feed yourself? Yes  Do your laundry/housekeeping?: Yes  Manage your money, pay your bills and track your expenses?: No  Make your own meals?: Yes    Do your own shopping?: No    ADL comments:  takes care household chores    Previous Hospitalizations:   Any hospitalizations or ED visits within the last 12 months?: No      Advance Care Planning:   Living will: Yes    Durable POA for healthcare: Yes    Advanced directive: Yes      PREVENTIVE SCREENINGS      Cardiovascular Screening:    General: Screening Not Indicated and History Lipid Disorder      Diabetes Screening:     General: Screening Not Indicated and History Diabetes      Colorectal Cancer Screening:     General: Screening Current      Breast Cancer Screening:     General: Screening Current      Cervical Cancer Screening:    General: Screening Not Indicated      Osteoporosis Screening:    General: Screening Not Indicated and History Osteoporosis      Lung Cancer Screening:     General: Screening Not Indicated      Hepatitis C Screening:    General: Screening Current    Screening, Brief Intervention, and Referral to Treatment (SBIRT)    Screening  Typical number of drinks in a day: 0  Typical number of drinks in a week: 0  Interpretation: Low risk drinking behavior.    AUDIT-C Screenin) How often did you have a drink containing alcohol in the past year? monthly or less  2) How many drinks did you have on a typical day when  "you were drinking in the past year? 0  3) How often did you have 6 or more drinks on one occasion in the past year? never    AUDIT-C Score: 1  Interpretation: Score 0-2 (female): Negative screen for alcohol misuse    Single Item Drug Screening:  How often have you used an illegal drug (including marijuana) or a prescription medication for non-medical reasons in the past year? never    Single Item Drug Screen Score: 0  Interpretation: Negative screen for possible drug use disorder    Social Determinants of Health     Financial Resource Strain: Low Risk  (11/10/2023)    [facility]    Overall Financial Resource Strain (CARDIA)     Difficulty of Paying Living Expenses: Not very hard   Food Insecurity: No Food Insecurity (5/27/2024)    Hunger Vital Sign     Worried About Running Out of Food in the Last Year: Never true     Ran Out of Food in the Last Year: Never true   Transportation Needs: No Transportation Needs (5/27/2024)    PRAPARE - Transportation     Lack of Transportation (Medical): No     Lack of Transportation (Non-Medical): No   Housing Stability: Low Risk  (5/27/2024)    Housing Stability Vital Sign     Unable to Pay for Housing in the Last Year: No     Number of Times Moved in the Last Year: 0     Homeless in the Last Year: No   Utilities: Not At Risk (5/27/2024)    [facility] Utilities     Threatened with loss of utilities: No     No results found.    Objective     /72 (BP Location: Left arm, Patient Position: Sitting, Cuff Size: Adult)   Pulse (!) 53   Temp 97.7 °F (36.5 °C) (Tympanic)   Resp 14   Ht 5' 4\" (1.626 m)   Wt 70.8 kg (156 lb)   SpO2 97%   BMI 26.78 kg/m²     Physical Exam  Vitals and nursing note reviewed.   Constitutional:       General: She is not in acute distress.     Appearance: She is well-developed.   HENT:      Head: Normocephalic and atraumatic.      Mouth/Throat:      Mouth: Mucous membranes are dry.   Eyes:      " Conjunctiva/sclera: Conjunctivae normal.      Comments: Left eye redness   Cardiovascular:      Rate and Rhythm: Normal rate and regular rhythm.      Heart sounds: No murmur heard.  Pulmonary:      Effort: Pulmonary effort is normal. No respiratory distress.      Breath sounds: Normal breath sounds.   Abdominal:      Palpations: Abdomen is soft.      Tenderness: There is no abdominal tenderness.   Musculoskeletal:         General: No swelling.      Cervical back: Neck supple.   Skin:     General: Skin is dry.      Capillary Refill: Capillary refill takes less than 2 seconds.   Neurological:      Mental Status: She is alert.   Psychiatric:         Mood and Affect: Mood normal.

## 2024-06-03 NOTE — PROGRESS NOTES
INTERNAL MEDICINE PRE-OPERATIVE EVALUATION  [facility] - [facility] INTERNAL MEDICINE [place]    NAME: Trina Davis  AGE: 74 y.o. SEX: female  : 1950     DATE: 6/3/2024     Internal Medicine Pre-Operative Evaluation:     Chief Complaint: Pre-operative Evaluation     Surgery: Left eye cataract  Anticipated Date of Surgery:   Referring Provider: No ref. provider found        History of Present Illness:     Trina Davis is a 74 y.o. female who presents to the office today for a preoperative consultation at the request of surgeon,  who plans on performing left cataract surgery on . Planned anesthesia is local. Patient has a bleeding risk of: no recent abnormal bleeding. Patient  didn't disclaim any  objections to receiving blood products if needed. Current anti-platelet/anti-coagulation medications that the patient is prescribed includes: Aspirin.      Assessment of Chronic Conditions:   - Diabetes Mellitus: well controlled   - COPD: stable  - Hypertension: under control  - CKD: stable     Assessment of Cardiac Risk:  Denies unstable or severe angina or MI in the last 6 weeks or history of stent placement in the last year   Denies decompensated heart failure (e.g. New onset heart failure, NYHA functional class IV heart failure, or worsening existing heart failure)  Denies significant arrhythmias such as high grade AV block, symptomatic ventricular arrhythmia, newly recognized ventricular tachycardia, supraventricular tachycardia with resting heart rate >100, or symptomatic bradycardia  Denies severe heart valve disease including aortic stenosis or symptomatic mitral stenosis     Exercise Capacity:  Able to walk 4 blocks without symptoms?: Yes  Able to walk 2 flights without symptoms?: Yes    Prior Anesthesia Reactions: No     Personal history of venous thromboembolic disease? No    History of steroid use for >2 weeks within last year? No    STOP-BANG Sleep Apnea Screening  Questionnaire:      Do you SNORE loudly (louder than talking or loud enough to be heard through closed doors)? No = 0 point   Do you often feel TIRED, fatigued, or sleepy during daytime? No = 0 point   Has anyone OBSERVED you stop breathing during your sleep? No = 0 point   Do you have or are you being treated for high blood pressure? Yes = 1 point   BMI more than 35 kg/m2? No = 0 point   AGE over 50 years old? Yes = 1 point   NECK circumference > 16 inches (40 cm)? No = 0 point   Male GENDER? No = 0 point   TOTAL SCORE 2 = LOW risk of SHAYAN       Review of Systems:     Review of Systems   Constitutional:  Positive for fatigue. Negative for chills and fever.   HENT:  Negative for ear pain and sore throat.    Eyes:  Positive for redness and itching. Negative for pain and visual disturbance.   Respiratory:  Negative for cough and shortness of breath.    Cardiovascular:  Negative for chest pain and palpitations.   Gastrointestinal:  Negative for abdominal pain and vomiting.   Genitourinary:  Positive for enuresis. Negative for dysuria and hematuria.   Musculoskeletal:  Negative for arthralgias and back pain.   Skin:  Negative for color change and rash.        Dry skin   Neurological:  Negative for seizures and syncope.   All other systems reviewed and are negative.       Problem List:     Patient Active Problem List   Diagnosis    HLD (hyperlipidemia)    SHAYAN (obstructive sleep apnea)    History of recurrent UTIs    Acquired hypothyroidism    RLS (restless legs syndrome)    Preoperative clearance    Vitamin D deficiency    Fibromyalgia    Type 2 diabetes mellitus with stage 4 chronic kidney disease, with long-term current use of insulin ([facility])    Gastroesophageal reflux disease without esophagitis    Leukocytosis    Gastroparesis diabeticorum  ([facility])    Epigastric pain    COPD (chronic obstructive pulmonary disease) (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    Fracture of orbital floor, right side, initial  encounter for closed fracture (HCC)    Seasonal allergies    Osteoporosis    Palpitations    Varicose veins of both lower extremities with pain    Postural dizziness with presyncope    Mild dementia without behavioral disturbance, psychotic disturbance, mood disturbance, or anxiety (HCC)    Parenchymal renal hypertension    Secondary hyperparathyroidism of renal origin (HCC)    B12 deficiency    History of cerebrovascular accident (CVA) due to ischemia    Mixed stress and urge urinary incontinence    Slow transit constipation    CKD (chronic kidney disease) stage 5, GFR less than 15 ml/min (HCC)    Anemia in stage 5 chronic kidney disease, not on chronic dialysis  (HCC)    Diabetic nephropathy associated with type 2 diabetes mellitus (HCC)    Welcome to Medicare preventive visit    Allergies    Transaminitis        Allergies:     Allergies   Allergen Reactions    Ciprofloxacin Hives    Sulfamethoxazole-Trimethoprim Tongue Swelling        Current Medications:       Current Outpatient Medications:     acetaminophen (TYLENOL) 500 mg tablet, Take 1,000 mg by mouth every 6 (six) hours as needed for mild pain, Disp: , Rfl:     albuterol (Ventolin HFA) 90 mcg/act inhaler, Inhale 2 puffs every 6 (six) hours as needed for wheezing, Disp: 54 g, Rfl: 0    amLODIPine (NORVASC) 5 mg tablet, Take 2 tablets (10 mg total) by mouth daily, Disp: 180 tablet, Rfl: 1    aspirin (ECOTRIN LOW STRENGTH) 81 mg EC tablet, Take 1 tablet (81 mg total) by mouth daily Do not start before October 31, 2023., Disp: , Rfl: 0    B-D ULTRAFINE III SHORT PEN 31G X 8 MM MISC, , Disp: , Rfl: 3    Calcium Citrate 250 MG TABS, TAKE 2 TABLETS (500MG) 2 TIMES A DAY (Patient taking differently: Take 500 mg by mouth in the morning), Disp: 360 tablet, Rfl: 1    Coenzyme Q10 (CoQ10) 100 MG CAPS, Take 100 mg by mouth in the morning Bed time, Disp: , Rfl:     Cyanocobalamin (Vitamin B12) 1000 MCG TBCR, Take 1,000 mcg by mouth once a week, Disp: , Rfl:      escitalopram (LEXAPRO) 10 mg tablet, Take 10 mg by mouth daily, Disp: , Rfl:     famotidine (PEPCID) 10 mg tablet, Take 1 tablet (10 mg total) by mouth daily Do not start before September 13, 2023., Disp: 30 tablet, Rfl: 0    hydrALAZINE (APRESOLINE) 25 mg tablet, Take 1 tablet (25 mg total) by mouth 2 (two) times a day, Disp: , Rfl:     Icosapent Ethyl 1 g CAPS, Take 2 capsules (2 g total) by mouth 2 (two) times a day, Disp: 120 capsule, Rfl: 3    insulin aspart (NovoLOG) 100 units/mL injection, Inject under the skin 3 (three) times a day before meals 5units, 5units, 10units., Disp: , Rfl:     insulin detemir (Levemir FlexTouch) 100 Units/mL injection pen, Inject 20 Units under the skin every evening, Disp: , Rfl:     levothyroxine 100 mcg tablet, TAKE 1 TABLET BY MOUTH EVERY DAY (Patient taking differently: Take 124 mcg by mouth daily), Disp: 90 tablet, Rfl: 1    methenamine hippurate (HIPREX) 1 g tablet, Take 1 tablet (1 g total) by mouth 2 (two) times a day with meals, Disp: 180 tablet, Rfl: 3    metoprolol tartrate (LOPRESSOR) 50 mg tablet, Take 1 tablet (50 mg total) by mouth every 12 (twelve) hours, Disp: 180 tablet, Rfl: 3    pantoprazole (PROTONIX) 40 mg tablet, Take 1 tablet (40 mg total) by mouth daily, Disp: 180 tablet, Rfl: 1    pramipexole (MIRAPEX) 0.25 mg tablet, Take 1 tablet (0.25 mg total) by mouth daily at bedtime, Disp: 90 tablet, Rfl: 0    Probiotic Product (PROBIOTIC BLEND PO), Take by mouth, Disp: , Rfl:     Restasis 0.05 % ophthalmic emulsion, Administer 1 drop to both eyes every 12 (twelve) hours, Disp: , Rfl:     rosuvastatin (CRESTOR) 5 mg tablet, Take 1 tablet (5 mg total) by mouth daily, Disp: 90 tablet, Rfl: 3    torsemide (DEMADEX) 10 mg tablet, Take 10 mg. Daily except Monday, Wednesday, Friday 20 mg., Disp: 50 tablet, Rfl: 4    Vitamin D, Cholecalciferol, 25 MCG (1000 UT) TABS, Take 2,000 Units by mouth in the morning, Disp: , Rfl:     Icosapent Ethyl (Vascepa) 0.5 g CAPS, Take 2 g  "by mouth 2 (two) times a day, Disp: 120 capsule, Rfl: 3    Icosapent Ethyl (Vascepa) 0.5 g CAPS, Take 0.5 mg by mouth 2 (two) times a day, Disp: 180 capsule, Rfl: 3    NON FORMULARY, , Disp: , Rfl:     polyethylene glycol (GOLYTELY) 4000 mL solution, Take 4,000 mL by mouth once for 1 dose, Disp: 4000 mL, Rfl: 0     Past History:     Past Medical History:   Diagnosis Date    Actinic keratosis     Acute cystitis without hematuria 04/28/2023    Acute cystitis without hematuria 04/28/2023    Acute respiratory failure with hypoxia (HCC)     Acute-on-chronic kidney injury  (HCC)     NIKOS (acute kidney injury) (HCC) 05/08/2020    Allergic     Allergic rhinitis     Ambulatory dysfunction 10/22/2020    AMS (altered mental status) 07/14/2020    Anesthesia     pt reports \"had to use double lumen endo tube for hiatal hernia repair/so surgeon could get to where he needed to work\"    Arthritis     Aspiration into airway     Michael esophagus 1993    Lot of stress with children    Basal cell carcinoma 2007    left cheek     BCC (basal cell carcinoma) 05/27/2021    Left Nasal tip    Breast discharge 06/19/2023    Breast pain 06/19/2023    Cancer (HCC)     squamous cell cancer on forhead    Cancer (HCC)     basal cell on nose     Cholelithiasis     Chronic kidney disease 2000, 2018    Stones, kidney disease stage 4    Chronic pain disorder     bilat feet and joint pain on occas    Colon polyp     Confusion 10/30/2023    Constipation     COPD (chronic obstructive pulmonary disease) (HCC)     COVID-19 08/2021    recovered at home/did receive monoclonal infusion    COVID-19 virus infection 08/16/2021    Dental bridge present     Depression     Diabetes mellitus (HCC)     Type 2    Diabetic neuropathy (HCC)     Difficulty walking 2017    Disease of thyroid gland     Dyspnea     Elevated liver enzymes 04/04/2023    Epigastric abdominal pain with severe diabetic gastroparesis, status post EGD/Botox injection, 5/14 05/17/2021    Facial " abrasion, initial encounter 03/22/2023    Fall 03/22/2023    Family history of thyroid problem     Fatty liver     Fibromyalgia, primary     Fracture of orbital floor, blow-out, right, closed, with routine healing, subsequent encounter 03/22/2023    GERD (gastroesophageal reflux disease)     Heart burn     Hiatal hernia     History of cholecystectomy 10/27/2023    History of colon polyps 07/30/2021    History of kidney stones 09/29/2020    Hx of recurrent kidney stones    History of kidney stones 09/29/2020    Hx of recurrent kidney stones  Formatting of this note might be different from the original. Hx of recurrent kidney stones  Last Assessment & Plan:  Formatting of this note might be different from the original. We will set her up for follow-up in 3 months with a KUB prior.  She knows to call if she has any kidney stone type pain in the meantime.    History of Nissen fundoplication 10/27/2023    History of pneumonia     History of repair of hiatal hernia 05/03/2020    Hyperlipidemia     Hyperplasia, parathyroid (HCC)     Hypertension     Hypertensive urgency 10/27/2023    Hyponatremia 04/26/2023    Hypotension 04/13/2022    Kidney problem     Kidney stone     Left hip pain 10/05/2023    Leukocytosis 07/14/2020    Memory loss Julu2 2020    Motion sickness     Motion sickness     Multiple closed fractures of ribs of right side 03/22/2023    Nasal bones, closed fracture 03/22/2023    Neck pain     Obesity 1978    Obesity (BMI 30.0-34.9)     Other chest pain 09/13/2021    Other fatigue 09/21/2023    Peripheral neuropathy     Pollen allergies     RLS (restless legs syndrome)     S/P foot surgery 07/20/2022    SCC (squamous cell carcinoma) 05/04/2021    left mid forehead    SCC (squamous cell carcinoma) 2023    chest    Seasonal allergies     Shingles 01 05 23    Sleep apnea     has inspire    Squamous cell skin cancer 2007    left cheek     Stroke (HCC)     Swollen ankles     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, left      great toe fusion    Urinary incontinence     Urinary tract infection 03/28/2022    Wears glasses         Past Surgical History:   Procedure Laterality Date    ABDOMINAL SURGERY  1997,2015,1972    Nissen x2 1972 tubal ligarion    ARTHROSCOPY KNEE Right     BREAST BIOPSY      stereotactic-benign    BREAST BIOPSY      stereo-benign    BREAST CYST EXCISION Bilateral     benign    BREAST EXCISIONAL BIOPSY      unknown date-benign    BREAST EXCISIONAL BIOPSY      unknown date-benign    BREAST EXCISIONAL BIOPSY      unknown date-benign    BREAST EXCISIONAL BIOPSY      unknown age-benign    CARDIAC CATHETERIZATION      CATARACT EXTRACTION Right     CHOLECYSTECTOMY      COLONOSCOPY      EXAMINATION UNDER ANESTHESIA N/A 06/24/2021    Procedure: EXAM UNDER ANESTHESIA (EUA), DISE;  Surgeon: Gregory Maier MD;  [facility];  Service: ENT    HIATAL HERNIA REPAIR      KNEE SURGERY Right     Torn maniscus lap surg    LIPOSUCTION      LYMPH NODE BIOPSY      MOHS SURGERY  05/20/2021    left mid forehead-Mickey    MOHS SURGERY Left 05/27/2021    Left nasal tip- mickey     KS LIGATION/BIOPSY TEMPORAL ARTERY Left 01/05/2023    Procedure: BIOPSY ARTERY TEMPORAL;  Surgeon: Alec Dennis DO;  [facility]: [facility];  Service: Vascular    KS OPEN IMPLANTATION CRANIAL NERVE BOOM & PULSE GEN N/A 11/10/2021    Procedure: INSERTION UPPER AIRWAY STIMULATOR, INSPIRE IMPLANT;  Surgeon: Gregory Maier MD;  Location: [facility];  Service: ENT    KS UNLISTED PROCEDURE FOOT/TOES Right 07/19/2022    Procedure: 1st metatarsal phalangeal joint fusion;  Surgeon: Dede Bonner DPM;  Location: [facility];  Service: Podiatry    REDUCTION MAMMAPLASTY Bilateral 2000    REDUCTION MAMMAPLASTY      SKIN BIOPSY      SKIN CANCER EXCISION  2007    squamous cell carcinoma     SKIN CANCER EXCISION  2007    basal cell carcinoma    SKIN CANCER EXCISION  2023    SCCIS chest    SQUAMOUS CELL CARCINOMA EXCISION      TOE SURGERY Right 08/04/2022     Right Great Toe Fusion    TONSILLECTOMY      TUBAL LIGATION      UPPER GASTROINTESTINAL ENDOSCOPY      US GUIDED BREAST BIOPSY RIGHT COMPLETE Right 2022        Family History   Problem Relation Age of Onset    Heart disease Mother     Depression Mother     Hypertension Mother     COPD Mother     Hearing loss Mother     Anxiety disorder Mother     Heart disease Father     Lung cancer Father 70        Smoker     Cancer Father         brain    Alcohol abuse Father     Dementia Father     Hypertension Father     Thyroid disease Father     COPD Father     Arthritis Father     Brain cancer Father 74    Hypertension Sister     Diabetes Sister     Heart disease Sister     Thyroid disease Sister     Cancer Sister         Lympoma, lung    Lung cancer Sister 79    Anxiety disorder Sister     Migraines Sister     Hypertension Brother     Diabetes Brother     Cancer Brother         Throat    Dementia Brother     Stroke Brother     Hypertension Brother     Heart disease Brother     Diabetes Brother     Hypertension Brother     No Known Problems Son     No Known Problems Son     Brain cancer Paternal Aunt         unknown age    Diabetes Sister     Hypertension Sister     Diabetes Brother     Breast cancer Neg Hx         Social History     Socioeconomic History    Marital status: /Civil Union     Spouse name: Not on file    Number of children: Not on file    Years of education: Not on file    Highest education level: Not on file   Occupational History    Occupation: RETIRED   Tobacco Use    Smoking status: Former     Current packs/day: 0.00     Average packs/day: 2.0 packs/day for 10.0 years (20.0 ttl pk-yrs)     Types: Cigarettes     Start date: 1966     Quit date: 1976     Years since quittin.4     Passive exposure: Past    Smokeless tobacco: Never    Tobacco comments:     Smoked 2 pack a day   Vaping Use    Vaping status: Never Used   Substance and Sexual Activity    Alcohol use: Yes     Comment: 1 or 2  a year    Drug use: No    Sexual activity: Not Currently     Partners: Male     Birth control/protection: Abstinence, Post-menopausal, Female Sterilization     Comment: defer   Other Topics Concern    Not on file   Social History Narrative    ** Merged History Encounter **          Social Determinants of Health     Financial Resource Strain: Low Risk  (11/10/2023)    [facility]    Overall Financial Resource Strain (CARDIA)     Difficulty of Paying Living Expenses: Not very hard   Food Insecurity: No Food Insecurity (5/27/2024)    Hunger Vital Sign     Worried About Running Out of Food in the Last Year: Never true     Ran Out of Food in the Last Year: Never true   Transportation Needs: No Transportation Needs (5/27/2024)    PRAPARE - Transportation     Lack of Transportation (Medical): No     Lack of Transportation (Non-Medical): No   Physical Activity: Inactive (11/10/2023)    Received from [facility]    Exercise Vital Sign     Days of Exercise per Week: 0 days     Minutes of Exercise per Session: 0 min   Stress: Stress Concern Present (11/10/2023)    [facility]    [employer] [employer] - Occupational Stress Questionnaire     Feeling of Stress : To some extent   Social Connections: Moderately Integrated (11/10/2023)    [facility]    Social Connection and Isolation Panel [NHANES]     Frequency of Communication with Friends and Family: More than three times a week     Frequency of Social Gatherings with Friends and Family: Twice a week     Attends Muslim Services: More than 4 times per year     Active Member of Clubs or Organizations: No     Attends Club or Organization Meetings: Never     Marital Status:    Intimate Partner Violence: Not At Risk (11/10/2023)    [facility]    Humiliation, Afraid, Rape, and Kick questionnaire     Fear of Current or Ex-Partner: No     Emotionally Abused: No     Physically Abused: No     Sexually Abused: No   Housing Stability: Low Risk  (5/27/2024)    Housing Stability Vital Sign     Unable to Pay for Housing in the Last Year: No     Number of Times Moved in the Last Year: 0     Homeless in the Last Year: No        Physical Exam:      /72 (BP Location: Left arm, Patient Position: Sitting, Cuff Size: Adult)   Pulse (!) 53   Temp 97.7 °F (36.5 °C) (Tympanic)   Resp 14   Ht 5' 4\" (1.626 m)   Wt 70.8 kg (156 lb)   SpO2 97%   BMI 26.78 kg/m²     Physical Exam  Constitutional:       Appearance: Normal appearance. She is not ill-appearing.   HENT:      Head: Normocephalic and atraumatic.      Nose: Nose normal.   Eyes:      Conjunctiva/sclera: Conjunctivae normal.      Comments: Left sclera is injected    Cardiovascular:      Rate and Rhythm: Normal rate and regular rhythm.      Pulses: Normal pulses.      Heart sounds: Normal heart sounds.   Pulmonary:      Effort: Pulmonary effort is normal. No respiratory distress.      Breath sounds: No wheezing or rales.   Abdominal:      General: Abdomen is flat. Bowel sounds are normal. There is no distension.      Palpations: Abdomen is soft.      Tenderness: There is no abdominal tenderness. There is no guarding.   Musculoskeletal:      Cervical back: Normal range of motion.      Right lower leg: No edema.      Left lower leg: No edema.   Skin:     General: Skin is warm and dry.   Neurological:      General: No focal deficit present.      Mental Status: She is alert.   Psychiatric:         Mood and Affect: Mood normal.         Behavior: Behavior normal.         Thought Content: Thought content normal.         Judgment: Judgment normal.           Data:     Pre-operative work-up    Laboratory Results: I have personally reviewed the pertinent laboratory " results/reports     EKG: I have personally reviewed pertinent films in PACS    Chest x-ray: I have personally reviewed pertinent reports.      Previous cardiopulmonary studies within the past year:  Echocardiogram: reviewed  Cardiac Catheterization: n/a  Stress Test: n/a  Pulmonary Function Testing: reviewed       Assessment:     1. Welcome to Medicare preventive visit        2. Medicare annual wellness visit, subsequent        3. Allergy, sequela        4. Transaminitis        5. Preoperative clearance        6. Leukocytosis, unspecified type        7. Anemia in stage 5 chronic kidney disease, not on chronic dialysis  (HCC)             Plan:     74 y.o. female with planned surgery: left eye cataract      Cardiac Risk Estimation: per the Revised Cardiac Risk Index (Circ. 100:1043, 1999), the patient's risk factors for cardiac complications include on insulin, putting her in: RCI RISK CLASS II (1 risk factor, risk of major cardiac compl. appr. 1.3%).    1. Further preoperative workup as follows:   -- no need      2. Medication Management/Recommendations:    Hold Novolog on the day of surgery, restart following day    3. Prophylaxis for cardiac events with perioperative beta-blockers: already on B-blockers.    4. Patient requires further consultation with: None    Clearance  Patient is CLEARED for surgery without any additional cardiac testing.     Norma Villa MD  [facility] [place]  [address] [address]  Phone#  [phone]  Fax#  [phone]

## 2024-06-03 NOTE — ASSESSMENT & PLAN NOTE
No complains, most likely in settings of fatty liver  Repeat CMP  Obtain RUQ US  Follow low fat, high fiber diet. Avoid alchohol  Mild regular exercise  Follow up with GI

## 2024-06-04 ENCOUNTER — APPOINTMENT (OUTPATIENT)
Dept: LAB | Facility: AMBULARY SURGERY CENTER | Age: 74
End: 2024-06-04
Payer: MEDICARE

## 2024-06-04 ENCOUNTER — TELEPHONE (OUTPATIENT)
Dept: ADMINISTRATIVE | Facility: OTHER | Age: 74
End: 2024-06-04

## 2024-06-04 DIAGNOSIS — K76.89 OTHER SPECIFIED DISEASES OF LIVER: ICD-10-CM

## 2024-06-04 DIAGNOSIS — R74.8 ELEVATED LIVER ENZYMES: ICD-10-CM

## 2024-06-04 DIAGNOSIS — B17.8 OTHER SPECIFIED ACUTE VIRAL HEPATITIS: ICD-10-CM

## 2024-06-04 DIAGNOSIS — R74.8 ELEVATED LIVER ENZYMES: Primary | ICD-10-CM

## 2024-06-04 LAB
ANA SER QL IA: NEGATIVE
GLIADIN PEPTIDE IGA SER-ACNC: <0.2 U/ML
GLIADIN PEPTIDE IGA SER-ACNC: NEGATIVE
GLIADIN PEPTIDE IGG SER-ACNC: <0.4 U/ML
GLIADIN PEPTIDE IGG SER-ACNC: NEGATIVE
HAV IGM SER QL: NORMAL
HBV CORE IGM SER QL: NORMAL
HBV SURFACE AG SER QL: NORMAL
HCV AB SER QL: NORMAL
IGA SERPL-MCNC: 200 MG/DL (ref 66–433)
IGA SERPL-MCNC: 200 MG/DL (ref 66–433)
IGG SERPL-MCNC: 460 MG/DL (ref 635–1741)
IGM SERPL-MCNC: 130 MG/DL (ref 45–281)
INR PPP: 0.95 (ref 0.84–1.19)
PROTHROMBIN TIME: 12.6 SECONDS (ref 11.6–14.5)
TTG IGA SER-ACNC: <0.5 U/ML
TTG IGA SER-ACNC: NEGATIVE
TTG IGG SER-ACNC: <0.8 U/ML
TTG IGG SER-ACNC: NEGATIVE

## 2024-06-04 PROCEDURE — 86038 ANTINUCLEAR ANTIBODIES: CPT

## 2024-06-04 PROCEDURE — 86364 TISS TRNSGLTMNASE EA IG CLAS: CPT

## 2024-06-04 PROCEDURE — 80074 ACUTE HEPATITIS PANEL: CPT

## 2024-06-04 PROCEDURE — 86381 MITOCHONDRIAL ANTIBODY EACH: CPT

## 2024-06-04 PROCEDURE — 36415 COLL VENOUS BLD VENIPUNCTURE: CPT

## 2024-06-04 PROCEDURE — 82103 ALPHA-1-ANTITRYPSIN TOTAL: CPT

## 2024-06-04 PROCEDURE — 85610 PROTHROMBIN TIME: CPT

## 2024-06-04 PROCEDURE — 82784 ASSAY IGA/IGD/IGG/IGM EACH: CPT

## 2024-06-04 PROCEDURE — 82785 ASSAY OF IGE: CPT

## 2024-06-04 PROCEDURE — 82390 ASSAY OF CERULOPLASMIN: CPT

## 2024-06-04 PROCEDURE — 86258 DGP ANTIBODY EACH IG CLASS: CPT

## 2024-06-04 PROCEDURE — 86015 ACTIN ANTIBODY EACH: CPT

## 2024-06-04 NOTE — TELEPHONE ENCOUNTER
----- Message from Debbie MCNEILL sent at 6/3/2024  2:36 PM EDT -----  Regarding: care gap request  06/03/24 2:36 PM    Hello, our patient attached above has had Diabetic Eye Exam completed/performed. Please assist in updating the patient chart by pulling the document from the Media Tab. The date of service is 03/21/24.     Thank you,  Debbie De Anda   INTERNAL MED [place]

## 2024-06-04 NOTE — TELEPHONE ENCOUNTER
Upon review of the In Basket request we have found/obtained the documentation. After careful review of the document we are unable to complete this request for Diabetic Eye Exam because the documentation does not have the result(s) needed to close the requested care gap(s).    Any additional questions or concerns should be emailed to the Practice Liaisons via the appropriate education email address, please do not reply via In Basket.    Thank you  Rocio Cross MA   [facility]

## 2024-06-04 NOTE — ASSESSMENT & PLAN NOTE
Lab Results   Component Value Date    EGFR 15 05/30/2024    EGFR 17 05/16/2024    EGFR 18 05/06/2024    CREATININE 2.85 (H) 05/30/2024    CREATININE 2.65 (H) 05/16/2024    CREATININE 2.43 (H) 05/06/2024   Repeat cbc and bmp  Avoid nephrotoxic medications

## 2024-06-05 LAB
A1AT SERPL-MCNC: 150 MG/DL (ref 101–187)
ACTIN IGG SERPL-ACNC: 3 UNITS (ref 0–19)
CERULOPLASMIN SERPL-MCNC: 20.9 MG/DL (ref 19–39)
MITOCHONDRIA M2 IGG SER-ACNC: 23.2 UNITS (ref 0–20)

## 2024-06-06 ENCOUNTER — APPOINTMENT (OUTPATIENT)
Dept: RADIOLOGY | Facility: HOSPITAL | Age: 74
End: 2024-06-06
Payer: MEDICARE

## 2024-06-06 ENCOUNTER — HOSPITAL ENCOUNTER (OUTPATIENT)
Dept: ULTRASOUND IMAGING | Facility: HOSPITAL | Age: 74
Discharge: HOME | End: 2024-06-06
Payer: MEDICARE

## 2024-06-06 DIAGNOSIS — R74.01 TRANSAMINITIS: ICD-10-CM

## 2024-06-06 LAB — TOTAL IGE SMQN RAST: 20.9 KU/L (ref 0–113)

## 2024-06-06 PROCEDURE — 76705 ECHO EXAM OF ABDOMEN: CPT

## 2024-06-12 DIAGNOSIS — F03.A0 MILD DEMENTIA WITHOUT BEHAVIORAL DISTURBANCE, PSYCHOTIC DISTURBANCE, MOOD DISTURBANCE, OR ANXIETY, UNSPECIFIED DEMENTIA TYPE (HCC): Primary | Chronic | ICD-10-CM

## 2024-06-12 RX ORDER — ESCITALOPRAM OXALATE 10 MG/1
10 TABLET ORAL DAILY
Qty: 30 TABLET | Refills: 2 | Status: SHIPPED | OUTPATIENT
Start: 2024-06-12 | End: 2024-09-10

## 2024-06-15 DIAGNOSIS — G25.81 RLS (RESTLESS LEGS SYNDROME): ICD-10-CM

## 2024-06-15 RX ORDER — PRAMIPEXOLE DIHYDROCHLORIDE 0.25 MG/1
0.25 TABLET ORAL
Qty: 90 TABLET | Refills: 1 | Status: SHIPPED | OUTPATIENT
Start: 2024-06-15

## 2024-06-16 ENCOUNTER — HOSPITAL ENCOUNTER (EMERGENCY)
Facility: HOSPITAL | Age: 74
Discharge: HOME | End: 2024-06-17
Attending: STUDENT IN AN ORGANIZED HEALTH CARE EDUCATION/TRAINING PROGRAM
Payer: MEDICARE

## 2024-06-16 ENCOUNTER — APPOINTMENT (EMERGENCY)
Dept: CT IMAGING | Facility: HOSPITAL | Age: 74
End: 2024-06-16
Payer: MEDICARE

## 2024-06-16 ENCOUNTER — APPOINTMENT (EMERGENCY)
Dept: RADIOLOGY | Facility: HOSPITAL | Age: 74
End: 2024-06-16
Payer: MEDICARE

## 2024-06-16 DIAGNOSIS — J32.9 SINUSITIS: Primary | ICD-10-CM

## 2024-06-16 DIAGNOSIS — R20.0 FACIAL NUMBNESS: ICD-10-CM

## 2024-06-16 LAB
ANION GAP SERPL CALCULATED.3IONS-SCNC: 10 MMOL/L (ref 4–13)
BASOPHILS # BLD AUTO: 0.03 THOUSANDS/ÂΜL (ref 0–0.1)
BASOPHILS NFR BLD AUTO: 0 % (ref 0–1)
BUN SERPL-MCNC: 74 MG/DL (ref 5–25)
CALCIUM SERPL-MCNC: 8.8 MG/DL (ref 8.4–10.2)
CHLORIDE SERPL-SCNC: 103 MMOL/L (ref 96–108)
CO2 SERPL-SCNC: 22 MMOL/L (ref 21–32)
CREAT SERPL-MCNC: 3.17 MG/DL (ref 0.6–1.3)
EOSINOPHIL # BLD AUTO: 0.31 THOUSAND/ÂΜL (ref 0–0.61)
EOSINOPHIL NFR BLD AUTO: 3 % (ref 0–6)
ERYTHROCYTE [DISTWIDTH] IN BLOOD BY AUTOMATED COUNT: 13.2 % (ref 11.6–15.1)
GFR SERPL CREATININE-BSD FRML MDRD: 13 ML/MIN/1.73SQ M
GLUCOSE SERPL-MCNC: 128 MG/DL (ref 65–140)
HCT VFR BLD AUTO: 35.3 % (ref 34.8–46.1)
HGB BLD-MCNC: 11.3 G/DL (ref 11.5–15.4)
IMM GRANULOCYTES # BLD AUTO: 0.09 THOUSAND/UL (ref 0–0.2)
IMM GRANULOCYTES NFR BLD AUTO: 1 % (ref 0–2)
LYMPHOCYTES # BLD AUTO: 1.91 THOUSANDS/ÂΜL (ref 0.6–4.47)
LYMPHOCYTES NFR BLD AUTO: 16 % (ref 14–44)
MCH RBC QN AUTO: 29.6 PG (ref 26.8–34.3)
MCHC RBC AUTO-ENTMCNC: 32 G/DL (ref 31.4–37.4)
MCV RBC AUTO: 92 FL (ref 82–98)
MONOCYTES # BLD AUTO: 0.59 THOUSAND/ÂΜL (ref 0.17–1.22)
MONOCYTES NFR BLD AUTO: 5 % (ref 4–12)
NEUTROPHILS # BLD AUTO: 8.79 THOUSANDS/ÂΜL (ref 1.85–7.62)
NEUTS SEG NFR BLD AUTO: 75 % (ref 43–75)
NRBC BLD AUTO-RTO: 0 /100 WBCS
PLATELET # BLD AUTO: 248 THOUSANDS/UL (ref 149–390)
PMV BLD AUTO: 11.7 FL (ref 8.9–12.7)
POTASSIUM SERPL-SCNC: 3.8 MMOL/L (ref 3.5–5.3)
RBC # BLD AUTO: 3.82 MILLION/UL (ref 3.81–5.12)
SODIUM SERPL-SCNC: 135 MMOL/L (ref 135–147)
WBC # BLD AUTO: 11.72 THOUSAND/UL (ref 4.31–10.16)

## 2024-06-16 PROCEDURE — 80048 BASIC METABOLIC PNL TOTAL CA: CPT | Performed by: STUDENT IN AN ORGANIZED HEALTH CARE EDUCATION/TRAINING PROGRAM

## 2024-06-16 PROCEDURE — 85025 COMPLETE CBC W/AUTO DIFF WBC: CPT | Performed by: STUDENT IN AN ORGANIZED HEALTH CARE EDUCATION/TRAINING PROGRAM

## 2024-06-16 PROCEDURE — 84443 ASSAY THYROID STIM HORMONE: CPT

## 2024-06-16 PROCEDURE — 36000 PLACE NEEDLE IN VEIN: CPT | Performed by: STUDENT IN AN ORGANIZED HEALTH CARE EDUCATION/TRAINING PROGRAM

## 2024-06-16 PROCEDURE — 83735 ASSAY OF MAGNESIUM: CPT

## 2024-06-16 PROCEDURE — 76942 ECHO GUIDE FOR BIOPSY: CPT | Performed by: STUDENT IN AN ORGANIZED HEALTH CARE EDUCATION/TRAINING PROGRAM

## 2024-06-16 PROCEDURE — 99285 EMERGENCY DEPT VISIT HI MDM: CPT | Performed by: STUDENT IN AN ORGANIZED HEALTH CARE EDUCATION/TRAINING PROGRAM

## 2024-06-16 PROCEDURE — 0241U HB NFCT DS VIR RESP RNA 4 TRGT: CPT

## 2024-06-16 PROCEDURE — 81001 URINALYSIS AUTO W/SCOPE: CPT

## 2024-06-16 PROCEDURE — 70486 CT MAXILLOFACIAL W/O DYE: CPT

## 2024-06-16 PROCEDURE — 99284 EMERGENCY DEPT VISIT MOD MDM: CPT

## 2024-06-16 PROCEDURE — 71045 X-RAY EXAM CHEST 1 VIEW: CPT

## 2024-06-16 PROCEDURE — 36415 COLL VENOUS BLD VENIPUNCTURE: CPT | Performed by: EMERGENCY MEDICINE

## 2024-06-17 VITALS
OXYGEN SATURATION: 96 % | SYSTOLIC BLOOD PRESSURE: 178 MMHG | TEMPERATURE: 97.9 F | HEART RATE: 62 BPM | DIASTOLIC BLOOD PRESSURE: 79 MMHG | RESPIRATION RATE: 18 BRPM

## 2024-06-17 LAB
BACTERIA UR QL AUTO: ABNORMAL /HPF
BILIRUB UR QL STRIP: NEGATIVE
CLARITY UR: CLEAR
COLOR UR: ABNORMAL
FLUAV RNA RESP QL NAA+PROBE: NEGATIVE
FLUBV RNA RESP QL NAA+PROBE: NEGATIVE
GLUCOSE UR STRIP-MCNC: NEGATIVE MG/DL
HGB UR QL STRIP.AUTO: NEGATIVE
KETONES UR STRIP-MCNC: NEGATIVE MG/DL
LEUKOCYTE ESTERASE UR QL STRIP: NEGATIVE
MAGNESIUM SERPL-MCNC: 2.4 MG/DL (ref 1.9–2.7)
NITRITE UR QL STRIP: NEGATIVE
NON-SQ EPI CELLS URNS QL MICRO: ABNORMAL /HPF
PH UR STRIP.AUTO: 6 [PH]
PROT UR STRIP-MCNC: ABNORMAL MG/DL
RBC #/AREA URNS AUTO: ABNORMAL /HPF
RSV RNA RESP QL NAA+PROBE: NEGATIVE
SARS-COV-2 RNA RESP QL NAA+PROBE: NEGATIVE
SP GR UR STRIP.AUTO: 1.01 (ref 1–1.03)
TSH SERPL DL<=0.05 MIU/L-ACNC: 2.84 UIU/ML (ref 0.45–4.5)
UROBILINOGEN UR STRIP-ACNC: <2 MG/DL
WBC #/AREA URNS AUTO: ABNORMAL /HPF

## 2024-06-17 PROCEDURE — 96360 HYDRATION IV INFUSION INIT: CPT

## 2024-06-17 RX ORDER — AMOXICILLIN AND CLAVULANATE POTASSIUM 875; 125 MG/1; MG/1
1 TABLET, FILM COATED ORAL EVERY 12 HOURS
Qty: 14 TABLET | Refills: 0 | Status: SHIPPED | OUTPATIENT
Start: 2024-06-17 | End: 2024-06-24

## 2024-06-17 RX ADMIN — SODIUM CHLORIDE 500 ML: 0.9 INJECTION, SOLUTION INTRAVENOUS at 00:32

## 2024-06-17 NOTE — ED ATTENDING ATTESTATION
6/16/2024  I, Hossein Lazo DO, saw and evaluated the patient. I have discussed the patient with the resident/non-physician practitioner and agree with the resident's/non-physician practitioner's findings, Plan of Care, and MDM as documented in the resident's/non-physician practitioner's note, except where noted. All available labs and Radiology studies were reviewed.  I was present for key portions of any procedure(s) performed by the resident/non-physician practitioner and I was immediately available to provide assistance.       At this point I agree with the current assessment done in the Emergency Department.  I have conducted an independent evaluation of this patient a history and physical is as follows:    74-year-old female presents to the ED for evaluation of left facial pain/numbness, blood-tinged sputum.  Patient states that a few days ago she noticed noted sensation to the right upper lip which has since radiated towards the right cheek.  Has had increased congestion recently as well and blood-tinged sputum.  No fevers.  No neck pain or stiffness.  No shortness of breath.  No visual changes or pain with extraocular movements.  On exam, is resting comfortably no acute distress, speaking full sentences with no respiratory difficulty, pulse is regular with normal rate, 5 out of 5 muscle strength in all extremities, gross intact sensation light touch in all extremities, she reports decrease sensation light touch over the right V2 distribution otherwise cranial nerves II through XII are grossly intact.  Labs show mild leukocytosis 11.72, stable hemoglobin compared to prior labs, normal platelet count, no acute electrolyte abnormalities, impaired renal function with creatinine of 3.17 which has been worsening over the past few months, urine is without concern for acute infection, negative COVID/flu/RSV, normal TSH.  Chest x-ray shows no acute cardiopulmonary pathology as interpreted by myself pending final  radiology read.  CT facial bones was obtained showing no acute pathology per the preliminary read telemetry radiology read.  Will start the patient on antibiotics given symptoms concerning for possible sinusitis.  Resident discussed results with patient.  Stable for discharge and agreeable to the plan.  Strict return ED precautions given    ED Course         Critical Care Time  Procedures

## 2024-06-17 NOTE — ED PROVIDER NOTES
History  Chief Complaint   Patient presents with    Facial Numbness     Pt states she began with R sided facial numbness 3 days ago. States she was prompted to come to the ER due to having increased sinus congestion and blood in her sputum. Denies other weakness, gait disturbances, or numbness / tingling.     74-year-old female with extensive medical history including recurrent UTIs, diabetes, hypertension, hyperlipidemia, presents with 3 weeks of slowly worsening decreased sensation over right side of lip and now into right maxilla.  Patient states she also noted 1 episode of bloody sputum, which temporarily resolved, but her during evaluation she had another episode.  She also noted some sinus pressure on the right side.  No other headache.  No nausea/vomiting, fever/chills, vision changes.  She does have a chronic cough that she states slightly worsened since onset of the numbness.  Denies chest pain, shortness of breath, nausea/vomiting.        Prior to Admission Medications   Prescriptions Last Dose Informant Patient Reported? Taking?   B-D ULTRAFINE III SHORT PEN 31G X 8 MM MISC  Self, Spouse/Significant Other Yes No   Calcium Citrate 250 MG TABS   No No   Sig: TAKE 2 TABLETS (500MG) 2 TIMES A DAY   Patient taking differently: Take 500 mg by mouth in the morning   Coenzyme Q10 (CoQ10) 100 MG CAPS  Self, Spouse/Significant Other Yes No   Sig: Take 100 mg by mouth in the morning Bed time   Cyanocobalamin (Vitamin B12) 1000 MCG TBCR   Yes No   Sig: Take 1,000 mcg by mouth once a week   Icosapent Ethyl (Vascepa) 0.5 g CAPS   No No   Sig: Take 2 g by mouth 2 (two) times a day   Icosapent Ethyl (Vascepa) 0.5 g CAPS   No No   Sig: Take 0.5 mg by mouth 2 (two) times a day   Icosapent Ethyl 1 g CAPS  Self, Spouse/Significant Other No No   Sig: Take 2 capsules (2 g total) by mouth 2 (two) times a day   Ketotifen Fumarate (ZADITOR) 0.035 % ophthalmic solution   No No   Sig: Administer 1 drop to both eyes 2 (two) times a  day   NON FORMULARY   Yes No   Patient not taking: Reported on 6/3/2024   Probiotic Product (PROBIOTIC BLEND PO)   Yes No   Sig: Take by mouth   Restasis 0.05 % ophthalmic emulsion  Self, Spouse/Significant Other Yes No   Sig: Administer 1 drop to both eyes every 12 (twelve) hours   Vitamin D, Cholecalciferol, 25 MCG (1000 UT) TABS   Yes No   Sig: Take 2,000 Units by mouth in the morning   acetaminophen (TYLENOL) 500 mg tablet   Yes No   Sig: Take 1,000 mg by mouth every 6 (six) hours as needed for mild pain   albuterol (Ventolin HFA) 90 mcg/act inhaler  Self, Spouse/Significant Other No No   Sig: Inhale 2 puffs every 6 (six) hours as needed for wheezing   amLODIPine (NORVASC) 5 mg tablet   No No   Sig: Take 2 tablets (10 mg total) by mouth daily   aspirin (ECOTRIN LOW STRENGTH) 81 mg EC tablet  Self, Spouse/Significant Other No No   Sig: Take 1 tablet (81 mg total) by mouth daily Do not start before October 31, 2023.   escitalopram (LEXAPRO) 10 mg tablet   No No   Sig: Take 1 tablet (10 mg total) by mouth daily   famotidine (PEPCID) 10 mg tablet  Self No No   Sig: Take 1 tablet (10 mg total) by mouth daily Do not start before September 13, 2023.   hydrALAZINE (APRESOLINE) 25 mg tablet   No No   Sig: Take 1 tablet (25 mg total) by mouth 2 (two) times a day   insulin aspart (NovoLOG) 100 units/mL injection  Self, Spouse/Significant Other Yes No   Sig: Inject under the skin 3 (three) times a day before meals 5units, 5units, 10units.   insulin detemir (Levemir FlexTouch) 100 Units/mL injection pen  Self, Spouse/Significant Other Yes No   Sig: Inject 20 Units under the skin every evening   levothyroxine 100 mcg tablet   No No   Sig: TAKE 1 TABLET BY MOUTH EVERY DAY   Patient taking differently: Take 124 mcg by mouth daily   methenamine hippurate (HIPREX) 1 g tablet  Self, Spouse/Significant Other No No   Sig: Take 1 tablet (1 g total) by mouth 2 (two) times a day with meals   metoprolol tartrate (LOPRESSOR) 50 mg tablet  " Self, Spouse/Significant Other No No   Sig: Take 1 tablet (50 mg total) by mouth every 12 (twelve) hours   pantoprazole (PROTONIX) 40 mg tablet   No No   Sig: Take 1 tablet (40 mg total) by mouth daily   polyethylene glycol (GOLYTELY) 4000 mL solution   No No   Sig: Take 4,000 mL by mouth once for 1 dose   pramipexole (MIRAPEX) 0.25 mg tablet   No No   Sig: TAKE 1 TABLET (0.25 MG TOTAL) BY MOUTH DAILY AT BEDTIME   rosuvastatin (CRESTOR) 5 mg tablet  Self, Spouse/Significant Other No No   Sig: Take 1 tablet (5 mg total) by mouth daily   torsemide (DEMADEX) 10 mg tablet   No No   Sig: Take 10 mg. Daily except Monday, Wednesday, Friday 20 mg.      Facility-Administered Medications: None       Past Medical History:   Diagnosis Date    Actinic keratosis     Acute cystitis without hematuria 04/28/2023    Acute cystitis without hematuria 04/28/2023    Acute respiratory failure with hypoxia (HCC)     Acute-on-chronic kidney injury  (HCC)     NIKOS (acute kidney injury) (Formerly [facility]) 05/08/2020    Allergic     Allergic rhinitis     Ambulatory dysfunction 10/22/2020    AMS (altered mental status) 07/14/2020    Anesthesia     pt reports \"had to use double lumen endo tube for hiatal hernia repair/so surgeon could get to where he needed to work\"    Arthritis     Aspiration into airway     Michael esophagus 1993    Lot of stress with children    Basal cell carcinoma 2007    left cheek     BCC (basal cell carcinoma) 05/27/2021    Left Nasal tip    Breast discharge 06/19/2023    Breast pain 06/19/2023    Cancer (HCC)     squamous cell cancer on forhead    Cancer (HCC)     basal cell on nose     Cholelithiasis     Chronic kidney disease 2000, 2018    Stones, kidney disease stage 4    Chronic pain disorder     bilat feet and joint pain on occas    Colon polyp     Confusion 10/30/2023    Constipation     COPD (chronic obstructive pulmonary disease) (Formerly [facility])     COVID-19 08/2021    recovered at home/did receive monoclonal infusion    COVID-19 " virus infection 08/16/2021    Dental bridge present     Depression     Diabetes mellitus (HCC)     Type 2    Diabetic neuropathy (HCC)     Difficulty walking 2017    Disease of thyroid gland     Dyspnea     Elevated liver enzymes 04/04/2023    Epigastric abdominal pain with severe diabetic gastroparesis, status post EGD/Botox injection, 5/14 05/17/2021    Facial abrasion, initial encounter 03/22/2023    Fall 03/22/2023    Family history of thyroid problem     Fatty liver     Fibromyalgia, primary     Fracture of orbital floor, blow-out, right, closed, with routine healing, subsequent encounter 03/22/2023    GERD (gastroesophageal reflux disease)     Heart burn     Hiatal hernia     History of cholecystectomy 10/27/2023    History of colon polyps 07/30/2021    History of kidney stones 09/29/2020    Hx of recurrent kidney stones    History of kidney stones 09/29/2020    Hx of recurrent kidney stones  Formatting of this note might be different from the original. Hx of recurrent kidney stones  Last Assessment & Plan:  Formatting of this note might be different from the original. We will set her up for follow-up in 3 months with a KUB prior.  She knows to call if she has any kidney stone type pain in the meantime.    History of Nissen fundoplication 10/27/2023    History of pneumonia     History of repair of hiatal hernia 05/03/2020    Hyperlipidemia     Hyperplasia, parathyroid (HCC)     Hypertension     Hypertensive urgency 10/27/2023    Hyponatremia 04/26/2023    Hypotension 04/13/2022    Kidney problem     Kidney stone     Left hip pain 10/05/2023    Leukocytosis 07/14/2020    Memory loss Julu2 2020    Motion sickness     Motion sickness     Multiple closed fractures of ribs of right side 03/22/2023    Nasal bones, closed fracture 03/22/2023    Neck pain     Obesity 1978    Obesity (BMI 30.0-34.9)     Other chest pain 09/13/2021    Other fatigue 09/21/2023    Peripheral neuropathy     Pollen allergies     RLS  (restless legs syndrome)     S/P foot surgery 07/20/2022    SCC (squamous cell carcinoma) 05/04/2021    left mid forehead    SCC (squamous cell carcinoma) 2023    chest    Seasonal allergies     Shingles 01 05 23    Sleep apnea     has inspire    Squamous cell skin cancer 2007    left cheek     Stroke (HCC)     Swollen ankles     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, left     great toe fusion    Urinary incontinence     Urinary tract infection 03/28/2022    Wears glasses        Past Surgical History:   Procedure Laterality Date    ABDOMINAL SURGERY  1997,2015,1972    Nissen x2 1972 tubal ligarion    ARTHROSCOPY KNEE Right     BREAST BIOPSY      stereotactic-benign    BREAST BIOPSY      stereo-benign    BREAST CYST EXCISION Bilateral     benign    BREAST EXCISIONAL BIOPSY      unknown date-benign    BREAST EXCISIONAL BIOPSY      unknown date-benign    BREAST EXCISIONAL BIOPSY      unknown date-benign    BREAST EXCISIONAL BIOPSY      unknown age-benign    CARDIAC CATHETERIZATION      CATARACT EXTRACTION Right     CHOLECYSTECTOMY      COLONOSCOPY      EXAMINATION UNDER ANESTHESIA N/A 06/24/2021    Procedure: EXAM UNDER ANESTHESIA (EUA), DISE;  Surgeon: Gregory Maier MD;  [facility];  Service: ENT    HIATAL HERNIA REPAIR      KNEE SURGERY Right     Torn maniscus lap surg    LIPOSUCTION      LYMPH NODE BIOPSY      MOHS SURGERY  05/20/2021    left mid forehead-Mickey    MOHS SURGERY Left 05/27/2021    Left nasal tip- mickey     KY LIGATION/BIOPSY TEMPORAL ARTERY Left 01/05/2023    Procedure: BIOPSY ARTERY TEMPORAL;  Surgeon: Alec Dennis DO;  [facility];  Service: Vascular    KY OPEN IMPLANTATION CRANIAL NERVE BOOM & PULSE GEN N/A 11/10/2021    Procedure: INSERTION UPPER AIRWAY STIMULATOR, INSPIRE IMPLANT;  Surgeon: Gregory Maier MD;  [facility];  Service: ENT    KY UNLISTED PROCEDURE FOOT/TOES Right 07/19/2022    Procedure: 1st metatarsal phalangeal joint fusion;  Surgeon: Dede  CATHLEEN Bonner;  [facility];  Service: Podiatry    REDUCTION MAMMAPLASTY Bilateral 2000    REDUCTION MAMMAPLASTY      SKIN BIOPSY      SKIN CANCER EXCISION  2007    squamous cell carcinoma     SKIN CANCER EXCISION  2007    basal cell carcinoma    SKIN CANCER EXCISION  2023    SCCIS chest    SQUAMOUS CELL CARCINOMA EXCISION      TOE SURGERY Right 08/04/2022    Right Great Toe Fusion    TONSILLECTOMY      TUBAL LIGATION      UPPER GASTROINTESTINAL ENDOSCOPY      US GUIDED BREAST BIOPSY RIGHT COMPLETE Right 07/18/2022       Family History   Problem Relation Age of Onset    Heart disease Mother     Depression Mother     Hypertension Mother     COPD Mother     Hearing loss Mother     Anxiety disorder Mother     Heart disease Father     Lung cancer Father 70        Smoker     Cancer Father         brain    Alcohol abuse Father     Dementia Father     Hypertension Father     Thyroid disease Father     COPD Father     Arthritis Father     Brain cancer Father 74    Hypertension Sister     Diabetes Sister     Heart disease Sister     Thyroid disease Sister     Cancer Sister         Lympoma, lung    Lung cancer Sister 79    Anxiety disorder Sister     Migraines Sister     Hypertension Brother     Diabetes Brother     Cancer Brother         Throat    Dementia Brother     Stroke Brother     Hypertension Brother     Heart disease Brother     Diabetes Brother     Hypertension Brother     No Known Problems Son     No Known Problems Son     Brain cancer Paternal Aunt         unknown age    Diabetes Sister     Hypertension Sister     Diabetes Brother     Breast cancer Neg Hx      I have reviewed and agree with the history as documented.    E-Cigarette/Vaping    E-Cigarette Use Never User      E-Cigarette/Vaping Substances    Nicotine No     THC No     CBD No     Flavoring No     Other No     Unknown No      Social History     Tobacco Use    Smoking status: Former     Current packs/day: 0.00     Average packs/day: 2.0  packs/day for 10.0 years (20.0 ttl pk-yrs)     Types: Cigarettes     Start date: 1966     Quit date: 1976     Years since quittin.4     Passive exposure: Past    Smokeless tobacco: Never    Tobacco comments:     Smoked 2 pack a day   Vaping Use    Vaping status: Never Used   Substance Use Topics    Alcohol use: Yes     Comment: 1 or 2 a year    Drug use: No        Review of Systems   Constitutional:  Negative for chills, diaphoresis and fever.   HENT:  Positive for congestion and sinus pressure.    Eyes:  Negative for visual disturbance.   Respiratory:  Positive for cough. Negative for shortness of breath.    Cardiovascular:  Negative for chest pain and palpitations.   Gastrointestinal:  Negative for abdominal pain, constipation, diarrhea, nausea and vomiting.   Genitourinary:  Positive for difficulty urinating (not urinating as much as usual). Negative for flank pain and frequency.   Musculoskeletal:  Positive for arthralgias (chronic, not worsening).   Neurological:  Positive for numbness. Negative for facial asymmetry, weakness and headaches.       Physical Exam  ED Triage Vitals   Temperature Pulse Respirations Blood Pressure SpO2   24   97.9 °F (36.6 °C) 70 17 (!) 171/83 94 %      Temp Source Heart Rate Source Patient Position - Orthostatic VS BP Location FiO2 (%)   24 --   Oral Monitor Sitting Right arm       Pain Score       --                    Orthostatic Vital Signs  Vitals:    24 0128   BP: (!) 171/83 (!) 178/79   Pulse: 70 62   Patient Position - Orthostatic VS: Sitting Sitting       Physical Exam  Vitals and nursing note reviewed.   Constitutional:       General: She is not in acute distress.     Appearance: She is not ill-appearing.   HENT:      Head: Normocephalic and atraumatic.      Right Ear: External ear normal.      Left Ear: External ear  normal.      Nose: Nose normal. No congestion or rhinorrhea.      Mouth/Throat:      Mouth: Mucous membranes are moist.      Pharynx: Oropharynx is clear.   Eyes:      General: No scleral icterus.     Extraocular Movements: Extraocular movements intact.   Cardiovascular:      Rate and Rhythm: Normal rate and regular rhythm.      Pulses: Normal pulses.      Heart sounds: Normal heart sounds.   Pulmonary:      Effort: Pulmonary effort is normal. No respiratory distress.      Breath sounds: Normal breath sounds. No wheezing, rhonchi or rales.   Abdominal:      Palpations: Abdomen is soft.      Tenderness: There is no abdominal tenderness.   Musculoskeletal:         General: Normal range of motion.      Cervical back: Normal range of motion.   Skin:     General: Skin is warm and dry.   Neurological:      Mental Status: She is alert and oriented to person, place, and time. Mental status is at baseline.      Sensory: Sensory deficit (decreased over right side of upper lip and right maxilla) present.      Motor: No weakness.      Coordination: Coordination normal.      Gait: Gait normal.      Comments: No signs of rash on face or tip of nose, no tenderness to temporal area or TMJ.   Psychiatric:         Mood and Affect: Mood normal.         Behavior: Behavior normal.         ED Medications  Medications   sodium chloride 0.9 % bolus 500 mL (0 mL Intravenous Stopped 6/17/24 0132)       Diagnostic Studies  Results Reviewed       Procedure Component Value Units Date/Time    Magnesium [[id number]]  (Normal) Collected: 06/16/24 2123    Lab Status: Final result Specimen: Blood from Arm, Left Updated: 06/17/24 0301     Magnesium 2.4 mg/dL     TSH, 3rd generation with Free T4 reflex [[id number]]  (Normal) Collected: 06/16/24 2123    Lab Status: Final result Specimen: Blood from Arm, Left Updated: 06/17/24 0247     TSH 3RD GENERATON 2.844 uIU/mL     FLU/RSV/COVID - if FLU/RSV clinically relevant [[id number]]  (Normal) Collected:  06/16/24 2358    Lab Status: Final result Specimen: Nares from Nose Updated: 06/17/24 0042     SARS-CoV-2 Negative     INFLUENZA A PCR Negative     INFLUENZA B PCR Negative     RSV PCR Negative    Narrative:      FOR PEDIATRIC PATIENTS - copy/paste COVID Guidelines URL to browser: [email or web address]    SARS-CoV-2 assay is a Nucleic Acid Amplification assay intended for the  qualitative detection of nucleic acid from SARS-CoV-2 in nasopharyngeal  swabs. Results are for the presumptive identification of SARS-CoV-2 RNA.    Positive results are indicative of infection with SARS-CoV-2, the virus  causing COVID-19, but do not rule out bacterial infection or co-infection  with other viruses. Laboratories within the [place] and its  territories are required to report all positive results to the appropriate  public health authorities. Negative results do not preclude SARS-CoV-2  infection and should not be used as the sole basis for treatment or other  patient management decisions. Negative results must be combined with  clinical observations, patient history, and epidemiological information.  This test has not been FDA cleared or approved.    This test has been authorized by FDA under an Emergency Use Authorization  (EUA). This test is only authorized for the duration of time the  declaration that circumstances exist justifying the authorization of the  emergency use of an in vitro diagnostic tests for detection of SARS-CoV-2  virus and/or diagnosis of COVID-19 infection under section 564(b)(1) of  the Act, 21 U.S.C. 360bbb-3(b)(1), unless the authorization is terminated  or revoked sooner. The test has been validated but independent review by FDA  and CLIA is pending.    Test performed using [employer]: This RT-PCR assay targets N2,  a region unique to SARS-CoV-2. A conserved region in the E-gene was chosen  for pan-Sarbecovirus detection which includes  SARS-CoV-2.    According to CMS-2020-01-R, this platform meets the definition of high-throughput technology.    Urine Microscopic [[id number]]  (Abnormal) Collected: 06/16/24 2358    Lab Status: Final result Specimen: Urine, Clean Catch Updated: 06/17/24 0015     RBC, UA None Seen /hpf      WBC, UA 2-4 /hpf      Epithelial Cells Occasional /hpf      Bacteria, UA Occasional /hpf     UA w Reflex to Microscopic w Reflex to Culture [[id number]]  (Abnormal) Collected: 06/16/24 2358    Lab Status: Final result Specimen: Urine, Clean Catch Updated: 06/17/24 0011     Color, UA Light Yellow     Clarity, UA Clear     Specific Gravity, UA 1.015     pH, UA 6.0     Leukocytes, UA Negative     Nitrite, UA Negative     Protein,  (3+) mg/dl      Glucose, UA Negative mg/dl      Ketones, UA Negative mg/dl      Urobilinogen, UA <2.0 mg/dl      Bilirubin, UA Negative     Occult Blood, UA Negative    Basic metabolic panel [[id number]]  (Abnormal) Collected: 06/16/24 2123    Lab Status: Final result Specimen: Blood from Arm, Left Updated: 06/16/24 2151     Sodium 135 mmol/L      Potassium 3.8 mmol/L      Chloride 103 mmol/L      CO2 22 mmol/L      ANION GAP 10 mmol/L      BUN 74 mg/dL      Creatinine 3.17 mg/dL      Glucose 128 mg/dL      Calcium 8.8 mg/dL      eGFR 13 ml/min/1.73sq m     Narrative:      National Kidney Disease Foundation guidelines for Chronic Kidney Disease (CKD):     Stage 1 with normal or high GFR (GFR > 90 mL/min/1.73 square meters)    Stage 2 Mild CKD (GFR = 60-89 mL/min/1.73 square meters)    Stage 3A Moderate CKD (GFR = 45-59 mL/min/1.73 square meters)    Stage 3B Moderate CKD (GFR = 30-44 mL/min/1.73 square meters)    Stage 4 Severe CKD (GFR = 15-29 mL/min/1.73 square meters)    Stage 5 End Stage CKD (GFR <15 mL/min/1.73 square meters)  Note: GFR calculation is accurate only with a steady state creatinine    CBC and differential [[id number]]  (Abnormal) Collected: 06/16/24 2123    Lab Status: Final result  Specimen: Blood from Arm, Left Updated: 06/16/24 2133     WBC 11.72 Thousand/uL      RBC 3.82 Million/uL      Hemoglobin 11.3 g/dL      Hematocrit 35.3 %      MCV 92 fL      MCH 29.6 pg      MCHC 32.0 g/dL      RDW 13.2 %      MPV 11.7 fL      Platelets 248 Thousands/uL      nRBC 0 /100 WBCs      Segmented % 75 %      Immature Grans % 1 %      Lymphocytes % 16 %      Monocytes % 5 %      Eosinophils Relative 3 %      Basophils Relative 0 %      Absolute Neutrophils 8.79 Thousands/µL      Absolute Immature Grans 0.09 Thousand/uL      Absolute Lymphocytes 1.91 Thousands/µL      Absolute Monocytes 0.59 Thousand/µL      Eosinophils Absolute 0.31 Thousand/µL      Basophils Absolute 0.03 Thousands/µL                    XR chest 1 view portable   ED Interpretation by Darren Brody DO (06/17 0020)   Per my independent interpretation, no acute cardiopulmonary disease        CT facial bones without contrast   ED Interpretation by Darren Brody DO (06/17 0147)   PROCEDURE INFORMATION:  Exam: CT Maxillofacial Without Contrast  Exam date and time: 6/16/2024 11:44 PM  Age: 74 years old  Clinical indication: Other: Sinus pain/pressure  TECHNIQUE:  Imaging protocol: Computed tomography of the face without contrast.  COMPARISON:  CT FACIAL BONES WO CONTRAST 3/22/2023 3:48 PM  FINDINGS:  Orbital cavities: Orbits are normal. Globes are unremarkable.  Paranasal sinuses: Normal. No air-fluid levels.  Bones: Remote blowout type fracture of the right inferior orbital wall. No acute fracture.  Soft tissues: Unremarkable.    IMPRESSION:  No acute findings.  Dictated and Authenticated by: Klisch, Gregory, MD  06/17/2024 1:45 AM Eastern Time ([place] & [place]            Procedures  Procedures      ED Course  ED Course as of 06/17/24 0313   Sun Jun 16, 2024   2334 Creatinine(!): 3.17  NIKOS, given 500 bolus   Mon Jun 17, 2024   0151 CT facial  No acute abnormalities.                                       Medical Decision  "Zoya Mena came in with right-sided facial numbness and sinus pressure for approximately 3 weeks worsened over the last few days.  On exam, she had no focal neurological deficits aside from decreased sensation over the maxillary sinus on the right side.  Patient showed no ataxia and a normal finger-nose and heel-to-shin.  This lower suspicion for CVA.  Patient had no tenderness over the temporal arteries, TMJ, and no rash.  She did have a history of shingles, but was vaccinated post infection.  She is not tender to palpation.  CT facial bones showed no acute abnormalities.  Patient also expressed no fever, headache, vision changes, to lower suspicion for cavernous sinus thrombosis.  I discussed with patient that she will be treated for sinusitis given 7-day course of Augmentin.  She was instructed to follow closely with PCP and given contact information ENT if needed.  Patient understands and agrees with the plan.  Strict return precautions given if symptoms are worsening or not resolving.  Patient discharged in stable condition.      Portions of the record have been created with voice recognition software.  Occasional wrong word or \"sound a like\" substitution may have occurred due to the inherent limitations of voice recognition software.  Read the chart carefully and recognize, using context, where substitutions have occurred.       Amount and/or Complexity of Data Reviewed  Labs: ordered. Decision-making details documented in ED Course.  Radiology: ordered and independent interpretation performed.    Risk  Prescription drug management.          Disposition  Final diagnoses:   Facial numbness   Sinusitis     Time reflects when diagnosis was documented in both MDM as applicable and the Disposition within this note       Time User Action Codes Description Comment    6/17/2024  3:02 AM Darren Brody Add [R20.0] Facial numbness     6/17/2024  3:02 AM Darren Brody Add [J32.9] Sinusitis     6/17/2024  3:02 " AM Darren Brody Modify [R20.0] Facial numbness     6/17/2024  3:02 AM Darren Brody Modify [J32.9] Sinusitis           ED Disposition       ED Disposition   Discharge    Condition   Stable    Date/Time   Mon Jun 17, 2024  3:02 AM    Brandt Davis discharge to home/self care.                   Follow-up Information       Follow up With Specialties Details Why Contact Info Additional Information    Tree Aguilar MD Internal Medicine Call  As needed, For ED follow-up [address] [facility] [address]  [phone]       [facility] Emergency Department Emergency Medicine Go to  If symptoms worsen or do not resolve [facility] [id number] [phone] [facility] Emergency Department, [facility] [place], [place], [address]    [facility] Ear, Nose, & Throat Pod Otolaryngology   [address] [facility] [id number]             Discharge Medication List as of 6/17/2024  3:04 AM        START taking these medications    Details   amoxicillin-clavulanate (AUGMENTIN) 875-125 mg per tablet Take 1 tablet by mouth every 12 (twelve) hours for 7 days, Starting Mon 6/17/2024, Until Mon 6/24/2024, Normal           CONTINUE these medications which have NOT CHANGED    Details   acetaminophen (TYLENOL) 500 mg tablet Take 1,000 mg by mouth every 6 (six) hours as needed for mild pain, Historical Med      albuterol (Ventolin HFA) 90 mcg/act inhaler Inhale 2 puffs every 6 (six) hours as needed for wheezing, Starting Thu 8/4/2022, Normal      amLODIPine (NORVASC) 5 mg tablet Take 2 tablets (10 mg total) by mouth daily, Starting Wed 5/22/2024, Normal      aspirin (ECOTRIN LOW STRENGTH) 81 mg EC tablet Take 1 tablet (81 mg total) by mouth daily Do not start before October 31, 2023., Starting Tue 10/31/2023, No Print      B-D ULTRAFINE III SHORT PEN 31G X 8 MM MISC Historical Med      Calcium Citrate 250 MG TABS TAKE 2  TABLETS (500MG) 2 TIMES A DAY, Normal      Coenzyme Q10 (CoQ10) 100 MG CAPS Take 100 mg by mouth in the morning Bed time, Historical Med      Cyanocobalamin (Vitamin B12) 1000 MCG TBCR Take 1,000 mcg by mouth once a week, Starting Wed 1/10/2024, Historical Med      escitalopram (LEXAPRO) 10 mg tablet Take 1 tablet (10 mg total) by mouth daily, Starting Wed 6/12/2024, Until Tue 9/10/2024, Normal      famotidine (PEPCID) 10 mg tablet Take 1 tablet (10 mg total) by mouth daily Do not start before September 13, 2023., Starting Wed 9/13/2023, Until Mon 6/3/2024, Normal      hydrALAZINE (APRESOLINE) 25 mg tablet Take 1 tablet (25 mg total) by mouth 2 (two) times a day, Starting Tue 4/23/2024, No Print      !! Icosapent Ethyl (Vascepa) 0.5 g CAPS Take 2 g by mouth 2 (two) times a day, Starting Wed 4/17/2024, Normal      !! Icosapent Ethyl (Vascepa) 0.5 g CAPS Take 0.5 mg by mouth 2 (two) times a day, Starting Fri 4/19/2024, Normal      !! Icosapent Ethyl 1 g CAPS Take 2 capsules (2 g total) by mouth 2 (two) times a day, Starting Wed 11/22/2023, Normal      insulin aspart (NovoLOG) 100 units/mL injection Inject under the skin 3 (three) times a day before meals 5units, 5units, 10units., Historical Med      insulin detemir (Levemir FlexTouch) 100 Units/mL injection pen Inject 20 Units under the skin every evening, Starting Tue 6/8/2021, Historical Med      Ketotifen Fumarate (ZADITOR) 0.035 % ophthalmic solution Administer 1 drop to both eyes 2 (two) times a day, Starting Mon 6/3/2024, Normal      levothyroxine 100 mcg tablet TAKE 1 TABLET BY MOUTH EVERY DAY, Starting Thu 5/2/2024, Normal      methenamine hippurate (HIPREX) 1 g tablet Take 1 tablet (1 g total) by mouth 2 (two) times a day with meals, Starting Wed 1/3/2024, Normal      metoprolol tartrate (LOPRESSOR) 50 mg tablet Take 1 tablet (50 mg total) by mouth every 12 (twelve) hours, Starting Tue 6/6/2023, No Print      NON FORMULARY Historical Med      pantoprazole  (PROTONIX) 40 mg tablet Take 1 tablet (40 mg total) by mouth daily, Starting Wed 5/29/2024, Normal      polyethylene glycol (GOLYTELY) 4000 mL solution Take 4,000 mL by mouth once for 1 dose, Starting Wed 5/29/2024, Normal      pramipexole (MIRAPEX) 0.25 mg tablet TAKE 1 TABLET (0.25 MG TOTAL) BY MOUTH DAILY AT BEDTIME, Starting Sat 6/15/2024, Normal      Probiotic Product (PROBIOTIC BLEND PO) Take by mouth, Historical Med      Restasis 0.05 % ophthalmic emulsion Administer 1 drop to both eyes every 12 (twelve) hours, Starting Sun 12/24/2023, Historical Med      rosuvastatin (CRESTOR) 5 mg tablet Take 1 tablet (5 mg total) by mouth daily, Starting Thu 2/22/2024, Normal      torsemide (DEMADEX) 10 mg tablet Take 10 mg. Daily except Monday, Wednesday, Friday 20 mg., Normal      Vitamin D, Cholecalciferol, 25 MCG (1000 UT) TABS Take 2,000 Units by mouth in the morning, Historical Med       !! - Potential duplicate medications found. Please discuss with provider.        No discharge procedures on file.    PDMP Review         Value Time User    PDMP Reviewed  Yes 3/24/2023  9:59 PM Hansel Burr MD             ED Provider  Attending physically available and evaluated Trina DONITA Davis. I managed the patient along with the ED Attending.    Electronically Signed by           Darren Brody DO  06/17/24 9401

## 2024-06-17 NOTE — ED PROCEDURE NOTE
Procedure  US Guided Peripheral IV    Date/Time: 6/17/2024 12:39 AM    Performed by: Ar Weir DO  Authorized by: Ar Weir DO    Patient location:  ED  Performed by:  Resident  Other Assisting Provider: No    Indications:     Indications: difficulty obtaining IV access    Procedure details:     Patient evaluated for contraindications to access (i.e. fistula, thrombosis, etc): Yes      Standard clean technique used for ultrasound access: Yes      Location:  Left arm    Catheter size:  20 gauge    Number of attempts:  1    Successful placement: yes    Post-procedure details:     Post-procedure:  Dressing applied    Assessment: free fluid flow and no signs of infiltration      Post-procedure complications: none      Patient tolerance of procedure:  Tolerated well, no immediate complications                   Ar Weir DO  06/17/24 0040

## 2024-06-18 ENCOUNTER — VBI (OUTPATIENT)
Dept: FAMILY MEDICINE CLINIC | Facility: CLINIC | Age: 74
End: 2024-06-18

## 2024-06-18 DIAGNOSIS — R79.89 ELEVATED LFTS: Primary | ICD-10-CM

## 2024-06-18 NOTE — TELEPHONE ENCOUNTER
06/18/24 9:48 AM    Patient contacted post ED visit, [facility] department spoke with patient/caregiver and outreach was successful.    Thank you.  MARGARITA CHEN  [facility]

## 2024-06-25 DIAGNOSIS — E55.9 VITAMIN D DEFICIENCY: Chronic | ICD-10-CM

## 2024-06-25 DIAGNOSIS — N18.5 ANEMIA IN STAGE 5 CHRONIC KIDNEY DISEASE, NOT ON CHRONIC DIALYSIS  (HCC): ICD-10-CM

## 2024-06-25 DIAGNOSIS — N18.5 CKD (CHRONIC KIDNEY DISEASE) STAGE 5, GFR LESS THAN 15 ML/MIN (HCC): ICD-10-CM

## 2024-06-25 DIAGNOSIS — D63.1 ANEMIA IN STAGE 5 CHRONIC KIDNEY DISEASE, NOT ON CHRONIC DIALYSIS  (HCC): ICD-10-CM

## 2024-06-25 DIAGNOSIS — E11.21 DIABETIC NEPHROPATHY ASSOCIATED WITH TYPE 2 DIABETES MELLITUS (HCC): ICD-10-CM

## 2024-06-25 DIAGNOSIS — I12.0 PARENCHYMAL RENAL HYPERTENSION, STAGE 5 CHRONIC KIDNEY DISEASE OR END STAGE RENAL DISEASE (HCC): ICD-10-CM

## 2024-06-25 DIAGNOSIS — E78.2 MIXED HYPERLIPIDEMIA: ICD-10-CM

## 2024-06-25 DIAGNOSIS — N25.81 SECONDARY HYPERPARATHYROIDISM OF RENAL ORIGIN (HCC): Chronic | ICD-10-CM

## 2024-06-26 ENCOUNTER — TELEPHONE (OUTPATIENT)
Age: 74
End: 2024-06-26

## 2024-06-26 DIAGNOSIS — B37.9 YEAST INFECTION: Primary | ICD-10-CM

## 2024-06-26 RX ORDER — TORSEMIDE 10 MG/1
TABLET ORAL
Qty: 50 TABLET | Refills: 1 | Status: SHIPPED | OUTPATIENT
Start: 2024-06-26

## 2024-06-26 RX ORDER — FLUCONAZOLE 150 MG/1
150 TABLET ORAL ONCE
Qty: 1 TABLET | Refills: 0 | Status: SHIPPED | OUTPATIENT
Start: 2024-06-26 | End: 2024-06-26

## 2024-06-26 NOTE — TELEPHONE ENCOUNTER
"Pt called, reports recently completed antibx for sinus infection two days ago. Pt believes she has a yeast infection, has had previously due to antibx use. Pt states sinus infection cleared, denies further issues with the same. Pt requesting medication \"pill\" be sent to her pharmacy, [facility], for the same. Advised Pt she may need to make an appointment to be seen for the same in the office.     Please advise, Thank you.  "

## 2024-06-27 ENCOUNTER — TELEPHONE (OUTPATIENT)
Dept: NEPHROLOGY | Facility: CLINIC | Age: 74
End: 2024-06-27

## 2024-06-27 NOTE — TELEPHONE ENCOUNTER
Spoke with patient about the following, she expressed understanding and thanked us for the call.  She is asking if she needs to continue taking torsemide alternating between 10 mg and 20 mg. She said her swelling has improved and is just in her feet and that isn't too bad.    ----- Message from Donaldo Baires MD sent at 6/27/2024  2:22 PM EDT -----  Because his morning blood pressures are high evenings are good I would change amlodipine to a full 10 mg in the evening I believe she takes 5 mg twice a day  Then repeat blood pressures in about 4 to 6 weeks  ----- Message -----  From: Chyna Beebe  Sent: 6/27/2024   2:17 PM EDT  To: Donaldo Baires MD    AM:  145/80  PM:  127/72  Pulse:  55

## 2024-06-27 NOTE — TELEPHONE ENCOUNTER
Spoke with patient about the following, she expressed understanding and thanked us for the call:    She can try 10 mg daily of torsemide if she needs a second dose or 20 mg she can do so her swelling increases

## 2024-07-02 DIAGNOSIS — I10 ESSENTIAL HYPERTENSION: ICD-10-CM

## 2024-07-02 RX ORDER — METOPROLOL TARTRATE 50 MG/1
25 TABLET, FILM COATED ORAL EVERY 12 HOURS
Qty: 100 TABLET | Refills: 1 | Status: SHIPPED | OUTPATIENT
Start: 2024-07-02

## 2024-07-03 PROBLEM — Z00.00 WELCOME TO MEDICARE PREVENTIVE VISIT: Status: RESOLVED | Noted: 2024-06-03 | Resolved: 2024-07-03

## 2024-07-05 DIAGNOSIS — F03.A0 MILD DEMENTIA WITHOUT BEHAVIORAL DISTURBANCE, PSYCHOTIC DISTURBANCE, MOOD DISTURBANCE, OR ANXIETY, UNSPECIFIED DEMENTIA TYPE (HCC): Chronic | ICD-10-CM

## 2024-07-06 RX ORDER — ESCITALOPRAM OXALATE 10 MG/1
10 TABLET ORAL DAILY
Qty: 90 TABLET | Refills: 1 | Status: SHIPPED | OUTPATIENT
Start: 2024-07-06

## 2024-07-11 ENCOUNTER — APPOINTMENT (OUTPATIENT)
Dept: LAB | Facility: AMBULARY SURGERY CENTER | Age: 74
End: 2024-07-11
Payer: MEDICARE

## 2024-07-11 DIAGNOSIS — R79.89 ELEVATED LFTS: ICD-10-CM

## 2024-07-11 DIAGNOSIS — N18.5 CKD (CHRONIC KIDNEY DISEASE) STAGE 5, GFR LESS THAN 15 ML/MIN (HCC): ICD-10-CM

## 2024-07-11 LAB
ALBUMIN SERPL BCG-MCNC: 3.5 G/DL (ref 3.5–5)
ALP SERPL-CCNC: 60 U/L (ref 34–104)
ALT SERPL W P-5'-P-CCNC: 41 U/L (ref 7–52)
ANION GAP SERPL CALCULATED.3IONS-SCNC: 10 MMOL/L (ref 4–13)
AST SERPL W P-5'-P-CCNC: 21 U/L (ref 13–39)
BILIRUB DIRECT SERPL-MCNC: 0.05 MG/DL (ref 0–0.2)
BILIRUB SERPL-MCNC: 0.3 MG/DL (ref 0.2–1)
BUN SERPL-MCNC: 52 MG/DL (ref 5–25)
CALCIUM SERPL-MCNC: 9 MG/DL (ref 8.4–10.2)
CHLORIDE SERPL-SCNC: 102 MMOL/L (ref 96–108)
CO2 SERPL-SCNC: 27 MMOL/L (ref 21–32)
CREAT SERPL-MCNC: 2.96 MG/DL (ref 0.6–1.3)
GFR SERPL CREATININE-BSD FRML MDRD: 14 ML/MIN/1.73SQ M
GLUCOSE P FAST SERPL-MCNC: 103 MG/DL (ref 65–99)
POTASSIUM SERPL-SCNC: 3.7 MMOL/L (ref 3.5–5.3)
PROT SERPL-MCNC: 6.1 G/DL (ref 6.4–8.4)
SODIUM SERPL-SCNC: 139 MMOL/L (ref 135–147)

## 2024-07-11 PROCEDURE — 82787 IGG 1 2 3 OR 4 EACH: CPT

## 2024-07-11 PROCEDURE — 80076 HEPATIC FUNCTION PANEL: CPT

## 2024-07-11 PROCEDURE — 82784 ASSAY IGA/IGD/IGG/IGM EACH: CPT

## 2024-07-11 PROCEDURE — 36415 COLL VENOUS BLD VENIPUNCTURE: CPT

## 2024-07-11 PROCEDURE — 80048 BASIC METABOLIC PNL TOTAL CA: CPT

## 2024-07-12 ENCOUNTER — TELEPHONE (OUTPATIENT)
Dept: NEPHROLOGY | Facility: CLINIC | Age: 74
End: 2024-07-12

## 2024-07-12 PROBLEM — Z01.818 PREOPERATIVE CLEARANCE: Status: RESOLVED | Noted: 2019-06-27 | Resolved: 2024-07-12

## 2024-07-12 PROBLEM — E11.21 DIABETIC NEPHROPATHY ASSOCIATED WITH TYPE 2 DIABETES MELLITUS (HCC): Chronic | Status: ACTIVE | Noted: 2020-07-14

## 2024-07-12 PROBLEM — N18.5 HYPERTENSIVE KIDNEY DISEASE WITH CHRONIC KIDNEY DISEASE STAGE V (HCC): Status: ACTIVE | Noted: 2024-07-12

## 2024-07-12 PROBLEM — I12.0 HYPERTENSIVE KIDNEY DISEASE WITH CHRONIC KIDNEY DISEASE STAGE V (HCC): Status: ACTIVE | Noted: 2024-07-12

## 2024-07-12 NOTE — TELEPHONE ENCOUNTER
----- Message from Donaldo Baires MD sent at 7/12/2024  7:21 AM EDT -----  Labs are stable no changes as long she is feeling well  ----- Message -----  From: Lab, Background User  Sent: 7/11/2024   5:26 PM EDT  To: Donaldo Baires MD

## 2024-07-17 ENCOUNTER — OFFICE VISIT (OUTPATIENT)
Age: 74
End: 2024-07-17
Payer: MEDICARE

## 2024-07-17 VITALS
SYSTOLIC BLOOD PRESSURE: 102 MMHG | DIASTOLIC BLOOD PRESSURE: 64 MMHG | BODY MASS INDEX: 26.8 KG/M2 | HEART RATE: 57 BPM | OXYGEN SATURATION: 96 % | HEIGHT: 64 IN | WEIGHT: 157 LBS | TEMPERATURE: 97.5 F

## 2024-07-17 DIAGNOSIS — Z79.4 TYPE 2 DIABETES MELLITUS WITH STAGE 5 CHRONIC KIDNEY DISEASE NOT ON CHRONIC DIALYSIS, WITH LONG-TERM CURRENT USE OF INSULIN (HCC): Chronic | ICD-10-CM

## 2024-07-17 DIAGNOSIS — K21.9 GASTROESOPHAGEAL REFLUX DISEASE WITHOUT ESOPHAGITIS: Chronic | ICD-10-CM

## 2024-07-17 DIAGNOSIS — F01.A0 MILD VASCULAR DEMENTIA WITHOUT BEHAVIORAL DISTURBANCE, PSYCHOTIC DISTURBANCE, MOOD DISTURBANCE, OR ANXIETY (HCC): Primary | ICD-10-CM

## 2024-07-17 DIAGNOSIS — F32.9 REACTIVE DEPRESSION: ICD-10-CM

## 2024-07-17 DIAGNOSIS — E11.22 TYPE 2 DIABETES MELLITUS WITH STAGE 5 CHRONIC KIDNEY DISEASE NOT ON CHRONIC DIALYSIS, WITH LONG-TERM CURRENT USE OF INSULIN (HCC): Chronic | ICD-10-CM

## 2024-07-17 DIAGNOSIS — N18.5 TYPE 2 DIABETES MELLITUS WITH STAGE 5 CHRONIC KIDNEY DISEASE NOT ON CHRONIC DIALYSIS, WITH LONG-TERM CURRENT USE OF INSULIN (HCC): Chronic | ICD-10-CM

## 2024-07-17 DIAGNOSIS — E55.9 VITAMIN D DEFICIENCY: ICD-10-CM

## 2024-07-17 DIAGNOSIS — E03.9 ACQUIRED HYPOTHYROIDISM: Chronic | ICD-10-CM

## 2024-07-17 DIAGNOSIS — Z91.81 AT HIGH RISK FOR FALLS: ICD-10-CM

## 2024-07-17 DIAGNOSIS — K21.9 GASTROESOPHAGEAL REFLUX DISEASE, UNSPECIFIED WHETHER ESOPHAGITIS PRESENT: ICD-10-CM

## 2024-07-17 PROBLEM — R10.13 EPIGASTRIC PAIN: Status: RESOLVED | Noted: 2021-05-17 | Resolved: 2024-07-17

## 2024-07-17 PROBLEM — I12.9 PARENCHYMAL RENAL HYPERTENSION: Status: RESOLVED | Noted: 2023-09-09 | Resolved: 2024-07-17

## 2024-07-17 PROBLEM — D72.829 LEUKOCYTOSIS: Status: RESOLVED | Noted: 2020-07-14 | Resolved: 2024-07-17

## 2024-07-17 PROBLEM — I12.0 HYPERTENSIVE KIDNEY DISEASE WITH CHRONIC KIDNEY DISEASE STAGE V (HCC): Status: RESOLVED | Noted: 2024-07-12 | Resolved: 2024-07-17

## 2024-07-17 PROBLEM — D63.1 ANEMIA IN STAGE 5 CHRONIC KIDNEY DISEASE, NOT ON CHRONIC DIALYSIS  (HCC): Chronic | Status: ACTIVE | Noted: 2024-05-01

## 2024-07-17 LAB
IGG SERPL-MCNC: 448 MG/DL (ref 586–1602)
IGG1 SER-MCNC: 205 MG/DL (ref 248–810)
IGG2 SER-MCNC: 126 MG/DL (ref 130–555)
IGG3 SER-MCNC: 107 MG/DL (ref 15–102)
IGG4 SER-MCNC: 19 MG/DL (ref 2–96)

## 2024-07-17 PROCEDURE — 99483 ASSMT & CARE PLN PT COG IMP: CPT | Performed by: INTERNAL MEDICINE

## 2024-07-17 RX ORDER — PSEUDOEPHEDRINE HCL 30 MG
500 TABLET ORAL DAILY
Start: 2024-07-17

## 2024-07-17 RX ORDER — PANTOPRAZOLE SODIUM 40 MG/1
40 TABLET, DELAYED RELEASE ORAL 2 TIMES DAILY
Start: 2024-07-17

## 2024-07-17 NOTE — PROGRESS NOTES
ASSESSMENT AND PLAN:  1. Mild vascular dementia without behavioral disturbance, psychotic disturbance, mood disturbance, or anxiety (HCC)  Assessment & Plan:  Continues to improve in MoCA testing, now 19/30.  Continue to follow testing over time.    Decision-making capacity: Capacity appears to be improved with increased insight.  Could consider neuropsychology assessment if capacity assessment is needed.  However still needs assistance with medical care and complex finances due to cognitive impairment.    Staging: Mild Stage Dementia (unspecified type)     Medications Review: With signficant polypharmacy, but appears generally appropriate for medical conditions.    Caregiver Review: Low caregiver strain   2. Vitamin D deficiency  -     Calcium Citrate 250 MG TABS; Take 2 tablets (500 mg total) by mouth in the morning  3. Acquired hypothyroidism  Assessment & Plan:  Recent TSH appears to be doing well.  Would have lower threshold to taper dose and consider TSH goal of 3-8.  4. Gastroesophageal reflux disease, unspecified whether esophagitis present  -     pantoprazole (PROTONIX) 40 mg tablet; Take 1 tablet (40 mg total) by mouth 2 (two) times a day  5. Gastroesophageal reflux disease without esophagitis  Assessment & Plan:  Noted.  Continue pantoprazole twice a day and famotidine daily.  6. Type 2 diabetes mellitus with stage 5 chronic kidney disease not on chronic dialysis, with long-term current use of insulin ([facility])  Assessment & Plan:  Continue current torsemide, hydralazine, amlodipine for BP control and insulin for diabetes control.  As well, will continue ASA/statin.  7. Reactive depression  Assessment & Plan:  Continue escitalopram.  8. At high risk for falls        HPI:    We had the pleasure of evaluating Trina Davis who is a 74 y.o. female in Geriatric consultation today.  Ms. Davis is in the office with her .  She lives with  and son    HISTORY AS PER :    COGNITION UPDATE:  Overall  doing well, maybe with some ongoing mild improvements.    FUNCTIONAL STATUS:  BADLs  Does patient require assistance with:  Bathing: No  Dressing: No  Transferring: No  Continence: No  Toileting: No  Feeding: No    IADLs  Dose patient require assistance with:  Telephone: No  Shopping: Yes  Food Preparation: No  Housekeeping: No  Laundry: No  Transportation: Yes  Medications: Yes  Finances: Yes    NEUROPSYCH SYMPTOMS:  Does patient get angry or hostile?  Resist care from others? No  Does patient see or hear things that no one else can see or hear? No  Does patient act impatient and cranky? Does mood frequently change for no apparent reason? No  Does patient act suspicious without good reason (example: believes that others are stealing from him or her, or planning to harm him or her in some way)? No  Does patient less interested in his or her usual activities or in the activities and plans of others? No  Does patient have trouble sleeping at night? No  Dose patient have abnormal movements while asleep? Yes    SAFETY:  Hearing and vision issue: Yes  Any gait or balance disorder: Yes  Uses: walker for longer distances  Any falls in the last year: No  Any history of wandering: No  Are there firearms or guns in the home: No  Does patient drive: No  Any driving accidents or citations in the last year: No  Any concerns about patient's ability to drive: Yes    ACP REVIEW:  Does patient have POA: Yes  Does patient have a Living will: Yes  Any legal assistance needed for healthcare planning?: No    Allergies   Allergen Reactions    Ciprofloxacin Hives    Sulfamethoxazole-Trimethoprim Tongue Swelling       Medications:    Current Outpatient Medications on File Prior to Visit   Medication Sig Dispense Refill    acetaminophen (TYLENOL) 500 mg tablet Take 1,000 mg by mouth every 6 (six) hours as needed for mild pain      albuterol (Ventolin HFA) 90 mcg/act inhaler Inhale 2 puffs every 6 (six) hours as needed for wheezing 54 g 0     amLODIPine (NORVASC) 5 mg tablet Take 2 tablets (10 mg total) by mouth daily 180 tablet 1    aspirin (ECOTRIN LOW STRENGTH) 81 mg EC tablet Take 1 tablet (81 mg total) by mouth daily Do not start before October 31, 2023.  0    B-D ULTRAFINE III SHORT PEN 31G X 8 MM MISC   3    Coenzyme Q10 (CoQ10) 100 MG CAPS Take 100 mg by mouth in the morning Bed time      Cyanocobalamin (Vitamin B12) 1000 MCG TBCR Take 1,000 mcg by mouth once a week      escitalopram (LEXAPRO) 10 mg tablet TAKE 1 TABLET BY MOUTH EVERY DAY 90 tablet 1    famotidine (PEPCID) 10 mg tablet Take 1 tablet (10 mg total) by mouth daily Do not start before September 13, 2023. 30 tablet 0    hydrALAZINE (APRESOLINE) 25 mg tablet Take 1 tablet (25 mg total) by mouth 2 (two) times a day      Icosapent Ethyl (Vascepa) 0.5 g CAPS Take 2 g by mouth 2 (two) times a day 120 capsule 3    insulin aspart (NovoLOG) 100 units/mL injection Inject under the skin 3 (three) times a day before meals 5units, 5units, 10units.      insulin detemir (Levemir FlexTouch) 100 Units/mL injection pen Inject 20 Units under the skin every evening      Ketotifen Fumarate (ZADITOR) 0.035 % ophthalmic solution Administer 1 drop to both eyes 2 (two) times a day 14 mL 0    levothyroxine 100 mcg tablet TAKE 1 TABLET BY MOUTH EVERY DAY 90 tablet 1    methenamine hippurate (HIPREX) 1 g tablet Take 1 tablet (1 g total) by mouth 2 (two) times a day with meals 180 tablet 3    metoprolol tartrate (LOPRESSOR) 50 mg tablet TAKE 0.5 TABLETS (25 MG TOTAL) BY MOUTH EVERY 12 (TWELVE) HOURS 100 tablet 1    polyethylene glycol (GOLYTELY) 4000 mL solution Take 4,000 mL by mouth once for 1 dose 4000 mL 0    pramipexole (MIRAPEX) 0.25 mg tablet TAKE 1 TABLET (0.25 MG TOTAL) BY MOUTH DAILY AT BEDTIME 90 tablet 1    Probiotic Product (PROBIOTIC BLEND PO) Take by mouth      Restasis 0.05 % ophthalmic emulsion Administer 1 drop to both eyes every 12 (twelve) hours      rosuvastatin (CRESTOR) 5 mg tablet Take 1  "tablet (5 mg total) by mouth daily 90 tablet 3    torsemide (DEMADEX) 10 mg tablet Take 10 mg. Daily except Monday, Wednesday, Friday 20 mg. 50 tablet 1    Vitamin D, Cholecalciferol, 25 MCG (1000 UT) TABS Take 2,000 Units by mouth in the morning      [DISCONTINUED] Calcium Citrate 250 MG TABS TAKE 2 TABLETS (500MG) 2 TIMES A DAY (Patient taking differently: Take 500 mg by mouth in the morning) 360 tablet 1    [DISCONTINUED] Icosapent Ethyl (Vascepa) 0.5 g CAPS Take 0.5 mg by mouth 2 (two) times a day 180 capsule 3    [DISCONTINUED] pantoprazole (PROTONIX) 40 mg tablet Take 1 tablet (40 mg total) by mouth daily 180 tablet 1    [DISCONTINUED] NON FORMULARY  (Patient not taking: Reported on 6/3/2024)       No current facility-administered medications on file prior to visit.       History:  Past Medical History:   Diagnosis Date    Actinic keratosis     Acute cystitis without hematuria 04/28/2023    Acute cystitis without hematuria 04/28/2023    Acute respiratory failure with hypoxia (HCC)     Acute-on-chronic kidney injury  (HCC)     NIKOS (acute kidney injury) (HCC) 05/08/2020    Allergic     Allergic rhinitis     Ambulatory dysfunction 10/22/2020    AMS (altered mental status) 07/14/2020    Anesthesia     pt reports \"had to use double lumen endo tube for hiatal hernia repair/so surgeon could get to where he needed to work\"    Arthritis     Aspiration into airway     Michael esophagus 1993    Lot of stress with children    Basal cell carcinoma 2007    left cheek     BCC (basal cell carcinoma) 05/27/2021    Left Nasal tip    Breast discharge 06/19/2023    Breast pain 06/19/2023    Cancer (HCC)     squamous cell cancer on forhead    Cancer (HCC)     basal cell on nose     Cholelithiasis     Chronic kidney disease 2000, 2018    Stones, kidney disease stage 4    Chronic pain disorder     bilat feet and joint pain on occas    Colon polyp     Confusion 10/30/2023    Constipation     COPD (chronic obstructive pulmonary " disease) (HCC)     COVID-19 08/2021    recovered at home/did receive monoclonal infusion    COVID-19 virus infection 08/16/2021    Dental bridge present     Depression     Diabetes mellitus (HCC)     Type 2    Diabetic neuropathy (HCC)     Difficulty walking 2017    Disease of thyroid gland     Dyspnea     Elevated liver enzymes 04/04/2023    Epigastric abdominal pain with severe diabetic gastroparesis, status post EGD/Botox injection, 5/14 05/17/2021    Facial abrasion, initial encounter 03/22/2023    Fall 03/22/2023    Family history of thyroid problem     Fatty liver     Fibromyalgia, primary     Fracture of orbital floor, blow-out, right, closed, with routine healing, subsequent encounter 03/22/2023    GERD (gastroesophageal reflux disease)     Heart burn     Hiatal hernia     History of cholecystectomy 10/27/2023    History of colon polyps 07/30/2021    History of kidney stones 09/29/2020    Hx of recurrent kidney stones    History of kidney stones 09/29/2020    Hx of recurrent kidney stones  Formatting of this note might be different from the original. Hx of recurrent kidney stones  Last Assessment & Plan:  Formatting of this note might be different from the original. We will set her up for follow-up in 3 months with a KUB prior.  She knows to call if she has any kidney stone type pain in the meantime.    History of Nissen fundoplication 10/27/2023    History of pneumonia     History of repair of hiatal hernia 05/03/2020    Hyperlipidemia     Hyperplasia, parathyroid (HCC)     Hypertension     Hypertensive urgency 10/27/2023    Hyponatremia 04/26/2023    Hypotension 04/13/2022    Kidney problem     Kidney stone     Left hip pain 10/05/2023    Leukocytosis 07/14/2020    Memory loss Julu2 2020    Motion sickness     Motion sickness     Multiple closed fractures of ribs of right side 03/22/2023    Nasal bones, closed fracture 03/22/2023    Neck pain     Obesity 1978    Obesity (BMI 30.0-34.9)     Other chest pain  09/13/2021    Other fatigue 09/21/2023    Peripheral neuropathy     Pollen allergies     Preoperative clearance 06/27/2019    RLS (restless legs syndrome)     S/P foot surgery 07/20/2022    SCC (squamous cell carcinoma) 05/04/2021    left mid forehead    SCC (squamous cell carcinoma) 2023    chest    Seasonal allergies     Shingles 01 05 23    Sleep apnea     has inspire    Squamous cell skin cancer 2007    left cheek     Stroke (HCC)     Swollen ankles     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, left     great toe fusion    Urinary incontinence     Urinary tract infection 03/28/2022    Wears glasses      Past Surgical History:   Procedure Laterality Date    ABDOMINAL SURGERY  1997,2015,1972    Nissen x2 1972 tubal ligarion    ARTHROSCOPY KNEE Right     BREAST BIOPSY      stereotactic-benign    BREAST BIOPSY      stereo-benign    BREAST CYST EXCISION Bilateral     benign    BREAST EXCISIONAL BIOPSY      unknown date-benign    BREAST EXCISIONAL BIOPSY      unknown date-benign    BREAST EXCISIONAL BIOPSY      unknown date-benign    BREAST EXCISIONAL BIOPSY      unknown age-benign    CARDIAC CATHETERIZATION      CATARACT EXTRACTION Right     CHOLECYSTECTOMY      COLONOSCOPY      EXAMINATION UNDER ANESTHESIA N/A 06/24/2021    Procedure: EXAM UNDER ANESTHESIA (EUA), DISE;  Surgeon: Gregory Maier MD;  [facility];  Service: ENT    HIATAL HERNIA REPAIR      KNEE SURGERY Right     Torn maniscus lap surg    LIPOSUCTION      LYMPH NODE BIOPSY      MOHS SURGERY  05/20/2021    left mid forehead-Mickey    MOHS SURGERY Left 05/27/2021    Left nasal tip- mickey     NJ LIGATION/BIOPSY TEMPORAL ARTERY Left 01/05/2023    Procedure: BIOPSY ARTERY TEMPORAL;  Surgeon: Alec Dennis DO;  [facility];  Service: Vascular    NJ OPEN IMPLANTATION CRANIAL NERVE BOOM & PULSE GEN N/A 11/10/2021    Procedure: INSERTION UPPER AIRWAY STIMULATOR, INSPIRE IMPLANT;  Surgeon: Gregory Maier MD;  [facility];  Service: ENT     KS UNLISTED PROCEDURE FOOT/TOES Right 07/19/2022    Procedure: 1st metatarsal phalangeal joint fusion;  Surgeon: Dede Bonner DPM;  [facility];  Service: Podiatry    REDUCTION MAMMAPLASTY Bilateral 2000    REDUCTION MAMMAPLASTY      SKIN BIOPSY      SKIN CANCER EXCISION  2007    squamous cell carcinoma     SKIN CANCER EXCISION  2007    basal cell carcinoma    SKIN CANCER EXCISION  2023    SCCIS chest    SQUAMOUS CELL CARCINOMA EXCISION      TOE SURGERY Right 08/04/2022    Right Great Toe Fusion    TONSILLECTOMY      TUBAL LIGATION      UPPER GASTROINTESTINAL ENDOSCOPY      US GUIDED BREAST BIOPSY RIGHT COMPLETE Right 07/18/2022     Family History   Problem Relation Age of Onset    Heart disease Mother     Depression Mother     Hypertension Mother     COPD Mother     Hearing loss Mother     Anxiety disorder Mother     Heart disease Father     Lung cancer Father 70        Smoker     Cancer Father         brain    Alcohol abuse Father     Dementia Father     Hypertension Father     Thyroid disease Father     COPD Father     Arthritis Father     Brain cancer Father 74    Hypertension Sister     Diabetes Sister     Heart disease Sister     Thyroid disease Sister     Cancer Sister         Lympoma, lung    Lung cancer Sister 79    Anxiety disorder Sister     Migraines Sister     Hypertension Brother     Diabetes Brother     Cancer Brother         Throat    Dementia Brother     Stroke Brother     Hypertension Brother     Heart disease Brother     Diabetes Brother     Hypertension Brother     No Known Problems Son     No Known Problems Son     Brain cancer Paternal Aunt         unknown age    Diabetes Sister     Hypertension Sister     Diabetes Brother     Breast cancer Neg Hx      Social History     Socioeconomic History    Marital status: /Civil Union     Spouse name: Not on file    Number of children: Not on file    Years of education: Not on file    Highest education level: Not on file    Occupational History    Occupation: RETIRED   Tobacco Use    Smoking status: Former     Current packs/day: 0.00     Average packs/day: 2.0 packs/day for 10.0 years (20.0 ttl pk-yrs)     Types: Cigarettes     Start date: 1966     Quit date: 1976     Years since quittin.5     Passive exposure: Past    Smokeless tobacco: Never    Tobacco comments:     Smoked 2 pack a day   Vaping Use    Vaping status: Never Used   Substance and Sexual Activity    Alcohol use: Yes     Comment: 1 or 2 a year    Drug use: No    Sexual activity: Not Currently     Partners: Male     Birth control/protection: Abstinence, Post-menopausal, Female Sterilization     Comment: defer   Other Topics Concern    Not on file   Social History Narrative    ** Merged History Encounter **          Social Determinants of Health     Financial Resource Strain: Low Risk  (11/10/2023)    [facility]    Overall Financial Resource Strain (CARDIA)     Difficulty of Paying Living Expenses: Not very hard   Food Insecurity: No Food Insecurity (2024)    Hunger Vital Sign     Worried About Running Out of Food in the Last Year: Never true     Ran Out of Food in the Last Year: Never true   Transportation Needs: No Transportation Needs (2024)    PRAPARE - Transportation     Lack of Transportation (Medical): No     Lack of Transportation (Non-Medical): No   Physical Activity: Inactive (11/10/2023)    Received from [facility]    Exercise Vital Sign     Days of Exercise per Week: 0 days     Minutes of Exercise per Session: 0 min   Stress: Stress Concern Present (11/10/2023)    [facility] from [facility], [facility]    [employer] [place] [employer] - Occupational Stress Questionnaire     Feeling of Stress : To some extent   Social Connections: Moderately Integrated (11/10/2023)    Received from [facility],  [facility]    Social Connection and Isolation Panel [NHANES]     Frequency of Communication with Friends and Family: More than three times a week     Frequency of Social Gatherings with Friends and Family: Twice a week     Attends Hoahaoism Services: More than 4 times per year     Active Member of Clubs or Organizations: No     Attends Club or Organization Meetings: Never     Marital Status:    Intimate Partner Violence: Not At Risk (11/10/2023)    Received from [facility], [facility]    Humiliation, Afraid, Rape, and Kick questionnaire     Fear of Current or Ex-Partner: No     Emotionally Abused: No     Physically Abused: No     Sexually Abused: No   Housing Stability: Low Risk  (5/27/2024)    Housing Stability Vital Sign     Unable to Pay for Housing in the Last Year: No     Number of Times Moved in the Last Year: 0     Homeless in the Last Year: No     Past Surgical History:   Procedure Laterality Date    ABDOMINAL SURGERY  1997,2015,1972    Nissen x2 1972 tubal ligarion    ARTHROSCOPY KNEE Right     BREAST BIOPSY      stereotactic-benign    BREAST BIOPSY      stereo-benign    BREAST CYST EXCISION Bilateral     benign    BREAST EXCISIONAL BIOPSY      unknown date-benign    BREAST EXCISIONAL BIOPSY      unknown date-benign    BREAST EXCISIONAL BIOPSY      unknown date-benign    BREAST EXCISIONAL BIOPSY      unknown age-benign    CARDIAC CATHETERIZATION      CATARACT EXTRACTION Right     CHOLECYSTECTOMY      COLONOSCOPY      EXAMINATION UNDER ANESTHESIA N/A 06/24/2021    Procedure: EXAM UNDER ANESTHESIA (EUA), DISE;  Surgeon: Gregory Maier MD;  [facility];  Service: ENT    HIATAL HERNIA REPAIR      KNEE SURGERY Right     Torn maniscus lap surg    LIPOSUCTION      LYMPH NODE BIOPSY      MOHS SURGERY  05/20/2021    left mid forehead-Morgan    MOHS SURGERY Left 05/27/2021    Left nasal tip- morgan     MD LIGATION/BIOPSY TEMPORAL ARTERY Left  "01/05/2023    Procedure: BIOPSY ARTERY TEMPORAL;  Surgeon: Alec Dennis DO;  [facility];  Service: Vascular    RI OPEN IMPLANTATION CRANIAL NERVE BOOM & PULSE GEN N/A 11/10/2021    Procedure: INSERTION UPPER AIRWAY STIMULATOR, INSPIRE IMPLANT;  Surgeon: Gregory Maier MD;  [facility];  Service: ENT    RI UNLISTED PROCEDURE FOOT/TOES Right 07/19/2022    Procedure: 1st metatarsal phalangeal joint fusion;  Surgeon: Dede Bonner DPM;  [facility];  Service: Podiatry    REDUCTION MAMMAPLASTY Bilateral 2000    REDUCTION MAMMAPLASTY      SKIN BIOPSY      SKIN CANCER EXCISION  2007    squamous cell carcinoma     SKIN CANCER EXCISION  2007    basal cell carcinoma    SKIN CANCER EXCISION  2023    SCCIS chest    SQUAMOUS CELL CARCINOMA EXCISION      TOE SURGERY Right 08/04/2022    Right Great Toe Fusion    TONSILLECTOMY      TUBAL LIGATION      UPPER GASTROINTESTINAL ENDOSCOPY      US GUIDED BREAST BIOPSY RIGHT COMPLETE Right 07/18/2022       OBJECTIVE:  Vitals:    07/17/24 1259   BP: 102/64   BP Location: Left arm   Patient Position: Sitting   Cuff Size: Standard   Pulse: 57   Temp: 97.5 °F (36.4 °C)   TempSrc: Temporal   SpO2: 96%   Weight: 71.2 kg (157 lb)   Height: 5' 4\" (1.626 m)     Body mass index is 26.95 kg/m².  Physical Exam    MoCA: 19/30  1/10/24 MoCA: 15/30  12/11/23 MoCA: 12/30  10/10/23 MoCA: 7/30    Geriatric Depression Screen      Flowsheet Row Most Recent Value   Geriatric Depression Scale (Short Version) Total 8            Labs & Imaging:  Lab Results   Component Value Date    WBC 11.72 (H) 06/16/2024    HGB 11.3 (L) 06/16/2024    HCT 35.3 06/16/2024    MCV 92 06/16/2024     06/16/2024     Lab Results   Component Value Date    SODIUM 139 07/11/2024    K 3.7 07/11/2024     07/11/2024    CO2 27 07/11/2024    AGAP 10 07/11/2024    BUN 52 (H) 07/11/2024    CREATININE 2.96 (H) 07/11/2024    GLUC 128 06/16/2024    GLUF 103 (H) 07/11/2024    CALCIUM 9.0 07/11/2024 " "   AST 21 07/11/2024    ALT 41 07/11/2024    ALKPHOS 60 07/11/2024    TP 6.1 (L) 07/11/2024    TBILI 0.30 07/11/2024    EGFR 14 07/11/2024     Lab Results   Component Value Date    HGBA1C 6.9 (H) 03/05/2024     Lab Results   Component Value Date    CHOLESTEROL 113 01/25/2024    CHOLESTEROL 141 11/22/2023    CHOLESTEROL 121 10/28/2023     Lab Results   Component Value Date    HDL 34 (L) 01/25/2024    HDL 41 (L) 11/22/2023    HDL 43 (L) 10/28/2023     Lab Results   Component Value Date    TRIG 331 (H) 01/25/2024    TRIG 490 (H) 11/22/2023    TRIG 371 (H) 10/28/2023     No results found for: \"NONHDLC\"  Lab Results   Component Value Date    LDLCALC 13 01/25/2024    LDLCALC  11/22/2023      Comment:      Calculated LDL invalid, triglycerides >400 mg/dl    LDLDIRECT 25 04/08/2024    LDLDIRECT 28 11/22/2023     Lab Results   Component Value Date    QIEYBTBL23 2,454 (H) 04/08/2024     Lab Results   Component Value Date    OTV5KJWTKRMB 2.844 06/16/2024    TSH 0.38 (L) 04/17/2024     Lab Results   Component Value Date    LFLV98XPLVKQ 30.1 04/01/2024      Results for orders placed during the hospital encounter of 11/07/23    CT head wo contrast    Narrative  CT BRAIN - WITHOUT CONTRAST    INDICATION:   Neuro deficit, acute, stroke suspected  confusion, elevated bp..    COMPARISON:  11/01/2023    TECHNIQUE:  CT examination of the brain was performed.  Multiplanar 2D reformatted images were created from the source data.    Radiation dose length product (DLP) for this visit:  984 mGy-cm .  This examination, like all CT scans performed in the [facility], was performed utilizing techniques to minimize radiation dose exposure, including the use of iterative  reconstruction and automated exposure control.    IMAGE QUALITY:  Diagnostic.    FINDINGS:    PARENCHYMA: Decreased attenuation is noted in periventricular and subcortical white matter demonstrating an appearance that is statistically most likely to represent " mild microangiopathic change.    No CT signs of acute infarction.  No intracranial mass, mass effect or midline shift.  No acute parenchymal hemorrhage.    VENTRICLES AND EXTRA-AXIAL SPACES:  Normal for the patient's age.    VISUALIZED ORBITS: Normal visualized orbits.    PARANASAL SINUSES: Normal visualized paranasal sinuses.    CALVARIUM AND EXTRACRANIAL SOFT TISSUES:  Normal.    Impression  No acute intracranial abnormality.            Workstation performed: [id number]    No results found for this or any previous visit.    No results found for this or any previous visit.    Results for orders placed during the hospital encounter of 11/07/23    MRI brain wo contrast    Narrative  MRI BRAIN WITHOUT CONTRAST    INDICATION: headaches.    COMPARISON:   CT head without contrast 11/7/2023 and MRI brain 10/30/2023. CTA head and neck 10/27/2023.    TECHNIQUE:  Multiplanar, multisequence imaging of the brain was performed.  Imaging performed on 1.5T MRI    IMAGE QUALITY:  Diagnostic.    FINDINGS:    BRAIN PARENCHYMA:    Previously noted punctate focus of restricted diffusion in the right putamen has resolved. There is no residual edema within the right basal ganglia.    There is no discrete mass, mass effect or midline shift. There is no intracranial hemorrhage.  There is no evidence of acute infarction and diffusion imaging is unremarkable.    Small scattered hyperintensities on T2/FLAIR imaging are noted in the periventricular and subcortical white matter demonstrating an appearance that is statistically most likely to represent moderate microangiopathic change.    VENTRICLES:  Ventricles and extra-axial CSF spaces are prominent commensurate with the degree of volume loss. Symmetric prominent sulci in the anterior superior cerebellar hemispheres, likely a normal anatomic variation. No hydrocephalus.  No acute  intraventricular hemorrhage.    SELLA AND PITUITARY GLAND: The    ORBITS:  Normal.    PARANASAL SINUSES:   Normal.    VASCULATURE:  Evaluation of the major intracranial vasculature demonstrates appropriate flow voids.    CALVARIUM AND SKULL BASE: Unremarkable.      EXTRACRANIAL SOFT TISSUES:  Normal.    Impression  No acute infarction.    Stable moderate cerebral chronic microangiopathic changes when comparing to the October 30, 2023 study.        Resident: DONNELL GOODMAN I, the attending radiologist, have reviewed the images and agree with the final report above.    Workstation performed: [id number]    No results found for this or any previous visit.    Results for orders placed during the hospital encounter of 10/26/23    MRI brain NeuroQuant wo contrast    Narrative  MRI BRAIN WITHOUT CONTRAST, NeuroQuant IMAGING    INDICATION: Mild chronic microvascular ischemic change..    COMPARISON:   None.    TECHNIQUE:  Multiplanar, multisequence imaging of the brain was performed.  Sagittal 3D volumetric imaging processed by [employer] software creating General Morphology and Age Related Atrophy reports.      IMAGE QUALITY:  Diagnostic.    FINDINGS:    BRAIN PARENCHYMA:  There is no discrete mass, mass effect or midline shift.  Brainstem and cerebellum demonstrate normal signal. There is no intracranial hemorrhage.  There is no evidence of acute infarction and diffusion imaging is unremarkable. Mild  chronic microvascular ischemic change.    QUANTITATIVE:    Exam Quality: Adequate for volumetric analysis.    Hippocampus  Hippocampal Occupancy Score (HOC):                   0.6  Percentile for similar age:                              4    Total hippocampal volume (cc):                           5.45  Percentile for similar age:                              39    Entorhinal cortex (cc)                                            4.82  Percentile for similar age:                                   78    Superior Lateral ventricular volume (cc):             67.65  Percentile for similar age:                              99    Inferior lateral ventricular volume (cc):                    3.58  Percentile for similar age:                                98    Quantitative conclusions:  Hippocampal Volume:                       Normal volume  Entorhinal Volume:                            Normal volume  Superior Lateral Ventricle Volume:     Normal Volume  Inferior Lateral Ventricle Volume:       Normal Volume    Concordance between qualitative and quantitative hippocampal volume assessment: Concordant.    Change in brain volumes: No previous volumetric study for comparison    Mean hippocampal volume loss among normal elderly: 0.7% per year, (-0.3 to 1.7;  Edy 2008; also [place] 2010).    VENTRICLES:  Normal for the patient's age.    SELLA AND PITUITARY GLAND:  Normal.    ORBITS:  Normal.    PARANASAL SINUSES:  Normal.    VASCULATURE:  Evaluation of the major intracranial vasculature demonstrates appropriate flow voids.    CALVARIUM AND SKULL BASE:  Normal.    EXTRACRANIAL SOFT TISSUES:  Normal.    Impression  No mass effect, acute intracranial hemorrhage or evidence of recent infarction. Mild chronic microvascular ischemic change.    NeuroQuant analysis was performed: Normal hippocampal volume and enlarged ventricular system: Findings do not support hippocampal degeneration. Possible expansion of ventricular system without medial temporal lobe focused ex-vacuo process.        Workstation performed: [id number]    No results found for this or any previous visit.

## 2024-07-17 NOTE — ASSESSMENT & PLAN NOTE
Continue current torsemide, hydralazine, amlodipine for BP control and insulin for diabetes control.  As well, will continue ASA/statin.

## 2024-07-17 NOTE — ASSESSMENT & PLAN NOTE
Continues to improve in MoCA testing, now 19/30.  Continue to follow testing over time.    Decision-making capacity: Capacity appears to be improved with increased insight.  Could consider neuropsychology assessment if capacity assessment is needed.  However still needs assistance with medical care and complex finances due to cognitive impairment.    Staging: Mild Stage Dementia (unspecified type)     Medications Review: With signficant polypharmacy, but appears generally appropriate for medical conditions.    Caregiver Review: Low caregiver strain

## 2024-07-17 NOTE — ASSESSMENT & PLAN NOTE
Recent TSH appears to be doing well.  Would have lower threshold to taper dose and consider TSH goal of 3-8.

## 2024-07-17 NOTE — PATIENT INSTRUCTIONS
ASSESSMENT AND PLAN:  1. Mild vascular dementia without behavioral disturbance, psychotic disturbance, mood disturbance, or anxiety (HCC)  Assessment & Plan:  Continues to improve in MoCA testing, now 19/30.  Continue to follow testing over time.    Decision-making capacity: Capacity appears to be improved with increased insight.  Could consider neuropsychology assessment if capacity assessment is needed.  However still needs assistance with medical care and complex finances due to cognitive impairment.    Staging: Mild Stage Dementia (unspecified type)     Medications Review: With signficant polypharmacy, but appears generally appropriate for medical conditions.    Caregiver Review: Low caregiver strain   2. Vitamin D deficiency  -     Calcium Citrate 250 MG TABS; Take 2 tablets (500 mg total) by mouth in the morning  3. Acquired hypothyroidism  Assessment & Plan:  Recent TSH appears to be doing well.  Would have lower threshold to taper dose and consider TSH goal of 3-8.  4. Gastroesophageal reflux disease, unspecified whether esophagitis present  -     pantoprazole (PROTONIX) 40 mg tablet; Take 1 tablet (40 mg total) by mouth 2 (two) times a day  5. Gastroesophageal reflux disease without esophagitis  Assessment & Plan:  Noted.  Continue pantoprazole twice a day and famotidine daily.  6. Type 2 diabetes mellitus with stage 5 chronic kidney disease not on chronic dialysis, with long-term current use of insulin ([facility])  Assessment & Plan:  Continue current torsemide, hydralazine, amlodipine for BP control and insulin for diabetes control.  As well, will continue ASA/statin.  7. Reactive depression  Assessment & Plan:  Continue escitalopram.  8. At high risk for falls        HPI:    We had the pleasure of evaluating Trina Davis who is a 74 y.o. female in Geriatric consultation today.  Ms. Davis is in the office with her .  She lives with  and son    HISTORY AS PER :    COGNITION UPDATE:  Overall  doing well, maybe with some ongoing mild improvements.    FUNCTIONAL STATUS:  BADLs  Does patient require assistance with:  Bathing: No  Dressing: No  Transferring: No  Continence: No  Toileting: No  Feeding: No    IADLs  Dose patient require assistance with:  Telephone: No  Shopping: Yes  Food Preparation: No  Housekeeping: No  Laundry: No  Transportation: Yes  Medications: Yes  Finances: Yes    NEUROPSYCH SYMPTOMS:  Does patient get angry or hostile?  Resist care from others? No  Does patient see or hear things that no one else can see or hear? No  Does patient act impatient and cranky? Does mood frequently change for no apparent reason? No  Does patient act suspicious without good reason (example: believes that others are stealing from him or her, or planning to harm him or her in some way)? No  Does patient less interested in his or her usual activities or in the activities and plans of others? No  Does patient have trouble sleeping at night? No  Dose patient have abnormal movements while asleep? Yes    SAFETY:  Hearing and vision issue: Yes  Any gait or balance disorder: Yes  Uses: walker for longer distances  Any falls in the last year: No  Any history of wandering: No  Are there firearms or guns in the home: No  Does patient drive: No  Any driving accidents or citations in the last year: No  Any concerns about patient's ability to drive: Yes    ACP REVIEW:  Does patient have POA: Yes  Does patient have a Living will: Yes  Any legal assistance needed for healthcare planning?: No    Allergies   Allergen Reactions    Ciprofloxacin Hives    Sulfamethoxazole-Trimethoprim Tongue Swelling       Medications:    Current Outpatient Medications on File Prior to Visit   Medication Sig Dispense Refill    acetaminophen (TYLENOL) 500 mg tablet Take 1,000 mg by mouth every 6 (six) hours as needed for mild pain      albuterol (Ventolin HFA) 90 mcg/act inhaler Inhale 2 puffs every 6 (six) hours as needed for wheezing 54 g 0     amLODIPine (NORVASC) 5 mg tablet Take 2 tablets (10 mg total) by mouth daily 180 tablet 1    aspirin (ECOTRIN LOW STRENGTH) 81 mg EC tablet Take 1 tablet (81 mg total) by mouth daily Do not start before October 31, 2023.  0    B-D ULTRAFINE III SHORT PEN 31G X 8 MM MISC   3    Coenzyme Q10 (CoQ10) 100 MG CAPS Take 100 mg by mouth in the morning Bed time      Cyanocobalamin (Vitamin B12) 1000 MCG TBCR Take 1,000 mcg by mouth once a week      escitalopram (LEXAPRO) 10 mg tablet TAKE 1 TABLET BY MOUTH EVERY DAY 90 tablet 1    famotidine (PEPCID) 10 mg tablet Take 1 tablet (10 mg total) by mouth daily Do not start before September 13, 2023. 30 tablet 0    hydrALAZINE (APRESOLINE) 25 mg tablet Take 1 tablet (25 mg total) by mouth 2 (two) times a day      Icosapent Ethyl (Vascepa) 0.5 g CAPS Take 2 g by mouth 2 (two) times a day 120 capsule 3    insulin aspart (NovoLOG) 100 units/mL injection Inject under the skin 3 (three) times a day before meals 5units, 5units, 10units.      insulin detemir (Levemir FlexTouch) 100 Units/mL injection pen Inject 20 Units under the skin every evening      Ketotifen Fumarate (ZADITOR) 0.035 % ophthalmic solution Administer 1 drop to both eyes 2 (two) times a day 14 mL 0    levothyroxine 100 mcg tablet TAKE 1 TABLET BY MOUTH EVERY DAY 90 tablet 1    methenamine hippurate (HIPREX) 1 g tablet Take 1 tablet (1 g total) by mouth 2 (two) times a day with meals 180 tablet 3    metoprolol tartrate (LOPRESSOR) 50 mg tablet TAKE 0.5 TABLETS (25 MG TOTAL) BY MOUTH EVERY 12 (TWELVE) HOURS 100 tablet 1    polyethylene glycol (GOLYTELY) 4000 mL solution Take 4,000 mL by mouth once for 1 dose 4000 mL 0    pramipexole (MIRAPEX) 0.25 mg tablet TAKE 1 TABLET (0.25 MG TOTAL) BY MOUTH DAILY AT BEDTIME 90 tablet 1    Probiotic Product (PROBIOTIC BLEND PO) Take by mouth      Restasis 0.05 % ophthalmic emulsion Administer 1 drop to both eyes every 12 (twelve) hours      rosuvastatin (CRESTOR) 5 mg tablet Take 1  "tablet (5 mg total) by mouth daily 90 tablet 3    torsemide (DEMADEX) 10 mg tablet Take 10 mg. Daily except Monday, Wednesday, Friday 20 mg. 50 tablet 1    Vitamin D, Cholecalciferol, 25 MCG (1000 UT) TABS Take 2,000 Units by mouth in the morning      [DISCONTINUED] Calcium Citrate 250 MG TABS TAKE 2 TABLETS (500MG) 2 TIMES A DAY (Patient taking differently: Take 500 mg by mouth in the morning) 360 tablet 1    [DISCONTINUED] Icosapent Ethyl (Vascepa) 0.5 g CAPS Take 0.5 mg by mouth 2 (two) times a day 180 capsule 3    [DISCONTINUED] pantoprazole (PROTONIX) 40 mg tablet Take 1 tablet (40 mg total) by mouth daily 180 tablet 1    [DISCONTINUED] NON FORMULARY  (Patient not taking: Reported on 6/3/2024)       No current facility-administered medications on file prior to visit.       History:  Past Medical History:   Diagnosis Date    Actinic keratosis     Acute cystitis without hematuria 04/28/2023    Acute cystitis without hematuria 04/28/2023    Acute respiratory failure with hypoxia (HCC)     Acute-on-chronic kidney injury  (HCC)     NIKOS (acute kidney injury) (HCC) 05/08/2020    Allergic     Allergic rhinitis     Ambulatory dysfunction 10/22/2020    AMS (altered mental status) 07/14/2020    Anesthesia     pt reports \"had to use double lumen endo tube for hiatal hernia repair/so surgeon could get to where he needed to work\"    Arthritis     Aspiration into airway     Michael esophagus 1993    Lot of stress with children    Basal cell carcinoma 2007    left cheek     BCC (basal cell carcinoma) 05/27/2021    Left Nasal tip    Breast discharge 06/19/2023    Breast pain 06/19/2023    Cancer (HCC)     squamous cell cancer on forhead    Cancer (HCC)     basal cell on nose     Cholelithiasis     Chronic kidney disease 2000, 2018    Stones, kidney disease stage 4    Chronic pain disorder     bilat feet and joint pain on occas    Colon polyp     Confusion 10/30/2023    Constipation     COPD (chronic obstructive pulmonary " disease) (HCC)     COVID-19 08/2021    recovered at home/did receive monoclonal infusion    COVID-19 virus infection 08/16/2021    Dental bridge present     Depression     Diabetes mellitus (HCC)     Type 2    Diabetic neuropathy (HCC)     Difficulty walking 2017    Disease of thyroid gland     Dyspnea     Elevated liver enzymes 04/04/2023    Epigastric abdominal pain with severe diabetic gastroparesis, status post EGD/Botox injection, 5/14 05/17/2021    Facial abrasion, initial encounter 03/22/2023    Fall 03/22/2023    Family history of thyroid problem     Fatty liver     Fibromyalgia, primary     Fracture of orbital floor, blow-out, right, closed, with routine healing, subsequent encounter 03/22/2023    GERD (gastroesophageal reflux disease)     Heart burn     Hiatal hernia     History of cholecystectomy 10/27/2023    History of colon polyps 07/30/2021    History of kidney stones 09/29/2020    Hx of recurrent kidney stones    History of kidney stones 09/29/2020    Hx of recurrent kidney stones  Formatting of this note might be different from the original. Hx of recurrent kidney stones  Last Assessment & Plan:  Formatting of this note might be different from the original. We will set her up for follow-up in 3 months with a KUB prior.  She knows to call if she has any kidney stone type pain in the meantime.    History of Nissen fundoplication 10/27/2023    History of pneumonia     History of repair of hiatal hernia 05/03/2020    Hyperlipidemia     Hyperplasia, parathyroid (HCC)     Hypertension     Hypertensive urgency 10/27/2023    Hyponatremia 04/26/2023    Hypotension 04/13/2022    Kidney problem     Kidney stone     Left hip pain 10/05/2023    Leukocytosis 07/14/2020    Memory loss Julu2 2020    Motion sickness     Motion sickness     Multiple closed fractures of ribs of right side 03/22/2023    Nasal bones, closed fracture 03/22/2023    Neck pain     Obesity 1978    Obesity (BMI 30.0-34.9)     Other chest pain  09/13/2021    Other fatigue 09/21/2023    Peripheral neuropathy     Pollen allergies     Preoperative clearance 06/27/2019    RLS (restless legs syndrome)     S/P foot surgery 07/20/2022    SCC (squamous cell carcinoma) 05/04/2021    left mid forehead    SCC (squamous cell carcinoma) 2023    chest    Seasonal allergies     Shingles 01 05 23    Sleep apnea     has inspire    Squamous cell skin cancer 2007    left cheek     Stroke (HCC)     Swollen ankles     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, left     great toe fusion    Urinary incontinence     Urinary tract infection 03/28/2022    Wears glasses      Past Surgical History:   Procedure Laterality Date    ABDOMINAL SURGERY  1997,2015,1972    Nissen x2 1972 tubal ligarion    ARTHROSCOPY KNEE Right     BREAST BIOPSY      stereotactic-benign    BREAST BIOPSY      stereo-benign    BREAST CYST EXCISION Bilateral     benign    BREAST EXCISIONAL BIOPSY      unknown date-benign    BREAST EXCISIONAL BIOPSY      unknown date-benign    BREAST EXCISIONAL BIOPSY      unknown date-benign    BREAST EXCISIONAL BIOPSY      unknown age-benign    CARDIAC CATHETERIZATION      CATARACT EXTRACTION Right     CHOLECYSTECTOMY      COLONOSCOPY      EXAMINATION UNDER ANESTHESIA N/A 06/24/2021    Procedure: EXAM UNDER ANESTHESIA (EUA), DISE;  Surgeon: Gregory Maier MD;  [facility];  Service: ENT    HIATAL HERNIA REPAIR      KNEE SURGERY Right     Torn maniscus lap surg    LIPOSUCTION      LYMPH NODE BIOPSY      MOHS SURGERY  05/20/2021    left mid forehead-Mickey    MOHS SURGERY Left 05/27/2021    Left nasal tip- mickey     DC LIGATION/BIOPSY TEMPORAL ARTERY Left 01/05/2023    Procedure: BIOPSY ARTERY TEMPORAL;  Surgeon: Alec Dennis DO;  [facility];  Service: Vascular    DC OPEN IMPLANTATION CRANIAL NERVE BOOM & PULSE GEN N/A 11/10/2021    Procedure: INSERTION UPPER AIRWAY STIMULATOR, INSPIRE IMPLANT;  Surgeon: Gregory Maier MD;  [facility];  Service: ENT     MO UNLISTED PROCEDURE FOOT/TOES Right 07/19/2022    Procedure: 1st metatarsal phalangeal joint fusion;  Surgeon: Dede Bonner DPM;  [facility];  Service: Podiatry    REDUCTION MAMMAPLASTY Bilateral 2000    REDUCTION MAMMAPLASTY      SKIN BIOPSY      SKIN CANCER EXCISION  2007    squamous cell carcinoma     SKIN CANCER EXCISION  2007    basal cell carcinoma    SKIN CANCER EXCISION  2023    SCCIS chest    SQUAMOUS CELL CARCINOMA EXCISION      TOE SURGERY Right 08/04/2022    Right Great Toe Fusion    TONSILLECTOMY      TUBAL LIGATION      UPPER GASTROINTESTINAL ENDOSCOPY      US GUIDED BREAST BIOPSY RIGHT COMPLETE Right 07/18/2022     Family History   Problem Relation Age of Onset    Heart disease Mother     Depression Mother     Hypertension Mother     COPD Mother     Hearing loss Mother     Anxiety disorder Mother     Heart disease Father     Lung cancer Father 70        Smoker     Cancer Father         brain    Alcohol abuse Father     Dementia Father     Hypertension Father     Thyroid disease Father     COPD Father     Arthritis Father     Brain cancer Father 74    Hypertension Sister     Diabetes Sister     Heart disease Sister     Thyroid disease Sister     Cancer Sister         Lympoma, lung    Lung cancer Sister 79    Anxiety disorder Sister     Migraines Sister     Hypertension Brother     Diabetes Brother     Cancer Brother         Throat    Dementia Brother     Stroke Brother     Hypertension Brother     Heart disease Brother     Diabetes Brother     Hypertension Brother     No Known Problems Son     No Known Problems Son     Brain cancer Paternal Aunt         unknown age    Diabetes Sister     Hypertension Sister     Diabetes Brother     Breast cancer Neg Hx      Social History     Socioeconomic History    Marital status: /Civil Union     Spouse name: Not on file    Number of children: Not on file    Years of education: Not on file    Highest education level: Not on file    Occupational History    Occupation: RETIRED   Tobacco Use    Smoking status: Former     Current packs/day: 0.00     Average packs/day: 2.0 packs/day for 10.0 years (20.0 ttl pk-yrs)     Types: Cigarettes     Start date: 1966     Quit date: 1976     Years since quittin.5     Passive exposure: Past    Smokeless tobacco: Never    Tobacco comments:     Smoked 2 pack a day   Vaping Use    Vaping status: Never Used   Substance and Sexual Activity    Alcohol use: Yes     Comment: 1 or 2 a year    Drug use: No    Sexual activity: Not Currently     Partners: Male     Birth control/protection: Abstinence, Post-menopausal, Female Sterilization     Comment: defer   Other Topics Concern    Not on file   Social History Narrative    ** Merged History Encounter **          Social Determinants of Health     Financial Resource Strain: Low Risk  (11/10/2023)    [facility]    Overall Financial Resource Strain (CARDIA)     Difficulty of Paying Living Expenses: Not very hard   Food Insecurity: No Food Insecurity (2024)    Hunger Vital Sign     Worried About Running Out of Food in the Last Year: Never true     Ran Out of Food in the Last Year: Never true   Transportation Needs: No Transportation Needs (2024)    PRAPARE - Transportation     Lack of Transportation (Medical): No     Lack of Transportation (Non-Medical): No   Physical Activity: Inactive (11/10/2023)    Received from [facility]    Exercise Vital Sign     Days of Exercise per Week: 0 days     Minutes of Exercise per Session: 0 min   Stress: Stress Concern Present (11/10/2023)    [facility]    [employer] [place] [employer] - Occupational Stress Questionnaire     Feeling of Stress : To some extent   Social Connections: Moderately Integrated (11/10/2023)    Received from [facility],  [facility]    Social Connection and Isolation Panel [NHANES]     Frequency of Communication with Friends and Family: More than three times a week     Frequency of Social Gatherings with Friends and Family: Twice a week     Attends Yazidism Services: More than 4 times per year     Active Member of Clubs or Organizations: No     Attends Club or Organization Meetings: Never     Marital Status:    Intimate Partner Violence: Not At Risk (11/10/2023)    Received from [facility], [facility]    Humiliation, Afraid, Rape, and Kick questionnaire     Fear of Current or Ex-Partner: No     Emotionally Abused: No     Physically Abused: No     Sexually Abused: No   Housing Stability: Low Risk  (5/27/2024)    Housing Stability Vital Sign     Unable to Pay for Housing in the Last Year: No     Number of Times Moved in the Last Year: 0     Homeless in the Last Year: No     Past Surgical History:   Procedure Laterality Date    ABDOMINAL SURGERY  1997,2015,1972    Nissen x2 1972 tubal ligarion    ARTHROSCOPY KNEE Right     BREAST BIOPSY      stereotactic-benign    BREAST BIOPSY      stereo-benign    BREAST CYST EXCISION Bilateral     benign    BREAST EXCISIONAL BIOPSY      unknown date-benign    BREAST EXCISIONAL BIOPSY      unknown date-benign    BREAST EXCISIONAL BIOPSY      unknown date-benign    BREAST EXCISIONAL BIOPSY      unknown age-benign    CARDIAC CATHETERIZATION      CATARACT EXTRACTION Right     CHOLECYSTECTOMY      COLONOSCOPY      EXAMINATION UNDER ANESTHESIA N/A 06/24/2021    Procedure: EXAM UNDER ANESTHESIA (EUA), DISE;  Surgeon: Gregory Maier MD;  [facility];  Service: ENT    HIATAL HERNIA REPAIR      KNEE SURGERY Right     Torn maniscus lap surg    LIPOSUCTION      LYMPH NODE BIOPSY      MOHS SURGERY  05/20/2021    left mid forehead-Morgan    MOHS SURGERY Left 05/27/2021    Left nasal tip- morgan     MN LIGATION/BIOPSY TEMPORAL ARTERY Left  "01/05/2023    Procedure: BIOPSY ARTERY TEMPORAL;  Surgeon: Alec Dennis DO;  [facility];  Service: Vascular    IA OPEN IMPLANTATION CRANIAL NERVE BOOM & PULSE GEN N/A 11/10/2021    Procedure: INSERTION UPPER AIRWAY STIMULATOR, INSPIRE IMPLANT;  Surgeon: Gregory Maier MD;  [facility];  Service: ENT    IA UNLISTED PROCEDURE FOOT/TOES Right 07/19/2022    Procedure: 1st metatarsal phalangeal joint fusion;  Surgeon: Dede Bonner DPM;  [facility];  Service: Podiatry    REDUCTION MAMMAPLASTY Bilateral 2000    REDUCTION MAMMAPLASTY      SKIN BIOPSY      SKIN CANCER EXCISION  2007    squamous cell carcinoma     SKIN CANCER EXCISION  2007    basal cell carcinoma    SKIN CANCER EXCISION  2023    SCCIS chest    SQUAMOUS CELL CARCINOMA EXCISION      TOE SURGERY Right 08/04/2022    Right Great Toe Fusion    TONSILLECTOMY      TUBAL LIGATION      UPPER GASTROINTESTINAL ENDOSCOPY      US GUIDED BREAST BIOPSY RIGHT COMPLETE Right 07/18/2022       OBJECTIVE:  Vitals:    07/17/24 1259   BP: 102/64   BP Location: Left arm   Patient Position: Sitting   Cuff Size: Standard   Pulse: 57   Temp: 97.5 °F (36.4 °C)   TempSrc: Temporal   SpO2: 96%   Weight: 71.2 kg (157 lb)   Height: 5' 4\" (1.626 m)     Body mass index is 26.95 kg/m².  Physical Exam    MoCA: 19/30  1/10/24 MoCA: 15/30  12/11/23 MoCA: 12/30  10/10/23 MoCA: 7/30    Geriatric Depression Screen      Flowsheet Row Most Recent Value   Geriatric Depression Scale (Short Version) Total 8            Labs & Imaging:  Lab Results   Component Value Date    WBC 11.72 (H) 06/16/2024    HGB 11.3 (L) 06/16/2024    HCT 35.3 06/16/2024    MCV 92 06/16/2024     06/16/2024     Lab Results   Component Value Date    SODIUM 139 07/11/2024    K 3.7 07/11/2024     07/11/2024    CO2 27 07/11/2024    AGAP 10 07/11/2024    BUN 52 (H) 07/11/2024    CREATININE 2.96 (H) 07/11/2024    GLUC 128 06/16/2024    GLUF 103 (H) 07/11/2024    CALCIUM 9.0 07/11/2024 " "   AST 21 07/11/2024    ALT 41 07/11/2024    ALKPHOS 60 07/11/2024    TP 6.1 (L) 07/11/2024    TBILI 0.30 07/11/2024    EGFR 14 07/11/2024     Lab Results   Component Value Date    HGBA1C 6.9 (H) 03/05/2024     Lab Results   Component Value Date    CHOLESTEROL 113 01/25/2024    CHOLESTEROL 141 11/22/2023    CHOLESTEROL 121 10/28/2023     Lab Results   Component Value Date    HDL 34 (L) 01/25/2024    HDL 41 (L) 11/22/2023    HDL 43 (L) 10/28/2023     Lab Results   Component Value Date    TRIG 331 (H) 01/25/2024    TRIG 490 (H) 11/22/2023    TRIG 371 (H) 10/28/2023     No results found for: \"NONHDLC\"  Lab Results   Component Value Date    LDLCALC 13 01/25/2024    LDLCALC  11/22/2023      Comment:      Calculated LDL invalid, triglycerides >400 mg/dl    LDLDIRECT 25 04/08/2024    LDLDIRECT 28 11/22/2023     Lab Results   Component Value Date    DGUQXFFX55 2,454 (H) 04/08/2024     Lab Results   Component Value Date    CFN0OCURGVUI 2.844 06/16/2024    TSH 0.38 (L) 04/17/2024     Lab Results   Component Value Date    [id number] 30.1 04/01/2024      Results for orders placed during the hospital encounter of 11/07/23    CT head wo contrast    Narrative  CT BRAIN - WITHOUT CONTRAST    INDICATION:   Neuro deficit, acute, stroke suspected  confusion, elevated bp..    COMPARISON:  11/01/2023    TECHNIQUE:  CT examination of the brain was performed.  Multiplanar 2D reformatted images were created from the source data.    Radiation dose length product (DLP) for this visit:  984 mGy-cm .  This examination, like all CT scans performed in the [facility], was performed utilizing techniques to minimize radiation dose exposure, including the use of iterative  reconstruction and automated exposure control.    IMAGE QUALITY:  Diagnostic.    FINDINGS:    PARENCHYMA: Decreased attenuation is noted in periventricular and subcortical white matter demonstrating an appearance that is statistically most likely to represent " mild microangiopathic change.    No CT signs of acute infarction.  No intracranial mass, mass effect or midline shift.  No acute parenchymal hemorrhage.    VENTRICLES AND EXTRA-AXIAL SPACES:  Normal for the patient's age.    VISUALIZED ORBITS: Normal visualized orbits.    PARANASAL SINUSES: Normal visualized paranasal sinuses.    CALVARIUM AND EXTRACRANIAL SOFT TISSUES:  Normal.    Impression  No acute intracranial abnormality.            Workstation performed: [id number]    No results found for this or any previous visit.    No results found for this or any previous visit.    Results for orders placed during the hospital encounter of 11/07/23    MRI brain wo contrast    Narrative  MRI BRAIN WITHOUT CONTRAST    INDICATION: headaches.    COMPARISON:   CT head without contrast 11/7/2023 and MRI brain 10/30/2023. CTA head and neck 10/27/2023.    TECHNIQUE:  Multiplanar, multisequence imaging of the brain was performed.  Imaging performed on 1.5T MRI    IMAGE QUALITY:  Diagnostic.    FINDINGS:    BRAIN PARENCHYMA:    Previously noted punctate focus of restricted diffusion in the right putamen has resolved. There is no residual edema within the right basal ganglia.    There is no discrete mass, mass effect or midline shift. There is no intracranial hemorrhage.  There is no evidence of acute infarction and diffusion imaging is unremarkable.    Small scattered hyperintensities on T2/FLAIR imaging are noted in the periventricular and subcortical white matter demonstrating an appearance that is statistically most likely to represent moderate microangiopathic change.    VENTRICLES:  Ventricles and extra-axial CSF spaces are prominent commensurate with the degree of volume loss. Symmetric prominent sulci in the anterior superior cerebellar hemispheres, likely a normal anatomic variation. No hydrocephalus.  No acute  intraventricular hemorrhage.    SELLA AND PITUITARY GLAND: The    ORBITS:  Normal.    PARANASAL SINUSES:   Normal.    VASCULATURE:  Evaluation of the major intracranial vasculature demonstrates appropriate flow voids.    CALVARIUM AND SKULL BASE: Unremarkable.      EXTRACRANIAL SOFT TISSUES:  Normal.    Impression  No acute infarction.    Stable moderate cerebral chronic microangiopathic changes when comparing to the October 30, 2023 study.        Resident: DONNELL GOODMAN I, the attending radiologist, have reviewed the images and agree with the final report above.    Workstation performed: [id number]    No results found for this or any previous visit.    Results for orders placed during the hospital encounter of 10/26/23    MRI brain NeuroQuant wo contrast    Narrative  MRI BRAIN WITHOUT CONTRAST, NeuroQuant IMAGING    INDICATION: Mild chronic microvascular ischemic change..    COMPARISON:   None.    TECHNIQUE:  Multiplanar, multisequence imaging of the brain was performed.  Sagittal 3D volumetric imaging processed by MEPS Real-Time software creating General Morphology and Age Related Atrophy reports.      IMAGE QUALITY:  Diagnostic.    FINDINGS:    BRAIN PARENCHYMA:  There is no discrete mass, mass effect or midline shift.  Brainstem and cerebellum demonstrate normal signal. There is no intracranial hemorrhage.  There is no evidence of acute infarction and diffusion imaging is unremarkable. Mild  chronic microvascular ischemic change.    QUANTITATIVE:    Exam Quality: Adequate for volumetric analysis.    Hippocampus  Hippocampal Occupancy Score (HOC):                   0.6  Percentile for similar age:                              4    Total hippocampal volume (cc):                           5.45  Percentile for similar age:                              39    Entorhinal cortex (cc)                                            4.82  Percentile for similar age:                                   78    Superior Lateral ventricular volume (cc):             67.65  Percentile for similar age:                              99    Inferior lateral ventricular volume (cc):                    3.58  Percentile for similar age:                                98    Quantitative conclusions:  Hippocampal Volume:                       Normal volume  Entorhinal Volume:                            Normal volume  Superior Lateral Ventricle Volume:     Normal Volume  Inferior Lateral Ventricle Volume:       Normal Volume    Concordance between qualitative and quantitative hippocampal volume assessment: Concordant.    Change in brain volumes: No previous volumetric study for comparison    Mean hippocampal volume loss among normal elderly: 0.7% per year, (-0.3 to 1.7;  Edy 2008; also Lincoln 2010).    VENTRICLES:  Normal for the patient's age.    SELLA AND PITUITARY GLAND:  Normal.    ORBITS:  Normal.    PARANASAL SINUSES:  Normal.    VASCULATURE:  Evaluation of the major intracranial vasculature demonstrates appropriate flow voids.    CALVARIUM AND SKULL BASE:  Normal.    EXTRACRANIAL SOFT TISSUES:  Normal.    Impression  No mass effect, acute intracranial hemorrhage or evidence of recent infarction. Mild chronic microvascular ischemic change.    NeuroQuant analysis was performed: Normal hippocampal volume and enlarged ventricular system: Findings do not support hippocampal degeneration. Possible expansion of ventricular system without medial temporal lobe focused ex-vacuo process.        Workstation performed: [id number]    No results found for this or any previous visit.

## 2024-07-18 DIAGNOSIS — N25.81 SECONDARY HYPERPARATHYROIDISM OF RENAL ORIGIN (HCC): Chronic | ICD-10-CM

## 2024-07-18 DIAGNOSIS — D63.1 ANEMIA IN STAGE 5 CHRONIC KIDNEY DISEASE, NOT ON CHRONIC DIALYSIS  (HCC): ICD-10-CM

## 2024-07-18 DIAGNOSIS — E55.9 VITAMIN D DEFICIENCY: Chronic | ICD-10-CM

## 2024-07-18 DIAGNOSIS — E11.21 DIABETIC NEPHROPATHY ASSOCIATED WITH TYPE 2 DIABETES MELLITUS (HCC): ICD-10-CM

## 2024-07-18 DIAGNOSIS — E78.2 MIXED HYPERLIPIDEMIA: ICD-10-CM

## 2024-07-18 DIAGNOSIS — I12.0 PARENCHYMAL RENAL HYPERTENSION, STAGE 5 CHRONIC KIDNEY DISEASE OR END STAGE RENAL DISEASE (HCC): ICD-10-CM

## 2024-07-18 DIAGNOSIS — N18.5 ANEMIA IN STAGE 5 CHRONIC KIDNEY DISEASE, NOT ON CHRONIC DIALYSIS  (HCC): ICD-10-CM

## 2024-07-18 DIAGNOSIS — N18.5 CKD (CHRONIC KIDNEY DISEASE) STAGE 5, GFR LESS THAN 15 ML/MIN (HCC): ICD-10-CM

## 2024-07-18 RX ORDER — TORSEMIDE 10 MG/1
TABLET ORAL
Qty: 150 TABLET | Refills: 1 | Status: SHIPPED | OUTPATIENT
Start: 2024-07-18

## 2024-07-29 ENCOUNTER — OFFICE VISIT (OUTPATIENT)
Dept: INTERNAL MEDICINE CLINIC | Facility: CLINIC | Age: 74
End: 2024-07-29
Payer: MEDICARE

## 2024-07-29 VITALS
TEMPERATURE: 97.4 F | HEIGHT: 64 IN | RESPIRATION RATE: 16 BRPM | HEART RATE: 52 BPM | WEIGHT: 158 LBS | SYSTOLIC BLOOD PRESSURE: 134 MMHG | BODY MASS INDEX: 26.98 KG/M2 | OXYGEN SATURATION: 98 % | DIASTOLIC BLOOD PRESSURE: 74 MMHG

## 2024-07-29 DIAGNOSIS — M70.21 OLECRANON BURSITIS OF RIGHT ELBOW: Primary | ICD-10-CM

## 2024-07-29 PROBLEM — N18.4 TYPE 2 DIABETES MELLITUS WITH STAGE 4 CHRONIC KIDNEY DISEASE, WITH LONG-TERM CURRENT USE OF INSULIN (HCC): Status: ACTIVE | Noted: 2020-07-14

## 2024-07-29 PROBLEM — E04.1 THYROID NODULE: Status: ACTIVE | Noted: 2021-03-02

## 2024-07-29 PROCEDURE — G2211 COMPLEX E/M VISIT ADD ON: HCPCS | Performed by: INTERNAL MEDICINE

## 2024-07-29 PROCEDURE — 99213 OFFICE O/P EST LOW 20 MIN: CPT | Performed by: INTERNAL MEDICINE

## 2024-07-29 RX ORDER — NEOMYCIN SULFATE, POLYMYXIN B SULFATE, AND DEXAMETHASONE 3.5; 10000; 1 MG/G; [USP'U]/G; MG/G
0.5 OINTMENT OPHTHALMIC
COMMUNITY
Start: 2024-07-05

## 2024-07-29 NOTE — PROGRESS NOTES
INTERNAL MEDICINE FOLLOW-UP OFFICE VISIT  [facility] - [facility] INTERNAL MEDICINE [place]    NAME: Trina Davis  AGE: 74 y.o. SEX: female  : 1950     DATE: 2024     Assessment and Plan:     Problem List Items Addressed This Visit        Musculoskeletal and Integument    Olecranon bursitis of right elbow - Primary     Following mechanical trauma with fall  Good ROM at the elbow joint with flexion and extension.  +Mild warmth at the area of concern, without overlying erythema or fluctuance and resolving ecchymosis wo significant significant soft tissue swelling.  +effusion.  Recommend trial of conservative therapy with arm warp or compression sleeve.  Apply cool compress for 20 minutes 2-3 times a day.  Can also take tylenol 650 mg q8 hours.    She has an appt with orthopedics this Wednesday and can discuss drainage of the effusion in the office if still present/indicated.  Does not appear to exhibits signs of septic bursitis at this point though reviewed to monitor for signs/symptoms of this with patient and her .            No follow-ups on file.     Chief Complaint:     Chief Complaint   Patient presents with   • Elbow Injury     Fell last week in the house hurt elbow, swelling and warm to touch for several days now        History of Present Illness:     Mechanical fall 1 week ago.  Hit right elbow on wall.  Progressive swelling and pain in R elbow, warm for few days.  She does have good ROM at the elbow joint with flexion and extension and can bear some weight.  No fevers or chills.  Has not trial topical heat/cool compress. No topical or systemic inflammatory use.      The following portions of the patient's history were reviewed and updated as appropriate: allergies, current medications, past family history, past medical history, past social history, past surgical history and problem list.     Review of Systems:     Review of Systems   Constitutional:  Negative for chills and  "fever.   Musculoskeletal:  Positive for joint swelling.   Skin:  Positive for color change.        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   • Fracture of orbital floor, right side, initial encounter for closed fracture ([facility])   • Seasonal allergies   • Osteoporosis   • Palpitations   • Varicose veins of both lower extremities with pain   • Postural dizziness with presyncope   • Mild vascular dementia without behavioral disturbance, psychotic disturbance, mood disturbance, or anxiety ([facility])   • Secondary hyperparathyroidism of renal origin ([facility])   • B12 deficiency   • History of cerebrovascular accident (CVA) due to ischemia   • Mixed stress and urge urinary incontinence   • Slow transit constipation   • Anemia in stage 5 chronic kidney disease, not on chronic dialysis  ([facility])   • Type 2 diabetes mellitus with stage 4 chronic kidney disease, with long-term current use of insulin ([facility])   • Allergies   • Transaminitis   • Reactive depression   • At high risk for falls   • Olecranon bursitis of right elbow        Objective:     /74 (BP Location: Left arm, Patient Position: Sitting, Cuff Size: Adult)   Pulse (!) 52   Temp (!) 97.4 °F (36.3 °C) (Tympanic)   Resp 16   Ht 5' 4\" (1.626 m)   Wt 71.7 kg (158 lb)   SpO2 98%   BMI 27.12 kg/m²     Physical Exam  Vitals reviewed.   Constitutional:       General: She is not in acute distress.     Appearance: She is not toxic-appearing.   Cardiovascular:      Rate and Rhythm: Regular rhythm. Bradycardia present.   Pulmonary:      Effort: No respiratory distress.      Breath " sounds: No wheezing, rhonchi or rales.   Abdominal:      General: There is no distension.      Palpations: Abdomen is soft.      Tenderness: There is no abdominal tenderness.   Musculoskeletal:         General: Swelling, tenderness and signs of injury (right elbow) present.      Comments: + effusion RIGHT olecranon bursa.  No significant erythema, drainage, or fluctuance.  +ecchymoses.  Minimal pain with passive and active flexion and extension at the elbow joint.   Neurological:      Mental Status: She is alert.           Tree Aguilar MD  [facility] INTERNAL MEDICINE [place]

## 2024-07-29 NOTE — ASSESSMENT & PLAN NOTE
Following mechanical trauma with fall  Good ROM at the elbow joint with flexion and extension.  +Mild warmth at the area of concern, without overlying erythema or fluctuance and resolving ecchymosis wo significant significant soft tissue swelling.  +effusion.  Recommend trial of conservative therapy with arm warp or compression sleeve.  Apply cool compress for 20 minutes 2-3 times a day.  Can also take tylenol 650 mg q8 hours.    She has an appt with orthopedics this Wednesday and can discuss drainage of the effusion in the office if still present/indicated.  Does not appear to exhibits signs of septic bursitis at this point though reviewed to monitor for signs/symptoms of this with patient and her .

## 2024-07-30 DIAGNOSIS — I10 ESSENTIAL HYPERTENSION: ICD-10-CM

## 2024-07-30 DIAGNOSIS — I12.9 PARENCHYMAL RENAL HYPERTENSION, STAGE 1 THROUGH STAGE 4 OR UNSPECIFIED CHRONIC KIDNEY DISEASE: ICD-10-CM

## 2024-07-30 DIAGNOSIS — N25.81 SECONDARY HYPERPARATHYROIDISM OF RENAL ORIGIN (HCC): ICD-10-CM

## 2024-07-31 RX ORDER — HYDRALAZINE HYDROCHLORIDE 25 MG/1
25 TABLET, FILM COATED ORAL 2 TIMES DAILY
Qty: 180 TABLET | Refills: 0 | Status: SHIPPED | OUTPATIENT
Start: 2024-07-31 | End: 2024-10-29

## 2024-08-09 ENCOUNTER — APPOINTMENT (OUTPATIENT)
Dept: LAB | Facility: AMBULARY SURGERY CENTER | Age: 74
End: 2024-08-09
Payer: MEDICARE

## 2024-08-09 ENCOUNTER — TELEPHONE (OUTPATIENT)
Dept: NEPHROLOGY | Facility: CLINIC | Age: 74
End: 2024-08-09

## 2024-08-09 DIAGNOSIS — I12.0 PARENCHYMAL RENAL HYPERTENSION, STAGE 5 CHRONIC KIDNEY DISEASE OR END STAGE RENAL DISEASE (HCC): ICD-10-CM

## 2024-08-09 DIAGNOSIS — N18.5 CKD (CHRONIC KIDNEY DISEASE) STAGE 5, GFR LESS THAN 15 ML/MIN (HCC): ICD-10-CM

## 2024-08-09 DIAGNOSIS — E11.21 DIABETIC NEPHROPATHY ASSOCIATED WITH TYPE 2 DIABETES MELLITUS (HCC): ICD-10-CM

## 2024-08-09 DIAGNOSIS — Z00.00 MEDICARE ANNUAL WELLNESS VISIT, SUBSEQUENT: ICD-10-CM

## 2024-08-09 DIAGNOSIS — E55.9 VITAMIN D DEFICIENCY: Chronic | ICD-10-CM

## 2024-08-09 DIAGNOSIS — N25.81 SECONDARY HYPERPARATHYROIDISM OF RENAL ORIGIN (HCC): Chronic | ICD-10-CM

## 2024-08-09 DIAGNOSIS — N18.5 ANEMIA IN STAGE 5 CHRONIC KIDNEY DISEASE, NOT ON CHRONIC DIALYSIS  (HCC): ICD-10-CM

## 2024-08-09 DIAGNOSIS — D63.1 ANEMIA IN STAGE 5 CHRONIC KIDNEY DISEASE, NOT ON CHRONIC DIALYSIS  (HCC): ICD-10-CM

## 2024-08-09 DIAGNOSIS — T78.40XS ALLERGY, SEQUELA: ICD-10-CM

## 2024-08-09 DIAGNOSIS — E78.2 MIXED HYPERLIPIDEMIA: ICD-10-CM

## 2024-08-09 DIAGNOSIS — D72.829 LEUKOCYTOSIS, UNSPECIFIED TYPE: ICD-10-CM

## 2024-08-09 DIAGNOSIS — R74.01 TRANSAMINITIS: ICD-10-CM

## 2024-08-09 LAB
ALBUMIN SERPL BCG-MCNC: 3.5 G/DL (ref 3.5–5)
ALP SERPL-CCNC: 55 U/L (ref 34–104)
ALT SERPL W P-5'-P-CCNC: 19 U/L (ref 7–52)
ANION GAP SERPL CALCULATED.3IONS-SCNC: 8 MMOL/L (ref 4–13)
AST SERPL W P-5'-P-CCNC: 18 U/L (ref 13–39)
BASOPHILS # BLD AUTO: 0.04 THOUSANDS/ÂΜL (ref 0–0.1)
BASOPHILS NFR BLD AUTO: 0 % (ref 0–1)
BILIRUB SERPL-MCNC: 0.36 MG/DL (ref 0.2–1)
BUN SERPL-MCNC: 39 MG/DL (ref 5–25)
CALCIUM SERPL-MCNC: 8.8 MG/DL (ref 8.4–10.2)
CHLORIDE SERPL-SCNC: 100 MMOL/L (ref 96–108)
CO2 SERPL-SCNC: 30 MMOL/L (ref 21–32)
CREAT SERPL-MCNC: 2.91 MG/DL (ref 0.6–1.3)
CREAT UR-MCNC: 97.5 MG/DL
EOSINOPHIL # BLD AUTO: 0.37 THOUSAND/ÂΜL (ref 0–0.61)
EOSINOPHIL NFR BLD AUTO: 3 % (ref 0–6)
ERYTHROCYTE [DISTWIDTH] IN BLOOD BY AUTOMATED COUNT: 13.7 % (ref 11.6–15.1)
GFR SERPL CREATININE-BSD FRML MDRD: 15 ML/MIN/1.73SQ M
GLUCOSE P FAST SERPL-MCNC: 100 MG/DL (ref 65–99)
HCT VFR BLD AUTO: 35.9 % (ref 34.8–46.1)
HGB BLD-MCNC: 11.4 G/DL (ref 11.5–15.4)
IMM GRANULOCYTES # BLD AUTO: 0.03 THOUSAND/UL (ref 0–0.2)
IMM GRANULOCYTES NFR BLD AUTO: 0 % (ref 0–2)
LYMPHOCYTES # BLD AUTO: 2.73 THOUSANDS/ÂΜL (ref 0.6–4.47)
LYMPHOCYTES NFR BLD AUTO: 25 % (ref 14–44)
MAGNESIUM SERPL-MCNC: 2.1 MG/DL (ref 1.9–2.7)
MCH RBC QN AUTO: 29 PG (ref 26.8–34.3)
MCHC RBC AUTO-ENTMCNC: 31.8 G/DL (ref 31.4–37.4)
MCV RBC AUTO: 91 FL (ref 82–98)
MONOCYTES # BLD AUTO: 0.52 THOUSAND/ÂΜL (ref 0.17–1.22)
MONOCYTES NFR BLD AUTO: 5 % (ref 4–12)
NEUTROPHILS # BLD AUTO: 7.2 THOUSANDS/ÂΜL (ref 1.85–7.62)
NEUTS SEG NFR BLD AUTO: 67 % (ref 43–75)
NRBC BLD AUTO-RTO: 0 /100 WBCS
PHOSPHATE SERPL-MCNC: 3.8 MG/DL (ref 2.3–4.1)
PLATELET # BLD AUTO: 307 THOUSANDS/UL (ref 149–390)
PMV BLD AUTO: 11.6 FL (ref 8.9–12.7)
POTASSIUM SERPL-SCNC: 4.1 MMOL/L (ref 3.5–5.3)
PROT SERPL-MCNC: 6.1 G/DL (ref 6.4–8.4)
PROT UR-MCNC: 250 MG/DL
PROT/CREAT UR: 2.56 MG/G{CREAT} (ref 0–0.1)
PTH-INTACT SERPL-MCNC: 150.7 PG/ML (ref 12–88)
RBC # BLD AUTO: 3.93 MILLION/UL (ref 3.81–5.12)
SODIUM SERPL-SCNC: 138 MMOL/L (ref 135–147)
WBC # BLD AUTO: 10.89 THOUSAND/UL (ref 4.31–10.16)

## 2024-08-09 PROCEDURE — 84100 ASSAY OF PHOSPHORUS: CPT

## 2024-08-09 PROCEDURE — 85025 COMPLETE CBC W/AUTO DIFF WBC: CPT

## 2024-08-09 PROCEDURE — 84156 ASSAY OF PROTEIN URINE: CPT

## 2024-08-09 PROCEDURE — 36415 COLL VENOUS BLD VENIPUNCTURE: CPT

## 2024-08-09 PROCEDURE — 83970 ASSAY OF PARATHORMONE: CPT

## 2024-08-09 PROCEDURE — 80053 COMPREHEN METABOLIC PANEL: CPT

## 2024-08-09 PROCEDURE — 82570 ASSAY OF URINE CREATININE: CPT

## 2024-08-09 PROCEDURE — 83735 ASSAY OF MAGNESIUM: CPT

## 2024-08-09 NOTE — TELEPHONE ENCOUNTER
----- Message from Donaldo Baires MD sent at 8/9/2024  1:34 PM EDT -----  Labs look good!  ----- Message -----  From: Lab, Background User  Sent: 8/9/2024  12:16 PM EDT  To: Donaldo Baires MD

## 2024-08-09 NOTE — TELEPHONE ENCOUNTER
Rescheduled patient for 4/5/23 [Post Op: _________] : a [unfilled] post op visit [FreeTextEntry1] : s/p lap appendectomy 7/26/24

## 2024-08-14 ENCOUNTER — OFFICE VISIT (OUTPATIENT)
Dept: UROLOGY | Facility: CLINIC | Age: 74
End: 2024-08-14
Payer: MEDICARE

## 2024-08-14 VITALS
WEIGHT: 155 LBS | OXYGEN SATURATION: 97 % | HEIGHT: 64 IN | BODY MASS INDEX: 26.46 KG/M2 | SYSTOLIC BLOOD PRESSURE: 130 MMHG | HEART RATE: 54 BPM | DIASTOLIC BLOOD PRESSURE: 70 MMHG

## 2024-08-14 DIAGNOSIS — N39.0 RECURRENT UTI: Primary | ICD-10-CM

## 2024-08-14 PROCEDURE — 99213 OFFICE O/P EST LOW 20 MIN: CPT | Performed by: PHYSICIAN ASSISTANT

## 2024-08-14 NOTE — PROGRESS NOTES
UROLOGY PROGRESS NOTE   Patient Identifiers: Trina Davis (MRN [id number])  Date of Service: 8/14/2024    Subjective:   74-year-old female history of recurrent UTI and incomplete bladder emptying.  She had been on CIC but has since stopped.  She takes methenamine for her recurrent UTIs and has not had a positive culture.  She also sees nephrology for her stage IV chronic kidney disease.    Reason for visit: Recurrent UTI follow-up    Objective:     VITALS:    Vitals:    08/14/24 1123   BP: 130/70   Pulse: (!) 54   SpO2: 97%           LABS:  Lab Results   Component Value Date    HGB 11.4 (L) 08/09/2024    HCT 35.9 08/09/2024    WBC 10.89 (H) 08/09/2024     08/09/2024   ]    Lab Results   Component Value Date    K 4.1 08/09/2024     08/09/2024    CO2 30 08/09/2024    BUN 39 (H) 08/09/2024    CREATININE 2.91 (H) 08/09/2024    CALCIUM 8.8 08/09/2024    GLUCOSE 168 (H) 11/30/2018   ]        INPATIENT MEDS:    Current Outpatient Medications:     acetaminophen (TYLENOL) 500 mg tablet, Take 1,000 mg by mouth every 6 (six) hours as needed for mild pain, Disp: , Rfl:     albuterol (Ventolin HFA) 90 mcg/act inhaler, Inhale 2 puffs every 6 (six) hours as needed for wheezing, Disp: 54 g, Rfl: 0    amLODIPine (NORVASC) 5 mg tablet, Take 2 tablets (10 mg total) by mouth daily, Disp: 180 tablet, Rfl: 1    aspirin (ECOTRIN LOW STRENGTH) 81 mg EC tablet, Take 1 tablet (81 mg total) by mouth daily Do not start before October 31, 2023., Disp: , Rfl: 0    B-D ULTRAFINE III SHORT PEN 31G X 8 MM MISC, , Disp: , Rfl: 3    Calcium Citrate 250 MG TABS, Take 2 tablets (500 mg total) by mouth in the morning, Disp: , Rfl:     Coenzyme Q10 (CoQ10) 100 MG CAPS, Take 100 mg by mouth in the morning Bed time, Disp: , Rfl:     Cyanocobalamin (Vitamin B12) 1000 MCG TBCR, Take 1,000 mcg by mouth once a week, Disp: , Rfl:     escitalopram (LEXAPRO) 10 mg tablet, TAKE 1 TABLET BY MOUTH EVERY DAY, Disp: 90 tablet, Rfl: 1    famotidine  "(PEPCID) 10 mg tablet, Take 1 tablet (10 mg total) by mouth daily Do not start before September 13, 2023., Disp: 30 tablet, Rfl: 0    hydrALAZINE (APRESOLINE) 25 mg tablet, Take 1 tablet (25 mg total) by mouth 2 (two) times a day, Disp: 180 tablet, Rfl: 0    Icosapent Ethyl (Vascepa) 0.5 g CAPS, Take 2 g by mouth 2 (two) times a day, Disp: 120 capsule, Rfl: 3    insulin aspart (NovoLOG) 100 units/mL injection, Inject under the skin 3 (three) times a day before meals 5units, 5units, 12units., Disp: , Rfl:     insulin detemir (Levemir FlexTouch) 100 Units/mL injection pen, Inject 20 Units under the skin every evening, Disp: , Rfl:     levothyroxine 100 mcg tablet, TAKE 1 TABLET BY MOUTH EVERY DAY, Disp: 90 tablet, Rfl: 1    methenamine hippurate (HIPREX) 1 g tablet, Take 1 tablet (1 g total) by mouth 2 (two) times a day with meals, Disp: 180 tablet, Rfl: 3    metoprolol tartrate (LOPRESSOR) 50 mg tablet, TAKE 0.5 TABLETS (25 MG TOTAL) BY MOUTH EVERY 12 (TWELVE) HOURS, Disp: 100 tablet, Rfl: 1    pantoprazole (PROTONIX) 40 mg tablet, Take 1 tablet (40 mg total) by mouth 2 (two) times a day, Disp: , Rfl:     polyethylene glycol (GOLYTELY) 4000 mL solution, Take 4,000 mL by mouth once for 1 dose, Disp: 4000 mL, Rfl: 0    pramipexole (MIRAPEX) 0.25 mg tablet, TAKE 1 TABLET (0.25 MG TOTAL) BY MOUTH DAILY AT BEDTIME, Disp: 90 tablet, Rfl: 1    Probiotic Product (PROBIOTIC BLEND PO), Take by mouth, Disp: , Rfl:     rosuvastatin (CRESTOR) 5 mg tablet, Take 1 tablet (5 mg total) by mouth daily, Disp: 90 tablet, Rfl: 3    torsemide (DEMADEX) 10 mg tablet, TAKE 10 MG. DAILY EXCEPT MONDAY, WEDNESDAY, FRIDAY 20 MG., Disp: 150 tablet, Rfl: 1    Vitamin D, Cholecalciferol, 25 MCG (1000 UT) TABS, Take 2,000 Units by mouth in the morning, Disp: , Rfl:       Physical Exam:   /70 (BP Location: Left arm, Patient Position: Sitting, Cuff Size: Child)   Pulse (!) 54   Ht 5' 4\" (1.626 m)   Wt 70.3 kg (155 lb)   SpO2 97%   BMI " 26.61 kg/m²   GEN: no acute distress    RESP: breathing comfortably with no accessory muscle use    ABD: soft, non-tender, non-distended   INCISION:    EXT: no significant peripheral edema       RADIOLOGY:   IMPRESSION:     Chronic medical renal disease.     Small bilateral renal simple cysts.     Left renal upper pole nonobstructing calculi.     Assessment:   #1.  Recurrent UTI  #2.  Nephrolithiasis  #3.  Stage IV chronic kidney disease    Plan:   -Follow-up in 1 year for her annual exam  -  -  -

## 2024-08-20 ENCOUNTER — PATIENT MESSAGE (OUTPATIENT)
Dept: NEPHROLOGY | Facility: CLINIC | Age: 74
End: 2024-08-20

## 2024-08-20 DIAGNOSIS — E78.2 MIXED HYPERLIPIDEMIA: ICD-10-CM

## 2024-08-20 DIAGNOSIS — I10 ESSENTIAL HYPERTENSION: ICD-10-CM

## 2024-08-20 RX ORDER — METOPROLOL TARTRATE 50 MG
50 TABLET ORAL EVERY 12 HOURS
Qty: 180 TABLET | Refills: 1 | Status: SHIPPED | OUTPATIENT
Start: 2024-08-20

## 2024-08-20 NOTE — PATIENT COMMUNICATION
Called patient to clarify how she is taking metoprolol.  She is currently taking 50 mg twice a day.  Explained that is probably why they say it is too soon because the order only says 25 mg twice daily.  She asked if we could send a new prescription.

## 2024-08-22 RX ORDER — ICOSAPENT ETHYL 0.5 G/1
2 CAPSULE ORAL 2 TIMES DAILY
Qty: 120 CAPSULE | Refills: 0 | Status: SHIPPED | OUTPATIENT
Start: 2024-08-22 | End: 2024-08-27

## 2024-08-26 DIAGNOSIS — E78.2 MIXED HYPERLIPIDEMIA: ICD-10-CM

## 2024-08-26 RX ORDER — ICOSAPENT ETHYL 0.5 G/1
2 CAPSULE ORAL 2 TIMES DAILY
Qty: 120 CAPSULE | Refills: 0 | Status: CANCELLED | OUTPATIENT
Start: 2024-08-26

## 2024-08-27 RX ORDER — ICOSAPENT ETHYL 1 G/1
2 CAPSULE ORAL 2 TIMES DAILY
Qty: 90 CAPSULE | Refills: 3 | Status: SHIPPED | OUTPATIENT
Start: 2024-08-27

## 2024-08-30 ENCOUNTER — ANESTHESIA EVENT (OUTPATIENT)
Dept: GASTROENTEROLOGY | Facility: HOSPITAL | Age: 74
End: 2024-08-30

## 2024-08-30 ENCOUNTER — ANESTHESIA (OUTPATIENT)
Dept: GASTROENTEROLOGY | Facility: HOSPITAL | Age: 74
End: 2024-08-30

## 2024-08-30 ENCOUNTER — HOSPITAL ENCOUNTER (OUTPATIENT)
Dept: GASTROENTEROLOGY | Facility: HOSPITAL | Age: 74
Setting detail: OUTPATIENT SURGERY
End: 2024-08-30
Attending: INTERNAL MEDICINE
Payer: MEDICARE

## 2024-08-30 VITALS
WEIGHT: 154 LBS | BODY MASS INDEX: 27.29 KG/M2 | HEART RATE: 53 BPM | SYSTOLIC BLOOD PRESSURE: 145 MMHG | HEIGHT: 63 IN | OXYGEN SATURATION: 97 % | DIASTOLIC BLOOD PRESSURE: 79 MMHG | TEMPERATURE: 97.2 F | RESPIRATION RATE: 16 BRPM

## 2024-08-30 DIAGNOSIS — R76.8 LOW IMMUNOGLOBULIN LEVEL: Primary | ICD-10-CM

## 2024-08-30 DIAGNOSIS — R19.4 CHANGE IN BOWEL HABITS: ICD-10-CM

## 2024-08-30 PROCEDURE — 88305 TISSUE EXAM BY PATHOLOGIST: CPT | Performed by: STUDENT IN AN ORGANIZED HEALTH CARE EDUCATION/TRAINING PROGRAM

## 2024-08-30 PROCEDURE — 45382 COLONOSCOPY W/CONTROL BLEED: CPT | Performed by: INTERNAL MEDICINE

## 2024-08-30 PROCEDURE — 45385 COLONOSCOPY W/LESION REMOVAL: CPT | Performed by: INTERNAL MEDICINE

## 2024-08-30 RX ORDER — LIDOCAINE HYDROCHLORIDE 10 MG/ML
INJECTION, SOLUTION EPIDURAL; INFILTRATION; INTRACAUDAL; PERINEURAL AS NEEDED
Status: DISCONTINUED | OUTPATIENT
Start: 2024-08-30 | End: 2024-08-30

## 2024-08-30 RX ORDER — PROPOFOL 10 MG/ML
INJECTION, EMULSION INTRAVENOUS CONTINUOUS PRN
Status: DISCONTINUED | OUTPATIENT
Start: 2024-08-30 | End: 2024-08-30

## 2024-08-30 RX ORDER — PROPOFOL 10 MG/ML
INJECTION, EMULSION INTRAVENOUS AS NEEDED
Status: DISCONTINUED | OUTPATIENT
Start: 2024-08-30 | End: 2024-08-30

## 2024-08-30 RX ORDER — SODIUM CHLORIDE, SODIUM LACTATE, POTASSIUM CHLORIDE, CALCIUM CHLORIDE 600; 310; 30; 20 MG/100ML; MG/100ML; MG/100ML; MG/100ML
INJECTION, SOLUTION INTRAVENOUS CONTINUOUS PRN
Status: DISCONTINUED | OUTPATIENT
Start: 2024-08-30 | End: 2024-08-30

## 2024-08-30 RX ADMIN — PROPOFOL 100 MCG/KG/MIN: 10 INJECTION, EMULSION INTRAVENOUS at 08:54

## 2024-08-30 RX ADMIN — PROPOFOL 100 MG: 10 INJECTION, EMULSION INTRAVENOUS at 08:54

## 2024-08-30 RX ADMIN — SODIUM CHLORIDE, SODIUM LACTATE, POTASSIUM CHLORIDE, AND CALCIUM CHLORIDE: .6; .31; .03; .02 INJECTION, SOLUTION INTRAVENOUS at 08:42

## 2024-08-30 RX ADMIN — LIDOCAINE HYDROCHLORIDE 50 MG: 10 INJECTION, SOLUTION EPIDURAL; INFILTRATION; INTRACAUDAL at 08:54

## 2024-08-30 NOTE — ANESTHESIA PREPROCEDURE EVALUATION
Procedure:  COLONOSCOPY    Relevant Problems   CARDIO   (+) HLD (hyperlipidemia)      ENDO   (+) Secondary hyperparathyroidism of renal origin (HCC)   (+) Type 2 diabetes mellitus with stage 4 chronic kidney disease, with long-term current use of insulin (HCC)      GI/HEPATIC   (+) Gastroesophageal reflux disease without esophagitis      HEMATOLOGY   (+) Anemia in stage 5 chronic kidney disease, not on chronic dialysis  (HCC)      MUSCULOSKELETAL   (+) Fibromyalgia      NEURO/PSYCH   (+) Fibromyalgia   (+) Gastroparesis diabeticorum  (HCC)   (+) Mild vascular dementia without behavioral disturbance, psychotic disturbance, mood disturbance, or anxiety (HCC)   (+) Reactive depression      PULMONARY   (+) COPD (chronic obstructive pulmonary disease) ([facility])   (+) SHAYAN (obstructive sleep apnea)        Physical Exam    Airway    Mallampati score: II  TM Distance: >3 FB  Neck ROM: full     Dental   No notable dental hx     Cardiovascular  Cardiovascular exam normal    Pulmonary  Pulmonary exam normal     Other Findings  post-pubertal.      Anesthesia Plan  ASA Score- 3     Anesthesia Type- IV sedation with anesthesia with ASA Monitors.         Additional Monitors:     Airway Plan:            Plan Factors-Exercise tolerance (METS): >4 METS.    Chart reviewed. EKG reviewed.  Existing labs reviewed. Patient summary reviewed.    Patient is not a current smoker.              Induction-     Postoperative Plan-     Perioperative Resuscitation Plan - Level 1 - Full Code.       Informed Consent- Anesthetic plan and risks discussed with patient and spouse.  I personally reviewed this patient with the CRNA. Discussed and agreed on the Anesthesia Plan with the CRNA..

## 2024-08-30 NOTE — H&P
"History and Physical -  [facility]  Trina Davis 74 y.o. female MRN: [id number]    HPI: Trina Davis is a 74 y.o. year old female who presents for colon cancer screening, has history of polyps.      Review of Systems    Historical Information   Past Medical History:   Diagnosis Date    Actinic keratosis     Acute cystitis without hematuria 04/28/2023    Acute cystitis without hematuria 04/28/2023    Acute respiratory failure with hypoxia (HCC)     Acute-on-chronic kidney injury  (HCC)     NIKOS (acute kidney injury) (HCC) 05/08/2020    Allergic     Allergic rhinitis     Ambulatory dysfunction 10/22/2020    AMS (altered mental status) 07/14/2020    Anesthesia     pt reports \"had to use double lumen endo tube for hiatal hernia repair/so surgeon could get to where he needed to work\"    Arthritis     Aspiration into airway     Michael esophagus 1993    Lot of stress with children    Basal cell carcinoma 2007    left cheek     BCC (basal cell carcinoma) 05/27/2021    Left Nasal tip    Breast discharge 06/19/2023    Breast pain 06/19/2023    Cancer (HCC)     squamous cell cancer on forhead    Cancer (HCC)     basal cell on nose     Cholelithiasis     Chronic kidney disease 2000, 2018    Stones, kidney disease stage 4    Chronic pain disorder     bilat feet and joint pain on occas    Colon polyp     Confusion 10/30/2023    Constipation     COPD (chronic obstructive pulmonary disease) (HCC)     COVID-19 08/2021    recovered at home/did receive monoclonal infusion    COVID-19 virus infection 08/16/2021    Dental bridge present     Depression     Diabetes mellitus (HCC)     Type 2    Diabetic neuropathy (HCC)     Difficulty walking 2017    Disease of thyroid gland     Dyspnea     Elevated liver enzymes 04/04/2023    Epigastric abdominal pain with severe diabetic gastroparesis, status post EGD/Botox injection, 5/14 05/17/2021    Facial abrasion, initial encounter 03/22/2023    Fall 03/22/2023    Family history " of thyroid problem     Fatty liver     Fibromyalgia, primary     Fracture of orbital floor, blow-out, right, closed, with routine healing, subsequent encounter 03/22/2023    GERD (gastroesophageal reflux disease)     Heart burn     Hiatal hernia     History of cholecystectomy 10/27/2023    History of colon polyps 07/30/2021    History of kidney stones 09/29/2020    Hx of recurrent kidney stones    History of kidney stones 09/29/2020    Hx of recurrent kidney stones  Formatting of this note might be different from the original. Hx of recurrent kidney stones  Last Assessment & Plan:  Formatting of this note might be different from the original. We will set her up for follow-up in 3 months with a KUB prior.  She knows to call if she has any kidney stone type pain in the meantime.    History of Nissen fundoplication 10/27/2023    History of pneumonia     History of repair of hiatal hernia 05/03/2020    Hyperlipidemia     Hyperplasia, parathyroid (HCC)     Hypertension     Hypertensive urgency 10/27/2023    Hyponatremia 04/26/2023    Hypotension 04/13/2022    Kidney problem     Kidney stone     Left hip pain 10/05/2023    Leukocytosis 07/14/2020    Memory loss Julu2 2020    Motion sickness     Motion sickness     Multiple closed fractures of ribs of right side 03/22/2023    Nasal bones, closed fracture 03/22/2023    Neck pain     Obesity 1978    Obesity (BMI 30.0-34.9)     Other chest pain 09/13/2021    Other fatigue 09/21/2023    Peripheral neuropathy     Pollen allergies     Preoperative clearance 06/27/2019    RLS (restless legs syndrome)     S/P foot surgery 07/20/2022    SCC (squamous cell carcinoma) 05/04/2021    left mid forehead    SCC (squamous cell carcinoma) 2023    chest    Seasonal allergies     Shingles 01 05 23    Sleep apnea     has inspire    Squamous cell skin cancer 2007    left cheek     Stroke (HCC)     Swollen ankles     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, left     great toe fusion    Urinary  incontinence     Urinary tract infection 03/28/2022    Wears glasses      Past Surgical History:   Procedure Laterality Date    ABDOMINAL SURGERY  1997,2015,1972    Nissen x2 1972 tubal ligarion    ARTHROSCOPY KNEE Right     BREAST BIOPSY      stereotactic-benign    BREAST BIOPSY      stereo-benign    BREAST CYST EXCISION Bilateral     benign    BREAST EXCISIONAL BIOPSY      unknown date-benign    BREAST EXCISIONAL BIOPSY      unknown date-benign    BREAST EXCISIONAL BIOPSY      unknown date-benign    BREAST EXCISIONAL BIOPSY      unknown age-benign    CARDIAC CATHETERIZATION      CATARACT EXTRACTION Right     CHOLECYSTECTOMY      COLONOSCOPY      EXAMINATION UNDER ANESTHESIA N/A 06/24/2021    Procedure: EXAM UNDER ANESTHESIA (EUA), DISE;  Surgeon: Gregory Maier MD;  [facility]: [facility];  Service: ENT    HERNIA REPAIR      hiatal    HIATAL HERNIA REPAIR      KNEE SURGERY Right     Torn maniscus lap surg    LIPOSUCTION      LYMPH NODE BIOPSY      MOHS SURGERY  05/20/2021    left mid forehead-Mickey    MOHS SURGERY Left 05/27/2021    Left nasal tip- mickey     DC LIGATION/BIOPSY TEMPORAL ARTERY Left 01/05/2023    Procedure: BIOPSY ARTERY TEMPORAL;  Surgeon: Alec Dennis DO;  Location: [facility];  Service: Vascular    DC OPEN IMPLANTATION CRANIAL NERVE BOOM & PULSE GEN N/A 11/10/2021    Procedure: INSERTION UPPER AIRWAY STIMULATOR, INSPIRE IMPLANT;  Surgeon: Gregory Maier MD;  Location: [facility];  Service: ENT    DC UNLISTED PROCEDURE FOOT/TOES Right 07/19/2022    Procedure: 1st metatarsal phalangeal joint fusion;  Surgeon: Dede Bonner DPM;  Location: [facility];  Service: Podiatry    REDUCTION MAMMAPLASTY Bilateral 2000    REDUCTION MAMMAPLASTY      SKIN BIOPSY      SKIN CANCER EXCISION  2007    squamous cell carcinoma     SKIN CANCER EXCISION  2007    basal cell carcinoma    SKIN CANCER EXCISION  2023    SCCIS chest    SQUAMOUS CELL CARCINOMA EXCISION      TOE SURGERY Right 08/04/2022    Right  Great Toe Fusion    TONSILLECTOMY      TUBAL LIGATION      UPPER GASTROINTESTINAL ENDOSCOPY      US GUIDED BREAST BIOPSY RIGHT COMPLETE Right 2022     Social History   Social History     Substance and Sexual Activity   Alcohol Use Yes    Comment: 1 or 2 a year     Social History     Substance and Sexual Activity   Drug Use No     Social History     Tobacco Use   Smoking Status Former    Current packs/day: 0.00    Average packs/day: 2.0 packs/day for 10.0 years (20.0 ttl pk-yrs)    Types: Cigarettes    Start date: 1966    Quit date: 1976    Years since quittin.6    Passive exposure: Past   Smokeless Tobacco Never   Tobacco Comments    Smoked 2 pack a day     Family History   Problem Relation Age of Onset    Heart disease Mother     Depression Mother     Hypertension Mother     COPD Mother     Hearing loss Mother     Anxiety disorder Mother     Heart disease Father     Lung cancer Father 70        Smoker     Cancer Father         brain    Alcohol abuse Father     Dementia Father     Hypertension Father     Thyroid disease Father     COPD Father     Arthritis Father     Brain cancer Father 74    Hypertension Sister     Diabetes Sister     Heart disease Sister     Thyroid disease Sister     Cancer Sister         Lympoma, lung    Lung cancer Sister 79    Anxiety disorder Sister     Migraines Sister     Hypertension Brother     Diabetes Brother     Cancer Brother         Throat    Dementia Brother     Stroke Brother     Hypertension Brother     Heart disease Brother     Diabetes Brother     Hypertension Brother     No Known Problems Son     No Known Problems Son     Brain cancer Paternal Aunt         unknown age    Diabetes Sister     Hypertension Sister     Diabetes Brother     Breast cancer Neg Hx        Meds/Allergies     Not in a hospital admission.    Allergies   Allergen Reactions    Ciprofloxacin Hives    Sulfamethoxazole-Trimethoprim Tongue Swelling       Objective     BP (!) 187/82   Pulse 58   " Temp 97.7 °F (36.5 °C) (Temporal)   Resp 19   Ht 5' 3\" (1.6 m)   Wt 69.9 kg (154 lb)   SpO2 95%   BMI 27.28 kg/m²       PHYSICAL EXAM    Gen: NAD  CV: RRR  CHEST: Clear  ABD: soft, NT/ND  EXT: no edema  Neuro: AAO      ASSESSMENT/PLAN:  This is a 74 y.o. year old female here for colon cancer screening, has history of polyps.    PLAN:   Procedure: Colonoscopy.      "

## 2024-08-30 NOTE — ANESTHESIA POSTPROCEDURE EVALUATION
Post-Op Assessment Note    CV Status:  Stable  Pain Score: 0    Pain management: adequate       Mental Status:  Sleepy   Hydration Status:  Euvolemic   PONV Controlled:  Controlled   Airway Patency:  Patent     Post Op Vitals Reviewed: Yes    No anethesia notable event occurred.    Staff: Anesthesiologist, CRNA               /61 (08/30/24 0914)    Temp      Pulse (!) 52 (08/30/24 0914)   Resp 12 (08/30/24 0914)    SpO2 99 % (08/30/24 0914)

## 2024-09-03 ENCOUNTER — DOCUMENTATION (OUTPATIENT)
Dept: HEMATOLOGY ONCOLOGY | Facility: CLINIC | Age: 74
End: 2024-09-03

## 2024-09-03 DIAGNOSIS — K21.9 GASTROESOPHAGEAL REFLUX DISEASE, UNSPECIFIED WHETHER ESOPHAGITIS PRESENT: ICD-10-CM

## 2024-09-03 NOTE — PROGRESS NOTES
Received referral to [facility] for low immunoglobulin level.  This is not a diagnosis we see and pt should be referred to immunologyThis referral will be closed.  Please notify the patient.    Thank you

## 2024-09-04 DIAGNOSIS — R76.8 LOW IMMUNOGLOBULIN LEVEL: Primary | ICD-10-CM

## 2024-09-04 RX ORDER — PANTOPRAZOLE SODIUM 40 MG/1
40 TABLET, DELAYED RELEASE ORAL 2 TIMES DAILY
Qty: 180 TABLET | Refills: 1 | Status: SHIPPED | OUTPATIENT
Start: 2024-09-04

## 2024-09-04 NOTE — PROGRESS NOTES
Called patient and spoke to her regarding immunology referral. Patient aware and verbalized understanding. Advised to return call to office with any questions/concerns.

## 2024-09-05 PROCEDURE — 88305 TISSUE EXAM BY PATHOLOGIST: CPT | Performed by: STUDENT IN AN ORGANIZED HEALTH CARE EDUCATION/TRAINING PROGRAM

## 2024-09-12 ENCOUNTER — TELEPHONE (OUTPATIENT)
Age: 74
End: 2024-09-12

## 2024-09-12 NOTE — TELEPHONE ENCOUNTER
Pt states she has not received call from Immunology dept. Pt provided with following:     [facility] [phone]    Radha Nava MD [phone]    Pt appreciative.

## 2024-09-24 ENCOUNTER — NURSE TRIAGE (OUTPATIENT)
Age: 74
End: 2024-09-24

## 2024-09-24 ENCOUNTER — APPOINTMENT (OUTPATIENT)
Dept: LAB | Facility: AMBULARY SURGERY CENTER | Age: 74
DRG: 394 | End: 2024-09-24
Payer: MEDICARE

## 2024-09-24 ENCOUNTER — APPOINTMENT (EMERGENCY)
Dept: RADIOLOGY | Facility: HOSPITAL | Age: 74
DRG: 394 | End: 2024-09-24
Payer: MEDICARE

## 2024-09-24 ENCOUNTER — HOSPITAL ENCOUNTER (INPATIENT)
Facility: HOSPITAL | Age: 74
LOS: 4 days | Discharge: HOME | DRG: 394 | End: 2024-09-28
Attending: EMERGENCY MEDICINE | Admitting: INTERNAL MEDICINE
Payer: MEDICARE

## 2024-09-24 DIAGNOSIS — D80.3 IMMUNOGLOBULIN G SUBCLASS DEFICIENCY (HCC): ICD-10-CM

## 2024-09-24 DIAGNOSIS — D64.9 ANEMIA: ICD-10-CM

## 2024-09-24 DIAGNOSIS — R06.02 SOB (SHORTNESS OF BREATH): ICD-10-CM

## 2024-09-24 DIAGNOSIS — B99.9 RECURRENT INFECTIONS: ICD-10-CM

## 2024-09-24 DIAGNOSIS — D80.1 HYPOGAMMAGLOBULINEMIA (HCC): ICD-10-CM

## 2024-09-24 DIAGNOSIS — K92.2 GI BLEED: Primary | ICD-10-CM

## 2024-09-24 DIAGNOSIS — K62.5 RECTAL BLEEDING: ICD-10-CM

## 2024-09-24 DIAGNOSIS — D62 ACUTE BLOOD LOSS ANEMIA: ICD-10-CM

## 2024-09-24 PROBLEM — K92.1 BLOODY STOOL: Status: ACTIVE | Noted: 2024-09-24

## 2024-09-24 PROBLEM — N18.5 STAGE 5 CHRONIC KIDNEY DISEASE NOT ON CHRONIC DIALYSIS (HCC): Status: ACTIVE | Noted: 2024-09-24

## 2024-09-24 LAB
2HR DELTA HS TROPONIN: 0 NG/L
4HR DELTA HS TROPONIN: -1 NG/L
ABO GROUP BLD: NORMAL
ALBUMIN SERPL BCG-MCNC: 3.8 G/DL (ref 3.5–5)
ALP SERPL-CCNC: 67 U/L (ref 34–104)
ALT SERPL W P-5'-P-CCNC: 48 U/L (ref 7–52)
ANION GAP SERPL CALCULATED.3IONS-SCNC: 9 MMOL/L (ref 4–13)
AST SERPL W P-5'-P-CCNC: 36 U/L (ref 13–39)
BASOPHILS # BLD AUTO: 0.05 THOUSANDS/ΜL (ref 0–0.1)
BASOPHILS NFR BLD AUTO: 1 % (ref 0–1)
BILIRUB SERPL-MCNC: 0.25 MG/DL (ref 0.2–1)
BLD GP AB SCN SERPL QL: NEGATIVE
BNP SERPL-MCNC: 237 PG/ML (ref 0–100)
BUN SERPL-MCNC: 63 MG/DL (ref 5–25)
CALCIUM SERPL-MCNC: 8.8 MG/DL (ref 8.4–10.2)
CARDIAC TROPONIN I PNL SERPL HS: 5 NG/L
CARDIAC TROPONIN I PNL SERPL HS: 6 NG/L
CARDIAC TROPONIN I PNL SERPL HS: 6 NG/L
CHLORIDE SERPL-SCNC: 102 MMOL/L (ref 96–108)
CO2 SERPL-SCNC: 25 MMOL/L (ref 21–32)
CREAT SERPL-MCNC: 3.44 MG/DL (ref 0.6–1.3)
EOSINOPHIL # BLD AUTO: 0.32 THOUSAND/ΜL (ref 0–0.61)
EOSINOPHIL NFR BLD AUTO: 4 % (ref 0–6)
ERYTHROCYTE [DISTWIDTH] IN BLOOD BY AUTOMATED COUNT: 14.2 % (ref 11.6–15.1)
GFR SERPL CREATININE-BSD FRML MDRD: 12 ML/MIN/1.73SQ M
GLUCOSE SERPL-MCNC: 145 MG/DL (ref 65–140)
GLUCOSE SERPL-MCNC: 204 MG/DL (ref 65–140)
GLUCOSE SERPL-MCNC: 292 MG/DL (ref 65–140)
GLUCOSE SERPL-MCNC: 78 MG/DL (ref 65–140)
GLUCOSE SERPL-MCNC: 96 MG/DL (ref 65–140)
HCT VFR BLD AUTO: 28 % (ref 34.8–46.1)
HGB BLD-MCNC: 8.7 G/DL (ref 11.5–15.4)
IMM GRANULOCYTES # BLD AUTO: 0.03 THOUSAND/UL (ref 0–0.2)
IMM GRANULOCYTES NFR BLD AUTO: 0 % (ref 0–2)
LYMPHOCYTES # BLD AUTO: 1.84 THOUSANDS/ΜL (ref 0.6–4.47)
LYMPHOCYTES NFR BLD AUTO: 24 % (ref 14–44)
MCH RBC QN AUTO: 26.9 PG (ref 26.8–34.3)
MCHC RBC AUTO-ENTMCNC: 31.1 G/DL (ref 31.4–37.4)
MCV RBC AUTO: 87 FL (ref 82–98)
MONOCYTES # BLD AUTO: 0.52 THOUSAND/ΜL (ref 0.17–1.22)
MONOCYTES NFR BLD AUTO: 7 % (ref 4–12)
NEUTROPHILS # BLD AUTO: 4.88 THOUSANDS/ΜL (ref 1.85–7.62)
NEUTS SEG NFR BLD AUTO: 64 % (ref 43–75)
NRBC BLD AUTO-RTO: 0 /100 WBCS
PLATELET # BLD AUTO: 289 THOUSANDS/UL (ref 149–390)
PMV BLD AUTO: 11 FL (ref 8.9–12.7)
POTASSIUM SERPL-SCNC: 3.8 MMOL/L (ref 3.5–5.3)
PROT SERPL-MCNC: 6 G/DL (ref 6.4–8.4)
RBC # BLD AUTO: 3.23 MILLION/UL (ref 3.81–5.12)
RH BLD: POSITIVE
SODIUM SERPL-SCNC: 136 MMOL/L (ref 135–147)
SPECIMEN EXPIRATION DATE: NORMAL
WBC # BLD AUTO: 7.64 THOUSAND/UL (ref 4.31–10.16)

## 2024-09-24 PROCEDURE — 93308 TTE F-UP OR LMTD: CPT | Performed by: EMERGENCY MEDICINE

## 2024-09-24 PROCEDURE — 99285 EMERGENCY DEPT VISIT HI MDM: CPT

## 2024-09-24 PROCEDURE — 76604 US EXAM CHEST: CPT | Performed by: EMERGENCY MEDICINE

## 2024-09-24 PROCEDURE — 83036 HEMOGLOBIN GLYCOSYLATED A1C: CPT

## 2024-09-24 PROCEDURE — 84165 PROTEIN E-PHORESIS SERUM: CPT

## 2024-09-24 PROCEDURE — 86355 B CELLS TOTAL COUNT: CPT

## 2024-09-24 PROCEDURE — 83880 ASSAY OF NATRIURETIC PEPTIDE: CPT

## 2024-09-24 PROCEDURE — 93005 ELECTROCARDIOGRAM TRACING: CPT

## 2024-09-24 PROCEDURE — 86940 HEMOLYSINS/AGGLUTININS AUTO: CPT

## 2024-09-24 PROCEDURE — 99222 1ST HOSP IP/OBS MODERATE 55: CPT | Performed by: STUDENT IN AN ORGANIZED HEALTH CARE EDUCATION/TRAINING PROGRAM

## 2024-09-24 PROCEDURE — 94640 AIRWAY INHALATION TREATMENT: CPT

## 2024-09-24 PROCEDURE — 82948 REAGENT STRIP/BLOOD GLUCOSE: CPT

## 2024-09-24 PROCEDURE — 84484 ASSAY OF TROPONIN QUANT: CPT | Performed by: EMERGENCY MEDICINE

## 2024-09-24 PROCEDURE — 80053 COMPREHEN METABOLIC PANEL: CPT | Performed by: EMERGENCY MEDICINE

## 2024-09-24 PROCEDURE — 36415 COLL VENOUS BLD VENIPUNCTURE: CPT

## 2024-09-24 PROCEDURE — 86359 T CELLS TOTAL COUNT: CPT

## 2024-09-24 PROCEDURE — 99223 1ST HOSP IP/OBS HIGH 75: CPT | Performed by: INTERNAL MEDICINE

## 2024-09-24 PROCEDURE — 99285 EMERGENCY DEPT VISIT HI MDM: CPT | Performed by: EMERGENCY MEDICINE

## 2024-09-24 PROCEDURE — 86360 T CELL ABSOLUTE COUNT/RATIO: CPT

## 2024-09-24 PROCEDURE — 86901 BLOOD TYPING SEROLOGIC RH(D): CPT

## 2024-09-24 PROCEDURE — 86900 BLOOD TYPING SEROLOGIC ABO: CPT

## 2024-09-24 PROCEDURE — 86317 IMMUNOASSAY INFECTIOUS AGENT: CPT

## 2024-09-24 PROCEDURE — 71046 X-RAY EXAM CHEST 2 VIEWS: CPT

## 2024-09-24 PROCEDURE — 85025 COMPLETE CBC W/AUTO DIFF WBC: CPT | Performed by: EMERGENCY MEDICINE

## 2024-09-24 PROCEDURE — 86850 RBC ANTIBODY SCREEN: CPT

## 2024-09-24 RX ORDER — PRAMIPEXOLE DIHYDROCHLORIDE 0.25 MG/1
0.25 TABLET ORAL
Status: DISCONTINUED | OUTPATIENT
Start: 2024-09-24 | End: 2024-09-24

## 2024-09-24 RX ORDER — IPRATROPIUM BROMIDE AND ALBUTEROL SULFATE 2.5; .5 MG/3ML; MG/3ML
3 SOLUTION RESPIRATORY (INHALATION) ONCE
Status: COMPLETED | OUTPATIENT
Start: 2024-09-24 | End: 2024-09-24

## 2024-09-24 RX ORDER — AMLODIPINE BESYLATE 10 MG/1
10 TABLET ORAL DAILY
Status: DISCONTINUED | OUTPATIENT
Start: 2024-09-25 | End: 2024-09-28 | Stop reason: HOSPADM

## 2024-09-24 RX ORDER — HYDRALAZINE HYDROCHLORIDE 25 MG/1
25 TABLET, FILM COATED ORAL 2 TIMES DAILY
Status: DISCONTINUED | OUTPATIENT
Start: 2024-09-24 | End: 2024-09-28 | Stop reason: HOSPADM

## 2024-09-24 RX ORDER — DEXTROSE MONOHYDRATE AND SODIUM CHLORIDE 5; .9 G/100ML; G/100ML
75 INJECTION, SOLUTION INTRAVENOUS CONTINUOUS
Status: DISCONTINUED | OUTPATIENT
Start: 2024-09-24 | End: 2024-09-24

## 2024-09-24 RX ORDER — LANOLIN ALCOHOL/MO/W.PET/CERES
1 CREAM (GRAM) TOPICAL
Status: DISCONTINUED | OUTPATIENT
Start: 2024-09-25 | End: 2024-09-28 | Stop reason: HOSPADM

## 2024-09-24 RX ORDER — ESCITALOPRAM OXALATE 10 MG/1
10 TABLET ORAL DAILY
Status: DISCONTINUED | OUTPATIENT
Start: 2024-09-25 | End: 2024-09-28 | Stop reason: HOSPADM

## 2024-09-24 RX ORDER — TORSEMIDE 20 MG/1
20 TABLET ORAL
Status: DISCONTINUED | OUTPATIENT
Start: 2024-09-25 | End: 2024-09-25

## 2024-09-24 RX ORDER — PRAVASTATIN SODIUM 40 MG
40 TABLET ORAL
Status: DISCONTINUED | OUTPATIENT
Start: 2024-09-24 | End: 2024-09-28 | Stop reason: HOSPADM

## 2024-09-24 RX ORDER — SODIUM CHLORIDE 9 MG/ML
75 INJECTION, SOLUTION INTRAVENOUS CONTINUOUS
Status: DISCONTINUED | OUTPATIENT
Start: 2024-09-24 | End: 2024-09-25

## 2024-09-24 RX ORDER — INSULIN LISPRO 100 [IU]/ML
5 INJECTION, SOLUTION INTRAVENOUS; SUBCUTANEOUS
Status: DISCONTINUED | OUTPATIENT
Start: 2024-09-24 | End: 2024-09-25

## 2024-09-24 RX ORDER — METHENAMINE HIPPURATE 1000 MG/1
1 TABLET ORAL 2 TIMES DAILY WITH MEALS
Status: DISCONTINUED | OUTPATIENT
Start: 2024-09-24 | End: 2024-09-24 | Stop reason: ALTCHOICE

## 2024-09-24 RX ORDER — PANTOPRAZOLE SODIUM 40 MG/1
40 TABLET, DELAYED RELEASE ORAL 2 TIMES DAILY
Status: DISCONTINUED | OUTPATIENT
Start: 2024-09-24 | End: 2024-09-28 | Stop reason: HOSPADM

## 2024-09-24 RX ORDER — ICOSAPENT ETHYL 1 G/1
2 CAPSULE ORAL 2 TIMES DAILY
Status: DISCONTINUED | OUTPATIENT
Start: 2024-09-24 | End: 2024-09-24

## 2024-09-24 RX ORDER — LEVOTHYROXINE SODIUM 100 UG/1
100 TABLET ORAL
Status: DISCONTINUED | OUTPATIENT
Start: 2024-09-25 | End: 2024-09-28 | Stop reason: HOSPADM

## 2024-09-24 RX ORDER — UBIDECARENONE 30 MG
100 CAPSULE ORAL DAILY
Status: DISCONTINUED | OUTPATIENT
Start: 2024-09-24 | End: 2024-09-28 | Stop reason: HOSPADM

## 2024-09-24 RX ORDER — TORSEMIDE 10 MG/1
10 TABLET ORAL
Status: DISCONTINUED | OUTPATIENT
Start: 2024-09-26 | End: 2024-09-25

## 2024-09-24 RX ORDER — METOPROLOL TARTRATE 50 MG
50 TABLET ORAL EVERY 12 HOURS
Status: DISCONTINUED | OUTPATIENT
Start: 2024-09-24 | End: 2024-09-28 | Stop reason: HOSPADM

## 2024-09-24 RX ORDER — TORSEMIDE 10 MG/1
10 TABLET ORAL DAILY
Status: DISCONTINUED | OUTPATIENT
Start: 2024-09-25 | End: 2024-09-24 | Stop reason: SDUPTHER

## 2024-09-24 RX ORDER — INSULIN LISPRO 100 [IU]/ML
1-5 INJECTION, SOLUTION INTRAVENOUS; SUBCUTANEOUS
Status: DISCONTINUED | OUTPATIENT
Start: 2024-09-24 | End: 2024-09-28 | Stop reason: HOSPADM

## 2024-09-24 RX ORDER — CHLORAL HYDRATE 500 MG
1000 CAPSULE ORAL DAILY
Status: DISCONTINUED | OUTPATIENT
Start: 2024-09-25 | End: 2024-09-28 | Stop reason: HOSPADM

## 2024-09-24 RX ADMIN — SODIUM CHLORIDE 75 ML/HR: 0.9 INJECTION, SOLUTION INTRAVENOUS at 19:24

## 2024-09-24 RX ADMIN — INSULIN DETEMIR 20 UNITS: 100 INJECTION, SOLUTION SUBCUTANEOUS at 19:33

## 2024-09-24 RX ADMIN — INSULIN LISPRO 1 UNITS: 100 INJECTION, SOLUTION INTRAVENOUS; SUBCUTANEOUS at 21:21

## 2024-09-24 RX ADMIN — PRAVASTATIN SODIUM 40 MG: 40 TABLET ORAL at 18:52

## 2024-09-24 RX ADMIN — IPRATROPIUM BROMIDE AND ALBUTEROL SULFATE 3 ML: .5; 3 SOLUTION RESPIRATORY (INHALATION) at 13:04

## 2024-09-24 RX ADMIN — Medication 100 MG: at 18:52

## 2024-09-24 RX ADMIN — HYDRALAZINE HYDROCHLORIDE 25 MG: 25 TABLET ORAL at 21:16

## 2024-09-24 RX ADMIN — INSULIN LISPRO 5 UNITS: 100 INJECTION, SOLUTION INTRAVENOUS; SUBCUTANEOUS at 19:33

## 2024-09-24 RX ADMIN — DEXTROSE AND SODIUM CHLORIDE 75 ML/HR: 5; .9 INJECTION, SOLUTION INTRAVENOUS at 15:21

## 2024-09-24 RX ADMIN — METOPROLOL TARTRATE 50 MG: 50 TABLET, FILM COATED ORAL at 18:52

## 2024-09-24 RX ADMIN — PANTOPRAZOLE SODIUM 40 MG: 40 TABLET, DELAYED RELEASE ORAL at 21:16

## 2024-09-24 RX ADMIN — Medication 2000 UNITS: at 18:52

## 2024-09-24 NOTE — ED PROCEDURE NOTE
Procedure  POC Cardiac US    Date/Time: 9/24/2024 2:14 PM    Performed by: Rip Frausto MD  Authorized by: Rip Frausto MD    Patient location:  ED  Other Assisting Provider: Yes (comment) (Dr. Hebert, Dr. Dillard, MS Sg, MS Liz)    Procedure details:     Exam Type:  Diagnostic    Indications: chest pain and respiratory distress      Assessment / Evaluation for: cardiac function and pericardial effusion      Exam Type: initial exam      Image quality: diagnostic      Image availability:  Images available in PACS  Patient Details:     Cardiac Rhythm:  Regular    Mechanical ventilation: No    Cardiac findings:     Echo technique: limited 2D      Views obtained: parasternal long axis, parasternal short axis, subcostal and apical      Pericardial effusion: absent      Tamponade physiology: absent      Wall motion: normal      LV systolic function: normal      RV dilation: none    Pulmonary findings:     Left Lung Findings: left lung sliding      Right lung findings: right lung sliding      B-lines: no B-lines present                     Rip Frausto MD  09/24/24 [id number]

## 2024-09-24 NOTE — TELEPHONE ENCOUNTER
"Reason for Disposition   MODERATE difficulty breathing (e.g., speaks in phrases, SOB even at rest, pulse 100-120) of new-onset or worse than normal    Answer Assessment - Initial Assessment Questions  1. RESPIRATORY STATUS: \"Describe your breathing?\" (e.g., wheezing, shortness of breath, unable to speak, severe coughing)       Shortness of breath, even when talking. Can't walk far, gets a little dizzy     2. ONSET: \"When did this breathing problem begin?\"       About 3 days ago     3. PATTERN \"Does the difficult breathing come and go, or has it been constant since it started?\"       Always there even with rest    4. SEVERITY: \"How bad is your breathing?\" (e.g., mild, moderate, severe)     - MILD: No SOB at rest, mild SOB with walking, speaks normally in sentences, can lay down, no retractions, pulse < 100.     - MODERATE: SOB at rest, SOB with minimal exertion and prefers to sit, cannot lie down flat, speaks in phrases, mild retractions, audible wheezing, pulse 100-120.     - SEVERE: Very SOB at rest, speaks in single words, struggling to breathe, sitting hunched forward, retractions, pulse > 120       Moderate     5. RECURRENT SYMPTOM: \"Have you had difficulty breathing before?\" If Yes, ask: \"When was the last time?\" and \"What happened that time?\"       Denies     6. CARDIAC HISTORY: \"Do you have any history of heart disease?\" (e.g., heart attack, angina, bypass surgery, angioplasty)       Denies     7. LUNG HISTORY: \"Do you have any history of lung disease?\"  (e.g., pulmonary embolus, asthma, emphysema)      Sleep apnea, otherwise denies     8. CAUSE: \"What do you think is causing the breathing problem?\"       Unsure    9. OTHER SYMPTOMS: \"Do you have any other symptoms? (e.g., dizziness, runny nose, cough, chest pain, fever)      Dizziness, chest pain yesterday, swelling a little worse in legs up to her knees    Protocols used: Breathing Difficulty-ADULT-[place]    "

## 2024-09-24 NOTE — Clinical Note
Case was discussed with KRISTINA and the patient's admission status was agreed to be Admission Status: inpatient status to the service of Dr. Powell

## 2024-09-24 NOTE — TELEPHONE ENCOUNTER
Patient called- she is experiencing shortness of breath worse than usual along with some occasional chest pain, dizziness and swelling in her legs. She states this started about 3 days ago.    She said she is SOB even while at rest or laying flat, and very SOB with any movement. She also states she usually has some swelling around her ankles, but now it is going up her shins to her knees. From her explanation, it is pitting up into her shins. She takes Torsemide MWF right now, she thought maybe she should take it everyday. I explained that SOB along with chest pain, dizziness and swelling could be dangerous and advised that she be seen in the ED as soon as possible. She thought the same thing before calling but wanted a second opinion anyway.     Her normal oxygen saturation is 97% but she's been reading at 94% for the last 3 days. She understands this isn't urgent but she is concerned because she's never had any drops before.    She denies chest pain today, but said she had it on and off all day yesterday with dizziness. She was becoming short of breath even just talking with me on the phone. I placed her on the board at [facility], her  will be taking her.

## 2024-09-24 NOTE — ED PROCEDURE NOTE
Procedure  POC Lung US    Date/Time: 9/24/2024 2:15 PM    Performed by: Rip Frausto MD  Authorized by: Rip Frausto MD    Patient location:  ED  Other Assisting Provider: Yes (comment) (Dr. Hebert, Dr. Dillard, MS Sg, MS Liz)    Procedure details:     Exam Type:  Diagnostic    Indications: chest pain and dyspnea      Assessment / Evaluation for:  Pneumothorax, pneumonia and interstitial syndrome    Structures Visualized: pleural line, rib, left hemithorax and right hemithorax      Exam Type: initial exam      Image quality: diagnostic      Image availability:  Images available in PACS  Left Hemithorax Findings:     Left pleura visualized:  Visualized and good quality    Left Hemithorax Findings: normal      Left lung findings: normal interstitium    Right Lung Findings:     Right pleural visualized:  Visualized and good quality    Right hemithorax findings: normal      Right lung findings: normal interstitium                     Rip Frausto MD  09/24/24 1410

## 2024-09-24 NOTE — ASSESSMENT & PLAN NOTE
74-year-old female with diabetes, COPD, CKD who presented to the emergency room for worsening shortness of breath and dizziness over the past few days found to have hemoglobin of 8.7 down from 11 one month ago.  She reports normal bowel movements at home however developed small maroon-colored stool x 2 in the emergency room. VSS.    Patient had colonoscopy 1 month ago 8/30/2024 with removal of few polyps, internal hemorrhoids and a small dieulafoy lesion in the ascending colon s/p clip x 2. EGD earlier this year unremarkable.  Symptoms possibly in the setting of hemorrhoids versus recurrence of dieulafoy lesion or AVM, vs ischemia.    Recommend clear liquid diet for today  Monitor stool output overnight  Monitor hemoglobin and transfuse as needed per primary team  Monitor vital signs  Monitor abdominal exam and consider CT if abdominal pain  If persistent bloody bowel movements overnight with drop in hemoglobin, plan for colonoscopy prep tomorrow  If any development of hemodynamic instability or persistent bloody stools please reach out to GI team sooner

## 2024-09-24 NOTE — ASSESSMENT & PLAN NOTE
Patient presenting with over a month worsening shortness of breath with exertion, dizziness with ambulation  hemoglobin dropped from 11-8.7  Had 2 episodes of maroon-colored stools in the ED, guaiac positive test on JOYCE  GI evaluated the patient in the ED, due to the patient's stability and no further episodes of maroon-colored stools, they are deferring urgent colonoscopy  Patient is hemodynamically stable  On exam, belly is soft, nontender    Plan:  H&H every 8 hours  Clear liquid diet for now  Stat CT abdomen pelvis if worsening abdominal pain or further decompensates  GI following, appreciate recommendations  Monitor hemodynamics for now  Monitor stool output  If worsening bloody stools, prep for colonoscopy

## 2024-09-24 NOTE — CONSULTS
"Consultation - Gastroenterology   Name: Trina Davis 74 y.o. female I MRN: [id number]  Unit/Bed#: ED-24 I Date of Admission: 9/24/2024   Date of Service: 9/24/2024 I Hospital Day: 0   Inpatient consult to gastroenterology  Consult performed by: Josefa Coe PA-C  Consult ordered by: Jay Snow DO        Physician Requesting Evaluation: Laurita Conrad MD   Reason for Evaluation / Principal Problem: anemia, bloody stools    Assessment & Plan  Acute blood loss anemia  74-year-old female with diabetes, COPD, CKD who presented to the emergency room for worsening shortness of breath and dizziness over the past few days found to have hemoglobin of 8.7 down from 11 one month ago.  She reports normal bowel movements at home however developed small maroon-colored stool x 2 in the emergency room. VSS.    Patient had colonoscopy 1 month ago 8/30/2024 with removal of few polyps, internal hemorrhoids and a small dieulafoy lesion in the ascending colon s/p clip x 2. EGD earlier this year unremarkable.  Symptoms possibly in the setting of hemorrhoids versus recurrence of dieulafoy lesion or AVM, vs ischemia.    Recommend clear liquid diet for today  Monitor stool output overnight  Monitor hemoglobin and transfuse as needed per primary team  Monitor vital signs  Monitor abdominal exam and consider CT if abdominal pain  If persistent bloody bowel movements overnight with drop in hemoglobin, plan for colonoscopy prep tomorrow  If any development of hemodynamic instability or persistent bloody stools please reach out to GI team sooner    Bloody stool  See \"acute blood loss anemia\" above.    Gastroenterology service will follow.    History of Present Illness   HPI:  Trina Davis is a 74 y.o. female with history of diabetes, COPD, hypertension, CKD who presents to the emergency room this afternoon due to shortness of breath and dizziness.  She was found to have hemoglobin of 8.7 down from previous 11.4 1-month ago.  While in " the emergency room she had 2 bowel movements.  Bowel movements documented in the chart as formed, maroon and not large-volume.  Blood pressure remained stable.     Patient reports she was in her normal state of health over the last week.  However she does report having some nonbloody diarrhea a few days ago.  No associated nausea or vomiting.  She reports bowel movements were brown at that time.  She reports some abdominal cramping however no severe abdominal pain.  She reports some dizziness at home and shortness of breath was worsened causing her to come to the emergency.  She reports eating some food throughout the day including cookies, peanut butter and pear.    She had an EGD 2/2024 with hyperplastic polyp in the stomach.  Otherwise unremarkable.  She also had a colonoscopy last month 8/30/2024 with removal of few subcentimeter polyps, internal hemorrhoids.  She also had a single small Dieulafoy lesion in the ascending colon status post clip x 2    Review of Systems   All other systems reviewed and are negative.    I have reviewed the patient's PMH, PSH, Social History, Family History, Meds, and Allergies    Objective      Temp:  [97.6 °F (36.4 °C)] 97.6 °F (36.4 °C)  HR:  [52] 52  Resp:  [18] 18  BP: (141)/(60) 141/60  O2 Device: None (Room air)          I/O       None          Lines/Drains/Airways       Active Status       None                  Physical Exam  Vitals and nursing note reviewed.   Constitutional:       General: She is not in acute distress.     Appearance: She is well-developed.   HENT:      Head: Normocephalic and atraumatic.   Eyes:      Conjunctiva/sclera: Conjunctivae normal.   Cardiovascular:      Rate and Rhythm: Normal rate and regular rhythm.      Heart sounds: No murmur heard.  Pulmonary:      Effort: Pulmonary effort is normal. No respiratory distress.      Breath sounds: Normal breath sounds.   Abdominal:      Palpations: Abdomen is soft.      Tenderness: There is no abdominal  tenderness.   Musculoskeletal:         General: No swelling.      Cervical back: Neck supple.   Skin:     General: Skin is warm and dry.      Capillary Refill: Capillary refill takes less than 2 seconds.   Neurological:      Mental Status: She is alert.   Psychiatric:         Mood and Affect: Mood normal.

## 2024-09-24 NOTE — ASSESSMENT & PLAN NOTE
Acute blood loss anemia secondary to lower GI bleed  Troponins normal, BNP normal, no signs of volume overload  See plan below

## 2024-09-24 NOTE — ED ATTENDING ATTESTATION
9/24/2024  IJay DO, saw and evaluated the patient. I have discussed the patient with the resident/non-physician practitioner and agree with the resident's/non-physician practitioner's findings, Plan of Care, and MDM as documented in the resident's/non-physician practitioner's note, except where noted. All available labs and Radiology studies were reviewed.  I was present for key portions of any procedure(s) performed by the resident/non-physician practitioner and I was immediately available to provide assistance.       At this point I agree with the current assessment done in the Emergency Department.  I have conducted an independent evaluation of this patient a history and physical is as follows:     75 yo female coming into the ED for evaluation of generalized weakness, shortness of breath, fatigue.  Symptoms started few days ago.  She has noted abnormal stool, orange in color she states.  She denies any blood thinners.  Complains of intermittent abdominal discomfort.    PE:  The patient is pale and ill appearing, non-toxic, in NAD. Head: normocephalic, atraumatic. HEENT: mucous membranes moist.  Lungs: CTA b/l, no resp distress. Heart: RRR. No M/R/G. Abdomen: NT, ND, no R/R/G. Neuro: CN2-12 intact, GCS 15. Normal strength and sensation, normal speech and gait. Cap refill < 2 sec, skin warm and dry. No rashes or lesions. Rectal exam by resident: maroon colored stool, strongly positive on hemoccult.    Admit for acute lower GI bleed, with symptomatic anemia at 8.7, NIKOS on CKD. No indication for blood transfusion at this time, but will need close monitoring if Hgb continues to decline below 7.          ED Course         Critical Care Time  Procedures

## 2024-09-24 NOTE — ASSESSMENT & PLAN NOTE
Lab Results   Component Value Date    HGBA1C 6.9 (H) 03/05/2024       Recent Labs     09/24/24  1322 09/24/24  1522   POCGLU 78 96       Blood Sugar Average: Last 72 hrs:  (P) 87  Continue home regimen insulin subcutaneous insulin, SSI, clear liquid diet for now

## 2024-09-24 NOTE — ASSESSMENT & PLAN NOTE
Lab Results   Component Value Date    EGFR 12 09/24/2024    EGFR 15 08/09/2024    EGFR 14 07/11/2024    CREATININE 3.44 (H) 09/24/2024    CREATININE 2.91 (H) 08/09/2024    CREATININE 2.96 (H) 07/11/2024   Patient has CKD stage V, has refused dialysis in the past  She states that she has establish care with palliative  Still produces urine

## 2024-09-24 NOTE — ED PROVIDER NOTES
1. GI bleed    2. Anemia    3. SOB (shortness of breath)      ED Disposition       ED Disposition   Admit    Condition   Stable    Date/Time   Tue Sep 24, 2024  3:19 PM    Comment   Case was discussed with KRISTINA and the patient's admission status was agreed to be Admission Status: inpatient status to the service of Dr. Conrad.               Assessment & Plan       Medical Decision Making  Amount and/or Complexity of Data Reviewed  Labs: ordered. Decision-making details documented in ED Course.  Radiology: ordered. Decision-making details documented in ED Course.    Risk  Prescription drug management.  Decision regarding hospitalization.      See ED course for MDM.            ED Course as of 09/24/24 1520   Tue Sep 24, 2024   1432 DDx including but not limited to: anemia, iron deficiency, GI bleed, occult cancer, other malignancy,    1436 JOYCE with chaperone at bedside: positive guaiac   1440 BNP(!): 237  Elevated   1440 Patient had a BM at bedside, formed, maroon, not large volume.   1441 Type and screen added. Patient will need admission for further management of her GI bleed   1455 XR chest 2 views  No acute findings, blunting of L costophrenic angle that is unchanged from previous CXR   1457 POC Glucose: 78  Patient has been having lower glucose, this was after snacks and apple juice.  Will start patient on D5 since she is on insulin and will be n.p.o.   1517 Spoke with GI, does not need high-volume CT scan at this time.   1519 Discussed results with patient and need for admission . Patient voices understanding and agrees with plan. All questions were addressed. No other concerns at this time.    Discussed patient's case with Dr. Conrad (KRISTINA) regarding admission who accepted the patient for further evaluation and management.         Medications   dextrose 5 % and sodium chloride 0.9 % infusion (has no administration in time range)   ipratropium-albuterol (DUO-NEB) 0.5-2.5 mg/3 mL inhalation solution 3 mL (3 mL  Nebulization Given 9/24/24 1304)       History of Present Illness       HPI    (Trina Davis) Trina Davis is a 74 y.o. female with a significant PMHx of COPD and diabetes presents to the ER for shortness of breath onset 3 days ago.  Patient states she was in her normal state of health when she started getting short of breath that is worse with exertion.  Reports dyspnea with while at rest.  Also reports feeling cold all the time.  Denies recent travel, bleeding, fevers. Patient denies chest pain, abdominal pain, or any other complaint at this time.      Allergies include:  Allergies   Allergen Reactions    Ciprofloxacin Hives    Sulfamethoxazole-Trimethoprim Tongue Swelling         Immunizations:  Immunization History   Administered Date(s) Administered    COVID-19 MODERNA VACC 0.5 ML IM 01/29/2021, 02/26/2021, 02/24/2022    COVID-19 Pfizer Vac BIVALENT Julio-sucrose 12 Yr+ IM 10/15/2022    COVID-19 Pfizer mRNA vacc PF julio-sucrose 12 yr and older (Comirnaty) 11/06/2023, 04/23/2024    INFLUENZA 10/17/2018, 09/30/2019, 10/01/2019, 10/15/2022, 08/26/2023    Influenza Split High Dose Preservative Free IM 10/17/2018, 10/01/2019    Influenza, Seasonal Vaccine, Quadrivalent, Adjuvanted, .5e 08/26/2023    Influenza, high dose seasonal 0.7 mL 09/29/2020, 10/12/2021    Pneumococcal Conjugate Vaccine 20-valent (Pcv20), Polysace 07/12/2022, 07/12/2022    Pneumococcal Polysaccharide PPV23 04/01/2021    Tdap 12/26/2018    Zoster Vaccine Recombinant 07/01/2023, 11/30/2023    influenza, trivalent, adjuvanted 09/29/2020, 10/12/2021     Immunizations Reviewed.    Review of Systems   Constitutional:  Negative for activity change, fever and unexpected weight change.   Respiratory:  Negative for cough, chest tightness and shortness of breath.    Cardiovascular:  Negative for chest pain and palpitations.   Gastrointestinal:  Positive for constipation. Negative for abdominal pain, blood in stool, diarrhea, nausea and vomiting.   Endocrine:  Positive for cold intolerance.   Genitourinary:  Negative for dysuria and hematuria.   Skin:  Negative for wound.   Allergic/Immunologic: Negative for immunocompromised state.   Neurological:  Negative for syncope.   All other systems reviewed and are negative.          Objective     ED Triage Vitals [09/24/24 1104]   Temperature Pulse Blood Pressure Respirations SpO2 Patient Position - Orthostatic VS   97.6 °F (36.4 °C) (!) 52 141/60 18 95 % --      Temp Source Heart Rate Source BP Location FiO2 (%) Pain Score    Oral Monitor -- -- --        Physical Exam  Vitals and nursing note reviewed.   Constitutional:       Appearance: She is not toxic-appearing or diaphoretic.   HENT:      Head: Normocephalic and atraumatic.      Right Ear: External ear normal.      Left Ear: External ear normal.      Nose: Nose normal.      Mouth/Throat:      Mouth: Mucous membranes are moist.   Eyes:      General: No scleral icterus.     Extraocular Movements: Extraocular movements intact.      Conjunctiva/sclera: Conjunctivae normal.   Cardiovascular:      Rate and Rhythm: Normal rate and regular rhythm.      Pulses: Normal pulses.           Radial pulses are 2+ on the right side.        Dorsalis pedis pulses are 2+ on the right side and 2+ on the left side.      Heart sounds: Normal heart sounds, S1 normal and S2 normal. No murmur heard.  Pulmonary:      Effort: Pulmonary effort is normal. No respiratory distress.      Breath sounds: Normal breath sounds. No stridor. No decreased breath sounds, wheezing, rhonchi or rales.   Abdominal:      General: Bowel sounds are normal.      Palpations: Abdomen is soft.      Tenderness: There is no abdominal tenderness. There is no guarding.   Genitourinary:     Rectum: Guaiac result positive.      Comments: JOYCE done at bedside, no external hemorrhoids visualized.  Positive maroon-colored stool  Musculoskeletal:         General: Normal range of motion.      Cervical back: Normal range of motion.    Skin:     General: Skin is warm and dry.   Neurological:      General: No focal deficit present.      Mental Status: She is alert and oriented to person, place, and time.   Psychiatric:         Mood and Affect: Mood normal.         Labs Reviewed   CBC AND DIFFERENTIAL - Abnormal       Result Value    WBC 7.64      RBC 3.23 (*)     Hemoglobin 8.7 (*)     Hematocrit 28.0 (*)     MCV 87      MCH 26.9      MCHC 31.1 (*)     RDW 14.2      MPV 11.0      Platelets 289      nRBC 0      Segmented % 64      Immature Grans % 0      Lymphocytes % 24      Monocytes % 7      Eosinophils Relative 4      Basophils Relative 1      Absolute Neutrophils 4.88      Absolute Immature Grans 0.03      Absolute Lymphocytes 1.84      Absolute Monocytes 0.52      Eosinophils Absolute 0.32      Basophils Absolute 0.05     COMPREHENSIVE METABOLIC PANEL - Abnormal    Sodium 136      Potassium 3.8      Chloride 102      CO2 25      ANION GAP 9      BUN 63 (*)     Creatinine 3.44 (*)     Glucose 145 (*)     Calcium 8.8      AST 36      ALT 48      Alkaline Phosphatase 67      Total Protein 6.0 (*)     Albumin 3.8      Total Bilirubin 0.25      eGFR 12      Narrative:     [employer] guidelines for Chronic Kidney Disease (CKD):     Stage 1 with normal or high GFR (GFR > 90 mL/min/1.73 square meters)    Stage 2 Mild CKD (GFR = 60-89 mL/min/1.73 square meters)    Stage 3A Moderate CKD (GFR = 45-59 mL/min/1.73 square meters)    Stage 3B Moderate CKD (GFR = 30-44 mL/min/1.73 square meters)    Stage 4 Severe CKD (GFR = 15-29 mL/min/1.73 square meters)    Stage 5 End Stage CKD (GFR <15 mL/min/1.73 square meters)  Note: GFR calculation is accurate only with a steady state creatinine   B-TYPE NATRIURETIC PEPTIDE (BNP) - Abnormal     (*)    HS TROPONIN I 0HR - Normal    hs TnI 0hr 6     HS TROPONIN I 2HR - Normal    hs TnI 2hr 6      Delta 2hr hsTnI 0     POCT GLUCOSE - Normal    POC Glucose 78     HS TROPONIN I 4HR   TYPE  AND SCREEN    ABO Grouping A      Rh Factor Positive      Antibody Screen Negative      Specimen Expiration Date [id number]     LIGHT BLUE TOP     XR chest 2 views   Final Interpretation by Meng Coto MD (09/24 1432)      No acute cardiopulmonary disease.            Workstation performed: [id number]             Procedures    ED Medication and Procedure Management   Prior to Admission Medications   Prescriptions Last Dose Informant Patient Reported? Taking?   B-D ULTRAFINE III SHORT PEN 31G X 8 MM MISC  Self, Spouse/Significant Other Yes No   Calcium Citrate 250 MG TABS  Self No No   Sig: Take 2 tablets (500 mg total) by mouth in the morning   Patient not taking: Reported on 9/23/2024   Coenzyme Q10 (CoQ10) 100 MG CAPS  Self, Spouse/Significant Other Yes No   Sig: Take 100 mg by mouth in the morning Bed time   Cyanocobalamin (Vitamin B12) 1000 MCG TBCR  Self Yes No   Sig: Take 1,000 mcg by mouth once a week   Icosapent Ethyl (Vascepa) 1 g CAPS   No No   Sig: Take 2 capsules (2 g total) by mouth 2 (two) times a day   Probiotic Product (PROBIOTIC BLEND PO)  Self Yes No   Sig: Take by mouth   Vitamin D, Cholecalciferol, 25 MCG (1000 UT) TABS  Self Yes No   Sig: Take 2,000 Units by mouth in the morning   acetaminophen (TYLENOL) 500 mg tablet  Self Yes No   Sig: Take 1,000 mg by mouth every 6 (six) hours as needed for mild pain   Patient not taking: Reported on 9/23/2024   albuterol (Ventolin HFA) 90 mcg/act inhaler  Self, Spouse/Significant Other No No   Sig: Inhale 2 puffs every 6 (six) hours as needed for wheezing   Patient not taking: Reported on 9/23/2024   amLODIPine (NORVASC) 5 mg tablet  Self No No   Sig: Take 2 tablets (10 mg total) by mouth daily   aspirin (ECOTRIN LOW STRENGTH) 81 mg EC tablet  Self, Spouse/Significant Other No No   Sig: Take 1 tablet (81 mg total) by mouth daily Do not start before October 31, 2023.   escitalopram (LEXAPRO) 10 mg tablet  Self No No   Sig: TAKE 1 TABLET BY MOUTH EVERY DAY    famotidine (PEPCID) 10 mg tablet  Self No No   Sig: Take 1 tablet (10 mg total) by mouth daily Do not start before September 13, 2023.   hydrALAZINE (APRESOLINE) 25 mg tablet  Self No No   Sig: Take 1 tablet (25 mg total) by mouth 2 (two) times a day   insulin aspart (NovoLOG) 100 units/mL injection  Self, Spouse/Significant Other Yes No   Sig: Inject under the skin 3 (three) times a day before meals 5units, 5units, 12units.   insulin detemir (Levemir FlexTouch) 100 Units/mL injection pen  Self, Spouse/Significant Other Yes No   Sig: Inject 20 Units under the skin every evening   levothyroxine 100 mcg tablet  Self No No   Sig: TAKE 1 TABLET BY MOUTH EVERY DAY   methenamine hippurate (HIPREX) 1 g tablet  Self, Spouse/Significant Other No No   Sig: Take 1 tablet (1 g total) by mouth 2 (two) times a day with meals   metoprolol tartrate (LOPRESSOR) 50 mg tablet   No No   Sig: Take 1 tablet (50 mg total) by mouth every 12 (twelve) hours   pantoprazole (PROTONIX) 40 mg tablet   No No   Sig: Take 1 tablet (40 mg total) by mouth 2 (two) times a day   pramipexole (MIRAPEX) 0.25 mg tablet  Self No No   Sig: TAKE 1 TABLET (0.25 MG TOTAL) BY MOUTH DAILY AT BEDTIME   rosuvastatin (CRESTOR) 5 mg tablet  Self, Spouse/Significant Other No No   Sig: Take 1 tablet (5 mg total) by mouth daily   torsemide (DEMADEX) 10 mg tablet  Self No No   Sig: TAKE 10 MG. DAILY EXCEPT MONDAY, WEDNESDAY, FRIDAY 20 MG.      Facility-Administered Medications: None     Patient's Medications   Discharge Prescriptions    No medications on file     No discharge procedures on file.     Luciana Chiang MD  09/24/24 [id number]

## 2024-09-24 NOTE — H&P
H&P - Hospitalist   Name: Trina Davis 74 y.o. female I MRN: [id number]  Unit/Bed#: ED-24 I Date of Admission: 9/24/2024   Date of Service: 9/24/2024 I Hospital Day: 0     Assessment & Plan  Lower GI bleed  Patient presenting with over a month worsening shortness of breath with exertion, dizziness with ambulation  hemoglobin dropped from 11-8.7  Had 2 episodes of maroon-colored stools in the ED, guaiac positive test on JOYCE  GI evaluated the patient in the ED, due to the patient's stability and no further episodes of maroon-colored stools, they are deferring urgent colonoscopy  Patient is hemodynamically stable  On exam, belly is soft, nontender    Plan:  H&H every 8 hours  Clear liquid diet for now  Stat CT abdomen pelvis if worsening abdominal pain or further decompensates  GI following, appreciate recommendations  Monitor hemodynamics for now  Monitor stool output  If worsening bloody stools, prep for colonoscopy   Acute blood loss anemia  Acute blood loss anemia secondary to lower GI bleed  Troponins normal, BNP normal, no signs of volume overload  See plan below  SHAYAN (obstructive sleep apnea)  CPAP at bedtime  Type 2 diabetes mellitus with stage 4 chronic kidney disease, with long-term current use of insulin ([facility])  Lab Results   Component Value Date    HGBA1C 6.9 (H) 03/05/2024       Recent Labs     09/24/24  1322 09/24/24  1522   POCGLU 78 96       Blood Sugar Average: Last 72 hrs:  (P) 87  Continue home regimen insulin subcutaneous insulin, SSI, clear liquid diet for now  Stage 5 chronic kidney disease not on chronic dialysis ([facility])  Lab Results   Component Value Date    EGFR 12 09/24/2024    EGFR 15 08/09/2024    EGFR 14 07/11/2024    CREATININE 3.44 (H) 09/24/2024    CREATININE 2.91 (H) 08/09/2024    CREATININE 2.96 (H) 07/11/2024   Patient has CKD stage V, has refused dialysis in the past  She states that she has establish care with palliative  Still produces urine    VTE Pharmacologic Prophylaxis: VTE  Score: 4 High Risk (Score >/= 5) - Pharmacological DVT Prophylaxis Contraindicated. Sequential Compression Devices Ordered.  Code Status: Level 3 - DNAR and DNI lower GI bleed  Discussion with family: Patient declined call to .     Anticipated Length of Stay: Patient will be admitted on an inpatient basis with an anticipated length of stay of greater than 2 midnights secondary to lower GI bleed.    History of Present Illness   Chief Complaint: Shortness of breath and dizziness occurring for the past week    Trina Davis is a 74 y.o. female with a PMH of diabetes, COPD, CKD, hemorrhoids, Dieulafoy lesion AVM, SHAYAN who presents with several days of worsening shortness of breath, dizziness, fatigue.  She was found to have hemoglobin of 8.7, down from 11 as of 1 month ago.  Patient had a recent colonoscopy August 2024 where she was found to have a single small AVM in the ascending colon with oozing blood, 2 clips were placed.  Also found to have small hemorrhoids and polyps.  Patient states that she does not know whether she has been having bloody stools.  She does state that she has been having watery stools for over a month but she does not check color they are.  She denies any vomiting, abdominal pain, fever or chills, chest pain.  She denies taking any NSAIDs.  No alcohol use.  In the ED, patient had 2 maroon-colored stools and JOYCE at the bedside showed positive test.    Review of Systems   Constitutional:  Negative for chills and fever.   HENT:  Negative for ear pain and sore throat.    Eyes:  Negative for pain and visual disturbance.   Respiratory:  Positive for shortness of breath. Negative for cough.    Cardiovascular:  Negative for chest pain and palpitations.   Gastrointestinal:  Positive for abdominal pain, blood in stool and diarrhea. Negative for vomiting.   Genitourinary:  Negative for dysuria and hematuria.   Musculoskeletal:  Negative for arthralgias and back pain.   Skin:  Negative for color  "change and rash.   Neurological:  Negative for seizures and syncope.   Psychiatric/Behavioral:  Negative for confusion.    All other systems reviewed and are negative.      I have reviewed the patient's PMH, PSH, Social History, Family History, Meds, and Allergies  Historical Information   Past Medical History:   Diagnosis Date    Actinic keratosis     Acute cystitis without hematuria 04/28/2023    Acute cystitis without hematuria 04/28/2023    Acute respiratory failure with hypoxia (HCC)     Acute-on-chronic kidney injury  (HCC)     NIKOS (acute kidney injury) (HCC) 05/08/2020    Allergic     Allergic rhinitis     Ambulatory dysfunction 10/22/2020    AMS (altered mental status) 07/14/2020    Anesthesia     pt reports \"had to use double lumen endo tube for hiatal hernia repair/so surgeon could get to where he needed to work\"    Arthritis     Aspiration into airway     Michael esophagus 1993    Lot of stress with children    Basal cell carcinoma 2007    left cheek     BCC (basal cell carcinoma) 05/27/2021    Left Nasal tip    Breast discharge 06/19/2023    Breast pain 06/19/2023    Cancer (HCC)     squamous cell cancer on forhead    Cancer (HCC)     basal cell on nose     Cholelithiasis     Chronic kidney disease 2000, 2018    Stones, kidney disease stage 4    Chronic pain disorder     bilat feet and joint pain on occas    Colon polyp     Confusion 10/30/2023    Constipation     COPD (chronic obstructive pulmonary disease) (HCC)     COVID-19 08/2021    recovered at home/did receive monoclonal infusion    COVID-19 virus infection 08/16/2021    Dental bridge present     Depression     Diabetes mellitus (HCC)     Type 2    Diabetic neuropathy (HCC)     Difficulty walking 2017    Disease of thyroid gland     Dyspnea     Elevated liver enzymes 04/04/2023    Epigastric abdominal pain with severe diabetic gastroparesis, status post EGD/Botox injection, 5/14 05/17/2021    Facial abrasion, initial encounter 03/22/2023    Fall " 03/22/2023    Family history of thyroid problem     Fatty liver     Fibromyalgia, primary     Fracture of orbital floor, blow-out, right, closed, with routine healing, subsequent encounter 03/22/2023    GERD (gastroesophageal reflux disease)     Heart burn     Hiatal hernia     History of cholecystectomy 10/27/2023    History of colon polyps 07/30/2021    History of kidney stones 09/29/2020    Hx of recurrent kidney stones    History of kidney stones 09/29/2020    Hx of recurrent kidney stones  Formatting of this note might be different from the original. Hx of recurrent kidney stones  Last Assessment & Plan:  Formatting of this note might be different from the original. We will set her up for follow-up in 3 months with a KUB prior.  She knows to call if she has any kidney stone type pain in the meantime.    History of Nissen fundoplication 10/27/2023    History of pneumonia     History of repair of hiatal hernia 05/03/2020    Hyperlipidemia     Hyperplasia, parathyroid (HCC)     Hypertension     Hypertensive urgency 10/27/2023    Hyponatremia 04/26/2023    Hypotension 04/13/2022    Kidney problem     Kidney stone     Left hip pain 10/05/2023    Leukocytosis 07/14/2020    Memory loss Julu2 2020    Motion sickness     Motion sickness     Multiple closed fractures of ribs of right side 03/22/2023    Nasal bones, closed fracture 03/22/2023    Neck pain     Obesity 1978    Obesity (BMI 30.0-34.9)     Other chest pain 09/13/2021    Other fatigue 09/21/2023    Peripheral neuropathy     Pollen allergies     Preoperative clearance 06/27/2019    RLS (restless legs syndrome)     S/P foot surgery 07/20/2022    SCC (squamous cell carcinoma) 05/04/2021    left mid forehead    SCC (squamous cell carcinoma) 2023    chest    Seasonal allergies     Shingles 01 05 23    Sleep apnea     has inspire    Squamous cell skin cancer 2007    left cheek     Stroke (HCC)     Swollen ankles     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, left     great  toe fusion    Urinary incontinence     Urinary tract infection 03/28/2022    Wears glasses      Past Surgical History:   Procedure Laterality Date    ABDOMINAL SURGERY  1997,2015,1972    Nissen x2 1972 tubal ligarion    ARTHROSCOPY KNEE Right     BREAST BIOPSY      stereotactic-benign    BREAST BIOPSY      stereo-benign    BREAST CYST EXCISION Bilateral     benign    BREAST EXCISIONAL BIOPSY      unknown date-benign    BREAST EXCISIONAL BIOPSY      unknown date-benign    BREAST EXCISIONAL BIOPSY      unknown date-benign    BREAST EXCISIONAL BIOPSY      unknown age-benign    CARDIAC CATHETERIZATION      CATARACT EXTRACTION Right     CHOLECYSTECTOMY      COLONOSCOPY      EXAMINATION UNDER ANESTHESIA N/A 06/24/2021    Procedure: EXAM UNDER ANESTHESIA (EUA), DISE;  Surgeon: Gregory Maier MD;  Location: [facility];  Service: ENT    HERNIA REPAIR      hiatal    HIATAL HERNIA REPAIR      KNEE SURGERY Right     Torn maniscus lap surg    LIPOSUCTION      LYMPH NODE BIOPSY      MOHS SURGERY  05/20/2021    left mid forehead-Mickey    MOHS SURGERY Left 05/27/2021    Left nasal tip- mickey     OK LIGATION/BIOPSY TEMPORAL ARTERY Left 01/05/2023    Procedure: BIOPSY ARTERY TEMPORAL;  Surgeon: Alec Dennis DO;  Location: [facility];  Service: Vascular    OK OPEN IMPLANTATION CRANIAL NERVE BOOM & PULSE GEN N/A 11/10/2021    Procedure: INSERTION UPPER AIRWAY STIMULATOR, INSPIRE IMPLANT;  Surgeon: Gregory Maier MD;  Location: [facility];  Service: ENT    OK UNLISTED PROCEDURE FOOT/TOES Right 07/19/2022    Procedure: 1st metatarsal phalangeal joint fusion;  Surgeon: Dede Bonner DPM;  Location: [facility];  Service: Podiatry    REDUCTION MAMMAPLASTY Bilateral 2000    REDUCTION MAMMAPLASTY      SKIN BIOPSY      SKIN CANCER EXCISION  2007    squamous cell carcinoma     SKIN CANCER EXCISION  2007    basal cell carcinoma    SKIN CANCER EXCISION  2023    SCCIS chest    SQUAMOUS CELL CARCINOMA EXCISION      TOE SURGERY Right  2022    Right Great Toe Fusion    TONSILLECTOMY      TUBAL LIGATION      UPPER GASTROINTESTINAL ENDOSCOPY      US GUIDED BREAST BIOPSY RIGHT COMPLETE Right 2022     Social History     Tobacco Use    Smoking status: Former     Current packs/day: 0.00     Average packs/day: 2.0 packs/day for 10.0 years (20.0 ttl pk-yrs)     Types: Cigarettes     Start date: 1966     Quit date: 1976     Years since quittin.7     Passive exposure: Past    Smokeless tobacco: Never    Tobacco comments:     Smoked 2 pack a day   Vaping Use    Vaping status: Never Used   Substance and Sexual Activity    Alcohol use: Yes     Comment: 1 or 2 a year    Drug use: No    Sexual activity: Not Currently     Partners: Male     Birth control/protection: Abstinence, Post-menopausal, Female Sterilization     Comment: defer     E-Cigarette/Vaping    E-Cigarette Use Never User      E-Cigarette/Vaping Substances    Nicotine No     THC No     CBD No     Flavoring No     Other No     Unknown No      Family History   Problem Relation Age of Onset    Heart disease Mother     Depression Mother     Hypertension Mother     COPD Mother     Hearing loss Mother     Anxiety disorder Mother     Heart disease Father     Lung cancer Father 70        Smoker     Cancer Father         brain    Alcohol abuse Father     Dementia Father     Hypertension Father     Thyroid disease Father     COPD Father     Arthritis Father     Brain cancer Father 74    Hypertension Sister     Diabetes Sister     Heart disease Sister     Thyroid disease Sister     Cancer Sister         Lympoma, lung    Lung cancer Sister 79    Anxiety disorder Sister     Migraines Sister     Hypertension Brother     Diabetes Brother     Cancer Brother         Throat    Dementia Brother     Stroke Brother     Hypertension Brother     Heart disease Brother     Diabetes Brother     Hypertension Brother     Allergies Brother     Brain cancer Paternal Aunt         unknown age    No Known  Problems Son     No Known Problems Son     Breast cancer Neg Hx      Social History     Tobacco Use    Smoking status: Former     Current packs/day: 0.00     Average packs/day: 2.0 packs/day for 10.0 years (20.0 ttl pk-yrs)     Types: Cigarettes     Start date: 1966     Quit date: 1976     Years since quittin.7     Passive exposure: Past    Smokeless tobacco: Never    Tobacco comments:     Smoked 2 pack a day   Vaping Use    Vaping status: Never Used   Substance and Sexual Activity    Alcohol use: Yes     Comment: 1 or 2 a year    Drug use: No    Sexual activity: Not Currently     Partners: Male     Birth control/protection: Abstinence, Post-menopausal, Female Sterilization     Comment: defer       Current Facility-Administered Medications:     [START ON 2024] amLODIPine (NORVASC) tablet 10 mg, Daily    [START ON 2024] aspirin (ECOTRIN LOW STRENGTH) EC tablet 81 mg, Daily    [START ON 2024] calcium carbonate-vitamin D 500 mg-5 mcg tablet 1 tablet, Daily With Breakfast    Cholecalciferol (VITAMIN D3) tablet 2,000 Units, Daily    co-enzyme Q-10 capsule 100 mg, Daily    dextrose 5 % and sodium chloride 0.9 % infusion, Continuous, Last Rate: 75 mL/hr (24 1521)    [START ON 2024] escitalopram (LEXAPRO) tablet 10 mg, Daily    [START ON 2024] fish oil capsule 1,000 mg, Daily    hydrALAZINE (APRESOLINE) tablet 25 mg, BID    insulin detemir (LEVEMIR FLEXTOUCH) 100 Units/mL subcutaneous injection pen 20 Units, QPM    insulin lispro (HumALOG/ADMELOG) 100 units/mL subcutaneous injection 5 Units, TID With Meals    [START ON 2024] levothyroxine tablet 100 mcg, Early Morning    methenamine hippurate (HIPREX) tablet 1 g, BID With Meals    metoprolol tartrate (LOPRESSOR) tablet 50 mg, Q12H    pantoprazole (PROTONIX) EC tablet 40 mg, BID    pravastatin (PRAVACHOL) tablet 40 mg, Daily With Dinner    [START ON 2024] torsemide (DEMADEX) tablet 10 mg, Daily  Prior to Admission  Medications   Prescriptions Last Dose Informant Patient Reported? Taking?   B-D ULTRAFINE III SHORT PEN 31G X 8 MM MISC Unknown Self, Spouse/Significant Other Yes No   Calcium Citrate 250 MG TABS 9/24/2024 Self No Yes   Sig: Take 2 tablets (500 mg total) by mouth in the morning   Coenzyme Q10 (CoQ10) 100 MG CAPS 9/23/2024 Self, Spouse/Significant Other Yes Yes   Sig: Take 100 mg by mouth in the morning Bed time   Cyanocobalamin (Vitamin B12) 1000 MCG TBCR 9/24/2024 Self Yes Yes   Sig: Take 1,000 mcg by mouth once a week   Icosapent Ethyl (Vascepa) 1 g CAPS 9/24/2024  No Yes   Sig: Take 2 capsules (2 g total) by mouth 2 (two) times a day   Probiotic Product (PROBIOTIC BLEND PO) 9/24/2024 Self Yes Yes   Sig: Take by mouth   Vitamin D, Cholecalciferol, 25 MCG (1000 UT) TABS 9/24/2024 Self Yes Yes   Sig: Take 2,000 Units by mouth in the morning   acetaminophen (TYLENOL) 500 mg tablet Unknown Self Yes No   Sig: Take 1,000 mg by mouth every 6 (six) hours as needed for mild pain   Patient not taking: Reported on 9/23/2024   albuterol (Ventolin HFA) 90 mcg/act inhaler Unknown Self, Spouse/Significant Other No No   Sig: Inhale 2 puffs every 6 (six) hours as needed for wheezing   Patient not taking: Reported on 9/23/2024   amLODIPine (NORVASC) 5 mg tablet 9/24/2024 Self No Yes   Sig: Take 2 tablets (10 mg total) by mouth daily   aspirin (ECOTRIN LOW STRENGTH) 81 mg EC tablet 9/24/2024 Self, Spouse/Significant Other No Yes   Sig: Take 1 tablet (81 mg total) by mouth daily Do not start before October 31, 2023.   escitalopram (LEXAPRO) 10 mg tablet 9/24/2024 Self No Yes   Sig: TAKE 1 TABLET BY MOUTH EVERY DAY   famotidine (PEPCID) 10 mg tablet  Self No No   Sig: Take 1 tablet (10 mg total) by mouth daily Do not start before September 13, 2023.   hydrALAZINE (APRESOLINE) 25 mg tablet 9/24/2024 Self No Yes   Sig: Take 1 tablet (25 mg total) by mouth 2 (two) times a day   insulin aspart (NovoLOG) 100 units/mL injection 9/24/2024  Self, Spouse/Significant Other Yes Yes   Sig: Inject under the skin 3 (three) times a day before meals 5units, 5units, 12units.   insulin detemir (Levemir FlexTouch) 100 Units/mL injection pen 9/23/2024 Self, Spouse/Significant Other Yes Yes   Sig: Inject 20 Units under the skin every evening   levothyroxine 100 mcg tablet 9/24/2024 Self No Yes   Sig: TAKE 1 TABLET BY MOUTH EVERY DAY   methenamine hippurate (HIPREX) 1 g tablet 9/24/2024 Self, Spouse/Significant Other No Yes   Sig: Take 1 tablet (1 g total) by mouth 2 (two) times a day with meals   metoprolol tartrate (LOPRESSOR) 50 mg tablet 9/24/2024  No Yes   Sig: Take 1 tablet (50 mg total) by mouth every 12 (twelve) hours   pantoprazole (PROTONIX) 40 mg tablet 9/24/2024  No Yes   Sig: Take 1 tablet (40 mg total) by mouth 2 (two) times a day   pramipexole (MIRAPEX) 0.25 mg tablet 9/23/2024 Self No Yes   Sig: TAKE 1 TABLET (0.25 MG TOTAL) BY MOUTH DAILY AT BEDTIME   rosuvastatin (CRESTOR) 5 mg tablet 9/23/2024 Self, Spouse/Significant Other No Yes   Sig: Take 1 tablet (5 mg total) by mouth daily   torsemide (DEMADEX) 10 mg tablet 9/24/2024 Self No Yes   Sig: TAKE 10 MG. DAILY EXCEPT MONDAY, WEDNESDAY, FRIDAY 20 MG.      Facility-Administered Medications: None     Ciprofloxacin and Sulfamethoxazole-trimethoprim  Social History:  Marital Status: /Civil Union   Occupation:   Patient Pre-hospital Living Situation: Home  Patient Pre-hospital Level of Mobility: walks  Patient Pre-hospital Diet Restrictions:     Objective     Vitals:   Blood Pressure: 157/70 (09/24/24 1643)  Pulse: 58 (09/24/24 1643)  Temperature: 97.6 °F (36.4 °C) (09/24/24 1104)  Temp Source: Oral (09/24/24 1104)  Respirations: 18 (09/24/24 1643)  SpO2: 95 % (09/24/24 1643)    Physical Exam  Constitutional:       General: She is not in acute distress.     Appearance: Normal appearance. She is not ill-appearing.   HENT:      Head: Normocephalic and atraumatic.      Mouth/Throat:      Mouth:  Mucous membranes are dry.   Eyes:      Extraocular Movements: Extraocular movements intact.      Pupils: Pupils are equal, round, and reactive to light.   Cardiovascular:      Rate and Rhythm: Normal rate and regular rhythm.      Pulses: Normal pulses.      Heart sounds: Normal heart sounds. No murmur heard.     No friction rub. No gallop.   Pulmonary:      Effort: No respiratory distress.      Breath sounds: Normal breath sounds. No wheezing or rales.   Abdominal:      General: There is no distension.      Palpations: Abdomen is soft.      Tenderness: There is no abdominal tenderness. There is no guarding.   Musculoskeletal:      Right lower leg: No edema.      Left lower leg: No edema.   Skin:     General: Skin is warm.   Neurological:      Mental Status: She is alert and oriented to person, place, and time. Mental status is at baseline.   Psychiatric:         Mood and Affect: Mood normal.         Behavior: Behavior normal.         Lines/Drains:  Lines/Drains/Airways       Active Status       None                        Additional Data:   Lab Results: I have reviewed the following results: CBC/BMP:   .     09/24/24  1110   WBC 7.64   HGB 8.7*   HCT 28.0*      SODIUM 136   K 3.8      CO2 25   BUN 63*   CREATININE 3.44*   GLUC 145*      Results from last 7 days   Lab Units 09/24/24  1110   WBC Thousand/uL 7.64   HEMOGLOBIN g/dL 8.7*   HEMATOCRIT % 28.0*   PLATELETS Thousands/uL 289   SEGS PCT % 64   LYMPHO PCT % 24   MONO PCT % 7   EOS PCT % 4     Results from last 7 days   Lab Units 09/24/24  1110   SODIUM mmol/L 136   POTASSIUM mmol/L 3.8   CHLORIDE mmol/L 102   CO2 mmol/L 25   BUN mg/dL 63*   CREATININE mg/dL 3.44*   ANION GAP mmol/L 9   CALCIUM mg/dL 8.8   ALBUMIN g/dL 3.8   TOTAL BILIRUBIN mg/dL 0.25   ALK PHOS U/L 67   ALT U/L 48   AST U/L 36   GLUCOSE RANDOM mg/dL 145*         Results from last 7 days   Lab Units 09/24/24  1522 09/24/24  1322   POC GLUCOSE mg/dl 96 78     Lab Results   Component  Value Date    HGBA1C 6.9 (H) 03/05/2024    HGBA1C 7.1 (H) 10/28/2023    HGBA1C 7.4 (H) 10/03/2023           Imaging Review: Reviewed radiology reports from this admission including: chest xray.  Other Studies: EKG was reviewed.     Administrative Statements   I have spent a total time of 30 minutes in caring for this patient on the day of the visit/encounter including Diagnostic results and Risks and benefits of tx options.    ** Please Note: This note has been constructed using a voice recognition system. **

## 2024-09-25 ENCOUNTER — APPOINTMENT (INPATIENT)
Dept: CT IMAGING | Facility: HOSPITAL | Age: 74
DRG: 394 | End: 2024-09-25
Payer: MEDICARE

## 2024-09-25 PROBLEM — R10.32 LLQ PAIN: Status: ACTIVE | Noted: 2024-09-25

## 2024-09-25 PROBLEM — R19.7 DIARRHEA: Status: ACTIVE | Noted: 2024-09-25

## 2024-09-25 LAB
ANION GAP SERPL CALCULATED.3IONS-SCNC: 6 MMOL/L (ref 4–13)
BASOPHILS # BLD AUTO: 0 X10E3/UL (ref 0–0.2)
BASOPHILS # BLD AUTO: 0.03 THOUSANDS/ΜL (ref 0–0.1)
BASOPHILS NFR BLD AUTO: 0 %
BASOPHILS NFR BLD AUTO: 0 % (ref 0–1)
BUN SERPL-MCNC: 56 MG/DL (ref 5–25)
CALCIUM SERPL-MCNC: 7.9 MG/DL (ref 8.4–10.2)
CD19 CELLS # BLD: 204 /UL (ref 12–645)
CD19 CELLS NFR BLD: 9.7 % (ref 3.3–25.4)
CD3 CELLS # BLD: 1571 /UL (ref 622–2402)
CD3 CELLS NFR BLD: 74.8 % (ref 57.5–86.2)
CD3+CD4+ CELLS # BLD: 1006 /UL (ref 359–1519)
CD3+CD4+ CELLS NFR BLD: 47.9 % (ref 30.8–58.5)
CD3+CD4+ CELLS/CD3+CD8+ CLL BLD: 1.87 % (ref 0.92–3.72)
CD3+CD8+ CELLS # BLD: 538 /UL (ref 109–897)
CD3+CD8+ CELLS NFR BLD: 25.6 % (ref 12–35.5)
CHLORIDE SERPL-SCNC: 103 MMOL/L (ref 96–108)
CO2 SERPL-SCNC: 27 MMOL/L (ref 21–32)
CREAT SERPL-MCNC: 3.09 MG/DL (ref 0.6–1.3)
CRP SERPL QL: 1.9 MG/L
EOSINOPHIL # BLD AUTO: 0.28 THOUSAND/ΜL (ref 0–0.61)
EOSINOPHIL # BLD AUTO: 0.3 X10E3/UL (ref 0–0.4)
EOSINOPHIL NFR BLD AUTO: 4 %
EOSINOPHIL NFR BLD AUTO: 4 % (ref 0–6)
ERYTHROCYTE [DISTWIDTH] IN BLOOD BY AUTOMATED COUNT: 14 % (ref 11.7–15.4)
ERYTHROCYTE [DISTWIDTH] IN BLOOD BY AUTOMATED COUNT: 14.1 % (ref 11.6–15.1)
EST. AVERAGE GLUCOSE BLD GHB EST-MCNC: 137 MG/DL
GFR SERPL CREATININE-BSD FRML MDRD: 14 ML/MIN/1.73SQ M
GLUCOSE SERPL-MCNC: 110 MG/DL (ref 65–140)
GLUCOSE SERPL-MCNC: 115 MG/DL (ref 65–140)
GLUCOSE SERPL-MCNC: 71 MG/DL (ref 65–140)
GLUCOSE SERPL-MCNC: 83 MG/DL (ref 65–140)
GLUCOSE SERPL-MCNC: 95 MG/DL (ref 65–140)
GLUCOSE SERPL-MCNC: 95 MG/DL (ref 65–140)
HBA1C MFR BLD: 6.4 %
HCT VFR BLD AUTO: 23.8 % (ref 34.8–46.1)
HCT VFR BLD AUTO: 24.7 % (ref 34.8–46.1)
HCT VFR BLD AUTO: 27 % (ref 34.8–46.1)
HCT VFR BLD AUTO: 28.7 % (ref 34–46.6)
HCT VFR BLD AUTO: 29.5 % (ref 34.8–46.1)
HGB BLD-MCNC: 7.4 G/DL (ref 11.5–15.4)
HGB BLD-MCNC: 7.8 G/DL (ref 11.5–15.4)
HGB BLD-MCNC: 8.5 G/DL (ref 11.5–15.4)
HGB BLD-MCNC: 8.9 G/DL (ref 11.1–15.9)
HGB BLD-MCNC: 9.3 G/DL (ref 11.5–15.4)
IMM GRANULOCYTES # BLD AUTO: 0.02 THOUSAND/UL (ref 0–0.2)
IMM GRANULOCYTES # BLD: 0 X10E3/UL (ref 0–0.1)
IMM GRANULOCYTES NFR BLD AUTO: 0 % (ref 0–2)
IMM GRANULOCYTES NFR BLD: 0 %
LYMPHOCYTES # BLD AUTO: 2.02 THOUSANDS/ΜL (ref 0.6–4.47)
LYMPHOCYTES # BLD AUTO: 2.1 X10E3/UL (ref 0.7–3.1)
LYMPHOCYTES NFR BLD AUTO: 27 %
LYMPHOCYTES NFR BLD AUTO: 27 % (ref 14–44)
MCH RBC QN AUTO: 26.5 PG (ref 26.8–34.3)
MCH RBC QN AUTO: 26.8 PG (ref 26.6–33)
MCHC RBC AUTO-ENTMCNC: 31 G/DL (ref 31.5–35.7)
MCHC RBC AUTO-ENTMCNC: 31.1 G/DL (ref 31.4–37.4)
MCV RBC AUTO: 85 FL (ref 82–98)
MCV RBC AUTO: 86 FL (ref 79–97)
MONOCYTES # BLD AUTO: 0.6 THOUSAND/ΜL (ref 0.17–1.22)
MONOCYTES # BLD AUTO: 0.6 X10E3/UL (ref 0.1–0.9)
MONOCYTES NFR BLD AUTO: 8 %
MONOCYTES NFR BLD AUTO: 8 % (ref 4–12)
NEUTROPHILS # BLD AUTO: 4.58 THOUSANDS/ΜL (ref 1.85–7.62)
NEUTROPHILS # BLD AUTO: 4.7 X10E3/UL (ref 1.4–7)
NEUTROPHILS NFR BLD AUTO: 61 %
NEUTS SEG NFR BLD AUTO: 61 % (ref 43–75)
NRBC BLD AUTO-RTO: 0 /100 WBCS
PLATELET # BLD AUTO: 260 THOUSANDS/UL (ref 149–390)
PLATELET # BLD AUTO: 294 X10E3/UL (ref 150–450)
PMV BLD AUTO: 11.1 FL (ref 8.9–12.7)
POTASSIUM SERPL-SCNC: 3.7 MMOL/L (ref 3.5–5.3)
RBC # BLD AUTO: 2.79 MILLION/UL (ref 3.81–5.12)
RBC # BLD AUTO: 3.32 X10E6/UL (ref 3.77–5.28)
SODIUM SERPL-SCNC: 136 MMOL/L (ref 135–147)
WBC # BLD AUTO: 7.53 THOUSAND/UL (ref 4.31–10.16)
WBC # BLD AUTO: 7.7 X10E3/UL (ref 3.4–10.8)

## 2024-09-25 PROCEDURE — 97163 PT EVAL HIGH COMPLEX 45 MIN: CPT

## 2024-09-25 PROCEDURE — 99232 SBSQ HOSP IP/OBS MODERATE 35: CPT | Performed by: STUDENT IN AN ORGANIZED HEALTH CARE EDUCATION/TRAINING PROGRAM

## 2024-09-25 PROCEDURE — 87505 NFCT AGENT DETECTION GI: CPT

## 2024-09-25 PROCEDURE — 87177 OVA AND PARASITES SMEARS: CPT

## 2024-09-25 PROCEDURE — 85018 HEMOGLOBIN: CPT

## 2024-09-25 PROCEDURE — 87209 SMEAR COMPLEX STAIN: CPT

## 2024-09-25 PROCEDURE — 99232 SBSQ HOSP IP/OBS MODERATE 35: CPT | Performed by: INTERNAL MEDICINE

## 2024-09-25 PROCEDURE — 97166 OT EVAL MOD COMPLEX 45 MIN: CPT

## 2024-09-25 PROCEDURE — 85014 HEMATOCRIT: CPT

## 2024-09-25 PROCEDURE — 85025 COMPLETE CBC W/AUTO DIFF WBC: CPT

## 2024-09-25 PROCEDURE — 80048 BASIC METABOLIC PNL TOTAL CA: CPT

## 2024-09-25 PROCEDURE — 82948 REAGENT STRIP/BLOOD GLUCOSE: CPT

## 2024-09-25 PROCEDURE — 74176 CT ABD & PELVIS W/O CONTRAST: CPT

## 2024-09-25 PROCEDURE — 86140 C-REACTIVE PROTEIN: CPT | Performed by: PHYSICIAN ASSISTANT

## 2024-09-25 RX ORDER — ACETAMINOPHEN 325 MG/1
650 TABLET ORAL EVERY 6 HOURS PRN
Status: DISCONTINUED | OUTPATIENT
Start: 2024-09-25 | End: 2024-09-28 | Stop reason: HOSPADM

## 2024-09-25 RX ORDER — SUCRALFATE 1 G/1
1 TABLET ORAL
Status: DISCONTINUED | OUTPATIENT
Start: 2024-09-25 | End: 2024-09-28 | Stop reason: HOSPADM

## 2024-09-25 RX ORDER — GABAPENTIN 100 MG/1
100 CAPSULE ORAL 3 TIMES DAILY PRN
Status: DISCONTINUED | OUTPATIENT
Start: 2024-09-25 | End: 2024-09-28 | Stop reason: HOSPADM

## 2024-09-25 RX ORDER — PRAMIPEXOLE DIHYDROCHLORIDE 0.25 MG/1
0.25 TABLET ORAL ONCE
Status: COMPLETED | OUTPATIENT
Start: 2024-09-25 | End: 2024-09-25

## 2024-09-25 RX ADMIN — Medication 2000 UNITS: at 08:18

## 2024-09-25 RX ADMIN — INSULIN DETEMIR 20 UNITS: 100 INJECTION, SOLUTION SUBCUTANEOUS at 17:47

## 2024-09-25 RX ADMIN — SODIUM CHLORIDE 75 ML/HR: 0.9 INJECTION, SOLUTION INTRAVENOUS at 09:43

## 2024-09-25 RX ADMIN — ACETAMINOPHEN 325MG 650 MG: 325 TABLET ORAL at 10:03

## 2024-09-25 RX ADMIN — GABAPENTIN 100 MG: 100 CAPSULE ORAL at 17:21

## 2024-09-25 RX ADMIN — ESCITALOPRAM OXALATE 10 MG: 10 TABLET ORAL at 08:18

## 2024-09-25 RX ADMIN — PRAMIPEXOLE DIHYDROCHLORIDE 0.25 MG: 0.25 TABLET ORAL at 03:38

## 2024-09-25 RX ADMIN — SUCRALFATE 1 G: 1 TABLET ORAL at 16:25

## 2024-09-25 RX ADMIN — OMEGA-3 FATTY ACIDS CAP 1000 MG 1000 MG: 1000 CAP at 08:18

## 2024-09-25 RX ADMIN — HYDRALAZINE HYDROCHLORIDE 25 MG: 25 TABLET ORAL at 21:28

## 2024-09-25 RX ADMIN — METOPROLOL TARTRATE 50 MG: 50 TABLET, FILM COATED ORAL at 16:25

## 2024-09-25 RX ADMIN — HYDRALAZINE HYDROCHLORIDE 25 MG: 25 TABLET ORAL at 08:18

## 2024-09-25 RX ADMIN — AMLODIPINE BESYLATE 10 MG: 10 TABLET ORAL at 08:18

## 2024-09-25 RX ADMIN — PANTOPRAZOLE SODIUM 40 MG: 40 TABLET, DELAYED RELEASE ORAL at 21:28

## 2024-09-25 RX ADMIN — PANTOPRAZOLE SODIUM 40 MG: 40 TABLET, DELAYED RELEASE ORAL at 08:18

## 2024-09-25 RX ADMIN — LEVOTHYROXINE SODIUM 100 MCG: 100 TABLET ORAL at 05:50

## 2024-09-25 RX ADMIN — TORSEMIDE 20 MG: 20 TABLET ORAL at 08:17

## 2024-09-25 RX ADMIN — METOPROLOL TARTRATE 50 MG: 50 TABLET, FILM COATED ORAL at 03:41

## 2024-09-25 RX ADMIN — PRAVASTATIN SODIUM 40 MG: 40 TABLET ORAL at 16:25

## 2024-09-25 RX ADMIN — ACETAMINOPHEN 325MG 650 MG: 325 TABLET ORAL at 03:37

## 2024-09-25 RX ADMIN — Medication 100 MG: at 08:18

## 2024-09-25 RX ADMIN — ASPIRIN 81 MG: 81 TABLET, COATED ORAL at 08:18

## 2024-09-25 RX ADMIN — SUCRALFATE 1 G: 1 TABLET ORAL at 21:28

## 2024-09-25 RX ADMIN — Medication 1 TABLET: at 08:20

## 2024-09-25 NOTE — PHYSICAL THERAPY NOTE
"PHYSICAL THERAPY EVALUATION  NAME:  Trina Davis  DATE: 09/25/24    AGE:   74 y.o.  Mrn:   [id number]  Principal problem: Principal Problem:    Lower GI bleed  Active Problems:    SHAYAN (obstructive sleep apnea)    Type 2 diabetes mellitus with stage 4 chronic kidney disease, with long-term current use of insulin (HCC)    Acute blood loss anemia    Stage 5 chronic kidney disease not on chronic dialysis (HCC)    Diarrhea    LLQ pain      Vitals:    09/24/24 2201 09/25/24 0340 09/25/24 0340 09/25/24 0703   BP: 129/65 146/64 146/64 146/66   BP Location: Right arm      Pulse: (!) 53 55 55 (!) 53   Resp: 18   18   Temp: 98.1 °F (36.7 °C)   98.4 °F (36.9 °C)   TempSrc: Oral      SpO2: 93%  96% 95%       Length Of Stay: 1  Performed at least 2 patient identifiers during session: Name and Epic photo  PHYSICAL THERAPY EVALUATION :    09/25/24 1102   PT Last Visit   PT Visit Date 09/25/24   Note Type   Note type Evaluation   Pain Assessment   Pain Assessment Tool 0-10   Pain Score No Pain  (headache no longer there;  is TTP @ [facility])   Patient's Stated Pain Goal No pain   Hospital Pain Intervention(s) Repositioned;Ambulation/increased activity;Emotional support   Restrictions/Precautions   Other Precautions Bed Alarm;Chair Alarm;Cognitive;Contact/isolation  (Not formal fall risk, recent pain)   Home Living   Type of Home House  ([facility])   Home Layout Two level;Performs ADLs on one level;Able to live on main level with bedroom/bathroom  (No LEYLA)   Home Equipment Cane;Walker   Additional Comments Pt confirmed that she  (furniture) \"surfs\" at home and does not use DME inside. Pt typically uses walker outside vs arm and arm w/ spouse.   Prior Function   Level of [place] Independent with ADLs;Independent with functional mobility;Needs assistance with IADLS   Lives With Spouse;Son   Receives Help From Family   IADLs Independent with meal prep;Family/Friend/Other provides transportation;Family/Friend/Other provides " "medication management   Falls in the last 6 months   (Pt had distant falls in the past, but \"is careful\" prior to this admission)   General   Family/Caregiver Present No   Cognition   Overall Cognitive Status WFL   Arousal/Participation Alert   Attention Within functional limits   Memory Decreased recall of precautions   Following Commands Follows one step commands with increased time or repetition  (w/ MMT)   Subjective   Subjective Pt pleasant and cooperative, recently awakened from a nap, reports still a little tired, but HA is gone.  Reports having intermittent lightheadedness with upright change of position even prior to this admisison   RUE Assessment   RUE Assessment   (ROM WFL, tested to 3)   LUE Assessment   LUE Assessment   (ROM WFL, tested to 3)   Strength RLE   R Hip Flexion 4/5   R Knee Extension 4/5   R Ankle Dorsiflexion 4/5   Strength LLE   L Hip Flexion 4/5   L Knee Extension 4/5   L Ankle Dorsiflexion 4/5   Light Touch   RLE Light Touch Grossly intact   LLE Light Touch Grossly intact   Bed Mobility   Supine to Sit 4  Minimal assistance   Additional items Assist x 1;Increased time required;Verbal cues;LE management;Bedrails  (w/ momentum)   Sit to Supine 5  Supervision   Additional items Assist x 1;Increased time required;Bedrails   Transfers   Sit to Stand 5  Supervision   Additional items Assist x 1;Increased time required;Verbal cues  (wide RAJANI)   Stand to Sit 5  Supervision   Additional items Assist x 1;Increased time required;Verbal cues   Ambulation/Elevation   Gait pattern Excessively slow;Inconsistent juanita  (reaching for UE support vs high guard positioning @ UEs)   Gait Assistance 5  Supervision  (CLOSE S)   Additional items Assist x 1   Assistive Device None  (declined)   Distance 8'x2 with pt declining further gait trials due to fatigue   Stair Management Assistance Not tested   Balance   Static Sitting Good   Static Standing Fair -   Ambulatory Fair -   Endurance Deficit   Endurance " Deficit Yes   Endurance Deficit Description limited amb distance and standing tolerance, even compared to baseline. Pt reports intermittent lightheadedness with upright positioning (less today as compared to yesterday). Pt's HR in the 50's throughout--with pt reporting being bradycardic at times   Activity Tolerance   Activity Tolerance Patient limited by fatigue   Medical Staff Made Aware information reviewed from OT   Nurse Made Aware spoke to RN before and after session.     Assessment:   Pt is a 74 y.o. female seen for PT evaluation s/p admit to [facility] on 9/24/2024 w/ Lower GI bleed. W/u in progress.  Order placed for PT.      Prior to admission: Pt lived with her spouse in a 1 floor set up of a two-story townhome, no stairs to enter.  Patient did not use any DME prior to this admission in the home setting, but did use a rolling walker versus spouses arm support in the community.  Upon evaluation: Pt needed min assist for bed mobility, but supervision for transfers and short distance ambulation to a commode within the room.  Patient does display gait deviations of either for furniture support versus high guard posture..      Pt's clinical presentation is currently unstable/unpredictable given the functional mobility deficits above, especially (but not limited to) gait deviations and decreased functional mobility tolerance, and combined with medical complications including bradycardia, abnormal renal lab values, and abnormal H&H.  Pt IS NOT at her mobility baseline.  She is at risk for falls based on hx of falls and impaired balance.       During this admission, pt would benefit from continued skilled inpatient PT in the acute care setting in order to address deficits as defined above to maximize function and mobility.      Recommendations:    From a PT standpoint, recommend next several sessions focus on continued mobility training with cane versus no device in the room, rolling walker for improving  activity tolerance in the halls.  Pt Declined further f/u with back assessment/treatment including therex while here.    Prognosis Fair   Problem List Decreased endurance;Impaired balance;Decreased mobility  (gait deviations)   Barriers to Discharge Decreased caregiver support   Goals   Patient Goals to go home, less lightheadedness   STG Expiration Date 10/05/24   Short Term Goal #1 Goals: Pt will: Perform rolling  and supine<>sit bed mobility tasks with modified I to prepare for transfers and reposition in bed. Perform transfers with modified I to promote proper hand placement and approach. Perform ambulation with either unilateral device vs no DME for up to 50' with  Supervision to increase Indep in home environment. Perform at least 150' w/ BUE support of DME to amb community distances. Perform one curb step w/min A and DME to promote more indep w/ community barriers   PT Treatment Day 0   Plan   Treatment/Interventions Functional transfer training;LE strengthening/ROM;Endurance training;Patient/family training;Equipment eval/education;Bed mobility;Gait training;Therapeutic exercise;Elevations;Spoke to nursing   PT Frequency 3-5x/wk   Discharge Recommendation   Rehab Resource Intensity Level, PT III (Minimum Resource Intensity)  (vs no post acute rehab (Pt recently finished w/ OPPT for her back in Aug 2024))   Equipment Recommended   (cane vs no device in room, walker in halls.)   Additional Comments 2 Personal factors affecting pt at time of IE include: steps to enter environment, advanced age, past experience, and inability to navigate community distances.   AM-PAC Basic Mobility Inpatient   Turning in Flat Bed Without Bedrails 4   Lying on Back to Sitting on Edge of Flat Bed Without Bedrails 3   Moving Bed to Chair 3   Standing Up From Chair Using Arms 3   Walk in Room 3   Climb 3-5 Stairs With Railing 1   Basic Mobility Inpatient Raw Score 17   Basic Mobility Standardized Score 39.67   [facility]  "Level Of Mobility   JH-HLM Goal 5: Stand one or more mins   JH-HLM Achieved 6: Walk 10 steps or more   End of Consult   Patient Position at End of Consult All needs within reach;Supine  (nursing reports pt does not require bed alarm)   (Please find full objective findings from PT assessment regarding body systems outlined above).     The patient's -[facility] Basic Mobility Inpatient Short Form Raw Score is 17. A Raw score of greater than 16 suggests the patient may benefit from discharge to home, which DOES coincide with CURRENT above PT recommendations.     However please refer to therapist recommendation for discharge planning given other factors that may influence destination.     Adapted from Adam KELIN, Brisa J, Jeremy J, Ilya J. Association of -[facility] “6-Clicks” Basic Mobility and Daily Activity Scores With Discharge Destination. Physical Therapy, 2021;101:1-9. DOI: [email or web address]    Portions of the record may have been created with voice recognition software.  Occasional wrong word or \"sound a like\" substitutions may have occurred due to the inherent limitations of voice recognition software.  Read the chart carefully and recognize, using context, where substitutions have occurred    "

## 2024-09-25 NOTE — ASSESSMENT & PLAN NOTE
Patient reports having chronic left lower quadrant pain.  She denies any abdominal pain however does have some tenderness in the left side with palpation.  In the setting of diarrhea and blood in stool, consider ischemia versus diverticulitis.  Will obtain CT scan for further evaluation

## 2024-09-25 NOTE — ASSESSMENT & PLAN NOTE
Lab Results   Component Value Date    EGFR 14 09/25/2024    EGFR 12 09/24/2024    EGFR 15 08/09/2024    CREATININE 3.09 (H) 09/25/2024    CREATININE 3.44 (H) 09/24/2024    CREATININE 2.91 (H) 08/09/2024   Patient has CKD stage V, has refused dialysis in the past  She states that she has establish care with palliative  Still produces urine  Baseline creatinine 2.9-3.2, patient is at baseline    Plan:  Follow-up a.m. BMP

## 2024-09-25 NOTE — ASSESSMENT & PLAN NOTE
Patient has history of GERD, is on Protonix 40 mg twice daily at home.  Endorses some right upper quadrant pain after drinking some juice.    Plan:  ContinueProtonix 40 mg twice daily  Carafate 1 g 4 times daily

## 2024-09-25 NOTE — Clinical Note
From 11/09/23: Two-story home, able to live on main level with bed and bath.  No steps to enter.      lives w/ spouse. ambulates w/ cane or rollator. receives assistance w/ ADL as needed. requires assistance w/ IADLs. 6 falls in last 6 months.   Pt presents with complaints of elevated blood pressure, headache. Dx: headache, hypertensive urgency, DM, cognitive impairment, CKD, and UTI. order placed for PT eval and tx. pt presents w/ comorbidities of ambulatory dysfunction, arthritis, aspiration, CKD, chronic pain disorder, DM, hyperlipidemia, HTN, obesity, left great toe fusion, UTI, and orbital wall fx and personal factors of advanced age, decreased cognition, positive fall history, depression, and inability to perform IADLs. pt presents w/ weakness, decreased endurance, impaired cognition, impaired balance, gait deviations, decreased safety awareness, and fall risk. these impairments are evident in findings from physical examination (weakness), mobility assessment (need for standby assist w/ all phases of mobility when usually mobilizing independently, tolerance to only 140 feet of ambulation, and need for cueing for mobility technique), and Barthel Index: 60/100 and Comfortable Gait Speed: 0.41 m/s (less than 1.0 m/s indicates need for intervention to address falls risk). pt needed input for task focus and mobility technique/safety. pt is at risk for falls due to physical and safety awareness deficits. pt's clinical presentation is unstable/unpredictable (evident in need for assist w/ all phases of mobility when usually mobilizing independently, tolerance to only 120 feet of ambulation, and need for input for task focus and mobility technique). pt needs inpatient PT tx to improve mobility deficits and progress mobility training as appropriate. Gerontology consult would benefit pt to address cognition and aging related issues.

## 2024-09-25 NOTE — ASSESSMENT & PLAN NOTE
74-year-old female with diabetes, COPD, CKD who presented to the emergency room for worsening shortness of breath and dizziness over the past few days found to have hemoglobin of 8.7 down from 11 one month ago.  She reports normal bowel movements at home however developed small maroon-colored stool x 2 in the emergency room. VSS.    Colonoscopy 8/30/2024 with removal of few polyps, internal hemorrhoids and a small dieulafoy lesion in the ascending colon s/p clip x 2. EGD earlier this year unremarkable.  Hgb 11.4 -> 8.7 -> 7.4  Seems most likely ischemic colitis, r/o infection, appears less likely diverticular, AVM or dieulafoy    Bowel movements most recently have become brown without blood  F/u stool studies  CT as noted below due to some left-sided abdominal pain  Recheck hemoglobin  Check CRP  If hemoglobin remains stable and blood in the stool continues to be resolved, would hold off on colonoscopy  Continue clear liquid diet for now  Monitor hemoglobin and transfuse as needed per primary team  Monitor and document stools

## 2024-09-25 NOTE — HOSPITAL COURSE
"   Is a 74-year-old female with blood loss anemia somatic with shortness of breath found to have hemoglobin 8.7 down from baseline of 11.     Patient colonoscopy 1 month ago and had some polyps removed was noted a small dieulafoy lesion in the ascending colon s/p 2 x clips.      The patient was passing large amounts of blood noted to be a bright red blood per rectum during admission the patient stated that the passage of the blood was painless. The patient was not having any dizziness, lightheadedness, shortness of breath, chest pain or pressure but did feel more tired   Decision was made to provide patient bowel prep on 9/26 morning and plan for colonoscopy later in the afternoon. I had seen the patient in the morning hours but at the time of this documentation in the later afternoon, colonoscopy has now been completed. The patient was noted to have a single small Dieulafoy lesion in the ascending colon about 70 cm from the anal verge which was noted to be \"spurting blood\" and required placement of 2 clips with hemostasis achieved. 5 mL of carbon black was also injected distal to the lesion. A pedunculated polyp was also noted which was removed by hot snare with complete en bloc removal and 1 clip was successfully placed in this location. Patient was also noted to have another polyp which was also removed by hot snare. Internal hemorrhoids were also noted but the hemorrhoids and cells were nonbleeding. Of note, CT of the abdomen/pelvis was grossly unremarkable except for some atelectasis.     Hemoglobin remained stable, and patient was deemed stable for discharge    "

## 2024-09-25 NOTE — ASSESSMENT & PLAN NOTE
Patient presenting with over a month worsening shortness of breath with exertion, dizziness with ambulation.  Patient's hemoglobin was 11 as of August, was 8.7 in the ED. Had 2 episodes of maroon-colored stools in the ED, guaiac positive test on JOYCE. GI evaluated the patient in the ED, due to the patient's stability and no further episodes of maroon-colored stools, they are deferring urgent colonoscopy.   9/25 Patient having left-sided abdominal discomfort, will obtain CT to evaluate for ischemic changes  Reassuringly patient is currently hemodynamically stable with most recent bowel movements appearing nonbloody.  No current signs of overt bleeding.  9/25, hemoglobin 9.3, appears stable for now    Plan:  H&H every 8 hours  Clear liquid diet for now  Monitor hemodynamics for now  Monitor stool output  GI following, appreciate recommendations  follow-up CT scan  Transfuse if hemoglobin less than 7, patient is consented for blood products

## 2024-09-25 NOTE — ASSESSMENT & PLAN NOTE
Lab Results   Component Value Date    HGBA1C 6.4 (H) 09/24/2024       Recent Labs     09/24/24  1522 09/24/24  1853 09/24/24  2107 09/25/24  0303   POCGLU 96 292* 204* 71       Blood Sugar Average: Last 72 hrs:  (P) 148.2  Continue home regimen insulin subcutaneous insulin, SSI, clear liquid diet for now

## 2024-09-25 NOTE — PLAN OF CARE
Problem: PHYSICAL THERAPY ADULT  Goal: Performs mobility at highest level of function for planned discharge setting.  See evaluation for individualized goals.  Description: Treatment/Interventions: Functional transfer training, LE strengthening/ROM, Endurance training, Patient/family training, Equipment eval/education, Bed mobility, Gait training, Therapeutic exercise, Elevations, Spoke to nursing  Equipment Recommended:  (cane vs no device in room, walker in halls.)       See flowsheet documentation for full assessment, interventions and recommendations.  9/25/2024 1229 by Ina Mccord PT  Note: Prognosis: Fair  Problem List: Decreased endurance, Impaired balance, Decreased mobility (gait deviations)  Assessment: Pt is a 74 y.o. female seen for PT evaluation s/p admit to [facility] beds [facility] on 9/24/2024 w/ Lower GI bleed. W/u in progress.  Order placed for PT.      Prior to admission: [place] [facility] [facility] to [place].  Patient did not use any DME prior to this admission in the home setting, but did use a rolling walker versus spouses arm support in the community.  Upon evaluation: Pt needed min assist for bed mobility, but supervision for transfers and short distance ambulation to a commode within the room.  Patient does display gait deviations of either for furniture support versus high guard posture..      Pt's clinical presentation is currently unstable/unpredictable given the functional mobility deficits above, especially (but not limited to) gait deviations and decreased functional mobility tolerance, and combined with medical complications including bradycardia, abnormal renal lab values, and abnormal H&H.  Pt IS NOT at her mobility baseline.  She is at risk for falls based on hx of falls and impaired balance.       During this admission, pt would benefit from continued skilled inpatient PT in the acute care setting in order to address deficits as  defined above to maximize function and mobility.      Recommendations:     From a PT standpoint, recommend next several sessions focus on continued mobility training with cane versus no device in the room, rolling walker for improving activity tolerance in the halls.   Pt Declined further f/u with back assessment/treatment including therex while here.  Barriers to Discharge: Decreased caregiver support     Rehab Resource Intensity Level, PT: III (Minimum Resource Intensity) (vs no post acute rehab (Pt recently finished w/ OPPT for her back in Aug 2024))    See flowsheet documentation for full assessment.

## 2024-09-25 NOTE — PROGRESS NOTES
Progress Note - Hospitalist   Name: Trina Davis 74 y.o. female I MRN: [id number]  Unit/Bed#: S -01 I Date of Admission: 9/24/2024   Date of Service: 9/25/2024 I Hospital Day: 1  \  Assessment & Plan  Lower GI bleed  Patient presenting with over a month worsening shortness of breath with exertion, dizziness with ambulation.  Patient's hemoglobin was 11 as of August, was 8.7 in the ED. Had 2 episodes of maroon-colored stools in the ED, guaiac positive test on [facility]. GI evaluated the patient in the ED, due to the patient's stability and no further episodes of maroon-colored stools, they are deferring urgent colonoscopy.   9/25 Patient having left-sided abdominal discomfort, will obtain CT to evaluate for ischemic changes  Reassuringly patient is currently hemodynamically stable with most recent bowel movements appearing nonbloody.  No current signs of overt bleeding.  9/25, hemoglobin 9.3, appears stable for now    Plan:  H&H every 8 hours  Clear liquid diet for now  Monitor hemodynamics for now  Monitor stool output  GI following, appreciate recommendations  follow-up CT scan  Transfuse if hemoglobin less than 7, patient is consented for blood products    Diarrhea  Patient reports having diarrhea for the past month, her hematochezia is recent.  Plan to rule out infectious etiology.  CRP within normal limits 1.9    Plan:  Follow-up enteric pathogen panel, stool and ova parasite panel.  Acute blood loss anemia  Acute blood loss anemia secondary to lower GI bleed  Troponins normal, BNP normal, no signs of volume overload.  See plan above  SHAYAN (obstructive sleep apnea)  CPAP at bedtime  Type 2 diabetes mellitus with stage 4 chronic kidney disease, with long-term current use of insulin ([facility])  Lab Results   Component Value Date    HGBA1C 6.4 (H) 09/24/2024       Recent Labs     09/24/24  1522 09/24/24  1853 09/24/24  2107 09/25/24  0303   POCGLU 96 292* 204* 71       Blood Sugar Average: Last 72 hrs:  (P)  148.2  Continue home regimen insulin subcutaneous insulin, SSI, clear liquid diet for now  Stage 5 chronic kidney disease not on chronic dialysis (HCC)  Lab Results   Component Value Date    EGFR 14 09/25/2024    EGFR 12 09/24/2024    EGFR 15 08/09/2024    CREATININE 3.09 (H) 09/25/2024    CREATININE 3.44 (H) 09/24/2024    CREATININE 2.91 (H) 08/09/2024   Patient has CKD stage V, has refused dialysis in the past  She states that she has establish care with palliative  Still produces urine  Baseline creatinine 2.9-3.2, patient is at baseline    Plan:  Follow-up a.m. BMP  Gastroesophageal reflux disease without esophagitis  Patient has history of GERD, is on Protonix 40 mg twice daily at home.  Endorses some right upper quadrant pain after drinking some juice.    Plan:  ContinueProtonix 40 mg twice daily  Carafate 1 g 4 times daily  LLQ pain  Patient complaining of some left lower quadrant pain, will obtain CT scan for evaluation of ischemic changes.    VTE Pharmacologic Prophylaxis: VTE Score: 4 holding in setting of acute blood loss anemia, SCDs ordered    Mobility:   Basic Mobility Inpatient Raw Score: 24  JH-HLM Goal: 8: Walk 250 feet or more  JH-HLM Achieved: 7: Walk 25 feet or more  JH-HLM Goal achieved. Continue to encourage appropriate mobility.    Patient Centered Rounds: I performed bedside rounds with nursing staff today.   Discussions with Specialists or Other Care Team Provider: GI    Education and Discussions with Family / Patient: Patient declined call to .     Current Length of Stay: 1 day(s)  Current Patient Status: Inpatient   Certification Statement: The patient will continue to require additional inpatient hospital stay due to GI bleed  Discharge Plan: Anticipate discharge in 48-72 hrs to home.    Code Status: Level 3 - DNAR and DNI    Subjective   No acute events overnight. Denied chest pain, dyspnea, nausea, vomiting, fevers, and chills.  Patient stated that her bowel movements were  brown.  Patient was having some left-sided abdominal discomfort.  Consented patient for blood given that her hemoglobin dropped to 7.4 from 8.7.    Objective     Vitals:   Temp (24hrs), Av.8 °F (36.6 °C), Min:97.6 °F (36.4 °C), Max:98.1 °F (36.7 °C)    Temp:  [97.6 °F (36.4 °C)-98.1 °F (36.7 °C)] 98.1 °F (36.7 °C)  HR:  [52-65] 55  Resp:  [15-18] 18  BP: (129-157)/(58-70) 146/64  SpO2:  [93 %-96 %] 96 %  There is no height or weight on file to calculate BMI.     Input and Output Summary (last 24 hours):     Intake/Output Summary (Last 24 hours) at 2024 0630  Last data filed at 2024 2144  Gross per 24 hour   Intake --   Output 100 ml   Net -100 ml       Physical Exam  Vitals reviewed.   Constitutional:       General: She is not in acute distress.     Appearance: Normal appearance. She is not ill-appearing.   HENT:      Head: Normocephalic and atraumatic.      Mouth/Throat:      Mouth: Mucous membranes are moist.   Eyes:      Pupils: Pupils are equal, round, and reactive to light.   Cardiovascular:      Rate and Rhythm: Normal rate and regular rhythm.      Heart sounds: No murmur heard.     No friction rub.   Pulmonary:      Effort: Pulmonary effort is normal. No respiratory distress.      Breath sounds: Normal breath sounds. No wheezing.   Abdominal:      General: Abdomen is flat. There is no distension.      Palpations: Abdomen is soft.      Tenderness: There is abdominal tenderness.      Comments: Mild abdominal tenderness on the left upper and lower quadrants   Skin:     General: Skin is warm and dry.   Neurological:      General: No focal deficit present.      Mental Status: She is alert and oriented to person, place, and time.          Lines/Drains:  Lines/Drains/Airways       Active Status       None                            Lab Results: I have reviewed the following results:    Results from last 7 days   Lab Units 24  0506   WBC Thousand/uL 7.53   HEMOGLOBIN g/dL 7.4*   HEMATOCRIT % 23.8*    PLATELETS Thousands/uL 260   SEGS PCT % 61   LYMPHO PCT % 27   MONO PCT % 8   EOS PCT % 4     Results from last 7 days   Lab Units 09/25/24  0506 09/24/24  1110   SODIUM mmol/L 136 136   POTASSIUM mmol/L 3.7 3.8   CHLORIDE mmol/L 103 102   CO2 mmol/L 27 25   BUN mg/dL 56* 63*   CREATININE mg/dL 3.09* 3.44*   ANION GAP mmol/L 6 9   CALCIUM mg/dL 7.9* 8.8   ALBUMIN g/dL  --  3.8   TOTAL BILIRUBIN mg/dL  --  0.25   ALK PHOS U/L  --  67   ALT U/L  --  48   AST U/L  --  36   GLUCOSE RANDOM mg/dL 95 145*         Results from last 7 days   Lab Units 09/25/24  0303 09/24/24  2107 09/24/24  1853 09/24/24  1522 09/24/24  1322   POC GLUCOSE mg/dl 71 204* 292* 96 78     Results from last 7 days   Lab Units 09/24/24  1110   HEMOGLOBIN A1C % 6.4*           Recent Cultures (last 7 days):         Imaging Review: No pertinent imaging studies reviewed.  Other Studies: EKG was reviewed.     Last 24 Hours Medication List:     Current Facility-Administered Medications:     acetaminophen (TYLENOL) tablet 650 mg, Q6H PRN    amLODIPine (NORVASC) tablet 10 mg, Daily    aspirin (ECOTRIN LOW STRENGTH) EC tablet 81 mg, Daily    calcium carbonate-vitamin D 500 mg-5 mcg tablet 1 tablet, Daily With Breakfast    Cholecalciferol (VITAMIN D3) tablet 2,000 Units, Daily    co-enzyme Q-10 capsule 100 mg, Daily    escitalopram (LEXAPRO) tablet 10 mg, Daily    fish oil capsule 1,000 mg, Daily    hydrALAZINE (APRESOLINE) tablet 25 mg, BID    insulin detemir (LEVEMIR) subcutaneous injection 20 Units, QPM    insulin lispro (HumALOG/ADMELOG) 100 units/mL subcutaneous injection 1-5 Units, 4x Daily (AC & HS) **AND** Fingerstick Glucose (POCT), 4x Daily AC and at bedtime    insulin lispro (HumALOG/ADMELOG) 100 units/mL subcutaneous injection 5 Units, TID With Meals    levothyroxine tablet 100 mcg, Early Morning    metoprolol tartrate (LOPRESSOR) tablet 50 mg, Q12H    pantoprazole (PROTONIX) EC tablet 40 mg, BID    pravastatin (PRAVACHOL) tablet 40 mg, Daily  With Dinner    sodium chloride 0.9 % infusion, Continuous, Last Rate: 75 mL/hr (09/24/24 1924)    [START ON 9/26/2024] torsemide (DEMADEX) tablet 10 mg, Once per day on Sunday Tuesday Thursday Saturday **AND** torsemide (DEMADEX) tablet 20 mg, Once per day on Monday Wednesday Friday    Administrative Statements   Today, Patient Was Seen By: Paris Noble MD      **Please Note: This note may have been constructed using a voice recognition system.**

## 2024-09-25 NOTE — ASSESSMENT & PLAN NOTE
Patient reports having diarrhea for the past month, her hematochezia is recent.  Plan to rule out infectious etiology.  CRP within normal limits 1.9    Plan:  Follow-up enteric pathogen panel, stool and ova parasite panel.

## 2024-09-25 NOTE — ASSESSMENT & PLAN NOTE
Acute blood loss anemia secondary to lower GI bleed  Troponins normal, BNP normal, no signs of volume overload.  See plan above

## 2024-09-25 NOTE — OCCUPATIONAL THERAPY NOTE
Occupational Therapy Evaluation       09/25/24 0824   OT Last Visit   OT Visit Date 09/25/24   Note Type   Note type Evaluation   Pain Assessment   Pain Assessment Tool 0-10   Pain Score No Pain   Home Living   Type of Home Other (Comment)  (townhouse)   Home Layout Two level;Performs ADLs on one level;Able to live on main level with bedroom/bathroom   Bathroom Shower/Tub Walk-in shower   Bathroom Toilet Raised   Bathroom Equipment Built-in shower seat;Hand-held shower   Home Equipment Cane;Other (Comment)  (rollator)   Additional Comments pt reports I with mob in the home without AD, uses rollator outdoors   Prior Function   Level of [place] Independent with ADLs;Independent with functional mobility;Needs assistance with IADLS   Lives With Spouse;Son   Receives Help From Family   IADLs Independent with meal prep;Family/Friend/Other provides transportation;Family/Friend/Other provides medication management   General   Additional Pertinent History presents with several days of worsening shortness of breath, dizziness, fatigue.  She was found to have hemoglobin of 8.7, down from 11 as of 1 month ago.   Family/Caregiver Present Yes  (spouse)   Subjective   Subjective Pt agreeable to OT evaluation   ADL   Eating Assistance 6  Modified independent   Grooming Assistance 6  Modified Independent   UB Bathing Assistance 5  Supervision/Setup   LB Bathing Assistance 5  Supervision/Setup   UB Dressing Assistance 5  Supervision/Setup   LB Dressing Assistance 5  Supervision/Setup   Toileting Assistance  5  Supervision/Setup   Bed Mobility   Supine to Sit 5  Supervision   Sit to Supine Unable to assess   Additional Comments transferred to bedside chair   Transfers   Sit to Stand 5  Supervision   Additional items Verbal cues   Stand to Sit 5  Supervision   Additional items Verbal cues   Additional Comments cues for safe hand placement   Functional Mobility   Functional Mobility 5  Supervision   Additional Comments engaged in  fxl mobility household distances using RW and supervision, +time   Additional items Rolling walker   Balance   Static Sitting Good   Dynamic Sitting Fair   Static Standing Fair   Activity Tolerance   Activity Tolerance Patient limited by fatigue   Nurse Made Aware RN Carlie CASTILLO Assessment   RUE Assessment   (ROM WFL, MMT grossly 3+/5)   LUE Assessment   LUE Assessment   (ROM WFL, MMT grossly 3+/5)   Cognition   Arousal/Participation Alert;Cooperative   Attention Within functional limits   Orientation Level Oriented X4   Following Commands Follows one step commands without difficulty   Assessment   Limitation Decreased ADL status;Decreased endurance;Decreased self-care trans;Decreased high-level ADLs;Decreased UE strength  (decreased balance and mobility)   Assessment Patient evaluated by Occupational Therapy.  Patient admitted with Lower GI bleed.  The patients occupational profile, medical and therapy history includes a extensive additional review of physical, cognitive, or psychosocial history related to current functional performance.  Comorbidities affecting functional mobility and ADLS include: diabetes, COPD, CKD, hemorrhoids, Dieulafoy lesion AVM, SHAYAN.  Prior to admission, patient was independent with functional mobility without AD in the home and using rollator outdoors, independent with ADLS, and requiring assist for IADLS.  The evaluation identifies the following performance deficits: weakness, impaired balance, decreased endurance, decreased ADLS, decreased IADLS, decreased activity tolerance, decreased safety awareness, and decreased strength, that result in activity limitations and/or participation restrictions. This evaluation requires clinical decision making of moderate complexity, because the patient may present with comorbidities that affect occupational performance and required minimal or moderate modification of tasks or assistance with the consideration of several treatment options. The  patient's raw score on the AM-PAC Daily Activity Inpatient Short Form is 20. A raw score of greater than or equal to 19 suggests the patient may benefit from discharge to home. Please refer to the recommendation of the Occupational Therapist for safe discharge planning.  Patient will benefit from skilled Occupational Therapy services to address above deficits and facilitate a safe return to prior level of function.   Goals   Patient Goals to get better and go home   STG Time Frame   (10 days)   Short Term Goal  see goals below   Plan   Treatment Interventions ADL retraining;Functional transfer training;UE strengthening/ROM;Endurance training;Patient/family training;Equipment evaluation/education;Activityengagement;Compensatory technique education   Goal Expiration Date 10/05/24   OT Frequency 2-3x/wk   Discharge Recommendation   Rehab Resource Intensity Level, OT III (Minimum Resource Intensity)   AM-PAC Daily Activity Inpatient   Lower Body Dressing 3   Bathing 3   Toileting 3   Upper Body Dressing 3   Grooming 4   Eating 4   Daily Activity Raw Score 20   Daily Activity Standardized Score (Calc for Raw Score >=11) 42.03   AM-[facility] Applied Cognition Inpatient   Following a Speech/Presentation 3   Understanding Ordinary Conversation 4   Taking Medications 3   Remembering Where Things Are Placed or Put Away 3   Remembering List of 4-5 Errands 2   Taking Care of Complicated Tasks 2   Applied Cognition Raw Score 17   Applied Cognition Standardized Score 36.52     GOALS:    Pt will achieve the following goals within 10 days.     -Patient will perform grooming tasks standing at sink with mod I in order to increase (I) with ADLs.     -Patient will be independent with UB dressing using AE and AD as needed in order to increase (I) with ADLs.     -Patient will be independent with UB bathing using AE and AD as needed in order to increase (I) with ADLs.    -Patient will be Mod I with LB dressing with use of AE and AD as needed in  order to increase (I) with ADLs.     -Patient will be Mod I with LB bathing with use of AE and AD as needed in order to increase (I) with ADLs.     -Patient will complete toileting w/ mod I w/ G hygiene/thoroughness using DME PRN in order to reduce caregiver burden.     -Patient will demonstrate mod I with bed mobility for ability to manage own comfort and initiate OOB tasks.     -Patient will perform functional transfers with mod I to/from all surfaces using DME as needed in order to increase (I) with functional tasks.    -Patient will improve functional mobility during ADL/IADL/leisure tasks to mod I using DME as needed w/ G balance/safety.    -Patient will demonstrate standing for 7-10 min in order to increase active participation in functional activities.    -Patient will increase OOB/sitting tolerance to 2-4 hours per day to increase activity tolerance and engagement in self-care and meaningful activities.    -Patient will engage in ongoing cognitive assessment in order to assist with safe discharge planning/recommendations.       Melissa Montesinos OTR/L   [place] License # [id number]  [place] License # [id number]

## 2024-09-25 NOTE — ASSESSMENT & PLAN NOTE
Patient complaining of some left lower quadrant pain, will obtain CT scan for evaluation of ischemic changes.

## 2024-09-25 NOTE — PROGRESS NOTES
"Progress Note - Gastroenterology   Name: Trina Davis 74 y.o. female I MRN: [id number]  Unit/Bed#: S -01 I Date of Admission: 9/24/2024   Date of Service: 9/25/2024 I Hospital Day: 1     Assessment & Plan  Acute blood loss anemia  74-year-old female with diabetes, COPD, CKD who presented to the emergency room for worsening shortness of breath and dizziness over the past few days found to have hemoglobin of 8.7 down from 11 one month ago.  She reports normal bowel movements at home however developed small maroon-colored stool x 2 in the emergency room. VSS.    Colonoscopy 8/30/2024 with removal of few polyps, internal hemorrhoids and a small dieulafoy lesion in the ascending colon s/p clip x 2. EGD earlier this year unremarkable.  Hgb 11.4 -> 8.7 -> 7.4  Seems most likely ischemic colitis, r/o infection, appears less likely diverticular, AVM or dieulafoy    Bowel movements most recently have become brown without blood  F/u stool studies  CT as noted below due to some left-sided abdominal pain  Recheck hemoglobin  Check CRP  If hemoglobin remains stable and blood in the stool continues to be resolved, would hold off on colonoscopy  Continue clear liquid diet for now  Monitor hemoglobin and transfuse as needed per primary team  Monitor and document stools    Lower GI bleed  See \"acute blood loss anemia\" above.  LLQ pain  Patient reports having chronic left lower quadrant pain.  She denies any abdominal pain however does have some tenderness in the left side with palpation.  In the setting of diarrhea and blood in stool, consider ischemia versus diverticulitis.  Will obtain CT scan for further evaluation    History of Present Illness   24 Hour Events : Hemoglobin downtrending slightly again this morning from 8.5-7.4.  Vital signs remained stable.  Few bowel movements documented overnight, initially documented as loose and red with most recent bowel movement documented as loose and brown  Subjective : Patient " reports overnight having 2 bowel movements which were  watery and bloody after drinking orange juice.  She has not reports she had to more bowel movements which were watery brown without any blood.  She denies having any abdominal pain.  No nausea or vomiting.  She reports having a slight headache right now.    Objective      Temp:  [97.6 °F (36.4 °C)-98.4 °F (36.9 °C)] 98.4 °F (36.9 °C)  HR:  [52-65] 53  Resp:  [15-18] 18  BP: (129-157)/(58-70) 146/66  O2 Device: None (Room air)          I/O         09/23 0701  09/24 0700 09/24 0701  09/25 0700 09/25 0701  09/26 0700    Urine  100     Stool  0     Total Output  100     Net  -100            Unmeasured Stool Occurrence  2 x           Lines/Drains/Airways       Active Status       None                  Physical Exam  Vitals and nursing note reviewed.   Constitutional:       General: She is not in acute distress.     Appearance: She is well-developed.   HENT:      Head: Normocephalic and atraumatic.   Eyes:      Conjunctiva/sclera: Conjunctivae normal.   Cardiovascular:      Rate and Rhythm: Normal rate and regular rhythm.      Heart sounds: No murmur heard.  Pulmonary:      Effort: Pulmonary effort is normal. No respiratory distress.      Breath sounds: Normal breath sounds.   Abdominal:      General: Abdomen is flat. Bowel sounds are normal. There is no distension.      Palpations: Abdomen is soft.      Tenderness: There is abdominal tenderness (LLQ). There is no guarding or rebound.   Musculoskeletal:         General: No swelling.      Cervical back: Neck supple.   Skin:     General: Skin is warm and dry.      Capillary Refill: Capillary refill takes less than 2 seconds.   Neurological:      Mental Status: She is alert.   Psychiatric:         Mood and Affect: Mood normal.

## 2024-09-26 ENCOUNTER — ANESTHESIA (INPATIENT)
Dept: GASTROENTEROLOGY | Facility: HOSPITAL | Age: 74
DRG: 394 | End: 2024-09-26
Payer: MEDICARE

## 2024-09-26 ENCOUNTER — APPOINTMENT (INPATIENT)
Dept: GASTROENTEROLOGY | Facility: HOSPITAL | Age: 74
DRG: 394 | End: 2024-09-26
Payer: MEDICARE

## 2024-09-26 ENCOUNTER — ANESTHESIA EVENT (INPATIENT)
Dept: GASTROENTEROLOGY | Facility: HOSPITAL | Age: 74
DRG: 394 | End: 2024-09-26
Payer: MEDICARE

## 2024-09-26 PROBLEM — R10.32 LLQ PAIN: Status: RESOLVED | Noted: 2024-09-25 | Resolved: 2024-09-26

## 2024-09-26 LAB
ABO GROUP BLD: NORMAL
ALBUMIN SERPL ELPH-MCNC: 3.4 G/DL (ref 3.2–5.1)
ALBUMIN SERPL ELPH-MCNC: 60.7 % (ref 48–70)
ALPHA1 GLOB SERPL ELPH-MCNC: 0.29 G/DL (ref 0.15–0.47)
ALPHA1 GLOB SERPL ELPH-MCNC: 5.1 % (ref 1.8–7)
ALPHA2 GLOB SERPL ELPH-MCNC: 0.76 G/DL (ref 0.42–1.04)
ALPHA2 GLOB SERPL ELPH-MCNC: 13.5 % (ref 5.9–14.9)
ANION GAP SERPL CALCULATED.3IONS-SCNC: 7 MMOL/L (ref 4–13)
ATRIAL RATE: 49 BPM
BASOPHILS # BLD AUTO: 0.03 THOUSANDS/ΜL (ref 0–0.1)
BASOPHILS NFR BLD AUTO: 0 % (ref 0–1)
BETA GLOB ABNORMAL SERPL ELPH-MCNC: 0.46 G/DL (ref 0.31–0.57)
BETA1 GLOB SERPL ELPH-MCNC: 8.3 % (ref 4.7–7.7)
BETA2 GLOB SERPL ELPH-MCNC: 5.2 % (ref 3.1–7.9)
BETA2+GAMMA GLOB SERPL ELPH-MCNC: 0.29 G/DL (ref 0.2–0.58)
BLD GP AB SCN SERPL QL: NEGATIVE
BUN SERPL-MCNC: 47 MG/DL (ref 5–25)
C COLI+JEJUNI TUF STL QL NAA+PROBE: NEGATIVE
C TETANI IGG SER IA-ACNC: 1.1 IU/ML
CALCIUM SERPL-MCNC: 8.4 MG/DL (ref 8.4–10.2)
CHLORIDE SERPL-SCNC: 104 MMOL/L (ref 96–108)
CO2 SERPL-SCNC: 28 MMOL/L (ref 21–32)
CREAT SERPL-MCNC: 2.95 MG/DL (ref 0.6–1.3)
EC STX1+STX2 GENES STL QL NAA+PROBE: NEGATIVE
EOSINOPHIL # BLD AUTO: 0.26 THOUSAND/ΜL (ref 0–0.61)
EOSINOPHIL NFR BLD AUTO: 4 % (ref 0–6)
ERYTHROCYTE [DISTWIDTH] IN BLOOD BY AUTOMATED COUNT: 14.2 % (ref 11.6–15.1)
GAMMA GLOB ABNORMAL SERPL ELPH-MCNC: 0.4 G/DL (ref 0.4–1.66)
GAMMA GLOB SERPL ELPH-MCNC: 7.2 % (ref 6.9–22.3)
GFR SERPL CREATININE-BSD FRML MDRD: 14 ML/MIN/1.73SQ M
GLUCOSE SERPL-MCNC: 106 MG/DL (ref 65–140)
GLUCOSE SERPL-MCNC: 157 MG/DL (ref 65–140)
GLUCOSE SERPL-MCNC: 78 MG/DL (ref 65–140)
GLUCOSE SERPL-MCNC: 79 MG/DL (ref 65–140)
GLUCOSE SERPL-MCNC: 88 MG/DL (ref 65–140)
HCT VFR BLD AUTO: 24.5 % (ref 34.8–46.1)
HCT VFR BLD AUTO: 24.8 % (ref 34.8–46.1)
HCT VFR BLD AUTO: 26.2 % (ref 34.8–46.1)
HGB BLD-MCNC: 7.5 G/DL (ref 11.5–15.4)
HGB BLD-MCNC: 7.7 G/DL (ref 11.5–15.4)
HGB BLD-MCNC: 8.2 G/DL (ref 11.5–15.4)
IGG/ALB SER: 1.54 {RATIO} (ref 1.1–1.8)
IMM GRANULOCYTES # BLD AUTO: 0.02 THOUSAND/UL (ref 0–0.2)
IMM GRANULOCYTES NFR BLD AUTO: 0 % (ref 0–2)
LYMPHOCYTES # BLD AUTO: 2.18 THOUSANDS/ΜL (ref 0.6–4.47)
LYMPHOCYTES NFR BLD AUTO: 31 % (ref 14–44)
MCH RBC QN AUTO: 26.1 PG (ref 26.8–34.3)
MCHC RBC AUTO-ENTMCNC: 30.6 G/DL (ref 31.4–37.4)
MCV RBC AUTO: 85 FL (ref 82–98)
MONOCYTES # BLD AUTO: 0.59 THOUSAND/ΜL (ref 0.17–1.22)
MONOCYTES NFR BLD AUTO: 8 % (ref 4–12)
NEUTROPHILS # BLD AUTO: 4.03 THOUSANDS/ΜL (ref 1.85–7.62)
NEUTS SEG NFR BLD AUTO: 57 % (ref 43–75)
NRBC BLD AUTO-RTO: 0 /100 WBCS
P AXIS: 53 DEGREES
PLATELET # BLD AUTO: 262 THOUSANDS/UL (ref 149–390)
PMV BLD AUTO: 10.3 FL (ref 8.9–12.7)
POTASSIUM SERPL-SCNC: 3.5 MMOL/L (ref 3.5–5.3)
PR INTERVAL: 240 MS
PROT PATTERN SERPL ELPH-IMP: ABNORMAL
PROT SERPL-MCNC: 5.6 G/DL (ref 6.4–8.2)
QRS AXIS: 3 DEGREES
QRSD INTERVAL: 90 MS
QT INTERVAL: 486 MS
QTC INTERVAL: 439 MS
RBC # BLD AUTO: 2.87 MILLION/UL (ref 3.81–5.12)
RH BLD: POSITIVE
SALMONELLA SP SPAO STL QL NAA+PROBE: NEGATIVE
SHIGELLA SP+EIEC IPAH STL QL NAA+PROBE: NEGATIVE
SODIUM SERPL-SCNC: 139 MMOL/L (ref 135–147)
SPECIMEN EXPIRATION DATE: NORMAL
T WAVE AXIS: -7 DEGREES
VENTRICULAR RATE: 49 BPM
WBC # BLD AUTO: 7.11 THOUSAND/UL (ref 4.31–10.16)

## 2024-09-26 PROCEDURE — 82948 REAGENT STRIP/BLOOD GLUCOSE: CPT

## 2024-09-26 PROCEDURE — 84165 PROTEIN E-PHORESIS SERUM: CPT | Performed by: PATHOLOGY

## 2024-09-26 PROCEDURE — 85014 HEMATOCRIT: CPT

## 2024-09-26 PROCEDURE — 85018 HEMOGLOBIN: CPT

## 2024-09-26 PROCEDURE — 45381 COLONOSCOPY SUBMUCOUS NJX: CPT | Performed by: STUDENT IN AN ORGANIZED HEALTH CARE EDUCATION/TRAINING PROGRAM

## 2024-09-26 PROCEDURE — 0W3P8ZZ CONTROL BLEEDING IN GASTROINTESTINAL TRACT, VIA NATURAL OR ARTIFICIAL OPENING ENDOSCOPIC: ICD-10-PCS | Performed by: STUDENT IN AN ORGANIZED HEALTH CARE EDUCATION/TRAINING PROGRAM

## 2024-09-26 PROCEDURE — 45382 COLONOSCOPY W/CONTROL BLEED: CPT | Performed by: STUDENT IN AN ORGANIZED HEALTH CARE EDUCATION/TRAINING PROGRAM

## 2024-09-26 PROCEDURE — 99232 SBSQ HOSP IP/OBS MODERATE 35: CPT | Performed by: INTERNAL MEDICINE

## 2024-09-26 PROCEDURE — 0DBM8ZZ EXCISION OF DESCENDING COLON, VIA NATURAL OR ARTIFICIAL OPENING ENDOSCOPIC: ICD-10-PCS | Performed by: STUDENT IN AN ORGANIZED HEALTH CARE EDUCATION/TRAINING PROGRAM

## 2024-09-26 PROCEDURE — 80048 BASIC METABOLIC PNL TOTAL CA: CPT

## 2024-09-26 PROCEDURE — 85025 COMPLETE CBC W/AUTO DIFF WBC: CPT

## 2024-09-26 PROCEDURE — 0DBK8ZZ EXCISION OF ASCENDING COLON, VIA NATURAL OR ARTIFICIAL OPENING ENDOSCOPIC: ICD-10-PCS | Performed by: STUDENT IN AN ORGANIZED HEALTH CARE EDUCATION/TRAINING PROGRAM

## 2024-09-26 PROCEDURE — 88305 TISSUE EXAM BY PATHOLOGIST: CPT | Performed by: PATHOLOGY

## 2024-09-26 PROCEDURE — 86900 BLOOD TYPING SEROLOGIC ABO: CPT

## 2024-09-26 PROCEDURE — 45385 COLONOSCOPY W/LESION REMOVAL: CPT | Performed by: STUDENT IN AN ORGANIZED HEALTH CARE EDUCATION/TRAINING PROGRAM

## 2024-09-26 PROCEDURE — 86901 BLOOD TYPING SEROLOGIC RH(D): CPT

## 2024-09-26 PROCEDURE — 93010 ELECTROCARDIOGRAM REPORT: CPT | Performed by: INTERNAL MEDICINE

## 2024-09-26 PROCEDURE — 86850 RBC ANTIBODY SCREEN: CPT

## 2024-09-26 RX ORDER — PROPOFOL 10 MG/ML
INJECTION, EMULSION INTRAVENOUS AS NEEDED
Status: DISCONTINUED | OUTPATIENT
Start: 2024-09-26 | End: 2024-09-26

## 2024-09-26 RX ORDER — SODIUM CHLORIDE, SODIUM LACTATE, POTASSIUM CHLORIDE, CALCIUM CHLORIDE 600; 310; 30; 20 MG/100ML; MG/100ML; MG/100ML; MG/100ML
INJECTION, SOLUTION INTRAVENOUS CONTINUOUS PRN
Status: DISCONTINUED | OUTPATIENT
Start: 2024-09-26 | End: 2024-09-26

## 2024-09-26 RX ADMIN — PROPOFOL 30 MG: 10 INJECTION, EMULSION INTRAVENOUS at 13:20

## 2024-09-26 RX ADMIN — SUCRALFATE 1 G: 1 TABLET ORAL at 11:10

## 2024-09-26 RX ADMIN — PANTOPRAZOLE SODIUM 40 MG: 40 TABLET, DELAYED RELEASE ORAL at 20:46

## 2024-09-26 RX ADMIN — SUCRALFATE 1 G: 1 TABLET ORAL at 06:11

## 2024-09-26 RX ADMIN — PROPOFOL 20 MG: 10 INJECTION, EMULSION INTRAVENOUS at 13:32

## 2024-09-26 RX ADMIN — Medication 100 MG: at 08:48

## 2024-09-26 RX ADMIN — PROPOFOL 30 MG: 10 INJECTION, EMULSION INTRAVENOUS at 13:39

## 2024-09-26 RX ADMIN — GABAPENTIN 100 MG: 100 CAPSULE ORAL at 06:13

## 2024-09-26 RX ADMIN — LEVOTHYROXINE SODIUM 100 MCG: 100 TABLET ORAL at 06:11

## 2024-09-26 RX ADMIN — Medication 2000 UNITS: at 08:48

## 2024-09-26 RX ADMIN — PROPOFOL 50 MG: 10 INJECTION, EMULSION INTRAVENOUS at 13:17

## 2024-09-26 RX ADMIN — PROPOFOL 30 MG: 10 INJECTION, EMULSION INTRAVENOUS at 13:57

## 2024-09-26 RX ADMIN — ACETAMINOPHEN 325MG 650 MG: 325 TABLET ORAL at 08:49

## 2024-09-26 RX ADMIN — HYDRALAZINE HYDROCHLORIDE 25 MG: 25 TABLET ORAL at 08:50

## 2024-09-26 RX ADMIN — ACETAMINOPHEN 325MG 650 MG: 325 TABLET ORAL at 20:47

## 2024-09-26 RX ADMIN — PROPOFOL 30 MG: 10 INJECTION, EMULSION INTRAVENOUS at 13:46

## 2024-09-26 RX ADMIN — PRAVASTATIN SODIUM 40 MG: 40 TABLET ORAL at 16:18

## 2024-09-26 RX ADMIN — SODIUM CHLORIDE, SODIUM LACTATE, POTASSIUM CHLORIDE, AND CALCIUM CHLORIDE: .6; .31; .03; .02 INJECTION, SOLUTION INTRAVENOUS at 13:10

## 2024-09-26 RX ADMIN — PROPOFOL 20 MG: 10 INJECTION, EMULSION INTRAVENOUS at 13:48

## 2024-09-26 RX ADMIN — POLYETHYLENE GLYCOL 3350, SODIUM SULFATE ANHYDROUS, SODIUM BICARBONATE, SODIUM CHLORIDE, POTASSIUM CHLORIDE 4000 ML: 236; 22.74; 6.74; 5.86; 2.97 POWDER, FOR SOLUTION ORAL at 08:50

## 2024-09-26 RX ADMIN — ESCITALOPRAM OXALATE 10 MG: 10 TABLET ORAL at 08:49

## 2024-09-26 RX ADMIN — GABAPENTIN 100 MG: 100 CAPSULE ORAL at 15:11

## 2024-09-26 RX ADMIN — INSULIN LISPRO 1 UNITS: 100 INJECTION, SOLUTION INTRAVENOUS; SUBCUTANEOUS at 22:11

## 2024-09-26 RX ADMIN — Medication 1 TABLET: at 08:48

## 2024-09-26 RX ADMIN — PANTOPRAZOLE SODIUM 40 MG: 40 TABLET, DELAYED RELEASE ORAL at 08:50

## 2024-09-26 RX ADMIN — PROPOFOL 10 MG: 10 INJECTION, EMULSION INTRAVENOUS at 13:28

## 2024-09-26 RX ADMIN — SUCRALFATE 1 G: 1 TABLET ORAL at 21:21

## 2024-09-26 RX ADMIN — PROPOFOL 40 MG: 10 INJECTION, EMULSION INTRAVENOUS at 13:24

## 2024-09-26 RX ADMIN — PROPOFOL 50 MG: 10 INJECTION, EMULSION INTRAVENOUS at 13:18

## 2024-09-26 RX ADMIN — SUCRALFATE 1 G: 1 TABLET ORAL at 16:18

## 2024-09-26 RX ADMIN — GABAPENTIN 100 MG: 100 CAPSULE ORAL at 22:59

## 2024-09-26 RX ADMIN — PROPOFOL 30 MG: 10 INJECTION, EMULSION INTRAVENOUS at 13:43

## 2024-09-26 RX ADMIN — HYDRALAZINE HYDROCHLORIDE 25 MG: 25 TABLET ORAL at 20:46

## 2024-09-26 RX ADMIN — METOPROLOL TARTRATE 50 MG: 50 TABLET, FILM COATED ORAL at 16:18

## 2024-09-26 RX ADMIN — AMLODIPINE BESYLATE 10 MG: 10 TABLET ORAL at 08:50

## 2024-09-26 RX ADMIN — ASPIRIN 81 MG: 81 TABLET, COATED ORAL at 08:50

## 2024-09-26 RX ADMIN — OMEGA-3 FATTY ACIDS CAP 1000 MG 1000 MG: 1000 CAP at 08:48

## 2024-09-26 RX ADMIN — METOPROLOL TARTRATE 50 MG: 50 TABLET, FILM COATED ORAL at 04:40

## 2024-09-26 RX ADMIN — INSULIN DETEMIR 10 UNITS: 100 INJECTION, SOLUTION SUBCUTANEOUS at 17:42

## 2024-09-26 RX ADMIN — PROPOFOL 40 MG: 10 INJECTION, EMULSION INTRAVENOUS at 13:53

## 2024-09-26 NOTE — ANESTHESIA POSTPROCEDURE EVALUATION
Post-Op Assessment Note    CV Status:  Stable  Pain Score: 0    Pain management: adequate       Mental Status:  Alert and awake   Hydration Status:  Euvolemic and stable   PONV Controlled:  Controlled   Airway Patency:  Patent and adequate     Post Op Vitals Reviewed: Yes    No anethesia notable event occurred.    Staff: CRNA               BP   105/54   Temp      Pulse 55   Resp 18   SpO2 95% 2L NC

## 2024-09-26 NOTE — ASSESSMENT & PLAN NOTE
Patient presenting with over a month worsening shortness of breath with exertion, dizziness with ambulation.  Patient's hemoglobin was 11 as of August, was 8.7 in the ED. Had 2 episodes of maroon-colored stools in the ED, guaiac positive test on [facility]. GI evaluated the patient in the ED, due to the patient's stability and no further episodes of maroon-colored stools, they are deferring urgent colonoscopy.   9/25 CT abdomen and pelvis without contrast, no acute findings.  16mm right basilar opacity, likely atelectasis however radiology recommending CT chest follow-up in 2 months.    9/26 patient was having bloody bowel movements again.  Notified that patient was actively outputting precious red blood per rectum, approximately 100 cc.  Due to active bleeding, patient was scoped with findings including bleeding Dula Jorden in the ascending colon, same as the one seen on her previous colonoscopy.  Lesion was clipped x 2.    Plan:  Clear liquid diet for now  Monitor hemodynamics for now  Monitor stool output  GI to follow up in the morning, appreciate recommendations  Transfuse if hemoglobin less than 7, patient is consented for blood products, type and screen is active  Follow-up a.m. CBC

## 2024-09-26 NOTE — ASSESSMENT & PLAN NOTE
Lab Results   Component Value Date    EGFR 14 09/26/2024    EGFR 14 09/25/2024    EGFR 12 09/24/2024    CREATININE 2.95 (H) 09/26/2024    CREATININE 3.09 (H) 09/25/2024    CREATININE 3.44 (H) 09/24/2024   Patient has CKD stage V, has refused dialysis in the past  She states that she has establish care with palliative  Still produces urine  Baseline creatinine 2.9-3.2, patient is at baseline    Plan:  Follow-up a.m. BMP

## 2024-09-26 NOTE — ASSESSMENT & PLAN NOTE
Patient reports having diarrhea for the past month, her hematochezia is recent.  Plan to rule out infectious etiology.  CRP within normal limits 1.9.  Patient has not had any diarrhea since 9/24.    Plan:  Follow-up enteric pathogen panel, stool and ova parasite panel.

## 2024-09-26 NOTE — ANESTHESIA PREPROCEDURE EVALUATION
Procedure:  COLONOSCOPY    Relevant Problems   CARDIO   (+) HLD (hyperlipidemia)      ENDO   (+) Secondary hyperparathyroidism of renal origin (HCC)   (+) Type 2 diabetes mellitus with stage 4 chronic kidney disease, with long-term current use of insulin (HCC)      GI/HEPATIC   (+) Gastroesophageal reflux disease without esophagitis   (+) Lower GI bleed      /RENAL   (+) Stage 5 chronic kidney disease not on chronic dialysis (HCC)      HEMATOLOGY   (+) Acute blood loss anemia   (+) Anemia in stage 5 chronic kidney disease, not on chronic dialysis  (HCC)      MUSCULOSKELETAL   (+) Fibromyalgia      NEURO/PSYCH   (+) Fibromyalgia   (+) Gastroparesis diabeticorum  (HCC)   (+) Mild vascular dementia without behavioral disturbance, psychotic disturbance, mood disturbance, or anxiety (HCC)   (+) Reactive depression      PULMONARY   (+) COPD (chronic obstructive pulmonary disease) (HCC)   (+) SHAYAN (obstructive sleep apnea)      Left Ventricle: Left ventricular cavity size is normal. Wall thickness  is normal. The left ventricular ejection fraction is 55%. Systolic  function is normal. Wall motion is normal. Diastolic function is mildly  abnormal, consistent with grade I (abnormal) relaxation.    Mitral Valve: There is mild regurgitation.    Tricuspid Valve: There is mild regurgitation.         Anesthesia Plan  ASA Score- 3     Anesthesia Type- IV sedation with anesthesia with ASA Monitors.         Additional Monitors:     Airway Plan:            Plan Factors-    Chart reviewed.                      Induction- intravenous.    Postoperative Plan-         Informed Consent- Anesthetic plan and risks discussed with patient.  I personally reviewed this patient with the CRNA. Discussed and agreed on the Anesthesia Plan with the CRNA..

## 2024-09-26 NOTE — INCIDENTAL FINDINGS
The following findings require follow up:  Radiographic finding   Finding: New 16 mm right basilar opacity in the lower chest   Follow up required: Follow-up with CT chest in 2 months    Please notify your primary care physician to assist with the follow-up    Incidental finding results were discussed with the Patient by Paris Noble MD on 09/26/24.   They expressed understanding and all questions answered.    No acute findings in the abdomen or pelvis.     16 mm right basilar opacity is likely atelectasis. Follow-up with CT chest in 2 months.

## 2024-09-26 NOTE — PHYSICAL THERAPY NOTE
PHYSICAL THERAPY NOTE    Patient Name: Trina Davis  Today's Date: 9/26/2024 09/26/24 [id number]   Note Type   Note Type Cancelled Session   Cancel Reasons Patient off floor/test  (Per EMR, pt off floor @ GI lab. Will follow)      Ina Mccord, PT     Home Suture Removal Text: Patient was provided a home suture removal kit and will remove their sutures at home.  If they have any questions or difficulties they will call the office.

## 2024-09-26 NOTE — QUICK NOTE
"807: Notified by RN that patient was having bloody bowel movements again.  Notified that patient was actively outputting precious red blood per rectum, approximately 800 cc.  Came to evaluate patient at bedside.  Small volume clots were noted in the toilet.  Patient endorsed having some lightheadedness, \"especially when standing\".  Denied abdominal pain at rest, shortness of breath, chest pain.  Patient said she had a few sips of cranberry juice and felt some discomfort in her left upper quadrant.  Area was mildly tender on palpation.  This examination finding was similar to yesterday, patient had not noted much improvement with the addition of Carafate.  Vitals were within normal limits.  Hemoglobin from 5 AM was 7.5  Ordered a stat recheck for hemoglobin.  Reached out to GI regarding this event.  GoLytely was ordered by GI.  "

## 2024-09-26 NOTE — ASSESSMENT & PLAN NOTE
Patient has history of GERD, is on Protonix 40 mg twice daily at home.  Endorses some right upper quadrant pain after drinking some juice.    Plan:  Continue Protonix 40 mg twice daily  Carafate 1 g 4 times daily

## 2024-09-26 NOTE — QUICK NOTE
Patient with significant worsening of bright red blood per rectum this morning.  States she reports she was doing well and was hopeful to be discharged this morning.  However this morning she had passage of multiple episodes of complete bright red blood.  She felt a little dizzy in the morning however vitals have been stable and this has now resolved.  GoLytely bowel prep was ordered.  We will plan for colonoscopy this afternoon. Suspect diverticular bleed vs ischemic colitis. N.p.o. except bowel prep.  Stop prep at 11 AM and plan for procedure early afternoon. Discussed with patient and her spouse at bedside. Informed primary team and patients nurse.

## 2024-09-26 NOTE — ASSESSMENT & PLAN NOTE
Lab Results   Component Value Date    HGBA1C 6.4 (H) 09/24/2024       Recent Labs     09/25/24  0702 09/25/24  1058 09/25/24  1614 09/25/24 2123   POCGLU 83 115 110 95       Blood Sugar Average: Last 72 hrs:  (P) 127.9380736477774343  Continue home regimen insulin subcutaneous insulin, SSI, clear liquid diet for now

## 2024-09-26 NOTE — PROGRESS NOTES
Progress Note - Hospitalist   Name: Trina Davis 74 y.o. female I MRN: [id number]  Unit/Bed#: S -01 I Date of Admission: 9/24/2024   Date of Service: 9/26/2024 I Hospital Day: 2  \  Assessment & Plan  Lower GI bleed  Patient presenting with over a month worsening shortness of breath with exertion, dizziness with ambulation.  Patient's hemoglobin was 11 as of August, was 8.7 in the ED. Had 2 episodes of maroon-colored stools in the ED, guaiac positive test on [facility]. GI evaluated the patient in the ED, due to the patient's stability and no further episodes of maroon-colored stools, they are deferring urgent colonoscopy.   9/25 CT abdomen and pelvis without contrast, no acute findings.  16mm right basilar opacity, likely atelectasis however radiology recommending CT chest follow-up in 2 months.    9/26 patient was having bloody bowel movements again.  Notified that patient was actively outputting precious red blood per rectum, approximately 100 cc.  Due to active bleeding, patient was scoped with findings including bleeding Dula Jorden in the ascending colon, same as the one seen on her previous colonoscopy.  Lesion was clipped x 2.    Plan:  Clear liquid diet for now  Monitor hemodynamics for now  Monitor stool output  GI to follow up in the morning, appreciate recommendations  Transfuse if hemoglobin less than 7, patient is consented for blood products, type and screen is active  Follow-up a.m. CBC    Diarrhea  Patient reports having diarrhea for the past month, her hematochezia is recent.  Plan to rule out infectious etiology.  CRP within normal limits 1.9.  Patient has not had any diarrhea since 9/24.    Plan:  Follow-up enteric pathogen panel, stool and ova parasite panel.  Acute blood loss anemia  Acute blood loss anemia secondary to lower GI bleed  Troponins normal, BNP normal, no signs of volume overload.  See plan above  SHAYAN (obstructive sleep apnea)  CPAP at bedtime  Type 2 diabetes mellitus with stage 4  chronic kidney disease, with long-term current use of insulin ([facility])  Lab Results   Component Value Date    HGBA1C 6.4 (H) 09/24/2024       Recent Labs     09/25/24  0702 09/25/24  1058 09/25/24  1614 09/25/24 2123   POCGLU 83 115 110 95       Blood Sugar Average: Last 72 hrs:  (P) 127.1218521965363381  Continue home regimen insulin subcutaneous insulin, SSI, clear liquid diet for now  Stage 5 chronic kidney disease not on chronic dialysis ([facility])  Lab Results   Component Value Date    EGFR 14 09/26/2024    EGFR 14 09/25/2024    EGFR 12 09/24/2024    CREATININE 2.95 (H) 09/26/2024    CREATININE 3.09 (H) 09/25/2024    CREATININE 3.44 (H) 09/24/2024   Patient has CKD stage V, has refused dialysis in the past  She states that she has establish care with palliative  Still produces urine  Baseline creatinine 2.9-3.2, patient is at baseline    Plan:  Follow-up a.m. BMP  Gastroesophageal reflux disease without esophagitis  Patient has history of GERD, is on Protonix 40 mg twice daily at home.  Endorses some right upper quadrant pain after drinking some juice.    Plan:  Continue Protonix 40 mg twice daily  Carafate 1 g 4 times daily    VTE Pharmacologic Prophylaxis: VTE Score: 4 holding in setting of acute blood loss anemia, SCDs ordered    Mobility:   Basic Mobility Inpatient Raw Score: 17  JH-HLM Goal: 5: Stand one or more mins  JH-HLM Achieved: 6: Walk 10 steps or more  JH-HLM Goal achieved. Continue to encourage appropriate mobility.    Patient Centered Rounds: I performed bedside rounds with nursing staff today.   Discussions with Specialists or Other Care Team Provider: GI    Education and Discussions with Family / Patient: Patient declined call to .     Current Length of Stay: 2 day(s)  Current Patient Status: Inpatient   Certification Statement: The patient will continue to require additional inpatient hospital stay due to GI bleed  Discharge Plan: Anticipate discharge in 48-72 hrs to home.    Code  Status: Level 3 - DNAR and DNI    Subjective   No acute events overnight. Denied chest pain, dyspnea, nausea, vomiting, fevers, and chills.  Patient stated that her bowel movements were brown.  Patient was having some left-sided abdominal discomfort.  Patient was passing precious red blood, small-volume clots.  She endorsed some lightheadedness while standing up, but no abdominal pain.    Objective     Vitals:   Temp (24hrs), Av.2 °F (36.8 °C), Min:98 °F (36.7 °C), Max:98.4 °F (36.9 °C)    Temp:  [98 °F (36.7 °C)-98.4 °F (36.9 °C)] 98 °F (36.7 °C)  HR:  [51-55] 55  Resp:  [16-18] 16  BP: (128-159)/(61-69) 128/61  SpO2:  [89 %-98 %] 92 %  There is no height or weight on file to calculate BMI.     Input and Output Summary (last 24 hours):     Intake/Output Summary (Last 24 hours) at 2024 0629  Last data filed at 2024 195  Gross per 24 hour   Intake 720 ml   Output 300 ml   Net 420 ml       Physical Exam  Vitals reviewed.   Constitutional:       General: She is not in acute distress.     Appearance: Normal appearance. She is not ill-appearing.   HENT:      Head: Normocephalic and atraumatic.      Mouth/Throat:      Mouth: Mucous membranes are moist.   Eyes:      Pupils: Pupils are equal, round, and reactive to light.   Cardiovascular:      Rate and Rhythm: Normal rate and regular rhythm.      Heart sounds: No murmur heard.     No friction rub.   Pulmonary:      Effort: Pulmonary effort is normal. No respiratory distress.      Breath sounds: Normal breath sounds. No wheezing.   Abdominal:      General: Abdomen is flat. There is no distension.      Palpations: Abdomen is soft.      Tenderness: There is abdominal tenderness.      Comments: Mild abdominal tenderness on the left upper and lower quadrants   Skin:     General: Skin is warm and dry.   Neurological:      General: No focal deficit present.      Mental Status: She is alert and oriented to person, place, and time.           Lines/Drains:  Lines/Drains/Airways       Active Status       None                            Lab Results: I have reviewed the following results:    Results from last 7 days   Lab Units 09/26/24  0510   WBC Thousand/uL 7.11   HEMOGLOBIN g/dL 7.5*   HEMATOCRIT % 24.5*   PLATELETS Thousands/uL 262   SEGS PCT % 57   LYMPHO PCT % 31   MONO PCT % 8   EOS PCT % 4     Results from last 7 days   Lab Units 09/26/24  0510 09/25/24  0506 09/24/24  1110   SODIUM mmol/L 139   < > 136   POTASSIUM mmol/L 3.5   < > 3.8   CHLORIDE mmol/L 104   < > 102   CO2 mmol/L 28   < > 25   BUN mg/dL 47*   < > 63*   CREATININE mg/dL 2.95*   < > 3.44*   ANION GAP mmol/L 7   < > 9   CALCIUM mg/dL 8.4   < > 8.8   ALBUMIN g/dL  --   --  3.8   TOTAL BILIRUBIN mg/dL  --   --  0.25   ALK PHOS U/L  --   --  67   ALT U/L  --   --  48   AST U/L  --   --  36   GLUCOSE RANDOM mg/dL 79   < > 145*    < > = values in this interval not displayed.         Results from last 7 days   Lab Units 09/25/24  2123 09/25/24  1614 09/25/24  1058 09/25/24  0702 09/25/24  0303 09/24/24  2107 09/24/24  1853 09/24/24  1522 09/24/24  1322   POC GLUCOSE mg/dl 95 110 115 83 71 204* 292* 96 78     Results from last 7 days   Lab Units 09/24/24  1110   HEMOGLOBIN A1C % 6.4*           Recent Cultures (last 7 days):         Imaging Review: No pertinent imaging studies reviewed.  Other Studies: EKG was reviewed.     Last 24 Hours Medication List:     Current Facility-Administered Medications:     acetaminophen (TYLENOL) tablet 650 mg, Q6H PRN    amLODIPine (NORVASC) tablet 10 mg, Daily    aspirin (ECOTRIN LOW STRENGTH) EC tablet 81 mg, Daily    calcium carbonate-vitamin D 500 mg-5 mcg tablet 1 tablet, Daily With Breakfast    Cholecalciferol (VITAMIN D3) tablet 2,000 Units, Daily    co-enzyme Q-10 capsule 100 mg, Daily    escitalopram (LEXAPRO) tablet 10 mg, Daily    fish oil capsule 1,000 mg, Daily    gabapentin (NEURONTIN) capsule 100 mg, TID PRN    hydrALAZINE (APRESOLINE)  tablet 25 mg, BID    insulin detemir (LEVEMIR) subcutaneous injection 10 Units, QPM    insulin lispro (HumALOG/ADMELOG) 100 units/mL subcutaneous injection 1-5 Units, 4x Daily (AC & HS) **AND** Fingerstick Glucose (POCT), 4x Daily AC and at bedtime    levothyroxine tablet 100 mcg, Early Morning    metoprolol tartrate (LOPRESSOR) tablet 50 mg, Q12H    pantoprazole (PROTONIX) EC tablet 40 mg, BID    pravastatin (PRAVACHOL) tablet 40 mg, Daily With Dinner    sucralfate (CARAFATE) tablet 1 g, 4x Daily (AC & HS)    Administrative Statements   Today, Patient Was Seen By: Paris Noble MD      **Please Note: This note may have been constructed using a voice recognition system.**

## 2024-09-27 PROBLEM — E87.6 HYPOKALEMIA: Status: ACTIVE | Noted: 2024-09-27

## 2024-09-27 LAB
ANION GAP SERPL CALCULATED.3IONS-SCNC: 9 MMOL/L (ref 4–13)
BUN SERPL-MCNC: 39 MG/DL (ref 5–25)
C DIPHTHERIAE AB SER IA-ACNC: 0.2 IU/ML
CALCIUM SERPL-MCNC: 8.3 MG/DL (ref 8.4–10.2)
CHLORIDE SERPL-SCNC: 102 MMOL/L (ref 96–108)
CO2 SERPL-SCNC: 27 MMOL/L (ref 21–32)
CREAT SERPL-MCNC: 2.83 MG/DL (ref 0.6–1.3)
ERYTHROCYTE [DISTWIDTH] IN BLOOD BY AUTOMATED COUNT: 14.1 % (ref 11.6–15.1)
ERYTHROCYTE [DISTWIDTH] IN BLOOD BY AUTOMATED COUNT: 14.3 % (ref 11.6–15.1)
GFR SERPL CREATININE-BSD FRML MDRD: 15 ML/MIN/1.73SQ M
GLUCOSE SERPL-MCNC: 112 MG/DL (ref 65–140)
GLUCOSE SERPL-MCNC: 132 MG/DL (ref 65–140)
GLUCOSE SERPL-MCNC: 152 MG/DL (ref 65–140)
GLUCOSE SERPL-MCNC: 85 MG/DL (ref 65–140)
GLUCOSE SERPL-MCNC: 97 MG/DL (ref 65–140)
HCT VFR BLD AUTO: 22.2 % (ref 34.8–46.1)
HCT VFR BLD AUTO: 23.7 % (ref 34.8–46.1)
HGB BLD-MCNC: 7 G/DL (ref 11.5–15.4)
HGB BLD-MCNC: 7.4 G/DL (ref 11.5–15.4)
MCH RBC QN AUTO: 26.5 PG (ref 26.8–34.3)
MCH RBC QN AUTO: 26.6 PG (ref 26.8–34.3)
MCHC RBC AUTO-ENTMCNC: 31.2 G/DL (ref 31.4–37.4)
MCHC RBC AUTO-ENTMCNC: 31.5 G/DL (ref 31.4–37.4)
MCV RBC AUTO: 84 FL (ref 82–98)
MCV RBC AUTO: 85 FL (ref 82–98)
PLATELET # BLD AUTO: 266 THOUSANDS/UL (ref 149–390)
PLATELET # BLD AUTO: 267 THOUSANDS/UL (ref 149–390)
PMV BLD AUTO: 10.2 FL (ref 8.9–12.7)
PMV BLD AUTO: 11.1 FL (ref 8.9–12.7)
POTASSIUM SERPL-SCNC: 3.3 MMOL/L (ref 3.5–5.3)
RBC # BLD AUTO: 2.63 MILLION/UL (ref 3.81–5.12)
RBC # BLD AUTO: 2.79 MILLION/UL (ref 3.81–5.12)
SODIUM SERPL-SCNC: 138 MMOL/L (ref 135–147)
WBC # BLD AUTO: 11.27 THOUSAND/UL (ref 4.31–10.16)
WBC # BLD AUTO: 15.58 THOUSAND/UL (ref 4.31–10.16)

## 2024-09-27 PROCEDURE — 80048 BASIC METABOLIC PNL TOTAL CA: CPT

## 2024-09-27 PROCEDURE — 85027 COMPLETE CBC AUTOMATED: CPT

## 2024-09-27 PROCEDURE — 85027 COMPLETE CBC AUTOMATED: CPT | Performed by: INTERNAL MEDICINE

## 2024-09-27 PROCEDURE — 97129 THER IVNTJ 1ST 15 MIN: CPT

## 2024-09-27 PROCEDURE — 99233 SBSQ HOSP IP/OBS HIGH 50: CPT | Performed by: STUDENT IN AN ORGANIZED HEALTH CARE EDUCATION/TRAINING PROGRAM

## 2024-09-27 PROCEDURE — 99232 SBSQ HOSP IP/OBS MODERATE 35: CPT | Performed by: INTERNAL MEDICINE

## 2024-09-27 PROCEDURE — 82948 REAGENT STRIP/BLOOD GLUCOSE: CPT

## 2024-09-27 RX ORDER — POTASSIUM CHLORIDE 1500 MG/1
40 TABLET, EXTENDED RELEASE ORAL ONCE
Status: COMPLETED | OUTPATIENT
Start: 2024-09-27 | End: 2024-09-27

## 2024-09-27 RX ORDER — POTASSIUM CHLORIDE 14.9 MG/ML
20 INJECTION INTRAVENOUS
Status: DISCONTINUED | OUTPATIENT
Start: 2024-09-27 | End: 2024-09-27

## 2024-09-27 RX ORDER — POLYETHYLENE GLYCOL 3350 17 G/17G
17 POWDER, FOR SOLUTION ORAL DAILY
Status: DISCONTINUED | OUTPATIENT
Start: 2024-09-27 | End: 2024-09-28 | Stop reason: HOSPADM

## 2024-09-27 RX ORDER — SIMETHICONE 80 MG
80 TABLET,CHEWABLE ORAL EVERY 6 HOURS PRN
Status: DISCONTINUED | OUTPATIENT
Start: 2024-09-27 | End: 2024-09-28 | Stop reason: HOSPADM

## 2024-09-27 RX ORDER — LORATADINE 10 MG/1
10 TABLET ORAL ONCE
Status: COMPLETED | OUTPATIENT
Start: 2024-09-27 | End: 2024-09-27

## 2024-09-27 RX ADMIN — GABAPENTIN 100 MG: 100 CAPSULE ORAL at 14:28

## 2024-09-27 RX ADMIN — METOPROLOL TARTRATE 50 MG: 50 TABLET, FILM COATED ORAL at 05:10

## 2024-09-27 RX ADMIN — Medication 100 MG: at 08:13

## 2024-09-27 RX ADMIN — PANTOPRAZOLE SODIUM 40 MG: 40 TABLET, DELAYED RELEASE ORAL at 08:13

## 2024-09-27 RX ADMIN — INSULIN DETEMIR 10 UNITS: 100 INJECTION, SOLUTION SUBCUTANEOUS at 18:14

## 2024-09-27 RX ADMIN — IRON SUCROSE 200 MG: 20 INJECTION, SOLUTION INTRAVENOUS at 10:01

## 2024-09-27 RX ADMIN — SIMETHICONE 80 MG: 80 TABLET, CHEWABLE ORAL at 05:10

## 2024-09-27 RX ADMIN — HYDRALAZINE HYDROCHLORIDE 25 MG: 25 TABLET ORAL at 08:13

## 2024-09-27 RX ADMIN — PANTOPRAZOLE SODIUM 40 MG: 40 TABLET, DELAYED RELEASE ORAL at 21:36

## 2024-09-27 RX ADMIN — SUCRALFATE 1 G: 1 TABLET ORAL at 08:22

## 2024-09-27 RX ADMIN — INSULIN LISPRO 1 UNITS: 100 INJECTION, SOLUTION INTRAVENOUS; SUBCUTANEOUS at 21:36

## 2024-09-27 RX ADMIN — ESCITALOPRAM OXALATE 10 MG: 10 TABLET ORAL at 08:14

## 2024-09-27 RX ADMIN — SUCRALFATE 1 G: 1 TABLET ORAL at 21:36

## 2024-09-27 RX ADMIN — AMLODIPINE BESYLATE 10 MG: 10 TABLET ORAL at 08:14

## 2024-09-27 RX ADMIN — Medication 1 TABLET: at 08:14

## 2024-09-27 RX ADMIN — PRAVASTATIN SODIUM 40 MG: 40 TABLET ORAL at 16:41

## 2024-09-27 RX ADMIN — LORATADINE 10 MG: 10 TABLET ORAL at 18:13

## 2024-09-27 RX ADMIN — Medication 2000 UNITS: at 08:13

## 2024-09-27 RX ADMIN — POTASSIUM CHLORIDE 40 MEQ: 1500 TABLET, EXTENDED RELEASE ORAL at 10:02

## 2024-09-27 RX ADMIN — SUCRALFATE 1 G: 1 TABLET ORAL at 16:41

## 2024-09-27 RX ADMIN — METOPROLOL TARTRATE 50 MG: 50 TABLET, FILM COATED ORAL at 16:41

## 2024-09-27 RX ADMIN — SUCRALFATE 1 G: 1 TABLET ORAL at 11:17

## 2024-09-27 RX ADMIN — POLYETHYLENE GLYCOL 3350 17 G: 17 POWDER, FOR SOLUTION ORAL at 10:47

## 2024-09-27 RX ADMIN — HYDRALAZINE HYDROCHLORIDE 25 MG: 25 TABLET ORAL at 21:36

## 2024-09-27 RX ADMIN — LEVOTHYROXINE SODIUM 100 MCG: 100 TABLET ORAL at 05:10

## 2024-09-27 RX ADMIN — OMEGA-3 FATTY ACIDS CAP 1000 MG 1000 MG: 1000 CAP at 08:13

## 2024-09-27 NOTE — ASSESSMENT & PLAN NOTE
Lab Results   Component Value Date    EGFR 15 09/27/2024    EGFR 14 09/26/2024    EGFR 14 09/25/2024    CREATININE 2.83 (H) 09/27/2024    CREATININE 2.95 (H) 09/26/2024    CREATININE 3.09 (H) 09/25/2024   Patient has CKD stage V, has refused dialysis in the past  She states that she has establish care with palliative  Still produces urine  Baseline creatinine 2.9-3.2, patient is at baseline    Plan:  Follow-up a.m. BMP

## 2024-09-27 NOTE — PLAN OF CARE
Problem: PAIN - ADULT  Goal: Verbalizes/displays adequate comfort level or baseline comfort level  Description: Interventions:  - Encourage patient to monitor pain and request assistance  - Assess pain using appropriate pain scale  - Administer analgesics based on type and severity of pain and evaluate response  - Implement non-pharmacological measures as appropriate and evaluate response  - Consider cultural and social influences on pain and pain management  - Notify physician/advanced practitioner if interventions unsuccessful or patient reports new pain  Outcome: Progressing     Problem: INFECTION - ADULT  Goal: Absence or prevention of progression during hospitalization  Description: INTERVENTIONS:  - Assess and monitor for signs and symptoms of infection  - Monitor lab/diagnostic results  - Monitor all insertion sites, i.e. indwelling lines, tubes, and drains  - Monitor endotracheal if appropriate and nasal secretions for changes in amount and color  - [place] appropriate cooling/warming therapies per order  - Administer medications as ordered  - Instruct and encourage patient and family to use good hand hygiene technique  - Identify and instruct in appropriate isolation precautions for identified infection/condition  Outcome: Progressing  Goal: Absence of fever/infection during neutropenic period  Description: INTERVENTIONS:  - Monitor WBC    Outcome: Progressing     Problem: SAFETY ADULT  Goal: Patient will remain free of falls  Description: INTERVENTIONS:  - Educate patient/family on patient safety including physical limitations  - Instruct patient to call for assistance with activity   - Consult OT/PT to assist with strengthening/mobility   - Keep Call bell within reach  - Keep bed low and locked with side rails adjusted as appropriate  - Keep care items and personal belongings within reach  - Initiate and maintain comfort rounds  - Make Fall Risk Sign visible to staff  - Offer Toileting every 2 Hours,  in advance of need  - Initiate/Maintain bed/chair alarm  - Obtain necessary fall risk management equipment  - Apply yellow socks and bracelet for high fall risk patients  - Consider moving patient to room near nurses station  Outcome: Progressing  Goal: Maintain or return to baseline ADL function  Description: INTERVENTIONS:  -  Assess patient's ability to carry out ADLs; assess patient's baseline for ADL function and identify physical deficits which impact ability to perform ADLs (bathing, care of mouth/teeth, toileting, grooming, dressing, etc.)  - Assess/evaluate cause of self-care deficits   - Assess range of motion  - Assess patient's mobility; develop plan if impaired  - Assess patient's need for assistive devices and provide as appropriate  - Encourage maximum independence but intervene and supervise when necessary  - Involve family in performance of ADLs  - Assess for home care needs following discharge   - Consider OT consult to assist with ADL evaluation and planning for discharge  - Provide patient education as appropriate  Outcome: Progressing  Goal: Maintains/Returns to pre admission functional level  Description: INTERVENTIONS:  - Perform AM-PAC 6 Click Basic Mobility/ Daily Activity assessment daily.  - Set and communicate daily mobility goal to care team and patient/family/caregiver.   - Collaborate with rehabilitation services on mobility goals if consulted  - Perform Range of Motion 3 times a day.  - Reposition patient every 2 hours.  - Dangle patient 3 times a day  - Stand patient 3 times a day  - Ambulate patient 3 times a day  - Out of bed to chair 3 times a day   - Out of bed for meals 3 times a day  - Out of bed for toileting  - Record patient progress and toleration of activity level   Outcome: Progressing     Problem: DISCHARGE PLANNING  Goal: Discharge to home or other facility with appropriate resources  Description: INTERVENTIONS:  - Identify barriers to discharge w/patient and  caregiver  - Arrange for needed discharge resources and transportation as appropriate  - Identify discharge learning needs (meds, wound care, etc.)  - Arrange for interpretive services to assist at discharge as needed  - Refer to Case Management Department for coordinating discharge planning if the patient needs post-hospital services based on physician/advanced practitioner order or complex needs related to functional status, cognitive ability, or social support system  Outcome: Progressing     Problem: Knowledge Deficit  Goal: Patient/family/caregiver demonstrates understanding of disease process, treatment plan, medications, and discharge instructions  Description: Complete learning assessment and assess knowledge base.  Interventions:  - Provide teaching at level of understanding  - Provide teaching via preferred learning methods  Outcome: Progressing

## 2024-09-27 NOTE — ASSESSMENT & PLAN NOTE
Lab Results   Component Value Date    HGBA1C 6.4 (H) 09/24/2024       Recent Labs     09/26/24  1048 09/26/24  1533 09/26/24  2105 09/27/24  0711   POCGLU 88 106 157* 85       Blood Sugar Average: Last 72 hrs:  (P) [id number]  Continue home regimen insulin subcutaneous insulin, SSI, clear liquid diet for now

## 2024-09-27 NOTE — PLAN OF CARE
Problem: OCCUPATIONAL THERAPY ADULT  Goal: Performs self-care activities at highest level of function for planned discharge setting.  See evaluation for individualized goals.  Description: Treatment Interventions: ADL retraining, Functional transfer training, UE strengthening/ROM, Endurance training, Patient/family training, Equipment evaluation/education, Activityengagement, Compensatory technique education          See flowsheet documentation for full assessment, interventions and recommendations.   Outcome: Adequate for Discharge  Note: Limitation: Decreased ADL status, Decreased endurance, Decreased self-care trans, Decreased high-level ADLs, Decreased UE strength (decreased balance and mobility)     Assessment: Pt is seen for OT treatment session for cognitive assessment.  Pt completed MoCA 8.1 in a quiet environment with reading glasses donned and scored 22/30, implying mild cognitive deficits for age/education.  This is a significant improvement from her last assessment in fall 2023 at OP OT, during which she scored 11/30.  Pt demonstrates ability to safely mobilize in her hospital room, complete toileting I'ly, functional transfers with Nancy.  At this point pt has progressed to no longer require acute care OT and orders to be d/c at this time.  Recommend level III rehab resources at an OP neuro clinic if patient would like to address her functional cognition.  Recommend continued family support in home environment.     Rehab Resource Intensity Level, OT: III (Minimum Resource Intensity)

## 2024-09-27 NOTE — ASSESSMENT & PLAN NOTE
Patient presenting with over a month worsening shortness of breath with exertion, dizziness with ambulation.  Patient's hemoglobin was 11 as of August, was 8.7 in the ED. Had 2 episodes of maroon-colored stools in the ED, guaiac positive test on [facility]. GI evaluated the patient in the ED, due to the patient's stability and no further episodes of maroon-colored stools, they are deferring urgent colonoscopy.   9/25 CT abdomen and pelvis without contrast, no acute findings.  16mm right basilar opacity, likely atelectasis however radiology recommending CT chest follow-up in 2 months.    9/26 patient was having bloody bowel movements again.  Notified that patient was actively outputting precious red blood per rectum, approximately 100 cc.  Due to active bleeding, patient was scoped with findings including bleeding Dula Jorden in the ascending colon, same as the one seen on her previous colonoscopy.  Lesion was clipped x 2.    If hemoglobin remained stable and patient has no further bloody bowel movements, will consider for discharge tomorrow.    Plan:  Diet GI: Low fiber, low residue  Monitor hemodynamics for now  Monitor stool output  Transfuse if hemoglobin less than 7, patient is consented for blood products, type and screen is active  Status post 9/27 IV Venofer  Follow-up a.m. CBC

## 2024-09-27 NOTE — ASSESSMENT & PLAN NOTE
74-year-old female with diabetes, COPD, CKD who presented to the emergency room for worsening shortness of breath and dizziness over the past few days found to have hemoglobin of 8.7 down from 11 one month ago.  She reports normal bowel movements at home however developed small maroon-colored stool x 2 in the emergency room. VSS.    Colonoscopy 8/30/2024 with removal of few polyps, internal hemorrhoids and a small dieulafoy lesion in the ascending colon s/p clip x 2. EGD earlier this year unremarkable.  Hgb 11.4 -> 8.7 -> 7.4 -> 7.0  S/p colonoscopy 9/26 with single dieulafoy lesion in the ascending colon status post 2 clips.  There were also 2 polyps that were removed.    Will advance diet  Recommend monitoring again overnight.  Defer transfusions to primary team.  Recheck hemoglobin this evening and tomorrow morning  Monitor stool output    If hemoglobin remains stable and no further bloody bowel movements likely could be discharged tomorrow  If patient were to have recurrence of bleeding, would need to consider repeat colonoscopy however would also consider surgical evaluation due to recurrent bleeding from dieulafoy lesion. Site was tattooed.

## 2024-09-27 NOTE — PROGRESS NOTES
"Progress Note - Gastroenterology   Name: Trina Davis 74 y.o. female I MRN: [id number]  Unit/Bed#: S -01 I Date of Admission: 9/24/2024   Date of Service: 9/27/2024 I Hospital Day: 3     Assessment & Plan  Acute blood loss anemia  74-year-old female with diabetes, COPD, CKD who presented to the emergency room for worsening shortness of breath and dizziness over the past few days found to have hemoglobin of 8.7 down from 11 one month ago.  She reports normal bowel movements at home however developed small maroon-colored stool x 2 in the emergency room. VSS.    Colonoscopy 8/30/2024 with removal of few polyps, internal hemorrhoids and a small dieulafoy lesion in the ascending colon s/p clip x 2. EGD earlier this year unremarkable.  Hgb 11.4 -> 8.7 -> 7.4 -> 7.0  S/p colonoscopy 9/26 with single dieulafoy lesion in the ascending colon status post 2 clips.  There were also 2 polyps that were removed.    Will advance diet  Recommend monitoring again overnight.  Defer transfusions to primary team.  Recheck hemoglobin this evening and tomorrow morning  Monitor stool output    If hemoglobin remains stable and no further bloody bowel movements likely could be discharged tomorrow  If patient were to have recurrence of bleeding, would need to consider repeat colonoscopy however would also consider surgical evaluation due to recurrent bleeding from dieulafoy lesion. Site was tattooed.    Lower GI bleed  See \"acute blood loss anemia\" above.    History of Present Illness   24 Hour Events : No significant events overnight.  Patient reports having 2 bowel movements after colonoscopy.  The first 1 was light pink and she reports the other 1 did not seem to have any blood. Hgb 7.7 -> 8.2 -> 7.0.  Subjective : Patient reports she is doing well.  No abdominal pain.  She reports she would like to advance her diet.  She is having some gas and reflux.  2 bowel movements which were small amount after procedure 1 with some small amount " of blood the other normal.    Objective      Temp:  [97.6 °F (36.4 °C)-99.1 °F (37.3 °C)] 99.1 °F (37.3 °C)  HR:  [53-59] 59  Resp:  [14-16] 16  BP: (105-154)/(54-70) 154/62  O2 Device: None (Room air)          I/O         09/25 0701 09/26 0700 09/26 0701 09/27 0700 09/27 0701  09/28 0700    P.O. 720 0     I.V.  400     Total Intake 720 400     Urine 300 500 650    Stool 0 200     Total Output 300 700 650    Net +420 -300 -650           Unmeasured Stool Occurrence 1 x 1 x           Lines/Drains/Airways       Active Status       None                  Physical Exam  Vitals and nursing note reviewed.   Constitutional:       General: She is not in acute distress.     Appearance: She is well-developed.   HENT:      Head: Normocephalic and atraumatic.   Eyes:      Conjunctiva/sclera: Conjunctivae normal.   Cardiovascular:      Rate and Rhythm: Normal rate and regular rhythm.      Heart sounds: No murmur heard.  Pulmonary:      Effort: Pulmonary effort is normal. No respiratory distress.      Breath sounds: Normal breath sounds.   Abdominal:      Palpations: Abdomen is soft.      Tenderness: There is no abdominal tenderness.   Musculoskeletal:         General: No swelling.      Cervical back: Neck supple.   Skin:     General: Skin is warm and dry.      Capillary Refill: Capillary refill takes less than 2 seconds.   Neurological:      Mental Status: She is alert.   Psychiatric:         Mood and Affect: Mood normal.

## 2024-09-27 NOTE — DISCHARGE INSTR - AVS FIRST PAGE
Dear Trina Davis,     It was our pleasure to care for you here at [facility].  It is our hope that we were always able to exceed the expected standards for your care during your stay.  You were hospitalized due to lower GI bleeding.  You were cared for on the fourth floor by Paris Noble MD under the service of Rodolfo Barcenas,  with the [facility] who covers for your primary care physician (PCP), Tree Aguilar MD, while you were hospitalized.  If you have any questions or concerns related to this hospitalization, you may contact us at .  For follow up as well as any medication refills, we recommend that you follow up with your primary care physician.  A registered nurse will reach out to you by phone within a few days after your discharge to answer any additional questions that you may have after going home.  However, at this time we provide for you here, the most important instructions / recommendations at discharge:     Notable Medication Adjustments -   Start taking Protonix 40 mg every 12 hours starting tonight 9/28/24  Start taking Carafate 1g 3 times a day with meals starting tonight 9/20/2024  Stop taking Pepcid(famotidine)  Discussed with your PCP about starting oral iron or IV iron infusions  Testing Required after Discharge -   CT chest in 2 months, please contact your primary care physician to assist in this follow-up-as per CT chest during the hospital stay [id number] mm right basilar opacity is likely atelectasis   CBC within 1 week of discharge-to be ordered by the primary care physician  Important follow up information -   Follow-up with gastroenterology outpatient  Please make an appointment with your primary care physician within 1 week of discharge as soon as possible  Other Instructions -   Please call provider for active or persistent bleeding   Please call provider for persistent dizziness or lightheadedness   Please call  provider for extreme fatigue   Please review this entire after visit summary as additional general instructions including medication list, appointments, activity, diet, any pertinent wound care, and other additional recommendations from your care team that may be provided for you.      Sincerely,     Paris Noble MD

## 2024-09-27 NOTE — OCCUPATIONAL THERAPY NOTE
Occupational Therapy Progress Note     Patient Name: Trina Davis  Today's Date: 9/27/2024  Problem List  Principal Problem:    Lower GI bleed  Active Problems:    Gastroesophageal reflux disease without esophagitis    SHAYAN (obstructive sleep apnea)    Type 2 diabetes mellitus with stage 4 chronic kidney disease, with long-term current use of insulin (HCC)    Acute blood loss anemia    Stage 5 chronic kidney disease not on chronic dialysis ([facility])    Diarrhea    Hypokalemia         09/27/24 1140   OT Last Visit   OT Visit Date 09/27/24   Note Type   Note Type Treatment   Pain Assessment   Pain Assessment Tool 0-10   Pain Score No Pain   Restrictions/Precautions   Weight Bearing Precautions Per Order No   Other Precautions Cognitive;Fall Risk   Lifestyle   Autonomy Pt is I with ADLs, requires assist with IADLs, ambulates I'ly with use of rw   Reciprocal Relationships spouse and son   Service to Others retired   ADL   Toileting Assistance  6  Modified independent   Transfers   Sit to Stand 5  Supervision   Stand to Sit 6  Modified independent   Toilet transfer 5  Supervision   Additional Comments use of rw   Functional Mobility   Functional Mobility 5  Supervision   Additional Comments use of rw   Cognition   Overall Cognitive Status Impaired   Arousal/Participation Alert;Cooperative   Attention Within functional limits   Orientation Level Oriented X4   Memory Decreased short term memory   Following Commands Follows one step commands with increased time or repetition   Cognition Assessment Tools MOCA   Score 22   Activity Tolerance   Activity Tolerance Patient tolerated treatment well   Assessment   Assessment Pt is seen for OT treatment session for cognitive assessment.  Pt completed MoCA 8.1 in a quiet environment with reading glasses donned and scored 22/30, implying mild cognitive deficits for age/education.  This is a significant improvement from her last assessment in fall 2023 at OP OT, during which she scored  11/30.  Pt demonstrates ability to safely mobilize in her hospital room, complete toileting I'ly, functional transfers with Nancy.  At this point pt has progressed to no longer require acute care OT and orders to be d/c at this time.  Recommend level III rehab resources at an OP neuro clinic if patient would like to address her functional cognition.  Recommend continued family support in home environment.   Plan   Treatment Interventions Cognitive reorientation;Continued evaluation   OT Frequency   (d/c today)   Discharge Recommendation   Rehab Resource Intensity Level, OT III (Minimum Resource Intensity)   AM-PAC Daily Activity Inpatient   Lower Body Dressing 4   Bathing 4   Toileting 4   Upper Body Dressing 4   Grooming 4   Eating 4   Daily Activity Raw Score 24   Daily Activity Standardized Score (Calc for Raw Score >=11) 57.54   AM-PAC Applied Cognition Inpatient   Following a Speech/Presentation 3   Understanding Ordinary Conversation 4   Taking Medications 3   Remembering Where Things Are Placed or Put Away 3   Remembering List of 4-5 Errands 3   Taking Care of Complicated Tasks 3   Applied Cognition Raw Score 19   Applied Cognition Standardized Score 39.77     PT SEEN FOR ERIK COGNITIVE ASSESSMENT.  SCORED  22/30 INDICATING MILD COGNITIVE IMPAIRMENT FOR AGE/EDUCATION.  SCORES ARE AS FOLLOWS:    VISUOSPATIAL/EXECUTIVE FUNCTION: 3/5    NAMING: 3/3.    ATTENTION: 4/6.    LANGUAGE: 2/3.     ABSTRACTION: 2/2. Pt was able to identify the similarity of two items x2 trials.    DELAYED RECALL: 3/5. Pt recalled 3/5 words without cues.     Pt recalled 1/5 words with category cue. Pt recalled 1/5 words with multiple choice options.     ORIENTATION: 5/6. Pt is oriented to date, year, day, place and city. Disoriented to month    Mili Membreno, MOT, OTR/L, CSRS  [place] License [id number]  [place] License [id number]

## 2024-09-27 NOTE — PROGRESS NOTES
Progress Note - Hospitalist   Name: Trina Davis 74 y.o. female I MRN: [id number]  Unit/Bed#: S -01 I Date of Admission: 9/24/2024   Date of Service: 9/27/2024 I Hospital Day: 3  \  Assessment & Plan  Lower GI bleed  Patient presenting with over a month worsening shortness of breath with exertion, dizziness with ambulation.  Patient's hemoglobin was 11 as of August, was 8.7 in the ED. Had 2 episodes of maroon-colored stools in the ED, guaiac positive test on [facility]. GI evaluated the patient in the ED, due to the patient's stability and no further episodes of maroon-colored stools, they are deferring urgent colonoscopy.   9/25 CT abdomen and pelvis without contrast, no acute findings.  16mm right basilar opacity, likely atelectasis however radiology recommending CT chest follow-up in 2 months.    9/26 patient was having bloody bowel movements again.  Notified that patient was actively outputting precious red blood per rectum, approximately 100 cc.  Due to active bleeding, patient was scoped with findings including bleeding Dula Jorden in the ascending colon, same as the one seen on her previous colonoscopy.  Lesion was clipped x 2.    If hemoglobin remained stable and patient has no further bloody bowel movements, will consider for discharge tomorrow.    Plan:  Diet GI: Low fiber, low residue  Monitor hemodynamics for now  Monitor stool output  Transfuse if hemoglobin less than 7, patient is consented for blood products, type and screen is active  Status post 9/27 IV Venofer  Follow-up a.m. CBC    Diarrhea  Patient reports having diarrhea for the past month, her hematochezia is recent.  Plan to rule out infectious etiology.  CRP within normal limits 1.9.  Patient has not had any diarrhea since 9/24.    Plan:  Follow-up enteric pathogen panel, stool and ova parasite panel.  Acute blood loss anemia  Acute blood loss anemia secondary to lower GI bleed  Troponins normal, BNP normal, no signs of volume overload.  See plan  above  Type 2 diabetes mellitus with stage 4 chronic kidney disease, with long-term current use of insulin (HCC)  Lab Results   Component Value Date    HGBA1C 6.4 (H) 09/24/2024       Recent Labs     09/26/24  1048 09/26/24  1533 09/26/24  2105 09/27/24  0711   POCGLU 88 106 157* 85       Blood Sugar Average: Last 72 hrs:  (P) [id number]  Continue home regimen insulin subcutaneous insulin, SSI, clear liquid diet for now  Stage 5 chronic kidney disease not on chronic dialysis (HCC)  Lab Results   Component Value Date    EGFR 15 09/27/2024    EGFR 14 09/26/2024    EGFR 14 09/25/2024    CREATININE 2.83 (H) 09/27/2024    CREATININE 2.95 (H) 09/26/2024    CREATININE 3.09 (H) 09/25/2024   Patient has CKD stage V, has refused dialysis in the past  She states that she has establish care with palliative  Still produces urine  Baseline creatinine 2.9-3.2, patient is at baseline    Plan:  Follow-up a.m. BMP  Gastroesophageal reflux disease without esophagitis  Patient has history of GERD, is on Protonix 40 mg twice daily at home.  Endorses some right upper quadrant pain after drinking some juice.    Plan:  Continue Protonix 40 mg twice daily  Carafate 1 g 4 times daily  Hypokalemia  9/27, potassium was 3.3.    VTE Pharmacologic Prophylaxis: VTE Score: 4 holding in setting of acute blood loss anemia, SCDs ordered    Mobility:   Basic Mobility Inpatient Raw Score: 20  JH-HLM Goal: 6: Walk 10 steps or more  JH-HLM Achieved: 6: Walk 10 steps or more  JH-HLM Goal achieved. Continue to encourage appropriate mobility.    Patient Centered Rounds: I performed bedside rounds with nursing staff today.   Discussions with Specialists or Other Care Team Provider: GI    Education and Discussions with Family / Patient: Patient declined call to .     Current Length of Stay: 3 day(s)  Current Patient Status: Inpatient   Certification Statement: The patient will continue to require additional inpatient hospital stay due  to GI bleed  Discharge Plan: Anticipate discharge in 48-72 hrs to home.    Code Status: Level 3 - DNAR and DNI    Subjective   Patient was feeling bloated and gassy overnight, simethicone as needed was added.  This morning patient stated that she was continuing to feel bloated, she was passing flatus however had some mild left upper quadrant pain.  She endorsed some fatigue and lightheadedness.  Hemoglobin was 7, and the option to transfuse blood was discussed with patient, patient preferred to try IV iron.    Objective     Vitals:   Temp (24hrs), Av.2 °F (36.8 °C), Min:97.6 °F (36.4 °C), Max:99.1 °F (37.3 °C)    Temp:  [97.6 °F (36.4 °C)-99.1 °F (37.3 °C)] 99.1 °F (37.3 °C)  HR:  [53-59] 59  Resp:  [14-16] 16  BP: (105-154)/(54-70) 154/62  SpO2:  [89 %-96 %] 93 %  There is no height or weight on file to calculate BMI.     Input and Output Summary (last 24 hours):     Intake/Output Summary (Last 24 hours) at 2024 0822  Last data filed at 2024 0013  Gross per 24 hour   Intake 400 ml   Output 500 ml   Net -100 ml       Physical Exam  Vitals reviewed.   Constitutional:       General: She is not in acute distress.     Appearance: Normal appearance. She is not ill-appearing.   HENT:      Head: Normocephalic and atraumatic.      Mouth/Throat:      Mouth: Mucous membranes are moist.   Eyes:      Pupils: Pupils are equal, round, and reactive to light.   Cardiovascular:      Rate and Rhythm: Normal rate and regular rhythm.      Heart sounds: No murmur heard.     No friction rub.   Pulmonary:      Effort: Pulmonary effort is normal. No respiratory distress.      Breath sounds: Normal breath sounds. No wheezing.   Abdominal:      General: Abdomen is flat. There is no distension.      Palpations: Abdomen is soft.      Tenderness: There is abdominal tenderness.      Comments: Mild abdominal tenderness on the left upper and lower quadrants   Skin:     General: Skin is warm and dry.   Neurological:      General: No  focal deficit present.      Mental Status: She is alert and oriented to person, place, and time.          Lines/Drains:  Lines/Drains/Airways       Active Status       None                            Lab Results: I have reviewed the following results:    Results from last 7 days   Lab Units 09/27/24  0507 09/26/24  0806 09/26/24  0510   WBC Thousand/uL 11.27*  --  7.11   HEMOGLOBIN g/dL 7.0*   < > 7.5*   HEMATOCRIT % 22.2*   < > 24.5*   PLATELETS Thousands/uL 266  --  262   SEGS PCT %  --   --  57   LYMPHO PCT %  --   --  31   MONO PCT %  --   --  8   EOS PCT %  --   --  4    < > = values in this interval not displayed.     Results from last 7 days   Lab Units 09/27/24  0507 09/25/24  0506 09/24/24  1110   SODIUM mmol/L 138   < > 136   POTASSIUM mmol/L 3.3*   < > 3.8   CHLORIDE mmol/L 102   < > 102   CO2 mmol/L 27   < > 25   BUN mg/dL 39*   < > 63*   CREATININE mg/dL 2.83*   < > 3.44*   ANION GAP mmol/L 9   < > 9   CALCIUM mg/dL 8.3*   < > 8.8   ALBUMIN g/dL  --   --  3.8   TOTAL BILIRUBIN mg/dL  --   --  0.25   ALK PHOS U/L  --   --  67   ALT U/L  --   --  48   AST U/L  --   --  36   GLUCOSE RANDOM mg/dL 97   < > 145*    < > = values in this interval not displayed.         Results from last 7 days   Lab Units 09/27/24  0711 09/26/24  2105 09/26/24  1533 09/26/24  1048 09/26/24  0652 09/25/24  2123 09/25/24  1614 09/25/24  1058 09/25/24  0702 09/25/24  0303 09/24/24  2107 09/24/24  1853   POC GLUCOSE mg/dl 85 157* 106 88 78 95 110 115 83 71 204* 292*     Results from last 7 days   Lab Units 09/24/24  1110   HEMOGLOBIN A1C % 6.4*           Recent Cultures (last 7 days):         Imaging Review: No pertinent imaging studies reviewed.  Other Studies: EKG was reviewed.     Last 24 Hours Medication List:     Current Facility-Administered Medications:     acetaminophen (TYLENOL) tablet 650 mg, Q6H PRN    amLODIPine (NORVASC) tablet 10 mg, Daily    [Held by provider] aspirin (ECOTRIN LOW STRENGTH) EC tablet 81 mg, Daily     calcium carbonate-vitamin D 500 mg-5 mcg tablet 1 tablet, Daily With Breakfast    Cholecalciferol (VITAMIN D3) tablet 2,000 Units, Daily    co-enzyme Q-10 capsule 100 mg, Daily    escitalopram (LEXAPRO) tablet 10 mg, Daily    fish oil capsule 1,000 mg, Daily    gabapentin (NEURONTIN) capsule 100 mg, TID PRN    hydrALAZINE (APRESOLINE) tablet 25 mg, BID    insulin detemir (LEVEMIR) subcutaneous injection 10 Units, QPM    insulin lispro (HumALOG/ADMELOG) 100 units/mL subcutaneous injection 1-5 Units, 4x Daily (AC & HS) **AND** Fingerstick Glucose (POCT), 4x Daily AC and at bedtime    iron sucrose (VENOFER) 200 mg in sodium chloride 0.9 % 100 mL IVPB, Daily    levothyroxine tablet 100 mcg, Early Morning    metoprolol tartrate (LOPRESSOR) tablet 50 mg, Q12H    pantoprazole (PROTONIX) EC tablet 40 mg, BID    potassium chloride (Klor-Con M20) CR tablet 40 mEq, Once    potassium chloride 40 mEq IVPB (premix), Once    pravastatin (PRAVACHOL) tablet 40 mg, Daily With Dinner    simethicone (MYLICON) chewable tablet 80 mg, Q6H PRN    sucralfate (CARAFATE) tablet 1 g, 4x Daily (AC & HS)    Administrative Statements   Today, Patient Was Seen By: Paris Noble MD      **Please Note: This note may have been constructed using a voice recognition system.**

## 2024-09-28 VITALS
OXYGEN SATURATION: 95 % | HEART RATE: 57 BPM | RESPIRATION RATE: 18 BRPM | DIASTOLIC BLOOD PRESSURE: 61 MMHG | SYSTOLIC BLOOD PRESSURE: 120 MMHG | TEMPERATURE: 99.5 F

## 2024-09-28 LAB
ANION GAP SERPL CALCULATED.3IONS-SCNC: 9 MMOL/L (ref 4–13)
BUN SERPL-MCNC: 33 MG/DL (ref 5–25)
CALCIUM SERPL-MCNC: 8.3 MG/DL (ref 8.4–10.2)
CHLORIDE SERPL-SCNC: 104 MMOL/L (ref 96–108)
CO2 SERPL-SCNC: 25 MMOL/L (ref 21–32)
CREAT SERPL-MCNC: 2.99 MG/DL (ref 0.6–1.3)
ERYTHROCYTE [DISTWIDTH] IN BLOOD BY AUTOMATED COUNT: 14.5 % (ref 11.6–15.1)
GFR SERPL CREATININE-BSD FRML MDRD: 14 ML/MIN/1.73SQ M
GLUCOSE SERPL-MCNC: 114 MG/DL (ref 65–140)
GLUCOSE SERPL-MCNC: 126 MG/DL (ref 65–140)
GLUCOSE SERPL-MCNC: 77 MG/DL (ref 65–140)
HCT VFR BLD AUTO: 23.4 % (ref 34.8–46.1)
HCT VFR BLD AUTO: 25.5 % (ref 34.8–46.1)
HGB BLD-MCNC: 7.1 G/DL (ref 11.5–15.4)
HGB BLD-MCNC: 7.9 G/DL (ref 11.5–15.4)
MAGNESIUM SERPL-MCNC: 1.8 MG/DL (ref 1.9–2.7)
MCH RBC QN AUTO: 26.2 PG (ref 26.8–34.3)
MCHC RBC AUTO-ENTMCNC: 30.3 G/DL (ref 31.4–37.4)
MCV RBC AUTO: 86 FL (ref 82–98)
PLATELET # BLD AUTO: 270 THOUSANDS/UL (ref 149–390)
PMV BLD AUTO: 11.4 FL (ref 8.9–12.7)
POTASSIUM SERPL-SCNC: 3.6 MMOL/L (ref 3.5–5.3)
RBC # BLD AUTO: 2.71 MILLION/UL (ref 3.81–5.12)
SODIUM SERPL-SCNC: 138 MMOL/L (ref 135–147)
WBC # BLD AUTO: 11.59 THOUSAND/UL (ref 4.31–10.16)

## 2024-09-28 PROCEDURE — 99239 HOSP IP/OBS DSCHRG MGMT >30: CPT | Performed by: INTERNAL MEDICINE

## 2024-09-28 PROCEDURE — 82948 REAGENT STRIP/BLOOD GLUCOSE: CPT

## 2024-09-28 PROCEDURE — 85014 HEMATOCRIT: CPT | Performed by: INTERNAL MEDICINE

## 2024-09-28 PROCEDURE — 83735 ASSAY OF MAGNESIUM: CPT | Performed by: INTERNAL MEDICINE

## 2024-09-28 PROCEDURE — 85018 HEMOGLOBIN: CPT | Performed by: INTERNAL MEDICINE

## 2024-09-28 PROCEDURE — 80048 BASIC METABOLIC PNL TOTAL CA: CPT

## 2024-09-28 PROCEDURE — 85027 COMPLETE CBC AUTOMATED: CPT

## 2024-09-28 RX ORDER — SUCRALFATE 1 G/1
1 TABLET ORAL
Qty: 90 TABLET | Refills: 0 | Status: SHIPPED | OUTPATIENT
Start: 2024-09-28

## 2024-09-28 RX ORDER — LIDOCAINE 50 MG/G
1 PATCH TOPICAL DAILY
Status: DISCONTINUED | OUTPATIENT
Start: 2024-09-28 | End: 2024-09-28 | Stop reason: HOSPADM

## 2024-09-28 RX ORDER — GABAPENTIN 100 MG/1
100 CAPSULE ORAL 3 TIMES DAILY PRN
Qty: 30 CAPSULE | Refills: 0 | Status: CANCELLED | OUTPATIENT
Start: 2024-09-28

## 2024-09-28 RX ORDER — PANTOPRAZOLE SODIUM 40 MG/1
40 TABLET, DELAYED RELEASE ORAL 2 TIMES DAILY
Qty: 60 TABLET | Refills: 0 | Status: SHIPPED | OUTPATIENT
Start: 2024-09-28

## 2024-09-28 RX ORDER — MAGNESIUM SULFATE 1 G/100ML
1 INJECTION INTRAVENOUS ONCE
Status: COMPLETED | OUTPATIENT
Start: 2024-09-28 | End: 2024-09-28

## 2024-09-28 RX ADMIN — Medication 2000 UNITS: at 08:50

## 2024-09-28 RX ADMIN — Medication 100 MG: at 08:50

## 2024-09-28 RX ADMIN — OMEGA-3 FATTY ACIDS CAP 1000 MG 1000 MG: 1000 CAP at 08:50

## 2024-09-28 RX ADMIN — SUCRALFATE 1 G: 1 TABLET ORAL at 07:26

## 2024-09-28 RX ADMIN — MAGNESIUM SULFATE HEPTAHYDRATE 1 G: 1 INJECTION, SOLUTION INTRAVENOUS at 11:19

## 2024-09-28 RX ADMIN — LIDOCAINE 1 PATCH: 700 PATCH TOPICAL at 11:19

## 2024-09-28 RX ADMIN — IRON SUCROSE 200 MG: 20 INJECTION, SOLUTION INTRAVENOUS at 08:52

## 2024-09-28 RX ADMIN — AMLODIPINE BESYLATE 10 MG: 10 TABLET ORAL at 08:50

## 2024-09-28 RX ADMIN — ACETAMINOPHEN 325MG 650 MG: 325 TABLET ORAL at 03:43

## 2024-09-28 RX ADMIN — SUCRALFATE 1 G: 1 TABLET ORAL at 12:42

## 2024-09-28 RX ADMIN — PANTOPRAZOLE SODIUM 40 MG: 40 TABLET, DELAYED RELEASE ORAL at 08:50

## 2024-09-28 RX ADMIN — METOPROLOL TARTRATE 50 MG: 50 TABLET, FILM COATED ORAL at 03:47

## 2024-09-28 RX ADMIN — HYDRALAZINE HYDROCHLORIDE 25 MG: 25 TABLET ORAL at 08:50

## 2024-09-28 RX ADMIN — GABAPENTIN 100 MG: 100 CAPSULE ORAL at 03:43

## 2024-09-28 RX ADMIN — ESCITALOPRAM OXALATE 10 MG: 10 TABLET ORAL at 08:50

## 2024-09-28 RX ADMIN — ASPIRIN 81 MG: 81 TABLET, COATED ORAL at 08:50

## 2024-09-28 RX ADMIN — POLYETHYLENE GLYCOL 3350 17 G: 17 POWDER, FOR SOLUTION ORAL at 08:50

## 2024-09-28 RX ADMIN — Medication 1 TABLET: at 07:26

## 2024-09-28 RX ADMIN — LEVOTHYROXINE SODIUM 100 MCG: 100 TABLET ORAL at 05:31

## 2024-09-28 NOTE — ASSESSMENT & PLAN NOTE
Lab Results   Component Value Date    EGFR 14 09/28/2024    EGFR 15 09/27/2024    EGFR 14 09/26/2024    CREATININE 2.99 (H) 09/28/2024    CREATININE 2.83 (H) 09/27/2024    CREATININE 2.95 (H) 09/26/2024   Patient has CKD stage V, has refused dialysis in the past  She states that she has establish care with palliative  Still produces urine  Baseline creatinine 2.9-3.2, patient is at baseline    P

## 2024-09-28 NOTE — ASSESSMENT & PLAN NOTE
Acute blood loss anemia secondary to lower GI bleed  Troponins normal, BNP normal, no signs of volume overload.  Received 2 doses of IV iron infusions  Follow-up with PCP to discuss regarding starting oral iron versus IV iron infusions

## 2024-09-28 NOTE — ASSESSMENT & PLAN NOTE
Patient presenting with over a month worsening shortness of breath with exertion, dizziness with ambulation.  Patient's hemoglobin was 11 as of August, was 8.7 in the ED. Had 2 episodes of maroon-colored stools in the ED, guaiac positive test on [facility].  Patient on arrival was hemodynamically stable  9/25 CT abdomen and pelvis without contrast, no acute findings.  16mm right basilar opacity, likely atelectasis however radiology recommending CT chest follow-up in 2 months.    9/26 patient had bloody bowel movements again.  Notified that patient was actively outputting precious red blood per rectum, approximately 100 cc.  Due to active bleeding, patient was scoped with findings including bleeding Dula Jorden in the ascending colon, same as the one seen on her previous colonoscopy.  Lesion was clipped x 2.    Hemoglobin continue to be in the range of 7-8 after colonoscopy, was offered blood transfusion but patient declined.  Received 2 doses of IV Venofer    Plan:  nonUlcerogenic diet  Outpatient follow-up with PCP  Outpatient follow-up with GI  Protonix 40 mg twice daily  Carafate 1 g 3 times daily

## 2024-09-28 NOTE — ASSESSMENT & PLAN NOTE
Patient has history of GERD, is on Protonix 40 mg twice daily at home.  Endorses some right upper quadrant pain after drinking some juice.    Plan:  Continue Protonix 40 mg twice daily  Carafate 1 g 3 times daily

## 2024-09-28 NOTE — DISCHARGE SUMMARY
Discharge Summary - Hospitalist   Name: Trina Davis 74 y.o. female I MRN: [id number]  Unit/Bed#: S -01 I Date of Admission: 9/24/2024   Date of Service: 9/28/2024 I Hospital Day: 4     Assessment & Plan  Lower GI bleed  Patient presenting with over a month worsening shortness of breath with exertion, dizziness with ambulation.  Patient's hemoglobin was 11 as of August, was 8.7 in the ED. Had 2 episodes of maroon-colored stools in the ED, guaiac positive test on [facility].  Patient on arrival was hemodynamically stable  9/25 CT abdomen and pelvis without contrast, no acute findings.  16mm right basilar opacity, likely atelectasis however radiology recommending CT chest follow-up in 2 months.    9/26 patient had bloody bowel movements again.  Notified that patient was actively outputting precious red blood per rectum, approximately 100 cc.  Due to active bleeding, patient was scoped with findings including bleeding Dula Jorden in the ascending colon, same as the one seen on her previous colonoscopy.  Lesion was clipped x 2.    Hemoglobin continue to be in the range of 7-8 after colonoscopy, was offered blood transfusion but patient declined.  Received 2 doses of IV Venofer    Plan:  nonUlcerogenic diet  Outpatient follow-up with PCP  Outpatient follow-up with GI  Protonix 40 mg twice daily  Carafate 1 g 3 times daily  Acute blood loss anemia  Acute blood loss anemia secondary to lower GI bleed  Troponins normal, BNP normal, no signs of volume overload.  Received 2 doses of IV iron infusions  Follow-up with PCP to discuss regarding starting oral iron versus IV iron infusions  Type 2 diabetes mellitus with stage 4 chronic kidney disease, with long-term current use of insulin ([facility])  Lab Results   Component Value Date    HGBA1C 6.4 (H) 09/24/2024       Recent Labs     09/27/24  1608 09/27/24  2119 09/28/24  0742 09/28/24  1056   POCGLU 132 152* 114 126       Blood Sugar Average: Last 72 hrs:  (P) 108.3543899614531376  Continue  home regimen   Stage 5 chronic kidney disease not on chronic dialysis (HCC)  Lab Results   Component Value Date    EGFR 14 09/28/2024    EGFR 15 09/27/2024    EGFR 14 09/26/2024    CREATININE 2.99 (H) 09/28/2024    CREATININE 2.83 (H) 09/27/2024    CREATININE 2.95 (H) 09/26/2024   Patient has CKD stage V, has refused dialysis in the past  She states that she has establish care with palliative  Still produces urine  Baseline creatinine 2.9-3.2, patient is at baseline    P  Gastroesophageal reflux disease without esophagitis  Patient has history of GERD, is on Protonix 40 mg twice daily at home.  Endorses some right upper quadrant pain after drinking some juice.    Plan:  Continue Protonix 40 mg twice daily  Carafate 1 g 3 times daily     Medical Problems       Resolved Problems  Date Reviewed: 9/23/2024            Resolved    LLQ pain 9/26/2024     Resolved by  Paris Noble MD        Discharging Physician / Practitioner: Sally Sharif MD  PCP: Tree Aguilar MD  Admission Date:   Admission Orders (From admission, onward)       Ordered        09/24/24 1518  INPATIENT ADMISSION  Once                          Discharge Date: 09/28/24    Consultations During Hospital Stay:  Gastroenterology    Procedures Performed:   Colonoscopy-Single small Dieulafoy's lesion in the ascending colon 70 cm from the anal verge; there was spurting blood; placed 2 clips successfully; hemostasis achieved. 5 mL of carbon black was also injected distal to the lesion.  Pedunculated polyp measuring 10-19 mm in the ascending colon; performed hot snare with complete en bloc removal and retrieved specimen; placed 1 clip successfully  One polyp measuring 5-9 mm in the descending colon; performed hot snare removal  Internal hemorrhoids    Significant Findings / Test Results:   Colonoscopy-see as above  CT abdomen pelvis without contrast-No acute findings in the abdomen or pelvis.   16 mm right basilar opacity is likely atelectasis.  Follow-up with CT chest in 2 months.    Incidental Findings:   CT abdomen pelvis without contrast-16 mm right basilar opacity is likely atelectasis. Follow-up with CT chest in 2 months    Test Results Pending at Discharge (will require follow up):   none     Outpatient Tests Requested:  none    Complications:  none    Reason for Admission: Shortness of breath and dizziness    Hospital Course:   Trina Davis is a 74 y.o. female with history of diabetes, COPD, CKD, hemorrhoids,  Dieulafoy lesion AVM, SHAYAN  who originally presented to the hospital on 9/24/2024 due to several days of worsening shortness of breath, dizziness and fatigue.  In the ED patient was hemodynamically stable, but noted to have a hemoglobin drop from baseline of 11 to 8.7.  Also had 2 episodes of maroon-colored stools in the ED, guaiac positive test on [facility]. .  GI team was consulted, colonoscopy was done which showedSingle small Dieulafoy's lesion in the ascending colon 70 cm from the anal verge with spurting blood which was clipped successfully.  Patient's hemoglobin after colonoscopy continued to be within the range of 7-8 .patient was offered blood transfusion but patient denied blood transfusions .Later patient received 2 doses of IV iron infusions during the hospital stay.  At the time of discharge, patient is stable and is being discharged home with recommendations to follow-up with outpatient PCP for initiation of oral iron versus IV iron infusions.        Please see above list of diagnoses and related plan for additional information.     Condition at Discharge: stable    Discharge Day Visit / Exam:   This morning, patient was    Discussion with Family: Patient declined call to .     Discharge instructions/Information to patient and family:   See after visit summary for information provided to patient and family.      Provisions for Follow-Up Care:  See after visit summary for information related to follow-up care and any  pertinent home health orders.      Mobility at time of Discharge:   Basic Mobility Inpatient Raw Score: 22  JH-HLM Goal: 7: Walk 25 feet or more  JH-HLM Achieved: 8: Walk 250 feet ot more  HLM Goal achieved. Continue to encourage appropriate mobility.     Disposition:   Home    Planned Readmission: NO    Discharge Medications:  See after visit summary for reconciled discharge medications provided to patient and/or family.      Administrative Statements   Discharge Statement:  I have spent a total time of 60 minutes in caring for this patient on the day of the visit/encounter. .    **Please Note: This note may have been constructed using a voice recognition system**

## 2024-09-28 NOTE — PLAN OF CARE
Problem: GASTROINTESTINAL - ADULT  Goal: Maintains or returns to baseline bowel function  Description: INTERVENTIONS:  - Assess bowel function  - Encourage oral fluids to ensure adequate hydration  - Administer IV fluids if ordered to ensure adequate hydration  - Administer ordered medications as needed  - Encourage mobilization and activity  - Consider nutritional services referral to assist patient with adequate nutrition and appropriate food choices  Outcome: Progressing  Goal: Establish and maintain optimal ostomy function  Description: INTERVENTIONS:  - Assess bowel function  - Encourage oral fluids to ensure adequate hydration  - Administer IV fluids if ordered to ensure adequate hydration   - Administer ordered medications as needed  - Encourage mobilization and activity  - Nutrition services referral to assist patient with appropriate food choices  - Assess stoma site  - Consider wound care consult   Outcome: Progressing     Problem: HEMATOLOGIC - ADULT  Goal: Maintains hematologic stability  Description: INTERVENTIONS  - Assess for signs and symptoms of bleeding or hemorrhage  - Monitor labs  - Administer supportive blood products/factors as ordered and appropriate  Outcome: Progressing     Problem: Knowledge Deficit  Goal: Patient/family/caregiver demonstrates understanding of disease process, treatment plan, medications, and discharge instructions  Description: Complete learning assessment and assess knowledge base.  Interventions:  - Provide teaching at level of understanding  - Provide teaching via preferred learning methods  Outcome: Progressing

## 2024-09-28 NOTE — ASSESSMENT & PLAN NOTE
Lab Results   Component Value Date    HGBA1C 6.4 (H) 09/24/2024       Recent Labs     09/27/24  1608 09/27/24  2119 09/28/24  0742 09/28/24  1056   POCGLU 132 152* 114 126       Blood Sugar Average: Last 72 hrs:  (P) [id number]  Continue home regimen

## 2024-09-29 ENCOUNTER — HOSPITAL ENCOUNTER (INPATIENT)
Facility: HOSPITAL | Age: 74
LOS: 2 days | Discharge: HOME | DRG: 919 | End: 2024-10-01
Attending: EMERGENCY MEDICINE | Admitting: INTERNAL MEDICINE
Payer: MEDICARE

## 2024-09-29 DIAGNOSIS — D62 ACUTE BLOOD LOSS ANEMIA: ICD-10-CM

## 2024-09-29 DIAGNOSIS — K92.1 HEMATOCHEZIA: ICD-10-CM

## 2024-09-29 DIAGNOSIS — K92.2 LOWER GI BLEED: Primary | ICD-10-CM

## 2024-09-29 DIAGNOSIS — D64.9 SYMPTOMATIC ANEMIA: ICD-10-CM

## 2024-09-29 DIAGNOSIS — K63.81 DIEULAFOY LESION OF COLON: ICD-10-CM

## 2024-09-29 LAB
ABO GROUP BLD: NORMAL
ALBUMIN SERPL BCG-MCNC: 3.7 G/DL (ref 3.5–5)
ALP SERPL-CCNC: 64 U/L (ref 34–104)
ALT SERPL W P-5'-P-CCNC: 23 U/L (ref 7–52)
ANION GAP SERPL CALCULATED.3IONS-SCNC: 8 MMOL/L (ref 4–13)
APTT PPP: 32 SECONDS (ref 23–34)
AST SERPL W P-5'-P-CCNC: 15 U/L (ref 13–39)
BASOPHILS # BLD AUTO: 0.04 THOUSANDS/ÂΜL (ref 0–0.1)
BASOPHILS NFR BLD AUTO: 0 % (ref 0–1)
BILIRUB SERPL-MCNC: 0.27 MG/DL (ref 0.2–1)
BLD GP AB SCN SERPL QL: NEGATIVE
BUN SERPL-MCNC: 35 MG/DL (ref 5–25)
CALCIUM SERPL-MCNC: 9 MG/DL (ref 8.4–10.2)
CHLORIDE SERPL-SCNC: 103 MMOL/L (ref 96–108)
CO2 SERPL-SCNC: 27 MMOL/L (ref 21–32)
CREAT SERPL-MCNC: 3.06 MG/DL (ref 0.6–1.3)
EOSINOPHIL # BLD AUTO: 0.36 THOUSAND/ÂΜL (ref 0–0.61)
EOSINOPHIL NFR BLD AUTO: 4 % (ref 0–6)
ERYTHROCYTE [DISTWIDTH] IN BLOOD BY AUTOMATED COUNT: 14.5 % (ref 11.6–15.1)
GFR SERPL CREATININE-BSD FRML MDRD: 14 ML/MIN/1.73SQ M
GLUCOSE SERPL-MCNC: 170 MG/DL (ref 65–140)
GLUCOSE SERPL-MCNC: 81 MG/DL (ref 65–140)
GLUCOSE SERPL-MCNC: 92 MG/DL (ref 65–140)
HCT VFR BLD AUTO: 23.5 % (ref 34.8–46.1)
HCT VFR BLD AUTO: 25 % (ref 34.8–46.1)
HGB BLD-MCNC: 7.3 G/DL (ref 11.5–15.4)
HGB BLD-MCNC: 7.8 G/DL (ref 11.5–15.4)
IMM GRANULOCYTES # BLD AUTO: 0.13 THOUSAND/UL (ref 0–0.2)
IMM GRANULOCYTES NFR BLD AUTO: 1 % (ref 0–2)
INR PPP: 0.89 (ref 0.85–1.19)
LYMPHOCYTES # BLD AUTO: 2.19 THOUSANDS/ÂΜL (ref 0.6–4.47)
LYMPHOCYTES NFR BLD AUTO: 22 % (ref 14–44)
MAGNESIUM SERPL-MCNC: 2.3 MG/DL (ref 1.9–2.7)
MCH RBC QN AUTO: 27 PG (ref 26.8–34.3)
MCHC RBC AUTO-ENTMCNC: 31.2 G/DL (ref 31.4–37.4)
MCV RBC AUTO: 87 FL (ref 82–98)
MONOCYTES # BLD AUTO: 0.8 THOUSAND/ÂΜL (ref 0.17–1.22)
MONOCYTES NFR BLD AUTO: 8 % (ref 4–12)
NEUTROPHILS # BLD AUTO: 6.52 THOUSANDS/ÂΜL (ref 1.85–7.62)
NEUTS SEG NFR BLD AUTO: 65 % (ref 43–75)
NRBC BLD AUTO-RTO: 0 /100 WBCS
PLATELET # BLD AUTO: 294 THOUSANDS/UL (ref 149–390)
PMV BLD AUTO: 10.7 FL (ref 8.9–12.7)
POTASSIUM SERPL-SCNC: 3.8 MMOL/L (ref 3.5–5.3)
PROT SERPL-MCNC: 6.2 G/DL (ref 6.4–8.4)
PROTHROMBIN TIME: 12.8 SECONDS (ref 12.3–15)
RBC # BLD AUTO: 2.89 MILLION/UL (ref 3.81–5.12)
RH BLD: POSITIVE
SODIUM SERPL-SCNC: 138 MMOL/L (ref 135–147)
SPECIMEN EXPIRATION DATE: NORMAL
WBC # BLD AUTO: 10.04 THOUSAND/UL (ref 4.31–10.16)

## 2024-09-29 PROCEDURE — P9016 RBC LEUKOCYTES REDUCED: HCPCS

## 2024-09-29 PROCEDURE — 30233N1 TRANSFUSION OF NONAUTOLOGOUS RED BLOOD CELLS INTO PERIPHERAL VEIN, PERCUTANEOUS APPROACH: ICD-10-PCS | Performed by: HOSPITALIST

## 2024-09-29 PROCEDURE — 82948 REAGENT STRIP/BLOOD GLUCOSE: CPT

## 2024-09-29 PROCEDURE — 96374 THER/PROPH/DIAG INJ IV PUSH: CPT

## 2024-09-29 PROCEDURE — 99285 EMERGENCY DEPT VISIT HI MDM: CPT | Performed by: EMERGENCY MEDICINE

## 2024-09-29 PROCEDURE — 36415 COLL VENOUS BLD VENIPUNCTURE: CPT | Performed by: EMERGENCY MEDICINE

## 2024-09-29 PROCEDURE — 85014 HEMATOCRIT: CPT

## 2024-09-29 PROCEDURE — 99285 EMERGENCY DEPT VISIT HI MDM: CPT

## 2024-09-29 PROCEDURE — 85610 PROTHROMBIN TIME: CPT | Performed by: EMERGENCY MEDICINE

## 2024-09-29 PROCEDURE — 86900 BLOOD TYPING SEROLOGIC ABO: CPT | Performed by: EMERGENCY MEDICINE

## 2024-09-29 PROCEDURE — 86923 COMPATIBILITY TEST ELECTRIC: CPT

## 2024-09-29 PROCEDURE — 86850 RBC ANTIBODY SCREEN: CPT | Performed by: EMERGENCY MEDICINE

## 2024-09-29 PROCEDURE — 85025 COMPLETE CBC W/AUTO DIFF WBC: CPT | Performed by: EMERGENCY MEDICINE

## 2024-09-29 PROCEDURE — 93005 ELECTROCARDIOGRAM TRACING: CPT

## 2024-09-29 PROCEDURE — 85018 HEMOGLOBIN: CPT

## 2024-09-29 PROCEDURE — 83735 ASSAY OF MAGNESIUM: CPT | Performed by: EMERGENCY MEDICINE

## 2024-09-29 PROCEDURE — 36430 TRANSFUSION BLD/BLD COMPNT: CPT

## 2024-09-29 PROCEDURE — 99223 1ST HOSP IP/OBS HIGH 75: CPT | Performed by: INTERNAL MEDICINE

## 2024-09-29 PROCEDURE — 86901 BLOOD TYPING SEROLOGIC RH(D): CPT | Performed by: EMERGENCY MEDICINE

## 2024-09-29 PROCEDURE — 85730 THROMBOPLASTIN TIME PARTIAL: CPT | Performed by: EMERGENCY MEDICINE

## 2024-09-29 PROCEDURE — 80053 COMPREHEN METABOLIC PANEL: CPT | Performed by: EMERGENCY MEDICINE

## 2024-09-29 RX ORDER — UBIDECARENONE 30 MG
100 CAPSULE ORAL DAILY
Status: DISCONTINUED | OUTPATIENT
Start: 2024-09-29 | End: 2024-10-01 | Stop reason: HOSPADM

## 2024-09-29 RX ORDER — PANTOPRAZOLE SODIUM 40 MG/10ML
40 INJECTION, POWDER, LYOPHILIZED, FOR SOLUTION INTRAVENOUS ONCE
Status: COMPLETED | OUTPATIENT
Start: 2024-09-29 | End: 2024-09-29

## 2024-09-29 RX ORDER — LANOLIN ALCOHOL/MO/W.PET/CERES
2 CREAM (GRAM) TOPICAL
Status: DISCONTINUED | OUTPATIENT
Start: 2024-09-30 | End: 2024-10-01 | Stop reason: HOSPADM

## 2024-09-29 RX ORDER — HYDRALAZINE HYDROCHLORIDE 25 MG/1
25 TABLET, FILM COATED ORAL 2 TIMES DAILY
Status: DISCONTINUED | OUTPATIENT
Start: 2024-09-29 | End: 2024-10-01 | Stop reason: HOSPADM

## 2024-09-29 RX ORDER — SODIUM CHLORIDE, SODIUM LACTATE, POTASSIUM CHLORIDE, CALCIUM CHLORIDE 600; 310; 30; 20 MG/100ML; MG/100ML; MG/100ML; MG/100ML
75 INJECTION, SOLUTION INTRAVENOUS CONTINUOUS
Status: DISPENSED | OUTPATIENT
Start: 2024-09-29 | End: 2024-09-30

## 2024-09-29 RX ORDER — SUCRALFATE 1 G/1
1 TABLET ORAL
Status: DISCONTINUED | OUTPATIENT
Start: 2024-09-29 | End: 2024-10-01 | Stop reason: HOSPADM

## 2024-09-29 RX ORDER — ASPIRIN 81 MG/1
81 TABLET, CHEWABLE ORAL DAILY
Status: CANCELLED | OUTPATIENT
Start: 2024-09-29

## 2024-09-29 RX ORDER — ACETAMINOPHEN 325 MG/1
650 TABLET ORAL EVERY 6 HOURS PRN
Status: DISCONTINUED | OUTPATIENT
Start: 2024-09-29 | End: 2024-09-30

## 2024-09-29 RX ORDER — PANTOPRAZOLE SODIUM 40 MG/1
40 TABLET, DELAYED RELEASE ORAL
Status: DISCONTINUED | OUTPATIENT
Start: 2024-09-29 | End: 2024-10-01 | Stop reason: HOSPADM

## 2024-09-29 RX ORDER — AMLODIPINE BESYLATE 10 MG/1
10 TABLET ORAL DAILY
Status: DISCONTINUED | OUTPATIENT
Start: 2024-09-29 | End: 2024-10-01 | Stop reason: HOSPADM

## 2024-09-29 RX ORDER — INSULIN GLARGINE 100 [IU]/ML
10 INJECTION, SOLUTION SUBCUTANEOUS
Status: DISCONTINUED | OUTPATIENT
Start: 2024-09-29 | End: 2024-10-01 | Stop reason: HOSPADM

## 2024-09-29 RX ORDER — ESCITALOPRAM OXALATE 10 MG/1
10 TABLET ORAL DAILY
Status: DISCONTINUED | OUTPATIENT
Start: 2024-09-29 | End: 2024-09-30

## 2024-09-29 RX ORDER — LEVOTHYROXINE SODIUM 100 UG/1
100 TABLET ORAL
Status: DISCONTINUED | OUTPATIENT
Start: 2024-09-30 | End: 2024-10-01 | Stop reason: HOSPADM

## 2024-09-29 RX ORDER — INSULIN LISPRO 100 [IU]/ML
1-5 INJECTION, SOLUTION INTRAVENOUS; SUBCUTANEOUS
Status: DISCONTINUED | OUTPATIENT
Start: 2024-09-29 | End: 2024-10-01 | Stop reason: HOSPADM

## 2024-09-29 RX ORDER — METOPROLOL TARTRATE 50 MG
50 TABLET ORAL EVERY 12 HOURS
Status: DISCONTINUED | OUTPATIENT
Start: 2024-09-29 | End: 2024-10-01 | Stop reason: HOSPADM

## 2024-09-29 RX ORDER — GABAPENTIN 100 MG/1
100 CAPSULE ORAL 3 TIMES DAILY PRN
Status: DISCONTINUED | OUTPATIENT
Start: 2024-09-29 | End: 2024-09-30

## 2024-09-29 RX ORDER — PRAVASTATIN SODIUM 40 MG
40 TABLET ORAL
Status: DISCONTINUED | OUTPATIENT
Start: 2024-09-29 | End: 2024-10-01 | Stop reason: HOSPADM

## 2024-09-29 RX ADMIN — HYDRALAZINE HYDROCHLORIDE 25 MG: 25 TABLET ORAL at 22:13

## 2024-09-29 RX ADMIN — INSULIN LISPRO 1 UNITS: 100 INJECTION, SOLUTION INTRAVENOUS; SUBCUTANEOUS at 17:56

## 2024-09-29 RX ADMIN — GABAPENTIN 100 MG: 100 CAPSULE ORAL at 20:05

## 2024-09-29 RX ADMIN — PRAVASTATIN SODIUM 40 MG: 40 TABLET ORAL at 16:44

## 2024-09-29 RX ADMIN — SODIUM CHLORIDE, SODIUM LACTATE, POTASSIUM CHLORIDE, AND CALCIUM CHLORIDE 75 ML/HR: .6; .31; .03; .02 INJECTION, SOLUTION INTRAVENOUS at 22:10

## 2024-09-29 RX ADMIN — Medication 100 MG: at 17:56

## 2024-09-29 RX ADMIN — HYDRALAZINE HYDROCHLORIDE 25 MG: 25 TABLET ORAL at 15:17

## 2024-09-29 RX ADMIN — POLYETHYLENE GLYCOL 3350, SODIUM SULFATE ANHYDROUS, SODIUM BICARBONATE, SODIUM CHLORIDE, POTASSIUM CHLORIDE 4000 ML: 236; 22.74; 6.74; 5.86; 2.97 POWDER, FOR SOLUTION ORAL at 16:43

## 2024-09-29 RX ADMIN — METOPROLOL TARTRATE 50 MG: 50 TABLET, FILM COATED ORAL at 15:17

## 2024-09-29 RX ADMIN — SODIUM CHLORIDE, SODIUM LACTATE, POTASSIUM CHLORIDE, AND CALCIUM CHLORIDE 75 ML/HR: .6; .31; .03; .02 INJECTION, SOLUTION INTRAVENOUS at 15:00

## 2024-09-29 RX ADMIN — ESCITALOPRAM OXALATE 10 MG: 10 TABLET ORAL at 15:17

## 2024-09-29 RX ADMIN — PANTOPRAZOLE SODIUM 40 MG: 40 TABLET, DELAYED RELEASE ORAL at 15:17

## 2024-09-29 RX ADMIN — AMLODIPINE BESYLATE 10 MG: 10 TABLET ORAL at 15:17

## 2024-09-29 RX ADMIN — ACETAMINOPHEN 650 MG: 325 TABLET ORAL at 20:05

## 2024-09-29 RX ADMIN — PANTOPRAZOLE SODIUM 40 MG: 40 INJECTION, POWDER, FOR SOLUTION INTRAVENOUS at 11:16

## 2024-09-29 RX ADMIN — SUCRALFATE 1 G: 1 TABLET ORAL at 16:44

## 2024-09-29 NOTE — ED PROVIDER NOTES
Final diagnoses:   Lower GI bleed   Symptomatic anemia     ED Disposition       ED Disposition   Admit    Condition   Stable    Date/Time   Sun Sep 29, 2024 11:23 AM    Comment   Case was discussed with Dr. Velazco and the patient's admission status was agreed to be Admission Status: inpatient status to the service of Dr. Velazco.               Assessment & Plan       Medical Decision Making  74-year-old female with known history of dual Jorden lesions with recent colonoscopy and clip placement presents emergency department with chief complaint of melena as well as lightheadedness and fatigue.  Patient seen and examined noted to be pallorous, nontachycardic, perfusion less than 2 seconds.  Due to known Dula Jorden lesion concern for continued bleeding and anemia.  Will obtain CBC, CMP, magnesium, type and screen.  Laboratory analysis notable for hemoglobin of 7.8, baseline CKD.  Discussed with patient at length given symptoms risk and benefits of blood transfusion.  Patient requesting blood transfusion at this time for symptomatic anemia.  Discussed with GI team plan for possible colonoscopy tomorrow morning.  Discussed with Dr. Velazco (Slim) regarding admission who accepted the patient for further evaluation and management.    Amount and/or Complexity of Data Reviewed  Labs: ordered.             Medications   pantoprazole (PROTONIX) injection 40 mg (40 mg Intravenous Given 9/29/24 1116)       ED Risk Strat Scores                                               History of Present Illness       Chief Complaint   Patient presents with    Rectal Bleeding     Pt rerpots maroon colored blood noted in formed stool this am in depends. Lighteadded, no dizzy. No pain.        Past Medical History:   Diagnosis Date    Actinic keratosis     Acute cystitis without hematuria 04/28/2023    Acute cystitis without hematuria 04/28/2023    Acute respiratory failure with hypoxia (HCC)     Acute-on-chronic kidney injury  (HCC)      "NIKOS (acute kidney injury) (HCC) 05/08/2020    Allergic     Allergic rhinitis     Ambulatory dysfunction 10/22/2020    AMS (altered mental status) 07/14/2020    Anesthesia     pt reports \"had to use double lumen endo tube for hiatal hernia repair/so surgeon could get to where he needed to work\"    Arthritis     Aspiration into airway     Michael esophagus 1993    Lot of stress with children    Basal cell carcinoma 2007    left cheek     BCC (basal cell carcinoma) 05/27/2021    Left Nasal tip    Breast discharge 06/19/2023    Breast pain 06/19/2023    Cancer (HCC)     squamous cell cancer on forhead    Cancer (HCC)     basal cell on nose     Cholelithiasis     Chronic kidney disease 2000, 2018    Stones, kidney disease stage 4    Chronic pain disorder     bilat feet and joint pain on occas    Colon polyp     Confusion 10/30/2023    Constipation     COPD (chronic obstructive pulmonary disease) (HCC)     COVID-19 08/2021    recovered at home/did receive monoclonal infusion    COVID-19 virus infection 08/16/2021    Dental bridge present     Depression     Diabetes mellitus (HCC)     Type 2    Diabetic neuropathy (HCC)     Difficulty walking 2017    Disease of thyroid gland     Dyspnea     Elevated liver enzymes 04/04/2023    Epigastric abdominal pain with severe diabetic gastroparesis, status post EGD/Botox injection, 5/14 05/17/2021    Facial abrasion, initial encounter 03/22/2023    Fall 03/22/2023    Family history of thyroid problem     Fatty liver     Fibromyalgia, primary     Fracture of orbital floor, blow-out, right, closed, with routine healing, subsequent encounter 03/22/2023    GERD (gastroesophageal reflux disease)     Heart burn     Hiatal hernia     History of cholecystectomy 10/27/2023    History of colon polyps 07/30/2021    History of kidney stones 09/29/2020    Hx of recurrent kidney stones    History of kidney stones 09/29/2020    Hx of recurrent kidney stones  Formatting of this note might be " different from the original. Hx of recurrent kidney stones  Last Assessment & Plan:  Formatting of this note might be different from the original. We will set her up for follow-up in 3 months with a KUB prior.  She knows to call if she has any kidney stone type pain in the meantime.    History of Nissen fundoplication 10/27/2023    History of pneumonia     History of repair of hiatal hernia 05/03/2020    Hyperlipidemia     Hyperplasia, parathyroid (HCC)     Hypertension     Hypertensive urgency 10/27/2023    Hyponatremia 04/26/2023    Hypotension 04/13/2022    Kidney problem     Kidney stone     Left hip pain 10/05/2023    Leukocytosis 07/14/2020    Memory loss Julu2 2020    Motion sickness     Motion sickness     Multiple closed fractures of ribs of right side 03/22/2023    Nasal bones, closed fracture 03/22/2023    Neck pain     Obesity 1978    Obesity (BMI 30.0-34.9)     Other chest pain 09/13/2021    Other fatigue 09/21/2023    Peripheral neuropathy     Pollen allergies     Preoperative clearance 06/27/2019    RLS (restless legs syndrome)     S/P foot surgery 07/20/2022    SCC (squamous cell carcinoma) 05/04/2021    left mid forehead    SCC (squamous cell carcinoma) 2023    chest    Seasonal allergies     Shingles 01 05 23    Sleep apnea     has inspire    Squamous cell skin cancer 2007    left cheek     Stroke (HCC)     Swollen ankles     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, left     great toe fusion    Urinary incontinence     Urinary tract infection 03/28/2022    Wears glasses       Past Surgical History:   Procedure Laterality Date    ABDOMINAL SURGERY  1997,2015,1972    Nissen x2 1972 tubal ligarion    ARTHROSCOPY KNEE Right     BREAST BIOPSY      stereotactic-benign    BREAST BIOPSY      stereo-benign    BREAST CYST EXCISION Bilateral     benign    BREAST EXCISIONAL BIOPSY      unknown date-benign    BREAST EXCISIONAL BIOPSY      unknown date-benign    BREAST EXCISIONAL BIOPSY      unknown date-benign     BREAST EXCISIONAL BIOPSY      unknown age-benign    CARDIAC CATHETERIZATION      CATARACT EXTRACTION Right     CHOLECYSTECTOMY      COLONOSCOPY      EXAMINATION UNDER ANESTHESIA N/A 06/24/2021    Procedure: EXAM UNDER ANESTHESIA (EUA), DISE;  Surgeon: Gregory Maier MD;  Location: [facility];  Service: ENT    HERNIA REPAIR      hiatal    HIATAL HERNIA REPAIR      KNEE SURGERY Right     Torn maniscus lap surg    LIPOSUCTION      LYMPH NODE BIOPSY      MOHS SURGERY  05/20/2021    left mid forehead-Mickey    MOHS SURGERY Left 05/27/2021    Left nasal tip- mickey     SC LIGATION/BIOPSY TEMPORAL ARTERY Left 01/05/2023    Procedure: BIOPSY ARTERY TEMPORAL;  Surgeon: Alec Dennis DO;  Location: [facility];  Service: Vascular    SC OPEN IMPLANTATION CRANIAL NERVE BOOM & PULSE GEN N/A 11/10/2021    Procedure: INSERTION UPPER AIRWAY STIMULATOR, INSPIRE IMPLANT;  Surgeon: Gregory Maier MD;  Location: [facility];  Service: ENT    SC UNLISTED PROCEDURE FOOT/TOES Right 07/19/2022    Procedure: 1st metatarsal phalangeal joint fusion;  Surgeon: Dede Bonner DPM;  Location: [facility];  Service: Podiatry    REDUCTION MAMMAPLASTY Bilateral 2000    REDUCTION MAMMAPLASTY      SKIN BIOPSY      SKIN CANCER EXCISION  2007    squamous cell carcinoma     SKIN CANCER EXCISION  2007    basal cell carcinoma    SKIN CANCER EXCISION  2023    SCCIS chest    SQUAMOUS CELL CARCINOMA EXCISION      TOE SURGERY Right 08/04/2022    Right Great Toe Fusion    TONSILLECTOMY      TUBAL LIGATION      UPPER GASTROINTESTINAL ENDOSCOPY      US GUIDED BREAST BIOPSY RIGHT COMPLETE Right 07/18/2022      Family History   Problem Relation Age of Onset    Heart disease Mother     Depression Mother     Hypertension Mother     COPD Mother     Hearing loss Mother     Anxiety disorder Mother     Heart disease Father     Lung cancer Father 70        Smoker     Cancer Father         brain    Alcohol abuse Father     Dementia Father     Hypertension Father      Thyroid disease Father     COPD Father     Arthritis Father     Brain cancer Father 74    Hypertension Sister     Diabetes Sister     Heart disease Sister     Thyroid disease Sister     Cancer Sister         Lympoma, lung    Lung cancer Sister 79    Anxiety disorder Sister     Migraines Sister     Hypertension Brother     Diabetes Brother     Cancer Brother         Throat    Dementia Brother     Stroke Brother     Hypertension Brother     Heart disease Brother     Diabetes Brother     Hypertension Brother     Allergies Brother     Brain cancer Paternal Aunt         unknown age    No Known Problems Son     No Known Problems Son     Breast cancer Neg Hx       Social History     Tobacco Use    Smoking status: Former     Current packs/day: 0.00     Average packs/day: 2.0 packs/day for 10.0 years (20.0 ttl pk-yrs)     Types: Cigarettes     Start date: 1966     Quit date: 1976     Years since quittin.7     Passive exposure: Past    Smokeless tobacco: Never    Tobacco comments:     Smoked 2 pack a day   Vaping Use    Vaping status: Never Used   Substance Use Topics    Alcohol use: Yes     Comment: 1 or 2 a year    Drug use: No      E-Cigarette/Vaping    E-Cigarette Use Never User       E-Cigarette/Vaping Substances    Nicotine No     THC No     CBD No     Flavoring No     Other No     Unknown No       I have reviewed and agree with the history as documented.     (Trina Davis) Trina Davis is a 74 y.o. female     They presented to the emergency department on 2024. Patient presents with:  Rectal Bleeding: Pt rerpots maroon colored blood noted in formed stool this am in depends. Lighteadded, no dizzy. No pain.   .    The patient states that she was discharged yesterday after having colonoscopy which found multiple areas of bleeding.  At that time patient was offered blood transfusion, however elected to proceed with iron infusion.  Patient states that she has been having melanotic stools with  occasional blood-streaked stools and overall fatigue that has worsened since being discharged.  Patient notes that she also becomes lightheaded with exertion.  Due to her continued symptoms she presented to the emergency department again for evaluation. Patient denies fever, chills, chest pain, difficulty breathing, abdominal pain, change in urination, focal weakness, numbness, or any other complaint at this time.              Review of Systems   Constitutional:  Positive for appetite change. Negative for chills and fever.   HENT:  Negative for ear pain and sore throat.    Eyes:  Negative for pain and visual disturbance.   Respiratory:  Negative for cough and shortness of breath.    Cardiovascular:  Negative for chest pain and palpitations.   Gastrointestinal:  Positive for blood in stool. Negative for abdominal pain and vomiting.   Genitourinary:  Negative for dysuria and hematuria.   Musculoskeletal:  Negative for arthralgias and back pain.   Skin:  Negative for color change and rash.   Neurological:  Positive for light-headedness. Negative for seizures and syncope.   All other systems reviewed and are negative.          Objective       ED Triage Vitals [09/29/24 0818]   Temperature Pulse Blood Pressure Respirations SpO2 Patient Position - Orthostatic VS   97.7 °F (36.5 °C) 57 146/70 18 96 % Lying      Temp Source Heart Rate Source BP Location FiO2 (%) Pain Score    Oral Monitor Right arm -- --      Vitals      Date and Time Temp Pulse SpO2 Resp BP Pain Score FACES Pain Rating User   09/29/24 1100 -- 56 95 % 18 137/66 -- -- MO   09/29/24 1025 97.8 °F (36.6 °C) 55 95 % 18 132/65 -- -- K   09/29/24 1013 98.1 °F (36.7 °C) 56 -- 18 132/68 -- -- RJK   09/29/24 0945 -- 55 94 % 18 149/67 -- -- MO   09/29/24 0845 -- 57 97 % 18 148/65 -- -- [facility]   09/29/24 0818 97.7 °F (36.5 °C) 57 96 % 18 146/70 -- -- RJK            Physical Exam  Vitals and nursing note reviewed.   Constitutional:       General: She is not in acute  distress.     Appearance: Normal appearance.   HENT:      Head: Normocephalic and atraumatic.      Right Ear: External ear normal.      Left Ear: External ear normal.      Nose: Nose normal.      Mouth/Throat:      Mouth: Mucous membranes are moist.   Eyes:      Conjunctiva/sclera: Conjunctivae normal.   Cardiovascular:      Rate and Rhythm: Normal rate and regular rhythm.   Pulmonary:      Effort: Pulmonary effort is normal. No respiratory distress.      Breath sounds: Normal breath sounds.   Abdominal:      General: Abdomen is flat. Bowel sounds are normal.      Tenderness: There is no abdominal tenderness. There is no guarding or rebound.   Musculoskeletal:         General: Normal range of motion.      Cervical back: Normal range of motion.   Skin:     General: Skin is warm and dry.      Capillary Refill: Capillary refill takes less than 2 seconds.      Coloration: Skin is pale.   Neurological:      Mental Status: She is alert. Mental status is at baseline.   Psychiatric:         Mood and Affect: Mood normal.         Results Reviewed       Procedure Component Value Units Date/Time    Magnesium [[id number]]  (Normal) Collected: 09/29/24 0826    Lab Status: Final result Specimen: Blood from Arm, Right Updated: 09/29/24 0900     Magnesium 2.3 mg/dL     Comprehensive metabolic panel [[id number]]  (Abnormal) Collected: 09/29/24 0826    Lab Status: Final result Specimen: Blood from Arm, Right Updated: 09/29/24 0848     Sodium 138 mmol/L      Potassium 3.8 mmol/L      Chloride 103 mmol/L      CO2 27 mmol/L      ANION GAP 8 mmol/L      BUN 35 mg/dL      Creatinine 3.06 mg/dL      Glucose 81 mg/dL      Calcium 9.0 mg/dL      AST 15 U/L      ALT 23 U/L      Alkaline Phosphatase 64 U/L      Total Protein 6.2 g/dL      Albumin 3.7 g/dL      Total Bilirubin 0.27 mg/dL      eGFR 14 ml/min/1.73sq m     Narrative:      National Kid[employer] Foundation guidelines for Chronic Kidney Disease (CKD):     Stage 1 with normal or high  GFR (GFR > 90 mL/min/1.73 square meters)    Stage 2 Mild CKD (GFR = 60-89 mL/min/1.73 square meters)    Stage 3A Moderate CKD (GFR = 45-59 mL/min/1.73 square meters)    Stage 3B Moderate CKD (GFR = 30-44 mL/min/1.73 square meters)    Stage 4 Severe CKD (GFR = 15-29 mL/min/1.73 square meters)    Stage 5 End Stage CKD (GFR <15 mL/min/1.73 square meters)  Note: GFR calculation is accurate only with a steady state creatinine    Protime-INR [[id number]]  (Normal) Collected: 09/29/24 0826    Lab Status: Final result Specimen: Blood from Arm, Right Updated: 09/29/24 0845     Protime 12.8 seconds      INR 0.89    Narrative:      INR Therapeutic Range    Indication                                             INR Range      Atrial Fibrillation                                               2.0-3.0  Hypercoagulable State                                    2.0.2.3  Left Ventricular Asist Device                            2.0-3.0  Mechanical Heart Valve                                  -    Aortic(with afib, MI, embolism, HF, LA enlargement,    and/or coagulopathy)                                     2.0-3.0 (2.5-3.5)     Mitral                                                             2.5-3.5  Prosthetic/Bioprosthetic Heart Valve               2.0-3.0  Venous thromboembolism (VTE: VT, PE        2.0-3.0    APTT [[id number]]  (Normal) Collected: 09/29/24 0826    Lab Status: Final result Specimen: Blood from Arm, Right Updated: 09/29/24 0845     PTT 32 seconds     CBC and differential [[id number]]  (Abnormal) Collected: 09/29/24 0826    Lab Status: Final result Specimen: Blood from Arm, Right Updated: 09/29/24 0832     WBC 10.04 Thousand/uL      RBC 2.89 Million/uL      Hemoglobin 7.8 g/dL      Hematocrit 25.0 %      MCV 87 fL      MCH 27.0 pg      MCHC 31.2 g/dL      RDW 14.5 %      MPV 10.7 fL      Platelets 294 Thousands/uL      nRBC 0 /100 WBCs      Segmented % 65 %      Immature Grans % 1 %      Lymphocytes % 22 %       Monocytes % 8 %      Eosinophils Relative 4 %      Basophils Relative 0 %      Absolute Neutrophils 6.52 Thousands/µL      Absolute Immature Grans 0.13 Thousand/uL      Absolute Lymphocytes 2.19 Thousands/µL      Absolute Monocytes 0.80 Thousand/µL      Eosinophils Absolute 0.36 Thousand/µL      Basophils Absolute 0.04 Thousands/µL             No orders to display       Procedures    ED Medication and Procedure Management   Prior to Admission Medications   Prescriptions Last Dose Informant Patient Reported? Taking?   B-D ULTRAFINE III SHORT PEN 31G X 8 MM MISC  Self, Spouse/Significant Other Yes No   Calcium Citrate 250 MG TABS  Self No No   Sig: Take 2 tablets (500 mg total) by mouth in the morning   Coenzyme Q10 (CoQ10) 100 MG CAPS  Self, Spouse/Significant Other Yes No   Sig: Take 100 mg by mouth in the morning Bed time   Cyanocobalamin (Vitamin B12) 1000 MCG TBCR  Self Yes No   Sig: Take 1,000 mcg by mouth once a week   Icosapent Ethyl (Vascepa) 1 g CAPS   No No   Sig: Take 2 capsules (2 g total) by mouth 2 (two) times a day   Probiotic Product (PROBIOTIC BLEND PO)  Self Yes No   Sig: Take by mouth   Vitamin D, Cholecalciferol, 25 MCG (1000 UT) TABS  Self Yes No   Sig: Take 2,000 Units by mouth in the morning   acetaminophen (TYLENOL) 500 mg tablet  Self Yes No   Sig: Take 1,000 mg by mouth every 6 (six) hours as needed for mild pain   albuterol (Ventolin HFA) 90 mcg/act inhaler  Self, Spouse/Significant Other No No   Sig: Inhale 2 puffs every 6 (six) hours as needed for wheezing   Patient not taking: Reported on 9/23/2024   amLODIPine (NORVASC) 5 mg tablet  Self No No   Sig: Take 2 tablets (10 mg total) by mouth daily   aspirin (ECOTRIN LOW STRENGTH) 81 mg EC tablet  Self, Spouse/Significant Other No No   Sig: Take 1 tablet (81 mg total) by mouth daily Do not start before October 31, 2023.   escitalopram (LEXAPRO) 10 mg tablet  Self No No   Sig: TAKE 1 TABLET BY MOUTH EVERY DAY   hydrALAZINE (APRESOLINE) 25 mg  tablet  Self No No   Sig: Take 1 tablet (25 mg total) by mouth 2 (two) times a day   insulin aspart (NovoLOG) 100 units/mL injection  Self, Spouse/Significant Other Yes No   Sig: Inject under the skin 3 (three) times a day before meals 5units, 5units, 12units.   insulin detemir (Levemir FlexTouch) 100 Units/mL injection pen  Self, Spouse/Significant Other Yes No   Sig: Inject 20 Units under the skin every evening   levothyroxine 100 mcg tablet  Self No No   Sig: TAKE 1 TABLET BY MOUTH EVERY DAY   methenamine hippurate (HIPREX) 1 g tablet  Self, Spouse/Significant Other No No   Sig: Take 1 tablet (1 g total) by mouth 2 (two) times a day with meals   metoprolol tartrate (LOPRESSOR) 50 mg tablet   No No   Sig: Take 1 tablet (50 mg total) by mouth every 12 (twelve) hours   pantoprazole (PROTONIX) 40 mg tablet   No No   Sig: Take 1 tablet (40 mg total) by mouth 2 (two) times a day   pramipexole (MIRAPEX) 0.25 mg tablet  Self No No   Sig: TAKE 1 TABLET (0.25 MG TOTAL) BY MOUTH DAILY AT BEDTIME   rosuvastatin (CRESTOR) 5 mg tablet  Self, Spouse/Significant Other No No   Sig: Take 1 tablet (5 mg total) by mouth daily   sucralfate (CARAFATE) 1 g tablet   No No   Sig: Take 1 tablet (1 g total) by mouth 3 (three) times a day with meals   torsemide (DEMADEX) 10 mg tablet  Self No No   Sig: TAKE 10 MG. DAILY EXCEPT MONDAY, WEDNESDAY, FRIDAY 20 MG.      Facility-Administered Medications: None     Patient's Medications   Discharge Prescriptions    No medications on file     No discharge procedures on file.  ED SEPSIS DOCUMENTATION   Time reflects when diagnosis was documented in both MDM as applicable and the Disposition within this note       Time User Action Codes Description Comment    9/29/2024 10:54 AM Roney Sharp [K92.2] Lower GI bleed     9/29/2024 11:23 AM Roney Sharp [D64.9] Symptomatic anemia                  Roney Sharp MD  09/29/24 0457

## 2024-09-29 NOTE — ASSESSMENT & PLAN NOTE
"Lab Results   Component Value Date    HGB 7.8 (L) 09/29/2024    HGB 7.9 (L) 09/28/2024    HGB 7.1 (L) 09/28/2024    HGB 7.4 (L) 09/27/2024    HGB 7.0 (L) 09/27/2024    HGB 8.2 (L) 09/26/2024    HGB 7.7 (L) 09/26/2024    HGB 7.5 (L) 09/26/2024    HGB 7.8 (L) 09/25/2024    HGB 9.3 (L) 09/25/2024     Patient received 2 Venofer infusions during her last hospitalization, and on discharge her hemoglobin was stable at 7.9  She returned to the ED today after she noticed overnight that she had \"sweet potato-colored stools\" with visible blood on wiping.  This morning, she noticed bright red blood in her depends overnight, and was fatigued  She was noted to have another bloody bowel movement with bright red blood per rectum noted after admission  She received 1 unit of packed RBCs and appeared to have less pallor post-infusion  Gastroenterology was consulted and will be repeating a colonoscopy tomorrow morning.  They we will consider surgical consultation after    Plan:  Monitor H&H every 8 hours, transfuse as needed  Clear liquid diet with no red-colored foods, n.p.o. at midnight  Follow-up GI recommendations  "

## 2024-09-29 NOTE — ASSESSMENT & PLAN NOTE
-Clipped during colonoscopy on August 30, 2024  -Clipped again during colonoscopy on September 26, 2024  -Will plan repeat colonoscopy on Monday, September 30, 2024  -May need surgical evaluation due to recurrent bleeding

## 2024-09-29 NOTE — ASSESSMENT & PLAN NOTE
Lab Results   Component Value Date    EGFR 14 09/29/2024    EGFR 14 09/28/2024    EGFR 15 09/27/2024    CREATININE 3.06 (H) 09/29/2024    CREATININE 2.99 (H) 09/28/2024    CREATININE 2.83 (H) 09/27/2024     Patient has a history of anemia, with a baseline hemoglobin in the 11's (as of early August)  More recently, patient's hemoglobin has dropped to the 7s and 8s with concomitant fatigue and weakness, likely secondary to GI bleed  Creatinine is stable near baseline of 3  During her last admission, she received Venofer infusions  In the ED, she was started on 1 unit of packed RBCs

## 2024-09-29 NOTE — ASSESSMENT & PLAN NOTE
Lab Results   Component Value Date    HGBA1C 6.4 (H) 09/24/2024       Recent Labs     09/27/24  1608 09/27/24  2119 09/28/24  0742 09/28/24  1056   POCGLU 132 152* 114 126       Blood Sugar Average: Last 72 hrs:    Blood glucose has been stable around the 110s to 120s  Previously, patient was on 5 units of mealtime insulin at breakfast and lunch, and 12 units of mealtime insulin at dinnertime. She was taking insulin detemir 20 units at bedtime  During her previous hospitalization, her blood sugars were well-controlled and she was taken off mealtime insulin, placed on 10 units of long-acting insulin at bedtime, and placed on sliding scale insulin with good control    Plan:  Continue sliding scale insulin, 10 units of long-acting insulin at bedtime  Monitor blood sugars

## 2024-09-29 NOTE — H&P
"H&P - Hospitalist   Name: Trina Davis 74 y.o. female I MRN: [id number]  Unit/Bed#: S -01 I Date of Admission: 9/29/2024   Date of Service: 9/29/2024 I Hospital Day: 0     Assessment & Plan  Acute blood loss anemia  Lab Results   Component Value Date    HGB 7.8 (L) 09/29/2024    HGB 7.9 (L) 09/28/2024    HGB 7.1 (L) 09/28/2024    HGB 7.4 (L) 09/27/2024    HGB 7.0 (L) 09/27/2024    HGB 8.2 (L) 09/26/2024    HGB 7.7 (L) 09/26/2024    HGB 7.5 (L) 09/26/2024    HGB 7.8 (L) 09/25/2024    HGB 9.3 (L) 09/25/2024     Patient received 2 Venofer infusions during her last hospitalization, and on discharge her hemoglobin was stable at 7.9  She returned to the ED today after she noticed overnight that she had \"sweet potato-colored stools\" with visible blood on wiping.  This morning, she noticed bright red blood in her depends overnight, and was fatigued  She was noted to have another bloody bowel movement with bright red blood per rectum noted after admission  She received 1 unit of packed RBCs and appeared to have less pallor post-infusion  Gastroenterology was consulted and will be repeating a colonoscopy tomorrow morning.  They we will consider surgical consultation after    Plan:  Monitor H&H every 8 hours, transfuse as needed  Clear liquid diet with no red-colored foods, n.p.o. at midnight  Follow-up GI recommendations  Hematochezia  Patient notes that last night, she noticed a bloody bowel movement and blood on the toilet paper after wiping  Overnight, she noticed some blood in her depends with stool and endorsed feeling lightheaded and fatigued  See primary problem for more information    Plan:  Serial H&H every 8 hours  Follow-up GI recommendations  Anemia in stage 5 chronic kidney disease, not on chronic dialysis  (HCC)  Lab Results   Component Value Date    EGFR 14 09/29/2024    EGFR 14 09/28/2024    EGFR 15 09/27/2024    CREATININE 3.06 (H) 09/29/2024    CREATININE 2.99 (H) 09/28/2024    CREATININE 2.83 (H) " 09/27/2024     Patient has a history of anemia, with a baseline hemoglobin in the 11's (as of early August)  More recently, patient's hemoglobin has dropped to the 7s and 8s with concomitant fatigue and weakness, likely secondary to GI bleed  Creatinine is stable near baseline of 3  During her last admission, she received Venofer infusions  In the ED, she was started on 1 unit of packed RBCs  Dieulafoy lesion of colon  With her previous 2 admissions, she was noted to have 2 separate dieulafoy lesions of the colon, both of which were clipped  At the time of discharge, her bloody bowel movements had improved, but returned the night of and into today  See primary problem for more information    Plan:  Will follow-up GI recommendations after colonoscopy  Gastroesophageal reflux disease without esophagitis  Stable on pantoprazole 40 mg BID  Received one dose of pantoprazole 40 mg IV in the ED    Plan:  Continue pantoprazole 40 mg BID  Type 2 diabetes mellitus (HCC)  Lab Results   Component Value Date    HGBA1C 6.4 (H) 09/24/2024       Recent Labs     09/27/24  1608 09/27/24  2119 09/28/24  0742 09/28/24  1056   POCGLU 132 152* 114 126       Blood Sugar Average: Last 72 hrs:    Blood glucose has been stable around the 110s to 120s  Previously, patient was on 5 units of mealtime insulin at breakfast and lunch, and 12 units of mealtime insulin at dinnertime. She was taking insulin detemir 20 units at bedtime  During her previous hospitalization, her blood sugars were well-controlled and she was taken off mealtime insulin, placed on 10 units of long-acting insulin at bedtime, and placed on sliding scale insulin with good control    Plan:  Continue sliding scale insulin, 10 units of long-acting insulin at bedtime  Monitor blood sugars      VTE Pharmacologic Prophylaxis: VTE Score: 5 High Risk (Score >/= 5) - Pharmacological DVT Prophylaxis Contraindicated. Sequential Compression Devices Ordered.  Code Status: Level 3 - DNAR  and DNI   Discussion with family: Patient declined call to .     Anticipated Length of Stay: Patient will be admitted on an inpatient basis with an anticipated length of stay of greater than 2 midnights secondary to acute-on-chronic anemia with hematochezia.    History of Present Illness   Chief Complaint: hematochezia    Trina Davis is a 74 y.o. female with a PMH of stage V CKD not on dialysis, T2DM, history of CVA in 2023, hyperparathyroidism, vascular dementia, COPD, GERD, and Dieulafoy colonic lesions who presents with bright red blood per rectum. She notes that after she was discharged yesterday, she had a bright red, orange-marleni, soft bowel movement at night. Overnight, she noticed some bloody stools in her depends, and endorsed worsening lightheadedness, fatigue, and pallor.    Labs indicated a hemoglobin of 7.8 (decreased from discharge of 7.9), a creatinine of 3.06 (at baseline), and normal magnesium. ECG showed nonspecific changes.  Gastroenterology was consulted, and are planning for a repeat colonoscopy tomorrow morning. She then received 40 mg of pantoprazole IV and 1 unit of packed RBCs. On evaluation, her pallor seem to have improved post-infusion.    Review of Systems   Constitutional:  Positive for fatigue. Negative for appetite change, fever and unexpected weight change.   Respiratory:  Positive for shortness of breath (mild). Negative for cough and wheezing.    Cardiovascular:  Negative for chest pain, palpitations and leg swelling.   Gastrointestinal:  Positive for blood in stool. Negative for abdominal pain, constipation, diarrhea, nausea and vomiting.   Genitourinary:  Negative for pelvic pain.   Musculoskeletal:  Positive for arthralgias. Negative for myalgias.   Neurological:  Positive for weakness and light-headedness. Negative for tremors, numbness and headaches.       I have reviewed the patient's PMH, PSH, Social History, Family History, Meds, and Allergies  Historical  "Information   Past Medical History:   Diagnosis Date    Actinic keratosis     Acute cystitis without hematuria 04/28/2023    Acute cystitis without hematuria 04/28/2023    Acute respiratory failure with hypoxia (HCC)     Acute-on-chronic kidney injury  (HCC)     NIKOS (acute kidney injury) (HCC) 05/08/2020    Allergic     Allergic rhinitis     Ambulatory dysfunction 10/22/2020    AMS (altered mental status) 07/14/2020    Anesthesia     pt reports \"had to use double lumen endo tube for hiatal hernia repair/so surgeon could get to where he needed to work\"    Arthritis     Aspiration into airway     Michael esophagus 1993    Lot of stress with children    Basal cell carcinoma 2007    left cheek     BCC (basal cell carcinoma) 05/27/2021    Left Nasal tip    Breast discharge 06/19/2023    Breast pain 06/19/2023    Cancer (HCC)     squamous cell cancer on forhead    Cancer (HCC)     basal cell on nose     Cholelithiasis     Chronic kidney disease 2000, 2018    Stones, kidney disease stage 4    Chronic pain disorder     bilat feet and joint pain on occas    Colon polyp     Confusion 10/30/2023    Constipation     COPD (chronic obstructive pulmonary disease) (HCC)     COVID-19 08/2021    recovered at home/did receive monoclonal infusion    COVID-19 virus infection 08/16/2021    Dental bridge present     Depression     Diabetes mellitus (HCC)     Type 2    Diabetic neuropathy (HCC)     Difficulty walking 2017    Disease of thyroid gland     Dyspnea     Elevated liver enzymes 04/04/2023    Epigastric abdominal pain with severe diabetic gastroparesis, status post EGD/Botox injection, 5/14 05/17/2021    Facial abrasion, initial encounter 03/22/2023    Fall 03/22/2023    Family history of thyroid problem     Fatty liver     Fibromyalgia, primary     Fracture of orbital floor, blow-out, right, closed, with routine healing, subsequent encounter 03/22/2023    GERD (gastroesophageal reflux disease)     Heart burn     Hiatal hernia     " History of cholecystectomy 10/27/2023    History of colon polyps 07/30/2021    History of kidney stones 09/29/2020    Hx of recurrent kidney stones    History of kidney stones 09/29/2020    Hx of recurrent kidney stones  Formatting of this note might be different from the original. Hx of recurrent kidney stones  Last Assessment & Plan:  Formatting of this note might be different from the original. We will set her up for follow-up in 3 months with a KUB prior.  She knows to call if she has any kidney stone type pain in the meantime.    History of Nissen fundoplication 10/27/2023    History of pneumonia     History of repair of hiatal hernia 05/03/2020    Hyperlipidemia     Hyperplasia, parathyroid (HCC)     Hypertension     Hypertensive urgency 10/27/2023    Hyponatremia 04/26/2023    Hypotension 04/13/2022    Kidney problem     Kidney stone     Left hip pain 10/05/2023    Leukocytosis 07/14/2020    Memory loss Julu2 2020    Motion sickness     Motion sickness     Multiple closed fractures of ribs of right side 03/22/2023    Nasal bones, closed fracture 03/22/2023    Neck pain     Obesity 1978    Obesity (BMI 30.0-34.9)     Other chest pain 09/13/2021    Other fatigue 09/21/2023    Peripheral neuropathy     Pollen allergies     Preoperative clearance 06/27/2019    RLS (restless legs syndrome)     S/P foot surgery 07/20/2022    SCC (squamous cell carcinoma) 05/04/2021    left mid forehead    SCC (squamous cell carcinoma) 2023    chest    Seasonal allergies     Shingles 01 05 23    Sleep apnea     has inspire    Squamous cell skin cancer 2007    left cheek     Stroke (HCC)     Swollen ankles     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, left     great toe fusion    Urinary incontinence     Urinary tract infection 03/28/2022    Wears glasses      Past Surgical History:   Procedure Laterality Date    ABDOMINAL SURGERY  1997,2015,1972    Nissen x2 1972 tubal ligarion    ARTHROSCOPY KNEE Right     BREAST BIOPSY       stereotactic-benign    BREAST BIOPSY      stereo-benign    BREAST CYST EXCISION Bilateral     benign    BREAST EXCISIONAL BIOPSY      unknown date-benign    BREAST EXCISIONAL BIOPSY      unknown date-benign    BREAST EXCISIONAL BIOPSY      unknown date-benign    BREAST EXCISIONAL BIOPSY      unknown age-benign    CARDIAC CATHETERIZATION      CATARACT EXTRACTION Right     CHOLECYSTECTOMY      COLONOSCOPY      EXAMINATION UNDER ANESTHESIA N/A 06/24/2021    Procedure: EXAM UNDER ANESTHESIA (EUA), DISE;  Surgeon: Gregory Maier MD;  Location: [facility];  Service: ENT    HERNIA REPAIR      hiatal    HIATAL HERNIA REPAIR      KNEE SURGERY Right     Torn maniscus lap surg    LIPOSUCTION      LYMPH NODE BIOPSY      MOHS SURGERY  05/20/2021    left mid forehead-Mickey    MOHS SURGERY Left 05/27/2021    Left nasal tip- mickey     MN LIGATION/BIOPSY TEMPORAL ARTERY Left 01/05/2023    Procedure: BIOPSY ARTERY TEMPORAL;  Surgeon: Alec Dennis DO;  Location: [facility];  Service: Vascular    MN OPEN IMPLANTATION CRANIAL NERVE BOOM & PULSE GEN N/A 11/10/2021    Procedure: INSERTION UPPER AIRWAY STIMULATOR, INSPIRE IMPLANT;  Surgeon: Gregory Maier MD;  Location: [facility];  Service: ENT    MN UNLISTED PROCEDURE FOOT/TOES Right 07/19/2022    Procedure: 1st metatarsal phalangeal joint fusion;  Surgeon: Dede Bonner DPM;  Location: [facility];  Service: Podiatry    REDUCTION MAMMAPLASTY Bilateral 2000    REDUCTION MAMMAPLASTY      SKIN BIOPSY      SKIN CANCER EXCISION  2007    squamous cell carcinoma     SKIN CANCER EXCISION  2007    basal cell carcinoma    SKIN CANCER EXCISION  2023    SCCIS chest    SQUAMOUS CELL CARCINOMA EXCISION      TOE SURGERY Right 08/04/2022    Right Great Toe Fusion    TONSILLECTOMY      TUBAL LIGATION      UPPER GASTROINTESTINAL ENDOSCOPY      US GUIDED BREAST BIOPSY RIGHT COMPLETE Right 07/18/2022     Social History     Tobacco Use    Smoking status: Former     Current packs/day: 0.00      Average packs/day: 2.0 packs/day for 10.0 years (20.0 ttl pk-yrs)     Types: Cigarettes     Start date: 1966     Quit date: 1976     Years since quittin.7     Passive exposure: Past    Smokeless tobacco: Never    Tobacco comments:     Smoked 2 pack a day   Vaping Use    Vaping status: Never Used   Substance and Sexual Activity    Alcohol use: Yes     Comment: 1 or 2 a year    Drug use: No    Sexual activity: Not Currently     Partners: Male     Birth control/protection: Abstinence, Post-menopausal, Female Sterilization     Comment: defer     E-Cigarette/Vaping    E-Cigarette Use Never User      E-Cigarette/Vaping Substances    Nicotine No     THC No     CBD No     Flavoring No     Other No     Unknown No      Family History   Problem Relation Age of Onset    Heart disease Mother     Depression Mother     Hypertension Mother     COPD Mother     Hearing loss Mother     Anxiety disorder Mother     Heart disease Father     Lung cancer Father 70        Smoker     Cancer Father         brain    Alcohol abuse Father     Dementia Father     Hypertension Father     Thyroid disease Father     COPD Father     Arthritis Father     Brain cancer Father 74    Hypertension Sister     Diabetes Sister     Heart disease Sister     Thyroid disease Sister     Cancer Sister         Lympoma, lung    Lung cancer Sister 79    Anxiety disorder Sister     Migraines Sister     Hypertension Brother     Diabetes Brother     Cancer Brother         Throat    Dementia Brother     Stroke Brother     Hypertension Brother     Heart disease Brother     Diabetes Brother     Hypertension Brother     Allergies Brother     Brain cancer Paternal Aunt         unknown age    No Known Problems Son     No Known Problems Son     Breast cancer Neg Hx      Social History     Tobacco Use    Smoking status: Former     Current packs/day: 0.00     Average packs/day: 2.0 packs/day for 10.0 years (20.0 ttl pk-yrs)     Types: Cigarettes     Start date:  1966     Quit date: 1976     Years since quittin.7     Passive exposure: Past    Smokeless tobacco: Never    Tobacco comments:     Smoked 2 pack a day   Vaping Use    Vaping status: Never Used   Substance and Sexual Activity    Alcohol use: Yes     Comment: 1 or 2 a year    Drug use: No    Sexual activity: Not Currently     Partners: Male     Birth control/protection: Abstinence, Post-menopausal, Female Sterilization     Comment: defer       Current Facility-Administered Medications:     acetaminophen (TYLENOL) tablet 650 mg, Q6H PRN    amLODIPine (NORVASC) tablet 10 mg, Daily    [START ON 2024] calcium carbonate-vitamin D 500 mg-5 mcg tablet 2 tablet, Daily With Breakfast    CoQ10 CAPS 100 mg, Daily    escitalopram (LEXAPRO) tablet 10 mg, Daily    gabapentin (NEURONTIN) capsule 100 mg, TID PRN    hydrALAZINE (APRESOLINE) tablet 25 mg, BID    insulin glargine (LANTUS) subcutaneous injection 10 Units 0.1 mL, HS    insulin lispro (HumALOG/ADMELOG) 100 units/mL subcutaneous injection 1-5 Units, 4x Daily (AC & HS) **AND** Fingerstick Glucose (POCT), 4x Daily AC and at bedtime    lactated ringers infusion, Continuous, Last Rate: 75 mL/hr (24 1500)    [START ON 2024] levothyroxine tablet 100 mcg, Early Morning    metoprolol tartrate (LOPRESSOR) tablet 50 mg, Q12H    pantoprazole (PROTONIX) EC tablet 40 mg, BID AC    polyethylene glycol (GOLYTELY) bowel prep 4,000 mL, See Admin Instructions    pravastatin (PRAVACHOL) tablet 40 mg, Daily With Dinner    sucralfate (CARAFATE) tablet 1 g, TID With Meals  Prior to Admission Medications   Prescriptions Last Dose Informant Patient Reported? Taking?   B-D ULTRAFINE III SHORT PEN 31G X 8 MM MISC  Self, Spouse/Significant Other Yes No   Calcium Citrate 250 MG TABS  Self No No   Sig: Take 2 tablets (500 mg total) by mouth in the morning   Coenzyme Q10 (CoQ10) 100 MG CAPS  Self, Spouse/Significant Other Yes No   Sig: Take 100 mg by mouth in the morning Bed  time   Cyanocobalamin (Vitamin B12) 1000 MCG TBCR  Self Yes No   Sig: Take 1,000 mcg by mouth once a week   Icosapent Ethyl (Vascepa) 1 g CAPS   No No   Sig: Take 2 capsules (2 g total) by mouth 2 (two) times a day   Probiotic Product (PROBIOTIC BLEND PO)  Self Yes No   Sig: Take by mouth   Vitamin D, Cholecalciferol, 25 MCG (1000 UT) TABS  Self Yes No   Sig: Take 2,000 Units by mouth in the morning   acetaminophen (TYLENOL) 500 mg tablet  Self Yes No   Sig: Take 1,000 mg by mouth every 6 (six) hours as needed for mild pain   albuterol (Ventolin HFA) 90 mcg/act inhaler  Self, Spouse/Significant Other No No   Sig: Inhale 2 puffs every 6 (six) hours as needed for wheezing   Patient not taking: Reported on 9/23/2024   amLODIPine (NORVASC) 5 mg tablet  Self No No   Sig: Take 2 tablets (10 mg total) by mouth daily   aspirin (ECOTRIN LOW STRENGTH) 81 mg EC tablet  Self, Spouse/Significant Other No No   Sig: Take 1 tablet (81 mg total) by mouth daily Do not start before October 31, 2023.   escitalopram (LEXAPRO) 10 mg tablet  Self No No   Sig: TAKE 1 TABLET BY MOUTH EVERY DAY   hydrALAZINE (APRESOLINE) 25 mg tablet  Self No No   Sig: Take 1 tablet (25 mg total) by mouth 2 (two) times a day   insulin aspart (NovoLOG) 100 units/mL injection  Self, Spouse/Significant Other Yes No   Sig: Inject under the skin 3 (three) times a day before meals 5units, 5units, 12units.   insulin detemir (Levemir FlexTouch) 100 Units/mL injection pen  Self, Spouse/Significant Other Yes No   Sig: Inject 20 Units under the skin every evening   levothyroxine 100 mcg tablet  Self No No   Sig: TAKE 1 TABLET BY MOUTH EVERY DAY   methenamine hippurate (HIPREX) 1 g tablet  Self, Spouse/Significant Other No No   Sig: Take 1 tablet (1 g total) by mouth 2 (two) times a day with meals   metoprolol tartrate (LOPRESSOR) 50 mg tablet   No No   Sig: Take 1 tablet (50 mg total) by mouth every 12 (twelve) hours   pantoprazole (PROTONIX) 40 mg tablet   No No    Sig: Take 1 tablet (40 mg total) by mouth 2 (two) times a day   pramipexole (MIRAPEX) 0.25 mg tablet  Self No No   Sig: TAKE 1 TABLET (0.25 MG TOTAL) BY MOUTH DAILY AT BEDTIME   rosuvastatin (CRESTOR) 5 mg tablet  Self, Spouse/Significant Other No No   Sig: Take 1 tablet (5 mg total) by mouth daily   sucralfate (CARAFATE) 1 g tablet   No No   Sig: Take 1 tablet (1 g total) by mouth 3 (three) times a day with meals   torsemide (DEMADEX) 10 mg tablet  Self No No   Sig: TAKE 10 MG. DAILY EXCEPT MONDAY, WEDNESDAY, FRIDAY 20 MG.      Facility-Administered Medications: None     Ciprofloxacin and Sulfamethoxazole-trimethoprim  Social History:  Marital Status: /Civil Union   Occupation: Retired  Patient Pre-hospital Living Situation: Home  Patient Pre-hospital Level of Mobility: walks  Patient Pre-hospital Diet Restrictions: None    Objective     Vitals:   Blood Pressure: 147/68 (09/29/24 1300)  Pulse: 59 (09/29/24 1300)  Temperature: 98 °F (36.7 °C) (09/29/24 1300)  Temp Source: Oral (09/29/24 1300)  Respirations: 18 (09/29/24 1300)  Weight - Scale: 67.9 kg (149 lb 11.1 oz) (09/29/24 0818)  SpO2: 97 % (09/29/24 1300)    Physical Exam  Vitals reviewed.   Constitutional:       General: She is not in acute distress.     Appearance: Normal appearance.   HENT:      Head: Normocephalic and atraumatic.      Mouth/Throat:      Mouth: Mucous membranes are dry.   Eyes:      Extraocular Movements: Extraocular movements intact.      Conjunctiva/sclera: Conjunctivae normal.   Cardiovascular:      Rate and Rhythm: Regular rhythm. Bradycardia present.      Heart sounds: Normal heart sounds. No murmur heard.     No friction rub. No gallop.   Pulmonary:      Effort: Pulmonary effort is normal. No respiratory distress.      Breath sounds: Normal breath sounds. No stridor. No wheezing, rhonchi or rales.   Chest:      Chest wall: No tenderness.   Abdominal:      General: Bowel sounds are normal. There is no distension.       Palpations: Abdomen is soft.      Tenderness: There is no abdominal tenderness.   Musculoskeletal:      Right lower leg: No edema.      Left lower leg: No edema.   Skin:     General: Skin is warm and dry.   Neurological:      General: No focal deficit present.      Mental Status: She is alert and oriented to person, place, and time.         Lines/Drains:  Lines/Drains/Airways       Active Status       None                        Additional Data:   Lab Results: I have reviewed the following results: CBC/BMP:   .     09/29/24  0826   WBC 10.04   HGB 7.8*   HCT 25.0*      SODIUM 138   K 3.8      CO2 27   BUN 35*   CREATININE 3.06*   GLUC 81   MG 2.3      Results from last 7 days   Lab Units 09/29/24  0826   WBC Thousand/uL 10.04   HEMOGLOBIN g/dL 7.8*   HEMATOCRIT % 25.0*   PLATELETS Thousands/uL 294   SEGS PCT % 65   LYMPHO PCT % 22   MONO PCT % 8   EOS PCT % 4     Results from last 7 days   Lab Units 09/29/24  0826   SODIUM mmol/L 138   POTASSIUM mmol/L 3.8   CHLORIDE mmol/L 103   CO2 mmol/L 27   BUN mg/dL 35*   CREATININE mg/dL 3.06*   ANION GAP mmol/L 8   CALCIUM mg/dL 9.0   ALBUMIN g/dL 3.7   TOTAL BILIRUBIN mg/dL 0.27   ALK PHOS U/L 64   ALT U/L 23   AST U/L 15   GLUCOSE RANDOM mg/dL 81     Results from last 7 days   Lab Units 09/29/24  0826   INR  0.89     Results from last 7 days   Lab Units 09/28/24  1056 09/28/24  0742 09/27/24  2119 09/27/24  1608 09/27/24  1109 09/27/24  0711 09/26/24  2105 09/26/24  1533 09/26/24  1048 09/26/24  0652 09/25/24  2123 09/25/24  1614   POC GLUCOSE mg/dl 126 114 152* 132 112 85 157* 106 88 78 95 110     Lab Results   Component Value Date    HGBA1C 6.4 (H) 09/24/2024    HGBA1C 6.9 (H) 03/05/2024    HGBA1C 7.1 (H) 10/28/2023           Imaging Review: No pertinent imaging studies reviewed.  Other Studies: EKG was reviewed.     Administrative Statements       ** Please Note: This note has been constructed using a voice recognition system. **

## 2024-09-29 NOTE — ASSESSMENT & PLAN NOTE
Stable on pantoprazole 40 mg BID  Received one dose of pantoprazole 40 mg IV in the ED    Plan:  Continue pantoprazole 40 mg BID

## 2024-09-29 NOTE — ASSESSMENT & PLAN NOTE
- Likely second to rebleed of dieulafoy lesion of colon   -Follow hemoglobin transfuse as needed  -Hemoglobin 7.9 on discharge on September 28, 2024 and was 7.8 here in the ER

## 2024-09-29 NOTE — CONSULTS
Consultation - Gastroenterology   Name: Trina Davis 74 y.o. female I MRN: [id number]  Unit/Bed#: ED-23 I Date of Admission: 9/29/2024   Date of Service: 9/29/2024 I Hospital Day: 0   Inpatient consult to gastroenterology  Consult performed by: Michael Mayorga PA-C  Consult ordered by: Anca Ahuja DO        Physician Requesting Evaluation: Acna Ahuja DO   Reason for Evaluation / Principal Problem: Lower GI bleeding    Assessment & Plan  Dieulafoy lesion of colon  -Clipped during colonoscopy on August 30, 2024  -Clipped again during colonoscopy on September 26, 2024  -Will plan repeat colonoscopy on Monday, September 30, 2024  -May need surgical evaluation due to recurrent bleeding  Hematochezia  -Likely second to rebleed of dieulafoy lesion of colon     Acute blood loss anemia  - Likely second to rebleed of dieulafoy lesion of colon   -Follow hemoglobin transfuse as needed  -Hemoglobin 7.9 on discharge on September 28, 2024 and was 7.8 here in the ER        History of Present Illness   HPI:  Trina Davis is a 74 y.o. female with past medical history of diabetes, COPD and chronic kidney disease Edil presenting to the emergency room after discharge on Saturday, September 28, 2024 with rectal bleeding.  She had a colonoscopy on August 30, 2024 with several polyps removed internal hemorrhoids noted and a small Dula Jorden lesion in the ascending colon status post Endo Clip x 2.  She then had a repeat colonoscopy on September 26, 2024 due to persistent rectal bleeding and again a Dula Jorden lesion in the ascending colon with appearance of recent bleed was clipped x 2.  2 further polyps removed at that time.  Her hemoglobin is relatively stable 7.9-7.8.    Vital signs are stable.  CBC with hemoglobin 7.9 down to 7.8.  Platelets normal at 294,000.  No leukocytosis.  BUN 35 and creatinine 3.06 which is around her baseline for chronic kidney disease.  INR 0.89.    Review of Systems   Constitutional:  Negative for  activity change, appetite change, chills, diaphoresis, fatigue and unexpected weight change.   HENT:  Negative for mouth sores, sore throat and trouble swallowing.    Eyes:  Negative for pain, redness and visual disturbance.   Respiratory:  Negative for apnea, chest tightness and shortness of breath.    Cardiovascular:  Negative for chest pain and leg swelling.   Gastrointestinal:  Negative for abdominal distention, abdominal pain, anal bleeding, blood in stool, constipation, diarrhea, nausea and vomiting.   Genitourinary:  Negative for difficulty urinating, dysuria and hematuria.   Musculoskeletal:  Negative for arthralgias, back pain, gait problem, joint swelling and myalgias.   Skin:  Negative for color change, pallor and rash.   Allergic/Immunologic: Negative for environmental allergies and food allergies.   Neurological:  Negative for dizziness, weakness, light-headedness, numbness and headaches.   Psychiatric/Behavioral:  Negative for agitation and behavioral problems.          Objective      Temp:  [97.7 °F (36.5 °C)-98.1 °F (36.7 °C)] 97.8 °F (36.6 °C)  HR:  [55-57] 56  Resp:  [18] 18  BP: (132-149)/(65-70) 137/66  O2 Device: None (Room air)          I/O       None          Lines/Drains/Airways       Active Status       None                  Physical Exam  Vitals reviewed.   Constitutional:       General: She is not in acute distress.     Appearance: Normal appearance. She is not ill-appearing.   HENT:      Head: Normocephalic and atraumatic.   Eyes:      General: No scleral icterus.     Conjunctiva/sclera: Conjunctivae normal.   Cardiovascular:      Rate and Rhythm: Normal rate and regular rhythm.   Pulmonary:      Effort: Pulmonary effort is normal. No respiratory distress.      Breath sounds: Normal breath sounds.   Abdominal:      General: Bowel sounds are normal. There is no distension.      Palpations: Abdomen is soft.      Tenderness: There is no abdominal tenderness. There is no guarding.   Skin:      General: Skin is warm and dry.   Neurological:      Mental Status: She is alert and oriented to person, place, and time.   Psychiatric:         Mood and Affect: Mood normal.         Behavior: Behavior normal.          Lab Results: I have reviewed the following results: CBC/BMP:   .     09/29/24  0826   WBC 10.04   HGB 7.8*   HCT 25.0*      SODIUM 138   K 3.8      CO2 27   BUN 35*   CREATININE 3.06*   GLUC 81   MG 2.3      Imaging Review: Reviewed radiology reports from this admission including: CT abdomen/pelvis.  Other Studies: No additional pertinent studies reviewed.        Michael Mayorga PA-C  Gastroentrology

## 2024-09-29 NOTE — ASSESSMENT & PLAN NOTE
Patient notes that last night, she noticed a bloody bowel movement and blood on the toilet paper after wiping  Overnight, she noticed some blood in her depends with stool and endorsed feeling lightheaded and fatigued  See primary problem for more information    Plan:  Serial H&H every 8 hours  Follow-up GI recommendations

## 2024-09-29 NOTE — ASSESSMENT & PLAN NOTE
With her previous 2 admissions, she was noted to have 2 separate dieulafoy lesions of the colon, both of which were clipped  At the time of discharge, her bloody bowel movements had improved, but returned the night of and into today  See primary problem for more information    Plan:  Will follow-up GI recommendations after colonoscopy

## 2024-09-29 NOTE — ED ATTENDING ATTESTATION
9/29/2024  IAnca DO, saw and evaluated the patient. I have discussed the patient with the resident/non-physician practitioner and agree with the resident's/non-physician practitioner's findings, Plan of Care, and MDM as documented in the resident's/non-physician practitioner's note, except where noted. All available labs and Radiology studies were reviewed.  I was present for key portions of any procedure(s) performed by the resident/non-physician practitioner and I was immediately available to provide assistance.       At this point I agree with the current assessment done in the Emergency Department.  I have conducted an independent evaluation of this patient a history and physical is as follows:    ED Course   74 year old female presents for evaluation after passing a maroon stool this am.  Patient then passed a second stool that was noted to have some bright red blood in it.  Patient reports very minimal abdominal cramping.  Patient was admitted to the hospital earlier this week after having melena.  Patient was found to have a few lesions in her colon that were clipped.  Her hemoglobin stabilized and she was discharged yesterday.  Patient states that she feels slightly lightheaded when she is ambulating.  She did not receive a blood transfusion on her last admission.    Past medical history: GI bleed, GERD, hypertension  Physical exam: Patient is awake and alert, no acute distress. Pale in appearance.   Resting comfortably.  Pupils are equal and reactive.  Neck is supple without JVD.  Heart is regular rate and rhythm without ectopy.  Lungs are clear to auscultation.  Chest wall is nontender to palpation.  Abdomen is soft, mild tenderness to palpation.   nondistended with normal active bowel sounds.  No lower extremity edema.  No focal motor or sensory deficits.  Speech is clear and appropriate    Assessment/plan: 74-year-old female presents after having a recurrent melanotic stool.  Recently had a  Deilofouy lesions clipped -differential diagnosis includes but not limited to recurrent bleeding from previously noted lesion, AVM, upper GI bleed, neoplasm, complication related to recently placed clip.  Will check hemoglobin, electrolytes, transfuse as necessary.  Patient will likely be admitted for close monitoring for recurrence of bleeding.      Critical Care Time  CriticalCare Time    Date/Time: 9/29/2024 2:10 PM    Performed by: Anca Ahuja DO  Authorized by: Anca Ahuja DO    Critical care provider statement:     Critical care time (minutes):  30    Critical care time was exclusive of:  Separately billable procedures and treating other patients and teaching time    Critical care was necessary to treat or prevent imminent or life-threatening deterioration of the following conditions: GI bleeding.    Critical care was time spent personally by me on the following activities:  Blood draw for specimens, obtaining history from patient or surrogate, development of treatment plan with patient or surrogate, discussions with consultants, evaluation of patient's response to treatment, examination of patient, re-evaluation of patient's condition, review of old charts, ordering and review of radiographic studies, ordering and review of laboratory studies and ordering and performing treatments and interventions    I assumed direction of critical care for this patient from another provider in my specialty: no

## 2024-09-29 NOTE — ED NOTES
Pt was wheeled to the restroom, and pt was able to ambulate into the restroom without assistance.  Pt is having a bowel movement.     Jessy Loaiza  09/29/24 0944

## 2024-09-29 NOTE — PLAN OF CARE
Problem: PAIN - ADULT  Goal: Verbalizes/displays adequate comfort level or baseline comfort level  Description: Interventions:  - Encourage patient to monitor pain and request assistance  - Assess pain using appropriate pain scale  - Administer analgesics based on type and severity of pain and evaluate response  - Implement non-pharmacological measures as appropriate and evaluate response  - Consider cultural and social influences on pain and pain management  - Notify physician/advanced practitioner if interventions unsuccessful or patient reports new pain  Outcome: Progressing     Problem: INFECTION - ADULT  Goal: Absence or prevention of progression during hospitalization  Description: INTERVENTIONS:  - Assess and monitor for signs and symptoms of infection  - Monitor lab/diagnostic results  - Monitor all insertion sites, i.e. indwelling lines, tubes, and drains  - Monitor endotracheal if appropriate and nasal secretions for changes in amount and color  - [place] appropriate cooling/warming therapies per order  - Administer medications as ordered  - Instruct and encourage patient and family to use good hand hygiene technique  - Identify and instruct in appropriate isolation precautions for identified infection/condition  Outcome: Progressing  Goal: Absence of fever/infection during neutropenic period  Description: INTERVENTIONS:  - Monitor WBC    Outcome: Progressing     Problem: SAFETY ADULT  Goal: Patient will remain free of falls  Description: INTERVENTIONS:  - Educate patient/family on patient safety including physical limitations  - Instruct patient to call for assistance with activity   - Consult OT/PT to assist with strengthening/mobility   - Keep Call bell within reach  - Keep bed low and locked with side rails adjusted as appropriate  - Keep care items and personal belongings within reach  - Initiate and maintain comfort rounds  - Make Fall Risk Sign visible to staff  - Offer Toileting every  Hours,  in advance of need  - Initiate/Maintain alarm  - Obtain necessary fall risk management equipment:   - Apply yellow socks and bracelet for high fall risk patients  - Consider moving patient to room near nurses station  Outcome: Progressing  Goal: Maintain or return to baseline ADL function  Description: INTERVENTIONS:  -  Assess patient's ability to carry out ADLs; assess patient's baseline for ADL function and identify physical deficits which impact ability to perform ADLs (bathing, care of mouth/teeth, toileting, grooming, dressing, etc.)  - Assess/evaluate cause of self-care deficits   - Assess range of motion  - Assess patient's mobility; develop plan if impaired  - Assess patient's need for assistive devices and provide as appropriate  - Encourage maximum independence but intervene and supervise when necessary  - Involve family in performance of ADLs  - Assess for home care needs following discharge   - Consider OT consult to assist with ADL evaluation and planning for discharge  - Provide patient education as appropriate  Outcome: Progressing  Goal: Maintains/Returns to pre admission functional level  Description: INTERVENTIONS:  - Perform AM-PAC 6 Click Basic Mobility/ Daily Activity assessment daily.  - Set and communicate daily mobility goal to care team and patient/family/caregiver.   - Collaborate with rehabilitation services on mobility goals if consulted  - Perform Range of Motion  times a day.  - Reposition patient every  hours.  - Dangle patient  times a day  - Stand patient  times a day  - Ambulate patient  times a day  - Out of bed to chair  times a day   - Out of bed for meals times a day  - Out of bed for toileting  - Record patient progress and toleration of activity level   Outcome: Progressing     Problem: SAFETY ADULT  Goal: Maintain or return to baseline ADL function  Description: INTERVENTIONS:  -  Assess patient's ability to carry out ADLs; assess patient's baseline for ADL function and  identify physical deficits which impact ability to perform ADLs (bathing, care of mouth/teeth, toileting, grooming, dressing, etc.)  - Assess/evaluate cause of self-care deficits   - Assess range of motion  - Assess patient's mobility; develop plan if impaired  - Assess patient's need for assistive devices and provide as appropriate  - Encourage maximum independence but intervene and supervise when necessary  - Involve family in performance of ADLs  - Assess for home care needs following discharge   - Consider OT consult to assist with ADL evaluation and planning for discharge  - Provide patient education as appropriate  Outcome: Progressing     Problem: DISCHARGE PLANNING  Goal: Discharge to home or other facility with appropriate resources  Description: INTERVENTIONS:  - Identify barriers to discharge w/patient and caregiver  - Arrange for needed discharge resources and transportation as appropriate  - Identify discharge learning needs (meds, wound care, etc.)  - Arrange for interpretive services to assist at discharge as needed  - Refer to Case Management Department for coordinating discharge planning if the patient needs post-hospital services based on physician/advanced practitioner order or complex needs related to functional status, cognitive ability, or social support system  Outcome: Progressing

## 2024-09-30 ENCOUNTER — TRANSITIONAL CARE MANAGEMENT (OUTPATIENT)
Dept: INTERNAL MEDICINE CLINIC | Facility: CLINIC | Age: 74
End: 2024-09-30

## 2024-09-30 ENCOUNTER — ANESTHESIA (INPATIENT)
Dept: GASTROENTEROLOGY | Facility: HOSPITAL | Age: 74
DRG: 919 | End: 2024-09-30
Payer: MEDICARE

## 2024-09-30 ENCOUNTER — APPOINTMENT (INPATIENT)
Dept: GASTROENTEROLOGY | Facility: HOSPITAL | Age: 74
DRG: 919 | End: 2024-09-30
Payer: MEDICARE

## 2024-09-30 ENCOUNTER — ANESTHESIA EVENT (INPATIENT)
Dept: GASTROENTEROLOGY | Facility: HOSPITAL | Age: 74
DRG: 919 | End: 2024-09-30
Payer: MEDICARE

## 2024-09-30 ENCOUNTER — APPOINTMENT (INPATIENT)
Dept: VASCULAR ULTRASOUND | Facility: HOSPITAL | Age: 74
DRG: 919 | End: 2024-09-30
Payer: MEDICARE

## 2024-09-30 PROBLEM — E03.9 HYPOTHYROIDISM: Status: ACTIVE | Noted: 2024-09-30

## 2024-09-30 PROBLEM — R60.0 EDEMA OF EXTREMITY: Status: ACTIVE | Noted: 2024-09-30

## 2024-09-30 PROBLEM — I10 HYPERTENSION: Status: ACTIVE | Noted: 2024-09-30

## 2024-09-30 PROBLEM — R07.89 ATYPICAL CHEST PAIN: Status: ACTIVE | Noted: 2024-09-30

## 2024-09-30 LAB
4HR DELTA HS TROPONIN: 1 NG/L
ABO GROUP BLD BPU: NORMAL
ABO GROUP BLD BPU: NORMAL
ANION GAP SERPL CALCULATED.3IONS-SCNC: 10 MMOL/L (ref 4–13)
ATRIAL RATE: 56 BPM
BPU ID: NORMAL
BPU ID: NORMAL
BUN SERPL-MCNC: 28 MG/DL (ref 5–25)
CALCIUM SERPL-MCNC: 8.3 MG/DL (ref 8.4–10.2)
CARDIAC TROPONIN I PNL SERPL HS: 6 NG/L
CARDIAC TROPONIN I PNL SERPL HS: 7 NG/L
CHLORIDE SERPL-SCNC: 102 MMOL/L (ref 96–108)
CO2 SERPL-SCNC: 25 MMOL/L (ref 21–32)
CREAT SERPL-MCNC: 2.85 MG/DL (ref 0.6–1.3)
CROSSMATCH: NORMAL
CROSSMATCH: NORMAL
ERYTHROCYTE [DISTWIDTH] IN BLOOD BY AUTOMATED COUNT: 14.1 % (ref 11.6–15.1)
GFR SERPL CREATININE-BSD FRML MDRD: 15 ML/MIN/1.73SQ M
GLUCOSE SERPL-MCNC: 105 MG/DL (ref 65–140)
GLUCOSE SERPL-MCNC: 93 MG/DL (ref 65–140)
GLUCOSE SERPL-MCNC: 93 MG/DL (ref 65–140)
GLUCOSE SERPL-MCNC: 95 MG/DL (ref 65–140)
GLUCOSE SERPL-MCNC: 98 MG/DL (ref 65–140)
HCT VFR BLD AUTO: 28.9 % (ref 34.8–46.1)
HCT VFR BLD AUTO: 29 % (ref 34.8–46.1)
HCT VFR BLD AUTO: 30 % (ref 34.8–46.1)
HCT VFR BLD AUTO: 34.9 % (ref 34.8–46.1)
HGB BLD-MCNC: 11.4 G/DL (ref 11.5–15.4)
HGB BLD-MCNC: 9.5 G/DL (ref 11.5–15.4)
HGB BLD-MCNC: 9.6 G/DL (ref 11.5–15.4)
HGB BLD-MCNC: 9.9 G/DL (ref 11.5–15.4)
MCH RBC QN AUTO: 27.9 PG (ref 26.8–34.3)
MCHC RBC AUTO-ENTMCNC: 32.8 G/DL (ref 31.4–37.4)
MCV RBC AUTO: 85 FL (ref 82–98)
MISCELLANEOUS LAB TEST RESULT: NORMAL
P AXIS: 45 DEGREES
PLATELET # BLD AUTO: 240 THOUSANDS/UL (ref 149–390)
PMV BLD AUTO: 11 FL (ref 8.9–12.7)
POTASSIUM SERPL-SCNC: 3.6 MMOL/L (ref 3.5–5.3)
PR INTERVAL: 238 MS
QRS AXIS: 24 DEGREES
QRSD INTERVAL: 86 MS
QT INTERVAL: 464 MS
QTC INTERVAL: 447 MS
RBC # BLD AUTO: 3.41 MILLION/UL (ref 3.81–5.12)
SODIUM SERPL-SCNC: 137 MMOL/L (ref 135–147)
T WAVE AXIS: 67 DEGREES
UNIT DISPENSE STATUS: NORMAL
UNIT DISPENSE STATUS: NORMAL
UNIT PRODUCT CODE: NORMAL
UNIT PRODUCT CODE: NORMAL
UNIT PRODUCT VOLUME: 300 ML
UNIT PRODUCT VOLUME: 300 ML
UNIT RH: NORMAL
UNIT RH: NORMAL
VENTRICULAR RATE: 56 BPM
WBC # BLD AUTO: 12.23 THOUSAND/UL (ref 4.31–10.16)

## 2024-09-30 PROCEDURE — 45382 COLONOSCOPY W/CONTROL BLEED: CPT | Performed by: INTERNAL MEDICINE

## 2024-09-30 PROCEDURE — 85027 COMPLETE CBC AUTOMATED: CPT

## 2024-09-30 PROCEDURE — 93005 ELECTROCARDIOGRAM TRACING: CPT

## 2024-09-30 PROCEDURE — 85018 HEMOGLOBIN: CPT | Performed by: INTERNAL MEDICINE

## 2024-09-30 PROCEDURE — 85014 HEMATOCRIT: CPT | Performed by: INTERNAL MEDICINE

## 2024-09-30 PROCEDURE — 84484 ASSAY OF TROPONIN QUANT: CPT

## 2024-09-30 PROCEDURE — 0W3P8ZZ CONTROL BLEEDING IN GASTROINTESTINAL TRACT, VIA NATURAL OR ARTIFICIAL OPENING ENDOSCOPIC: ICD-10-PCS | Performed by: INTERNAL MEDICINE

## 2024-09-30 PROCEDURE — 93010 ELECTROCARDIOGRAM REPORT: CPT | Performed by: INTERNAL MEDICINE

## 2024-09-30 PROCEDURE — 99232 SBSQ HOSP IP/OBS MODERATE 35: CPT | Performed by: INTERNAL MEDICINE

## 2024-09-30 PROCEDURE — 93971 EXTREMITY STUDY: CPT

## 2024-09-30 PROCEDURE — 82948 REAGENT STRIP/BLOOD GLUCOSE: CPT

## 2024-09-30 PROCEDURE — 93971 EXTREMITY STUDY: CPT | Performed by: INTERNAL MEDICINE

## 2024-09-30 PROCEDURE — 80048 BASIC METABOLIC PNL TOTAL CA: CPT

## 2024-09-30 RX ORDER — PRAMIPEXOLE DIHYDROCHLORIDE 0.25 MG/1
0.25 TABLET ORAL
Status: DISCONTINUED | OUTPATIENT
Start: 2024-09-30 | End: 2024-10-01 | Stop reason: HOSPADM

## 2024-09-30 RX ORDER — LIDOCAINE HYDROCHLORIDE 10 MG/ML
INJECTION, SOLUTION EPIDURAL; INFILTRATION; INTRACAUDAL; PERINEURAL AS NEEDED
Status: DISCONTINUED | OUTPATIENT
Start: 2024-09-30 | End: 2024-09-30

## 2024-09-30 RX ORDER — GABAPENTIN 100 MG/1
100 CAPSULE ORAL EVERY 8 HOURS PRN
COMMUNITY

## 2024-09-30 RX ORDER — PROPOFOL 10 MG/ML
INJECTION, EMULSION INTRAVENOUS AS NEEDED
Status: DISCONTINUED | OUTPATIENT
Start: 2024-09-30 | End: 2024-09-30

## 2024-09-30 RX ORDER — ACETAMINOPHEN 325 MG/1
650 TABLET ORAL EVERY 4 HOURS PRN
Status: DISCONTINUED | OUTPATIENT
Start: 2024-09-30 | End: 2024-10-01 | Stop reason: HOSPADM

## 2024-09-30 RX ORDER — GABAPENTIN 100 MG/1
200 CAPSULE ORAL 3 TIMES DAILY PRN
Status: DISCONTINUED | OUTPATIENT
Start: 2024-09-30 | End: 2024-09-30

## 2024-09-30 RX ORDER — SIMETHICONE 40MG/0.6ML
SUSPENSION, DROPS(FINAL DOSAGE FORM)(ML) ORAL AS NEEDED
Status: COMPLETED | OUTPATIENT
Start: 2024-09-30 | End: 2024-09-30

## 2024-09-30 RX ORDER — GABAPENTIN 100 MG/1
100 CAPSULE ORAL EVERY 6 HOURS PRN
Status: DISCONTINUED | OUTPATIENT
Start: 2024-09-30 | End: 2024-10-01 | Stop reason: HOSPADM

## 2024-09-30 RX ADMIN — ESCITALOPRAM OXALATE 10 MG: 10 TABLET ORAL at 08:45

## 2024-09-30 RX ADMIN — SUCRALFATE 1 G: 1 TABLET ORAL at 16:25

## 2024-09-30 RX ADMIN — AMLODIPINE BESYLATE 10 MG: 10 TABLET ORAL at 08:45

## 2024-09-30 RX ADMIN — ACETAMINOPHEN 650 MG: 325 TABLET ORAL at 02:22

## 2024-09-30 RX ADMIN — METOPROLOL TARTRATE 50 MG: 50 TABLET, FILM COATED ORAL at 02:22

## 2024-09-30 RX ADMIN — INSULIN GLARGINE 10 UNITS: 100 INJECTION, SOLUTION SUBCUTANEOUS at 21:56

## 2024-09-30 RX ADMIN — Medication 40 MG: at 14:56

## 2024-09-30 RX ADMIN — LEVOTHYROXINE SODIUM 100 MCG: 100 TABLET ORAL at 05:00

## 2024-09-30 RX ADMIN — SUCRALFATE 1 G: 1 TABLET ORAL at 08:44

## 2024-09-30 RX ADMIN — PANTOPRAZOLE SODIUM 40 MG: 40 TABLET, DELAYED RELEASE ORAL at 08:45

## 2024-09-30 RX ADMIN — HYDRALAZINE HYDROCHLORIDE 25 MG: 25 TABLET ORAL at 21:55

## 2024-09-30 RX ADMIN — GABAPENTIN 100 MG: 100 CAPSULE ORAL at 02:22

## 2024-09-30 RX ADMIN — Medication 2 TABLET: at 08:43

## 2024-09-30 RX ADMIN — GABAPENTIN 100 MG: 100 CAPSULE ORAL at 18:22

## 2024-09-30 RX ADMIN — LIDOCAINE HYDROCHLORIDE 50 MG: 10 INJECTION, SOLUTION EPIDURAL; INFILTRATION; INTRACAUDAL; PERINEURAL at 14:51

## 2024-09-30 RX ADMIN — PROPOFOL 20 MG: 10 INJECTION, EMULSION INTRAVENOUS at 15:05

## 2024-09-30 RX ADMIN — Medication 100 MG: at 08:45

## 2024-09-30 RX ADMIN — PROPOFOL 30 MG: 10 INJECTION, EMULSION INTRAVENOUS at 14:54

## 2024-09-30 RX ADMIN — METOPROLOL TARTRATE 50 MG: 50 TABLET, FILM COATED ORAL at 16:24

## 2024-09-30 RX ADMIN — ACETAMINOPHEN 650 MG: 325 TABLET ORAL at 18:22

## 2024-09-30 RX ADMIN — HYDRALAZINE HYDROCHLORIDE 25 MG: 25 TABLET ORAL at 08:44

## 2024-09-30 RX ADMIN — PROPOFOL 30 MG: 10 INJECTION, EMULSION INTRAVENOUS at 14:59

## 2024-09-30 RX ADMIN — PANTOPRAZOLE SODIUM 40 MG: 40 TABLET, DELAYED RELEASE ORAL at 16:25

## 2024-09-30 RX ADMIN — PROPOFOL 80 MG: 10 INJECTION, EMULSION INTRAVENOUS at 14:51

## 2024-09-30 RX ADMIN — PRAMIPEXOLE DIHYDROCHLORIDE 0.25 MG: 0.25 TABLET ORAL at 21:55

## 2024-09-30 RX ADMIN — SUCRALFATE 1 G: 1 TABLET ORAL at 12:15

## 2024-09-30 RX ADMIN — PRAVASTATIN SODIUM 40 MG: 40 TABLET ORAL at 16:25

## 2024-09-30 NOTE — ASSESSMENT & PLAN NOTE
s/p TNK on 10/27/23. 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

## 2024-09-30 NOTE — PLAN OF CARE
Problem: PAIN - ADULT  Goal: Verbalizes/displays adequate comfort level or baseline comfort level  Description: Interventions:  - Encourage patient to monitor pain and request assistance  - Assess pain using appropriate pain scale  - Administer analgesics based on type and severity of pain and evaluate response  - Implement non-pharmacological measures as appropriate and evaluate response  - Consider cultural and social influences on pain and pain management  - Notify physician/advanced practitioner if interventions unsuccessful or patient reports new pain  Outcome: Progressing     Problem: INFECTION - ADULT  Goal: Absence or prevention of progression during hospitalization  Description: INTERVENTIONS:  - Assess and monitor for signs and symptoms of infection  - Monitor lab/diagnostic results  - Monitor all insertion sites, i.e. indwelling lines, tubes, and drains  - Monitor endotracheal if appropriate and nasal secretions for changes in amount and color  - [place] appropriate cooling/warming therapies per order  - Administer medications as ordered  - Instruct and encourage patient and family to use good hand hygiene technique  - Identify and instruct in appropriate isolation precautions for identified infection/condition  Outcome: Progressing  Goal: Absence of fever/infection during neutropenic period  Description: INTERVENTIONS:  - Monitor WBC    Outcome: Progressing     Problem: SAFETY ADULT  Goal: Patient will remain free of falls  Description: INTERVENTIONS:  - Educate patient/family on patient safety including physical limitations  - Instruct patient to call for assistance with activity   - Consult OT/PT to assist with strengthening/mobility   - Keep Call bell within reach  - Keep bed low and locked with side rails adjusted as appropriate  - Keep care items and personal belongings within reach  - Initiate and maintain comfort rounds  - Make Fall Risk Sign visible to staff  - Offer Toileting every  Hours,  in advance of need  - Initiate/Maintain alarm  - Obtain necessary fall risk management equipment:   - Apply yellow socks and bracelet for high fall risk patients  - Consider moving patient to room near nurses station  Outcome: Progressing  Goal: Maintain or return to baseline ADL function  Description: INTERVENTIONS:  -  Assess patient's ability to carry out ADLs; assess patient's baseline for ADL function and identify physical deficits which impact ability to perform ADLs (bathing, care of mouth/teeth, toileting, grooming, dressing, etc.)  - Assess/evaluate cause of self-care deficits   - Assess range of motion  - Assess patient's mobility; develop plan if impaired  - Assess patient's need for assistive devices and provide as appropriate  - Encourage maximum independence but intervene and supervise when necessary  - Involve family in performance of ADLs  - Assess for home care needs following discharge   - Consider OT consult to assist with ADL evaluation and planning for discharge  - Provide patient education as appropriate  Outcome: Progressing  Goal: Maintains/Returns to pre admission functional level  Description: INTERVENTIONS:  - Perform AM-PAC 6 Click Basic Mobility/ Daily Activity assessment daily.  - Set and communicate daily mobility goal to care team and patient/family/caregiver.   - Collaborate with rehabilitation services on mobility goals if consulted  - Perform Range of Motion  times a day.  - Reposition patient every  hours.  - Dangle patient  times a day  - Stand patient  times a day  - Ambulate patient  times a day  - Out of bed to chair  times a day   - Out of bed for meals  times a day  - Out of bed for toileting  - Record patient progress and toleration of activity level   Outcome: Progressing     Problem: DISCHARGE PLANNING  Goal: Discharge to home or other facility with appropriate resources  Description: INTERVENTIONS:  - Identify barriers to discharge w/patient and caregiver  - Arrange for  needed discharge resources and transportation as appropriate  - Identify discharge learning needs (meds, wound care, etc.)  - Arrange for interpretive services to assist at discharge as needed  - Refer to Case Management Department for coordinating discharge planning if the patient needs post-hospital services based on physician/advanced practitioner order or complex needs related to functional status, cognitive ability, or social support system  Outcome: Progressing

## 2024-09-30 NOTE — ASSESSMENT & PLAN NOTE
Stable on pantoprazole 40 mg BID  History of hiatal hernia noted on imaging  Received one dose of pantoprazole 40 mg IV in the ED    Plan:  Continue pantoprazole 40 mg BID

## 2024-09-30 NOTE — ANESTHESIA POSTPROCEDURE EVALUATION
Post-Op Assessment Note    CV Status:  Stable  Pain Score: 0    Pain management: adequate       Mental Status:  Alert and awake   Hydration Status:  Euvolemic   PONV Controlled:  Controlled   Airway Patency:  Patent     Post Op Vitals Reviewed: Yes    No anethesia notable event occurred.    Staff: CRNA           /56 (09/30/24 1513)    Temp (!) 96.8 °F (36 °C) (09/30/24 1513)    Pulse 61 (09/30/24 1513)   Resp 18 (09/30/24 1513)    SpO2 93 % (09/30/24 1513)

## 2024-09-30 NOTE — ASSESSMENT & PLAN NOTE
Home regimen: amlodipine 10 mg QD, hydralzine 25 mg BID, lopressor 50 mg BID, torsemide 10 mg daily except for M/W/F 20 mg

## 2024-09-30 NOTE — ASSESSMENT & PLAN NOTE
Patient reported episode of chest pain this morning lasting 30 minutes, localized to the left side of her chest and reproducible upon palpation. The chest pain went away on its own.  It rated to her shoulder.  She occasionally has this type of pain at home.    Previous workup:  Stress test 6/2023: No perfusion defects and normal ejection fraction.  Negative for ischemia after pharmacologic stress, without reproduction of symptoms.  Zio patch monitor for palpitations: Symptoms correlating with isolated ventricular ectopy and no significant arrhythmias during monitoring time.    EKG obtained this morning unremarkable, no acute ST changes. Initial troponin 6    Plan:  Continue to monitor, resolved this morning

## 2024-09-30 NOTE — ASSESSMENT & PLAN NOTE
Lab Results   Component Value Date    HGBA1C 6.4 (H) 09/24/2024       Recent Labs     09/28/24  0742 09/28/24  1056 09/29/24  1610 09/29/24 2053   POCGLU 114 126 170* 92       Blood Sugar Average: Last 72 hrs:  (P) 131  Blood glucose has been stable  Previously, patient was on 5 units of mealtime insulin at breakfast and lunch, and 12 units of mealtime insulin at dinnertime. She was taking insulin detemir 20 units at bedtime  During her previous hospitalization, her blood sugars were well-controlled and she was taken off mealtime insulin, placed on 10 units of long-acting insulin at bedtime, and placed on sliding scale insulin with good control    Plan:  Continue sliding scale insulin, 10 units of long-acting insulin at bedtime  Monitor blood sugars

## 2024-09-30 NOTE — ASSESSMENT & PLAN NOTE
Lipid panel 3/5/2024: Total cholesterol: 111, triglycerides: 236, HDL: 38, LDL: 26  At home she takes icosapent ethyl 2 g twice daily and Crestor 5 mg daily  Currently icosapent ethyl held, discuss risk of bleeding in the setting of GI bleed    Plan:  Will order pravastatin 40 mg daily

## 2024-09-30 NOTE — ASSESSMENT & PLAN NOTE
"Lab Results   Component Value Date    HGB 9.6 (L) 09/30/2024    HGB 9.5 (L) 09/30/2024    HGB 7.3 (L) 09/29/2024    HGB 7.8 (L) 09/29/2024    HGB 7.9 (L) 09/28/2024    HGB 7.1 (L) 09/28/2024    HGB 7.4 (L) 09/27/2024    HGB 7.0 (L) 09/27/2024    HGB 8.2 (L) 09/26/2024    HGB 7.7 (L) 09/26/2024     Patient received 2 Venofer infusions (total of 400 mg IV) during her last hospitalization, and on discharge her hemoglobin was stable at 7.9  Baseline this past year appears to be around 11.2-11.4, drop noted to 8.7 in September   She returned to the ED after she noticed overnight that she had \"sweet potato-colored stools\" with visible blood on wiping. On day of admission she noticed bright red blood in her depends overnight, and was fatigued  She was noted to have another bloody bowel movement with bright red blood per rectum noted after admission  She received 1 unit of packed RBCs and appeared to have less pallor post-infusion  Gastroenterology was consulted and will be repeating a colonoscopy today.  They will consider surgical consultation after  Previously had a colonoscopy 8/30/24 which showed several polyps removed, internal hemorrhoids, and a small Dula Cheryle lesion in ascending colon s/p endo clip x2   Repeat colonoscopy 9/26/24 was obtained due to bleeding once more, demonstrated dula cheryle lesion in ascending colon with bleed and was clipped x 2. Additional polyps removed at the time    Plan:  Monitor H&H every 8 hours, transfuse as needed  Colonoscopy today  Follow-up GI recommendations  "

## 2024-09-30 NOTE — PROGRESS NOTES
"Progress Note - Hospitalist   Name: Trina Davis 74 y.o. female I MRN: [id number]  Unit/Bed#: S -01 I Date of Admission: 9/29/2024   Date of Service: 9/30/2024 I Hospital Day: 1    Assessment & Plan  Acute blood loss anemia  Lab Results   Component Value Date    HGB 9.6 (L) 09/30/2024    HGB 9.5 (L) 09/30/2024    HGB 7.3 (L) 09/29/2024    HGB 7.8 (L) 09/29/2024    HGB 7.9 (L) 09/28/2024    HGB 7.1 (L) 09/28/2024    HGB 7.4 (L) 09/27/2024    HGB 7.0 (L) 09/27/2024    HGB 8.2 (L) 09/26/2024    HGB 7.7 (L) 09/26/2024     Patient received 2 Venofer infusions (total of 400 mg IV) during her last hospitalization, and on discharge her hemoglobin was stable at 7.9  Baseline this past year appears to be around 11.2-11.4, drop noted to 8.7 in September   She returned to the ED after she noticed overnight that she had \"sweet potato-colored stools\" with visible blood on wiping. On day of admission she noticed bright red blood in her depends overnight, and was fatigued  She was noted to have another bloody bowel movement with bright red blood per rectum noted after admission  She received 1 unit of packed RBCs and appeared to have less pallor post-infusion  Gastroenterology was consulted and will be repeating a colonoscopy today.  They will consider surgical consultation after  Previously had a colonoscopy 8/30/24 which showed several polyps removed, internal hemorrhoids, and a small Dula Cheryle lesion in ascending colon s/p endo clip x2   Repeat colonoscopy 9/26/24 was obtained due to bleeding once more, demonstrated dula cheryle lesion in ascending colon with bleed and was clipped x 2. Additional polyps removed at the time    Plan:  Monitor H&H every 8 hours, transfuse as needed  Colonoscopy today  Follow-up GI recommendations  Hematochezia  Patient notes that last night, she noticed a bloody bowel movement and blood on the toilet paper after wiping  No other episodes since  See primary problem for more " information    Plan:  Serial H&H every 8 hours  Follow-up GI recommendations  Anemia in stage 5 chronic kidney disease, not on chronic dialysis  (HCC)  Lab Results   Component Value Date    EGFR 14 09/29/2024    EGFR 14 09/28/2024    EGFR 15 09/27/2024    CREATININE 3.06 (H) 09/29/2024    CREATININE 2.99 (H) 09/28/2024    CREATININE 2.83 (H) 09/27/2024     Patient has a history of anemia, with a baseline hemoglobin in the 11's (as of early August)  More recently, patient's hemoglobin has dropped to the 7s and 8s with concomitant fatigue and weakness, likely secondary to GI bleed  Creatinine is stable near baseline of 3  During her last admission, she received Venofer infusions (2)  In the ED, she was started on 1 unit of packed RBCs  Dieulafoy lesion of colon  With her previous 2 admissions, she was noted to have 2 dieulafoy lesions of the colon, both of which were clipped and hemostasis was achieved  5/29/24: Single small Dieulafoy's lesion in the ascending colon  9/26/24: Single small Dieulafoy's lesion in the ascending colon 70 cm from the anal verge   At the time of discharge on 9/28, her bloody bowel movements had improved, but returned the night of   See primary problem for more information    Plan:  Will follow-up GI recommendations after colonoscopy  Gastroesophageal reflux disease without esophagitis  Stable on pantoprazole 40 mg BID  History of hiatal hernia noted on imaging  Received one dose of pantoprazole 40 mg IV in the ED    Plan:  Continue pantoprazole 40 mg BID  Type 2 diabetes mellitus (HCC)  Lab Results   Component Value Date    HGBA1C 6.4 (H) 09/24/2024       Recent Labs     09/28/24  0742 09/28/24  1056 09/29/24  1610 09/29/24 2053   POCGLU 114 126 170* 92       Blood Sugar Average: Last 72 hrs:  (P) 131  Blood glucose has been stable  Previously, patient was on 5 units of mealtime insulin at breakfast and lunch, and 12 units of mealtime insulin at dinnertime. She was taking insulin detemir 20  units at bedtime  During her previous hospitalization, her blood sugars were well-controlled and she was taken off mealtime insulin, placed on 10 units of long-acting insulin at bedtime, and placed on sliding scale insulin with good control    Plan:  Continue sliding scale insulin, 10 units of long-acting insulin at bedtime  Monitor blood sugars    HLD (hyperlipidemia)  Lipid panel 3/5/2024: Total cholesterol: 111, triglycerides: 236, HDL: 38, LDL: 26  At home she takes icosapent ethyl 2 g twice daily and Crestor 5 mg daily  Currently icosapent ethyl held, discuss risk of bleeding in the setting of GI bleed    Plan:  Will order pravastatin 40 mg daily  History of cerebrovascular accident (CVA) due to ischemia  s/p TNK on 10/27/23. Had severe HA, word findings difficulty/aphasia   MRI brain 10/30/2023: Infarct involving the right putamen  Maintained on Aspirin 81 mg QD    Plan:  Holding aspirin in setting of GI bleed  Continue pravastatin 40 mg QD  Hypertension  Home regimen: amlodipine 10 mg QD, hydralzine 25 mg BID, lopressor 50 mg BID, torsemide 10 mg daily except for M/W/F 20 mg  Hypothyroidism  Continue levothyroxine 100 mcg QD  Last TSH low, free T4 1.16 on 4/17/24  Edema of extremity  Subcutaneous collection distal to left antecubital fossa causing mild downstream edema noted on admission  VAS duplex UE: Superficial thrombophlebitis noted in the medial vein. No evidence of DVT    Plan:  Warm compress and elevation as needed for superficial thrombophlebitis    Atypical chest pain  Patient reported episode of chest pain this morning lasting 30 minutes, localized to the left side of her chest and reproducible upon palpation. The chest pain went away on its own.  It rated to her shoulder.  She occasionally has this type of pain at home.    Previous workup:  Stress test 6/2023: No perfusion defects and normal ejection fraction.  Negative for ischemia after pharmacologic stress, without reproduction of  symptoms.  Zio patch monitor for palpitations: Symptoms correlating with isolated ventricular ectopy and no significant arrhythmias during monitoring time.    EKG obtained this morning unremarkable, no acute ST changes. Initial troponin 6    Plan:  Continue to monitor, resolved this morning    VTE Pharmacologic Prophylaxis: VTE Score: 5 High Risk (Score >/= 5) - Pharmacological DVT Prophylaxis Contraindicated. Sequential Compression Devices Ordered.    Mobility:   -[facility] Achieved: 6: Walk 10 steps or more    Patient Centered Rounds: Will discuss with nursing  Discussions with Specialists or Other Care Team Provider: Gastroenterology     Education and Discussions with Family / Patient: Updated  () at bedside.     Current Length of Stay: 1 day(s)  Current Patient Status: Inpatient   Certification Statement: The patient will continue to require additional inpatient hospital stay due to colonoscopy and monitoring Hgb level  Discharge Plan: Anticipate discharge in 24-48 hrs to discharge location to be determined pending rehab evaluations.    Code Status: Level 3 - DNAR and DNI    Subjective   Patient reports pain in her left ventral lower forearm, near an area where she had prior IV access. She denies any loss of sensation to light touch or changes in temperature sensation.  She received a unit of blood overnight.  She endorses fatigue but denies shortness of breath, chest pain, nausea, vomiting, difficulties urinating, fever, or chills.  She did experience an episode of chest pain this morning that lasted about 30 minutes and went away on its own without any intervention.  The pain was localized to the left side of her chest and worsened with palpation.  It radiated to her shoulder.  She associated the episode with some shortness of breath but no nausea, vomiting, or diaphoresis.  She did not have any chest pain during our encounter besides when I palpated.  She reports not having a good night sleep  as well.  She has restless leg syndrome at baseline.  Since her bowel prep, she has not noticed any more episodes of maroon or blood-tinged stool compared to yesterday.  She denies hematuria.    Objective     Vitals:   Temp (24hrs), Av.2 °F (36.8 °C), Min:97.7 °F (36.5 °C), Max:99.1 °F (37.3 °C)    Temp:  [97.7 °F (36.5 °C)-99.1 °F (37.3 °C)] 98.1 °F (36.7 °C)  HR:  [55-70] 61  Resp:  [18] 18  BP: (116-159)/(53-75) 137/58  SpO2:  [91 %-97 %] 93 %  Body mass index is 26.52 kg/m².     Input and Output Summary (last 24 hours):     Intake/Output Summary (Last 24 hours) at 2024 0549  Last data filed at 2024 0245  Gross per 24 hour   Intake 1930 ml   Output 900 ml   Net 1030 ml       Physical Exam  Constitutional:       General: She is not in acute distress.     Appearance: She is not diaphoretic.   HENT:      Head: Normocephalic.      Mouth/Throat:      Mouth: Mucous membranes are moist.   Cardiovascular:      Rate and Rhythm: Regular rhythm.      Comments: Measured rate 60bpm. Reproducible chest pain in the left side of her chest  Pulmonary:      Effort: Pulmonary effort is normal.      Comments: Somewhat decreased breath sounds at right base  Abdominal:      Palpations: Abdomen is soft.      Tenderness: There is no abdominal tenderness. There is no guarding.   Musculoskeletal:         General: Swelling present.      Right lower leg: No edema.      Left lower leg: No edema.      Comments: Circumferential area of swelling in the ventral aspect of the lower portion of the forearm with some mild proximal edema. Area just proximal to where her IV site was. Radial pulse intact. No loss of sensation distally. Patient reports some decreased sensation to light touch over the area although she just had an ice pack over it during the night. Pain with palpation over the area   Skin:     General: Skin is warm and dry.      Coloration: Skin is pale.      Findings: Bruising present.   Neurological:      General: No  focal deficit present.      Mental Status: She is alert. Mental status is at baseline.   Psychiatric:         Mood and Affect: Mood normal.         Behavior: Behavior normal.          Lines/Drains:  Lines/Drains/Airways       Active Status       None                            Lab Results: I have reviewed the following results: CBC/BMP:   .     09/29/24  0826 09/29/24  1620 09/30/24  0454   WBC 10.04  --  12.23*   HGB 7.8*   < > 9.5*  9.6*   HCT 25.0*   < > 29.0*  28.9*     --  240   SODIUM 138  --   --    K 3.8  --   --      --   --    CO2 27  --   --    BUN 35*  --   --    CREATININE 3.06*  --   --    GLUC 81  --   --    MG 2.3  --   --     < > = values in this interval not displayed.       Results from last 7 days   Lab Units 09/30/24  0454 09/29/24  1620 09/29/24  0826   WBC Thousand/uL 12.23*  --  10.04   HEMOGLOBIN g/dL 9.5*  9.6*   < > 7.8*   HEMATOCRIT % 29.0*  28.9*   < > 25.0*   PLATELETS Thousands/uL 240  --  294   SEGS PCT %  --   --  65   LYMPHO PCT %  --   --  22   MONO PCT %  --   --  8   EOS PCT %  --   --  4    < > = values in this interval not displayed.     Results from last 7 days   Lab Units 09/29/24  0826   SODIUM mmol/L 138   POTASSIUM mmol/L 3.8   CHLORIDE mmol/L 103   CO2 mmol/L 27   BUN mg/dL 35*   CREATININE mg/dL 3.06*   ANION GAP mmol/L 8   CALCIUM mg/dL 9.0   ALBUMIN g/dL 3.7   TOTAL BILIRUBIN mg/dL 0.27   ALK PHOS U/L 64   ALT U/L 23   AST U/L 15   GLUCOSE RANDOM mg/dL 81     Results from last 7 days   Lab Units 09/29/24  0826   INR  0.89     Results from last 7 days   Lab Units 09/29/24  2053 09/29/24  1610 09/28/24  1056 09/28/24  0742 09/27/24  2119 09/27/24  1608 09/27/24  1109 09/27/24  0711 09/26/24  2105 09/26/24  1533 09/26/24  1048 09/26/24  0652   POC GLUCOSE mg/dl 92 170* 126 114 152* 132 112 85 157* 106 88 78     Results from last 7 days   Lab Units 09/24/24  1110   HEMOGLOBIN A1C % 6.4*           Recent Cultures (last 7 days):         Imaging Review:  Reviewed radiology reports from this admission including: CT abdomen/pelvis.  Other Studies: EKG was reviewed.     Last 24 Hours Medication List:     Current Facility-Administered Medications:     acetaminophen (TYLENOL) tablet 650 mg, Q6H PRN    amLODIPine (NORVASC) tablet 10 mg, Daily    calcium carbonate-vitamin D 500 mg-5 mcg tablet 2 tablet, Daily With Breakfast    co-enzyme Q-10 capsule 100 mg, Daily    escitalopram (LEXAPRO) tablet 10 mg, Daily    gabapentin (NEURONTIN) capsule 100 mg, TID PRN    hydrALAZINE (APRESOLINE) tablet 25 mg, BID    insulin glargine (LANTUS) subcutaneous injection 10 Units 0.1 mL, HS    insulin lispro (HumALOG/ADMELOG) 100 units/mL subcutaneous injection 1-5 Units, 4x Daily (AC & HS) **AND** Fingerstick Glucose (POCT), 4x Daily AC and at bedtime    levothyroxine tablet 100 mcg, Early Morning    metoprolol tartrate (LOPRESSOR) tablet 50 mg, Q12H    pantoprazole (PROTONIX) EC tablet 40 mg, BID AC    pravastatin (PRAVACHOL) tablet 40 mg, Daily With Dinner    sucralfate (CARAFATE) tablet 1 g, TID With Meals    Administrative Statements   Today, Patient Was Seen By: Lynsey Weinstein MD  I have spent a total time of 30 minutes in caring for this patient on the day of the visit/encounter including Patient and family education, Counseling / Coordination of care, Documenting in the medical record, Reviewing / ordering tests, medicine, procedures  , Obtaining or reviewing history  , and Communicating with other healthcare professionals .    **Please Note: This note may have been constructed using a voice recognition system.**

## 2024-09-30 NOTE — ANESTHESIA PREPROCEDURE EVALUATION
Procedure:  COLONOSCOPY    Relevant Problems   CARDIO   (+) Atypical chest pain   (+) HLD (hyperlipidemia)   (+) Hypertension      ENDO   (+) Hypothyroidism   (+) Secondary hyperparathyroidism of renal origin (HCC)   (+) Type 2 diabetes mellitus (HCC)   (+) Type 2 diabetes mellitus with stage 4 chronic kidney disease, with long-term current use of insulin (HCC)      GI/HEPATIC   (+) Gastroesophageal reflux disease without esophagitis   (+) Lower GI bleed      /RENAL   (+) Stage 5 chronic kidney disease not on chronic dialysis (HCC)      HEMATOLOGY   (+) Acute blood loss anemia   (+) Anemia in stage 5 chronic kidney disease, not on chronic dialysis  (HCC)      MUSCULOSKELETAL   (+) Fibromyalgia      NEURO/PSYCH   (+) Fibromyalgia   (+) Gastroparesis diabeticorum  ([facility])   (+) Mild vascular dementia without behavioral disturbance, psychotic disturbance, mood disturbance, or anxiety ([facility])   (+) Reactive depression      PULMONARY   (+) COPD (chronic obstructive pulmonary disease) ([facility])             Anesthesia Plan  ASA Score- 3     Anesthesia Type- IV sedation with anesthesia with ASA Monitors.         Additional Monitors:     Airway Plan:            Plan Factors-Exercise tolerance (METS): >4 METS.    Chart reviewed.   Existing labs reviewed. Patient summary reviewed.                  Induction- intravenous.    Postoperative Plan-         Informed Consent- Anesthetic plan and risks discussed with patient.  I personally reviewed this patient with the CRNA. Discussed and agreed on the Anesthesia Plan with the CRNA..

## 2024-09-30 NOTE — ASSESSMENT & PLAN NOTE
Patient notes that last night, she noticed a bloody bowel movement and blood on the toilet paper after wiping  No other episodes since  See primary problem for more information    Plan:  Serial H&H every 8 hours  Follow-up GI recommendations

## 2024-09-30 NOTE — ASSESSMENT & PLAN NOTE
With her previous 2 admissions, she was noted to have 2 dieulafoy lesions of the colon, both of which were clipped and hemostasis was achieved  5/29/24: Single small Dieulafoy's lesion in the ascending colon  9/26/24: Single small Dieulafoy's lesion in the ascending colon 70 cm from the anal verge   At the time of discharge on 9/28, her bloody bowel movements had improved, but returned the night of   See primary problem for more information    Plan:  Will follow-up GI recommendations after colonoscopy

## 2024-09-30 NOTE — ASSESSMENT & PLAN NOTE
Lab Results   Component Value Date    EGFR 14 09/29/2024    EGFR 14 09/28/2024    EGFR 15 09/27/2024    CREATININE 3.06 (H) 09/29/2024    CREATININE 2.99 (H) 09/28/2024    CREATININE 2.83 (H) 09/27/2024     Patient has a history of anemia, with a baseline hemoglobin in the 11's (as of early August)  More recently, patient's hemoglobin has dropped to the 7s and 8s with concomitant fatigue and weakness, likely secondary to GI bleed  Creatinine is stable near baseline of 3  During her last admission, she received Venofer infusions (2)  In the ED, she was started on 1 unit of packed RBCs

## 2024-09-30 NOTE — ASSESSMENT & PLAN NOTE
Progress Note - Angelita Barclay 1964, 61 y o  male MRN: [id number]    Unit/Bed#: -01 Encounter: [id number]    Primary Care Provider: Maren Sevilla MD   Date and time admitted to hospital: 8/28/2020  3:14 PM        Urinary retention  Assessment & Plan  Required 3 straight straight catheterization,yielded 500 - 700ml urine  Mae inserted today per protocol  Started Flomax  Patient denies constipation  Will follow-up outpatient with Urology    Acute kidney injury (MARKELL) with acute tubular necrosis (ATN) ([facility] [address] )  Assessment & Plan  Likely secondary to sepsis/surgery,+/- urinary retention  Creatinine peaked to 2 32 on 9/5  Baseline appears to be 0 8-0 9  Creatinine currently stable  Patient required 3 straight catheterization,Mae inserted per protocol today  Started Flomax   Continue IV fluids  Avoid nephrotoxin and hypotension  Consulted Nephrology, appreciate input  Medically stable for discharge  Creatinine has peaked will need follow-up with Nephrology in the outpatient basis    MRSA bacteremia  Assessment & Plan  2/2 culture from 08/28/2020 growing MRSA  Likely source foot wound  Negative 2D echo  Repeat blood cultures remain negative  Continue IV daptomycin total of 4 weeks through 9/27  Weekly CBC with diff, creatinine, vancomycin level  Outpatient ID follow-up after hospital discharge  Vascular surgery, Podiatry following  ID recommendations appreciated  Given elevated creatinine still will discuss with ID about alternatives to vancomycin    Amputation of right great toe [facility])  Assessment & Plan  Now presented again with poor wound healing, wound dehiscence, possible cellulitis, osteomyelitis  Plan is as stated above under 1  And 2  PAD (peripheral artery disease) [facility])  Assessment & Plan  Patient history of recent right popliteal PTA/ stent 08/06/2020 and right hallux amputation  Now noted with osteomyelitis as evident on right foot MRI report    Cleared by vascular surgery for Subcutaneous collection distal to left antecubital fossa causing mild downstream edema noted on admission  VAS duplex UE: Superficial thrombophlebitis noted in the medial vein. No evidence of DVT    Plan:  Warm compress and elevation as needed for superficial thrombophlebitis     further debridement and/or amputation  Continue aspirin, statin, Plavix  Follow-up with vascular surgery recommendation as well as podiatry following  Hyperthyroidism  Assessment & Plan  · Diagnosed with thyroid cancer in July  · TSH 0 080  · Free T4 1 39  · Continue home dose of methimazole 5 mg daily  · Recommended close outpatient follow-up  Type 2 diabetes mellitus with diabetic polyneuropathy, with long-term current use of insulin [facility])  Assessment & Plan  Lab Results   Component Value Date    HGBA1C 8 1 (H) 08/28/2020       Recent Labs     09/12/20  1123 09/12/20  1553 09/12/20  2055 09/13/20  0607   POCGLU 218* 159* 185* 136       Blood Sugar Average: Last 72 hrs:  · (P) 151 [id number]     Uncontrolled  Hemoglobin A1c 8 1  Blood sugars improving  Continue Lantus 30 units subcu HS, Humalog 15 units t i d  Continue SSI  Continue to monitor sugar        Hyperlipidemia  Assessment & Plan  · Continue statin    Gastroesophageal reflux disease without esophagitis  Assessment & Plan  · Continue PPI    Hypertension, essential  Assessment & Plan  · Controlled  · Continue verapamil  Avalide held secondary to acute kidney injury  · Continue to monitor blood pressure closely  · BP stable      * Osteomyelitis (HCC)  Assessment & Plan  Recent history of right hallux amputation  Presented with wound dehiscence, wound cellulitis, possible osteomyelitis  2/2 blood culture from 08/28/2020 growing MRSA  Right foot MRI report reviewed and noted for osteomyelitis  Podiatry recommendations appreciated  Status post right-sided TMA  Was switched from vancomycin to  daptomycin given ATN  Due to patient's worsening creatinine order for central line by IR was placed insert of PICC line in an attempt to preserve the arm in case patient's creatinine deteriorates and were to need dialysis  Pending today    Currently afebrile, hemodynamically stable    Encourage for strict nonweightbearing to right lower extremity at this time  Continue dressing changes per podiatrist recommendation  VTE Pharmacologic Prophylaxis:   Pharmacologic: Heparin  Mechanical VTE Prophylaxis in Place: Yes    Patient Centered Rounds: I have performed bedside rounds with nursing staff today  Discussions with Specialists or Other Care Team Provider: cm, nursing    Education and Discussions with Family / Patient: pt    Time Spent for Care: 30 minutes  More than 50% of total time spent on counseling and coordination of care as described above  Current Length of Stay: 16 day(s)    Current Patient Status: Inpatient   Certification Statement: The patient will continue to require additional inpatient hospital stay due to Medically stable for discharge awaiting placement to rehab    Discharge Plan:  Medically stable for discharge awaiting placement to short-term rehab    Code Status: Level 1 - Full Code      Subjective:   Patient seen examined  No acute overnight events  Denies any shortness of breath, cough, fevers, chills, abdominal pain    Objective:     Vitals:   Temp (24hrs), Av 7 °F (36 5 °C), Min:97 7 °F (36 5 °C), Max:97 7 °F (36 5 °C)    Temp:  [97 7 °F (36 5 °C)] 97 7 °F (36 5 °C)  HR:  [80-86] 80  Resp:  [18-20] 20  BP: (152-159)/(85-90) 152/85  SpO2:  [95 %-97 %] 97 %  Body mass index is 30 89 kg/m²  Input and Output Summary (last 24 hours): Intake/Output Summary (Last 24 hours) at 2020 0957  Last data filed at 2020 0650  Gross per 24 hour   Intake 840 ml   Output 1720 ml   Net -880 ml       Physical Exam:     Physical Exam  Constitutional:       General: He is not in acute distress  Appearance: He is well-developed  He is not diaphoretic  HENT:      Head: Normocephalic and atraumatic  Nose: Nose normal       Mouth/Throat:      Pharynx: No oropharyngeal exudate  Eyes:      General: No scleral icterus       Conjunctiva/sclera: Conjunctivae normal    Neck:      Musculoskeletal: Normal range of motion and neck supple  Cardiovascular:      Rate and Rhythm: Normal rate and regular rhythm  Heart sounds: Normal heart sounds  No murmur  No friction rub  No gallop  Pulmonary:      Effort: Pulmonary effort is normal  No respiratory distress  Breath sounds: Normal breath sounds  No wheezing or rales  Chest:      Chest wall: No tenderness  Abdominal:      General: Bowel sounds are normal  There is no distension  Palpations: Abdomen is soft  Tenderness: There is no abdominal tenderness  There is no guarding  Musculoskeletal: Normal range of motion  General: No tenderness or deformity  Skin:     General: Skin is warm and dry  Findings: No erythema  Neurological:      Mental Status: He is alert and oriented to person, place, and time  Additional Data:     Labs:    Results from last 7 days   Lab Units 09/13/20  0642   WBC Thousand/uL 11 12*   HEMOGLOBIN g/dL 9 6*   HEMATOCRIT % 29 0*   PLATELETS Thousands/uL 360   NEUTROS PCT % 63   LYMPHS PCT % 22   MONOS PCT % 10   EOS PCT % 4     Results from last 7 days   Lab Units 09/13/20  0642   SODIUM mmol/L 133*   POTASSIUM mmol/L 3 5   CHLORIDE mmol/L 99*   CO2 mmol/L 28   BUN mg/dL 29*   CREATININE mg/dL 2 00*   ANION GAP mmol/L 6   CALCIUM mg/dL 9 1   GLUCOSE RANDOM mg/dL 137         Results from last 7 days   Lab Units 09/13/20  0607 09/12/20  2055 09/12/20  1553 09/12/20  1123 09/12/20  0646 09/11/20  2046 09/11/20  1505 09/11/20  1103 09/11/20  0605 09/10/20  2006 09/10/20  1549 09/10/20  1129   POC GLUCOSE mg/dl 136 185* 159* 218* 130 190* 148* 141* 125 221* 91 114                   * I Have Reviewed All Lab Data Listed Above  * Additional Pertinent Lab Tests Reviewed:  All Labs Within Last 24 Hours Reviewed    Imaging:    Imaging Reports Reviewed Today Include: na  Imaging Personally Reviewed by Myself Includes:  na    Recent Cultures (last 7 days):           Last 24 Hours Medication List:   Current Facility-Administered Medications   Medication Dose Route Frequency Provider Last Rate    aluminum-magnesium hydroxide-simethicone  30 mL Oral Q6H PRN Miranda Guerrero DPM      aspirin  81 mg Oral Daily Destin Ward      atorvastatin  40 mg Oral Daily [facility]      clopidogrel  75 mg Oral Daily Destin Ward      DAPTOmycin  6 mg/kg (Adjusted) Intravenous Q24H Fiona Aldea,  mg (09/12/20 1231)    docusate sodium  100 mg Oral BID Manuel Machado MD      heparin (porcine)  5,000 Units Subcutaneous Q8H [address] NAILA Suarez      insulin glargine  30 Units Subcutaneous HS Manuel Machado MD      insulin lispro  1-5 Units Subcutaneous 4x Daily (AC & HS) Miranda Guerrero DPM      insulin lispro  15 Units Subcutaneous TID With Meals Miranda Guerrero DPM      LORazepam  0 5 mg Oral BID PRN Miranda Guerrero DPM      methimazole  5 mg Oral Daily Destin Ward      neomycin-bacitracin-polymyxin b  1 small application Topical BID NAILA Knott      ondansetron  4 mg Intravenous Q6H PRN Miranda Guerrero DPM      oxyCODONE  10 mg Oral Q6H PRN Manuel Machado MD      oxyCODONE  5 mg Oral Q6H PRN Manuel Machado MD      pantoprazole  20 mg Oral Early Morning Miranda Guerrero DPM      polyethylene glycol  17 g Oral Daily PRN Miranda Guerrero DPM      tamsulosin  0 4 mg Oral Daily With Pamela Gilmore MD      verapamil  240 mg Oral Daily Miranda Guerrero DPM      zolpidem  10 mg Oral HS PRN Miranda Guerrero DPM          Today, Patient Was Seen By: Miranda Taveras MD    ** Please Note: Dictation voice to text software may have been used in the creation of this document   **

## 2024-09-30 NOTE — CASE MANAGEMENT
Case Management Assessment & Discharge Planning Note    Patient name Trina Davis  Location S /S -01 MRN [id number]  : 1950 Date 2024       Current Admission Date: 2024  Current Admission Diagnosis:Acute blood loss anemia   Patient Active Problem List    Diagnosis Date Noted Date Diagnosed    Hypertension 2024     Hypothyroidism 2024     Edema of extremity 2024     Atypical chest pain 2024     Dieulafoy lesion of colon 2024     Hematochezia 2024     Acute blood loss anemia 2024     Lower GI bleed 2024     Stage 5 chronic kidney disease not on chronic dialysis ([facility]) 2024     Olecranon bursitis of right elbow 2024     Reactive depression 2024     At high risk for falls 2024     Allergies 2024     Transaminitis 2024     Anemia in stage 5 chronic kidney disease, not on chronic dialysis  ([facility]) 2024     Slow transit constipation 01/10/2024     History of cerebrovascular accident (CVA) due to ischemia 10/31/2023     HLD (hyperlipidemia) 10/27/2023     History of recurrent UTIs 10/27/2023     Type 2 diabetes mellitus ([facility]) 10/27/2023     B12 deficiency 2023     Secondary hyperparathyroidism of renal origin ([facility]) 2023     Mild vascular dementia without behavioral disturbance, psychotic disturbance, mood disturbance, or anxiety ([facility]) 2023     Postural dizziness with presyncope 2023     Varicose veins of both lower extremities with pain 2023     Palpitations 2023     Seasonal allergies 2023     Fracture of orbital floor, right side, initial encounter for closed fracture ([facility]) 2023     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 2023     Osteoporosis 2022     COPD (chronic obstructive pulmonary disease) ([facility]) 2021     Mixed stress and urge urinary incontinence 2021     Gastroparesis diabeticorum  ([facility]) 2021     Thyroid  nodule 03/02/2021     Gastroesophageal reflux disease without esophagitis 07/14/2020     Leukocytosis 07/14/2020     Type 2 diabetes mellitus with stage 4 chronic kidney disease, with long-term current use of insulin (HCC) 07/14/2020     Fibromyalgia 05/27/2020     Vitamin D deficiency 05/08/2020     RLS (restless legs syndrome) 04/09/2019       LOS (days): 1  Geometric Mean LOS (GMLOS) (days):   Days to GMLOS:     OBJECTIVE:  PATIENT READMITTED TO HOSPITAL  Risk of Unplanned Readmission Score: 25.44         Current admission status: Inpatient       Preferred Pharmacy:   [id number] [place] - [address]  [address] [facility] [address]  Phone: [phone] Fax: [phone]    [id number] - [place], [place] - [address] [address]  Phone: [phone] Fax: [phone]    Primary Care Provider: Tree Aguilar MD    Primary Insurance: MEDICARE  Secondary Insurance: COMMERCIAL MISCELLANEOUS    ASSESSMENT:  Active Health Care Proxies       Bruce Davis Alternate Health Care Agent - Spouse   Primary Phone: [phone] (Mobile)  Home Phone: [phone]                           Readmission Root Cause  30 Day Readmission: Yes  During your hospital stay, did someone (provider, nurse, ) explain your care to you in a way you could understand?: Yes  Did you feel medically stable to leave the hospital?: Yes  Were you able to pay for your medication at the pharmacy?: Yes  Did you have reliable transportation to take you to your appointments?: Yes  During previous admission, was a post-acute recommendation made?: No  Patient was readmitted due to: Acute blood loss anemia  Action Plan: GI consult    Patient Information  Admitted from:: Home  Mental Status: Alert  During Assessment patient was accompanied by: Not accompanied during assessment  Assessment information provided by:: Patient  Primary Caregiver: Self  Support  Systems: Self, Spouse/significant other, Son, Children  County of Residence: [place]  What city do you live in?: [place]  Type of Current Residence: 2 story home  Upon entering residence, is there a bedroom on the main floor (no further steps)?: Yes  Upon entering residence, is there a bathroom on the main floor (no further steps)?: Yes  Living Arrangements: Lives w/ Spouse/significant other, Lives w/ Son    Activities of Daily Living Prior to Admission  Functional Status: Independent  Completes ADLs independently?: Yes  Ambulates independently?: Yes  Does patient use assisted devices?: Yes  Assisted Devices (DME) used: Walker  Does patient currently own DME?: Yes  What DME does the patient currently own?: Wheelchair, Walker, Shower Chair, Straight Cane  Does patient have a history of Outpatient Therapy (PT/OT)?: Yes  Does the patient have a history of Short-Term Rehab?: No  Does patient have a history of HHC?: No         Patient Information Continued  Income Source: Pension/nursing home  Does patient have prescription coverage?: Yes  Does patient receive dialysis treatments?: No  Does patient have a history of substance abuse?: No  Does patient have a history of Mental Health Diagnosis?: Yes (Depression)  Is patient receiving treatment for mental health?: Yes  Has patient received inpatient treatment related to mental health in the last 2 years?: No         Means of Transportation  Means of Transport to Appts:: Family transport      Social Determinants of Health (SDOH)      Flowsheet Row Most Recent Value   Housing Stability    In the last 12 months, was there a time when you were not able to pay the mortgage or rent on time? N   In the past 12 months, how many times have you moved where you were living? 0   At any time in the past 12 months, were you homeless or living in a shelter (including now)? N   Transportation Needs    In the past 12 months, has lack of transportation kept you from medical appointments or  from getting medications? no   In the past 12 months, has lack of transportation kept you from meetings, work, or from getting things needed for daily living? No   Food Insecurity    Within the past 12 months, you worried that your food would run out before you got the money to buy more. Never true   Within the past 12 months, the food you bought just didn't last and you didn't have money to get more. Never true   Utilities    In the past 12 months has the electric, gas, oil, or water company threatened to shut off services in your home? No            DISCHARGE DETAILS:    Discharge planning discussed with:: Patient  Freedom of Choice: Yes  Comments - Freedom of Choice: Pt reported preference of returning home     Were Treatment Team discharge recommendations reviewed with patient/caregiver?: Yes  Did patient/caregiver verbalize understanding of patient care needs?: Yes  Were patient/caregiver advised of the risks associated with not following Treatment Team discharge recommendations?: Yes    Contacts  Patient Contacts: Patient  Contact Method: In Person  Reason/Outcome: Continuity of Care, Discharge Planning    Other Referral/Resources/Interventions Provided:  Interventions: Other (Specify)  Referral Comments: Pt noted to be a 30 day readmission. Pt readmitted with acute blood loss anemia. CM met with pt at bedside, introduced self and role with discharge planning. Pt denied any issues with transportation, reported her spouse assists with all transportation needs. Pt reported she lives with her spouse and son in a 2 story home with [facility]. Pt reported she was independent with ADLs and functional mobility. Pt reported she uses a RW for long distances outside. Pt reported she also has a shower bench, transport chair and cane. Pt reported she has a hx of OP PT. [facility] Dr. Aguilar. Pt requested information on on MOW -- CM provided information from [employer] to pt at bedside.  CM will remain available.    Would you like to participate in our [facility] service program?  : No - Declined    Discharge Destination Plan:: Home

## 2024-10-01 VITALS
SYSTOLIC BLOOD PRESSURE: 130 MMHG | BODY MASS INDEX: 26.52 KG/M2 | DIASTOLIC BLOOD PRESSURE: 62 MMHG | OXYGEN SATURATION: 93 % | TEMPERATURE: 98.4 F | WEIGHT: 149.69 LBS | RESPIRATION RATE: 18 BRPM | HEART RATE: 55 BPM

## 2024-10-01 PROBLEM — K92.1 HEMATOCHEZIA: Status: RESOLVED | Noted: 2024-09-29 | Resolved: 2024-10-01

## 2024-10-01 PROBLEM — D72.829 LEUKOCYTOSIS: Status: RESOLVED | Noted: 2020-07-14 | Resolved: 2024-10-01

## 2024-10-01 LAB
ANION GAP SERPL CALCULATED.3IONS-SCNC: 8 MMOL/L (ref 4–13)
ATRIAL RATE: 56 BPM
BASOPHILS # BLD AUTO: 0.05 THOUSANDS/ÂΜL (ref 0–0.1)
BASOPHILS NFR BLD AUTO: 1 % (ref 0–1)
BUN SERPL-MCNC: 25 MG/DL (ref 5–25)
CALCIUM SERPL-MCNC: 8.6 MG/DL (ref 8.4–10.2)
CHLORIDE SERPL-SCNC: 103 MMOL/L (ref 96–108)
CO2 SERPL-SCNC: 26 MMOL/L (ref 21–32)
CREAT SERPL-MCNC: 3.02 MG/DL (ref 0.6–1.3)
DEPRECATED S PNEUM14 IGG SER-MCNC: 8.6 UG/ML
DEPRECATED S PNEUM19 IGG SER-MCNC: 1.6 UG/ML
DEPRECATED S PNEUM23 IGG SER-MCNC: 1.8 UG/ML
DEPRECATED S PNEUM3 IGG SER-MCNC: <0.1 UG/ML
DEPRECATED S PNEUM4 IGG SER-MCNC: 0.2 UG/ML
DEPRECATED S PNEUM8 AB SER-MCNC: 0.7 UG/ML
DEPRECATED S PNEUM9 IGG SER-MCNC: 0.6 UG/ML
EOSINOPHIL # BLD AUTO: 0.49 THOUSAND/ÂΜL (ref 0–0.61)
EOSINOPHIL NFR BLD AUTO: 5 % (ref 0–6)
ERYTHROCYTE [DISTWIDTH] IN BLOOD BY AUTOMATED COUNT: 14.9 % (ref 11.6–15.1)
FLUBV RNA SPEC QL NAA+PROBE: <0.1 UG/ML
GFR SERPL CREATININE-BSD FRML MDRD: 14 ML/MIN/1.73SQ M
GLUCOSE SERPL-MCNC: 101 MG/DL (ref 65–140)
GLUCOSE SERPL-MCNC: 111 MG/DL (ref 65–140)
GLUCOSE SERPL-MCNC: 81 MG/DL (ref 65–140)
GLUCOSE SERPL-MCNC: 95 MG/DL (ref 65–140)
HCT VFR BLD AUTO: 27.5 % (ref 34.8–46.1)
HCT VFR BLD AUTO: 30.2 % (ref 34.8–46.1)
HGB BLD-MCNC: 9 G/DL (ref 11.5–15.4)
HGB BLD-MCNC: 9.7 G/DL (ref 11.5–15.4)
IMM GRANULOCYTES # BLD AUTO: 0.07 THOUSAND/UL (ref 0–0.2)
IMM GRANULOCYTES NFR BLD AUTO: 1 % (ref 0–2)
LYMPHOCYTES # BLD AUTO: 1.57 THOUSANDS/ÂΜL (ref 0.6–4.47)
LYMPHOCYTES NFR BLD AUTO: 17 % (ref 14–44)
MCH RBC QN AUTO: 28 PG (ref 26.8–34.3)
MCHC RBC AUTO-ENTMCNC: 32.1 G/DL (ref 31.4–37.4)
MCV RBC AUTO: 87 FL (ref 82–98)
MONOCYTES # BLD AUTO: 0.69 THOUSAND/ÂΜL (ref 0.17–1.22)
MONOCYTES NFR BLD AUTO: 7 % (ref 4–12)
NEUTROPHILS # BLD AUTO: 6.61 THOUSANDS/ÂΜL (ref 1.85–7.62)
NEUTS SEG NFR BLD AUTO: 69 % (ref 43–75)
NRBC BLD AUTO-RTO: 0 /100 WBCS
P AXIS: 83 DEGREES
PLATELET # BLD AUTO: 267 THOUSANDS/UL (ref 149–390)
PMV BLD AUTO: 11.3 FL (ref 8.9–12.7)
POTASSIUM SERPL-SCNC: 3.5 MMOL/L (ref 3.5–5.3)
PR INTERVAL: 240 MS
PROCALCITONIN SERPL-MCNC: 0.1 NG/ML
QRS AXIS: -2 DEGREES
QRSD INTERVAL: 84 MS
QT INTERVAL: 444 MS
QTC INTERVAL: 428 MS
RBC # BLD AUTO: 3.46 MILLION/UL (ref 3.81–5.12)
S PNEUM DA 18C IGG SER-MCNC: 0.5 UG/ML
S PNEUM DA 19A IGG SER-MCNC: 3.1 UG/ML
S PNEUM DA 6B IGG SER-MCNC: 0.2 UG/ML
SL AMB PNEUMO AB TYPE 17 (17F): <0.1 UG/ML
SL AMB PNEUMO AB TYPE 20: 0.6 UG/ML
SL AMB PNEUMO AB TYPE 22 (22F): 0.2 UG/ML
SL AMB PNEUMO AB TYPE 2: 0.2 UG/ML
SL AMB PNEUMO AB TYPE 34 (10A): 0.2 UG/ML
SL AMB PNEUMO AB TYPE 43 (11A): 0.3 UG/ML
SL AMB PNEUMO AB TYPE 54 (15B): 0.5 UG/ML
SL AMB PNEUMO AB TYPE 5: 1.2 UG/ML
SL AMB PNEUMO AB TYPE 70 (33F): 4.4 UG/ML
SODIUM SERPL-SCNC: 137 MMOL/L (ref 135–147)
STREP PNEUMO TYPE 1: 0.5 UG/ML
STREP PNEUMO TYPE 51: 0.7 UG/ML
STREP PNEUMO TYPE 68: 0.7 UG/ML
T WAVE AXIS: 19 DEGREES
VENTRICULAR RATE: 56 BPM
WBC # BLD AUTO: 9.48 THOUSAND/UL (ref 4.31–10.16)

## 2024-10-01 PROCEDURE — 85014 HEMATOCRIT: CPT | Performed by: INTERNAL MEDICINE

## 2024-10-01 PROCEDURE — 84145 PROCALCITONIN (PCT): CPT

## 2024-10-01 PROCEDURE — 88305 TISSUE EXAM BY PATHOLOGIST: CPT | Performed by: PATHOLOGY

## 2024-10-01 PROCEDURE — 93010 ELECTROCARDIOGRAM REPORT: CPT | Performed by: INTERNAL MEDICINE

## 2024-10-01 PROCEDURE — 82948 REAGENT STRIP/BLOOD GLUCOSE: CPT

## 2024-10-01 PROCEDURE — 99232 SBSQ HOSP IP/OBS MODERATE 35: CPT | Performed by: INTERNAL MEDICINE

## 2024-10-01 PROCEDURE — 85018 HEMOGLOBIN: CPT | Performed by: INTERNAL MEDICINE

## 2024-10-01 PROCEDURE — 85025 COMPLETE CBC W/AUTO DIFF WBC: CPT

## 2024-10-01 PROCEDURE — 80048 BASIC METABOLIC PNL TOTAL CA: CPT

## 2024-10-01 PROCEDURE — 99239 HOSP IP/OBS DSCHRG MGMT >30: CPT | Performed by: HOSPITALIST

## 2024-10-01 RX ORDER — CALCIUM CARBONATE 500 MG/1
500 TABLET, CHEWABLE ORAL ONCE
Status: COMPLETED | OUTPATIENT
Start: 2024-10-01 | End: 2024-10-01

## 2024-10-01 RX ADMIN — GABAPENTIN 100 MG: 100 CAPSULE ORAL at 08:29

## 2024-10-01 RX ADMIN — ACETAMINOPHEN 650 MG: 325 TABLET ORAL at 08:29

## 2024-10-01 RX ADMIN — METOPROLOL TARTRATE 50 MG: 50 TABLET, FILM COATED ORAL at 13:45

## 2024-10-01 RX ADMIN — METOPROLOL TARTRATE 50 MG: 50 TABLET, FILM COATED ORAL at 02:26

## 2024-10-01 RX ADMIN — PANTOPRAZOLE SODIUM 40 MG: 40 TABLET, DELAYED RELEASE ORAL at 05:57

## 2024-10-01 RX ADMIN — LEVOTHYROXINE SODIUM 100 MCG: 100 TABLET ORAL at 05:57

## 2024-10-01 RX ADMIN — SUCRALFATE 1 G: 1 TABLET ORAL at 08:30

## 2024-10-01 RX ADMIN — AMLODIPINE BESYLATE 10 MG: 10 TABLET ORAL at 08:30

## 2024-10-01 RX ADMIN — HYDRALAZINE HYDROCHLORIDE 25 MG: 25 TABLET ORAL at 08:30

## 2024-10-01 RX ADMIN — Medication 100 MG: at 08:30

## 2024-10-01 RX ADMIN — Medication 2 TABLET: at 08:30

## 2024-10-01 RX ADMIN — CALCIUM CARBONATE (ANTACID) CHEW TAB 500 MG 500 MG: 500 CHEW TAB at 03:47

## 2024-10-01 RX ADMIN — SUCRALFATE 1 G: 1 TABLET ORAL at 13:45

## 2024-10-01 NOTE — ASSESSMENT & PLAN NOTE
Lab Results   Component Value Date    HGBA1C 6.4 (H) 09/24/2024       Recent Labs     09/30/24  1622 09/30/24  2154 10/01/24  0725 10/01/24  1150   POCGLU 105 98 95 111       Blood Sugar Average: Last 72 hrs:  (P) 107.375  Blood glucose has been stable  Previously, patient was on 5 units of mealtime insulin at breakfast and lunch, and 12 units of mealtime insulin at dinnertime. She was taking insulin detemir 20 units at bedtime  During her previous hospitalization, her blood sugars were well-controlled and she was taken off mealtime insulin, placed on 10 units of long-acting insulin at bedtime, and placed on sliding scale insulin with good control    Plan:  Continue sliding scale insulin, 10 units of long-acting insulin at bedtime while in hospital  Monitor blood sugars with her diet, can advance to her home regimen once she is tolerating p.o. intake  Follow-up with her endocrinologist appointment 11/6/2024

## 2024-10-01 NOTE — ASSESSMENT & PLAN NOTE
Stable on pantoprazole 40 mg BID and carafate 1 g TID  History of hiatal hernia noted on imaging    Plan:  Continue pantoprazole 40 mg BID and carafate 1 g TID

## 2024-10-01 NOTE — ASSESSMENT & PLAN NOTE
Home regimen: amlodipine 10 mg QD, hydralzine 25 mg BID, lopressor 50 mg BID, torsemide 10 mg daily except for M/W/F 20 mg. Patient reports that she has been taking 20 mg torsemide daily for the past week or so due to swelling.   Follow-up with primary care provider

## 2024-10-01 NOTE — ASSESSMENT & PLAN NOTE
"Lab Results   Component Value Date    HGB 9.0 (L) 10/01/2024    HGB 11.4 (L) 09/30/2024    HGB 9.9 (L) 09/30/2024    HGB 9.6 (L) 09/30/2024    HGB 9.5 (L) 09/30/2024    HGB 7.3 (L) 09/29/2024    HGB 7.8 (L) 09/29/2024    HGB 7.9 (L) 09/28/2024    HGB 7.1 (L) 09/28/2024    HGB 7.4 (L) 09/27/2024     Patient received 2 Venofer infusions (total of 400 mg IV) during her last hospitalization, and on discharge her hemoglobin was stable at 7.9  Baseline this past year appears to be around 11.2-11.4, drop noted to 8.7 in September   She returned to the ED after she noticed overnight that she had \"sweet potato-colored stools\" with visible blood on wiping. On day of admission she noticed bright red blood in her depends overnight, and was fatigued  She was noted to have another bloody bowel movement with bright red blood per rectum noted after admission  She received 1 unit of packed RBCs and appeared to have less pallor post-infusion. Hemoglobin improved from 7.8 to 9.9 the morning after blood transfusion   Gastroenterology was consulted and performed a repeat colonoscopy on 9/30.   Results from 9/30: Post polypectomy site in the ascending colon had a couple of hemoclips and some oozing. Two more hemoclips were placed to reinforce and stop the bleeding.   Previously had a colonoscopy 8/30/24 which showed several polyps removed, internal hemorrhoids, and a small Dula Cheryle lesion in ascending colon s/p endo clip x2   Repeat colonoscopy 9/26/24 was obtained due to bleeding once more, demonstrated dula cheryel lesion in ascending colon with bleed and was clipped x 2. Additional polyps removed at the time  Patient was on a full liquid diet yesterday after colonoscopy.     Plan:  Monitor H&H every 8 hours, transfuse as needed Hgb<7  Will need repeat colonoscopy in 3 years (2027)  Follow-up with GI recommendations regarding discharge instructions and diet  "

## 2024-10-01 NOTE — ASSESSMENT & PLAN NOTE
Lab Results   Component Value Date    HGBA1C 6.4 (H) 09/24/2024       Recent Labs     09/30/24  0758 09/30/24  1108 09/30/24  1622 09/30/24  2154   POCGLU 93 95 105 98       Blood Sugar Average: Last 72 hrs:  (P) 108.7529602464557378  Blood glucose has been stable  Previously, patient was on 5 units of mealtime insulin at breakfast and lunch, and 12 units of mealtime insulin at dinnertime. She was taking insulin detemir 20 units at bedtime  During her previous hospitalization, her blood sugars were well-controlled and she was taken off mealtime insulin, placed on 10 units of long-acting insulin at bedtime, and placed on sliding scale insulin with good control    Plan:  Continue sliding scale insulin, 10 units of long-acting insulin at bedtime  Monitor blood sugars with her diet, can advance to her home regimen once she is tolerating p.o. intake

## 2024-10-01 NOTE — PROGRESS NOTES
"Progress Note - Hospitalist   Name: Trina Davis 74 y.o. female I MRN: [id number]  Unit/Bed#: S -01 I Date of Admission: 9/29/2024   Date of Service: 10/1/2024 I Hospital Day: 2    Assessment & Plan  Acute blood loss anemia  Lab Results   Component Value Date    HGB 9.0 (L) 10/01/2024    HGB 11.4 (L) 09/30/2024    HGB 9.9 (L) 09/30/2024    HGB 9.6 (L) 09/30/2024    HGB 9.5 (L) 09/30/2024    HGB 7.3 (L) 09/29/2024    HGB 7.8 (L) 09/29/2024    HGB 7.9 (L) 09/28/2024    HGB 7.1 (L) 09/28/2024    HGB 7.4 (L) 09/27/2024     Patient received 2 Venofer infusions (total of 400 mg IV) during her last hospitalization, and on discharge her hemoglobin was stable at 7.9  Baseline this past year appears to be around 11.2-11.4, drop noted to 8.7 in September   She returned to the ED after she noticed overnight that she had \"sweet potato-colored stools\" with visible blood on wiping. On day of admission she noticed bright red blood in her depends overnight, and was fatigued  She was noted to have another bloody bowel movement with bright red blood per rectum noted after admission  She received 1 unit of packed RBCs and appeared to have less pallor post-infusion. Hemoglobin improved from 7.8 to 9.9 the morning after blood transfusion   Gastroenterology was consulted and performed a repeat colonoscopy on 9/30.   Results from 9/30: Post polypectomy site in the ascending colon had a couple of hemoclips and some oozing. Two more hemoclips were placed to reinforce and stop the bleeding.   Previously had a colonoscopy 8/30/24 which showed several polyps removed, internal hemorrhoids, and a small Dula Cheryle lesion in ascending colon s/p endo clip x2   Repeat colonoscopy 9/26/24 was obtained due to bleeding once more, demonstrated dula cheryle lesion in ascending colon with bleed and was clipped x 2. Additional polyps removed at the time  Patient was on a full liquid diet yesterday after colonoscopy.     Plan:  Monitor H&H every 8 hours, " transfuse as needed Hgb<7  Will need repeat colonoscopy in 3 years (2027)  Follow-up with GI recommendations regarding discharge instructions and diet  Hematochezia  Patient notes she noticed a bloody bowel movement and blood on the toilet paper after wiping before this admission. No episodes since.   Continue to monitor  See primary problem for more information  Anemia in stage 5 chronic kidney disease, not on chronic dialysis  (HCC)  Lab Results   Component Value Date    EGFR 15 09/30/2024    EGFR 14 09/29/2024    EGFR 14 09/28/2024    CREATININE 2.85 (H) 09/30/2024    CREATININE 3.06 (H) 09/29/2024    CREATININE 2.99 (H) 09/28/2024     Patient has a history of anemia, with a baseline hemoglobin in the 11's (as of early August)  More recently, patient's hemoglobin has dropped to the 7s and 8s with concomitant fatigue and weakness, likely secondary to GI bleed  Creatinine is stable near baseline of 3  During her last admission, she received Venofer infusions (2)  In the ED, she was started on 1 unit of packed RBCs    Iron panel 12/2023: normal iron saturation (21), TIBC (366), Iron (78), and UIBC (288)   Not on any iron supplementation at home   Dieulafoy lesion of colon  With her previous 2 admissions, she was noted to have 2 dieulafoy lesions of the colon, both of which were clipped and hemostasis was achieved  5/29/24: Single small Dieulafoy's lesion in the ascending colon  9/26/24: Single small Dieulafoy's lesion in the ascending colon 70 cm from the anal verge   See primary problem for more information  Gastroesophageal reflux disease without esophagitis  Stable on pantoprazole 40 mg BID and carafate 1 g TID  History of hiatal hernia noted on imaging    Plan:  Continue pantoprazole 40 mg BID and carafate 1 g TID  Type 2 diabetes mellitus (HCC)  Lab Results   Component Value Date    HGBA1C 6.4 (H) 09/24/2024       Recent Labs     09/30/24  0758 09/30/24  1108 09/30/24  1622 09/30/24  2154   POCGLU 93 95 105 98        Blood Sugar Average: Last 72 hrs:  (P) [id number]  Blood glucose has been stable  Previously, patient was on 5 units of mealtime insulin at breakfast and lunch, and 12 units of mealtime insulin at dinnertime. She was taking insulin detemir 20 units at bedtime  During her previous hospitalization, her blood sugars were well-controlled and she was taken off mealtime insulin, placed on 10 units of long-acting insulin at bedtime, and placed on sliding scale insulin with good control    Plan:  Continue sliding scale insulin, 10 units of long-acting insulin at bedtime  Monitor blood sugars with her diet, can advance to her home regimen once she is tolerating p.o. intake    HLD (hyperlipidemia)  Lipid panel 3/5/2024: Total cholesterol: 111, triglycerides: 236, HDL: 38, LDL: 26  At home she takes icosapent ethyl 2 g twice daily and Crestor 5 mg daily  Restart icosapent ethyl upon discharge    Plan:  Continue pravastatin 40 mg daily  History of cerebrovascular accident (CVA) due to ischemia  s/p TNK on 10/27/23. Had severe HA, word findings difficulty/aphasia   MRI brain 10/30/2023: Infarct involving the right putamen  Maintained on Aspirin 81 mg QD    Plan:  Holding aspirin in setting of GI bleed, restart tomorrow morning.  Patient advised to return to the ED if she is having any more additional bleeding events or blood in her stool.  Continue pravastatin 40 mg QD  Hypertension  Home regimen: amlodipine 10 mg QD, hydralzine 25 mg BID, lopressor 50 mg BID, torsemide 10 mg daily except for M/W/F 20 mg. Patient reports that she has been taking 20 mg torsemide daily for the past week or so due to swelling.   Follow-up with primary care provider  Hypothyroidism  Continue levothyroxine 100 mcg QD  Last TSH low, free T4 1.16 on 4/17/24  Edema of extremity  Subcutaneous collection distal to left antecubital fossa causing mild downstream edema noted on admission  VAS duplex UE: Superficial thrombophlebitis noted in  the medial vein. No evidence of DVT    Plan:  Warm compress and elevation as needed for superficial thrombophlebitis    Atypical chest pain  Patient reported episode of chest pain this morning lasting 30 minutes, localized to the left side of her chest and reproducible upon palpation. The chest pain went away on its own.  It rated to her shoulder.  She occasionally has this type of pain at home.    Previous workup:  Stress test 6/2023: No perfusion defects and normal ejection fraction.  Negative for ischemia after pharmacologic stress, without reproduction of symptoms.  Zio patch monitor for palpitations: Symptoms correlating with isolated ventricular ectopy and no significant arrhythmias during monitoring time.    EKG obtained was unremarkable, no acute ST changes. Troponins were negative    Plan:  Continue to monitor, resolved this morning  Leukocytosis  WBC 12k on 9/30.  Improved to 9.4K today.  She was at 16k on 9/27. She denies fever, chills, suprapubic pain, dysuria but does endorse bringing up some phlegm occasionally. No significant cough or shortness of breath.    Pro-Damian obtained, 0.10     Plan:  Continue to monitor off antibiotics      VTE Pharmacologic Prophylaxis: VTE Score: 5 held in the setting of GI bleed    Mobility:   -[facility] Achieved: 6: Walk 10 steps or more      Patient Centered Rounds: I performed bedside rounds with nursing staff today.   Discussions with Specialists or Other Care Team Provider: GI    Education and Discussions with Family / Patient: will update     Current Length of Stay: 2 day(s)  Current Patient Status: Inpatient   Certification Statement: Awaiting GI recommendations  Discharge Plan: Will discuss on rounds    Code Status: Level 3 - DNAR and DNI    Subjective   Today Mrs. Davis reports that she slept well with the addition of her home mirapex for restless legs syndrome. She denies chest pain, shortness of breath, abdominal pain, nausea, vomiting, or weakness. She had two  more bowel movements after the colonoscopy last night which she reports was brown-black. I spoke with RN who reported it was more brown than black. Patient reports some reflux, she received TUMS overnight and was going to be receiving her protonix shortly. When asked about any urinary symptoms, she said that she chronically has issues sometimes starting her stream. She reports not taking any medications for this. She said she was bringing up some yellow phlegm every now and then but denies cough/SOB or any sick contacts. She notes that her arm is slightly improved from warm compress treatment.     Objective     Vitals:   Temp (24hrs), Av.8 °F (36.6 °C), Min:96.8 °F (36 °C), Max:98.3 °F (36.8 °C)    Temp:  [96.8 °F (36 °C)-98.3 °F (36.8 °C)] 98.3 °F (36.8 °C)  HR:  [57-61] 59  Resp:  [16-20] 20  BP: (121-160)/(52-72) 135/63  SpO2:  [92 %-95 %] 95 %  Body mass index is 26.52 kg/m².     Input and Output Summary (last 24 hours):     Intake/Output Summary (Last 24 hours) at 10/1/2024 0602  Last data filed at 2024 0757  Gross per 24 hour   Intake 120 ml   Output 200 ml   Net -80 ml       Physical Exam  Constitutional:       General: She is not in acute distress.     Appearance: She is not diaphoretic.   HENT:      Head: Normocephalic.      Nose: No rhinorrhea.   Cardiovascular:      Rate and Rhythm: Regular rhythm. Bradycardia present.   Pulmonary:      Effort: Pulmonary effort is normal. No respiratory distress.      Breath sounds: No rales.      Comments: Slightly diminished breath sounds at right base. Saturating well on RA  Abdominal:      Palpations: Abdomen is soft.      Tenderness: There is no abdominal tenderness. There is no guarding or rebound.   Musculoskeletal:      Right lower leg: No edema.      Left lower leg: No edema.   Skin:     General: Skin is warm and dry.      Findings: Bruising present.      Comments: Similar to yesterday with less erythema-circumferential area of swelling in the ventral  aspect of the lower portion of the forearm with some mild proximal edema. Area just proximal to where her IV site was. Radial pulse intact. No loss of sensation distally. Some pain although improved from yesterday when palpating   Neurological:      General: No focal deficit present.      Mental Status: She is alert. Mental status is at baseline.   Psychiatric:         Mood and Affect: Mood normal.         Behavior: Behavior normal.          Lines/Drains:  Lines/Drains/Airways       Active Status       None                            Lab Results: I have reviewed the following results: CBC/BMP:   .     10/01/24  0017   HGB 9.0*   HCT 27.5*       Results from last 7 days   Lab Units 10/01/24  0017 09/30/24  0832 09/30/24  0454 09/29/24  1620 09/29/24  0826   WBC Thousand/uL  --   --  12.23*  --  10.04   HEMOGLOBIN g/dL 9.0*   < > 9.5*  9.6*   < > 7.8*   HEMATOCRIT % 27.5*   < > 29.0*  28.9*   < > 25.0*   PLATELETS Thousands/uL  --   --  240  --  294   SEGS PCT %  --   --   --   --  65   LYMPHO PCT %  --   --   --   --  22   MONO PCT %  --   --   --   --  8   EOS PCT %  --   --   --   --  4    < > = values in this interval not displayed.     Results from last 7 days   Lab Units 09/30/24  0454 09/29/24  0826   SODIUM mmol/L 137 138   POTASSIUM mmol/L 3.6 3.8   CHLORIDE mmol/L 102 103   CO2 mmol/L 25 27   BUN mg/dL 28* 35*   CREATININE mg/dL 2.85* 3.06*   ANION GAP mmol/L 10 8   CALCIUM mg/dL 8.3* 9.0   ALBUMIN g/dL  --  3.7   TOTAL BILIRUBIN mg/dL  --  0.27   ALK PHOS U/L  --  64   ALT U/L  --  23   AST U/L  --  15   GLUCOSE RANDOM mg/dL 93 81     Results from last 7 days   Lab Units 09/29/24  0826   INR  0.89     Results from last 7 days   Lab Units 09/30/24  2154 09/30/24  1622 09/30/24  1108 09/30/24  0758 09/29/24  2053 09/29/24  1610 09/28/24  1056 09/28/24  0742 09/27/24  2119 09/27/24  1608 09/27/24  1109 09/27/24  0711   POC GLUCOSE mg/dl 98 105 95 93 92 170* 126 114 152* 132 112 85     Results from last 7  days   Lab Units 09/24/24  1110   HEMOGLOBIN A1C % 6.4*           Recent Cultures (last 7 days):         Imaging Review: Reviewed radiology reports from this admission including: CT abdomen/pelvis and Ultrasound(s).  Other Studies: EKG was reviewed.     Last 24 Hours Medication List:     Current Facility-Administered Medications:     acetaminophen (TYLENOL) tablet 650 mg, Q4H PRN    amLODIPine (NORVASC) tablet 10 mg, Daily    calcium carbonate-vitamin D 500 mg-5 mcg tablet 2 tablet, Daily With Breakfast    co-enzyme Q-10 capsule 100 mg, Daily    gabapentin (NEURONTIN) capsule 100 mg, Q6H PRN    hydrALAZINE (APRESOLINE) tablet 25 mg, BID    insulin glargine (LANTUS) subcutaneous injection 10 Units 0.1 mL, HS    insulin lispro (HumALOG/ADMELOG) 100 units/mL subcutaneous injection 1-5 Units, 4x Daily (AC & HS) **AND** Fingerstick Glucose (POCT), 4x Daily AC and at bedtime    levothyroxine tablet 100 mcg, Early Morning    metoprolol tartrate (LOPRESSOR) tablet 50 mg, Q12H    pantoprazole (PROTONIX) EC tablet 40 mg, BID AC    pramipexole (MIRAPEX) tablet 0.25 mg, HS    pravastatin (PRAVACHOL) tablet 40 mg, Daily With Dinner    sucralfate (CARAFATE) tablet 1 g, TID With Meals    Administrative Statements   Today, Patient Was Seen By: Lynsey Weinstein MD  I have spent a total time of 30 minutes in caring for this patient on the day of the visit/encounter including Diagnostic results, Patient and family education, Counseling / Coordination of care, Documenting in the medical record, Reviewing / ordering tests, medicine, procedures  , Obtaining or reviewing history  , and Communicating with other healthcare professionals .    **Please Note: This note may have been constructed using a voice recognition system.**

## 2024-10-01 NOTE — ASSESSMENT & PLAN NOTE
Patient notes she noticed a bloody bowel movement and blood on the toilet paper after wiping before this admission. No episodes since.   Continue to monitor  See primary problem for more information

## 2024-10-01 NOTE — ASSESSMENT & PLAN NOTE
WBC 12k on 9/30.  Improved to 9.4K today.  She was at 16k on 9/27. She denies fever, chills, suprapubic pain, dysuria but does endorse bringing up some phlegm occasionally. No significant cough or shortness of breath.    Pro-Damian obtained, 0.10     Plan:  Continue to monitor off antibiotics

## 2024-10-01 NOTE — DISCHARGE INSTR - AVS FIRST PAGE
Dear Trina Davis,     It was our pleasure to care for you here at [facility].  It is our hope that we were always able to exceed the expected standards for your care during your stay.  You were hospitalized due to blood in your stool and low hemoglobin.  The GI team performed a colonoscopy and they found some bleeding near one of the regions where polyp was removed, they were able to place more clips to control the bleeding.  You were cared for on the medicine floor by Lynsey Weinstein MD under the service of Liborio Julio MD with the [facility] who covers for your primary care physician (PCP), Tree Aguilar MD, while you were hospitalized.  If you have any questions or concerns related to this hospitalization, you may contact us at .  For follow up as well as any medication refills, we recommend that you follow up with your primary care physician.  A registered nurse will reach out to you by phone within a few days after your discharge to answer any additional questions that you may have after going home.  However, at this time we provide for you here, the most important instructions / recommendations at discharge:     Testing Required after Discharge -   Please get another colonoscopy three years from now (9/2027)  Important follow up information -   Please attend your appointment with Family Medicine on 10/08/24 at 9:40 AM.  Please attend your appointment with [facility] on 10/22/24 at 11:00 AM.  Other Instructions -   Please continue to follow a healthy lifestyle.  If you notice any more blood in your stool, blood in your urine, chest pain, shortness of breath, fatigue, nausea, or vomiting please report to the emergency department.  Please review this entire after visit summary as additional general instructions including medication list, appointments, activity, diet, any pertinent wound care, and other additional  recommendations from your care team that may be provided for you.      Sincerely,     Lynsey Weinstein MD

## 2024-10-01 NOTE — ASSESSMENT & PLAN NOTE
"Lab Results   Component Value Date    HGB 9.7 (L) 10/01/2024    HGB 9.0 (L) 10/01/2024    HGB 11.4 (L) 09/30/2024    HGB 9.9 (L) 09/30/2024    HGB 9.6 (L) 09/30/2024    HGB 9.5 (L) 09/30/2024    HGB 7.3 (L) 09/29/2024    HGB 7.8 (L) 09/29/2024    HGB 7.9 (L) 09/28/2024    HGB 7.1 (L) 09/28/2024     Patient received 2 Venofer infusions (total of 400 mg IV) during her last hospitalization, and on discharge her hemoglobin was stable at 7.9  Baseline this past year appears to be around 11.2-11.4, drop noted to 8.7 in September   She returned to the ED after she noticed overnight that she had \"sweet potato-colored stools\" with visible blood on wiping. On day of admission she noticed bright red blood in her depends overnight, and was fatigued  She was noted to have another bloody bowel movement with bright red blood per rectum noted after admission  She received 1 unit of packed RBCs and appeared to have less pallor post-infusion. Hemoglobin improved from 7.8 to 9.9 the morning after blood transfusion   Gastroenterology was consulted and performed a repeat colonoscopy on 9/30.   Results from 9/30: Post polypectomy site in the ascending colon had a couple of hemoclips and some oozing. Two more hemoclips were placed to reinforce and stop the bleeding.   Previously had a colonoscopy 8/30/24 which showed several polyps removed, internal hemorrhoids, and a small Dula Chreyle lesion in ascending colon s/p endo clip x2   Repeat colonoscopy 9/26/24 was obtained due to bleeding once more, demonstrated dula cheryle lesion in ascending colon with bleed and was clipped x 2. Additional polyps removed at the time    Patient was on a full liquid diet yesterday after colonoscopy, can advance diet as tolerated today per GI    Plan:  We have been monitoring her hemoglobin every 8 hours.  Her most recent level was 9.7.  Will need repeat colonoscopy in 3 years (2027) per GI  Follow-up with GI appointment made for 10/22/24. She has an appointment " with her PCP 10/8/24 as well.   No special diet restrictions per GI

## 2024-10-01 NOTE — ASSESSMENT & PLAN NOTE
-Clipped during colonoscopy on August 30, 2024  -Clipped again during colonoscopy on September 26, 2024. 2 tubular adenoma polyps removed.    Acute blood loss anemia  -Hemoglobin is relatively stable 7.9-7.8 on admission  -She received 2 units PRBC 9/29  -Colonoscopy repeated September 30, 2024 with post polypectomy site with a couple of hemoclips and oozing from site. 2 more clips placed to reinforce and stop the bleeding in the ascending colon  -Hgb 9.7 this AM, stable  -Ok to advance diet   -Continue to monitor for recurrent bleeding  -If pt continues to remain stable with no recurrent bleeding then can likely be discharged later today vs tomorrow morning.Will discuss with attending.

## 2024-10-01 NOTE — ASSESSMENT & PLAN NOTE
Home regimen: amlodipine 10 mg QD, hydralzine 25 mg BID, lopressor 50 mg BID, torsemide 10 mg daily except for M/W/F 20 mg. Patient reports that she has been taking 20 mg torsemide daily for the past week or so due to swelling.  She was advised to take it as prescribed and follow-up with primary care provider

## 2024-10-01 NOTE — ASSESSMENT & PLAN NOTE
s/p TNK on 10/27/23. Had severe HA, word findings difficulty/aphasia   MRI brain 10/30/2023: Infarct involving the right putamen  Maintained on Aspirin 81 mg QD    Plan:  We held aspirin in the setting of suspected GI bleed, she can restart it tomorrow. Patient advised to return to the ED if she is having any more additional bleeding events or blood in her stool.  Continue pravastatin 40 mg QD

## 2024-10-01 NOTE — DISCHARGE SUMMARY
"Discharge Summary - Hospitalist   Name: Trina Davis 74 y.o. female I MRN: [id number]  Unit/Bed#: S -01 I Date of Admission: 9/29/2024   Date of Service: 10/1/2024 I Hospital Day: 2     Assessment & Plan  Acute blood loss anemia  Lab Results   Component Value Date    HGB 9.7 (L) 10/01/2024    HGB 9.0 (L) 10/01/2024    HGB 11.4 (L) 09/30/2024    HGB 9.9 (L) 09/30/2024    HGB 9.6 (L) 09/30/2024    HGB 9.5 (L) 09/30/2024    HGB 7.3 (L) 09/29/2024    HGB 7.8 (L) 09/29/2024    HGB 7.9 (L) 09/28/2024    HGB 7.1 (L) 09/28/2024     Patient received 2 Venofer infusions (total of 400 mg IV) during her last hospitalization, and on discharge her hemoglobin was stable at 7.9  Baseline this past year appears to be around 11.2-11.4, drop noted to 8.7 in September   She returned to the ED after she noticed overnight that she had \"sweet potato-colored stools\" with visible blood on wiping. On day of admission she noticed bright red blood in her depends overnight, and was fatigued  She was noted to have another bloody bowel movement with bright red blood per rectum noted after admission  She received 1 unit of packed RBCs and appeared to have less pallor post-infusion. Hemoglobin improved from 7.8 to 9.9 the morning after blood transfusion   Gastroenterology was consulted and performed a repeat colonoscopy on 9/30.   Results from 9/30: Post polypectomy site in the ascending colon had a couple of hemoclips and some oozing. Two more hemoclips were placed to reinforce and stop the bleeding.   Previously had a colonoscopy 8/30/24 which showed several polyps removed, internal hemorrhoids, and a small Dula Cheryle lesion in ascending colon s/p endo clip x2   Repeat colonoscopy 9/26/24 was obtained due to bleeding once more, demonstrated dula cheryle lesion in ascending colon with bleed and was clipped x 2. Additional polyps removed at the time    Patient was on a full liquid diet yesterday after colonoscopy, can advance diet as tolerated " today per GI    Plan:  We have been monitoring her hemoglobin every 8 hours.  Her most recent level was 9.7.  Will need repeat colonoscopy in 3 years (2027) per GI  Follow-up with GI appointment made for 10/22/24. She has an appointment with her PCP 10/8/24 as well.   No special diet restrictions per GI  Anemia in stage 5 chronic kidney disease, not on chronic dialysis  (HCC)  Lab Results   Component Value Date    EGFR 14 10/01/2024    EGFR 15 09/30/2024    EGFR 14 09/29/2024    CREATININE 3.02 (H) 10/01/2024    CREATININE 2.85 (H) 09/30/2024    CREATININE 3.06 (H) 09/29/2024     Patient has a history of anemia, with a baseline hemoglobin in the 11's (as of early August)  More recently, patient's hemoglobin has dropped to the 7s and 8s with concomitant fatigue and weakness, likely secondary to GI bleed  Creatinine is stable near baseline of 3  During her last admission, she received Venofer infusions (2)  In the ED, she was given 1 unit of packed RBCs    Iron panel 12/2023: normal iron saturation (21), TIBC (366), Iron (78), and UIBC (288)   Not on any iron supplementation at home, close follow-up with PCP for anemia in the setting of CKD  Dieulafoy lesion of colon  With her previous 2 admissions, she was noted to have 2 dieulafoy lesions of the colon, both of which were clipped and hemostasis was achieved  5/29/24: Single small Dieulafoy's lesion in the ascending colon  9/26/24: Single small Dieulafoy's lesion in the ascending colon 70 cm from the anal verge   See primary problem for more information  Gastroesophageal reflux disease without esophagitis  Stable on pantoprazole 40 mg BID and carafate 1 g TID  History of hiatal hernia noted on imaging    Plan:  Continue pantoprazole 40 mg BID and carafate 1 g TID  Type 2 diabetes mellitus (HCC)  Lab Results   Component Value Date    HGBA1C 6.4 (H) 09/24/2024       Recent Labs     09/30/24  1622 09/30/24  2154 10/01/24  0725 10/01/24  1150   POCGLU 105 98 95 111        Blood Sugar Average: Last 72 hrs:  (P) 107.375  Blood glucose has been stable  Previously, patient was on 5 units of mealtime insulin at breakfast and lunch, and 12 units of mealtime insulin at dinnertime. She was taking insulin detemir 20 units at bedtime  During her previous hospitalization, her blood sugars were well-controlled and she was taken off mealtime insulin, placed on 10 units of long-acting insulin at bedtime, and placed on sliding scale insulin with good control    Plan:  Continue sliding scale insulin, 10 units of long-acting insulin at bedtime while in hospital  Monitor blood sugars with her diet, can advance to her home regimen once she is tolerating p.o. intake  Follow-up with her endocrinologist appointment 11/6/2024    HLD (hyperlipidemia)  Lipid panel 3/5/2024: Total cholesterol: 111, triglycerides: 236, HDL: 38, LDL: 26  At home she takes icosapent ethyl 2 g twice daily and Crestor 5 mg daily  Restart icosapent ethyl upon discharge    Plan:  Continue pravastatin 40 mg daily and icosapent ethyl per cardiologist for elevated triglycerides  History of cerebrovascular accident (CVA) due to ischemia  s/p TNK on 10/27/23. Had severe HA, word findings difficulty/aphasia   MRI brain 10/30/2023: Infarct involving the right putamen  Maintained on Aspirin 81 mg QD    Plan:  We held aspirin in the setting of suspected GI bleed, she can restart it tomorrow. Patient advised to return to the ED if she is having any more additional bleeding events or blood in her stool.  Continue pravastatin 40 mg QD  Hypertension  Home regimen: amlodipine 10 mg QD, hydralzine 25 mg BID, lopressor 50 mg BID, torsemide 10 mg daily except for M/W/F 20 mg. Patient reports that she has been taking 20 mg torsemide daily for the past week or so due to swelling.  She was advised to take it as prescribed and follow-up with primary care provider  Hypothyroidism  Continue levothyroxine 100 mcg QD  Last TSH low, free T4 1.16 on  4/17/24  Edema of extremity  Subcutaneous collection distal to left antecubital fossa causing mild downstream edema noted on admission  VAS duplex UE: Superficial thrombophlebitis noted in the medial vein. No evidence of DVT    Plan:  Warm compress and elevation as needed for superficial thrombophlebitis  Atypical chest pain  Patient reported episode of chest pain this morning lasting 30 minutes, localized to the left side of her chest and reproducible upon palpation. The chest pain went away on its own.  It rated to her shoulder.  She occasionally has this type of pain at home.    Previous workup:  Stress test 6/2023: No perfusion defects and normal ejection fraction.  Negative for ischemia after pharmacologic stress, without reproduction of symptoms.  Zio patch monitor for palpitations: Symptoms correlating with isolated ventricular ectopy and no significant arrhythmias during monitoring time.    EKG obtained was unremarkable, no acute ST changes. Troponins were negative    Plan:  Continue to monitor, resolved   Follow-up with PCP to rule out other etiologies such as GERD or musculoskeletal pain  Advised patient to return to the ER if she develops worsening sudden chest pain     Medical Problems       Resolved Problems  Date Reviewed: 9/30/2024            Resolved    Leukocytosis 10/1/2024     Resolved by  Lynsey Weinstein MD    Hematochezia 10/1/2024     Resolved by  Lynsey Weinstein MD        Discharging Physician / Practitioner: Lynsey Weinstein MD  PCP: Tree Aguilar MD  Admission Date:   Admission Orders (From admission, onward)       Ordered        09/29/24 1124  INPATIENT ADMISSION  Once                          Discharge Date: 10/01/24    Consultations During Hospital Stay:  Gastroenterology    Procedures Performed:   Colonoscopy 9/30    Significant Findings / Test Results:   Colonoscopy 9/30: Cecum appeared normal.  The post polypectomy site in the ascending colon demonstrated some oozing from the site.  2 more  hemoclips were placed to reinforce and stop the bleeding.  Upper limb duplex scan 9/30: No evidence of acute or chronic DVT.  Superficial thrombophlebitis noted in the left medial vein    Incidental Findings:   No incidental findings this admission    Test Results Pending at Discharge (will require follow up):   None     Outpatient Tests Requested:  Repeat colonoscopy in 3 years, due 9/30/2027    Complications: Superficial thrombophlebitis in the left forearm    Reason for Admission: Acute blood loss anemia    Hospital Course:   Trina Davis is a 74 y.o. female patient who originally presented to the hospital on 9/29/2024 due to acute blood loss anemia.  She recently had a history of dieulafoy lesions with clips placed.  She presented with melena in addition to lightheadedness.  Initial hemoglobin was 7.8.  She requested blood transfusion for symptomatic anemia, gastroenterology team was consulted.  Patient received 1 unit of packed red blood cells with hemoglobin improvement to 9.9 the following morning.  She underwent colonoscopy on 9/30 and it was noted that the post polypectomy site in the ascending colon from last admission demonstrated some oozing from the site.  Two additional hemoclips were placed to reinforce and stop the bleeding.  On 9/30 morning, patient had an episode of atypical chest pain.  An EKG and troponins were ordered to rule out any acute coronary syndrome.  This testing was unremarkable and later the chest pain subsided and did not reoccur.  Throughout her admission, patient remained hemodynamically stable.  During her stay, her aspirin was held in the setting of suspected GI loss.  We counseled the patient that she can continue her aspirin tomorrow but with close monitoring for any blood in her bowels or urine.  She is to report back to the emergency department if she notices any additional blood in her stool, worsening fatigue, or worsening dizziness.  On 9/30 morning, it was noted that  patient had left upper extremity swelling so a duplex scan was ordered which did not show any DVTs.  Scan was notable for superficial thrombophlebitis in her left forearm.  Patient was counseled to elevate her arm and continue using warm compresses which help with the pain.  Patient tolerated a full liquid diet after her colonoscopy and was cleared to advance her diet as tolerated.  She was recommended to follow-up with her family medicine office, appointment scheduled for 10/8/2024.  She is to attend her appointment with gastroenterology on 10/22/2024.      Please see above list of diagnoses and related plan for additional information.     Condition at Discharge: good    Discharge Day Visit / Exam:   * Please refer to separate progress note for these details *    Discussion with Family: Updated  () at bedside.    Discharge instructions/Information to patient and family:   See after visit summary for information provided to patient and family.      Provisions for Follow-Up Care:  See after visit summary for information related to follow-up care and any pertinent home health orders.      Mobility at time of Discharge:   JH-HLM Achieved: 4: Move to chair/commode  HLM Goal achieved. Continue to encourage appropriate mobility.     Disposition:   Home    Planned Readmission: No    Discharge Medications:  See after visit summary for reconciled discharge medications provided to patient and/or family.      Administrative Statements   Discharge Statement:  I have spent a total time of 30 minutes in caring for this patient on the day of the visit/encounter. >30 minutes of time was spent on: Diagnostic results, Instructions for management, Patient and family education, Importance of tx compliance, Risk factor reductions, Counseling / Coordination of care, Documenting in the medical record, and Communicating with other healthcare professionals .    **Please Note: This note may have been constructed using a  voice recognition system**

## 2024-10-01 NOTE — PROGRESS NOTES
Progress Note - Gastroenterology   Name: Trina Davis 74 y.o. female I MRN: [id number]  Unit/Bed#: S -01 I Date of Admission: 9/29/2024   Date of Service: 10/1/2024 I Hospital Day: 2       74 y.o. female with PMH of DM, COPD and chronic kidney disease V presenting to the emergency room after discharge on Saturday, September 28, 2024 with rectal bleeding.   Assessment & Plan  Dieulafoy lesion of colon  -Clipped during colonoscopy on August 30, 2024  -Clipped again during colonoscopy on September 26, 2024. 2 tubular adenoma polyps removed.    Acute blood loss anemia  -Hemoglobin is relatively stable 7.9-7.8 on admission  -She received 2 units PRBC 9/29  -Colonoscopy repeated September 30, 2024 with post polypectomy site with a couple of hemoclips and oozing from site. 2 more clips placed to reinforce and stop the bleeding in the ascending colon  -Hgb 9.7 this AM, stable  -Ok to advance diet   -Continue to monitor for recurrent bleeding  -If pt continues to remain stable with no recurrent bleeding then can likely be discharged later today vs tomorrow morning.Will discuss with attending.       Subjective:     Pt with  at bedside. Reports a couple brown stools after colonoscopy yesterday. She tolerated full liquid diet today. Denies any abdominal pain.     Medication Administration - last 24 hours from 09/30/2024 1112 to 10/01/2024 1112         Date/Time Order Dose Route Action Action by     10/01/2024 0830 EDT amLODIPine (NORVASC) tablet 10 mg 10 mg Oral Given Delia Nguyen     09/30/2024 2156 EDT insulin glargine (LANTUS) subcutaneous injection 10 Units 0.1 mL 10 Units Subcutaneous Given Prema Medina     10/01/2024 0820 EDT insulin lispro (HumALOG/ADMELOG) 100 units/mL subcutaneous injection 1-5 Units -- Subcutaneous Not Given Delia Nguyen     09/30/2024 2156 EDT insulin lispro (HumALOG/ADMELOG) 100 units/mL subcutaneous injection 1-5 Units -- Subcutaneous Not Given Prema Medina      09/30/2024 1626 EDT insulin lispro (HumALOG/ADMELOG) 100 units/mL subcutaneous injection 1-5 Units -- Subcutaneous Not Given Elvira Bell RN     09/30/2024 1139 EDT insulin lispro (HumALOG/ADMELOG) 100 units/mL subcutaneous injection 1-5 Units -- Subcutaneous Not Given Elvira Bell RN     10/01/2024 0830 EDT hydrALAZINE (APRESOLINE) tablet 25 mg 25 mg Oral Given Delia Nguyen     09/30/2024 2155 EDT hydrALAZINE (APRESOLINE) tablet 25 mg 25 mg Oral Given [facility]     10/01/2024 0557 EDT levothyroxine tablet 100 mcg 100 mcg Oral Given [facility]     10/01/2024 0226 EDT metoprolol tartrate (LOPRESSOR) tablet 50 mg 50 mg Oral Given [facility]     09/30/2024 1624 EDT metoprolol tartrate (LOPRESSOR) tablet 50 mg 50 mg Oral Given Elvira Bell RN     10/01/2024 0557 EDT pantoprazole (PROTONIX) EC tablet 40 mg 40 mg Oral Given [facility]     09/30/2024 1625 EDT pantoprazole (PROTONIX) EC tablet 40 mg 40 mg Oral Given Elvira Bell RN     09/30/2024 1625 EDT pravastatin (PRAVACHOL) tablet 40 mg 40 mg Oral Given Elvira Bell RN     10/01/2024 0830 EDT sucralfate (CARAFATE) tablet 1 g 1 g Oral Given Delia Nguyen     09/30/2024 1625 EDT sucralfate (CARAFATE) tablet 1 g 1 g Oral Given Elvira Bell RN     09/30/2024 1215 EDT sucralfate (CARAFATE) tablet 1 g 1 g Oral Given Elvira Bell RN     10/01/2024 0830 EDT calcium carbonate-vitamin D 500 mg-5 mcg tablet 2 tablet 2 tablet Oral Given Delia Nguyen     10/01/2024 0830 EDT co-enzyme Q-10 capsule 100 mg 100 mg Oral Given Delia Nguyen     09/30/2024 1512 EDT lactated ringers infusion -- Intravenous Restarted Marianne Azar CRNA     09/30/2024 1442 EDT lactated ringers infusion -- Intravenous Continue from Pre-op Marianne Azar CRNA     10/01/2024 0829 EDT acetaminophen (TYLENOL) tablet 650 mg 650 mg Oral Given Delia Nguyen     09/30/2024 1822 EDT acetaminophen (TYLENOL) tablet 650 mg 650 mg Oral Given Elvira Bell RN     09/30/2024  1456 EDT simethicone (MYLICON) oral suspension 40 mg Oral Given Malcolm Vazquez MD     09/30/2024 2155 EDT pramipexole (MIRAPEX) tablet 0.25 mg 0.25 mg Oral Given Prema Medina     10/01/2024 0829 EDT gabapentin (NEURONTIN) capsule 100 mg 100 mg Oral Given Delia Nguyen     09/30/2024 1822 EDT gabapentin (NEURONTIN) capsule 100 mg 100 mg Oral Given Elvira Bell RN     10/01/2024 0347 EDT calcium carbonate (TUMS) chewable tablet 500 mg 500 mg Oral Given Prema Medina            Objective:     Vitals: Blood pressure 131/58, pulse 59, temperature 98.4 °F (36.9 °C), resp. rate 16, weight 67.9 kg (149 lb 11.1 oz), SpO2 93%.,Body mass index is 26.52 kg/m².      Intake/Output Summary (Last 24 hours) at 10/1/2024 1112  Last data filed at 10/1/2024 0900  Gross per 24 hour   Intake 840 ml   Output --   Net 840 ml       Physical Exam:   General Appearance: Awake and alert, in no acute distress  Abdomen: Soft, non-tender, non-distended; bowel sounds normal; no masses or no organomegaly    Invasive Devices       Peripheral Intravenous Line  Duration             Peripheral IV 09/29/24 Right Antecubital 2 days    Peripheral IV 09/29/24 Right;Ventral (anterior) Forearm 2 days                    Lab Results:  Admission on 09/29/2024   Component Date Value    WBC 09/29/2024 10.04     RBC 09/29/2024 2.89 (L)     Hemoglobin 09/29/2024 7.8 (L)     Hematocrit 09/29/2024 25.0 (L)     MCV 09/29/2024 87     MCH 09/29/2024 27.0     MCHC 09/29/2024 31.2 (L)     RDW 09/29/2024 14.5     MPV 09/29/2024 10.7     Platelets 09/29/2024 294     nRBC 09/29/2024 0     Segmented % 09/29/2024 65     Immature Grans % 09/29/2024 1     Lymphocytes % 09/29/2024 22     Monocytes % 09/29/2024 8     Eosinophils Relative 09/29/2024 4     Basophils Relative 09/29/2024 0     Absolute Neutrophils 09/29/2024 6.52     Absolute Immature Grans 09/29/2024 0.13     Absolute Lymphocytes 09/29/2024 2.19     Absolute Monocytes 09/29/2024 0.80     Eosinophils  Absolute 09/29/2024 0.36     Basophils Absolute 09/29/2024 0.04     Sodium 09/29/2024 138     Potassium 09/29/2024 3.8     Chloride 09/29/2024 103     CO2 09/29/2024 27     ANION GAP 09/29/2024 8     BUN 09/29/2024 35 (H)     Creatinine 09/29/2024 3.06 (H)     Glucose 09/29/2024 81     Calcium 09/29/2024 9.0     AST 09/29/2024 15     ALT 09/29/2024 23     Alkaline Phosphatase 09/29/2024 64     Total Protein 09/29/2024 6.2 (L)     Albumin 09/29/2024 3.7     Total Bilirubin 09/29/2024 0.27     eGFR 09/29/2024 14     Protime 09/29/2024 12.8     INR 09/29/2024 0.89     PTT 09/29/2024 32     ABO Grouping 09/29/2024 A     Rh Factor 09/29/2024 Positive     Antibody Screen 09/29/2024 Negative     Specimen Expiration Date 09/29/2024 [id number]     Magnesium 09/29/2024 2.3     Unit Product Code 09/30/2024 [id number]     Unit Number 09/30/2024 [id number]     Unit ABO 09/30/2024 A     Unit RH 09/30/2024 POS     Crossmatch 09/30/2024 Compatible     Unit Dispense Status 09/30/2024 Presumed Trans     Unit Product Volume 09/30/2024 300     Hemoglobin 09/29/2024 7.3 (L)     Hematocrit 09/29/2024 23.5 (L)     POC Glucose 09/29/2024 170 (H)     Unit Product Code 09/30/2024 [id number]     Unit Number 09/30/2024 [id number]     Unit ABO 09/30/2024 A     Unit RH 09/30/2024 POS     Crossmatch 09/30/2024 Compatible     Unit Dispense Status 09/30/2024 Presumed Trans     Unit Product Volume 09/30/2024 300     POC Glucose 09/29/2024 92     Hemoglobin 09/30/2024 9.6 (L)     Hematocrit 09/30/2024 28.9 (L)     Sodium 09/30/2024 137     Potassium 09/30/2024 3.6     Chloride 09/30/2024 102     CO2 09/30/2024 25     ANION GAP 09/30/2024 10     BUN 09/30/2024 28 (H)     Creatinine 09/30/2024 2.85 (H)     Glucose 09/30/2024 93     Calcium 09/30/2024 8.3 (L)     eGFR 09/30/2024 15     WBC 09/30/2024 12.23 (H)     RBC 09/30/2024 3.41 (L)     Hemoglobin 09/30/2024 9.5 (L)     Hematocrit 09/30/2024 29.0 (L)     MCV 09/30/2024 85     MCH 09/30/2024  27.9     MCHC 09/30/2024 32.8     RDW 09/30/2024 14.1     Platelets 09/30/2024 240     MPV 09/30/2024 11.0     Hemoglobin 09/30/2024 9.9 (L)     Hematocrit 09/30/2024 30.0 (L)     POC Glucose 09/30/2024 93     Ventricular Rate 09/29/2024 56     Atrial Rate 09/29/2024 56     CA Interval 09/29/2024 238     QRSD Interval 09/29/2024 86     QT Interval 09/29/2024 464     QTC Interval 09/29/2024 447     P Axis 09/29/2024 45     QRS [place] 09/29/2024 24     T Wave [place] 09/29/2024 67     hs TnI 0hr 09/30/2024 6     POC Glucose 09/30/2024 95     Hemoglobin 09/30/2024 11.4 (L)     Hematocrit 09/30/2024 34.9     hs TnI 4hr 09/30/2024 7     Delta 4hr hsTnI 09/30/2024 1     POC Glucose 09/30/2024 105     Hemoglobin 10/01/2024 9.0 (L)     Hematocrit 10/01/2024 27.5 (L)     POC Glucose 09/30/2024 98     Sodium 10/01/2024 137     Potassium 10/01/2024 3.5     Chloride 10/01/2024 103     CO2 10/01/2024 26     ANION GAP 10/01/2024 8     BUN 10/01/2024 25     Creatinine 10/01/2024 3.02 (H)     Glucose 10/01/2024 81     Calcium 10/01/2024 8.6     eGFR 10/01/2024 14     WBC 10/01/2024 9.48     RBC 10/01/2024 3.46 (L)     Hemoglobin 10/01/2024 9.7 (L)     Hematocrit 10/01/2024 30.2 (L)     MCV 10/01/2024 87     MCH 10/01/2024 28.0     MCHC 10/01/2024 32.1     RDW 10/01/2024 14.9     MPV 10/01/2024 11.3     Platelets 10/01/2024 267     nRBC 10/01/2024 0     Segmented % 10/01/2024 69     Immature Grans % 10/01/2024 1     Lymphocytes % 10/01/2024 17     Monocytes % 10/01/2024 7     Eosinophils Relative 10/01/2024 5     Basophils Relative 10/01/2024 1     Absolute Neutrophils 10/01/2024 6.61     Absolute Immature Grans 10/01/2024 0.07     Absolute Lymphocytes 10/01/2024 1.57     Absolute Monocytes 10/01/2024 0.69     Eosinophils Absolute 10/01/2024 0.49     Basophils Absolute 10/01/2024 0.05     Procalcitonin 10/01/2024 0.10     POC Glucose 10/01/2024 95     Ventricular Rate 09/30/2024 56     Atrial Rate 09/30/2024 56     CA Interval 09/30/2024  240     QRSD Interval 09/30/2024 84     QT Interval 09/30/2024 444     QTC Interval 09/30/2024 428     P Axis 09/30/2024 83     QRS [place] 09/30/2024 -2     T Wave [place] 09/30/2024 19        Imaging Studies: I have personally reviewed pertinent imaging studies.

## 2024-10-01 NOTE — ASSESSMENT & PLAN NOTE
Subcutaneous collection distal to left antecubital fossa causing mild downstream edema noted on admission  VAS duplex UE: Superficial thrombophlebitis noted in the medial vein. No evidence of DVT    Plan:  Warm compress and elevation as needed for superficial thrombophlebitis

## 2024-10-01 NOTE — ASSESSMENT & PLAN NOTE
Lipid panel 3/5/2024: Total cholesterol: 111, triglycerides: 236, HDL: 38, LDL: 26  At home she takes icosapent ethyl 2 g twice daily and Crestor 5 mg daily  Restart icosapent ethyl upon discharge    Plan:  Continue pravastatin 40 mg daily and icosapent ethyl per cardiologist for elevated triglycerides

## 2024-10-01 NOTE — ASSESSMENT & PLAN NOTE
Lab Results   Component Value Date    EGFR 14 10/01/2024    EGFR 15 09/30/2024    EGFR 14 09/29/2024    CREATININE 3.02 (H) 10/01/2024    CREATININE 2.85 (H) 09/30/2024    CREATININE 3.06 (H) 09/29/2024     Patient has a history of anemia, with a baseline hemoglobin in the 11's (as of early August)  More recently, patient's hemoglobin has dropped to the 7s and 8s with concomitant fatigue and weakness, likely secondary to GI bleed  Creatinine is stable near baseline of 3  During her last admission, she received Venofer infusions (2)  In the ED, she was given 1 unit of packed RBCs    Iron panel 12/2023: normal iron saturation (21), TIBC (366), Iron (78), and UIBC (288)   Not on any iron supplementation at home, close follow-up with PCP for anemia in the setting of CKD

## 2024-10-01 NOTE — ASSESSMENT & PLAN NOTE
s/p TNK on 10/27/23. Had severe HA, word findings difficulty/aphasia   MRI brain 10/30/2023: Infarct involving the right putamen  Maintained on Aspirin 81 mg QD    Plan:  Holding aspirin in setting of GI bleed, restart tomorrow morning.  Patient advised to return to the ED if she is having any more additional bleeding events or blood in her stool.  Continue pravastatin 40 mg QD

## 2024-10-01 NOTE — ASSESSMENT & PLAN NOTE
Lab Results   Component Value Date    EGFR 15 09/30/2024    EGFR 14 09/29/2024    EGFR 14 09/28/2024    CREATININE 2.85 (H) 09/30/2024    CREATININE 3.06 (H) 09/29/2024    CREATININE 2.99 (H) 09/28/2024     Patient has a history of anemia, with a baseline hemoglobin in the 11's (as of early August)  More recently, patient's hemoglobin has dropped to the 7s and 8s with concomitant fatigue and weakness, likely secondary to GI bleed  Creatinine is stable near baseline of 3  During her last admission, she received Venofer infusions (2)  In the ED, she was started on 1 unit of packed RBCs    Iron panel 12/2023: normal iron saturation (21), TIBC (366), Iron (78), and UIBC (288)   Not on any iron supplementation at home

## 2024-10-01 NOTE — ASSESSMENT & PLAN NOTE
Patient reported episode of chest pain this morning lasting 30 minutes, localized to the left side of her chest and reproducible upon palpation. The chest pain went away on its own.  It rated to her shoulder.  She occasionally has this type of pain at home.    Previous workup:  Stress test 6/2023: No perfusion defects and normal ejection fraction.  Negative for ischemia after pharmacologic stress, without reproduction of symptoms.  Zio patch monitor for palpitations: Symptoms correlating with isolated ventricular ectopy and no significant arrhythmias during monitoring time.    EKG obtained was unremarkable, no acute ST changes. Troponins were negative    Plan:  Continue to monitor, resolved   Follow-up with PCP to rule out other etiologies such as GERD or musculoskeletal pain  Advised patient to return to the ER if she develops worsening sudden chest pain

## 2024-10-01 NOTE — ASSESSMENT & PLAN NOTE
With her previous 2 admissions, she was noted to have 2 dieulafoy lesions of the colon, both of which were clipped and hemostasis was achieved  5/29/24: Single small Dieulafoy's lesion in the ascending colon  9/26/24: Single small Dieulafoy's lesion in the ascending colon 70 cm from the anal verge   See primary problem for more information

## 2024-10-01 NOTE — ASSESSMENT & PLAN NOTE
Lipid panel 3/5/2024: Total cholesterol: 111, triglycerides: 236, HDL: 38, LDL: 26  At home she takes icosapent ethyl 2 g twice daily and Crestor 5 mg daily  Restart icosapent ethyl upon discharge    Plan:  Continue pravastatin 40 mg daily

## 2024-10-01 NOTE — ASSESSMENT & PLAN NOTE
Patient reported episode of chest pain this morning lasting 30 minutes, localized to the left side of her chest and reproducible upon palpation. The chest pain went away on its own.  It rated to her shoulder.  She occasionally has this type of pain at home.    Previous workup:  Stress test 6/2023: No perfusion defects and normal ejection fraction.  Negative for ischemia after pharmacologic stress, without reproduction of symptoms.  Zio patch monitor for palpitations: Symptoms correlating with isolated ventricular ectopy and no significant arrhythmias during monitoring time.    EKG obtained was unremarkable, no acute ST changes. Troponins were negative    Plan:  Continue to monitor, resolved this morning

## 2024-10-02 ENCOUNTER — TRANSITIONAL CARE MANAGEMENT (OUTPATIENT)
Dept: INTERNAL MEDICINE CLINIC | Facility: CLINIC | Age: 74
End: 2024-10-02

## 2024-10-02 DIAGNOSIS — Z71.89 COMPLEX CARE COORDINATION: Primary | ICD-10-CM

## 2024-10-03 ENCOUNTER — PATIENT OUTREACH (OUTPATIENT)
Dept: CASE MANAGEMENT | Facility: OTHER | Age: 74
End: 2024-10-03

## 2024-10-03 NOTE — PROGRESS NOTES
Received referral via in basket message as covering RN Care Manager.  Chart reviewed. Pt was admitted 9/29-10/1/24 with acute blood loss anemia, lightheadedness, melena. Had colonoscopy on 9/26 and again on 9/30/24, removal on 9/30 of 2 dieulafoy lesions.  Pt with extensive past medical history, [id number], CKD5, CVA, COPD.  Telephone call to patient, introduced self and role of RN Care Manager.  Verified pt had AVS and verbalized understanding of instructions.   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  She wears a CGM, reports she has had low blood sugars last evening and was adjusting her Insulin per sliding scale.  Was as low as 60. Pt reports she drank apple juice and pretzels. This am had scrambled eggs and toast. Blood sugar at time of call was 138. Pt is managed by FRANCISCO Phan.  Advised pt to report low blood sugars to endocrinologist. She agreed to message via mth sense and request they review past 24hours of CGM readings and advise.  Pt requests she send message to Endo. Reviewed symptoms of hypoglycemia, and when to call PCP.  Follows a low sodium diet. We reviewed foods high in iron.   Denies current symptoms of bleeding or traces of blood in urine or stool at this time.  Medication list reviewed. She verified she has all medications in the home and taking as prescribed. She has not needed to use Albuterol Inhaler. Pt also stated she is not resuming Baby Aspirin until she reviews with her PCP.  Pt is ambulatory, uses rollator outside of home, lives with spouse and son. Able to complete her own personal care and has secure transportation.   Scheduled to see PCP 10/8/24 and GI 10/22/24.  Reviewed symptoms of medical emergency and when to call PCP and or 911.  Provided telephone number of MARY GRACE Manjarrez and agrees to follow up call in one week.   Will route note to PCP to inquire if blood work due prior to appt next Tuesday.

## 2024-10-04 DIAGNOSIS — D50.0 BLOOD LOSS ANEMIA: Primary | ICD-10-CM

## 2024-10-07 ENCOUNTER — TELEPHONE (OUTPATIENT)
Age: 74
End: 2024-10-07

## 2024-10-07 ENCOUNTER — APPOINTMENT (OUTPATIENT)
Dept: LAB | Facility: AMBULARY SURGERY CENTER | Age: 74
End: 2024-10-07
Payer: MEDICARE

## 2024-10-07 DIAGNOSIS — D50.0 BLOOD LOSS ANEMIA: ICD-10-CM

## 2024-10-07 LAB
HCT VFR BLD AUTO: 34.6 % (ref 34.8–46.1)
HGB BLD-MCNC: 11.2 G/DL (ref 11.5–15.4)

## 2024-10-07 PROCEDURE — 85018 HEMOGLOBIN: CPT

## 2024-10-07 PROCEDURE — 36415 COLL VENOUS BLD VENIPUNCTURE: CPT

## 2024-10-07 PROCEDURE — 85014 HEMATOCRIT: CPT

## 2024-10-07 NOTE — TELEPHONE ENCOUNTER
Patient called to confirm date/time/location of their next appointment.  10/28 with Dr Lindsay in [place] at 1040am

## 2024-10-07 NOTE — TELEPHONE ENCOUNTER
Dr. Nava's office-allergy and Immunology called to ask If patient could receive her Tdap and Pneumovax shots during her office visit tomorrow. Patient has low immunoglobulin response. Please call Nasreen back if this is not possible- they are entering the orders.

## 2024-10-08 ENCOUNTER — OFFICE VISIT (OUTPATIENT)
Dept: INTERNAL MEDICINE CLINIC | Facility: CLINIC | Age: 74
End: 2024-10-08
Payer: MEDICARE

## 2024-10-08 VITALS
HEIGHT: 63 IN | TEMPERATURE: 98.4 F | OXYGEN SATURATION: 95 % | RESPIRATION RATE: 14 BRPM | BODY MASS INDEX: 26.58 KG/M2 | SYSTOLIC BLOOD PRESSURE: 120 MMHG | DIASTOLIC BLOOD PRESSURE: 62 MMHG | WEIGHT: 150 LBS | HEART RATE: 88 BPM

## 2024-10-08 DIAGNOSIS — K92.2 LOWER GI BLEED: ICD-10-CM

## 2024-10-08 DIAGNOSIS — N18.5 ANEMIA IN STAGE 5 CHRONIC KIDNEY DISEASE, NOT ON CHRONIC DIALYSIS  (HCC): ICD-10-CM

## 2024-10-08 DIAGNOSIS — Z76.89 ENCOUNTER FOR SUPPORT AND COORDINATION OF TRANSITION OF CARE: ICD-10-CM

## 2024-10-08 DIAGNOSIS — K63.81 DIEULAFOY LESION OF COLON: ICD-10-CM

## 2024-10-08 DIAGNOSIS — D62 ACUTE BLOOD LOSS ANEMIA: Primary | ICD-10-CM

## 2024-10-08 DIAGNOSIS — Z23 ENCOUNTER FOR IMMUNIZATION: ICD-10-CM

## 2024-10-08 DIAGNOSIS — D63.1 ANEMIA IN STAGE 5 CHRONIC KIDNEY DISEASE, NOT ON CHRONIC DIALYSIS  (HCC): ICD-10-CM

## 2024-10-08 PROCEDURE — G0009 ADMIN PNEUMOCOCCAL VACCINE: HCPCS

## 2024-10-08 PROCEDURE — G0008 ADMIN INFLUENZA VIRUS VAC: HCPCS

## 2024-10-08 PROCEDURE — 99495 TRANSJ CARE MGMT MOD F2F 14D: CPT

## 2024-10-08 PROCEDURE — 90677 PCV20 VACCINE IM: CPT

## 2024-10-08 PROCEDURE — 90662 IIV NO PRSV INCREASED AG IM: CPT

## 2024-10-08 RX ORDER — SODIUM CHLORIDE 9 MG/ML
20 INJECTION, SOLUTION INTRAVENOUS ONCE
OUTPATIENT
Start: 2024-11-01

## 2024-10-08 NOTE — ASSESSMENT & PLAN NOTE
Recent admission with multiple colonoscopy's secondary to Dieulafoy's lesion.  Intermittent blood in the stool since discharge from the hospital. Bleeding is not persistent, it self resolved.    Patient advised hold her aspirin at this time to allow her colonic mucosa to heal. Will reach out patient's gastroenterologist Dr. Mosqueda for input on aspirin.   She is advised to take imodium prn for diarrhea.  If the bleeding is persistent or in large amount she should go to the nearest ED for evaluation.    Orders:    Ambulatory Referral to Colorectal Surgery; Future

## 2024-10-08 NOTE — ASSESSMENT & PLAN NOTE
Lab Results   Component Value Date    EGFR 14 10/01/2024    EGFR 15 09/30/2024    EGFR 14 09/29/2024    CREATININE 3.02 (H) 10/01/2024    CREATININE 2.85 (H) 09/30/2024    CREATININE 3.06 (H) 09/29/2024       Orders:    Basic metabolic panel; Future    CBC and Platelet; Standing

## 2024-10-08 NOTE — PROGRESS NOTES
Ambulatory Visit  Name: Trina Davis      : 1950      MRN: [id number]  Encounter Provider: Damien Rolle MD  Encounter Date: 10/8/2024   Encounter department: [facility] INTERNAL MEDICINE [place]    Assessment & Plan  Acute blood loss anemia  Recent admission with multiple colonoscopy's secondary to Dieulafoy's lesion.  Intermittent blood in the stool since discharge from the hospital. Bleeding is not persistent, it self resolved.    Patient advised hold her aspirin at this time to allow her colonic mucosa to heal. Will reach out patient's gastroenterologist Dr. Mosqueda for input on aspirin.   She is advised to take imodium prn for diarrhea.  If the bleeding is persistent or in large amount she should go to the nearest ED for evaluation.    Orders:    Ambulatory Referral to Colorectal Surgery; Future    Anemia in stage 5 chronic kidney disease, not on chronic dialysis  (HCC)  Lab Results   Component Value Date    EGFR 14 10/01/2024    EGFR 15 2024    EGFR 14 2024    CREATININE 3.02 (H) 10/01/2024    CREATININE 2.85 (H) 2024    CREATININE 3.06 (H) 2024       Orders:    Basic metabolic panel; Future    CBC and Platelet; Standing    Lower GI bleed    Orders:    Ambulatory Referral to Colorectal Surgery; Future    Basic metabolic panel; Future    CBC and Platelet; Standing    Dieulafoy lesion of colon    Orders:    Ambulatory Referral to Colorectal Surgery; Future    Encounter for immunization    Orders:    Pneumococcal Conjugate Vaccine 20-valent (Pcv20)    influenza vaccine, high-dose, PF 0.5 mL (Fluzone High Dose)       History of Present Illness     Ms. Davis is a 74 Y.O. F who presents to the clinic today for transitional care since her recent admission for hematochezia.  Patient had a colonoscopy  requiring clipping of a Dieulafoy's lesion but had continued bleeding requiring the repeat colonoscopy on  where she had reinforcement of the clipping to her Dieulafoy's lesion  "along with resection of 2 tubular adenomas.  Bleeding persisted during the hospital stay requiring other thyroid colonoscopy on 9/38 during which 2 more clips were placed to stop the bleeding at the site of polypectomy.  Patient was discharged with a stable hemoglobin.  Since discharge patient reported intermittent bloody bowel movements which self resolve.  Repeat H&H yesterday showed hemoglobin to be stable at 11.  Patient denies any fevers, chills no pain she does report decrease appetite and some fatigue since discharge from the hospital.            Review of Systems   Constitutional:  Positive for appetite change (eatting smaller meals, due to decreased appetite.) and fatigue (deconditioning). Negative for chills and fever.   Respiratory:  Negative for chest tightness and shortness of breath.    Cardiovascular:  Negative for chest pain.   Gastrointestinal:  Positive for blood in stool and diarrhea. Negative for constipation, nausea and vomiting.   Genitourinary:  Negative for dysuria.           Objective     /62 (BP Location: Right arm, Patient Position: Sitting, Cuff Size: Adult)   Pulse 88   Temp 98.4 °F (36.9 °C) (Tympanic)   Resp 14   Ht 5' 3\" (1.6 m)   Wt 68 kg (150 lb)   SpO2 95%   BMI 26.57 kg/m²     Physical Exam  Vitals reviewed.   Constitutional:       Appearance: Normal appearance. She is not ill-appearing.   HENT:      Mouth/Throat:      Mouth: Mucous membranes are moist.   Eyes:      Extraocular Movements: Extraocular movements intact.      Conjunctiva/sclera: Conjunctivae normal.   Cardiovascular:      Rate and Rhythm: Normal rate and regular rhythm.   Pulmonary:      Effort: Pulmonary effort is normal. No respiratory distress.      Breath sounds: Normal breath sounds. No stridor. No wheezing.   Abdominal:      General: Bowel sounds are normal. There is no distension.      Palpations: Abdomen is soft. There is no mass.      Tenderness: There is abdominal tenderness (lower quadrants on " deep palpation).      Hernia: No hernia is present.   Musculoskeletal:      Right lower leg: No edema.      Left lower leg: No edema.   Neurological:      Mental Status: She is alert and oriented to person, place, and time. Mental status is at baseline.

## 2024-10-08 NOTE — ASSESSMENT & PLAN NOTE
Orders:    Ambulatory Referral to Colorectal Surgery; Future    Basic metabolic panel; Future    CBC and Platelet; Standing

## 2024-10-09 ENCOUNTER — TELEPHONE (OUTPATIENT)
Dept: INTERNAL MEDICINE CLINIC | Facility: CLINIC | Age: 74
End: 2024-10-09

## 2024-10-11 ENCOUNTER — PATIENT OUTREACH (OUTPATIENT)
Dept: CASE MANAGEMENT | Facility: OTHER | Age: 74
End: 2024-10-11

## 2024-10-11 NOTE — PROGRESS NOTES
Outpatient Care Management Note:    Care manager called Trina. She states that she has not had any further noted bleeding. We reviewed that she should monitor for any dark,  tarry stools.  Trina knows that she is to complete blood work by 10/18. She states she will be calling [place] [facility] to schedule an appt.     Trina feels her appetite is improving, but not 100%.  She has been self adjusting her insulin due to low oral intake.  At lunch, her sugar was 250 and she took 5 units of her novolog.  I instructed her to call  endocrine and have them download her CGM so they can review readings and make adjustments.     Trina declined ongoing outreach stating she is doing better. She will call her PCP with any concerns.

## 2024-10-13 ENCOUNTER — PATIENT MESSAGE (OUTPATIENT)
Dept: INTERNAL MEDICINE CLINIC | Facility: CLINIC | Age: 74
End: 2024-10-13

## 2024-10-14 ENCOUNTER — TELEPHONE (OUTPATIENT)
Dept: INTERNAL MEDICINE CLINIC | Facility: CLINIC | Age: 74
End: 2024-10-14

## 2024-10-14 DIAGNOSIS — M79.7 FIBROMYALGIA: Primary | ICD-10-CM

## 2024-10-14 RX ORDER — ESCITALOPRAM OXALATE 10 MG/1
10 TABLET ORAL DAILY
Qty: 90 TABLET | Refills: 3 | Status: SHIPPED | OUTPATIENT
Start: 2024-10-14 | End: 2025-10-09

## 2024-10-14 NOTE — TELEPHONE ENCOUNTER
Patient needs refill esocotalopram   Hi doctor Cardio. Tried to refill the escotalopram and [facility] said the doctor made it inactive.  It is on her not active list.

## 2024-10-14 NOTE — TELEPHONE ENCOUNTER
Looks like it was stopped at last admission. I have re ordered it as we could not find a reason why it was stopped.

## 2024-10-15 PROBLEM — I12.9 BENIGN HYPERTENSIVE KIDNEY DISEASE WITH CHRONIC KIDNEY DISEASE: Status: ACTIVE | Noted: 2024-10-15

## 2024-10-15 PROBLEM — E11.21 DIABETIC NEPHROPATHY ASSOCIATED WITH TYPE 2 DIABETES MELLITUS (HCC): Status: ACTIVE | Noted: 2024-10-15

## 2024-10-15 NOTE — PROGRESS NOTES
RENAL FOLLOW UP NOTE:.td    ASSESSMENT AND PLAN:  1.  CKD stage 5.:  Etiology:  Diabetic nephropathy/hypertensive nephrosclerosis/arteriolar nephrosclerosis/chronic NSAID use.  No evidence of obstructive uropathy, renal artery disease or primary glomerular disease with a bland urinalysis  Of note, CPK was normal at 105 no evidence rhabdomyolysis.  Baseline creatinine: Most recently 2.7-3.5  Current creatinine 3.02 from 10/1/2024 during hospital admission essentially at higher in the baseline  Urine protein creatinine ratio: 1.40 improved  UA demonstrated no significant hematuria but 3+ proteinuria from  Serologies:  Normal C3/C4; negative rheumatoid factor; negative SPEP:  Negative UPEP;  Light chain ratio 2.09 essentially normal for chronic kidney disease  negative ELOY; negative hepatitis-C; normal IgA; normal ASO; negative RPR     Renal ultrasound no hydronephrosis small bilateral renal simple cyst left upper pole nonobstructing calculi okay        Recommendations:  Treat hypertension-please see below  Treat dyslipidemia-please see below  Maintain proteinuria less than 1 g or as low as possible: Adjust antihypertensive regimen in order to try to get to proteinuria less than 1 g please see below  Avoid nephrotoxic agents such as NSAIDs, patient counseled as such  S/p kidney smart  ACP meeting completed no kidney dialysis whatsoever at this juncture if it were to come to that!  Repeat labs at this time status post hospitalization as of 10/15/2024      2.  Volume:   Current status:  Euvolemic   Torsemide dose: Currently on torsemide 10 alternating with 20 mg every other day     3.  Hypertension:  Workup:  saline suppression test for primary aldosterone state was normal  plasma free metanephrines was negative  renal artery duplex was negative 02/2019       Current blood pressure averages:   None today:        Goal blood pressure:  less than 125/75 given less than 1 g of proteinuria at this time  Recommendations:  Push  nonmedical regimen including weight loss, isotonic exercise and avoidance of salt; patient counseled as such  Medication changes today:   Currently blood pressure in the office seems reasonable especially low diastolic readings and very mild drop in blood pressure upon standing.  Next step would be either to add back a very small dose of losartan if the potassium can tolerate it versus increasing hydralazine     4.  Electrolytes:   Mild metabolic acidosis from renal disease doing well at 26  Hyperkalemia now better at 4.5 low potassium diet     5.  Mineral bone disorder:  Calcium/magnesium/phosphorus:  All acceptable,   PTH intact: 150.7 from 8/9/2024  Vitamin-D: 30.1 from 4/1/2024 at goal continue to monitor     6.  Dyslipidemia:  Goal LDL:  Less than 100  Current lipid profile: LDL 25/HDL 34/triglycerides 331 from 4/8/24  Recommendations:  Per Endocrinology but essentially at goal        7.  Anemia:   Current hemoglobin 11.2 stablel after recent GI bleeding from 10/7/2024  Check iron studies patient followed closely by GI as well     8.  Other problems:  Depression  Diabetes mellitus per primary medical physician: I would avoid SGLT2 inhibitors at the moment with frequent urinary tract infections  Hypothyroidism  SHAYAN on CPAP  Fibromyalgia/osteoarthritis:  Patient with myoclonic movements, seems related to gabapentin it was adjusted with resolution.  Nephrolithiasis  Basal cell/squamous CA of the skin  Urinary stress incontinence  Status post incisional hernia repairs  hospitalization as outlined below from severe GERD with hypoxemia and shortness of breath from a failed Nissen fundoplication from prior hiatal hernia repair.  Patient is due to have further testing with an EGD by GI and then subsequently thoracic surgery consultation for possible repair(however, according to the patient at this juncture she was felt I risk so no surgery is planned at this time)  In addition, she was placed on Protonix 40 b.i.d. and  Pepcid 40 mg hs  Hospitalization on 07/14/2020 secondary confusion at home.  Apparently she had protracted hospital course in [place] following aspiration pneumonia.  Ten days in the ICU on a ventilator.  No overt infectious process.  Low probability for pulmonary embolus despite an elevated D-dimer.  Had mild leukocytosis/SIRS criteria subsequently discharged 1 day later when she clinically improved.  Symptoms were felt secondary to her protracted ICU course.   hospitalization with discharge on 05/18/21:  Epigastric pain following an EGD on 05/13/2021 for Botox injection at the pylorus for treatment for gastroparesis.  Apparently associated with chocolate ingestion and possible cough with aspiration  Complicated by NIKOS with creatinine to 2.6 which improved with IV fluids TO 1.92 ON 05/18/2021   Hospitalization 04/13/2022:  With hypotension fatigue noted to have a low blood pressure mild increasing creatinine, dysuria treated with Bactrim but had an allergic reaction to it as an outpatient she received intravenous ceftriaxone and she was discharged on doxycycline.  Recently placed on spironolactone for blood pressure all medications were held decide from metoprolol.  Amlodipine was resumed, torsemide re-initiated, but spironolactone was stopped.  Maintain off hydralazine possibly add olmesartan  Creatinine initially as high as 2.27 reduce down to 2.16 upon discharge  Patient admitted 1/7/2023 secondary headache and confusion left temporal symptoms and temporal artery tenderness with an elevated ESR started empirically on steroids per neurology recommendation status post temporal artery biopsy.  However she developed a vesicular rash felt to have herpes zoster ophthalmologists started on Valtrex seen by ophthalmology eyedrops also initiated.  Patient discharged on Valtrex and high-dose gabapentin with follow-up with her primary care physician.  Patient with leukocytosis most compatible with steroids  Admitted  8/19/2023 after recent UTI history of urinary retention and straight catheterizes herself: Admitted with NIKOS from poor oral intake UTI with Serratia given IV fluids placed on ertapenem/change in mental status noted.  Urinary straight catheterizations recommended to be increased by urology.  Torsemide 5 mg every other day.  Admitted 9/9 with change in mental status status post recent discharge earlier in September with UTI.  Seen by geriatrics recommended formal cognitive testing as an outpatient.  ([place] multifactorial from chronic medical conditions?  Underlying dementia): Patient's course complicated by acute kidney injury with a creatinine of 2.5 on admission diuretics were held gentle IV fluids creatinine back to baseline 2.07  Admitted 9/12/2023: COPD exacerbation treated with steroids nebulizers and oxygen.     Hospitalized 10/1/2024: With hematochezia underwent colonoscopy required clipping of Dieulafoy lesion at that time.  Subsequent colonoscopy 9/26 had 2 tubular adenomas removed as well as additional clipping to the lesion is noted.  Subsequent repeat Ace colonoscopy 9/3... Subsequently 2 more clips were placed reinforced the bleeding in the ascending colon and hemoglobin remained stable.     GI health maintenance: Please see above 9/30/2024    PATIENT INSTRUCTIONS:    Patient Instructions   Visit summary:  - Overall kidney function relatively stable we are going to recheck it at this time when you go for your other lab work for Dr. Rae  - Your blood pressure is acceptable given low bottom number diastolic        1. Medication changes today:  No medication changes today    2.  General instructions:  Continue to avoid salt  Remain as active as you are able to    3.  Please go for non fasting  lab work when you go for Dr. Rae    4.  Please take 1 week a blood pressure readings prior to your next appointment    AS FOLLOWS  MORNING AND EVENING, SITTING AND STANDING as follows:  TAKE THE MORNING  READINGS BEFORE ANY MEDICATIONS AND WHEN YOU ARE RELAXED FOR SEVERAL MINUTES  TAKE THE EVENING READINGS:  BETWEEN 7-10 P.M.; PRIOR TO ANY MEDICATIONS; AT LEAST IN OUR  FROM DINNER; AND CERTAINLY AFTER RELAXING FOR A FEW MINUTES  PLEASE INCLUDE HEART RATE WITH YOUR BLOOD PRESSURE READINGS  When taking standing readings, keep your arm supported at heart level and not dangling  Make sure you are sitting with your back supported and feet on the ground and do not cross your legs or feet  Make sure you have not taken any coffee or caffeine products or exercised or smoke cigarettes at least 30 minutes before taking your blood pressure  Then please mail these readings into the office            5.  Follow-up in 4 months  Please bring in 1 week a blood pressure readings morning evening, sitting and standing is outlined above  PLEASE BRING AN YOUR BLOOD PRESSURE MACHINE TO CORRELATE WITH THE OFFICE MACHINE AT THIS NEXT SCHEDULED VISIT  Please go for fasting lab work 1-2 weeks prior to your appointment      6.  General non medical recommendations:  AVOID SALT BUT NOT ADDING AN READING LABELS TO MAKE SURE THERE IS LOW-SALT IN THE FOOD THAT YOU ARE EATING  Goal is less than 2 g of sodium intake or less than 5 g of sodium chloride intake per day    Avoid nonsteroidal anti-inflammatory drugs such as Naprosyn, ibuprofen, Aleve, Advil, Celebrex, Meloxicam (Mobic) etc.  You can use Tylenol as needed if you do not have any liver condition to be concerned about    Avoid medications such as Sudafed or decongestants and antihistamines that contained the D component which is the decongestant.  You can take antihistamines without the decongestant or D component.    Try to avoid medications such as pantoprazole or  Protonix/Nexium or Esomeprazole)/Prilosec or omeprazole/Prevacid or lansoprazole/AcipHex or Rabeprazole.  If you are able to, use Pepcid as this is safer for your kidneys.    Try to exercise at least 30 minutes 3 days a  "week to begin with with an ultimate goal of 5 days a week for at least 30 minutes    Please do not drink more than 2 glasses of alcohol/wine on a daily basis as this may contribute to your high blood pressure.            Subjective:   Please see above regarding GI hospitalization  No more overt bleeding, no nausea vomiting, but appetite diminished somewhat, but trying to eat 3 meals a day.  Some loose stools over the last 4 months followed by Dr. Mosqueda will follow-up with PA next week  No fevers, chills, or cough or colds.  Energy poor  No hematuria, dysuria, voiding symptoms no change in foamy urine  No cardiovascular symptoms including swelling of the legs unless she sits for a long time  No headaches, dizziness or lightheadedness (did have some when her hemoglobin was low)  Blood pressure medications:  Torsemide 10 mg alternating with 20 mg every other day  Metoprolol tartrate twice a day  Hydralazine 25 mg twice a day  Amlodipine 10 mg daily at dinnertime    Renal pertinent medications:  Rosuvastatin 5 mg daily/Vascepa  Protonix 40 mg daily/ Carafate   Baby aspirin 81 mg daily but holding pending GI evaluation    ROS:  See HPI, otherwise review of systems as completely reviewed with the patient are negative    Past Medical History:   Diagnosis Date    Actinic keratosis     Acute cystitis without hematuria 04/28/2023    Acute cystitis without hematuria 04/28/2023    Acute respiratory failure with hypoxia (HCC)     Acute-on-chronic kidney injury  (HCC)     NIKOS (acute kidney injury) (HCC) 05/08/2020    Allergic     Allergic rhinitis     Ambulatory dysfunction 10/22/2020    AMS (altered mental status) 07/14/2020    Anesthesia     pt reports \"had to use double lumen endo tube for hiatal hernia repair/so surgeon could get to where he needed to work\"    Arthritis     Aspiration into airway     Michael esophagus 1993    Lot of stress with children    Basal cell carcinoma 2007    left cheek     BCC (basal cell carcinoma) " 05/27/2021    Left Nasal tip    Breast discharge 06/19/2023    Breast pain 06/19/2023    Cancer (HCC)     squamous cell cancer on forhead    Cancer (HCC)     basal cell on nose     Cholelithiasis     Chronic kidney disease 2000, 2018    Stones, kidney disease stage 4    Chronic pain disorder     bilat feet and joint pain on occas    Colon polyp     Confusion 10/30/2023    Constipation     COPD (chronic obstructive pulmonary disease) (HCC)     COVID-19 08/2021    recovered at home/did receive monoclonal infusion    COVID-19 virus infection 08/16/2021    Dental bridge present     Depression     Diabetes mellitus (HCC)     Type 2    Diabetic neuropathy (HCC)     Difficulty walking 2017    Disease of thyroid gland     Dyspnea     Elevated liver enzymes 04/04/2023    Epigastric abdominal pain with severe diabetic gastroparesis, status post EGD/Botox injection, 5/14 05/17/2021    Facial abrasion, initial encounter 03/22/2023    Fall 03/22/2023    Family history of thyroid problem     Fatty liver     Fibromyalgia, primary     Fracture of orbital floor, blow-out, right, closed, with routine healing, subsequent encounter 03/22/2023    GERD (gastroesophageal reflux disease)     Heart burn     Hiatal hernia     History of cholecystectomy 10/27/2023    History of colon polyps 07/30/2021    History of kidney stones 09/29/2020    Hx of recurrent kidney stones    History of kidney stones 09/29/2020    Hx of recurrent kidney stones  Formatting of this note might be different from the original. Hx of recurrent kidney stones  Last Assessment & Plan:  Formatting of this note might be different from the original. We will set her up for follow-up in 3 months with a KUB prior.  She knows to call if she has any kidney stone type pain in the meantime.    History of Nissen fundoplication 10/27/2023    History of pneumonia     History of repair of hiatal hernia 05/03/2020    Hyperlipidemia     Hyperplasia, parathyroid (HCC)     Hypertension      Hypertensive urgency 10/27/2023    Hyponatremia 04/26/2023    Hypotension 04/13/2022    Kidney problem     Kidney stone     Left hip pain 10/05/2023    Leukocytosis 07/14/2020    Memory loss Julu2 2020    Motion sickness     Motion sickness     Multiple closed fractures of ribs of right side 03/22/2023    Nasal bones, closed fracture 03/22/2023    Neck pain     Obesity 1978    Obesity (BMI 30.0-34.9)     Other chest pain 09/13/2021    Other fatigue 09/21/2023    Peripheral neuropathy     Pollen allergies     Preoperative clearance 06/27/2019    RLS (restless legs syndrome)     S/P foot surgery 07/20/2022    SCC (squamous cell carcinoma) 05/04/2021    left mid forehead    SCC (squamous cell carcinoma) 2023    chest    Seasonal allergies     Shingles 01 05 23    Sleep apnea     has inspire    Squamous cell skin cancer 2007    left cheek     Stroke (HCC)     Swollen ankles     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, left     great toe fusion    Urinary incontinence     Urinary tract infection 03/28/2022    Wears glasses      Past Surgical History:   Procedure Laterality Date    ABDOMINAL SURGERY  1997,2015,1972    Nissen x2 1972 tubal ligarion    ARTHROSCOPY KNEE Right     BREAST BIOPSY      stereotactic-benign    BREAST BIOPSY      stereo-benign    BREAST CYST EXCISION Bilateral     benign    BREAST EXCISIONAL BIOPSY      unknown date-benign    BREAST EXCISIONAL BIOPSY      unknown date-benign    BREAST EXCISIONAL BIOPSY      unknown date-benign    BREAST EXCISIONAL BIOPSY      unknown age-benign    CARDIAC CATHETERIZATION      CATARACT EXTRACTION Right     CHOLECYSTECTOMY      COLONOSCOPY      EXAMINATION UNDER ANESTHESIA N/A 06/24/2021    Procedure: EXAM UNDER ANESTHESIA (EUA), DISE;  Surgeon: Gregory Maier MD;  [facility] MA[facility] OR;  Service: ENT    HERNIA REPAIR      hiatal    HIATAL HERNIA REPAIR      KNEE SURGERY Right     Torn maniscus lap surg    LIPOSUCTION      LYMPH NODE BIOPSY      MOHS SURGERY   05/20/2021    left mid forehead-Mickey    MOHS SURGERY Left 05/27/2021    Left nasal tip- mickey     MO LIGATION/BIOPSY TEMPORAL ARTERY Left 01/05/2023    Procedure: BIOPSY ARTERY TEMPORAL;  Surgeon: Alec Dennis DO;  [facility];  Service: Vascular    MO OPEN IMPLANTATION CRANIAL NERVE BOOM & PULSE GEN N/A 11/10/2021    Procedure: INSERTION UPPER AIRWAY STIMULATOR, INSPIRE IMPLANT;  Surgeon: Gregory Maier MD;  Location[facility];  Service: ENT    MO UNLISTED PROCEDURE FOOT/TOES Right 07/19/2022    Procedure: 1st metatarsal phalangeal joint fusion;  Surgeon: Dede Bonner DPM;  [facility];  Service: Podiatry    REDUCTION MAMMAPLASTY Bilateral 2000    REDUCTION MAMMAPLASTY      SKIN BIOPSY      SKIN CANCER EXCISION  2007    squamous cell carcinoma     SKIN CANCER EXCISION  2007    basal cell carcinoma    SKIN CANCER EXCISION  2023    SCCIS chest    SQUAMOUS CELL CARCINOMA EXCISION      TOE SURGERY Right 08/04/2022    Right Great Toe Fusion    TONSILLECTOMY      TUBAL LIGATION      UPPER GASTROINTESTINAL ENDOSCOPY      US GUIDED BREAST BIOPSY RIGHT COMPLETE Right 07/18/2022     Family History   Problem Relation Age of Onset    Heart disease Mother     Depression Mother     Hypertension Mother     COPD Mother     Hearing loss Mother     Anxiety disorder Mother     Heart disease Father     Lung cancer Father 70        Smoker     Cancer Father         brain    Alcohol abuse Father     Dementia Father     Hypertension Father     Thyroid disease Father     COPD Father     Arthritis Father     Brain cancer Father 74    Hypertension Sister     Diabetes Sister     Heart disease Sister     Thyroid disease Sister     Cancer Sister         Lympoma, lung    Lung cancer Sister 79    Anxiety disorder Sister     Migraines Sister     Hypertension Brother     Diabetes Brother     Cancer Brother         Throat    Dementia Brother     Stroke Brother     Hypertension Brother     Heart disease Brother      Diabetes Brother     Hypertension Brother     Allergies Brother     Brain cancer Paternal Aunt         unknown age    No Known Problems Son     No Known Problems Son     Breast cancer Neg Hx       reports that she quit smoking about 48 years ago. Her smoking use included cigarettes. She started smoking about 58 years ago. She has a 20 pack-year smoking history. She has been exposed to tobacco smoke. She has never used smokeless tobacco. She reports current alcohol use. She reports that she does not use drugs.    I COMPLETELY REVIEWED THE PAST MEDICAL HISTORY/PAST SURGICAL HISTORY/SOCIAL HISTORY/FAMILY HISTORY/AND MEDICATIONS  AND UPDATED ALL    Objective:     Vitals:   BP sitting on right: 140/60 with a heart rate of 56 and regular  BP standing on right: 130/60 with a heart rate of 60 and regular    Weight (last 2 days)       Date/Time Weight    10/16/24 0956 69.4 (153)          Wt Readings from Last 3 Encounters:   10/16/24 69.4 kg (153 lb)   10/08/24 68 kg (150 lb)   09/29/24 67.9 kg (149 lb 11.1 oz)       Body mass index is 27.1 kg/m².    Physical Exam: General:  No acute distress  Skin:  No acute rash  Eyes:  No scleral icterus, noninjected, no discharge from eyes  ENT:  Moist mucous membranes  Neck:  Supple, no jugular venous distention, trachea is midline, no lymphadenopathy and no thyromegaly  Back   No CVAT  Chest:  Clear to auscultation and percussion, good respiratory effort  CVS:  Regular rate and rhythm without a rub, or gallops or murmurs  Abdomen:  Soft and nontender with normal bowel sounds  Extremities:  No cyanosis and no edema, significant arthritic changes, normal range of motion  Neuro:  Grossly intact  Psych:  Alert, oriented x3 and appropriate      Medications:    Current Outpatient Medications:     acetaminophen (TYLENOL) 500 mg tablet, Take 650 mg by mouth 3 (three) times a day, Disp: , Rfl:     albuterol (Ventolin HFA) 90 mcg/act inhaler, Inhale 2 puffs every 6 (six) hours as needed for  wheezing, Disp: 54 g, Rfl: 0    amLODIPine (NORVASC) 5 mg tablet, Take 2 tablets (10 mg total) by mouth daily, Disp: 180 tablet, Rfl: 1    aspirin (ECOTRIN LOW STRENGTH) 81 mg EC tablet, Take 1 tablet (81 mg total) by mouth daily Do not start before October 31, 2023., Disp: , Rfl: 0    B-D ULTRAFINE III SHORT PEN 31G X 8 MM MISC, , Disp: , Rfl: 3    Calcium Citrate 250 MG TABS, Take 2 tablets (500 mg total) by mouth in the morning, Disp: , Rfl:     Coenzyme Q10 (CoQ10) 100 MG CAPS, Take 100 mg by mouth in the morning Bed time, Disp: , Rfl:     Cyanocobalamin (Vitamin B12) 1000 MCG TBCR, Take 1,000 mcg by mouth once a week, Disp: , Rfl:     escitalopram (LEXAPRO) 10 mg tablet, Take 1 tablet (10 mg total) by mouth daily, Disp: 90 tablet, Rfl: 3    gabapentin (NEURONTIN) 100 mg capsule, Take 100 mg by mouth every 8 (eight) hours as needed, Disp: , Rfl:     hydrALAZINE (APRESOLINE) 25 mg tablet, Take 1 tablet (25 mg total) by mouth 2 (two) times a day, Disp: 180 tablet, Rfl: 0    Icosapent Ethyl (Vascepa) 1 g CAPS, Take 2 capsules (2 g total) by mouth 2 (two) times a day, Disp: 90 capsule, Rfl: 3    insulin aspart (NovoLOG) 100 units/mL injection, Inject under the skin 3 (three) times a day before meals 5units, 5units, 12units., Disp: , Rfl:     insulin detemir (Levemir FlexTouch) 100 Units/mL injection pen, Inject 20 Units under the skin every evening, Disp: , Rfl:     levothyroxine 100 mcg tablet, TAKE 1 TABLET BY MOUTH EVERY DAY, Disp: 90 tablet, Rfl: 1    methenamine hippurate (HIPREX) 1 g tablet, Take 1 tablet (1 g total) by mouth 2 (two) times a day with meals, Disp: 180 tablet, Rfl: 3    metoprolol tartrate (LOPRESSOR) 50 mg tablet, Take 1 tablet (50 mg total) by mouth every 12 (twelve) hours, Disp: 180 tablet, Rfl: 1    pantoprazole (PROTONIX) 40 mg tablet, Take 1 tablet (40 mg total) by mouth 2 (two) times a day, Disp: 60 tablet, Rfl: 0    pramipexole (MIRAPEX) 0.25 mg tablet, TAKE 1 TABLET (0.25 MG TOTAL) BY  "MOUTH DAILY AT BEDTIME, Disp: 90 tablet, Rfl: 1    Probiotic Product (PROBIOTIC BLEND PO), Take by mouth, Disp: , Rfl:     rosuvastatin (CRESTOR) 5 mg tablet, Take 1 tablet (5 mg total) by mouth daily, Disp: 90 tablet, Rfl: 3    sucralfate (CARAFATE) 1 g tablet, Take 1 tablet (1 g total) by mouth 3 (three) times a day with meals, Disp: 90 tablet, Rfl: 0    torsemide (DEMADEX) 10 mg tablet, TAKE 10 MG. DAILY EXCEPT MONDAY, WEDNESDAY, FRIDAY 20 MG. (Patient taking differently: Take 20 mg by mouth daily Alternating between 10 mg and 20 mg every other day), Disp: 150 tablet, Rfl: 1    Vitamin D, Cholecalciferol, 25 MCG (1000 UT) TABS, Take 2,000 Units by mouth in the morning, Disp: , Rfl:     Lab, Imaging and other studies: I have personally reviewed pertinent labs.  Laboratory Results:  Results for orders placed or performed in visit on 10/07/24   Hemoglobin and hematocrit, blood    Collection Time: 10/07/24  8:28 AM   Result Value Ref Range    Hemoglobin 11.2 (L) 11.5 - 15.4 g/dL    Hematocrit 34.6 (L) 34.8 - 46.1 %     *Note: Due to a large number of results and/or encounters for the requested time period, some results have not been displayed. A complete set of results can be found in Results Review.             Invalid input(s): \"ALBUMIN\"      Radiology review:   chest X-ray    Ultrasound      Portions of the record may have been created with voice recognition software.  Occasional wrong word or \"sound a like\" substitutions may have occurred due to the inherent limitations of voice recognition software.  Read the chart carefully and recognize, using context, where substitutions have occurred.                    "

## 2024-10-16 ENCOUNTER — OFFICE VISIT (OUTPATIENT)
Dept: NEPHROLOGY | Facility: CLINIC | Age: 74
End: 2024-10-16
Payer: MEDICARE

## 2024-10-16 VITALS — HEIGHT: 63 IN | WEIGHT: 153 LBS | BODY MASS INDEX: 27.11 KG/M2

## 2024-10-16 DIAGNOSIS — N18.5 ANEMIA IN STAGE 5 CHRONIC KIDNEY DISEASE, NOT ON CHRONIC DIALYSIS  (HCC): Chronic | ICD-10-CM

## 2024-10-16 DIAGNOSIS — N25.81 SECONDARY HYPERPARATHYROIDISM OF RENAL ORIGIN (HCC): Chronic | ICD-10-CM

## 2024-10-16 DIAGNOSIS — E55.9 VITAMIN D DEFICIENCY: Chronic | ICD-10-CM

## 2024-10-16 DIAGNOSIS — R60.0 EDEMA OF EXTREMITY: ICD-10-CM

## 2024-10-16 DIAGNOSIS — E78.2 MIXED HYPERLIPIDEMIA: ICD-10-CM

## 2024-10-16 DIAGNOSIS — N18.5 STAGE 5 CHRONIC KIDNEY DISEASE NOT ON CHRONIC DIALYSIS (HCC): ICD-10-CM

## 2024-10-16 DIAGNOSIS — K92.2 LOWER GI BLEED: ICD-10-CM

## 2024-10-16 DIAGNOSIS — I12.0 BENIGN HYPERTENSIVE KIDNEY DISEASE WITH CHRONIC KIDNEY DISEASE STAGE V OR END STAGE RENAL DISEASE (HCC): Primary | ICD-10-CM

## 2024-10-16 DIAGNOSIS — D63.1 ANEMIA IN STAGE 5 CHRONIC KIDNEY DISEASE, NOT ON CHRONIC DIALYSIS  (HCC): Chronic | ICD-10-CM

## 2024-10-16 DIAGNOSIS — E11.21 DIABETIC NEPHROPATHY ASSOCIATED WITH TYPE 2 DIABETES MELLITUS (HCC): ICD-10-CM

## 2024-10-16 PROCEDURE — 99214 OFFICE O/P EST MOD 30 MIN: CPT | Performed by: INTERNAL MEDICINE

## 2024-10-16 PROCEDURE — G2211 COMPLEX E/M VISIT ADD ON: HCPCS | Performed by: INTERNAL MEDICINE

## 2024-10-16 NOTE — PATIENT INSTRUCTIONS
Visit summary:  - Overall kidney function relatively stable we are going to recheck it at this time when you go for your other lab work for Dr. Rae  - Your blood pressure is acceptable given low bottom number diastolic        1. Medication changes today:  No medication changes today    2.  General instructions:  Continue to avoid salt  Remain as active as you are able to    3.  Please go for non fasting  lab work when you go for Dr. Rae    4.  Please take 1 week a blood pressure readings prior to your next appointment    AS FOLLOWS  MORNING AND EVENING, SITTING AND STANDING as follows:  TAKE THE MORNING READINGS BEFORE ANY MEDICATIONS AND WHEN YOU ARE RELAXED FOR SEVERAL MINUTES  TAKE THE EVENING READINGS:  BETWEEN 7-10 P.M.; PRIOR TO ANY MEDICATIONS; AT LEAST IN OUR  FROM DINNER; AND CERTAINLY AFTER RELAXING FOR A FEW MINUTES  PLEASE INCLUDE HEART RATE WITH YOUR BLOOD PRESSURE READINGS  When taking standing readings, keep your arm supported at heart level and not dangling  Make sure you are sitting with your back supported and feet on the ground and do not cross your legs or feet  Make sure you have not taken any coffee or caffeine products or exercised or smoke cigarettes at least 30 minutes before taking your blood pressure  Then please mail these readings into the office            5.  Follow-up in 4 months  Please bring in 1 week a blood pressure readings morning evening, sitting and standing is outlined above  PLEASE BRING AN YOUR BLOOD PRESSURE MACHINE TO CORRELATE WITH THE OFFICE MACHINE AT THIS NEXT SCHEDULED VISIT  Please go for fasting lab work 1-2 weeks prior to your appointment      6.  General non medical recommendations:  AVOID SALT BUT NOT ADDING AN READING LABELS TO MAKE SURE THERE IS LOW-SALT IN THE FOOD THAT YOU ARE EATING  Goal is less than 2 g of sodium intake or less than 5 g of sodium chloride intake per day    Avoid nonsteroidal anti-inflammatory drugs such as Naprosyn,  ibuprofen, Aleve, Advil, Celebrex, Meloxicam (Mobic) etc.  You can use Tylenol as needed if you do not have any liver condition to be concerned about    Avoid medications such as Sudafed or decongestants and antihistamines that contained the D component which is the decongestant.  You can take antihistamines without the decongestant or D component.    Try to avoid medications such as pantoprazole or  Protonix/Nexium or Esomeprazole)/Prilosec or omeprazole/Prevacid or lansoprazole/AcipHex or Rabeprazole.  If you are able to, use Pepcid as this is safer for your kidneys.    Try to exercise at least 30 minutes 3 days a week to begin with with an ultimate goal of 5 days a week for at least 30 minutes    Please do not drink more than 2 glasses of alcohol/wine on a daily basis as this may contribute to your high blood pressure.

## 2024-10-16 NOTE — LETTER
October 16, 2024     Tree Aguilar MD  [address] [place] [id number]    Patient: Trina Davis   YOB: 1950   Date of Visit: 10/16/2024       Dear Dr. Aguilar:    Thank you for referring Trina Davis to me for evaluation. Below are my notes for this consultation.    If you have questions, please do not hesitate to call me. I look forward to following your patient along with you.         Sincerely,        Donaldo Baires MD        CC: MD Donaldo Lake MD  10/16/2024 10:19 AM  Sign when Signing Visit  RENAL FOLLOW UP NOTE:.td    ASSESSMENT AND PLAN:  1.  CKD stage 5.:  Etiology:  Diabetic nephropathy/hypertensive nephrosclerosis/arteriolar nephrosclerosis/chronic NSAID use.  No evidence of obstructive uropathy, renal artery disease or primary glomerular disease with a bland urinalysis  Of note, CPK was normal at 105 no evidence rhabdomyolysis.  Baseline creatinine: Most recently 2.7-3.5  Current creatinine 3.02 from 10/1/2024 during hospital admission essentially at higher in the baseline  Urine protein creatinine ratio: 1.40 improved  UA demonstrated no significant hematuria but 3+ proteinuria from  Serologies:  Normal C3/C4; negative rheumatoid factor; negative SPEP:  Negative UPEP;  Light chain ratio 2.09 essentially normal for chronic kidney disease  negative ELOY; negative hepatitis-C; normal IgA; normal ASO; negative RPR     Renal ultrasound no hydronephrosis small bilateral renal simple cyst left upper pole nonobstructing calculi okay        Recommendations:  Treat hypertension-please see below  Treat dyslipidemia-please see below  Maintain proteinuria less than 1 g or as low as possible: Adjust antihypertensive regimen in order to try to get to proteinuria less than 1 g please see below  Avoid nephrotoxic agents such as NSAIDs, patient counseled as such  S/p kidney smart  ACP meeting completed no kidney dialysis whatsoever at this juncture if it were to come to  that!  Repeat labs at this time status post hospitalization as of 10/15/2024      2.  Volume:   Current status:  Euvolemic   Torsemide dose: Currently on torsemide 10 alternating with 20 mg every other day     3.  Hypertension:  Workup:  saline suppression test for primary aldosterone state was normal  plasma free metanephrines was negative  renal artery duplex was negative 02/2019       Current blood pressure averages:   None today:        Goal blood pressure:  less than 125/75 given less than 1 g of proteinuria at this time  Recommendations:  Push nonmedical regimen including weight loss, isotonic exercise and avoidance of salt; patient counseled as such  Medication changes today:   Currently blood pressure in the office seems reasonable especially low diastolic readings and very mild drop in blood pressure upon standing.  Next step would be either to add back a very small dose of losartan if the potassium can tolerate it versus increasing hydralazine     4.  Electrolytes:   Mild metabolic acidosis from renal disease doing well at 26  Hyperkalemia now better at 4.5 low potassium diet     5.  Mineral bone disorder:  Calcium/magnesium/phosphorus:  All acceptable,   PTH intact: 150.7 from 8/9/2024  Vitamin-D: 30.1 from 4/1/2024 at goal continue to monitor     6.  Dyslipidemia:  Goal LDL:  Less than 100  Current lipid profile: LDL 25/HDL 34/triglycerides 331 from 4/8/24  Recommendations:  Per Endocrinology but essentially at goal        7.  Anemia:   Current hemoglobin 11.2 stablel after recent GI bleeding from 10/7/2024  Check iron studies patient followed closely by GI as well     8.  Other problems:  Depression  Diabetes mellitus per primary medical physician: I would avoid SGLT2 inhibitors at the moment with frequent urinary tract infections  Hypothyroidism  SHAYAN on CPAP  Fibromyalgia/osteoarthritis:  Patient with myoclonic movements, seems related to gabapentin it was adjusted with  resolution.  Nephrolithiasis  Basal cell/squamous CA of the skin  Urinary stress incontinence  Status post incisional hernia repairs  hospitalization as outlined below from severe GERD with hypoxemia and shortness of breath from a failed Nissen fundoplication from prior hiatal hernia repair.  Patient is due to have further testing with an EGD by GI and then subsequently thoracic surgery consultation for possible repair(however, according to the patient at this juncture she was felt I risk so no surgery is planned at this time)  In addition, she was placed on Protonix 40 b.i.d. and Pepcid 40 mg hs  Hospitalization on 07/14/2020 secondary confusion at home.  Apparently she had protracted hospital course in [place] following aspiration pneumonia.  Ten days in the ICU on a ventilator.  No overt infectious process.  Low probability for pulmonary embolus despite an elevated D-dimer.  Had mild leukocytosis/SIRS criteria subsequently discharged 1 day later when she clinically improved.  Symptoms were felt secondary to her protracted ICU course.   hospitalization with discharge on 05/18/21:  Epigastric pain following an EGD on 05/13/2021 for Botox injection at the pylorus for treatment for gastroparesis.  Apparently associated with chocolate ingestion and possible cough with aspiration  Complicated by NIKOS with creatinine to 2.6 which improved with IV fluids TO 1.92 ON 05/18/2021   Hospitalization 04/13/2022:  With hypotension fatigue noted to have a low blood pressure mild increasing creatinine, dysuria treated with Bactrim but had an allergic reaction to it as an outpatient she received intravenous ceftriaxone and she was discharged on doxycycline.  Recently placed on spironolactone for blood pressure all medications were held decide from metoprolol.  Amlodipine was resumed, torsemide re-initiated, but spironolactone was stopped.  Maintain off hydralazine possibly add olmesartan  Creatinine initially as high as 2.27  reduce down to 2.16 upon discharge  Patient admitted 1/7/2023 secondary headache and confusion left temporal symptoms and temporal artery tenderness with an elevated ESR started empirically on steroids per neurology recommendation status post temporal artery biopsy.  However she developed a vesicular rash felt to have herpes zoster ophthalmologists started on Valtrex seen by ophthalmology eyedrops also initiated.  Patient discharged on Valtrex and high-dose gabapentin with follow-up with her primary care physician.  Patient with leukocytosis most compatible with steroids  Admitted 8/19/2023 after recent UTI history of urinary retention and straight catheterizes herself: Admitted with NIKOS from poor oral intake UTI with Serratia given IV fluids placed on ertapenem/change in mental status noted.  Urinary straight catheterizations recommended to be increased by urology.  Torsemide 5 mg every other day.  Admitted 9/9 with change in mental status status post recent discharge earlier in September with UTI.  Seen by geriatrics recommended formal cognitive testing as an outpatient.  ([place] multifactorial from chronic medical conditions?  Underlying dementia): Patient's course complicated by acute kidney injury with a creatinine of 2.5 on admission diuretics were held gentle IV fluids creatinine back to baseline 2.07  Admitted 9/12/2023: COPD exacerbation treated with steroids nebulizers and oxygen.     Hospitalized 10/1/2024: With hematochezia underwent colonoscopy required clipping of Dieulafoy lesion at that time.  Subsequent colonoscopy 9/26 had 2 tubular adenomas removed as well as additional clipping to the lesion is noted.  Subsequent repeat Ace colonoscopy 9/3... Subsequently 2 more clips were placed reinforced the bleeding in the ascending colon and hemoglobin remained stable.     GI health maintenance: Please see above 9/30/2024    PATIENT INSTRUCTIONS:    Patient Instructions   Visit summary:  - Overall kidney  function relatively stable we are going to recheck it at this time when you go for your other lab work for Dr. Rae  - Your blood pressure is acceptable given low bottom number diastolic        1. Medication changes today:  No medication changes today    2.  General instructions:  Continue to avoid salt  Remain as active as you are able to    3.  Please go for non fasting  lab work when you go for Dr. Rae    4.  Please take 1 week a blood pressure readings prior to your next appointment    AS FOLLOWS  MORNING AND EVENING, SITTING AND STANDING as follows:  TAKE THE MORNING READINGS BEFORE ANY MEDICATIONS AND WHEN YOU ARE RELAXED FOR SEVERAL MINUTES  TAKE THE EVENING READINGS:  BETWEEN 7-10 P.M.; PRIOR TO ANY MEDICATIONS; AT LEAST IN OUR  FROM DINNER; AND CERTAINLY AFTER RELAXING FOR A FEW MINUTES  PLEASE INCLUDE HEART RATE WITH YOUR BLOOD PRESSURE READINGS  When taking standing readings, keep your arm supported at heart level and not dangling  Make sure you are sitting with your back supported and feet on the ground and do not cross your legs or feet  Make sure you have not taken any coffee or caffeine products or exercised or smoke cigarettes at least 30 minutes before taking your blood pressure  Then please mail these readings into the office            5.  Follow-up in 4 months  Please bring in 1 week a blood pressure readings morning evening, sitting and standing is outlined above  PLEASE BRING AN YOUR BLOOD PRESSURE MACHINE TO CORRELATE WITH THE OFFICE MACHINE AT THIS NEXT SCHEDULED VISIT  Please go for fasting lab work 1-2 weeks prior to your appointment      6.  General non medical recommendations:  AVOID SALT BUT NOT ADDING AN READING LABELS TO MAKE SURE THERE IS LOW-SALT IN THE FOOD THAT YOU ARE EATING  Goal is less than 2 g of sodium intake or less than 5 g of sodium chloride intake per day    Avoid nonsteroidal anti-inflammatory drugs such as Naprosyn, ibuprofen, Aleve, Advil,  Celebrex, Meloxicam (Mobic) etc.  You can use Tylenol as needed if you do not have any liver condition to be concerned about    Avoid medications such as Sudafed or decongestants and antihistamines that contained the D component which is the decongestant.  You can take antihistamines without the decongestant or D component.    Try to avoid medications such as pantoprazole or  Protonix/Nexium or Esomeprazole)/Prilosec or omeprazole/Prevacid or lansoprazole/AcipHex or Rabeprazole.  If you are able to, use Pepcid as this is safer for your kidneys.    Try to exercise at least 30 minutes 3 days a week to begin with with an ultimate goal of 5 days a week for at least 30 minutes    Please do not drink more than 2 glasses of alcohol/wine on a daily basis as this may contribute to your high blood pressure.            Subjective:   Please see above regarding GI hospitalization  No more overt bleeding, no nausea vomiting, but appetite diminished somewhat, but trying to eat 3 meals a day.  Some loose stools over the last 4 months followed by Dr. Mosqueda will follow-up with PA next week  No fevers, chills, or cough or colds.  Energy poor  No hematuria, dysuria, voiding symptoms no change in foamy urine  No cardiovascular symptoms including swelling of the legs unless she sits for a long time  No headaches, dizziness or lightheadedness (did have some when her hemoglobin was low)  Blood pressure medications:  Torsemide 10 mg alternating with 20 mg every other day  Metoprolol tartrate twice a day  Hydralazine 25 mg twice a day  Amlodipine 10 mg daily at dinnertime    Renal pertinent medications:  Rosuvastatin 5 mg daily/Vascepa  Protonix 40 mg daily/ Carafate   Baby aspirin 81 mg daily but holding pending GI evaluation    ROS:  See HPI, otherwise review of systems as completely reviewed with the patient are negative    Past Medical History:   Diagnosis Date   • Actinic keratosis    • Acute cystitis without hematuria 04/28/2023   •  "Acute cystitis without hematuria 04/28/2023   • Acute respiratory failure with hypoxia (HCC)    • Acute-on-chronic kidney injury  (HCC)    • NIKOS (acute kidney injury) (HCC) 05/08/2020   • Allergic    • Allergic rhinitis    • Ambulatory dysfunction 10/22/2020   • AMS (altered mental status) 07/14/2020   • Anesthesia     pt reports \"had to use double lumen endo tube for hiatal hernia repair/so surgeon could get to where he needed to work\"   • Arthritis    • Aspiration into airway    • Michael esophagus 1993    Lot of stress with children   • Basal cell carcinoma 2007    left cheek    • BCC (basal cell carcinoma) 05/27/2021    Left Nasal tip   • Breast discharge 06/19/2023   • Breast pain 06/19/2023   • Cancer (HCC)     squamous cell cancer on forhead   • Cancer (HCC)     basal cell on nose    • Cholelithiasis    • Chronic kidney disease 2000, 2018    Stones, kidney disease stage 4   • Chronic pain disorder     bilat feet and joint pain on occas   • Colon polyp    • Confusion 10/30/2023   • Constipation    • COPD (chronic obstructive pulmonary disease) (HCC)    • COVID-19 08/2021    recovered at home/did receive monoclonal infusion   • COVID-19 virus infection 08/16/2021   • Dental bridge present    • Depression    • Diabetes mellitus (HCC)     Type 2   • Diabetic neuropathy (HCC)    • Difficulty walking 2017   • Disease of thyroid gland    • Dyspnea    • Elevated liver enzymes 04/04/2023   • Epigastric abdominal pain with severe diabetic gastroparesis, status post EGD/Botox injection, 5/14 05/17/2021   • Facial abrasion, initial encounter 03/22/2023   • Fall 03/22/2023   • Family history of thyroid problem    • Fatty liver    • Fibromyalgia, primary    • Fracture of orbital floor, blow-out, right, closed, with routine healing, subsequent encounter 03/22/2023   • GERD (gastroesophageal reflux disease)    • Heart burn    • Hiatal hernia    • History of cholecystectomy 10/27/2023   • History of colon polyps 07/30/2021 "   • History of kidney stones 09/29/2020    Hx of recurrent kidney stones   • History of kidney stones 09/29/2020    Hx of recurrent kidney stones  Formatting of this note might be different from the original. Hx of recurrent kidney stones  Last Assessment & Plan:  Formatting of this note might be different from the original. We will set her up for follow-up in 3 months with a KUB prior.  She knows to call if she has any kidney stone type pain in the meantime.   • History of Nissen fundoplication 10/27/2023   • History of pneumonia    • History of repair of hiatal hernia 05/03/2020   • Hyperlipidemia    • Hyperplasia, parathyroid (HCC)    • Hypertension    • Hypertensive urgency 10/27/2023   • Hyponatremia 04/26/2023   • Hypotension 04/13/2022   • Kidney problem    • Kidney stone    • Left hip pain 10/05/2023   • Leukocytosis 07/14/2020   • Memory loss Julu2 2020   • Motion sickness    • Motion sickness    • Multiple closed fractures of ribs of right side 03/22/2023   • Nasal bones, closed fracture 03/22/2023   • Neck pain    • Obesity 1978   • Obesity (BMI 30.0-34.9)    • Other chest pain 09/13/2021   • Other fatigue 09/21/2023   • Peripheral neuropathy    • Pollen allergies    • Preoperative clearance 06/27/2019   • RLS (restless legs syndrome)    • S/P foot surgery 07/20/2022   • SCC (squamous cell carcinoma) 05/04/2021    left mid forehead   • SCC (squamous cell carcinoma) 2023    chest   • Seasonal allergies    • Shingles 01 05 23   • Sleep apnea     has inspire   • Squamous cell skin cancer 2007    left cheek    • Stroke (HCC)    • Swollen ankles    • 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, left     great toe fusion   • Urinary incontinence    • Urinary tract infection 03/28/2022   • Wears glasses      Past Surgical History:   Procedure Laterality Date   • ABDOMINAL SURGERY  1997,2015,1972    Nissen x2 1972 tubal ligarion   • ARTHROSCOPY KNEE Right    • BREAST BIOPSY      stereotactic-benign   • BREAST BIOPSY       stereo-benign   • BREAST CYST EXCISION Bilateral     benign   • BREAST EXCISIONAL BIOPSY      unknown date-benign   • BREAST EXCISIONAL BIOPSY      unknown date-benign   • BREAST EXCISIONAL BIOPSY      unknown date-benign   • BREAST EXCISIONAL BIOPSY      unknown age-benign   • CARDIAC CATHETERIZATION     • CATARACT EXTRACTION Right    • CHOLECYSTECTOMY     • COLONOSCOPY     • EXAMINATION UNDER ANESTHESIA N/A 06/24/2021    Procedure: EXAM UNDER ANESTHESIA (EUA), DISE;  Surgeon: Gregory Maier MD;  Location: [facility];  Service: ENT   • HERNIA REPAIR      hiatal   • HIATAL HERNIA REPAIR     • KNEE SURGERY Right     Torn maniscus lap surg   • LIPOSUCTION     • LYMPH NODE BIOPSY     • MOHS SURGERY  05/20/2021    left mid forehead-Mickey   • MOHS SURGERY Left 05/27/2021    Left nasal tip- mickey    • CO LIGATION/BIOPSY TEMPORAL ARTERY Left 01/05/2023    Procedure: BIOPSY ARTERY TEMPORAL;  Surgeon: Alec Dennis DO;  [facility];  Service: Vascular   • CO OPEN IMPLANTATION CRANIAL NERVE BOOM & PULSE GEN N/A 11/10/2021    Procedure: INSERTION UPPER AIRWAY STIMULATOR, INSPIRE IMPLANT;  Surgeon: Gregory Maier MD;  Location[facility];  Service: ENT   • CO UNLISTED PROCEDURE FOOT/TOES Right 07/19/2022    Procedure: 1st metatarsal phalangeal joint fusion;  Surgeon: Dede Bonner DPM;  Location: [facility];  Service: Podiatry   • REDUCTION MAMMAPLASTY Bilateral 2000   • REDUCTION MAMMAPLASTY     • SKIN BIOPSY     • SKIN CANCER EXCISION  2007    squamous cell carcinoma    • SKIN CANCER EXCISION  2007    basal cell carcinoma   • SKIN CANCER EXCISION  2023    SCCIS chest   • SQUAMOUS CELL CARCINOMA EXCISION     • TOE SURGERY Right 08/04/2022    Right Great Toe Fusion   • TONSILLECTOMY     • TUBAL LIGATION     • UPPER GASTROINTESTINAL ENDOSCOPY     • US GUIDED BREAST BIOPSY RIGHT COMPLETE Right 07/18/2022     Family History   Problem Relation Age of Onset   • Heart disease Mother    • Depression Mother    •  Hypertension Mother    • COPD Mother    • Hearing loss Mother    • Anxiety disorder Mother    • Heart disease Father    • Lung cancer Father 70        Smoker    • Cancer Father         brain   • Alcohol abuse Father    • Dementia Father    • Hypertension Father    • Thyroid disease Father    • COPD Father    • Arthritis Father    • Brain cancer Father 74   • Hypertension Sister    • Diabetes Sister    • Heart disease Sister    • Thyroid disease Sister    • Cancer Sister         Lympoma, lung   • Lung cancer Sister 79   • Anxiety disorder Sister    • Migraines Sister    • Hypertension Brother    • Diabetes Brother    • Cancer Brother         Throat   • Dementia Brother    • Stroke Brother    • Hypertension Brother    • Heart disease Brother    • Diabetes Brother    • Hypertension Brother    • Allergies Brother    • Brain cancer Paternal Aunt         unknown age   • No Known Problems Son    • No Known Problems Son    • Breast cancer Neg Hx       reports that she quit smoking about 48 years ago. Her smoking use included cigarettes. She started smoking about 58 years ago. She has a 20 pack-year smoking history. She has been exposed to tobacco smoke. She has never used smokeless tobacco. She reports current alcohol use. She reports that she does not use drugs.    I COMPLETELY REVIEWED THE PAST MEDICAL HISTORY/PAST SURGICAL HISTORY/SOCIAL HISTORY/FAMILY HISTORY/AND MEDICATIONS  AND UPDATED ALL    Objective:     Vitals:   BP sitting on right: 140/60 with a heart rate of 56 and regular  BP standing on right: 130/60 with a heart rate of 60 and regular    Weight (last 2 days)       Date/Time Weight    10/16/24 0956 69.4 (153)          Wt Readings from Last 3 Encounters:   10/16/24 69.4 kg (153 lb)   10/08/24 68 kg (150 lb)   09/29/24 67.9 kg (149 lb 11.1 oz)       Body mass index is 27.1 kg/m².    Physical Exam: General:  No acute distress  Skin:  No acute rash  Eyes:  No scleral icterus, noninjected, no discharge from  eyes  ENT:  Moist mucous membranes  Neck:  Supple, no jugular venous distention, trachea is midline, no lymphadenopathy and no thyromegaly  Back   No CVAT  Chest:  Clear to auscultation and percussion, good respiratory effort  CVS:  Regular rate and rhythm without a rub, or gallops or murmurs  Abdomen:  Soft and nontender with normal bowel sounds  Extremities:  No cyanosis and no edema, no arthritic changes, normal range of motion  Neuro:  Grossly intact  Psych:  Alert, oriented x3 and appropriate      Medications:    Current Outpatient Medications:   •  acetaminophen (TYLENOL) 500 mg tablet, Take 650 mg by mouth 3 (three) times a day, Disp: , Rfl:   •  albuterol (Ventolin HFA) 90 mcg/act inhaler, Inhale 2 puffs every 6 (six) hours as needed for wheezing, Disp: 54 g, Rfl: 0  •  amLODIPine (NORVASC) 5 mg tablet, Take 2 tablets (10 mg total) by mouth daily, Disp: 180 tablet, Rfl: 1  •  aspirin (ECOTRIN LOW STRENGTH) 81 mg EC tablet, Take 1 tablet (81 mg total) by mouth daily Do not start before October 31, 2023., Disp: , Rfl: 0  •  B-D ULTRAFINE III SHORT PEN 31G X 8 MM MISC, , Disp: , Rfl: 3  •  Calcium Citrate 250 MG TABS, Take 2 tablets (500 mg total) by mouth in the morning, Disp: , Rfl:   •  Coenzyme Q10 (CoQ10) 100 MG CAPS, Take 100 mg by mouth in the morning Bed time, Disp: , Rfl:   •  Cyanocobalamin (Vitamin B12) 1000 MCG TBCR, Take 1,000 mcg by mouth once a week, Disp: , Rfl:   •  escitalopram (LEXAPRO) 10 mg tablet, Take 1 tablet (10 mg total) by mouth daily, Disp: 90 tablet, Rfl: 3  •  gabapentin (NEURONTIN) 100 mg capsule, Take 100 mg by mouth every 8 (eight) hours as needed, Disp: , Rfl:   •  hydrALAZINE (APRESOLINE) 25 mg tablet, Take 1 tablet (25 mg total) by mouth 2 (two) times a day, Disp: 180 tablet, Rfl: 0  •  Icosapent Ethyl (Vascepa) 1 g CAPS, Take 2 capsules (2 g total) by mouth 2 (two) times a day, Disp: 90 capsule, Rfl: 3  •  insulin aspart (NovoLOG) 100 units/mL injection, Inject under the  skin 3 (three) times a day before meals 5units, 5units, 12units., Disp: , Rfl:   •  insulin detemir (Levemir FlexTouch) 100 Units/mL injection pen, Inject 20 Units under the skin every evening, Disp: , Rfl:   •  levothyroxine 100 mcg tablet, TAKE 1 TABLET BY MOUTH EVERY DAY, Disp: 90 tablet, Rfl: 1  •  methenamine hippurate (HIPREX) 1 g tablet, Take 1 tablet (1 g total) by mouth 2 (two) times a day with meals, Disp: 180 tablet, Rfl: 3  •  metoprolol tartrate (LOPRESSOR) 50 mg tablet, Take 1 tablet (50 mg total) by mouth every 12 (twelve) hours, Disp: 180 tablet, Rfl: 1  •  pantoprazole (PROTONIX) 40 mg tablet, Take 1 tablet (40 mg total) by mouth 2 (two) times a day, Disp: 60 tablet, Rfl: 0  •  pramipexole (MIRAPEX) 0.25 mg tablet, TAKE 1 TABLET (0.25 MG TOTAL) BY MOUTH DAILY AT BEDTIME, Disp: 90 tablet, Rfl: 1  •  Probiotic Product (PROBIOTIC BLEND PO), Take by mouth, Disp: , Rfl:   •  rosuvastatin (CRESTOR) 5 mg tablet, Take 1 tablet (5 mg total) by mouth daily, Disp: 90 tablet, Rfl: 3  •  sucralfate (CARAFATE) 1 g tablet, Take 1 tablet (1 g total) by mouth 3 (three) times a day with meals, Disp: 90 tablet, Rfl: 0  •  torsemide (DEMADEX) 10 mg tablet, TAKE 10 MG. DAILY EXCEPT MONDAY, WEDNESDAY, FRIDAY 20 MG. (Patient taking differently: Take 20 mg by mouth daily Alternating between 10 mg and 20 mg every other day), Disp: 150 tablet, Rfl: 1  •  Vitamin D, Cholecalciferol, 25 MCG (1000 UT) TABS, Take 2,000 Units by mouth in the morning, Disp: , Rfl:     Lab, Imaging and other studies: I have personally reviewed pertinent labs.  Laboratory Results:  Results for orders placed or performed in visit on 10/07/24   Hemoglobin and hematocrit, blood    Collection Time: 10/07/24  8:28 AM   Result Value Ref Range    Hemoglobin 11.2 (L) 11.5 - 15.4 g/dL    Hematocrit 34.6 (L) 34.8 - 46.1 %     *Note: Due to a large number of results and/or encounters for the requested time period, some results have not been displayed. A  "complete set of results can be found in Results Review.             Invalid input(s): \"ALBUMIN\"      Radiology review:   chest X-ray    Ultrasound      Portions of the record may have been created with voice recognition software.  Occasional wrong word or \"sound a like\" substitutions may have occurred due to the inherent limitations of voice recognition software.  Read the chart carefully and recognize, using context, where substitutions have occurred.                    "

## 2024-10-20 DIAGNOSIS — E03.9 ACQUIRED HYPOTHYROIDISM: Chronic | ICD-10-CM

## 2024-10-21 ENCOUNTER — APPOINTMENT (OUTPATIENT)
Dept: LAB | Facility: AMBULARY SURGERY CENTER | Age: 74
End: 2024-10-21
Payer: MEDICARE

## 2024-10-21 DIAGNOSIS — N18.5 ANEMIA IN STAGE 5 CHRONIC KIDNEY DISEASE, NOT ON CHRONIC DIALYSIS  (HCC): ICD-10-CM

## 2024-10-21 DIAGNOSIS — E55.9 VITAMIN D DEFICIENCY: Chronic | ICD-10-CM

## 2024-10-21 DIAGNOSIS — E78.2 MIXED HYPERLIPIDEMIA: ICD-10-CM

## 2024-10-21 DIAGNOSIS — R60.0 EDEMA OF EXTREMITY: ICD-10-CM

## 2024-10-21 DIAGNOSIS — N25.81 SECONDARY HYPERPARATHYROIDISM OF RENAL ORIGIN (HCC): Chronic | ICD-10-CM

## 2024-10-21 DIAGNOSIS — E11.21 DIABETIC NEPHROPATHY ASSOCIATED WITH TYPE 2 DIABETES MELLITUS (HCC): ICD-10-CM

## 2024-10-21 DIAGNOSIS — N18.5 STAGE 5 CHRONIC KIDNEY DISEASE NOT ON CHRONIC DIALYSIS (HCC): ICD-10-CM

## 2024-10-21 DIAGNOSIS — K92.2 LOWER GI BLEED: ICD-10-CM

## 2024-10-21 DIAGNOSIS — D63.1 ANEMIA IN STAGE 5 CHRONIC KIDNEY DISEASE, NOT ON CHRONIC DIALYSIS  (HCC): ICD-10-CM

## 2024-10-21 DIAGNOSIS — I12.0 BENIGN HYPERTENSIVE KIDNEY DISEASE WITH CHRONIC KIDNEY DISEASE STAGE V OR END STAGE RENAL DISEASE (HCC): ICD-10-CM

## 2024-10-21 LAB
ANION GAP SERPL CALCULATED.3IONS-SCNC: 12 MMOL/L (ref 4–13)
BUN SERPL-MCNC: 46 MG/DL (ref 5–25)
CALCIUM SERPL-MCNC: 9.2 MG/DL (ref 8.4–10.2)
CHLORIDE SERPL-SCNC: 101 MMOL/L (ref 96–108)
CO2 SERPL-SCNC: 26 MMOL/L (ref 21–32)
CREAT SERPL-MCNC: 2.9 MG/DL (ref 0.6–1.3)
ERYTHROCYTE [DISTWIDTH] IN BLOOD BY AUTOMATED COUNT: 16.5 % (ref 11.6–15.1)
FERRITIN SERPL-MCNC: 45 NG/ML (ref 11–307)
GFR SERPL CREATININE-BSD FRML MDRD: 15 ML/MIN/1.73SQ M
GLUCOSE P FAST SERPL-MCNC: 191 MG/DL (ref 65–99)
HCT VFR BLD AUTO: 39.7 % (ref 34.8–46.1)
HGB BLD-MCNC: 12.9 G/DL (ref 11.5–15.4)
IRON SATN MFR SERPL: 17 % (ref 15–50)
IRON SERPL-MCNC: 60 UG/DL (ref 50–212)
MAGNESIUM SERPL-MCNC: 2 MG/DL (ref 1.9–2.7)
MCH RBC QN AUTO: 28.9 PG (ref 26.8–34.3)
MCHC RBC AUTO-ENTMCNC: 32.5 G/DL (ref 31.4–37.4)
MCV RBC AUTO: 89 FL (ref 82–98)
PLATELET # BLD AUTO: 293 THOUSANDS/UL (ref 149–390)
PMV BLD AUTO: 11.5 FL (ref 8.9–12.7)
POTASSIUM SERPL-SCNC: 3.8 MMOL/L (ref 3.5–5.3)
RBC # BLD AUTO: 4.46 MILLION/UL (ref 3.81–5.12)
SODIUM SERPL-SCNC: 139 MMOL/L (ref 135–147)
TIBC SERPL-MCNC: 362 UG/DL (ref 250–450)
UIBC SERPL-MCNC: 302 UG/DL (ref 155–355)
WBC # BLD AUTO: 8.45 THOUSAND/UL (ref 4.31–10.16)

## 2024-10-21 PROCEDURE — 85027 COMPLETE CBC AUTOMATED: CPT

## 2024-10-21 PROCEDURE — 83550 IRON BINDING TEST: CPT

## 2024-10-21 PROCEDURE — 80048 BASIC METABOLIC PNL TOTAL CA: CPT

## 2024-10-21 PROCEDURE — 83540 ASSAY OF IRON: CPT

## 2024-10-21 PROCEDURE — 82728 ASSAY OF FERRITIN: CPT

## 2024-10-21 PROCEDURE — 36415 COLL VENOUS BLD VENIPUNCTURE: CPT

## 2024-10-21 PROCEDURE — 83735 ASSAY OF MAGNESIUM: CPT

## 2024-10-22 ENCOUNTER — OFFICE VISIT (OUTPATIENT)
Dept: GASTROENTEROLOGY | Facility: AMBULARY SURGERY CENTER | Age: 74
End: 2024-10-22
Payer: MEDICARE

## 2024-10-22 ENCOUNTER — TELEPHONE (OUTPATIENT)
Dept: NEPHROLOGY | Facility: CLINIC | Age: 74
End: 2024-10-22

## 2024-10-22 VITALS
HEART RATE: 59 BPM | DIASTOLIC BLOOD PRESSURE: 78 MMHG | SYSTOLIC BLOOD PRESSURE: 128 MMHG | HEIGHT: 63 IN | OXYGEN SATURATION: 97 % | BODY MASS INDEX: 26.54 KG/M2 | WEIGHT: 149.8 LBS

## 2024-10-22 DIAGNOSIS — E11.43 GASTROPARESIS DUE TO DM  (HCC): ICD-10-CM

## 2024-10-22 DIAGNOSIS — K31.84 GASTROPARESIS DUE TO DM  (HCC): ICD-10-CM

## 2024-10-22 DIAGNOSIS — R10.9 ABDOMINAL CRAMPING: ICD-10-CM

## 2024-10-22 DIAGNOSIS — K52.9 CHRONIC DIARRHEA: ICD-10-CM

## 2024-10-22 DIAGNOSIS — K92.2 LOWER GI BLEEDING: Primary | ICD-10-CM

## 2024-10-22 PROCEDURE — 99214 OFFICE O/P EST MOD 30 MIN: CPT | Performed by: PHYSICIAN ASSISTANT

## 2024-10-22 RX ORDER — LEVOTHYROXINE SODIUM 100 UG/1
100 TABLET ORAL DAILY
Qty: 90 TABLET | Refills: 1 | Status: SHIPPED | OUTPATIENT
Start: 2024-10-22

## 2024-10-22 NOTE — PATIENT INSTRUCTIONS
Start fiber gummies or powder daily  Consider stopping colace pending bowels on fiber  Can resume aspirin in 2 weeks

## 2024-10-22 NOTE — TELEPHONE ENCOUNTER
----- Message from Donaldo Baires MD sent at 10/21/2024  3:14 PM EDT -----  Actually labs look quite good and stable  Kidney function is stable  Iron 3 days a week as listed prior  ----- Message -----  From: Lab, Background User  Sent: 10/21/2024  11:48 AM EDT  To: Donaldo Baires MD

## 2024-10-22 NOTE — PROGRESS NOTES
Ambulatory Visit  Name: Trina Davis      : 1950      MRN: [id number]  Encounter Provider: Elisabeth Blood PA-C  Encounter Date: 10/22/2024   Encounter department: [facility] [place]    Assessment & Plan  Lower GI bleeding  -recent colonoscopy with polypectomy and ascending colon dieulafoy lesion status posttreatment, subsequently requiring 2 further colonoscopies for treatment for recurrent bleeding of the dieulafoy lesion, no current bleeding, hgb has returned to normal   -monitor for recurrent bleeding  -can resume ASA in 1-2 weeks        Chronic diarrhea  Reports issues with this off and on for last 6 months, had constipation prior to that. Some days she does not go at all. Other days she passes a formed BM followed by diarrhea. Has associated cramping with diarrhea which resolves after a BM, has had negative celiac testing, colonoscopy. Suspect IBS  -start fiber supplement daily  -monitor diet, trial of avoiding dairy  -consider levsin, patient would like to hold off for now   -consider IBGard, Xifaxan in the future if no improvement  -consider stopping colace with fiber use to prevent loose stools        Abdominal cramping  -see plan above        Gastroparesis due to DM  (HCC)  -well controlled, no N/V at this time   -small frequent, low residue meals   Lab Results   Component Value Date    HGBA1C 6.4 (H) 2024          I have spent a total time of 35 minutes in caring for this patient on the day of the visit/encounter including Diagnostic results, Prognosis, Risks and benefits of tx options, Instructions for management, Patient and family education, Importance of tx compliance, Risk factor reductions, Documenting in the medical record, Reviewing / ordering tests, medicine, procedures  , and Obtaining or reviewing history  .      History of Present Illness     Trina Davis is a 74 y.o. female with history of hypertension, COPD, diabetes, gastroparesis, stroke,  osteoporosis, CKD, fibromyalgia, hyperlipidemia who presents for a follow-up from recent hospitalization. She is accompanied by her . Patient underwent recent colonoscopy with polyp removal and dieulafoy lesion that was treated after being found incidentally.  She then was hospitalized twice for recurrent bleeding and had clip placement at area of lesion.  Since leaving the hospital she had 1 transient small episode of rectal bleeding which resolved spontaneously.  She denies any blood in the stool currently denies any black tarry stool.  She has not resumed her baby aspirin.  She reports that for the last 6 months she has been having intermittent diarrhea.  Some days she will not move her bowels at all, other days she will have a formed bowel movement followed by diarrhea.  There is typically abdominal cramping associated with the diarrhea that resolves after a bowel movement.  Denies any nausea or vomiting.  Reports a good appetite.  Has not taken anything for the diarrhea.  She is also scheduled to have iron infusions through her PCP for the next 2 months after having the rectal bleeding episode.      Review of Systems   Constitutional:  Negative for activity change, appetite change, fever and unexpected weight change.   HENT:  Negative for drooling, mouth sores, sore throat, trouble swallowing and voice change.    Eyes:  Negative for pain, discharge and visual disturbance.   Respiratory:  Negative for cough, choking, chest tightness and shortness of breath.    Cardiovascular:  Negative for chest pain, palpitations and leg swelling.   Gastrointestinal:  Positive for abdominal pain and diarrhea. Negative for abdominal distention, anal bleeding, blood in stool, constipation, nausea, rectal pain and vomiting.   Genitourinary:  Negative for decreased urine volume, difficulty urinating, dysuria, flank pain, frequency, hematuria and urgency.   Musculoskeletal:  Positive for arthralgias. Negative for back pain,  "gait problem, myalgias and neck stiffness.   Skin:  Negative for color change, pallor and rash.   Neurological:  Negative for dizziness, syncope, light-headedness and headaches.   Hematological:  Negative for adenopathy.   Psychiatric/Behavioral:  Negative for confusion. The patient is not nervous/anxious.            Objective     /78 (BP Location: Left arm, Patient Position: Sitting, Cuff Size: Standard)   Pulse 59   Ht 5' 3\" (1.6 m)   Wt 67.9 kg (149 lb 12.8 oz)   SpO2 97%   BMI 26.54 kg/m²     Physical Exam  Constitutional:       General: She is not in acute distress.     Appearance: Normal appearance. She is well-developed.   HENT:      Head: Normocephalic and atraumatic.      Mouth/Throat:      Mouth: Mucous membranes are moist.   Eyes:      General: No scleral icterus.  Neck:      Trachea: No tracheal deviation.   Cardiovascular:      Rate and Rhythm: Normal rate and regular rhythm.      Heart sounds: Normal heart sounds. No murmur heard.  Pulmonary:      Effort: Pulmonary effort is normal. No respiratory distress.      Breath sounds: Normal breath sounds. No wheezing or rales.   Abdominal:      General: Bowel sounds are normal. There is no distension.      Palpations: Abdomen is soft. There is no mass.      Tenderness: There is no abdominal tenderness. There is no guarding or rebound.   Musculoskeletal:         General: Normal range of motion.      Cervical back: Normal range of motion and neck supple.   Skin:     General: Skin is warm and dry.      Coloration: Skin is not pale.      Findings: No rash.   Neurological:      Mental Status: She is alert and oriented to person, place, and time. Mental status is at baseline.   Psychiatric:         Mood and Affect: Mood normal.         Behavior: Behavior normal.         "

## 2024-10-26 DIAGNOSIS — D62 ACUTE BLOOD LOSS ANEMIA: ICD-10-CM

## 2024-10-28 ENCOUNTER — TELEPHONE (OUTPATIENT)
Age: 74
End: 2024-10-28

## 2024-10-28 ENCOUNTER — OFFICE VISIT (OUTPATIENT)
Dept: DERMATOLOGY | Facility: CLINIC | Age: 74
End: 2024-10-28
Payer: MEDICARE

## 2024-10-28 VITALS — WEIGHT: 149 LBS | BODY MASS INDEX: 26.4 KG/M2 | HEIGHT: 63 IN

## 2024-10-28 DIAGNOSIS — D48.5 NEOPLASM OF UNCERTAIN BEHAVIOR OF SKIN: Primary | ICD-10-CM

## 2024-10-28 PROCEDURE — 17003 DESTRUCT PREMALG LES 2-14: CPT | Performed by: REGISTERED NURSE

## 2024-10-28 PROCEDURE — 88342 IMHCHEM/IMCYTCHM 1ST ANTB: CPT | Performed by: STUDENT IN AN ORGANIZED HEALTH CARE EDUCATION/TRAINING PROGRAM

## 2024-10-28 PROCEDURE — 11102 TANGNTL BX SKIN SINGLE LES: CPT | Performed by: REGISTERED NURSE

## 2024-10-28 PROCEDURE — 17000 DESTRUCT PREMALG LESION: CPT | Performed by: REGISTERED NURSE

## 2024-10-28 PROCEDURE — 99214 OFFICE O/P EST MOD 30 MIN: CPT | Performed by: REGISTERED NURSE

## 2024-10-28 PROCEDURE — 11103 TANGNTL BX SKIN EA SEP/ADDL: CPT | Performed by: REGISTERED NURSE

## 2024-10-28 PROCEDURE — 88305 TISSUE EXAM BY PATHOLOGIST: CPT | Performed by: STUDENT IN AN ORGANIZED HEALTH CARE EDUCATION/TRAINING PROGRAM

## 2024-10-28 PROCEDURE — 88341 IMHCHEM/IMCYTCHM EA ADD ANTB: CPT | Performed by: STUDENT IN AN ORGANIZED HEALTH CARE EDUCATION/TRAINING PROGRAM

## 2024-10-28 RX ORDER — SUCRALFATE 1 G/1
1 TABLET ORAL
Qty: 90 TABLET | Refills: 5 | Status: SHIPPED | OUTPATIENT
Start: 2024-10-28

## 2024-10-28 NOTE — PROGRESS NOTES
"[facility] Dermatology Clinic Note     Patient Name: Trina Davis  Encounter Date: 10/28/24     Have you been cared for by a [facility] Dermatologist in the last 3 years and, if so, which description applies to you?    Yes.  I have been here within the last 3 years, and my medical history has NOT changed since that time.  I am FEMALE/of child-bearing potential.    REVIEW OF SYSTEMS:  Have you recently had or currently have any of the following? No changes in my recent health.   PAST MEDICAL HISTORY:  Have you personally ever had or currently have any of the following?  If \"YES,\" then please provide more detail. No changes in my medical history.   HISTORY OF IMMUNOSUPPRESSION: Do you have a history of any of the following:  Systemic Immunosuppression such as Diabetes, Biologic or Immunotherapy, Chemotherapy, Organ Transplantation, Bone Marrow Transplantation or Prednisone?  No     Answering \"YES\" requires the addition of the dotphrase \"IMMUNOSUPPRESSED\" as the first diagnosis of the patient's visit.   FAMILY HISTORY:  Any \"first degree relatives\" (parent, brother, sister, or child) with the following?    No changes in my family's known health.   PATIENT EXPERIENCE:    Do you want the Dermatologist to perform a COMPLETE skin exam today including a clinical examination under the \"bra and underwear\" areas?  Yes  If necessary, do we have your permission to call and leave a detailed message on your Preferred Phone number that includes your specific medical information?  Yes      Allergies   Allergen Reactions    Ciprofloxacin Hives    Pollen Extract Allergic Rhinitis    Sulfamethoxazole-Trimethoprim Tongue Swelling      Current Outpatient Medications:     acetaminophen (TYLENOL) 500 mg tablet, Take 650 mg by mouth as needed, Disp: , Rfl:     albuterol (Ventolin HFA) 90 mcg/act inhaler, Inhale 2 puffs every 6 (six) hours as needed for wheezing (Patient not taking: Reported on 10/22/2024), Disp: 54 g, Rfl: 0    amLODIPine " (NORVASC) 5 mg tablet, Take 2 tablets (10 mg total) by mouth daily, Disp: 180 tablet, Rfl: 1    aspirin (ECOTRIN LOW STRENGTH) 81 mg EC tablet, Take 1 tablet (81 mg total) by mouth daily Do not start before October 31, 2023. (Patient not taking: Reported on 10/22/2024), Disp: , Rfl: 0    B-D ULTRAFINE III SHORT PEN 31G X 8 MM MISC, , Disp: , Rfl: 3    Calcium Citrate 250 MG TABS, Take 2 tablets (500 mg total) by mouth in the morning, Disp: , Rfl:     Coenzyme Q10 (CoQ10) 100 MG CAPS, Take 100 mg by mouth in the morning Bed time, Disp: , Rfl:     Cyanocobalamin (Vitamin B12) 1000 MCG TBCR, Take 1,000 mcg by mouth once a week, Disp: , Rfl:     escitalopram (LEXAPRO) 10 mg tablet, Take 1 tablet (10 mg total) by mouth daily, Disp: 90 tablet, Rfl: 3    gabapentin (NEURONTIN) 100 mg capsule, Take 100 mg by mouth every 8 (eight) hours as needed, Disp: , Rfl:     hydrALAZINE (APRESOLINE) 25 mg tablet, Take 1 tablet (25 mg total) by mouth 2 (two) times a day, Disp: 180 tablet, Rfl: 0    Icosapent Ethyl (Vascepa) 1 g CAPS, Take 2 capsules (2 g total) by mouth 2 (two) times a day, Disp: 90 capsule, Rfl: 3    insulin aspart (NovoLOG) 100 units/mL injection, Inject under the skin 3 (three) times a day before meals 5units, 5units, 12units., Disp: , Rfl:     insulin detemir (Levemir FlexTouch) 100 Units/mL injection pen, Inject 20 Units under the skin every evening, Disp: , Rfl:     levothyroxine 100 mcg tablet, TAKE 1 TABLET BY MOUTH EVERY DAY, Disp: 90 tablet, Rfl: 1    methenamine hippurate (HIPREX) 1 g tablet, Take 1 tablet (1 g total) by mouth 2 (two) times a day with meals, Disp: 180 tablet, Rfl: 3    metoprolol tartrate (LOPRESSOR) 50 mg tablet, Take 1 tablet (50 mg total) by mouth every 12 (twelve) hours, Disp: 180 tablet, Rfl: 1    pantoprazole (PROTONIX) 40 mg tablet, Take 1 tablet (40 mg total) by mouth 2 (two) times a day, Disp: 60 tablet, Rfl: 0    pramipexole (MIRAPEX) 0.25 mg tablet, TAKE 1 TABLET (0.25 MG TOTAL) BY  MOUTH DAILY AT BEDTIME, Disp: 90 tablet, Rfl: 1    Probiotic Product (PROBIOTIC BLEND PO), Take by mouth, Disp: , Rfl:     rosuvastatin (CRESTOR) 5 mg tablet, Take 1 tablet (5 mg total) by mouth daily, Disp: 90 tablet, Rfl: 3    sucralfate (CARAFATE) 1 g tablet, Take 1 tablet (1 g total) by mouth 3 (three) times a day with meals, Disp: 90 tablet, Rfl: 0    torsemide (DEMADEX) 10 mg tablet, TAKE 10 MG. DAILY EXCEPT MONDAY, WEDNESDAY, FRIDAY 20 MG. (Patient taking differently: Take 20 mg by mouth daily Alternating between 10 mg and 20 mg every other day), Disp: 150 tablet, Rfl: 1    Vitamin D, Cholecalciferol, 25 MCG (1000 UT) TABS, Take 2,000 Units by mouth in the morning, Disp: , Rfl:           Whom besides the patient is providing clinical information about today's encounter?   NO ADDITIONAL HISTORIAN (patient alone provided history)    Physical Exam and Assessment/Plan by Diagnosis:      Patient here today for skin check,patient reports having some dry patches on left ear and some spots on face       1. HISTORY OF BASAL CELL CARCINOMA     Physical Exam:  Anatomic Location Affected:  Left nasal tip, left cheek  Morphological Description of scar:  Well healed scar on cheek; 0.65 cm graft within normal limits, left tip/supra-tip  Suspected Recurrence: No        Additional History of Present Condition:  History of basal cell carcinoma with no sign of recurrence.  Status post Mohs surgery 5/27/2021 on left nasal tip.  Patient states the skin graft she had done swelled, she presented to the emergency department for it and was told it was ok.  Dr. Mora did a Kenalog injection in the area and use the Pulsed Eye Laser on the area.     Assessment and Plan:  The full-thickness graft site and the right-retroauricular donor site look great.  Recommended follow-up with Dr. Mora (upon her return).  Based on a thorough discussion of this condition and the management approach to it (including a comprehensive discussion of the  known risks, side effects and potential benefits of treatment), the patient (family) agrees to implement the following specific plan:  Monitor for changes.  Continue regular skin checks with Dermatology.  Apply SPF 50 or higher multiple times a day.  Wear sun protecting clothing and hats.  Discussed treatment options such as laser treatments.  It should improve gradually on it's own     How can basal cell carcinoma be prevented?  The most important way to prevent BCC is to avoid sunburn. This is especially important in childhood and early life. Fair skinned individuals and those with a personal or family history of BCC should protect their skin from sun exposure daily, year-round and lifelong.  Stay indoors or under the shade in the middle of the day   Wear covering clothing   Apply high protection factor SPF50+ broad-spectrum sunscreens generously to exposed skin if outdoors   Avoid indoor tanning (sun beds, solaria)  Oral nicotinamide (vitamin B3) in a dose of 500 mg twice daily may reduce the number and severity of BCCs.     What is the outlook for basal cell carcinoma?  Most BCCs are cured by treatment. Cure is most likely if treatment is undertaken when the lesion is small.  About 50% of people with BCC develop a second one within 3 years of the first. They are also at increased risk of other skin cancers, especially melanoma. Regular self-skin examinations and long-term annual skin checks by an experienced health professional are recommended.           2. HISTORY OF SQUAMOUS CELL CARCINOMA      Physical Exam:  Anatomic Location Affected:  Left mid forehead, left cheek  Morphological Description of Scar:  Well healed scar on forehead and cheek  Suspected Recurrence: no  Regional adenopathy: no     Additional History of Present Condition:  Status post Mohs surgery, 5/20/2021, on forehead.     Assessment and Plan:  Based on a thorough discussion of this condition and the management approach to it (including a  "comprehensive discussion of the known risks, side effects and potential benefits of treatment), the patient (family) agrees to implement the following specific plan:  Monitor for changes  Continue regular skin checks with Dermatology  Apply SPF 50 or higher multiple times a day.  Wear sun protecting clothing and hats.     How can SCC be prevented?  The most important way to prevent SCC is to avoid sunburn. This is especially important in childhood and early life. Fair skinned individuals and those with a personal or family history of BCC should protect their skin from sun exposure daily, year-round and lifelong.  Stay indoors or under the shade in the middle of the day   Wear covering clothing   Apply high protection factor SPF50+ broad-spectrum sunscreens generously to exposed skin if outdoors   Avoid indoor tanning (sun beds, solaria)        What is the outlook for SCC?  Most SCC are cured by treatment. Cure is most likely if treatment is undertaken when the lesion is small. A small percent of SCC's can spread to lymph  nodes and long term monitoring is indicated.   They are also at increased risk of other skin cancers, especially melanoma. Regular self-skin examinations and long-term annual skin checks by an experienced health professional are recommended.       NEOPLASM OF UNCERTAIN BEHAVIOR OF SKIN    Physical Exam:  (Anatomic Location); (Size and Morphological Description); (Differential Diagnosis):  Specimen A;skin;shave biopsy;left check;0.4 cm pink papule;Ddx:R/O BCC  Specimen B;skin;shave biopsy;upper mid back; 0.3 cm pink papule;Ddx:R/o BCC  Specimen C;skin;shave biopsy;right mid back;0.6 cm pink papule;Ddx:R/o BCC  Pertinent Positives:  Pertinent Negatives:    Additional History of Present Condition:      Assessment and Plan:  I have discussed with the patient that a sample of skin via a \"skin biopsy” would be potentially helpful to further make a specific diagnosis under the microscope.  Based on a " thorough discussion of this condition and the management approach to it (including a comprehensive discussion of the known risks, side effects and potential benefits of treatment), the patient (family) agrees to implement the following specific plan:    Procedure:  Skin Biopsy.  After a thorough discussion of treatment options and risk/benefits/alternatives (including but not limited to local pain, scarring, dyspigmentation, blistering, possible superinfection, and inability to confirm a diagnosis via histopathology), verbal and written consent were obtained and portion of the rash was biopsied for tissue sample.  See below for consent that was obtained from patient and subsequent Procedure Note.     PROCEDURE TANGENTIAL (SHAVE) BIOPSY NOTE:    Performing Physician:  Neftali  Anatomic Location; Clinical Description with size (cm); Pre-Op Diagnosis:   Specimen A;left check;0.4 cm pink papule;Ddx:R/O BCC  Specimen B;upper mid back; 0.3 cm pink papule;Ddx:R/o BCC  Specimen C;right mid back;0.6 cm pink papule;Ddx:R/o BCC  Post-op diagnosis: Same     Local anesthesia: 2% Xylocaine with epi     Topical anesthesia: None    Hemostasis: Aluminum chloride       After obtaining informed consent  at which time there was a discussion about the purpose of biopsy  and low risks of infection and bleeding.  The area was prepped and draped in the usual fashion. Anesthesia was obtained with 1% lidocaine with epinephrine. A shave biopsy to an appropriate sampling depth was obtained by Shave (Dermablade or 15 blade) The resulting wound was covered with surgical ointment and bandaged appropriately.     The patient tolerated the procedure well without complications and was without signs of functional compromise.      Specimen has been sent for review by Dermatopathology.    Standard post-procedure care has been explained and has been included in written form within the patient's copy of Informed Consent.    INFORMED CONSENT DISCUSSION  "AND POST-OPERATIVE INSTRUCTIONS FOR PATIENT    I.  RATIONALE FOR PROCEDURE  I understand that a skin biopsy allows the Dermatologist to test a lesion or rash under the microscope to obtain a diagnosis.  It usually involves numbing the area with numbing medication and removing a small piece of skin; sometimes the area will be closed with sutures. In this specific procedure, sutures are not usually needed.  If any sutures are placed, then they are usually need to be removed in 2 weeks or less.    I understand that my Dermatologist recommends that a skin \"shave\" biopsy be performed today.  A local anesthetic, similar to the kind that a dentist uses when filling a cavity, will be injected with a very small needle into the skin area to be sampled.  The injected skin and tissue underneath \"will go to sleep” and become numb so no pain should be felt afterwards.  An instrument shaped like a tiny \"razor blade\" (shave biopsy instrument) will be used to cut a small piece of tissue and skin from the area so that a sample of tissue can be taken and examined more closely under the microscope.  A slight amount of bleeding will occur, but it will be stopped with direct pressure and a pressure bandage and any other appropriate methods.  I understands that a scar will form where the wound was created.  Surgical ointment will be applied to help protect the wound.  Sutures are not usually needed.    II.  RISKS AND POTENTIAL COMPLICATIONS   I understand the risks and potential complications of a skin biopsy include but are not limited to the following:  Bleeding  Infection  Pain  Scar/keloid  Skin discoloration  Incomplete Removal  Recurrence  Nerve Damage/Numbness/Loss of Function  Allergic Reaction to Anesthesia  Biopsies are diagnostic procedures and based on findings additional treatment or evaluation may be required  Loss or destruction of specimen resulting in no additional findings    My Dermatologist has explained to me the " "nature of the condition, the nature of the procedure, and the benefits to be reasonably expected compared with alternative approaches.  My Dermatologist has discussed the likelihood of major risks or complications of this procedure including the specific risks listed above, such as bleeding, infection, and scarring/keloid.  I understand that a scar is expected after this procedure.  I understand that my physician cannot predict if the scar will form a \"keloid,\" which extends beyond the borders of the wound that is created.  A keloid is a thick, painful, and bumpy scar.  A keloid can be difficult to treat, as it does not always respond well to therapy, which includes injecting cortisone directly into the keloid every few weeks.  While this usually reduces the pain and size of the scar, it does not eliminate it.      I understand that photographs may be taken before and after the procedure.  These will be maintained as part of the medical providers confidential records and may not be made available to me.  I further authorize the medical provider to use the photographs for teaching purposes or to illustrate scientific papers, books, or lectures if in his/her judgment, medical research, education, or science may benefit from its use.    I have had an opportunity to fully inquire about the risks and benefits of this procedure and its alternatives.   I have been given ample time and opportunity to ask questions and to seek a second opinion if I wished to do so.  I acknowledge that there have specifically been no guarantees as to the cosmetic results from the procedure.  I am aware that with any procedure there is always the possibility of an unexpected complication.    III. POST-PROCEDURAL CARE (WHAT YOU WILL NEED TO DO \"AFTER THE BIOPSY\" TO OPTIMIZE HEALING)    Keep the area clean and dry.  Try NOT to remove the bandage or get it wet for the first 24 hours.    Gently clean the area and apply surgical ointment (such as " "Vaseline petrolatum ointment, which is available \"over the counter\" and not a prescription) to the biopsy site for up to 2 weeks straight.  This acts to protect the wound from the outside world.      Sutures are not usually placed in this procedure.  If any sutures were placed, return for suture removal as instructed (generally 1 week for the face, 2 weeks for the body).      Take Acetaminophen (Tylenol) for discomfort, if no contraindications.  Ibuprofen or aspirin could make bleeding worse.    Call our office immediately for signs of infection: fever, chills, increased redness, warmth, tenderness, discomfort/pain, or pus or foul smell coming from the wound.    WHAT TO DO IF THERE IS ANY BLEEDING?  If a small amount of bleeding is noticed, place a clean cloth over the area and apply firm pressure for ten minutes.  Check the wound after 10 minutes of direct pressure.  If bleeding persists, try one more time for an additional 10 minutes of direct pressure on the area.  If the bleeding becomes heavier or does not stop after the second attempt, or if you have any other questions about this procedure, then please call your [facility] Dermatologist by calling [phone] (SKIN).     I hereby acknowledge that I have reviewed and verified the site with my Dermatologist and have requested and authorized my Dermatologist to proceed with the procedure.    .    CHERRY ANGIOMAS     Physical Exam:  Anatomic Location Affected:  Trunk and extremities,SCALP  Morphological Description:  Scattered cherry red papules  Denies pain, itch, bleeding. No treatments tried. Present for years. Present constantly; no modifying factors which make it worse or better.     Assessment and Plan:  Based on a thorough discussion of this condition and the management approach to it (including a comprehensive discussion of the known risks, side effects and potential benefits of treatment), the patient (family) agrees to implement the following specific " "plan:  Reassure benign        SEBORRHEIC KERATOSIS; NON-INFLAMED     Physical Exam:  Anatomic Location Affected:  Trunk and extremities  Morphological Description:  Waxy, smooth to warty textured, yellow to brownish-grey to dark brown to blackish, discrete, \"stuck-on\" appearing papules.  Present for years. Denies pain, itch, bleeding.      Additional History of Present Condition:  Present constantly; no modifying factors which make it worse or better. No prior treatment.       Assessment and Plan:  Based on a thorough discussion of this condition and the management approach to it (including a comprehensive discussion of the known risks, side effects and potential benefits of treatment), the patient (family) agrees to implement the following specific plan:  Reassure benign  Use sun protection.  Apply SPF 30 or higher at least three times a day.  Wear sun protecting clothing and hats.        SOLAR LENTIGINES   OTHER SKIN CHANGES DUE TO CHRONIC EXPOSURE TO NONIONIZING RADIATION     Physical Exam:  Anatomic Location Affected:  Sun exposed areas of back, chest, arms, legs  Morphological Description:  Multiple scattered brown to tan evenly pigmented macules   Denies pain, itch, bleeding. No treatments tried. Present for months - years. Reports getting newer lesions with sun exposure.         Assessment and Plan:  Based on a thorough discussion of this condition and the management approach to it (including a comprehensive discussion of the known risks, side effects and potential benefits of treatment), the patient (family) agrees to implement the following specific plan:  Reassure benign  Use sun protection.  Apply SPF 30 or higher at least three times a day.  Wear sun protecting clothing and hats.         MULTIPLE MELANOCYTIC NEVI (\"Moles\")     Physical Exam:  Anatomic Location Affected: Trunk and extremities  Morphological Description:  Scattered, round to ovoid, symmetrical-appearing, even bordered, skin colored to dark " brown macules/papules  Denies pain, itch, bleeding. No treatments tried. Present for years. Present constantly; no modifying factors which make it worse or better. Denies actively changing or growing moles.      Assessment and Plan:  Based on a thorough discussion of this condition and the management approach to it (including a comprehensive discussion of the known risks, side effects and potential benefits of treatment), the patient (family) agrees to implement the following specific plan:  Reassure benign  Monitor for changes  Use sun protection.  Apply SPF 30 or higher at least three times a day.  Wear sun protecting clothing and hats.       Worrisome signs of skin malignancy discussed, questions answered. Regular self-skin check discussed. Advised to call or return to office if patient notices any spots of concern, rapidly growing/changing lesions, bleeding lesions, non-healing lesions. Advised regular SPF use.        ACTINIC KERATOSES  - Relevant exam: On the Face  are scaly pink macules without palpable dermal component    - Exam and clinical history consistent with actinic keratoses  - Discussed that these lesions are considered premalignant with the potential to evolve into squamous cell carcinoma.   - Discussed treatment options, which may inclue liquid nitrogen destruction, topical immunotherapy including risks, benefits  - Patient counseled to return to the office in 4-6 weeks after completion of treatment for recheck if not resolved at which time retreatment or biopsy to rule out SCC will be determined based on clinic findings    OPTION 1:     - The patient agreed to treatment with combination efudex/calcipotriene for **4 days BID to the face; 6 days BID to the extremities/trunk; 7 days BID to the scalp** - Common side effects for this treatment were discussed and handout provided  - Prescription ordered through [facility]. Informed patient that [facility] would contact patient to obtain  "billing information and shipping address. Patient provided the below preferred contact information for pharmacy:       OPTION 2:     - The patient agreed to treatment with topical efudex 5% for **7 days BID to the face; 14 days BID to the extremities/trunk; 21 days BID to the scalp**   - Common side effects for this treatment were discussed     OPTION 3:    PROCEDURE:  DESTRUCTION OF PRE-MALIGNANT LESIONS    - After a thorough discussion of treatment options and risk/benefits/alternatives (including but not limited to local pain, scarring, dyspigmentation, blistering, and possible superinfection), verbal and written consent were obtained and the aforementioned lesions were treated on with cryotherapy using liquid nitrogen x 1 cycle for 5-10 seconds.    TOTAL NUMBER of 7 pre-malignant lesions were treated today on the ANATOMIC LOCATION: face.     The patient tolerated the procedure well, and after-care instructions were provided.         XEROSIS (\"DRY SKIN\")    Physical Exam:  Anatomic Location Affected: extremities,back,trunk  Morphological Description:  xerotic papules and plaques  Pertinent Positives:  Pertinent Negatives:    Additional History of Present Condition:    - Current treatments: none    Assessment and Plan:  - History and physical consistent with xerosis  - Educated that dry skin is often exacerbated by low humidity conditions and during change of seasons  - Educated that gentle skin care and consistent use of emollients are the mainstay of treatment. Recommended use of occlusive emollient, such as vaseline, aquaphor or aveeno eczema balm. If unable to tolerate, noted that thick white cream is next best.   Based on a thorough discussion of this condition and the management approach to it (including a comprehensive discussion of the known risks, side effects and potential benefits of treatment), the patient (family) agrees to implement the following specific plan:    Start daily emollient to moist " skin.  Take luke warm showers (not hot)  Avoid scratching.   Transition to fragrance free, non-irritating skin care as possible.      Tip for relief: If itchy or uncomfortable, placing emollient (like vaseline) in the fridge is a great trick as the cool sensation is soothing.               Scribe Attestation      I,:  Prisca Gamez MA am acting as a scribe while in the presence of the attending physician.:       I,:  Bruce Lindsay MD personally performed the services described in this documentation    as scribed in my presence.:

## 2024-10-28 NOTE — TELEPHONE ENCOUNTER
Patient calling inquiring if in need to continue with Infusion appointments. Pt received lab work in [facility] and saw Hemoglobin is normal.      Please review and advise.  Thank you

## 2024-10-28 NOTE — PATIENT INSTRUCTIONS
"INFORMED CONSENT DISCUSSION AND POST-OPERATIVE INSTRUCTIONS FOR PATIENT    I.  RATIONALE FOR PROCEDURE  I understand that a skin biopsy allows the Dermatologist to test a lesion or rash under the microscope to obtain a diagnosis.  It usually involves numbing the area with numbing medication and removing a small piece of skin; sometimes the area will be closed with sutures. In this specific procedure, sutures are not usually needed.  If any sutures are placed, then they are usually need to be removed in 2 weeks or less.    I understand that my Dermatologist recommends that a skin \"shave\" biopsy be performed today.  A local anesthetic, similar to the kind that a dentist uses when filling a cavity, will be injected with a very small needle into the skin area to be sampled.  The injected skin and tissue underneath \"will go to sleep” and become numb so no pain should be felt afterwards.  An instrument shaped like a tiny \"razor blade\" (shave biopsy instrument) will be used to cut a small piece of tissue and skin from the area so that a sample of tissue can be taken and examined more closely under the microscope.  A slight amount of bleeding will occur, but it will be stopped with direct pressure and a pressure bandage and any other appropriate methods.  I understands that a scar will form where the wound was created.  Surgical ointment will be applied to help protect the wound.  Sutures are not usually needed.    II.  RISKS AND POTENTIAL COMPLICATIONS   I understand the risks and potential complications of a skin biopsy include but are not limited to the following:  Bleeding  Infection  Pain  Scar/keloid  Skin discoloration  Incomplete Removal  Recurrence  Nerve Damage/Numbness/Loss of Function  Allergic Reaction to Anesthesia  Biopsies are diagnostic procedures and based on findings additional treatment or evaluation may be required  Loss or destruction of specimen resulting in no additional findings    My Dermatologist " "has explained to me the nature of the condition, the nature of the procedure, and the benefits to be reasonably expected compared with alternative approaches.  My Dermatologist has discussed the likelihood of major risks or complications of this procedure including the specific risks listed above, such as bleeding, infection, and scarring/keloid.  I understand that a scar is expected after this procedure.  I understand that my physician cannot predict if the scar will form a \"keloid,\" which extends beyond the borders of the wound that is created.  A keloid is a thick, painful, and bumpy scar.  A keloid can be difficult to treat, as it does not always respond well to therapy, which includes injecting cortisone directly into the keloid every few weeks.  While this usually reduces the pain and size of the scar, it does not eliminate it.      I understand that photographs may be taken before and after the procedure.  These will be maintained as part of the medical providers confidential records and may not be made available to me.  I further authorize the medical provider to use the photographs for teaching purposes or to illustrate scientific papers, books, or lectures if in his/her judgment, medical research, education, or science may benefit from its use.    I have had an opportunity to fully inquire about the risks and benefits of this procedure and its alternatives.   I have been given ample time and opportunity to ask questions and to seek a second opinion if I wished to do so.  I acknowledge that there have specifically been no guarantees as to the cosmetic results from the procedure.  I am aware that with any procedure there is always the possibility of an unexpected complication.    III. POST-PROCEDURAL CARE (WHAT YOU WILL NEED TO DO \"AFTER THE BIOPSY\" TO OPTIMIZE HEALING)    Keep the area clean and dry.  Try NOT to remove the bandage or get it wet for the first 24 hours.    Gently clean the area and apply " "surgical ointment (such as Vaseline petrolatum ointment, which is available \"over the counter\" and not a prescription) to the biopsy site for up to 2 weeks straight.  This acts to protect the wound from the outside world.      Sutures are not usually placed in this procedure.  If any sutures were placed, return for suture removal as instructed (generally 1 week for the face, 2 weeks for the body).      Take Acetaminophen (Tylenol) for discomfort, if no contraindications.  Ibuprofen or aspirin could make bleeding worse.    Call our office immediately for signs of infection: fever, chills, increased redness, warmth, tenderness, discomfort/pain, or pus or foul smell coming from the wound.    WHAT TO DO IF THERE IS ANY BLEEDING?  If a small amount of bleeding is noticed, place a clean cloth over the area and apply firm pressure for ten minutes.  Check the wound after 10 minutes of direct pressure.  If bleeding persists, try one more time for an additional 10 minutes of direct pressure on the area.  If the bleeding becomes heavier or does not stop after the second attempt, or if you have any other questions about this procedure, then please call your [facility]'s Dermatologist by calling [phone] (SKIN).     I hereby acknowledge that I have reviewed and verified the site with my Dermatologist and have requested and authorized my Dermatologist to proceed with the procedure.           ELAINE ANGIOMAS       Assessment and Plan:  Based on a thorough discussion of this condition and the management approach to it (including a comprehensive discussion of the known risks, side effects and potential benefits of treatment), the patient (family) agrees to implement the following specific plan:  Reassure benign        SEBORRHEIC KERATOSIS; NON-INFLAMED               Assessment and Plan:  Based on a thorough discussion of this condition and the management approach to it (including a comprehensive discussion of the known risks, side " "effects and potential benefits of treatment), the patient (family) agrees to implement the following specific plan:  Reassure benign  Use sun protection.  Apply SPF 30 or higher at least three times a day.  Wear sun protecting clothing and hats.        SOLAR LENTIGINES   OTHER SKIN CHANGES DUE TO CHRONIC EXPOSURE TO NONIONIZING RADIATION             Assessment and Plan:  Based on a thorough discussion of this condition and the management approach to it (including a comprehensive discussion of the known risks, side effects and potential benefits of treatment), the patient (family) agrees to implement the following specific plan:  Reassure benign  Use sun protection.  Apply SPF 30 or higher at least three times a day.  Wear sun protecting clothing and hats.         MULTIPLE MELANOCYTIC NEVI (\"Moles\")          Assessment and Plan:  Based on a thorough discussion of this condition and the management approach to it (including a comprehensive discussion of the known risks, side effects and potential benefits of treatment), the patient (family) agrees to implement the following specific plan:  Reassure benign  Monitor for changes  Use sun protection.  Apply SPF 30 or higher at least three times a day.  Wear sun protecting clothing and hats.       Worrisome signs of skin malignancy discussed, questions answered. Regular self-skin check discussed. Advised to call or return to office if patient notices any spots of concern, rapidly growing/changing lesions, bleeding lesions, non-healing lesions. Advised regular SPF use.          ACTINIC KERATOSES  - Relevant exam: On the Face  are scaly pink macules without palpable dermal component    - Exam and clinical history consistent with actinic keratoses  - Discussed that these lesions are considered premalignant with the potential to evolve into squamous cell carcinoma.   - Discussed treatment options, which may inclue liquid nitrogen destruction, topical immunotherapy including " "risks, benefits  - Patient counseled to return to the office in 4-6 weeks after completion of treatment for recheck if not resolved at which time retreatment or biopsy to rule out SCC will be determined based on clinic findings    OPTION 3:    PROCEDURE:  DESTRUCTION OF PRE-MALIGNANT LESIONS    - After a thorough discussion of treatment options and risk/benefits/alternatives (including but not limited to local pain, scarring, dyspigmentation, blistering, and possible superinfection), verbal and written consent were obtained and the aforementioned lesions were treated on with cryotherapy using liquid nitrogen x 1 cycle for 5-10 seconds.    TOTAL NUMBER of 7 pre-malignant lesions were treated today on the ANATOMIC LOCATION: face.     The patient tolerated the procedure well, and after-care instructions were provided.         XEROSIS (\"DRY SKIN\")          Assessment and Plan:  - History and physical consistent with xerosis  - Educated that dry skin is often exacerbated by low humidity conditions and during change of seasons  - Educated that gentle skin care and consistent use of emollients are the mainstay of treatment. Recommended use of occlusive emollient, such as vaseline, aquaphor or aveeno eczema balm. If unable to tolerate, noted that thick white cream is next best.   Based on a thorough discussion of this condition and the management approach to it (including a comprehensive discussion of the known risks, side effects and potential benefits of treatment), the patient (family) agrees to implement the following specific plan:    Start daily emollient to moist skin.  Take luke warm showers (not hot)  Avoid scratching.   Transition to fragrance free, non-irritating skin care as possible.      Tip for relief: If itchy or uncomfortable, placing emollient (like vaseline) in the fridge is a great trick as the cool sensation is soothing.           "

## 2024-11-04 NOTE — ANESTHESIA POSTPROCEDURE EVALUATION
Post-Op Assessment Note    CV Status:  Stable    Pain management: adequate       Mental Status:  Alert and awake   Hydration Status:  Euvolemic   PONV Controlled:  Controlled   Airway Patency:  Patent     Post Op Vitals Reviewed: Yes    No anethesia notable event occurred.    Staff: Anesthesiologist           Last Filed PACU Vitals:  Vitals Value Taken Time   Temp 98.2 °F (36.8 °C) 09/30/24 1603   Pulse 59 09/30/24 1603   /66 09/30/24 1603   Resp 20 09/30/24 1602   SpO2 93 % 09/30/24 1603       Modified Santos:  No data recorded

## 2024-11-05 PROCEDURE — 88341 IMHCHEM/IMCYTCHM EA ADD ANTB: CPT | Performed by: STUDENT IN AN ORGANIZED HEALTH CARE EDUCATION/TRAINING PROGRAM

## 2024-11-05 PROCEDURE — 88305 TISSUE EXAM BY PATHOLOGIST: CPT | Performed by: STUDENT IN AN ORGANIZED HEALTH CARE EDUCATION/TRAINING PROGRAM

## 2024-11-05 PROCEDURE — 88342 IMHCHEM/IMCYTCHM 1ST ANTB: CPT | Performed by: STUDENT IN AN ORGANIZED HEALTH CARE EDUCATION/TRAINING PROGRAM

## 2024-11-05 NOTE — RESULT ENCOUNTER NOTE
DERMATOPATHOLOGY RESULT NOTE    Results reviewed by ordering physician.  Called patient to personally discuss results. Discussed results with patient.     Instructions for Clinical Derm Team:   (remember to route Result Note to appropriate staff):    Call patient and schedule for Cryotherapy in about a months time.     Result & Plan by Specimen:    Specimen A: Premalignant   Plan: clinic appointment for liquid nitrogen    Specimen B: Premalignant  Plan: clinic appointment for liquid nitrogen      Specimen C: Premalignant   Plan: clinic appointment for liquid nitrogen      A. Skin, left cheek, shave biopsy:    ACTINIC KERATOSIS with adnexal extension (see note).    Note: SOX10 and p16 immunostains were reviewed and support the diagnosis. Multiple levels examined. If the lesion were to progress or persist, additional sampling should be sought.     B. Skin, upper mid back, shave biopsy:    HYPERTROPHIC ACTINIC KERATOSIS (see note).    Note:  SOX10 and p16 immunostains were reviewed and support the diagnosis. Multiple levels examined. If the lesion were to progress or persist, additional sampling should be sought.     C. Skin,right mid back, shave biopsy:    ACTINIC KERATOSIS, inflamed, and focal foreign body giant cell reaction to keratinous debris (see note).    Note: SOX10 and p16 immunostains were reviewed and support the diagnosis. Multiple levels examined. If the lesion were to progress or persist, additional sampling should be sought.        Electronically signed by Stu Yeager MD on 11/5/2024 at 10:58 AM

## 2024-11-07 ENCOUNTER — HOSPITAL ENCOUNTER (OUTPATIENT)
Dept: INFUSION CENTER | Facility: CLINIC | Age: 74
End: 2024-11-07

## 2024-11-08 DIAGNOSIS — N25.81 SECONDARY HYPERPARATHYROIDISM OF RENAL ORIGIN (HCC): ICD-10-CM

## 2024-11-08 DIAGNOSIS — I12.9 PARENCHYMAL RENAL HYPERTENSION, STAGE 1 THROUGH STAGE 4 OR UNSPECIFIED CHRONIC KIDNEY DISEASE: ICD-10-CM

## 2024-11-08 DIAGNOSIS — I10 ESSENTIAL HYPERTENSION: ICD-10-CM

## 2024-11-08 RX ORDER — HYDRALAZINE HYDROCHLORIDE 25 MG/1
25 TABLET, FILM COATED ORAL 2 TIMES DAILY
Qty: 180 TABLET | Refills: 3 | Status: SHIPPED | OUTPATIENT
Start: 2024-11-08

## 2024-11-14 ENCOUNTER — APPOINTMENT (OUTPATIENT)
Dept: LAB | Facility: AMBULARY SURGERY CENTER | Age: 74
End: 2024-11-14
Payer: MEDICARE

## 2024-11-14 DIAGNOSIS — D80.1 HYPOGAMMAGLOBULINEMIA (HCC): ICD-10-CM

## 2024-11-14 PROCEDURE — 36415 COLL VENOUS BLD VENIPUNCTURE: CPT

## 2024-11-14 PROCEDURE — 86317 IMMUNOASSAY INFECTIOUS AGENT: CPT

## 2024-11-18 LAB — C TETANI IGG SER IA-ACNC: >7 IU/ML

## 2024-11-19 LAB
C DIPHTHERIAE AB SER IA-ACNC: >3 IU/ML
DEPRECATED S PNEUM14 IGG SER-MCNC: 8.7 UG/ML
DEPRECATED S PNEUM19 IGG SER-MCNC: 4.4 UG/ML
DEPRECATED S PNEUM23 IGG SER-MCNC: 7.7 UG/ML
DEPRECATED S PNEUM3 IGG SER-MCNC: 0.8 UG/ML
DEPRECATED S PNEUM4 IGG SER-MCNC: 1.6 UG/ML
DEPRECATED S PNEUM8 AB SER-MCNC: 3.4 UG/ML
DEPRECATED S PNEUM9 IGG SER-MCNC: 0.7 UG/ML
FLUBV RNA SPEC QL NAA+PROBE: 0.4 UG/ML
S PNEUM DA 18C IGG SER-MCNC: 1.1 UG/ML
S PNEUM DA 19A IGG SER-MCNC: 3.1 UG/ML
S PNEUM DA 6B IGG SER-MCNC: 4.5 UG/ML
SL AMB PNEUMO AB TYPE 17 (17F): 0.2 UG/ML
SL AMB PNEUMO AB TYPE 20: 1.4 UG/ML
SL AMB PNEUMO AB TYPE 22 (22F): 4.5 UG/ML
SL AMB PNEUMO AB TYPE 2: 0.3 UG/ML
SL AMB PNEUMO AB TYPE 34 (10A): 8.3 UG/ML
SL AMB PNEUMO AB TYPE 43 (11A): 4.5 UG/ML
SL AMB PNEUMO AB TYPE 54 (15B): 1.9 UG/ML
SL AMB PNEUMO AB TYPE 5: 4.6 UG/ML
SL AMB PNEUMO AB TYPE 70 (33F): 5 UG/ML
STREP PNEUMO TYPE 1: 2.1 UG/ML
STREP PNEUMO TYPE 51: 1.2 UG/ML
STREP PNEUMO TYPE 68: 5.5 UG/ML

## 2024-11-20 DIAGNOSIS — D62 ACUTE BLOOD LOSS ANEMIA: ICD-10-CM

## 2024-11-20 RX ORDER — SUCRALFATE 1 G/1
1 TABLET ORAL
Qty: 270 TABLET | Refills: 2 | Status: SHIPPED | OUTPATIENT
Start: 2024-11-20

## 2024-12-02 ENCOUNTER — APPOINTMENT (OUTPATIENT)
Dept: LAB | Facility: AMBULARY SURGERY CENTER | Age: 74
End: 2024-12-02
Payer: MEDICARE

## 2024-12-02 ENCOUNTER — OFFICE VISIT (OUTPATIENT)
Dept: DERMATOLOGY | Facility: CLINIC | Age: 74
End: 2024-12-02
Payer: MEDICARE

## 2024-12-02 VITALS — TEMPERATURE: 97.7 F

## 2024-12-02 DIAGNOSIS — K92.2 LOWER GI BLEED: ICD-10-CM

## 2024-12-02 DIAGNOSIS — N18.5 ANEMIA IN STAGE 5 CHRONIC KIDNEY DISEASE, NOT ON CHRONIC DIALYSIS  (HCC): ICD-10-CM

## 2024-12-02 DIAGNOSIS — R60.0 EDEMA OF EXTREMITY: ICD-10-CM

## 2024-12-02 DIAGNOSIS — E11.21 DIABETIC NEPHROPATHY ASSOCIATED WITH TYPE 2 DIABETES MELLITUS (HCC): ICD-10-CM

## 2024-12-02 DIAGNOSIS — I12.0 BENIGN HYPERTENSIVE KIDNEY DISEASE WITH CHRONIC KIDNEY DISEASE STAGE V OR END STAGE RENAL DISEASE (HCC): ICD-10-CM

## 2024-12-02 DIAGNOSIS — N18.5 STAGE 5 CHRONIC KIDNEY DISEASE NOT ON CHRONIC DIALYSIS (HCC): ICD-10-CM

## 2024-12-02 DIAGNOSIS — L57.0 KERATOSIS, ACTINIC: Primary | ICD-10-CM

## 2024-12-02 DIAGNOSIS — E55.9 VITAMIN D DEFICIENCY: Chronic | ICD-10-CM

## 2024-12-02 DIAGNOSIS — D48.5 NEOPLASM OF UNCERTAIN BEHAVIOR OF SKIN: ICD-10-CM

## 2024-12-02 DIAGNOSIS — E78.2 MIXED HYPERLIPIDEMIA: ICD-10-CM

## 2024-12-02 DIAGNOSIS — D63.1 ANEMIA IN STAGE 5 CHRONIC KIDNEY DISEASE, NOT ON CHRONIC DIALYSIS  (HCC): ICD-10-CM

## 2024-12-02 DIAGNOSIS — N25.81 SECONDARY HYPERPARATHYROIDISM OF RENAL ORIGIN (HCC): Chronic | ICD-10-CM

## 2024-12-02 LAB
ERYTHROCYTE [DISTWIDTH] IN BLOOD BY AUTOMATED COUNT: 16.8 % (ref 11.6–15.1)
HCT VFR BLD AUTO: 38.2 % (ref 34.8–46.1)
HGB BLD-MCNC: 12.8 G/DL (ref 11.5–15.4)
MCH RBC QN AUTO: 29.1 PG (ref 26.8–34.3)
MCHC RBC AUTO-ENTMCNC: 33.5 G/DL (ref 31.4–37.4)
MCV RBC AUTO: 87 FL (ref 82–98)
PLATELET # BLD AUTO: 262 THOUSANDS/UL (ref 149–390)
PMV BLD AUTO: 11.7 FL (ref 8.9–12.7)
RBC # BLD AUTO: 4.4 MILLION/UL (ref 3.81–5.12)
WBC # BLD AUTO: 10.34 THOUSAND/UL (ref 4.31–10.16)

## 2024-12-02 PROCEDURE — 88305 TISSUE EXAM BY PATHOLOGIST: CPT | Performed by: STUDENT IN AN ORGANIZED HEALTH CARE EDUCATION/TRAINING PROGRAM

## 2024-12-02 PROCEDURE — 17003 DESTRUCT PREMALG LES 2-14: CPT

## 2024-12-02 PROCEDURE — 99214 OFFICE O/P EST MOD 30 MIN: CPT

## 2024-12-02 PROCEDURE — 88342 IMHCHEM/IMCYTCHM 1ST ANTB: CPT | Performed by: STUDENT IN AN ORGANIZED HEALTH CARE EDUCATION/TRAINING PROGRAM

## 2024-12-02 PROCEDURE — 17000 DESTRUCT PREMALG LESION: CPT

## 2024-12-02 PROCEDURE — 11102 TANGNTL BX SKIN SINGLE LES: CPT

## 2024-12-02 PROCEDURE — 85027 COMPLETE CBC AUTOMATED: CPT

## 2024-12-02 PROCEDURE — 88341 IMHCHEM/IMCYTCHM EA ADD ANTB: CPT | Performed by: STUDENT IN AN ORGANIZED HEALTH CARE EDUCATION/TRAINING PROGRAM

## 2024-12-02 PROCEDURE — 36415 COLL VENOUS BLD VENIPUNCTURE: CPT

## 2024-12-02 NOTE — PATIENT INSTRUCTIONS
"ACTINIC KERATOSES      Assessment and Plan:  Based on a thorough discussion of this condition and the management approach to it (including a comprehensive discussion of the known risks, side effects and potential benefits of treatment), the patient (family) agrees to implement the following specific plan:  Treated with cryotherapy today; written and verbal consent obtained       Patient instructions:  Your pre-cancerous lesions (called actinic keratosis) were treated with liquid nitrogen today. The treated areas will get more red, crusted over the next few days. There might be some blistering. Apply vaseline to the treated area for the next week to help it heal fully. Do not pick at the area. Return in 3-4 weeks for another round of liquid nitrogen treatment if lesion(s)  fails to fully resolve.    NEOPLASM OF UNCERTAIN BEHAVIOR OF SKIN    Assessment and Plan:  I have discussed with the patient that a sample of skin via a \"skin biopsy” would be potentially helpful to further make a specific diagnosis under the microscope.  Based on a thorough discussion of this condition and the management approach to it (including a comprehensive discussion of the known risks, side effects and potential benefits of treatment), the patient (family) agrees to implement the following specific plan:    Procedure:  Skin Biopsy.  After a thorough discussion of treatment options and risk/benefits/alternatives (including but not limited to local pain, scarring, dyspigmentation, blistering, possible superinfection, and inability to confirm a diagnosis via histopathology), verbal and written consent were obtained and portion of the rash was biopsied for tissue sample.  See below for consent that was obtained from patient and subsequent Procedure Note.        INFORMED CONSENT DISCUSSION AND POST-OPERATIVE INSTRUCTIONS FOR PATIENT    I.  RATIONALE FOR PROCEDURE  I understand that a skin biopsy allows the Dermatologist to test a lesion or rash " "under the microscope to obtain a diagnosis.  It usually involves numbing the area with numbing medication and removing a small piece of skin; sometimes the area will be closed with sutures. In this specific procedure, sutures are not usually needed.  If any sutures are placed, then they are usually need to be removed in 2 weeks or less.    I understand that my Dermatologist recommends that a skin \"shave\" biopsy be performed today.  A local anesthetic, similar to the kind that a dentist uses when filling a cavity, will be injected with a very small needle into the skin area to be sampled.  The injected skin and tissue underneath \"will go to sleep” and become numb so no pain should be felt afterwards.  An instrument shaped like a tiny \"razor blade\" (shave biopsy instrument) will be used to cut a small piece of tissue and skin from the area so that a sample of tissue can be taken and examined more closely under the microscope.  A slight amount of bleeding will occur, but it will be stopped with direct pressure and a pressure bandage and any other appropriate methods.  I understands that a scar will form where the wound was created.  Surgical ointment will be applied to help protect the wound.  Sutures are not usually needed.    II.  RISKS AND POTENTIAL COMPLICATIONS   I understand the risks and potential complications of a skin biopsy include but are not limited to the following:  Bleeding  Infection  Pain  Scar/keloid  Skin discoloration  Incomplete Removal  Recurrence  Nerve Damage/Numbness/Loss of Function  Allergic Reaction to Anesthesia  Biopsies are diagnostic procedures and based on findings additional treatment or evaluation may be required  Loss or destruction of specimen resulting in no additional findings    My Dermatologist has explained to me the nature of the condition, the nature of the procedure, and the benefits to be reasonably expected compared with alternative approaches.  My Dermatologist has " "discussed the likelihood of major risks or complications of this procedure including the specific risks listed above, such as bleeding, infection, and scarring/keloid.  I understand that a scar is expected after this procedure.  I understand that my physician cannot predict if the scar will form a \"keloid,\" which extends beyond the borders of the wound that is created.  A keloid is a thick, painful, and bumpy scar.  A keloid can be difficult to treat, as it does not always respond well to therapy, which includes injecting cortisone directly into the keloid every few weeks.  While this usually reduces the pain and size of the scar, it does not eliminate it.      I understand that photographs may be taken before and after the procedure.  These will be maintained as part of the medical providers confidential records and may not be made available to me.  I further authorize the medical provider to use the photographs for teaching purposes or to illustrate scientific papers, books, or lectures if in his/her judgment, medical research, education, or science may benefit from its use.    I have had an opportunity to fully inquire about the risks and benefits of this procedure and its alternatives.   I have been given ample time and opportunity to ask questions and to seek a second opinion if I wished to do so.  I acknowledge that there have specifically been no guarantees as to the cosmetic results from the procedure.  I am aware that with any procedure there is always the possibility of an unexpected complication.    III. POST-PROCEDURAL CARE (WHAT YOU WILL NEED TO DO \"AFTER THE BIOPSY\" TO OPTIMIZE HEALING)    Keep the area clean and dry.  Try NOT to remove the bandage or get it wet for the first 24 hours.    Gently clean the area and apply surgical ointment (such as Vaseline petrolatum ointment, which is available \"over the counter\" and not a prescription) to the biopsy site for up to 2 weeks straight.  This acts to " protect the wound from the outside world.      Sutures are not usually placed in this procedure.  If any sutures were placed, return for suture removal as instructed (generally 1 week for the face, 2 weeks for the body).      Take Acetaminophen (Tylenol) for discomfort, if no contraindications.  Ibuprofen or aspirin could make bleeding worse.    Call our office immediately for signs of infection: fever, chills, increased redness, warmth, tenderness, discomfort/pain, or pus or foul smell coming from the wound.    WHAT TO DO IF THERE IS ANY BLEEDING?  If a small amount of bleeding is noticed, place a clean cloth over the area and apply firm pressure for ten minutes.  Check the wound after 10 minutes of direct pressure.  If bleeding persists, try one more time for an additional 10 minutes of direct pressure on the area.  If the bleeding becomes heavier or does not stop after the second attempt, or if you have any other questions about this procedure, then please call your [facility]'s Dermatologist by calling [phone] (SKIN).     I hereby acknowledge that I have reviewed and verified the site with my Dermatologist and have requested and authorized my Dermatologist to proceed with the procedure.

## 2024-12-02 NOTE — PROGRESS NOTES
"[facility]at[facility] Note     Patient Name: Trina Davis  Encounter Date: 12/2/24     Have you been cared for by a [facility] Dermatologist in the last 3 years and, if so, which description applies to you?    Yes.  I have been here within the last 3 years, and my medical history has NOT changed since that time.  I am FEMALE/of child-bearing potential.    REVIEW OF SYSTEMS:  Have you recently had or currently have any of the following? No changes in my recent health.   PAST MEDICAL HISTORY:  Have you personally ever had or currently have any of the following?  If \"YES,\" then please provide more detail. No changes in my medical history.   HISTORY OF IMMUNOSUPPRESSION: Do you have a history of any of the following:  Systemic Immunosuppression such as Diabetes, Biologic or Immunotherapy, Chemotherapy, Organ Transplantation, Bone Marrow Transplantation or Prednisone?  No     Answering \"YES\" requires the addition of the dotphrase \"IMMUNOSUPPRESSED\" as the first diagnosis of the patient's visit.   FAMILY HISTORY:  Any \"first degree relatives\" (parent, brother, sister, or child) with the following?    No changes in my family's known health.   PATIENT EXPERIENCE:    Do you want the Dermatologist to perform a COMPLETE skin exam today including a clinical examination under the \"bra and underwear\" areas?  NO  If necessary, do we have your permission to call and leave a detailed message on your Preferred Phone number that includes your specific medical information?  Yes      Allergies   Allergen Reactions    Ciprofloxacin Hives    Pollen Extract Allergic Rhinitis    Sulfamethoxazole-Trimethoprim Tongue Swelling      Current Outpatient Medications:     acetaminophen (TYLENOL) 500 mg tablet, Take 650 mg by mouth as needed, Disp: , Rfl:     albuterol (Ventolin HFA) 90 mcg/act inhaler, Inhale 2 puffs every 6 (six) hours as needed for wheezing, Disp: 54 g, Rfl: 0    amLODIPine (NORVASC) 5 mg tablet, Take 2 tablets (10 mg " total) by mouth daily, Disp: 180 tablet, Rfl: 1    aspirin (ECOTRIN LOW STRENGTH) 81 mg EC tablet, Take 1 tablet (81 mg total) by mouth daily Do not start before October 31, 2023., Disp: , Rfl: 0    B-D ULTRAFINE III SHORT PEN 31G X 8 MM MISC, , Disp: , Rfl: 3    Calcium Citrate 250 MG TABS, Take 2 tablets (500 mg total) by mouth in the morning, Disp: , Rfl:     Coenzyme Q10 (CoQ10) 100 MG CAPS, Take 100 mg by mouth in the morning Bed time, Disp: , Rfl:     Cyanocobalamin (Vitamin B12) 1000 MCG TBCR, Take 1,000 mcg by mouth once a week, Disp: , Rfl:     escitalopram (LEXAPRO) 10 mg tablet, Take 1 tablet (10 mg total) by mouth daily, Disp: 90 tablet, Rfl: 3    gabapentin (NEURONTIN) 100 mg capsule, Take 100 mg by mouth every 8 (eight) hours as needed, Disp: , Rfl:     hydrALAZINE (APRESOLINE) 25 mg tablet, Take 1 tablet (25 mg total) by mouth 2 (two) times a day, Disp: 180 tablet, Rfl: 3    Icosapent Ethyl (Vascepa) 1 g CAPS, Take 2 capsules (2 g total) by mouth 2 (two) times a day, Disp: 90 capsule, Rfl: 3    insulin aspart (NovoLOG) 100 units/mL injection, Inject under the skin 3 (three) times a day before meals 5units, 5units, 12units., Disp: , Rfl:     insulin detemir (Levemir FlexTouch) 100 Units/mL injection pen, Inject 20 Units under the skin every evening, Disp: , Rfl:     levothyroxine 100 mcg tablet, TAKE 1 TABLET BY MOUTH EVERY DAY, Disp: 90 tablet, Rfl: 1    methenamine hippurate (HIPREX) 1 g tablet, Take 1 tablet (1 g total) by mouth 2 (two) times a day with meals, Disp: 180 tablet, Rfl: 3    metoprolol tartrate (LOPRESSOR) 50 mg tablet, Take 1 tablet (50 mg total) by mouth every 12 (twelve) hours, Disp: 180 tablet, Rfl: 1    pantoprazole (PROTONIX) 40 mg tablet, Take 1 tablet (40 mg total) by mouth 2 (two) times a day, Disp: 60 tablet, Rfl: 0    pramipexole (MIRAPEX) 0.25 mg tablet, TAKE 1 TABLET (0.25 MG TOTAL) BY MOUTH DAILY AT BEDTIME, Disp: 90 tablet, Rfl: 1    Probiotic Product (PROBIOTIC BLEND  PO), Take by mouth, Disp: , Rfl:     rosuvastatin (CRESTOR) 5 mg tablet, Take 1 tablet (5 mg total) by mouth daily, Disp: 90 tablet, Rfl: 3    sucralfate (CARAFATE) 1 g tablet, TAKE 1 TABLET (1 G TOTAL) BY MOUTH 3 (THREE) TIMES A DAY WITH MEALS, Disp: 270 tablet, Rfl: 2    torsemide (DEMADEX) 10 mg tablet, TAKE 10 MG. DAILY EXCEPT MONDAY, WEDNESDAY, FRIDAY 20 MG., Disp: 150 tablet, Rfl: 1    Vitamin D, Cholecalciferol, 25 MCG (1000 UT) TABS, Take 2,000 Units by mouth in the morning, Disp: , Rfl:           Whom besides the patient is providing clinical information about today's encounter?   NO ADDITIONAL HISTORIAN (patient alone provided history)    Physical Exam and Assessment/Plan by Diagnosis:    ACTINIC KERATOSES    Physical Exam:  Anatomic Location Affected:  left cheek, upper mid back, right mid back  Morphological Description:  Thin pink papule(s) with gritty scale, healed scars from prior biopsies    Additional History of Present Condition: Patient presents as a follow up for cryotherapy for 3 biopsy proven actinic keratoses 1 left cheek, 1 upper mid back, 1 right mid back. Other actinic keratoses noted on left cheek during visit.     Assessment and Plan:  Based on a thorough discussion of this condition and the management approach to it (including a comprehensive discussion of the known risks, side effects and potential benefits of treatment), the patient (family) agrees to implement the following specific plan:  Treated with cryotherapy today; written and verbal consent obtained. See procedure below.       PROCEDURE:  DESTRUCTION OF PRE-MALIGNANT LESIONS  After a thorough discussion of treatment options and risk/benefits/alternatives (including but not limited to local pain, scarring, dyspigmentation, blistering, and possible superinfection), verbal and written consent were obtained and the aforementioned lesions were treated on with cryotherapy using liquid nitrogen x 2 cycles for 5-10 seconds. The patient  "tolerated the procedure well, and after-care instructions were provided.     TOTAL NUMBER of 7 pre-malignant lesions were treated today on the ANATOMIC LOCATION: x5 left cheek, 1 upper mid back, 1 right mid back.       Patient instructions:  Your pre-cancerous lesions (called actinic keratosis) were treated with liquid nitrogen today. The treated areas will get more red, crusted over the next few days. There might be some blistering. Apply vaseline to the treated area for the next week to help it heal fully. Do not pick at the area. Return in 3-4 weeks for another round of liquid nitrogen treatment if lesion(s)  fails to fully resolve.    NEOPLASM OF UNCERTAIN BEHAVIOR OF SKIN    Physical Exam:  (Anatomic Location); (Size and Morphological Description); (Differential Diagnosis):  Specimen A: Right upper medial lower leg; 0.3 cm x 0.3 pink papule; DDX squamous cell carcinoma vs actinic keratosis      Additional History of Present Condition:  Patient has a new spot on her right leg that has been painful and has grown in the last few weeks.    Assessment and Plan:  I have discussed with the patient that a sample of skin via a \"skin biopsy” would be potentially helpful to further make a specific diagnosis under the microscope.  Based on a thorough discussion of this condition and the management approach to it (including a comprehensive discussion of the known risks, side effects and potential benefits of treatment), the patient (family) agrees to implement the following specific plan:    Procedure:  Skin Biopsy.  After a thorough discussion of treatment options and risk/benefits/alternatives (including but not limited to local pain, scarring, dyspigmentation, blistering, possible superinfection, and inability to confirm a diagnosis via histopathology), verbal and written consent were obtained and portion of the rash was biopsied for tissue sample.  See below for consent that was obtained from patient and subsequent " "Procedure Note.  Will call with results.     PROCEDURE TANGENTIAL (SHAVE) BIOPSY NOTE:    Performing Physician:  Marley To PA-C  Anatomic Location; Clinical Description with size (cm); Pre-Op Diagnosis:   Specimen A: Right upper medial lower leg; 0.3 cm x 0.3 pink papule; DDX: squamous cell carcinoma vs actinic keratosis  Post-op diagnosis: Same     Local anesthesia: 1% xylocaine with epi      Topical anesthesia: None    Hemostasis: Aluminum chloride       After obtaining informed consent  at which time there was a discussion about the purpose of biopsy  and low risks of infection and bleeding.  The area was prepped and draped in the usual fashion. Anesthesia was obtained with 1% lidocaine with epinephrine. A shave biopsy to an appropriate sampling depth was obtained by Shave (Dermablade or 15 blade) The resulting wound was covered with surgical ointment and bandaged appropriately.     The patient tolerated the procedure well without complications and was without signs of functional compromise.      Specimen has been sent for review by Dermatopathology.    Standard post-procedure care has been explained and has been included in written form within the patient's copy of Informed Consent.    INFORMED CONSENT DISCUSSION AND POST-OPERATIVE INSTRUCTIONS FOR PATIENT    I.  RATIONALE FOR PROCEDURE  I understand that a skin biopsy allows the Dermatologist to test a lesion or rash under the microscope to obtain a diagnosis.  It usually involves numbing the area with numbing medication and removing a small piece of skin; sometimes the area will be closed with sutures. In this specific procedure, sutures are not usually needed.  If any sutures are placed, then they are usually need to be removed in 2 weeks or less.    I understand that my Dermatologist recommends that a skin \"shave\" biopsy be performed today.  A local anesthetic, similar to the kind that a dentist uses when filling a cavity, will be injected with a very " "small needle into the skin area to be sampled.  The injected skin and tissue underneath \"will go to sleep” and become numb so no pain should be felt afterwards.  An instrument shaped like a tiny \"razor blade\" (shave biopsy instrument) will be used to cut a small piece of tissue and skin from the area so that a sample of tissue can be taken and examined more closely under the microscope.  A slight amount of bleeding will occur, but it will be stopped with direct pressure and a pressure bandage and any other appropriate methods.  I understands that a scar will form where the wound was created.  Surgical ointment will be applied to help protect the wound.  Sutures are not usually needed.    II.  RISKS AND POTENTIAL COMPLICATIONS   I understand the risks and potential complications of a skin biopsy include but are not limited to the following:  Bleeding  Infection  Pain  Scar/keloid  Skin discoloration  Incomplete Removal  Recurrence  Nerve Damage/Numbness/Loss of Function  Allergic Reaction to Anesthesia  Biopsies are diagnostic procedures and based on findings additional treatment or evaluation may be required  Loss or destruction of specimen resulting in no additional findings    My Dermatologist has explained to me the nature of the condition, the nature of the procedure, and the benefits to be reasonably expected compared with alternative approaches.  My Dermatologist has discussed the likelihood of major risks or complications of this procedure including the specific risks listed above, such as bleeding, infection, and scarring/keloid.  I understand that a scar is expected after this procedure.  I understand that my physician cannot predict if the scar will form a \"keloid,\" which extends beyond the borders of the wound that is created.  A keloid is a thick, painful, and bumpy scar.  A keloid can be difficult to treat, as it does not always respond well to therapy, which includes injecting cortisone directly into " "the keloid every few weeks.  While this usually reduces the pain and size of the scar, it does not eliminate it.      I understand that photographs may be taken before and after the procedure.  These will be maintained as part of the medical providers confidential records and may not be made available to me.  I further authorize the medical provider to use the photographs for teaching purposes or to illustrate scientific papers, books, or lectures if in his/her judgment, medical research, education, or science may benefit from its use.    I have had an opportunity to fully inquire about the risks and benefits of this procedure and its alternatives.   I have been given ample time and opportunity to ask questions and to seek a second opinion if I wished to do so.  I acknowledge that there have specifically been no guarantees as to the cosmetic results from the procedure.  I am aware that with any procedure there is always the possibility of an unexpected complication.    III. POST-PROCEDURAL CARE (WHAT YOU WILL NEED TO DO \"AFTER THE BIOPSY\" TO OPTIMIZE HEALING)    Keep the area clean and dry.  Try NOT to remove the bandage or get it wet for the first 24 hours.    Gently clean the area and apply surgical ointment (such as Vaseline petrolatum ointment, which is available \"over the counter\" and not a prescription) to the biopsy site for up to 2 weeks straight.  This acts to protect the wound from the outside world.      Sutures are not usually placed in this procedure.  If any sutures were placed, return for suture removal as instructed (generally 1 week for the face, 2 weeks for the body).      Take Acetaminophen (Tylenol) for discomfort, if no contraindications.  Ibuprofen or aspirin could make bleeding worse.    Call our office immediately for signs of infection: fever, chills, increased redness, warmth, tenderness, discomfort/pain, or pus or foul smell coming from the wound.    WHAT TO DO IF THERE IS ANY " BLEEDING?  If a small amount of bleeding is noticed, place a clean cloth over the area and apply firm pressure for ten minutes.  Check the wound after 10 minutes of direct pressure.  If bleeding persists, try one more time for an additional 10 minutes of direct pressure on the area.  If the bleeding becomes heavier or does not stop after the second attempt, or if you have any other questions about this procedure, then please call your [facility]'s Dermatologist by calling [phone] (SKIN).     I hereby acknowledge that I have reviewed and verified the site with my Dermatologist and have requested and authorized my Dermatologist to proceed with the procedure.      Scribe Attestation      I,:  Camden Porter MA am acting as a scribe while in the presence of the attending physician.:       I,:  Marley To PA-C personally performed the services described in this documentation    as scribed in my presence.:

## 2024-12-04 ENCOUNTER — OFFICE VISIT (OUTPATIENT)
Dept: INTERNAL MEDICINE CLINIC | Facility: CLINIC | Age: 74
End: 2024-12-04
Payer: MEDICARE

## 2024-12-04 VITALS
HEART RATE: 53 BPM | SYSTOLIC BLOOD PRESSURE: 140 MMHG | OXYGEN SATURATION: 98 % | DIASTOLIC BLOOD PRESSURE: 76 MMHG | BODY MASS INDEX: 26.58 KG/M2 | RESPIRATION RATE: 16 BRPM | HEIGHT: 63 IN | WEIGHT: 150 LBS | TEMPERATURE: 97.6 F

## 2024-12-04 DIAGNOSIS — G62.9 NEUROPATHY: Primary | ICD-10-CM

## 2024-12-04 DIAGNOSIS — K63.81 DIEULAFOY LESION OF COLON: ICD-10-CM

## 2024-12-04 DIAGNOSIS — Z12.31 ENCOUNTER FOR SCREENING MAMMOGRAM FOR BREAST CANCER: ICD-10-CM

## 2024-12-04 DIAGNOSIS — K21.9 GASTROESOPHAGEAL REFLUX DISEASE WITHOUT ESOPHAGITIS: Chronic | ICD-10-CM

## 2024-12-04 DIAGNOSIS — M79.18 MUSCULOSKELETAL PAIN: ICD-10-CM

## 2024-12-04 PROBLEM — Z91.81 AT HIGH RISK FOR FALLS: Status: RESOLVED | Noted: 2024-07-17 | Resolved: 2024-12-04

## 2024-12-04 PROBLEM — R00.2 PALPITATIONS: Status: RESOLVED | Noted: 2023-07-18 | Resolved: 2024-12-04

## 2024-12-04 PROBLEM — R74.01 TRANSAMINITIS: Status: RESOLVED | Noted: 2024-06-03 | Resolved: 2024-12-04

## 2024-12-04 PROBLEM — F32.9 REACTIVE DEPRESSION: Chronic | Status: RESOLVED | Noted: 2024-07-17 | Resolved: 2024-12-04

## 2024-12-04 PROBLEM — S02.31XA FRACTURE OF ORBITAL FLOOR, RIGHT SIDE, INITIAL ENCOUNTER FOR CLOSED FRACTURE (HCC): Status: RESOLVED | Noted: 2023-03-22 | Resolved: 2024-12-04

## 2024-12-04 PROBLEM — B02.29 HZV (HERPES ZOSTER VIRUS) POST HERPETIC NEURALGIA: Status: RESOLVED | Noted: 2023-01-06 | Resolved: 2024-12-04

## 2024-12-04 PROBLEM — R07.89 ATYPICAL CHEST PAIN: Status: RESOLVED | Noted: 2024-09-30 | Resolved: 2024-12-04

## 2024-12-04 PROBLEM — D62 ACUTE BLOOD LOSS ANEMIA: Status: RESOLVED | Noted: 2024-09-24 | Resolved: 2024-12-04

## 2024-12-04 PROBLEM — M70.21 OLECRANON BURSITIS OF RIGHT ELBOW: Status: RESOLVED | Noted: 2024-07-29 | Resolved: 2024-12-04

## 2024-12-04 PROBLEM — K92.2 LOWER GI BLEED: Status: RESOLVED | Noted: 2024-09-24 | Resolved: 2024-12-04

## 2024-12-04 PROCEDURE — 99213 OFFICE O/P EST LOW 20 MIN: CPT | Performed by: INTERNAL MEDICINE

## 2024-12-04 PROCEDURE — G2211 COMPLEX E/M VISIT ADD ON: HCPCS | Performed by: INTERNAL MEDICINE

## 2024-12-04 RX ORDER — GABAPENTIN 100 MG/1
100 CAPSULE ORAL 2 TIMES DAILY
Qty: 60 CAPSULE | Refills: 0 | Status: SHIPPED | OUTPATIENT
Start: 2024-12-04

## 2024-12-04 NOTE — ASSESSMENT & PLAN NOTE
Secondary to diabetes, she takes gabapentin 100mg when needed once or twice a day.    Orders:    gabapentin (NEURONTIN) 100 mg capsule; Take 1 capsule (100 mg total) by mouth 2 (two) times a day

## 2024-12-04 NOTE — ASSESSMENT & PLAN NOTE
Pain over hemithorax does not appear to be cardiac related or associated with breast diseases, but she is due for a screening mammogram and will proceed with that, this is most likely musculoskeletal.

## 2024-12-04 NOTE — ASSESSMENT & PLAN NOTE
Most recently hospitalization due to lower GI bleeding, her hemoglobin has stabilized and she did not need further IV iron infusion.  She was placed back on aspirin and no further bleeding noted.

## 2024-12-04 NOTE — PROGRESS NOTES
Name: Trina Davis      : 1950      MRN: [id number]  Encounter Provider: Maryse Rae MD  Encounter Date: 2024   Encounter department: [facility]  :  Assessment & Plan  Neuropathy  Secondary to diabetes, she takes gabapentin 100mg when needed once or twice a day.    Orders:    gabapentin (NEURONTIN) 100 mg capsule; Take 1 capsule (100 mg total) by mouth 2 (two) times a day    Encounter for screening mammogram for breast cancer    Gastroesophageal reflux disease without esophagitis  Follows with GI, continue pantoprazole and carafate.          Dieulafoy lesion of colon  Most recently hospitalization due to lower GI bleeding, her hemoglobin has stabilized and she did not need further IV iron infusion.  She was placed back on aspirin and no further bleeding noted.         Musculoskeletal pain  Pain over hemithorax does not appear to be cardiac related or associated with breast diseases, but she is due for a screening mammogram and will proceed with that, this is most likely musculoskeletal.                   History of Present Illness     Patient presents in office for a follow-up visit.  Most recent blood work reviewed.  Had lower GI bleed recently hospitalized for colonoscopy, she received IV iron infusion during hospitalization, her hemoglobin stabilized she did not need outpatient further IV iron infusion.  She resumed the aspirin and no bleeding since then.  She does have frail skin and has noticed more easy bruising but no other major bleeding.  Patient continued to have knee pain more on the right and has been having injections due to osteoarthritis.  She is also concerned about having a surgery and further worsening her kidney function which is possible to happen since she is already stage V kidney disease.  Recommended to use knee brace for temporary relief of her symptoms.  Patient also has a complaining of left side hemithoracic pain that is intermittent,  started on the axillary line and extends to her left breast.  The pain is mild to moderate but he has went away on its own.  She has not used anything for this.  She states she did have a similar pain in the past on the right side.  She denies any other associated symptoms like palpitations, substernal chest pain, arm pain, change in skin over the breast or breast discharge.        Review of Systems   Constitutional:  Positive for fatigue.   Eyes:  Negative for visual disturbance.   Respiratory:  Negative for cough and shortness of breath.    Cardiovascular:  Positive for chest pain (Over the left breast and left anterior axillary line).   Gastrointestinal:  Positive for abdominal pain (Occasionally depending on what she eats, mostly epigastric). Negative for abdominal distention, blood in stool, nausea and vomiting.   Musculoskeletal:  Positive for arthralgias.   Skin:  Negative for rash.   Neurological:  Negative for dizziness and headaches.   Psychiatric/Behavioral:  Negative for confusion and sleep disturbance.      Current Outpatient Medications on File Prior to Visit   Medication Sig Dispense Refill    acetaminophen (TYLENOL) 500 mg tablet Take 650 mg by mouth as needed      albuterol (Ventolin HFA) 90 mcg/act inhaler Inhale 2 puffs every 6 (six) hours as needed for wheezing 54 g 0    amLODIPine (NORVASC) 5 mg tablet Take 2 tablets (10 mg total) by mouth daily 180 tablet 1    aspirin (ECOTRIN LOW STRENGTH) 81 mg EC tablet Take 1 tablet (81 mg total) by mouth daily Do not start before October 31, 2023.  0    B-D ULTRAFINE III SHORT PEN 31G X 8 MM MISC   3    Calcium Citrate 250 MG TABS Take 2 tablets (500 mg total) by mouth in the morning      Coenzyme Q10 (CoQ10) 100 MG CAPS Take 100 mg by mouth in the morning Bed time      Cyanocobalamin (Vitamin B12) 1000 MCG TBCR Take 1,000 mcg by mouth once a week      escitalopram (LEXAPRO) 10 mg tablet Take 1 tablet (10 mg total) by mouth daily 90 tablet 3    hydrALAZINE  (APRESOLINE) 25 mg tablet Take 1 tablet (25 mg total) by mouth 2 (two) times a day 180 tablet 3    Icosapent Ethyl (Vascepa) 1 g CAPS Take 2 capsules (2 g total) by mouth 2 (two) times a day 90 capsule 3    insulin aspart (NovoLOG) 100 units/mL injection Inject under the skin 3 (three) times a day before meals 5units, 5units, 12units.      insulin detemir (Levemir FlexTouch) 100 Units/mL injection pen Inject 20 Units under the skin every evening      levothyroxine 100 mcg tablet TAKE 1 TABLET BY MOUTH EVERY DAY 90 tablet 1    methenamine hippurate (HIPREX) 1 g tablet Take 1 tablet (1 g total) by mouth 2 (two) times a day with meals 180 tablet 3    metoprolol tartrate (LOPRESSOR) 50 mg tablet Take 1 tablet (50 mg total) by mouth every 12 (twelve) hours 180 tablet 1    pantoprazole (PROTONIX) 40 mg tablet Take 1 tablet (40 mg total) by mouth 2 (two) times a day 60 tablet 0    pramipexole (MIRAPEX) 0.25 mg tablet TAKE 1 TABLET (0.25 MG TOTAL) BY MOUTH DAILY AT BEDTIME 90 tablet 1    Probiotic Product (PROBIOTIC BLEND PO) Take by mouth      rosuvastatin (CRESTOR) 5 mg tablet Take 1 tablet (5 mg total) by mouth daily 90 tablet 3    sucralfate (CARAFATE) 1 g tablet TAKE 1 TABLET (1 G TOTAL) BY MOUTH 3 (THREE) TIMES A DAY WITH MEALS 270 tablet 2    torsemide (DEMADEX) 10 mg tablet TAKE 10 MG. DAILY EXCEPT MONDAY, WEDNESDAY, FRIDAY 20 MG. 150 tablet 1    Vitamin D, Cholecalciferol, 25 MCG (1000 UT) TABS Take 2,000 Units by mouth in the morning      [DISCONTINUED] gabapentin (NEURONTIN) 100 mg capsule Take 100 mg by mouth every 8 (eight) hours as needed       No current facility-administered medications on file prior to visit.      Social History     Tobacco Use    Smoking status: Former     Current packs/day: 0.00     Average packs/day: 2.0 packs/day for 10.0 years (20.0 ttl pk-yrs)     Types: Cigarettes     Start date: 1966     Quit date: 1976     Years since quittin.9     Passive exposure: Past    Smokeless  "tobacco: Never    Tobacco comments:     Smoked 2 pack a day   Vaping Use    Vaping status: Never Used   Substance and Sexual Activity    Alcohol use: Yes     Comment: 1 or 2 a year    Drug use: No    Sexual activity: Not Currently     Partners: Male     Birth control/protection: Abstinence, Post-menopausal, Female Sterilization     Comment: defer        Objective   /76 (BP Location: Left arm, Patient Position: Sitting, Cuff Size: Adult)   Pulse (!) 53   Temp 97.6 °F (36.4 °C) (Tympanic)   Resp 16   Ht 5' 3\" (1.6 m)   Wt 68 kg (150 lb)   SpO2 98%   BMI 26.57 kg/m²      Physical Exam  Constitutional:       General: She is not in acute distress.  HENT:      Head: Normocephalic and atraumatic.   Cardiovascular:      Rate and Rhythm: Normal rate and regular rhythm.   Pulmonary:      Effort: Pulmonary effort is normal. No respiratory distress.   Chest:      Chest wall: Tenderness (On palpation of the pectoralis muscle) present. No mass.   Breasts:     Bhupendra Score is 5.      Right: Normal.      Left: Normal. No swelling, inverted nipple, mass or skin change.   Musculoskeletal:      Right lower leg: No edema.      Left lower leg: No edema.   Lymphadenopathy:      Upper Body:      Left upper body: No axillary adenopathy.   Skin:     General: Skin is warm and dry.   Neurological:      Mental Status: She is alert and oriented to person, place, and time.   Psychiatric:         Thought Content: Thought content normal.         Judgment: Judgment normal.         "

## 2024-12-05 PROCEDURE — 88341 IMHCHEM/IMCYTCHM EA ADD ANTB: CPT | Performed by: STUDENT IN AN ORGANIZED HEALTH CARE EDUCATION/TRAINING PROGRAM

## 2024-12-05 PROCEDURE — 88342 IMHCHEM/IMCYTCHM 1ST ANTB: CPT | Performed by: STUDENT IN AN ORGANIZED HEALTH CARE EDUCATION/TRAINING PROGRAM

## 2024-12-05 PROCEDURE — 88305 TISSUE EXAM BY PATHOLOGIST: CPT | Performed by: STUDENT IN AN ORGANIZED HEALTH CARE EDUCATION/TRAINING PROGRAM

## 2024-12-06 ENCOUNTER — RESULTS FOLLOW-UP (OUTPATIENT)
Dept: DERMATOLOGY | Facility: CLINIC | Age: 74
End: 2024-12-06

## 2024-12-06 NOTE — RESULT ENCOUNTER NOTE
DERMATOPATHOLOGY RESULT NOTE    Results reviewed and discussed with Dr. Ocampo which demonstrated a SQUAMOUS CELL CARCINOMA IN SITU; extends to the tissue edges on the right upper medial lower leg. This is malignant. Appropriate treatment options include excision versus ED and C. Patient will additionally need 6 month skin exams. Called patient and discussed results and treatment options including pros and cons of each. Patient elected for excision. Will send message to [facility] Clinical pool for scheduling of excision.     Instructions for Clinical Derm Team:   (remember to route Result Note to appropriate staff):    Call patient and schedule for excision. Patient prefers [facility].     Result & Plan by Specimen:    Specimen A: malignant  Plan: excision     [id number]  Order: [id number]   Status: Final result      Dx: Neoplasm of uncertain behavior of skin    Test Result Released: Yes (seen)    View Follow-Up Encounter     Component  Ref Range & Units (hover)   Case Report  Surgical Pathology Report                         Case: [id number]                                  Authorizing Provider:  Marley To PA-C     Collected:           12/02/2024 1324              Ordering Location:     [facility]      Received:            12/02/2024 1324                                     [place]                                                                Pathologist:           Stu Yeager MD                                                          Specimen:    Skin, Other, a. right upper medial lower leg                                            Final Diagnosis  A. Skin, right upper medial lower leg, shave biopsy:    SQUAMOUS CELL CARCINOMA IN SITU; extends to the tissue edges (see note).    Note: SOX10, p16, and p40 immunostains were reviewed and support the diagnosis. Multiple levels examined.    Electronically signed by Stu Yeager MD on 12/5/2024 at 1319 EST  Additional  "Information   All reported additional testing was performed with appropriately reactive controls.  These tests were developed and their performance characteristics determined by [facility] or appropriate performing facility, though some tests may be performed on tissues which have not been validated for performance characteristics (such as staining performed on alcohol exposed cell blocks and decalcified tissues).  Results should be interpreted with caution and in the context of the patients' clinical condition. These tests may not be cleared or approved by the U.[place]. Food and Drug Administration, though the FDA has determined that such clearance or approval is not necessary. These tests are used for clinical purposes and they should not be regarded as investigational or for research. This laboratory has been approved by [facility], designated as a high-complexity laboratory and is qualified to perform these tests.  .  Gross Description    A. The specimen is received in formalin, labeled with the patient's name and hospital number, and is designated \" right upper medial lower leg\".  The specimen consists of 1 tan nonhairbearing shave of skin measuring 0.5 x 0.4 x 0.1 cm.  The surfaces partially keratotic.  The margin resection is in green, and the unoriented surface tips are inked red.  Entirely submitted. One cassette, bisected.    Note: The estimated total formalin fixation time based upon information provided by the submitting clinician and the standard processing schedule is under 72 hours.  MS[place]  Clinical Information   ATTENTION:  DERMPATH GROUP    SPECIMEN A; Skin; Anatomic Location: right upper medial lower leg; Procedure/Protocol: Skin Specimen (submit in [facility]):Tangential Biopsy (includes shave, scoop, saucerization, curette) (CPT 50035; each additional tangential biopsy is CPT 46936)  74 y.o. year old  Female with a Morphological Description:0.3 cm x 0.3 cm pink papule  Differential " Diagnosis and/or Specific Clinical Question:squamous cell carcinoma vs actinic keratosis  Resulting Agency [facility]          Specimen Collected: 12/02/24  1:24 PM Last Resulted: 12/05/24  1:19 PM

## 2024-12-12 ENCOUNTER — TELEPHONE (OUTPATIENT)
Age: 74
End: 2024-12-12

## 2024-12-12 DIAGNOSIS — Z12.31 ENCOUNTER FOR SCREENING MAMMOGRAM FOR MALIGNANT NEOPLASM OF BREAST: Primary | ICD-10-CM

## 2024-12-12 NOTE — TELEPHONE ENCOUNTER
Patient called checking on a phone call she was promised to receive to be scheduled for an excision that she never received.  Scheduled patient next available

## 2024-12-16 ENCOUNTER — HOSPITAL ENCOUNTER (OUTPATIENT)
Facility: HOSPITAL | Age: 74
Discharge: HOME | End: 2024-12-16
Payer: MEDICARE

## 2024-12-16 VITALS — WEIGHT: 150 LBS | BODY MASS INDEX: 26.58 KG/M2 | HEIGHT: 63 IN

## 2024-12-16 DIAGNOSIS — Z12.31 ENCOUNTER FOR SCREENING MAMMOGRAM FOR MALIGNANT NEOPLASM OF BREAST: ICD-10-CM

## 2024-12-16 PROCEDURE — 77067 SCR MAMMO BI INCL CAD: CPT

## 2024-12-16 PROCEDURE — 77063 BREAST TOMOSYNTHESIS BI: CPT

## 2024-12-18 ENCOUNTER — RESULTS FOLLOW-UP (OUTPATIENT)
Dept: INTERNAL MEDICINE CLINIC | Facility: CLINIC | Age: 74
End: 2024-12-18

## 2024-12-24 ENCOUNTER — PROCEDURE VISIT (OUTPATIENT)
Age: 74
End: 2024-12-24
Payer: MEDICARE

## 2024-12-24 VITALS
DIASTOLIC BLOOD PRESSURE: 90 MMHG | HEART RATE: 70 BPM | OXYGEN SATURATION: 98 % | WEIGHT: 153 LBS | TEMPERATURE: 97.5 F | SYSTOLIC BLOOD PRESSURE: 163 MMHG | BODY MASS INDEX: 27.1 KG/M2

## 2024-12-24 DIAGNOSIS — D09.9 SQUAMOUS CELL CARCINOMA IN SITU (SCCIS): Primary | ICD-10-CM

## 2024-12-24 PROCEDURE — 11602 EXC TR-EXT MAL+MARG 1.1-2 CM: CPT | Performed by: REGISTERED NURSE

## 2024-12-24 PROCEDURE — 88305 TISSUE EXAM BY PATHOLOGIST: CPT | Performed by: PATHOLOGY

## 2024-12-24 PROCEDURE — 12032 INTMD RPR S/A/T/EXT 2.6-7.5: CPT | Performed by: REGISTERED NURSE

## 2024-12-24 RX ORDER — DOXYCYCLINE 100 MG/1
100 CAPSULE ORAL EVERY 12 HOURS SCHEDULED
Qty: 14 CAPSULE | Refills: 0 | Status: SHIPPED | OUTPATIENT
Start: 2024-12-24 | End: 2024-12-31

## 2024-12-24 NOTE — PATIENT INSTRUCTIONS
"     YOUR \"AFTER SURGERY\" REVIEW & INSTRUCTIONS    What to Know About Your Procedure  An \"excision\" was performed today to allow the dermatologist to remove a skin lesion. The procedure involves a local numbing medication and removing the entire lesion (or as much as possible). Typically, the lesion is being removed because it does not look \"normal,\" because it is becoming irritated and traumatized, or for significant appearance reasons.  The skin was cut deeply and then repaired - usually with sutures (stitches).  The removed tissue has been sent to the pathologist who will confirm the diagnosis and verify if the lesion has been completely removed.  Surgical “Vaseline-type” ointment has been applied after the procedure to help create a barrier between your wound and the outside world.     The advantage of using sutures (stitches) to repair a skin excision is that it allows the lesion to heal as quickly as possible with the least amount of scarring and risk of infection, Still, there are some risks and potential complications that you should watch out for that include but are not limited to the following:    Some bleeding is normal at the time of procedure and some bleeding on the gauze bandage after the procedure is normal for the first few days after surgery.  Profuse bleeding or bleeding with swelling and pain is NOT normal and should be reported as detailed below.  Infection is uncommon after skin surgery.  Infection should be reported and is indicated by pain, redness, and discharge of purulent material.  Some pain may occur initially the day after surgery.  Persistent pain or increasing pain days after surgery is not expected and should be reported.  Every effort is made to minimize scar, but location, size, and genetics do play a role in scar appearance.  A surgical wound does not achieve its optimal appearance until 6 months.  There are several treatments available if scarring would be problematic including " "scar creams, silicone pad, laser and scar revision.  Skin discoloration can occur especially in people of color.  Its important to avoid sun on wound in first 6 months after surgery.  Treatment is available if pigment is problematic.  Incomplete removal of the lesion or recurrence of lesion can occur and - depending on the lesion - this would then require further treatment and more surgery.  Nerve damage/numbness and/or loss of function is very rare, but is most likely to occur if the lesion being removed is large or if it is in a \"high risk\" location.  Allergic reaction to lidocaine is rare.  More commonly, epinephrine is used with the lidocaine, and, occasionally, epinephrine (a.k.a., adrenalin) may cause a brief feeling of anxiety or jitteriness.  The person at the microscope (pathologist) may provide additional information that was unexpected. This unexpected finding could prompt the need for additional treatment or evaluation.    At-Home Wound Care  Try NOT to remove the pressure bandage for 48 hours. Keep the area clean and dry while this bandage is on.   After removing the bandage for the first time, gently clean the area with soap and water. If the bandage is difficult to remove, getting the bandage wet in the shower will sometimes help soften the adhesive and allow it to be removed more easily.   You will now need to cleanse this area daily in the shower with gentle soap. There is no need to scrub the area. You will need to apply plain Vaseline ointment (this is over the counter and not a prescription) to the site for up to 2 weeks followed by a clean appropriately sized bandage to area.  Non stick dressing and paper tape (or Hypafix) are recommended for sensitive skin but a bandaid is fine if it covers the area well.  In general, sutures (stitches) are removed in about 5-7 days for face wounds and in about 12-14 days for the body wounds.  Your dermatologist wants you to return for suture removal in 10-14 " "DAYS.     General Restrictions  For TWO (2) DAYS:  You will need to take it very easy as this time is highest risk for bleeding. Being a \"couch potato\" during these two days is generally recommended.   For surgeries on the face/neck/scalp: Avoid leaning down to pick things up off the floor as this brings blood up to your head. Instead, squat down to pick things up.     For TWO WEEKS (14 DAYS):   No heavy lifting (anything greater than 10 pounds)   You can start to do slow, gentle activities such as slow walking but nothing to increase your heart rate and blood pressure too much (such as cardiovascular exercise).  It is important to take it easy as there is still a risk for bleeding and a high risk popping of stitches open during this time.     Site Specific Restrictions  If we did surgery near your eyes (including the nose, forehead, front part of your scalp, cheeks): It is VERY common to get a large amount of swelling around your eyes (puffy eyes). Although less frequent, this can be enough to swell your eyes shut and can also come along with bruising. This should not hurt and is very expected and normal. It is typically worst at ~ 3 days out from your surgery and dramatically better 1 week post-operatively.   If we did surgery around your nose: No blowing your nose as this puts you at higher risk of popping stitches durign this time. Instead dab under your nose with a tissue or use a Q-tip inside your nose.  If we did surgery on the skin above or below your lip or your lip itself: No sipping from straws as this uses a lot of the muscles around your mouth and increases the risk of popping stitches during this time.    Managing Your Pain After Surgery  You can expect to have some pain after surgery. This is normal. The pain is typically worse the first two days after surgery, and quickly begins to get better.   You can use heating pads or ice packs on your incisions to help reduce your pain.   The best strategy for " "controlling your pain after surgery is \"around the clock\" pain control. You can take \"over-the-counter\" (non-prescription) Acetaminophen (Tylenol) for discomfort, unless you have been told not to by your physician.  If you are taking Tylenol at the maximum dose, you can alternate Tylenol with Advil/Motrin (ibuprofen) as long as there are no contraindications.  Alternating these medications with each other (I.e., Tylenol followed by Motrin/Advil) allows you to maximize your pain control.  To alternate these medications properly, you will take a dose of pain medication every three hours, alternating Tylenol (acetaminophen) and Advil/Motrin (ibuprofen).  Start by taking 650 mg of Tylenol (2 pills of 325 mg)  3 hours later take 600 mg of Motrin (3 pills of 200 mg)  3 hours after taking the Motrin take 650 mg of Tylenol  3 hours after that take 600 mg of Motrin.    As an example, if your first dose of Tylenol (acetaminophen) is at 12:00 PM, then you would alternate with Motrin as directed below, continuing usually for no more than a total of 48 hours straight:     12:00 PM  Tylenol 650 mg (2 pills of 325 mg)    3:00 PM  Motrin 600 mg (3 pills of 200 mg)    6:00 PM  Tylenol 650 mg (2 pills of 325 mg)    9:00 PM  Motrin 600 mg (3 pills of 200 mg)      WARNING:  Do NOT take more than 4000 mg of Tylenol or 3200 mg of Motrin in a \"24-hour\" period.       What if you still have pain?    If you have pain that is not controlled with the over-the-counter pain medications (Tylenol and Motrin/Advil), do not hesitate to call our staff using the number provided. We will help make sure you are managing your pain in the best way possible, and if necessary, we can provide a prescription for additional pain medication.     Call our office IMMEDIATELY with any signs of wound infection.  This includes fever, chills, increasing redness, warmth, tenderness, severe discomfort/pain, or pus or foul smell coming from the wound. [facility] can be directly at [phone] (SKIN) and ask for the on-call Dermatologist covering surgery/Mohs.    If Bleeding is Noticed  If bleeding is soaking through the bandage, remove the bandage and see where the bleeding is coming from.  Place a clean cloth over the area and apply firm pressure directly to the area that is bleeding for thirty minutes.    Check the wound ONLY after 30 minutes of direct pressure; do not cheat and sneak a peak, as that does not count (i.e., resets the clock back to zero).  If bleeding persists after 30 minutes of legitimate direct pressure, then try one more round of direct pressure to the area.    Should bleeding become heavier or not stop after the second application of direct pressure for 30 minutes, then call [facility] directly at [phone] (SKIN) and ask to speak with the on-call Dermatologist covering surgery/Mohs.  If after hours, go to your nearest Emergency Room or Urgent Care and have the team call [facility] directly at [phone] (SKIN); you will be connected to our after hours team.

## 2024-12-24 NOTE — PROGRESS NOTES
"  PROCEDURE:  EXCISION NOTE     Procedural Plan:     Attending:  Dr. 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 We discussed that \"excision\" is a method of removing lesions, both benign and malignant lesions.  A portion of normal skin is often taken to ensure completeness of removal.  I reviewed that this procedure will include numbing the area, cutting around and under the skin lesion, undermining (\"freeing up\") surrounding tissue, and closing the wound with sutures (stitches) both inside and out.  Risks include and are not limited to the following:  Bleeding, pain, infection, scarring, recurrence, more required treatment, no additional information, numbness and/or loss of function (if nerves are damaged).  These risks were considered against the benefits that we discussed, and the patient opted to continue with the procedure. It was discussed with patient that every effort is made to minimize scarring, but scarring is influenced also by extrinsic factor such as location, age and genetics.     Procedural Time Out:      Correct patient? yes  Correct site per Clinic Report? yes  Correct site per previous Path Report? yes  Correct site per Patient's recollection? yes    Anesthesia:      Local anesthesia: 1% xylocaine with epi     Excision Description:      Post-Op Diagnosis: Same as Pre-Op Diagnosis (above)  Pre-op Size: 0.9 cm x 0.9 cm  Margins (enter \"0\" for lipoma/cyst or similar diagnosis): 0.4 cm  TOTAL POSTOPERATIVE DEFECT SIZE (I.e., Pre-op Size + Margins on both sides):  1.7 cm x " 1.7 cm    The patient was seated in the procedure/exam room, anesthetized locally, prepped and draped in the usual fashion. Using a #15 blade with a scalpel, the lesion was excised in elliptical fashion.     REQUIRED Melania MELANOMA DATA      This procedure was not performed to treat primary cutaneous melanoma through wide local excision      Closure Description:      The specific type of closure that was utilized:     INTERMEDIATE Closure  INTERMEDIATE CLOSURE     The patient was brought back into the procedure room, anesthetized locally, prepped and draped in the usual fashion. Using a #15 blade with a scalpel, the lesion was excised in elliptical fashion. The wound was  undermined in the fascial plane.  Purpose of undermining was to decrease wound tension and facilitate closure. Hemostasis was achieved with light electrocoagulation.    The wound was closed with subcutaneous sutures as follows:    Deep Suture Throw, Size, and Type (select all that apply):  Interrupted Deeps; 3-0; monocryl     Epidermal edge closure was accomplished with superficial sutures as follows:    Superficial Suture Throw, Size and Type (select all that apply):  Interrupted Horizontal Mattress; 3-0; Prolene    FINAL LENGTH OF CLOSURE (please enter a length even for lipoma/cyst or similar diagnosis): 5.1 cm       Postoperative Care:      The wound was cleaned with sterile saline, dried off, surgical vaseline ointment was applied, and the wound was covered. A pressure dressing was applied for stabilization and light pressure.    Estimated blood loss:  Less than 3ml.  Complications: none  Post-op medications: Doxycycline 100mg bid for 7 days, advised to take with food and full glass of water. Advised not to lay down within 30 minutes of taking medication and should protect herself from the Sun while on medication.   Additional notes: none    Discharge Plans:      Discharge plans: Plan for return to us for suture removal, as scheduled in 10-14  "days.   Patient condition at discharge: STABLE    The patient was given detailed oral and written instructions on postoperative care as detailed in consent. We urged the patient to call us if any problems or question should arise.         Please complete this section and then \"cut and paste\" it into the Patient Instructions section.  These notes should be printed and shared directly with the patient for review PRIOR TO leaving our office.         YOUR \"AFTER SURGERY\" REVIEW & INSTRUCTIONS    What to Know About Your Procedure  An \"excision\" was performed today to allow the dermatologist to remove a skin lesion. The procedure involves a local numbing medication and removing the entire lesion (or as much as possible). Typically, the lesion is being removed because it does not look \"normal,\" because it is becoming irritated and traumatized, or for significant appearance reasons.  The skin was cut deeply and then repaired - usually with sutures (stitches).  The removed tissue has been sent to the pathologist who will confirm the diagnosis and verify if the lesion has been completely removed.  Surgical “Vaseline-type” ointment has been applied after the procedure to help create a barrier between your wound and the outside world.     The advantage of using sutures (stitches) to repair a skin excision is that it allows the lesion to heal as quickly as possible with the least amount of scarring and risk of infection, Still, there are some risks and potential complications that you should watch out for that include but are not limited to the following:    Some bleeding is normal at the time of procedure and some bleeding on the gauze bandage after the procedure is normal for the first few days after surgery.  Profuse bleeding or bleeding with swelling and pain is NOT normal and should be reported as detailed below.  Infection is uncommon after skin surgery.  Infection should be reported and is indicated by pain, redness, and " "discharge of purulent material.  Some pain may occur initially the day after surgery.  Persistent pain or increasing pain days after surgery is not expected and should be reported.  Every effort is made to minimize scar, but location, size, and genetics do play a role in scar appearance.  A surgical wound does not achieve its optimal appearance until 6 months.  There are several treatments available if scarring would be problematic including scar creams, silicone pad, laser and scar revision.  Skin discoloration can occur especially in people of color.  Its important to avoid sun on wound in first 6 months after surgery.  Treatment is available if pigment is problematic.  Incomplete removal of the lesion or recurrence of lesion can occur and - depending on the lesion - this would then require further treatment and more surgery.  Nerve damage/numbness and/or loss of function is very rare, but is most likely to occur if the lesion being removed is large or if it is in a \"high risk\" location.  Allergic reaction to lidocaine is rare.  More commonly, epinephrine is used with the lidocaine, and, occasionally, epinephrine (a.k.a., adrenalin) may cause a brief feeling of anxiety or jitteriness.  The person at the microscope (pathologist) may provide additional information that was unexpected. This unexpected finding could prompt the need for additional treatment or evaluation.    At-Home Wound Care  Try NOT to remove the pressure bandage for 48 hours. Keep the area clean and dry while this bandage is on.   After removing the bandage for the first time, gently clean the area with soap and water. If the bandage is difficult to remove, getting the bandage wet in the shower will sometimes help soften the adhesive and allow it to be removed more easily.   You will now need to cleanse this area daily in the shower with gentle soap. There is no need to scrub the area. You will need to apply plain Vaseline ointment (this is over " "the counter and not a prescription) to the site for up to 2 weeks followed by a clean appropriately sized bandage to area.  Non stick dressing and paper tape (or Hypafix) are recommended for sensitive skin but a bandaid is fine if it covers the area well.  In general, sutures (stitches) are removed in about 5-7 days for face wounds and in about 12-14 days for the body wounds.  Your dermatologist wants you to return for suture removal in 10-14 DAYS.     General Restrictions  For TWO (2) DAYS:  You will need to take it very easy as this time is highest risk for bleeding. Being a \"couch potato\" during these two days is generally recommended.   For surgeries on the face/neck/scalp: Avoid leaning down to pick things up off the floor as this brings blood up to your head. Instead, squat down to pick things up.     For TWO WEEKS (14 DAYS):   No heavy lifting (anything greater than 10 pounds)   You can start to do slow, gentle activities such as slow walking but nothing to increase your heart rate and blood pressure too much (such as cardiovascular exercise).  It is important to take it easy as there is still a risk for bleeding and a high risk popping of stitches open during this time.     Site Specific Restrictions  If we did surgery near your eyes (including the nose, forehead, front part of your scalp, cheeks): It is VERY common to get a large amount of swelling around your eyes (puffy eyes). Although less frequent, this can be enough to swell your eyes shut and can also come along with bruising. This should not hurt and is very expected and normal. It is typically worst at ~ 3 days out from your surgery and dramatically better 1 week post-operatively.   If we did surgery around your nose: No blowing your nose as this puts you at higher risk of popping stitches durign this time. Instead dab under your nose with a tissue or use a Q-tip inside your nose.  If we did surgery on the skin above or below your lip or your lip " "itself: No sipping from straws as this uses a lot of the muscles around your mouth and increases the risk of popping stitches during this time.    Managing Your Pain After Surgery  You can expect to have some pain after surgery. This is normal. The pain is typically worse the first two days after surgery, and quickly begins to get better.   You can use heating pads or ice packs on your incisions to help reduce your pain.   The best strategy for controlling your pain after surgery is \"around the clock\" pain control. You can take \"over-the-counter\" (non-prescription) Acetaminophen (Tylenol) for discomfort, unless you have been told not to by your physician.  If you are taking Tylenol at the maximum dose, you can alternate Tylenol with Advil/Motrin (ibuprofen) as long as there are no contraindications.  Alternating these medications with each other (I.e., Tylenol followed by Motrin/Advil) allows you to maximize your pain control.  To alternate these medications properly, you will take a dose of pain medication every three hours, alternating Tylenol (acetaminophen) and Advil/Motrin (ibuprofen).  Start by taking 650 mg of Tylenol (2 pills of 325 mg)  3 hours later take 600 mg of Motrin (3 pills of 200 mg)  3 hours after taking the Motrin take 650 mg of Tylenol  3 hours after that take 600 mg of Motrin.    As an example, if your first dose of Tylenol (acetaminophen) is at 12:00 PM, then you would alternate with Motrin as directed below, continuing usually for no more than a total of 48 hours straight:     12:00 PM  Tylenol 650 mg (2 pills of 325 mg)    3:00 PM  Motrin 600 mg (3 pills of 200 mg)    6:00 PM  Tylenol 650 mg (2 pills of 325 mg)    9:00 PM  Motrin 600 mg (3 pills of 200 mg)      WARNING:  Do NOT take more than 4000 mg of Tylenol or 3200 mg of Motrin in a \"24-hour\" period.       What if you still have pain?    If you have pain that is not controlled with the over-the-counter pain medications (Tylenol and " Motrin/Advil), do not hesitate to call our staff using the number provided. We will help make sure you are managing your pain in the best way possible, and if necessary, we can provide a prescription for additional pain medication.     Call our office IMMEDIATELY with any signs of wound infection.  This includes fever, chills, increasing redness, warmth, tenderness, severe discomfort/pain, or pus or foul smell coming from the wound. [facility] can be directly at [phone] (SKIN) and ask for the on-call Dermatologist covering surgery/Mohs.    If Bleeding is Noticed  If bleeding is soaking through the bandage, remove the bandage and see where the bleeding is coming from.  Place a clean cloth over the area and apply firm pressure directly to the area that is bleeding for thirty minutes.    Check the wound ONLY after 30 minutes of direct pressure; do not cheat and sneak a peak, as that does not count (i.e., resets the clock back to zero).  If bleeding persists after 30 minutes of legitimate direct pressure, then try one more round of direct pressure to the area.    Should bleeding become heavier or not stop after the second application of direct pressure for 30 minutes, then call . [facility] directly at [phone] (SKIN) and ask to speak with the on-call Dermatologist covering surgery/Mohs.  If after hours, go to your nearest Emergency Room or Urgent Care and have the team call [facility] directly at [phone] (SKIN); you will be connected to our after hours team.           Scribe Attestation      I,:  Nathaly Cabral MA am acting as a scribe while in the presence of the attending physician.:       I,:  Bruce Lindsay MD personally performed the services described in this documentation    as scribed in my presence.:

## 2024-12-27 ENCOUNTER — RESULTS FOLLOW-UP (OUTPATIENT)
Age: 74
End: 2024-12-27

## 2024-12-27 DIAGNOSIS — N39.0 RECURRENT UTI: ICD-10-CM

## 2024-12-27 DIAGNOSIS — G25.81 RLS (RESTLESS LEGS SYNDROME): ICD-10-CM

## 2024-12-27 DIAGNOSIS — E78.2 MIXED HYPERLIPIDEMIA: ICD-10-CM

## 2024-12-27 PROCEDURE — 88305 TISSUE EXAM BY PATHOLOGIST: CPT | Performed by: PATHOLOGY

## 2024-12-27 RX ORDER — METHENAMINE HIPPURATE 1000 MG/1
1 TABLET ORAL 2 TIMES DAILY WITH MEALS
Qty: 180 TABLET | Refills: 1 | Status: SHIPPED | OUTPATIENT
Start: 2024-12-27

## 2024-12-27 RX ORDER — PRAMIPEXOLE DIHYDROCHLORIDE 0.25 MG/1
0.25 TABLET ORAL
Qty: 90 TABLET | Refills: 1 | Status: SHIPPED | OUTPATIENT
Start: 2024-12-27

## 2024-12-27 RX ORDER — ICOSAPENT ETHYL 1 G/1
2 CAPSULE ORAL 2 TIMES DAILY
Qty: 90 CAPSULE | Refills: 0 | Status: CANCELLED | OUTPATIENT
Start: 2024-12-27

## 2024-12-27 NOTE — RESULT ENCOUNTER NOTE
DERMATOPATHOLOGY RESULT NOTE    Results reviewed by ordering physician.  Called patient to personally discuss results. Discussed results with patient.       Instructions for Clinical Derm Team:   (remember to route Result Note to appropriate staff):    None    Result & Plan by Specimen:    Specimen A: malignant  Plan: margins clear      A. Skin, right upper medial lower leg:  FOCAL RESIDUAL SQUAMOUS CELL CARCINOMA IN-SITU AND FIBROSING GRANULATION TISSUE    Note: There is a zone of uninvolved tissue on all margins, and the lesion appears completely excised.  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      Electronically signed by Mando Samayoa MD on 12/27/2024 at 1321 ES

## 2025-01-01 ENCOUNTER — HOME CARE VISIT (OUTPATIENT)
Dept: HOME HOSPICE | Facility: HOSPICE | Age: 75
End: 2025-01-01
Payer: MEDICARE

## 2025-01-01 ENCOUNTER — TELEPHONE (OUTPATIENT)
Dept: OTHER | Facility: OTHER | Age: 75
End: 2025-01-01

## 2025-01-01 ENCOUNTER — HOME CARE VISIT (OUTPATIENT)
Dept: HOME HEALTH SERVICES | Facility: HOME HEALTHCARE | Age: 75
End: 2025-01-01
Payer: MEDICARE

## 2025-01-01 ENCOUNTER — HOME CARE VISIT (OUTPATIENT)
Dept: HOME HEALTH SERVICES | Facility: HOME HEALTHCARE | Age: 75
End: 2025-01-01
Attending: STUDENT IN AN ORGANIZED HEALTH CARE EDUCATION/TRAINING PROGRAM
Payer: MEDICARE

## 2025-01-01 ENCOUNTER — HOSPICE ADMISSION (OUTPATIENT)
Dept: HOME HOSPICE | Facility: HOSPICE | Age: 75
End: 2025-01-01
Payer: MEDICARE

## 2025-01-01 ENCOUNTER — RESULTS FOLLOW-UP (OUTPATIENT)
Dept: INTERNAL MEDICINE CLINIC | Facility: CLINIC | Age: 75
End: 2025-01-01

## 2025-01-01 VITALS
HEART RATE: 76 BPM | OXYGEN SATURATION: 93 % | DIASTOLIC BLOOD PRESSURE: 66 MMHG | RESPIRATION RATE: 18 BRPM | SYSTOLIC BLOOD PRESSURE: 130 MMHG

## 2025-01-01 VITALS — DIASTOLIC BLOOD PRESSURE: 72 MMHG | RESPIRATION RATE: 18 BRPM | SYSTOLIC BLOOD PRESSURE: 140 MMHG | HEART RATE: 78 BPM

## 2025-01-01 VITALS — RESPIRATION RATE: 18 BRPM | HEART RATE: 80 BPM

## 2025-01-01 VITALS — RESPIRATION RATE: 18 BRPM

## 2025-01-01 DIAGNOSIS — R06.00 DYSPNEA: ICD-10-CM

## 2025-01-01 DIAGNOSIS — Z51.5 HOSPICE CARE PATIENT: ICD-10-CM

## 2025-01-01 DIAGNOSIS — R52 PAIN: Primary | ICD-10-CM

## 2025-01-01 PROCEDURE — 10330057 MEDICATION, GENERAL

## 2025-01-01 PROCEDURE — G0156 HHCP-SVS OF AIDE,EA 15 MIN: HCPCS

## 2025-01-01 PROCEDURE — G0155 HHCP-SVS OF CSW,EA 15 MIN: HCPCS

## 2025-01-01 PROCEDURE — G0299 HHS/HOSPICE OF RN EA 15 MIN: HCPCS

## 2025-01-01 PROCEDURE — 10330087 HSPC SERVICE INTENSITY ADD-ON

## 2025-01-01 PROCEDURE — G0300 HHS/HOSPICE OF LPN EA 15 MIN: HCPCS

## 2025-01-01 RX ORDER — OXYCODONE HCL 5 MG/5 ML
SOLUTION, ORAL ORAL
Qty: 30 ML | Refills: 0 | Status: SHIPPED | OUTPATIENT
Start: 2025-01-01 | End: 2025-08-08

## 2025-01-01 RX ADMIN — HALOPERIDOL 2 MG: 2 SOLUTION ORAL at 08:58

## 2025-01-02 DIAGNOSIS — E78.2 MIXED HYPERLIPIDEMIA: ICD-10-CM

## 2025-01-02 NOTE — TELEPHONE ENCOUNTER
Medication: Vascepa     Dose/Frequency: Take 2 capsules (2 g total) by mouth 2 (two) times a day     Quantity: 90    Pharmacy: [facility]    Office:   [] PCP/Provider -   [x] Speciality/Provider - Dr. Martinez    Does the patient have enough for 3 days?   [] Yes   [x] No - Send as HP to POD

## 2025-01-03 ENCOUNTER — CLINICAL SUPPORT (OUTPATIENT)
Dept: DERMATOLOGY | Facility: CLINIC | Age: 75
End: 2025-01-03

## 2025-01-03 DIAGNOSIS — Z48.02 ENCOUNTER FOR REMOVAL OF SUTURES: Primary | ICD-10-CM

## 2025-01-03 PROCEDURE — 99024 POSTOP FOLLOW-UP VISIT: CPT | Performed by: DERMATOLOGY

## 2025-01-03 NOTE — PROGRESS NOTES
"Suture removal    Date/Time: 1/3/2025 10:30 AM    Performed by: Amada Baez RN  Authorized by: Shamar Simental MD  Universal Protocol:  procedure performed by consultantConsent: Verbal consent obtained. Written consent not obtained.  Risks and benefits: risks, benefits and alternatives were discussed  Consent given by: patient  Time out: Immediately prior to procedure a \"time out\" was called to verify the correct patient, procedure, equipment, support staff and site/side marked as required.  Timeout called at: 1/3/2025 10:24 AM.  Patient understanding: patient states understanding of the procedure being performed  Patient consent: the patient's understanding of the procedure matches consent given  Procedure consent: procedure consent matches procedure scheduled  Relevant documents: relevant documents not present or verified  Test results: test results not available  Site marked: the operative site was not marked  Radiology Images displayed and confirmed. If images not available, report reviewed: imaging studies not available  Patient identity confirmed: verbally with patient      Patient location:  Clinic  Location:     Laterality:  Right    Location:  Lower extremity    Lower extremity location:  Leg    Leg location:  R lower leg (medial)  Procedure details:     Tools used:  Suture removal kit    Wound appearance:  Clean, good wound healing, no sign(s) of infection, moist and pink  Post-procedure details:     Post-removal:  Band-Aid applied (Vaseline applied)    Patient tolerance of procedure:  Tolerated well, no immediate complications       Before suture removal     After suture removal   "

## 2025-01-05 RX ORDER — ICOSAPENT ETHYL 1 G/1
2 CAPSULE ORAL 2 TIMES DAILY
Qty: 90 CAPSULE | Refills: 3 | Status: SHIPPED | OUTPATIENT
Start: 2025-01-05 | End: 2025-01-06 | Stop reason: SDUPTHER

## 2025-01-06 DIAGNOSIS — E78.2 MIXED HYPERLIPIDEMIA: ICD-10-CM

## 2025-01-06 RX ORDER — ICOSAPENT ETHYL 0.5 G/1
2 CAPSULE ORAL 2 TIMES DAILY
Qty: 180 CAPSULE | Refills: 3 | Status: SHIPPED | OUTPATIENT
Start: 2025-01-06 | End: 2025-01-06

## 2025-01-06 RX ORDER — ICOSAPENT ETHYL 0.5 G/1
2 CAPSULE ORAL 2 TIMES DAILY
Qty: 360 CAPSULE | Refills: 3 | Status: SHIPPED | OUTPATIENT
Start: 2025-01-06 | End: 2025-01-07 | Stop reason: ALTCHOICE

## 2025-01-07 ENCOUNTER — PATIENT MESSAGE (OUTPATIENT)
Dept: CARDIOLOGY CLINIC | Facility: CLINIC | Age: 75
End: 2025-01-07

## 2025-01-07 DIAGNOSIS — E78.2 MIXED HYPERLIPIDEMIA: ICD-10-CM

## 2025-01-07 DIAGNOSIS — E78.2 MIXED HYPERLIPIDEMIA: Primary | ICD-10-CM

## 2025-01-07 RX ORDER — ICOSAPENT ETHYL 1 G/1
2 CAPSULE ORAL 2 TIMES DAILY
Qty: 120 CAPSULE | Refills: 11 | Status: SHIPPED | OUTPATIENT
Start: 2025-01-07

## 2025-01-07 NOTE — PATIENT COMMUNICATION
Can a new prescription for Icosapent Ethyl 1G caps, 2 caps BID be sent to [facility] on file?    Script sent yesterday was for the 0.5G caps and is much more expensive for Trina.

## 2025-01-08 ENCOUNTER — OFFICE VISIT (OUTPATIENT)
Dept: INTERNAL MEDICINE CLINIC | Facility: CLINIC | Age: 75
End: 2025-01-08
Payer: MEDICARE

## 2025-01-08 VITALS
TEMPERATURE: 97.6 F | BODY MASS INDEX: 27.11 KG/M2 | OXYGEN SATURATION: 98 % | HEIGHT: 63 IN | WEIGHT: 153 LBS | DIASTOLIC BLOOD PRESSURE: 74 MMHG | HEART RATE: 54 BPM | RESPIRATION RATE: 14 BRPM | SYSTOLIC BLOOD PRESSURE: 128 MMHG

## 2025-01-08 DIAGNOSIS — Z48.02 VISIT FOR SUTURE REMOVAL: Primary | ICD-10-CM

## 2025-01-08 PROCEDURE — 99213 OFFICE O/P EST LOW 20 MIN: CPT

## 2025-01-08 RX ORDER — ICOSAPENT ETHYL 0.5 G/1
2 CAPSULE ORAL 2 TIMES DAILY
Qty: 360 CAPSULE | Refills: 3 | Status: SHIPPED | OUTPATIENT
Start: 2025-01-08

## 2025-01-08 NOTE — PROGRESS NOTES
Name: Trina Davis      : 1950      MRN: [id number]  Encounter Provider: Juan Piña MD  Encounter Date: 2025   Encounter department: [facility] INTERNAL MEDICINE [place]    Assessment & Plan  Visit for suture removal  S/p excision of right lower extremity SCC in situ  Results of biopsy show clear margins  Presents to clinic today due to feeling like sutures have not entirely dissolved  Incision site appears clean and healing well, one suture retained and removed   Continue follow up with dermatology            History of Present Illness     Patient underwent excision of SCC in situ of lower right extremity on .  She has had no postoperative complications but feels as thought the sutures are sticking up and not dissolved completely.  She has no bleeding or oozing at the incision site.  Patient presents today for evaluation of any possible sutures that were retained and could be removed.       Review of Systems   Constitutional:  Negative for chills and fever.   HENT:  Negative for ear pain and sore throat.    Eyes:  Negative for pain and visual disturbance.   Respiratory:  Negative for cough and shortness of breath.    Cardiovascular:  Negative for chest pain and palpitations.   Gastrointestinal:  Negative for abdominal pain and vomiting.   Genitourinary:  Negative for dysuria and hematuria.   Musculoskeletal:  Negative for arthralgias and back pain.   Skin:  Negative for color change and rash.   Neurological:  Negative for seizures and syncope.   All other systems reviewed and are negative.    Past Medical History:   Diagnosis Date    Actinic keratosis     Acute blood loss anemia 2024    Acute cystitis without hematuria 2023    Acute cystitis without hematuria 2023    Acute respiratory failure with hypoxia (HCC) 02/15/2024    Acute-on-chronic kidney injury  (HCC)     NIKOS (acute kidney injury) (HCC) 2020    Allergic     Allergic rhinitis     Ambulatory dysfunction  "10/22/2020    AMS (altered mental status) 07/14/2020    Anesthesia     pt reports \"had to use double lumen endo tube for hiatal hernia repair/so surgeon could get to where he needed to work\"    Arthritis     Aspiration into airway     At high risk for falls 07/17/2024    Michael esophagus 1993    Lot of stress with children    Basal cell carcinoma 2007    left cheek     BCC (basal cell carcinoma) 05/27/2021    Left Nasal tip    Breast discharge 06/19/2023    Breast pain 06/19/2023    Cancer (HCC)     squamous cell cancer on forhead    Cancer (HCC)     basal cell on nose     Cholelithiasis     Chronic kidney disease 2000, 2018    Stones, kidney disease stage 4    Chronic pain disorder     bilat feet and joint pain on occas    Colon polyp     Confusion 10/30/2023    Constipation     COPD (chronic obstructive pulmonary disease) (HCC)     COVID-19 08/2021    recovered at home/did receive monoclonal infusion    COVID-19 virus infection 08/16/2021    Dental bridge present     Depression     Diabetes mellitus (HCC)     Type 2    Diabetic neuropathy (HCC)     Difficulty walking 2017    Disease of thyroid gland     Dyspnea     Elevated liver enzymes 04/04/2023    Epigastric abdominal pain with severe diabetic gastroparesis, status post EGD/Botox injection, 5/14 05/17/2021    Facial abrasion, initial encounter 03/22/2023    Fall 03/22/2023    Family history of thyroid problem     Fatty liver     Fibromyalgia, primary     Fracture of orbital floor, blow-out, right, closed, with routine healing, subsequent encounter 03/22/2023    Fracture of orbital floor, right side, initial encounter for closed fracture (HCC) 03/22/2023    GERD (gastroesophageal reflux disease)     Heart burn     Hiatal hernia     History of cholecystectomy 10/27/2023    History of colon polyps 07/30/2021    History of kidney stones 09/29/2020    Hx of recurrent kidney stones    History of kidney stones 09/29/2020    Hx of recurrent kidney stones  Formatting " of this note might be different from the original. Hx of recurrent kidney stones  Last Assessment & Plan:  Formatting of this note might be different from the original. We will set her up for follow-up in 3 months with a KUB prior.  She knows to call if she has any kidney stone type pain in the meantime.    History of Nissen fundoplication 10/27/2023    History of pneumonia     History of repair of hiatal hernia 05/03/2020    Hyperlipidemia     Hyperplasia, parathyroid (HCC)     Hypertension     Hypertensive urgency 10/27/2023    Hyponatremia 04/26/2023    Hypotension 04/13/2022    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 01/06/2023    Kidney problem     Kidney stone     Left hip pain 10/05/2023    Leukocytosis 07/14/2020    Memory loss Julu2 2020    Motion sickness     Motion sickness     Multiple closed fractures of ribs of right side 03/22/2023    Nasal bones, closed fracture 03/22/2023    Neck pain     Obesity 1978    Obesity (BMI 30.0-34.9)     Olecranon bursitis of right elbow 07/29/2024    Other chest pain 09/13/2021    Other fatigue 09/21/2023    Palpitations 07/18/2023    Peripheral neuropathy     Pollen allergies     Preoperative clearance 06/27/2019    Reactive depression 07/17/2024    RLS (restless legs syndrome)     S/P foot surgery 07/20/2022    SCC (squamous cell carcinoma) 05/04/2021    left mid forehead    SCC (squamous cell carcinoma) 2023    chest    Seasonal allergies     Shingles 01 05 23    Sleep apnea     has inspire    Squamous cell skin cancer 2007    left cheek     Stroke (HCC)     Swollen ankles     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, left     great toe fusion    Transaminitis 06/03/2024    Urinary incontinence     Urinary tract infection 03/28/2022    Wears glasses      Past Surgical History:   Procedure Laterality Date    ABDOMINAL SURGERY  1997,2015,1972    Nissen x2 1972 tubal ligarion    ARTHROSCOPY KNEE Right     BREAST BIOPSY      stereotactic-benign    BREAST BIOPSY       stereo-benign    BREAST CYST EXCISION Bilateral     benign- done in [place]-neg    BREAST EXCISIONAL BIOPSY      unknown date-benign    BREAST EXCISIONAL BIOPSY      unknown date-benign    BREAST EXCISIONAL BIOPSY      unknown date-benign    BREAST EXCISIONAL BIOPSY      unknown age-benign    CARDIAC CATHETERIZATION      CATARACT EXTRACTION Right     CHOLECYSTECTOMY      COLONOSCOPY      EXAMINATION UNDER ANESTHESIA N/A 06/24/2021    Procedure: EXAM UNDER ANESTHESIA (EUA), DISE;  Surgeon: Gregory Maier MD;  Location: [facility];  Service: ENT    HERNIA REPAIR      hiatal    HIATAL HERNIA REPAIR      KNEE SURGERY Right     Torn maniscus lap surg    LIPOSUCTION      LYMPH NODE BIOPSY      MOHS SURGERY  05/20/2021    left mid forehead-Mickey    MOHS SURGERY Left 05/27/2021    Left nasal tip- mickey     AR LIGATION/BIOPSY TEMPORAL ARTERY Left 01/05/2023    Procedure: BIOPSY ARTERY TEMPORAL;  Surgeon: Alec Dennis DO;  Location: [facility];  Service: Vascular    AR OPEN IMPLANTATION CRANIAL NERVE BOOM & PULSE GEN N/A 11/10/2021    Procedure: INSERTION UPPER AIRWAY STIMULATOR, INSPIRE IMPLANT;  Surgeon: Gregory Maier MD;  Location: [facility];  Service: ENT    AR UNLISTED PROCEDURE FOOT/TOES Right 07/19/2022    Procedure: 1st metatarsal phalangeal joint fusion;  Surgeon: Dede Bonner DPM;  Location: [facility];  Service: Podiatry    REDUCTION MAMMAPLASTY Bilateral 2000    REDUCTION MAMMAPLASTY      SKIN BIOPSY      SKIN CANCER EXCISION  2007    squamous cell carcinoma     SKIN CANCER EXCISION  2007    basal cell carcinoma    SKIN CANCER EXCISION  2023    SCCIS chest    SQUAMOUS CELL CARCINOMA EXCISION      TOE SURGERY Right 08/04/2022    Right Great Toe Fusion    TONSILLECTOMY      TUBAL LIGATION      UPPER GASTROINTESTINAL ENDOSCOPY      US GUIDED BREAST BIOPSY RIGHT COMPLETE Right 07/18/2022     Family History   Problem Relation Age of Onset    Heart disease Mother     Depression Mother     Hypertension  Mother     COPD Mother     Hearing loss Mother     Anxiety disorder Mother     Heart disease Father     Lung cancer Father 70        Smoker     Cancer Father         brain    Alcohol abuse Father     Dementia Father     Hypertension Father     Thyroid disease Father     COPD Father     Arthritis Father     Brain cancer Father 74    Brain cancer Sister     Hypertension Sister     Diabetes Sister     Heart disease Sister     Thyroid disease Sister     Cancer Sister         Lympoma, lung    Lung cancer Sister 79    Anxiety disorder Sister     Migraines Sister     Hypertension Brother     Diabetes Brother     Cancer Brother         Throat    Dementia Brother     Stroke Brother     Hypertension Brother     Heart disease Brother     Diabetes Brother     Hypertension Brother     Allergies Brother     No Known Problems Son     No Known Problems Son     Brain cancer Paternal Aunt         unknown age    Breast cancer Neg Hx      Social History     Tobacco Use    Smoking status: Former     Current packs/day: 0.00     Average packs/day: 2.0 packs/day for 10.0 years (20.0 ttl pk-yrs)     Types: Cigarettes     Start date: 1966     Quit date: 1976     Years since quittin.0     Passive exposure: Past    Smokeless tobacco: Never    Tobacco comments:     Smoked 2 pack a day   Vaping Use    Vaping status: Never Used   Substance and Sexual Activity    Alcohol use: Yes     Comment: 1 or 2 a year    Drug use: No    Sexual activity: Not Currently     Partners: Male     Birth control/protection: Abstinence, Post-menopausal, Female Sterilization     Comment: defer     Current Outpatient Medications on File Prior to Visit   Medication Sig    acetaminophen (TYLENOL) 500 mg tablet Take 650 mg by mouth as needed    albuterol (Ventolin HFA) 90 mcg/act inhaler Inhale 2 puffs every 6 (six) hours as needed for wheezing    amLODIPine (NORVASC) 5 mg tablet Take 2 tablets (10 mg total) by mouth daily    aspirin (ECOTRIN LOW STRENGTH) 81  mg EC tablet Take 1 tablet (81 mg total) by mouth daily Do not start before October 31, 2023.    B-D ULTRAFINE III SHORT PEN 31G X 8 MM MISC     Calcium Citrate 250 MG TABS Take 2 tablets (500 mg total) by mouth in the morning    Coenzyme Q10 (CoQ10) 100 MG CAPS Take 100 mg by mouth in the morning Bed time    Cyanocobalamin (Vitamin B12) 1000 MCG TBCR Take 1,000 mcg by mouth once a week    escitalopram (LEXAPRO) 10 mg tablet Take 1 tablet (10 mg total) by mouth daily    gabapentin (NEURONTIN) 100 mg capsule Take 1 capsule (100 mg total) by mouth 2 (two) times a day    hydrALAZINE (APRESOLINE) 25 mg tablet Take 1 tablet (25 mg total) by mouth 2 (two) times a day    Icosapent Ethyl (Vascepa) 0.5 g CAPS Take 2 g by mouth 2 (two) times a day    Icosapent Ethyl 1 g CAPS Take 2 capsules (2 g total) by mouth 2 (two) times a day    insulin aspart (NovoLOG) 100 units/mL injection Inject under the skin 3 (three) times a day before meals 5units, 5units, 12units.    insulin detemir (Levemir FlexTouch) 100 Units/mL injection pen Inject 20 Units under the skin every evening    levothyroxine 100 mcg tablet TAKE 1 TABLET BY MOUTH EVERY DAY    methenamine hippurate (HIPREX) 1 g tablet Take 1 tablet (1 g total) by mouth 2 (two) times a day with meals    metoprolol tartrate (LOPRESSOR) 50 mg tablet Take 1 tablet (50 mg total) by mouth every 12 (twelve) hours    pantoprazole (PROTONIX) 40 mg tablet Take 1 tablet (40 mg total) by mouth 2 (two) times a day    pramipexole (MIRAPEX) 0.25 mg tablet Take 1 tablet (0.25 mg total) by mouth daily at bedtime    Probiotic Product (PROBIOTIC BLEND PO) Take by mouth    rosuvastatin (CRESTOR) 5 mg tablet Take 1 tablet (5 mg total) by mouth daily    sucralfate (CARAFATE) 1 g tablet TAKE 1 TABLET (1 G TOTAL) BY MOUTH 3 (THREE) TIMES A DAY WITH MEALS    torsemide (DEMADEX) 10 mg tablet TAKE 10 MG. DAILY EXCEPT MONDAY, WEDNESDAY, FRIDAY 20 MG.    Vitamin D, Cholecalciferol, 25 MCG (1000 UT) TABS Take  "2,000 Units by mouth in the morning     Allergies   Allergen Reactions    Ciprofloxacin Hives    Pollen Extract Allergic Rhinitis    Sulfamethoxazole-Trimethoprim Tongue Swelling     Immunization History   Administered Date(s) Administered    COVID-19 MODERNA VACC 0.5 ML IM 01/29/2021, 02/26/2021, 02/24/2022    COVID-19 Pfizer Vac BIVALENT Julio-sucrose 12 Yr+ IM 10/15/2022    COVID-19 Pfizer mRNA vacc PF julio-sucrose 12 yr and older (Comirnaty) 11/06/2023, 04/23/2024, 11/14/2024    INFLUENZA 10/17/2018, 09/30/2019, 10/01/2019, 10/15/2022, 08/26/2023    Influenza Split High Dose Preservative Free IM 10/17/2018, 10/01/2019, 10/08/2024    Influenza, Seasonal Vaccine, Quadrivalent, Adjuvanted, .5e 08/26/2023    Influenza, high dose seasonal 0.7 mL 09/29/2020, 10/12/2021    Pneumococcal Conjugate Vaccine 20-valent (Pcv20), Polysace 07/12/2022, 07/12/2022, 10/08/2024    Pneumococcal Polysaccharide PPV23 04/01/2021    Tdap 12/26/2018    Zoster Vaccine Recombinant 07/01/2023, 11/30/2023    influenza, trivalent, adjuvanted 09/29/2020, 10/12/2021     Objective   /74 (BP Location: Left arm, Patient Position: Sitting, Cuff Size: Adult)   Pulse (!) 54   Temp 97.6 °F (36.4 °C) (Tympanic)   Resp 14   Ht 5' 3\" (1.6 m)   Wt 69.4 kg (153 lb)   SpO2 98%   BMI 27.10 kg/m²     Physical Exam  Vitals and nursing note reviewed.   Constitutional:       General: She is not in acute distress.     Appearance: She is well-developed.   HENT:      Head: Normocephalic and atraumatic.   Eyes:      Conjunctiva/sclera: Conjunctivae normal.   Cardiovascular:      Rate and Rhythm: Normal rate and regular rhythm.      Heart sounds: No murmur heard.  Pulmonary:      Effort: Pulmonary effort is normal. No respiratory distress.      Breath sounds: Normal breath sounds.   Abdominal:      Palpations: Abdomen is soft.      Tenderness: There is no abdominal tenderness.   Musculoskeletal:         General: No swelling.      Cervical back: Neck " supple.   Skin:     General: Skin is warm and dry.      Capillary Refill: Capillary refill takes less than 2 seconds.      Comments: Well healing incision on right lower extremity  1 suture noted to be retained in middle of incision site   Neurological:      Mental Status: She is alert.   Psychiatric:         Mood and Affect: Mood normal.

## 2025-01-08 NOTE — ASSESSMENT & PLAN NOTE
S/p excision of right lower extremity SCC in situ  Results of biopsy show clear margins  Presents to clinic today due to feeling like sutures have not entirely dissolved  Incision site appears clean and healing well, one suture retained and removed   Continue follow up with dermatology

## 2025-01-10 DIAGNOSIS — I10 PRIMARY HYPERTENSION: ICD-10-CM

## 2025-01-10 RX ORDER — AMLODIPINE BESYLATE 5 MG/1
10 TABLET ORAL DAILY
Qty: 180 TABLET | Refills: 1 | Status: SHIPPED | OUTPATIENT
Start: 2025-01-10

## 2025-01-19 DIAGNOSIS — D62 ACUTE BLOOD LOSS ANEMIA: ICD-10-CM

## 2025-01-20 DIAGNOSIS — E53.8 B12 DEFICIENCY: Primary | Chronic | ICD-10-CM

## 2025-01-20 RX ORDER — PANTOPRAZOLE SODIUM 40 MG/1
40 TABLET, DELAYED RELEASE ORAL 2 TIMES DAILY
Qty: 60 TABLET | Refills: 5 | Status: SHIPPED | OUTPATIENT
Start: 2025-01-20

## 2025-01-23 ENCOUNTER — TELEPHONE (OUTPATIENT)
Age: 75
End: 2025-01-23

## 2025-01-23 DIAGNOSIS — B37.0 ORAL THRUSH: Primary | ICD-10-CM

## 2025-01-23 RX ORDER — NYSTATIN 100000 [USP'U]/ML
500000 SUSPENSION ORAL 4 TIMES DAILY
Qty: 140 ML | Refills: 0 | Status: SHIPPED | OUTPATIENT
Start: 2025-01-23 | End: 2025-01-30

## 2025-01-23 NOTE — TELEPHONE ENCOUNTER
Pt reporting she has a fungal infection in her mouth w/white coating.  She's had it before and was prescribed a mouth wash before.    Can she get a prescription sent to pharmacy for that.

## 2025-01-30 ENCOUNTER — OFFICE VISIT (OUTPATIENT)
Dept: INTERNAL MEDICINE CLINIC | Facility: CLINIC | Age: 75
End: 2025-01-30
Payer: MEDICARE

## 2025-01-30 ENCOUNTER — APPOINTMENT (OUTPATIENT)
Dept: LAB | Facility: AMBULARY SURGERY CENTER | Age: 75
End: 2025-01-30
Payer: MEDICARE

## 2025-01-30 VITALS
WEIGHT: 153 LBS | HEIGHT: 63 IN | SYSTOLIC BLOOD PRESSURE: 104 MMHG | OXYGEN SATURATION: 99 % | HEART RATE: 59 BPM | DIASTOLIC BLOOD PRESSURE: 52 MMHG | BODY MASS INDEX: 27.11 KG/M2 | TEMPERATURE: 97.9 F | RESPIRATION RATE: 14 BRPM

## 2025-01-30 DIAGNOSIS — E11.21 DIABETIC NEPHROPATHY ASSOCIATED WITH TYPE 2 DIABETES MELLITUS (HCC): ICD-10-CM

## 2025-01-30 DIAGNOSIS — E78.2 MIXED HYPERLIPIDEMIA: ICD-10-CM

## 2025-01-30 DIAGNOSIS — I12.0 BENIGN HYPERTENSIVE KIDNEY DISEASE WITH CHRONIC KIDNEY DISEASE STAGE V OR END STAGE RENAL DISEASE (HCC): ICD-10-CM

## 2025-01-30 DIAGNOSIS — N18.5 STAGE 5 CHRONIC KIDNEY DISEASE NOT ON CHRONIC DIALYSIS (HCC): ICD-10-CM

## 2025-01-30 DIAGNOSIS — E55.9 VITAMIN D DEFICIENCY: Chronic | ICD-10-CM

## 2025-01-30 DIAGNOSIS — R42 VERTIGO: Primary | ICD-10-CM

## 2025-01-30 DIAGNOSIS — E53.8 B12 DEFICIENCY: Chronic | ICD-10-CM

## 2025-01-30 DIAGNOSIS — K92.2 LOWER GI BLEED: ICD-10-CM

## 2025-01-30 DIAGNOSIS — N18.5 ANEMIA IN STAGE 5 CHRONIC KIDNEY DISEASE, NOT ON CHRONIC DIALYSIS  (HCC): Chronic | ICD-10-CM

## 2025-01-30 DIAGNOSIS — R60.0 EDEMA OF EXTREMITY: ICD-10-CM

## 2025-01-30 DIAGNOSIS — D63.1 ANEMIA IN STAGE 5 CHRONIC KIDNEY DISEASE, NOT ON CHRONIC DIALYSIS  (HCC): Chronic | ICD-10-CM

## 2025-01-30 DIAGNOSIS — N25.81 SECONDARY HYPERPARATHYROIDISM OF RENAL ORIGIN (HCC): Chronic | ICD-10-CM

## 2025-01-30 DIAGNOSIS — R42 VERTIGO: ICD-10-CM

## 2025-01-30 LAB
ALBUMIN SERPL BCG-MCNC: 3.8 G/DL (ref 3.5–5)
ALP SERPL-CCNC: 64 U/L (ref 34–104)
ALT SERPL W P-5'-P-CCNC: 74 U/L (ref 7–52)
ANION GAP SERPL CALCULATED.3IONS-SCNC: 13 MMOL/L (ref 4–13)
AST SERPL W P-5'-P-CCNC: 44 U/L (ref 13–39)
BASOPHILS # BLD AUTO: 0.03 THOUSANDS/ΜL (ref 0–0.1)
BASOPHILS NFR BLD AUTO: 0 % (ref 0–1)
BILIRUB SERPL-MCNC: 0.35 MG/DL (ref 0.2–1)
BUN SERPL-MCNC: 77 MG/DL (ref 5–25)
CALCIUM SERPL-MCNC: 9.1 MG/DL (ref 8.4–10.2)
CHLORIDE SERPL-SCNC: 102 MMOL/L (ref 96–108)
CO2 SERPL-SCNC: 22 MMOL/L (ref 21–32)
CREAT SERPL-MCNC: 3.75 MG/DL (ref 0.6–1.3)
CREAT UR-MCNC: 68.3 MG/DL
EOSINOPHIL # BLD AUTO: 0.35 THOUSAND/ΜL (ref 0–0.61)
EOSINOPHIL NFR BLD AUTO: 4 % (ref 0–6)
ERYTHROCYTE [DISTWIDTH] IN BLOOD BY AUTOMATED COUNT: 14.1 % (ref 11.6–15.1)
FERRITIN SERPL-MCNC: 28 NG/ML (ref 11–307)
GFR SERPL CREATININE-BSD FRML MDRD: 11 ML/MIN/1.73SQ M
GLUCOSE P FAST SERPL-MCNC: 152 MG/DL (ref 65–99)
HCT VFR BLD AUTO: 34 % (ref 34.8–46.1)
HGB BLD-MCNC: 11.6 G/DL (ref 11.5–15.4)
IMM GRANULOCYTES # BLD AUTO: 0.02 THOUSAND/UL (ref 0–0.2)
IMM GRANULOCYTES NFR BLD AUTO: 0 % (ref 0–2)
IRON SATN MFR SERPL: 27 % (ref 15–50)
IRON SERPL-MCNC: 107 UG/DL (ref 50–212)
LYMPHOCYTES # BLD AUTO: 1.98 THOUSANDS/ΜL (ref 0.6–4.47)
LYMPHOCYTES NFR BLD AUTO: 25 % (ref 14–44)
MAGNESIUM SERPL-MCNC: 2 MG/DL (ref 1.9–2.7)
MCH RBC QN AUTO: 30.4 PG (ref 26.8–34.3)
MCHC RBC AUTO-ENTMCNC: 34.1 G/DL (ref 31.4–37.4)
MCV RBC AUTO: 89 FL (ref 82–98)
MONOCYTES # BLD AUTO: 0.44 THOUSAND/ΜL (ref 0.17–1.22)
MONOCYTES NFR BLD AUTO: 6 % (ref 4–12)
NEUTROPHILS # BLD AUTO: 5.08 THOUSANDS/ΜL (ref 1.85–7.62)
NEUTS SEG NFR BLD AUTO: 65 % (ref 43–75)
NRBC BLD AUTO-RTO: 0 /100 WBCS
PHOSPHATE SERPL-MCNC: 4.6 MG/DL (ref 2.3–4.1)
PLATELET # BLD AUTO: 231 THOUSANDS/UL (ref 149–390)
PMV BLD AUTO: 11.6 FL (ref 8.9–12.7)
POTASSIUM SERPL-SCNC: 3.3 MMOL/L (ref 3.5–5.3)
PROT SERPL-MCNC: 6 G/DL (ref 6.4–8.4)
PROT UR-MCNC: 195.3 MG/DL
PROT/CREAT UR: 2.9 MG/G{CREAT} (ref 0–0.1)
PTH-INTACT SERPL-MCNC: 88.3 PG/ML (ref 12–88)
RBC # BLD AUTO: 3.81 MILLION/UL (ref 3.81–5.12)
SODIUM SERPL-SCNC: 137 MMOL/L (ref 135–147)
TIBC SERPL-MCNC: 392 UG/DL (ref 250–450)
TRANSFERRIN SERPL-MCNC: 280 MG/DL (ref 203–362)
UIBC SERPL-MCNC: 285 UG/DL (ref 155–355)
VIT B12 SERPL-MCNC: 714 PG/ML (ref 180–914)
WBC # BLD AUTO: 7.9 THOUSAND/UL (ref 4.31–10.16)

## 2025-01-30 PROCEDURE — 82607 VITAMIN B-12: CPT

## 2025-01-30 PROCEDURE — 36415 COLL VENOUS BLD VENIPUNCTURE: CPT

## 2025-01-30 PROCEDURE — 84156 ASSAY OF PROTEIN URINE: CPT

## 2025-01-30 PROCEDURE — 85025 COMPLETE CBC W/AUTO DIFF WBC: CPT

## 2025-01-30 PROCEDURE — 83970 ASSAY OF PARATHORMONE: CPT

## 2025-01-30 PROCEDURE — 82570 ASSAY OF URINE CREATININE: CPT

## 2025-01-30 PROCEDURE — 83550 IRON BINDING TEST: CPT

## 2025-01-30 PROCEDURE — 84100 ASSAY OF PHOSPHORUS: CPT

## 2025-01-30 PROCEDURE — 83735 ASSAY OF MAGNESIUM: CPT

## 2025-01-30 PROCEDURE — 82728 ASSAY OF FERRITIN: CPT

## 2025-01-30 PROCEDURE — G2211 COMPLEX E/M VISIT ADD ON: HCPCS

## 2025-01-30 PROCEDURE — 99213 OFFICE O/P EST LOW 20 MIN: CPT

## 2025-01-30 PROCEDURE — 83540 ASSAY OF IRON: CPT

## 2025-01-30 PROCEDURE — 80053 COMPREHEN METABOLIC PANEL: CPT

## 2025-01-30 RX ORDER — MECLIZINE HCL 12.5 MG 12.5 MG/1
12.5 TABLET ORAL 2 TIMES DAILY
Qty: 6 TABLET | Refills: 0 | Status: SHIPPED | OUTPATIENT
Start: 2025-01-30

## 2025-01-30 NOTE — PROGRESS NOTES
"Name: Trina Davis      : 1950      MRN: [id number]  Encounter Provider: Francisco Pineda MD  Encounter Date: 2025   Encounter department: [facility] INTERNAL MEDICINE [place]  :  Assessment & Plan  Vertigo  Post viral positional vertigo vs Anemia 2/2 GI or kidney function vs uremia vs autonomic dysfunction    PLAN  CBC, CMP  Pt can hold metoprolol if SBP<110  Meclizine 12.5 BID for 3 days    Orders:    CBC and differential; Future    Comprehensive metabolic panel; Future    meclizine (ANTIVERT) 12.5 MG tablet; Take 1 tablet (12.5 mg total) by mouth 2 (two) times a day           History of Present Illness   75 yo F presented to the clinic d/t dizziness.     Pt has 2 wk hx of lightheaded when standing. Last few days the symptoms happen when moving head, either side to side or when rolling over in the bed. Pt describes the sxs as both light headed and room spinning. Pt denies syncope and fall   Pt notes cold which is similar to when she needed blood transfusion.     Pt also notes BP is low.  Normal  and w/o meds 190. Today she is 104/52.     Pt denies any changes in medication.       Review of Systems   Constitutional:  Positive for chills.   Skin:  Positive for pallor.   Neurological:  Positive for dizziness and light-headedness.       Objective   /52 (BP Location: Left arm, Patient Position: Sitting, Cuff Size: Adult)   Pulse 59   Temp 97.9 °F (36.6 °C) (Tympanic)   Resp 14   Ht 5' 3\" (1.6 m)   Wt 69.4 kg (153 lb)   SpO2 99%   BMI 27.10 kg/m²      Physical Exam  Constitutional:       General: She is not in acute distress.     Appearance: She is not toxic-appearing.   HENT:      Nose: Nose normal.      Mouth/Throat:      Mouth: Mucous membranes are moist.      Pharynx: Oropharynx is clear.   Eyes:      Comments: Pal conjunctivae   Cardiovascular:      Rate and Rhythm: Regular rhythm. Bradycardia present.      Pulses: Normal pulses.   Pulmonary:      Effort: Pulmonary effort " is normal. No respiratory distress.      Breath sounds: No stridor. No wheezing, rhonchi or rales.   Musculoskeletal:      Cervical back: Normal range of motion. No rigidity or tenderness.   Lymphadenopathy:      Cervical: No cervical adenopathy.   Skin:     Capillary Refill: Capillary refill takes 2 to 3 seconds.   Neurological:      Mental Status: She is alert and oriented to person, place, and time.

## 2025-01-30 NOTE — ASSESSMENT & PLAN NOTE
Post viral positional vertigo vs Anemia 2/2 GI or kidney function vs uremia vs autonomic dysfunction    PLAN  CBC, CMP  Pt can hold metoprolol if SBP<110  Meclizine 12.5 BID for 3 days    Orders:    CBC and differential; Future    Comprehensive metabolic panel; Future    meclizine (ANTIVERT) 12.5 MG tablet; Take 1 tablet (12.5 mg total) by mouth 2 (two) times a day

## 2025-01-31 ENCOUNTER — RESULTS FOLLOW-UP (OUTPATIENT)
Dept: NEPHROLOGY | Facility: CLINIC | Age: 75
End: 2025-01-31

## 2025-01-31 DIAGNOSIS — E87.6 HYPOKALEMIA: Primary | ICD-10-CM

## 2025-01-31 DIAGNOSIS — N18.5 STAGE 5 CHRONIC KIDNEY DISEASE NOT ON CHRONIC DIALYSIS (HCC): Primary | ICD-10-CM

## 2025-01-31 DIAGNOSIS — I10 PRIMARY HYPERTENSION: ICD-10-CM

## 2025-01-31 RX ORDER — SPIRONOLACTONE 25 MG/1
25 TABLET ORAL DAILY
Qty: 30 TABLET | Refills: 5 | Status: CANCELLED | OUTPATIENT
Start: 2025-01-31

## 2025-01-31 RX ORDER — AMILORIDE HYDROCHLORIDE 5 MG/1
5 TABLET ORAL DAILY
Qty: 30 TABLET | Refills: 5 | Status: SHIPPED | OUTPATIENT
Start: 2025-01-31

## 2025-01-31 RX ORDER — AMILORIDE HYDROCHLORIDE 5 MG/1
5 TABLET ORAL DAILY
Qty: 30 TABLET | Refills: 5 | Status: CANCELLED | OUTPATIENT
Start: 2025-01-31

## 2025-01-31 NOTE — TELEPHONE ENCOUNTER
----- Message from Donaldo Baires MD sent at 1/31/2025  7:55 AM EST -----  Potassium is low I would recommend spironolactone 25 mg daily  Recheck a CMP 1 to 2 weeks after the adjustment  Then have her bring in a week of blood pressure readings prior to her appointment thank you  ----- Message -----  From: Lab, Background User  Sent: 1/30/2025   3:06 PM EST  To: Donaldo Baires MD

## 2025-01-31 NOTE — TELEPHONE ENCOUNTER
Spoke with patient about the following, she expressed understanding and thanked us for the call back:    Lets go a different way lets  put her on amiloride 5 mg daily thank so much

## 2025-01-31 NOTE — TELEPHONE ENCOUNTER
Patient called stating she was on spironolactone twice in the past and it was discontinued but she doesn't remember why. I asked patient if she had a reaction to it but she said she really doesn't know. Asking if she should still start it and states she will need a new rx if so. Please advise, thank you.

## 2025-02-06 DIAGNOSIS — E78.2 MIXED HYPERLIPIDEMIA: ICD-10-CM

## 2025-02-06 RX ORDER — ICOSAPENT ETHYL 1 G/1
2 CAPSULE ORAL 2 TIMES DAILY
Qty: 120 CAPSULE | Refills: 0 | Status: CANCELLED | OUTPATIENT
Start: 2025-02-06

## 2025-02-07 ENCOUNTER — APPOINTMENT (OUTPATIENT)
Dept: LAB | Facility: AMBULARY SURGERY CENTER | Age: 75
End: 2025-02-07
Payer: MEDICARE

## 2025-02-07 DIAGNOSIS — N18.5 STAGE 5 CHRONIC KIDNEY DISEASE NOT ON CHRONIC DIALYSIS (HCC): ICD-10-CM

## 2025-02-07 LAB
ALBUMIN SERPL BCG-MCNC: 3.9 G/DL (ref 3.5–5)
ALP SERPL-CCNC: 73 U/L (ref 34–104)
ALT SERPL W P-5'-P-CCNC: 42 U/L (ref 7–52)
ANION GAP SERPL CALCULATED.3IONS-SCNC: 8 MMOL/L (ref 4–13)
AST SERPL W P-5'-P-CCNC: 20 U/L (ref 13–39)
BILIRUB SERPL-MCNC: 0.32 MG/DL (ref 0.2–1)
BUN SERPL-MCNC: 62 MG/DL (ref 5–25)
CALCIUM SERPL-MCNC: 9.4 MG/DL (ref 8.4–10.2)
CHLORIDE SERPL-SCNC: 102 MMOL/L (ref 96–108)
CO2 SERPL-SCNC: 27 MMOL/L (ref 21–32)
CREAT SERPL-MCNC: 3.97 MG/DL (ref 0.6–1.3)
GFR SERPL CREATININE-BSD FRML MDRD: 10 ML/MIN/1.73SQ M
GLUCOSE P FAST SERPL-MCNC: 128 MG/DL (ref 65–99)
POTASSIUM SERPL-SCNC: 4.7 MMOL/L (ref 3.5–5.3)
PROT SERPL-MCNC: 6.3 G/DL (ref 6.4–8.4)
SODIUM SERPL-SCNC: 137 MMOL/L (ref 135–147)

## 2025-02-07 PROCEDURE — 80053 COMPREHEN METABOLIC PANEL: CPT

## 2025-02-07 PROCEDURE — 36415 COLL VENOUS BLD VENIPUNCTURE: CPT

## 2025-02-10 ENCOUNTER — TELEPHONE (OUTPATIENT)
Age: 75
End: 2025-02-10

## 2025-02-10 DIAGNOSIS — E11.21 DIABETIC NEPHROPATHY ASSOCIATED WITH TYPE 2 DIABETES MELLITUS (HCC): ICD-10-CM

## 2025-02-10 DIAGNOSIS — I12.9 BENIGN HYPERTENSIVE KIDNEY DISEASE WITH CHRONIC KIDNEY DISEASE STAGE I THROUGH STAGE IV, OR UNSPECIFIED: ICD-10-CM

## 2025-02-10 DIAGNOSIS — I10 PRIMARY HYPERTENSION: ICD-10-CM

## 2025-02-10 DIAGNOSIS — E11.22 TYPE 2 DIABETES MELLITUS WITH STAGE 4 CHRONIC KIDNEY DISEASE, WITH LONG-TERM CURRENT USE OF INSULIN (HCC): ICD-10-CM

## 2025-02-10 DIAGNOSIS — N18.4 TYPE 2 DIABETES MELLITUS WITH STAGE 4 CHRONIC KIDNEY DISEASE, WITH LONG-TERM CURRENT USE OF INSULIN (HCC): ICD-10-CM

## 2025-02-10 DIAGNOSIS — D63.1 ANEMIA IN STAGE 5 CHRONIC KIDNEY DISEASE, NOT ON CHRONIC DIALYSIS  (HCC): Chronic | ICD-10-CM

## 2025-02-10 DIAGNOSIS — Z79.4 TYPE 2 DIABETES MELLITUS WITH STAGE 4 CHRONIC KIDNEY DISEASE, WITH LONG-TERM CURRENT USE OF INSULIN (HCC): ICD-10-CM

## 2025-02-10 DIAGNOSIS — N25.81 SECONDARY HYPERPARATHYROIDISM OF RENAL ORIGIN (HCC): Primary | Chronic | ICD-10-CM

## 2025-02-10 DIAGNOSIS — N18.5 ANEMIA IN STAGE 5 CHRONIC KIDNEY DISEASE, NOT ON CHRONIC DIALYSIS  (HCC): Chronic | ICD-10-CM

## 2025-02-10 RX ORDER — ICOSAPENT ETHYL 0.5 G/1
2 CAPSULE ORAL 2 TIMES DAILY
Qty: 180 CAPSULE | Refills: 3 | Status: SHIPPED | OUTPATIENT
Start: 2025-02-10 | End: 2025-02-11 | Stop reason: SDUPTHER

## 2025-02-10 NOTE — ASSESSMENT & PLAN NOTE
Progressing  Current status progression may be related to medications or just ongoing progression of diabetic kidney disease  Will adjust medications recheck labs  Patient now is interested in some form of renal placement therapy will review peritoneal dialysis.  Monitor uremic symptoms closely if they persist or progress to consider initiation of renal placement therapy I will reassess this after patient has had a chance to review the modalities in the next few weeks

## 2025-02-10 NOTE — PROGRESS NOTES
Name: Trina Davis      : 1950      MRN: [id number]  Encounter Provider: Donaldo Baires MD  Encounter Date: 2025   Encounter department: [facility] [place]  :  Assessment & Plan  Stage 5 chronic kidney disease not on chronic dialysis (HCC)  Progressing  Current status progression may be related to medications or just ongoing progression of diabetic kidney disease  Will adjust medications recheck labs  Patient now is interested in some form of renal placement therapy will review peritoneal dialysis.  Monitor uremic symptoms closely if they persist or progress to consider initiation of renal placement therapy I will reassess this after patient has had a chance to review the modalities in the next few weeks  Benign hypertensive kidney disease with chronic kidney disease stage V or end stage renal disease (HCC)  Low normal today, although slightly high at home there is some orthostasis  Will make changes stopping amiloride and adjusting torsemide  Recheck blood pressures in a few weeks she will also bring in her blood pressure device to reassess    Orders:    Basic metabolic panel; Future    Basic metabolic panel; Future    CBC; Future    Phosphorus; Future    Magnesium; Future    Anemia in stage 5 chronic kidney disease, not on chronic dialysis  (HCC)  Hemoglobin stable, low iron studies replete iron over-the-counter 3 days a week    Orders:    Basic metabolic panel; Future    Basic metabolic panel; Future    CBC; Future    Phosphorus; Future    Magnesium; Future    Secondary hyperparathyroidism of renal origin (HCC)  Acceptable level monitor do not overtreat  Orders:    Basic metabolic panel; Future    Basic metabolic panel; Future    CBC; Future    Phosphorus; Future    Magnesium; Future    Type 2 diabetes mellitus with stage 5 chronic kidney disease not on chronic dialysis, with long-term current use of insulin (HCC)  Not SGLT2 inhibitor candidate given frequent urinary tract  infections  Orders:    Basic metabolic panel; Future    Basic metabolic panel; Future    CBC; Future    Phosphorus; Future    Magnesium; Future    Vitamin D deficiency  At goal continue to monitor  Orders:    Basic metabolic panel; Future    Basic metabolic panel; Future    CBC; Future    Phosphorus; Future    Magnesium; Future    Mixed hyperlipidemia  At goal continue to monitor  Orders:    Basic metabolic panel; Future    Basic metabolic panel; Future    CBC; Future    Phosphorus; Future    Magnesium; Future              41 minutes were spent face to face with patient with greater than 50% of the time spent in counseling or coordination of care including discussions of findings and workup and treatment instructions.  All of the patient's questions were answered to their satisfaction during this discussion.  I advised the patient to call if there is any further questions or concerns.          History of Present Illness   HPI  Trina Davis is a 74 y.o. female who presents follow-up regarding CKD 5  There has been no hospitalizations or acute illnesses since last visit.  The patient complaining of more fatigue, fair appetite but has lost weight  More sleepy through the day  Feels a little unsteady when she ambulates  Some chills but no temperature no cough except GERD last p.m. may have contributed to mild cough  No hematuria, dysuria, voiding symptoms or foamy urine although does not feel she is urinating as much as usual  On and off nausea no vomiting some loose stools since recent GI bleeding but improving  No chest pain, some mild dyspnea on exertion slightly worse than usual but has been present, no leg swelling  Occasional headaches, some dizziness or lightheadedness but intermingled with  unsteadiness  Blood pressure medications:  Amiloride 5 mg daily in the morning but just switched to 3 times a week as of 2/10/2025  Amlodipine 10 mg daily  Hydralazine 25 mg twice a day  Metoprolol tartrate 50 mg twice a  day  Torsemide 10 mg daily extremity Wednesday and Friday 20 mg    Renal pertinent medications:  Vascepa 1000 mg twice a day  Rosuvastatin 5 mg daily  Baby aspirin daily  Gabapentin 100 mg twice a day only as needed  Pantoprazole 40 mg twice a day  Vitamin D 2000 units daily      Review of Systems  Please see HPI, otherwise the review of systems as completely reviewed with the patient are negative    Pertinent Medical History   1.  CKD stage 5.:  Etiology:  Diabetic nephropathy/hypertensive nephrosclerosis/arteriolar nephrosclerosis/chronic NSAID use.  No evidence of obstructive uropathy, renal artery disease or primary glomerular disease with a bland urinalysis  Of note, CPK was normal at 105 no evidence rhabdomyolysis.  Baseline creatinine: Most recently 2.7-3.5  Current creatinine 3.97 higher than has been probable progression from 2/7/2025  Urine protein creatinine ratio: 2.9 g higher once again  UA demonstrated no significant hematuria but 3+ proteinuria from  Serologies:  Normal C3/C4; negative rheumatoid factor; negative SPEP:  Negative UPEP;  Light chain ratio 2.09 essentially normal for chronic kidney disease  negative ELOY; negative hepatitis-C; normal IgA; normal ASO; negative RPR     Renal ultrasound no hydronephrosis small bilateral renal simple cyst left upper pole nonobstructing calculi okay        Recommendations:  Treat hypertension-please see below  Treat dyslipidemia-please see below  Maintain proteinuria less than 1 g or as low as possible: Adjust antihypertensive regimen in order to try to get to proteinuria less than 1 g please see below  Avoid nephrotoxic agents such as NSAIDs, patient counseled as such  S/p kidney smart  ACP meeting completed actually no dialysis however now she is considering it we will have her review PD.  If she feels no better after some changes on her regimen to consider initiation of PD versus in center hemodialysis  Follow labs closely after adjustments     2.  Volume:    Current status:  Euvolemic   Torsemide dose: Currently on torsemide 10 milligrams daily from 3 days a week 20 mg she seems possibly mildly prerenal no evidence of volume overload     3.  Hypertension:  Workup:  saline suppression test for primary aldosterone state was normal  plasma free metanephrines was negative  renal artery duplex was negative 02/2019       Current blood pressure averages:   AM: 155/74 standing, 141/71  PM: 147/67, standing 127/66  Heart rate: 50-60 range        Goal blood pressure:  less than 125/75 given less than 1 g of proteinuria at this time  Recommendations:  Push nonmedical regimen including weight loss, isotonic exercise and avoidance of salt; patient counseled as such  Medication changes today:   Decrease torsemide  Stop amiloride  Recheck blood pressures in the next few weeks and she will bring in her device in the next few weeks     4.  Electrolytes:   Mild metabolic acidosis from renal disease doing well at 27  Hyperkalemia now better at 4.7 stop amiloride at this time given slightly high potassium and increasing creatinine     5.  Mineral bone disorder:  Calcium/magnesium/phosphorus:  All acceptable,   PTH intact: 88.3 from 1/30/2025 although slightly elevated doing well do not overtreat  Vitamin-D: 30.1 from 4/1/2024 at goal continue to monitor     6.  Dyslipidemia:  Goal LDL:  Less than 100  Current lipid profile: LDL 25/HDL 34/triglycerides 331 from 4/8/24  Recommendations:  Per Endocrinology but essentially at goal        7.  Anemia:   Current hemoglobin 11.6 stablel after recent GI bleeding from 1/30/2025  Followed closely by GI  Studies from 1/30/2025: Saturation 27% ferritin only 28 recommend oral iron 3 days a week      8.  Other problems:  Depression  Diabetes mellitus per primary medical physician: I would avoid SGLT2 inhibitors at the moment with frequent urinary tract infections  Hypothyroidism  SHAYAN on CPAP  Fibromyalgia/osteoarthritis:  Patient with myoclonic  movements, seems related to gabapentin it was adjusted with resolution.  Nephrolithiasis  Basal cell/squamous CA of the skin  Urinary stress incontinence  Status post incisional hernia repairs  hospitalization as outlined below from severe GERD with hypoxemia and shortness of breath from a failed Nissen fundoplication from prior hiatal hernia repair.  Patient is due to have further testing with an EGD by GI and then subsequently thoracic surgery consultation for possible repair(however, according to the patient at this juncture she was felt I risk so no surgery is planned at this time)  In addition, she was placed on Protonix 40 b.i.d. and Pepcid 40 mg hs  Hospitalization on 07/14/2020 secondary confusion at home.  Apparently she had protracted hospital course in [place] following aspiration pneumonia.  Ten days in the ICU on a ventilator.  No overt infectious process.  Low probability for pulmonary embolus despite an elevated D-dimer.  Had mild leukocytosis/SIRS criteria subsequently discharged 1 day later when she clinically improved.  Symptoms were felt secondary to her protracted ICU course.   hospitalization with discharge on 05/18/21:  Epigastric pain following an EGD on 05/13/2021 for Botox injection at the pylorus for treatment for gastroparesis.  Apparently associated with chocolate ingestion and possible cough with aspiration  Complicated by NIKOS with creatinine to 2.6 which improved with IV fluids TO 1.92 ON 05/18/2021   Hospitalization 04/13/2022:  With hypotension fatigue noted to have a low blood pressure mild increasing creatinine, dysuria treated with Bactrim but had an allergic reaction to it as an outpatient she received intravenous ceftriaxone and she was discharged on doxycycline.  Recently placed on spironolactone for blood pressure all medications were held decide from metoprolol.  Amlodipine was resumed, torsemide re-initiated, but spironolactone was stopped.  Maintain off hydralazine  possibly add olmesartan  Creatinine initially as high as 2.27 reduce down to 2.16 upon discharge  Patient admitted 1/7/2023 secondary headache and confusion left temporal symptoms and temporal artery tenderness with an elevated ESR started empirically on steroids per neurology recommendation status post temporal artery biopsy.  However she developed a vesicular rash felt to have herpes zoster ophthalmologists started on Valtrex seen by ophthalmology eyedrops also initiated.  Patient discharged on Valtrex and high-dose gabapentin with follow-up with her primary care physician.  Patient with leukocytosis most compatible with steroids  Admitted 8/19/2023 after recent UTI history of urinary retention and straight catheterizes herself: Admitted with NIKOS from poor oral intake UTI with Serratia given IV fluids placed on ertapenem/change in mental status noted.  Urinary straight catheterizations recommended to be increased by urology.  Torsemide 5 mg every other day.  Admitted 9/9 with change in mental status status post recent discharge earlier in September with UTI.  Seen by geriatrics recommended formal cognitive testing as an outpatient.  ([place] multifactorial from chronic medical conditions?  Underlying dementia): Patient's course complicated by acute kidney injury with a creatinine of 2.5 on admission diuretics were held gentle IV fluids creatinine back to baseline 2.07  Admitted 9/12/2023: COPD exacerbation treated with steroids nebulizers and oxygen.     Hospitalized 10/1/2024: With hematochezia underwent colonoscopy required clipping of Dieulafoy lesion at that time.  Subsequent colonoscopy 9/26 had 2 tubular adenomas removed as well as additional clipping to the lesion is noted.  Subsequent repeat Ace colonoscopy 9/3... Subsequently 2 more clips were placed reinforced the bleeding in the ascending colon and hemoglobin remained stable.     GI health maintenance: Please see above 9/30/2024  Medical History Reviewed  "by provider this encounter:  Allergies  Meds     .  Past Medical History   Past Medical History:   Diagnosis Date    Actinic keratosis     Acute blood loss anemia 09/24/2024    Acute cystitis without hematuria 04/28/2023    Acute cystitis without hematuria 04/28/2023    Acute respiratory failure with hypoxia (HCC) 02/15/2024    Acute-on-chronic kidney injury  (HCC)     NIKOS (acute kidney injury) (HCC) 05/08/2020    Allergic     Allergic rhinitis     Ambulatory dysfunction 10/22/2020    AMS (altered mental status) 07/14/2020    Anesthesia     pt reports \"had to use double lumen endo tube for hiatal hernia repair/so surgeon could get to where he needed to work\"    Arthritis     Aspiration into airway     At high risk for falls 07/17/2024    Michael esophagus 1993    Lot of stress with children    Basal cell carcinoma 2007    left cheek     BCC (basal cell carcinoma) 05/27/2021    Left Nasal tip    Breast discharge 06/19/2023    Breast pain 06/19/2023    Cancer (HCC)     squamous cell cancer on forhead    Cancer (HCC)     basal cell on nose     Cholelithiasis     Chronic kidney disease 2000, 2018    Stones, kidney disease stage 4    Chronic pain disorder     bilat feet and joint pain on occas    Colon polyp     Confusion 10/30/2023    Constipation     COPD (chronic obstructive pulmonary disease) (HCC)     COVID-19 08/2021    recovered at home/did receive monoclonal infusion    COVID-19 virus infection 08/16/2021    Dental bridge present     Depression     Diabetes mellitus (HCC)     Type 2    Diabetic neuropathy (HCC)     Difficulty walking 2017    Disease of thyroid gland     Dyspnea     Elevated liver enzymes 04/04/2023    Epigastric abdominal pain with severe diabetic gastroparesis, status post EGD/Botox injection, 5/14 05/17/2021    Facial abrasion, initial encounter 03/22/2023    Fall 03/22/2023    Family history of thyroid problem     Fatty liver     Fibromyalgia, primary     Fracture of orbital floor, blow-out, " right, closed, with routine healing, subsequent encounter 03/22/2023    Fracture of orbital floor, right side, initial encounter for closed fracture (HCC) 03/22/2023    GERD (gastroesophageal reflux disease)     Heart burn     Hiatal hernia     History of cholecystectomy 10/27/2023    History of colon polyps 07/30/2021    History of kidney stones 09/29/2020    Hx of recurrent kidney stones    History of kidney stones 09/29/2020    Hx of recurrent kidney stones  Formatting of this note might be different from the original. Hx of recurrent kidney stones  Last Assessment & Plan:  Formatting of this note might be different from the original. We will set her up for follow-up in 3 months with a KUB prior.  She knows to call if she has any kidney stone type pain in the meantime.    History of Nissen fundoplication 10/27/2023    History of pneumonia     History of repair of hiatal hernia 05/03/2020    Hyperlipidemia     Hyperplasia, parathyroid (HCC)     Hypertension     Hypertensive urgency 10/27/2023    Hyponatremia 04/26/2023    Hypotension 04/13/2022    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 01/06/2023    Kidney problem     Kidney stone     Left hip pain 10/05/2023    Leukocytosis 07/14/2020    Memory loss Julu2 2020    Motion sickness     Motion sickness     Multiple closed fractures of ribs of right side 03/22/2023    Nasal bones, closed fracture 03/22/2023    Neck pain     Obesity 1978    Obesity (BMI 30.0-34.9)     Olecranon bursitis of right elbow 07/29/2024    Other chest pain 09/13/2021    Other fatigue 09/21/2023    Palpitations 07/18/2023    Peripheral neuropathy     Pollen allergies     Preoperative clearance 06/27/2019    Reactive depression 07/17/2024    RLS (restless legs syndrome)     S/P foot surgery 07/20/2022    SCC (squamous cell carcinoma) 05/04/2021    left mid forehead    SCC (squamous cell carcinoma) 2023    chest    Seasonal allergies     Shingles 01 05 23    Sleep apnea     has inspire     Squamous cell skin cancer 2007    left cheek     Stroke (HCC)     Swollen ankles     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, left     great toe fusion    Transaminitis 06/03/2024    Urinary incontinence     Urinary tract infection 03/28/2022    Wears glasses      Past Surgical History:   Procedure Laterality Date    ABDOMINAL SURGERY  1997,2015,1972    Nissen x2 1972 tubal ligarion    ARTHROSCOPY KNEE Right     BREAST BIOPSY      stereotactic-benign    BREAST BIOPSY      stereo-benign    BREAST CYST EXCISION Bilateral     benign- done in [place]-neg    BREAST EXCISIONAL BIOPSY      unknown date-benign    BREAST EXCISIONAL BIOPSY      unknown date-benign    BREAST EXCISIONAL BIOPSY      unknown date-benign    BREAST EXCISIONAL BIOPSY      unknown age-benign    CARDIAC CATHETERIZATION      CATARACT EXTRACTION Right     CHOLECYSTECTOMY      COLONOSCOPY      EXAMINATION UNDER ANESTHESIA N/A 06/24/2021    Procedure: EXAM UNDER ANESTHESIA (EUA), DISE;  Surgeon: Gregory Maier MD;  [facility];  Service: ENT    HERNIA REPAIR      hiatal    HIATAL HERNIA REPAIR      KNEE SURGERY Right     Torn maniscus lap surg    LIPOSUCTION      LYMPH NODE BIOPSY      MOHS SURGERY  05/20/2021    left mid forehead-Mickey    MOHS SURGERY Left 05/27/2021    Left nasal tip- mickey     WI LIGATION/BIOPSY TEMPORAL ARTERY Left 01/05/2023    Procedure: BIOPSY ARTERY TEMPORAL;  Surgeon: Alec Dennis DO;  [facility];  Service: Vascular    WI OPEN IMPLANTATION CRANIAL NERVE BOOM & PULSE GEN N/A 11/10/2021    Procedure: INSERTION UPPER AIRWAY STIMULATOR, INSPIRE IMPLANT;  Surgeon: Gregory Maier MD;  Location[facility];  Service: ENT    WI UNLISTED PROCEDURE FOOT/TOES Right 07/19/2022    Procedure: 1st metatarsal phalangeal joint fusion;  Surgeon: Dede Bonner DPM;  Location: [facility];  Service: Podiatry    REDUCTION MAMMAPLASTY Bilateral 2000    REDUCTION MAMMAPLASTY      SKIN BIOPSY      SKIN CANCER EXCISION   2007    squamous cell carcinoma     SKIN CANCER EXCISION  2007    basal cell carcinoma    SKIN CANCER EXCISION  2023    SCCIS chest    SQUAMOUS CELL CARCINOMA EXCISION      TOE SURGERY Right 08/04/2022    Right Great Toe Fusion    TONSILLECTOMY      TUBAL LIGATION      UPPER GASTROINTESTINAL ENDOSCOPY      US GUIDED BREAST BIOPSY RIGHT COMPLETE Right 07/18/2022     Family History   Problem Relation Age of Onset    Heart disease Mother     Depression Mother     Hypertension Mother     COPD Mother     Hearing loss Mother     Anxiety disorder Mother     Heart disease Father     Lung cancer Father 70        Smoker     Cancer Father         brain    Alcohol abuse Father     Dementia Father     Hypertension Father     Thyroid disease Father     COPD Father     Arthritis Father     Brain cancer Father 74    Brain cancer Sister     Hypertension Sister     Diabetes Sister     Heart disease Sister     Thyroid disease Sister     Cancer Sister         Lympoma, lung    Lung cancer Sister 79    Anxiety disorder Sister     Migraines Sister     Hypertension Brother     Diabetes Brother     Cancer Brother         Throat    Dementia Brother     Stroke Brother     Hypertension Brother     Heart disease Brother     Diabetes Brother     Hypertension Brother     Allergies Brother     No Known Problems Son     No Known Problems Son     Brain cancer Paternal Aunt         unknown age    Breast cancer Neg Hx       reports that she quit smoking about 49 years ago. Her smoking use included cigarettes. She started smoking about 59 years ago. She has a 20 pack-year smoking history. She has been exposed to tobacco smoke. She has never used smokeless tobacco. She reports current alcohol use. She reports that she does not use drugs.  Current Outpatient Medications on File Prior to Visit   Medication Sig Dispense Refill    acetaminophen (TYLENOL) 500 mg tablet Take 650 mg by mouth as needed      amLODIPine (NORVASC) 5 mg tablet TAKE 2 TABLETS BY  MOUTH EVERY  tablet 1    aspirin (ECOTRIN LOW STRENGTH) 81 mg EC tablet Take 1 tablet (81 mg total) by mouth daily Do not start before October 31, 2023.  0    B-D ULTRAFINE III SHORT PEN 31G X 8 MM MISC   3    Calcium Citrate 250 MG TABS Take 2 tablets (500 mg total) by mouth in the morning      Coenzyme Q10 (CoQ10) 100 MG CAPS Take 100 mg by mouth in the morning Bed time      Cyanocobalamin (Vitamin B12) 1000 MCG TBCR Take 1 tablet (1,000 mcg total) by mouth once a week 12 tablet 0    escitalopram (LEXAPRO) 10 mg tablet Take 1 tablet (10 mg total) by mouth daily 90 tablet 3    gabapentin (NEURONTIN) 100 mg capsule Take 1 capsule (100 mg total) by mouth 2 (two) times a day (Patient taking differently: Take 100 mg by mouth 2 (two) times a day as needed (For neuropathy pain)) 60 capsule 0    hydrALAZINE (APRESOLINE) 25 mg tablet Take 1 tablet (25 mg total) by mouth 2 (two) times a day 180 tablet 3    Icosapent Ethyl (Vascepa) 0.5 g CAPS Take 2 g by mouth 2 (two) times a day 180 capsule 3    insulin aspart (NovoLOG) 100 units/mL injection Inject under the skin 3 (three) times a day before meals 5units, 5units, 12units.      insulin detemir (Levemir FlexTouch) 100 Units/mL injection pen Inject 20 Units under the skin every evening      levothyroxine 100 mcg tablet TAKE 1 TABLET BY MOUTH EVERY DAY 90 tablet 1    methenamine hippurate (HIPREX) 1 g tablet Take 1 tablet (1 g total) by mouth 2 (two) times a day with meals 180 tablet 1    metoprolol tartrate (LOPRESSOR) 50 mg tablet Take 1 tablet (50 mg total) by mouth every 12 (twelve) hours 180 tablet 1    pantoprazole (PROTONIX) 40 mg tablet Take 1 tablet (40 mg total) by mouth 2 (two) times a day 60 tablet 5    pramipexole (MIRAPEX) 0.25 mg tablet Take 1 tablet (0.25 mg total) by mouth daily at bedtime 90 tablet 1    Probiotic Product (PROBIOTIC BLEND PO) Take by mouth      rosuvastatin (CRESTOR) 5 mg tablet Take 1 tablet (5 mg total) by mouth daily 90 tablet 3     sucralfate (CARAFATE) 1 g tablet TAKE 1 TABLET (1 G TOTAL) BY MOUTH 3 (THREE) TIMES A DAY WITH MEALS 270 tablet 2    torsemide (DEMADEX) 10 mg tablet TAKE 10 MG. DAILY EXCEPT MONDAY, WEDNESDAY, FRIDAY 20 MG. (Patient taking differently: Take 10 mg by mouth daily Take 10 mg. Daily except Monday, Wednesday, Friday 20 mg.) 150 tablet 1    Vitamin D, Cholecalciferol, 25 MCG (1000 UT) TABS Take 2,000 Units by mouth in the morning      [DISCONTINUED] AMILoride 5 mg tablet Take 1 tablet (5 mg total) by mouth daily 30 tablet 5    albuterol (Ventolin HFA) 90 mcg/act inhaler Inhale 2 puffs every 6 (six) hours as needed for wheezing 54 g 0    Icosapent Ethyl (Vascepa) 0.5 g CAPS Take 2 g by mouth 2 (two) times a day 360 capsule 3    meclizine (ANTIVERT) 12.5 MG tablet Take 1 tablet (12.5 mg total) by mouth 2 (two) times a day (Patient not taking: Reported on 2/11/2025) 6 tablet 0     No current facility-administered medications on file prior to visit.     Allergies   Allergen Reactions    Ciprofloxacin Hives    Pollen Extract Allergic Rhinitis    Sulfamethoxazole-Trimethoprim Tongue Swelling      Current Outpatient Medications on File Prior to Visit   Medication Sig Dispense Refill    acetaminophen (TYLENOL) 500 mg tablet Take 650 mg by mouth as needed      amLODIPine (NORVASC) 5 mg tablet TAKE 2 TABLETS BY MOUTH EVERY  tablet 1    aspirin (ECOTRIN LOW STRENGTH) 81 mg EC tablet Take 1 tablet (81 mg total) by mouth daily Do not start before October 31, 2023.  0    B-D ULTRAFINE III SHORT PEN 31G X 8 MM MISC   3    Calcium Citrate 250 MG TABS Take 2 tablets (500 mg total) by mouth in the morning      Coenzyme Q10 (CoQ10) 100 MG CAPS Take 100 mg by mouth in the morning Bed time      Cyanocobalamin (Vitamin B12) 1000 MCG TBCR Take 1 tablet (1,000 mcg total) by mouth once a week 12 tablet 0    escitalopram (LEXAPRO) 10 mg tablet Take 1 tablet (10 mg total) by mouth daily 90 tablet 3    gabapentin (NEURONTIN) 100 mg capsule  Take 1 capsule (100 mg total) by mouth 2 (two) times a day (Patient taking differently: Take 100 mg by mouth 2 (two) times a day as needed (For neuropathy pain)) 60 capsule 0    hydrALAZINE (APRESOLINE) 25 mg tablet Take 1 tablet (25 mg total) by mouth 2 (two) times a day 180 tablet 3    Icosapent Ethyl (Vascepa) 0.5 g CAPS Take 2 g by mouth 2 (two) times a day 180 capsule 3    insulin aspart (NovoLOG) 100 units/mL injection Inject under the skin 3 (three) times a day before meals 5units, 5units, 12units.      insulin detemir (Levemir FlexTouch) 100 Units/mL injection pen Inject 20 Units under the skin every evening      levothyroxine 100 mcg tablet TAKE 1 TABLET BY MOUTH EVERY DAY 90 tablet 1    methenamine hippurate (HIPREX) 1 g tablet Take 1 tablet (1 g total) by mouth 2 (two) times a day with meals 180 tablet 1    metoprolol tartrate (LOPRESSOR) 50 mg tablet Take 1 tablet (50 mg total) by mouth every 12 (twelve) hours 180 tablet 1    pantoprazole (PROTONIX) 40 mg tablet Take 1 tablet (40 mg total) by mouth 2 (two) times a day 60 tablet 5    pramipexole (MIRAPEX) 0.25 mg tablet Take 1 tablet (0.25 mg total) by mouth daily at bedtime 90 tablet 1    Probiotic Product (PROBIOTIC BLEND PO) Take by mouth      rosuvastatin (CRESTOR) 5 mg tablet Take 1 tablet (5 mg total) by mouth daily 90 tablet 3    sucralfate (CARAFATE) 1 g tablet TAKE 1 TABLET (1 G TOTAL) BY MOUTH 3 (THREE) TIMES A DAY WITH MEALS 270 tablet 2    torsemide (DEMADEX) 10 mg tablet TAKE 10 MG. DAILY EXCEPT MONDAY, WEDNESDAY, FRIDAY 20 MG. (Patient taking differently: Take 10 mg by mouth daily Take 10 mg. Daily except Monday, Wednesday, Friday 20 mg.) 150 tablet 1    Vitamin D, Cholecalciferol, 25 MCG (1000 UT) TABS Take 2,000 Units by mouth in the morning      [DISCONTINUED] AMILoride 5 mg tablet Take 1 tablet (5 mg total) by mouth daily 30 tablet 5    albuterol (Ventolin HFA) 90 mcg/act inhaler Inhale 2 puffs every 6 (six) hours as needed for wheezing  "54 g 0    Icosapent Ethyl (Vascepa) 0.5 g CAPS Take 2 g by mouth 2 (two) times a day 360 capsule 3    meclizine (ANTIVERT) 12.5 MG tablet Take 1 tablet (12.5 mg total) by mouth 2 (two) times a day (Patient not taking: Reported on 2025) 6 tablet 0     No current facility-administered medications on file prior to visit.      Social History     Tobacco Use    Smoking status: Former     Current packs/day: 0.00     Average packs/day: 2.0 packs/day for 10.0 years (20.0 ttl pk-yrs)     Types: Cigarettes     Start date: 1966     Quit date: 1976     Years since quittin.1     Passive exposure: Past    Smokeless tobacco: Never    Tobacco comments:     Smoked 2 pack a day   Vaping Use    Vaping status: Never Used   Substance and Sexual Activity    Alcohol use: Yes     Comment: 1 or 2 a year    Drug use: No    Sexual activity: Not Currently     Partners: Male     Birth control/protection: Abstinence, Post-menopausal, Female Sterilization     Comment: defer        Objective   Ht 5' 2\" (1.575 m)   Wt 69.8 kg (153 lb 12.8 oz)   BMI 28.13 kg/m²      Physical Exam  BP sitting on right: 124/60 with a heart rate of 56 and regular  BP standing on right: 116/58 with a heart rate of 60 and regular  Physical Exam: General:  No acute distress  Skin:  No acute rash  Eyes:  No scleral icterus and noninjected  ENT:  Moist mucous membranes  Neck:  Supple, no jugular venous distention, trachea midline, overall appearance is normal  Chest:  Clear to auscultation  CVS:  Regular rate and rhythm, without a rub or gallops  Abdomen:  Normal bowel sounds, soft and nontender and nondistended  Extremities:  No edema, and no cyanosis, no significant arthritic changes  Neuro:  No gross focality  Psych:  Alert and oriented and appropriate      "

## 2025-02-10 NOTE — ASSESSMENT & PLAN NOTE
Acceptable level monitor do not overtreat  Orders:    Basic metabolic panel; Future    Basic metabolic panel; Future    CBC; Future    Phosphorus; Future    Magnesium; Future

## 2025-02-10 NOTE — ASSESSMENT & PLAN NOTE
At goal continue to monitor  Orders:    Basic metabolic panel; Future    Basic metabolic panel; Future    CBC; Future    Phosphorus; Future    Magnesium; Future              41 minutes were spent face to face with patient with greater than 50% of the time spent in counseling or coordination of care including discussions of findings and workup and treatment instructions.  All of the patient's questions were answered to their satisfaction during this discussion.  I advised the patient to call if there is any further questions or concerns.

## 2025-02-10 NOTE — ASSESSMENT & PLAN NOTE
Hemoglobin stable, low iron studies replete iron over-the-counter 3 days a week    Orders:    Basic metabolic panel; Future    Basic metabolic panel; Future    CBC; Future    Phosphorus; Future    Magnesium; Future

## 2025-02-10 NOTE — ASSESSMENT & PLAN NOTE
Not SGLT2 inhibitor candidate given frequent urinary tract infections  Orders:    Basic metabolic panel; Future    Basic metabolic panel; Future    CBC; Future    Phosphorus; Future    Magnesium; Future

## 2025-02-10 NOTE — ASSESSMENT & PLAN NOTE
Low normal today, although slightly high at home there is some orthostasis  Will make changes stopping amiloride and adjusting torsemide  Recheck blood pressures in a few weeks she will also bring in her blood pressure device to reassess    Orders:    Basic metabolic panel; Future    Basic metabolic panel; Future    CBC; Future    Phosphorus; Future    Magnesium; Future

## 2025-02-10 NOTE — TELEPHONE ENCOUNTER
Called patient and went over the following information:    Lets change amiloride to just Monday Wednesday and Friday   Repeat a BMP in 2 weeks .    Patient verbally understood and had no further questions for me at this time.  Labs have been added to the patients chart.

## 2025-02-10 NOTE — ASSESSMENT & PLAN NOTE
At goal continue to monitor  Orders:    Basic metabolic panel; Future    Basic metabolic panel; Future    CBC; Future    Phosphorus; Future    Magnesium; Future

## 2025-02-10 NOTE — TELEPHONE ENCOUNTER
Spoke with patient letting her know that as of right now the only openings are with Dr. Baires tomorrow at 7:30a or 8a.  She decided to come in tomorrow at 7:30a to talk with Dr. Baires.

## 2025-02-11 ENCOUNTER — OFFICE VISIT (OUTPATIENT)
Dept: NEPHROLOGY | Facility: CLINIC | Age: 75
End: 2025-02-11
Payer: MEDICARE

## 2025-02-11 VITALS — BODY MASS INDEX: 28.3 KG/M2 | HEIGHT: 62 IN | WEIGHT: 153.8 LBS

## 2025-02-11 DIAGNOSIS — E78.2 MIXED HYPERLIPIDEMIA: ICD-10-CM

## 2025-02-11 DIAGNOSIS — N18.5 STAGE 5 CHRONIC KIDNEY DISEASE NOT ON CHRONIC DIALYSIS (HCC): ICD-10-CM

## 2025-02-11 DIAGNOSIS — N18.5 TYPE 2 DIABETES MELLITUS WITH STAGE 5 CHRONIC KIDNEY DISEASE NOT ON CHRONIC DIALYSIS, WITH LONG-TERM CURRENT USE OF INSULIN (HCC): Primary | ICD-10-CM

## 2025-02-11 DIAGNOSIS — N18.5 ANEMIA IN STAGE 5 CHRONIC KIDNEY DISEASE, NOT ON CHRONIC DIALYSIS  (HCC): Chronic | ICD-10-CM

## 2025-02-11 DIAGNOSIS — Z79.4 TYPE 2 DIABETES MELLITUS WITH STAGE 5 CHRONIC KIDNEY DISEASE NOT ON CHRONIC DIALYSIS, WITH LONG-TERM CURRENT USE OF INSULIN (HCC): Primary | ICD-10-CM

## 2025-02-11 DIAGNOSIS — D63.1 ANEMIA IN STAGE 5 CHRONIC KIDNEY DISEASE, NOT ON CHRONIC DIALYSIS  (HCC): Chronic | ICD-10-CM

## 2025-02-11 DIAGNOSIS — N25.81 SECONDARY HYPERPARATHYROIDISM OF RENAL ORIGIN (HCC): Chronic | ICD-10-CM

## 2025-02-11 DIAGNOSIS — E55.9 VITAMIN D DEFICIENCY: Chronic | ICD-10-CM

## 2025-02-11 DIAGNOSIS — E11.22 TYPE 2 DIABETES MELLITUS WITH STAGE 5 CHRONIC KIDNEY DISEASE NOT ON CHRONIC DIALYSIS, WITH LONG-TERM CURRENT USE OF INSULIN (HCC): Primary | ICD-10-CM

## 2025-02-11 DIAGNOSIS — I12.0 BENIGN HYPERTENSIVE KIDNEY DISEASE WITH CHRONIC KIDNEY DISEASE STAGE V OR END STAGE RENAL DISEASE (HCC): ICD-10-CM

## 2025-02-11 PROCEDURE — G2211 COMPLEX E/M VISIT ADD ON: HCPCS | Performed by: INTERNAL MEDICINE

## 2025-02-11 PROCEDURE — 99215 OFFICE O/P EST HI 40 MIN: CPT | Performed by: INTERNAL MEDICINE

## 2025-02-11 RX ORDER — ICOSAPENT ETHYL 1 G/1
2 CAPSULE ORAL 2 TIMES DAILY
Qty: 360 CAPSULE | Refills: 3 | Status: SHIPPED | OUTPATIENT
Start: 2025-02-11

## 2025-02-11 NOTE — PATIENT INSTRUCTIONS
Visit summary:  - As we discussed your kidney function is slightly worse although not horribly worse, some of your symptoms may be related to the progression of the kidney disease but some may be related to diabetes and other issues that Dr. Aguilar should follow with you.  As we discussed I am going to have you review peritoneal dialysis with our center downstairs we will help to facilitate.  I would like you to think about which type of kidney machine dialysis you would prefer so we can make decisions at the next appointment.  - If your symptoms get worse let me know right away or go to the hospital and I will list these out now:    SYMPTOMS TO WATCH FOR THAT MAY INDICATE WORSENING KIDNEY FUNCTION REQUIRING TREATMENT:  PLEASE CONTACT ME IF YOU DEVELOPED ANY OF THESE SYMPTOMS!!  SHORTNESS OF BREATH OR WORSENING LEG SWELLING  NAUSEA AND VOMITING THAT IS PERSISTENT  DECREASED APPETITE THAT IS PERSISTENT  DECREASE IN ENERGY AND LETHARGY  CONFUSION  SLEEPING MUCH MORE  OR ANY OTHER UNUSUAL SYMPTOMS THAT IS CONCERNING; AGAIN PLEASE CONTACT ME RIGHT AWAY  -Your blood pressure is slightly high at home but does decrease when you stand I am going to make some adjustments and we will follow it I will have you take a week of blood pressures morning evening sitting and standing 1 week before your appointment and bring in your device at that appointment.        1. Medication changes today:  Please change torsemide to just 10 mg daily  Please stop amiloride completely    2.  General instructions:  Please continue to avoid salt  Remain as active as you are able to    3.  Please go for non fasting  lab work 1-2 weeks after making the above medication change.    4.  Please go for lab work March 6 or March 7 prior to your appointment    5.  Please take 1 week a blood pressure readings as listed above    AS FOLLOWS  MORNING AND EVENING, SITTING AND STANDING as follows:  TAKE THE MORNING READINGS BEFORE ANY MEDICATIONS AND WHEN YOU ARE  RELAXED FOR SEVERAL MINUTES  TAKE THE EVENING READINGS:  BETWEEN 7-10 P.M.; PRIOR TO ANY MEDICATIONS; AT LEAST IN OUR  FROM DINNER; AND CERTAINLY AFTER RELAXING FOR A FEW MINUTES  PLEASE INCLUDE HEART RATE WITH YOUR BLOOD PRESSURE READINGS  When taking standing readings, keep your arm supported at heart level and not dangling  Make sure you are sitting with your back supported and feet on the ground and do not cross your legs or feet  Make sure you have not taken any coffee or caffeine products or exercised or smoke cigarettes at least 30 minutes before taking your blood pressure  Then please mail these readings into the office            6.  Follow-up in 1 months  Please bring in 1 week a blood pressure readings morning evening, sitting and standing is outlined above  PLEASE BRING AN YOUR BLOOD PRESSURE MACHINE TO CORRELATE WITH THE OFFICE MACHINE AT THIS NEXT SCHEDULED VISIT  Please go for fasting lab work 1-2 weeks prior to your appointment      7.  General non medical recommendations:  AVOID SALT BUT NOT ADDING AN READING LABELS TO MAKE SURE THERE IS LOW-SALT IN THE FOOD THAT YOU ARE EATING  Goal is less than 2 g of sodium intake or less than 5 g of sodium chloride intake per day    Avoid nonsteroidal anti-inflammatory drugs such as Naprosyn, ibuprofen, Aleve, Advil, Celebrex, Meloxicam (Mobic) etc.  You can use Tylenol as needed if you do not have any liver condition to be concerned about    Avoid medications such as Sudafed or decongestants and antihistamines that contained the D component which is the decongestant.  You can take antihistamines without the decongestant or D component.    Try to avoid medications such as pantoprazole or  Protonix/Nexium or Esomeprazole)/Prilosec or omeprazole/Prevacid or lansoprazole/AcipHex or Rabeprazole.  If you are able to, use Pepcid as this is safer for your kidneys.    Try to exercise at least 30 minutes 3 days a week to begin with with an ultimate goal of 5  days a week for at least 30 minutes    Please do not drink more than 2 glasses of alcohol/wine on a daily basis as this may contribute to your high blood pressure.

## 2025-02-11 NOTE — LETTER
2025     Tree Aguilar MD  [address] [place] [id number]    Patient: Trina Davis   YOB: 1950   Date of Visit: 2025       Dear Dr. Aguilar:    Thank you for referring Trina Davis to me for evaluation. Below are my notes for this consultation.    If you have questions, please do not hesitate to call me. I look forward to following your patient along with you.         Sincerely,        Donaldo Baires MD        CC: No Recipients    Donaldo Baires MD  2025  8:09 AM  Sign when Signing Visit  Name: Trina Davis      : 1950      MRN: [id number]  Encounter Provider: Donaldo Baires MD  Encounter Date: 2025   Encounter department: [facility] [place]  :  Assessment & Plan  Stage 5 chronic kidney disease not on chronic dialysis (HCC)  Progressing  Current status progression may be related to medications or just ongoing progression of diabetic kidney disease  Will adjust medications recheck labs  Patient now is interested in some form of renal placement therapy will review peritoneal dialysis.  Monitor uremic symptoms closely if they persist or progress to consider initiation of renal placement therapy I will reassess this after patient has had a chance to review the modalities in the next few weeks  Benign hypertensive kidney disease with chronic kidney disease stage V or end stage renal disease (HCC)  Low normal today, although slightly high at home there is some orthostasis  Will make changes stopping amiloride and adjusting torsemide  Recheck blood pressures in a few weeks she will also bring in her blood pressure device to reassess    Orders:  •  Basic metabolic panel; Future  •  Basic metabolic panel; Future  •  CBC; Future  •  Phosphorus; Future  •  Magnesium; Future    Anemia in stage 5 chronic kidney disease, not on chronic dialysis  (HCC)  Hemoglobin stable, low iron studies replete iron over-the-counter 3 days a week    Orders:  •  Basic  metabolic panel; Future  •  Basic metabolic panel; Future  •  CBC; Future  •  Phosphorus; Future  •  Magnesium; Future    Secondary hyperparathyroidism of renal origin (HCC)  Acceptable level monitor do not overtreat  Orders:  •  Basic metabolic panel; Future  •  Basic metabolic panel; Future  •  CBC; Future  •  Phosphorus; Future  •  Magnesium; Future    Type 2 diabetes mellitus with stage 5 chronic kidney disease not on chronic dialysis, with long-term current use of insulin (HCC)  Not SGLT2 inhibitor candidate given frequent urinary tract infections  Orders:  •  Basic metabolic panel; Future  •  Basic metabolic panel; Future  •  CBC; Future  •  Phosphorus; Future  •  Magnesium; Future    Vitamin D deficiency  At goal continue to monitor  Orders:  •  Basic metabolic panel; Future  •  Basic metabolic panel; Future  •  CBC; Future  •  Phosphorus; Future  •  Magnesium; Future    Mixed hyperlipidemia  At goal continue to monitor  Orders:  •  Basic metabolic panel; Future  •  Basic metabolic panel; Future  •  CBC; Future  •  Phosphorus; Future  •  Magnesium; Future        History of Present Illness  HPI  Trina Davis is a 74 y.o. female who presents follow-up regarding CKD 5  There has been no hospitalizations or acute illnesses since last visit.  The patient complaining of more fatigue, fair appetite but has lost weight  More sleepy through the day  Feels a little unsteady when she ambulates  Some chills but no temperature no cough except GERD last p.m. may have contributed to mild cough  No hematuria, dysuria, voiding symptoms or foamy urine although does not feel she is urinating as much as usual  On and off nausea no vomiting some loose stools since recent GI bleeding but improving  No chest pain, some mild dyspnea on exertion slightly worse than usual but has been present, no leg swelling  Occasional headaches, some dizziness or lightheadedness but intermingled with  unsteadiness  Blood pressure  medications:  Amiloride 5 mg daily in the morning but just switched to 3 times a week as of 2/10/2025  Amlodipine 10 mg daily  Hydralazine 25 mg twice a day  Metoprolol tartrate 50 mg twice a day  Torsemide 10 mg daily extremity Wednesday and Friday 20 mg    Renal pertinent medications:  Vascepa 1000 mg twice a day  Rosuvastatin 5 mg daily  Baby aspirin daily  Gabapentin 100 mg twice a day only as needed  Pantoprazole 40 mg twice a day  Vitamin D 2000 units daily      Review of Systems  Please see HPI, otherwise the review of systems as completely reviewed with the patient are negative    Pertinent Medical History  1.  CKD stage 5.:  Etiology:  Diabetic nephropathy/hypertensive nephrosclerosis/arteriolar nephrosclerosis/chronic NSAID use.  No evidence of obstructive uropathy, renal artery disease or primary glomerular disease with a bland urinalysis  Of note, CPK was normal at 105 no evidence rhabdomyolysis.  Baseline creatinine: Most recently 2.7-3.5  Current creatinine 3.97 higher than has been probable progression from 2/7/2025  Urine protein creatinine ratio: 2.9 g higher once again  UA demonstrated no significant hematuria but 3+ proteinuria from  Serologies:  Normal C3/C4; negative rheumatoid factor; negative SPEP:  Negative UPEP;  Light chain ratio 2.09 essentially normal for chronic kidney disease  negative ELOY; negative hepatitis-C; normal IgA; normal ASO; negative RPR     Renal ultrasound no hydronephrosis small bilateral renal simple cyst left upper pole nonobstructing calculi okay        Recommendations:  Treat hypertension-please see below  Treat dyslipidemia-please see below  Maintain proteinuria less than 1 g or as low as possible: Adjust antihypertensive regimen in order to try to get to proteinuria less than 1 g please see below  Avoid nephrotoxic agents such as NSAIDs, patient counseled as such  S/p kidney smart  ACP meeting completed actually no dialysis however now she is considering it we will  have her review PD.  If she feels no better after some changes on her regimen to consider initiation of PD versus in center hemodialysis  Follow labs closely after adjustments     2.  Volume:   Current status:  Euvolemic   Torsemide dose: Currently on torsemide 10 milligrams daily from 3 days a week 20 mg she seems possibly mildly prerenal no evidence of volume overload     3.  Hypertension:  Workup:  saline suppression test for primary aldosterone state was normal  plasma free metanephrines was negative  renal artery duplex was negative 02/2019       Current blood pressure averages:   AM: 155/74 standing, 141/71  PM: 147/67, standing 127/66  Heart rate: 50-60 range        Goal blood pressure:  less than 125/75 given less than 1 g of proteinuria at this time  Recommendations:  Push nonmedical regimen including weight loss, isotonic exercise and avoidance of salt; patient counseled as such  Medication changes today:   Decrease torsemide  Stop amiloride  Recheck blood pressures in the next few weeks and she will bring in her device in the next few weeks     4.  Electrolytes:   Mild metabolic acidosis from renal disease doing well at 27  Hyperkalemia now better at 4.7 stop amiloride at this time given slightly high potassium and increasing creatinine     5.  Mineral bone disorder:  Calcium/magnesium/phosphorus:  All acceptable,   PTH intact: 88.3 from 1/30/2025 although slightly elevated doing well do not overtreat  Vitamin-D: 30.1 from 4/1/2024 at goal continue to monitor     6.  Dyslipidemia:  Goal LDL:  Less than 100  Current lipid profile: LDL 25/HDL 34/triglycerides 331 from 4/8/24  Recommendations:  Per Endocrinology but essentially at goal        7.  Anemia:   Current hemoglobin 11.6 stablel after recent GI bleeding from 1/30/2025  Followed closely by GI  Studies from 1/30/2025: Saturation 27% ferritin only 28 recommend oral iron 3 days a week      8.  Other problems:  Depression  Diabetes mellitus per primary  medical physician: I would avoid SGLT2 inhibitors at the moment with frequent urinary tract infections  Hypothyroidism  SHAYAN on CPAP  Fibromyalgia/osteoarthritis:  Patient with myoclonic movements, seems related to gabapentin it was adjusted with resolution.  Nephrolithiasis  Basal cell/squamous CA of the skin  Urinary stress incontinence  Status post incisional hernia repairs  hospitalization as outlined below from severe GERD with hypoxemia and shortness of breath from a failed Nissen fundoplication from prior hiatal hernia repair.  Patient is due to have further testing with an EGD by GI and then subsequently thoracic surgery consultation for possible repair(however, according to the patient at this juncture she was felt I risk so no surgery is planned at this time)  In addition, she was placed on Protonix 40 b.i.d. and Pepcid 40 mg hs  Hospitalization on 07/14/2020 secondary confusion at home.  Apparently she had protracted hospital course in [place] following aspiration pneumonia.  Ten days in the ICU on a ventilator.  No overt infectious process.  Low probability for pulmonary embolus despite an elevated D-dimer.  Had mild leukocytosis/SIRS criteria subsequently discharged 1 day later when she clinically improved.  Symptoms were felt secondary to her protracted ICU course.   hospitalization with discharge on 05/18/21:  Epigastric pain following an EGD on 05/13/2021 for Botox injection at the pylorus for treatment for gastroparesis.  Apparently associated with chocolate ingestion and possible cough with aspiration  Complicated by NIKOS with creatinine to 2.6 which improved with IV fluids TO 1.92 ON 05/18/2021   Hospitalization 04/13/2022:  With hypotension fatigue noted to have a low blood pressure mild increasing creatinine, dysuria treated with Bactrim but had an allergic reaction to it as an outpatient she received intravenous ceftriaxone and she was discharged on doxycycline.  Recently placed on  spironolactone for blood pressure all medications were held decide from metoprolol.  Amlodipine was resumed, torsemide re-initiated, but spironolactone was stopped.  Maintain off hydralazine possibly add olmesartan  Creatinine initially as high as 2.27 reduce down to 2.16 upon discharge  Patient admitted 1/7/2023 secondary headache and confusion left temporal symptoms and temporal artery tenderness with an elevated ESR started empirically on steroids per neurology recommendation status post temporal artery biopsy.  However she developed a vesicular rash felt to have herpes zoster ophthalmologists started on Valtrex seen by ophthalmology eyedrops also initiated.  Patient discharged on Valtrex and high-dose gabapentin with follow-up with her primary care physician.  Patient with leukocytosis most compatible with steroids  Admitted 8/19/2023 after recent UTI history of urinary retention and straight catheterizes herself: Admitted with NIKOS from poor oral intake UTI with Serratia given IV fluids placed on ertapenem/change in mental status noted.  Urinary straight catheterizations recommended to be increased by urology.  Torsemide 5 mg every other day.  Admitted 9/9 with change in mental status status post recent discharge earlier in September with UTI.  Seen by geriatrics recommended formal cognitive testing as an outpatient.  ([place] multifactorial from chronic medical conditions?  Underlying dementia): Patient's course complicated by acute kidney injury with a creatinine of 2.5 on admission diuretics were held gentle IV fluids creatinine back to baseline 2.07  Admitted 9/12/2023: COPD exacerbation treated with steroids nebulizers and oxygen.     Hospitalized 10/1/2024: With hematochezia underwent colonoscopy required clipping of Dieulafoy lesion at that time.  Subsequent colonoscopy 9/26 had 2 tubular adenomas removed as well as additional clipping to the lesion is noted.  Subsequent repeat Ace colonoscopy 9/3...  "Subsequently 2 more clips were placed reinforced the bleeding in the ascending colon and hemoglobin remained stable.     GI health maintenance: Please see above 9/30/2024  Medical History Reviewed by provider this encounter:  Allergies  Meds     .  Past Medical History  Past Medical History:   Diagnosis Date   • Actinic keratosis    • Acute blood loss anemia 09/24/2024   • Acute cystitis without hematuria 04/28/2023   • Acute cystitis without hematuria 04/28/2023   • Acute respiratory failure with hypoxia (HCC) 02/15/2024   • Acute-on-chronic kidney injury  (HCC)    • NIKOS (acute kidney injury) (HCC) 05/08/2020   • Allergic    • Allergic rhinitis    • Ambulatory dysfunction 10/22/2020   • AMS (altered mental status) 07/14/2020   • Anesthesia     pt reports \"had to use double lumen endo tube for hiatal hernia repair/so surgeon could get to where he needed to work\"   • Arthritis    • Aspiration into airway    • At high risk for falls 07/17/2024   • Michael esophagus 1993    Lot of stress with children   • Basal cell carcinoma 2007    left cheek    • BCC (basal cell carcinoma) 05/27/2021    Left Nasal tip   • Breast discharge 06/19/2023   • Breast pain 06/19/2023   • Cancer (HCC)     squamous cell cancer on forhead   • Cancer (HCC)     basal cell on nose    • Cholelithiasis    • Chronic kidney disease 2000, 2018    Stones, kidney disease stage 4   • Chronic pain disorder     bilat feet and joint pain on occas   • Colon polyp    • Confusion 10/30/2023   • Constipation    • COPD (chronic obstructive pulmonary disease) (HCC)    • COVID-19 08/2021    recovered at home/did receive monoclonal infusion   • COVID-19 virus infection 08/16/2021   • Dental bridge present    • Depression    • Diabetes mellitus (HCC)     Type 2   • Diabetic neuropathy (HCC)    • Difficulty walking 2017   • Disease of thyroid gland    • Dyspnea    • Elevated liver enzymes 04/04/2023   • Epigastric abdominal pain with severe diabetic gastroparesis, " status post EGD/Botox injection, 5/14 05/17/2021   • Facial abrasion, initial encounter 03/22/2023   • Fall 03/22/2023   • Family history of thyroid problem    • Fatty liver    • Fibromyalgia, primary    • Fracture of orbital floor, blow-out, right, closed, with routine healing, subsequent encounter 03/22/2023   • Fracture of orbital floor, right side, initial encounter for closed fracture (HCC) 03/22/2023   • GERD (gastroesophageal reflux disease)    • Heart burn    • Hiatal hernia    • History of cholecystectomy 10/27/2023   • History of colon polyps 07/30/2021   • History of kidney stones 09/29/2020    Hx of recurrent kidney stones   • History of kidney stones 09/29/2020    Hx of recurrent kidney stones  Formatting of this note might be different from the original. Hx of recurrent kidney stones  Last Assessment & Plan:  Formatting of this note might be different from the original. We will set her up for follow-up in 3 months with a KUB prior.  She knows to call if she has any kidney stone type pain in the meantime.   • History of Nissen fundoplication 10/27/2023   • History of pneumonia    • History of repair of hiatal hernia 05/03/2020   • Hyperlipidemia    • Hyperplasia, parathyroid (HCC)    • Hypertension    • Hypertensive urgency 10/27/2023   • Hyponatremia 04/26/2023   • Hypotension 04/13/2022   • 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 01/06/2023   • Kidney problem    • Kidney stone    • Left hip pain 10/05/2023   • Leukocytosis 07/14/2020   • Memory loss Julu2 2020   • Motion sickness    • Motion sickness    • Multiple closed fractures of ribs of right side 03/22/2023   • Nasal bones, closed fracture 03/22/2023   • Neck pain    • Obesity 1978   • Obesity (BMI 30.0-34.9)    • Olecranon bursitis of right elbow 07/29/2024   • Other chest pain 09/13/2021   • Other fatigue 09/21/2023   • Palpitations 07/18/2023   • Peripheral neuropathy    • Pollen allergies    • Preoperative clearance 06/27/2019   •  Reactive depression 07/17/2024   • RLS (restless legs syndrome)    • S/P foot surgery 07/20/2022   • SCC (squamous cell carcinoma) 05/04/2021    left mid forehead   • SCC (squamous cell carcinoma) 2023    chest   • Seasonal allergies    • Shingles 01 05 23   • Sleep apnea     has inspire   • Squamous cell skin cancer 2007    left cheek    • Stroke (HCC)    • Swollen ankles    • 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, left     great toe fusion   • Transaminitis 06/03/2024   • Urinary incontinence    • Urinary tract infection 03/28/2022   • Wears glasses      Past Surgical History:   Procedure Laterality Date   • ABDOMINAL SURGERY  1997,2015,1972    Nissen x2 1972 tubal ligarion   • ARTHROSCOPY KNEE Right    • BREAST BIOPSY      stereotactic-benign   • BREAST BIOPSY      stereo-benign   • BREAST CYST EXCISION Bilateral     benign- done in [place]-neg   • BREAST EXCISIONAL BIOPSY      unknown date-benign   • BREAST EXCISIONAL BIOPSY      unknown date-benign   • BREAST EXCISIONAL BIOPSY      unknown date-benign   • BREAST EXCISIONAL BIOPSY      unknown age-benign   • CARDIAC CATHETERIZATION     • CATARACT EXTRACTION Right    • CHOLECYSTECTOMY     • COLONOSCOPY     • EXAMINATION UNDER ANESTHESIA N/A 06/24/2021    Procedure: EXAM UNDER ANESTHESIA (EUA), DISE;  Surgeon: Gregory Maier MD;  [facility];  Service: ENT   • HERNIA REPAIR      hiatal   • HIATAL HERNIA REPAIR     • KNEE SURGERY Right     Torn maniscus lap surg   • LIPOSUCTION     • LYMPH NODE BIOPSY     • MOHS SURGERY  05/20/2021    left mid forehead-Mickey   • MOHS SURGERY Left 05/27/2021    Left nasal tip- mickey    • ND LIGATION/BIOPSY TEMPORAL ARTERY Left 01/05/2023    Procedure: BIOPSY ARTERY TEMPORAL;  Surgeon: Alec Dennis DO;  Location[facility] Main [facility];  Service: Vascular   • ND OPEN IMPLANTATION CRANIAL NERVE BOOM & PULSE GEN N/A 11/10/2021    Procedure: INSERTION UPPER AIRWAY STIMULATOR, INSPIRE IMPLANT;  Surgeon: Gregory Maier MD;   [facility];  Service: ENT   • SD UNLISTED PROCEDURE FOOT/TOES Right 07/19/2022    Procedure: 1st metatarsal phalangeal joint fusion;  Surgeon: Dede Bonner DPM;  [facility];  Service: Podiatry   • REDUCTION MAMMAPLASTY Bilateral 2000   • REDUCTION MAMMAPLASTY     • SKIN BIOPSY     • SKIN CANCER EXCISION  2007    squamous cell carcinoma    • SKIN CANCER EXCISION  2007    basal cell carcinoma   • SKIN CANCER EXCISION  2023    SCCIS chest   • SQUAMOUS CELL CARCINOMA EXCISION     • TOE SURGERY Right 08/04/2022    Right Great Toe Fusion   • TONSILLECTOMY     • TUBAL LIGATION     • UPPER GASTROINTESTINAL ENDOSCOPY     • US GUIDED BREAST BIOPSY RIGHT COMPLETE Right 07/18/2022     Family History   Problem Relation Age of Onset   • Heart disease Mother    • Depression Mother    • Hypertension Mother    • COPD Mother    • Hearing loss Mother    • Anxiety disorder Mother    • Heart disease Father    • Lung cancer Father 70        Smoker    • Cancer Father         brain   • Alcohol abuse Father    • Dementia Father    • Hypertension Father    • Thyroid disease Father    • COPD Father    • Arthritis Father    • Brain cancer Father 74   • Brain cancer Sister    • Hypertension Sister    • Diabetes Sister    • Heart disease Sister    • Thyroid disease Sister    • Cancer Sister         Lympoma, lung   • Lung cancer Sister 79   • Anxiety disorder Sister    • Migraines Sister    • Hypertension Brother    • Diabetes Brother    • Cancer Brother         Throat   • Dementia Brother    • Stroke Brother    • Hypertension Brother    • Heart disease Brother    • Diabetes Brother    • Hypertension Brother    • Allergies Brother    • No Known Problems Son    • No Known Problems Son    • Brain cancer Paternal Aunt         unknown age   • Breast cancer Neg Hx       reports that she quit smoking about 49 years ago. Her smoking use included cigarettes. She started smoking about 59 years ago. She has a 20 pack-year smoking  history. She has been exposed to tobacco smoke. She has never used smokeless tobacco. She reports current alcohol use. She reports that she does not use drugs.  Current Outpatient Medications on File Prior to Visit   Medication Sig Dispense Refill   • acetaminophen (TYLENOL) 500 mg tablet Take 650 mg by mouth as needed     • amLODIPine (NORVASC) 5 mg tablet TAKE 2 TABLETS BY MOUTH EVERY  tablet 1   • aspirin (ECOTRIN LOW STRENGTH) 81 mg EC tablet Take 1 tablet (81 mg total) by mouth daily Do not start before October 31, 2023.  0   • B-D ULTRAFINE III SHORT PEN 31G X 8 MM MISC   3   • Calcium Citrate 250 MG TABS Take 2 tablets (500 mg total) by mouth in the morning     • Coenzyme Q10 (CoQ10) 100 MG CAPS Take 100 mg by mouth in the morning Bed time     • Cyanocobalamin (Vitamin B12) 1000 MCG TBCR Take 1 tablet (1,000 mcg total) by mouth once a week 12 tablet 0   • escitalopram (LEXAPRO) 10 mg tablet Take 1 tablet (10 mg total) by mouth daily 90 tablet 3   • gabapentin (NEURONTIN) 100 mg capsule Take 1 capsule (100 mg total) by mouth 2 (two) times a day (Patient taking differently: Take 100 mg by mouth 2 (two) times a day as needed (For neuropathy pain)) 60 capsule 0   • hydrALAZINE (APRESOLINE) 25 mg tablet Take 1 tablet (25 mg total) by mouth 2 (two) times a day 180 tablet 3   • Icosapent Ethyl (Vascepa) 0.5 g CAPS Take 2 g by mouth 2 (two) times a day 180 capsule 3   • insulin aspart (NovoLOG) 100 units/mL injection Inject under the skin 3 (three) times a day before meals 5units, 5units, 12units.     • insulin detemir (Levemir FlexTouch) 100 Units/mL injection pen Inject 20 Units under the skin every evening     • levothyroxine 100 mcg tablet TAKE 1 TABLET BY MOUTH EVERY DAY 90 tablet 1   • methenamine hippurate (HIPREX) 1 g tablet Take 1 tablet (1 g total) by mouth 2 (two) times a day with meals 180 tablet 1   • metoprolol tartrate (LOPRESSOR) 50 mg tablet Take 1 tablet (50 mg total) by mouth every 12  (twelve) hours 180 tablet 1   • pantoprazole (PROTONIX) 40 mg tablet Take 1 tablet (40 mg total) by mouth 2 (two) times a day 60 tablet 5   • pramipexole (MIRAPEX) 0.25 mg tablet Take 1 tablet (0.25 mg total) by mouth daily at bedtime 90 tablet 1   • Probiotic Product (PROBIOTIC BLEND PO) Take by mouth     • rosuvastatin (CRESTOR) 5 mg tablet Take 1 tablet (5 mg total) by mouth daily 90 tablet 3   • sucralfate (CARAFATE) 1 g tablet TAKE 1 TABLET (1 G TOTAL) BY MOUTH 3 (THREE) TIMES A DAY WITH MEALS 270 tablet 2   • torsemide (DEMADEX) 10 mg tablet TAKE 10 MG. DAILY EXCEPT MONDAY, WEDNESDAY, FRIDAY 20 MG. (Patient taking differently: Take 10 mg by mouth daily Take 10 mg. Daily except Monday, Wednesday, Friday 20 mg.) 150 tablet 1   • Vitamin D, Cholecalciferol, 25 MCG (1000 UT) TABS Take 2,000 Units by mouth in the morning     • [DISCONTINUED] AMILoride 5 mg tablet Take 1 tablet (5 mg total) by mouth daily 30 tablet 5   • albuterol (Ventolin HFA) 90 mcg/act inhaler Inhale 2 puffs every 6 (six) hours as needed for wheezing 54 g 0   • Icosapent Ethyl (Vascepa) 0.5 g CAPS Take 2 g by mouth 2 (two) times a day 360 capsule 3   • meclizine (ANTIVERT) 12.5 MG tablet Take 1 tablet (12.5 mg total) by mouth 2 (two) times a day (Patient not taking: Reported on 2/11/2025) 6 tablet 0     No current facility-administered medications on file prior to visit.     Allergies   Allergen Reactions   • Ciprofloxacin Hives   • Pollen Extract Allergic Rhinitis   • Sulfamethoxazole-Trimethoprim Tongue Swelling      Current Outpatient Medications on File Prior to Visit   Medication Sig Dispense Refill   • acetaminophen (TYLENOL) 500 mg tablet Take 650 mg by mouth as needed     • amLODIPine (NORVASC) 5 mg tablet TAKE 2 TABLETS BY MOUTH EVERY  tablet 1   • aspirin (ECOTRIN LOW STRENGTH) 81 mg EC tablet Take 1 tablet (81 mg total) by mouth daily Do not start before October 31, 2023.  0   • B-D ULTRAFINE III SHORT PEN 31G X 8 MM MISC   3    • Calcium Citrate 250 MG TABS Take 2 tablets (500 mg total) by mouth in the morning     • Coenzyme Q10 (CoQ10) 100 MG CAPS Take 100 mg by mouth in the morning Bed time     • Cyanocobalamin (Vitamin B12) 1000 MCG TBCR Take 1 tablet (1,000 mcg total) by mouth once a week 12 tablet 0   • escitalopram (LEXAPRO) 10 mg tablet Take 1 tablet (10 mg total) by mouth daily 90 tablet 3   • gabapentin (NEURONTIN) 100 mg capsule Take 1 capsule (100 mg total) by mouth 2 (two) times a day (Patient taking differently: Take 100 mg by mouth 2 (two) times a day as needed (For neuropathy pain)) 60 capsule 0   • hydrALAZINE (APRESOLINE) 25 mg tablet Take 1 tablet (25 mg total) by mouth 2 (two) times a day 180 tablet 3   • Icosapent Ethyl (Vascepa) 0.5 g CAPS Take 2 g by mouth 2 (two) times a day 180 capsule 3   • insulin aspart (NovoLOG) 100 units/mL injection Inject under the skin 3 (three) times a day before meals 5units, 5units, 12units.     • insulin detemir (Levemir FlexTouch) 100 Units/mL injection pen Inject 20 Units under the skin every evening     • levothyroxine 100 mcg tablet TAKE 1 TABLET BY MOUTH EVERY DAY 90 tablet 1   • methenamine hippurate (HIPREX) 1 g tablet Take 1 tablet (1 g total) by mouth 2 (two) times a day with meals 180 tablet 1   • metoprolol tartrate (LOPRESSOR) 50 mg tablet Take 1 tablet (50 mg total) by mouth every 12 (twelve) hours 180 tablet 1   • pantoprazole (PROTONIX) 40 mg tablet Take 1 tablet (40 mg total) by mouth 2 (two) times a day 60 tablet 5   • pramipexole (MIRAPEX) 0.25 mg tablet Take 1 tablet (0.25 mg total) by mouth daily at bedtime 90 tablet 1   • Probiotic Product (PROBIOTIC BLEND PO) Take by mouth     • rosuvastatin (CRESTOR) 5 mg tablet Take 1 tablet (5 mg total) by mouth daily 90 tablet 3   • sucralfate (CARAFATE) 1 g tablet TAKE 1 TABLET (1 G TOTAL) BY MOUTH 3 (THREE) TIMES A DAY WITH MEALS 270 tablet 2   • torsemide (DEMADEX) 10 mg tablet TAKE 10 MG. DAILY EXCEPT MONDAY, WEDNESDAY,  "FRIDAY 20 MG. (Patient taking differently: Take 10 mg by mouth daily Take 10 mg. Daily except Monday, Wednesday, Friday 20 mg.) 150 tablet 1   • Vitamin D, Cholecalciferol, 25 MCG (1000 UT) TABS Take 2,000 Units by mouth in the morning     • [DISCONTINUED] AMILoride 5 mg tablet Take 1 tablet (5 mg total) by mouth daily 30 tablet 5   • albuterol (Ventolin HFA) 90 mcg/act inhaler Inhale 2 puffs every 6 (six) hours as needed for wheezing 54 g 0   • Icosapent Ethyl (Vascepa) 0.5 g CAPS Take 2 g by mouth 2 (two) times a day 360 capsule 3   • meclizine (ANTIVERT) 12.5 MG tablet Take 1 tablet (12.5 mg total) by mouth 2 (two) times a day (Patient not taking: Reported on 2025) 6 tablet 0     No current facility-administered medications on file prior to visit.      Social History     Tobacco Use   • Smoking status: Former     Current packs/day: 0.00     Average packs/day: 2.0 packs/day for 10.0 years (20.0 ttl pk-yrs)     Types: Cigarettes     Start date: 1966     Quit date: 1976     Years since quittin.1     Passive exposure: Past   • Smokeless tobacco: Never   • Tobacco comments:     Smoked 2 pack a day   Vaping Use   • Vaping status: Never Used   Substance and Sexual Activity   • Alcohol use: Yes     Comment: 1 or 2 a year   • Drug use: No   • Sexual activity: Not Currently     Partners: Male     Birth control/protection: Abstinence, Post-menopausal, Female Sterilization     Comment: defer        Objective  Ht 5' 2\" (1.575 m)   Wt 69.8 kg (153 lb 12.8 oz)   BMI 28.13 kg/m²      Physical Exam  BP sitting on right: 124/60 with a heart rate of 56 and regular  BP standing on right: 116/58 with a heart rate of 60 and regular  Physical Exam: General:  No acute distress  Skin:  No acute rash  Eyes:  No scleral icterus and noninjected  ENT:  Moist mucous membranes  Neck:  Supple, no jugular venous distention, trachea midline, overall appearance is normal  Chest:  Clear to auscultation  CVS:  Regular rate and " rhythm, without a rub or gallops  Abdomen:  Normal bowel sounds, soft and nontender and nondistended  Extremities:  No edema, and no cyanosis, no significant arthritic changes  Neuro:  No gross focality  Psych:  Alert and oriented and appropriate

## 2025-02-17 ENCOUNTER — APPOINTMENT (OUTPATIENT)
Dept: LAB | Facility: AMBULARY SURGERY CENTER | Age: 75
End: 2025-02-17
Payer: MEDICARE

## 2025-02-17 ENCOUNTER — TELEPHONE (OUTPATIENT)
Dept: UROLOGY | Facility: AMBULATORY SURGERY CENTER | Age: 75
End: 2025-02-17

## 2025-02-17 DIAGNOSIS — N18.5 TYPE 2 DIABETES MELLITUS WITH STAGE 5 CHRONIC KIDNEY DISEASE NOT ON CHRONIC DIALYSIS, WITH LONG-TERM CURRENT USE OF INSULIN (HCC): ICD-10-CM

## 2025-02-17 DIAGNOSIS — E78.2 MIXED HYPERLIPIDEMIA: ICD-10-CM

## 2025-02-17 DIAGNOSIS — N39.0 RECURRENT UTI: ICD-10-CM

## 2025-02-17 DIAGNOSIS — N18.5 ANEMIA IN STAGE 5 CHRONIC KIDNEY DISEASE, NOT ON CHRONIC DIALYSIS  (HCC): Chronic | ICD-10-CM

## 2025-02-17 DIAGNOSIS — I12.0 BENIGN HYPERTENSIVE KIDNEY DISEASE WITH CHRONIC KIDNEY DISEASE STAGE V OR END STAGE RENAL DISEASE (HCC): ICD-10-CM

## 2025-02-17 DIAGNOSIS — N39.0 RECURRENT UTI: Primary | ICD-10-CM

## 2025-02-17 DIAGNOSIS — E55.9 VITAMIN D DEFICIENCY: Chronic | ICD-10-CM

## 2025-02-17 DIAGNOSIS — E11.22 TYPE 2 DIABETES MELLITUS WITH STAGE 5 CHRONIC KIDNEY DISEASE NOT ON CHRONIC DIALYSIS, WITH LONG-TERM CURRENT USE OF INSULIN (HCC): ICD-10-CM

## 2025-02-17 DIAGNOSIS — Z79.4 TYPE 2 DIABETES MELLITUS WITH STAGE 5 CHRONIC KIDNEY DISEASE NOT ON CHRONIC DIALYSIS, WITH LONG-TERM CURRENT USE OF INSULIN (HCC): ICD-10-CM

## 2025-02-17 DIAGNOSIS — D63.1 ANEMIA IN STAGE 5 CHRONIC KIDNEY DISEASE, NOT ON CHRONIC DIALYSIS  (HCC): Chronic | ICD-10-CM

## 2025-02-17 DIAGNOSIS — N25.81 SECONDARY HYPERPARATHYROIDISM OF RENAL ORIGIN (HCC): Chronic | ICD-10-CM

## 2025-02-17 DIAGNOSIS — R30.0 DYSURIA: ICD-10-CM

## 2025-02-17 DIAGNOSIS — N18.5 STAGE 5 CHRONIC KIDNEY DISEASE NOT ON CHRONIC DIALYSIS (HCC): ICD-10-CM

## 2025-02-17 LAB
ANION GAP SERPL CALCULATED.3IONS-SCNC: 11 MMOL/L (ref 4–13)
BACTERIA UR QL AUTO: ABNORMAL /HPF
BILIRUB UR QL STRIP: NEGATIVE
BUN SERPL-MCNC: 76 MG/DL (ref 5–25)
CALCIUM SERPL-MCNC: 9.7 MG/DL (ref 8.4–10.2)
CHLORIDE SERPL-SCNC: 101 MMOL/L (ref 96–108)
CLARITY UR: CLEAR
CO2 SERPL-SCNC: 24 MMOL/L (ref 21–32)
COLOR UR: ABNORMAL
CREAT SERPL-MCNC: 4.19 MG/DL (ref 0.6–1.3)
GFR SERPL CREATININE-BSD FRML MDRD: 9 ML/MIN/1.73SQ M
GLUCOSE P FAST SERPL-MCNC: 134 MG/DL (ref 65–99)
GLUCOSE UR STRIP-MCNC: NEGATIVE MG/DL
HGB UR QL STRIP.AUTO: NEGATIVE
KETONES UR STRIP-MCNC: NEGATIVE MG/DL
LEUKOCYTE ESTERASE UR QL STRIP: NEGATIVE
NITRITE UR QL STRIP: NEGATIVE
NON-SQ EPI CELLS URNS QL MICRO: ABNORMAL /HPF
PH UR STRIP.AUTO: 6.5 [PH]
POTASSIUM SERPL-SCNC: 4.5 MMOL/L (ref 3.5–5.3)
PROT UR STRIP-MCNC: ABNORMAL MG/DL
RBC #/AREA URNS AUTO: ABNORMAL /HPF
SODIUM SERPL-SCNC: 136 MMOL/L (ref 135–147)
SP GR UR STRIP.AUTO: 1.02 (ref 1–1.03)
UROBILINOGEN UR STRIP-ACNC: <2 MG/DL
WBC #/AREA URNS AUTO: ABNORMAL /HPF

## 2025-02-17 PROCEDURE — 87086 URINE CULTURE/COLONY COUNT: CPT

## 2025-02-17 PROCEDURE — 81001 URINALYSIS AUTO W/SCOPE: CPT

## 2025-02-17 PROCEDURE — 36415 COLL VENOUS BLD VENIPUNCTURE: CPT

## 2025-02-17 PROCEDURE — 80048 BASIC METABOLIC PNL TOTAL CA: CPT

## 2025-02-17 NOTE — TELEPHONE ENCOUNTER
Patient walked into office this morning c/o burning with urination. She has a history of recurrent UTIs, last seen 8/2024. Patient asking if testing can be ordered.    UA and UCX ordered, she knows we will call with results once available.

## 2025-02-18 ENCOUNTER — RESULTS FOLLOW-UP (OUTPATIENT)
Dept: NEPHROLOGY | Facility: CLINIC | Age: 75
End: 2025-02-18

## 2025-02-18 DIAGNOSIS — N18.5 STAGE 5 CHRONIC KIDNEY DISEASE NOT ON CHRONIC DIALYSIS (HCC): Primary | ICD-10-CM

## 2025-02-18 LAB — BACTERIA UR CULT: NORMAL

## 2025-02-18 NOTE — TELEPHONE ENCOUNTER
----- Message from Donaldo Baires MD sent at 2/18/2025  7:24 AM EST -----  Labs are stable will review with her next visit I would just order a BMP right before her visit March 10  ----- Message -----  From: Lab, Background User  Sent: 2/17/2025   4:14 PM EST  To: Donaldo Baires MD

## 2025-02-20 DIAGNOSIS — I10 ESSENTIAL HYPERTENSION: ICD-10-CM

## 2025-02-20 RX ORDER — METOPROLOL TARTRATE 50 MG
50 TABLET ORAL EVERY 12 HOURS
Qty: 180 TABLET | Refills: 1 | Status: SHIPPED | OUTPATIENT
Start: 2025-02-20

## 2025-02-28 PROBLEM — R80.1 PERSISTENT PROTEINURIA: Status: ACTIVE | Noted: 2025-02-28

## 2025-02-28 NOTE — PROGRESS NOTES
Name: Trina Davis      : 1950      MRN: [id number]  Encounter Provider: Donaldo Baires MD  Encounter Date: 3/10/2025   Encounter department: [facility] [place]  :  Assessment & Plan  Stage 5 chronic kidney disease not on chronic dialysis (HCC)  For now patient has decided against any form of dialysis certainly never IHD and even PD at this time she would rather die peacefully.  For now no changes monitor renal function closely not uremic  Orders:    calcium acetate (PHOSLO) capsule; Take 1 capsule (667 mg total) by mouth daily after dinner    Comprehensive metabolic panel; Future    CBC; Future    Magnesium; Future    Phosphorus; Future    Protein / creatinine ratio, urine; Future    PTH, intact; Future    Vitamin D 25 hydroxy; Future    Persistent proteinuria  Continue conservative management  Orders:    calcium acetate (PHOSLO) capsule; Take 1 capsule (667 mg total) by mouth daily after dinner    Comprehensive metabolic panel; Future    CBC; Future    Magnesium; Future    Phosphorus; Future    Protein / creatinine ratio, urine; Future    PTH, intact; Future    Vitamin D 25 hydroxy; Future    Benign hypertensive kidney disease with chronic kidney disease stage V or end stage renal disease (HCC)  Some orthostasis adjusted medications    Orders:    calcium acetate (PHOSLO) capsule; Take 1 capsule (667 mg total) by mouth daily after dinner    Comprehensive metabolic panel; Future    CBC; Future    Magnesium; Future    Phosphorus; Future    Protein / creatinine ratio, urine; Future    PTH, intact; Future    Vitamin D 25 hydroxy; Future    Edema of extremity  Euvolemic  Orders:    calcium acetate (PHOSLO) capsule; Take 1 capsule (667 mg total) by mouth daily after dinner    Comprehensive metabolic panel; Future    CBC; Future    Magnesium; Future    Phosphorus; Future    Protein / creatinine ratio, urine; Future    PTH, intact; Future    Vitamin D 25 hydroxy; Future    Anemia in stage 5  chronic kidney disease, not on chronic dialysis  (HCC)  Hemoglobin stable continue to monitor  Orders:    calcium acetate (PHOSLO) capsule; Take 1 capsule (667 mg total) by mouth daily after dinner    Comprehensive metabolic panel; Future    CBC; Future    Magnesium; Future    Phosphorus; Future    Protein / creatinine ratio, urine; Future    PTH, intact; Future    Vitamin D 25 hydroxy; Future    Secondary hyperparathyroidism of renal origin (HCC)  Acceptable do not overtreat continue current monitoring  Orders:    calcium acetate (PHOSLO) capsule; Take 1 capsule (667 mg total) by mouth daily after dinner    Comprehensive metabolic panel; Future    CBC; Future    Magnesium; Future    Phosphorus; Future    Protein / creatinine ratio, urine; Future    PTH, intact; Future    Vitamin D 25 hydroxy; Future    Mixed hyperlipidemia  Has been at goal no changes  Orders:    calcium acetate (PHOSLO) capsule; Take 1 capsule (667 mg total) by mouth daily after dinner    Comprehensive metabolic panel; Future    CBC; Future    Magnesium; Future    Phosphorus; Future    Protein / creatinine ratio, urine; Future    PTH, intact; Future    Vitamin D 25 hydroxy; Future        History of Present Illness   GARLAND Davis is a 74 y.o. female who presents with follow-up regarding CKD 5  There has been no hospitalizations or acute illnesses since last visit.  The patient overall is feeling well.  Fair appetite has some discomfort after eating followed by GI Dr. Mosqueda and slightly improved energy  No fevers, chills, or cough or colds.  No hematuria, does get some dysuria off and on (negative UA), voiding symptoms unchanged foamy urine  Occasional diarrhea no nausea or vomiting but discomfort after eating please see above  No chest pain, some mild dyspnea on exertion perhaps slightly worse, no leg edema  Some headaches, does get dizziness or lightheadedness upon standing does note a drop in blood pressure upon standing over the last few  months  Both patient and  believes she is actually slightly improved compared to last visit especially less tiredness  Blood pressure medications:  Amlodipine 10 mg daily  Hydralazine 25 mg twice a day  Metoprolol tartrate 50 mg twice a day  Torsemide 10 mg daily in the morning    Renal pertinent medications:  Aspirin 81 mg daily  Calcium citrate 500 mg daily  Gabapentin 100 mg twice a day just as needed  Vascepa 2 g twice a day/Crestor 5 mg daily  Pantoprazole 40 mg twice a day  Carafate 1 g 3 times a day with meals  Vitamin D 2000 units daily      Review of Systems  Please see HPI, otherwise the review of systems as completely reviewed with the patient are negative    Pertinent Medical History     1.  CKD stage 5.:  Etiology:  Diabetic nephropathy/hypertensive nephrosclerosis/arteriolar nephrosclerosis/chronic NSAID use.  No evidence of obstructive uropathy, renal artery disease or primary glomerular disease with a bland urinalysis  Of note, CPK was normal at 105 no evidence rhabdomyolysis.  Baseline creatinine: Most recently approximately 4.0  Current creatinine 4.00 at baseline from 3/5/2025  Urine protein creatinine ratio: 2.9 g higher once again  UA demonstrated no significant hematuria but 3+ proteinuria from  Serologies:  Normal C3/C4; negative rheumatoid factor; negative SPEP:  Negative UPEP;  Light chain ratio 2.09 essentially normal for chronic kidney disease  negative ELOY; negative hepatitis-C; normal IgA; normal ASO; negative RPR     Renal ultrasound no hydronephrosis small bilateral renal simple cyst left upper pole nonobstructing calculi okay        Recommendations:  Treat hypertension-please see below  Treat dyslipidemia-please see below  Maintain proteinuria less than 1 g or as low as possible: Adjust antihypertensive regimen in order to try to get to proteinuria less than 1 g please see below  Avoid nephrotoxic agents such as NSAIDs, patient counseled as such  S/p kidney smart  ACP meeting  completed actually no dialysis however.  Reviewed with the patient as of 3/10/2025: She would never want to do in center hemodialysis PD would be her preference but at this juncture she reviewed it again and would not want dialysis would prefer to die peacefully at that time.       2.  Volume:   Current status:  Euvolemic   Torsemide dose: Currently on torsemide 10 milligrams daily     3.  Hypertension:  Workup:  saline suppression test for primary aldosterone state was normal  plasma free metanephrines was negative  renal artery duplex was negative 02/2019       Current blood pressure averages:   AM: 165/77, standing 140/66  PM: 147/69, standing 129/65  Heart rate: 53        Goal blood pressure:  less than 125/75 given less than 1 g of proteinuria at this time  Recommendations:  Push nonmedical regimen including weight loss, isotonic exercise and avoidance of salt; patient counseled as such  Medication changes today:   Reduce metoprolol to tartrate 25 mg in the morning 50 mg in the evening with mild bradycardia  Decrease amlodipine 5 mg at bedtime  Recheck blood pressures sitting and standing in about 3 to 4 weeks     4.  Electrolytes:   All acceptable including potassium 3.7 bicarbonate 28     5.  Mineral bone disorder:  Calcium/magnesium/phosphorus:  All acceptable except for borderline high phosphorus continue with low phosphorus diet along with PhosLo 1 with largest meal   PTH intact: 88.3 from 1/30/2025 although slightly elevated doing well do not overtreat  Vitamin-D: 30.1 from 4/1/2024 at goal continue to monitor     6.  Dyslipidemia:  Goal LDL:  Less than 100  Current lipid profile: LDL 25/HDL 34/triglycerides 331 from 4/8/24  Recommendations:  Per Endocrinology but essentially at goal        7.  Anemia:   Current hemoglobin 11.5 stablel after recent GI bleeding from 1/30/2025  Followed closely by GI  Studies from 1/30/2025: Saturation 27% ferritin only 28 recommend oral iron 3 days a week      8.  Other  problems:  Depression  Diabetes mellitus per primary medical physician: I would avoid SGLT2 inhibitors at the moment with frequent urinary tract infections  Hypothyroidism  SHAYAN on CPAP  Fibromyalgia/osteoarthritis:  Patient with myoclonic movements, seems related to gabapentin it was adjusted with resolution.  Nephrolithiasis  Basal cell/squamous CA of the skin  Urinary stress incontinence  Status post incisional hernia repairs  hospitalization as outlined below from severe GERD with hypoxemia and shortness of breath from a failed Nissen fundoplication from prior hiatal hernia repair.  Patient is due to have further testing with an EGD by GI and then subsequently thoracic surgery consultation for possible repair(however, according to the patient at this juncture she was felt I risk so no surgery is planned at this time)  In addition, she was placed on Protonix 40 b.i.d. and Pepcid 40 mg hs  Hospitalization on 07/14/2020 secondary confusion at home.  Apparently she had protracted hospital course in [place] following aspiration pneumonia.  Ten days in the ICU on a ventilator.  No overt infectious process.  Low probability for pulmonary embolus despite an elevated D-dimer.  Had mild leukocytosis/SIRS criteria subsequently discharged 1 day later when she clinically improved.  Symptoms were felt secondary to her protracted ICU course.   hospitalization with discharge on 05/18/21:  Epigastric pain following an EGD on 05/13/2021 for Botox injection at the pylorus for treatment for gastroparesis.  Apparently associated with chocolate ingestion and possible cough with aspiration  Complicated by NIKOS with creatinine to 2.6 which improved with IV fluids TO 1.92 ON 05/18/2021   Hospitalization 04/13/2022:  With hypotension fatigue noted to have a low blood pressure mild increasing creatinine, dysuria treated with Bactrim but had an allergic reaction to it as an outpatient she received intravenous ceftriaxone and she was  discharged on doxycycline.  Recently placed on spironolactone for blood pressure all medications were held decide from metoprolol.  Amlodipine was resumed, torsemide re-initiated, but spironolactone was stopped.  Maintain off hydralazine possibly add olmesartan  Creatinine initially as high as 2.27 reduce down to 2.16 upon discharge  Patient admitted 1/7/2023 secondary headache and confusion left temporal symptoms and temporal artery tenderness with an elevated ESR started empirically on steroids per neurology recommendation status post temporal artery biopsy.  However she developed a vesicular rash felt to have herpes zoster ophthalmologists started on Valtrex seen by ophthalmology eyedrops also initiated.  Patient discharged on Valtrex and high-dose gabapentin with follow-up with her primary care physician.  Patient with leukocytosis most compatible with steroids  Admitted 8/19/2023 after recent UTI history of urinary retention and straight catheterizes herself: Admitted with NIKOS from poor oral intake UTI with Serratia given IV fluids placed on ertapenem/change in mental status noted.  Urinary straight catheterizations recommended to be increased by urology.  Torsemide 5 mg every other day.  Admitted 9/9 with change in mental status status post recent discharge earlier in September with UTI.  Seen by geriatrics recommended formal cognitive testing as an outpatient.  ([place] multifactorial from chronic medical conditions?  Underlying dementia): Patient's course complicated by acute kidney injury with a creatinine of 2.5 on admission diuretics were held gentle IV fluids creatinine back to baseline 2.07  Admitted 9/12/2023: COPD exacerbation treated with steroids nebulizers and oxygen.     Hospitalized 10/1/2024: With hematochezia underwent colonoscopy required clipping of Dieulafoy lesion at that time.  Subsequent colonoscopy 9/26 had 2 tubular adenomas removed as well as additional clipping to the lesion is noted.   "Subsequent repeat Ace colonoscopy 9/3... Subsequently 2 more clips were placed reinforced the bleeding in the ascending colon and hemoglobin remained stable.  Abdominal discomfort follow-up with Dr. Mosqueda as of 3/10/2025     GI health maintenance: Please see above 9/30/2024      Medical History Reviewed by provider this encounter:  Allergies  Meds     .        Medical History Reviewed by provider this encounter:     .  Past Medical History   Past Medical History:   Diagnosis Date    Actinic keratosis     Acute blood loss anemia 09/24/2024    Acute cystitis without hematuria 04/28/2023    Acute cystitis without hematuria 04/28/2023    Acute respiratory failure with hypoxia (HCC) 02/15/2024    Acute-on-chronic kidney injury  (HCC)     NIKOS (acute kidney injury) (HCC) 05/08/2020    Allergic     Allergic rhinitis     Ambulatory dysfunction 10/22/2020    AMS (altered mental status) 07/14/2020    Anesthesia     pt reports \"had to use double lumen endo tube for hiatal hernia repair/so surgeon could get to where he needed to work\"    Arthritis     Aspiration into airway     At high risk for falls 07/17/2024    Michael esophagus 1993    Lot of stress with children    Basal cell carcinoma 2007    left cheek     BCC (basal cell carcinoma) 05/27/2021    Left Nasal tip    Breast discharge 06/19/2023    Breast pain 06/19/2023    Cancer (HCC)     squamous cell cancer on forhead    Cancer (HCC)     basal cell on nose     Cholelithiasis     Chronic kidney disease 2000, 2018    Stones, kidney disease stage 4    Chronic pain disorder     bilat feet and joint pain on occas    Colon polyp     Confusion 10/30/2023    Constipation     COPD (chronic obstructive pulmonary disease) (HCC)     COVID-19 08/2021    recovered at home/did receive monoclonal infusion    COVID-19 virus infection 08/16/2021    Dental bridge present     Depression     Diabetes mellitus (HCC)     Type 2    Diabetic neuropathy (HCC)     Difficulty walking 2017    " Disease of thyroid gland     Dyspnea     Elevated liver enzymes 04/04/2023    Epigastric abdominal pain with severe diabetic gastroparesis, status post EGD/Botox injection, 5/14 05/17/2021    Facial abrasion, initial encounter 03/22/2023    Fall 03/22/2023    Family history of thyroid problem     Fatty liver     Fibromyalgia, primary     Fracture of orbital floor, blow-out, right, closed, with routine healing, subsequent encounter 03/22/2023    Fracture of orbital floor, right side, initial encounter for closed fracture (HCC) 03/22/2023    GERD (gastroesophageal reflux disease)     Heart burn     Hiatal hernia     History of cholecystectomy 10/27/2023    History of colon polyps 07/30/2021    History of kidney stones 09/29/2020    Hx of recurrent kidney stones    History of kidney stones 09/29/2020    Hx of recurrent kidney stones  Formatting of this note might be different from the original. Hx of recurrent kidney stones  Last Assessment & Plan:  Formatting of this note might be different from the original. We will set her up for follow-up in 3 months with a KUB prior.  She knows to call if she has any kidney stone type pain in the meantime.    History of Nissen fundoplication 10/27/2023    History of pneumonia     History of repair of hiatal hernia 05/03/2020    Hyperlipidemia     Hyperplasia, parathyroid (HCC)     Hypertension     Hypertensive urgency 10/27/2023    Hyponatremia 04/26/2023    Hypotension 04/13/2022    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 01/06/2023    Kidney problem     Kidney stone     Left hip pain 10/05/2023    Leukocytosis 07/14/2020    Memory loss Julu2 2020    Motion sickness     Motion sickness     Multiple closed fractures of ribs of right side 03/22/2023    Nasal bones, closed fracture 03/22/2023    Neck pain     Obesity 1978    Obesity (BMI 30.0-34.9)     Olecranon bursitis of right elbow 07/29/2024    Other chest pain 09/13/2021    Other fatigue 09/21/2023    Palpitations  07/18/2023    Peripheral neuropathy     Pollen allergies     Preoperative clearance 06/27/2019    Reactive depression 07/17/2024    RLS (restless legs syndrome)     S/P foot surgery 07/20/2022    SCC (squamous cell carcinoma) 05/04/2021    left mid forehead    SCC (squamous cell carcinoma) 2023    chest    Seasonal allergies     Shingles 01 05 23    Sleep apnea     has inspire    Squamous cell skin cancer 2007    left cheek     Stroke (HCC)     Swollen ankles     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, left     great toe fusion    Transaminitis 06/03/2024    Urinary incontinence     Urinary tract infection 03/28/2022    Wears glasses      Past Surgical History:   Procedure Laterality Date    ABDOMINAL SURGERY  1997,2015,1972    Nissen x2 1972 tubal ligarion    ARTHROSCOPY KNEE Right     BREAST BIOPSY      stereotactic-benign    BREAST BIOPSY      stereo-benign    BREAST CYST EXCISION Bilateral     benign- done in [place]-neg    BREAST EXCISIONAL BIOPSY      unknown date-benign    BREAST EXCISIONAL BIOPSY      unknown date-benign    BREAST EXCISIONAL BIOPSY      unknown date-benign    BREAST EXCISIONAL BIOPSY      unknown age-benign    CARDIAC CATHETERIZATION      CATARACT EXTRACTION Right     CHOLECYSTECTOMY      COLONOSCOPY      EXAMINATION UNDER ANESTHESIA N/A 06/24/2021    Procedure: EXAM UNDER ANESTHESIA (EUA), DISE;  Surgeon: Gregory Maier MD;  [facility];  Service: ENT    HERNIA REPAIR      hiatal    HIATAL HERNIA REPAIR      KNEE SURGERY Right     Torn maniscus lap surg    LIPOSUCTION      LYMPH NODE BIOPSY      MOHS SURGERY  05/20/2021    left mid forehead-Mickey    MOHS SURGERY Left 05/27/2021    Left nasal tip- mickey     ND LIGATION/BIOPSY TEMPORAL ARTERY Left 01/05/2023    Procedure: BIOPSY ARTERY TEMPORAL;  Surgeon: Alec Dennis DO;  [facility];  Service: Vascular    ND OPEN IMPLANTATION CRANIAL NERVE BOOM & PULSE GEN N/A 11/10/2021    Procedure: INSERTION UPPER AIRWAY  STIMULATOR, INSPIRE IMPLANT;  Surgeon: Gregory Maier MD;  Location[facility];  Service: ENT    DC UNLISTED PROCEDURE FOOT/TOES Right 07/19/2022    Procedure: 1st metatarsal phalangeal joint fusion;  Surgeon: Dede Bonner DPM;  Location[facility];  Service: Podiatry    REDUCTION MAMMAPLASTY Bilateral 2000    REDUCTION MAMMAPLASTY      SKIN BIOPSY      SKIN CANCER EXCISION  2007    squamous cell carcinoma     SKIN CANCER EXCISION  2007    basal cell carcinoma    SKIN CANCER EXCISION  2023    SCCIS chest    SQUAMOUS CELL CARCINOMA EXCISION      TOE SURGERY Right 08/04/2022    Right Great Toe Fusion    TONSILLECTOMY      TUBAL LIGATION      UPPER GASTROINTESTINAL ENDOSCOPY      US GUIDED BREAST BIOPSY RIGHT COMPLETE Right 07/18/2022     Family History   Problem Relation Age of Onset    Heart disease Mother     Depression Mother     Hypertension Mother     COPD Mother     Hearing loss Mother     Anxiety disorder Mother     Heart disease Father     Lung cancer Father 70        Smoker     Cancer Father         brain    Alcohol abuse Father     Dementia Father     Hypertension Father     Thyroid disease Father     COPD Father     Arthritis Father     Brain cancer Father 74    Brain cancer Sister     Hypertension Sister     Diabetes Sister     Heart disease Sister     Thyroid disease Sister     Cancer Sister         Lympoma, lung    Lung cancer Sister 79    Anxiety disorder Sister     Migraines Sister     Hypertension Brother     Diabetes Brother     Cancer Brother         Throat    Dementia Brother     Stroke Brother     Hypertension Brother     Heart disease Brother     Diabetes Brother     Hypertension Brother     Allergies Brother     No Known Problems Son     No Known Problems Son     Brain cancer Paternal Aunt         unknown age    Breast cancer Neg Hx       reports that she quit smoking about 49 years ago. Her smoking use included cigarettes. She started smoking about 59 years ago. She has a 20 pack-year  smoking history. She has been exposed to tobacco smoke. She has never used smokeless tobacco. She reports current alcohol use. She reports that she does not use drugs.  Current Outpatient Medications   Medication Instructions    acetaminophen (TYLENOL) 650 mg, As needed    albuterol (Ventolin HFA) 90 mcg/act inhaler 2 puffs, Inhalation, Every 6 hours PRN    amLODIPine (NORVASC) 10 mg, Oral, Daily    aspirin (ECOTRIN LOW STRENGTH) 81 mg, Oral, Daily    B-D ULTRAFINE III SHORT PEN 31G X 8 MM MISC     calcium acetate (PHOSLO) 667 mg, Oral, Daily after dinner    Calcium Citrate 500 mg, Oral, Daily    CoQ10 100 mg, Daily    escitalopram (LEXAPRO) 10 mg, Oral, Daily    gabapentin (NEURONTIN) 100 mg, Oral, 2 times daily    hydrALAZINE (APRESOLINE) 25 mg, Oral, 2 times daily    Icosapent Ethyl (VASCEPA) 2 g, Oral, 2 times daily    Icosapent Ethyl (VASCEPA) 2 g, Oral, 2 times daily    insulin aspart (NovoLOG) 100 units/mL injection 3 times daily before meals    Levemir FlexTouch 20 Units, Every evening    levothyroxine 100 mcg, Oral, Daily    meclizine (ANTIVERT) 12.5 mg, Oral, 2 times daily    methenamine hippurate (HIPREX) 1 g, Oral, 2 times daily with meals    metoprolol tartrate (LOPRESSOR) 50 mg, Oral, Every 12 hours    pantoprazole (PROTONIX) 40 mg, Oral, 2 times daily    pramipexole (MIRAPEX) 0.25 mg, Oral, Daily at bedtime    Probiotic Product (PROBIOTIC BLEND PO) Take by mouth    rosuvastatin (CRESTOR) 5 mg, Oral, Daily    sucralfate (CARAFATE) 1 g, Oral, 3 times daily with meals    torsemide (DEMADEX) 10 mg tablet TAKE 10 MG. DAILY EXCEPT MONDAY, WEDNESDAY, FRIDAY 20 MG.    Vitamin B12 1,000 mcg, Oral, Weekly    Vitamin D (Cholecalciferol) 2,000 Units, Daily     Allergies   Allergen Reactions    Ciprofloxacin Hives    Pollen Extract Allergic Rhinitis    Sulfamethoxazole-Trimethoprim Tongue Swelling      Current Outpatient Medications on File Prior to Visit   Medication Sig Dispense Refill    acetaminophen (TYLENOL)  500 mg tablet Take 650 mg by mouth as needed      albuterol (Ventolin HFA) 90 mcg/act inhaler Inhale 2 puffs every 6 (six) hours as needed for wheezing 54 g 0    amLODIPine (NORVASC) 5 mg tablet TAKE 2 TABLETS BY MOUTH EVERY DAY (Patient taking differently: Take 5 mg by mouth daily at bedtime) 180 tablet 1    aspirin (ECOTRIN LOW STRENGTH) 81 mg EC tablet Take 1 tablet (81 mg total) by mouth daily Do not start before October 31, 2023.  0    B-D ULTRAFINE III SHORT PEN 31G X 8 MM MISC   3    Calcium Citrate 250 MG TABS Take 2 tablets (500 mg total) by mouth in the morning      Coenzyme Q10 (CoQ10) 100 MG CAPS Take 100 mg by mouth in the morning Bed time      Cyanocobalamin (Vitamin B12) 1000 MCG TBCR Take 1 tablet (1,000 mcg total) by mouth once a week 12 tablet 0    escitalopram (LEXAPRO) 10 mg tablet Take 1 tablet (10 mg total) by mouth daily 90 tablet 3    gabapentin (NEURONTIN) 100 mg capsule Take 1 capsule (100 mg total) by mouth 2 (two) times a day (Patient taking differently: Take 100 mg by mouth 2 (two) times a day as needed (For neuropathy pain)) 60 capsule 0    hydrALAZINE (APRESOLINE) 25 mg tablet Take 1 tablet (25 mg total) by mouth 2 (two) times a day 180 tablet 3    Icosapent Ethyl (Vascepa) 1 g CAPS Take 2 capsules (2 g total) by mouth 2 (two) times a day 360 capsule 3    insulin aspart (NovoLOG) 100 units/mL injection Inject under the skin 3 (three) times a day before meals 5units, 5units, 12units.      insulin detemir (Levemir FlexTouch) 100 Units/mL injection pen Inject 20 Units under the skin every evening      levothyroxine 100 mcg tablet TAKE 1 TABLET BY MOUTH EVERY DAY 90 tablet 1    methenamine hippurate (HIPREX) 1 g tablet Take 1 tablet (1 g total) by mouth 2 (two) times a day with meals 180 tablet 1    metoprolol tartrate (LOPRESSOR) 50 mg tablet Take 1 tablet (50 mg total) by mouth every 12 (twelve) hours (Patient taking differently: Take 25 mg by mouth every 12 (twelve) hours 25 mg in the  "morning or half a pill in the morning, and a full pill or 50 mg at bedtime) 180 tablet 1    pantoprazole (PROTONIX) 40 mg tablet Take 1 tablet (40 mg total) by mouth 2 (two) times a day 60 tablet 5    pramipexole (MIRAPEX) 0.25 mg tablet Take 1 tablet (0.25 mg total) by mouth daily at bedtime 90 tablet 1    Probiotic Product (PROBIOTIC BLEND PO) Take by mouth      rosuvastatin (CRESTOR) 5 mg tablet Take 1 tablet (5 mg total) by mouth daily 90 tablet 3    sucralfate (CARAFATE) 1 g tablet TAKE 1 TABLET (1 G TOTAL) BY MOUTH 3 (THREE) TIMES A DAY WITH MEALS 270 tablet 2    Vitamin D, Cholecalciferol, 25 MCG (1000 UT) TABS Take 2,000 Units by mouth in the morning      Icosapent Ethyl (Vascepa) 0.5 g CAPS Take 2 g by mouth 2 (two) times a day 360 capsule 3    meclizine (ANTIVERT) 12.5 MG tablet Take 1 tablet (12.5 mg total) by mouth 2 (two) times a day (Patient not taking: Reported on 2025) 6 tablet 0    torsemide (DEMADEX) 10 mg tablet TAKE 10 MG. DAILY EXCEPT MONDAY, WEDNESDAY, FRIDAY 20 MG. (Patient taking differently: Take 10 mg by mouth daily Take 10 mg. Daily except Monday, Wednesday, Friday 20 mg.) 150 tablet 1     No current facility-administered medications on file prior to visit.      Social History     Tobacco Use    Smoking status: Former     Current packs/day: 0.00     Average packs/day: 2.0 packs/day for 10.0 years (20.0 ttl pk-yrs)     Types: Cigarettes     Start date: 1966     Quit date: 1976     Years since quittin.2     Passive exposure: Past    Smokeless tobacco: Never    Tobacco comments:     Smoked 2 pack a day   Vaping Use    Vaping status: Never Used   Substance and Sexual Activity    Alcohol use: Yes     Comment: 1 or 2 a year    Drug use: No    Sexual activity: Not Currently     Partners: Male     Birth control/protection: Abstinence, Post-menopausal, Female Sterilization     Comment: defer        Objective   Ht 5' 2\" (1.575 m)   Wt 68.9 kg (152 lb)   BMI 27.80 kg/m²    "   Physical Exam  BP sitting on right: 150/68 with a heart rate of 52 and regular same on left  BP standing on left: 122/58 with a heart rate of 56 and regular  Physical Exam: General:  No acute distress  Skin:  No acute rash  Eyes:  No scleral icterus and noninjected  ENT:  Moist mucous membranes  Neck:  Supple, no jugular venous distention, trachea midline, overall appearance is normal  Chest:  Clear to auscultation  CVS:  Regular rate and rhythm, without a rub or gallops  Abdomen:  Normal bowel sounds, soft and nontender and nondistended  Extremities:  No edema, and no cyanosis,  significant arthritic changes  Neuro:  No gross focality  Psych:  Alert and oriented and appropriate

## 2025-02-28 NOTE — ASSESSMENT & PLAN NOTE
Some orthostasis adjusted medications    Orders:    calcium acetate (PHOSLO) capsule; Take 1 capsule (667 mg total) by mouth daily after dinner    Comprehensive metabolic panel; Future    CBC; Future    Magnesium; Future    Phosphorus; Future    Protein / creatinine ratio, urine; Future    PTH, intact; Future    Vitamin D 25 hydroxy; Future

## 2025-02-28 NOTE — ASSESSMENT & PLAN NOTE
Hemoglobin stable continue to monitor  Orders:    calcium acetate (PHOSLO) capsule; Take 1 capsule (667 mg total) by mouth daily after dinner    Comprehensive metabolic panel; Future    CBC; Future    Magnesium; Future    Phosphorus; Future    Protein / creatinine ratio, urine; Future    PTH, intact; Future    Vitamin D 25 hydroxy; Future

## 2025-02-28 NOTE — ASSESSMENT & PLAN NOTE
Acceptable do not overtreat continue current monitoring  Orders:    calcium acetate (PHOSLO) capsule; Take 1 capsule (667 mg total) by mouth daily after dinner    Comprehensive metabolic panel; Future    CBC; Future    Magnesium; Future    Phosphorus; Future    Protein / creatinine ratio, urine; Future    PTH, intact; Future    Vitamin D 25 hydroxy; Future

## 2025-02-28 NOTE — ASSESSMENT & PLAN NOTE
Has been at goal no changes  Orders:    calcium acetate (PHOSLO) capsule; Take 1 capsule (667 mg total) by mouth daily after dinner    Comprehensive metabolic panel; Future    CBC; Future    Magnesium; Future    Phosphorus; Future    Protein / creatinine ratio, urine; Future    PTH, intact; Future    Vitamin D 25 hydroxy; Future

## 2025-02-28 NOTE — ASSESSMENT & PLAN NOTE
Euvolemic  Orders:    calcium acetate (PHOSLO) capsule; Take 1 capsule (667 mg total) by mouth daily after dinner    Comprehensive metabolic panel; Future    CBC; Future    Magnesium; Future    Phosphorus; Future    Protein / creatinine ratio, urine; Future    PTH, intact; Future    Vitamin D 25 hydroxy; Future

## 2025-02-28 NOTE — ASSESSMENT & PLAN NOTE
For now patient has decided against any form of dialysis certainly never IHD and even PD at this time she would rather die peacefully.  For now no changes monitor renal function closely not uremic  Orders:    calcium acetate (PHOSLO) capsule; Take 1 capsule (667 mg total) by mouth daily after dinner    Comprehensive metabolic panel; Future    CBC; Future    Magnesium; Future    Phosphorus; Future    Protein / creatinine ratio, urine; Future    PTH, intact; Future    Vitamin D 25 hydroxy; Future

## 2025-02-28 NOTE — ASSESSMENT & PLAN NOTE
Continue conservative management  Orders:    calcium acetate (PHOSLO) capsule; Take 1 capsule (667 mg total) by mouth daily after dinner    Comprehensive metabolic panel; Future    CBC; Future    Magnesium; Future    Phosphorus; Future    Protein / creatinine ratio, urine; Future    PTH, intact; Future    Vitamin D 25 hydroxy; Future

## 2025-03-05 ENCOUNTER — APPOINTMENT (OUTPATIENT)
Dept: LAB | Facility: AMBULARY SURGERY CENTER | Age: 75
End: 2025-03-05
Payer: MEDICARE

## 2025-03-05 DIAGNOSIS — E11.22 TYPE 2 DIABETES MELLITUS WITH STAGE 5 CHRONIC KIDNEY DISEASE NOT ON CHRONIC DIALYSIS, WITH LONG-TERM CURRENT USE OF INSULIN (HCC): ICD-10-CM

## 2025-03-05 DIAGNOSIS — E78.2 MIXED HYPERLIPIDEMIA: ICD-10-CM

## 2025-03-05 DIAGNOSIS — I12.9 BENIGN HYPERTENSIVE KIDNEY DISEASE WITH CHRONIC KIDNEY DISEASE STAGE I THROUGH STAGE IV, OR UNSPECIFIED: ICD-10-CM

## 2025-03-05 DIAGNOSIS — E55.9 VITAMIN D DEFICIENCY: Chronic | ICD-10-CM

## 2025-03-05 DIAGNOSIS — N25.81 SECONDARY HYPERPARATHYROIDISM OF RENAL ORIGIN (HCC): Chronic | ICD-10-CM

## 2025-03-05 DIAGNOSIS — Z79.4 TYPE 2 DIABETES MELLITUS WITH STAGE 5 CHRONIC KIDNEY DISEASE NOT ON CHRONIC DIALYSIS, WITH LONG-TERM CURRENT USE OF INSULIN (HCC): ICD-10-CM

## 2025-03-05 DIAGNOSIS — Z79.4 TYPE 2 DIABETES MELLITUS WITH STAGE 4 CHRONIC KIDNEY DISEASE, WITH LONG-TERM CURRENT USE OF INSULIN (HCC): ICD-10-CM

## 2025-03-05 DIAGNOSIS — I12.0 BENIGN HYPERTENSIVE KIDNEY DISEASE WITH CHRONIC KIDNEY DISEASE STAGE V OR END STAGE RENAL DISEASE (HCC): ICD-10-CM

## 2025-03-05 DIAGNOSIS — N18.5 ANEMIA IN STAGE 5 CHRONIC KIDNEY DISEASE, NOT ON CHRONIC DIALYSIS  (HCC): Chronic | ICD-10-CM

## 2025-03-05 DIAGNOSIS — D63.1 ANEMIA IN STAGE 5 CHRONIC KIDNEY DISEASE, NOT ON CHRONIC DIALYSIS  (HCC): Chronic | ICD-10-CM

## 2025-03-05 DIAGNOSIS — N18.5 TYPE 2 DIABETES MELLITUS WITH STAGE 5 CHRONIC KIDNEY DISEASE NOT ON CHRONIC DIALYSIS, WITH LONG-TERM CURRENT USE OF INSULIN (HCC): ICD-10-CM

## 2025-03-05 DIAGNOSIS — N18.5 STAGE 5 CHRONIC KIDNEY DISEASE NOT ON CHRONIC DIALYSIS (HCC): ICD-10-CM

## 2025-03-05 DIAGNOSIS — I10 PRIMARY HYPERTENSION: ICD-10-CM

## 2025-03-05 DIAGNOSIS — E11.22 TYPE 2 DIABETES MELLITUS WITH STAGE 4 CHRONIC KIDNEY DISEASE, WITH LONG-TERM CURRENT USE OF INSULIN (HCC): ICD-10-CM

## 2025-03-05 DIAGNOSIS — N18.4 TYPE 2 DIABETES MELLITUS WITH STAGE 4 CHRONIC KIDNEY DISEASE, WITH LONG-TERM CURRENT USE OF INSULIN (HCC): ICD-10-CM

## 2025-03-05 DIAGNOSIS — E11.21 DIABETIC NEPHROPATHY ASSOCIATED WITH TYPE 2 DIABETES MELLITUS (HCC): ICD-10-CM

## 2025-03-05 LAB
ANION GAP SERPL CALCULATED.3IONS-SCNC: 11 MMOL/L (ref 4–13)
BUN SERPL-MCNC: 74 MG/DL (ref 5–25)
CALCIUM SERPL-MCNC: 9.3 MG/DL (ref 8.4–10.2)
CHLORIDE SERPL-SCNC: 100 MMOL/L (ref 96–108)
CO2 SERPL-SCNC: 28 MMOL/L (ref 21–32)
CREAT SERPL-MCNC: 4 MG/DL (ref 0.6–1.3)
ERYTHROCYTE [DISTWIDTH] IN BLOOD BY AUTOMATED COUNT: 13.2 % (ref 11.6–15.1)
GFR SERPL CREATININE-BSD FRML MDRD: 10 ML/MIN/1.73SQ M
GLUCOSE P FAST SERPL-MCNC: 98 MG/DL (ref 65–99)
HCT VFR BLD AUTO: 35.4 % (ref 34.8–46.1)
HGB BLD-MCNC: 11.5 G/DL (ref 11.5–15.4)
MAGNESIUM SERPL-MCNC: 2.2 MG/DL (ref 1.9–2.7)
MCH RBC QN AUTO: 30.7 PG (ref 26.8–34.3)
MCHC RBC AUTO-ENTMCNC: 32.5 G/DL (ref 31.4–37.4)
MCV RBC AUTO: 94 FL (ref 82–98)
PHOSPHATE SERPL-MCNC: 4.5 MG/DL (ref 2.3–4.1)
PLATELET # BLD AUTO: 219 THOUSANDS/UL (ref 149–390)
PMV BLD AUTO: 11.8 FL (ref 8.9–12.7)
POTASSIUM SERPL-SCNC: 3.7 MMOL/L (ref 3.5–5.3)
RBC # BLD AUTO: 3.75 MILLION/UL (ref 3.81–5.12)
SODIUM SERPL-SCNC: 139 MMOL/L (ref 135–147)
WBC # BLD AUTO: 8.53 THOUSAND/UL (ref 4.31–10.16)

## 2025-03-05 PROCEDURE — 85027 COMPLETE CBC AUTOMATED: CPT

## 2025-03-05 PROCEDURE — 83735 ASSAY OF MAGNESIUM: CPT

## 2025-03-05 PROCEDURE — 80048 BASIC METABOLIC PNL TOTAL CA: CPT

## 2025-03-05 PROCEDURE — 84100 ASSAY OF PHOSPHORUS: CPT

## 2025-03-05 PROCEDURE — 36415 COLL VENOUS BLD VENIPUNCTURE: CPT

## 2025-03-10 ENCOUNTER — OFFICE VISIT (OUTPATIENT)
Dept: NEPHROLOGY | Facility: CLINIC | Age: 75
End: 2025-03-10
Payer: MEDICARE

## 2025-03-10 VITALS — HEIGHT: 62 IN | BODY MASS INDEX: 27.97 KG/M2 | WEIGHT: 152 LBS

## 2025-03-10 DIAGNOSIS — N18.5 ANEMIA IN STAGE 5 CHRONIC KIDNEY DISEASE, NOT ON CHRONIC DIALYSIS  (HCC): Chronic | ICD-10-CM

## 2025-03-10 DIAGNOSIS — N25.81 SECONDARY HYPERPARATHYROIDISM OF RENAL ORIGIN (HCC): Chronic | ICD-10-CM

## 2025-03-10 DIAGNOSIS — I12.0 BENIGN HYPERTENSIVE KIDNEY DISEASE WITH CHRONIC KIDNEY DISEASE STAGE V OR END STAGE RENAL DISEASE (HCC): Primary | ICD-10-CM

## 2025-03-10 DIAGNOSIS — E78.2 MIXED HYPERLIPIDEMIA: ICD-10-CM

## 2025-03-10 DIAGNOSIS — R60.0 EDEMA OF EXTREMITY: ICD-10-CM

## 2025-03-10 DIAGNOSIS — R80.1 PERSISTENT PROTEINURIA: ICD-10-CM

## 2025-03-10 DIAGNOSIS — D63.1 ANEMIA IN STAGE 5 CHRONIC KIDNEY DISEASE, NOT ON CHRONIC DIALYSIS  (HCC): Chronic | ICD-10-CM

## 2025-03-10 DIAGNOSIS — N18.5 STAGE 5 CHRONIC KIDNEY DISEASE NOT ON CHRONIC DIALYSIS (HCC): ICD-10-CM

## 2025-03-10 PROCEDURE — 99214 OFFICE O/P EST MOD 30 MIN: CPT | Performed by: INTERNAL MEDICINE

## 2025-03-10 PROCEDURE — G2211 COMPLEX E/M VISIT ADD ON: HCPCS | Performed by: INTERNAL MEDICINE

## 2025-03-10 RX ORDER — CALCIUM ACETATE 667 MG/1
667 CAPSULE ORAL
Qty: 90 CAPSULE | Refills: 3 | Status: SHIPPED | OUTPATIENT
Start: 2025-03-10

## 2025-03-10 NOTE — PATIENT INSTRUCTIONS
Visit summary:  - Overall kidney labs have leveled off since the beginning of February with an adjustment in your medications and you seem to be feeling somewhat better.  We are going to make some changes on your blood pressure medications that may also make you even feel better!  - Your blood pressure does drop upon standing so I am going to make the adjustments please see below:    If you start feeling any different or poorly with any symptoms please contact me at that time!    1. Medication changes today:  Please decrease metoprolol to tartrate to a half a pill in the morning or 25 mg and 50 mg in the evening  Please decrease amlodipine to just 5 mg at bedtime  Also begin PhosLo/calcium acetate 667 mg with your largest meal or dinnertime    2.  General instructions:  Please avoid salt  Please try to follow a modestly low phosphorus diet        3.  Please take 1 week a blood pressure readings 3 to 4 weeks after making the above medication change using your left arm sitting and standing with heart rate    AS FOLLOWS  MORNING AND EVENING, SITTING AND STANDING as follows:  TAKE THE MORNING READINGS BEFORE ANY MEDICATIONS AND WHEN YOU ARE RELAXED FOR SEVERAL MINUTES  TAKE THE EVENING READINGS:  BETWEEN 7-10 P.M.; PRIOR TO ANY MEDICATIONS; AT LEAST IN OUR  FROM DINNER; AND CERTAINLY AFTER RELAXING FOR A FEW MINUTES  PLEASE INCLUDE HEART RATE WITH YOUR BLOOD PRESSURE READINGS  When taking standing readings, keep your arm supported at heart level and not dangling  Make sure you are sitting with your back supported and feet on the ground and do not cross your legs or feet  Make sure you have not taken any coffee or caffeine products or exercised or smoke cigarettes at least 30 minutes before taking your blood pressure  Then please mail these readings into the office            4.  Follow-up in 3-4 months  Please bring in 1 week a blood pressure readings morning evening, sitting and standing is outlined  above    Please go for fasting lab work 1-2 weeks prior to your appointment      5.  General non medical recommendations:  AVOID SALT BUT NOT ADDING AN READING LABELS TO MAKE SURE THERE IS LOW-SALT IN THE FOOD THAT YOU ARE EATING  Goal is less than 2 g of sodium intake or less than 5 g of sodium chloride intake per day    Avoid nonsteroidal anti-inflammatory drugs such as Naprosyn, ibuprofen, Aleve, Advil, Celebrex, Meloxicam (Mobic) etc.  You can use Tylenol as needed if you do not have any liver condition to be concerned about    Avoid medications such as Sudafed or decongestants and antihistamines that contained the D component which is the decongestant.  You can take antihistamines without the decongestant or D component.    Try to avoid medications such as pantoprazole or  Protonix/Nexium or Esomeprazole)/Prilosec or omeprazole/Prevacid or lansoprazole/AcipHex or Rabeprazole.  If you are able to, use Pepcid as this is safer for your kidneys.    Try to exercise at least 30 minutes 3 days a week to begin with with an ultimate goal of 5 days a week for at least 30 minutes    Please do not drink more than 2 glasses of alcohol/wine on a daily basis as this may contribute to your high blood pressure.

## 2025-03-10 NOTE — LETTER
March 10, 2025     Tree Aguilar MD  [address] [place] [id number]    Patient: Trina Davis   YOB: 1950   Date of Visit: 3/10/2025       Dear Dr. Aguilar:    Thank you for referring Trina Davis to me for evaluation. Below are my notes for this consultation.    If you have questions, please do not hesitate to call me. I look forward to following your patient along with you.         Sincerely,        Donaldo Baires MD        CC: No Recipients    Donaldo Baires MD  3/10/2025  9:30 AM  Sign when Signing Visit  Name: Trina Davis      : 1950      MRN: [id number]  Encounter Provider: Donaldo Baires MD  Encounter Date: 3/10/2025   Encounter department: [facility] [place]  :  Assessment & Plan  Stage 5 chronic kidney disease not on chronic dialysis (HCC)  For now patient has decided against any form of dialysis certainly never IHD and even PD at this time she would rather die peacefully.  For now no changes monitor renal function closely not uremic  Orders:  •  calcium acetate (PHOSLO) capsule; Take 1 capsule (667 mg total) by mouth daily after dinner  •  Comprehensive metabolic panel; Future  •  CBC; Future  •  Magnesium; Future  •  Phosphorus; Future  •  Protein / creatinine ratio, urine; Future  •  PTH, intact; Future  •  Vitamin D 25 hydroxy; Future    Persistent proteinuria  Continue conservative management  Orders:  •  calcium acetate (PHOSLO) capsule; Take 1 capsule (667 mg total) by mouth daily after dinner  •  Comprehensive metabolic panel; Future  •  CBC; Future  •  Magnesium; Future  •  Phosphorus; Future  •  Protein / creatinine ratio, urine; Future  •  PTH, intact; Future  •  Vitamin D 25 hydroxy; Future    Benign hypertensive kidney disease with chronic kidney disease stage V or end stage renal disease (HCC)  Some orthostasis adjusted medications    Orders:  •  calcium acetate (PHOSLO) capsule; Take 1 capsule (667 mg total) by mouth daily after dinner  •   Comprehensive metabolic panel; Future  •  CBC; Future  •  Magnesium; Future  •  Phosphorus; Future  •  Protein / creatinine ratio, urine; Future  •  PTH, intact; Future  •  Vitamin D 25 hydroxy; Future    Edema of extremity  Euvolemic  Orders:  •  calcium acetate (PHOSLO) capsule; Take 1 capsule (667 mg total) by mouth daily after dinner  •  Comprehensive metabolic panel; Future  •  CBC; Future  •  Magnesium; Future  •  Phosphorus; Future  •  Protein / creatinine ratio, urine; Future  •  PTH, intact; Future  •  Vitamin D 25 hydroxy; Future    Anemia in stage 5 chronic kidney disease, not on chronic dialysis  (HCC)  Hemoglobin stable continue to monitor  Orders:  •  calcium acetate (PHOSLO) capsule; Take 1 capsule (667 mg total) by mouth daily after dinner  •  Comprehensive metabolic panel; Future  •  CBC; Future  •  Magnesium; Future  •  Phosphorus; Future  •  Protein / creatinine ratio, urine; Future  •  PTH, intact; Future  •  Vitamin D 25 hydroxy; Future    Secondary hyperparathyroidism of renal origin (HCC)  Acceptable do not overtreat continue current monitoring  Orders:  •  calcium acetate (PHOSLO) capsule; Take 1 capsule (667 mg total) by mouth daily after dinner  •  Comprehensive metabolic panel; Future  •  CBC; Future  •  Magnesium; Future  •  Phosphorus; Future  •  Protein / creatinine ratio, urine; Future  •  PTH, intact; Future  •  Vitamin D 25 hydroxy; Future    Mixed hyperlipidemia  Has been at goal no changes  Orders:  •  calcium acetate (PHOSLO) capsule; Take 1 capsule (667 mg total) by mouth daily after dinner  •  Comprehensive metabolic panel; Future  •  CBC; Future  •  Magnesium; Future  •  Phosphorus; Future  •  Protein / creatinine ratio, urine; Future  •  PTH, intact; Future  •  Vitamin D 25 hydroxy; Future        History of Present Illness  HPI  Trina Davis is a 74 y.o. female who presents with follow-up regarding CKD 5  There has been no hospitalizations or acute illnesses since last  visit.  The patient overall is feeling well.  Fair appetite has some discomfort after eating followed by GI Dr. Mosqueda and slightly improved energy  No fevers, chills, or cough or colds.  No hematuria, does get some dysuria off and on (negative UA), voiding symptoms unchanged foamy urine  Occasional diarrhea no nausea or vomiting but discomfort after eating please see above  No chest pain, some mild dyspnea on exertion perhaps slightly worse, no leg edema  Some headaches, does get dizziness or lightheadedness upon standing does note a drop in blood pressure upon standing over the last few months  Both patient and  believes she is actually slightly improved compared to last visit especially less tiredness  Blood pressure medications:  Amlodipine 10 mg daily  Hydralazine 25 mg twice a day  Metoprolol tartrate 50 mg twice a day  Torsemide 10 mg daily in the morning    Renal pertinent medications:  Aspirin 81 mg daily  Calcium citrate 500 mg daily  Gabapentin 100 mg twice a day just as needed  Vascepa 2 g twice a day/Crestor 5 mg daily  Pantoprazole 40 mg twice a day  Carafate 1 g 3 times a day with meals  Vitamin D 2000 units daily      Review of Systems  Please see HPI, otherwise the review of systems as completely reviewed with the patient are negative    Pertinent Medical History    1.  CKD stage 5.:  Etiology:  Diabetic nephropathy/hypertensive nephrosclerosis/arteriolar nephrosclerosis/chronic NSAID use.  No evidence of obstructive uropathy, renal artery disease or primary glomerular disease with a bland urinalysis  Of note, CPK was normal at 105 no evidence rhabdomyolysis.  Baseline creatinine: Most recently approximately 4.0  Current creatinine 4.00 at baseline from 3/5/2025  Urine protein creatinine ratio: 2.9 g higher once again  UA demonstrated no significant hematuria but 3+ proteinuria from  Serologies:  Normal C3/C4; negative rheumatoid factor; negative SPEP:  Negative UPEP;  Light chain ratio 2.09  essentially normal for chronic kidney disease  negative ELOY; negative hepatitis-C; normal IgA; normal ASO; negative RPR     Renal ultrasound no hydronephrosis small bilateral renal simple cyst left upper pole nonobstructing calculi okay        Recommendations:  Treat hypertension-please see below  Treat dyslipidemia-please see below  Maintain proteinuria less than 1 g or as low as possible: Adjust antihypertensive regimen in order to try to get to proteinuria less than 1 g please see below  Avoid nephrotoxic agents such as NSAIDs, patient counseled as such  S/p kidney smart  ACP meeting completed actually no dialysis however.  Reviewed with the patient as of 3/10/2025: She would never want to do in center hemodialysis PD would be her preference but at this juncture she reviewed it again and would not want dialysis would prefer to die peacefully at that time.       2.  Volume:   Current status:  Euvolemic   Torsemide dose: Currently on torsemide 10 milligrams daily     3.  Hypertension:  Workup:  saline suppression test for primary aldosterone state was normal  plasma free metanephrines was negative  renal artery duplex was negative 02/2019       Current blood pressure averages:   AM: 165/77, standing 140/66  PM: 147/69, standing 129/65  Heart rate: 53        Goal blood pressure:  less than 125/75 given less than 1 g of proteinuria at this time  Recommendations:  Push nonmedical regimen including weight loss, isotonic exercise and avoidance of salt; patient counseled as such  Medication changes today:   Reduce metoprolol to tartrate 25 mg in the morning 50 mg in the evening with mild bradycardia  Decrease amlodipine 5 mg at bedtime  Recheck blood pressures sitting and standing in about 3 to 4 weeks     4.  Electrolytes:   All acceptable including potassium 3.7 bicarbonate 28     5.  Mineral bone disorder:  Calcium/magnesium/phosphorus:  All acceptable except for borderline high phosphorus continue with low phosphorus  diet along with PhosLo 1 with largest meal   PTH intact: 88.3 from 1/30/2025 although slightly elevated doing well do not overtreat  Vitamin-D: 30.1 from 4/1/2024 at goal continue to monitor     6.  Dyslipidemia:  Goal LDL:  Less than 100  Current lipid profile: LDL 25/HDL 34/triglycerides 331 from 4/8/24  Recommendations:  Per Endocrinology but essentially at goal        7.  Anemia:   Current hemoglobin 11.5 stablel after recent GI bleeding from 1/30/2025  Followed closely by GI  Studies from 1/30/2025: Saturation 27% ferritin only 28 recommend oral iron 3 days a week      8.  Other problems:  Depression  Diabetes mellitus per primary medical physician: I would avoid SGLT2 inhibitors at the moment with frequent urinary tract infections  Hypothyroidism  SHAYAN on CPAP  Fibromyalgia/osteoarthritis:  Patient with myoclonic movements, seems related to gabapentin it was adjusted with resolution.  Nephrolithiasis  Basal cell/squamous CA of the skin  Urinary stress incontinence  Status post incisional hernia repairs  hospitalization as outlined below from severe GERD with hypoxemia and shortness of breath from a failed Nissen fundoplication from prior hiatal hernia repair.  Patient is due to have further testing with an EGD by GI and then subsequently thoracic surgery consultation for possible repair(however, according to the patient at this juncture she was felt I risk so no surgery is planned at this time)  In addition, she was placed on Protonix 40 b.i.d. and Pepcid 40 mg hs  Hospitalization on 07/14/2020 secondary confusion at home.  Apparently she had protracted hospital course in [place] following aspiration pneumonia.  Ten days in the ICU on a ventilator.  No overt infectious process.  Low probability for pulmonary embolus despite an elevated D-dimer.  Had mild leukocytosis/SIRS criteria subsequently discharged 1 day later when she clinically improved.  Symptoms were felt secondary to her protracted ICU  course.   hospitalization with discharge on 05/18/21:  Epigastric pain following an EGD on 05/13/2021 for Botox injection at the pylorus for treatment for gastroparesis.  Apparently associated with chocolate ingestion and possible cough with aspiration  Complicated by NIKOS with creatinine to 2.6 which improved with IV fluids TO 1.92 ON 05/18/2021   Hospitalization 04/13/2022:  With hypotension fatigue noted to have a low blood pressure mild increasing creatinine, dysuria treated with Bactrim but had an allergic reaction to it as an outpatient she received intravenous ceftriaxone and she was discharged on doxycycline.  Recently placed on spironolactone for blood pressure all medications were held decide from metoprolol.  Amlodipine was resumed, torsemide re-initiated, but spironolactone was stopped.  Maintain off hydralazine possibly add olmesartan  Creatinine initially as high as 2.27 reduce down to 2.16 upon discharge  Patient admitted 1/7/2023 secondary headache and confusion left temporal symptoms and temporal artery tenderness with an elevated ESR started empirically on steroids per neurology recommendation status post temporal artery biopsy.  However she developed a vesicular rash felt to have herpes zoster ophthalmologists started on Valtrex seen by ophthalmology eyedrops also initiated.  Patient discharged on Valtrex and high-dose gabapentin with follow-up with her primary care physician.  Patient with leukocytosis most compatible with steroids  Admitted 8/19/2023 after recent UTI history of urinary retention and straight catheterizes herself: Admitted with NIKOS from poor oral intake UTI with Serratia given IV fluids placed on ertapenem/change in mental status noted.  Urinary straight catheterizations recommended to be increased by urology.  Torsemide 5 mg every other day.  Admitted 9/9 with change in mental status status post recent discharge earlier in September with UTI.  Seen by geriatrics recommended  "formal cognitive testing as an outpatient.  ([place] multifactorial from chronic medical conditions?  Underlying dementia): Patient's course complicated by acute kidney injury with a creatinine of 2.5 on admission diuretics were held gentle IV fluids creatinine back to baseline 2.07  Admitted 9/12/2023: COPD exacerbation treated with steroids nebulizers and oxygen.     Hospitalized 10/1/2024: With hematochezia underwent colonoscopy required clipping of Dieulafoy lesion at that time.  Subsequent colonoscopy 9/26 had 2 tubular adenomas removed as well as additional clipping to the lesion is noted.  Subsequent repeat Ace colonoscopy 9/3... Subsequently 2 more clips were placed reinforced the bleeding in the ascending colon and hemoglobin remained stable.  Abdominal discomfort follow-up with Dr. Mosqueda as of 3/10/2025     GI health maintenance: Please see above 9/30/2024      Medical History Reviewed by provider this encounter:  Allergies  Meds     .        Medical History Reviewed by provider this encounter:     .  Past Medical History  Past Medical History:   Diagnosis Date   • Actinic keratosis    • Acute blood loss anemia 09/24/2024   • Acute cystitis without hematuria 04/28/2023   • Acute cystitis without hematuria 04/28/2023   • Acute respiratory failure with hypoxia (HCC) 02/15/2024   • Acute-on-chronic kidney injury  (HCC)    • NIKOS (acute kidney injury) (HCC) 05/08/2020   • Allergic    • Allergic rhinitis    • Ambulatory dysfunction 10/22/2020   • AMS (altered mental status) 07/14/2020   • Anesthesia     pt reports \"had to use double lumen endo tube for hiatal hernia repair/so surgeon could get to where he needed to work\"   • Arthritis    • Aspiration into airway    • At high risk for falls 07/17/2024   • Michael esophagus 1993    Lot of stress with children   • Basal cell carcinoma 2007    left cheek    • BCC (basal cell carcinoma) 05/27/2021    Left Nasal tip   • Breast discharge 06/19/2023   • Breast pain " 06/19/2023   • Cancer (HCC)     squamous cell cancer on forhead   • Cancer (HCC)     basal cell on nose    • Cholelithiasis    • Chronic kidney disease 2000, 2018    Stones, kidney disease stage 4   • Chronic pain disorder     bilat feet and joint pain on occas   • Colon polyp    • Confusion 10/30/2023   • Constipation    • COPD (chronic obstructive pulmonary disease) (HCC)    • COVID-19 08/2021    recovered at home/did receive monoclonal infusion   • COVID-19 virus infection 08/16/2021   • Dental bridge present    • Depression    • Diabetes mellitus (HCC)     Type 2   • Diabetic neuropathy (HCC)    • Difficulty walking 2017   • Disease of thyroid gland    • Dyspnea    • Elevated liver enzymes 04/04/2023   • Epigastric abdominal pain with severe diabetic gastroparesis, status post EGD/Botox injection, 5/14 05/17/2021   • Facial abrasion, initial encounter 03/22/2023   • Fall 03/22/2023   • Family history of thyroid problem    • Fatty liver    • Fibromyalgia, primary    • Fracture of orbital floor, blow-out, right, closed, with routine healing, subsequent encounter 03/22/2023   • Fracture of orbital floor, right side, initial encounter for closed fracture (HCC) 03/22/2023   • GERD (gastroesophageal reflux disease)    • Heart burn    • Hiatal hernia    • History of cholecystectomy 10/27/2023   • History of colon polyps 07/30/2021   • History of kidney stones 09/29/2020    Hx of recurrent kidney stones   • History of kidney stones 09/29/2020    Hx of recurrent kidney stones  Formatting of this note might be different from the original. Hx of recurrent kidney stones  Last Assessment & Plan:  Formatting of this note might be different from the original. We will set her up for follow-up in 3 months with a KUB prior.  She knows to call if she has any kidney stone type pain in the meantime.   • History of Nissen fundoplication 10/27/2023   • History of pneumonia    • History of repair of hiatal hernia 05/03/2020   •  Hyperlipidemia    • Hyperplasia, parathyroid (HCC)    • Hypertension    • Hypertensive urgency 10/27/2023   • Hyponatremia 04/26/2023   • Hypotension 04/13/2022   • 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 01/06/2023   • Kidney problem    • Kidney stone    • Left hip pain 10/05/2023   • Leukocytosis 07/14/2020   • Memory loss Julu2 2020   • Motion sickness    • Motion sickness    • Multiple closed fractures of ribs of right side 03/22/2023   • Nasal bones, closed fracture 03/22/2023   • Neck pain    • Obesity 1978   • Obesity (BMI 30.0-34.9)    • Olecranon bursitis of right elbow 07/29/2024   • Other chest pain 09/13/2021   • Other fatigue 09/21/2023   • Palpitations 07/18/2023   • Peripheral neuropathy    • Pollen allergies    • Preoperative clearance 06/27/2019   • Reactive depression 07/17/2024   • RLS (restless legs syndrome)    • S/P foot surgery 07/20/2022   • SCC (squamous cell carcinoma) 05/04/2021    left mid forehead   • SCC (squamous cell carcinoma) 2023    chest   • Seasonal allergies    • Shingles 01 05 23   • Sleep apnea     has inspire   • Squamous cell skin cancer 2007    left cheek    • Stroke (HCC)    • Swollen ankles    • 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, left     great toe fusion   • Transaminitis 06/03/2024   • Urinary incontinence    • Urinary tract infection 03/28/2022   • Wears glasses      Past Surgical History:   Procedure Laterality Date   • ABDOMINAL SURGERY  1997,2015,1972    Nissen x2 1972 tubal ligarion   • ARTHROSCOPY KNEE Right    • BREAST BIOPSY      stereotactic-benign   • BREAST BIOPSY      stereo-benign   • BREAST CYST EXCISION Bilateral     benign- done in [place]-neg   • BREAST EXCISIONAL BIOPSY      unknown date-benign   • BREAST EXCISIONAL BIOPSY      unknown date-benign   • BREAST EXCISIONAL BIOPSY      unknown date-benign   • BREAST EXCISIONAL BIOPSY      unknown age-benign   • CARDIAC CATHETERIZATION     • CATARACT EXTRACTION Right    • CHOLECYSTECTOMY     •  COLONOSCOPY     • EXAMINATION UNDER ANESTHESIA N/A 06/24/2021    Procedure: EXAM UNDER ANESTHESIA (EUA), DISE;  Surgeon: Gregory Maier MD;  [facility];  Service: ENT   • HERNIA REPAIR      hiatal   • HIATAL HERNIA REPAIR     • KNEE SURGERY Right     Torn maniscus lap surg   • LIPOSUCTION     • LYMPH NODE BIOPSY     • MOHS SURGERY  05/20/2021    left mid forehead-Mickey   • MOHS SURGERY Left 05/27/2021    Left nasal tip- mickey    • MO LIGATION/BIOPSY TEMPORAL ARTERY Left 01/05/2023    Procedure: BIOPSY ARTERY TEMPORAL;  Surgeon: Alec Dennis DO;  [facility];  Service: Vascular   • MO OPEN IMPLANTATION CRANIAL NERVE BOOM & PULSE GEN N/A 11/10/2021    Procedure: INSERTION UPPER AIRWAY STIMULATOR, INSPIRE IMPLANT;  Surgeon: Gregory Maier MD;  [facility];  Service: ENT   • MO UNLISTED PROCEDURE FOOT/TOES Right 07/19/2022    Procedure: 1st metatarsal phalangeal joint fusion;  Surgeon: Dede Bonner DPM;  [facility];  Service: Podiatry   • REDUCTION MAMMAPLASTY Bilateral 2000   • REDUCTION MAMMAPLASTY     • SKIN BIOPSY     • SKIN CANCER EXCISION  2007    squamous cell carcinoma    • SKIN CANCER EXCISION  2007    basal cell carcinoma   • SKIN CANCER EXCISION  2023    SCCIS chest   • SQUAMOUS CELL CARCINOMA EXCISION     • TOE SURGERY Right 08/04/2022    Right Great Toe Fusion   • TONSILLECTOMY     • TUBAL LIGATION     • UPPER GASTROINTESTINAL ENDOSCOPY     • US GUIDED BREAST BIOPSY RIGHT COMPLETE Right 07/18/2022     Family History   Problem Relation Age of Onset   • Heart disease Mother    • Depression Mother    • Hypertension Mother    • COPD Mother    • Hearing loss Mother    • Anxiety disorder Mother    • Heart disease Father    • Lung cancer Father 70        Smoker    • Cancer Father         brain   • Alcohol abuse Father    • Dementia Father    • Hypertension Father    • Thyroid disease Father    • COPD Father    • Arthritis Father    • Brain cancer Father 74   •  Brain cancer Sister    • Hypertension Sister    • Diabetes Sister    • Heart disease Sister    • Thyroid disease Sister    • Cancer Sister         Lympoma, lung   • Lung cancer Sister 79   • Anxiety disorder Sister    • Migraines Sister    • Hypertension Brother    • Diabetes Brother    • Cancer Brother         Throat   • Dementia Brother    • Stroke Brother    • Hypertension Brother    • Heart disease Brother    • Diabetes Brother    • Hypertension Brother    • Allergies Brother    • No Known Problems Son    • No Known Problems Son    • Brain cancer Paternal Aunt         unknown age   • Breast cancer Neg Hx       reports that she quit smoking about 49 years ago. Her smoking use included cigarettes. She started smoking about 59 years ago. She has a 20 pack-year smoking history. She has been exposed to tobacco smoke. She has never used smokeless tobacco. She reports current alcohol use. She reports that she does not use drugs.  Current Outpatient Medications   Medication Instructions   • acetaminophen (TYLENOL) 650 mg, As needed   • albuterol (Ventolin HFA) 90 mcg/act inhaler 2 puffs, Inhalation, Every 6 hours PRN   • amLODIPine (NORVASC) 10 mg, Oral, Daily   • aspirin (ECOTRIN LOW STRENGTH) 81 mg, Oral, Daily   • B-D ULTRAFINE III SHORT PEN 31G X 8 MM MISC    • calcium acetate (PHOSLO) 667 mg, Oral, Daily after dinner   • Calcium Citrate 500 mg, Oral, Daily   • CoQ10 100 mg, Daily   • escitalopram (LEXAPRO) 10 mg, Oral, Daily   • gabapentin (NEURONTIN) 100 mg, Oral, 2 times daily   • hydrALAZINE (APRESOLINE) 25 mg, Oral, 2 times daily   • Icosapent Ethyl (VASCEPA) 2 g, Oral, 2 times daily   • Icosapent Ethyl (VASCEPA) 2 g, Oral, 2 times daily   • insulin aspart (NovoLOG) 100 units/mL injection 3 times daily before meals   • Levemir FlexTouch 20 Units, Every evening   • levothyroxine 100 mcg, Oral, Daily   • meclizine (ANTIVERT) 12.5 mg, Oral, 2 times daily   • methenamine hippurate (HIPREX) 1 g, Oral, 2 times daily  with meals   • metoprolol tartrate (LOPRESSOR) 50 mg, Oral, Every 12 hours   • pantoprazole (PROTONIX) 40 mg, Oral, 2 times daily   • pramipexole (MIRAPEX) 0.25 mg, Oral, Daily at bedtime   • Probiotic Product (PROBIOTIC BLEND PO) Take by mouth   • rosuvastatin (CRESTOR) 5 mg, Oral, Daily   • sucralfate (CARAFATE) 1 g, Oral, 3 times daily with meals   • torsemide (DEMADEX) 10 mg tablet TAKE 10 MG. DAILY EXCEPT MONDAY, WEDNESDAY, FRIDAY 20 MG.   • Vitamin B12 1,000 mcg, Oral, Weekly   • Vitamin D (Cholecalciferol) 2,000 Units, Daily     Allergies   Allergen Reactions   • Ciprofloxacin Hives   • Pollen Extract Allergic Rhinitis   • Sulfamethoxazole-Trimethoprim Tongue Swelling      Current Outpatient Medications on File Prior to Visit   Medication Sig Dispense Refill   • acetaminophen (TYLENOL) 500 mg tablet Take 650 mg by mouth as needed     • albuterol (Ventolin HFA) 90 mcg/act inhaler Inhale 2 puffs every 6 (six) hours as needed for wheezing 54 g 0   • amLODIPine (NORVASC) 5 mg tablet TAKE 2 TABLETS BY MOUTH EVERY DAY (Patient taking differently: Take 5 mg by mouth daily at bedtime) 180 tablet 1   • aspirin (ECOTRIN LOW STRENGTH) 81 mg EC tablet Take 1 tablet (81 mg total) by mouth daily Do not start before October 31, 2023.  0   • B-D ULTRAFINE III SHORT PEN 31G X 8 MM MISC   3   • Calcium Citrate 250 MG TABS Take 2 tablets (500 mg total) by mouth in the morning     • Coenzyme Q10 (CoQ10) 100 MG CAPS Take 100 mg by mouth in the morning Bed time     • Cyanocobalamin (Vitamin B12) 1000 MCG TBCR Take 1 tablet (1,000 mcg total) by mouth once a week 12 tablet 0   • escitalopram (LEXAPRO) 10 mg tablet Take 1 tablet (10 mg total) by mouth daily 90 tablet 3   • gabapentin (NEURONTIN) 100 mg capsule Take 1 capsule (100 mg total) by mouth 2 (two) times a day (Patient taking differently: Take 100 mg by mouth 2 (two) times a day as needed (For neuropathy pain)) 60 capsule 0   • hydrALAZINE (APRESOLINE) 25 mg tablet Take 1  tablet (25 mg total) by mouth 2 (two) times a day 180 tablet 3   • Icosapent Ethyl (Vascepa) 1 g CAPS Take 2 capsules (2 g total) by mouth 2 (two) times a day 360 capsule 3   • insulin aspart (NovoLOG) 100 units/mL injection Inject under the skin 3 (three) times a day before meals 5units, 5units, 12units.     • insulin detemir (Levemir FlexTouch) 100 Units/mL injection pen Inject 20 Units under the skin every evening     • levothyroxine 100 mcg tablet TAKE 1 TABLET BY MOUTH EVERY DAY 90 tablet 1   • methenamine hippurate (HIPREX) 1 g tablet Take 1 tablet (1 g total) by mouth 2 (two) times a day with meals 180 tablet 1   • metoprolol tartrate (LOPRESSOR) 50 mg tablet Take 1 tablet (50 mg total) by mouth every 12 (twelve) hours (Patient taking differently: Take 25 mg by mouth every 12 (twelve) hours 25 mg in the morning or half a pill in the morning, and a full pill or 50 mg at bedtime) 180 tablet 1   • pantoprazole (PROTONIX) 40 mg tablet Take 1 tablet (40 mg total) by mouth 2 (two) times a day 60 tablet 5   • pramipexole (MIRAPEX) 0.25 mg tablet Take 1 tablet (0.25 mg total) by mouth daily at bedtime 90 tablet 1   • Probiotic Product (PROBIOTIC BLEND PO) Take by mouth     • rosuvastatin (CRESTOR) 5 mg tablet Take 1 tablet (5 mg total) by mouth daily 90 tablet 3   • sucralfate (CARAFATE) 1 g tablet TAKE 1 TABLET (1 G TOTAL) BY MOUTH 3 (THREE) TIMES A DAY WITH MEALS 270 tablet 2   • Vitamin D, Cholecalciferol, 25 MCG (1000 UT) TABS Take 2,000 Units by mouth in the morning     • Icosapent Ethyl (Vascepa) 0.5 g CAPS Take 2 g by mouth 2 (two) times a day 360 capsule 3   • meclizine (ANTIVERT) 12.5 MG tablet Take 1 tablet (12.5 mg total) by mouth 2 (two) times a day (Patient not taking: Reported on 2/11/2025) 6 tablet 0   • torsemide (DEMADEX) 10 mg tablet TAKE 10 MG. DAILY EXCEPT MONDAY, WEDNESDAY, FRIDAY 20 MG. (Patient taking differently: Take 10 mg by mouth daily Take 10 mg. Daily except Monday, Wednesday, Friday 20  "mg.) 150 tablet 1     No current facility-administered medications on file prior to visit.      Social History     Tobacco Use   • Smoking status: Former     Current packs/day: 0.00     Average packs/day: 2.0 packs/day for 10.0 years (20.0 ttl pk-yrs)     Types: Cigarettes     Start date: 1966     Quit date: 1976     Years since quittin.2     Passive exposure: Past   • Smokeless tobacco: Never   • Tobacco comments:     Smoked 2 pack a day   Vaping Use   • Vaping status: Never Used   Substance and Sexual Activity   • Alcohol use: Yes     Comment: 1 or 2 a year   • Drug use: No   • Sexual activity: Not Currently     Partners: Male     Birth control/protection: Abstinence, Post-menopausal, Female Sterilization     Comment: defer        Objective  Ht 5' 2\" (1.575 m)   Wt 68.9 kg (152 lb)   BMI 27.80 kg/m²      Physical Exam  BP sitting on right: 150/68 with a heart rate of 52 and regular same on left  BP standing on left: 122/58 with a heart rate of 56 and regular  Physical Exam: General:  No acute distress  Skin:  No acute rash  Eyes:  No scleral icterus and noninjected  ENT:  Moist mucous membranes  Neck:  Supple, no jugular venous distention, trachea midline, overall appearance is normal  Chest:  Clear to auscultation  CVS:  Regular rate and rhythm, without a rub or gallops  Abdomen:  Normal bowel sounds, soft and nontender and nondistended  Extremities:  No edema, and no cyanosis,  significant arthritic changes  Neuro:  No gross focality  Psych:  Alert and oriented and appropriate        "

## 2025-03-13 DIAGNOSIS — D63.1 ANEMIA IN STAGE 5 CHRONIC KIDNEY DISEASE, NOT ON CHRONIC DIALYSIS  (HCC): ICD-10-CM

## 2025-03-13 DIAGNOSIS — N18.5 ANEMIA IN STAGE 5 CHRONIC KIDNEY DISEASE, NOT ON CHRONIC DIALYSIS  (HCC): ICD-10-CM

## 2025-03-13 DIAGNOSIS — E55.9 VITAMIN D DEFICIENCY: Chronic | ICD-10-CM

## 2025-03-13 DIAGNOSIS — I12.0 PARENCHYMAL RENAL HYPERTENSION, STAGE 5 CHRONIC KIDNEY DISEASE OR END STAGE RENAL DISEASE (HCC): ICD-10-CM

## 2025-03-13 DIAGNOSIS — E11.21 DIABETIC NEPHROPATHY ASSOCIATED WITH TYPE 2 DIABETES MELLITUS (HCC): ICD-10-CM

## 2025-03-13 DIAGNOSIS — E78.2 MIXED HYPERLIPIDEMIA: ICD-10-CM

## 2025-03-13 DIAGNOSIS — N18.5 CKD (CHRONIC KIDNEY DISEASE) STAGE 5, GFR LESS THAN 15 ML/MIN (HCC): ICD-10-CM

## 2025-03-13 DIAGNOSIS — N25.81 SECONDARY HYPERPARATHYROIDISM OF RENAL ORIGIN (HCC): Chronic | ICD-10-CM

## 2025-03-14 RX ORDER — TORSEMIDE 10 MG/1
TABLET ORAL
Qty: 150 TABLET | Refills: 1 | Status: SHIPPED | OUTPATIENT
Start: 2025-03-14

## 2025-03-17 ENCOUNTER — OFFICE VISIT (OUTPATIENT)
Dept: GASTROENTEROLOGY | Facility: AMBULARY SURGERY CENTER | Age: 75
End: 2025-03-17
Payer: MEDICARE

## 2025-03-17 VITALS
OXYGEN SATURATION: 98 % | HEIGHT: 63 IN | DIASTOLIC BLOOD PRESSURE: 80 MMHG | WEIGHT: 154 LBS | HEART RATE: 54 BPM | BODY MASS INDEX: 27.29 KG/M2 | SYSTOLIC BLOOD PRESSURE: 132 MMHG

## 2025-03-17 DIAGNOSIS — K31.84 GASTROPARESIS DIABETICORUM  (HCC): ICD-10-CM

## 2025-03-17 DIAGNOSIS — K21.9 GASTROESOPHAGEAL REFLUX DISEASE WITHOUT ESOPHAGITIS: Chronic | ICD-10-CM

## 2025-03-17 DIAGNOSIS — K31.7 GASTRIC POLYP: Primary | ICD-10-CM

## 2025-03-17 DIAGNOSIS — E11.43 GASTROPARESIS DIABETICORUM  (HCC): ICD-10-CM

## 2025-03-17 DIAGNOSIS — D62 ACUTE BLOOD LOSS ANEMIA: ICD-10-CM

## 2025-03-17 PROCEDURE — 99214 OFFICE O/P EST MOD 30 MIN: CPT | Performed by: INTERNAL MEDICINE

## 2025-03-17 PROCEDURE — G2211 COMPLEX E/M VISIT ADD ON: HCPCS | Performed by: INTERNAL MEDICINE

## 2025-03-17 RX ORDER — INSULIN LISPRO 100 [IU]/ML
INJECTION, SOLUTION INTRAVENOUS; SUBCUTANEOUS
COMMUNITY

## 2025-03-17 RX ORDER — SODIUM CHLORIDE, SODIUM LACTATE, POTASSIUM CHLORIDE, CALCIUM CHLORIDE 600; 310; 30; 20 MG/100ML; MG/100ML; MG/100ML; MG/100ML
125 INJECTION, SOLUTION INTRAVENOUS CONTINUOUS
OUTPATIENT
Start: 2025-03-17

## 2025-03-17 NOTE — PROGRESS NOTES
Name: Trina Davis      : 1950      MRN: [id number]  Encounter Provider: Osmin Mosqueda MD  Encounter Date: 3/17/2025   Encounter department: [facility]  :  Assessment & Plan  Acute blood loss anemia  -Resolved.  -Most recent hemoglobin was 11.5, hemoglobin has remained stable since 2024.  -No reports of melena or hematochezia.  -Continue avoid NSAIDs.  Orders:    Ambulatory referral to Gastroenterology    Gastric polyp  -Noted to have a hyperplastic gastric polyp on an EGD 1 year ago.  -Given ongoing symptoms of dyspepsia and hyperplastic polyp, would recommend EGD for further evaluation.  -I obtained informed consent from the patient. The risks/benefits/alternatives of the procedure were discussed with the patient. Risks included, but not limited to, infection, bleeding, perforation, injury to organs in the abdomen, missed lesion and incomplete procedure were discussed. Patient was agreeable and electronic consent was signed.  -Recommend clear liquid diet the day before the EGD.    Orders:    EGD; Future    Gastroparesis diabeticorum  (HCC)  -Noted to have severe gastroparesis on gastric emptying study in  with 3% of gastric emptying at 4 hours.  Patient reports symptoms of fullness and nausea occasionally.  -She does not follow gastroparesis diet.  -Recommend eating small meals.  Patient reports that she has noted that when she eats small meals, she does feel better.  -Recommend low fat and low residue diet.  -Tight glycemic control.  -Will hold off on starting on Reglan or Zofran as symptoms do improve with smaller meals.  Lab Results   Component Value Date    HGBA1C 6.0 (H) 10/31/2024            Gastroesophageal reflux disease without esophagitis  -Symptoms appear to be fairly well-controlled with pantoprazole 40 mg twice daily and Carafate.  -Will proceed with EGD, if EGD appears unremarkable, would recommend decreasing the dose of PPI.  -Continue to  "avoid NSAIDs.  -Continue to follow gastroparesis diet.  -Biopsies from EGD in 2022 without any evidence of celiac disease or H. pylori.  No evidence of intestinal metaplasia.             History of Present Illness   HPI  Trina Davis is a 74 y.o. 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  Denies any further episodes of melena or hematochezia.  Reports that the diarrhea appears to be better controlled.  Reports that her bowel movements are fairly regular at this time.  Patient does report some indigestion and reports feeling bloated almost immediately after eating, reports some nausea.  Denies vomiting.  No hematemesis, coffee-ground emesis.  No melena.  No unintentional weight loss.  Patient was diagnosed with a severe gastroparesis in 2020.  She was noted to have 3% of gastric emptying at 4 hours.        Review of Systems   Constitutional: Negative.    HENT: Negative.     Eyes: Negative.    Respiratory: Negative.     Cardiovascular: Negative.    Gastrointestinal:         See HPI.   Endocrine: Negative.    Genitourinary: Negative.    Musculoskeletal: Negative.    Skin: Negative.    Allergic/Immunologic: Negative.    Neurological: Negative.    Hematological: Negative.    Psychiatric/Behavioral: Negative.     All other systems reviewed and are negative.         Objective   /80 (BP Location: Left arm, Patient Position: Sitting, Cuff Size: Standard)   Pulse (!) 54   Ht 5' 3\" (1.6 m)   Wt 69.9 kg (154 lb)   SpO2 98%   BMI 27.28 kg/m²      Physical Exam  Vitals and nursing note reviewed.   Constitutional:       General: She is not in acute distress.     Appearance: She is well-developed.   HENT:      Head: Normocephalic and atraumatic.   Eyes:      General: No scleral icterus.     Conjunctiva/sclera: Conjunctivae " normal.   Cardiovascular:      Rate and Rhythm: Normal rate and regular rhythm.      Heart sounds: No murmur heard.  Pulmonary:      Effort: Pulmonary effort is normal. No respiratory distress.      Breath sounds: Normal breath sounds.   Abdominal:      Palpations: Abdomen is soft.      Tenderness: There is no abdominal tenderness.   Musculoskeletal:         General: No swelling.      Cervical back: Neck supple.   Skin:     General: Skin is warm and dry.   Neurological:      Mental Status: She is alert.   Psychiatric:         Mood and Affect: Mood normal.

## 2025-03-17 NOTE — PATIENT INSTRUCTIONS
Scheduled date of EGD(as of today): 5/14/25  Physician performing EGD: Dr. delgadillo  Location of EGD: [facility] hospital  Instructions reviewed with patient by:  lacy KLEIN  Clearances: diabetic

## 2025-03-18 ENCOUNTER — TELEPHONE (OUTPATIENT)
Age: 75
End: 2025-03-18

## 2025-03-18 ENCOUNTER — OFFICE VISIT (OUTPATIENT)
Dept: INTERNAL MEDICINE CLINIC | Facility: CLINIC | Age: 75
End: 2025-03-18
Payer: MEDICARE

## 2025-03-18 VITALS
DIASTOLIC BLOOD PRESSURE: 70 MMHG | BODY MASS INDEX: 27.11 KG/M2 | WEIGHT: 153 LBS | HEIGHT: 63 IN | RESPIRATION RATE: 16 BRPM | SYSTOLIC BLOOD PRESSURE: 138 MMHG | OXYGEN SATURATION: 97 % | HEART RATE: 51 BPM | TEMPERATURE: 98 F

## 2025-03-18 DIAGNOSIS — J30.9 ALLERGIC SINUSITIS: Primary | ICD-10-CM

## 2025-03-18 DIAGNOSIS — I10 ESSENTIAL HYPERTENSION: ICD-10-CM

## 2025-03-18 PROBLEM — K31.7 GASTRIC POLYP: Status: ACTIVE | Noted: 2025-03-18

## 2025-03-18 PROCEDURE — G2211 COMPLEX E/M VISIT ADD ON: HCPCS | Performed by: INTERNAL MEDICINE

## 2025-03-18 PROCEDURE — 99214 OFFICE O/P EST MOD 30 MIN: CPT | Performed by: INTERNAL MEDICINE

## 2025-03-18 RX ORDER — INSULIN DEGLUDEC 100 U/ML
20 INJECTION, SOLUTION SUBCUTANEOUS
COMMUNITY

## 2025-03-18 RX ORDER — DESLORATADINE 5 MG/1
5 TABLET ORAL DAILY
Qty: 15 TABLET | Refills: 0 | Status: SHIPPED | OUTPATIENT
Start: 2025-03-18 | End: 2025-03-26

## 2025-03-18 RX ORDER — METOPROLOL TARTRATE 25 MG/1
25 TABLET, FILM COATED ORAL EVERY 12 HOURS
Qty: 60 TABLET | Refills: 5 | Status: SHIPPED | OUTPATIENT
Start: 2025-03-18

## 2025-03-18 NOTE — ASSESSMENT & PLAN NOTE
-Noted to have a hyperplastic gastric polyp on an EGD 1 year ago.  -Given ongoing symptoms of dyspepsia and hyperplastic polyp, would recommend EGD for further evaluation.  -I obtained informed consent from the patient. The risks/benefits/alternatives of the procedure were discussed with the patient. Risks included, but not limited to, infection, bleeding, perforation, injury to organs in the abdomen, missed lesion and incomplete procedure were discussed. Patient was agreeable and electronic consent was signed.  -Recommend clear liquid diet the day before the EGD.    Orders:    EGD; Future

## 2025-03-18 NOTE — PROGRESS NOTES
Name: Trina Davis      : 1950      MRN: [id number]  Encounter Provider: Maryse Rae MD  Encounter Date: 3/18/2025   Encounter department: [facility]  :  Assessment & Plan  Allergic sinusitis  Complaining of rhinorrhea, congestion, sinus pressure, HA, intermittently productive cough with clear sputum  Tried generic brand of cetrizine from [employer]     Plan-   Will trial Clarinex for now   Netti Pot and Tylenol PRN   If no resolution will trial 3 days of Prednisone  Call office for sx improvement       Orders:    desloratadine (CLARINEX) 5 MG tablet; Take 1 tablet (5 mg total) by mouth daily    Essential hypertension  Bradycardic in office and sx at home   Denies syncope     Plan-   Will trial Lopressor 25mg BID   Orders:    metoprolol tartrate (LOPRESSOR) 25 mg tablet; Take 1 tablet (25 mg total) by mouth every 12 (twelve) hours           History of Present Illness   Ms. Davis is a 75yo F with a PMH of T2DM, CKD5, seasonal allergies who presents to the office complaining of 3 days of rhinorrhea, nasal congestion, sinus pressure, headache, and intermittely productive cough with clear to yellow sputum. Patient denies fevers, chills, CP, SOB, orthopnea, poor po intake, abdominal pain, NVD, edema, dizziness, syncope, neck pain, ear pain, sore throat, dysphagia, or any other sx at this time.        Review of Systems   Constitutional:  Negative for chills and fever.   HENT:  Positive for congestion, rhinorrhea and sinus pressure. Negative for ear pain, postnasal drip, sneezing, sore throat, tinnitus, trouble swallowing and voice change.    Eyes:  Positive for discharge. Negative for pain and visual disturbance.   Respiratory:  Negative for cough, choking, chest tightness, shortness of breath and wheezing.    Cardiovascular:  Negative for chest pain, palpitations and leg swelling.   Gastrointestinal:  Negative for abdominal pain, constipation, diarrhea, nausea and vomiting.  "  Genitourinary:  Negative for difficulty urinating, dysuria, hematuria and urgency.   Musculoskeletal:  Negative for arthralgias and back pain.   Skin:  Negative for color change and rash.   Neurological:  Positive for headaches. Negative for seizures, syncope and weakness.   Psychiatric/Behavioral:  Negative for agitation and confusion.    All other systems reviewed and are negative.      Objective   /70 (BP Location: Left arm, Patient Position: Sitting, Cuff Size: Large)   Pulse (!) 51   Temp 98 °F (36.7 °C) (Tympanic)   Resp 16   Ht 5' 3\" (1.6 m)   Wt 69.4 kg (153 lb)   SpO2 97%   BMI 27.10 kg/m²      Physical Exam  Vitals and nursing note reviewed.   Constitutional:       General: She is not in acute distress.     Appearance: She is well-developed. She is not ill-appearing, toxic-appearing or diaphoretic.   HENT:      Head: Normocephalic and atraumatic.      Nose: Mucosal edema and rhinorrhea present.      Right Sinus: Maxillary sinus tenderness present.      Left Sinus: Maxillary sinus tenderness present.      Mouth/Throat:      Lips: Pink.      Mouth: Mucous membranes are moist.      Pharynx: Oropharynx is clear. Uvula midline. No pharyngeal swelling, oropharyngeal exudate, posterior oropharyngeal erythema, uvula swelling or postnasal drip.      Tonsils: No tonsillar exudate.   Eyes:      Conjunctiva/sclera: Conjunctivae normal.   Cardiovascular:      Rate and Rhythm: Normal rate and regular rhythm.      Pulses: no weak pulses.           Dorsalis pedis pulses are 1+ on the right side and 1+ on the left side.        Posterior tibial pulses are 1+ on the right side and 1+ on the left side.      Heart sounds: No murmur heard.  Pulmonary:      Effort: Pulmonary effort is normal. No respiratory distress.      Breath sounds: Normal breath sounds.   Abdominal:      Palpations: Abdomen is soft.      Tenderness: There is no abdominal tenderness.   Musculoskeletal:         General: No swelling.      " Cervical back: Neck supple.   Feet:      Right foot:      Skin integrity: No ulcer, skin breakdown, erythema, warmth, callus or dry skin.      Left foot:      Skin integrity: No ulcer, skin breakdown, erythema, warmth, callus or dry skin.   Skin:     General: Skin is warm and dry.      Capillary Refill: Capillary refill takes less than 2 seconds.   Neurological:      Mental Status: She is alert.   Psychiatric:         Mood and Affect: Mood normal.     Diabetic Foot Exam    Patient's shoes and socks removed.    Right Foot/Ankle   Right Foot Inspection  Skin Exam: skin normal and skin intact. No dry skin, no warmth, no callus, no erythema, no maceration, no abnormal color, no pre-ulcer, no ulcer and no callus.     Toe Exam: ROM and strength within normal limits.     Sensory   Vibration: intact  Proprioception: intact  Monofilament testing: intact    Vascular  Capillary refills: < 3 seconds  The right DP pulse is 1+. The right PT pulse is 1+.     Left Foot/Ankle  Left Foot Inspection  Skin Exam: skin normal and skin intact. No dry skin, no warmth, no erythema, no maceration, normal color, no pre-ulcer, no ulcer and no callus.     Toe Exam: ROM and strength within normal limits.     Sensory   Vibration: intact  Proprioception: intact  Monofilament testing: intact    Vascular  Capillary refills: < 3 seconds  The left DP pulse is 1+. The left PT pulse is 1+.     Assign Risk Category  No deformity present  No loss of protective sensation  No weak pulses  Risk: 0    Administrative Statements   I have spent a total time of 35 minutes in caring for this patient on the day of the visit/encounter including Diagnostic results, Risks and benefits of tx options, Instructions for management, Documenting in the medical record, Reviewing/placing orders in the medical record (including tests, medications, and/or procedures), and Obtaining or reviewing history  .

## 2025-03-18 NOTE — ASSESSMENT & PLAN NOTE
-Symptoms appear to be fairly well-controlled with pantoprazole 40 mg twice daily and Carafate.  -Will proceed with EGD, if EGD appears unremarkable, would recommend decreasing the dose of PPI.  -Continue to avoid NSAIDs.  -Continue to follow gastroparesis diet.  -Biopsies from EGD in 2022 without any evidence of celiac disease or H. pylori.  No evidence of intestinal metaplasia.

## 2025-03-18 NOTE — ASSESSMENT & PLAN NOTE
-Noted to have severe gastroparesis on gastric emptying study in 2020 with 3% of gastric emptying at 4 hours.  Patient reports symptoms of fullness and nausea occasionally.  -She does not follow gastroparesis diet.  -Recommend eating small meals.  Patient reports that she has noted that when she eats small meals, she does feel better.  -Recommend low fat and low residue diet.  -Tight glycemic control.  -Will hold off on starting on Reglan or Zofran as symptoms do improve with smaller meals.  Lab Results   Component Value Date    HGBA1C 6.0 (H) 10/31/2024

## 2025-03-18 NOTE — TELEPHONE ENCOUNTER
Spoke with patient's spouse, Bruce.  He states patient's kidney health is rapidly declining and Nephrologist does not expect patient to last more than 2-3 months longer.    Family not interested in rescheduling 7/1625 one year follow up appointment which was cancelled via [facility].

## 2025-03-20 ENCOUNTER — DOCUMENTATION (OUTPATIENT)
Dept: INTERNAL MEDICINE CLINIC | Facility: CLINIC | Age: 75
End: 2025-03-20

## 2025-03-20 DIAGNOSIS — J01.00 ACUTE MAXILLARY SINUSITIS, RECURRENCE NOT SPECIFIED: Primary | ICD-10-CM

## 2025-03-20 RX ORDER — PREDNISONE 20 MG/1
20 TABLET ORAL DAILY
Qty: 3 TABLET | Refills: 0 | Status: SHIPPED | OUTPATIENT
Start: 2025-03-20 | End: 2025-03-23

## 2025-03-20 RX ORDER — AMOXICILLIN AND CLAVULANATE POTASSIUM 500; 125 MG/1; MG/1
1 TABLET, FILM COATED ORAL EVERY 24 HOURS
Qty: 7 TABLET | Refills: 0 | Status: SHIPPED | OUTPATIENT
Start: 2025-03-20 | End: 2025-03-27

## 2025-03-21 ENCOUNTER — HOSPITAL ENCOUNTER (EMERGENCY)
Facility: HOSPITAL | Age: 75
Discharge: HOME | End: 2025-03-22
Attending: EMERGENCY MEDICINE
Payer: MEDICARE

## 2025-03-21 ENCOUNTER — TELEPHONE (OUTPATIENT)
Dept: INTERNAL MEDICINE CLINIC | Facility: CLINIC | Age: 75
End: 2025-03-21

## 2025-03-21 ENCOUNTER — APPOINTMENT (EMERGENCY)
Dept: RADIOLOGY | Facility: HOSPITAL | Age: 75
End: 2025-03-21
Payer: MEDICARE

## 2025-03-21 VITALS
OXYGEN SATURATION: 97 % | SYSTOLIC BLOOD PRESSURE: 151 MMHG | DIASTOLIC BLOOD PRESSURE: 80 MMHG | RESPIRATION RATE: 18 BRPM | TEMPERATURE: 97.8 F | HEART RATE: 65 BPM

## 2025-03-21 DIAGNOSIS — R00.2 PALPITATIONS: Primary | ICD-10-CM

## 2025-03-21 LAB
ALBUMIN SERPL BCG-MCNC: 4.3 G/DL (ref 3.5–5)
ALP SERPL-CCNC: 74 U/L (ref 34–104)
ALT SERPL W P-5'-P-CCNC: 93 U/L (ref 7–52)
ANION GAP SERPL CALCULATED.3IONS-SCNC: 13 MMOL/L (ref 4–13)
AST SERPL W P-5'-P-CCNC: 42 U/L (ref 13–39)
BASOPHILS # BLD AUTO: 0.01 THOUSANDS/ÂΜL (ref 0–0.1)
BASOPHILS NFR BLD AUTO: 0 % (ref 0–1)
BILIRUB SERPL-MCNC: 0.3 MG/DL (ref 0.2–1)
BUN SERPL-MCNC: 66 MG/DL (ref 5–25)
CALCIUM SERPL-MCNC: 9.4 MG/DL (ref 8.4–10.2)
CARDIAC TROPONIN I PNL SERPL HS: 5 NG/L (ref ?–50)
CHLORIDE SERPL-SCNC: 96 MMOL/L (ref 96–108)
CO2 SERPL-SCNC: 24 MMOL/L (ref 21–32)
CREAT SERPL-MCNC: 4.26 MG/DL (ref 0.6–1.3)
EOSINOPHIL # BLD AUTO: 0.01 THOUSAND/ÂΜL (ref 0–0.61)
EOSINOPHIL NFR BLD AUTO: 0 % (ref 0–6)
ERYTHROCYTE [DISTWIDTH] IN BLOOD BY AUTOMATED COUNT: 12.8 % (ref 11.6–15.1)
GFR SERPL CREATININE-BSD FRML MDRD: 9 ML/MIN/1.73SQ M
GLUCOSE SERPL-MCNC: 215 MG/DL (ref 65–140)
HCT VFR BLD AUTO: 38.3 % (ref 34.8–46.1)
HGB BLD-MCNC: 13 G/DL (ref 11.5–15.4)
IMM GRANULOCYTES # BLD AUTO: 0.04 THOUSAND/UL (ref 0–0.2)
IMM GRANULOCYTES NFR BLD AUTO: 0 % (ref 0–2)
LYMPHOCYTES # BLD AUTO: 1.12 THOUSANDS/ÂΜL (ref 0.6–4.47)
LYMPHOCYTES NFR BLD AUTO: 11 % (ref 14–44)
MAGNESIUM SERPL-MCNC: 2 MG/DL (ref 1.9–2.7)
MCH RBC QN AUTO: 30.4 PG (ref 26.8–34.3)
MCHC RBC AUTO-ENTMCNC: 33.9 G/DL (ref 31.4–37.4)
MCV RBC AUTO: 90 FL (ref 82–98)
MONOCYTES # BLD AUTO: 0.26 THOUSAND/ÂΜL (ref 0.17–1.22)
MONOCYTES NFR BLD AUTO: 2 % (ref 4–12)
NEUTROPHILS # BLD AUTO: 9.21 THOUSANDS/ÂΜL (ref 1.85–7.62)
NEUTS SEG NFR BLD AUTO: 87 % (ref 43–75)
NRBC BLD AUTO-RTO: 0 /100 WBCS
PHOSPHATE SERPL-MCNC: 3.5 MG/DL (ref 2.3–4.1)
PLATELET # BLD AUTO: 238 THOUSANDS/UL (ref 149–390)
PMV BLD AUTO: 11.9 FL (ref 8.9–12.7)
POTASSIUM SERPL-SCNC: 3.7 MMOL/L (ref 3.5–5.3)
PROT SERPL-MCNC: 7.4 G/DL (ref 6.4–8.4)
RBC # BLD AUTO: 4.28 MILLION/UL (ref 3.81–5.12)
SODIUM SERPL-SCNC: 133 MMOL/L (ref 135–147)
WBC # BLD AUTO: 10.65 THOUSAND/UL (ref 4.31–10.16)

## 2025-03-21 PROCEDURE — 93005 ELECTROCARDIOGRAM TRACING: CPT

## 2025-03-21 PROCEDURE — 83735 ASSAY OF MAGNESIUM: CPT | Performed by: EMERGENCY MEDICINE

## 2025-03-21 PROCEDURE — 36415 COLL VENOUS BLD VENIPUNCTURE: CPT | Performed by: EMERGENCY MEDICINE

## 2025-03-21 PROCEDURE — 99285 EMERGENCY DEPT VISIT HI MDM: CPT

## 2025-03-21 PROCEDURE — 71045 X-RAY EXAM CHEST 1 VIEW: CPT

## 2025-03-21 PROCEDURE — 99285 EMERGENCY DEPT VISIT HI MDM: CPT | Performed by: EMERGENCY MEDICINE

## 2025-03-21 PROCEDURE — 84484 ASSAY OF TROPONIN QUANT: CPT | Performed by: EMERGENCY MEDICINE

## 2025-03-21 PROCEDURE — 85025 COMPLETE CBC W/AUTO DIFF WBC: CPT | Performed by: EMERGENCY MEDICINE

## 2025-03-21 PROCEDURE — 84100 ASSAY OF PHOSPHORUS: CPT | Performed by: EMERGENCY MEDICINE

## 2025-03-21 PROCEDURE — 80053 COMPREHEN METABOLIC PANEL: CPT | Performed by: EMERGENCY MEDICINE

## 2025-03-22 NOTE — DISCHARGE INSTRUCTIONS
Your workup today was largely reassuring.  Your EKG and troponin test do not show any signs of a heart attack.  Your electrolytes such as magnesium, phosphorus, and potassium are within normal limits.  Your chronic kidney disease appears more or less stable.  As we discussed, your symptoms may be a side effect of the steroids that he started taking today.  I would recommend that you discontinue them.  However, if you continue to have symptoms or if you develop chest pain, shortness of breath, or if your symptoms worsen in any way you should call your doctor or return to the emergency department.

## 2025-03-22 NOTE — ED PROVIDER NOTES
Time reflects when diagnosis was documented in both MDM as applicable and the Disposition within this note       Time User Action Codes Description Comment    3/21/2025 11:37 PM Tee Georges Add [R00.2] Palpitations           ED Disposition       ED Disposition   Discharge    Condition   Stable    Date/Time   Fri Mar 21, 2025 11:37 PM    Comment   Trina DUCKWORTH Root discharge to home/self care.                   Assessment & Plan       Medical Decision Making  74-year-old female here for evaluation of palpitations after starting prednisone for sinusitis.  No chest pain or shortness of breath.  She does have fairly advanced chronic kidney disease with baseline creatinine around 4.  Laboratory studies including CBC, metabolic panel, magnesium, phosphorus are generally stable/reassuring.  Mild leukocytosis and hyperglycemia likely related to initiation of steroids today.  Patient has maintained sinus rhythm with rates 60 to 70 bpm throughout her emergency department stay.  Chest x-ray negative for pneumonia or pneumothorax.  On reevaluation, as I was reviewing patient's results she also mentioned to me that she has had some mild intermittent calf pain over the past week.  Still denies any chest pain or shortness of breath.  The discomfort she has had in her calves has been bilateral and she notes that she has been doing more walking than normal.  We discussed the utility of checking D-dimer to rule out DVT or PE.  Unfortunately CTA chest is relatively contraindicated given patient's renal function and lower extremity venous Doppler is not available at this time due to lack of vascular coverage.  She has no unilateral leg swelling, palpable cords, or calf tenderness.  She is not having any chest pain or shortness of breath.  No tachycardia, tachypnea, or hypoxia.  Overall, suspicion for VTE is very low.  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  After discussion of risks and benefits patient would prefer discharge rather than further diagnostic testing at this time.  Recommend that she discontinue prednisone and follow-up with PCP.  We did discuss strict return precautions should she develop chest pain, shortness of breath, leg swelling, or any other new or worsening symptoms.    Amount and/or Complexity of Data Reviewed  Labs: ordered. Decision-making details documented in ED Course.  Radiology: ordered and independent interpretation performed. Decision-making details documented in ED Course.     Details: Chest x-ray negative for pneumonia or pneumothorax.  ECG/medicine tests: ordered and independent interpretation performed. Decision-making details documented in ED Course.     Details: EKG independently interpreted by me: Sinus rhythm with first-degree AV block at rate of 67.  Normal axis and normal intervals, nonspecific ST changes without significant ST elevation or depression to suggest cardiac ischemia.             Medications - No data to display    ED Risk Strat Scores                                                History of Present Illness       Chief Complaint   Patient presents with    Palpitations     Patient was recently prescribed desloratadine and prednisone for allergies. Since taking these meds, she started feeling palpitations, heart racing, intermittent chest pain, sob       Past Medical History:   Diagnosis Date    Actinic keratosis     Acute blood loss anemia 09/24/2024    Acute cystitis without hematuria 04/28/2023    Acute cystitis without hematuria 04/28/2023    Acute respiratory failure with hypoxia (HCC) 02/15/2024    Acute-on-chronic kidney injury  (HCC)     NIKOS (acute kidney injury) (HCC) 05/08/2020    Allergic     Allergic rhinitis     Ambulatory dysfunction 10/22/2020    AMS (altered mental status) 07/14/2020    Anesthesia     pt reports \"had to use double lumen endo tube for hiatal hernia " "repair/so surgeon could get to where he needed to work\"    Arthritis     Aspiration into airway     At high risk for falls 07/17/2024    Michael esophagus 1993    Lot of stress with children    Basal cell carcinoma 2007    left cheek     BCC (basal cell carcinoma) 05/27/2021    Left Nasal tip    Breast discharge 06/19/2023    Breast pain 06/19/2023    Cancer (HCC)     squamous cell cancer on forhead    Cancer (HCC)     basal cell on nose     Cholelithiasis     Chronic kidney disease 2000, 2018    Stones, kidney disease stage 4    Chronic pain disorder     bilat feet and joint pain on occas    Colon polyp     Confusion 10/30/2023    Constipation     COPD (chronic obstructive pulmonary disease) (HCC)     COVID-19 08/2021    recovered at home/did receive monoclonal infusion    COVID-19 virus infection 08/16/2021    Dental bridge present     Depression     Diabetes mellitus (HCC)     Type 2    Diabetic neuropathy (HCC)     Difficulty walking 2017    Disease of thyroid gland     Dyspnea     Elevated liver enzymes 04/04/2023    Epigastric abdominal pain with severe diabetic gastroparesis, status post EGD/Botox injection, 5/14 05/17/2021    Facial abrasion, initial encounter 03/22/2023    Fall 03/22/2023    Family history of thyroid problem     Fatty liver     Fibromyalgia, primary     Fracture of orbital floor, blow-out, right, closed, with routine healing, subsequent encounter 03/22/2023    Fracture of orbital floor, right side, initial encounter for closed fracture (HCC) 03/22/2023    GERD (gastroesophageal reflux disease)     Heart burn     Hiatal hernia     History of cholecystectomy 10/27/2023    History of colon polyps 07/30/2021    History of kidney stones 09/29/2020    Hx of recurrent kidney stones    History of kidney stones 09/29/2020    Hx of recurrent kidney stones  Formatting of this note might be different from the original. Hx of recurrent kidney stones  Last Assessment & Plan:  Formatting of this note " might be different from the original. We will set her up for follow-up in 3 months with a KUB prior.  She knows to call if she has any kidney stone type pain in the meantime.    History of Nissen fundoplication 10/27/2023    History of pneumonia     History of repair of hiatal hernia 05/03/2020    Hyperlipidemia     Hyperplasia, parathyroid (HCC)     Hypertension     Hypertensive urgency 10/27/2023    Hyponatremia 04/26/2023    Hypotension 04/13/2022    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 01/06/2023    Kidney problem     Kidney stone     Left hip pain 10/05/2023    Leukocytosis 07/14/2020    Memory loss Julu2 2020    Motion sickness     Motion sickness     Multiple closed fractures of ribs of right side 03/22/2023    Nasal bones, closed fracture 03/22/2023    Neck pain     Obesity 1978    Obesity (BMI 30.0-34.9)     Olecranon bursitis of right elbow 07/29/2024    Other chest pain 09/13/2021    Other fatigue 09/21/2023    Palpitations 07/18/2023    Peripheral neuropathy     Pollen allergies     Preoperative clearance 06/27/2019    Reactive depression 07/17/2024    RLS (restless legs syndrome)     S/P foot surgery 07/20/2022    SCC (squamous cell carcinoma) 05/04/2021    left mid forehead    SCC (squamous cell carcinoma) 2023    chest    Seasonal allergies     Shingles 01 05 23    Sleep apnea     has inspire    Squamous cell skin cancer 2007    left cheek     Stroke (HCC)     Swollen ankles     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, left     great toe fusion    Transaminitis 06/03/2024    Urinary incontinence     Urinary tract infection 03/28/2022    Wears glasses       Past Surgical History:   Procedure Laterality Date    ABDOMINAL SURGERY  1997,2015,1972    Nissen x2 1972 tubal ligarion    ARTHROSCOPY KNEE Right     BREAST BIOPSY      stereotactic-benign    BREAST BIOPSY      stereo-benign    BREAST CYST EXCISION Bilateral     benign- done in [place]-neg    BREAST EXCISIONAL BIOPSY      unknown  date-benign    BREAST EXCISIONAL BIOPSY      unknown date-benign    BREAST EXCISIONAL BIOPSY      unknown date-benign    BREAST EXCISIONAL BIOPSY      unknown age-benign    CARDIAC CATHETERIZATION      CATARACT EXTRACTION Right     CHOLECYSTECTOMY      COLONOSCOPY      EXAMINATION UNDER ANESTHESIA N/A 06/24/2021    Procedure: EXAM UNDER ANESTHESIA (EUA), DISE;  Surgeon: Gregory Maier MD;  Location: [facility];  Service: ENT    HERNIA REPAIR      hiatal    HIATAL HERNIA REPAIR      KNEE SURGERY Right     Torn maniscus lap surg    LIPOSUCTION      LYMPH NODE BIOPSY      MOHS SURGERY  05/20/2021    left mid forehead-Mickey    MOHS SURGERY Left 05/27/2021    Left nasal tip- mickey     OH LIGATION/BIOPSY TEMPORAL ARTERY Left 01/05/2023    Procedure: BIOPSY ARTERY TEMPORAL;  Surgeon: Alec Dennis DO;  Location[facility];  Service: Vascular    OH OPEN IMPLANTATION CRANIAL NERVE BOOM & PULSE GEN N/A 11/10/2021    Procedure: INSERTION UPPER AIRWAY STIMULATOR, INSPIRE IMPLANT;  Surgeon: Gregory Maier MD;  Location: [facility];  Service: ENT    OH UNLISTED PROCEDURE FOOT/TOES Right 07/19/2022    Procedure: 1st metatarsal phalangeal joint fusion;  Surgeon: Dede Bonner DPM;  Location: [facility];  Service: Podiatry    REDUCTION MAMMAPLASTY Bilateral 2000    REDUCTION MAMMAPLASTY      SKIN BIOPSY      SKIN CANCER EXCISION  2007    squamous cell carcinoma     SKIN CANCER EXCISION  2007    basal cell carcinoma    SKIN CANCER EXCISION  2023    SCCIS chest    SQUAMOUS CELL CARCINOMA EXCISION      TOE SURGERY Right 08/04/2022    Right Great Toe Fusion    TONSILLECTOMY      TUBAL LIGATION      UPPER GASTROINTESTINAL ENDOSCOPY      US GUIDED BREAST BIOPSY RIGHT COMPLETE Right 07/18/2022      Family History   Problem Relation Age of Onset    Heart disease Mother     Depression Mother     Hypertension Mother     COPD Mother     Hearing loss Mother     Anxiety disorder Mother     Heart disease Father     Lung cancer Father  "70        Smoker     Cancer Father         brain    Alcohol abuse Father     Dementia Father     Hypertension Father     Thyroid disease Father     COPD Father     Arthritis Father     Brain cancer Father 74    Brain cancer Sister     Hypertension Sister     Diabetes Sister     Heart disease Sister     Thyroid disease Sister     Cancer Sister         Lympoma, lung    Lung cancer Sister 79    Anxiety disorder Sister     Migraines Sister     Hypertension Brother     Diabetes Brother     Cancer Brother         Throat    Dementia Brother     Stroke Brother     Hypertension Brother     Heart disease Brother     Diabetes Brother     Hypertension Brother     Allergies Brother     No Known Problems Son     No Known Problems Son     Brain cancer Paternal Aunt         unknown age    Breast cancer Neg Hx       Social History     Tobacco Use    Smoking status: Former     Current packs/day: 0.00     Average packs/day: 2.0 packs/day for 10.0 years (20.0 ttl pk-yrs)     Types: Cigarettes     Start date: 1966     Quit date: 1976     Years since quittin.2     Passive exposure: Past    Smokeless tobacco: Never    Tobacco comments:     Smoked 2 pack a day   Vaping Use    Vaping status: Never Used   Substance Use Topics    Alcohol use: Yes     Comment: 1 or 2 a year    Drug use: No      E-Cigarette/Vaping    E-Cigarette Use Never User       E-Cigarette/Vaping Substances    Nicotine No     THC No     CBD No     Flavoring No     Other No     Unknown No       I have reviewed and agree with the history as documented.     Trina is a very pleasant 73 yo F here for evaluation of palpitations.  She states that today she just started taking prednisone for sinusitis and since then has been experiencing a sensation of palpitations which she describes as a \"fluttering\" in her chest.  Says that she is taken prednisone before but it has been many years since the last time and she \"was much healthier then.\"  In the past prednisone has " caused her to have some insomnia and restlessness but she had not experienced palpitations with it before.  Denies any chest pain or shortness of breath.  No fevers, cough, or URI symptoms.  No nausea or vomiting.  No diaphoresis.          Review of Systems   Constitutional:  Negative for chills, fatigue and fever.   HENT:  Negative for sore throat.    Respiratory:  Negative for cough, chest tightness and shortness of breath.    Cardiovascular:  Positive for palpitations. Negative for chest pain.   Gastrointestinal:  Negative for abdominal pain, nausea and vomiting.   Genitourinary:  Negative for dysuria and hematuria.   Musculoskeletal:  Negative for arthralgias and back pain.   Skin:  Negative for color change and rash.   Neurological:  Negative for syncope and light-headedness.   All other systems reviewed and are negative.          Objective       ED Triage Vitals [03/21/25 2052]   Temperature Pulse Blood Pressure Respirations SpO2 Patient Position - Orthostatic VS   97.8 °F (36.6 °C) 70 (!) 193/87 18 98 % Sitting      Temp Source Heart Rate Source BP Location FiO2 (%) Pain Score    Oral Monitor Left arm -- --      Vitals      Date and Time Temp Pulse SpO2 Resp BP Pain Score FACES Pain Rating User   03/21/25 2330 -- 65 97 % 18 151/80 -- -- JY   03/21/25 2052 97.8 °F (36.6 °C) 70 98 % 18 193/87 -- -- MD            Physical Exam  Vitals and nursing note reviewed.   Constitutional:       General: She is not in acute distress.     Appearance: She is well-developed.   HENT:      Head: Normocephalic and atraumatic.      Mouth/Throat:      Mouth: Mucous membranes are moist.   Eyes:      Conjunctiva/sclera: Conjunctivae normal.   Cardiovascular:      Rate and Rhythm: Normal rate and regular rhythm.      Heart sounds: No murmur heard.  Pulmonary:      Effort: Pulmonary effort is normal. No respiratory distress.      Breath sounds: Normal breath sounds.   Abdominal:      Palpations: Abdomen is soft.      Tenderness: There  is no abdominal tenderness.   Musculoskeletal:      Cervical back: Neck supple.   Skin:     General: Skin is warm and dry.      Capillary Refill: Capillary refill takes less than 2 seconds.   Neurological:      General: No focal deficit present.      Mental Status: She is alert and oriented to person, place, and time.   Psychiatric:         Mood and Affect: Mood normal.         Results Reviewed       Procedure Component Value Units Date/Time    HS Troponin I 4hr [[id number]]     Lab Status: No result Specimen: Blood     HS Troponin 0hr (reflex protocol) [[id number]]  (Normal) Collected: 03/21/25 2207    Lab Status: Final result Specimen: Blood from Arm, Right Updated: 03/21/25 2238     hs TnI 0hr 5 ng/L     HS Troponin I 2hr [[id number]]     Lab Status: No result Specimen: Blood     Comprehensive metabolic panel [[id number]]  (Abnormal) Collected: 03/21/25 2207    Lab Status: Final result Specimen: Blood from Arm, Right Updated: 03/21/25 2237     Sodium 133 mmol/L      Potassium 3.7 mmol/L      Chloride 96 mmol/L      CO2 24 mmol/L      ANION GAP 13 mmol/L      BUN 66 mg/dL      Creatinine 4.26 mg/dL      Glucose 215 mg/dL      Calcium 9.4 mg/dL      AST 42 U/L      ALT 93 U/L      Alkaline Phosphatase 74 U/L      Total Protein 7.4 g/dL      Albumin 4.3 g/dL      Total Bilirubin 0.30 mg/dL      eGFR 9 ml/min/1.73sq m     Narrative:      National Kid[employer] Foundation guidelines for Chronic Kidney Disease (CKD):     Stage 1 with normal or high GFR (GFR > 90 mL/min/1.73 square meters)    Stage 2 Mild CKD (GFR = 60-89 mL/min/1.73 square meters)    Stage 3A Moderate CKD (GFR = 45-59 mL/min/1.73 square meters)    Stage 3B Moderate CKD (GFR = 30-44 mL/min/1.73 square meters)    Stage 4 Severe CKD (GFR = 15-29 mL/min/1.73 square meters)    Stage 5 End Stage CKD (GFR <15 mL/min/1.73 square meters)  Note: GFR calculation is accurate only with a steady state creatinine    Magnesium [[id number]]  (Normal) Collected: 03/21/25  2207    Lab Status: Final result Specimen: Blood from Arm, Right Updated: 03/21/25 2237     Magnesium 2.0 mg/dL     Phosphorus [[id number]]  (Normal) Collected: 03/21/25 2207    Lab Status: Final result Specimen: Blood from Arm, Right Updated: 03/21/25 2237     Phosphorus 3.5 mg/dL     CBC and differential [[id number]]  (Abnormal) Collected: 03/21/25 2207    Lab Status: Final result Specimen: Blood from Arm, Right Updated: 03/21/25 2222     WBC 10.65 Thousand/uL      RBC 4.28 Million/uL      Hemoglobin 13.0 g/dL      Hematocrit 38.3 %      MCV 90 fL      MCH 30.4 pg      MCHC 33.9 g/dL      RDW 12.8 %      MPV 11.9 fL      Platelets 238 Thousands/uL      nRBC 0 /100 WBCs      Segmented % 87 %      Immature Grans % 0 %      Lymphocytes % 11 %      Monocytes % 2 %      Eosinophils Relative 0 %      Basophils Relative 0 %      Absolute Neutrophils 9.21 Thousands/µL      Absolute Immature Grans 0.04 Thousand/uL      Absolute Lymphocytes 1.12 Thousands/µL      Absolute Monocytes 0.26 Thousand/µL      Eosinophils Absolute 0.01 Thousand/µL      Basophils Absolute 0.01 Thousands/µL             XR chest 1 view portable    (Results Pending)       Procedures    ED Medication and Procedure Management   Prior to Admission Medications   Prescriptions Last Dose Informant Patient Reported? Taking?   B-D ULTRAFINE III SHORT PEN 31G X 8 MM MISC  Self, Spouse/Significant Other Yes No   Calcium Citrate 250 MG TABS  Self No No   Sig: Take 2 tablets (500 mg total) by mouth in the morning   Coenzyme Q10 (CoQ10) 100 MG CAPS  Self, Spouse/Significant Other Yes No   Sig: Take 100 mg by mouth in the morning Bed time   Cyanocobalamin (Vitamin B12) 1000 MCG TBCR   No No   Sig: Take 1 tablet (1,000 mcg total) by mouth once a week   Icosapent Ethyl (Vascepa) 0.5 g CAPS   No No   Sig: Take 2 g by mouth 2 (two) times a day   Icosapent Ethyl (Vascepa) 1 g CAPS   No No   Sig: Take 2 capsules (2 g total) by mouth 2 (two) times a day   Probiotic Product  (PROBIOTIC BLEND PO)  Self Yes No   Sig: Take by mouth   Vitamin D, Cholecalciferol, 25 MCG (1000 UT) TABS  Self Yes No   Sig: Take 2,000 Units by mouth in the morning   acetaminophen (TYLENOL) 500 mg tablet  Self Yes No   Sig: Take 650 mg by mouth as needed   albuterol (Ventolin HFA) 90 mcg/act inhaler  Self, Spouse/Significant Other No No   Sig: Inhale 2 puffs every 6 (six) hours as needed for wheezing   amLODIPine (NORVASC) 5 mg tablet   No No   Sig: TAKE 2 TABLETS BY MOUTH EVERY DAY   amoxicillin-clavulanate (AUGMENTIN) 500-125 mg per tablet   No No   Sig: Take 1 tablet by mouth every 24 hours for 7 days   aspirin (ECOTRIN LOW STRENGTH) 81 mg EC tablet  Self, Spouse/Significant Other No No   Sig: Take 1 tablet (81 mg total) by mouth daily Do not start before October 31, 2023.   calcium acetate (PHOSLO) capsule   No No   Sig: Take 1 capsule (667 mg total) by mouth daily after dinner   desloratadine (CLARINEX) 5 MG tablet   No No   Sig: Take 1 tablet (5 mg total) by mouth daily   escitalopram (LEXAPRO) 10 mg tablet   No No   Sig: Take 1 tablet (10 mg total) by mouth daily   gabapentin (NEURONTIN) 100 mg capsule   No No   Sig: Take 1 capsule (100 mg total) by mouth 2 (two) times a day   hydrALAZINE (APRESOLINE) 25 mg tablet   No No   Sig: Take 1 tablet (25 mg total) by mouth 2 (two) times a day   insulin aspart (NovoLOG) 100 units/mL injection  Self, Spouse/Significant Other Yes No   Sig: Inject under the skin 3 (three) times a day before meals 5units, 5units, 12units.   insulin degludec (Tresiba FlexTouch) 100 units/mL injection pen   Yes No   Sig: Inject 20 Units under the skin daily at bedtime   insulin detemir (Levemir FlexTouch) 100 Units/mL injection pen  Self, Spouse/Significant Other Yes No   Sig: Inject 20 Units under the skin every evening   Patient not taking: Reported on 3/17/2025   insulin lispro (HumaLOG) 100 units/mL injection   Yes No   Sig: Inject under the skin   Patient not taking: Reported on  3/17/2025   levothyroxine 100 mcg tablet   No No   Sig: TAKE 1 TABLET BY MOUTH EVERY DAY   methenamine hippurate (HIPREX) 1 g tablet   No No   Sig: Take 1 tablet (1 g total) by mouth 2 (two) times a day with meals   metoprolol tartrate (LOPRESSOR) 25 mg tablet   No No   Sig: Take 1 tablet (25 mg total) by mouth every 12 (twelve) hours   pantoprazole (PROTONIX) 40 mg tablet   No No   Sig: Take 1 tablet (40 mg total) by mouth 2 (two) times a day   pramipexole (MIRAPEX) 0.25 mg tablet   No No   Sig: Take 1 tablet (0.25 mg total) by mouth daily at bedtime   predniSONE 20 mg tablet   No No   Sig: Take 1 tablet (20 mg total) by mouth daily for 3 days   rosuvastatin (CRESTOR) 5 mg tablet  Self, Spouse/Significant Other No No   Sig: Take 1 tablet (5 mg total) by mouth daily   sucralfate (CARAFATE) 1 g tablet   No No   Sig: TAKE 1 TABLET (1 G TOTAL) BY MOUTH 3 (THREE) TIMES A DAY WITH MEALS   torsemide (DEMADEX) 10 mg tablet   No No   Sig: Take 10 mg. Daily except Monday, Wednesday, Friday 20 mg.      Facility-Administered Medications: None     Discharge Medication List as of 3/21/2025 11:39 PM        CONTINUE these medications which have NOT CHANGED    Details   acetaminophen (TYLENOL) 500 mg tablet Take 650 mg by mouth as needed, Historical Med      albuterol (Ventolin HFA) 90 mcg/act inhaler Inhale 2 puffs every 6 (six) hours as needed for wheezing, Starting Thu 8/4/2022, Normal      amLODIPine (NORVASC) 5 mg tablet TAKE 2 TABLETS BY MOUTH EVERY DAY, Starting Fri 1/10/2025, Normal      amoxicillin-clavulanate (AUGMENTIN) 500-125 mg per tablet Take 1 tablet by mouth every 24 hours for 7 days, Starting Thu 3/20/2025, Until Thu 3/27/2025, Normal      aspirin (ECOTRIN LOW STRENGTH) 81 mg EC tablet Take 1 tablet (81 mg total) by mouth daily Do not start before October 31, 2023., Starting Tue 10/31/2023, No Print      B-D ULTRAFINE III SHORT PEN 31G X 8 MM MISC Historical Med      calcium acetate (PHOSLO) capsule Take 1 capsule  (667 mg total) by mouth daily after dinner, Starting Mon 3/10/2025, Normal      Calcium Citrate 250 MG TABS Take 2 tablets (500 mg total) by mouth in the morning, Starting Wed 7/17/2024, No Print      Coenzyme Q10 (CoQ10) 100 MG CAPS Take 100 mg by mouth in the morning Bed time, Historical Med      Cyanocobalamin (Vitamin B12) 1000 MCG TBCR Take 1 tablet (1,000 mcg total) by mouth once a week, Starting Mon 1/20/2025, Normal      desloratadine (CLARINEX) 5 MG tablet Take 1 tablet (5 mg total) by mouth daily, Starting Tue 3/18/2025, Normal      escitalopram (LEXAPRO) 10 mg tablet Take 1 tablet (10 mg total) by mouth daily, Starting Mon 10/14/2024, Until Thu 10/9/2025, Normal      gabapentin (NEURONTIN) 100 mg capsule Take 1 capsule (100 mg total) by mouth 2 (two) times a day, Starting Wed 12/4/2024, Normal      hydrALAZINE (APRESOLINE) 25 mg tablet Take 1 tablet (25 mg total) by mouth 2 (two) times a day, Starting Fri 11/8/2024, Normal      !! Icosapent Ethyl (Vascepa) 0.5 g CAPS Take 2 g by mouth 2 (two) times a day, Starting Wed 1/8/2025, Normal      !! Icosapent Ethyl (Vascepa) 1 g CAPS Take 2 capsules (2 g total) by mouth 2 (two) times a day, Starting Tue 2/11/2025, Normal      insulin aspart (NovoLOG) 100 units/mL injection Inject under the skin 3 (three) times a day before meals 5units, 5units, 12units., Historical Med      insulin degludec (Tresiba FlexTouch) 100 units/mL injection pen Inject 20 Units under the skin daily at bedtime, Historical Med      insulin detemir (Levemir FlexTouch) 100 Units/mL injection pen Inject 20 Units under the skin every evening, Starting Tue 6/8/2021, Historical Med      insulin lispro (HumaLOG) 100 units/mL injection Inject under the skin, Historical Med      levothyroxine 100 mcg tablet TAKE 1 TABLET BY MOUTH EVERY DAY, Starting Tue 10/22/2024, Normal      methenamine hippurate (HIPREX) 1 g tablet Take 1 tablet (1 g total) by mouth 2 (two) times a day with meals, Starting Fri  12/27/2024, Normal      metoprolol tartrate (LOPRESSOR) 25 mg tablet Take 1 tablet (25 mg total) by mouth every 12 (twelve) hours, Starting Tue 3/18/2025, Normal      pantoprazole (PROTONIX) 40 mg tablet Take 1 tablet (40 mg total) by mouth 2 (two) times a day, Starting Mon 1/20/2025, Normal      pramipexole (MIRAPEX) 0.25 mg tablet Take 1 tablet (0.25 mg total) by mouth daily at bedtime, Starting Fri 12/27/2024, Normal      predniSONE 20 mg tablet Take 1 tablet (20 mg total) by mouth daily for 3 days, Starting Thu 3/20/2025, Until Sun 3/23/2025, Normal      Probiotic Product (PROBIOTIC BLEND PO) Take by mouth, Historical Med      rosuvastatin (CRESTOR) 5 mg tablet Take 1 tablet (5 mg total) by mouth daily, Starting Thu 2/22/2024, Normal      sucralfate (CARAFATE) 1 g tablet TAKE 1 TABLET (1 G TOTAL) BY MOUTH 3 (THREE) TIMES A DAY WITH MEALS, Starting Wed 11/20/2024, Normal      torsemide (DEMADEX) 10 mg tablet Take 10 mg. Daily except Monday, Wednesday, Friday 20 mg., Normal      Vitamin D, Cholecalciferol, 25 MCG (1000 UT) TABS Take 2,000 Units by mouth in the morning, Historical Med       !! - Potential duplicate medications found. Please discuss with provider.        No discharge procedures on file.  ED SEPSIS DOCUMENTATION   Time reflects when diagnosis was documented in both MDM as applicable and the Disposition within this note       Time User Action Codes Description Comment    3/21/2025 11:37 PM Tee Georges Add [R00.2] Palpitations                  Tee Georges MD  03/22/25 0134

## 2025-03-24 ENCOUNTER — OFFICE VISIT (OUTPATIENT)
Dept: INTERNAL MEDICINE CLINIC | Facility: CLINIC | Age: 75
End: 2025-03-24
Payer: MEDICARE

## 2025-03-24 ENCOUNTER — TELEPHONE (OUTPATIENT)
Age: 75
End: 2025-03-24

## 2025-03-24 VITALS
RESPIRATION RATE: 20 BRPM | HEART RATE: 59 BPM | BODY MASS INDEX: 26.93 KG/M2 | WEIGHT: 152 LBS | DIASTOLIC BLOOD PRESSURE: 70 MMHG | OXYGEN SATURATION: 99 % | SYSTOLIC BLOOD PRESSURE: 142 MMHG | TEMPERATURE: 97 F

## 2025-03-24 DIAGNOSIS — T56.0X1A ACUTE LEAD-INDUCED GOUT INVOLVING TOE OF LEFT FOOT, INITIAL ENCOUNTER: Primary | ICD-10-CM

## 2025-03-24 DIAGNOSIS — M10.172 ACUTE LEAD-INDUCED GOUT INVOLVING TOE OF LEFT FOOT, INITIAL ENCOUNTER: Primary | ICD-10-CM

## 2025-03-24 LAB
ATRIAL RATE: 64 BPM
ATRIAL RATE: 68 BPM
P AXIS: 34 DEGREES
P AXIS: 35 DEGREES
PR INTERVAL: 224 MS
PR INTERVAL: 238 MS
QRS AXIS: -2 DEGREES
QRS AXIS: -9 DEGREES
QRSD INTERVAL: 90 MS
QRSD INTERVAL: 90 MS
QT INTERVAL: 428 MS
QT INTERVAL: 464 MS
QTC INTERVAL: 455 MS
QTC INTERVAL: 478 MS
T WAVE AXIS: 3 DEGREES
T WAVE AXIS: 7 DEGREES
VENTRICULAR RATE: 64 BPM
VENTRICULAR RATE: 68 BPM

## 2025-03-24 PROCEDURE — 99213 OFFICE O/P EST LOW 20 MIN: CPT

## 2025-03-24 PROCEDURE — G2211 COMPLEX E/M VISIT ADD ON: HCPCS

## 2025-03-24 PROCEDURE — 93010 ELECTROCARDIOGRAM REPORT: CPT | Performed by: INTERNAL MEDICINE

## 2025-03-24 RX ORDER — COLCHICINE 0.6 MG/1
0.6 TABLET ORAL DAILY
Qty: 6 TABLET | Refills: 0 | Status: SHIPPED | OUTPATIENT
Start: 2025-03-25 | End: 2025-03-31

## 2025-03-24 NOTE — PROGRESS NOTES
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  On examination patient has swelling involving the left metatarsal with mild erythema and tenderness.  Will empirically treat for acute gout  Discussed with patient that we will avoid any NSAID given her CKD.   Will treat with colchicine for 5 days  Discussed with patient to call the office if she does not improve by Thursday.       History of Present Illness     HPI  Ms. Davis presents due to acute onset left toe pain. On  she woke up with a lot of pain in her left first metatarsal that is associated with swelling. Denies any inciting trauma or injury. Was seen today at [facility] and was informed that she has gout for which she was prescribed a medrol dose pack. Patient reports that she did not  this medication up due to not tolerating steriods in the past. Deneis any fever, chill, n/v abdominal pain or diarrhea.  Review of Systems   Constitutional:  Negative for chills and fever.   HENT:  Negative for ear pain and sore throat.    Eyes:  Negative for pain and visual disturbance.   Respiratory:  Negative for cough and shortness of breath.    Cardiovascular:  Negative for chest pain and palpitations.   Gastrointestinal:  Negative for abdominal pain and vomiting.   Genitourinary:  Negative for dysuria and hematuria.   Musculoskeletal:  Positive for joint swelling. Negative for arthralgias and back pain.        Left toe pain    Skin:  Negative for color change and rash.   Neurological:  Negative for  "seizures and syncope.   All other systems reviewed and are negative.    Past Medical History:   Diagnosis Date    Actinic keratosis     Acute blood loss anemia 09/24/2024    Acute cystitis without hematuria 04/28/2023    Acute cystitis without hematuria 04/28/2023    Acute respiratory failure with hypoxia (HCC) 02/15/2024    Acute-on-chronic kidney injury  (HCC)     NIKOS (acute kidney injury) (HCC) 05/08/2020    Allergic     Allergic rhinitis     Ambulatory dysfunction 10/22/2020    AMS (altered mental status) 07/14/2020    Anesthesia     pt reports \"had to use double lumen endo tube for hiatal hernia repair/so surgeon could get to where he needed to work\"    Arthritis     Aspiration into airway     At high risk for falls 07/17/2024    Michael esophagus 1993    Lot of stress with children    Basal cell carcinoma 2007    left cheek     BCC (basal cell carcinoma) 05/27/2021    Left Nasal tip    Breast discharge 06/19/2023    Breast pain 06/19/2023    Cancer (HCC)     squamous cell cancer on forhead    Cancer (HCC)     basal cell on nose     Cholelithiasis     Chronic kidney disease 2000, 2018    Stones, kidney disease stage 4    Chronic pain disorder     bilat feet and joint pain on occas    Colon polyp     Confusion 10/30/2023    Constipation     COPD (chronic obstructive pulmonary disease) (HCC)     COVID-19 08/2021    recovered at home/did receive monoclonal infusion    COVID-19 virus infection 08/16/2021    Dental bridge present     Depression     Diabetes mellitus (HCC)     Type 2    Diabetic neuropathy (HCC)     Difficulty walking 2017    Disease of thyroid gland     Dyspnea     Elevated liver enzymes 04/04/2023    Epigastric abdominal pain with severe diabetic gastroparesis, status post EGD/Botox injection, 5/14 05/17/2021    Facial abrasion, initial encounter 03/22/2023    Fall 03/22/2023    Family history of thyroid problem     Fatty liver     Fibromyalgia, primary     Fracture of orbital floor, blow-out, " right, closed, with routine healing, subsequent encounter 03/22/2023    Fracture of orbital floor, right side, initial encounter for closed fracture (HCC) 03/22/2023    GERD (gastroesophageal reflux disease)     Heart burn     Hiatal hernia     History of cholecystectomy 10/27/2023    History of colon polyps 07/30/2021    History of kidney stones 09/29/2020    Hx of recurrent kidney stones    History of kidney stones 09/29/2020    Hx of recurrent kidney stones  Formatting of this note might be different from the original. Hx of recurrent kidney stones  Last Assessment & Plan:  Formatting of this note might be different from the original. We will set her up for follow-up in 3 months with a KUB prior.  She knows to call if she has any kidney stone type pain in the meantime.    History of Nissen fundoplication 10/27/2023    History of pneumonia     History of repair of hiatal hernia 05/03/2020    Hyperlipidemia     Hyperplasia, parathyroid (HCC)     Hypertension     Hypertensive urgency 10/27/2023    Hyponatremia 04/26/2023    Hypotension 04/13/2022    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 01/06/2023    Kidney problem     Kidney stone     Left hip pain 10/05/2023    Leukocytosis 07/14/2020    Memory loss Julu2 2020    Motion sickness     Motion sickness     Multiple closed fractures of ribs of right side 03/22/2023    Nasal bones, closed fracture 03/22/2023    Neck pain     Obesity 1978    Obesity (BMI 30.0-34.9)     Olecranon bursitis of right elbow 07/29/2024    Other chest pain 09/13/2021    Other fatigue 09/21/2023    Palpitations 07/18/2023    Peripheral neuropathy     Pollen allergies     Preoperative clearance 06/27/2019    Reactive depression 07/17/2024    RLS (restless legs syndrome)     S/P foot surgery 07/20/2022    SCC (squamous cell carcinoma) 05/04/2021    left mid forehead    SCC (squamous cell carcinoma) 2023    chest    Seasonal allergies     Shingles 01 05 23    Sleep apnea     has inspire     Squamous cell skin cancer 2007    left cheek     Stroke (HCC)     Swollen ankles     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, left     great toe fusion    Transaminitis 06/03/2024    Urinary incontinence     Urinary tract infection 03/28/2022    Wears glasses      Past Surgical History:   Procedure Laterality Date    ABDOMINAL SURGERY  1997,2015,1972    Nissen x2 1972 tubal ligarion    ARTHROSCOPY KNEE Right     BREAST BIOPSY      stereotactic-benign    BREAST BIOPSY      stereo-benign    BREAST CYST EXCISION Bilateral     benign- done in [place]-neg    BREAST EXCISIONAL BIOPSY      unknown date-benign    BREAST EXCISIONAL BIOPSY      unknown date-benign    BREAST EXCISIONAL BIOPSY      unknown date-benign    BREAST EXCISIONAL BIOPSY      unknown age-benign    CARDIAC CATHETERIZATION      CATARACT EXTRACTION Right     CHOLECYSTECTOMY      COLONOSCOPY      EXAMINATION UNDER ANESTHESIA N/A 06/24/2021    Procedure: EXAM UNDER ANESTHESIA (EUA), DISE;  Surgeon: Gregory Maier MD;  [facility];  Service: ENT    HERNIA REPAIR      hiatal    HIATAL HERNIA REPAIR      KNEE SURGERY Right     Torn maniscus lap surg    LIPOSUCTION      LYMPH NODE BIOPSY      MOHS SURGERY  05/20/2021    left mid forehead-Mickey    MOHS SURGERY Left 05/27/2021    Left nasal tip- mickey     TN LIGATION/BIOPSY TEMPORAL ARTERY Left 01/05/2023    Procedure: BIOPSY ARTERY TEMPORAL;  Surgeon: Alec Dennis DO;  [facility];  Service: Vascular    TN OPEN IMPLANTATION CRANIAL NERVE BOOM & PULSE GEN N/A 11/10/2021    Procedure: INSERTION UPPER AIRWAY STIMULATOR, INSPIRE IMPLANT;  Surgeon: Gregory Maier MD;  Location: [facility];  Service: ENT    TN UNLISTED PROCEDURE FOOT/TOES Right 07/19/2022    Procedure: 1st metatarsal phalangeal joint fusion;  Surgeon: Dede Bonner DPM;  Location: [facility];  Service: Podiatry    REDUCTION MAMMAPLASTY Bilateral 2000    REDUCTION MAMMAPLASTY      SKIN BIOPSY      SKIN CANCER EXCISION       squamous cell carcinoma     SKIN CANCER EXCISION      basal cell carcinoma    SKIN CANCER EXCISION      SCCIS chest    SQUAMOUS CELL CARCINOMA EXCISION      TOE SURGERY Right 2022    Right Great Toe Fusion    TONSILLECTOMY      TUBAL LIGATION      UPPER GASTROINTESTINAL ENDOSCOPY      US GUIDED BREAST BIOPSY RIGHT COMPLETE Right 2022     Family History   Problem Relation Age of Onset    Heart disease Mother     Depression Mother     Hypertension Mother     COPD Mother     Hearing loss Mother     Anxiety disorder Mother     Heart disease Father     Lung cancer Father 70        Smoker     Cancer Father         brain    Alcohol abuse Father     Dementia Father     Hypertension Father     Thyroid disease Father     COPD Father     Arthritis Father     Brain cancer Father 74    Brain cancer Sister     Hypertension Sister     Diabetes Sister     Heart disease Sister     Thyroid disease Sister     Cancer Sister         Lympoma, lung    Lung cancer Sister 79    Anxiety disorder Sister     Migraines Sister     Hypertension Brother     Diabetes Brother     Cancer Brother         Throat    Dementia Brother     Stroke Brother     Hypertension Brother     Heart disease Brother     Diabetes Brother     Hypertension Brother     Allergies Brother     No Known Problems Son     No Known Problems Son     Brain cancer Paternal Aunt         unknown age    Breast cancer Neg Hx      Social History     Tobacco Use    Smoking status: Former     Current packs/day: 0.00     Average packs/day: 2.0 packs/day for 10.0 years (20.0 ttl pk-yrs)     Types: Cigarettes     Start date: 1966     Quit date: 1976     Years since quittin.2     Passive exposure: Past    Smokeless tobacco: Never    Tobacco comments:     Smoked 2 pack a day   Vaping Use    Vaping status: Never Used   Substance and Sexual Activity    Alcohol use: Yes     Comment: 1 or 2 a year    Drug use: No    Sexual activity: Not Currently      Partners: Male     Birth control/protection: Abstinence, Post-menopausal, Female Sterilization     Comment: defer     Current Outpatient Medications on File Prior to Visit   Medication Sig    acetaminophen (TYLENOL) 500 mg tablet Take 650 mg by mouth as needed    albuterol (Ventolin HFA) 90 mcg/act inhaler Inhale 2 puffs every 6 (six) hours as needed for wheezing    amLODIPine (NORVASC) 5 mg tablet TAKE 2 TABLETS BY MOUTH EVERY DAY    amoxicillin-clavulanate (AUGMENTIN) 500-125 mg per tablet Take 1 tablet by mouth every 24 hours for 7 days    aspirin (ECOTRIN LOW STRENGTH) 81 mg EC tablet Take 1 tablet (81 mg total) by mouth daily Do not start before October 31, 2023.    calcium acetate (PHOSLO) capsule Take 1 capsule (667 mg total) by mouth daily after dinner    Calcium Citrate 250 MG TABS Take 2 tablets (500 mg total) by mouth in the morning    Coenzyme Q10 (CoQ10) 100 MG CAPS Take 100 mg by mouth in the morning Bed time    Cyanocobalamin (Vitamin B12) 1000 MCG TBCR Take 1 tablet (1,000 mcg total) by mouth once a week    desloratadine (CLARINEX) 5 MG tablet Take 1 tablet (5 mg total) by mouth daily    escitalopram (LEXAPRO) 10 mg tablet Take 1 tablet (10 mg total) by mouth daily    gabapentin (NEURONTIN) 100 mg capsule Take 1 capsule (100 mg total) by mouth 2 (two) times a day    hydrALAZINE (APRESOLINE) 25 mg tablet Take 1 tablet (25 mg total) by mouth 2 (two) times a day    Icosapent Ethyl (Vascepa) 1 g CAPS Take 2 capsules (2 g total) by mouth 2 (two) times a day    insulin aspart (NovoLOG) 100 units/mL injection Inject under the skin 3 (three) times a day before meals 5units, 5units, 12units.    insulin degludec (Tresiba FlexTouch) 100 units/mL injection pen Inject 20 Units under the skin daily at bedtime    levothyroxine 100 mcg tablet TAKE 1 TABLET BY MOUTH EVERY DAY    methenamine hippurate (HIPREX) 1 g tablet Take 1 tablet (1 g total) by mouth 2 (two) times a day with meals    metoprolol tartrate  (LOPRESSOR) 25 mg tablet Take 1 tablet (25 mg total) by mouth every 12 (twelve) hours    pantoprazole (PROTONIX) 40 mg tablet Take 1 tablet (40 mg total) by mouth 2 (two) times a day    pramipexole (MIRAPEX) 0.25 mg tablet Take 1 tablet (0.25 mg total) by mouth daily at bedtime    Probiotic Product (PROBIOTIC BLEND PO) Take by mouth    rosuvastatin (CRESTOR) 5 mg tablet Take 1 tablet (5 mg total) by mouth daily    sucralfate (CARAFATE) 1 g tablet TAKE 1 TABLET (1 G TOTAL) BY MOUTH 3 (THREE) TIMES A DAY WITH MEALS    torsemide (DEMADEX) 10 mg tablet Take 10 mg. Daily except Monday, Wednesday, Friday 20 mg. (Patient taking differently: daily)    Vitamin D, Cholecalciferol, 25 MCG (1000 UT) TABS Take 2,000 Units by mouth in the morning    B-D ULTRAFINE III SHORT PEN 31G X 8 MM MISC     Icosapent Ethyl (Vascepa) 0.5 g CAPS Take 2 g by mouth 2 (two) times a day    insulin detemir (Levemir FlexTouch) 100 Units/mL injection pen Inject 20 Units under the skin every evening (Patient not taking: Reported on 3/17/2025)    insulin lispro (HumaLOG) 100 units/mL injection Inject under the skin (Patient not taking: Reported on 3/17/2025)    [] predniSONE 20 mg tablet Take 1 tablet (20 mg total) by mouth daily for 3 days (Patient not taking: Reported on 3/24/2025)     Allergies   Allergen Reactions    Ciprofloxacin Hives    Pollen Extract Allergic Rhinitis    Sulfamethoxazole-Trimethoprim Tongue Swelling     Immunization History   Administered Date(s) Administered    COVID-19 MODERNA VACC 0.5 ML IM 2021, 2021, 2022    COVID-19 Pfizer Vac BIVALENT Julio-sucrose 12 Yr+ IM 10/15/2022    COVID-19 Pfizer mRNA vacc PF julio-sucrose 12 yr and older (Comirnaty) 2023, 2024, 2024    INFLUENZA 10/17/2018, 2019, 10/01/2019, 10/15/2022, 2023    Influenza Split High Dose Preservative Free IM 10/17/2018, 10/01/2019, 10/08/2024    Influenza, Seasonal Vaccine, Quadrivalent, Adjuvanted, .5e  08/26/2023    Influenza, high dose seasonal 0.7 mL 09/29/2020, 10/12/2021    Pneumococcal Conjugate Vaccine 20-valent (Pcv20), Polysace 07/12/2022, 07/12/2022, 10/08/2024    Pneumococcal Polysaccharide PPV23 04/01/2021    Tdap 12/26/2018    Zoster Vaccine Recombinant 07/01/2023, 11/30/2023    influenza, trivalent, adjuvanted 09/29/2020, 10/12/2021     Objective   /70 (BP Location: Left arm, Patient Position: Sitting, Cuff Size: Standard)   Pulse 59   Temp (!) 97 °F (36.1 °C)   Resp 20   Wt 68.9 kg (152 lb)   SpO2 99%   BMI 26.93 kg/m²     Physical Exam  Vitals and nursing note reviewed.   Constitutional:       General: She is not in acute distress.     Appearance: She is well-developed.   HENT:      Head: Normocephalic and atraumatic.   Eyes:      Conjunctiva/sclera: Conjunctivae normal.   Cardiovascular:      Rate and Rhythm: Normal rate and regular rhythm.      Heart sounds: No murmur heard.  Pulmonary:      Effort: Pulmonary effort is normal. No respiratory distress.      Breath sounds: Normal breath sounds.   Abdominal:      Palpations: Abdomen is soft.      Tenderness: There is no abdominal tenderness.   Musculoskeletal:         General: No swelling.      Cervical back: Neck supple.      Left foot: Swelling present.      Comments:  swelling involving the left metatarsal with mild erythema and tenderness.   Skin:     General: Skin is warm and dry.      Capillary Refill: Capillary refill takes less than 2 seconds.   Neurological:      Mental Status: She is alert.   Psychiatric:         Mood and Affect: Mood normal.

## 2025-03-24 NOTE — ASSESSMENT & PLAN NOTE
Patient does not feel that her Sx are well controlled enough to decrease PPI to once daily dosing.  Continue PPI BID  Continue famotidine qHS  Discussed adverse kidney and GI health with long-term PPI use   [Not Examined] : not examined [Normal] : The patient is moving all extremities spontaneously without any gross neurologic deficits. They walk with a fluid nonantalgic gait. There are equal and symmetric deep tendon reflexes in the upper and lower extremities bilaterally. There is gross intact sensation to soft and light touch in the bilateral upper and lower extremities

## 2025-03-24 NOTE — TELEPHONE ENCOUNTER
Spoke with patient about the following, she expressed understanding and thanked us for the call:    Other options besides steroids is colchicine typically prescribed by family doctor not by us   Steroids will definitely work as well I would recommend she contact her family doc regarding other treatment such as colchicine thank so much

## 2025-03-24 NOTE — TELEPHONE ENCOUNTER
Patient called stating she went to a walk in clinic who prescribed her prednisone for gout. She states she started with gout on her left great toe last week. She has not started the steroids as she reports they do not make her feel good. The doctor at the clinic told her to call her kidney doctor. Patient reports her left great toe is still swollen and very painful. She is asking if there is something else she can take?  Please advise

## 2025-03-25 DIAGNOSIS — J30.9 ALLERGIC SINUSITIS: ICD-10-CM

## 2025-03-26 RX ORDER — DESLORATADINE 5 MG/1
5 TABLET ORAL DAILY
Qty: 90 TABLET | Refills: 1 | Status: SHIPPED | OUTPATIENT
Start: 2025-03-26

## 2025-03-27 ENCOUNTER — TELEPHONE (OUTPATIENT)
Age: 75
End: 2025-03-27

## 2025-03-27 DIAGNOSIS — M10.172 ACUTE LEAD-INDUCED GOUT INVOLVING TOE OF LEFT FOOT, INITIAL ENCOUNTER: Primary | ICD-10-CM

## 2025-03-27 DIAGNOSIS — T56.0X1A ACUTE LEAD-INDUCED GOUT INVOLVING TOE OF LEFT FOOT, INITIAL ENCOUNTER: Primary | ICD-10-CM

## 2025-03-27 RX ORDER — PREDNISONE 10 MG/1
20 TABLET ORAL DAILY
Qty: 6 TABLET | Refills: 0 | Status: SHIPPED | OUTPATIENT
Start: 2025-03-27 | End: 2025-03-30

## 2025-03-27 NOTE — TELEPHONE ENCOUNTER
Spoke to patient on the phone. Her pain has improved from 10/10 to 5/10. Discussed that we will do a short course of prednisone 20 mg daily for 3 days. She will continue with gabapentin, lidocaine cream PRN and cold compresses.

## 2025-03-27 NOTE — TELEPHONE ENCOUNTER
Pt called. Was seen and treated for gout on 3/24 in the office. Pt states that her symptoms have improved some, but she is still not able to bear weight on the affected foot (left). Pt would like to know if she needs additional medication to treat the gout? Has taken the colchicine as prescribed. Pt is also asking for pain medication. Pt currently takes gabapentin in the AM and PM. Would like to know if there is something better for the pain.

## 2025-04-08 ENCOUNTER — OFFICE VISIT (OUTPATIENT)
Dept: INTERNAL MEDICINE CLINIC | Facility: CLINIC | Age: 75
End: 2025-04-08
Payer: MEDICARE

## 2025-04-08 VITALS
WEIGHT: 157.4 LBS | DIASTOLIC BLOOD PRESSURE: 71 MMHG | HEIGHT: 62 IN | OXYGEN SATURATION: 94 % | BODY MASS INDEX: 28.97 KG/M2 | HEART RATE: 57 BPM | TEMPERATURE: 97 F | SYSTOLIC BLOOD PRESSURE: 171 MMHG

## 2025-04-08 DIAGNOSIS — N18.5 CKD (CHRONIC KIDNEY DISEASE) STAGE 5, GFR LESS THAN 15 ML/MIN (HCC): ICD-10-CM

## 2025-04-08 DIAGNOSIS — M10.172 ACUTE LEAD-INDUCED GOUT INVOLVING TOE OF LEFT FOOT, INITIAL ENCOUNTER: ICD-10-CM

## 2025-04-08 DIAGNOSIS — I12.0 PARENCHYMAL RENAL HYPERTENSION, STAGE 5 CHRONIC KIDNEY DISEASE OR END STAGE RENAL DISEASE (HCC): ICD-10-CM

## 2025-04-08 DIAGNOSIS — E78.2 MIXED HYPERLIPIDEMIA: ICD-10-CM

## 2025-04-08 DIAGNOSIS — M10.372 ACUTE GOUT DUE TO RENAL IMPAIRMENT INVOLVING LEFT FOOT: Primary | ICD-10-CM

## 2025-04-08 DIAGNOSIS — D63.1 ANEMIA IN STAGE 5 CHRONIC KIDNEY DISEASE, NOT ON CHRONIC DIALYSIS  (HCC): ICD-10-CM

## 2025-04-08 DIAGNOSIS — T56.0X1A ACUTE LEAD-INDUCED GOUT INVOLVING TOE OF LEFT FOOT, INITIAL ENCOUNTER: ICD-10-CM

## 2025-04-08 DIAGNOSIS — N25.81 SECONDARY HYPERPARATHYROIDISM OF RENAL ORIGIN (HCC): Chronic | ICD-10-CM

## 2025-04-08 DIAGNOSIS — M10.9 ACUTE GOUT INVOLVING TOE OF LEFT FOOT, UNSPECIFIED CAUSE: ICD-10-CM

## 2025-04-08 DIAGNOSIS — I10 PRIMARY HYPERTENSION: ICD-10-CM

## 2025-04-08 DIAGNOSIS — E55.9 VITAMIN D DEFICIENCY: Chronic | ICD-10-CM

## 2025-04-08 DIAGNOSIS — N18.5 ANEMIA IN STAGE 5 CHRONIC KIDNEY DISEASE, NOT ON CHRONIC DIALYSIS  (HCC): ICD-10-CM

## 2025-04-08 DIAGNOSIS — E03.9 ACQUIRED HYPOTHYROIDISM: Chronic | ICD-10-CM

## 2025-04-08 DIAGNOSIS — E11.21 DIABETIC NEPHROPATHY ASSOCIATED WITH TYPE 2 DIABETES MELLITUS (HCC): ICD-10-CM

## 2025-04-08 PROCEDURE — 99214 OFFICE O/P EST MOD 30 MIN: CPT | Performed by: INTERNAL MEDICINE

## 2025-04-08 PROCEDURE — G2211 COMPLEX E/M VISIT ADD ON: HCPCS | Performed by: INTERNAL MEDICINE

## 2025-04-08 RX ORDER — ALLOPURINOL 100 MG/1
50 TABLET ORAL 2 TIMES WEEKLY
Qty: 12 TABLET | Refills: 0 | Status: SHIPPED | OUTPATIENT
Start: 2025-04-10 | End: 2025-07-09

## 2025-04-08 RX ORDER — TORSEMIDE 10 MG/1
10 TABLET ORAL DAILY PRN
Qty: 150 TABLET | Refills: 1 | Status: SHIPPED | OUTPATIENT
Start: 2025-04-08

## 2025-04-08 RX ORDER — COLCHICINE 0.6 MG/1
0.6 TABLET ORAL DAILY
Qty: 5 TABLET | Refills: 0 | Status: SHIPPED | OUTPATIENT
Start: 2025-04-08 | End: 2025-04-13

## 2025-04-08 RX ORDER — LEVOTHYROXINE SODIUM 125 UG/1
125 TABLET ORAL DAILY
Qty: 90 TABLET | Refills: 0 | Status: SHIPPED | OUTPATIENT
Start: 2025-04-08

## 2025-04-08 RX ORDER — AMLODIPINE BESYLATE 10 MG/1
10 TABLET ORAL DAILY
Qty: 90 TABLET | Refills: 0 | Status: SHIPPED | OUTPATIENT
Start: 2025-04-08

## 2025-04-08 NOTE — PROGRESS NOTES
Name: Trina Davis      : 1950      MRN: [id number]  Encounter Provider: Tree Aguilar MD  Encounter Date: 2025   Encounter department: [facility] INTERNAL MEDICINE [place]  :  Assessment & Plan  Acute gout due to renal impairment involving left foot  Suspect that her advanced CKD stage 5 with declining renal function playing a role +/- chronic loop diuretic use with torsemide. S/p 5 day course of colchicine (completed 3/28) and 3 days course of prednisone 40 mg daily (completed 3/31).  Has noted improvement in swelling and erythema, mild residual swelling left hallux and pain now 3/10   Uric acid level elevated 9.1 25  Goal uric acid level <6 to hopefully prevent further flares.  Recommend start renally dosed allopurinol 50 mg twice weekly once complete resolution of flare.  I would not recommend further steroids at this time.  There is some mild residual swelling and tenderness associated with ambulation, otherwise much improved.  Given that it has been >2 weeks since last colchicine dosing, would be reasonable to re-dose with caution, at 0.6 mg daily for additional 5 days for any residual pain and swelling , no loading dose.  We will check renal function in 1 week  Change torsemide to prn dosing  Orders:  •  colchicine (COLCRYS) 0.6 mg tablet; Take 1 tablet (0.6 mg total) by mouth daily for 5 days  •  allopurinol (ZYLOPRIM) 100 mg tablet; Take 0.5 tablets (50 mg total) by mouth 2 (two) times a week DO NOT START UNTIL GOUT FLARE IS RESOLVED  •  Basic metabolic panel; Future    Acquired hypothyroidism  TSH elevated.   Follows with endocrinology who appropriately adjusted patient's levothyroxine from 100 mcg to  125 mcg.  Repeat TFTs in 6-8 weeks  Orders:  •  levothyroxine 125 mcg tablet; Take 1 tablet (125 mcg total) by mouth daily    CKD (chronic kidney disease) stage 5, GFR less than 15 ml/min (HCC)  Lab Results   Component Value Date    EGFR 9 2025    EGFR 11 (L) 2025    EGFR 10  "03/05/2025    CREATININE 4.26 (H) 03/21/2025    CREATININE 3.97 (H) 03/20/2025    CREATININE 4.00 (H) 03/05/2025       Orders:  •  torsemide (DEMADEX) 10 mg tablet; Take 1 tablet (10 mg total) by mouth daily as needed (lower extremity swelling) Take 10 mg. Daily except Monday, Wednesday, Friday 20 mg.    Primary hypertension  BP not at goal, suspect renovascular HTN in advanced CKD stage 5.  It is unclear if patient is taking 10 mg daily of amlodipine or 5 mg daily, recommend 10 mg daily.  They will check dosage at home and will let me know.  BB was decreased due to complaints of fatigue - has not noticed difference.  We do have room to up-titrate hydralazine if needed.  They are going to keep BP log over the next week and message me via [employer] with readings.  We will decide if hydralazine 25 mg BID should be increased at that time.    Orders:  •  amLODIPine (NORVASC) 10 mg tablet; Take 1 tablet (10 mg total) by mouth daily           History of Present Illness   Patient here for follow-up of recently diagnosed gout.  See above problem focused assessment for HPI details.        Review of Systems   Constitutional:  Positive for fatigue.   Musculoskeletal:  Positive for joint swelling.       Objective   BP (!) 171/71 (BP Location: Left arm, Patient Position: Sitting, Cuff Size: Adult)   Pulse 57   Temp (!) 97 °F (36.1 °C) (Tympanic)   Ht 5' 2\" (1.575 m)   Wt 71.4 kg (157 lb 6.4 oz)   SpO2 94%   BMI 28.79 kg/m²      Physical Exam  Musculoskeletal:         General: Swelling (Mild swelling L hallux metatarsophalangeal joint) and tenderness present.   Skin:     Findings: No erythema.         "

## 2025-04-08 NOTE — PATIENT INSTRUCTIONS
Start taking colchicine 0.6 mg once daily for 5 days    Once your acute gout flare has resolved, meaning you have no further redness of the foot or pain, you can start taking allopurinol 50 mg twice a week going forward    Go for blood work in approximately 1 week to check on your kidney function    Check to see if you are taking amlodipine 10 mg already, you may be taking 2 tabs of 5 mg amlodipine tablets.  If you are already taking 10 mg, continue with this dose.  If you are taking 1 tab of 5 mg, increase to 2 tabs of 5 mg until you run out.  I have sent a new prescription for amlodipine 10 mg tabs that you can  once you run out of your 5 mg tablets.  Keep track of your blood pressure over the next few days, no need to take blood pressure more than once or twice a day.  Please send me those blood pressure readings over the next few days [facility]    Stop taking your torsemide 10 mg daily, only take as needed for any lower extremity swelling    Your endocrinologist increased your levothyroxine to 125 mcg daily from 100 mcg daily.  Please  new medication once your pharmacy has the dose in stock.  Go for blood work in about 6 to 8 weeks.

## 2025-04-08 NOTE — ASSESSMENT & PLAN NOTE
TSH elevated.   Follows with endocrinology who appropriately adjusted patient's levothyroxine from 100 mcg to  125 mcg.  Repeat TFTs in 6-8 weeks  Orders:  •  levothyroxine 125 mcg tablet; Take 1 tablet (125 mcg total) by mouth daily

## 2025-04-08 NOTE — ASSESSMENT & PLAN NOTE
BP not at goal, suspect renovascular HTN in advanced CKD stage 5.  It is unclear if patient is taking 10 mg daily of amlodipine or 5 mg daily, recommend 10 mg daily.  They will check dosage at home and will let me know.  BB was decreased due to complaints of fatigue - has not noticed difference.  We do have room to up-titrate hydralazine if needed.  They are going to keep BP log over the next week and message me via [employer] with readings.  We will decide if hydralazine 25 mg BID should be increased at that time.    Orders:  •  amLODIPine (NORVASC) 10 mg tablet; Take 1 tablet (10 mg total) by mouth daily

## 2025-04-08 NOTE — ASSESSMENT & PLAN NOTE
Suspect that her advanced CKD stage 5 with declining renal function playing a role +/- chronic loop diuretic use with torsemide. S/p 5 day course of colchicine (completed 3/28) and 3 days course of prednisone 40 mg daily (completed 3/31).  Has noted improvement in swelling and erythema, mild residual swelling left hallux and pain now 3/10   Uric acid level elevated 9.1 4/7/25  Goal uric acid level <6 to hopefully prevent further flares.  Recommend start renally dosed allopurinol 50 mg twice weekly once complete resolution of flare.  I would not recommend further steroids at this time.  There is some mild residual swelling and tenderness associated with ambulation, otherwise much improved.  Given that it has been >2 weeks since last colchicine dosing, would be reasonable to re-dose with caution, at 0.6 mg daily for additional 5 days for any residual pain and swelling , no loading dose.  We will check renal function in 1 week  Change torsemide to prn dosing  Orders:  •  colchicine (COLCRYS) 0.6 mg tablet; Take 1 tablet (0.6 mg total) by mouth daily for 5 days  •  allopurinol (ZYLOPRIM) 100 mg tablet; Take 0.5 tablets (50 mg total) by mouth 2 (two) times a week DO NOT START UNTIL GOUT FLARE IS RESOLVED  •  Basic metabolic panel; Future   1

## 2025-04-09 DIAGNOSIS — I10 ESSENTIAL HYPERTENSION: ICD-10-CM

## 2025-04-10 RX ORDER — METOPROLOL TARTRATE 25 MG/1
25 TABLET, FILM COATED ORAL EVERY 12 HOURS
Qty: 180 TABLET | Refills: 1 | Status: SHIPPED | OUTPATIENT
Start: 2025-04-10

## 2025-04-15 ENCOUNTER — RESULTS FOLLOW-UP (OUTPATIENT)
Dept: INTERNAL MEDICINE CLINIC | Facility: CLINIC | Age: 75
End: 2025-04-15

## 2025-04-15 ENCOUNTER — APPOINTMENT (OUTPATIENT)
Dept: LAB | Facility: AMBULARY SURGERY CENTER | Age: 75
End: 2025-04-15
Attending: INTERNAL MEDICINE
Payer: MEDICARE

## 2025-04-15 DIAGNOSIS — M10.372 ACUTE GOUT DUE TO RENAL IMPAIRMENT INVOLVING LEFT FOOT: ICD-10-CM

## 2025-04-15 LAB
ANION GAP SERPL CALCULATED.3IONS-SCNC: 11 MMOL/L (ref 4–13)
BUN SERPL-MCNC: 64 MG/DL (ref 5–25)
CALCIUM SERPL-MCNC: 8.9 MG/DL (ref 8.4–10.2)
CHLORIDE SERPL-SCNC: 102 MMOL/L (ref 96–108)
CO2 SERPL-SCNC: 27 MMOL/L (ref 21–32)
CREAT SERPL-MCNC: 4 MG/DL (ref 0.6–1.3)
GFR SERPL CREATININE-BSD FRML MDRD: 10 ML/MIN/1.73SQ M
GLUCOSE P FAST SERPL-MCNC: 118 MG/DL (ref 65–99)
POTASSIUM SERPL-SCNC: 4 MMOL/L (ref 3.5–5.3)
SODIUM SERPL-SCNC: 140 MMOL/L (ref 135–147)

## 2025-04-15 PROCEDURE — 80048 BASIC METABOLIC PNL TOTAL CA: CPT

## 2025-04-15 PROCEDURE — 36415 COLL VENOUS BLD VENIPUNCTURE: CPT

## 2025-04-22 DIAGNOSIS — M10.39 ACUTE GOUT DUE TO RENAL IMPAIRMENT INVOLVING MULTIPLE SITES: Primary | ICD-10-CM

## 2025-04-22 RX ORDER — COLCHICINE 0.6 MG/1
0.6 TABLET ORAL DAILY
Qty: 5 TABLET | Refills: 0 | Status: SHIPPED | OUTPATIENT
Start: 2025-04-22 | End: 2025-04-27

## 2025-04-24 DIAGNOSIS — E78.1 HYPERTRIGLYCERIDEMIA: ICD-10-CM

## 2025-04-24 RX ORDER — ROSUVASTATIN CALCIUM 5 MG/1
5 TABLET, COATED ORAL DAILY
Qty: 90 TABLET | Refills: 0 | Status: SHIPPED | OUTPATIENT
Start: 2025-04-24

## 2025-05-01 DIAGNOSIS — E11.43 GASTROPARESIS DIABETICORUM  (HCC): Primary | ICD-10-CM

## 2025-05-01 DIAGNOSIS — K31.84 GASTROPARESIS DIABETICORUM  (HCC): Primary | ICD-10-CM

## 2025-05-01 DIAGNOSIS — E53.8 B12 DEFICIENCY: Chronic | ICD-10-CM

## 2025-05-01 DIAGNOSIS — K21.9 GASTROESOPHAGEAL REFLUX DISEASE WITHOUT ESOPHAGITIS: ICD-10-CM

## 2025-05-01 RX ORDER — SUCRALFATE 1 G/1
1 TABLET ORAL
Qty: 270 TABLET | Refills: 2 | Status: SHIPPED | OUTPATIENT
Start: 2025-05-01

## 2025-05-01 NOTE — TELEPHONE ENCOUNTER
Medication: Cyanocobalamin (Vitamin B12) 1000 MCG TBCR     Dose/Frequency: Take 1 tablet (1,000 mcg total) by mouth once a week     Quantity: 12    Pharmacy: [facility] [address]    Office:   [x] PCP/Provider -   [] Speciality/Provider -     Does the patient have enough for 3 days?   [] Yes   [x] No - Send as HP to POD

## 2025-05-05 ENCOUNTER — OFFICE VISIT (OUTPATIENT)
Dept: DERMATOLOGY | Facility: CLINIC | Age: 75
End: 2025-05-05
Payer: MEDICARE

## 2025-05-05 VITALS — BODY MASS INDEX: 29.47 KG/M2 | TEMPERATURE: 97.1 F | WEIGHT: 161.1 LBS

## 2025-05-05 DIAGNOSIS — L57.0 KERATOSIS, ACTINIC: ICD-10-CM

## 2025-05-05 DIAGNOSIS — K13.0 CHEILITIS: Primary | ICD-10-CM

## 2025-05-05 PROCEDURE — 99213 OFFICE O/P EST LOW 20 MIN: CPT | Performed by: NURSE PRACTITIONER

## 2025-05-05 PROCEDURE — 17000 DESTRUCT PREMALG LESION: CPT | Performed by: NURSE PRACTITIONER

## 2025-05-05 NOTE — PROGRESS NOTES
"[facility] Note     Patient Name: Trina Davis  Encounter Date: 5/5/2025       Have you been cared for by a [facility] Dermatologist in the last 3 years and, if so, which description applies to you? Yes. I have been here within the last 3 years, and my medical history has NOT changed since that time. I am not of child-bearing potential.     REVIEW OF SYSTEMS:  Have you recently had or currently have any of the following? No changes in my recent health.   PAST MEDICAL HISTORY:  Have you personally ever had or currently have any of the following?  If \"YES,\" then please provide more detail. No changes in my medical history.   HISTORY OF IMMUNOSUPPRESSION: Do you have a history of any of the following:  Systemic Immunosuppression such as Diabetes, Biologic or Immunotherapy, Chemotherapy, Organ Transplantation, Bone Marrow Transplantation or Prednisone?  No     Answering \"YES\" requires the addition of the dotphrase \"IMMUNOSUPPRESSED\" as the first diagnosis of the patient's visit.   FAMILY HISTORY:  Any \"first degree relatives\" (parent, brother, sister, or child) with the following?    No changes in my family's known health.   PATIENT EXPERIENCE:    Do you want the Dermatologist to perform a COMPLETE skin exam today including a clinical examination under the \"bra and underwear\" areas?  NO  If necessary, do we have your permission to call and leave a detailed message on your Preferred Phone number that includes your specific medical information?  Yes      Allergies   Allergen Reactions    Ciprofloxacin Hives    Pollen Extract Allergic Rhinitis    Sulfamethoxazole-Trimethoprim Tongue Swelling      Current Outpatient Medications:     acetaminophen (TYLENOL) 500 mg tablet, Take 650 mg by mouth as needed, Disp: , Rfl:     albuterol (Ventolin HFA) 90 mcg/act inhaler, Inhale 2 puffs every 6 (six) hours as needed for wheezing, Disp: 54 g, Rfl: 0    allopurinol (ZYLOPRIM) 100 mg tablet, Take 0.5 tablets (50 mg " total) by mouth 2 (two) times a week DO NOT START UNTIL GOUT FLARE IS RESOLVED, Disp: 12 tablet, Rfl: 0    amLODIPine (NORVASC) 10 mg tablet, Take 1 tablet (10 mg total) by mouth daily, Disp: 90 tablet, Rfl: 0    aspirin (ECOTRIN LOW STRENGTH) 81 mg EC tablet, Take 1 tablet (81 mg total) by mouth daily Do not start before October 31, 2023., Disp: , Rfl: 0    B-D ULTRAFINE III SHORT PEN 31G X 8 MM MISC, , Disp: , Rfl: 3    calcium acetate (PHOSLO) capsule, Take 1 capsule (667 mg total) by mouth daily after dinner, Disp: 90 capsule, Rfl: 3    Calcium Citrate 250 MG TABS, Take 2 tablets (500 mg total) by mouth in the morning, Disp: , Rfl:     Coenzyme Q10 (CoQ10) 100 MG CAPS, Take 100 mg by mouth in the morning Bed time, Disp: , Rfl:     colchicine (COLCRYS) 0.6 mg tablet, Take 1 tablet (0.6 mg total) by mouth daily for 5 days, Disp: 5 tablet, Rfl: 0    colchicine (COLCRYS) 0.6 mg tablet, Take 1 tablet (0.6 mg total) by mouth daily for 5 days, Disp: 5 tablet, Rfl: 0    Cyanocobalamin (Vitamin B12) 1000 MCG TBCR, Take 1 tablet (1,000 mcg total) by mouth once a week, Disp: 12 tablet, Rfl: 0    desloratadine (CLARINEX) 5 MG tablet, TAKE 1 TABLET (5 MG TOTAL) BY MOUTH DAILY., Disp: 90 tablet, Rfl: 1    escitalopram (LEXAPRO) 10 mg tablet, Take 1 tablet (10 mg total) by mouth daily, Disp: 90 tablet, Rfl: 3    gabapentin (NEURONTIN) 100 mg capsule, Take 1 capsule (100 mg total) by mouth 2 (two) times a day, Disp: 60 capsule, Rfl: 0    hydrALAZINE (APRESOLINE) 25 mg tablet, Take 1 tablet (25 mg total) by mouth 2 (two) times a day, Disp: 180 tablet, Rfl: 3    Icosapent Ethyl (Vascepa) 1 g CAPS, Take 2 capsules (2 g total) by mouth 2 (two) times a day, Disp: 360 capsule, Rfl: 3    insulin aspart (NovoLOG) 100 units/mL injection, Inject under the skin 3 (three) times a day before meals 5units, 5units, 12units., Disp: , Rfl:     insulin degludec (Tresiba FlexTouch) 100 units/mL injection pen, Inject 20 Units under the skin daily  at bedtime, Disp: , Rfl:     levothyroxine 125 mcg tablet, Take 1 tablet (125 mcg total) by mouth daily, Disp: 90 tablet, Rfl: 0    methenamine hippurate (HIPREX) 1 g tablet, Take 1 tablet (1 g total) by mouth 2 (two) times a day with meals, Disp: 180 tablet, Rfl: 1    metoprolol tartrate (LOPRESSOR) 25 mg tablet, TAKE 1 TABLET (25 MG TOTAL) BY MOUTH EVERY 12 (TWELVE) HOURS, Disp: 180 tablet, Rfl: 1    pantoprazole (PROTONIX) 40 mg tablet, Take 1 tablet (40 mg total) by mouth 2 (two) times a day, Disp: 60 tablet, Rfl: 5    pramipexole (MIRAPEX) 0.25 mg tablet, Take 1 tablet (0.25 mg total) by mouth daily at bedtime, Disp: 90 tablet, Rfl: 1    Probiotic Product (PROBIOTIC BLEND PO), Take by mouth, Disp: , Rfl:     rosuvastatin (CRESTOR) 5 mg tablet, Take 1 tablet (5 mg total) by mouth daily, Disp: 90 tablet, Rfl: 0    sucralfate (CARAFATE) 1 g tablet, Take 1 tablet (1 g total) by mouth 3 (three) times a day before meals, Disp: 270 tablet, Rfl: 2    torsemide (DEMADEX) 10 mg tablet, Take 1 tablet (10 mg total) by mouth daily as needed (lower extremity swelling) Take 10 mg. Daily except Monday, Wednesday, Friday 20 mg., Disp: 150 tablet, Rfl: 1    Vitamin D, Cholecalciferol, 25 MCG (1000 UT) TABS, Take 2,000 Units by mouth in the morning, Disp: , Rfl:           Whom besides the patient is providing clinical information about today's encounter?   NO ADDITIONAL HISTORIAN (patient alone provided history)    Physical Exam and Assessment/Plan by Diagnosis: Patient last seen by Dr. Lindsay for excision on 12/24/24.    CHEILITIS    Physical Exam:  Anatomic Location Affected:  Left Lower Lip   Morphological Description:  clear on exam today  Pertinent Positives:  Pertinent Negatives:    Additional History of Present Condition:  Patient reports recurrent dry, peeling lower lip for the last few months.  States it occurs every few weeks.  Asymptomatic, no pain, tenderness, bleeding, or itch.  She has been trying several  different chap sticks and currently using Kirk's bees.       Assessment and Plan:  Based on a thorough discussion of this condition and the management approach to it (including a comprehensive discussion of the known risks, side effects and potential benefits of treatment), the patient (family) agrees to implement the following specific plan:  AVOID CHAPSTICK / MERON BEES   Apply Vaseline to the lips.   Findings clear on exam today  Advised to contact office or send photo with any recurrence    ACTINIC KERATOSES    Physical Exam:  Anatomic Location Affected:  Left cheek   Morphological Description:  Thin pink papule(s) with gritty scale       Assessment and Plan:  Based on a thorough discussion of this condition and the management approach to it (including a comprehensive discussion of the known risks, side effects and potential benefits of treatment), the patient (family) agrees to implement the following specific plan:  Treated with cryotherapy today; written and verbal consent obtained   Next skin check due in June/July 2025 for history of SCCIS    PROCEDURE:  DESTRUCTION OF PRE-MALIGNANT LESIONS  After a thorough discussion of treatment options and risk/benefits/alternatives (including but not limited to local pain, scarring, dyspigmentation, blistering, and possible superinfection), verbal and written consent were obtained and the aforementioned lesions were treated on with cryotherapy using liquid nitrogen x 2 cycles for 5-10 seconds. The patient tolerated the procedure well, and after-care instructions were provided.     TOTAL NUMBER of 1 pre-malignant lesions were treated today on the ANATOMIC LOCATION: left cheek.       Patient instructions:  Your pre-cancerous lesions (called actinic keratosis) were treated with liquid nitrogen today. The treated areas will get more red, crusted over the next few days. There might be some blistering. Apply vaseline to the treated area for the next week to help it heal  fully. Do not pick at the area. Return in 3-4 weeks for another round of liquid nitrogen treatment if lesion(s)  fails to fully resolve.       Scribe Attestation      I,:  Gilda Carrasco am acting as a scribe while in the presence of the attending physician.:       I,:  RAMIRO Carroll personally performed the services described in this documentation    as scribed in my presence.:

## 2025-05-05 NOTE — PATIENT INSTRUCTIONS
CHEILITIS    Assessment and Plan:  Based on a thorough discussion of this condition and the management approach to it (including a comprehensive discussion of the known risks, side effects and potential benefits of treatment), the patient (family) agrees to implement the following specific plan:  AVOID CHAPSTICK / MERON BEES   Apply Vaseline to the lips.   If you develop a raised bump that bleeds, reach out and we will schedule for Cryotherapy    ACTINIC KERATOSES        Assessment and Plan:  Based on a thorough discussion of this condition and the management approach to it (including a comprehensive discussion of the known risks, side effects and potential benefits of treatment), the patient (family) agrees to implement the following specific plan:  Treated with cryotherapy today; written and verbal consent obtained            Patient instructions:  Your pre-cancerous lesions (called actinic keratosis) were treated with liquid nitrogen today. The treated areas will get more red, crusted over the next few days. There might be some blistering. Apply vaseline to the treated area for the next week to help it heal fully. Do not pick at the area. Return in 3-4 weeks for another round of liquid nitrogen treatment if lesion(s)  fails to fully resolve.

## 2025-05-05 NOTE — Clinical Note
I thought patient had July skin check already scheduled.  She is due for 6 month skin check in June.  Can you please help schedule?

## 2025-05-14 ENCOUNTER — ANESTHESIA (OUTPATIENT)
Dept: GASTROENTEROLOGY | Facility: HOSPITAL | Age: 75
End: 2025-05-14
Payer: MEDICARE

## 2025-05-14 ENCOUNTER — HOSPITAL ENCOUNTER (OUTPATIENT)
Dept: GASTROENTEROLOGY | Facility: HOSPITAL | Age: 75
Setting detail: OUTPATIENT SURGERY
Discharge: HOME | End: 2025-05-14
Attending: INTERNAL MEDICINE
Payer: MEDICARE

## 2025-05-14 ENCOUNTER — TELEPHONE (OUTPATIENT)
Age: 75
End: 2025-05-14

## 2025-05-14 ENCOUNTER — ANESTHESIA EVENT (OUTPATIENT)
Dept: GASTROENTEROLOGY | Facility: HOSPITAL | Age: 75
End: 2025-05-14
Payer: MEDICARE

## 2025-05-14 VITALS
DIASTOLIC BLOOD PRESSURE: 76 MMHG | BODY MASS INDEX: 27.78 KG/M2 | HEART RATE: 51 BPM | RESPIRATION RATE: 16 BRPM | WEIGHT: 156.8 LBS | SYSTOLIC BLOOD PRESSURE: 162 MMHG | HEIGHT: 63 IN | TEMPERATURE: 97 F | OXYGEN SATURATION: 94 %

## 2025-05-14 DIAGNOSIS — K31.7 GASTRIC POLYP: ICD-10-CM

## 2025-05-14 LAB
GLUCOSE SERPL-MCNC: 123 MG/DL (ref 65–140)
GLUCOSE SERPL-MCNC: 125 MG/DL (ref 65–140)

## 2025-05-14 PROCEDURE — 88305 TISSUE EXAM BY PATHOLOGIST: CPT | Performed by: PATHOLOGY

## 2025-05-14 PROCEDURE — 82948 REAGENT STRIP/BLOOD GLUCOSE: CPT

## 2025-05-14 RX ORDER — PROPOFOL 10 MG/ML
INJECTION, EMULSION INTRAVENOUS AS NEEDED
Status: DISCONTINUED | OUTPATIENT
Start: 2025-05-14 | End: 2025-05-14

## 2025-05-14 RX ORDER — SODIUM CHLORIDE, SODIUM LACTATE, POTASSIUM CHLORIDE, CALCIUM CHLORIDE 600; 310; 30; 20 MG/100ML; MG/100ML; MG/100ML; MG/100ML
INJECTION, SOLUTION INTRAVENOUS CONTINUOUS PRN
Status: DISCONTINUED | OUTPATIENT
Start: 2025-05-14 | End: 2025-05-14

## 2025-05-14 RX ORDER — LIDOCAINE HYDROCHLORIDE 10 MG/ML
INJECTION, SOLUTION EPIDURAL; INFILTRATION; INTRACAUDAL; PERINEURAL AS NEEDED
Status: DISCONTINUED | OUTPATIENT
Start: 2025-05-14 | End: 2025-05-14

## 2025-05-14 RX ADMIN — SODIUM CHLORIDE, SODIUM LACTATE, POTASSIUM CHLORIDE, AND CALCIUM CHLORIDE: .6; .31; .03; .02 INJECTION, SOLUTION INTRAVENOUS at 09:58

## 2025-05-14 RX ADMIN — PROPOFOL 20 MG: 10 INJECTION, EMULSION INTRAVENOUS at 10:07

## 2025-05-14 RX ADMIN — Medication 40 MG: at 10:04

## 2025-05-14 RX ADMIN — PROPOFOL 100 MG: 10 INJECTION, EMULSION INTRAVENOUS at 10:02

## 2025-05-14 RX ADMIN — LIDOCAINE HYDROCHLORIDE 50 MG: 10 INJECTION, SOLUTION EPIDURAL; INFILTRATION; INTRACAUDAL at 10:02

## 2025-05-14 RX ADMIN — PROPOFOL 20 MG: 10 INJECTION, EMULSION INTRAVENOUS at 10:09

## 2025-05-14 RX ADMIN — PROPOFOL 50 MG: 10 INJECTION, EMULSION INTRAVENOUS at 10:05

## 2025-05-14 NOTE — ANESTHESIA POSTPROCEDURE EVALUATION
Post-Op Assessment Note    CV Status:  Stable    Pain management: adequate       Mental Status:  Alert and awake   Hydration Status:  Euvolemic   PONV Controlled:  Controlled   Airway Patency:  Patent     Post Op Vitals Reviewed: Yes    No anethesia notable event occurred.    Staff: CRNA         Last Filed PACU Vitals:  Vitals Value Taken Time   Temp 96.9 °F (36.1 °C) 05/14/25 10:17   Pulse 51 05/14/25 10:17   /57 05/14/25 10:17   Resp 15 05/14/25 10:17   SpO2 94 % 05/14/25 10:17       Modified Santos:     Vitals Value Taken Time   Activity 2 05/14/25 10:17   Respiration 2 05/14/25 10:17   Circulation 2 05/14/25 10:17   Consciousness 1 05/14/25 10:17   Oxygen Saturation 2 05/14/25 10:17     Modified Santos Score: 9

## 2025-05-14 NOTE — TELEPHONE ENCOUNTER
Pt transferred to nurse line.  States her CGM read 80, so she had a few oz of apple juice.  Blood sugar still 78, but pt is asymptomatic, but concerned. Asking if she should take anything. Recommend she can take a teaspoon or two of sugar if she is symptomatic or CGM read low glucose.  Pt verbalizes understanding.

## 2025-05-14 NOTE — H&P
"History and Physical - [facility]  Trina Davis 75 y.o. female MRN: [id number]    HPI: Trina Davis is a 75 y.o. year old female who presents for evaluation of gastric polyps and dyspepsia symptoms.      Review of Systems    Historical Information   Past Medical History:   Diagnosis Date    Actinic keratosis     Acute blood loss anemia 09/24/2024    Acute cystitis without hematuria 04/28/2023    Acute cystitis without hematuria 04/28/2023    Acute respiratory failure with hypoxia (HCC) 02/15/2024    Acute-on-chronic kidney injury  (HCC)     NIKOS (acute kidney injury) (HCC) 05/08/2020    Allergic     Allergic rhinitis     Ambulatory dysfunction 10/22/2020    AMS (altered mental status) 07/14/2020    Anesthesia     pt reports \"had to use double lumen endo tube for hiatal hernia repair/so surgeon could get to where he needed to work\"    Arthritis     Aspiration into airway     At high risk for falls 07/17/2024    Michael esophagus 1993    Lot of stress with children    Basal cell carcinoma 2007    left cheek     BCC (basal cell carcinoma) 05/27/2021    Left Nasal tip    Breast discharge 06/19/2023    Breast pain 06/19/2023    Cancer (HCC)     squamous cell cancer on forhead    Cancer (HCC)     basal cell on nose     Cholelithiasis     Chronic kidney disease 2000, 2018    Stones, kidney disease stage 4    Chronic pain disorder     bilat feet and joint pain on occas    Colon polyp     Confusion 10/30/2023    Constipation     COPD (chronic obstructive pulmonary disease) (HCC)     COVID-19 08/2021    recovered at home/did receive monoclonal infusion    COVID-19 virus infection 08/16/2021    Dental bridge present     Depression     Diabetes mellitus (HCC)     Type 2    Diabetic neuropathy (HCC)     Difficulty walking 2017    Disease of thyroid gland     Dyspnea     Elevated liver enzymes 04/04/2023    Epigastric abdominal pain with severe diabetic gastroparesis, status post EGD/Botox injection, 5/14 " 05/17/2021    Facial abrasion, initial encounter 03/22/2023    Fall 03/22/2023    Family history of thyroid problem     Fatty liver     Fibromyalgia, primary     Fracture of orbital floor, blow-out, right, closed, with routine healing, subsequent encounter 03/22/2023    Fracture of orbital floor, right side, initial encounter for closed fracture (HCC) 03/22/2023    GERD (gastroesophageal reflux disease)     Gout     Heart burn     Hiatal hernia     History of cholecystectomy 10/27/2023    History of colon polyps 07/30/2021    History of kidney stones 09/29/2020    Hx of recurrent kidney stones    History of kidney stones 09/29/2020    Hx of recurrent kidney stones  Formatting of this note might be different from the original. Hx of recurrent kidney stones  Last Assessment & Plan:  Formatting of this note might be different from the original. We will set her up for follow-up in 3 months with a KUB prior.  She knows to call if she has any kidney stone type pain in the meantime.    History of Nissen fundoplication 10/27/2023    History of pneumonia     History of repair of hiatal hernia 05/03/2020    Hyperlipidemia     Hyperplasia, parathyroid (HCC)     Hypertension     Hypertensive urgency 10/27/2023    Hyponatremia 04/26/2023    Hypotension 04/13/2022    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 01/06/2023    Kidney problem     Kidney stone     Left hip pain 10/05/2023    Leukocytosis 07/14/2020    Memory loss Julu2 2020    Motion sickness     Motion sickness     Multiple closed fractures of ribs of right side 03/22/2023    Nasal bones, closed fracture 03/22/2023    Neck pain     Obesity 1978    Obesity (BMI 30.0-34.9)     Olecranon bursitis of right elbow 07/29/2024    Other chest pain 09/13/2021    Other fatigue 09/21/2023    Palpitations 07/18/2023    Peripheral neuropathy     Pollen allergies     Preoperative clearance 06/27/2019    Reactive depression 07/17/2024    RLS (restless legs syndrome)     S/P  foot surgery 07/20/2022    SCC (squamous cell carcinoma) 05/04/2021    left mid forehead    SCC (squamous cell carcinoma) 2023    chest    Seasonal allergies     Shingles 01 05 23    Sleep apnea     has inspire    Squamous cell skin cancer 2007    left cheek     Stroke (HCC)     Swollen ankles     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, left     great toe fusion    Transaminitis 06/03/2024    Urinary incontinence     Urinary tract infection 03/28/2022    Wears glasses      Past Surgical History:   Procedure Laterality Date    ABDOMINAL SURGERY  1997,2015,1972    Nissen x2 1972 tubal ligarion    ARTHROSCOPY KNEE Right     BREAST BIOPSY      stereotactic-benign    BREAST BIOPSY      stereo-benign    BREAST CYST EXCISION Bilateral     benign- done in [place]-neg    BREAST EXCISIONAL BIOPSY      unknown date-benign    BREAST EXCISIONAL BIOPSY      unknown date-benign    BREAST EXCISIONAL BIOPSY      unknown date-benign    BREAST EXCISIONAL BIOPSY      unknown age-benign    CARDIAC CATHETERIZATION      CATARACT EXTRACTION Right     CHOLECYSTECTOMY      COLONOSCOPY      EXAMINATION UNDER ANESTHESIA N/A 06/24/2021    Procedure: EXAM UNDER ANESTHESIA (EUA), DISE;  Surgeon: Gregory Maier MD;  [facility];  Service: ENT    HERNIA REPAIR      hiatal    HIATAL HERNIA REPAIR      KNEE SURGERY Right     Torn maniscus lap surg    LIPOSUCTION      LYMPH NODE BIOPSY      MOHS SURGERY  05/20/2021    left mid forehead-Mickey    MOHS SURGERY Left 05/27/2021    Left nasal tip- mickey     NH LIGATION/BIOPSY TEMPORAL ARTERY Left 01/05/2023    Procedure: BIOPSY ARTERY TEMPORAL;  Surgeon: Alec Dennis DO;  [facility];  Service: Vascular    NH OPEN IMPLANTATION CRANIAL NERVE BOOM & PULSE GEN N/A 11/10/2021    Procedure: INSERTION UPPER AIRWAY STIMULATOR, INSPIRE IMPLANT;  Surgeon: Gregory Maier MD;  Location[facility];  Service: ENT    NH UNLISTED PROCEDURE FOOT/TOES Right 07/19/2022    Procedure: 1st metatarsal  phalangeal joint fusion;  Surgeon: Dede Bonner DPM;  [facility];  Service: Podiatry    REDUCTION MAMMAPLASTY Bilateral 2000    REDUCTION MAMMAPLASTY      SKIN BIOPSY      SKIN CANCER EXCISION  2007    squamous cell carcinoma     SKIN CANCER EXCISION  2007    basal cell carcinoma    SKIN CANCER EXCISION  2023    SCCIS chest    SQUAMOUS CELL CARCINOMA EXCISION      TOE SURGERY Right 08/04/2022    Right Great Toe Fusion    TONSILLECTOMY      TUBAL LIGATION      UPPER GASTROINTESTINAL ENDOSCOPY      US GUIDED BREAST BIOPSY RIGHT COMPLETE Right 07/18/2022     Social History   Social History     Substance and Sexual Activity   Alcohol Use Yes    Comment: 1 or 2 a year     Social History     Substance and Sexual Activity   Drug Use No     Tobacco Use History[1]  Family History   Problem Relation Age of Onset    Heart disease Mother     Depression Mother     Hypertension Mother     COPD Mother     Hearing loss Mother     Anxiety disorder Mother     Heart disease Father     Lung cancer Father 70        Smoker     Cancer Father         brain    Alcohol abuse Father     Dementia Father     Hypertension Father     Thyroid disease Father     COPD Father     Arthritis Father     Brain cancer Father 74    Brain cancer Sister     Hypertension Sister     Diabetes Sister     Heart disease Sister     Thyroid disease Sister     Cancer Sister         Lympoma, lung    Lung cancer Sister 79    Anxiety disorder Sister     Migraines Sister     Hypertension Brother     Diabetes Brother     Cancer Brother         Throat    Dementia Brother     Stroke Brother     Hypertension Brother     Heart disease Brother     Diabetes Brother     Hypertension Brother     Allergies Brother     No Known Problems Son     No Known Problems Son     Brain cancer Paternal Aunt         unknown age    Breast cancer Neg Hx        Meds/Allergies     Not in a hospital admission.    Allergies[2]    Objective     /79   Pulse (!) 52   Temp (!)  "96.4 °F (35.8 °C) (Temporal)   Resp 16   Ht 5' 3\" (1.6 m)   Wt 71.1 kg (156 lb 12.8 oz)   SpO2 97%   BMI 27.78 kg/m²       PHYSICAL EXAM    Gen: NAD  CV: RRR  CHEST: Clear  ABD: soft, NT/ND  EXT: no edema  Neuro: AAO      ASSESSMENT/PLAN:  This is a 75 y.o. year old female here for evaluation of gastric polyps and dyspepsia symptoms.    PLAN:   Procedure: EGD.           [1]   Social History  Tobacco Use   Smoking Status Former    Current packs/day: 0.00    Average packs/day: 2.0 packs/day for 10.0 years (20.0 ttl pk-yrs)    Types: Cigarettes    Start date: 1966    Quit date: 1976    Years since quittin.4    Passive exposure: Past   Smokeless Tobacco Never   Tobacco Comments    Smoked 2 pack a day   [2]   Allergies  Allergen Reactions    Ciprofloxacin Hives    Pollen Extract Allergic Rhinitis    Sulfamethoxazole-Trimethoprim Tongue Swelling     "

## 2025-05-14 NOTE — ANESTHESIA PREPROCEDURE EVALUATION
Procedure:  EGD    Recent gout flare treated with clochicine 4/22/25  Stage V CKD on chronic torsemide (GFR 10)    Baseline BP at home 140s/80s  Glucose 125 this AM    Relevant Problems   CARDIO   (+) Dieulafoy lesion of colon   (+) HLD (hyperlipidemia)   (+) Hypertension   (+) Varicose veins of both lower extremities with pain      ENDO   (+) Hypothyroidism   (+) Secondary hyperparathyroidism of renal origin (HCC)   (+) Type 2 diabetes mellitus (HCC)      GI/HEPATIC   (+) Gastroesophageal reflux disease without esophagitis      /RENAL   (+) Benign hypertensive kidney disease with chronic kidney disease   (+) Stage 5 chronic kidney disease not on chronic dialysis (HCC)      HEMATOLOGY   (+) Anemia in stage 5 chronic kidney disease, not on chronic dialysis  (HCC)      MUSCULOSKELETAL   (+) Acute gout due to renal impairment involving left foot   (+) Fibromyalgia      NEURO/PSYCH   (+) Fibromyalgia   (+) Gastroparesis diabeticorum  (HCC)   (+) Mild vascular dementia without behavioral disturbance, psychotic disturbance, mood disturbance, or anxiety (HCC)      PULMONARY   (+) COPD (chronic obstructive pulmonary disease) ([facility])       EKG 3/21/25  Sinus rhythm with 1st degree A-V block  Voltage criteria for left ventricular hypertrophy  Nonspecific T wave abnormality    Physical Exam    Airway     Mallampati score: IV  TM Distance: >3 FB  Neck ROM: full      Cardiovascular      Dental        Pulmonary      Neurological    She appears awake, alert and oriented x3.      Other Findings  post-pubertal.      Anesthesia Plan  ASA Score- 4     Anesthesia Type- MAC with ASA Monitors.         Additional Monitors:     Airway Plan:     Comment: High risk of worsening renal function with blood pressure fluctuations.    Recent labs personally reviewed:  Lab Results       Component                Value               Date                       WBC                      10.65 (H)           03/21/2025                 HGB                       13.0                03/21/2025                 PLT                      238                 03/21/2025            Lab Results       Component                Value               Date                       K                        4.0                 04/15/2025                 BUN                      64 (H)              04/15/2025                 CREATININE               4.00 (H)            04/15/2025                 GLUCOSE                  168 (H)             11/30/2018            Lab Results       Component                Value               Date                       PTT                      32                  09/29/2024             Lab Results       Component                Value               Date                       INR                      0.89                09/29/2024              Blood type A    Patient was consented for sedation with IV anesthetic. Discussed that we will maintain spontaneous respirations and utilize supplemental O2. I discussed the risks of aspiration, hypoxia, laryngospasm and bronchospasm. I discussed the scenarios related to conversion to general anesthetic. All questions answered.     I, Nidhi Juan MD, have personally seen and evaluated the patient prior to anesthetic care.  I have reviewed the pre-anesthetic record, medical history, allergies, medications and any other medical records if appropriate to the anesthetic care.  If a CRNA is involved in the case, I have reviewed the CRNA assessment, if present, and agree. Patient consented for IV Sedation, general anesthesia as back up. Discussed risks of aspiration, IV infiltration, indications for conversion to general anesthesia. All questions and concerns addressed.   .       Plan Factors-Exercise tolerance (METS): >4 METS.    Chart reviewed. EKG reviewed. Imaging results reviewed. Existing labs reviewed. Patient summary reviewed.    Patient is not a current smoker.  Patient did not smoke on day of surgery.    Obstructive  sleep apnea risk education given perioperatively.        Induction-     Postoperative Plan- .   Monitoring Plan - Monitoring plan - standard ASA monitoring          Informed Consent- Anesthetic plan and risks discussed with patient.  I personally reviewed this patient with the CRNA. Discussed and agreed on the Anesthesia Plan with the CRNA..      NPO Status:  No vitals data found for the desired time range.

## 2025-05-16 PROCEDURE — 88305 TISSUE EXAM BY PATHOLOGIST: CPT | Performed by: PATHOLOGY

## 2025-05-20 ENCOUNTER — TELEPHONE (OUTPATIENT)
Age: 75
End: 2025-05-20

## 2025-05-20 NOTE — TELEPHONE ENCOUNTER
Trina called today to inquire if there is any blood work she needs to have done prior to her office visit in June.  She was informed that she currently has no active lab orders and that any future test will be ordered at her appointment.

## 2025-05-22 ENCOUNTER — RESULTS FOLLOW-UP (OUTPATIENT)
Age: 75
End: 2025-05-22

## 2025-06-05 ENCOUNTER — RESULTS FOLLOW-UP (OUTPATIENT)
Dept: NEPHROLOGY | Facility: CLINIC | Age: 75
End: 2025-06-05

## 2025-06-05 ENCOUNTER — APPOINTMENT (OUTPATIENT)
Dept: LAB | Facility: AMBULARY SURGERY CENTER | Age: 75
End: 2025-06-05
Attending: INTERNAL MEDICINE
Payer: MEDICARE

## 2025-06-05 ENCOUNTER — APPOINTMENT (OUTPATIENT)
Dept: LAB | Facility: AMBULARY SURGERY CENTER | Age: 75
End: 2025-06-05
Payer: MEDICARE

## 2025-06-05 DIAGNOSIS — N18.5 ANEMIA IN STAGE 5 CHRONIC KIDNEY DISEASE, NOT ON CHRONIC DIALYSIS  (HCC): Chronic | ICD-10-CM

## 2025-06-05 DIAGNOSIS — E78.2 MIXED HYPERLIPIDEMIA: ICD-10-CM

## 2025-06-05 DIAGNOSIS — D63.1 ANEMIA IN STAGE 5 CHRONIC KIDNEY DISEASE, NOT ON CHRONIC DIALYSIS  (HCC): Chronic | ICD-10-CM

## 2025-06-05 DIAGNOSIS — N25.81 SECONDARY HYPERPARATHYROIDISM OF RENAL ORIGIN (HCC): Chronic | ICD-10-CM

## 2025-06-05 DIAGNOSIS — I12.0 BENIGN HYPERTENSIVE KIDNEY DISEASE WITH CHRONIC KIDNEY DISEASE STAGE V OR END STAGE RENAL DISEASE (HCC): ICD-10-CM

## 2025-06-05 DIAGNOSIS — R60.0 EDEMA OF EXTREMITY: ICD-10-CM

## 2025-06-05 DIAGNOSIS — N18.5 STAGE 5 CHRONIC KIDNEY DISEASE (HCC): Primary | ICD-10-CM

## 2025-06-05 DIAGNOSIS — R80.1 PERSISTENT PROTEINURIA: ICD-10-CM

## 2025-06-05 DIAGNOSIS — N18.5 STAGE 5 CHRONIC KIDNEY DISEASE NOT ON CHRONIC DIALYSIS (HCC): ICD-10-CM

## 2025-06-05 LAB
25(OH)D3 SERPL-MCNC: 25.2 NG/ML (ref 30–100)
ALBUMIN SERPL BCG-MCNC: 3.8 G/DL (ref 3.5–5)
ALP SERPL-CCNC: 71 U/L (ref 34–104)
ALT SERPL W P-5'-P-CCNC: 40 U/L (ref 7–52)
ANION GAP SERPL CALCULATED.3IONS-SCNC: 9 MMOL/L (ref 4–13)
AST SERPL W P-5'-P-CCNC: 22 U/L (ref 13–39)
BILIRUB SERPL-MCNC: 0.25 MG/DL (ref 0.2–1)
BUN SERPL-MCNC: 75 MG/DL (ref 5–25)
CALCIUM SERPL-MCNC: 9 MG/DL (ref 8.4–10.2)
CHLORIDE SERPL-SCNC: 102 MMOL/L (ref 96–108)
CO2 SERPL-SCNC: 27 MMOL/L (ref 21–32)
CREAT SERPL-MCNC: 5.23 MG/DL (ref 0.6–1.3)
CREAT UR-MCNC: 93.3 MG/DL
ERYTHROCYTE [DISTWIDTH] IN BLOOD BY AUTOMATED COUNT: 13.8 % (ref 11.6–15.1)
GFR SERPL CREATININE-BSD FRML MDRD: 7 ML/MIN/1.73SQ M
GLUCOSE P FAST SERPL-MCNC: 127 MG/DL (ref 65–99)
HCT VFR BLD AUTO: 34 % (ref 34.8–46.1)
HGB BLD-MCNC: 11 G/DL (ref 11.5–15.4)
MAGNESIUM SERPL-MCNC: 2.9 MG/DL (ref 1.9–2.7)
MCH RBC QN AUTO: 30.1 PG (ref 26.8–34.3)
MCHC RBC AUTO-ENTMCNC: 32.4 G/DL (ref 31.4–37.4)
MCV RBC AUTO: 93 FL (ref 82–98)
PHOSPHATE SERPL-MCNC: 4.1 MG/DL (ref 2.3–4.1)
PLATELET # BLD AUTO: 249 THOUSANDS/UL (ref 149–390)
PMV BLD AUTO: 11.9 FL (ref 8.9–12.7)
POTASSIUM SERPL-SCNC: 3.8 MMOL/L (ref 3.5–5.3)
PROT SERPL-MCNC: 6.2 G/DL (ref 6.4–8.4)
PROT UR-MCNC: 616.4 MG/DL
PROT/CREAT UR: 6.6 MG/G{CREAT}
PTH-INTACT SERPL-MCNC: 105.7 PG/ML (ref 12–88)
RBC # BLD AUTO: 3.65 MILLION/UL (ref 3.81–5.12)
SODIUM SERPL-SCNC: 138 MMOL/L (ref 135–147)
WBC # BLD AUTO: 8.49 THOUSAND/UL (ref 4.31–10.16)

## 2025-06-05 PROCEDURE — 83735 ASSAY OF MAGNESIUM: CPT

## 2025-06-05 PROCEDURE — 84100 ASSAY OF PHOSPHORUS: CPT

## 2025-06-05 PROCEDURE — 82570 ASSAY OF URINE CREATININE: CPT

## 2025-06-05 PROCEDURE — 82306 VITAMIN D 25 HYDROXY: CPT

## 2025-06-05 PROCEDURE — 83970 ASSAY OF PARATHORMONE: CPT

## 2025-06-05 PROCEDURE — 80053 COMPREHEN METABOLIC PANEL: CPT

## 2025-06-05 PROCEDURE — 84156 ASSAY OF PROTEIN URINE: CPT

## 2025-06-05 PROCEDURE — 36415 COLL VENOUS BLD VENIPUNCTURE: CPT

## 2025-06-05 PROCEDURE — 85027 COMPLETE CBC AUTOMATED: CPT

## 2025-06-05 NOTE — TELEPHONE ENCOUNTER
Pt was also taking Mg 400 mg daily which she will stop.   She has been taking Allopurinol 100 mg twice a week for gout.   Reviewed with Dr. Rausch as well. He advised that if she develops any new symptoms like worsening fatigue, worsening swelling or shortness of breath or confusion to go to ED.  Continue to hold Torsemide, if she gains more than 3 lbs in 1 day to let us know.

## 2025-06-05 NOTE — TELEPHONE ENCOUNTER
Talked with pt.  She has some decreased appetite but nothing real bad.  No other symptoms other than some fatigue.    Only experiences leg swelling if she is up and about for long periods.  Usually reclines with her feet up.  She is only taking Torsemide once a week.  Only takes PRN.  Advised to repeat BMP in 1-2 weeks per  Dr. Baires and send in BP readings in a couple of weeks.    (Pt reinforced that she does NOT want dialysis)    ----- Message from Donaldo Baires MD sent at 6/5/2025 12:09 PM EDT -----  Patient's kidney function is definitely worse  1.  Make sure she feels well no severe nausea vomiting decreased appetite tiredness  2.  If she does not feel well then just repeat a BMP in 1 to 2 weeks with good hydration.  If she has no leg swelling or shortness of breath have her hold her torsemide for couple days then start   taking it every other day  3.  If she does not feel well let me know  4.  In a couple weeks have her send in a week of blood pressure readings morning evening sitting standing before meds thank you    5.  Try to get her in with one of the advanced practitioners in the next few weeks I think she is scheduled in July so that may be adequate!  ----- Message -----  From: Lab, Background User  Sent: 6/5/2025  11:48 AM EDT  To: Donaldo Baires MD

## 2025-06-06 ENCOUNTER — RA CDI HCC (OUTPATIENT)
Dept: OTHER | Facility: HOSPITAL | Age: 75
End: 2025-06-06

## 2025-06-09 ENCOUNTER — OFFICE VISIT (OUTPATIENT)
Dept: INTERNAL MEDICINE CLINIC | Facility: CLINIC | Age: 75
End: 2025-06-09
Payer: MEDICARE

## 2025-06-09 ENCOUNTER — DOCUMENTATION (OUTPATIENT)
Dept: NEPHROLOGY | Facility: CLINIC | Age: 75
End: 2025-06-09

## 2025-06-09 VITALS
HEIGHT: 63 IN | DIASTOLIC BLOOD PRESSURE: 66 MMHG | SYSTOLIC BLOOD PRESSURE: 160 MMHG | HEART RATE: 52 BPM | RESPIRATION RATE: 16 BRPM | WEIGHT: 155 LBS | TEMPERATURE: 97.6 F | OXYGEN SATURATION: 97 % | BODY MASS INDEX: 27.46 KG/M2

## 2025-06-09 DIAGNOSIS — E11.21 DIABETIC NEPHROPATHY ASSOCIATED WITH TYPE 2 DIABETES MELLITUS (HCC): ICD-10-CM

## 2025-06-09 DIAGNOSIS — I10 ESSENTIAL HYPERTENSION: ICD-10-CM

## 2025-06-09 DIAGNOSIS — E83.41 HYPERMAGNESEMIA: ICD-10-CM

## 2025-06-09 DIAGNOSIS — N25.81 SECONDARY HYPERPARATHYROIDISM OF RENAL ORIGIN (HCC): Chronic | ICD-10-CM

## 2025-06-09 DIAGNOSIS — N18.5 CKD (CHRONIC KIDNEY DISEASE) STAGE 5, GFR LESS THAN 15 ML/MIN (HCC): ICD-10-CM

## 2025-06-09 DIAGNOSIS — E78.2 MIXED HYPERLIPIDEMIA: ICD-10-CM

## 2025-06-09 DIAGNOSIS — E55.9 VITAMIN D DEFICIENCY: Chronic | ICD-10-CM

## 2025-06-09 DIAGNOSIS — I12.0 PARENCHYMAL RENAL HYPERTENSION, STAGE 5 CHRONIC KIDNEY DISEASE OR END STAGE RENAL DISEASE (HCC): ICD-10-CM

## 2025-06-09 DIAGNOSIS — D63.1 ANEMIA IN STAGE 5 CHRONIC KIDNEY DISEASE, NOT ON CHRONIC DIALYSIS  (HCC): ICD-10-CM

## 2025-06-09 DIAGNOSIS — Z00.00 MEDICARE ANNUAL WELLNESS VISIT, SUBSEQUENT: Primary | ICD-10-CM

## 2025-06-09 DIAGNOSIS — I12.9 PARENCHYMAL RENAL HYPERTENSION, STAGE 1 THROUGH STAGE 4 OR UNSPECIFIED CHRONIC KIDNEY DISEASE: ICD-10-CM

## 2025-06-09 DIAGNOSIS — N18.5 ANEMIA IN STAGE 5 CHRONIC KIDNEY DISEASE, NOT ON CHRONIC DIALYSIS  (HCC): ICD-10-CM

## 2025-06-09 PROBLEM — Z79.4 DIABETES MELLITUS DUE TO UNDERLYING CONDITION WITH STAGE 5 CHRONIC KIDNEY DISEASE NOT ON CHRONIC DIALYSIS, WITH LONG-TERM CURRENT USE OF INSULIN (HCC): Status: ACTIVE | Noted: 2023-10-27

## 2025-06-09 PROBLEM — R42 POSTURAL DIZZINESS WITH PRESYNCOPE: Status: RESOLVED | Noted: 2023-08-16 | Resolved: 2025-06-09

## 2025-06-09 PROBLEM — Z86.73 HISTORY OF CEREBROVASCULAR ACCIDENT (CVA) DUE TO ISCHEMIA: Chronic | Status: RESOLVED | Noted: 2023-10-31 | Resolved: 2025-06-09

## 2025-06-09 PROBLEM — Z48.02 VISIT FOR SUTURE REMOVAL: Status: RESOLVED | Noted: 2025-01-08 | Resolved: 2025-06-09

## 2025-06-09 PROBLEM — R55 POSTURAL DIZZINESS WITH PRESYNCOPE: Status: RESOLVED | Noted: 2023-08-16 | Resolved: 2025-06-09

## 2025-06-09 PROBLEM — E08.22 DIABETES MELLITUS DUE TO UNDERLYING CONDITION WITH STAGE 5 CHRONIC KIDNEY DISEASE NOT ON CHRONIC DIALYSIS, WITH LONG-TERM CURRENT USE OF INSULIN (HCC): Status: ACTIVE | Noted: 2023-10-27

## 2025-06-09 PROBLEM — Z87.440 HISTORY OF RECURRENT UTIS: Chronic | Status: RESOLVED | Noted: 2023-10-27 | Resolved: 2025-06-09

## 2025-06-09 PROCEDURE — G0439 PPPS, SUBSEQ VISIT: HCPCS | Performed by: INTERNAL MEDICINE

## 2025-06-09 RX ORDER — HYDRALAZINE HYDROCHLORIDE 25 MG/1
50 TABLET, FILM COATED ORAL 2 TIMES DAILY
Qty: 180 TABLET | Refills: 3 | Status: SHIPPED | OUTPATIENT
Start: 2025-06-09 | End: 2025-06-12

## 2025-06-09 RX ORDER — LANOLIN ALCOHOL/MO/W.PET/CERES
400 CREAM (GRAM) TOPICAL DAILY
COMMUNITY
Start: 2025-05-22 | End: 2025-06-09

## 2025-06-09 NOTE — ASSESSMENT & PLAN NOTE
Worsening treat conservatively limited options given advanced kidney disease  Orders:    US kidney and bladder with pvr; Future    Basic metabolic panel; Future    Comprehensive metabolic panel; Future    CBC; Future    Magnesium; Future    Phosphorus; Future    Protein / creatinine ratio, urine; Future    PTH, intact; Future

## 2025-06-09 NOTE — ASSESSMENT & PLAN NOTE
Still elevated increase hydralazine 75 mg twice a day  Orders:    US kidney and bladder with pvr; Future    Basic metabolic panel; Future    Comprehensive metabolic panel; Future    CBC; Future    Magnesium; Future    Phosphorus; Future    Protein / creatinine ratio, urine; Future    PTH, intact; Future

## 2025-06-09 NOTE — PROGRESS NOTES
Name: Trina Davis      : 1950      MRN: [id number]  Encounter Provider: Donaldo Baires MD  Encounter Date: 2025   Encounter department: [facility] [place]  :  Assessment & Plan  Type 2 diabetes mellitus with stage 5 chronic kidney disease not on chronic dialysis, unspecified whether long term insulin use (HCC)  Avoid SGLT2 inhibitors with frequent urinary tract infections    Benign hypertensive kidney disease with chronic kidney disease stage V or end stage renal disease (HCC)  Still elevated increase hydralazine 75 mg twice a day  Orders:    US kidney and bladder with pvr; Future    Basic metabolic panel; Future    Comprehensive metabolic panel; Future    CBC; Future    Magnesium; Future    Phosphorus; Future    Protein / creatinine ratio, urine; Future    PTH, intact; Future    Stage 5 chronic kidney disease not on chronic dialysis (HCC)  Worsening/advancing disease:  Current uremic symptoms mild will monitor for now  Current treatment approach no dialysis   Orders:    US kidney and bladder with pvr; Future    Basic metabolic panel; Future    Comprehensive metabolic panel; Future    CBC; Future    Magnesium; Future    Phosphorus; Future    Protein / creatinine ratio, urine; Future    PTH, intact; Future    Persistent proteinuria  Worsening treat conservatively limited options given advanced kidney disease  Orders:    US kidney and bladder with pvr; Future    Basic metabolic panel; Future    Comprehensive metabolic panel; Future    CBC; Future    Magnesium; Future    Phosphorus; Future    Protein / creatinine ratio, urine; Future    PTH, intact; Future    Anemia in stage 5 chronic kidney disease, not on chronic dialysis  (HCC)  Stable hemoglobin no changes    Orders:    US kidney and bladder with pvr; Future    Basic metabolic panel; Future    Comprehensive metabolic panel; Future    CBC; Future    Magnesium; Future    Phosphorus; Future    Protein / creatinine ratio, urine;  Future    PTH, intact; Future    Secondary hyperparathyroidism of renal origin (HCC)  Acceptable level just treat vitamin D deficiency  Orders:    US kidney and bladder with pvr; Future    hydrALAZINE (APRESOLINE) 25 mg tablet; Take 1 tablet (25 mg total) by mouth in the morning and 1 tablet (25 mg total) before bedtime. Take with 50 mg hydralazine daily.    Basic metabolic panel; Future    Comprehensive metabolic panel; Future    CBC; Future    Magnesium; Future    Phosphorus; Future    Protein / creatinine ratio, urine; Future    PTH, intact; Future    Edema of extremity  Current status doing well just use torsemide as needed  Orders:     kidney and bladder with pvr; Future    Basic metabolic panel; Future    Comprehensive metabolic panel; Future    CBC; Future    Magnesium; Future    Phosphorus; Future    Protein / creatinine ratio, urine; Future    PTH, intact; Future    Vitamin D deficiency  Treat  Orders:    US kidney and bladder with pvr; Future    Basic metabolic panel; Future    Comprehensive metabolic panel; Future    CBC; Future    Magnesium; Future    Phosphorus; Future    Protein / creatinine ratio, urine; Future    PTH, intact; Future    Mixed hyperlipidemia  Has been at goal no changes  Orders:     kidney and bladder with pvr; Future    Basic metabolic panel; Future    Comprehensive metabolic panel; Future    CBC; Future    Magnesium; Future    Phosphorus; Future    Protein / creatinine ratio, urine; Future    PTH, intact; Future    Essential hypertension    Orders:    hydrALAZINE (APRESOLINE) 25 mg tablet; Take 1 tablet (25 mg total) by mouth in the morning and 1 tablet (25 mg total) before bedtime. Take with 50 mg hydralazine daily.    Basic metabolic panel; Future    Comprehensive metabolic panel; Future    CBC; Future    Magnesium; Future    Phosphorus; Future    Protein / creatinine ratio, urine; Future    PTH, intact; Future    Parenchymal renal hypertension, stage 1 through stage 4 or  "unspecified chronic kidney disease  Lab Results   Component Value Date    EGFR 8 06/10/2025    EGFR 7 06/05/2025    EGFR 10 04/15/2025    CREATININE 4.78 (H) 06/10/2025    CREATININE 5.23 (H) 06/05/2025    CREATININE 4.00 (H) 04/15/2025       Orders:    hydrALAZINE (APRESOLINE) 25 mg tablet; Take 1 tablet (25 mg total) by mouth in the morning and 1 tablet (25 mg total) before bedtime. Take with 50 mg hydralazine daily.    Basic metabolic panel; Future    Comprehensive metabolic panel; Future    CBC; Future    Magnesium; Future    Phosphorus; Future    Protein / creatinine ratio, urine; Future    PTH, intact; Future            40' minutes were spent face to face with patient with greater than 50% of the time spent in counseling or coordination of care including discussions of findings and workup and treatment instructions.  All of the patient's questions were answered to their satisfaction during this discussion.  I advised the patient to call if there is any further questions or concerns.  See above regarding options including hospice         History of Present Illness   HPI  Trina Davis is a 75 y.o. female who presents with follow-up regarding CKD 5  There has been no hospitalizations or acute illnesses since last visit.  The patient complaining of being \"off slightly shaky and dull \"  Poor appetite and poor energy definitely not feeling as well as when I last saw her overall  No significant restless leg she does take as needed gabapentin  Occasional sleep during the day but not too bad  No fevers, chills, or cough or colds except for postnasal drip  No hematuria, dysuria, does have difficulty voiding at times this is somewhat chronic, bubbles in the urine unchanged  No nausea or vomiting, some difficulty eating large portions she gets some discomfort in the abdomen, occasional loose stools  No chest pain, but does have dyspnea on exertion somewhat worse, will get some leg edema unless she keeps them elevated then " not an issue  Current headache/pressure, does get dizziness or lightheadedness; also separately some imbalance  Blood pressure medications:  Amlodipine 10 mg at bedtime  Hydralazine 50 mg twice a day  Torsemide 80 mg only as needed she has not taken it for 1 week    Renal pertinent medications:  Allopurinol 100 mg twice a week for gout prevention  Calcium citrate 500 mg at bedtime  PhosLo 667 mg after dinner  Gabapentin only as needed  Pantoprazole 40 mg twice a day  Vitamin D 1200 mg daily  Aspirin 81 mg daily   fate 1 g twice a day for inflammatory polyps in the body of the stomach      Review of Systems  Please see HPI, otherwise the review of systems as completely reviewed with the patient are negative    Pertinent Medical History     1.  CKD stage 5.:  Etiology:  Diabetic nephropathy/hypertensive nephrosclerosis/arteriolar nephrosclerosis/chronic NSAID use.  No evidence of obstructive uropathy, renal artery disease or primary glomerular disease with a bland urinalysis  Of note, CPK was normal at 105 no evidence rhabdomyolysis.  Baseline creatinine: 4 but advancing  Current creatinine 5.23 from 6/5 now 4.78 from 6/10/2025 somewhat improved  Urine protein creatinine ratio: 6.6 g higher once again  UA demonstrated no significant hematuria but 3+ proteinuria from  Serologies:  Normal C3/C4; negative rheumatoid factor; negative SPEP:  Negative UPEP;  Light chain ratio 2.09 essentially normal for chronic kidney disease  negative ELOY; negative hepatitis-C; normal IgA; normal ASO; negative RPR     Renal ultrasound no hydronephrosis small bilateral renal simple cyst left upper pole nonobstructing calculi okay    Will repeat renal ultrasound some difficulty voiding just to make sure this is not an issue as of 6/12/2025        Recommendations:  Treat hypertension-please see below  Treat dyslipidemia-please see below  Maintain proteinuria less than 1 g or as low as possible: Adjust antihypertensive regimen in order  to try to get to proteinuria less than 1 g please see below.  At this time just treat conservatively please see below as mentioned  Avoid nephrotoxic agents such as NSAIDs, patient counseled as such  S/p kidney smart  ACP meeting completed actually no dialysis however.  Reviewed with the patient as of 3/10/2025: She would never want to do in center hemodialysis PD would be her preference but at this juncture she reviewed it again and would not want dialysis would prefer to die peacefully at that time.      Discussed at length with Trina and her  at this juncture she absolutely does not want kidney machine dialysis understands this will mean eventual death.  For the moment she would like to continue with treatment however when she hits a point where she is really not feeling well she may be willing to go onto hospice.  She has a palliative care consultation July 17 to help transition as needed.       2.  Volume:   Current status:  Euvolemic   Torsemide dose: Currently on torsemide 10 mg as needed     3.  Hypertension:  Workup:  saline suppression test for primary aldosterone state was normal  plasma free metanephrines was negative  renal artery duplex was negative 02/2019       Current blood pressure averages:   AM: 174/80, standing 153/75  PM: 173/78, standing 150/67  Heart rate: 50-60 range        Goal blood pressure:  less than 125/75 given less than 1 g of proteinuria at this time  Recommendations:  Push nonmedical regimen including weight loss, isotonic exercise and avoidance of salt; patient counseled as such  Medication changes today:   Increase hydralazine 75 mg twice a day  Recheck blood pressures 2 to 3 weeks     4.  Electrolytes:   All acceptable including potassium 3.9     5.  Mineral bone disorder:  Calcium/magnesium/phosphorus:  All acceptable except for elevated magnesium avoid products (currently off all magnesium products)  PTH intact: 105.7 elevated but avoid overtreatment just replete vitamin  D this is from 6/5/2025   Vitamin-D: 25.2 replete from 6/5/2025      6.  Dyslipidemia:  Goal LDL:  Less than 100  Current lipid profile: LDL 25/HDL 34/triglycerides 331 from 4/8/24  Recommendations:  Per Endocrinology but essentially at goal        7.  Anemia:   Current hemoglobin 11.0 stable from 6/5/2025  Followed closely by GI  Studies from 1/30/2025: Saturation 27% ferritin only 28 recommend oral iron 3 days a week      8.  Other problems:  Depression  Diabetes mellitus per primary medical physician: I would avoid SGLT2 inhibitors at the moment with frequent urinary tract infections  Hypothyroidism  SHAYAN on CPAP  Fibromyalgia/osteoarthritis:  Patient with myoclonic movements, seems related to gabapentin it was adjusted with resolution.  Nephrolithiasis  Basal cell/squamous CA of the skin  Urinary stress incontinence  Status post incisional hernia repairs  hospitalization as outlined below from severe GERD with hypoxemia and shortness of breath from a failed Nissen fundoplication from prior hiatal hernia repair.  Patient is due to have further testing with an EGD by GI and then subsequently thoracic surgery consultation for possible repair(however, according to the patient at this juncture she was felt I risk so no surgery is planned at this time)  In addition, she was placed on Protonix 40 b.i.d. and Pepcid 40 mg hs  Hospitalization on 07/14/2020 secondary confusion at home.  Apparently she had protracted hospital course in [place] following aspiration pneumonia.  Ten days in the ICU on a ventilator.  No overt infectious process.  Low probability for pulmonary embolus despite an elevated D-dimer.  Had mild leukocytosis/SIRS criteria subsequently discharged 1 day later when she clinically improved.  Symptoms were felt secondary to her protracted ICU course.   hospitalization with discharge on 05/18/21:  Epigastric pain following an EGD on 05/13/2021 for Botox injection at the pylorus for treatment for  gastroparesis.  Apparently associated with chocolate ingestion and possible cough with aspiration  Complicated by NIKOS with creatinine to 2.6 which improved with IV fluids TO 1.92 ON 05/18/2021   Hospitalization 04/13/2022:  With hypotension fatigue noted to have a low blood pressure mild increasing creatinine, dysuria treated with Bactrim but had an allergic reaction to it as an outpatient she received intravenous ceftriaxone and she was discharged on doxycycline.  Recently placed on spironolactone for blood pressure all medications were held decide from metoprolol.  Amlodipine was resumed, torsemide re-initiated, but spironolactone was stopped.  Maintain off hydralazine possibly add olmesartan  Creatinine initially as high as 2.27 reduce down to 2.16 upon discharge  Patient admitted 1/7/2023 secondary headache and confusion left temporal symptoms and temporal artery tenderness with an elevated ESR started empirically on steroids per neurology recommendation status post temporal artery biopsy.  However she developed a vesicular rash felt to have herpes zoster ophthalmologists started on Valtrex seen by ophthalmology eyedrops also initiated.  Patient discharged on Valtrex and high-dose gabapentin with follow-up with her primary care physician.  Patient with leukocytosis most compatible with steroids  Admitted 8/19/2023 after recent UTI history of urinary retention and straight catheterizes herself: Admitted with NIKOS from poor oral intake UTI with Serratia given IV fluids placed on ertapenem/change in mental status noted.  Urinary straight catheterizations recommended to be increased by urology.  Torsemide 5 mg every other day.  Admitted 9/9 with change in mental status status post recent discharge earlier in September with UTI.  Seen by geriatrics recommended formal cognitive testing as an outpatient.  ([place] multifactorial from chronic medical conditions?  Underlying dementia): Patient's course complicated by acute  kidney injury with a creatinine of 2.5 on admission diuretics were held gentle IV fluids creatinine back to baseline 2.07  Admitted 9/12/2023: COPD exacerbation treated with steroids nebulizers and oxygen.     Hospitalized 10/1/2024: With hematochezia underwent colonoscopy required clipping of Dieulafoy lesion at that time.  Subsequent colonoscopy 9/26 had 2 tubular adenomas removed as well as additional clipping to the lesion is noted.  Subsequent repeat Ace colonoscopy 9/3... Subsequently 2 more clips were placed reinforced the bleeding in the ascending colon and hemoglobin remained stable.  Abdominal discomfort follow-up with Dr. Mosqueda as of 3/10/2025     GI health maintenance: Please see above 9/30/2024         .          Medical History Reviewed by provider this encounter:     .  Past Medical History   Past Medical History[1]  Past Surgical History[2]  Family History[3]   reports that she quit smoking about 49 years ago. Her smoking use included cigarettes. She started smoking about 59 years ago. She has a 20 pack-year smoking history. She has been exposed to tobacco smoke. She has never used smokeless tobacco. She reports current alcohol use. She reports that she does not use drugs.  Current Outpatient Medications   Medication Instructions    acetaminophen (TYLENOL) 650 mg, As needed    albuterol (Ventolin HFA) 90 mcg/act inhaler 2 puffs, Inhalation, Every 6 hours PRN    allopurinol (ZYLOPRIM) 50 mg, Oral, 2 times weekly, DO NOT START UNTIL GOUT FLARE IS RESOLVED    amLODIPine (NORVASC) 10 mg, Oral, Daily    aspirin (ECOTRIN LOW STRENGTH) 81 mg, Oral, Daily    B-D ULTRAFINE III SHORT PEN 31G X 8 MM MISC     calcium acetate (PHOSLO) 667 mg, Oral, Daily after dinner    Calcium Citrate 500 mg, Oral, Daily    CoQ10 100 mg, Daily    desloratadine (CLARINEX) 5 mg, Oral, Daily    docusate sodium (COLACE) 100 mg, Daily PRN    escitalopram (LEXAPRO) 10 mg, Oral, Daily    gabapentin (NEURONTIN) 100 mg, As needed     "hydrALAZINE (APRESOLINE) 25 mg, Oral, 2 times daily, Take with 50 mg hydralazine daily    hydrALAZINE (APRESOLINE) 50 mg, Oral, 2 times daily    insulin aspart (NOVOLOG) 5 Units, 2 times daily with meals    levothyroxine 125 mcg, Oral, Daily    methenamine hippurate (HIPREX) 1 g, Oral, 2 times daily with meals    pantoprazole (PROTONIX) 40 mg, Oral, 2 times daily    pramipexole (MIRAPEX) 0.25 mg, Oral, Daily at bedtime    Probiotic Product (PROBIOTIC BLEND PO) 2 times daily    psyllium (METAMUCIL) 58.6 % packet 1 packet, As needed    rosuvastatin (CRESTOR) 5 mg, Oral, Daily    sucralfate (CARAFATE) 1 g, Oral, 3 times daily before meals    torsemide (DEMADEX) 10 mg, Oral, Daily PRN, Take 10 mg. Daily except Monday, Wednesday, Friday 20 mg.    Tresiba FlexTouch 10 Units, Daily at bedtime    Vitamin B12 1,000 mcg, Oral, Weekly    Vitamin D (Cholecalciferol) 2,000 Units, Daily   Allergies[4]   Medications Ordered Prior to Encounter[5]   Social History[6]     Objective   Ht 5' 3\" (1.6 m)   Wt 69.4 kg (153 lb)   BMI 27.10 kg/m²      Physical Exam  BP sitting on left: 152/70 with a heart rate of 68 and regular  BP standing left: 152/72 with a heart rate of 72 and regular  Physical Exam: General:  No acute distress/weak appearing  Skin:  No acute rash  Eyes:  No scleral icterus and noninjected  ENT:  Moist mucous membranes  Neck:  Supple, no jugular venous distention, trachea midline, overall appearance is normal  Chest:  Clear to auscultation  CVS:  Regular rate and rhythm, without a rub or gallops  Abdomen:  Normal bowel sounds, soft and nontender and nondistended  Extremities:  No edema, and no cyanosis, no significant arthritic changes  Neuro:  No gross focality/negative asterixis  Psych:  Alert and oriented and appropriate             [1]   Past Medical History:  Diagnosis Date    Actinic keratosis     Acute blood loss anemia 09/24/2024    Acute cystitis without hematuria 04/28/2023    Acute cystitis without " "hematuria 04/28/2023    Acute respiratory failure with hypoxia (HCC) 02/15/2024    Acute-on-chronic kidney injury  (HCC)     NIKOS (acute kidney injury) (HCC) 05/08/2020    Allergic     Allergic rhinitis     Ambulatory dysfunction 10/22/2020    AMS (altered mental status) 07/14/2020    Anesthesia     pt reports \"had to use double lumen endo tube for hiatal hernia repair/so surgeon could get to where he needed to work\"    Arthritis     Aspiration into airway     At high risk for falls 07/17/2024    Michael esophagus 1993    Lot of stress with children    Basal cell carcinoma 2007    left cheek     BCC (basal cell carcinoma) 05/27/2021    Left Nasal tip    Breast discharge 06/19/2023    Breast pain 06/19/2023    Cancer (HCC)     squamous cell cancer on forhead    Cancer (HCC)     basal cell on nose     Cholelithiasis     Chronic kidney disease 2000, 2018    Stones, kidney disease stage 4    Chronic pain disorder     bilat feet and joint pain on occas    Colon polyp     Confusion 10/30/2023    Constipation     COPD (chronic obstructive pulmonary disease) (HCC)     COVID-19 08/2021    recovered at home/did receive monoclonal infusion    COVID-19 virus infection 08/16/2021    Dental bridge present     Depression     Diabetes mellitus (HCC)     Type 2    Diabetic neuropathy (HCC)     Difficulty walking 2017    Disease of thyroid gland     Dyspnea     Elevated liver enzymes 04/04/2023    Epigastric abdominal pain with severe diabetic gastroparesis, status post EGD/Botox injection, 5/14 05/17/2021    Facial abrasion, initial encounter 03/22/2023    Fall 03/22/2023    Family history of thyroid problem     Fatty liver     Fibromyalgia, primary     Fracture of orbital floor, blow-out, right, closed, with routine healing, subsequent encounter 03/22/2023    Fracture of orbital floor, right side, initial encounter for closed fracture (HCC) 03/22/2023    GERD (gastroesophageal reflux disease)     Gout     Heart burn     Hiatal " hernia     History of cerebrovascular accident (CVA) due to ischemia 10/31/2023    10/23: MRI with foci of late acute infarct involving the right putamen       History of cholecystectomy 10/27/2023    History of colon polyps 07/30/2021    History of kidney stones 09/29/2020    Hx of recurrent kidney stones    History of kidney stones 09/29/2020    Hx of recurrent kidney stones  Formatting of this note might be different from the original. Hx of recurrent kidney stones  Last Assessment & Plan:  Formatting of this note might be different from the original. We will set her up for follow-up in 3 months with a KUB prior.  She knows to call if she has any kidney stone type pain in the meantime.    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 01/06/2023    Kidney problem     Kidney stone     Left hip pain 10/05/2023    Leukocytosis 07/14/2020    Memory loss Julu2 2020    Motion sickness     Motion sickness     Multiple closed fractures of ribs of right side 03/22/2023    Nasal bones, closed fracture 03/22/2023    Neck pain     Obesity 1978    Obesity (BMI 30.0-34.9)     Olecranon bursitis of right elbow 07/29/2024    Other chest pain 09/13/2021    Other fatigue 09/21/2023    Palpitations 07/18/2023    Peripheral neuropathy     Pollen allergies     Postural dizziness with presyncope 08/16/2023    Preoperative clearance 06/27/2019    Reactive depression 07/17/2024    RLS (restless legs syndrome)     S/P foot surgery 07/20/2022    SCC (squamous cell carcinoma) 05/04/2021    left mid forehead    SCC (squamous cell carcinoma) 2023    chest    Seasonal allergies     Shingles 01 05 23    Sleep apnea     has  inspire    Squamous cell skin cancer 2007    left cheek     Stroke (HCC)     Swollen ankles     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, left     great toe fusion    Transaminitis 06/03/2024    Urinary incontinence     Urinary tract infection 03/28/2022    Wears glasses    [2]   Past Surgical History:  Procedure Laterality Date    ABDOMINAL SURGERY  1997,2015,1972    Nissen x2 1972 tubal ligarion    ARTHROSCOPY KNEE Right     BREAST BIOPSY      stereotactic-benign    BREAST BIOPSY      stereo-benign    BREAST CYST EXCISION Bilateral     benign- done in [place]-neg    BREAST EXCISIONAL BIOPSY      unknown date-benign    BREAST EXCISIONAL BIOPSY      unknown date-benign    BREAST EXCISIONAL BIOPSY      unknown date-benign    BREAST EXCISIONAL BIOPSY      unknown age-benign    CARDIAC CATHETERIZATION      CATARACT EXTRACTION Right     CHOLECYSTECTOMY      COLONOSCOPY      EXAMINATION UNDER ANESTHESIA N/A 06/24/2021    Procedure: EXAM UNDER ANESTHESIA (EUA), DISE;  Surgeon: Gregory Maier MD;  [facility]: [facility];  Service: ENT    HERNIA REPAIR      hiatal    HIATAL HERNIA REPAIR      KNEE SURGERY Right     Torn maniscus lap surg    LIPOSUCTION      LYMPH NODE BIOPSY      MOHS SURGERY  05/20/2021    left mid forehead-Mickey    MOHS SURGERY Left 05/27/2021    Left nasal tip- mickey     WI LIGATION/BIOPSY TEMPORAL ARTERY Left 01/05/2023    Procedure: BIOPSY ARTERY TEMPORAL;  Surgeon: Alec Dennis DO;  [facility];  Service: Vascular    WI OPEN IMPLANTATION CRANIAL NERVE BOOM & PULSE GEN N/A 11/10/2021    Procedure: INSERTION UPPER AIRWAY STIMULATOR, INSPIRE IMPLANT;  Surgeon: Gregory Maier MD;  Location: [facility];  Service: ENT    WI UNLISTED PROCEDURE FOOT/TOES Right 07/19/2022    Procedure: 1st metatarsal phalangeal joint fusion;  Surgeon: Dede Bonner DPM;  Location: [facility];  Service: Podiatry    REDUCTION MAMMAPLASTY Bilateral 2000    REDUCTION MAMMAPLASTY      SKIN BIOPSY      SKIN CANCER  EXCISION  2007    squamous cell carcinoma     SKIN CANCER EXCISION  2007    basal cell carcinoma    SKIN CANCER EXCISION  2023    SCCIS chest    SQUAMOUS CELL CARCINOMA EXCISION      TOE SURGERY Right 08/04/2022    Right Great Toe Fusion    TONSILLECTOMY      TUBAL LIGATION      UPPER GASTROINTESTINAL ENDOSCOPY      US GUIDED BREAST BIOPSY RIGHT COMPLETE Right 07/18/2022   [3]   Family History  Problem Relation Name Age of Onset    Heart disease Mother Michelle Hopper     Depression Mother Michelle Hopper     Hypertension Mother Michelle Hopper     COPD Mother Michelle Hopper     Hearing loss Mother Michelle Hopper     Anxiety disorder Mother Michelle Hopper     Heart disease Father Jesus Hopper     Lung cancer Father Jesus Hopper 70        Smoker     Cancer Father Jesus Hopper         brain    Alcohol abuse Father Jesus Hopper     Dementia Father Jesus Hopper     Hypertension Father Jesus Hopper     Thyroid disease Father Jesus Hopper     COPD Father Jesus Hopper     Arthritis Father Jesus Hopper     Brain cancer Father Jesus Hopper 74    Brain cancer Sister Kiesha     Hypertension Sister Kiesha     Diabetes Sister Kiesha     Heart disease Sister Kiesha     Thyroid disease Sister Kiesha     Cancer Sister Kiesha         Lympoma, lung    Lung cancer Sister Kiesha 79    Anxiety disorder Sister Kiesha     Migraines Sister Kiesha     Hypertension Brother Mendoza Hopper     Diabetes Brother Mendoza Hopper     Cancer Brother Mendoza Hopper         Throat    Dementia Brother Mendoza Hopper     Stroke Brother Jesus, michael, dhaval     Hypertension Brother Jesus, michael, dhaval     Heart disease Brother [place]     Diabetes Brother Michael     Hypertension Brother Michael     Allergies Brother dhaval     No Known Problems Son      No Known Problems Son      Brain cancer Paternal Aunt          unknown age    Breast cancer Neg Hx     [4]   Allergies  Allergen Reactions    Ciprofloxacin Hives    Pollen Extract Allergic Rhinitis    Sulfamethoxazole-Trimethoprim Tongue Swelling   [5]    Current Outpatient Medications on File Prior to Visit   Medication Sig Dispense Refill    acetaminophen (TYLENOL) 500 mg tablet Take 650 mg by mouth as needed      albuterol (Ventolin HFA) 90 mcg/act inhaler Inhale 2 puffs every 6 (six) hours as needed for wheezing 54 g 0    allopurinol (ZYLOPRIM) 100 mg tablet Take 0.5 tablets (50 mg total) by mouth 2 (two) times a week DO NOT START UNTIL GOUT FLARE IS RESOLVED 12 tablet 0    amLODIPine (NORVASC) 10 mg tablet Take 1 tablet (10 mg total) by mouth daily (Patient taking differently: Take 10 mg by mouth daily at bedtime) 90 tablet 0    aspirin (ECOTRIN LOW STRENGTH) 81 mg EC tablet Take 1 tablet (81 mg total) by mouth daily Do not start before October 31, 2023.  0    B-D ULTRAFINE III SHORT PEN 31G X 8 MM MISC   3    calcium acetate (PHOSLO) capsule Take 1 capsule (667 mg total) by mouth daily after dinner 90 capsule 3    Calcium Citrate 250 MG TABS Take 2 tablets (500 mg total) by mouth in the morning (Patient taking differently: Take 500 mg by mouth daily at bedtime)      Coenzyme Q10 (CoQ10) 100 MG CAPS Take 100 mg by mouth in the morning Bed time      Cyanocobalamin (Vitamin B12) 1000 MCG TBCR Take 1 tablet (1,000 mcg total) by mouth once a week 12 tablet 0    desloratadine (CLARINEX) 5 MG tablet TAKE 1 TABLET (5 MG TOTAL) BY MOUTH DAILY. 90 tablet 1    docusate sodium (COLACE) 100 mg capsule Take 100 mg by mouth daily as needed for constipation      escitalopram (LEXAPRO) 10 mg tablet Take 1 tablet (10 mg total) by mouth daily 90 tablet 3    gabapentin (NEURONTIN) 100 mg capsule Take 100 mg by mouth if needed      insulin aspart (NovoLOG) 100 units/mL injection Inject 5 Units under the skin 2 (two) times a day with meals 5 units with lunch and dinner      insulin degludec (Tresiba FlexTouch) 100 units/mL injection pen Inject 10 Units under the skin daily at bedtime      levothyroxine 125 mcg tablet Take 1 tablet (125 mcg total) by mouth daily 90 tablet 0     methenamine hippurate (HIPREX) 1 g tablet Take 1 tablet (1 g total) by mouth 2 (two) times a day with meals 180 tablet 1    pantoprazole (PROTONIX) 40 mg tablet Take 1 tablet (40 mg total) by mouth 2 (two) times a day 60 tablet 5    pramipexole (MIRAPEX) 0.25 mg tablet Take 1 tablet (0.25 mg total) by mouth daily at bedtime 90 tablet 1    Probiotic Product (PROBIOTIC BLEND PO) Take by mouth in the morning and before bedtime.      psyllium (METAMUCIL) 58.6 % packet Take 1 packet by mouth if needed      rosuvastatin (CRESTOR) 5 mg tablet Take 1 tablet (5 mg total) by mouth daily 90 tablet 0    sucralfate (CARAFATE) 1 g tablet Take 1 tablet (1 g total) by mouth 3 (three) times a day before meals (Patient taking differently: Take 1 g by mouth 2 (two) times a day) 270 tablet 2    torsemide (DEMADEX) 10 mg tablet Take 1 tablet (10 mg total) by mouth daily as needed (lower extremity swelling) Take 10 mg. Daily except Monday, Wednesday, Friday 20 mg. (Patient taking differently: Take 10 mg by mouth daily as needed (lower extremity swelling) Taking only PRN) 150 tablet 1    Vitamin D, Cholecalciferol, 25 MCG (1000 UT) TABS Take 2,000 Units by mouth in the morning (Patient taking differently: Take 1,200 Units by mouth in the morning)      [DISCONTINUED] hydrALAZINE (APRESOLINE) 25 mg tablet Take 2 tablets (50 mg total) by mouth in the morning and 2 tablets (50 mg total) before bedtime. 180 tablet 3    hydrALAZINE (APRESOLINE) 50 mg tablet Take 50 mg by mouth in the morning and 50 mg before bedtime.       No current facility-administered medications on file prior to visit.   [6]   Social History  Tobacco Use    Smoking status: Former     Current packs/day: 0.00     Average packs/day: 2.0 packs/day for 10.0 years (20.0 ttl pk-yrs)     Types: Cigarettes     Start date: 1966     Quit date: 1976     Years since quittin.4     Passive exposure: Past    Smokeless tobacco: Never    Tobacco comments:     Smoked 2 pack a  day   Vaping Use    Vaping status: Never Used   Substance and Sexual Activity    Alcohol use: Yes     Comment: 1 or 2 a year    Drug use: No    Sexual activity: Not Currently     Partners: Male     Birth control/protection: Abstinence, Post-menopausal, Female Sterilization     Comment: defer

## 2025-06-09 NOTE — PROGRESS NOTES
Name: Trina Davis      : 1950      MRN: [id number]  Encounter Provider: Tree Aguilar MD  Encounter Date: 2025   Encounter department: [facility] INTERNAL MEDICINE [place]  :  Assessment & Plan  Medicare annual wellness visit, subsequent  See below discussion.       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    Stop Metoprolol (HR 50).  No clear CV indication for BB.   Patient noticed some improvement in her general fatigue with lowering dose of BB in the past.    Increase hydralazine to 50 mg BID  Continue amlodipine 10 mg daily    CKD (chronic kidney disease) stage 5, GFR less than 15 ml/min ([facility])  Lab Results   Component Value Date    EGFR 7 2025    EGFR 10 04/15/2025    EGFR 9 2025    CREATININE 5.23 (H) 2025    CREATININE 4.00 (H) 04/15/2025    CREATININE 4.26 (H) 2025   I had a long discussion with Trina today regarding worsening renal function.  Overall, she feels very fatigued.  No nausea or vomiting, though does endorse a decreased appetite. +mild uremic symptoms. She is still making urine and her volume status is stable; she has not had to use her torsemide recently and was instructed by Nephrology the other day to hold torsemide.  She understands that life expectancy and prognosis is guarded to maybe few months with progression of her CKD to stage 5 with acute decline in renal function.  She again has made her goals very clear that she is not interested in any form of renal replacement therapy. Trina expressed desire to  focus on symptom management if symptom burden were to worsen, though in the interim, wants to continue with trial of medical management to  stabilize the current decline if possible though understands this may not be feasible.  She is interested in discussing hospice.  I have placed referral for palliative care which patient is amenable to.  Advanced care planning documents/HCA active in EMR.    We discussed low K  and low Mag diet  Maintain adequate hydration  Go to ER in the interim if developing chest pain or shortness of breath  Only take torsemide if gain 2-3lbs in 1-2 days or 5lbs in a week.  BMP     Orders:  •  Ambulatory Referral to Palliative Care; Future    Hypermagnesemia  Mg 2.9.  Patient stopped her Mag supplement the other day.    Orders:  •  Magnesium; Future       Preventive health issues were discussed with patient, and age appropriate screening tests were ordered as noted in patient's After Visit Summary. Personalized health advice and appropriate referrals for health education or preventive services given if needed, as noted in patient's After Visit Summary.    History of Present Illness     Patient feels fatigue.  No nausea or vomiting, though does endorse decreased appetite.  Staying hydrated.  Feels cold over the last week or so.         Patient Care Team:  Tree Aguilar MD as PCP - General (Internal Medicine)  Francisco Hull MD (Urology)  Melody Singh DO as Consulting Physician (Endocrinology)  Donaldo Baires MD (Nephrology)    Review of Systems   Constitutional:  Positive for activity change, appetite change and fatigue. Negative for diaphoresis and fever.   Respiratory:  Negative for shortness of breath.    Cardiovascular:  Negative for chest pain, palpitations and leg swelling.   Gastrointestinal:  Negative for diarrhea, nausea and vomiting.   Endocrine: Positive for cold intolerance.     Medical History Reviewed by provider this encounter:  [facility]       Annual Wellness Visit Questionnaire   Trina is here for her Subsequent Wellness visit.     Health Risk Assessment:   Patient rates overall health as poor. Patient feels that  their physical health rating is slightly worse. Patient is dissatisfied with their life. Eyesight was rated as slightly worse. Hearing was rated as same. Patient feels that their emotional and mental health rating is same. Patients states they are sometimes angry. Patient states they are often unusually tired/fatigued. Pain experienced in the last 7 days has been a lot. Patient's pain rating has been 5/10. Patient states that she has experienced no weight loss or gain in last 6 months.     Depression Screening:   PHQ-2 Score: 0      Fall Risk Screening:   In the past year, patient has experienced: no history of falling in past year      Urinary Incontinence Screening:   Patient has leaked urine accidently in the last six months.     Home Safety:  Patient has trouble with stairs inside or outside of their home. Patient has working smoke alarms and has working carbon monoxide detector. Home safety hazards include: none.     Nutrition:   Current diet is Diabetic and No Added Salt.     Medications:   Patient is currently taking over-the-counter supplements. OTC medications include: see medication list. Patient is able to manage medications.     Activities of Daily Living (ADLs)/Instrumental Activities of Daily Living (IADLs):   Walk and transfer into and out of bed and chair?: Yes  Dress and groom yourself?: Yes    Bathe or shower yourself?: Yes    Feed yourself? Yes  Do your laundry/housekeeping?: No  Manage your money, pay your bills and track your expenses?: No  Make your own meals?: Yes    Do your own shopping?: No    Durable Medical Equipment Suppliers  Cane    Previous Hospitalizations:   Any hospitalizations or ED visits within the last 12 months?: Yes    How many hospitalizations have you had in the last year?: 1-2    Hospitalization Comments: Anxiety, shortness of breath    Advance Care Planning:   Living will: Yes    Durable POA for healthcare: Yes    Advanced directive: Yes      Preventive Screenings       Cardiovascular Screening:    General: Screening Not Indicated and History Lipid Disorder      Diabetes Screening:     General: Screening Not Indicated and History Diabetes      Breast Cancer Screening:     General: Screening Current      Cervical Cancer Screening:    General: Screening Not Indicated      Osteoporosis Screening:    General: Screening Not Indicated and History Osteoporosis      Lung Cancer Screening:     General: Screening Not Indicated      Hepatitis C Screening:    General: Screening Current    Screening, Brief Intervention, and Referral to Treatment (SBIRT)     Screening  Typical number of drinks in a day: 0  Typical number of drinks in a week: 0  Interpretation: Low risk drinking behavior.    AUDIT-C Screenin) How often did you have a drink containing alcohol in the past year? monthly or less  2) How many drinks did you have on a typical day when you were drinking in the past year? 0  3) How often did you have 6 or more drinks on one occasion in the past year? never    AUDIT-C Score: 1  Interpretation: Score 0-2 (female): Negative screen for alcohol misuse    Single Item Drug Screening:  How often have you used an illegal drug (including marijuana) or a prescription medication for non-medical reasons in the past year? never    Single Item Drug Screen Score: 0  Interpretation: Negative screen for possible drug use disorder    Social Drivers of Health     Financial Resource Strain: Low Risk  (11/10/2023)    Received from [facility]    Overall Financial Resource Strain (CARDIA)    • Difficulty of Paying Living Expenses: Not very hard   Food Insecurity: No Food Insecurity (2025)    Nursing - Inadequate Food Risk Classification    • Worried About Running Out of Food in the Last Year: Never true    • Ran Out of Food in the Last Year: Never true   Transportation Needs: No Transportation Needs (2025)    PRAPARE - Transportation    • Lack of Transportation (Medical): No    •  "Lack of Transportation (Non-Medical): No   Housing Stability: Low Risk  (6/9/2025)    Housing Stability Vital Sign    • Unable to Pay for Housing in the Last Year: No    • Number of Times Moved in the Last Year: 0    • Homeless in the Last Year: No   Utilities: Not At Risk (6/9/2025)    [facility] Utilities    • Threatened with loss of utilities: No     No results found.    Objective   /66 (BP Location: Left arm, Patient Position: Sitting, Cuff Size: Adult)   Pulse (!) 52   Temp 97.6 °F (36.4 °C) (Tympanic)   Resp 16   Ht 5' 3\" (1.6 m)   Wt 70.3 kg (155 lb)   SpO2 97%   BMI 27.46 kg/m²     Physical Exam  Vitals reviewed.   Constitutional:       General: She is not in acute distress.     Appearance: She is ill-appearing. She is not toxic-appearing.     Cardiovascular:      Rate and Rhythm: Regular rhythm. Bradycardia present.   Pulmonary:      Effort: No respiratory distress.      Breath sounds: No wheezing, rhonchi or rales.   Abdominal:      General: There is no distension.      Palpations: There is no mass.      Tenderness: There is no abdominal tenderness. There is no guarding.     Musculoskeletal:         General: No swelling.     Skin:     General: Skin is warm and dry.      Coloration: Skin is pale.     Neurological:      Mental Status: Mental status is at baseline.         "

## 2025-06-09 NOTE — ASSESSMENT & PLAN NOTE
Worsening/advancing disease:  Current uremic symptoms mild will monitor for now  Current treatment approach no dialysis   Orders:     kidney and bladder with pvr; Future    Basic metabolic panel; Future    Comprehensive metabolic panel; Future    CBC; Future    Magnesium; Future    Phosphorus; Future    Protein / creatinine ratio, urine; Future    PTH, intact; Future

## 2025-06-09 NOTE — ASSESSMENT & PLAN NOTE
Orders:  •  hydrALAZINE (APRESOLINE) 25 mg tablet; Take 2 tablets (50 mg total) by mouth in the morning and 2 tablets (50 mg total) before bedtime.

## 2025-06-09 NOTE — ASSESSMENT & PLAN NOTE
Acceptable level just treat vitamin D deficiency  Orders:    US kidney and bladder with pvr; Future    hydrALAZINE (APRESOLINE) 25 mg tablet; Take 1 tablet (25 mg total) by mouth in the morning and 1 tablet (25 mg total) before bedtime. Take with 50 mg hydralazine daily.    Basic metabolic panel; Future    Comprehensive metabolic panel; Future    CBC; Future    Magnesium; Future    Phosphorus; Future    Protein / creatinine ratio, urine; Future    PTH, intact; Future

## 2025-06-09 NOTE — ASSESSMENT & PLAN NOTE
Stable hemoglobin no changes    Orders:    US kidney and bladder with pvr; Future    Basic metabolic panel; Future    Comprehensive metabolic panel; Future    CBC; Future    Magnesium; Future    Phosphorus; Future    Protein / creatinine ratio, urine; Future    PTH, intact; Future

## 2025-06-09 NOTE — ASSESSMENT & PLAN NOTE
Treat  Orders:    US kidney and bladder with pvr; Future    Basic metabolic panel; Future    Comprehensive metabolic panel; Future    CBC; Future    Magnesium; Future    Phosphorus; Future    Protein / creatinine ratio, urine; Future    PTH, intact; Future

## 2025-06-09 NOTE — ASSESSMENT & PLAN NOTE
Has been at goal no changes  Orders:    US kidney and bladder with pvr; Future    Basic metabolic panel; Future    Comprehensive metabolic panel; Future    CBC; Future    Magnesium; Future    Phosphorus; Future    Protein / creatinine ratio, urine; Future    PTH, intact; Future

## 2025-06-09 NOTE — ASSESSMENT & PLAN NOTE
Lab Results   Component Value Date    EGFR 7 06/05/2025    EGFR 10 04/15/2025    EGFR 9 03/21/2025    CREATININE 5.23 (H) 06/05/2025    CREATININE 4.00 (H) 04/15/2025    CREATININE 4.26 (H) 03/21/2025

## 2025-06-09 NOTE — ASSESSMENT & PLAN NOTE
Current status doing well just use torsemide as needed  Orders:    US kidney and bladder with pvr; Future    Basic metabolic panel; Future    Comprehensive metabolic panel; Future    CBC; Future    Magnesium; Future    Phosphorus; Future    Protein / creatinine ratio, urine; Future    PTH, intact; Future

## 2025-06-10 ENCOUNTER — TELEPHONE (OUTPATIENT)
Age: 75
End: 2025-06-10

## 2025-06-10 ENCOUNTER — APPOINTMENT (OUTPATIENT)
Dept: LAB | Facility: AMBULARY SURGERY CENTER | Age: 75
End: 2025-06-10
Attending: INTERNAL MEDICINE
Payer: MEDICARE

## 2025-06-10 DIAGNOSIS — E83.41 HYPERMAGNESEMIA: ICD-10-CM

## 2025-06-10 DIAGNOSIS — N18.5 STAGE 5 CHRONIC KIDNEY DISEASE (HCC): ICD-10-CM

## 2025-06-10 LAB
ANION GAP SERPL CALCULATED.3IONS-SCNC: 11 MMOL/L (ref 4–13)
BUN SERPL-MCNC: 60 MG/DL (ref 5–25)
CALCIUM SERPL-MCNC: 9.3 MG/DL (ref 8.4–10.2)
CHLORIDE SERPL-SCNC: 102 MMOL/L (ref 96–108)
CO2 SERPL-SCNC: 24 MMOL/L (ref 21–32)
CREAT SERPL-MCNC: 4.78 MG/DL (ref 0.6–1.3)
GFR SERPL CREATININE-BSD FRML MDRD: 8 ML/MIN/1.73SQ M
GLUCOSE P FAST SERPL-MCNC: 108 MG/DL (ref 65–99)
MAGNESIUM SERPL-MCNC: 2.7 MG/DL (ref 1.9–2.7)
POTASSIUM SERPL-SCNC: 3.9 MMOL/L (ref 3.5–5.3)
SODIUM SERPL-SCNC: 137 MMOL/L (ref 135–147)

## 2025-06-10 PROCEDURE — 83735 ASSAY OF MAGNESIUM: CPT

## 2025-06-10 PROCEDURE — 80048 BASIC METABOLIC PNL TOTAL CA: CPT

## 2025-06-10 PROCEDURE — 36415 COLL VENOUS BLD VENIPUNCTURE: CPT

## 2025-06-10 NOTE — TELEPHONE ENCOUNTER
I called Trina in response to a referral that was received for patient to establish care with Palliative Care     Outreach was made to schedule a consultation.     A consultation was scheduled for patient during this call. Patient is scheduled on 7/18/25 at 8:30 AM with Dr Escalona at the [facility]  Has the patient been seen inpatient or outpatient ? (Within in the last 3 years) No If so when, N/A     Pt has a STAT referral, pt is scheduled for first available appt at requested location. Please advise if there is an earlier appt available for this pt.

## 2025-06-12 ENCOUNTER — OFFICE VISIT (OUTPATIENT)
Dept: NEPHROLOGY | Facility: CLINIC | Age: 75
End: 2025-06-12
Payer: MEDICARE

## 2025-06-12 ENCOUNTER — HOSPITAL ENCOUNTER (OUTPATIENT)
Dept: ULTRASOUND IMAGING | Facility: HOSPITAL | Age: 75
Discharge: HOME | End: 2025-06-12
Attending: INTERNAL MEDICINE
Payer: MEDICARE

## 2025-06-12 VITALS — HEIGHT: 63 IN | WEIGHT: 153 LBS | BODY MASS INDEX: 27.11 KG/M2

## 2025-06-12 DIAGNOSIS — I10 ESSENTIAL HYPERTENSION: ICD-10-CM

## 2025-06-12 DIAGNOSIS — D63.1 ANEMIA IN STAGE 5 CHRONIC KIDNEY DISEASE, NOT ON CHRONIC DIALYSIS  (HCC): Chronic | ICD-10-CM

## 2025-06-12 DIAGNOSIS — N18.5 ANEMIA IN STAGE 5 CHRONIC KIDNEY DISEASE, NOT ON CHRONIC DIALYSIS  (HCC): Chronic | ICD-10-CM

## 2025-06-12 DIAGNOSIS — E11.22 TYPE 2 DIABETES MELLITUS WITH STAGE 5 CHRONIC KIDNEY DISEASE NOT ON CHRONIC DIALYSIS, UNSPECIFIED WHETHER LONG TERM INSULIN USE (HCC): Primary | ICD-10-CM

## 2025-06-12 DIAGNOSIS — R80.1 PERSISTENT PROTEINURIA: ICD-10-CM

## 2025-06-12 DIAGNOSIS — R60.0 EDEMA OF EXTREMITY: ICD-10-CM

## 2025-06-12 DIAGNOSIS — N25.81 SECONDARY HYPERPARATHYROIDISM OF RENAL ORIGIN (HCC): Chronic | ICD-10-CM

## 2025-06-12 DIAGNOSIS — N18.5 TYPE 2 DIABETES MELLITUS WITH STAGE 5 CHRONIC KIDNEY DISEASE NOT ON CHRONIC DIALYSIS, UNSPECIFIED WHETHER LONG TERM INSULIN USE (HCC): Primary | ICD-10-CM

## 2025-06-12 DIAGNOSIS — E11.22 TYPE 2 DIABETES MELLITUS WITH STAGE 5 CHRONIC KIDNEY DISEASE NOT ON CHRONIC DIALYSIS, UNSPECIFIED WHETHER LONG TERM INSULIN USE (HCC): ICD-10-CM

## 2025-06-12 DIAGNOSIS — N18.5 STAGE 5 CHRONIC KIDNEY DISEASE NOT ON CHRONIC DIALYSIS (HCC): ICD-10-CM

## 2025-06-12 DIAGNOSIS — E55.9 VITAMIN D DEFICIENCY: Chronic | ICD-10-CM

## 2025-06-12 DIAGNOSIS — N18.5 TYPE 2 DIABETES MELLITUS WITH STAGE 5 CHRONIC KIDNEY DISEASE NOT ON CHRONIC DIALYSIS, UNSPECIFIED WHETHER LONG TERM INSULIN USE (HCC): ICD-10-CM

## 2025-06-12 DIAGNOSIS — E78.2 MIXED HYPERLIPIDEMIA: ICD-10-CM

## 2025-06-12 DIAGNOSIS — I12.0 BENIGN HYPERTENSIVE KIDNEY DISEASE WITH CHRONIC KIDNEY DISEASE STAGE V OR END STAGE RENAL DISEASE (HCC): ICD-10-CM

## 2025-06-12 DIAGNOSIS — M10.372 ACUTE GOUT DUE TO RENAL IMPAIRMENT INVOLVING LEFT FOOT: ICD-10-CM

## 2025-06-12 DIAGNOSIS — I12.9 PARENCHYMAL RENAL HYPERTENSION, STAGE 1 THROUGH STAGE 4 OR UNSPECIFIED CHRONIC KIDNEY DISEASE: ICD-10-CM

## 2025-06-12 PROCEDURE — 99215 OFFICE O/P EST HI 40 MIN: CPT | Performed by: INTERNAL MEDICINE

## 2025-06-12 PROCEDURE — 76770 US EXAM ABDO BACK WALL COMP: CPT

## 2025-06-12 PROCEDURE — G2211 COMPLEX E/M VISIT ADD ON: HCPCS | Performed by: INTERNAL MEDICINE

## 2025-06-12 RX ORDER — GABAPENTIN 100 MG/1
100 CAPSULE ORAL AS NEEDED
COMMUNITY

## 2025-06-12 RX ORDER — DOCUSATE SODIUM 100 MG/1
100 CAPSULE, LIQUID FILLED ORAL DAILY PRN
COMMUNITY

## 2025-06-12 RX ORDER — HYDRALAZINE HYDROCHLORIDE 50 MG/1
50 TABLET, FILM COATED ORAL 2 TIMES DAILY
COMMUNITY

## 2025-06-12 RX ORDER — HYDRALAZINE HYDROCHLORIDE 25 MG/1
25 TABLET, FILM COATED ORAL 2 TIMES DAILY
Qty: 180 TABLET | Refills: 3 | Status: SHIPPED | OUTPATIENT
Start: 2025-06-12

## 2025-06-12 NOTE — LETTER
2025     Tree Aguilar MD  [address] [place] [id number]    Patient: Trina Davis   YOB: 1950   Date of Visit: 2025       Dear Dr. Tree Aguilar MD:    Thank you for referring Trina Davis to me for evaluation. Below are my notes for this consultation.    If you have questions, please do not hesitate to call me. I look forward to following your patient along with you.         Sincerely,        Donaldo Baires MD        CC: No Recipients    Donaldo Baires MD  2025  2:28 PM  Sign when Signing Visit  Name: Trina Davis      : 1950      MRN: [id number]  Encounter Provider: Donaldo Baires MD  Encounter Date: 2025   Encounter department: [facility] [place]  :  Assessment & Plan  Type 2 diabetes mellitus with stage 5 chronic kidney disease not on chronic dialysis, unspecified whether long term insulin use (HCC)  Avoid SGLT2 inhibitors with frequent urinary tract infections    Benign hypertensive kidney disease with chronic kidney disease stage V or end stage renal disease (HCC)  Still elevated increase hydralazine 75 mg twice a day  Orders:  •  US kidney and bladder with pvr; Future  •  Basic metabolic panel; Future  •  Comprehensive metabolic panel; Future  •  CBC; Future  •  Magnesium; Future  •  Phosphorus; Future  •  Protein / creatinine ratio, urine; Future  •  PTH, intact; Future    Stage 5 chronic kidney disease not on chronic dialysis (HCC)  Worsening/advancing disease:  Current uremic symptoms mild will monitor for now  Current treatment approach no dialysis   Orders:  •  US kidney and bladder with pvr; Future  •  Basic metabolic panel; Future  •  Comprehensive metabolic panel; Future  •  CBC; Future  •  Magnesium; Future  •  Phosphorus; Future  •  Protein / creatinine ratio, urine; Future  •  PTH, intact; Future    Persistent proteinuria  Worsening treat conservatively limited options given advanced kidney disease  Orders:  •  US kidney  and bladder with pvr; Future  •  Basic metabolic panel; Future  •  Comprehensive metabolic panel; Future  •  CBC; Future  •  Magnesium; Future  •  Phosphorus; Future  •  Protein / creatinine ratio, urine; Future  •  PTH, intact; Future    Anemia in stage 5 chronic kidney disease, not on chronic dialysis  (HCC)  Stable hemoglobin no changes    Orders:  •  US kidney and bladder with pvr; Future  •  Basic metabolic panel; Future  •  Comprehensive metabolic panel; Future  •  CBC; Future  •  Magnesium; Future  •  Phosphorus; Future  •  Protein / creatinine ratio, urine; Future  •  PTH, intact; Future    Secondary hyperparathyroidism of renal origin (HCC)  Acceptable level just treat vitamin D deficiency  Orders:  •  US kidney and bladder with pvr; Future  •  hydrALAZINE (APRESOLINE) 25 mg tablet; Take 1 tablet (25 mg total) by mouth in the morning and 1 tablet (25 mg total) before bedtime. Take with 50 mg hydralazine daily.  •  Basic metabolic panel; Future  •  Comprehensive metabolic panel; Future  •  CBC; Future  •  Magnesium; Future  •  Phosphorus; Future  •  Protein / creatinine ratio, urine; Future  •  PTH, intact; Future    Edema of extremity  Current status doing well just use torsemide as needed  Orders:  •   kidney and bladder with pvr; Future  •  Basic metabolic panel; Future  •  Comprehensive metabolic panel; Future  •  CBC; Future  •  Magnesium; Future  •  Phosphorus; Future  •  Protein / creatinine ratio, urine; Future  •  PTH, intact; Future    Vitamin D deficiency  Treat  Orders:  •  US kidney and bladder with pvr; Future  •  Basic metabolic panel; Future  •  Comprehensive metabolic panel; Future  •  CBC; Future  •  Magnesium; Future  •  Phosphorus; Future  •  Protein / creatinine ratio, urine; Future  •  PTH, intact; Future    Mixed hyperlipidemia  Has been at goal no changes  Orders:  •  US kidney and bladder with pvr; Future  •  Basic metabolic panel; Future  •  Comprehensive metabolic panel;  "Future  •  CBC; Future  •  Magnesium; Future  •  Phosphorus; Future  •  Protein / creatinine ratio, urine; Future  •  PTH, intact; Future    Essential hypertension    Orders:  •  hydrALAZINE (APRESOLINE) 25 mg tablet; Take 1 tablet (25 mg total) by mouth in the morning and 1 tablet (25 mg total) before bedtime. Take with 50 mg hydralazine daily.  •  Basic metabolic panel; Future  •  Comprehensive metabolic panel; Future  •  CBC; Future  •  Magnesium; Future  •  Phosphorus; Future  •  Protein / creatinine ratio, urine; Future  •  PTH, intact; Future    Parenchymal renal hypertension, stage 1 through stage 4 or unspecified chronic kidney disease  Lab Results   Component Value Date    EGFR 8 06/10/2025    EGFR 7 06/05/2025    EGFR 10 04/15/2025    CREATININE 4.78 (H) 06/10/2025    CREATININE 5.23 (H) 06/05/2025    CREATININE 4.00 (H) 04/15/2025       Orders:  •  hydrALAZINE (APRESOLINE) 25 mg tablet; Take 1 tablet (25 mg total) by mouth in the morning and 1 tablet (25 mg total) before bedtime. Take with 50 mg hydralazine daily.  •  Basic metabolic panel; Future  •  Comprehensive metabolic panel; Future  •  CBC; Future  •  Magnesium; Future  •  Phosphorus; Future  •  Protein / creatinine ratio, urine; Future  •  PTH, intact; Future        History of Present Illness  HPI  Trina Davis is a 75 y.o. female who presents with follow-up regarding CKD 5  There has been no hospitalizations or acute illnesses since last visit.  The patient complaining of being \"off slightly shaky and dull \"  Poor appetite and poor energy definitely not feeling as well as when I last saw her overall  No significant restless leg she does take as needed gabapentin  Occasional sleep during the day but not too bad  No fevers, chills, or cough or colds except for postnasal drip  No hematuria, dysuria, does have difficulty voiding at times this is somewhat chronic, bubbles in the urine unchanged  No nausea or vomiting, some difficulty eating large " portions she gets some discomfort in the abdomen, occasional loose stools  No chest pain, but does have dyspnea on exertion somewhat worse, will get some leg edema unless she keeps them elevated then not an issue  Current headache/pressure, does get dizziness or lightheadedness; also separately some imbalance  Blood pressure medications:  Amlodipine 10 mg at bedtime  Hydralazine 50 mg twice a day  Torsemide 80 mg only as needed she has not taken it for 1 week    Renal pertinent medications:  Allopurinol 100 mg twice a week for gout prevention  Calcium citrate 500 mg at bedtime  PhosLo 667 mg after dinner  Gabapentin only as needed  Pantoprazole 40 mg twice a day  Vitamin D 1200 mg daily  Aspirin 81 mg daily   fate 1 g twice a day for inflammatory polyps in the body of the stomach      Review of Systems  Please see HPI, otherwise the review of systems as completely reviewed with the patient are negative    Pertinent Medical History    1.  CKD stage 5.:  Etiology:  Diabetic nephropathy/hypertensive nephrosclerosis/arteriolar nephrosclerosis/chronic NSAID use.  No evidence of obstructive uropathy, renal artery disease or primary glomerular disease with a bland urinalysis  Of note, CPK was normal at 105 no evidence rhabdomyolysis.  Baseline creatinine: 4 but advancing  Current creatinine 5.23 from 6/5 now 4.78 from 6/10/2025 somewhat improved  Urine protein creatinine ratio: 6.6 g higher once again  UA demonstrated no significant hematuria but 3+ proteinuria from  Serologies:  Normal C3/C4; negative rheumatoid factor; negative SPEP:  Negative UPEP;  Light chain ratio 2.09 essentially normal for chronic kidney disease  negative ELOY; negative hepatitis-C; normal IgA; normal ASO; negative RPR     Renal ultrasound no hydronephrosis small bilateral renal simple cyst left upper pole nonobstructing calculi okay    Will repeat renal ultrasound some difficulty voiding just to make sure this is not an issue as of  6/12/2025        Recommendations:  Treat hypertension-please see below  Treat dyslipidemia-please see below  Maintain proteinuria less than 1 g or as low as possible: Adjust antihypertensive regimen in order to try to get to proteinuria less than 1 g please see below.  At this time just treat conservatively please see below as mentioned  Avoid nephrotoxic agents such as NSAIDs, patient counseled as such  S/p kidney smart  ACP meeting completed actually no dialysis however.  Reviewed with the patient as of 3/10/2025: She would never want to do in center hemodialysis PD would be her preference but at this juncture she reviewed it again and would not want dialysis would prefer to die peacefully at that time.      Discussed at length with Trina and her  at this juncture she absolutely does not want kidney machine dialysis understands this will mean eventual death.  For the moment she would like to continue with treatment however when she hits a point where she is really not feeling well she may be willing to go onto hospice.  She has a palliative care consultation July 17 to help transition as needed.       2.  Volume:   Current status:  Euvolemic   Torsemide dose: Currently on torsemide 10 mg as needed     3.  Hypertension:  Workup:  saline suppression test for primary aldosterone state was normal  plasma free metanephrines was negative  renal artery duplex was negative 02/2019       Current blood pressure averages:   AM: 174/80, standing 153/75  PM: 173/78, standing 150/67  Heart rate: 50-60 range        Goal blood pressure:  less than 125/75 given less than 1 g of proteinuria at this time  Recommendations:  Push nonmedical regimen including weight loss, isotonic exercise and avoidance of salt; patient counseled as such  Medication changes today:   Increase hydralazine 75 mg twice a day  Recheck blood pressures 2 to 3 weeks     4.  Electrolytes:   All acceptable including potassium 3.9     5.  Mineral bone  disorder:  Calcium/magnesium/phosphorus:  All acceptable except for elevated magnesium avoid products (currently off all magnesium products)  PTH intact: 105.7 elevated but avoid overtreatment just replete vitamin D this is from 6/5/2025   Vitamin-D: 25.2 replete from 6/5/2025      6.  Dyslipidemia:  Goal LDL:  Less than 100  Current lipid profile: LDL 25/HDL 34/triglycerides 331 from 4/8/24  Recommendations:  Per Endocrinology but essentially at goal        7.  Anemia:   Current hemoglobin 11.0 stable from 6/5/2025  Followed closely by GI  Studies from 1/30/2025: Saturation 27% ferritin only 28 recommend oral iron 3 days a week      8.  Other problems:  Depression  Diabetes mellitus per primary medical physician: I would avoid SGLT2 inhibitors at the moment with frequent urinary tract infections  Hypothyroidism  SHAYAN on CPAP  Fibromyalgia/osteoarthritis:  Patient with myoclonic movements, seems related to gabapentin it was adjusted with resolution.  Nephrolithiasis  Basal cell/squamous CA of the skin  Urinary stress incontinence  Status post incisional hernia repairs  hospitalization as outlined below from severe GERD with hypoxemia and shortness of breath from a failed Nissen fundoplication from prior hiatal hernia repair.  Patient is due to have further testing with an EGD by GI and then subsequently thoracic surgery consultation for possible repair(however, according to the patient at this juncture she was felt I risk so no surgery is planned at this time)  In addition, she was placed on Protonix 40 b.i.d. and Pepcid 40 mg hs  Hospitalization on 07/14/2020 secondary confusion at home.  Apparently she had protracted hospital course in [place] following aspiration pneumonia.  Ten days in the ICU on a ventilator.  No overt infectious process.  Low probability for pulmonary embolus despite an elevated D-dimer.  Had mild leukocytosis/SIRS criteria subsequently discharged 1 day later when she clinically  improved.  Symptoms were felt secondary to her protracted ICU course.   hospitalization with discharge on 05/18/21:  Epigastric pain following an EGD on 05/13/2021 for Botox injection at the pylorus for treatment for gastroparesis.  Apparently associated with chocolate ingestion and possible cough with aspiration  Complicated by NIKOS with creatinine to 2.6 which improved with IV fluids TO 1.92 ON 05/18/2021   Hospitalization 04/13/2022:  With hypotension fatigue noted to have a low blood pressure mild increasing creatinine, dysuria treated with Bactrim but had an allergic reaction to it as an outpatient she received intravenous ceftriaxone and she was discharged on doxycycline.  Recently placed on spironolactone for blood pressure all medications were held decide from metoprolol.  Amlodipine was resumed, torsemide re-initiated, but spironolactone was stopped.  Maintain off hydralazine possibly add olmesartan  Creatinine initially as high as 2.27 reduce down to 2.16 upon discharge  Patient admitted 1/7/2023 secondary headache and confusion left temporal symptoms and temporal artery tenderness with an elevated ESR started empirically on steroids per neurology recommendation status post temporal artery biopsy.  However she developed a vesicular rash felt to have herpes zoster ophthalmologists started on Valtrex seen by ophthalmology eyedrops also initiated.  Patient discharged on Valtrex and high-dose gabapentin with follow-up with her primary care physician.  Patient with leukocytosis most compatible with steroids  Admitted 8/19/2023 after recent UTI history of urinary retention and straight catheterizes herself: Admitted with NIKOS from poor oral intake UTI with Serratia given IV fluids placed on ertapenem/change in mental status noted.  Urinary straight catheterizations recommended to be increased by urology.  Torsemide 5 mg every other day.  Admitted 9/9 with change in mental status status post recent discharge  earlier in September with UTI.  Seen by geriatrics recommended formal cognitive testing as an outpatient.  (Odin multifactorial from chronic medical conditions?  Underlying dementia): Patient's course complicated by acute kidney injury with a creatinine of 2.5 on admission diuretics were held gentle IV fluids creatinine back to baseline 2.07  Admitted 9/12/2023: COPD exacerbation treated with steroids nebulizers and oxygen.     Hospitalized 10/1/2024: With hematochezia underwent colonoscopy required clipping of Dieulafoy lesion at that time.  Subsequent colonoscopy 9/26 had 2 tubular adenomas removed as well as additional clipping to the lesion is noted.  Subsequent repeat Ace colonoscopy 9/3... Subsequently 2 more clips were placed reinforced the bleeding in the ascending colon and hemoglobin remained stable.  Abdominal discomfort follow-up with Dr. Mosqueda as of 3/10/2025     GI health maintenance: Please see above 9/30/2024         .          Medical History Reviewed by provider this encounter:     .  Past Medical History  Past Medical History[1]  Past Surgical History[2]  Family History[3]   reports that she quit smoking about 49 years ago. Her smoking use included cigarettes. She started smoking about 59 years ago. She has a 20 pack-year smoking history. She has been exposed to tobacco smoke. She has never used smokeless tobacco. She reports current alcohol use. She reports that she does not use drugs.  Current Outpatient Medications   Medication Instructions   • acetaminophen (TYLENOL) 650 mg, As needed   • albuterol (Ventolin HFA) 90 mcg/act inhaler 2 puffs, Inhalation, Every 6 hours PRN   • allopurinol (ZYLOPRIM) 50 mg, Oral, 2 times weekly, DO NOT START UNTIL GOUT FLARE IS RESOLVED   • amLODIPine (NORVASC) 10 mg, Oral, Daily   • aspirin (ECOTRIN LOW STRENGTH) 81 mg, Oral, Daily   • B-D ULTRAFINE III SHORT PEN 31G X 8 MM MISC    • calcium acetate (PHOSLO) 667 mg, Oral, Daily after dinner   • Calcium Citrate  "500 mg, Oral, Daily   • CoQ10 100 mg, Daily   • desloratadine (CLARINEX) 5 mg, Oral, Daily   • docusate sodium (COLACE) 100 mg, Daily PRN   • escitalopram (LEXAPRO) 10 mg, Oral, Daily   • gabapentin (NEURONTIN) 100 mg, As needed   • hydrALAZINE (APRESOLINE) 25 mg, Oral, 2 times daily, Take with 50 mg hydralazine daily   • hydrALAZINE (APRESOLINE) 50 mg, Oral, 2 times daily   • insulin aspart (NOVOLOG) 5 Units, 2 times daily with meals   • levothyroxine 125 mcg, Oral, Daily   • methenamine hippurate (HIPREX) 1 g, Oral, 2 times daily with meals   • pantoprazole (PROTONIX) 40 mg, Oral, 2 times daily   • pramipexole (MIRAPEX) 0.25 mg, Oral, Daily at bedtime   • Probiotic Product (PROBIOTIC BLEND PO) 2 times daily   • psyllium (METAMUCIL) 58.6 % packet 1 packet, As needed   • rosuvastatin (CRESTOR) 5 mg, Oral, Daily   • sucralfate (CARAFATE) 1 g, Oral, 3 times daily before meals   • torsemide (DEMADEX) 10 mg, Oral, Daily PRN, Take 10 mg. Daily except Monday, Wednesday, Friday 20 mg.   • Tresiba FlexTouch 10 Units, Daily at bedtime   • Vitamin B12 1,000 mcg, Oral, Weekly   • Vitamin D (Cholecalciferol) 2,000 Units, Daily   Allergies[4]   Medications Ordered Prior to Encounter[5]   Social History[6]     Objective  Ht 5' 3\" (1.6 m)   Wt 69.4 kg (153 lb)   BMI 27.10 kg/m²      Physical Exam  BP sitting on left: 152/70 with a heart rate of 68 and regular  BP standing left: 152/72 with a heart rate of 72 and regular  Physical Exam: General:  No acute distress/weak appearing  Skin:  No acute rash  Eyes:  No scleral icterus and noninjected  ENT:  Moist mucous membranes  Neck:  Supple, no jugular venous distention, trachea midline, overall appearance is normal  Chest:  Clear to auscultation  CVS:  Regular rate and rhythm, without a rub or gallops  Abdomen:  Normal bowel sounds, soft and nontender and nondistended  Extremities:  No edema, and no cyanosis, no significant arthritic changes  Neuro:  No gross focality/negative " "asterixis  Psych:  Alert and oriented and appropriate             [1]   Past Medical History:  Diagnosis Date   • Actinic keratosis    • Acute blood loss anemia 09/24/2024   • Acute cystitis without hematuria 04/28/2023   • Acute cystitis without hematuria 04/28/2023   • Acute respiratory failure with hypoxia (HCC) 02/15/2024   • Acute-on-chronic kidney injury  (HCC)    • NIKOS (acute kidney injury) (HCC) 05/08/2020   • Allergic    • Allergic rhinitis    • Ambulatory dysfunction 10/22/2020   • AMS (altered mental status) 07/14/2020   • Anesthesia     pt reports \"had to use double lumen endo tube for hiatal hernia repair/so surgeon could get to where he needed to work\"   • Arthritis    • Aspiration into airway    • At high risk for falls 07/17/2024   • Michael esophagus 1993    Lot of stress with children   • Basal cell carcinoma 2007    left cheek    • BCC (basal cell carcinoma) 05/27/2021    Left Nasal tip   • Breast discharge 06/19/2023   • Breast pain 06/19/2023   • Cancer (HCC)     squamous cell cancer on forhead   • Cancer (HCC)     basal cell on nose    • Cholelithiasis    • Chronic kidney disease 2000, 2018    Stones, kidney disease stage 4   • Chronic pain disorder     bilat feet and joint pain on occas   • Colon polyp    • Confusion 10/30/2023   • Constipation    • COPD (chronic obstructive pulmonary disease) (HCC)    • COVID-19 08/2021    recovered at home/did receive monoclonal infusion   • COVID-19 virus infection 08/16/2021   • Dental bridge present    • Depression    • Diabetes mellitus (HCC)     Type 2   • Diabetic neuropathy (HCC)    • Difficulty walking 2017   • Disease of thyroid gland    • Dyspnea    • Elevated liver enzymes 04/04/2023   • Epigastric abdominal pain with severe diabetic gastroparesis, status post EGD/Botox injection, 5/14 05/17/2021   • Facial abrasion, initial encounter 03/22/2023   • Fall 03/22/2023   • Family history of thyroid problem    • Fatty liver    • Fibromyalgia, primary  "   • Fracture of orbital floor, blow-out, right, closed, with routine healing, subsequent encounter 03/22/2023   • Fracture of orbital floor, right side, initial encounter for closed fracture (HCC) 03/22/2023   • GERD (gastroesophageal reflux disease)    • Gout    • Heart burn    • Hiatal hernia    • History of cerebrovascular accident (CVA) due to ischemia 10/31/2023    10/23: MRI with foci of late acute infarct involving the right putamen      • History of cholecystectomy 10/27/2023   • History of colon polyps 07/30/2021   • History of kidney stones 09/29/2020    Hx of recurrent kidney stones   • History of kidney stones 09/29/2020    Hx of recurrent kidney stones  Formatting of this note might be different from the original. Hx of recurrent kidney stones  Last Assessment & Plan:  Formatting of this note might be different from the original. We will set her up for follow-up in 3 months with a KUB prior.  She knows to call if she has any kidney stone type pain in the meantime.   • History of Nissen fundoplication 10/27/2023   • History of pneumonia    • History of recurrent UTIs 10/27/2023   • History of repair of hiatal hernia 05/03/2020   • Hyperlipidemia    • Hyperplasia, parathyroid (HCC)    • Hypertension    • Hypertensive urgency 10/27/2023   • Hyponatremia 04/26/2023   • Hypotension 04/13/2022   • 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 01/06/2023   • Kidney problem    • Kidney stone    • Left hip pain 10/05/2023   • Leukocytosis 07/14/2020   • Memory loss Julu2 2020   • Motion sickness    • Motion sickness    • Multiple closed fractures of ribs of right side 03/22/2023   • Nasal bones, closed fracture 03/22/2023   • Neck pain    • Obesity 1978   • Obesity (BMI 30.0-34.9)    • Olecranon bursitis of right elbow 07/29/2024   • Other chest pain 09/13/2021   • Other fatigue 09/21/2023   • Palpitations 07/18/2023   • Peripheral neuropathy    • Pollen allergies    • Postural dizziness with presyncope  08/16/2023   • Preoperative clearance 06/27/2019   • Reactive depression 07/17/2024   • RLS (restless legs syndrome)    • S/P foot surgery 07/20/2022   • SCC (squamous cell carcinoma) 05/04/2021    left mid forehead   • SCC (squamous cell carcinoma) 2023    chest   • Seasonal allergies    • Shingles 01 05 23   • Sleep apnea     has inspire   • Squamous cell skin cancer 2007    left cheek    • Stroke (HCC)    • Swollen ankles    • 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, left     great toe fusion   • Transaminitis 06/03/2024   • Urinary incontinence    • Urinary tract infection 03/28/2022   • Wears glasses    [2]   Past Surgical History:  Procedure Laterality Date   • ABDOMINAL SURGERY  1997,2015,1972    Nissen x2 1972 tubal ligarion   • ARTHROSCOPY KNEE Right    • BREAST BIOPSY      stereotactic-benign   • BREAST BIOPSY      stereo-benign   • BREAST CYST EXCISION Bilateral     benign- done in [place]-neg   • BREAST EXCISIONAL BIOPSY      unknown date-benign   • BREAST EXCISIONAL BIOPSY      unknown date-benign   • BREAST EXCISIONAL BIOPSY      unknown date-benign   • BREAST EXCISIONAL BIOPSY      unknown age-benign   • CARDIAC CATHETERIZATION     • CATARACT EXTRACTION Right    • CHOLECYSTECTOMY     • COLONOSCOPY     • EXAMINATION UNDER ANESTHESIA N/A 06/24/2021    Procedure: EXAM UNDER ANESTHESIA (EUA), DISE;  Surgeon: Gregory Maier MD;  [facility] MAIN OR;  Service: ENT   • HERNIA REPAIR      hiatal   • HIATAL HERNIA REPAIR     • KNEE SURGERY Right     Torn maniscus lap surg   • LIPOSUCTION     • LYMPH NODE BIOPSY     • MOHS SURGERY  05/20/2021    left mid forehead-Mickey   • MOHS SURGERY Left 05/27/2021    Left nasal tip- mickey    • RI LIGATION/BIOPSY TEMPORAL ARTERY Left 01/05/2023    Procedure: BIOPSY ARTERY TEMPORAL;  Surgeon: Alec Dennis DO;  Location: [facility] Main OR;  Service: Vascular   • RI OPEN IMPLANTATION CRANIAL NERVE BOOM & PULSE GEN N/A 11/10/2021    Procedure: INSERTION UPPER AIRWAY STIMULATOR,  INSPIRE IMPLANT;  Surgeon: Gregory Maier MD;  Location: [facility];  Service: ENT   • NM UNLISTED PROCEDURE FOOT/TOES Right 07/19/2022    Procedure: 1st metatarsal phalangeal joint fusion;  Surgeon: Dede Bonner DPM;  Location: [facility];  Service: Podiatry   • REDUCTION MAMMAPLASTY Bilateral 2000   • REDUCTION MAMMAPLASTY     • SKIN BIOPSY     • SKIN CANCER EXCISION  2007    squamous cell carcinoma    • SKIN CANCER EXCISION  2007    basal cell carcinoma   • SKIN CANCER EXCISION  2023    SCCIS chest   • SQUAMOUS CELL CARCINOMA EXCISION     • TOE SURGERY Right 08/04/2022    Right Great [employer]   • TONSILLECTOMY     • TUBAL LIGATION     • UPPER GASTROINTESTINAL ENDOSCOPY     • US GUIDED BREAST BIOPSY RIGHT COMPLETE Right 07/18/2022   [3]   Family History  Problem Relation Name Age of Onset   • Heart disease Mother Michelle Hopper    • Depression Mother Michelle Hopper    • Hypertension Mother Michelle Hopper    • COPD Mother Michelle Hopper    • Hearing loss Mother Michelle Hopper    • Anxiety disorder Mother Michelle Hopper    • Heart disease Father Jesus Hopper    • Lung cancer Father Jesus Hopper 70        Smoker    • Cancer Father Jesus Hopper         brain   • Alcohol abuse Father Jesus Hopper    • Dementia Father Jesus Hopper    • Hypertension Father Jesus Hopper    • Thyroid disease Father Jesus Hopper    • COPD Father Jesus Hopper    • Arthritis Father Jesus Hopper    • Brain cancer Father Jesus Hopper 74   • Brain cancer Sister Kiesha    • Hypertension Sister Kiesha    • Diabetes Sister Kiesha    • Heart disease Sister Kiesha    • Thyroid disease Sister Kiesha    • Cancer Sister Kiesha         Lympoma, lung   • Lung cancer Sister Kiesha 79   • Anxiety disorder Sister Kiesha    • Migraines Sister Kiesha    • Hypertension Brother Mendoza Hopper    • Diabetes Brother Mendoza Hopper    • Cancer Brother Mendoza Hopper         Throat   • Dementia Brother Mendoza Hopper    • Stroke Brother Jesus, michael, dhaval    • Hypertension Brother Jesus, michael,  dhaval    • Heart disease Brother [place]    • Diabetes Brother [place]    • Hypertension Brother [place]    • Allergies Brother dhaval    • No Known Problems Son     • No Known Problems Son     • Brain cancer Paternal Aunt          unknown age   • Breast cancer Neg Hx     [4]   Allergies  Allergen Reactions   • Ciprofloxacin Hives   • Pollen Extract Allergic Rhinitis   • Sulfamethoxazole-Trimethoprim Tongue Swelling   [5]   Current Outpatient Medications on File Prior to Visit   Medication Sig Dispense Refill   • acetaminophen (TYLENOL) 500 mg tablet Take 650 mg by mouth as needed     • albuterol (Ventolin HFA) 90 mcg/act inhaler Inhale 2 puffs every 6 (six) hours as needed for wheezing 54 g 0   • allopurinol (ZYLOPRIM) 100 mg tablet Take 0.5 tablets (50 mg total) by mouth 2 (two) times a week DO NOT START UNTIL GOUT FLARE IS RESOLVED 12 tablet 0   • amLODIPine (NORVASC) 10 mg tablet Take 1 tablet (10 mg total) by mouth daily (Patient taking differently: Take 10 mg by mouth daily at bedtime) 90 tablet 0   • aspirin (ECOTRIN LOW STRENGTH) 81 mg EC tablet Take 1 tablet (81 mg total) by mouth daily Do not start before October 31, 2023.  0   • B-D ULTRAFINE III SHORT PEN 31G X 8 MM MISC   3   • calcium acetate (PHOSLO) capsule Take 1 capsule (667 mg total) by mouth daily after dinner 90 capsule 3   • Calcium Citrate 250 MG TABS Take 2 tablets (500 mg total) by mouth in the morning (Patient taking differently: Take 500 mg by mouth daily at bedtime)     • Coenzyme Q10 (CoQ10) 100 MG CAPS Take 100 mg by mouth in the morning Bed time     • Cyanocobalamin (Vitamin B12) 1000 MCG TBCR Take 1 tablet (1,000 mcg total) by mouth once a week 12 tablet 0   • desloratadine (CLARINEX) 5 MG tablet TAKE 1 TABLET (5 MG TOTAL) BY MOUTH DAILY. 90 tablet 1   • docusate sodium (COLACE) 100 mg capsule Take 100 mg by mouth daily as needed for constipation     • escitalopram (LEXAPRO) 10 mg tablet Take 1 tablet (10 mg total) by mouth daily 90  tablet 3   • gabapentin (NEURONTIN) 100 mg capsule Take 100 mg by mouth if needed     • insulin aspart (NovoLOG) 100 units/mL injection Inject 5 Units under the skin 2 (two) times a day with meals 5 units with lunch and dinner     • insulin degludec (Tresiba FlexTouch) 100 units/mL injection pen Inject 10 Units under the skin daily at bedtime     • levothyroxine 125 mcg tablet Take 1 tablet (125 mcg total) by mouth daily 90 tablet 0   • methenamine hippurate (HIPREX) 1 g tablet Take 1 tablet (1 g total) by mouth 2 (two) times a day with meals 180 tablet 1   • pantoprazole (PROTONIX) 40 mg tablet Take 1 tablet (40 mg total) by mouth 2 (two) times a day 60 tablet 5   • pramipexole (MIRAPEX) 0.25 mg tablet Take 1 tablet (0.25 mg total) by mouth daily at bedtime 90 tablet 1   • Probiotic Product (PROBIOTIC BLEND PO) Take by mouth in the morning and before bedtime.     • psyllium (METAMUCIL) 58.6 % packet Take 1 packet by mouth if needed     • rosuvastatin (CRESTOR) 5 mg tablet Take 1 tablet (5 mg total) by mouth daily 90 tablet 0   • sucralfate (CARAFATE) 1 g tablet Take 1 tablet (1 g total) by mouth 3 (three) times a day before meals (Patient taking differently: Take 1 g by mouth 2 (two) times a day) 270 tablet 2   • torsemide (DEMADEX) 10 mg tablet Take 1 tablet (10 mg total) by mouth daily as needed (lower extremity swelling) Take 10 mg. Daily except Monday, Wednesday, Friday 20 mg. (Patient taking differently: Take 10 mg by mouth daily as needed (lower extremity swelling) Taking only PRN) 150 tablet 1   • Vitamin D, Cholecalciferol, 25 MCG (1000 UT) TABS Take 2,000 Units by mouth in the morning (Patient taking differently: Take 1,200 Units by mouth in the morning)     • [DISCONTINUED] hydrALAZINE (APRESOLINE) 25 mg tablet Take 2 tablets (50 mg total) by mouth in the morning and 2 tablets (50 mg total) before bedtime. 180 tablet 3   • hydrALAZINE (APRESOLINE) 50 mg tablet Take 50 mg by mouth in the morning and 50 mg  before bedtime.       No current facility-administered medications on file prior to visit.   [6]   Social History  Tobacco Use   • Smoking status: Former     Current packs/day: 0.00     Average packs/day: 2.0 packs/day for 10.0 years (20.0 ttl pk-yrs)     Types: Cigarettes     Start date: 1966     Quit date: 1976     Years since quittin.4     Passive exposure: Past   • Smokeless tobacco: Never   • Tobacco comments:     Smoked 2 pack a day   Vaping Use   • Vaping status: Never Used   Substance and Sexual Activity   • Alcohol use: Yes     Comment: 1 or 2 a year   • Drug use: No   • Sexual activity: Not Currently     Partners: Male     Birth control/protection: Abstinence, Post-menopausal, Female Sterilization     Comment: defer

## 2025-06-12 NOTE — PATIENT INSTRUCTIONS
Visit summary:  - Kidney function is definitely slowly progressing.  As we discussed if you start feeling worsening symptoms which I will delineate below please contact me right away.  In the interim I did contact Dr. Aguilar to try to speed up your palliative care appointment.  As we discussed, at the moment we will continue with medical therapy but if it ever gets to a point where you are suffering and not feeling well because of progressive kidney disease we can look into hospice at that time.        1. Medication changes today:  Please increase vitamin D 2000 units daily  Please increase hydralazine to a total of 75 mg twice a day you can take a 50 mg pill with the 25 mg pill twice a day for your blood pressure  I would continue with allopurinol as per Dr. Cardio    2.  General instructions:  Continue to avoid salt  Continue to hydrate    3.  Please go for  fasting  lab work 1-2 weeks after making the above medication change.    4.  Please go for a kidney ultrasound as soon as possible we will try to facilitate to get that done        5.  Please take 1 week a blood pressure readings 2 to 3 weeks after the above medication change    AS FOLLOWS  MORNING AND EVENING, SITTING AND STANDING as follows:  TAKE THE MORNING READINGS BEFORE ANY MEDICATIONS AND WHEN YOU ARE RELAXED FOR SEVERAL MINUTES  TAKE THE EVENING READINGS:  BETWEEN 7-10 P.M.; PRIOR TO ANY MEDICATIONS; AT LEAST IN OUR  FROM DINNER; AND CERTAINLY AFTER RELAXING FOR A FEW MINUTES  PLEASE INCLUDE HEART RATE WITH YOUR BLOOD PRESSURE READINGS  When taking standing readings, keep your arm supported at heart level and not dangling  Make sure you are sitting with your back supported and feet on the ground and do not cross your legs or feet  Make sure you have not taken any coffee or caffeine products or exercised or smoke cigarettes at least 30 minutes before taking your blood pressure  Then please mail these readings into the office            6.   follow-up in 3 months  Please bring in 1 week a blood pressure readings morning evening, sitting and standing is outlined above  PLEASE BRING AN YOUR BLOOD PRESSURE MACHINE TO CORRELATE WITH THE OFFICE MACHINE AT THIS NEXT SCHEDULED VISIT  Please go for fasting lab work 1-2 weeks prior to your appointment      7.  General non medical recommendations:  AVOID SALT BUT NOT ADDING AN READING LABELS TO MAKE SURE THERE IS LOW-SALT IN THE FOOD THAT YOU ARE EATING  Goal is less than 2 g of sodium intake or less than 5 g of sodium chloride intake per day    Avoid nonsteroidal anti-inflammatory drugs such as Naprosyn, ibuprofen, Aleve, Advil, Celebrex, Meloxicam (Mobic) etc.  You can use Tylenol as needed if you do not have any liver condition to be concerned about    Avoid medications such as Sudafed or decongestants and antihistamines that contained the D component which is the decongestant.  You can take antihistamines without the decongestant or D component.    Try to avoid medications such as pantoprazole or  Protonix/Nexium or Esomeprazole)/Prilosec or omeprazole/Prevacid or lansoprazole/AcipHex or Rabeprazole.  If you are able to, use Pepcid as this is safer for your kidneys.    Please do not drink more than 2 glasses of alcohol/wine on a daily basis as this may contribute to your high blood pressure.

## 2025-06-13 ENCOUNTER — RESULTS FOLLOW-UP (OUTPATIENT)
Dept: NEPHROLOGY | Facility: CLINIC | Age: 75
End: 2025-06-13

## 2025-06-13 RX ORDER — ALLOPURINOL 100 MG/1
50 TABLET ORAL 2 TIMES WEEKLY
Qty: 12 TABLET | Refills: 0 | Status: SHIPPED | OUTPATIENT
Start: 2025-06-16 | End: 2025-09-14

## 2025-06-13 NOTE — TELEPHONE ENCOUNTER
----- Message from Donaldo Baires MD sent at 6/13/2025  9:03 AM EDT -----  Please let patient know ultrasound was normal no blockages  ----- Message -----  From: Interface, Radiology Results In  Sent: 6/12/2025   4:47 PM EDT  To: Donaldo Baires MD

## 2025-06-18 ENCOUNTER — TELEPHONE (OUTPATIENT)
Dept: INTERNAL MEDICINE CLINIC | Facility: CLINIC | Age: 75
End: 2025-06-18

## 2025-06-18 ENCOUNTER — APPOINTMENT (EMERGENCY)
Dept: RADIOLOGY | Facility: HOSPITAL | Age: 75
End: 2025-06-18
Payer: MEDICARE

## 2025-06-18 ENCOUNTER — TELEPHONE (OUTPATIENT)
Age: 75
End: 2025-06-18

## 2025-06-18 ENCOUNTER — NURSE TRIAGE (OUTPATIENT)
Age: 75
End: 2025-06-18

## 2025-06-18 ENCOUNTER — HOSPITAL ENCOUNTER (EMERGENCY)
Facility: HOSPITAL | Age: 75
Discharge: HOME | End: 2025-06-18
Attending: EMERGENCY MEDICINE
Payer: MEDICARE

## 2025-06-18 VITALS
SYSTOLIC BLOOD PRESSURE: 148 MMHG | DIASTOLIC BLOOD PRESSURE: 78 MMHG | TEMPERATURE: 97.6 F | HEART RATE: 74 BPM | OXYGEN SATURATION: 95 % | RESPIRATION RATE: 18 BRPM

## 2025-06-18 DIAGNOSIS — R79.89 ELEVATED TSH: ICD-10-CM

## 2025-06-18 DIAGNOSIS — R00.2 HEART PALPITATIONS: Primary | ICD-10-CM

## 2025-06-18 DIAGNOSIS — N18.6 ESRD (END STAGE RENAL DISEASE) (HCC): ICD-10-CM

## 2025-06-18 LAB
2HR DELTA HS TROPONIN: 1 NG/L
ALBUMIN SERPL BCG-MCNC: 4 G/DL (ref 3.5–5)
ALP SERPL-CCNC: 68 U/L (ref 34–104)
ALT SERPL W P-5'-P-CCNC: 22 U/L (ref 7–52)
ANION GAP SERPL CALCULATED.3IONS-SCNC: 7 MMOL/L (ref 4–13)
AST SERPL W P-5'-P-CCNC: 29 U/L (ref 13–39)
BASOPHILS # BLD AUTO: 0.01 THOUSANDS/ÂΜL (ref 0–0.1)
BASOPHILS NFR BLD AUTO: 0 % (ref 0–1)
BILIRUB SERPL-MCNC: 0.43 MG/DL (ref 0.2–1)
BUN SERPL-MCNC: 58 MG/DL (ref 5–25)
CALCIUM SERPL-MCNC: 9 MG/DL (ref 8.4–10.2)
CARDIAC TROPONIN I PNL SERPL HS: 10 NG/L (ref ?–50)
CARDIAC TROPONIN I PNL SERPL HS: 11 NG/L (ref ?–50)
CHLORIDE SERPL-SCNC: 105 MMOL/L (ref 96–108)
CO2 SERPL-SCNC: 20 MMOL/L (ref 21–32)
CREAT SERPL-MCNC: 4.74 MG/DL (ref 0.6–1.3)
EOSINOPHIL # BLD AUTO: 0.39 THOUSAND/ÂΜL (ref 0–0.61)
EOSINOPHIL NFR BLD AUTO: 4 % (ref 0–6)
ERYTHROCYTE [DISTWIDTH] IN BLOOD BY AUTOMATED COUNT: 14.2 % (ref 11.6–15.1)
ERYTHROCYTE [SEDIMENTATION RATE] IN BLOOD: 19 MM/HOUR (ref 0–29)
GFR SERPL CREATININE-BSD FRML MDRD: 8 ML/MIN/1.73SQ M
GLUCOSE SERPL-MCNC: 131 MG/DL (ref 65–140)
HCT VFR BLD AUTO: 33.9 % (ref 34.8–46.1)
HGB BLD-MCNC: 11.5 G/DL (ref 11.5–15.4)
IMM GRANULOCYTES # BLD AUTO: 0.04 THOUSAND/UL (ref 0–0.2)
IMM GRANULOCYTES NFR BLD AUTO: 0 % (ref 0–2)
LYMPHOCYTES # BLD AUTO: 2.04 THOUSANDS/ÂΜL (ref 0.6–4.47)
LYMPHOCYTES NFR BLD AUTO: 21 % (ref 14–44)
MAGNESIUM SERPL-MCNC: 2.5 MG/DL (ref 1.9–2.7)
MCH RBC QN AUTO: 30.7 PG (ref 26.8–34.3)
MCHC RBC AUTO-ENTMCNC: 33.9 G/DL (ref 31.4–37.4)
MCV RBC AUTO: 91 FL (ref 82–98)
MONOCYTES # BLD AUTO: 0.46 THOUSAND/ÂΜL (ref 0.17–1.22)
MONOCYTES NFR BLD AUTO: 5 % (ref 4–12)
NEUTROPHILS # BLD AUTO: 6.92 THOUSANDS/ÂΜL (ref 1.85–7.62)
NEUTS SEG NFR BLD AUTO: 70 % (ref 43–75)
NRBC BLD AUTO-RTO: 0 /100 WBCS
PLATELET # BLD AUTO: 220 THOUSANDS/UL (ref 149–390)
PMV BLD AUTO: 11.2 FL (ref 8.9–12.7)
POTASSIUM SERPL-SCNC: 5 MMOL/L (ref 3.5–5.3)
PROT SERPL-MCNC: 6.7 G/DL (ref 6.4–8.4)
RBC # BLD AUTO: 3.74 MILLION/UL (ref 3.81–5.12)
SODIUM SERPL-SCNC: 132 MMOL/L (ref 135–147)
TSH SERPL DL<=0.05 MIU/L-ACNC: 6.66 UIU/ML (ref 0.45–4.5)
WBC # BLD AUTO: 9.86 THOUSAND/UL (ref 4.31–10.16)

## 2025-06-18 PROCEDURE — 93308 TTE F-UP OR LMTD: CPT | Performed by: EMERGENCY MEDICINE

## 2025-06-18 PROCEDURE — 93005 ELECTROCARDIOGRAM TRACING: CPT

## 2025-06-18 PROCEDURE — 85025 COMPLETE CBC W/AUTO DIFF WBC: CPT | Performed by: EMERGENCY MEDICINE

## 2025-06-18 PROCEDURE — 85652 RBC SED RATE AUTOMATED: CPT | Performed by: EMERGENCY MEDICINE

## 2025-06-18 PROCEDURE — 99285 EMERGENCY DEPT VISIT HI MDM: CPT

## 2025-06-18 PROCEDURE — 84484 ASSAY OF TROPONIN QUANT: CPT | Performed by: EMERGENCY MEDICINE

## 2025-06-18 PROCEDURE — 83735 ASSAY OF MAGNESIUM: CPT | Performed by: EMERGENCY MEDICINE

## 2025-06-18 PROCEDURE — 71045 X-RAY EXAM CHEST 1 VIEW: CPT

## 2025-06-18 PROCEDURE — 96374 THER/PROPH/DIAG INJ IV PUSH: CPT

## 2025-06-18 PROCEDURE — 84443 ASSAY THYROID STIM HORMONE: CPT | Performed by: EMERGENCY MEDICINE

## 2025-06-18 PROCEDURE — 99285 EMERGENCY DEPT VISIT HI MDM: CPT | Performed by: EMERGENCY MEDICINE

## 2025-06-18 PROCEDURE — 36415 COLL VENOUS BLD VENIPUNCTURE: CPT | Performed by: EMERGENCY MEDICINE

## 2025-06-18 PROCEDURE — 80053 COMPREHEN METABOLIC PANEL: CPT | Performed by: EMERGENCY MEDICINE

## 2025-06-18 RX ORDER — LABETALOL HYDROCHLORIDE 5 MG/ML
10 INJECTION, SOLUTION INTRAVENOUS ONCE
Status: DISCONTINUED | OUTPATIENT
Start: 2025-06-18 | End: 2025-06-18

## 2025-06-18 RX ORDER — LABETALOL HYDROCHLORIDE 5 MG/ML
5 INJECTION, SOLUTION INTRAVENOUS ONCE
Status: COMPLETED | OUTPATIENT
Start: 2025-06-18 | End: 2025-06-18

## 2025-06-18 RX ADMIN — LABETALOL HYDROCHLORIDE 5 MG: 5 INJECTION, SOLUTION INTRAVENOUS at 14:11

## 2025-06-18 NOTE — ED ATTENDING ATTESTATION
6/18/2025  IAmaury MD, saw and evaluated the patient. I have discussed the patient with the resident/non-physician practitioner and agree with the resident's/non-physician practitioner's findings, Plan of Care, and MDM as documented in the resident's/non-physician practitioner's note, except where noted. All available labs and Radiology studies were reviewed.  I was present for key portions of any procedure(s) performed by the resident/non-physician practitioner and I was immediately available to provide assistance.       At this point I agree with the current assessment done in the Emergency Department.  I have conducted an independent evaluation of this patient a history and physical is as follows:      This is a 75-year-old female with a relevant past medical history of CKD stage V, hypertension, hyperlipidemia, diabetes, COPD, not on any oxygen at home presenting to the ED today for a complaint of palpitations.  Patient recently had her blood pressure medication changed from metoprolol to hydralazine, due to some fatigue that she had been experiencing.  Patient states that over the past 10 days since she stopped her metoprolol she has been having palpitations.  Her palpitations have been worsening.  She has not had any chest tightness, pain pressure or discomfort, nausea vomiting abdominal pain, diarrhea, constipation but has had some generalized weakness.  She otherwise denies any significantly associated symptoms.  On exam she is slightly hypertensive, but otherwise her exam is unremarkable.  Her differential diagnosis includes: Atypical ACS versus medication reaction versus withdrawal versus other.  Patient had CBC, metabolic panel, troponin x 2, EKG, chest x-ray all of which were unremarkable, similar to her baseline.  She has a sodium of 132, and a creatinine of 4.74.  She has a TSH of 6.6, and I would expect it to be elevated in the acute illness phase.  Patient will follow-up with her PCP  "as an outpatient, cardiologist, and will speak with them about restarting her metoprolol.  The management plan was discussed in detail with the patient at bedside and all questions were answered. Strict ED return instructions were discussed at bedside. Prior to discharge, both verbal and written instructions were provided. We discussed the signs and symptoms that should prompt the patient to return to the ED. All questions were answered and the patient was comfortable with the plan of care and discharged home. The patient agrees to return to the Emergency Department for concerns and/or progression of illness.    Portions of the above record have been created with voice recognition software.  Occasional wrong word or \"sound alike\" substitutions may have occurred due to the inherent limitations of voice recognition software.  Read the chart carefully and recognize, using context, where substitutions may have occurred.      ED Course  ED Course as of 06/18/25 1417   Wed Jun 18, 2025   1307 Sodium(!): 132   1307 Creatinine(!): 4.74   1315 hs TnI 0hr: 10         Critical Care Time  POC Cardiac US    Date/Time: 6/18/2025 2:06 PM    Performed by: Amaury Griffiths MD  Authorized by: Amaury Griffiths MD    Patient location:  ED  Procedure details:     Exam Type:  Transthoracic Echocardiography (TTE), limited    Purpose of Exam: diagnostic    Indications comment:  Palpitations    Assessment / Evaluation for: cardiac function      Exam Type: initial exam      Image quality: diagnostic      Image availability:  Images available in PACS and video obtained  Patient Details:     Cardiac Rhythm:  Regular  Cardiac findings:     Echo modes evaluated: limited 2D      Views obtained: parasternal long axis, parasternal short axis, subcostal and apical 4-chamber      Pericardial effusion: absent      Tamponade physiology: absent      Wall motion: normal      LV systolic function: normal      RV dilation: none    Interpretation:     "  NO acute Cardiac abnl.  EF approaching NL.

## 2025-06-18 NOTE — DISCHARGE INSTRUCTIONS
Today Trina Davis was seen in the emergency department for palpitations. Emergency department workup included labs, chest x-ray. I believe the symptoms to be the result of medication side effect (since you have been off your metoprolol, your new resting heart rate has increased causing symptoms of palpitations). At this time there does not appear to be an emergent life threatening cause to explain these symptoms. Trina is stable for discharge with outpatient follow up.     Please follow up with your primary care provider this week to discuss incidental findings, medication changes if needed and further management. If a specialist referral was placed for you please call them tomorrow to be seen at the next available appointment. Please review all results discussed today with your primary care provider and/or specialist.    Please return to the emergency department as soon as possible if you develop uncontrollable fevers (Temp >100.4F), uncontrollable pain, vomiting, chest pain, trouble breathing, weakness on one side of your body, slurred speech, vision changes or any other concerning symptoms.

## 2025-06-18 NOTE — ED PROVIDER NOTES
"  ED Disposition       None          Assessment & Plan       Medical Decision Making  Patient with history as below presented to triage with CC: \"Patient presents with:  Palpitations: Pt with palpitations x few days, (denies CP), SOB w/ exertion, pulsing sensation in ears. Increase to hydralazine dose last week. Hx ESRD  \"  Hx obtained from pt and     75-year-old female presenting with complaint of palpitations, and dizziness/lightheadedness ongoing for the past 3 days.  Recently had BP medications adjusted with increasing hydralazine and discontinuance of metoprolol.  Feels like she can feel her heartbeat in her temples along with in her chest.  Otherwise hemodynamically stable.  Sent in by PCP for further evaluation.  No focal deficits on exam.  Lungs clear to auscultation.  EKG normal sinus without ischemic changes, arrhythmia.  Suspect medication side effect as patients resting heart rate now increased since he has been off metoprolol component of anxiety.    Plan: Labs, EKG, chest x-ray, ESR given temporal tenderness to eval for GCA/temporal arteritis, BP control, reassessment, likely discharge outpatient follow-up    DDx including but not limited to: metabolic abnormality, cardiac arrhythmia, ACS, MI,  thyroid disease, PE, anxiety, adverse reaction.     I have independently ordered, reviewed and interpreted the following: labs and/or imaging and/or EKG studies listed below  Reviewed external records including notes, and prior labs/imaging results.    Disposition: Patient stable for outpatient management. Discussed need for follow up with their primary doctor or specialist to review all results, including incidental findings as below. Patient discharged with explanation of ED workup and diagnosis, instructions on how to obtain outpatient follow up, care instructions at home, and strict return precautions if patient develops new or worsening symptoms. Patients questions answered and agreeable with " discharge plan.     See ED Course for further MDM.      PLEASE NOTE:  This encounter was completed utilizing the [employer] Direct Speech Voice Recognition Software. Grammatical errors, random word insertions, pronoun errors and incomplete sentences are occasional inherent consequences of the system due to software limitations, ambient noise and hardware issues.These may be missed by proof reading prior to affixing electronic signature. Any questions or concerns about the content, text or information contained within the body of this dictation should be directly addressed to the physician for clarification. Please do not hesitate to call me directly if you have any questions or concerns.     See ED Course for further MDM.      PLEASE NOTE:  This encounter was completed utilizing the [employer] Direct Speech Voice Recognition Software. Grammatical errors, random word insertions, pronoun errors and incomplete sentences are occasional inherent consequences of the system due to software limitations, ambient noise and hardware issues.These may be missed by proof reading prior to affixing electronic signature. Any questions or concerns about the content, text or information contained within the body of this dictation should be directly addressed to the physician for clarification. Please do not hesitate to call me directly if you have any questions or concerns.      Amount and/or Complexity of Data Reviewed  Independent Historian: spouse  External Data Reviewed: labs, radiology, ECG and notes.  Labs: ordered. Decision-making details documented in ED Course.  Radiology: ordered and independent interpretation performed. Decision-making details documented in ED Course.  ECG/medicine tests: ordered and independent interpretation performed. Decision-making details documented in ED Course.    Risk  Prescription drug management.      ED Course as of 06/18/25 1505   Wed Jun 18, 2025   1216 Reviewed initial triage  "phone discussion from earlier today.    \"Dizziness off and on for a week or more, worse this morning. Feels \"unbalanced\", also complains of feeling like heart is pounding and can feel heart beat in right temple. Voice feels shaky for 3 days as well\"   1246 CBC and differential(!)  No leukocytosis.  No anemia.  Platelets WNL.   1316 Comprehensive metabolic panel(!)  Hyponatremia 132.  Potassium 5 however moderately hemolyzed.  No anion gap.  ESRD history with creatinine at baseline.  Glucose WNL.  LFTs/liver enzyme WNL.   1317 Sed Rate: 19  WNL.   1317 hs TnI 0hr: 10  Will get delta troponin.   1345 TSH 3RD GENERATON(!): 6.661  Free T4 pending.  Advised outpatient follow-up   1451 Delta 2hr hsTnI: 1       Medications - No data to display    ED Risk Strat Scores                    No data recorded        SBIRT 20yo+      Flowsheet Row Most Recent Value   Initial Alcohol Screen: US AUDIT-C     1. How often do you have a drink containing alcohol? 0 Filed at: 06/18/2025 1220   2. How many drinks containing alcohol do you have on a typical day you are drinking?  0 Filed at: 06/18/2025 1220   3a. Male UNDER 65: How often do you have five or more drinks on one occasion? 0 Filed at: 06/18/2025 1220   3b. FEMALE Any Age, or MALE 65+: How often do you have 4 or more drinks on one occassion? 0 Filed at: 06/18/2025 1220   Audit-C Score 0 Filed at: 06/18/2025 1220   VALE: How many times in the past year have you...    Used an illegal drug or used a prescription medication for non-medical reasons? Never Filed at: 06/18/2025 1220                            History of Present Illness       Chief Complaint   Patient presents with   • Palpitations     Pt with palpitations x few days, (denies CP), SOB w/ exertion, pulsing sensation in ears. Increase to hydralazine dose last week. Hx ESRD       Past Medical History[1]   Past Surgical History[2]   Family History[3]   Social History[4]   E-Cigarette/Vaping   • E-Cigarette Use Never User  "      E-Cigarette/Vaping Substances   • Nicotine No    • THC No    • CBD No    • Flavoring No    • Other No    • Unknown No       I have reviewed and agree with the history as documented.     75-year-old female with history of ESRD, pursuing palliative as she does not want to do HD, HTN presenting to the ED with complaints of dizziness/lightheadedness, palpitations and exertional shortness of breath ongoing for the past 3 days.  Patient states that she would not have came to the hospital but was told by her PCP as she reported the symptoms to the nurse triage line.  She states that she recently had her medications adjusted with increase in hydralazine and recent DC of metoprolol.  States her her BP has improved however her heart rate was normally in the 50s to 60s, and now in the 70s to 80s.  She reports feeling palpitations with palpable temporal artery pulse and racing heartbeat.  This makes her anxious causing her to feel dizzy/lightheaded.  Otherwise ambulating at baseline.  History of ESRD is due to start palliative care next month as she does not want to pursue HD.  Does endorse mild temporal headache.  Otherwise denies recent falls, head injury, chest pain, focal deficits, facial asymmetry, slurred speech.  Does complain of baseline shortness of breath with exertion, no increase from prior.      Review of Systems   Constitutional:  Negative for chills and fever.   HENT:  Negative for congestion and sore throat.    Eyes:  Negative for photophobia, pain, redness and visual disturbance.   Respiratory:  Positive for shortness of breath. Negative for cough and chest tightness.    Cardiovascular:  Positive for palpitations. Negative for chest pain.   Gastrointestinal:  Negative for abdominal pain, constipation, diarrhea, nausea and vomiting.   Genitourinary:  Negative for difficulty urinating, dysuria, flank pain, frequency, hematuria, pelvic pain, urgency, vaginal bleeding, vaginal discharge and vaginal pain.    Musculoskeletal:  Negative for arthralgias, back pain, neck pain and neck stiffness.   Skin:  Negative for color change and rash.   Neurological:  Positive for dizziness, light-headedness and headaches. Negative for tremors, seizures, syncope, facial asymmetry, speech difficulty, weakness and numbness.   All other systems reviewed and are negative.          Objective       ED Triage Vitals [06/18/25 1216]   Temperature Pulse Blood Pressure Respirations SpO2 Patient Position - Orthostatic VS   97.6 °F (36.4 °C) 86 (!) 196/86 18 98 % --      Temp Source Heart Rate Source BP Location FiO2 (%) Pain Score    Oral Monitor Right arm -- --      Vitals      Date and Time Temp Pulse SpO2 Resp BP Pain Score FACES Pain Rating User   06/18/25 1216 97.6 °F (36.4 °C) 86 98 % 18 196/86 -- -- KL            Physical Exam  Vitals and nursing note reviewed.   Constitutional:       General: She is not in acute distress.     Appearance: She is well-developed. She is ill-appearing (Chronically ill-appearing.). She is not toxic-appearing.   HENT:      Head: Normocephalic and atraumatic.      Comments: Mild bilateral temporal tenderness.     Right Ear: External ear normal.      Left Ear: External ear normal.      Nose: Nose normal. No congestion.      Mouth/Throat:      Mouth: Mucous membranes are moist.      Pharynx: Oropharynx is clear. No oropharyngeal exudate or posterior oropharyngeal erythema.     Eyes:      Extraocular Movements: Extraocular movements intact.      Conjunctiva/sclera: Conjunctivae normal.      Pupils: Pupils are equal, round, and reactive to light.       Cardiovascular:      Rate and Rhythm: Normal rate and regular rhythm.      Pulses: Normal pulses.      Heart sounds: Normal heart sounds. No murmur heard.  Pulmonary:      Effort: Pulmonary effort is normal. No respiratory distress.      Breath sounds: Normal breath sounds. No stridor. No wheezing, rhonchi or rales.   Abdominal:      General: Bowel sounds are  normal.      Palpations: Abdomen is soft.      Tenderness: There is no abdominal tenderness. There is no right CVA tenderness, left CVA tenderness, guarding or rebound.     Musculoskeletal:         General: No swelling, tenderness or deformity.      Cervical back: Normal range of motion and neck supple. No rigidity or tenderness.      Right lower leg: No edema.      Left lower leg: No edema.   Lymphadenopathy:      Cervical: No cervical adenopathy.     Skin:     General: Skin is warm and dry.      Capillary Refill: Capillary refill takes less than 2 seconds.      Coloration: Skin is not jaundiced or pale.      Findings: No bruising, erythema, lesion or rash.     Neurological:      General: No focal deficit present.      Mental Status: She is alert and oriented to person, place, and time. Mental status is at baseline.      GCS: GCS eye subscore is 4. GCS verbal subscore is 5. GCS motor subscore is 6.      Cranial Nerves: Cranial nerves 2-12 are intact. No cranial nerve deficit, dysarthria or facial asymmetry.      Sensory: Sensation is intact. No sensory deficit.      Motor: Motor function is intact. No weakness.      Coordination: Coordination is intact. Coordination normal.      Gait: Gait is intact.     Psychiatric:         Mood and Affect: Mood normal.         Behavior: Behavior normal.         Thought Content: Thought content normal.       Results Reviewed       Procedure Component Value Units Date/Time    CBC and differential [[id number]] Collected: 06/18/25 1225    Lab Status: No result Specimen: Blood from Arm, Left     HS Troponin 0hr (reflex protocol) [[id number]] Collected: 06/18/25 1225    Lab Status: No result Specimen: Blood from Arm, Left     Comprehensive metabolic panel [[id number]] Collected: 06/18/25 1225    Lab Status: No result Specimen: Blood from Arm, Left             XR chest 1 view portable    (Results Pending)       ECG 12 Lead Documentation Only    Date/Time: 6/18/2025 12:26 PM    Performed  by: Jay Berry DO  Authorized by: Jay Berry DO    Indications / Diagnosis:  Palpitations  ECG reviewed by me, the ED Provider: yes    Patient location:  ED  Previous ECG:     Previous ECG:  Compared to current    Comparison ECG info:  March 21, 2025.    Similarity:  Changes noted  Interpretation:     Interpretation: normal    Rate:     ECG rate:  79.    ECG rate assessment: normal    Rhythm:     Rhythm: sinus rhythm    Ectopy:     Ectopy: none    QRS:     QRS axis:  Normal    QRS intervals:  Normal  Conduction:     Conduction: normal    ST segments:     ST segments:  Normal  T waves:     T waves: flattening        ED Medication and Procedure Management   Prior to Admission Medications   Prescriptions Last Dose Informant Patient Reported? Taking?   B-D ULTRAFINE III SHORT PEN 31G X 8 MM MISC  Self, Spouse/Significant Other Yes No   Calcium Citrate 250 MG TABS  Self No No   Sig: Take 2 tablets (500 mg total) by mouth in the morning   Patient taking differently: Take 500 mg by mouth daily at bedtime   Coenzyme Q10 (CoQ10) 100 MG CAPS  Self, Spouse/Significant Other Yes No   Sig: Take 100 mg by mouth in the morning Bed time   Cyanocobalamin (Vitamin B12) 1000 MCG TBCR   No No   Sig: Take 1 tablet (1,000 mcg total) by mouth once a week   Probiotic Product (PROBIOTIC BLEND PO)  Self Yes No   Sig: Take by mouth in the morning and before bedtime.   Vitamin D, Cholecalciferol, 25 MCG (1000 UT) TABS  Self Yes No   Sig: Take 2,000 Units by mouth in the morning   Patient taking differently: Take 1,200 Units by mouth in the morning   acetaminophen (TYLENOL) 500 mg tablet  Self Yes No   Sig: Take 650 mg by mouth as needed   albuterol (Ventolin HFA) 90 mcg/act inhaler  Self, Spouse/Significant Other No No   Sig: Inhale 2 puffs every 6 (six) hours as needed for wheezing   allopurinol (ZYLOPRIM) 100 mg tablet   No No   Sig: Take 0.5 tablets (50 mg total) by mouth 2 (two) times a week DO NOT START UNTIL GOUT FLARE IS RESOLVED    amLODIPine (NORVASC) 10 mg tablet   No No   Sig: Take 1 tablet (10 mg total) by mouth daily   Patient taking differently: Take 10 mg by mouth daily at bedtime   aspirin (ECOTRIN LOW STRENGTH) 81 mg EC tablet  Self, Spouse/Significant Other No No   Sig: Take 1 tablet (81 mg total) by mouth daily Do not start before October 31, 2023.   calcium acetate (PHOSLO) capsule   No No   Sig: Take 1 capsule (667 mg total) by mouth daily after dinner   desloratadine (CLARINEX) 5 MG tablet   No No   Sig: TAKE 1 TABLET (5 MG TOTAL) BY MOUTH DAILY.   docusate sodium (COLACE) 100 mg capsule   Yes No   Sig: Take 100 mg by mouth daily as needed for constipation   escitalopram (LEXAPRO) 10 mg tablet   No No   Sig: Take 1 tablet (10 mg total) by mouth daily   gabapentin (NEURONTIN) 100 mg capsule   Yes No   Sig: Take 100 mg by mouth if needed   hydrALAZINE (APRESOLINE) 25 mg tablet   No No   Sig: Take 1 tablet (25 mg total) by mouth in the morning and 1 tablet (25 mg total) before bedtime. Take with 50 mg hydralazine daily.   hydrALAZINE (APRESOLINE) 50 mg tablet   Yes No   Sig: Take 50 mg by mouth in the morning and 50 mg before bedtime.   insulin aspart (NovoLOG) 100 units/mL injection  Self, Spouse/Significant Other Yes No   Sig: Inject 5 Units under the skin 2 (two) times a day with meals 5 units with lunch and dinner   insulin degludec (Tresiba FlexTouch) 100 units/mL injection pen   Yes No   Sig: Inject 10 Units under the skin daily at bedtime   levothyroxine 125 mcg tablet   No No   Sig: Take 1 tablet (125 mcg total) by mouth daily   methenamine hippurate (HIPREX) 1 g tablet   No No   Sig: Take 1 tablet (1 g total) by mouth 2 (two) times a day with meals   pantoprazole (PROTONIX) 40 mg tablet   No No   Sig: Take 1 tablet (40 mg total) by mouth 2 (two) times a day   pramipexole (MIRAPEX) 0.25 mg tablet   No No   Sig: Take 1 tablet (0.25 mg total) by mouth daily at bedtime   psyllium (METAMUCIL) 58.6 % packet   Yes No   Sig: Take  "1 packet by mouth if needed   rosuvastatin (CRESTOR) 5 mg tablet   No No   Sig: Take 1 tablet (5 mg total) by mouth daily   sucralfate (CARAFATE) 1 g tablet   No No   Sig: Take 1 tablet (1 g total) by mouth 3 (three) times a day before meals   Patient taking differently: Take 1 g by mouth 2 (two) times a day   torsemide (DEMADEX) 10 mg tablet   No No   Sig: Take 1 tablet (10 mg total) by mouth daily as needed (lower extremity swelling) Take 10 mg. Daily except Monday, Wednesday, Friday 20 mg.   Patient taking differently: Take 10 mg by mouth daily as needed (lower extremity swelling) Taking only PRN      Facility-Administered Medications: None     Patient's Medications   Discharge Prescriptions    No medications on file     No discharge procedures on file.  ED SEPSIS DOCUMENTATION                  [1]  Past Medical History:  Diagnosis Date   • Actinic keratosis    • Acute blood loss anemia 09/24/2024   • Acute cystitis without hematuria 04/28/2023   • Acute cystitis without hematuria 04/28/2023   • Acute respiratory failure with hypoxia (HCC) 02/15/2024   • Acute-on-chronic kidney injury  (HCC)    • NIKOS (acute kidney injury) (HCC) 05/08/2020   • Allergic    • Allergic rhinitis    • Ambulatory dysfunction 10/22/2020   • AMS (altered mental status) 07/14/2020   • Anesthesia     pt reports \"had to use double lumen endo tube for hiatal hernia repair/so surgeon could get to where he needed to work\"   • Arthritis    • Aspiration into airway    • At high risk for falls 07/17/2024   • Michael esophagus 1993    Lot of stress with children   • Basal cell carcinoma 2007    left cheek    • BCC (basal cell carcinoma) 05/27/2021    Left Nasal tip   • Breast discharge 06/19/2023   • Breast pain 06/19/2023   • Cancer (HCC)     squamous cell cancer on forhead   • Cancer (HCC)     basal cell on nose    • Cholelithiasis    • Chronic kidney disease 2000, 2018    Stones, kidney disease stage 4   • Chronic pain disorder     bilat feet " and joint pain on occas   • Colon polyp    • Confusion 10/30/2023   • Constipation    • COPD (chronic obstructive pulmonary disease) (HCC)    • COVID-19 08/2021    recovered at home/did receive monoclonal infusion   • COVID-19 virus infection 08/16/2021   • Dental bridge present    • Depression    • Diabetes mellitus (HCC)     Type 2   • Diabetic neuropathy (HCC)    • Difficulty walking 2017   • Disease of thyroid gland    • Dyspnea    • Elevated liver enzymes 04/04/2023   • Epigastric abdominal pain with severe diabetic gastroparesis, status post EGD/Botox injection, 5/14 05/17/2021   • Facial abrasion, initial encounter 03/22/2023   • Fall 03/22/2023   • Family history of thyroid problem    • Fatty liver    • Fibromyalgia, primary    • Fracture of orbital floor, blow-out, right, closed, with routine healing, subsequent encounter 03/22/2023   • Fracture of orbital floor, right side, initial encounter for closed fracture (HCC) 03/22/2023   • GERD (gastroesophageal reflux disease)    • Gout    • Heart burn    • Hiatal hernia    • History of cerebrovascular accident (CVA) due to ischemia 10/31/2023    10/23: MRI with foci of late acute infarct involving the right putamen      • History of cholecystectomy 10/27/2023   • History of colon polyps 07/30/2021   • History of kidney stones 09/29/2020    Hx of recurrent kidney stones   • History of kidney stones 09/29/2020    Hx of recurrent kidney stones  Formatting of this note might be different from the original. Hx of recurrent kidney stones  Last Assessment & Plan:  Formatting of this note might be different from the original. We will set her up for follow-up in 3 months with a KUB prior.  She knows to call if she has any kidney stone type pain in the meantime.   • History of Nissen fundoplication 10/27/2023   • History of pneumonia    • History of recurrent UTIs 10/27/2023   • History of repair of hiatal hernia 05/03/2020   • Hyperlipidemia    • Hyperplasia, parathyroid  ([facility])    • Hypertension    • Hypertensive urgency 10/27/2023   • Hyponatremia 04/26/2023   • Hypotension 04/13/2022   • 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 01/06/2023   • Kidney problem    • Kidney stone    • Left hip pain 10/05/2023   • Leukocytosis 07/14/2020   • Memory loss Julu2 2020   • Motion sickness    • Motion sickness    • Multiple closed fractures of ribs of right side 03/22/2023   • Nasal bones, closed fracture 03/22/2023   • Neck pain    • Obesity 1978   • Obesity (BMI 30.0-34.9)    • Olecranon bursitis of right elbow 07/29/2024   • Other chest pain 09/13/2021   • Other fatigue 09/21/2023   • Palpitations 07/18/2023   • Peripheral neuropathy    • Pollen allergies    • Postural dizziness with presyncope 08/16/2023   • Preoperative clearance 06/27/2019   • Reactive depression 07/17/2024   • RLS (restless legs syndrome)    • S/P foot surgery 07/20/2022   • SCC (squamous cell carcinoma) 05/04/2021    left mid forehead   • SCC (squamous cell carcinoma) 2023    chest   • Seasonal allergies    • Shingles 01 05 23   • Sleep apnea     has inspire   • Squamous cell skin cancer 2007    left cheek    • Stroke ([facility])    • Swollen ankles    • 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, left     great toe fusion   • Transaminitis 06/03/2024   • Urinary incontinence    • Urinary tract infection 03/28/2022   • Wears glasses    [2]  Past Surgical History:  Procedure Laterality Date   • ABDOMINAL SURGERY  1997,2015,1972    Nissen x2 1972 tubal ligarion   • ARTHROSCOPY KNEE Right    • BREAST BIOPSY      stereotactic-benign   • BREAST BIOPSY      stereo-benign   • BREAST CYST EXCISION Bilateral     benign- done in [place]-neg   • BREAST EXCISIONAL BIOPSY      unknown date-benign   • BREAST EXCISIONAL BIOPSY      unknown date-benign   • BREAST EXCISIONAL BIOPSY      unknown date-benign   • BREAST EXCISIONAL BIOPSY      unknown age-benign   • CARDIAC CATHETERIZATION     • CATARACT EXTRACTION Right    • CHOLECYSTECTOMY      • COLONOSCOPY     • EXAMINATION UNDER ANESTHESIA N/A 06/24/2021    Procedure: EXAM UNDER ANESTHESIA (EUA), DISE;  Surgeon: Gregory Maier MD;  [facility];  Service: ENT   • HERNIA REPAIR      hiatal   • HIATAL HERNIA REPAIR     • KNEE SURGERY Right     Torn maniscus lap surg   • LIPOSUCTION     • LYMPH NODE BIOPSY     • MOHS SURGERY  05/20/2021    left mid forehead-Mickey   • MOHS SURGERY Left 05/27/2021    Left nasal tip- mickey    • MN LIGATION/BIOPSY TEMPORAL ARTERY Left 01/05/2023    Procedure: BIOPSY ARTERY TEMPORAL;  Surgeon: Alec Dennis DO;  [facility];  Service: Vascular   • MN OPEN IMPLANTATION CRANIAL NERVE BOOM & PULSE GEN N/A 11/10/2021    Procedure: INSERTION UPPER AIRWAY STIMULATOR, INSPIRE IMPLANT;  Surgeon: Gregory Maier MD;  [facility];  Service: ENT   • MN UNLISTED PROCEDURE FOOT/TOES Right 07/19/2022    Procedure: 1st metatarsal phalangeal joint fusion;  Surgeon: Dede Bonner DPM;  [facility];  Service: Podiatry   • REDUCTION MAMMAPLASTY Bilateral 2000   • REDUCTION MAMMAPLASTY     • SKIN BIOPSY     • SKIN CANCER EXCISION  2007    squamous cell carcinoma    • SKIN CANCER EXCISION  2007    basal cell carcinoma   • SKIN CANCER EXCISION  2023    SCCIS chest   • SQUAMOUS CELL CARCINOMA EXCISION     • TOE SURGERY Right 08/04/2022    Right Great Toe Fusion   • TONSILLECTOMY     • TUBAL LIGATION     • UPPER GASTROINTESTINAL ENDOSCOPY     • US GUIDED BREAST BIOPSY RIGHT COMPLETE Right 07/18/2022   [3]  Family History  Problem Relation Name Age of Onset   • Heart disease Mother Michelle Hopper    • Depression Mother Michelle Hopper    • Hypertension Mother Michelle Hopper    • COPD Mother Michelle Hopper    • Hearing loss Mother Michelle Hopper    • Anxiety disorder Mother Michelle Hopper    • Heart disease Father Jesus Lizper    • Lung cancer Father Jesus Lizper 70        Smoker    • Cancer Father Jesus Lizper         brain   • Alcohol abuse Father Jesus Lizper    •  Dementia Father Jesus Hall    • Hypertension Father Jesus Hall    • Thyroid disease Father Jesus Hall    • COPD Father Jesus Hall    • Arthritis Father Jesus Hall    • Brain cancer Father Jesus Hall 74   • Brain cancer Sister Kiesha    • Hypertension Sister Kiesha    • Diabetes Sister Kiesha    • Heart disease Sister Kiesha    • Thyroid disease Sister Kiesha    • Cancer Sister Kiesha         Lympoma, lung   • Lung cancer Sister Kiesha 79   • Anxiety disorder Sister Kiesha    • Migraines Sister Kiesha    • Hypertension Brother Mendoza Lizper    • Diabetes Brother Mendoza Lizper    • Cancer Brother Mendoza Hopper         Throat   • Dementia Brother Mendoza Hopper    • Stroke Brother Jesus, michael, dhaval    • Hypertension Brother Jesus, michael, dhaval    • Heart disease Brother [place]ton    • Diabetes Brother Michael    • Hypertension Brother [place]    • Allergies Brother dhaval    • No Known Problems Son     • No Known Problems Son     • Brain cancer Paternal Aunt          unknown age   • Breast cancer Neg Hx     [4]  Social History  Tobacco Use   • Smoking status: Former     Current packs/day: 0.00     Average packs/day: 2.0 packs/day for 10.0 years (20.0 ttl pk-yrs)     Types: Cigarettes     Start date: 1966     Quit date: 1976     Years since quittin.4     Passive exposure: Past   • Smokeless tobacco: Never   • Tobacco comments:     Smoked 2 pack a day   Vaping Use   • Vaping status: Never Used   Substance Use Topics   • Alcohol use: Yes     Comment: 1 or 2 a year   • Drug use: No      Jay Berry DO  [id number]

## 2025-06-18 NOTE — TELEPHONE ENCOUNTER
"REASON FOR CONVERSATION: Dizziness    SYMPTOMS: Dizziness off and on for a week or more, worse this morning. Feels \"unbalanced\", also complains of feeling like heart is pounding and can feel heart beat in right temple. Voice feels shaky for 3 days as well    OTHER HEALTH INFORMATION: /84, heart rate 80, blood sugar 140 on phone. Patient awaiting placement for palliative care 7/18. Stage 5 chronic kidney disease. Admits hydralazine was increased 3 days ago from 50 mg twice daily to 75 mg twice daily    PROTOCOL DISPOSITION: Go to ED/UCC Now (Or to Office with PCP Approval)    CARE ADVICE PROVIDED: Discussed patients case with Sandie in clinical and agreed patient should be seen in ED, patient has someone that can drive her    PRACTICE FOLLOW-UP: none needed at this time        Reason for Disposition   Patient sounds very sick or weak to the triager    Answer Assessment - Initial Assessment Questions  1. DESCRIPTION: \"Describe your dizziness.\"      \"Unbalanced\"   2. LIGHTHEADED: \"Do you feel lightheaded?\" (e.g., somewhat faint, woozy, weak upon standing)      Yes- more unbalanced. Denies feeling like going to faint  3. VERTIGO: \"Do you feel like either you or the room is spinning or tilting?\" (i.e., vertigo)      denies  4. SEVERITY: \"How bad is it?\"  \"Do you feel like you are going to faint?\" \"Can you stand and walk?\"      Mildly- moderately. Able to walk but does not want to stand too long.   5. ONSET:  \"When did the dizziness begin?\"      Started week or so ago off and on, but worse this morning  6. AGGRAVATING FACTORS: \"Does anything make it worse?\" (e.g., standing, change in head position)      Changing head position  7. HEART RATE: \"Can you tell me your heart rate?\" \"How many beats in 15 seconds?\"  (Note: Not all patients can do this.)        80, but feels like heart is beating loudly  8. CAUSE: \"What do you think is causing the dizziness?\" (e.g., decreased fluids or food, diarrhea, emotional distress, heat " "exposure, new medicine, sudden standing, vomiting; unknown)      Unsure. Did increase hydralazine 3 days ago from 50 mg twice daily to 75 mg twice daily  9. RECURRENT SYMPTOM: \"Have you had dizziness before?\" If Yes, ask: \"When was the last time?\" \"What happened that time?\"      Probably, does not know when  10. OTHER SYMPTOMS: \"Do you have any other symptoms?\" (e.g., fever, chest pain, vomiting, diarrhea, bleeding)        Feels that voice is shaky in the last few days as well. Blood pressure 146/84. Pulse feels like pounding in temple area for about 3 days. Blood glucose 140 at time of call  11. PREGNANCY: \"Is there any chance you are pregnant?\" \"When was your last menstrual period?\"            Protocols used: Dizziness-Adult-[place]    "

## 2025-06-18 NOTE — TELEPHONE ENCOUNTER
Patient called stating she was experiencing light headedness, feels her heart beat is at her temples and speech is changing, I informed the [facility] that she needed to get evaluated by the ED given her history. I informed Dr. aRe and she agrees.

## 2025-06-19 ENCOUNTER — OFFICE VISIT (OUTPATIENT)
Dept: INTERNAL MEDICINE CLINIC | Facility: CLINIC | Age: 75
End: 2025-06-19
Payer: MEDICARE

## 2025-06-19 VITALS
BODY MASS INDEX: 27.29 KG/M2 | HEART RATE: 79 BPM | OXYGEN SATURATION: 97 % | DIASTOLIC BLOOD PRESSURE: 76 MMHG | SYSTOLIC BLOOD PRESSURE: 160 MMHG | WEIGHT: 154 LBS | HEIGHT: 63 IN | TEMPERATURE: 98 F

## 2025-06-19 DIAGNOSIS — I10 PRIMARY HYPERTENSION: Primary | ICD-10-CM

## 2025-06-19 DIAGNOSIS — R00.0 REFLEX TACHYCARDIA: ICD-10-CM

## 2025-06-19 DIAGNOSIS — N18.5 STAGE 5 CHRONIC KIDNEY DISEASE NOT ON CHRONIC DIALYSIS (HCC): ICD-10-CM

## 2025-06-19 PROCEDURE — 99214 OFFICE O/P EST MOD 30 MIN: CPT

## 2025-06-19 PROCEDURE — G2211 COMPLEX E/M VISIT ADD ON: HCPCS

## 2025-06-19 RX ORDER — METOPROLOL TARTRATE 25 MG/1
12.5 TABLET, FILM COATED ORAL EVERY 12 HOURS
Qty: 30 TABLET | Refills: 2 | Status: ON HOLD | OUTPATIENT
Start: 2025-06-19

## 2025-06-19 NOTE — PROGRESS NOTES
Name: Trina Davis      : 1950      MRN: [id number]  Encounter Provider: Cheikh Parks MD  Encounter Date: 2025   Encounter department: [facility] INTERNAL MEDICINE [place]  :  Assessment & Plan  Reflex tachycardia  Suspect symptoms related to reflex tachycardia due to recent increase of hydralazine in conjunction to already been on Norvasc  Discuss adding again lopressor alhought at lower dose in order to help with symptoms and control effects. This was previosly downtitrated then discontinue at 25mg BID dose due to bradycardia  Orders:    metoprolol tartrate (LOPRESSOR) 25 mg tablet; Take 0.5 tablets (12.5 mg total) by mouth every 12 (twelve) hours    Primary hypertension  Blood Pressure: 160/76  Not at goal  Recently uptitrated with hydralazine 75mg QD  Given suspicious symptomatic reflex tachycardia and recent uptitration of hydralazine will DC amlodipine and start lopressor as trial to control symptoms  Discuss ongoing monitoring of BP and HR at home  RTC in 4-8 weeks  Orders:    metoprolol tartrate (LOPRESSOR) 25 mg tablet; Take 0.5 tablets (12.5 mg total) by mouth every 12 (twelve) hours    Stage 5 chronic kidney disease not on chronic dialysis (HCC)  Lab Results   Component Value Date    EGFR 8 2025    EGFR 8 06/10/2025    EGFR 7 2025    CREATININE 4.74 (H) 2025    CREATININE 4.78 (H) 06/10/2025    CREATININE 5.23 (H) 2025       Orders:    metoprolol tartrate (LOPRESSOR) 25 mg tablet; Take 0.5 tablets (12.5 mg total) by mouth every 12 (twelve) hours  Progession of CKD multifactorial  Dialysis has been deferred by patient which is reasonable as may not provide quality of life   Mild uremic sx   Awaiting Palliative care evaluation on  which I agree         History of Present Illness   Mrs. Davis 75 year old patient with CKD stage V not on dialysis, HTN, RLS, COPD, anemia of chronic disease who presents for follow up after seen at the emergency department due to  "ongoing palpitations, dizziness, lightheadedness. On evaluation vital signs hypertensive with /88, HR 80s, ECG NSR no ischemic changes. Blood work including TSH negative other than known CKD stage V. She was given a dose labetalol and discharge for follow up with office. Since last office visit her blood pressure did remained above goal and had increase of her hydralazine by Nephrology, she was also take off lopressor due to bradycardia and have room for bp titration. She is currently awaiting palliative care  evaluation as patient has deferred dialysis as her focus is quality over quantity of life. Currenly she feels better compared to her visit to ED. Has ongoing feeling of \"punding of her heart\" which she can also perceive sensation bitemporally. No dizziness or lightheadedness. Has uremic symptoms including pruritus, fatigue,changes in taste, decrease appetite and upset stomach. No chest pain or changes in mentation.        Review of Systems   Constitutional:  Positive for fatigue. Negative for chills and fever.   HENT:  Negative for ear pain and sore throat.    Eyes:  Negative for pain and visual disturbance.   Respiratory:  Negative for cough and shortness of breath.    Cardiovascular:  Positive for palpitations. Negative for chest pain and leg swelling.   Gastrointestinal:  Negative for abdominal pain and vomiting.   Genitourinary:  Negative for dysuria and hematuria.   Musculoskeletal:  Negative for arthralgias and back pain.   Skin:  Negative for color change and rash.   Neurological:  Negative for seizures and syncope.   All other systems reviewed and are negative.      Objective   /76 (BP Location: Left arm, Patient Position: Sitting, Cuff Size: Large)   Pulse 79   Temp 98 °F (36.7 °C) (Tympanic)   Ht 5' 3\" (1.6 m)   Wt 69.9 kg (154 lb)   SpO2 97%   BMI 27.28 kg/m²      Physical Exam  Vitals and nursing note reviewed.   Constitutional:       General: She is not in acute distress.     " Appearance: She is well-developed.   HENT:      Head: Normocephalic and atraumatic.     Eyes:      Conjunctiva/sclera: Conjunctivae normal.       Cardiovascular:      Rate and Rhythm: Normal rate and regular rhythm.      Heart sounds: No murmur heard.  Pulmonary:      Effort: Pulmonary effort is normal. No respiratory distress.      Breath sounds: Normal breath sounds.   Abdominal:      Palpations: Abdomen is soft.      Tenderness: There is no abdominal tenderness.     Musculoskeletal:         General: No swelling.      Cervical back: Neck supple.     Skin:     General: Skin is warm and dry.      Capillary Refill: Capillary refill takes less than 2 seconds.     Neurological:      Mental Status: She is alert.     Psychiatric:         Mood and Affect: Mood normal.

## 2025-06-19 NOTE — ASSESSMENT & PLAN NOTE
Blood Pressure: 160/76  Not at goal  Recently uptitrated with hydralazine 75mg QD  Given suspicious symptomatic reflex tachycardia and recent uptitration of hydralazine will DC amlodipine and start lopressor as trial to control symptoms  Discuss ongoing monitoring of BP and HR at home  RTC in 4-8 weeks  Orders:    metoprolol tartrate (LOPRESSOR) 25 mg tablet; Take 0.5 tablets (12.5 mg total) by mouth every 12 (twelve) hours

## 2025-06-19 NOTE — ASSESSMENT & PLAN NOTE
Lab Results   Component Value Date    EGFR 8 06/18/2025    EGFR 8 06/10/2025    EGFR 7 06/05/2025    CREATININE 4.74 (H) 06/18/2025    CREATININE 4.78 (H) 06/10/2025    CREATININE 5.23 (H) 06/05/2025       Orders:    metoprolol tartrate (LOPRESSOR) 25 mg tablet; Take 0.5 tablets (12.5 mg total) by mouth every 12 (twelve) hours  Progession of CKD multifactorial  Dialysis has been deferred by patient which is reasonable as may not provide quality of life   Mild uremic sx   Awaiting Palliative care evaluation on 7/18 which I agree

## 2025-06-20 LAB
ATRIAL RATE: 79 BPM
P AXIS: 61 DEGREES
PR INTERVAL: 192 MS
QRS AXIS: -6 DEGREES
QRSD INTERVAL: 82 MS
QT INTERVAL: 400 MS
QTC INTERVAL: 458 MS
T WAVE AXIS: 51 DEGREES
VENTRICULAR RATE: 79 BPM

## 2025-06-20 PROCEDURE — 93010 ELECTROCARDIOGRAM REPORT: CPT | Performed by: INTERNAL MEDICINE

## 2025-06-26 ENCOUNTER — APPOINTMENT (OUTPATIENT)
Dept: LAB | Facility: AMBULARY SURGERY CENTER | Age: 75
DRG: 305 | End: 2025-06-26
Attending: INTERNAL MEDICINE
Payer: MEDICARE

## 2025-06-26 DIAGNOSIS — G25.81 RLS (RESTLESS LEGS SYNDROME): ICD-10-CM

## 2025-06-26 DIAGNOSIS — N18.5 ANEMIA IN STAGE 5 CHRONIC KIDNEY DISEASE, NOT ON CHRONIC DIALYSIS  (HCC): Chronic | ICD-10-CM

## 2025-06-26 DIAGNOSIS — I12.0 BENIGN HYPERTENSIVE KIDNEY DISEASE WITH CHRONIC KIDNEY DISEASE STAGE V OR END STAGE RENAL DISEASE (HCC): ICD-10-CM

## 2025-06-26 DIAGNOSIS — R80.1 PERSISTENT PROTEINURIA: ICD-10-CM

## 2025-06-26 DIAGNOSIS — N18.5 TYPE 2 DIABETES MELLITUS WITH STAGE 5 CHRONIC KIDNEY DISEASE NOT ON CHRONIC DIALYSIS, UNSPECIFIED WHETHER LONG TERM INSULIN USE (HCC): ICD-10-CM

## 2025-06-26 DIAGNOSIS — E11.22 TYPE 2 DIABETES MELLITUS WITH STAGE 5 CHRONIC KIDNEY DISEASE NOT ON CHRONIC DIALYSIS, UNSPECIFIED WHETHER LONG TERM INSULIN USE (HCC): ICD-10-CM

## 2025-06-26 DIAGNOSIS — R60.0 EDEMA OF EXTREMITY: ICD-10-CM

## 2025-06-26 DIAGNOSIS — N18.5 STAGE 5 CHRONIC KIDNEY DISEASE NOT ON CHRONIC DIALYSIS (HCC): ICD-10-CM

## 2025-06-26 DIAGNOSIS — N25.81 SECONDARY HYPERPARATHYROIDISM OF RENAL ORIGIN (HCC): Chronic | ICD-10-CM

## 2025-06-26 DIAGNOSIS — D63.1 ANEMIA IN STAGE 5 CHRONIC KIDNEY DISEASE, NOT ON CHRONIC DIALYSIS  (HCC): Chronic | ICD-10-CM

## 2025-06-26 DIAGNOSIS — E78.2 MIXED HYPERLIPIDEMIA: ICD-10-CM

## 2025-06-26 DIAGNOSIS — N39.0 RECURRENT UTI: ICD-10-CM

## 2025-06-26 DIAGNOSIS — I12.9 PARENCHYMAL RENAL HYPERTENSION, STAGE 1 THROUGH STAGE 4 OR UNSPECIFIED CHRONIC KIDNEY DISEASE: ICD-10-CM

## 2025-06-26 DIAGNOSIS — E55.9 VITAMIN D DEFICIENCY: Chronic | ICD-10-CM

## 2025-06-26 DIAGNOSIS — I10 ESSENTIAL HYPERTENSION: ICD-10-CM

## 2025-06-26 LAB
ANION GAP SERPL CALCULATED.3IONS-SCNC: 8 MMOL/L (ref 4–13)
BUN SERPL-MCNC: 64 MG/DL (ref 5–25)
CALCIUM SERPL-MCNC: 8.7 MG/DL (ref 8.4–10.2)
CHLORIDE SERPL-SCNC: 108 MMOL/L (ref 96–108)
CO2 SERPL-SCNC: 24 MMOL/L (ref 21–32)
CREAT SERPL-MCNC: 5.15 MG/DL (ref 0.6–1.3)
ERYTHROCYTE [DISTWIDTH] IN BLOOD BY AUTOMATED COUNT: 13.9 % (ref 11.6–15.1)
GFR SERPL CREATININE-BSD FRML MDRD: 7 ML/MIN/1.73SQ M
GLUCOSE P FAST SERPL-MCNC: 102 MG/DL (ref 65–99)
HCT VFR BLD AUTO: 30.2 % (ref 34.8–46.1)
HGB BLD-MCNC: 9.7 G/DL (ref 11.5–15.4)
MCH RBC QN AUTO: 30.4 PG (ref 26.8–34.3)
MCHC RBC AUTO-ENTMCNC: 32.1 G/DL (ref 31.4–37.4)
MCV RBC AUTO: 95 FL (ref 82–98)
PLATELET # BLD AUTO: 221 THOUSANDS/UL (ref 149–390)
PMV BLD AUTO: 11.8 FL (ref 8.9–12.7)
POTASSIUM SERPL-SCNC: 3.6 MMOL/L (ref 3.5–5.3)
RBC # BLD AUTO: 3.19 MILLION/UL (ref 3.81–5.12)
SODIUM SERPL-SCNC: 140 MMOL/L (ref 135–147)
WBC # BLD AUTO: 7.83 THOUSAND/UL (ref 4.31–10.16)

## 2025-06-26 PROCEDURE — 80048 BASIC METABOLIC PNL TOTAL CA: CPT

## 2025-06-26 PROCEDURE — 85027 COMPLETE CBC AUTOMATED: CPT

## 2025-06-26 PROCEDURE — 36415 COLL VENOUS BLD VENIPUNCTURE: CPT

## 2025-06-26 RX ORDER — METHENAMINE HIPPURATE 1000 MG/1
1 TABLET ORAL 2 TIMES DAILY WITH MEALS
Qty: 180 TABLET | Refills: 1 | Status: ON HOLD | OUTPATIENT
Start: 2025-06-26 | End: 2025-06-29

## 2025-06-27 RX ORDER — PRAMIPEXOLE DIHYDROCHLORIDE 0.25 MG/1
0.25 TABLET ORAL
Qty: 90 TABLET | Refills: 1 | Status: ON HOLD | OUTPATIENT
Start: 2025-06-27

## 2025-06-29 ENCOUNTER — HOSPITAL ENCOUNTER (INPATIENT)
Facility: HOSPITAL | Age: 75
LOS: 2 days | Discharge: HOME | DRG: 305 | End: 2025-07-01
Attending: EMERGENCY MEDICINE
Payer: MEDICARE

## 2025-06-29 ENCOUNTER — APPOINTMENT (EMERGENCY)
Dept: RADIOLOGY | Facility: HOSPITAL | Age: 75
DRG: 305 | End: 2025-06-29
Payer: MEDICARE

## 2025-06-29 DIAGNOSIS — I10 ESSENTIAL HYPERTENSION: ICD-10-CM

## 2025-06-29 DIAGNOSIS — N18.5 STAGE 5 CHRONIC KIDNEY DISEASE NOT ON CHRONIC DIALYSIS (HCC): ICD-10-CM

## 2025-06-29 DIAGNOSIS — N25.81 SECONDARY HYPERPARATHYROIDISM OF RENAL ORIGIN (HCC): Chronic | ICD-10-CM

## 2025-06-29 DIAGNOSIS — N28.9 FUNCTION KIDNEY DECREASED: ICD-10-CM

## 2025-06-29 DIAGNOSIS — I12.9 PARENCHYMAL RENAL HYPERTENSION, STAGE 1 THROUGH STAGE 4 OR UNSPECIFIED CHRONIC KIDNEY DISEASE: ICD-10-CM

## 2025-06-29 DIAGNOSIS — I15.0 RENOVASCULAR HYPERTENSION: ICD-10-CM

## 2025-06-29 DIAGNOSIS — D64.9 ANEMIA: ICD-10-CM

## 2025-06-29 DIAGNOSIS — I10 HIGH BLOOD PRESSURE: Primary | ICD-10-CM

## 2025-06-29 PROBLEM — Z79.4 TYPE 2 DIABETES MELLITUS WITH KIDNEY COMPLICATION, WITH LONG-TERM CURRENT USE OF INSULIN (HCC): Status: ACTIVE | Noted: 2024-10-15

## 2025-06-29 PROBLEM — E11.29 TYPE 2 DIABETES MELLITUS WITH KIDNEY COMPLICATION, WITH LONG-TERM CURRENT USE OF INSULIN (HCC): Status: ACTIVE | Noted: 2024-10-15

## 2025-06-29 PROBLEM — R10.9 LEFT LATERAL ABDOMINAL PAIN: Status: ACTIVE | Noted: 2025-06-29

## 2025-06-29 LAB
2HR DELTA HS TROPONIN: -2 NG/L
ALBUMIN SERPL BCG-MCNC: 3.9 G/DL (ref 3.5–5)
ALP SERPL-CCNC: 68 U/L (ref 34–104)
ALT SERPL W P-5'-P-CCNC: 51 U/L (ref 7–52)
ANION GAP SERPL CALCULATED.3IONS-SCNC: 10 MMOL/L (ref 4–13)
AST SERPL W P-5'-P-CCNC: 23 U/L (ref 13–39)
ATRIAL RATE: 65 BPM
BASOPHILS # BLD AUTO: 0.03 THOUSANDS/ÂΜL (ref 0–0.1)
BASOPHILS NFR BLD AUTO: 0 % (ref 0–1)
BILIRUB SERPL-MCNC: 0.31 MG/DL (ref 0.2–1)
BUN SERPL-MCNC: 60 MG/DL (ref 5–25)
CALCIUM SERPL-MCNC: 9.1 MG/DL (ref 8.4–10.2)
CARDIAC TROPONIN I PNL SERPL HS: 11 NG/L (ref ?–50)
CARDIAC TROPONIN I PNL SERPL HS: 13 NG/L (ref ?–50)
CHLORIDE SERPL-SCNC: 106 MMOL/L (ref 96–108)
CO2 SERPL-SCNC: 23 MMOL/L (ref 21–32)
CREAT SERPL-MCNC: 5.5 MG/DL (ref 0.6–1.3)
EOSINOPHIL # BLD AUTO: 0.32 THOUSAND/ÂΜL (ref 0–0.61)
EOSINOPHIL NFR BLD AUTO: 4 % (ref 0–6)
ERYTHROCYTE [DISTWIDTH] IN BLOOD BY AUTOMATED COUNT: 13.9 % (ref 11.6–15.1)
EST. AVERAGE GLUCOSE BLD GHB EST-MCNC: 120 MG/DL
GFR SERPL CREATININE-BSD FRML MDRD: 6 ML/MIN/1.73SQ M
GLUCOSE SERPL-MCNC: 139 MG/DL (ref 65–140)
GLUCOSE SERPL-MCNC: 217 MG/DL (ref 65–140)
HBA1C MFR BLD: 5.8 %
HCT VFR BLD AUTO: 30.5 % (ref 34.8–46.1)
HGB BLD-MCNC: 10.6 G/DL (ref 11.5–15.4)
IMM GRANULOCYTES # BLD AUTO: 0.03 THOUSAND/UL (ref 0–0.2)
IMM GRANULOCYTES NFR BLD AUTO: 0 % (ref 0–2)
LYMPHOCYTES # BLD AUTO: 1.79 THOUSANDS/ÂΜL (ref 0.6–4.47)
LYMPHOCYTES NFR BLD AUTO: 19 % (ref 14–44)
MCH RBC QN AUTO: 31.3 PG (ref 26.8–34.3)
MCHC RBC AUTO-ENTMCNC: 34.8 G/DL (ref 31.4–37.4)
MCV RBC AUTO: 90 FL (ref 82–98)
MONOCYTES # BLD AUTO: 0.48 THOUSAND/ÂΜL (ref 0.17–1.22)
MONOCYTES NFR BLD AUTO: 5 % (ref 4–12)
NEUTROPHILS # BLD AUTO: 6.6 THOUSANDS/ÂΜL (ref 1.85–7.62)
NEUTS SEG NFR BLD AUTO: 72 % (ref 43–75)
NRBC BLD AUTO-RTO: 0 /100 WBCS
P AXIS: 53 DEGREES
PLATELET # BLD AUTO: 245 THOUSANDS/UL (ref 149–390)
PMV BLD AUTO: 11.5 FL (ref 8.9–12.7)
POTASSIUM SERPL-SCNC: 3.9 MMOL/L (ref 3.5–5.3)
PR INTERVAL: 258 MS
PROT SERPL-MCNC: 6.4 G/DL (ref 6.4–8.4)
QRS AXIS: 2 DEGREES
QRSD INTERVAL: 92 MS
QT INTERVAL: 470 MS
QTC INTERVAL: 489 MS
RBC # BLD AUTO: 3.39 MILLION/UL (ref 3.81–5.12)
SODIUM SERPL-SCNC: 139 MMOL/L (ref 135–147)
T WAVE AXIS: -20 DEGREES
VENTRICULAR RATE: 65 BPM
WBC # BLD AUTO: 9.25 THOUSAND/UL (ref 4.31–10.16)

## 2025-06-29 PROCEDURE — 93010 ELECTROCARDIOGRAM REPORT: CPT | Performed by: STUDENT IN AN ORGANIZED HEALTH CARE EDUCATION/TRAINING PROGRAM

## 2025-06-29 PROCEDURE — 82948 REAGENT STRIP/BLOOD GLUCOSE: CPT

## 2025-06-29 PROCEDURE — 71046 X-RAY EXAM CHEST 2 VIEWS: CPT

## 2025-06-29 PROCEDURE — 99285 EMERGENCY DEPT VISIT HI MDM: CPT | Performed by: EMERGENCY MEDICINE

## 2025-06-29 PROCEDURE — 84484 ASSAY OF TROPONIN QUANT: CPT

## 2025-06-29 PROCEDURE — 99284 EMERGENCY DEPT VISIT MOD MDM: CPT

## 2025-06-29 PROCEDURE — 85025 COMPLETE CBC W/AUTO DIFF WBC: CPT

## 2025-06-29 PROCEDURE — 93005 ELECTROCARDIOGRAM TRACING: CPT

## 2025-06-29 PROCEDURE — 83036 HEMOGLOBIN GLYCOSYLATED A1C: CPT

## 2025-06-29 PROCEDURE — 36415 COLL VENOUS BLD VENIPUNCTURE: CPT

## 2025-06-29 PROCEDURE — 80053 COMPREHEN METABOLIC PANEL: CPT

## 2025-06-29 PROCEDURE — 99223 1ST HOSP IP/OBS HIGH 75: CPT

## 2025-06-29 RX ORDER — PRAVASTATIN SODIUM 40 MG
40 TABLET ORAL
Status: DISCONTINUED | OUTPATIENT
Start: 2025-06-29 | End: 2025-07-01 | Stop reason: HOSPADM

## 2025-06-29 RX ORDER — GABAPENTIN 100 MG/1
100 CAPSULE ORAL DAILY PRN
Status: DISCONTINUED | OUTPATIENT
Start: 2025-06-29 | End: 2025-07-01 | Stop reason: HOSPADM

## 2025-06-29 RX ORDER — ONDANSETRON 2 MG/ML
4 INJECTION INTRAMUSCULAR; INTRAVENOUS EVERY 6 HOURS PRN
Status: DISCONTINUED | OUTPATIENT
Start: 2025-06-29 | End: 2025-07-01 | Stop reason: HOSPADM

## 2025-06-29 RX ORDER — SUCRALFATE 1 G/1
1 TABLET ORAL
Status: DISCONTINUED | OUTPATIENT
Start: 2025-06-29 | End: 2025-07-01 | Stop reason: HOSPADM

## 2025-06-29 RX ORDER — LIDOCAINE 50 MG/G
1 PATCH TOPICAL ONCE
Status: COMPLETED | OUTPATIENT
Start: 2025-06-29 | End: 2025-06-30

## 2025-06-29 RX ORDER — INSULIN GLARGINE 100 [IU]/ML
10 INJECTION, SOLUTION SUBCUTANEOUS
Status: DISCONTINUED | OUTPATIENT
Start: 2025-06-29 | End: 2025-07-01 | Stop reason: HOSPADM

## 2025-06-29 RX ORDER — UBIDECARENONE 30 MG
100 CAPSULE ORAL DAILY
Status: DISCONTINUED | OUTPATIENT
Start: 2025-06-29 | End: 2025-07-01 | Stop reason: HOSPADM

## 2025-06-29 RX ORDER — HYDRALAZINE HYDROCHLORIDE 25 MG/1
50 TABLET, FILM COATED ORAL EVERY 8 HOURS SCHEDULED
Status: DISCONTINUED | OUTPATIENT
Start: 2025-06-29 | End: 2025-06-30

## 2025-06-29 RX ORDER — ALBUTEROL SULFATE 90 UG/1
2 INHALANT RESPIRATORY (INHALATION) EVERY 6 HOURS PRN
Status: DISCONTINUED | OUTPATIENT
Start: 2025-06-29 | End: 2025-07-01 | Stop reason: HOSPADM

## 2025-06-29 RX ORDER — LORATADINE 10 MG/1
10 TABLET ORAL
Status: DISCONTINUED | OUTPATIENT
Start: 2025-06-29 | End: 2025-07-01 | Stop reason: HOSPADM

## 2025-06-29 RX ORDER — ACETAMINOPHEN 325 MG/1
975 TABLET ORAL EVERY 6 HOURS PRN
Status: DISCONTINUED | OUTPATIENT
Start: 2025-06-29 | End: 2025-07-01 | Stop reason: HOSPADM

## 2025-06-29 RX ORDER — ASPIRIN 81 MG/1
81 TABLET ORAL DAILY
Status: DISCONTINUED | OUTPATIENT
Start: 2025-06-30 | End: 2025-07-01 | Stop reason: HOSPADM

## 2025-06-29 RX ORDER — GABAPENTIN 100 MG/1
100 CAPSULE ORAL DAILY PRN
Status: DISCONTINUED | OUTPATIENT
Start: 2025-06-29 | End: 2025-06-29

## 2025-06-29 RX ORDER — PANTOPRAZOLE SODIUM 40 MG/1
40 TABLET, DELAYED RELEASE ORAL 2 TIMES DAILY
Status: DISCONTINUED | OUTPATIENT
Start: 2025-06-29 | End: 2025-07-01 | Stop reason: HOSPADM

## 2025-06-29 RX ORDER — ALLOPURINOL 100 MG/1
50 TABLET ORAL 2 TIMES WEEKLY
Status: DISCONTINUED | OUTPATIENT
Start: 2025-06-30 | End: 2025-07-01 | Stop reason: HOSPADM

## 2025-06-29 RX ORDER — AMLODIPINE BESYLATE 5 MG/1
10 TABLET ORAL ONCE
Status: COMPLETED | OUTPATIENT
Start: 2025-06-29 | End: 2025-06-29

## 2025-06-29 RX ORDER — HEPARIN SODIUM 5000 [USP'U]/ML
5000 INJECTION, SOLUTION INTRAVENOUS; SUBCUTANEOUS EVERY 8 HOURS SCHEDULED
Status: DISCONTINUED | OUTPATIENT
Start: 2025-06-29 | End: 2025-07-01 | Stop reason: HOSPADM

## 2025-06-29 RX ORDER — PRAMIPEXOLE DIHYDROCHLORIDE 0.25 MG/1
0.25 TABLET ORAL
Status: DISCONTINUED | OUTPATIENT
Start: 2025-06-29 | End: 2025-07-01 | Stop reason: HOSPADM

## 2025-06-29 RX ORDER — DOCUSATE SODIUM 100 MG/1
100 CAPSULE, LIQUID FILLED ORAL DAILY PRN
Status: DISCONTINUED | OUTPATIENT
Start: 2025-06-29 | End: 2025-07-01 | Stop reason: HOSPADM

## 2025-06-29 RX ORDER — CALCIUM ACETATE 667 MG/1
667 CAPSULE ORAL
Status: DISCONTINUED | OUTPATIENT
Start: 2025-06-29 | End: 2025-07-01 | Stop reason: HOSPADM

## 2025-06-29 RX ORDER — INSULIN LISPRO 100 [IU]/ML
1-5 INJECTION, SOLUTION INTRAVENOUS; SUBCUTANEOUS
Status: DISCONTINUED | OUTPATIENT
Start: 2025-06-29 | End: 2025-07-01 | Stop reason: HOSPADM

## 2025-06-29 RX ORDER — LEVOTHYROXINE SODIUM 125 UG/1
125 TABLET ORAL
Status: DISCONTINUED | OUTPATIENT
Start: 2025-06-30 | End: 2025-07-01 | Stop reason: HOSPADM

## 2025-06-29 RX ORDER — SACCHAROMYCES BOULARDII 250 MG
250 CAPSULE ORAL 2 TIMES DAILY
Status: DISCONTINUED | OUTPATIENT
Start: 2025-06-29 | End: 2025-07-01 | Stop reason: HOSPADM

## 2025-06-29 RX ORDER — LABETALOL HYDROCHLORIDE 5 MG/ML
20 INJECTION, SOLUTION INTRAVENOUS EVERY 4 HOURS PRN
Status: DISCONTINUED | OUTPATIENT
Start: 2025-06-29 | End: 2025-07-01 | Stop reason: HOSPADM

## 2025-06-29 RX ORDER — INSULIN LISPRO 100 [IU]/ML
5 INJECTION, SOLUTION INTRAVENOUS; SUBCUTANEOUS
Status: DISCONTINUED | OUTPATIENT
Start: 2025-06-29 | End: 2025-06-30

## 2025-06-29 RX ORDER — ESCITALOPRAM OXALATE 10 MG/1
10 TABLET ORAL DAILY
Status: DISCONTINUED | OUTPATIENT
Start: 2025-06-30 | End: 2025-07-01 | Stop reason: HOSPADM

## 2025-06-29 RX ORDER — LORATADINE 10 MG/1
5 TABLET ORAL DAILY
Status: DISCONTINUED | OUTPATIENT
Start: 2025-06-30 | End: 2025-06-29

## 2025-06-29 RX ADMIN — SUCRALFATE 1 G: 1 TABLET ORAL at 17:24

## 2025-06-29 RX ADMIN — INSULIN LISPRO 5 UNITS: 100 INJECTION, SOLUTION INTRAVENOUS; SUBCUTANEOUS at 17:56

## 2025-06-29 RX ADMIN — LORATADINE 10 MG: 10 TABLET ORAL at 17:24

## 2025-06-29 RX ADMIN — CYANOCOBALAMIN TAB 500 MCG 1000 MCG: 500 TAB at 17:26

## 2025-06-29 RX ADMIN — HEPARIN SODIUM 5000 UNITS: 5000 INJECTION INTRAVENOUS; SUBCUTANEOUS at 17:29

## 2025-06-29 RX ADMIN — Medication 100 MG: at 17:26

## 2025-06-29 RX ADMIN — PANTOPRAZOLE SODIUM 40 MG: 40 TABLET, DELAYED RELEASE ORAL at 17:24

## 2025-06-29 RX ADMIN — HYDRALAZINE HYDROCHLORIDE 50 MG: 25 TABLET ORAL at 21:20

## 2025-06-29 RX ADMIN — PRAMIPEXOLE DIHYDROCHLORIDE 0.25 MG: 0.25 TABLET ORAL at 21:20

## 2025-06-29 RX ADMIN — Medication 2000 UNITS: at 17:26

## 2025-06-29 RX ADMIN — AMLODIPINE BESYLATE 10 MG: 5 TABLET ORAL at 11:47

## 2025-06-29 RX ADMIN — INSULIN LISPRO 2 UNITS: 100 INJECTION, SOLUTION INTRAVENOUS; SUBCUTANEOUS at 17:55

## 2025-06-29 RX ADMIN — INSULIN GLARGINE 10 UNITS: 100 INJECTION, SOLUTION SUBCUTANEOUS at 21:20

## 2025-06-29 RX ADMIN — HEPARIN SODIUM 5000 UNITS: 5000 INJECTION INTRAVENOUS; SUBCUTANEOUS at 21:20

## 2025-06-29 RX ADMIN — Medication 250 MG: at 17:24

## 2025-06-29 RX ADMIN — HYDRALAZINE HYDROCHLORIDE 50 MG: 25 TABLET ORAL at 16:03

## 2025-06-29 RX ADMIN — PRAVASTATIN SODIUM 40 MG: 40 TABLET ORAL at 17:24

## 2025-06-29 RX ADMIN — CALCIUM ACETATE 667 MG: 667 CAPSULE ORAL at 17:24

## 2025-06-29 RX ADMIN — ACETAMINOPHEN 975 MG: 325 TABLET ORAL at 17:29

## 2025-06-29 RX ADMIN — LIDOCAINE 1 PATCH: 700 PATCH TOPICAL at 13:07

## 2025-06-29 NOTE — ASSESSMENT & PLAN NOTE
Patient complained of  L sided back/lateral pain that radiates from her L back following the rib line to the L upper abdomen. The pain is not relieved with the Lidocaine patch and seems to be worse with movement and palpation.  CXR with left pleural effusion per my read, and some pleuritic nature to the pain  Some reproducible tenderness as well at the level of the scapula    Plan:  - will obtain echo to evaluate for any hypertensive heart disease  - Continue tylenol for pain for now, can add muscle relaxer if needed

## 2025-06-29 NOTE — ASSESSMENT & PLAN NOTE
Recent Labs     06/29/25  1037   CREATININE 5.50*   EGFR 6     Estimated Creatinine Clearance: 8.2 mL/min (A) (by C-G formula based on SCr of 5.5 mg/dL (H)).  Baseline in high 4-  low 5s  Patient's recent hypertensive medication change (Amlodipine to Metoprolol) could be contributing to her new onset hypertension and palpitations. Her elevated Cr in ED to 5.5 (similar to baseline for her Stage V CKD) could be in the setting of worsening kidney function progressing to ESRD v.s medication change. Patient continues to be hypertensive to 187 systolic in the hospital.   Urine output has been decreasing per patient   With headache, chest pressure on left side, and some dyspnea on exertion     Plan:  - 50 mg Hydralazine TID (has been taking 75mg BID at home)  - Labetalol 20 mg q4 prn   - Administer when SBP > 180 or DBP > 100, hold for bradycardia < 50   - Could also consider Nifedipine for this patient    - ACEis and ARBs contraindicated in setting of patient's CKD  - Monitor patient's blood pressures q2h and Cr, repeat BMP  - Consult nephrology, appreciate recs  - Potential palliative care consult in the future as patient does not want to undergo dialysis  - continue phoslo, vitamin d  - will check bmp + phos + mag tomorrow   - renal liberal diet ordered

## 2025-06-29 NOTE — ED ATTENDING ATTESTATION
6/29/2025  I, Melissa Navarro MD, saw and evaluated the patient. I have discussed the patient with the resident/non-physician practitioner and agree with the resident's/non-physician practitioner's findings, Plan of Care, and MDM as documented in the resident's/non-physician practitioner's note, except where noted. All available labs and Radiology studies were reviewed.  I was present for key portions of any procedure(s) performed by the resident/non-physician practitioner and I was immediately available to provide assistance.       At this point I agree with the current assessment done in the Emergency Department.  I have conducted an independent evaluation of this patient a history and physical is as follows:    75-year-old female with history of CKD stage V, COPD, hypertension, presenting for evaluation of elevated home blood pressures.  Patient states that she took her blood pressure yesterday and this morning after she experienced a pounding sensation in her ears and felt lightheaded.  It was elevated to 180 systolic prompting her to come in for evaluation.  She recently had up titration of her dose of hydralazine to 75 mg daily several weeks ago.  Due to concern for reflex tachycardia from her hydralazine, on Thursday her daily 10 mg of amlodipine was discontinued and she was started on 12.5 mg of Lopressor twice daily.  All of her symptoms started after this medication change.    Patient reports fatigue but denies chest pain or shortness of breath.  Reports lightheadedness with standing that resolves with sitting.  No fevers or chills.  Reports a mild headache without vision changes.    Physical Exam  Vitals and nursing note reviewed.   Constitutional:       General: She is not in acute distress.     Appearance: She is well-developed. She is not diaphoretic.   HENT:      Head: Normocephalic and atraumatic.      Right Ear: External ear normal.      Left Ear: External ear normal.      Nose: Nose normal.      Eyes:      Conjunctiva/sclera: Conjunctivae normal.       Cardiovascular:      Rate and Rhythm: Normal rate and regular rhythm.      Pulses: Normal pulses.      Heart sounds: Normal heart sounds. No murmur heard.     No friction rub. No gallop.   Pulmonary:      Effort: Pulmonary effort is normal. No respiratory distress.      Breath sounds: Normal breath sounds. No wheezing or rales.   Abdominal:      General: Bowel sounds are normal. There is no distension.      Palpations: Abdomen is soft.      Tenderness: There is no abdominal tenderness. There is no guarding.     Musculoskeletal:         General: No deformity. Normal range of motion.      Cervical back: Normal range of motion and neck supple.      Right lower leg: No edema.      Left lower leg: No edema.      Comments: No calf swelling or tenderness     Skin:     General: Skin is warm and dry.     Neurological:      Mental Status: She is alert and oriented to person, place, and time.      Motor: No abnormal muscle tone.      Comments: Moves all 4 extremities with normal strength.  Face symmetric.  Cranial nerves II through XII grossly intact.  Clear speech.  Normal mentation.   Psychiatric:         Mood and Affect: Mood normal.           ED Course  ED Course as of 06/29/25 1306   Sun Jun 29, 2025   1300 Patient was markedly hypertensive to 234/102 on arrival.  She reports a mild headache, otherwise has no complaints.  No blurred vision.  No chest pain or difficulty breathing.  Neuroexam normal.  Doubt intracranial hemorrhage or CVA.  Labs reveal mild worsening of kidney function with creatinine of   1303 Creatinine declined to 5.5 from 5.15 3 days ago.  I had a long goals of care discussion with the patient at bedside.  She does not desire dialysis under any circumstances and would like to pursue hospice care once her kidney function declines to the point that she is unable to function at home.  Suspect that the patient's hypertension is due to recent  changes in her blood pressure medication, specifically discontinuing her dose of amlodipine.  Will give dose of oral amlodipine here in the emergency department and assess for improvement in her blood pressure.  Patient and her  were offered admission to the hospital for observation to an ensure improvement in her blood pressure and for recheck of her kidney function in the morning to optimize her remaining kidney function is much as possible.  They are currently considering this versus discharge home with follow-up closely with her outpatient physicians.  They understand that if discharged there is risk for further decline in her kidney function.   1304  I personally interpreted the patient's EKG which reveals sinus bradycardia to 56 bpm with first-degree AV block, normal axis, normal intervals, no ST segment deviation.  Unchanged from most recent EKG with the exception of bradycardia, likely secondary to patient recently being started on Lopressor.  She reports mild lightheadedness but is neurologically intact. Patient denies chest pain or shortness of breath.  Initial troponin 13 with delta of -2.   1304 After discussion of risk versus benefits, patient desires admission to the hospital for further optimization of her blood pressure and kidney function.  Patient will be admitted to [facility] for further management.         Critical Care Time  Procedures

## 2025-06-29 NOTE — PLAN OF CARE
Problem: PAIN - ADULT  Goal: Verbalizes/displays adequate comfort level or baseline comfort level  Description: Interventions:  - Encourage patient to monitor pain and request assistance  - Assess pain using appropriate pain scale  - Administer analgesics as ordered based on type and severity of pain and evaluate response  - Implement non-pharmacological measures as appropriate and evaluate response  - Consider cultural and social influences on pain and pain management  - Notify physician/advanced practitioner if interventions unsuccessful or patient reports new pain  - Educate patient/family on pain management process including their role and importance of  reporting pain   - Provide non-pharmacologic/complimentary pain relief interventions  Outcome: Progressing     Problem: METABOLIC, FLUID AND ELECTROLYTES - ADULT  Goal: Electrolytes maintained within normal limits  Description: INTERVENTIONS:  - Monitor labs and assess patient for signs and symptoms of electrolyte imbalances  - Administer electrolyte replacement as ordered  - Monitor response to electrolyte replacements, including repeat lab results as appropriate  - Instruct patient on fluid and nutrition as appropriate  Outcome: Progressing  Goal: Fluid balance maintained  Description: INTERVENTIONS:  - Monitor labs   - Monitor I/O and WT  - Instruct patient on fluid and nutrition as appropriate  - Assess for signs & symptoms of volume excess or deficit  Outcome: Progressing     Problem: SKIN/TISSUE INTEGRITY - ADULT  Goal: Skin Integrity remains intact(Skin Breakdown Prevention)  Description: Assess:  -Perform Terry assessment every   -Clean and moisturize skin every   -Inspect skin when repositioning, toileting, and assisting with ADLS  -Assess under medical devices such as  every   -Assess extremities for adequate circulation and sensation     Bed Management:  -Have minimal linens on bed & keep smooth, unwrinkled  -Change linens as needed when moist or  perspiring  -Avoid sitting or lying in one position for more than  hours while in bed  -Keep HOB at degrees     Toileting:  -Offer bedside commode  -Assess for incontinence every   -Use incontinent care products after each incontinent episode such as     Activity:  -Mobilize patient  times a day  -Encourage activity and walks on unit  -Encourage or provide ROM exercises   -Turn and reposition patient every  Hours  -Use appropriate equipment to lift or move patient in bed  -Instruct/ Assist with weight shifting every  when out of bed in chair  -Consider limitation of chair time  hour intervals    Skin Care:  -Avoid use of baby powder, tape, friction and shearing, hot water or constrictive clothing  -Relieve pressure over bony prominences using   -Do not massage red bony areas    Next Steps:  -Teach patient strategies to minimize risks such as    -Consider consults to  interdisciplinary teams such as   Outcome: Progressing  Goal: Incision(s), wounds(s) or drain site(s) healing without S/S of infection  Description: INTERVENTIONS  - Assess and document dressing, incision, wound bed, drain sites and surrounding tissue  - Provide patient and family education  - Perform skin care/dressing changes every   Outcome: Progressing  Goal: Pressure injury heals and does not worsen  Description: Interventions:  - Implement low air loss mattress or specialty surface (Criteria met)  - Apply silicone foam dressing  - Instruct/assist with weight shifting every  minutes when in chair   - Limit chair time to  hour intervals  - Use special pressure reducing interventions such as  when in chair   - Apply fecal or urinary incontinence containment device   - Perform passive or active ROM every   - Turn and reposition patient & offload bony prominences every  hours   - Utilize friction reducing device or surface for transfers   - Consider consults to  interdisciplinary teams such as   - Use incontinent care products after each incontinent  episode such as   - Consider nutrition services referral as needed  Outcome: Progressing

## 2025-06-29 NOTE — ED PROVIDER NOTES
Time reflects when diagnosis was documented in both MDM as applicable and the Disposition within this note       Time User Action Codes Description Comment    6/29/2025 11:37 AM Omar Graham Add [I10] High blood pressure     6/29/2025  1:45 PM Omar Graham Add [N28.9] Function kidney decreased     6/29/2025  1:46 PM Omar Graham Add [D64.9] Anemia     6/29/2025  4:01 PM Holly Alvarado Add [N18.5] Stage 5 chronic kidney disease not on chronic dialysis (HCC)           ED Disposition       ED Disposition   Admit    Condition   Stable    Date/Time   Sun Jun 29, 2025  1:45 PM    Comment                  Assessment & Plan       Medical Decision Making  75-year-old female history of stage V CKD, COPD, hypertension presenting due to elevated blood pressures and palpitations.  Patient states that she was recently seen about a week and a half ago and had her blood pressure medications changed for which she was started on Lopressor and discontinued amlodipine.  Patient did not stop amlodipine until this Thursday and started taking the metoprolol.  Starting Saturday patient started feeling her heartbeat in her ears and having palpitations.  Patient checked her blood pressure and it was at 180 systolic at home.  Patient did take her hydralazine and metoprolol this a.m. approximately 1 hour prior to arrival.  Patient also states that she has mild p.o. intake decreased.  Otherwise denies fever, chills, nausea, vomiting, denies any chest pain.  Patient feels fatigued but states that this is more chronic and is not new.  Patient has been having lightheadedness when she stands up which resolves if she takes her time.    Vitals reviewed, on arrival patient is very hypertensive at 230 systolic which during evaluation did decrease to 190 systolic.  Patient heart rate is also mildly bradycardic in the mid 50s.  Physical exam is was unremarkable.  Clear breath sounds bilaterally.  No increased edema in the lower extremities.   Basic labs largely unremarkable at this time.  Patient creatinine is elevated but near baseline for her stage IV CKD.  Concerned that elevated blood pressures are due to recent cessation of amlodipine 2 days prior.  Plan for home dose of amlodipine here in the emergency department and have patient follow-up with family doctor.    Amount and/or Complexity of Data Reviewed  Labs:  Decision-making details documented in ED Course.  Radiology: ordered and independent interpretation performed.    Risk  Prescription drug management.  Decision regarding hospitalization.        ED Course as of 06/30/25 2126   Sun Jun 29, 2025   1053 CBC and differential(!)  Baseline   1053 Blood Pressure(!): 234/102   1053 Temperature: 98.3 °F (36.8 °C)   1053 Pulse: 59   1053 Respirations: 18   1053 SpO2: 96 %   1059 Yesterday started feeling lightheaded with standing, high BP after changes to meds tomorrow, 180 sys at home. Mild SOB. Took hydralazine and metoprolol this AM 1 hr PTA, decreased PO intake. Unsure if they stopped amlodipine prior to thurs.    No CP/N/V/F/Ch   1110 hs TnI 0hr: 13   1129 Comprehensive metabolic panel(!)  Slight elevation in creatinine but near baseline with history of CKD stage V   1214 Patient given home dose amlodipine in the emergency department, will observe for few hours here and monitor.   1329 Workup largely unremarkable at this time but it does appear that patient's kidney function does continue to worsen over the last few visits.  Due to concerns of heading towards end-stage renal disease and needing dialysis, discussed with patient that we could observe her in the hospital to make sure that her kidney function does not continue to decrease.  Patient is agreeable with admission at this time as well as monitoring for concerns of increased blood pressure.  Blood pressure has continued to stay high at this time currently 178/80 after p.o. amlodipine.       Medications   amLODIPine (NORVASC) tablet 10 mg  "(10 mg Oral Given 6/29/25 1147)   lidocaine (LIDODERM) 5 % patch 1 patch (1 patch Topical Medication Applied 6/29/25 1307)       ED Risk Strat Scores   HEART Risk Score      Flowsheet Row Most Recent Value   Heart Score Risk Calculator    History 1 Filed at: 06/29/2025 1313   ECG 1 Filed at: 06/29/2025 1313   Age 2 Filed at: 06/29/2025 1313   Risk Factors 1 Filed at: 06/29/2025 1313   Troponin 1 Filed at: 06/29/2025 1313   HEART Score 6 Filed at: 06/29/2025 1313          HEART Risk Score      Flowsheet Row Most Recent Value   Heart Score Risk Calculator    History 1 Filed at: 06/29/2025 1313   ECG 1 Filed at: 06/29/2025 1313   Age 2 Filed at: 06/29/2025 1313   Risk Factors 1 Filed at: 06/29/2025 1313   Troponin 1 Filed at: 06/29/2025 1313   HEART Score 6 Filed at: 06/29/2025 1313                      No data recorded                            History of Present Illness       Chief Complaint   Patient presents with    Hypertension     Pt states \"felt my pulse in my ears.\" Checked BP today and was in the 180s. Recent adjustments in BP medications.        Past Medical History[1]   Past Surgical History[2]   Family History[3]   Social History[4]   E-Cigarette/Vaping    E-Cigarette Use Never User       E-Cigarette/Vaping Substances    Nicotine No     THC No     CBD No     Flavoring No     Other No     Unknown No       I have reviewed and agree with the history as documented.     75-year-old female history of stage V CKD, COPD, hypertension presenting due to elevated blood pressures and palpitations.  Patient states that she was recently seen about a week and a half ago and had her blood pressure medications changed for which she was started on Lopressor and discontinued amlodipine.  Patient did not stop amlodipine until this Thursday and started taking the metoprolol.  Starting Saturday patient started feeling her heartbeat in her ears and having palpitations.  Patient checked her blood pressure and it was at 180 " systolic at home.  Patient did take her hydralazine and metoprolol this a.m. approximately 1 hour prior to arrival.  Patient also states that she has mild p.o. intake decreased.  Otherwise denies fever, chills, nausea, vomiting, denies any chest pain.  Patient feels fatigued but states that this is more chronic and is not new.  Patient has been having lightheadedness when she stands up which resolves if she takes her time.        Review of Systems   Constitutional:  Negative for chills and fever.   HENT:  Negative for ear pain and sore throat.    Eyes:  Negative for pain and visual disturbance.   Respiratory:  Negative for cough and shortness of breath.    Cardiovascular:  Positive for palpitations. Negative for chest pain and leg swelling.   Gastrointestinal:  Negative for abdominal pain, nausea and vomiting.   Genitourinary:  Negative for dysuria and hematuria.   Musculoskeletal:  Negative for arthralgias and back pain.   Skin:  Negative for color change and rash.   Neurological:  Positive for light-headedness (orthostatic). Negative for seizures and syncope.   All other systems reviewed and are negative.          Objective       ED Triage Vitals   Temperature Pulse Blood Pressure Respirations SpO2 Patient Position - Orthostatic VS   06/29/25 1026 06/29/25 1026 06/29/25 1027 06/29/25 1026 06/29/25 1026 06/29/25 1027   98.3 °F (36.8 °C) 59 (!) 234/102 18 96 % Sitting      Temp Source Heart Rate Source BP Location FiO2 (%) Pain Score    06/29/25 1026 06/29/25 1026 06/29/25 1027 -- 06/29/25 1729    Oral Monitor Left arm  7      Vitals      Date and Time Temp Pulse SpO2 Resp BP Pain Score FACES Pain Rating User   06/30/25 1947 -- 72 94 % -- 159/76 -- -- DII   06/30/25 1437 98.6 °F (37 °C) 69 94 % 19 160/79 -- -- DII   06/30/25 1246 -- -- -- -- -- 5 -- NB   06/30/25 1230 -- 65 95 % 16 168/78 -- -- DII   06/30/25 1121 -- 60 96 % -- 178/91 -- -- DII   06/30/25 0900 -- -- -- -- -- No Pain -- NB   06/30/25 0708 98.7 °F  (37.1 °C) 62 94 % 19 154/75 -- -- DII   06/30/25 0608 -- 66 94 % -- 160/73 -- -- DII   06/29/25 2315 98.4 °F (36.9 °C) 63 93 % 18 145/74 -- -- DII   06/29/25 2118 -- 60 91 % -- 156/76 -- -- DII   06/29/25 1859 -- 58 93 % -- 151/67 -- -- DII   06/29/25 1729 -- -- -- -- -- 7 -- RG   06/29/25 1550 -- 62 95 % -- 188/92 -- -- DII   06/29/25 1525 98.7 °F (37.1 °C) 56 91 % 18 182/79 -- -- DII   06/29/25 1449 98.5 °F (36.9 °C) 59 95 % 16 187/86 -- -- DII   06/29/25 1400 -- 54 96 % -- 170/78 -- -- PS   06/29/25 1330 -- 55 96 % -- 174/78 -- -- AS   06/29/25 1300 -- 54 97 % -- 178/80 -- -- AS   06/29/25 1200 -- 53 95 % -- 181/83 -- -- AS   06/29/25 1145 -- -- -- -- 188/84 -- -- AS   06/29/25 1115 -- 52 98 % -- 193/88 -- -- AS   06/29/25 1101 -- 54 99 % -- 191/86 -- -- MRR   06/29/25 1027 -- -- -- -- 234/102 -- -- DB   06/29/25 1026 98.3 °F (36.8 °C) 59 96 % 18 -- -- -- DB            Physical Exam  Vitals and nursing note reviewed.   Constitutional:       General: She is not in acute distress.     Appearance: She is well-developed.   HENT:      Head: Normocephalic and atraumatic.      Nose: Nose normal. No congestion.     Eyes:      Extraocular Movements: Extraocular movements intact.      Conjunctiva/sclera: Conjunctivae normal.       Cardiovascular:      Rate and Rhythm: Regular rhythm. Bradycardia present.      Pulses: Normal pulses.      Heart sounds: Normal heart sounds. No murmur heard.  Pulmonary:      Effort: Pulmonary effort is normal. No respiratory distress.      Breath sounds: Normal breath sounds.   Chest:      Chest wall: No tenderness.   Abdominal:      General: Abdomen is flat. Bowel sounds are normal.      Palpations: Abdomen is soft.      Tenderness: There is no abdominal tenderness. There is no right CVA tenderness or left CVA tenderness.     Musculoskeletal:         General: No swelling, deformity or signs of injury. Normal range of motion.      Cervical back: Normal range of motion and neck supple. No  rigidity or tenderness.     Skin:     General: Skin is warm and dry.      Capillary Refill: Capillary refill takes less than 2 seconds.      Findings: No bruising, lesion or rash.     Neurological:      General: No focal deficit present.      Mental Status: She is alert.      Cranial Nerves: No cranial nerve deficit.      Sensory: No sensory deficit.     Psychiatric:         Mood and Affect: Mood normal.         Results Reviewed       Procedure Component Value Units Date/Time    Hemoglobin A1c w/EAG Estimation (Prechecked if no A1C within 90 days) [[id number]]  (Abnormal) Collected: 06/29/25 1037    Lab Status: Final result Specimen: Blood from Arm, Right Updated: 06/29/25 2344     Hemoglobin A1C 5.8 %       mg/dl     HS Troponin I 2hr [[id number]]  (Normal) Collected: 06/29/25 1230    Lab Status: Final result Specimen: Blood from Arm, Right Updated: 06/29/25 1302     hs TnI 2hr 11 ng/L      Delta 2hr hsTnI -2 ng/L     HS Troponin I 4hr [[id number]]     Lab Status: No result Specimen: Blood     HS Troponin 0hr (reflex protocol) [[id number]]  (Normal) Collected: 06/29/25 1037    Lab Status: Final result Specimen: Blood from Arm, Right Updated: 06/29/25 1110     hs TnI 0hr 13 ng/L     Comprehensive metabolic panel [[id number]]  (Abnormal) Collected: 06/29/25 1037    Lab Status: Final result Specimen: Blood from Arm, Right Updated: 06/29/25 1104     Sodium 139 mmol/L      Potassium 3.9 mmol/L      Chloride 106 mmol/L      CO2 23 mmol/L      ANION GAP 10 mmol/L      BUN 60 mg/dL      Creatinine 5.50 mg/dL      Glucose 139 mg/dL      Calcium 9.1 mg/dL      AST 23 U/L      ALT 51 U/L      Alkaline Phosphatase 68 U/L      Total Protein 6.4 g/dL      Albumin 3.9 g/dL      Total Bilirubin 0.31 mg/dL      eGFR 6 ml/min/1.73sq m     Narrative:      [employer] guidelines for Chronic Kidney Disease (CKD):     Stage 1 with normal or high GFR (GFR > 90 mL/min/1.73 square meters)    Stage 2 Mild CKD (GFR =  60-89 mL/min/1.73 square meters)    Stage 3A Moderate CKD (GFR = 45-59 mL/min/1.73 square meters)    Stage 3B Moderate CKD (GFR = 30-44 mL/min/1.73 square meters)    Stage 4 Severe CKD (GFR = 15-29 mL/min/1.73 square meters)    Stage 5 End Stage CKD (GFR <15 mL/min/1.73 square meters)  Note: GFR calculation is accurate only with a steady state creatinine    CBC and differential [[id number]]  (Abnormal) Collected: 06/29/25 1037    Lab Status: Final result Specimen: Blood from Arm, Right Updated: 06/29/25 1053     WBC 9.25 Thousand/uL      RBC 3.39 Million/uL      Hemoglobin 10.6 g/dL      Hematocrit 30.5 %      MCV 90 fL      MCH 31.3 pg      MCHC 34.8 g/dL      RDW 13.9 %      MPV 11.5 fL      Platelets 245 Thousands/uL      nRBC 0 /100 WBCs      Segmented % 72 %      Immature Grans % 0 %      Lymphocytes % 19 %      Monocytes % 5 %      Eosinophils Relative 4 %      Basophils Relative 0 %      Absolute Neutrophils 6.60 Thousands/µL      Absolute Immature Grans 0.03 Thousand/uL      Absolute Lymphocytes 1.79 Thousands/µL      Absolute Monocytes 0.48 Thousand/µL      Eosinophils Absolute 0.32 Thousand/µL      Basophils Absolute 0.03 Thousands/µL             XR chest 2 views   ED Interpretation by Omar Graham MD (06/29 1215)   My personal interpretation-no acute cardiopulmonary disease appreciated.      Final Interpretation by Sania Garcia MD (06/30 1048)      No acute cardiopulmonary disease.      Moderate hiatal hernia.            Workstation performed: [id number]             ECG 12 Lead Documentation Only    Date/Time: 6/29/2025 10:45 AM    Performed by: Omar Graham MD  Authorized by: Omar Graham MD    Interpretation:     Interpretation: abnormal    Rate:     ECG rate:  56    ECG rate assessment: bradycardic    Rhythm:     Rhythm: sinus bradycardia    Ectopy:     Ectopy: PAC    QRS:     QRS axis:  Normal    QRS intervals:  Normal  Conduction:     Conduction: abnormal      Abnormal conduction: 1st degree     ST segments:     ST segments:  Normal  T waves:     T waves: flattening      Flattening:  V4, V5 and V6      ED Medication and Procedure Management   Prior to Admission Medications   Prescriptions Last Dose Informant Patient Reported? Taking?   B-D ULTRAFINE III SHORT PEN 31G X 8 MM MISC  Self, Spouse/Significant Other Yes No   Calcium Citrate 250 MG TABS  Self No No   Sig: Take 2 tablets (500 mg total) by mouth in the morning   Coenzyme Q10 (CoQ10) 100 MG CAPS  Self, Spouse/Significant Other Yes No   Sig: Take 100 mg by mouth in the morning Bed time   Cyanocobalamin (Vitamin B12) 1000 MCG TBCR   No No   Sig: Take 1 tablet (1,000 mcg total) by mouth once a week   Probiotic Product (PROBIOTIC BLEND PO)  Self Yes No   Sig: Take by mouth in the morning and before bedtime.   Vitamin D, Cholecalciferol, 25 MCG (1000 UT) TABS  Self Yes No   Sig: Take 2,000 Units by mouth in the morning   acetaminophen (TYLENOL) 500 mg tablet  Self Yes No   Sig: Take 650 mg by mouth as needed   albuterol (Ventolin HFA) 90 mcg/act inhaler  Self, Spouse/Significant Other No No   Sig: Inhale 2 puffs every 6 (six) hours as needed for wheezing   allopurinol (ZYLOPRIM) 100 mg tablet   No No   Sig: Take 0.5 tablets (50 mg total) by mouth 2 (two) times a week DO NOT START UNTIL GOUT FLARE IS RESOLVED   aspirin (ECOTRIN LOW STRENGTH) 81 mg EC tablet  Self, Spouse/Significant Other No No   Sig: Take 1 tablet (81 mg total) by mouth daily Do not start before October 31, 2023.   calcium acetate (PHOSLO) capsule   No No   Sig: Take 1 capsule (667 mg total) by mouth daily after dinner   desloratadine (CLARINEX) 5 MG tablet   No No   Sig: TAKE 1 TABLET (5 MG TOTAL) BY MOUTH DAILY.   docusate sodium (COLACE) 100 mg capsule   Yes No   Sig: Take 100 mg by mouth daily as needed for constipation   escitalopram (LEXAPRO) 10 mg tablet   No No   Sig: Take 1 tablet (10 mg total) by mouth daily   gabapentin (NEURONTIN) 100 mg capsule   Yes No   Sig: Take 100 mg  by mouth if needed   hydrALAZINE (APRESOLINE) 25 mg tablet   No No   Sig: Take 1 tablet (25 mg total) by mouth in the morning and 1 tablet (25 mg total) before bedtime. Take with 50 mg hydralazine daily.   hydrALAZINE (APRESOLINE) 50 mg tablet   Yes No   Sig: Take 50 mg by mouth in the morning and 50 mg before bedtime.   insulin aspart (NovoLOG) 100 units/mL injection  Self, Spouse/Significant Other Yes No   Sig: Inject 5 Units under the skin 2 (two) times a day with meals 5 units with lunch and dinner   insulin degludec (Tresiba FlexTouch) 100 units/mL injection pen   Yes No   Sig: Inject 10 Units under the skin daily at bedtime   levothyroxine 125 mcg tablet   No No   Sig: Take 1 tablet (125 mcg total) by mouth daily   metoprolol tartrate (LOPRESSOR) 25 mg tablet   No No   Sig: Take 0.5 tablets (12.5 mg total) by mouth every 12 (twelve) hours   pantoprazole (PROTONIX) 40 mg tablet   No No   Sig: Take 1 tablet (40 mg total) by mouth 2 (two) times a day   pramipexole (MIRAPEX) 0.25 mg tablet   No No   Sig: Take 1 tablet (0.25 mg total) by mouth daily at bedtime   psyllium (METAMUCIL) 58.6 % packet   Yes No   Sig: Take 1 packet by mouth if needed   rosuvastatin (CRESTOR) 5 mg tablet   No No   Sig: Take 1 tablet (5 mg total) by mouth daily   sucralfate (CARAFATE) 1 g tablet   No No   Sig: Take 1 tablet (1 g total) by mouth 3 (three) times a day before meals   torsemide (DEMADEX) 10 mg tablet   No No   Sig: Take 1 tablet (10 mg total) by mouth daily as needed (lower extremity swelling) Take 10 mg. Daily except Monday, Wednesday, Friday 20 mg.      Facility-Administered Medications: None     Current Discharge Medication List        CONTINUE these medications which have NOT CHANGED    Details   acetaminophen (TYLENOL) 500 mg tablet Take 650 mg by mouth as needed      albuterol (Ventolin HFA) 90 mcg/act inhaler Inhale 2 puffs every 6 (six) hours as needed for wheezing  Qty: 54 g, Refills: 0    Comments: Substitution to a  formulary equivalent within the same pharmaceutical class is authorized.  Associated Diagnoses: Asthma, unspecified asthma severity, unspecified whether complicated, unspecified whether persistent      allopurinol (ZYLOPRIM) 100 mg tablet Take 0.5 tablets (50 mg total) by mouth 2 (two) times a week DO NOT START UNTIL GOUT FLARE IS RESOLVED  Qty: 12 tablet, Refills: 0    Associated Diagnoses: Acute gout due to renal impairment involving left foot      aspirin (ECOTRIN LOW STRENGTH) 81 mg EC tablet Take 1 tablet (81 mg total) by mouth daily Do not start before October 31, 2023.  Refills: 0    Associated Diagnoses: Cerebrovascular accident (CVA), unspecified mechanism ([facility])      B-D ULTRAFINE III SHORT PEN 31G X 8 MM MISC Refills: 3      calcium acetate (PHOSLO) capsule Take 1 capsule (667 mg total) by mouth daily after dinner  Qty: 90 capsule, Refills: 3    Associated Diagnoses: Stage 5 chronic kidney disease not on chronic dialysis ([facility]); Persistent proteinuria; Benign hypertensive kidney disease with chronic kidney disease stage V or end stage renal disease ([facility]); Edema of extremity; Anemia in stage 5 chronic kidney disease, not on chronic dialysis  ([facility]); Secondary hyperparathyroidism of renal origin ([facility]); Mixed hyperlipidemia      Calcium Citrate 250 MG TABS Take 2 tablets (500 mg total) by mouth in the morning    Associated Diagnoses: Vitamin D deficiency      Coenzyme Q10 (CoQ10) 100 MG CAPS Take 100 mg by mouth in the morning Bed time      Cyanocobalamin (Vitamin B12) 1000 MCG TBCR Take 1 tablet (1,000 mcg total) by mouth once a week  Qty: 12 tablet, Refills: 0    Associated Diagnoses: B12 deficiency      desloratadine (CLARINEX) 5 MG tablet TAKE 1 TABLET (5 MG TOTAL) BY MOUTH DAILY.  Qty: 90 tablet, Refills: 1    Associated Diagnoses: Allergic sinusitis      docusate sodium (COLACE) 100 mg capsule Take 100 mg by mouth daily as needed for constipation      escitalopram (LEXAPRO) 10 mg tablet Take 1 tablet (10  mg total) by mouth daily  Qty: 90 tablet, Refills: 3    Associated Diagnoses: Fibromyalgia      gabapentin (NEURONTIN) 100 mg capsule Take 100 mg by mouth if needed      !! hydrALAZINE (APRESOLINE) 25 mg tablet Take 1 tablet (25 mg total) by mouth in the morning and 1 tablet (25 mg total) before bedtime. Take with 50 mg hydralazine daily.  Qty: 180 tablet, Refills: 3    Associated Diagnoses: Secondary hyperparathyroidism of renal origin (HCC); Essential hypertension; Parenchymal renal hypertension, stage 1 through stage 4 or unspecified chronic kidney disease      !! hydrALAZINE (APRESOLINE) 50 mg tablet Take 50 mg by mouth in the morning and 50 mg before bedtime.      insulin aspart (NovoLOG) 100 units/mL injection Inject 5 Units under the skin 2 (two) times a day with meals 5 units with lunch and dinner      insulin degludec (Tresiba FlexTouch) 100 units/mL injection pen Inject 10 Units under the skin daily at bedtime      levothyroxine 125 mcg tablet Take 1 tablet (125 mcg total) by mouth daily  Qty: 90 tablet, Refills: 0    Associated Diagnoses: Acquired hypothyroidism      metoprolol tartrate (LOPRESSOR) 25 mg tablet Take 0.5 tablets (12.5 mg total) by mouth every 12 (twelve) hours  Qty: 30 tablet, Refills: 2    Associated Diagnoses: Primary hypertension; Stage 5 chronic kidney disease not on chronic dialysis (HCC); Reflex tachycardia      pantoprazole (PROTONIX) 40 mg tablet Take 1 tablet (40 mg total) by mouth 2 (two) times a day  Qty: 60 tablet, Refills: 5    Associated Diagnoses: Acute blood loss anemia      pramipexole (MIRAPEX) 0.25 mg tablet Take 1 tablet (0.25 mg total) by mouth daily at bedtime  Qty: 90 tablet, Refills: 1    Associated Diagnoses: RLS (restless legs syndrome)      Probiotic Product (PROBIOTIC BLEND PO) Take by mouth in the morning and before bedtime.      psyllium (METAMUCIL) 58.6 % packet Take 1 packet by mouth if needed      rosuvastatin (CRESTOR) 5 mg tablet Take 1 tablet (5 mg  total) by mouth daily  Qty: 90 tablet, Refills: 0    Associated Diagnoses: Hypertriglyceridemia      sucralfate (CARAFATE) 1 g tablet Take 1 tablet (1 g total) by mouth 3 (three) times a day before meals  Qty: 270 tablet, Refills: 2    Associated Diagnoses: Gastroparesis diabeticorum  ([facility]); Gastroesophageal reflux disease without esophagitis      torsemide (DEMADEX) 10 mg tablet Take 1 tablet (10 mg total) by mouth daily as needed (lower extremity swelling) Take 10 mg. Daily except Monday, Wednesday, Friday 20 mg.  Qty: 150 tablet, Refills: 1    Associated Diagnoses: Parenchymal renal hypertension, stage 5 chronic kidney disease or end stage renal disease ([facility]); CKD (chronic kidney disease) stage 5, GFR less than 15 ml/min ([facility]); Anemia in stage 5 chronic kidney disease, not on chronic dialysis  ([facility]); Diabetic nephropathy associated with type 2 diabetes mellitus ([facility]); Secondary hyperparathyroidism of renal origin ([facility]); Mixed hyperlipidemia; Vitamin D deficiency      Vitamin D, Cholecalciferol, 25 MCG (1000 UT) TABS Take 2,000 Units by mouth in the morning       !! - Potential duplicate medications found. Please discuss with provider.        No discharge procedures on file.  ED SEPSIS DOCUMENTATION   Time reflects when diagnosis was documented in both MDM as applicable and the Disposition within this note       Time User Action Codes Description Comment    6/29/2025 11:37 AM Omar Graham [I10] High blood pressure     6/29/2025  1:45 PM Omar Graham [N28.9] Function kidney decreased     6/29/2025  1:46 PM Omar Graham [D64.9] Anemia     6/29/2025  4:01 PM Holly Alvarado Add [N18.5] Stage 5 chronic kidney disease not on chronic dialysis ([facility])                    Omar Graham MD  06/30/25 2125         [1]   Past Medical History:  Diagnosis Date    Actinic keratosis     Acute blood loss anemia 09/24/2024    Acute cystitis without hematuria 04/28/2023    Acute cystitis without hematuria  "04/28/2023    Acute respiratory failure with hypoxia (HCC) 02/15/2024    Acute-on-chronic kidney injury  (HCC)     NIKOS (acute kidney injury) (HCC) 05/08/2020    Allergic     Allergic rhinitis     Ambulatory dysfunction 10/22/2020    AMS (altered mental status) 07/14/2020    Anesthesia     pt reports \"had to use double lumen endo tube for hiatal hernia repair/so surgeon could get to where he needed to work\"    Arthritis     Aspiration into airway     At high risk for falls 07/17/2024    Michael esophagus 1993    Lot of stress with children    Basal cell carcinoma 2007    left cheek     BCC (basal cell carcinoma) 05/27/2021    Left Nasal tip    Breast discharge 06/19/2023    Breast pain 06/19/2023    Cancer (HCC)     squamous cell cancer on forhead    Cancer (HCC)     basal cell on nose     Cholelithiasis     Chronic kidney disease 2000, 2018    Stones, kidney disease stage 4    Chronic pain disorder     bilat feet and joint pain on occas    Colon polyp     Confusion 10/30/2023    Constipation     COPD (chronic obstructive pulmonary disease) (HCC)     COVID-19 08/2021    recovered at home/did receive monoclonal infusion    COVID-19 virus infection 08/16/2021    Dental bridge present     Depression     Diabetes mellitus (HCC)     Type 2    Diabetic neuropathy (HCC)     Difficulty walking 2017    Disease of thyroid gland     Dyspnea     Elevated liver enzymes 04/04/2023    Epigastric abdominal pain with severe diabetic gastroparesis, status post EGD/Botox injection, 5/14 05/17/2021    Facial abrasion, initial encounter 03/22/2023    Fall 03/22/2023    Family history of thyroid problem     Fatty liver     Fibromyalgia, primary     Fracture of orbital floor, blow-out, right, closed, with routine healing, subsequent encounter 03/22/2023    Fracture of orbital floor, right side, initial encounter for closed fracture (HCC) 03/22/2023    GERD (gastroesophageal reflux disease)     Gout     Heart burn     Hiatal hernia     " History of cerebrovascular accident (CVA) due to ischemia 10/31/2023    10/23: MRI with foci of late acute infarct involving the right putamen       History of cholecystectomy 10/27/2023    History of colon polyps 07/30/2021    History of kidney stones 09/29/2020    Hx of recurrent kidney stones    History of kidney stones 09/29/2020    Hx of recurrent kidney stones  Formatting of this note might be different from the original. Hx of recurrent kidney stones  Last Assessment & Plan:  Formatting of this note might be different from the original. We will set her up for follow-up in 3 months with a KUB prior.  She knows to call if she has any kidney stone type pain in the meantime.    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 01/06/2023    Kidney problem     Kidney stone     Left hip pain 10/05/2023    Leukocytosis 07/14/2020    Memory loss Julu2 2020    Motion sickness     Motion sickness     Multiple closed fractures of ribs of right side 03/22/2023    Nasal bones, closed fracture 03/22/2023    Neck pain     Obesity 1978    Obesity (BMI 30.0-34.9)     Olecranon bursitis of right elbow 07/29/2024    Other chest pain 09/13/2021    Other fatigue 09/21/2023    Palpitations 07/18/2023    Peripheral neuropathy     Pollen allergies     Postural dizziness with presyncope 08/16/2023    Preoperative clearance 06/27/2019    Reactive depression 07/17/2024    RLS (restless legs syndrome)     S/P foot surgery 07/20/2022    SCC (squamous cell carcinoma) 05/04/2021    left mid forehead    SCC (squamous cell carcinoma) 2023    chest    Seasonal allergies     Shingles 01 05 23    Sleep apnea     has inspire     Squamous cell skin cancer 2007    left cheek     Stroke (HCC)     Swollen ankles     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, left     great toe fusion    Transaminitis 06/03/2024    Urinary incontinence     Urinary tract infection 03/28/2022    Wears glasses    [2]   Past Surgical History:  Procedure Laterality Date    ABDOMINAL SURGERY  1997,2015,1972    Nissen x2 1972 tubal ligarion    ARTHROSCOPY KNEE Right     BREAST BIOPSY      stereotactic-benign    BREAST BIOPSY      stereo-benign    BREAST CYST EXCISION Bilateral     benign- done in [place]-neg    BREAST EXCISIONAL BIOPSY      unknown date-benign    BREAST EXCISIONAL BIOPSY      unknown date-benign    BREAST EXCISIONAL BIOPSY      unknown date-benign    BREAST EXCISIONAL BIOPSY      unknown age-benign    CARDIAC CATHETERIZATION      CATARACT EXTRACTION Right     CHOLECYSTECTOMY      COLONOSCOPY      EXAMINATION UNDER ANESTHESIA N/A 06/24/2021    Procedure: EXAM UNDER ANESTHESIA (EUA), DISE;  Surgeon: Gregory Maier MD;  Location: [facility];  Service: ENT    HERNIA REPAIR      hiatal    HIATAL HERNIA REPAIR      KNEE SURGERY Right     Torn maniscus lap surg    LIPOSUCTION      LYMPH NODE BIOPSY      MOHS SURGERY  05/20/2021    left mid forehead-Mickey    MOHS SURGERY Left 05/27/2021    Left nasal tip- mickey     VT LIGATION/BIOPSY TEMPORAL ARTERY Left 01/05/2023    Procedure: BIOPSY ARTERY TEMPORAL;  Surgeon: Alec Dennis DO;  Location: [facility];  Service: Vascular    VT OPEN IMPLANTATION CRANIAL NERVE BOOM & PULSE GEN N/A 11/10/2021    Procedure: INSERTION UPPER AIRWAY STIMULATOR, INSPIRE IMPLANT;  Surgeon: Gregory Maier MD;  Location: [facility];  Service: ENT    VT UNLISTED PROCEDURE FOOT/TOES Right 07/19/2022    Procedure: 1st metatarsal phalangeal joint fusion;  Surgeon: Dede Bonner DPM;  Location: [facility];  Service: Podiatry    REDUCTION MAMMAPLASTY Bilateral 2000    REDUCTION MAMMAPLASTY      SKIN BIOPSY      SKIN CANCER EXCISION   2007    squamous cell carcinoma     SKIN CANCER EXCISION  2007    basal cell carcinoma    SKIN CANCER EXCISION  2023    SCCIS chest    SQUAMOUS CELL CARCINOMA EXCISION      TOE SURGERY Right 08/04/2022    Right Great Toe Fusion    TONSILLECTOMY      TUBAL LIGATION      UPPER GASTROINTESTINAL ENDOSCOPY      US GUIDED BREAST BIOPSY RIGHT COMPLETE Right 07/18/2022   [3]   Family History  Problem Relation Name Age of Onset    Heart disease Mother Michelle Hopper     Depression Mother Michelle Hopper     Hypertension Mother Michelle Hopper     COPD Mother Michelle Hopper     Hearing loss Mother Michelle Hopper     Anxiety disorder Mother Michelle Hopper     Heart disease Father Jesus Hopper     Lung cancer Father Jesus Hopper 70        Smoker     Cancer Father Jesus Hopper         brain    Alcohol abuse Father Jesus Hopper     Dementia Father Jesus Hopper     Hypertension Father Jesus Hopper     Thyroid disease Father Jesus Hopper     COPD Father Jesus Hopper     Arthritis Father Jesus Hopper     Brain cancer Father Jesus Hopper 74    Brain cancer Sister Kiesha     Hypertension Sister Kiesha     Diabetes Sister Kiesha     Heart disease Sister Kiesha     Thyroid disease Sister Kiesha     Cancer Sister Kiesha         Lympoma, lung    Lung cancer Sister Kiesha 79    Anxiety disorder Sister Kiesha     Migraines Sister Kiesha     Hypertension Brother Mendoza Hopper     Diabetes Brother Mendoza Hopper     Cancer Brother Mendoza Hopper         Throat    Dementia Brother Mendoza Hopper     Stroke Brother Jesus, michael, dhaval     Hypertension Brother Jesus, michael, dhaval     Heart disease Brother Dukes     Diabetes Brother Dukes     Hypertension Brother Michael     Allergies Brother dhaval     No Known Problems Son      No Known Problems Son      Brain cancer Paternal Aunt          unknown age    Breast cancer Neg Hx     [4]   Social History  Tobacco Use    Smoking status: Former     Current packs/day: 0.00     Average packs/day: 2.0 packs/day for 10.0 years (20.0 ttl pk-yrs)      Types: Cigarettes     Start date: 1966     Quit date: 1976     Years since quittin.5     Passive exposure: Past    Smokeless tobacco: Never    Tobacco comments:     Smoked 2 pack a day   Vaping Use    Vaping status: Never Used   Substance Use Topics    Alcohol use: Yes     Comment: 1 or 2 a year    Drug use: No        Omar Graham MD  [phone]

## 2025-06-29 NOTE — H&P
H&P - Hospitalist   Name: Trina Davis 75 y.o. female I MRN: [id number]  Unit/Bed#: S -01 I Date of Admission: 6/29/2025   Date of Service: 6/29/2025 I Hospital Day: 0     Assessment & Plan  Stage 5 chronic kidney disease not on chronic dialysis (HCC)  Renovascular hypertension  Recent Labs     06/29/25  1037   CREATININE 5.50*   EGFR 6     Estimated Creatinine Clearance: 8.2 mL/min (A) (by C-G formula based on SCr of 5.5 mg/dL (H)).  Baseline in high 4-  low 5s  Patient's recent hypertensive medication change (Amlodipine to Metoprolol) could be contributing to her new onset hypertension and palpitations. Her elevated Cr in ED to 5.5 (similar to baseline for her Stage V CKD) could be in the setting of worsening kidney function progressing to ESRD v.s medication change. Patient continues to be hypertensive to 187 systolic in the hospital.   Urine output has been decreasing per patient   With headache, chest pressure on left side, and some dyspnea on exertion     Plan:  - 50 mg Hydralazine TID (has been taking 75mg BID at home)  - Labetalol 20 mg q4 prn   - Administer when SBP > 180 or DBP > 100, hold for bradycardia < 50   - Could also consider Nifedipine for this patient    - ACEis and ARBs contraindicated in setting of patient's CKD  - Monitor patient's blood pressures q2h and Cr, repeat BMP  - Consult nephrology, appreciate recs  - Potential palliative care consult in the future as patient does not want to undergo dialysis  - continue phoslo, vitamin d  - will check bmp + phos + mag tomorrow   - renal liberal diet ordered  Left lateral abdominal pain  Patient complained of  L sided back/lateral pain that radiates from her L back following the rib line to the L upper abdomen. The pain is not relieved with the Lidocaine patch and seems to be worse with movement and palpation.  CXR with left pleural effusion per my read, and some pleuritic nature to the pain  Some reproducible tenderness as well at the level of  "the scapula    Plan:  - will obtain echo to evaluate for any hypertensive heart disease  - Continue tylenol for pain for now, can add muscle relaxer if needed  Type 2 diabetes mellitus with kidney complication, with long-term current use of insulin ([facility])  Lab Results   Component Value Date    HGBA1C 6.3 (H) 03/20/2025       No results for input(s): \"POCGLU\" in the last 72 hours.    Blood Sugar Average: Last 72 hrs:    On lantus 10 units at home nightly, 5 units lispro with lunch and dinner  Will continue this and sliding scale      VTE Pharmacologic Prophylaxis: VTE Score: 4 High Risk (Score >/= 5) - Pharmacological DVT Prophylaxis Ordered: heparin. Sequential Compression Devices Ordered.  Code Status: Level 3 - DNAR and DNI   Discussion with family: Updated  () at bedside.    Anticipated Length of Stay: Patient will be admitted on an inpatient basis with an anticipated length of stay of greater than 2 midnights secondary to hypertension.    History of Present Illness   Chief Complaint:     Trina Davis is a 75 y.o. female with a PMH of Stage V CKD, COPD, and HTN who presents with palpitations and increased blood pressure readings at home. Last week, she was advised by her PCP to discontinue her Amlodipine and start Metoprolol due to reflex tachycardia presumably from her hydralazine. On Thursday, she made this switch. On Friday/Saturday she began to have palpitations, could feel her pulses in her ears, and felt lightheaded. This morning, she took her Hydralazine and Metoprolol around 9 AM, but her blood pressure readings remained high and she still felt slight palpitations, prompting her to come to the ED. She endorsed decreased PO intake due to having little appetite from her CKD. She also admitted to chronic fatigue, lightheadedness when sitting up, and a mild headache. She denied any fever, chills, N/V, vision changes, dysuria or chest pain. She did endorse some L sided back/lateral pain that " radiates from her L back following the rib line to the L middle abdomen. She says this pain is new and is not relieved with the Lidocaine patch. She also endorsed intermittent diarrhea and constipation that she has had for the past couple of months. She attributes some of her constipation to her decreased PO intake and uses Colace and a fiber gummy to relieve it.     Review of Systems   Constitutional:  Positive for appetite change. Negative for activity change, chills and fever.   HENT:  Positive for postnasal drip. Negative for tinnitus and trouble swallowing.    Respiratory:  Negative for shortness of breath.    Cardiovascular:  Negative for chest pain and palpitations.   Gastrointestinal:  Positive for constipation and diarrhea. Negative for abdominal pain, blood in stool, nausea and vomiting.   Genitourinary:  Negative for dysuria.   Neurological:  Positive for light-headedness and headaches. Negative for dizziness.       Historical Information   Past Medical History[1]  Past Surgical History[2]  Social History[3]  E-Cigarette/Vaping    E-Cigarette Use Never User      E-Cigarette/Vaping Substances    Nicotine No     THC No     CBD No     Flavoring No     Other No     Unknown No        Social History:  Marital Status: /Civil Union   Occupation: Retired  Patient Pre-hospital Living Situation: Home  Patient Pre-hospital Level of Mobility: walks  Patient Pre-hospital Diet Restrictions: n/a    Meds/Allergies   I have reviewed home medications with patient personally.  Prior to Admission medications    Medication Sig Start Date End Date Taking? Authorizing Provider   acetaminophen (TYLENOL) 500 mg tablet Take 650 mg by mouth as needed    Historical Provider, MD   albuterol (Ventolin HFA) 90 mcg/act inhaler Inhale 2 puffs every 6 (six) hours as needed for wheezing 8/4/22   Donaldo Foster DO   allopurinol (ZYLOPRIM) 100 mg tablet Take 0.5 tablets (50 mg total) by mouth 2 (two) times a week DO NOT START UNTIL  GOUT FLARE IS RESOLVED 6/16/25 9/14/25  Cheikh Parks MD   aspirin (ECOTRIN LOW STRENGTH) 81 mg EC tablet Take 1 tablet (81 mg total) by mouth daily Do not start before October 31, 2023. 10/31/23   Sonia Chen PA-C   B-D ULTRAFINE III SHORT PEN 31G X 8 MM MISC  7/26/19   Historical Provider, MD   calcium acetate (PHOSLO) capsule Take 1 capsule (667 mg total) by mouth daily after dinner 3/10/25   Donaldo Baires MD   Calcium Citrate 250 MG TABS Take 2 tablets (500 mg total) by mouth in the morning 7/17/24   Darren Burgos MD   Coenzyme Q10 (CoQ10) 100 MG CAPS Take 100 mg by mouth in the morning Bed time    Historical Provider, MD   Cyanocobalamin (Vitamin B12) 1000 MCG TBCR Take 1 tablet (1,000 mcg total) by mouth once a week 5/1/25   Cheikh Parks MD   desloratadine (CLARINEX) 5 MG tablet TAKE 1 TABLET (5 MG TOTAL) BY MOUTH DAILY. 3/26/25   Maryse Rae MD   docusate sodium (COLACE) 100 mg capsule Take 100 mg by mouth daily as needed for constipation    Historical Provider, MD   escitalopram (LEXAPRO) 10 mg tablet Take 1 tablet (10 mg total) by mouth daily 10/14/24 10/9/25  Francisco Pineda MD   gabapentin (NEURONTIN) 100 mg capsule Take 100 mg by mouth if needed    Historical Provider, MD   hydrALAZINE (APRESOLINE) 25 mg tablet Take 1 tablet (25 mg total) by mouth in the morning and 1 tablet (25 mg total) before bedtime. Take with 50 mg hydralazine daily. 6/12/25   Donaldo Baires MD   hydrALAZINE (APRESOLINE) 50 mg tablet Take 50 mg by mouth in the morning and 50 mg before bedtime.    Historical Provider, MD   insulin aspart (NovoLOG) 100 units/mL injection Inject 5 Units under the skin 2 (two) times a day with meals 5 units with lunch and dinner    Historical Provider, MD   insulin degludec (Tresiba FlexTouch) 100 units/mL injection pen Inject 10 Units under the skin daily at bedtime    Historical Provider, MD   levothyroxine 125 mcg tablet Take 1 tablet (125 mcg total) by mouth daily  4/8/25   Tree Aguilar MD   methenamine hippurate (HIPREX) 1 g tablet TAKE ONE TABLET BY MOUTH TWO TIMES A DAY WITH MEALS 6/26/25   Ronn Woodson PA-C   metoprolol tartrate (LOPRESSOR) 25 mg tablet Take 0.5 tablets (12.5 mg total) by mouth every 12 (twelve) hours 6/19/25   Maryse Addison MD   pantoprazole (PROTONIX) 40 mg tablet Take 1 tablet (40 mg total) by mouth 2 (two) times a day 1/20/25   Tree Aguilar MD   pramipexole (MIRAPEX) 0.25 mg tablet Take 1 tablet (0.25 mg total) by mouth daily at bedtime 6/27/25   Maryse Rae MD   Probiotic Product (PROBIOTIC BLEND PO) Take by mouth in the morning and before bedtime.    Historical Provider, MD   psyllium (METAMUCIL) 58.6 % packet Take 1 packet by mouth if needed    Historical Provider, MD   rosuvastatin (CRESTOR) 5 mg tablet Take 1 tablet (5 mg total) by mouth daily 4/24/25   Leta Martinez MD   sucralfate (CARAFATE) 1 g tablet Take 1 tablet (1 g total) by mouth 3 (three) times a day before meals 5/1/25   Josefa Coe PA-C   torsemide (DEMADEX) 10 mg tablet Take 1 tablet (10 mg total) by mouth daily as needed (lower extremity swelling) Take 10 mg. Daily except Monday, Wednesday, Friday 20 mg. 4/8/25   Tree Aguilar MD   Vitamin D, Cholecalciferol, 25 MCG (1000 UT) TABS Take 2,000 Units by mouth in the morning    Historical Provider, MD     Allergies   Allergen Reactions    Ciprofloxacin Hives    Pollen Extract Allergic Rhinitis    Sulfamethoxazole-Trimethoprim Tongue Swelling       Objective :  Temp:  [98.3 °F (36.8 °C)-98.7 °F (37.1 °C)] 98.7 °F (37.1 °C)  HR:  [52-62] 62  BP: (170-234)/() 188/92  Resp:  [16-18] 18  SpO2:  [91 %-99 %] 95 %  O2 Device: None (Room air)    Physical Exam  Constitutional:       General: She is not in acute distress.     Appearance: Normal appearance.   HENT:      Mouth/Throat:      Mouth: Mucous membranes are moist.     Eyes:      Extraocular Movements: Extraocular movements intact.        Cardiovascular:      Rate and Rhythm: Regular rhythm. Bradycardia present.   Pulmonary:      Effort: Pulmonary effort is normal. No respiratory distress.      Breath sounds: Normal breath sounds. No rales.   Abdominal:      General: There is no distension.      Palpations: Abdomen is soft. There is no mass.      Tenderness: There is no abdominal tenderness. There is no right CVA tenderness or left CVA tenderness.      Comments: Mild left middle quadrant tenderness to palpation.     Musculoskeletal:      Right lower leg: No edema.      Left lower leg: No edema.     Skin:     General: Skin is warm and dry.     Neurological:      Mental Status: She is alert and oriented to person, place, and time.          Lines/Drains:            Lab Results: I have reviewed the following results:  Results from last 7 days   Lab Units 06/29/25  1037   WBC Thousand/uL 9.25   HEMOGLOBIN g/dL 10.6*   HEMATOCRIT % 30.5*   PLATELETS Thousands/uL 245   SEGS PCT % 72   LYMPHO PCT % 19   MONO PCT % 5   EOS PCT % 4     Results from last 7 days   Lab Units 06/29/25  1037   SODIUM mmol/L 139   POTASSIUM mmol/L 3.9   CHLORIDE mmol/L 106   CO2 mmol/L 23   BUN mg/dL 60*   CREATININE mg/dL 5.50*   ANION GAP mmol/L 10   CALCIUM mg/dL 9.1   ALBUMIN g/dL 3.9   TOTAL BILIRUBIN mg/dL 0.31   ALK PHOS U/L 68   ALT U/L 51   AST U/L 23   GLUCOSE RANDOM mg/dL 139             Lab Results   Component Value Date    HGBA1C 6.3 (H) 03/20/2025    HGBA1C 6.0 (H) 10/31/2024    HGBA1C 6.4 (H) 09/24/2024           Imaging Results Review: I reviewed radiology reports from this admission including: chest xray.      Administrative Statements       ** Please Note: This note has been constructed using a voice recognition system. **         [1]   Past Medical History:  Diagnosis Date    Actinic keratosis     Acute blood loss anemia 09/24/2024    Acute cystitis without hematuria 04/28/2023    Acute cystitis without hematuria 04/28/2023    Acute respiratory failure with  "hypoxia (HCC) 02/15/2024    Acute-on-chronic kidney injury  (HCC)     NIKOS (acute kidney injury) (HCC) 05/08/2020    Allergic     Allergic rhinitis     Ambulatory dysfunction 10/22/2020    AMS (altered mental status) 07/14/2020    Anesthesia     pt reports \"had to use double lumen endo tube for hiatal hernia repair/so surgeon could get to where he needed to work\"    Arthritis     Aspiration into airway     At high risk for falls 07/17/2024    Michael esophagus 1993    Lot of stress with children    Basal cell carcinoma 2007    left cheek     BCC (basal cell carcinoma) 05/27/2021    Left Nasal tip    Breast discharge 06/19/2023    Breast pain 06/19/2023    Cancer (HCC)     squamous cell cancer on forhead    Cancer (HCC)     basal cell on nose     Cholelithiasis     Chronic kidney disease 2000, 2018    Stones, kidney disease stage 4    Chronic pain disorder     bilat feet and joint pain on occas    Colon polyp     Confusion 10/30/2023    Constipation     COPD (chronic obstructive pulmonary disease) (HCC)     COVID-19 08/2021    recovered at home/did receive monoclonal infusion    COVID-19 virus infection 08/16/2021    Dental bridge present     Depression     Diabetes mellitus (HCC)     Type 2    Diabetic neuropathy (HCC)     Difficulty walking 2017    Disease of thyroid gland     Dyspnea     Elevated liver enzymes 04/04/2023    Epigastric abdominal pain with severe diabetic gastroparesis, status post EGD/Botox injection, 5/14 05/17/2021    Facial abrasion, initial encounter 03/22/2023    Fall 03/22/2023    Family history of thyroid problem     Fatty liver     Fibromyalgia, primary     Fracture of orbital floor, blow-out, right, closed, with routine healing, subsequent encounter 03/22/2023    Fracture of orbital floor, right side, initial encounter for closed fracture (HCC) 03/22/2023    GERD (gastroesophageal reflux disease)     Gout     Heart burn     Hiatal hernia     History of cerebrovascular accident (CVA) due to " ischemia 10/31/2023    10/23: MRI with foci of late acute infarct involving the right putamen       History of cholecystectomy 10/27/2023    History of colon polyps 07/30/2021    History of kidney stones 09/29/2020    Hx of recurrent kidney stones    History of kidney stones 09/29/2020    Hx of recurrent kidney stones  Formatting of this note might be different from the original. Hx of recurrent kidney stones  Last Assessment & Plan:  Formatting of this note might be different from the original. We will set her up for follow-up in 3 months with a KUB prior.  She knows to call if she has any kidney stone type pain in the meantime.    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 01/06/2023    Kidney problem     Kidney stone     Left hip pain 10/05/2023    Leukocytosis 07/14/2020    Memory loss Julu2 2020    Motion sickness     Motion sickness     Multiple closed fractures of ribs of right side 03/22/2023    Nasal bones, closed fracture 03/22/2023    Neck pain     Obesity 1978    Obesity (BMI 30.0-34.9)     Olecranon bursitis of right elbow 07/29/2024    Other chest pain 09/13/2021    Other fatigue 09/21/2023    Palpitations 07/18/2023    Peripheral neuropathy     Pollen allergies     Postural dizziness with presyncope 08/16/2023    Preoperative clearance 06/27/2019    Reactive depression 07/17/2024    RLS (restless legs syndrome)     S/P foot surgery 07/20/2022    SCC (squamous cell carcinoma) 05/04/2021    left mid forehead    SCC (squamous cell carcinoma) 2023    chest    Seasonal allergies     Shingles 01 05 23    Sleep apnea     has inspire    Squamous cell skin cancer 2007    left cheek      Stroke (HCC)     Swollen ankles     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, left     great toe fusion    Transaminitis 06/03/2024    Urinary incontinence     Urinary tract infection 03/28/2022    Wears glasses    [2]   Past Surgical History:  Procedure Laterality Date    ABDOMINAL SURGERY  1997,2015,1972    Nissen x2 1972 tubal ligarion    ARTHROSCOPY KNEE Right     BREAST BIOPSY      stereotactic-benign    BREAST BIOPSY      stereo-benign    BREAST CYST EXCISION Bilateral     benign- done in [employer]-neg    BREAST EXCISIONAL BIOPSY      unknown date-benign    BREAST EXCISIONAL BIOPSY      unknown date-benign    BREAST EXCISIONAL BIOPSY      unknown date-benign    BREAST EXCISIONAL BIOPSY      unknown age-benign    CARDIAC CATHETERIZATION      CATARACT EXTRACTION Right     CHOLECYSTECTOMY      COLONOSCOPY      EXAMINATION UNDER ANESTHESIA N/A 06/24/2021    Procedure: EXAM UNDER ANESTHESIA (EUA), DISE;  Surgeon: Gregory Maier MD;  [facility]: [facility];  Service: ENT    HERNIA REPAIR      hiatal    HIATAL HERNIA REPAIR      KNEE SURGERY Right     Torn maniscus lap surg    LIPOSUCTION      LYMPH NODE BIOPSY      MOHS SURGERY  05/20/2021    left mid forehead-Mickey    MOHS SURGERY Left 05/27/2021    Left nasal tip- mickey     MN LIGATION/BIOPSY TEMPORAL ARTERY Left 01/05/2023    Procedure: BIOPSY ARTERY TEMPORAL;  Surgeon: Alec Dennis DO;  Location[facility];  Service: Vascular    MN OPEN IMPLANTATION CRANIAL NERVE BOOM & PULSE GEN N/A 11/10/2021    Procedure: INSERTION UPPER AIRWAY STIMULATOR, INSPIRE IMPLANT;  Surgeon: Gregory Maier MD;  Location: [facility];  Service: ENT    MN UNLISTED PROCEDURE FOOT/TOES Right 07/19/2022    Procedure: 1st metatarsal phalangeal joint fusion;  Surgeon: Dede Bonner DPM;  Location: [facility];  Service: Podiatry    REDUCTION MAMMAPLASTY Bilateral 2000    REDUCTION MAMMAPLASTY      SKIN BIOPSY      SKIN CANCER EXCISION  2007    squamous cell carcinoma     SKIN CANCER  EXCISION  2007    basal cell carcinoma    SKIN CANCER EXCISION      SCCIS chest    SQUAMOUS CELL CARCINOMA EXCISION      TOE SURGERY Right 2022    Right Great Toe Fusion    TONSILLECTOMY      TUBAL LIGATION      UPPER GASTROINTESTINAL ENDOSCOPY      US GUIDED BREAST BIOPSY RIGHT COMPLETE Right 2022   [3]   Social History  Tobacco Use    Smoking status: Former     Current packs/day: 0.00     Average packs/day: 2.0 packs/day for 10.0 years (20.0 ttl pk-yrs)     Types: Cigarettes     Start date: 1966     Quit date: 1976     Years since quittin.5     Passive exposure: Past    Smokeless tobacco: Never    Tobacco comments:     Smoked 2 pack a day   Vaping Use    Vaping status: Never Used   Substance and Sexual Activity    Alcohol use: Yes     Comment: 1 or 2 a year    Drug use: No    Sexual activity: Not Currently     Partners: Male     Birth control/protection: Abstinence, Post-menopausal, Female Sterilization     Comment: defer

## 2025-06-29 NOTE — ASSESSMENT & PLAN NOTE
"Lab Results   Component Value Date    HGBA1C 6.3 (H) 03/20/2025       No results for input(s): \"POCGLU\" in the last 72 hours.    Blood Sugar Average: Last 72 hrs:    On lantus 10 units at home nightly, 5 units lispro with lunch and dinner  Will continue this and sliding scale  "

## 2025-06-30 PROBLEM — E83.39 HYPERPHOSPHATEMIA: Status: ACTIVE | Noted: 2025-06-30

## 2025-06-30 PROBLEM — D63.1: Status: ACTIVE | Noted: 2025-06-30

## 2025-06-30 PROBLEM — N18.5: Status: ACTIVE | Noted: 2025-06-30

## 2025-06-30 LAB
ANION GAP SERPL CALCULATED.3IONS-SCNC: 7 MMOL/L (ref 4–13)
ATRIAL RATE: 56 BPM
BASOPHILS # BLD AUTO: 0.03 THOUSANDS/ÂΜL (ref 0–0.1)
BASOPHILS NFR BLD AUTO: 0 % (ref 0–1)
BUN SERPL-MCNC: 61 MG/DL (ref 5–25)
CALCIUM SERPL-MCNC: 8.6 MG/DL (ref 8.4–10.2)
CHLORIDE SERPL-SCNC: 107 MMOL/L (ref 96–108)
CO2 SERPL-SCNC: 24 MMOL/L (ref 21–32)
CREAT SERPL-MCNC: 5.3 MG/DL (ref 0.6–1.3)
EOSINOPHIL # BLD AUTO: 0.38 THOUSAND/ÂΜL (ref 0–0.61)
EOSINOPHIL NFR BLD AUTO: 4 % (ref 0–6)
ERYTHROCYTE [DISTWIDTH] IN BLOOD BY AUTOMATED COUNT: 14 % (ref 11.6–15.1)
GFR SERPL CREATININE-BSD FRML MDRD: 7 ML/MIN/1.73SQ M
GLUCOSE SERPL-MCNC: 109 MG/DL (ref 65–140)
GLUCOSE SERPL-MCNC: 119 MG/DL (ref 65–140)
GLUCOSE SERPL-MCNC: 168 MG/DL (ref 65–140)
GLUCOSE SERPL-MCNC: 60 MG/DL (ref 65–140)
GLUCOSE SERPL-MCNC: 93 MG/DL (ref 65–140)
HCT VFR BLD AUTO: 27.2 % (ref 34.8–46.1)
HGB BLD-MCNC: 9.3 G/DL (ref 11.5–15.4)
IMM GRANULOCYTES # BLD AUTO: 0.03 THOUSAND/UL (ref 0–0.2)
IMM GRANULOCYTES NFR BLD AUTO: 0 % (ref 0–2)
LYMPHOCYTES # BLD AUTO: 2.17 THOUSANDS/ÂΜL (ref 0.6–4.47)
LYMPHOCYTES NFR BLD AUTO: 24 % (ref 14–44)
MAGNESIUM SERPL-MCNC: 1.9 MG/DL (ref 1.9–2.7)
MCH RBC QN AUTO: 30.9 PG (ref 26.8–34.3)
MCHC RBC AUTO-ENTMCNC: 34.2 G/DL (ref 31.4–37.4)
MCV RBC AUTO: 90 FL (ref 82–98)
MONOCYTES # BLD AUTO: 0.55 THOUSAND/ÂΜL (ref 0.17–1.22)
MONOCYTES NFR BLD AUTO: 6 % (ref 4–12)
NEUTROPHILS # BLD AUTO: 5.73 THOUSANDS/ÂΜL (ref 1.85–7.62)
NEUTS SEG NFR BLD AUTO: 66 % (ref 43–75)
NRBC BLD AUTO-RTO: 0 /100 WBCS
P AXIS: 55 DEGREES
PHOSPHATE SERPL-MCNC: 4.4 MG/DL (ref 2.3–4.1)
PLATELET # BLD AUTO: 221 THOUSANDS/UL (ref 149–390)
PMV BLD AUTO: 11.8 FL (ref 8.9–12.7)
POTASSIUM SERPL-SCNC: 3.5 MMOL/L (ref 3.5–5.3)
PR INTERVAL: 256 MS
QRS AXIS: -3 DEGREES
QRSD INTERVAL: 90 MS
QT INTERVAL: 462 MS
QTC INTERVAL: 445 MS
RBC # BLD AUTO: 3.01 MILLION/UL (ref 3.81–5.12)
SODIUM SERPL-SCNC: 138 MMOL/L (ref 135–147)
T WAVE AXIS: 62 DEGREES
VENTRICULAR RATE: 56 BPM
WBC # BLD AUTO: 8.89 THOUSAND/UL (ref 4.31–10.16)

## 2025-06-30 PROCEDURE — 93010 ELECTROCARDIOGRAM REPORT: CPT | Performed by: INTERNAL MEDICINE

## 2025-06-30 PROCEDURE — 83735 ASSAY OF MAGNESIUM: CPT

## 2025-06-30 PROCEDURE — 84100 ASSAY OF PHOSPHORUS: CPT

## 2025-06-30 PROCEDURE — 82948 REAGENT STRIP/BLOOD GLUCOSE: CPT

## 2025-06-30 PROCEDURE — 85025 COMPLETE CBC W/AUTO DIFF WBC: CPT

## 2025-06-30 PROCEDURE — 80048 BASIC METABOLIC PNL TOTAL CA: CPT

## 2025-06-30 PROCEDURE — 99223 1ST HOSP IP/OBS HIGH 75: CPT | Performed by: INTERNAL MEDICINE

## 2025-06-30 PROCEDURE — 99232 SBSQ HOSP IP/OBS MODERATE 35: CPT | Performed by: HOSPITALIST

## 2025-06-30 RX ORDER — LORATADINE 10 MG/1
5 TABLET ORAL ONCE
Status: COMPLETED | OUTPATIENT
Start: 2025-06-30 | End: 2025-06-30

## 2025-06-30 RX ORDER — HYDRALAZINE HYDROCHLORIDE 25 MG/1
75 TABLET, FILM COATED ORAL EVERY 8 HOURS SCHEDULED
Status: DISCONTINUED | OUTPATIENT
Start: 2025-06-30 | End: 2025-06-30

## 2025-06-30 RX ORDER — AMLODIPINE BESYLATE 10 MG/1
10 TABLET ORAL DAILY
Qty: 30 TABLET | Refills: 0 | OUTPATIENT
Start: 2025-06-30

## 2025-06-30 RX ORDER — HYDRALAZINE HYDROCHLORIDE 25 MG/1
75 TABLET, FILM COATED ORAL EVERY 8 HOURS SCHEDULED
Qty: 270 TABLET | Refills: 0 | Status: CANCELLED | OUTPATIENT
Start: 2025-06-30

## 2025-06-30 RX ORDER — HYDRALAZINE HYDROCHLORIDE 25 MG/1
75 TABLET, FILM COATED ORAL EVERY 8 HOURS SCHEDULED
Status: DISCONTINUED | OUTPATIENT
Start: 2025-06-30 | End: 2025-07-01 | Stop reason: HOSPADM

## 2025-06-30 RX ORDER — POLYETHYLENE GLYCOL 3350 17 G/17G
17 POWDER, FOR SOLUTION ORAL ONCE
Status: DISCONTINUED | OUTPATIENT
Start: 2025-06-30 | End: 2025-07-01 | Stop reason: HOSPADM

## 2025-06-30 RX ORDER — AMLODIPINE BESYLATE 10 MG/1
10 TABLET ORAL DAILY
Status: DISCONTINUED | OUTPATIENT
Start: 2025-06-30 | End: 2025-07-01 | Stop reason: HOSPADM

## 2025-06-30 RX ADMIN — PRAVASTATIN SODIUM 40 MG: 40 TABLET ORAL at 17:26

## 2025-06-30 RX ADMIN — Medication 100 MG: at 10:12

## 2025-06-30 RX ADMIN — HYDRALAZINE HYDROCHLORIDE 75 MG: 25 TABLET ORAL at 21:30

## 2025-06-30 RX ADMIN — PANTOPRAZOLE SODIUM 40 MG: 40 TABLET, DELAYED RELEASE ORAL at 17:26

## 2025-06-30 RX ADMIN — LEVOTHYROXINE SODIUM 125 MCG: 0.12 TABLET ORAL at 06:09

## 2025-06-30 RX ADMIN — HEPARIN SODIUM 5000 UNITS: 5000 INJECTION INTRAVENOUS; SUBCUTANEOUS at 21:30

## 2025-06-30 RX ADMIN — Medication 250 MG: at 08:27

## 2025-06-30 RX ADMIN — SUCRALFATE 1 G: 1 TABLET ORAL at 17:26

## 2025-06-30 RX ADMIN — HYDRALAZINE HYDROCHLORIDE 50 MG: 25 TABLET ORAL at 06:09

## 2025-06-30 RX ADMIN — PANTOPRAZOLE SODIUM 40 MG: 40 TABLET, DELAYED RELEASE ORAL at 08:27

## 2025-06-30 RX ADMIN — Medication 1 PACKET: at 08:28

## 2025-06-30 RX ADMIN — Medication 2000 UNITS: at 08:27

## 2025-06-30 RX ADMIN — ALLOPURINOL 50 MG: 100 TABLET ORAL at 08:28

## 2025-06-30 RX ADMIN — ASPIRIN 81 MG: 81 TABLET, DELAYED RELEASE ORAL at 08:28

## 2025-06-30 RX ADMIN — LORATADINE 5 MG: 10 TABLET ORAL at 08:40

## 2025-06-30 RX ADMIN — AMLODIPINE BESYLATE 10 MG: 10 TABLET ORAL at 10:12

## 2025-06-30 RX ADMIN — INSULIN LISPRO 5 UNITS: 100 INJECTION, SOLUTION INTRAVENOUS; SUBCUTANEOUS at 11:22

## 2025-06-30 RX ADMIN — Medication 250 MG: at 17:26

## 2025-06-30 RX ADMIN — HEPARIN SODIUM 5000 UNITS: 5000 INJECTION INTRAVENOUS; SUBCUTANEOUS at 06:09

## 2025-06-30 RX ADMIN — HEPARIN SODIUM 5000 UNITS: 5000 INJECTION INTRAVENOUS; SUBCUTANEOUS at 13:34

## 2025-06-30 RX ADMIN — ACETAMINOPHEN 975 MG: 325 TABLET ORAL at 06:12

## 2025-06-30 RX ADMIN — ESCITALOPRAM OXALATE 10 MG: 10 TABLET ORAL at 08:28

## 2025-06-30 RX ADMIN — CALCIUM ACETATE 667 MG: 667 CAPSULE ORAL at 17:26

## 2025-06-30 RX ADMIN — ACETAMINOPHEN 975 MG: 325 TABLET ORAL at 12:46

## 2025-06-30 RX ADMIN — SUCRALFATE 1 G: 1 TABLET ORAL at 11:23

## 2025-06-30 RX ADMIN — HYDRALAZINE HYDROCHLORIDE 75 MG: 25 TABLET ORAL at 13:34

## 2025-06-30 RX ADMIN — INSULIN GLARGINE 10 UNITS: 100 INJECTION, SOLUTION SUBCUTANEOUS at 21:30

## 2025-06-30 RX ADMIN — SUCRALFATE 1 G: 1 TABLET ORAL at 06:09

## 2025-06-30 RX ADMIN — PRAMIPEXOLE DIHYDROCHLORIDE 0.25 MG: 0.25 TABLET ORAL at 21:30

## 2025-06-30 RX ADMIN — INSULIN LISPRO 1 UNITS: 100 INJECTION, SOLUTION INTRAVENOUS; SUBCUTANEOUS at 11:22

## 2025-06-30 NOTE — ASSESSMENT & PLAN NOTE
Patient complained of  L sided back/lateral pain that radiates from her L back following the rib line to the L upper abdomen. The pain is not relieved with the Lidocaine patch and seems to be worse with movement and palpation.  Some reproducible tenderness as well at the level of the scapula    Plan:  - Continue tylenol for pain for now

## 2025-06-30 NOTE — ASSESSMENT & PLAN NOTE
Lab Results   Component Value Date    HGBA1C 5.8 (H) 06/29/2025       Recent Labs     06/29/25  1739 06/30/25  0706   POCGLU 217* 119       Blood Sugar Average: Last 72 hrs:  (P) 168  -Continue management per primary team.  Currently on insulin

## 2025-06-30 NOTE — ASSESSMENT & PLAN NOTE
Recent Labs     06/29/25  1037 06/30/25  0451   CREATININE 5.50* 5.30*   EGFR 6 7     Estimated Creatinine Clearance: 8.5 mL/min (A) (by C-G formula based on SCr of 5.3 mg/dL (H)).  Baseline in high 4-  low 5s  Patient's recent hypertensive medication change (Amlodipine to Metoprolol) could be contributing to her new onset hypertension and palpitations. Her elevated Cr in ED to 5.5 (similar to baseline for her Stage V CKD) could be in the setting of worsening kidney function progressing to ESRD v.s medication change.  Urine output has been decreasing per patient   Recent titration of hydralazine increased to 75 mg daily a few weeks ago    Plan:  - 50 mg Hydralazine TID increased to 75 mg TID due to elevated pressures this afternoon (has been taking 75mg BID at home)  - Labetalol 20 mg q4 prn   - Administer when SBP > 180 or DBP > 100, hold for bradycardia < 50  -Restarted amlodipine 10 mg daily   - ACEis and ARBs contraindicated in setting of patient's CKD  - Consult nephrology, appreciate recs  - Potential palliative care consult in the future as patient does not want to undergo dialysis  - continue phoslo, vitamin d  - renal liberal diet ordered

## 2025-06-30 NOTE — ASSESSMENT & PLAN NOTE
Lab Results   Component Value Date    HGBA1C 5.8 (H) 06/29/2025       Recent Labs     06/29/25  1739   POCGLU 217*       Blood Sugar Average: Last 72 hrs:  (P) 217  On lantus 10 units at home nightly, 5 units lispro with lunch and dinner  Will continue this and sliding scale

## 2025-06-30 NOTE — DISCHARGE SUMMARY
Discharge Summary - Hospitalist   Name: Trina Davis 75 y.o. female I MRN: [id number]  Unit/Bed#: S -01 I Date of Admission: 6/29/2025   Date of Service: 7/1/2025 I Hospital Day: 2     Assessment & Plan  Renovascular hypertension  Stage 5 chronic kidney disease not on chronic dialysis (HCC)  Recent Labs     06/29/25  1037 06/30/25  0451 07/01/25  0541   CREATININE 5.50* 5.30* 5.34*   EGFR 6 7 7     Estimated Creatinine Clearance: 8.4 mL/min (A) (by C-G formula based on SCr of 5.34 mg/dL (H)).  Baseline in high 4-  low 5s  Patient's recent hypertensive medication change (Amlodipine to Metoprolol) could be contributing to her new onset hypertension and palpitations. Her elevated Cr in ED to 5.5 (similar to baseline for her Stage V CKD) could be in the setting of worsening kidney function progressing to ESRD v.s medication change.  Urine output has been decreasing per patient   Recent titration of hydralazine increased to 75 mg daily a few weeks ago    Plan:  - Increase to 75 mg Hydralazine TID (has been taking 75mg BID at home) given pressures still elevated this afternoon   - Continue amlodipine 10 mg daily  - Consult nephrology, appreciated recommendations  - Stop Lopressor   - Continue phoslo, Vitamin d  - Renal liberal diet ordered  - Recommend that patient check her blood pressure on a daily basis, she has a blood pressure cuff at home and was given instructions on checking and recording her BP accurately   Left lateral abdominal pain  Patient complained of  L sided back/lateral pain that radiates from her L back following the rib line to the L upper abdomen. The pain is not relieved with the Lidocaine patch and seems to be worse with movement and palpation.  Some reproducible tenderness as well at the level of the scapula    Plan:  - Continue tylenol for pain for now  Type 2 diabetes mellitus with kidney complication, with long-term current use of insulin ([facility])  Lab Results   Component Value Date    HGBA1C  5.8 (H) 06/29/2025       Recent Labs     06/30/25  1512 06/30/25  1550 07/01/25  0715 07/01/25  1056   POCGLU 60* 109 120 148*       Blood Sugar Average: Last 72 hrs:  (P) 134.6600190536694477  On lantus 10 units at home nightly, 5 units lispro with lunch and dinner  Will continue this and sliding scale  Hyperphosphatemia  7/1 Phosphorus level elevated at 4.5.    Plan:  - Continue phoslo      Medical Problems       Resolved Problems  Date Reviewed: 6/12/2025   None       Discharging Physician / Practitioner: Omar Armenta MD  PCP: Tree Aguilar MD  Admission Date:   Admission Orders (From admission, onward)       Ordered        06/29/25 1346  INPATIENT ADMISSION  Once                          Discharge Date: 07/01/25    Next Steps for Physician/AP Assuming Care:  Monitoring BP for titration of BP regimen  Repeat BMP within a week     Test Results Pending at Discharge (will require follow up):  None     Medication Changes for Discharge & Rationale:   See after visit summary for reconciled discharge medications provided to patient and/or family.     Consultations During Hospital Stay:  Nephrology     Procedures Performed:   None    Significant Findings / Test Results:   Creatinine 5.5 on arrival, improved to 5.3 on day of discharge  Chest x-ray showing no acute cardiopulmonary disease  EKG showing sinus rhythm with first-degree AV block    Incidental Findings:   None    Hospital Course:   Trina Davis is a 75 y.o. female patient who originally presented to the hospital on 6/29/2025 due to high blood pressure at home.  She was recently discontinued on her amlodipine and started Lopressor due to reflex tachycardia from hydralazine titration.  She endorsed slight palpitations as well prior to coming to the emergency department in addition to poor p.o. intake.  Creatinine was noted to be 5.5 on admission and she was hypertensive 232/102.  Patient was given amlodipine and her hydralazine was increased to 50 mg every  8 hours scheduled and later up-titrated to 75 mg TID.  Her renal function improved on the day of discharge and her electrolytes were stable.  She did have some uremic symptoms of poor appetite but appeared euvolemic and did not have any asterixis.  Patient was continued to have adequate hydration.  She was not interested in dialysis even if needed for the future.  Patient's blood pressure was monitored on the day of discharge.  She should discontinue her Lopressor and remain on the amlodipine and hydralazine.  Patient was encouraged to have close follow-up with PCP and nephrologist.  She should take her blood pressures on a daily basis as well.    The patient, initially admitted to the hospital as inpatient, was discharged earlier than expected given the following: blood pressures were stabilized.  Please see above list of diagnoses and related plan for additional information.     Discharge Day Visit / Exam:   Subjective:  Patient reports feeling well. She ate 1/2 of her breakfast and has been walking around. She endorsed a temperature elevation and feeling cold overnight, some lightheadedness, and L back pain, but otherwise denies palpitations, dizziness, chest pain, SOB, N/V, diarrhea, constipation, or urinary symptoms.   Vitals: Blood Pressure: 154/81 (07/01/25 1128)  Pulse: 73 (07/01/25 1128)  Temperature: 99.4 °F (37.4 °C) (07/01/25 0715)  Temp Source: Oral (06/29/25 1449)  Respirations: 18 (07/01/25 0715)  SpO2: 96 % (07/01/25 1128)  Physical Exam  Constitutional:       General: She is not in acute distress.     Appearance: Normal appearance.     Cardiovascular:      Rate and Rhythm: Normal rate and regular rhythm.   Pulmonary:      Effort: Pulmonary effort is normal.      Breath sounds: Normal breath sounds.   Abdominal:      General: There is no distension.      Palpations: Abdomen is soft.     Neurological:      Mental Status: She is alert.          Discussion with Family: Unable to contact.     Discharge  instructions/Information to patient and family:   See after visit summary for information provided to patient and family.      Provisions for Follow-Up Care:  See after visit summary for information related to follow-up care and any pertinent home health orders.      Mobility at time of Discharge:   Basic Mobility Inpatient Raw Score: 23  JH-HLM Goal: 7: Walk 25 feet or more  JH-HLM Achieved: 6: Walk 10 steps or more  HLM Goal achieved. Continue to encourage appropriate mobility.     Disposition:   Home    Planned Readmission: no    Administrative Statements   Discharge Statement:  I have spent a total time of 30 minutes in caring for this patient on the day of the visit/encounter.    **Please Note: This note may have been constructed using a voice recognition system**

## 2025-06-30 NOTE — PROGRESS NOTES
Progress Note - Hospitalist   Name: Trina Davis 75 y.o. female I MRN: [id number]  Unit/Bed#: S -01 I Date of Admission: 6/29/2025   Date of Service: 6/30/2025 I Hospital Day: 1    Assessment & Plan  Renovascular hypertension  Stage 5 chronic kidney disease not on chronic dialysis (HCC)  Recent Labs     06/29/25  1037 06/30/25  0451   CREATININE 5.50* 5.30*   EGFR 6 7     Estimated Creatinine Clearance: 8.5 mL/min (A) (by C-G formula based on SCr of 5.3 mg/dL (H)).  Baseline in high 4-  low 5s  Patient's recent hypertensive medication change (Amlodipine to Metoprolol) could be contributing to her new onset hypertension and palpitations. Her elevated Cr in ED to 5.5 (similar to baseline for her Stage V CKD) could be in the setting of worsening kidney function progressing to ESRD v.s medication change.  Urine output has been decreasing per patient   Recent titration of hydralazine increased to 75 mg daily a few weeks ago    Plan:  - 50 mg Hydralazine TID increased to 75 mg TID due to elevated pressures this afternoon (has been taking 75mg BID at home)  - Labetalol 20 mg q4 prn   - Administer when SBP > 180 or DBP > 100, hold for bradycardia < 50  -Restarted amlodipine 10 mg daily   - ACEis and ARBs contraindicated in setting of patient's CKD  - Consult nephrology, appreciate recs  - Potential palliative care consult in the future as patient does not want to undergo dialysis  - continue phoslo, vitamin d  - renal liberal diet ordered  Left lateral abdominal pain  Patient complained of  L sided back/lateral pain that radiates from her L back following the rib line to the L upper abdomen. The pain is not relieved with the Lidocaine patch and seems to be worse with movement and palpation.  Some reproducible tenderness as well at the level of the scapula    Plan:  - Continue tylenol for pain for now, can add muscle relaxer if needed  Type 2 diabetes mellitus with kidney complication, with long-term current use of  insulin (HCC)  Lab Results   Component Value Date    HGBA1C 5.8 (H) 06/29/2025       Recent Labs     06/29/25  1739   POCGLU 217*       Blood Sugar Average: Last 72 hrs:  (P) 217  On lantus 10 units at home nightly, 5 units lispro with lunch and dinner  Will continue this and sliding scale    VTE Pharmacologic Prophylaxis: VTE Score: 4 Moderate Risk (Score 3-4) - Pharmacological DVT Prophylaxis Ordered: heparin.    Mobility:   Basic Mobility Inpatient Raw Score: 22  JH-HLM Goal: 7: Walk 25 feet or more  JH-HLM Achieved: 7: Walk 25 feet or more  JH-HLM Goal achieved. Continue to encourage appropriate mobility.    Patient Centered Rounds: I performed bedside rounds with nursing staff today.   Education and Discussions with Family / Patient: Updated  () at bedside.    Current Length of Stay: 1 day(s)  Current Patient Status: Inpatient   Certification Statement: The patient will continue to require additional inpatient hospital stay due to monitoring blood pressures, nephrology consults, following up echocardiogram  Discharge Plan: Anticipate discharge in 24-48 hrs to home.    Code Status: Level 3 - DNAR and DNI    Subjective   Patient reported this is a day.  She had a headache previously that was relieved with Tylenol.  She denies any chest pain, shortness of breath, nausea, vomiting.  She reports that she would like to go home today.  She does not want any dialysis.  She is tolerating p.o. intake well.    Objective :  Temp:  [98.3 °F (36.8 °C)-98.7 °F (37.1 °C)] 98.4 °F (36.9 °C)  HR:  [52-66] 66  BP: (145-234)/() 160/73  Resp:  [16-18] 18  SpO2:  [91 %-99 %] 94 %  O2 Device: None (Room air)    There is no height or weight on file to calculate BMI.     Input and Output Summary (last 24 hours):     Intake/Output Summary (Last 24 hours) at 6/30/2025 0625  Last data filed at 6/30/2025 0500  Gross per 24 hour   Intake 120 ml   Output --   Net 120 ml       Physical Exam  Vitals reviewed.    Constitutional:       Appearance: She is not diaphoretic.   HENT:      Head: Normocephalic and atraumatic.      Mouth/Throat:      Mouth: Mucous membranes are moist.     Eyes:      Conjunctiva/sclera: Conjunctivae normal.       Cardiovascular:      Rate and Rhythm: Normal rate and regular rhythm.   Pulmonary:      Effort: Pulmonary effort is normal. No respiratory distress.   Abdominal:      Palpations: Abdomen is soft.      Tenderness: There is no abdominal tenderness. There is no guarding.     Musculoskeletal:      Right lower leg: No edema.      Left lower leg: No edema.     Skin:     General: Skin is warm and dry.     Neurological:      Mental Status: She is alert. Mental status is at baseline.     Psychiatric:         Mood and Affect: Mood normal.         Behavior: Behavior normal.                     Lab Results: I have reviewed the following results:   Results from last 7 days   Lab Units 06/30/25  0451   WBC Thousand/uL 8.89   HEMOGLOBIN g/dL 9.3*   HEMATOCRIT % 27.2*   PLATELETS Thousands/uL 221   SEGS PCT % 66   LYMPHO PCT % 24   MONO PCT % 6   EOS PCT % 4     Results from last 7 days   Lab Units 06/30/25  0451 06/29/25  1037   SODIUM mmol/L 138 139   POTASSIUM mmol/L 3.5 3.9   CHLORIDE mmol/L 107 106   CO2 mmol/L 24 23   BUN mg/dL 61* 60*   CREATININE mg/dL 5.30* 5.50*   ANION GAP mmol/L 7 10   CALCIUM mg/dL 8.6 9.1   ALBUMIN g/dL  --  3.9   TOTAL BILIRUBIN mg/dL  --  0.31   ALK PHOS U/L  --  68   ALT U/L  --  51   AST U/L  --  23   GLUCOSE RANDOM mg/dL 93 139         Results from last 7 days   Lab Units 06/29/25  1739   POC GLUCOSE mg/dl 217*     Results from last 7 days   Lab Units 06/29/25  1037   HEMOGLOBIN A1C % 5.8*           Recent Cultures (last 7 days):               Last 24 Hours Medication List:     Current Facility-Administered Medications:     acetaminophen (TYLENOL) tablet 975 mg, Q6H PRN    albuterol (PROVENTIL HFA,VENTOLIN HFA) inhaler 2 puff, Q6H PRN    allopurinol (ZYLOPRIM) tablet 50  mg, Once per day on Monday Thursday    aspirin (ECOTRIN LOW STRENGTH) EC tablet 81 mg, Daily    calcium acetate (PHOSLO) capsule 667 mg, After Dinner    Cholecalciferol (VITAMIN D3) tablet 2,000 Units, Daily    CoQ10 CAPS 100 mg, Daily    cyanocobalamin (VITAMIN B-12) tablet 1,000 mcg, Weekly    docusate sodium (COLACE) capsule 100 mg, Daily PRN    escitalopram (LEXAPRO) tablet 10 mg, Daily    gabapentin (NEURONTIN) capsule 100 mg, Daily PRN    heparin (porcine) subcutaneous injection 5,000 Units, Q8H ELISHA    hydrALAZINE (APRESOLINE) tablet 50 mg, Q8H ELISHA    insulin glargine (LANTUS) subcutaneous injection 10 Units 0.1 mL, HS    insulin lispro (HumALOG/ADMELOG) 100 units/mL subcutaneous injection 1-5 Units, TID AC **AND** Fingerstick Glucose (POCT), TID AC    insulin lispro (HumALOG/ADMELOG) 100 units/mL subcutaneous injection 5 Units, BID before lunch/dinner    labetalol (NORMODYNE) injection 20 mg, Q4H PRN    levothyroxine tablet 125 mcg, Early Morning    loratadine (CLARITIN) tablet 10 mg, Q48H    ondansetron (ZOFRAN) injection 4 mg, Q6H PRN    pantoprazole (PROTONIX) EC tablet 40 mg, BID    pramipexole (MIRAPEX) tablet 0.25 mg, HS    pravastatin (PRAVACHOL) tablet 40 mg, Daily With Dinner    psyllium (METAMUCIL) 1 packet, Daily    saccharomyces boulardii (FLORASTOR) capsule 250 mg, BID    sucralfate (CARAFATE) tablet 1 g, TID AC    Administrative Statements   Today, Patient Was Seen By: Lynsey Weinstein MD      **Please Note: This note may have been constructed using a voice recognition system.**

## 2025-06-30 NOTE — ASSESSMENT & PLAN NOTE
- Blood pressure was elevated admission as patient was recently taken off amlodipine 10 mg and placed on metoprolol as outpatient by PCP.  Was continued on hydralazine but dose was increased to 75 mg twice daily as outpatient  -Blood pressure is still elevated but better status post amlodipine 10 mg given yesterday.  Will start patient back on amlodipine 10 mg daily.  Agree with hydralazine 50 mg 3 times daily which is higher than twice daily dose at home.  Continue blood pressure monitoring and if blood pressure is acceptable at 140-150 systolic today, okay for discharge from nephrology side.  Would not start on metoprolol as patient had episodes of bradycardia.  If blood pressures remain elevated, can consider higher dose of hydralazine 75 mg 3 times daily.  Recommend home monitoring of blood pressure

## 2025-06-30 NOTE — CONSULTS
Consultation - Nephrology   Name: Trina Davis 75 y.o. female I MRN: [id number]  Unit/Bed#: S -01 I Date of Admission: 6/29/2025   Date of Service: 6/30/2025 I Hospital Day: 1   Inpatient consult to Nephrology  Consult performed by: Tyler Gallardo MD  Consult ordered by: Holly Alvarado DO        Physician Requesting Evaluation: Liborio Julio MD   Reason for Evaluation / Principal Problem:  CKD stage 5    Assessment & Plan  Stage 5 chronic kidney disease not on chronic dialysis (HCC)  Lab Results   Component Value Date    EGFR 7 06/30/2025    EGFR 6 06/29/2025    EGFR 7 06/26/2025    CREATININE 5.30 (H) 06/30/2025    CREATININE 5.50 (H) 06/29/2025    CREATININE 5.15 (H) 06/26/2025   Follows with Dr. Moi Baires  Chronic kidney disease due to diabetic nephropathy, hypertensive nephrosclerosis, arteriolar nephrosclerosis, chronic NSAIDs use.  Prior SPEP and UPEP was negative.  Previously had negative ELOY  Baseline creatinine has been around 4.7 to 5.2 mg/dL since June 2025 with CKD progression.  Previously in April creatinine was around 4.0 mg/dL  Admission creatinine on 6/29 was 5.50 mg/dL  Renal function today improved to creatinine 5.30 mg/dL, electrolytes stable.  BUN 61  She does have mild uremic symptoms with complaint of poor appetite, clinically appears euvolemic, continue oral hydration, patient is not interested in dialysis even if needed in future.  Renovascular hypertension  - Blood pressure was elevated admission as patient was recently taken off amlodipine 10 mg and placed on metoprolol as outpatient by PCP.  Was continued on hydralazine but dose was increased to 75 mg twice daily as outpatient  -Blood pressure is still elevated but better status post amlodipine 10 mg given yesterday.  Will start patient back on amlodipine 10 mg daily.  Agree with hydralazine 50 mg 3 times daily which is higher than twice daily dose at home.  Continue blood pressure monitoring and if blood pressure is acceptable  at 140-150 systolic today, okay for discharge from nephrology side.  Would not start on metoprolol as patient had episodes of bradycardia.  If blood pressures remain elevated, can consider higher dose of hydralazine 75 mg 3 times daily.  Recommend home monitoring of blood pressure  Anemia due to stage 5 chronic kidney disease  (HCC)  -Hemoglobin was 9.3 g/dL continue to monitor   Type 2 diabetes mellitus with kidney complication, with long-term current use of insulin (HCC)  Lab Results   Component Value Date    HGBA1C 5.8 (H) 06/29/2025       Recent Labs     06/29/25  1739 06/30/25  0706   POCGLU 217* 119       Blood Sugar Average: Last 72 hrs:  (P) 168  -Continue management per primary team.  Currently on insulin  Hyperphosphatemia  -Last phosphorus level was 4.4 meq/L, recommend low phosphorus diet, continue PhosLo  Left lateral abdominal pain  - Chest x-ray was personally reviewed by me, finding of prominent bronchovascular markings.  Currently asymptomatic, continue workup per primary team, denies any urinary symptoms      I have reviewed the nephrology recommendations including renal function slightly improving but overall has some CKD progression, blood pressure is slightly better, started on amlodipine 10 mg daily, continue current dose of hydralazine and okay for discharge if blood pressure remains stable today morning, with primary team, and we are in agreement with renal plan including the information outlined above.     History of Present Illness   Trina Davis is a 75 y.o. female with history of chronic kidney disease stage V not on dialysis, hypertension, anemia, hyperlipidemia, COPD who was admitted to [facility] after presenting with complaint of palpitations and elevated blood pressure at home.. A renal consultation is requested today for assistance in the management of chronic kidney disease stage V.  Patient was told by her PCP to stop amlodipine last week and to start metoprolol due  to tachycardia.  She made the switch last week on Thursday but on Friday and Saturday started to having palpitations and lightheaded and came to the ED.  Also complaining of poor appetite and chronic fatigue.  Complains of constipation.  Found to have elevated creatinine on admission at 5.5, improved to 5.3 mg/dL today.  Patient also complained of abdominal pain and left-sided back pain    Review of Systems   Constitutional:  Negative for activity change, appetite change, chills, diaphoresis, fatigue and fever.   HENT:  Negative for congestion, facial swelling and nosebleeds.    Eyes:  Negative for pain and visual disturbance.   Respiratory:  Negative for cough, chest tightness and shortness of breath.    Cardiovascular:  Negative for chest pain and palpitations.   Gastrointestinal:  Positive for nausea. Negative for abdominal distention, abdominal pain, diarrhea and vomiting.   Genitourinary:  Negative for difficulty urinating, dysuria, flank pain, frequency and hematuria.   Musculoskeletal:  Negative for arthralgias, back pain and joint swelling.   Skin:  Negative for rash.   Neurological:  Positive for dizziness. Negative for seizures, syncope, weakness and headaches.   Psychiatric/Behavioral:  Negative for agitation and confusion. The patient is not nervous/anxious.      Historical Information   Past Medical History[1]  Past Surgical History[2]  Social History[3]  E-Cigarette/Vaping    E-Cigarette Use Never User      E-Cigarette/Vaping Substances    Nicotine No     THC No     CBD No     Flavoring No     Other No     Unknown No          Objective :  Temp:  [98.3 °F (36.8 °C)-98.7 °F (37.1 °C)] 98.7 °F (37.1 °C)  HR:  [52-66] 62  BP: (145-234)/() 154/75  Resp:  [16-19] 19  SpO2:  [91 %-99 %] 94 %  O2 Device: None (Room air)    Current Weight:    First Weight:    I/O         06/28 0701 06/29 0700 06/29 0701 06/30 0700 06/30 0701 07/01 0700    P.O.  120     Total Intake  120     Net  +120                   Physical Exam  Constitutional:       General: She is not in acute distress.     Appearance: Normal appearance. She is well-developed.   HENT:      Head: Normocephalic and atraumatic.      Nose: Nose normal.      Mouth/Throat:      Mouth: Mucous membranes are moist.     Eyes:      Conjunctiva/sclera: Conjunctivae normal.      Pupils: Pupils are equal, round, and reactive to light.     Neck:      Thyroid: No thyromegaly.      Vascular: No JVD.     Cardiovascular:      Rate and Rhythm: Normal rate and regular rhythm.      Heart sounds: Normal heart sounds. No murmur heard.     No friction rub.   Pulmonary:      Effort: Pulmonary effort is normal.      Breath sounds: Normal breath sounds. No wheezing or rales.   Abdominal:      General: Abdomen is flat. There is no distension.      Tenderness: There is no abdominal tenderness. There is no right CVA tenderness or left CVA tenderness.     Musculoskeletal:         General: No deformity.      Cervical back: Neck supple.      Right lower leg: No edema.      Left lower leg: No edema.     Skin:     General: Skin is warm and dry.      Coloration: Skin is not jaundiced.     Neurological:      Mental Status: She is alert and oriented to person, place, and time.     Psychiatric:         Mood and Affect: Mood normal.         Behavior: Behavior normal.         Thought Content: Thought content normal.          Medications:  Current Medications[4]      Lab Results: I have reviewed the following results:  Results from last 7 days   Lab Units 06/30/25  0451 06/29/25  1037 06/26/25  0637   WBC Thousand/uL 8.89 9.25 7.83   HEMOGLOBIN g/dL 9.3* 10.6* 9.7*   HEMATOCRIT % 27.2* 30.5* 30.2*   PLATELETS Thousands/uL 221 245 221   POTASSIUM mmol/L 3.5 3.9 3.6   CHLORIDE mmol/L 107 106 108   CO2 mmol/L 24 23 24   BUN mg/dL 61* 60* 64*   CREATININE mg/dL 5.30* 5.50* 5.15*   CALCIUM mg/dL 8.6 9.1 8.7   MAGNESIUM mg/dL 1.9  --   --    PHOSPHORUS mg/dL 4.4*  --   --    ALBUMIN g/dL  --  3.9  --   "      Administrative Statements     Portions of the record may have been created with voice recognition software. Occasional wrong word or \"sound a like\" substitutions may have occurred due to the inherent limitations of voice recognition software. Read the chart carefully and recognize, using context, where substitutions have occurred.If you have any questions, please contact the dictating provider.         [1]   Past Medical History:  Diagnosis Date    Actinic keratosis     Acute blood loss anemia 09/24/2024    Acute cystitis without hematuria 04/28/2023    Acute cystitis without hematuria 04/28/2023    Acute respiratory failure with hypoxia (HCC) 02/15/2024    Acute-on-chronic kidney injury  (HCC)     NIKOS (acute kidney injury) (HCC) 05/08/2020    Allergic     Allergic rhinitis     Ambulatory dysfunction 10/22/2020    AMS (altered mental status) 07/14/2020    Anesthesia     pt reports \"had to use double lumen endo tube for hiatal hernia repair/so surgeon could get to where he needed to work\"    Arthritis     Aspiration into airway     At high risk for falls 07/17/2024    Michael esophagus 1993    Lot of stress with children    Basal cell carcinoma 2007    left cheek     BCC (basal cell carcinoma) 05/27/2021    Left Nasal tip    Breast discharge 06/19/2023    Breast pain 06/19/2023    Cancer (HCC)     squamous cell cancer on forhead    Cancer (HCC)     basal cell on nose     Cholelithiasis     Chronic kidney disease 2000, 2018    Stones, kidney disease stage 4    Chronic pain disorder     bilat feet and joint pain on occas    Colon polyp     Confusion 10/30/2023    Constipation     COPD (chronic obstructive pulmonary disease) (HCC)     COVID-19 08/2021    recovered at home/did receive monoclonal infusion    COVID-19 virus infection 08/16/2021    Dental bridge present     Depression     Diabetes mellitus (HCC)     Type 2    Diabetic neuropathy (HCC)     Difficulty walking 2017    Disease of thyroid gland     Dyspnea "     Elevated liver enzymes 04/04/2023    Epigastric abdominal pain with severe diabetic gastroparesis, status post EGD/Botox injection, 5/14 05/17/2021    Facial abrasion, initial encounter 03/22/2023    Fall 03/22/2023    Family history of thyroid problem     Fatty liver     Fibromyalgia, primary     Fracture of orbital floor, blow-out, right, closed, with routine healing, subsequent encounter 03/22/2023    Fracture of orbital floor, right side, initial encounter for closed fracture (HCC) 03/22/2023    GERD (gastroesophageal reflux disease)     Gout     Heart burn     Hiatal hernia     History of cerebrovascular accident (CVA) due to ischemia 10/31/2023    10/23: MRI with foci of late acute infarct involving the right putamen       History of cholecystectomy 10/27/2023    History of colon polyps 07/30/2021    History of kidney stones 09/29/2020    Hx of recurrent kidney stones    History of kidney stones 09/29/2020    Hx of recurrent kidney stones  Formatting of this note might be different from the original. Hx of recurrent kidney stones  Last Assessment & Plan:  Formatting of this note might be different from the original. We will set her up for follow-up in 3 months with a KUB prior.  She knows to call if she has any kidney stone type pain in the meantime.    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 01/06/2023    Kidney problem     Kidney stone     Left hip pain 10/05/2023    Leukocytosis 07/14/2020    Memory loss Julu2 2020    Motion sickness     Motion sickness     Multiple closed fractures of ribs of right side 03/22/2023    Nasal bones, closed fracture 03/22/2023    Neck pain      Obesity 1978    Obesity (BMI 30.0-34.9)     Olecranon bursitis of right elbow 07/29/2024    Other chest pain 09/13/2021    Other fatigue 09/21/2023    Palpitations 07/18/2023    Peripheral neuropathy     Pollen allergies     Postural dizziness with presyncope 08/16/2023    Preoperative clearance 06/27/2019    Reactive depression 07/17/2024    RLS (restless legs syndrome)     S/P foot surgery 07/20/2022    SCC (squamous cell carcinoma) 05/04/2021    left mid forehead    SCC (squamous cell carcinoma) 2023    chest    Seasonal allergies     Shingles 01 05 23    Sleep apnea     has inspire    Squamous cell skin cancer 2007    left cheek     Stroke (HCC)     Swollen ankles     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, left     great toe fusion    Transaminitis 06/03/2024    Urinary incontinence     Urinary tract infection 03/28/2022    Wears glasses    [2]   Past Surgical History:  Procedure Laterality Date    ABDOMINAL SURGERY  1997,2015,1972    Nissen x2 1972 tubal ligarion    ARTHROSCOPY KNEE Right     BREAST BIOPSY      stereotactic-benign    BREAST BIOPSY      stereo-benign    BREAST CYST EXCISION Bilateral     benign- done in [place]-neg    BREAST EXCISIONAL BIOPSY      unknown date-benign    BREAST EXCISIONAL BIOPSY      unknown date-benign    BREAST EXCISIONAL BIOPSY      unknown date-benign    BREAST EXCISIONAL BIOPSY      unknown age-benign    CARDIAC CATHETERIZATION      CATARACT EXTRACTION Right     CHOLECYSTECTOMY      COLONOSCOPY      EXAMINATION UNDER ANESTHESIA N/A 06/24/2021    Procedure: EXAM UNDER ANESTHESIA (EUA), DISE;  Surgeon: Gregory Maier MD;  Location: [facility];  Service: ENT    HERNIA REPAIR      hiatal    HIATAL HERNIA REPAIR      KNEE SURGERY Right     Torn maniscus lap surg    LIPOSUCTION      LYMPH NODE BIOPSY      MOHS SURGERY  05/20/2021    left mid forehead-Morgan    MOHS SURGERY Left 05/27/2021    Left nasal tip- morgan     CT LIGATION/BIOPSY TEMPORAL ARTERY Left 01/05/2023     Procedure: BIOPSY ARTERY TEMPORAL;  Surgeon: Alec Dennis DO;  Location[facility];  Service: Vascular    DC OPEN IMPLANTATION CRANIAL NERVE BOOM & PULSE GEN N/A 11/10/2021    Procedure: INSERTION UPPER AIRWAY STIMULATOR, INSPIRE IMPLANT;  Surgeon: Gregory Maier MD;  Location[facility];  Service: ENT    DC UNLISTED PROCEDURE FOOT/TOES Right 2022    Procedure: 1st metatarsal phalangeal joint fusion;  Surgeon: Dede Bonner DPM;  Location[facility];  Service: Podiatry    REDUCTION MAMMAPLASTY Bilateral 2000    REDUCTION MAMMAPLASTY      SKIN BIOPSY      SKIN CANCER EXCISION  2007    squamous cell carcinoma     SKIN CANCER EXCISION  2007    basal cell carcinoma    SKIN CANCER EXCISION      SCCIS chest    SQUAMOUS CELL CARCINOMA EXCISION      TOE SURGERY Right 2022    Right Great Toe Fusion    TONSILLECTOMY      TUBAL LIGATION      UPPER GASTROINTESTINAL ENDOSCOPY      US GUIDED BREAST BIOPSY RIGHT COMPLETE Right 2022   [3]   Social History  Tobacco Use    Smoking status: Former     Current packs/day: 0.00     Average packs/day: 2.0 packs/day for 10.0 years (20.0 ttl pk-yrs)     Types: Cigarettes     Start date: 1966     Quit date: 1976     Years since quittin.5     Passive exposure: Past    Smokeless tobacco: Never    Tobacco comments:     Smoked 2 pack a day   Vaping Use    Vaping status: Never Used   Substance and Sexual Activity    Alcohol use: Yes     Comment: 1 or 2 a year    Drug use: No    Sexual activity: Not Currently     Partners: Male     Birth control/protection: Abstinence, Post-menopausal, Female Sterilization     Comment: defer   [4]   Current Facility-Administered Medications:     acetaminophen (TYLENOL) tablet 975 mg, 975 mg, Oral, Q6H PRN, Holly Balasundram, DO, 975 mg at 25 0612    albuterol (PROVENTIL HFA,VENTOLIN HFA) inhaler 2 puff, 2 puff, Inhalation, Q6H PRN, Holly Balasundram, DO    allopurinol (ZYLOPRIM) tablet 50 mg, 50 mg, Oral, Once  per day on Monday Thursday, Holly Balasundram, DO, 50 mg at 06/30/25 0828    aspirin (ECOTRIN LOW STRENGTH) EC tablet 81 mg, 81 mg, Oral, Daily, Holly Balasundram, DO, 81 mg at 06/30/25 0828    calcium acetate (PHOSLO) capsule 667 mg, 667 mg, Oral, After Dinner, Holly Balasundram, DO, 667 mg at 06/29/25 1724    Cholecalciferol (VITAMIN D3) tablet 2,000 Units, 2,000 Units, Oral, Daily, Holly Balasundram, DO, 2,000 Units at 06/30/25 0827    CoQ10 CAPS 100 mg, 100 mg, Oral, Daily, Holly Balasundram, DO, 100 mg at 06/29/25 1726    cyanocobalamin (VITAMIN B-12) tablet 1,000 mcg, 1,000 mcg, Oral, Weekly, Holly Balasundram, DO, 1,000 mcg at 06/29/25 1726    docusate sodium (COLACE) capsule 100 mg, 100 mg, Oral, Daily PRN, Holly Balasundram, DO    escitalopram (LEXAPRO) tablet 10 mg, 10 mg, Oral, Daily, Holly Balasundram, DO, 10 mg at 06/30/25 0828    gabapentin (NEURONTIN) capsule 100 mg, 100 mg, Oral, Daily PRN, Holly Balasundram, DO    heparin (porcine) subcutaneous injection 5,000 Units, 5,000 Units, Subcutaneous, Q8H ELISHA, Holly Balasundram, DO, 5,000 Units at 06/30/25 0609    hydrALAZINE (APRESOLINE) tablet 50 mg, 50 mg, Oral, Q8H ELISHA, Holly Balasundram, DO, 50 mg at 06/30/25 0609    insulin glargine (LANTUS) subcutaneous injection 10 Units 0.1 mL, 10 Units, Subcutaneous, HS, Holly Balasundram, DO, 10 Units at 06/29/25 2120    insulin lispro (HumALOG/ADMELOG) 100 units/mL subcutaneous injection 1-5 Units, 1-5 Units, Subcutaneous, TID AC, 2 Units at 06/29/25 1755 **AND** Fingerstick Glucose (POCT), , , TID AC, Holly Balasundram, DO    insulin lispro (HumALOG/ADMELOG) 100 units/mL subcutaneous injection 5 Units, 5 Units, Subcutaneous, BID before lunch/dinner, Holly Balasundram, DO, 5 Units at 06/29/25 1756    labetalol (NORMODYNE) injection 20 mg, 20 mg, Intravenous, Q4H PRN, Holly Balasundram, DO    levothyroxine tablet 125 mcg, 125 mcg, Oral, Early Morning, Holly Balasundram, DO, 125 mcg at 06/30/25  0609    loratadine (CLARITIN) tablet 10 mg, 10 mg, Oral, Q48H, Holly Balasundram, DO, 10 mg at 06/29/25 1724    ondansetron (ZOFRAN) injection 4 mg, 4 mg, Intravenous, Q6H PRN, Holly Balasundram, DO    pantoprazole (PROTONIX) EC tablet 40 mg, 40 mg, Oral, BID, Holly Balasundram, DO, 40 mg at 06/30/25 0827    pramipexole (MIRAPEX) tablet 0.25 mg, 0.25 mg, Oral, HS, Holly Balasundram, DO, 0.25 mg at 06/29/25 2120    pravastatin (PRAVACHOL) tablet 40 mg, 40 mg, Oral, Daily With Dinner, Holly Balasundram, DO, 40 mg at 06/29/25 1724    psyllium (METAMUCIL) 1 packet, 1 packet, Oral, Daily, Holly Balasundram, DO, 1 packet at 06/30/25 0828    saccharomyces boulardii (FLORASTOR) capsule 250 mg, 250 mg, Oral, BID, Holly Balasundram, DO, 250 mg at 06/30/25 0827    sucralfate (CARAFATE) tablet 1 g, 1 g, Oral, TID AC, Holly Balasundram, DO, 1 g at 06/30/25 0609

## 2025-06-30 NOTE — ASSESSMENT & PLAN NOTE
Lab Results   Component Value Date    EGFR 7 06/30/2025    EGFR 6 06/29/2025    EGFR 7 06/26/2025    CREATININE 5.30 (H) 06/30/2025    CREATININE 5.50 (H) 06/29/2025    CREATININE 5.15 (H) 06/26/2025   Follows with Dr. Moi Baires  Chronic kidney disease due to diabetic nephropathy, hypertensive nephrosclerosis, arteriolar nephrosclerosis, chronic NSAIDs use.  Prior SPEP and UPEP was negative.  Previously had negative ELOY  Baseline creatinine has been around 4.7 to 5.2 mg/dL since June 2025 with CKD progression.  Previously in April creatinine was around 4.0 mg/dL  Admission creatinine on 6/29 was 5.50 mg/dL  Renal function today improved to creatinine 5.30 mg/dL, electrolytes stable.  BUN 61  She does have mild uremic symptoms with complaint of poor appetite, clinically appears euvolemic, continue oral hydration, patient is not interested in dialysis even if needed in future.

## 2025-06-30 NOTE — ASSESSMENT & PLAN NOTE
Recent Labs     06/29/25  1037 06/30/25  0451 07/01/25  0541   CREATININE 5.50* 5.30* 5.34*   EGFR 6 7 7     Estimated Creatinine Clearance: 8.4 mL/min (A) (by C-G formula based on SCr of 5.34 mg/dL (H)).  Baseline in high 4-  low 5s  Patient's recent hypertensive medication change (Amlodipine to Metoprolol) could be contributing to her new onset hypertension and palpitations. Her elevated Cr in ED to 5.5 (similar to baseline for her Stage V CKD) could be in the setting of worsening kidney function progressing to ESRD v.s medication change.  Urine output has been decreasing per patient   Recent titration of hydralazine increased to 75 mg daily a few weeks ago    Plan:  - Increase to 75 mg Hydralazine TID (has been taking 75mg BID at home) given pressures still elevated this afternoon   - Continue amlodipine 10 mg daily  - Consult nephrology, appreciated recommendations  - Stop Lopressor   - Continue phoslo, Vitamin d  - Renal liberal diet ordered  - Recommend that patient check her blood pressure on a daily basis, she has a blood pressure cuff at home and was given instructions on checking and recording her BP accurately

## 2025-06-30 NOTE — HOSPITAL COURSE
Trina Davis is a 75 year old female with PMH Stage V CKD, COPD, and HTN who presented to the ED with hypertension and palpitations.  Last week, she was advised by her PCP to discontinue her Amlodipine and start Metoprolol due to reflex tachycardia presumably from her hydralazine. On Thursday, she made this switch and on Friday/Saturday she began to have palpitations, could feel her pulses in her ears, and felt lightheaded. She endorsed decreased PO intake due to having little appetite from her CKD. She also admitted to chronic fatigue, lightheadedness when sitting up, and a mild headache. She denied any fever, chills, N/V, vision changes, dysuria or chest pain. Patient declined dialysis and nephrology was consulted and recommended restarting her Amlodipine 10 mg daily in addition to Hydralazine 50 mg TID (Labetalol as needed for SBP > 180 or DBP > 100).      She also endorsed some L sided back/lateral pain that radiates from her L back following the rib line to the L middle abdomen. This pain was relieved with Tylenol.     She also endorsed intermittent diarrhea and constipation that she has had for the past couple of months. She manages this at home with Colace and a fiber gummy.

## 2025-06-30 NOTE — ASSESSMENT & PLAN NOTE
Lab Results   Component Value Date    HGBA1C 5.8 (H) 06/29/2025       Recent Labs     06/30/25  1512 06/30/25  1550 07/01/25  0715 07/01/25  1056   POCGLU 60* 109 120 148*       Blood Sugar Average: Last 72 hrs:  (P) [id number]  On lantus 10 units at home nightly, 5 units lispro with lunch and dinner  Will continue this and sliding scale

## 2025-06-30 NOTE — DISCHARGE INSTR - AVS FIRST PAGE
Dear Trina Davis,     It was our pleasure to care for you here at [facility].  It is our hope that we were always able to exceed the expected standards for your care during your stay.  You were hospitalized due to high blood pressure.  You were cared for on the forth  floor by Rika Souza MD under the service of Laurita Conrad MD with the [facility] who covers for your primary care physician (PCP), Tree Aguilar MD, while you were hospitalized.  If you have any questions or concerns related to this hospitalization, you may contact us at .  For follow up as well as any medication refills, we recommend that you follow up with your primary care physician.  A registered nurse will reach out to you by phone within a few days after your discharge to answer any additional questions that you may have after going home.  However, at this time we provide for you here, the most important instructions / recommendations at discharge:     Notable Medication Adjustments -   STOP taking metoprolol.  START taking hydralazine 75 mg one tablet 3 times daily, Starting today.  START taking amlodipine 10 mg 1 tablet once daily starting tomorrow.  Testing Required after Discharge -   Basic Metabolic Panel within one week   ** Please contact your PCP to request testing orders for any of the testing recommended here **  Important follow up information -   Please follow-up with your primary care provider within 1 week of discharge.  Other Instructions -   Please make sure you take your blood pressure twice daily. If blood pressure is lower than 130/70 please give a call to the nephrology office. Document your blood pressure readings for your next office with Nephrology.   Please review this entire after visit summary as additional general instructions including medication list, appointments, activity, diet, any pertinent wound care, and other additional recommendations  from your care team that may be provided for you.      Sincerely,     Rika Souza MD

## 2025-06-30 NOTE — ASSESSMENT & PLAN NOTE
Patient complained of  L sided back/lateral pain that radiates from her L back following the rib line to the L upper abdomen. The pain is not relieved with the Lidocaine patch and seems to be worse with movement and palpation.  Some reproducible tenderness as well at the level of the scapula    Plan:  - Continue tylenol for pain for now, can add muscle relaxer if needed

## 2025-06-30 NOTE — ASSESSMENT & PLAN NOTE
- Chest x-ray was personally reviewed by me, finding of prominent bronchovascular markings.  Currently asymptomatic, continue workup per primary team, denies any urinary symptoms

## 2025-07-01 ENCOUNTER — TRANSITIONAL CARE MANAGEMENT (OUTPATIENT)
Dept: INTERNAL MEDICINE CLINIC | Facility: CLINIC | Age: 75
End: 2025-07-01

## 2025-07-01 VITALS
DIASTOLIC BLOOD PRESSURE: 81 MMHG | SYSTOLIC BLOOD PRESSURE: 154 MMHG | TEMPERATURE: 99.4 F | OXYGEN SATURATION: 96 % | HEART RATE: 73 BPM | RESPIRATION RATE: 18 BRPM

## 2025-07-01 LAB
ANION GAP SERPL CALCULATED.3IONS-SCNC: 10 MMOL/L (ref 4–13)
BUN SERPL-MCNC: 55 MG/DL (ref 5–25)
CALCIUM SERPL-MCNC: 8.8 MG/DL (ref 8.4–10.2)
CHLORIDE SERPL-SCNC: 107 MMOL/L (ref 96–108)
CO2 SERPL-SCNC: 22 MMOL/L (ref 21–32)
CREAT SERPL-MCNC: 5.34 MG/DL (ref 0.6–1.3)
ERYTHROCYTE [DISTWIDTH] IN BLOOD BY AUTOMATED COUNT: 13.8 % (ref 11.6–15.1)
GFR SERPL CREATININE-BSD FRML MDRD: 7 ML/MIN/1.73SQ M
GLUCOSE SERPL-MCNC: 110 MG/DL (ref 65–140)
GLUCOSE SERPL-MCNC: 120 MG/DL (ref 65–140)
GLUCOSE SERPL-MCNC: 148 MG/DL (ref 65–140)
HCT VFR BLD AUTO: 30.2 % (ref 34.8–46.1)
HGB BLD-MCNC: 10 G/DL (ref 11.5–15.4)
MAGNESIUM SERPL-MCNC: 2 MG/DL (ref 1.9–2.7)
MCH RBC QN AUTO: 30.8 PG (ref 26.8–34.3)
MCHC RBC AUTO-ENTMCNC: 33.1 G/DL (ref 31.4–37.4)
MCV RBC AUTO: 93 FL (ref 82–98)
PHOSPHATE SERPL-MCNC: 4.5 MG/DL (ref 2.3–4.1)
PLATELET # BLD AUTO: 205 THOUSANDS/UL (ref 149–390)
PMV BLD AUTO: 11.6 FL (ref 8.9–12.7)
POTASSIUM SERPL-SCNC: 3.5 MMOL/L (ref 3.5–5.3)
RBC # BLD AUTO: 3.25 MILLION/UL (ref 3.81–5.12)
SODIUM SERPL-SCNC: 139 MMOL/L (ref 135–147)
WBC # BLD AUTO: 8.85 THOUSAND/UL (ref 4.31–10.16)

## 2025-07-01 PROCEDURE — 83735 ASSAY OF MAGNESIUM: CPT

## 2025-07-01 PROCEDURE — 99239 HOSP IP/OBS DSCHRG MGMT >30: CPT | Performed by: INTERNAL MEDICINE

## 2025-07-01 PROCEDURE — 85027 COMPLETE CBC AUTOMATED: CPT

## 2025-07-01 PROCEDURE — 82948 REAGENT STRIP/BLOOD GLUCOSE: CPT

## 2025-07-01 PROCEDURE — 99232 SBSQ HOSP IP/OBS MODERATE 35: CPT | Performed by: INTERNAL MEDICINE

## 2025-07-01 PROCEDURE — 84100 ASSAY OF PHOSPHORUS: CPT

## 2025-07-01 PROCEDURE — 80048 BASIC METABOLIC PNL TOTAL CA: CPT

## 2025-07-01 RX ORDER — AMLODIPINE BESYLATE 10 MG/1
10 TABLET ORAL DAILY
Qty: 30 TABLET | Refills: 0 | Status: SHIPPED | OUTPATIENT
Start: 2025-07-02

## 2025-07-01 RX ORDER — HYDRALAZINE HYDROCHLORIDE 25 MG/1
75 TABLET, FILM COATED ORAL EVERY 8 HOURS SCHEDULED
Qty: 90 TABLET | Refills: 0 | Status: SHIPPED | OUTPATIENT
Start: 2025-07-01 | End: 2025-07-08 | Stop reason: SDUPTHER

## 2025-07-01 RX ADMIN — HEPARIN SODIUM 5000 UNITS: 5000 INJECTION INTRAVENOUS; SUBCUTANEOUS at 06:04

## 2025-07-01 RX ADMIN — SUCRALFATE 1 G: 1 TABLET ORAL at 06:04

## 2025-07-01 RX ADMIN — ESCITALOPRAM OXALATE 10 MG: 10 TABLET ORAL at 08:34

## 2025-07-01 RX ADMIN — HYDRALAZINE HYDROCHLORIDE 75 MG: 25 TABLET ORAL at 06:04

## 2025-07-01 RX ADMIN — Medication 250 MG: at 08:34

## 2025-07-01 RX ADMIN — Medication 100 MG: at 08:34

## 2025-07-01 RX ADMIN — Medication 2000 UNITS: at 08:34

## 2025-07-01 RX ADMIN — PANTOPRAZOLE SODIUM 40 MG: 40 TABLET, DELAYED RELEASE ORAL at 08:34

## 2025-07-01 RX ADMIN — ASPIRIN 81 MG: 81 TABLET, DELAYED RELEASE ORAL at 08:34

## 2025-07-01 RX ADMIN — LEVOTHYROXINE SODIUM 125 MCG: 0.12 TABLET ORAL at 06:05

## 2025-07-01 RX ADMIN — AMLODIPINE BESYLATE 10 MG: 10 TABLET ORAL at 08:34

## 2025-07-01 RX ADMIN — HYDRALAZINE HYDROCHLORIDE 75 MG: 25 TABLET ORAL at 13:31

## 2025-07-01 NOTE — PROGRESS NOTES
Progress Note - Nephrology   Name: Trina Davis 75 y.o. female I MRN: [id number]  Unit/Bed#: S -01 I Date of Admission: 6/29/2025   Date of Service: 7/1/2025 I Hospital Day: 2     Assessment & Plan  Stage 5 chronic kidney disease not on chronic dialysis (HCC)  Lab Results   Component Value Date    EGFR 7 07/01/2025    EGFR 7 06/30/2025    EGFR 6 06/29/2025    CREATININE 5.34 (H) 07/01/2025    CREATININE 5.30 (H) 06/30/2025    CREATININE 5.50 (H) 06/29/2025   Follows with Dr. Moi Baires  Chronic kidney disease due to diabetic nephropathy, hypertensive nephrosclerosis, arteriolar nephrosclerosis, chronic NSAIDs use.  Prior SPEP and UPEP was negative.  Previously had negative ELOY  Baseline creatinine has been around 4.7 to 5.2 mg/dL since June 2025 with CKD progression.  Previously in April creatinine was around 4.0 mg/dL  Admission creatinine on 6/29 was 5.50 mg/dL  Since admission renal function has improved slightly and creatinine is essentially staying at 5.3 with BUN 55.  Mild uremic signs and symptoms such as poor appetite.  Thankfully potassium and acid-base balance is acceptable.  Phosphorus is slightly elevated but at target for current GFR  No indication for dialysis at this time.  Stable from a nephrology standpoint  Outpatient follow-up with Dr. Baires.  Her next appointment is scheduled for 7/29  Patient is going to follow-up with palliative care.  Uncertain if she wants to proceed renal replacement therapy option.  Renovascular hypertension  - Blood pressure was elevated admission as patient was recently taken off amlodipine 10 mg and placed on metoprolol as outpatient by PCP.  Was continued on hydralazine but dose was increased to 75 mg twice daily as outpatient  - Patient was started back on amlodipine and hydralazine was increased to 75 mg twice daily.  -Avoid beta-blockers due to bradycardia  - Last blood pressure 154/81.  - Continue to monitor and allow current medications to reach steady  state.  No changes recommended at this time  Anemia due to stage 5 chronic kidney disease  (HCC)  - Hemoglobin currently up to 10 which is at target.  Continue to monitor  Type 2 diabetes mellitus with kidney complication, with long-term current use of insulin ([facility])  Lab Results   Component Value Date    HGBA1C 5.8 (H) 06/29/2025       Recent Labs     06/30/25  1512 06/30/25  1550 07/01/25  0715 07/01/25  1056   POCGLU 60* 109 120 148*       Blood Sugar Average: Last 72 hrs:  (P) [id number]  -Continue management per primary team.  Currently on insulin  Hyperphosphatemia  - Phosphorus level 4.5.  Continue PhosLo, low phosphorus diet  Left lateral abdominal pain  - Chest x-ray final read: No acute cardiopulmonary disease.  Moderate hiatal hernia with clips about the GE junction.  Lungs clear.        Subjective       Review of systems: Reports that appetite is just so-so.  No pain or shortness of breath.  No chest pain.  Edema has improved.    Objective :  Temp:  [98.6 °F (37 °C)-99.4 °F (37.4 °C)] 99.4 °F (37.4 °C)  HR:  [65-80] 73  BP: (137-168)/(67-81) 154/81  Resp:  [16-19] 18  SpO2:  [92 %-96 %] 96 %  O2 Device: None (Room air)    Current Weight:    First Weight:    I/O         06/29 0701  06/30 0700 06/30 0701  07/01 0700 07/01 0701  07/02 0700    P.O. 120 480     Total Intake 120 480     Net +120 +480                  Physical Exam  Vitals reviewed.   Constitutional:       General: She is not in acute distress.     Appearance: She is not ill-appearing, toxic-appearing or diaphoretic.   HENT:      Head: Normocephalic and atraumatic.      Nose: Nose normal. No congestion.      Mouth/Throat:      Mouth: Mucous membranes are moist.     Eyes:      General: No scleral icterus.        Right eye: No discharge.         Left eye: No discharge.      Extraocular Movements: Extraocular movements intact.      Conjunctiva/sclera: Conjunctivae normal.     Neck:      Vascular: No carotid bruit or JVD.  "    Cardiovascular:      Rate and Rhythm: Normal rate and regular rhythm.      Heart sounds: No murmur heard.     No friction rub. No gallop.   Pulmonary:      Effort: Pulmonary effort is normal. No respiratory distress.      Breath sounds: Normal breath sounds. No wheezing, rhonchi or rales.   Abdominal:      General: Bowel sounds are normal. There is no distension.      Palpations: Abdomen is soft.      Tenderness: There is no abdominal tenderness.     Musculoskeletal:         General: No swelling, tenderness or signs of injury.      Cervical back: Normal range of motion. No rigidity or tenderness.      Right lower leg: No edema.      Left lower leg: No edema.     Skin:     General: Skin is warm and dry.      Capillary Refill: Capillary refill takes less than 2 seconds.      Coloration: Skin is not jaundiced or pale.      Findings: No erythema or rash.     Neurological:      Mental Status: She is alert and oriented to person, place, and time.     Psychiatric:         Mood and Affect: Mood normal.         Behavior: Behavior normal.         Thought Content: Thought content normal.         Judgment: Judgment normal.         Medications:  Current Medications[1]      Lab Results: I have reviewed the following results:  Results from last 7 days   Lab Units 07/01/25  0541 06/30/25  0451 06/29/25  1037 06/26/25  0637   WBC Thousand/uL 8.85 8.89 9.25 7.83   HEMOGLOBIN g/dL 10.0* 9.3* 10.6* 9.7*   HEMATOCRIT % 30.2* 27.2* 30.5* 30.2*   PLATELETS Thousands/uL 205 221 245 221   POTASSIUM mmol/L 3.5 3.5 3.9 3.6   CHLORIDE mmol/L 107 107 106 108   CO2 mmol/L 22 24 23 24   BUN mg/dL 55* 61* 60* 64*   CREATININE mg/dL 5.34* 5.30* 5.50* 5.15*   CALCIUM mg/dL 8.8 8.6 9.1 8.7   MAGNESIUM mg/dL 2.0 1.9  --   --    PHOSPHORUS mg/dL 4.5* 4.4*  --   --    ALBUMIN g/dL  --   --  3.9  --        Administrative Statements     Portions of the record may have been created with voice recognition software. Occasional wrong word or \"sound a " "like\" substitutions may have occurred due to the inherent limitations of voice recognition software. Read the chart carefully and recognize, using context, where substitutions have occurred.If you have any questions, please contact the dictating provider.       [1]   Current Facility-Administered Medications:     acetaminophen (TYLENOL) tablet 975 mg, 975 mg, Oral, Q6H PRN, Holly Balasundram, DO, 975 mg at 06/30/25 1246    albuterol (PROVENTIL HFA,VENTOLIN HFA) inhaler 2 puff, 2 puff, Inhalation, Q6H PRN, Holly Balasundram, DO    allopurinol (ZYLOPRIM) tablet 50 mg, 50 mg, Oral, Once per day on Monday Thursday, Holly Balasundram, DO, 50 mg at 06/30/25 0828    amLODIPine (NORVASC) tablet 10 mg, 10 mg, Oral, Daily, Tyler Gallardo MD, 10 mg at 07/01/25 0834    aspirin (ECOTRIN LOW STRENGTH) EC tablet 81 mg, 81 mg, Oral, Daily, Holly Balasundram, DO, 81 mg at 07/01/25 0834    calcium acetate (PHOSLO) capsule 667 mg, 667 mg, Oral, After Dinner, Holly Balasundram, DO, 667 mg at 06/30/25 1726    Cholecalciferol (VITAMIN D3) tablet 2,000 Units, 2,000 Units, Oral, Daily, Holly Balasundram, DO, 2,000 Units at 07/01/25 0834    co-enzyme Q-10 capsule 100 mg, 100 mg, Oral, Daily, Holly Balasundram, DO, 100 mg at 07/01/25 0834    cyanocobalamin (VITAMIN B-12) tablet 1,000 mcg, 1,000 mcg, Oral, Weekly, Holly Balasundram, DO, 1,000 mcg at 06/29/25 1726    docusate sodium (COLACE) capsule 100 mg, 100 mg, Oral, Daily PRN, Holly Balasundram, DO    escitalopram (LEXAPRO) tablet 10 mg, 10 mg, Oral, Daily, Holly Balasundram, DO, 10 mg at 07/01/25 0834    gabapentin (NEURONTIN) capsule 100 mg, 100 mg, Oral, Daily PRN, Holly Balasundram, DO    heparin (porcine) subcutaneous injection 5,000 Units, 5,000 Units, Subcutaneous, Q8H ELISHA, Holly Azarram, , 5,000 Units at 07/01/25 0604    hydrALAZINE (APRESOLINE) tablet 75 mg, 75 mg, Oral, Q8H ELISHA, Lynsey Weinstein MD, 75 mg at 07/01/25 0604    insulin glargine (LANTUS) subcutaneous " injection 10 Units 0.1 mL, 10 Units, Subcutaneous, HS, Holly Balasundram, DO, 10 Units at 06/30/25 2130    insulin lispro (HumALOG/ADMELOG) 100 units/mL subcutaneous injection 1-5 Units, 1-5 Units, Subcutaneous, TID AC, 1 Units at 06/30/25 1122 **AND** Fingerstick Glucose (POCT), , , TID AC, Holly Balasundram, DO    labetalol (NORMODYNE) injection 20 mg, 20 mg, Intravenous, Q4H PRN, Holly Balasundram, DO    levothyroxine tablet 125 mcg, 125 mcg, Oral, Early Morning, Holly Balasundram, DO, 125 mcg at 07/01/25 0605    loratadine (CLARITIN) tablet 10 mg, 10 mg, Oral, Q48H, Holly Balasundram, DO, 10 mg at 06/29/25 1724    ondansetron (ZOFRAN) injection 4 mg, 4 mg, Intravenous, Q6H PRN, Holly Balasundram, DO    pantoprazole (PROTONIX) EC tablet 40 mg, 40 mg, Oral, BID, Holly Balasundram, DO, 40 mg at 07/01/25 0834    polyethylene glycol (MIRALAX) packet 17 g, 17 g, Oral, Once, Lynsey Weinstein MD    pramipexole (MIRAPEX) tablet 0.25 mg, 0.25 mg, Oral, HS, Holly Balasundram, DO, 0.25 mg at 06/30/25 2130    pravastatin (PRAVACHOL) tablet 40 mg, 40 mg, Oral, Daily With Dinner, Holly Balasundram, DO, 40 mg at 06/30/25 1726    psyllium (METAMUCIL) 1 packet, 1 packet, Oral, Daily, Holly Balasundram, DO, 1 packet at 06/30/25 0828    saccharomyces boulardii (FLORASTOR) capsule 250 mg, 250 mg, Oral, BID, Holly Balasundram, DO, 250 mg at 07/01/25 0834    sucralfate (CARAFATE) tablet 1 g, 1 g, Oral, TID AC, Holly Balasundram, DO, 1 g at 07/01/25 0604

## 2025-07-01 NOTE — ASSESSMENT & PLAN NOTE
- Chest x-ray final read: No acute cardiopulmonary disease.  Moderate hiatal hernia with clips about the GE junction.  Lungs clear.

## 2025-07-01 NOTE — ASSESSMENT & PLAN NOTE
Lab Results   Component Value Date    HGBA1C 5.8 (H) 06/29/2025       Recent Labs     06/30/25  1512 06/30/25  1550 07/01/25  0715 07/01/25  1056   POCGLU 60* 109 120 148*       Blood Sugar Average: Last 72 hrs:  (P) 134.6451717377293225  -Continue management per primary team.  Currently on insulin

## 2025-07-01 NOTE — ASSESSMENT & PLAN NOTE
- Blood pressure was elevated admission as patient was recently taken off amlodipine 10 mg and placed on metoprolol as outpatient by PCP.  Was continued on hydralazine but dose was increased to 75 mg twice daily as outpatient  - Patient was started back on amlodipine and hydralazine was increased to 75 mg twice daily.  -Avoid beta-blockers due to bradycardia  - Last blood pressure 154/81.  - Continue to monitor and allow current medications to reach steady state.  No changes recommended at this time

## 2025-07-01 NOTE — ASSESSMENT & PLAN NOTE
Lab Results   Component Value Date    EGFR 7 07/01/2025    EGFR 7 06/30/2025    EGFR 6 06/29/2025    CREATININE 5.34 (H) 07/01/2025    CREATININE 5.30 (H) 06/30/2025    CREATININE 5.50 (H) 06/29/2025   Follows with Dr. Moi Baires  Chronic kidney disease due to diabetic nephropathy, hypertensive nephrosclerosis, arteriolar nephrosclerosis, chronic NSAIDs use.  Prior SPEP and UPEP was negative.  Previously had negative ELOY  Baseline creatinine has been around 4.7 to 5.2 mg/dL since June 2025 with CKD progression.  Previously in April creatinine was around 4.0 mg/dL  Admission creatinine on 6/29 was 5.50 mg/dL  Since admission renal function has improved slightly and creatinine is essentially staying at 5.3 with BUN 55.  Mild uremic signs and symptoms such as poor appetite.  Thankfully potassium and acid-base balance is acceptable.  Phosphorus is slightly elevated but at target for current GFR  No indication for dialysis at this time.  Stable from a nephrology standpoint  Outpatient follow-up with Dr. Baires.  Her next appointment is scheduled for 7/29  Patient is going to follow-up with palliative care.  Uncertain if she wants to proceed renal replacement therapy option.

## 2025-07-07 ENCOUNTER — RESULTS FOLLOW-UP (OUTPATIENT)
Dept: NEPHROLOGY | Facility: CLINIC | Age: 75
End: 2025-07-07

## 2025-07-07 ENCOUNTER — APPOINTMENT (OUTPATIENT)
Dept: LAB | Facility: AMBULARY SURGERY CENTER | Age: 75
End: 2025-07-07
Attending: INTERNAL MEDICINE
Payer: MEDICARE

## 2025-07-07 DIAGNOSIS — N18.5 STAGE 5 CHRONIC KIDNEY DISEASE NOT ON CHRONIC DIALYSIS (HCC): ICD-10-CM

## 2025-07-07 DIAGNOSIS — N18.5 STAGE 5 CHRONIC KIDNEY DISEASE NOT ON CHRONIC DIALYSIS (HCC): Primary | ICD-10-CM

## 2025-07-07 LAB
ANION GAP SERPL CALCULATED.3IONS-SCNC: 12 MMOL/L (ref 4–13)
BUN SERPL-MCNC: 62 MG/DL (ref 5–25)
CALCIUM SERPL-MCNC: 9.2 MG/DL (ref 8.4–10.2)
CHLORIDE SERPL-SCNC: 101 MMOL/L (ref 96–108)
CO2 SERPL-SCNC: 24 MMOL/L (ref 21–32)
CREAT SERPL-MCNC: 5.33 MG/DL (ref 0.6–1.3)
GFR SERPL CREATININE-BSD FRML MDRD: 7 ML/MIN/1.73SQ M
GLUCOSE P FAST SERPL-MCNC: 110 MG/DL (ref 65–99)
POTASSIUM SERPL-SCNC: 3.8 MMOL/L (ref 3.5–5.3)
SODIUM SERPL-SCNC: 137 MMOL/L (ref 135–147)

## 2025-07-07 PROCEDURE — 80048 BASIC METABOLIC PNL TOTAL CA: CPT

## 2025-07-07 PROCEDURE — 36415 COLL VENOUS BLD VENIPUNCTURE: CPT

## 2025-07-07 NOTE — TELEPHONE ENCOUNTER
----- Message from Donaldo Baires MD sent at 7/7/2025 12:18 PM EDT -----  Labs are stable repeat a BMP in 4 to 6 weeks please  ----- Message -----  From: Lab, Background User  Sent: 7/7/2025  12:13 PM EDT  To: Donaldo Baires MD

## 2025-07-08 ENCOUNTER — OFFICE VISIT (OUTPATIENT)
Dept: INTERNAL MEDICINE CLINIC | Facility: CLINIC | Age: 75
End: 2025-07-08
Payer: MEDICARE

## 2025-07-08 VITALS
HEART RATE: 74 BPM | TEMPERATURE: 97.6 F | OXYGEN SATURATION: 98 % | DIASTOLIC BLOOD PRESSURE: 78 MMHG | WEIGHT: 150 LBS | RESPIRATION RATE: 16 BRPM | SYSTOLIC BLOOD PRESSURE: 138 MMHG | BODY MASS INDEX: 26.58 KG/M2 | HEIGHT: 63 IN

## 2025-07-08 DIAGNOSIS — Z78.9 TRANSITION OF CARE: Primary | ICD-10-CM

## 2025-07-08 DIAGNOSIS — N18.5 DIABETES MELLITUS DUE TO UNDERLYING CONDITION WITH STAGE 5 CHRONIC KIDNEY DISEASE NOT ON CHRONIC DIALYSIS, WITH LONG-TERM CURRENT USE OF INSULIN (HCC): ICD-10-CM

## 2025-07-08 DIAGNOSIS — E08.22 DIABETES MELLITUS DUE TO UNDERLYING CONDITION WITH STAGE 5 CHRONIC KIDNEY DISEASE NOT ON CHRONIC DIALYSIS, WITH LONG-TERM CURRENT USE OF INSULIN (HCC): ICD-10-CM

## 2025-07-08 DIAGNOSIS — Z79.4 DIABETES MELLITUS DUE TO UNDERLYING CONDITION WITH STAGE 5 CHRONIC KIDNEY DISEASE NOT ON CHRONIC DIALYSIS, WITH LONG-TERM CURRENT USE OF INSULIN (HCC): ICD-10-CM

## 2025-07-08 DIAGNOSIS — K21.9 GASTROESOPHAGEAL REFLUX DISEASE WITHOUT ESOPHAGITIS: ICD-10-CM

## 2025-07-08 DIAGNOSIS — E78.1 HYPERTRIGLYCERIDEMIA: ICD-10-CM

## 2025-07-08 DIAGNOSIS — F01.A0 MILD VASCULAR DEMENTIA WITHOUT BEHAVIORAL DISTURBANCE, PSYCHOTIC DISTURBANCE, MOOD DISTURBANCE, OR ANXIETY (HCC): ICD-10-CM

## 2025-07-08 DIAGNOSIS — D80.3 IMMUNOGLOBULIN G SUBCLASS DEFICIENCY (HCC): ICD-10-CM

## 2025-07-08 DIAGNOSIS — K31.84 GASTROPARESIS DIABETICORUM  (HCC): ICD-10-CM

## 2025-07-08 DIAGNOSIS — E11.43 GASTROPARESIS DIABETICORUM  (HCC): ICD-10-CM

## 2025-07-08 DIAGNOSIS — I15.0 RENOVASCULAR HYPERTENSION: ICD-10-CM

## 2025-07-08 PROBLEM — M10.372 ACUTE GOUT DUE TO RENAL IMPAIRMENT INVOLVING LEFT FOOT: Status: RESOLVED | Noted: 2025-04-08 | Resolved: 2025-07-08

## 2025-07-08 PROCEDURE — 99496 TRANSJ CARE MGMT HIGH F2F 7D: CPT | Performed by: INTERNAL MEDICINE

## 2025-07-08 RX ORDER — HYDRALAZINE HYDROCHLORIDE 25 MG/1
75 TABLET, FILM COATED ORAL EVERY 8 HOURS SCHEDULED
Qty: 270 TABLET | Refills: 2 | Status: SHIPPED | OUTPATIENT
Start: 2025-07-08 | End: 2025-10-06

## 2025-07-08 RX ORDER — SUCRALFATE 1 G/1
1 TABLET ORAL
Start: 2025-07-08

## 2025-07-08 RX ORDER — ROSUVASTATIN CALCIUM 5 MG/1
5 TABLET, COATED ORAL EVERY OTHER DAY
Start: 2025-07-08

## 2025-07-08 NOTE — ASSESSMENT & PLAN NOTE
Hospital evaluation reviewed, no incidental findings.  She will continue medication as instructed on discharge.

## 2025-07-08 NOTE — ASSESSMENT & PLAN NOTE
Lab Results   Component Value Date    HGBA1C 5.8 (H) 06/29/2025     She only takes sucralfate with meals twice a day and Protonix twice a day  Orders:    sucralfate (CARAFATE) 1 g tablet; Take 1 tablet (1 g total) by mouth 2 (two) times a day before meals

## 2025-07-08 NOTE — ASSESSMENT & PLAN NOTE
Continue Protonix and Carafate twice a day  Orders:    sucralfate (CARAFATE) 1 g tablet; Take 1 tablet (1 g total) by mouth 2 (two) times a day before meals

## 2025-07-08 NOTE — ASSESSMENT & PLAN NOTE
Lab Results   Component Value Date    HGBA1C 5.8 (H) 06/29/2025     Her A1c is below 6, due to her age and comorbidities will discontinue mealtime insulin and continue with nighttime insulin for now.

## 2025-07-08 NOTE — ASSESSMENT & PLAN NOTE
Will reduce Crestor to 5 every other day.  Orders:    rosuvastatin (CRESTOR) 5 mg tablet; Take 1 tablet (5 mg total) by mouth every other day

## 2025-07-08 NOTE — ASSESSMENT & PLAN NOTE
Blood pressure has been acceptable, she does have a few episodes of elevated BP mainly in the morning before right after she takes her medications, for now we will continue amlodipine 10 mg and hydralazine 75mg  3 times a day and with close follow-up she will come back in 4 weeks.  Orders:    hydrALAZINE (APRESOLINE) 25 mg tablet; Take 3 tablets (75 mg total) by mouth every 8 (eight) hours

## 2025-07-08 NOTE — PROGRESS NOTES
Transition of Care Visit:  Name: Trina Davis      : 1950      MRN: [id number]  Encounter Provider: Maryse Rae MD  Encounter Date: 2025   Encounter department: [facility] INTERNAL MEDICINE [place]    Assessment & Plan  Transition of care  Hospital evaluation reviewed, no incidental findings.  She will continue medication as instructed on discharge.       Renovascular hypertension  Blood pressure has been acceptable, she does have a few episodes of elevated BP mainly in the morning before right after she takes her medications, for now we will continue amlodipine 10 mg and hydralazine 75mg  3 times a day and with close follow-up she will come back in 4 weeks.  Orders:    hydrALAZINE (APRESOLINE) 25 mg tablet; Take 3 tablets (75 mg total) by mouth every 8 (eight) hours    Hypertriglyceridemia  Will reduce Crestor to 5 every other day.  Orders:    rosuvastatin (CRESTOR) 5 mg tablet; Take 1 tablet (5 mg total) by mouth every other day    Immunoglobulin G subclass deficiency (HCC)         Mild vascular dementia without behavioral disturbance, psychotic disturbance, mood disturbance, or anxiety (HCC)  Stable       Diabetes mellitus due to underlying condition with stage 5 chronic kidney disease not on chronic dialysis, with long-term current use of insulin ([facility])    Lab Results   Component Value Date    HGBA1C 5.8 (H) 2025     Her A1c is below 6, due to her age and comorbidities will discontinue mealtime insulin and continue with nighttime insulin for now.       Gastroparesis diabeticorum  ([facility])    Lab Results   Component Value Date    HGBA1C 5.8 (H) 2025     She only takes sucralfate with meals twice a day and Protonix twice a day  Orders:    sucralfate (CARAFATE) 1 g tablet; Take 1 tablet (1 g total) by mouth 2 (two) times a day before meals    Gastroesophageal reflux disease without esophagitis  Continue Protonix and Carafate twice a day  Orders:    sucralfate (CARAFATE) 1 g tablet; Take  1 tablet (1 g total) by mouth 2 (two) times a day before meals         History of Present Illness     Transitional Care Management Review:   Trina Davis is a 75 y.o. female here for TCM follow up.     During the TCM phone call patient stated:  TCM Call (since 6/24/2025)       Date and time call was made  7/1/2025  2:15 PM    Hospital care reviewed  Records reviewed    Patient was hospitialized at  [facility]    Date of Admission  06/29/25    Date of discharge  07/01/25    Disposition  Home    Were the patients medications reviewed and updated  Yes    Current Symptoms  Fatigue          TCM Call (since 6/24/2025)       Post hospital issues  None    Scheduled for follow up?  Yes    Did you obtain your prescribed medications  Yes    Do you need help managing your prescriptions or medications  No    Is transportation to your appointment needed  No    Living Arrangements  Family members    Support System  Family    The type of support provided  Emotional; Financial; Physical    Do you have social support  Yes, as much as I need    Are you recieving home care services  No          Patient presents in office for transition of care, she was hospitalized from 06/29 to 07/01 because of elevated blood pressure.  Her blood pressure regimen was changed due to fatigue and bradycardia, during hospitalization her regiment was optimized she was put back on amlodipine and increased hydralazine which she has been continued outpatient.  At home her blood pressure has been ranging between 130/80 to 160/90 most of the time, she is overall feeling better but still fatigued when doing ordinary activities at home or if she walks about a block.      Review of Systems   Constitutional:  Positive for activity change and fatigue.   Respiratory:  Negative for cough and shortness of breath.    Cardiovascular:  Positive for palpitations. Negative for chest pain.   Gastrointestinal:  Positive for constipation. Negative for abdominal pain.  "  Skin:  Negative for rash.   Neurological:  Positive for light-headedness.     Objective   /78 (BP Location: Right arm, Patient Position: Sitting, Cuff Size: Adult)   Pulse 74   Temp 97.6 °F (36.4 °C) (Tympanic)   Resp 16   Ht 5' 3\" (1.6 m)   Wt 68 kg (150 lb)   SpO2 98%   BMI 26.57 kg/m²     Physical Exam  Constitutional:       Appearance: Normal appearance.   HENT:      Head: Normocephalic and atraumatic.      Mouth/Throat:      Mouth: Mucous membranes are moist.     Eyes:      Extraocular Movements: Extraocular movements intact.      Pupils: Pupils are equal, round, and reactive to light.       Cardiovascular:      Rate and Rhythm: Normal rate and regular rhythm.   Pulmonary:      Effort: Pulmonary effort is normal. No respiratory distress.      Breath sounds: No wheezing.   Abdominal:      General: There is no distension.      Palpations: Abdomen is soft.     Musculoskeletal:         General: Normal range of motion.      Cervical back: Normal range of motion.      Right lower leg: No edema.      Left lower leg: No edema.     Skin:     Coloration: Skin is pale.     Neurological:      Mental Status: She is alert and oriented to person, place, and time.     Psychiatric:         Thought Content: Thought content normal.         Judgment: Judgment normal.       Medications have been reviewed by provider in current encounter      "

## 2025-07-09 PROBLEM — Z00.00 MEDICARE ANNUAL WELLNESS VISIT, SUBSEQUENT: Status: RESOLVED | Noted: 2025-06-09 | Resolved: 2025-07-09

## 2025-07-14 ENCOUNTER — TELEPHONE (OUTPATIENT)
Dept: DERMATOLOGY | Facility: CLINIC | Age: 75
End: 2025-07-14

## 2025-07-15 ENCOUNTER — OFFICE VISIT (OUTPATIENT)
Dept: INTERNAL MEDICINE CLINIC | Facility: CLINIC | Age: 75
End: 2025-07-15
Payer: MEDICARE

## 2025-07-15 VITALS
TEMPERATURE: 97.4 F | DIASTOLIC BLOOD PRESSURE: 70 MMHG | HEART RATE: 76 BPM | HEIGHT: 63 IN | OXYGEN SATURATION: 97 % | RESPIRATION RATE: 16 BRPM | SYSTOLIC BLOOD PRESSURE: 140 MMHG | BODY MASS INDEX: 26.05 KG/M2 | WEIGHT: 147 LBS

## 2025-07-15 DIAGNOSIS — I12.0 BENIGN HYPERTENSIVE KIDNEY DISEASE WITH CHRONIC KIDNEY DISEASE STAGE V OR END STAGE RENAL DISEASE (HCC): ICD-10-CM

## 2025-07-15 DIAGNOSIS — J34.89 SORE IN NOSE: Primary | ICD-10-CM

## 2025-07-15 PROCEDURE — 99212 OFFICE O/P EST SF 10 MIN: CPT | Performed by: INTERNAL MEDICINE

## 2025-07-15 PROCEDURE — G2211 COMPLEX E/M VISIT ADD ON: HCPCS | Performed by: INTERNAL MEDICINE

## 2025-07-15 RX ORDER — MUPIROCIN 2 %
OINTMENT (GRAM) TOPICAL 3 TIMES DAILY
Qty: 15 G | Refills: 0 | Status: CANCELLED | OUTPATIENT
Start: 2025-07-15 | End: 2025-07-22

## 2025-07-18 ENCOUNTER — CONSULT (OUTPATIENT)
Dept: PALLIATIVE MEDICINE | Facility: CLINIC | Age: 75
End: 2025-07-18
Attending: INTERNAL MEDICINE
Payer: MEDICARE

## 2025-07-18 ENCOUNTER — HOME CARE VISIT (OUTPATIENT)
Dept: HOME HOSPICE | Facility: HOSPICE | Age: 75
End: 2025-07-18
Payer: MEDICARE

## 2025-07-18 ENCOUNTER — SOCIAL WORK (OUTPATIENT)
Dept: PALLIATIVE MEDICINE | Facility: CLINIC | Age: 75
End: 2025-07-18
Payer: MEDICARE

## 2025-07-18 VITALS
TEMPERATURE: 97 F | HEART RATE: 80 BPM | OXYGEN SATURATION: 98 % | HEIGHT: 63 IN | BODY MASS INDEX: 26.4 KG/M2 | SYSTOLIC BLOOD PRESSURE: 144 MMHG | WEIGHT: 149 LBS | DIASTOLIC BLOOD PRESSURE: 74 MMHG

## 2025-07-18 DIAGNOSIS — Z71.89 GOALS OF CARE, COUNSELING/DISCUSSION: Primary | ICD-10-CM

## 2025-07-18 DIAGNOSIS — Z51.5 PALLIATIVE CARE ENCOUNTER: ICD-10-CM

## 2025-07-18 DIAGNOSIS — Z71.89 COUNSELING AND COORDINATION OF CARE: Primary | ICD-10-CM

## 2025-07-18 DIAGNOSIS — D62 ACUTE BLOOD LOSS ANEMIA: ICD-10-CM

## 2025-07-18 DIAGNOSIS — G62.9 NEUROPATHY: ICD-10-CM

## 2025-07-18 DIAGNOSIS — Z79.4 TYPE 2 DIABETES MELLITUS WITH OTHER DIABETIC KIDNEY COMPLICATION, WITH LONG-TERM CURRENT USE OF INSULIN (HCC): ICD-10-CM

## 2025-07-18 DIAGNOSIS — E11.29 TYPE 2 DIABETES MELLITUS WITH OTHER DIABETIC KIDNEY COMPLICATION, WITH LONG-TERM CURRENT USE OF INSULIN (HCC): ICD-10-CM

## 2025-07-18 DIAGNOSIS — N18.5 CKD (CHRONIC KIDNEY DISEASE) STAGE 5, GFR LESS THAN 15 ML/MIN (HCC): ICD-10-CM

## 2025-07-18 PROCEDURE — 99205 OFFICE O/P NEW HI 60 MIN: CPT | Performed by: STUDENT IN AN ORGANIZED HEALTH CARE EDUCATION/TRAINING PROGRAM

## 2025-07-18 PROCEDURE — 99497 ADVNCD CARE PLAN 30 MIN: CPT | Performed by: STUDENT IN AN ORGANIZED HEALTH CARE EDUCATION/TRAINING PROGRAM

## 2025-07-18 PROCEDURE — NC001 PR NO CHARGE

## 2025-07-18 RX ORDER — PANTOPRAZOLE SODIUM 40 MG/1
40 TABLET, DELAYED RELEASE ORAL 2 TIMES DAILY
Qty: 60 TABLET | Refills: 5 | Status: SHIPPED | OUTPATIENT
Start: 2025-07-18

## 2025-07-18 NOTE — ASSESSMENT & PLAN NOTE
"Lab Results   Component Value Date    EGFR 7 07/07/2025    EGFR 7 07/01/2025    EGFR 7 06/30/2025    CREATININE 5.33 (H) 07/07/2025    CREATININE 5.34 (H) 07/01/2025    CREATININE 5.30 (H) 06/30/2025     Has followed specialists including Nephrology  Patient states Dialysis is not within her goals and does not wish to pursue this. She understands what it means should she forgo dialysis and the potential for worsening kidney function and the associated side effects that can result from worsening kidney functioning.     Patient states she has some good days and some bad days. We discussed the \"What If\" scenarios as well as what can happen as well as the signs of progression of disease.    Orders:    Ambulatory Referral to Palliative Care    Ambulatory Referral to Hospice; Future    "

## 2025-07-18 NOTE — ASSESSMENT & PLAN NOTE
Psychosocial   Supportive listening provided  Normalized experience of patient/family  Provided anxiety containment     Referrals Placed / Medical Equipment Ordered  -[facility] referral    Follow-Up Recommendations  -Follow-up with PCP and current medical specialists  -Follow-up with palliative care: follow-up pending discussion with Hospice services to determine goals.

## 2025-07-18 NOTE — PROGRESS NOTES
Palliative Outpatient Assessment of Need    SW completed an assessment of need which was completed with patient and spouse in the office.    Relationship status:   Duration of relationship: 58 Years  Name of significant other: Bruce Davis  Children and Ages: Pt has 2 adult Sons.  Pets: 1 Dog  Other important family information:   Living situation (where and whom): Pt lives in the home with spouse and Son Tee.  Patient's primary caregiver: Self   Any limitations of caregiver: Fatigued, Energy, Mobility/Balance   Environmental concerns or barriers:   history: [employer] for 3-4 years.   Employment history/source of income: Retired   Disability:    Concerns regarding literacy: None  Spirituality/ Anabaptism: Orthodox attends Congregation every .  Patient's strengths, social supports, and resources: Supportive Family and Alevism Family   Cultural information:   Mental Health current or previous: Pt reports not having a hx of anxiety and depression and currently on an antidepressant to help relieve symptoms.  Substance use or history: pt is a former cigarette smoker and quit 1976.  Sleep: Pt reports being fatigued and having less energy.  Exercise:   Diet/nutrition:  appetite and nausea.  Bowel Movement: Pt reports fluctuating between constipation and diarrhea. Pt does have OTC medication to help with moving bowels.  Durable Medical Equipment needs: Cane and walker prn.   Transportation: No Transportation Concerns “Spouse provides transportation”  Financial concerns: No Financial Concerns   Advanced Directive: Pt has a living will on file an scanned into chart.  Other medical or social work providers involved: Internal med, Nephrology, Urology, [facility], [facility], Hospice   Patient/caregiver current level of coping:    Understanding: Pt appears understanding of current medical status.  Patient/family concerns and areas of need: CKD Stage, fatigued,  appetite, nausea, itching.  Patient's  interests: Pt would like to continue following up with at home hospice and additional support. See plan under palliative care. Hospice referral completed for [facility].      I have spent 60 minutes with patient and spouse today in which greater than 50% of this time was spent in counseling/coordination of care.    *All questions may not be answered due to constraints.  Follow-up discussions may need to occur.

## 2025-07-18 NOTE — PROGRESS NOTES
"Name: Trina Davis      : 1950      MRN: [id number]  Encounter Provider: Jarrod Escalona DO  Encounter Date: 2025   Encounter department: [facility], [place], operated by [facility]  :  Assessment & Plan  CKD (chronic kidney disease) stage 5, GFR less than 15 ml/min (HCC)  Lab Results   Component Value Date    EGFR 7 2025    EGFR 7 2025    EGFR 7 2025    CREATININE 5.33 (H) 2025    CREATININE 5.34 (H) 2025    CREATININE 5.30 (H) 2025     Has followed specialists including Nephrology  Patient states Dialysis is not within her goals and does not wish to pursue this. She understands what it means should she forgo dialysis and the potential for worsening kidney function and the associated side effects that can result from worsening kidney functioning.     Patient states she has some good days and some bad days. We discussed the \"What If\" scenarios as well as what can happen as well as the signs of progression of disease.    Orders:    Ambulatory Referral to Palliative Care    Ambulatory Referral to Hospice; Future    Goals of care, counseling/discussion  Extensive GOC discussion held today.  Patient's goals are to remain home as possible and focus on comfort.  She does not wish to pursue Dialysis as she feels this would not be in line with her vision of quality of life. She states she has already spoken and toured dialysis centers with [facility] and has made the decision that dialysis is not part of her plan.     Reviewed Hospice services, philosophy, role of hospice physician, nurses, aide, DME, medications as well as different levels of care (RLOC at home vs at facility should she require assistance/nursing care that would beyond what can be achieved at home vs IPU level of care - as discussed this would be for uncontrolled symptoms and approval of hospice medical direct at that point in time).     Summary of Plan:  1) Patient would like to speak with Hospice services directly to gain " further information.  -freedom of choice was offered in which patient was open to hearing from [facility] service.  -referral placed today to  Hospice.     Patient's follow-up with me will depend on whether she decides to pursue hospice services. She was provided my card with my number so that she can update me on her decision based on her conversation with hospice services.     All questions and concerns answered today    Orders:    Ambulatory Referral to Hospice; Future    Type 2 diabetes mellitus with other diabetic kidney complication, with long-term current use of insulin ([facility])    Lab Results   Component Value Date    HGBA1C 5.8 (H) 06/29/2025            Palliative care encounter  Psychosocial   Supportive listening provided  Normalized experience of patient/family  Provided anxiety containment     Referrals Placed / Medical Equipment Ordered  - Hospice referral    Follow-Up Recommendations  -Follow-up with PCP and current medical specialists  -Follow-up with palliative care: follow-up pending discussion with Hospice services to determine goals.         Neuropathy             Decisional apparatus: Patient is competent on my exam today. If competence is lost, patient's substitute decision maker would default to spouse by [place] Act 169.   Advance Directive / Living Will / POLST: On file     PDMP Review: I have reviewed the patient's controlled substance dispensing history in the Prescription Drug Monitoring Program in compliance with the TIM regulations before prescribing any controlled substances.    History of Present Illness   Trina Davis is a 75 y.o. female who presents for consultation.    Patient is seen today in office. Alert, oriented, pleasantly conversant.  Patient is seen in NAD.     Appetite has been decreased.     Pain - due to neuropathy of bilateral lower extremities that has been chronic.    Patient is well supported by spouse.      Medical History Reviewed by provider this encounter:   "Tobacco  Allergies  Meds  Problems  Med Hx  Surg Hx  Fam Hx     .  Past Medical History   Past Medical History[1]  Past Surgical History[2]  Family History[3]   reports that she quit smoking about 49 years ago. Her smoking use included cigarettes. She started smoking about 59 years ago. She has a 20 pack-year smoking history. She has been exposed to tobacco smoke. She has never used smokeless tobacco. She reports current alcohol use. She reports that she does not use drugs.  Current Outpatient Medications   Medication Instructions    acetaminophen (TYLENOL) 650 mg, As needed    albuterol (Ventolin HFA) 90 mcg/act inhaler 2 puffs, Inhalation, Every 6 hours PRN    amLODIPine (NORVASC) 10 mg, Oral, Daily    B-D ULTRAFINE III SHORT PEN 31G X 8 MM MISC     calcium acetate (PHOSLO) 667 mg, Oral, Daily after dinner    desloratadine (CLARINEX) 5 mg, Oral, Daily    docusate sodium (COLACE) 100 mg, Daily PRN    escitalopram (LEXAPRO) 10 mg, Oral, Daily    gabapentin (NEURONTIN) 100 mg, As needed    hydrALAZINE (APRESOLINE) 75 mg, Oral, Every 8 hours scheduled    levothyroxine 125 mcg, Oral, Daily    pantoprazole (PROTONIX) 40 mg, Oral, 2 times daily    pramipexole (MIRAPEX) 0.25 mg, Oral, Daily at bedtime    Probiotic Product (PROBIOTIC BLEND PO) 2 times daily    psyllium (METAMUCIL) 58.6 % packet 1 packet, As needed    rosuvastatin (CRESTOR) 5 mg, Oral, Every other day    sucralfate (CARAFATE) 1 g, Oral, 2 times daily before meals    torsemide (DEMADEX) 10 mg, Oral, Daily PRN, Take 10 mg. Daily except Monday, Wednesday, Friday 20 mg.    Tresiba FlexTouch 10 Units, Daily at bedtime    Vitamin B12 1,000 mcg, Oral, Weekly    Vitamin D (Cholecalciferol) 2,000 Units, Daily   Allergies[4]   Medications Ordered Prior to Encounter[5]   Social History[6]     Objective   /74 (BP Location: Left arm, Patient Position: Sitting, Cuff Size: Standard)   Pulse 80   Temp (!) 97 °F (36.1 °C) (Temporal)   Ht 5' 3\" (1.6 m)   Wt " 67.6 kg (149 lb)   SpO2 98%   BMI 26.39 kg/m²     Physical Exam  Vitals reviewed.   Constitutional:       General: She is not in acute distress.     Appearance: She is ill-appearing (chronically). She is not toxic-appearing or diaphoretic.   HENT:      Head: Normocephalic and atraumatic.      Nose: Nose normal.     Eyes:      General:         Right eye: No discharge.         Left eye: No discharge.       Cardiovascular:      Rate and Rhythm: Normal rate.   Pulmonary:      Effort: Pulmonary effort is normal. No respiratory distress.   Abdominal:      General: There is no distension.     Musculoskeletal:         General: No swelling.      Right lower leg: No edema.      Left lower leg: No edema.     Skin:     General: Skin is warm and dry.      Coloration: Skin is pale. Skin is not jaundiced.     Neurological:      General: No focal deficit present.      Mental Status: She is alert. Mental status is at baseline.     Psychiatric:         Mood and Affect: Mood normal.         Behavior: Behavior normal.         Thought Content: Thought content normal.         Judgment: Judgment normal.         Recent labs:  Lab Results   Component Value Date/Time    SODIUM 137 07/07/2025 07:19 AM    SODIUM 139 03/20/2025 09:18 AM    K 3.8 07/07/2025 07:19 AM    K 3.6 03/20/2025 09:18 AM    BUN 62 (H) 07/07/2025 07:19 AM    BUN 66 (H) 03/20/2025 09:18 AM    CREATININE 5.33 (H) 07/07/2025 07:19 AM    CREATININE 3.97 (H) 03/20/2025 09:18 AM    GLUC 110 07/01/2025 05:41 AM    GLUC 97 03/20/2025 09:18 AM    CALCIUM 9.2 07/07/2025 07:19 AM    CALCIUM 9.4 03/20/2025 09:18 AM    AST 23 06/29/2025 10:37 AM    AST 36 03/20/2025 09:18 AM    ALT 51 06/29/2025 10:37 AM    ALT 85 (H) 03/20/2025 09:18 AM    ALB 3.9 06/29/2025 10:37 AM    ALB 4 03/20/2025 09:18 AM    TP 6.4 06/29/2025 10:37 AM    TP 6.1 (L) 03/20/2025 09:18 AM    EGFR 7 07/07/2025 07:19 AM    EGFR 11 (L) 03/20/2025 09:18 AM    EGFR 27 (L) 01/04/2021 06:57 AM     Lab Results    Component Value Date/Time    HGB 10.0 (L) 07/01/2025 05:41 AM    WBC 8.85 07/01/2025 05:41 AM    WBC 6.80 03/06/2020 12:00 AM     07/01/2025 05:41 AM    INR 0.89 09/29/2024 08:26 AM    PTT 32 09/29/2024 08:26 AM     Lab Results   Component Value Date/Time    [id number] 6.661 (H) 06/18/2025 12:25 PM       Recent Imaging:  Procedure: XR chest 2 views  Result Date: 6/30/2025  Impression: No acute cardiopulmonary disease. Moderate hiatal hernia. Workstation performed: [id number]     Procedure: XR chest 1 view portable  Result Date: 6/18/2025  Impression: No acute cardiopulmonary disease. Workstation performed: [id number]     Procedure: US kidney and bladder with pvr  Result Date: 6/12/2025  Impression: No hydronephrosis. Patient was unable to void. Workstation performed: [id number]     Procedure: EGD  Result Date: 5/14/2025  Impression: 4 cm type I hiatal hernia Moderate edematous, erythematous mucosa in the antrum; performed cold forceps biopsy to rule out H. pylori 2 polyps in the body of the stomach were removed with cold snare; placed 2 clips successfully; hemostasis achieved The duodenal bulb, 1st part of the duodenum and 2nd part of the duodenum appeared normal. Previously placed clip was seen in the gastric antrum. RECOMMENDATION: Await pathology results Continue with PPI and Carafate. Follow biopsy results. Continue to avoid NSAIDs. Continue to follow nonulcerogenic/gastroparesis diet.   Osmin Mosqueda MD     Procedure: XR chest 1 view portable  Result Date: 3/22/2025  Impression: No acute cardiopulmonary disease. Workstation performed: [id number]       Administrative Statements     Over 30 minutes was spent dedicated towards goals of care discussion, prognostication, review of hospice services.   I have spent a total time of 68 minutes in caring for this patient on the day of the visit/encounter including Prognosis, Risks and benefits of tx options, Instructions for management, Patient and family  "education, Importance of tx compliance, Risk factor reductions, Impressions, Counseling / Coordination of care, Documenting in the medical record, Reviewing/placing orders in the medical record (including tests, medications, and/or procedures), Obtaining or reviewing history  , and Communicating with other healthcare professionals .   Topics discussed with the patient / family include symptom assessment and management, medication review, medication adjustment, psychosocial support, goals of care, hospice services, medical marijuana, supportive listening, and anticipatory guidance.       [1]   Past Medical History:  Diagnosis Date    Actinic keratosis     Acute blood loss anemia 09/24/2024    Acute cystitis without hematuria 04/28/2023    Acute cystitis without hematuria 04/28/2023    Acute gout due to renal impairment involving left foot 04/08/2025    Acute respiratory failure with hypoxia ([facility]) 02/15/2024    Acute-on-chronic kidney injury  ([facility])     NIKOS (acute kidney injury) ([facility]) 05/08/2020    Allergic     Allergic rhinitis     Ambulatory dysfunction 10/22/2020    AMS (altered mental status) 07/14/2020    Anesthesia     pt reports \"had to use double lumen endo tube for hiatal hernia repair/so surgeon could get to where he needed to work\"    Arthritis     Aspiration into airway     At high risk for falls 07/17/2024    Michael esophagus 1993    Lot of stress with children    Basal cell carcinoma 2007    left cheek     BCC (basal cell carcinoma) 05/27/2021    Left Nasal tip    Breast discharge 06/19/2023    Breast pain 06/19/2023    Cancer (HCC)     squamous cell cancer on forhead    Cancer (HCC)     basal cell on nose     Cholelithiasis     Chronic kidney disease 2000, 2018    Stones, kidney disease stage 4    Chronic pain disorder     bilat feet and joint pain on occas    Colon polyp     Confusion 10/30/2023    Constipation     COPD (chronic obstructive pulmonary disease) ([facility])     COVID-19 08/2021    recovered at " home/did receive monoclonal infusion    COVID-19 virus infection 08/16/2021    Dental bridge present     Depression     Diabetes mellitus (HCC)     Type 2    Diabetic neuropathy (HCC)     Difficulty walking 2017    Disease of thyroid gland     Dyspnea     Elevated liver enzymes 04/04/2023    Epigastric abdominal pain with severe diabetic gastroparesis, status post EGD/Botox injection, 5/14 05/17/2021    Facial abrasion, initial encounter 03/22/2023    Fall 03/22/2023    Family history of thyroid problem     Fatty liver     Fibromyalgia, primary     Fracture of orbital floor, blow-out, right, closed, with routine healing, subsequent encounter 03/22/2023    Fracture of orbital floor, right side, initial encounter for closed fracture (HCC) 03/22/2023    GERD (gastroesophageal reflux disease)     Gout     Heart burn     Hiatal hernia     History of cerebrovascular accident (CVA) due to ischemia 10/31/2023    10/23: MRI with foci of late acute infarct involving the right putamen       History of cholecystectomy 10/27/2023    History of colon polyps 07/30/2021    History of kidney stones 09/29/2020    Hx of recurrent kidney stones    History of kidney stones 09/29/2020    Hx of recurrent kidney stones  Formatting of this note might be different from the original. Hx of recurrent kidney stones  Last Assessment & Plan:  Formatting of this note might be different from the original. We will set her up for follow-up in 3 months with a KUB prior.  She knows to call if she has any kidney stone type pain in the meantime.    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 01/06/2023    Kidney problem     Kidney  stone     Left hip pain 10/05/2023    Leukocytosis 07/14/2020    Memory loss Julu2 2020    Motion sickness     Motion sickness     Multiple closed fractures of ribs of right side 03/22/2023    Nasal bones, closed fracture 03/22/2023    Neck pain     Obesity 1978    Obesity (BMI 30.0-34.9)     Olecranon bursitis of right elbow 07/29/2024    Other chest pain 09/13/2021    Other fatigue 09/21/2023    Palpitations 07/18/2023    Peripheral neuropathy     Pollen allergies     Postural dizziness with presyncope 08/16/2023    Preoperative clearance 06/27/2019    Reactive depression 07/17/2024    RLS (restless legs syndrome)     S/P foot surgery 07/20/2022    SCC (squamous cell carcinoma) 05/04/2021    left mid forehead    SCC (squamous cell carcinoma) 2023    chest    Seasonal allergies     Shingles 01 05 23    Sleep apnea     has inspire    Squamous cell skin cancer 2007    left cheek     Stroke (HCC)     Swollen ankles     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, left     great toe fusion    Transaminitis 06/03/2024    Urinary incontinence     Urinary tract infection 03/28/2022    Wears glasses    [2]   Past Surgical History:  Procedure Laterality Date    ABDOMINAL SURGERY  1997,2015,1972    Nissen x2 1972 tubal ligarion    ARTHROSCOPY KNEE Right     BREAST BIOPSY      stereotactic-benign    BREAST BIOPSY      stereo-benign    BREAST CYST EXCISION Bilateral     benign- done in [place]    BREAST EXCISIONAL BIOPSY      unknown date-benign    BREAST EXCISIONAL BIOPSY      unknown date-benign    BREAST EXCISIONAL BIOPSY      unknown date-benign    BREAST EXCISIONAL BIOPSY      unknown age-benign    CARDIAC CATHETERIZATION      CATARACT EXTRACTION Right     CHOLECYSTECTOMY      COLONOSCOPY      EXAMINATION UNDER ANESTHESIA N/A 06/24/2021    Procedure: EXAM UNDER ANESTHESIA (EUA), DISE;  Surgeon: Gregory Maier MD;  [facility]: [facility];  Service: ENT    HERNIA REPAIR      hiatal    HIATAL HERNIA REPAIR      KNEE  SURGERY Right     Torn maniscus lap surg    LIPOSUCTION      LYMPH NODE BIOPSY      MOHS SURGERY  05/20/2021    left mid forehead-Mickey    MOHS SURGERY Left 05/27/2021    Left nasal tip- mickey     VT LIGATION/BIOPSY TEMPORAL ARTERY Left 01/05/2023    Procedure: BIOPSY ARTERY TEMPORAL;  Surgeon: Alec Dennis DO;  [facility] Main [facility];  Service: Vascular    VT OPEN IMPLANTATION CRANIAL NERVE BOOM & PULSE GEN N/A 11/10/2021    Procedure: INSERTION UPPER AIRWAY STIMULATOR, INSPIRE IMPLANT;  Surgeon: Gregory Maier MD;  Location: [facility];  Service: ENT    VT UNLISTED PROCEDURE FOOT/TOES Right 07/19/2022    Procedure: 1st metatarsal phalangeal joint fusion;  Surgeon: Dede Bonner DPM;  [facility];  Service: Podiatry    REDUCTION MAMMAPLASTY Bilateral 2000    REDUCTION MAMMAPLASTY      SKIN BIOPSY      SKIN CANCER EXCISION  2007    squamous cell carcinoma     SKIN CANCER EXCISION  2007    basal cell carcinoma    SKIN CANCER EXCISION  2023    SCCIS chest    SQUAMOUS CELL CARCINOMA EXCISION      TOE SURGERY Right 08/04/2022    Right Great Toe Fusion    TONSILLECTOMY      TUBAL LIGATION      UPPER GASTROINTESTINAL ENDOSCOPY      US GUIDED BREAST BIOPSY RIGHT COMPLETE Right 07/18/2022   [3]   Family History  Problem Relation Name Age of Onset    Heart disease Mother Michelle Hopper     Depression Mother Michelle Hopper     Hypertension Mother Michelle Hopper     COPD Mother Michelle Hopper     Hearing loss Mother Michelle Hopper     Anxiety disorder Mother Michelle Hopper     Heart disease Father Jesus Hopper     Lung cancer Father Jesus Hopper 70        Smoker     Cancer Father Jesus Hopper         brain    Alcohol abuse Father Jesus Hopper     Dementia Father Jesus Hopper     Hypertension Father Jesus Hopper     Thyroid disease Father Jesus Hopper     COPD Father Jesus Hopper     Arthritis Father Jesus Hopper     Brain cancer Father Jesus Hopper 74    Brain cancer Sister Kiesha     Hypertension Sister Kiesha     Diabetes Sister Kiesha      Heart disease Sister Kiesha     Thyroid disease Sister Kiesha     Cancer Sister Kiesha         Lympoma, lung    Lung cancer Sister Kiesha 79    Anxiety disorder Sister Kiesha     Migraines Sister Kiesha     Hypertension Brother Mendoza Hopper     Diabetes Brother Mendoza Hopper     Cancer Brother Mendoza Hopper         Throat    Dementia Brother Mendoza Hall     Stroke Brother Jesus, michael, dhaval     Hypertension Brother Jesus, michael, dhaval     Heart disease Brother Mcallen     Diabetes Brother Mcallen     Hypertension Brother Mcallen     Allergies Brother dhaval     No Known Problems Son      No Known Problems Son      Brain cancer Paternal Aunt          unknown age    Breast cancer Neg Hx     [4]   Allergies  Allergen Reactions    Ciprofloxacin Hives    Pollen Extract Allergic Rhinitis    Sulfamethoxazole-Trimethoprim Tongue Swelling   [5]   Current Outpatient Medications on File Prior to Visit   Medication Sig Dispense Refill    acetaminophen (TYLENOL) 500 mg tablet Take 650 mg by mouth as needed      albuterol (Ventolin HFA) 90 mcg/act inhaler Inhale 2 puffs every 6 (six) hours as needed for wheezing 54 g 0    amLODIPine (NORVASC) 10 mg tablet Take 1 tablet (10 mg total) by mouth daily 30 tablet 0    B-D ULTRAFINE III SHORT PEN 31G X 8 MM MISC   3    calcium acetate (PHOSLO) capsule Take 1 capsule (667 mg total) by mouth daily after dinner 90 capsule 3    Cyanocobalamin (Vitamin B12) 1000 MCG TBCR Take 1 tablet (1,000 mcg total) by mouth once a week 12 tablet 0    desloratadine (CLARINEX) 5 MG tablet TAKE 1 TABLET (5 MG TOTAL) BY MOUTH DAILY. 90 tablet 1    docusate sodium (COLACE) 100 mg capsule Take 100 mg by mouth daily as needed for constipation      escitalopram (LEXAPRO) 10 mg tablet Take 1 tablet (10 mg total) by mouth daily 90 tablet 3    gabapentin (NEURONTIN) 100 mg capsule Take 100 mg by mouth if needed      hydrALAZINE (APRESOLINE) 25 mg tablet Take 3 tablets (75 mg total) by mouth every 8 (eight) hours 270  tablet 2    insulin degludec (Tresiba FlexTouch) 100 units/mL injection pen Inject 10 Units under the skin daily at bedtime      levothyroxine 125 mcg tablet Take 1 tablet (125 mcg total) by mouth daily 90 tablet 0    pantoprazole (PROTONIX) 40 mg tablet Take 1 tablet (40 mg total) by mouth 2 (two) times a day 60 tablet 5    pramipexole (MIRAPEX) 0.25 mg tablet Take 1 tablet (0.25 mg total) by mouth daily at bedtime 90 tablet 1    Probiotic Product (PROBIOTIC BLEND PO) Take by mouth in the morning and before bedtime.      psyllium (METAMUCIL) 58.6 % packet Take 1 packet by mouth if needed      rosuvastatin (CRESTOR) 5 mg tablet Take 1 tablet (5 mg total) by mouth every other day      sucralfate (CARAFATE) 1 g tablet Take 1 tablet (1 g total) by mouth 2 (two) times a day before meals      torsemide (DEMADEX) 10 mg tablet Take 1 tablet (10 mg total) by mouth daily as needed (lower extremity swelling) Take 10 mg. Daily except Monday, Wednesday, Friday 20 mg. 150 tablet 1    Vitamin D, Cholecalciferol, 25 MCG (1000 UT) TABS Take 2,000 Units by mouth in the morning       No current facility-administered medications on file prior to visit.   [6]   Social History  Tobacco Use    Smoking status: Former     Current packs/day: 0.00     Average packs/day: 2.0 packs/day for 10.0 years (20.0 ttl pk-yrs)     Types: Cigarettes     Start date: 1966     Quit date: 1976     Years since quittin.5     Passive exposure: Past    Smokeless tobacco: Never    Tobacco comments:     Smoked 2 pack a day   Vaping Use    Vaping status: Never Used   Substance and Sexual Activity    Alcohol use: Yes     Comment: 1 or 2 a year    Drug use: No    Sexual activity: Not Currently     Partners: Male     Birth control/protection: Abstinence, Post-menopausal, Female Sterilization     Comment: defer

## 2025-07-18 NOTE — PATIENT INSTRUCTIONS
It was a pleasure speaking with you today.    As discussed:  -Continue your medications as prescribed.  -Continue with a bowel regimen to avoid constipation.    Follow-up in: as needed pending Hospice Evaluation     Please do not hesitate to call me sooner should you have any questions/concerns or needs.    Medication safety issues - Do not drive under the influence of narcotics (including opioids), watch for adverse effects including confusion / altered mental status / respiratory depression (slowed breathing), keep medications stored in a safe/locked environment, do not use alcohol while opioids or other narcotics are in your system. Do not travel with more than the minimum number of tablets or capsules required for the trip.    ER Precautions  Watch for red flag symptoms including, but not limited to fevers, chills, chest pain, shortness of breath, intractable nausea/vomiting/diarrhea, or acute intractable pain (especially if pain is new or has changed).

## 2025-07-18 NOTE — ASSESSMENT & PLAN NOTE
Extensive GOC discussion held today.  Patient's goals are to remain home as possible and focus on comfort.  She does not wish to pursue Dialysis as she feels this would not be in line with her vision of quality of life. She states she has already spoken and toured dialysis centers with Cathleenfrida and has made the decision that dialysis is not part of her plan.     Reviewed Hospice services, philosophy, role of hospice physician, nurses, aide, DME, medications as well as different levels of care (RLOC at home vs at facility should she require assistance/nursing care that would beyond what can be achieved at home vs [facility] level of care - as discussed this would be for uncontrolled symptoms and approval of hospice medical direct at that point in time).     Summary of Plan:  1) Patient would like to speak with Hospice services directly to gain further information.  -freedom of choice was offered in which patient was open to hearing from [facility] service.  -referral placed today to  Hospice.     Patient's follow-up with me will depend on whether she decides to pursue hospice services. She was provided my card with my number so that she can update me on her decision based on her conversation with hospice services.     All questions and concerns answered today    Orders:    Ambulatory Referral to Hospice; Future

## 2025-08-12 ENCOUNTER — HOME CARE VISIT (OUTPATIENT)
Dept: HOME HOSPICE | Facility: HOSPICE | Age: 75
End: 2025-08-12
Payer: MEDICARE

## (undated) DEVICE — AMBU ASCOPE 4 RHINOLARYNGO SLIM SINGLE-USE, FLEXIBLE RHINOLARYNGOSCOPE STERILE FROM PACKAGE. INSERTION CORD DIAMETER 3.0 MM, LENGHT OF 300 MM. AND A 130-DEGREE BENDING ANGLE. MINIMUM PURCHASE 5 PCS = 1 BOX: Brand: ASCOPE™ 4 RHINOLARYNGO SLIM

## (undated) DEVICE — 2000CC GUARDIAN II: Brand: GUARDIAN

## (undated) DEVICE — GLOVE INDICATOR PI UNDERGLOVE SZ 7.5 BLUE

## (undated) DEVICE — Device

## (undated) DEVICE — ACE WRAP 4 IN UNSTERILE

## (undated) DEVICE — 3M™ STERI-STRIP™ REINFORCED ADHESIVE SKIN CLOSURES, R1546, 1/4 IN X 4 IN (6 MM X 100 MM), 10 STRIPS/ENVELOPE: Brand: 3M™ STERI-STRIP™

## (undated) DEVICE — BULB SYRINGE,IRRIGATION WITH PROTECTIVE CAP: Brand: DOVER

## (undated) DEVICE — 3M™ STERI-STRIP™ REINFORCED ADHESIVE SKIN CLOSURES, R1547, 1/2 IN X 4 IN (12 MM X 100 MM), 6 STRIPS/ENVELOPE: Brand: 3M™ STERI-STRIP™

## (undated) DEVICE — GLOVE SRG BIOGEL 7.5

## (undated) DEVICE — SUT VICRYL 4-0 PS-2 27 IN J426H

## (undated) DEVICE — ELECTRODE BLADE MOD E-Z CLEAN 2.5IN 6.4CM -0012M

## (undated) DEVICE — WET SKIN PREP TRAY: Brand: MEDLINE INDUSTRIES, INC.

## (undated) DEVICE — GAUZE SPONGES,16 PLY: Brand: CURITY

## (undated) DEVICE — ELECTRODE 8227304 5PK PRASS PR 18MM ROHS

## (undated) DEVICE — SKIN MARKER DUAL TIP WITH RULER CAP, FLEXIBLE RULER AND LABELS: Brand: DEVON

## (undated) DEVICE — CHLORAPREP HI-LITE 26ML ORANGE

## (undated) DEVICE — SCD SEQUENTIAL COMPRESSION COMFORT SLEEVE MEDIUM KNEE LENGTH: Brand: KENDALL SCD

## (undated) DEVICE — SUT ETHILON 4-0 PS-2 18 IN 1667H

## (undated) DEVICE — BETHLEHEM UNIVERSAL  MIONR EXT: Brand: CARDINAL HEALTH

## (undated) DEVICE — TIBURON SPLIT SHEET: Brand: CONVERTORS

## (undated) DEVICE — PLUMEPEN PRO 10FT

## (undated) DEVICE — PROBE 8225401 5PK SD-SD BIPOL STIM ROHS

## (undated) DEVICE — IV CATH INTROCAN 18G X 1 1/4 SAFETY

## (undated) DEVICE — SYRINGE 10ML LL

## (undated) DEVICE — 3M™ IOBAN™ 2 ANTIMICROBIAL INCISE DRAPE 6650EZ: Brand: IOBAN™ 2

## (undated) DEVICE — INTENDED FOR TISSUE SEPARATION, AND OTHER PROCEDURES THAT REQUIRE A SHARP SURGICAL BLADE TO PUNCTURE OR CUT.: Brand: BARD-PARKER SAFETY BLADES SIZE 15, STERILE

## (undated) DEVICE — PROVE COVER: Brand: UNBRANDED

## (undated) DEVICE — NEEDLE 25G X 1 1/2

## (undated) DEVICE — SUT VICRYL 3-0 SH 27 IN J416H

## (undated) DEVICE — GUIDEWIRE 0.045 IN TROCAR TIP

## (undated) DEVICE — NEEDLE 18 G X 1 1/2

## (undated) DEVICE — ALCOHOL PREP, 2 PLY, LARGE, MADE IN USA, SATURATED WITH 70% ISOPROPYL ALCOHOL, FOR EXTERNAL USE ONLY: Brand: WEBCOL

## (undated) DEVICE — GLOVE SRG BIOGEL ECLIPSE 7.5

## (undated) DEVICE — 10FR FRAZIER SUCTION HANDLE: Brand: CARDINAL HEALTH

## (undated) DEVICE — DRAPE SHEET THREE QUARTER

## (undated) DEVICE — 3M™ TEGADERM™ TRANSPARENT FILM DRESSING FRAME STYLE, 1624W, 2-3/8 IN X 2-3/4 IN (6 CM X 7 CM), 100/CT 4CT/CASE: Brand: 3M™ TEGADERM™

## (undated) DEVICE — BIPOLAR CORD DISP

## (undated) DEVICE — SUT MONOCRYL 4-0 PS-2 27 IN Y426H

## (undated) DEVICE — SUT MONOCRYL 5-0 P-3 18 IN Y493G

## (undated) DEVICE — OCCLUSIVE GAUZE STRIP,3% BISMUTH TRIBROMOPHENATE IN PETROLATUM BLEND: Brand: XEROFORM

## (undated) DEVICE — IMPLANTABLE DEVICE: Type: IMPLANTABLE DEVICE | Status: NON-FUNCTIONAL

## (undated) DEVICE — PACK UNIVERSAL NECK

## (undated) DEVICE — DRILL BIT 2.7MM CALIBRATED

## (undated) DEVICE — SUT VICRYL 3-0 PS-2 27 IN J427H

## (undated) DEVICE — CAST PADDING 4 IN SYNTHETIC NON-STRL

## (undated) DEVICE — ADHESIVE SKIN HIGH VISCOSITY EXOFIN 1ML

## (undated) DEVICE — BAG DECANTER

## (undated) DEVICE — GAUZE SPONGES,USP TYPE VII GAUZE, 12 PLY: Brand: CURITY

## (undated) DEVICE — VESSEL LOOPS X-RAY DETECTABLE: Brand: DEROYAL

## (undated) DEVICE — GLOVE SRG BIOGEL ORTHOPEDIC 7.5

## (undated) DEVICE — BB-TAK MTP THRD

## (undated) DEVICE — PENCIL ELECTROSURG E-Z CLEAN -0035H

## (undated) DEVICE — REMOTE SLEEP INSPIRE

## (undated) DEVICE — INTENDED FOR TISSUE SEPARATION, AND OTHER PROCEDURES THAT REQUIRE A SHARP SURGICAL BLADE TO PUNCTURE OR CUT.: Brand: BARD-PARKER ® CARBON RIB-BACK BLADES

## (undated) DEVICE — BIT DRILL 2MM

## (undated) DEVICE — VESSEL LOOP MAXI - RED

## (undated) DEVICE — 3M™ TEGADERM™ TRANSPARENT FILM DRESSING FRAME STYLE, 1626W, 4 IN X 4-3/4 IN (10 CM X 12 CM), 50/CT 4CT/CASE: Brand: 3M™ TEGADERM™

## (undated) DEVICE — TONGUE DEPRESSOR STERILE

## (undated) DEVICE — BETHLEHEM UNIVERSAL OUTPATIENT: Brand: CARDINAL HEALTH

## (undated) DEVICE — POV-IOD SWAB STICKS

## (undated) DEVICE — SCREW CORT 3 X 18MM LOPRFL: Type: IMPLANTABLE DEVICE | Status: NON-FUNCTIONAL

## (undated) DEVICE — SUT SILK 4-0 18 IN A183H

## (undated) DEVICE — CURITY NON-ADHERENT STRIPS: Brand: CURITY

## (undated) DEVICE — ELECTRODE BLADE MOD E-Z CLEAN  2.75IN 7CM -0012AM

## (undated) DEVICE — CATHETER 8591-38 PASSER,38CM

## (undated) DEVICE — SUT SILK 3-0 SH CR/8 18 IN C013D

## (undated) DEVICE — MASTISOL LIQ ADHESIVE 2/3ML

## (undated) DEVICE — SUT SILK 2-0 SH 30 IN K833H

## (undated) DEVICE — KERLIX BANDAGE ROLL: Brand: KERLIX

## (undated) DEVICE — STOCKINETTE REGULAR